# Patient Record
Sex: FEMALE | Race: WHITE | NOT HISPANIC OR LATINO | Employment: OTHER | URBAN - METROPOLITAN AREA
[De-identification: names, ages, dates, MRNs, and addresses within clinical notes are randomized per-mention and may not be internally consistent; named-entity substitution may affect disease eponyms.]

---

## 2017-10-02 PROBLEM — N85.8 UTERINE MASS: Status: ACTIVE | Noted: 2017-10-02

## 2018-12-11 PROBLEM — G40.909 SEIZURE DISORDER: Status: ACTIVE | Noted: 2018-12-11

## 2018-12-11 PROBLEM — I10 HTN (HYPERTENSION): Status: ACTIVE | Noted: 2018-12-11

## 2018-12-11 PROBLEM — S72.145A CLOSED NONDISPLACED INTERTROCHANTERIC FRACTURE OF LEFT FEMUR: Status: ACTIVE | Noted: 2018-12-11

## 2018-12-11 PROBLEM — S91.302A OPEN WOUND OF LEFT HEEL: Status: ACTIVE | Noted: 2018-12-11

## 2018-12-11 PROBLEM — G62.9 PERIPHERAL NEUROPATHY: Status: ACTIVE | Noted: 2018-12-11

## 2018-12-20 ENCOUNTER — TELEPHONE (OUTPATIENT)
Dept: ORTHOPEDICS | Facility: CLINIC | Age: 73
End: 2018-12-20

## 2018-12-20 NOTE — TELEPHONE ENCOUNTER
----- Message from Andria Todd sent at 12/20/2018 12:39 PM CST -----  Type:  Sooner Apoointment Request    Caller is requesting a sooner appointment.  Caller declined first available appointment listed below.  Caller will not accept being placed on the waitlist and is requesting a message be sent to doctor.    Name of Caller:  Patient  When is the first available appointment?  12/31/18  Symptoms:  2 week post op  Best Call Back Number:  [phone] (home)     Additional Information:  Advised to schedule an appt for 12/28/18, advised two weeks from previous surgery on 12/14/18

## 2018-12-20 NOTE — TELEPHONE ENCOUNTER
Called patient three times and left voicemail for patient to call back to come in and see Dr. De La Cruz. Will get patient in as soon as possible when they call back.

## 2018-12-27 ENCOUNTER — TELEPHONE (OUTPATIENT)
Dept: ORTHOPEDICS | Facility: CLINIC | Age: 73
End: 2018-12-27

## 2018-12-27 NOTE — TELEPHONE ENCOUNTER
Informed Roger with [facility] patient is partial weightbearing. Understanding verbalized      ----- Message from Jorgito Johnson sent at 12/27/2018 10:12 AM CST -----  Contact: Roger  Type: Needs Medical Advice    Who Called:  Roger with [facility]  Symptoms (please be specific):    How long has patient had these symptoms:    Pharmacy name and phone #:    Best Call Back Number: [phone]  Additional Information: requesting weight bearing status,requesting a call back

## 2018-12-28 ENCOUNTER — OFFICE VISIT (OUTPATIENT)
Dept: ORTHOPEDICS | Facility: CLINIC | Age: 73
End: 2018-12-28
Payer: MEDICARE

## 2018-12-28 VITALS — WEIGHT: 148 LBS | HEIGHT: 65 IN | BODY MASS INDEX: 24.66 KG/M2

## 2018-12-28 DIAGNOSIS — Z87.81 S/P ORIF (OPEN REDUCTION INTERNAL FIXATION) FRACTURE: Primary | ICD-10-CM

## 2018-12-28 DIAGNOSIS — Z98.890 S/P ORIF (OPEN REDUCTION INTERNAL FIXATION) FRACTURE: Primary | ICD-10-CM

## 2018-12-28 DIAGNOSIS — G89.18 POST-OP PAIN: ICD-10-CM

## 2018-12-28 PROCEDURE — 99999 PR PBB SHADOW E&M-EST. PATIENT-LVL III: CPT | Mod: PBBFAC,,, | Performed by: ORTHOPAEDIC SURGERY

## 2018-12-28 PROCEDURE — 99213 OFFICE O/P EST LOW 20 MIN: CPT | Mod: PBBFAC,PN | Performed by: ORTHOPAEDIC SURGERY

## 2018-12-28 PROCEDURE — 99024 POSTOP FOLLOW-UP VISIT: CPT | Mod: POP,,, | Performed by: ORTHOPAEDIC SURGERY

## 2018-12-28 RX ORDER — OXYCODONE AND ACETAMINOPHEN 10; 325 MG/1; MG/1
1 TABLET ORAL EVERY 6 HOURS PRN
Qty: 27 TABLET | Refills: 0 | Status: CANCELLED | OUTPATIENT
Start: 2018-12-28

## 2018-12-28 NOTE — PROGRESS NOTES
HISTORY OF PRESENT ILLNESS:  Two weeks out from TFN for a left intertrochanteric   femur fracture, doing well.  Wounds look good.  At this point, we will continue   with weightbearing to tolerance, although she cannot tolerate much   weightbearing as it is very painful for her.  Again, she takes opioid pain   medication on a chronic basis.  She is requesting a Percocet prescription.  We   will give this to her on one-time basis.  We can check her back here in a   month's time as a postoperative visit with x-rays of her left hip.      MARCIA/DAGMAR  dd: 12/28/2018 12:13:04 (CST)  td: 12/29/2018 00:45:27 (CST)  Doc ID   #[id number]  Job ID #[id number]    CC:

## 2018-12-31 ENCOUNTER — TELEPHONE (OUTPATIENT)
Dept: ORTHOPEDICS | Facility: CLINIC | Age: 73
End: 2018-12-31

## 2018-12-31 NOTE — TELEPHONE ENCOUNTER
----- Message from Fanny Franklin sent at 12/31/2018  3:17 PM CST -----  Type: Needs Medical Advice    Who Called:  Sabrina with [facility]  Best Call Back Number: [phone]  Additional Information: Stated patient fell on Saturday/scraped right arm/needs to know patient's weight bearing status/please call back to advise.

## 2018-12-31 NOTE — TELEPHONE ENCOUNTER
Spoke to Sabrina with [facility] home health. Advised patient is weight bearing as tolerated per Dr. De La Cruz's note from 12/28/18. Sabrina reports patient slid out of bed to floor but only scraped her arm.

## 2019-01-29 DIAGNOSIS — Z98.890 S/P ORIF (OPEN REDUCTION INTERNAL FIXATION) FRACTURE: Primary | ICD-10-CM

## 2019-01-29 DIAGNOSIS — Z87.81 S/P ORIF (OPEN REDUCTION INTERNAL FIXATION) FRACTURE: Primary | ICD-10-CM

## 2019-01-31 ENCOUNTER — HOSPITAL ENCOUNTER (OUTPATIENT)
Dept: RADIOLOGY | Facility: HOSPITAL | Age: 74
Discharge: HOME | End: 2019-01-31
Attending: ORTHOPAEDIC SURGERY
Payer: MEDICARE

## 2019-01-31 ENCOUNTER — OFFICE VISIT (OUTPATIENT)
Dept: ORTHOPEDICS | Facility: CLINIC | Age: 74
End: 2019-01-31
Payer: MEDICARE

## 2019-01-31 VITALS — BODY MASS INDEX: 24.66 KG/M2 | HEIGHT: 65 IN | WEIGHT: 148 LBS

## 2019-01-31 DIAGNOSIS — G89.18 POST-OP PAIN: ICD-10-CM

## 2019-01-31 DIAGNOSIS — Z87.81 S/P ORIF (OPEN REDUCTION INTERNAL FIXATION) FRACTURE: ICD-10-CM

## 2019-01-31 DIAGNOSIS — Z98.890 S/P ORIF (OPEN REDUCTION INTERNAL FIXATION) FRACTURE: Primary | ICD-10-CM

## 2019-01-31 DIAGNOSIS — Z87.81 S/P ORIF (OPEN REDUCTION INTERNAL FIXATION) FRACTURE: Primary | ICD-10-CM

## 2019-01-31 DIAGNOSIS — Z98.890 S/P ORIF (OPEN REDUCTION INTERNAL FIXATION) FRACTURE: ICD-10-CM

## 2019-01-31 PROCEDURE — 99999 PR PBB SHADOW E&M-EST. PATIENT-LVL III: CPT | Mod: PBBFAC,,, | Performed by: ORTHOPAEDIC SURGERY

## 2019-01-31 PROCEDURE — 99024 POSTOP FOLLOW-UP VISIT: CPT | Mod: POP,,, | Performed by: ORTHOPAEDIC SURGERY

## 2019-01-31 PROCEDURE — 99024 PR POST-OP FOLLOW-UP VISIT: ICD-10-PCS | Mod: POP,,, | Performed by: ORTHOPAEDIC SURGERY

## 2019-01-31 PROCEDURE — 73502 XR ORTHO PELVIS_HIP POST OP LEFT: ICD-10-PCS | Mod: 26,LT,, | Performed by: RADIOLOGY

## 2019-01-31 PROCEDURE — 99999 PR PBB SHADOW E&M-EST. PATIENT-LVL III: ICD-10-PCS | Mod: PBBFAC,,, | Performed by: ORTHOPAEDIC SURGERY

## 2019-01-31 PROCEDURE — 73502 X-RAY EXAM HIP UNI 2-3 VIEWS: CPT | Mod: 26,LT,, | Performed by: RADIOLOGY

## 2019-01-31 PROCEDURE — 99213 OFFICE O/P EST LOW 20 MIN: CPT | Mod: PBBFAC,25,PN | Performed by: ORTHOPAEDIC SURGERY

## 2019-01-31 PROCEDURE — 73502 X-RAY EXAM HIP UNI 2-3 VIEWS: CPT | Mod: TC,PO,LT

## 2019-01-31 NOTE — PROGRESS NOTES
Six weeks out from a trochanteric fixation nail, doing well.  Wounds look good.    No signs of infection.    X-rays look good.    PLAN:  Continue with activities to tolerance.  Follow up in six weeks' time as a   final postoperative visit.      MARCIA/DAGMAR  dd: 01/31/2019 16:12:49 (CST)  td: 02/01/2019 07:25:01 (CST)  Doc ID   #[id number]  Job ID #[id number]    CC:

## 2019-09-12 ENCOUNTER — HOSPITAL ENCOUNTER (EMERGENCY)
Facility: HOSPITAL | Age: 74
Discharge: HOME | End: 2019-09-12
Attending: EMERGENCY MEDICINE
Payer: MEDICARE

## 2019-09-12 VITALS
RESPIRATION RATE: 15 BRPM | TEMPERATURE: 98 F | OXYGEN SATURATION: 97 % | HEART RATE: 99 BPM | HEIGHT: 66 IN | DIASTOLIC BLOOD PRESSURE: 66 MMHG | SYSTOLIC BLOOD PRESSURE: 153 MMHG | BODY MASS INDEX: 28.28 KG/M2 | WEIGHT: 176 LBS

## 2019-09-12 DIAGNOSIS — J40 BRONCHITIS: ICD-10-CM

## 2019-09-12 DIAGNOSIS — R11.2 NON-INTRACTABLE VOMITING WITH NAUSEA, UNSPECIFIED VOMITING TYPE: Primary | ICD-10-CM

## 2019-09-12 DIAGNOSIS — R10.9 ABDOMINAL DISCOMFORT: ICD-10-CM

## 2019-09-12 LAB
ALBUMIN SERPL BCP-MCNC: 4 G/DL (ref 3.5–5.2)
ALP SERPL-CCNC: 84 U/L (ref 55–135)
ALT SERPL W/O P-5'-P-CCNC: 14 U/L (ref 10–44)
ANION GAP SERPL CALC-SCNC: 10 MMOL/L (ref 8–16)
AST SERPL-CCNC: 19 U/L (ref 10–40)
BASOPHILS # BLD AUTO: 0.03 K/UL (ref 0–0.2)
BASOPHILS NFR BLD: 0.6 % (ref 0–1.9)
BILIRUB SERPL-MCNC: 1.1 MG/DL (ref 0.1–1)
BILIRUB UR QL STRIP: NEGATIVE
BUN SERPL-MCNC: 23 MG/DL (ref 8–23)
CALCIUM SERPL-MCNC: 9.5 MG/DL (ref 8.7–10.5)
CHLORIDE SERPL-SCNC: 103 MMOL/L (ref 95–110)
CLARITY UR: CLEAR
CO2 SERPL-SCNC: 26 MMOL/L (ref 23–29)
COLOR UR: YELLOW
CREAT SERPL-MCNC: 0.7 MG/DL (ref 0.5–1.4)
DIFFERENTIAL METHOD: ABNORMAL
EOSINOPHIL # BLD AUTO: 0.1 K/UL (ref 0–0.5)
EOSINOPHIL NFR BLD: 1.2 % (ref 0–8)
ERYTHROCYTE [DISTWIDTH] IN BLOOD BY AUTOMATED COUNT: 12.8 % (ref 11.5–14.5)
EST. GFR  (AFRICAN AMERICAN): >60 ML/MIN/1.73 M^2
EST. GFR  (NON AFRICAN AMERICAN): >60 ML/MIN/1.73 M^2
GLUCOSE SERPL-MCNC: 103 MG/DL (ref 70–110)
GLUCOSE UR QL STRIP: NEGATIVE
HCT VFR BLD AUTO: 37.7 % (ref 37–48.5)
HGB BLD-MCNC: 12.2 G/DL (ref 12–16)
HGB UR QL STRIP: ABNORMAL
IMM GRANULOCYTES # BLD AUTO: 0.02 K/UL (ref 0–0.04)
IMM GRANULOCYTES NFR BLD AUTO: 0.4 % (ref 0–0.5)
KETONES UR QL STRIP: ABNORMAL
LEUKOCYTE ESTERASE UR QL STRIP: NEGATIVE
LIPASE SERPL-CCNC: 20 U/L (ref 4–60)
LYMPHOCYTES # BLD AUTO: 0.9 K/UL (ref 1–4.8)
LYMPHOCYTES NFR BLD: 18.8 % (ref 18–48)
MCH RBC QN AUTO: 29.8 PG (ref 27–31)
MCHC RBC AUTO-ENTMCNC: 32.4 G/DL (ref 32–36)
MCV RBC AUTO: 92 FL (ref 82–98)
MONOCYTES # BLD AUTO: 0.3 K/UL (ref 0.3–1)
MONOCYTES NFR BLD: 5.7 % (ref 4–15)
NEUTROPHILS # BLD AUTO: 3.6 K/UL (ref 1.8–7.7)
NEUTROPHILS NFR BLD: 73.3 % (ref 38–73)
NITRITE UR QL STRIP: NEGATIVE
NRBC BLD-RTO: 0 /100 WBC
PH UR STRIP: 6 [PH] (ref 5–8)
PLATELET # BLD AUTO: 215 K/UL (ref 150–350)
PMV BLD AUTO: 9.4 FL (ref 9.2–12.9)
POTASSIUM SERPL-SCNC: 4.3 MMOL/L (ref 3.5–5.1)
PROT SERPL-MCNC: 7.6 G/DL (ref 6–8.4)
PROT UR QL STRIP: ABNORMAL
RBC # BLD AUTO: 4.09 M/UL (ref 4–5.4)
SODIUM SERPL-SCNC: 139 MMOL/L (ref 136–145)
SP GR UR STRIP: 1.01 (ref 1–1.03)
TROPONIN I SERPL DL<=0.01 NG/ML-MCNC: <0.03 NG/ML (ref 0.02–0.04)
URN SPEC COLLECT METH UR: ABNORMAL
UROBILINOGEN UR STRIP-ACNC: NEGATIVE EU/DL
WBC # BLD AUTO: 4.9 K/UL (ref 3.9–12.7)

## 2019-09-12 PROCEDURE — 81003 URINALYSIS AUTO W/O SCOPE: CPT

## 2019-09-12 PROCEDURE — 63600175 PHARM REV CODE 636 W HCPCS: Performed by: EMERGENCY MEDICINE

## 2019-09-12 PROCEDURE — 99285 EMERGENCY DEPT VISIT HI MDM: CPT | Mod: 25

## 2019-09-12 PROCEDURE — 83690 ASSAY OF LIPASE: CPT

## 2019-09-12 PROCEDURE — 80053 COMPREHEN METABOLIC PANEL: CPT

## 2019-09-12 PROCEDURE — 51701 INSERT BLADDER CATHETER: CPT

## 2019-09-12 PROCEDURE — 25500020 PHARM REV CODE 255: Performed by: EMERGENCY MEDICINE

## 2019-09-12 PROCEDURE — 93005 ELECTROCARDIOGRAM TRACING: CPT

## 2019-09-12 PROCEDURE — 96374 THER/PROPH/DIAG INJ IV PUSH: CPT | Mod: XE

## 2019-09-12 PROCEDURE — 85025 COMPLETE CBC W/AUTO DIFF WBC: CPT

## 2019-09-12 PROCEDURE — 84484 ASSAY OF TROPONIN QUANT: CPT

## 2019-09-12 RX ORDER — ONDANSETRON 2 MG/ML
4 INJECTION INTRAMUSCULAR; INTRAVENOUS
Status: COMPLETED | OUTPATIENT
Start: 2019-09-12 | End: 2019-09-12

## 2019-09-12 RX ORDER — AZITHROMYCIN 250 MG/1
250 TABLET, FILM COATED ORAL DAILY
Qty: 6 TABLET | Refills: 0 | Status: SHIPPED | OUTPATIENT
Start: 2019-09-12 | End: 2020-01-07 | Stop reason: ALTCHOICE

## 2019-09-12 RX ORDER — FUROSEMIDE 40 MG/1
40 TABLET ORAL DAILY
Status: ON HOLD | COMMUNITY
End: 2020-01-11 | Stop reason: HOSPADM

## 2019-09-12 RX ORDER — OXYCODONE AND ACETAMINOPHEN 10; 325 MG/1; MG/1
1 TABLET ORAL 4 TIMES DAILY
COMMUNITY
End: 2020-08-22 | Stop reason: CLARIF

## 2019-09-12 RX ORDER — ONDANSETRON 4 MG/1
4 TABLET, ORALLY DISINTEGRATING ORAL ONCE
Qty: 10 TABLET | Refills: 0 | Status: SHIPPED | OUTPATIENT
Start: 2019-09-12 | End: 2019-09-12

## 2019-09-12 RX ORDER — CYCLOBENZAPRINE HCL 10 MG
10 TABLET ORAL 3 TIMES DAILY
COMMUNITY
End: 2022-05-18 | Stop reason: SDUPTHER

## 2019-09-12 RX ADMIN — ONDANSETRON 4 MG: 2 INJECTION INTRAMUSCULAR; INTRAVENOUS at 12:09

## 2019-09-12 RX ADMIN — IOHEXOL 100 ML: 350 INJECTION, SOLUTION INTRAVENOUS at 11:09

## 2019-09-12 NOTE — ED PROVIDER NOTES
Encounter Date: 9/12/2019       History     Chief Complaint   Patient presents with    Abdominal Pain     X 1 DAY    Nausea    Vomiting     Patient here with reported nausea vomiting cough productive of a yellow sputum upper abdominal pain she denies any fever chills  states patient has been sick for approximately 1 week the symptoms significantly worse this morning and she was pale he is concerned because she has a history of bleeding ulcer in the past does been no hematemesis cough is productive of yellow sputum and no hemoptysis there has. been no melena patient states that she has actually been constipated no diarrhea patient reports shortness of breath and chronic back pain        Review of patient's allergies indicates:  No Known Allergies  Past Medical History:   Diagnosis Date    Arthritis     Back pain     Bleeding ulcer 07/2016    Chronic pain     DDD (degenerative disc disease), cervical     DDD (degenerative disc disease), lumbar     Diverticulitis     Encounter for blood transfusion     Hypertension     Neuropathy of both feet     Seizures      Past Surgical History:   Procedure Laterality Date    BACK SURGERY      BREAST SURGERY Right     lumpectomy    CYSTOSCOPY N/A 10/2/2017    Performed by [facility]ales [facility] at [facility] OR    INSERTION, INTRAMEDULLARY SANTOS, FEMUR, TROCHANTER Left 12/11/2018    Performed by Eulalio De La Cruz MD at [facility] OR    INSERTION, SPINAL CORD STIMULATOR, MINIMALLY INVASIVE N/A 9/18/2013    Per[facility]el [facility] at [facility] OR    ROBOT ASSISTED LAPAROSCOPIC SALPINGO-OOPHERECTOMY Bilateral 10/2/2017    Performed by [facility] at [facility] OR    ROBOTIC ASSISTED LAPAROSCOPIC HYSTERECTOMY N/A 10/2/2017    Performed by Mely Morales MD at [facility] OR    SPINAL CORD STIMULATOR IMPLANT  09/18/2013    and removal    SPINE SURGERY  2006    lumbar L2-S1 decompression.    SPINE SURGERY      cervical decompression    TONSILLECTOMY       Family  History   Problem Relation Age of Onset    Diabetes Mother     Hypertension Mother     Diabetes Father         insulin dependent    Hypertension Father     Heart disease Father     Coronary artery disease Father     Diabetes Sister     Diabetes Brother     COPD Brother      Social History     Tobacco Use    Smoking status: Never Smoker    Smokeless tobacco: Never Used   Substance Use Topics    Alcohol use: No    Drug use: No     Review of Systems   Constitutional: Positive for appetite change and fatigue. Negative for chills and fever.   HENT: Negative.  Negative for congestion, ear pain and sinus pressure.    Eyes: Negative.    Respiratory: Positive for cough and shortness of breath. Negative for chest tightness.    Cardiovascular: Negative for chest pain, palpitations and leg swelling.   Gastrointestinal: Positive for abdominal pain, constipation, nausea and vomiting. Negative for blood in stool.   Endocrine: Negative.    Genitourinary: Negative for difficulty urinating, dysuria and hematuria.   Musculoskeletal: Negative.    Skin: Positive for pallor. Negative for rash.   Allergic/Immunologic: Negative for immunocompromised state.   Neurological: Negative for headaches.   Hematological: Negative for adenopathy.   Psychiatric/Behavioral: Negative.        Physical Exam     Initial Vitals [09/12/19 0956]   BP Pulse Resp Temp SpO2   (!) 157/80 99 18 97.7 °F (36.5 °C) 97 %      MAP       --         Physical Exam    Constitutional: She appears well-developed and well-nourished.   HENT:   Head: Normocephalic and atraumatic.   Right Ear: External ear normal.   Left Ear: External ear normal.   Mouth/Throat: Oropharynx is clear and moist.   Eyes: Conjunctivae and EOM are normal. Pupils are equal, round, and reactive to light. Right eye exhibits no discharge. Left eye exhibits no discharge.   Neck: Normal range of motion. Neck supple. No JVD present.   Cardiovascular: Normal rate, regular rhythm, normal heart  sounds and intact distal pulses.   Pulmonary/Chest: No respiratory distress. She has rhonchi.   Abdominal: Soft. Bowel sounds are normal.   Musculoskeletal: Normal range of motion. She exhibits no edema.   Neurological: She is alert and oriented to person, place, and time. She has normal strength. GCS score is 15. GCS eye subscore is 4. GCS verbal subscore is 5. GCS motor subscore is 6.   Skin: Skin is warm and dry. Capillary refill takes less than 2 seconds. There is pallor.   Psychiatric: She has a normal mood and affect. Her behavior is normal.         ED Course   Procedures  Labs Reviewed   CBC W/ AUTO DIFFERENTIAL - Abnormal; Notable for the following components:       Result Value    Lymph # 0.9 (*)     Gran% 73.3 (*)     All other components within normal limits   COMPREHENSIVE METABOLIC PANEL   LIPASE   URINALYSIS, REFLEX TO URINE CULTURE   TROPONIN I        ECG Results          EKG 12-lead (In process)  Result time 09/12/19 10:36:23    In process by Interface, Lab In [facility] (09/12/19 10:36:23)                 Narrative:    Test Reason : R10.9,    Vent. Rate : 098 BPM     Atrial Rate : 098 BPM     P-R Int : 142 ms          QRS Dur : 084 ms      QT Int : 354 ms       P-R-T Axes : 060 031 056 degrees     QTc Int : 451 ms    Normal sinus rhythm  Normal ECG  No previous ECGs available    Referred By: [email or web address]   SELF           Confirmed By:                             Imaging Results          X-Ray Chest AP Portable (In process)                                    ED Course as of Sep 12 1625   Thu Sep 12, 2019   1143 Lipase: 20 [AT]   1144 WBC: 4.90 [AT]   1144 Troponin I: <0.030 [AT]   1144 Hemoglobin: 12.2 [AT]   1144 Hematocrit: 37.7 [AT]   1144 Platelets: 215 [AT]   1144 Sodium: 139 [AT]   1144 Potassium: 4.3 [AT]   1144 Chloride: 103 [AT]   1144 CO2: 26 [AT]   1144 Glucose: 103 [AT]   1144 BUN, Bld: 23 [AT]   1144 Creatinine: 0.7 [AT]   1144 BILIRUBIN TOTAL(!): 1.1 [AT]   1144 Anion Gap: 10 [AT]   1144 ALT:  14 [AT]   1144 AST: 19 [AT]   1144 Alkaline Phosphatase: 84 [AT]      ED Course User Index  [AT] Bimal Fitch MD     Clinical Impression:       ICD-10-CM ICD-9-CM   1. Non-intractable vomiting with nausea, unspecified vomiting type R11.2 787.01   2. Abdominal discomfort R10.9 789.00   3. Bronchitis J40 490                                Bimal Fitch MD  09/12/19 3830

## 2019-10-04 ENCOUNTER — HOSPITAL ENCOUNTER (EMERGENCY)
Facility: HOSPITAL | Age: 74
Discharge: HOME | End: 2019-10-04
Attending: EMERGENCY MEDICINE
Payer: MEDICARE

## 2019-10-04 VITALS
DIASTOLIC BLOOD PRESSURE: 85 MMHG | HEART RATE: 81 BPM | SYSTOLIC BLOOD PRESSURE: 138 MMHG | HEIGHT: 66 IN | TEMPERATURE: 98 F | BODY MASS INDEX: 27.32 KG/M2 | RESPIRATION RATE: 16 BRPM | WEIGHT: 170 LBS | OXYGEN SATURATION: 99 %

## 2019-10-04 DIAGNOSIS — S13.9XXA NECK SPRAIN, INITIAL ENCOUNTER: Primary | ICD-10-CM

## 2019-10-04 DIAGNOSIS — S09.90XA INJURY OF HEAD, INITIAL ENCOUNTER: ICD-10-CM

## 2019-10-04 PROCEDURE — 25000003 PHARM REV CODE 250: Performed by: EMERGENCY MEDICINE

## 2019-10-04 PROCEDURE — 99284 EMERGENCY DEPT VISIT MOD MDM: CPT | Mod: 25

## 2019-10-04 RX ORDER — OXYCODONE AND ACETAMINOPHEN 5; 325 MG/1; MG/1
2 TABLET ORAL
Status: COMPLETED | OUTPATIENT
Start: 2019-10-04 | End: 2019-10-04

## 2019-10-04 RX ORDER — METHOCARBAMOL 500 MG/1
500 TABLET, FILM COATED ORAL
Status: COMPLETED | OUTPATIENT
Start: 2019-10-04 | End: 2019-10-04

## 2019-10-04 RX ORDER — METHOCARBAMOL 500 MG/1
1000 TABLET, FILM COATED ORAL 3 TIMES DAILY
Qty: 30 TABLET | Refills: 0 | Status: SHIPPED | OUTPATIENT
Start: 2019-10-04 | End: 2019-10-09

## 2019-10-04 RX ADMIN — METHOCARBAMOL 500 MG: 500 TABLET, FILM COATED ORAL at 01:10

## 2019-10-04 RX ADMIN — OXYCODONE AND ACETAMINOPHEN 2 TABLET: 5; 325 TABLET ORAL at 01:10
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
## 2020-01-07 ENCOUNTER — HOSPITAL ENCOUNTER (EMERGENCY)
Facility: HOSPITAL | Age: 75
Discharge: HOME | End: 2020-01-07
Attending: EMERGENCY MEDICINE
Payer: MEDICARE

## 2020-01-07 VITALS
SYSTOLIC BLOOD PRESSURE: 161 MMHG | OXYGEN SATURATION: 100 % | WEIGHT: 178 LBS | DIASTOLIC BLOOD PRESSURE: 73 MMHG | RESPIRATION RATE: 18 BRPM | BODY MASS INDEX: 28.61 KG/M2 | TEMPERATURE: 98 F | HEART RATE: 102 BPM | HEIGHT: 66 IN

## 2020-01-07 DIAGNOSIS — J40 BRONCHITIS: Primary | ICD-10-CM

## 2020-01-07 DIAGNOSIS — R05.9 COUGH: ICD-10-CM

## 2020-01-07 DIAGNOSIS — D64.9 ANEMIA, UNSPECIFIED TYPE: ICD-10-CM

## 2020-01-07 LAB
ALBUMIN SERPL BCP-MCNC: 3.8 G/DL (ref 3.5–5.2)
ALP SERPL-CCNC: 76 U/L (ref 55–135)
ALT SERPL W/O P-5'-P-CCNC: 15 U/L (ref 10–44)
ANION GAP SERPL CALC-SCNC: 15 MMOL/L (ref 8–16)
AST SERPL-CCNC: 18 U/L (ref 10–40)
BASOPHILS # BLD AUTO: 0.03 K/UL (ref 0–0.2)
BASOPHILS NFR BLD: 0.5 % (ref 0–1.9)
BILIRUB SERPL-MCNC: 1.1 MG/DL (ref 0.1–1)
BILIRUB UR QL STRIP: NEGATIVE
BUN SERPL-MCNC: 17 MG/DL (ref 8–23)
CALCIUM SERPL-MCNC: 9.5 MG/DL (ref 8.7–10.5)
CHLORIDE SERPL-SCNC: 103 MMOL/L (ref 95–110)
CLARITY UR: CLEAR
CO2 SERPL-SCNC: 20 MMOL/L (ref 23–29)
COLOR UR: YELLOW
CREAT SERPL-MCNC: 0.5 MG/DL (ref 0.5–1.4)
DEPRECATED S PYO AG THROAT QL EIA: NEGATIVE
DIFFERENTIAL METHOD: ABNORMAL
EOSINOPHIL # BLD AUTO: 0 K/UL (ref 0–0.5)
EOSINOPHIL NFR BLD: 0.3 % (ref 0–8)
ERYTHROCYTE [DISTWIDTH] IN BLOOD BY AUTOMATED COUNT: 12.8 % (ref 11.5–14.5)
EST. GFR  (AFRICAN AMERICAN): >60 ML/MIN/1.73 M^2
EST. GFR  (NON AFRICAN AMERICAN): >60 ML/MIN/1.73 M^2
GLUCOSE SERPL-MCNC: 105 MG/DL (ref 70–110)
GLUCOSE UR QL STRIP: NEGATIVE
HCT VFR BLD AUTO: 33.3 % (ref 37–48.5)
HGB BLD-MCNC: 10.7 G/DL (ref 12–16)
HGB UR QL STRIP: ABNORMAL
IMM GRANULOCYTES # BLD AUTO: 0.03 K/UL (ref 0–0.04)
IMM GRANULOCYTES NFR BLD AUTO: 0.5 % (ref 0–0.5)
INFLUENZA A, MOLECULAR: NEGATIVE
INFLUENZA B, MOLECULAR: NEGATIVE
KETONES UR QL STRIP: ABNORMAL
LEUKOCYTE ESTERASE UR QL STRIP: NEGATIVE
LYMPHOCYTES # BLD AUTO: 0.8 K/UL (ref 1–4.8)
LYMPHOCYTES NFR BLD: 12.5 % (ref 18–48)
MCH RBC QN AUTO: 28.9 PG (ref 27–31)
MCHC RBC AUTO-ENTMCNC: 32.1 G/DL (ref 32–36)
MCV RBC AUTO: 90 FL (ref 82–98)
MONOCYTES # BLD AUTO: 0.2 K/UL (ref 0.3–1)
MONOCYTES NFR BLD: 3.1 % (ref 4–15)
NEUTROPHILS # BLD AUTO: 5.1 K/UL (ref 1.8–7.7)
NEUTROPHILS NFR BLD: 83.1 % (ref 38–73)
NITRITE UR QL STRIP: NEGATIVE
NRBC BLD-RTO: 0 /100 WBC
PH UR STRIP: 6 [PH] (ref 5–8)
PLATELET # BLD AUTO: 274 K/UL (ref 150–350)
PMV BLD AUTO: 9.4 FL (ref 9.2–12.9)
POTASSIUM SERPL-SCNC: 3.8 MMOL/L (ref 3.5–5.1)
PROT SERPL-MCNC: 6.8 G/DL (ref 6–8.4)
PROT UR QL STRIP: ABNORMAL
RBC # BLD AUTO: 3.7 M/UL (ref 4–5.4)
SODIUM SERPL-SCNC: 138 MMOL/L (ref 136–145)
SP GR UR STRIP: 1.02 (ref 1–1.03)
SPECIMEN SOURCE: NORMAL
URN SPEC COLLECT METH UR: ABNORMAL
UROBILINOGEN UR STRIP-ACNC: NEGATIVE EU/DL
WBC # BLD AUTO: 6.17 K/UL (ref 3.9–12.7)

## 2020-01-07 PROCEDURE — 87880 STREP A ASSAY W/OPTIC: CPT

## 2020-01-07 PROCEDURE — 87502 INFLUENZA DNA AMP PROBE: CPT

## 2020-01-07 PROCEDURE — 85025 COMPLETE CBC W/AUTO DIFF WBC: CPT

## 2020-01-07 PROCEDURE — 81003 URINALYSIS AUTO W/O SCOPE: CPT

## 2020-01-07 PROCEDURE — 80053 COMPREHEN METABOLIC PANEL: CPT

## 2020-01-07 PROCEDURE — 87081 CULTURE SCREEN ONLY: CPT

## 2020-01-07 PROCEDURE — 99284 EMERGENCY DEPT VISIT MOD MDM: CPT | Mod: 25

## 2020-01-07 RX ORDER — LEVOFLOXACIN 500 MG/1
500 TABLET, FILM COATED ORAL DAILY
Qty: 7 TABLET | Refills: 0 | Status: SHIPPED | OUTPATIENT
Start: 2020-01-07 | End: 2020-01-08 | Stop reason: CLARIF

## 2020-01-07 NOTE — DISCHARGE INSTRUCTIONS
Follow-up with your primary care doctor for review of your laboratory tests to include year complete blood count.  You have a mild anemia and recommend repeat lab testing to check for stability

## 2020-01-07 NOTE — ED PROVIDER NOTES
Encounter Date: 1/7/2020       History     Chief Complaint   Patient presents with    Cough    Sore Throat     X 5 DAYS    Chills     74-year-old female who presents to the emergency department for evaluations of complaints of cough.  Patient and her family member present are both terrible historians.  To the best of my ability, I can some eyes that the patient has been coughing for a few days, productive of green sputum.  No reported or documented fevers however the patient does endorse chills as described as being cold by the patient and her family.    She denies any significant past medical history.  She denies hypertension, cardiovascular disease, diabetes.    Her primary care provider is Dr. Rangel and she is not current on her pneumococcal, meningeal or influenza vaccinations.        Review of patient's allergies indicates:  No Known Allergies  Past Medical History:   Diagnosis Date    Arthritis     Back pain     Bleeding ulcer 07/2016    Chronic pain     DDD (degenerative disc disease), cervical     DDD (degenerative disc disease), lumbar     Diverticulitis     Encounter for blood transfusion     Hypertension     Neuropathy of both feet     Seizures      Past Surgical History:   Procedure Laterality Date    BACK SURGERY      BREAST SURGERY Right     lumpectomy    INTRAMEDULLARY RODDING OF TROCHANTER OF FEMUR Left 12/11/2018    Procedure: INSERTION, INTRAMEDULLARY SANTOS, FEMUR, TROCHANTER;  Surgeon: Eulalio De La Cruz MD;  Location: [facility];  Service: Orthopedics;  Laterality: Left;    SPINAL CORD STIMULATOR IMPLANT  09/18/2013    and removal    SPINE SURGERY  2006    lumbar L2-S1 decompression.    SPINE SURGERY      cervical decompression    TONSILLECTOMY       Family History   Problem Relation Age of Onset    Diabetes Mother     Hypertension Mother     Diabetes Father         insulin dependent    Hypertension Father     Heart disease Father     Coronary artery disease Father      Diabetes Sister     Diabetes Brother     COPD Brother      Social History     Tobacco Use    Smoking status: Never Smoker    Smokeless tobacco: Never Used   Substance Use Topics    Alcohol use: No    Drug use: No     Review of Systems   Constitutional: Positive for chills. Negative for appetite change and fever.   HENT: Positive for sore throat. Negative for congestion and rhinorrhea.    Eyes: Negative for discharge and redness.   Respiratory: Positive for cough. Negative for shortness of breath.    Cardiovascular: Negative for chest pain.   Gastrointestinal: Negative for abdominal pain.   Genitourinary: Negative for difficulty urinating and dysuria.   Musculoskeletal: Negative for arthralgias, back pain and joint swelling.   Skin: Negative for rash and wound.   Neurological: Negative for weakness.   Psychiatric/Behavioral: The patient is not nervous/anxious.    All other systems reviewed and are negative.      Physical Exam     Initial Vitals [01/07/20 1008]   BP Pulse Resp Temp SpO2   (!) 188/88 94 18 98.1 °F (36.7 °C) 97 %      MAP       --         Physical Exam    Nursing note and vitals reviewed.  Constitutional: She appears well-developed and well-nourished.   HENT:   Head: Normocephalic and atraumatic.   Right Ear: Tympanic membrane is not erythematous. A middle ear effusion is present.   Left Ear: Tympanic membrane normal.   Nose: Nose normal.   Mouth/Throat: Oropharynx is clear and moist. No oropharyngeal exudate.   Eyes: EOM are normal. Pupils are equal, round, and reactive to light.   Neck: Normal range of motion.   Pulmonary/Chest: No respiratory distress. She exhibits no deformity.   Musculoskeletal: Normal range of motion.   Neurological: She is alert and oriented to person, place, and time.   Skin: Skin is warm and dry.   Psychiatric: She has a normal mood and affect. Her behavior is normal.         ED Course   Procedures  Labs Reviewed   CBC W/ AUTO DIFFERENTIAL - Abnormal; Notable for the  following components:       Result Value    RBC 3.70 (*)     Hemoglobin 10.7 (*)     Hematocrit 33.3 (*)     Lymph # 0.8 (*)     Mono # 0.2 (*)     Gran% 83.1 (*)     Lymph% 12.5 (*)     Mono% 3.1 (*)     All other components within normal limits   COMPREHENSIVE METABOLIC PANEL - Abnormal; Notable for the following components:    CO2 20 (*)     All other components within normal limits   URINALYSIS, REFLEX TO URINE CULTURE - Abnormal; Notable for the following components:    Protein, UA Trace (*)     Ketones, UA 3+ (*)     Occult Blood UA Trace (*)     All other components within normal limits    Narrative:     Specimen Source->Urine   THROAT SCREEN, RAPID   CULTURE, STREP A,  THROAT   INFLUENZA A AND B ANTIGEN    Narrative:     Specimen Source->Nasopharyngeal Swab          Imaging Results          X-Ray Chest PA And Lateral (Final result)  Result time 01/07/20 10:48:43    Final result by Ronan Neely MD (01/07/20 10:48:43)                 Impression:      No acute cardiopulmonary process.      Electronically signed by: Ronan Neely MD  Date:    01/07/2020  Time:    10:48             Narrative:    EXAMINATION:  XR CHEST PA AND LATERAL    CLINICAL HISTORY:  Cough    FINDINGS:  PA and lateral chest with comparison dated 09/12/2019.  Cardiomediastinal silhouette is within normal limits. A calcified granuloma is noted at the left lung base.  The lungs are otherwise clear.  Pulmonary vasculature is normal. No acute osseous abnormality.                                 Medical Decision Making:   History:   Old Medical Records: I decided to obtain old medical records.  Old Records Summarized: records from clinic visits.  Initial Assessment:   NAD  Differential Diagnosis:   The patient's differential diagnoses includes but is not limited to upper respiratory infection, viral illness, influenza, pneumonia  Clinical Tests:   Lab Tests: Ordered  Radiological Study: Ordered and Reviewed  ED Management:  74-year-old female  who presents to the emergency department for evaluation of complaints of cough, sore throat chills. Has a unremarkable workup.  Chest x-ray did not show any evidence of a infiltrate or consolidative process to suggest underlying pneumonia.  No leukocytosis with a normal WBC.  Of note, the patient did have a mild anemia with a H&H of 10 and 33.  A review of the patient's medical record, she had anemia last year after a hip fracture however this was felt to be secondary to surgical blood loss.  The patient does not appear to be symptomatic from her anemia today.  Will recommend she follow up with her primary care provider for repeat testing.  Rapid testing for influenza and strep were negative, urinalysis is also negative for infection.                       ED Course as of Jan 07 1157   Tue Jan 07, 2020   1051 Influenza A, Molecular: Negative [BF]   1051 Influenza B, Molecular: Negative [BF]   1051 No acute cardiopulmonary process.     X-Ray Chest PA And Lateral [BF]   1151 WBC: 6.17 [BF]   1152 Hemoglobin(!): 10.7 [BF]   1152 Hematocrit(!): 33.3 [BF]   1152 Platelets: 274 [BF]   1152 NITRITE UA: Negative [BF]   1152 Leukocytes, UA: Negative [BF]   1152 RAPID STREP A SCREEN: Negative [BF]   1156 Sodium: 138 [BF]   1156 Potassium: 3.8 [BF]   1156 Chloride: 103 [BF]   1156 CO2(!): 20 [BF]   1156 Glucose: 105 [BF]      ED Course User Index  [BF] ADIS Rai                Clinical Impression:       ICD-10-CM ICD-9-CM   1. Bronchitis J40 490   2. Cough R05 786.2   3. Anemia, unspecified type D64.9 285.9                             ADIS Rai  01/07/20 1205

## 2020-01-07 NOTE — ED NOTES
C/O cough and cold with chills.  Calm and cooperative.  Alert and ambualtory.  Family with pt. No RD. Airway patent.

## 2020-01-08 PROBLEM — Z71.89 ACP (ADVANCE CARE PLANNING): Status: ACTIVE | Noted: 2020-01-08

## 2020-01-08 PROBLEM — K52.9 COLITIS: Status: ACTIVE | Noted: 2020-01-08

## 2020-01-09 LAB — BACTERIA THROAT CULT: NORMAL

## 2020-01-10 PROBLEM — K59.03 DRUG-INDUCED CONSTIPATION: Status: ACTIVE | Noted: 2020-01-10

## 2020-03-02 ENCOUNTER — HOSPITAL ENCOUNTER (EMERGENCY)
Facility: HOSPITAL | Age: 75
Discharge: LEFT BEFORE DISCHARGE | End: 2020-03-02
Payer: MEDICARE

## 2020-03-02 VITALS
WEIGHT: 170 LBS | RESPIRATION RATE: 21 BRPM | DIASTOLIC BLOOD PRESSURE: 96 MMHG | OXYGEN SATURATION: 99 % | BODY MASS INDEX: 27.32 KG/M2 | HEART RATE: 107 BPM | HEIGHT: 66 IN | TEMPERATURE: 99 F | SYSTOLIC BLOOD PRESSURE: 170 MMHG

## 2020-03-02 DIAGNOSIS — R53.1 WEAKNESS: ICD-10-CM

## 2020-03-02 PROCEDURE — 99283 EMERGENCY DEPT VISIT LOW MDM: CPT

## 2020-07-17 ENCOUNTER — HOSPITAL ENCOUNTER (OUTPATIENT)
Dept: PREADMISSION TESTING | Facility: HOSPITAL | Age: 75
Discharge: HOME | End: 2020-07-17
Attending: INTERNAL MEDICINE
Payer: MEDICARE

## 2020-07-17 VITALS
BODY MASS INDEX: 28.93 KG/M2 | WEIGHT: 180 LBS | SYSTOLIC BLOOD PRESSURE: 97 MMHG | HEIGHT: 66 IN | HEART RATE: 74 BPM | RESPIRATION RATE: 18 BRPM | TEMPERATURE: 98 F | DIASTOLIC BLOOD PRESSURE: 66 MMHG | OXYGEN SATURATION: 97 %

## 2020-07-17 DIAGNOSIS — Z01.818 PRE-OP TESTING: ICD-10-CM

## 2020-07-17 LAB
ANION GAP SERPL CALC-SCNC: 8 MMOL/L (ref 8–16)
BASOPHILS # BLD AUTO: 0.02 K/UL (ref 0–0.2)
BASOPHILS NFR BLD: 0.5 % (ref 0–1.9)
BUN SERPL-MCNC: 20 MG/DL (ref 8–23)
CALCIUM SERPL-MCNC: 9.3 MG/DL (ref 8.7–10.5)
CHLORIDE SERPL-SCNC: 106 MMOL/L (ref 95–110)
CO2 SERPL-SCNC: 27 MMOL/L (ref 23–29)
CREAT SERPL-MCNC: 0.9 MG/DL (ref 0.5–1.4)
DIFFERENTIAL METHOD: ABNORMAL
EOSINOPHIL # BLD AUTO: 0.1 K/UL (ref 0–0.5)
EOSINOPHIL NFR BLD: 2.9 % (ref 0–8)
ERYTHROCYTE [DISTWIDTH] IN BLOOD BY AUTOMATED COUNT: 14.2 % (ref 11.5–14.5)
EST. GFR  (AFRICAN AMERICAN): >60 ML/MIN/1.73 M^2
EST. GFR  (NON AFRICAN AMERICAN): >60 ML/MIN/1.73 M^2
GLUCOSE SERPL-MCNC: 100 MG/DL (ref 70–110)
HCT VFR BLD AUTO: 32.4 % (ref 37–48.5)
HGB BLD-MCNC: 9.6 G/DL (ref 12–16)
IMM GRANULOCYTES # BLD AUTO: 0.01 K/UL (ref 0–0.04)
IMM GRANULOCYTES NFR BLD AUTO: 0.3 % (ref 0–0.5)
LYMPHOCYTES # BLD AUTO: 1.3 K/UL (ref 1–4.8)
LYMPHOCYTES NFR BLD: 32.5 % (ref 18–48)
MCH RBC QN AUTO: 26.7 PG (ref 27–31)
MCHC RBC AUTO-ENTMCNC: 29.6 G/DL (ref 32–36)
MCV RBC AUTO: 90 FL (ref 82–98)
MONOCYTES # BLD AUTO: 0.3 K/UL (ref 0.3–1)
MONOCYTES NFR BLD: 8.8 % (ref 4–15)
NEUTROPHILS # BLD AUTO: 2.1 K/UL (ref 1.8–7.7)
NEUTROPHILS NFR BLD: 55 % (ref 38–73)
NRBC BLD-RTO: 0 /100 WBC
PLATELET # BLD AUTO: 325 K/UL (ref 150–350)
PMV BLD AUTO: 9.7 FL (ref 9.2–12.9)
POTASSIUM SERPL-SCNC: 4.6 MMOL/L (ref 3.5–5.1)
RBC # BLD AUTO: 3.59 M/UL (ref 4–5.4)
SODIUM SERPL-SCNC: 141 MMOL/L (ref 136–145)
WBC # BLD AUTO: 3.85 K/UL (ref 3.9–12.7)

## 2020-07-17 PROCEDURE — 85025 COMPLETE CBC W/AUTO DIFF WBC: CPT

## 2020-07-17 PROCEDURE — 36415 COLL VENOUS BLD VENIPUNCTURE: CPT

## 2020-07-17 PROCEDURE — 80048 BASIC METABOLIC PNL TOTAL CA: CPT

## 2020-07-17 RX ORDER — ESCITALOPRAM OXALATE 20 MG/1
20 TABLET ORAL DAILY
COMMUNITY
End: 2021-03-26 | Stop reason: SDUPTHER

## 2020-07-17 RX ORDER — FUROSEMIDE 40 MG/1
40 TABLET ORAL DAILY PRN
COMMUNITY
End: 2022-06-30 | Stop reason: SDUPTHER

## 2020-07-17 NOTE — PLAN OF CARE
Review of Systems   HENT: Positive for hearing loss.    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   Endo/Heme/Allergies: Bruises/bleeds easily.

## 2020-07-17 NOTE — DISCHARGE INSTRUCTIONS
To confirm, Your doctor has instructed you that procedure is scheduled for: July 21  COVID test at [address] [facility] @ 2:40pm   Endoscopy nurse will call the afternoon prior to surgery between 4:00 and 6:00 PM with the final arrival time.      Enter at [address] and come through front entrance.  You will be screened/ have temperature checked.    You may have 1 visitor who will also be screened.     Check in at registration.   After registration, proceed past gift shop and through glass door ( [facility] [place]) Check in at the nurses station to the left.   Do not eat or drink anything after midnight the night before your surgery - THIS INCLUDES  WATER, GUM, MINTS AND CANDY.  YOU MAY BRUSH YOUR TEETH BUT DO NOT SWALLOW     TAKE ONLY THESE MEDICATIONS WITH A SMALL SIP OF WATER THE MORNING OF YOUR PROCEDURE:  NONE      ONLY if you are diabetic, check your sugar in the morning before your procedure.       Do not take any diabetic medicines or insulin the morning of surgery .     PLEASE NOTE:  The surgery schedule has many variables which may affect the time of your surgery case.  Family members should be available if your surgery time changes.  Plan to be here the day of your procedure between 4-6 hours.      DO NOT TAKE THESE MEDICATIONS 5-7 DAYS PRIOR to your procedure or per your surgeon's request: ASPIRIN, ALEVE, ADVIL, IBUPROFEN,  MIRNA SELTZER, BC , FISH OIL , VITAMIN E, HERBALS  (May take Tylenol)      ONLY if you are prescribed any types of blood thinners such as:  Aspirin, Coumadin, Plavix, Pradaxa, Xarelto, Aggrenox, Effient, Eliquis, Savasya, Brilinta, or any other, ask your surgeon whether you should stop taking them and how long before surgery you should stop.  You may also need to verify with the prescribing physician if it is ok to stop your medication.                                                        IMPORTANT INSTRUCTIONS      · Do not smoke, vape or drink alcoholic beverages 24 hours  prior to your procedure.  · Shower the night before  and sleep in a bed with clean sheets.  Do not sleep with a pet in the bed.     · If you wear contact lenses, dentures, hearing aids or glasses, bring a container to put them in during surgery and give to a family member for safe keeping.    · Please leave all jewelry, piercing's and valuables at home.   · Wear rubber soled shoes (no flip flops).  · ONLY for patients requiring bowel prep, written instructions will be given by your doctor's office.  · If your doctor has scheduled you for an overnight stay, bring a small overnight bag with any personal items you need.    · Make arrangements in advance for transportation home by a responsible adult.      · You must make arrangements for transportation, TAXI'S, UBER'S OR LYFTS ARE NOT ALLOWED.        If you have any questions about these instructions, call (Monday - Friday)  Endoscopy [phone]

## 2020-07-18 ENCOUNTER — LAB VISIT (OUTPATIENT)
Dept: PRIMARY CARE CLINIC | Facility: CLINIC | Age: 75
End: 2020-07-18
Payer: MEDICARE

## 2020-07-18 DIAGNOSIS — Z01.818 PREOP TESTING: ICD-10-CM

## 2020-07-18 PROCEDURE — U0003 INFECTIOUS AGENT DETECTION BY NUCLEIC ACID (DNA OR RNA); SEVERE ACUTE RESPIRATORY SYNDROME CORONAVIRUS 2 (SARS-COV-2) (CORONAVIRUS DISEASE [COVID-19]), AMPLIFIED PROBE TECHNIQUE, MAKING USE OF HIGH THROUGHPUT TECHNOLOGIES AS DESCRIBED BY CMS-2020-01-R: HCPCS

## 2020-07-19 LAB — SARS-COV-2 RNA RESP QL NAA+PROBE: NOT DETECTED

## 2020-07-20 ENCOUNTER — ANESTHESIA EVENT (OUTPATIENT)
Dept: SURGERY | Facility: HOSPITAL | Age: 75
End: 2020-07-20
Payer: MEDICARE

## 2020-07-20 RX ORDER — HYDROCODONE BITARTRATE AND ACETAMINOPHEN 10; 325 MG/1; MG/1
1 TABLET ORAL 4 TIMES DAILY
COMMUNITY
End: 2021-10-20

## 2020-07-21 ENCOUNTER — ANESTHESIA (OUTPATIENT)
Dept: SURGERY | Facility: HOSPITAL | Age: 75
End: 2020-07-21
Payer: MEDICARE

## 2020-07-21 ENCOUNTER — HOSPITAL ENCOUNTER (OUTPATIENT)
Facility: HOSPITAL | Age: 75
Discharge: HOME | End: 2020-07-21
Attending: INTERNAL MEDICINE | Admitting: INTERNAL MEDICINE
Payer: MEDICARE

## 2020-07-21 VITALS
TEMPERATURE: 98 F | RESPIRATION RATE: 18 BRPM | OXYGEN SATURATION: 99 % | SYSTOLIC BLOOD PRESSURE: 146 MMHG | HEART RATE: 69 BPM | DIASTOLIC BLOOD PRESSURE: 75 MMHG

## 2020-07-21 DIAGNOSIS — K83.9 DISEASE OF BILIARY TRACT: ICD-10-CM

## 2020-07-21 PROCEDURE — 00731 ANES UPR GI NDSC PX NOS: CPT | Performed by: INTERNAL MEDICINE

## 2020-07-21 PROCEDURE — 37000009 HC ANESTHESIA EA ADD 15 MINS: Performed by: INTERNAL MEDICINE

## 2020-07-21 PROCEDURE — 25000003 PHARM REV CODE 250: Performed by: NURSE ANESTHETIST, CERTIFIED REGISTERED

## 2020-07-21 PROCEDURE — 43237 ENDOSCOPIC US EXAM ESOPH: CPT | Performed by: INTERNAL MEDICINE

## 2020-07-21 PROCEDURE — 37000008 HC ANESTHESIA 1ST 15 MINUTES: Performed by: INTERNAL MEDICINE

## 2020-07-21 PROCEDURE — 63600175 PHARM REV CODE 636 W HCPCS: Performed by: NURSE ANESTHETIST, CERTIFIED REGISTERED

## 2020-07-21 RX ORDER — PROPOFOL 10 MG/ML
VIAL (ML) INTRAVENOUS
Status: DISCONTINUED | OUTPATIENT
Start: 2020-07-21 | End: 2020-07-21

## 2020-07-21 RX ORDER — SODIUM CHLORIDE 9 MG/ML
INJECTION, SOLUTION INTRAVENOUS CONTINUOUS PRN
Status: DISCONTINUED | OUTPATIENT
Start: 2020-07-21 | End: 2020-07-21

## 2020-07-21 RX ADMIN — PROPOFOL 25 MG: 10 INJECTION, EMULSION INTRAVENOUS at 10:07

## 2020-07-21 RX ADMIN — PROPOFOL 20 MG: 10 INJECTION, EMULSION INTRAVENOUS at 10:07

## 2020-07-21 RX ADMIN — PROPOFOL 10 MG: 10 INJECTION, EMULSION INTRAVENOUS at 10:07

## 2020-07-21 RX ADMIN — SODIUM CHLORIDE: 9 INJECTION, SOLUTION INTRAVENOUS at 10:07

## 2020-07-21 NOTE — ANESTHESIA POSTPROCEDURE EVALUATION
Anesthesia Post Evaluation    Patient: Froilan Ray    Procedure(s) Performed: Procedure(s) (LRB):  ULTRASOUND, UPPER GI TRACT, ENDOSCOPIC (N/A)    Final Anesthesia Type: MAC    Patient location: endoscopy.  Patient participation: Yes- Able to Participate  Level of consciousness: awake and alert  Post-procedure vital signs: reviewed and stable  Pain management: adequate  Airway patency: patent    PONV status at discharge: No PONV  Anesthetic complications: no      Cardiovascular status: blood pressure returned to baseline and stable  Respiratory status: unassisted and room air  Hydration status: euvolemic  Follow-up not needed.          Vitals Value Taken Time   /75 07/21/20 1120   Temp 36.7 °C (98 °F) 07/21/20 1120   Pulse 69 07/21/20 1120   Resp 18 07/21/20 1120   SpO2 99 % 07/21/20 1120         No case tracking events are documented in the log.      Pain/Duarte Score: Duarte Score: 10 (7/21/2020 11:20 AM)

## 2020-07-21 NOTE — H&P
GASTROENTEROLOGY PRE-PROCEDURE H&P NOTE  Patient Name: Froilan Ray  Patient MRN: [id number]  Patient : 1945    Service date: 2020    PCP: Quinn Rangel MD    No chief complaint on file.      HPI: Patient is a 74 y.o. female with PMHx as below here for evaluation of biliary dilation on CT    Past Medical History:  Past Medical History:   Diagnosis Date    Arthritis     Back pain     Bleeding ulcer 2016    Chronic pain     DDD (degenerative disc disease), cervical     DDD (degenerative disc disease), lumbar     Diverticulitis     Encounter for blood transfusion     Hypertension     IBS (irritable bowel syndrome)     Neuropathy of both feet     Seizures     none  since     Umbilical hernia         Past Surgical History:  Past Surgical History:   Procedure Laterality Date    BACK SURGERY      BREAST SURGERY Right     lumpectomy    EYE SURGERY      cataract    HYSTERECTOMY      INTRAMEDULLARY RODDING OF TROCHANTER OF FEMUR Left 2018    Procedure: INSERTION, INTRAMEDULLARY SANTOS, FEMUR, TROCHANTER;  Surgeon: Eulalio De La Cruz MD;  Location: [facility] OR;  Service: Orthopedics;  Laterality: Left;    SPINAL CORD STIMULATOR IMPLANT  2013    and removal    SPINE SURGERY      lumbar L2-S1 decompression.    SPINE SURGERY      cervical decompression    TONSILLECTOMY          Home Medications:  Medications Prior to Admission   Medication Sig Dispense Refill Last Dose    amlodipine-benazepril (LOTREL) 5-20 mg Cap Take 1 capsule by mouth once daily.    2020 at Unknown time    cyclobenzaprine (FLEXERIL) 10 MG tablet Take 10 mg by mouth 3 (three) times daily.   2020 at Unknown time    escitalopram oxalate (LEXAPRO) 20 MG tablet Take 20 mg by mouth once daily.   2020 at Unknown time    furosemide (LASIX) 20 MG tablet Take 20 mg by mouth once daily.   2020 at Unknown time    HYDROcodone-acetaminophen (NORCO)  mg per tablet Take 1 tablet by  mouth every 4 (four) hours as needed for Pain.   7/21/2020 at 0400    pregabalin (LYRICA) 150 MG capsule Take 150 mg by mouth 3 (three) times daily.   7/20/2020 at Unknown time    oxyCODONE-acetaminophen (PERCOCET)  mg per tablet Take 1 tablet by mouth 4 (four) times daily.          Inpatient Medications:        Review of patient's allergies indicates:  No Known Allergies    Social History:   Social History     Occupational History    Not on file   Tobacco Use    Smoking status: Never Smoker    Smokeless tobacco: Never Used   Substance and Sexual Activity    Alcohol use: No    Drug use: No    Sexual activity: Not on file       Family History:   Family History   Problem Relation Age of Onset    Diabetes Mother     Hypertension Mother     Diabetes Father         insulin dependent    Hypertension Father     Heart disease Father     Coronary artery disease Father     Diabetes Sister     Diabetes Brother     COPD Brother        Review of Systems:  A 10 point review of systems was performed and was normal, except as mentioned in the HPI, including constitutional, HEENT, heme, lymph, cardiovascular, respiratory, gastrointestinal, genitourinary, neurologic, endocrine, psychiatric and musculoskeletal.      OBJECTIVE:    Physical Exam:  24 Hour Vital Sign Ranges: Temp:  [98.5 °F (36.9 °C)] 98.5 °F (36.9 °C)  Pulse:  [78] 78  Resp:  [17] 17  SpO2:  [95 %] 95 %  BP: (133)/(63) 133/63  Most recent vitals: /63 (BP Location: Left arm, Patient Position: Lying)   Pulse 78   Temp 98.5 °F (36.9 °C) (Oral)   Resp 17   SpO2 95%   Breastfeeding No    GEN: well-developed, well-nourished, awake and alert, non-toxic appearing adult  HEENT: PERRL, sclera anicteric, oral mucosa pink and moist without lesion  NECK: trachea midline; Good ROM  CV: regular rate and rhythm, no murmurs or gallops  RESP: clear to auscultation bilaterally, no wheezes, rhonci or rales  ABD: soft, non-tender, non-distended, normal  bowel sounds  EXT: no swelling or edema, 2+ pulses distally  SKIN: no rashes or jaundice  PSYCH: normal affect    Labs:   No results for input(s): WBC, MCV, PLT in the last 72 hours.    Invalid input(s): HGBAU  No results for input(s): NA, K, CL, CO2, BUN, GLU in the last 72 hours.    Invalid input(s): CREA  No results for input(s): ALB in the last 72 hours.    Invalid input(s): ALKP, SGOT, SGPT, TBIL, DBIL, TPRO  No results for input(s): PT, INR, PTT in the last 72 hours.      IMPRESSION / RECOMMENDATIONS:  EUS. Risks, benefits, alternative discussed in detail with patient / family regarding anticipated procedure and possible complications.     Marcio Nguyễn III  7/21/2020  10:14 AM

## 2020-07-21 NOTE — [employer] PATIENT INSTRUCTIONS
Discharge Summary/Instructions after an Endoscopic Procedure  Patient Name: Froilan Ray  Patient MRN: [id number]  Patient YOB: 1945  Tuesday, July 21, 2020  Marcio Nguyễn III, MD  RESTRICTIONS:  During your procedure today, you received medications for sedation.  These   medications may affect your judgment, balance and coordination.  Therefore,   for 24 hours, you have the following restrictions:   - DO NOT drive a car, operate machinery, make legal/financial decisions,   sign important papers or drink alcohol.    ACTIVITY:  Today: no heavy lifting, straining or running due to procedural   sedation/anesthesia.  The following day: return to full activity including work.  DIET:  Eat and drink normally unless instructed otherwise.     TREATMENT FOR COMMON SIDE EFFECTS:  - Mild abdominal pain, nausea, belching, bloating or excessive gas:  rest,   eat lightly and use a heating pad.  - Sore Throat: treat with throat lozenges and/or gargle with warm salt   water.  - Because air was used during the procedure, expelling large amounts of air   from your rectum or belching is normal.  - If a bowel prep was taken, you may not have a bowel movement for 1-3 days.    This is normal.  SYMPTOMS TO WATCH FOR AND REPORT TO YOUR PHYSICIAN:  1. Abdominal pain or bloating, other than gas cramps.  2. Chest pain.  3. Back pain.  4. Signs of infection such as: chills or fever occurring within 24 hours   after the procedure.  5. Rectal bleeding, which would show as bright red, maroon, or black stools.   (A tablespoon of blood from the rectum is not serious, especially if   hemorrhoids are present.)  6. Vomiting.  7. Weakness or dizziness.  GO DIRECTLY TO THE NEAREST EMERGENCY ROOM IF YOU HAVE ANY OF THE FOLLOWING:      Difficulty breathing              Chills and/or fever over 101 F   Persistent vomiting and/or vomiting blood   Severe abdominal pain   Severe chest pain   Black, tarry stools   Bleeding- more than one  tablespoon   Any other symptom or condition that you feel may need urgent attention  Your doctor recommends these additional instructions:  If any biopsies were taken, your doctors clinic will contact you in 1 to 2   weeks with any results.  - Discharge patient to home.   - Patient has a contact number available for emergencies.  The signs and   symptoms of potential delayed complications were discussed with the   patient.  Return to normal activities tomorrow.  Written discharge   instructions were provided to the patient.   - Resume regular diet.   - Continue present medications.  For questions, problems or results please call your physician - Marcio Nguyễn III, MD at Work:  [phone].  [facility], EMERGENCY ROOM PHONE NUMBER: [phone]  IF A COMPLICATION OR EMERGENCY SITUATION ARISES AND YOU ARE UNABLE TO REACH   YOUR PHYSICIAN - GO DIRECTLY TO THE EMERGENCY ROOM.  Marcio Nguyễn III, MD  7/21/2020 10:57:50 AM  This report has been verified and signed electronically.  [employer]

## 2020-07-21 NOTE — ANESTHESIA PREPROCEDURE EVALUATION
07/21/2020  Froilan Ray is a 74 y.o., female.    Patient Active Problem List   Diagnosis    Chronic pain    Encounter for postoperative wound check    Uterine mass    Closed nondisplaced intertrochanteric fracture of left femur    HTN (hypertension)    Peripheral neuropathy    Seizure disorder    Open wound of left heel    Colitis    ACP (advance care planning)    Drug-induced constipation    Disease of biliary tract       Past Surgical History:   Procedure Laterality Date    BACK SURGERY      BREAST SURGERY Right     lumpectomy    EYE SURGERY      cataract    HYSTERECTOMY      INTRAMEDULLARY RODDING OF TROCHANTER OF FEMUR Left 12/11/2018    Procedure: INSERTION, INTRAMEDULLARY SANTOS, FEMUR, TROCHANTER;  Surgeon: Eulalio De La Cruz MD;  Location: [facility];  Service: Orthopedics;  Laterality: Left;    SPINAL CORD STIMULATOR IMPLANT  09/18/2013    and removal    SPINE SURGERY  2006    lumbar L2-S1 decompression.    SPINE SURGERY      cervical decompression    TONSILLECTOMY          Tobacco Use:  The patient  reports that she has never smoked. She has never used smokeless tobacco.     Results for orders placed or performed during the hospital encounter of 03/02/20   EKG 12-lead    Collection Time: 03/02/20  9:03 AM    Narrative    Test Reason : R53.1,    Vent. Rate : 092 BPM     Atrial Rate : 092 BPM     P-R Int : 152 ms          QRS Dur : 084 ms      QT Int : 360 ms       P-R-T Axes : 068 016 068 degrees     QTc Int : 445 ms    Normal sinus rhythm  Normal ECG  When compared with ECG of 12-SEP-2019 10:05,  No significant change was found  Confirmed by Rivera REID, Levi ([id number]) on 3/3/2020 10:36:16 AM    Referred By: [email or web address]   SELF           Confirmed By:Levi Stafford MD             Lab Results   Component Value Date    WBC 3.85 (L) 07/17/2020    HGB 9.6 (L) 07/17/2020    HCT 32.4 (L)  07/17/2020    MCV 90 07/17/2020     07/17/2020     BMP  Lab Results   Component Value Date     07/17/2020    K 4.6 07/17/2020     07/17/2020    CO2 27 07/17/2020    BUN 20 07/17/2020    CREATININE 0.9 07/17/2020    CALCIUM 9.3 07/17/2020    ANIONGAP 8 07/17/2020     07/17/2020     (H) 03/03/2020     (H) 03/02/2020       No results found for this or any previous visit.        Anesthesia Evaluation          Review of Systems  EENT/Dental:   Eyes: (right eye blindness) Ears General/Symptom(s) (decrease hearing )    Cardiovascular:   Hypertension    Hepatic/GI:   PUD, (in 2016)  Hepatic/GI Symptoms: (history of colitis in 2017)    Musculoskeletal:   Arthritis (cervical and lumbar DDD)   Musculoskeletal General/Symptoms: (reports limited neck movement, she is s/p neck fusion)    Neurological:   Neuromuscular Disease, (bilateral feet neuropathy) Seizures (last seizure was 3 years ago, and she is no longer on depakote.), well controlled    Easy bruising, currently some visible in her arms.      Physical Exam  General:  Well nourished    Airway/Jaw/Neck:  Airway Findings: Mouth Opening: Normal Tongue: Normal  General Airway Assessment: Adult  Mallampati: II  TM Distance: Normal, at least 6 cm  Jaw/Neck Findings:  Neck ROM: Normal ROM     Eyes/Ears/Nose:  Eyes/Ears/Nose Findings:    Dental:  Dental Findings: (reports chipped tooth) In tact   Chest/Lungs:  Chest/Lungs Findings: Clear to auscultation, Normal Respiratory Rate     Heart/Vascular:  Heart Findings: Rate: Normal  Rhythm: Regular Rhythm  Sounds: Normal     Abdomen:  Abdomen Findings:     Musculoskeletal:  Musculoskeletal Findings:    Skin:  Skin Findings:     Mental Status:  Mental Status Findings:  Cooperative, Alert and Oriented         Anesthesia Plan  Type of Anesthesia, risks & benefits discussed:  Anesthesia Type:  MAC  Patient's Preference: MAC  Intra-op Monitoring Plan: standard ASA monitors  Intra-op Monitoring Plan  Comments:   Post Op Pain Control Plan: IV/PO Opioids PRN  Post Op Pain Control Plan Comments:   Induction:   IV  Beta Blocker:         Informed Consent: Patient understands risks and agrees with Anesthesia plan.  Questions answered. Anesthesia consent signed with patient.  ASA Score: 3     Day of Surgery Review of History & Physical:    H&P update referred to the provider.     Anesthesia Plan Notes: MAC with propofol.        Ready For Surgery From Anesthesia Perspective.

## 2020-07-21 NOTE — TRANSFER OF CARE
Anesthesia Transfer of Care Note    Patient: Froilan Ray    Procedure(s) Performed: Procedure(s) (LRB):  ULTRASOUND, UPPER GI TRACT, ENDOSCOPIC (N/A)    Patient location: GI    Anesthesia Type: MAC    Post pain: adequate analgesia    Post assessment: no apparent anesthetic complications    Post vital signs: stable    Level of consciousness: awake and alert    Nausea/Vomiting: no nausea/vomiting    Complications: none    Transfer of care protocol was followed      Last vitals:   Visit Vitals  /63 (BP Location: Left arm, Patient Position: Lying)   Pulse 78   Temp 36.9 °C (98.5 °F) (Oral)   Resp 17   SpO2 95%   Breastfeeding No

## 2020-08-11 DIAGNOSIS — M79.605 LEFT LEG PAIN: ICD-10-CM

## 2020-08-11 DIAGNOSIS — M79.89 LEFT LEG SWELLING: Primary | ICD-10-CM

## 2020-08-12 ENCOUNTER — HOSPITAL ENCOUNTER (OUTPATIENT)
Dept: RADIOLOGY | Facility: HOSPITAL | Age: 75
Discharge: HOME | End: 2020-08-12
Attending: SURGERY
Payer: MEDICARE

## 2020-08-12 DIAGNOSIS — M79.89 LEFT LEG SWELLING: ICD-10-CM

## 2020-08-12 DIAGNOSIS — M79.605 LEFT LEG PAIN: ICD-10-CM

## 2020-08-12 PROCEDURE — 93971 EXTREMITY STUDY: CPT | Mod: TC,LT

## 2020-11-15 ENCOUNTER — HOSPITAL ENCOUNTER (EMERGENCY)
Facility: HOSPITAL | Age: 75
Discharge: HOME | End: 2020-11-15
Attending: EMERGENCY MEDICINE
Payer: MEDICARE

## 2020-11-15 VITALS
RESPIRATION RATE: 20 BRPM | BODY MASS INDEX: 30.67 KG/M2 | SYSTOLIC BLOOD PRESSURE: 160 MMHG | TEMPERATURE: 97 F | HEART RATE: 90 BPM | DIASTOLIC BLOOD PRESSURE: 77 MMHG | WEIGHT: 190 LBS | OXYGEN SATURATION: 100 %

## 2020-11-15 DIAGNOSIS — R10.32 LEFT LOWER QUADRANT ABDOMINAL PAIN: Primary | ICD-10-CM

## 2020-11-15 LAB
ALBUMIN SERPL BCP-MCNC: 4 G/DL (ref 3.5–5.2)
ALP SERPL-CCNC: 70 U/L (ref 55–135)
ALT SERPL W/O P-5'-P-CCNC: 14 U/L (ref 10–44)
ANION GAP SERPL CALC-SCNC: 8 MMOL/L (ref 8–16)
AST SERPL-CCNC: 16 U/L (ref 10–40)
BASOPHILS # BLD AUTO: 0.04 K/UL (ref 0–0.2)
BASOPHILS NFR BLD: 0.6 % (ref 0–1.9)
BILIRUB SERPL-MCNC: 0.9 MG/DL (ref 0.1–1)
BILIRUB UR QL STRIP: NEGATIVE
BUN SERPL-MCNC: 15 MG/DL (ref 8–23)
CALCIUM SERPL-MCNC: 9.2 MG/DL (ref 8.7–10.5)
CHLORIDE SERPL-SCNC: 107 MMOL/L (ref 95–110)
CLARITY UR: CLEAR
CO2 SERPL-SCNC: 21 MMOL/L (ref 23–29)
COLOR UR: YELLOW
CREAT SERPL-MCNC: 0.7 MG/DL (ref 0.5–1.4)
DIFFERENTIAL METHOD: ABNORMAL
EOSINOPHIL # BLD AUTO: 0 K/UL (ref 0–0.5)
EOSINOPHIL NFR BLD: 0.1 % (ref 0–8)
ERYTHROCYTE [DISTWIDTH] IN BLOOD BY AUTOMATED COUNT: 14.1 % (ref 11.5–14.5)
EST. GFR  (AFRICAN AMERICAN): >60 ML/MIN/1.73 M^2
EST. GFR  (NON AFRICAN AMERICAN): >60 ML/MIN/1.73 M^2
GLUCOSE SERPL-MCNC: 98 MG/DL (ref 70–110)
GLUCOSE UR QL STRIP: NEGATIVE
HCT VFR BLD AUTO: 33.4 % (ref 37–48.5)
HGB BLD-MCNC: 10.4 G/DL (ref 12–16)
HGB UR QL STRIP: NEGATIVE
IMM GRANULOCYTES # BLD AUTO: 0.01 K/UL (ref 0–0.04)
IMM GRANULOCYTES NFR BLD AUTO: 0.1 % (ref 0–0.5)
KETONES UR QL STRIP: ABNORMAL
LEUKOCYTE ESTERASE UR QL STRIP: NEGATIVE
LIPASE SERPL-CCNC: 24 U/L (ref 4–60)
LYMPHOCYTES # BLD AUTO: 1.8 K/UL (ref 1–4.8)
LYMPHOCYTES NFR BLD: 24.6 % (ref 18–48)
MCH RBC QN AUTO: 26 PG (ref 27–31)
MCHC RBC AUTO-ENTMCNC: 31.1 G/DL (ref 32–36)
MCV RBC AUTO: 84 FL (ref 82–98)
MONOCYTES # BLD AUTO: 0.6 K/UL (ref 0.3–1)
MONOCYTES NFR BLD: 7.9 % (ref 4–15)
NEUTROPHILS # BLD AUTO: 4.7 K/UL (ref 1.8–7.7)
NEUTROPHILS NFR BLD: 66.7 % (ref 38–73)
NITRITE UR QL STRIP: NEGATIVE
NRBC BLD-RTO: 0 /100 WBC
PH UR STRIP: 7 [PH] (ref 5–8)
PLATELET # BLD AUTO: 443 K/UL (ref 150–350)
PMV BLD AUTO: 9.3 FL (ref 9.2–12.9)
POTASSIUM SERPL-SCNC: 3.7 MMOL/L (ref 3.5–5.1)
PROT SERPL-MCNC: 6.8 G/DL (ref 6–8.4)
PROT UR QL STRIP: ABNORMAL
RBC # BLD AUTO: 4 M/UL (ref 4–5.4)
SARS-COV-2 RDRP RESP QL NAA+PROBE: NEGATIVE
SODIUM SERPL-SCNC: 136 MMOL/L (ref 136–145)
SP GR UR STRIP: >1.03 (ref 1–1.03)
URN SPEC COLLECT METH UR: ABNORMAL
UROBILINOGEN UR STRIP-ACNC: NEGATIVE EU/DL
WBC # BLD AUTO: 7.11 K/UL (ref 3.9–12.7)

## 2020-11-15 PROCEDURE — 25500020 PHARM REV CODE 255: Performed by: NURSE PRACTITIONER

## 2020-11-15 PROCEDURE — 99284 EMERGENCY DEPT VISIT MOD MDM: CPT | Mod: 25

## 2020-11-15 PROCEDURE — 85025 COMPLETE CBC W/AUTO DIFF WBC: CPT

## 2020-11-15 PROCEDURE — 96374 THER/PROPH/DIAG INJ IV PUSH: CPT

## 2020-11-15 PROCEDURE — 96361 HYDRATE IV INFUSION ADD-ON: CPT

## 2020-11-15 PROCEDURE — 25000003 PHARM REV CODE 250: Performed by: NURSE PRACTITIONER

## 2020-11-15 PROCEDURE — 96375 TX/PRO/DX INJ NEW DRUG ADDON: CPT | Mod: 59

## 2020-11-15 PROCEDURE — 80053 COMPREHEN METABOLIC PANEL: CPT

## 2020-11-15 PROCEDURE — 81003 URINALYSIS AUTO W/O SCOPE: CPT

## 2020-11-15 PROCEDURE — 63600175 PHARM REV CODE 636 W HCPCS: Performed by: NURSE PRACTITIONER

## 2020-11-15 PROCEDURE — U0002 COVID-19 LAB TEST NON-CDC: HCPCS

## 2020-11-15 PROCEDURE — 83690 ASSAY OF LIPASE: CPT

## 2020-11-15 RX ORDER — DICYCLOMINE HYDROCHLORIDE 20 MG/1
20 TABLET ORAL 2 TIMES DAILY
Qty: 10 TABLET | Refills: 0 | Status: SHIPPED | OUTPATIENT
Start: 2020-11-15 | End: 2020-11-20

## 2020-11-15 RX ORDER — FAMOTIDINE 20 MG/1
20 TABLET, FILM COATED ORAL DAILY PRN
Status: ON HOLD | COMMUNITY
End: 2022-06-10 | Stop reason: HOSPADM

## 2020-11-15 RX ORDER — ONDANSETRON 2 MG/ML
4 INJECTION INTRAMUSCULAR; INTRAVENOUS
Status: COMPLETED | OUTPATIENT
Start: 2020-11-15 | End: 2020-11-15

## 2020-11-15 RX ORDER — ACETAMINOPHEN 325 MG/1
650 TABLET ORAL EVERY 8 HOURS PRN
Status: ON HOLD | COMMUNITY
End: 2024-02-18

## 2020-11-15 RX ORDER — OXYCODONE AND ACETAMINOPHEN 10; 325 MG/1; MG/1
1 TABLET ORAL 4 TIMES DAILY PRN
COMMUNITY
End: 2021-10-20 | Stop reason: SDUPTHER

## 2020-11-15 RX ORDER — MORPHINE SULFATE 4 MG/ML
4 INJECTION, SOLUTION INTRAMUSCULAR; INTRAVENOUS
Status: COMPLETED | OUTPATIENT
Start: 2020-11-15 | End: 2020-11-15

## 2020-11-15 RX ADMIN — ONDANSETRON 4 MG: 2 INJECTION INTRAMUSCULAR; INTRAVENOUS at 03:11

## 2020-11-15 RX ADMIN — MORPHINE SULFATE 4 MG: 4 INJECTION INTRAVENOUS at 04:11

## 2020-11-15 RX ADMIN — SODIUM CHLORIDE 1000 ML: 0.9 INJECTION, SOLUTION INTRAVENOUS at 03:11

## 2020-11-15 RX ADMIN — IOHEXOL 100 ML: 350 INJECTION, SOLUTION INTRAVENOUS at 03:11

## 2020-11-15 NOTE — ED PROVIDER NOTES
Encounter Date: 11/15/2020       History     Chief Complaint   Patient presents with    Abdominal Pain     Patient is a 75-year-old female with medical history of IBS, arthritis, hypertension presenting to the ED abdominal pain, nausea and vomiting since Thursday.  Patient states she has had colitis in the past and pain is similar.  Patient states today pain increased.  Patient states she has not been able to eat or drink since Thursday due to pain, nausea and vomiting.  Patient denies any fever, chills, chest pain or shortness of breath.    The history is provided by the patient.     Review of patient's allergies indicates:  No Known Allergies  Past Medical History:   Diagnosis Date    Arthritis     Back pain     Bleeding ulcer 07/2016    Chronic pain     DDD (degenerative disc disease), cervical     DDD (degenerative disc disease), lumbar     Diverticulitis     Encounter for blood transfusion     Hypertension     IBS (irritable bowel syndrome)     Neuropathy of both feet     Seizures     none  since 2017    Umbilical hernia 2020     Past Surgical History:   Procedure Laterality Date    BACK SURGERY      BREAST SURGERY Right     lumpectomy    ENDOSCOPIC ULTRASOUND OF UPPER GASTROINTESTINAL TRACT N/A 7/21/2020    Procedure: ULTRASOUND, UPPER GI TRACT, ENDOSCOPIC;  Surgeon: Marcio Nguyễn III, MD;  Location: [facility];  Service: Endoscopy;  Laterality: N/A;    EYE SURGERY      cataract    HYSTERECTOMY      INTRAMEDULLARY RODDING OF TROCHANTER OF FEMUR Left 12/11/2018    Procedure: INSERTION, INTRAMEDULLARY SANTOS, FEMUR, TROCHANTER;  Surgeon: Eulalio De La Cruz MD;  Location: [facility];  Service: Orthopedics;  Laterality: Left;    SPINAL CORD STIMULATOR IMPLANT  09/18/2013    and removal    SPINE SURGERY  2006    lumbar L2-S1 decompression.    SPINE SURGERY      cervical decompression    TONSILLECTOMY       Family History   Problem Relation Age of Onset    Diabetes Mother     Hypertension  Mother     Diabetes Father         insulin dependent    Hypertension Father     Heart disease Father     Coronary artery disease Father     Diabetes Sister     Diabetes Brother     COPD Brother      Social History     Tobacco Use    Smoking status: Never Smoker    Smokeless tobacco: Never Used   Substance Use Topics    Alcohol use: No    Drug use: No     Review of Systems   Constitutional: Positive for activity change and appetite change. Negative for chills and fever.   Respiratory: Negative for cough, chest tightness and shortness of breath.    Gastrointestinal: Positive for abdominal pain, nausea and vomiting. Negative for constipation and diarrhea.   Genitourinary: Negative for decreased urine volume, difficulty urinating and urgency.   Musculoskeletal: Negative for arthralgias and myalgias.   Skin: Negative for color change.   Allergic/Immunologic: Negative for immunocompromised state.   Neurological: Negative for dizziness, syncope, weakness, numbness and headaches.   All other systems reviewed and are negative.      Physical Exam     Initial Vitals   BP Pulse Resp Temp SpO2   11/15/20 1343 11/15/20 1343 11/15/20 1343 11/15/20 1844 11/15/20 1343   (!) 143/85 87 20 97.3 °F (36.3 °C) 99 %      MAP       --                Physical Exam    Nursing note and vitals reviewed.  Constitutional: Vital signs are normal. She appears well-developed and well-nourished. She is cooperative. No distress.   HENT:   Head: Normocephalic and atraumatic.   Mouth/Throat: Uvula is midline, oropharynx is clear and moist and mucous membranes are normal.   Eyes: Conjunctivae, EOM and lids are normal. Pupils are equal, round, and reactive to light.   Neck: Trachea normal, normal range of motion and phonation normal. Neck supple.   Cardiovascular: Normal rate, regular rhythm and intact distal pulses.   Pulses:       Radial pulses are 2+ on the right side and 2+ on the left side.   Pulmonary/Chest: Effort normal and breath  sounds normal.   Abdominal: Soft. Normal appearance and bowel sounds are normal. There is abdominal tenderness in the left lower quadrant. There is no rebound, no guarding and no CVA tenderness.   Neurological: She is alert and oriented to person, place, and time. She has normal strength. No sensory deficit. GCS eye subscore is 4. GCS verbal subscore is 5. GCS motor subscore is 6.   Skin: Skin is warm, dry and intact. Capillary refill takes 2 to 3 seconds. No pallor.         ED Course   Procedures  Labs Reviewed   CBC W/ AUTO DIFFERENTIAL - Abnormal; Notable for the following components:       Result Value    Hemoglobin 10.4 (*)     Hematocrit 33.4 (*)     MCH 26.0 (*)     MCHC 31.1 (*)     Platelets 443 (*)     All other components within normal limits   COMPREHENSIVE METABOLIC PANEL - Abnormal; Notable for the following components:    CO2 21 (*)     All other components within normal limits   URINALYSIS, REFLEX TO URINE CULTURE - Abnormal; Notable for the following components:    Specific Gravity, UA >1.030 (*)     Protein, UA Trace (*)     Ketones, UA 1+ (*)     All other components within normal limits    Narrative:     Specimen Source->Urine   LIPASE   SARS-COV-2 RNA AMPLIFICATION, QUAL          Imaging Results          CT Abdomen Pelvis With Contrast (Final result)  Result time 11/15/20 15:58:39    Final result by Bryan Stevens MD (11/15/20 15:58:39)                 Impression:      1. No acute findings throughout the abdomen and pelvis.  2. Diverticulosis.  3. Unchanged left renal calculus.      Electronically signed by: Bryan Stevens MD  Date:    11/15/2020  Time:    15:58             Narrative:      CMS MANDATED QUALITY DATA - CT RADIATION - 436    All CT scans at this facility utilize dose modulation, iterative reconstruction, and/or weight based dosing when appropriate to reduce radiation dose to as low as reasonably achievable.    EXAMINATION:  CT ABDOMEN PELVIS WITH CONTRAST    CLINICAL  HISTORY:  Abdominal pain, acute, nonlocalized;    TECHNIQUE:  CT abdomen and pelvis with 100 mL Omnipaque 350.    COMPARISON:  03/03/2020    FINDINGS:  CT ABDOMEN:    Calcified granuloma lies in lower lobe, and visualized lung bases are otherwise clear.    Tiny left hepatic lobe hypodensity is unchanged and likely to represent a cyst.  Gallbladder, pancreas, spleen, and adrenals are normal.  Non-obstructing calculus lies in the inferior left renal collecting system.    Aorta is of normal caliber.    No intestinal abnormality identified.  A normal appendix is present.  No free intraperitoneal gas.  Tiny fat containing periumbilical hernia is present.    Moderate convex left lumbar spine curvature and advanced degenerative changes are present.  Postoperative changes of lower lumbar spine are noted.    CT PELVIS:    Left femoral intramedullary nail and proximal interlocking screw are present and show no complicating feature.  Chronic osseous remodeling of right pubic body suggest old healed fracture.    Uterus has been removed.  No free pelvic fluid or adnexal mass.  Few colonic diverticula are present.  Bladder is normal.  Tiny fat containing left inguinal hernia unchanged.                                 Medical Decision Making:   Initial Assessment:   Emergent evaluation of a 75-year-old female presenting to the ED for left lower quadrant pain with associated nausea vomiting.  Patient is concerned for incarcerated hernia.  Patient states she has had similar pain in the past due to colitis.  Patient does also have a history of IBS.  Patient denies taking anything for her pain.  On exam patient is A&O x3.  Vital signs stable.  Not febrile and nontoxic appearing.  Breath sounds clear bilaterally.  Abdomen soft with left lower quadrant tenderness to palpation.  Cap refill less than 3 seconds.  Strength 5/5 in all extremities.  No change in pain with change in position.  Differential Diagnosis:   Differential diagnoses  include but are not limited to diverticulitis, gastroenteritis, colitis, irritable bowel syndrome, urinary tract infection, hernia, intestinal obstruction, others.  Clinical Tests:   Lab Tests: Ordered and Reviewed  The following lab test(s) were unremarkable: CMP, CBC, Lipase and Urinalysis       <> Summary of Lab: Patient's CBC unremarkable.  No leukocytosis noted.  H&H stable at 10.4 and 33.4.  CMP unremarkable.  Lipase negative.  UA negative for any acute infectious process.  Radiological Study: Ordered and Reviewed  ED Management:  I will get labs, imaging, medicate, hydrate and reassess.  I discussed this case with my supervising physician.    Patient's labs unremarkable.  CT negative for any acute abnormalities.  Patient reassessed.  Patient states feeling better after pain medication.  Patient updated on lab and imaging results.  Advised we will prescribe Bentyl for pain and Zofran for nausea.  [place] diet.  Increase fluids and rest.  Follow-up with PCP if symptoms do not resolve.  Patient verbalized understanding of this plan of care.  All questions and concerns addressed.    Patient is hemodynamically stable, vital signs are normal. Discharge instructions given. Return to ED precautions discussed. Follow up as directed. Pt verbalized understanding of this plan.  Pt is stable for discharge.               Attending Attestation:   Physician Attestation Statement for Resident:  As the supervising MD . -: I have personally seen and examined the patient.  I have reviewed and agree with the provider's findings, diagnostic interpretations and treatment plans as documented.  The patient presented with abdominal discomfort.  Abdomen was soft on exam.  Imaging was reassuring.  Lab evaluation stable.  She was discharged home follow-up the patient    Clayton Cantu MD MPH  11/16/2020 11:31 AM                                 Clinical Impression:     ICD-10-CM ICD-9-CM   1. Left lower quadrant abdominal pain  R10.32 789.04                       Disposition:   Disposition: Discharged  Condition: Stable     ED Disposition Condition    Discharge Stable        ED Prescriptions     Medication Sig Dispense Start Date End Date Auth. Provider    dicyclomine (BENTYL) 20 mg tablet Take 1 tablet (20 mg total) by mouth 2 (two) times daily. for 5 days 10 tablet 11/15/2020 11/20/2020 Sabrina Pedersen NP        Follow-up Information     Follow up With Specialties Details Why Contact Info    Quinn Rangel MD Internal Medicine Schedule an appointment as soon as possible for a visit  As needed [address] [facility] [address] [place] [id number]  [phone]                                         Sabrina Pedersen NP  11/15/20 1906       Clayton Cantu Jr., MD  11/16/20 1133

## 2020-11-16 NOTE — DISCHARGE INSTRUCTIONS
Your labs and imaging in the ER unremarkable.  We have prescribed you medications for abdominal cramping.  Increase fluids and rest.  [place] diet.  Please follow-up with your PCP if symptoms continue.      Our goal in the emergency department is to always give you outstanding care and exceptional service. You may receive a survey by mail or e-mail in the next week regarding your experience in our ED. We would greatly appreciate your completing and returning the survey. Your feedback provides us with a way to recognize our staff who give very good care and it helps us learn how to improve when your experience was below our aspiration of excellence.

## 2021-01-19 DIAGNOSIS — M43.10 SPONDYLOLISTHESIS, SITE UNSPECIFIED: ICD-10-CM

## 2021-01-19 DIAGNOSIS — M47.897 OTHER SPONDYLOSIS, LUMBOSACRAL REGION: ICD-10-CM

## 2021-01-19 DIAGNOSIS — M96.1 POSTLAMINECTOMY SYNDROME: ICD-10-CM

## 2021-01-19 DIAGNOSIS — M25.552 LEFT HIP PAIN: ICD-10-CM

## 2021-01-19 DIAGNOSIS — M47.27 OTHER SPONDYLOSIS WITH RADICULOPATHY, LUMBOSACRAL REGION: Primary | ICD-10-CM

## 2021-01-27 DIAGNOSIS — M96.1 POSTLAMINECTOMY SYNDROME, NOT ELSEWHERE CLASSIFIED: ICD-10-CM

## 2021-01-27 DIAGNOSIS — M47.897 OTHER SPONDYLOSIS, LUMBOSACRAL REGION: ICD-10-CM

## 2021-01-27 DIAGNOSIS — M25.552 LEFT HIP PAIN: ICD-10-CM

## 2021-01-27 DIAGNOSIS — M47.27 OTHER SPONDYLOSIS WITH RADICULOPATHY, LUMBOSACRAL REGION: Primary | ICD-10-CM

## 2021-01-27 DIAGNOSIS — M47.896 OTHER SPONDYLOSIS, LUMBAR REGION: ICD-10-CM

## 2021-01-28 ENCOUNTER — HOSPITAL ENCOUNTER (OUTPATIENT)
Dept: RADIOLOGY | Facility: HOSPITAL | Age: 76
Discharge: HOME | End: 2021-01-28
Attending: PAIN MEDICINE
Payer: MEDICARE

## 2021-01-28 DIAGNOSIS — M47.896 OTHER SPONDYLOSIS, LUMBAR REGION: ICD-10-CM

## 2021-01-28 DIAGNOSIS — M47.897 OTHER SPONDYLOSIS, LUMBOSACRAL REGION: ICD-10-CM

## 2021-01-28 DIAGNOSIS — M47.27 OTHER SPONDYLOSIS WITH RADICULOPATHY, LUMBOSACRAL REGION: ICD-10-CM

## 2021-01-28 DIAGNOSIS — M43.10 SPONDYLOLISTHESIS, SITE UNSPECIFIED: ICD-10-CM

## 2021-01-28 DIAGNOSIS — M96.1 POSTLAMINECTOMY SYNDROME, NOT ELSEWHERE CLASSIFIED: ICD-10-CM

## 2021-01-28 DIAGNOSIS — M25.552 LEFT HIP PAIN: ICD-10-CM

## 2021-01-28 DIAGNOSIS — M96.1 POSTLAMINECTOMY SYNDROME: ICD-10-CM

## 2021-01-28 PROCEDURE — 72148 MRI LUMBAR SPINE W/O DYE: CPT | Mod: TC,PO

## 2021-01-28 PROCEDURE — 72120 X-RAY BEND ONLY L-S SPINE: CPT | Mod: TC,PO

## 2021-03-28 RX ORDER — ESCITALOPRAM OXALATE 20 MG/1
20 TABLET ORAL DAILY
Qty: 90 TABLET | Refills: 0 | Status: SHIPPED | OUTPATIENT
Start: 2021-03-28 | End: 2021-07-06

## 2021-04-09 RX ORDER — MORPHINE SULFATE 15 MG/1
15 TABLET ORAL 2 TIMES DAILY
COMMUNITY
End: 2021-10-20

## 2021-04-09 RX ORDER — METOLAZONE 2.5 MG/1
2.5 TABLET ORAL
Status: ON HOLD | COMMUNITY
End: 2022-01-13

## 2021-04-09 RX ORDER — DIVALPROEX SODIUM 500 MG/1
500 TABLET, FILM COATED, EXTENDED RELEASE ORAL
Status: ON HOLD | COMMUNITY
End: 2022-01-13

## 2021-04-09 RX ORDER — POTASSIUM CHLORIDE 20 MEQ/1
20 TABLET, EXTENDED RELEASE ORAL 2 TIMES DAILY
Status: ON HOLD | COMMUNITY
End: 2022-01-13

## 2021-04-21 ENCOUNTER — PATIENT OUTREACH (OUTPATIENT)
Dept: ADMINISTRATIVE | Facility: HOSPITAL | Age: 76
End: 2021-04-21

## 2021-09-28 ENCOUNTER — TELEPHONE (OUTPATIENT)
Dept: FAMILY MEDICINE | Facility: CLINIC | Age: 76
End: 2021-09-28

## 2021-10-20 ENCOUNTER — OFFICE VISIT (OUTPATIENT)
Dept: FAMILY MEDICINE | Facility: CLINIC | Age: 76
End: 2021-10-20
Payer: MEDICARE

## 2021-10-20 VITALS
HEART RATE: 98 BPM | HEIGHT: 66 IN | DIASTOLIC BLOOD PRESSURE: 80 MMHG | BODY MASS INDEX: 27.33 KG/M2 | OXYGEN SATURATION: 96 % | WEIGHT: 170.06 LBS | TEMPERATURE: 98 F | SYSTOLIC BLOOD PRESSURE: 150 MMHG

## 2021-10-20 DIAGNOSIS — I10 HYPERTENSION, UNSPECIFIED TYPE: Primary | ICD-10-CM

## 2021-10-20 DIAGNOSIS — G89.4 CHRONIC PAIN SYNDROME: ICD-10-CM

## 2021-10-20 DIAGNOSIS — M47.27 SPONDYLOSIS OF LUMBOSACRAL SPINE WITH RADICULOPATHY: ICD-10-CM

## 2021-10-20 DIAGNOSIS — Z23 NEED FOR PROPHYLACTIC VACCINATION AGAINST STREPTOCOCCUS PNEUMONIAE (PNEUMOCOCCUS): ICD-10-CM

## 2021-10-20 PROCEDURE — 99205 OFFICE O/P NEW HI 60 MIN: CPT | Mod: 25,S$GLB,, | Performed by: FAMILY MEDICINE

## 2021-10-20 PROCEDURE — G0008 FLU VACCINE - QUADRIVALENT - ADJUVANTED: ICD-10-PCS | Mod: S$GLB,,, | Performed by: FAMILY MEDICINE

## 2021-10-20 PROCEDURE — 90670 PNEUMOCOCCAL CONJUGATE VACCINE 13-VALENT LESS THAN 5YO & GREATER THAN: ICD-10-PCS | Mod: S$GLB,,, | Performed by: FAMILY MEDICINE

## 2021-10-20 PROCEDURE — 99999 PR PBB SHADOW E&M-EST. PATIENT-LVL IV: ICD-10-PCS | Mod: PBBFAC,,, | Performed by: FAMILY MEDICINE

## 2021-10-20 PROCEDURE — 90694 FLU VACCINE - QUADRIVALENT - ADJUVANTED: ICD-10-PCS | Mod: S$GLB,,, | Performed by: FAMILY MEDICINE

## 2021-10-20 PROCEDURE — 99205 PR OFFICE/OUTPT VISIT, NEW, LEVL V, 60-74 MIN: ICD-10-PCS | Mod: 25,S$GLB,, | Performed by: FAMILY MEDICINE

## 2021-10-20 PROCEDURE — 99999 PR PBB SHADOW E&M-EST. PATIENT-LVL IV: CPT | Mod: PBBFAC,,, | Performed by: FAMILY MEDICINE

## 2021-10-20 PROCEDURE — 90694 VACC AIIV4 NO PRSRV 0.5ML IM: CPT | Mod: S$GLB,,, | Performed by: FAMILY MEDICINE

## 2021-10-20 PROCEDURE — G0009 ADMIN PNEUMOCOCCAL VACCINE: HCPCS | Mod: S$GLB,,, | Performed by: FAMILY MEDICINE

## 2021-10-20 PROCEDURE — G0009 PNEUMOCOCCAL CONJUGATE VACCINE 13-VALENT LESS THAN 5YO & GREATER THAN: ICD-10-PCS | Mod: S$GLB,,, | Performed by: FAMILY MEDICINE

## 2021-10-20 PROCEDURE — G0008 ADMIN INFLUENZA VIRUS VAC: HCPCS | Mod: S$GLB,,, | Performed by: FAMILY MEDICINE

## 2021-10-20 PROCEDURE — 90670 PCV13 VACCINE IM: CPT | Mod: S$GLB,,, | Performed by: FAMILY MEDICINE

## 2021-10-20 RX ORDER — OXYCODONE AND ACETAMINOPHEN 10; 325 MG/1; MG/1
1 TABLET ORAL 4 TIMES DAILY PRN
Qty: 120 TABLET | Refills: 0 | Status: SHIPPED | OUTPATIENT
Start: 2021-10-20 | End: 2021-11-26

## 2021-10-20 RX ORDER — AMLODIPINE AND BENAZEPRIL HYDROCHLORIDE 5; 20 MG/1; MG/1
1 CAPSULE ORAL DAILY
Qty: 90 CAPSULE | Refills: 3 | Status: SHIPPED | OUTPATIENT
Start: 2021-10-20 | End: 2022-10-11

## 2021-10-22 ENCOUNTER — OFFICE VISIT (OUTPATIENT)
Dept: PAIN MEDICINE | Facility: CLINIC | Age: 76
End: 2021-10-22
Payer: MEDICARE

## 2021-10-22 VITALS
SYSTOLIC BLOOD PRESSURE: 94 MMHG | HEART RATE: 88 BPM | WEIGHT: 170 LBS | BODY MASS INDEX: 27.32 KG/M2 | DIASTOLIC BLOOD PRESSURE: 50 MMHG | HEIGHT: 66 IN

## 2021-10-22 DIAGNOSIS — M96.1 FAILED BACK SYNDROME OF LUMBAR SPINE: Primary | ICD-10-CM

## 2021-10-22 DIAGNOSIS — M54.16 LUMBAR RADICULOPATHY: ICD-10-CM

## 2021-10-22 DIAGNOSIS — M47.896 OTHER SPONDYLOSIS, LUMBAR REGION: ICD-10-CM

## 2021-10-22 DIAGNOSIS — M51.36 DDD (DEGENERATIVE DISC DISEASE), LUMBAR: ICD-10-CM

## 2021-10-22 PROCEDURE — 99999 PR PBB SHADOW E&M-EST. PATIENT-LVL III: CPT | Mod: PBBFAC,,, | Performed by: ANESTHESIOLOGY

## 2021-10-22 PROCEDURE — 99204 OFFICE O/P NEW MOD 45 MIN: CPT | Mod: S$GLB,,, | Performed by: ANESTHESIOLOGY

## 2021-10-22 PROCEDURE — 99999 PR PBB SHADOW E&M-EST. PATIENT-LVL III: ICD-10-PCS | Mod: PBBFAC,,, | Performed by: ANESTHESIOLOGY

## 2021-10-22 PROCEDURE — 80307 DRUG TEST PRSMV CHEM ANLYZR: CPT | Performed by: ANESTHESIOLOGY

## 2021-10-22 PROCEDURE — 99204 PR OFFICE/OUTPT VISIT, NEW, LEVL IV, 45-59 MIN: ICD-10-PCS | Mod: S$GLB,,, | Performed by: ANESTHESIOLOGY

## 2021-10-22 RX ORDER — OXYCODONE AND ACETAMINOPHEN 7.5; 325 MG/1; MG/1
1 TABLET ORAL EVERY 6 HOURS PRN
Qty: 120 TABLET | Refills: 0 | Status: SHIPPED | OUTPATIENT
Start: 2021-10-22 | End: 2021-11-26

## 2021-10-27 LAB
6MAM UR QL: NOT DETECTED
7AMINOCLONAZEPAM UR QL: NOT DETECTED
A-OH ALPRAZ UR QL: NOT DETECTED
ALPHA-OH-MIDAZOLAM: NOT DETECTED
ALPRAZ UR QL: NOT DETECTED
AMPHET UR QL SCN: NOT DETECTED
ANNOTATION COMMENT IMP: NORMAL
ANNOTATION COMMENT IMP: NORMAL
BARBITURATES UR QL: NOT DETECTED
BUPRENORPHINE UR QL: NOT DETECTED
BZE UR QL: NOT DETECTED
CARBOXYTHC UR QL: NOT DETECTED
CARISOPRODOL UR QL: NOT DETECTED
CLONAZEPAM UR QL: NOT DETECTED
CODEINE UR QL: NOT DETECTED
CREAT UR-MCNC: 90.8 MG/DL (ref 20–400)
DIAZEPAM UR QL: NOT DETECTED
ETHYL GLUCURONIDE UR QL: NOT DETECTED
FENTANYL UR QL: NOT DETECTED
GABAPENTIN: NOT DETECTED
HYDROCODONE UR QL: PRESENT
HYDROMORPHONE UR QL: PRESENT
LORAZEPAM UR QL: NOT DETECTED
MDA UR QL: NOT DETECTED
MDEA UR QL: NOT DETECTED
MDMA UR QL: NOT DETECTED
ME-PHENIDATE UR QL: NOT DETECTED
METHADONE UR QL: NOT DETECTED
METHAMPHET UR QL: NOT DETECTED
MIDAZOLAM UR QL SCN: NOT DETECTED
MORPHINE UR QL: NOT DETECTED
NALOXONE: NOT DETECTED
NORBUPRENORPHINE UR QL CFM: NOT DETECTED
NORDIAZEPAM UR QL: NOT DETECTED
NORFENTANYL UR QL: NOT DETECTED
NORHYDROCODONE UR QL CFM: PRESENT
NORMEPERIDINE UR QL CFM: NOT DETECTED
NOROXYCODONE UR QL CFM: PRESENT
NOROXYMORPHONE UR QL SCN: PRESENT
OXAZEPAM UR QL: NOT DETECTED
OXYCODONE UR QL: PRESENT
OXYMORPHONE UR QL: PRESENT
PATHOLOGY STUDY: NORMAL
PCP UR QL: NOT DETECTED
PHENTERMINE UR QL: NOT DETECTED
PREGABALIN: PRESENT
SERVICE CMNT-IMP: NORMAL
TAPENTADOL UR QL SCN: NOT DETECTED
TAPENTADOL UR QL SCN: NOT DETECTED
TEMAZEPAM UR QL: NOT DETECTED
TRAMADOL UR QL: NOT DETECTED
ZOLPIDEM METABOLITE: NOT DETECTED
ZOLPIDEM UR QL: NOT DETECTED

## 2021-11-15 ENCOUNTER — TELEPHONE (OUTPATIENT)
Dept: PAIN MEDICINE | Facility: CLINIC | Age: 76
End: 2021-11-15
Payer: MEDICARE

## 2021-11-15 RX ORDER — PREGABALIN 150 MG/1
150 CAPSULE ORAL 3 TIMES DAILY
Qty: 90 CAPSULE | Refills: 0 | OUTPATIENT
Start: 2021-11-15

## 2021-11-19 ENCOUNTER — LAB VISIT (OUTPATIENT)
Dept: LAB | Facility: HOSPITAL | Age: 76
End: 2021-11-19
Attending: ANESTHESIOLOGY
Payer: MEDICARE

## 2021-11-19 ENCOUNTER — OFFICE VISIT (OUTPATIENT)
Dept: PAIN MEDICINE | Facility: CLINIC | Age: 76
End: 2021-11-19
Payer: MEDICARE

## 2021-11-19 VITALS
HEART RATE: 87 BPM | SYSTOLIC BLOOD PRESSURE: 118 MMHG | WEIGHT: 170 LBS | HEIGHT: 66 IN | BODY MASS INDEX: 27.32 KG/M2 | DIASTOLIC BLOOD PRESSURE: 53 MMHG

## 2021-11-19 DIAGNOSIS — M51.36 DDD (DEGENERATIVE DISC DISEASE), LUMBAR: ICD-10-CM

## 2021-11-19 DIAGNOSIS — M96.1 FAILED BACK SYNDROME OF LUMBAR SPINE: Primary | ICD-10-CM

## 2021-11-19 DIAGNOSIS — F11.90 CHRONIC, CONTINUOUS USE OF OPIOIDS: ICD-10-CM

## 2021-11-19 DIAGNOSIS — M47.896 OTHER SPONDYLOSIS, LUMBAR REGION: ICD-10-CM

## 2021-11-19 DIAGNOSIS — G89.29 BILATERAL CHRONIC KNEE PAIN: ICD-10-CM

## 2021-11-19 DIAGNOSIS — M25.562 BILATERAL CHRONIC KNEE PAIN: ICD-10-CM

## 2021-11-19 DIAGNOSIS — M25.561 BILATERAL CHRONIC KNEE PAIN: ICD-10-CM

## 2021-11-19 DIAGNOSIS — M54.16 LUMBAR RADICULOPATHY: ICD-10-CM

## 2021-11-19 PROCEDURE — 99999 PR PBB SHADOW E&M-EST. PATIENT-LVL III: ICD-10-PCS | Mod: PBBFAC,,, | Performed by: ANESTHESIOLOGY

## 2021-11-19 PROCEDURE — 99999 PR PBB SHADOW E&M-EST. PATIENT-LVL III: CPT | Mod: PBBFAC,,, | Performed by: ANESTHESIOLOGY

## 2021-11-19 PROCEDURE — 80307 DRUG TEST PRSMV CHEM ANLYZR: CPT | Mod: 59,GZ | Performed by: ANESTHESIOLOGY

## 2021-11-19 PROCEDURE — 80307 DRUG TEST PRSMV CHEM ANLYZR: CPT | Mod: 59 | Performed by: ANESTHESIOLOGY

## 2021-11-19 PROCEDURE — 20610 LARGE JOINT ASPIRATION/INJECTION: BILATERAL KNEE: ICD-10-PCS | Mod: 50,S$GLB,, | Performed by: ANESTHESIOLOGY

## 2021-11-19 PROCEDURE — 20610 DRAIN/INJ JOINT/BURSA W/O US: CPT | Mod: 50,S$GLB,, | Performed by: ANESTHESIOLOGY

## 2021-11-19 PROCEDURE — 99214 PR OFFICE/OUTPT VISIT, EST, LEVL IV, 30-39 MIN: ICD-10-PCS | Mod: 25,S$GLB,, | Performed by: ANESTHESIOLOGY

## 2021-11-19 PROCEDURE — 99214 OFFICE O/P EST MOD 30 MIN: CPT | Mod: 25,S$GLB,, | Performed by: ANESTHESIOLOGY

## 2021-11-19 RX ORDER — METHYLPREDNISOLONE ACETATE 40 MG/ML
40 INJECTION, SUSPENSION INTRA-ARTICULAR; INTRALESIONAL; INTRAMUSCULAR; SOFT TISSUE
Status: DISCONTINUED | OUTPATIENT
Start: 2021-11-19 | End: 2021-11-19 | Stop reason: HOSPADM

## 2021-11-19 RX ORDER — OXYCODONE AND ACETAMINOPHEN 10; 325 MG/1; MG/1
1 TABLET ORAL EVERY 6 HOURS PRN
Qty: 30 TABLET | Refills: 0 | Status: SHIPPED | OUTPATIENT
Start: 2021-11-19 | End: 2021-11-26

## 2021-11-19 RX ADMIN — METHYLPREDNISOLONE ACETATE 40 MG: 40 INJECTION, SUSPENSION INTRA-ARTICULAR; INTRALESIONAL; INTRAMUSCULAR; SOFT TISSUE at 12:11

## 2021-11-24 LAB
AMPHETAMINES SERPL QL: NEGATIVE
BARBITURATES SERPL QL SCN: NEGATIVE
BENZODIAZ SERPL QL SCN: NEGATIVE
BZE SERPL QL: NEGATIVE
CARBOXYTHC SERPL QL SCN: NEGATIVE
DRUG SCREEN, OTC, SERUM - CHAIN OF CUSTODY: NORMAL
DRUGS SERPL SCN: NORMAL
ETHANOL SERPL QL SCN: NEGATIVE
METHADONE SERPL QL SCN: NEGATIVE
OPIATES SERPL QL SCN: NEGATIVE
PCP SERPL QL SCN: NEGATIVE
PROPOXYPH SERPL QL: NEGATIVE

## 2021-11-26 ENCOUNTER — OFFICE VISIT (OUTPATIENT)
Dept: PAIN MEDICINE | Facility: CLINIC | Age: 76
End: 2021-11-26
Payer: MEDICARE

## 2021-11-26 VITALS
HEIGHT: 66 IN | HEART RATE: 92 BPM | DIASTOLIC BLOOD PRESSURE: 72 MMHG | SYSTOLIC BLOOD PRESSURE: 125 MMHG | BODY MASS INDEX: 27.32 KG/M2 | WEIGHT: 170 LBS

## 2021-11-26 DIAGNOSIS — F11.90 CHRONIC, CONTINUOUS USE OF OPIOIDS: ICD-10-CM

## 2021-11-26 DIAGNOSIS — M47.896 OTHER SPONDYLOSIS, LUMBAR REGION: ICD-10-CM

## 2021-11-26 DIAGNOSIS — M96.1 FAILED BACK SYNDROME OF LUMBAR SPINE: Primary | ICD-10-CM

## 2021-11-26 DIAGNOSIS — M51.36 DDD (DEGENERATIVE DISC DISEASE), LUMBAR: ICD-10-CM

## 2021-11-26 DIAGNOSIS — M54.16 LUMBAR RADICULOPATHY: ICD-10-CM

## 2021-11-26 PROCEDURE — 99213 OFFICE O/P EST LOW 20 MIN: CPT | Mod: S$GLB,,, | Performed by: ANESTHESIOLOGY

## 2021-11-26 PROCEDURE — 99213 PR OFFICE/OUTPT VISIT, EST, LEVL III, 20-29 MIN: ICD-10-PCS | Mod: S$GLB,,, | Performed by: ANESTHESIOLOGY

## 2021-11-26 PROCEDURE — 99999 PR PBB SHADOW E&M-EST. PATIENT-LVL III: ICD-10-PCS | Mod: PBBFAC,,, | Performed by: ANESTHESIOLOGY

## 2021-11-26 PROCEDURE — 99999 PR PBB SHADOW E&M-EST. PATIENT-LVL III: CPT | Mod: PBBFAC,,, | Performed by: ANESTHESIOLOGY

## 2021-11-26 PROCEDURE — 80307 DRUG TEST PRSMV CHEM ANLYZR: CPT | Performed by: ANESTHESIOLOGY

## 2021-11-26 RX ORDER — PREGABALIN 200 MG/1
200 CAPSULE ORAL 3 TIMES DAILY
Qty: 90 CAPSULE | Refills: 2 | Status: SHIPPED | OUTPATIENT
Start: 2021-11-26 | End: 2022-02-17 | Stop reason: SDUPTHER

## 2021-11-26 RX ORDER — OXYCODONE AND ACETAMINOPHEN 10; 325 MG/1; MG/1
1 TABLET ORAL EVERY 6 HOURS PRN
Qty: 120 TABLET | Refills: 0 | Status: SHIPPED | OUTPATIENT
Start: 2021-12-17 | End: 2022-01-07 | Stop reason: SDUPTHER

## 2021-11-26 RX ORDER — OXYCODONE AND ACETAMINOPHEN 10; 325 MG/1; MG/1
1 TABLET ORAL EVERY 6 HOURS PRN
Qty: 120 TABLET | Refills: 0 | Status: SHIPPED | OUTPATIENT
Start: 2021-12-17 | End: 2021-11-26

## 2021-12-02 LAB
6MAM UR QL: NOT DETECTED
7AMINOCLONAZEPAM UR QL: NOT DETECTED
A-OH ALPRAZ UR QL: NOT DETECTED
ALPHA-OH-MIDAZOLAM: NOT DETECTED
ALPRAZ UR QL: NOT DETECTED
AMPHET UR QL SCN: NOT DETECTED
ANNOTATION COMMENT IMP: NORMAL
ANNOTATION COMMENT IMP: NORMAL
BARBITURATES UR QL: NOT DETECTED
BUPRENORPHINE UR QL: NOT DETECTED
BZE UR QL: NOT DETECTED
CARBOXYTHC UR QL: NOT DETECTED
CARISOPRODOL UR QL: NOT DETECTED
CLONAZEPAM UR QL: NOT DETECTED
CODEINE UR QL: NOT DETECTED
CREAT UR-MCNC: 96.1 MG/DL (ref 20–400)
DIAZEPAM UR QL: NOT DETECTED
ETHYL GLUCURONIDE UR QL: NOT DETECTED
FENTANYL UR QL: NOT DETECTED
GABAPENTIN: NOT DETECTED
HYDROCODONE UR QL: NOT DETECTED
HYDROMORPHONE UR QL: NOT DETECTED
LORAZEPAM UR QL: NOT DETECTED
MDA UR QL: NOT DETECTED
MDEA UR QL: NOT DETECTED
MDMA UR QL: NOT DETECTED
ME-PHENIDATE UR QL: NOT DETECTED
METHADONE UR QL: NOT DETECTED
METHAMPHET UR QL: NOT DETECTED
MIDAZOLAM UR QL SCN: NOT DETECTED
MORPHINE UR QL: NOT DETECTED
NALOXONE: NOT DETECTED
NORBUPRENORPHINE UR QL CFM: NOT DETECTED
NORDIAZEPAM UR QL: NOT DETECTED
NORFENTANYL UR QL: NOT DETECTED
NORHYDROCODONE UR QL CFM: NOT DETECTED
NORMEPERIDINE UR QL CFM: NOT DETECTED
NOROXYCODONE UR QL CFM: NOT DETECTED
NOROXYMORPHONE UR QL SCN: NOT DETECTED
OXAZEPAM UR QL: NOT DETECTED
OXYCODONE UR QL: NOT DETECTED
OXYMORPHONE UR QL: NOT DETECTED
PATHOLOGY STUDY: NORMAL
PCP UR QL: NOT DETECTED
PHENTERMINE UR QL: NOT DETECTED
PREGABALIN: NOT DETECTED
SERVICE CMNT-IMP: NORMAL
TAPENTADOL UR QL SCN: NOT DETECTED
TAPENTADOL UR QL SCN: NOT DETECTED
TEMAZEPAM UR QL: NOT DETECTED
TRAMADOL UR QL: NOT DETECTED
ZOLPIDEM METABOLITE: NOT DETECTED
ZOLPIDEM UR QL: NOT DETECTED

## 2021-12-17 ENCOUNTER — TELEPHONE (OUTPATIENT)
Dept: PAIN MEDICINE | Facility: CLINIC | Age: 76
End: 2021-12-17
Payer: MEDICARE

## 2022-01-07 ENCOUNTER — OFFICE VISIT (OUTPATIENT)
Dept: PAIN MEDICINE | Facility: CLINIC | Age: 77
End: 2022-01-07
Payer: MEDICARE

## 2022-01-07 VITALS
DIASTOLIC BLOOD PRESSURE: 55 MMHG | HEART RATE: 92 BPM | SYSTOLIC BLOOD PRESSURE: 103 MMHG | HEIGHT: 66 IN | WEIGHT: 170 LBS | BODY MASS INDEX: 27.32 KG/M2

## 2022-01-07 DIAGNOSIS — M51.36 DDD (DEGENERATIVE DISC DISEASE), LUMBAR: ICD-10-CM

## 2022-01-07 DIAGNOSIS — M54.16 LUMBAR RADICULOPATHY: Primary | ICD-10-CM

## 2022-01-07 DIAGNOSIS — M96.1 FAILED BACK SYNDROME OF LUMBAR SPINE: ICD-10-CM

## 2022-01-07 DIAGNOSIS — Z01.818 PRE-OP TESTING: ICD-10-CM

## 2022-01-07 DIAGNOSIS — M47.896 OTHER SPONDYLOSIS, LUMBAR REGION: ICD-10-CM

## 2022-01-07 DIAGNOSIS — F11.90 CHRONIC, CONTINUOUS USE OF OPIOIDS: ICD-10-CM

## 2022-01-07 PROCEDURE — 99999 PR PBB SHADOW E&M-EST. PATIENT-LVL III: CPT | Mod: PBBFAC,,, | Performed by: ANESTHESIOLOGY

## 2022-01-07 PROCEDURE — 99214 OFFICE O/P EST MOD 30 MIN: CPT | Mod: S$GLB,,, | Performed by: ANESTHESIOLOGY

## 2022-01-07 PROCEDURE — 99214 PR OFFICE/OUTPT VISIT, EST, LEVL IV, 30-39 MIN: ICD-10-PCS | Mod: S$GLB,,, | Performed by: ANESTHESIOLOGY

## 2022-01-07 PROCEDURE — 80307 DRUG TEST PRSMV CHEM ANLYZR: CPT | Performed by: ANESTHESIOLOGY

## 2022-01-07 PROCEDURE — 99999 PR PBB SHADOW E&M-EST. PATIENT-LVL III: ICD-10-PCS | Mod: PBBFAC,,, | Performed by: ANESTHESIOLOGY

## 2022-01-07 RX ORDER — OXYCODONE AND ACETAMINOPHEN 10; 325 MG/1; MG/1
1 TABLET ORAL EVERY 6 HOURS PRN
Qty: 120 TABLET | Refills: 0 | Status: SHIPPED | OUTPATIENT
Start: 2022-01-14 | End: 2022-06-21

## 2022-01-07 NOTE — H&P (VIEW-ONLY)
"FOLLOW UP NOTE:     CHIEF COMPLAINT: back and leg pain    INITIAL HISTORY OF PRESENT ILLNESS: Froilan Ray is a 75 y.o. female with PMH significant for hx of SCS implantation (Dr. Noel; 2013) with subsequent explantation, hx of ORIF of left intertrochanteric fracture (Dr. De La Cruz; 2018), hx of seizure disorder (well controlled), and HTN presents for the evaluation of low back pain. 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 The patient reports that she saw Dr. Parr in the past. The patient reports that she has been treated by Dr. Bhatti for the past 5 years. The patient presents to re-establish care with a new pain management provider today. In regards to her pain, the patient localizes her pain to the area across her lower back. The patient reports of radiation down her BLE's to her feet. The patient describes her pain as a burning type of pain. The patient reports of numbness in her legs. The patient reports that her pain is a 10/10 at its worst. Patient denies of any urinary/fecal incontinence, saddle anesthesia. The patient reports of subjective weakness in her legs.      Aggravating factors: bending, lifting items     Mitigating factors: lyrica (benefit in regards to burning leg pain), pain medication     Relevant Surgeries: yes; cervical spine X 1 and lumbar spine surgery X 1     Interventional Therapies: yes; prior SCS implantation and removal. Last saw Dr. Bhatti over the last 5 years. Patient reports that she was "dismissed" from Dr. Bhatti's practice. She reports that she was dismissed secondary to being unable to present for a scheduled UDS. One injection in her back and knee injections - reports of some benefit in regards to her knee pain. Denies of prior RFA.    INTERVAL HISTORY OF PRESENT ILLNESS: Froilan Rya is a 76 y.o. female with PMH significant for hx of SCS " implantation (Dr. Noel; 2013) with subsequent explantation, hx of ORIF of left intertrochanteric fracture (Dr. De La Cruz; 2018), hx of seizure disorder (well controlled), and HTN presents as an established patient for the continued management of back pain. In the interim, the patient reports of benefit with her previous knee injections. Today, the patient localizes her worst pain to the area across her lower back. The patient reports of radiation down her LLE to her foot. She describes this as a burning pain in her foot. She reports of worsening pain with physical activity in general. She reports of benefit on her current pain regimen as outlined below. The patient denies of any significant changes in her health since her last appointment. The patient also denies of any changes in the character of her pain since her last appointment. The patient reports that her current pain is a 8/10. Patient denies of any urinary/fecal incontinence, saddle anesthesia, or new weakness.     INTERVENTIONAL PAIN HISTORY:  11/19/2021: Bilateral knee injections      CURRENT PAIN MEDICATIONS:   Lyrica 200 mg PO TID  OTC aleve  flexeril 10 mg PO TID  Percocet  mg PO QID      ROS:  Review of Systems   Constitutional: Negative for chills and fever.   HENT: Negative for sore throat.    Eyes: Negative for visual disturbance.   Respiratory: Negative for shortness of breath.    Cardiovascular: Negative for chest pain.   Gastrointestinal: Negative for nausea and vomiting.   Genitourinary: Negative for difficulty urinating.   Musculoskeletal: Positive for arthralgias, back pain, gait problem and neck pain.   Skin: Negative for rash.   Allergic/Immunologic: Negative for immunocompromised state.   Neurological: Positive for weakness and numbness. Negative for syncope.   Hematological: Does not bruise/bleed easily.   Psychiatric/Behavioral: Negative for suicidal ideas.        MEDICAL, SURGICAL, FAMILY, SOCIAL HX:  "reviewed    MEDICATIONS/ALLERGIES: reviewed    PHYSICAL EXAM:    VITALS: Vitals reviewed.   Vitals:    01/07/22 1150   BP: (!) 103/55   Pulse: 92   Weight: 77.1 kg (170 lb)   Height: 5' 6" (1.676 m)   PainSc:   8   PainLoc: Back       Physical Exam  Vitals and nursing note reviewed.   Constitutional:       Appearance: She is not diaphoretic.   HENT:      Head: Normocephalic and atraumatic.   Eyes:      General:         Right eye: No discharge.         Left eye: No discharge.      Extraocular Movements: EOM normal.      Conjunctiva/sclera: Conjunctivae normal.   Cardiovascular:      Rate and Rhythm: Normal rate.   Pulmonary:      Effort: Pulmonary effort is normal. No respiratory distress.      Breath sounds: Normal breath sounds.   Abdominal:      Palpations: Abdomen is soft.   Skin:     General: Skin is warm and dry.      Findings: No rash.   Neurological:      Mental Status: She is alert and oriented to person, place, and time.   Psychiatric:         Mood and Affect: Mood and affect normal.         Cognition and Memory: Memory normal.         Judgment: Judgment normal.        UPPER EXTREMITIES: Normal alignment, normal range of motion, no atrophy, no skin changes,  hair growth and nail growth normal and equal bilaterally. No swelling, no tenderness.    LOWER EXTREMITIES:  Normal alignment, normal range of motion, no atrophy, no skin changes,  hair growth and nail growth normal and equal bilaterally. No swelling, no tenderness.     LUMBAR SPINE; patient with severe kyphosis of the lumbar spine  Lumbar spine: ROM is limited with flexion extension and oblique extension with increased pain with passive ROM.    ((+)) Facet loading   Myofascial exam: Tenderness to palpation across lumbar paraspinous muscles. Prior midline scar is well-healed.      MOTOR: Tone and bulk: normal bilateral upper and lower Strength: normal "   Delt      Bi         Tri        WE      WF                        R          5          5          5          5          5          5            L          5          5          5          5          5          5               IP         ADD     ABD     Quad   TA        Gas      HAM  R          5          5          5          5          5          5          5  L          5          5          5          5          5          5          5     SENSATION: Decreased sensation in the lower extremities to light touch  REFLEXES: normal, symmetric, nonbrisk.  Toes down, no clonus. Negative dooley's sign bilaterally.  GAIT: antalgic gait; uses a single point cane for assistance with ambulation    IMAGING: no new imaging to review    ASSESSMENT: Froilan Ray is a 76 y.o. female with PMH significant for hx of SCS implantation (Dr. Noel; 2013) with subsequent explantation, hx of ORIF of left intertrochanteric fracture (Dr. De La Cruz; 2018), hx of seizure disorder (well controlled), and HTN presents as an established patient for the continued management of back pain. Today, the patient localizes her worst pain to the area across her lower back with radiation down her LLE to her foot. She reports of benefit on her current pain regimen. Treatment plan outlined below.     PLAN:  1. Schedule for caudal epidural steroid injection  2. Refilled Percocet  mg PO q 6 hr PRN for breakthrough pain; # 120 tablets; 0 refills.  reviewed without discrepancies.    3. Continue lyrica 200 mg PO TID for neuropathic pain  4. UDS to be collected today. The patient reports that she last had pain medications this AM.   5. I have stressed the importance of physical activity and a home exercise plan to help with chronic pain and improve health.  6. RTC for the procedure as outlined above     Luzmaria Marcelino MD  Pain Management

## 2022-01-07 NOTE — PROGRESS NOTES
"FOLLOW UP NOTE:     CHIEF COMPLAINT: back and leg pain    INITIAL HISTORY OF PRESENT ILLNESS: Froilan Ray is a 75 y.o. female with PMH significant for hx of SCS implantation (Dr. Noel; 2013) with subsequent explantation, hx of ORIF of left intertrochanteric fracture (Dr. De La Cruz; 2018), hx of seizure disorder (well controlled), and HTN presents for the evaluation of low back pain. 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 The patient reports that she saw Dr. Parr in the past. The patient reports that she has been treated by Dr. Bhatti for the past 5 years. The patient presents to re-establish care with a new pain management provider today. In regards to her pain, the patient localizes her pain to the area across her lower back. The patient reports of radiation down her BLE's to her feet. The patient describes her pain as a burning type of pain. The patient reports of numbness in her legs. The patient reports that her pain is a 10/10 at its worst. Patient denies of any urinary/fecal incontinence, saddle anesthesia. The patient reports of subjective weakness in her legs.      Aggravating factors: bending, lifting items     Mitigating factors: lyrica (benefit in regards to burning leg pain), pain medication     Relevant Surgeries: yes; cervical spine X 1 and lumbar spine surgery X 1     Interventional Therapies: yes; prior SCS implantation and removal. Last saw Dr. Bhatti over the last 5 years. Patient reports that she was "dismissed" from Dr. Bhatti's practice. She reports that she was dismissed secondary to being unable to present for a scheduled UDS. One injection in her back and knee injections - reports of some benefit in regards to her knee pain. Denies of prior RFA.    INTERVAL HISTORY OF PRESENT ILLNESS: Friolan Ray is a 76 y.o. female with PMH significant for hx of SCS " implantation (Dr. Noel; 2013) with subsequent explantation, hx of ORIF of left intertrochanteric fracture (Dr. De La Cruz; 2018), hx of seizure disorder (well controlled), and HTN presents as an established patient for the continued management of back pain. In the interim, the patient reports of benefit with her previous knee injections. Today, the patient localizes her worst pain to the area across her lower back. The patient reports of radiation down her LLE to her foot. She describes this as a burning pain in her foot. She reports of worsening pain with physical activity in general. She reports of benefit on her current pain regimen as outlined below. The patient denies of any significant changes in her health since her last appointment. The patient also denies of any changes in the character of her pain since her last appointment. The patient reports that her current pain is a 8/10. Patient denies of any urinary/fecal incontinence, saddle anesthesia, or new weakness.     INTERVENTIONAL PAIN HISTORY:  11/19/2021: Bilateral knee injections      CURRENT PAIN MEDICATIONS:   Lyrica 200 mg PO TID  OTC aleve  flexeril 10 mg PO TID  Percocet  mg PO QID      ROS:  Review of Systems   Constitutional: Negative for chills and fever.   HENT: Negative for sore throat.    Eyes: Negative for visual disturbance.   Respiratory: Negative for shortness of breath.    Cardiovascular: Negative for chest pain.   Gastrointestinal: Negative for nausea and vomiting.   Genitourinary: Negative for difficulty urinating.   Musculoskeletal: Positive for arthralgias, back pain, gait problem and neck pain.   Skin: Negative for rash.   Allergic/Immunologic: Negative for immunocompromised state.   Neurological: Positive for weakness and numbness. Negative for syncope.   Hematological: Does not bruise/bleed easily.   Psychiatric/Behavioral: Negative for suicidal ideas.        MEDICAL, SURGICAL, FAMILY, SOCIAL HX:  "reviewed    MEDICATIONS/ALLERGIES: reviewed    PHYSICAL EXAM:    VITALS: Vitals reviewed.   Vitals:    01/07/22 1150   BP: (!) 103/55   Pulse: 92   Weight: 77.1 kg (170 lb)   Height: 5' 6" (1.676 m)   PainSc:   8   PainLoc: Back       Physical Exam  Vitals and nursing note reviewed.   Constitutional:       Appearance: She is not diaphoretic.   HENT:      Head: Normocephalic and atraumatic.   Eyes:      General:         Right eye: No discharge.         Left eye: No discharge.      Extraocular Movements: EOM normal.      Conjunctiva/sclera: Conjunctivae normal.   Cardiovascular:      Rate and Rhythm: Normal rate.   Pulmonary:      Effort: Pulmonary effort is normal. No respiratory distress.      Breath sounds: Normal breath sounds.   Abdominal:      Palpations: Abdomen is soft.   Skin:     General: Skin is warm and dry.      Findings: No rash.   Neurological:      Mental Status: She is alert and oriented to person, place, and time.   Psychiatric:         Mood and Affect: Mood and affect normal.         Cognition and Memory: Memory normal.         Judgment: Judgment normal.        UPPER EXTREMITIES: Normal alignment, normal range of motion, no atrophy, no skin changes,  hair growth and nail growth normal and equal bilaterally. No swelling, no tenderness.    LOWER EXTREMITIES:  Normal alignment, normal range of motion, no atrophy, no skin changes,  hair growth and nail growth normal and equal bilaterally. No swelling, no tenderness.     LUMBAR SPINE; patient with severe kyphosis of the lumbar spine  Lumbar spine: ROM is limited with flexion extension and oblique extension with increased pain with passive ROM.    ((+)) Facet loading   Myofascial exam: Tenderness to palpation across lumbar paraspinous muscles. Prior midline scar is well-healed.      MOTOR: Tone and bulk: normal bilateral upper and lower Strength: normal "   Delt      Bi         Tri        WE      WF                        R          5          5          5          5          5          5            L          5          5          5          5          5          5               IP         ADD     ABD     Quad   TA        Gas      HAM  R          5          5          5          5          5          5          5  L          5          5          5          5          5          5          5     SENSATION: Decreased sensation in the lower extremities to light touch  REFLEXES: normal, symmetric, nonbrisk.  Toes down, no clonus. Negative dooley's sign bilaterally.  GAIT: antalgic gait; uses a single point cane for assistance with ambulation    IMAGING: no new imaging to review    ASSESSMENT: Froilan Ray is a 76 y.o. female with PMH significant for hx of SCS implantation (Dr. Noel; 2013) with subsequent explantation, hx of ORIF of left intertrochanteric fracture (Dr. De La Cruz; 2018), hx of seizure disorder (well controlled), and HTN presents as an established patient for the continued management of back pain. Today, the patient localizes her worst pain to the area across her lower back with radiation down her LLE to her foot. She reports of benefit on her current pain regimen. Treatment plan outlined below.     PLAN:  1. Schedule for caudal epidural steroid injection  2. Refilled Percocet  mg PO q 6 hr PRN for breakthrough pain; # 120 tablets; 0 refills.  reviewed without discrepancies.    3. Continue lyrica 200 mg PO TID for neuropathic pain  4. UDS to be collected today. The patient reports that she last had pain medications this AM.   5. I have stressed the importance of physical activity and a home exercise plan to help with chronic pain and improve health.  6. RTC for the procedure as outlined above     Luzmaria Marcelino MD  Pain Management

## 2022-01-13 ENCOUNTER — TELEPHONE (OUTPATIENT)
Dept: FAMILY MEDICINE | Facility: CLINIC | Age: 77
End: 2022-01-13
Payer: MEDICARE

## 2022-01-13 ENCOUNTER — HOSPITAL ENCOUNTER (INPATIENT)
Facility: HOSPITAL | Age: 77
LOS: 1 days | Discharge: HOME | DRG: 379 | End: 2022-01-15
Attending: EMERGENCY MEDICINE | Admitting: INTERNAL MEDICINE
Payer: MEDICARE

## 2022-01-13 DIAGNOSIS — K27.4: ICD-10-CM

## 2022-01-13 DIAGNOSIS — D64.9 SEVERE ANEMIA: ICD-10-CM

## 2022-01-13 LAB
6MAM UR QL: NOT DETECTED
7AMINOCLONAZEPAM UR QL: NOT DETECTED
A-OH ALPRAZ UR QL: NOT DETECTED
ABO + RH BLD: NORMAL
ALBUMIN SERPL BCP-MCNC: 3.4 G/DL (ref 3.5–5.2)
ALP SERPL-CCNC: 60 U/L (ref 55–135)
ALPHA-OH-MIDAZOLAM: NOT DETECTED
ALPRAZ UR QL: NOT DETECTED
ALT SERPL W/O P-5'-P-CCNC: 9 U/L (ref 10–44)
AMPHET UR QL SCN: NOT DETECTED
ANION GAP SERPL CALC-SCNC: 10 MMOL/L (ref 8–16)
ANNOTATION COMMENT IMP: NORMAL
ANNOTATION COMMENT IMP: NORMAL
AST SERPL-CCNC: 12 U/L (ref 10–40)
BARBITURATES UR QL: NOT DETECTED
BASOPHILS # BLD AUTO: 0.04 K/UL (ref 0–0.2)
BASOPHILS NFR BLD: 0.8 % (ref 0–1.9)
BILIRUB SERPL-MCNC: 0.3 MG/DL (ref 0.1–1)
BLD GP AB SCN CELLS X3 SERPL QL: NORMAL
BLD PROD TYP BPU: NORMAL
BLD PROD TYP BPU: NORMAL
BLOOD UNIT EXPIRATION DATE: NORMAL
BLOOD UNIT EXPIRATION DATE: NORMAL
BLOOD UNIT TYPE CODE: 7300
BLOOD UNIT TYPE CODE: 7300
BLOOD UNIT TYPE: NORMAL
BLOOD UNIT TYPE: NORMAL
BUN SERPL-MCNC: 19 MG/DL (ref 8–23)
BUPRENORPHINE UR QL: NOT DETECTED
BZE UR QL: NOT DETECTED
CALCIUM SERPL-MCNC: 8.7 MG/DL (ref 8.7–10.5)
CARBOXYTHC UR QL: NOT DETECTED
CARISOPRODOL UR QL: NOT DETECTED
CHLORIDE SERPL-SCNC: 109 MMOL/L (ref 95–110)
CLONAZEPAM UR QL: NOT DETECTED
CO2 SERPL-SCNC: 22 MMOL/L (ref 23–29)
CODEINE UR QL: NOT DETECTED
CODING SYSTEM: NORMAL
CODING SYSTEM: NORMAL
CREAT SERPL-MCNC: 0.8 MG/DL (ref 0.5–1.4)
CREAT UR-MCNC: 43.9 MG/DL (ref 20–400)
DIAZEPAM UR QL: NOT DETECTED
DIFFERENTIAL METHOD: ABNORMAL
DISPENSE STATUS: NORMAL
DISPENSE STATUS: NORMAL
EOSINOPHIL # BLD AUTO: 0.2 K/UL (ref 0–0.5)
EOSINOPHIL NFR BLD: 4.2 % (ref 0–8)
ERYTHROCYTE [DISTWIDTH] IN BLOOD BY AUTOMATED COUNT: 15.3 % (ref 11.5–14.5)
EST. GFR  (AFRICAN AMERICAN): >60 ML/MIN/1.73 M^2
EST. GFR  (NON AFRICAN AMERICAN): >60 ML/MIN/1.73 M^2
ETHYL GLUCURONIDE UR QL: NOT DETECTED
FENTANYL UR QL: NOT DETECTED
FOLATE SERPL-MCNC: 10.7 NG/ML (ref 4–24)
GABAPENTIN: NOT DETECTED
GLUCOSE SERPL-MCNC: 104 MG/DL (ref 70–110)
HCT VFR BLD AUTO: 18.4 % (ref 37–48.5)
HGB BLD-MCNC: 4.8 G/DL (ref 12–16)
HYDROCODONE UR QL: NOT DETECTED
HYDROMORPHONE UR QL: NOT DETECTED
IMM GRANULOCYTES # BLD AUTO: 0.01 K/UL (ref 0–0.04)
IMM GRANULOCYTES NFR BLD AUTO: 0.2 % (ref 0–0.5)
LORAZEPAM UR QL: NOT DETECTED
LYMPHOCYTES # BLD AUTO: 1.3 K/UL (ref 1–4.8)
LYMPHOCYTES NFR BLD: 26.6 % (ref 18–48)
MCH RBC QN AUTO: 18.8 PG (ref 27–31)
MCHC RBC AUTO-ENTMCNC: 26.1 G/DL (ref 32–36)
MCV RBC AUTO: 72 FL (ref 82–98)
MDA UR QL: NOT DETECTED
MDEA UR QL: NOT DETECTED
MDMA UR QL: NOT DETECTED
ME-PHENIDATE UR QL: NOT DETECTED
METHADONE UR QL: NOT DETECTED
METHAMPHET UR QL: NOT DETECTED
MIDAZOLAM UR QL SCN: NOT DETECTED
MONOCYTES # BLD AUTO: 0.3 K/UL (ref 0.3–1)
MONOCYTES NFR BLD: 6.8 % (ref 4–15)
MORPHINE UR QL: NOT DETECTED
NALOXONE: NOT DETECTED
NEUTROPHILS # BLD AUTO: 3.1 K/UL (ref 1.8–7.7)
NEUTROPHILS NFR BLD: 61.4 % (ref 38–73)
NORBUPRENORPHINE UR QL CFM: NOT DETECTED
NORDIAZEPAM UR QL: NOT DETECTED
NORFENTANYL UR QL: NOT DETECTED
NORHYDROCODONE UR QL CFM: NOT DETECTED
NORMEPERIDINE UR QL CFM: NOT DETECTED
NOROXYCODONE UR QL CFM: PRESENT
NOROXYMORPHONE UR QL SCN: PRESENT
NRBC BLD-RTO: 0 /100 WBC
NUM UNITS TRANS PACKED RBC: NORMAL
NUM UNITS TRANS PACKED RBC: NORMAL
OXAZEPAM UR QL: NOT DETECTED
OXYCODONE UR QL: PRESENT
OXYMORPHONE UR QL: PRESENT
PATHOLOGY STUDY: NORMAL
PCP UR QL: NOT DETECTED
PHENTERMINE UR QL: NOT DETECTED
PLATELET # BLD AUTO: 249 K/UL (ref 150–450)
PMV BLD AUTO: 9.8 FL (ref 9.2–12.9)
POTASSIUM SERPL-SCNC: 3.8 MMOL/L (ref 3.5–5.1)
PREGABALIN: PRESENT
PROT SERPL-MCNC: 6 G/DL (ref 6–8.4)
RBC # BLD AUTO: 2.56 M/UL (ref 4–5.4)
SARS-COV-2 RDRP RESP QL NAA+PROBE: NEGATIVE
SERVICE CMNT-IMP: NORMAL
SODIUM SERPL-SCNC: 141 MMOL/L (ref 136–145)
TAPENTADOL UR QL SCN: NOT DETECTED
TAPENTADOL UR QL SCN: NOT DETECTED
TEMAZEPAM UR QL: NOT DETECTED
TRAMADOL UR QL: NOT DETECTED
VIT B12 SERPL-MCNC: 182 PG/ML (ref 210–950)
WBC # BLD AUTO: 4.97 K/UL (ref 3.9–12.7)
ZOLPIDEM METABOLITE: NOT DETECTED
ZOLPIDEM UR QL: NOT DETECTED

## 2022-01-13 PROCEDURE — 84466 ASSAY OF TRANSFERRIN: CPT | Performed by: NURSE PRACTITIONER

## 2022-01-13 PROCEDURE — U0002 COVID-19 LAB TEST NON-CDC: HCPCS | Performed by: EMERGENCY MEDICINE

## 2022-01-13 PROCEDURE — 80053 COMPREHEN METABOLIC PANEL: CPT | Performed by: EMERGENCY MEDICINE

## 2022-01-13 PROCEDURE — P9016 RBC LEUKOCYTES REDUCED: HCPCS | Performed by: EMERGENCY MEDICINE

## 2022-01-13 PROCEDURE — 85025 COMPLETE CBC W/AUTO DIFF WBC: CPT | Performed by: EMERGENCY MEDICINE

## 2022-01-13 PROCEDURE — 63600175 PHARM REV CODE 636 W HCPCS: Performed by: NURSE PRACTITIONER

## 2022-01-13 PROCEDURE — 86901 BLOOD TYPING SEROLOGIC RH(D): CPT | Performed by: EMERGENCY MEDICINE

## 2022-01-13 PROCEDURE — 82746 ASSAY OF FOLIC ACID SERUM: CPT | Performed by: NURSE PRACTITIONER

## 2022-01-13 PROCEDURE — 86920 COMPATIBILITY TEST SPIN: CPT | Performed by: EMERGENCY MEDICINE

## 2022-01-13 PROCEDURE — 25000003 PHARM REV CODE 250: Performed by: NURSE PRACTITIONER

## 2022-01-13 PROCEDURE — 36430 TRANSFUSION BLD/BLD COMPNT: CPT

## 2022-01-13 PROCEDURE — 99285 EMERGENCY DEPT VISIT HI MDM: CPT | Mod: 25

## 2022-01-13 PROCEDURE — 99900035 HC TECH TIME PER 15 MIN (STAT)

## 2022-01-13 PROCEDURE — 96374 THER/PROPH/DIAG INJ IV PUSH: CPT | Performed by: EMERGENCY MEDICINE

## 2022-01-13 PROCEDURE — G0378 HOSPITAL OBSERVATION PER HR: HCPCS

## 2022-01-13 PROCEDURE — 82607 VITAMIN B-12: CPT | Performed by: NURSE PRACTITIONER

## 2022-01-13 PROCEDURE — 36415 COLL VENOUS BLD VENIPUNCTURE: CPT | Performed by: NURSE PRACTITIONER

## 2022-01-13 RX ORDER — LANOLIN ALCOHOL/MO/W.PET/CERES
800 CREAM (GRAM) TOPICAL
Status: DISCONTINUED | OUTPATIENT
Start: 2022-01-13 | End: 2022-01-15 | Stop reason: HOSPADM

## 2022-01-13 RX ORDER — SODIUM CHLORIDE 0.9 % (FLUSH) 0.9 %
10 SYRINGE (ML) INJECTION EVERY 8 HOURS
Status: DISCONTINUED | OUTPATIENT
Start: 2022-01-13 | End: 2022-01-14

## 2022-01-13 RX ORDER — NALOXONE HCL 0.4 MG/ML
0.02 VIAL (ML) INJECTION
Status: DISCONTINUED | OUTPATIENT
Start: 2022-01-13 | End: 2022-01-15 | Stop reason: HOSPADM

## 2022-01-13 RX ORDER — IBUPROFEN 200 MG
16 TABLET ORAL
Status: DISCONTINUED | OUTPATIENT
Start: 2022-01-13 | End: 2022-01-15 | Stop reason: HOSPADM

## 2022-01-13 RX ORDER — ESCITALOPRAM OXALATE 10 MG/1
20 TABLET ORAL DAILY
Status: DISCONTINUED | OUTPATIENT
Start: 2022-01-14 | End: 2022-01-15 | Stop reason: HOSPADM

## 2022-01-13 RX ORDER — GLUCAGON 1 MG
1 KIT INJECTION
Status: DISCONTINUED | OUTPATIENT
Start: 2022-01-13 | End: 2022-01-15 | Stop reason: HOSPADM

## 2022-01-13 RX ORDER — DIVALPROEX SODIUM 250 MG/1
1000 TABLET, FILM COATED, EXTENDED RELEASE ORAL DAILY
Status: DISCONTINUED | OUTPATIENT
Start: 2022-01-14 | End: 2022-01-14

## 2022-01-13 RX ORDER — CYCLOBENZAPRINE HCL 5 MG
10 TABLET ORAL 3 TIMES DAILY PRN
Status: DISCONTINUED | OUTPATIENT
Start: 2022-01-13 | End: 2022-01-15 | Stop reason: HOSPADM

## 2022-01-13 RX ORDER — IBUPROFEN 200 MG
24 TABLET ORAL
Status: DISCONTINUED | OUTPATIENT
Start: 2022-01-13 | End: 2022-01-15 | Stop reason: HOSPADM

## 2022-01-13 RX ORDER — ACETAMINOPHEN 500 MG
1000 TABLET ORAL EVERY 6 HOURS PRN
Status: DISCONTINUED | OUTPATIENT
Start: 2022-01-13 | End: 2022-01-15 | Stop reason: HOSPADM

## 2022-01-13 RX ORDER — ONDANSETRON 2 MG/ML
4 INJECTION INTRAMUSCULAR; INTRAVENOUS EVERY 6 HOURS PRN
Status: DISCONTINUED | OUTPATIENT
Start: 2022-01-13 | End: 2022-01-15 | Stop reason: HOSPADM

## 2022-01-13 RX ORDER — DIVALPROEX SODIUM 250 MG/1
500 TABLET, FILM COATED, EXTENDED RELEASE ORAL NIGHTLY
Status: DISCONTINUED | OUTPATIENT
Start: 2022-01-13 | End: 2022-01-14

## 2022-01-13 RX ORDER — POLYETHYLENE GLYCOL 3350, SODIUM CHLORIDE, SODIUM BICARBONATE, POTASSIUM CHLORIDE 420; 11.2; 5.72; 1.48 G/4L; G/4L; G/4L; G/4L
4000 POWDER, FOR SOLUTION ORAL ONCE
Status: COMPLETED | OUTPATIENT
Start: 2022-01-13 | End: 2022-01-13

## 2022-01-13 RX ORDER — OXYCODONE AND ACETAMINOPHEN 10; 325 MG/1; MG/1
1 TABLET ORAL EVERY 6 HOURS PRN
Status: DISCONTINUED | OUTPATIENT
Start: 2022-01-13 | End: 2022-01-15 | Stop reason: HOSPADM

## 2022-01-13 RX ORDER — FUROSEMIDE 10 MG/ML
20 INJECTION INTRAMUSCULAR; INTRAVENOUS EVERY 4 HOURS PRN
Status: COMPLETED | OUTPATIENT
Start: 2022-01-13 | End: 2022-01-14

## 2022-01-13 RX ORDER — TALC
9 POWDER (GRAM) TOPICAL NIGHTLY PRN
Status: DISCONTINUED | OUTPATIENT
Start: 2022-01-13 | End: 2022-01-15 | Stop reason: HOSPADM

## 2022-01-13 RX ORDER — MAG HYDROX/ALUMINUM HYD/SIMETH 200-200-20
30 SUSPENSION, ORAL (FINAL DOSE FORM) ORAL 4 TIMES DAILY PRN
Status: DISCONTINUED | OUTPATIENT
Start: 2022-01-13 | End: 2022-01-15 | Stop reason: HOSPADM

## 2022-01-13 RX ORDER — IPRATROPIUM BROMIDE AND ALBUTEROL SULFATE 2.5; .5 MG/3ML; MG/3ML
3 SOLUTION RESPIRATORY (INHALATION) EVERY 4 HOURS PRN
Status: DISCONTINUED | OUTPATIENT
Start: 2022-01-13 | End: 2022-01-15 | Stop reason: HOSPADM

## 2022-01-13 RX ADMIN — FUROSEMIDE 20 MG: 10 INJECTION, SOLUTION INTRAMUSCULAR; INTRAVENOUS at 08:01

## 2022-01-13 RX ADMIN — POLYETHYLENE GLYCOL 3350, SODIUM CHLORIDE, SODIUM BICARBONATE AND POTASSIUM CHLORIDE 4000 ML: 420; 5.72; 11.2; 1.48 POWDER, FOR SOLUTION ORAL at 08:01

## 2022-01-13 RX ADMIN — PREGABALIN 200 MG: 75 CAPSULE ORAL at 08:01

## 2022-01-13 RX ADMIN — OXYCODONE AND ACETAMINOPHEN 1 TABLET: 10; 325 TABLET ORAL at 08:01

## 2022-01-13 NOTE — TELEPHONE ENCOUNTER
Received: Today  MD Caitlyn Sales, LPN  Caller: Unspecified (Today,  1:41 PM)  Got it' working on it w Dr Alejo to see about an outpatient transfusion             Previous Messages

## 2022-01-13 NOTE — H&P
[facility] Medicine  History & Physical    Patient Name: Froilan Ray  MRN: [id number]  Patient Class: OP- Observation  Admission Date: 1/13/2022  Attending Physician: Isaac Valenzuela MD  Primary Care Provider: Quinn Rangel MD         Patient information was obtained from patient, past medical records and ER records.     Subjective:     Principal Problem:Severe anemia    Chief Complaint:   Chief Complaint   Patient presents with    Anemia        HPI: Froilan Ray is a 76-year-old female with PMHx significant for HTN, anemia, chronic pain maintained on opiate therapy, and seizure disorder.  She presented the ED at the recommendation of her for sedation for abnormal labs.  She was found to have profound anemia on her routine labs.  She endorses months fatigue and weakness.  She denies any chest pain, SOB, nausea, vomiting, abdominal pain, black/tarry stool, hematemesis, or juan bloody stool.  She reports Hx of anemia and reports at 1 time she was told she had bleeding ulcers.  She is unable to quantify the time between her last endoscopic evaluation.  She is unsure that she has ever had a colonoscopy due to poor prep in the past when attempted.  She is not actively taking any iron therapy.  She does endorse daily use of Aleve -2 tabs daily.  She denies any further NSAID use.  Her workup in the ED was significant for profound anemia.  She was ordered for blood transfusion.  Case was discussed with GI given significant microcytic anemia concerning for iron deficiency anemia with possible bleeding etiology.  She will prep tonight for endoscopic evaluation tomorrow.  Pt was placed in observation for further evaluation management.  Other pertinent medical Hx as below:      Past Medical History:   Diagnosis Date    Anemia     Arthritis     Bleeding ulcer 07/2016    Chronic pain     DDD (degenerative disc disease), cervical     DDD (degenerative disc disease), lumbar     Depression      Disc degeneration, lumbosacral     Diverticulitis     Encounter for blood transfusion     Hypertension     IBS (irritable bowel syndrome)     Neuropathy of both feet     Seizures     none  since 2017    Umbilical hernia 2020       Past Surgical History:   Procedure Laterality Date    BACK SURGERY      BREAST SURGERY Right     lumpectomy    ENDOSCOPIC ULTRASOUND OF UPPER GASTROINTESTINAL TRACT N/A 7/21/2020    Procedure: ULTRASOUND, UPPER GI TRACT, ENDOSCOPIC;  Surgeon: Marcio Nguyễn III, MD;  Location: [facility];  Service: Endoscopy;  Laterality: N/A;    EYE SURGERY      cataract    HYSTERECTOMY      INTRAMEDULLARY RODDING OF TROCHANTER OF FEMUR Left 12/11/2018    Procedure: INSERTION, INTRAMEDULLARY SANTOS, FEMUR, TROCHANTER;  Surgeon: Eulalio De La Cruz MD;  Location[facility];  Service: Orthopedics;  Laterality: Left;    SPINAL CORD STIMULATOR IMPLANT  09/18/2013    and removal    SPINE SURGERY  2006    lumbar L2-S1 decompression.    SPINE SURGERY      cervical decompression    TONSILLECTOMY         Review of patient's allergies indicates:  No Known Allergies    No current facility-administered medications on file prior to encounter.     Current Outpatient Medications on File Prior to Encounter   Medication Sig    acetaminophen (TYLENOL) 325 MG tablet Take 650 mg by mouth once.    amlodipine-benazepril 5-20 mg (LOTREL) 5-20 mg per capsule Take 1 capsule by mouth once daily.    cyclobenzaprine (FLEXERIL) 10 MG tablet Take 10 mg by mouth 3 (three) times daily.    divalproex ER (DEPAKOTE) 500 MG Tb24 Take 500 mg by mouth. Take 2 tablets every morning and 1 tablet every evening    EScitalopram oxalate (LEXAPRO) 20 MG tablet TAKE 1 TABLET(20 MG) BY MOUTH EVERY DAY    famotidine (PEPCID) 20 MG tablet Take 20 mg by mouth daily as needed for Heartburn.    furosemide (LASIX) 40 MG tablet Take 40 mg by mouth once daily.     metOLazone (ZAROXOLYN) 2.5 MG tablet Take 2.5 mg by mouth. Take 1  tablet twice a week    [START ON 1/14/2022] oxyCODONE-acetaminophen (PERCOCET)  mg per tablet Take 1 tablet by mouth every 6 (six) hours as needed for Pain.    potassium chloride SA (K-DUR,KLOR-CON) 20 MEQ tablet Take 20 mEq by mouth 2 (two) times daily.    pregabalin (LYRICA) 150 MG capsule Take 150 mg by mouth 3 (three) times daily.    pregabalin (LYRICA) 200 MG Cap Take 1 capsule (200 mg total) by mouth 3 (three) times daily.     Family History     Problem Relation (Age of Onset)    COPD Brother    Coronary artery disease Father    Depression Father    Diabetes Mother, Father, Sister, Brother    Heart disease Father    Hypertension Mother, Father    Irritable bowel syndrome Mother        Tobacco Use    Smoking status: Never Smoker    Smokeless tobacco: Never Used   Substance and Sexual Activity    Alcohol use: No    Drug use: No    Sexual activity: Not Currently     Review of Systems   Constitutional: Positive for fatigue. Negative for chills and fever.   HENT: Negative for congestion, postnasal drip and trouble swallowing.    Eyes: Negative for photophobia and visual disturbance.   Respiratory: Negative for cough, shortness of breath and wheezing.    Cardiovascular: Positive for leg swelling. Negative for chest pain and palpitations.   Gastrointestinal: Positive for constipation (chronic). Negative for abdominal distention, abdominal pain, nausea and vomiting.   Genitourinary: Negative for difficulty urinating and dysuria.   Musculoskeletal: Positive for back pain (chronic) and neck pain (chronic).   Skin: Negative for color change.   Neurological: Positive for weakness. Negative for dizziness, light-headedness and headaches.   Psychiatric/Behavioral: Negative for confusion. The patient is not nervous/anxious.      Objective:     Vital Signs (Most Recent):  Temp: 98.1 °F (36.7 °C) (01/13/22 1506)  Pulse: 82 (01/13/22 1506)  Resp: 20 (01/13/22 1506)  BP: (!) 119/58 (01/13/22 1506)  SpO2: 96 %  (01/13/22 1506) Vital Signs (24h Range):  Temp:  [98.1 °F (36.7 °C)] 98.1 °F (36.7 °C)  Pulse:  [82] 82  Resp:  [20] 20  SpO2:  [96 %] 96 %  BP: (119)/(58) 119/58     Weight: 77.1 kg (170 lb)  Body mass index is 27.44 kg/m².    Physical Exam  Vitals and nursing note reviewed.   Constitutional:       Appearance: Normal appearance.   HENT:      Head: Normocephalic and atraumatic.      Mouth/Throat:      Mouth: Mucous membranes are dry.      Pharynx: Oropharynx is clear.   Eyes:      Extraocular Movements: Extraocular movements intact.      Conjunctiva/sclera: Conjunctivae normal.      Pupils: Pupils are equal, round, and reactive to light.   Cardiovascular:      Rate and Rhythm: Normal rate and regular rhythm.   Abdominal:      General: Abdomen is flat. Bowel sounds are normal. There is no distension.      Palpations: Abdomen is soft.      Tenderness: There is no abdominal tenderness.      Comments: Reducible, soft umbilical hernia noted   Musculoskeletal:         General: Normal range of motion.      Cervical back: Normal range of motion and neck supple.   Skin:     General: Skin is warm and dry.      Capillary Refill: Capillary refill takes less than 2 seconds.      Coloration: Skin is pale.   Neurological:      General: No focal deficit present.      Mental Status: She is alert and oriented to person, place, and time. Mental status is at baseline.   Psychiatric:         Mood and Affect: Mood normal.         Behavior: Behavior normal.         Thought Content: Thought content normal.         Judgment: Judgment normal.           CRANIAL NERVES     CN III, IV, VI   Pupils are equal, round, and reactive to light.       Significant Labs:   All pertinent labs within the past 24 hours have been reviewed.  CBC:   Recent Labs   Lab 01/13/22  1254 01/13/22  1547   WBC 5.69 4.97   HGB 5.3* 4.8*   HCT 20.4* 18.4*    249     CMP:   Recent Labs   Lab 01/13/22  1254 01/13/22  1547    141   K 3.8 3.8    109   CO2  23 22*    104   BUN 16 19   CREATININE 1.0 0.8   CALCIUM 9.0 8.7   PROT  --  6.0   ALBUMIN  --  3.4*   BILITOT  --  0.3   ALKPHOS  --  60   AST  --  12   ALT  --  9*   ANIONGAP 11 10   EGFRNONAA 56* >60       Significant Imaging: I have reviewed all pertinent imaging results/findings within the past 24 hours.    Assessment/Plan:     * Severe anemia  Patient's anemia is currently uncontrolled. Has not received any PRBCs to date.. Etiology likely d/t FAISAL  Current CBC reviewed-   Lab Results   Component Value Date    HGB 4.8 (LL) 01/13/2022    HCT 18.4 (LL) 01/13/2022     -transfuse 2 units packed red blood cells with Lasix after each unit  -follow CBC closely  -monitor on telemetry  -discussed with GI who we have consulted for severe FAISAL.  Plan for EGD and colonoscopy tomorrow.  Prepping tonight.      Drug-induced constipation  Chronic issue, prepping for colonoscopy tonight  -would benefit from routine bowel regimen the future      Seizure disorder  Chronic issue, no recent seizure activity reported  -continue outpatient Depakote  -seizure precautions      HTN (hypertension)  Chronic, controlled.  Latest blood pressure and vitals reviewed-   Temp:  [98.1 °F (36.7 °C)]   Pulse:  [82]   Resp:  [20]   BP: (119)/(58)   SpO2:  [96 %] .   Home meds for hypertension were reviewed and noted below.   Hypertension Medications             amlodipine-benazepril 5-20 mg (LOTREL) 5-20 mg per capsule Take 1 capsule by mouth once daily.    furosemide (LASIX) 40 MG tablet Take 40 mg by mouth once daily.     metOLazone (ZAROXOLYN) 2.5 MG tablet Take 2.5 mg by mouth. Take 1 tablet twice a week          While in the hospital, will manage blood pressure as follows; Continue home antihypertensive regimen    Will utilize p.r.n. blood pressure medication only if patient's blood pressure greater than  180/110 and she develops symptoms such as worsening chest pain or shortness of breath.        Chronic pain  Maintained on chronic  opiate therapy  -check   -continue outpatient opiate therapy and Lyrica        VTE Risk Mitigation (From admission, onward)         Ordered     IP VTE HIGH RISK PATIENT  Once         01/13/22 1701     Place sequential compression device  Until discontinued         01/13/22 1701     Place CAIR hose  Until discontinued         01/13/22 1701                   Sammie Tamez NP  Department of Hospital Medicine   [facility] - Emergency Dept

## 2022-01-13 NOTE — ASSESSMENT & PLAN NOTE
Chronic issue, no recent seizure activity reported  -continue outpatient Depakote  -seizure precautions

## 2022-01-13 NOTE — ASSESSMENT & PLAN NOTE
Chronic issue, prepping for colonoscopy tonight  -would benefit from routine bowel regimen the future

## 2022-01-13 NOTE — TELEPHONE ENCOUNTER
Sade Menard from [facility] called from [phone] - [facility] 5.3

## 2022-01-13 NOTE — HPI
Froilan Ray is a 76-year-old female with PMHx significant for HTN, anemia, chronic pain maintained on opiate therapy, and seizure disorder.  She presented the ED at the recommendation of her for sedation for abnormal labs.  She was found to have profound anemia on her routine labs.  She endorses months fatigue and weakness.  She denies any chest pain, SOB, nausea, vomiting, abdominal pain, black/tarry stool, hematemesis, or juan bloody stool.  She reports Hx of anemia and reports at 1 time she was told she had bleeding ulcers.  She is unable to quantify the time between her last endoscopic evaluation.  She is unsure that she has ever had a colonoscopy due to poor prep in the past when attempted.  She is not actively taking any iron therapy.  She does endorse daily use of Aleve -2 tabs daily.  She denies any further NSAID use.  Her workup in the ED was significant for profound anemia.  She was ordered for blood transfusion.  Case was discussed with GI given significant microcytic anemia concerning for iron deficiency anemia with possible bleeding etiology.  She will prep tonight for endoscopic evaluation tomorrow.  Pt was placed in observation for further evaluation management.  Other pertinent medical Hx as below:

## 2022-01-13 NOTE — SUBJECTIVE & OBJECTIVE
Past Medical History:   Diagnosis Date    Anemia     Arthritis     Bleeding ulcer 07/2016    Chronic pain     DDD (degenerative disc disease), cervical     DDD (degenerative disc disease), lumbar     Depression     Disc degeneration, lumbosacral     Diverticulitis     Encounter for blood transfusion     Hypertension     IBS (irritable bowel syndrome)     Neuropathy of both feet     Seizures     none  since 2017    Umbilical hernia 2020       Past Surgical History:   Procedure Laterality Date    BACK SURGERY      BREAST SURGERY Right     lumpectomy    ENDOSCOPIC ULTRASOUND OF UPPER GASTROINTESTINAL TRACT N/A 7/21/2020    Procedure: ULTRASOUND, UPPER GI TRACT, ENDOSCOPIC;  Surgeon: Marcio Nguyễn III, MD;  Location: [facility];  Service: Endoscopy;  Laterality: N/A;    EYE SURGERY      cataract    HYSTERECTOMY      INTRAMEDULLARY RODDING OF TROCHANTER OF FEMUR Left 12/11/2018    Procedure: INSERTION, INTRAMEDULLARY SANTOS, FEMUR, TROCHANTER;  Surgeon: Eulalio De La Cruz MD;  Location: [facility];  Service: Orthopedics;  Laterality: Left;    SPINAL CORD STIMULATOR IMPLANT  09/18/2013    and removal    SPINE SURGERY  2006    lumbar L2-S1 decompression.    SPINE SURGERY      cervical decompression    TONSILLECTOMY         Review of patient's allergies indicates:  No Known Allergies    No current facility-administered medications on file prior to encounter.     Current Outpatient Medications on File Prior to Encounter   Medication Sig    acetaminophen (TYLENOL) 325 MG tablet Take 650 mg by mouth once.    amlodipine-benazepril 5-20 mg (LOTREL) 5-20 mg per capsule Take 1 capsule by mouth once daily.    cyclobenzaprine (FLEXERIL) 10 MG tablet Take 10 mg by mouth 3 (three) times daily.    divalproex ER (DEPAKOTE) 500 MG Tb24 Take 500 mg by mouth. Take 2 tablets every morning and 1 tablet every evening    EScitalopram oxalate (LEXAPRO) 20 MG tablet TAKE 1 TABLET(20 MG) BY MOUTH EVERY DAY     famotidine (PEPCID) 20 MG tablet Take 20 mg by mouth daily as needed for Heartburn.    furosemide (LASIX) 40 MG tablet Take 40 mg by mouth once daily.     metOLazone (ZAROXOLYN) 2.5 MG tablet Take 2.5 mg by mouth. Take 1 tablet twice a week    [START ON 1/14/2022] oxyCODONE-acetaminophen (PERCOCET)  mg per tablet Take 1 tablet by mouth every 6 (six) hours as needed for Pain.    potassium chloride SA (K-DUR,KLOR-CON) 20 MEQ tablet Take 20 mEq by mouth 2 (two) times daily.    pregabalin (LYRICA) 150 MG capsule Take 150 mg by mouth 3 (three) times daily.    pregabalin (LYRICA) 200 MG Cap Take 1 capsule (200 mg total) by mouth 3 (three) times daily.     Family History     Problem Relation (Age of Onset)    COPD Brother    Coronary artery disease Father    Depression Father    Diabetes Mother, Father, Sister, Brother    Heart disease Father    Hypertension Mother, Father    Irritable bowel syndrome Mother        Tobacco Use    Smoking status: Never Smoker    Smokeless tobacco: Never Used   Substance and Sexual Activity    Alcohol use: No    Drug use: No    Sexual activity: Not Currently     Review of Systems   Constitutional: Positive for fatigue. Negative for chills and fever.   HENT: Negative for congestion, postnasal drip and trouble swallowing.    Eyes: Negative for photophobia and visual disturbance.   Respiratory: Negative for cough, shortness of breath and wheezing.    Cardiovascular: Positive for leg swelling. Negative for chest pain and palpitations.   Gastrointestinal: Positive for constipation (chronic). Negative for abdominal distention, abdominal pain, nausea and vomiting.   Genitourinary: Negative for difficulty urinating and dysuria.   Musculoskeletal: Positive for back pain (chronic) and neck pain (chronic).   Skin: Negative for color change.   Neurological: Positive for weakness. Negative for dizziness, light-headedness and headaches.   Psychiatric/Behavioral: Negative for confusion. The  patient is not nervous/anxious.      Objective:     Vital Signs (Most Recent):  Temp: 98.1 °F (36.7 °C) (01/13/22 1506)  Pulse: 82 (01/13/22 1506)  Resp: 20 (01/13/22 1506)  BP: (!) 119/58 (01/13/22 1506)  SpO2: 96 % (01/13/22 1506) Vital Signs (24h Range):  Temp:  [98.1 °F (36.7 °C)] 98.1 °F (36.7 °C)  Pulse:  [82] 82  Resp:  [20] 20  SpO2:  [96 %] 96 %  BP: (119)/(58) 119/58     Weight: 77.1 kg (170 lb)  Body mass index is 27.44 kg/m².    Physical Exam  Vitals and nursing note reviewed.   Constitutional:       Appearance: Normal appearance.   HENT:      Head: Normocephalic and atraumatic.      Mouth/Throat:      Mouth: Mucous membranes are dry.      Pharynx: Oropharynx is clear.   Eyes:      Extraocular Movements: Extraocular movements intact.      Conjunctiva/sclera: Conjunctivae normal.      Pupils: Pupils are equal, round, and reactive to light.   Cardiovascular:      Rate and Rhythm: Normal rate and regular rhythm.   Abdominal:      General: Abdomen is flat. Bowel sounds are normal. There is no distension.      Palpations: Abdomen is soft.      Tenderness: There is no abdominal tenderness.      Comments: Reducible, soft umbilical hernia noted   Musculoskeletal:         General: Normal range of motion.      Cervical back: Normal range of motion and neck supple.   Skin:     General: Skin is warm and dry.      Capillary Refill: Capillary refill takes less than 2 seconds.      Coloration: Skin is pale.   Neurological:      General: No focal deficit present.      Mental Status: She is alert and oriented to person, place, and time. Mental status is at baseline.   Psychiatric:         Mood and Affect: Mood normal.         Behavior: Behavior normal.         Thought Content: Thought content normal.         Judgment: Judgment normal.           CRANIAL NERVES     CN III, IV, VI   Pupils are equal, round, and reactive to light.       Significant Labs:   All pertinent labs within the past 24 hours have been reviewed.  CBC:    Recent Labs   Lab 01/13/22  1254 01/13/22  1547   WBC 5.69 4.97   HGB 5.3* 4.8*   HCT 20.4* 18.4*    249     CMP:   Recent Labs   Lab 01/13/22  1254 01/13/22  1547    141   K 3.8 3.8    109   CO2 23 22*    104   BUN 16 19   CREATININE 1.0 0.8   CALCIUM 9.0 8.7   PROT  --  6.0   ALBUMIN  --  3.4*   BILITOT  --  0.3   ALKPHOS  --  60   AST  --  12   ALT  --  9*   ANIONGAP 11 10   EGFRNONAA 56* >60       Significant Imaging: I have reviewed all pertinent imaging results/findings within the past 24 hours.

## 2022-01-13 NOTE — ASSESSMENT & PLAN NOTE
Patient's anemia is currently uncontrolled. Has not received any PRBCs to date.. Etiology likely d/t FAISAL  Current CBC reviewed-   Lab Results   Component Value Date    HGB 4.8 (LL) 01/13/2022    HCT 18.4 (LL) 01/13/2022     -transfuse 2 units packed red blood cells with Lasix after each unit  -follow CBC closely  -monitor on telemetry  -discussed with GI who we have consulted for severe FAISAL.  Plan for EGD and colonoscopy tomorrow.  Prepping tonight.

## 2022-01-13 NOTE — ED NOTES
First unit PRBCs infusing without difficulty via pump with blood tubing & NS backup. Tolerated well. Will continue to monitor

## 2022-01-13 NOTE — ASSESSMENT & PLAN NOTE
Chronic, controlled.  Latest blood pressure and vitals reviewed-   Temp:  [98.1 °F (36.7 °C)]   Pulse:  [82]   Resp:  [20]   BP: (119)/(58)   SpO2:  [96 %] .   Home meds for hypertension were reviewed and noted below.   Hypertension Medications             amlodipine-benazepril 5-20 mg (LOTREL) 5-20 mg per capsule Take 1 capsule by mouth once daily.    furosemide (LASIX) 40 MG tablet Take 40 mg by mouth once daily.     metOLazone (ZAROXOLYN) 2.5 MG tablet Take 2.5 mg by mouth. Take 1 tablet twice a week          While in the hospital, will manage blood pressure as follows; Continue home antihypertensive regimen    Will utilize p.r.n. blood pressure medication only if patient's blood pressure greater than  180/110 and she develops symptoms such as worsening chest pain or shortness of breath.

## 2022-01-14 ENCOUNTER — ANESTHESIA EVENT (OUTPATIENT)
Dept: ENDOSCOPY | Facility: HOSPITAL | Age: 77
DRG: 379 | End: 2022-01-14
Payer: MEDICARE

## 2022-01-14 ENCOUNTER — ANESTHESIA (OUTPATIENT)
Dept: ENDOSCOPY | Facility: HOSPITAL | Age: 77
DRG: 379 | End: 2022-01-14
Payer: MEDICARE

## 2022-01-14 PROBLEM — K25.4 CHRONIC GASTRIC ULCER WITH BLEEDING: Status: ACTIVE | Noted: 2022-01-14

## 2022-01-14 LAB
ALBUMIN SERPL BCP-MCNC: 3.5 G/DL (ref 3.5–5.2)
ALP SERPL-CCNC: 64 U/L (ref 55–135)
ALT SERPL W/O P-5'-P-CCNC: 14 U/L (ref 10–44)
ANION GAP SERPL CALC-SCNC: 11 MMOL/L (ref 8–16)
AST SERPL-CCNC: 23 U/L (ref 10–40)
BASOPHILS # BLD AUTO: 0.07 K/UL (ref 0–0.2)
BASOPHILS NFR BLD: 1.5 % (ref 0–1.9)
BILIRUB SERPL-MCNC: 1 MG/DL (ref 0.1–1)
BUN SERPL-MCNC: 15 MG/DL (ref 8–23)
CALCIUM SERPL-MCNC: 8.9 MG/DL (ref 8.7–10.5)
CHLORIDE SERPL-SCNC: 108 MMOL/L (ref 95–110)
CO2 SERPL-SCNC: 22 MMOL/L (ref 23–29)
CREAT SERPL-MCNC: 0.8 MG/DL (ref 0.5–1.4)
DIFFERENTIAL METHOD: ABNORMAL
EOSINOPHIL # BLD AUTO: 0.4 K/UL (ref 0–0.5)
EOSINOPHIL NFR BLD: 7.6 % (ref 0–8)
ERYTHROCYTE [DISTWIDTH] IN BLOOD BY AUTOMATED COUNT: 17.5 % (ref 11.5–14.5)
EST. GFR  (AFRICAN AMERICAN): >60 ML/MIN/1.73 M^2
EST. GFR  (NON AFRICAN AMERICAN): >60 ML/MIN/1.73 M^2
GLUCOSE SERPL-MCNC: 95 MG/DL (ref 70–110)
HCT VFR BLD AUTO: 26.8 % (ref 37–48.5)
HGB BLD-MCNC: 7.8 G/DL (ref 12–16)
IMM GRANULOCYTES # BLD AUTO: 0.01 K/UL (ref 0–0.04)
IMM GRANULOCYTES NFR BLD AUTO: 0.2 % (ref 0–0.5)
IRON SERPL-MCNC: 14 UG/DL (ref 30–160)
LYMPHOCYTES # BLD AUTO: 1.7 K/UL (ref 1–4.8)
LYMPHOCYTES NFR BLD: 36 % (ref 18–48)
MCH RBC QN AUTO: 21.5 PG (ref 27–31)
MCHC RBC AUTO-ENTMCNC: 29.1 G/DL (ref 32–36)
MCV RBC AUTO: 74 FL (ref 82–98)
MONOCYTES # BLD AUTO: 0.5 K/UL (ref 0.3–1)
MONOCYTES NFR BLD: 10.6 % (ref 4–15)
NEUTROPHILS # BLD AUTO: 2.1 K/UL (ref 1.8–7.7)
NEUTROPHILS NFR BLD: 44.1 % (ref 38–73)
NRBC BLD-RTO: 0 /100 WBC
PLATELET # BLD AUTO: 248 K/UL (ref 150–450)
PMV BLD AUTO: 10.8 FL (ref 9.2–12.9)
POTASSIUM SERPL-SCNC: 3.7 MMOL/L (ref 3.5–5.1)
PROT SERPL-MCNC: 6.2 G/DL (ref 6–8.4)
RBC # BLD AUTO: 3.62 M/UL (ref 4–5.4)
SATURATED IRON: 3 % (ref 20–50)
SODIUM SERPL-SCNC: 141 MMOL/L (ref 136–145)
TOTAL IRON BINDING CAPACITY: 469 UG/DL (ref 250–450)
TRANSFERRIN SERPL-MCNC: 317 MG/DL (ref 200–375)
WBC # BLD AUTO: 4.72 K/UL (ref 3.9–12.7)

## 2022-01-14 PROCEDURE — 45380 COLONOSCOPY AND BIOPSY: CPT | Performed by: INTERNAL MEDICINE

## 2022-01-14 PROCEDURE — 43239 PR EGD, FLEX, W/BIOPSY, SGL/MULTI: ICD-10-PCS | Mod: ,,, | Performed by: INTERNAL MEDICINE

## 2022-01-14 PROCEDURE — 85025 COMPLETE CBC W/AUTO DIFF WBC: CPT | Performed by: NURSE PRACTITIONER

## 2022-01-14 PROCEDURE — 45380 PR COLONOSCOPY,BIOPSY: ICD-10-PCS | Mod: ,,, | Performed by: INTERNAL MEDICINE

## 2022-01-14 PROCEDURE — 96376 TX/PRO/DX INJ SAME DRUG ADON: CPT | Performed by: EMERGENCY MEDICINE

## 2022-01-14 PROCEDURE — 94761 N-INVAS EAR/PLS OXIMETRY MLT: CPT

## 2022-01-14 PROCEDURE — 88305 TISSUE EXAM BY PATHOLOGIST: CPT | Mod: 26,,, | Performed by: STUDENT IN AN ORGANIZED HEALTH CARE EDUCATION/TRAINING PROGRAM

## 2022-01-14 PROCEDURE — 88305 TISSUE EXAM BY PATHOLOGIST: CPT | Mod: 59 | Performed by: STUDENT IN AN ORGANIZED HEALTH CARE EDUCATION/TRAINING PROGRAM

## 2022-01-14 PROCEDURE — D9220A PRA ANESTHESIA: Mod: CRNA,,, | Performed by: NURSE ANESTHETIST, CERTIFIED REGISTERED

## 2022-01-14 PROCEDURE — 99223 1ST HOSP IP/OBS HIGH 75: CPT | Mod: ,,, | Performed by: INTERNAL MEDICINE

## 2022-01-14 PROCEDURE — 12000002 HC ACUTE/MED SURGE SEMI-PRIVATE ROOM

## 2022-01-14 PROCEDURE — D9220A PRA ANESTHESIA: ICD-10-PCS | Mod: ANES,,, | Performed by: ANESTHESIOLOGY

## 2022-01-14 PROCEDURE — 43255 PR EGD, FLEX, W/CTRL BLEED, ANY METHOD: ICD-10-PCS | Mod: 59,,, | Performed by: INTERNAL MEDICINE

## 2022-01-14 PROCEDURE — 80053 COMPREHEN METABOLIC PANEL: CPT | Performed by: NURSE PRACTITIONER

## 2022-01-14 PROCEDURE — 36415 COLL VENOUS BLD VENIPUNCTURE: CPT | Performed by: NURSE PRACTITIONER

## 2022-01-14 PROCEDURE — D9220A PRA ANESTHESIA: Mod: ANES,,, | Performed by: ANESTHESIOLOGY

## 2022-01-14 PROCEDURE — 63600175 PHARM REV CODE 636 W HCPCS: Performed by: INTERNAL MEDICINE

## 2022-01-14 PROCEDURE — 45380 COLONOSCOPY AND BIOPSY: CPT | Mod: ,,, | Performed by: INTERNAL MEDICINE

## 2022-01-14 PROCEDURE — 27201012 HC FORCEPS, HOT/COLD, DISP: Performed by: INTERNAL MEDICINE

## 2022-01-14 PROCEDURE — 27201038 HC PROBE, BI-POLAR: Performed by: INTERNAL MEDICINE

## 2022-01-14 PROCEDURE — 43239 EGD BIOPSY SINGLE/MULTIPLE: CPT | Mod: ,,, | Performed by: INTERNAL MEDICINE

## 2022-01-14 PROCEDURE — 88342 IMHCHEM/IMCYTCHM 1ST ANTB: CPT | Mod: 26,,, | Performed by: STUDENT IN AN ORGANIZED HEALTH CARE EDUCATION/TRAINING PROGRAM

## 2022-01-14 PROCEDURE — C9113 INJ PANTOPRAZOLE SODIUM, VIA: HCPCS | Performed by: INTERNAL MEDICINE

## 2022-01-14 PROCEDURE — 43255 EGD CONTROL BLEEDING ANY: CPT | Mod: 59,,, | Performed by: INTERNAL MEDICINE

## 2022-01-14 PROCEDURE — 43239 EGD BIOPSY SINGLE/MULTIPLE: CPT | Performed by: INTERNAL MEDICINE

## 2022-01-14 PROCEDURE — 88305 TISSUE EXAM BY PATHOLOGIST: ICD-10-PCS | Mod: 26,,, | Performed by: STUDENT IN AN ORGANIZED HEALTH CARE EDUCATION/TRAINING PROGRAM

## 2022-01-14 PROCEDURE — 25000003 PHARM REV CODE 250: Performed by: NURSE ANESTHETIST, CERTIFIED REGISTERED

## 2022-01-14 PROCEDURE — 88342 IMHCHEM/IMCYTCHM 1ST ANTB: CPT | Performed by: STUDENT IN AN ORGANIZED HEALTH CARE EDUCATION/TRAINING PROGRAM

## 2022-01-14 PROCEDURE — 25000003 PHARM REV CODE 250: Performed by: NURSE PRACTITIONER

## 2022-01-14 PROCEDURE — 37000009 HC ANESTHESIA EA ADD 15 MINS: Performed by: INTERNAL MEDICINE

## 2022-01-14 PROCEDURE — D9220A PRA ANESTHESIA: ICD-10-PCS | Mod: CRNA,,, | Performed by: NURSE ANESTHETIST, CERTIFIED REGISTERED

## 2022-01-14 PROCEDURE — 43255 EGD CONTROL BLEEDING ANY: CPT | Performed by: INTERNAL MEDICINE

## 2022-01-14 PROCEDURE — 63600175 PHARM REV CODE 636 W HCPCS: Performed by: NURSE PRACTITIONER

## 2022-01-14 PROCEDURE — 99223 PR INITIAL HOSPITAL CARE,LEVL III: ICD-10-PCS | Mod: ,,, | Performed by: INTERNAL MEDICINE

## 2022-01-14 PROCEDURE — 99900035 HC TECH TIME PER 15 MIN (STAT)

## 2022-01-14 PROCEDURE — 37000008 HC ANESTHESIA 1ST 15 MINUTES: Performed by: INTERNAL MEDICINE

## 2022-01-14 PROCEDURE — 63600175 PHARM REV CODE 636 W HCPCS: Performed by: NURSE ANESTHETIST, CERTIFIED REGISTERED

## 2022-01-14 PROCEDURE — 36430 TRANSFUSION BLD/BLD COMPNT: CPT

## 2022-01-14 PROCEDURE — 88342 CHG IMMUNOCYTOCHEMISTRY: ICD-10-PCS | Mod: 26,,, | Performed by: STUDENT IN AN ORGANIZED HEALTH CARE EDUCATION/TRAINING PROGRAM

## 2022-01-14 RX ORDER — PHENYLEPHRINE HYDROCHLORIDE 10 MG/ML
INJECTION INTRAVENOUS
Status: DISCONTINUED | OUTPATIENT
Start: 2022-01-14 | End: 2022-01-14

## 2022-01-14 RX ORDER — SODIUM CHLORIDE 9 MG/ML
INJECTION, SOLUTION INTRAVENOUS CONTINUOUS
Status: DISCONTINUED | OUTPATIENT
Start: 2022-01-14 | End: 2022-01-14

## 2022-01-14 RX ORDER — PROPOFOL 10 MG/ML
VIAL (ML) INTRAVENOUS
Status: DISCONTINUED | OUTPATIENT
Start: 2022-01-14 | End: 2022-01-14

## 2022-01-14 RX ORDER — CYANOCOBALAMIN 1000 UG/ML
1000 INJECTION, SOLUTION INTRAMUSCULAR; SUBCUTANEOUS ONCE
Status: COMPLETED | OUTPATIENT
Start: 2022-01-14 | End: 2022-01-14

## 2022-01-14 RX ORDER — LIDOCAINE HYDROCHLORIDE 20 MG/ML
INJECTION INTRAVENOUS
Status: DISCONTINUED | OUTPATIENT
Start: 2022-01-14 | End: 2022-01-14

## 2022-01-14 RX ORDER — PANTOPRAZOLE SODIUM 40 MG/10ML
40 INJECTION, POWDER, LYOPHILIZED, FOR SOLUTION INTRAVENOUS 2 TIMES DAILY
Status: DISCONTINUED | OUTPATIENT
Start: 2022-01-14 | End: 2022-01-15 | Stop reason: HOSPADM

## 2022-01-14 RX ADMIN — PREGABALIN 200 MG: 75 CAPSULE ORAL at 05:01

## 2022-01-14 RX ADMIN — PANTOPRAZOLE SODIUM 40 MG: 40 INJECTION, POWDER, LYOPHILIZED, FOR SOLUTION INTRAVENOUS at 12:01

## 2022-01-14 RX ADMIN — PROPOFOL 50 MG: 10 INJECTION, EMULSION INTRAVENOUS at 10:01

## 2022-01-14 RX ADMIN — FUROSEMIDE 20 MG: 10 INJECTION, SOLUTION INTRAMUSCULAR; INTRAVENOUS at 03:01

## 2022-01-14 RX ADMIN — OXYCODONE AND ACETAMINOPHEN 1 TABLET: 10; 325 TABLET ORAL at 12:01

## 2022-01-14 RX ADMIN — CYANOCOBALAMIN 1000 MCG: 1000 INJECTION, SOLUTION INTRAMUSCULAR; SUBCUTANEOUS at 02:01

## 2022-01-14 RX ADMIN — CYCLOBENZAPRINE HYDROCHLORIDE 10 MG: 5 TABLET, FILM COATED ORAL at 02:01

## 2022-01-14 RX ADMIN — OXYCODONE AND ACETAMINOPHEN 1 TABLET: 10; 325 TABLET ORAL at 06:01

## 2022-01-14 RX ADMIN — PREGABALIN 200 MG: 75 CAPSULE ORAL at 12:01

## 2022-01-14 RX ADMIN — PANTOPRAZOLE SODIUM 40 MG: 40 INJECTION, POWDER, LYOPHILIZED, FOR SOLUTION INTRAVENOUS at 09:01

## 2022-01-14 RX ADMIN — OXYCODONE AND ACETAMINOPHEN 1 TABLET: 10; 325 TABLET ORAL at 03:01

## 2022-01-14 RX ADMIN — SODIUM CHLORIDE: 0.9 INJECTION, SOLUTION INTRAVENOUS at 02:01

## 2022-01-14 RX ADMIN — ESCITALOPRAM OXALATE 20 MG: 10 TABLET, FILM COATED ORAL at 12:01

## 2022-01-14 RX ADMIN — IRON SUCROSE 200 MG: 20 INJECTION, SOLUTION INTRAVENOUS at 02:01

## 2022-01-14 RX ADMIN — PHENYLEPHRINE HYDROCHLORIDE 100 MCG: 10 INJECTION INTRAVENOUS at 10:01

## 2022-01-14 RX ADMIN — LIDOCAINE HYDROCHLORIDE 75 MG: 20 INJECTION, SOLUTION INTRAVENOUS at 10:01

## 2022-01-14 NOTE — PROGRESS NOTES
[facility] Medicine  Progress Note    Patient Name: Froilan Ray  MRN: [id number]  Patient Class: IP- Inpatient   Admission Date: 1/13/2022  Length of Stay: 0 days  Attending Physician: Isaac Valenzuela MD  Primary Care Provider: Quinn Rangel MD        Subjective:     Principal Problem:Severe anemia        HPI:  Froilan Ray is a 76-year-old female with PMHx significant for HTN, anemia, chronic pain maintained on opiate therapy, and seizure disorder.  She presented the ED at the recommendation of her for sedation for abnormal labs.  She was found to have profound anemia on her routine labs.  She endorses months fatigue and weakness.  She denies any chest pain, SOB, nausea, vomiting, abdominal pain, black/tarry stool, hematemesis, or juan bloody stool.  She reports Hx of anemia and reports at 1 time she was told she had bleeding ulcers.  She is unable to quantify the time between her last endoscopic evaluation.  She is unsure that she has ever had a colonoscopy due to poor prep in the past when attempted.  She is not actively taking any iron therapy.  She does endorse daily use of Aleve -2 tabs daily.  She denies any further NSAID use.  Her workup in the ED was significant for profound anemia.  She was ordered for blood transfusion.  Case was discussed with GI given significant microcytic anemia concerning for iron deficiency anemia with possible bleeding etiology.  She will prep tonight for endoscopic evaluation tomorrow.  Pt was placed in observation for further evaluation management.  Other pertinent medical Hx as below:      Overview/Hospital Course:  No notes on file    Interval History:  Pt seen and examined.  Was found to have oozing gastric ulcer on EGD treated with bipolar cautery. GI recommends continued admission with IV PPI twice daily, as well as iron and vitamin B12 replacement.  Pt reports fatigue has improved.  She denies any chest pain, SOB, any evidence of blood  in stool overnight with colon prep.  Discussed plan for overnight surveillance and continued treatment of her blood abnormalities.  She understands    Review of Systems   Constitutional: Negative for chills, fatigue and fever.   HENT: Negative for trouble swallowing.    Respiratory: Negative for cough, shortness of breath and wheezing.    Gastrointestinal: Negative for abdominal pain, blood in stool, nausea and vomiting.   Genitourinary: Negative for dysuria.   Musculoskeletal: Negative for arthralgias.     Objective:     Vital Signs (Most Recent):  Temp: 97.7 °F (36.5 °C) (01/14/22 1239)  Pulse: 86 (01/14/22 1239)  Resp: 18 (01/14/22 1239)  BP: (!) 150/71 (01/14/22 1239)  SpO2: 99 % (01/14/22 1239) Vital Signs (24h Range):  Temp:  [97.3 °F (36.3 °C)-99.2 °F (37.3 °C)] 97.7 °F (36.5 °C)  Pulse:  [60-86] 86  Resp:  [14-20] 18  SpO2:  [94 %-100 %] 99 %  BP: ()/(53-92) 150/71     Weight: 77.1 kg (170 lb)  Body mass index is 27.44 kg/m².    Intake/Output Summary (Last 24 hours) at 1/14/2022 1314  Last data filed at 1/14/2022 1050  Gross per 24 hour   Intake 5497 ml   Output --   Net 5497 ml      Physical Exam  Vitals and nursing note reviewed.   Constitutional:       Appearance: Normal appearance.   HENT:      Head: Normocephalic and atraumatic.   Eyes:      Extraocular Movements: Extraocular movements intact.      Pupils: Pupils are equal, round, and reactive to light.   Cardiovascular:      Rate and Rhythm: Normal rate and regular rhythm.   Pulmonary:      Effort: Pulmonary effort is normal.      Breath sounds: Normal breath sounds.   Abdominal:      General: Abdomen is flat. Bowel sounds are normal. There is no distension.      Palpations: Abdomen is soft.      Tenderness: There is no abdominal tenderness.   Musculoskeletal:         General: Normal range of motion.      Cervical back: Normal range of motion and neck supple.   Skin:     General: Skin is warm and dry.      Capillary Refill: Capillary refill takes  less than 2 seconds.   Neurological:      General: No focal deficit present.      Mental Status: She is alert and oriented to person, place, and time. Mental status is at baseline.   Psychiatric:         Mood and Affect: Mood normal.         Significant Labs:   All pertinent labs within the past 24 hours have been reviewed.  CBC:   Recent Labs   Lab 01/13/22  1254 01/13/22  1547 01/14/22  0408   WBC 5.69 4.97 4.72   HGB 5.3* 4.8* 7.8*   HCT 20.4* 18.4* 26.8*    249 248     CMP:   Recent Labs   Lab 01/13/22  1254 01/13/22  1547 01/14/22  0408    141 141   K 3.8 3.8 3.7    109 108   CO2 23 22* 22*    104 95   BUN 16 19 15   CREATININE 1.0 0.8 0.8   CALCIUM 9.0 8.7 8.9   PROT  --  6.0 6.2   ALBUMIN  --  3.4* 3.5   BILITOT  --  0.3 1.0   ALKPHOS  --  60 64   AST  --  12 23   ALT  --  9* 14   ANIONGAP 11 10 11   EGFRNONAA 56* >60 >60       Significant Imaging: I have reviewed all pertinent imaging results/findings within the past 24 hours.      Assessment/Plan:      * Severe anemia  Patient's anemia is currently uncontrolled. Has recieved 2 units of PRBCs on 1/13/22. Etiology likely d/t GI bleed, FAISAL, B12 deficiency.  Current CBC reviewed-   Lab Results   Component Value Date    HGB 7.8 (L) 01/14/2022    HCT 26.8 (L) 01/14/2022     -transfused 2 units packed red blood cells with Lasix after each unit  -CBC improved  -follow CBC closely  -monitor on telemetry  -discussed with GI who we have consulted for severe FAISAL.    -EGD + active bleeding ulcer: treated.      Chronic gastric ulcer with bleeding  Patient with historical gastric ulcer bleeding: Acute on chronic  -Treated with cautery  -Received 2 units RBCs  -Monitor CBC  -IV PPI BID  -Plan for d/c tomorrow if remains stable on continued PPI treatment.      Drug-induced constipation  Chronic issue, prepping for colonoscopy tonight  -would benefit from routine bowel regimen the future      Seizure disorder  No longer medicated (no seizure in 5  yrs)  -seizure precautions      HTN (hypertension)  Chronic, controlled.  Latest blood pressure and vitals reviewed-   Temp:  [98.1 °F (36.7 °C)]   Pulse:  [82]   Resp:  [20]   BP: (119)/(58)   SpO2:  [96 %] .   Home meds for hypertension were reviewed and noted below.   Hypertension Medications             amlodipine-benazepril 5-20 mg (LOTREL) 5-20 mg per capsule Take 1 capsule by mouth once daily.    furosemide (LASIX) 40 MG tablet Take 40 mg by mouth once daily.     metOLazone (ZAROXOLYN) 2.5 MG tablet Take 2.5 mg by mouth. Take 1 tablet twice a week          While in the hospital, will manage blood pressure as follows; Continue home antihypertensive regimen    Will utilize p.r.n. blood pressure medication only if patient's blood pressure greater than  180/110 and she develops symptoms such as worsening chest pain or shortness of breath.        Chronic pain  Maintained on chronic opiate therapy  -check   -continue outpatient opiate therapy and Lyrica        VTE Risk Mitigation (From admission, onward)         Ordered     IP VTE HIGH RISK PATIENT  Once         01/13/22 1701     Place sequential compression device  Until discontinued         01/13/22 1701     Place CARI hose  Until discontinued         01/13/22 1701                Discharge Planning   BAILEY:  1/15/22    Code Status: Full Code   Is the patient medically ready for discharge?:     Reason for patient still in hospital (select all that apply): Patient trending condition, Laboratory test, Treatment and Consult recommendations  Discharge Plan A: Home                  Sammie Tamez NP  Department of [facility] Medicine   [facility]

## 2022-01-14 NOTE — PLAN OF CARE
EGD;  -Multiple gastric ulcers, one with small amount of oozing treated with cautery  -Gastritis, biopsied    Colonoscopy:  -Diverticulosis  -Decreased vascular markings sigmoid colon, biopsied  -Hemorrhoids    Recommendation:  -PPI IV BID while inpatient  -Ok for regular diet  -Avoid NSAIDs  -Would monitor in hospital overnight- if hemoglobin stable ok to discharge in AM on BID PPI x 8 weeks, oral iron supplementation and Vitamin B12 supplementation  -Would give IV iron as inpatient  -Please call with questions    Abby Landers MD

## 2022-01-14 NOTE — PLAN OF CARE
[facility]  Initial Discharge Assessment       Primary Care Provider: Quinn Rangel MD    Admission Diagnosis: Severe anemia [D64.9]    Admission Date: 1/13/2022  Expected Discharge Date: to be determined    SW met with pt at bedside to complete discharge assessment, verified PCP, pharmacy and information on facesheet.  Pt has straight cane, BSC, rollator, RW and WC at home.  Pt uses cane and RW to ambulate and independent with ADLs and spouse provide transportation to MD appts.  Pt lives with son and spouse.  Spouse will provide transportation home.  No needs identified at this time.    Discharge Barriers Identified: None    Payor: [employer] MANAGED MEDICARE / Plan: [employer] MEDICARE PLAN PPO / Product Type: Medicare Advantage /     Extended Emergency Contact Information  Primary Emergency Contact: Otoniel Ray  Address: [id number] [place] [address]  Home Phone: [phone]  Mobile Phone: [phone]  Relation: Spouse  Secondary Emergency Contact: SAHRA MCGUIRE  Mobile Phone: [phone]  Relation: Son    Discharge Plan A: Home  Discharge Plan B: Home Health      [id number] - [place], [place] - [address] [facility]  [address]  [place] [address]  Phone: [phone] Fax: [phone]      Initial Assessment (most recent)     Adult Discharge Assessment - 01/14/22 1201        Discharge Assessment    Assessment Type Discharge Planning Assessment     Confirmed/corrected address, phone number and insurance Yes     Confirmed Demographics Correct on Facesheet     Source of Information patient     Does patient/caregiver understand observation status Yes     Communicated BAILEY with patient/caregiver No     Lives With child(shavon), adult;spouse     Do you expect to return to your current living situation? Yes     Prior to hospitilization cognitive status: Alert/Oriented     Current cognitive status: Alert/Oriented      Walking or Climbing Stairs Difficulty ambulation difficulty, requires equipment     Mobility Management cane and rolling walker     Dressing/Bathing Difficulty none     Home Accessibility not wheelchair accessible;stairs to enter home     Number of Stairs, Main Entrance three     Home Layout Able to live on 1st floor     Equipment Currently Used at Home cane, straight;bedside commode;rollator;walker, rolling;wheelchair     Readmission within 30 days? No     Patient currently being followed by outpatient case management? No     Do you currently have service(s) that help you manage your care at home? No     Do you take prescription medications? Yes     Do you have prescription coverage? Yes     Do you have any problems affording any of your prescribed medications? No     Is the patient taking medications as prescribed? yes     How do you get to doctors appointments? family or friend will provide     Are you on dialysis? No     Do you take coumadin? No     Discharge Plan A Home     Discharge Plan B Home Health     DME Needed Upon Discharge  none     Discharge Plan discussed with: Patient     Discharge Barriers Identified None

## 2022-01-14 NOTE — CARE UPDATE
01/14/22 0825   PRE-TX-O2   O2 Device (Oxygen Therapy) room air   SpO2 97 %   Pulse Oximetry Type Intermittent   $ Pulse Oximetry - Multiple Charge Pulse Oximetry - Multiple   Aerosol Therapy   $ Aerosol Therapy Charges PRN treatment not required      102

## 2022-01-14 NOTE — TRANSFER OF CARE
"Anesthesia Transfer of Care Note    Patient: Froilan Ray    Procedure(s) Performed: Procedure(s) (LRB):  EGD (ESOPHAGOGASTRODUODENOSCOPY) (N/A)  COLONOSCOPY (N/A)    Patient location: GI    Anesthesia Type: general    Transport from OR: Transported from OR on room air with adequate spontaneous ventilation    Post pain: adequate analgesia    Post assessment: no apparent anesthetic complications    Post vital signs: stable    Level of consciousness: sedated    Nausea/Vomiting: no nausea/vomiting    Complications: none    Transfer of care protocol was followed      Last vitals:   Visit Vitals  BP (!) 90/53   Pulse 66   Temp 36.9 °C (98.4 °F) (Skin)   Resp 17   Ht 5' 6" (1.676 m)   Wt 77.1 kg (170 lb)   SpO2 100%   Breastfeeding No   BMI 27.44 kg/m²     "

## 2022-01-14 NOTE — SUBJECTIVE & OBJECTIVE
Interval History:  Pt seen and examined.  Was found to have oozing gastric ulcer on EGD treated with bipolar cautery. GI recommends continued admission with IV PPI twice daily, as well as iron and vitamin B12 replacement.  Pt reports fatigue has improved.  She denies any chest pain, SOB, any evidence of blood in stool overnight with colon prep.  Discussed plan for overnight surveillance and continued treatment of her blood abnormalities.  She understands    Review of Systems   Constitutional: Negative for chills, fatigue and fever.   HENT: Negative for trouble swallowing.    Respiratory: Negative for cough, shortness of breath and wheezing.    Gastrointestinal: Negative for abdominal pain, blood in stool, nausea and vomiting.   Genitourinary: Negative for dysuria.   Musculoskeletal: Negative for arthralgias.     Objective:     Vital Signs (Most Recent):  Temp: 97.7 °F (36.5 °C) (01/14/22 1239)  Pulse: 86 (01/14/22 1239)  Resp: 18 (01/14/22 1239)  BP: (!) 150/71 (01/14/22 1239)  SpO2: 99 % (01/14/22 1239) Vital Signs (24h Range):  Temp:  [97.3 °F (36.3 °C)-99.2 °F (37.3 °C)] 97.7 °F (36.5 °C)  Pulse:  [60-86] 86  Resp:  [14-20] 18  SpO2:  [94 %-100 %] 99 %  BP: ()/(53-92) 150/71     Weight: 77.1 kg (170 lb)  Body mass index is 27.44 kg/m².    Intake/Output Summary (Last 24 hours) at 1/14/2022 1314  Last data filed at 1/14/2022 1050  Gross per 24 hour   Intake 5497 ml   Output --   Net 5497 ml      Physical Exam  Vitals and nursing note reviewed.   Constitutional:       Appearance: Normal appearance.   HENT:      Head: Normocephalic and atraumatic.   Eyes:      Extraocular Movements: Extraocular movements intact.      Pupils: Pupils are equal, round, and reactive to light.   Cardiovascular:      Rate and Rhythm: Normal rate and regular rhythm.   Pulmonary:      Effort: Pulmonary effort is normal.      Breath sounds: Normal breath sounds.   Abdominal:      General: Abdomen is flat. Bowel sounds are normal. There  is no distension.      Palpations: Abdomen is soft.      Tenderness: There is no abdominal tenderness.   Musculoskeletal:         General: Normal range of motion.      Cervical back: Normal range of motion and neck supple.   Skin:     General: Skin is warm and dry.      Capillary Refill: Capillary refill takes less than 2 seconds.   Neurological:      General: No focal deficit present.      Mental Status: She is alert and oriented to person, place, and time. Mental status is at baseline.   Psychiatric:         Mood and Affect: Mood normal.         Significant Labs:   All pertinent labs within the past 24 hours have been reviewed.  CBC:   Recent Labs   Lab 01/13/22  1254 01/13/22  1547 01/14/22  0408   WBC 5.69 4.97 4.72   HGB 5.3* 4.8* 7.8*   HCT 20.4* 18.4* 26.8*    249 248     CMP:   Recent Labs   Lab 01/13/22  1254 01/13/22  1547 01/14/22  0408    141 141   K 3.8 3.8 3.7    109 108   CO2 23 22* 22*    104 95   BUN 16 19 15   CREATININE 1.0 0.8 0.8   CALCIUM 9.0 8.7 8.9   PROT  --  6.0 6.2   ALBUMIN  --  3.4* 3.5   BILITOT  --  0.3 1.0   ALKPHOS  --  60 64   AST  --  12 23   ALT  --  9* 14   ANIONGAP 11 10 11   EGFRNONAA 56* >60 >60       Significant Imaging: I have reviewed all pertinent imaging results/findings within the past 24 hours.

## 2022-01-14 NOTE — PROVATION PATIENT INSTRUCTIONS
Discharge Summary/Instructions after an Endoscopic Procedure  Patient Name: Froilan Ray  Patient MRN: [id number]  Patient YOB: 1945  Friday, January 14, 2022  Abby Landers MD  Dear patient,  As a result of recent federal legislation (The Federal Cures Act), you may   receive lab or pathology results from your procedure in your [facility]   account before your physician is able to contact you. Your physician or   their representative will relay the results to you with their   recommendations at their soonest availability.  Thank you,  RESTRICTIONS:  During your procedure today, you received medications for sedation.  These   medications may affect your judgment, balance and coordination.  Therefore,   for 24 hours, you have the following restrictions:   - DO NOT drive a car, operate machinery, make legal/financial decisions,   sign important papers or drink alcohol.    ACTIVITY:  Today: no heavy lifting, straining or running due to procedural   sedation/anesthesia.  The following day: return to full activity including work.  DIET:  Eat and drink normally unless instructed otherwise.     TREATMENT FOR COMMON SIDE EFFECTS:  - Mild abdominal pain, nausea, belching, bloating or excessive gas:  rest,   eat lightly and use a heating pad.  - Sore Throat: treat with throat lozenges and/or gargle with warm salt   water.  - Because air was used during the procedure, expelling large amounts of air   from your rectum or belching is normal.  - If a bowel prep was taken, you may not have a bowel movement for 1-3 days.    This is normal.  SYMPTOMS TO WATCH FOR AND REPORT TO YOUR PHYSICIAN:  1. Abdominal pain or bloating, other than gas cramps.  2. Chest pain.  3. Back pain.  4. Signs of infection such as: chills or fever occurring within 24 hours   after the procedure.  5. Rectal bleeding, which would show as bright red, maroon, or black stools.   (A tablespoon of blood from the rectum is not  serious, especially if   hemorrhoids are present.)  6. Vomiting.  7. Weakness or dizziness.  GO DIRECTLY TO THE NEAREST EMERGENCY ROOM IF YOU HAVE ANY OF THE FOLLOWING:      Difficulty breathing              Chills and/or fever over 101 F   Persistent vomiting and/or vomiting blood   Severe abdominal pain   Severe chest pain   Black, tarry stools   Bleeding- more than one tablespoon   Any other symptom or condition that you feel may need urgent attention  Your doctor recommends these additional instructions:  If any biopsies were taken, your doctors clinic will contact you in 1 to 2   weeks with any results.  - Await pathology results.   - Return patient to hospital ma[facility] for ongoing care.   - Advance diet as tolerated.   - PPI IV BID today  -IV iron and Vitamin B12 replacement  -Would monitor overnight, if hemoglobin stable ok to discharge in AM on BID   PPI x 8 weeks  -Repeat EGD in 8 weeks to ensure ulcer healing  -Would discharge on iron and Vitamin B12 supplementation   -Avoid NSAIDs  For questions, problems or results please call your physician - Abby Landers MD at Work:  [phone].  [facility], EMERGENCY ROOM PHONE NUMBER: [phone]  IF A COMPLICATION OR EMERGENCY SITUATION ARISES AND YOU ARE UNABLE TO REACH   YOUR PHYSICIAN - GO DIRECTLY TO THE EMERGENCY ROOM.  Abby Landers MD  1/14/2022 10:57:09 AM  This report has been verified and signed electronically.  Dear patient,  As a result of recent federal legislation (The Federal Cures Act), you may   receive lab or pathology results from your procedure in your [facility]   account before your physician is able to contact you. Your physician or   their representative will relay the results to you with their   recommendations at their soonest availability.  Thank you,  PROVATION

## 2022-01-14 NOTE — ANESTHESIA PREPROCEDURE EVALUATION
01/14/2022  Froilan Ray is a 76 y.o., female.    Patient Active Problem List   Diagnosis    Chronic pain    Encounter for postoperative wound check    Uterine mass    Closed nondisplaced intertrochanteric fracture of left femur    HTN (hypertension)    Peripheral neuropathy    Seizure disorder    Open wound of left heel    Colitis    ACP (advance care planning)    Drug-induced constipation    Disease of biliary tract    Severe anemia       Past Surgical History:   Procedure Laterality Date    BACK SURGERY      BREAST SURGERY Right     lumpectomy    ENDOSCOPIC ULTRASOUND OF UPPER GASTROINTESTINAL TRACT N/A 7/21/2020    Procedure: ULTRASOUND, UPPER GI TRACT, ENDOSCOPIC;  Surgeon: Marcio Nguyễn III, MD;  Location: [facility];  Service: Endoscopy;  Laterality: N/A;    EYE SURGERY      cataract    HYSTERECTOMY      INTRAMEDULLARY RODDING OF TROCHANTER OF FEMUR Left 12/11/2018    Procedure: INSERTION, INTRAMEDULLARY SANTOS, FEMUR, TROCHANTER;  Surgeon: Eulalio De La Cruz MD;  [facility];  Service: Orthopedics;  Laterality: Left;    SPINAL CORD STIMULATOR IMPLANT  09/18/2013    and removal    SPINE SURGERY  2006    lumbar L2-S1 decompression.    SPINE SURGERY      cervical decompression    TONSILLECTOMY          Tobacco Use:  The patient  reports that she has never smoked. She has never used smokeless tobacco.     Results for orders placed or performed during the hospital encounter of 08/22/20   EKG 12-lead    Collection Time: 08/22/20 11:54 AM    Narrative    Test Reason : R53.1,    Vent. Rate : 088 BPM     Atrial Rate : 088 BPM     P-R Int : 114 ms          QRS Dur : 078 ms      QT Int : 348 ms       P-R-T Axes : 033 011 053 degrees     QTc Int : 421 ms     Suspect unspecified pacemaker failure  Normal sinus rhythm  Normal ECG  When compared with ECG of 02-MAR-2020  09:03,  Nonspecific T wave abnormality now evident in Anterior leads  Confirmed by Rivera REID, Levi ([id number]) on 8/24/2020 9:50:25 AM    Referred By: [email or web address]   SELF           Confirmed By:Levi Stafford MD             Lab Results   Component Value Date    WBC 4.72 01/14/2022    HGB 7.8 (L) 01/14/2022    HCT 26.8 (L) 01/14/2022    MCV 74 (L) 01/14/2022     01/14/2022     BMP  Lab Results   Component Value Date     01/14/2022    K 3.7 01/14/2022     01/14/2022    CO2 22 (L) 01/14/2022    BUN 15 01/14/2022    CREATININE 0.8 01/14/2022    CALCIUM 8.9 01/14/2022    ANIONGAP 11 01/14/2022    GLU 95 01/14/2022     01/13/2022     01/13/2022       No results found for this or any previous visit.        Pre-op Assessment    I have reviewed the Patient Summary Reports.     I have reviewed the Nursing Notes. I have reviewed the NPO Status.   I have reviewed the Medications.     Review of Systems  Hematology/Oncology:         -- Anemia: Hematology Comments: Hgb 7.8 - up from 4.0, transfused 2 PRBC's.    EENT/Dental:   Eyes: (right eye blindness)  Ears General/Symptom(s) (decrease hearing )    Cardiovascular:   Hypertension    Hepatic/GI:   PUD, (in 2016)  Hepatic/GI Symptoms: (history of colitis in 2017)    Musculoskeletal:   Arthritis (cervical and lumbar DDD)   Musculoskeletal General/Symptoms: (reports limited neck movement, she is s/p neck fusion)    Neurological:   Neuromuscular Disease, (bilateral feet neuropathy) Seizures (last seizure was 3 years ago, and she is no longer on depakote.), well controlled    Psych:   Psychiatric History anxiety      Easy bruising, currently some visible in her arms.      Physical Exam  General:  Well nourished    Airway/Jaw/Neck:  Airway Findings: Mouth Opening: Normal Tongue: Normal  General Airway Assessment: Adult  Mallampati: II  TM Distance: Normal, at least 6 cm  Jaw/Neck Findings:  Neck ROM: Normal ROM     Eyes/Ears/Nose:  Eyes/Ears/Nose Findings:     Dental:  Dental Findings: (reports chipped tooth) In tact   Chest/Lungs:  Chest/Lungs Findings: Clear to auscultation, Normal Respiratory Rate     Heart/Vascular:  Heart Findings: Rate: Normal  Rhythm: Regular Rhythm  Sounds: Normal     Abdomen:  Abdomen Findings:     Musculoskeletal:  Musculoskeletal Findings:    Skin:  Skin Findings:     Mental Status:  Mental Status Findings:  Cooperative, Alert and Oriented         Anesthesia Plan  Type of Anesthesia, risks & benefits discussed:  Anesthesia Type:  general    Patient's Preference:   Plan Factors:          Intra-op Monitoring Plan: standard ASA monitors  Intra-op Monitoring Plan Comments:   Post Op Pain Control Plan:   Post Op Pain Control Plan Comments:     Induction:   IV  Beta Blocker:  Patient is on a Beta-Blocker and has received one dose within the past 24 hours (No further documentation required).       Informed Consent: Patient understands risks and agrees with Anesthesia plan.  Questions answered. Anesthesia consent signed with patient.  ASA Score: 3     Day of Surgery Review of History & Physical: I have interviewed and examined the patient. I have reviewed the patient's H&P dated:    H&P update referred to the provider.         Ready For Surgery From Anesthesia Perspective.

## 2022-01-14 NOTE — PLAN OF CARE
Plan of care reviewed with patient. Patient verbalized complete understanding. Pt awake, alert, and oriented. Pt arrived on floor around 2100. Pt complained of pain but controlled with PRN pain medication. Pt second blood transfusion done, tolerated well. Pt up to bedside commode multiple times during the night. Pt drinking bowel prep and BM are getting clearer. Pt on tele [id number] NSR.  All fall precautions maintained, bed in lowest position, locked, call light within reach. Side rails up times 2. Slip resistant socks maintained.

## 2022-01-14 NOTE — ANESTHESIA POSTPROCEDURE EVALUATION
Anesthesia Post Evaluation    Patient: Froilan Ray    Procedure(s) Performed: Procedure(s) (LRB):  EGD (ESOPHAGOGASTRODUODENOSCOPY) (N/A)  COLONOSCOPY (N/A)    Final Anesthesia Type: general      Patient location during evaluation: PACU  Patient participation: Yes- Able to Participate  Level of consciousness: sedated and awake  Post-procedure vital signs: reviewed and stable  Pain management: adequate  Airway patency: patent    PONV status at discharge: No PONV  Anesthetic complications: no      Cardiovascular status: hypertensive and blood pressure returned to baseline  Respiratory status: spontaneous ventilation  Hydration status: euvolemic  Follow-up not needed.          Vitals Value Taken Time   /71 01/14/22 1239   Temp 36.5 °C (97.7 °F) 01/14/22 1239   Pulse 86 01/14/22 1239   Resp 18 01/14/22 1239   SpO2 99 % 01/14/22 1239         Event Time   Out of Recovery 01/14/2022 11:46:00         Pain/Duarte Score: Pain Rating Prior to Med Admin: 7 (1/14/2022 12:21 PM)  Pain Rating Post Med Admin: 4 (1/14/2022  4:35 AM)  Duarte Score: 10 (1/14/2022 11:32 AM)

## 2022-01-14 NOTE — CONSULTS
[facility]     CC: Anemia    HPI 76 y.o. female with history of epilepsy, chronic back pain and PUD, presents for acute on chronic, severe, microcytic anemia not associated with overt GI bleeding, abdominal pain, dysphagia, or change in bowel habits. She takes at least 4 Aleve a day for chronic back pain. She has been told she is anemic and required a blood transfusion ~ 5 years ago but does not take iron.  She believes she had an EGD and colonoscopy > 10 years ago, unsure if she had polyps. She has an incomplete colonoscopy performed by Dr. Silver in 2020, due to poor prep. She denies family history of GI malignancy.     She received 2 units of blood overnight and feels improved.     Past Medical History:   Diagnosis Date    Anemia     Arthritis     Bleeding ulcer 07/2016    Chronic pain     DDD (degenerative disc disease), cervical     DDD (degenerative disc disease), lumbar     Depression     Disc degeneration, lumbosacral     Diverticulitis     Encounter for blood transfusion     Hypertension     IBS (irritable bowel syndrome)     Neuropathy of both feet     Seizures     none  since 2017    Umbilical hernia 2020       Past Surgical History:   Procedure Laterality Date    BACK SURGERY      BREAST SURGERY Right     lumpectomy    ENDOSCOPIC ULTRASOUND OF UPPER GASTROINTESTINAL TRACT N/A 7/21/2020    Procedure: ULTRASOUND, UPPER GI TRACT, ENDOSCOPIC;  Surgeon: Marcio Nguyễn III, MD;  Location: [facility];  Service: Endoscopy;  Laterality: N/A;    EYE SURGERY      cataract    HYSTERECTOMY      INTRAMEDULLARY RODDING OF TROCHANTER OF FEMUR Left 12/11/2018    Procedure: INSERTION, INTRAMEDULLARY SANTOS, FEMUR, TROCHANTER;  Surgeon: Eulalio De La Cruz MD;  Location: [facility];  Service: Orthopedics;  Laterality: Left;    SPINAL CORD STIMULATOR IMPLANT  09/18/2013    and removal    SPINE SURGERY  2006    lumbar L2-S1 decompression.    SPINE SURGERY      cervical decompression     "TONSILLECTOMY         Social History     Tobacco Use    Smoking status: Never Smoker    Smokeless tobacco: Never Used   Substance Use Topics    Alcohol use: No    Drug use: No       Family History   Problem Relation Age of Onset    Diabetes Mother     Hypertension Mother     Irritable bowel syndrome Mother     Diabetes Father         insulin dependent    Hypertension Father     Heart disease Father     Coronary artery disease Father     Depression Father     Diabetes Sister     Diabetes Brother     COPD Brother        Allergies and Medications reviewed     Review of Systems  General ROS: negative for - chills, fever; + intentional weight loss  Psychological ROS: negative for - hallucination, depression or suicidal ideation  Ophthalmic ROS: negative for - blurry vision, photophobia or eye pain  ENT ROS: negative for - epistaxis, sore throat or rhinorrhea  Respiratory ROS: no cough, shortness of breath, or wheezing  Cardiovascular ROS: no chest pain or dyspnea on exertion  Gastrointestinal ROS: no blood in stool, no vomiting, no dysphagia  Genito-Urinary ROS: no dysuria, trouble voiding, or hematuria  Musculoskeletal ROS: negative for - arthralgia, myalgia ; +  Weakness + fatigue  Neurological ROS: no syncope or seizures; no ataxia  Dermatological ROS: negative for pruritis, rash and jaundice    Physical Examination  BP (!) 140/66   Pulse 77   Temp 98.4 °F (36.9 °C)   Resp 18   Ht 5' 6" (1.676 m)   Wt 77.1 kg (170 lb)   SpO2 97%   BMI 27.44 kg/m²   General appearance: alert, cooperative, no distress, pale  HENT: Normocephalic, atraumatic, neck symmetrical, no nasal discharge   Eyes: conjunctivae/corneas clear, PERRL, EOM's intact, sclera anicteric  Lungs: clear to auscultation bilaterally, no dullness to percussion bilaterally, symmetric expansion, breathing unlabored  Heart: regular rate and rhythm without rub; no displacement of the PMI   Abdomen: soft, nontender, nondistended, BS active,no " masses appreciated   Extremities: extremities symmetric; no clubbing, cyanosis, or edema  Integument: Skin color, texture, turgor normal; no rashes; hair distrubution normal, no jaundice  Neurologic: Alert and oriented X 3, no tremor  Psychiatric: no pressured speech;flat affect; no evidence of impaired cognition, no anxiety/depression     Labs:  Lab Results   Component Value Date    WBC 4.72 01/14/2022    HGB 7.8 (L) 01/14/2022    HCT 26.8 (L) 01/14/2022    MCV 74 (L) 01/14/2022     01/14/2022       CMP  Sodium   Date Value Ref Range Status   01/14/2022 141 136 - 145 mmol/L Final     Potassium   Date Value Ref Range Status   01/14/2022 3.7 3.5 - 5.1 mmol/L Final     Chloride   Date Value Ref Range Status   01/14/2022 108 95 - 110 mmol/L Final     CO2   Date Value Ref Range Status   01/14/2022 22 (L) 23 - 29 mmol/L Final     Glucose   Date Value Ref Range Status   01/14/2022 95 70 - 110 mg/dL Final     BUN   Date Value Ref Range Status   01/14/2022 15 8 - 23 mg/dL Final     Creatinine   Date Value Ref Range Status   01/14/2022 0.8 0.5 - 1.4 mg/dL Final     Calcium   Date Value Ref Range Status   01/14/2022 8.9 8.7 - 10.5 mg/dL Final     Total Protein   Date Value Ref Range Status   01/14/2022 6.2 6.0 - 8.4 g/dL Final     Albumin   Date Value Ref Range Status   01/14/2022 3.5 3.5 - 5.2 g/dL Final     Total Bilirubin   Date Value Ref Range Status   01/14/2022 1.0 0.1 - 1.0 mg/dL Final     Comment:     For infants and newborns, interpretation of results should be based  on gestational age, weight and in agreement with clinical  observations.    Premature Infant recommended reference ranges:  Up to 24 hours.............<8.0 mg/dL  Up to 48 hours............<12.0 mg/dL  3-5 days..................<15.0 mg/dL  6-29 days.................<15.0 mg/dL       Alkaline Phosphatase   Date Value Ref Range Status   01/14/2022 64 55 - 135 U/L Final     AST   Date Value Ref Range Status   01/14/2022 23 10 - 40 U/L Final     ALT    Date Value Ref Range Status   01/14/2022 14 10 - 44 U/L Final     Anion Gap   Date Value Ref Range Status   01/14/2022 11 8 - 16 mmol/L Final     eGFR if    Date Value Ref Range Status   01/14/2022 >60 >60 mL/min/1.73 m^2 Final     eGFR if non    Date Value Ref Range Status   01/14/2022 >60 >60 mL/min/1.73 m^2 Final     Comment:     Calculation used to obtain the estimated glomerular filtration  rate (eGFR) is the CKD-EPI equation.        -Vitamin B12- 182  -Iron- 14, TIBC- 469    November 2020- Hgb- 10.4, MCV 84    Imaging:  CT abdomen/pelvis November 2020 was independently visualized and reviewed by me and showed no acute findings, colonic diverticulosis, renal stone    Assessment:   76 y.o. female with history of PUD and chronic NSAID use presents with severe anemia due to FAISAL and Vitamin B12 deficiency. She does not have overt bleeding and responded appropriately to pRBC transfusion.     Plan:  -EGD and colonoscopy today  -Replace Vitamin B12 and Iron    Abby Landers MD  [facility]  [address], [address]  [place] [address]  Office: [phone]  Fax: [phone]

## 2022-01-14 NOTE — ASSESSMENT & PLAN NOTE
Patient with historical gastric ulcer bleeding: Acute on chronic  -Treated with cautery  -Received 2 units RBCs  -Monitor CBC  -IV PPI BID  -Plan for d/c tomorrow if remains stable on continued PPI treatment.

## 2022-01-14 NOTE — CARE UPDATE
01/13/22 2011   Patient Assessment/Suction   Level of Consciousness (AVPU) alert   Respiratory Effort Normal;Unlabored   Expansion/Accessory Muscles/Retractions expansion symmetric;no retractions;no use of accessory muscles   PRE-TX-O2   O2 Device (Oxygen Therapy) room air   SpO2 98 %   Pulse Oximetry Type Intermittent   Aerosol Therapy   $ Aerosol Therapy Charges PRN treatment not required   Respiratory Treatment Status (SVN) PRN treatment not required

## 2022-01-14 NOTE — PLAN OF CARE
Patient brought to room 401 via wheelchair by nurse. She reports being hungry and wants pain medicine for her back. Family in room. Tavo ROMERO notified patient is in room.

## 2022-01-14 NOTE — PLAN OF CARE
01/14/22 0839   MARC Message   Medicare Outpatient and Observation Notification regarding financial responsibility Explained to patient/caregiver;Signed/date by patient/caregiver   Date MARC was signed 01/14/22   Time MARC was signed 0839

## 2022-01-14 NOTE — ASSESSMENT & PLAN NOTE
Patient's anemia is currently uncontrolled. Has recieved 2 units of PRBCs on 1/13/22. Etiology likely d/t GI bleed, FAISAL, B12 deficiency.  Current CBC reviewed-   Lab Results   Component Value Date    HGB 7.8 (L) 01/14/2022    HCT 26.8 (L) 01/14/2022     -transfused 2 units packed red blood cells with Lasix after each unit  -CBC improved  -follow CBC closely  -monitor on telemetry  -discussed with GI who we have consulted for severe FAISAL.    -EGD + active bleeding ulcer: treated.

## 2022-01-15 VITALS
RESPIRATION RATE: 16 BRPM | BODY MASS INDEX: 27.32 KG/M2 | WEIGHT: 170 LBS | TEMPERATURE: 98 F | SYSTOLIC BLOOD PRESSURE: 164 MMHG | HEART RATE: 87 BPM | DIASTOLIC BLOOD PRESSURE: 77 MMHG | OXYGEN SATURATION: 96 % | HEIGHT: 66 IN

## 2022-01-15 LAB
ALBUMIN SERPL BCP-MCNC: 2.8 G/DL (ref 3.5–5.2)
ALP SERPL-CCNC: 58 U/L (ref 55–135)
ALT SERPL W/O P-5'-P-CCNC: 12 U/L (ref 10–44)
ANION GAP SERPL CALC-SCNC: 7 MMOL/L (ref 8–16)
AST SERPL-CCNC: 11 U/L (ref 10–40)
BASOPHILS # BLD AUTO: 0.05 K/UL (ref 0–0.2)
BASOPHILS NFR BLD: 1.2 % (ref 0–1.9)
BILIRUB SERPL-MCNC: 0.4 MG/DL (ref 0.1–1)
BUN SERPL-MCNC: 9 MG/DL (ref 8–23)
CALCIUM SERPL-MCNC: 8.5 MG/DL (ref 8.7–10.5)
CHLORIDE SERPL-SCNC: 112 MMOL/L (ref 95–110)
CO2 SERPL-SCNC: 24 MMOL/L (ref 23–29)
CREAT SERPL-MCNC: 0.7 MG/DL (ref 0.5–1.4)
DIFFERENTIAL METHOD: ABNORMAL
EOSINOPHIL # BLD AUTO: 0.2 K/UL (ref 0–0.5)
EOSINOPHIL NFR BLD: 5 % (ref 0–8)
ERYTHROCYTE [DISTWIDTH] IN BLOOD BY AUTOMATED COUNT: 18.3 % (ref 11.5–14.5)
EST. GFR  (AFRICAN AMERICAN): >60 ML/MIN/1.73 M^2
EST. GFR  (NON AFRICAN AMERICAN): >60 ML/MIN/1.73 M^2
GLUCOSE SERPL-MCNC: 92 MG/DL (ref 70–110)
HCT VFR BLD AUTO: 25.5 % (ref 37–48.5)
HGB BLD-MCNC: 7.4 G/DL (ref 12–16)
IMM GRANULOCYTES # BLD AUTO: 0.01 K/UL (ref 0–0.04)
IMM GRANULOCYTES NFR BLD AUTO: 0.2 % (ref 0–0.5)
LYMPHOCYTES # BLD AUTO: 1.6 K/UL (ref 1–4.8)
LYMPHOCYTES NFR BLD: 37.9 % (ref 18–48)
MCH RBC QN AUTO: 22 PG (ref 27–31)
MCHC RBC AUTO-ENTMCNC: 29 G/DL (ref 32–36)
MCV RBC AUTO: 76 FL (ref 82–98)
MONOCYTES # BLD AUTO: 0.5 K/UL (ref 0.3–1)
MONOCYTES NFR BLD: 12.6 % (ref 4–15)
NEUTROPHILS # BLD AUTO: 1.8 K/UL (ref 1.8–7.7)
NEUTROPHILS NFR BLD: 43.1 % (ref 38–73)
NRBC BLD-RTO: 0 /100 WBC
PLATELET # BLD AUTO: 208 K/UL (ref 150–450)
PMV BLD AUTO: 10.8 FL (ref 9.2–12.9)
POTASSIUM SERPL-SCNC: 3.9 MMOL/L (ref 3.5–5.1)
PROT SERPL-MCNC: 5 G/DL (ref 6–8.4)
RBC # BLD AUTO: 3.36 M/UL (ref 4–5.4)
SODIUM SERPL-SCNC: 143 MMOL/L (ref 136–145)
WBC # BLD AUTO: 4.22 K/UL (ref 3.9–12.7)

## 2022-01-15 PROCEDURE — 80053 COMPREHEN METABOLIC PANEL: CPT | Performed by: NURSE PRACTITIONER

## 2022-01-15 PROCEDURE — 85025 COMPLETE CBC W/AUTO DIFF WBC: CPT | Performed by: NURSE PRACTITIONER

## 2022-01-15 PROCEDURE — 94761 N-INVAS EAR/PLS OXIMETRY MLT: CPT

## 2022-01-15 PROCEDURE — 63600175 PHARM REV CODE 636 W HCPCS: Performed by: NURSE PRACTITIONER

## 2022-01-15 PROCEDURE — 63600175 PHARM REV CODE 636 W HCPCS: Performed by: INTERNAL MEDICINE

## 2022-01-15 PROCEDURE — 99900035 HC TECH TIME PER 15 MIN (STAT)

## 2022-01-15 PROCEDURE — 36415 COLL VENOUS BLD VENIPUNCTURE: CPT | Performed by: NURSE PRACTITIONER

## 2022-01-15 PROCEDURE — 25000003 PHARM REV CODE 250: Performed by: NURSE PRACTITIONER

## 2022-01-15 PROCEDURE — C9113 INJ PANTOPRAZOLE SODIUM, VIA: HCPCS | Performed by: INTERNAL MEDICINE

## 2022-01-15 RX ORDER — ASCORBIC ACID 250 MG
250 TABLET ORAL DAILY
Qty: 30 TABLET | Refills: 0 | Status: SHIPPED | OUTPATIENT
Start: 2022-01-15 | End: 2023-12-06

## 2022-01-15 RX ORDER — LANOLIN ALCOHOL/MO/W.PET/CERES
1 CREAM (GRAM) TOPICAL
Qty: 30 TABLET | Refills: 0 | Status: SHIPPED | OUTPATIENT
Start: 2022-01-15 | End: 2022-06-30 | Stop reason: SDUPTHER

## 2022-01-15 RX ORDER — PNV NO.95/FERROUS FUM/FOLIC AC 28MG-0.8MG
100 TABLET ORAL DAILY
Qty: 30 TABLET | Refills: 0 | Status: SHIPPED | OUTPATIENT
Start: 2022-01-15 | End: 2023-12-06

## 2022-01-15 RX ORDER — PANTOPRAZOLE SODIUM 40 MG/1
40 TABLET, DELAYED RELEASE ORAL 2 TIMES DAILY
Qty: 60 TABLET | Refills: 4 | Status: ON HOLD | OUTPATIENT
Start: 2022-01-15 | End: 2022-06-10 | Stop reason: HOSPADM

## 2022-01-15 RX ADMIN — IRON SUCROSE 200 MG: 20 INJECTION, SOLUTION INTRAVENOUS at 09:01

## 2022-01-15 RX ADMIN — PANTOPRAZOLE SODIUM 40 MG: 40 INJECTION, POWDER, LYOPHILIZED, FOR SOLUTION INTRAVENOUS at 09:01

## 2022-01-15 RX ADMIN — OXYCODONE AND ACETAMINOPHEN 1 TABLET: 10; 325 TABLET ORAL at 09:01

## 2022-01-15 RX ADMIN — OXYCODONE AND ACETAMINOPHEN 1 TABLET: 10; 325 TABLET ORAL at 12:01

## 2022-01-15 RX ADMIN — CYCLOBENZAPRINE HYDROCHLORIDE 10 MG: 5 TABLET, FILM COATED ORAL at 09:01

## 2022-01-15 RX ADMIN — PREGABALIN 200 MG: 75 CAPSULE ORAL at 09:01

## 2022-01-15 RX ADMIN — CYCLOBENZAPRINE HYDROCHLORIDE 10 MG: 5 TABLET, FILM COATED ORAL at 01:01

## 2022-01-15 RX ADMIN — ESCITALOPRAM OXALATE 20 MG: 10 TABLET, FILM COATED ORAL at 09:01

## 2022-01-15 NOTE — DISCHARGE INSTRUCTIONS
You should take protonix twice daily for 8 weeks to help your stomach  Heal.  You will need daily iron, b12, and vitamin c supplementation for your anemia.  Please seek emergent medical attention for any signs of blood loss or worsening anemia.    Thank you for choosing [facility] for your medical care. The primary doctor who is taking care of you at the time of your discharge is Isaac Valenzuela MD.     You were admitted to the hospital with Severe anemia.     Please note your discharge instructions, including diet/activity restrictions, follow-up appointments, and medication changes.  If you have any questions about your medical issues, prescriptions, or any other questions, please feel free to contact the [facility] Medicine Dept at [phone] and we will help.    If you are previously with Home health, outpatient PT/OT or under a therapy program, you are cleared to return to those programs.    Please direct all long term medication refills and follow up to your primary care provider, Quinn Rangel MD. Thank you again for letting us take care of your health care needs.    Please note the following discharge instructions per your discharging physician-  Sammie Tamez NP

## 2022-01-15 NOTE — DISCHARGE SUMMARY
[facility] Medicine  Discharge Summary      Patient Name: Froilan Ray  MRN: [id number]  Patient Class: IP- Inpatient  Admission Date: 1/13/2022  Hospital Length of Stay: 1 days  Discharge Date and Time:  01/15/2022 11:17 AM  Attending Physician: Tia Mccartney MD   Discharging Provider: Sammie Tamez NP  Primary Care Provider: Quinn Rangel MD      HPI:   Froilan Ray is a 76-year-old female with PMHx significant for HTN, anemia, chronic pain maintained on opiate therapy, and seizure disorder.  She presented the ED at the recommendation of her for sedation for abnormal labs.  She was found to have profound anemia on her routine labs.  She endorses months fatigue and weakness.  She denies any chest pain, SOB, nausea, vomiting, abdominal pain, black/tarry stool, hematemesis, or juan bloody stool.  She reports Hx of anemia and reports at 1 time she was told she had bleeding ulcers.  She is unable to quantify the time between her last endoscopic evaluation.  She is unsure that she has ever had a colonoscopy due to poor prep in the past when attempted.  She is not actively taking any iron therapy.  She does endorse daily use of Aleve -2 tabs daily.  She denies any further NSAID use.  Her workup in the ED was significant for profound anemia.  She was ordered for blood transfusion.  Case was discussed with GI given significant microcytic anemia concerning for iron deficiency anemia with possible bleeding etiology.  She will prep tonight for endoscopic evaluation tomorrow.  Pt was placed in observation for further evaluation management.  Other pertinent medical Hx as below:      Procedure(s) (LRB):  EGD (ESOPHAGOGASTRODUODENOSCOPY) (N/A)  COLONOSCOPY (N/A)      Hospital Course:   Pt was monitored closely during her stay.  She was transfused 2 units of packed red blood cells with good response.  She was prepped for colonoscopy and underwent both colonoscopy and EGD on 01/14  with Dr. Rojo.  She was found that have multiple gastric ulcers, one with small amount of oozing treated with cautery as well as colonoscopy findings of diverticulosis and hemorrhoids.  She was given iron infusion and B12 supplementation.  Her H&H remained stable.  She was continued on PPI twice daily postprocedure.  She will discharge home and continue b.i.d. Protonix for 8 weeks, as well as daily iron and vitamin B12 supplementation.  She will follow-up with her PCP next week and can have repeat labs at that time.  She will follow-up with Dr. rojo and 8 weeks.  Return precautions were discussed.  Pt and  verbalized understanding and agreement with discharge plan.    Physical Exam:  HRRR, lungs clear to auscultation  Abd soft, non-tender         Goals of Care Treatment Preferences:  Code Status: Full Code      Consults:   Consults (From admission, onward)        Status Ordering Provider     Inpatient consult to Gastroenterology  Once        Provider:  MD Azeem Bertrnad JESSICA M.          No new Assessment & Plan notes have been filed under this hospital service since the last note was generated.  Service: Hospital Medicine    Final Active Diagnoses:    Diagnosis Date Noted POA    PRINCIPAL PROBLEM:  Severe anemia [D64.9] 01/13/2022 Yes    Chronic gastric ulcer with bleeding [K25.4] 01/14/2022 Yes    Drug-induced constipation [K59.03] 01/10/2020 Yes    HTN (hypertension) [I10] 12/11/2018 Yes    Seizure disorder [G40.909] 12/11/2018 Yes    Chronic pain [G89.29] 08/27/2013 Yes      Problems Resolved During this Admission:       Discharged Condition: good    Disposition: Home or Self Care    Follow Up:   Follow-up Information     Quinn Rangel MD In 1 week.    Specialty: Internal Medicine  Contact information:  [address]  [place] [address]  [phone]             Abby Landers MD In 8 weeks.    Specialties: Gastroenterology, Internal Medicine  Contact  information:  [address] [facility]  [address]  [place] [address]  [phone]                       Patient Instructions:      Diet Cardiac     Notify your health care provider if you experience any of the following:  persistent nausea and vomiting or diarrhea     Notify your health care provider if you experience any of the following:  severe uncontrolled pain     Notify your health care provider if you experience any of the following:  redness, tenderness, or signs of infection (pain, swelling, redness, odor or green/yellow discharge around incision site)     Notify your health care provider if you experience any of the following:  difficulty breathing or increased cough     Notify your health care provider if you experience any of the following:  increased confusion or weakness     Notify your health care provider if you experience any of the following:  persistent dizziness, light-headedness, or visual disturbances     Activity as tolerated       Significant Diagnostic Studies: Labs:   CMP   Recent Labs   Lab 01/13/22  1547 01/14/22  0408 01/15/22  0341    141 143   K 3.8 3.7 3.9    108 112*   CO2 22* 22* 24    95 92   BUN 19 15 9   CREATININE 0.8 0.8 0.7   CALCIUM 8.7 8.9 8.5*   PROT 6.0 6.2 5.0*   ALBUMIN 3.4* 3.5 2.8*   BILITOT 0.3 1.0 0.4   ALKPHOS 60 64 58   AST 12 23 11   ALT 9* 14 12   ANIONGAP 10 11 7*   ESTGFRAFRICA >60 >60 >60   EGFRNONAA >60 >60 >60    and CBC   Recent Labs   Lab 01/13/22  1547 01/13/22  1547 01/14/22  0408 01/14/22  0408 01/15/22  0341   WBC 4.97  --  4.72  --  4.22   HGB 4.8*  --  7.8*  --  7.4*   HCT 18.4*   < > 26.8*   < > 25.5*     --  248  --  208    < > = values in this interval not displayed.       Pending Diagnostic Studies:     Procedure Component Value Units Date/Time    Specimen to Pathology, Surgery Gastrointestinal tract [[id number]] Collected: 01/14/22 1052    Order Status: Sent Lab Status: In process Updated: 01/14/22 6097          Medications:  Reconciled Home Medications:      Medication List      START taking these medications    ascorbic acid (vitamin C) 250 MG tablet  Commonly known as: VITAMIN C  Take 1 tablet (250 mg total) by mouth once daily.     cyanocobalamin 100 MCG tablet  Commonly known as: VITAMIN B-12  Take 1 tablet (100 mcg total) by mouth once daily.     ferrous sulfate Tab tablet  Commonly known as: FEOSOL  Take 1 tablet (1 each total) by mouth daily with breakfast.     pantoprazole 40 MG tablet  Commonly known as: PROTONIX  Take 1 tablet (40 mg total) by mouth 2 (two) times daily.        CONTINUE taking these medications    acetaminophen 325 MG tablet  Commonly known as: TYLENOL  Take 650 mg by mouth once.     amlodipine-benazepril 5-20 mg 5-20 mg per capsule  Commonly known as: LOTREL  Take 1 capsule by mouth once daily.     cyclobenzaprine 10 MG tablet  Commonly known as: FLEXERIL  Take 10 mg by mouth 3 (three) times daily.     EScitalopram oxalate 20 MG tablet  Commonly known as: LEXAPRO  TAKE 1 TABLET(20 MG) BY MOUTH EVERY DAY     famotidine 20 MG tablet  Commonly known as: PEPCID  Take 20 mg by mouth daily as needed for Heartburn.     furosemide 40 MG tablet  Commonly known as: LASIX  Take 40 mg by mouth once daily.     oxyCODONE-acetaminophen  mg per tablet  Commonly known as: PERCOCET  Take 1 tablet by mouth every 6 (six) hours as needed for Pain.     pregabalin 200 MG Cap  Commonly known as: LYRICA  Take 1 capsule (200 mg total) by mouth 3 (three) times daily.            Indwelling Lines/Drains at time of discharge:   Lines/Drains/Airways     None                 Time spent on the discharge of patient: 36 minutes         Sammie Tamez NP  Department of Hospital Medicine  [facility]

## 2022-01-15 NOTE — CARE UPDATE
01/14/22 1957   Patient Assessment/Suction   Level of Consciousness (AVPU) alert   Respiratory Effort Normal;Unlabored   Expansion/Accessory Muscles/Retractions expansion symmetric;no retractions;no use of accessory muscles   PRE-TX-O2   O2 Device (Oxygen Therapy) room air   SpO2 98 %   Pulse Oximetry Type Intermittent   Pulse 83   Resp 18   Aerosol Therapy   $ Aerosol Therapy Charges PRN treatment not required   Respiratory Treatment Status (SVN) PRN treatment not required

## 2022-01-15 NOTE — NURSING
piv removed. Tele removed. Dc instructions explained, pt v/u. rx's at pharmacy. Aware of f/u's. All questions answered.

## 2022-01-15 NOTE — PLAN OF CARE
Pt cleared for discharge home from Case Management     01/15/22 1030   Final Note   Assessment Type Final Discharge Note   Anticipated Discharge Disposition Home

## 2022-01-15 NOTE — PLAN OF CARE
POC reviewed Patient AAOX4 VSS afebrile pain managed with PRN medications cardiac monitoring continued up with assistance to BSC no acute distress noted at this time.

## 2022-01-15 NOTE — HOSPITAL COURSE
Pt was monitored closely during her stay.  She was transfused 2 units of packed red blood cells with good response.  She was prepped for colonoscopy and underwent both colonoscopy and EGD on 01/14 with Dr. Corey.  She was found that have multiple gastric ulcers, one with small amount of oozing treated with cautery as well as colonoscopy findings of diverticulosis and hemorrhoids.  She was given iron infusion and B12 supplementation.  Her H&H remained stable.  She was continued on PPI twice daily postprocedure.  She will discharge home and continue b.i.d. Protonix for 8 weeks, as well as daily iron and vitamin B12 supplementation.  She will follow-up with her PCP next week and can have repeat labs at that time.  She will follow-up with Dr. corey and 8 weeks.  Return precautions were discussed.  Pt and  verbalized understanding and agreement with discharge plan.    Physical Exam:  HRRR, lungs clear to auscultation  Abd soft, non-tender

## 2022-01-15 NOTE — PLAN OF CARE
Plan of care reviewed with patient. Medicated for back pain twice this shift, moderate relief obtained. SR on telemetry. Remains free from falls/injury. Instructed to call for assistance as needed during night, verbalized understanding. Call light in reach, bed alarm on .

## 2022-01-18 ENCOUNTER — PATIENT OUTREACH (OUTPATIENT)
Dept: ADMINISTRATIVE | Facility: CLINIC | Age: 77
End: 2022-01-18
Payer: MEDICARE

## 2022-01-18 ENCOUNTER — TELEPHONE (OUTPATIENT)
Dept: MEDSURG UNIT | Facility: HOSPITAL | Age: 77
End: 2022-01-18
Payer: MEDICARE

## 2022-01-18 NOTE — PATIENT INSTRUCTIONS
Patient Education       Anemia Caused by Low Iron Discharge Instructions, Adult   About this topic   Anemia means you have too few red blood cells. Your red blood cells are the cells that carry oxygen to all parts of your body.  The most common reason for anemia is a low level of iron in your body. Your body uses iron to make red blood cells. You may have low iron because you have lost blood over time or your body may have trouble taking in the iron from the food you eat. The doctors will work to find out what is causing your anemia and help you get more iron if needed.  What care is needed at home?   · Ask your doctor what you need to do when you go home. Make sure you ask questions if you do not understand what the doctor says.  · If the doctor suggested you take iron pills, be sure you know the strength and how often to take them. The best way to take iron is once every other day or Monday, Wednesday, and Friday of each week. Be sure you can keep track of when you are to take your iron. You may have to take the pills for a few months.  · Store your iron pills safely if you have them. Take extra care that children cannot get them. A child can get seriously poisoned if they take iron pills on accident. If a child takes your iron pills, call poison control right away.  · Feeling tired or short of breath is a sign of anemia. You may need to rest more often until your red blood cell counts increase. You may also have other symptoms like having very little energy, restless legs, and craving ice. It may take a few weeks until the anemia starts to get better.  What follow-up care is needed?   · Your doctor may ask you to make visits to the office to check on your progress. Be sure to keep these visits.  · You may need to have some blood tests.  · If your red blood cell counts are too low, you may need to get a blood transfusion.  · You may need to have tests to see if your body is absorbing all the iron you are taking  in.  · You may need other tests to see if you are bleeding slowly from inside your body.  What drugs may be needed?   The doctor may order drugs to:  · Replace the iron in your body. These are iron supplements.  · Help your body absorb iron. This is vitamin C.  · Stool softeners to help control possible constipation.  Take your drug as ordered by your doctor.  What changes to diet are needed?   Eat food rich in iron, such as:  · Meats and proteins like: Eggs (especially egg yolks), liver, lean red meat (especially beef), oysters, clams, poultry, salmon, tuna, shrimp, tofu  · Iron-fortified breads and grains like: Breads, pastas, cornmeal, white rice, cereals, and whole grains  · Fruits like: Dried fruits such as prunes, raisins, and apricots; prune juice  · Vegetables like: Spinach and other dark green leafy vegetables; dried beans; white, red, and baked beans; soybeans; peas; lentils; chickpeas  Also eat foods rich in vitamin C such as:  · Fruits like: Oranges, tangerines, kiwi, strawberries, cantaloupe  · Vegetables like: Broccoli, cauliflower, bell peppers, tomatoes, cabbage, sweet potatoes  You may not absorb as much iron from your food if you drink black or pekoe tea or excessive amounts of milk. Limit drinking these with meals. Drugs for heartburn may also limit how much iron your body takes in. Talk to your doctor if you take these kind of drugs.     What can be done to prevent this health problem?   · Make sure you eat foods rich in iron each day. Also, make sure you are getting vitamin C to help the body take up iron.  · Ask if you should take an iron supplement.  When do I need to call the doctor?   · You develop chest pain or severe trouble breathing.  · You throw up blood or something that looks like coffee grounds.  · Your stools are black or tar-colored.  · You feel weak or you pass out.  Helpful tips   Do not take more iron pills than ordered. Large amounts of iron can be harmful. Take extra iron only  as ordered by your doctor.  Teach Back: Helping You Understand   The Teach Back Method helps you understand the information we are giving you. After you talk with the staff, tell them in your own words what you learned. This helps to make sure the staff has described each thing clearly. It also helps to explain things that may have been confusing. Before going home, make sure you can do these:  · I can tell you about my condition.  · I can tell you what changes I need to make with my diet or drugs.  · I can tell you what I will do if I have blood in my stool or if my stool is black or tarry looking.  Where can I learn more?   American Society of Hematology  https://www.hematology.org/education/patients/anemia/iron-deficiency   NHS Choices  https://www.nhs.uk/conditions/iron-deficiency-anaemia/   Last Reviewed Date   2021-06-08  Consumer Information Use and Disclaimer   This information is not specific medical advice and does not replace information you receive from your health care provider. This is only a brief summary of general information. It does NOT include all information about conditions, illnesses, injuries, tests, procedures, treatments, therapies, discharge instructions or life-style choices that may apply to you. You must talk with your health care provider for complete information about your health and treatment options. This information should not be used to decide whether or not to accept your health care providers advice, instructions or recommendations. Only your health care provider has the knowledge and training to provide advice that is right for you.  Copyright   Copyright © 2021 UpToDate, Inc. and its affiliates and/or licensors. All rights reserved.  Alexia teaching reviewed with Froilan Ray . She verbalized understanding.    Education was provided based on the patient's discharge diagnosis using the attached Alexia patient education as a reference.

## 2022-01-18 NOTE — PROGRESS NOTES
C3 nurse spoke with Froilan Ray for a TCC post hospital discharge follow up call. The patient has a scheduled HOSFU appointment with Dr. Claire MD on 1/20/2022 @ 4:00 pm.

## 2022-01-21 LAB
FINAL PATHOLOGIC DIAGNOSIS: NORMAL
GROSS: NORMAL
Lab: NORMAL
MICROSCOPIC EXAM: NORMAL

## 2022-01-25 ENCOUNTER — LAB VISIT (OUTPATIENT)
Dept: PRIMARY CARE CLINIC | Facility: CLINIC | Age: 77
End: 2022-01-25
Payer: MEDICARE

## 2022-01-25 DIAGNOSIS — Z01.818 PRE-OP TESTING: ICD-10-CM

## 2022-01-25 PROCEDURE — U0003 INFECTIOUS AGENT DETECTION BY NUCLEIC ACID (DNA OR RNA); SEVERE ACUTE RESPIRATORY SYNDROME CORONAVIRUS 2 (SARS-COV-2) (CORONAVIRUS DISEASE [COVID-19]), AMPLIFIED PROBE TECHNIQUE, MAKING USE OF HIGH THROUGHPUT TECHNOLOGIES AS DESCRIBED BY CMS-2020-01-R: HCPCS | Performed by: ANESTHESIOLOGY

## 2022-01-25 PROCEDURE — U0005 INFEC AGEN DETEC AMPLI PROBE: HCPCS | Performed by: ANESTHESIOLOGY

## 2022-01-25 NOTE — DISCHARGE INSTRUCTIONS
PLEASE MAKE SURE YOU HAVE ALL OF YOUR BELONGINGS BEFORE LEAVING    Pain injection instructions:     This procedure may take a couple weeks to relieve pain  You may get some pain relief from the local anesthetic initally.   Steroids can have side effects of flushed face or nervous feeling.    No driving for 24 hrs.   Activity as tolerated- gradually increase activities.  Dont lift over 10 lbs for 24 hrs   No heat at injection sites for 2 full days. No heating pads, hot tubs, saunas, or swimming in any body of water or pool for 2 full days.  Use ice pack for mild swelling and for comfort , apply for 20 minutes, remove for 20 minute intervals. No direct contact of ice itself  to skin.  May shower today.  Do not allow shower water to beat on injections site(s) for 2 full days. No tub baths for two full days.      Resume Aspirin, Plavix, or Coumadin the day after the procedure unless otherwise instructed.   If diabetic,monitor your glucose carefully as steroids can increase your glucose level    Seek immediate medical help for:   Severe increase in your usual pain or appearance of new pain.  Prolonged (more than 8 hours) or increasing weakness or numbness in the legs or arms.   .    Fever above 100.4 degrees F ,Drainage,redness,active bleeding, or increased swelling at the injection site.  Headache, shortness of breath, chest pain, or breathing problems.    After Surgery:  Always be aware that any surgery can cause these symptoms:    Pain- Medication can be prescribed for pain to decrease your pain but may not completely take your pain away. Over the Counter pain medicine my be enough and you can always use Ice and rest to help ease pain.    Bleeding- a little bleeding after a surgery is usually within normal.  If there is a lot of blood you need to notify your MD.  Emergency treatments of bleeding are cold application, elevation of the bleeding site and compression.    Infection- Infection after surgery is NOT a normal  occurrence.  Signs of infection are fever, swelling, hot to touch the incision.  If this occurs notify your MD immediately.    Nausea- this can be common after a surgery especially if you have had anesthesia medicine or are taking pain medicine.  Steroids have a side effect of nausea sometimes. Staying on clear liquids, bland foods, gingerale, or over the counter anti nausea medicines can help.  If you vomit more than once, notify your MD.  Anti Nausea medicines can be prescribed.

## 2022-01-26 LAB
SARS-COV-2 RNA RESP QL NAA+PROBE: NOT DETECTED
SARS-COV-2- CYCLE NUMBER: NORMAL

## 2022-01-28 ENCOUNTER — HOSPITAL ENCOUNTER (OUTPATIENT)
Facility: AMBULARY SURGERY CENTER | Age: 77
Discharge: HOME | End: 2022-01-28
Attending: ANESTHESIOLOGY | Admitting: ANESTHESIOLOGY
Payer: MEDICARE

## 2022-01-28 DIAGNOSIS — M51.36 DEGENERATIVE DISC DISEASE, LUMBAR: Primary | ICD-10-CM

## 2022-01-28 PROCEDURE — 62323 NJX INTERLAMINAR LMBR/SAC: CPT | Mod: ,,, | Performed by: ANESTHESIOLOGY

## 2022-01-28 PROCEDURE — 62323 PR INJ LUMBAR/SACRAL, W/IMAGING GUIDANCE: ICD-10-PCS | Mod: ,,, | Performed by: ANESTHESIOLOGY

## 2022-01-28 PROCEDURE — 62323 NJX INTERLAMINAR LMBR/SAC: CPT | Performed by: ANESTHESIOLOGY

## 2022-01-28 RX ORDER — MIDAZOLAM HYDROCHLORIDE 2 MG/2ML
INJECTION, SOLUTION INTRAMUSCULAR; INTRAVENOUS
Status: DISCONTINUED | OUTPATIENT
Start: 2022-01-28 | End: 2022-01-28 | Stop reason: HOSPADM

## 2022-01-28 RX ORDER — SODIUM CHLORIDE, SODIUM LACTATE, POTASSIUM CHLORIDE, CALCIUM CHLORIDE 600; 310; 30; 20 MG/100ML; MG/100ML; MG/100ML; MG/100ML
INJECTION, SOLUTION INTRAVENOUS ONCE AS NEEDED
Status: COMPLETED | OUTPATIENT
Start: 2022-01-28 | End: 2022-01-28

## 2022-01-28 RX ORDER — LIDOCAINE HYDROCHLORIDE 10 MG/ML
INJECTION, SOLUTION EPIDURAL; INFILTRATION; INTRACAUDAL; PERINEURAL
Status: DISCONTINUED | OUTPATIENT
Start: 2022-01-28 | End: 2022-01-28 | Stop reason: HOSPADM

## 2022-01-28 RX ORDER — DEXAMETHASONE SODIUM PHOSPHATE 10 MG/ML
INJECTION INTRAMUSCULAR; INTRAVENOUS
Status: DISCONTINUED | OUTPATIENT
Start: 2022-01-28 | End: 2022-01-28 | Stop reason: HOSPADM

## 2022-01-28 RX ORDER — FENTANYL CITRATE 50 UG/ML
INJECTION, SOLUTION INTRAMUSCULAR; INTRAVENOUS
Status: DISCONTINUED | OUTPATIENT
Start: 2022-01-28 | End: 2022-01-28 | Stop reason: HOSPADM

## 2022-01-28 RX ORDER — SODIUM CHLORIDE 9 MG/ML
INJECTION, SOLUTION INTRAMUSCULAR; INTRAVENOUS; SUBCUTANEOUS
Status: DISCONTINUED | OUTPATIENT
Start: 2022-01-28 | End: 2022-01-28 | Stop reason: HOSPADM

## 2022-01-28 RX ADMIN — SODIUM CHLORIDE, SODIUM LACTATE, POTASSIUM CHLORIDE, CALCIUM CHLORIDE: 600; 310; 30; 20 INJECTION, SOLUTION INTRAVENOUS at 09:01

## 2022-01-28 NOTE — OP NOTE
PROCEDURE DATE: 1/28/2022    PROCEDURE:  Caudal epidural steroid injection under fluoroscopy.    Diagnosis: M51.16 Intervertebral disc disorders with radiculopathy, lumbar region  Post Op diagnosis: Same    PHYSICIAN: Luzmaria Marcelino M.D.    MEDICATIONS INJECTED:  10 mg of dexamethasone, 3 mL of 1% lidocaine, and 4 ml of sterile, preservative-free NaCl.    LOCAL ANESTHETIC GIVEN:  Lidocaine 1%, 3 ml total    SEDATION MEDICATIONS: RN IV sedation    ESTIMATED BLOOD LOSS:  none    COMPLICATIONS:  none    TECHNIQUE:   After the patient was placed in prone position, the patient was prepped and draped in the usual sterile fashion using ChloraPrep and sterile towels.  Appropriate anatomic landmarks were determined by identifying the sacral hiatus in the lateral fluoroscopic view.  Local anesthetic was given via a 25g 1.5 inch needle by raising a wheal and infiltrating down to the periosteum.  A 3.5 inch 20 gauge touhy needle was introduced thru the sacral hiatus.  2 ml of contrast was injected to confirm placement in the appropriate area and that there was no vascular uptake.  The medication was then injected slowly.  The patient tolerated the procedure well.    The patient was monitored after the procedure.  Patient was given post procedure and discharge instructions to follow at home.  The patient was discharged in a stable condition

## 2022-01-28 NOTE — DISCHARGE SUMMARY
[facility]  Discharge Note  Short Stay    Procedure(s) (LRB):  Injection-steroid-epidural-caudal (N/A)    OUTCOME: Patient tolerated treatment/procedure well without complication and is now ready for discharge.    DISPOSITION: Home or Self Care    FINAL DIAGNOSIS:  Degenerative disc disease, lumbar    FOLLOWUP: In clinic    DISCHARGE INSTRUCTIONS:    Discharge Procedure Orders   Diet general     Call MD for:  temperature >100.4     Call MD for:  persistent nausea and vomiting     Call MD for:  severe uncontrolled pain     Call MD for:  difficulty breathing, headache or visual disturbances     Call MD for:  redness, tenderness, or signs of infection (pain, swelling, redness, odor or green/yellow discharge around incision site)     Call MD for:  hives     Call MD for:  persistent dizziness or light-headedness     Call MD for:  extreme fatigue        TIME SPENT ON DISCHARGE: 15 minutes

## 2022-01-28 NOTE — PLAN OF CARE
Discharge instructions given to pt/, verbalized understanding.  IV removed.  Able to tolerate PO fluids.  No c/o pain.  Ambulating with cane/nurse to   in no distress.

## 2022-01-28 NOTE — INTERVAL H&P NOTE
The patient has been examined and the H&P has been reviewed:    I concur with the findings and no changes have occurred since H&P was written.    This patient has been cleared for surgery in an ambulatory surgical facility    ASA 3,  Mallampatti Score 3  No history of anesthetic complications  Plan for RN IV sedation    Anesthesia/Surgery risks, benefits and alternative options discussed and understood by patient/family.          There are no hospital problems to display for this patient.

## 2022-01-31 VITALS
OXYGEN SATURATION: 92 % | WEIGHT: 170 LBS | SYSTOLIC BLOOD PRESSURE: 155 MMHG | DIASTOLIC BLOOD PRESSURE: 67 MMHG | RESPIRATION RATE: 18 BRPM | HEART RATE: 72 BPM | HEIGHT: 66 IN | TEMPERATURE: 98 F | BODY MASS INDEX: 27.32 KG/M2

## 2022-02-17 ENCOUNTER — TELEPHONE (OUTPATIENT)
Dept: PAIN MEDICINE | Facility: CLINIC | Age: 77
End: 2022-02-17
Payer: MEDICARE

## 2022-02-17 DIAGNOSIS — M47.896 OTHER SPONDYLOSIS, LUMBAR REGION: ICD-10-CM

## 2022-02-17 DIAGNOSIS — M54.16 LUMBAR RADICULOPATHY: ICD-10-CM

## 2022-02-17 DIAGNOSIS — M96.1 FAILED BACK SYNDROME OF LUMBAR SPINE: Primary | ICD-10-CM

## 2022-02-17 DIAGNOSIS — M51.36 DDD (DEGENERATIVE DISC DISEASE), LUMBAR: ICD-10-CM

## 2022-02-17 RX ORDER — PREGABALIN 200 MG/1
200 CAPSULE ORAL 3 TIMES DAILY
Qty: 90 CAPSULE | Refills: 2 | Status: SHIPPED | OUTPATIENT
Start: 2022-02-17 | End: 2022-05-09

## 2022-02-17 RX ORDER — CYCLOBENZAPRINE HCL 10 MG
10 TABLET ORAL 3 TIMES DAILY PRN
Qty: 90 TABLET | Refills: 2 | Status: SHIPPED | OUTPATIENT
Start: 2022-02-17 | End: 2022-05-09

## 2022-02-17 NOTE — TELEPHONE ENCOUNTER
----- Message from Tracie Richards sent at 2/17/2022 10:07 AM CST -----  .Type:  RX Refill Request    Who Called: PT     Refill or New Rx: REFILLS    RX Name and Strength:     cyclobenzaprine (FLEXERIL) 10 MG tablet    pregabalin (LYRICA) 200 MG Cap        Preferred Pharmacy with phone number: [facility] [phone]     Pt Call Back Number: [phone]    Additional Information: Thank You

## 2022-02-25 ENCOUNTER — OFFICE VISIT (OUTPATIENT)
Dept: PAIN MEDICINE | Facility: CLINIC | Age: 77
End: 2022-02-25
Payer: MEDICARE

## 2022-02-25 VITALS
HEART RATE: 96 BPM | SYSTOLIC BLOOD PRESSURE: 134 MMHG | HEIGHT: 66 IN | WEIGHT: 170 LBS | BODY MASS INDEX: 27.32 KG/M2 | DIASTOLIC BLOOD PRESSURE: 76 MMHG

## 2022-02-25 DIAGNOSIS — M51.36 DDD (DEGENERATIVE DISC DISEASE), LUMBAR: ICD-10-CM

## 2022-02-25 DIAGNOSIS — M47.896 OTHER SPONDYLOSIS, LUMBAR REGION: Primary | ICD-10-CM

## 2022-02-25 DIAGNOSIS — M54.16 LUMBAR RADICULOPATHY: ICD-10-CM

## 2022-02-25 DIAGNOSIS — M96.1 FAILED BACK SYNDROME OF LUMBAR SPINE: ICD-10-CM

## 2022-02-25 PROCEDURE — 1159F MED LIST DOCD IN RCRD: CPT | Mod: CPTII,S$GLB,, | Performed by: ANESTHESIOLOGY

## 2022-02-25 PROCEDURE — 3008F PR BODY MASS INDEX (BMI) DOCUMENTED: ICD-10-PCS | Mod: CPTII,S$GLB,, | Performed by: ANESTHESIOLOGY

## 2022-02-25 PROCEDURE — 3008F BODY MASS INDEX DOCD: CPT | Mod: CPTII,S$GLB,, | Performed by: ANESTHESIOLOGY

## 2022-02-25 PROCEDURE — 1100F PTFALLS ASSESS-DOCD GE2>/YR: CPT | Mod: CPTII,S$GLB,, | Performed by: ANESTHESIOLOGY

## 2022-02-25 PROCEDURE — 4010F ACE/ARB THERAPY RXD/TAKEN: CPT | Mod: CPTII,S$GLB,, | Performed by: ANESTHESIOLOGY

## 2022-02-25 PROCEDURE — 99214 PR OFFICE/OUTPT VISIT, EST, LEVL IV, 30-39 MIN: ICD-10-PCS | Mod: S$GLB,,, | Performed by: ANESTHESIOLOGY

## 2022-02-25 PROCEDURE — 99214 OFFICE O/P EST MOD 30 MIN: CPT | Mod: S$GLB,,, | Performed by: ANESTHESIOLOGY

## 2022-02-25 PROCEDURE — 3075F SYST BP GE 130 - 139MM HG: CPT | Mod: CPTII,S$GLB,, | Performed by: ANESTHESIOLOGY

## 2022-02-25 PROCEDURE — 3078F PR MOST RECENT DIASTOLIC BLOOD PRESSURE < 80 MM HG: ICD-10-PCS | Mod: CPTII,S$GLB,, | Performed by: ANESTHESIOLOGY

## 2022-02-25 PROCEDURE — 1100F PR PT FALLS ASSESS DOC 2+ FALLS/FALL W/INJURY/YR: ICD-10-PCS | Mod: CPTII,S$GLB,, | Performed by: ANESTHESIOLOGY

## 2022-02-25 PROCEDURE — 99999 PR PBB SHADOW E&M-EST. PATIENT-LVL III: ICD-10-PCS | Mod: PBBFAC,,, | Performed by: ANESTHESIOLOGY

## 2022-02-25 PROCEDURE — 3078F DIAST BP <80 MM HG: CPT | Mod: CPTII,S$GLB,, | Performed by: ANESTHESIOLOGY

## 2022-02-25 PROCEDURE — 3288F PR FALLS RISK ASSESSMENT DOCUMENTED: ICD-10-PCS | Mod: CPTII,S$GLB,, | Performed by: ANESTHESIOLOGY

## 2022-02-25 PROCEDURE — 99999 PR PBB SHADOW E&M-EST. PATIENT-LVL III: CPT | Mod: PBBFAC,,, | Performed by: ANESTHESIOLOGY

## 2022-02-25 PROCEDURE — 3288F FALL RISK ASSESSMENT DOCD: CPT | Mod: CPTII,S$GLB,, | Performed by: ANESTHESIOLOGY

## 2022-02-25 PROCEDURE — 1159F PR MEDICATION LIST DOCUMENTED IN MEDICAL RECORD: ICD-10-PCS | Mod: CPTII,S$GLB,, | Performed by: ANESTHESIOLOGY

## 2022-02-25 PROCEDURE — 3075F PR MOST RECENT SYSTOLIC BLOOD PRESS GE 130-139MM HG: ICD-10-PCS | Mod: CPTII,S$GLB,, | Performed by: ANESTHESIOLOGY

## 2022-02-25 PROCEDURE — 4010F PR ACE/ARB THEARPY RXD/TAKEN: ICD-10-PCS | Mod: CPTII,S$GLB,, | Performed by: ANESTHESIOLOGY

## 2022-02-25 RX ORDER — OXYCODONE AND ACETAMINOPHEN 10; 325 MG/1; MG/1
TABLET ORAL
Qty: 140 TABLET | Refills: 0 | Status: SHIPPED | OUTPATIENT
Start: 2022-03-14 | End: 2022-03-25 | Stop reason: SDUPTHER

## 2022-02-25 NOTE — PROGRESS NOTES
"FOLLOW UP NOTE:     CHIEF COMPLAINT: back pain    INITIAL HISTORY OF PRESENT ILLNESS: Froilan Ray is a 75 y.o. female with PMH significant for hx of SCS implantation (Dr. Noel; 2013) with subsequent explantation, hx of ORIF of left intertrochanteric fracture (Dr. De La Cruz; 2018), hx of seizure disorder (well controlled), and HTN presents for the evaluation of low back pain. 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 The patient reports that she saw Dr. Parr in the past. The patient reports that she has been treated by Dr. Bhatti for the past 5 years. The patient presents to re-establish care with a new pain management provider today. In regards to her pain, the patient localizes her pain to the area across her lower back. The patient reports of radiation down her BLE's to her feet. The patient describes her pain as a burning type of pain. The patient reports of numbness in her legs. The patient reports that her pain is a 10/10 at its worst. Patient denies of any urinary/fecal incontinence, saddle anesthesia. The patient reports of subjective weakness in her legs.      Aggravating factors: bending, lifting items     Mitigating factors: lyrica (benefit in regards to burning leg pain), pain medication     Relevant Surgeries: yes; cervical spine X 1 and lumbar spine surgery X 1     Interventional Therapies: yes; prior SCS implantation and removal. Last saw Dr. Bhatti over the last 5 years. Patient reports that she was "dismissed" from Dr. Bhatti's practice. She reports that she was dismissed secondary to being unable to present for a scheduled UDS. One injection in her back and knee injections - reports of some benefit in regards to her knee pain. Denies of prior RFA.    INTERVAL HISTORY OF PRESENT ILLNESS: Froilan Ray is a 76 y.o. female with PMH significant for hx of SCS implantation (Dr." Bert; 2013) with subsequent explantation, hx of ORIF of left intertrochanteric fracture (Dr. De La Cruz; 2018), hx of seizure disorder (well controlled), and HTN presents as an established patient for the continued management of back pain. The patient is s/p caudal epidural steroid injection on 1/28/2022, and she reports of no relief. Today, the patient continues to localize her pain to the area across her lower back. The patient reports of radiation down her BLE's to her feet. She reports that her pain is worsened with any physical activity in general. She reports that her pain is improved with her pain regimen as outlined below. The patient denies of any significant changes in her health since her last appointment. The patient also denies of any changes in the character of her pain since her last appointment. The patient reports that her current pain is a 8/10. Patient denies of any urinary/fecal incontinence, saddle anesthesia, or new weakness.     INTERVENTIONAL PAIN HISTORY:  1/28/2022: Caudal epidural steroid injection - no relief  11/19/2021: Bilateral knee injections      CURRENT PAIN MEDICATIONS:   Lyrica 200 mg PO TID  OTC aleve  flexeril 10 mg PO TID  Percocet  mg PO QID        ROS:  Review of Systems   Constitutional: Negative for chills and fever.   HENT: Negative for sore throat.    Eyes: Negative for visual disturbance.   Respiratory: Negative for shortness of breath.    Cardiovascular: Negative for chest pain.   Gastrointestinal: Negative for nausea and vomiting.   Genitourinary: Negative for difficulty urinating.   Musculoskeletal: Positive for arthralgias, back pain and gait problem.   Skin: Negative for rash.   Allergic/Immunologic: Negative for immunocompromised state.   Neurological: Positive for weakness and numbness. Negative for syncope.   Hematological: Does not bruise/bleed easily.   Psychiatric/Behavioral: Negative for suicidal ideas.        MEDICAL, SURGICAL, FAMILY, SOCIAL HX:  "reviewed    MEDICATIONS/ALLERGIES: reviewed    PHYSICAL EXAM:    VITALS: Vitals reviewed.   Vitals:    02/25/22 1209   BP: 134/76   Pulse: 96   Weight: 77.1 kg (170 lb)   Height: 5' 6" (1.676 m)       Physical Exam  Vitals and nursing note reviewed.   Constitutional:       Appearance: She is not diaphoretic.   HENT:      Head: Normocephalic and atraumatic.   Eyes:      General:         Right eye: No discharge.         Left eye: No discharge.      Conjunctiva/sclera: Conjunctivae normal.   Cardiovascular:      Rate and Rhythm: Normal rate.   Pulmonary:      Effort: Pulmonary effort is normal. No respiratory distress.      Breath sounds: Normal breath sounds.   Abdominal:      Palpations: Abdomen is soft.   Skin:     General: Skin is warm and dry.      Findings: No rash.   Neurological:      Mental Status: She is alert and oriented to person, place, and time.   Psychiatric:         Mood and Affect: Mood and affect normal.         Cognition and Memory: Memory normal.         Judgment: Judgment normal.          UPPER EXTREMITIES: Normal alignment, normal range of motion, no atrophy, no skin changes,  hair growth and nail growth normal and equal bilaterally. No swelling, no tenderness.    LOWER EXTREMITIES:  Normal alignment, normal range of motion, no atrophy, no skin changes,  hair growth and nail growth normal and equal bilaterally. No swelling, no tenderness.     LUMBAR SPINE; patient with severe kyphosis of the lumbar spine  Lumbar spine: ROM is limited with flexion extension and oblique extension with increased pain with passive ROM.    ((+)) Facet loading   Myofascial exam: Tenderness to palpation across lumbar paraspinous muscles. Prior midline scar is well-healed.      MOTOR: Tone and bulk: normal bilateral upper and lower Strength: normal "   Delt      Bi         Tri        WE      WF                        R          5          5          5          5          5          5            L          5          5          5          5          5          5               IP         ADD     ABD     Quad   TA        Gas      HAM  R          5          5          5          5          5          5          5  L          5          5          5          5          5          5          5     SENSATION: Decreased sensation in the lower extremities to light touch  REFLEXES: normal, symmetric, nonbrisk.  Toes down, no clonus. Negative dooley's sign bilaterally.  GAIT: antalgic gait; uses a single point cane for assistance with ambulation    IMAGING: no new imaging to review     ASSESSMENT: Froilan Ray is a 76 y.o. female with PMH significant for hx of SCS implantation (Dr. Noel; 2013) with subsequent explantation, hx of ORIF of left intertrochanteric fracture (Dr. De La Cruz; 2018), hx of seizure disorder (well controlled), and HTN presents as an established patient for the continued management of back pain. Today, the patient localizes her worst pain to the area across her lower back with radiation down her LLE to her foot. She reports of benefit on her current pain regimen. Treatment plan outlined below.      PLAN:  1. Refilled Percocet  mg PO q 6 hr PRN for breakthrough pain; # 140 tablets; 0 refills.  reviewed without discrepancies. Extra 20 tablets provided for to take PRN as she reports that she requires an additional tablet of Percocet as her pain is rather severe on certain days  2. Continue lyrica 200 mg PO TID for neuropathic pain  3. UDS to be collected today. The patient reports that she last had pain medications this AM.   4. I have stressed the importance of physical activity and a home exercise plan to help with chronic pain and improve health.  5. Discussed MBBs as a potential treatment modality for her chronic back pain;  pamphlet provided; patient to consider  6. RTC in 4 weeks for follow-up    Luzmaria Marcelino MD  Pain Management

## 2022-03-25 ENCOUNTER — OFFICE VISIT (OUTPATIENT)
Dept: PAIN MEDICINE | Facility: CLINIC | Age: 77
End: 2022-03-25
Payer: MEDICARE

## 2022-03-25 VITALS
HEIGHT: 66 IN | HEART RATE: 96 BPM | WEIGHT: 170 LBS | DIASTOLIC BLOOD PRESSURE: 89 MMHG | BODY MASS INDEX: 27.32 KG/M2 | SYSTOLIC BLOOD PRESSURE: 159 MMHG

## 2022-03-25 DIAGNOSIS — M54.16 LUMBAR RADICULOPATHY: ICD-10-CM

## 2022-03-25 DIAGNOSIS — M51.36 DDD (DEGENERATIVE DISC DISEASE), LUMBAR: ICD-10-CM

## 2022-03-25 DIAGNOSIS — M47.896 OTHER SPONDYLOSIS, LUMBAR REGION: ICD-10-CM

## 2022-03-25 DIAGNOSIS — M96.1 FAILED BACK SYNDROME OF LUMBAR SPINE: Primary | ICD-10-CM

## 2022-03-25 DIAGNOSIS — F11.90 CHRONIC, CONTINUOUS USE OF OPIOIDS: ICD-10-CM

## 2022-03-25 PROCEDURE — 3077F PR MOST RECENT SYSTOLIC BLOOD PRESSURE >= 140 MM HG: ICD-10-PCS | Mod: CPTII,S$GLB,, | Performed by: ANESTHESIOLOGY

## 2022-03-25 PROCEDURE — 4010F ACE/ARB THERAPY RXD/TAKEN: CPT | Mod: CPTII,S$GLB,, | Performed by: ANESTHESIOLOGY

## 2022-03-25 PROCEDURE — 4010F PR ACE/ARB THEARPY RXD/TAKEN: ICD-10-PCS | Mod: CPTII,S$GLB,, | Performed by: ANESTHESIOLOGY

## 2022-03-25 PROCEDURE — 3008F PR BODY MASS INDEX (BMI) DOCUMENTED: ICD-10-PCS | Mod: CPTII,S$GLB,, | Performed by: ANESTHESIOLOGY

## 2022-03-25 PROCEDURE — 99999 PR PBB SHADOW E&M-EST. PATIENT-LVL III: CPT | Mod: PBBFAC,,, | Performed by: ANESTHESIOLOGY

## 2022-03-25 PROCEDURE — 1125F PR PAIN SEVERITY QUANTIFIED, PAIN PRESENT: ICD-10-PCS | Mod: CPTII,S$GLB,, | Performed by: ANESTHESIOLOGY

## 2022-03-25 PROCEDURE — 1125F AMNT PAIN NOTED PAIN PRSNT: CPT | Mod: CPTII,S$GLB,, | Performed by: ANESTHESIOLOGY

## 2022-03-25 PROCEDURE — 3077F SYST BP >= 140 MM HG: CPT | Mod: CPTII,S$GLB,, | Performed by: ANESTHESIOLOGY

## 2022-03-25 PROCEDURE — 1100F PTFALLS ASSESS-DOCD GE2>/YR: CPT | Mod: CPTII,S$GLB,, | Performed by: ANESTHESIOLOGY

## 2022-03-25 PROCEDURE — 3288F PR FALLS RISK ASSESSMENT DOCUMENTED: ICD-10-PCS | Mod: CPTII,S$GLB,, | Performed by: ANESTHESIOLOGY

## 2022-03-25 PROCEDURE — 99213 OFFICE O/P EST LOW 20 MIN: CPT | Mod: S$GLB,,, | Performed by: ANESTHESIOLOGY

## 2022-03-25 PROCEDURE — 3079F DIAST BP 80-89 MM HG: CPT | Mod: CPTII,S$GLB,, | Performed by: ANESTHESIOLOGY

## 2022-03-25 PROCEDURE — 80307 DRUG TEST PRSMV CHEM ANLYZR: CPT | Performed by: ANESTHESIOLOGY

## 2022-03-25 PROCEDURE — 3008F BODY MASS INDEX DOCD: CPT | Mod: CPTII,S$GLB,, | Performed by: ANESTHESIOLOGY

## 2022-03-25 PROCEDURE — 1100F PR PT FALLS ASSESS DOC 2+ FALLS/FALL W/INJURY/YR: ICD-10-PCS | Mod: CPTII,S$GLB,, | Performed by: ANESTHESIOLOGY

## 2022-03-25 PROCEDURE — 3079F PR MOST RECENT DIASTOLIC BLOOD PRESSURE 80-89 MM HG: ICD-10-PCS | Mod: CPTII,S$GLB,, | Performed by: ANESTHESIOLOGY

## 2022-03-25 PROCEDURE — 3288F FALL RISK ASSESSMENT DOCD: CPT | Mod: CPTII,S$GLB,, | Performed by: ANESTHESIOLOGY

## 2022-03-25 PROCEDURE — 1159F MED LIST DOCD IN RCRD: CPT | Mod: CPTII,S$GLB,, | Performed by: ANESTHESIOLOGY

## 2022-03-25 PROCEDURE — 99999 PR PBB SHADOW E&M-EST. PATIENT-LVL III: ICD-10-PCS | Mod: PBBFAC,,, | Performed by: ANESTHESIOLOGY

## 2022-03-25 PROCEDURE — 1159F PR MEDICATION LIST DOCUMENTED IN MEDICAL RECORD: ICD-10-PCS | Mod: CPTII,S$GLB,, | Performed by: ANESTHESIOLOGY

## 2022-03-25 PROCEDURE — 99213 PR OFFICE/OUTPT VISIT, EST, LEVL III, 20-29 MIN: ICD-10-PCS | Mod: S$GLB,,, | Performed by: ANESTHESIOLOGY

## 2022-03-25 RX ORDER — OXYCODONE AND ACETAMINOPHEN 10; 325 MG/1; MG/1
TABLET ORAL
Qty: 140 TABLET | Refills: 0 | Status: SHIPPED | OUTPATIENT
Start: 2022-04-11 | End: 2022-06-21

## 2022-03-25 RX ORDER — OXYCODONE AND ACETAMINOPHEN 10; 325 MG/1; MG/1
TABLET ORAL
Qty: 140 TABLET | Refills: 0 | Status: SHIPPED | OUTPATIENT
Start: 2022-06-06 | End: 2022-06-21

## 2022-03-25 RX ORDER — OXYCODONE AND ACETAMINOPHEN 10; 325 MG/1; MG/1
TABLET ORAL
Qty: 140 TABLET | Refills: 0 | Status: SHIPPED | OUTPATIENT
Start: 2022-05-09 | End: 2022-06-21

## 2022-03-25 NOTE — PROGRESS NOTES
"FOLLOW UP NOTE:     CHIEF COMPLAINT: back pain    INITIAL HISTORY OF PRESENT ILLNESS:  Froilan Ray is a 75 y.o. female with PMH significant for hx of SCS implantation (Dr. Noel; 2013) with subsequent explantation, hx of ORIF of left intertrochanteric fracture (Dr. De La Cruz; 2018), hx of seizure disorder (well controlled), and HTN presents for the evaluation of low back pain. 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 The patient reports that she saw Dr. Parr in the past. The patient reports that she has been treated by Dr. Bhatti for the past 5 years. The patient presents to re-establish care with a new pain management provider today. In regards to her pain, the patient localizes her pain to the area across her lower back. The patient reports of radiation down her BLE's to her feet. The patient describes her pain as a burning type of pain. The patient reports of numbness in her legs. The patient reports that her pain is a 10/10 at its worst. Patient denies of any urinary/fecal incontinence, saddle anesthesia. The patient reports of subjective weakness in her legs.      Aggravating factors: bending, lifting items     Mitigating factors: lyrica (benefit in regards to burning leg pain), pain medication     Relevant Surgeries: yes; cervical spine X 1 and lumbar spine surgery X 1     Interventional Therapies: yes; prior SCS implantation and removal. Last saw Dr. Bhatti over the last 5 years. Patient reports that she was "dismissed" from Dr. Bhatti's practice. She reports that she was dismissed secondary to being unable to present for a scheduled UDS. One injection in her back and knee injections - reports of some benefit in regards to her knee pain. Denies of prior RFA.    INTERVAL HISTORY OF PRESENT ILLNESS: Froilan Ray is a 76 y.o. female  with PMH significant for hx of SCS implantation (Dr." Bert; 2013) with subsequent explantation, hx of ORIF of left intertrochanteric fracture (Dr. De La Cruz; 2018), hx of seizure disorder (well controlled), and HTN presents as an established patient for the continued management of back pain. Today, the patient continues to localize her pain to the area across her lower back with radiation down her BLE's to her feet. She reports that her pain is worsened with any physical activity in general. She reports that her pain is improved with her pain regimen as outlined below. The patient denies of any significant changes in her health since her last appointment. The patient also denies of any changes in the character of her pain since her last appointment. The patient reports that her current pain is a 8/10. Patient denies of any urinary/fecal incontinence, saddle anesthesia, or new weakness.     The patient presents today for a medication refill.     INTERVENTIONAL PAIN HISTORY:  1/28/2022: Caudal epidural steroid injection - no relief  11/19/2021: Bilateral knee injections      CURRENT PAIN MEDICATIONS:   Lyrica 200 mg PO TID  OTC aleve  flexeril 10 mg PO TID  Percocet  mg PO QID     ROS:  Review of Systems   Constitutional: Negative for chills and fever.   HENT: Negative for sore throat.    Eyes: Negative for visual disturbance.   Respiratory: Negative for shortness of breath.    Cardiovascular: Negative for chest pain.   Gastrointestinal: Negative for nausea and vomiting.   Genitourinary: Negative for difficulty urinating.   Musculoskeletal: Positive for arthralgias, back pain and gait problem.   Skin: Negative for rash.   Allergic/Immunologic: Negative for immunocompromised state.   Neurological: Positive for weakness and numbness. Negative for syncope.   Hematological: Does not bruise/bleed easily.   Psychiatric/Behavioral: Negative for suicidal ideas.        MEDICAL, SURGICAL, FAMILY, SOCIAL HX: reviewed    MEDICATIONS/ALLERGIES: reviewed    PHYSICAL  "EXAM:    VITALS: Vitals reviewed.   Vitals:    03/25/22 1217   BP: (!) 159/89   Pulse: 96   Weight: 77.1 kg (170 lb)   Height: 5' 6" (1.676 m)   PainSc:   8       Physical Exam  Vitals and nursing note reviewed.   Constitutional:       Appearance: She is not diaphoretic.   HENT:      Head: Normocephalic and atraumatic.   Eyes:      General:         Right eye: No discharge.         Left eye: No discharge.      Conjunctiva/sclera: Conjunctivae normal.   Cardiovascular:      Rate and Rhythm: Normal rate.   Pulmonary:      Effort: Pulmonary effort is normal. No respiratory distress.      Breath sounds: Normal breath sounds.   Abdominal:      Palpations: Abdomen is soft.   Skin:     General: Skin is warm and dry.      Findings: No rash.   Neurological:      Mental Status: She is alert and oriented to person, place, and time.   Psychiatric:         Mood and Affect: Mood and affect normal.         Cognition and Memory: Memory normal.         Judgment: Judgment normal.        UPPER EXTREMITIES: Normal alignment, normal range of motion, no atrophy, no skin changes,  hair growth and nail growth normal and equal bilaterally. No swelling, no tenderness.    LOWER EXTREMITIES:  Normal alignment, normal range of motion, no atrophy, no skin changes,  hair growth and nail growth normal and equal bilaterally. No swelling, no tenderness.     LUMBAR SPINE; patient with severe kyphosis of the lumbar spine  Lumbar spine: ROM is limited with flexion extension and oblique extension with increased pain with passive ROM.    ((+)) Facet loading   Myofascial exam: Tenderness to palpation across lumbar paraspinous muscles. Prior midline scar is well-healed.      MOTOR: Tone and bulk: normal bilateral upper and lower Strength: normal "   Delt      Bi         Tri        WE      WF                        R          5          5          5          5          5          5            L          5          5          5          5          5          5               IP         ADD     ABD     Quad   TA        Gas      HAM  R          5          5          5          5          5          5          5  L          5          5          5          5          5          5          5     SENSATION: Decreased sensation in the lower extremities to light touch  REFLEXES: normal, symmetric, nonbrisk.  Toes down, no clonus. Negative dooley's sign bilaterally.  GAIT: antalgic gait; uses a single point cane for assistance with ambulation     IMAGING: no new imaging to review    Froilan Ray is a 76 y.o. female with PMH significant for hx of SCS implantation (Dr. Noel; 2013) with subsequent explantation, hx of ORIF of left intertrochanteric fracture (Dr. De La Cruz; 2018), hx of seizure disorder (well controlled), and HTN presents as an established patient for the continued management of back pain. Today, the patient localizes her worst pain to the area across her lower back with radiation down her LLE to her foot. She reports of benefit on her current pain regimen. Treatment plan outlined below.      PLAN:  1. Refilled Percocet  mg PO q 6 hr PRN for breakthrough pain; # 140 tablets; 2 refills.  reviewed without discrepancies. Extra 20 tablets provided for to take PRN as she reports that she requires an additional tablet of Percocet as her pain is rather severe on certain days  2. Continue lyrica 200 mg PO TID for neuropathic pain  3. UDS to be collected today. The patient reports that she last had pain medications this AM.   4. I have stressed the importance of physical activity and a home exercise plan to help with chronic pain and improve health.  5. Would offer lumbar MBBs in the future if her pain were to worsen  6. RTC in 3 months for  follow-up    Luzmaria Marcelino MD  Pain Management

## 2022-04-02 LAB
6MAM UR QL: NOT DETECTED
7AMINOCLONAZEPAM UR QL: NOT DETECTED
A-OH ALPRAZ UR QL: NOT DETECTED
ALPHA-OH-MIDAZOLAM: NOT DETECTED
ALPRAZ UR QL: NOT DETECTED
AMPHET UR QL SCN: NOT DETECTED
ANNOTATION COMMENT IMP: ABNORMAL
ANNOTATION COMMENT IMP: ABNORMAL
BARBITURATES UR QL: NOT DETECTED
BUPRENORPHINE UR QL: NOT DETECTED
BZE UR QL: NOT DETECTED
CARBOXYTHC UR QL: NOT DETECTED
CARISOPRODOL UR QL: NOT DETECTED
CLONAZEPAM UR QL: NOT DETECTED
CODEINE UR QL: NOT DETECTED
CREAT UR-MCNC: <20 MG/DL (ref 20–400)
DIAZEPAM UR QL: NOT DETECTED
ETHYL GLUCURONIDE UR QL: NOT DETECTED
FENTANYL UR QL: NOT DETECTED
GABAPENTIN: NOT DETECTED
HYDROCODONE UR QL: NOT DETECTED
HYDROMORPHONE UR QL: NOT DETECTED
LORAZEPAM UR QL: NOT DETECTED
MDA UR QL: NOT DETECTED
MDEA UR QL: NOT DETECTED
MDMA UR QL: NOT DETECTED
ME-PHENIDATE UR QL: NOT DETECTED
METHADONE UR QL: NOT DETECTED
METHAMPHET UR QL: NOT DETECTED
MIDAZOLAM UR QL SCN: NOT DETECTED
MORPHINE UR QL: NOT DETECTED
NALOXONE: NOT DETECTED
NORBUPRENORPHINE UR QL CFM: NOT DETECTED
NORDIAZEPAM UR QL: NOT DETECTED
NORFENTANYL UR QL: NOT DETECTED
NORHYDROCODONE UR QL CFM: NOT DETECTED
NORMEPERIDINE UR QL CFM: NOT DETECTED
NOROXYCODONE UR QL CFM: PRESENT
NOROXYMORPHONE UR QL SCN: PRESENT
OXAZEPAM UR QL: NOT DETECTED
OXYCODONE UR QL: PRESENT
OXYMORPHONE UR QL: PRESENT
PATHOLOGY STUDY: ABNORMAL
PCP UR QL: NOT DETECTED
PHENTERMINE UR QL: NOT DETECTED
PREGABALIN: PRESENT
SERVICE CMNT-IMP: ABNORMAL
TAPENTADOL UR QL SCN: NOT DETECTED
TAPENTADOL UR QL SCN: NOT DETECTED
TEMAZEPAM UR QL: NOT DETECTED
TRAMADOL UR QL: NOT DETECTED
ZOLPIDEM METABOLITE: NOT DETECTED
ZOLPIDEM UR QL: NOT DETECTED

## 2022-05-11 ENCOUNTER — OFFICE VISIT (OUTPATIENT)
Dept: GASTROENTEROLOGY | Facility: CLINIC | Age: 77
End: 2022-05-11
Payer: MEDICARE

## 2022-05-11 ENCOUNTER — LAB VISIT (OUTPATIENT)
Dept: LAB | Facility: HOSPITAL | Age: 77
End: 2022-05-11
Attending: INTERNAL MEDICINE
Payer: MEDICARE

## 2022-05-11 VITALS
BODY MASS INDEX: 24.73 KG/M2 | HEIGHT: 66 IN | SYSTOLIC BLOOD PRESSURE: 128 MMHG | WEIGHT: 153.88 LBS | DIASTOLIC BLOOD PRESSURE: 79 MMHG | HEART RATE: 77 BPM

## 2022-05-11 DIAGNOSIS — Z87.11 HISTORY OF STOMACH ULCERS: ICD-10-CM

## 2022-05-11 DIAGNOSIS — D50.0 IRON DEFICIENCY ANEMIA DUE TO CHRONIC BLOOD LOSS: Primary | ICD-10-CM

## 2022-05-11 DIAGNOSIS — D50.0 IRON DEFICIENCY ANEMIA DUE TO CHRONIC BLOOD LOSS: ICD-10-CM

## 2022-05-11 DIAGNOSIS — D64.9 ANEMIA, UNSPECIFIED: ICD-10-CM

## 2022-05-11 DIAGNOSIS — E53.8 VITAMIN B12 DEFICIENCY: ICD-10-CM

## 2022-05-11 LAB
ANION GAP SERPL CALC-SCNC: 9 MMOL/L (ref 8–16)
BASOPHILS # BLD AUTO: 0.07 K/UL (ref 0–0.2)
BASOPHILS NFR BLD: 1.3 % (ref 0–1.9)
BUN SERPL-MCNC: 16 MG/DL (ref 8–23)
CALCIUM SERPL-MCNC: 9.2 MG/DL (ref 8.7–10.5)
CHLORIDE SERPL-SCNC: 105 MMOL/L (ref 95–110)
CO2 SERPL-SCNC: 25 MMOL/L (ref 23–29)
CREAT SERPL-MCNC: 0.8 MG/DL (ref 0.5–1.4)
DIFFERENTIAL METHOD: ABNORMAL
EOSINOPHIL # BLD AUTO: 0.2 K/UL (ref 0–0.5)
EOSINOPHIL NFR BLD: 3.2 % (ref 0–8)
ERYTHROCYTE [DISTWIDTH] IN BLOOD BY AUTOMATED COUNT: 13.7 % (ref 11.5–14.5)
EST. GFR  (AFRICAN AMERICAN): >60 ML/MIN/1.73 M^2
EST. GFR  (NON AFRICAN AMERICAN): >60 ML/MIN/1.73 M^2
FERRITIN SERPL-MCNC: 9 NG/ML (ref 20–300)
FOLATE SERPL-MCNC: 8.3 NG/ML (ref 4–24)
GLUCOSE SERPL-MCNC: 89 MG/DL (ref 70–110)
HCT VFR BLD AUTO: 35.1 % (ref 37–48.5)
HGB BLD-MCNC: 11.3 G/DL (ref 12–16)
IMM GRANULOCYTES # BLD AUTO: 0.01 K/UL (ref 0–0.04)
IMM GRANULOCYTES NFR BLD AUTO: 0.2 % (ref 0–0.5)
LYMPHOCYTES # BLD AUTO: 2.1 K/UL (ref 1–4.8)
LYMPHOCYTES NFR BLD: 39 % (ref 18–48)
MCH RBC QN AUTO: 29.3 PG (ref 27–31)
MCHC RBC AUTO-ENTMCNC: 32.2 G/DL (ref 32–36)
MCV RBC AUTO: 91 FL (ref 82–98)
MONOCYTES # BLD AUTO: 0.4 K/UL (ref 0.3–1)
MONOCYTES NFR BLD: 7.6 % (ref 4–15)
NEUTROPHILS # BLD AUTO: 2.6 K/UL (ref 1.8–7.7)
NEUTROPHILS NFR BLD: 48.7 % (ref 38–73)
NRBC BLD-RTO: 0 /100 WBC
PLATELET # BLD AUTO: 355 K/UL (ref 150–450)
PMV BLD AUTO: 9.1 FL (ref 9.2–12.9)
POTASSIUM SERPL-SCNC: 4.3 MMOL/L (ref 3.5–5.1)
RBC # BLD AUTO: 3.86 M/UL (ref 4–5.4)
SODIUM SERPL-SCNC: 139 MMOL/L (ref 136–145)
TSH SERPL DL<=0.005 MIU/L-ACNC: 0.69 UIU/ML (ref 0.4–4)
VIT B12 SERPL-MCNC: 198 PG/ML (ref 210–950)
WBC # BLD AUTO: 5.38 K/UL (ref 3.9–12.7)

## 2022-05-11 PROCEDURE — 1126F AMNT PAIN NOTED NONE PRSNT: CPT | Mod: CPTII,S$GLB,, | Performed by: INTERNAL MEDICINE

## 2022-05-11 PROCEDURE — 1101F PR PT FALLS ASSESS DOC 0-1 FALLS W/OUT INJ PAST YR: ICD-10-PCS | Mod: CPTII,S$GLB,, | Performed by: INTERNAL MEDICINE

## 2022-05-11 PROCEDURE — 82746 ASSAY OF FOLIC ACID SERUM: CPT | Performed by: INTERNAL MEDICINE

## 2022-05-11 PROCEDURE — 86256 FLUORESCENT ANTIBODY TITER: CPT | Performed by: INTERNAL MEDICINE

## 2022-05-11 PROCEDURE — 3008F PR BODY MASS INDEX (BMI) DOCUMENTED: ICD-10-PCS | Mod: CPTII,S$GLB,, | Performed by: INTERNAL MEDICINE

## 2022-05-11 PROCEDURE — 1101F PT FALLS ASSESS-DOCD LE1/YR: CPT | Mod: CPTII,S$GLB,, | Performed by: INTERNAL MEDICINE

## 2022-05-11 PROCEDURE — 3074F PR MOST RECENT SYSTOLIC BLOOD PRESSURE < 130 MM HG: ICD-10-PCS | Mod: CPTII,S$GLB,, | Performed by: INTERNAL MEDICINE

## 2022-05-11 PROCEDURE — 85025 COMPLETE CBC W/AUTO DIFF WBC: CPT | Performed by: INTERNAL MEDICINE

## 2022-05-11 PROCEDURE — 82728 ASSAY OF FERRITIN: CPT | Performed by: INTERNAL MEDICINE

## 2022-05-11 PROCEDURE — 82607 VITAMIN B-12: CPT | Performed by: INTERNAL MEDICINE

## 2022-05-11 PROCEDURE — 3008F BODY MASS INDEX DOCD: CPT | Mod: CPTII,S$GLB,, | Performed by: INTERNAL MEDICINE

## 2022-05-11 PROCEDURE — 86340 INTRINSIC FACTOR ANTIBODY: CPT | Performed by: INTERNAL MEDICINE

## 2022-05-11 PROCEDURE — 99999 PR PBB SHADOW E&M-EST. PATIENT-LVL II: ICD-10-PCS | Mod: PBBFAC,,, | Performed by: INTERNAL MEDICINE

## 2022-05-11 PROCEDURE — 4010F PR ACE/ARB THEARPY RXD/TAKEN: ICD-10-PCS | Mod: CPTII,S$GLB,, | Performed by: INTERNAL MEDICINE

## 2022-05-11 PROCEDURE — 99214 OFFICE O/P EST MOD 30 MIN: CPT | Mod: S$GLB,,, | Performed by: INTERNAL MEDICINE

## 2022-05-11 PROCEDURE — 3074F SYST BP LT 130 MM HG: CPT | Mod: CPTII,S$GLB,, | Performed by: INTERNAL MEDICINE

## 2022-05-11 PROCEDURE — 84443 ASSAY THYROID STIM HORMONE: CPT | Performed by: INTERNAL MEDICINE

## 2022-05-11 PROCEDURE — 3078F PR MOST RECENT DIASTOLIC BLOOD PRESSURE < 80 MM HG: ICD-10-PCS | Mod: CPTII,S$GLB,, | Performed by: INTERNAL MEDICINE

## 2022-05-11 PROCEDURE — 80048 BASIC METABOLIC PNL TOTAL CA: CPT | Performed by: INTERNAL MEDICINE

## 2022-05-11 PROCEDURE — 1126F PR PAIN SEVERITY QUANTIFIED, NO PAIN PRESENT: ICD-10-PCS | Mod: CPTII,S$GLB,, | Performed by: INTERNAL MEDICINE

## 2022-05-11 PROCEDURE — 1160F RVW MEDS BY RX/DR IN RCRD: CPT | Mod: CPTII,S$GLB,, | Performed by: INTERNAL MEDICINE

## 2022-05-11 PROCEDURE — 3078F DIAST BP <80 MM HG: CPT | Mod: CPTII,S$GLB,, | Performed by: INTERNAL MEDICINE

## 2022-05-11 PROCEDURE — 99214 PR OFFICE/OUTPT VISIT, EST, LEVL IV, 30-39 MIN: ICD-10-PCS | Mod: S$GLB,,, | Performed by: INTERNAL MEDICINE

## 2022-05-11 PROCEDURE — 1159F PR MEDICATION LIST DOCUMENTED IN MEDICAL RECORD: ICD-10-PCS | Mod: CPTII,S$GLB,, | Performed by: INTERNAL MEDICINE

## 2022-05-11 PROCEDURE — 4010F ACE/ARB THERAPY RXD/TAKEN: CPT | Mod: CPTII,S$GLB,, | Performed by: INTERNAL MEDICINE

## 2022-05-11 PROCEDURE — 3288F PR FALLS RISK ASSESSMENT DOCUMENTED: ICD-10-PCS | Mod: CPTII,S$GLB,, | Performed by: INTERNAL MEDICINE

## 2022-05-11 PROCEDURE — 1159F MED LIST DOCD IN RCRD: CPT | Mod: CPTII,S$GLB,, | Performed by: INTERNAL MEDICINE

## 2022-05-11 PROCEDURE — 1160F PR REVIEW ALL MEDS BY PRESCRIBER/CLIN PHARMACIST DOCUMENTED: ICD-10-PCS | Mod: CPTII,S$GLB,, | Performed by: INTERNAL MEDICINE

## 2022-05-11 PROCEDURE — 3288F FALL RISK ASSESSMENT DOCD: CPT | Mod: CPTII,S$GLB,, | Performed by: INTERNAL MEDICINE

## 2022-05-11 PROCEDURE — 84466 ASSAY OF TRANSFERRIN: CPT | Performed by: INTERNAL MEDICINE

## 2022-05-11 PROCEDURE — 99999 PR PBB SHADOW E&M-EST. PATIENT-LVL II: CPT | Mod: PBBFAC,,, | Performed by: INTERNAL MEDICINE

## 2022-05-11 NOTE — H&P (VIEW-ONLY)
"[facility]    CC: Anemia    HPI 76 y.o. female with history of epilepsy, chronic back pain and PUD, presents for follow up of chronic anemia due to both iron and Vitamin B12 deficiencies. She stopped taking both iron and Vitamin B12 1-2 weeks ago as they were making her nauseous and now complains of significant fatigue. She denies change in bowel habits, melena, hematochezia or hematemesis. She stopped daily Protonix and now takes Pepcid AC most days. Her last EGD and colonoscopy were performed in January 2022, colonoscopy was unremarkable and EGD Was notable for oozing gastric ulcer treated with cautery (biopsies negative for H.pylori). She has not had follow up EGD as recommended.       Past Medical History:   Diagnosis Date    Anemia     Arthritis     Bleeding ulcer 07/2016    Chronic pain     DDD (degenerative disc disease), cervical     DDD (degenerative disc disease), lumbar     Depression     Disc degeneration, lumbosacral     Diverticulitis     Encounter for blood transfusion     Hypertension     IBS (irritable bowel syndrome)     Neuropathy of both feet     Seizures     none  since 2017    Umbilical hernia 2020       Allergies and Medications reviewed     Review of Systems  General ROS: negative for - chills, fever or weight loss; + fatigue  Cardiovascular ROS: no chest pain or dyspnea on exertion  Gastrointestinal ROS: no abdominal pain, no melena, no hematochezia    Physical Examination  /79   Pulse 77   Ht 5' 6" (1.676 m)   Wt 69.8 kg (153 lb 14.1 oz)   BMI 24.84 kg/m²   General appearance: alert, cooperative, no distress; pale, fatigued appaering  HENT: Normocephalic, atraumatic, neck symmetrical, no nasal discharge, sclera anicteric   Lungs: clear to auscultation bilaterally, symmetric chest wall expansion bilaterally  Heart: regular rate and rhythm without rub; no displacement of the PMI   Abdomen: soft, nontender,nondistended, BS active, no masses " appreciated   Extremities: extremities symmetric; no clubbing, cyanosis, or edema        Labs:  January 2022- Hgb- 7.4, Vitamin B12- 182, Iron- 14    Imaging:  CT abdomen/pelvis 2020 was independently visualized and reviewed by me and showed 1. No acute findings throughout the abdomen and pelvis.  2. Diverticulosis.  3. Unchanged left renal calculus.    Assessment:   76 y.o. female with history of gastric ulcer presents for follow up of significant iron and Vitamin B12 deficiency anemia. She is not compliant with medications and is fatigued.   Plan:  -Check labs  -Schedule EGD    Abby Landers MD  [facility]  [address], [address]  [place] [address]  Office: [phone]  Fax: [phone]

## 2022-05-11 NOTE — PROGRESS NOTES
"[facility]    CC: Anemia    HPI 76 y.o. female with history of epilepsy, chronic back pain and PUD, presents for follow up of chronic anemia due to both iron and Vitamin B12 deficiencies. She stopped taking both iron and Vitamin B12 1-2 weeks ago as they were making her nauseous and now complains of significant fatigue. She denies change in bowel habits, melena, hematochezia or hematemesis. She stopped daily Protonix and now takes Pepcid AC most days. Her last EGD and colonoscopy were performed in January 2022, colonoscopy was unremarkable and EGD Was notable for oozing gastric ulcer treated with cautery (biopsies negative for H.pylori). She has not had follow up EGD as recommended.       Past Medical History:   Diagnosis Date    Anemia     Arthritis     Bleeding ulcer 07/2016    Chronic pain     DDD (degenerative disc disease), cervical     DDD (degenerative disc disease), lumbar     Depression     Disc degeneration, lumbosacral     Diverticulitis     Encounter for blood transfusion     Hypertension     IBS (irritable bowel syndrome)     Neuropathy of both feet     Seizures     none  since 2017    Umbilical hernia 2020       Allergies and Medications reviewed     Review of Systems  General ROS: negative for - chills, fever or weight loss; + fatigue  Cardiovascular ROS: no chest pain or dyspnea on exertion  Gastrointestinal ROS: no abdominal pain, no melena, no hematochezia    Physical Examination  /79   Pulse 77   Ht 5' 6" (1.676 m)   Wt 69.8 kg (153 lb 14.1 oz)   BMI 24.84 kg/m²   General appearance: alert, cooperative, no distress; pale, fatigued appaering  HENT: Normocephalic, atraumatic, neck symmetrical, no nasal discharge, sclera anicteric   Lungs: clear to auscultation bilaterally, symmetric chest wall expansion bilaterally  Heart: regular rate and rhythm without rub; no displacement of the PMI   Abdomen: soft, nontender,nondistended, BS active, no masses " appreciated   Extremities: extremities symmetric; no clubbing, cyanosis, or edema        Labs:  January 2022- Hgb- 7.4, Vitamin B12- 182, Iron- 14    Imaging:  CT abdomen/pelvis 2020 was independently visualized and reviewed by me and showed 1. No acute findings throughout the abdomen and pelvis.  2. Diverticulosis.  3. Unchanged left renal calculus.    Assessment:   76 y.o. female with history of gastric ulcer presents for follow up of significant iron and Vitamin B12 deficiency anemia. She is not compliant with medications and is fatigued.   Plan:  -Check labs  -Schedule EGD    Abby Landers MD  [facility]  [address], [address]  [place] [address]  Office: [phone]  Fax: [phone]

## 2022-05-12 DIAGNOSIS — D50.0 IRON DEFICIENCY ANEMIA DUE TO CHRONIC BLOOD LOSS: Primary | ICD-10-CM

## 2022-05-12 LAB
IRON SERPL-MCNC: 42 UG/DL (ref 30–160)
SATURATED IRON: 9 % (ref 20–50)
TOTAL IRON BINDING CAPACITY: 448 UG/DL (ref 250–450)
TRANSFERRIN SERPL-MCNC: 303 MG/DL (ref 200–375)

## 2022-05-13 ENCOUNTER — TELEPHONE (OUTPATIENT)
Dept: HEMATOLOGY/ONCOLOGY | Facility: CLINIC | Age: 77
End: 2022-05-13
Payer: MEDICARE

## 2022-05-13 LAB
ANNOTATION COMMENT IMP: NORMAL
IF BLOCK AB SER QL: NEGATIVE
PCA AB TITR SER IF: NORMAL {TITER}

## 2022-05-17 ENCOUNTER — TELEPHONE (OUTPATIENT)
Dept: HEMATOLOGY/ONCOLOGY | Facility: CLINIC | Age: 77
End: 2022-05-17
Payer: MEDICARE

## 2022-05-17 NOTE — NURSING
Spoke to Mrs Ray regarding referral to Hematology appt scheduled time and location confirmed she verbalized understanding   Oncology Navigation   Intake  Cancer Type: Benign hem (FAISAL)  Internal / External Referral: Internal (Workqueue)  Referral Source: Dr.Adrienne Landers  Date of Referral: 5/12/2022  Initial Nurse Navigator Contact: 5/17/2022  Referral to Initial Contact Timeline (days): 5  Date Worked: 5/17/2022  First Appointment Available: 5/18/2022  Appointment Date: 5/18/2022 (Dr. Corrales)  First Available Date vs. Scheduled Date (days): 0     Treatment                              Acuity      Follow Up  No follow-ups on file.

## 2022-05-18 ENCOUNTER — TELEPHONE (OUTPATIENT)
Dept: HEMATOLOGY/ONCOLOGY | Facility: CLINIC | Age: 77
End: 2022-05-18
Payer: MEDICARE

## 2022-05-18 ENCOUNTER — OFFICE VISIT (OUTPATIENT)
Dept: HEMATOLOGY/ONCOLOGY | Facility: CLINIC | Age: 77
End: 2022-05-18
Payer: MEDICARE

## 2022-05-18 VITALS
TEMPERATURE: 97 F | DIASTOLIC BLOOD PRESSURE: 73 MMHG | HEART RATE: 89 BPM | SYSTOLIC BLOOD PRESSURE: 138 MMHG | WEIGHT: 166 LBS | RESPIRATION RATE: 16 BRPM | OXYGEN SATURATION: 95 % | BODY MASS INDEX: 26.79 KG/M2

## 2022-05-18 DIAGNOSIS — I10 PRIMARY HYPERTENSION: Primary | ICD-10-CM

## 2022-05-18 DIAGNOSIS — K59.03 DRUG-INDUCED CONSTIPATION: ICD-10-CM

## 2022-05-18 DIAGNOSIS — E53.8 B12 DEFICIENCY: ICD-10-CM

## 2022-05-18 DIAGNOSIS — D50.0 IRON DEFICIENCY ANEMIA DUE TO CHRONIC BLOOD LOSS: ICD-10-CM

## 2022-05-18 PROCEDURE — 1100F PTFALLS ASSESS-DOCD GE2>/YR: CPT | Mod: CPTII,S$GLB,, | Performed by: INTERNAL MEDICINE

## 2022-05-18 PROCEDURE — 4010F ACE/ARB THERAPY RXD/TAKEN: CPT | Mod: CPTII,S$GLB,, | Performed by: INTERNAL MEDICINE

## 2022-05-18 PROCEDURE — 3288F PR FALLS RISK ASSESSMENT DOCUMENTED: ICD-10-PCS | Mod: CPTII,S$GLB,, | Performed by: INTERNAL MEDICINE

## 2022-05-18 PROCEDURE — 3078F DIAST BP <80 MM HG: CPT | Mod: CPTII,S$GLB,, | Performed by: INTERNAL MEDICINE

## 2022-05-18 PROCEDURE — 3008F PR BODY MASS INDEX (BMI) DOCUMENTED: ICD-10-PCS | Mod: CPTII,S$GLB,, | Performed by: INTERNAL MEDICINE

## 2022-05-18 PROCEDURE — 1126F AMNT PAIN NOTED NONE PRSNT: CPT | Mod: CPTII,S$GLB,, | Performed by: INTERNAL MEDICINE

## 2022-05-18 PROCEDURE — 1100F PR PT FALLS ASSESS DOC 2+ FALLS/FALL W/INJURY/YR: ICD-10-PCS | Mod: CPTII,S$GLB,, | Performed by: INTERNAL MEDICINE

## 2022-05-18 PROCEDURE — 99999 PR PBB SHADOW E&M-EST. PATIENT-LVL IV: CPT | Mod: PBBFAC,,, | Performed by: INTERNAL MEDICINE

## 2022-05-18 PROCEDURE — 3075F SYST BP GE 130 - 139MM HG: CPT | Mod: CPTII,S$GLB,, | Performed by: INTERNAL MEDICINE

## 2022-05-18 PROCEDURE — 3008F BODY MASS INDEX DOCD: CPT | Mod: CPTII,S$GLB,, | Performed by: INTERNAL MEDICINE

## 2022-05-18 PROCEDURE — 3288F FALL RISK ASSESSMENT DOCD: CPT | Mod: CPTII,S$GLB,, | Performed by: INTERNAL MEDICINE

## 2022-05-18 PROCEDURE — 1159F MED LIST DOCD IN RCRD: CPT | Mod: CPTII,S$GLB,, | Performed by: INTERNAL MEDICINE

## 2022-05-18 PROCEDURE — 1126F PR PAIN SEVERITY QUANTIFIED, NO PAIN PRESENT: ICD-10-PCS | Mod: CPTII,S$GLB,, | Performed by: INTERNAL MEDICINE

## 2022-05-18 PROCEDURE — 4010F PR ACE/ARB THEARPY RXD/TAKEN: ICD-10-PCS | Mod: CPTII,S$GLB,, | Performed by: INTERNAL MEDICINE

## 2022-05-18 PROCEDURE — 99204 OFFICE O/P NEW MOD 45 MIN: CPT | Mod: S$GLB,,, | Performed by: INTERNAL MEDICINE

## 2022-05-18 PROCEDURE — 3078F PR MOST RECENT DIASTOLIC BLOOD PRESSURE < 80 MM HG: ICD-10-PCS | Mod: CPTII,S$GLB,, | Performed by: INTERNAL MEDICINE

## 2022-05-18 PROCEDURE — 3075F PR MOST RECENT SYSTOLIC BLOOD PRESS GE 130-139MM HG: ICD-10-PCS | Mod: CPTII,S$GLB,, | Performed by: INTERNAL MEDICINE

## 2022-05-18 PROCEDURE — 99204 PR OFFICE/OUTPT VISIT, NEW, LEVL IV, 45-59 MIN: ICD-10-PCS | Mod: S$GLB,,, | Performed by: INTERNAL MEDICINE

## 2022-05-18 PROCEDURE — 99999 PR PBB SHADOW E&M-EST. PATIENT-LVL IV: ICD-10-PCS | Mod: PBBFAC,,, | Performed by: INTERNAL MEDICINE

## 2022-05-18 PROCEDURE — 1159F PR MEDICATION LIST DOCUMENTED IN MEDICAL RECORD: ICD-10-PCS | Mod: CPTII,S$GLB,, | Performed by: INTERNAL MEDICINE

## 2022-05-18 RX ORDER — ACETAMINOPHEN 325 MG/1
650 TABLET ORAL
Status: CANCELLED
Start: 2022-05-18

## 2022-05-18 RX ORDER — SODIUM CHLORIDE 0.9 % (FLUSH) 0.9 %
10 SYRINGE (ML) INJECTION
Status: CANCELLED | OUTPATIENT
Start: 2022-05-18

## 2022-05-18 RX ORDER — HEPARIN 100 UNIT/ML
500 SYRINGE INTRAVENOUS
Status: CANCELLED | OUTPATIENT
Start: 2022-05-18

## 2022-05-18 RX ORDER — DIPHENHYDRAMINE HCL 25 MG
25 CAPSULE ORAL
Status: CANCELLED
Start: 2022-05-18

## 2022-05-18 NOTE — PROGRESS NOTES
HPI      76 years old female referred to [facility]ology Clinic for evaluation of iron deficiency anemia.  Patient has irritable bowel syndrome, bleeding ulcer, hypertension, and peripheral neuropathy of both feet.    Patient has hypertension is on water pill.  She is complaining of fatigue.    Past Medical History:   Diagnosis Date    Anemia     Arthritis     Bleeding ulcer 07/2016    Chronic pain     DDD (degenerative disc disease), cervical     DDD (degenerative disc disease), lumbar     Depression     Disc degeneration, lumbosacral     Diverticulitis     Encounter for blood transfusion     Hypertension     IBS (irritable bowel syndrome)     Neuropathy of both feet     Seizures     none  since 2017    Umbilical hernia 2020     Social History     Socioeconomic History    Marital status:    Tobacco Use    Smoking status: Never Smoker    Smokeless tobacco: Never Used   Substance and Sexual Activity    Alcohol use: No    Drug use: No    Sexual activity: Not Currently         Subjective      Review of Systems   Constitutional:  Fatigue   HENT: Negative for mouth sores.   Eyes: Negative for visual disturbance.   Respiratory: Negative for cough and shortness of breath.   Cardiovascular: Negative for chest pain.   Gastrointestinal: Negative for diarrhea.   Genitourinary: Negative for frequency.   Musculoskeletal: Negative for back pain.   Skin: Negative for rash.   Neurological: Negative for headaches.   Hematological: Negative for adenopathy.   Psychiatric/Behavioral: The patient is not nervous/anxious.   All other systems reviewed and are negative.     Objective    Physical Exam   Vitals:    05/18/22 1310   BP: 138/73   Pulse: 89   Resp: 16   Temp: 97.4 °F (36.3 °C)       Awake alert no acute distress  Normocephalic atraumatic  Pupils equal round reactive  Soft nontender nondistended  Extraocular muscle intact  No rash no lesions  Nonfocal      CMP  Sodium   Date Value Ref Range Status    05/11/2022 139 136 - 145 mmol/L Final     Potassium   Date Value Ref Range Status   05/11/2022 4.3 3.5 - 5.1 mmol/L Final     Chloride   Date Value Ref Range Status   05/11/2022 105 95 - 110 mmol/L Final     CO2   Date Value Ref Range Status   05/11/2022 25 23 - 29 mmol/L Final     Glucose   Date Value Ref Range Status   05/11/2022 89 70 - 110 mg/dL Final     BUN   Date Value Ref Range Status   05/11/2022 16 8 - 23 mg/dL Final     Creatinine   Date Value Ref Range Status   05/11/2022 0.8 0.5 - 1.4 mg/dL Final     Calcium   Date Value Ref Range Status   05/11/2022 9.2 8.7 - 10.5 mg/dL Final     Total Protein   Date Value Ref Range Status   01/15/2022 5.0 (L) 6.0 - 8.4 g/dL Final     Albumin   Date Value Ref Range Status   01/15/2022 2.8 (L) 3.5 - 5.2 g/dL Final     Total Bilirubin   Date Value Ref Range Status   01/15/2022 0.4 0.1 - 1.0 mg/dL Final     Comment:     For infants and newborns, interpretation of results should be based  on gestational age, weight and in agreement with clinical  observations.    Premature Infant recommended reference ranges:  Up to 24 hours.............<8.0 mg/dL  Up to 48 hours............<12.0 mg/dL  3-5 days..................<15.0 mg/dL  6-29 days.................<15.0 mg/dL       Alkaline Phosphatase   Date Value Ref Range Status   01/15/2022 58 55 - 135 U/L Final     AST   Date Value Ref Range Status   01/15/2022 11 10 - 40 U/L Final     ALT   Date Value Ref Range Status   01/15/2022 12 10 - 44 U/L Final     Anion Gap   Date Value Ref Range Status   05/11/2022 9 8 - 16 mmol/L Final     eGFR if    Date Value Ref Range Status   05/11/2022 >60 >60 mL/min/1.73 m^2 Final     eGFR if non    Date Value Ref Range Status   05/11/2022 >60 >60 mL/min/1.73 m^2 Final     Comment:     Calculation used to obtain the estimated glomerular filtration  rate (eGFR) is the CKD-EPI equation.        Lab Results   Component Value Date    WBC 5.38 05/11/2022    HGB 11.3 (L)  05/11/2022    HCT 35.1 (L) 05/11/2022    MCV 91 05/11/2022     05/11/2022       Intrinsic factor blocking antibodies negative    Anti parietal antibodies negative    Folate normal    Vitamin B12 198     Ferritin 9    Iron saturation 9%    Assessment    Anemia    Vitamin B12 deficiency    Patient previously have tried oral iron supplement as well as vitamin B12 which cause significant nausea which lead to noncompliance.  Her hemoglobin a anemia is mildly low.  Her iron storage is supple abdomen however serum iron concentration is okay.    Fatigue    Plan    Given the circumstance we can try IV iron 200 Venofer with premedication Tylenol and Benadryl x3.  Afterwards I will see patient in 5 weeks for evaluation.  At the meantime patient is instructed to take Slow iron Monday through Friday.    I am also encouraged patient to continue taking oral vitamin B        Iron deficiency anemia due to chronic blood loss  -     Ambulatory referral/consult to Hematology / Oncology

## 2022-05-19 ENCOUNTER — TELEPHONE (OUTPATIENT)
Dept: HEMATOLOGY/ONCOLOGY | Facility: CLINIC | Age: 77
End: 2022-05-19
Payer: MEDICARE

## 2022-05-19 NOTE — TELEPHONE ENCOUNTER
Outgoing staff message to Sammi Encarnacion RN regarding auth for this patient. See message below.

## 2022-05-19 NOTE — TELEPHONE ENCOUNTER
Outgoing staff message to [facility] scheduling regarding a schedule for this patient's IV iron. See message below.

## 2022-05-30 ENCOUNTER — INFUSION (OUTPATIENT)
Dept: INFUSION THERAPY | Facility: HOSPITAL | Age: 77
End: 2022-05-30
Attending: INTERNAL MEDICINE
Payer: MEDICARE

## 2022-05-30 VITALS
RESPIRATION RATE: 18 BRPM | DIASTOLIC BLOOD PRESSURE: 74 MMHG | TEMPERATURE: 99 F | OXYGEN SATURATION: 98 % | HEART RATE: 89 BPM | HEIGHT: 66 IN | SYSTOLIC BLOOD PRESSURE: 126 MMHG | WEIGHT: 163.38 LBS | BODY MASS INDEX: 26.26 KG/M2

## 2022-05-30 DIAGNOSIS — D50.0 IRON DEFICIENCY ANEMIA DUE TO CHRONIC BLOOD LOSS: Primary | ICD-10-CM

## 2022-05-30 DIAGNOSIS — K25.4 CHRONIC GASTRIC ULCER WITH BLEEDING: ICD-10-CM

## 2022-05-30 PROCEDURE — 96365 THER/PROPH/DIAG IV INF INIT: CPT

## 2022-05-30 PROCEDURE — 63600175 PHARM REV CODE 636 W HCPCS: Performed by: INTERNAL MEDICINE

## 2022-05-30 PROCEDURE — 25000003 PHARM REV CODE 250: Performed by: INTERNAL MEDICINE

## 2022-05-30 RX ORDER — HEPARIN 100 UNIT/ML
500 SYRINGE INTRAVENOUS
Status: CANCELLED | OUTPATIENT
Start: 2022-06-06

## 2022-05-30 RX ORDER — SODIUM CHLORIDE 0.9 % (FLUSH) 0.9 %
10 SYRINGE (ML) INJECTION
Status: DISCONTINUED | OUTPATIENT
Start: 2022-05-30 | End: 2022-05-30 | Stop reason: HOSPADM

## 2022-05-30 RX ORDER — ACETAMINOPHEN 325 MG/1
650 TABLET ORAL
Status: CANCELLED
Start: 2022-06-06

## 2022-05-30 RX ORDER — SODIUM CHLORIDE 0.9 % (FLUSH) 0.9 %
10 SYRINGE (ML) INJECTION
Status: CANCELLED | OUTPATIENT
Start: 2022-06-06

## 2022-05-30 RX ORDER — DIPHENHYDRAMINE HCL 25 MG
25 CAPSULE ORAL
Status: CANCELLED
Start: 2022-06-06

## 2022-05-30 RX ORDER — DIPHENHYDRAMINE HCL 25 MG
25 CAPSULE ORAL
Status: COMPLETED | OUTPATIENT
Start: 2022-05-30 | End: 2022-05-30

## 2022-05-30 RX ORDER — ACETAMINOPHEN 325 MG/1
650 TABLET ORAL
Status: COMPLETED | OUTPATIENT
Start: 2022-05-30 | End: 2022-05-30

## 2022-05-30 RX ADMIN — IRON SUCROSE 200 MG: 20 INJECTION, SOLUTION INTRAVENOUS at 02:05

## 2022-05-30 RX ADMIN — DIPHENHYDRAMINE HYDROCHLORIDE 25 MG: 25 CAPSULE ORAL at 02:05

## 2022-05-30 RX ADMIN — SODIUM CHLORIDE: 9 INJECTION, SOLUTION INTRAVENOUS at 02:05

## 2022-05-30 RX ADMIN — ACETAMINOPHEN 650 MG: 325 TABLET ORAL at 02:05

## 2022-05-30 NOTE — PLAN OF CARE
Problem: Fall Injury Risk  Goal: Absence of Fall and Fall-Related Injury  Outcome: Ongoing, Progressing  Intervention: Identify and Manage Contributors  Flowsheets (Taken 5/30/2022 1355)  Self-Care Promotion: independence encouraged  Medication Review/Management: medications reviewed  Intervention: Promote Injury-Free Environment  Flowsheets (Taken 5/30/2022 1355)  Safety Promotion/Fall Prevention: medications reviewed

## 2022-06-02 ENCOUNTER — TELEPHONE (OUTPATIENT)
Dept: PAIN MEDICINE | Facility: CLINIC | Age: 77
End: 2022-06-02
Payer: MEDICARE

## 2022-06-02 NOTE — TELEPHONE ENCOUNTER
----- Message from Ilya Rachel sent at 6/2/2022 12:37 PM CDT -----  Contact: pt at [phone]  Type: Needs Medical Advice  Who Called:  pt  Pharmacy name and phone #:    [id number] - [place], [place] - [address]  [address]  [place] [address]  Phone: [phone] Fax: [phone]  Best Call Back Number: [phone]  Additional Information: pt needs the Dr to call the pharmacy and okay her getting her Rx for oxyCODONE-acetaminophen (PERCOCET)  mg per tablet early. Please call back and advise.

## 2022-06-02 NOTE — TELEPHONE ENCOUNTER
Informed pt that she cannot get her medication filled early and she needs to wait until her refill date. Informed pt that she needs to take her medication as directed so she does not run out in the future. Verbalizes understanding.

## 2022-06-06 ENCOUNTER — INFUSION (OUTPATIENT)
Dept: INFUSION THERAPY | Facility: HOSPITAL | Age: 77
End: 2022-06-06
Attending: INTERNAL MEDICINE
Payer: MEDICARE

## 2022-06-06 VITALS
DIASTOLIC BLOOD PRESSURE: 84 MMHG | WEIGHT: 162.13 LBS | RESPIRATION RATE: 18 BRPM | SYSTOLIC BLOOD PRESSURE: 133 MMHG | HEART RATE: 83 BPM | TEMPERATURE: 98 F | BODY MASS INDEX: 26.06 KG/M2 | HEIGHT: 66 IN | OXYGEN SATURATION: 99 %

## 2022-06-06 DIAGNOSIS — D50.0 IRON DEFICIENCY ANEMIA DUE TO CHRONIC BLOOD LOSS: Primary | ICD-10-CM

## 2022-06-06 DIAGNOSIS — K25.4 CHRONIC GASTRIC ULCER WITH BLEEDING: ICD-10-CM

## 2022-06-06 PROCEDURE — 25000003 PHARM REV CODE 250: Performed by: INTERNAL MEDICINE

## 2022-06-06 PROCEDURE — 63600175 PHARM REV CODE 636 W HCPCS: Performed by: INTERNAL MEDICINE

## 2022-06-06 PROCEDURE — 96365 THER/PROPH/DIAG IV INF INIT: CPT

## 2022-06-06 RX ORDER — DIPHENHYDRAMINE HCL 25 MG
25 CAPSULE ORAL
Status: COMPLETED | OUTPATIENT
Start: 2022-06-06 | End: 2022-06-06

## 2022-06-06 RX ORDER — DIPHENHYDRAMINE HCL 25 MG
25 CAPSULE ORAL
Status: CANCELLED
Start: 2022-06-13

## 2022-06-06 RX ORDER — SODIUM CHLORIDE 0.9 % (FLUSH) 0.9 %
10 SYRINGE (ML) INJECTION
Status: CANCELLED | OUTPATIENT
Start: 2022-06-13

## 2022-06-06 RX ORDER — ACETAMINOPHEN 325 MG/1
650 TABLET ORAL
Status: CANCELLED
Start: 2022-06-13

## 2022-06-06 RX ORDER — HEPARIN 100 UNIT/ML
500 SYRINGE INTRAVENOUS
Status: CANCELLED | OUTPATIENT
Start: 2022-06-13

## 2022-06-06 RX ORDER — SODIUM CHLORIDE 0.9 % (FLUSH) 0.9 %
10 SYRINGE (ML) INJECTION
Status: DISCONTINUED | OUTPATIENT
Start: 2022-06-06 | End: 2022-06-06 | Stop reason: HOSPADM

## 2022-06-06 RX ORDER — HEPARIN 100 UNIT/ML
500 SYRINGE INTRAVENOUS
Status: DISCONTINUED | OUTPATIENT
Start: 2022-06-06 | End: 2022-06-06 | Stop reason: HOSPADM

## 2022-06-06 RX ORDER — ACETAMINOPHEN 325 MG/1
650 TABLET ORAL
Status: COMPLETED | OUTPATIENT
Start: 2022-06-06 | End: 2022-06-06

## 2022-06-06 RX ADMIN — IRON SUCROSE 200 MG: 20 INJECTION, SOLUTION INTRAVENOUS at 11:06

## 2022-06-06 RX ADMIN — DIPHENHYDRAMINE HYDROCHLORIDE 25 MG: 25 CAPSULE ORAL at 11:06

## 2022-06-06 RX ADMIN — ACETAMINOPHEN 650 MG: 325 TABLET ORAL at 11:06

## 2022-06-06 NOTE — PLAN OF CARE
Problem: Fatigue  Goal: Improved Activity Tolerance  Intervention: Promote Improved Energy  Flowsheets (Taken 6/6/2022 1113)  Fatigue Management:   fatigue-related activity identified   frequent rest breaks encouraged  Activity Management: Ambulated -L4

## 2022-06-09 ENCOUNTER — DOCUMENTATION ONLY (OUTPATIENT)
Dept: PAIN MEDICINE | Facility: CLINIC | Age: 77
End: 2022-06-09
Payer: MEDICARE

## 2022-06-10 ENCOUNTER — HOSPITAL ENCOUNTER (OUTPATIENT)
Facility: HOSPITAL | Age: 77
Discharge: HOME | End: 2022-06-10
Attending: INTERNAL MEDICINE | Admitting: INTERNAL MEDICINE
Payer: MEDICARE

## 2022-06-10 ENCOUNTER — ANESTHESIA EVENT (OUTPATIENT)
Dept: ENDOSCOPY | Facility: HOSPITAL | Age: 77
End: 2022-06-10
Payer: MEDICARE

## 2022-06-10 ENCOUNTER — ANESTHESIA (OUTPATIENT)
Dept: ENDOSCOPY | Facility: HOSPITAL | Age: 77
End: 2022-06-10
Payer: MEDICARE

## 2022-06-10 VITALS
HEART RATE: 76 BPM | DIASTOLIC BLOOD PRESSURE: 63 MMHG | TEMPERATURE: 98 F | OXYGEN SATURATION: 96 % | SYSTOLIC BLOOD PRESSURE: 104 MMHG | HEIGHT: 66 IN | WEIGHT: 162 LBS | RESPIRATION RATE: 16 BRPM | BODY MASS INDEX: 26.03 KG/M2

## 2022-06-10 DIAGNOSIS — K25.7 CHRONIC GASTRIC ULCER WITHOUT HEMORRHAGE AND WITHOUT PERFORATION: Primary | ICD-10-CM

## 2022-06-10 DIAGNOSIS — D50.9 IRON DEFICIENCY ANEMIA: ICD-10-CM

## 2022-06-10 PROCEDURE — 25000003 PHARM REV CODE 250: Performed by: NURSE ANESTHETIST, CERTIFIED REGISTERED

## 2022-06-10 PROCEDURE — D9220A PRA ANESTHESIA: Mod: CRNA,,, | Performed by: NURSE ANESTHETIST, CERTIFIED REGISTERED

## 2022-06-10 PROCEDURE — 63600175 PHARM REV CODE 636 W HCPCS: Performed by: NURSE ANESTHETIST, CERTIFIED REGISTERED

## 2022-06-10 PROCEDURE — D9220A PRA ANESTHESIA: Mod: ANES,,, | Performed by: ANESTHESIOLOGY

## 2022-06-10 PROCEDURE — 43239 EGD BIOPSY SINGLE/MULTIPLE: CPT | Performed by: INTERNAL MEDICINE

## 2022-06-10 PROCEDURE — 88342 IMHCHEM/IMCYTCHM 1ST ANTB: CPT | Mod: 26,,, | Performed by: PATHOLOGY

## 2022-06-10 PROCEDURE — 27201012 HC FORCEPS, HOT/COLD, DISP: Performed by: INTERNAL MEDICINE

## 2022-06-10 PROCEDURE — 88342 CHG IMMUNOCYTOCHEMISTRY: ICD-10-PCS | Mod: 26,,, | Performed by: PATHOLOGY

## 2022-06-10 PROCEDURE — 43239 PR EGD, FLEX, W/BIOPSY, SGL/MULTI: ICD-10-PCS | Mod: ,,, | Performed by: INTERNAL MEDICINE

## 2022-06-10 PROCEDURE — D9220A PRA ANESTHESIA: ICD-10-PCS | Mod: CRNA,,, | Performed by: NURSE ANESTHETIST, CERTIFIED REGISTERED

## 2022-06-10 PROCEDURE — 88305 TISSUE EXAM BY PATHOLOGIST: ICD-10-PCS | Mod: 26,,, | Performed by: PATHOLOGY

## 2022-06-10 PROCEDURE — D9220A PRA ANESTHESIA: ICD-10-PCS | Mod: ANES,,, | Performed by: ANESTHESIOLOGY

## 2022-06-10 PROCEDURE — 43239 EGD BIOPSY SINGLE/MULTIPLE: CPT | Mod: ,,, | Performed by: INTERNAL MEDICINE

## 2022-06-10 PROCEDURE — 88305 TISSUE EXAM BY PATHOLOGIST: CPT | Performed by: PATHOLOGY

## 2022-06-10 PROCEDURE — 88342 IMHCHEM/IMCYTCHM 1ST ANTB: CPT | Performed by: PATHOLOGY

## 2022-06-10 PROCEDURE — 25000003 PHARM REV CODE 250: Performed by: INTERNAL MEDICINE

## 2022-06-10 PROCEDURE — 88305 TISSUE EXAM BY PATHOLOGIST: CPT | Mod: 26,,, | Performed by: PATHOLOGY

## 2022-06-10 PROCEDURE — 37000008 HC ANESTHESIA 1ST 15 MINUTES: Performed by: INTERNAL MEDICINE

## 2022-06-10 RX ORDER — LIDOCAINE HCL/PF 100 MG/5ML
SYRINGE (ML) INTRAVENOUS
Status: DISCONTINUED | OUTPATIENT
Start: 2022-06-10 | End: 2022-06-10

## 2022-06-10 RX ORDER — OMEPRAZOLE 40 MG/1
40 CAPSULE, DELAYED RELEASE ORAL
Qty: 60 CAPSULE | Refills: 3 | Status: SHIPPED | OUTPATIENT
Start: 2022-06-10 | End: 2022-09-06

## 2022-06-10 RX ORDER — PROPOFOL 10 MG/ML
VIAL (ML) INTRAVENOUS
Status: DISCONTINUED | OUTPATIENT
Start: 2022-06-10 | End: 2022-06-10

## 2022-06-10 RX ORDER — SODIUM CHLORIDE 9 MG/ML
INJECTION, SOLUTION INTRAVENOUS CONTINUOUS
Status: DISCONTINUED | OUTPATIENT
Start: 2022-06-10 | End: 2022-06-10 | Stop reason: HOSPADM

## 2022-06-10 RX ADMIN — PROPOFOL 40 MG: 10 INJECTION, EMULSION INTRAVENOUS at 02:06

## 2022-06-10 RX ADMIN — LIDOCAINE HYDROCHLORIDE 50 MG: 20 INJECTION INTRAVENOUS at 02:06

## 2022-06-10 RX ADMIN — SODIUM CHLORIDE: 0.9 INJECTION, SOLUTION INTRAVENOUS at 01:06

## 2022-06-10 RX ADMIN — PROPOFOL 80 MG: 10 INJECTION, EMULSION INTRAVENOUS at 02:06

## 2022-06-10 NOTE — TRANSFER OF CARE
"Anesthesia Transfer of Care Note    Patient: Froilan Ray    Procedure(s) Performed: Procedure(s) (LRB):  EGD (ESOPHAGOGASTRODUODENOSCOPY) (N/A)    Patient location: PACU    Anesthesia Type: general    Transport from OR: Transported from OR on 2-3 L/min O2 by NC with adequate spontaneous ventilation    Post pain: adequate analgesia    Post assessment: no apparent anesthetic complications and tolerated procedure well    Post vital signs: stable    Level of consciousness: awake, alert and oriented    Nausea/Vomiting: no nausea/vomiting    Complications: none    Transfer of care protocol was followed      Last vitals:   Visit Vitals  BP (!) 94/53   Pulse 80   Temp 36.7 °C (98.1 °F) (Skin)   Resp 12   Ht 5' 6" (1.676 m)   Wt 73.5 kg (162 lb)   SpO2 100%   Breastfeeding No   BMI 26.15 kg/m²     "

## 2022-06-10 NOTE — ANESTHESIA PREPROCEDURE EVALUATION
06/10/2022  Froilan Ray is a 76 y.o., female.    Patient Active Problem List   Diagnosis    Chronic pain    Encounter for postoperative wound check    Uterine mass    Closed nondisplaced intertrochanteric fracture of left femur    HTN (hypertension)    Peripheral neuropathy    Seizure disorder    Open wound of left heel    Colitis    ACP (advance care planning)    Drug-induced constipation    Disease of biliary tract    Severe anemia    Chronic gastric ulcer with bleeding    Iron deficiency anemia due to chronic blood loss       Past Surgical History:   Procedure Laterality Date    BACK SURGERY      BREAST SURGERY Right     lumpectomy    CAUDAL EPIDURAL STEROID INJECTION N/A 1/28/2022    Procedure: Injection-steroid-epidural-caudal;  Surgeon: Luzmaria Marcelino MD;  Location: [facility] OR;  Service: Pain Management;  Laterality: N/A;    COLONOSCOPY N/A 1/14/2022    Procedure: COLONOSCOPY;  Surgeon: Abby Landers MD;  Location: [facility];  Service: Endoscopy;  Laterality: N/A;    ENDOSCOPIC ULTRASOUND OF UPPER GASTROINTESTINAL TRACT N/A 7/21/2020    Procedure: ULTRASOUND, UPPER GI TRACT, ENDOSCOPIC;  Surgeon: Marcio Nguyễn III, MD;  Location: [facility];  Service: Endoscopy;  Laterality: N/A;    ESOPHAGOGASTRODUODENOSCOPY N/A 1/14/2022    Procedure: EGD (ESOPHAGOGASTRODUODENOSCOPY);  Surgeon: Abby Landers MD;  Location: [facility];  Service: Endoscopy;  Laterality: N/A;    EYE SURGERY      cataract    HYSTERECTOMY      INTRAMEDULLARY RODDING OF TROCHANTER OF FEMUR Left 12/11/2018    Procedure: INSERTION, INTRAMEDULLARY SANTOS, FEMUR, TROCHANTER;  Surgeon: Eulalio De La Cruz MD;  Location: [facility] OR;  Service: Orthopedics;  Laterality: Left;    SPINAL CORD STIMULATOR IMPLANT  09/18/2013    and removal    SPINE SURGERY  2006    lumbar L2-S1 decompression.    SPINE SURGERY       cervical decompression    TONSILLECTOMY          Tobacco Use:  The patient  reports that she has never smoked. She has never used smokeless tobacco.     Results for orders placed or performed during the hospital encounter of 08/22/20   EKG 12-lead    Collection Time: 08/22/20 11:54 AM    Narrative    Test Reason : R53.1,    Vent. Rate : 088 BPM     Atrial Rate : 088 BPM     P-R Int : 114 ms          QRS Dur : 078 ms      QT Int : 348 ms       P-R-T Axes : 033 011 053 degrees     QTc Int : 421 ms     Suspect unspecified pacemaker failure  Normal sinus rhythm  Normal ECG  When compared with ECG of 02-MAR-2020 09:03,  Nonspecific T wave abnormality now evident in Anterior leads  Confirmed by Levi Stafford MD ([id number]) on 8/24/2020 9:50:25 AM    Referred By: [email or web address]   SELF           Confirmed By:Levi Stafford MD             Lab Results   Component Value Date    WBC 5.38 05/11/2022    HGB 11.3 (L) 05/11/2022    HCT 35.1 (L) 05/11/2022    MCV 91 05/11/2022     05/11/2022     BMP  Lab Results   Component Value Date     05/11/2022    K 4.3 05/11/2022     05/11/2022    CO2 25 05/11/2022    BUN 16 05/11/2022    CREATININE 0.8 05/11/2022    CALCIUM 9.2 05/11/2022    ANIONGAP 9 05/11/2022    GLU 89 05/11/2022    GLU 92 01/15/2022    GLU 95 01/14/2022       No results found for this or any previous visit.        Pre-op Assessment    I have reviewed the Patient Summary Reports.    I have reviewed the Nursing Notes. I have reviewed the NPO Status.   I have reviewed the Medications.     Review of Systems  Hematology/Oncology:         -- Anemia:   EENT/Dental:   Eyes: (right eye blindness)  Ears General/Symptom(s) (decrease hearing )    Cardiovascular:   Hypertension    Hepatic/GI:   PUD, (in 2016)  Hepatic/GI Symptoms: (history of colitis in 2017)    Musculoskeletal:   Arthritis (cervical and lumbar DDD)   Musculoskeletal General/Symptoms: (reports limited neck movement, she is s/p neck fusion)     Neurological:   Neuromuscular Disease, (bilateral feet neuropathy) Seizures (last seizure was 3 years ago, and she is no longer on depakote.), well controlled    Psych:   Psychiatric History anxiety      Easy bruising, currently some visible in her arms.      Physical Exam  General:  Well nourished      Airway/Jaw/Neck:  Airway Findings: Mouth Opening: Normal   Tongue: Normal   General Airway Assessment: Adult Mallampati: II  TM Distance: Normal, at least 6 cm   Jaw/Neck Findings:  Neck ROM: Normal ROM      Eyes/Ears/Nose:  Eyes/Ears/Nose Findings:    Dental:  Dental Findings: (reports chipped tooth) In tact     Chest/Lungs:  Chest/Lungs Findings: Clear to auscultation, Normal Respiratory Rate      Heart/Vascular:  Heart Findings: Rate: Normal  Rhythm: Regular Rhythm  Sounds: Normal     Abdomen:  Abdomen Findings:     Musculoskeletal:  Musculoskeletal Findings:    Skin:  Skin Findings:     Mental Status:  Mental Status Findings:  Cooperative, Alert and Oriented         Anesthesia Plan  Type of Anesthesia, risks & benefits discussed:  Anesthesia Type:  Gen Natural Airway    Patient's Preference:   Plan Factors:          Intra-op Monitoring Plan: standard ASA monitors  Intra-op Monitoring Plan Comments:   Post Op Pain Control Plan:   Post Op Pain Control Plan Comments:     Induction:   IV  Beta Blocker:  Patient is on a Beta-Blocker and has received one dose within the past 24 hours (No further documentation required).       Informed Consent: Informed consent signed with the Patient and all parties understand the risks and agree with anesthesia plan.  All questions answered.  Anesthesia consent signed with patient.  ASA Score: 3     Day of Surgery Review of History & Physical: I have interviewed and examined the patient. I have reviewed the patient's H&P dated:              Ready For Surgery From Anesthesia Perspective.           Physical Exam  General: Well nourished    Airway:  Mallampati: II   Mouth Opening:  Normal  TM Distance: Normal, at least 6 cm  Tongue: Normal  Neck ROM: Normal ROM    Dental:  In tact    Chest/Lungs:  Clear to auscultation, Normal Respiratory Rate    Heart:  Rate: Normal  Rhythm: Regular Rhythm  Sounds: Normal          Anesthesia Plan  Type of Anesthesia, risks & benefits discussed:    Anesthesia Type: Gen Natural Airway  Intra-op Monitoring Plan: standard ASA monitors  Induction:  IV  Informed Consent: Informed consent signed with the Patient and all parties understand the risks and agree with anesthesia plan.  All questions answered.   ASA Score: 3  Day of Surgery Review of History & Physical: I have interviewed and examined the patient. I have reviewed the patient's H&P dated:     Ready For Surgery From Anesthesia Perspective.       .

## 2022-06-10 NOTE — PLAN OF CARE
Vss, bhumika po fluids, denies pain, ambulates easily. IV removed, catheter intact. Discharge instructions provided and states understanding. States ready to go home.  Discharged from facility with family per wheelchair.

## 2022-06-10 NOTE — PROVATION PATIENT INSTRUCTIONS
Discharge Summary/Instructions after an Endoscopic Procedure  Patient Name: Froilan Ray  Patient MRN: [id number]  Patient YOB: 1945  Friday, Yanira 10, 2022  Abby Landers MD  Dear patient,  As a result of recent federal legislation (The Federal Cures Act), you may   receive lab or pathology results from your procedure in your [facility]   account before your physician is able to contact you. Your physician or   their representative will relay the results to you with their   recommendations at their soonest availability.  Thank you,  RESTRICTIONS:  During your procedure today, you received medications for sedation.  These   medications may affect your judgment, balance and coordination.  Therefore,   for 24 hours, you have the following restrictions:   - DO NOT drive a car, operate machinery, make legal/financial decisions,   sign important papers or drink alcohol.    ACTIVITY:  Today: no heavy lifting, straining or running due to procedural   sedation/anesthesia.  The following day: return to full activity including work.  DIET:  Eat and drink normally unless instructed otherwise.     TREATMENT FOR COMMON SIDE EFFECTS:  - Mild abdominal pain, nausea, belching, bloating or excessive gas:  rest,   eat lightly and use a heating pad.  - Sore Throat: treat with throat lozenges and/or gargle with warm salt   water.  - Because air was used during the procedure, expelling large amounts of air   from your rectum or belching is normal.  - If a bowel prep was taken, you may not have a bowel movement for 1-3 days.    This is normal.  SYMPTOMS TO WATCH FOR AND REPORT TO YOUR PHYSICIAN:  1. Abdominal pain or bloating, other than gas cramps.  2. Chest pain.  3. Back pain.  4. Signs of infection such as: chills or fever occurring within 24 hours   after the procedure.  5. Rectal bleeding, which would show as bright red, maroon, or black stools.   (A tablespoon of blood from the rectum is not serious,  especially if   hemorrhoids are present.)  6. Vomiting.  7. Weakness or dizziness.  GO DIRECTLY TO THE NEAREST EMERGENCY ROOM IF YOU HAVE ANY OF THE FOLLOWING:      Difficulty breathing              Chills and/or fever over 101 F   Persistent vomiting and/or vomiting blood   Severe abdominal pain   Severe chest pain   Black, tarry stools   Bleeding- more than one tablespoon   Any other symptom or condition that you feel may need urgent attention  Your doctor recommends these additional instructions:  If any biopsies were taken, your doctors clinic will contact you in 1 to 2   weeks with any results.  - Await pathology results.   - Discharge patient to home (with escort).   - Patient has a contact number available for emergencies.  The signs and   symptoms of potential delayed complications were discussed with the   patient.  Return to normal activities tomorrow.  Written discharge   instructions were provided to the patient.   - Resume previous diet.   - Resume BID PPI  -Repeat EGD in 8 weeks to ensure ulcer has healed  -Avoid NSAIDs  For questions, problems or results please call your physician - Abby Landers MD at Work:  [phone].  [facility], EMERGENCY ROOM PHONE NUMBER: [phone]  IF A COMPLICATION OR EMERGENCY SITUATION ARISES AND YOU ARE UNABLE TO REACH   YOUR PHYSICIAN - GO DIRECTLY TO THE EMERGENCY ROOM.  Abby Landers MD  6/10/2022 2:15:50 PM  This report has been verified and signed electronically.  Dear patient,  As a result of recent federal legislation (The Federal Cures Act), you may   receive lab or pathology results from your procedure in your [facility]   account before your physician is able to contact you. Your physician or   their representative will relay the results to you with their   recommendations at their soonest availability.  Thank you,  PROVATION

## 2022-06-13 ENCOUNTER — INFUSION (OUTPATIENT)
Dept: INFUSION THERAPY | Facility: HOSPITAL | Age: 77
End: 2022-06-13
Attending: INTERNAL MEDICINE
Payer: MEDICARE

## 2022-06-13 VITALS
BODY MASS INDEX: 26.76 KG/M2 | OXYGEN SATURATION: 100 % | HEART RATE: 80 BPM | TEMPERATURE: 98 F | SYSTOLIC BLOOD PRESSURE: 125 MMHG | RESPIRATION RATE: 16 BRPM | DIASTOLIC BLOOD PRESSURE: 72 MMHG | HEIGHT: 66 IN | WEIGHT: 166.5 LBS

## 2022-06-13 DIAGNOSIS — D50.0 IRON DEFICIENCY ANEMIA DUE TO CHRONIC BLOOD LOSS: Primary | ICD-10-CM

## 2022-06-13 DIAGNOSIS — K25.4 CHRONIC GASTRIC ULCER WITH BLEEDING: ICD-10-CM

## 2022-06-13 PROCEDURE — A4216 STERILE WATER/SALINE, 10 ML: HCPCS | Performed by: INTERNAL MEDICINE

## 2022-06-13 PROCEDURE — 96365 THER/PROPH/DIAG IV INF INIT: CPT

## 2022-06-13 PROCEDURE — 25000003 PHARM REV CODE 250: Performed by: INTERNAL MEDICINE

## 2022-06-13 PROCEDURE — 63600175 PHARM REV CODE 636 W HCPCS: Performed by: INTERNAL MEDICINE

## 2022-06-13 RX ORDER — HEPARIN 100 UNIT/ML
500 SYRINGE INTRAVENOUS
OUTPATIENT
Start: 2022-06-20

## 2022-06-13 RX ORDER — SODIUM CHLORIDE 0.9 % (FLUSH) 0.9 %
10 SYRINGE (ML) INJECTION
Status: DISCONTINUED | OUTPATIENT
Start: 2022-06-13 | End: 2022-06-13 | Stop reason: HOSPADM

## 2022-06-13 RX ORDER — DIPHENHYDRAMINE HCL 25 MG
25 CAPSULE ORAL
Start: 2022-06-20

## 2022-06-13 RX ORDER — ACETAMINOPHEN 325 MG/1
650 TABLET ORAL
Status: COMPLETED | OUTPATIENT
Start: 2022-06-13 | End: 2022-06-13

## 2022-06-13 RX ORDER — DIPHENHYDRAMINE HCL 25 MG
25 CAPSULE ORAL
Status: COMPLETED | OUTPATIENT
Start: 2022-06-13 | End: 2022-06-13

## 2022-06-13 RX ORDER — ACETAMINOPHEN 325 MG/1
650 TABLET ORAL
Start: 2022-06-20

## 2022-06-13 RX ORDER — SODIUM CHLORIDE 0.9 % (FLUSH) 0.9 %
10 SYRINGE (ML) INJECTION
OUTPATIENT
Start: 2022-06-20

## 2022-06-13 RX ADMIN — ACETAMINOPHEN 650 MG: 325 TABLET ORAL at 02:06

## 2022-06-13 RX ADMIN — DIPHENHYDRAMINE HYDROCHLORIDE 25 MG: 25 CAPSULE ORAL at 02:06

## 2022-06-13 RX ADMIN — IRON SUCROSE 200 MG: 20 INJECTION, SOLUTION INTRAVENOUS at 02:06

## 2022-06-13 RX ADMIN — SODIUM CHLORIDE, PRESERVATIVE FREE 10 ML: 5 INJECTION INTRAVENOUS at 03:06

## 2022-06-13 RX ADMIN — SODIUM CHLORIDE: 9 INJECTION, SOLUTION INTRAVENOUS at 02:06

## 2022-06-13 NOTE — PLAN OF CARE
Problem: Fall Injury Risk  Goal: Absence of Fall and Fall-Related Injury  Outcome: Ongoing, Progressing  Intervention: Identify and Manage Contributors  Flowsheets (Taken 6/13/2022 1350)  Self-Care Promotion: independence encouraged  Medication Review/Management: medications reviewed  Intervention: Promote Injury-Free Environment  Flowsheets (Taken 6/13/2022 1350)  Safety Promotion/Fall Prevention: medications reviewed

## 2022-06-14 ENCOUNTER — HOSPITAL ENCOUNTER (INPATIENT)
Facility: HOSPITAL | Age: 77
LOS: 5 days | Discharge: HOME | DRG: 391 | End: 2022-06-20
Attending: EMERGENCY MEDICINE | Admitting: HOSPITALIST
Payer: MEDICARE

## 2022-06-14 DIAGNOSIS — N17.9 AKI (ACUTE KIDNEY INJURY): ICD-10-CM

## 2022-06-14 DIAGNOSIS — K56.609 COLONIC OBSTRUCTION: ICD-10-CM

## 2022-06-14 DIAGNOSIS — G93.41 ENCEPHALOPATHY, METABOLIC: ICD-10-CM

## 2022-06-14 DIAGNOSIS — K59.00 CONSTIPATION, UNSPECIFIED CONSTIPATION TYPE: Primary | ICD-10-CM

## 2022-06-14 DIAGNOSIS — I48.91 A-FIB: ICD-10-CM

## 2022-06-14 DIAGNOSIS — I48.91 ATRIAL FIBRILLATION: ICD-10-CM

## 2022-06-14 DIAGNOSIS — K92.2 GI BLEED: ICD-10-CM

## 2022-06-14 DIAGNOSIS — G93.40 ENCEPHALOPATHY: ICD-10-CM

## 2022-06-14 LAB
FINAL PATHOLOGIC DIAGNOSIS: NORMAL
GROSS: NORMAL
Lab: NORMAL

## 2022-06-14 PROCEDURE — 96367 TX/PROPH/DG ADDL SEQ IV INF: CPT

## 2022-06-14 PROCEDURE — 96368 THER/DIAG CONCURRENT INF: CPT

## 2022-06-14 PROCEDURE — 86901 BLOOD TYPING SEROLOGIC RH(D): CPT | Performed by: EMERGENCY MEDICINE

## 2022-06-14 PROCEDURE — 99285 EMERGENCY DEPT VISIT HI MDM: CPT | Mod: 25

## 2022-06-14 PROCEDURE — 96365 THER/PROPH/DIAG IV INF INIT: CPT

## 2022-06-14 PROCEDURE — 80053 COMPREHEN METABOLIC PANEL: CPT | Performed by: EMERGENCY MEDICINE

## 2022-06-14 PROCEDURE — 83605 ASSAY OF LACTIC ACID: CPT | Performed by: EMERGENCY MEDICINE

## 2022-06-14 PROCEDURE — 63600175 PHARM REV CODE 636 W HCPCS: Performed by: EMERGENCY MEDICINE

## 2022-06-14 PROCEDURE — 87040 BLOOD CULTURE FOR BACTERIA: CPT | Performed by: EMERGENCY MEDICINE

## 2022-06-14 PROCEDURE — 85025 COMPLETE CBC W/AUTO DIFF WBC: CPT | Performed by: EMERGENCY MEDICINE

## 2022-06-14 PROCEDURE — 82962 GLUCOSE BLOOD TEST: CPT

## 2022-06-14 PROCEDURE — 36415 COLL VENOUS BLD VENIPUNCTURE: CPT | Performed by: EMERGENCY MEDICINE

## 2022-06-14 RX ADMIN — SODIUM CHLORIDE, SODIUM LACTATE, POTASSIUM CHLORIDE, AND CALCIUM CHLORIDE 2259 ML: .6; .31; .03; .02 INJECTION, SOLUTION INTRAVENOUS at 11:06

## 2022-06-14 NOTE — Clinical Note
Is this patient a high probability for COVID-19?: No   Diagnosis: PDERO (acute kidney injury) [975120]   Admitting Provider:: JENNIFER STEWART [[id number]]   Future Attending Provider: JENNIFER STEWART [[id number]]   Reason for IP Medical Treatment  (Clinical interventions that can only be accomplished in the IP setting? ) :: AMS/PEDRO   Estimated Length of Stay:: 2 midnights   I certify that Inpatient services for greater than or equal to 2 midnights are medically necessary:: Yes   Plans for Post-Acute care--if anticipated (pick the single best option):: A. No post acute care anticipated at this time   Special Needs:: No Special Needs [1]

## 2022-06-15 ENCOUNTER — OUTSIDE PLACE OF SERVICE (OUTPATIENT)
Dept: INFECTIOUS DISEASES | Facility: CLINIC | Age: 77
End: 2022-06-15
Payer: MEDICARE

## 2022-06-15 DIAGNOSIS — A41.9 SEPSIS: ICD-10-CM

## 2022-06-15 PROBLEM — D50.9 IRON DEFICIENCY ANEMIA: Status: ACTIVE | Noted: 2022-05-18

## 2022-06-15 PROBLEM — G93.41 ENCEPHALOPATHY, METABOLIC: Status: ACTIVE | Noted: 2022-06-15

## 2022-06-15 PROBLEM — N17.9 AKI (ACUTE KIDNEY INJURY): Status: ACTIVE | Noted: 2022-06-15

## 2022-06-15 LAB
ABO + RH BLD: NORMAL
ALBUMIN SERPL BCP-MCNC: 2.9 G/DL (ref 3.5–5.2)
ALBUMIN SERPL BCP-MCNC: 3.4 G/DL (ref 3.5–5.2)
ALLENS TEST: ABNORMAL
ALP SERPL-CCNC: 100 U/L (ref 55–135)
ALP SERPL-CCNC: 87 U/L (ref 55–135)
ALT SERPL W/O P-5'-P-CCNC: 18 U/L (ref 10–44)
ALT SERPL W/O P-5'-P-CCNC: 19 U/L (ref 10–44)
AMMONIA PLAS-SCNC: 36 UMOL/L (ref 10–50)
AMORPH CRY URNS QL MICRO: NORMAL
AMPHET+METHAMPHET UR QL: NEGATIVE
ANION GAP SERPL CALC-SCNC: 13 MMOL/L (ref 8–16)
ANION GAP SERPL CALC-SCNC: 13 MMOL/L (ref 8–16)
ANISOCYTOSIS BLD QL SMEAR: SLIGHT
AST SERPL-CCNC: 35 U/L (ref 10–40)
AST SERPL-CCNC: 37 U/L (ref 10–40)
BACTERIA #/AREA URNS HPF: NORMAL /HPF
BARBITURATES UR QL SCN>200 NG/ML: NEGATIVE
BASOPHILS # BLD AUTO: 0.02 K/UL (ref 0–0.2)
BASOPHILS # BLD AUTO: ABNORMAL K/UL (ref 0–0.2)
BASOPHILS NFR BLD: 0 % (ref 0–1.9)
BASOPHILS NFR BLD: 0.2 % (ref 0–1.9)
BENZODIAZ UR QL SCN>200 NG/ML: NEGATIVE
BILIRUB SERPL-MCNC: 0.4 MG/DL (ref 0.1–1)
BILIRUB SERPL-MCNC: 0.5 MG/DL (ref 0.1–1)
BILIRUB UR QL STRIP: NEGATIVE
BLD GP AB SCN CELLS X3 SERPL QL: NORMAL
BUN SERPL-MCNC: 28 MG/DL (ref 8–23)
BUN SERPL-MCNC: 31 MG/DL (ref 8–23)
BZE UR QL SCN: NEGATIVE
CALCIUM SERPL-MCNC: 8.6 MG/DL (ref 8.7–10.5)
CALCIUM SERPL-MCNC: 9.2 MG/DL (ref 8.7–10.5)
CANNABINOIDS UR QL SCN: NEGATIVE
CHLORIDE SERPL-SCNC: 110 MMOL/L (ref 95–110)
CHLORIDE SERPL-SCNC: 111 MMOL/L (ref 95–110)
CLARITY UR: CLEAR
CO2 SERPL-SCNC: 16 MMOL/L (ref 23–29)
CO2 SERPL-SCNC: 18 MMOL/L (ref 23–29)
COLOR UR: YELLOW
CREAT SERPL-MCNC: 1.3 MG/DL (ref 0.5–1.4)
CREAT SERPL-MCNC: 1.9 MG/DL (ref 0.5–1.4)
CREAT UR-MCNC: 19.4 MG/DL (ref 15–325)
DELSYS: ABNORMAL
DIFFERENTIAL METHOD: ABNORMAL
DIFFERENTIAL METHOD: ABNORMAL
EOSINOPHIL # BLD AUTO: 0 K/UL (ref 0–0.5)
EOSINOPHIL # BLD AUTO: ABNORMAL K/UL (ref 0–0.5)
EOSINOPHIL NFR BLD: 0 % (ref 0–8)
EOSINOPHIL NFR BLD: 0.3 % (ref 0–8)
ERYTHROCYTE [DISTWIDTH] IN BLOOD BY AUTOMATED COUNT: 15 % (ref 11.5–14.5)
ERYTHROCYTE [DISTWIDTH] IN BLOOD BY AUTOMATED COUNT: 15.2 % (ref 11.5–14.5)
EST. GFR  (AFRICAN AMERICAN): 30 ML/MIN/1.73 M^2
EST. GFR  (AFRICAN AMERICAN): 47 ML/MIN/1.73 M^2
EST. GFR  (NON AFRICAN AMERICAN): 26 ML/MIN/1.73 M^2
EST. GFR  (NON AFRICAN AMERICAN): 41 ML/MIN/1.73 M^2
GLUCOSE SERPL-MCNC: 107 MG/DL (ref 70–110)
GLUCOSE SERPL-MCNC: 126 MG/DL (ref 70–110)
GLUCOSE UR QL STRIP: NEGATIVE
HCO3 UR-SCNC: 19.9 MMOL/L (ref 24–28)
HCT VFR BLD AUTO: 35.7 % (ref 37–48.5)
HCT VFR BLD AUTO: 35.9 % (ref 37–48.5)
HCV AB SERPL QL IA: NEGATIVE
HGB BLD-MCNC: 11.2 G/DL (ref 12–16)
HGB BLD-MCNC: 11.3 G/DL (ref 12–16)
HGB UR QL STRIP: ABNORMAL
IMM GRANULOCYTES # BLD AUTO: 0.05 K/UL (ref 0–0.04)
IMM GRANULOCYTES # BLD AUTO: ABNORMAL K/UL (ref 0–0.04)
IMM GRANULOCYTES NFR BLD AUTO: 0.5 % (ref 0–0.5)
IMM GRANULOCYTES NFR BLD AUTO: ABNORMAL % (ref 0–0.5)
KETONES UR QL STRIP: NEGATIVE
LACTATE SERPL-SCNC: 1.1 MMOL/L (ref 0.5–2.2)
LACTATE SERPL-SCNC: 1.4 MMOL/L (ref 0.5–2.2)
LACTATE SERPL-SCNC: 2.9 MMOL/L (ref 0.5–2.2)
LACTATE SERPL-SCNC: 3.6 MMOL/L (ref 0.5–2.2)
LACTATE SERPL-SCNC: 4 MMOL/L (ref 0.5–2.2)
LEUKOCYTE ESTERASE UR QL STRIP: NEGATIVE
LYMPHOCYTES # BLD AUTO: 0.5 K/UL (ref 1–4.8)
LYMPHOCYTES # BLD AUTO: ABNORMAL K/UL (ref 1–4.8)
LYMPHOCYTES NFR BLD: 4.4 % (ref 18–48)
LYMPHOCYTES NFR BLD: 8 % (ref 18–48)
MAGNESIUM SERPL-MCNC: 2.1 MG/DL (ref 1.6–2.6)
MCH RBC QN AUTO: 29.6 PG (ref 27–31)
MCH RBC QN AUTO: 29.7 PG (ref 27–31)
MCHC RBC AUTO-ENTMCNC: 31.2 G/DL (ref 32–36)
MCHC RBC AUTO-ENTMCNC: 31.7 G/DL (ref 32–36)
MCV RBC AUTO: 94 FL (ref 82–98)
MCV RBC AUTO: 95 FL (ref 82–98)
METHADONE UR QL SCN>300 NG/ML: NEGATIVE
MICROSCOPIC COMMENT: NORMAL
MONOCYTES # BLD AUTO: 0.7 K/UL (ref 0.3–1)
MONOCYTES # BLD AUTO: ABNORMAL K/UL (ref 0.3–1)
MONOCYTES NFR BLD: 1 % (ref 4–15)
MONOCYTES NFR BLD: 7 % (ref 4–15)
NEUTROPHILS # BLD AUTO: 9.2 K/UL (ref 1.8–7.7)
NEUTROPHILS NFR BLD: 84 % (ref 38–73)
NEUTROPHILS NFR BLD: 87.6 % (ref 38–73)
NEUTS BAND NFR BLD MANUAL: 7 %
NITRITE UR QL STRIP: NEGATIVE
NRBC BLD-RTO: 0 /100 WBC
NRBC BLD-RTO: 0 /100 WBC
OPIATES UR QL SCN: ABNORMAL
OVALOCYTES BLD QL SMEAR: ABNORMAL
PCO2 BLDA: 38.8 MMHG (ref 35–45)
PCP UR QL SCN>25 NG/ML: NEGATIVE
PH SMN: 7.32 [PH] (ref 7.35–7.45)
PH UR STRIP: 6 [PH] (ref 5–8)
PHOSPHATE SERPL-MCNC: 4.4 MG/DL (ref 2.7–4.5)
PLATELET # BLD AUTO: 197 K/UL (ref 150–450)
PLATELET # BLD AUTO: 238 K/UL (ref 150–450)
PLATELET BLD QL SMEAR: ABNORMAL
PMV BLD AUTO: 10 FL (ref 9.2–12.9)
PMV BLD AUTO: 10.1 FL (ref 9.2–12.9)
PO2 BLDA: 65 MMHG (ref 80–100)
POC BE: -6 MMOL/L
POC SATURATED O2: 91 % (ref 95–100)
POC TCO2: 21 MMOL/L (ref 23–27)
POCT GLUCOSE: 103 MG/DL (ref 70–110)
POCT GLUCOSE: 81 MG/DL (ref 70–110)
POIKILOCYTOSIS BLD QL SMEAR: SLIGHT
POTASSIUM SERPL-SCNC: 4.7 MMOL/L (ref 3.5–5.1)
POTASSIUM SERPL-SCNC: 4.9 MMOL/L (ref 3.5–5.1)
PROCALCITONIN SERPL IA-MCNC: 8.98 NG/ML
PROT SERPL-MCNC: 5.5 G/DL (ref 6–8.4)
PROT SERPL-MCNC: 6.1 G/DL (ref 6–8.4)
PROT UR QL STRIP: NEGATIVE
RBC # BLD AUTO: 3.78 M/UL (ref 4–5.4)
RBC # BLD AUTO: 3.8 M/UL (ref 4–5.4)
RBC #/AREA URNS HPF: 3 /HPF (ref 0–4)
SAMPLE: ABNORMAL
SARS-COV-2 RDRP RESP QL NAA+PROBE: NEGATIVE
SITE: ABNORMAL
SODIUM SERPL-SCNC: 139 MMOL/L (ref 136–145)
SODIUM SERPL-SCNC: 142 MMOL/L (ref 136–145)
SP GR UR STRIP: 1.02 (ref 1–1.03)
SQUAMOUS #/AREA URNS HPF: 1 /HPF
TOXICOLOGY INFORMATION: ABNORMAL
URATE CRY URNS QL MICRO: NORMAL
URN SPEC COLLECT METH UR: ABNORMAL
UROBILINOGEN UR STRIP-ACNC: NEGATIVE EU/DL
VANCOMYCIN SERPL-MCNC: 11.5 UG/ML
WBC # BLD AUTO: 10.5 K/UL (ref 3.9–12.7)
WBC # BLD AUTO: 5.98 K/UL (ref 3.9–12.7)
WBC #/AREA URNS HPF: 1 /HPF (ref 0–5)

## 2022-06-15 PROCEDURE — 99900035 HC TECH TIME PER 15 MIN (STAT)

## 2022-06-15 PROCEDURE — 83605 ASSAY OF LACTIC ACID: CPT | Mod: 91 | Performed by: NURSE PRACTITIONER

## 2022-06-15 PROCEDURE — 63600175 PHARM REV CODE 636 W HCPCS: Performed by: EMERGENCY MEDICINE

## 2022-06-15 PROCEDURE — 99233 PR SUBSEQUENT HOSPITAL CARE,LEVL III: ICD-10-PCS | Mod: S$GLB,,, | Performed by: STUDENT IN AN ORGANIZED HEALTH CARE EDUCATION/TRAINING PROGRAM

## 2022-06-15 PROCEDURE — 83605 ASSAY OF LACTIC ACID: CPT | Mod: 91 | Performed by: EMERGENCY MEDICINE

## 2022-06-15 PROCEDURE — 63600175 PHARM REV CODE 636 W HCPCS: Performed by: NURSE PRACTITIONER

## 2022-06-15 PROCEDURE — U0002 COVID-19 LAB TEST NON-CDC: HCPCS | Performed by: EMERGENCY MEDICINE

## 2022-06-15 PROCEDURE — 99222 PR INITIAL HOSPITAL CARE,LEVL II: ICD-10-PCS | Mod: ,,, | Performed by: INTERNAL MEDICINE

## 2022-06-15 PROCEDURE — 84100 ASSAY OF PHOSPHORUS: CPT | Performed by: NURSE PRACTITIONER

## 2022-06-15 PROCEDURE — 36415 COLL VENOUS BLD VENIPUNCTURE: CPT | Performed by: EMERGENCY MEDICINE

## 2022-06-15 PROCEDURE — 25000003 PHARM REV CODE 250: Performed by: HOSPITALIST

## 2022-06-15 PROCEDURE — 81000 URINALYSIS NONAUTO W/SCOPE: CPT | Performed by: EMERGENCY MEDICINE

## 2022-06-15 PROCEDURE — 93005 ELECTROCARDIOGRAM TRACING: CPT

## 2022-06-15 PROCEDURE — 82140 ASSAY OF AMMONIA: CPT | Performed by: NURSE PRACTITIONER

## 2022-06-15 PROCEDURE — 80307 DRUG TEST PRSMV CHEM ANLYZR: CPT | Performed by: NURSE PRACTITIONER

## 2022-06-15 PROCEDURE — 99223 PR INITIAL HOSPITAL CARE,LEVL III: ICD-10-PCS | Mod: ,,, | Performed by: STUDENT IN AN ORGANIZED HEALTH CARE EDUCATION/TRAINING PROGRAM

## 2022-06-15 PROCEDURE — 27000221 HC OXYGEN, UP TO 24 HOURS

## 2022-06-15 PROCEDURE — 94761 N-INVAS EAR/PLS OXIMETRY MLT: CPT

## 2022-06-15 PROCEDURE — 99233 SBSQ HOSP IP/OBS HIGH 50: CPT | Mod: S$GLB,,, | Performed by: STUDENT IN AN ORGANIZED HEALTH CARE EDUCATION/TRAINING PROGRAM

## 2022-06-15 PROCEDURE — 85007 BL SMEAR W/DIFF WBC COUNT: CPT | Performed by: NURSE PRACTITIONER

## 2022-06-15 PROCEDURE — 83735 ASSAY OF MAGNESIUM: CPT | Performed by: NURSE PRACTITIONER

## 2022-06-15 PROCEDURE — 25000003 PHARM REV CODE 250: Performed by: NURSE PRACTITIONER

## 2022-06-15 PROCEDURE — 99222 1ST HOSP IP/OBS MODERATE 55: CPT | Mod: ,,, | Performed by: INTERNAL MEDICINE

## 2022-06-15 PROCEDURE — 36415 COLL VENOUS BLD VENIPUNCTURE: CPT | Performed by: NURSE PRACTITIONER

## 2022-06-15 PROCEDURE — 80202 ASSAY OF VANCOMYCIN: CPT | Performed by: EMERGENCY MEDICINE

## 2022-06-15 PROCEDURE — 86803 HEPATITIS C AB TEST: CPT | Performed by: EMERGENCY MEDICINE

## 2022-06-15 PROCEDURE — 25500020 PHARM REV CODE 255: Performed by: HOSPITALIST

## 2022-06-15 PROCEDURE — 93010 ELECTROCARDIOGRAM REPORT: CPT | Mod: ,,, | Performed by: SPECIALIST

## 2022-06-15 PROCEDURE — 80053 COMPREHEN METABOLIC PANEL: CPT | Performed by: NURSE PRACTITIONER

## 2022-06-15 PROCEDURE — 63600175 PHARM REV CODE 636 W HCPCS: Performed by: STUDENT IN AN ORGANIZED HEALTH CARE EDUCATION/TRAINING PROGRAM

## 2022-06-15 PROCEDURE — 83605 ASSAY OF LACTIC ACID: CPT | Performed by: EMERGENCY MEDICINE

## 2022-06-15 PROCEDURE — 25000003 PHARM REV CODE 250: Performed by: EMERGENCY MEDICINE

## 2022-06-15 PROCEDURE — 51702 INSERT TEMP BLADDER CATH: CPT

## 2022-06-15 PROCEDURE — 82803 BLOOD GASES ANY COMBINATION: CPT

## 2022-06-15 PROCEDURE — 36600 WITHDRAWAL OF ARTERIAL BLOOD: CPT

## 2022-06-15 PROCEDURE — 99223 1ST HOSP IP/OBS HIGH 75: CPT | Mod: ,,, | Performed by: STUDENT IN AN ORGANIZED HEALTH CARE EDUCATION/TRAINING PROGRAM

## 2022-06-15 PROCEDURE — 85027 COMPLETE CBC AUTOMATED: CPT | Performed by: NURSE PRACTITIONER

## 2022-06-15 PROCEDURE — 84145 PROCALCITONIN (PCT): CPT | Performed by: NURSE PRACTITIONER

## 2022-06-15 PROCEDURE — 20000000 HC ICU ROOM

## 2022-06-15 PROCEDURE — 63600175 PHARM REV CODE 636 W HCPCS

## 2022-06-15 PROCEDURE — 93010 EKG 12-LEAD: ICD-10-PCS | Mod: ,,, | Performed by: SPECIALIST

## 2022-06-15 RX ORDER — NALOXONE HCL 0.4 MG/ML
VIAL (ML) INJECTION
Status: COMPLETED
Start: 2022-06-15 | End: 2022-06-15

## 2022-06-15 RX ORDER — SODIUM CHLORIDE 0.9 % (FLUSH) 0.9 %
10 SYRINGE (ML) INJECTION EVERY 8 HOURS PRN
Status: DISCONTINUED | OUTPATIENT
Start: 2022-06-15 | End: 2022-06-20 | Stop reason: HOSPADM

## 2022-06-15 RX ORDER — LANOLIN ALCOHOL/MO/W.PET/CERES
800 CREAM (GRAM) TOPICAL
Status: DISCONTINUED | OUTPATIENT
Start: 2022-06-15 | End: 2022-06-20 | Stop reason: HOSPADM

## 2022-06-15 RX ORDER — FLUCONAZOLE 2 MG/ML
400 INJECTION, SOLUTION INTRAVENOUS ONCE
Status: COMPLETED | OUTPATIENT
Start: 2022-06-15 | End: 2022-06-15

## 2022-06-15 RX ORDER — SODIUM,POTASSIUM PHOSPHATES 280-250MG
2 POWDER IN PACKET (EA) ORAL
Status: DISCONTINUED | OUTPATIENT
Start: 2022-06-15 | End: 2022-06-17

## 2022-06-15 RX ORDER — DEXTROMETHORPHAN POLISTIREX 30 MG/5 ML
1 SUSPENSION, EXTENDED RELEASE 12 HR ORAL ONCE
Status: COMPLETED | OUTPATIENT
Start: 2022-06-15 | End: 2022-06-15

## 2022-06-15 RX ORDER — SODIUM CHLORIDE 9 MG/ML
INJECTION, SOLUTION INTRAVENOUS ONCE
Status: COMPLETED | OUTPATIENT
Start: 2022-06-15 | End: 2022-06-15

## 2022-06-15 RX ORDER — POLYETHYLENE GLYCOL 3350 17 G/17G
17 POWDER, FOR SOLUTION ORAL 2 TIMES DAILY
Status: DISCONTINUED | OUTPATIENT
Start: 2022-06-15 | End: 2022-06-20 | Stop reason: HOSPADM

## 2022-06-15 RX ORDER — NALOXONE HCL 0.4 MG/ML
1 VIAL (ML) INJECTION
Status: DISCONTINUED | OUTPATIENT
Start: 2022-06-15 | End: 2022-06-20 | Stop reason: HOSPADM

## 2022-06-15 RX ORDER — ONDANSETRON 2 MG/ML
4 INJECTION INTRAMUSCULAR; INTRAVENOUS EVERY 8 HOURS PRN
Status: DISCONTINUED | OUTPATIENT
Start: 2022-06-15 | End: 2022-06-20 | Stop reason: HOSPADM

## 2022-06-15 RX ORDER — IPRATROPIUM BROMIDE AND ALBUTEROL SULFATE 2.5; .5 MG/3ML; MG/3ML
3 SOLUTION RESPIRATORY (INHALATION) EVERY 4 HOURS PRN
Status: DISCONTINUED | OUTPATIENT
Start: 2022-06-15 | End: 2022-06-20 | Stop reason: HOSPADM

## 2022-06-15 RX ORDER — SODIUM,POTASSIUM PHOSPHATES 280-250MG
2 POWDER IN PACKET (EA) ORAL
Status: DISCONTINUED | OUTPATIENT
Start: 2022-06-15 | End: 2022-06-20 | Stop reason: HOSPADM

## 2022-06-15 RX ORDER — FLUCONAZOLE 2 MG/ML
200 INJECTION, SOLUTION INTRAVENOUS
Status: DISCONTINUED | OUTPATIENT
Start: 2022-06-16 | End: 2022-06-17

## 2022-06-15 RX ORDER — IBUPROFEN 200 MG
24 TABLET ORAL
Status: DISCONTINUED | OUTPATIENT
Start: 2022-06-15 | End: 2022-06-20 | Stop reason: HOSPADM

## 2022-06-15 RX ORDER — SIMETHICONE 80 MG
1 TABLET,CHEWABLE ORAL 4 TIMES DAILY PRN
Status: DISCONTINUED | OUTPATIENT
Start: 2022-06-15 | End: 2022-06-20 | Stop reason: HOSPADM

## 2022-06-15 RX ORDER — GLUCAGON 1 MG
1 KIT INJECTION
Status: DISCONTINUED | OUTPATIENT
Start: 2022-06-15 | End: 2022-06-20 | Stop reason: HOSPADM

## 2022-06-15 RX ORDER — ACETAMINOPHEN 325 MG/1
650 TABLET ORAL EVERY 6 HOURS PRN
Status: DISCONTINUED | OUTPATIENT
Start: 2022-06-15 | End: 2022-06-20 | Stop reason: HOSPADM

## 2022-06-15 RX ORDER — MAG HYDROX/ALUMINUM HYD/SIMETH 200-200-20
30 SUSPENSION, ORAL (FINAL DOSE FORM) ORAL 4 TIMES DAILY PRN
Status: DISCONTINUED | OUTPATIENT
Start: 2022-06-15 | End: 2022-06-20 | Stop reason: HOSPADM

## 2022-06-15 RX ORDER — OXYCODONE AND ACETAMINOPHEN 10; 325 MG/1; MG/1
1 TABLET ORAL EVERY 6 HOURS PRN
Status: DISCONTINUED | OUTPATIENT
Start: 2022-06-15 | End: 2022-06-20 | Stop reason: HOSPADM

## 2022-06-15 RX ORDER — IBUPROFEN 200 MG
16 TABLET ORAL
Status: DISCONTINUED | OUTPATIENT
Start: 2022-06-15 | End: 2022-06-20 | Stop reason: HOSPADM

## 2022-06-15 RX ORDER — NALOXONE HCL 0.4 MG/ML
0.02 VIAL (ML) INJECTION
Status: DISCONTINUED | OUTPATIENT
Start: 2022-06-15 | End: 2022-06-15

## 2022-06-15 RX ORDER — ACETAMINOPHEN 325 MG/1
650 TABLET ORAL EVERY 4 HOURS PRN
Status: DISCONTINUED | OUTPATIENT
Start: 2022-06-15 | End: 2022-06-20 | Stop reason: HOSPADM

## 2022-06-15 RX ADMIN — SODIUM CHLORIDE: 0.9 INJECTION, SOLUTION INTRAVENOUS at 05:06

## 2022-06-15 RX ADMIN — PIPERACILLIN SODIUM AND TAZOBACTAM SODIUM 4.5 G: 4; .5 INJECTION, POWDER, LYOPHILIZED, FOR SOLUTION INTRAVENOUS at 05:06

## 2022-06-15 RX ADMIN — NALOXONE HYDROCHLORIDE 0.4 MG: 0.4 INJECTION, SOLUTION INTRAMUSCULAR; INTRAVENOUS; SUBCUTANEOUS at 03:06

## 2022-06-15 RX ADMIN — OXYCODONE AND ACETAMINOPHEN 1 TABLET: 10; 325 TABLET ORAL at 05:06

## 2022-06-15 RX ADMIN — VANCOMYCIN HYDROCHLORIDE 2000 MG: 1 INJECTION, POWDER, LYOPHILIZED, FOR SOLUTION INTRAVENOUS at 12:06

## 2022-06-15 RX ADMIN — POLYETHYLENE GLYCOL 3350 17 G: 17 POWDER, FOR SOLUTION ORAL at 10:06

## 2022-06-15 RX ADMIN — MINERAL OIL 1 ENEMA: 100 ENEMA RECTAL at 10:06

## 2022-06-15 RX ADMIN — PIPERACILLIN SODIUM AND TAZOBACTAM SODIUM 4.5 G: 4; .5 INJECTION, POWDER, LYOPHILIZED, FOR SOLUTION INTRAVENOUS at 01:06

## 2022-06-15 RX ADMIN — IOHEXOL 75 ML: 350 INJECTION, SOLUTION INTRAVENOUS at 09:06

## 2022-06-15 RX ADMIN — PIPERACILLIN SODIUM AND TAZOBACTAM SODIUM 4.5 G: 4; .5 INJECTION, POWDER, LYOPHILIZED, FOR SOLUTION INTRAVENOUS at 08:06

## 2022-06-15 RX ADMIN — SODIUM CHLORIDE, SODIUM LACTATE, POTASSIUM CHLORIDE, AND CALCIUM CHLORIDE 1000 ML: .6; .31; .03; .02 INJECTION, SOLUTION INTRAVENOUS at 09:06

## 2022-06-15 RX ADMIN — VANCOMYCIN HYDROCHLORIDE 1500 MG: 1.5 INJECTION, POWDER, LYOPHILIZED, FOR SOLUTION INTRAVENOUS at 08:06

## 2022-06-15 RX ADMIN — FLUCONAZOLE 400 MG: 2 INJECTION, SOLUTION INTRAVENOUS at 08:06

## 2022-06-15 RX ADMIN — POLYETHYLENE GLYCOL 3350 17 G: 17 POWDER, FOR SOLUTION ORAL at 08:06

## 2022-06-15 NOTE — ASSESSMENT & PLAN NOTE
Chronic  Chronic, controlled.  Latest blood pressure and vitals reviewed-   Temp:  [97.5 °F (36.4 °C)]   Pulse:  [83-91]   Resp:  [14]   BP: ()/(50-74)   SpO2:  [90 %-100 %] .   Home meds for hypertension were reviewed and noted below.   Hypertension Medications             amlodipine-benazepril 5-20 mg (LOTREL) 5-20 mg per capsule Take 1 capsule by mouth once daily.    furosemide (LASIX) 40 MG tablet Take 40 mg by mouth daily as needed.          While in the hospital, will manage blood pressure as follows; Continue home antihypertensive regimen    Will utilize p.r.n. blood pressure medication only if patient's blood pressure greater than  180/110 and she develops symptoms such as worsening chest pain or shortness of breath.

## 2022-06-15 NOTE — CONSULTS
Consult Note  Infectious Disease    Reason for Consult:  Sepsis, rule out UTI    HPI: Froilan Ray is a 76 y.o. female with past medical history of HTN, diverticulosis, irritable bowel syndrome, chronic anemia due to iron and B12 deficiency, epilepsy, chronic back pain and PUD who recently had an endoscopy outpatient 6/10 for her PUD, biopsy showed gastric antrum positive for H pylori.  She comes to the ER for severe abdominal pain, nausea, vomit, and altered mental status.  As per records, she denied fever, chills, cough, shortness of breath, dysuria or increased urinary frequency.    Patient seen and examined in the ER, acutely ill-appearing, pale, somnolent, in able to answer questions although seems confused  Hypotensive, tachycardic, afebrile, acute abdomen  Labs with white count of 10.5, PMN 87.6%, bands of 7%, H&H 11.3/35.7, platelet count 238  PEDRO, creatinine 1.9, normal LFTs  Lactic acid 4  UA negative  Chest x-ray negative  CT head with no acute abnormalities  CT abdomen pelvis without contrast with dilated bowel, no evidence of free air.  Constipation    Discussed with ER attending, plan to get surgery stat, repeat CT abdomen and pelvis with IV contrast.    Patient admitted for sepsis likely secondary to acute abdomen/SBO, rule out diverticulitis in the setting of constipation.    ID consult for sepsis, rule out UTI    Review of patient's allergies indicates:  No Known Allergies  Past Medical History:   Diagnosis Date    Anemia     Arthritis     Bleeding ulcer 07/2016    Chronic pain     DDD (degenerative disc disease), cervical     DDD (degenerative disc disease), lumbar     Depression     Disc degeneration, lumbosacral     Diverticulitis     Encounter for blood transfusion     Hypertension     IBS (irritable bowel syndrome)     Neuropathy of both feet     Seizures     none  since 2017    Umbilical hernia 2020     Past Surgical History:   Procedure Laterality Date    BACK SURGERY       BREAST SURGERY Right     lumpectomy    CAUDAL EPIDURAL STEROID INJECTION N/A 1/28/2022    Procedure: Injection-steroid-epidural-caudal;  Surgeon: Luzmaria Marcelino MD;  [facility] OR;  Service: Pain Management;  Laterality: N/A;    COLONOSCOPY N/A 1/14/2022    Procedure: COLONOSCOPY;  Surgeon: Abby Landers MD;  Location: [facility] ENDO;  Service: Endoscopy;  Laterality: N/A;    ENDOSCOPIC ULTRASOUND OF UPPER GASTROINTESTINAL TRACT N/A 7/21/2020    Procedure: ULTRASOUND, UPPER GI TRACT, ENDOSCOPIC;  Surgeon: Marcio Nguyễn III, MD;  Location: [facility] ENDO;  Service: Endoscopy;  Laterality: N/A;    ESOPHAGOGASTRODUODENOSCOPY N/A 1/14/2022    Procedure: EGD (ESOPHAGOGASTRODUODENOSCOPY);  Surgeon: Abby Landers MD;  Location: [facility] ENDO;  Service: Endoscopy;  Laterality: N/A;    ESOPHAGOGASTRODUODENOSCOPY N/A 6/10/2022    Procedure: EGD (ESOPHAGOGASTRODUODENOSCOPY);  Surgeon: Abby Landers MD;  Location: [facility];  Service: Endoscopy;  Laterality: N/A;    EYE SURGERY      cataract    HYSTERECTOMY      INTRAMEDULLARY RODDING OF TROCHANTER OF FEMUR Left 12/11/2018    Procedure: INSERTION, INTRAMEDULLARY SANTOS, FEMUR, TROCHANTER;  Surgeon: Eulalio De La Cruz MD;  Location: [facility];  Service: Orthopedics;  Laterality: Left;    SPINAL CORD STIMULATOR IMPLANT  09/18/2013    and removal    SPINE SURGERY  2006    lumbar L2-S1 decompression.    SPINE SURGERY      cervical decompression    TONSILLECTOMY       Social History     Tobacco Use    Smoking status: Never Smoker    Smokeless tobacco: Never Used   Substance Use Topics    Alcohol use: No        Family History   Problem Relation Age of Onset    Diabetes Mother     Hypertension Mother     Irritable bowel syndrome Mother     Diabetes Father         insulin dependent    Hypertension Father     Heart disease Father     Coronary artery disease Father     Depression Father     Diabetes Sister     Diabetes Brother     COPD Brother         Pertinent medications noted:     Review of Systems:   Patient is somnolent, minimally responsive, complaining of severe acute abdominal pain. 10 point ROS reviewed negative except for what mentioned in HPI    EXAM & DIAGNOSTICS REVIEWED:   Vitals:     Temp:  [97.5 °F (36.4 °C)-98.6 °F (37 °C)]   Temp: 98.6 °F (37 °C) (06/15/22 0615)  Pulse: 100 (06/15/22 0630)  Resp: 15 (06/15/22 0600)  BP: (!) 109/53 (06/15/22 0630)  SpO2: 98 % (06/15/22 0630)    Intake/Output Summary (Last 24 hours) at 6/15/2022 0827  Last data filed at 6/15/2022 0600  Gross per 24 hour   Intake 2903.77 ml   Output 1350 ml   Net 1553.77 ml       General:  Acute ill-appearing, lethargic, in severe pain  Eyes:  Anicteric, PERRL  ENT:  Very dry oral mucosa, no thrush  Neck:  Supple, no adenopathy appreciated  Lungs: Clear to auscultation b/l  Heart:  Tachycardic S1/S2+, regular rhythm, no murmurs  Abd:  Distended, hypoactive bowel sounds, reducible umbilical hernia, tenderness to diffuse palpation,+ rebound   :  Boyd, yellow urine  Musc:  Joints without effusion, swelling,  erythema, synovitis  Skin:  Pale, warm, no rash  Wound:   Neuro: Lethargic, minimally responsive  Psych:  Lethargic   Lymphatic:     No cervical, supraclavicular nodes  Extrem: No LE edema b/l  VAD:  R IJ     Peripheral IV       Isolation: None      General Labs reviewed:  Recent Labs   Lab 06/14/22  2344 06/15/22  0319   WBC 10.50 5.98   HGB 11.3* 11.2*   HCT 35.7* 35.9*    197       Recent Labs   Lab 06/14/22 2345 06/15/22  0319    139   K 4.9 4.7   * 110   CO2 18* 16*   BUN 31* 28*   CREATININE 1.9* 1.3   CALCIUM 9.2 8.6*   PROT 6.1 5.5*   BILITOT 0.4 0.5   ALKPHOS 100 87   ALT 18 19   AST 35 37     No results for input(s): CRP in the last 168 hours.  No results for input(s): SEDRATE in the last 168 hours.    Estimated Creatinine Clearance: 39.4 mL/min (based on SCr of 1.3 mg/dL).     Micro:  Microbiology Results (last 7 days)     Procedure  Component Value Units Date/Time    Blood culture x two cultures. Draw prior to antibiotics. [[id number]] Collected: 06/14/22 2357    Order Status: Sent Specimen: Blood Updated: 06/14/22 2358    Blood culture x two cultures. Draw prior to antibiotics. [[id number]] Collected: 06/14/22 2358    Order Status: Sent Specimen: Blood from Peripheral, Right Updated: 06/14/22 2358        Pathology:   Diagnosis 1. Gastric ulcer, biopsy:       -  Ulcerated gastric antral mucosa in a background of marked reactive   gastropathy       -  Immunohistochemical stain with appropriate control is negative for   Helicobacter pylori   Comment:  The specimen shows foveolar hyperplasia with associated lamina   propria edema and vascular ectasia, consistent with reactive gastropathy.   This pattern of injury is often seen in the setting of bile reflux, alcohol   use, stress ulceration and NSAID/aspirin use. There is no evidence of atrophy   or intestinal metaplasia.  The specimen is negative for dysplasia or   malignancy.   2. Gastric antrum and body, biopsy:       -  Gastric antral and oxyntic mucosa with findings of reactive   gastropathy       -  Immunohistochemical stain with appropriate control is  POSITIVE  for   Helicobacter pylori   Comment:  The specimen shows foveolar hyperplasia with associated lamina   propria edema and vascular ectasia, consistent with reactive gastropathy.   This pattern of injury is often seen in the setting of bile reflux, alcohol   use, stress ulceration and NSAID/aspirin use. There is no evidence of atrophy   or intestinal metaplasia.  The specimen is negative for dysplasia or   malignancy.      Imaging Reviewed:  CXR  CT abdomen/pelvis w/o contrast: The colon is full of stool and dilated down to the descending colon.  There is also stool noted in the rectum.  This may represent constipation.  Free fluid or free air is not seen.  Constipation.  Prior hysterectomy.  3 mm left intrarenal stone.  Prior  granulomatous disease.  Moderate size hiatal hernia. Lumbar scoliosis.  Degenerative disc disease at every level of the lumbar spine.  Prior ORIF left hip.      Cardiology:       IMPRESSION & PLAN     1. Hypovolemic and septic shock in the setting of acute abdomen secondary to bowel obstruction/diverticulitis/constipation   Lactic acid 4   Procalcitonin 8.98   Blood culturese x 2 in process   UA negative    2. Deion 1.9->1.3, resolved  3. PMHx:  HTN, diverticulosis, irritable bowel syndrome, chronic anemia due to iron and B12 deficiency, epilepsy, chronic back pain and PUD - last gastric biopsy +H pylor      Recommendations:  Follow up repeat CT scan w/ IV contrast  Surgery STAT for emergent ex-lap   Please send peritoneal fluid for cell count, Gram stain and cultures  Continue vancomycin IV, keep level 15-20  Zosyn 4.5 g IV q.8  Start fluconazole 400 mg IV loading dose, followed by 200 mg IV daily  Follow cultures   Aspiration precautions     Will follow     D/w Dr. Hay, Dr Vargas     Medical Decision Making during this encounter was  [_] Low Complexity  [_] Moderate Complexity  [xx] High Complexity

## 2022-06-15 NOTE — PLAN OF CARE
[facility] Emergency Dept  Initial Discharge Assessment       Primary Care Provider: Primary Doctor No    Admission Diagnosis: PEDRO (acute kidney injury) [N17.9]    Admission Date: 6/14/2022  Expected Discharge Date:     Discharge Barriers Identified: None    Payor: [employer]ET[employer] MANAGED MEDICARE / Plan: [employer] MEDICARE PLAN PPO / Product Type: Medicare Advantage /     Extended Emergency Contact Information  Primary Emergency Contact: Otoniel Ray  Address: [address] [place] [address] [id number] [place] [address]  Home Phone: [phone]  Mobile Phone: [phone]  Relation: Spouse  Secondary Emergency Contact: SHAYLASAHRA  Mobile Phone: [phone]  Relation: Son    Discharge Plan A: Home with family  Discharge Plan B: Home      [id number] - [place], [place] - [address] [address]  [address]  [place] [address]  Phone: [phone] Fax: [phone]    CM completed DC assessment with patient at bedside.   CM verified all information on facesheet as correct.   Pt lives at listed address With her spouse.    CM verified emergency contacts listed on facesheet.    Verified pt does not have a PCP and Current pharmacy as [facility].    Denies HH or HD. DME in the home - walker and cane.   Reports living in a large Mobile home, being independent with all ADL's and drives  Reports that her husabnd will provide transportation home upon DC.   Verified insurance on facesheet as correct.  DC plan is to return home, no needs voiced at this time.   CM will follow and update DC needs as needed.   All appts will be scheduled prior to DC.      Initial Assessment (most recent)     Adult Discharge Assessment - 06/15/22 1116        Discharge Assessment    Assessment Type Discharge Planning Assessment     Confirmed/corrected address, phone number and insurance Yes     Confirmed Demographics Correct on Facesheet     Source of Information patient     Communicated BAILEY with  patient/caregiver Date not available/Unable to determine     Lives With spouse;child(shavon), adult     Do you expect to return to your current living situation? Yes     Do you have help at home or someone to help you manage your care at home? Yes     Who are your caregiver(s) and their phone number(s)?      Prior to hospitilization cognitive status: Alert/Oriented;No Deficits     Current cognitive status: Alert/Oriented   takes pt a long time to respond and formulate correct answer    Walking or Climbing Stairs Difficulty ambulation difficulty, requires equipment     Mobility Management uses walker and cane for stability     Dressing/Bathing Difficulty none     Equipment Currently Used at Home cane, straight;walker, rolling     Readmission within 30 days? No     Patient currently being followed by outpatient case management? No     Do you currently have service(s) that help you manage your care at home? No     Do you take prescription medications? Yes     Do you have prescription coverage? Yes     Do you have any problems affording any of your prescribed medications? TBD     Is the patient taking medications as prescribed? yes     Who is going to help you get home at discharge?      How do you get to doctors appointments? car, drives self;family or friend will provide     Are you on dialysis? No     Do you take coumadin? No     Discharge Plan A Home with family     Discharge Plan B Home     DME Needed Upon Discharge  none     Discharge Plan discussed with: Patient     Discharge Barriers Identified None        Relationship/Environment    Name(s) of Who Lives With Patient - Otoniel and son- Aristeo

## 2022-06-15 NOTE — SUBJECTIVE & OBJECTIVE
Past Medical History:   Diagnosis Date    Anemia     Arthritis     Bleeding ulcer 07/2016    Chronic pain     DDD (degenerative disc disease), cervical     DDD (degenerative disc disease), lumbar     Depression     Disc degeneration, lumbosacral     Diverticulitis     Encounter for blood transfusion     Hypertension     IBS (irritable bowel syndrome)     Neuropathy of both feet     Seizures     none  since 2017    Umbilical hernia 2020       Past Surgical History:   Procedure Laterality Date    BACK SURGERY      BREAST SURGERY Right     lumpectomy    CAUDAL EPIDURAL STEROID INJECTION N/A 1/28/2022    Procedure: Injection-steroid-epidural-caudal;  Surgeon: Luzmaria Marcelino MD;  Location: [facility] OR;  Service: Pain Management;  Laterality: N/A;    COLONOSCOPY N/A 1/14/2022    Procedure: COLONOSCOPY;  Surgeon: Abby Landers MD;  Location: [facility] ENDO;  Service: Endoscopy;  Laterality: N/A;    ENDOSCOPIC ULTRASOUND OF UPPER GASTROINTESTINAL TRACT N/A 7/21/2020    Procedure: ULTRASOUND, UPPER GI TRACT, ENDOSCOPIC;  Surgeon: Marcio Nguyễn III, MD;  Location: [facility];  Service: Endoscopy;  Laterality: N/A;    ESOPHAGOGASTRODUODENOSCOPY N/A 1/14/2022    Procedure: EGD (ESOPHAGOGASTRODUODENOSCOPY);  Surgeon: Abby Landers MD;  Location: [facility];  Service: Endoscopy;  Laterality: N/A;    ESOPHAGOGASTRODUODENOSCOPY N/A 6/10/2022    Procedure: EGD (ESOPHAGOGASTRODUODENOSCOPY);  Surgeon: Abby Landers MD;  Location: [facility];  Service: Endoscopy;  Laterality: N/A;    EYE SURGERY      cataract    HYSTERECTOMY      INTRAMEDULLARY RODDING OF TROCHANTER OF FEMUR Left 12/11/2018    Procedure: INSERTION, INTRAMEDULLARY SANTOS, FEMUR, TROCHANTER;  Surgeon: Eulalio De La Cruz MD;  Location: [facility];  Service: Orthopedics;  Laterality: Left;    SPINAL CORD STIMULATOR IMPLANT  09/18/2013    and removal    SPINE SURGERY  2006    lumbar L2-S1 decompression.    SPINE SURGERY      cervical decompression    TONSILLECTOMY          Review of patient's allergies indicates:  No Known Allergies    No current facility-administered medications on file prior to encounter.     Current Outpatient Medications on File Prior to Encounter   Medication Sig    acetaminophen (TYLENOL) 325 MG tablet Take 650 mg by mouth once.    amlodipine-benazepril 5-20 mg (LOTREL) 5-20 mg per capsule Take 1 capsule by mouth once daily.    ascorbic acid, vitamin C, (VITAMIN C) 250 MG tablet Take 1 tablet (250 mg total) by mouth once daily.    cyanocobalamin (VITAMIN B-12) 100 MCG tablet Take 1 tablet (100 mcg total) by mouth once daily.    cyclobenzaprine (FLEXERIL) 10 MG tablet TAKE 1 TABLET(10 MG) BY MOUTH THREE TIMES DAILY AS NEEDED FOR MUSCLE SPASMS    EScitalopram oxalate (LEXAPRO) 20 MG tablet TAKE 1 TABLET(20 MG) BY MOUTH EVERY DAY    ferrous sulfate (FEOSOL) Tab tablet Take 1 tablet (1 each total) by mouth daily with breakfast. (Patient not taking: Reported on 6/10/2022)    furosemide (LASIX) 40 MG tablet Take 40 mg by mouth daily as needed.    omeprazole (PRILOSEC) 40 MG capsule Take 1 capsule (40 mg total) by mouth 2 (two) times daily before meals.    oxyCODONE-acetaminophen (PERCOCET)  mg per tablet Take 1 tablet by mouth every 6 (six) hours as needed for Pain.    oxyCODONE-acetaminophen (PERCOCET)  mg per tablet Take one tablet by mouth every 4-6 hours as needed for pain    oxyCODONE-acetaminophen (PERCOCET)  mg per tablet Take one tablet by mouth every 4 to 6 hours as needed for breakthrough pain    oxyCODONE-acetaminophen (PERCOCET)  mg per tablet Take one tablet by mouth every 4 to 6 hours as needed for breakthrough pain    pregabalin (LYRICA) 200 MG Cap TAKE 1 CAPSULE(200 MG) BY MOUTH THREE TIMES DAILY    [DISCONTINUED] pantoprazole (PROTONIX) 40 MG tablet Take 1 tablet (40 mg total) by mouth 2 (two) times daily.     Family History       Problem Relation (Age of Onset)    COPD Brother    Coronary artery disease Father     Depression Father    Diabetes Mother, Father, Sister, Brother    Heart disease Father    Hypertension Mother, Father    Irritable bowel syndrome Mother          Tobacco Use    Smoking status: Never Smoker    Smokeless tobacco: Never Used   Substance and Sexual Activity    Alcohol use: No    Drug use: No    Sexual activity: Not Currently     Review of Systems   Unable to perform ROS: Mental status change   Cardiovascular:  Chest pain: Head:.   Objective:     Vital Signs (Most Recent):  Temp: 97.5 °F (36.4 °C) (06/15/22 0245)  Pulse: 91 (06/15/22 0245)  Resp: 14 (06/15/22 0245)  BP: (!) 94/50 (06/15/22 0245)  SpO2: 100 % (06/15/22 0245)   Vital Signs (24h Range):  Temp:  [97.5 °F (36.4 °C)] 97.5 °F (36.4 °C)  Pulse:  [83-91] 91  Resp:  [14] 14  SpO2:  [90 %-100 %] 100 %  BP: ()/(50-74) 94/50     Weight: 75.3 kg (166 lb)  Body mass index is 26.79 kg/m².    Physical Exam  Vitals and nursing note reviewed.   Constitutional:       General: She is not in acute distress.     Appearance: She is well-developed. She is ill-appearing. She is not diaphoretic.   HENT:      Head: Normocephalic.      Mouth/Throat:      Mouth: Mucous membranes are dry.   Eyes:      General: No scleral icterus.     Conjunctiva/sclera: Conjunctivae normal.      Pupils: Pupils are equal, round, and reactive to light.   Neck:      Vascular: No JVD.   Cardiovascular:      Rate and Rhythm: Regular rhythm. Tachycardia present.      Heart sounds: Normal heart sounds. No murmur heard.    No friction rub. No gallop.   Pulmonary:      Effort: Pulmonary effort is normal. No respiratory distress.      Breath sounds: Normal breath sounds. No wheezing or rales.   Abdominal:      General: Bowel sounds are normal. There is no distension.      Palpations: Abdomen is soft.      Tenderness: There is no abdominal tenderness. There is no guarding or rebound.   Musculoskeletal:         General: No tenderness. Normal range of motion.      Cervical back: Normal range  of motion and neck supple.   Lymphadenopathy:      Cervical: No cervical adenopathy.   Skin:     General: Skin is warm and dry.      Capillary Refill: Capillary refill takes less than 2 seconds.      Coloration: Skin is pale.      Findings: No erythema or rash.   Neurological:      Mental Status: She is disoriented.      Comments: Confused to verbal questioning         CRANIAL NERVES     CN III, IV, VI   Pupils are equal, round, and reactive to light.     Significant Labs: All pertinent labs within the past 24 hours have been reviewed.  Blood Culture: No results for input(s): LABBLOO in the last 48 hours.  CBC:   Recent Labs   Lab 06/14/22  2344   WBC 10.50   HGB 11.3*   HCT 35.7*        CMP:   Recent Labs   Lab 06/14/22  2345      K 4.9   *   CO2 18*      BUN 31*   CREATININE 1.9*   CALCIUM 9.2   PROT 6.1   ALBUMIN 3.4*   BILITOT 0.4   ALKPHOS 100   AST 35   ALT 18   ANIONGAP 13   EGFRNONAA 26*     Cardiac Markers: No results for input(s): CKMB, MYOGLOBIN, BNP, TROPISTAT in the last 48 hours.  Lactic Acid:   Recent Labs   Lab 06/14/22  2357 06/15/22  0319   LACTATE 1.4 3.6*     Urine Culture: No results for input(s): LABURIN in the last 48 hours.  Urine Studies:   Recent Labs   Lab 06/15/22  0215   COLORU Yellow   APPEARANCEUA Clear   PHUR 6.0   SPECGRAV 1.020   PROTEINUA Negative   GLUCUA Negative   KETONESU Negative   BILIRUBINUA Negative   OCCULTUA 1+*   NITRITE Negative   UROBILINOGEN Negative   LEUKOCYTESUR Negative   RBCUA 3   WBCUA 1   BACTERIA None   SQUAMEPITHEL 1       Significant Imaging: I have reviewed all pertinent imaging results/findings within the past 24 hours.    CT abdomen pelvis:    IMPRESSION:  1. Colonic obstruction near the rectosigmoid junction secondary to constipation.  2. Gastric hiatal hernia.  3. Nonobstructive left-sided nephrolithiasis. Negative for ureteral calculus/obstruction.  4. Anemia, as described above.  5. Additional nonacute findings as described  above    CT head:    IMPRESSION:  1. Nonspecific white matter changes likely related to nonacute microvascular etiology. No acute  intracranial findings.  2. Age related brain atrophy and vascular calcifications.  3. Additional nonacute findings as described above.

## 2022-06-15 NOTE — ASSESSMENT & PLAN NOTE
"This patient does have evidence of infective focus  My overall impression is sepsis. Vital signs were reviewed and noted in progress note.  Antibiotics given-   Antibiotics (From admission, onward)            Start     Stop Route Frequency Ordered    06/15/22 0930  piperacillin-tazobactam 4.5 g in dextrose 5 % 100 mL IVPB (ready to mix system)         -- IV Every 8 hours (non-standard times) 06/15/22 0402    06/15/22 0502  vancomycin - pharmacy to dose  (vancomycin IVPB)        "And" Linked Group Details    -- IV pharmacy to manage frequency 06/15/22 0402        Cultures were taken-   Microbiology Results (last 7 days)     Procedure Component Value Units Date/Time    Blood culture x two cultures. Draw prior to antibiotics. [[id number]] Collected: 06/14/22 2357    Order Status: Sent Specimen: Blood Updated: 06/14/22 2358    Blood culture x two cultures. Draw prior to antibiotics. [[id number]] Collected: 06/14/22 2358    Order Status: Sent Specimen: Blood from Peripheral, Right Updated: 06/14/22 2358        Latest lactate reviewed, they are-  Recent Labs   Lab 06/14/22  2357 06/15/22  0319   LACTATE 1.4 3.6*       Organ dysfunction indicated by Acute kidney injury  Source- unknown    Source control Achieved by- vancomycin Zosyn    "

## 2022-06-15 NOTE — PLAN OF CARE
POC reviewed with the patient and her spouse at bedside; they verbalized understanding. All comments and concerns addressed.     Patient more alert since arrival to unit. Now AxOx4. Drowsiness noted. 2L NC. NSR on monitor, normotensive. Boyd in place--peace urine. BM x2 since arrival to ICU. Receiving q4h soap suds enemas--last given at 1545. Sam REID stated to continue enemas unless output becomes clear of stool. PRN percocet given x1.     Bed locked in lowest position with bed alarm set, call light within reach. Safety precautions maintained.

## 2022-06-15 NOTE — NURSING
Patient arrived from ED with RN x2 via bed. Patient drowsy/arouses to voice. Oriented to self, time. Staff oriented patient to ICU setting. Skin assessment completed with no issues to report--foam sacral dressing in place for prevention. Boyd in place on arrival--peace urine noted. Abdominal tenderness noted and umbilical hernia present. PIV x2. Patient placed on ICU monitor. Sam REID notified of patient arrival. Patient ordered to receive q4hr soap suds enemas for severe constipation.  at bedside--updated on plan of care.    Bed alarm set and call light within reach. Safety precautions maintained.

## 2022-06-15 NOTE — ASSESSMENT & PLAN NOTE
Acute problem  Patient has acute metabolic encephalopathy that is secondary to Sepsis/Infective. Patient's current mental status is Confused. Patient's baseline mental status is. awake and alert; oriented to person, place, and time Evaluation and for underlying cause(s) is underway and inclusive of Blood Chemistries, Consult to Neurology, Neuro-Imaging (MRI/CT) and Toxic metabolite eval . Will monitor neuro checks carefully, avoid narcotics and benzos that will exacerbate agitation, and use PRN medications for controls of behavior for self harm.

## 2022-06-15 NOTE — PT/OT/SLP PROGRESS
Physical Therapy      Patient Name:  Froilan Ray   MRN:  [id number]    Patient not seen today secondary to Patient ill (Comment), Other (Comment), Pain (Pt requested PT to return on tomorrow. She is in pain all over). Will follow-up 6/16/22.

## 2022-06-15 NOTE — ED PROVIDER NOTES
Encounter Date: 6/14/2022    SCRIBE #1 NOTE: IShonna, am scribing for, and in the presence of, Augie Singer MD.       History     Chief Complaint   Patient presents with    Fall    Vomiting     Time seen by provider: 11:29 PM on 06/14/2022    Froilan Ray is a 76 y.o. female who presents to the ED via EMS with N/V, lower abdominal pain, and lower back pain that began today. Pt has a Hx of stomach ulcers and she received an iron transfusion yesterday. Pt's blood pressure is 74 manual per EMS.  Four days ago, patient had an esophagogastroduodenoscopy. EMS reports pt's heart rate is 64-66 and has a CBG of 93. Pt denies diarrhea, blood in vomit, and blood in stool. Pt has an AMS change within the last 15 minutes. The patient denies any other symptoms at this time. PMHx of anemia, umbilical hernia, IBS, HTN, and encounter for blood transfusion. PSHx of colonoscopy, endoscopic ultrasound of upper gastrointestinal tract, and hysterectomy.     The history is provided by the patient, the EMS personnel and a relative.     Review of patient's allergies indicates:  No Known Allergies  Past Medical History:   Diagnosis Date    Anemia     Arthritis     Bleeding ulcer 07/2016    Chronic pain     DDD (degenerative disc disease), cervical     DDD (degenerative disc disease), lumbar     Depression     Disc degeneration, lumbosacral     Diverticulitis     Encounter for blood transfusion     Hypertension     IBS (irritable bowel syndrome)     Neuropathy of both feet     Seizures     none  since 2017    Umbilical hernia 2020     Past Surgical History:   Procedure Laterality Date    BACK SURGERY      BREAST SURGERY Right     lumpectomy    CAUDAL EPIDURAL STEROID INJECTION N/A 1/28/2022    Procedure: Injection-steroid-epidural-caudal;  Surgeon: Luzmaria Marcelino MD;  Location: [facility] OR;  Service: Pain Management;  Laterality: N/A;    COLONOSCOPY N/A 1/14/2022    Procedure: COLONOSCOPY;  Surgeon:  Abby Landers MD;  Location: [facility];  Service: Endoscopy;  Laterality: N/A;    ENDOSCOPIC ULTRASOUND OF UPPER GASTROINTESTINAL TRACT N/A 7/21/2020    Procedure: ULTRASOUND, UPPER GI TRACT, ENDOSCOPIC;  Surgeon: Marcio Nguyễn III, MD;  Location: [facility] ENDO;  Service: Endoscopy;  Laterality: N/A;    ESOPHAGOGASTRODUODENOSCOPY N/A 1/14/2022    Procedure: EGD (ESOPHAGOGASTRODUODENOSCOPY);  Surgeon: Abby Landers MD;  Location: [facility] ENDO;  Service: Endoscopy;  Laterality: N/A;    ESOPHAGOGASTRODUODENOSCOPY N/A 6/10/2022    Procedure: EGD (ESOPHAGOGASTRODUODENOSCOPY);  Surgeon: Abby Landers MD;  Location: [facility] ENDO;  Service: Endoscopy;  Laterality: N/A;    EYE SURGERY      cataract    HYSTERECTOMY      INTRAMEDULLARY RODDING OF TROCHANTER OF FEMUR Left 12/11/2018    Procedure: INSERTION, INTRAMEDULLARY SANTOS, FEMUR, TROCHANTER;  Surgeon: Eulalio De La Cruz MD;  Location: [facility];  Service: Orthopedics;  Laterality: Left;    SPINAL CORD STIMULATOR IMPLANT  09/18/2013    and removal    SPINE SURGERY  2006    lumbar L2-S1 decompression.    SPINE SURGERY      cervical decompression    TONSILLECTOMY       Family History   Problem Relation Age of Onset    Diabetes Mother     Hypertension Mother     Irritable bowel syndrome Mother     Diabetes Father         insulin dependent    Hypertension Father     Heart disease Father     Coronary artery disease Father     Depression Father     Diabetes Sister     Diabetes Brother     COPD Brother      Social History     Tobacco Use    Smoking status: Never Smoker    Smokeless tobacco: Never Used   Substance Use Topics    Alcohol use: No    Drug use: No     Review of Systems   Constitutional: Negative for activity change, diaphoresis and fever.   HENT: Negative for ear pain, rhinorrhea, sore throat and trouble swallowing.    Eyes: Negative for pain and visual disturbance.   Respiratory: Negative for cough, shortness of breath and stridor.     Cardiovascular: Negative for chest pain.   Gastrointestinal: Positive for abdominal pain, nausea and vomiting. Negative for blood in stool and diarrhea.        - blood in vomit     Genitourinary: Negative for dysuria, hematuria, vaginal bleeding and vaginal discharge.   Musculoskeletal: Positive for back pain. Negative for gait problem.   Skin: Negative for rash and wound.   Neurological: Negative for seizures and headaches.   Psychiatric/Behavioral: Negative for hallucinations and suicidal ideas.       Physical Exam     Initial Vitals   BP Pulse Resp Temp SpO2   06/14/22 2344 06/14/22 2344 06/14/22 2350 06/15/22 0245 06/14/22 2344   (!) 95/55 85 14 97.5 °F (36.4 °C) (!) 90 %      MAP       --                Physical Exam    Nursing note and vitals reviewed.  Constitutional: She is not diaphoretic. She appears distressed.   Sleepy but arousable.  Pale and ill-appearing.   HENT:   Head: Normocephalic and atraumatic.   Nose: Nose normal.   Eyes: EOM are normal. No scleral icterus.   Neck: Neck supple.   Normal range of motion.  Cardiovascular: Normal rate, regular rhythm, normal heart sounds and intact distal pulses. Exam reveals no gallop and no friction rub.    No murmur heard.  Pulmonary/Chest: Breath sounds normal. No stridor. No respiratory distress. She has no wheezes. She has no rhonchi. She has no rales.   Abdominal: Abdomen is soft. Bowel sounds are normal. She exhibits no distension. There is no abdominal tenderness. There is no rebound and no guarding.   Musculoskeletal:         General: Normal range of motion.      Cervical back: Normal range of motion and neck supple.     Neurological: She is alert. She has normal strength. No cranial nerve deficit or sensory deficit.   Sleepy but arousable to painful stimuli.  Oriented to person but otherwise confused.   Skin: Skin is warm and dry. Capillary refill takes less than 2 seconds. No rash noted.   Psychiatric: She has a normal mood and affect.         ED  Course   Critical Care    Date/Time: 6/15/2022 6:30 AM  Performed by: Augie Singer MD  Authorized by: Augie Singer MD   Total critical care time (exclusive of procedural time) : 0 minutes  Critical care was necessary to treat or prevent imminent or life-threatening deterioration of the following conditions: CNS failure or compromise and shock.  Critical care was time spent personally by me on the following activities: obtaining history from patient or surrogate, pulse oximetry, review of old charts, ordering and review of laboratory studies, re-evaluation of patient's condition, ordering and review of radiographic studies, ordering and performing treatments and interventions, examination of patient and evaluation of patient's response to treatment.        Labs Reviewed   CBC W/ AUTO DIFFERENTIAL - Abnormal; Notable for the following components:       Result Value    RBC 3.80 (*)     Hemoglobin 11.3 (*)     Hematocrit 35.7 (*)     MCHC 31.7 (*)     RDW 15.2 (*)     Gran # (ANC) 9.2 (*)     Immature Grans (Abs) 0.05 (*)     Lymph # 0.5 (*)     Gran % 87.6 (*)     Lymph % 4.4 (*)     All other components within normal limits   COMPREHENSIVE METABOLIC PANEL - Abnormal; Notable for the following components:    Chloride 111 (*)     CO2 18 (*)     BUN 31 (*)     Creatinine 1.9 (*)     Albumin 3.4 (*)     eGFR if  30 (*)     eGFR if non  26 (*)     All other components within normal limits   URINALYSIS, REFLEX TO URINE CULTURE - Abnormal; Notable for the following components:    Occult Blood UA 1+ (*)     All other components within normal limits    Narrative:     Specimen Source->Urine   LACTIC ACID, PLASMA - Abnormal; Notable for the following components:    Lactate (Lactic Acid) 3.6 (*)     All other components within normal limits    Narrative:     Lactic Acid    critical result(s) called and verbal readback obtained   from Pa Mac RN.  by TH1 06/15/2022 03:46    COMPREHENSIVE METABOLIC PANEL - Abnormal; Notable for the following components:    CO2 16 (*)     Glucose 126 (*)     BUN 28 (*)     Calcium 8.6 (*)     Total Protein 5.5 (*)     Albumin 2.9 (*)     eGFR if  47 (*)     eGFR if non  41 (*)     All other components within normal limits   PROCALCITONIN - Abnormal; Notable for the following components:    Procalcitonin 8.98 (*)     All other components within normal limits    Narrative:     Collection has been rescheduled by [facility] at 06/15/2022 05:28 Reason:   Unable to collect   CBC W/ AUTO DIFFERENTIAL - Abnormal; Notable for the following components:    RBC 3.78 (*)     Hemoglobin 11.2 (*)     Hematocrit 35.9 (*)     MCHC 31.2 (*)     RDW 15.0 (*)     Gran % 84.0 (*)     Lymph % 8.0 (*)     Mono % 1.0 (*)     All other components within normal limits   LACTIC ACID, PLASMA - Abnormal; Notable for the following components:    Lactate (Lactic Acid) 4.0 (*)     All other components within normal limits    Narrative:     Lab repeat please.   lactic acid  critical result(s) called and verbal readback obtained   from Yomaira Denton by [facility] 06/15/2022 07:34   LACTIC ACID, PLASMA - Abnormal; Notable for the following components:    Lactate (Lactic Acid) 2.9 (*)     All other components within normal limits   ISTAT PROCEDURE - Abnormal; Notable for the following components:    POC PH 7.318 (*)     POC PO2 65 (*)     POC HCO3 19.9 (*)     POC SATURATED O2 91 (*)     POC TCO2 21 (*)     All other components within normal limits   LACTIC ACID, PLASMA   URINALYSIS MICROSCOPIC    Narrative:     Specimen Source->Urine   SARS-COV-2 RNA AMPLIFICATION, QUAL   AMMONIA    Narrative:     Collection has been rescheduled by [facility] at 06/15/2022 05:28 Reason:   Unable to collect   MAGNESIUM   PHOSPHORUS   LACTIC ACID, PLASMA   POCT GLUCOSE, HAND-HELD DEVICE   POCT GLUCOSE, HAND-HELD DEVICE   TYPE & SCREEN   POCT GLUCOSE     EKG Readings: (Independently  Interpreted)   Rhythm: Normal Sinus Rhythm. Heart Rate: 85.   Minimal voltage criteria for LVH, may be normal variant. Cannot rule out Anterior infarct, age undetermined.     ECG Results          EKG 12-lead (In process)  Result time 06/15/22 08:46:04    In process by Interface, Lab In [facility] (06/15/22 08:46:04)                 Narrative:    Test Reason : K92.2,    Vent. Rate : 085 BPM     Atrial Rate : 085 BPM     P-R Int : 156 ms          QRS Dur : 080 ms      QT Int : 384 ms       P-R-T Axes : 065 -08 009 degrees     QTc Int : 456 ms    Normal sinus rhythm  Minimal voltage criteria for LVH, may be normal variant  Cannot rule out Anterior infarct ,age undetermined  Abnormal ECG  When compared with ECG of 22-AUG-2020 11:54,  Nonspecific T wave abnormality no longer evident in Anterior leads    Referred By: [email or web address]   SELF           Confirmed By:                             Imaging Results          CT Abdomen Pelvis With Contrast (Final result)  Result time 06/15/22 09:39:01   Procedure changed from CT Abdomen Pelvis  Without Contrast     Final result by Rick Berrios Jr., MD (06/15/22 09:39:01)                 Impression:      Fluid-filled distension of most of the colon with stool noted in the distal sigmoid colon and rectum may represent a fecal impaction/severe constipation.  Small-bowel obstruction is not seen.  Moderate size hiatal hernia.  Mild atelectasis at the left lung base.  Prior hysterectomy.  Prior ORIF of the left hip.  Lumbar scoliosis and degenerative disc disease at every level.  Gallbladder distention is now seen although a stone or gallbladder wall thickening is not seen.  Small umbilical hernia containing fluid and fat.      Electronically signed by: Rick Berrios MD  Date:    06/15/2022  Time:    09:39             Narrative:    EXAMINATION:  CT ABDOMEN PELVIS WITH CONTRAST    CLINICAL HISTORY:  Abdominal pain, acute, nonlocalized;    TECHNIQUE:  Low dose axial images, sagittal and  coronal reformations were obtained from the lung bases to the pubic symphysis following the IV administration of 75 mL of Omnipaque 350    COMPARISON:  CT abdomen of Yanira 15, 2020 at 00:19    FINDINGS:  There is mild atelectasis at the left lung base.  The liver is of normal size and CT density without focal defect.  The gallbladder is distended without CT evidence of stone.  The pancreas is severely fatty atrophy without edema or mass.  The spleen is of normal size and CT density.    The adrenal glands are not enlarged.  The kidneys are of normal size and contrast enhancement.  A 3 mm left intrarenal stone is seen without hydronephrosis.  No masses are noted.  The abdominal aorta and inferior vena cava are of normal caliber.    There is a moderate size hiatal hernia.  Small bowel dilatation or air-fluid levels are not seen.  There is an umbilical hernia which contains some fluid and fat.  There is noted distention of the colon filled with fluid down to the sigmoid colon and rectum was is filled with stool.  This could represent a fecal impaction are constipation.  Free air in the abdomen is not seen.    The bladder is no empty with a Boyd catheter in place.  A uterus is not seen.  The patient has had prior ORIF of the left hip.                               X-Ray Chest AP Portable (Final result)  Result time 06/15/22 07:47:36    Final result by Rick Berrios Jr., MD (06/15/22 07:47:36)                 Impression:      No acute abnormality.      Electronically signed by: Rick Berrios MD  Date:    06/15/2022  Time:    07:47             Narrative:    EXAMINATION:  XR CHEST AP PORTABLE    CLINICAL HISTORY:  Sepsis;    TECHNIQUE:  Single frontal view of the chest was performed.    COMPARISON:  Chest of August 22, 2020    FINDINGS:  The lungs are clear, with normal appearance of pulmonary vasculature and no pleural effusion or pneumothorax.    The cardiac silhouette is normal in size. The hilar and mediastinal  contours are unremarkable.    Bones are intact.                               CT Abdomen Pelvis  Without Contrast (Final result)  Result time 06/15/22 08:00:52   Procedure changed from CT Abdomen Pelvis With Contrast     Final result by Rick Berrios Jr., MD (06/15/22 08:00:52)                 Impression:      Constipation.  Prior hysterectomy.  3 mm left intrarenal stone.  Prior granulomatous disease.  Moderate size hiatal hernia.    Lumbar scoliosis.  Degenerative disc disease at every level of the lumbar spine.  Prior ORIF left hip.      Electronically signed by: Rick Berrios MD  Date:    06/15/2022  Time:    08:00             Narrative:    EXAMINATION:  CT ABDOMEN PELVIS WITHOUT CONTRAST    CLINICAL HISTORY:  GI bleed;    TECHNIQUE:  Low dose axial images, sagittal and coronal reformations were obtained from the lung bases to the pubic symphysis.    COMPARISON:  CT abdomen and pelvis of November 15, 2020 at    FINDINGS:  The liver is of normal size contour and CT density without focal mass.  A single calcified granuloma is seen near the falciform ligament.  The gallbladder is of normal size without CT evidence of stone.  The pancreas is fatty atrophied without a focal mass or edema is seen.  The spleen is small and of normal CT density.  Calcified granuloma is noted in the left lower lobe.    The adrenal glands are not enlarged.  The kidneys are of normal size contour and CT density.  There is a 3 mm calcification in the lower pole calyx of the left kidney without hydronephrosis or mass.  The abdominal aorta and inferior vena cava are of normal caliber.    There is a moderate size hiatal hernia.  There is an umbilical hernia containing fat and no bowel loop.  Small bowel distention or air-fluid levels are not seen.  The colon is full of stool and dilated down to the descending colon.  There is also stool noted in the rectum.  This may represent constipation.  Free fluid or free air is not seen.    The  bladder is of normal contour without mass or asymmetry.  A uterus is not seen.  The patient has had left hip ORIF without metal artifact noted.  Deformity of the right side of the pubic symphysis may be due to old fracture.    The patient is seen to have lumbar scoliosis convex to the left and degenerative disc disease at every level of the lumbar spine.                               CT Head Without Contrast (Final result)  Result time 06/15/22 06:29:46    Final result by Mitul Cuellar MD (06/15/22 06:29:46)                 Impression:      1. There is no acute abnormality.  There is volume loss and nonspecific white matter change.  There is no acute hemorrhage, mass effect or obvious acute infarction.  It should be noted that MRI is more sensitive in the detection of subtle or acute nonhemorrhagic ischemic disease.  Note: Preliminary results were provided by Dr. Otoniel Silva ([facility]).  There is no significant discrepancy.      Electronically signed by: Mitul Cuellar MD  Date:    06/15/2022  Time:    06:29             Narrative:    EXAMINATION:  CT HEAD WITHOUT CONTRAST    CLINICAL HISTORY:  Mental status change, unknown cause;    TECHNIQUE:  Routine unenhanced axial images were obtained through the head.  Sagittal and coronal reformatted images were created.  The study is reviewed in bone and soft tissue windows.    COMPARISON:  Head CT dated 10/04/2019    FINDINGS:  Intracranial contents: There is no acute abnormality or detectable change in the appearance of the brain compared to the prior study.  There is generalized volume loss and nonspecific white matter change.  There is no intracranial hemorrhage, mass or obvious acute infarction.  The gray-white interface is preserved.  There is no midline shift.  There is no abnormal extra-axial fluid collection.  The basilar cisterns are open.  The cerebellar tonsils are normal position.  The patient has had prior left lens replacement surgery.  The orbits are  otherwise grossly normal.    Extracranial contents, calvarium, soft tissues: The calvarium is normal.  There are changes of hyperostosis frontalis interna.  The nasal septum is deviated to the left.  The paranasal sinuses and mastoid air cells are grossly clear.  There is degeneration at the TMJ bilaterally.                                 Medications   naloxone injection 1 mg (has no administration in time range)   sodium chloride 0.9% flush 10 mL (has no administration in time range)   albuterol-ipratropium 2.5 mg-0.5 mg/3 mL nebulizer solution 3 mL (has no administration in time range)   ondansetron injection 4 mg (has no administration in time range)   simethicone chewable tablet 80 mg (has no administration in time range)   aluminum-magnesium hydroxide-simethicone 200-200-20 mg/5 mL suspension 30 mL (has no administration in time range)   acetaminophen tablet 650 mg (has no administration in time range)   potassium bicarbonate disintegrating tablet 50 mEq (has no administration in time range)   potassium bicarbonate disintegrating tablet 35 mEq (has no administration in time range)   potassium bicarbonate disintegrating tablet 60 mEq (has no administration in time range)   magnesium oxide tablet 800 mg (has no administration in time range)   magnesium oxide tablet 800 mg (has no administration in time range)   potassium, sodium phosphates 280-160-250 mg packet 2 packet (has no administration in time range)   potassium, sodium phosphates 280-160-250 mg packet 2 packet (has no administration in time range)   potassium, sodium phosphates 280-160-250 mg packet 2 packet (has no administration in time range)   glucose chewable tablet 16 g (has no administration in time range)   glucose chewable tablet 24 g (has no administration in time range)   glucagon (human recombinant) injection 1 mg (has no administration in time range)   dextrose 10% bolus 125 mL (has no administration in time range)   dextrose 10% bolus 250  mL (has no administration in time range)   acetaminophen tablet 650 mg (has no administration in time range)   vancomycin - pharmacy to dose (has no administration in time range)   piperacillin-tazobactam 4.5 g in dextrose 5 % 100 mL IVPB (ready to mix system) (4.5 g Intravenous New Bag 6/17/22 0114)   sodium phosphates 9.5-3.5 gram/59 mL enema 1 enema (has no administration in time range)   fluconazole (DIFLUCAN) IVPB 200 mg (0 mg Intravenous Stopped 6/16/22 0945)   polyethylene glycol packet 17 g (17 g Oral Given 6/16/22 2005)   oxyCODONE-acetaminophen  mg per tablet 1 tablet (1 tablet Oral Given 6/16/22 2111)   0.9%  NaCl infusion ( Intravenous Stopped 6/16/22 1722)   0.9%  NaCl infusion (0 mL/hr Intravenous Stopped 6/16/22 1047)   mupirocin 2 % ointment ( Nasal Given 6/16/22 2005)   diltiaZEM (CARDIZEM) 5 mg/mL injection (  Canceled Entry 6/16/22 0945)   0.9%  NaCl infusion ( Intravenous Verify Only 6/16/22 1859)   amiodarone 360 mg/200 mL (1.8 mg/mL) infusion (0 mg/min Intravenous Stopped 6/16/22 1754)   amiodarone 360 mg/200 mL (1.8 mg/mL) infusion (0.5 mg/min Intravenous Verify Only 6/16/22 1859)   docusate sodium capsule 100 mg (100 mg Oral Given 6/16/22 2005)   vancomycin 1.5 g in dextrose 5 % 250 mL IVPB (ready to mix) (1,500 mg Intravenous Trough Due As Scheduled Before Dose 6/18/22 1900)   metoprolol tartrate (LOPRESSOR) tablet 25 mg (25 mg Oral Given 6/16/22 2006)   heparin 25,000 units in dextrose 5% 250 mL (100 units/mL) infusion LOW INTENSITY nomogram - [facility] (12 Units/kg/hr × 67.7 kg (Adjusted) Intravenous Verify Only 6/16/22 1859)   heparin 25,000 units in dextrose 5% (100 units/ml) IV bolus from bag - ADDITIONAL PRN BOLUS - 60 units/kg (has no administration in time range)   heparin 25,000 units in dextrose 5% (100 units/ml) IV bolus from bag - ADDITIONAL PRN BOLUS - 30 units/kg (has no administration in time range)   lactated ringers bolus 2,259 mL (0 mLs Intravenous Stopped 6/15/22 1635)    vancomycin (VANCOCIN) 2,000 mg in dextrose 5 % 500 mL IVPB (0 mg Intravenous Stopped 6/15/22 0215)   naloxone (NARCAN) 0.4 mg/mL injection (0.4 mg  Given 6/15/22 0302)   0.9%  NaCl infusion ( Intravenous New Bag 6/15/22 0518)   fluconazole (DIFLUCAN) IVPB 400 mg (0 mg Intravenous Stopped 6/15/22 1038)   iohexoL (OMNIPAQUE 350) injection 75 mL (75 mLs Intravenous Given 6/15/22 0904)   lactated ringers bolus 1,000 mL (0 mLs Intravenous Stopped 6/15/22 1046)   mineral oil enema 1 enema (1 enema Rectal Given 6/15/22 1059)   vancomycin 1.5 g in dextrose 5 % 250 mL IVPB (ready to mix) (0 mg Intravenous Stopped 6/15/22 2135)   diltiaZEM injection 10 mg ( Intravenous Canceled Entry 6/16/22 1045)   amiodarone in dextrose 150 mg/100 mL (1.5 mg/mL) loading dose 150 mg (0 mg Intravenous Stopped 6/16/22 1135)   digoxin injection 375 mcg ( Intravenous Canceled Entry 6/16/22 1230)   metoprolol injection 5 mg (5 mg Intravenous Given 6/16/22 1321)   heparin 25,000 units in dextrose 5% (100 units/ml) IV bolus from bag INITIAL BOLUS (4,060 Units Intravenous Bolus from Bag 6/16/22 1447)   iohexoL (OMNIPAQUE 350) injection 75 mL (75 mLs Intravenous Given 6/16/22 1537)   digoxin injection 250 mcg (250 mcg Intravenous Given 6/17/22 0032)     Medical Decision Making:   History:   Old Medical Records: I decided to obtain old medical records.  Independently Interpreted Test(s):   I have ordered and independently interpreted EKG Reading(s) - see prior notes  Clinical Tests:   Lab Tests: Ordered and Reviewed  Radiological Study: Ordered and Reviewed  Medical Tests: Reviewed and Ordered  ED Management:  Patient's blood pressure improved after IV fluids.  Patient covered with broad-spectrum antibiotics.  Unclear etiology for encephalopathy at this time will admit to Hospital Medicine for further management.          Scribe Attestation:   Scribe #1: I performed the above scribed service and the documentation accurately describes the services I  performed. I attest to the accuracy of the note.        ED Course as of 06/17/22 0116   Tue Jun 14, 2022   0226 76-year-old female with PMHx significant for HTN, anemia, chronic pain maintained on opiate therapy, and seizure disorder pw nausea, vomiting, hypotension and AMS. AF. Hypotensive SBP 70s on arrival.   [BD]   2359 EGD 6/10 by Dr. Landers  Impression:            - Normal esophagus.                          - A medium amount of food (residue) in the stomach.                          - Non-bleeding gastric ulcer with no stigmata of                          bleeding. Biopsied.                          - Erythematous mucosa in the antrum. Biopsied.                          - Normal examined duodenum.  [BD]   Wed Tyler 15, 2022   0037 Creatinine(!): 1.9 [BD]   0114 IMPRESSION:  1. Colonic obstruction near the rectosigmoid junction secondary to constipation.  2. Gastric hiatal hernia.  3. Nonobstructive left-sided nephrolithiasis. Negative for ureteral calculus/obstruction.  4. Anemia, as described above.  5. Additional nonacute findings as described above.  Thank you for allowing us to participate in the care of your patient.  Dictated and Authenticated by: Otoniel Silva MD  06/15/2022 12:49 AM Central Time ([place] & [place]) [BD]   0241 IMPRESSION:  1. Nonspecific white matter changes likely related to nonacute microvascular etiology. No acute  intracranial findings.  2. Age related brain atrophy and vascular calcifications.  3. Additional nonacute findings as described above.  Thank you for allowing us to participate in the care of your patient.  Dictated and Authenticated by: Otoniel Silva MD  06/15/2022 1:29 AM Central Time ([place] & [place]) [BD]   0709 Patient signed out to me by Dr. Singer for pending admission.  Patient presented with initial hypotension and confusion associated with emesis.  Workup showed resolution of hypotension with volume challenge and initially normal lactic acid.  Mental status waxed and  linda.  Vomiting is now controlled.  Patient does remain confused at this point.  Workup notably includes CT read as colonic obstruction at the rectosigmoid junction secondary to constipation.  On my reassessment of the patient she remains confused with GCS of 14. Abdomen is notably distended with marked, diffuse voluntary guarding.  No palpable hernia.  At this point I am repeating lactic acid since 2nd value had trended upwards to see if this pattern continues.  I have also spoken with Dr. Romero from general surgery regarding concerns for colonic obstruction in the setting of elevated lactic acid and present abdominal exam.  He will see her as well.   [MR]   0809 D/w Dr. Romero again who is in OR.  He did review CT imaging although has not seen patient.  He notes patient not surgical candidate given appearance of CT favoring constipation w/o surgical lesion. Advised patient should be transferred if needed for ICU care. [MR]   0825 D/w Dr. Romero again who will see patient in approx 30 minutes when he is available.  I discussed repeat exam with concern for marked tenderness and he advises repeat CT to ensure no evolving issue such as bowel perforation. [MR]   0829 D/w txf center to hold on ICU txf pending repeat imaging and surgical evaluation.  Will call back once this has been completed for update. [MR]   0910 Dr. Romero has seen patient.  Patient off for repeat CT-AP. [MR]   1033 D/w Dr. Romero his exam and repeat CT.  No acute surgical issues.  Rec serial enemas for likely constipation/impaction.   [MR]      ED Course User Index  [BD] Augie Singer MD  [MR] Ger Hay MD            Attending Attestation:     Physician Attestation for Scribe:    I, Dr. Augie Singer, personally performed the services described in this documentation.   All medical record entries made by the scribe were at my direction and in my presence.   I have reviewed the chart and agree that the record is accurate and  complete.   Augie Singer MD  6:28 AM 06/17/2022     DISCLAIMER: This note was prepared with [employer] Naturally Speaking voice recognition transcription software. Garbled syntax, mangled pronouns, and other bizarre constructions may be attributed to that software system.      Clinical Impression:   Final diagnoses:  [K92.2] GI bleed  [N17.9] PEDRO (acute kidney injury)  [K56.609] Colonic obstruction (Primary)  [K59.00] Constipation, unspecified constipation type  [G93.40] Encephalopathy          ED Disposition Condition    Admit               Augie Singer MD  06/15/22 0629       Augie Singer MD  06/15/22 0632       Augie Singer MD  06/17/22 0117

## 2022-06-15 NOTE — CONSULTS
[facility]     CC: Abdominal pain    HPI 76 y.o. female who presents emergency room for evaluation of nausea, vomiting, abdominal pain, and altered mental status.  EMS reported to ER staff that upon their arrival patient's blood pressure was 74 systolic.  At the time of my exam patient was altered.  No family available at bedside.  All information was obtained from [facility] medical records as well as ER physician.  Previous medical history includes left hip fracture, drug induced constipation, hypertension, iron deficiency anemia, and seizure disorder.  ER workup:  CBC with mild anemia.  CMP with BUN of 31 creatinine 1.9 with bicarb of 18. Initial lactic acid was 1.4.  Second lactic acid was 3.6.  ABG with pH of 7.3, PO2 of 65, bicarb 19, pCO2 of 38. CT of the head was negative for any acute findings.  CT abdomen and pelvis demonstrates a colonic obstruction secondary to constipation of the rectosigmoid junction.  Patient was started on septic protocol.  Patient was given IV fluids.  Patient started on  vancomycin and Zosyn.  Patient admitted to Hospital Medicine for observation management.  Patient be placed in ICU.  Will consult Neurology as well as GI.    FURTHER HISTORY:  Above obtained from independent review of records from admitting provider as well as from direct discussion with Bob Vargas and Nick who state patient has evidence of rectosigmoid obstipation on imaging.  In addition, on my interview, I note the following:  Patient complains of abdominal pain, lower abdomen, moderate to severe, with no alleviating/exacerbating factors.  She states onset is recent.  Denies bleeding.  Imaging reviewed and shows stool impaction.  Recent colonoscopy unremarkable.  Recent EGD showed small gastric ulcer.  Patient admits to long term narcotic medication use.        Past Medical History:   Diagnosis Date    Anemia     Arthritis     Bleeding ulcer 07/2016    Chronic pain     DDD (degenerative disc  disease), cervical     DDD (degenerative disc disease), lumbar     Depression     Disc degeneration, lumbosacral     Diverticulitis     Encounter for blood transfusion     Hypertension     IBS (irritable bowel syndrome)     Neuropathy of both feet     Seizures     none  since 2017    Umbilical hernia 2020       Past Surgical History:   Procedure Laterality Date    BACK SURGERY      BREAST SURGERY Right     lumpectomy    CAUDAL EPIDURAL STEROID INJECTION N/A 1/28/2022    Procedure: Injection-steroid-epidural-caudal;  Surgeon: Luzmaria Marcelino MD;  Location: [facility] OR;  Service: Pain Management;  Laterality: N/A;    COLONOSCOPY N/A 1/14/2022    Procedure: COLONOSCOPY;  Surgeon: Abby Landers MD;  Location: [facility];  Service: Endoscopy;  Laterality: N/A;    ENDOSCOPIC ULTRASOUND OF UPPER GASTROINTESTINAL TRACT N/A 7/21/2020    Procedure: ULTRASOUND, UPPER GI TRACT, ENDOSCOPIC;  Surgeon: Marcio Nguyễn III, MD;  Location: [facility] ENDO;  Service: Endoscopy;  Laterality: N/A;    ESOPHAGOGASTRODUODENOSCOPY N/A 1/14/2022    Procedure: EGD (ESOPHAGOGASTRODUODENOSCOPY);  Surgeon: Abby Landers MD;  Location: [facility];  Service: Endoscopy;  Laterality: N/A;    ESOPHAGOGASTRODUODENOSCOPY N/A 6/10/2022    Procedure: EGD (ESOPHAGOGASTRODUODENOSCOPY);  Surgeon: Abby Landers MD;  Location: [facility];  Service: Endoscopy;  Laterality: N/A;    EYE SURGERY      cataract    HYSTERECTOMY      INTRAMEDULLARY RODDING OF TROCHANTER OF FEMUR Left 12/11/2018    Procedure: INSERTION, INTRAMEDULLARY SANTOS, FEMUR, TROCHANTER;  Surgeon: Eulalio De La Cruz MD;  Location: [facility];  Service: Orthopedics;  Laterality: Left;    SPINAL CORD STIMULATOR IMPLANT  09/18/2013    and removal    SPINE SURGERY  2006    lumbar L2-S1 decompression.    SPINE SURGERY      cervical decompression    TONSILLECTOMY         Social History     Tobacco Use    Smoking status: Never Smoker    Smokeless tobacco: Never Used  "  Substance Use Topics    Alcohol use: No    Drug use: No       Family History   Problem Relation Age of Onset    Diabetes Mother     Hypertension Mother     Irritable bowel syndrome Mother     Diabetes Father         insulin dependent    Hypertension Father     Heart disease Father     Coronary artery disease Father     Depression Father     Diabetes Sister     Diabetes Brother     COPD Brother        Review of Systems  General ROS: negative for - chills, fever or weight loss  Psychological ROS: negative for - hallucination, depression or suicidal ideation  Ophthalmic ROS: negative for - blurry vision, photophobia or eye pain  ENT ROS: negative for - epistaxis, sore throat or rhinorrhea  Respiratory ROS: no cough, shortness of breath, or wheezing  Cardiovascular ROS: no chest pain or dyspnea on exertion  Gastrointestinal ROS: + abdominal pain and constipation.  Genito-Urinary ROS: no dysuria, trouble voiding, or hematuria  Musculoskeletal ROS: negative for - arthralgia, myalgia, weakness  Neurological ROS: no syncope or seizures; no ataxia  Dermatological ROS: negative for pruritis, rash and jaundice    Physical Examination  /61 (BP Location: Right arm, Patient Position: Lying)   Pulse 109   Temp 98.4 °F (36.9 °C) (Oral)   Resp (!) 28   Ht 5' 6" (1.676 m)   Wt 79.8 kg (175 lb 14.8 oz)   SpO2 96%   Breastfeeding No   BMI 28.40 kg/m²   General appearance: alert, cooperative, no distress, frail/elderly/ill-appearing  HENT: Normocephalic, atraumatic, neck symmetrical, no nasal discharge   Eyes: conjunctivae/corneas clear, PERRL, EOM's intact, sclera anicteric  Lungs: clear to auscultation bilaterally, no dullness to percussion bilaterally, symmetric expansion, breathing unlabored  Heart: regular rate and rhythm without rub; no displacement of the PMI   Abdomen: soft, with TTP in lower quadrants.  No peritoneal signs.  Hypoactive bowel sounds.  Extremities: extremities symmetric; no clubbing, " cyanosis, or edema  Integument: Skin pale, normal texture, turgor normal; no rashes; hair distrubution normal, no jaundice  Neurologic: Alert and oriented X 3, no focal sensory or motor neurologic deficits  Psychiatric: no pressured speech; normal affect; no evidence of impaired cognition, no anxiety/depression     Labs:  Lab Results   Component Value Date    WBC 5.98 06/15/2022    HGB 11.2 (L) 06/15/2022    HCT 35.9 (L) 06/15/2022    MCV 95 06/15/2022     06/15/2022         CMP  Sodium   Date Value Ref Range Status   06/15/2022 139 136 - 145 mmol/L Final     Potassium   Date Value Ref Range Status   06/15/2022 4.7 3.5 - 5.1 mmol/L Final     Chloride   Date Value Ref Range Status   06/15/2022 110 95 - 110 mmol/L Final     CO2   Date Value Ref Range Status   06/15/2022 16 (L) 23 - 29 mmol/L Final     Glucose   Date Value Ref Range Status   06/15/2022 126 (H) 70 - 110 mg/dL Final     BUN   Date Value Ref Range Status   06/15/2022 28 (H) 8 - 23 mg/dL Final     Creatinine   Date Value Ref Range Status   06/15/2022 1.3 0.5 - 1.4 mg/dL Final     Calcium   Date Value Ref Range Status   06/15/2022 8.6 (L) 8.7 - 10.5 mg/dL Final     Total Protein   Date Value Ref Range Status   06/15/2022 5.5 (L) 6.0 - 8.4 g/dL Final     Albumin   Date Value Ref Range Status   06/15/2022 2.9 (L) 3.5 - 5.2 g/dL Final     Total Bilirubin   Date Value Ref Range Status   06/15/2022 0.5 0.1 - 1.0 mg/dL Final     Comment:     For infants and newborns, interpretation of results should be based  on gestational age, weight and in agreement with clinical  observations.    Premature Infant recommended reference ranges:  Up to 24 hours.............<8.0 mg/dL  Up to 48 hours............<12.0 mg/dL  3-5 days..................<15.0 mg/dL  6-29 days.................<15.0 mg/dL       Alkaline Phosphatase   Date Value Ref Range Status   06/15/2022 87 55 - 135 U/L Final     AST   Date Value Ref Range Status   06/15/2022 37 10 - 40 U/L Final     ALT    Date Value Ref Range Status   06/15/2022 19 10 - 44 U/L Final     Anion Gap   Date Value Ref Range Status   06/15/2022 13 8 - 16 mmol/L Final     eGFR if    Date Value Ref Range Status   06/15/2022 47 (A) >60 mL/min/1.73 m^2 Final     eGFR if non    Date Value Ref Range Status   06/15/2022 41 (A) >60 mL/min/1.73 m^2 Final     Comment:     Calculation used to obtain the estimated glomerular filtration  rate (eGFR) is the CKD-EPI equation.            Imaging:  CT scan was independently visualized and reviewed by me and showed findings as above.    I have personally reviewed these images    Case discussed with multiple providers as above.    Assessment:   1.  Obstipation  2.  Stool impaction  3.  Abdominal pain  4.  Multiple medical problems    Plan:  1.  Lactate improved.  Continue resuscitation and replete electrolytes per primary team  2.  Minimize/avoid narcotics  3.  Soap suds enemas to attempt disimpaction  4.  Miralax PO BID  5.  Mineral oil PO/NJ for above  6.  If no relief with above, recommend gastrograffin enema  7.  Can consider relistor at some point.  8.  No indication for GI endoscopy at this time.  Will follow  9.  Communication will be sent to the referring provider regarding my assessment and plan on this patient via EPIC.      Jose Howe MD  [facility]  [address], [address]  [place] [address]  Office: [phone]  Fax: [phone]

## 2022-06-15 NOTE — SIGNIFICANT EVENT
Latest Reference Range & Units 06/15/22 03:48   POC PH 7.35 - 7.45  7.318 (L)   POC PCO2 35 - 45 mmHg 38.8   POC PO2 80 - 100 mmHg 65 (L)   POC BE -2 to 2 mmol/L -6   POC HCO3 24 - 28 mmol/L 19.9 (L)   POC SATURATED O2 95 - 100 % 91 (L)   POC TCO2 23 - 27 mmol/L 21 (L)   (L): Data is abnormally low

## 2022-06-15 NOTE — CONSULTS
Pharmacokinetic Initial Assessment: IV Vancomycin    Assessment/Plan:    Initiate intravenous vancomycin with loading dose of 2000 mg once with subsequent doses when random concentrations are less than 20 mcg/mL  Desired empiric serum trough concentration is 15 to 20 mcg/mL  Draw vancomycin random level on 06/15/22 at 1800.  Pharmacy will continue to follow and monitor vancomycin.      Please contact pharmacy at extension [phone] with any questions regarding this assessment.     Thank you for the consult,   Yoel Martinen       Patient brief summary:  Froilan Ray is a 76 y.o. female initiated on antimicrobial therapy with IV Vancomycin for treatment of suspected bacteremia    Drug Allergies:   Review of patient's allergies indicates:  No Known Allergies    Actual Body Weight:   75.3kg    Renal Function:   Estimated Creatinine Clearance: 26.9 mL/min (A) (based on SCr of 1.9 mg/dL (H)).,     CBC (last 72 hours):  Recent Labs   Lab Result Units 06/14/22  2344   WBC K/uL 10.50   Hemoglobin g/dL 11.3*   Hematocrit % 35.7*   Platelets K/uL 238   Gran % % 87.6*   Lymph % % 4.4*   Mono % % 7.0   Eosinophil % % 0.3   Basophil % % 0.2   Differential Method  Automated       Metabolic Panel (last 72 hours):  Recent Labs   Lab Result Units 06/14/22  2345 06/15/22  0215   Sodium mmol/L 142  --    Potassium mmol/L 4.9  --    Chloride mmol/L 111*  --    CO2 mmol/L 18*  --    Glucose mg/dL 107  --    Glucose, UA   --  Negative   BUN mg/dL 31*  --    Creatinine mg/dL 1.9*  --    Albumin g/dL 3.4*  --    Total Bilirubin mg/dL 0.4  --    Alkaline Phosphatase U/L 100  --    AST U/L 35  --    ALT U/L 18  --        Drug levels (last 3 results):  No results for input(s): VANCOMYCINRA, VANCORANDOM, VANCOMYCINPE, VANCOPEAK, VANCOMYCINTR, VANCOTROUGH in the last 72 hours.    Microbiologic Results:  Microbiology Results (last 7 days)       Procedure Component Value Units Date/Time    Blood culture x two cultures. Draw prior to antibiotics.  [[id number]] Collected: 06/14/22 2357    Order Status: Sent Specimen: Blood Updated: 06/14/22 2358    Blood culture x two cultures. Draw prior to antibiotics. [[id number]] Collected: 06/14/22 2358    Order Status: Sent Specimen: Blood from Peripheral, Right Updated: 06/14/22 2358

## 2022-06-15 NOTE — H&P
[facility]  [facility]  History & Physical    Patient Name: Froilan Ray  MRN: [id number]  Patient Class: IP- Inpatient  Admission Date: 6/14/2022  Attending Physician: Belkis Mcgee MD   Primary Care Provider: Primary Doctor No         Patient information was obtained from past medical records and ER records.     Subjective:     Principal Problem:Encephalopathy, metabolic    Chief Complaint:   Chief Complaint   Patient presents with    Fall    Vomiting        HPI: Froilan Ray is a 76-year-old female who presents emergency room for evaluation of nausea, vomiting, abdominal pain, and altered mental status.  EMS reported to ER staff that upon their arrival patient's blood pressure was 74 systolic.  At the time of my exam patient was altered.  No family available at bedside.  All information was obtained from epic medical records as well as ER physician.  Previous medical history includes left hip fracture, drug induced constipation, hypertension, iron deficiency anemia, and seizure disorder.  ER workup:  CBC with mild anemia.  CMP with BUN of 31 creatinine 1.9 with bicarb of 18. Initial lactic acid was 1.4.  Second lactic acid was 3.6.  ABG with pH of 7.3, PO2 of 65, bicarb 19, pCO2 of 38. CT of the head was negative for any acute findings.  CT abdomen and pelvis demonstrates a colonic obstruction secondary to constipation of the rectosigmoid junction.  Patient was started on septic protocol.  Patient was given IV fluids.  Patient started on  vancomycin and Zosyn.  Patient admitted to Hospital Medicine for observation management.  Patient be placed in ICU.  Will consult Neurology as well as GI.      Past Medical History:   Diagnosis Date    Anemia     Arthritis     Bleeding ulcer 07/2016    Chronic pain     DDD (degenerative disc disease), cervical     DDD (degenerative disc disease), lumbar     Depression     Disc degeneration, lumbosacral     Diverticulitis     Encounter  for blood transfusion     Hypertension     IBS (irritable bowel syndrome)     Neuropathy of both feet     Seizures     none  since 2017    Umbilical hernia 2020       Past Surgical History:   Procedure Laterality Date    BACK SURGERY      BREAST SURGERY Right     lumpectomy    CAUDAL EPIDURAL STEROID INJECTION N/A 1/28/2022    Procedure: Injection-steroid-epidural-caudal;  Surgeon: Luzmaria Marcelino MD;  Location: [facility] OR;  Service: Pain Management;  Laterality: N/A;    COLONOSCOPY N/A 1/14/2022    Procedure: COLONOSCOPY;  Surgeon: Abby Landers MD;  Location: [facility] ENDO;  Service: Endoscopy;  Laterality: N/A;    ENDOSCOPIC ULTRASOUND OF UPPER GASTROINTESTINAL TRACT N/A 7/21/2020    Procedure: ULTRASOUND, UPPER GI TRACT, ENDOSCOPIC;  Surgeon: Marcio Nguyễn III, MD;  Location: [facility] ENDO;  Service: Endoscopy;  Laterality: N/A;    ESOPHAGOGASTRODUODENOSCOPY N/A 1/14/2022    Procedure: EGD (ESOPHAGOGASTRODUODENOSCOPY);  Surgeon: Abby Landers MD;  Location: [facility] ENDO;  Service: Endoscopy;  Laterality: N/A;    ESOPHAGOGASTRODUODENOSCOPY N/A 6/10/2022    Procedure: EGD (ESOPHAGOGASTRODUODENOSCOPY);  Surgeon: Abby Landers MD;  Location: [facility] ENDO;  Service: Endoscopy;  Laterality: N/A;    EYE SURGERY      cataract    HYSTERECTOMY      INTRAMEDULLARY RODDING OF TROCHANTER OF FEMUR Left 12/11/2018    Procedure: INSERTION, INTRAMEDULLARY SANTOS, FEMUR, TROCHANTER;  Surgeon: Eulalio De La Cruz MD;  Location: [facility] OR;  Service: Orthopedics;  Laterality: Left;    SPINAL CORD STIMULATOR IMPLANT  09/18/2013    and removal    SPINE SURGERY  2006    lumbar L2-S1 decompression.    SPINE SURGERY      cervical decompression    TONSILLECTOMY         Review of patient's allergies indicates:  No Known Allergies    No current facility-administered medications on file prior to encounter.     Current Outpatient Medications on File Prior to Encounter   Medication Sig    acetaminophen (TYLENOL) 325 MG tablet  Take 650 mg by mouth once.    amlodipine-benazepril 5-20 mg (LOTREL) 5-20 mg per capsule Take 1 capsule by mouth once daily.    ascorbic acid, vitamin C, (VITAMIN C) 250 MG tablet Take 1 tablet (250 mg total) by mouth once daily.    cyanocobalamin (VITAMIN B-12) 100 MCG tablet Take 1 tablet (100 mcg total) by mouth once daily.    cyclobenzaprine (FLEXERIL) 10 MG tablet TAKE 1 TABLET(10 MG) BY MOUTH THREE TIMES DAILY AS NEEDED FOR MUSCLE SPASMS    EScitalopram oxalate (LEXAPRO) 20 MG tablet TAKE 1 TABLET(20 MG) BY MOUTH EVERY DAY    ferrous sulfate (FEOSOL) Tab tablet Take 1 tablet (1 each total) by mouth daily with breakfast. (Patient not taking: Reported on 6/10/2022)    furosemide (LASIX) 40 MG tablet Take 40 mg by mouth daily as needed.    omeprazole (PRILOSEC) 40 MG capsule Take 1 capsule (40 mg total) by mouth 2 (two) times daily before meals.    oxyCODONE-acetaminophen (PERCOCET)  mg per tablet Take 1 tablet by mouth every 6 (six) hours as needed for Pain.    oxyCODONE-acetaminophen (PERCOCET)  mg per tablet Take one tablet by mouth every 4-6 hours as needed for pain    oxyCODONE-acetaminophen (PERCOCET)  mg per tablet Take one tablet by mouth every 4 to 6 hours as needed for breakthrough pain    oxyCODONE-acetaminophen (PERCOCET)  mg per tablet Take one tablet by mouth every 4 to 6 hours as needed for breakthrough pain    pregabalin (LYRICA) 200 MG Cap TAKE 1 CAPSULE(200 MG) BY MOUTH THREE TIMES DAILY    [DISCONTINUED] pantoprazole (PROTONIX) 40 MG tablet Take 1 tablet (40 mg total) by mouth 2 (two) times daily.     Family History       Problem Relation (Age of Onset)    COPD Brother    Coronary artery disease Father    Depression Father    Diabetes Mother, Father, Sister, Brother    Heart disease Father    Hypertension Mother, Father    Irritable bowel syndrome Mother          Tobacco Use    Smoking status: Never Smoker    Smokeless tobacco: Never Used   Substance and  Sexual Activity    Alcohol use: No    Drug use: No    Sexual activity: Not Currently     Review of Systems   Unable to perform ROS: Mental status change   Cardiovascular:  Chest pain: Head:.   Objective:     Vital Signs (Most Recent):  Temp: 97.5 °F (36.4 °C) (06/15/22 0245)  Pulse: 91 (06/15/22 0245)  Resp: 14 (06/15/22 0245)  BP: (!) 94/50 (06/15/22 0245)  SpO2: 100 % (06/15/22 0245)   Vital Signs (24h Range):  Temp:  [97.5 °F (36.4 °C)] 97.5 °F (36.4 °C)  Pulse:  [83-91] 91  Resp:  [14] 14  SpO2:  [90 %-100 %] 100 %  BP: ()/(50-74) 94/50     Weight: 75.3 kg (166 lb)  Body mass index is 26.79 kg/m².    Physical Exam  Vitals and nursing note reviewed.   Constitutional:       General: She is not in acute distress.     Appearance: She is well-developed. She is ill-appearing. She is not diaphoretic.   HENT:      Head: Normocephalic.      Mouth/Throat:      Mouth: Mucous membranes are dry.   Eyes:      General: No scleral icterus.     Conjunctiva/sclera: Conjunctivae normal.      Pupils: Pupils are equal, round, and reactive to light.   Neck:      Vascular: No JVD.   Cardiovascular:      Rate and Rhythm: Regular rhythm. Tachycardia present.      Heart sounds: Normal heart sounds. No murmur heard.    No friction rub. No gallop.   Pulmonary:      Effort: Pulmonary effort is normal. No respiratory distress.      Breath sounds: Normal breath sounds. No wheezing or rales.   Abdominal:      General: Bowel sounds are normal. There is no distension.      Palpations: Abdomen is soft.      Tenderness: There is no abdominal tenderness. There is no guarding or rebound.   Musculoskeletal:         General: No tenderness. Normal range of motion.      Cervical back: Normal range of motion and neck supple.   Lymphadenopathy:      Cervical: No cervical adenopathy.   Skin:     General: Skin is warm and dry.      Capillary Refill: Capillary refill takes less than 2 seconds.      Coloration: Skin is pale.      Findings: No  erythema or rash.   Neurological:      Mental Status: She is disoriented.      Comments: Confused to verbal questioning         CRANIAL NERVES     CN III, IV, VI   Pupils are equal, round, and reactive to light.     Significant Labs: All pertinent labs within the past 24 hours have been reviewed.  Blood Culture: No results for input(s): LABBLOO in the last 48 hours.  CBC:   Recent Labs   Lab 06/14/22  2344   WBC 10.50   HGB 11.3*   HCT 35.7*        CMP:   Recent Labs   Lab 06/14/22  2345      K 4.9   *   CO2 18*      BUN 31*   CREATININE 1.9*   CALCIUM 9.2   PROT 6.1   ALBUMIN 3.4*   BILITOT 0.4   ALKPHOS 100   AST 35   ALT 18   ANIONGAP 13   EGFRNONAA 26*     Cardiac Markers: No results for input(s): CKMB, MYOGLOBIN, BNP, TROPISTAT in the last 48 hours.  Lactic Acid:   Recent Labs   Lab 06/14/22  2357 06/15/22  0319   LACTATE 1.4 3.6*     Urine Culture: No results for input(s): LABURIN in the last 48 hours.  Urine Studies:   Recent Labs   Lab 06/15/22  0215   COLORU Yellow   APPEARANCEUA Clear   PHUR 6.0   SPECGRAV 1.020   PROTEINUA Negative   GLUCUA Negative   KETONESU Negative   BILIRUBINUA Negative   OCCULTUA 1+*   NITRITE Negative   UROBILINOGEN Negative   LEUKOCYTESUR Negative   RBCUA 3   WBCUA 1   BACTERIA None   SQUAMEPITHEL 1       Significant Imaging: I have reviewed all pertinent imaging results/findings within the past 24 hours.    CT abdomen pelvis:    IMPRESSION:  1. Colonic obstruction near the rectosigmoid junction secondary to constipation.  2. Gastric hiatal hernia.  3. Nonobstructive left-sided nephrolithiasis. Negative for ureteral calculus/obstruction.  4. Anemia, as described above.  5. Additional nonacute findings as described above    CT head:    IMPRESSION:  1. Nonspecific white matter changes likely related to nonacute microvascular etiology. No acute  intracranial findings.  2. Age related brain atrophy and vascular calcifications.  3. Additional nonacute findings  "as described above.        Assessment/Plan:     * Encephalopathy, metabolic  Acute problem  Patient has acute metabolic encephalopathy that is secondary to Sepsis/Infective. Patient's current mental status is Confused. Patient's baseline mental status is. awake and alert; oriented to person, place, and time Evaluation and for underlying cause(s) is underway and inclusive of Blood Chemistries, Consult to Neurology, Neuro-Imaging (MRI/CT) and Toxic metabolite eval . Will monitor neuro checks carefully, avoid narcotics and benzos that will exacerbate agitation, and use PRN medications for controls of behavior for self harm.        PEDRO (acute kidney injury)  Acute problem  Patient with acute kidney injury likely d/t IVVD/Dehydration  caused by dehydration. PEDRO is currently worsening. Labs reviewed- Renal function/electrolytes with Estimated Creatinine Clearance: 26.9 mL/min (A) (based on SCr of 1.9 mg/dL (H)). according to latest data. Monitor urine output and serial BMP and adjust therapy as needed. Avoid nephrotoxins and renally dose meds for GFR listed above.       Sepsis  This patient does have evidence of infective focus  My overall impression is sepsis. Vital signs were reviewed and noted in progress note.  Antibiotics given-   Antibiotics (From admission, onward)            Start     Stop Route Frequency Ordered    06/15/22 0930  piperacillin-tazobactam 4.5 g in dextrose 5 % 100 mL IVPB (ready to mix system)         -- IV Every 8 hours (non-standard times) 06/15/22 0402    06/15/22 0502  vancomycin - pharmacy to dose  (vancomycin IVPB)        "And" Linked Group Details    -- IV pharmacy to manage frequency 06/15/22 0402        Cultures were taken-   Microbiology Results (last 7 days)     Procedure Component Value Units Date/Time    Blood culture x two cultures. Draw prior to antibiotics. [[id number]] Collected: 06/14/22 2357    Order Status: Sent Specimen: Blood Updated: 06/14/22 2358    Blood culture x two " cultures. Draw prior to antibiotics. [[id number]] Collected: 06/14/22 2358    Order Status: Sent Specimen: Blood from Peripheral, Right Updated: 06/14/22 2358        Latest lactate reviewed, they are-  Recent Labs   Lab 06/14/22  2357 06/15/22  0319   LACTATE 1.4 3.6*       Organ dysfunction indicated by Acute kidney injury  Source- unknown    Source control Achieved by- vancomycin Zosyn      Iron deficiency anemia  Chronic problem  Received iron infusion outpatient yesterday  Monitor CBC closely      HTN (hypertension)  Chronic  Chronic, controlled.  Latest blood pressure and vitals reviewed-   Temp:  [97.5 °F (36.4 °C)]   Pulse:  [83-91]   Resp:  [14]   BP: ()/(50-74)   SpO2:  [90 %-100 %] .   Home meds for hypertension were reviewed and noted below.   Hypertension Medications             amlodipine-benazepril 5-20 mg (LOTREL) 5-20 mg per capsule Take 1 capsule by mouth once daily.    furosemide (LASIX) 40 MG tablet Take 40 mg by mouth daily as needed.          While in the hospital, will manage blood pressure as follows; Continue home antihypertensive regimen    Will utilize p.r.n. blood pressure medication only if patient's blood pressure greater than  180/110 and she develops symptoms such as worsening chest pain or shortness of breath.        VTE Risk Mitigation (From admission, onward)         Ordered     IP VTE HIGH RISK PATIENT  Once         06/15/22 0401     Place sequential compression device  Until discontinued         06/15/22 0401     Place CARI hose  Until discontinued         06/15/22 0401               Critical care time spent on the evaluation and treatment of severe organ dysfunction, review of pertinent labs and imaging studies, discussions with consulting providers and discussions with patient/family 45 minutes      Chad Koch NP  Department of Hospital Medicine   [facility] - Emergency Dept

## 2022-06-15 NOTE — CONSULTS
[facility]  Neurology  Consult Note        PATIENT NAME: Froilan Ray  MRN: [id number]  CSN: [id number]      TODAY'S DATE: 06/15/2022  ADMIT DATE: 6/14/2022                            CONSULTING PROVIDER: Moe Schuster MD  CONSULT REQUESTED BY: Tamara Vargas MD      Reason for consult: Encephalopathy       History obtained from chart review and the patient.    HPI per EMR: Froilan Ray is a 76 y.o. female with a history of presents emergency room for evaluation of nausea, vomiting, abdominal pain, and altered mental status.  EMS reported to ER staff that upon their arrival patient's blood pressure was 74 systolic.  At the time of my exam patient was altered.  No family available at bedside.  All information was obtained from [facility] medical records as well as ER physician.  Previous medical history includes left hip fracture, drug induced constipation, hypertension, iron deficiency anemia, and seizure disorder.  ER workup:  CBC with mild anemia.  CMP with BUN of 31 creatinine 1.9 with bicarb of 18. Initial lactic acid was 1.4.  Second lactic acid was 3.6.  ABG with pH of 7.3, PO2 of 65, bicarb 19, pCO2 of 38. CT of the head was negative for any acute findings.  CT abdomen and pelvis demonstrates a colonic obstruction secondary to constipation of the rectosigmoid junction.  Patient was started on septic protocol.  Patient was given IV fluids.  Patient started on  vancomycin and Zosyn.  Patient admitted to Hospital Medicine for observation management.  Patient be placed in ICU.       Neurology consult: Patient was seen and examined by me in the ICU. She is drowsy however wakes up to stimulus and able to answer to questions however she is slow to respond. She states that she had abdominal pain for last couple days for which she presented to the hospital.     She denies any headache, vision loss, speech deficits, unilateral weakness or sensory changes.     In the ER, she was  Hypotensive, tachycardic, afebrile, with acute abdomen- pain. Labs with white count of 10.5, PMN 87.6%, bands of 7%, H&H 11.3/35.7, platelet count 238. PEDRO, creatinine 1.9, normal LFTs  Lactic acid 4. She was admitted to ICU for sepsis and encephalopathy    PREVIOUS MEDICAL HISTORY:  Past Medical History:   Diagnosis Date    Anemia     Arthritis     Bleeding ulcer 07/2016    Chronic pain     DDD (degenerative disc disease), cervical     DDD (degenerative disc disease), lumbar     Depression     Disc degeneration, lumbosacral     Diverticulitis     Encounter for blood transfusion     Hypertension     IBS (irritable bowel syndrome)     Neuropathy of both feet     Seizures     none  since 2017    Umbilical hernia 2020     PREVIOUS SURGICAL HISTORY:  Past Surgical History:   Procedure Laterality Date    BACK SURGERY      BREAST SURGERY Right     lumpectomy    CAUDAL EPIDURAL STEROID INJECTION N/A 1/28/2022    Procedure: Injection-steroid-epidural-caudal;  Surgeon: Luzmaria Marcelino MD;  Location: [facility];  Service: Pain Management;  Laterality: N/A;    COLONOSCOPY N/A 1/14/2022    Procedure: COLONOSCOPY;  Surgeon: Abby Landers MD;  Location: [facility];  Service: Endoscopy;  Laterality: N/A;    ENDOSCOPIC ULTRASOUND OF UPPER GASTROINTESTINAL TRACT N/A 7/21/2020    Procedure: ULTRASOUND, UPPER GI TRACT, ENDOSCOPIC;  Surgeon: Marcio Nguyễn III, MD;  Location: [facility] [place] [place];  Service: Endoscopy;  Laterality: N/A;    ESOPHAGOGASTRODUODENOSCOPY N/A 1/14/2022    Procedure: EGD (ESOPHAGOGASTRODUODENOSCOPY);  Surgeon: Abby Landers MD;  Location: [facility];  Service: Endoscopy;  Laterality: N/A;    ESOPHAGOGASTRODUODENOSCOPY N/A 6/10/2022    Procedure: EGD (ESOPHAGOGASTRODUODENOSCOPY);  Surgeon: Abby Landers MD;  Location: [facility];  Service: Endoscopy;  Laterality: N/A;    EYE SURGERY      cataract    HYSTERECTOMY      INTRAMEDULLARY RODDING OF TROCHANTER OF FEMUR Left 12/11/2018     Procedure: INSERTION, INTRAMEDULLARY SANTOS, FEMUR, TROCHANTER;  Surgeon: Eulalio De La Cruz MD;  Location: [facility];  Service: Orthopedics;  Laterality: Left;    SPINAL CORD STIMULATOR IMPLANT  09/18/2013    and removal    SPINE SURGERY  2006    lumbar L2-S1 decompression.    SPINE SURGERY      cervical decompression    TONSILLECTOMY       FAMILY MEDICAL HISTORY:  Family History   Problem Relation Age of Onset    Diabetes Mother     Hypertension Mother     Irritable bowel syndrome Mother     Diabetes Father         insulin dependent    Hypertension Father     Heart disease Father     Coronary artery disease Father     Depression Father     Diabetes Sister     Diabetes Brother     COPD Brother      SOCIAL HISTORY:  Social History     Tobacco Use    Smoking status: Never Smoker    Smokeless tobacco: Never Used   Substance Use Topics    Alcohol use: No    Drug use: No     ALLERGIES:  Review of patient's allergies indicates:  No Known Allergies  HOME MEDICATIONS:  Prior to Admission medications    Medication Sig Start Date End Date Taking? Authorizing Provider   acetaminophen (TYLENOL) 325 MG tablet Take 650 mg by mouth once.    Historical Provider   amlodipine-benazepril 5-20 mg (LOTREL) 5-20 mg per capsule Take 1 capsule by mouth once daily. 10/20/21   Otoniel Duggan MD   ascorbic acid, vitamin C, (VITAMIN C) 250 MG tablet Take 1 tablet (250 mg total) by mouth once daily. 1/15/22   Sammie Tamez NP   cyanocobalamin (VITAMIN B-12) 100 MCG tablet Take 1 tablet (100 mcg total) by mouth once daily. 1/15/22   Sammie Tamez NP   cyclobenzaprine (FLEXERIL) 10 MG tablet TAKE 1 TABLET(10 MG) BY MOUTH THREE TIMES DAILY AS NEEDED FOR MUSCLE SPASMS 5/9/22   Luzmaria Marcelino MD   EScitalopram oxalate (LEXAPRO) 20 MG tablet TAKE 1 TABLET(20 MG) BY MOUTH EVERY DAY 7/6/21   Otoniel Duggan MD   ferrous sulfate (FEOSOL) Tab tablet Take 1 tablet (1 each total) by mouth daily with breakfast.  Patient not  taking: Reported on 6/10/2022 1/15/22   Sammie Tamez NP   furosemide (LASIX) 40 MG tablet Take 40 mg by mouth daily as needed.    Historical Provider   omeprazole (PRILOSEC) 40 MG capsule Take 1 capsule (40 mg total) by mouth 2 (two) times daily before meals. 6/10/22 6/10/23  Abby Landers MD   oxyCODONE-acetaminophen (PERCOCET)  mg per tablet Take 1 tablet by mouth every 6 (six) hours as needed for Pain. 1/14/22   Luzmaria Marcelino MD   oxyCODONE-acetaminophen (PERCOCET)  mg per tablet Take one tablet by mouth every 4-6 hours as needed for pain 4/11/22   Luzmaria Marcelino MD   oxyCODONE-acetaminophen (PERCOCET)  mg per tablet Take one tablet by mouth every 4 to 6 hours as needed for breakthrough pain 5/9/22   Luzmaria Marcelino MD   oxyCODONE-acetaminophen (PERCOCET)  mg per tablet Take one tablet by mouth every 4 to 6 hours as needed for breakthrough pain 6/6/22   Luzmaria Marcelino MD   pregabalin (LYRICA) 200 MG Cap TAKE 1 CAPSULE(200 MG) BY MOUTH THREE TIMES DAILY 5/9/22   Luzmaria Marcelino MD   pantoprazole (PROTONIX) 40 MG tablet Take 1 tablet (40 mg total) by mouth 2 (two) times daily. 1/15/22 6/10/22  Sammie Tamez NP     CURRENT SCHEDULED MEDICATIONS:   [START ON 6/16/2022] fluconazole (DIFLUCAN) IV (PEDS and ADULTS)  200 mg Intravenous Q24H    fluconazole (DIFLUCAN) IV (PEDS and ADULTS)  400 mg Intravenous Once    lactated ringers  1,000 mL Intravenous ED 1 Time    mineral oil  1 enema Rectal Once    piperacillin-tazobactam (ZOSYN) IVPB  4.5 g Intravenous Q8H    polyethylene glycol  17 g Oral BID    sodium phosphates  1 enema Rectal Once     CURRENT INFUSIONS:    CURRENT PRN MEDICATIONS:  acetaminophen, acetaminophen, albuterol-ipratropium, aluminum-magnesium hydroxide-simethicone, dextrose 10%, dextrose 10%, glucagon (human recombinant), glucose, glucose, magnesium oxide, magnesium oxide, naloxone, ondansetron, potassium bicarbonate, potassium bicarbonate, potassium  bicarbonate, potassium, sodium phosphates, potassium, sodium phosphates, potassium, sodium phosphates, simethicone, sodium chloride 0.9%, Pharmacy to dose Vancomycin consult **AND** vancomycin - pharmacy to dose    REVIEW OF SYSTEMS:  Please refer to the HPI for all pertinent positive and negative findings. A comprehensive review of all other systems was negative.       PHYSICAL EXAM:  Patient Vitals for the past 24 hrs:   BP Temp Temp src Pulse Resp SpO2 Weight   06/15/22 0845 (!) 125/57 -- -- 102 14 100 % --   06/15/22 0630 (!) 109/53 -- -- 100 -- 98 % --   06/15/22 0615 (!) 114/57 98.6 °F (37 °C) Axillary 98 -- 100 % --   06/15/22 0600 (!) 114/57 -- -- 98 15 100 % --   06/15/22 0545 (!) 114/56 -- -- 97 -- 100 % --   06/15/22 0530 (!) 123/56 -- -- 97 -- 100 % --   06/15/22 0515 121/60 -- -- 97 -- 100 % --   06/15/22 0445 (!) 121/58 -- -- 97 -- 100 % --   06/15/22 0430 111/61 -- -- 97 -- 100 % --   06/15/22 0415 (!) 129/57 -- -- 97 -- 100 % --   06/15/22 0400 (!) 146/66 -- -- 101 -- 100 % --   06/15/22 0348 (!) 105/54 -- -- 95 -- 96 % --   06/15/22 0345 -- -- -- 94 -- 99 % --   06/15/22 0330 (!) 108/55 -- -- 92 -- 100 % --   06/15/22 0315 (!) 125/91 -- -- 94 -- 100 % --   06/15/22 0300 (!) 100/55 -- -- 99 -- 100 % --   06/15/22 0245 (!) 94/50 97.5 °F (36.4 °C) Rectal 91 14 100 % --   06/15/22 0230 (!) 96/52 -- -- 90 -- 100 % --   06/15/22 0215 99/74 -- -- 88 -- 97 % --   06/15/22 0200 (!) 117/57 -- -- 88 14 100 % --   06/15/22 0145 119/65 -- -- 87 -- 100 % --   06/15/22 0130 (!) 120/55 -- -- 87 -- 99 % --   06/15/22 0115 (!) 122/58 -- -- 86 -- 98 % --   06/15/22 0100 (!) 107/56 -- -- 83 -- 98 % --   06/15/22 0045 (!) 106/56 -- -- 85 -- 97 % --   06/15/22 0000 (!) 104/58 -- -- 83 -- 97 % --   06/14/22 2350 (!) 99/52 -- -- 85 14 98 % 75.3 kg (166 lb)   06/14/22 2344 (!) 95/55 -- -- 85 -- (!) 90 % --       GENERAL APPEARANCE: Alert, well-developed, well-nourished female in no acute distress.  HEENT: Normocephalic and  atraumatic. PERRL. Oropharynx unremarkable.  PULM: Normal respiratory effort. No accessory muscle use.  CV: RRR.  ABDOMEN: Soft, nontender.  EXTREMITIES: No obvious signs of vascular compromise. Pulses present. No cyanosis, clubbing or edema.  SKIN: Clear; no rashes, lesions or skin breaks in exposed areas.    NEURO:  MENTAL STATUS: Patient awake and oriented to time, place, and person, recent/remote memory normal and speech fluent without paraphasic errors. She is slow to respond  Affect euthymic.    CRANIAL NERVES:  CN I: Not tested.  CN II: Fundoscopic exam deferred.  CN III, IV, VI: Pupils equal, round and reactive to light.  Extraocular movements full and intact.  CN V: Facial sensation normal.  CN VII: Facial asymmetry absent.  CN VIII: Hearing grossly normal and equal bilaterally.  No skew deviation or pathologic nystagmus.  CN IX, X: Palate elevates symmetrically. Speech/articulation is clear without dysarthria.  CN XI: Shoulder shrug and chin rotation equal with good strength.  CN XII: Tongue protrusion midline.    MOTOR:  Bulk normal. Tone normal and symmetric throughout.  Abnormal movements noted: Negative myoclonus in bilateral UE.  Tremor: none present.  Strength 5/5 throughout.    REFLEXES:  DTRs 2+ throughout.  Plantar response downgoing bilaterally.  SENSATION:grossly intact throughout.  COORDINATION: normal finger-to-nose.  STATION: not tested.  GAIT: not tested.      [facility]  Labs:  Recent Labs   Lab 06/14/22  2345 06/15/22  0319    139   K 4.9 4.7   * 110   CO2 18* 16*   BUN 31* 28*   CREATININE 1.9* 1.3    126*   CALCIUM 9.2 8.6*   PHOS  --  4.4   MG  --  2.1     Recent Labs   Lab 06/14/22  2344 06/15/22  0319   WBC 10.50 5.98   HGB 11.3* 11.2*   HCT 35.7* 35.9*    197     Recent Labs   Lab 06/14/22  2345 06/15/22  0319   ALBUMIN 3.4* 2.9*   PROT 6.1 5.5*   BILITOT 0.4 0.5   ALKPHOS 100 87   ALT 18 19   AST 35 37     Lab Results   Component Value Date    INR 1.0 09/27/2017      No results found for: CHOLTOT, TRIG, HDL, CHOLHDL, LDL  No results found for: HGBA1C  No results found for: PROTEINCSF, GLUCCSF, WBCCSF    Imaging:  I have reviewed and interpreted the pertinent imaging and lab results.      CT Abdomen Pelvis With Contrast  Narrative: EXAMINATION:  CT ABDOMEN PELVIS WITH CONTRAST    CLINICAL HISTORY:  Abdominal pain, acute, nonlocalized;    TECHNIQUE:  Low dose axial images, sagittal and coronal reformations were obtained from the lung bases to the pubic symphysis following the IV administration of 75 mL of Omnipaque 350    COMPARISON:  CT abdomen of Yanira 15, 2020 at 00:19    FINDINGS:  There is mild atelectasis at the left lung base.  The liver is of normal size and CT density without focal defect.  The gallbladder is distended without CT evidence of stone.  The pancreas is severely fatty atrophy without edema or mass.  The spleen is of normal size and CT density.    The adrenal glands are not enlarged.  The kidneys are of normal size and contrast enhancement.  A 3 mm left intrarenal stone is seen without hydronephrosis.  No masses are noted.  The abdominal aorta and inferior vena cava are of normal caliber.    There is a moderate size hiatal hernia.  Small bowel dilatation or air-fluid levels are not seen.  There is an umbilical hernia which contains some fluid and fat.  There is noted distention of the colon filled with fluid down to the sigmoid colon and rectum was is filled with stool.  This could represent a fecal impaction are constipation.  Free air in the abdomen is not seen.    The bladder is no empty with a Boyd catheter in place.  A uterus is not seen.  The patient has had prior ORIF of the left hip.  Impression: Fluid-filled distension of most of the colon with stool noted in the distal sigmoid colon and rectum may represent a fecal impaction/severe constipation.  Small-bowel obstruction is not seen.  Moderate size hiatal hernia.  Mild atelectasis at the left lung  base.  Prior hysterectomy.  Prior ORIF of the left hip.  Lumbar scoliosis and degenerative disc disease at every level.  Gallbladder distention is now seen although a stone or gallbladder wall thickening is not seen.  Small umbilical hernia containing fluid and fat.    Electronically signed by: Rick Berrios MD  Date:    06/15/2022  Time:    09:39  CT Abdomen Pelvis  Without Contrast  Narrative: EXAMINATION:  CT ABDOMEN PELVIS WITHOUT CONTRAST    CLINICAL HISTORY:  GI bleed;    TECHNIQUE:  Low dose axial images, sagittal and coronal reformations were obtained from the lung bases to the pubic symphysis.    COMPARISON:  CT abdomen and pelvis of November 15, 2020 at    FINDINGS:  The liver is of normal size contour and CT density without focal mass.  A single calcified granuloma is seen near the falciform ligament.  The gallbladder is of normal size without CT evidence of stone.  The pancreas is fatty atrophied without a focal mass or edema is seen.  The spleen is small and of normal CT density.  Calcified granuloma is noted in the left lower lobe.    The adrenal glands are not enlarged.  The kidneys are of normal size contour and CT density.  There is a 3 mm calcification in the lower pole calyx of the left kidney without hydronephrosis or mass.  The abdominal aorta and inferior vena cava are of normal caliber.    There is a moderate size hiatal hernia.  There is an umbilical hernia containing fat and no bowel loop.  Small bowel distention or air-fluid levels are not seen.  The colon is full of stool and dilated down to the descending colon.  There is also stool noted in the rectum.  This may represent constipation.  Free fluid or free air is not seen.    The bladder is of normal contour without mass or asymmetry.  A uterus is not seen.  The patient has had left hip ORIF without metal artifact noted.  Deformity of the right side of the pubic symphysis may be due to old fracture.    The patient is seen to have  lumbar scoliosis convex to the left and degenerative disc disease at every level of the lumbar spine.  Impression: Constipation.  Prior hysterectomy.  3 mm left intrarenal stone.  Prior granulomatous disease.  Moderate size hiatal hernia.    Lumbar scoliosis.  Degenerative disc disease at every level of the lumbar spine.  Prior ORIF left hip.    Electronically signed by: Rick Berrios MD  Date:    06/15/2022  Time:    08:00  X-Ray Chest AP Portable  Narrative: EXAMINATION:  XR CHEST AP PORTABLE    CLINICAL HISTORY:  Sepsis;    TECHNIQUE:  Single frontal view of the chest was performed.    COMPARISON:  Chest of August 22, 2020    FINDINGS:  The lungs are clear, with normal appearance of pulmonary vasculature and no pleural effusion or pneumothorax.    The cardiac silhouette is normal in size. The hilar and mediastinal contours are unremarkable.    Bones are intact.  Impression: No acute abnormality.    Electronically signed by: Rick Berrios MD  Date:    06/15/2022  Time:    07:47  CT Head Without Contrast  Narrative: EXAMINATION:  CT HEAD WITHOUT CONTRAST    CLINICAL HISTORY:  Mental status change, unknown cause;    TECHNIQUE:  Routine unenhanced axial images were obtained through the head.  Sagittal and coronal reformatted images were created.  The study is reviewed in bone and soft tissue windows.    COMPARISON:  Head CT dated 10/04/2019    FINDINGS:  Intracranial contents: There is no acute abnormality or detectable change in the appearance of the brain compared to the prior study.  There is generalized volume loss and nonspecific white matter change.  There is no intracranial hemorrhage, mass or obvious acute infarction.  The gray-white interface is preserved.  There is no midline shift.  There is no abnormal extra-axial fluid collection.  The basilar cisterns are open.  The cerebellar tonsils are normal position.  The patient has had prior left lens replacement surgery.  The orbits are otherwise grossly  normal.    Extracranial contents, calvarium, soft tissues: The calvarium is normal.  There are changes of hyperostosis frontalis interna.  The nasal septum is deviated to the left.  The paranasal sinuses and mastoid air cells are grossly clear.  There is degeneration at the TMJ bilaterally.  Impression: 1. There is no acute abnormality.  There is volume loss and nonspecific white matter change.  There is no acute hemorrhage, mass effect or obvious acute infarction.  It should be noted that MRI is more sensitive in the detection of subtle or acute nonhemorrhagic ischemic disease.  Note: Preliminary results were provided by Dr. Otoneil Silva ([facility]).  There is no significant discrepancy.    Electronically signed by: Mitul Cuellar MD  Date:    06/15/2022  Time:    06:29         ASSESSMENT & PLAN:    Encephalopathy    Plan:  · Etiology of encephalopathy is likely secondary to underlying systemic infection.   · CTH was done showed no acute abnormalities.   · Gen Surgery consulted and recommended no surgical intervention at this time. She is getting scheduled enemas.  · Mental status improving. If any worsening in mental status, will consider MRI brain and EEG.   · Neuro checks per unit protocol  · Delirium precautions.   · ID on board for infectious  work up and management.   · Will follow        Thank you kindly for including us in the care of this patient. Please do not hesitate to contact us with any questions.      Critical Care:  45 minutes of critical care time has been spent evaluating with the patient. Time includes chart review not limited to diagnostic imaging, labs, and vitals, patient assessment, discussion with family and nursing, current order evaluations, and new order entries.       Moe Schuster MD  Neurology/vascular Neurology  Date of Service: 06/15/2022  9:57 AM        Please note: This note was transcribed using voice recognition software. Because of this technology there are often uinintended  grammatical, spelling, and other transcription errors. Please disregard these errors.

## 2022-06-15 NOTE — ASSESSMENT & PLAN NOTE
Acute problem  Patient with acute kidney injury likely d/t IVVD/Dehydration  caused by dehydration. PEDRO is currently worsening. Labs reviewed- Renal function/electrolytes with Estimated Creatinine Clearance: 26.9 mL/min (A) (based on SCr of 1.9 mg/dL (H)). according to latest data. Monitor urine output and serial BMP and adjust therapy as needed. Avoid nephrotoxins and renally dose meds for GFR listed above.

## 2022-06-15 NOTE — ED NOTES
LOC: Patient name and date of birth verified. The patient is DROWSY, ORIENTED TO SELF.  APPEARANCE: Patient resting comfortably, patient is clean and well groomed, patient's clothing is properly fastened.  SKIN: The skin is warm and dry, PALE, patient has normal skin turgor and moist mucus membranes, skin intact, no breakdown or bruising noted.  MUSCULOSKELETAL: Patient moving all extremities well, no obvious swelling or deformities noted.   RESPIRATORY: Respirations are spontaneous, patient has a normal effort and rate, no accessory muscle use noted.  CARDIAC: Patient has a normal rate and rhythm, no periphreal edema noted, capillary refill < 3 seconds.  ABDOMEN: FIRM and non tender to palpation, no distention noted.   NEUROLOGIC: Eyes open spontaneously, behavior appropriate to situation, follows commands, facial expression symmetrical, bilateral hand grasp equal and even, purposeful motor response noted, normal sensation in all extremities when touched with a finger.

## 2022-06-15 NOTE — CONSULTS
[facility] Emergency Dept  General Surgery  Consult Note    Consults  Subjective:     Chief Complaint/Reason for Admission:  Obstipation, abdominal pain    History of Present Illness:  This is a 76-year-old female who presented with abdominal distension, abdominal pain, nausea.  She was reportedly confused in the ED.  She had a CT scan which was concerning for rectosigmoid obstruction.  I was consulted after she had increasing abdominal pain and concern of acute abdomen.  Currently still mildly confused.  Complaining of abdominal pain    No current facility-administered medications on file prior to encounter.     Current Outpatient Medications on File Prior to Encounter   Medication Sig    acetaminophen (TYLENOL) 325 MG tablet Take 650 mg by mouth once.    amlodipine-benazepril 5-20 mg (LOTREL) 5-20 mg per capsule Take 1 capsule by mouth once daily.    ascorbic acid, vitamin C, (VITAMIN C) 250 MG tablet Take 1 tablet (250 mg total) by mouth once daily.    cyanocobalamin (VITAMIN B-12) 100 MCG tablet Take 1 tablet (100 mcg total) by mouth once daily.    cyclobenzaprine (FLEXERIL) 10 MG tablet TAKE 1 TABLET(10 MG) BY MOUTH THREE TIMES DAILY AS NEEDED FOR MUSCLE SPASMS    EScitalopram oxalate (LEXAPRO) 20 MG tablet TAKE 1 TABLET(20 MG) BY MOUTH EVERY DAY    ferrous sulfate (FEOSOL) Tab tablet Take 1 tablet (1 each total) by mouth daily with breakfast. (Patient not taking: Reported on 6/10/2022)    furosemide (LASIX) 40 MG tablet Take 40 mg by mouth daily as needed.    omeprazole (PRILOSEC) 40 MG capsule Take 1 capsule (40 mg total) by mouth 2 (two) times daily before meals.    oxyCODONE-acetaminophen (PERCOCET)  mg per tablet Take 1 tablet by mouth every 6 (six) hours as needed for Pain.    oxyCODONE-acetaminophen (PERCOCET)  mg per tablet Take one tablet by mouth every 4-6 hours as needed for pain    oxyCODONE-acetaminophen (PERCOCET)  mg per tablet Take one tablet by mouth every 4 to 6  hours as needed for breakthrough pain    oxyCODONE-acetaminophen (PERCOCET)  mg per tablet Take one tablet by mouth every 4 to 6 hours as needed for breakthrough pain    pregabalin (LYRICA) 200 MG Cap TAKE 1 CAPSULE(200 MG) BY MOUTH THREE TIMES DAILY    [DISCONTINUED] pantoprazole (PROTONIX) 40 MG tablet Take 1 tablet (40 mg total) by mouth 2 (two) times daily.       Review of patient's allergies indicates:  No Known Allergies    Past Medical History:   Diagnosis Date    Anemia     Arthritis     Bleeding ulcer 07/2016    Chronic pain     DDD (degenerative disc disease), cervical     DDD (degenerative disc disease), lumbar     Depression     Disc degeneration, lumbosacral     Diverticulitis     Encounter for blood transfusion     Hypertension     IBS (irritable bowel syndrome)     Neuropathy of both feet     Seizures     none  since 2017    Umbilical hernia 2020     Past Surgical History:   Procedure Laterality Date    BACK SURGERY      BREAST SURGERY Right     lumpectomy    CAUDAL EPIDURAL STEROID INJECTION N/A 1/28/2022    Procedure: Injection-steroid-epidural-caudal;  Surgeon: Luzmaria Marcelino MD;  Location: [facility] OR;  Service: Pain Management;  Laterality: N/A;    COLONOSCOPY N/A 1/14/2022    Procedure: COLONOSCOPY;  Surgeon: Abby Landers MD;  Location: [facility];  Service: Endoscopy;  Laterality: N/A;    ENDOSCOPIC ULTRASOUND OF UPPER GASTROINTESTINAL TRACT N/A 7/21/2020    Procedure: ULTRASOUND, UPPER GI TRACT, ENDOSCOPIC;  Surgeon: Marcio Nguyễn III, MD;  Location: [facility];  Service: Endoscopy;  Laterality: N/A;    ESOPHAGOGASTRODUODENOSCOPY N/A 1/14/2022    Procedure: EGD (ESOPHAGOGASTRODUODENOSCOPY);  Surgeon: Abby Landers MD;  Location: [facility];  Service: Endoscopy;  Laterality: N/A;    ESOPHAGOGASTRODUODENOSCOPY N/A 6/10/2022    Procedure: EGD (ESOPHAGOGASTRODUODENOSCOPY);  Surgeon: Abby Landers MD;  Location: [facility];  Service: Endoscopy;   Laterality: N/A;    EYE SURGERY      cataract    HYSTERECTOMY      INTRAMEDULLARY RODDING OF TROCHANTER OF FEMUR Left 12/11/2018    Procedure: INSERTION, INTRAMEDULLARY SANTOS, FEMUR, TROCHANTER;  Surgeon: Eulalio De La Cruz MD;  Location: [facility] OR;  Service: Orthopedics;  Laterality: Left;    SPINAL CORD STIMULATOR IMPLANT  09/18/2013    and removal    SPINE SURGERY  2006    lumbar L2-S1 decompression.    SPINE SURGERY      cervical decompression    TONSILLECTOMY       Family History     Problem Relation (Age of Onset)    COPD Brother    Coronary artery disease Father    Depression Father    Diabetes Mother, Father, Sister, Brother    Heart disease Father    Hypertension Mother, Father    Irritable bowel syndrome Mother        Tobacco Use    Smoking status: Never Smoker    Smokeless tobacco: Never Used   Substance and Sexual Activity    Alcohol use: No    Drug use: No    Sexual activity: Not Currently     Review of Systems   Constitutional: Negative for fever.   HENT: Negative.    Eyes: Negative.    Respiratory: Negative.    Cardiovascular: Negative.    Gastrointestinal: Positive for abdominal distention and abdominal pain.   Endocrine: Negative.    Genitourinary: Negative.    Musculoskeletal: Negative.    Skin: Negative.    Allergic/Immunologic: Negative.    Neurological: Negative.    Hematological: Negative.    Psychiatric/Behavioral: Negative.      Objective:     Vital Signs (Most Recent):  Temp: 98.6 °F (37 °C) (06/15/22 0615)  Pulse: 102 (06/15/22 0845)  Resp: 14 (06/15/22 0845)  BP: (!) 125/57 (06/15/22 0845)  SpO2: 100 % (06/15/22 0845) Vital Signs (24h Range):  Temp:  [97.5 °F (36.4 °C)-98.6 °F (37 °C)] 98.6 °F (37 °C)  Pulse:  [] 102  Resp:  [14-15] 14  SpO2:  [90 %-100 %] 100 %  BP: ()/(50-91) 125/57     Weight: 75.3 kg (166 lb)  Body mass index is 26.79 kg/m².      Intake/Output Summary (Last 24 hours) at 6/15/2022 0936  Last data filed at 6/15/2022 0600  Gross per 24 hour   Intake  2903.77 ml   Output 1350 ml   Net 1553.77 ml       Physical Exam  Constitutional:       General: She is not in acute distress.     Appearance: Normal appearance. She is not ill-appearing, toxic-appearing or diaphoretic.   HENT:      Head: Normocephalic.      Nose: Nose normal.   Eyes:      Conjunctiva/sclera: Conjunctivae normal.   Cardiovascular:      Rate and Rhythm: Normal rate and regular rhythm.   Pulmonary:      Effort: Pulmonary effort is normal.   Abdominal:      General: There is distension.      Palpations: Abdomen is soft.      Tenderness: There is abdominal tenderness. Rebound:      Musculoskeletal:         General: Normal range of motion.      Cervical back: Normal range of motion.   Skin:     General: Skin is warm.   Neurological:      General: No focal deficit present.      Mental Status: She is alert.   Psychiatric:         Mood and Affect: Mood normal.         Significant Labs:  CBC:   Recent Labs   Lab 06/15/22  0319   WBC 5.98   RBC 3.78*   HGB 11.2*   HCT 35.9*      MCV 95   MCH 29.6   MCHC 31.2*     CMP:   Recent Labs   Lab 06/15/22  0319   *   CALCIUM 8.6*   ALBUMIN 2.9*   PROT 5.5*      K 4.7   CO2 16*      BUN 28*   CREATININE 1.3   ALKPHOS 87   ALT 19   AST 37   BILITOT 0.5     Coagulation: No results for input(s): PT, INR, APTT in the last 48 hours.  Lactic Acid:   Recent Labs   Lab 06/15/22  0705   LACTATE 4.0*       Significant Diagnostics:  Multiple CT scans have been reviewed.  Significant dilation of the entirety of the colon.  Decompression of the rectum.  There appears to be impacted stool within the rectosigmoid junction.  No free air.  Repeat CT scans show similar findings pending final radiology read.    Colonoscopy from January was reviewed.  No concerning masses at the rectosigmoid junction.    Assessment/Plan:     Active Diagnoses:    Diagnosis Date Noted POA    PRINCIPAL PROBLEM:  Encephalopathy, metabolic [G93.41] 06/15/2022 Yes    Sepsis [A41.9]  06/15/2022 Yes    PEDRO (acute kidney injury) [N17.9] 06/15/2022 Yes    Iron deficiency anemia [D50.9] 05/18/2022 Yes    HTN (hypertension) [I10] 12/11/2018 Yes      Problems Resolved During this Admission:     76-year-old female with abdominal distension, abdominal pain, lactic acidosis and severe constipation.  She likely has impacted stool within the rectosigmoid junction.  Given normal recent colonoscopy, neoplastic process is felt to be very unlikely.    Recommend aggressive bowel regimen with enemas from below.  Can consider MiraLax p.o. as well.  No plans for surgical intervention unless free perforation.    Thank you for your consult.    Matthew Romero MD  General Surgery  [facility] - Emergency Dept

## 2022-06-15 NOTE — ED NOTES
Patient is drowsy and awakes to stimulation, oriented to name only, tachycardic with irregular heart rate, on 2 L BNC, received x2 L LR, vancomycin, Zosyn & Narcan 1 mg given, awaiting blood work, will continue to monitor.

## 2022-06-15 NOTE — NURSING
Patient continues to have an Altered mental status, drowsy and awakes with stimulation, vital signs stable at this time except on 2 L BNC, needs assessed, safety sweep done, will continue to monitor.

## 2022-06-15 NOTE — HPI
Froilan Ray is a 76-year-old female who presents emergency room for evaluation of nausea, vomiting, abdominal pain, and altered mental status.  EMS reported to ER staff that upon their arrival patient's blood pressure was 74 systolic.  At the time of my exam patient was altered.  No family available at bedside.  All information was obtained from [facility] medical records as well as ER physician.  Previous medical history includes left hip fracture, drug induced constipation, hypertension, iron deficiency anemia, and seizure disorder.  ER workup:  CBC with mild anemia.  CMP with BUN of 31 creatinine 1.9 with bicarb of 18. Initial lactic acid was 1.4.  Second lactic acid was 3.6.  ABG with pH of 7.3, PO2 of 65, bicarb 19, pCO2 of 38. CT of the head was negative for any acute findings.  CT abdomen and pelvis demonstrates a colonic obstruction secondary to constipation of the rectosigmoid junction.  Patient was started on septic protocol.  Patient was given IV fluids.  Patient started on  vancomycin and Zosyn.  Patient admitted to Hospital Medicine for observation management.  Patient be placed in ICU.  Will consult Neurology as well as GI.

## 2022-06-16 PROBLEM — I48.91 ATRIAL FIBRILLATION WITH RVR: Status: ACTIVE | Noted: 2022-06-16

## 2022-06-16 LAB
ALBUMIN SERPL BCP-MCNC: 2.1 G/DL (ref 3.5–5.2)
ALP SERPL-CCNC: 66 U/L (ref 55–135)
ALT SERPL W/O P-5'-P-CCNC: 15 U/L (ref 10–44)
ANION GAP SERPL CALC-SCNC: 9 MMOL/L (ref 8–16)
ANISOCYTOSIS BLD QL SMEAR: SLIGHT
APTT BLDCRRT: 39.4 SEC (ref 21–32)
APTT BLDCRRT: 51.5 SEC (ref 21–32)
AST SERPL-CCNC: 23 U/L (ref 10–40)
BASOPHILS # BLD AUTO: ABNORMAL K/UL (ref 0–0.2)
BASOPHILS NFR BLD: 0 % (ref 0–1.9)
BILIRUB SERPL-MCNC: 0.6 MG/DL (ref 0.1–1)
BUN SERPL-MCNC: 30 MG/DL (ref 8–23)
CALCIUM SERPL-MCNC: 8 MG/DL (ref 8.7–10.5)
CHLORIDE SERPL-SCNC: 109 MMOL/L (ref 95–110)
CO2 SERPL-SCNC: 21 MMOL/L (ref 23–29)
CREAT SERPL-MCNC: 0.8 MG/DL (ref 0.5–1.4)
D DIMER PPP IA.FEU-MCNC: 12.62 MG/L FEU
DIFFERENTIAL METHOD: ABNORMAL
EOSINOPHIL # BLD AUTO: ABNORMAL K/UL (ref 0–0.5)
EOSINOPHIL NFR BLD: 0 % (ref 0–8)
ERYTHROCYTE [DISTWIDTH] IN BLOOD BY AUTOMATED COUNT: 15.5 % (ref 11.5–14.5)
EST. GFR  (AFRICAN AMERICAN): >60 ML/MIN/1.73 M^2
EST. GFR  (NON AFRICAN AMERICAN): >60 ML/MIN/1.73 M^2
GLUCOSE SERPL-MCNC: 118 MG/DL (ref 70–110)
HCT VFR BLD AUTO: 27 % (ref 37–48.5)
HCT VFR BLD AUTO: 29.7 % (ref 37–48.5)
HGB BLD-MCNC: 8.8 G/DL (ref 12–16)
IMM GRANULOCYTES # BLD AUTO: ABNORMAL K/UL (ref 0–0.04)
IMM GRANULOCYTES NFR BLD AUTO: ABNORMAL % (ref 0–0.5)
INR PPP: 1.2 (ref 0.8–1.2)
IRON SERPL-MCNC: 14 UG/DL (ref 30–160)
LYMPHOCYTES # BLD AUTO: ABNORMAL K/UL (ref 1–4.8)
LYMPHOCYTES NFR BLD: 15 % (ref 18–48)
MAGNESIUM SERPL-MCNC: 2.1 MG/DL (ref 1.6–2.6)
MCH RBC QN AUTO: 29.4 PG (ref 27–31)
MCHC RBC AUTO-ENTMCNC: 32.6 G/DL (ref 32–36)
MCV RBC AUTO: 90 FL (ref 82–98)
MONOCYTES # BLD AUTO: ABNORMAL K/UL (ref 0.3–1)
MONOCYTES NFR BLD: 5 % (ref 4–15)
NEUTROPHILS NFR BLD: 62 % (ref 38–73)
NEUTS BAND NFR BLD MANUAL: 18 %
NRBC BLD-RTO: 0 /100 WBC
OB PNL STL: POSITIVE
OVALOCYTES BLD QL SMEAR: ABNORMAL
PHOSPHATE SERPL-MCNC: 3.3 MG/DL (ref 2.7–4.5)
PLATELET # BLD AUTO: 171 K/UL (ref 150–450)
PLATELET BLD QL SMEAR: ABNORMAL
PMV BLD AUTO: 10.1 FL (ref 9.2–12.9)
POCT GLUCOSE: 112 MG/DL (ref 70–110)
POCT GLUCOSE: 150 MG/DL (ref 70–110)
POCT GLUCOSE: 70 MG/DL (ref 70–110)
POCT GLUCOSE: 86 MG/DL (ref 70–110)
POIKILOCYTOSIS BLD QL SMEAR: SLIGHT
POTASSIUM SERPL-SCNC: 3.6 MMOL/L (ref 3.5–5.1)
PROCALCITONIN SERPL IA-MCNC: 10.18 NG/ML
PROT SERPL-MCNC: 4.5 G/DL (ref 6–8.4)
PROTHROMBIN TIME: 12.2 SEC (ref 9–12.5)
RBC # BLD AUTO: 2.99 M/UL (ref 4–5.4)
SATURATED IRON: 7 % (ref 20–50)
SODIUM SERPL-SCNC: 139 MMOL/L (ref 136–145)
TOTAL IRON BINDING CAPACITY: 209 UG/DL (ref 250–450)
TRANSFERRIN SERPL-MCNC: 141 MG/DL (ref 200–375)
VANCOMYCIN SERPL-MCNC: 13.6 UG/ML
WBC # BLD AUTO: 8.91 K/UL (ref 3.9–12.7)

## 2022-06-16 PROCEDURE — 99223 PR INITIAL HOSPITAL CARE,LEVL III: ICD-10-PCS | Mod: ,,, | Performed by: SPECIALIST

## 2022-06-16 PROCEDURE — 94761 N-INVAS EAR/PLS OXIMETRY MLT: CPT

## 2022-06-16 PROCEDURE — 25000003 PHARM REV CODE 250: Performed by: NURSE PRACTITIONER

## 2022-06-16 PROCEDURE — 99232 PR SUBSEQUENT HOSPITAL CARE,LEVL II: ICD-10-PCS | Mod: ,,, | Performed by: INTERNAL MEDICINE

## 2022-06-16 PROCEDURE — 85027 COMPLETE CBC AUTOMATED: CPT | Performed by: NURSE PRACTITIONER

## 2022-06-16 PROCEDURE — 63600175 PHARM REV CODE 636 W HCPCS: Performed by: NURSE PRACTITIONER

## 2022-06-16 PROCEDURE — 85730 THROMBOPLASTIN TIME PARTIAL: CPT | Performed by: SPECIALIST

## 2022-06-16 PROCEDURE — 36415 COLL VENOUS BLD VENIPUNCTURE: CPT | Performed by: HOSPITALIST

## 2022-06-16 PROCEDURE — 25500020 PHARM REV CODE 255: Performed by: HOSPITALIST

## 2022-06-16 PROCEDURE — 93005 ELECTROCARDIOGRAM TRACING: CPT

## 2022-06-16 PROCEDURE — 99900035 HC TECH TIME PER 15 MIN (STAT)

## 2022-06-16 PROCEDURE — 80202 ASSAY OF VANCOMYCIN: CPT | Performed by: HOSPITALIST

## 2022-06-16 PROCEDURE — 27000221 HC OXYGEN, UP TO 24 HOURS

## 2022-06-16 PROCEDURE — 51701 INSERT BLADDER CATHETER: CPT

## 2022-06-16 PROCEDURE — 99232 SBSQ HOSP IP/OBS MODERATE 35: CPT | Mod: ,,, | Performed by: INTERNAL MEDICINE

## 2022-06-16 PROCEDURE — 82272 OCCULT BLD FECES 1-3 TESTS: CPT | Performed by: SPECIALIST

## 2022-06-16 PROCEDURE — 80053 COMPREHEN METABOLIC PANEL: CPT | Performed by: NURSE PRACTITIONER

## 2022-06-16 PROCEDURE — 99232 SBSQ HOSP IP/OBS MODERATE 35: CPT | Mod: S$GLB,,, | Performed by: STUDENT IN AN ORGANIZED HEALTH CARE EDUCATION/TRAINING PROGRAM

## 2022-06-16 PROCEDURE — 99232 SBSQ HOSP IP/OBS MODERATE 35: CPT | Mod: ,,, | Performed by: STUDENT IN AN ORGANIZED HEALTH CARE EDUCATION/TRAINING PROGRAM

## 2022-06-16 PROCEDURE — 85007 BL SMEAR W/DIFF WBC COUNT: CPT | Performed by: NURSE PRACTITIONER

## 2022-06-16 PROCEDURE — 99223 1ST HOSP IP/OBS HIGH 75: CPT | Mod: ,,, | Performed by: SPECIALIST

## 2022-06-16 PROCEDURE — 63600175 PHARM REV CODE 636 W HCPCS: Performed by: SPECIALIST

## 2022-06-16 PROCEDURE — 25000003 PHARM REV CODE 250: Performed by: SPECIALIST

## 2022-06-16 PROCEDURE — 85379 FIBRIN DEGRADATION QUANT: CPT | Performed by: SPECIALIST

## 2022-06-16 PROCEDURE — 63600175 PHARM REV CODE 636 W HCPCS: Performed by: HOSPITALIST

## 2022-06-16 PROCEDURE — 99232 PR SUBSEQUENT HOSPITAL CARE,LEVL II: ICD-10-PCS | Mod: S$GLB,,, | Performed by: STUDENT IN AN ORGANIZED HEALTH CARE EDUCATION/TRAINING PROGRAM

## 2022-06-16 PROCEDURE — 93010 ELECTROCARDIOGRAM REPORT: CPT | Mod: ,,, | Performed by: SPECIALIST

## 2022-06-16 PROCEDURE — 51798 US URINE CAPACITY MEASURE: CPT

## 2022-06-16 PROCEDURE — 25000003 PHARM REV CODE 250: Performed by: HOSPITALIST

## 2022-06-16 PROCEDURE — 63600175 PHARM REV CODE 636 W HCPCS: Performed by: STUDENT IN AN ORGANIZED HEALTH CARE EDUCATION/TRAINING PROGRAM

## 2022-06-16 PROCEDURE — 99232 PR SUBSEQUENT HOSPITAL CARE,LEVL II: ICD-10-PCS | Mod: ,,, | Performed by: STUDENT IN AN ORGANIZED HEALTH CARE EDUCATION/TRAINING PROGRAM

## 2022-06-16 PROCEDURE — 36415 COLL VENOUS BLD VENIPUNCTURE: CPT | Performed by: SPECIALIST

## 2022-06-16 PROCEDURE — 85610 PROTHROMBIN TIME: CPT | Performed by: SPECIALIST

## 2022-06-16 PROCEDURE — 93010 EKG 12-LEAD: ICD-10-PCS | Mod: ,,, | Performed by: SPECIALIST

## 2022-06-16 PROCEDURE — 20000000 HC ICU ROOM

## 2022-06-16 PROCEDURE — 84145 PROCALCITONIN (PCT): CPT | Performed by: HOSPITALIST

## 2022-06-16 PROCEDURE — 85730 THROMBOPLASTIN TIME PARTIAL: CPT | Mod: 91 | Performed by: HOSPITALIST

## 2022-06-16 PROCEDURE — 83735 ASSAY OF MAGNESIUM: CPT | Performed by: NURSE PRACTITIONER

## 2022-06-16 PROCEDURE — 85014 HEMATOCRIT: CPT | Performed by: SPECIALIST

## 2022-06-16 PROCEDURE — 84100 ASSAY OF PHOSPHORUS: CPT | Performed by: NURSE PRACTITIONER

## 2022-06-16 PROCEDURE — 84466 ASSAY OF TRANSFERRIN: CPT | Performed by: SPECIALIST

## 2022-06-16 RX ORDER — ESCITALOPRAM OXALATE 10 MG/1
20 TABLET ORAL NIGHTLY
Status: DISCONTINUED | OUTPATIENT
Start: 2022-06-16 | End: 2022-06-16

## 2022-06-16 RX ORDER — DIGOXIN 0.25 MG/ML
250 INJECTION INTRAMUSCULAR; INTRAVENOUS ONCE
Status: COMPLETED | OUTPATIENT
Start: 2022-06-16 | End: 2022-06-17

## 2022-06-16 RX ORDER — DOCUSATE SODIUM 100 MG/1
100 CAPSULE, LIQUID FILLED ORAL 2 TIMES DAILY
Status: DISCONTINUED | OUTPATIENT
Start: 2022-06-16 | End: 2022-06-20 | Stop reason: HOSPADM

## 2022-06-16 RX ORDER — DILTIAZEM HYDROCHLORIDE 5 MG/ML
10 INJECTION INTRAVENOUS ONCE
Status: DISCONTINUED | OUTPATIENT
Start: 2022-06-16 | End: 2022-06-16

## 2022-06-16 RX ORDER — SODIUM CHLORIDE 9 MG/ML
INJECTION, SOLUTION INTRAVENOUS CONTINUOUS
Status: DISCONTINUED | OUTPATIENT
Start: 2022-06-16 | End: 2022-06-20 | Stop reason: HOSPADM

## 2022-06-16 RX ORDER — DILTIAZEM HYDROCHLORIDE 5 MG/ML
10 INJECTION INTRAVENOUS ONCE
Status: COMPLETED | OUTPATIENT
Start: 2022-06-16 | End: 2022-06-16

## 2022-06-16 RX ORDER — DILTIAZEM HCL 1 MG/ML
0-15 INJECTION, SOLUTION INTRAVENOUS CONTINUOUS
Status: DISCONTINUED | OUTPATIENT
Start: 2022-06-16 | End: 2022-06-16

## 2022-06-16 RX ORDER — DILTIAZEM HYDROCHLORIDE 5 MG/ML
INJECTION INTRAVENOUS
Status: DISPENSED
Start: 2022-06-16 | End: 2022-06-16

## 2022-06-16 RX ORDER — HEPARIN SODIUM,PORCINE/D5W 25000/250
0-40 INTRAVENOUS SOLUTION INTRAVENOUS CONTINUOUS
Status: DISCONTINUED | OUTPATIENT
Start: 2022-06-16 | End: 2022-06-17

## 2022-06-16 RX ORDER — MUPIROCIN 20 MG/G
OINTMENT TOPICAL 2 TIMES DAILY
Status: DISCONTINUED | OUTPATIENT
Start: 2022-06-16 | End: 2022-06-20 | Stop reason: HOSPADM

## 2022-06-16 RX ORDER — METOPROLOL TARTRATE 25 MG/1
25 TABLET, FILM COATED ORAL 3 TIMES DAILY
Status: DISCONTINUED | OUTPATIENT
Start: 2022-06-16 | End: 2022-06-19

## 2022-06-16 RX ORDER — DIGOXIN 0.25 MG/ML
375 INJECTION INTRAMUSCULAR; INTRAVENOUS ONCE
Status: COMPLETED | OUTPATIENT
Start: 2022-06-16 | End: 2022-06-16

## 2022-06-16 RX ORDER — METOPROLOL TARTRATE 1 MG/ML
5 INJECTION, SOLUTION INTRAVENOUS ONCE
Status: COMPLETED | OUTPATIENT
Start: 2022-06-16 | End: 2022-06-16

## 2022-06-16 RX ADMIN — METOPROLOL TARTRATE 25 MG: 25 TABLET, FILM COATED ORAL at 02:06

## 2022-06-16 RX ADMIN — OXYCODONE AND ACETAMINOPHEN 1 TABLET: 10; 325 TABLET ORAL at 08:06

## 2022-06-16 RX ADMIN — FLUCONAZOLE 200 MG: 2 INJECTION, SOLUTION INTRAVENOUS at 08:06

## 2022-06-16 RX ADMIN — SODIUM CHLORIDE: 0.9 INJECTION, SOLUTION INTRAVENOUS at 10:06

## 2022-06-16 RX ADMIN — PIPERACILLIN SODIUM AND TAZOBACTAM SODIUM 4.5 G: 4; .5 INJECTION, POWDER, LYOPHILIZED, FOR SOLUTION INTRAVENOUS at 09:06

## 2022-06-16 RX ADMIN — DOCUSATE SODIUM 100 MG: 100 CAPSULE, LIQUID FILLED ORAL at 01:06

## 2022-06-16 RX ADMIN — VANCOMYCIN HYDROCHLORIDE 1500 MG: 1.5 INJECTION, POWDER, LYOPHILIZED, FOR SOLUTION INTRAVENOUS at 08:06

## 2022-06-16 RX ADMIN — POLYETHYLENE GLYCOL 3350 17 G: 17 POWDER, FOR SOLUTION ORAL at 08:06

## 2022-06-16 RX ADMIN — SODIUM CHLORIDE: 0.9 INJECTION, SOLUTION INTRAVENOUS at 08:06

## 2022-06-16 RX ADMIN — MUPIROCIN: 20 OINTMENT TOPICAL at 03:06

## 2022-06-16 RX ADMIN — SODIUM CHLORIDE: 0.9 INJECTION, SOLUTION INTRAVENOUS at 03:06

## 2022-06-16 RX ADMIN — METOPROLOL TARTRATE 25 MG: 25 TABLET, FILM COATED ORAL at 08:06

## 2022-06-16 RX ADMIN — OXYCODONE AND ACETAMINOPHEN 1 TABLET: 10; 325 TABLET ORAL at 02:06

## 2022-06-16 RX ADMIN — AMIODARONE HYDROCHLORIDE 0.5 MG/MIN: 1.8 INJECTION, SOLUTION INTRAVENOUS at 05:06

## 2022-06-16 RX ADMIN — IOHEXOL 75 ML: 350 INJECTION, SOLUTION INTRAVENOUS at 03:06

## 2022-06-16 RX ADMIN — OXYCODONE AND ACETAMINOPHEN 1 TABLET: 10; 325 TABLET ORAL at 09:06

## 2022-06-16 RX ADMIN — MUPIROCIN: 20 OINTMENT TOPICAL at 08:06

## 2022-06-16 RX ADMIN — DOCUSATE SODIUM 100 MG: 100 CAPSULE, LIQUID FILLED ORAL at 08:06

## 2022-06-16 RX ADMIN — DIGOXIN 375 MCG: 0.25 INJECTION INTRAMUSCULAR; INTRAVENOUS at 11:06

## 2022-06-16 RX ADMIN — PIPERACILLIN SODIUM AND TAZOBACTAM SODIUM 4.5 G: 4; .5 INJECTION, POWDER, LYOPHILIZED, FOR SOLUTION INTRAVENOUS at 01:06

## 2022-06-16 RX ADMIN — METOROPROLOL TARTRATE 5 MG: 5 INJECTION, SOLUTION INTRAVENOUS at 01:06

## 2022-06-16 RX ADMIN — SODIUM CHLORIDE: 0.9 INJECTION, SOLUTION INTRAVENOUS at 07:06

## 2022-06-16 RX ADMIN — PIPERACILLIN SODIUM AND TAZOBACTAM SODIUM 4.5 G: 4; .5 INJECTION, POWDER, LYOPHILIZED, FOR SOLUTION INTRAVENOUS at 05:06

## 2022-06-16 RX ADMIN — HEPARIN SODIUM AND DEXTROSE 12 UNITS/KG/HR: 10000; 5 INJECTION INTRAVENOUS at 02:06

## 2022-06-16 RX ADMIN — DILTIAZEM HYDROCHLORIDE 10 MG: 5 INJECTION, SOLUTION INTRAVENOUS at 09:06

## 2022-06-16 RX ADMIN — AMIODARONE HYDROCHLORIDE 150 MG: 1.5 INJECTION, SOLUTION INTRAVENOUS at 11:06

## 2022-06-16 RX ADMIN — AMIODARONE HYDROCHLORIDE 1 MG/MIN: 1.8 INJECTION, SOLUTION INTRAVENOUS at 11:06

## 2022-06-16 NOTE — ASSESSMENT & PLAN NOTE
Chronic  Chronic, controlled.  Latest blood pressure and vitals reviewed-   Temp:  [97.6 °F (36.4 °C)-98.8 °F (37.1 °C)]   Pulse:  [102-115]   Resp:  [18-28]   BP: ()/(50-67)   SpO2:  [93 %-99 %] .   Home meds for hypertension were reviewed and noted below.   Hypertension Medications             amlodipine-benazepril 5-20 mg (LOTREL) 5-20 mg per capsule Take 1 capsule by mouth once daily.    furosemide (LASIX) 40 MG tablet Take 40 mg by mouth daily as needed.          While in the hospital, will manage blood pressure as follows; Continue home antihypertensive regimen    Will utilize p.r.n. blood pressure medication only if patient's blood pressure greater than  180/110 and she develops symptoms such as worsening chest pain or shortness of breath.

## 2022-06-16 NOTE — CARE UPDATE
06/15/22 1928   Patient Assessment/Suction   Level of Consciousness (AVPU) responds to voice   Respiratory Effort Normal;Unlabored   PRE-TX-O2   O2 Device (Oxygen Therapy) nasal cannula   $ Is the patient on Low Flow Oxygen? Yes   Oxygen Concentration (%) 2   SpO2 98 %   Pulse Oximetry Type Continuous   $ Pulse Oximetry - Multiple Charge Pulse Oximetry - Multiple   Pulse 102   Resp 19   Aerosol Therapy   $ Aerosol Therapy Charges PRN treatment not required   Respiratory Treatment Status (SVN) PRN treatment not required   Ready to Wean/Extubation Screen   FIO2<=50 (chart decimal) 0.02

## 2022-06-16 NOTE — SUBJECTIVE & OBJECTIVE
Interval History: Patient seen and examined. Looks much better today. Many BMs overnight, KUB improved, change to oral bowel regimen. Afib RVR this AM, Cardiology consulted and started amiodarone. Did not recommend anticoagulation given anemia history    Review of Systems   Constitutional:  Negative for chills and fever.   HENT:  Negative for congestion and rhinorrhea.    Respiratory:  Negative for cough, shortness of breath and wheezing.    Cardiovascular:  Negative for chest pain and leg swelling.   Gastrointestinal:  Positive for abdominal pain (mild). Negative for abdominal distention, nausea and vomiting.   Endocrine: Negative for cold intolerance and heat intolerance.   Genitourinary:  Negative for dysuria and urgency.   Musculoskeletal:  Negative for arthralgias and neck stiffness.   Skin:  Negative for rash and wound.   Neurological:  Negative for dizziness and weakness.   Objective:     Vital Signs (Most Recent):  Temp: 98.8 °F (37.1 °C) (06/16/22 0745)  Pulse: 109 (06/16/22 0504)  Resp: (!) 21 (06/16/22 0834)  BP: (!) 93/55 (06/16/22 0504)  SpO2: 96 % (06/16/22 0504)   Vital Signs (24h Range):  Temp:  [97.6 °F (36.4 °C)-98.8 °F (37.1 °C)] 98.8 °F (37.1 °C)  Pulse:  [102-115] 109  Resp:  [18-28] 21  SpO2:  [93 %-99 %] 96 %  BP: ()/(50-67) 93/55     Weight: 80.2 kg (176 lb 12.9 oz)  Body mass index is 28.54 kg/m².    Intake/Output Summary (Last 24 hours) at 6/16/2022 1201  Last data filed at 6/16/2022 0758  Gross per 24 hour   Intake 569.82 ml   Output 1400 ml   Net -830.18 ml      Physical Exam  Vitals and nursing note reviewed.   Constitutional:       General: She is not in acute distress.     Appearance: She is well-developed. She is not diaphoretic.   HENT:      Head: Normocephalic.      Mouth/Throat:      Mouth: Mucous membranes are dry.   Eyes:      General: No scleral icterus.     Conjunctiva/sclera: Conjunctivae normal.      Pupils: Pupils are equal, round, and reactive to light.   Neck:       Vascular: No JVD.   Cardiovascular:      Rate and Rhythm: Tachycardia present. Rhythm irregular.      Heart sounds: Normal heart sounds. No murmur heard.    No friction rub. No gallop.   Pulmonary:      Effort: Pulmonary effort is normal. No respiratory distress.      Breath sounds: Normal breath sounds. No wheezing or rales.   Abdominal:      General: Bowel sounds are normal. There is no distension.      Palpations: Abdomen is soft.      Tenderness: There is abdominal tenderness (mild). There is no guarding or rebound.   Musculoskeletal:         General: No tenderness. Normal range of motion.      Cervical back: Normal range of motion and neck supple.   Lymphadenopathy:      Cervical: No cervical adenopathy.   Skin:     General: Skin is warm and dry.      Capillary Refill: Capillary refill takes less than 2 seconds.      Coloration: Skin is pale.      Findings: No erythema or rash.   Neurological:      Mental Status: She is oriented to person, place, and time.       Significant Labs: All pertinent labs within the past 24 hours have been reviewed.    Significant Imaging: I have reviewed all pertinent imaging results/findings within the past 24 hours.

## 2022-06-16 NOTE — ASSESSMENT & PLAN NOTE
"This patient does have evidence of infective focus  My overall impression is sepsis. Vital signs were reviewed and noted in progress note.  Antibiotics given-   Antibiotics (From admission, onward)            Start     Stop Route Frequency Ordered    06/16/22 0945  mupirocin 2 % ointment         06/21 0859 Nasl 2 times daily 06/16/22 0835    06/15/22 0930  piperacillin-tazobactam 4.5 g in dextrose 5 % 100 mL IVPB (ready to mix system)         -- IV Every 8 hours (non-standard times) 06/15/22 0402    06/15/22 0502  vancomycin - pharmacy to dose  (vancomycin IVPB)        "And" Linked Group Details    -- IV pharmacy to manage frequency 06/15/22 0402        Cultures were taken-   Microbiology Results (last 7 days)     Procedure Component Value Units Date/Time    Blood culture x two cultures. Draw prior to antibiotics. [[id number]] Collected: 06/14/22 2358    Order Status: Completed Specimen: Blood from Peripheral, Right Updated: 06/15/22 1715     Blood Culture, Routine No Growth to date    Narrative:      Aerobic and anaerobic    Blood culture x two cultures. Draw prior to antibiotics. [[id number]] Collected: 06/14/22 2357    Order Status: Completed Specimen: Blood Updated: 06/15/22 1715     Blood Culture, Routine No Growth to date    Narrative:      Aerobic and anaerobic        Latest lactate reviewed, they are-  Recent Labs   Lab 06/15/22  0705 06/15/22  0959 06/15/22  1200   LACTATE 4.0* 2.9* 1.1       Organ dysfunction indicated by Acute kidney injury  Source- unknown    Source control Achieved by- Abx per ID    "

## 2022-06-16 NOTE — ASSESSMENT & PLAN NOTE
Monitor   Amiodarone per Cardiology  No anticoagulation despite CHADsVasc 2 per cardiology 2/2 anemia history

## 2022-06-16 NOTE — ASSESSMENT & PLAN NOTE
Acute problem  Patient with acute kidney injury likely d/t IVVD/Dehydration  caused by dehydration. PEDRO is currently improving. Labs reviewed- Renal function/electrolytes with Estimated Creatinine Clearance: 65.9 mL/min (based on SCr of 0.8 mg/dL). according to latest data. Monitor urine output and serial BMP and adjust therapy as needed. Avoid nephrotoxins and renally dose meds for GFR listed above.

## 2022-06-16 NOTE — PROGRESS NOTES
[facility] Note    CC: Fecal impaction    HPI 76 y.o. female who presents emergency room for evaluation of nausea, vomiting, abdominal pain, and altered mental status.  EMS reported to ER staff that upon their arrival patient's blood pressure was 74 systolic.  At the time of my exam patient was altered.  No family available at bedside.  All information was obtained from [facility] medical records as well as ER physician.  Previous medical history includes left hip fracture, drug induced constipation, hypertension, iron deficiency anemia, and seizure disorder.  ER workup:  CBC with mild anemia.  CMP with BUN of 31 creatinine 1.9 with bicarb of 18. Initial lactic acid was 1.4.  Second lactic acid was 3.6.  ABG with pH of 7.3, PO2 of 65, bicarb 19, pCO2 of 38. CT of the head was negative for any acute findings.  CT abdomen and pelvis demonstrates a colonic obstruction secondary to constipation of the rectosigmoid junction.  Patient was started on septic protocol.  Patient was given IV fluids.  Patient started on  vancomycin and Zosyn.  Patient admitted to Hospital Medicine for observation management.  Patient be placed in ICU.  Will consult Neurology as well as GI.     FURTHER HISTORY:  Above obtained from independent review of records from admitting provider as well as from direct discussion with Bob Vargas and Nick who state patient has evidence of rectosigmoid obstipation on imaging.  In addition, on my interview, I note the following:  Patient complains of abdominal pain, lower abdomen, moderate to severe, with no alleviating/exacerbating factors.  She states onset is recent.  Denies bleeding.  Imaging reviewed and shows stool impaction.  Recent colonoscopy unremarkable.  Recent EGD showed small gastric ulcer.  Patient admits to long term narcotic medication use.    INTERVAL HISTORY:  Patient s/p multiple BMs per nursing.  Cardiology considering anticoagulation. No bleeding.  No other acute  "issues.    Past Medical History:   Diagnosis Date    Anemia     Arthritis     Bleeding ulcer 07/2016    Chronic pain     DDD (degenerative disc disease), cervical     DDD (degenerative disc disease), lumbar     Depression     Disc degeneration, lumbosacral     Diverticulitis     Encounter for blood transfusion     Hypertension     IBS (irritable bowel syndrome)     Neuropathy of both feet     Seizures     none  since 2017    Umbilical hernia 2020         Review of Systems  General ROS: negative for - chills, fever or weight loss  Cardiovascular ROS: no chest pain or dyspnea on exertion  Gastrointestinal ROS: no bleeding    Physical Examination  BP (!) 93/55   Pulse 109   Temp 98.8 °F (37.1 °C) (Oral)   Resp 20   Ht 5' 6" (1.676 m)   Wt 80.2 kg (176 lb 12.9 oz)   SpO2 96%   Breastfeeding No   BMI 28.54 kg/m²   General appearance: alert, cooperative, no distress, frail/elderly/ill-appearing  HENT: Normocephalic, atraumatic, neck symmetrical, no nasal discharge   Eyes: conjunctivae/corneas clear, PERRL, EOM's intact, sclera anicteric  Lungs: clear to auscultation bilaterally, no dullness to percussion bilaterally, symmetric expansion, breathing unlabored  Heart: regular rate and rhythm without rub; no displacement of the PMI   Abdomen: soft, with TTP in lower quadrants.  No peritoneal signs.  Hypoactive bowel sounds.  Extremities: extremities symmetric; no clubbing, cyanosis, or edema  Integument: Skin pale, normal texture, turgor normal; no rashes; hair distrubution normal, no jaundice  Neurologic: Alert and oriented X 3, no focal sensory or motor neurologic deficits  Psychiatric: no pressured speech; normal affect; no evidence of impaired cognition, no anxiety/depression       Labs:  Lab Results   Component Value Date    WBC 8.91 06/16/2022    HGB 8.8 (L) 06/16/2022    HCT 27.0 (L) 06/16/2022    MCV 90 06/16/2022     06/16/2022       CMP  Sodium   Date Value Ref Range Status   06/16/2022 " 139 136 - 145 mmol/L Final     Potassium   Date Value Ref Range Status   06/16/2022 3.6 3.5 - 5.1 mmol/L Final     Chloride   Date Value Ref Range Status   06/16/2022 109 95 - 110 mmol/L Final     CO2   Date Value Ref Range Status   06/16/2022 21 (L) 23 - 29 mmol/L Final     Glucose   Date Value Ref Range Status   06/16/2022 118 (H) 70 - 110 mg/dL Final     BUN   Date Value Ref Range Status   06/16/2022 30 (H) 8 - 23 mg/dL Final     Creatinine   Date Value Ref Range Status   06/16/2022 0.8 0.5 - 1.4 mg/dL Final     Calcium   Date Value Ref Range Status   06/16/2022 8.0 (L) 8.7 - 10.5 mg/dL Final     Total Protein   Date Value Ref Range Status   06/16/2022 4.5 (L) 6.0 - 8.4 g/dL Final     Albumin   Date Value Ref Range Status   06/16/2022 2.1 (L) 3.5 - 5.2 g/dL Final     Total Bilirubin   Date Value Ref Range Status   06/16/2022 0.6 0.1 - 1.0 mg/dL Final     Comment:     For infants and newborns, interpretation of results should be based  on gestational age, weight and in agreement with clinical  observations.    Premature Infant recommended reference ranges:  Up to 24 hours.............<8.0 mg/dL  Up to 48 hours............<12.0 mg/dL  3-5 days..................<15.0 mg/dL  6-29 days.................<15.0 mg/dL       Alkaline Phosphatase   Date Value Ref Range Status   06/16/2022 66 55 - 135 U/L Final     AST   Date Value Ref Range Status   06/16/2022 23 10 - 40 U/L Final     ALT   Date Value Ref Range Status   06/16/2022 15 10 - 44 U/L Final     Anion Gap   Date Value Ref Range Status   06/16/2022 9 8 - 16 mmol/L Final     eGFR if    Date Value Ref Range Status   06/16/2022 >60 >60 mL/min/1.73 m^2 Final     eGFR if non    Date Value Ref Range Status   06/16/2022 >60 >60 mL/min/1.73 m^2 Final     Comment:     Calculation used to obtain the estimated glomerular filtration  rate (eGFR) is the CKD-EPI equation.        Lab Results   Component Value Date    IRON 42 05/11/2022    [facility] 448  05/11/2022    FERRITIN 9 (L) 05/11/2022         Patient has had recent EGD/colonoscopy.  No GI contraindication to anticoagulation.  Needs to be on lifelong PPI.    Assessment:   1.  Obstipation - resolved  2.  Stool impaction - resolved  3.  Abdominal pain - resolved  4.  Multiple medical problems     Plan:  1.  Minimize/avoid narcotics  2.  Soap suds enemas to attempt disimpaction as needed.  3.  Miralax PO BID  4.  Daily PPI  5.  Mineral oil PO/ND for above PRN  6.  No indication for scope at this time as she has had recent EGD and colonoscopy. No GI contraindication to anticoagulation per cardiology recommendations.  Will sign off.  Call for questions.    Jose Howe MD  [facility]  [address], [address]  [place] [address]  Office: [phone]  Fax: [phone]

## 2022-06-16 NOTE — CARE UPDATE
06/16/22 0818   Patient Assessment/Suction   Level of Consciousness (AVPU) alert   Respiratory Effort Normal;Unlabored   All Lung Fields Breath Sounds diminished;clear   PRE-TX-O2   O2 Device (Oxygen Therapy) room air   Pulse Oximetry Type Continuous   $ Pulse Oximetry - Multiple Charge Pulse Oximetry - Multiple   Aerosol Therapy   $ Aerosol Therapy Charges PRN treatment not required   Respiratory Treatment Status (SVN) PRN treatment not required

## 2022-06-16 NOTE — PT/OT/SLP PROGRESS
Physical Therapy      Patient Name:  Froilan Ray   MRN:  [id number]    Patient not seen today secondary to Pain, Other (Comment) (RN hold Per OT: d/t increased pain and HR, went into Afib.). Will follow-up 6/17/22.

## 2022-06-16 NOTE — PLAN OF CARE
Intervention: collaboration with care providers     Recommendation:  1) Advance PO diet to goal of regular, textur per SLP   2) weigh weekly   3) Add Boost plus BID-vanilla if PO intakes are note 75% of meals or more at f/u    Goals: 1) PO dieta advanced in < 4 days  Nutrition Goal Status: new  Communication of RD Recs:  (POC, sticky note)

## 2022-06-16 NOTE — PROGRESS NOTES
Patient seen and examined.  She feels much better.  More lucid.  Significantly less abdominal pain and distension.  Patient had multiple bowel movements overnight.    Vital stable  Abdomen soft    Lactic cleared    I will sign off.  Please call with questions.

## 2022-06-16 NOTE — CONSULTS
[facility]  Cardiology  Consult Note    Patient Name: Froilan Ray  MRN: [id number]  Admission Date: 6/14/2022  Hospital Length of Stay: 1 days  Code Status: Full Code   Attending Provider: Tamara Vargas MD   Consulting Provider: Primo Gamino MD  Primary Care Physician: Primary Doctor No  Principal Problem:Encephalopathy, metabolic    Patient information was obtained from patient, spouse/SO, past medical records and ER records.     Consults  Subjective:     Chief Complaint:  Atrial fibrillation     HPI:  Problem 1 patient is in atrial fib with RVR and is asymptomatic  She has had paroxysmal atrial fibrillation for a long time but has not been treated  She is not on blood thinners  She denies any cardiovascular symptoms  She was in sinus rhythm on admission  2. She has a history of chronic anemia-in January she had a hemoglobin of 4.8  She has had colonoscopy  Reason EGD showed gastric ulcer which apparently was not bleeding  She denies melanotic stool  She has recently received IV iron infusion  It is not entirely clear where her blood loss is coming from  She is not on aspirin  3. Hypertension and she is on amlodipine benazepril as well as Lasix         Past Medical History:   Diagnosis Date    Anemia     Arthritis     Bleeding ulcer 07/2016    Chronic pain     DDD (degenerative disc disease), cervical     DDD (degenerative disc disease), lumbar     Depression     Disc degeneration, lumbosacral     Diverticulitis     Encounter for blood transfusion     Hypertension     IBS (irritable bowel syndrome)     Neuropathy of both feet     Seizures     none  since 2017    Umbilical hernia 2020       Past Surgical History:   Procedure Laterality Date    BACK SURGERY      BREAST SURGERY Right     lumpectomy    CAUDAL EPIDURAL STEROID INJECTION N/A 1/28/2022    Procedure: Injection-steroid-epidural-caudal;  Surgeon: Luzmaria Marcelino MD;  Location: [facility];  Service: Pain  Management;  Laterality: N/A;    COLONOSCOPY N/A 1/14/2022    Procedure: COLONOSCOPY;  Surgeon: Abby Landers MD;  Location: [facility];  Service: Endoscopy;  Laterality: N/A;    ENDOSCOPIC ULTRASOUND OF UPPER GASTROINTESTINAL TRACT N/A 7/21/2020    Procedure: ULTRASOUND, UPPER GI TRACT, ENDOSCOPIC;  Surgeon: Marcio Nguyễn III, MD;  Location: [facility] END[facility];  Service: Endoscopy;  Laterality: N/A;    ESOPHAGOGASTRODUODENOSCOPY N/A 1/14/2022    Procedure: EGD (ESOPHAGOGASTRODUODENOSCOPY);  Surgeon: Abby Landers MD;  Location: [facility];  Service: Endoscopy;  Laterality: N/A;    ESOPHAGOGASTRODUODENOSCOPY N/A 6/10/2022    Procedure: EGD (ESOPHAGOGASTRODUODENOSCOPY);  Surgeon: Abby Landers MD;  Location: [facility] END[facility];  Service: Endoscopy;  Laterality: N/A;    EYE SURGERY      cataract    HYSTERECTOMY      INTRAMEDULLARY RODDING OF TROCHANTER OF FEMUR Left 12/11/2018    Procedure: INSERTION, INTRAMEDULLARY SANTOS, FEMUR, TROCHANTER;  Surgeon: Eulalio De La Cruz MD;  Location: [facility];  Service: Orthopedics;  Laterality: Left;    SPINAL CORD STIMULATOR IMPLANT  09/18/2013    and removal    SPINE SURGERY  2006    lumbar L2-S1 decompression.    SPINE SURGERY      cervical decompression    TONSILLECTOMY         Review of patient's allergies indicates:  No Known Allergies    No current facility-administered medications on file prior to encounter.     Current Outpatient Medications on File Prior to Encounter   Medication Sig    acetaminophen (TYLENOL) 325 MG tablet Take 650 mg by mouth once.    amlodipine-benazepril 5-20 mg (LOTREL) 5-20 mg per capsule Take 1 capsule by mouth once daily.    ascorbic acid, vitamin C, (VITAMIN C) 250 MG tablet Take 1 tablet (250 mg total) by mouth once daily.    cyanocobalamin (VITAMIN B-12) 100 MCG tablet Take 1 tablet (100 mcg total) by mouth once daily.    cyclobenzaprine (FLEXERIL) 10 MG tablet TAKE 1 TABLET(10 MG) BY MOUTH THREE TIMES DAILY AS NEEDED FOR MUSCLE  SPASMS    EScitalopram oxalate (LEXAPRO) 20 MG tablet TAKE 1 TABLET(20 MG) BY MOUTH EVERY DAY    ferrous sulfate (FEOSOL) Tab tablet Take 1 tablet (1 each total) by mouth daily with breakfast. (Patient not taking: Reported on 6/10/2022)    furosemide (LASIX) 40 MG tablet Take 40 mg by mouth daily as needed.    omeprazole (PRILOSEC) 40 MG capsule Take 1 capsule (40 mg total) by mouth 2 (two) times daily before meals.    oxyCODONE-acetaminophen (PERCOCET)  mg per tablet Take 1 tablet by mouth every 6 (six) hours as needed for Pain.    oxyCODONE-acetaminophen (PERCOCET)  mg per tablet Take one tablet by mouth every 4-6 hours as needed for pain    oxyCODONE-acetaminophen (PERCOCET)  mg per tablet Take one tablet by mouth every 4 to 6 hours as needed for breakthrough pain    oxyCODONE-acetaminophen (PERCOCET)  mg per tablet Take one tablet by mouth every 4 to 6 hours as needed for breakthrough pain    pregabalin (LYRICA) 200 MG Cap TAKE 1 CAPSULE(200 MG) BY MOUTH THREE TIMES DAILY    [DISCONTINUED] pantoprazole (PROTONIX) 40 MG tablet Take 1 tablet (40 mg total) by mouth 2 (two) times daily.     Family History     Problem Relation (Age of Onset)    COPD Brother    Coronary artery disease Father    Depression Father    Diabetes Mother, Father, Sister, Brother    Heart disease Father    Hypertension Mother, Father    Irritable bowel syndrome Mother        Tobacco Use    Smoking status: Never Smoker    Smokeless tobacco: Never Used   Substance and Sexual Activity    Alcohol use: No    Drug use: No    Sexual activity: Not Currently     ROS  Objective:     Vital Signs (Most Recent):  Temp: 98.8 °F (37.1 °C) (06/16/22 0745)  Pulse: 109 (06/16/22 0504)  Resp: (!) 21 (06/16/22 0834)  BP: (!) 93/55 (06/16/22 0504)  SpO2: 96 % (06/16/22 0504) Vital Signs (24h Range):  Temp:  [97.6 °F (36.4 °C)-98.8 °F (37.1 °C)] 98.8 °F (37.1 °C)  Pulse:  [102-115] 109  Resp:  [18-28] 21  SpO2:  [93 %-99 %]  96 %  BP: ()/(50-67) 93/55     Weight: 80.2 kg (176 lb 12.9 oz)  Body mass index is 28.54 kg/m².    SpO2: 96 %  O2 Device (Oxygen Therapy): room air      Intake/Output Summary (Last 24 hours) at 6/16/2022 1105  Last data filed at 6/16/2022 0758  Gross per 24 hour   Intake 569.82 ml   Output 1400 ml   Net -830.18 ml       Lines/Drains/Airways     Peripheral Intravenous Line  Duration                Peripheral IV - Single Lumen 06/14/22 20 G Left Antecubital 2 days         Peripheral IV - Single Lumen 06/15/22 0120 18 G Anterior;Right 1 day                Physical Exam blood pressure is 100/80 pulse rate is 110  Patient is pale   No bruit  Lungs are clear  Cardiac irregular  Abdomen mild tenderness  Mild edema    Significant Labs:   CMP   Recent Labs   Lab 06/14/22  2345 06/15/22  0319 06/16/22  0444    139 139   K 4.9 4.7 3.6   * 110 109   CO2 18* 16* 21*    126* 118*   BUN 31* 28* 30*   CREATININE 1.9* 1.3 0.8   CALCIUM 9.2 8.6* 8.0*   PROT 6.1 5.5* 4.5*   ALBUMIN 3.4* 2.9* 2.1*   BILITOT 0.4 0.5 0.6   ALKPHOS 100 87 66   AST 35 37 23   ALT 18 19 15   ANIONGAP 13 13 9   ESTGFRAFRICA 30* 47* >60   EGFRNONAA 26* 41* >60   , CBC   Recent Labs   Lab 06/14/22  2344 06/15/22  0319 06/16/22  0445   WBC 10.50 5.98 8.91   HGB 11.3* 11.2* 8.8*   HCT 35.7* 35.9* 27.0*    197 171    and Troponin No results for input(s): TROPONINI in the last 48 hours.    Significant Imaging:   Assessment and Plan:   1. History of paroxysmal atrial fib with current AFib RVR-will give amiodarone and medication try to get her in regular rhythm  She is chads Vasc 2  Hold off on blood thinners in light of blood loss anemia exact etiology not clear  She may need further GI evaluation for occult blood loss and or Hematology evaluation   Active Diagnoses:    Diagnosis Date Noted POA    PRINCIPAL PROBLEM:  Encephalopathy, metabolic [G93.41] 06/15/2022 Yes    Sepsis [A41.9] 06/15/2022 Yes    PEDRO (acute kidney injury)  [N17.9] 06/15/2022 Yes    Iron deficiency anemia [D50.9] 05/18/2022 Yes    HTN (hypertension) [I10] 12/11/2018 Yes      Problems Resolved During this Admission:       VTE Risk Mitigation (From admission, onward)         Ordered     IP VTE HIGH RISK PATIENT  Once         06/15/22 0401     Place sequential compression device  Until discontinued         06/15/22 0401     Place CARI hose  Until discontinued         06/15/22 0401              I personally reviewed old and new ecg's, lab reports,, xray reports  and  other cardiovascular studies including  echo's, stress tests, angiogram reports, holters,and vascular studies .  In addition I evaluated original cardiac cath  ___echo  ____cxr ______ct ____scan on [employer] or [employer] or other viewing platforms .  I reviewed  office and hospital notes Yes _x___ of  referring providers.    Thank you for your consult.     Primo Gamino MD  Cardiology   [facility]

## 2022-06-16 NOTE — PLAN OF CARE
Problem: Adult Inpatient Plan of Care  Goal: Plan of Care Review  Outcome: Ongoing, Progressing     Problem: Infection  Goal: Absence of Infection Signs and Symptoms  Outcome: Ongoing, Progressing     Problem: Skin Injury Risk Increased  Goal: Skin Health and Integrity  Outcome: Ongoing, Progressing     Problem: Adjustment to Illness (Sepsis/Septic Shock)  Goal: Optimal Coping  Outcome: Ongoing, Progressing     Problem: Glycemic Control Impaired (Sepsis/Septic Shock)  Goal: Blood Glucose Level Within Desired Range  Outcome: Ongoing, Progressing     Problem: Fluid and Electrolyte Imbalance (Acute Kidney Injury/Impairment)  Goal: Fluid and Electrolyte Balance  Outcome: Ongoing, Progressing     Problem: Renal Function Impairment (Acute Kidney Injury/Impairment)  Goal: Effective Renal Function  Outcome: Ongoing, Progressing

## 2022-06-16 NOTE — PROGRESS NOTES
Consult Note  Infectious Disease    Reason for Consult:  Sepsis, rule out UTI    HPI: Froilan Ray is a 76 y.o. female with past medical history of HTN, diverticulosis, irritable bowel syndrome, chronic anemia due to iron and B12 deficiency, epilepsy, chronic back pain and PUD who recently had an endoscopy outpatient 6/10 for her PUD, biopsy showed gastric antrum positive for H pylori.  She comes to the ER for severe abdominal pain, nausea, vomit, and altered mental status.  As per records, she denied fever, chills, cough, shortness of breath, dysuria or increased urinary frequency.    Patient seen and examined in the ER, acutely ill-appearing, pale, somnolent, in able to answer questions although seems confused  Hypotensive, tachycardic, afebrile, acute abdomen  Labs with white count of 10.5, PMN 87.6%, bands of 7%, H&H 11.3/35.7, platelet count 238  PEDRO, creatinine 1.9, normal LFTs  Lactic acid 4  UA negative  Chest x-ray negative  CT head with no acute abnormalities  CT abdomen pelvis without contrast with dilated bowel, no evidence of free air.  Constipation    Discussed with ER attending, plan to get surgery stat, repeat CT abdomen and pelvis with IV contrast.    Patient admitted for sepsis likely secondary to acute abdomen/SBO, rule out diverticulitis in the setting of constipation.    ID consult for sepsis, rule out UTI.    INTERVAL HISTORY:  6/16: Interim reviewed, discussed with Sumi, no indication for intervention given significant amount of stool noted on imaging. Patient started on bowel regimen yesterday, 9x BMs recorded. Seen and examined at bedside, labile BP, tachycardic, looks significantly better, admits to taking more pain meds at home likely cause of her constipation, denied urinary symptoms prior to admission. Labs reviewed, WBC8.9, bands 18%. H/h 8.8/27, plt: 171. Normal kidney function, procal 10.1. Micro reviewed, blood cultures no growth to date, pending final.     Review of  patient's allergies indicates:  No Known Allergies  Past Medical History:   Diagnosis Date    Anemia     Arthritis     Bleeding ulcer 07/2016    Chronic pain     DDD (degenerative disc disease), cervical     DDD (degenerative disc disease), lumbar     Depression     Disc degeneration, lumbosacral     Diverticulitis     Encounter for blood transfusion     Hypertension     IBS (irritable bowel syndrome)     Neuropathy of both feet     Seizures     none  since 2017    Umbilical hernia 2020     Past Surgical History:   Procedure Laterality Date    BACK SURGERY      BREAST SURGERY Right     lumpectomy    CAUDAL EPIDURAL STEROID INJECTION N/A 1/28/2022    Procedure: Injection-steroid-epidural-caudal;  Surgeon: Luzmaria Marcelino MD;  [facility];  Service: Pain Management;  Laterality: N/A;    COLONOSCOPY N/A 1/14/2022    Procedure: COLONOSCOPY;  Surgeon: Abby Landers MD;  Location: [facility];  Service: Endoscopy;  Laterality: N/A;    ENDOSCOPIC ULTRASOUND OF UPPER GASTROINTESTINAL TRACT N/A 7/21/2020    Procedure: ULTRASOUND, UPPER GI TRACT, ENDOSCOPIC;  Surgeon: Marcio Nguyễn III, MD;  Location: [facility];  Service: Endoscopy;  Laterality: N/A;    ESOPHAGOGASTRODUODENOSCOPY N/A 1/14/2022    Procedure: EGD (ESOPHAGOGASTRODUODENOSCOPY);  Surgeon: Abby Landers MD;  Location: [facility] ENDO;  Service: Endoscopy;  Laterality: N/A;    ESOPHAGOGASTRODUODENOSCOPY N/A 6/10/2022    Procedure: EGD (ESOPHAGOGASTRODUODENOSCOPY);  Surgeon: Abby Landers MD;  Location: [facility] END[facility];  Service: Endoscopy;  Laterality: N/A;    EYE SURGERY      cataract    HYSTERECTOMY      INTRAMEDULLARY RODDING OF TROCHANTER OF FEMUR Left 12/11/2018    Procedure: INSERTION, INTRAMEDULLARY SANTOS, FEMUR, TROCHANTER;  Surgeon: Eulalio De La Cruz MD;  Location: [facility];  Service: Orthopedics;  Laterality: Left;    SPINAL CORD STIMULATOR IMPLANT  09/18/2013    and removal    SPINE SURGERY  2006    lumbar L2-S1  decompression.    SPINE SURGERY      cervical decompression    TONSILLECTOMY       Social History     Tobacco Use    Smoking status: Never Smoker    Smokeless tobacco: Never Used   Substance Use Topics    Alcohol use: No        Family History   Problem Relation Age of Onset    Diabetes Mother     Hypertension Mother     Irritable bowel syndrome Mother     Diabetes Father         insulin dependent    Hypertension Father     Heart disease Father     Coronary artery disease Father     Depression Father     Diabetes Sister     Diabetes Brother     COPD Brother        Pertinent medications noted:     Review of Systems:   Alert and awake, feeling better. No acute complaints.    EXAM & DIAGNOSTICS REVIEWED:   Vitals:     Temp:  [97.6 °F (36.4 °C)-98.8 °F (37.1 °C)]   Temp: 98.8 °F (37.1 °C) (06/16/22 0745)  Pulse: 109 (06/16/22 0504)  Resp: (!) 21 (06/16/22 0834)  BP: (!) 93/55 (06/16/22 0504)  SpO2: 96 % (06/16/22 0504)    Intake/Output Summary (Last 24 hours) at 6/16/2022 0838  Last data filed at 6/16/2022 0758  Gross per 24 hour   Intake 1806.32 ml   Output 1400 ml   Net 406.32 ml       General:  Frail, pale lady, in NAD, alert, awake, comfortable on room air   Eyes:  Anicteric, PERRL  ENT:  Moist oral mucosa, no thrush  Neck:  Supple  Lungs: Clear to auscultation b/l  Heart:  Tachycardic S1/S2+, regular rhythm, no murmurs  Abd:  Reducible umbilical hernia, soft, non tender, +BS  :  Boyd, yellow urine  Musc:  Joints without effusion, swelling,  erythema, synovitis  Skin:  Pale, warm, no rash  Wound:   Neuro: Awake, alert, following commands, moving all extremities, no acute focal deficit    Psych:  Calm   Lymphatic:       Extrem: No LE edema b/l  VAD:  R IJ     Peripheral IV       Isolation: None      General Labs reviewed:  Recent Labs   Lab 06/14/22  2344 06/15/22  0319 06/16/22  0445   WBC 10.50 5.98 8.91   HGB 11.3* 11.2* 8.8*   HCT 35.7* 35.9* 27.0*    197 171       Recent Labs   Lab  06/14/22  2345 06/15/22  0319 06/16/22  0444    139 139   K 4.9 4.7 3.6   * 110 109   CO2 18* 16* 21*   BUN 31* 28* 30*   CREATININE 1.9* 1.3 0.8   CALCIUM 9.2 8.6* 8.0*   PROT 6.1 5.5* 4.5*   BILITOT 0.4 0.5 0.6   ALKPHOS 100 87 66   ALT 18 19 15   AST 35 37 23     No results for input(s): CRP in the last 168 hours.  No results for input(s): SEDRATE in the last 168 hours.    Estimated Creatinine Clearance: 65.9 mL/min (based on SCr of 0.8 mg/dL).     Micro:  Microbiology Results (last 7 days)     Procedure Component Value Units Date/Time    Blood culture x two cultures. Draw prior to antibiotics. [[id number]] Collected: 06/14/22 2358    Order Status: Completed Specimen: Blood from Peripheral, Right Updated: 06/15/22 1715     Blood Culture, Routine No Growth to date    Narrative:      Aerobic and anaerobic    Blood culture x two cultures. Draw prior to antibiotics. [[id number]] Collected: 06/14/22 2357    Order Status: Completed Specimen: Blood Updated: 06/15/22 1715     Blood Culture, Routine No Growth to date    Narrative:      Aerobic and anaerobic        Pathology:   Diagnosis 1. Gastric ulcer, biopsy:       -  Ulcerated gastric antral mucosa in a background of marked reactive   gastropathy       -  Immunohistochemical stain with appropriate control is negative for   Helicobacter pylori   Comment:  The specimen shows foveolar hyperplasia with associated lamina   propria edema and vascular ectasia, consistent with reactive gastropathy.   This pattern of injury is often seen in the setting of bile reflux, alcohol   use, stress ulceration and NSAID/aspirin use. There is no evidence of atrophy   or intestinal metaplasia.  The specimen is negative for dysplasia or   malignancy.   2. Gastric antrum and body, biopsy:       -  Gastric antral and oxyntic mucosa with findings of reactive   gastropathy       -  Immunohistochemical stain with appropriate control is  POSITIVE  for   Helicobacter pylori   Comment:   The specimen shows foveolar hyperplasia with associated lamina   propria edema and vascular ectasia, consistent with reactive gastropathy.   This pattern of injury is often seen in the setting of bile reflux, alcohol   use, stress ulceration and NSAID/aspirin use. There is no evidence of atrophy   or intestinal metaplasia.  The specimen is negative for dysplasia or   malignancy.      Imaging Reviewed:  CXR  Ct abdomen/pelvis w/ contrast:   Fluid-filled distension of most of the colon with stool noted in the distal sigmoid colon and rectum may represent a fecal impaction/severe constipation.  Small-bowel obstruction is not seen.  Moderate size hiatal hernia.  Mild atelectasis at the left lung base.  Prior hysterectomy.  Prior ORIF of the left hip.  Lumbar scoliosis and degenerative disc disease at every level.  Gallbladder distention is now seen although a stone or gallbladder wall thickening is not seen.  Small umbilical hernia containing fluid and fat.   CT abdomen/pelvis w/o contrast: The colon is full of stool and dilated down to the descending colon.  There is also stool noted in the rectum.  This may represent constipation.  Free fluid or free air is not seen.  Constipation.  Prior hysterectomy.  3 mm left intrarenal stone.  Prior granulomatous disease.  Moderate size hiatal hernia. Lumbar scoliosis.  Degenerative disc disease at every level of the lumbar spine.  Prior ORIF left hip.      Cardiology:       IMPRESSION & PLAN     1. Hypovolemic shock resolved, likely secondary to significant constipation in the setting of chronic opiates    Lactic acid 4-->1.1   Procalcitonin 8.98-->10.1   Blood culturese x 2 no growth to date, pending final    UA negative    2. Deion 1.9->1.3-->0.8, resolved  3. PMHx:  HTN, diverticulosis, irritable bowel syndrome, chronic anemia due to iron and B12 deficiency, epilepsy, chronic back pain and PUD - last gastric biopsy +H pylori      Recommendations:  Dc Vancomycin   Continue  Zosyn 4.5 g IV q.8  Fluconazole 200 mg IV can switch to PO  Follow cultures   Aspiration precautions  Incentive spirometry    PT/OT     Will follow     D/w Dr Vargas     Medical Decision Making during this encounter was  [_] Low Complexity  [_] Moderate Complexity  [xx] High Complexity

## 2022-06-16 NOTE — PROGRESS NOTES
[facility]  Progress Note  Date: 2022 1:19 PM            Patient Name: Froilan Ray   MRN: [id number]   : 1945    AGE: 76 y.o.    LOS: 1 days Hospital Day: 3  Admit date: 2022 11:28 PM         HPI per EMR: Froilan Ray is a 76 y.o. female with a history of presents emergency room for evaluation of nausea, vomiting, abdominal pain, and altered mental status.  EMS reported to ER staff that upon their arrival patient's blood pressure was 74 systolic.  At the time of my exam patient was altered.  No family available at bedside.  All information was obtained from [facility] medical records as well as ER physician.  Previous medical history includes left hip fracture, drug induced constipation, hypertension, iron deficiency anemia, and seizure disorder.  ER workup:  CBC with mild anemia.  CMP with BUN of 31 creatinine 1.9 with bicarb of 18. Initial lactic acid was 1.4.  Second lactic acid was 3.6.  ABG with pH of 7.3, PO2 of 65, bicarb 19, pCO2 of 38. CT of the head was negative for any acute findings.  CT abdomen and pelvis demonstrates a colonic obstruction secondary to constipation of the rectosigmoid junction.  Patient was started on septic protocol.  Patient was given IV fluids.  Patient started on  vancomycin and Zosyn.  Patient admitted to Hospital Medicine for observation management.  Patient be placed in ICU.        Neurology consult: Patient was seen and examined by me in the ICU. She is drowsy however wakes up to stimulus and able to answer to questions however she is slow to respond. She states that she had abdominal pain for last couple days for which she presented to the hospital.      She denies any headache, vision loss, speech deficits, unilateral weakness or sensory changes.      In the ER, she was Hypotensive, tachycardic, afebrile, with acute abdomen- pain. Labs with white count of 10.5, PMN 87.6%, bands of 7%, H&H 11.3/35.7, platelet count 238. PEDRO,  creatinine 1.9, normal LFTs  Lactic acid 4. She was admitted to ICU for sepsis and encephalopathy       06/16/2022: No acute events overnight. Patient was seen and examined by me this morning. Neuro exam is normal this morning.  Mental status is normal and she is awake and answering to questions briskly.  States that her abdominal pain is better.       Vitals:  Patient Vitals for the past 24 hrs:   BP Temp Temp src Pulse Resp SpO2 Weight   06/16/22 0834 -- -- -- -- (!) 21 -- --   06/16/22 0745 -- 98.8 °F (37.1 °C) Oral -- -- -- --   06/16/22 0600 -- -- -- -- -- -- 80.2 kg (176 lb 12.9 oz)   06/16/22 0504 (!) 93/55 -- -- 109 19 96 % --   06/16/22 0337 -- -- -- -- -- 98 % --   06/16/22 0301 (!) 104/59 98 °F (36.7 °C) Oral (!) 115 (!) 27 99 % --   06/16/22 0259 -- -- -- -- (!) 22 -- --   06/16/22 0109 -- -- -- (!) 114 (!) 24 98 % --   06/15/22 2301 (!) 121/56 97.6 °F (36.4 °C) Axillary (!) 111 (!) 22 98 % --   06/15/22 2133 -- -- -- 104 (!) 21 99 % --   06/15/22 1928 -- -- -- 102 19 98 % --   06/15/22 1901 (!) 102/55 98.1 °F (36.7 °C) Oral 103 18 97 % --   06/15/22 1800 (!) 104/50 -- -- 108 (!) 21 98 % --   06/15/22 1702 -- -- -- -- (!) 26 -- --   06/15/22 1700 122/60 -- -- 109 (!) 25 97 % --   06/15/22 1655 -- -- -- 110 (!) 26 98 % --   06/15/22 1600 127/61 98.4 °F (36.9 °C) Oral 109 (!) 28 96 % --   06/15/22 1500 137/67 -- -- (!) 115 (!) 27 97 % --   06/15/22 1400 139/67 -- -- 110 (!) 26 96 % --     PHYSICAL EXAM:     GENERAL APPEARANCE: Well-developed, well-nourished female in no acute distress.  HEENT: Normocephalic and atraumatic. PERRL. Oropharynx unremarkable.  PULM: Comfortable on room air.  CV: RRR.  ABDOMEN: Soft, nontender.  EXTREMITIES: No signs of vascular compromise. Pulses present. No cyanosis, clubbing or edema.  SKIN: Clear; no rashes, lesions or skin breaks in exposed areas.      NEURO:   MENTAL STATUS: Patient awake and oriented to time, place, and person. Affect normal.  CRANIAL NERVES II-XII:  Pupils equal, round and reactive to light. Extraocular movements full and intact. No facial asymmetry.  MOTOR: Normal bulk. Tone normal and symmetrical throughout.  No abnormal movements. No tremor.   Strength 5/5 throughout unless specified below.  REFLEXES: DTRs 2+; normal and symmetric throughout.   SENSATION: Sensation grossly intact to fine touch.  COORDINATION: Finger-to-nose normal for age and symmetric.  STATION: Romberg deferred.  GAIT: Deferred.    CURRENT SCHEDULED MEDICATIONS:   diltiaZEM        docusate sodium  100 mg Oral BID    fluconazole (DIFLUCAN) IV (PEDS and ADULTS)  200 mg Intravenous Q24H    metoprolol  5 mg Intravenous Once    metoprolol tartrate  25 mg Oral TID    mupirocin   Nasal BID    piperacillin-tazobactam (ZOSYN) IVPB  4.5 g Intravenous Q8H    polyethylene glycol  17 g Oral BID    sodium phosphates  1 enema Rectal Once    vancomycin (VANCOCIN) IVPB  1,500 mg Intravenous Q24H     CURRENT INFUSIONS:   sodium chloride 0.9% 10 mL/hr at 06/16/22 0700    sodium chloride 0.9% Stopped (06/16/22 1047)    sodium chloride 0.9% 100 mL/hr at 06/16/22 1046    amiodarone in dextrose 5% 1 mg/min (06/16/22 1139)    amiodarone in dextrose 5%       DATA:  Recent Labs   Lab 06/14/22  2345 06/15/22  0319 06/15/22  0705 06/16/22  0444    139  --  139   K 4.9 4.7  --  3.6   * 110  --  109   CO2 18* 16*  --  21*   BUN 31* 28*  --  30*   CREATININE 1.9* 1.3  --  0.8    126*  --  118*   CALCIUM 9.2 8.6*  --  8.0*   PHOS  --  4.4  --  3.3   MG  --  2.1  --  2.1   AST 35 37  --  23   ALT 18 19  --  15   AMMONIA  --   --  36  --      Recent Labs   Lab 06/14/22  2344 06/15/22  0319 06/16/22  0445   WBC 10.50 5.98 8.91   HGB 11.3* 11.2* 8.8*   HCT 35.7* 35.9* 27.0*    197 171     No results found for: PROTEINCSF, GLUCCSF, WBCCSF, RBCCSF  No results found for: HGBA1C         I have personally reviewed and interpreted the pertinent imaging and lab results.  Imaging Results           CT Abdomen Pelvis With Contrast (Final result)  Result time 06/15/22 09:39:01   Procedure changed from CT Abdomen Pelvis  Without Contrast     Final result by Rick Berrios Jr., MD (06/15/22 09:39:01)                 Impression:      Fluid-filled distension of most of the colon with stool noted in the distal sigmoid colon and rectum may represent a fecal impaction/severe constipation.  Small-bowel obstruction is not seen.  Moderate size hiatal hernia.  Mild atelectasis at the left lung base.  Prior hysterectomy.  Prior ORIF of the left hip.  Lumbar scoliosis and degenerative disc disease at every level.  Gallbladder distention is now seen although a stone or gallbladder wall thickening is not seen.  Small umbilical hernia containing fluid and fat.      Electronically signed by: Rick Berrios MD  Date:    06/15/2022  Time:    09:39             Narrative:    EXAMINATION:  CT ABDOMEN PELVIS WITH CONTRAST    CLINICAL HISTORY:  Abdominal pain, acute, nonlocalized;    TECHNIQUE:  Low dose axial images, sagittal and coronal reformations were obtained from the lung bases to the pubic symphysis following the IV administration of 75 mL of Omnipaque 350    COMPARISON:  CT abdomen of Yanira 15, 2020 at 00:19    FINDINGS:  There is mild atelectasis at the left lung base.  The liver is of normal size and CT density without focal defect.  The gallbladder is distended without CT evidence of stone.  The pancreas is severely fatty atrophy without edema or mass.  The spleen is of normal size and CT density.    The adrenal glands are not enlarged.  The kidneys are of normal size and contrast enhancement.  A 3 mm left intrarenal stone is seen without hydronephrosis.  No masses are noted.  The abdominal aorta and inferior vena cava are of normal caliber.    There is a moderate size hiatal hernia.  Small bowel dilatation or air-fluid levels are not seen.  There is an umbilical hernia which contains some fluid and fat.  There is  noted distention of the colon filled with fluid down to the sigmoid colon and rectum was is filled with stool.  This could represent a fecal impaction are constipation.  Free air in the abdomen is not seen.    The bladder is no empty with a Boyd catheter in place.  A uterus is not seen.  The patient has had prior ORIF of the left hip.                               X-Ray Chest AP Portable (Final result)  Result time 06/15/22 07:47:36    Final result by Rick Berrios Jr., MD (06/15/22 07:47:36)                 Impression:      No acute abnormality.      Electronically signed by: Rick Berrios MD  Date:    06/15/2022  Time:    07:47             Narrative:    EXAMINATION:  XR CHEST AP PORTABLE    CLINICAL HISTORY:  Sepsis;    TECHNIQUE:  Single frontal view of the chest was performed.    COMPARISON:  Chest of August 22, 2020    FINDINGS:  The lungs are clear, with normal appearance of pulmonary vasculature and no pleural effusion or pneumothorax.    The cardiac silhouette is normal in size. The hilar and mediastinal contours are unremarkable.    Bones are intact.                               CT Abdomen Pelvis  Without Contrast (Final result)  Result time 06/15/22 08:00:52   Procedure changed from CT Abdomen Pelvis With Contrast     Final result by Rick Berrios Jr., MD (06/15/22 08:00:52)                 Impression:      Constipation.  Prior hysterectomy.  3 mm left intrarenal stone.  Prior granulomatous disease.  Moderate size hiatal hernia.    Lumbar scoliosis.  Degenerative disc disease at every level of the lumbar spine.  Prior ORIF left hip.      Electronically signed by: Rick Berrios MD  Date:    06/15/2022  Time:    08:00             Narrative:    EXAMINATION:  CT ABDOMEN PELVIS WITHOUT CONTRAST    CLINICAL HISTORY:  GI bleed;    TECHNIQUE:  Low dose axial images, sagittal and coronal reformations were obtained from the lung bases to the pubic symphysis.    COMPARISON:  CT abdomen and pelvis of  November 15, 2020 at    FINDINGS:  The liver is of normal size contour and CT density without focal mass.  A single calcified granuloma is seen near the falciform ligament.  The gallbladder is of normal size without CT evidence of stone.  The pancreas is fatty atrophied without a focal mass or edema is seen.  The spleen is small and of normal CT density.  Calcified granuloma is noted in the left lower lobe.    The adrenal glands are not enlarged.  The kidneys are of normal size contour and CT density.  There is a 3 mm calcification in the lower pole calyx of the left kidney without hydronephrosis or mass.  The abdominal aorta and inferior vena cava are of normal caliber.    There is a moderate size hiatal hernia.  There is an umbilical hernia containing fat and no bowel loop.  Small bowel distention or air-fluid levels are not seen.  The colon is full of stool and dilated down to the descending colon.  There is also stool noted in the rectum.  This may represent constipation.  Free fluid or free air is not seen.    The bladder is of normal contour without mass or asymmetry.  A uterus is not seen.  The patient has had left hip ORIF without metal artifact noted.  Deformity of the right side of the pubic symphysis may be due to old fracture.    The patient is seen to have lumbar scoliosis convex to the left and degenerative disc disease at every level of the lumbar spine.                               CT Head Without Contrast (Final result)  Result time 06/15/22 06:29:46    Final result by Mitul Cuellar MD (06/15/22 06:29:46)                 Impression:      1. There is no acute abnormality.  There is volume loss and nonspecific white matter change.  There is no acute hemorrhage, mass effect or obvious acute infarction.  It should be noted that MRI is more sensitive in the detection of subtle or acute nonhemorrhagic ischemic disease.  Note: Preliminary results were provided by Dr. Otoniel Silva ([facility]).  There is  no significant discrepancy.      Electronically signed by: Mitul Cuellar MD  Date:    06/15/2022  Time:    06:29             Narrative:    EXAMINATION:  CT HEAD WITHOUT CONTRAST    CLINICAL HISTORY:  Mental status change, unknown cause;    TECHNIQUE:  Routine unenhanced axial images were obtained through the head.  Sagittal and coronal reformatted images were created.  The study is reviewed in bone and soft tissue windows.    COMPARISON:  Head CT dated 10/04/2019    FINDINGS:  Intracranial contents: There is no acute abnormality or detectable change in the appearance of the brain compared to the prior study.  There is generalized volume loss and nonspecific white matter change.  There is no intracranial hemorrhage, mass or obvious acute infarction.  The gray-white interface is preserved.  There is no midline shift.  There is no abnormal extra-axial fluid collection.  The basilar cisterns are open.  The cerebellar tonsils are normal position.  The patient has had prior left lens replacement surgery.  The orbits are otherwise grossly normal.    Extracranial contents, calvarium, soft tissues: The calvarium is normal.  There are changes of hyperostosis frontalis interna.  The nasal septum is deviated to the left.  The paranasal sinuses and mastoid air cells are grossly clear.  There is degeneration at the TMJ bilaterally.                                        ASSESSMENT AND PLAN:    Encephalopathy     Plan:  · Etiology of encephalopathy is likely secondary to underlying systemic infection/metabolic derangements.  Her mental status is improved and she is currently alert and oriented x3 and responds briskly.  · CTH was done showed no acute abnormalities.   · Gen Surgery consulted and recommended no surgical intervention at this time. She is getting scheduled enemas.  · Mental status improved and she is currently back to her normal mental status. If any worsening in mental status, will consider MRI brain and EEG.    · Neuro checks per unit protocol  · Delirium precautions.   · ID on board for infectious  work up and management.   · No further neurological workup needed at this time.  Will follow patient as needed.  Please call with any questions            37 minutes of care time has been spent evaluating with the patient. Time includes chart review not limited to diagnostic imaging, labs, and vitals, patient assessment, discussion with family and nursing, current order evaluations, and new order entries.      Moe Schuster MD  Neurology/vascular Neurology  Date of Service: 06/16/2022  1:19 PM    Please note: This note was transcribed using voice recognition software. Because of this technology there are often uinintended grammatical, spelling, and other transcription errors. Please disregard these errors.

## 2022-06-16 NOTE — PT/OT/SLP PROGRESS
Occupational Therapy      Patient Name:  Froilan Ray   MRN:  [id number]    Patient not seen today secondary to Other (Comment) (OT evaluation attempted at 12:00; RNEliseo in isolation room, so OTR communicating with charge nurse, Jerri; per Ean, patient hold due to A-fib and V-tach - HR ranging between 175 and 192 at time of attempt. Will re-attempt 6/17/2022.)    6/16/2022

## 2022-06-16 NOTE — PROGRESS NOTES
Pharmacokinetic Assessment Follow Up: IV Vancomycin    Vancomycin serum concentration assessment(s):    The random level was drawn correctly and can be used to guide therapy at this time. The measurement is below the desired definitive target range of 15 to 20 mcg/mL.    Vancomycin Regimen Plan:    Change regimen to Vancomycin 1500 mg IV every 24 hours with next serum trough concentration measured at 1900 prior to 3rd dose on 6-18    Drug levels (last 3 results):  Recent Labs   Lab Result Units 06/15/22  1758 06/16/22  1153   Vancomycin, Random ug/mL 11.5 13.6       Pharmacy will continue to follow and monitor vancomycin.    Please contact pharmacy at extension [phone] for questions regarding this assessment.    Thank you for the consult,   Brett Marcelino       Patient brief summary:  Froilan Ray is a 76 y.o. female initiated on antimicrobial therapy with IV Vancomycin for treatment of bacteremia    Drug Allergies:   Review of patient's allergies indicates:  No Known Allergies    Actual Body Weight:   80.2 kg    Renal Function:   Estimated Creatinine Clearance: 65.9 mL/min (based on SCr of 0.8 mg/dL).,     Dialysis Method (if applicable):  N/A    CBC (last 72 hours):  Recent Labs   Lab Result Units 06/14/22  2344 06/15/22  0319 06/16/22  0445   WBC K/uL 10.50 5.98 8.91   Hemoglobin g/dL 11.3* 11.2* 8.8*   Hematocrit % 35.7* 35.9* 27.0*   Platelets K/uL 238 197 171   Gran % % 87.6* 84.0* 62.0   Lymph % % 4.4* 8.0* 15.0*   Mono % % 7.0 1.0* 5.0   Eosinophil % % 0.3 0.0 0.0   Basophil % % 0.2 0.0 0.0   Differential Method  Automated Manual Manual       Metabolic Panel (last 72 hours):  Recent Labs   Lab Result Units 06/14/22  2345 06/15/22  0215 06/15/22  0319 06/15/22  0450 06/16/22  0444   Sodium mmol/L 142  --  139  --  139   Potassium mmol/L 4.9  --  4.7  --  3.6   Chloride mmol/L 111*  --  110  --  109   CO2 mmol/L 18*  --  16*  --  21*   Glucose mg/dL 107  --  126*  --  118*   Glucose, UA   --  Negative  --   --    --    BUN mg/dL 31*  --  28*  --  30*   Creatinine mg/dL 1.9*  --  1.3  --  0.8   Creatinine, Urine mg/dL  --   --   --  19.4  --    Albumin g/dL 3.4*  --  2.9*  --  2.1*   Total Bilirubin mg/dL 0.4  --  0.5  --  0.6   Alkaline Phosphatase U/L 100  --  87  --  66   AST U/L 35  --  37  --  23   ALT U/L 18  --  19  --  15   Magnesium mg/dL  --   --  2.1  --  2.1   Phosphorus mg/dL  --   --  4.4  --  3.3       Vancomycin Administrations:  vancomycin given in the last 96 hours                     vancomycin 1.5 g in dextrose 5 % 250 mL IVPB (ready to mix) (mg) 1,500 mg New Bag 06/15/22 2005    vancomycin (VANCOCIN) 2,000 mg in dextrose 5 % 500 mL IVPB (mg) 2,000 mg New Bag 06/15/22 0015                    Microbiologic Results:  Microbiology Results (last 7 days)       Procedure Component Value Units Date/Time    Blood culture x two cultures. Draw prior to antibiotics. [[id number]] Collected: 06/14/22 2357    Order Status: Completed Specimen: Blood Updated: 06/16/22 1212     Blood Culture, Routine No Growth to date      No Growth to date    Narrative:      Aerobic and anaerobic    Blood culture x two cultures. Draw prior to antibiotics. [[id number]] Collected: 06/14/22 0947    Order Status: Completed Specimen: Blood from Peripheral, Right Updated: 06/16/22 1212     Blood Culture, Routine No Growth to date      No Growth to date    Narrative:      Aerobic and anaerobic

## 2022-06-16 NOTE — NURSING
The pt was AAO X 4 for the entire shift. She c/o a stomach ache, and asked for pain medication one time throughout the shift. She has had 7 liquid BM's, and slept most of the night. Her V/S are WNL, And 0 signs of distress or discomfort is noted..

## 2022-06-16 NOTE — PROGRESS NOTES
Pharmacokinetic Assessment Follow Up: IV Vancomycin    Vancomycin serum concentration assessment(s):    The random level was drawn correctly and can be used to guide therapy at this time. The measurement is below the desired definitive target range of 15 to 20 mcg/mL.    Vancomycin Regimen Plan:    Change regimen to Vancomycin 1500 mg IV pulse dose with next serum trough concentration measured at 1230 prior to next dose on 6/16    Drug levels (last 3 results):  Recent Labs   Lab Result Units 06/15/22  1758   Vancomycin, Random ug/mL 11.5       Pharmacy will continue to follow and monitor vancomycin.    Please contact pharmacy at extension [phone] for questions regarding this assessment.    Thank you for the consult,   Brett Marcelino       Patient brief summary:  Froilan Ray is a 76 y.o. female initiated on antimicrobial therapy with IV Vancomycin for treatment of bacteremia        Drug Allergies:   Review of patient's allergies indicates:  No Known Allergies    Actual Body Weight:   79.8 kg    Renal Function:   Estimated Creatinine Clearance: 40.5 mL/min (based on SCr of 1.3 mg/dL).,     Dialysis Method (if applicable):  N/A    CBC (last 72 hours):  Recent Labs   Lab Result Units 06/14/22  2344 06/15/22  0319   WBC K/uL 10.50 5.98   Hemoglobin g/dL 11.3* 11.2*   Hematocrit % 35.7* 35.9*   Platelets K/uL 238 197   Gran % % 87.6* 84.0*   Lymph % % 4.4* 8.0*   Mono % % 7.0 1.0*   Eosinophil % % 0.3 0.0   Basophil % % 0.2 0.0   Differential Method  Automated Manual       Metabolic Panel (last 72 hours):  Recent Labs   Lab Result Units 06/14/22  2345 06/15/22  0215 06/15/22  0319 06/15/22  0450   Sodium mmol/L 142  --  139  --    Potassium mmol/L 4.9  --  4.7  --    Chloride mmol/L 111*  --  110  --    CO2 mmol/L 18*  --  16*  --    Glucose mg/dL 107  --  126*  --    Glucose, UA   --  Negative  --   --    BUN mg/dL 31*  --  28*  --    Creatinine mg/dL 1.9*  --  1.3  --    Creatinine, Urine mg/dL  --   --   --  19.4    Albumin g/dL 3.4*  --  2.9*  --    Total Bilirubin mg/dL 0.4  --  0.5  --    Alkaline Phosphatase U/L 100  --  87  --    AST U/L 35  --  37  --    ALT U/L 18  --  19  --    Magnesium mg/dL  --   --  2.1  --    Phosphorus mg/dL  --   --  4.4  --        Vancomycin Administrations:  vancomycin given in the last 96 hours                     vancomycin (VANCOCIN) 2,000 mg in dextrose 5 % 500 mL IVPB (mg) 2,000 mg New Bag 06/15/22 0015                    Microbiologic Results:  Microbiology Results (last 7 days)       Procedure Component Value Units Date/Time    Blood culture x two cultures. Draw prior to antibiotics. [[id number]] Collected: 06/14/22 2358    Order Status: Completed Specimen: Blood from Peripheral, Right Updated: 06/15/22 1715     Blood Culture, Routine No Growth to date    Narrative:      Aerobic and anaerobic    Blood culture x two cultures. Draw prior to antibiotics. [[id number]] Collected: 06/14/22 2357    Order Status: Completed Specimen: Blood Updated: 06/15/22 1715     Blood Culture, Routine No Growth to date    Narrative:      Aerobic and anaerobic

## 2022-06-16 NOTE — PROGRESS NOTES
[facility] Hospital Medicine  Progress Note    Patient Name: Froilan Ray  MRN: [id number]  Patient Class: IP- Inpatient   Admission Date: 6/14/2022  Length of Stay: 1 days  Attending Physician: Tamara Vargas MD  Primary Care Provider: Primary Doctor No        Subjective:     Principal Problem:Encephalopathy, metabolic        HPI:  Froilan Ray is a 76-year-old female who presents emergency room for evaluation of nausea, vomiting, abdominal pain, and altered mental status.  EMS reported to ER staff that upon their arrival patient's blood pressure was 74 systolic.  At the time of my exam patient was altered.  No family available at bedside.  All information was obtained from [facility] medical records as well as ER physician.  Previous medical history includes left hip fracture, drug induced constipation, hypertension, iron deficiency anemia, and seizure disorder.  ER workup:  CBC with mild anemia.  CMP with BUN of 31 creatinine 1.9 with bicarb of 18. Initial lactic acid was 1.4.  Second lactic acid was 3.6.  ABG with pH of 7.3, PO2 of 65, bicarb 19, pCO2 of 38. CT of the head was negative for any acute findings.  CT abdomen and pelvis demonstrates a colonic obstruction secondary to constipation of the rectosigmoid junction.  Patient was started on septic protocol.  Patient was given IV fluids.  Patient started on  vancomycin and Zosyn.  Patient admitted to Hospital Medicine for observation management.  Patient be placed in ICU.  Will consult Neurology as well as GI.      Overview/Hospital Course:  No notes on file    Interval History: Patient seen and examined. Looks much better today. Many BMs overnight, KUB improved, change to oral bowel regimen. Afib RVR this AM, Cardiology consulted and started amiodarone. Did not recommend anticoagulation given anemia history    Review of Systems   Constitutional:  Negative for chills and fever.   HENT:  Negative for congestion and rhinorrhea.     Respiratory:  Negative for cough, shortness of breath and wheezing.    Cardiovascular:  Negative for chest pain and leg swelling.   Gastrointestinal:  Positive for abdominal pain (mild). Negative for abdominal distention, nausea and vomiting.   Endocrine: Negative for cold intolerance and heat intolerance.   Genitourinary:  Negative for dysuria and urgency.   Musculoskeletal:  Negative for arthralgias and neck stiffness.   Skin:  Negative for rash and wound.   Neurological:  Negative for dizziness and weakness.   Objective:     Vital Signs (Most Recent):  Temp: 98.8 °F (37.1 °C) (06/16/22 0745)  Pulse: 109 (06/16/22 0504)  Resp: (!) 21 (06/16/22 0834)  BP: (!) 93/55 (06/16/22 0504)  SpO2: 96 % (06/16/22 0504)   Vital Signs (24h Range):  Temp:  [97.6 °F (36.4 °C)-98.8 °F (37.1 °C)] 98.8 °F (37.1 °C)  Pulse:  [102-115] 109  Resp:  [18-28] 21  SpO2:  [93 %-99 %] 96 %  BP: ()/(50-67) 93/55     Weight: 80.2 kg (176 lb 12.9 oz)  Body mass index is 28.54 kg/m².    Intake/Output Summary (Last 24 hours) at 6/16/2022 1201  Last data filed at 6/16/2022 0758  Gross per 24 hour   Intake 569.82 ml   Output 1400 ml   Net -830.18 ml      Physical Exam  Vitals and nursing note reviewed.   Constitutional:       General: She is not in acute distress.     Appearance: She is well-developed. She is not diaphoretic.   HENT:      Head: Normocephalic.      Mouth/Throat:      Mouth: Mucous membranes are dry.   Eyes:      General: No scleral icterus.     Conjunctiva/sclera: Conjunctivae normal.      Pupils: Pupils are equal, round, and reactive to light.   Neck:      Vascular: No JVD.   Cardiovascular:      Rate and Rhythm: Tachycardia present. Rhythm irregular.      Heart sounds: Normal heart sounds. No murmur heard.    No friction rub. No gallop.   Pulmonary:      Effort: Pulmonary effort is normal. No respiratory distress.      Breath sounds: Normal breath sounds. No wheezing or rales.   Abdominal:      General: Bowel sounds are  normal. There is no distension.      Palpations: Abdomen is soft.      Tenderness: There is abdominal tenderness (mild). There is no guarding or rebound.   Musculoskeletal:         General: No tenderness. Normal range of motion.      Cervical back: Normal range of motion and neck supple.   Lymphadenopathy:      Cervical: No cervical adenopathy.   Skin:     General: Skin is warm and dry.      Capillary Refill: Capillary refill takes less than 2 seconds.      Coloration: Skin is pale.      Findings: No erythema or rash.   Neurological:      Mental Status: She is oriented to person, place, and time.       Significant Labs: All pertinent labs within the past 24 hours have been reviewed.    Significant Imaging: I have reviewed all pertinent imaging results/findings within the past 24 hours.        Assessment/Plan:      * Encephalopathy, metabolic  Acute problem  Improving  Neurology following        Atrial fibrillation with RVR  Monitor   Amiodarone per Cardiology  No anticoagulation despite CHADsVasc 2 per cardiology 2/2 anemia history       PEDRO (acute kidney injury)  Acute problem  Patient with acute kidney injury likely d/t IVVD/Dehydration  caused by dehydration. PEDRO is currently improving. Labs reviewed- Renal function/electrolytes with Estimated Creatinine Clearance: 65.9 mL/min (based on SCr of 0.8 mg/dL). according to latest data. Monitor urine output and serial BMP and adjust therapy as needed. Avoid nephrotoxins and renally dose meds for GFR listed above.       Sepsis  This patient does have evidence of infective focus  My overall impression is sepsis. Vital signs were reviewed and noted in progress note.  Antibiotics given-   Antibiotics (From admission, onward)            Start     Stop Route Frequency Ordered    06/16/22 0945  mupirocin 2 % ointment         06/21 0859 Nasl 2 times daily 06/16/22 0835    06/15/22 0930  piperacillin-tazobactam 4.5 g in dextrose 5 % 100 mL IVPB (ready to mix system)         --  "IV Every 8 hours (non-standard times) 06/15/22 0402    06/15/22 0502  vancomycin - pharmacy to dose  (vancomycin IVPB)        "And" Linked Group Details    -- IV pharmacy to manage frequency 06/15/22 0402        Cultures were taken-   Microbiology Results (last 7 days)     Procedure Component Value Units Date/Time    Blood culture x two cultures. Draw prior to antibiotics. [[id number]] Collected: 06/14/22 2358    Order Status: Completed Specimen: Blood from Peripheral, Right Updated: 06/15/22 1715     Blood Culture, Routine No Growth to date    Narrative:      Aerobic and anaerobic    Blood culture x two cultures. Draw prior to antibiotics. [[id number]] Collected: 06/14/22 2357    Order Status: Completed Specimen: Blood Updated: 06/15/22 1715     Blood Culture, Routine No Growth to date    Narrative:      Aerobic and anaerobic        Latest lactate reviewed, they are-  Recent Labs   Lab 06/15/22  0705 06/15/22  0959 06/15/22  1200   LACTATE 4.0* 2.9* 1.1       Organ dysfunction indicated by Acute kidney injury  Source- unknown    Source control Achieved by- Abx per ID      Iron deficiency anemia  Chronic problem  Received iron infusion outpatient day PTA  Monitor CBC closely      HTN (hypertension)  Chronic  Chronic, controlled.  Latest blood pressure and vitals reviewed-   Temp:  [97.6 °F (36.4 °C)-98.8 °F (37.1 °C)]   Pulse:  [102-115]   Resp:  [18-28]   BP: ()/(50-67)   SpO2:  [93 %-99 %] .   Home meds for hypertension were reviewed and noted below.   Hypertension Medications             amlodipine-benazepril 5-20 mg (LOTREL) 5-20 mg per capsule Take 1 capsule by mouth once daily.    furosemide (LASIX) 40 MG tablet Take 40 mg by mouth daily as needed.          While in the hospital, will manage blood pressure as follows; Continue home antihypertensive regimen    Will utilize p.r.n. blood pressure medication only if patient's blood pressure greater than  180/110 and she develops symptoms such as worsening " chest pain or shortness of breath.        VTE Risk Mitigation (From admission, onward)         Ordered     IP VTE HIGH RISK PATIENT  Once         06/15/22 0401     Place sequential compression device  Until discontinued         06/15/22 0401     Place CARI hose  Until discontinued         06/15/22 0401                Discharge Planning   BAILEY: 6/17/2022     Code Status: Full Code   Is the patient medically ready for discharge?:     Reason for patient still in hospital (select all that apply): Patient trending condition and Treatment  Discharge Plan A: Home with family            Critical care time spent on the evaluation and treatment of severe organ dysfunction, review of pertinent labs and imaging studies, discussions with consulting providers and discussions with patient/family: 45 minutes.    Discussed with Dr. Jason Vargas MD  Department of Hospital Medicine   [facility]

## 2022-06-16 NOTE — CONSULTS
[facility]  Adult Nutrition  Consult Note    SUMMARY    Intervention: collaboration with care providers     Recommendation:  1) Advance PO diet to goal of regular, textur per SLP   2) weigh weekly   3) Add Boost plus BID-vanilla if PO intakes are note 75% of meals or more at f/u    Goals: 1) PO dieta advanced in < 4 days  Nutrition Goal Status: new  Communication of RD Recs:  (POC, sticky note)    Assessment and Plan    Inadequate energy intake  R/t NPO, N/V/D  As evidenced by PO intakes < 50% of needs x 3 days   Intervention: above  new    Malnutrition Assessment     Skin (Micronutrient):  (Mitul = 13)   Micronutrient Evaluation: suspected deficiency  Micronutrient Evaluation Comments: check iron               Edema (Fluid Accumulation): 0-->no edema present   Subcutaneous Fat Loss (Final Summary): well nourished  Muscle Loss Evaluation (Final Summary): well nourished         Reason for Assessment    Reason For Assessment: consult  Diagnosis:  (metabolic encephalopathy)  Relevant Medical History: HTN, IBS, diverticulitis, constipation, iron deficiency anemia, seizures, DDD  Interdisciplinary Rounds: did not attend    General Information Comments: 77 y/o female admitted with AMS, N/V, diarrhea, and a stomach ache s/p a fall at home. s/p 7 liquid BMs last night, has not eaten in 2-3 days. NPO at this time. At visit pt could answer my questions appropriately, she is hungry. PTA typically eats well. NFPE done 6/16/22 no wasting seen. Wt gai per chart review.    Nutrition Discharge Planning: To be determined- Low sodium diet    Nutrition Risk Screen    Nutrition Risk Screen: no indicators present    Nutrition/Diet History    Patient Reported Diet/Restrictions/Preferences: general  Spiritual, Cultural Beliefs, Restorationist Practices, Values that Affect Care: no  Food Allergies: NKFA  Factors Affecting Nutritional Intake: impaired cognitive status/motor control, diarrhea, nausea/vomiting,  "NPO    Anthropometrics    Temp: 98.8 °F (37.1 °C)  Height Method: Measured (office 22)  Height: 5' 6"  Height (inches): 66 in  Weight Method: Bed Scale  Weight: 80.2 kg (176 lb 12.9 oz)  Weight (lb): 176.81 lb  Ideal Body Weight (IBW), Female: 130 lb  % Ideal Body Weight, Female (lb): 136.01 %  BMI (Calculated): 28.6  BMI Grade: 25 - 29.9 - overweight  Usual Body Weight (UBW), k.2 kg (10/20/21)  % Usual Body Weight: 104.1  % Weight Change From Usual Weight: 3.89 %       Lab/Procedures/Meds    Pertinent Labs Reviewed: reviewed  BMP  Lab Results   Component Value Date     2022    K 3.6 2022     2022    CO2 21 (L) 2022    BUN 30 (H) 2022    CREATININE 0.8 2022    CALCIUM 8.0 (L) 2022    ANIONGAP 9 2022    ESTGFRAFRICA >60 2022    EGFRNONAA >60 2022     Recent Labs   Lab 22  0848   POCTGLUCOSE 70     Lab Results   Component Value Date    ALBUMIN 2.1 (L) 2022       Pertinent Medications Reviewed: reviewed  Pertinent Medications Comments: polyethylene glycol, KNaPhos, zofran, Kbicarb    Estimated/Assessed Needs    Weight Used For Calorie Calculations: 80.2 kg (176 lb 12.9 oz)  Energy Calorie Requirements (kcal): MSJ ( x 1.2) = 1580 kcal  Energy Need Method: [place]  Protein Requirements: 1.2 g protein/kg ( 96 g)  Weight Used For Protein Calculations: 80.2 kg (176 lb 12.9 oz)  Fluid Requirements (mL): 1580 ml or per MD  Estimated Fluid Requirement Method: RDA Method  CHO Requirement: N/A      Nutrition Prescription Ordered    Current Diet Order: NPO x 1 day    Evaluation of Received Nutrient/Fluid Intake    Tolerance: other (see comments) (NPO)  % Intake of Estimated Energy Needs: 0%  % Meal Intake: NPO    Nutrition Risk    Level of Risk/Frequency of Follow-up: moderate 2 x weekly      Monitor and Evaluation    Food and Nutrient Intake: energy intake, food and beverage intake  Food and Nutrient Adminstration: diet " order  Anthropometric Measurements: weight  Biochemical Data, Medical Tests and Procedures: electrolyte and renal panel, gastrointestinal profile  Nutrition-Focused Physical Findings: overall appearance       Nutrition Follow-Up    RD Follow-up?: Yes

## 2022-06-17 LAB
ALBUMIN SERPL BCP-MCNC: 1.9 G/DL (ref 3.5–5.2)
ALP SERPL-CCNC: 81 U/L (ref 55–135)
ALT SERPL W/O P-5'-P-CCNC: 15 U/L (ref 10–44)
ANION GAP SERPL CALC-SCNC: 9 MMOL/L (ref 8–16)
AORTIC ROOT ANNULUS: 3.23 CM
AORTIC VALVE CUSP SEPERATION: 2.16 CM
APTT BLDCRRT: 50.2 SEC (ref 21–32)
ASCENDING AORTA: 3.51 CM
AST SERPL-CCNC: 21 U/L (ref 10–40)
AV INDEX (PROSTH): 0.93
AV MEAN GRADIENT: 4 MMHG
AV PEAK GRADIENT: 7 MMHG
AV VALVE AREA: 3.17 CM2
AV VELOCITY RATIO: 0.94
BASOPHILS # BLD AUTO: ABNORMAL K/UL (ref 0–0.2)
BASOPHILS NFR BLD: 0 % (ref 0–1.9)
BILIRUB SERPL-MCNC: 0.4 MG/DL (ref 0.1–1)
BSA FOR ECHO PROCEDURE: 1.93 M2
BUN SERPL-MCNC: 19 MG/DL (ref 8–23)
CALCIUM SERPL-MCNC: 8.1 MG/DL (ref 8.7–10.5)
CHLORIDE SERPL-SCNC: 109 MMOL/L (ref 95–110)
CO2 SERPL-SCNC: 20 MMOL/L (ref 23–29)
CREAT SERPL-MCNC: 0.6 MG/DL (ref 0.5–1.4)
CV ECHO LV RWT: 0.42 CM
DIFFERENTIAL METHOD: ABNORMAL
DOP CALC AO PEAK VEL: 1.36 M/S
DOP CALC AO VTI: 26.9 CM
DOP CALC LVOT AREA: 3.4 CM2
DOP CALC LVOT DIAMETER: 2.08 CM
DOP CALC LVOT PEAK VEL: 1.28 M/S
DOP CALC LVOT STROKE VOLUME: 85.25 CM3
DOP CALC MV VTI: 36.44 CM
DOP CALCLVOT PEAK VEL VTI: 25.1 CM
E WAVE DECELERATION TIME: 149.32 MSEC
E/A RATIO: 1.11
E/E' RATIO: 14.12 M/S
ECHO LV POSTERIOR WALL: 1.16 CM (ref 0.6–1.1)
EJECTION FRACTION: 50 %
EOSINOPHIL # BLD AUTO: ABNORMAL K/UL (ref 0–0.5)
EOSINOPHIL NFR BLD: 2 % (ref 0–8)
ERYTHROCYTE [DISTWIDTH] IN BLOOD BY AUTOMATED COUNT: 15.4 % (ref 11.5–14.5)
EST. GFR  (AFRICAN AMERICAN): >60 ML/MIN/1.73 M^2
EST. GFR  (NON AFRICAN AMERICAN): >60 ML/MIN/1.73 M^2
FRACTIONAL SHORTENING: 26 % (ref 28–44)
GLUCOSE SERPL-MCNC: 113 MG/DL (ref 70–110)
HCT VFR BLD AUTO: 27.4 % (ref 37–48.5)
HGB BLD-MCNC: 8.6 G/DL (ref 12–16)
IMM GRANULOCYTES # BLD AUTO: ABNORMAL K/UL (ref 0–0.04)
IMM GRANULOCYTES NFR BLD AUTO: ABNORMAL % (ref 0–0.5)
INTERVENTRICULAR SEPTUM: 1.19 CM (ref 0.6–1.1)
IVRT: 125.59 MSEC
LA MAJOR: 6.03 CM
LA MINOR: 5.99 CM
LA WIDTH: 4.7 CM
LEFT ATRIUM SIZE: 4.04 CM
LEFT ATRIUM VOLUME INDEX MOD: 51.4 ML/M2
LEFT ATRIUM VOLUME INDEX: 51.3 ML/M2
LEFT ATRIUM VOLUME MOD: 97.17 CM3
LEFT ATRIUM VOLUME: 97 CM3
LEFT INTERNAL DIMENSION IN SYSTOLE: 4.06 CM (ref 2.1–4)
LEFT VENTRICLE DIASTOLIC VOLUME INDEX: 76.99 ML/M2
LEFT VENTRICLE DIASTOLIC VOLUME: 145.51 ML
LEFT VENTRICLE MASS INDEX: 139 G/M2
LEFT VENTRICLE SYSTOLIC VOLUME INDEX: 38.4 ML/M2
LEFT VENTRICLE SYSTOLIC VOLUME: 72.51 ML
LEFT VENTRICULAR INTERNAL DIMENSION IN DIASTOLE: 5.47 CM (ref 3.5–6)
LEFT VENTRICULAR MASS: 262.3 G
LV LATERAL E/E' RATIO: 12 M/S
LV SEPTAL E/E' RATIO: 17.14 M/S
LYMPHOCYTES # BLD AUTO: ABNORMAL K/UL (ref 1–4.8)
LYMPHOCYTES NFR BLD: 4 % (ref 18–48)
MAGNESIUM SERPL-MCNC: 2.1 MG/DL (ref 1.6–2.6)
MCH RBC QN AUTO: 28.9 PG (ref 27–31)
MCHC RBC AUTO-ENTMCNC: 31.4 G/DL (ref 32–36)
MCV RBC AUTO: 92 FL (ref 82–98)
MONOCYTES # BLD AUTO: ABNORMAL K/UL (ref 0.3–1)
MONOCYTES NFR BLD: 6 % (ref 4–15)
MV A" WAVE DURATION": 8.37 MSEC
MV MEAN GRADIENT: 1 MMHG
MV PEAK A VEL: 1.08 M/S
MV PEAK E VEL: 1.2 M/S
MV PEAK GRADIENT: 6 MMHG
MV STENOSIS PRESSURE HALF TIME: 43.3 MS
MV VALVE AREA BY CONTINUITY EQUATION: 2.34 CM2
MV VALVE AREA P 1/2 METHOD: 5.08 CM2
NEUTROPHILS NFR BLD: 79 % (ref 38–73)
NEUTS BAND NFR BLD MANUAL: 9 %
NRBC BLD-RTO: 0 /100 WBC
OVALOCYTES BLD QL SMEAR: ABNORMAL
PHOSPHATE SERPL-MCNC: 1.9 MG/DL (ref 2.7–4.5)
PISA MRMAX VEL: 0.05 M/S
PISA TR MAX VEL: 2.47 M/S
PLATELET # BLD AUTO: 163 K/UL (ref 150–450)
PLATELET BLD QL SMEAR: ABNORMAL
PMV BLD AUTO: 10.3 FL (ref 9.2–12.9)
POCT GLUCOSE: 133 MG/DL (ref 70–110)
POCT GLUCOSE: 210 MG/DL (ref 70–110)
POCT GLUCOSE: 80 MG/DL (ref 70–110)
POIKILOCYTOSIS BLD QL SMEAR: SLIGHT
POLYCHROMASIA BLD QL SMEAR: ABNORMAL
POTASSIUM SERPL-SCNC: 3.3 MMOL/L (ref 3.5–5.1)
PROT SERPL-MCNC: 4.7 G/DL (ref 6–8.4)
PULM VEIN S/D RATIO: 1.78
PV PEAK D VEL: 0.4 M/S
PV PEAK S VEL: 0.71 M/S
PV PEAK VELOCITY: 0.97 CM/S
RA MAJOR: 4.62 CM
RA PRESSURE: 3 MMHG
RA WIDTH: 2.84 CM
RBC # BLD AUTO: 2.98 M/UL (ref 4–5.4)
RIGHT VENTRICULAR END-DIASTOLIC DIMENSION: 3.13 CM
RV TISSUE DOPPLER FREE WALL SYSTOLIC VELOCITY 1 (APICAL 4 CHAMBER VIEW): 19.83 CM/S
SINUS: 2.79 CM
SODIUM SERPL-SCNC: 138 MMOL/L (ref 136–145)
STJ: 2.78 CM
TDI LATERAL: 0.1 M/S
TDI SEPTAL: 0.07 M/S
TDI: 0.09 M/S
TR MAX PG: 24 MMHG
TRICUSPID ANNULAR PLANE SYSTOLIC EXCURSION: 2.5 CM
TV REST PULMONARY ARTERY PRESSURE: 27 MMHG
WBC # BLD AUTO: 8.8 K/UL (ref 3.9–12.7)

## 2022-06-17 PROCEDURE — 80053 COMPREHEN METABOLIC PANEL: CPT | Performed by: NURSE PRACTITIONER

## 2022-06-17 PROCEDURE — 99233 SBSQ HOSP IP/OBS HIGH 50: CPT | Mod: 25,,, | Performed by: SPECIALIST

## 2022-06-17 PROCEDURE — 97530 THERAPEUTIC ACTIVITIES: CPT

## 2022-06-17 PROCEDURE — 63600175 PHARM REV CODE 636 W HCPCS: Performed by: SPECIALIST

## 2022-06-17 PROCEDURE — 99233 SBSQ HOSP IP/OBS HIGH 50: CPT | Mod: S$GLB,,, | Performed by: STUDENT IN AN ORGANIZED HEALTH CARE EDUCATION/TRAINING PROGRAM

## 2022-06-17 PROCEDURE — 99900035 HC TECH TIME PER 15 MIN (STAT)

## 2022-06-17 PROCEDURE — 83735 ASSAY OF MAGNESIUM: CPT | Performed by: NURSE PRACTITIONER

## 2022-06-17 PROCEDURE — 12000002 HC ACUTE/MED SURGE SEMI-PRIVATE ROOM

## 2022-06-17 PROCEDURE — 85007 BL SMEAR W/DIFF WBC COUNT: CPT | Performed by: HOSPITALIST

## 2022-06-17 PROCEDURE — 25000003 PHARM REV CODE 250: Performed by: HOSPITALIST

## 2022-06-17 PROCEDURE — 25000003 PHARM REV CODE 250: Performed by: NURSE PRACTITIONER

## 2022-06-17 PROCEDURE — 85730 THROMBOPLASTIN TIME PARTIAL: CPT | Performed by: HOSPITALIST

## 2022-06-17 PROCEDURE — 94761 N-INVAS EAR/PLS OXIMETRY MLT: CPT

## 2022-06-17 PROCEDURE — 85027 COMPLETE CBC AUTOMATED: CPT | Performed by: HOSPITALIST

## 2022-06-17 PROCEDURE — 27000221 HC OXYGEN, UP TO 24 HOURS

## 2022-06-17 PROCEDURE — 84100 ASSAY OF PHOSPHORUS: CPT | Performed by: NURSE PRACTITIONER

## 2022-06-17 PROCEDURE — 99233 PR SUBSEQUENT HOSPITAL CARE,LEVL III: ICD-10-PCS | Mod: 25,,, | Performed by: SPECIALIST

## 2022-06-17 PROCEDURE — 97161 PT EVAL LOW COMPLEX 20 MIN: CPT

## 2022-06-17 PROCEDURE — 63600175 PHARM REV CODE 636 W HCPCS: Performed by: NURSE PRACTITIONER

## 2022-06-17 PROCEDURE — 25000003 PHARM REV CODE 250: Performed by: SPECIALIST

## 2022-06-17 PROCEDURE — 99233 PR SUBSEQUENT HOSPITAL CARE,LEVL III: ICD-10-PCS | Mod: S$GLB,,, | Performed by: STUDENT IN AN ORGANIZED HEALTH CARE EDUCATION/TRAINING PROGRAM

## 2022-06-17 RX ORDER — TALC
9 POWDER (GRAM) TOPICAL NIGHTLY PRN
Status: DISCONTINUED | OUTPATIENT
Start: 2022-06-18 | End: 2022-06-20 | Stop reason: HOSPADM

## 2022-06-17 RX ORDER — SODIUM,POTASSIUM PHOSPHATES 280-250MG
1 POWDER IN PACKET (EA) ORAL 2 TIMES DAILY
Status: DISPENSED | OUTPATIENT
Start: 2022-06-17 | End: 2022-06-19

## 2022-06-17 RX ORDER — AMIODARONE HYDROCHLORIDE 100 MG/1
200 TABLET ORAL DAILY
Status: DISCONTINUED | OUTPATIENT
Start: 2022-06-17 | End: 2022-06-20 | Stop reason: HOSPADM

## 2022-06-17 RX ADMIN — POTASSIUM & SODIUM PHOSPHATES POWDER PACK 280-160-250 MG 2 PACKET: 280-160-250 PACK at 09:06

## 2022-06-17 RX ADMIN — PIPERACILLIN SODIUM AND TAZOBACTAM SODIUM 4.5 G: 4; .5 INJECTION, POWDER, LYOPHILIZED, FOR SOLUTION INTRAVENOUS at 01:06

## 2022-06-17 RX ADMIN — OXYCODONE AND ACETAMINOPHEN 1 TABLET: 10; 325 TABLET ORAL at 08:06

## 2022-06-17 RX ADMIN — APIXABAN 2.5 MG: 2.5 TABLET, FILM COATED ORAL at 11:06

## 2022-06-17 RX ADMIN — MUPIROCIN: 20 OINTMENT TOPICAL at 09:06

## 2022-06-17 RX ADMIN — POTASSIUM & SODIUM PHOSPHATES POWDER PACK 280-160-250 MG 2 PACKET: 280-160-250 PACK at 01:06

## 2022-06-17 RX ADMIN — PIPERACILLIN SODIUM AND TAZOBACTAM SODIUM 4.5 G: 4; .5 INJECTION, POWDER, LYOPHILIZED, FOR SOLUTION INTRAVENOUS at 09:06

## 2022-06-17 RX ADMIN — POTASSIUM BICARBONATE 35 MEQ: 391 TABLET, EFFERVESCENT ORAL at 11:06

## 2022-06-17 RX ADMIN — METOPROLOL TARTRATE 25 MG: 25 TABLET, FILM COATED ORAL at 09:06

## 2022-06-17 RX ADMIN — APIXABAN 2.5 MG: 2.5 TABLET, FILM COATED ORAL at 08:06

## 2022-06-17 RX ADMIN — METOPROLOL TARTRATE 25 MG: 25 TABLET, FILM COATED ORAL at 02:06

## 2022-06-17 RX ADMIN — DIGOXIN 250 MCG: 250 INJECTION, SOLUTION INTRAMUSCULAR; INTRAVENOUS at 12:06

## 2022-06-17 RX ADMIN — POTASSIUM & SODIUM PHOSPHATES POWDER PACK 280-160-250 MG 1 PACKET: 280-160-250 PACK at 08:06

## 2022-06-17 RX ADMIN — DOCUSATE SODIUM 100 MG: 100 CAPSULE, LIQUID FILLED ORAL at 09:06

## 2022-06-17 RX ADMIN — OXYCODONE AND ACETAMINOPHEN 1 TABLET: 10; 325 TABLET ORAL at 03:06

## 2022-06-17 RX ADMIN — AMIODARONE HYDROCHLORIDE 200 MG: 200 TABLET ORAL at 11:06

## 2022-06-17 RX ADMIN — OXYCODONE AND ACETAMINOPHEN 1 TABLET: 10; 325 TABLET ORAL at 02:06

## 2022-06-17 RX ADMIN — SODIUM CHLORIDE: 0.9 INJECTION, SOLUTION INTRAVENOUS at 02:06

## 2022-06-17 RX ADMIN — POTASSIUM BICARBONATE 35 MEQ: 391 TABLET, EFFERVESCENT ORAL at 09:06

## 2022-06-17 RX ADMIN — METOPROLOL TARTRATE 25 MG: 25 TABLET, FILM COATED ORAL at 08:06

## 2022-06-17 RX ADMIN — AMIODARONE HYDROCHLORIDE 0.5 MG/MIN: 1.8 INJECTION, SOLUTION INTRAVENOUS at 05:06

## 2022-06-17 RX ADMIN — MELATONIN TAB 3 MG 9 MG: 3 TAB at 11:06

## 2022-06-17 RX ADMIN — OXYCODONE AND ACETAMINOPHEN 1 TABLET: 10; 325 TABLET ORAL at 09:06

## 2022-06-17 NOTE — NURSING
Pt with transfer orders to PCU,. Assigned to bed 224. Report received from Johanna ROMERO, agree with previous nurses assessment. Pt to be transferred via wheelchair. Will cont to monitor.

## 2022-06-17 NOTE — PLAN OF CARE
Problem: Physical Therapy  Goal: Physical Therapy Goal  Description: Goals to be met by: 22    Patient will increase functional independence with mobility by performin. Supine to sit with [place]  2. Sit to supine with [place]  3. Sit to stand transfer with Supervision  4. Bed to chair transfer with Supervision using Rolling Walker  5. Gait  x 150 feet with Supervision using Rolling Walker.     Outcome: Ongoing, Progressing

## 2022-06-17 NOTE — CONSULTS
[facility]  Cardiology  Progress Note    Patient Name: Froilan Ray  MRN: [id number]  Admission Date: 6/14/2022  Hospital Length of Stay: 2 days  Code Status: Full Code   Attending Physician: Tamara Vargas MD   Primary Care Physician: Primary Doctor No  Expected Discharge Date: 6/18/2022  Principal Problem:Encephalopathy, metabolic    Subjective:     Hospital Course:  Patient is in sinus rhythm  Interval History:  She is feeling better  Problem 1 atrial fibrillation-back in sinus rhythm after IV amiodarone Lanoxin  Rx p.o. amiodarone and continue metoprolol  2. Albumin iron ferritin potassium and phosphorus are  low  Replace potassium  Recommend several more doses of IV iron   ROS  Objective:     Vital Signs (Most Recent):  Temp: 98.6 °F (37 °C) (06/17/22 0400)  Pulse: 79 (06/17/22 0752)  Resp: 18 (06/17/22 0910)  BP: 128/60 (06/17/22 0910)  SpO2: 95 % (06/17/22 0752) Vital Signs (24h Range):  Temp:  [98 °F (36.7 °C)-98.7 °F (37.1 °C)] 98.6 °F (37 °C)  Pulse:  [] 79  Resp:  [17-48] 18  SpO2:  [81 %-100 %] 95 %  BP: ()/(54-78) 128/60     Weight: 79.8 kg (176 lb)  Body mass index is 28.41 kg/m².    SpO2: 95 %  O2 Device (Oxygen Therapy): nasal cannula      Intake/Output Summary (Last 24 hours) at 6/17/2022 1122  Last data filed at 6/17/2022 0700  Gross per 24 hour   Intake 3927.74 ml   Output 450 ml   Net 3477.74 ml       Lines/Drains/Airways     Peripheral Intravenous Line  Duration                Peripheral IV - Single Lumen 06/14/22 20 G Left Antecubital 3 days         Peripheral IV - Single Lumen 06/15/22 0120 18 G Anterior;Right 2 days         Peripheral IV - Single Lumen 06/16/22 1752 20 G Distal;Left;Posterior Forearm <1 day                Physical Exam   Lungs are clear  Cardiac regular  Abdomen bowel sounds present  Significant Labs:   CMP   Recent Labs   Lab 06/16/22  0444 06/17/22  0533    138   K 3.6 3.3*    109   CO2 21* 20*   * 113*   BUN 30* 19    CREATININE 0.8 0.6   CALCIUM 8.0* 8.1*   PROT 4.5* 4.7*   ALBUMIN 2.1* 1.9*   BILITOT 0.6 0.4   ALKPHOS 66 81   AST 23 21   ALT 15 15   ANIONGAP 9 9   ESTGFRAFRICA >60 >60   EGFRNONAA >60 >60   , CBC   Recent Labs   Lab 06/16/22  0445 06/16/22  1821 06/17/22  0533   WBC 8.91  --  8.80   HGB 8.8*  --  8.6*   HCT 27.0*   < > 27.4*     --  163    < > = values in this interval not displayed.    and Troponin No results for input(s): TROPONINI in the last 48 hours.    Significant Imaging:   Assessment and Plan:   His CV problems as enumerated above  Switch to p.o. medicines  Brief HPI:    Active Diagnoses:    Diagnosis Date Noted POA    PRINCIPAL PROBLEM:  Encephalopathy, metabolic [G93.41] 06/15/2022 Yes    Atrial fibrillation with RVR [I48.91] 06/16/2022 Yes    Sepsis [A41.9] 06/15/2022 Yes    PEDRO (acute kidney injury) [N17.9] 06/15/2022 Yes    Iron deficiency anemia [D50.9] 05/18/2022 Yes    HTN (hypertension) [I10] 12/11/2018 Yes      Problems Resolved During this Admission:       VTE Risk Mitigation (From admission, onward)         Ordered     apixaban tablet 2.5 mg  2 times daily         06/17/22 1035     IP VTE HIGH RISK PATIENT  Once         06/15/22 0401     Place sequential compression device  Until discontinued         06/15/22 0401     Place CARI hose  Until discontinued         06/15/22 0401                Primo Gamino MD  Cardiology  [facility]

## 2022-06-17 NOTE — ASSESSMENT & PLAN NOTE
Monitor   Amiodarone per Cardiology, transitioned to PO  Eliquis 2.5 mg BID per Caridology, ok per GI

## 2022-06-17 NOTE — PROGRESS NOTES
Consult Note  Infectious Disease    Reason for Consult:  Sepsis, rule out UTI    HPI: Froilan Rya is a 76 y.o. female with past medical history of HTN, diverticulosis, irritable bowel syndrome, chronic anemia due to iron and B12 deficiency, epilepsy, chronic back pain and PUD who recently had an endoscopy outpatient 6/10 for her PUD, biopsy showed gastric antrum positive for H pylori.  She comes to the ER for severe abdominal pain, nausea, vomit, and altered mental status.  As per records, she denied fever, chills, cough, shortness of breath, dysuria or increased urinary frequency.    Patient seen and examined in the ER, acutely ill-appearing, pale, somnolent, in able to answer questions although seems confused  Hypotensive, tachycardic, afebrile, acute abdomen  Labs with white count of 10.5, PMN 87.6%, bands of 7%, H&H 11.3/35.7, platelet count 238  PEDRO, creatinine 1.9, normal LFTs  Lactic acid 4  UA negative  Chest x-ray negative  CT head with no acute abnormalities  CT abdomen pelvis without contrast with dilated bowel, no evidence of free air.  Constipation    Discussed with ER attending, plan to get surgery stat, repeat CT abdomen and pelvis with IV contrast.    Patient admitted for sepsis likely secondary to acute abdomen/SBO, rule out diverticulitis in the setting of constipation.    ID consult for sepsis, rule out UTI.    INTERVAL HISTORY:  6/16: Interim reviewed, discussed with Surgeryery, no indication for intervention given significant amount of stool noted on imaging. Patient started on bowel regimen yesterday, 9x BMs recorded. Seen and examined at bedside, labile BP, tachycardic, looks significantly better, admits to taking more pain meds at home likely cause of her constipation, denied urinary symptoms prior to admission. Labs reviewed, WBC8.9, bands 18%. H/h 8.8/27, plt: 171. Normal kidney function, procal 10.1. Micro reviewed, blood cultures no growth to date, pending final.     6/17:  Interim  reviewed, patient seen and examined at bedside.  Feeling significantly better, still having frequent bowel movements.  Hemodynamically stable, afebrile.  Labs reviewed, stable WBC and kidney function.  Micro reviewed, no growth to date.    Review of patient's allergies indicates:  No Known Allergies  Past Medical History:   Diagnosis Date    Anemia     Arthritis     Bleeding ulcer 07/2016    Chronic pain     DDD (degenerative disc disease), cervical     DDD (degenerative disc disease), lumbar     Depression     Disc degeneration, lumbosacral     Diverticulitis     Encounter for blood transfusion     Hypertension     IBS (irritable bowel syndrome)     Neuropathy of both feet     Seizures     none  since 2017    Umbilical hernia 2020     Past Surgical History:   Procedure Laterality Date    BACK SURGERY      BREAST SURGERY Right     lumpectomy    CAUDAL EPIDURAL STEROID INJECTION N/A 1/28/2022    Procedure: Injection-steroid-epidural-caudal;  Surgeon: Luzmaria Marcelino MD;  Location: [facility] OR;  Service: Pain Management;  Laterality: N/A;    COLONOSCOPY N/A 1/14/2022    Procedure: COLONOSCOPY;  Surgeon: Abby Landers MD;  Location: [facility];  Service: Endoscopy;  Laterality: N/A;    ENDOSCOPIC ULTRASOUND OF UPPER GASTROINTESTINAL TRACT N/A 7/21/2020    Procedure: ULTRASOUND, UPPER GI TRACT, ENDOSCOPIC;  Surgeon: Marcio Nguyễn III, MD;  Location: [facility];  Service: Endoscopy;  Laterality: N/A;    ESOPHAGOGASTRODUODENOSCOPY N/A 1/14/2022    Procedure: EGD (ESOPHAGOGASTRODUODENOSCOPY);  Surgeon: Abby Landers MD;  Location: [facility];  Service: Endoscopy;  Laterality: N/A;    ESOPHAGOGASTRODUODENOSCOPY N/A 6/10/2022    Procedure: EGD (ESOPHAGOGASTRODUODENOSCOPY);  Surgeon: Abby Landers MD;  Location: [facility];  Service: Endoscopy;  Laterality: N/A;    EYE SURGERY      cataract    HYSTERECTOMY      INTRAMEDULLARY RODDING OF TROCHANTER OF FEMUR Left 12/11/2018    Procedure:  INSERTION, INTRAMEDULLARY SANTOS, FEMUR, TROCHANTER;  Surgeon: Eulalio De La Cruz MD;  Location: [facility] OR;  Service: Orthopedics;  Laterality: Left;    SPINAL CORD STIMULATOR IMPLANT  09/18/2013    and removal    SPINE SURGERY  2006    lumbar L2-S1 decompression.    SPINE SURGERY      cervical decompression    TONSILLECTOMY       Social History     Tobacco Use    Smoking status: Never Smoker    Smokeless tobacco: Never Used   Substance Use Topics    Alcohol use: No        Family History   Problem Relation Age of Onset    Diabetes Mother     Hypertension Mother     Irritable bowel syndrome Mother     Diabetes Father         insulin dependent    Hypertension Father     Heart disease Father     Coronary artery disease Father     Depression Father     Diabetes Sister     Diabetes Brother     COPD Brother        Pertinent medications noted:     Review of Systems:   Alert and awake, feeling better. No acute complaints.    EXAM & DIAGNOSTICS REVIEWED:   Vitals:     Temp:  [98 °F (36.7 °C)-98.7 °F (37.1 °C)]   Temp: 98.6 °F (37 °C) (06/17/22 0400)  Pulse: 79 (06/17/22 0752)  Resp: (!) 24 (06/17/22 0752)  BP: 128/60 (06/17/22 0645)  SpO2: 95 % (06/17/22 0752)    Intake/Output Summary (Last 24 hours) at 6/17/2022 0837  Last data filed at 6/17/2022 0700  Gross per 24 hour   Intake 3927.74 ml   Output 450 ml   Net 3477.74 ml       General:  Frail, pale lady, in NAD, alert, awake, comfortable on room air   Eyes:  Anicteric, PERRL  ENT:  Moist oral mucosa, no thrush  Neck:  Supple  Lungs: Clear to auscultation b/l  Heart:  S1/S2+, regular rhythm, no murmurs  Abd:  Reducible umbilical hernia, soft, non tender, +BS  :  Voids  Musc:  Joints without effusion, swelling,  erythema, synovitis  Skin:  Pale, warm, no rash  Wound:   Neuro: Awake, alert, following commands, moving all extremities, no acute focal deficit    Psych:  Calm   Lymphatic:       Extrem: No LE edema b/l  VAD:  R IJ     Peripheral IV       Isolation:  None      General Labs reviewed:  Recent Labs   Lab 06/15/22  0319 06/16/22  0445 06/16/22  1821 06/17/22  0533   WBC 5.98 8.91  --  8.80   HGB 11.2* 8.8*  --  8.6*   HCT 35.9* 27.0* 29.7* 27.4*    171  --  163       Recent Labs   Lab 06/15/22  0319 06/16/22  0444 06/17/22  0533    139 138   K 4.7 3.6 3.3*    109 109   CO2 16* 21* 20*   BUN 28* 30* 19   CREATININE 1.3 0.8 0.6   CALCIUM 8.6* 8.0* 8.1*   PROT 5.5* 4.5* 4.7*   BILITOT 0.5 0.6 0.4   ALKPHOS 87 66 81   ALT 19 15 15   AST 37 23 21     No results for input(s): CRP in the last 168 hours.  No results for input(s): SEDRATE in the last 168 hours.    Estimated Creatinine Clearance: 87.9 mL/min (based on SCr of 0.6 mg/dL).     Micro:  Microbiology Results (last 7 days)     Procedure Component Value Units Date/Time    Blood culture x two cultures. Draw prior to antibiotics. [[id number]] Collected: 06/14/22 2357    Order Status: Completed Specimen: Blood Updated: 06/16/22 1212     Blood Culture, Routine No Growth to date      No Growth to date    Narrative:      Aerobic and anaerobic    Blood culture x two cultures. Draw prior to antibiotics. [[id number]] Collected: 06/14/22 2358    Order Status: Completed Specimen: Blood from Peripheral, Right Updated: 06/16/22 1212     Blood Culture, Routine No Growth to date      No Growth to date    Narrative:      Aerobic and anaerobic        Pathology:   Diagnosis 1. Gastric ulcer, biopsy:       -  Ulcerated gastric antral mucosa in a background of marked reactive   gastropathy       -  Immunohistochemical stain with appropriate control is negative for   Helicobacter pylori   Comment:  The specimen shows foveolar hyperplasia with associated lamina   propria edema and vascular ectasia, consistent with reactive gastropathy.   This pattern of injury is often seen in the setting of bile reflux, alcohol   use, stress ulceration and NSAID/aspirin use. There is no evidence of atrophy   or intestinal metaplasia.   The specimen is negative for dysplasia or   malignancy.   2. Gastric antrum and body, biopsy:       -  Gastric antral and oxyntic mucosa with findings of reactive   gastropathy       -  Immunohistochemical stain with appropriate control is  POSITIVE  for   Helicobacter pylori   Comment:  The specimen shows foveolar hyperplasia with associated lamina   propria edema and vascular ectasia, consistent with reactive gastropathy.   This pattern of injury is often seen in the setting of bile reflux, alcohol   use, stress ulceration and NSAID/aspirin use. There is no evidence of atrophy   or intestinal metaplasia.  The specimen is negative for dysplasia or   malignancy.      Imaging Reviewed:  CXR  Ct abdomen/pelvis w/ contrast:   Fluid-filled distension of most of the colon with stool noted in the distal sigmoid colon and rectum may represent a fecal impaction/severe constipation.  Small-bowel obstruction is not seen.  Moderate size hiatal hernia.  Mild atelectasis at the left lung base.  Prior hysterectomy.  Prior ORIF of the left hip.  Lumbar scoliosis and degenerative disc disease at every level.  Gallbladder distention is now seen although a stone or gallbladder wall thickening is not seen.  Small umbilical hernia containing fluid and fat.   CT abdomen/pelvis w/o contrast: The colon is full of stool and dilated down to the descending colon.  There is also stool noted in the rectum.  This may represent constipation.  Free fluid or free air is not seen.  Constipation.  Prior hysterectomy.  3 mm left intrarenal stone.  Prior granulomatous disease.  Moderate size hiatal hernia. Lumbar scoliosis.  Degenerative disc disease at every level of the lumbar spine.  Prior ORIF left hip.      Cardiology:       IMPRESSION & PLAN     1. Hypovolemic shock resolved, likely secondary to significant constipation/fecal impaction in the setting of chronic narcotics    Lactic acid 4-->1.1   Procalcitonin 8.98-->10.1   Blood culturese x 2  no growth to date, pending final    UA negative    2. Deion 1.9->1.3-->0.8, resolved  3. PMHx:  HTN, diverticulosis, irritable bowel syndrome, chronic anemia due to iron and B12 deficiency, epilepsy, chronic back pain and PUD - last gastric biopsy +H pylori      Recommendations:  Will stop and monitor OFF antibiotics   Patient will need follow-up with GI or PCP outpatient to start treatment for H pylori found on EGD.  Aspiration precautions  Incentive spirometry    PT/OT as tolerated  OK to transfer to medical floor    Will sign-off, call us back with any questions    D/w Dr Vargas     Medical Decision Making during this encounter was  [_] Low Complexity  [_] Moderate Complexity  [xx] High Complexity

## 2022-06-17 NOTE — PROGRESS NOTES
[facility] Hospital Medicine  Progress Note    Patient Name: Froilan Ray  MRN: [id number]  Patient Class: IP- Inpatient   Admission Date: 6/14/2022  Length of Stay: 2 days  Attending Physician: Tamara Vargas MD  Primary Care Provider: Primary Doctor No        Subjective:     Principal Problem:Encephalopathy, metabolic        HPI:  Froilan Ray is a 76-year-old female who presents emergency room for evaluation of nausea, vomiting, abdominal pain, and altered mental status.  EMS reported to ER staff that upon their arrival patient's blood pressure was 74 systolic.  At the time of my exam patient was altered.  No family available at bedside.  All information was obtained from [facility] medical records as well as ER physician.  Previous medical history includes left hip fracture, drug induced constipation, hypertension, iron deficiency anemia, and seizure disorder.  ER workup:  CBC with mild anemia.  CMP with BUN of 31 creatinine 1.9 with bicarb of 18. Initial lactic acid was 1.4.  Second lactic acid was 3.6.  ABG with pH of 7.3, PO2 of 65, bicarb 19, pCO2 of 38. CT of the head was negative for any acute findings.  CT abdomen and pelvis demonstrates a colonic obstruction secondary to constipation of the rectosigmoid junction.  Patient was started on septic protocol.  Patient was given IV fluids.  Patient started on  vancomycin and Zosyn.  Patient admitted to Hospital Medicine for observation management.  Patient be placed in ICU.  Will consult Neurology as well as GI.      Overview/Hospital Course:  No notes on file    Interval History: Patient seen and examined. Doing well. Transitioning to PO medications today. Discussed plan for anticoagulation with Dr. Gamino, started on Eliquis 2.5 mg BID per his recommendation. Monitor off antibiotics per ID.    Review of Systems   Constitutional:  Negative for chills and fever.   HENT:  Negative for congestion and rhinorrhea.    Respiratory:  Negative for  cough, shortness of breath and wheezing.    Cardiovascular:  Negative for chest pain and leg swelling.   Gastrointestinal:  Negative for abdominal distention, abdominal pain, nausea and vomiting.   Endocrine: Negative for cold intolerance and heat intolerance.   Genitourinary:  Negative for dysuria and urgency.   Musculoskeletal:  Negative for arthralgias and neck stiffness.   Skin:  Negative for rash and wound.   Neurological:  Negative for dizziness and weakness.   Objective:     Vital Signs (Most Recent):  Temp: 97.2 °F (36.2 °C) (06/17/22 1200)  Pulse: 76 (06/17/22 1200)  Resp: (!) 25 (06/17/22 1200)  BP: (!) 180/77 (06/17/22 1200)  SpO2: 99 % (06/17/22 1200)   Vital Signs (24h Range):  Temp:  [97.2 °F (36.2 °C)-98.6 °F (37 °C)] 97.2 °F (36.2 °C)  Pulse:  [] 76  Resp:  [17-48] 25  SpO2:  [80 %-100 %] 99 %  BP: ()/(54-84) 180/77     Weight: 79.8 kg (176 lb)  Body mass index is 28.41 kg/m².    Intake/Output Summary (Last 24 hours) at 6/17/2022 1254  Last data filed at 6/17/2022 1200  Gross per 24 hour   Intake 4633.73 ml   Output 450 ml   Net 4183.73 ml        Physical Exam  Vitals and nursing note reviewed.   Constitutional:       General: She is not in acute distress.     Appearance: She is well-developed. She is not diaphoretic.   HENT:      Head: Normocephalic.      Mouth/Throat:      Mouth: Mucous membranes are moist.   Eyes:      General: No scleral icterus.     Conjunctiva/sclera: Conjunctivae normal.      Pupils: Pupils are equal, round, and reactive to light.   Neck:      Vascular: No JVD.   Cardiovascular:      Rate and Rhythm: Normal rate and regular rhythm.      Heart sounds: Normal heart sounds. No murmur heard.    No friction rub. No gallop.   Pulmonary:      Effort: Pulmonary effort is normal. No respiratory distress.      Breath sounds: Normal breath sounds. No wheezing or rales.   Abdominal:      General: Bowel sounds are normal. There is no distension.      Palpations: Abdomen is  soft.      Tenderness: There is no abdominal tenderness. There is no guarding or rebound.   Musculoskeletal:         General: No tenderness. Normal range of motion.      Cervical back: Normal range of motion and neck supple.   Lymphadenopathy:      Cervical: No cervical adenopathy.   Skin:     General: Skin is warm and dry.      Capillary Refill: Capillary refill takes less than 2 seconds.      Coloration: Skin is pale.      Findings: No erythema or rash.   Neurological:      Mental Status: She is oriented to person, place, and time.       Significant Labs: All pertinent labs within the past 24 hours have been reviewed.    Significant Imaging: I have reviewed all pertinent imaging results/findings within the past 24 hours.        Assessment/Plan:      * Encephalopathy, metabolic  Acute problem  Improving  Neurology following        Atrial fibrillation with RVR  Monitor   Amiodarone per Cardiology, transitioned to PO  Eliquis 2.5 mg BID per Caridology, ok per GI    PEDRO (acute kidney injury)  Acute problem  Patient with acute kidney injury likely d/t IVVD/Dehydration  caused by dehydration. PEDRO is currently improving. Labs reviewed- Renal function/electrolytes with Estimated Creatinine Clearance: 65.9 mL/min (based on SCr of 0.8 mg/dL). according to latest data. Monitor urine output and serial BMP and adjust therapy as needed. Avoid nephrotoxins and renally dose meds for GFR listed above.       Sepsis  Monitor off antibiotics per ID      Iron deficiency anemia  Chronic problem  Received iron infusion outpatient day PTA  Monitor CBC closely      HTN (hypertension)  Chronic  Chronic, controlled.  Latest blood pressure and vitals reviewed-   Temp:  [97.2 °F (36.2 °C)-98.6 °F (37 °C)]   Pulse:  []   Resp:  [17-48]   BP: ()/(54-84)   SpO2:  [80 %-100 %] .   Home meds for hypertension were reviewed and noted below.   Hypertension Medications             amlodipine-benazepril 5-20 mg (LOTREL) 5-20 mg per capsule  Take 1 capsule by mouth once daily.    furosemide (LASIX) 40 MG tablet Take 40 mg by mouth daily as needed.          While in the hospital, will manage blood pressure as follows; Continue home antihypertensive regimen    Will utilize p.r.n. blood pressure medication only if patient's blood pressure greater than  180/110 and she develops symptoms such as worsening chest pain or shortness of breath.        VTE Risk Mitigation (From admission, onward)         Ordered     apixaban tablet 2.5 mg  2 times daily         06/17/22 1035     IP VTE HIGH RISK PATIENT  Once         06/15/22 0401     Place sequential compression device  Until discontinued         06/15/22 0401     Place CARI hose  Until discontinued         06/15/22 0401                Discharge Planning   BAILEY: 6/18/2022     Code Status: Full Code   Is the patient medically ready for discharge?:     Reason for patient still in hospital (select all that apply): Patient trending condition and Treatment  Discharge Plan A: Home with family            Critical care time spent on the evaluation and treatment of severe organ dysfunction, review of pertinent labs and imaging studies, discussions with consulting providers and discussions with patient/family: 45 minutes.      Tamara Vargas MD  Department of Hospital Medicine   [facility]

## 2022-06-17 NOTE — PT/OT/SLP PROGRESS
Occupational Therapy      Patient Name:  Froilan Ray   MRN:  [id number]    Patient not seen today secondary to Patient unwilling to participate, Other (Comment) (OT evaluation attempted at 13:47 after clearance from nurse, Johanna; however, patient unwilling. Patient reports extreme fatigue from lack of sleep at night and just returning to bed from sitting up in chair. Pt reports worried about spouse as he is now under cardiac care). OTR ensuring patient's needs met prior to leaving. Patient requesting room curtain and door be fully closed so she can rest. Will follow-up 06/18/2022.    6/17/2022

## 2022-06-17 NOTE — PLAN OF CARE
Patient is alert & oriented x3, vital signs stable except on Amiodarone drip for heart rate and 2 L oxygen binasal canula, denies any distress, received Percocet  mg P.O x2 for chronic back pain, NPO since midnight for cardioversion but heart rate is down from 130's - 140's to 70's, NP notified about heart rate changes and strip in patients chart, x2 BM overnight, continuous to be on Heparin drip and waiting on APTT this morning, on P.O Digoxin & metoprolol, needs assessed, safety sweep done, will continue to monitor.

## 2022-06-17 NOTE — SUBJECTIVE & OBJECTIVE
Interval History: Patient seen and examined. Doing well. Transitioning to PO medications today. Discussed plan for anticoagulation with Dr. Gamino, started on Eliquis 2.5 mg BID per his recommendation. Monitor off antibiotics per ID.    Review of Systems   Constitutional:  Negative for chills and fever.   HENT:  Negative for congestion and rhinorrhea.    Respiratory:  Negative for cough, shortness of breath and wheezing.    Cardiovascular:  Negative for chest pain and leg swelling.   Gastrointestinal:  Negative for abdominal distention, abdominal pain, nausea and vomiting.   Endocrine: Negative for cold intolerance and heat intolerance.   Genitourinary:  Negative for dysuria and urgency.   Musculoskeletal:  Negative for arthralgias and neck stiffness.   Skin:  Negative for rash and wound.   Neurological:  Negative for dizziness and weakness.   Objective:     Vital Signs (Most Recent):  Temp: 97.2 °F (36.2 °C) (06/17/22 1200)  Pulse: 76 (06/17/22 1200)  Resp: (!) 25 (06/17/22 1200)  BP: (!) 180/77 (06/17/22 1200)  SpO2: 99 % (06/17/22 1200)   Vital Signs (24h Range):  Temp:  [97.2 °F (36.2 °C)-98.6 °F (37 °C)] 97.2 °F (36.2 °C)  Pulse:  [] 76  Resp:  [17-48] 25  SpO2:  [80 %-100 %] 99 %  BP: ()/(54-84) 180/77     Weight: 79.8 kg (176 lb)  Body mass index is 28.41 kg/m².    Intake/Output Summary (Last 24 hours) at 6/17/2022 1254  Last data filed at 6/17/2022 1200  Gross per 24 hour   Intake 4633.73 ml   Output 450 ml   Net 4183.73 ml        Physical Exam  Vitals and nursing note reviewed.   Constitutional:       General: She is not in acute distress.     Appearance: She is well-developed. She is not diaphoretic.   HENT:      Head: Normocephalic.      Mouth/Throat:      Mouth: Mucous membranes are moist.   Eyes:      General: No scleral icterus.     Conjunctiva/sclera: Conjunctivae normal.      Pupils: Pupils are equal, round, and reactive to light.   Neck:      Vascular: No JVD.   Cardiovascular:      Rate  and Rhythm: Normal rate and regular rhythm.      Heart sounds: Normal heart sounds. No murmur heard.    No friction rub. No gallop.   Pulmonary:      Effort: Pulmonary effort is normal. No respiratory distress.      Breath sounds: Normal breath sounds. No wheezing or rales.   Abdominal:      General: Bowel sounds are normal. There is no distension.      Palpations: Abdomen is soft.      Tenderness: There is no abdominal tenderness. There is no guarding or rebound.   Musculoskeletal:         General: No tenderness. Normal range of motion.      Cervical back: Normal range of motion and neck supple.   Lymphadenopathy:      Cervical: No cervical adenopathy.   Skin:     General: Skin is warm and dry.      Capillary Refill: Capillary refill takes less than 2 seconds.      Coloration: Skin is pale.      Findings: No erythema or rash.   Neurological:      Mental Status: She is oriented to person, place, and time.       Significant Labs: All pertinent labs within the past 24 hours have been reviewed.    Significant Imaging: I have reviewed all pertinent imaging results/findings within the past 24 hours.

## 2022-06-17 NOTE — NURSING
Bedside report given to HEATHER Barber. Pt to room [address] via wheelchair. Transferred with pt belongings and chart. Oriented to room. Will cont to monitor.

## 2022-06-17 NOTE — ASSESSMENT & PLAN NOTE
Chronic  Chronic, controlled.  Latest blood pressure and vitals reviewed-   Temp:  [97.2 °F (36.2 °C)-98.6 °F (37 °C)]   Pulse:  []   Resp:  [17-48]   BP: ()/(54-84)   SpO2:  [80 %-100 %] .   Home meds for hypertension were reviewed and noted below.   Hypertension Medications             amlodipine-benazepril 5-20 mg (LOTREL) 5-20 mg per capsule Take 1 capsule by mouth once daily.    furosemide (LASIX) 40 MG tablet Take 40 mg by mouth daily as needed.          While in the hospital, will manage blood pressure as follows; Continue home antihypertensive regimen    Will utilize p.r.n. blood pressure medication only if patient's blood pressure greater than  180/110 and she develops symptoms such as worsening chest pain or shortness of breath.

## 2022-06-17 NOTE — PT/OT/SLP EVAL
Physical Therapy Evaluation    Patient Name:  Froilan Ray   MRN:  [id number]    Recommendations:     Discharge Recommendations:  home   Discharge Equipment Recommendations: none   Barriers to discharge: None    Assessment:     Froilan Ray is a 76 y.o. female admitted with a medical diagnosis of Encephalopathy, metabolic.  She presents with the following impairments/functional limitations:  weakness, impaired endurance, impaired functional mobilty, gait instability, impaired balance, decreased lower extremity function, decreased ROM . Patient agreeable to PT evaluation but reported could not ambulate due to having no control over loose stools.   Patient presented supine to sit and was able to transfer to sitting with SBA and the to [facility] with CGA and no AD.      Rehab Prognosis: Good; patient would benefit from acute skilled PT services to address these deficits and reach maximum level of function.    Recent Surgery: * No surgery found *      Plan:     During this hospitalization, patient to be seen 6 x/week to address the identified rehab impairments via gait training, therapeutic activities, therapeutic exercises and progress toward the following goals:    · Plan of Care Expires:  07/01/22    Subjective     Chief Complaint: having loose BM  Patient/Family Comments/goals: stop going to the bathroom  Pain/Comfort:  · Pain Rating 1: 0/10    Patients cultural, spiritual, Mosque conflicts given the current situation:      Living Environment:  Currently lives with spouse in 1 story trailer with 2 steps to enter with a hand rail.  Prior to admission, patients level of function was modified independent.  Equipment used at home: walker, rolling, cane, straight.  DME owned (not currently used): none.  Upon discharge, patient will have assistance from family.    Objective:     Communicated with nurse prior to session.  Patient found supine with bed alarm, blood pressure cuff, peripheral IV, pulse ox  (continuous), telemetry  upon PT entry to room.    General Precautions: Standard, fall   Orthopedic Precautions:N/A   Braces:    Respiratory Status: Room air    Exams:  · RLE ROM: WFL  · RLE Strength: Deficits: 4+/5 overall  · LLE ROM: WFL  · LLE Strength: Deficits: 4+/5 overall    Functional Mobility:  · Bed Mobility:     · Supine to Sit: stand by assistance  · Sit to Supine: stand by assistance  · Transfers:     · Sit to Stand:  contact guard assistance with no AD  · Bed to Chair: contact guard assistance with  no AD  using  Step Transfer    Therapeutic Activities and Exercises:   transfer training supine to/from sit SBA, sit to stand CGA, EOB to/from BSC no aD CGA  Standing tolerance to get cleaned following BM    AM-PAC 6 CLICK MOBILITY  Total Score:18     Patient left supine with call button in reach, bed alarm on and nurse notified.    GOALS:   Multidisciplinary Problems     Physical Therapy Goals        Problem: Physical Therapy    Goal Priority Disciplines Outcome Goal Variances Interventions   Physical Therapy Goal     PT, PT/OT Ongoing, Progressing     Description: Goals to be met by: 22    Patient will increase functional independence with mobility by performin. Supine to sit with Alger  2. Sit to supine with Alger  3. Sit to stand transfer with Supervision  4. Bed to chair transfer with Supervision using Rolling Walker  5. Gait  x 150 feet with Supervision using Rolling Walker.                      History:     Past Medical History:   Diagnosis Date    Anemia     Arthritis     Bleeding ulcer 2016    Chronic pain     DDD (degenerative disc disease), cervical     DDD (degenerative disc disease), lumbar     Depression     Disc degeneration, lumbosacral     Diverticulitis     Encounter for blood transfusion     Hypertension     IBS (irritable bowel syndrome)     Neuropathy of both feet     Seizures     none  since     Umbilical hernia        Past Surgical  History:   Procedure Laterality Date    BACK SURGERY      BREAST SURGERY Right     lumpectomy    CAUDAL EPIDURAL STEROID INJECTION N/A 1/28/2022    Procedure: Injection-steroid-epidural-caudal;  Surgeon: Luzmaria Marcelino MD;  Location: [facility] OR;  Service: Pain Management;  Laterality: N/A;    COLONOSCOPY N/A 1/14/2022    Procedure: COLONOSCOPY;  Surgeon: Abby Landers MD;  Location: [facility] ENDO;  Service: Endoscopy;  Laterality: N/A;    ENDOSCOPIC ULTRASOUND OF UPPER GASTROINTESTINAL TRACT N/A 7/21/2020    Procedure: ULTRASOUND, UPPER GI TRACT, ENDOSCOPIC;  Surgeon: Marcio Nguyễn III, MD;  Location: [facility] ENDO;  Service: Endoscopy;  Laterality: N/A;    ESOPHAGOGASTRODUODENOSCOPY N/A 1/14/2022    Procedure: EGD (ESOPHAGOGASTRODUODENOSCOPY);  Surgeon: Abby Landers MD;  Location: [facility];  Service: Endoscopy;  Laterality: N/A;    ESOPHAGOGASTRODUODENOSCOPY N/A 6/10/2022    Procedure: EGD (ESOPHAGOGASTRODUODENOSCOPY);  Surgeon: Abby Landers MD;  Location: [facility];  Service: Endoscopy;  Laterality: N/A;    EYE SURGERY      cataract    HYSTERECTOMY      INTRAMEDULLARY RODDING OF TROCHANTER OF FEMUR Left 12/11/2018    Procedure: INSERTION, INTRAMEDULLARY SANTOS, FEMUR, TROCHANTER;  Surgeon: Eulalio De La Cruz MD;  Location: [facility];  Service: Orthopedics;  Laterality: Left;    SPINAL CORD STIMULATOR IMPLANT  09/18/2013    and removal    SPINE SURGERY  2006    lumbar L2-S1 decompression.    SPINE SURGERY      cervical decompression    TONSILLECTOMY         Time Tracking:     PT Received On: 06/17/22  PT Start Time: 1053     PT Stop Time: 1124  PT Total Time (min): 31 min     Billable Minutes: Evaluation 20 and Therapeutic Activity 11      06/17/2022

## 2022-06-17 NOTE — PLAN OF CARE
The patient was doing well this morning. She was a bit drowsy to wake up but was orientated x4. I removed the barrera per standing protocol. Prior to this, it was observed the urine was concentrated peace. The patient was still NPO except meds and asking to drink water and eat, so in the meantime started the NS at 100ml/hr. I got her up to the commode without much difficulty. She did have some fatigue and generalized weakness. I had not given the 0800 enema yet when I was advised to discontinue / no more enemas necessary. At this time, the abdomen was soft, with active bowel sounds, patient not complaining of pain. On the commode she had x1 large liquid BM with some formed pieces present. Altogether she had x3 Bms my shift (second two per bedpan, moderate-large liquid/loose brown).    Then, patient converted into afib rvr rate up to 180s. She was asymptomatic. The blood pressure was lower with this rate. So, notified md, dr bonnie paez. I gave her cardizem with no effect, then the digoxin and amiodarone also with little effect, so lopressor was given. She remained tachycardic through the end of my shift and notified md, so we will plan for possibly cardioverting her in the morning.    The stool was sent for occult blood d/t recent anemia /blood loss (in lieu of not immediately starting anticoagulation). D-dimer was sent which was elevated, so went down for CTA which was negative. Additionally I called the ultrasound tech to make sure they were aware of order for leg ultrasound. After this ddimer result, heparin drip was started (initial bolus + 12 units/kg/hr infusing). The next PTT is due tonight at around 2030. I started an additional IV, 20 gauge to left wrist as she had been bending and occluding the left AC IV.    The patient is tachypneic. When resting she saturates well on room air but for the moment does seem to require some supplemental oxygen 2lpm nasal cannula when moving about in the bed, sleeping, eating,  or doing other activities.    The  is at bedside and was updated on plan of care.    She complains of neuropathic pain to feet and back pain. I gave the oxycodone/percocet to good effect but other meds are on hold d/t qt interval and also relative hypotension.    At around 1700, patient had not voided s/p barrera removal and so I asked her to try to urinate after I appreciated a bladder scan of 340mL at 1630 but she could not and so I straight cathed x1 without difficulty, per protocol and returned 450mL peace urine.    The bed is low and alarm on. Clinical alarms reviewed at start of shift and armed. Monitor strip printed and placed in chart.\    Echo had to be postponed due to elevated heart rate.    She has some edema 1-2 bilat lower legs.

## 2022-06-17 NOTE — PROGRESS NOTES
Froilan Ray [id number] is a 76 y.o. female who has been consulted for vancomycin dosing.    Pharmacy consult for vancomycin dosing in no longer required.  Vancomycin was discontinued.    Thank you for allowing us to participate in this patient's care.     Wally Hernandez, PharmD  [phone]

## 2022-06-17 NOTE — CARE UPDATE
06/17/22 0752   Patient Assessment/Suction   Level of Consciousness (AVPU) alert   Respiratory Effort Unlabored   Expansion/Accessory Muscles/Retractions no use of accessory muscles   All Lung Fields Breath Sounds Anterior:;Lateral:;clear   Rhythm/Pattern, Respiratory unlabored   PRE-TX-O2   O2 Device (Oxygen Therapy) nasal cannula   $ Is the patient on Low Flow Oxygen? Yes   Flow (L/min) 2   Oxygen Concentration (%) 28   SpO2 95 %   Pulse Oximetry Type Continuous   $ Pulse Oximetry - Multiple Charge Pulse Oximetry - Multiple   Oximetry Probe Site Intact   Pulse 79   Resp (!) 24   Aerosol Therapy   $ Aerosol Therapy Charges PRN treatment not required   Respiratory Treatment Status (SVN) PRN treatment not required

## 2022-06-18 LAB
ALBUMIN SERPL BCP-MCNC: 2.3 G/DL (ref 3.5–5.2)
ALP SERPL-CCNC: 96 U/L (ref 55–135)
ALT SERPL W/O P-5'-P-CCNC: 16 U/L (ref 10–44)
ANION GAP SERPL CALC-SCNC: 11 MMOL/L (ref 8–16)
ANISOCYTOSIS BLD QL SMEAR: SLIGHT
AST SERPL-CCNC: 20 U/L (ref 10–40)
BASOPHILS # BLD AUTO: ABNORMAL K/UL (ref 0–0.2)
BASOPHILS NFR BLD: 0 % (ref 0–1.9)
BILIRUB SERPL-MCNC: 0.7 MG/DL (ref 0.1–1)
BUN SERPL-MCNC: 9 MG/DL (ref 8–23)
CALCIUM SERPL-MCNC: 8.5 MG/DL (ref 8.7–10.5)
CHLORIDE SERPL-SCNC: 110 MMOL/L (ref 95–110)
CO2 SERPL-SCNC: 17 MMOL/L (ref 23–29)
CREAT SERPL-MCNC: 0.6 MG/DL (ref 0.5–1.4)
DIFFERENTIAL METHOD: ABNORMAL
EOSINOPHIL # BLD AUTO: ABNORMAL K/UL (ref 0–0.5)
EOSINOPHIL NFR BLD: 2 % (ref 0–8)
ERYTHROCYTE [DISTWIDTH] IN BLOOD BY AUTOMATED COUNT: 15.2 % (ref 11.5–14.5)
EST. GFR  (AFRICAN AMERICAN): >60 ML/MIN/1.73 M^2
EST. GFR  (NON AFRICAN AMERICAN): >60 ML/MIN/1.73 M^2
GLUCOSE SERPL-MCNC: 99 MG/DL (ref 70–110)
HCT VFR BLD AUTO: 32.6 % (ref 37–48.5)
HGB BLD-MCNC: 10.3 G/DL (ref 12–16)
HYPOCHROMIA BLD QL SMEAR: ABNORMAL
IMM GRANULOCYTES # BLD AUTO: ABNORMAL K/UL (ref 0–0.04)
IMM GRANULOCYTES NFR BLD AUTO: ABNORMAL % (ref 0–0.5)
LYMPHOCYTES # BLD AUTO: ABNORMAL K/UL (ref 1–4.8)
LYMPHOCYTES NFR BLD: 8 % (ref 18–48)
MCH RBC QN AUTO: 29 PG (ref 27–31)
MCHC RBC AUTO-ENTMCNC: 31.6 G/DL (ref 32–36)
MCV RBC AUTO: 92 FL (ref 82–98)
MONOCYTES # BLD AUTO: ABNORMAL K/UL (ref 0.3–1)
MONOCYTES NFR BLD: 4 % (ref 4–15)
NEUTROPHILS NFR BLD: 83 % (ref 38–73)
NEUTS BAND NFR BLD MANUAL: 3 %
NRBC BLD-RTO: 0 /100 WBC
OVALOCYTES BLD QL SMEAR: ABNORMAL
PLATELET # BLD AUTO: 163 K/UL (ref 150–450)
PLATELET BLD QL SMEAR: ABNORMAL
PMV BLD AUTO: 10.5 FL (ref 9.2–12.9)
POCT GLUCOSE: 104 MG/DL (ref 70–110)
POCT GLUCOSE: 107 MG/DL (ref 70–110)
POCT GLUCOSE: 92 MG/DL (ref 70–110)
POIKILOCYTOSIS BLD QL SMEAR: SLIGHT
POTASSIUM SERPL-SCNC: 3.7 MMOL/L (ref 3.5–5.1)
PROT SERPL-MCNC: 5.6 G/DL (ref 6–8.4)
RBC # BLD AUTO: 3.55 M/UL (ref 4–5.4)
SODIUM SERPL-SCNC: 138 MMOL/L (ref 136–145)
WBC # BLD AUTO: 7.34 K/UL (ref 3.9–12.7)

## 2022-06-18 PROCEDURE — 85027 COMPLETE CBC AUTOMATED: CPT | Performed by: HOSPITALIST

## 2022-06-18 PROCEDURE — 99233 SBSQ HOSP IP/OBS HIGH 50: CPT | Mod: ,,, | Performed by: INTERNAL MEDICINE

## 2022-06-18 PROCEDURE — 99233 PR SUBSEQUENT HOSPITAL CARE,LEVL III: ICD-10-PCS | Mod: ,,, | Performed by: INTERNAL MEDICINE

## 2022-06-18 PROCEDURE — 63600175 PHARM REV CODE 636 W HCPCS: Performed by: NURSE PRACTITIONER

## 2022-06-18 PROCEDURE — 80053 COMPREHEN METABOLIC PANEL: CPT | Performed by: HOSPITALIST

## 2022-06-18 PROCEDURE — 87077 CULTURE AEROBIC IDENTIFY: CPT | Performed by: HOSPITALIST

## 2022-06-18 PROCEDURE — 36415 COLL VENOUS BLD VENIPUNCTURE: CPT | Performed by: HOSPITALIST

## 2022-06-18 PROCEDURE — 25000003 PHARM REV CODE 250: Performed by: HOSPITALIST

## 2022-06-18 PROCEDURE — 87186 SC STD MICRODIL/AGAR DIL: CPT | Performed by: HOSPITALIST

## 2022-06-18 PROCEDURE — 63600175 PHARM REV CODE 636 W HCPCS: Performed by: HOSPITALIST

## 2022-06-18 PROCEDURE — 99900035 HC TECH TIME PER 15 MIN (STAT)

## 2022-06-18 PROCEDURE — 25000003 PHARM REV CODE 250

## 2022-06-18 PROCEDURE — 94761 N-INVAS EAR/PLS OXIMETRY MLT: CPT

## 2022-06-18 PROCEDURE — 85007 BL SMEAR W/DIFF WBC COUNT: CPT | Performed by: HOSPITALIST

## 2022-06-18 PROCEDURE — 97116 GAIT TRAINING THERAPY: CPT | Mod: CQ

## 2022-06-18 PROCEDURE — 12000002 HC ACUTE/MED SURGE SEMI-PRIVATE ROOM

## 2022-06-18 PROCEDURE — 87040 BLOOD CULTURE FOR BACTERIA: CPT | Performed by: HOSPITALIST

## 2022-06-18 RX ORDER — MORPHINE SULFATE 4 MG/ML
4 INJECTION, SOLUTION INTRAMUSCULAR; INTRAVENOUS ONCE
Status: COMPLETED | OUTPATIENT
Start: 2022-06-18 | End: 2022-06-18

## 2022-06-18 RX ADMIN — POTASSIUM & SODIUM PHOSPHATES POWDER PACK 280-160-250 MG 1 PACKET: 280-160-250 PACK at 08:06

## 2022-06-18 RX ADMIN — OXYCODONE AND ACETAMINOPHEN 1 TABLET: 10; 325 TABLET ORAL at 09:06

## 2022-06-18 RX ADMIN — AMIODARONE HYDROCHLORIDE 200 MG: 200 TABLET ORAL at 08:06

## 2022-06-18 RX ADMIN — METOPROLOL TARTRATE 25 MG: 25 TABLET, FILM COATED ORAL at 03:06

## 2022-06-18 RX ADMIN — OXYCODONE AND ACETAMINOPHEN 1 TABLET: 10; 325 TABLET ORAL at 03:06

## 2022-06-18 RX ADMIN — MELATONIN TAB 3 MG 9 MG: 3 TAB at 08:06

## 2022-06-18 RX ADMIN — METOPROLOL TARTRATE 25 MG: 25 TABLET, FILM COATED ORAL at 08:06

## 2022-06-18 RX ADMIN — VANCOMYCIN HYDROCHLORIDE 1500 MG: 1.5 INJECTION, POWDER, LYOPHILIZED, FOR SOLUTION INTRAVENOUS at 09:06

## 2022-06-18 RX ADMIN — SODIUM CHLORIDE: 0.9 INJECTION, SOLUTION INTRAVENOUS at 03:06

## 2022-06-18 RX ADMIN — MUPIROCIN: 20 OINTMENT TOPICAL at 08:06

## 2022-06-18 RX ADMIN — APIXABAN 2.5 MG: 2.5 TABLET, FILM COATED ORAL at 08:06

## 2022-06-18 RX ADMIN — ONDANSETRON 4 MG: 2 INJECTION INTRAMUSCULAR; INTRAVENOUS at 12:06

## 2022-06-18 RX ADMIN — POLYETHYLENE GLYCOL 3350 17 G: 17 POWDER, FOR SOLUTION ORAL at 08:06

## 2022-06-18 RX ADMIN — MORPHINE SULFATE 4 MG: 4 INJECTION INTRAVENOUS at 02:06

## 2022-06-18 NOTE — SUBJECTIVE & OBJECTIVE
Objective:     Vital Signs (Most Recent):  Temp: 96.9 °F (36.1 °C) (06/18/22 1522)  Pulse: 84 (06/18/22 1522)  Resp: 18 (06/18/22 1528)  BP: (!) 168/79 (06/18/22 1522)  SpO2: 96 % (06/18/22 1522)   Vital Signs (24h Range):  Temp:  [96.1 °F (35.6 °C)-97.4 °F (36.3 °C)] 96.9 °F (36.1 °C)  Pulse:  [] 84  Resp:  [16-18] 18  SpO2:  [94 %-98 %] 96 %  BP: (158-177)/(70-84) 168/79     Weight: 79.8 kg (176 lb)  Body mass index is 28.41 kg/m².     SpO2: 96 %  O2 Device (Oxygen Therapy): room air      Intake/Output Summary (Last 24 hours) at 6/18/2022 1728  Last data filed at 6/18/2022 0400  Gross per 24 hour   Intake 1000 ml   Output --   Net 1000 ml       Lines/Drains/Airways       Peripheral Intravenous Line  Duration                  Peripheral IV - Single Lumen 06/15/22 0120 18 G Anterior;Right 3 days         Peripheral IV - Single Lumen 06/16/22 1752 20 G Distal;Left;Posterior Forearm 1 day                    Physical Exam  Vitals and nursing note reviewed.   Constitutional:       Appearance: She is obese.   HENT:      Head: Normocephalic and atraumatic.      Nose: Nose normal.   Eyes:      Extraocular Movements: Extraocular movements intact.      Pupils: Pupils are equal, round, and reactive to light.   Neck:      Vascular: No carotid bruit.   Cardiovascular:      Rate and Rhythm: Normal rate and regular rhythm.   Pulmonary:      Effort: Pulmonary effort is normal.      Breath sounds: No wheezing or rales.   Abdominal:      Palpations: Abdomen is soft.   Musculoskeletal:      Cervical back: Neck supple.      Right lower leg: No edema.      Left lower leg: No edema.   Skin:     General: Skin is warm and dry.   Neurological:      General: No focal deficit present.      Mental Status: She is alert and oriented to person, place, and time.   Psychiatric:         Mood and Affect: Mood normal.         Behavior: Behavior normal.       Significant Labs: BMP:   Recent Labs   Lab 06/17/22  0533 06/18/22  0516   * 99  "   138   K 3.3* 3.7    110   CO2 20* 17*   BUN 19 9   CREATININE 0.6 0.6   CALCIUM 8.1* 8.5*   MG 2.1  --    , CMP   Recent Labs   Lab 06/17/22  0533 06/18/22  0516    138   K 3.3* 3.7    110   CO2 20* 17*   * 99   BUN 19 9   CREATININE 0.6 0.6   CALCIUM 8.1* 8.5*   PROT 4.7* 5.6*   ALBUMIN 1.9* 2.3*   BILITOT 0.4 0.7   ALKPHOS 81 96   AST 21 20   ALT 15 16   ANIONGAP 9 11   ESTGFRAFRICA >60 >60   EGFRNONAA >60 >60   , CBC   Recent Labs   Lab 06/17/22  0533 06/18/22  0516   WBC 8.80 7.34   HGB 8.6* 10.3*   HCT 27.4* 32.6*    163   , INR No results for input(s): INR, PROTIME in the last 48 hours., Troponin No results for input(s): TROPONINI in the last 48 hours., and All pertinent lab results from the last 24 hours have been reviewed.    Significant Imaging: Echocardiogram: Transthoracic echo (TTE) complete (Cupid Only):   Results for orders placed or performed during the hospital encounter of 06/14/22   Echo   Result Value Ref Range    BSA 1.93 m2    TDI SEPTAL 0.07 m/s    LV LATERAL E/E' RATIO 12.00 m/s    LV SEPTAL E/E' RATIO 17.14 m/s    LA WIDTH 4.70 cm    AORTIC VALVE CUSP SEPERATION 2.16 cm    TDI LATERAL 0.10 m/s    PV PEAK VELOCITY 0.97 cm/s    LVIDd 5.47 3.5 - 6.0 cm    IVS 1.19 (A) 0.6 - 1.1 cm    Posterior Wall 1.16 (A) 0.6 - 1.1 cm    Ao root annulus 3.23 cm    LVIDs 4.06 (A) 2.1 - 4.0 cm    FS 26 28 - 44 %    LA volume 97.00 cm3    Sinus 2.79 cm    STJ 2.78 cm    Ascending aorta 3.51 cm    LV mass 262.30 g    LA size 4.04 cm    RVDD 3.13 cm    TAPSE 2.50 cm    RV S' 19.83 cm/s    Left Ventricle Relative Wall Thickness 0.42 cm    AV mean gradient 4 mmHg    AV valve area 3.17 cm2    AV Velocity Ratio 0.94     AV index (prosthetic) 0.93     MV mean gradient 1 mmHg    MV valve area p 1/2 method 5.08 cm2    MV valve area by continuity eq 2.34 cm2    E/A ratio 1.11     Mean e' 0.09 m/s    E wave deceleration time 149.32 msec    IVRT 125.59 msec    MV "A" wave duration " 8.37 msec    Pulm vein S/D ratio 1.78     LVOT diameter 2.08 cm    LVOT area 3.4 cm2    LVOT peak arnaldo 1.28 m/s    LVOT peak VTI 25.10 cm    Ao peak arnaldo 1.36 m/s    Ao VTI 26.90 cm    Mr max arnaldo 0.05 m/s    LVOT stroke volume 85.25 cm3    AV peak gradient 7 mmHg    MV peak gradient 6 mmHg    E/E' ratio 14.12 m/s    MV Peak E Arnaldo 1.20 m/s    TR Max Arnaldo 2.47 m/s    MV VTI 36.44 cm    MV stenosis pressure 1/2 time 43.30 ms    MV Peak A Arnaldo 1.08 m/s    PV Peak S Arnaldo 0.71 m/s    PV Peak D Arnaldo 0.40 m/s    LV Systolic Volume 72.51 mL    LV Systolic Volume Index 38.4 mL/m2    LV Diastolic Volume 145.51 mL    LV Diastolic Volume Index 76.99 mL/m2    LA Volume Index 51.3 mL/m2    LV Mass Index 139 g/m2    RA Major Axis 4.62 cm    Left Atrium Minor Axis 5.99 cm    Left Atrium Major Axis 6.03 cm    Triscuspid Valve Regurgitation Peak Gradient 24 mmHg    LA Volume Index (Mod) 51.4 mL/m2    LA volume (mod) 97.17 cm3    RA Width 2.84 cm    Right Atrial Pressure (from IVC) 3 mmHg    EF 50 %    TV rest pulmonary artery pressure 27 mmHg    Narrative    · The left ventricle is normal in size with concentric remodeling and   normal systolic function.  · Moderate left atrial enlargement.  · The estimated ejection fraction is 50%.  · Indeterminate left ventricular diastolic function with normal left   atrial pressure.  · Normal right ventricular size with normal right ventricular systolic   function.  · Mild mitral regurgitation.  · Normal central venous pressure (3 mmHg).  · The estimated PA systolic pressure is 27 mmHg.

## 2022-06-18 NOTE — PLAN OF CARE
Problem: Adult Inpatient Plan of Care  Goal: Plan of Care Review  Outcome: Ongoing, Progressing  Goal: Patient-Specific Goal (Individualized)  Outcome: Ongoing, Progressing  Goal: Absence of Hospital-Acquired Illness or Injury  Outcome: Ongoing, Progressing  Goal: Optimal Comfort and Wellbeing  Outcome: Ongoing, Progressing  Goal: Readiness for Transition of Care  Outcome: Ongoing, Progressing     Problem: Infection  Goal: Absence of Infection Signs and Symptoms  Outcome: Ongoing, Progressing     Problem: Skin Injury Risk Increased  Goal: Skin Health and Integrity  Outcome: Ongoing, Progressing     Problem: Adjustment to Illness (Sepsis/Septic Shock)  Goal: Optimal Coping  Outcome: Ongoing, Progressing     Problem: Bleeding (Sepsis/Septic Shock)  Goal: Absence of Bleeding  Outcome: Ongoing, Progressing     Problem: Glycemic Control Impaired (Sepsis/Septic Shock)  Goal: Blood Glucose Level Within Desired Range  Outcome: Ongoing, Progressing     Problem: Infection Progression (Sepsis/Septic Shock)  Goal: Absence of Infection Signs and Symptoms  Outcome: Ongoing, Progressing     Problem: Nutrition Impaired (Sepsis/Septic Shock)  Goal: Optimal Nutrition Intake  Outcome: Ongoing, Progressing     Problem: Fluid and Electrolyte Imbalance (Acute Kidney Injury/Impairment)  Goal: Fluid and Electrolyte Balance  Outcome: Ongoing, Progressing     Problem: Oral Intake Inadequate (Acute Kidney Injury/Impairment)  Goal: Optimal Nutrition Intake  Outcome: Ongoing, Progressing     Problem: Renal Function Impairment (Acute Kidney Injury/Impairment)  Goal: Effective Renal Function  Outcome: Ongoing, Progressing     Problem: Oral Intake Inadequate  Goal: Improved Oral Intake  Outcome: Ongoing, Progressing     Problem: Fall Injury Risk  Goal: Absence of Fall and Fall-Related Injury  Outcome: Ongoing, Progressing

## 2022-06-18 NOTE — SUBJECTIVE & OBJECTIVE
Interval History: Patient seen and examined. Doing well. Blood culture with possible staph, placed back on Vanc until we see if it is a contaminant    Review of Systems   Constitutional:  Negative for chills and fever.   HENT:  Negative for congestion and rhinorrhea.    Respiratory:  Negative for cough, shortness of breath and wheezing.    Cardiovascular:  Negative for chest pain and leg swelling.   Gastrointestinal:  Negative for abdominal distention, abdominal pain, nausea and vomiting.   Endocrine: Negative for cold intolerance and heat intolerance.   Genitourinary:  Negative for dysuria and urgency.   Musculoskeletal:  Negative for arthralgias and neck stiffness.   Skin:  Negative for rash and wound.   Neurological:  Negative for dizziness and weakness.   Objective:     Vital Signs (Most Recent):  Temp: 97 °F (36.1 °C) (06/18/22 0726)  Pulse: 102 (06/18/22 0744)  Resp: 16 (06/18/22 0951)  BP: (!) 166/78 (06/18/22 0726)  SpO2: 98 % (06/18/22 0937)   Vital Signs (24h Range):  Temp:  [96.1 °F (35.6 °C)-97.4 °F (36.3 °C)] 97 °F (36.1 °C)  Pulse:  [] 102  Resp:  [16-30] 16  SpO2:  [90 %-100 %] 98 %  BP: (137-189)/(65-84) 166/78     Weight: 79.8 kg (176 lb)  Body mass index is 28.41 kg/m².    Intake/Output Summary (Last 24 hours) at 6/18/2022 1011  Last data filed at 6/18/2022 0400  Gross per 24 hour   Intake 2023.98 ml   Output --   Net 2023.98 ml        Physical Exam  Vitals and nursing note reviewed.   Constitutional:       General: She is not in acute distress.     Appearance: She is well-developed. She is not diaphoretic.   HENT:      Head: Normocephalic.      Mouth/Throat:      Mouth: Mucous membranes are moist.   Eyes:      General: No scleral icterus.     Conjunctiva/sclera: Conjunctivae normal.      Pupils: Pupils are equal, round, and reactive to light.   Neck:      Vascular: No JVD.   Cardiovascular:      Rate and Rhythm: Normal rate and regular rhythm.      Heart sounds: Normal heart sounds. No  murmur heard.    No friction rub. No gallop.   Pulmonary:      Effort: Pulmonary effort is normal. No respiratory distress.      Breath sounds: Normal breath sounds. No wheezing or rales.   Abdominal:      General: Bowel sounds are normal. There is no distension.      Palpations: Abdomen is soft.      Tenderness: There is no abdominal tenderness. There is no guarding or rebound.   Musculoskeletal:         General: No tenderness. Normal range of motion.      Cervical back: Normal range of motion and neck supple.   Lymphadenopathy:      Cervical: No cervical adenopathy.   Skin:     General: Skin is warm and dry.      Capillary Refill: Capillary refill takes less than 2 seconds.      Coloration: Skin is pale.      Findings: No erythema or rash.   Neurological:      Mental Status: She is oriented to person, place, and time.       Significant Labs: All pertinent labs within the past 24 hours have been reviewed.    Significant Imaging: I have reviewed all pertinent imaging results/findings within the past 24 hours.

## 2022-06-18 NOTE — PROGRESS NOTES
[facility]  Cardiology  Progress Note    Patient Name: Froilan Ray  MRN: [id number]  Admission Date: 6/14/2022  Hospital Length of Stay: 3 days  Code Status: Full Code   Attending Physician: Tamara Vargas MD   Primary Care Physician: Primary Doctor No  Expected Discharge Date: 6/20/2022  Principal Problem:Encephalopathy, metabolic    Subjective:     Hospital Course:   6/18/2022:  No acute events overnight  Pt reports no chest pain  Telemetry shows patient has remained in sinus rhythm with short run of VT at 5:00 a.m.      Objective:     Vital Signs (Most Recent):  Temp: 96.9 °F (36.1 °C) (06/18/22 1522)  Pulse: 84 (06/18/22 1522)  Resp: 18 (06/18/22 1528)  BP: (!) 168/79 (06/18/22 1522)  SpO2: 96 % (06/18/22 1522)   Vital Signs (24h Range):  Temp:  [96.1 °F (35.6 °C)-97.4 °F (36.3 °C)] 96.9 °F (36.1 °C)  Pulse:  [] 84  Resp:  [16-18] 18  SpO2:  [94 %-98 %] 96 %  BP: (158-177)/(70-84) 168/79     Weight: 79.8 kg (176 lb)  Body mass index is 28.41 kg/m².     SpO2: 96 %  O2 Device (Oxygen Therapy): room air      Intake/Output Summary (Last 24 hours) at 6/18/2022 1728  Last data filed at 6/18/2022 0400  Gross per 24 hour   Intake 1000 ml   Output --   Net 1000 ml       Lines/Drains/Airways       Peripheral Intravenous Line  Duration                  Peripheral IV - Single Lumen 06/15/22 0120 18 G Anterior;Right 3 days         Peripheral IV - Single Lumen 06/16/22 1752 20 G Distal;Left;Posterior Forearm 1 day                    Physical Exam  Vitals and nursing note reviewed.   Constitutional:       Appearance: She is obese.   HENT:      Head: Normocephalic and atraumatic.      Nose: Nose normal.   Eyes:      Extraocular Movements: Extraocular movements intact.      Pupils: Pupils are equal, round, and reactive to light.   Neck:      Vascular: No carotid bruit.   Cardiovascular:      Rate and Rhythm: Normal rate and regular rhythm.   Pulmonary:      Effort: Pulmonary effort is normal.       Breath sounds: No wheezing or rales.   Abdominal:      Palpations: Abdomen is soft.   Musculoskeletal:      Cervical back: Neck supple.      Right lower leg: No edema.      Left lower leg: No edema.   Skin:     General: Skin is warm and dry.   Neurological:      General: No focal deficit present.      Mental Status: She is alert and oriented to person, place, and time.   Psychiatric:         Mood and Affect: Mood normal.         Behavior: Behavior normal.       Significant Labs: BMP:   Recent Labs   Lab 06/17/22  0533 06/18/22  0516   * 99    138   K 3.3* 3.7    110   CO2 20* 17*   BUN 19 9   CREATININE 0.6 0.6   CALCIUM 8.1* 8.5*   MG 2.1  --    , CMP   Recent Labs   Lab 06/17/22  0533 06/18/22  0516    138   K 3.3* 3.7    110   CO2 20* 17*   * 99   BUN 19 9   CREATININE 0.6 0.6   CALCIUM 8.1* 8.5*   PROT 4.7* 5.6*   ALBUMIN 1.9* 2.3*   BILITOT 0.4 0.7   ALKPHOS 81 96   AST 21 20   ALT 15 16   ANIONGAP 9 11   ESTGFRAFRICA >60 >60   EGFRNONAA >60 >60   , CBC   Recent Labs   Lab 06/17/22  0533 06/18/22  0516   WBC 8.80 7.34   HGB 8.6* 10.3*   HCT 27.4* 32.6*    163   , INR No results for input(s): INR, PROTIME in the last 48 hours., Troponin No results for input(s): TROPONINI in the last 48 hours., and All pertinent lab results from the last 24 hours have been reviewed.    Significant Imaging: Echocardiogram: Transthoracic echo (TTE) complete (Cupid Only):   Results for orders placed or performed during the hospital encounter of 06/14/22   Echo   Result Value Ref Range    BSA 1.93 m2    TDI SEPTAL 0.07 m/s    LV LATERAL E/E' RATIO 12.00 m/s    LV SEPTAL E/E' RATIO 17.14 m/s    LA WIDTH 4.70 cm    AORTIC VALVE CUSP SEPERATION 2.16 cm    TDI LATERAL 0.10 m/s    PV PEAK VELOCITY 0.97 cm/s    LVIDd 5.47 3.5 - 6.0 cm    IVS 1.19 (A) 0.6 - 1.1 cm    Posterior Wall 1.16 (A) 0.6 - 1.1 cm    Ao root annulus 3.23 cm    LVIDs 4.06 (A) 2.1 - 4.0 cm    FS 26 28 - 44 %    LA volume  "97.00 cm3    Sinus 2.79 cm    STJ 2.78 cm    Ascending aorta 3.51 cm    LV mass 262.30 g    LA size 4.04 cm    RVDD 3.13 cm    TAPSE 2.50 cm    RV S' 19.83 cm/s    Left Ventricle Relative Wall Thickness 0.42 cm    AV mean gradient 4 mmHg    AV valve area 3.17 cm2    AV Velocity Ratio 0.94     AV index (prosthetic) 0.93     MV mean gradient 1 mmHg    MV valve area p 1/2 method 5.08 cm2    MV valve area by continuity eq 2.34 cm2    E/A ratio 1.11     Mean e' 0.09 m/s    E wave deceleration time 149.32 msec    IVRT 125.59 msec    MV "A" wave duration 8.37 msec    Pulm vein S/D ratio 1.78     LVOT diameter 2.08 cm    LVOT area 3.4 cm2    LVOT peak arnaldo 1.28 m/s    LVOT peak VTI 25.10 cm    Ao peak arnaldo 1.36 m/s    Ao VTI 26.90 cm    Mr max arnaldo 0.05 m/s    LVOT stroke volume 85.25 cm3    AV peak gradient 7 mmHg    MV peak gradient 6 mmHg    E/E' ratio 14.12 m/s    MV Peak E Arnaldo 1.20 m/s    TR Max Arnaldo 2.47 m/s    MV VTI 36.44 cm    MV stenosis pressure 1/2 time 43.30 ms    MV Peak A Arnaldo 1.08 m/s    PV Peak S Arnaldo 0.71 m/s    PV Peak D Arnaldo 0.40 m/s    LV Systolic Volume 72.51 mL    LV Systolic Volume Index 38.4 mL/m2    LV Diastolic Volume 145.51 mL    LV Diastolic Volume Index 76.99 mL/m2    LA Volume Index 51.3 mL/m2    LV Mass Index 139 g/m2    RA Major Axis 4.62 cm    Left Atrium Minor Axis 5.99 cm    Left Atrium Major Axis 6.03 cm    Triscuspid Valve Regurgitation Peak Gradient 24 mmHg    LA Volume Index (Mod) 51.4 mL/m2    LA volume (mod) 97.17 cm3    RA Width 2.84 cm    Right Atrial Pressure (from IVC) 3 mmHg    EF 50 %    TV rest pulmonary artery pressure 27 mmHg    Narrative    · The left ventricle is normal in size with concentric remodeling and   normal systolic function.  · Moderate left atrial enlargement.  · The estimated ejection fraction is 50%.  · Indeterminate left ventricular diastolic function with normal left   atrial pressure.  · Normal right ventricular size with normal right ventricular systolic "   function.  · Mild mitral regurgitation.  · Normal central venous pressure (3 mmHg).  · The estimated PA systolic pressure is 27 mmHg.        Assessment and Plan:     Active Hospital Problems    Diagnosis  POA    *Encephalopathy, metabolic [G93.41]  Yes    Atrial fibrillation with RVR [I48.91]  Yes    Sepsis [A41.9]  Yes    PEDRO (acute kidney injury) [N17.9]  Yes    Iron deficiency anemia [D50.9]  Yes    HTN (hypertension) [I10]  Yes     Afib with RVR  Iron deficiency Anemia  Sepsis  PEDRO  HTN    Recommendations:  Patient remains in sinus rhythm.  Increase metoprolol to 50 mg p.o. t.i.d. for improved blood pressure control and rate control.  Continue amiodarone and Eliquis at current dosing regimen  Continue to monitor on telemetry.      Kurtis Kent MD PhD  Cardiology  [facility]

## 2022-06-18 NOTE — PT/OT/SLP PROGRESS
Physical Therapy Treatment    Patient Name:  Froilan Ray   MRN:  [id number]    Recommendations:     Discharge Recommendations:  home   Discharge Equipment Recommendations: none   Barriers to discharge: None    Assessment:     Froilan Ray is a 76 y.o. female admitted with a medical diagnosis of Encephalopathy, metabolic.  She presents with the following impairments/functional limitations:  weakness, impaired endurance, impaired self care skills, impaired functional mobilty, gait instability, impaired balance, decreased lower extremity function, pain, impaired cardiopulmonary response to activity . Reported back discomfort somewhat due to being in bed. Requested to use restroom. Ambulated to / from restroom with rw and Mod A with forward flexed posture and occasional assistance to maneuver walker.  Perform,edhygiene post void in standing with Min A. Ambulated to chair at bedside.     Rehab Prognosis: Good; patient would benefit from acute skilled PT services to address these deficits and reach maximum level of function.    Recent Surgery: * No surgery found *      Plan:     During this hospitalization, patient to be seen 6 x/week to address the identified rehab impairments via gait training, therapeutic activities, therapeutic exercises and progress toward the following goals:    · Plan of Care Expires:  07/01/22    Subjective     Chief Complaint: back pain  Patient/Family Comments/goals: none stated  Pain/Comfort:  · Pain Rating 1: 10/10  · Location - Orientation 1: generalized  · Location 1: back  · Pain Addressed 1: Pre-medicate for activity, Cessation of Activity (nurse arrived to medicate , seatted EOB.)      Objective:     Communicated with nurse Freitas prior to session.  Patient found supine with bed alarm, peripheral IV, telemetry upon PT entry to room.     General Precautions: Standard, fall   Orthopedic Precautions:N/A   Braces:    Respiratory Status: Room air     Functional Mobility:  · Bed  Mobility:     · Rolling Right: minimum assistance  · Supine to Sit: minimum assistance  · Transfers:     · Sit to Stand:  moderate assistance with rolling walker  · Gait: 15' x 2 with rw and Mod A with IV in tow.       AM-PAC 6 CLICK MOBILITY          Therapeutic Activities and Exercises:   Ambulated with rw and Mod A with forward flexed posture.    Patient left up in chair with all lines intact, call button in reach, chair alarm on, nurse Zena notified and spouse present..    GOALS:   Multidisciplinary Problems     Physical Therapy Goals        Problem: Physical Therapy    Goal Priority Disciplines Outcome Goal Variances Interventions   Physical Therapy Goal     PT, PT/OT Ongoing, Progressing     Description: Goals to be met by: 22    Patient will increase functional independence with mobility by performin. Supine to sit with [place]  2. Sit to supine with [place]  3. Sit to stand transfer with Supervision  4. Bed to chair transfer with Supervision using Rolling Walker  5. Gait  x 150 feet with Supervision using Rolling Walker.                      Time Tracking:     PT Received On: 22  PT Start Time: 0948     PT Stop Time: 1004  PT Total Time (min): 16 min     Billable Minutes: Gait Training 16min    Treatment Type: Treatment  PT/PTA: PTA     PTA Visit Number: 1     2022

## 2022-06-18 NOTE — PROGRESS NOTES
Re-Consult    Pharmacokinetic Initial Assessment: IV Vancomycin    Assessment/Plan:    Initiate intravenous vancomycin with a dose of 1500 mg every 24 hours  Desired empiric serum trough concentration is 15 to 20 mcg/mL  Draw vancomycin trough level 60 min prior to third dose on 6/20 at approximately 0830  Pharmacy will continue to follow and monitor vancomycin.      Please contact pharmacy at extension [phone] with any questions regarding this assessment.     Thank you for the consult,   Margareth Garcianina       Patient brief summary:  Froilan Ray is a 76 y.o. female initiated on antimicrobial therapy with IV Vancomycin for treatment of suspected bacteremia    Drug Allergies:   Review of patient's allergies indicates:  No Known Allergies    Actual Body Weight:   79.8    Renal Function:   Estimated Creatinine Clearance: 87.7 mL/min (based on SCr of 0.6 mg/dL).,     Dialysis Method (if applicable):  N/A    CBC (last 72 hours):  Recent Labs   Lab Result Units 06/16/22  0445 06/16/22  1821 06/17/22  0533 06/18/22  0516   WBC K/uL 8.91  --  8.80 7.34   Hemoglobin g/dL 8.8*  --  8.6* 10.3*   Hematocrit % 27.0* 29.7* 27.4* 32.6*   Platelets K/uL 171  --  163 163   Gran % % 62.0  --  79.0* 83.0*   Lymph % % 15.0*  --  4.0* 8.0*   Mono % % 5.0  --  6.0 4.0   Eosinophil % % 0.0  --  2.0 2.0   Basophil % % 0.0  --  0.0 0.0   Differential Method  Manual  --  Manual Manual       Metabolic Panel (last 72 hours):  Recent Labs   Lab Result Units 06/16/22  0444 06/17/22  0533 06/18/22  0516   Sodium mmol/L 139 138 138   Potassium mmol/L 3.6 3.3* 3.7   Chloride mmol/L 109 109 110   CO2 mmol/L 21* 20* 17*   Glucose mg/dL 118* 113* 99   BUN mg/dL 30* 19 9   Creatinine mg/dL 0.8 0.6 0.6   Albumin g/dL 2.1* 1.9* 2.3*   Total Bilirubin mg/dL 0.6 0.4 0.7   Alkaline Phosphatase U/L 66 81 96   AST U/L 23 21 20   ALT U/L 15 15 16   Magnesium mg/dL 2.1 2.1  --    Phosphorus mg/dL 3.3 1.9*  --        Drug levels (last 3 results):  Recent Labs    Lab Result Units 06/15/22  1758 06/16/22  1153   Vancomycin, Random ug/mL 11.5 13.6       Microbiologic Results:  Microbiology Results (last 7 days)       Procedure Component Value Units Date/Time    Blood culture [[id number]]     Order Status: Sent Specimen: Blood     Blood culture x two cultures. Draw prior to antibiotics. [[id number]] Collected: 06/14/22 2358    Order Status: Completed Specimen: Blood from Peripheral, Right Updated: 06/18/22 0624     Blood Culture, Routine Gram stain aer bottle: Gram positive cocci in clusters resembling Staph       Results called to and read back by: Yanelis Edwards 06/18/2022  06:24    Narrative:      Aerobic and anaerobic    Blood culture x two cultures. Draw prior to antibiotics. [[id number]] Collected: 06/14/22 2357    Order Status: Completed Specimen: Blood Updated: 06/17/22 1212     Blood Culture, Routine No Growth to date      No Growth to date      No Growth to date    Narrative:      Aerobic and anaerobic

## 2022-06-18 NOTE — PLAN OF CARE
Problem: Physical Therapy  Goal: Physical Therapy Goal  Description: Goals to be met by: 22    Patient will increase functional independence with mobility by performin. Supine to sit with [place]  2. Sit to supine with [place]  3. Sit to stand transfer with Supervision  4. Bed to chair transfer with Supervision using Rolling Walker  5. Gait  x 150 feet with Supervision using Rolling Walker.     Outcome: Ongoing, Progressing   Ambulate with rw and assistance for safety.

## 2022-06-18 NOTE — PROGRESS NOTES
[facility] Hospital Medicine  Progress Note    Patient Name: Froilan Ray  MRN: [id number]  Patient Class: IP- Inpatient   Admission Date: 6/14/2022  Length of Stay: 3 days  Attending Physician: Tamara Vargas MD  Primary Care Provider: Primary Doctor No        Subjective:     Principal Problem:Encephalopathy, metabolic        HPI:  Froilan Ray is a 76-year-old female who presents emergency room for evaluation of nausea, vomiting, abdominal pain, and altered mental status.  EMS reported to ER staff that upon their arrival patient's blood pressure was 74 systolic.  At the time of my exam patient was altered.  No family available at bedside.  All information was obtained from [facility] medical records as well as ER physician.  Previous medical history includes left hip fracture, drug induced constipation, hypertension, iron deficiency anemia, and seizure disorder.  ER workup:  CBC with mild anemia.  CMP with BUN of 31 creatinine 1.9 with bicarb of 18. Initial lactic acid was 1.4.  Second lactic acid was 3.6.  ABG with pH of 7.3, PO2 of 65, bicarb 19, pCO2 of 38. CT of the head was negative for any acute findings.  CT abdomen and pelvis demonstrates a colonic obstruction secondary to constipation of the rectosigmoid junction.  Patient was started on septic protocol.  Patient was given IV fluids.  Patient started on  vancomycin and Zosyn.  Patient admitted to Hospital Medicine for observation management.  Patient be placed in ICU.  Will consult Neurology as well as GI.      Overview/Hospital Course:  No notes on file    Interval History: Patient seen and examined. Doing well. Blood culture with possible staph, placed back on Vanc until we see if it is a contaminant    Review of Systems   Constitutional:  Negative for chills and fever.   HENT:  Negative for congestion and rhinorrhea.    Respiratory:  Negative for cough, shortness of breath and wheezing.    Cardiovascular:  Negative for chest  pain and leg swelling.   Gastrointestinal:  Negative for abdominal distention, abdominal pain, nausea and vomiting.   Endocrine: Negative for cold intolerance and heat intolerance.   Genitourinary:  Negative for dysuria and urgency.   Musculoskeletal:  Negative for arthralgias and neck stiffness.   Skin:  Negative for rash and wound.   Neurological:  Negative for dizziness and weakness.   Objective:     Vital Signs (Most Recent):  Temp: 97 °F (36.1 °C) (06/18/22 0726)  Pulse: 102 (06/18/22 0744)  Resp: 16 (06/18/22 0951)  BP: (!) 166/78 (06/18/22 0726)  SpO2: 98 % (06/18/22 0937)   Vital Signs (24h Range):  Temp:  [96.1 °F (35.6 °C)-97.4 °F (36.3 °C)] 97 °F (36.1 °C)  Pulse:  [] 102  Resp:  [16-30] 16  SpO2:  [90 %-100 %] 98 %  BP: (137-189)/(65-84) 166/78     Weight: 79.8 kg (176 lb)  Body mass index is 28.41 kg/m².    Intake/Output Summary (Last 24 hours) at 6/18/2022 1011  Last data filed at 6/18/2022 0400  Gross per 24 hour   Intake 2023.98 ml   Output --   Net 2023.98 ml        Physical Exam  Vitals and nursing note reviewed.   Constitutional:       General: She is not in acute distress.     Appearance: She is well-developed. She is not diaphoretic.   HENT:      Head: Normocephalic.      Mouth/Throat:      Mouth: Mucous membranes are moist.   Eyes:      General: No scleral icterus.     Conjunctiva/sclera: Conjunctivae normal.      Pupils: Pupils are equal, round, and reactive to light.   Neck:      Vascular: No JVD.   Cardiovascular:      Rate and Rhythm: Normal rate and regular rhythm.      Heart sounds: Normal heart sounds. No murmur heard.    No friction rub. No gallop.   Pulmonary:      Effort: Pulmonary effort is normal. No respiratory distress.      Breath sounds: Normal breath sounds. No wheezing or rales.   Abdominal:      General: Bowel sounds are normal. There is no distension.      Palpations: Abdomen is soft.      Tenderness: There is no abdominal tenderness. There is no guarding or rebound.    Musculoskeletal:         General: No tenderness. Normal range of motion.      Cervical back: Normal range of motion and neck supple.   Lymphadenopathy:      Cervical: No cervical adenopathy.   Skin:     General: Skin is warm and dry.      Capillary Refill: Capillary refill takes less than 2 seconds.      Coloration: Skin is pale.      Findings: No erythema or rash.   Neurological:      Mental Status: She is oriented to person, place, and time.       Significant Labs: All pertinent labs within the past 24 hours have been reviewed.    Significant Imaging: I have reviewed all pertinent imaging results/findings within the past 24 hours.        Assessment/Plan:      * Encephalopathy, metabolic  Acute problem  Improving  Neurology following        Atrial fibrillation with RVR  Monitor   Amiodarone per Cardiology, transitioned to PO  Eliquis 2.5 mg BID per Caridology, ok per GI    PEDRO (acute kidney injury)  Acute problem  Patient with acute kidney injury likely d/t IVVD/Dehydration  caused by dehydration. PEDRO is currently improving. Labs reviewed- Renal function/electrolytes with Estimated Creatinine Clearance: 65.9 mL/min (based on SCr of 0.8 mg/dL). according to latest data. Monitor urine output and serial BMP and adjust therapy as needed. Avoid nephrotoxins and renally dose meds for GFR listed above.       Sepsis  + blood culture, restarted Vanc until we see if it is a contaminant      Iron deficiency anemia  Chronic problem  Received iron infusion outpatient day PTA  Monitor CBC closely      HTN (hypertension)  Chronic  Chronic, controlled.  Latest blood pressure and vitals reviewed-   Temp:  [97.2 °F (36.2 °C)-98.6 °F (37 °C)]   Pulse:  []   Resp:  [17-48]   BP: ()/(54-84)   SpO2:  [80 %-100 %] .   Home meds for hypertension were reviewed and noted below.   Hypertension Medications             amlodipine-benazepril 5-20 mg (LOTREL) 5-20 mg per capsule Take 1 capsule by mouth once daily.    furosemide  (LASIX) 40 MG tablet Take 40 mg by mouth daily as needed.          While in the hospital, will manage blood pressure as follows; Continue home antihypertensive regimen    Will utilize p.r.n. blood pressure medication only if patient's blood pressure greater than  180/110 and she develops symptoms such as worsening chest pain or shortness of breath.        VTE Risk Mitigation (From admission, onward)         Ordered     apixaban tablet 2.5 mg  2 times daily         06/17/22 1035     IP VTE HIGH RISK PATIENT  Once         06/15/22 0401     Place sequential compression device  Until discontinued         06/15/22 0401     Place CARI hose  Until discontinued         06/15/22 0401                Discharge Planning   BAILEY: 6/20/2022     Code Status: Full Code   Is the patient medically ready for discharge?:     Reason for patient still in hospital (select all that apply): Patient trending condition and Treatment  Discharge Plan A: Home with family                  Tamara Vargas MD  Department of Hospital Medicine   [facility]

## 2022-06-18 NOTE — HOSPITAL COURSE
6/19/2022:  Mrs. Ray reports no chest pain.  Main complaint is chronic back pain  Pt's blood pressure remains elevated, which is likely due to uncontrolled back pain  Telemetry shows patient has remained in sinus rhythm with no acute events      6/18/2022:  No acute events overnight  Pt reports no chest pain  Telemetry shows patient has remained in sinus rhythm with short run of VT at 5:00 a.m.

## 2022-06-18 NOTE — PT/OT/SLP PROGRESS
Occupational Therapy      Patient Name:  Froilan Ray   MRN:  [id number]    Patient not seen today secondary to patient was unwilling to participate. Will follow-up 6/19/2022.    6/18/2022

## 2022-06-18 NOTE — PLAN OF CARE
Plan of care reviewed. IV sites intact and patent. Pain managed with PRN medication. Tele in place and being monitored. Patient up to BSC with assistance. Alert and oriented. VSS. Safety maintained. Call light in reach and patient instructed to call for assistance. Will continue to monitor.

## 2022-06-19 PROBLEM — E87.6 HYPOKALEMIA: Status: ACTIVE | Noted: 2022-06-19

## 2022-06-19 LAB
ALBUMIN SERPL BCP-MCNC: 1.6 G/DL (ref 3.5–5.2)
ALP SERPL-CCNC: 61 U/L (ref 55–135)
ALT SERPL W/O P-5'-P-CCNC: 9 U/L (ref 10–44)
ANION GAP SERPL CALC-SCNC: 6 MMOL/L (ref 8–16)
ANION GAP SERPL CALC-SCNC: 9 MMOL/L (ref 8–16)
AST SERPL-CCNC: 11 U/L (ref 10–40)
BASOPHILS # BLD AUTO: ABNORMAL K/UL (ref 0–0.2)
BASOPHILS NFR BLD: 0 % (ref 0–1.9)
BILIRUB SERPL-MCNC: 0.3 MG/DL (ref 0.1–1)
BUN SERPL-MCNC: 3 MG/DL (ref 8–23)
BUN SERPL-MCNC: 4 MG/DL (ref 8–23)
CA-I BLDV-SCNC: 1.13 MMOL/L (ref 1.06–1.42)
CALCIUM SERPL-MCNC: 5.7 MG/DL (ref 8.7–10.5)
CALCIUM SERPL-MCNC: 7.6 MG/DL (ref 8.7–10.5)
CHLORIDE SERPL-SCNC: 107 MMOL/L (ref 95–110)
CHLORIDE SERPL-SCNC: 120 MMOL/L (ref 95–110)
CO2 SERPL-SCNC: 17 MMOL/L (ref 23–29)
CO2 SERPL-SCNC: 21 MMOL/L (ref 23–29)
CREAT SERPL-MCNC: 0.4 MG/DL (ref 0.5–1.4)
CREAT SERPL-MCNC: 0.5 MG/DL (ref 0.5–1.4)
DIFFERENTIAL METHOD: ABNORMAL
EOSINOPHIL # BLD AUTO: ABNORMAL K/UL (ref 0–0.5)
EOSINOPHIL NFR BLD: 0 % (ref 0–8)
ERYTHROCYTE [DISTWIDTH] IN BLOOD BY AUTOMATED COUNT: 15.1 % (ref 11.5–14.5)
EST. GFR  (AFRICAN AMERICAN): >60 ML/MIN/1.73 M^2
EST. GFR  (AFRICAN AMERICAN): >60 ML/MIN/1.73 M^2
EST. GFR  (NON AFRICAN AMERICAN): >60 ML/MIN/1.73 M^2
EST. GFR  (NON AFRICAN AMERICAN): >60 ML/MIN/1.73 M^2
GLUCOSE SERPL-MCNC: 70 MG/DL (ref 70–110)
GLUCOSE SERPL-MCNC: 86 MG/DL (ref 70–110)
HCT VFR BLD AUTO: 25.8 % (ref 37–48.5)
HGB BLD-MCNC: 8.5 G/DL (ref 12–16)
IMM GRANULOCYTES # BLD AUTO: ABNORMAL K/UL (ref 0–0.04)
IMM GRANULOCYTES NFR BLD AUTO: ABNORMAL % (ref 0–0.5)
LYMPHOCYTES # BLD AUTO: ABNORMAL K/UL (ref 1–4.8)
LYMPHOCYTES NFR BLD: 19 % (ref 18–48)
MAGNESIUM SERPL-MCNC: 1.6 MG/DL (ref 1.6–2.6)
MCH RBC QN AUTO: 29.6 PG (ref 27–31)
MCHC RBC AUTO-ENTMCNC: 32.9 G/DL (ref 32–36)
MCV RBC AUTO: 90 FL (ref 82–98)
MONOCYTES # BLD AUTO: ABNORMAL K/UL (ref 0.3–1)
MONOCYTES NFR BLD: 7 % (ref 4–15)
NEUTROPHILS NFR BLD: 74 % (ref 38–73)
NRBC BLD-RTO: 0 /100 WBC
PLATELET # BLD AUTO: 165 K/UL (ref 150–450)
PLATELET BLD QL SMEAR: ABNORMAL
PMV BLD AUTO: 10.8 FL (ref 9.2–12.9)
POLYCHROMASIA BLD QL SMEAR: ABNORMAL
POTASSIUM SERPL-SCNC: 2.3 MMOL/L (ref 3.5–5.1)
POTASSIUM SERPL-SCNC: 3.6 MMOL/L (ref 3.5–5.1)
PROT SERPL-MCNC: 3.5 G/DL (ref 6–8.4)
RBC # BLD AUTO: 2.87 M/UL (ref 4–5.4)
SODIUM SERPL-SCNC: 137 MMOL/L (ref 136–145)
SODIUM SERPL-SCNC: 143 MMOL/L (ref 136–145)
VANCOMYCIN TROUGH SERPL-MCNC: 7.4 UG/ML (ref 10–22)
WBC # BLD AUTO: 4.45 K/UL (ref 3.9–12.7)

## 2022-06-19 PROCEDURE — 99900035 HC TECH TIME PER 15 MIN (STAT)

## 2022-06-19 PROCEDURE — 63600175 PHARM REV CODE 636 W HCPCS: Performed by: HOSPITALIST

## 2022-06-19 PROCEDURE — 25000003 PHARM REV CODE 250: Performed by: HOSPITALIST

## 2022-06-19 PROCEDURE — 25000003 PHARM REV CODE 250: Performed by: NURSE PRACTITIONER

## 2022-06-19 PROCEDURE — 63600175 PHARM REV CODE 636 W HCPCS: Performed by: NURSE PRACTITIONER

## 2022-06-19 PROCEDURE — 85007 BL SMEAR W/DIFF WBC COUNT: CPT | Performed by: HOSPITALIST

## 2022-06-19 PROCEDURE — 83735 ASSAY OF MAGNESIUM: CPT

## 2022-06-19 PROCEDURE — 36415 COLL VENOUS BLD VENIPUNCTURE: CPT

## 2022-06-19 PROCEDURE — 99233 SBSQ HOSP IP/OBS HIGH 50: CPT | Mod: ,,, | Performed by: INTERNAL MEDICINE

## 2022-06-19 PROCEDURE — 80048 BASIC METABOLIC PNL TOTAL CA: CPT | Mod: XB | Performed by: HOSPITALIST

## 2022-06-19 PROCEDURE — 82330 ASSAY OF CALCIUM: CPT

## 2022-06-19 PROCEDURE — 80053 COMPREHEN METABOLIC PANEL: CPT | Performed by: HOSPITALIST

## 2022-06-19 PROCEDURE — 80202 ASSAY OF VANCOMYCIN: CPT | Performed by: HOSPITALIST

## 2022-06-19 PROCEDURE — 36415 COLL VENOUS BLD VENIPUNCTURE: CPT | Performed by: HOSPITALIST

## 2022-06-19 PROCEDURE — 12000002 HC ACUTE/MED SURGE SEMI-PRIVATE ROOM

## 2022-06-19 PROCEDURE — 94761 N-INVAS EAR/PLS OXIMETRY MLT: CPT

## 2022-06-19 PROCEDURE — 99233 PR SUBSEQUENT HOSPITAL CARE,LEVL III: ICD-10-PCS | Mod: ,,, | Performed by: INTERNAL MEDICINE

## 2022-06-19 PROCEDURE — 85027 COMPLETE CBC AUTOMATED: CPT | Performed by: HOSPITALIST

## 2022-06-19 PROCEDURE — 25000003 PHARM REV CODE 250

## 2022-06-19 RX ORDER — CALCIUM GLUCONATE 20 MG/ML
1 INJECTION, SOLUTION INTRAVENOUS ONCE
Status: COMPLETED | OUTPATIENT
Start: 2022-06-19 | End: 2022-06-19

## 2022-06-19 RX ORDER — METOPROLOL TARTRATE 50 MG/1
50 TABLET ORAL 3 TIMES DAILY
Status: DISCONTINUED | OUTPATIENT
Start: 2022-06-19 | End: 2022-06-20 | Stop reason: HOSPADM

## 2022-06-19 RX ORDER — HYDRALAZINE HYDROCHLORIDE 20 MG/ML
10 INJECTION INTRAMUSCULAR; INTRAVENOUS EVERY 6 HOURS PRN
Status: DISCONTINUED | OUTPATIENT
Start: 2022-06-19 | End: 2022-06-20 | Stop reason: HOSPADM

## 2022-06-19 RX ORDER — LIDOCAINE 50 MG/G
1 PATCH TOPICAL
Status: DISCONTINUED | OUTPATIENT
Start: 2022-06-19 | End: 2022-06-20 | Stop reason: HOSPADM

## 2022-06-19 RX ADMIN — SODIUM CHLORIDE: 0.9 INJECTION, SOLUTION INTRAVENOUS at 01:06

## 2022-06-19 RX ADMIN — MELATONIN TAB 3 MG 9 MG: 3 TAB at 08:06

## 2022-06-19 RX ADMIN — OXYCODONE AND ACETAMINOPHEN 1 TABLET: 10; 325 TABLET ORAL at 04:06

## 2022-06-19 RX ADMIN — CALCIUM GLUCONATE 1 G: 20 INJECTION, SOLUTION INTRAVENOUS at 09:06

## 2022-06-19 RX ADMIN — ONDANSETRON 4 MG: 2 INJECTION INTRAMUSCULAR; INTRAVENOUS at 08:06

## 2022-06-19 RX ADMIN — APIXABAN 2.5 MG: 2.5 TABLET, FILM COATED ORAL at 08:06

## 2022-06-19 RX ADMIN — MUPIROCIN: 20 OINTMENT TOPICAL at 09:06

## 2022-06-19 RX ADMIN — HYDRALAZINE HYDROCHLORIDE 10 MG: 20 INJECTION INTRAMUSCULAR; INTRAVENOUS at 05:06

## 2022-06-19 RX ADMIN — LIDOCAINE 1 PATCH: 50 PATCH CUTANEOUS at 11:06

## 2022-06-19 RX ADMIN — MUPIROCIN: 20 OINTMENT TOPICAL at 08:06

## 2022-06-19 RX ADMIN — METOPROLOL TARTRATE 50 MG: 50 TABLET, FILM COATED ORAL at 08:06

## 2022-06-19 RX ADMIN — POLYETHYLENE GLYCOL 3350 17 G: 17 POWDER, FOR SOLUTION ORAL at 08:06

## 2022-06-19 RX ADMIN — SODIUM CHLORIDE: 0.9 INJECTION, SOLUTION INTRAVENOUS at 11:06

## 2022-06-19 RX ADMIN — AMIODARONE HYDROCHLORIDE 200 MG: 200 TABLET ORAL at 09:06

## 2022-06-19 RX ADMIN — POTASSIUM BICARBONATE 50 MEQ: 977.5 TABLET, EFFERVESCENT ORAL at 04:06

## 2022-06-19 RX ADMIN — METOPROLOL TARTRATE 50 MG: 50 TABLET, FILM COATED ORAL at 09:06

## 2022-06-19 RX ADMIN — APIXABAN 2.5 MG: 2.5 TABLET, FILM COATED ORAL at 09:06

## 2022-06-19 RX ADMIN — METOPROLOL TARTRATE 50 MG: 50 TABLET, FILM COATED ORAL at 03:06

## 2022-06-19 RX ADMIN — DOCUSATE SODIUM 100 MG: 100 CAPSULE, LIQUID FILLED ORAL at 08:06

## 2022-06-19 RX ADMIN — POTASSIUM BICARBONATE 60 MEQ: 391 TABLET, EFFERVESCENT ORAL at 06:06

## 2022-06-19 RX ADMIN — OXYCODONE AND ACETAMINOPHEN 1 TABLET: 10; 325 TABLET ORAL at 11:06

## 2022-06-19 RX ADMIN — POTASSIUM BICARBONATE 60 MEQ: 391 TABLET, EFFERVESCENT ORAL at 09:06

## 2022-06-19 RX ADMIN — ACETAMINOPHEN 650 MG: 325 TABLET ORAL at 07:06

## 2022-06-19 NOTE — PROGRESS NOTES
[facility] Hospital Medicine  Progress Note    Patient Name: Froilan Ray  MRN: [id number]  Patient Class: IP- Inpatient   Admission Date: 6/14/2022  Length of Stay: 4 days  Attending Physician: Tamara Vargas MD  Primary Care Provider: Primary Doctor No        Subjective:     Principal Problem:Encephalopathy, metabolic        HPI:  Froilan Ray is a 76-year-old female who presents emergency room for evaluation of nausea, vomiting, abdominal pain, and altered mental status.  EMS reported to ER staff that upon their arrival patient's blood pressure was 74 systolic.  At the time of my exam patient was altered.  No family available at bedside.  All information was obtained from [facility] medical records as well as ER physician.  Previous medical history includes left hip fracture, drug induced constipation, hypertension, iron deficiency anemia, and seizure disorder.  ER workup:  CBC with mild anemia.  CMP with BUN of 31 creatinine 1.9 with bicarb of 18. Initial lactic acid was 1.4.  Second lactic acid was 3.6.  ABG with pH of 7.3, PO2 of 65, bicarb 19, pCO2 of 38. CT of the head was negative for any acute findings.  CT abdomen and pelvis demonstrates a colonic obstruction secondary to constipation of the rectosigmoid junction.  Patient was started on septic protocol.  Patient was given IV fluids.  Patient started on  vancomycin and Zosyn.  Patient admitted to Hospital Medicine for observation management.  Patient be placed in ICU.  Will consult Neurology as well as GI.      Overview/Hospital Course:  No notes on file    Interval History: Patient seen and examined. Emesis this AM. K low, being replaced. Blood culture with contaminant, Abx stopped.    Review of Systems   Constitutional:  Negative for chills and fever.   HENT:  Negative for congestion and rhinorrhea.    Respiratory:  Negative for cough, shortness of breath and wheezing.    Cardiovascular:  Negative for chest pain and leg swelling.    Gastrointestinal:  Positive for nausea and vomiting. Negative for abdominal distention and abdominal pain.   Endocrine: Negative for cold intolerance and heat intolerance.   Genitourinary:  Negative for dysuria and urgency.   Musculoskeletal:  Negative for arthralgias and neck stiffness.   Skin:  Negative for rash and wound.   Neurological:  Negative for dizziness and weakness.   Objective:     Vital Signs (Most Recent):  Temp: 97.4 °F (36.3 °C) (06/19/22 0752)  Pulse: 87 (06/19/22 0752)  Resp: 17 (06/19/22 0752)  BP: (!) 178/86 (06/19/22 0752)  SpO2: 97 % (06/19/22 0903)   Vital Signs (24h Range):  Temp:  [96.5 °F (35.8 °C)-98 °F (36.7 °C)] 97.4 °F (36.3 °C)  Pulse:  [82-96] 87  Resp:  [16-18] 17  SpO2:  [93 %-98 %] 97 %  BP: (161-181)/(75-86) 178/86     Weight: 79.8 kg (176 lb)  Body mass index is 28.41 kg/m².    Intake/Output Summary (Last 24 hours) at 6/19/2022 0936  Last data filed at 6/19/2022 0709  Gross per 24 hour   Intake 2175.19 ml   Output --   Net 2175.19 ml        Physical Exam  Vitals and nursing note reviewed.   Constitutional:       General: She is not in acute distress.     Appearance: She is well-developed. She is not diaphoretic.   HENT:      Head: Normocephalic.      Mouth/Throat:      Mouth: Mucous membranes are moist.   Eyes:      General: No scleral icterus.     Conjunctiva/sclera: Conjunctivae normal.      Pupils: Pupils are equal, round, and reactive to light.   Neck:      Vascular: No JVD.   Cardiovascular:      Rate and Rhythm: Normal rate and regular rhythm.      Heart sounds: Normal heart sounds. No murmur heard.    No friction rub. No gallop.   Pulmonary:      Effort: Pulmonary effort is normal. No respiratory distress.      Breath sounds: Normal breath sounds. No wheezing or rales.   Abdominal:      General: Bowel sounds are normal. There is no distension.      Palpations: Abdomen is soft.      Tenderness: There is no abdominal tenderness. There is no guarding or rebound.    Musculoskeletal:         General: No tenderness. Normal range of motion.      Cervical back: Normal range of motion and neck supple.   Lymphadenopathy:      Cervical: No cervical adenopathy.   Skin:     General: Skin is warm and dry.      Capillary Refill: Capillary refill takes less than 2 seconds.      Coloration: Skin is pale.      Findings: No erythema or rash.   Neurological:      Mental Status: She is oriented to person, place, and time.       Significant Labs: All pertinent labs within the past 24 hours have been reviewed.    Significant Imaging: I have reviewed all pertinent imaging results/findings within the past 24 hours.        Assessment/Plan:      * Encephalopathy, metabolic  Acute problem  Improving  Neurology following        Hypokalemia  Patient has hypokalemia which is currently controlled. Last electrolytes reviewed- Recent Labs   Lab 06/18/22  0516 06/19/22  0356   K 3.7 2.3*   . Will replace potassium and monitor electrolytes closely.         Atrial fibrillation with RVR  Monitor   Amiodarone per Cardiology, transitioned to PO  Eliquis 2.5 mg BID per Caridology, ok per GI    PEDRO (acute kidney injury)  Acute problem  Patient with acute kidney injury likely d/t IVVD/Dehydration  caused by dehydration. PEDRO is currently improving. Labs reviewed- Renal function/electrolytes with Estimated Creatinine Clearance: 65.9 mL/min (based on SCr of 0.8 mg/dL). according to latest data. Monitor urine output and serial BMP and adjust therapy as needed. Avoid nephrotoxins and renally dose meds for GFR listed above.       Sepsis  Monitor off antibiotics      Iron deficiency anemia  Chronic problem  Received iron infusion outpatient day PTA  Monitor CBC closely      HTN (hypertension)  Chronic  Chronic, controlled.  Latest blood pressure and vitals reviewed-   Temp:  [97.2 °F (36.2 °C)-98.6 °F (37 °C)]   Pulse:  []   Resp:  [17-48]   BP: ()/(54-84)   SpO2:  [80 %-100 %] .   Home meds for hypertension  were reviewed and noted below.   Hypertension Medications             amlodipine-benazepril 5-20 mg (LOTREL) 5-20 mg per capsule Take 1 capsule by mouth once daily.    furosemide (LASIX) 40 MG tablet Take 40 mg by mouth daily as needed.          While in the hospital, will manage blood pressure as follows; Continue home antihypertensive regimen    Will utilize p.r.n. blood pressure medication only if patient's blood pressure greater than  180/110 and she develops symptoms such as worsening chest pain or shortness of breath.        VTE Risk Mitigation (From admission, onward)         Ordered     apixaban tablet 2.5 mg  2 times daily         06/17/22 1035     IP VTE HIGH RISK PATIENT  Once         06/15/22 0401     Place sequential compression device  Until discontinued         06/15/22 0401     Place CARI hose  Until discontinued         06/15/22 0401                Discharge Planning   BAILEY: 6/20/2022     Code Status: Full Code   Is the patient medically ready for discharge?:     Reason for patient still in hospital (select all that apply): Patient trending condition and Treatment  Discharge Plan A: Home with family                  Tamara Vargas MD  Department of Hospital Medicine   [facility]

## 2022-06-19 NOTE — SUBJECTIVE & OBJECTIVE
Interval History: Patient seen and examined. Emesis this AM. K low, being replaced. Blood culture with contaminant, Abx stopped.    Review of Systems   Constitutional:  Negative for chills and fever.   HENT:  Negative for congestion and rhinorrhea.    Respiratory:  Negative for cough, shortness of breath and wheezing.    Cardiovascular:  Negative for chest pain and leg swelling.   Gastrointestinal:  Positive for nausea and vomiting. Negative for abdominal distention and abdominal pain.   Endocrine: Negative for cold intolerance and heat intolerance.   Genitourinary:  Negative for dysuria and urgency.   Musculoskeletal:  Negative for arthralgias and neck stiffness.   Skin:  Negative for rash and wound.   Neurological:  Negative for dizziness and weakness.   Objective:     Vital Signs (Most Recent):  Temp: 97.4 °F (36.3 °C) (06/19/22 0752)  Pulse: 87 (06/19/22 0752)  Resp: 17 (06/19/22 0752)  BP: (!) 178/86 (06/19/22 0752)  SpO2: 97 % (06/19/22 0903)   Vital Signs (24h Range):  Temp:  [96.5 °F (35.8 °C)-98 °F (36.7 °C)] 97.4 °F (36.3 °C)  Pulse:  [82-96] 87  Resp:  [16-18] 17  SpO2:  [93 %-98 %] 97 %  BP: (161-181)/(75-86) 178/86     Weight: 79.8 kg (176 lb)  Body mass index is 28.41 kg/m².    Intake/Output Summary (Last 24 hours) at 6/19/2022 0936  Last data filed at 6/19/2022 0709  Gross per 24 hour   Intake 2175.19 ml   Output --   Net 2175.19 ml        Physical Exam  Vitals and nursing note reviewed.   Constitutional:       General: She is not in acute distress.     Appearance: She is well-developed. She is not diaphoretic.   HENT:      Head: Normocephalic.      Mouth/Throat:      Mouth: Mucous membranes are moist.   Eyes:      General: No scleral icterus.     Conjunctiva/sclera: Conjunctivae normal.      Pupils: Pupils are equal, round, and reactive to light.   Neck:      Vascular: No JVD.   Cardiovascular:      Rate and Rhythm: Normal rate and regular rhythm.      Heart sounds: Normal heart sounds. No murmur  heard.    No friction rub. No gallop.   Pulmonary:      Effort: Pulmonary effort is normal. No respiratory distress.      Breath sounds: Normal breath sounds. No wheezing or rales.   Abdominal:      General: Bowel sounds are normal. There is no distension.      Palpations: Abdomen is soft.      Tenderness: There is no abdominal tenderness. There is no guarding or rebound.   Musculoskeletal:         General: No tenderness. Normal range of motion.      Cervical back: Normal range of motion and neck supple.   Lymphadenopathy:      Cervical: No cervical adenopathy.   Skin:     General: Skin is warm and dry.      Capillary Refill: Capillary refill takes less than 2 seconds.      Coloration: Skin is pale.      Findings: No erythema or rash.   Neurological:      Mental Status: She is oriented to person, place, and time.       Significant Labs: All pertinent labs within the past 24 hours have been reviewed.    Significant Imaging: I have reviewed all pertinent imaging results/findings within the past 24 hours.

## 2022-06-19 NOTE — NURSING
Pt is A&O (x4). IV and tele intact. Pt reports no pain at this moment. Report given to HEATHER Ponce. Discussed the following: Pt has Acu scheduled however there are no sliding scale. Per the pt, she's not a diabetic. BGs have been good no coverage required. Pt uses bedside commode. Complains of chronic back pain intermittently.  All questions answered. Nurse verbalized understanding.

## 2022-06-19 NOTE — PLAN OF CARE
"The patient refused her blood glucose level this HS. She states, "I'm not getting my finger picked again because they are getting sore. I am not Diabetic and never was". Explained the risks and benefits to her. Spoke with the NP and she discontinues the blood glucose levels. She is up with one assist to the bathroom this shift. Able to make needs known.  "

## 2022-06-19 NOTE — NURSING
Critical Ca 5.7, K 2.3. ADIS Tam notified. PRN orders to replace K. Ordered labs for ionized Ca and mag.

## 2022-06-19 NOTE — PROGRESS NOTES
Froilan Ray [id number] is a 76 y.o. female who had been consulted for vancomycin dosing.    Vancomycin has been discontinued.  Pharmacy consult for vancomycin dosing in no longer required.      Thank you for allowing us to participate in this patient's care.     Margareth Hernandez

## 2022-06-19 NOTE — ASSESSMENT & PLAN NOTE
Patient has hypokalemia which is currently controlled. Last electrolytes reviewed- Recent Labs   Lab 06/18/22  0516 06/19/22  0356   K 3.7 2.3*   . Will replace potassium and monitor electrolytes closely.

## 2022-06-20 ENCOUNTER — TELEPHONE (OUTPATIENT)
Dept: CARDIOLOGY | Facility: CLINIC | Age: 77
End: 2022-06-20
Payer: MEDICARE

## 2022-06-20 VITALS
SYSTOLIC BLOOD PRESSURE: 146 MMHG | HEART RATE: 73 BPM | DIASTOLIC BLOOD PRESSURE: 72 MMHG | HEIGHT: 66 IN | WEIGHT: 167.31 LBS | OXYGEN SATURATION: 98 % | BODY MASS INDEX: 26.89 KG/M2 | TEMPERATURE: 97 F | RESPIRATION RATE: 17 BRPM

## 2022-06-20 LAB
ALBUMIN SERPL BCP-MCNC: 2.3 G/DL (ref 3.5–5.2)
ALP SERPL-CCNC: 82 U/L (ref 55–135)
ALT SERPL W/O P-5'-P-CCNC: 12 U/L (ref 10–44)
ANION GAP SERPL CALC-SCNC: 12 MMOL/L (ref 8–16)
AST SERPL-CCNC: 16 U/L (ref 10–40)
BACTERIA BLD CULT: ABNORMAL
BACTERIA BLD CULT: NORMAL
BASOPHILS NFR BLD: 0 % (ref 0–1.9)
BILIRUB SERPL-MCNC: 0.4 MG/DL (ref 0.1–1)
BUN SERPL-MCNC: 4 MG/DL (ref 8–23)
CALCIUM SERPL-MCNC: 7.7 MG/DL (ref 8.7–10.5)
CHLORIDE SERPL-SCNC: 107 MMOL/L (ref 95–110)
CO2 SERPL-SCNC: 20 MMOL/L (ref 23–29)
CREAT SERPL-MCNC: 0.6 MG/DL (ref 0.5–1.4)
DIFFERENTIAL METHOD: ABNORMAL
EOSINOPHIL NFR BLD: 1 % (ref 0–8)
ERYTHROCYTE [DISTWIDTH] IN BLOOD BY AUTOMATED COUNT: 15.3 % (ref 11.5–14.5)
EST. GFR  (AFRICAN AMERICAN): >60 ML/MIN/1.73 M^2
EST. GFR  (NON AFRICAN AMERICAN): >60 ML/MIN/1.73 M^2
GLUCOSE SERPL-MCNC: 70 MG/DL (ref 70–110)
HCT VFR BLD AUTO: 33.1 % (ref 37–48.5)
HGB BLD-MCNC: 10.9 G/DL (ref 12–16)
IMM GRANULOCYTES # BLD AUTO: ABNORMAL K/UL
IMM GRANULOCYTES NFR BLD AUTO: ABNORMAL %
LYMPHOCYTES NFR BLD: 27 % (ref 18–48)
MCH RBC QN AUTO: 29.5 PG (ref 27–31)
MCHC RBC AUTO-ENTMCNC: 32.9 G/DL (ref 32–36)
MCV RBC AUTO: 90 FL (ref 82–98)
MONOCYTES NFR BLD: 6 % (ref 4–15)
NEUTROPHILS NFR BLD: 65 % (ref 38–73)
NEUTS BAND NFR BLD MANUAL: 1 %
NRBC BLD-RTO: 0 /100 WBC
PLATELET # BLD AUTO: 257 K/UL (ref 150–450)
PLATELET BLD QL SMEAR: ABNORMAL
PMV BLD AUTO: 10.9 FL (ref 9.2–12.9)
POTASSIUM SERPL-SCNC: 3.6 MMOL/L (ref 3.5–5.1)
PROT SERPL-MCNC: 5.1 G/DL (ref 6–8.4)
RBC # BLD AUTO: 3.69 M/UL (ref 4–5.4)
SODIUM SERPL-SCNC: 139 MMOL/L (ref 136–145)
WBC # BLD AUTO: 5.02 K/UL (ref 3.9–12.7)

## 2022-06-20 PROCEDURE — 25000003 PHARM REV CODE 250: Performed by: HOSPITALIST

## 2022-06-20 PROCEDURE — 97535 SELF CARE MNGMENT TRAINING: CPT

## 2022-06-20 PROCEDURE — 63600175 PHARM REV CODE 636 W HCPCS: Performed by: HOSPITALIST

## 2022-06-20 PROCEDURE — 97116 GAIT TRAINING THERAPY: CPT | Mod: CQ

## 2022-06-20 PROCEDURE — 99900035 HC TECH TIME PER 15 MIN (STAT)

## 2022-06-20 PROCEDURE — 94761 N-INVAS EAR/PLS OXIMETRY MLT: CPT

## 2022-06-20 PROCEDURE — 85025 COMPLETE CBC W/AUTO DIFF WBC: CPT | Performed by: HOSPITALIST

## 2022-06-20 PROCEDURE — 36415 COLL VENOUS BLD VENIPUNCTURE: CPT | Performed by: HOSPITALIST

## 2022-06-20 PROCEDURE — 97165 OT EVAL LOW COMPLEX 30 MIN: CPT

## 2022-06-20 PROCEDURE — 80053 COMPREHEN METABOLIC PANEL: CPT | Performed by: HOSPITALIST

## 2022-06-20 RX ORDER — DOCUSATE SODIUM 100 MG/1
100 CAPSULE, LIQUID FILLED ORAL 2 TIMES DAILY
Qty: 60 CAPSULE | Refills: 1 | Status: SHIPPED | OUTPATIENT
Start: 2022-06-20

## 2022-06-20 RX ORDER — AMIODARONE HYDROCHLORIDE 200 MG/1
200 TABLET ORAL DAILY
Qty: 30 TABLET | Refills: 1 | Status: ON HOLD | OUTPATIENT
Start: 2022-06-21 | End: 2024-01-27 | Stop reason: HOSPADM

## 2022-06-20 RX ORDER — POLYETHYLENE GLYCOL 3350 17 G/17G
17 POWDER, FOR SOLUTION ORAL 2 TIMES DAILY
Qty: 60 PACKET | Refills: 1 | Status: SHIPPED | OUTPATIENT
Start: 2022-06-20 | End: 2023-12-06

## 2022-06-20 RX ORDER — METOPROLOL TARTRATE 50 MG/1
50 TABLET ORAL 3 TIMES DAILY
Qty: 90 TABLET | Refills: 1 | Status: ON HOLD | OUTPATIENT
Start: 2022-06-20 | End: 2024-01-27

## 2022-06-20 RX ADMIN — OXYCODONE AND ACETAMINOPHEN 1 TABLET: 10; 325 TABLET ORAL at 05:06

## 2022-06-20 RX ADMIN — HYDRALAZINE HYDROCHLORIDE 10 MG: 20 INJECTION INTRAMUSCULAR; INTRAVENOUS at 08:06

## 2022-06-20 RX ADMIN — AMIODARONE HYDROCHLORIDE 200 MG: 200 TABLET ORAL at 08:06

## 2022-06-20 RX ADMIN — SODIUM CHLORIDE: 0.9 INJECTION, SOLUTION INTRAVENOUS at 08:06

## 2022-06-20 RX ADMIN — APIXABAN 2.5 MG: 2.5 TABLET, FILM COATED ORAL at 08:06

## 2022-06-20 RX ADMIN — METOPROLOL TARTRATE 50 MG: 50 TABLET, FILM COATED ORAL at 08:06

## 2022-06-20 RX ADMIN — MUPIROCIN: 20 OINTMENT TOPICAL at 08:06

## 2022-06-20 NOTE — PROGRESS NOTES
[facility]  Cardiology  Progress Note    Patient Name: Froilan Ray  MRN: [id number]  Admission Date: 6/14/2022  Hospital Length of Stay: 4 days  Code Status: Full Code   Attending Physician: Tamara Vargas MD   Primary Care Physician: Primary Doctor No  Expected Discharge Date: 6/20/2022  Principal Problem:Encephalopathy, metabolic    Subjective:     Hospital Course:   6/19/2022:  Mrs. Ray reports no chest pain.  Main complaint is chronic back pain  Pt's blood pressure remains elevated, which is likely due to uncontrolled back pain  Telemetry shows patient has remained in sinus rhythm with no acute events      6/18/2022:  No acute events overnight  Pt reports no chest pain  Telemetry shows patient has remained in sinus rhythm with short run of VT at 5:00 a.m.      Objective:     Vital Signs (Most Recent):  Temp: 98.4 °F (36.9 °C) (06/19/22 2006)  Pulse: 77 (06/19/22 2006)  Resp: 18 (06/19/22 2006)  BP: (!) 184/84 (06/19/22 2006)  SpO2: 99 % (06/19/22 2007)   Vital Signs (24h Range):  Temp:  [96.5 °F (35.8 °C)-98.4 °F (36.9 °C)] 98.4 °F (36.9 °C)  Pulse:  [67-88] 77  Resp:  [16-18] 18  SpO2:  [93 %-99 %] 99 %  BP: (175-190)/(79-95) 184/84     Weight: 79.8 kg (176 lb)  Body mass index is 28.41 kg/m².     SpO2: 99 %  O2 Device (Oxygen Therapy): room air      Intake/Output Summary (Last 24 hours) at 6/19/2022 2116  Last data filed at 6/19/2022 1802  Gross per 24 hour   Intake 2621.93 ml   Output --   Net 2621.93 ml       Lines/Drains/Airways       Peripheral Intravenous Line  Duration                  Peripheral IV - Single Lumen 06/15/22 0120 18 G Anterior;Right 4 days         Peripheral IV - Single Lumen 06/16/22 1752 20 G Distal;Left;Posterior Forearm 3 days                    Physical Exam  Vitals and nursing note reviewed.   Constitutional:       Appearance: She is obese.   HENT:      Head: Normocephalic and atraumatic.      Nose: Nose normal.   Eyes:      Extraocular Movements:  Extraocular movements intact.      Pupils: Pupils are equal, round, and reactive to light.   Neck:      Vascular: No carotid bruit.   Cardiovascular:      Rate and Rhythm: Normal rate and regular rhythm.   Pulmonary:      Effort: Pulmonary effort is normal.      Breath sounds: No wheezing or rales.   Abdominal:      Palpations: Abdomen is soft.   Musculoskeletal:      Cervical back: Neck supple.      Right lower leg: No edema.      Left lower leg: No edema.   Skin:     General: Skin is warm and dry.   Neurological:      General: No focal deficit present.      Mental Status: She is alert and oriented to person, place, and time.   Psychiatric:         Mood and Affect: Mood normal.         Behavior: Behavior normal.       Significant Labs: Blood Culture:   Recent Labs   Lab 06/18/22  1228   LABBLOO No Growth to date   , BMP:   Recent Labs   Lab 06/18/22  0516 06/19/22  0356 06/19/22  0616 06/19/22  1446   GLU 99 70  --  86    143  --  137   K 3.7 2.3*  --  3.6    120*  --  107   CO2 17* 17*  --  21*   BUN 9 3*  --  4*   CREATININE 0.6 0.4*  --  0.5   CALCIUM 8.5* 5.7*  --  7.6*   MG  --   --  1.6  --    , CMP   Recent Labs   Lab 06/18/22  0516 06/19/22  0356 06/19/22  1446    143 137   K 3.7 2.3* 3.6    120* 107   CO2 17* 17* 21*   GLU 99 70 86   BUN 9 3* 4*   CREATININE 0.6 0.4* 0.5   CALCIUM 8.5* 5.7* 7.6*   PROT 5.6* 3.5*  --    ALBUMIN 2.3* 1.6*  --    BILITOT 0.7 0.3  --    ALKPHOS 96 61  --    AST 20 11  --    ALT 16 9*  --    ANIONGAP 11 6* 9   ESTGFRAFRICA >60 >60 >60   EGFRNONAA >60 >60 >60   , CBC   Recent Labs   Lab 06/18/22  0516 06/19/22  0356   WBC 7.34 4.45   HGB 10.3* 8.5*   HCT 32.6* 25.8*    165   , INR No results for input(s): INR, PROTIME in the last 48 hours., Lipid Panel No results for input(s): CHOL, HDL, LDLCALC, TRIG, CHOLHDL in the last 48 hours., Troponin No results for input(s): TROPONINI in the last 48 hours., and All pertinent lab results from the last 24  "hours have been reviewed.    Significant Imaging: Echocardiogram: Transthoracic echo (TTE) complete (Cupid Only):   Results for orders placed or performed during the hospital encounter of 06/14/22   Echo   Result Value Ref Range    BSA 1.93 m2    TDI SEPTAL 0.07 m/s    LV LATERAL E/E' RATIO 12.00 m/s    LV SEPTAL E/E' RATIO 17.14 m/s    LA WIDTH 4.70 cm    AORTIC VALVE CUSP SEPERATION 2.16 cm    TDI LATERAL 0.10 m/s    PV PEAK VELOCITY 0.97 cm/s    LVIDd 5.47 3.5 - 6.0 cm    IVS 1.19 (A) 0.6 - 1.1 cm    Posterior Wall 1.16 (A) 0.6 - 1.1 cm    Ao root annulus 3.23 cm    LVIDs 4.06 (A) 2.1 - 4.0 cm    FS 26 28 - 44 %    LA volume 97.00 cm3    Sinus 2.79 cm    STJ 2.78 cm    Ascending aorta 3.51 cm    LV mass 262.30 g    LA size 4.04 cm    RVDD 3.13 cm    TAPSE 2.50 cm    RV S' 19.83 cm/s    Left Ventricle Relative Wall Thickness 0.42 cm    AV mean gradient 4 mmHg    AV valve area 3.17 cm2    AV Velocity Ratio 0.94     AV index (prosthetic) 0.93     MV mean gradient 1 mmHg    MV valve area p 1/2 method 5.08 cm2    MV valve area by continuity eq 2.34 cm2    E/A ratio 1.11     Mean e' 0.09 m/s    E wave deceleration time 149.32 msec    IVRT 125.59 msec    MV "A" wave duration 8.37 msec    Pulm vein S/D ratio 1.78     LVOT diameter 2.08 cm    LVOT area 3.4 cm2    LVOT peak arnaldo 1.28 m/s    LVOT peak VTI 25.10 cm    Ao peak arnaldo 1.36 m/s    Ao VTI 26.90 cm    Mr max arnaldo 0.05 m/s    LVOT stroke volume 85.25 cm3    AV peak gradient 7 mmHg    MV peak gradient 6 mmHg    E/E' ratio 14.12 m/s    MV Peak E Arnaldo 1.20 m/s    TR Max Arnaldo 2.47 m/s    MV VTI 36.44 cm    MV stenosis pressure 1/2 time 43.30 ms    MV Peak A Arnaldo 1.08 m/s    PV Peak S Arnaldo 0.71 m/s    PV Peak D Arnaldo 0.40 m/s    LV Systolic Volume 72.51 mL    LV Systolic Volume Index 38.4 mL/m2    LV Diastolic Volume 145.51 mL    LV Diastolic Volume Index 76.99 mL/m2    LA Volume Index 51.3 mL/m2    LV Mass Index 139 g/m2    RA Major Axis 4.62 cm    Left Atrium Minor Axis 5.99 cm "    Left Atrium Major Axis 6.03 cm    Triscuspid Valve Regurgitation Peak Gradient 24 mmHg    LA Volume Index (Mod) 51.4 mL/m2    LA volume (mod) 97.17 cm3    RA Width 2.84 cm    Right Atrial Pressure (from IVC) 3 mmHg    EF 50 %    TV rest pulmonary artery pressure 27 mmHg    Narrative    · The left ventricle is normal in size with concentric remodeling and   normal systolic function.  · Moderate left atrial enlargement.  · The estimated ejection fraction is 50%.  · Indeterminate left ventricular diastolic function with normal left   atrial pressure.  · Normal right ventricular size with normal right ventricular systolic   function.  · Mild mitral regurgitation.  · Normal central venous pressure (3 mmHg).  · The estimated PA systolic pressure is 27 mmHg.        Assessment and Plan:     Active Hospital Problems    Diagnosis  POA    *Encephalopathy, metabolic [G93.41]  Yes    Atrial fibrillation with RVR [I48.91]  Yes    Sepsis [A41.9]  Yes    PEDRO (acute kidney injury) [N17.9]  Yes    Iron deficiency anemia [D50.9]  Yes    HTN (hypertension) [I10]  Yes     Afib with RVR  Iron deficiency Anemia  Sepsis  PEDRO  HTN    Recommendations:  Patient remains in sinus rhythm.  I recommend improving pain control, which will improve pt's elevated blood pressures.  Continue metoprolol 50 mg p.o. t.i.d., amiodarone 200 mg daily for rate/rhythm control  Continue Eliquis for anticoagulation.  Continue to monitor on telemetry.      Kurtis Kent MD PhD  Cardiology  [facility]

## 2022-06-20 NOTE — PLAN OF CARE
Problem: Physical Therapy  Goal: Physical Therapy Goal  Description: Goals to be met by: 22    Patient will increase functional independence with mobility by performin. Supine to sit with [place]  2. Sit to supine with [place]  3. Sit to stand transfer with Supervision  4. Bed to chair transfer with Supervision using Rolling Walker  5. Gait  x 150 feet with Supervision using Rolling Walker.     Outcome: Ongoing, Progressing   Ambulate with rw and assistance for safety.

## 2022-06-20 NOTE — PLAN OF CARE
"The patient's EJ dressing to the right was changed via sterile technique. The patient tolerated it well. Her blood pressure was elevated at 20:06 pm  to 184/84. She took her scheduled blood pressure medications and on the re-check the blood pressure was 177/82 @ 22:02 pm. The patient complains of pain of a "10" to her low back. She has had multiple back surgeries. I spoke with the NP and she ordered a Lidocaine Patch for her back. The patch was applied. She still taking her Percocet Q6 hours.   "

## 2022-06-20 NOTE — PT/OT/SLP EVAL
Occupational Therapy   Evaluation and Discharge Note    Name: Froilan Ray  MRN: [id number]  Admitting Diagnosis:  Encephalopathy, metabolic   Recent Surgery: * No surgery found *      Recommendations:     Discharge Recommendations: home, home health OT  Discharge Equipment Recommendations:  none  Barriers to discharge:  None    Assessment:     Froilan Ray is a 76 y.o. female with a medical diagnosis of Encephalopathy, metabolic. At this time, patient is SBA level with grooming, dressing, and toileting tasks. Patient performs transfers requiring SBA using RW. Patient has already discharged from hospital.     Plan:     During this hospitalization, patient does not require further acute OT services.  Please re-consult if situation changes.    · Plan of Care Reviewed with: patient, spouse    Subjective     Chief Complaint: none  Patient/Family Comments/goals: none    Occupational Profile:  Living Environment: Patient lives with spouse in a trailer with 2 ALBERT.   Previous level of function: Patient was Mod I with ADLs and ambulatory using AD.   Equipment Used at home:  wheelchair, walker, rolling, cane, straight  Assistance upon Discharge: Patient will receive assistance from spouse.     Pain/Comfort:  · Pain Rating 1: 0/10  · Pain Rating Post-Intervention 1: 0/10    Patients cultural, spiritual, Yarsani conflicts given the current situation:      Objective:     Communicated with: nurse Freitas prior to session.  Patient found up in chair with telemetry, peripheral IV upon OT entry to room.    General Precautions: Standard, fall   Orthopedic Precautions:    Braces: N/A  Respiratory Status: Room air     Occupational Performance:    Bed Mobility:    · Not assessed; patient seated in chair upon arrival. See PT notes.      Functional Mobility/Transfers:  · Patient completed Sit <> Stand Transfer with contact guard assistance  with  rolling walker   · Patient completed Toilet Transfer Stand Pivot technique with  contact guard assistance with  rolling walker    Activities of Daily Living:  · Grooming: stand by assistance with hand hygiene while standing at sink  · Upper Body Dressing: stand by assistance   · Lower Body Dressing: supervision with don/doffing socks while seated in chair  · Toileting: supervision with es-hygiene after voiding on toilet    Cognitive/Visual Perceptual:  Cognitive/Psychosocial Skills:     -       Oriented to: x4   -       Follows Commands/attention:Follows multistep  commands  -       Communication: clear/fluent  -       Safety awareness/insight to disability: intact   -       Mood/Affect/Coping skills/emotional control: Appropriate to situation and Cooperative  Visual/Perceptual:      -Intact     Physical Exam:  Postural examination/scapula alignment:    -       Rounded shoulders  -       Forward head  Upper Extremity Range of Motion:     -       Right Upper Extremity: WFL  -       Left Upper Extremity: WFL  Upper Extremity Strength:    -       Right Upper Extremity: WFL  -       Left Upper Extremity: WFL   Strength:    -       Right Upper Extremity: WFL  -       Left Upper Extremity: WFL  Fine Motor Coordination:    -       Intact  Gross motor coordination:   WFL in BUE    AMPAC 6 Click ADL:  AMPAC Total Score: 20    Treatment & Education:  OT ed pt on OT role & POC as well as discharge recommendations.  OT ed patient on safety with walker use for functional mobility with cues for hand placement & sequencing.     Education:    Patient left up in chair with all lines intact, call button in reach, chair alarm on and spouse present    GOALS:   Multidisciplinary Problems     Occupational Therapy Goals     Not on file                History:     Past Medical History:   Diagnosis Date    Anemia     Arthritis     Bleeding ulcer 07/2016    Chronic pain     DDD (degenerative disc disease), cervical     DDD (degenerative disc disease), lumbar     Depression     Disc degeneration, lumbosacral      Diverticulitis     Encounter for blood transfusion     Hypertension     IBS (irritable bowel syndrome)     Neuropathy of both feet     Seizures     none  since 2017    Umbilical hernia 2020       Past Surgical History:   Procedure Laterality Date    BACK SURGERY      BREAST SURGERY Right     lumpectomy    CAUDAL EPIDURAL STEROID INJECTION N/A 1/28/2022    Procedure: Injection-steroid-epidural-caudal;  Surgeon: Luzmaria Marcelino MD;  [facility] OR;  Service: Pain Management;  Laterality: N/A;    COLONOSCOPY N/A 1/14/2022    Procedure: COLONOSCOPY;  Surgeon: Abby Landers MD;  Location: [facility] ENDO;  Service: Endoscopy;  Laterality: N/A;    ENDOSCOPIC ULTRASOUND OF UPPER GASTROINTESTINAL TRACT N/A 7/21/2020    Procedure: ULTRASOUND, UPPER GI TRACT, ENDOSCOPIC;  Surgeon: Marcio Nguyễn III, MD;  Location: [facility] ENDO;  Service: Endoscopy;  Laterality: N/A;    ESOPHAGOGASTRODUODENOSCOPY N/A 1/14/2022    Procedure: EGD (ESOPHAGOGASTRODUODENOSCOPY);  Surgeon: Abby Landers MD;  Location: [facility];  Service: Endoscopy;  Laterality: N/A;    ESOPHAGOGASTRODUODENOSCOPY N/A 6/10/2022    Procedure: EGD (ESOPHAGOGASTRODUODENOSCOPY);  Surgeon: Abby Landers MD;  Location: [facility] ENDO;  Service: Endoscopy;  Laterality: N/A;    EYE SURGERY      cataract    HYSTERECTOMY      INTRAMEDULLARY RODDING OF TROCHANTER OF FEMUR Left 12/11/2018    Procedure: INSERTION, INTRAMEDULLARY SANTOS, FEMUR, TROCHANTER;  Surgeon: Eulalio De La Cruz MD;  Location: [facility];  Service: Orthopedics;  Laterality: Left;    SPINAL CORD STIMULATOR IMPLANT  09/18/2013    and removal    SPINE SURGERY  2006    lumbar L2-S1 decompression.    SPINE SURGERY      cervical decompression    TONSILLECTOMY         Time Tracking:     OT Date of Treatment: 06/20/22  OT Start Time: 1023  OT Stop Time: 1041  OT Total Time (min): 18 min    Billable Minutes:Evaluation 8  Self Care/Home Management 10    6/20/2022

## 2022-06-20 NOTE — PLAN OF CARE
Pt cleared for Discharge by Case Management.    PCP Appt scheduled and added to AVS.    Cardiology and GI offices called and will call pt with Appt.    CM called [facility] to price check Eliquis... Cost will be $4.00.    No other CM needs noted.       06/20/22 1231   Final Note   Assessment Type Final Discharge Note   Hospital Resources/Appts/Education Provided Appointments scheduled and added to AVS;Appointments scheduled by Navigator/Coordinator;Post-Acute resouces added to AVS   Post-Acute Status   Discharge Delays None known at this time

## 2022-06-20 NOTE — PT/OT/SLP PROGRESS
Physical Therapy Treatment    Patient Name:  Froilan Ray   MRN:  [id number]    Recommendations:     Discharge Recommendations:  home   Discharge Equipment Recommendations: none   Barriers to discharge: None    Assessment:     Froilan Ray is a 76 y.o. female admitted with a medical diagnosis of Encephalopathy, metabolic.  She presents with the following impairments/functional limitations:  weakness, impaired endurance, impaired self care skills, impaired functional mobilty, gait instability, impaired balance, decreased lower extremity function . Eager to progress mobility and discharge home with spouse. Reports no pain at this time . Requested mariya use commode prior top ambulating 60' with rw and CGA with forward posture.     Rehab Prognosis: Good; patient would benefit from acute skilled PT services to address these deficits and reach maximum level of function.    Recent Surgery: * No surgery found *      Plan:     During this hospitalization, patient to be seen 6 x/week to address the identified rehab impairments via gait training, therapeutic activities, therapeutic exercises and progress toward the following goals:    · Plan of Care Expires:  07/01/22    Subjective     Chief Complaint: none stated  Patient/Family Comments/goals: to return home soon  Pain/Comfort:  · Pain Rating 1: 0/10      Objective:     Communicated with nurse Freitas prior to session.  Patient found supine with bed alarm, telemetry upon PT entry to room.     General Precautions: Standard, fall   Orthopedic Precautions:N/A   Braces: N/A  Respiratory Status: Room air     Functional Mobility:  · Bed Mobility:     · Rolling Left:  contact guard assistance  · Supine to Sit: contact guard assistance  · Transfers:     · Sit to Stand:  contact guard assistance with rolling walker  · Gait: 60' with rw and CGA with IV in tow.       AM-PAC 6 CLICK MOBILITY          Therapeutic Activities and Exercises:   Transferred EOB> BSC with CGA. Performed  hygiene post void with S.   Ambulated 60' with rw and CGA with forward flexed posture.   Returned to room to sit in chair at bedside.       Patient left up in chair with all lines intact, call button in reach, chair alarm on, nurse Zena notified and spouse present..    GOALS:   Multidisciplinary Problems     Physical Therapy Goals        Problem: Physical Therapy    Goal Priority Disciplines Outcome Goal Variances Interventions   Physical Therapy Goal     PT, PT/OT Ongoing, Progressing     Description: Goals to be met by: 22    Patient will increase functional independence with mobility by performin. Supine to sit with [place]  2. Sit to supine with [place]  3. Sit to stand transfer with Supervision  4. Bed to chair transfer with Supervision using Rolling Walker  5. Gait  x 150 feet with Supervision using Rolling Walker.                      Time Tracking:     PT Received On: 22  PT Start Time: 1000     PT Stop Time: 1010  PT Total Time (min): 10 min     Billable Minutes: Gait Training 10min    Treatment Type: Treatment  PT/PTA: PTA     PTA Visit Number: 2     2022

## 2022-06-20 NOTE — TELEPHONE ENCOUNTER
----- Message from Rick Ag sent at 6/20/2022 12:20 PM CDT -----  Type:  Sooner Appointment Request    Caller is requesting a sooner appointment.  Caller declined first available appointment listed below.  Caller will not accept being placed on the waitlist and is requesting a message be sent to doctor.    Name of Caller:  LeAnn w/ [facility] Case Management  When is the first available appointment?     Symptoms:  Discharge 6/20 2 week Hosp F/u  Best Call Back Number:  [phone]    Additional Information:  Please advise -- Thank you

## 2022-06-20 NOTE — NURSING
Pt is A&O (x4). IV and tele intact. Pt reports no pain at this moment. Report given to HEATHER Ponce. Discussed the following: total of 120 MEq of potassium tablet given before 1400. K levels checked around 1533 K increased to 3.6. An additional 50 Meq tablet given. Hydralazine ordered PRN for elevated BP.   All questions answered. Nurse verbalized understanding.

## 2022-06-20 NOTE — SUBJECTIVE & OBJECTIVE
Objective:     Vital Signs (Most Recent):  Temp: 98.4 °F (36.9 °C) (06/19/22 2006)  Pulse: 77 (06/19/22 2006)  Resp: 18 (06/19/22 2006)  BP: (!) 184/84 (06/19/22 2006)  SpO2: 99 % (06/19/22 2007)   Vital Signs (24h Range):  Temp:  [96.5 °F (35.8 °C)-98.4 °F (36.9 °C)] 98.4 °F (36.9 °C)  Pulse:  [67-88] 77  Resp:  [16-18] 18  SpO2:  [93 %-99 %] 99 %  BP: (175-190)/(79-95) 184/84     Weight: 79.8 kg (176 lb)  Body mass index is 28.41 kg/m².     SpO2: 99 %  O2 Device (Oxygen Therapy): room air      Intake/Output Summary (Last 24 hours) at 6/19/2022 2116  Last data filed at 6/19/2022 1802  Gross per 24 hour   Intake 2621.93 ml   Output --   Net 2621.93 ml       Lines/Drains/Airways       Peripheral Intravenous Line  Duration                  Peripheral IV - Single Lumen 06/15/22 0120 18 G Anterior;Right 4 days         Peripheral IV - Single Lumen 06/16/22 1752 20 G Distal;Left;Posterior Forearm 3 days                    Physical Exam  Vitals and nursing note reviewed.   Constitutional:       Appearance: She is obese.   HENT:      Head: Normocephalic and atraumatic.      Nose: Nose normal.   Eyes:      Extraocular Movements: Extraocular movements intact.      Pupils: Pupils are equal, round, and reactive to light.   Neck:      Vascular: No carotid bruit.   Cardiovascular:      Rate and Rhythm: Normal rate and regular rhythm.   Pulmonary:      Effort: Pulmonary effort is normal.      Breath sounds: No wheezing or rales.   Abdominal:      Palpations: Abdomen is soft.   Musculoskeletal:      Cervical back: Neck supple.      Right lower leg: No edema.      Left lower leg: No edema.   Skin:     General: Skin is warm and dry.   Neurological:      General: No focal deficit present.      Mental Status: She is alert and oriented to person, place, and time.   Psychiatric:         Mood and Affect: Mood normal.         Behavior: Behavior normal.       Significant Labs: Blood Culture:   Recent Labs   Lab 06/18/22  1228   LABBLOO No  Growth to date   , BMP:   Recent Labs   Lab 06/18/22  0516 06/19/22  0356 06/19/22  0616 06/19/22  1446   GLU 99 70  --  86    143  --  137   K 3.7 2.3*  --  3.6    120*  --  107   CO2 17* 17*  --  21*   BUN 9 3*  --  4*   CREATININE 0.6 0.4*  --  0.5   CALCIUM 8.5* 5.7*  --  7.6*   MG  --   --  1.6  --    , CMP   Recent Labs   Lab 06/18/22  0516 06/19/22  0356 06/19/22  1446    143 137   K 3.7 2.3* 3.6    120* 107   CO2 17* 17* 21*   GLU 99 70 86   BUN 9 3* 4*   CREATININE 0.6 0.4* 0.5   CALCIUM 8.5* 5.7* 7.6*   PROT 5.6* 3.5*  --    ALBUMIN 2.3* 1.6*  --    BILITOT 0.7 0.3  --    ALKPHOS 96 61  --    AST 20 11  --    ALT 16 9*  --    ANIONGAP 11 6* 9   ESTGFRAFRICA >60 >60 >60   EGFRNONAA >60 >60 >60   , CBC   Recent Labs   Lab 06/18/22  0516 06/19/22  0356   WBC 7.34 4.45   HGB 10.3* 8.5*   HCT 32.6* 25.8*    165   , INR No results for input(s): INR, PROTIME in the last 48 hours., Lipid Panel No results for input(s): CHOL, HDL, LDLCALC, TRIG, CHOLHDL in the last 48 hours., Troponin No results for input(s): TROPONINI in the last 48 hours., and All pertinent lab results from the last 24 hours have been reviewed.    Significant Imaging: Echocardiogram: Transthoracic echo (TTE) complete (Cupid Only):   Results for orders placed or performed during the hospital encounter of 06/14/22   Echo   Result Value Ref Range    BSA 1.93 m2    TDI SEPTAL 0.07 m/s    LV LATERAL E/E' RATIO 12.00 m/s    LV SEPTAL E/E' RATIO 17.14 m/s    LA WIDTH 4.70 cm    AORTIC VALVE CUSP SEPERATION 2.16 cm    TDI LATERAL 0.10 m/s    PV PEAK VELOCITY 0.97 cm/s    LVIDd 5.47 3.5 - 6.0 cm    IVS 1.19 (A) 0.6 - 1.1 cm    Posterior Wall 1.16 (A) 0.6 - 1.1 cm    Ao root annulus 3.23 cm    LVIDs 4.06 (A) 2.1 - 4.0 cm    FS 26 28 - 44 %    LA volume 97.00 cm3    Sinus 2.79 cm    STJ 2.78 cm    Ascending aorta 3.51 cm    LV mass 262.30 g    LA size 4.04 cm    RVDD 3.13 cm    TAPSE 2.50 cm    RV S' 19.83 cm/s    Left  "Ventricle Relative Wall Thickness 0.42 cm    AV mean gradient 4 mmHg    AV valve area 3.17 cm2    AV Velocity Ratio 0.94     AV index (prosthetic) 0.93     MV mean gradient 1 mmHg    MV valve area p 1/2 method 5.08 cm2    MV valve area by continuity eq 2.34 cm2    E/A ratio 1.11     Mean e' 0.09 m/s    E wave deceleration time 149.32 msec    IVRT 125.59 msec    MV "A" wave duration 8.37 msec    Pulm vein S/D ratio 1.78     LVOT diameter 2.08 cm    LVOT area 3.4 cm2    LVOT peak arnaldo 1.28 m/s    LVOT peak VTI 25.10 cm    Ao peak arnaldo 1.36 m/s    Ao VTI 26.90 cm    Mr max arnaldo 0.05 m/s    LVOT stroke volume 85.25 cm3    AV peak gradient 7 mmHg    MV peak gradient 6 mmHg    E/E' ratio 14.12 m/s    MV Peak E Arnaldo 1.20 m/s    TR Max Arnaldo 2.47 m/s    MV VTI 36.44 cm    MV stenosis pressure 1/2 time 43.30 ms    MV Peak A Arnaldo 1.08 m/s    PV Peak S Arnaldo 0.71 m/s    PV Peak D Arnaldo 0.40 m/s    LV Systolic Volume 72.51 mL    LV Systolic Volume Index 38.4 mL/m2    LV Diastolic Volume 145.51 mL    LV Diastolic Volume Index 76.99 mL/m2    LA Volume Index 51.3 mL/m2    LV Mass Index 139 g/m2    RA Major Axis 4.62 cm    Left Atrium Minor Axis 5.99 cm    Left Atrium Major Axis 6.03 cm    Triscuspid Valve Regurgitation Peak Gradient 24 mmHg    LA Volume Index (Mod) 51.4 mL/m2    LA volume (mod) 97.17 cm3    RA Width 2.84 cm    Right Atrial Pressure (from IVC) 3 mmHg    EF 50 %    TV rest pulmonary artery pressure 27 mmHg    Narrative    · The left ventricle is normal in size with concentric remodeling and   normal systolic function.  · Moderate left atrial enlargement.  · The estimated ejection fraction is 50%.  · Indeterminate left ventricular diastolic function with normal left   atrial pressure.  · Normal right ventricular size with normal right ventricular systolic   function.  · Mild mitral regurgitation.  · Normal central venous pressure (3 mmHg).  · The estimated PA systolic pressure is 27 mmHg.        "

## 2022-06-20 NOTE — DISCHARGE INSTRUCTIONS
Discharge Instructions, [facility] Medicine    Thank you for choosing [facility] for your medical care. The primary doctor who is taking care of you at the time of your discharge is Tamara Vargas MD.     You were admitted to the hospital with Encephalopathy, metabolic.     Please note your discharge instructions, including diet/activity restrictions, follow-up appointments, and medication changes.  If you have any questions about your medical issues, prescriptions, or any other questions, please feel free to contact the [facility] Medicine Dept at [phone] and we will help.    If you are previously with Home health, outpatient PT/OT or under a therapy program, you are cleared to return to those programs.    Please direct all long term medication refills and follow up to your primary care provider, Primary Doctor No. Thank you again for letting us take care of your health care needs.    Please note the following discharge instructions per your discharging physician-  Take all medications as prescribed  Be careful not to fall when on blood thinner  Follow up with PCP, GI, Cardiology

## 2022-06-20 NOTE — ANESTHESIA POSTPROCEDURE EVALUATION
Anesthesia Post Evaluation    Patient: Froilan Ray    Procedure(s) Performed: Procedure(s) (LRB):  EGD (ESOPHAGOGASTRODUODENOSCOPY) (N/A)    Final Anesthesia Type: general      Patient location during evaluation: PACU  Patient participation: Yes- Able to Participate  Level of consciousness: awake and alert and oriented  Post-procedure vital signs: reviewed and stable  Pain management: adequate  Airway patency: patent    PONV status at discharge: No PONV  Anesthetic complications: no      Cardiovascular status: blood pressure returned to baseline  Respiratory status: unassisted, spontaneous ventilation and room air  Hydration status: euvolemic  Follow-up not needed.          Vitals Value Taken Time   /72 06/20/22 1135   Temp 36.1 °C (97 °F) 06/20/22 1135   Pulse 73 06/20/22 1135   Resp 17 06/20/22 1135   SpO2 98 % 06/20/22 1254         Event Time   Out of Recovery 14:46:42         Pain/Duarte Score: Pain Rating Prior to Med Admin: 8 (6/20/2022  5:54 AM)  Pain Rating Post Med Admin: 6 (6/20/2022  6:29 AM)

## 2022-06-20 NOTE — NURSING
Pt has D/C orders. Telemetry and IV(s) removed. D/C instructions provided. Answered all questions, pt verbalized understanding. Pt left the floor with all of her belongings and family member.

## 2022-06-21 ENCOUNTER — OFFICE VISIT (OUTPATIENT)
Dept: PAIN MEDICINE | Facility: CLINIC | Age: 77
End: 2022-06-21
Payer: MEDICARE

## 2022-06-21 VITALS
HEART RATE: 113 BPM | BODY MASS INDEX: 26.84 KG/M2 | HEIGHT: 66 IN | WEIGHT: 167 LBS | DIASTOLIC BLOOD PRESSURE: 70 MMHG | SYSTOLIC BLOOD PRESSURE: 115 MMHG

## 2022-06-21 DIAGNOSIS — M96.1 FAILED BACK SYNDROME OF LUMBAR SPINE: Primary | ICD-10-CM

## 2022-06-21 DIAGNOSIS — M51.36 DDD (DEGENERATIVE DISC DISEASE), LUMBAR: ICD-10-CM

## 2022-06-21 DIAGNOSIS — F11.90 CHRONIC, CONTINUOUS USE OF OPIOIDS: ICD-10-CM

## 2022-06-21 DIAGNOSIS — M54.16 LUMBAR RADICULOPATHY: ICD-10-CM

## 2022-06-21 DIAGNOSIS — M47.896 OTHER SPONDYLOSIS, LUMBAR REGION: ICD-10-CM

## 2022-06-21 PROCEDURE — 1100F PTFALLS ASSESS-DOCD GE2>/YR: CPT | Mod: CPTII,S$GLB,, | Performed by: ANESTHESIOLOGY

## 2022-06-21 PROCEDURE — 99999 PR PBB SHADOW E&M-EST. PATIENT-LVL III: ICD-10-PCS | Mod: PBBFAC,,, | Performed by: ANESTHESIOLOGY

## 2022-06-21 PROCEDURE — 1159F MED LIST DOCD IN RCRD: CPT | Mod: CPTII,S$GLB,, | Performed by: ANESTHESIOLOGY

## 2022-06-21 PROCEDURE — 3288F FALL RISK ASSESSMENT DOCD: CPT | Mod: CPTII,S$GLB,, | Performed by: ANESTHESIOLOGY

## 2022-06-21 PROCEDURE — 3008F BODY MASS INDEX DOCD: CPT | Mod: CPTII,S$GLB,, | Performed by: ANESTHESIOLOGY

## 2022-06-21 PROCEDURE — 3074F SYST BP LT 130 MM HG: CPT | Mod: CPTII,S$GLB,, | Performed by: ANESTHESIOLOGY

## 2022-06-21 PROCEDURE — 1159F PR MEDICATION LIST DOCUMENTED IN MEDICAL RECORD: ICD-10-PCS | Mod: CPTII,S$GLB,, | Performed by: ANESTHESIOLOGY

## 2022-06-21 PROCEDURE — 1111F PR DISCHARGE MEDS RECONCILED W/ CURRENT OUTPATIENT MED LIST: ICD-10-PCS | Mod: CPTII,S$GLB,, | Performed by: ANESTHESIOLOGY

## 2022-06-21 PROCEDURE — 3078F DIAST BP <80 MM HG: CPT | Mod: CPTII,S$GLB,, | Performed by: ANESTHESIOLOGY

## 2022-06-21 PROCEDURE — 1125F PR PAIN SEVERITY QUANTIFIED, PAIN PRESENT: ICD-10-PCS | Mod: CPTII,S$GLB,, | Performed by: ANESTHESIOLOGY

## 2022-06-21 PROCEDURE — 3288F PR FALLS RISK ASSESSMENT DOCUMENTED: ICD-10-PCS | Mod: CPTII,S$GLB,, | Performed by: ANESTHESIOLOGY

## 2022-06-21 PROCEDURE — 3008F PR BODY MASS INDEX (BMI) DOCUMENTED: ICD-10-PCS | Mod: CPTII,S$GLB,, | Performed by: ANESTHESIOLOGY

## 2022-06-21 PROCEDURE — 3078F PR MOST RECENT DIASTOLIC BLOOD PRESSURE < 80 MM HG: ICD-10-PCS | Mod: CPTII,S$GLB,, | Performed by: ANESTHESIOLOGY

## 2022-06-21 PROCEDURE — 80326 AMPHETAMINES 5 OR MORE: CPT | Performed by: ANESTHESIOLOGY

## 2022-06-21 PROCEDURE — 99213 PR OFFICE/OUTPT VISIT, EST, LEVL III, 20-29 MIN: ICD-10-PCS | Mod: S$GLB,,, | Performed by: ANESTHESIOLOGY

## 2022-06-21 PROCEDURE — 4010F ACE/ARB THERAPY RXD/TAKEN: CPT | Mod: CPTII,S$GLB,, | Performed by: ANESTHESIOLOGY

## 2022-06-21 PROCEDURE — 1125F AMNT PAIN NOTED PAIN PRSNT: CPT | Mod: CPTII,S$GLB,, | Performed by: ANESTHESIOLOGY

## 2022-06-21 PROCEDURE — 4010F PR ACE/ARB THEARPY RXD/TAKEN: ICD-10-PCS | Mod: CPTII,S$GLB,, | Performed by: ANESTHESIOLOGY

## 2022-06-21 PROCEDURE — 3074F PR MOST RECENT SYSTOLIC BLOOD PRESSURE < 130 MM HG: ICD-10-PCS | Mod: CPTII,S$GLB,, | Performed by: ANESTHESIOLOGY

## 2022-06-21 PROCEDURE — 1111F DSCHRG MED/CURRENT MED MERGE: CPT | Mod: CPTII,S$GLB,, | Performed by: ANESTHESIOLOGY

## 2022-06-21 PROCEDURE — 99213 OFFICE O/P EST LOW 20 MIN: CPT | Mod: S$GLB,,, | Performed by: ANESTHESIOLOGY

## 2022-06-21 PROCEDURE — 1100F PR PT FALLS ASSESS DOC 2+ FALLS/FALL W/INJURY/YR: ICD-10-PCS | Mod: CPTII,S$GLB,, | Performed by: ANESTHESIOLOGY

## 2022-06-21 PROCEDURE — 99999 PR PBB SHADOW E&M-EST. PATIENT-LVL III: CPT | Mod: PBBFAC,,, | Performed by: ANESTHESIOLOGY

## 2022-06-21 RX ORDER — OXYCODONE AND ACETAMINOPHEN 10; 325 MG/1; MG/1
TABLET ORAL
Qty: 140 TABLET | Refills: 0 | Status: SHIPPED | OUTPATIENT
Start: 2022-08-02 | End: 2022-09-28

## 2022-06-21 RX ORDER — OXYCODONE AND ACETAMINOPHEN 10; 325 MG/1; MG/1
TABLET ORAL
Qty: 140 TABLET | Refills: 0 | Status: SHIPPED | OUTPATIENT
Start: 2022-08-30 | End: 2022-09-28 | Stop reason: SDUPTHER

## 2022-06-21 RX ORDER — OXYCODONE AND ACETAMINOPHEN 10; 325 MG/1; MG/1
TABLET ORAL
Qty: 140 TABLET | Refills: 0 | Status: SHIPPED | OUTPATIENT
Start: 2022-07-05 | End: 2022-09-28

## 2022-06-21 NOTE — PROGRESS NOTES
"FOLLOW UP NOTE:     CHIEF COMPLAINT: back pain    INITIAL HISTORY OF PRESENT ILLNESS: Froilan Ray is a 75 y.o. female with PMH significant for hx of SCS implantation (Dr. Noel; 2013) with subsequent explantation, hx of ORIF of left intertrochanteric fracture (Dr. De La Cruz; 2018), hx of seizure disorder (well controlled), and HTN presents for the evaluation of low back pain. 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 The patient reports that she saw Dr. Parr in the past. The patient reports that she has been treated by Dr. Bhatti for the past 5 years. The patient presents to re-establish care with a new pain management provider today. In regards to her pain, the patient localizes her pain to the area across her lower back. The patient reports of radiation down her BLE's to her feet. The patient describes her pain as a burning type of pain. The patient reports of numbness in her legs. The patient reports that her pain is a 10/10 at its worst. Patient denies of any urinary/fecal incontinence, saddle anesthesia. The patient reports of subjective weakness in her legs.      Aggravating factors: bending, lifting items     Mitigating factors: lyrica (benefit in regards to burning leg pain), pain medication     Relevant Surgeries: yes; cervical spine X 1 and lumbar spine surgery X 1     Interventional Therapies: yes; prior SCS implantation and removal. Last saw Dr. Bhatti over the last 5 years. Patient reports that she was "dismissed" from Dr. Bhatti's practice. She reports that she was dismissed secondary to being unable to present for a scheduled UDS. One injection in her back and knee injections - reports of some benefit in regards to her knee pain. Denies of prior RFA.    INTERVAL HISTORY OF PRESENT ILLNESS: Froilan Ray is a 76 y.o. female with PMH significant for hx of SCS implantation (Dr." Bert; 2013) with subsequent explantation, hx of ORIF of left intertrochanteric fracture (Dr. De La Cruz; 2018), hx of seizure disorder (well controlled), and HTN presents as an established patient for the continued management of back pain. In the interim, the patient reports that she required ICU admission for a fib and infection. The patient reports that she is now on eliquis. In regards to her chronic pain, the patient continues to localize her pain to the area across her lower back with radiation down her BLE's to her feet. She reports that her pain is worsened with any physical activity in general. She reports that her pain is improved with her pain regimen as outlined below. The patient denies of any significant changes in her health since her last appointment. The patient also denies of any changes in the character of her pain since her last appointment. The patient reports that her current pain is a 8/10. Patient denies of any urinary/fecal incontinence, saddle anesthesia, or new weakness.      The patient presents today for a medication refill.     INTERVENTIONAL PAIN HISTORY:  1/28/2022: Caudal epidural steroid injection - no relief  11/19/2021: Bilateral knee injections      CURRENT PAIN MEDICATIONS:   Lyrica 200 mg PO TID  OTC aleve  flexeril 10 mg PO TID  Percocet  mg PO QID        ROS:  Review of Systems   Constitutional: Negative for chills and fever.   HENT: Negative for sore throat.    Eyes: Negative for visual disturbance.   Respiratory: Negative for shortness of breath.    Cardiovascular: Negative for chest pain.   Gastrointestinal: Negative for nausea and vomiting.   Genitourinary: Negative for difficulty urinating.   Musculoskeletal: Positive for back pain.   Skin: Negative for rash.   Allergic/Immunologic: Negative for immunocompromised state.   Neurological: Positive for numbness. Negative for syncope.   Hematological: Does not bruise/bleed easily.   Psychiatric/Behavioral: Negative for  "suicidal ideas.        MEDICAL, SURGICAL, FAMILY, SOCIAL HX: reviewed    MEDICATIONS/ALLERGIES: reviewed    PHYSICAL EXAM:    VITALS: Vitals reviewed.   Vitals:    06/21/22 1057   BP: 115/70   Pulse: (!) 113   Weight: 75.8 kg (167 lb)   Height: 5' 6" (1.676 m)   PainSc:   8   PainLoc: Back       Physical Exam  Vitals and nursing note reviewed.   Constitutional:       Appearance: She is not diaphoretic.   HENT:      Head: Normocephalic and atraumatic.   Eyes:      General:         Right eye: No discharge.         Left eye: No discharge.      Conjunctiva/sclera: Conjunctivae normal.   Cardiovascular:      Rate and Rhythm: Normal rate.   Pulmonary:      Effort: Pulmonary effort is normal. No respiratory distress.      Breath sounds: Normal breath sounds.   Abdominal:      Palpations: Abdomen is soft.   Skin:     General: Skin is warm and dry.      Findings: No rash.   Neurological:      Mental Status: She is alert and oriented to person, place, and time.   Psychiatric:         Mood and Affect: Mood and affect normal.         Cognition and Memory: Memory normal.         Judgment: Judgment normal.        UPPER EXTREMITIES: Normal alignment, normal range of motion, no atrophy, no skin changes,  hair growth and nail growth normal and equal bilaterally. No swelling, no tenderness.    LOWER EXTREMITIES:  Normal alignment, normal range of motion, no atrophy, no skin changes,  hair growth and nail growth normal and equal bilaterally. No swelling, no tenderness.     LUMBAR SPINE; patient with severe kyphosis of the lumbar spine  Lumbar spine: ROM is limited with flexion extension and oblique extension with increased pain with passive ROM.    ((+)) Facet loading   Myofascial exam: Tenderness to palpation across lumbar paraspinous muscles. Prior midline scar is well-healed.      MOTOR: Tone and bulk: normal bilateral upper and lower Strength: normal "   Delt      Bi         Tri        WE      WF                        R          5          5          5          5          5          5            L          5          5          5          5          5          5               IP         ADD     ABD     Quad   TA        Gas      HAM  R          5          5          5          5          5          5          5  L          5          5          5          5          5          5          5     SENSATION: Decreased sensation in the lower extremities to light touch  REFLEXES: normal, symmetric, nonbrisk.  Toes down, no clonus. Negative dooley's sign bilaterally.  GAIT: antalgic gait; uses a single point cane for assistance with ambulation    IMAGING: no new spine imaging to review    ASSESSMENT:  Froilan Ray is a 76 y.o. female with PMH significant for hx of SCS implantation (Dr. Noel; 2013) with subsequent explantation, hx of ORIF of left intertrochanteric fracture (Dr. De La Cruz; 2018), hx of seizure disorder (well controlled), and HTN presents as an established patient for the continued management of back pain. The patient continues to localize her pain to the area across her lower back with radiation down her BLE's to her feet. The patient reports that her pain is improved with her current pain regimen. The patient presents today for a medication refill. Treatment plan outlined below.     PLAN:  1. Refilled Percocet  mg PO q 6 hr PRN for breakthrough pain; # 140 tablets; 2 refills.  reviewed without discrepancies. Extra 20 tablets provided for to take PRN as she reports that she requires an additional tablet of Percocet as her pain is rather severe on certain days  2. Continue lyrica 200 mg PO TID for neuropathic pain  3. UDS to be collected today. The patient reports that she last had pain medications this AM.   4. I have stressed the importance of physical activity and a home exercise plan to help with chronic pain and improve  health.  5. Would offer lumbar MBBs in the future if her pain were to worsen  6. RTC in 3 months for follow-up    Luzmaria Marcelino MD  Pain Management

## 2022-06-21 NOTE — HOSPITAL COURSE
Patient was admitted to the hospital medicine service for abdominal pain and altered mental status.  Concern for sepsis.  She was started on broad-spectrum antibiotics and ID was consulted.  There was also initial concern for acute abdomen.  Surgery and GI were consulted and repeat CT scan was obtained.  It was determined the patient did not have a surgical abdomen but instead was profoundly constipated.  She was started on regular enemas on a bowel regimen.  Abdominal x-ray was monitored and stool burden decreased.  Mental status improved.  She was monitored off antibiotics per ID.  She went into AFib with RVR so went into AFib with RVR so Cardiology was consulted and she was placed on medications per Cardiology.  These medications were transitioned to p.o. when able.  Patient was started on diet and advanced as tolerated.  She was monitored when admission blood culture grew Gram-positive cocci in clusters resembling Staph and placed back on antibiotics for [place]..  Blood culture grew micrococcus deemed contaminant so antibiotics were discontinued.  She was discharged home with home health and GI, PCP, and Cardiology follow-up.

## 2022-06-21 NOTE — DISCHARGE SUMMARY
[facility] Medicine  Discharge Summary      Patient Name: Froilan Ray  MRN: [id number]  Patient Class: IP- Inpatient  Admission Date: 6/14/2022  Hospital Length of Stay: 5 days  Discharge Date and Time: 6/20/2022  1:29 PM  Attending Physician: No att. providers found   Discharging Provider: Tamara Vargas MD  Primary Care Provider: Alphonse Boothe III, MD      HPI:   Froilan Ray is a 76-year-old female who presents emergency room for evaluation of nausea, vomiting, abdominal pain, and altered mental status.  EMS reported to ER staff that upon their arrival patient's blood pressure was 74 systolic.  At the time of my exam patient was altered.  No family available at bedside.  All information was obtained from [facility] medical records as well as ER physician.  Previous medical history includes left hip fracture, drug induced constipation, hypertension, iron deficiency anemia, and seizure disorder.  ER workup:  CBC with mild anemia.  CMP with BUN of 31 creatinine 1.9 with bicarb of 18. Initial lactic acid was 1.4.  Second lactic acid was 3.6.  ABG with pH of 7.3, PO2 of 65, bicarb 19, pCO2 of 38. CT of the head was negative for any acute findings.  CT abdomen and pelvis demonstrates a colonic obstruction secondary to constipation of the rectosigmoid junction.  Patient was started on septic protocol.  Patient was given IV fluids.  Patient started on  vancomycin and Zosyn.  Patient admitted to Hospital Medicine for observation management.  Patient be placed in ICU.  Will consult Neurology as well as GI.      * No surgery found *      Hospital Course:   Patient was admitted to the hospital medicine service for abdominal pain and altered mental status.  Concern for sepsis.  She was started on broad-spectrum antibiotics and ID was consulted.  There was also initial concern for acute abdomen.  Surgery and GI were consulted and repeat CT scan was obtained.  It was determined the patient did  not have a surgical abdomen but instead was profoundly constipated.  She was started on regular enemas on a bowel regimen.  Abdominal x-ray was monitored and stool burden decreased.  Mental status improved.  She was monitored off antibiotics per ID.  She went into AFib with RVR so went into AFib with RVR so Cardiology was consulted and she was placed on medications per Cardiology.  These medications were transitioned to p.o. when able.  Patient was started on diet and advanced as tolerated.  She was monitored when admission blood culture grew Gram-positive cocci in clusters resembling Staph and placed back on antibiotics for Shore..  Blood culture grew micrococcus deemed contaminant so antibiotics were discontinued.  She was discharged home with home health and GI, PCP, and Cardiology follow-up.       Goals of Care Treatment Preferences:  Code Status: Full Code      Consults:   Consults (From admission, onward)        Status Ordering Provider     Inpatient consult to General Surgery  Once        Provider:  Matthew Romero MD    Completed ERIK HUFF     Inpatient consult to Infectious Diseases  Once        Provider:  Elayne Hernandez MD    Completed ERIK HUFF     Inpatient consult to Gastroenterology  Once        Provider:  Jose Boo MD    Completed AARON HERNANDEZ     Inpatient consult to Neurology  Once        Provider:  Moe Schuster MD    Completed AARON HERNANDEZ.     IP consult to dietary  Once        Provider:  (Not yet assigned)    Completed AARON HERNANDEZ          No new Assessment & Plan notes have been filed under this hospital service since the last note was generated.  Service: Hospital Medicine    Final Active Diagnoses:    Diagnosis Date Noted POA    PRINCIPAL PROBLEM:  Encephalopathy, metabolic [G93.41] 06/15/2022 Yes    Hypokalemia [E87.6] 06/19/2022 Yes    Atrial fibrillation with RVR [I48.91] 06/16/2022 Yes    Sepsis [A41.9] 06/15/2022 Yes    PEDRO  (acute kidney injury) [N17.9] 06/15/2022 Yes    Iron deficiency anemia [D50.9] 05/18/2022 Yes    HTN (hypertension) [I10] 12/11/2018 Yes      Problems Resolved During this Admission:       Discharged Condition: good    Disposition: Home or Self Care    Follow Up:   Follow-up Information     Primo Gamino MD Follow up in 2 week(s).    Specialties: Cardiology, Cardiovascular Disease  Why: CARDIOLOGY Hospital Follow Up- OFFICE WILL CALL YOU WITH YOUR APPT.  Contact information:  [address]  [address]  [facility] [address]  [phone]             Abby Landers MD Follow up in 2 week(s).    Specialties: Gastroenterology, Internal Medicine  Why: GASTROENTEROLOGY Hospital Follow Up- OFFICE WILL CALL YO WITH YOUR APPT.  Contact information:  [address]  [address]  [place] [address]  [phone]             Alphonse Boothe III, MD. Go on 6/30/2022.    Specialty: Family Medicine  Why: New PCP AND Hospital Follow Up- 6/30/22 at 3:00p  Contact information:  [address]  [address]  [facility] [address]  [phone]                       Patient Instructions:      Notify your health care provider if you experience any of the following:  temperature >100.4     Notify your health care provider if you experience any of the following:  persistent nausea and vomiting or diarrhea     Notify your health care provider if you experience any of the following:  severe uncontrolled pain     Notify your health care provider if you experience any of the following:  difficulty breathing or increased cough     Notify your health care provider if you experience any of the following:  severe persistent headache     Notify your health care provider if you experience any of the following:  persistent dizziness, light-headedness, or visual disturbances     Notify your health care provider if you experience any of the following:  increased confusion or weakness     Activity as tolerated       Significant Diagnostic Studies: Labs:    BMP:   Recent Labs   Lab 06/20/22  0430   GLU 70      K 3.6      CO2 20*   BUN 4*   CREATININE 0.6   CALCIUM 7.7*    and CBC   Recent Labs   Lab 06/20/22 0430   WBC 5.02   HGB 10.9*   HCT 33.1*      Radiology Results (last 7 days)    Procedure Component Value Units Date/Time   X-Ray Abdomen AP 1 View [[id number]] Resulted: 06/18/22 0622   Order Status: Completed Updated: 06/18/22 0625   Narrative:     EXAMINATION:   XR ABDOMEN AP 1 VIEW     CLINICAL HISTORY:   eval stool burden;     TECHNIQUE:   AP View(s) of the abdomen was performed.     COMPARISON:   06/17/2022     FINDINGS:   There is left convex scoliosis of the lumbar spine.  Air seen throughout the colon.  No dilated loops of bowel are identified.  There is osteopenia.    Impression:       No significant stool burden is seen.  A minimal amount of stool and air seen throughout the colon.       Electronically signed by: Jackie Velez MD   Date: 06/18/2022   Time: 06:22   X-Ray Abdomen AP 1 View [[id number]] Resulted: 06/17/22 0749   Order Status: Completed Updated: 06/17/22 0751   Narrative:     EXAMINATION:   XR ABDOMEN AP 1 VIEW     CLINICAL HISTORY:   eval stool burden;     TECHNIQUE:   AP View(s) of the abdomen was performed.     COMPARISON:   None     FINDINGS:   The bowel gas pattern is within normal limits.  No free air is seen.  No masses or calcifications are identified.  There is lumbar scoliosis with degenerative disc disease at multiple levels.  Contrast is seen in the bladder from prior CTA chest.    Impression:       Lumbar scoliosis with degenerative disc disease.  Otherwise negative abdomen x-ray.       Electronically signed by: Rick Berrios MD   Date: 06/17/2022   Time: 07:49   US Lower Extremity Veins Bilateral [[id number]] Resulted: 06/16/22 2125   Order Status: Completed Updated: 06/16/22 2128   Narrative:     EXAMINATION:   US LOWER EXTREMITY VEINS BILATERAL     CLINICAL HISTORY:   r/o DVT;     TECHNIQUE:   Duplex  and color flow Doppler and dynamic compression was performed of the bilateral lower extremity veins was performed.     COMPARISON:   08/12/2020.     FINDINGS:   Right thigh veins: The common femoral, femoral, popliteal, upper greater saphenous, and deep femoral veins are patent and free of thrombus. The veins are normally compressible and have normal phasic flow and augmentation response.     Right calf veins: The visualized calf veins are patent.     Left thigh veins: The common femoral, femoral, popliteal, upper greater saphenous, and deep femoral veins are patent and free of thrombus. The veins are normally compressible and have normal phasic flow and augmentation response.     Left calf veins: The visualized calf veins are patent.     Miscellaneous: None.    Impression:       No evidence of deep venous thrombosis in either lower extremity.       Electronically signed by: Mohsen Prieto   Date: 06/16/2022   Time: 21:25   CTA Chest Non Coronary [[id number]] Resulted: 06/16/22 1602   Order Status: Completed Updated: 06/16/22 1605   Narrative:     EXAMINATION:   CTA CHEST NON CORONARY     CLINICAL HISTORY:   Pulmonary embolism (PE) suspected, positive D-dimer;     TECHNIQUE:   Low dose axial images, sagittal and coronal reformations were obtained from the thoracic inlet to the lung bases following the IV administration of 75 mL of Omnipaque 350.  Contrast timing was optimized to evaluate the pulmonary arteries.  MIP images were performed.     COMPARISON:   Chest x-ray of Yanira 15, 2022     FINDINGS:   There is no CTA evidence of pulmonary embolus.  Opacification of the pulmonary arteries is excellent.  The heart and great vessels are of normal size and contour.  Enlargement or aneurysm is not seen.  Adenopathy or soft tissue masses are not identified other than a moderate size hiatal hernia.     Within the lung fields there is scarring along the posterior pleural surface of the right upper lobe a focal mass or nodule  however is not seen.  No pneumothorax or pleural effusion are noted.    Impression:       No CTA evidence of pulmonary embolus.  Pleural scarring of the posterior surface of the right upper lobe.  Other intrapulmonary masses or infiltrates are not seen.       Electronically signed by: Rick Berrios MD   Date: 06/16/2022   Time: 16:02   X-Ray Abdomen AP 1 View [[id number]] Resulted: 06/16/22 0741   Order Status: Completed Updated: 06/16/22 0744   Narrative:     EXAMINATION:   XR ABDOMEN AP 1 VIEW     CLINICAL HISTORY:   eval stool burden;     TECHNIQUE:   AP View(s) of the abdomen was performed.     COMPARISON:   02/12/2020     FINDINGS:   There is a left convex scoliosis of the lumbar spine.  There is prior repair of a left hip.  The adjacent soft tissues are unremarkable.  There is a Boyd catheter in place.    Impression:       No large stool burden is visualized.       Electronically signed by: Jackie Velez MD   Date: 06/16/2022   Time: 07:41   CT Abdomen Pelvis With Contrast [[id number]] Resulted: 06/15/22 0939   Order Status: Completed Updated: 06/15/22 0941   Narrative:     EXAMINATION:   CT ABDOMEN PELVIS WITH CONTRAST     CLINICAL HISTORY:   Abdominal pain, acute, nonlocalized;     TECHNIQUE:   Low dose axial images, sagittal and coronal reformations were obtained from the lung bases to the pubic symphysis following the IV administration of 75 mL of Omnipaque 350     COMPARISON:   CT abdomen of Yanira 15, 2020 at 00:19     FINDINGS:   There is mild atelectasis at the left lung base.  The liver is of normal size and CT density without focal defect.  The gallbladder is distended without CT evidence of stone.  The pancreas is severely fatty atrophy without edema or mass.  The spleen is of normal size and CT density.     The adrenal glands are not enlarged.  The kidneys are of normal size and contrast enhancement.  A 3 mm left intrarenal stone is seen without hydronephrosis.  No masses are noted.  The  abdominal aorta and inferior vena cava are of normal caliber.     There is a moderate size hiatal hernia.  Small bowel dilatation or air-fluid levels are not seen.  There is an umbilical hernia which contains some fluid and fat.  There is noted distention of the colon filled with fluid down to the sigmoid colon and rectum was is filled with stool.  This could represent a fecal impaction are constipation.  Free air in the abdomen is not seen.     The bladder is no empty with a Boyd catheter in place.  A uterus is not seen.  The patient has had prior ORIF of the left hip.    Impression:       Fluid-filled distension of most of the colon with stool noted in the distal sigmoid colon and rectum may represent a fecal impaction/severe constipation.  Small-bowel obstruction is not seen.  Moderate size hiatal hernia.  Mild atelectasis at the left lung base.  Prior hysterectomy.  Prior ORIF of the left hip.  Lumbar scoliosis and degenerative disc disease at every level.  Gallbladder distention is now seen although a stone or gallbladder wall thickening is not seen.  Small umbilical hernia containing fluid and fat.       Electronically signed by: Rick Berrios MD   Date: 06/15/2022   Time: 09:39   CT Abdomen Pelvis Without Contrast [[id number]] Resulted: 06/15/22 0800   Order Status: Completed Updated: 06/15/22 0803   Narrative:     EXAMINATION:   CT ABDOMEN PELVIS WITHOUT CONTRAST     CLINICAL HISTORY:   GI bleed;     TECHNIQUE:   Low dose axial images, sagittal and coronal reformations were obtained from the lung bases to the pubic symphysis.     COMPARISON:   CT abdomen and pelvis of November 15, 2020 at     FINDINGS:   The liver is of normal size contour and CT density without focal mass.  A single calcified granuloma is seen near the falciform ligament.  The gallbladder is of normal size without CT evidence of stone.  The pancreas is fatty atrophied without a focal mass or edema is seen.  The spleen is small and of  normal CT density.  Calcified granuloma is noted in the left lower lobe.     The adrenal glands are not enlarged.  The kidneys are of normal size contour and CT density.  There is a 3 mm calcification in the lower pole calyx of the left kidney without hydronephrosis or mass.  The abdominal aorta and inferior vena cava are of normal caliber.     There is a moderate size hiatal hernia.  There is an umbilical hernia containing fat and no bowel loop.  Small bowel distention or air-fluid levels are not seen.  The colon is full of stool and dilated down to the descending colon.  There is also stool noted in the rectum.  This may represent constipation.  Free fluid or free air is not seen.     The bladder is of normal contour without mass or asymmetry.  A uterus is not seen.  The patient has had left hip ORIF without metal artifact noted.  Deformity of the right side of the pubic symphysis may be due to old fracture.     The patient is seen to have lumbar scoliosis convex to the left and degenerative disc disease at every level of the lumbar spine.    Impression:       Constipation.  Prior hysterectomy.  3 mm left intrarenal stone.  Prior granulomatous disease.  Moderate size hiatal hernia.     Lumbar scoliosis.  Degenerative disc disease at every level of the lumbar spine.  Prior ORIF left hip.       Electronically signed by: Rick Berrios MD   Date: 06/15/2022   Time: 08:00   X-Ray Chest AP Portable [[id number]] Resulted: 06/15/22 0747   Order Status: Completed Updated: 06/15/22 0750   Narrative:     EXAMINATION:   XR CHEST AP PORTABLE     CLINICAL HISTORY:   Sepsis;     TECHNIQUE:   Single frontal view of the chest was performed.     COMPARISON:   Chest of August 22, 2020     FINDINGS:   The lungs are clear, with normal appearance of pulmonary vasculature and no pleural effusion or pneumothorax.     The cardiac silhouette is normal in size. The hilar and mediastinal contours are unremarkable.     Bones are intact.     Impression:       No acute abnormality.       Electronically signed by: Rick Berrios MD   Date: 06/15/2022   Time: 07:47   CT Head Without Contrast [[id number]] Resulted: 06/15/22 0629   Order Status: Completed Updated: 06/15/22 0632   Narrative:     EXAMINATION:   CT HEAD WITHOUT CONTRAST     CLINICAL HISTORY:   Mental status change, unknown cause;     TECHNIQUE:   Routine unenhanced axial images were obtained through the head.  Sagittal and coronal reformatted images were created.  The study is reviewed in bone and soft tissue windows.     COMPARISON:   Head CT dated 10/04/2019     FINDINGS:   Intracranial contents: There is no acute abnormality or detectable change in the appearance of the brain compared to the prior study.  There is generalized volume loss and nonspecific white matter change.  There is no intracranial hemorrhage, mass or obvious acute infarction.  The gray-white interface is preserved.  There is no midline shift.  There is no abnormal extra-axial fluid collection.  The basilar cisterns are open.  The cerebellar tonsils are normal position.  The patient has had prior left lens replacement surgery.  The orbits are otherwise grossly normal.     Extracranial contents, calvarium, soft tissues: The calvarium is normal.  There are changes of hyperostosis frontalis interna.  The nasal septum is deviated to the left.  The paranasal sinuses and mastoid air cells are grossly clear.  There is degeneration at the TMJ bilaterally.    Impression:       1. There is no acute abnormality.  There is volume loss and nonspecific white matter change.  There is no acute hemorrhage, mass effect or obvious acute infarction.  It should be noted that MRI is more sensitive in the detection of subtle or acute nonhemorrhagic ischemic disease.   Note: Preliminary results were provided by Dr. Otoniel Silva ([facility]).  There is no significant discrepancy.       Electronically signed by: Mitul Cuellar MD   Date: 06/15/2022    Time: 06:29     Microbiology Results (Last 90 Days)    Procedure Component Value Units Date/Time   Blood culture [[id number]] Collected: 06/18/22 1228   Order Status: Completed Specimen: Blood from Antecubital, Right Updated: 06/21/22 0522    Blood Culture, Routine Gram stain peds bottle: Gram positive cocci      Results called to and read back by: Brianna Coyne RN  06/21/2022  05:21   Blood culture x two cultures. Draw prior to antibiotics. [[id number]] (Abnormal) Collected: 06/14/22 2358   Order Status: Completed Specimen: Blood from Peripheral, Right Updated: 06/20/22 0725    Blood Culture, Routine Gram stain aer bottle: Gram positive cocci in clusters resembling Staph      Results called to and read back by: Yanelis Edwards 06/18/2022  06:24     MICROCOCCUS SPECIES   Organism is a probable contaminant    Abnormal    Narrative:     Aerobic and anaerobic   Blood culture x two cultures. Draw prior to antibiotics. [[id number]] Collected: 06/14/22 2357   Order Status: Completed Specimen: Blood Updated: 06/20/22 1212    Blood Culture, Routine No growth after 5 days.   Narrative:     Aerobic and anaerobic         Pending Diagnostic Studies:     None         Medications:  Reconciled Home Medications:      Medication List      START taking these medications    amiodarone 200 MG Tab  Commonly known as: PACERONE  Take 1 tablet (200 mg total) by mouth once daily.     apixaban 2.5 mg Tab  Commonly known as: ELIQUIS  Take 1 tablet (2.5 mg total) by mouth 2 (two) times daily.     docusate sodium 100 MG capsule  Commonly known as: COLACE  Take 1 capsule (100 mg total) by mouth 2 (two) times daily.     metoprolol tartrate 50 MG tablet  Commonly known as: LOPRESSOR  Take 1 tablet (50 mg total) by mouth 3 (three) times daily.     polyethylene glycol 17 gram Pwpk  Commonly known as: GLYCOLAX  Take 17 g by mouth 2 (two) times daily.        CONTINUE taking these medications    acetaminophen 325 MG tablet  Commonly known as:  TYLENOL  Take 650 mg by mouth once.     amlodipine-benazepril 5-20 mg 5-20 mg per capsule  Commonly known as: LOTREL  Take 1 capsule by mouth once daily.     ascorbic acid (vitamin C) 250 MG tablet  Commonly known as: VITAMIN C  Take 1 tablet (250 mg total) by mouth once daily.     cyanocobalamin 100 MCG tablet  Commonly known as: VITAMIN B-12  Take 1 tablet (100 mcg total) by mouth once daily.     cyclobenzaprine 10 MG tablet  Commonly known as: FLEXERIL  TAKE 1 TABLET(10 MG) BY MOUTH THREE TIMES DAILY AS NEEDED FOR MUSCLE SPASMS     EScitalopram oxalate 20 MG tablet  Commonly known as: LEXAPRO  TAKE 1 TABLET(20 MG) BY MOUTH EVERY DAY     ferrous sulfate Tab tablet  Commonly known as: FEOSOL  Take 1 tablet (1 each total) by mouth daily with breakfast.     furosemide 40 MG tablet  Commonly known as: LASIX  Take 40 mg by mouth daily as needed.     omeprazole 40 MG capsule  Commonly known as: PRILOSEC  Take 1 capsule (40 mg total) by mouth 2 (two) times daily before meals.     pregabalin 200 MG Cap  Commonly known as: LYRICA  TAKE 1 CAPSULE(200 MG) BY MOUTH THREE TIMES DAILY        STOP taking these medications    oxyCODONE-acetaminophen  mg per tablet  Commonly known as: PERCOCET     pantoprazole 40 MG tablet  Commonly known as: PROTONIX            Indwelling Lines/Drains at time of discharge:   Lines/Drains/Airways     None                 Time spent on the discharge of patient: 35 minutes         Tamara Vargas MD  Department of Hospital Medicine  [facility]

## 2022-06-22 ENCOUNTER — TELEPHONE (OUTPATIENT)
Dept: INFECTIOUS DISEASES | Facility: CLINIC | Age: 77
End: 2022-06-22

## 2022-06-22 ENCOUNTER — TELEPHONE (OUTPATIENT)
Dept: MEDSURG UNIT | Facility: HOSPITAL | Age: 77
End: 2022-06-22
Payer: MEDICARE

## 2022-06-22 DIAGNOSIS — R78.81 BLOOD BACTERIAL CULTURE POSITIVE: Primary | ICD-10-CM

## 2022-06-22 NOTE — PHYSICIAN QUERY
PT Name: Froilan Ray  MR #: [id number]     DOCUMENTATION CLARIFICATION      CDS/: Omar Corrigan RN, BSN      Contact information: [email or web address]    This form is a permanent document in the medical record.     Query Date: June 22, 2022    Dear Provider,  By submitting this query, we are merely seeking further clarification of documentation.  Please utilize your independent clinical judgment when addressing the question(s) below.     The Medical Record contains the following:    Supporting Clinical Findings Location in Medical Record   Assessment/Plan:  Encephalopathy, metabolic  Acute problem  Patient has acute metabolic encephalopathy that is secondary to Sepsis/Infective.   PEDRO (acute kidney injury)  Sepsis  This patient does have evidence of infective focus  My overall impression is sepsis. Vital signs were reviewed and noted in progress note.  Antibiotics given-    H&P 6/15/22   Patient was admitted to the hospital medicine service for abdominal pain and altered mental status.  Concern for sepsis. She was started on broad-spectrum antibiotics and ID was consulted.    ER workup:  CBC with mild anemia.  CMP with BUN of 31 creatinine 1.9 with bicarb of 18. Initial lactic acid was 1.4.  Second lactic acid was 3.6.  ABG with pH of 7.3, PO2 of 65, bicarb 19, pCO2 of 38. CT of the head was negative for any acute findings.  CT abdomen and pelvis demonstrates a colonic obstruction secondary to constipation of the rectosigmoid junction.  Patient was started on septic protocol.     She was monitored when admission blood culture grew Gram-positive cocci in clusters resembling Staph and placed back on antibiotics for [place]..  Blood culture grew micrococcus deemed contaminant so antibiotics were discontinued.   DS 6/20/22 (filed 6/21/22)      DS 6/20/22 (filed 6/21/22)          DS 6/20/22 (filed 6/21/22)   Hypovolemic shock resolved, likely secondary to significant constipation in the setting of chronic  opiates               Lactic acid 4-->1.1 Infectious Disease PN 6/16/22   There was also initial concern for acute abdomen.  Surgery and GI were consulted and repeat CT scan was obtained.  It was determined the patient did not have a surgical abdomen but instead was profoundly constipated.  She was started on regular enemas on a bowel regimen.  Abdominal x-ray was monitored and stool burden decreased.  DS 6/20/22 (filed 6/21/22)     Please clarify if the ________Sepsis__________ diagnosis has been:    [  ] Ruled In     [  ] Ruled In, Now Resolved     [ x ] Ruled Out     [  ] Still to be ruled out at the time of discharge     [  ] Other/Clarification of findings (please specify): _______________      [   ] Clinically undetermined     Please document in your progress notes daily for the duration of treatment, until resolved, and include in your discharge summary.    Form No. [id number]

## 2022-06-23 ENCOUNTER — TELEPHONE (OUTPATIENT)
Dept: MEDSURG UNIT | Facility: HOSPITAL | Age: 77
End: 2022-06-23
Payer: MEDICARE

## 2022-06-23 ENCOUNTER — LAB VISIT (OUTPATIENT)
Dept: LAB | Facility: HOSPITAL | Age: 77
End: 2022-06-23
Attending: STUDENT IN AN ORGANIZED HEALTH CARE EDUCATION/TRAINING PROGRAM
Payer: MEDICARE

## 2022-06-23 DIAGNOSIS — R78.81 BLOOD BACTERIAL CULTURE POSITIVE: ICD-10-CM

## 2022-06-23 PROCEDURE — 87040 BLOOD CULTURE FOR BACTERIA: CPT | Performed by: STUDENT IN AN ORGANIZED HEALTH CARE EDUCATION/TRAINING PROGRAM

## 2022-06-23 PROCEDURE — 36415 COLL VENOUS BLD VENIPUNCTURE: CPT | Performed by: STUDENT IN AN ORGANIZED HEALTH CARE EDUCATION/TRAINING PROGRAM

## 2022-06-23 RX ORDER — CLARITHROMYCIN 500 MG/1
500 TABLET, FILM COATED ORAL 2 TIMES DAILY
Qty: 28 TABLET | Refills: 0 | Status: SHIPPED | OUTPATIENT
Start: 2022-06-23 | End: 2022-07-07

## 2022-06-23 RX ORDER — AMOXICILLIN 500 MG/1
1000 CAPSULE ORAL 2 TIMES DAILY
Qty: 56 CAPSULE | Refills: 0 | Status: SHIPPED | OUTPATIENT
Start: 2022-06-23 | End: 2022-07-07

## 2022-06-23 NOTE — TELEPHONE ENCOUNTER
Repeat blood cultures before discharge grew Staph pettenkoferi, 1/2 bottles, likely a contamiant - will repeat 2 sets.

## 2022-06-24 LAB
BACTERIA BLD CULT: ABNORMAL

## 2022-06-25 LAB
6MAM UR QL: NOT DETECTED
7AMINOCLONAZEPAM UR QL: NOT DETECTED
A-OH ALPRAZ UR QL: NOT DETECTED
ALPHA-OH-MIDAZOLAM: NOT DETECTED
ALPRAZ UR QL: NOT DETECTED
AMPHET UR QL SCN: NOT DETECTED
ANNOTATION COMMENT IMP: NORMAL
ANNOTATION COMMENT IMP: NORMAL
BARBITURATES UR QL: NOT DETECTED
BUPRENORPHINE UR QL: NOT DETECTED
BZE UR QL: NOT DETECTED
CARBOXYTHC UR QL: NOT DETECTED
CARISOPRODOL UR QL: NOT DETECTED
CLONAZEPAM UR QL: NOT DETECTED
CODEINE UR QL: NOT DETECTED
CREAT UR-MCNC: 65.2 MG/DL (ref 20–400)
DIAZEPAM UR QL: NOT DETECTED
ETHYL GLUCURONIDE UR QL: NOT DETECTED
FENTANYL UR QL: NOT DETECTED
GABAPENTIN: NOT DETECTED
HYDROCODONE UR QL: NOT DETECTED
HYDROMORPHONE UR QL: NOT DETECTED
LORAZEPAM UR QL: NOT DETECTED
MDA UR QL: NOT DETECTED
MDEA UR QL: NOT DETECTED
MDMA UR QL: NOT DETECTED
ME-PHENIDATE UR QL: NOT DETECTED
METHADONE UR QL: NOT DETECTED
METHAMPHET UR QL: NOT DETECTED
MIDAZOLAM UR QL SCN: NOT DETECTED
MORPHINE UR QL: PRESENT
NALOXONE: NOT DETECTED
NORBUPRENORPHINE UR QL CFM: NOT DETECTED
NORDIAZEPAM UR QL: NOT DETECTED
NORFENTANYL UR QL: NOT DETECTED
NORHYDROCODONE UR QL CFM: NOT DETECTED
NORMEPERIDINE UR QL CFM: NOT DETECTED
NOROXYCODONE UR QL CFM: PRESENT
NOROXYMORPHONE UR QL SCN: PRESENT
OXAZEPAM UR QL: NOT DETECTED
OXYCODONE UR QL: PRESENT
OXYMORPHONE UR QL: PRESENT
PATHOLOGY STUDY: NORMAL
PCP UR QL: NOT DETECTED
PHENTERMINE UR QL: NOT DETECTED
PREGABALIN: PRESENT
SERVICE CMNT-IMP: NORMAL
TAPENTADOL UR QL SCN: NOT DETECTED
TAPENTADOL UR QL SCN: NOT DETECTED
TEMAZEPAM UR QL: NOT DETECTED
TRAMADOL UR QL: NOT DETECTED
ZOLPIDEM METABOLITE: NOT DETECTED
ZOLPIDEM UR QL: NOT DETECTED

## 2022-06-27 ENCOUNTER — LAB VISIT (OUTPATIENT)
Dept: LAB | Facility: HOSPITAL | Age: 77
End: 2022-06-27
Attending: INTERNAL MEDICINE
Payer: MEDICARE

## 2022-06-27 DIAGNOSIS — K59.03 DRUG-INDUCED CONSTIPATION: ICD-10-CM

## 2022-06-27 DIAGNOSIS — D50.0 IRON DEFICIENCY ANEMIA DUE TO CHRONIC BLOOD LOSS: ICD-10-CM

## 2022-06-27 DIAGNOSIS — I10 PRIMARY HYPERTENSION: ICD-10-CM

## 2022-06-27 DIAGNOSIS — E53.8 B12 DEFICIENCY: ICD-10-CM

## 2022-06-27 LAB
BASOPHILS # BLD AUTO: 0.06 K/UL (ref 0–0.2)
BASOPHILS NFR BLD: 0.6 % (ref 0–1.9)
DIFFERENTIAL METHOD: ABNORMAL
EOSINOPHIL # BLD AUTO: 0.2 K/UL (ref 0–0.5)
EOSINOPHIL NFR BLD: 1.7 % (ref 0–8)
ERYTHROCYTE [DISTWIDTH] IN BLOOD BY AUTOMATED COUNT: 15.9 % (ref 11.5–14.5)
FERRITIN SERPL-MCNC: 184 NG/ML (ref 20–300)
HCT VFR BLD AUTO: 29.6 % (ref 37–48.5)
HGB BLD-MCNC: 9 G/DL (ref 12–16)
IMM GRANULOCYTES # BLD AUTO: 0.04 K/UL (ref 0–0.04)
IMM GRANULOCYTES NFR BLD AUTO: 0.4 % (ref 0–0.5)
IRON SERPL-MCNC: 19 UG/DL (ref 30–160)
LYMPHOCYTES # BLD AUTO: 1.2 K/UL (ref 1–4.8)
LYMPHOCYTES NFR BLD: 11.1 % (ref 18–48)
MCH RBC QN AUTO: 28.9 PG (ref 27–31)
MCHC RBC AUTO-ENTMCNC: 30.4 G/DL (ref 32–36)
MCV RBC AUTO: 95 FL (ref 82–98)
MONOCYTES # BLD AUTO: 0.9 K/UL (ref 0.3–1)
MONOCYTES NFR BLD: 8.4 % (ref 4–15)
NEUTROPHILS # BLD AUTO: 8 K/UL (ref 1.8–7.7)
NEUTROPHILS NFR BLD: 77.8 % (ref 38–73)
NRBC BLD-RTO: 0 /100 WBC
PLATELET # BLD AUTO: 658 K/UL (ref 150–450)
PMV BLD AUTO: 9.3 FL (ref 9.2–12.9)
RBC # BLD AUTO: 3.11 M/UL (ref 4–5.4)
SATURATED IRON: 9 % (ref 20–50)
TOTAL IRON BINDING CAPACITY: 204 UG/DL (ref 250–450)
TRANSFERRIN SERPL-MCNC: 138 MG/DL (ref 200–375)
WBC # BLD AUTO: 10.34 K/UL (ref 3.9–12.7)

## 2022-06-27 PROCEDURE — 85025 COMPLETE CBC W/AUTO DIFF WBC: CPT | Performed by: INTERNAL MEDICINE

## 2022-06-27 PROCEDURE — 36415 COLL VENOUS BLD VENIPUNCTURE: CPT | Performed by: INTERNAL MEDICINE

## 2022-06-27 PROCEDURE — 82728 ASSAY OF FERRITIN: CPT | Performed by: INTERNAL MEDICINE

## 2022-06-27 PROCEDURE — 84466 ASSAY OF TRANSFERRIN: CPT | Performed by: INTERNAL MEDICINE

## 2022-06-28 LAB — BACTERIA BLD CULT: NORMAL

## 2022-06-29 ENCOUNTER — TELEPHONE (OUTPATIENT)
Dept: HEMATOLOGY/ONCOLOGY | Facility: CLINIC | Age: 77
End: 2022-06-29

## 2022-06-29 ENCOUNTER — OFFICE VISIT (OUTPATIENT)
Dept: HEMATOLOGY/ONCOLOGY | Facility: CLINIC | Age: 77
End: 2022-06-29
Payer: MEDICARE

## 2022-06-29 VITALS
BODY MASS INDEX: 26.61 KG/M2 | RESPIRATION RATE: 16 BRPM | WEIGHT: 165.56 LBS | HEART RATE: 65 BPM | SYSTOLIC BLOOD PRESSURE: 153 MMHG | DIASTOLIC BLOOD PRESSURE: 72 MMHG | TEMPERATURE: 98 F | OXYGEN SATURATION: 96 % | HEIGHT: 66 IN

## 2022-06-29 DIAGNOSIS — K59.03 DRUG-INDUCED CONSTIPATION: ICD-10-CM

## 2022-06-29 DIAGNOSIS — E53.8 B12 DEFICIENCY: Primary | ICD-10-CM

## 2022-06-29 DIAGNOSIS — D50.0 IRON DEFICIENCY ANEMIA DUE TO CHRONIC BLOOD LOSS: ICD-10-CM

## 2022-06-29 DIAGNOSIS — I10 PRIMARY HYPERTENSION: ICD-10-CM

## 2022-06-29 PROCEDURE — 3077F SYST BP >= 140 MM HG: CPT | Mod: CPTII,S$GLB,, | Performed by: INTERNAL MEDICINE

## 2022-06-29 PROCEDURE — 1101F PR PT FALLS ASSESS DOC 0-1 FALLS W/OUT INJ PAST YR: ICD-10-PCS | Mod: CPTII,S$GLB,, | Performed by: INTERNAL MEDICINE

## 2022-06-29 PROCEDURE — 99999 PR PBB SHADOW E&M-EST. PATIENT-LVL IV: CPT | Mod: PBBFAC,,, | Performed by: INTERNAL MEDICINE

## 2022-06-29 PROCEDURE — 3008F BODY MASS INDEX DOCD: CPT | Mod: CPTII,S$GLB,, | Performed by: INTERNAL MEDICINE

## 2022-06-29 PROCEDURE — 3078F PR MOST RECENT DIASTOLIC BLOOD PRESSURE < 80 MM HG: ICD-10-PCS | Mod: CPTII,S$GLB,, | Performed by: INTERNAL MEDICINE

## 2022-06-29 PROCEDURE — 4010F ACE/ARB THERAPY RXD/TAKEN: CPT | Mod: CPTII,S$GLB,, | Performed by: INTERNAL MEDICINE

## 2022-06-29 PROCEDURE — 99214 PR OFFICE/OUTPT VISIT, EST, LEVL IV, 30-39 MIN: ICD-10-PCS | Mod: S$GLB,,, | Performed by: INTERNAL MEDICINE

## 2022-06-29 PROCEDURE — 1159F PR MEDICATION LIST DOCUMENTED IN MEDICAL RECORD: ICD-10-PCS | Mod: CPTII,S$GLB,, | Performed by: INTERNAL MEDICINE

## 2022-06-29 PROCEDURE — 1101F PT FALLS ASSESS-DOCD LE1/YR: CPT | Mod: CPTII,S$GLB,, | Performed by: INTERNAL MEDICINE

## 2022-06-29 PROCEDURE — 3288F FALL RISK ASSESSMENT DOCD: CPT | Mod: CPTII,S$GLB,, | Performed by: INTERNAL MEDICINE

## 2022-06-29 PROCEDURE — 1125F PR PAIN SEVERITY QUANTIFIED, PAIN PRESENT: ICD-10-PCS | Mod: CPTII,S$GLB,, | Performed by: INTERNAL MEDICINE

## 2022-06-29 PROCEDURE — 99999 PR PBB SHADOW E&M-EST. PATIENT-LVL IV: ICD-10-PCS | Mod: PBBFAC,,, | Performed by: INTERNAL MEDICINE

## 2022-06-29 PROCEDURE — 3288F PR FALLS RISK ASSESSMENT DOCUMENTED: ICD-10-PCS | Mod: CPTII,S$GLB,, | Performed by: INTERNAL MEDICINE

## 2022-06-29 PROCEDURE — 1111F DSCHRG MED/CURRENT MED MERGE: CPT | Mod: CPTII,S$GLB,, | Performed by: INTERNAL MEDICINE

## 2022-06-29 PROCEDURE — 3077F PR MOST RECENT SYSTOLIC BLOOD PRESSURE >= 140 MM HG: ICD-10-PCS | Mod: CPTII,S$GLB,, | Performed by: INTERNAL MEDICINE

## 2022-06-29 PROCEDURE — 99214 OFFICE O/P EST MOD 30 MIN: CPT | Mod: S$GLB,,, | Performed by: INTERNAL MEDICINE

## 2022-06-29 PROCEDURE — 3078F DIAST BP <80 MM HG: CPT | Mod: CPTII,S$GLB,, | Performed by: INTERNAL MEDICINE

## 2022-06-29 PROCEDURE — 1125F AMNT PAIN NOTED PAIN PRSNT: CPT | Mod: CPTII,S$GLB,, | Performed by: INTERNAL MEDICINE

## 2022-06-29 PROCEDURE — 1111F PR DISCHARGE MEDS RECONCILED W/ CURRENT OUTPATIENT MED LIST: ICD-10-PCS | Mod: CPTII,S$GLB,, | Performed by: INTERNAL MEDICINE

## 2022-06-29 PROCEDURE — 3008F PR BODY MASS INDEX (BMI) DOCUMENTED: ICD-10-PCS | Mod: CPTII,S$GLB,, | Performed by: INTERNAL MEDICINE

## 2022-06-29 PROCEDURE — 1159F MED LIST DOCD IN RCRD: CPT | Mod: CPTII,S$GLB,, | Performed by: INTERNAL MEDICINE

## 2022-06-29 PROCEDURE — 4010F PR ACE/ARB THEARPY RXD/TAKEN: ICD-10-PCS | Mod: CPTII,S$GLB,, | Performed by: INTERNAL MEDICINE

## 2022-06-29 NOTE — TELEPHONE ENCOUNTER
Patient opted to not continue her IV iron but to start taking PO iron. Dr. Corrales is aware of this decision      ----- Message from Shemar Corrales MD sent at 6/29/2022 11:07 AM CDT -----  Continue 3 more IV iron therapy weekly and return to clinic 5 weeks

## 2022-06-29 NOTE — PROGRESS NOTES
HPI      76 years old female referred to [facility] for evaluation of iron deficiency anemia.  Patient has irritable bowel syndrome, bleeding ulcer, hypertension, and peripheral neuropathy of both feet.    Patient has hypertension is on water pill.  She is complaining of fatigue.    Past Medical History:   Diagnosis Date    Anemia     Arthritis     Bleeding ulcer 07/2016    Chronic pain     DDD (degenerative disc disease), cervical     DDD (degenerative disc disease), lumbar     Depression     Disc degeneration, lumbosacral     Diverticulitis     Encounter for blood transfusion     Hypertension     IBS (irritable bowel syndrome)     Neuropathy of both feet     Seizures     none  since 2017    Umbilical hernia 2020     Social History     Socioeconomic History    Marital status:    Tobacco Use    Smoking status: Never Smoker    Smokeless tobacco: Never Used   Substance and Sexual Activity    Alcohol use: No    Drug use: No    Sexual activity: Not Currently         Subjective      Review of Systems   Constitutional:  Fatigue   HENT: Negative for mouth sores.   Eyes: Negative for visual disturbance.   Respiratory: Negative for cough and shortness of breath.   Cardiovascular: Negative for chest pain.   Gastrointestinal: Negative for diarrhea.   Genitourinary: Negative for frequency.   Musculoskeletal: Negative for back pain.   Skin: Negative for rash.   Neurological: Negative for headaches.   Hematological: Negative for adenopathy.   Psychiatric/Behavioral: The patient is not nervous/anxious.   All other systems reviewed and are negative.     Objective    Physical Exam     Awake alert no acute distress  Normocephalic atraumatic  Pupils equal round reactive  Soft nontender nondistended  Extraocular muscle intact  No rash no lesions  Nonfocal      CMP  Sodium   Date Value Ref Range Status   06/20/2022 139 136 - 145 mmol/L Final     Potassium   Date Value Ref Range Status   06/20/2022  3.6 3.5 - 5.1 mmol/L Final     Chloride   Date Value Ref Range Status   06/20/2022 107 95 - 110 mmol/L Final     CO2   Date Value Ref Range Status   06/20/2022 20 (L) 23 - 29 mmol/L Final     Glucose   Date Value Ref Range Status   06/20/2022 70 70 - 110 mg/dL Final     BUN   Date Value Ref Range Status   06/20/2022 4 (L) 8 - 23 mg/dL Final     Creatinine   Date Value Ref Range Status   06/20/2022 0.6 0.5 - 1.4 mg/dL Final     Calcium   Date Value Ref Range Status   06/20/2022 7.7 (L) 8.7 - 10.5 mg/dL Final     Total Protein   Date Value Ref Range Status   06/20/2022 5.1 (L) 6.0 - 8.4 g/dL Final     Albumin   Date Value Ref Range Status   06/20/2022 2.3 (L) 3.5 - 5.2 g/dL Final     Total Bilirubin   Date Value Ref Range Status   06/20/2022 0.4 0.1 - 1.0 mg/dL Final     Comment:     For infants and newborns, interpretation of results should be based  on gestational age, weight and in agreement with clinical  observations.    Premature Infant recommended reference ranges:  Up to 24 hours.............<8.0 mg/dL  Up to 48 hours............<12.0 mg/dL  3-5 days..................<15.0 mg/dL  6-29 days.................<15.0 mg/dL       Alkaline Phosphatase   Date Value Ref Range Status   06/20/2022 82 55 - 135 U/L Final     AST   Date Value Ref Range Status   06/20/2022 16 10 - 40 U/L Final     ALT   Date Value Ref Range Status   06/20/2022 12 10 - 44 U/L Final     Anion Gap   Date Value Ref Range Status   06/20/2022 12 8 - 16 mmol/L Final     eGFR if    Date Value Ref Range Status   06/20/2022 >60 >60 mL/min/1.73 m^2 Final     eGFR if non    Date Value Ref Range Status   06/20/2022 >60 >60 mL/min/1.73 m^2 Final     Comment:     Calculation used to obtain the estimated glomerular filtration  rate (eGFR) is the CKD-EPI equation.        Lab Results   Component Value Date    WBC 10.34 06/27/2022    HGB 9.0 (L) 06/27/2022    HCT 29.6 (L) 06/27/2022    MCV 95 06/27/2022     (H) 06/27/2022        Intrinsic factor blocking antibodies negative    Anti parietal antibodies negative    Folate normal    Vitamin B12 198     Ferritin 9    Iron saturation 9%    Assessment    Anemia    Upper EGD 06/10/2022 nonbleeding gastric ulcer pathology shows reactive gastropathy.  Negative for H pylori.    Colonoscopy 01/14/2022 diverticulosis in the sigmoid colon.  Internal hemorrhoids.    Vitamin B12 deficiency    Patient previously have tried oral iron supplement as well as vitamin B12 which cause significant nausea which lead to noncompliance.  Her hemoglobin a anemia is mildly low.  Her iron storage is little low  however serum iron concentration is okay.    Fatigue    Recent hospitalization for colonic obstruction and sepsis    Plan    Completed IV iron 200 Venofer with premedication Tylenol and Benadryl x3/6.  Complete 3 more IV iron therapy treatment is recommended.  However patient declined any further IV iron therapy.  She states that IV iron therapy make her feel bad.  She will rather do oral iron supplement.  She understands that if oral iron supplement is insufficient and her hemoglobin drops she may need a transfusion instead.  I will see patient is a 10 weeks from now with blood work.    I am also encouraged patient to continue taking oral vitamin B        There are no diagnoses linked to this encounter.

## 2022-06-30 ENCOUNTER — OFFICE VISIT (OUTPATIENT)
Dept: FAMILY MEDICINE | Facility: CLINIC | Age: 77
End: 2022-06-30
Payer: MEDICARE

## 2022-06-30 VITALS
TEMPERATURE: 98 F | WEIGHT: 164 LBS | HEART RATE: 80 BPM | DIASTOLIC BLOOD PRESSURE: 78 MMHG | BODY MASS INDEX: 26.36 KG/M2 | HEIGHT: 66 IN | SYSTOLIC BLOOD PRESSURE: 136 MMHG

## 2022-06-30 DIAGNOSIS — Z99.89 USES WALKER: ICD-10-CM

## 2022-06-30 DIAGNOSIS — I50.20 HFREF (HEART FAILURE WITH REDUCED EJECTION FRACTION): ICD-10-CM

## 2022-06-30 DIAGNOSIS — D50.9 IRON DEFICIENCY ANEMIA, UNSPECIFIED IRON DEFICIENCY ANEMIA TYPE: ICD-10-CM

## 2022-06-30 DIAGNOSIS — Z87.81 S/P ORIF (OPEN REDUCTION INTERNAL FIXATION) FRACTURE: ICD-10-CM

## 2022-06-30 DIAGNOSIS — M48.00 SPINAL STENOSIS, UNSPECIFIED SPINAL REGION: Primary | ICD-10-CM

## 2022-06-30 DIAGNOSIS — M81.0 OSTEOPOROSIS, UNSPECIFIED OSTEOPOROSIS TYPE, UNSPECIFIED PATHOLOGICAL FRACTURE PRESENCE: ICD-10-CM

## 2022-06-30 DIAGNOSIS — Z12.31 BREAST CANCER SCREENING BY MAMMOGRAM: ICD-10-CM

## 2022-06-30 DIAGNOSIS — Z13.820 SPECIAL SCREENING FOR OSTEOPOROSIS: ICD-10-CM

## 2022-06-30 DIAGNOSIS — Z87.898 HISTORY OF SEIZURES: ICD-10-CM

## 2022-06-30 DIAGNOSIS — Z79.01 ANTICOAGULATED: ICD-10-CM

## 2022-06-30 DIAGNOSIS — F32.A DEPRESSION, UNSPECIFIED DEPRESSION TYPE: ICD-10-CM

## 2022-06-30 DIAGNOSIS — Z78.0 MENOPAUSE: ICD-10-CM

## 2022-06-30 DIAGNOSIS — I10 ESSENTIAL HYPERTENSION: ICD-10-CM

## 2022-06-30 DIAGNOSIS — I48.91 ATRIAL FIBRILLATION, UNSPECIFIED TYPE: ICD-10-CM

## 2022-06-30 DIAGNOSIS — G89.29 OTHER CHRONIC PAIN: ICD-10-CM

## 2022-06-30 DIAGNOSIS — Z98.890 S/P ORIF (OPEN REDUCTION INTERNAL FIXATION) FRACTURE: ICD-10-CM

## 2022-06-30 PROCEDURE — 1160F PR REVIEW ALL MEDS BY PRESCRIBER/CLIN PHARMACIST DOCUMENTED: ICD-10-PCS | Mod: CPTII,S$GLB,, | Performed by: FAMILY MEDICINE

## 2022-06-30 PROCEDURE — 3075F PR MOST RECENT SYSTOLIC BLOOD PRESS GE 130-139MM HG: ICD-10-PCS | Mod: CPTII,S$GLB,, | Performed by: FAMILY MEDICINE

## 2022-06-30 PROCEDURE — 3078F PR MOST RECENT DIASTOLIC BLOOD PRESSURE < 80 MM HG: ICD-10-PCS | Mod: CPTII,S$GLB,, | Performed by: FAMILY MEDICINE

## 2022-06-30 PROCEDURE — 1111F PR DISCHARGE MEDS RECONCILED W/ CURRENT OUTPATIENT MED LIST: ICD-10-PCS | Mod: CPTII,S$GLB,, | Performed by: FAMILY MEDICINE

## 2022-06-30 PROCEDURE — 4010F PR ACE/ARB THEARPY RXD/TAKEN: ICD-10-PCS | Mod: CPTII,S$GLB,, | Performed by: FAMILY MEDICINE

## 2022-06-30 PROCEDURE — 1159F MED LIST DOCD IN RCRD: CPT | Mod: CPTII,S$GLB,, | Performed by: FAMILY MEDICINE

## 2022-06-30 PROCEDURE — 3288F PR FALLS RISK ASSESSMENT DOCUMENTED: ICD-10-PCS | Mod: CPTII,S$GLB,, | Performed by: FAMILY MEDICINE

## 2022-06-30 PROCEDURE — 1111F DSCHRG MED/CURRENT MED MERGE: CPT | Mod: CPTII,S$GLB,, | Performed by: FAMILY MEDICINE

## 2022-06-30 PROCEDURE — 3008F PR BODY MASS INDEX (BMI) DOCUMENTED: ICD-10-PCS | Mod: CPTII,S$GLB,, | Performed by: FAMILY MEDICINE

## 2022-06-30 PROCEDURE — 3075F SYST BP GE 130 - 139MM HG: CPT | Mod: CPTII,S$GLB,, | Performed by: FAMILY MEDICINE

## 2022-06-30 PROCEDURE — 3078F DIAST BP <80 MM HG: CPT | Mod: CPTII,S$GLB,, | Performed by: FAMILY MEDICINE

## 2022-06-30 PROCEDURE — 3288F FALL RISK ASSESSMENT DOCD: CPT | Mod: CPTII,S$GLB,, | Performed by: FAMILY MEDICINE

## 2022-06-30 PROCEDURE — 1160F RVW MEDS BY RX/DR IN RCRD: CPT | Mod: CPTII,S$GLB,, | Performed by: FAMILY MEDICINE

## 2022-06-30 PROCEDURE — 99214 PR OFFICE/OUTPT VISIT, EST, LEVL IV, 30-39 MIN: ICD-10-PCS | Mod: S$GLB,,, | Performed by: FAMILY MEDICINE

## 2022-06-30 PROCEDURE — 3008F BODY MASS INDEX DOCD: CPT | Mod: CPTII,S$GLB,, | Performed by: FAMILY MEDICINE

## 2022-06-30 PROCEDURE — 1159F PR MEDICATION LIST DOCUMENTED IN MEDICAL RECORD: ICD-10-PCS | Mod: CPTII,S$GLB,, | Performed by: FAMILY MEDICINE

## 2022-06-30 PROCEDURE — 1101F PR PT FALLS ASSESS DOC 0-1 FALLS W/OUT INJ PAST YR: ICD-10-PCS | Mod: CPTII,S$GLB,, | Performed by: FAMILY MEDICINE

## 2022-06-30 PROCEDURE — 99214 OFFICE O/P EST MOD 30 MIN: CPT | Mod: S$GLB,,, | Performed by: FAMILY MEDICINE

## 2022-06-30 PROCEDURE — 4010F ACE/ARB THERAPY RXD/TAKEN: CPT | Mod: CPTII,S$GLB,, | Performed by: FAMILY MEDICINE

## 2022-06-30 PROCEDURE — 1101F PT FALLS ASSESS-DOCD LE1/YR: CPT | Mod: CPTII,S$GLB,, | Performed by: FAMILY MEDICINE

## 2022-06-30 RX ORDER — FUROSEMIDE 40 MG/1
40 TABLET ORAL DAILY PRN
Qty: 90 TABLET | Refills: 1 | Status: SHIPPED | OUTPATIENT
Start: 2022-06-30 | End: 2022-12-25

## 2022-06-30 RX ORDER — POTASSIUM CHLORIDE 750 MG/1
10 TABLET, EXTENDED RELEASE ORAL DAILY
Qty: 90 TABLET | Refills: 1 | Status: SHIPPED | OUTPATIENT
Start: 2022-06-30 | End: 2022-12-25

## 2022-06-30 RX ORDER — ESCITALOPRAM OXALATE 20 MG/1
20 TABLET ORAL DAILY
Qty: 90 TABLET | Refills: 1 | Status: SHIPPED | OUTPATIENT
Start: 2022-06-30 | End: 2022-10-04 | Stop reason: SDUPTHER

## 2022-06-30 RX ORDER — FERROUS SULFATE 325(65) MG
325 TABLET, DELAYED RELEASE (ENTERIC COATED) ORAL DAILY
Qty: 90 TABLET | Refills: 1 | Status: SHIPPED | OUTPATIENT
Start: 2022-06-30 | End: 2023-12-06

## 2022-07-01 ENCOUNTER — TELEPHONE (OUTPATIENT)
Dept: HEMATOLOGY/ONCOLOGY | Facility: CLINIC | Age: 77
End: 2022-07-01
Payer: MEDICARE

## 2022-07-01 NOTE — TELEPHONE ENCOUNTER
See outgoing staff message to [facility] Sched[facility] regarding this patient's Venofer orders below.

## 2022-07-03 NOTE — PROGRESS NOTES
Subjective:       Patient ID: Froilan Ray is a 76 y.o. female.    Chief Complaint: Establish Care ([facility])    Was seeing Dr. Justin richard before.    Social history no alcohol no smoking.  Retired nurse.  Family history of hypertension in diabetes mellitus type 2.    Past medical history.  Chronic back pain.  Sees Pain Management.  Dr. Marcelino.  Three back surgeries.  Does last surgeries made her worse.  Injections have not helped.  Uses cane or walker.  History of seizures off medication for years.  Hypertension which is controlled.  Atrial fibrillation.  Gone amiodarone.  Anticoagulated.  Hospitalized for bowel obstruction due to impaction.  Status post ORIF of the left hip.  Also has osteoporosis.  Has iron deficiency anemia with the hemoglobin of 9 MCV currently 95.  Status post complete hysterectomy.  Has heart failure reduced ejection fraction most recent EF 50%.  Depression also.    Review of Systems   Constitutional: Negative.    HENT: Negative.    Eyes: Negative.    Respiratory: Negative.    Cardiovascular: Negative.    Gastrointestinal: Negative.    Endocrine: Negative.    Genitourinary: Negative.    Musculoskeletal: Positive for back pain.   Skin: Negative.    Allergic/Immunologic: Negative.    Neurological: Negative.    Hematological: Negative.    Psychiatric/Behavioral: Negative.    All other systems reviewed and are negative.      Objective:      Physical Exam  Vitals and nursing note reviewed.   Constitutional:       Appearance: She is well-developed.   HENT:      Head: Normocephalic and atraumatic.      Nose: Nose normal.   Eyes:      Conjunctiva/sclera: Conjunctivae normal.      Pupils: Pupils are equal, round, and reactive to light.   Neck:      Vascular: No carotid bruit.   Cardiovascular:      Rate and Rhythm: Normal rate and regular rhythm.      Heart sounds: Normal heart sounds.   Pulmonary:      Effort: Pulmonary effort is normal.      Breath sounds: Normal breath sounds.   Abdominal:       General: Bowel sounds are normal.      Palpations: Abdomen is soft.   Musculoskeletal:         General: Normal range of motion.      Cervical back: Normal range of motion.      Right lower leg: Edema present.      Left lower leg: Edema present.   Skin:     General: Skin is warm and dry.   Neurological:      Mental Status: She is alert and oriented to person, place, and time.   Psychiatric:         Behavior: Behavior normal.         Thought Content: Thought content normal.         Judgment: Judgment normal.         Assessment:       1. Spinal stenosis, unspecified spinal region    2. Uses walker    3. Other chronic pain    4. History of seizures    5. Essential hypertension    6. Atrial fibrillation, unspecified type    7. Anticoagulated    8. S/P ORIF (open reduction internal fixation) fracture    9. Osteoporosis, unspecified osteoporosis type, unspecified pathological fracture presence    10. Iron deficiency anemia, unspecified iron deficiency anemia type    11. HFrEF (heart failure with reduced ejection fraction)    12. Depression, unspecified depression type    13. Menopause    14. Special screening for osteoporosis    15. Breast cancer screening by mammogram        Plan:       Spinal stenosis, unspecified spinal region    Uses walker    Other chronic pain    History of seizures    Essential hypertension  -     BNP; Future; Expected date: 06/30/2022    Atrial fibrillation, unspecified type    Anticoagulated    S/P ORIF (open reduction internal fixation) fracture    Osteoporosis, unspecified osteoporosis type, unspecified pathological fracture presence    Iron deficiency anemia, unspecified iron deficiency anemia type    HFrEF (heart failure with reduced ejection fraction)  -     BNP; Future; Expected date: 06/30/2022  -     TSH; Future; Expected date: 07/14/2022    Depression, unspecified depression type  -     TSH; Future; Expected date: 07/14/2022  -     TSH; Future; Expected date: 07/14/2022    Menopause  -      DXA Bone Density Spine And Hip; Future; Expected date: 06/30/2022    Special screening for osteoporosis  -     DXA Bone Density Spine And Hip; Future; Expected date: 06/30/2022    Breast cancer screening by mammogram  -     Mammo Digital Screening Bilat; Future; Expected date: 06/30/2022    Other orders  -     furosemide (LASIX) 40 MG tablet; Take 1 tablet (40 mg total) by mouth daily as needed (swelling).  Dispense: 90 tablet; Refill: 1  -     EScitalopram oxalate (LEXAPRO) 20 MG tablet; Take 1 tablet (20 mg total) by mouth once daily.  Dispense: 90 tablet; Refill: 1  -     ferrous sulfate 325 (65 FE) MG EC tablet; Take 1 tablet (325 mg total) by mouth once daily.  Dispense: 90 tablet; Refill: 1  -     potassium chloride (KLOR-CON) 10 MEQ TbSR; Take 1 tablet (10 mEq total) by mouth once daily.  Dispense: 90 tablet; Refill: 1    DEXA scan ordered.  Mammogram ordered.  Labs as ordered get a BNP and a TSH.  Lexapro 20 mg daily.  Lasix 40 daily.  Potassium 20 daily.  Follow-up here in 2 months.

## 2022-07-14 ENCOUNTER — HOSPITAL ENCOUNTER (OUTPATIENT)
Dept: RADIOLOGY | Facility: HOSPITAL | Age: 77
Discharge: HOME | End: 2022-07-14
Attending: FAMILY MEDICINE
Payer: MEDICARE

## 2022-07-14 DIAGNOSIS — Z12.31 BREAST CANCER SCREENING BY MAMMOGRAM: ICD-10-CM

## 2022-07-14 DIAGNOSIS — Z78.0 MENOPAUSE: ICD-10-CM

## 2022-07-14 DIAGNOSIS — Z13.820 SPECIAL SCREENING FOR OSTEOPOROSIS: ICD-10-CM

## 2022-07-14 PROCEDURE — 77080 DXA BONE DENSITY AXIAL: CPT | Mod: TC,PO

## 2022-07-14 PROCEDURE — 77063 BREAST TOMOSYNTHESIS BI: CPT | Mod: TC,PO

## 2022-07-14 PROCEDURE — 77067 SCR MAMMO BI INCL CAD: CPT | Mod: TC,PO

## 2022-07-18 RX ORDER — ALENDRONATE SODIUM 70 MG/1
70 TABLET ORAL
Qty: 13 TABLET | Refills: 3 | Status: SHIPPED | OUTPATIENT
Start: 2022-07-18 | End: 2023-12-06

## 2022-07-18 RX ORDER — ALENDRONATE SODIUM 70 MG/1
70 TABLET ORAL
COMMUNITY
End: 2022-07-18 | Stop reason: SDUPTHER

## 2022-08-08 DIAGNOSIS — M54.16 LUMBAR RADICULOPATHY: ICD-10-CM

## 2022-08-08 DIAGNOSIS — M51.36 DDD (DEGENERATIVE DISC DISEASE), LUMBAR: ICD-10-CM

## 2022-08-08 DIAGNOSIS — M96.1 FAILED BACK SYNDROME OF LUMBAR SPINE: ICD-10-CM

## 2022-08-08 DIAGNOSIS — M47.896 OTHER SPONDYLOSIS, LUMBAR REGION: ICD-10-CM

## 2022-08-08 RX ORDER — PREGABALIN 200 MG/1
CAPSULE ORAL
Qty: 90 CAPSULE | Refills: 2 | Status: SHIPPED | OUTPATIENT
Start: 2022-08-08 | End: 2022-10-14 | Stop reason: SDUPTHER

## 2022-08-08 NOTE — TELEPHONE ENCOUNTER
----- Message from Jerome Schroeder sent at 8/8/2022  9:41 AM CDT -----  Type:  RX Refill Request    Who Called: Patient  Refill or New Rx:  Refill  RX Name and Strength:  pregabalin (LYRICA) 200 MG Cap  How is the patient currently taking it? (ex. 1XDay):  1 capsule 3XDay  Is this a 30 day or 90 day RX: 90 capsules    Preferred Pharmacy with phone number:   [id number] - [place] - [address] AT [address]  [address]  [place] [address]  Phone: [phone] Fax: [phone]      Local or Mail Order:  Local  Ordering Provider: Chari Espinoza Call Back Number:  [phone]  Additional Information: Pt is OUT

## 2022-09-14 ENCOUNTER — OFFICE VISIT (OUTPATIENT)
Dept: FAMILY MEDICINE | Facility: CLINIC | Age: 77
End: 2022-09-14
Payer: MEDICARE

## 2022-09-14 VITALS
SYSTOLIC BLOOD PRESSURE: 130 MMHG | BODY MASS INDEX: 24.57 KG/M2 | RESPIRATION RATE: 18 BRPM | DIASTOLIC BLOOD PRESSURE: 70 MMHG | HEART RATE: 60 BPM | OXYGEN SATURATION: 97 % | HEIGHT: 66 IN | WEIGHT: 152.88 LBS | TEMPERATURE: 97 F

## 2022-09-14 DIAGNOSIS — L03.119 CELLULITIS OF LOWER EXTREMITY, UNSPECIFIED LATERALITY: Primary | ICD-10-CM

## 2022-09-14 DIAGNOSIS — I50.20 HFREF (HEART FAILURE WITH REDUCED EJECTION FRACTION): ICD-10-CM

## 2022-09-14 DIAGNOSIS — I48.91 ATRIAL FIBRILLATION, UNSPECIFIED TYPE: ICD-10-CM

## 2022-09-14 DIAGNOSIS — I10 ESSENTIAL HYPERTENSION: ICD-10-CM

## 2022-09-14 DIAGNOSIS — Z87.898 HISTORY OF SEIZURES: ICD-10-CM

## 2022-09-14 DIAGNOSIS — G89.29 OTHER CHRONIC PAIN: ICD-10-CM

## 2022-09-14 DIAGNOSIS — L30.9 DERMATITIS: ICD-10-CM

## 2022-09-14 DIAGNOSIS — Z79.01 ANTICOAGULATED: ICD-10-CM

## 2022-09-14 DIAGNOSIS — F32.A DEPRESSION, UNSPECIFIED DEPRESSION TYPE: ICD-10-CM

## 2022-09-14 PROCEDURE — 99214 OFFICE O/P EST MOD 30 MIN: CPT | Mod: S$GLB,,,

## 2022-09-14 PROCEDURE — 3008F BODY MASS INDEX DOCD: CPT | Mod: CPTII,S$GLB,,

## 2022-09-14 PROCEDURE — 4010F PR ACE/ARB THEARPY RXD/TAKEN: ICD-10-PCS | Mod: CPTII,S$GLB,,

## 2022-09-14 PROCEDURE — 1125F PR PAIN SEVERITY QUANTIFIED, PAIN PRESENT: ICD-10-PCS | Mod: CPTII,S$GLB,,

## 2022-09-14 PROCEDURE — 1101F PR PT FALLS ASSESS DOC 0-1 FALLS W/OUT INJ PAST YR: ICD-10-PCS | Mod: CPTII,S$GLB,,

## 2022-09-14 PROCEDURE — 3078F PR MOST RECENT DIASTOLIC BLOOD PRESSURE < 80 MM HG: ICD-10-PCS | Mod: CPTII,S$GLB,,

## 2022-09-14 PROCEDURE — 3078F DIAST BP <80 MM HG: CPT | Mod: CPTII,S$GLB,,

## 2022-09-14 PROCEDURE — 3075F PR MOST RECENT SYSTOLIC BLOOD PRESS GE 130-139MM HG: ICD-10-PCS | Mod: CPTII,S$GLB,,

## 2022-09-14 PROCEDURE — 1101F PT FALLS ASSESS-DOCD LE1/YR: CPT | Mod: CPTII,S$GLB,,

## 2022-09-14 PROCEDURE — 1159F MED LIST DOCD IN RCRD: CPT | Mod: CPTII,S$GLB,,

## 2022-09-14 PROCEDURE — 3008F PR BODY MASS INDEX (BMI) DOCUMENTED: ICD-10-PCS | Mod: CPTII,S$GLB,,

## 2022-09-14 PROCEDURE — 3075F SYST BP GE 130 - 139MM HG: CPT | Mod: CPTII,S$GLB,,

## 2022-09-14 PROCEDURE — 4010F ACE/ARB THERAPY RXD/TAKEN: CPT | Mod: CPTII,S$GLB,,

## 2022-09-14 PROCEDURE — 1159F PR MEDICATION LIST DOCUMENTED IN MEDICAL RECORD: ICD-10-PCS | Mod: CPTII,S$GLB,,

## 2022-09-14 PROCEDURE — 99214 PR OFFICE/OUTPT VISIT, EST, LEVL IV, 30-39 MIN: ICD-10-PCS | Mod: S$GLB,,,

## 2022-09-14 PROCEDURE — 3288F FALL RISK ASSESSMENT DOCD: CPT | Mod: CPTII,S$GLB,,

## 2022-09-14 PROCEDURE — 1125F AMNT PAIN NOTED PAIN PRSNT: CPT | Mod: CPTII,S$GLB,,

## 2022-09-14 PROCEDURE — 3288F PR FALLS RISK ASSESSMENT DOCUMENTED: ICD-10-PCS | Mod: CPTII,S$GLB,,

## 2022-09-14 RX ORDER — SULFAMETHOXAZOLE AND TRIMETHOPRIM 800; 160 MG/1; MG/1
1 TABLET ORAL 2 TIMES DAILY
Qty: 14 TABLET | Refills: 0 | Status: SHIPPED | OUTPATIENT
Start: 2022-09-14 | End: 2022-09-21

## 2022-09-14 RX ORDER — TRIAMCINOLONE ACETONIDE 1 MG/G
OINTMENT TOPICAL 2 TIMES DAILY
Qty: 30 G | Refills: 0 | Status: SHIPPED | OUTPATIENT
Start: 2022-09-14 | End: 2023-05-16

## 2022-09-15 ENCOUNTER — LAB VISIT (OUTPATIENT)
Dept: LAB | Facility: HOSPITAL | Age: 77
End: 2022-09-15
Attending: FAMILY MEDICINE
Payer: MEDICARE

## 2022-09-15 DIAGNOSIS — F32.A DEPRESSION, UNSPECIFIED DEPRESSION TYPE: ICD-10-CM

## 2022-09-15 DIAGNOSIS — I50.20 HFREF (HEART FAILURE WITH REDUCED EJECTION FRACTION): ICD-10-CM

## 2022-09-15 LAB
BNP SERPL-MCNC: 281 PG/ML (ref 0–99)
TSH SERPL DL<=0.005 MIU/L-ACNC: 1.06 UIU/ML (ref 0.34–5.6)
TSH SERPL DL<=0.005 MIU/L-ACNC: 1.06 UIU/ML (ref 0.34–5.6)

## 2022-09-15 PROCEDURE — 83880 ASSAY OF NATRIURETIC PEPTIDE: CPT | Performed by: FAMILY MEDICINE

## 2022-09-15 PROCEDURE — 84443 ASSAY THYROID STIM HORMONE: CPT | Performed by: FAMILY MEDICINE

## 2022-09-15 NOTE — PROGRESS NOTES
Subjective:       Patient ID: Froilan Ray is a 76 y.o. female.    Chief Complaint: Follow-up (2 month)    Patient presents to clinic for follow up and complaints of bilateral leg swelling.  She states her legs began to swell recently.  She is taking lasix which is helping. She also states her legs are red.  She states that her legs feel hot and swollen.  She denies pain.  She denies chest pain or shortness of breath.  She denies any soap or skin product changes.      Past medical history.  Chronic back pain.  Sees Pain Management.  Dr. Marcelino.  Three back surgeries. States last surgeries made her worse.  Injections have not helped.  Uses cane or walker.  History of seizures off medication for years.  Hypertension which is controlled.  Atrial fibrillation.  Takes amiodarone.  Anticoagulated with apixaban.  Hospitalized for bowel obstruction due to impaction.  Status post ORIF of the left hip.  Also has osteoporosis, takes Fosamax.  Has iron deficiency anemia with the hemoglobin of 9 MCV currently 95.  Status post complete hysterectomy.  Has heart failure reduced ejection fraction most recent EF 50%.  Depression, controlled, denies thoughts of suicide or homicide.    Review of patient's allergies indicates:  No Known Allergies  Social Determinants of Health     Tobacco Use: Low Risk     Smoking Tobacco Use: Never    Smokeless Tobacco Use: Never   Alcohol Use: Not on file   Financial Resource Strain: Not on file   Food Insecurity: Not on file   Transportation Needs: Not on file   Physical Activity: Not on file   Stress: Not on file   Social Connections: Not on file   Housing Stability: Not on file   Depression PHQ-4: Medium Risk    Last PHQ Score: 5      Past Medical History:   Diagnosis Date    Anemia     Arthritis     Bleeding ulcer 07/2016    Chronic pain     DDD (degenerative disc disease), cervical     DDD (degenerative disc disease), lumbar     Depression     Disc degeneration, lumbosacral     Diverticulitis      Encounter for blood transfusion     Hypertension     IBS (irritable bowel syndrome)     Neuropathy of both feet     Seizures     none  since 2017    Umbilical hernia 2020      Past Surgical History:   Procedure Laterality Date    BACK SURGERY      BREAST BIOPSY      BREAST SURGERY Right     lumpectomy    CAUDAL EPIDURAL STEROID INJECTION N/A 01/28/2022    Procedure: Injection-steroid-epidural-caudal;  Surgeon: Luzmaria Marcelino MD;  [facility];  Service: Pain Management;  Laterality: N/A;    COLONOSCOPY N/A 01/14/2022    Procedure: COLONOSCOPY;  Surgeon: Abby Landers MD;  Location: [facility] ENDO;  Service: Endoscopy;  Laterality: N/A;    ENDOSCOPIC ULTRASOUND OF UPPER GASTROINTESTINAL TRACT N/A 07/21/2020    Procedure: ULTRASOUND, UPPER GI TRACT, ENDOSCOPIC;  Surgeon: Marcio Nguyễn III, MD;  Location: [facility] ENDO;  Service: Endoscopy;  Laterality: N/A;    ESOPHAGOGASTRODUODENOSCOPY N/A 01/14/2022    Procedure: EGD (ESOPHAGOGASTRODUODENOSCOPY);  Surgeon: Abby Landers MD;  Location: [facility];  Service: Endoscopy;  Laterality: N/A;    ESOPHAGOGASTRODUODENOSCOPY N/A 06/10/2022    Procedure: EGD (ESOPHAGOGASTRODUODENOSCOPY);  Surgeon: Abby Landers MD;  Location: [facility] ENDO;  Service: Endoscopy;  Laterality: N/A;    EYE SURGERY      cataract    HYSTERECTOMY      INTRAMEDULLARY RODDING OF TROCHANTER OF FEMUR Left 12/11/2018    Procedure: INSERTION, INTRAMEDULLARY SANTOS, FEMUR, TROCHANTER;  Surgeon: Eulalio De La Cruz MD;  Location: [facility];  Service: Orthopedics;  Laterality: Left;    SPINAL CORD STIMULATOR IMPLANT  09/18/2013    and removal    SPINE SURGERY  2006    lumbar L2-S1 decompression.    SPINE SURGERY      cervical decompression    TONSILLECTOMY        Social History     Socioeconomic History    Marital status:          Current Outpatient Medications:     acetaminophen (TYLENOL) 325 MG tablet, Take 650 mg by mouth once., Disp: , Rfl:     alendronate (FOSAMAX) 70 MG tablet, Take 1  tablet (70 mg total) by mouth every 7 days., Disp: 13 tablet, Rfl: 3    amiodarone (PACERONE) 200 MG Tab, Take 1 tablet (200 mg total) by mouth once daily., Disp: 30 tablet, Rfl: 1    amlodipine-benazepril 5-20 mg (LOTREL) 5-20 mg per capsule, Take 1 capsule by mouth once daily., Disp: 90 capsule, Rfl: 3    apixaban (ELIQUIS) 2.5 mg Tab, Take 1 tablet (2.5 mg total) by mouth 2 (two) times daily., Disp: 60 tablet, Rfl: 1    ascorbic acid, vitamin C, (VITAMIN C) 250 MG tablet, Take 1 tablet (250 mg total) by mouth once daily., Disp: 30 tablet, Rfl: 0    cyanocobalamin (VITAMIN B-12) 100 MCG tablet, Take 1 tablet (100 mcg total) by mouth once daily., Disp: 30 tablet, Rfl: 0    cyclobenzaprine (FLEXERIL) 10 MG tablet, TAKE 1 TABLET(10 MG) BY MOUTH THREE TIMES DAILY AS NEEDED FOR MUSCLE SPASMS, Disp: 90 tablet, Rfl: 2    docusate sodium (COLACE) 100 MG capsule, Take 1 capsule (100 mg total) by mouth 2 (two) times daily., Disp: 60 capsule, Rfl: 1    EScitalopram oxalate (LEXAPRO) 20 MG tablet, Take 1 tablet (20 mg total) by mouth once daily., Disp: 90 tablet, Rfl: 1    ferrous sulfate 325 (65 FE) MG EC tablet, Take 1 tablet (325 mg total) by mouth once daily., Disp: 90 tablet, Rfl: 1    furosemide (LASIX) 40 MG tablet, Take 1 tablet (40 mg total) by mouth daily as needed (swelling)., Disp: 90 tablet, Rfl: 1    metoprolol tartrate (LOPRESSOR) 50 MG tablet, Take 1 tablet (50 mg total) by mouth 3 (three) times daily., Disp: 90 tablet, Rfl: 1    omeprazole (PRILOSEC) 40 MG capsule, Take 1 capsule (40 mg total) by mouth every morning., Disp: 90 capsule, Rfl: 2    oxyCODONE-acetaminophen (PERCOCET)  mg per tablet, Take one tablet by mouth every 4-6 hours as needed for pain, Disp: 140 tablet, Rfl: 0    oxyCODONE-acetaminophen (PERCOCET)  mg per tablet, Take one tablet by mouth every 4 to 6 hours as needed for pain, Disp: 140 tablet, Rfl: 0    oxyCODONE-acetaminophen (PERCOCET)  mg per tablet, Take one tablet by  mouth every 4 to 6 hours as needed for pain, Disp: 140 tablet, Rfl: 0    polyethylene glycol (GLYCOLAX) 17 gram PwPk, Take 17 g by mouth 2 (two) times daily., Disp: 60 packet, Rfl: 1    potassium chloride (KLOR-CON) 10 MEQ TbSR, Take 1 tablet (10 mEq total) by mouth once daily., Disp: 90 tablet, Rfl: 1    pregabalin (LYRICA) 200 MG Cap, TAKE 1 CAPSULE(200 MG) BY MOUTH THREE TIMES DAILY, Disp: 90 capsule, Rfl: 2    sulfamethoxazole-trimethoprim 800-160mg (BACTRIM DS) 800-160 mg Tab, Take 1 tablet by mouth 2 (two) times daily. for 7 days, Disp: 14 tablet, Rfl: 0    triamcinolone acetonide 0.1% (KENALOG) 0.1 % ointment, Apply topically 2 (two) times daily., Disp: 30 g, Rfl: 0    Lab Results   Component Value Date    WBC 10.34 06/27/2022    HGB 9.0 (L) 06/27/2022    HCT 29.6 (L) 06/27/2022     (H) 06/27/2022    ALT 12 06/20/2022    AST 16 06/20/2022     06/20/2022    K 3.6 06/20/2022     06/20/2022    CREATININE 0.6 06/20/2022    BUN 4 (L) 06/20/2022    CO2 20 (L) 06/20/2022    TSH 0.691 05/11/2022    INR 1.2 06/16/2022       Review of Systems   Constitutional:  Negative for chills and fever.   Respiratory:  Negative for cough, chest tightness and shortness of breath.    Cardiovascular:  Positive for leg swelling. Negative for chest pain and palpitations.   Integumentary:  Positive for rash.     Objective:      Physical Exam  Vitals reviewed.   Constitutional:       Appearance: Normal appearance.   Cardiovascular:      Rate and Rhythm: Normal rate and regular rhythm.      Pulses: Normal pulses.           Radial pulses are 2+ on the right side and 2+ on the left side.        Posterior tibial pulses are 2+ on the right side and 2+ on the left side.      Heart sounds: Normal heart sounds, S1 normal and S2 normal.   Pulmonary:      Effort: Pulmonary effort is normal.      Breath sounds: Normal breath sounds.   Musculoskeletal:      Right lower leg: Swelling present. 1+ Edema present.      Left lower leg:  Swelling present. 1+ Edema present.   Skin:     General: Skin is warm.      Capillary Refill: Capillary refill takes less than 2 seconds.      Findings: Erythema and rash present. Rash is macular.      Comments: Macular rash to bilateral lower extremities.  Both lower extremities are hot to touch, redness and swelling from ankles to mid calf.    Neurological:      Mental Status: She is alert.       Assessment:       1. Cellulitis of lower extremity, unspecified laterality    2. Dermatitis    3. Essential hypertension    4. Other chronic pain    5. History of seizures    6. Depression, unspecified depression type    7. Atrial fibrillation, unspecified type    8. HFrEF (heart failure with reduced ejection fraction)    9. Anticoagulated          Plan:       Froilan was seen today for follow-up.    Diagnoses and all orders for this visit:    Cellulitis of lower extremity, unspecified laterality  -     sulfamethoxazole-trimethoprim 800-160mg (BACTRIM DS) 800-160 mg Tab; Take 1 tablet by mouth 2 (two) times daily. for 7 days  -     CBC Auto Differential; Future    Dermatitis  -     triamcinolone acetonide 0.1% (KENALOG) 0.1 % ointment; Apply topically 2 (two) times daily.    Essential hypertension  -     CBC Auto Differential; Future  -     Comprehensive Metabolic Panel; Future    Other chronic pain    History of seizures    Depression, unspecified depression type    Atrial fibrillation, unspecified type    HFrEF (heart failure with reduced ejection fraction)    Anticoagulated  -     CBC Auto Differential; Future       Patient to have labs done.  TSH and BNP ordered at last visit.  Hypertension controlled, continue current medications  Cellulitis: Bactrim DS as prescribed  Dermatitis: Kenalog cream as prescribed.  She does not need medication refills at this time.  Return to clinic in 2 weeks

## 2022-09-28 ENCOUNTER — TELEPHONE (OUTPATIENT)
Dept: PAIN MEDICINE | Facility: CLINIC | Age: 77
End: 2022-09-28
Payer: MEDICARE

## 2022-09-28 DIAGNOSIS — M54.16 LUMBAR RADICULOPATHY: ICD-10-CM

## 2022-09-28 DIAGNOSIS — M47.896 OTHER SPONDYLOSIS, LUMBAR REGION: ICD-10-CM

## 2022-09-28 DIAGNOSIS — M51.36 DDD (DEGENERATIVE DISC DISEASE), LUMBAR: ICD-10-CM

## 2022-09-28 DIAGNOSIS — M96.1 FAILED BACK SYNDROME OF LUMBAR SPINE: Primary | ICD-10-CM

## 2022-09-28 RX ORDER — OXYCODONE AND ACETAMINOPHEN 10; 325 MG/1; MG/1
TABLET ORAL
Qty: 140 TABLET | Refills: 0 | Status: SHIPPED | OUTPATIENT
Start: 2022-09-28 | End: 2022-10-14 | Stop reason: SDUPTHER

## 2022-09-28 NOTE — TELEPHONE ENCOUNTER
----- Message from Jane Bernard sent at 9/28/2022  9:10 AM CDT -----  Contact: Patient  Type:  RX Refill Request    Who Called: Patient     Refill or New Rx: refill     RX Name and Strength: oxyCODONE-acetaminophen (PERCOCET)  mg per tablet    How is the patient currently taking it? (ex. 1XDay): once every 4 to 6 hours     Is this a 30 day or 90 day RX: 30    Preferred Pharmacy with phone number:        [id number] - [place], [place] - [address] AT [address] [facility] [address]  [address]  [place] [address]  Phone: [phone] Fax: [phone]       Local or Mail Order: local     Ordering Provider: Luzmaria Marcelino     Would the patient rather a call back or a response via [facility]? Call     Best Call Back Number:[phone] (home)      Additional Information:     Patient stated that she is completely out and in pain.

## 2022-09-28 NOTE — TELEPHONE ENCOUNTER
Spoke with pharmacy and she states they ran it once and it needed PA she ran it again and medication went through and it is 1.08 cents I will inform patient.

## 2022-09-28 NOTE — TELEPHONE ENCOUNTER
----- Message from Malcom Coyne sent at 9/28/2022 10:47 AM CDT -----  Contact: pt @ [phone]  Type: Needs Medical Advice  Who Called: PT   Best Call Back Number: [phone]   Additional Information: Pt can not afford new price of pain meds, pharmacy requested an auth but price still hasn't changed. Please call pt to advise.

## 2022-10-04 ENCOUNTER — OFFICE VISIT (OUTPATIENT)
Dept: FAMILY MEDICINE | Facility: CLINIC | Age: 77
End: 2022-10-04
Payer: MEDICARE

## 2022-10-04 VITALS
WEIGHT: 143.88 LBS | HEART RATE: 71 BPM | DIASTOLIC BLOOD PRESSURE: 68 MMHG | BODY MASS INDEX: 23.12 KG/M2 | OXYGEN SATURATION: 96 % | HEIGHT: 66 IN | TEMPERATURE: 97 F | SYSTOLIC BLOOD PRESSURE: 126 MMHG

## 2022-10-04 DIAGNOSIS — F32.A DEPRESSION, UNSPECIFIED DEPRESSION TYPE: ICD-10-CM

## 2022-10-04 DIAGNOSIS — L03.115 CELLULITIS OF BOTH LOWER EXTREMITIES: ICD-10-CM

## 2022-10-04 DIAGNOSIS — I10 ESSENTIAL HYPERTENSION: ICD-10-CM

## 2022-10-04 DIAGNOSIS — L03.116 CELLULITIS OF BOTH LOWER EXTREMITIES: ICD-10-CM

## 2022-10-04 DIAGNOSIS — R19.7 DIARRHEA, UNSPECIFIED TYPE: Primary | ICD-10-CM

## 2022-10-04 DIAGNOSIS — I48.91 ATRIAL FIBRILLATION, UNSPECIFIED TYPE: ICD-10-CM

## 2022-10-04 DIAGNOSIS — I50.20 HFREF (HEART FAILURE WITH REDUCED EJECTION FRACTION): ICD-10-CM

## 2022-10-04 DIAGNOSIS — Z79.01 ANTICOAGULATED: ICD-10-CM

## 2022-10-04 PROCEDURE — 1159F PR MEDICATION LIST DOCUMENTED IN MEDICAL RECORD: ICD-10-PCS | Mod: CPTII,S$GLB,, | Performed by: FAMILY MEDICINE

## 2022-10-04 PROCEDURE — 99214 PR OFFICE/OUTPT VISIT, EST, LEVL IV, 30-39 MIN: ICD-10-PCS | Mod: S$GLB,,, | Performed by: FAMILY MEDICINE

## 2022-10-04 PROCEDURE — 1160F RVW MEDS BY RX/DR IN RCRD: CPT | Mod: CPTII,S$GLB,, | Performed by: FAMILY MEDICINE

## 2022-10-04 PROCEDURE — 3288F PR FALLS RISK ASSESSMENT DOCUMENTED: ICD-10-PCS | Mod: CPTII,S$GLB,, | Performed by: FAMILY MEDICINE

## 2022-10-04 PROCEDURE — 3074F SYST BP LT 130 MM HG: CPT | Mod: CPTII,S$GLB,, | Performed by: FAMILY MEDICINE

## 2022-10-04 PROCEDURE — 3288F FALL RISK ASSESSMENT DOCD: CPT | Mod: CPTII,S$GLB,, | Performed by: FAMILY MEDICINE

## 2022-10-04 PROCEDURE — 1160F PR REVIEW ALL MEDS BY PRESCRIBER/CLIN PHARMACIST DOCUMENTED: ICD-10-PCS | Mod: CPTII,S$GLB,, | Performed by: FAMILY MEDICINE

## 2022-10-04 PROCEDURE — 1125F AMNT PAIN NOTED PAIN PRSNT: CPT | Mod: CPTII,S$GLB,, | Performed by: FAMILY MEDICINE

## 2022-10-04 PROCEDURE — 1101F PT FALLS ASSESS-DOCD LE1/YR: CPT | Mod: CPTII,S$GLB,, | Performed by: FAMILY MEDICINE

## 2022-10-04 PROCEDURE — 1125F PR PAIN SEVERITY QUANTIFIED, PAIN PRESENT: ICD-10-PCS | Mod: CPTII,S$GLB,, | Performed by: FAMILY MEDICINE

## 2022-10-04 PROCEDURE — 3078F PR MOST RECENT DIASTOLIC BLOOD PRESSURE < 80 MM HG: ICD-10-PCS | Mod: CPTII,S$GLB,, | Performed by: FAMILY MEDICINE

## 2022-10-04 PROCEDURE — 3078F DIAST BP <80 MM HG: CPT | Mod: CPTII,S$GLB,, | Performed by: FAMILY MEDICINE

## 2022-10-04 PROCEDURE — 3008F BODY MASS INDEX DOCD: CPT | Mod: CPTII,S$GLB,, | Performed by: FAMILY MEDICINE

## 2022-10-04 PROCEDURE — 4010F ACE/ARB THERAPY RXD/TAKEN: CPT | Mod: CPTII,S$GLB,, | Performed by: FAMILY MEDICINE

## 2022-10-04 PROCEDURE — 3074F PR MOST RECENT SYSTOLIC BLOOD PRESSURE < 130 MM HG: ICD-10-PCS | Mod: CPTII,S$GLB,, | Performed by: FAMILY MEDICINE

## 2022-10-04 PROCEDURE — 1101F PR PT FALLS ASSESS DOC 0-1 FALLS W/OUT INJ PAST YR: ICD-10-PCS | Mod: CPTII,S$GLB,, | Performed by: FAMILY MEDICINE

## 2022-10-04 PROCEDURE — 3008F PR BODY MASS INDEX (BMI) DOCUMENTED: ICD-10-PCS | Mod: CPTII,S$GLB,, | Performed by: FAMILY MEDICINE

## 2022-10-04 PROCEDURE — 1159F MED LIST DOCD IN RCRD: CPT | Mod: CPTII,S$GLB,, | Performed by: FAMILY MEDICINE

## 2022-10-04 PROCEDURE — 4010F PR ACE/ARB THEARPY RXD/TAKEN: ICD-10-PCS | Mod: CPTII,S$GLB,, | Performed by: FAMILY MEDICINE

## 2022-10-04 PROCEDURE — 99214 OFFICE O/P EST MOD 30 MIN: CPT | Mod: S$GLB,,, | Performed by: FAMILY MEDICINE

## 2022-10-04 RX ORDER — ESCITALOPRAM OXALATE 20 MG/1
20 TABLET ORAL DAILY
Qty: 90 TABLET | Refills: 1 | Status: SHIPPED | OUTPATIENT
Start: 2022-10-04 | End: 2023-11-20

## 2022-10-04 RX ORDER — ESCITALOPRAM OXALATE 20 MG/1
20 TABLET ORAL DAILY
Qty: 90 TABLET | Refills: 1 | Status: SHIPPED | OUTPATIENT
Start: 2022-10-04 | End: 2022-11-30

## 2022-10-05 NOTE — PROGRESS NOTES
Subjective:       Patient ID: Froilan Ray is a 76 y.o. female.    Chief Complaint: Follow-up    Follow-up of cellulitis of both lower legs.  Doing better.  Completed antibiotics.  Does have diarrhea few times per day.  Hypertension is controlled.  Heart failure reduced ejection fraction about 50%.  Depression out of her Lexapro.  We gave her 90 days with a refill but her pharmacy says she has none.  Also has atrial fibrillation and is anticoagulated.  Her ferritin was 56 iron 46 TIBC 274.  BNP elevated at 281.  CBC hemoglobin 11.5 slightly go MCV 95.  Glucose 117 GFR 59 TSH 1.060.      Physical examination.  Vital signs noted.  No acute distress.  Chest clear.  Heart regular rate rhythm.  Abdomen bowel sounds are positive ventral hernia nontender easily reducible.  Extremities there is trace pedal edema.  There is no erythema now no increased warmth.        Objective:        Assessment:       1. Diarrhea, unspecified type    2. Cellulitis of both lower extremities    3. Essential hypertension    4. HFrEF (heart failure with reduced ejection fraction)    5. Depression, unspecified depression type    6. Atrial fibrillation, unspecified type    7. Anticoagulated          Plan:       Diarrhea, unspecified type  -     CLOSTRIDIUM DIFFICILE; Future; Expected date: 10/04/2022    Cellulitis of both lower extremities    Essential hypertension    HFrEF (heart failure with reduced ejection fraction)    Depression, unspecified depression type    Atrial fibrillation, unspecified type    Anticoagulated    Other orders  -     EScitalopram oxalate (LEXAPRO) 20 MG tablet; Take 1 tablet (20 mg total) by mouth once daily.  Dispense: 90 tablet; Refill: 1  -     EScitalopram oxalate (LEXAPRO) 20 MG tablet; Take 1 tablet (20 mg total) by mouth once daily.  Dispense: 90 tablet; Refill: 1      Cellulitis is much improved.  Refill her Lexapro for her depression.  Ninety day supply with a refill again.  Continue her Lasix 40 daily and  potassium chloride 10 daily.  Follow-up in 3 months.  Check a stool for Clostridium difficile due to the diarrhea.

## 2022-10-14 ENCOUNTER — OFFICE VISIT (OUTPATIENT)
Dept: PAIN MEDICINE | Facility: CLINIC | Age: 77
End: 2022-10-14
Payer: MEDICARE

## 2022-10-14 VITALS
WEIGHT: 143 LBS | HEIGHT: 66 IN | SYSTOLIC BLOOD PRESSURE: 153 MMHG | DIASTOLIC BLOOD PRESSURE: 79 MMHG | HEART RATE: 60 BPM | BODY MASS INDEX: 22.98 KG/M2

## 2022-10-14 DIAGNOSIS — M47.896 OTHER SPONDYLOSIS, LUMBAR REGION: ICD-10-CM

## 2022-10-14 DIAGNOSIS — M51.36 DDD (DEGENERATIVE DISC DISEASE), LUMBAR: ICD-10-CM

## 2022-10-14 DIAGNOSIS — M54.16 LUMBAR RADICULOPATHY: ICD-10-CM

## 2022-10-14 DIAGNOSIS — F11.90 CHRONIC, CONTINUOUS USE OF OPIOIDS: Primary | ICD-10-CM

## 2022-10-14 DIAGNOSIS — M96.1 FAILED BACK SYNDROME OF LUMBAR SPINE: Primary | ICD-10-CM

## 2022-10-14 DIAGNOSIS — M96.1 FAILED BACK SYNDROME OF LUMBAR SPINE: ICD-10-CM

## 2022-10-14 PROCEDURE — 1159F PR MEDICATION LIST DOCUMENTED IN MEDICAL RECORD: ICD-10-PCS | Mod: CPTII,S$GLB,, | Performed by: PHYSICIAN ASSISTANT

## 2022-10-14 PROCEDURE — 3077F PR MOST RECENT SYSTOLIC BLOOD PRESSURE >= 140 MM HG: ICD-10-PCS | Mod: CPTII,S$GLB,, | Performed by: PHYSICIAN ASSISTANT

## 2022-10-14 PROCEDURE — 1159F MED LIST DOCD IN RCRD: CPT | Mod: CPTII,S$GLB,, | Performed by: PHYSICIAN ASSISTANT

## 2022-10-14 PROCEDURE — 99214 OFFICE O/P EST MOD 30 MIN: CPT | Mod: S$GLB,,, | Performed by: PHYSICIAN ASSISTANT

## 2022-10-14 PROCEDURE — 1125F PR PAIN SEVERITY QUANTIFIED, PAIN PRESENT: ICD-10-PCS | Mod: CPTII,S$GLB,, | Performed by: PHYSICIAN ASSISTANT

## 2022-10-14 PROCEDURE — 3077F SYST BP >= 140 MM HG: CPT | Mod: CPTII,S$GLB,, | Performed by: PHYSICIAN ASSISTANT

## 2022-10-14 PROCEDURE — 1101F PR PT FALLS ASSESS DOC 0-1 FALLS W/OUT INJ PAST YR: ICD-10-PCS | Mod: CPTII,S$GLB,, | Performed by: PHYSICIAN ASSISTANT

## 2022-10-14 PROCEDURE — 99999 PR PBB SHADOW E&M-EST. PATIENT-LVL IV: ICD-10-PCS | Mod: PBBFAC,,, | Performed by: PHYSICIAN ASSISTANT

## 2022-10-14 PROCEDURE — 80326 AMPHETAMINES 5 OR MORE: CPT | Performed by: PHYSICIAN ASSISTANT

## 2022-10-14 PROCEDURE — 99214 PR OFFICE/OUTPT VISIT, EST, LEVL IV, 30-39 MIN: ICD-10-PCS | Mod: S$GLB,,, | Performed by: PHYSICIAN ASSISTANT

## 2022-10-14 PROCEDURE — 3288F PR FALLS RISK ASSESSMENT DOCUMENTED: ICD-10-PCS | Mod: CPTII,S$GLB,, | Performed by: PHYSICIAN ASSISTANT

## 2022-10-14 PROCEDURE — 3078F PR MOST RECENT DIASTOLIC BLOOD PRESSURE < 80 MM HG: ICD-10-PCS | Mod: CPTII,S$GLB,, | Performed by: PHYSICIAN ASSISTANT

## 2022-10-14 PROCEDURE — 3288F FALL RISK ASSESSMENT DOCD: CPT | Mod: CPTII,S$GLB,, | Performed by: PHYSICIAN ASSISTANT

## 2022-10-14 PROCEDURE — 4010F PR ACE/ARB THEARPY RXD/TAKEN: ICD-10-PCS | Mod: CPTII,S$GLB,, | Performed by: PHYSICIAN ASSISTANT

## 2022-10-14 PROCEDURE — 4010F ACE/ARB THERAPY RXD/TAKEN: CPT | Mod: CPTII,S$GLB,, | Performed by: PHYSICIAN ASSISTANT

## 2022-10-14 PROCEDURE — 1125F AMNT PAIN NOTED PAIN PRSNT: CPT | Mod: CPTII,S$GLB,, | Performed by: PHYSICIAN ASSISTANT

## 2022-10-14 PROCEDURE — 1101F PT FALLS ASSESS-DOCD LE1/YR: CPT | Mod: CPTII,S$GLB,, | Performed by: PHYSICIAN ASSISTANT

## 2022-10-14 PROCEDURE — 3078F DIAST BP <80 MM HG: CPT | Mod: CPTII,S$GLB,, | Performed by: PHYSICIAN ASSISTANT

## 2022-10-14 PROCEDURE — 99999 PR PBB SHADOW E&M-EST. PATIENT-LVL IV: CPT | Mod: PBBFAC,,, | Performed by: PHYSICIAN ASSISTANT

## 2022-10-14 RX ORDER — OXYCODONE AND ACETAMINOPHEN 10; 325 MG/1; MG/1
TABLET ORAL
Qty: 140 TABLET | Refills: 0 | Status: SHIPPED | OUTPATIENT
Start: 2022-12-24 | End: 2022-10-26

## 2022-10-14 RX ORDER — OXYCODONE AND ACETAMINOPHEN 10; 325 MG/1; MG/1
TABLET ORAL
Qty: 140 TABLET | Refills: 0 | Status: SHIPPED | OUTPATIENT
Start: 2022-11-25 | End: 2022-11-30

## 2022-10-14 RX ORDER — OXYCODONE AND ACETAMINOPHEN 10; 325 MG/1; MG/1
TABLET ORAL
Qty: 140 TABLET | Refills: 0 | Status: ON HOLD | OUTPATIENT
Start: 2022-10-27 | End: 2023-05-17 | Stop reason: SDUPTHER

## 2022-10-14 RX ORDER — PREGABALIN 200 MG/1
CAPSULE ORAL
Qty: 90 CAPSULE | Refills: 2 | Status: ON HOLD | OUTPATIENT
Start: 2022-10-14 | End: 2024-01-27

## 2022-10-14 NOTE — PROGRESS NOTES
"FOLLOW UP NOTE:     CHIEF COMPLAINT: back pain    INITIAL HISTORY OF PRESENT ILLNESS: Froilan Ray is a 75 y.o. female with PMH significant for hx of SCS implantation (Dr. Noel; 2013) with subsequent explantation, hx of ORIF of left intertrochanteric fracture (Dr. De La Cruz; 2018), hx of seizure disorder (well controlled), and HTN presents for the evaluation of low back pain. 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 The patient reports that she saw Dr. Parr in the past. The patient reports that she has been treated by Dr. Bhatti for the past 5 years. The patient presents to re-establish care with a new pain management provider today. In regards to her pain, the patient localizes her pain to the area across her lower back. The patient reports of radiation down her BLE's to her feet. The patient describes her pain as a burning type of pain. The patient reports of numbness in her legs. The patient reports that her pain is a 10/10 at its worst. Patient denies of any urinary/fecal incontinence, saddle anesthesia. The patient reports of subjective weakness in her legs.      Aggravating factors: bending, lifting items     Mitigating factors: lyrica (benefit in regards to burning leg pain), pain medication     Relevant Surgeries: yes; cervical spine X 1 and lumbar spine surgery X 1     Interventional Therapies: yes; prior SCS implantation and removal. Last saw Dr. Bhatti over the last 5 years. Patient reports that she was "dismissed" from Dr. Bhatti's practice. She reports that she was dismissed secondary to being unable to present for a scheduled UDS. One injection in her back and knee injections - reports of some benefit in regards to her knee pain. Denies of prior RFA.  History above per Dr. Marcelino. Patient new to me  INTERVAL HISTORY OF PRESENT ILLNESS: Froilan Ray is a 76 y.o. female with PMH " significant for hx of SCS implantation (Dr. Noel; 2013) with subsequent explantation, hx of ORIF of left intertrochanteric fracture (Dr. De La Cruz; 2018), hx of seizure disorder (well controlled), and HTN presents as an established patient for the continued management of back pain. The patient she is  on eliquis. In regards to her chronic pain, the patient continues to localize her pain to the area across her lower back with radiation down her BLE's to her feet. She reports that her pain is worsened with any physical activity in general. She reports that her pain is improved with her pain regimen as outlined below. The patient denies of any significant changes in her health since her last appointment. The patient also denies of any changes in the character of her pain since her last appointment. The patient reports that her current pain is a 4/10. Patient denies of any urinary/fecal incontinence, saddle anesthesia, or new weakness.      The patient presents today for a medication refill.     INTERVENTIONAL PAIN HISTORY:  1/28/2022: Caudal epidural steroid injection - no relief  11/19/2021: Bilateral knee injections      CURRENT PAIN MEDICATIONS:   Lyrica 200 mg PO TID  OTC aleve  flexeril 10 mg PO TID  Percocet  mg PO QID        ROS:  Review of Systems   Constitutional:  Negative for chills and fever.   HENT:  Negative for sore throat.    Eyes:  Negative for visual disturbance.   Respiratory:  Negative for shortness of breath.    Cardiovascular:  Negative for chest pain.   Gastrointestinal:  Negative for nausea and vomiting.   Genitourinary:  Negative for difficulty urinating.   Musculoskeletal:  Positive for back pain.   Skin:  Negative for rash.   Allergic/Immunologic: Negative for immunocompromised state.   Neurological:  Positive for numbness. Negative for syncope.   Hematological:  Does not bruise/bleed easily.   Psychiatric/Behavioral:  Negative for suicidal ideas.       MEDICAL, SURGICAL, FAMILY,  "SOCIAL HX: reviewed    MEDICATIONS/ALLERGIES: reviewed    PHYSICAL EXAM:    VITALS: Vitals reviewed.   Vitals:    10/14/22 1236   BP: (!) 153/79   Pulse: 60   Weight: 64.9 kg (143 lb)   Height: 5' 6" (1.676 m)   PainSc:   4   PainLoc: Back       Physical Exam  Vitals and nursing note reviewed.   Constitutional:       Appearance: She is not diaphoretic.   HENT:      Head: Normocephalic and atraumatic.   Eyes:      General:         Right eye: No discharge.         Left eye: No discharge.      Conjunctiva/sclera: Conjunctivae normal.   Cardiovascular:      Rate and Rhythm: Normal rate.   Pulmonary:      Effort: Pulmonary effort is normal. No respiratory distress.      Breath sounds: Normal breath sounds.   Abdominal:      Palpations: Abdomen is soft.   Skin:     General: Skin is warm and dry.      Findings: No rash.   Neurological:      Mental Status: She is alert and oriented to person, place, and time.   Psychiatric:         Mood and Affect: Mood and affect normal.         Cognition and Memory: Memory normal.         Judgment: Judgment normal.      UPPER EXTREMITIES: Normal alignment, normal range of motion, no atrophy, no skin changes,  hair growth and nail growth normal and equal bilaterally. No swelling, no tenderness.    LOWER EXTREMITIES:  Normal alignment, normal range of motion, no atrophy, no skin changes,  hair growth and nail growth normal and equal bilaterally. No swelling, no tenderness.     LUMBAR SPINE; patient with severe kyphosis of the lumbar spine  Lumbar spine: ROM is limited with flexion extension and oblique extension with increased pain with passive ROM.    ((+)) Facet loading   Myofascial exam: Tenderness to palpation across lumbar paraspinous muscles. Prior midline scar is well-healed.      MOTOR: Tone and bulk: normal bilateral upper and lower Strength: normal   Delt      Bi         Tri        WE      WF                        R          5          5          5          5          5       "    5            L          5          5          5          5          5          5               IP         ADD     ABD     Quad   TA        Gas      HAM  R          5          5          5          5          5          5          5  L          5          5          5          5          5          5          5     SENSATION: Decreased sensation in the lower extremities to light touch  REFLEXES: normal, symmetric, nonbrisk.  Toes down, no clonus. Negative dooley's sign bilaterally.  GAIT: antalgic gait; uses a single point cane for assistance with ambulation    IMAGING: no new spine imaging to review    ASSESSMENT:  Froilan Ray is a 76 y.o. female with PMH significant for hx of SCS implantation (Dr. Noel; 2013) with subsequent explantation, hx of ORIF of left intertrochanteric fracture (Dr. De La Cruz; 2018), hx of seizure disorder (well controlled), and HTN presents as an established patient for the continued management of back pain. The patient continues to localize her pain to the area across her lower back with radiation down her BLE's to her feet. The patient reports that her pain is improved with her current pain regimen. The patient presents today for a medication refill. Treatment plan outlined below.     PLAN:  1. Refilled Percocet  mg PO q 6 hr PRN for breakthrough pain; # 140 tablets; 2 refills.  reviewed without discrepancies. Extra 20 tablets provided for to take PRN as she reports that she requires an additional tablet of Percocet as her pain is rather severe on certain days  2. Continue lyrica 200 mg PO TID for neuropathic pain  3. UDS to be collected today.   4. I have stressed the importance of physical activity and a home exercise plan to help with chronic pain and improve health.  5. Would offer lumbar MBBs in the future if her pain were to worsen  6. RTC in 3 months for follow-up    Medication management provided by Luzmaria Marcelino MD  Pain Management

## 2022-10-22 LAB
6MAM UR QL: NOT DETECTED
7AMINOCLONAZEPAM UR QL: NOT DETECTED
A-OH ALPRAZ UR QL: NOT DETECTED
ALPHA-OH-MIDAZOLAM: NOT DETECTED
ALPRAZ UR QL: NOT DETECTED
AMPHET UR QL SCN: NOT DETECTED
ANNOTATION COMMENT IMP: NORMAL
ANNOTATION COMMENT IMP: NORMAL
BARBITURATES UR QL: NOT DETECTED
BUPRENORPHINE UR QL: NOT DETECTED
BZE UR QL: NOT DETECTED
CARBOXYTHC UR QL: NOT DETECTED
CARISOPRODOL UR QL: NOT DETECTED
CLONAZEPAM UR QL: NOT DETECTED
CODEINE UR QL: NOT DETECTED
CREAT UR-MCNC: 32.9 MG/DL (ref 20–400)
DIAZEPAM UR QL: NOT DETECTED
ETHYL GLUCURONIDE UR QL: NOT DETECTED
FENTANYL UR QL: NOT DETECTED
GABAPENTIN: NOT DETECTED
HYDROCODONE UR QL: PRESENT
HYDROMORPHONE UR QL: PRESENT
LORAZEPAM UR QL: NOT DETECTED
MDA UR QL: NOT DETECTED
MDEA UR QL: NOT DETECTED
MDMA UR QL: NOT DETECTED
ME-PHENIDATE UR QL: NOT DETECTED
METHADONE UR QL: NOT DETECTED
METHAMPHET UR QL: NOT DETECTED
MIDAZOLAM UR QL SCN: NOT DETECTED
MORPHINE UR QL: NOT DETECTED
NALOXONE: NOT DETECTED
NORBUPRENORPHINE UR QL CFM: NOT DETECTED
NORDIAZEPAM UR QL: NOT DETECTED
NORFENTANYL UR QL: NOT DETECTED
NORHYDROCODONE UR QL CFM: PRESENT
NORMEPERIDINE UR QL CFM: NOT DETECTED
NOROXYCODONE UR QL CFM: PRESENT
NOROXYMORPHONE UR QL SCN: PRESENT
OXAZEPAM UR QL: NOT DETECTED
OXYCODONE UR QL: PRESENT
OXYMORPHONE UR QL: PRESENT
PATHOLOGY STUDY: NORMAL
PCP UR QL: NOT DETECTED
PHENTERMINE UR QL: NOT DETECTED
PREGABALIN: PRESENT
SERVICE CMNT-IMP: NORMAL
TAPENTADOL UR QL SCN: NOT DETECTED
TAPENTADOL UR QL SCN: NOT DETECTED
TEMAZEPAM UR QL: NOT DETECTED
TRAMADOL UR QL: NOT DETECTED
ZOLPIDEM METABOLITE: NOT DETECTED
ZOLPIDEM UR QL: NOT DETECTED

## 2022-10-26 RX ORDER — OXYCODONE AND ACETAMINOPHEN 10; 325 MG/1; MG/1
TABLET ORAL
Qty: 140 TABLET | Refills: 0 | Status: SHIPPED | OUTPATIENT
Start: 2022-12-24 | End: 2022-11-30

## 2022-11-07 ENCOUNTER — TELEPHONE (OUTPATIENT)
Dept: PAIN MEDICINE | Facility: CLINIC | Age: 77
End: 2022-11-07
Payer: MEDICARE

## 2022-11-07 NOTE — TELEPHONE ENCOUNTER
----- Message from Pau Woody sent at 11/7/2022 10:44 AM CST -----  Contact: [phone]  Type:  RX Refill Request    Who Called:  Pt   Refill or New Rx:  refill  RX Name and Strength:  pregabalin (LYRICA) 200 MG Cap  How is the patient currently taking it? (ex. 1XDay):  3xday   Is this a 30 day or 90 day RX:  30  Preferred Pharmacy with phone number:    [id number] - [place] - [address] AT [facility]  [address]  [place] [address]  Phone: [phone] Fax: [phone]      Local or Mail Order:  Local   Ordering Provider:  Glorisosco   Best Call Back Number:  [phone]

## 2022-11-30 ENCOUNTER — HOSPITAL ENCOUNTER (OUTPATIENT)
Facility: HOSPITAL | Age: 77
Discharge: HOME | End: 2022-12-02
Attending: EMERGENCY MEDICINE | Admitting: FAMILY MEDICINE
Payer: MEDICARE

## 2022-11-30 DIAGNOSIS — G93.41 ENCEPHALOPATHY, METABOLIC: ICD-10-CM

## 2022-11-30 DIAGNOSIS — R07.9 CHEST PAIN: ICD-10-CM

## 2022-11-30 DIAGNOSIS — R50.9 FEVER: ICD-10-CM

## 2022-11-30 LAB
ADENOVIRUS: NOT DETECTED
ALBUMIN SERPL BCP-MCNC: 3.7 G/DL (ref 3.5–5.2)
ALP SERPL-CCNC: 78 U/L (ref 55–135)
ALT SERPL W/O P-5'-P-CCNC: 10 U/L (ref 10–44)
ANION GAP SERPL CALC-SCNC: 10 MMOL/L (ref 8–16)
APTT PPP: 30.3 SEC (ref 23.3–35.1)
AST SERPL-CCNC: 18 U/L (ref 10–40)
BACTERIA #/AREA URNS HPF: NEGATIVE /HPF
BASOPHILS # BLD AUTO: 0.04 K/UL (ref 0–0.2)
BASOPHILS NFR BLD: 0.8 % (ref 0–1.9)
BILIRUB SERPL-MCNC: 0.8 MG/DL (ref 0.1–1)
BILIRUB UR QL STRIP: ABNORMAL
BNP SERPL-MCNC: 124 PG/ML (ref 0–99)
BORDETELLA PARAPERTUSSIS (IS1001): NOT DETECTED
BORDETELLA PERTUSSIS (PTXP): NOT DETECTED
BUN SERPL-MCNC: 9 MG/DL (ref 8–23)
CALCIUM SERPL-MCNC: 8.8 MG/DL (ref 8.7–10.5)
CHLAMYDIA PNEUMONIAE: NOT DETECTED
CHLORIDE SERPL-SCNC: 96 MMOL/L (ref 95–110)
CLARITY UR: CLEAR
CO2 SERPL-SCNC: 25 MMOL/L (ref 23–29)
COLOR UR: YELLOW
CORONAVIRUS 229E, COMMON COLD VIRUS: NOT DETECTED
CORONAVIRUS HKU1, COMMON COLD VIRUS: NOT DETECTED
CORONAVIRUS NL63, COMMON COLD VIRUS: NOT DETECTED
CORONAVIRUS OC43, COMMON COLD VIRUS: NOT DETECTED
CREAT SERPL-MCNC: 0.5 MG/DL (ref 0.5–1.4)
DIFFERENTIAL METHOD: ABNORMAL
EOSINOPHIL # BLD AUTO: 0 K/UL (ref 0–0.5)
EOSINOPHIL NFR BLD: 0.2 % (ref 0–8)
ERYTHROCYTE [DISTWIDTH] IN BLOOD BY AUTOMATED COUNT: 12.9 % (ref 11.5–14.5)
EST. GFR  (NO RACE VARIABLE): >60 ML/MIN/1.73 M^2
FLUBV RNA NPH QL NAA+NON-PROBE: NOT DETECTED
GLUCOSE SERPL-MCNC: 105 MG/DL (ref 70–110)
GLUCOSE UR QL STRIP: NEGATIVE
HCT VFR BLD AUTO: 35.5 % (ref 37–48.5)
HGB BLD-MCNC: 11.5 G/DL (ref 12–16)
HGB UR QL STRIP: NEGATIVE
HPIV1 RNA NPH QL NAA+NON-PROBE: NOT DETECTED
HPIV2 RNA NPH QL NAA+NON-PROBE: NOT DETECTED
HPIV3 RNA NPH QL NAA+NON-PROBE: NOT DETECTED
HPIV4 RNA NPH QL NAA+NON-PROBE: NOT DETECTED
HUMAN METAPNEUMOVIRUS: NOT DETECTED
HYALINE CASTS #/AREA URNS LPF: 2 /LPF
IMM GRANULOCYTES # BLD AUTO: 0.01 K/UL (ref 0–0.04)
IMM GRANULOCYTES NFR BLD AUTO: 0.2 % (ref 0–0.5)
INFLUENZA A (SUBTYPES H1,H1-2009,H3): NOT DETECTED
INFLUENZA A, MOLECULAR: NEGATIVE
INFLUENZA B, MOLECULAR: NEGATIVE
INR PPP: 1.1
KETONES UR QL STRIP: ABNORMAL
LACTATE SERPL-SCNC: 1 MMOL/L (ref 0.5–1.9)
LEUKOCYTE ESTERASE UR QL STRIP: NEGATIVE
LIPASE SERPL-CCNC: 20 U/L (ref 4–60)
LYMPHOCYTES # BLD AUTO: 1.2 K/UL (ref 1–4.8)
LYMPHOCYTES NFR BLD: 25.4 % (ref 18–48)
MAGNESIUM SERPL-MCNC: 1.7 MG/DL (ref 1.6–2.6)
MCH RBC QN AUTO: 30.2 PG (ref 27–31)
MCHC RBC AUTO-ENTMCNC: 32.4 G/DL (ref 32–36)
MCV RBC AUTO: 93 FL (ref 82–98)
MICROSCOPIC COMMENT: ABNORMAL
MONOCYTES # BLD AUTO: 0.4 K/UL (ref 0.3–1)
MONOCYTES NFR BLD: 7.7 % (ref 4–15)
MYCOPLASMA PNEUMONIAE: NOT DETECTED
NEUTROPHILS # BLD AUTO: 3.2 K/UL (ref 1.8–7.7)
NEUTROPHILS NFR BLD: 65.7 % (ref 38–73)
NITRITE UR QL STRIP: NEGATIVE
NRBC BLD-RTO: 0 /100 WBC
PH UR STRIP: 6 [PH] (ref 5–8)
PLATELET # BLD AUTO: 492 K/UL (ref 150–450)
PMV BLD AUTO: 9.4 FL (ref 9.2–12.9)
POTASSIUM SERPL-SCNC: 3.5 MMOL/L (ref 3.5–5.1)
PROT SERPL-MCNC: 7.1 G/DL (ref 6–8.4)
PROT UR QL STRIP: ABNORMAL
PROTHROMBIN TIME: 13.3 SEC (ref 11.4–13.7)
RBC # BLD AUTO: 3.81 M/UL (ref 4–5.4)
RBC #/AREA URNS HPF: 2 /HPF (ref 0–4)
RESPIRATORY INFECTION PANEL SOURCE: NORMAL
RSV RNA NPH QL NAA+NON-PROBE: NOT DETECTED
RV+EV RNA NPH QL NAA+NON-PROBE: NOT DETECTED
SARS-COV-2 RDRP RESP QL NAA+PROBE: NEGATIVE
SARS-COV-2 RNA RESP QL NAA+PROBE: NOT DETECTED
SODIUM SERPL-SCNC: 131 MMOL/L (ref 136–145)
SP GR UR STRIP: 1.02 (ref 1–1.03)
SPECIMEN SOURCE: NORMAL
SQUAMOUS #/AREA URNS HPF: 1 /HPF
TROPONIN I SERPL HS-MCNC: 8.1 PG/ML (ref 0–14.9)
URN SPEC COLLECT METH UR: ABNORMAL
UROBILINOGEN UR STRIP-ACNC: NEGATIVE EU/DL
WBC # BLD AUTO: 4.8 K/UL (ref 3.9–12.7)
WBC #/AREA URNS HPF: 0 /HPF (ref 0–5)

## 2022-11-30 PROCEDURE — U0002 COVID-19 LAB TEST NON-CDC: HCPCS | Performed by: EMERGENCY MEDICINE

## 2022-11-30 PROCEDURE — 83690 ASSAY OF LIPASE: CPT | Performed by: EMERGENCY MEDICINE

## 2022-11-30 PROCEDURE — 99285 EMERGENCY DEPT VISIT HI MDM: CPT | Mod: 25

## 2022-11-30 PROCEDURE — 81001 URINALYSIS AUTO W/SCOPE: CPT | Performed by: EMERGENCY MEDICINE

## 2022-11-30 PROCEDURE — 84145 PROCALCITONIN (PCT): CPT | Performed by: EMERGENCY MEDICINE

## 2022-11-30 PROCEDURE — 83735 ASSAY OF MAGNESIUM: CPT | Performed by: EMERGENCY MEDICINE

## 2022-11-30 PROCEDURE — 87502 INFLUENZA DNA AMP PROBE: CPT | Performed by: EMERGENCY MEDICINE

## 2022-11-30 PROCEDURE — 83880 ASSAY OF NATRIURETIC PEPTIDE: CPT | Performed by: EMERGENCY MEDICINE

## 2022-11-30 PROCEDURE — 87154 CUL TYP ID BLD PTHGN 6+ TRGT: CPT | Mod: 59 | Performed by: EMERGENCY MEDICINE

## 2022-11-30 PROCEDURE — 93005 ELECTROCARDIOGRAM TRACING: CPT | Performed by: INTERNAL MEDICINE

## 2022-11-30 PROCEDURE — 25500020 PHARM REV CODE 255: Performed by: EMERGENCY MEDICINE

## 2022-11-30 PROCEDURE — 96365 THER/PROPH/DIAG IV INF INIT: CPT

## 2022-11-30 PROCEDURE — 87147 CULTURE TYPE IMMUNOLOGIC: CPT | Performed by: EMERGENCY MEDICINE

## 2022-11-30 PROCEDURE — 85025 COMPLETE CBC W/AUTO DIFF WBC: CPT | Performed by: EMERGENCY MEDICINE

## 2022-11-30 PROCEDURE — G0378 HOSPITAL OBSERVATION PER HR: HCPCS

## 2022-11-30 PROCEDURE — 63600175 PHARM REV CODE 636 W HCPCS: Performed by: FAMILY MEDICINE

## 2022-11-30 PROCEDURE — 36415 COLL VENOUS BLD VENIPUNCTURE: CPT | Performed by: EMERGENCY MEDICINE

## 2022-11-30 PROCEDURE — 83605 ASSAY OF LACTIC ACID: CPT | Performed by: EMERGENCY MEDICINE

## 2022-11-30 PROCEDURE — 93010 ELECTROCARDIOGRAM REPORT: CPT | Mod: ,,, | Performed by: INTERNAL MEDICINE

## 2022-11-30 PROCEDURE — 63600175 PHARM REV CODE 636 W HCPCS: Performed by: EMERGENCY MEDICINE

## 2022-11-30 PROCEDURE — 25000003 PHARM REV CODE 250: Performed by: FAMILY MEDICINE

## 2022-11-30 PROCEDURE — 96361 HYDRATE IV INFUSION ADD-ON: CPT

## 2022-11-30 PROCEDURE — 85730 THROMBOPLASTIN TIME PARTIAL: CPT | Performed by: EMERGENCY MEDICINE

## 2022-11-30 PROCEDURE — 87040 BLOOD CULTURE FOR BACTERIA: CPT | Performed by: EMERGENCY MEDICINE

## 2022-11-30 PROCEDURE — 87798 DETECT AGENT NOS DNA AMP: CPT | Performed by: EMERGENCY MEDICINE

## 2022-11-30 PROCEDURE — 85610 PROTHROMBIN TIME: CPT | Performed by: EMERGENCY MEDICINE

## 2022-11-30 PROCEDURE — 96367 TX/PROPH/DG ADDL SEQ IV INF: CPT

## 2022-11-30 PROCEDURE — 25000003 PHARM REV CODE 250: Performed by: EMERGENCY MEDICINE

## 2022-11-30 PROCEDURE — 93010 EKG 12-LEAD: ICD-10-PCS | Mod: ,,, | Performed by: INTERNAL MEDICINE

## 2022-11-30 PROCEDURE — 84484 ASSAY OF TROPONIN QUANT: CPT | Performed by: EMERGENCY MEDICINE

## 2022-11-30 PROCEDURE — 80053 COMPREHEN METABOLIC PANEL: CPT | Performed by: EMERGENCY MEDICINE

## 2022-11-30 PROCEDURE — 96368 THER/DIAG CONCURRENT INF: CPT

## 2022-11-30 RX ORDER — ACETAMINOPHEN 500 MG
1000 TABLET ORAL EVERY 8 HOURS PRN
Status: DISCONTINUED | OUTPATIENT
Start: 2022-11-30 | End: 2022-12-02 | Stop reason: HOSPADM

## 2022-11-30 RX ORDER — LACTULOSE 10 G/15ML
30 SOLUTION ORAL ONCE
Status: DISCONTINUED | OUTPATIENT
Start: 2022-11-30 | End: 2022-11-30

## 2022-11-30 RX ORDER — SODIUM CHLORIDE 9 MG/ML
INJECTION, SOLUTION INTRAVENOUS CONTINUOUS
Status: DISCONTINUED | OUTPATIENT
Start: 2022-11-30 | End: 2022-12-01

## 2022-11-30 RX ORDER — LACTULOSE 10 G/15ML
30 SOLUTION ORAL 3 TIMES DAILY
Status: DISPENSED | OUTPATIENT
Start: 2022-12-01 | End: 2022-12-02

## 2022-11-30 RX ORDER — POTASSIUM CHLORIDE 7.45 MG/ML
10 INJECTION INTRAVENOUS ONCE
Status: COMPLETED | OUTPATIENT
Start: 2022-11-30 | End: 2022-12-01

## 2022-11-30 RX ORDER — MAGNESIUM SULFATE HEPTAHYDRATE 40 MG/ML
2 INJECTION, SOLUTION INTRAVENOUS ONCE
Status: COMPLETED | OUTPATIENT
Start: 2022-11-30 | End: 2022-12-01

## 2022-11-30 RX ORDER — ACETAMINOPHEN 500 MG
1000 TABLET ORAL
Status: COMPLETED | OUTPATIENT
Start: 2022-11-30 | End: 2022-11-30

## 2022-11-30 RX ADMIN — CEFTRIAXONE 2 G: 2 INJECTION, SOLUTION INTRAVENOUS at 08:11

## 2022-11-30 RX ADMIN — SODIUM CHLORIDE, SODIUM LACTATE, POTASSIUM CHLORIDE, AND CALCIUM CHLORIDE 1905 ML: .6; .31; .03; .02 INJECTION, SOLUTION INTRAVENOUS at 07:11

## 2022-11-30 RX ADMIN — ACETAMINOPHEN 1000 MG: 500 TABLET ORAL at 07:11

## 2022-11-30 RX ADMIN — IOHEXOL 100 ML: 350 INJECTION, SOLUTION INTRAVENOUS at 08:11

## 2022-11-30 RX ADMIN — SODIUM CHLORIDE 500 ML: 0.9 INJECTION, SOLUTION INTRAVENOUS at 11:11

## 2022-11-30 RX ADMIN — PIPERACILLIN SODIUM AND TAZOBACTAM SODIUM 3.38 G: 3; .375 INJECTION, POWDER, LYOPHILIZED, FOR SOLUTION INTRAVENOUS at 11:11

## 2022-11-30 RX ADMIN — POTASSIUM CHLORIDE 10 MEQ: 7.46 INJECTION, SOLUTION INTRAVENOUS at 11:11

## 2022-12-01 LAB
ACINETOBACTER CALCOACETICUS/BAUMANNII COMPLEX: NOT DETECTED
ALBUMIN SERPL BCP-MCNC: 3.3 G/DL (ref 3.5–5.2)
ALP SERPL-CCNC: 72 U/L (ref 55–135)
ALT SERPL W/O P-5'-P-CCNC: 10 U/L (ref 10–44)
ANION GAP SERPL CALC-SCNC: 11 MMOL/L (ref 8–16)
AST SERPL-CCNC: 13 U/L (ref 10–40)
BACTEROIDES FRAGILIS: NOT DETECTED
BASOPHILS # BLD AUTO: 0.04 K/UL (ref 0–0.2)
BASOPHILS NFR BLD: 1 % (ref 0–1.9)
BILIRUB SERPL-MCNC: 0.8 MG/DL (ref 0.1–1)
BUN SERPL-MCNC: 7 MG/DL (ref 8–23)
CALCIUM SERPL-MCNC: 8.6 MG/DL (ref 8.7–10.5)
CANDIDA ALBICANS: NOT DETECTED
CANDIDA AURIS: NOT DETECTED
CANDIDA GLABRATA: NOT DETECTED
CANDIDA KRUSEI: NOT DETECTED
CANDIDA PARAPSILOSIS: NOT DETECTED
CANDIDA TROPICALIS: NOT DETECTED
CHLORIDE SERPL-SCNC: 100 MMOL/L (ref 95–110)
CO2 SERPL-SCNC: 22 MMOL/L (ref 23–29)
CREAT SERPL-MCNC: 0.4 MG/DL (ref 0.5–1.4)
CRYPTOCOCCUS NEOFORMANS/GATTII: NOT DETECTED
CTX-M GENE: ABNORMAL
DIFFERENTIAL METHOD: ABNORMAL
ENTEROBACTER CLOACAE COMPLEX: NOT DETECTED
ENTEROBACTERALES: NOT DETECTED
ENTEROCOCCUS FAECALIS: NOT DETECTED
ENTEROCOCCUS FAECIUM: NOT DETECTED
EOSINOPHIL # BLD AUTO: 0 K/UL (ref 0–0.5)
EOSINOPHIL NFR BLD: 0.5 % (ref 0–8)
ERYTHROCYTE [DISTWIDTH] IN BLOOD BY AUTOMATED COUNT: 13 % (ref 11.5–14.5)
ESCHERICHIA COLI: NOT DETECTED
EST. GFR  (NO RACE VARIABLE): >60 ML/MIN/1.73 M^2
ESTIMATED AVG GLUCOSE: 103 MG/DL (ref 68–131)
GLUCOSE SERPL-MCNC: 101 MG/DL (ref 70–110)
HAEMOPHILUS INFLUENZAE: NOT DETECTED
HBA1C MFR BLD: 5.2 % (ref 4.5–6.2)
HCT VFR BLD AUTO: 34.2 % (ref 37–48.5)
HGB BLD-MCNC: 11 G/DL (ref 12–16)
IMM GRANULOCYTES # BLD AUTO: 0.01 K/UL (ref 0–0.04)
IMM GRANULOCYTES NFR BLD AUTO: 0.2 % (ref 0–0.5)
IMP GENE: ABNORMAL
KLEBSIELLA AEROGENES: NOT DETECTED
KLEBSIELLA OXYTOCA: NOT DETECTED
KLEBSIELLA PNEUMONIAE GROUP: NOT DETECTED
KPC: ABNORMAL
LISTERIA MONOCYTOGENES: NOT DETECTED
LYMPHOCYTES # BLD AUTO: 1.1 K/UL (ref 1–4.8)
LYMPHOCYTES NFR BLD: 27.1 % (ref 18–48)
MAGNESIUM SERPL-MCNC: 2.5 MG/DL (ref 1.6–2.6)
MCH RBC QN AUTO: 30 PG (ref 27–31)
MCHC RBC AUTO-ENTMCNC: 32.2 G/DL (ref 32–36)
MCR-1: ABNORMAL
MCV RBC AUTO: 93 FL (ref 82–98)
MEC A/C AND MREJ (MRSA): ABNORMAL
MEC A/C: DETECTED
MONOCYTES # BLD AUTO: 0.3 K/UL (ref 0.3–1)
MONOCYTES NFR BLD: 7.9 % (ref 4–15)
MRSA SCREEN BY PCR: POSITIVE
NDM: ABNORMAL
NEISSERIA MENINGITIDIS: NOT DETECTED
NEUTROPHILS # BLD AUTO: 2.6 K/UL (ref 1.8–7.7)
NEUTROPHILS NFR BLD: 63.3 % (ref 38–73)
NRBC BLD-RTO: 0 /100 WBC
OXA-48-LIKE: ABNORMAL
PLATELET # BLD AUTO: 468 K/UL (ref 150–450)
PMV BLD AUTO: 9.4 FL (ref 9.2–12.9)
POTASSIUM SERPL-SCNC: 3.2 MMOL/L (ref 3.5–5.1)
PROCALCITONIN SERPL IA-MCNC: <0.05 NG/ML (ref 0–0.5)
PROT SERPL-MCNC: 6.5 G/DL (ref 6–8.4)
PROTEUS SPECIES: NOT DETECTED
PSEUDOMONAS AERUGINOSA: NOT DETECTED
RBC # BLD AUTO: 3.67 M/UL (ref 4–5.4)
SALMONELLA SP: NOT DETECTED
SERRATIA MARCESCENS: NOT DETECTED
SODIUM SERPL-SCNC: 133 MMOL/L (ref 136–145)
STAPHYLOCOCCUS AUREUS: NOT DETECTED
STAPHYLOCOCCUS EPIDERMIDIS: DETECTED
STAPHYLOCOCCUS LUGDUNESIS: NOT DETECTED
STAPHYLOCOCCUS SPECIES: ABNORMAL
STENOTROPHOMONAS MALTOPHILIA: NOT DETECTED
STREPTOCOCCUS AGALACTIAE: NOT DETECTED
STREPTOCOCCUS PNEUMONIAE: NOT DETECTED
STREPTOCOCCUS PYOGENES: NOT DETECTED
STREPTOCOCCUS SPECIES: NOT DETECTED
TROPONIN I SERPL HS-MCNC: 8.6 PG/ML (ref 0–14.9)
VAN A/B: ABNORMAL
VIM: ABNORMAL
WBC # BLD AUTO: 4.17 K/UL (ref 3.9–12.7)

## 2022-12-01 PROCEDURE — 96376 TX/PRO/DX INJ SAME DRUG ADON: CPT

## 2022-12-01 PROCEDURE — 87641 MR-STAPH DNA AMP PROBE: CPT | Performed by: FAMILY MEDICINE

## 2022-12-01 PROCEDURE — 96372 THER/PROPH/DIAG INJ SC/IM: CPT | Performed by: FAMILY MEDICINE

## 2022-12-01 PROCEDURE — 25000003 PHARM REV CODE 250: Performed by: FAMILY MEDICINE

## 2022-12-01 PROCEDURE — 83036 HEMOGLOBIN GLYCOSYLATED A1C: CPT | Performed by: FAMILY MEDICINE

## 2022-12-01 PROCEDURE — 84484 ASSAY OF TROPONIN QUANT: CPT | Performed by: FAMILY MEDICINE

## 2022-12-01 PROCEDURE — 80053 COMPREHEN METABOLIC PANEL: CPT | Performed by: FAMILY MEDICINE

## 2022-12-01 PROCEDURE — C9113 INJ PANTOPRAZOLE SODIUM, VIA: HCPCS | Performed by: FAMILY MEDICINE

## 2022-12-01 PROCEDURE — 25000003 PHARM REV CODE 250: Performed by: INTERNAL MEDICINE

## 2022-12-01 PROCEDURE — 96366 THER/PROPH/DIAG IV INF ADDON: CPT

## 2022-12-01 PROCEDURE — 85025 COMPLETE CBC W/AUTO DIFF WBC: CPT | Performed by: FAMILY MEDICINE

## 2022-12-01 PROCEDURE — G0378 HOSPITAL OBSERVATION PER HR: HCPCS

## 2022-12-01 PROCEDURE — 96367 TX/PROPH/DG ADDL SEQ IV INF: CPT

## 2022-12-01 PROCEDURE — 87040 BLOOD CULTURE FOR BACTERIA: CPT | Performed by: INTERNAL MEDICINE

## 2022-12-01 PROCEDURE — 36415 COLL VENOUS BLD VENIPUNCTURE: CPT | Performed by: INTERNAL MEDICINE

## 2022-12-01 PROCEDURE — 63600175 PHARM REV CODE 636 W HCPCS: Performed by: FAMILY MEDICINE

## 2022-12-01 PROCEDURE — 96361 HYDRATE IV INFUSION ADD-ON: CPT

## 2022-12-01 PROCEDURE — 83735 ASSAY OF MAGNESIUM: CPT | Performed by: FAMILY MEDICINE

## 2022-12-01 PROCEDURE — 96375 TX/PRO/DX INJ NEW DRUG ADDON: CPT

## 2022-12-01 PROCEDURE — 36415 COLL VENOUS BLD VENIPUNCTURE: CPT | Performed by: FAMILY MEDICINE

## 2022-12-01 RX ORDER — DOCUSATE SODIUM 100 MG/1
100 CAPSULE, LIQUID FILLED ORAL 2 TIMES DAILY
Status: DISCONTINUED | OUTPATIENT
Start: 2022-12-01 | End: 2022-12-02 | Stop reason: HOSPADM

## 2022-12-01 RX ORDER — SODIUM,POTASSIUM PHOSPHATES 280-250MG
2 POWDER IN PACKET (EA) ORAL
Status: DISCONTINUED | OUTPATIENT
Start: 2022-12-01 | End: 2022-12-02 | Stop reason: HOSPADM

## 2022-12-01 RX ORDER — HYDROCODONE BITARTRATE AND ACETAMINOPHEN 7.5; 325 MG/1; MG/1
1 TABLET ORAL EVERY 6 HOURS PRN
Status: DISCONTINUED | OUTPATIENT
Start: 2022-12-01 | End: 2022-12-02 | Stop reason: HOSPADM

## 2022-12-01 RX ORDER — LISINOPRIL 20 MG/1
20 TABLET ORAL DAILY
Status: DISCONTINUED | OUTPATIENT
Start: 2022-12-01 | End: 2022-12-02

## 2022-12-01 RX ORDER — NALOXONE HCL 0.4 MG/ML
0.02 VIAL (ML) INJECTION
Status: DISCONTINUED | OUTPATIENT
Start: 2022-12-01 | End: 2022-12-02 | Stop reason: HOSPADM

## 2022-12-01 RX ORDER — MORPHINE SULFATE 4 MG/ML
4 INJECTION, SOLUTION INTRAMUSCULAR; INTRAVENOUS EVERY 4 HOURS PRN
Status: DISCONTINUED | OUTPATIENT
Start: 2022-12-01 | End: 2022-12-01

## 2022-12-01 RX ORDER — LANOLIN ALCOHOL/MO/W.PET/CERES
800 CREAM (GRAM) TOPICAL
Status: DISCONTINUED | OUTPATIENT
Start: 2022-12-01 | End: 2022-12-02 | Stop reason: HOSPADM

## 2022-12-01 RX ORDER — METOPROLOL TARTRATE 1 MG/ML
2.5 INJECTION, SOLUTION INTRAVENOUS EVERY 6 HOURS PRN
Status: DISCONTINUED | OUTPATIENT
Start: 2022-12-01 | End: 2022-12-01

## 2022-12-01 RX ORDER — SODIUM CHLORIDE 0.9 % (FLUSH) 0.9 %
10 SYRINGE (ML) INJECTION
Status: DISCONTINUED | OUTPATIENT
Start: 2022-12-01 | End: 2022-12-02 | Stop reason: HOSPADM

## 2022-12-01 RX ORDER — TALC
6 POWDER (GRAM) TOPICAL NIGHTLY PRN
Status: DISCONTINUED | OUTPATIENT
Start: 2022-12-01 | End: 2022-12-02 | Stop reason: HOSPADM

## 2022-12-01 RX ORDER — SIMETHICONE 80 MG
1 TABLET,CHEWABLE ORAL 4 TIMES DAILY PRN
Status: DISCONTINUED | OUTPATIENT
Start: 2022-12-01 | End: 2022-12-02 | Stop reason: HOSPADM

## 2022-12-01 RX ORDER — ONDANSETRON 2 MG/ML
4 INJECTION INTRAMUSCULAR; INTRAVENOUS EVERY 6 HOURS PRN
Status: DISCONTINUED | OUTPATIENT
Start: 2022-12-01 | End: 2022-12-02 | Stop reason: HOSPADM

## 2022-12-01 RX ORDER — METOPROLOL TARTRATE 50 MG/1
50 TABLET ORAL 3 TIMES DAILY
Status: DISCONTINUED | OUTPATIENT
Start: 2022-12-01 | End: 2022-12-02 | Stop reason: HOSPADM

## 2022-12-01 RX ORDER — PANTOPRAZOLE SODIUM 40 MG/1
40 TABLET, DELAYED RELEASE ORAL DAILY
Status: DISCONTINUED | OUTPATIENT
Start: 2022-12-02 | End: 2022-12-02 | Stop reason: HOSPADM

## 2022-12-01 RX ORDER — AMIODARONE HYDROCHLORIDE 200 MG/1
200 TABLET ORAL DAILY
Status: DISCONTINUED | OUTPATIENT
Start: 2022-12-01 | End: 2022-12-02 | Stop reason: HOSPADM

## 2022-12-01 RX ORDER — AMOXICILLIN 250 MG
1 CAPSULE ORAL 2 TIMES DAILY PRN
Status: DISCONTINUED | OUTPATIENT
Start: 2022-12-01 | End: 2022-12-02 | Stop reason: HOSPADM

## 2022-12-01 RX ORDER — AMLODIPINE BESYLATE 5 MG/1
10 TABLET ORAL DAILY
Status: DISCONTINUED | OUTPATIENT
Start: 2022-12-01 | End: 2022-12-02 | Stop reason: HOSPADM

## 2022-12-01 RX ORDER — ENOXAPARIN SODIUM 100 MG/ML
1 INJECTION SUBCUTANEOUS 2 TIMES DAILY
Status: DISCONTINUED | OUTPATIENT
Start: 2022-12-01 | End: 2022-12-02 | Stop reason: HOSPADM

## 2022-12-01 RX ORDER — POLYETHYLENE GLYCOL 3350 17 G/17G
17 POWDER, FOR SOLUTION ORAL 2 TIMES DAILY PRN
Status: DISCONTINUED | OUTPATIENT
Start: 2022-12-01 | End: 2022-12-02 | Stop reason: HOSPADM

## 2022-12-01 RX ORDER — IBUPROFEN 200 MG
24 TABLET ORAL
Status: DISCONTINUED | OUTPATIENT
Start: 2022-12-01 | End: 2022-12-02 | Stop reason: HOSPADM

## 2022-12-01 RX ORDER — IBUPROFEN 200 MG
16 TABLET ORAL
Status: DISCONTINUED | OUTPATIENT
Start: 2022-12-01 | End: 2022-12-02 | Stop reason: HOSPADM

## 2022-12-01 RX ORDER — IPRATROPIUM BROMIDE AND ALBUTEROL SULFATE 2.5; .5 MG/3ML; MG/3ML
3 SOLUTION RESPIRATORY (INHALATION) EVERY 4 HOURS PRN
Status: DISCONTINUED | OUTPATIENT
Start: 2022-12-01 | End: 2022-12-02 | Stop reason: HOSPADM

## 2022-12-01 RX ORDER — PANTOPRAZOLE SODIUM 40 MG/10ML
40 INJECTION, POWDER, LYOPHILIZED, FOR SOLUTION INTRAVENOUS DAILY
Status: DISCONTINUED | OUTPATIENT
Start: 2022-12-01 | End: 2022-12-01

## 2022-12-01 RX ORDER — HYDROCODONE BITARTRATE AND ACETAMINOPHEN 5; 325 MG/1; MG/1
1 TABLET ORAL EVERY 4 HOURS PRN
Status: DISCONTINUED | OUTPATIENT
Start: 2022-12-01 | End: 2022-12-01

## 2022-12-01 RX ORDER — ESCITALOPRAM OXALATE 10 MG/1
20 TABLET ORAL DAILY
Status: DISCONTINUED | OUTPATIENT
Start: 2022-12-01 | End: 2022-12-02 | Stop reason: HOSPADM

## 2022-12-01 RX ORDER — GLUCAGON 1 MG
1 KIT INJECTION
Status: DISCONTINUED | OUTPATIENT
Start: 2022-12-01 | End: 2022-12-02 | Stop reason: HOSPADM

## 2022-12-01 RX ADMIN — ENOXAPARIN SODIUM 60 MG: 60 INJECTION SUBCUTANEOUS at 01:12

## 2022-12-01 RX ADMIN — DOCUSATE SODIUM 100 MG: 100 CAPSULE, LIQUID FILLED ORAL at 09:12

## 2022-12-01 RX ADMIN — METOPROLOL TARTRATE 50 MG: 50 TABLET, FILM COATED ORAL at 09:12

## 2022-12-01 RX ADMIN — ESCITALOPRAM OXALATE 20 MG: 10 TABLET ORAL at 09:12

## 2022-12-01 RX ADMIN — PIPERACILLIN SODIUM AND TAZOBACTAM SODIUM 3.38 G: 3; .375 INJECTION, POWDER, LYOPHILIZED, FOR SOLUTION INTRAVENOUS at 03:12

## 2022-12-01 RX ADMIN — POTASSIUM BICARBONATE 35 MEQ: 391 TABLET, EFFERVESCENT ORAL at 05:12

## 2022-12-01 RX ADMIN — HYDROCODONE BITARTRATE AND ACETAMINOPHEN 1 TABLET: 7.5; 325 TABLET ORAL at 01:12

## 2022-12-01 RX ADMIN — ENOXAPARIN SODIUM 60 MG: 60 INJECTION SUBCUTANEOUS at 09:12

## 2022-12-01 RX ADMIN — ONDANSETRON 4 MG: 2 INJECTION INTRAMUSCULAR; INTRAVENOUS at 08:12

## 2022-12-01 RX ADMIN — PANTOPRAZOLE SODIUM 40 MG: 40 INJECTION, POWDER, LYOPHILIZED, FOR SOLUTION INTRAVENOUS at 01:12

## 2022-12-01 RX ADMIN — HYDROCODONE BITARTRATE AND ACETAMINOPHEN 1 TABLET: 7.5; 325 TABLET ORAL at 07:12

## 2022-12-01 RX ADMIN — DOCUSATE SODIUM 100 MG: 100 CAPSULE, LIQUID FILLED ORAL at 08:12

## 2022-12-01 RX ADMIN — METOPROLOL TARTRATE 2.5 MG: 1 INJECTION, SOLUTION INTRAVENOUS at 08:12

## 2022-12-01 RX ADMIN — AMIODARONE HYDROCHLORIDE 200 MG: 200 TABLET ORAL at 08:12

## 2022-12-01 RX ADMIN — HYDROCODONE BITARTRATE AND ACETAMINOPHEN 1 TABLET: 5; 325 TABLET ORAL at 08:12

## 2022-12-01 RX ADMIN — AMLODIPINE BESYLATE 10 MG: 5 TABLET ORAL at 01:12

## 2022-12-01 RX ADMIN — SODIUM CHLORIDE: 0.9 INJECTION, SOLUTION INTRAVENOUS at 01:12

## 2022-12-01 RX ADMIN — LISINOPRIL 20 MG: 20 TABLET ORAL at 01:12

## 2022-12-01 RX ADMIN — PANTOPRAZOLE SODIUM 40 MG: 40 INJECTION, POWDER, LYOPHILIZED, FOR SOLUTION INTRAVENOUS at 08:12

## 2022-12-01 RX ADMIN — PIPERACILLIN SODIUM AND TAZOBACTAM SODIUM 3.38 G: 3; .375 INJECTION, POWDER, LYOPHILIZED, FOR SOLUTION INTRAVENOUS at 05:12

## 2022-12-01 RX ADMIN — MELATONIN 6 MG: at 03:12

## 2022-12-01 RX ADMIN — MAGNESIUM SULFATE HEPTAHYDRATE 2 G: 40 INJECTION, SOLUTION INTRAVENOUS at 01:12

## 2022-12-01 RX ADMIN — METOPROLOL TARTRATE 50 MG: 50 TABLET, FILM COATED ORAL at 04:12

## 2022-12-01 RX ADMIN — PIPERACILLIN SODIUM AND TAZOBACTAM SODIUM 3.38 G: 3; .375 INJECTION, POWDER, LYOPHILIZED, FOR SOLUTION INTRAVENOUS at 11:12

## 2022-12-01 RX ADMIN — ENOXAPARIN SODIUM 60 MG: 60 INJECTION SUBCUTANEOUS at 08:12

## 2022-12-01 RX ADMIN — LACTULOSE 30 G: 20 SOLUTION ORAL at 09:12

## 2022-12-01 NOTE — PLAN OF CARE
Problem: Infection  Goal: Absence of Infection Signs and Symptoms  Outcome: Ongoing, Progressing     Problem: Adult Inpatient Plan of Care  Goal: Plan of Care Review  Outcome: Ongoing, Progressing  Goal: Patient-Specific Goal (Individualized)  Outcome: Ongoing, Progressing  Goal: Absence of Hospital-Acquired Illness or Injury  Outcome: Ongoing, Progressing  Goal: Optimal Comfort and Wellbeing  Outcome: Ongoing, Progressing  Goal: Readiness for Transition of Care  Outcome: Ongoing, Progressing     Problem: Adjustment to Illness (Sepsis/Septic Shock)  Goal: Optimal Coping  Outcome: Ongoing, Progressing     Problem: Bleeding (Sepsis/Septic Shock)  Goal: Absence of Bleeding  Outcome: Ongoing, Progressing     Problem: Glycemic Control Impaired (Sepsis/Septic Shock)  Goal: Blood Glucose Level Within Desired Range  Outcome: Ongoing, Progressing     Problem: Infection Progression (Sepsis/Septic Shock)  Goal: Absence of Infection Signs and Symptoms  Outcome: Ongoing, Progressing     Problem: Nutrition Impaired (Sepsis/Septic Shock)  Goal: Optimal Nutrition Intake  Outcome: Ongoing, Progressing     Problem: Fluid and Electrolyte Imbalance (Acute Kidney Injury/Impairment)  Goal: Fluid and Electrolyte Balance  Outcome: Ongoing, Progressing     Problem: Oral Intake Inadequate (Acute Kidney Injury/Impairment)  Goal: Optimal Nutrition Intake  Outcome: Ongoing, Progressing     Problem: Renal Function Impairment (Acute Kidney Injury/Impairment)  Goal: Effective Renal Function  Outcome: Ongoing, Progressing     Problem: Skin Injury Risk Increased  Goal: Skin Health and Integrity  Outcome: Ongoing, Progressing

## 2022-12-01 NOTE — ED PROVIDER NOTES
Encounter Date: 11/30/2022       History     Chief Complaint   Patient presents with    Altered Mental Status     X 2 Days     This is a 77-year-old female with a history of hypertension, atrial fibrillation, diverticulitis, chronic lower back pain who presents secondary to an altered mental status.  The patient has had body aches chills and over the past 2 days.  She has had occasional diarrhea.  Her son reports she is had symptoms of this nature in the past with diverticulitis.  She has been confused over the past 2 days as well.  Her son reports this has happened as well when she is had episodes of colitis.  Her appetite has been decreased.  Her son denies any known vomiting although she has reported nausea.  She is had some rhinorrhea but denies coughing, chest pain or shortness of breath.  Upon initial presentation patient was confused and not able to provide good history.  Her son also reports that he is concerned that she may have accidentally taken some of her 's medications.  The patient pointed to her suprapubic area when asked if she had any pain.      Review of patient's allergies indicates:  No Known Allergies  Past Medical History:   Diagnosis Date    Anemia     Arthritis     Bleeding ulcer 07/2016    Chronic pain     DDD (degenerative disc disease), cervical     DDD (degenerative disc disease), lumbar     Depression     Disc degeneration, lumbosacral     Diverticulitis     Encounter for blood transfusion     Hypertension     IBS (irritable bowel syndrome)     Neuropathy of both feet     Seizures     none  since 2017    Umbilical hernia 2020     Past Surgical History:   Procedure Laterality Date    BACK SURGERY      BREAST BIOPSY      BREAST SURGERY Right     lumpectomy    CAUDAL EPIDURAL STEROID INJECTION N/A 01/28/2022    Procedure: Injection-steroid-epidural-caudal;  Surgeon: Luzmaria Marcelino MD;  Location: [facility];  Service: Pain Management;  Laterality: N/A;    COLONOSCOPY N/A 01/14/2022     Procedure: COLONOSCOPY;  Surgeon: Abby Landers MD;  Location: [facility] ENDO;  Service: Endoscopy;  Laterality: N/A;    ENDOSCOPIC ULTRASOUND OF UPPER GASTROINTESTINAL TRACT N/A 07/21/2020    Procedure: ULTRASOUND, UPPER GI TRACT, ENDOSCOPIC;  Surgeon: Marcio Nguyễn III, MD;  Location: [facility] ENDO;  Service: Endoscopy;  Laterality: N/A;    ESOPHAGOGASTRODUODENOSCOPY N/A 01/14/2022    Procedure: EGD (ESOPHAGOGASTRODUODENOSCOPY);  Surgeon: Abby Landers MD;  Location: [facility] ENDO;  Service: Endoscopy;  Laterality: N/A;    ESOPHAGOGASTRODUODENOSCOPY N/A 06/10/2022    Procedure: EGD (ESOPHAGOGASTRODUODENOSCOPY);  Surgeon: Abby Landers MD;  Location: [facility];  Service: Endoscopy;  Laterality: N/A;    EYE SURGERY      cataract    HYSTERECTOMY      INTRAMEDULLARY RODDING OF TROCHANTER OF FEMUR Left 12/11/2018    Procedure: INSERTION, INTRAMEDULLARY SANTOS, FEMUR, TROCHANTER;  Surgeon: Eulalio De La Cruz MD;  Location: [facility];  Service: Orthopedics;  Laterality: Left;    SPINAL CORD STIMULATOR IMPLANT  09/18/2013    and removal    SPINE SURGERY  2006    lumbar L2-S1 decompression.    SPINE SURGERY      cervical decompression    TONSILLECTOMY       Family History   Problem Relation Age of Onset    Diabetes Mother     Hypertension Mother     Irritable bowel syndrome Mother     Diabetes Father         insulin dependent    Hypertension Father     Heart disease Father     Coronary artery disease Father     Depression Father     Diabetes Sister     Diabetes Brother     COPD Brother      Social History     Tobacco Use    Smoking status: Never    Smokeless tobacco: Never   Substance Use Topics    Alcohol use: No    Drug use: No     Review of Systems   Unable to perform ROS: Mental status change     Physical Exam     Initial Vitals [11/30/22 1753]   BP Pulse Resp Temp SpO2   (!) 139/90 98 20 98.4 °F (36.9 °C) 98 %      MAP       --         Physical Exam    Nursing note and vitals reviewed.  Constitutional: She is not  diaphoretic. She does not appear ill. No distress.   Alert but confused, non ill-appearing.   HENT:   Head: Normocephalic and atraumatic.   Right Ear: Tympanic membrane normal.   Left Ear: Tympanic membrane normal.   Nose: Nose normal.   Mouth/Throat: Uvula is midline, oropharynx is clear and moist and mucous membranes are normal. No oral lesions. No uvula swelling. No oropharyngeal exudate, posterior oropharyngeal edema or posterior oropharyngeal erythema.   Eyes: Conjunctivae, EOM and lids are normal. Pupils are equal, round, and reactive to light. No scleral icterus.   Neck: Trachea normal and phonation normal. Neck supple. No thyroid mass and no thyromegaly present. No stridor present. No JVD present.   Normal range of motion.   Full passive range of motion without pain.     Cardiovascular:  Normal rate, normal heart sounds, intact distal pulses and normal pulses. An irregularly irregular rhythm present.     Exam reveals no gallop, no distant heart sounds, no friction rub and no decreased pulses.       No murmur heard.  Pulmonary/Chest: Effort normal and breath sounds normal. No accessory muscle usage or stridor. No tachypnea. No respiratory distress. She has no wheezes. She has no rhonchi. She has no rales.   Abdominal: Abdomen is soft. Bowel sounds are normal. She exhibits no distension, no pulsatile midline mass and no mass. There is no abdominal tenderness. There is no rigidity and no guarding.   Genitourinary:    Rectum normal.   Rectum:      No rectal mass, external hemorrhoid or abnormal anal tone.      Genitourinary Comments: Normal perianal sensation, normal rectal tone     Musculoskeletal:         General: No tenderness or edema. Normal range of motion.      Right hand: Normal. Normal range of motion. Normal strength. Normal sensation. Normal capillary refill. Normal pulse.      Left hand: Normal. Normal range of motion. Normal strength. Normal sensation. Normal capillary refill. Normal pulse.       Cervical back: Normal, full passive range of motion without pain, normal range of motion and neck supple. No edema, erythema, rigidity or bony tenderness. No spinous process tenderness or muscular tenderness. Normal range of motion.      Thoracic back: Normal. No bony tenderness. Normal range of motion.      Lumbar back: Normal. No bony tenderness. Normal range of motion.      Right foot: Normal. Normal range of motion and normal capillary refill. No tenderness or bony tenderness. Normal pulse.      Left foot: Normal. Normal range of motion and normal capillary refill. No tenderness or bony tenderness. Normal pulse.      Comments: Pulses are 2+ throughout, cap refill is less than 2 sec throughout, extremities are nontender throughout with full range of motion. There is no spinal tenderness to palpation.     Neurological: She is alert and oriented to person, place, and time. She has normal strength. She displays normal reflexes. No cranial nerve deficit or sensory deficit. Gait normal.   No focal deficits.   Skin: Skin is warm, dry and intact. Capillary refill takes less than 2 seconds. No ecchymosis, no petechiae and no rash noted. No erythema. No pallor.   Psychiatric: She has a normal mood and affect. Her speech is normal and behavior is normal. Judgment and thought content normal. Cognition and memory are normal.       ED Course   Procedures  Labs Reviewed   CBC W/ AUTO DIFFERENTIAL - Abnormal; Notable for the following components:       Result Value    RBC 3.81 (*)     Hemoglobin 11.5 (*)     Hematocrit 35.5 (*)     Platelets 492 (*)     All other components within normal limits   COMPREHENSIVE METABOLIC PANEL - Abnormal; Notable for the following components:    Sodium 131 (*)     All other components within normal limits   URINALYSIS, REFLEX TO URINE CULTURE - Abnormal; Notable for the following components:    Protein, UA 2+ (*)     Ketones, UA 3+ (*)     Bilirubin (UA) 2+ (*)     All other components within  normal limits    Narrative:     Specimen Source->Urine   B-TYPE NATRIURETIC PEPTIDE - Abnormal; Notable for the following components:     (*)     All other components within normal limits   URINALYSIS MICROSCOPIC - Abnormal; Notable for the following components:    Hyaline Casts, UA 2 (*)     All other components within normal limits    Narrative:     Specimen Source->Urine   CULTURE, BLOOD    Narrative:     Aerobic and anaerobic   RESPIRATORY INFECTION PANEL (PCR), NASOPHARYNGEAL    Narrative:     Respiratory Infection Panel source->NP Swab   CULTURE, STOOL   CULTURE, RESPIRATORY   LACTIC ACID, PLASMA   INFLUENZA A AND B ANTIGEN    Narrative:     Specimen Source->Nasopharyngeal Swab   MAGNESIUM   APTT   PROTIME-INR   LIPASE   TROPONIN I HIGH SENSITIVITY   SARS-COV-2 RNA AMPLIFICATION, QUAL   PROCALCITONIN   PROCALCITONIN   WBC, STOOL        ECG Results              EKG 12-lead (In process)  Result time 12/01/22 08:35:54      In process by Interface, Lab In [facility] (12/01/22 08:35:54)                   Narrative:    Test Reason : R50.9,    Vent. Rate : 095 BPM     Atrial Rate : 095 BPM     P-R Int : 172 ms          QRS Dur : 082 ms      QT Int : 382 ms       P-R-T Axes : 053 000 042 degrees     QTc Int : 480 ms    Normal sinus rhythm  Normal ECG  When compared with ECG of 16-JUN-2022 09:26,  Sinus rhythm has replaced Atrial fibrillation  Vent. rate has decreased BY  73 BPM  T wave inversion no longer evident in Lateral leads    Referred By: [email or web address]   SELF           Confirmed By:                                   Imaging Results              CT Abdomen Pelvis With Contrast (Final result)  Result time 11/30/22 20:45:57      Final result by Vic Quiroz MD (11/30/22 20:45:57)                   Narrative:    CT ABDOMEN PELVIS WITH IV CONTRAST    COMPARISONS:  CT abdomen and pelvis dated November 15, 2020.    ADDITIONAL PERTINENT HISTORY:   Left lower quadrant abdominal pain    TECHNIQUE:   Multiple axial  images were obtained from the lung bases through the lesser trochanters after the adminstration of intravenous contrast.  One of the following dose optimization techniques was utilized in the performance of this exam: Automated exposure control; adjustment of the mA and/or kV according to the patient's size; or use of an iterative  reconstruction technique.  Specific details can be referenced in the facility's radiology CT exam operational policy.    CONTRAST:   100 mL of IV Omnipaque 350.    FINDINGS:  Lung bases: negative    Free air/free fluid: none  Liver:  Mild intrahepatic and extrahepatic ductal dilatation to the level of the duodenum without underlying mass noted within the head of the pancreas. This is similar in appearance to previous exam.  Spleen: negative  Adrenal glands: negative  Kidneys /ureters/urinary bladder: Continued findings of a nonobstructing renal calculus involving the midportion of the left kidney. Otherwise negative.  Pancreas: Mild pancreatic atrophy. This is also similar to previous exam.  Gall Bladder:   Cholelithiasis without evidence of acute cholecystitis.    Bowel / mesentery: There is a solitary loop of small bowel extending into a ventral hernia along the midline of the abdomen without evidence of obstruction. No other acute process noted. Small hiatal hernia noted.    Lymph node assessment: negative    Pelvic contents: Patient status post hysterectomy.  Abdominal vasculature: negative    Surrounding soft tissues: Small ventral hernia along the midline of the abdomen. Otherwise negative  Osseous structures: Patient status post previous ORIF of the proximal left femur. Moderate scoliotic curvature convex to the left centered along the mid lumbar spine alignment. Spondylitic change involving the lower lumbar spine. No acute appearing bony abnormalities.      IMPRESSION:    1. Small ventral hernia with a solitary loop of small bowel extending into this hernia defect without evidence  of bowel obstruction.  2. Continued intra and extrahepatic ductal dilatation without other abnormality noted.  3. No acute intra-abdominal or intrapelvic process.    Electronically signed by:  Vic Quiroz MD  11/30/2022 8:45 PM CST Workstation: [id number]                                     X-Ray Chest AP Portable (Final result)  Result time 11/30/22 19:07:14      Final result by Ronan Neely MD (11/30/22 19:07:14)                   Narrative:    Reason: Sepsis    FINDINGS:    Portable chest with comparison chest x-ray Yanira 15, 2022 show normal cardiomediastinal silhouette.  Lungs are clear. Pulmonary vasculature is normal. No acute osseous abnormality.    IMPRESSION:    No acute cardiopulmonary abnormality.    Electronically signed by:  Ronan Neely DO  11/30/2022 7:07 PM CST Workstation: [id number]                                     CT Head Without Contrast (Final result)  Result time 11/30/22 19:06:39      Final result by Ronan Neely MD (11/30/22 19:06:39)                   Narrative:    CMS MANDATED QUALITY DATA - CT RADIATION - 436    All CT scans at this facility utilize dose modulation, iterative reconstruction, and/or weight based dosing when appropriate to reduce radiation dose to as low as reasonably achievable.        Reason: Mental status change, unknown cause    TECHNIQUE: Head CT without IV contrast.    COMPARISON: None    FINDINGS:    Gray-white differentiation is maintained without hemorrhage, midline shift, or mass effect.    The ventricles and cisterns are maintained.    Calvarium is intact. Visualized sinuses are clear.    IMPRESSION:    No acute intracranial abnormality.        Electronically signed by:  Ronan Neely DO  11/30/2022 7:06 PM CST Workstation: [id number]                                     Medications   piperacillin-tazobactam 3.375 g in dextrose 5 % 50 mL IVPB (ready to mix system) (0 g Intravenous Stopped 12/1/22 5055)   lactulose 20 gram/30 mL solution Soln 30 g (30  g Oral Not Given 12/1/22 1500)   acetaminophen tablet 1,000 mg (1,000 mg Oral Not Given 12/1/22 0721)   metoprolol tartrate (LOPRESSOR) tablet 50 mg (50 mg Oral Not Given 12/1/22 0900)   amiodarone tablet 200 mg (200 mg Oral Given 12/1/22 0856)   docusate sodium capsule 100 mg (100 mg Oral Given 12/1/22 0857)   EScitalopram oxalate tablet 20 mg (20 mg Oral Given 12/1/22 0900)   enoxaparin injection 60 mg (60 mg Subcutaneous Given 12/1/22 0816)   sodium chloride 0.9% flush 10 mL (has no administration in time range)   albuterol-ipratropium 2.5 mg-0.5 mg/3 mL nebulizer solution 3 mL (has no administration in time range)   melatonin tablet 6 mg (6 mg Oral Given 12/1/22 0304)   ondansetron injection 4 mg (4 mg Intravenous Given 12/1/22 0815)   polyethylene glycol packet 17 g (has no administration in time range)   senna-docusate 8.6-50 mg per tablet 1 tablet (has no administration in time range)   simethicone chewable tablet 80 mg (has no administration in time range)   naloxone 0.4 mg/mL injection 0.02 mg (has no administration in time range)   glucose chewable tablet 16 g (has no administration in time range)   glucose chewable tablet 24 g (has no administration in time range)   glucagon (human recombinant) injection 1 mg (has no administration in time range)   dextrose 10% bolus 125 mL (has no administration in time range)   dextrose 10% bolus 250 mL (has no administration in time range)   amLODIPine tablet 10 mg (10 mg Oral Given 12/1/22 1301)   lisinopriL tablet 20 mg (20 mg Oral Given 12/1/22 1301)   HYDROcodone-acetaminophen 7.5-325 mg per tablet 1 tablet (1 tablet Oral Given 12/1/22 1301)   potassium bicarbonate disintegrating tablet 50 mEq (has no administration in time range)   potassium bicarbonate disintegrating tablet 35 mEq (has no administration in time range)   potassium bicarbonate disintegrating tablet 60 mEq (has no administration in time range)   magnesium oxide tablet 800 mg (has no administration  in time range)   magnesium oxide tablet 800 mg (has no administration in time range)   potassium, sodium phosphates 280-160-250 mg packet 2 packet (has no administration in time range)   potassium, sodium phosphates 280-160-250 mg packet 2 packet (has no administration in time range)   potassium, sodium phosphates 280-160-250 mg packet 2 packet (has no administration in time range)   pantoprazole EC tablet 40 mg (has no administration in time range)   acetaminophen tablet 1,000 mg (1,000 mg Oral Given 11/30/22 1925)   cefTRIAXone (ROCEPHIN) 2 g/50 mL D5W IVPB (0 g Intravenous Stopped 11/30/22 2113)   iohexoL (OMNIPAQUE 350) injection 100 mL (100 mLs Intravenous Given 11/30/22 2022)   sodium chloride 0.9% bolus 500 mL (0 mLs Intravenous Stopped 12/1/22 0039)   magnesium sulfate 2g in water 50mL IVPB (premix) (0 g Intravenous Stopped 12/1/22 0356)   potassium chloride 10 mEq in 100 mL IVPB (0 mEq Intravenous Stopped 12/1/22 0039)     Medical Decision Making:   Clinical Tests:   Lab Tests: Reviewed  Radiological Study: Reviewed  Medical Tests: Reviewed  ED Management:  Patient given Tylenol upon ED presentation, rectal temperature was assessed--patient had been given Tylenol an hour prior to this, rectal temperature was not elevated at that time however patient's mental status had markedly improved.  She was awake and conversant but still somewhat confused to the year.  She denies any pain or discomfort.  She denies headache, chest pain or shortness of breath.  She denies any current back pain.  She denies any recent cough or cold symptoms.  CT scan of the head negative for acute intracranial abnormality, CT scan of the abdomen negative for colitis/acute intra-abdominal abnormality.  Patient has become somewhat confused again.  Neuro exam is nonfocal.  There is no complaints of headache or meningismus.  Patient is on Eliquis which would prevent lumbar puncture although suspicion for CNS infection is very low based on  "the fact the patient has not had headaches, photophobia and no meningismus on exam.  I have discussed the case with the hospitalist provider who has assumed care and will admit.           Sepsis Perfusion Assessment: "I attest a sepsis perfusion exam was performed within 6 hours of sepsis, severe sepsis, or septic shock presentation, following fluid resuscitation."            Clinical Impression:   Final diagnoses:  [R50.9] Fever  [G93.41] Encephalopathy, metabolic        ED Disposition Condition    Observation                 Nohemy Tolentino MD  12/01/22 4465    "

## 2022-12-01 NOTE — PROGRESS NOTES
[facility] Medicine  Progress Note    Patient name: Froilan Ray  MRN: [id number]  Admit Date: 11/30/2022   LOS: 0 days     SUBJECTIVE:     Principal problem: Encephalopathy, metabolic    Interval History:  77 year old female with PMHx parox afib on Eliquis, Chronic HF, HTN brought in to the ED for not being her self for the past few days.  There were reports of subjective f/c, myalgias, URI Sx, Intermittent loose stool. She was afebrile in the ED.  There was also concern she may have taken her 's medication though this was not specifically witnessed. She was admitted for an encephalopathy workup.  Given the report of subjective F/C, she was empirically started on IV abx.  UA, CXR unrevealing of an infectious source. CT abdomen with small ventral hernia but no obstruction and no infectious focus. She does have intra and extra hepatic ductal dilatation but this appears unchanged when compared to 11/2020 study.  Viral panel did not yield a viral etiology.  Stool studies ordered but have not yet produced a sample.  Abdominal exam is benign.  One of four bottles of blood is growing Gram Positive cocci.  Repeat cultures ordered.  Her  is at bedside and finds she is still not back to herself. Initial head CT was negative for acute process.  I ordered an MRI of the brain to evaluate CVA and this was negative for an acute process.  Sedating home medication held including Flexeril and Lyrica.     Hospital Course:    Scheduled Meds:   amiodarone  200 mg Oral Daily    amLODIPine  10 mg Oral Daily    docusate sodium  100 mg Oral BID    enoxparin  1 mg/kg Subcutaneous BID    EScitalopram oxalate  20 mg Oral Daily    lactulose  30 g Oral TID    lisinopriL  20 mg Oral Daily    metoprolol tartrate  50 mg Oral TID    pantoprazole  40 mg Intravenous Daily    piperacillin-tazobactam (ZOSYN) IVPB  3.375 g Intravenous Q8H     Continuous Infusions:  PRN Meds:acetaminophen, albuterol-ipratropium,  dextrose 10%, dextrose 10%, glucagon (human recombinant), glucose, glucose, HYDROcodone-acetaminophen, magnesium oxide, magnesium oxide, melatonin, naloxone, ondansetron, polyethylene glycol, potassium bicarbonate, potassium bicarbonate, potassium bicarbonate, potassium, sodium phosphates, potassium, sodium phosphates, potassium, sodium phosphates, senna-docusate 8.6-50 mg, simethicone, sodium chloride 0.9%    Review of patient's allergies indicates:  No Known Allergies    Review of Systems: As per interval history    OBJECTIVE:     Vital Signs (Most Recent)  Temp: 98.1 °F (36.7 °C) (12/01/22 1642)  Pulse: 64 (12/01/22 1642)  Resp: 18 (12/01/22 1642)  BP: (!) 162/77 (12/01/22 1642)  SpO2: 96 % (12/01/22 1642)    Vital Signs Range (Last 24H):  Temp:  [97.7 °F (36.5 °C)-98.8 °F (37.1 °C)]   Pulse:  []   Resp:  [13-21]   BP: (139-184)/(76-90)   SpO2:  [94 %-99 %]     I & O (Last 24H):  Intake/Output Summary (Last 24 hours) at 12/1/2022 1647  Last data filed at 12/1/2022 1610  Gross per 24 hour   Intake 2020 ml   Output 2100 ml   Net -80 ml       Physical Exam:    Vitals and nursing note reviewed.     Constitutional:       General: Not in acute distress.     Appearance: Well-developed.   HENT:      Head: Normocephalic and atraumatic.   Eyes:      Pupils: Pupils are equal, round, and reactive to light.   Cardiovascular:      Rate and Rhythm: Regular rhythm.   Pulmonary:      Effort: Pulmonary effort is normal.      Breath sounds: Normal breath sounds. No wheezing.   Abdominal:      General: There is no distension.  Small ventral hernia     Palpations: Abdomen is soft.      Tenderness: There is no abdominal tenderness. There is no guarding or rebound.   Musculoskeletal:         General: Normal range of motion.      Cervical back: Normal range of motion.   Skin:     Findings: No rash.   Neurological:      Mental Status: Alert and oriented to person, hospital, date and year.  She does have some tangential thought  processes and wasn't following my questions at times and continued to go back to topics we had discussed earlier in the visit without context.      Cranial Nerves: No cranial nerve deficit.      Sensory: No sensory deficit.     Laboratory:  I have reviewed all pertinent lab results within the past 24 hours.  CBC:   Recent Labs   Lab 12/01/22  0447   WBC 4.17   RBC 3.67*   HGB 11.0*   HCT 34.2*   *   MCV 93   MCH 30.0   MCHC 32.2     CMP:   Recent Labs   Lab 12/01/22 0447      CALCIUM 8.6*   ALBUMIN 3.3*   PROT 6.5   *   K 3.2*   CO2 22*      BUN 7*   CREATININE 0.4*   ALKPHOS 72   ALT 10   AST 13   BILITOT 0.8     Microbiology Results (last 7 days)       Procedure Component Value Units Date/Time    Blood culture [[id number]]     Order Status: Sent Specimen: Blood     Rapid Organism ID by PCR (from Blood culture) [[id number]]  (Abnormal) Collected: 11/30/22 1500    Order Status: Completed Updated: 12/01/22 1354     Enterococcus faecials Not Detected     Enterococcus faecium Not Detected     Listeria Monocytogenes Not Detected     Staphylococcus spp. See species for ID     Staphylococcus aureus Not Detected     Staphylococcus epidermidis Detected     Staphylococcus lugdunensis Not Detected     Streptococcus species Not Detected     Streptococcus agalactiae Not Detected     Streptococcus pneumoniae Not Detected     Streptococcus pyogenes Not Detected     Acinetobacter calcoaceticus/baumannii complex Not Detected     Bacteroides fragilis Not Detected     Enterobacerales Not Detected     Enterobacter cloacae complex Not Detected     Escherichia Not Detected     Klebsiella aerogenes Not Detected     Klebsiella oxytoca Not Detected     Klebsiella pneumoniae group Not Detected     Proteus Not Detected     Salmonella sp Not Detected     Serratia marcescens Not Detected     Haemophilus influenzae Not Detected     Neisseria meningtidis Not Detected     Pseudomonas aeruginosa Not Detected      Stenotrophomonas maltophilia Not Detected     Candida albicans Not Detected     Candida auris Not Detected     Candida glabrata Not Detected     Candida krusei Not Detected     Candida Parapsilosis Not Detected     Candida Tropicalis Not Detected     Cryptococcus neoformans/gattii Not Detected     CTX-M Gene Test not applicable     IMP Gene Test not applicable     KPC  Test not applicable     mcr-1  Test not applicable     mec A/C Detected     Comment: Result(s) called and verbal readback obtained from Marietta Phillip RN   on 1E, re: mecA/C resistance gene detected 12/1/22 at 1353 vcb by VCB   12/01/2022 13:53          mec A/C and MREJ (MRSA) Test not applicable     NDM Test not applicable     OXA-48-like Test not applicable     van A/B Test not applicable     VIM Test not applicable    Narrative:      Aerobic and anaerobic  Result(s) called and verbal readback obtained from Marietta Phillip RN   on 1E, re: mecA/C resistance gene detected 12/1/22 at 1353 vcb by VCB   12/01/2022 13:53    Blood culture x two cultures. Draw prior to antibiotics. [[id number]] Collected: 11/30/22 1823    Order Status: Completed Specimen: Blood from Peripheral, Antecubital, Left Updated: 12/01/22 1328     Blood Culture, Routine Gram stain aer bottle: Gram positive cocci      Results called to and read back by:JAQUELIN Marie[address] [place] [address];      12/01/2022  13:26 CJD    Narrative:      Aerobic and anaerobic    MRSA Screen by PCR [[id number]]  (Abnormal) Collected: 12/01/22 0550    Order Status: Completed Specimen: Nasopharyngeal Swab from Nasal Updated: 12/01/22 0744     MRSA SCREEN BY PCR Positive     Comment: POS MRSA of Nares critical result(s) called and verbal readback   obtained from TRAY Wood by CD3 12/01/2022 07:44         Narrative:        POS MRSA of Nares critical result(s) called and verbal readback   obtained from TRAY Wood by CD3 12/01/2022 07:44    Blood culture x two cultures. Draw prior to  antibiotics. [[id number]] Collected: 11/30/22 1824    Order Status: Completed Specimen: Blood from Peripheral, Forearm, Right Updated: 12/01/22 0117     Blood Culture, Routine No Growth to date    Narrative:      Aerobic and anaerobic    Respiratory Infection Panel (PCR), Nasopharyngeal [[id number]] Collected: 11/30/22 1827    Order Status: Completed Specimen: Nasopharyngeal Swab Updated: 11/30/22 2329     Respiratory Infection Panel Source NP swab     Adenovirus Not Detected     Coronavirus 229E, Common Cold Virus Not Detected     Coronavirus HKU1, Common Cold Virus Not Detected     Coronavirus NL63, Common Cold Virus Not Detected     Coronavirus OC43, Common Cold Virus Not Detected     Comment: The Coronavirus strains detected in this test cause the common cold.  These strains are not the COVID-19 (novel Coronavirus)strain   associated with the respiratory disease outbreak.          SARS-CoV2 (COVID-19) Qualitative PCR Not Detected     Human Metapneumovirus Not Detected     Human Rhinovirus/Enterovirus Not Detected     Influenza A (subtypes H1, H1-2009,H3) Not Detected     Influenza B Not Detected     Parainfluenza Virus 1 Not Detected     Parainfluenza Virus 2 Not Detected     Parainfluenza Virus 3 Not Detected     Parainfluenza Virus 4 Not Detected     Respiratory Syncytial Virus Not Detected     Bordetella Parapertussis (MX4485) Not Detected     Bordetella pertussis (ptxP) Not Detected     Chlamydia pneumoniae Not Detected     Mycoplasma pneumoniae Not Detected     Comment: Respiratory Infection Panel testing performed by Multiplex PCR.       Narrative:      Respiratory Infection Panel source->NP Swab    Stool culture [[id number]]     Order Status: No result Specimen: Stool     Culture, Respiratory with Gram Stain [[id number]]     Order Status: No result Specimen: Respiratory             Diagnostic Results:  MRI: Reviewed  MRI brain with no acute process    ASSESSMENT/PLAN:         Active Hospital Problems     Diagnosis  POA    *Encephalopathy, metabolic [G93.41]  Yes    HFrEF (heart failure with reduced ejection fraction) [I50.20]  Yes    Anticoagulated [Z79.01]  Not Applicable    Atrial fibrillation [I48.91]  Yes    Drug-induced constipation [K59.03]  Yes    Essential hypertension [I10]  Yes      Resolved Hospital Problems   No resolved problems to display.         Plan:     -Infectious workup underway given reports of fevers and chills at home.  Focus of infection has not been found yet. BCx is growing gram positive but this may be a contaminant.  Repeating BCx today and will follow up speciation. On empiric Zosyn. Afebrile.  Procal normal.  -Head CT in the ED was normal.  Given she still is not back to her baseline per her , I ordered a MRI of the brain to evaluate for CVA.  This was negative for acute ischemia.   -Medication induced?  Hard to say as history is very tangential. Her pain medication has been continued due to chronic back pain but other sedating medication held and will see if this makes a difference.   -I'm checking a TSH and Ammonia level.  Checking B12 level.   -Replacing electrolytes but abnormalities are not so significant that I think they would explain her symptoms.   -Blood pressure is up.  I restarted some home mediation that was initially held.   -DVT Px: full dose lovenox substituted for Eliquis at this time    VTE Risk Mitigation (From admission, onward)           Ordered     enoxaparin injection 60 mg  2 times daily         12/01/22 0029     IP VTE HIGH RISK PATIENT  Once         12/01/22 0029     Place sequential compression device  Until discontinued         12/01/22 0029                      Department Hospital Medicine  [facility]  Armando Anderson MD  Date of service: 12/01/2022

## 2022-12-01 NOTE — H&P
[facility]   History & Physical     Patient Name: Froilan Ray  MRN: [id number]  Admission Date: 11/30/2022  5:45 PM  Attending Physician: Chetna Vasquez MD  Face-to-Face encounter date: 11/30/2022 11:03 PM    Patient information was obtained from patient, past medical records, ER physician, and ER records.     HISTORY OF PRESENT ILLNESS:     Froilan Ray is a 77 y.o. White female   With PMH of pAF on eliquis, CHF, HTN,  who presents with AMS.    Pt unable to contribute much to history  Pt's  is a poor historian.  Most of information below is from pt's son and ER physician:    Onset of confusion 2 days ago  Waxing and waning  Progressively worsening  Temporarily improved with tylenol and IVFs in ER  +subjective fever  +chills  +myalgias  +malaise  +fatigue  No SOB  No cough  No CP  +rhinorrhea  +intermittent diarrhea  Possible suprapubic pain per ER physician     Pt's son found his father's medication on pt's side of the bed  He reports this is very unusual  Pt possibly took her 's medication  Pt also is on sedating medication of her own    REVIEW OF SYSTEMS:     All systems reviewed and are negative except as noted per above:  limited by AMS    PAST MEDICAL HISTORY:     Past Medical History:   Diagnosis Date    Anemia     Arthritis     Bleeding ulcer 07/2016    Chronic pain     DDD (degenerative disc disease), cervical     DDD (degenerative disc disease), lumbar     Depression     Disc degeneration, lumbosacral     Diverticulitis     Encounter for blood transfusion     Hypertension     IBS (irritable bowel syndrome)     Neuropathy of both feet     Seizures     none  since 2017    Umbilical hernia 2020       PAST SURGICAL HISTORY:     Past Surgical History:   Procedure Laterality Date    BACK SURGERY      BREAST BIOPSY      BREAST SURGERY Right     lumpectomy    CAUDAL EPIDURAL STEROID INJECTION N/A 01/28/2022    Procedure: Injection-steroid-epidural-caudal;   Surgeon: Luzmaria Marcelino MD;  Location: [facility] OR;  Service: Pain Management;  Laterality: N/A;    COLONOSCOPY N/A 01/14/2022    Procedure: COLONOSCOPY;  Surgeon: Abby Landers MD;  Location: [facility] ENDO;  Service: Endoscopy;  Laterality: N/A;    ENDOSCOPIC ULTRASOUND OF UPPER GASTROINTESTINAL TRACT N/A 07/21/2020    Procedure: ULTRASOUND, UPPER GI TRACT, ENDOSCOPIC;  Surgeon: Marcio Nguyễn III, MD;  Location: [facility] ENDO;  Service: Endoscopy;  Laterality: N/A;    ESOPHAGOGASTRODUODENOSCOPY N/A 01/14/2022    Procedure: EGD (ESOPHAGOGASTRODUODENOSCOPY);  Surgeon: Abby Landers MD;  Location: [facility] ENDO;  Service: Endoscopy;  Laterality: N/A;    ESOPHAGOGASTRODUODENOSCOPY N/A 06/10/2022    Procedure: EGD (ESOPHAGOGASTRODUODENOSCOPY);  Surgeon: Abby Landers MD;  Location: [facility] ENDO;  Service: Endoscopy;  Laterality: N/A;    EYE SURGERY      cataract    HYSTERECTOMY      INTRAMEDULLARY RODDING OF TROCHANTER OF FEMUR Left 12/11/2018    Procedure: INSERTION, INTRAMEDULLARY SANTOS, FEMUR, TROCHANTER;  Surgeon: Eulalio De La Cruz MD;  Location: [facility] OR;  Service: Orthopedics;  Laterality: Left;    SPINAL CORD STIMULATOR IMPLANT  09/18/2013    and removal    SPINE SURGERY  2006    lumbar L2-S1 decompression.    SPINE SURGERY      cervical decompression    TONSILLECTOMY         ALLERGIES:   Patient has no known allergies.    FAMILY HISTORY:     Family History   Problem Relation Age of Onset    Diabetes Mother     Hypertension Mother     Irritable bowel syndrome Mother     Diabetes Father         insulin dependent    Hypertension Father     Heart disease Father     Coronary artery disease Father     Depression Father     Diabetes Sister     Diabetes Brother     COPD Brother        SOCIAL HISTORY:     Social History     Tobacco Use    Smoking status: Never    Smokeless tobacco: Never   Substance Use Topics    Alcohol use: No        Social History     Substance and Sexual Activity   Sexual Activity Not Currently         HOME MEDICATIONS:     Prior to Admission medications    Medication Sig Start Date End Date Taking? Authorizing Provider   acetaminophen (TYLENOL) 325 MG tablet Take 650 mg by mouth once.    Historical Provider   alendronate (FOSAMAX) 70 MG tablet Take 1 tablet (70 mg total) by mouth every 7 days. 7/18/22   Alphonse Boothe III, MD   amiodarone (PACERONE) 200 MG Tab Take 1 tablet (200 mg total) by mouth once daily. 6/21/22 6/21/23  Tamara Vargas MD   amlodipine-benazepril 5-20 mg (LOTREL) 5-20 mg per capsule TAKE 1 CAPSULE BY MOUTH EVERY DAY 10/11/22   Alphonse Boothe III, MD   apixaban (ELIQUIS) 2.5 mg Tab Take 1 tablet (2.5 mg total) by mouth 2 (two) times daily. 6/20/22   Tamara Vargas MD   ascorbic acid, vitamin C, (VITAMIN C) 250 MG tablet Take 1 tablet (250 mg total) by mouth once daily. 1/15/22   Sammie Tamez NP   cyanocobalamin (VITAMIN B-12) 100 MCG tablet Take 1 tablet (100 mcg total) by mouth once daily. 1/15/22   Sammie Tamez NP   cyclobenzaprine (FLEXERIL) 10 MG tablet TAKE 1 TABLET(10 MG) BY MOUTH THREE TIMES DAILY AS NEEDED FOR MUSCLE SPASMS 11/14/22   Luzmaria Marcelino MD   docusate sodium (COLACE) 100 MG capsule Take 1 capsule (100 mg total) by mouth 2 (two) times daily. 6/20/22   Tamara Vargas MD   EScitalopram oxalate (LEXAPRO) 20 MG tablet Take 1 tablet (20 mg total) by mouth once daily. 10/4/22   Alphonse Boothe III, MD   ferrous sulfate 325 (65 FE) MG EC tablet Take 1 tablet (325 mg total) by mouth once daily. 6/30/22   Alphonse Boothe III, MD   furosemide (LASIX) 40 MG tablet Take 1 tablet (40 mg total) by mouth daily as needed (swelling). 6/30/22   Alphonse Boothe III, MD   metoprolol tartrate (LOPRESSOR) 50 MG tablet Take 1 tablet (50 mg total) by mouth 3 (three) times daily. 6/20/22 6/20/23  Tamara Vargas MD   omeprazole (PRILOSEC) 40 MG capsule Take 1 capsule (40 mg total) by mouth every morning. 9/6/22   Abby Landers MD   oxyCODONE-acetaminophen (PERCOCET)  " mg per tablet Take one tablet by mouth every 4 to 6 hours as needed for pain 10/27/22   Luzmaria Marcelino MD   polyethylene glycol (GLYCOLAX) 17 gram PwPk Take 17 g by mouth 2 (two) times daily. 6/20/22   Tamara Vargas MD   potassium chloride (KLOR-CON) 10 MEQ TbSR Take 1 tablet (10 mEq total) by mouth once daily. 6/30/22   Alphonse Boothe III, MD   pregabalin (LYRICA) 200 MG Cap TAKE 1 CAPSULE(200 MG) BY MOUTH THREE TIMES DAILY 10/14/22   Luzmaria Marcelino MD   triamcinolone acetonide 0.1% (KENALOG) 0.1 % ointment Apply topically 2 (two) times daily. 9/14/22   Karina Birmingham NP   EScitalopram oxalate (LEXAPRO) 20 MG tablet Take 1 tablet (20 mg total) by mouth once daily. 10/4/22 11/30/22  Alphonse Boothe III, MD   oxyCODONE-acetaminophen (PERCOCET)  mg per tablet Take one tablet by mouth every 4 to 6 hours as needed for pain 11/25/22 11/30/22  Luzmaria Marcelino MD   oxyCODONE-acetaminophen (PERCOCET)  mg per tablet Take one tablet by mouth every 4 to 6 hours as needed for pain 12/24/22 11/30/22  Luzmaria Marcelino MD   pantoprazole (PROTONIX) 40 MG tablet Take 1 tablet (40 mg total) by mouth 2 (two) times daily. 1/15/22 6/10/22  Sammie Tamez NP       PHYSICAL EXAM:     BP (!) 157/80   Pulse 93   Temp 98.8 °F (37.1 °C) (Rectal)   Resp 18   Ht 5' 6" (1.676 m)   Wt 63.5 kg (140 lb)   SpO2 95%   BMI 22.60 kg/m²     Gen: alert, responsive  HEENT:  Eyes - no pallor  External ears with no lesions  Nares patent  Mouth - lips chapped  CV: iRRR  Lungs: CTA B/L  Abd: +BS, soft, +generalized TTP without rebound or guarding, ND  Ext: no atrophy or edema  Skin: warm, dry  Neuro: alert, confused  Psych: pleasant     LABS AND IMAGING:     Labs Reviewed   CBC W/ AUTO DIFFERENTIAL - Abnormal; Notable for the following components:       Result Value    RBC 3.81 (*)     Hemoglobin 11.5 (*)     Hematocrit 35.5 (*)     Platelets 492 (*)     All other components within normal limits   COMPREHENSIVE METABOLIC " PANEL - Abnormal; Notable for the following components:    Sodium 131 (*)     All other components within normal limits   URINALYSIS, REFLEX TO URINE CULTURE - Abnormal; Notable for the following components:    Protein, UA 2+ (*)     Ketones, UA 3+ (*)     Bilirubin (UA) 2+ (*)     All other components within normal limits    Narrative:     Specimen Source->Urine   B-TYPE NATRIURETIC PEPTIDE - Abnormal; Notable for the following components:     (*)     All other components within normal limits   URINALYSIS MICROSCOPIC - Abnormal; Notable for the following components:    Hyaline Casts, UA 2 (*)     All other components within normal limits    Narrative:     Specimen Source->Urine   RESPIRATORY INFECTION PANEL (PCR), NASOPHARYNGEAL    Narrative:     Respiratory Infection Panel source->NP Swab   CULTURE, BLOOD   CULTURE, BLOOD   CULTURE, STOOL   CULTURE, RESPIRATORY   MRSA SCREEN BY PCR   LACTIC ACID, PLASMA   INFLUENZA A AND B ANTIGEN    Narrative:     Specimen Source->Nasopharyngeal Swab   MAGNESIUM   APTT   PROTIME-INR   LIPASE   TROPONIN I HIGH SENSITIVITY   SARS-COV-2 RNA AMPLIFICATION, QUAL   PROCALCITONIN   INFLUENZA A AND B ANTIGEN   WBC, STOOL   TROPONIN I HIGH SENSITIVITY       Imaging Results              CT Abdomen Pelvis With Contrast (Final result)  Result time 11/30/22 20:45:57      Final result by Vic Quiroz MD (11/30/22 20:45:57)                   Narrative:    CT ABDOMEN PELVIS WITH IV CONTRAST    COMPARISONS:  CT abdomen and pelvis dated November 15, 2020.    ADDITIONAL PERTINENT HISTORY:   Left lower quadrant abdominal pain    TECHNIQUE:   Multiple axial images were obtained from the lung bases through the lesser trochanters after the adminstration of intravenous contrast.  One of the following dose optimization techniques was utilized in the performance of this exam: Automated exposure control; adjustment of the mA and/or kV according to the patient's size; or use of an iterative   reconstruction technique.  Specific details can be referenced in the facility's radiology CT exam operational policy.    CONTRAST:   100 mL of IV Omnipaque 350.    FINDINGS:  Lung bases: negative    Free air/free fluid: none  Liver:  Mild intrahepatic and extrahepatic ductal dilatation to the level of the duodenum without underlying mass noted within the head of the pancreas. This is similar in appearance to previous exam.  Spleen: negative  Adrenal glands: negative  Kidneys /ureters/urinary bladder: Continued findings of a nonobstructing renal calculus involving the midportion of the left kidney. Otherwise negative.  Pancreas: Mild pancreatic atrophy. This is also similar to previous exam.  Gall Bladder:   Cholelithiasis without evidence of acute cholecystitis.    Bowel / mesentery: There is a solitary loop of small bowel extending into a ventral hernia along the midline of the abdomen without evidence of obstruction. No other acute process noted. Small hiatal hernia noted.    Lymph node assessment: negative    Pelvic contents: Patient status post hysterectomy.  Abdominal vasculature: negative    Surrounding soft tissues: Small ventral hernia along the midline of the abdomen. Otherwise negative  Osseous structures: Patient status post previous ORIF of the proximal left femur. Moderate scoliotic curvature convex to the left centered along the mid lumbar spine alignment. Spondylitic change involving the lower lumbar spine. No acute appearing bony abnormalities.      IMPRESSION:    1. Small ventral hernia with a solitary loop of small bowel extending into this hernia defect without evidence of bowel obstruction.  2. Continued intra and extrahepatic ductal dilatation without other abnormality noted.  3. No acute intra-abdominal or intrapelvic process.    Electronically signed by:  Vic Quiroz MD  11/30/2022 8:45 PM CST Workstation: [id number]                                     X-Ray Chest AP Portable (Final result)   Result time 11/30/22 19:07:14      Final result by Ronan Neely MD (11/30/22 19:07:14)                   Narrative:    Reason: Sepsis    FINDINGS:    Portable chest with comparison chest x-ray Yanira 15, 2022 show normal cardiomediastinal silhouette.  Lungs are clear. Pulmonary vasculature is normal. No acute osseous abnormality.    IMPRESSION:    No acute cardiopulmonary abnormality.    Electronically signed by:  Ronan Neely DO  11/30/2022 7:07 PM CST Workstation: [id number]                                     CT Head Without Contrast (Final result)  Result time 11/30/22 19:06:39      Final result by Ronan Neely MD (11/30/22 19:06:39)                   Narrative:    CMS MANDATED QUALITY DATA - CT RADIATION - 436    All CT scans at this facility utilize dose modulation, iterative reconstruction, and/or weight based dosing when appropriate to reduce radiation dose to as low as reasonably achievable.        Reason: Mental status change, unknown cause    TECHNIQUE: Head CT without IV contrast.    COMPARISON: None    FINDINGS:    Gray-white differentiation is maintained without hemorrhage, midline shift, or mass effect.    The ventricles and cisterns are maintained.    Calvarium is intact. Visualized sinuses are clear.    IMPRESSION:    No acute intracranial abnormality.        Electronically signed by:  Ronan Neely DO  11/30/2022 7:06 PM CST Workstation: [id number]                                    ASSESSMENT & PLAN:   Froilan Ray is a 77 y.o. female admitted for    Active Hospital Problems    Diagnosis  POA    HFrEF (heart failure with reduced ejection fraction) [I50.20]  Yes    Anticoagulated [Z79.01]  Not Applicable    Atrial fibrillation [I48.91]  Yes    Encephalopathy, metabolic [G93.41]  Yes    Drug-induced constipation [K59.03]  Yes    Essential hypertension [I10]  Yes      Resolved Hospital Problems   No resolved problems to display.        Plan    Metabolic Encephalopathy  Concern for  sepsis with fever and AMS  - continue IVFs  - infection workup in progress, panculture  - empiric zosyn  - constipation noted on CT:  start lactulose  - hold all sedating medications    Chronic conditions as noted above/below; home medications reviewed personally by me and restarted as appropriate  Electrolyte derangement:  Trending BMP; Mg; replacement prn  DVT ppx: lovenox    Chetna Vasquez MD  [facility] Hospitalist  11/30/2022

## 2022-12-02 VITALS
TEMPERATURE: 97 F | HEIGHT: 66 IN | RESPIRATION RATE: 18 BRPM | OXYGEN SATURATION: 93 % | DIASTOLIC BLOOD PRESSURE: 74 MMHG | SYSTOLIC BLOOD PRESSURE: 121 MMHG | HEART RATE: 105 BPM | BODY MASS INDEX: 22.5 KG/M2 | WEIGHT: 140 LBS

## 2022-12-02 PROBLEM — G93.41 ENCEPHALOPATHY, METABOLIC: Status: RESOLVED | Noted: 2022-06-15 | Resolved: 2022-12-02

## 2022-12-02 LAB
ALBUMIN SERPL BCP-MCNC: 3.7 G/DL (ref 3.5–5.2)
ALP SERPL-CCNC: 78 U/L (ref 55–135)
ALT SERPL W/O P-5'-P-CCNC: 14 U/L (ref 10–44)
AMMONIA PLAS-SCNC: 25 UMOL/L (ref 10–50)
ANION GAP SERPL CALC-SCNC: 11 MMOL/L (ref 8–16)
AST SERPL-CCNC: 22 U/L (ref 10–40)
BACTERIA BLD CULT: ABNORMAL
BASOPHILS # BLD AUTO: 0.06 K/UL (ref 0–0.2)
BASOPHILS NFR BLD: 1.7 % (ref 0–1.9)
BILIRUB SERPL-MCNC: 0.5 MG/DL (ref 0.1–1)
BUN SERPL-MCNC: 8 MG/DL (ref 8–23)
CALCIUM SERPL-MCNC: 9.1 MG/DL (ref 8.7–10.5)
CHLORIDE SERPL-SCNC: 103 MMOL/L (ref 95–110)
CO2 SERPL-SCNC: 20 MMOL/L (ref 23–29)
CREAT SERPL-MCNC: 0.5 MG/DL (ref 0.5–1.4)
DIFFERENTIAL METHOD: ABNORMAL
EOSINOPHIL # BLD AUTO: 0 K/UL (ref 0–0.5)
EOSINOPHIL NFR BLD: 1.1 % (ref 0–8)
ERYTHROCYTE [DISTWIDTH] IN BLOOD BY AUTOMATED COUNT: 13.1 % (ref 11.5–14.5)
EST. GFR  (NO RACE VARIABLE): >60 ML/MIN/1.73 M^2
GLUCOSE SERPL-MCNC: 125 MG/DL (ref 70–110)
HCT VFR BLD AUTO: 37.9 % (ref 37–48.5)
HGB BLD-MCNC: 11.9 G/DL (ref 12–16)
IMM GRANULOCYTES # BLD AUTO: 0.01 K/UL (ref 0–0.04)
IMM GRANULOCYTES NFR BLD AUTO: 0.3 % (ref 0–0.5)
LYMPHOCYTES # BLD AUTO: 1.2 K/UL (ref 1–4.8)
LYMPHOCYTES NFR BLD: 35 % (ref 18–48)
MAGNESIUM SERPL-MCNC: 1.9 MG/DL (ref 1.6–2.6)
MCH RBC QN AUTO: 30 PG (ref 27–31)
MCHC RBC AUTO-ENTMCNC: 31.4 G/DL (ref 32–36)
MCV RBC AUTO: 96 FL (ref 82–98)
MONOCYTES # BLD AUTO: 0.3 K/UL (ref 0.3–1)
MONOCYTES NFR BLD: 9.3 % (ref 4–15)
NEUTROPHILS # BLD AUTO: 1.9 K/UL (ref 1.8–7.7)
NEUTROPHILS NFR BLD: 52.6 % (ref 38–73)
NRBC BLD-RTO: 0 /100 WBC
PLATELET # BLD AUTO: 447 K/UL (ref 150–450)
PMV BLD AUTO: 9.1 FL (ref 9.2–12.9)
POTASSIUM SERPL-SCNC: 3.2 MMOL/L (ref 3.5–5.1)
PROT SERPL-MCNC: 7.4 G/DL (ref 6–8.4)
RBC # BLD AUTO: 3.97 M/UL (ref 4–5.4)
SODIUM SERPL-SCNC: 134 MMOL/L (ref 136–145)
T4 FREE SERPL-MCNC: 0.85 NG/DL (ref 0.71–1.51)
TSH SERPL DL<=0.005 MIU/L-ACNC: 0.24 UIU/ML (ref 0.34–5.6)
VIT B12 SERPL-MCNC: 594 PG/ML (ref 210–950)
WBC # BLD AUTO: 3.54 K/UL (ref 3.9–12.7)

## 2022-12-02 PROCEDURE — 83735 ASSAY OF MAGNESIUM: CPT | Performed by: FAMILY MEDICINE

## 2022-12-02 PROCEDURE — 84443 ASSAY THYROID STIM HORMONE: CPT | Performed by: INTERNAL MEDICINE

## 2022-12-02 PROCEDURE — 63600175 PHARM REV CODE 636 W HCPCS: Performed by: FAMILY MEDICINE

## 2022-12-02 PROCEDURE — 82140 ASSAY OF AMMONIA: CPT | Performed by: INTERNAL MEDICINE

## 2022-12-02 PROCEDURE — 25000003 PHARM REV CODE 250: Performed by: FAMILY MEDICINE

## 2022-12-02 PROCEDURE — 96372 THER/PROPH/DIAG INJ SC/IM: CPT | Performed by: FAMILY MEDICINE

## 2022-12-02 PROCEDURE — 96366 THER/PROPH/DIAG IV INF ADDON: CPT

## 2022-12-02 PROCEDURE — 85025 COMPLETE CBC W/AUTO DIFF WBC: CPT | Performed by: FAMILY MEDICINE

## 2022-12-02 PROCEDURE — 96376 TX/PRO/DX INJ SAME DRUG ADON: CPT

## 2022-12-02 PROCEDURE — 25000003 PHARM REV CODE 250: Performed by: INTERNAL MEDICINE

## 2022-12-02 PROCEDURE — G0378 HOSPITAL OBSERVATION PER HR: HCPCS

## 2022-12-02 PROCEDURE — 84439 ASSAY OF FREE THYROXINE: CPT | Performed by: INTERNAL MEDICINE

## 2022-12-02 PROCEDURE — 82607 VITAMIN B-12: CPT | Performed by: INTERNAL MEDICINE

## 2022-12-02 PROCEDURE — 36415 COLL VENOUS BLD VENIPUNCTURE: CPT | Performed by: FAMILY MEDICINE

## 2022-12-02 PROCEDURE — 80053 COMPREHEN METABOLIC PANEL: CPT | Performed by: FAMILY MEDICINE

## 2022-12-02 RX ORDER — AMOXICILLIN AND CLAVULANATE POTASSIUM 875; 125 MG/1; MG/1
1 TABLET, FILM COATED ORAL 2 TIMES DAILY
Qty: 8 TABLET | Refills: 0 | Status: SHIPPED | OUTPATIENT
Start: 2022-12-02 | End: 2023-05-16

## 2022-12-02 RX ORDER — LISINOPRIL 20 MG/1
40 TABLET ORAL DAILY
Status: DISCONTINUED | OUTPATIENT
Start: 2022-12-02 | End: 2022-12-02 | Stop reason: HOSPADM

## 2022-12-02 RX ADMIN — AMLODIPINE BESYLATE 10 MG: 5 TABLET ORAL at 08:12

## 2022-12-02 RX ADMIN — ENOXAPARIN SODIUM 60 MG: 60 INJECTION SUBCUTANEOUS at 08:12

## 2022-12-02 RX ADMIN — METOPROLOL TARTRATE 50 MG: 50 TABLET, FILM COATED ORAL at 08:12

## 2022-12-02 RX ADMIN — LISINOPRIL 40 MG: 20 TABLET ORAL at 08:12

## 2022-12-02 RX ADMIN — ONDANSETRON 4 MG: 2 INJECTION INTRAMUSCULAR; INTRAVENOUS at 08:12

## 2022-12-02 RX ADMIN — ESCITALOPRAM OXALATE 20 MG: 10 TABLET ORAL at 08:12

## 2022-12-02 RX ADMIN — PANTOPRAZOLE SODIUM 40 MG: 40 TABLET, DELAYED RELEASE ORAL at 06:12

## 2022-12-02 RX ADMIN — AMIODARONE HYDROCHLORIDE 200 MG: 200 TABLET ORAL at 08:12

## 2022-12-02 RX ADMIN — PIPERACILLIN SODIUM AND TAZOBACTAM SODIUM 3.38 G: 3; .375 INJECTION, POWDER, LYOPHILIZED, FOR SOLUTION INTRAVENOUS at 06:12

## 2022-12-02 RX ADMIN — HYDROCODONE BITARTRATE AND ACETAMINOPHEN 1 TABLET: 7.5; 325 TABLET ORAL at 12:12

## 2022-12-02 RX ADMIN — HYDROCODONE BITARTRATE AND ACETAMINOPHEN 1 TABLET: 7.5; 325 TABLET ORAL at 04:12

## 2022-12-02 NOTE — PLAN OF CARE
12/02/22 1502   Final Note   Assessment Type Final Discharge Note   Anticipated Discharge Disposition Home   What phone number can be called within the next 1-3 days to see how you are doing after discharge? [phone]   Hospital Resources/Appts/Education Provided Provided patient/caregiver with written discharge plan information;Appointments scheduled and added to AVS     Discharge orders and chart reviewed. No other discharge needs noted at this time. Pt is clear for discharge from case management. Pt is discharging to home.     PCP appointment scheduled and added to patient's AVS.

## 2022-12-02 NOTE — PLAN OF CARE
met with Pt at bedside to complete MOON. Pt was explained MARC. Pt verbalized understanding of MARC and signed. MARC scanned to .       12/02/22 1155   MARC Message   Medicare Outpatient and Observation Notification regarding financial responsibility Given to patient/caregiver;Explained to patient/caregiver;Signed/date by patient/caregiver   Date MARC was signed 12/02/22   Time MARC was signed [id number]

## 2022-12-02 NOTE — PLAN OF CARE
[facility]  Initial Discharge Assessment       Primary Care Provider: Alphonse Boothe III, MD    Admission Diagnosis: Encephalopathy, metabolic [G93.41]    Admission Date: 11/30/2022  Expected Discharge Date: 12/2/2022     met with Pt at bedside to complete discharge assessment. Pt's spouse (Otoniel Ray (Spouse) [phone] (Mobile)) also present at time and volunteered to participate. Pt AAOx4s. Demographics, PCP, and insurance verified. No home health. No dialysis. Pt reports ability to complete ADLs with the assistance of: bedside commode, a rolling walker, a shower chair, and a raised toilet. Pt verbalized plan to discharge home via family transport. Pt has no other needs to be addressed at this time.    Discharge Barriers Identified: None    Payor: AETNA MANAGED MEDICARE / Plan: AETNA MEDICARE PLAN PPO / Product Type: Medicare Advantage /     Extended Emergency Contact Information  Primary Emergency Contact: Otoniel Ray  Address: [address]  Home Phone: [phone]  Mobile Phone: [phone]  Relation: Spouse  Secondary Emergency Contact: SAHRA MCGUIRE  Mobile Phone: [phone]  Relation: Son    Discharge Plan A: Home with family  Discharge Plan B: Home with family      [id number] [place] - [address]  [address]  [place] [address]  Phone: [phone] Fax: [phone]      Initial Assessment (most recent)       Adult Discharge Assessment - 12/02/22 1216          Discharge Assessment    Assessment Type Discharge Planning Assessment     Confirmed/corrected address, phone number and insurance Yes     Confirmed Demographics Correct on Facesheet     Source of Information patient;family     Does patient/caregiver understand observation status Yes     Communicated BAILEY with patient/caregiver Yes     Reason For Admission Encephalopathy, metabolic      Lives With spouse;child(shavon), adult     Facility Arrived From: Home     Do you expect to return to your current living situation? Yes     Do you have help at home or someone to help you manage your care at home? Yes     Who are your caregiver(s) and their phone number(s)? Otoniel Ray (Spouse)   [phone] (Mobile)     Prior to hospitilization cognitive status: Alert/Oriented     Current cognitive status: Alert/Oriented     Walking or Climbing Stairs Difficulty ambulation difficulty, requires equipment     Mobility Management Rolling walker     Dressing/Bathing Difficulty bathing difficulty, requires equipment     Dressing/Bathing Management Bedside commode, shower chair     Home Accessibility stairs to enter home     Number of Stairs, Main Entrance three     Surface of Stairs, Main Entrance hardwood     Stair Railings, Main Entrance railings safe and in good condition;railings on both sides of stairs     Home Layout Able to live on 1st floor     Equipment Currently Used at Home shower chair;walker, rolling;bedside commode;raised toilet     Readmission within 30 days? No     Patient currently being followed by outpatient case management? No     Do you currently have service(s) that help you manage your care at home? No     Do you take prescription medications? Yes     Do you have prescription coverage? Yes     Coverage Payor:  AETNA MANAGED MEDICARE - AETNA MEDICARE PLAN PPO     Do you have any problems affording any of your prescribed medications? No     Is the patient taking medications as prescribed? yes     Who is going to help you get home at discharge? Otoniel Ray (Spouse)   [phone] (Mobile)     How do you get to doctors appointments? family or friend will provide     Are you on dialysis? No     Do you take coumadin? No     Discharge Plan A Home with family     Discharge Plan B Home with family     DME Needed Upon Discharge  none     Discharge Plan discussed with: Patient;Spouse/sig other      Name(s) and Number(s) Otoniel Ray (Spouse)   [phone] (Mobile)     Discharge Barriers Identified None        Physical Activity    On average, how many days per week do you engage in moderate to strenuous exercise (like a brisk walk)? 3 days     On average, how many minutes do you engage in exercise at this level? 30 min        Financial Resource Strain    How hard is it for you to pay for the very basics like food, housing, medical care, and heating? Not hard at all        Housing Stability    In the last 12 months, was there a time when you were not able to pay the mortgage or rent on time? No     In the last 12 months, how many places have you lived? 1     In the last 12 months, was there a time when you did not have a steady place to sleep or slept in a shelter (including now)? No        Transportation Needs    In the past 12 months, has lack of transportation kept you from medical appointments or from getting medications? No     In the past 12 months, has lack of transportation kept you from meetings, work, or from getting things needed for daily living? No        Food Insecurity    Within the past 12 months, you worried that your food would run out before you got the money to buy more. Never true     Within the past 12 months, the food you bought just didn't last and you didn't have money to get more. Never true        Stress    Do you feel stress - tense, restless, nervous, or anxious, or unable to sleep at night because your mind is troubled all the time - these days? Not at all        Social Connections    In a typical week, how many times do you talk on the phone with family, friends, or neighbors? More than three times a week     How often do you get together with friends or relatives? Never     How often do you attend Bahai or Orthodoxy services? Never     Do you belong to any clubs or organizations such as Bahai groups, unions, fraternal or athletic groups, or school groups? No     How  often do you attend meetings of the clubs or organizations you belong to? Never     Are you , , , , never , or living with a partner?         Alcohol Use    Q1: How often do you have a drink containing alcohol? Never     Q2: How many drinks containing alcohol do you have on a typical day when you are drinking? Patient does not drink     Q3: How often do you have six or more drinks on one occasion? Never        Relationship/Environment    Name(s) of Who Lives With Patient Otoniel Ray (Spouse)   [phone] (Mobile); Aristeo Walls (Son) [phone]

## 2022-12-03 NOTE — DISCHARGE SUMMARY
[facility] Medicine  Discharge Summary      Patient Name: Froilan Ray  MRN: [id number]  IVAN: [id number]  Patient Class: OP- Observation  Admission Date: 11/30/2022  Hospital Length of Stay: 0 days  Discharge Date and Time: 12/2/2022  4:59 PM  Attending Physician: No att. providers found   Discharging Provider: Armando Anderson MD  Primary Care Provider: Alphonse Boothe III, MD    Primary Care Team: Networked reference to record PCT     HPI:   No notes on file    * No surgery found *      Hospital Course:   77 year old female with PMHx parox afib on Eliquis, Chronic HF, HTN brought in to the ED for not being her self for the past few days.  There were reports of subjective f/c, myalgias, URI Sx, Intermittent loose stool. She was afebrile in the ED.  There was also concern she may have taken her 's medication though this was not specifically witnessed. She was admitted for an encephalopathy workup.  Given the report of subjective F/C, she was empirically started on IV abx.  UA, CXR unrevealing of an infectious source. CT abdomen with small ventral hernia but no obstruction and no infectious focus. She does have intra and extra hepatic ductal dilatation but this appears unchanged when compared to 11/2020 study.  Viral panel did not yield a viral etiology.  Stool studies ordered but have not yet produced a sample.  Abdominal exam is benign.  One of four bottles of blood is growing Gram Positive cocci.  Repeat cultures ordered.  Her  is at bedside and finds she is still not back to herself. Initial head CT was negative for acute process.  I ordered an MRI of the brain to evaluate CVA and this was negative for an acute process.  Sedating home medication held including Flexeril and Lyrica. 12/2 she seems more alert, coherent and was oriented to all of my questions.  RN supplements she tried to get out of bed overnight and still was intermittently confused.  Her  felt she  was better today.  We discussed that present workup has been unrevealing of etiology but I can at least rule out life threatening etiologies.  I am discharging on course of Augmentin empirically as she did get better on an abx though this may be coincidental.  I discussed with her and her  to please follow up with her PCP to continue this workup.  They voiced agreement.  They wanted to go home and felt she would do well there.  I have no further inpatient testing.  Examined on day of discharge and alert, NAD, oriented to herself, [facility], year,  president, situation. No focal neuro deficits. She is blind in one eye.  Return precautions given.        Goals of Care Treatment Preferences:  Code Status: Full Code      Consults:     No new Assessment & Plan notes have been filed under this hospital service since the last note was generated.  Service: Hospital Medicine    Final Active Diagnoses:    Diagnosis Date Noted POA    HFrEF (heart failure with reduced ejection fraction) [I50.20] 06/30/2022 Yes    Anticoagulated [Z79.01] 06/30/2022 Not Applicable    Atrial fibrillation [I48.91] 06/16/2022 Yes    Drug-induced constipation [K59.03] 01/10/2020 Yes    Essential hypertension [I10] 12/11/2018 Yes      Problems Resolved During this Admission:    Diagnosis Date Noted Date Resolved POA    PRINCIPAL PROBLEM:  Encephalopathy, metabolic [G93.41] 06/15/2022 12/02/2022 Yes       Discharged Condition: good    Disposition: Home or Self Care    Follow Up:   Follow-up Information     Alphonse Boothe III, MD. Go on 12/13/2022.    Specialty: Family Medicine  Why: hospital follow up appointment scheduled for Dec. 13th at 12:40 PM. You will see the NP for this visit.  Contact information:  [facility]  [address] [place] [id number]  [phone]                       Patient Instructions:      Diet Cardiac     Notify your health care provider if you experience any of the following:  persistent nausea and vomiting or diarrhea      Notify your health care provider if you experience any of the following:  temperature >100.4     Notify your health care provider if you experience any of the following:  increased confusion or weakness     Activity as tolerated       Significant Diagnostic Studies: Labs:   CMP   Recent Labs   Lab 12/01/22  0447 12/02/22  0904   * 134*   K 3.2* 3.2*    103   CO2 22* 20*    125*   BUN 7* 8   CREATININE 0.4* 0.5   CALCIUM 8.6* 9.1   PROT 6.5 7.4   ALBUMIN 3.3* 3.7   BILITOT 0.8 0.5   ALKPHOS 72 78   AST 13 22   ALT 10 14   ANIONGAP 11 11    and CBC   Recent Labs   Lab 12/01/22  0447 12/02/22  0547   WBC 4.17 3.54*   HGB 11.0* 11.9*   HCT 34.2* 37.9   * 447       Pending Diagnostic Studies:     None         Medications:  Reconciled Home Medications:      Medication List      START taking these medications    amoxicillin-clavulanate 875-125mg 875-125 mg per tablet  Commonly known as: AUGMENTIN  Take 1 tablet by mouth 2 (two) times daily.        CONTINUE taking these medications    acetaminophen 325 MG tablet  Commonly known as: TYLENOL  Take 650 mg by mouth once.     alendronate 70 MG tablet  Commonly known as: FOSAMAX  Take 1 tablet (70 mg total) by mouth every 7 days.     amiodarone 200 MG Tab  Commonly known as: PACERONE  Take 1 tablet (200 mg total) by mouth once daily.     amlodipine-benazepril 5-20 mg 5-20 mg per capsule  Commonly known as: LOTREL  TAKE 1 CAPSULE BY MOUTH EVERY DAY     apixaban 2.5 mg Tab  Commonly known as: ELIQUIS  Take 1 tablet (2.5 mg total) by mouth 2 (two) times daily.     ascorbic acid (vitamin C) 250 MG tablet  Commonly known as: VITAMIN C  Take 1 tablet (250 mg total) by mouth once daily.     cyanocobalamin 100 MCG tablet  Commonly known as: VITAMIN B-12  Take 1 tablet (100 mcg total) by mouth once daily.     cyclobenzaprine 10 MG tablet  Commonly known as: FLEXERIL  TAKE 1 TABLET(10 MG) BY MOUTH THREE TIMES DAILY AS NEEDED FOR MUSCLE SPASMS     docusate  sodium 100 MG capsule  Commonly known as: COLACE  Take 1 capsule (100 mg total) by mouth 2 (two) times daily.     EScitalopram oxalate 20 MG tablet  Commonly known as: LEXAPRO  Take 1 tablet (20 mg total) by mouth once daily.     ferrous sulfate 325 (65 FE) MG EC tablet  Take 1 tablet (325 mg total) by mouth once daily.     furosemide 40 MG tablet  Commonly known as: LASIX  Take 1 tablet (40 mg total) by mouth daily as needed (swelling).     metoprolol tartrate 50 MG tablet  Commonly known as: LOPRESSOR  Take 1 tablet (50 mg total) by mouth 3 (three) times daily.     omeprazole 40 MG capsule  Commonly known as: PRILOSEC  Take 1 capsule (40 mg total) by mouth every morning.     oxyCODONE-acetaminophen  mg per tablet  Commonly known as: PERCOCET  Take one tablet by mouth every 4 to 6 hours as needed for pain     polyethylene glycol 17 gram Pwpk  Commonly known as: GLYCOLAX  Take 17 g by mouth 2 (two) times daily.     potassium chloride 10 MEQ Tbsr  Commonly known as: KLOR-CON  Take 1 tablet (10 mEq total) by mouth once daily.     pregabalin 200 MG Cap  Commonly known as: LYRICA  TAKE 1 CAPSULE(200 MG) BY MOUTH THREE TIMES DAILY     triamcinolone acetonide 0.1% 0.1 % ointment  Commonly known as: KENALOG  Apply topically 2 (two) times daily.            Indwelling Lines/Drains at time of discharge:   Lines/Drains/Airways     None                 Time spent on the discharge of patient: 33 minutes         Armando Anderson MD  Department of Hospital Medicine  [facility]

## 2022-12-03 NOTE — HOSPITAL COURSE
77 year old female with PMHx parox afib on Eliquis, Chronic HF, HTN brought in to the ED for not being her self for the past few days.  There were reports of subjective f/c, myalgias, URI Sx, Intermittent loose stool. She was afebrile in the ED.  There was also concern she may have taken her 's medication though this was not specifically witnessed. She was admitted for an encephalopathy workup.  Given the report of subjective F/C, she was empirically started on IV abx.  UA, CXR unrevealing of an infectious source. CT abdomen with small ventral hernia but no obstruction and no infectious focus. She does have intra and extra hepatic ductal dilatation but this appears unchanged when compared to 11/2020 study.  Viral panel did not yield a viral etiology.  Stool studies ordered but have not yet produced a sample.  Abdominal exam is benign.  One of four bottles of blood is growing Gram Positive cocci.  Repeat cultures ordered.  Her  is at bedside and finds she is still not back to herself. Initial head CT was negative for acute process.  I ordered an MRI of the brain to evaluate CVA and this was negative for an acute process.  Sedating home medication held including Flexeril and Lyrica. 12/2 she seems more alert, coherent and was oriented to all of my questions.  RN supplements she tried to get out of bed overnight and still was intermittently confused.  Her  felt she was better today.  We discussed that present workup has been unrevealing of etiology but I can at least rule out life threatening etiologies.  I am discharging on course of Augmentin empirically as she did get better on an abx though this may be coincidental.  I discussed with her and her  to please follow up with her PCP to continue this workup.  They voiced agreement.  They wanted to go home and felt she would do well there.  I have no further inpatient testing.  Examined on day of discharge and alert, NAD, oriented to herself,  [facility], year,  president, situation. No focal neuro deficits. She is blind in one eye.  Return precautions given.

## 2022-12-05 LAB — BACTERIA BLD CULT: NORMAL

## 2022-12-06 LAB — BACTERIA BLD CULT: NORMAL

## 2023-01-26 ENCOUNTER — HOSPITAL ENCOUNTER (EMERGENCY)
Facility: HOSPITAL | Age: 78
Discharge: LEFT BEFORE DISCHARGE | End: 2023-01-26
Attending: EMERGENCY MEDICINE
Payer: MEDICARE

## 2023-01-26 VITALS
BODY MASS INDEX: 22.34 KG/M2 | WEIGHT: 139 LBS | TEMPERATURE: 98 F | RESPIRATION RATE: 20 BRPM | DIASTOLIC BLOOD PRESSURE: 87 MMHG | HEIGHT: 66 IN | OXYGEN SATURATION: 96 % | SYSTOLIC BLOOD PRESSURE: 148 MMHG | HEART RATE: 91 BPM

## 2023-01-26 DIAGNOSIS — R50.9 FEVER AND CHILLS: ICD-10-CM

## 2023-01-26 DIAGNOSIS — R51.9 HEADACHE: ICD-10-CM

## 2023-01-26 DIAGNOSIS — I10 ELEVATED BLOOD PRESSURE READING WITH DIAGNOSIS OF HYPERTENSION: ICD-10-CM

## 2023-01-26 DIAGNOSIS — R51.9 ACUTE NONINTRACTABLE HEADACHE, UNSPECIFIED HEADACHE TYPE: Primary | ICD-10-CM

## 2023-01-26 LAB
ALBUMIN SERPL BCP-MCNC: 3.8 G/DL (ref 3.5–5.2)
ALP SERPL-CCNC: 77 U/L (ref 55–135)
ALT SERPL W/O P-5'-P-CCNC: 12 U/L (ref 10–44)
ANION GAP SERPL CALC-SCNC: 13 MMOL/L (ref 8–16)
AST SERPL-CCNC: 17 U/L (ref 10–40)
BACTERIA #/AREA URNS HPF: NEGATIVE /HPF
BASOPHILS # BLD AUTO: 0.03 K/UL (ref 0–0.2)
BASOPHILS NFR BLD: 0.5 % (ref 0–1.9)
BILIRUB SERPL-MCNC: 0.9 MG/DL (ref 0.1–1)
BILIRUB UR QL STRIP: NEGATIVE
BUN SERPL-MCNC: 12 MG/DL (ref 8–23)
CALCIUM SERPL-MCNC: 9.2 MG/DL (ref 8.7–10.5)
CHLORIDE SERPL-SCNC: 103 MMOL/L (ref 95–110)
CLARITY UR: CLEAR
CO2 SERPL-SCNC: 23 MMOL/L (ref 23–29)
COLOR UR: YELLOW
CREAT SERPL-MCNC: 0.5 MG/DL (ref 0.5–1.4)
DIFFERENTIAL METHOD: ABNORMAL
EOSINOPHIL # BLD AUTO: 0 K/UL (ref 0–0.5)
EOSINOPHIL NFR BLD: 0.2 % (ref 0–8)
ERYTHROCYTE [DISTWIDTH] IN BLOOD BY AUTOMATED COUNT: 11.9 % (ref 11.5–14.5)
EST. GFR  (NO RACE VARIABLE): >60 ML/MIN/1.73 M^2
GLUCOSE SERPL-MCNC: 111 MG/DL (ref 70–110)
GLUCOSE UR QL STRIP: NEGATIVE
HCT VFR BLD AUTO: 35 % (ref 37–48.5)
HGB BLD-MCNC: 11.1 G/DL (ref 12–16)
HGB UR QL STRIP: NEGATIVE
HYALINE CASTS #/AREA URNS LPF: 1 /LPF
IMM GRANULOCYTES # BLD AUTO: 0.02 K/UL (ref 0–0.04)
IMM GRANULOCYTES NFR BLD AUTO: 0.3 % (ref 0–0.5)
INFLUENZA A, MOLECULAR: NEGATIVE
INFLUENZA B, MOLECULAR: NEGATIVE
KETONES UR QL STRIP: ABNORMAL
LEUKOCYTE ESTERASE UR QL STRIP: NEGATIVE
LIPASE SERPL-CCNC: 21 U/L (ref 4–60)
LYMPHOCYTES # BLD AUTO: 0.7 K/UL (ref 1–4.8)
LYMPHOCYTES NFR BLD: 11.3 % (ref 18–48)
MCH RBC QN AUTO: 28.2 PG (ref 27–31)
MCHC RBC AUTO-ENTMCNC: 31.7 G/DL (ref 32–36)
MCV RBC AUTO: 89 FL (ref 82–98)
MICROSCOPIC COMMENT: NORMAL
MONOCYTES # BLD AUTO: 0.2 K/UL (ref 0.3–1)
MONOCYTES NFR BLD: 2.6 % (ref 4–15)
NEUTROPHILS # BLD AUTO: 5.2 K/UL (ref 1.8–7.7)
NEUTROPHILS NFR BLD: 85.1 % (ref 38–73)
NITRITE UR QL STRIP: NEGATIVE
NRBC BLD-RTO: 0 /100 WBC
PH UR STRIP: 6 [PH] (ref 5–8)
PLATELET # BLD AUTO: 435 K/UL (ref 150–450)
PMV BLD AUTO: 9.6 FL (ref 9.2–12.9)
POTASSIUM SERPL-SCNC: 3.9 MMOL/L (ref 3.5–5.1)
PROT SERPL-MCNC: 6.7 G/DL (ref 6–8.4)
PROT UR QL STRIP: ABNORMAL
RBC # BLD AUTO: 3.93 M/UL (ref 4–5.4)
RBC #/AREA URNS HPF: 1 /HPF (ref 0–4)
SARS-COV-2 RDRP RESP QL NAA+PROBE: NEGATIVE
SODIUM SERPL-SCNC: 139 MMOL/L (ref 136–145)
SP GR UR STRIP: 1.02 (ref 1–1.03)
SPECIMEN SOURCE: NORMAL
SQUAMOUS #/AREA URNS HPF: 0 /HPF
URN SPEC COLLECT METH UR: ABNORMAL
UROBILINOGEN UR STRIP-ACNC: NEGATIVE EU/DL
WBC # BLD AUTO: 6.1 K/UL (ref 3.9–12.7)
WBC #/AREA URNS HPF: 0 /HPF (ref 0–5)

## 2023-01-26 PROCEDURE — 87502 INFLUENZA DNA AMP PROBE: CPT | Performed by: STUDENT IN AN ORGANIZED HEALTH CARE EDUCATION/TRAINING PROGRAM

## 2023-01-26 PROCEDURE — 99284 EMERGENCY DEPT VISIT MOD MDM: CPT | Mod: 25

## 2023-01-26 PROCEDURE — 83690 ASSAY OF LIPASE: CPT | Performed by: STUDENT IN AN ORGANIZED HEALTH CARE EDUCATION/TRAINING PROGRAM

## 2023-01-26 PROCEDURE — 93010 ELECTROCARDIOGRAM REPORT: CPT | Mod: ,,, | Performed by: INTERNAL MEDICINE

## 2023-01-26 PROCEDURE — U0002 COVID-19 LAB TEST NON-CDC: HCPCS | Performed by: STUDENT IN AN ORGANIZED HEALTH CARE EDUCATION/TRAINING PROGRAM

## 2023-01-26 PROCEDURE — 93005 ELECTROCARDIOGRAM TRACING: CPT | Performed by: INTERNAL MEDICINE

## 2023-01-26 PROCEDURE — 81001 URINALYSIS AUTO W/SCOPE: CPT | Performed by: STUDENT IN AN ORGANIZED HEALTH CARE EDUCATION/TRAINING PROGRAM

## 2023-01-26 PROCEDURE — 85025 COMPLETE CBC W/AUTO DIFF WBC: CPT | Performed by: STUDENT IN AN ORGANIZED HEALTH CARE EDUCATION/TRAINING PROGRAM

## 2023-01-26 PROCEDURE — 93010 EKG 12-LEAD: ICD-10-PCS | Mod: ,,, | Performed by: INTERNAL MEDICINE

## 2023-01-26 PROCEDURE — 25000003 PHARM REV CODE 250: Performed by: STUDENT IN AN ORGANIZED HEALTH CARE EDUCATION/TRAINING PROGRAM

## 2023-01-26 PROCEDURE — 80053 COMPREHEN METABOLIC PANEL: CPT | Performed by: STUDENT IN AN ORGANIZED HEALTH CARE EDUCATION/TRAINING PROGRAM

## 2023-01-26 RX ORDER — METOPROLOL TARTRATE 50 MG/1
50 TABLET ORAL
Status: COMPLETED | OUTPATIENT
Start: 2023-01-26 | End: 2023-01-26

## 2023-01-26 RX ORDER — ACETAMINOPHEN 500 MG
1000 TABLET ORAL
Status: COMPLETED | OUTPATIENT
Start: 2023-01-26 | End: 2023-01-26

## 2023-01-26 RX ORDER — AMLODIPINE BESYLATE 5 MG/1
5 TABLET ORAL
Status: COMPLETED | OUTPATIENT
Start: 2023-01-26 | End: 2023-01-26

## 2023-01-26 RX ADMIN — AMLODIPINE BESYLATE 5 MG: 5 TABLET ORAL at 10:01

## 2023-01-26 RX ADMIN — ACETAMINOPHEN 1000 MG: 500 TABLET, FILM COATED ORAL at 10:01

## 2023-01-26 RX ADMIN — METOPROLOL TARTRATE 50 MG: 50 TABLET, FILM COATED ORAL at 10:01

## 2023-01-26 NOTE — DISCHARGE INSTRUCTIONS
Please follow-up with your primary care provider as soon as possible.  You may continue to treat your symptoms as needed with over-the-counter medications.  Please return to the emergency department if you have worsening of your current symptoms or new concerns.

## 2023-01-26 NOTE — ED PROVIDER NOTES
Encounter Date: 1/26/2023       History     Chief Complaint   Patient presents with    Headache     Vomited     HPI  77 female with hx HTN, a-fib, IBS, chronic pain presenting for 12 hours chills, subjective fever, sweating, headache, 1 episode N/V. Denies chest pain, SOB, urinary changes (increased/decreased, color change, dysuria), extremity swelling, neck stiffness, motor or sensory changes. Has not taken anything for these symptoms.  Review of patient's allergies indicates:  No Known Allergies  Past Medical History:   Diagnosis Date    Anemia     Arthritis     Bleeding ulcer 07/2016    Chronic pain     DDD (degenerative disc disease), cervical     DDD (degenerative disc disease), lumbar     Depression     Disc degeneration, lumbosacral     Diverticulitis     Encounter for blood transfusion     Hypertension     IBS (irritable bowel syndrome)     Neuropathy of both feet     Seizures     none  since 2017    Umbilical hernia 2020     Past Surgical History:   Procedure Laterality Date    BACK SURGERY      BREAST BIOPSY      BREAST SURGERY Right     lumpectomy    CAUDAL EPIDURAL STEROID INJECTION N/A 01/28/2022    Procedure: Injection-steroid-epidural-caudal;  Surgeon: Luzmaria Marcelino MD;  Location: [facility] OR;  Service: Pain Management;  Laterality: N/A;    COLONOSCOPY N/A 01/14/2022    Procedure: COLONOSCOPY;  Surgeon: Abby Landers MD;  Location: [facility];  Service: Endoscopy;  Laterality: N/A;    ENDOSCOPIC ULTRASOUND OF UPPER GASTROINTESTINAL TRACT N/A 07/21/2020    Procedure: ULTRASOUND, UPPER GI TRACT, ENDOSCOPIC;  Surgeon: Marcio Nguyễn III, MD;  Location: [facility];  Service: Endoscopy;  Laterality: N/A;    ESOPHAGOGASTRODUODENOSCOPY N/A 01/14/2022    Procedure: EGD (ESOPHAGOGASTRODUODENOSCOPY);  Surgeon: Abby Landers MD;  Location: [facility];  Service: Endoscopy;  Laterality: N/A;    ESOPHAGOGASTRODUODENOSCOPY N/A 06/10/2022    Procedure: EGD (ESOPHAGOGASTRODUODENOSCOPY);  Surgeon: Abby MAGALLON  MD [place];  Location: [facility] ENDO;  Service: Endoscopy;  Laterality: N/A;    EYE SURGERY      cataract    HYSTERECTOMY      INTRAMEDULLARY RODDING OF TROCHANTER OF FEMUR Left 12/11/2018    Procedure: INSERTION, INTRAMEDULLARY SANTOS, FEMUR, TROCHANTER;  Surgeon: Eulalio De La Cruz MD;  Location: [facility] OR;  Service: Orthopedics;  Laterality: Left;    SPINAL CORD STIMULATOR IMPLANT  09/18/2013    and removal    SPINE SURGERY  2006    lumbar L2-S1 decompression.    SPINE SURGERY      cervical decompression    TONSILLECTOMY       Family History   Problem Relation Age of Onset    Diabetes Mother     Hypertension Mother     Irritable bowel syndrome Mother     Diabetes Father         insulin dependent    Hypertension Father     Heart disease Father     Coronary artery disease Father     Depression Father     Diabetes Sister     Diabetes Brother     COPD Brother      Social History     Tobacco Use    Smoking status: Never    Smokeless tobacco: Never   Substance Use Topics    Alcohol use: No    Drug use: No     Review of Systems    Physical Exam     Initial Vitals [01/26/23 0955]   BP Pulse Resp Temp SpO2   (!) 148/87 91 20 97.7 °F (36.5 °C) 96 %      MAP       --         Physical Exam    Nursing note and vitals reviewed.  Constitutional: She appears well-developed and well-nourished. She is not diaphoretic. She is cooperative.  Non-toxic appearance. She does not have a sickly appearance. She appears ill. No distress. She is not intubated.   HENT:   Head: Normocephalic and atraumatic.   Nose: Nose normal.   Eyes: Conjunctivae and EOM are normal.   Neck:   Normal range of motion.  Cardiovascular:  Normal rate and regular rhythm.           Pulmonary/Chest: Effort normal and breath sounds normal. No accessory muscle usage. No tachypnea. She is not intubated. No respiratory distress. She has no wheezes. She has no rhonchi.   Abdominal: Abdomen is soft and flat. She exhibits no distension. There is abdominal tenderness in the  periumbilical area and left lower quadrant. There is no rebound and no guarding.   Musculoskeletal:         General: No tenderness or edema. Normal range of motion.      Cervical back: Normal range of motion.     Neurological: She is alert and oriented to person, place, and time. No cranial nerve deficit.   Skin: Skin is warm and dry.   Psychiatric: She has a normal mood and affect. Her behavior is normal.     PGY-3 MDM:  Froilan Ray is a 77 y.o. year old female with hx HTN, a-fib, IBS, chronic pain presenting for 12 hours chills, subjective fever, sweating, headache, 1 episode N/V.    On arrival patient vitals were   Vitals:    01/26/23 0955   BP: (!) 148/87   Pulse: 91   Resp: 20   Temp: 97.7 °F (36.5 °C)        On examination, patient Overall well-appearing,, Normal volume heart sounds, normal lung sounds, Extremities without swelling or calf tenderness Abdomen soft with moderate TTP in periumbilical and LLQ regions.    Previous notes were reviewed from discharge summery 12/2/2022 with the patient presented for encephalopathy.  At that time she was found to have a periumbilical hernia but no evidence of intra-abdominal infection.  At that time however she had 4/4 culture bottles positive.  Concerned that patient may again be bacteremic would like to evaluate for possible source with CT abdomen and pelvis given her new abdominal pain in associated nausea and vomiting.    History also obtained from the patient's  who is at bedside.    My independent analysis of EKG is Normal rate and rhythm, No ST elevations/depressions, no T-wave inversions, Normal ND interval, normal QRS duration, normal QTC, Unchanged from previous EKG from 11/30/22    My independent analysis of the patient's chest xray is Adequate exposure. , Trachea, duncan, and mainstem bronchi visible without significant deviation, No apparent bony abnormality, Cardiac silhouette within normal limits, No evidence of pneumothorax, No evidence of  pleural effusion, costophrenic angles sharp, No evidence of focal pneumonia    Labs ordered include CBC, CMP, lipase, Covid/flu swab, UA.    Patient was given the following in the emergency department and reported improvement in  symptoms.   Medications   amLODIPine tablet 5 mg (5 mg Oral Given 1/26/23 1031)   metoprolol tartrate (LOPRESSOR) tablet 50 mg (50 mg Oral Given 1/26/23 1031)   acetaminophen tablet 1,000 mg (1,000 mg Oral Given 1/26/23 1031)      Patient felt better after medications listed above, requested to leave prior to all labs returning and before CT abdomen pelvis performed. Dr. Diaz discussed the risks include worsening of condition, death, and permanent disability and patient still wished to leave (see full AMA documentation below).    Discussed available results with patient and health plan including return precautions for worsening clinical condition. Advised to follow up with primary care provider or return to the emergency department if needed, especially given that patient opted to leave AMA.     AMA DOCUMENTATION  It was discussed with the patient what changes in care would have to occur for my patient to agree with the current, recommended treatment plan.  The reason the patient offers for leaving/choosing against medical advice is: feeling better, ready to go.    My patient displays normal decision making capacity; alert and oriented to person place and situation, not altered or under the influence. MD Rousseau explained to the patient the nature of their  illness, injury, or disease and the need and advisability of continued in-hospital evaluation and treatment together with the known risks of discontinuing medical care at this time. MD Rousseau explained the risks of worsening condition, permanent disability, death, worsening of condition in layman's terms to the patient. The patient is aware of the risks and benefits of the current treatment plan and the alternative therapies offered.       Furthermore, MD Rousseau offered the patient alternatives to the preferred evaluation and treatment plan including staying in hospital returning to the ER, and watchful waiting. The risks and benefits of these alternatives were discussed. Those alternative therapies that my patient agrees to are: following up with primary care as soon as possible or returning to emergency department.    The patient is encouraged to return to the Emergency Department at any time to receive care and resume treatment. Present for this conversation were: MD Julio Cesar Rousseau.    I will discharge the patient against medical advice.    Warren Kim MD  [facility] Emergency Medicine, PGY-2  12:20 PM  1/26/2023    ED Course   Procedures  Labs Reviewed   CBC W/ AUTO DIFFERENTIAL - Abnormal; Notable for the following components:       Result Value    RBC 3.93 (*)     Hemoglobin 11.1 (*)     Hematocrit 35.0 (*)     MCHC 31.7 (*)     Lymph # 0.7 (*)     Mono # 0.2 (*)     Gran % 85.1 (*)     Lymph % 11.3 (*)     Mono % 2.6 (*)     All other components within normal limits   COMPREHENSIVE METABOLIC PANEL - Abnormal; Notable for the following components:    Glucose 111 (*)     All other components within normal limits   URINALYSIS, REFLEX TO URINE CULTURE - Abnormal; Notable for the following components:    Protein, UA 1+ (*)     Ketones, UA 3+ (*)     All other components within normal limits    Narrative:     Specimen Source->Urine   LIPASE   INFLUENZA A AND B ANTIGEN    Narrative:     Specimen Source->Nasopharyngeal Swab   SARS-COV-2 RNA AMPLIFICATION, QUAL   URINALYSIS MICROSCOPIC    Narrative:     Specimen Source->Urine        ECG Results              EKG 12-lead (In process)  Result time 01/26/23 13:34:46      In process by Interface, Lab In [facility] (01/26/23 13:34:46)                   Narrative:    Test Reason : R51.9,    Vent. Rate : 090 BPM     Atrial Rate : 091 BPM     P-R Int : 158 ms          QRS Dur : 082 ms      QT Int : 404 ms       P-R-T  Axes : 066 004 059 degrees     QTc Int : 494 ms    Normal sinus rhythm  Possible Left atrial enlargement  Septal infarct ,age undetermined  Abnormal ECG  When compared with ECG of 30-NOV-2022 19:15,  No significant change was found    Referred By: [email or web address]   SELF           Confirmed By:                       In process by Interface, Lab In [facility] (01/26/23 10:39:07)                   Narrative:    Test Reason : R51.9,    Vent. Rate : 090 BPM     Atrial Rate : 091 BPM     P-R Int : 158 ms          QRS Dur : 082 ms      QT Int : 404 ms       P-R-T Axes : 066 004 059 degrees     QTc Int : 494 ms    Normal sinus rhythm  Possible Left atrial enlargement  Septal infarct ,age undetermined  Abnormal ECG  When compared with ECG of 30-NOV-2022 19:15,  No significant change was found    Referred By: [email or web address]   SELF           Confirmed By:                                   Imaging Results              X-Ray Chest AP Portable (Final result)  Result time 01/26/23 10:56:35      Final result by Bryan Stevens MD (01/26/23 10:56:35)                   Narrative:    Reason: headache    FINDINGS:  Portable chest at 1038 compared with 11/30/2022 shows normal cardiomediastinal silhouette.    Lungs are clear aside from calcified granuloma in left lung base. Pulmonary vasculature is normal. No acute osseous abnormality.    IMPRESSION:  No acute cardiopulmonary abnormality.    Electronically signed by:  Bryan Stevens MD  1/26/2023 10:56 AM [facility] Workstation: [id number]                                     Medications   amLODIPine tablet 5 mg (5 mg Oral Given 1/26/23 1031)   metoprolol tartrate (LOPRESSOR) tablet 50 mg (50 mg Oral Given 1/26/23 1031)   acetaminophen tablet 1,000 mg (1,000 mg Oral Given 1/26/23 1031)                Attending Attestation:             Attending ED Notes:   Patient reports that she is feeling better and would like to leave.  Patient understands that labs have not fully returned and that imaging has not been  completed patient reports that she does not wish these tests and that she is feeling better and wants to go home.  Patient is alert and oriented x4 and appears to have capacity to make her own medical decisions patient understands that by leaving there is a risk of death, disability, deterioration, patient understands that she can change her mind at any time and return and that she is encouraged to do so.    MDM    Patient presents for emergent evaluation of acute complaint that poses a possible threat to life and/or bodily function.    I may have ordered labs and personally reviewed them. If applicable, Labs significant for abnormalities noted above.    I may have ordered X-rays and personally reviewed them and reviewed the radiologist interpretation.  If applicable, Xray significant for findings noted above.    I may have ordered EKG and personally reviewed it.  If applicable, EKG significant for findings noted above.    I may have ordered CT scan and personally reviewed it and reviewed the radiologist interpretation.  If applicable, CT significant for findings noted above.      I had a detailed discussion with the patient and/or guardian regarding: The historical points, exam findings, and diagnostic results supporting the discharge diagnosis, lab results, pertinent radiology results, and the need for outpatient follow-up, for definitive care with a family practitioner and to return to the emergency department if symptoms worsen or persist or if there are any questions or concerns that arise at home. All questions have been answered in detail. Strict return to Emergency Department precautions have been provided.     A dictation software program was used for this note.  Please expect some simple typographical  errors in this note.                        Clinical Impression:   Final diagnoses:  [R51.9] Headache  [R51.9] Acute nonintractable headache, unspecified headache type (Primary)  [R50.9] Fever and  chills  [I10] Elevated blood pressure reading with diagnosis of hypertension        ED Disposition Condition    AMA Stable                Warren Kim MD  Resident  01/26/23 1732       Julio Cesar Rousseau MD  01/26/23 5747

## 2023-04-04 NOTE — DISCHARGE INSTRUCTIONS
Recovery After Procedural Sedation (Adult)   You have been given medicine by vein to make you sleep during your surgery. This may have included both a pain medicine and sleeping medicine. Most of the effects have worn off. But you may still have some drowsiness for the next 6 to 8 hours.  Home care  Follow these guidelines when you get home:  · For the next 8 hours, you should be watched by a responsible adult. This person should make sure your condition is not getting worse.  · Don't drink any alcohol for the next 24 hours.  · Don't drive, operate dangerous machinery, or make important business or personal decisions during the next 24 hours.  · To prevent injury or falls, use caution when standing and walking for at least 24 hours after your procedure.  Note: Your healthcare provider may tell you not to take any medicine by mouth for pain or sleep in the next 4 hours. These medicines may react with the medicines you were given in the hospital. This could cause a much stronger response than usual.  Follow-up care  Follow up with your healthcare provider if you are not alert and back to your usual level of activity within 12 hours.  When to seek medical advice  Call your healthcare provider right away if any of these occur:  · Drowsiness gets worse  · Weakness or dizziness gets worse  · Repeated vomiting  · You can't be awakened  · Fever  · New rash  [employer] last reviewed this educational content on 9/1/2019  © 1791-9285 [employer] [facility]. [address] [place] [address], [place] [address]. All rights reserved. This information is not intended as a substitute for professional medical care. Always follow your healthcare professional's instructions.         done done

## 2023-05-16 ENCOUNTER — HOSPITAL ENCOUNTER (OUTPATIENT)
Facility: HOSPITAL | Age: 78
Discharge: HOME HEALTH | End: 2023-05-17
Attending: EMERGENCY MEDICINE | Admitting: STUDENT IN AN ORGANIZED HEALTH CARE EDUCATION/TRAINING PROGRAM
Payer: MEDICARE

## 2023-05-16 DIAGNOSIS — M54.16 LUMBAR RADICULOPATHY: ICD-10-CM

## 2023-05-16 DIAGNOSIS — M96.1 FAILED BACK SYNDROME OF LUMBAR SPINE: ICD-10-CM

## 2023-05-16 DIAGNOSIS — M25.552 LEFT HIP PAIN: Primary | ICD-10-CM

## 2023-05-16 DIAGNOSIS — S32.415A CLOSED NONDISPLACED FRACTURE OF ANTERIOR WALL OF LEFT ACETABULUM, INITIAL ENCOUNTER: ICD-10-CM

## 2023-05-16 DIAGNOSIS — M47.896 OTHER SPONDYLOSIS, LUMBAR REGION: ICD-10-CM

## 2023-05-16 DIAGNOSIS — M51.36 DDD (DEGENERATIVE DISC DISEASE), LUMBAR: ICD-10-CM

## 2023-05-16 PROBLEM — S32.409A CLOSED FRACTURE OF ACETABULUM: Status: ACTIVE | Noted: 2023-05-16

## 2023-05-16 LAB
ALBUMIN SERPL BCP-MCNC: 3.6 G/DL (ref 3.5–5.2)
ALP SERPL-CCNC: 73 U/L (ref 55–135)
ALT SERPL W/O P-5'-P-CCNC: 14 U/L (ref 10–44)
ANION GAP SERPL CALC-SCNC: 6 MMOL/L (ref 8–16)
AST SERPL-CCNC: 18 U/L (ref 10–40)
BASOPHILS # BLD AUTO: 0.04 K/UL (ref 0–0.2)
BASOPHILS NFR BLD: 0.6 % (ref 0–1.9)
BILIRUB SERPL-MCNC: 0.5 MG/DL (ref 0.1–1)
BILIRUB UR QL STRIP: NEGATIVE
BUN SERPL-MCNC: 14 MG/DL (ref 8–23)
CALCIUM SERPL-MCNC: 8.9 MG/DL (ref 8.7–10.5)
CHLORIDE SERPL-SCNC: 101 MMOL/L (ref 95–110)
CLARITY UR: CLEAR
CO2 SERPL-SCNC: 26 MMOL/L (ref 23–29)
COLOR UR: YELLOW
CREAT SERPL-MCNC: 0.8 MG/DL (ref 0.5–1.4)
DIFFERENTIAL METHOD: ABNORMAL
EOSINOPHIL # BLD AUTO: 0.1 K/UL (ref 0–0.5)
EOSINOPHIL NFR BLD: 1.3 % (ref 0–8)
ERYTHROCYTE [DISTWIDTH] IN BLOOD BY AUTOMATED COUNT: 14.5 % (ref 11.5–14.5)
EST. GFR  (NO RACE VARIABLE): >60 ML/MIN/1.73 M^2
GLUCOSE SERPL-MCNC: 114 MG/DL (ref 70–110)
GLUCOSE UR QL STRIP: NEGATIVE
HCT VFR BLD AUTO: 28.5 % (ref 37–48.5)
HGB BLD-MCNC: 8.5 G/DL (ref 12–16)
HGB UR QL STRIP: NEGATIVE
IMM GRANULOCYTES # BLD AUTO: 0.02 K/UL (ref 0–0.04)
IMM GRANULOCYTES NFR BLD AUTO: 0.3 % (ref 0–0.5)
KETONES UR QL STRIP: NEGATIVE
LEUKOCYTE ESTERASE UR QL STRIP: NEGATIVE
LYMPHOCYTES # BLD AUTO: 0.8 K/UL (ref 1–4.8)
LYMPHOCYTES NFR BLD: 12 % (ref 18–48)
MCH RBC QN AUTO: 24.6 PG (ref 27–31)
MCHC RBC AUTO-ENTMCNC: 29.8 G/DL (ref 32–36)
MCV RBC AUTO: 83 FL (ref 82–98)
MONOCYTES # BLD AUTO: 0.5 K/UL (ref 0.3–1)
MONOCYTES NFR BLD: 7.1 % (ref 4–15)
NEUTROPHILS # BLD AUTO: 5.3 K/UL (ref 1.8–7.7)
NEUTROPHILS NFR BLD: 78.7 % (ref 38–73)
NITRITE UR QL STRIP: NEGATIVE
NRBC BLD-RTO: 0 /100 WBC
PH UR STRIP: 6 [PH] (ref 5–8)
PLATELET # BLD AUTO: 345 K/UL (ref 150–450)
PMV BLD AUTO: 9 FL (ref 9.2–12.9)
POTASSIUM SERPL-SCNC: 4 MMOL/L (ref 3.5–5.1)
PROT SERPL-MCNC: 6.6 G/DL (ref 6–8.4)
PROT UR QL STRIP: NEGATIVE
RBC # BLD AUTO: 3.45 M/UL (ref 4–5.4)
SODIUM SERPL-SCNC: 133 MMOL/L (ref 136–145)
SP GR UR STRIP: 1.01 (ref 1–1.03)
URN SPEC COLLECT METH UR: NORMAL
UROBILINOGEN UR STRIP-ACNC: NEGATIVE EU/DL
WBC # BLD AUTO: 6.75 K/UL (ref 3.9–12.7)

## 2023-05-16 PROCEDURE — G0378 HOSPITAL OBSERVATION PER HR: HCPCS

## 2023-05-16 PROCEDURE — 25000003 PHARM REV CODE 250: Performed by: STUDENT IN AN ORGANIZED HEALTH CARE EDUCATION/TRAINING PROGRAM

## 2023-05-16 PROCEDURE — 96376 TX/PRO/DX INJ SAME DRUG ADON: CPT

## 2023-05-16 PROCEDURE — 99285 EMERGENCY DEPT VISIT HI MDM: CPT | Mod: 25

## 2023-05-16 PROCEDURE — 96372 THER/PROPH/DIAG INJ SC/IM: CPT | Mod: 59 | Performed by: EMERGENCY MEDICINE

## 2023-05-16 PROCEDURE — 80053 COMPREHEN METABOLIC PANEL: CPT | Performed by: EMERGENCY MEDICINE

## 2023-05-16 PROCEDURE — 63600175 PHARM REV CODE 636 W HCPCS: Performed by: EMERGENCY MEDICINE

## 2023-05-16 PROCEDURE — 96374 THER/PROPH/DIAG INJ IV PUSH: CPT

## 2023-05-16 PROCEDURE — 85025 COMPLETE CBC W/AUTO DIFF WBC: CPT | Performed by: EMERGENCY MEDICINE

## 2023-05-16 PROCEDURE — 81003 URINALYSIS AUTO W/O SCOPE: CPT | Performed by: STUDENT IN AN ORGANIZED HEALTH CARE EDUCATION/TRAINING PROGRAM

## 2023-05-16 RX ORDER — SODIUM CHLORIDE 0.9 % (FLUSH) 0.9 %
10 SYRINGE (ML) INJECTION
Status: DISCONTINUED | OUTPATIENT
Start: 2023-05-16 | End: 2023-05-17 | Stop reason: HOSPADM

## 2023-05-16 RX ORDER — ACETAMINOPHEN 325 MG/1
650 TABLET ORAL EVERY 8 HOURS PRN
Status: DISCONTINUED | OUTPATIENT
Start: 2023-05-16 | End: 2023-05-17 | Stop reason: HOSPADM

## 2023-05-16 RX ORDER — POLYETHYLENE GLYCOL 3350 17 G/17G
17 POWDER, FOR SOLUTION ORAL DAILY
Status: DISCONTINUED | OUTPATIENT
Start: 2023-05-17 | End: 2023-05-17 | Stop reason: HOSPADM

## 2023-05-16 RX ORDER — AMLODIPINE AND BENAZEPRIL HYDROCHLORIDE 5; 20 MG/1; MG/1
1 CAPSULE ORAL DAILY
Status: DISCONTINUED | OUTPATIENT
Start: 2023-05-17 | End: 2023-05-16

## 2023-05-16 RX ORDER — MORPHINE SULFATE 2 MG/ML
6 INJECTION, SOLUTION INTRAMUSCULAR; INTRAVENOUS
Status: COMPLETED | OUTPATIENT
Start: 2023-05-16 | End: 2023-05-16

## 2023-05-16 RX ORDER — MORPHINE SULFATE 4 MG/ML
4 INJECTION, SOLUTION INTRAMUSCULAR; INTRAVENOUS
Status: COMPLETED | OUTPATIENT
Start: 2023-05-16 | End: 2023-05-16

## 2023-05-16 RX ORDER — MORPHINE SULFATE 2 MG/ML
2 INJECTION, SOLUTION INTRAMUSCULAR; INTRAVENOUS EVERY 4 HOURS PRN
Status: DISCONTINUED | OUTPATIENT
Start: 2023-05-16 | End: 2023-05-17 | Stop reason: HOSPADM

## 2023-05-16 RX ORDER — AMLODIPINE BESYLATE 5 MG/1
5 TABLET ORAL DAILY
Status: DISCONTINUED | OUTPATIENT
Start: 2023-05-17 | End: 2023-05-17 | Stop reason: HOSPADM

## 2023-05-16 RX ORDER — ESCITALOPRAM OXALATE 10 MG/1
20 TABLET ORAL DAILY
Status: DISCONTINUED | OUTPATIENT
Start: 2023-05-17 | End: 2023-05-17 | Stop reason: HOSPADM

## 2023-05-16 RX ORDER — DOCUSATE SODIUM 100 MG/1
100 CAPSULE, LIQUID FILLED ORAL 2 TIMES DAILY
Status: DISCONTINUED | OUTPATIENT
Start: 2023-05-16 | End: 2023-05-17 | Stop reason: HOSPADM

## 2023-05-16 RX ORDER — TALC
6 POWDER (GRAM) TOPICAL NIGHTLY PRN
Status: DISCONTINUED | OUTPATIENT
Start: 2023-05-16 | End: 2023-05-17 | Stop reason: HOSPADM

## 2023-05-16 RX ORDER — BENAZEPRIL HYDROCHLORIDE 20 MG/1
20 TABLET ORAL DAILY
Status: DISCONTINUED | OUTPATIENT
Start: 2023-05-17 | End: 2023-05-17 | Stop reason: HOSPADM

## 2023-05-16 RX ORDER — ONDANSETRON 2 MG/ML
4 INJECTION INTRAMUSCULAR; INTRAVENOUS EVERY 8 HOURS PRN
Status: DISCONTINUED | OUTPATIENT
Start: 2023-05-16 | End: 2023-05-17 | Stop reason: HOSPADM

## 2023-05-16 RX ADMIN — MORPHINE SULFATE 6 MG: 2 INJECTION, SOLUTION INTRAMUSCULAR; INTRAVENOUS at 03:05

## 2023-05-16 RX ADMIN — PREGABALIN 200 MG: 75 CAPSULE ORAL at 11:05

## 2023-05-16 RX ADMIN — DOCUSATE SODIUM 100 MG: 100 CAPSULE, LIQUID FILLED ORAL at 11:05

## 2023-05-16 RX ADMIN — MORPHINE SULFATE 4 MG: 4 INJECTION, SOLUTION INTRAMUSCULAR; INTRAVENOUS at 08:05

## 2023-05-16 NOTE — ED NOTES
Bed: 17  Expected date:   Expected time:   Means of arrival:   Comments:  EMS-Fall last night - no blood thinners

## 2023-05-16 NOTE — ED PROVIDER NOTES
Encounter Date: 5/16/2023       History     Chief Complaint   Patient presents with    Hip Pain     Left hip. Ash placed 2 years ago. Fell getting off toilet last night onto left side. NO LOC.     Fall     Loss of balance     77-year-old female was sitting on the toilet and was trying to get up and slipped and fell yesterday.  Patient complains of pain in the left hip area.  Patient also hit her head and hit the back of her head.  Did not lose consciousness.  Denies any chest pain or shortness of breath or any focal weakness or numbness.  Patient states it is painful when she walks.  Patient had previous hip surgery and concern.  Patient called EMS and he had to get checked as having pain in the left hip area patient had slight headache which now almost resolved.    Review of patient's allergies indicates:  No Known Allergies  Past Medical History:   Diagnosis Date    Anemia     Arthritis     Bleeding ulcer 07/2016    Chronic pain     DDD (degenerative disc disease), cervical     DDD (degenerative disc disease), lumbar     Depression     Disc degeneration, lumbosacral     Diverticulitis     Encounter for blood transfusion     Hypertension     IBS (irritable bowel syndrome)     Neuropathy of both feet     Seizures     none  since 2017    Umbilical hernia 2020     Past Surgical History:   Procedure Laterality Date    BACK SURGERY      BREAST BIOPSY      BREAST SURGERY Right     lumpectomy    CAUDAL EPIDURAL STEROID INJECTION N/A 01/28/2022    Procedure: Injection-steroid-epidural-caudal;  Surgeon: Luzmaria Marcelino MD;  Location: [facility] OR;  Service: Pain Management;  Laterality: N/A;    COLONOSCOPY N/A 01/14/2022    Procedure: COLONOSCOPY;  Surgeon: Abby Landers MD;  Location: [facility];  Service: Endoscopy;  Laterality: N/A;    ENDOSCOPIC ULTRASOUND OF UPPER GASTROINTESTINAL TRACT N/A 07/21/2020    Procedure: ULTRASOUND, UPPER GI TRACT, ENDOSCOPIC;  Surgeon: Marcio Nguyễn III, MD;  Location: [facility];   Service: Endoscopy;  Laterality: N/A;    ESOPHAGOGASTRODUODENOSCOPY N/A 01/14/2022    Procedure: EGD (ESOPHAGOGASTRODUODENOSCOPY);  Surgeon: Abby Landers MD;  Location: [facility] ENDO;  Service: Endoscopy;  Laterality: N/A;    ESOPHAGOGASTRODUODENOSCOPY N/A 06/10/2022    Procedure: EGD (ESOPHAGOGASTRODUODENOSCOPY);  Surgeon: Abby Landers MD;  Location: [facility] ENDO;  Service: Endoscopy;  Laterality: N/A;    EYE SURGERY      cataract    HYSTERECTOMY      INTRAMEDULLARY RODDING OF TROCHANTER OF FEMUR Left 12/11/2018    Procedure: INSERTION, INTRAMEDULLARY SANTOS, FEMUR, TROCHANTER;  Surgeon: Eulalio De La Cruz MD;  Location: [facility];  Service: Orthopedics;  Laterality: Left;    SPINAL CORD STIMULATOR IMPLANT  09/18/2013    and removal    SPINE SURGERY  2006    lumbar L2-S1 decompression.    SPINE SURGERY      cervical decompression    TONSILLECTOMY       Family History   Problem Relation Age of Onset    Diabetes Mother     Hypertension Mother     Irritable bowel syndrome Mother     Diabetes Father         insulin dependent    Hypertension Father     Heart disease Father     Coronary artery disease Father     Depression Father     Diabetes Sister     Diabetes Brother     COPD Brother      Social History     Tobacco Use    Smoking status: Never    Smokeless tobacco: Never   Substance Use Topics    Alcohol use: No    Drug use: No     Review of Systems   Constitutional: Negative.    HENT: Negative.     Eyes: Negative.    Respiratory: Negative.     Cardiovascular: Negative.  Negative for chest pain.   Gastrointestinal: Negative.    Endocrine: Negative.    Genitourinary: Negative.    Musculoskeletal: Negative.         Left hip pain   Skin: Negative.    Allergic/Immunologic: Negative.    Neurological: Negative.    Hematological: Negative.    Psychiatric/Behavioral: Negative.     All other systems reviewed and are negative.    Physical Exam     Initial Vitals   BP Pulse Resp Temp SpO2   05/16/23 1259 05/16/23 1259 05/16/23  1259 05/16/23 1303 05/16/23 1259   110/65 83 18 98.5 °F (36.9 °C) 98 %      MAP       --                Physical Exam    Nursing note and vitals reviewed.  Constitutional: She appears well-developed and well-nourished.   HENT:   Head: Normocephalic.   Nose: Nose normal.   Mouth/Throat: Oropharynx is clear and moist.   Very small occipital hematoma on the left   Eyes: Conjunctivae and EOM are normal. Pupils are equal, round, and reactive to light.   Neck: Neck supple. No thyromegaly present. No tracheal deviation present. No JVD present.   Normal range of motion.  Cardiovascular:  Normal rate, regular rhythm, normal heart sounds and intact distal pulses.           No murmur heard.  Pulmonary/Chest: Breath sounds normal. No stridor. No respiratory distress. She has no wheezes. She has no rales.   Abdominal: Abdomen is soft. Bowel sounds are normal. She exhibits no distension. There is no abdominal tenderness.   Musculoskeletal:         General: Tenderness present. No edema. Normal range of motion.      Cervical back: Normal range of motion and neck supple.      Comments: Tenderness of the left hip region.  Left lower extremity is neurovascularly intact with intact distal pulses with intact dorsalis pedis pulse.  Patient has tenderness mostly in the left hip region.     Neurological: She is alert and oriented to person, place, and time. She has normal strength. No cranial nerve deficit or sensory deficit. GCS score is 15. GCS eye subscore is 4. GCS verbal subscore is 5. GCS motor subscore is 6.   Skin: Skin is warm. Capillary refill takes less than 2 seconds.   Psychiatric: She has a normal mood and affect. Thought content normal.       ED Course   Procedures  Labs Reviewed   CBC W/ AUTO DIFFERENTIAL   COMPREHENSIVE METABOLIC PANEL   URINALYSIS, REFLEX TO URINE CULTURE          Imaging Results              CT Pelvis Without Contrast (Final result)  Result time 05/16/23 16:47:58      Final result by Mekhi Kwon MD  (05/16/23 16:47:58)                   Narrative:    CMS MANDATED QUALITY DATA-CT RADIATION DOSE-436  All CT scans at this facility use dose modulation, iterative reconstruction, and or weight-based dosing when appropriate to reduce radiation dose to as low as reasonably achievable. Unless otherwise stated, incidental findings do not require dedicated follow-up imaging.    HISTORY: Pelvic trauma    FINDINGS: Axial noncontrast imaging was performed, with sagittal and coronal reformatted images reviewed. There is an acute oblique nondisplaced fracture through the anterior column of the left acetabulum, which involves the acetabular articular surface. There is also an acute comminuted nondisplaced fracture of the left inferior pubic ramus. There are no other acute fractures.    There is a remote healed intertrochanteric left femoral fracture, post ORIF with antegrade intramedullary nail and bladed femoral neck screw, with no findings of hardware fracture or loosening. There is a remote healed fracture of the left and right pubis, with degenerative narrowing of the hip joint spaces and sacroiliac joints. The bones are diffusely osteopenic.    The unenhanced musculature is unremarkable. There is no evidence of acute intrapelvic or pelvic retroperitoneal hemorrhage, with no soft tissue hematoma. The uterus is surgically absent. There are a few calcified pelvic phleboliths, with the unenhanced rectum and urinary bladder are unremarkable. Stranding reflecting edema within the visualized lower abdominal mesentery is nonspecific.    IMPRESSION:  1. Acute nondisplaced fracture of the anterior column left acetabulum, with acute comminuted left inferior pubic ramus fracture.  2. Additional observations as described.    Electronically signed by:  Mekhi Kwon MD  5/16/2023 4:47 PM CDT Workstation: [id number]                                     CT Head Without Contrast (Final result)  Result time 05/16/23 16:36:34      Final result  by Sabas Anand MD (05/16/23 16:36:34)                   Narrative:    EXAM DESCRIPTION: CT HEAD WITHOUT CONTRAST    CLINICAL HISTORY: 77 years Female, Head trauma, minor (Age >= 65y)    COMPARISON: November 30, 2022.    TECHNIQUE: Images of the brain were obtained without the administration of intravenous contrast. All CT scans at this facility utilized dose modulation, iterative reconstruction, and/or weightbase dosing when appropriate to reduce the radiation dose to his low as reasonably achievable. 57.50 mGy.    FINDINGS: Some age related volume loss is again noted. No evidence of a mass lesion or hemorrhage can be seen. Decreased attenuation is seen in the white matter of the brain. The calvarium appears to be intact. The visualized portions of the sinuses and mastoids are unremarkable.    IMPRESSION:  No evidence of any intracranial hemorrhage can be seen.    Electronically signed by:  Sabas Anand MD  5/16/2023 4:36 PM CDT Workstation: [id number]                                     X-Ray Hip 2 or 3 views Left (with Pelvis when performed) (Final result)  Result time 05/16/23 13:47:44      Final result by Bryan Stevens MD (05/16/23 13:47:44)                   Narrative:    Reason: pain Fall this morning, left hip pain    FINDINGS:  AP pelvis and 2 views of left hip are limited by patient habitus and the lateral view, resulting in limited visualization of the left hemipelvis and proximal femur. No acute fracture, dislocation or destructive osseous lesion identified. Short stem intramedullary nail transfixed with proximal helical blade and distal interlocking screw shows no loosening or other complication. Bilateral hip joint spaces are maintained. Pubic symphysis and sacroiliac joints are maintained. Chronic osseous remodeling of right pubic body unchanged from 11/30/2022 CT, suggesting old healed fracture. Convex left lumbar spine curvature partially visualized with multilevel disc degeneration noted. Soft  tissues are unremarkable.    IMPRESSION:  No acute left hip abnormality identified, with evaluation limited.    Electronically signed by:  Bryan Stevens MD  5/16/2023 1:47 PM CDT Workstation: [id number]                                     Medications   morphine injection 4 mg (has no administration in time range)   morphine injection 6 mg (6 mg Intramuscular Given 5/16/23 4743)     Medical Decision Making:   Differential Diagnosis:   77-year-old female with left hip pain with tenderness in that area and has persistent pain.  X-ray did not show obvious fracture however CT scan does show evidence of acetabular fracture and pubic ramus fracture.  Case discussed with orthopedic surgeon who agrees with admission and pain control and will follow patient.  Hospital medicine consult for evaluation for admission and further management and pain control.  Orthopedics to managed orthopedic injury.  Independently Interpreted Test(s):   I have ordered and independently interpreted X-rays - see prior notes.  Clinical Tests:   Lab Tests: Reviewed  Radiological Study: Reviewed  Other:   I have discussed this case with another health care provider.       <> Summary of the Discussion: Case discussed with orthopedic surgeon and hospital medicine consult.                        Clinical Impression:   Final diagnoses:  [M25.552] Left hip pain (Primary)  [S32.415A] Closed nondisplaced fracture of anterior wall of left acetabulum, initial encounter        ED Disposition Condition    Admit Stable                Christiana Matos MD  05/16/23 2001

## 2023-05-17 ENCOUNTER — TELEPHONE (OUTPATIENT)
Dept: FAMILY MEDICINE | Facility: CLINIC | Age: 78
End: 2023-05-17
Payer: MEDICARE

## 2023-05-17 VITALS
TEMPERATURE: 98 F | RESPIRATION RATE: 16 BRPM | DIASTOLIC BLOOD PRESSURE: 83 MMHG | HEIGHT: 66 IN | BODY MASS INDEX: 24.8 KG/M2 | OXYGEN SATURATION: 95 % | HEART RATE: 90 BPM | SYSTOLIC BLOOD PRESSURE: 140 MMHG | WEIGHT: 154.31 LBS

## 2023-05-17 LAB
ANION GAP SERPL CALC-SCNC: 6 MMOL/L (ref 8–16)
BUN SERPL-MCNC: 13 MG/DL (ref 8–23)
CALCIUM SERPL-MCNC: 8.7 MG/DL (ref 8.7–10.5)
CHLORIDE SERPL-SCNC: 107 MMOL/L (ref 95–110)
CO2 SERPL-SCNC: 23 MMOL/L (ref 23–29)
CREAT SERPL-MCNC: 0.6 MG/DL (ref 0.5–1.4)
ERYTHROCYTE [DISTWIDTH] IN BLOOD BY AUTOMATED COUNT: 14.6 % (ref 11.5–14.5)
EST. GFR  (NO RACE VARIABLE): >60 ML/MIN/1.73 M^2
GLUCOSE SERPL-MCNC: 107 MG/DL (ref 70–110)
HCT VFR BLD AUTO: 26.2 % (ref 37–48.5)
HGB BLD-MCNC: 8 G/DL (ref 12–16)
MAGNESIUM SERPL-MCNC: 1.9 MG/DL (ref 1.6–2.6)
MCH RBC QN AUTO: 24.8 PG (ref 27–31)
MCHC RBC AUTO-ENTMCNC: 30.5 G/DL (ref 32–36)
MCV RBC AUTO: 81 FL (ref 82–98)
PLATELET # BLD AUTO: 321 K/UL (ref 150–450)
PMV BLD AUTO: 9.4 FL (ref 9.2–12.9)
POTASSIUM SERPL-SCNC: 3.9 MMOL/L (ref 3.5–5.1)
RBC # BLD AUTO: 3.23 M/UL (ref 4–5.4)
SODIUM SERPL-SCNC: 136 MMOL/L (ref 136–145)
WBC # BLD AUTO: 5.91 K/UL (ref 3.9–12.7)

## 2023-05-17 PROCEDURE — G0378 HOSPITAL OBSERVATION PER HR: HCPCS

## 2023-05-17 PROCEDURE — 97535 SELF CARE MNGMENT TRAINING: CPT

## 2023-05-17 PROCEDURE — 80048 BASIC METABOLIC PNL TOTAL CA: CPT | Performed by: STUDENT IN AN ORGANIZED HEALTH CARE EDUCATION/TRAINING PROGRAM

## 2023-05-17 PROCEDURE — 63600175 PHARM REV CODE 636 W HCPCS: Performed by: STUDENT IN AN ORGANIZED HEALTH CARE EDUCATION/TRAINING PROGRAM

## 2023-05-17 PROCEDURE — 83735 ASSAY OF MAGNESIUM: CPT | Performed by: STUDENT IN AN ORGANIZED HEALTH CARE EDUCATION/TRAINING PROGRAM

## 2023-05-17 PROCEDURE — 25000003 PHARM REV CODE 250: Performed by: INTERNAL MEDICINE

## 2023-05-17 PROCEDURE — 97165 OT EVAL LOW COMPLEX 30 MIN: CPT

## 2023-05-17 PROCEDURE — 97116 GAIT TRAINING THERAPY: CPT

## 2023-05-17 PROCEDURE — 25000003 PHARM REV CODE 250: Performed by: STUDENT IN AN ORGANIZED HEALTH CARE EDUCATION/TRAINING PROGRAM

## 2023-05-17 PROCEDURE — 85027 COMPLETE CBC AUTOMATED: CPT | Performed by: STUDENT IN AN ORGANIZED HEALTH CARE EDUCATION/TRAINING PROGRAM

## 2023-05-17 PROCEDURE — 96376 TX/PRO/DX INJ SAME DRUG ADON: CPT

## 2023-05-17 PROCEDURE — 36415 COLL VENOUS BLD VENIPUNCTURE: CPT | Performed by: STUDENT IN AN ORGANIZED HEALTH CARE EDUCATION/TRAINING PROGRAM

## 2023-05-17 PROCEDURE — 97162 PT EVAL MOD COMPLEX 30 MIN: CPT

## 2023-05-17 RX ORDER — OXYCODONE AND ACETAMINOPHEN 10; 325 MG/1; MG/1
1 TABLET ORAL EVERY 6 HOURS PRN
Qty: 28 TABLET | Refills: 0 | Status: SHIPPED | OUTPATIENT
Start: 2023-05-17 | End: 2023-05-24

## 2023-05-17 RX ORDER — OXYCODONE AND ACETAMINOPHEN 7.5; 325 MG/1; MG/1
1 TABLET ORAL EVERY 4 HOURS PRN
Status: DISCONTINUED | OUTPATIENT
Start: 2023-05-17 | End: 2023-05-17 | Stop reason: HOSPADM

## 2023-05-17 RX ADMIN — MORPHINE SULFATE 2 MG: 2 INJECTION, SOLUTION INTRAMUSCULAR; INTRAVENOUS at 08:05

## 2023-05-17 RX ADMIN — POLYETHYLENE GLYCOL 3350 17 G: 17 POWDER, FOR SOLUTION ORAL at 08:05

## 2023-05-17 RX ADMIN — ESCITALOPRAM OXALATE 20 MG: 10 TABLET ORAL at 08:05

## 2023-05-17 RX ADMIN — OXYCODONE HYDROCHLORIDE AND ACETAMINOPHEN 1 TABLET: 7.5; 325 TABLET ORAL at 09:05

## 2023-05-17 RX ADMIN — BENAZEPRIL HYDROCHLORIDE 20 MG: 20 TABLET ORAL at 08:05

## 2023-05-17 RX ADMIN — PREGABALIN 200 MG: 75 CAPSULE ORAL at 08:05

## 2023-05-17 RX ADMIN — AMLODIPINE BESYLATE 5 MG: 5 TABLET ORAL at 08:05

## 2023-05-17 RX ADMIN — DOCUSATE SODIUM 100 MG: 100 CAPSULE, LIQUID FILLED ORAL at 08:05

## 2023-05-17 NOTE — PROGRESS NOTES
Full consult to follow.  X-ray and CT imaging was reviewed.  Fracture is likely non operative as it is totally nondisplaced.  recommend physical therapy with partial weight-bearing left lower extremity with a rolling walker if possible.  If the patient can not comply with partial weight-bearing, weight-bearing as tolerated can be initiated.  Patient might benefit from skilled nursing or inpatient rehab until independent self care is re-established.  No surgery needed at this time.  we will check another image in about a week to make sure the fracture is stable and no displacement.

## 2023-05-17 NOTE — PT/OT/SLP EVAL
Physical Therapy Evaluation and Discharge Note    Patient Name:  Froilan Ray   MRN:  [id number]    Recommendations:     Discharge Recommendations: home health OT, home, home health PT  Discharge Equipment Recommendations:     Barriers to discharge: None    Assessment:     Froilan Ray is a 77 y.o. female admitted with a medical diagnosis of Closed fracture of acetabulum. .  At this time, patient is  requiring Min A for  bed mobility and t/f's  and CGA with gait with RW. Pt will have the assistance of her  and son and HH services upon D/C home.  Pt lives in mobile home with 4 step entry, but pt's  stated he will use W/C for house entry.    Recent Surgery: * No surgery found *      Plan:     During this hospitalization, patient does not require further acute PT services.  Please re-consult if situation changes.      Subjective     Chief Complaint:  none   Patient/Family Comments/goals: D/C home   Pain/Comfort:       Patients cultural, spiritual, Christian conflicts given the current situation:      Living Environment:  Pt lives with her  in a mobile home   Prior to admission, patients level of function was Mod I with RW.  Equipment used at home:  .  DME owned (not currently used): none.  Upon discharge, patient will have assistance from her .    Objective:     Communicated with nurse prior to session.  Patient found supine with   upon PT entry to room.    General Precautions: Standard,      Orthopedic Precautions:    Braces:    Respiratory Status: Room air    Exams:  Cognitive Exam:  Patient is oriented to Person, Place, Time, and Situation  RLE ROM: WFL  RLE Strength: WFL  LLE ROM: WFL  LLE Strength: WFL    Functional Mobility:  Bed Mobility:     Supine to Sit: stand by assistance  Sit to Supine: minimum assistance  Transfers:     Sit to Stand:  contact guard assistance with rolling walker  Gait: 80 ft RW CGA PWB on L LE    AM-PAC 6 CLICK MOBILITY  Total Score:        Treatment  and Education:   PT eval and D/C .  Gait training on level surface. Attempted negotiation of 2 steps  using 1 hand  rail for lateral step up.  This was before pt explained the use of W/C for  negotiating steps.Pt  had difficulty with Mod A , so pt returned to W/C for return to room.      AM-PAC 6 CLICK MOBILITY  Total Score:      Patient left supine with all lines intact, call button in reach, and bed alarm on.    GOALS:   Multidisciplinary Problems       Physical Therapy Goals       Not on file                    History:     Past Medical History:   Diagnosis Date    Anemia     Arthritis     Bleeding ulcer 07/2016    Chronic pain     DDD (degenerative disc disease), cervical     DDD (degenerative disc disease), lumbar     Depression     Disc degeneration, lumbosacral     Diverticulitis     Encounter for blood transfusion     Hypertension     IBS (irritable bowel syndrome)     Neuropathy of both feet     Seizures     none  since 2017    Umbilical hernia 2020       Past Surgical History:   Procedure Laterality Date    BACK SURGERY      BREAST BIOPSY      BREAST SURGERY Right     lumpectomy    CAUDAL EPIDURAL STEROID INJECTION N/A 01/28/2022    Procedure: Injection-steroid-epidural-caudal;  Surgeon: Luzmaria Marcelino MD;  Location: [facility] OR;  Service: Pain Management;  Laterality: N/A;    COLONOSCOPY N/A 01/14/2022    Procedure: COLONOSCOPY;  Surgeon: Abby Landers MD;  Location: [facility];  Service: Endoscopy;  Laterality: N/A;    ENDOSCOPIC ULTRASOUND OF UPPER GASTROINTESTINAL TRACT N/A 07/21/2020    Procedure: ULTRASOUND, UPPER GI TRACT, ENDOSCOPIC;  Surgeon: Marcio Nguyễn III, MD;  Location: [facility];  Service: Endoscopy;  Laterality: N/A;    ESOPHAGOGASTRODUODENOSCOPY N/A 01/14/2022    Procedure: EGD (ESOPHAGOGASTRODUODENOSCOPY);  Surgeon: Abby Landers MD;  Location: [facility];  Service: Endoscopy;  Laterality: N/A;    ESOPHAGOGASTRODUODENOSCOPY N/A 06/10/2022    Procedure: EGD  (ESOPHAGOGASTRODUODENOSCOPY);  Surgeon: Abby Landers MD;  [facility];  Service: Endoscopy;  Laterality: N/A;    EYE SURGERY      cataract    HYSTERECTOMY      INTRAMEDULLARY RODDING OF TROCHANTER OF FEMUR Left 12/11/2018    Procedure: INSERTION, INTRAMEDULLARY SANTOS, FEMUR, TROCHANTER;  Surgeon: Eulalio De La Cruz MD;  Location: [facility];  Service: Orthopedics;  Laterality: Left;    SPINAL CORD STIMULATOR IMPLANT  09/18/2013    and removal    SPINE SURGERY  2006    lumbar L2-S1 decompression.    SPINE SURGERY      cervical decompression    TONSILLECTOMY         Time Tracking:     PT Received On: 05/17/23  PT Start Time: 0910     PT Stop Time: 0940  PT Total Time (min): 30 min     Billable Minutes: Evaluation 10 minutes  and Gait Training 10 minutes      05/17/2023

## 2023-05-17 NOTE — DISCHARGE SUMMARY
[facility]  Discharge Summary  Patient Name: Froilan Ray MRN: [id number]   Patient Class: OP- Observation  Length of Stay: 0   Admission Date: 5/16/2023 12:44 PM Attending Physician: Symone Tavares MD   Primary Care Provider: Alphonse Boothe III, MD Face-to-Face encounter date: 05/17/2023   Chief Complaint: Hip Pain (Left hip. Ash placed 2 years ago. Fell getting off toilet last night onto left side. NO LOC. ) and Fall (Loss of balance)    Date of Discharge: 5/17/2023  [place] [facility]: Stable       Reason for Hospitalization     Active Hospital Problems    Diagnosis    *Closed fracture of acetabulum    ACP (advance care planning)    Essential hypertension         Brief History of Present Illness    Froilan Ray is a 77 y.o.  female who  has a past medical history of Anemia, Arthritis, Bleeding ulcer (07/2016), Chronic pain, DDD (degenerative disc disease), cervical, DDD (degenerative disc disease), lumbar, Depression, Disc degeneration, lumbosacral, Diverticulitis, Encounter for blood transfusion, Hypertension, IBS (irritable bowel syndrome), Neuropathy of both feet, Seizures, and Umbilical hernia (2020).. The patient presented to [facility] on 5/16/2023 with a primary complaint of Hip Pain (Left hip. Ash placed 2 years ago. Fell getting off toilet last night onto left side. NO LOC. ) and Fall (Loss of balance)  .     For the full HPI please refer to the History & Physical from this admission.    Hospital Course By Problem with Pertinent Findings     Patient admitted for acetabulum fracture. As per ortho fracture is likely non operative as it is totally nondisplaced. Conservative therapy with follow up in one week recommended. PT and OT saw the patient and recommended home health PT and OT which has been arranged and patient will be discharged home.       Patient was seen and examined on the date of discharge and determined to be  "suitable for discharge.    Physical Exam  BP (!) 140/83   Pulse 90   Temp 98.3 °F (36.8 °C) (Oral)   Resp 16   Ht 5' 5.98" (1.676 m)   Wt 70 kg (154 lb 5.2 oz)   SpO2 95%   Breastfeeding No   BMI 24.92 kg/m²   Vitals reviewed.    Constitutional: No distress.   HENT: Atraumatic.   Cardiovascular: Normal rate, regular rhythm and normal heart sounds.   Pulmonary/Chest: Effort normal. Clear to auscultation bilaterally. No wheezes.   Abdominal: Soft. Bowel sounds are normal. Exhibits no distension and no mass. No tenderness  Neurological: Alert.   Skin: Skin is warm and dry.     Following labs were Reviewed   Recent Labs   Lab 05/16/23 2010 05/17/23  0505   WBC 6.75 5.91   HGB 8.5* 8.0*   HCT 28.5* 26.2*    321   CALCIUM 8.9 8.7   ALBUMIN 3.6  --    PROT 6.6  --    * 136   K 4.0 3.9   CO2 26 23    107   BUN 14 13   CREATININE 0.8 0.6   ALKPHOS 73  --    ALT 14  --    AST 18  --    BILITOT 0.5  --      No results found for: POCTGLUCOSE     All labs within the past 24 hours have been reviewed    Microbiology Results (last 7 days)       ** No results found for the last 168 hours. **          CT Pelvis Without Contrast   Final Result      CT Head Without Contrast   Final Result      X-Ray Hip 2 or 3 views Left (with Pelvis when performed)   Final Result          No results found for this or any previous visit.      Consultants and Procedures   Consultants:  Consults (From admission, onward)          Status Ordering Provider     Inpatient consult to Hospitalist  Once        Provider:  Carrol Almendarez MD    Acknowledged CARROL ALMENDAREZ     Inpatient consult to Orthopedic Surgery  Once        Provider:  Ronan Ramos MD    Acknowledged CARROL ALMENDAREZ            Procedures:   * No surgery found *     Discharge Information:   Diet:  Resume regular diet    Physical Activity:  Activity as tolerated    Instructions:  1. Take all medications as prescribed  2. Keep all follow-up " appointments  3. Return to the hospital or call your primary care physicians if any worsening symptoms such as fall, dizziness, weakness occur.    Follow-Up Appointments:  Please call your primary care physician to schedule an appointment in 1 week time.     Follow-up Information       Alphonse Boothe III, MD Follow up in 1 week(s).    Specialty: Family Medicine  Contact information:  [address]  [address]  [place] [address]  [phone]               Ronan Ramos MD Follow up in 1 week(s).    Specialties: Sports Medicine, Orthopedic Surgery  Contact information:  [address] [place] [address] [address]  [place] [address]  [phone]                               Pending laboratory work/Tests to be performed/followed by the Primary care Physician: None    The patient was discharged in the care of her parents//wife/family/caregiver, with discharge instructions were reviewed in written and verbal form. All pertinent questions were discussed and prescriptions were provided. The importance of making follow up appointments and compliance of medications has been stressed repeatedly. The patient will follow up in 1 week or sooner as needed with the PCP, and the patient is on board with the plan. Upon discharge, patient needs to be on following medications.    Discharge Medications:     Medication List        CHANGE how you take these medications      amlodipine-benazepril 5-20 mg 5-20 mg per capsule  Commonly known as: LOTREL  TAKE 1 CAPSULE BY MOUTH EVERY DAY  What changed: when to take this     cyclobenzaprine 10 MG tablet  Commonly known as: FLEXERIL  TAKE 1 TABLET(10 MG) BY MOUTH THREE TIMES DAILY AS NEEDED FOR MUSCLE SPASMS  What changed:   when to take this  reasons to take this     furosemide 40 MG tablet  Commonly known as: LASIX  TAKE 1 TABLET(40 MG) BY MOUTH DAILY AS NEEDED FOR SWELLING  What changed: See the new instructions.     oxyCODONE-acetaminophen  mg per tablet  Commonly known as:  PERCOCET  Take 1 tablet by mouth every 6 (six) hours as needed for Pain. Take one tablet by mouth every 4 to 6 hours as needed for pain  What changed:   how much to take  how to take this  when to take this  reasons to take this     potassium chloride 10 MEQ Tbsr  Commonly known as: KLOR-CON  TAKE 1 TABLET(10 MEQ) BY MOUTH EVERY DAY  What changed: when to take this     pregabalin 200 MG Cap  Commonly known as: LYRICA  TAKE 1 CAPSULE(200 MG) BY MOUTH THREE TIMES DAILY  What changed:   how much to take  how to take this  when to take this            CONTINUE taking these medications      acetaminophen 325 MG tablet  Commonly known as: TYLENOL     alendronate 70 MG tablet  Commonly known as: FOSAMAX  Take 1 tablet (70 mg total) by mouth every 7 days.     ascorbic acid (vitamin C) 250 MG tablet  Commonly known as: VITAMIN C  Take 1 tablet (250 mg total) by mouth once daily.     cyanocobalamin 100 MCG tablet  Commonly known as: VITAMIN B-12  Take 1 tablet (100 mcg total) by mouth once daily.     docusate sodium 100 MG capsule  Commonly known as: COLACE  Take 1 capsule (100 mg total) by mouth 2 (two) times daily.     EScitalopram oxalate 20 MG tablet  Commonly known as: LEXAPRO  Take 1 tablet (20 mg total) by mouth once daily.     ferrous sulfate 325 (65 FE) MG EC tablet  Take 1 tablet (325 mg total) by mouth once daily.     omeprazole 40 MG capsule  Commonly known as: PRILOSEC  Take 1 capsule (40 mg total) by mouth every morning.     polyethylene glycol 17 gram Pwpk  Commonly known as: GLYCOLAX  Take 17 g by mouth 2 (two) times daily.            ASK your doctor about these medications      amiodarone 200 MG Tab  Commonly known as: PACERONE  Take 1 tablet (200 mg total) by mouth once daily.     apixaban 2.5 mg Tab  Commonly known as: ELIQUIS  Take 1 tablet (2.5 mg total) by mouth 2 (two) times daily.     metoprolol tartrate 50 MG tablet  Commonly known as: LOPRESSOR  Take 1 tablet (50 mg total) by mouth 3 (three) times  daily.               Where to Get Your Medications        These medications were sent to [id number] - [place], [place] - [address] [facility]  [address]  [place] [address] [address]      Phone: [phone]   oxyCODONE-acetaminophen  mg per tablet           I spent 30 minutes preparing the discharge including reviewing records from previous encounters, preparation of discharge summary, assessing and final examination of the patient, discharge medicine reconciliation, discussing plan of care, follow up and education and prescriptions.       Symone Tavares  [facility] Hospitalist  05/17/2023

## 2023-05-17 NOTE — ASSESSMENT & PLAN NOTE
Patient with MICHAEL stating full code , confirmed with patient  that she wishes to be full code

## 2023-05-17 NOTE — HPI
77-year-old  female with history of hypertension and degenerative disease presents to the ED on account of pelvic fracture secondary to fall.  Patient states that she was trying to get up from her bedside commode when she slipped and fell yesterday.  Patient complains of pain in the left hip area.  Patient also hit the back of her head however did not lose consciousness.  She denies being on any blood thinners.  She denies any seizures, unilateral weakness of extremities, chest pain, shortness a breath or any bleeding from any orifices.  Patient states that it is associated with pain during ambulation.  She has had previous hip surgery and was concerned hence presentation to the ED.  She does not smoke cigarettes, drink alcohol or use any illicit medications.  On presentation to the ED, CT pelvis showed Acute nondisplaced fracture of the anterior column left acetabulum, with acute comminuted left inferior pubic ramus fracture.  Orthopedic surgeon consulted.

## 2023-05-17 NOTE — H&P
[facility] - Emergency Dept  Hospital Medicine  History & Physical    Patient Name: Froilan Ray  MRN: [id number]  Patient Class: OP- Observation  Admission Date: 5/16/2023  Attending Physician: Carrol Almendarez MD  Primary Care Provider: Alphonse Boothe III, MD         Patient information was obtained from patient and ER records.     Subjective:     Principal Problem:Closed fracture of acetabulum    Chief Complaint:   Chief Complaint   Patient presents with    Hip Pain     Left hip. Ash placed 2 years ago. Fell getting off toilet last night onto left side. NO LOC.     Fall     Loss of balance        HPI: 77-year-old  female with history of hypertension and degenerative disease presents to the ED on account of pelvic fracture secondary to fall.  Patient states that she was trying to get up from her bedside commode when she slipped and fell yesterday.  Patient complains of pain in the left hip area.  Patient also hit the back of her head however did not lose consciousness.  She denies being on any blood thinners.  She denies any seizures, unilateral weakness of extremities, chest pain, shortness a breath or any bleeding from any orifices.  Patient states that it is associated with pain during ambulation.  She has had previous hip surgery and was concerned hence presentation to the ED.  She does not smoke cigarettes, drink alcohol or use any illicit medications.  On presentation to the ED, CT pelvis showed Acute nondisplaced fracture of the anterior column left acetabulum, with acute comminuted left inferior pubic ramus fracture.  Orthopedic surgeon consulted.      Past Medical History:   Diagnosis Date    Anemia     Arthritis     Bleeding ulcer 07/2016    Chronic pain     DDD (degenerative disc disease), cervical     DDD (degenerative disc disease), lumbar     Depression     Disc degeneration, lumbosacral     Diverticulitis     Encounter for blood transfusion     Hypertension      IBS (irritable bowel syndrome)     Neuropathy of both feet     Seizures     none  since 2017    Umbilical hernia 2020       Past Surgical History:   Procedure Laterality Date    BACK SURGERY      BREAST BIOPSY      BREAST SURGERY Right     lumpectomy    CAUDAL EPIDURAL STEROID INJECTION N/A 01/28/2022    Procedure: Injection-steroid-epidural-caudal;  Surgeon: Luzmaria Marcelino MD;  Location: [facility] OR;  Service: Pain Management;  Laterality: N/A;    COLONOSCOPY N/A 01/14/2022    Procedure: COLONOSCOPY;  Surgeon: Abby Landers MD;  Location: [facility] ENDO;  Service: Endoscopy;  Laterality: N/A;    ENDOSCOPIC ULTRASOUND OF UPPER GASTROINTESTINAL TRACT N/A 07/21/2020    Procedure: ULTRASOUND, UPPER GI TRACT, ENDOSCOPIC;  Surgeon: Marcio Nguyễn III, MD;  Location: [facility] ENDO;  Service: Endoscopy;  Laterality: N/A;    ESOPHAGOGASTRODUODENOSCOPY N/A 01/14/2022    Procedure: EGD (ESOPHAGOGASTRODUODENOSCOPY);  Surgeon: Abby Landers MD;  Location: [facility] ENDO;  Service: Endoscopy;  Laterality: N/A;    ESOPHAGOGASTRODUODENOSCOPY N/A 06/10/2022    Procedure: EGD (ESOPHAGOGASTRODUODENOSCOPY);  Surgeon: Abby Landers MD;  Location: [facility] ENDO;  Service: Endoscopy;  Laterality: N/A;    EYE SURGERY      cataract    HYSTERECTOMY      INTRAMEDULLARY RODDING OF TROCHANTER OF FEMUR Left 12/11/2018    Procedure: INSERTION, INTRAMEDULLARY SANTOS, FEMUR, TROCHANTER;  Surgeon: Eulalio De La Cruz MD;  Location: [facility] OR;  Service: Orthopedics;  Laterality: Left;    SPINAL CORD STIMULATOR IMPLANT  09/18/2013    and removal    SPINE SURGERY  2006    lumbar L2-S1 decompression.    SPINE SURGERY      cervical decompression    TONSILLECTOMY         Review of patient's allergies indicates:  No Known Allergies    No current facility-administered medications on file prior to encounter.     Current Outpatient Medications on File Prior to Encounter   Medication Sig    amlodipine-benazepril 5-20 mg (LOTREL) 5-20 mg per  capsule TAKE 1 CAPSULE BY MOUTH EVERY DAY (Patient taking differently: Take 1 capsule by mouth once daily.)    ascorbic acid, vitamin C, (VITAMIN C) 250 MG tablet Take 1 tablet (250 mg total) by mouth once daily.    EScitalopram oxalate (LEXAPRO) 20 MG tablet Take 1 tablet (20 mg total) by mouth once daily.    oxyCODONE-acetaminophen (PERCOCET)  mg per tablet Take one tablet by mouth every 4 to 6 hours as needed for pain (Patient taking differently: Take 1 tablet by mouth every 4 to 6 hours as needed. Take one tablet by mouth every 4 to 6 hours as needed for pain)    potassium chloride (KLOR-CON) 10 MEQ TbSR TAKE 1 TABLET(10 MEQ) BY MOUTH EVERY DAY (Patient taking differently: Take 10 mEq by mouth once daily.)    pregabalin (LYRICA) 200 MG Cap TAKE 1 CAPSULE(200 MG) BY MOUTH THREE TIMES DAILY (Patient taking differently: Take 200 mg by mouth 3 (three) times daily. TAKE 1 CAPSULE(200 MG) BY MOUTH THREE TIMES DAILY)    acetaminophen (TYLENOL) 325 MG tablet Take 650 mg by mouth every 8 (eight) hours as needed.    alendronate (FOSAMAX) 70 MG tablet Take 1 tablet (70 mg total) by mouth every 7 days.    amiodarone (PACERONE) 200 MG Tab Take 1 tablet (200 mg total) by mouth once daily. (Patient not taking: Reported on 5/16/2023)    apixaban (ELIQUIS) 2.5 mg Tab Take 1 tablet (2.5 mg total) by mouth 2 (two) times daily. (Patient not taking: Reported on 5/16/2023)    cyanocobalamin (VITAMIN B-12) 100 MCG tablet Take 1 tablet (100 mcg total) by mouth once daily.    cyclobenzaprine (FLEXERIL) 10 MG tablet TAKE 1 TABLET(10 MG) BY MOUTH THREE TIMES DAILY AS NEEDED FOR MUSCLE SPASMS (Patient taking differently: Take 10 mg by mouth 3 (three) times daily as needed.)    docusate sodium (COLACE) 100 MG capsule Take 1 capsule (100 mg total) by mouth 2 (two) times daily.    ferrous sulfate 325 (65 FE) MG EC tablet Take 1 tablet (325 mg total) by mouth once daily.    furosemide (LASIX) 40 MG tablet TAKE 1 TABLET(40  MG) BY MOUTH DAILY AS NEEDED FOR SWELLING (Patient taking differently: Take 40 mg by mouth daily as needed.)    metoprolol tartrate (LOPRESSOR) 50 MG tablet Take 1 tablet (50 mg total) by mouth 3 (three) times daily. (Patient not taking: Reported on 5/16/2023)    omeprazole (PRILOSEC) 40 MG capsule Take 1 capsule (40 mg total) by mouth every morning.    polyethylene glycol (GLYCOLAX) 17 gram PwPk Take 17 g by mouth 2 (two) times daily.    [DISCONTINUED] amoxicillin-clavulanate 875-125mg (AUGMENTIN) 875-125 mg per tablet Take 1 tablet by mouth 2 (two) times daily.    [DISCONTINUED] pantoprazole (PROTONIX) 40 MG tablet Take 1 tablet (40 mg total) by mouth 2 (two) times daily.    [DISCONTINUED] triamcinolone acetonide 0.1% (KENALOG) 0.1 % ointment Apply topically 2 (two) times daily.     Family History       Problem Relation (Age of Onset)    COPD Brother    Coronary artery disease Father    Depression Father    Diabetes Mother, Father, Sister, Brother    Heart disease Father    Hypertension Mother, Father    Irritable bowel syndrome Mother          Tobacco Use    Smoking status: Never    Smokeless tobacco: Never   Substance and Sexual Activity    Alcohol use: No    Drug use: No    Sexual activity: Not Currently     Review of Systems   Constitutional:  Negative for fatigue and fever.   Respiratory:  Negative for shortness of breath.    Cardiovascular:  Negative for palpitations.   Gastrointestinal:  Negative for abdominal pain, nausea and vomiting.   Musculoskeletal:  Positive for arthralgias and gait problem.   Neurological:  Positive for weakness.   All other systems reviewed and are negative.  Objective:     Vital Signs (Most Recent):  Temp: 98.5 °F (36.9 °C) (05/16/23 1303)  Pulse: 92 (05/16/23 2037)  Resp: 20 (05/16/23 2037)  BP: 139/68 (05/16/23 1800)  SpO2: 95 % (05/16/23 2037) Vital Signs (24h Range):  Temp:  [98.5 °F (36.9 °C)] 98.5 °F (36.9 °C)  Pulse:  [79-92] 92  Resp:  [18-22] 20  SpO2:  [93  %-99 %] 95 %  BP: (110-156)/(62-73) 139/68     Weight: 68 kg (150 lb)  Body mass index is 24.21 kg/m².     Physical Exam  Vitals and nursing note reviewed.   HENT:      Head: Normocephalic.      Mouth/Throat:      Mouth: Mucous membranes are moist.   Eyes:      Pupils: Pupils are equal, round, and reactive to light.   Cardiovascular:      Rate and Rhythm: Normal rate.   Pulmonary:      Effort: Pulmonary effort is normal.   Abdominal:      Palpations: Abdomen is soft.   Musculoskeletal:         General: Tenderness present.      Cervical back: Normal range of motion.   Neurological:      Mental Status: She is alert. Mental status is at baseline.   Psychiatric:         Mood and Affect: Mood normal.            CRANIAL NERVES     CN III, IV, VI   Pupils are equal, round, and reactive to light.     Significant Labs: All pertinent labs within the past 24 hours have been reviewed.  CBC:   Recent Labs   Lab 05/16/23 2010   WBC 6.75   HGB 8.5*   HCT 28.5*        CMP:   Recent Labs   Lab 05/16/23 2010   *   K 4.0      CO2 26   *   BUN 14   CREATININE 0.8   CALCIUM 8.9   PROT 6.6   ALBUMIN 3.6   BILITOT 0.5   ALKPHOS 73   AST 18   ALT 14   ANIONGAP 6*     Troponin: No results for input(s): TROPONINI, TROPONINIHS in the last 48 hours.  Urine Studies: No results for input(s): COLORU, APPEARANCEUA, PHUR, SPECGRAV, PROTEINUA, GLUCUA, KETONESU, BILIRUBINUA, OCCULTUA, NITRITE, UROBILINOGEN, LEUKOCYTESUR, RBCUA, WBCUA, BACTERIA, SQUAMEPITHEL, HYALINECASTS in the last 48 hours.    Invalid input(s): WRIGHTSUR    Significant Imaging: I have reviewed all pertinent imaging results/findings within the past 24 hours.  Imaging Results              CT Pelvis Without Contrast (Final result)  Result time 05/16/23 16:47:58      Final result by Mekhi Kwon MD (05/16/23 16:47:58)                   Narrative:    CMS MANDATED QUALITY DATA-CT RADIATION DOSE-436  All CT scans at this facility use dose modulation,  iterative reconstruction, and or weight-based dosing when appropriate to reduce radiation dose to as low as reasonably achievable. Unless otherwise stated, incidental findings do not require dedicated follow-up imaging.    HISTORY: Pelvic trauma    FINDINGS: Axial noncontrast imaging was performed, with sagittal and coronal reformatted images reviewed. There is an acute oblique nondisplaced fracture through the anterior column of the left acetabulum, which involves the acetabular articular surface. There is also an acute comminuted nondisplaced fracture of the left inferior pubic ramus. There are no other acute fractures.    There is a remote healed intertrochanteric left femoral fracture, post ORIF with antegrade intramedullary nail and bladed femoral neck screw, with no findings of hardware fracture or loosening. There is a remote healed fracture of the left and right pubis, with degenerative narrowing of the hip joint spaces and sacroiliac joints. The bones are diffusely osteopenic.    The unenhanced musculature is unremarkable. There is no evidence of acute intrapelvic or pelvic retroperitoneal hemorrhage, with no soft tissue hematoma. The uterus is surgically absent. There are a few calcified pelvic phleboliths, with the unenhanced rectum and urinary bladder are unremarkable. Stranding reflecting edema within the visualized lower abdominal mesentery is nonspecific.    IMPRESSION:  1. Acute nondisplaced fracture of the anterior column left acetabulum, with acute comminuted left inferior pubic ramus fracture.  2. Additional observations as described.    Electronically signed by:  Mekhi Kwon MD  5/16/2023 4:47 PM CDT Workstation: [id number]                                     CT Head Without Contrast (Final result)  Result time 05/16/23 16:36:34      Final result by Sabas Anand MD (05/16/23 16:36:34)                   Narrative:    EXAM DESCRIPTION: CT HEAD WITHOUT CONTRAST    CLINICAL HISTORY: 77 years  Female, Head trauma, minor (Age >= 65y)    COMPARISON: November 30, 2022.    TECHNIQUE: Images of the brain were obtained without the administration of intravenous contrast. All CT scans at this facility utilized dose modulation, iterative reconstruction, and/or weightbase dosing when appropriate to reduce the radiation dose to his low as reasonably achievable. 57.50 mGy.    FINDINGS: Some age related volume loss is again noted. No evidence of a mass lesion or hemorrhage can be seen. Decreased attenuation is seen in the white matter of the brain. The calvarium appears to be intact. The visualized portions of the sinuses and mastoids are unremarkable.    IMPRESSION:  No evidence of any intracranial hemorrhage can be seen.    Electronically signed by:  Sabas Anand MD  5/16/2023 4:36 PM CDT Workstation: [id number]                                     X-Ray Hip 2 or 3 views Left (with Pelvis when performed) (Final result)  Result time 05/16/23 13:47:44      Final result by Bryan Stevens MD (05/16/23 13:47:44)                   Narrative:    Reason: pain Fall this morning, left hip pain    FINDINGS:  AP pelvis and 2 views of left hip are limited by patient habitus and the lateral view, resulting in limited visualization of the left hemipelvis and proximal femur. No acute fracture, dislocation or destructive osseous lesion identified. Short stem intramedullary nail transfixed with proximal helical blade and distal interlocking screw shows no loosening or other complication. Bilateral hip joint spaces are maintained. Pubic symphysis and sacroiliac joints are maintained. Chronic osseous remodeling of right pubic body unchanged from 11/30/2022 CT, suggesting old healed fracture. Convex left lumbar spine curvature partially visualized with multilevel disc degeneration noted. Soft tissues are unremarkable.    IMPRESSION:  No acute left hip abnormality identified, with evaluation limited.    Electronically signed by:  Bryan Stevens  MD  5/16/2023 1:47 PM CDT Workstation: [id number]                                      Assessment/Plan:     * Closed fracture of acetabulum  Orthopedic surgeon consulted  Pain control  PT/OT  Nonweightbearing pending ortho evaluation  Monitor hemoglobin        Essential hypertension  Continue home antihypertensive medications      ACP (advance care planning)  Patient with LAPOST stating full code , confirmed with patient  that she wishes to be full code        VTE Risk Mitigation (From admission, onward)         Ordered     Place CARI hose  Until discontinued         05/16/23 2104                   On 05/16/2023, patient should be placed in hospital observation services under my care.        Carrol Almendarez MD  Department of Hospital Medicine  [facility] Emergency Dept

## 2023-05-17 NOTE — PHARMACY MED REC
"    Admission Medication History     The home medication history was taken by Linda Lewis.    You may go to "Admission" then "Reconcile Home Medications" tabs to review and/or act upon these items.     The home medication list has been updated by the Pharmacy department.   Please read ALL comments highlighted in yellow.   Please address this information as you see fit.    Feel free to contact us if you have any questions or require assistance.      The medications listed below were removed from the home medication list. Please reorder if appropriate:  Patient reports no longer taking the following medication(s):  Protonix  Augmentin  kenalog        Medications listed below were obtained from: Patient/family  No current facility-administered medications on file prior to encounter.     Current Outpatient Medications on File Prior to Encounter   Medication Sig Dispense Refill    amlodipine-benazepril 5-20 mg (LOTREL) 5-20 mg per capsule TAKE 1 CAPSULE BY MOUTH EVERY DAY (Patient taking differently: Take 1 capsule by mouth once daily.) 90 capsule 3    ascorbic acid, vitamin C, (VITAMIN C) 250 MG tablet Take 1 tablet (250 mg total) by mouth once daily. 30 tablet 0    EScitalopram oxalate (LEXAPRO) 20 MG tablet Take 1 tablet (20 mg total) by mouth once daily. 90 tablet 1    oxyCODONE-acetaminophen (PERCOCET)  mg per tablet Take one tablet by mouth every 4 to 6 hours as needed for pain (Patient taking differently: Take 1 tablet by mouth every 4 to 6 hours as needed. Take one tablet by mouth every 4 to 6 hours as needed for pain) 140 tablet 0    potassium chloride (KLOR-CON) 10 MEQ TbSR TAKE 1 TABLET(10 MEQ) BY MOUTH EVERY DAY (Patient taking differently: Take 10 mEq by mouth once daily.) 90 tablet 1    pregabalin (LYRICA) 200 MG Cap TAKE 1 CAPSULE(200 MG) BY MOUTH THREE TIMES DAILY (Patient taking differently: Take 200 mg by mouth 3 (three) times daily. TAKE 1 CAPSULE(200 MG) BY MOUTH THREE TIMES DAILY) 90 capsule " 2    acetaminophen (TYLENOL) 325 MG tablet Take 650 mg by mouth every 8 (eight) hours as needed.      alendronate (FOSAMAX) 70 MG tablet Take 1 tablet (70 mg total) by mouth every 7 days. 13 tablet 3    amiodarone (PACERONE) 200 MG Tab Take 1 tablet (200 mg total) by mouth once daily. (Patient not taking: Reported on 5/16/2023) 30 tablet 1    apixaban (ELIQUIS) 2.5 mg Tab Take 1 tablet (2.5 mg total) by mouth 2 (two) times daily. (Patient not taking: Reported on 5/16/2023) 60 tablet 1    cyanocobalamin (VITAMIN B-12) 100 MCG tablet Take 1 tablet (100 mcg total) by mouth once daily. 30 tablet 0    cyclobenzaprine (FLEXERIL) 10 MG tablet TAKE 1 TABLET(10 MG) BY MOUTH THREE TIMES DAILY AS NEEDED FOR MUSCLE SPASMS (Patient taking differently: Take 10 mg by mouth 3 (three) times daily as needed.) 90 tablet 2    docusate sodium (COLACE) 100 MG capsule Take 1 capsule (100 mg total) by mouth 2 (two) times daily. 60 capsule 1    ferrous sulfate 325 (65 FE) MG EC tablet Take 1 tablet (325 mg total) by mouth once daily. 90 tablet 1    furosemide (LASIX) 40 MG tablet TAKE 1 TABLET(40 MG) BY MOUTH DAILY AS NEEDED FOR SWELLING (Patient taking differently: Take 40 mg by mouth daily as needed.) 90 tablet 1    metoprolol tartrate (LOPRESSOR) 50 MG tablet Take 1 tablet (50 mg total) by mouth 3 (three) times daily. (Patient not taking: Reported on 5/16/2023) 90 tablet 1    omeprazole (PRILOSEC) 40 MG capsule Take 1 capsule (40 mg total) by mouth every morning. 90 capsule 2    polyethylene glycol (GLYCOLAX) 17 gram PwPk Take 17 g by mouth 2 (two) times daily. 60 packet 1    [DISCONTINUED] amoxicillin-clavulanate 875-125mg (AUGMENTIN) 875-125 mg per tablet Take 1 tablet by mouth 2 (two) times daily. 8 tablet 0    [DISCONTINUED] pantoprazole (PROTONIX) 40 MG tablet Take 1 tablet (40 mg total) by mouth 2 (two) times daily. 60 tablet 4    [DISCONTINUED] triamcinolone acetonide 0.1% (KENALOG) 0.1 % ointment Apply topically 2 (two) times  daily. 30 g 0       Potential issues to be addressed PRIOR TO DISCHARGE  Patient reported not taking the following medications: (amiodarone,eliquis,[id number]). These medications remain on the home medication list. Please address accordingly.   Please discuss with the patient barriers to adherence with medication treatment plans    Linda Lewis  [id number]             .

## 2023-05-17 NOTE — PLAN OF CARE
Problem: Occupational Therapy  Goal: Occupational Therapy Goal  Description: Goals to be met by: 6/14/2023     Patient will increase functional independence with ADLs by performing:    LE Dressing with Supervision and Assistive Devices as needed.  Grooming while standing at sink with Supervision.  Toileting from toilet with Supervision for hygiene and clothing management.   Supine to sit with Supervision.  Toilet transfer to toilet with Supervision.    Outcome: Ongoing, Progressing

## 2023-05-17 NOTE — DISCHARGE INSTRUCTIONS
Diet:  Resume regular diet    Physical Activity:  Activity as tolerated    Instructions:  1. Take all medications as prescribed  2. Keep all follow-up appointments  3. Return to the hospital or call your primary care physicians if any worsening symptoms such as fall, dizziness, weakness occur.    Follow-Up Appointments:  Please call your primary care physician to schedule an appointment in 1 week time.

## 2023-05-17 NOTE — SUBJECTIVE & OBJECTIVE
Past Medical History:   Diagnosis Date    Anemia     Arthritis     Bleeding ulcer 07/2016    Chronic pain     DDD (degenerative disc disease), cervical     DDD (degenerative disc disease), lumbar     Depression     Disc degeneration, lumbosacral     Diverticulitis     Encounter for blood transfusion     Hypertension     IBS (irritable bowel syndrome)     Neuropathy of both feet     Seizures     none  since 2017    Umbilical hernia 2020       Past Surgical History:   Procedure Laterality Date    BACK SURGERY      BREAST BIOPSY      BREAST SURGERY Right     lumpectomy    CAUDAL EPIDURAL STEROID INJECTION N/A 01/28/2022    Procedure: Injection-steroid-epidural-caudal;  Surgeon: Luzmaria Marcelino MD;  Location: [facility];  Service: Pain Management;  Laterality: N/A;    COLONOSCOPY N/A 01/14/2022    Procedure: COLONOSCOPY;  Surgeon: Abby Landers MD;  Location: [facility];  Service: Endoscopy;  Laterality: N/A;    ENDOSCOPIC ULTRASOUND OF UPPER GASTROINTESTINAL TRACT N/A 07/21/2020    Procedure: ULTRASOUND, UPPER GI TRACT, ENDOSCOPIC;  Surgeon: Marcio Nguyễn III, MD;  Location: [facility];  Service: Endoscopy;  Laterality: N/A;    ESOPHAGOGASTRODUODENOSCOPY N/A 01/14/2022    Procedure: EGD (ESOPHAGOGASTRODUODENOSCOPY);  Surgeon: Abby Landers MD;  Location: [facility];  Service: Endoscopy;  Laterality: N/A;    ESOPHAGOGASTRODUODENOSCOPY N/A 06/10/2022    Procedure: EGD (ESOPHAGOGASTRODUODENOSCOPY);  Surgeon: Abby Landers MD;  Location: [facility];  Service: Endoscopy;  Laterality: N/A;    EYE SURGERY      cataract    HYSTERECTOMY      INTRAMEDULLARY RODDING OF TROCHANTER OF FEMUR Left 12/11/2018    Procedure: INSERTION, INTRAMEDULLARY SANTOS, FEMUR, TROCHANTER;  Surgeon: Eulalio De La Cruz MD;  Location: [facility];  Service: Orthopedics;  Laterality: Left;    SPINAL CORD STIMULATOR IMPLANT  09/18/2013    and removal    SPINE SURGERY  2006    lumbar L2-S1 decompression.    SPINE SURGERY      cervical  decompression    TONSILLECTOMY         Review of patient's allergies indicates:  No Known Allergies    No current facility-administered medications on file prior to encounter.     Current Outpatient Medications on File Prior to Encounter   Medication Sig    amlodipine-benazepril 5-20 mg (LOTREL) 5-20 mg per capsule TAKE 1 CAPSULE BY MOUTH EVERY DAY (Patient taking differently: Take 1 capsule by mouth once daily.)    ascorbic acid, vitamin C, (VITAMIN C) 250 MG tablet Take 1 tablet (250 mg total) by mouth once daily.    EScitalopram oxalate (LEXAPRO) 20 MG tablet Take 1 tablet (20 mg total) by mouth once daily.    oxyCODONE-acetaminophen (PERCOCET)  mg per tablet Take one tablet by mouth every 4 to 6 hours as needed for pain (Patient taking differently: Take 1 tablet by mouth every 4 to 6 hours as needed. Take one tablet by mouth every 4 to 6 hours as needed for pain)    potassium chloride (KLOR-CON) 10 MEQ TbSR TAKE 1 TABLET(10 MEQ) BY MOUTH EVERY DAY (Patient taking differently: Take 10 mEq by mouth once daily.)    pregabalin (LYRICA) 200 MG Cap TAKE 1 CAPSULE(200 MG) BY MOUTH THREE TIMES DAILY (Patient taking differently: Take 200 mg by mouth 3 (three) times daily. TAKE 1 CAPSULE(200 MG) BY MOUTH THREE TIMES DAILY)    acetaminophen (TYLENOL) 325 MG tablet Take 650 mg by mouth every 8 (eight) hours as needed.    alendronate (FOSAMAX) 70 MG tablet Take 1 tablet (70 mg total) by mouth every 7 days.    amiodarone (PACERONE) 200 MG Tab Take 1 tablet (200 mg total) by mouth once daily. (Patient not taking: Reported on 5/16/2023)    apixaban (ELIQUIS) 2.5 mg Tab Take 1 tablet (2.5 mg total) by mouth 2 (two) times daily. (Patient not taking: Reported on 5/16/2023)    cyanocobalamin (VITAMIN B-12) 100 MCG tablet Take 1 tablet (100 mcg total) by mouth once daily.    cyclobenzaprine (FLEXERIL) 10 MG tablet TAKE 1 TABLET(10 MG) BY MOUTH THREE TIMES DAILY AS NEEDED FOR MUSCLE SPASMS (Patient taking differently: Take 10  mg by mouth 3 (three) times daily as needed.)    docusate sodium (COLACE) 100 MG capsule Take 1 capsule (100 mg total) by mouth 2 (two) times daily.    ferrous sulfate 325 (65 FE) MG EC tablet Take 1 tablet (325 mg total) by mouth once daily.    furosemide (LASIX) 40 MG tablet TAKE 1 TABLET(40 MG) BY MOUTH DAILY AS NEEDED FOR SWELLING (Patient taking differently: Take 40 mg by mouth daily as needed.)    metoprolol tartrate (LOPRESSOR) 50 MG tablet Take 1 tablet (50 mg total) by mouth 3 (three) times daily. (Patient not taking: Reported on 5/16/2023)    omeprazole (PRILOSEC) 40 MG capsule Take 1 capsule (40 mg total) by mouth every morning.    polyethylene glycol (GLYCOLAX) 17 gram PwPk Take 17 g by mouth 2 (two) times daily.    [DISCONTINUED] amoxicillin-clavulanate 875-125mg (AUGMENTIN) 875-125 mg per tablet Take 1 tablet by mouth 2 (two) times daily.    [DISCONTINUED] pantoprazole (PROTONIX) 40 MG tablet Take 1 tablet (40 mg total) by mouth 2 (two) times daily.    [DISCONTINUED] triamcinolone acetonide 0.1% (KENALOG) 0.1 % ointment Apply topically 2 (two) times daily.     Family History       Problem Relation (Age of Onset)    COPD Brother    Coronary artery disease Father    Depression Father    Diabetes Mother, Father, Sister, Brother    Heart disease Father    Hypertension Mother, Father    Irritable bowel syndrome Mother          Tobacco Use    Smoking status: Never    Smokeless tobacco: Never   Substance and Sexual Activity    Alcohol use: No    Drug use: No    Sexual activity: Not Currently     Review of Systems   Constitutional:  Negative for fatigue and fever.   Respiratory:  Negative for shortness of breath.    Cardiovascular:  Negative for palpitations.   Gastrointestinal:  Negative for abdominal pain, nausea and vomiting.   Musculoskeletal:  Positive for arthralgias and gait problem.   Neurological:  Positive for weakness.   All other systems reviewed and are negative.  Objective:     Vital Signs (Most  Recent):  Temp: 98.5 °F (36.9 °C) (05/16/23 1303)  Pulse: 92 (05/16/23 2037)  Resp: 20 (05/16/23 2037)  BP: 139/68 (05/16/23 1800)  SpO2: 95 % (05/16/23 2037) Vital Signs (24h Range):  Temp:  [98.5 °F (36.9 °C)] 98.5 °F (36.9 °C)  Pulse:  [79-92] 92  Resp:  [18-22] 20  SpO2:  [93 %-99 %] 95 %  BP: (110-156)/(62-73) 139/68     Weight: 68 kg (150 lb)  Body mass index is 24.21 kg/m².     Physical Exam  Vitals and nursing note reviewed.   HENT:      Head: Normocephalic.      Mouth/Throat:      Mouth: Mucous membranes are moist.   Eyes:      Pupils: Pupils are equal, round, and reactive to light.   Cardiovascular:      Rate and Rhythm: Normal rate.   Pulmonary:      Effort: Pulmonary effort is normal.   Abdominal:      Palpations: Abdomen is soft.   Musculoskeletal:         General: Tenderness present.      Cervical back: Normal range of motion.   Neurological:      Mental Status: She is alert. Mental status is at baseline.   Psychiatric:         Mood and Affect: Mood normal.            CRANIAL NERVES     CN III, IV, VI   Pupils are equal, round, and reactive to light.     Significant Labs: All pertinent labs within the past 24 hours have been reviewed.  CBC:   Recent Labs   Lab 05/16/23 2010   WBC 6.75   HGB 8.5*   HCT 28.5*        CMP:   Recent Labs   Lab 05/16/23 2010   *   K 4.0      CO2 26   *   BUN 14   CREATININE 0.8   CALCIUM 8.9   PROT 6.6   ALBUMIN 3.6   BILITOT 0.5   ALKPHOS 73   AST 18   ALT 14   ANIONGAP 6*     Troponin: No results for input(s): TROPONINI, TROPONINIHS in the last 48 hours.  Urine Studies: No results for input(s): COLORU, APPEARANCEUA, PHUR, SPECGRAV, PROTEINUA, GLUCUA, KETONESU, BILIRUBINUA, OCCULTUA, NITRITE, UROBILINOGEN, LEUKOCYTESUR, RBCUA, WBCUA, BACTERIA, SQUAMEPITHEL, HYALINECASTS in the last 48 hours.    Invalid input(s): WRIGHTSUR    Significant Imaging: I have reviewed all pertinent imaging results/findings within the past 24 hours.  Imaging Results               CT Pelvis Without Contrast (Final result)  Result time 05/16/23 16:47:58      Final result by Mekhi Kwon MD (05/16/23 16:47:58)                   Narrative:    CMS MANDATED QUALITY DATA-CT RADIATION DOSE-436  All CT scans at this facility use dose modulation, iterative reconstruction, and or weight-based dosing when appropriate to reduce radiation dose to as low as reasonably achievable. Unless otherwise stated, incidental findings do not require dedicated follow-up imaging.    HISTORY: Pelvic trauma    FINDINGS: Axial noncontrast imaging was performed, with sagittal and coronal reformatted images reviewed. There is an acute oblique nondisplaced fracture through the anterior column of the left acetabulum, which involves the acetabular articular surface. There is also an acute comminuted nondisplaced fracture of the left inferior pubic ramus. There are no other acute fractures.    There is a remote healed intertrochanteric left femoral fracture, post ORIF with antegrade intramedullary nail and bladed femoral neck screw, with no findings of hardware fracture or loosening. There is a remote healed fracture of the left and right pubis, with degenerative narrowing of the hip joint spaces and sacroiliac joints. The bones are diffusely osteopenic.    The unenhanced musculature is unremarkable. There is no evidence of acute intrapelvic or pelvic retroperitoneal hemorrhage, with no soft tissue hematoma. The uterus is surgically absent. There are a few calcified pelvic phleboliths, with the unenhanced rectum and urinary bladder are unremarkable. Stranding reflecting edema within the visualized lower abdominal mesentery is nonspecific.    IMPRESSION:  1. Acute nondisplaced fracture of the anterior column left acetabulum, with acute comminuted left inferior pubic ramus fracture.  2. Additional observations as described.    Electronically signed by:  Mekhi Kwon MD  5/16/2023 4:47 PM CDT Workstation: [id number]                                      CT Head Without Contrast (Final result)  Result time 05/16/23 16:36:34      Final result by Sabas Anand MD (05/16/23 16:36:34)                   Narrative:    EXAM DESCRIPTION: CT HEAD WITHOUT CONTRAST    CLINICAL HISTORY: 77 years Female, Head trauma, minor (Age >= 65y)    COMPARISON: November 30, 2022.    TECHNIQUE: Images of the brain were obtained without the administration of intravenous contrast. All CT scans at this facility utilized dose modulation, iterative reconstruction, and/or weightbase dosing when appropriate to reduce the radiation dose to his low as reasonably achievable. 57.50 mGy.    FINDINGS: Some age related volume loss is again noted. No evidence of a mass lesion or hemorrhage can be seen. Decreased attenuation is seen in the white matter of the brain. The calvarium appears to be intact. The visualized portions of the sinuses and mastoids are unremarkable.    IMPRESSION:  No evidence of any intracranial hemorrhage can be seen.    Electronically signed by:  Sabas Anand MD  5/16/2023 4:36 PM CDT Workstation: [id number]                                     X-Ray Hip 2 or 3 views Left (with Pelvis when performed) (Final result)  Result time 05/16/23 13:47:44      Final result by Bryan Stevens MD (05/16/23 13:47:44)                   Narrative:    Reason: pain Fall this morning, left hip pain    FINDINGS:  AP pelvis and 2 views of left hip are limited by patient habitus and the lateral view, resulting in limited visualization of the left hemipelvis and proximal femur. No acute fracture, dislocation or destructive osseous lesion identified. Short stem intramedullary nail transfixed with proximal helical blade and distal interlocking screw shows no loosening or other complication. Bilateral hip joint spaces are maintained. Pubic symphysis and sacroiliac joints are maintained. Chronic osseous remodeling of right pubic body unchanged from 11/30/2022 CT, suggesting  old healed fracture. Convex left lumbar spine curvature partially visualized with multilevel disc degeneration noted. Soft tissues are unremarkable.    IMPRESSION:  No acute left hip abnormality identified, with evaluation limited.    Electronically signed by:  Bryan Stevens MD  5/16/2023 1:47 PM CDT Workstation: [id number]

## 2023-05-17 NOTE — NURSING
Nurses Note -- 4 Eyes      5/17/2023   12:31 AM      Skin assessed during: Admit      [] No Altered Skin Integrity Present    []Prevention Measures Documented      [x] Yes- Altered Skin Integrity Present or Discovered   [] LDA Added if Not in Epic (Describe Wound)   [x] New Altered Skin Integrity was Present on Admit and Documented in LDA   [] Wound Image Taken    Wound Care Consulted? No    Attending Nurse:  Casandra Rodriguez RN     Second RN/Staff Member:  oskar

## 2023-05-17 NOTE — PLAN OF CARE
DC orders and chart reviewed. No discharge needs noted.  Patient cleared for discharge from .  Patient is discharging home with [facility] per patient's choice.  Start of care date is tomorrow, 5/18.  Follow up appointment information added to AVS.        05/17/23 1227   Final Note   Assessment Type Final Discharge Note   Anticipated Discharge Disposition Home-Health   What phone number can be called within the next 1-3 days to see how you are doing after discharge? [phone]   Hospital Resources/Appts/Education Provided Appointments scheduled and added to AVS;Provided patient/caregiver with written discharge plan information   Post-Acute Status   Post-Acute Authorization Home Health   Home Health Status Set-up Complete/Auth obtained   Patient choice form signed by patient/caregiver List with quality metrics by geographic area provided   Discharge Delays None known at this time

## 2023-05-17 NOTE — PT/OT/SLP EVAL
Occupational Therapy   Evaluation    Name: Froilan Ray  MRN: [id number]  Admitting Diagnosis: Closed fracture of acetabulum  Recent Surgery: * No surgery found *      Recommendations:     Discharge Recommendations: home health OT, home  Discharge Equipment Recommendations:  none  Barriers to discharge:  None    Assessment:     Froilan Ray is a 77 y.o. female with a medical diagnosis of Closed fracture of acetabulum.  She presents with c/o L hip with mobility, but motivated to participate. Noted patient is slightly [place]. Patient noted to have flexed trunk at baseline with standing, but able to extend trunk at request. Patient is unable to maintain extension in trunk with prolonged ambulation. Patient able to sit EOB with SBA. Noted minimal unsteadiness when performing toileting on BSC. Patient would benefit from HHOT and assistance at home to return to OF. Performance deficits affecting function: weakness, impaired endurance, impaired self care skills, impaired functional mobility, gait instability, impaired balance, decreased lower extremity function, pain, decreased ROM, orthopedic precautions.      Rehab Prognosis: Good; patient would benefit from acute skilled OT services to address these deficits and reach maximum level of function.       Plan:     Patient to be seen 5 x/week to address the above listed problems via self-care/home management, therapeutic activities, therapeutic exercises  Plan of Care Expires: 06/14/23  Plan of Care Reviewed with: patient    Subjective     Chief Complaint: L hip pain  Patient/Family Comments/goals: none    Occupational Profile:  Living Environment: Patient lives with spouse and son in a trailer with 4 ALBERT and bilateral handrails.   Previous level of function: Patient was independent with ADLs and ambulatory using RW.   Equipment Used at Home: walker, rolling, cane, straight, bedside commode  Assistance upon Discharge: Patient will receive assistance from family.      Pain/Comfort:  Pain Rating 1: 5/10  Location - Side 1: Left  Location - Orientation 1: generalized  Location 1: hip  Pain Addressed 1: Reposition, Distraction  Pain Rating Post-Intervention 1: 5/10    Patients cultural, spiritual, [employer]ec[employer]alism conflicts given the current situation:      Objective:     Communicated with: nurse Hunter prior to session.  Patient found HOB elevated with PureWick, telemetry upon OT entry to room.    General Precautions: Standard, fall  Orthopedic Precautions: LLE partial weight bearing  Braces: N/A  Respiratory Status: Room air    Occupational Performance:    Bed Mobility:    Patient completed Scooting/Bridging with stand by assistance  Patient completed Supine to Sit with stand by assistance    Functional Mobility/Transfers:  Patient completed Sit <> Stand Transfer with minimum assistance  with  rolling walker   Patient completed Bed <> Chair Transfer using Stand Pivot technique with minimum assistance with rolling walker  Functional Mobility: Patient ambulated in room from bed<>sink requiring Min A for balance. No significant LOB with mobility.     Activities of Daily Living:  Grooming: contact guard assistance with hand hygiene while standing at sink  Lower Body Dressing: moderate assistance to don/doff socks while seated EOB  Toileting: minimum assistance with standing balance when performing es-hygiene after voiding on [facility]    Cognitive/Visual Perceptual:  Cognitive/Psychosocial Skills:     -       Oriented to: x4   -       Follows Commands/attention:Follows multistep  commands  -       Communication: clear/fluent; [place]  -       Safety awareness/insight to disability: intact   -       Mood/Affect/Coping skills/emotional control: Appropriate to situation, Cooperative, and Pleasant  Visual/Perceptual:      -Intact     Physical Exam:  Postural examination/scapula alignment:    -       Rounded shoulders  -       Forward head  Upper Extremity Range of Motion:     -       Right Upper  Extremity: WFL  -       Left Upper Extremity: WFL  Upper Extremity Strength:    -       Right Upper Extremity: WFL  -       Left Upper Extremity: WFL   Strength:    -       Right Upper Extremity: WFL  -       Left Upper Extremity: WFL  Fine Motor Coordination:    -       Intact  Gross motor coordination:   WFL in BUE    AMPAC 6 Click ADL:  AMPA Total Score: 18    Treatment & Education:  OT ed pt on OT role & POC as well as discharge recommendations.  OT ed patient on safety with walker use for functional mobility with cues for hand placement & sequencing.   Patient educated importance of maintaining LLE PWB with mobility.       Patient left sitting edge of bed with all lines intact, call button in reach, and PT present    GOALS:   Multidisciplinary Problems       Occupational Therapy Goals          Problem: Occupational Therapy    Goal Priority Disciplines Outcome Interventions   Occupational Therapy Goal     OT, PT/OT Ongoing, Progressing    Description: Goals to be met by: 6/14/2023     Patient will increase functional independence with ADLs by performing:    LE Dressing with Supervision and Assistive Devices as needed.  Grooming while standing at sink with Supervision.  Toileting from toilet with Supervision for hygiene and clothing management.   Supine to sit with Supervision.  Toilet transfer to toilet with Supervision.                         History:     Past Medical History:   Diagnosis Date    Anemia     Arthritis     Bleeding ulcer 07/2016    Chronic pain     DDD (degenerative disc disease), cervical     DDD (degenerative disc disease), lumbar     Depression     Disc degeneration, lumbosacral     Diverticulitis     Encounter for blood transfusion     Hypertension     IBS (irritable bowel syndrome)     Neuropathy of both feet     Seizures     none  since 2017    Umbilical hernia 2020         Past Surgical History:   Procedure Laterality Date    BACK SURGERY      BREAST BIOPSY      BREAST SURGERY Right      lumpectomy    CAUDAL EPIDURAL STEROID INJECTION N/A 01/28/2022    Procedure: Injection-steroid-epidural-caudal;  Surgeon: Luzmaria Marcelino MD;  Location: [facility] OR;  Service: Pain Management;  Laterality: N/A;    COLONOSCOPY N/A 01/14/2022    Procedure: COLONOSCOPY;  Surgeon: Abby Landers MD;  Location: [facility] ENDO;  Service: Endoscopy;  Laterality: N/A;    ENDOSCOPIC ULTRASOUND OF UPPER GASTROINTESTINAL TRACT N/A 07/21/2020    Procedure: ULTRASOUND, UPPER GI TRACT, ENDOSCOPIC;  Surgeon: Marcio Nguyễn III, MD;  Location: [facility] ENDO;  Service: Endoscopy;  Laterality: N/A;    ESOPHAGOGASTRODUODENOSCOPY N/A 01/14/2022    Procedure: EGD (ESOPHAGOGASTRODUODENOSCOPY);  Surgeon: Abby Landers MD;  Location: [facility];  Service: Endoscopy;  Laterality: N/A;    ESOPHAGOGASTRODUODENOSCOPY N/A 06/10/2022    Procedure: EGD (ESOPHAGOGASTRODUODENOSCOPY);  Surgeon: Abby Landers MD;  Location: [facility];  Service: Endoscopy;  Laterality: N/A;    EYE SURGERY      cataract    HYSTERECTOMY      INTRAMEDULLARY RODDING OF TROCHANTER OF FEMUR Left 12/11/2018    Procedure: INSERTION, INTRAMEDULLARY SANTOS, FEMUR, TROCHANTER;  Surgeon: Eulalio De La Cruz MD;  Location: [facility];  Service: Orthopedics;  Laterality: Left;    SPINAL CORD STIMULATOR IMPLANT  09/18/2013    and removal    SPINE SURGERY  2006    lumbar L2-S1 decompression.    SPINE SURGERY      cervical decompression    TONSILLECTOMY         Time Tracking:     OT Date of Treatment: 05/17/23  OT Start Time: 0910  OT Stop Time: 0931  OT Total Time (min): 21 min    Billable Minutes:Evaluation 10  Self Care/Home Management 11    5/17/2023

## 2023-05-17 NOTE — ASSESSMENT & PLAN NOTE
Orthopedic surgeon consulted  Pain control  PT/OT  Nonweightbearing pending ortho evaluation  Monitor hemoglobin

## 2023-05-17 NOTE — PLAN OF CARE
Problem: Adult Inpatient Plan of Care  Goal: Plan of Care Review  Outcome: Met  Goal: Patient-Specific Goal (Individualized)  Outcome: Met  Goal: Absence of Hospital-Acquired Illness or Injury  Outcome: Met  Goal: Optimal Comfort and Wellbeing  Outcome: Met  Goal: Readiness for Transition of Care  Outcome: Met     Problem: Adjustment to Illness (Sepsis/Septic Shock)  Goal: Optimal Coping  Outcome: Met     Problem: Bleeding (Sepsis/Septic Shock)  Goal: Absence of Bleeding  Outcome: Met     Problem: Glycemic Control Impaired (Sepsis/Septic Shock)  Goal: Blood Glucose Level Within Desired Range  Outcome: Met     Problem: Infection Progression (Sepsis/Septic Shock)  Goal: Absence of Infection Signs and Symptoms  Outcome: Met     Problem: Nutrition Impaired (Sepsis/Septic Shock)  Goal: Optimal Nutrition Intake  Outcome: Met     Problem: Fluid and Electrolyte Imbalance (Acute Kidney Injury/Impairment)  Goal: Fluid and Electrolyte Balance  Outcome: Met     Problem: Oral Intake Inadequate (Acute Kidney Injury/Impairment)  Goal: Optimal Nutrition Intake  Outcome: Met     Problem: Renal Function Impairment (Acute Kidney Injury/Impairment)  Goal: Effective Renal Function  Outcome: Met     Problem: Skin Injury Risk Increased  Goal: Skin Health and Integrity  Outcome: Met     Problem: Impaired Wound Healing  Goal: Optimal Wound Healing  Outcome: Met

## 2023-05-18 NOTE — TELEPHONE ENCOUNTER
----- Message from Shayna Boss RN sent at 5/17/2023 12:07 PM CDT -----  Patient is being discharged from [facility] today and will need a follow up appointment in about a week.  Please contact patient with appointment information.     Thank you  Shayna Boss

## 2023-05-19 ENCOUNTER — TELEPHONE (OUTPATIENT)
Dept: FAMILY MEDICINE | Facility: CLINIC | Age: 78
End: 2023-05-19
Payer: MEDICARE

## 2023-05-19 NOTE — TELEPHONE ENCOUNTER
Called pt alonzo gutierrez on vm we were trying to reach her about sched a hosp fu to please call us back.

## 2023-05-25 DIAGNOSIS — S72.009A CLOSED FRACTURE OF HIP, UNSPECIFIED LATERALITY, INITIAL ENCOUNTER: Primary | ICD-10-CM

## 2023-06-04 ENCOUNTER — EXTERNAL HOME HEALTH (OUTPATIENT)
Dept: HOME HEALTH SERVICES | Facility: HOSPITAL | Age: 78
End: 2023-06-04
Payer: MEDICARE

## 2023-06-13 RX ORDER — OMEPRAZOLE 40 MG/1
CAPSULE, DELAYED RELEASE ORAL
Qty: 90 CAPSULE | Refills: 2 | Status: ON HOLD | OUTPATIENT
Start: 2023-06-13 | End: 2024-01-27

## 2023-06-21 ENCOUNTER — DOCUMENT SCAN (OUTPATIENT)
Dept: HOME HEALTH SERVICES | Facility: HOSPITAL | Age: 78
End: 2023-06-21
Payer: MEDICARE

## 2023-07-17 PROCEDURE — G0179 PR HOME HEALTH MD RECERTIFICATION: ICD-10-PCS | Mod: ,,, | Performed by: ORTHOPAEDIC SURGERY

## 2023-07-17 PROCEDURE — G0179 MD RECERTIFICATION HHA PT: HCPCS | Mod: ,,, | Performed by: ORTHOPAEDIC SURGERY

## 2023-08-01 ENCOUNTER — EXTERNAL HOME HEALTH (OUTPATIENT)
Dept: HOME HEALTH SERVICES | Facility: HOSPITAL | Age: 78
End: 2023-08-01
Payer: MEDICARE

## 2023-09-18 ENCOUNTER — HOSPITAL ENCOUNTER (EMERGENCY)
Facility: HOSPITAL | Age: 78
Discharge: HOME | End: 2023-09-18
Attending: EMERGENCY MEDICINE
Payer: MEDICARE

## 2023-09-18 VITALS
RESPIRATION RATE: 20 BRPM | OXYGEN SATURATION: 98 % | SYSTOLIC BLOOD PRESSURE: 180 MMHG | BODY MASS INDEX: 24.11 KG/M2 | HEART RATE: 86 BPM | TEMPERATURE: 98 F | WEIGHT: 150 LBS | DIASTOLIC BLOOD PRESSURE: 87 MMHG | HEIGHT: 66 IN

## 2023-09-18 DIAGNOSIS — R53.1 WEAKNESS: ICD-10-CM

## 2023-09-18 DIAGNOSIS — R11.0 NAUSEA: Primary | ICD-10-CM

## 2023-09-18 LAB
ALBUMIN SERPL BCP-MCNC: 3.7 G/DL (ref 3.5–5.2)
ALP SERPL-CCNC: 74 U/L (ref 55–135)
ALT SERPL W/O P-5'-P-CCNC: 11 U/L (ref 10–44)
ANION GAP SERPL CALC-SCNC: 7 MMOL/L (ref 8–16)
AST SERPL-CCNC: 15 U/L (ref 10–40)
BASOPHILS # BLD AUTO: 0.03 K/UL (ref 0–0.2)
BASOPHILS NFR BLD: 0.6 % (ref 0–1.9)
BILIRUB SERPL-MCNC: 0.6 MG/DL (ref 0.1–1)
BILIRUB UR QL STRIP: NEGATIVE
BNP SERPL-MCNC: 149 PG/ML (ref 0–99)
BUN SERPL-MCNC: 12 MG/DL (ref 8–23)
CALCIUM SERPL-MCNC: 9 MG/DL (ref 8.7–10.5)
CHLORIDE SERPL-SCNC: 106 MMOL/L (ref 95–110)
CLARITY UR: CLEAR
CO2 SERPL-SCNC: 24 MMOL/L (ref 23–29)
COLOR UR: YELLOW
CREAT SERPL-MCNC: 0.5 MG/DL (ref 0.5–1.4)
DIFFERENTIAL METHOD: ABNORMAL
EOSINOPHIL # BLD AUTO: 0 K/UL (ref 0–0.5)
EOSINOPHIL NFR BLD: 0.2 % (ref 0–8)
ERYTHROCYTE [DISTWIDTH] IN BLOOD BY AUTOMATED COUNT: 15.4 % (ref 11.5–14.5)
EST. GFR  (NO RACE VARIABLE): >60 ML/MIN/1.73 M^2
GLUCOSE SERPL-MCNC: 125 MG/DL (ref 70–110)
GLUCOSE UR QL STRIP: NEGATIVE
HCT VFR BLD AUTO: 30.4 % (ref 37–48.5)
HGB BLD-MCNC: 9.2 G/DL (ref 12–16)
HGB UR QL STRIP: NEGATIVE
IMM GRANULOCYTES # BLD AUTO: 0.02 K/UL (ref 0–0.04)
IMM GRANULOCYTES NFR BLD AUTO: 0.4 % (ref 0–0.5)
INFLUENZA A, MOLECULAR: NEGATIVE
INFLUENZA B, MOLECULAR: NEGATIVE
KETONES UR QL STRIP: ABNORMAL
LACTATE SERPL-SCNC: 0.9 MMOL/L (ref 0.5–1.9)
LEUKOCYTE ESTERASE UR QL STRIP: NEGATIVE
LYMPHOCYTES # BLD AUTO: 0.5 K/UL (ref 1–4.8)
LYMPHOCYTES NFR BLD: 11 % (ref 18–48)
MAGNESIUM SERPL-MCNC: 2.2 MG/DL (ref 1.6–2.6)
MCH RBC QN AUTO: 24.2 PG (ref 27–31)
MCHC RBC AUTO-ENTMCNC: 30.3 G/DL (ref 32–36)
MCV RBC AUTO: 80 FL (ref 82–98)
MONOCYTES # BLD AUTO: 0.2 K/UL (ref 0.3–1)
MONOCYTES NFR BLD: 3.6 % (ref 4–15)
NEUTROPHILS # BLD AUTO: 4 K/UL (ref 1.8–7.7)
NEUTROPHILS NFR BLD: 84.2 % (ref 38–73)
NITRITE UR QL STRIP: NEGATIVE
NRBC BLD-RTO: 0 /100 WBC
PH UR STRIP: >8 [PH] (ref 5–8)
PLATELET # BLD AUTO: 431 K/UL (ref 150–450)
PMV BLD AUTO: 9.2 FL (ref 9.2–12.9)
POTASSIUM SERPL-SCNC: 3.6 MMOL/L (ref 3.5–5.1)
PROT SERPL-MCNC: 6.9 G/DL (ref 6–8.4)
PROT UR QL STRIP: ABNORMAL
RBC # BLD AUTO: 3.8 M/UL (ref 4–5.4)
SARS-COV-2 RDRP RESP QL NAA+PROBE: NEGATIVE
SODIUM SERPL-SCNC: 137 MMOL/L (ref 136–145)
SP GR UR STRIP: 1.01 (ref 1–1.03)
SPECIMEN SOURCE: NORMAL
TROPONIN I SERPL HS-MCNC: 6.1 PG/ML (ref 0–14.9)
URN SPEC COLLECT METH UR: ABNORMAL
UROBILINOGEN UR STRIP-ACNC: NEGATIVE EU/DL
WBC # BLD AUTO: 4.72 K/UL (ref 3.9–12.7)

## 2023-09-18 PROCEDURE — 93010 ELECTROCARDIOGRAM REPORT: CPT | Mod: ,,, | Performed by: SPECIALIST

## 2023-09-18 PROCEDURE — 93005 ELECTROCARDIOGRAM TRACING: CPT | Performed by: SPECIALIST

## 2023-09-18 PROCEDURE — U0002 COVID-19 LAB TEST NON-CDC: HCPCS | Performed by: EMERGENCY MEDICINE

## 2023-09-18 PROCEDURE — 84484 ASSAY OF TROPONIN QUANT: CPT | Performed by: EMERGENCY MEDICINE

## 2023-09-18 PROCEDURE — 85025 COMPLETE CBC W/AUTO DIFF WBC: CPT | Performed by: EMERGENCY MEDICINE

## 2023-09-18 PROCEDURE — 87502 INFLUENZA DNA AMP PROBE: CPT | Performed by: EMERGENCY MEDICINE

## 2023-09-18 PROCEDURE — 83880 ASSAY OF NATRIURETIC PEPTIDE: CPT | Performed by: EMERGENCY MEDICINE

## 2023-09-18 PROCEDURE — 83735 ASSAY OF MAGNESIUM: CPT | Performed by: EMERGENCY MEDICINE

## 2023-09-18 PROCEDURE — 83605 ASSAY OF LACTIC ACID: CPT | Performed by: EMERGENCY MEDICINE

## 2023-09-18 PROCEDURE — 81003 URINALYSIS AUTO W/O SCOPE: CPT | Performed by: EMERGENCY MEDICINE

## 2023-09-18 PROCEDURE — 80053 COMPREHEN METABOLIC PANEL: CPT | Performed by: EMERGENCY MEDICINE

## 2023-09-18 PROCEDURE — 93010 EKG 12-LEAD: ICD-10-PCS | Mod: ,,, | Performed by: SPECIALIST

## 2023-09-18 PROCEDURE — 99284 EMERGENCY DEPT VISIT MOD MDM: CPT | Mod: 25

## 2023-09-18 NOTE — DISCHARGE INSTRUCTIONS
Continue taking Colace as package directs between 1-3 times a day to encourage daily bowel movement

## 2023-09-18 NOTE — ED PROVIDER NOTES
Encounter Date: 9/18/2023       History     Chief Complaint   Patient presents with    Fatigue     Present via EMS, EMS reported pt c/o general illness, chills, sweat since yesterday, dry heaving today, EMS reported blood sugar of 125, bp 188/90, pt has not taken her daily HTN meds yet, NS on monitor, hr in the 80's,      Emergent evaluation of a 77-year-old female with history of hypertension, IBS, GI bleed in past, constipation, diverticulitis, and umbilical hernia, anemia, degenerative disc disease of the cervical and lumbar spine, arthritis, chronic pain presents to the ER by EMS due to feeling weak and fatigued last night overnight had chills and sweats this morning woke up feeling nauseous and came to the ER by EMS glucose was 125 blood pressure 188/90 but patient reports she did not take her blood pressure medication yesterday or today.  She reports no vomiting.  She reports that she would not had a bowel movement in several days so took a Colace yesterday but had not yet had a bowel movement.  She reports she is passing gas and feels gassy.  No chest pain shortness of breath.  No URI symptoms or cough.  No fevers        Review of patient's allergies indicates:  No Known Allergies  Past Medical History:   Diagnosis Date    Anemia     Arthritis     Bleeding ulcer 07/2016    Chronic pain     DDD (degenerative disc disease), cervical     DDD (degenerative disc disease), lumbar     Depression     Disc degeneration, lumbosacral     Diverticulitis     Encounter for blood transfusion     Hypertension     IBS (irritable bowel syndrome)     Neuropathy of both feet     Seizures     none  since 2017    Umbilical hernia 2020     Past Surgical History:   Procedure Laterality Date    BACK SURGERY      BREAST BIOPSY      BREAST SURGERY Right     lumpectomy    CAUDAL EPIDURAL STEROID INJECTION N/A 01/28/2022    Procedure: Injection-steroid-epidural-caudal;  Surgeon: Luzmaria Marcelino MD;  Location: [facility] OR;  Service: Pain  Management;  Laterality: N/A;    COLONOSCOPY N/A 01/14/2022    Procedure: COLONOSCOPY;  Surgeon: Abby Landers MD;  Location: [facility] ENDO;  Service: Endoscopy;  Laterality: N/A;    ENDOSCOPIC ULTRASOUND OF UPPER GASTROINTESTINAL TRACT N/A 07/21/2020    Procedure: ULTRASOUND, UPPER GI TRACT, ENDOSCOPIC;  Surgeon: Marcio Nguyễn III, MD;  Location: [facility] ENDO;  Service: Endoscopy;  Laterality: N/A;    ESOPHAGOGASTRODUODENOSCOPY N/A 01/14/2022    Procedure: EGD (ESOPHAGOGASTRODUODENOSCOPY);  Surgeon: Abby Landers MD;  Location: [facility] ENDO;  Service: Endoscopy;  Laterality: N/A;    ESOPHAGOGASTRODUODENOSCOPY N/A 06/10/2022    Procedure: EGD (ESOPHAGOGASTRODUODENOSCOPY);  Surgeon: Abby Landers MD;  Location: [facility] ENDO;  Service: Endoscopy;  Laterality: N/A;    EYE SURGERY      cataract    HYSTERECTOMY      INTRAMEDULLARY RODDING OF TROCHANTER OF FEMUR Left 12/11/2018    Procedure: INSERTION, INTRAMEDULLARY SANTOS, FEMUR, TROCHANTER;  Surgeon: Eulalio De La Cruz MD;  Location: [facility];  Service: Orthopedics;  Laterality: Left;    SPINAL CORD STIMULATOR IMPLANT  09/18/2013    and removal    SPINE SURGERY  2006    lumbar L2-S1 decompression.    SPINE SURGERY      cervical decompression    TONSILLECTOMY       Family History   Problem Relation Age of Onset    Diabetes Mother     Hypertension Mother     Irritable bowel syndrome Mother     Diabetes Father         insulin dependent    Hypertension Father     Heart disease Father     Coronary artery disease Father     Depression Father     Diabetes Sister     Diabetes Brother     COPD Brother      Social History     Tobacco Use    Smoking status: Never    Smokeless tobacco: Never   Substance Use Topics    Alcohol use: No    Drug use: No     Review of Systems   Constitutional:  Positive for activity change, chills, diaphoresis and fatigue. Negative for appetite change and fever.   HENT:  Negative for congestion, postnasal drip, rhinorrhea and sore throat.     Respiratory:  Negative for cough, chest tightness, shortness of breath, wheezing and stridor.    Cardiovascular:  Negative for chest pain and palpitations.   Gastrointestinal:  Positive for nausea and vomiting. Negative for abdominal distention, abdominal pain, blood in stool, constipation and diarrhea.   Genitourinary:  Positive for frequency. Negative for dysuria, flank pain, hematuria and urgency.   Musculoskeletal:  Positive for myalgias. Negative for arthralgias, back pain, neck pain and neck stiffness.   Skin:  Negative for rash.   Neurological:  Positive for weakness and headaches. Negative for dizziness, syncope and light-headedness.   Hematological:  Does not bruise/bleed easily.   Psychiatric/Behavioral:  Negative for confusion. The patient is not nervous/anxious.    All other systems reviewed and are negative.      Physical Exam     Initial Vitals [09/18/23 0859]   BP Pulse Resp Temp SpO2   (!) 193/90 87 20 97.9 °F (36.6 °C) 100 %      MAP       --         Physical Exam    Nursing note and vitals reviewed.  Constitutional: She appears well-developed and well-nourished. She is not diaphoretic. No distress.   HENT:   Head: Normocephalic and atraumatic.   Right Ear: External ear normal.   Left Ear: External ear normal.   Nose: Nose normal.   Mouth/Throat: Oropharynx is clear and moist.   Moist mucous membranes   Eyes: Conjunctivae and EOM are normal. Pupils are equal, round, and reactive to light.   Neck: Neck supple. No tracheal deviation present.   Normal range of motion.  Cardiovascular:  Normal rate, regular rhythm, normal heart sounds and intact distal pulses.     Exam reveals no gallop and no friction rub.       No murmur heard.  Pressure 193/90 pulse 87   Pulmonary/Chest: Breath sounds normal. No stridor. No respiratory distress. She has no wheezes. She has no rhonchi. She has no rales. She exhibits no tenderness.   Abdominal: Abdomen is soft. Bowel sounds are normal. She exhibits no distension and  no mass. There is no abdominal tenderness.   Umbilical hernia proximally 5 cm in diameter soft and nontender able to be easily reduced no tenderness throughout the rest of the abdomen no rebound or guarding There is no rebound and no guarding.   Musculoskeletal:         General: No edema. Normal range of motion.      Cervical back: Normal range of motion and neck supple.     Neurological: She is alert and oriented to person, place, and time. She has normal strength. No cranial nerve deficit or sensory deficit.   Skin: Skin is warm and dry. No rash noted. No erythema. No pallor.   Psychiatric: She has a normal mood and affect. Her behavior is normal. Judgment and thought content normal.         ED Course   Procedures  Labs Reviewed   CBC W/ AUTO DIFFERENTIAL - Abnormal; Notable for the following components:       Result Value    RBC 3.80 (*)     Hemoglobin 9.2 (*)     Hematocrit 30.4 (*)     MCV 80 (*)     MCH 24.2 (*)     MCHC 30.3 (*)     RDW 15.4 (*)     Lymph # 0.5 (*)     Mono # 0.2 (*)     Gran % 84.2 (*)     Lymph % 11.0 (*)     Mono % 3.6 (*)     All other components within normal limits   COMPREHENSIVE METABOLIC PANEL - Abnormal; Notable for the following components:    Glucose 125 (*)     Anion Gap 7 (*)     All other components within normal limits   URINALYSIS, REFLEX TO URINE CULTURE - Abnormal; Notable for the following components:    pH, UA >8.0 (*)     Protein, UA Trace (*)     Ketones, UA 1+ (*)     All other components within normal limits    Narrative:     Specimen Source->Urine   B-TYPE NATRIURETIC PEPTIDE - Abnormal; Notable for the following components:     (*)     All other components within normal limits   MAGNESIUM   INFLUENZA A AND B ANTIGEN    Narrative:     Specimen Source->Nasopharyngeal Swab   TROPONIN I HIGH SENSITIVITY   SARS-COV-2 RNA AMPLIFICATION, QUAL   LACTIC ACID, PLASMA   LACTIC ACID, PLASMA   LACTIC ACID, PLASMA     EKG Readings: (Independently Interpreted)   Initial  Reading: No STEMI. Rhythm: Normal Sinus Rhythm. Heart Rate: 79. Ectopy: No Ectopy. Conduction: Normal.     ECG Results              EKG 12-lead (In process)  Result time 09/18/23 10:32:44      In process by Interface, Lab In [facility] (09/18/23 10:32:44)                   Narrative:    Test Reason : R53.1,    Vent. Rate : 079 BPM     Atrial Rate : 079 BPM     P-R Int : 160 ms          QRS Dur : 088 ms      QT Int : 398 ms       P-R-T Axes : 065 -06 061 degrees     QTc Int : 456 ms    Normal sinus rhythm  Possible Left atrial enlargement  Borderline Abnormal ECG  When compared with ECG of 26-JAN-2023 10:37,  No significant change was found    Referred By: [email or web address]   SELF           Confirmed By:                                   Imaging Results              X-Ray Chest AP Portable (Final result)  Result time 09/18/23 09:45:40      Final result by Mekhi Kwon MD (09/18/23 09:45:40)                   Narrative:    HISTORY: Fatigue,  weakness, nausea, chills, seizures.    FINDINGS: Portable chest radiograph at 0937 hours compared to prior exams shows the cardiomediastinal silhouette and pulmonary vasculature are within normal limits.    There is unchanged asymmetric elevation of the right hemidiaphragm, with no consolidation, large pleural effusion, or evidence of pulmonary edema. No pneumothorax or acute fractures. There is a remote healed right humeral neck fracture with glenohumeral arthropathic changes. The bones are diffusely osteopenic.    IMPRESSION: No evidence of acute cardiopulmonary disease.    Electronically signed by:  Mekhi Kwon MD  9/18/2023 9:45 AM CDT Workstation: [id number]                                     Medications - No data to display  Medical Decision Making  Emergent evaluation of a 77-year-old female with history of hypertension, IBS, GI bleed in past, constipation, diverticulitis, and umbilical hernia, anemia, degenerative disc disease of the cervical and lumbar spine, arthritis,  chronic pain presents to the ER by EMS due to feeling weak and fatigued last night overnight had chills and sweats this morning woke up feeling nauseous and came to the ER by EMS glucose was 125 blood pressure 188/90 but patient reports she did not take her blood pressure medication yesterday or today.  She reports no vomiting.  She reports that she would not had a bowel movement in several days so took a Colace yesterday but had not yet had a bowel movement.  She reports she is passing gas and feels gassy.  No chest pain shortness of breath.  No URI symptoms or cough.  No fevers  On physical exam patient was mildly pale abdomen is soft and nontender with no normal bowel sounds she has a umbilical hernia that is soft and nontender easily reducible.  Normal cardiac and lung exam no conjunctival pallor.  No signs of dehydration normal strength throughout  MDM    Patient presents for emergent evaluation of acute weakness fatigue chills sweats nausea that poses a threat to life and/or bodily function.   Differential diagnosis includes but was not limited to gi bleed, constipation impaction gastroenteritis enteritis sepsis volvulus ACS COVID flu   In the ED patient found to have acute generalized weakness with nausea vomiting fatigue    I ordered labs and personally reviewed them.  Labs significant for see below  I ordered X-rays and personally reviewed them and reviewed the radiologist interpretation.  Xray significant for see below.    I ordered EKG and personally reviewed it.  EKG significant for see below    Discharge MDM     Patient was managed in the ED with no medications here.  She will take her blood pressure medication when she goes home    The response to treatment was good.  Patient digitally disimpacted herself here and had a bowel movement on the toilet as well as urinated.  She reports she is feeling improved.  Patient's  and son are at bedside they report that she gets constipated and feels this  way when she needs to have a bowel movement.  Now that she was at a bowel movement she feels improved they will bring her home.  Patient was discharged in stable condition.  Detailed return precautions discussed.  Patient was told to follow up with primary care physician or specialist based on their diagnosis  Cherry Card MD      Amount and/or Complexity of Data Reviewed  Labs: ordered.  Radiology: ordered.                               Clinical Impression:   Final diagnoses:  [R53.1] Weakness  [R11.0] Nausea (Primary)        ED Disposition Condition    Discharge Stable          ED Prescriptions    None       Follow-up Information       Follow up With Specialties Details Why Contact Info Additional Information    Alphonse Boothe III, MD Family Medicine Schedule an appointment as soon as possible for a visit  If your symptoms do not improve [address]  [address]  [facility] [address]  [phone]       [facility] - Emergency Dept Emergency Medicine Go to  If symptoms worsen [address] [address]  [phone] 1st floor             Cherry Card MD  09/18/23 0295

## 2023-09-19 ENCOUNTER — TELEPHONE (OUTPATIENT)
Dept: FAMILY MEDICINE | Facility: CLINIC | Age: 78
End: 2023-09-19
Payer: MEDICARE

## 2023-09-19 NOTE — TELEPHONE ENCOUNTER
----- Message from Marco Antonio Cope sent at 9/19/2023  3:50 PM CDT -----  Regarding:  Nurse info  Type: Needs Medical Advice    Who Called:  Froilan with [facility]    Best Call Back Number: [phone] (office)    Additional Information: PT was in ED yesterday. PT is asking for  Services again. Please send referral to [facility] to restart services.

## 2023-09-21 ENCOUNTER — TELEPHONE (OUTPATIENT)
Dept: FAMILY MEDICINE | Facility: CLINIC | Age: 78
End: 2023-09-21
Payer: MEDICARE

## 2023-09-29 ENCOUNTER — OFFICE VISIT (OUTPATIENT)
Dept: FAMILY MEDICINE | Facility: CLINIC | Age: 78
End: 2023-09-29
Payer: MEDICARE

## 2023-09-29 VITALS
OXYGEN SATURATION: 97 % | SYSTOLIC BLOOD PRESSURE: 98 MMHG | DIASTOLIC BLOOD PRESSURE: 60 MMHG | HEIGHT: 66 IN | HEART RATE: 105 BPM | WEIGHT: 156.88 LBS | BODY MASS INDEX: 25.21 KG/M2 | RESPIRATION RATE: 16 BRPM

## 2023-09-29 DIAGNOSIS — K56.41 FECAL IMPACTION: ICD-10-CM

## 2023-09-29 DIAGNOSIS — K52.9 GASTROENTERITIS: Primary | ICD-10-CM

## 2023-09-29 DIAGNOSIS — K42.9 UMBILICAL HERNIA WITHOUT OBSTRUCTION AND WITHOUT GANGRENE: ICD-10-CM

## 2023-09-29 DIAGNOSIS — D64.9 ANEMIA, UNSPECIFIED TYPE: ICD-10-CM

## 2023-09-29 DIAGNOSIS — M41.9 KYPHOSCOLIOSIS AND SCOLIOSIS: ICD-10-CM

## 2023-09-29 DIAGNOSIS — R11.0 NAUSEA: ICD-10-CM

## 2023-09-29 PROCEDURE — 1160F PR REVIEW ALL MEDS BY PRESCRIBER/CLIN PHARMACIST DOCUMENTED: ICD-10-PCS | Mod: CPTII,S$GLB,, | Performed by: FAMILY MEDICINE

## 2023-09-29 PROCEDURE — G0008 FLU VACCINE - QUADRIVALENT - ADJUVANTED: ICD-10-PCS | Mod: S$GLB,,, | Performed by: FAMILY MEDICINE

## 2023-09-29 PROCEDURE — 1100F PR PT FALLS ASSESS DOC 2+ FALLS/FALL W/INJURY/YR: ICD-10-PCS | Mod: CPTII,S$GLB,, | Performed by: FAMILY MEDICINE

## 2023-09-29 PROCEDURE — 3078F PR MOST RECENT DIASTOLIC BLOOD PRESSURE < 80 MM HG: ICD-10-PCS | Mod: CPTII,S$GLB,, | Performed by: FAMILY MEDICINE

## 2023-09-29 PROCEDURE — 90677 PNEUMOCOCCAL CONJUGATE VACCINE 20-VALENT: ICD-10-PCS | Mod: S$GLB,,, | Performed by: FAMILY MEDICINE

## 2023-09-29 PROCEDURE — 1159F MED LIST DOCD IN RCRD: CPT | Mod: CPTII,S$GLB,, | Performed by: FAMILY MEDICINE

## 2023-09-29 PROCEDURE — 90677 PCV20 VACCINE IM: CPT | Mod: S$GLB,,, | Performed by: FAMILY MEDICINE

## 2023-09-29 PROCEDURE — G0008 ADMIN INFLUENZA VIRUS VAC: HCPCS | Mod: S$GLB,,, | Performed by: FAMILY MEDICINE

## 2023-09-29 PROCEDURE — 3078F DIAST BP <80 MM HG: CPT | Mod: CPTII,S$GLB,, | Performed by: FAMILY MEDICINE

## 2023-09-29 PROCEDURE — 3288F PR FALLS RISK ASSESSMENT DOCUMENTED: ICD-10-PCS | Mod: CPTII,S$GLB,, | Performed by: FAMILY MEDICINE

## 2023-09-29 PROCEDURE — G0009 PNEUMOCOCCAL CONJUGATE VACCINE 20-VALENT: ICD-10-PCS | Mod: S$GLB,,, | Performed by: FAMILY MEDICINE

## 2023-09-29 PROCEDURE — 90694 VACC AIIV4 NO PRSRV 0.5ML IM: CPT | Mod: S$GLB,,, | Performed by: FAMILY MEDICINE

## 2023-09-29 PROCEDURE — 1100F PTFALLS ASSESS-DOCD GE2>/YR: CPT | Mod: CPTII,S$GLB,, | Performed by: FAMILY MEDICINE

## 2023-09-29 PROCEDURE — 1160F RVW MEDS BY RX/DR IN RCRD: CPT | Mod: CPTII,S$GLB,, | Performed by: FAMILY MEDICINE

## 2023-09-29 PROCEDURE — 3074F SYST BP LT 130 MM HG: CPT | Mod: CPTII,S$GLB,, | Performed by: FAMILY MEDICINE

## 2023-09-29 PROCEDURE — 1159F PR MEDICATION LIST DOCUMENTED IN MEDICAL RECORD: ICD-10-PCS | Mod: CPTII,S$GLB,, | Performed by: FAMILY MEDICINE

## 2023-09-29 PROCEDURE — 90694 FLU VACCINE - QUADRIVALENT - ADJUVANTED: ICD-10-PCS | Mod: S$GLB,,, | Performed by: FAMILY MEDICINE

## 2023-09-29 PROCEDURE — 3074F PR MOST RECENT SYSTOLIC BLOOD PRESSURE < 130 MM HG: ICD-10-PCS | Mod: CPTII,S$GLB,, | Performed by: FAMILY MEDICINE

## 2023-09-29 PROCEDURE — 99214 PR OFFICE/OUTPT VISIT, EST, LEVL IV, 30-39 MIN: ICD-10-PCS | Mod: 25,S$GLB,, | Performed by: FAMILY MEDICINE

## 2023-09-29 PROCEDURE — 3288F FALL RISK ASSESSMENT DOCD: CPT | Mod: CPTII,S$GLB,, | Performed by: FAMILY MEDICINE

## 2023-09-29 PROCEDURE — 99214 OFFICE O/P EST MOD 30 MIN: CPT | Mod: 25,S$GLB,, | Performed by: FAMILY MEDICINE

## 2023-09-29 PROCEDURE — G0009 ADMIN PNEUMOCOCCAL VACCINE: HCPCS | Mod: S$GLB,,, | Performed by: FAMILY MEDICINE

## 2023-10-01 PROBLEM — M41.9 KYPHOSCOLIOSIS AND SCOLIOSIS: Status: ACTIVE | Noted: 2023-10-01

## 2023-10-02 NOTE — PROGRESS NOTES
Subjective:       Patient ID: Froilan Ray is a 77 y.o. female.    Chief Complaint: Follow-up    Kyphosis scoliosis.  Nausea umbilical hernia.  Was seen in the emergency room had impaction.  Also some gastroenteritis.  Anemia hemoglobin 9.2 iron deficiency.  She stops iron infusion.  Not tolerate it.  MCV is only 80.  Transfused previously.  She does not tolerate oral iron all either.  Would prefer transfusion to either alternative.  GI did upper and lower endoscopies.    Physical examination.  Vital signs noted.  No acute distress.  Neck without bruit.  Chest clear.  Heart regular rate and rhythm.  Severe kyphosis and scoliosis.  Extremities without edema positive pedal pulses.  Umbilical hernia fairly large and easily reducible.          Objective:        Assessment:       1. Gastroenteritis    2. Nausea    3. Umbilical hernia without obstruction and without gangrene    4. Anemia, unspecified type    5. Fecal impaction    6. Kyphoscoliosis and scoliosis        Plan:       Gastroenteritis    Nausea    Umbilical hernia without obstruction and without gangrene    Anemia, unspecified type  -     POCT Hemocult Stool X3  -     CBC Auto Differential; Future; Expected date: 10/13/2023    Fecal impaction    Kyphoscoliosis and scoliosis    Other orders  -     Influenza - Quadrivalent (Adjuvanted)  -     Pneumococcal Conjugate Vaccine (20 Valent) (IM)    Stool for occult blood x3.  Flu shot high-dose.  CBC in 2 weeks.  Transfusion if dropping further.  Prevnar 20 today.  RSV vaccine discussed.

## 2023-10-03 ENCOUNTER — TELEPHONE (OUTPATIENT)
Dept: FAMILY MEDICINE | Facility: CLINIC | Age: 78
End: 2023-10-03
Payer: MEDICARE

## 2023-11-19 ENCOUNTER — HOSPITAL ENCOUNTER (EMERGENCY)
Facility: HOSPITAL | Age: 78
Discharge: HOME | End: 2023-11-19
Attending: STUDENT IN AN ORGANIZED HEALTH CARE EDUCATION/TRAINING PROGRAM
Payer: MEDICARE

## 2023-11-19 VITALS
RESPIRATION RATE: 20 BRPM | TEMPERATURE: 98 F | OXYGEN SATURATION: 100 % | DIASTOLIC BLOOD PRESSURE: 80 MMHG | HEIGHT: 66 IN | HEART RATE: 85 BPM | SYSTOLIC BLOOD PRESSURE: 172 MMHG | BODY MASS INDEX: 25.07 KG/M2 | WEIGHT: 156 LBS

## 2023-11-19 DIAGNOSIS — M54.32 LEFT SIDED SCIATICA: Primary | ICD-10-CM

## 2023-11-19 DIAGNOSIS — M25.552 LEFT HIP PAIN: ICD-10-CM

## 2023-11-19 PROCEDURE — 96372 THER/PROPH/DIAG INJ SC/IM: CPT | Performed by: STUDENT IN AN ORGANIZED HEALTH CARE EDUCATION/TRAINING PROGRAM

## 2023-11-19 PROCEDURE — 63600175 PHARM REV CODE 636 W HCPCS: Performed by: STUDENT IN AN ORGANIZED HEALTH CARE EDUCATION/TRAINING PROGRAM

## 2023-11-19 PROCEDURE — 99285 EMERGENCY DEPT VISIT HI MDM: CPT | Mod: 25

## 2023-11-19 RX ORDER — FENTANYL CITRATE 50 UG/ML
25 INJECTION, SOLUTION INTRAMUSCULAR; INTRAVENOUS
Status: COMPLETED | OUTPATIENT
Start: 2023-11-19 | End: 2023-11-19

## 2023-11-19 RX ADMIN — FENTANYL CITRATE 25 MCG: 50 INJECTION INTRAMUSCULAR; INTRAVENOUS at 04:11

## 2023-11-19 NOTE — ED NOTES
Assumed care:  Froilan Ray is awake, alert and oriented x 3, skin warm and dry, in NAD.  Patient with history of sciatica CO left leg pain radiating down to toes.    Patient identifiers for Froilan Ray checked and correct.  LOC:  Froilan Ray is awake, alert, and aware of environment with an appropriate affect. She is oriented x 3 and speaking appropriately.  APPEARANCE:  She is resting comfortably and in no acute distress. She is clean and well groomed, patient's clothing is properly fastened.  SKIN:  The skin is warm and dry. She has normal skin turgor and moist mucus membranes. Skin is intact; no bruising or breakdown noted.  MUSCULOSKELETAL:  She is moving all extremities well, no obvious deformities noted. Pulses intact.  Left leg pain  RESPIRATORY:  Airway is open and patent. Respirations are spontaneous and non-labored with normal effort and rate.  CARDIAC:  She has a normal rate and rhythm. No peripheral edema noted. Capillary refill < 3 seconds.  ABDOMEN:  No distention noted.  Soft and non-tender upon palpation.  hernia  NEUROLOGICAL:  PERRL. Facial expression is symmetrical. Hand grasps are equal bilaterally. Normal sensation in all extremities when touched with finger.  Allergies reported:  Review of patient's allergies indicates:  No Known Allergies

## 2023-11-19 NOTE — ED PROVIDER NOTES
Encounter Date: 11/19/2023       History     Chief Complaint   Patient presents with    Sciatica     Left side hx. Of same      78-year-old female presents with left hip pain.  Worsened over the last 24-48 hours no known trauma, fever or chills.  Patient currently on Percocets at home however not controlling pain.  Last dose was at noon today, patient brought in by EMS for further management evaluation.  No medications given in route per EMS    The history is provided by the patient.     Review of patient's allergies indicates:  No Known Allergies  Past Medical History:   Diagnosis Date    Anemia     Arthritis     Bleeding ulcer 07/2016    Chronic pain     DDD (degenerative disc disease), cervical     DDD (degenerative disc disease), lumbar     Depression     Disc degeneration, lumbosacral     Diverticulitis     Encounter for blood transfusion     Hypertension     IBS (irritable bowel syndrome)     Neuropathy of both feet     Seizures     none  since 2017    Umbilical hernia 2020     Past Surgical History:   Procedure Laterality Date    BACK SURGERY      BREAST BIOPSY      BREAST SURGERY Right     lumpectomy    CAUDAL EPIDURAL STEROID INJECTION N/A 01/28/2022    Procedure: Injection-steroid-epidural-caudal;  Surgeon: Luzmaria Marcelino MD;  [facility];  Service: Pain Management;  Laterality: N/A;    COLONOSCOPY N/A 01/14/2022    Procedure: COLONOSCOPY;  Surgeon: Abby Landers MD;  Location: [facility];  Service: Endoscopy;  Laterality: N/A;    ENDOSCOPIC ULTRASOUND OF UPPER GASTROINTESTINAL TRACT N/A 07/21/2020    Procedure: ULTRASOUND, UPPER GI TRACT, ENDOSCOPIC;  Surgeon: Marcio Nguyễn III, MD;  Location: [facility];  Service: Endoscopy;  Laterality: N/A;    ESOPHAGOGASTRODUODENOSCOPY N/A 01/14/2022    Procedure: EGD (ESOPHAGOGASTRODUODENOSCOPY);  Surgeon: Abby Landers MD;  Location: [facility];  Service: Endoscopy;  Laterality: N/A;    ESOPHAGOGASTRODUODENOSCOPY N/A 06/10/2022    Procedure: EGD  (ESOPHAGOGASTRODUODENOSCOPY);  Surgeon: Abby Landers MD;  Location: [facility] ENDO;  Service: Endoscopy;  Laterality: N/A;    EYE SURGERY      cataract    HYSTERECTOMY      INTRAMEDULLARY RODDING OF TROCHANTER OF FEMUR Left 12/11/2018    Procedure: INSERTION, INTRAMEDULLARY SANTOS, FEMUR, TROCHANTER;  Surgeon: Eulalio De La Cruz MD;  Location: [facility] OR;  Service: Orthopedics;  Laterality: Left;    SPINAL CORD STIMULATOR IMPLANT  09/18/2013    and removal    SPINE SURGERY  2006    lumbar L2-S1 decompression.    SPINE SURGERY      cervical decompression    TONSILLECTOMY       Family History   Problem Relation Age of Onset    Diabetes Mother     Hypertension Mother     Irritable bowel syndrome Mother     Diabetes Father         insulin dependent    Hypertension Father     Heart disease Father     Coronary artery disease Father     Depression Father     Diabetes Sister     Diabetes Brother     COPD Brother      Social History     Tobacco Use    Smoking status: Never    Smokeless tobacco: Never   Substance Use Topics    Alcohol use: No    Drug use: No     Review of Systems   All other systems reviewed and are negative.      Physical Exam     Initial Vitals [11/19/23 1628]   BP Pulse Resp Temp SpO2   (!) 169/88 89 20 98 °F (36.7 °C) 98 %      MAP       --         Physical Exam    Nursing note and vitals reviewed.  Constitutional: She appears well-developed and well-nourished.   Patient appears uncomfortable, in pain   HENT:   Head: Normocephalic and atraumatic.   Eyes: Right eye exhibits no discharge. Left eye exhibits no discharge.   Cardiovascular:  Normal rate and regular rhythm.           Pulmonary/Chest: Effort normal. No stridor. No respiratory distress.   Abdominal: Abdomen is soft. There is no abdominal tenderness.   Reducible umbilical hernia, no surrounding erythema necrosis   Musculoskeletal:         General: Tenderness present.      Comments: Left hip tenderness to palpation and pain with internal external  rotation no distal femur, knee tib-fib or other lower leg tenderness palpation.  No obvious deformity     Neurological: She is alert.   Skin: Skin is warm. No rash noted. No erythema.   No laceration or bruising or swelling         ED Course   Procedures  Labs Reviewed - No data to display       Imaging Results              CT Pelvis Without Contrast (In process)                      CT Lumbar Spine Without Contrast (In process)                      X-Ray Hip 2 or 3 views Left (with Pelvis when performed) (In process)                      Medications   fentaNYL 50 mcg/mL injection 25 mcg (25 mcg Intramuscular Given 11/19/23 0236)     Medical Decision Making  78-year-old female presents with back pain radiating to left lower extremity.  History of severe spinal disease as well as pelvic fractures and left hip fracture.  Radiographs obtained and then advanced to CT imaging of L-spine and left hip and pelvis with no acute traumatic injury.  No suspicion for septic arthritis at this time.  Pain control with IM fentanyl and  at bedside able to take patient home.  No suspicion for AAA    Amount and/or Complexity of Data Reviewed  Radiology: ordered.    Risk  Prescription drug management.               ED Course as of 11/19/23 2002   Sun Nov 19, 2023 1912 Pending CT imaging results from [facility] [KB]   1957 PATIENT REASSESSED PRIOR TO DISCHARGE FEELING MUCH BETTER,  AT BEDSIDE WILL DRIVE PATIENT HOME. [KB]   1957 CT imaging per [facility] no acute traumatic injury no fracture or dislocation [KB]      ED Course User Index  [KB] Manish Jesus Jr., DO                          Clinical Impression:  Final diagnoses:  [M25.552] Left hip pain  [M54.32] Left sided sciatica (Primary)          ED Disposition Condition    Discharge Stable          ED Prescriptions    None       Follow-up Information       Follow up With Specialties Details Why Contact Info Additional Information    [facility] Emergency  Medicine In 1 day As needed, If symptoms worsen [address] [place] [address] Dr Greg [place] [phone] 1st floor             Manish Jesus Jr., DO  11/19/23 2002

## 2023-11-20 RX ORDER — ESCITALOPRAM OXALATE 20 MG/1
20 TABLET ORAL
Qty: 90 TABLET | Refills: 1 | Status: ON HOLD | OUTPATIENT
Start: 2023-11-20 | End: 2024-01-27

## 2023-11-20 NOTE — TELEPHONE ENCOUNTER
Care Due:                  Date            Visit Type   Department     Provider  --------------------------------------------------------------------------------                                EP [facility]  Last Visit: 09-      [facility]   [facility]  Next Visit: None Scheduled  None         None Found                                                            Last  Test          Frequency    Reason                     Performed    Due Date  --------------------------------------------------------------------------------    Phosphate...  12 months..  alendronate..............  06- 06-    Vitamin D...  12 months..  alendronate..............  Not Found    Overdue    [employer] Embedded Care Due Messages. Reference number: [id number].   11/20/2023 3:56:10 AM CST

## 2023-11-20 NOTE — DISCHARGE INSTRUCTIONS
Follow up primary care physician within 1 week for repeat evaluation.  Return to ED for any worsening symptoms

## 2023-11-22 RX ORDER — AMLODIPINE AND BENAZEPRIL HYDROCHLORIDE 5; 20 MG/1; MG/1
1 CAPSULE ORAL DAILY
Qty: 90 CAPSULE | Refills: 3 | Status: SHIPPED | OUTPATIENT
Start: 2023-11-22

## 2023-11-22 NOTE — TELEPHONE ENCOUNTER
----- Message from Merline Calloway sent at 11/22/2023 10:33 AM CST -----  Type: Needs Medical Advice  Who Called:  pt  Symptoms (please be specific):  pt said her pharmacy been trying to get in touch w/ the office to fill her amlodipine-benazepril 5-20 mg (LOTREL) 5-20 mg per capsule and they can't get anyone to respond--said she need to speak to the nurse about getting her amlodipine-benazepril 5-20 mg (LOTREL) 5-20 mg per capsule refilled--please call and advise   Pharmacy name and phone #:    [id number] - [place] - [address] AT [facility] & [facility]  [address]  [place] [address]  Phone: [phone] Fax: [phone]      Best Call Back Number: [phone]    Additional Information: thank you

## 2023-11-22 NOTE — TELEPHONE ENCOUNTER
No care due was identified.  [facility] Embedded Care Due Messages. Reference number: [id number].   11/22/2023 10:41:58 AM CST

## 2023-12-06 ENCOUNTER — HOSPITAL ENCOUNTER (INPATIENT)
Facility: HOSPITAL | Age: 78
LOS: 1 days | Discharge: HOME HEALTH | DRG: 355 | End: 2023-12-08
Attending: EMERGENCY MEDICINE | Admitting: INTERNAL MEDICINE
Payer: MEDICARE

## 2023-12-06 DIAGNOSIS — K43.6 STRANGULATED HERNIA OF ABDOMINAL WALL: ICD-10-CM

## 2023-12-06 DIAGNOSIS — R07.9 CHEST PAIN: ICD-10-CM

## 2023-12-06 DIAGNOSIS — R10.9 ABDOMINAL PAIN, UNSPECIFIED ABDOMINAL LOCATION: Primary | ICD-10-CM

## 2023-12-06 LAB
ABO + RH BLD: NORMAL
ALBUMIN SERPL BCP-MCNC: 3.6 G/DL (ref 3.5–5.2)
ALP SERPL-CCNC: 110 U/L (ref 55–135)
ALT SERPL W/O P-5'-P-CCNC: 8 U/L (ref 10–44)
ANION GAP SERPL CALC-SCNC: 7 MMOL/L (ref 8–16)
AST SERPL-CCNC: 14 U/L (ref 10–40)
BASOPHILS # BLD AUTO: 0.03 K/UL (ref 0–0.2)
BASOPHILS NFR BLD: 0.4 % (ref 0–1.9)
BILIRUB SERPL-MCNC: 0.2 MG/DL (ref 0.1–1)
BILIRUB UR QL STRIP: NEGATIVE
BLD GP AB SCN CELLS X3 SERPL QL: NORMAL
BNP SERPL-MCNC: 72 PG/ML (ref 0–99)
BUN SERPL-MCNC: 13 MG/DL (ref 8–23)
CALCIUM SERPL-MCNC: 9.1 MG/DL (ref 8.7–10.5)
CHLORIDE SERPL-SCNC: 105 MMOL/L (ref 95–110)
CLARITY UR: CLEAR
CO2 SERPL-SCNC: 26 MMOL/L (ref 23–29)
COLOR UR: YELLOW
CREAT SERPL-MCNC: 0.5 MG/DL (ref 0.5–1.4)
CREAT SERPL-MCNC: 0.6 MG/DL (ref 0.5–1.4)
DIFFERENTIAL METHOD: ABNORMAL
EOSINOPHIL # BLD AUTO: 0 K/UL (ref 0–0.5)
EOSINOPHIL NFR BLD: 0.1 % (ref 0–8)
ERYTHROCYTE [DISTWIDTH] IN BLOOD BY AUTOMATED COUNT: 15.6 % (ref 11.5–14.5)
ERYTHROCYTE [DISTWIDTH] IN BLOOD BY AUTOMATED COUNT: 15.7 % (ref 11.5–14.5)
EST. GFR  (NO RACE VARIABLE): >60 ML/MIN/1.73 M^2
FERRITIN SERPL-MCNC: 13.7 NG/ML (ref 20–300)
GLUCOSE SERPL-MCNC: 113 MG/DL (ref 70–110)
GLUCOSE UR QL STRIP: NEGATIVE
HCT VFR BLD AUTO: 28.4 % (ref 37–48.5)
HCT VFR BLD AUTO: 30.9 % (ref 37–48.5)
HGB BLD-MCNC: 8.5 G/DL (ref 12–16)
HGB BLD-MCNC: 8.8 G/DL (ref 12–16)
HGB UR QL STRIP: NEGATIVE
IMM GRANULOCYTES # BLD AUTO: 0.06 K/UL (ref 0–0.04)
IMM GRANULOCYTES NFR BLD AUTO: 0.9 % (ref 0–0.5)
IRON SERPL-MCNC: 14 UG/DL (ref 30–160)
KETONES UR QL STRIP: NEGATIVE
LACTATE SERPL-SCNC: 0.7 MMOL/L (ref 0.5–1.9)
LEUKOCYTE ESTERASE UR QL STRIP: NEGATIVE
LIPASE SERPL-CCNC: 9 U/L (ref 4–60)
LYMPHOCYTES # BLD AUTO: 1.2 K/UL (ref 1–4.8)
LYMPHOCYTES NFR BLD: 17.4 % (ref 18–48)
MAGNESIUM SERPL-MCNC: 1.8 MG/DL (ref 1.6–2.6)
MCH RBC QN AUTO: 23.3 PG (ref 27–31)
MCH RBC QN AUTO: 23.4 PG (ref 27–31)
MCHC RBC AUTO-ENTMCNC: 28.5 G/DL (ref 32–36)
MCHC RBC AUTO-ENTMCNC: 29.9 G/DL (ref 32–36)
MCV RBC AUTO: 78 FL (ref 82–98)
MCV RBC AUTO: 82 FL (ref 82–98)
MONOCYTES # BLD AUTO: 0.3 K/UL (ref 0.3–1)
MONOCYTES NFR BLD: 4.5 % (ref 4–15)
NEUTROPHILS # BLD AUTO: 5.2 K/UL (ref 1.8–7.7)
NEUTROPHILS NFR BLD: 76.7 % (ref 38–73)
NITRITE UR QL STRIP: NEGATIVE
NRBC BLD-RTO: 0 /100 WBC
PH UR STRIP: 8 [PH] (ref 5–8)
PLATELET # BLD AUTO: 452 K/UL (ref 150–450)
PLATELET # BLD AUTO: 471 K/UL (ref 150–450)
PMV BLD AUTO: 9.4 FL (ref 9.2–12.9)
PMV BLD AUTO: 9.5 FL (ref 9.2–12.9)
POTASSIUM SERPL-SCNC: 4.2 MMOL/L (ref 3.5–5.1)
PROT SERPL-MCNC: 6.7 G/DL (ref 6–8.4)
PROT UR QL STRIP: NEGATIVE
RBC # BLD AUTO: 3.63 M/UL (ref 4–5.4)
RBC # BLD AUTO: 3.77 M/UL (ref 4–5.4)
SAMPLE: NORMAL
SATURATED IRON: 4 % (ref 20–50)
SODIUM SERPL-SCNC: 138 MMOL/L (ref 136–145)
SP GR UR STRIP: 1.02 (ref 1–1.03)
SPECIMEN OUTDATE: NORMAL
TOTAL IRON BINDING CAPACITY: 347 UG/DL (ref 250–450)
TRANSFERRIN SERPL-MCNC: 248 MG/DL (ref 200–375)
TROPONIN I SERPL HS-MCNC: 4.7 PG/ML (ref 0–14.9)
TROPONIN I SERPL HS-MCNC: 5.8 PG/ML (ref 0–14.9)
URN SPEC COLLECT METH UR: NORMAL
UROBILINOGEN UR STRIP-ACNC: NEGATIVE EU/DL
WBC # BLD AUTO: 6.13 K/UL (ref 3.9–12.7)
WBC # BLD AUTO: 6.83 K/UL (ref 3.9–12.7)

## 2023-12-06 PROCEDURE — 63600175 PHARM REV CODE 636 W HCPCS: Performed by: EMERGENCY MEDICINE

## 2023-12-06 PROCEDURE — 84466 ASSAY OF TRANSFERRIN: CPT | Performed by: EMERGENCY MEDICINE

## 2023-12-06 PROCEDURE — 82565 ASSAY OF CREATININE: CPT

## 2023-12-06 PROCEDURE — 83735 ASSAY OF MAGNESIUM: CPT | Performed by: EMERGENCY MEDICINE

## 2023-12-06 PROCEDURE — 85027 COMPLETE CBC AUTOMATED: CPT | Performed by: INTERNAL MEDICINE

## 2023-12-06 PROCEDURE — 82728 ASSAY OF FERRITIN: CPT | Performed by: EMERGENCY MEDICINE

## 2023-12-06 PROCEDURE — 93010 ELECTROCARDIOGRAM REPORT: CPT | Mod: ,,, | Performed by: GENERAL PRACTICE

## 2023-12-06 PROCEDURE — 99285 EMERGENCY DEPT VISIT HI MDM: CPT | Mod: 25

## 2023-12-06 PROCEDURE — 25000003 PHARM REV CODE 250: Performed by: INTERNAL MEDICINE

## 2023-12-06 PROCEDURE — 84484 ASSAY OF TROPONIN QUANT: CPT | Mod: 91 | Performed by: EMERGENCY MEDICINE

## 2023-12-06 PROCEDURE — G0378 HOSPITAL OBSERVATION PER HR: HCPCS

## 2023-12-06 PROCEDURE — 83690 ASSAY OF LIPASE: CPT | Performed by: EMERGENCY MEDICINE

## 2023-12-06 PROCEDURE — 93005 ELECTROCARDIOGRAM TRACING: CPT | Performed by: GENERAL PRACTICE

## 2023-12-06 PROCEDURE — 80053 COMPREHEN METABOLIC PANEL: CPT | Performed by: EMERGENCY MEDICINE

## 2023-12-06 PROCEDURE — 83605 ASSAY OF LACTIC ACID: CPT | Performed by: EMERGENCY MEDICINE

## 2023-12-06 PROCEDURE — 81003 URINALYSIS AUTO W/O SCOPE: CPT | Performed by: EMERGENCY MEDICINE

## 2023-12-06 PROCEDURE — 25500020 PHARM REV CODE 255: Performed by: INTERNAL MEDICINE

## 2023-12-06 PROCEDURE — 86900 BLOOD TYPING SEROLOGIC ABO: CPT | Performed by: EMERGENCY MEDICINE

## 2023-12-06 PROCEDURE — 85025 COMPLETE CBC W/AUTO DIFF WBC: CPT | Performed by: EMERGENCY MEDICINE

## 2023-12-06 PROCEDURE — 83880 ASSAY OF NATRIURETIC PEPTIDE: CPT | Performed by: EMERGENCY MEDICINE

## 2023-12-06 PROCEDURE — 93010 EKG 12-LEAD: ICD-10-PCS | Mod: ,,, | Performed by: GENERAL PRACTICE

## 2023-12-06 RX ORDER — IBUPROFEN 200 MG
16 TABLET ORAL
Status: DISCONTINUED | OUTPATIENT
Start: 2023-12-06 | End: 2023-12-08 | Stop reason: HOSPADM

## 2023-12-06 RX ORDER — TRAMADOL HYDROCHLORIDE 50 MG/1
2 TABLET ORAL EVERY 12 HOURS PRN
Status: ON HOLD | COMMUNITY
Start: 2023-11-28 | End: 2024-02-18 | Stop reason: HOSPADM

## 2023-12-06 RX ORDER — IBUPROFEN 200 MG
24 TABLET ORAL
Status: DISCONTINUED | OUTPATIENT
Start: 2023-12-06 | End: 2023-12-08 | Stop reason: HOSPADM

## 2023-12-06 RX ORDER — ESCITALOPRAM OXALATE 10 MG/1
10 TABLET ORAL DAILY
Status: DISCONTINUED | OUTPATIENT
Start: 2023-12-07 | End: 2023-12-08 | Stop reason: HOSPADM

## 2023-12-06 RX ORDER — OXYCODONE AND ACETAMINOPHEN 10; 325 MG/1; MG/1
1 TABLET ORAL EVERY 6 HOURS PRN
Status: DISCONTINUED | OUTPATIENT
Start: 2023-12-06 | End: 2023-12-08 | Stop reason: HOSPADM

## 2023-12-06 RX ORDER — OXYCODONE AND ACETAMINOPHEN 10; 325 MG/1; MG/1
1 TABLET ORAL
Status: ON HOLD | COMMUNITY
Start: 2023-11-22 | End: 2024-02-18 | Stop reason: HOSPADM

## 2023-12-06 RX ORDER — ENOXAPARIN SODIUM 100 MG/ML
1 INJECTION SUBCUTANEOUS EVERY 12 HOURS
Status: DISCONTINUED | OUTPATIENT
Start: 2023-12-06 | End: 2023-12-06

## 2023-12-06 RX ORDER — NALOXONE HCL 0.4 MG/ML
0.02 VIAL (ML) INJECTION
Status: DISCONTINUED | OUTPATIENT
Start: 2023-12-06 | End: 2023-12-08 | Stop reason: HOSPADM

## 2023-12-06 RX ORDER — AMIODARONE HYDROCHLORIDE 200 MG/1
200 TABLET ORAL DAILY
Status: DISCONTINUED | OUTPATIENT
Start: 2023-12-07 | End: 2023-12-08 | Stop reason: HOSPADM

## 2023-12-06 RX ORDER — GLUCAGON 1 MG
1 KIT INJECTION
Status: DISCONTINUED | OUTPATIENT
Start: 2023-12-06 | End: 2023-12-08 | Stop reason: HOSPADM

## 2023-12-06 RX ORDER — METOPROLOL TARTRATE 50 MG/1
50 TABLET ORAL 3 TIMES DAILY
Status: DISCONTINUED | OUTPATIENT
Start: 2023-12-06 | End: 2023-12-08 | Stop reason: HOSPADM

## 2023-12-06 RX ORDER — SODIUM CHLORIDE 0.9 % (FLUSH) 0.9 %
10 SYRINGE (ML) INJECTION EVERY 12 HOURS PRN
Status: DISCONTINUED | OUTPATIENT
Start: 2023-12-06 | End: 2023-12-08 | Stop reason: HOSPADM

## 2023-12-06 RX ORDER — SODIUM CHLORIDE, SODIUM LACTATE, POTASSIUM CHLORIDE, CALCIUM CHLORIDE 600; 310; 30; 20 MG/100ML; MG/100ML; MG/100ML; MG/100ML
1000 INJECTION, SOLUTION INTRAVENOUS
Status: COMPLETED | OUTPATIENT
Start: 2023-12-06 | End: 2023-12-06

## 2023-12-06 RX ADMIN — OXYCODONE HYDROCHLORIDE AND ACETAMINOPHEN 1 TABLET: 10; 325 TABLET ORAL at 07:12

## 2023-12-06 RX ADMIN — SODIUM CHLORIDE, SODIUM LACTATE, POTASSIUM CHLORIDE, AND CALCIUM CHLORIDE 1000 ML: .6; .31; .03; .02 INJECTION, SOLUTION INTRAVENOUS at 03:12

## 2023-12-06 RX ADMIN — METOPROLOL TARTRATE 50 MG: 50 TABLET, FILM COATED ORAL at 09:12

## 2023-12-06 RX ADMIN — IOHEXOL 100 ML: 350 INJECTION, SOLUTION INTRAVENOUS at 04:12

## 2023-12-06 NOTE — ED PROVIDER NOTES
Encounter Date: 12/6/2023       History     Chief Complaint   Patient presents with    Hernia     Pt states hernia for past year, past three days pt states it has grew in size and increased in pain. Lbm yesterday. Denies trauma, n/v     78-year-old female with history of hypertension, iron deficiency anemia, atrial fibrillation, on Eliquis, spinal stenosis, peripheral neuropathy, seizure disorder, umbilical hernia.  Patient presents emergency department via EMS with complaint of increasing 3 day history of abdominal pain which was located around the hernia site.  Patient states since last night however has had increased intractable abdominal pain which much more pronounced size 2 umbilical hernia.  She states she could not get it to be reduced.  Upon EMS arrival patient found with 10/10 abdominal pain.  Hernia did appear discolored in nature.  Patient was given 100 mcg of fentanyl prior to arrival.  Patient states pain is improved however definitely reproducible to direct palpation.      Review of patient's allergies indicates:  No Known Allergies  Past Medical History:   Diagnosis Date    Anemia     Arthritis     Bleeding ulcer 07/2016    Chronic pain     DDD (degenerative disc disease), cervical     DDD (degenerative disc disease), lumbar     Depression     Disc degeneration, lumbosacral     Diverticulitis     Encounter for blood transfusion     Hypertension     IBS (irritable bowel syndrome)     Neuropathy of both feet     Seizures     none  since 2017    Umbilical hernia 2020     Past Surgical History:   Procedure Laterality Date    BACK SURGERY      BREAST BIOPSY      BREAST SURGERY Right     lumpectomy    CAUDAL EPIDURAL STEROID INJECTION N/A 01/28/2022    Procedure: Injection-steroid-epidural-caudal;  Surgeon: Luzmaria Marcelino MD;  Location: [facility] OR;  Service: Pain Management;  Laterality: N/A;    COLONOSCOPY N/A 01/14/2022    Procedure: COLONOSCOPY;  Surgeon: Abby Landers MD;  Location: [facility];   Service: Endoscopy;  Laterality: N/A;    ENDOSCOPIC ULTRASOUND OF UPPER GASTROINTESTINAL TRACT N/A 07/21/2020    Procedure: ULTRASOUND, UPPER GI TRACT, ENDOSCOPIC;  Surgeon: Marcio Nguyễn III, MD;  Location: [facility] ENDO;  Service: Endoscopy;  Laterality: N/A;    ESOPHAGOGASTRODUODENOSCOPY N/A 01/14/2022    Procedure: EGD (ESOPHAGOGASTRODUODENOSCOPY);  Surgeon: Abby Landers MD;  Location: [facility] ENDO;  Service: Endoscopy;  Laterality: N/A;    ESOPHAGOGASTRODUODENOSCOPY N/A 06/10/2022    Procedure: EGD (ESOPHAGOGASTRODUODENOSCOPY);  Surgeon: Abby Landers MD;  Location: [facility] ENDO;  Service: Endoscopy;  Laterality: N/A;    EYE SURGERY      cataract    HYSTERECTOMY      INTRAMEDULLARY RODDING OF TROCHANTER OF FEMUR Left 12/11/2018    Procedure: INSERTION, INTRAMEDULLARY SANTOS, FEMUR, TROCHANTER;  Surgeon: Eulalio De La Cruz MD;  [facility];  Service: Orthopedics;  Laterality: Left;    SPINAL CORD STIMULATOR IMPLANT  09/18/2013    and removal    SPINE SURGERY  2006    lumbar L2-S1 decompression.    SPINE SURGERY      cervical decompression    TONSILLECTOMY       Family History   Problem Relation Age of Onset    Diabetes Mother     Hypertension Mother     Irritable bowel syndrome Mother     Diabetes Father         insulin dependent    Hypertension Father     Heart disease Father     Coronary artery disease Father     Depression Father     Diabetes Sister     Diabetes Brother     COPD Brother      Social History     Tobacco Use    Smoking status: Never    Smokeless tobacco: Never   Substance Use Topics    Alcohol use: No    Drug use: No     Review of Systems   Constitutional:  Negative for fever.   HENT:  Negative for sore throat.    Respiratory:  Negative for shortness of breath.    Cardiovascular:  Negative for chest pain.   Gastrointestinal:  Negative for nausea.   Genitourinary:  Negative for dysuria.   Musculoskeletal:  Negative for back pain.   Skin:  Negative for rash.   Neurological:  Negative for  weakness.   Hematological:  Does not bruise/bleed easily.       Physical Exam     Initial Vitals   BP Pulse Resp Temp SpO2   12/06/23 1430 12/06/23 1430 12/06/23 1430 12/06/23 1500 12/06/23 1430   (!) 151/90 90 18 97.1 °F (36.2 °C) 100 %      MAP       --                Physical Exam    Nursing note and vitals reviewed.  Constitutional: She appears well-developed and well-nourished.   HENT:   Head: Normocephalic and atraumatic.   Nose: Nose normal.   Mouth/Throat: Oropharynx is clear and moist.   Eyes: Conjunctivae and EOM are normal. Pupils are equal, round, and reactive to light.   Neck: Neck supple. No thyromegaly present. No tracheal deviation present.   Normal range of motion.  Cardiovascular:  Normal rate, regular rhythm, normal heart sounds and intact distal pulses.     Exam reveals no gallop and no friction rub.       No murmur heard.  Pulmonary/Chest: No stridor. No respiratory distress.   Course bilateral breath sounds no adventitious sounds   Abdominal: Abdomen is soft. Bowel sounds are normal. She exhibits distension and mass. There is abdominal tenderness.   Patient with large palpable umbilical hernia with faint bluish discoloration.  On direct palpation patient with increased pain.  Bowel sounds present. There is no rebound and no guarding.   Musculoskeletal:         General: No edema. Normal range of motion.      Cervical back: Normal range of motion and neck supple.     Lymphadenopathy:     She has no cervical adenopathy.   Neurological: She is alert and oriented to person, place, and time. She has normal strength and normal reflexes. GCS score is 15. GCS eye subscore is 4. GCS verbal subscore is 5. GCS motor subscore is 6.   Skin: Skin is warm and dry. Capillary refill takes less than 2 seconds.   Psychiatric: She has a normal mood and affect.               ED Course   Procedures  Labs Reviewed   CBC W/ AUTO DIFFERENTIAL - Abnormal; Notable for the following components:       Result Value    RBC  3.77 (*)     Hemoglobin 8.8 (*)     Hematocrit 30.9 (*)     MCH 23.3 (*)     MCHC 28.5 (*)     RDW 15.7 (*)     Platelets 452 (*)     Immature Granulocytes 0.9 (*)     Immature Grans (Abs) 0.06 (*)     Gran % 76.7 (*)     Lymph % 17.4 (*)     All other components within normal limits   COMPREHENSIVE METABOLIC PANEL - Abnormal; Notable for the following components:    Glucose 113 (*)     ALT 8 (*)     Anion Gap 7 (*)     All other components within normal limits   IRON AND TIBC - Abnormal; Notable for the following components:    Iron 14 (*)     Saturated Iron 4 (*)     All other components within normal limits   FERRITIN - Abnormal; Notable for the following components:    Ferritin 13.7 (*)     All other components within normal limits   CBC WITHOUT DIFFERENTIAL - Abnormal; Notable for the following components:    RBC 3.63 (*)     Hemoglobin 8.5 (*)     Hematocrit 28.4 (*)     MCV 78 (*)     MCH 23.4 (*)     MCHC 29.9 (*)     RDW 15.6 (*)     Platelets 471 (*)     All other components within normal limits   MAGNESIUM   TROPONIN I HIGH SENSITIVITY   B-TYPE NATRIURETIC PEPTIDE   LIPASE   URINALYSIS, REFLEX TO URINE CULTURE    Narrative:     Specimen Source->Urine   LACTIC ACID, PLASMA   TROPONIN I HIGH SENSITIVITY   TYPE & SCREEN   ISTAT CREATININE   POCT CREATININE        ECG Results              EKG 12-lead (In process)  Result time 12/06/23 14:53:46      In process by Interface, Lab In [facility] (12/06/23 14:53:46)                   Narrative:    Test Reason : R07.9,    Vent. Rate : 093 BPM     Atrial Rate : 093 BPM     P-R Int : 156 ms          QRS Dur : 078 ms      QT Int : 350 ms       P-R-T Axes : 055 017 076 degrees     QTc Int : 435 ms    Normal sinus rhythm  Normal ECG  When compared with ECG of 18-SEP-2023 09:40,  No significant change was found    Referred By: [email or web address]   SELF           Confirmed By:                                   Imaging Results              CT Abdomen Pelvis With IV Contrast NO Oral  Contrast (Final result)  Result time 12/06/23 16:51:07      Final result by Richard Bernard MD (12/06/23 16:51:07)                   Narrative:    CMS MANDATED QUALITY DATA - CT RADIATION - 436    One or more of the following dose-optimizing techniques was utilized for this exam: automated exposure control, adjustment of the mA and/or kV according to patient size, and/or use of iterative reconstruction technique.      HISTORY:Suspected incarcerated umbilical hernia is the history provided.    TECHNIQUE: Serial axial images were obtained from the domes of the diaphragm to the pubic symphysis with 100ccs Omnipaque 350 intravenous contrast. Oral contrast was not administered for this exam. Coronal and sagittal reconstructions were performed. Patient received 478 mGy-cm of radiation exposure from this exam.    COMPARISON: Comparison to previous study dated November 30, 2022.    FINDINGS:  Lung bases: Lung bases are free of infiltrate or pleural effusion.    Liver: Liver appears stable in size and contour. Gallbladder appears nondistended. Several scattered small subcentimeter hepatic hypodensities are present most consistent with small hepatic cysts.    Pancreas: Pancreas is atrophic.    Spleen: Normal.    Adrenal glands: Mild adrenal gland thickening is present bilaterally with stable interval appearance.    Kidneys and ureters: Bilateral functioning kidneys are noted. No hydronephrosis is seen. Nonobstructing 4 mm left lower pole intrarenal calculi is again noted. Ureters appear nondilated.    Bladder: Bladder is unremarkable.    Bowel: Small hiatal hernia is present. Stomach appears nondistended. Fluid density is visualized in nondistended small bowel loops. Moderate volume of colonic fecal material is present in the ace ascending and transverse colon. No evidence of bowel obstruction is seen.    Peritoneum: Edema of the mesentery is visualized in the anterior peritoneal cavity beneath umbilicus. This edema may be  secondary to hernia which has been recently reduced. Is there a history of reduction of the umbilical hernia.    Retroperitoneum: Unremarkable.    Lymph nodes: No evidence of abdominal or pelvic adenopathy is seen.    Abdominal wall: Small umbilical hernia is present with thickening of the skin in the region the umbilicus which may be secondary to recent reduction of umbilical hernia.    Bones: Postop changes of the left femur present. Severe degenerative changes of the lumbar spine are noted with levoscoliosis.    IMPRESSION:  1. Small fat-containing umbilical hernia with periumbilical skin thickening and mesenteric edema in the central abdomen beneath the site of the umbilical hernia. These imaging findings may be secondary to recent reduction of bowel containing umbilical hernia. No evidence of bowel obstruction or perforation is seen.        Electronically signed by:  Richard Bernard MD  12/06/2023 04:51 PM CST Workstation: [id number]                                     X-Ray Chest AP Portable (Final result)  Result time 12/06/23 15:00:21      Final result by Ronan Neely MD (12/06/23 15:00:21)                   Narrative:    Reason: Chest Pain    FINDINGS:    Portable chest with comparison chest x-ray September 18, 2023 show normal cardiomediastinal silhouette.  Lungs are clear. Pulmonary vasculature is normal. No acute osseous abnormality.    IMPRESSION:    No acute cardiopulmonary abnormality.    Electronically signed by:  Ronan Neely DO  12/06/2023 03:00 PM CST Workstation: [id number]                                     Medications   sodium chloride 0.9% flush 10 mL (has no administration in time range)   naloxone 0.4 mg/mL injection 0.02 mg (has no administration in time range)   glucose chewable tablet 16 g (has no administration in time range)   glucose chewable tablet 24 g (has no administration in time range)   dextrose 50% injection 12.5 g (has no administration in time range)   dextrose 50%  injection 25 g (has no administration in time range)   glucagon (human recombinant) injection 1 mg (has no administration in time range)   amiodarone tablet 200 mg (has no administration in time range)   EScitalopram oxalate tablet 10 mg (has no administration in time range)   metoprolol tartrate (LOPRESSOR) tablet 50 mg (50 mg Oral Given 12/6/23 2132)   oxyCODONE-acetaminophen  mg per tablet 1 tablet (1 tablet Oral Given 12/6/23 1932)   lactated ringers infusion (1,000 mLs Intravenous New Bag 12/6/23 1502)   iohexoL (OMNIPAQUE 350) injection 100 mL (100 mLs Intravenous Given 12/6/23 1616)     Medical Decision Making  Amount and/or Complexity of Data Reviewed  Labs: ordered.  Radiology: ordered.    Risk  Prescription drug management.               ED Course as of 12/06/23 2236   Wed Dec 06, 2023   1933 Patient seen evaluated emergency department.  Patient here with complaint of ventral hernia/umbilical hernia which had increasing size over last 72 hours.  Patient was seen emergency department for evaluation for suspected incarcerated umbilical hernia.  CT abdomen pelvis showed Small fat-containing umbilical hernia with periumbilical skin thickening and mesenteric edema in the central abdomen beneath the site of the umbilical hernia. These imaging findings may be secondary to recent reduction of bowel containing umbilical hernia. No evidence of bowel obstruction or perforation is seen.  Case discussed with Dr. Escobar.  Patient will be admitted for suspected surgical correction in a.m..  This was due to persistent abdominal pain.      [RM]      ED Course User Index  [RM] Otoniel Soto MD               Medical Decision Making:   Initial Assessment:   78-year-old female with history of hypertension, iron deficiency anemia, atrial fibrillation, on Eliquis, spinal stenosis, peripheral neuropathy, seizure disorder, umbilical hernia.  Patient presents emergency department via EMS with complaint of increasing 3  day history of abdominal pain which was located around the hernia site.  Patient states since last night however has had increased intractable abdominal pain which much more pronounced size 2 umbilical hernia.  She states she could not get it to be reduced.  Upon EMS arrival patient found with 10/10 abdominal pain.  Hernia did appear discolored in nature.  Patient was given 100 mcg of fentanyl prior to arrival.  Patient states pain is improved however definitely reproducible to direct palpation.    Differential Diagnosis:   Incarcerated ventral hernia, abdominal hernia, bowel obstruction,  Clinical Tests:   Lab Tests: Ordered and Reviewed  Radiological Study: Ordered and Reviewed  Medical Tests: Ordered and Reviewed             Clinical Impression:  Final diagnoses:  [R10.9] Abdominal pain, unspecified abdominal location (Primary)          ED Disposition Condition    Observation                 Otoniel Soto MD  12/06/23 2308       Otoniel Soto MD  12/06/23 [id number]

## 2023-12-06 NOTE — HPI
78-year-old female with history of hypertension, iron deficiency anemia, atrial fibrillation, on Eliquis( was not taking ), spinal stenosis, peripheral neuropathy, seizure disorder, umbilical hernia.came with abdominal pain at the site of previous hernia .  She states she could not get it to be reduced and came with EMS . But on exam , easily reducible and minimal pain at exam . Patient also prefer to do elective surgery but family wants to do it now if possible .  CT abdomen ordered and will need to decide upon the result. Surgeon was called  in ER.   Patient was given 100 mcg of fentanyl prior to arrival and may be causing less pain at this time .

## 2023-12-06 NOTE — H&P
Former[facility] - Emergency Dept  Hospital Medicine  History & Physical    Patient Name: Froilan Ray  MRN: [id number]  Patient Class: OP- Observation  Admission Date: 12/6/2023  Attending Physician: Pierre Floyd MD   Primary Care Provider: Alphonse Boothe III, MD         Patient information was obtained from patient and ER records.     Subjective:     Principal Problem:<principal problem not specified>    Chief Complaint:   Chief Complaint   Patient presents with    Hernia     Pt states hernia for past year, past three days pt states it has grew in size and increased in pain. Lbm yesterday. Denies trauma, n/v        HPI: 78-year-old female with history of hypertension, iron deficiency anemia, atrial fibrillation, on Eliquis( was not taking ), spinal stenosis, peripheral neuropathy, seizure disorder, umbilical hernia.came with abdominal pain at the site of previous hernia .  She states she could not get it to be reduced and came with EMS . But on exam , easily reducible and minimal pain at exam . Patient also prefer to do elective surgery but family wants to do it now if possible .  CT abdomen ordered and will need to decide upon the result. Surgeon was called  in ER.   Patient was given 100 mcg of fentanyl prior to arrival and may be causing less pain at this time .      Past Medical History:   Diagnosis Date    Anemia     Arthritis     Bleeding ulcer 07/2016    Chronic pain     DDD (degenerative disc disease), cervical     DDD (degenerative disc disease), lumbar     Depression     Disc degeneration, lumbosacral     Diverticulitis     Encounter for blood transfusion     Hypertension     IBS (irritable bowel syndrome)     Neuropathy of both feet     Seizures     none  since 2017    Umbilical hernia 2020       Past Surgical History:   Procedure Laterality Date    BACK SURGERY      BREAST BIOPSY      BREAST SURGERY Right     lumpectomy    CAUDAL EPIDURAL STEROID INJECTION N/A 01/28/2022     Procedure: Injection-steroid-epidural-caudal;  Surgeon: Luzmaria Marcelino MD;  Location: [facility] OR;  Service: Pain Management;  Laterality: N/A;    COLONOSCOPY N/A 01/14/2022    Procedure: COLONOSCOPY;  Surgeon: Abby Landers MD;  Location: [facility] ENDO;  Service: Endoscopy;  Laterality: N/A;    ENDOSCOPIC ULTRASOUND OF UPPER GASTROINTESTINAL TRACT N/A 07/21/2020    Procedure: ULTRASOUND, UPPER GI TRACT, ENDOSCOPIC;  Surgeon: Marcio Nguyễn III, MD;  Location: [facility] ENDO;  Service: Endoscopy;  Laterality: N/A;    ESOPHAGOGASTRODUODENOSCOPY N/A 01/14/2022    Procedure: EGD (ESOPHAGOGASTRODUODENOSCOPY);  Surgeon: Abby Landers MD;  Location: [facility] ENDO;  Service: Endoscopy;  Laterality: N/A;    ESOPHAGOGASTRODUODENOSCOPY N/A 06/10/2022    Procedure: EGD (ESOPHAGOGASTRODUODENOSCOPY);  Surgeon: Abby Landers MD;  Location: [facility] ENDO;  Service: Endoscopy;  Laterality: N/A;    EYE SURGERY      cataract    HYSTERECTOMY      INTRAMEDULLARY RODDING OF TROCHANTER OF FEMUR Left 12/11/2018    Procedure: INSERTION, INTRAMEDULLARY SANTOS, FEMUR, TROCHANTER;  Surgeon: Eulalio De La Cruz MD;  Location: [facility] OR;  Service: Orthopedics;  Laterality: Left;    SPINAL CORD STIMULATOR IMPLANT  09/18/2013    and removal    SPINE SURGERY  2006    lumbar L2-S1 decompression.    SPINE SURGERY      cervical decompression    TONSILLECTOMY         Review of patient's allergies indicates:  No Known Allergies    No current facility-administered medications on file prior to encounter.     Current Outpatient Medications on File Prior to Encounter   Medication Sig    acetaminophen (TYLENOL) 325 MG tablet Take 650 mg by mouth every 8 (eight) hours as needed.    alendronate (FOSAMAX) 70 MG tablet Take 1 tablet (70 mg total) by mouth every 7 days.    amiodarone (PACERONE) 200 MG Tab Take 1 tablet (200 mg total) by mouth once daily. (Patient not taking: Reported on 5/16/2023)    amlodipine-benazepril 5-20 mg (LOTREL) 5-20 mg per capsule Take 1  capsule by mouth once daily.    apixaban (ELIQUIS) 2.5 mg Tab Take 1 tablet (2.5 mg total) by mouth 2 (two) times daily. (Patient not taking: Reported on 5/16/2023)    ascorbic acid, vitamin C, (VITAMIN C) 250 MG tablet Take 1 tablet (250 mg total) by mouth once daily. (Patient not taking: Reported on 9/29/2023)    cyanocobalamin (VITAMIN B-12) 100 MCG tablet Take 1 tablet (100 mcg total) by mouth once daily. (Patient not taking: Reported on 9/29/2023)    cyclobenzaprine (FLEXERIL) 10 MG tablet TAKE 1 TABLET(10 MG) BY MOUTH THREE TIMES DAILY AS NEEDED FOR MUSCLE SPASMS (Patient taking differently: Take 10 mg by mouth 3 (three) times daily as needed.)    docusate sodium (COLACE) 100 MG capsule Take 1 capsule (100 mg total) by mouth 2 (two) times daily. (Patient not taking: Reported on 9/29/2023)    EScitalopram oxalate (LEXAPRO) 20 MG tablet TAKE 1 TABLET(20 MG) BY MOUTH EVERY DAY    ferrous sulfate 325 (65 FE) MG EC tablet Take 1 tablet (325 mg total) by mouth once daily. (Patient not taking: Reported on 9/29/2023)    furosemide (LASIX) 40 MG tablet TAKE 1 TABLET(40 MG) BY MOUTH DAILY AS NEEDED FOR SWELLING (Patient not taking: Reported on 9/29/2023)    metoprolol tartrate (LOPRESSOR) 50 MG tablet Take 1 tablet (50 mg total) by mouth 3 (three) times daily. (Patient not taking: Reported on 5/16/2023)    omeprazole (PRILOSEC) 40 MG capsule TAKE 1 CAPSULE(40 MG) BY MOUTH EVERY MORNING    polyethylene glycol (GLYCOLAX) 17 gram PwPk Take 17 g by mouth 2 (two) times daily. (Patient not taking: Reported on 9/29/2023)    potassium chloride (KLOR-CON) 10 MEQ TbSR TAKE 1 TABLET(10 MEQ) BY MOUTH EVERY DAY (Patient not taking: Reported on 9/29/2023)    pregabalin (LYRICA) 200 MG Cap TAKE 1 CAPSULE(200 MG) BY MOUTH THREE TIMES DAILY (Patient taking differently: Take 200 mg by mouth 3 (three) times daily. TAKE 1 CAPSULE(200 MG) BY MOUTH THREE TIMES DAILY)    [DISCONTINUED] pantoprazole (PROTONIX) 40 MG tablet Take 1 tablet (40 mg  total) by mouth 2 (two) times daily.     Family History       Problem Relation (Age of Onset)    COPD Brother    Coronary artery disease Father    Depression Father    Diabetes Mother, Father, Sister, Brother    Heart disease Father    Hypertension Mother, Father    Irritable bowel syndrome Mother          Tobacco Use    Smoking status: Never    Smokeless tobacco: Never   Substance and Sexual Activity    Alcohol use: No    Drug use: No    Sexual activity: Not Currently     Review of Systems   Constitutional:  Negative for activity change and fever.   Respiratory:  Negative for shortness of breath and wheezing.    Cardiovascular:  Negative for chest pain and leg swelling.   Gastrointestinal:  Positive for abdominal pain. Negative for abdominal distention.   Genitourinary:  Negative for difficulty urinating.   Skin:  Negative for color change.   Neurological:  Negative for dizziness and facial asymmetry.   Psychiatric/Behavioral:  Negative for agitation and confusion.      Objective:     Vital Signs (Most Recent):  Temp: 97.1 °F (36.2 °C) (12/06/23 1500)  Pulse: 95 (12/06/23 1500)  Resp: (!) 35 (12/06/23 1500)  BP: (!) 147/71 (12/06/23 1500)  SpO2: 96 % (12/06/23 1500) Vital Signs (24h Range):  Temp:  [97.1 °F (36.2 °C)] 97.1 °F (36.2 °C)  Pulse:  [90-95] 95  Resp:  [18-35] 35  SpO2:  [96 %-100 %] 96 %  BP: (147-151)/(71-90) 147/71     Weight: 68 kg (150 lb)  Body mass index is 24.21 kg/m².     Physical Exam  Vitals and nursing note reviewed.   HENT:      Head: Normocephalic and atraumatic.   Eyes:      Conjunctiva/sclera: Conjunctivae normal.   Neck:      Vascular: No JVD.   Cardiovascular:      Rate and Rhythm: Normal rate.      Heart sounds: Normal heart sounds.   Pulmonary:      Effort: Pulmonary effort is normal.      Breath sounds: Normal breath sounds.   Abdominal:      General: Bowel sounds are normal.      Palpations: Abdomen is soft.      Tenderness: There is abdominal tenderness.   Skin:     General: Skin  is warm.   Neurological:      Mental Status: She is alert and oriented to person, place, and time.                Significant Labs: All pertinent labs within the past 24 hours have been reviewed.  CBC:   Recent Labs   Lab 12/06/23  1500   WBC 6.83   HGB 8.8*   HCT 30.9*   *     CMP:   Recent Labs   Lab 12/06/23  1500      K 4.2      CO2 26   *   BUN 13   CREATININE 0.6   CALCIUM 9.1   PROT 6.7   ALBUMIN 3.6   BILITOT 0.2   ALKPHOS 110   AST 14   ALT 8*   ANIONGAP 7*     Cardiac Markers:   Recent Labs   Lab 12/06/23  1500   BNP 72       Significant Imaging: I have reviewed all pertinent imaging results/findings within the past 24 hours.  Assessment/Plan:     Active Hospital Problems    Diagnosis    Strangulated hernia of abdominal wall    Spinal stenosis    Anticoagulated    S/P ORIF (open reduction internal fixation) fracture    Atrial fibrillation    Iron deficiency anemia    Severe anemia    Essential hypertension    Peripheral neuropathy    Seizure disorder        PLAN     NPO   IVF   Pain killer   Appear hernia is reducible at this time and minimal pain . But patient got fentanyl   Do CT abd pel  Follow surgeon .  Need medical reconciliation when ready     VTE Risk Mitigation (From admission, onward)           Ordered     IP VTE HIGH RISK PATIENT  Once         12/06/23 1535     Place sequential compression device  Until discontinued         12/06/23 1535                       On 12/06/2023, patient should be placed in hospital observation services under my care.             Pierre Floyd MD  Department of Hospital Medicine  [facility] - Emergency Dept

## 2023-12-06 NOTE — PHARMACY MED REC
"Admission Medication History     The home medication history was taken by Talib Fernández.    You may go to "Admission" then "Reconcile Home Medications" tabs to review and/or act upon these items.     The home medication list has been updated by the Pharmacy department.   Please read ALL comments highlighted in yellow.   Please address this information as you see fit.    Feel free to contact us if you have any questions or require assistance.      The medications listed below were removed from the home medication list. Please reorder if appropriate:  Patient reports no longer taking the following medication(s):  Vitamin C  Vitamin B 12  Ferrous Sulfate 325 mg  Potassium Chloride    Medications listed below were obtained from: Patient/family and Analytic software- [employer]  No current facility-administered medications on file prior to encounter.     Current Outpatient Medications on File Prior to Encounter   Medication Sig Dispense Refill    acetaminophen (TYLENOL) 325 MG tablet Take 650 mg by mouth every 8 (eight) hours as needed for Pain.      amlodipine-benazepril 5-20 mg (LOTREL) 5-20 mg per capsule Take 1 capsule by mouth once daily. 90 capsule 3    cyclobenzaprine (FLEXERIL) 10 MG tablet TAKE 1 TABLET(10 MG) BY MOUTH THREE TIMES DAILY AS NEEDED FOR MUSCLE SPASMS (Patient taking differently: Take 10 mg by mouth 3 (three) times daily as needed for Muscle spasms.) 90 tablet 2    docusate sodium (COLACE) 100 MG capsule Take 1 capsule (100 mg total) by mouth 2 (two) times daily. 60 capsule 1    EScitalopram oxalate (LEXAPRO) 20 MG tablet TAKE 1 TABLET(20 MG) BY MOUTH EVERY DAY (Patient taking differently: Take 20 mg by mouth every evening.) 90 tablet 1    omeprazole (PRILOSEC) 40 MG capsule TAKE 1 CAPSULE(40 MG) BY MOUTH EVERY MORNING (Patient taking differently: Take 40 mg by mouth every morning.) 90 capsule 2    oxyCODONE-acetaminophen (PERCOCET)  mg per tablet Take 1 tablet by mouth every 4 to 6 hours as " needed for Pain.      pregabalin (LYRICA) 200 MG Cap TAKE 1 CAPSULE(200 MG) BY MOUTH THREE TIMES DAILY (Patient taking differently: Take 200 mg by mouth 3 (three) times daily. TAKE 1 CAPSULE(200 MG) BY MOUTH THREE TIMES DAILY) 90 capsule 2    traMADoL (ULTRAM) 50 mg tablet Take 2 tablets by mouth every 12 (twelve) hours as needed for Pain.      amiodarone (PACERONE) 200 MG Tab Take 1 tablet (200 mg total) by mouth once daily. (Patient not taking: Reported on 12/6/2023) 30 tablet 1    apixaban (ELIQUIS) 2.5 mg Tab Take 1 tablet (2.5 mg total) by mouth 2 (two) times daily. (Patient not taking: Reported on 12/6/2023) 60 tablet 1    furosemide (LASIX) 40 MG tablet TAKE 1 TABLET(40 MG) BY MOUTH DAILY AS NEEDED FOR SWELLING (Patient taking differently: Take 40 mg by mouth daily as needed (leg swelling).) 90 tablet 1    metoprolol tartrate (LOPRESSOR) 50 MG tablet Take 1 tablet (50 mg total) by mouth 3 (three) times daily. (Patient not taking: Reported on 12/6/2023) 90 tablet 1    [DISCONTINUED] alendronate (FOSAMAX) 70 MG tablet Take 1 tablet (70 mg total) by mouth every 7 days. 13 tablet 3    [DISCONTINUED] ascorbic acid, vitamin C, (VITAMIN C) 250 MG tablet Take 1 tablet (250 mg total) by mouth once daily. (Patient not taking: Reported on 9/29/2023) 30 tablet 0    [DISCONTINUED] cyanocobalamin (VITAMIN B-12) 100 MCG tablet Take 1 tablet (100 mcg total) by mouth once daily. (Patient not taking: Reported on 9/29/2023) 30 tablet 0    [DISCONTINUED] ferrous sulfate 325 (65 FE) MG EC tablet Take 1 tablet (325 mg total) by mouth once daily. (Patient not taking: Reported on 9/29/2023) 90 tablet 1    [DISCONTINUED] pantoprazole (PROTONIX) 40 MG tablet Take 1 tablet (40 mg total) by mouth 2 (two) times daily. 60 tablet 4    [DISCONTINUED] polyethylene glycol (GLYCOLAX) 17 gram PwPk Take 17 g by mouth 2 (two) times daily. (Patient not taking: Reported on 9/29/2023) 60 packet 1    [DISCONTINUED] potassium chloride (KLOR-CON) 10  MEQ TbSR TAKE 1 TABLET(10 MEQ) BY MOUTH EVERY DAY (Patient not taking: Reported on 9/29/2023) 90 tablet 1         Talib Fernández  EXT 1922                .

## 2023-12-06 NOTE — SUBJECTIVE & OBJECTIVE
Past Medical History:   Diagnosis Date    Anemia     Arthritis     Bleeding ulcer 07/2016    Chronic pain     DDD (degenerative disc disease), cervical     DDD (degenerative disc disease), lumbar     Depression     Disc degeneration, lumbosacral     Diverticulitis     Encounter for blood transfusion     Hypertension     IBS (irritable bowel syndrome)     Neuropathy of both feet     Seizures     none  since 2017    Umbilical hernia 2020       Past Surgical History:   Procedure Laterality Date    BACK SURGERY      BREAST BIOPSY      BREAST SURGERY Right     lumpectomy    CAUDAL EPIDURAL STEROID INJECTION N/A 01/28/2022    Procedure: Injection-steroid-epidural-caudal;  Surgeon: Luzmaria Marcelino MD;  Location: [facility] OR;  Service: Pain Management;  Laterality: N/A;    COLONOSCOPY N/A 01/14/2022    Procedure: COLONOSCOPY;  Surgeon: Abby Landers MD;  Location: [facility];  Service: Endoscopy;  Laterality: N/A;    ENDOSCOPIC ULTRASOUND OF UPPER GASTROINTESTINAL TRACT N/A 07/21/2020    Procedure: ULTRASOUND, UPPER GI TRACT, ENDOSCOPIC;  Surgeon: Marcio Ngyuễn III, MD;  Location: [facility];  Service: Endoscopy;  Laterality: N/A;    ESOPHAGOGASTRODUODENOSCOPY N/A 01/14/2022    Procedure: EGD (ESOPHAGOGASTRODUODENOSCOPY);  Surgeon: Abby Landers MD;  Location: [facility];  Service: Endoscopy;  Laterality: N/A;    ESOPHAGOGASTRODUODENOSCOPY N/A 06/10/2022    Procedure: EGD (ESOPHAGOGASTRODUODENOSCOPY);  Surgeon: Abby Landers MD;  Location: [facility];  Service: Endoscopy;  Laterality: N/A;    EYE SURGERY      cataract    HYSTERECTOMY      INTRAMEDULLARY RODDING OF TROCHANTER OF FEMUR Left 12/11/2018    Procedure: INSERTION, INTRAMEDULLARY SANTOS, FEMUR, TROCHANTER;  Surgeon: Eulalio De La Cruz MD;  [facility];  Service: Orthopedics;  Laterality: Left;    SPINAL CORD STIMULATOR IMPLANT  09/18/2013    and removal    SPINE SURGERY  2006    lumbar L2-S1 decompression.    SPINE SURGERY      cervical  decompression    TONSILLECTOMY         Review of patient's allergies indicates:  No Known Allergies    No current facility-administered medications on file prior to encounter.     Current Outpatient Medications on File Prior to Encounter   Medication Sig    acetaminophen (TYLENOL) 325 MG tablet Take 650 mg by mouth every 8 (eight) hours as needed.    alendronate (FOSAMAX) 70 MG tablet Take 1 tablet (70 mg total) by mouth every 7 days.    amiodarone (PACERONE) 200 MG Tab Take 1 tablet (200 mg total) by mouth once daily. (Patient not taking: Reported on 5/16/2023)    amlodipine-benazepril 5-20 mg (LOTREL) 5-20 mg per capsule Take 1 capsule by mouth once daily.    apixaban (ELIQUIS) 2.5 mg Tab Take 1 tablet (2.5 mg total) by mouth 2 (two) times daily. (Patient not taking: Reported on 5/16/2023)    ascorbic acid, vitamin C, (VITAMIN C) 250 MG tablet Take 1 tablet (250 mg total) by mouth once daily. (Patient not taking: Reported on 9/29/2023)    cyanocobalamin (VITAMIN B-12) 100 MCG tablet Take 1 tablet (100 mcg total) by mouth once daily. (Patient not taking: Reported on 9/29/2023)    cyclobenzaprine (FLEXERIL) 10 MG tablet TAKE 1 TABLET(10 MG) BY MOUTH THREE TIMES DAILY AS NEEDED FOR MUSCLE SPASMS (Patient taking differently: Take 10 mg by mouth 3 (three) times daily as needed.)    docusate sodium (COLACE) 100 MG capsule Take 1 capsule (100 mg total) by mouth 2 (two) times daily. (Patient not taking: Reported on 9/29/2023)    EScitalopram oxalate (LEXAPRO) 20 MG tablet TAKE 1 TABLET(20 MG) BY MOUTH EVERY DAY    ferrous sulfate 325 (65 FE) MG EC tablet Take 1 tablet (325 mg total) by mouth once daily. (Patient not taking: Reported on 9/29/2023)    furosemide (LASIX) 40 MG tablet TAKE 1 TABLET(40 MG) BY MOUTH DAILY AS NEEDED FOR SWELLING (Patient not taking: Reported on 9/29/2023)    metoprolol tartrate (LOPRESSOR) 50 MG tablet Take 1 tablet (50 mg total) by mouth 3 (three) times daily. (Patient not taking: Reported on  5/16/2023)    omeprazole (PRILOSEC) 40 MG capsule TAKE 1 CAPSULE(40 MG) BY MOUTH EVERY MORNING    polyethylene glycol (GLYCOLAX) 17 gram PwPk Take 17 g by mouth 2 (two) times daily. (Patient not taking: Reported on 9/29/2023)    potassium chloride (KLOR-CON) 10 MEQ TbSR TAKE 1 TABLET(10 MEQ) BY MOUTH EVERY DAY (Patient not taking: Reported on 9/29/2023)    pregabalin (LYRICA) 200 MG Cap TAKE 1 CAPSULE(200 MG) BY MOUTH THREE TIMES DAILY (Patient taking differently: Take 200 mg by mouth 3 (three) times daily. TAKE 1 CAPSULE(200 MG) BY MOUTH THREE TIMES DAILY)    [DISCONTINUED] pantoprazole (PROTONIX) 40 MG tablet Take 1 tablet (40 mg total) by mouth 2 (two) times daily.     Family History       Problem Relation (Age of Onset)    COPD Brother    Coronary artery disease Father    Depression Father    Diabetes Mother, Father, Sister, Brother    Heart disease Father    Hypertension Mother, Father    Irritable bowel syndrome Mother          Tobacco Use    Smoking status: Never    Smokeless tobacco: Never   Substance and Sexual Activity    Alcohol use: No    Drug use: No    Sexual activity: Not Currently     Review of Systems   Constitutional:  Negative for activity change and fever.   Respiratory:  Negative for shortness of breath and wheezing.    Cardiovascular:  Negative for chest pain and leg swelling.   Gastrointestinal:  Positive for abdominal pain. Negative for abdominal distention.   Genitourinary:  Negative for difficulty urinating.   Skin:  Negative for color change.   Neurological:  Negative for dizziness and facial asymmetry.   Psychiatric/Behavioral:  Negative for agitation and confusion.      Objective:     Vital Signs (Most Recent):  Temp: 97.1 °F (36.2 °C) (12/06/23 1500)  Pulse: 95 (12/06/23 1500)  Resp: (!) 35 (12/06/23 1500)  BP: (!) 147/71 (12/06/23 1500)  SpO2: 96 % (12/06/23 1500) Vital Signs (24h Range):  Temp:  [97.1 °F (36.2 °C)] 97.1 °F (36.2 °C)  Pulse:  [90-95] 95  Resp:  [18-35] 35  SpO2:  [96  %-100 %] 96 %  BP: (147-151)/(71-90) 147/71     Weight: 68 kg (150 lb)  Body mass index is 24.21 kg/m².     Physical Exam  Vitals and nursing note reviewed.   HENT:      Head: Normocephalic and atraumatic.   Eyes:      Conjunctiva/sclera: Conjunctivae normal.   Neck:      Vascular: No JVD.   Cardiovascular:      Rate and Rhythm: Normal rate.      Heart sounds: Normal heart sounds.   Pulmonary:      Effort: Pulmonary effort is normal.      Breath sounds: Normal breath sounds.   Abdominal:      General: Bowel sounds are normal.      Palpations: Abdomen is soft.      Tenderness: There is abdominal tenderness.   Skin:     General: Skin is warm.   Neurological:      Mental Status: She is alert and oriented to person, place, and time.                Significant Labs: All pertinent labs within the past 24 hours have been reviewed.  CBC:   Recent Labs   Lab 12/06/23  1500   WBC 6.83   HGB 8.8*   HCT 30.9*   *     CMP:   Recent Labs   Lab 12/06/23  1500      K 4.2      CO2 26   *   BUN 13   CREATININE 0.6   CALCIUM 9.1   PROT 6.7   ALBUMIN 3.6   BILITOT 0.2   ALKPHOS 110   AST 14   ALT 8*   ANIONGAP 7*     Cardiac Markers:   Recent Labs   Lab 12/06/23  1500   BNP 72       Significant Imaging: I have reviewed all pertinent imaging results/findings within the past 24 hours.

## 2023-12-07 ENCOUNTER — ANESTHESIA (OUTPATIENT)
Dept: SURGERY | Facility: HOSPITAL | Age: 78
DRG: 355 | End: 2023-12-07
Payer: MEDICARE

## 2023-12-07 ENCOUNTER — ANESTHESIA EVENT (OUTPATIENT)
Dept: SURGERY | Facility: HOSPITAL | Age: 78
DRG: 355 | End: 2023-12-07
Payer: MEDICARE

## 2023-12-07 LAB
ANION GAP SERPL CALC-SCNC: 10 MMOL/L (ref 8–16)
BUN SERPL-MCNC: 10 MG/DL (ref 8–23)
CALCIUM SERPL-MCNC: 9.4 MG/DL (ref 8.7–10.5)
CHLORIDE SERPL-SCNC: 105 MMOL/L (ref 95–110)
CO2 SERPL-SCNC: 22 MMOL/L (ref 23–29)
CREAT SERPL-MCNC: 0.5 MG/DL (ref 0.5–1.4)
EST. GFR  (NO RACE VARIABLE): >60 ML/MIN/1.73 M^2
GLUCOSE SERPL-MCNC: 107 MG/DL (ref 70–110)
POTASSIUM SERPL-SCNC: 3.8 MMOL/L (ref 3.5–5.1)
SODIUM SERPL-SCNC: 137 MMOL/L (ref 136–145)

## 2023-12-07 PROCEDURE — 99900031 HC PATIENT EDUCATION (STAT)

## 2023-12-07 PROCEDURE — 36415 COLL VENOUS BLD VENIPUNCTURE: CPT | Performed by: INTERNAL MEDICINE

## 2023-12-07 PROCEDURE — 80048 BASIC METABOLIC PNL TOTAL CA: CPT | Performed by: INTERNAL MEDICINE

## 2023-12-07 PROCEDURE — 25000003 PHARM REV CODE 250: Performed by: STUDENT IN AN ORGANIZED HEALTH CARE EDUCATION/TRAINING PROGRAM

## 2023-12-07 PROCEDURE — 21400001 HC TELEMETRY ROOM

## 2023-12-07 PROCEDURE — 49592 RPR AA HRN 1ST < 3 NCR/STRN: CPT | Mod: ,,, | Performed by: SURGERY

## 2023-12-07 PROCEDURE — 36000706: Performed by: SURGERY

## 2023-12-07 PROCEDURE — D9220A PRA ANESTHESIA: Mod: ANES,,, | Performed by: ANESTHESIOLOGY

## 2023-12-07 PROCEDURE — 27000221 HC OXYGEN, UP TO 24 HOURS

## 2023-12-07 PROCEDURE — 63600175 PHARM REV CODE 636 W HCPCS: Performed by: STUDENT IN AN ORGANIZED HEALTH CARE EDUCATION/TRAINING PROGRAM

## 2023-12-07 PROCEDURE — 25000003 PHARM REV CODE 250: Performed by: INTERNAL MEDICINE

## 2023-12-07 PROCEDURE — D9220A PRA ANESTHESIA: Mod: CRNA,,, | Performed by: STUDENT IN AN ORGANIZED HEALTH CARE EDUCATION/TRAINING PROGRAM

## 2023-12-07 PROCEDURE — 63600175 PHARM REV CODE 636 W HCPCS: Performed by: ANESTHESIOLOGY

## 2023-12-07 PROCEDURE — 37000008 HC ANESTHESIA 1ST 15 MINUTES: Performed by: SURGERY

## 2023-12-07 PROCEDURE — D9220A PRA ANESTHESIA: ICD-10-PCS | Mod: ANES,,, | Performed by: ANESTHESIOLOGY

## 2023-12-07 PROCEDURE — 25000003 PHARM REV CODE 250: Performed by: ANESTHESIOLOGY

## 2023-12-07 PROCEDURE — 37000009 HC ANESTHESIA EA ADD 15 MINS: Performed by: SURGERY

## 2023-12-07 PROCEDURE — 88302 TISSUE EXAM BY PATHOLOGIST: CPT | Mod: TC | Performed by: PATHOLOGY

## 2023-12-07 PROCEDURE — C9290 INJ, BUPIVACAINE LIPOSOME: HCPCS | Performed by: SURGERY

## 2023-12-07 PROCEDURE — 71000039 HC RECOVERY, EACH ADD'L HOUR: Performed by: SURGERY

## 2023-12-07 PROCEDURE — D9220A PRA ANESTHESIA: ICD-10-PCS | Mod: CRNA,,, | Performed by: STUDENT IN AN ORGANIZED HEALTH CARE EDUCATION/TRAINING PROGRAM

## 2023-12-07 PROCEDURE — 71000033 HC RECOVERY, INTIAL HOUR: Performed by: SURGERY

## 2023-12-07 PROCEDURE — 25000003 PHARM REV CODE 250: Performed by: SURGERY

## 2023-12-07 PROCEDURE — 49592 PR REPAIR ANT ABD HERNIA INITIAL < 3 CM INCARC/STRANG: ICD-10-PCS | Mod: ,,, | Performed by: SURGERY

## 2023-12-07 PROCEDURE — 63600175 PHARM REV CODE 636 W HCPCS: Performed by: SURGERY

## 2023-12-07 PROCEDURE — 36000707: Performed by: SURGERY

## 2023-12-07 RX ORDER — PREGABALIN 75 MG/1
150 CAPSULE ORAL 3 TIMES DAILY
Status: DISCONTINUED | OUTPATIENT
Start: 2023-12-07 | End: 2023-12-07

## 2023-12-07 RX ORDER — EPHEDRINE SULFATE 50 MG/ML
INJECTION, SOLUTION INTRAVENOUS
Status: DISCONTINUED | OUTPATIENT
Start: 2023-12-07 | End: 2023-12-07

## 2023-12-07 RX ORDER — CEFAZOLIN SODIUM 1 G/3ML
INJECTION, POWDER, FOR SOLUTION INTRAMUSCULAR; INTRAVENOUS
Status: DISCONTINUED | OUTPATIENT
Start: 2023-12-07 | End: 2023-12-07

## 2023-12-07 RX ORDER — PROPOFOL 10 MG/ML
VIAL (ML) INTRAVENOUS
Status: DISCONTINUED | OUTPATIENT
Start: 2023-12-07 | End: 2023-12-07

## 2023-12-07 RX ORDER — SODIUM CHLORIDE, SODIUM LACTATE, POTASSIUM CHLORIDE, CALCIUM CHLORIDE 600; 310; 30; 20 MG/100ML; MG/100ML; MG/100ML; MG/100ML
INJECTION, SOLUTION INTRAVENOUS CONTINUOUS PRN
Status: DISCONTINUED | OUTPATIENT
Start: 2023-12-07 | End: 2023-12-07

## 2023-12-07 RX ORDER — HYDROMORPHONE HYDROCHLORIDE 1 MG/ML
0.2 INJECTION, SOLUTION INTRAMUSCULAR; INTRAVENOUS; SUBCUTANEOUS EVERY 5 MIN PRN
Status: DISCONTINUED | OUTPATIENT
Start: 2023-12-07 | End: 2023-12-07 | Stop reason: HOSPADM

## 2023-12-07 RX ORDER — DEXAMETHASONE SODIUM PHOSPHATE 4 MG/ML
INJECTION, SOLUTION INTRA-ARTICULAR; INTRALESIONAL; INTRAMUSCULAR; INTRAVENOUS; SOFT TISSUE
Status: DISCONTINUED | OUTPATIENT
Start: 2023-12-07 | End: 2023-12-07

## 2023-12-07 RX ORDER — MIDAZOLAM HYDROCHLORIDE 1 MG/ML
INJECTION INTRAMUSCULAR; INTRAVENOUS
Status: DISCONTINUED | OUTPATIENT
Start: 2023-12-07 | End: 2023-12-07

## 2023-12-07 RX ORDER — ROCURONIUM BROMIDE 10 MG/ML
INJECTION, SOLUTION INTRAVENOUS
Status: DISCONTINUED | OUTPATIENT
Start: 2023-12-07 | End: 2023-12-07

## 2023-12-07 RX ORDER — FENTANYL CITRATE 50 UG/ML
INJECTION, SOLUTION INTRAMUSCULAR; INTRAVENOUS
Status: DISCONTINUED | OUTPATIENT
Start: 2023-12-07 | End: 2023-12-07

## 2023-12-07 RX ORDER — SUCCINYLCHOLINE CHLORIDE 20 MG/ML
INJECTION INTRAMUSCULAR; INTRAVENOUS
Status: DISCONTINUED | OUTPATIENT
Start: 2023-12-07 | End: 2023-12-07

## 2023-12-07 RX ORDER — DEXMEDETOMIDINE HYDROCHLORIDE 100 UG/ML
INJECTION, SOLUTION INTRAVENOUS
Status: DISCONTINUED | OUTPATIENT
Start: 2023-12-07 | End: 2023-12-07

## 2023-12-07 RX ORDER — ONDANSETRON 2 MG/ML
4 INJECTION INTRAMUSCULAR; INTRAVENOUS DAILY PRN
Status: DISCONTINUED | OUTPATIENT
Start: 2023-12-07 | End: 2023-12-07 | Stop reason: HOSPADM

## 2023-12-07 RX ORDER — LIDOCAINE HYDROCHLORIDE 20 MG/ML
JELLY TOPICAL
Status: DISCONTINUED | OUTPATIENT
Start: 2023-12-07 | End: 2023-12-07

## 2023-12-07 RX ORDER — PREGABALIN 75 MG/1
150 CAPSULE ORAL 3 TIMES DAILY
Status: DISCONTINUED | OUTPATIENT
Start: 2023-12-07 | End: 2023-12-08 | Stop reason: HOSPADM

## 2023-12-07 RX ORDER — LIDOCAINE HYDROCHLORIDE 20 MG/ML
INJECTION, SOLUTION EPIDURAL; INFILTRATION; INTRACAUDAL; PERINEURAL
Status: DISCONTINUED | OUTPATIENT
Start: 2023-12-07 | End: 2023-12-07

## 2023-12-07 RX ORDER — CYCLOBENZAPRINE HCL 10 MG
10 TABLET ORAL 3 TIMES DAILY PRN
Status: DISCONTINUED | OUTPATIENT
Start: 2023-12-07 | End: 2023-12-08 | Stop reason: HOSPADM

## 2023-12-07 RX ORDER — OXYCODONE HYDROCHLORIDE 5 MG/1
5 TABLET ORAL
Status: COMPLETED | OUTPATIENT
Start: 2023-12-07 | End: 2023-12-07

## 2023-12-07 RX ORDER — ACETAMINOPHEN 10 MG/ML
INJECTION, SOLUTION INTRAVENOUS
Status: DISCONTINUED | OUTPATIENT
Start: 2023-12-07 | End: 2023-12-07

## 2023-12-07 RX ORDER — ONDANSETRON 2 MG/ML
INJECTION INTRAMUSCULAR; INTRAVENOUS
Status: DISCONTINUED | OUTPATIENT
Start: 2023-12-07 | End: 2023-12-07

## 2023-12-07 RX ORDER — BUPIVACAINE HYDROCHLORIDE AND EPINEPHRINE 5; 5 MG/ML; UG/ML
INJECTION, SOLUTION EPIDURAL; INTRACAUDAL; PERINEURAL
Status: DISCONTINUED | OUTPATIENT
Start: 2023-12-07 | End: 2023-12-07 | Stop reason: HOSPADM

## 2023-12-07 RX ORDER — FAMOTIDINE 10 MG/ML
INJECTION INTRAVENOUS
Status: DISCONTINUED | OUTPATIENT
Start: 2023-12-07 | End: 2023-12-07

## 2023-12-07 RX ADMIN — CYCLOBENZAPRINE 10 MG: 10 TABLET, FILM COATED ORAL at 02:12

## 2023-12-07 RX ADMIN — PREGABALIN 150 MG: 75 CAPSULE ORAL at 01:12

## 2023-12-07 RX ADMIN — HYDROMORPHONE HYDROCHLORIDE 0.2 MG: 1 INJECTION, SOLUTION INTRAMUSCULAR; INTRAVENOUS; SUBCUTANEOUS at 11:12

## 2023-12-07 RX ADMIN — PROPOFOL 110 MG: 10 INJECTION, EMULSION INTRAVENOUS at 10:12

## 2023-12-07 RX ADMIN — DEXMEDETOMIDINE HYDROCHLORIDE 6 MCG: 100 INJECTION, SOLUTION INTRAVENOUS at 10:12

## 2023-12-07 RX ADMIN — CYCLOBENZAPRINE 10 MG: 10 TABLET, FILM COATED ORAL at 01:12

## 2023-12-07 RX ADMIN — LIDOCAINE HYDROCHLORIDE 4 ML: 20 JELLY TOPICAL at 10:12

## 2023-12-07 RX ADMIN — HYDROMORPHONE HYDROCHLORIDE 0.2 MG: 1 INJECTION, SOLUTION INTRAMUSCULAR; INTRAVENOUS; SUBCUTANEOUS at 12:12

## 2023-12-07 RX ADMIN — ROCURONIUM BROMIDE 5 MG: 10 INJECTION, SOLUTION INTRAVENOUS at 10:12

## 2023-12-07 RX ADMIN — PREGABALIN 150 MG: 75 CAPSULE ORAL at 08:12

## 2023-12-07 RX ADMIN — MIDAZOLAM HYDROCHLORIDE 2 MG: 1 INJECTION, SOLUTION INTRAMUSCULAR; INTRAVENOUS at 10:12

## 2023-12-07 RX ADMIN — ACETAMINOPHEN 1000 MG: 10 INJECTION, SOLUTION INTRAVENOUS at 10:12

## 2023-12-07 RX ADMIN — OXYCODONE HYDROCHLORIDE AND ACETAMINOPHEN 1 TABLET: 10; 325 TABLET ORAL at 08:12

## 2023-12-07 RX ADMIN — LIDOCAINE HYDROCHLORIDE 100 MG: 20 INJECTION, SOLUTION INTRAVENOUS at 10:12

## 2023-12-07 RX ADMIN — FAMOTIDINE 20 MG: 10 INJECTION, SOLUTION INTRAVENOUS at 10:12

## 2023-12-07 RX ADMIN — EPHEDRINE SULFATE 15 MG: 50 INJECTION INTRAVENOUS at 11:12

## 2023-12-07 RX ADMIN — FENTANYL CITRATE 50 MCG: 50 INJECTION, SOLUTION INTRAMUSCULAR; INTRAVENOUS at 11:12

## 2023-12-07 RX ADMIN — EPHEDRINE SULFATE 10 MG: 50 INJECTION INTRAVENOUS at 10:12

## 2023-12-07 RX ADMIN — CEFAZOLIN 2 G: 330 INJECTION, POWDER, FOR SOLUTION INTRAMUSCULAR; INTRAVENOUS at 10:12

## 2023-12-07 RX ADMIN — METOPROLOL TARTRATE 50 MG: 50 TABLET, FILM COATED ORAL at 02:12

## 2023-12-07 RX ADMIN — Medication 200 MG: at 10:12

## 2023-12-07 RX ADMIN — FENTANYL CITRATE 50 MCG: 50 INJECTION, SOLUTION INTRAMUSCULAR; INTRAVENOUS at 10:12

## 2023-12-07 RX ADMIN — ONDANSETRON 4 MG: 2 INJECTION INTRAMUSCULAR; INTRAVENOUS at 10:12

## 2023-12-07 RX ADMIN — ESCITALOPRAM OXALATE 10 MG: 10 TABLET ORAL at 08:12

## 2023-12-07 RX ADMIN — AMIODARONE HYDROCHLORIDE 200 MG: 200 TABLET ORAL at 09:12

## 2023-12-07 RX ADMIN — PREGABALIN 150 MG: 75 CAPSULE ORAL at 02:12

## 2023-12-07 RX ADMIN — OXYCODONE HYDROCHLORIDE AND ACETAMINOPHEN 1 TABLET: 10; 325 TABLET ORAL at 02:12

## 2023-12-07 RX ADMIN — METOPROLOL TARTRATE 50 MG: 50 TABLET, FILM COATED ORAL at 08:12

## 2023-12-07 RX ADMIN — EPHEDRINE SULFATE 25 MG: 50 INJECTION INTRAVENOUS at 10:12

## 2023-12-07 RX ADMIN — DEXMEDETOMIDINE HYDROCHLORIDE 6 MCG: 100 INJECTION, SOLUTION INTRAVENOUS at 11:12

## 2023-12-07 RX ADMIN — SODIUM CHLORIDE, SODIUM LACTATE, POTASSIUM CHLORIDE, AND CALCIUM CHLORIDE: .6; .31; .03; .02 INJECTION, SOLUTION INTRAVENOUS at 10:12

## 2023-12-07 RX ADMIN — DEXAMETHASONE SODIUM PHOSPHATE 4 MG: 4 INJECTION, SOLUTION INTRAMUSCULAR; INTRAVENOUS at 10:12

## 2023-12-07 RX ADMIN — OXYCODONE HYDROCHLORIDE AND ACETAMINOPHEN 1 TABLET: 10; 325 TABLET ORAL at 01:12

## 2023-12-07 RX ADMIN — OXYCODONE HYDROCHLORIDE 5 MG: 5 TABLET ORAL at 11:12

## 2023-12-07 NOTE — PLAN OF CARE
Patient taken to room via bed at this time. Awake and alert not distressful. HEATHER Chapa at bedside to assume care of patient.

## 2023-12-07 NOTE — OP NOTE
Umbilical hernia repair- primary  Procedure Note    Date of procedure:   12/07/2023    Indications:  78-year-old female presents with strangulated umbilical hernia.  This reduced in the ER and she is observed overnight.  She is here for definitive repair.    Pre-operative Diagnosis:  Strangulated umbilical hernia    Post-operative Diagnosis: Same    Surgeon: Vipul Escobar MD    Assistants: none    Anesthesia: General endotracheal anesthesia    ASA Class: 3    Procedure Details   The patient was seen in the Holding Room. The risks, benefits, complications, treatment options, and expected outcomes were discussed with the patient. The possibilities of reaction to medication, pulmonary aspiration, perforation of viscus, bleeding, recurrent infection, the need for additional procedures, failure to diagnose a condition, and creating a complication requiring transfusion or operation were discussed with the patient. The patient concurred with the proposed plan, giving informed consent. The site of surgery properly noted/marked. The patient was taken to Operating Room 7 identified as Froilan Ray and the procedure verified as umbilical hernia repair. A Time Out was held and the above information confirmed.    Full general anesthesia was induced with orotracheal intubation. The patient was prepped and draped in a supine position. Appropriate antibiotics were given intravenously. Arms were out.    Scalpel was used to make an incision over the hernia itself.  The skin was very redundant in this area where the hernia had popped through the hernia defect.  The hernia sac was easily divided to allow access to the hernia.  The hernia appeared to be 2 cm in diameter.  There was no contents within the hernia as they had previously been reduced.  The hernia sac was then completely dissected free from the fascial edges.  These were debrided to allow healthy fascial edges to approximate.  0 PDS sutures were used to approximate  the fascial edges in a running fashion.  Once this was done the hernia sac was removed from the subcutaneous tissues.  The hernia sac was very large and involved a portion of the umbilicus.  A portion of skin was removed with the hernia sac as it was unable to be .  This include the umbilicus and umbilical stalk.  Once this was completed, the skin was tethered to the fascia to recreate the umbilicus and the rest of the skin was closed with Monocryl sutures.  Dressings were placed.  Patient tolerated procedure well    Instrument, sponge, and needle counts were correct prior to wound closure and at the conclusion of the case.     Findings:  umbilical hernia 2 cm    Estimated Blood Loss: 20.0 cc    Drains: none    Total IV Fluids: see anesthesia    Specimens: hernia sack with skin    Implants: none    Complications:  None; patient tolerated the procedure well.    Disposition: PACU - hemodynamically stable.    Condition: stable    Attending Attestation: I was present and scrubbed for the entire procedure.

## 2023-12-07 NOTE — NURSING
Nurses Note -- 4 Eyes      12/7/2023   6:50 AM      Skin assessed during: Admit      [x] No Altered Skin Integrity Present    []Prevention Measures Documented      [] Yes- Altered Skin Integrity Present or Discovered   [] LDA Added if Not in Epic (Describe Wound)   [] New Altered Skin Integrity was Present on Admit and Documented in LDA   [] Wound Image Taken    Wound Care Consulted? No    Attending Nurse:  Rocio Castillo RN/Staff Member:   TRESA Marinelli

## 2023-12-07 NOTE — ASSESSMENT & PLAN NOTE
Patient's anemia is currently controlled. Has not received any PRBCs to date. Etiology likely d/t Iron deficiency  Current CBC reviewed-   Lab Results   Component Value Date    HGB 8.5 (L) 12/06/2023    HCT 28.4 (L) 12/06/2023     Monitor serial CBC and transfuse if patient becomes hemodynamically unstable, symptomatic or H/H drops below 7/21.

## 2023-12-07 NOTE — ANESTHESIA PROCEDURE NOTES
Intubation    Date/Time: 12/7/2023 10:20 AM    Performed by: Alexus Tomas CRNA  Authorized by: Emery Schaeffer Jr., MD    Intubation:     Induction:  Intravenous    Intubated:  Postinduction    Mask Ventilation:  Easy mask    Attempts:  1    Attempted By:  CRNA    Method of Intubation:  Video laryngoscopy    Blade:  Goff 3    Laryngeal View Grade: Grade I - full view of cords      Difficult Airway Encountered?: No      Complications:  None    Airway Device:  Oral endotracheal tube    Airway Device Size:  7.5    Style/Cuff Inflation:  Cuffed    Secured at:  The lips    Placement Verified By:  Capnometry    Complicating Factors:  None    Findings Post-Intubation:  BS equal bilateral and atraumatic/condition of teeth unchanged

## 2023-12-07 NOTE — PLAN OF CARE
Problem: Adult Inpatient Plan of Care  Goal: Plan of Care Review  Outcome: Ongoing, Progressing  Goal: Patient-Specific Goal (Individualized)  Outcome: Ongoing, Progressing  Goal: Absence of Hospital-Acquired Illness or Injury  Outcome: Ongoing, Progressing  Goal: Optimal Comfort and Wellbeing  Outcome: Ongoing, Progressing  Goal: Readiness for Transition of Care  Outcome: Ongoing, Progressing     Problem: Adjustment to Illness (Sepsis/Septic Shock)  Goal: Optimal Coping  Outcome: Ongoing, Progressing     Problem: Bleeding (Sepsis/Septic Shock)  Goal: Absence of Bleeding  Outcome: Ongoing, Progressing     Problem: Glycemic Control Impaired (Sepsis/Septic Shock)  Goal: Blood Glucose Level Within Desired Range  Outcome: Ongoing, Progressing     Problem: Infection Progression (Sepsis/Septic Shock)  Goal: Absence of Infection Signs and Symptoms  Outcome: Ongoing, Progressing     Problem: Nutrition Impaired (Sepsis/Septic Shock)  Goal: Optimal Nutrition Intake  Outcome: Ongoing, Progressing     Problem: Fluid and Electrolyte Imbalance (Acute Kidney Injury/Impairment)  Goal: Fluid and Electrolyte Balance  Outcome: Ongoing, Progressing     Problem: Oral Intake Inadequate (Acute Kidney Injury/Impairment)  Goal: Optimal Nutrition Intake  Outcome: Ongoing, Progressing     Problem: Renal Function Impairment (Acute Kidney Injury/Impairment)  Goal: Effective Renal Function  Outcome: Ongoing, Progressing     Problem: Skin Injury Risk Increased  Goal: Skin Health and Integrity  Outcome: Ongoing, Progressing     Problem: Renal Function Impairment (Acute Kidney Injury/Impairment)  Goal: Effective Renal Function  Outcome: Ongoing, Progressing     Problem: Skin Injury Risk Increased  Goal: Skin Health and Integrity  Outcome: Ongoing, Progressing

## 2023-12-07 NOTE — PROGRESS NOTES
[facility] Medicine  Progress Note    Patient Name: Froilan Ray  MRN: [id number]  Patient Class: IP- Inpatient   Admission Date: 12/6/2023  Length of Stay: 0 days  Attending Physician: Marie De La Garza MD  Primary Care Provider: Alphonse Boothe III, MD        Subjective:     Principal Problem:Strangulated hernia of abdominal wall        HPI:  78-year-old female with history of hypertension, iron deficiency anemia, atrial fibrillation, on Eliquis( was not taking ), spinal stenosis, peripheral neuropathy, seizure disorder, umbilical hernia.came with abdominal pain at the site of previous hernia .  She states she could not get it to be reduced and came with EMS . But on exam , easily reducible and minimal pain at exam . Patient also prefer to do elective surgery but family wants to do it now if possible .  CT abdomen ordered and will need to decide upon the result. Surgeon was called  in ER.   Patient was given 100 mcg of fentanyl prior to arrival and may be causing less pain at this time .      Overview/Hospital Course:  No notes on file    Interval History:  Patient seen examined.  No acute events since admission.  Reports her abdominal pain is currently resolved after her hernia was reduced in the ED. she is currently awaiting surgical intervention.    Review of Systems   Gastrointestinal:  Positive for abdominal pain.   All other systems reviewed and are negative.    Objective:     Vital Signs (Most Recent):  Temp: 97.8 °F (36.6 °C) (12/07/23 1200)  Pulse: 67 (12/07/23 1245)  Resp: 17 (12/07/23 1245)  BP: 128/67 (12/07/23 1245)  SpO2: 98 % (12/07/23 1245) Vital Signs (24h Range):  Temp:  [97.1 °F (36.2 °C)-98.7 °F (37.1 °C)] 97.8 °F (36.6 °C)  Pulse:  [63-99] 67  Resp:  [17-35] 17  SpO2:  [95 %-100 %] 98 %  BP: (128-200)/(66-91) 128/67     Weight: 68.7 kg (151 lb 6.4 oz)  Body mass index is 24.44 kg/m².    Intake/Output Summary (Last 24 hours) at 12/7/2023 1348  Last data filed at  12/7/2023 1247  Gross per 24 hour   Intake 400 ml   Output --   Net 400 ml         Physical Exam  Constitutional:       General: She is not in acute distress.     Appearance: She is not ill-appearing, toxic-appearing or diaphoretic.   Cardiovascular:      Rate and Rhythm: Normal rate and regular rhythm.      Pulses: Normal pulses.      Heart sounds: Normal heart sounds.   Pulmonary:      Effort: Pulmonary effort is normal.      Breath sounds: Normal breath sounds.   Abdominal:      General: Bowel sounds are normal. There is no distension.      Palpations: Abdomen is soft.      Tenderness: There is no abdominal tenderness. There is no guarding.   Skin:     General: Skin is warm and dry.      Coloration: Skin is not pale.   Neurological:      Mental Status: She is alert and oriented to person, place, and time. Mental status is at baseline.             Significant Labs: All pertinent labs within the past 24 hours have been reviewed.  CBC:   Recent Labs   Lab 12/06/23  1500 12/06/23  1655   WBC 6.83 6.13   HGB 8.8* 8.5*   HCT 30.9* 28.4*   * 471*     CMP:   Recent Labs   Lab 12/06/23  1500 12/07/23  0351    137   K 4.2 3.8    105   CO2 26 22*   * 107   BUN 13 10   CREATININE 0.6 0.5   CALCIUM 9.1 9.4   PROT 6.7  --    ALBUMIN 3.6  --    BILITOT 0.2  --    ALKPHOS 110  --    AST 14  --    ALT 8*  --    ANIONGAP 7* 10       Significant Imaging: I have reviewed all pertinent imaging results/findings within the past 24 hours.    Assessment/Plan:      * Strangulated hernia of abdominal wall  Consult   Keep npo  Pain control      Atrial fibrillation  Patient with Paroxysmal (<7 days) atrial fibrillation which is controlled currently with Amiodarone. Patient is currently in sinus rhythm.[id number] Score: 3. Anticoagulation indicated. Anticoagulation done with not currently taking Apixiban .    Iron deficiency anemia  Patient's anemia is currently controlled. Has not received any PRBCs to date.  Etiology likely d/t Iron deficiency  Current CBC reviewed-   Lab Results   Component Value Date    HGB 8.5 (L) 12/06/2023    HCT 28.4 (L) 12/06/2023     Monitor serial CBC and transfuse if patient becomes hemodynamically unstable, symptomatic or H/H drops below 7/21.    Essential hypertension  Chronic, controlled. Latest blood pressure and vitals reviewed-     Temp:  [97.1 °F (36.2 °C)-98.7 °F (37.1 °C)]   Pulse:  [63-99]   Resp:  [17-35]   BP: (128-200)/(66-91)   SpO2:  [95 %-100 %] .   Home meds for hypertension were reviewed and noted below.   Hypertension Medications               amlodipine-benazepril 5-20 mg (LOTREL) 5-20 mg per capsule Take 1 capsule by mouth once daily.    furosemide (LASIX) 40 MG tablet TAKE 1 TABLET(40 MG) BY MOUTH DAILY AS NEEDED FOR SWELLING    metoprolol tartrate (LOPRESSOR) 50 MG tablet Take 1 tablet (50 mg total) by mouth 3 (three) times daily.            While in the hospital, will manage blood pressure as follows; Continue home antihypertensive regimen    Will utilize p.r.n. blood pressure medication only if patient's blood pressure greater than 180/110 and she develops symptoms such as worsening chest pain or shortness of breath.      VTE Risk Mitigation (From admission, onward)           Ordered     IP VTE HIGH RISK PATIENT  Once         12/06/23 1535     Place sequential compression device  Until discontinued         12/06/23 1535                    Discharge Planning   BAILEY: 12/8/2023     Code Status: Full Code   Is the patient medically ready for discharge?:     Reason for patient still in hospital (select all that apply): Patient trending condition, Treatment, and Consult recommendations                     Renuka Castillo NP  Department of Hospital Medicine   [facility]

## 2023-12-07 NOTE — PLAN OF CARE
[facility]  Initial Discharge Assessment       Primary Care Provider: Alphonse Boothe III, MD    Admission Diagnosis: Abdominal pain, unspecified abdominal location [R10.9]    Admission Date: 12/6/2023  Expected Discharge Date: 12/8/2023    Transition of Care Barriers: None    Payor: [employer] MANAGED MEDICARE / Plan: [employer] MEDICARE PLAN PPO / Product Type: Medicare Advantage /     Extended Emergency Contact Information  Primary Emergency Contact: Otoniel Ray  Address: [address]            [place], [place] [address] [place] [address]  Home Phone: [phone]  Mobile Phone: [phone]  Relation: Spouse  Secondary Emergency Contact: SAHRA MCGUIRE  Mobile Phone: [phone]  Relation: Son    Discharge Plan A: Home Health  Discharge Plan B: Home with family      [id number] - [place], [place] - [address] [facility] [address]  [address]  [facility] [address]  Phone: [phone] Fax: [phone]      DC assessment completed with patient and son at bedside. Verified information on facesheet as correct. Denies POA. Lives at listed address with spouse and son. Verified PCP as Dr. Boothe; reports last apt was about a month ago. Pharmacy for DC is [facility] [address]. Denies hh/hd/blood thinners/outpt services. DME- rollator, rw, cane, sc, bsc, bp monitor, wc. Modified independent- uses cane or rw all the time. Does not drive. Spouse or son provide transportation to apts and one of them will provide transportation home upon DC. Denies recent inpt stay in last 30days. Reports taking home medications as prescribed and can currently afford them. Requesting [facility] upon DC.     Initial Assessment (most recent)       Adult Discharge Assessment - 12/07/23 1942          Discharge Assessment    Assessment Type Discharge Planning Assessment     Confirmed/corrected address, phone number and insurance Yes     Confirmed Demographics Correct on Facesheet     Source of  Information patient;family     Communicated BAILEY with patient/caregiver Yes     Reason For Admission strangulated hernia     People in Home spouse;child(shavon), adult     Facility Arrived From: ed     Do you expect to return to your current living situation? Yes     Do you have help at home or someone to help you manage your care at home? Yes     Prior to hospitilization cognitive status: Alert/Oriented     Current cognitive status: Alert/Oriented     Equipment Currently Used at Home walker, rolling;rollator;cane, straight;bedside commode;shower chair;blood pressure machine     Readmission within 30 days? No     Patient currently being followed by outpatient case management? No     Do you currently have service(s) that help you manage your care at home? No     Do you take prescription medications? Yes     Do you have prescription coverage? Yes     Do you have any problems affording any of your prescribed medications? No     Is the patient taking medications as prescribed? yes     Who is going to help you get home at discharge? son or spouse     How do you get to doctors appointments? family or friend will provide     Are you on dialysis? No     Do you take coumadin? No     Discharge Plan A Home Health     Discharge Plan B Home with family     DME Needed Upon Discharge  none     Discharge Plan discussed with: Patient;Adult children     Transition of Care Barriers None

## 2023-12-07 NOTE — SUBJECTIVE & OBJECTIVE
Interval History:  Patient seen examined.  No acute events since admission.  Reports her abdominal pain is currently resolved after her hernia was reduced in the ED. she is currently awaiting surgical intervention.    Review of Systems   Gastrointestinal:  Positive for abdominal pain.   All other systems reviewed and are negative.    Objective:     Vital Signs (Most Recent):  Temp: 97.8 °F (36.6 °C) (12/07/23 1200)  Pulse: 67 (12/07/23 1245)  Resp: 17 (12/07/23 1245)  BP: 128/67 (12/07/23 1245)  SpO2: 98 % (12/07/23 1245) Vital Signs (24h Range):  Temp:  [97.1 °F (36.2 °C)-98.7 °F (37.1 °C)] 97.8 °F (36.6 °C)  Pulse:  [63-99] 67  Resp:  [17-35] 17  SpO2:  [95 %-100 %] 98 %  BP: (128-200)/(66-91) 128/67     Weight: 68.7 kg (151 lb 6.4 oz)  Body mass index is 24.44 kg/m².    Intake/Output Summary (Last 24 hours) at 12/7/2023 1348  Last data filed at 12/7/2023 1247  Gross per 24 hour   Intake 400 ml   Output --   Net 400 ml         Physical Exam  Constitutional:       General: She is not in acute distress.     Appearance: She is not ill-appearing, toxic-appearing or diaphoretic.   Cardiovascular:      Rate and Rhythm: Normal rate and regular rhythm.      Pulses: Normal pulses.      Heart sounds: Normal heart sounds.   Pulmonary:      Effort: Pulmonary effort is normal.      Breath sounds: Normal breath sounds.   Abdominal:      General: Bowel sounds are normal. There is no distension.      Palpations: Abdomen is soft.      Tenderness: There is no abdominal tenderness. There is no guarding.   Skin:     General: Skin is warm and dry.      Coloration: Skin is not pale.   Neurological:      Mental Status: She is alert and oriented to person, place, and time. Mental status is at baseline.             Significant Labs: All pertinent labs within the past 24 hours have been reviewed.  CBC:   Recent Labs   Lab 12/06/23  1500 12/06/23  1655   WBC 6.83 6.13   HGB 8.8* 8.5*   HCT 30.9* 28.4*   * 471*     CMP:   Recent Labs    Lab 12/06/23  1500 12/07/23  0351    137   K 4.2 3.8    105   CO2 26 22*   * 107   BUN 13 10   CREATININE 0.6 0.5   CALCIUM 9.1 9.4   PROT 6.7  --    ALBUMIN 3.6  --    BILITOT 0.2  --    ALKPHOS 110  --    AST 14  --    ALT 8*  --    ANIONGAP 7* 10       Significant Imaging: I have reviewed all pertinent imaging results/findings within the past 24 hours.

## 2023-12-07 NOTE — ASSESSMENT & PLAN NOTE
Patient with Paroxysmal (<7 days) atrial fibrillation which is controlled currently with Amiodarone. Patient is currently in sinus rhythm.[id number] Score: 3. Anticoagulation indicated. Anticoagulation done with not currently taking Apixiban .

## 2023-12-07 NOTE — ANESTHESIA PREPROCEDURE EVALUATION
12/07/2023  Froilan Ray is a 78 y.o., female.      Pre-op Assessment    I have reviewed the Patient Summary Reports.     I have reviewed the Nursing Notes. I have reviewed the NPO Status.   I have reviewed the Medications.     Review of Systems  Anesthesia Hx:  No problems with previous Anesthesia   Neg history of prior surgery.          Denies Family Hx of Anesthesia complications.    Denies Personal Hx of Anesthesia complications.                    Hematology/Oncology:    Oncology Normal    -- Anemia:               Hematology Comments: Hx of transfusion                    EENT/Dental:  EENT/Dental Normal           Cardiovascular:     Hypertension                                        Pulmonary:  Pulmonary Normal                       Renal/:  Chronic Renal Disease, ARF                Hepatic/GI:   PUD,               Musculoskeletal:  Arthritis          Spine Disorders: lumbar Degenerative disease, Disc disease and Chronic Pain           Neurological:       Seizures          Peripheral Neuropathy (Feet)                          Endocrine:  Endocrine Normal            Dermatological:  Skin Normal    Psych:    depression              Patient Active Problem List   Diagnosis    Chronic pain    Encounter for postoperative wound check    Uterine mass    Closed nondisplaced intertrochanteric fracture of left femur    Essential hypertension    Peripheral neuropathy    Seizure disorder    Open wound of left heel    Colitis    ACP (advance care planning)    Drug-induced constipation    Disease of biliary tract    Severe anemia    Chronic gastric ulcer with bleeding    Iron deficiency anemia    Sepsis    PEDRO (acute kidney injury)    Atrial fibrillation    Hypokalemia    Spinal stenosis    Uses walker    History of seizures    Anticoagulated    S/P ORIF (open reduction internal fixation) fracture     Osteoporosis    HFrEF (heart failure with reduced ejection fraction)    Depression    Closed fracture of acetabulum    Kyphoscoliosis and scoliosis    Strangulated hernia of abdominal wall       Past Surgical History:   Procedure Laterality Date    BACK SURGERY      BREAST BIOPSY      BREAST SURGERY Right     lumpectomy    CAUDAL EPIDURAL STEROID INJECTION N/A 01/28/2022    Procedure: Injection-steroid-epidural-caudal;  Surgeon: Luzmaria Marcelino MD;  Location: [facility] OR;  Service: Pain Management;  Laterality: N/A;    COLONOSCOPY N/A 01/14/2022    Procedure: COLONOSCOPY;  Surgeon: Abby Landers MD;  Location: [facility];  Service: Endoscopy;  Laterality: N/A;    ENDOSCOPIC ULTRASOUND OF UPPER GASTROINTESTINAL TRACT N/A 07/21/2020    Procedure: ULTRASOUND, UPPER GI TRACT, ENDOSCOPIC;  Surgeon: Marcio Nguyễn III, MD;  Location: [facility] ENDO;  Service: Endoscopy;  Laterality: N/A;    ESOPHAGOGASTRODUODENOSCOPY N/A 01/14/2022    Procedure: EGD (ESOPHAGOGASTRODUODENOSCOPY);  Surgeon: Abby Landers MD;  Location: [facility];  Service: Endoscopy;  Laterality: N/A;    ESOPHAGOGASTRODUODENOSCOPY N/A 06/10/2022    Procedure: EGD (ESOPHAGOGASTRODUODENOSCOPY);  Surgeon: Abby Landers MD;  Location: [facility] ENDO;  Service: Endoscopy;  Laterality: N/A;    EYE SURGERY      cataract    HYSTERECTOMY      INTRAMEDULLARY RODDING OF TROCHANTER OF FEMUR Left 12/11/2018    Procedure: INSERTION, INTRAMEDULLARY SANTOS, FEMUR, TROCHANTER;  Surgeon: Eulalio De La Cruz MD;  Location: [facility];  Service: Orthopedics;  Laterality: Left;    SPINAL CORD STIMULATOR IMPLANT  09/18/2013    and removal    SPINE SURGERY  2006    lumbar L2-S1 decompression.    SPINE SURGERY      cervical decompression    TONSILLECTOMY          Tobacco Use:  The patient  reports that she has never smoked. She has never used smokeless tobacco.     Results for orders placed or performed during the hospital encounter of 12/06/23   EKG 12-lead    Collection Time:  12/06/23  2:49 PM    Narrative    Test Reason : R07.9,    Vent. Rate : 093 BPM     Atrial Rate : 093 BPM     P-R Int : 156 ms          QRS Dur : 078 ms      QT Int : 350 ms       P-R-T Axes : 055 017 076 degrees     QTc Int : 435 ms    Normal sinus rhythm  Normal ECG  When compared with ECG of 18-SEP-2023 09:40,  No significant change was found    Referred By: [email or web address]   SELF           Confirmed By:         Imaging Results              CT Abdomen Pelvis With IV Contrast NO Oral Contrast (Final result)  Result time 12/06/23 16:51:07      Final result by Richard Bernard MD (12/06/23 16:51:07)                   Narrative:    CMS MANDATED QUALITY DATA - CT RADIATION - 436    One or more of the following dose-optimizing techniques was utilized for this exam: automated exposure control, adjustment of the mA and/or kV according to patient size, and/or use of iterative reconstruction technique.      HISTORY:Suspected incarcerated umbilical hernia is the history provided.    TECHNIQUE: Serial axial images were obtained from the domes of the diaphragm to the pubic symphysis with 100ccs Omnipaque 350 intravenous contrast. Oral contrast was not administered for this exam. Coronal and sagittal reconstructions were performed. Patient received 478 mGy-cm of radiation exposure from this exam.    COMPARISON: Comparison to previous study dated November 30, 2022.    FINDINGS:  Lung bases: Lung bases are free of infiltrate or pleural effusion.    Liver: Liver appears stable in size and contour. Gallbladder appears nondistended. Several scattered small subcentimeter hepatic hypodensities are present most consistent with small hepatic cysts.    Pancreas: Pancreas is atrophic.    Spleen: Normal.    Adrenal glands: Mild adrenal gland thickening is present bilaterally with stable interval appearance.    Kidneys and ureters: Bilateral functioning kidneys are noted. No hydronephrosis is seen. Nonobstructing 4 mm left lower pole intrarenal  calculi is again noted. Ureters appear nondilated.    Bladder: Bladder is unremarkable.    Bowel: Small hiatal hernia is present. Stomach appears nondistended. Fluid density is visualized in nondistended small bowel loops. Moderate volume of colonic fecal material is present in the ace ascending and transverse colon. No evidence of bowel obstruction is seen.    Peritoneum: Edema of the mesentery is visualized in the anterior peritoneal cavity beneath umbilicus. This edema may be secondary to hernia which has been recently reduced. Is there a history of reduction of the umbilical hernia.    Retroperitoneum: Unremarkable.    Lymph nodes: No evidence of abdominal or pelvic adenopathy is seen.    Abdominal wall: Small umbilical hernia is present with thickening of the skin in the region the umbilicus which may be secondary to recent reduction of umbilical hernia.    Bones: Postop changes of the left femur present. Severe degenerative changes of the lumbar spine are noted with levoscoliosis.    IMPRESSION:  1. Small fat-containing umbilical hernia with periumbilical skin thickening and mesenteric edema in the central abdomen beneath the site of the umbilical hernia. These imaging findings may be secondary to recent reduction of bowel containing umbilical hernia. No evidence of bowel obstruction or perforation is seen.        Electronically signed by:  Richard Bernard MD  12/06/2023 04:51 PM CST Workstation: [id number]                                     X-Ray Chest AP Portable (Final result)  Result time 12/06/23 15:00:21      Final result by Ronan Neely MD (12/06/23 15:00:21)                   Narrative:    Reason: Chest Pain    FINDINGS:    Portable chest with comparison chest x-ray September 18, 2023 show normal cardiomediastinal silhouette.  Lungs are clear. Pulmonary vasculature is normal. No acute osseous abnormality.    IMPRESSION:    No acute cardiopulmonary abnormality.    Electronically signed by:  Ronan  Florinda DO  12/06/2023 03:00 PM CST Workstation: [id number]                                     Lab Results   Component Value Date    WBC 6.13 12/06/2023    HGB 8.5 (L) 12/06/2023    HCT 28.4 (L) 12/06/2023    MCV 78 (L) 12/06/2023     (H) 12/06/2023     BMP  Lab Results   Component Value Date     12/07/2023    K 3.8 12/07/2023     12/07/2023    CO2 22 (L) 12/07/2023    BUN 10 12/07/2023    CREATININE 0.5 12/07/2023    CALCIUM 9.4 12/07/2023    ANIONGAP 10 12/07/2023     12/07/2023     (H) 12/06/2023     (H) 09/18/2023       Results for orders placed during the hospital encounter of 06/14/22    Echo    Interpretation Summary  · The left ventricle is normal in size with concentric remodeling and normal systolic function.  · Moderate left atrial enlargement.  · The estimated ejection fraction is 50%.  · Indeterminate left ventricular diastolic function with normal left atrial pressure.  · Normal right ventricular size with normal right ventricular systolic function.  · Mild mitral regurgitation.  · Normal central venous pressure (3 mmHg).  · The estimated PA systolic pressure is 27 mmHg.           Physical Exam  General: Well nourished and Alert    Airway:  Mallampati: II   Mouth Opening: Normal  TM Distance: Normal  Tongue: Normal  Neck ROM: Normal ROM    Dental:  Intact    Chest/Lungs:  Clear to auscultation, Normal Respiratory Rate    Heart:  Rate: Normal  Rhythm: Regular Rhythm  Sounds: Normal        Anesthesia Plan  Type of Anesthesia, risks & benefits discussed:    Anesthesia Type: Gen ETT  Intra-op Monitoring Plan: Standard ASA Monitors  Post Op Pain Control Plan: multimodal analgesia  Induction:  IV  Airway Plan: Video, Post-Induction  Informed Consent: Informed consent signed with the Patient and all parties understand the risks and agree with anesthesia plan.  All questions answered. Patient consented to blood products? Yes  ASA Score: 3  Anesthesia Plan Notes: Tylenol  1 gn, Precedex  Zofran 4 mg, Benadryl 6.25 mg, Pepcid 20 mg    Ready For Surgery From Anesthesia Perspective.     .

## 2023-12-07 NOTE — CONSULTS
[facility]  General Surgery  Consult Note    Inpatient consult to General surgery  Consult performed by: Ainsley Cloud MD  Consult ordered by: Pierre Floyd MD  Reason for consult: Umbilical hernia  Assessment/Recommendations: 77yo F w/PMH HTN, iron deficiency anemia, afib (not on eliquis), spinal stenosis, peripheral neuropathy, seizure disorder who is admitted to hospital medicine for umbilical hernia    - hernia easily reducible at bedside, patient with no obstructive symptoms; this is her first hospitalization for the hernia  - to OR today for umbilical hernia repair, consent will be obtained  - please keep patient NPO  - please call general surgery with changes in abdominal exam        Subjective:     Chief Complaint/Reason for Admission: Umbilical hernia    History of Present Illness: Ms. Ray is a 77yo F w/PMH HTN, iron deficiency anemia, afib (not on eliquis), spinal stenosis, peripheral neuropathy, seizure disorder who is admitted to hospital medicine for umbilical hernia. Patient states that yesterday her hernia became hard and painful. She has had intermittent episodes like this in the past but not as painful. When the hernia did not go back in, she came to the ED for evaluation however, as she was coming to the ED her pain resolved and her hernia reduced. She denies fevers, chills, nausea, vomiting, abdominal pain, changes to bowel or bladder habits. Last BM was on Monday which is normal for her, she is passing gas. She has never been hospitalized for this hernia in the past.    No history of MI or stroke, patient walks with walker due to L sciatica making walking painful. She is supposed to be on eliquis for afib but has not been taking it.    PSH: transvaginal hysterectomy, multiple back surgeries including cervical spinal fusion via anterior approach  Social: never smoker, denies EtOH and drug use    No current facility-administered medications on file prior to encounter.      Current Outpatient Medications on File Prior to Encounter   Medication Sig    acetaminophen (TYLENOL) 325 MG tablet Take 650 mg by mouth every 8 (eight) hours as needed for Pain.    amlodipine-benazepril 5-20 mg (LOTREL) 5-20 mg per capsule Take 1 capsule by mouth once daily.    cyclobenzaprine (FLEXERIL) 10 MG tablet TAKE 1 TABLET(10 MG) BY MOUTH THREE TIMES DAILY AS NEEDED FOR MUSCLE SPASMS (Patient taking differently: Take 10 mg by mouth 3 (three) times daily as needed for Muscle spasms.)    docusate sodium (COLACE) 100 MG capsule Take 1 capsule (100 mg total) by mouth 2 (two) times daily.    EScitalopram oxalate (LEXAPRO) 20 MG tablet TAKE 1 TABLET(20 MG) BY MOUTH EVERY DAY (Patient taking differently: Take 20 mg by mouth every evening.)    omeprazole (PRILOSEC) 40 MG capsule TAKE 1 CAPSULE(40 MG) BY MOUTH EVERY MORNING (Patient taking differently: Take 40 mg by mouth every morning.)    oxyCODONE-acetaminophen (PERCOCET)  mg per tablet Take 1 tablet by mouth every 4 to 6 hours as needed for Pain.    pregabalin (LYRICA) 200 MG Cap TAKE 1 CAPSULE(200 MG) BY MOUTH THREE TIMES DAILY (Patient taking differently: Take 200 mg by mouth 3 (three) times daily. TAKE 1 CAPSULE(200 MG) BY MOUTH THREE TIMES DAILY)    traMADoL (ULTRAM) 50 mg tablet Take 2 tablets by mouth every 12 (twelve) hours as needed for Pain.    amiodarone (PACERONE) 200 MG Tab Take 1 tablet (200 mg total) by mouth once daily. (Patient not taking: Reported on 12/6/2023)    apixaban (ELIQUIS) 2.5 mg Tab Take 1 tablet (2.5 mg total) by mouth 2 (two) times daily. (Patient not taking: Reported on 12/6/2023)    furosemide (LASIX) 40 MG tablet TAKE 1 TABLET(40 MG) BY MOUTH DAILY AS NEEDED FOR SWELLING (Patient taking differently: Take 40 mg by mouth daily as needed (leg swelling).)    metoprolol tartrate (LOPRESSOR) 50 MG tablet Take 1 tablet (50 mg total) by mouth 3 (three) times daily. (Patient not taking: Reported on 12/6/2023)    [DISCONTINUED]  pantoprazole (PROTONIX) 40 MG tablet Take 1 tablet (40 mg total) by mouth 2 (two) times daily.       Review of patient's allergies indicates:  No Known Allergies    Past Medical History:   Diagnosis Date    Anemia     Arthritis     Bleeding ulcer 07/2016    Chronic pain     DDD (degenerative disc disease), cervical     DDD (degenerative disc disease), lumbar     Depression     Disc degeneration, lumbosacral     Diverticulitis     Encounter for blood transfusion     Hypertension     IBS (irritable bowel syndrome)     Neuropathy of both feet     Seizures     none  since 2017    Umbilical hernia 2020     Past Surgical History:   Procedure Laterality Date    BACK SURGERY      BREAST BIOPSY      BREAST SURGERY Right     lumpectomy    CAUDAL EPIDURAL STEROID INJECTION N/A 01/28/2022    Procedure: Injection-steroid-epidural-caudal;  Surgeon: Luzmaria Marcelino MD;  Location: [facility] OR;  Service: Pain Management;  Laterality: N/A;    COLONOSCOPY N/A 01/14/2022    Procedure: COLONOSCOPY;  Surgeon: Abby Landers MD;  Location: [facility];  Service: Endoscopy;  Laterality: N/A;    ENDOSCOPIC ULTRASOUND OF UPPER GASTROINTESTINAL TRACT N/A 07/21/2020    Procedure: ULTRASOUND, UPPER GI TRACT, ENDOSCOPIC;  Surgeon: Marcio Nguyễn III, MD;  Location: [facility];  Service: Endoscopy;  Laterality: N/A;    ESOPHAGOGASTRODUODENOSCOPY N/A 01/14/2022    Procedure: EGD (ESOPHAGOGASTRODUODENOSCOPY);  Surgeon: Abby Landers MD;  Location: [facility];  Service: Endoscopy;  Laterality: N/A;    ESOPHAGOGASTRODUODENOSCOPY N/A 06/10/2022    Procedure: EGD (ESOPHAGOGASTRODUODENOSCOPY);  Surgeon: Abby Landers MD;  Location: [facility];  Service: Endoscopy;  Laterality: N/A;    EYE SURGERY      cataract    HYSTERECTOMY      INTRAMEDULLARY RODDING OF TROCHANTER OF FEMUR Left 12/11/2018    Procedure: INSERTION, INTRAMEDULLARY SANTOS, FEMUR, TROCHANTER;  Surgeon: Eulalio De La Cruz MD;  Location: [facility];  Service: Orthopedics;  Laterality:  Left;    SPINAL CORD STIMULATOR IMPLANT  09/18/2013    and removal    SPINE SURGERY  2006    lumbar L2-S1 decompression.    SPINE SURGERY      cervical decompression    TONSILLECTOMY       Family History       Problem Relation (Age of Onset)    COPD Brother    Coronary artery disease Father    Depression Father    Diabetes Mother, Father, Sister, Brother    Heart disease Father    Hypertension Mother, Father    Irritable bowel syndrome Mother          Tobacco Use    Smoking status: Never    Smokeless tobacco: Never   Substance and Sexual Activity    Alcohol use: No    Drug use: No    Sexual activity: Not Currently     Review of Systems   Constitutional:  Negative for activity change, appetite change, chills and fever.   Respiratory:  Negative for chest tightness and shortness of breath.    Cardiovascular:  Negative for chest pain.   Gastrointestinal:  Negative for abdominal distention, abdominal pain, constipation, diarrhea, nausea and vomiting.   Genitourinary:  Negative for dysuria and hematuria.   Musculoskeletal:  Positive for arthralgias and back pain.   Neurological:  Negative for dizziness and headaches.     Objective:     Vital Signs (Most Recent):  Temp: 98.7 °F (37.1 °C) (12/07/23 0734)  Pulse: 72 (12/07/23 0734)  Resp: 19 (12/07/23 0821)  BP: (!) 176/82 (12/07/23 0734)  SpO2: 96 % (12/07/23 0734) Vital Signs (24h Range):  Temp:  [97.1 °F (36.2 °C)-98.7 °F (37.1 °C)] 98.7 °F (37.1 °C)  Pulse:  [68-99] 72  Resp:  [18-35] 19  SpO2:  [95 %-100 %] 96 %  BP: (140-200)/(69-91) 176/82     Weight: 68.7 kg (151 lb 6.4 oz)  Body mass index is 24.44 kg/m².      Intake/Output Summary (Last 24 hours) at 12/7/2023 0811  Last data filed at 12/7/2023 0733  Gross per 24 hour   Intake 0 ml   Output --   Net 0 ml       Physical Exam  Constitutional:       Appearance: Normal appearance.   HENT:      Head: Normocephalic and atraumatic.   Pulmonary:      Effort: Pulmonary effort is normal.   Abdominal:      General: There is no  distension.      Palpations: Abdomen is soft.      Comments: Very mild tenderness to palpation in the RLQ. Umbilical hernia soft, non-tender with no overlying skin changes, easily reducible; ~2cm defect palpable.   Skin:     General: Skin is warm and dry.   Neurological:      General: No focal deficit present.      Mental Status: She is alert.         Significant Labs:  BMP:   Recent Labs   Lab 12/06/23  1500 12/07/23  0351   * 107    137   K 4.2 3.8    105   CO2 26 22*   BUN 13 10   CREATININE 0.6 0.5   CALCIUM 9.1 9.4   MG 1.8  --      CBC:   Recent Labs   Lab 12/06/23  1655   WBC 6.13   RBC 3.63*   HGB 8.5*   HCT 28.4*   *   MCV 78*   MCH 23.4*   MCHC 29.9*       Significant Diagnostics:  CT: I have reviewed all pertinent results/findings within the past 24 hours. Fat containing umbilical hernia without obstruction or strangulation    Assessment/Plan:   77yo F w/PMH HTN, iron deficiency anemia, afib (not on eliquis), spinal stenosis, peripheral neuropathy, seizure disorder who is admitted to hospital medicine for umbilical hernia    - hernia easily reducible at bedside, patient with no obstructive symptoms; this is her first hospitalization for the hernia  - to OR today for umbilical hernia repair, consent will be obtained  - please keep patient NPO  - please call general surgery with changes in abdominal exam      Thank you for your consult. I will follow-up with patient. Please contact us if you have any additional questions.    Ainsley Cloud MD  General Surgery  [facility]

## 2023-12-07 NOTE — PLAN OF CARE
CM attempted to complete discharge planning assessment however the pt is currently out of the room. CM will return to follow up .    12/07/23 1022   Discharge Assessment   Assessment Type Discharge Planning Assessment

## 2023-12-07 NOTE — ANESTHESIA POSTPROCEDURE EVALUATION
Anesthesia Post Evaluation    Patient: Froilan Ray    Procedure(s) Performed: Procedure(s) (LRB):  REPAIR, HERNIA, EPIGASTRIC (N/A)    Final Anesthesia Type: general      Patient location during evaluation: PACU  Patient participation: Yes- Able to Participate  Level of consciousness: awake and alert  Post-procedure vital signs: reviewed and stable  Pain management: adequate  Airway patency: patent    PONV status at discharge: No PONV  Anesthetic complications: no      Cardiovascular status: stable  Respiratory status: unassisted and spontaneous ventilation  Hydration status: euvolemic  Follow-up not needed.              Vitals Value Taken Time   /87 12/07/23 1230   Temp 36.6 °C (97.8 °F) 12/07/23 1200   Pulse 69 12/07/23 1241   Resp 21 12/07/23 1241   SpO2 98 % 12/07/23 1241   Vitals shown include unvalidated device data.      No case tracking events are documented in the log.      Pain/Duarte Score: Pain Rating Prior to Med Admin: 6 (12/7/2023 12:30 PM)  Pain Rating Post Med Admin: 4 (12/7/2023  9:21 AM)  Duarte Score: 9 (12/7/2023 12:30 PM)

## 2023-12-07 NOTE — ASSESSMENT & PLAN NOTE
Chronic, controlled. Latest blood pressure and vitals reviewed-     Temp:  [97.1 °F (36.2 °C)-98.7 °F (37.1 °C)]   Pulse:  [63-99]   Resp:  [17-35]   BP: (128-200)/(66-91)   SpO2:  [95 %-100 %] .   Home meds for hypertension were reviewed and noted below.   Hypertension Medications               amlodipine-benazepril 5-20 mg (LOTREL) 5-20 mg per capsule Take 1 capsule by mouth once daily.    furosemide (LASIX) 40 MG tablet TAKE 1 TABLET(40 MG) BY MOUTH DAILY AS NEEDED FOR SWELLING    metoprolol tartrate (LOPRESSOR) 50 MG tablet Take 1 tablet (50 mg total) by mouth 3 (three) times daily.            While in the hospital, will manage blood pressure as follows; Continue home antihypertensive regimen    Will utilize p.r.n. blood pressure medication only if patient's blood pressure greater than 180/110 and she develops symptoms such as worsening chest pain or shortness of breath.

## 2023-12-08 VITALS
HEIGHT: 66 IN | TEMPERATURE: 98 F | BODY MASS INDEX: 24.33 KG/M2 | RESPIRATION RATE: 18 BRPM | HEART RATE: 61 BPM | OXYGEN SATURATION: 95 % | SYSTOLIC BLOOD PRESSURE: 121 MMHG | DIASTOLIC BLOOD PRESSURE: 61 MMHG | WEIGHT: 151.38 LBS

## 2023-12-08 LAB
ANION GAP SERPL CALC-SCNC: 5 MMOL/L (ref 8–16)
BUN SERPL-MCNC: 10 MG/DL (ref 8–23)
CALCIUM SERPL-MCNC: 7.5 MG/DL (ref 8.7–10.5)
CHLORIDE SERPL-SCNC: 111 MMOL/L (ref 95–110)
CO2 SERPL-SCNC: 22 MMOL/L (ref 23–29)
CREAT SERPL-MCNC: 0.6 MG/DL (ref 0.5–1.4)
EST. GFR  (NO RACE VARIABLE): >60 ML/MIN/1.73 M^2
GLUCOSE SERPL-MCNC: 95 MG/DL (ref 70–110)
POTASSIUM SERPL-SCNC: 3.5 MMOL/L (ref 3.5–5.1)
SODIUM SERPL-SCNC: 138 MMOL/L (ref 136–145)

## 2023-12-08 PROCEDURE — 25000003 PHARM REV CODE 250: Performed by: SURGERY

## 2023-12-08 PROCEDURE — 25000003 PHARM REV CODE 250: Performed by: STUDENT IN AN ORGANIZED HEALTH CARE EDUCATION/TRAINING PROGRAM

## 2023-12-08 PROCEDURE — 25000003 PHARM REV CODE 250: Performed by: INTERNAL MEDICINE

## 2023-12-08 PROCEDURE — 80048 BASIC METABOLIC PNL TOTAL CA: CPT | Performed by: INTERNAL MEDICINE

## 2023-12-08 RX ORDER — CIPROFLOXACIN 500 MG/1
500 TABLET ORAL EVERY 12 HOURS
Qty: 20 TABLET | Refills: 0 | Status: SHIPPED | OUTPATIENT
Start: 2023-12-08 | End: 2023-12-18

## 2023-12-08 RX ORDER — MUPIROCIN 20 MG/G
OINTMENT TOPICAL 2 TIMES DAILY
Status: DISCONTINUED | OUTPATIENT
Start: 2023-12-08 | End: 2023-12-08 | Stop reason: HOSPADM

## 2023-12-08 RX ORDER — CIPROFLOXACIN 500 MG/1
500 TABLET ORAL EVERY 12 HOURS
Status: DISCONTINUED | OUTPATIENT
Start: 2023-12-08 | End: 2023-12-08 | Stop reason: HOSPADM

## 2023-12-08 RX ADMIN — METOPROLOL TARTRATE 50 MG: 50 TABLET, FILM COATED ORAL at 08:12

## 2023-12-08 RX ADMIN — PREGABALIN 150 MG: 75 CAPSULE ORAL at 08:12

## 2023-12-08 RX ADMIN — CIPROFLOXACIN 500 MG: 500 TABLET, FILM COATED ORAL at 08:12

## 2023-12-08 RX ADMIN — OXYCODONE HYDROCHLORIDE AND ACETAMINOPHEN 1 TABLET: 10; 325 TABLET ORAL at 04:12

## 2023-12-08 RX ADMIN — OXYCODONE HYDROCHLORIDE AND ACETAMINOPHEN 1 TABLET: 10; 325 TABLET ORAL at 10:12

## 2023-12-08 RX ADMIN — CYCLOBENZAPRINE 10 MG: 10 TABLET, FILM COATED ORAL at 08:12

## 2023-12-08 RX ADMIN — MUPIROCIN 1 G: 20 OINTMENT TOPICAL at 08:12

## 2023-12-08 RX ADMIN — ESCITALOPRAM OXALATE 10 MG: 10 TABLET ORAL at 08:12

## 2023-12-08 RX ADMIN — AMIODARONE HYDROCHLORIDE 200 MG: 200 TABLET ORAL at 08:12

## 2023-12-08 NOTE — NURSING
Patient discharged per orders, all instructions reviewed with patient and patient verbalized understanding.  IV discontinued x 1, Tele box discontinued x 1.  Patient's dressing changed, new dressing in place, C/D/I.  Patient transferred to wheelchair x 1, all belongings by patients side.  Patient's spouse by her side and to transport her home.

## 2023-12-08 NOTE — SUBJECTIVE & OBJECTIVE
Interval History: doing well  Some pain     Medications:  Continuous Infusions:  Scheduled Meds:   amiodarone  200 mg Oral Daily    ciprofloxacin HCl  500 mg Oral Q12H    EScitalopram oxalate  10 mg Oral Daily    metoprolol tartrate  50 mg Oral TID    mupirocin   Nasal BID    pregabalin  150 mg Oral TID     PRN Meds:cyclobenzaprine, dextrose 50%, dextrose 50%, glucagon (human recombinant), glucose, glucose, naloxone, oxyCODONE-acetaminophen, sodium chloride 0.9%     Review of patient's allergies indicates:  No Known Allergies  Objective:     Vital Signs (Most Recent):  Temp: 98 °F (36.7 °C) (12/08/23 0741)  Pulse: 69 (12/08/23 0830)  Resp: 18 (12/08/23 0741)  BP: (!) 145/57 (12/08/23 0830)  SpO2: 99 % (12/08/23 0741) Vital Signs (24h Range):  Temp:  [97.1 °F (36.2 °C)-98.7 °F (37.1 °C)] 98 °F (36.7 °C)  Pulse:  [63-80] 69  Resp:  [15-24] 18  SpO2:  [96 %-100 %] 99 %  BP: (113-171)/(57-87) 145/57     Weight: 68.7 kg (151 lb 6.4 oz)  Body mass index is 24.44 kg/m².    Intake/Output - Last 3 Shifts         12/06 0700 12/07 0659 12/07 0700 12/08 0659 12/08 0700 12/09 0659    P.O.  220     I.V. (mL/kg)  200 (2.9)     IV Piggyback  100     Total Intake(mL/kg)  520 (7.6)     Net  +520            Urine Occurrence 2 x 3 x              Physical Exam  Abdominal:      General: Bowel sounds are normal. There is no distension.      Palpations: Abdomen is soft.      Tenderness: There is no abdominal tenderness.      Comments: Ss drainage on bandage          Significant Labs:  I have reviewed all pertinent lab results within the past 24 hours.    CBC:   Recent Labs   Lab 12/06/23  1655   WBC 6.13   RBC 3.63*   HGB 8.5*   HCT 28.4*   *   MCV 78*   MCH 23.4*   MCHC 29.9*     BMP:   Recent Labs   Lab 12/06/23  1500 12/07/23  0351 12/08/23  0401   *   < > 95      < > 138   K 4.2   < > 3.5      < > 111*   CO2 26   < > 22*   BUN 13   < > 10   CREATININE 0.6   < > 0.6   CALCIUM 9.1   < > 7.5*   MG 1.8  --   --      < > = values in this interval not displayed.       Significant Diagnostics:  I have reviewed all pertinent imaging results/findings within the past 24 hours.

## 2023-12-08 NOTE — HOSPITAL COURSE
Patient was admitted with strangulated hernia.  General surgery was consulted and patient was taken to the OR.  She was monitored closely during her stay.  Her pain was well controlled.  She tolerated her diet.  She was cleared from general surgery perspective for discharge.  Patient was seen and evaluated on day of discharge and deemed appropriate.  Discharge instructions as well as return precautions were discussed with patient with good understanding.  Home health was secured prior to discharge.

## 2023-12-08 NOTE — PROGRESS NOTES
[facility]  General Surgery  Progress Note    Subjective:     History of Present Illness:  No notes on file    Post-Op Info:  Procedure(s) (LRB):  REPAIR, HERNIA, EPIGASTRIC (N/A)   1 Day Post-Op     Interval History: doing well  Some pain     Medications:  Continuous Infusions:  Scheduled Meds:   amiodarone  200 mg Oral Daily    ciprofloxacin HCl  500 mg Oral Q12H    EScitalopram oxalate  10 mg Oral Daily    metoprolol tartrate  50 mg Oral TID    mupirocin   Nasal BID    pregabalin  150 mg Oral TID     PRN Meds:cyclobenzaprine, dextrose 50%, dextrose 50%, glucagon (human recombinant), glucose, glucose, naloxone, oxyCODONE-acetaminophen, sodium chloride 0.9%     Review of patient's allergies indicates:  No Known Allergies  Objective:     Vital Signs (Most Recent):  Temp: 98 °F (36.7 °C) (12/08/23 0741)  Pulse: 69 (12/08/23 0830)  Resp: 18 (12/08/23 0741)  BP: (!) 145/57 (12/08/23 0830)  SpO2: 99 % (12/08/23 0741) Vital Signs (24h Range):  Temp:  [97.1 °F (36.2 °C)-98.7 °F (37.1 °C)] 98 °F (36.7 °C)  Pulse:  [63-80] 69  Resp:  [15-24] 18  SpO2:  [96 %-100 %] 99 %  BP: (113-171)/(57-87) 145/57     Weight: 68.7 kg (151 lb 6.4 oz)  Body mass index is 24.44 kg/m².    Intake/Output - Last 3 Shifts         12/06 0700 12/07 0659 12/07 0700 12/08 0659 12/08 0700 12/09 0659    P.O.  220     I.V. (mL/kg)  200 (2.9)     IV Piggyback  100     Total Intake(mL/kg)  520 (7.6)     Net  +520            Urine Occurrence 2 x 3 x              Physical Exam  Abdominal:      General: Bowel sounds are normal. There is no distension.      Palpations: Abdomen is soft.      Tenderness: There is no abdominal tenderness.      Comments: Ss drainage on bandage          Significant Labs:  I have reviewed all pertinent lab results within the past 24 hours.    CBC:   Recent Labs   Lab 12/06/23  1655   WBC 6.13   RBC 3.63*   HGB 8.5*   HCT 28.4*   *   MCV 78*   MCH 23.4*   MCHC 29.9*     BMP:   Recent Labs   Lab  12/06/23  1500 12/07/23  0351 12/08/23  0401   *   < > 95      < > 138   K 4.2   < > 3.5      < > 111*   CO2 26   < > 22*   BUN 13   < > 10   CREATININE 0.6   < > 0.6   CALCIUM 9.1   < > 7.5*   MG 1.8  --   --     < > = values in this interval not displayed.       Significant Diagnostics:  I have reviewed all pertinent imaging results/findings within the past 24 hours.  Assessment/Plan:     * Strangulated hernia of abdominal wall  Ok to dc with antibiotics   Follow up with me in 1-2 weeks  No lifting over 30 pounds for 6 weeks        Vipul Escobar MD  General Surgery  [facility]

## 2023-12-08 NOTE — DISCHARGE SUMMARY
[facility] Medicine  Discharge Summary      Patient Name: Froilan Ray  MRN: [id number]  IVAN: [id number]  Patient Class: IP- Inpatient  Admission Date: 12/6/2023  Hospital Length of Stay: 1 days  Discharge Date and Time: 12/8/2023  2:37 PM  Attending Physician: Maksim Blum MD   Discharging Provider: Renuka Castillo NP  Primary Care Provider: Alphonse Boothe III, MD    Primary Care Team: Networked reference to record PCT     HPI:   78-year-old female with history of hypertension, iron deficiency anemia, atrial fibrillation, on Eliquis( was not taking ), spinal stenosis, peripheral neuropathy, seizure disorder, umbilical hernia.came with abdominal pain at the site of previous hernia .  She states she could not get it to be reduced and came with EMS . But on exam , easily reducible and minimal pain at exam . Patient also prefer to do elective surgery but family wants to do it now if possible .  CT abdomen ordered and will need to decide upon the result. Surgeon was called  in ER.   Patient was given 100 mcg of fentanyl prior to arrival and may be causing less pain at this time .      Procedure(s) (LRB):  REPAIR, HERNIA, EPIGASTRIC (N/A)      Hospital Course:   Patient was admitted with strangulated hernia.  General surgery was consulted and patient was taken to the OR.  She was monitored closely during her stay.  Her pain was well controlled.  She tolerated her diet.  She was cleared from general surgery perspective for discharge.  Patient was seen and evaluated on day of discharge and deemed appropriate.  Discharge instructions as well as return precautions were discussed with patient with good understanding.  Home health was secured prior to discharge.     Goals of Care Treatment Preferences:  Code Status: Full Code      Consults:   Consults (From admission, onward)          Status Ordering Provider     Inpatient consult to General surgery  Once        Provider:  Vipul Escobar  MD LITO    Completed ALICIA ROBERTO            No new Assessment & Plan notes have been filed under this hospital service since the last note was generated.  Service: Hospital Medicine    Final Active Diagnoses:    Diagnosis Date Noted POA    PRINCIPAL PROBLEM:  Strangulated hernia of abdominal wall [K43.6] 12/06/2023 Yes    Atrial fibrillation [I48.91] 06/16/2022 Yes    Iron deficiency anemia [D50.9] 05/18/2022 Yes    Essential hypertension [I10] 12/11/2018 Yes    Peripheral neuropathy [G62.9] 12/11/2018 Yes    Seizure disorder [G40.909] 12/11/2018 Yes      Problems Resolved During this Admission:       Discharged Condition: good    Disposition: Home or Self Care    Follow Up:   Follow-up Information       Alphonse Boothe III, MD. Go in 6 day(s).    Specialty: Family Medicine  Why: Hospital follow up schedled 12/14 at 3:20 p.m.  Contact information:  [address]  [address]  [place] [address]  [phone]               Vipul Escobar MD Follow up in 2 week(s).    Specialties: General Surgery, Bariatrics, Surgery  Contact information:  [address]  [address]  [place] [address]  [phone]               [facility]    Specialty: Home Health Services  Why: Home Health  Contact information:  [address]  [address]  [place] [address]  [phone]                           Patient Instructions:      Ambulatory referral/consult to Home Health   Standing Status: Future   Referral Priority: Routine Referral Type: Home Health   Referral Reason: Specialty Services Required   Requested Specialty: Home Health Services   Number of Visits Requested: 1     Diet Cardiac     Lifting restrictions     No driving until:     Notify your health care provider if you experience any of the following:  temperature >100.4     Notify your health care provider if you experience any of the following:  persistent nausea and vomiting or diarrhea     Notify your health care provider if you  experience any of the following:  severe uncontrolled pain     Notify your health care provider if you experience any of the following:  redness, tenderness, or signs of infection (pain, swelling, redness, odor or green/yellow discharge around incision site)     Activity as tolerated     Shower on day dressing removed (No bath)       Significant Diagnostic Studies: Labs: CMP   Recent Labs   Lab 12/06/23  1500 12/07/23  0351 12/08/23  0401    137 138   K 4.2 3.8 3.5    105 111*   CO2 26 22* 22*   * 107 95   BUN 13 10 10   CREATININE 0.6 0.5 0.6   CALCIUM 9.1 9.4 7.5*   PROT 6.7  --   --    ALBUMIN 3.6  --   --    BILITOT 0.2  --   --    ALKPHOS 110  --   --    AST 14  --   --    ALT 8*  --   --    ANIONGAP 7* 10 5*    and CBC   Recent Labs   Lab 12/06/23  1500 12/06/23  1655   WBC 6.83 6.13   HGB 8.8* 8.5*   HCT 30.9* 28.4*   * 471*     Radiology: CT scan: CT ABDOMEN PELVIS WITH CONTRAST:   Results for orders placed or performed during the hospital encounter of 12/06/23   CT Abdomen Pelvis With IV Contrast NO Oral Contrast    Narrative    CMS MANDATED QUALITY DATA - CT RADIATION - 436    One or more of the following dose-optimizing techniques was utilized for this exam: automated exposure control, adjustment of the mA and/or kV according to patient size, and/or use of iterative reconstruction technique.      HISTORY:Suspected incarcerated umbilical hernia is the history provided.    TECHNIQUE: Serial axial images were obtained from the domes of the diaphragm to the pubic symphysis with 100ccs Omnipaque 350 intravenous contrast. Oral contrast was not administered for this exam. Coronal and sagittal reconstructions were performed. Patient received 478 mGy-cm of radiation exposure from this exam.    COMPARISON: Comparison to previous study dated November 30, 2022.    FINDINGS:  Lung bases: Lung bases are free of infiltrate or pleural effusion.    Liver: Liver appears stable in size and contour.  Gallbladder appears nondistended. Several scattered small subcentimeter hepatic hypodensities are present most consistent with small hepatic cysts.    Pancreas: Pancreas is atrophic.    Spleen: Normal.    Adrenal glands: Mild adrenal gland thickening is present bilaterally with stable interval appearance.    Kidneys and ureters: Bilateral functioning kidneys are noted. No hydronephrosis is seen. Nonobstructing 4 mm left lower pole intrarenal calculi is again noted. Ureters appear nondilated.    Bladder: Bladder is unremarkable.    Bowel: Small hiatal hernia is present. Stomach appears nondistended. Fluid density is visualized in nondistended small bowel loops. Moderate volume of colonic fecal material is present in the ace ascending and transverse colon. No evidence of bowel obstruction is seen.    Peritoneum: Edema of the mesentery is visualized in the anterior peritoneal cavity beneath umbilicus. This edema may be secondary to hernia which has been recently reduced. Is there a history of reduction of the umbilical hernia.    Retroperitoneum: Unremarkable.    Lymph nodes: No evidence of abdominal or pelvic adenopathy is seen.    Abdominal wall: Small umbilical hernia is present with thickening of the skin in the region the umbilicus which may be secondary to recent reduction of umbilical hernia.    Bones: Postop changes of the left femur present. Severe degenerative changes of the lumbar spine are noted with levoscoliosis.    IMPRESSION:  1. Small fat-containing umbilical hernia with periumbilical skin thickening and mesenteric edema in the central abdomen beneath the site of the umbilical hernia. These imaging findings may be secondary to recent reduction of bowel containing umbilical hernia. No evidence of bowel obstruction or perforation is seen.        Electronically signed by:  Richard Bernard MD  12/06/2023 04:51 PM CST Workstation: [id number]       Pending Diagnostic Studies:       Procedure Component Value  Units Date/Time    Specimen to Pathology - Surgery [[id number]] Collected: 12/07/23 1100    Order Status: Sent Lab Status: No result     Specimen: Tissue            Medications:  Reconciled Home Medications:      Medication List        START taking these medications      ciprofloxacin HCl 500 MG tablet  Commonly known as: CIPRO  Take 1 tablet (500 mg total) by mouth every 12 (twelve) hours. for 10 days            CHANGE how you take these medications      cyclobenzaprine 10 MG tablet  Commonly known as: FLEXERIL  TAKE 1 TABLET(10 MG) BY MOUTH THREE TIMES DAILY AS NEEDED FOR MUSCLE SPASMS  What changed: when to take this     furosemide 40 MG tablet  Commonly known as: LASIX  TAKE 1 TABLET(40 MG) BY MOUTH DAILY AS NEEDED FOR SWELLING  What changed: See the new instructions.     pregabalin 200 MG Cap  Commonly known as: LYRICA  TAKE 1 CAPSULE(200 MG) BY MOUTH THREE TIMES DAILY  What changed:   how much to take  how to take this  when to take this            CONTINUE taking these medications      acetaminophen 325 MG tablet  Commonly known as: TYLENOL  Take 650 mg by mouth every 8 (eight) hours as needed for Pain.     amiodarone 200 MG Tab  Commonly known as: PACERONE  Take 1 tablet (200 mg total) by mouth once daily.     amlodipine-benazepril 5-20 mg 5-20 mg per capsule  Commonly known as: LOTREL  Take 1 capsule by mouth once daily.     docusate sodium 100 MG capsule  Commonly known as: COLACE  Take 1 capsule (100 mg total) by mouth 2 (two) times daily.     EScitalopram oxalate 20 MG tablet  Commonly known as: LEXAPRO  TAKE 1 TABLET(20 MG) BY MOUTH EVERY DAY     metoprolol tartrate 50 MG tablet  Commonly known as: LOPRESSOR  Take 1 tablet (50 mg total) by mouth 3 (three) times daily.     omeprazole 40 MG capsule  Commonly known as: PRILOSEC  TAKE 1 CAPSULE(40 MG) BY MOUTH EVERY MORNING     oxyCODONE-acetaminophen  mg per tablet  Commonly known as: PERCOCET  Take 1 tablet by mouth every 4 to 6 hours as needed for  Pain.     traMADoL 50 mg tablet  Commonly known as: ULTRAM  Take 2 tablets by mouth every 12 (twelve) hours as needed for Pain.            ASK your doctor about these medications      apixaban 2.5 mg Tab  Commonly known as: ELIQUIS  Take 1 tablet (2.5 mg total) by mouth 2 (two) times daily.              Indwelling Lines/Drains at time of discharge:   Lines/Drains/Airways       None                   Time spent on the discharge of patient: 25 minutes         Renuka Castillo NP  Department of Hospital Medicine  [facility]

## 2023-12-08 NOTE — PLAN OF CARE
HH packet sent to [facility] for review. CM requested HH orders. AVS updated. CM following for acceptance.    12/08/23 0830   Post-Acute Status   Post-Acute Authorization Home Health   Home Health Status Referrals Sent   Patient choice form signed by patient/caregiver List with quality metrics by geographic area provided

## 2023-12-08 NOTE — PLAN OF CARE
The pt is cleared for discharge home from case management with [facility] and has follow up appts added to her AVS.    12/08/23 1006   Final Note   Assessment Type Final Discharge Note   Anticipated Discharge Disposition Home-Health   What phone number can be called within the next 1-3 days to see how you are doing after discharge? [phone]   Hospital Resources/Appts/Education Provided Appointments scheduled and added to AVS   Post-Acute Status   Post-Acute Authorization Home Health   Home Health Status Set-up Complete/Auth obtained   Patient choice form signed by patient/caregiver List with quality metrics by geographic area provided   Discharge Delays None known at this time

## 2023-12-08 NOTE — PLAN OF CARE
12/07/23 1937   Patient Assessment/Suction   Level of Consciousness (AVPU) alert   Respiratory Effort Unlabored   Expansion/Accessory Muscles/Retractions expansion symmetric   All Lung Fields Breath Sounds clear   Rhythm/Pattern, Respiratory depth regular;pattern regular   Cough Frequency infrequent   Cough Type good;nonproductive   PRE-TX-O2   Device (Oxygen Therapy) nasal cannula   $ Is the patient on Low Flow Oxygen? Yes   Flow (L/min) 1   SpO2 96 %   Education   $ Education DME Oxygen;15 min

## 2023-12-08 NOTE — PLAN OF CARE
Problem: Adult Inpatient Plan of Care  Goal: Plan of Care Review  Outcome: Met  Goal: Patient-Specific Goal (Individualized)  Outcome: Met  Goal: Absence of Hospital-Acquired Illness or Injury  Outcome: Met  Goal: Optimal Comfort and Wellbeing  Outcome: Met  Goal: Readiness for Transition of Care  Outcome: Met     Problem: Adjustment to Illness (Sepsis/Septic Shock)  Goal: Optimal Coping  Outcome: Met     Problem: Bleeding (Sepsis/Septic Shock)  Goal: Absence of Bleeding  Outcome: Met     Problem: Glycemic Control Impaired (Sepsis/Septic Shock)  Goal: Blood Glucose Level Within Desired Range  Outcome: Met     Problem: Infection Progression (Sepsis/Septic Shock)  Goal: Absence of Infection Signs and Symptoms  Outcome: Met     Problem: Nutrition Impaired (Sepsis/Septic Shock)  Goal: Optimal Nutrition Intake  Outcome: Met     Problem: Fluid and Electrolyte Imbalance (Acute Kidney Injury/Impairment)  Goal: Fluid and Electrolyte Balance  Outcome: Met     Problem: Oral Intake Inadequate (Acute Kidney Injury/Impairment)  Goal: Optimal Nutrition Intake  Outcome: Met     Problem: Renal Function Impairment (Acute Kidney Injury/Impairment)  Goal: Effective Renal Function  Outcome: Met     Problem: Skin Injury Risk Increased  Goal: Skin Health and Integrity  Outcome: Met

## 2023-12-08 NOTE — PLAN OF CARE
The pt is accepted with [facility] for admit tomorrow. AVS updated. Discharge plan closed in [facility].    12/08/23 1005   Post-Acute Status   Post-Acute Authorization Home Health   Home Health Status Set-up Complete/Auth obtained

## 2023-12-08 NOTE — PLAN OF CARE
HH orders sent to [facility]    12/08/23 0839   Post-Acute Status   Post-Acute Authorization Home Health   Home Health Status Referrals Sent

## 2023-12-09 PROCEDURE — G0180 MD CERTIFICATION HHA PATIENT: HCPCS | Mod: ,,, | Performed by: FAMILY MEDICINE

## 2023-12-09 PROCEDURE — G0180 PR HOME HEALTH MD CERTIFICATION: ICD-10-PCS | Mod: ,,, | Performed by: FAMILY MEDICINE

## 2023-12-11 ENCOUNTER — TELEPHONE (OUTPATIENT)
Dept: FAMILY MEDICINE | Facility: CLINIC | Age: 78
End: 2023-12-11
Payer: MEDICARE

## 2023-12-19 ENCOUNTER — TELEPHONE (OUTPATIENT)
Dept: BARIATRICS | Facility: CLINIC | Age: 78
End: 2023-12-19
Payer: MEDICARE

## 2023-12-19 NOTE — TELEPHONE ENCOUNTER
----- Message from Amara Bolton sent at 12/19/2023 11:59 AM CST -----  Regarding: appt  Type:  Sooner Apoointment Request      Name of Caller:pt    When is the first available appointment?dept booked     Symptoms:stiches removed       Best Call Back Number:[phone]      Additional Information: pt wants an appt for next Thursday or Friday.  please call to discuss.

## 2023-12-19 NOTE — TELEPHONE ENCOUNTER
----- Message from Amara Bolton sent at 12/19/2023 11:59 AM CST -----  Regarding: appt  Type:  Sooner Apoointment Request      Name of Caller:pt    When is the first available appointment?dept booked     Symptoms:stiches removed       Best Call Back Number:[phone]      Additional Information: pt wants an appt for next Thursday or Friday.  please call to discuss.

## 2023-12-20 ENCOUNTER — OFFICE VISIT (OUTPATIENT)
Dept: SURGERY | Facility: CLINIC | Age: 78
End: 2023-12-20
Payer: MEDICARE

## 2023-12-20 ENCOUNTER — TELEPHONE (OUTPATIENT)
Dept: BARIATRICS | Facility: CLINIC | Age: 78
End: 2023-12-20
Payer: MEDICARE

## 2023-12-20 VITALS
HEART RATE: 73 BPM | DIASTOLIC BLOOD PRESSURE: 85 MMHG | TEMPERATURE: 98 F | WEIGHT: 151 LBS | BODY MASS INDEX: 24.27 KG/M2 | SYSTOLIC BLOOD PRESSURE: 170 MMHG | HEIGHT: 66 IN

## 2023-12-20 DIAGNOSIS — Z87.19 S/P HERNIA REPAIR: Primary | ICD-10-CM

## 2023-12-20 DIAGNOSIS — Z98.890 S/P HERNIA REPAIR: Primary | ICD-10-CM

## 2023-12-20 DIAGNOSIS — M25.552 LEFT HIP PAIN: ICD-10-CM

## 2023-12-20 PROCEDURE — 1101F PT FALLS ASSESS-DOCD LE1/YR: CPT | Mod: CPTII,S$GLB,, | Performed by: SURGERY

## 2023-12-20 PROCEDURE — 3077F SYST BP >= 140 MM HG: CPT | Mod: CPTII,S$GLB,, | Performed by: SURGERY

## 2023-12-20 PROCEDURE — 1101F PR PT FALLS ASSESS DOC 0-1 FALLS W/OUT INJ PAST YR: ICD-10-PCS | Mod: CPTII,S$GLB,, | Performed by: SURGERY

## 2023-12-20 PROCEDURE — 1125F PR PAIN SEVERITY QUANTIFIED, PAIN PRESENT: ICD-10-PCS | Mod: CPTII,S$GLB,, | Performed by: SURGERY

## 2023-12-20 PROCEDURE — 1160F PR REVIEW ALL MEDS BY PRESCRIBER/CLIN PHARMACIST DOCUMENTED: ICD-10-PCS | Mod: CPTII,S$GLB,, | Performed by: SURGERY

## 2023-12-20 PROCEDURE — 1111F PR DISCHARGE MEDS RECONCILED W/ CURRENT OUTPATIENT MED LIST: ICD-10-PCS | Mod: CPTII,S$GLB,, | Performed by: SURGERY

## 2023-12-20 PROCEDURE — 99999 PR PBB SHADOW E&M-EST. PATIENT-LVL III: CPT | Mod: PBBFAC,,,

## 2023-12-20 PROCEDURE — 99212 PR OFFICE/OUTPT VISIT, EST, LEVL II, 10-19 MIN: ICD-10-PCS | Mod: S$GLB,,, | Performed by: SURGERY

## 2023-12-20 PROCEDURE — 99999 PR PBB SHADOW E&M-EST. PATIENT-LVL III: ICD-10-PCS | Mod: PBBFAC,,,

## 2023-12-20 PROCEDURE — 1125F AMNT PAIN NOTED PAIN PRSNT: CPT | Mod: CPTII,S$GLB,, | Performed by: SURGERY

## 2023-12-20 PROCEDURE — 3077F PR MOST RECENT SYSTOLIC BLOOD PRESSURE >= 140 MM HG: ICD-10-PCS | Mod: CPTII,S$GLB,, | Performed by: SURGERY

## 2023-12-20 PROCEDURE — 99212 OFFICE O/P EST SF 10 MIN: CPT | Mod: S$GLB,,, | Performed by: SURGERY

## 2023-12-20 PROCEDURE — 3079F PR MOST RECENT DIASTOLIC BLOOD PRESSURE 80-89 MM HG: ICD-10-PCS | Mod: CPTII,S$GLB,, | Performed by: SURGERY

## 2023-12-20 PROCEDURE — 1159F PR MEDICATION LIST DOCUMENTED IN MEDICAL RECORD: ICD-10-PCS | Mod: CPTII,S$GLB,, | Performed by: SURGERY

## 2023-12-20 PROCEDURE — 3288F PR FALLS RISK ASSESSMENT DOCUMENTED: ICD-10-PCS | Mod: CPTII,S$GLB,, | Performed by: SURGERY

## 2023-12-20 PROCEDURE — 3288F FALL RISK ASSESSMENT DOCD: CPT | Mod: CPTII,S$GLB,, | Performed by: SURGERY

## 2023-12-20 PROCEDURE — 3079F DIAST BP 80-89 MM HG: CPT | Mod: CPTII,S$GLB,, | Performed by: SURGERY

## 2023-12-20 PROCEDURE — 1160F RVW MEDS BY RX/DR IN RCRD: CPT | Mod: CPTII,S$GLB,, | Performed by: SURGERY

## 2023-12-20 PROCEDURE — 1159F MED LIST DOCD IN RCRD: CPT | Mod: CPTII,S$GLB,, | Performed by: SURGERY

## 2023-12-20 PROCEDURE — 1111F DSCHRG MED/CURRENT MED MERGE: CPT | Mod: CPTII,S$GLB,, | Performed by: SURGERY

## 2023-12-20 NOTE — TELEPHONE ENCOUNTER
Returned call to pt. She reports that she was on her way to her appointment, but had to turn around and go home to use the restroom. Rescheduled patient for later this morning. Informed patient that it is important for her to try to come in this morning because we have a provider here until noon today, after that there will not be a provider until after the new year. Pt verbalized understanding.     ----- Message from Julianne Salmeron, Patient Care Assistant sent at 12/20/2023  8:40 AM CST -----  Regarding: appointment  Contact: pt  Type: Needs Medical Advice    Who Called:  pt     Best Call Back Number: [phone] (home)     Additional Information: pt states she would like a callback regarding rescheduling her post op appointment on 12/20/23. Please call pt to advise. Thanks!

## 2023-12-21 NOTE — PROGRESS NOTES
" Clinic Follow-up  General Surgery    Date: 12/21/2023  Interval: Froilan Ray is a 78 y.o. female seen today in follow up sp epigastric hernia repair on 12/7/23 by Dr. Escobar        SUBJECTIVE:     Patient states pain is 10/10 to left hip, progressing over several months. Received steroid injections by ortho, Dr German, this past week (unable to provide documentation). Pt attempted to contact Ortho for additional assistance with pain on 12/19/23 but no return call. Has pain meds at pharmacy to     OBJECTIVE:     Vital Signs (Most Recent)  Vitals:    12/20/23 1028   BP: (!) 170/85   BP Location: Right arm   Patient Position: Sitting   BP Method: Medium (Automatic)   Pulse: 73   Temp: 97.6 °F (36.4 °C)   Weight: 68.5 kg (151 lb)   Height: 5' 6" (1.676 m)       Review of Systems - History obtained from chart review and the patient  General ROS: positive for  - fatigue  Gastrointestinal ROS: negative for - abdominal pain, appetite loss, change in bowel habits, or change in stools  Musculoskeletal ROS: positive for - gait disturbance, joint pain, joint swelling, muscle pain, and pain in hip - left, generalized    Physical Exam:  General: well developed, well nourished, appears older than stated age, no distress  Lungs:  normal respiratory effort  Heart: normal apical impulse  Abdomen: soft, non-tender non-distented; bowel sounds normal; no masses,  no organomegaly  Musculoskeletal: left hip pain with palpation, unable to bear weight. No shortening or malrotation noted. CMS present      Wound/Incision:  clean, dry, intact, no drainage, healed        Laboratory:  Labs within the past 24 hours have been reviewed.    Pathology: YES  Negative    ASSESSMENT/PLAN:     Assessment: uncomplicated post-operative course for hernia repair      Plan: continue current treatment, follow up with Dr. German with orth .  Gave patient several options in regards to left hip  Outpatient XR and if abnormal then would send to " ED  Calling Dr German for directions- pt preferred  ED for hip work up      Debora Mcdonough, NP

## 2023-12-22 ENCOUNTER — EXTERNAL HOME HEALTH (OUTPATIENT)
Dept: HOME HEALTH SERVICES | Facility: HOSPITAL | Age: 78
End: 2023-12-22
Payer: MEDICARE

## 2024-01-02 ENCOUNTER — TELEPHONE (OUTPATIENT)
Dept: BARIATRICS | Facility: CLINIC | Age: 79
End: 2024-01-02
Payer: MEDICARE

## 2024-01-02 NOTE — TELEPHONE ENCOUNTER
----- Message from Amira Nolan sent at 1/2/2024 11:55 AM CST -----  Type: Needs Medical Advice  Who Called: Brianna with [facility] Call Back Number: [phone]  Additional Information: Brianna is calling in regards to pt's incision from a previous surgery. Pt was seen in the office last week and the incision was healing pretty well but now the incision is draining with burning and itching. Pt told the Home Health nurse the draining, itching and burining started aprox 12/30 Please call back and advise. Thanks!

## 2024-01-03 ENCOUNTER — OFFICE VISIT (OUTPATIENT)
Dept: BARIATRICS | Facility: CLINIC | Age: 79
End: 2024-01-03
Payer: MEDICARE

## 2024-01-03 VITALS
SYSTOLIC BLOOD PRESSURE: 156 MMHG | HEART RATE: 90 BPM | RESPIRATION RATE: 16 BRPM | TEMPERATURE: 98 F | WEIGHT: 151 LBS | HEIGHT: 66 IN | DIASTOLIC BLOOD PRESSURE: 79 MMHG | BODY MASS INDEX: 24.27 KG/M2

## 2024-01-03 DIAGNOSIS — K43.6 STRANGULATED HERNIA OF ABDOMINAL WALL: Primary | ICD-10-CM

## 2024-01-03 PROCEDURE — 1125F AMNT PAIN NOTED PAIN PRSNT: CPT | Mod: CPTII,S$GLB,, | Performed by: SURGERY

## 2024-01-03 PROCEDURE — 3078F DIAST BP <80 MM HG: CPT | Mod: CPTII,S$GLB,, | Performed by: SURGERY

## 2024-01-03 PROCEDURE — 1101F PT FALLS ASSESS-DOCD LE1/YR: CPT | Mod: CPTII,S$GLB,, | Performed by: SURGERY

## 2024-01-03 PROCEDURE — 3077F SYST BP >= 140 MM HG: CPT | Mod: CPTII,S$GLB,, | Performed by: SURGERY

## 2024-01-03 PROCEDURE — 99999 PR PBB SHADOW E&M-EST. PATIENT-LVL III: CPT | Mod: PBBFAC,,, | Performed by: SURGERY

## 2024-01-03 PROCEDURE — 99213 OFFICE O/P EST LOW 20 MIN: CPT | Mod: S$GLB,,, | Performed by: SURGERY

## 2024-01-03 PROCEDURE — 1111F DSCHRG MED/CURRENT MED MERGE: CPT | Mod: CPTII,S$GLB,, | Performed by: SURGERY

## 2024-01-03 PROCEDURE — 1159F MED LIST DOCD IN RCRD: CPT | Mod: CPTII,S$GLB,, | Performed by: SURGERY

## 2024-01-03 PROCEDURE — 3288F FALL RISK ASSESSMENT DOCD: CPT | Mod: CPTII,S$GLB,, | Performed by: SURGERY

## 2024-01-03 RX ORDER — SULFAMETHOXAZOLE AND TRIMETHOPRIM 800; 160 MG/1; MG/1
1 TABLET ORAL 2 TIMES DAILY
Qty: 28 TABLET | Refills: 0 | Status: ON HOLD | OUTPATIENT
Start: 2024-01-03 | End: 2024-01-27 | Stop reason: HOSPADM

## 2024-01-03 NOTE — PROGRESS NOTES
Doing well generally  Planning to have a hip surgery  Had some drainage at umbilicus    Vitals:    01/03/24 1033   BP: (!) 156/79   Pulse: 90   Resp: 16   Temp: 98.4 °F (36.9 °C)     Abdomen benign  Mild yellow drainage from skin tracking deep to suture    A/P    Suture abscess-  Bandage wound  Antibiotics  Daily wound care

## 2024-01-09 ENCOUNTER — TELEPHONE (OUTPATIENT)
Dept: FAMILY MEDICINE | Facility: CLINIC | Age: 79
End: 2024-01-09
Payer: MEDICARE

## 2024-01-09 NOTE — TELEPHONE ENCOUNTER
----- Message from Michael Hinds sent at 1/9/2024  3:24 PM CST -----  Regarding: return call  Contact: Jose Sacha  Type:  Patient Returning Call    Who Called:Jose Walls  Who Left Message for Patient:office nurse  Does the patient know what this is regarding?:question about patient's condition  Would the patient rather a call back or a response via [facility]? Please call  Best Call Back Number:[phone]  Additional Information:

## 2024-01-10 ENCOUNTER — TELEPHONE (OUTPATIENT)
Dept: BARIATRICS | Facility: CLINIC | Age: 79
End: 2024-01-10
Payer: MEDICARE

## 2024-01-10 DIAGNOSIS — B37.9 CANDIDA INFECTION: Primary | ICD-10-CM

## 2024-01-10 RX ORDER — FLUCONAZOLE 100 MG/1
100 TABLET ORAL DAILY
Qty: 10 TABLET | Refills: 0 | Status: ON HOLD | OUTPATIENT
Start: 2024-01-10 | End: 2024-01-27 | Stop reason: HOSPADM

## 2024-01-10 NOTE — TELEPHONE ENCOUNTER
----- Message from Cherry Medina sent at 1/10/2024 11:29 AM CST -----  Contact: Sacha (son)  Type:  Needs Medical Advice    Who Called: Sacha    Symptoms (please be specific): thrush     How long has patient had these symptoms:  3 days    Pharmacy name and phone #:    [id number] - [place], [place] - [address] & [facility]  [address]  [facility] [address]  Phone: [phone] Fax: [phone]    Would the patient rather a call back or a response via [facility]? Call back    Best Call Back Number: [phone] (son)    Additional Information: pt was given antibiotics and now has thrush.     Can something be called in for that?    Please call to advise  Thanks\

## 2024-01-10 NOTE — TELEPHONE ENCOUNTER
called pt's son back (Sacha) and let him know we would be calling a prescription in for Duke[facility] to [facility] on . Son understood

## 2024-01-16 ENCOUNTER — HOSPITAL ENCOUNTER (EMERGENCY)
Facility: HOSPITAL | Age: 79
Discharge: ANOTHER HOSPITAL | End: 2024-01-17
Attending: EMERGENCY MEDICINE
Payer: MEDICAID

## 2024-01-16 ENCOUNTER — OFFICE VISIT (OUTPATIENT)
Dept: FAMILY MEDICINE | Facility: CLINIC | Age: 79
End: 2024-01-16
Payer: MEDICARE

## 2024-01-16 VITALS
DIASTOLIC BLOOD PRESSURE: 84 MMHG | BODY MASS INDEX: 24.37 KG/M2 | TEMPERATURE: 97 F | SYSTOLIC BLOOD PRESSURE: 136 MMHG | HEIGHT: 66 IN | HEART RATE: 89 BPM | OXYGEN SATURATION: 95 %

## 2024-01-16 DIAGNOSIS — M00.9 PYOGENIC ARTHRITIS OF LEFT HIP, DUE TO UNSPECIFIED ORGANISM: Primary | ICD-10-CM

## 2024-01-16 DIAGNOSIS — Z87.81 S/P ORIF (OPEN REDUCTION INTERNAL FIXATION) FRACTURE: ICD-10-CM

## 2024-01-16 DIAGNOSIS — Z79.01 ANTICOAGULATED: ICD-10-CM

## 2024-01-16 DIAGNOSIS — D50.9 IRON DEFICIENCY ANEMIA, UNSPECIFIED IRON DEFICIENCY ANEMIA TYPE: ICD-10-CM

## 2024-01-16 DIAGNOSIS — K25.4 CHRONIC GASTRIC ULCER WITH BLEEDING: ICD-10-CM

## 2024-01-16 DIAGNOSIS — I50.20 HFREF (HEART FAILURE WITH REDUCED EJECTION FRACTION): Primary | ICD-10-CM

## 2024-01-16 DIAGNOSIS — I48.91 ATRIAL FIBRILLATION, UNSPECIFIED TYPE: ICD-10-CM

## 2024-01-16 DIAGNOSIS — I10 ESSENTIAL HYPERTENSION: ICD-10-CM

## 2024-01-16 DIAGNOSIS — I48.91 A-FIB: ICD-10-CM

## 2024-01-16 DIAGNOSIS — I48.91 ATRIAL FIBRILLATION WITH RVR: ICD-10-CM

## 2024-01-16 DIAGNOSIS — I48.91 ATRIAL FIBRILLATION WITH RAPID VENTRICULAR RESPONSE: ICD-10-CM

## 2024-01-16 DIAGNOSIS — Z98.890 S/P ORIF (OPEN REDUCTION INTERNAL FIXATION) FRACTURE: ICD-10-CM

## 2024-01-16 DIAGNOSIS — R00.0 TACHYCARDIA: ICD-10-CM

## 2024-01-16 DIAGNOSIS — M25.552 LEFT HIP PAIN: ICD-10-CM

## 2024-01-16 DIAGNOSIS — B37.0 ORAL THRUSH: ICD-10-CM

## 2024-01-16 DIAGNOSIS — Z01.818 PREOPERATIVE CLEARANCE: ICD-10-CM

## 2024-01-16 PROBLEM — I50.30 (HFPEF) HEART FAILURE WITH PRESERVED EJECTION FRACTION: Status: ACTIVE | Noted: 2024-01-16

## 2024-01-16 PROBLEM — R65.20 SEVERE SEPSIS: Status: ACTIVE | Noted: 2022-06-15

## 2024-01-16 LAB
ALBUMIN SERPL BCP-MCNC: 3 G/DL (ref 3.5–5.2)
ALP SERPL-CCNC: 243 U/L (ref 55–135)
ALT SERPL W/O P-5'-P-CCNC: 158 U/L (ref 10–44)
AMORPH CRY URNS QL MICRO: ABNORMAL
ANION GAP SERPL CALC-SCNC: 13 MMOL/L (ref 8–16)
AST SERPL-CCNC: 260 U/L (ref 10–40)
BACTERIA #/AREA URNS HPF: ABNORMAL /HPF
BASOPHILS # BLD AUTO: 0.08 K/UL (ref 0–0.2)
BASOPHILS NFR BLD: 0.3 % (ref 0–1.9)
BILIRUB SERPL-MCNC: 1.2 MG/DL (ref 0.1–1)
BILIRUB UR QL STRIP: NEGATIVE
BNP SERPL-MCNC: 290 PG/ML (ref 0–99)
BUN SERPL-MCNC: 15 MG/DL (ref 8–23)
CALCIUM SERPL-MCNC: 9 MG/DL (ref 8.7–10.5)
CHLORIDE SERPL-SCNC: 92 MMOL/L (ref 95–110)
CLARITY UR: ABNORMAL
CO2 SERPL-SCNC: 23 MMOL/L (ref 23–29)
COLOR UR: YELLOW
CREAT SERPL-MCNC: 0.6 MG/DL (ref 0.5–1.4)
CREAT SERPL-MCNC: 0.7 MG/DL (ref 0.5–1.4)
DIFFERENTIAL METHOD BLD: ABNORMAL
EOSINOPHIL # BLD AUTO: 0 K/UL (ref 0–0.5)
EOSINOPHIL NFR BLD: 0 % (ref 0–8)
ERYTHROCYTE [DISTWIDTH] IN BLOOD BY AUTOMATED COUNT: 16 % (ref 11.5–14.5)
ERYTHROCYTE [SEDIMENTATION RATE] IN BLOOD BY WESTERGREN METHOD: >90 MM/HR (ref 0–20)
EST. GFR  (NO RACE VARIABLE): >60 ML/MIN/1.73 M^2
GLUCOSE SERPL-MCNC: 104 MG/DL (ref 70–110)
GLUCOSE UR QL STRIP: NEGATIVE
HCT VFR BLD AUTO: 29.8 % (ref 37–48.5)
HGB BLD-MCNC: 9.4 G/DL (ref 12–16)
HGB UR QL STRIP: ABNORMAL
HYALINE CASTS #/AREA URNS LPF: 2 /LPF
IMM GRANULOCYTES # BLD AUTO: 0.96 K/UL (ref 0–0.04)
IMM GRANULOCYTES NFR BLD AUTO: 3.4 % (ref 0–0.5)
INFLUENZA A, MOLECULAR: NEGATIVE
INFLUENZA B, MOLECULAR: NEGATIVE
KETONES UR QL STRIP: NEGATIVE
LACTATE SERPL-SCNC: 1.3 MMOL/L (ref 0.5–1.9)
LACTATE SERPL-SCNC: 2.2 MMOL/L (ref 0.5–1.9)
LEUKOCYTE ESTERASE UR QL STRIP: ABNORMAL
LYMPHOCYTES # BLD AUTO: 0.8 K/UL (ref 1–4.8)
LYMPHOCYTES NFR BLD: 2.8 % (ref 18–48)
MCH RBC QN AUTO: 23.7 PG (ref 27–31)
MCHC RBC AUTO-ENTMCNC: 31.5 G/DL (ref 32–36)
MCV RBC AUTO: 75 FL (ref 82–98)
MICROSCOPIC COMMENT: ABNORMAL
MONOCYTES # BLD AUTO: 1.3 K/UL (ref 0.3–1)
MONOCYTES NFR BLD: 4.7 % (ref 4–15)
NEUTROPHILS # BLD AUTO: 25.4 K/UL (ref 1.8–7.7)
NEUTROPHILS NFR BLD: 88.8 % (ref 38–73)
NITRITE UR QL STRIP: NEGATIVE
NRBC BLD-RTO: 0 /100 WBC
PH UR STRIP: 7 [PH] (ref 5–8)
PLATELET # BLD AUTO: 698 K/UL (ref 150–450)
PLATELET BLD QL SMEAR: ABNORMAL
PMV BLD AUTO: 9.6 FL (ref 9.2–12.9)
POTASSIUM SERPL-SCNC: 3.7 MMOL/L (ref 3.5–5.1)
PROCALCITONIN SERPL IA-MCNC: 5.3 NG/ML (ref 0–0.5)
PROT SERPL-MCNC: 7.5 G/DL (ref 6–8.4)
PROT UR QL STRIP: ABNORMAL
RBC # BLD AUTO: 3.97 M/UL (ref 4–5.4)
RBC #/AREA URNS HPF: 10 /HPF (ref 0–4)
SAMPLE: NORMAL
SARS-COV-2 RDRP RESP QL NAA+PROBE: NEGATIVE
SODIUM SERPL-SCNC: 128 MMOL/L (ref 136–145)
SP GR UR STRIP: 1.02 (ref 1–1.03)
SPECIMEN SOURCE: NORMAL
SQUAMOUS #/AREA URNS HPF: 0 /HPF
TOXIC GRANULES BLD QL SMEAR: PRESENT
TROPONIN I SERPL HS-MCNC: 21.1 PG/ML (ref 0–14.9)
URN SPEC COLLECT METH UR: ABNORMAL
UROBILINOGEN UR STRIP-ACNC: ABNORMAL EU/DL
WBC # BLD AUTO: 28.57 K/UL (ref 3.9–12.7)
WBC #/AREA URNS HPF: >100 /HPF (ref 0–5)

## 2024-01-16 PROCEDURE — U0002 COVID-19 LAB TEST NON-CDC: HCPCS | Performed by: EMERGENCY MEDICINE

## 2024-01-16 PROCEDURE — 87186 SC STD MICRODIL/AGAR DIL: CPT | Mod: 59 | Performed by: EMERGENCY MEDICINE

## 2024-01-16 PROCEDURE — 87077 CULTURE AEROBIC IDENTIFY: CPT | Mod: 59 | Performed by: EMERGENCY MEDICINE

## 2024-01-16 PROCEDURE — 87147 CULTURE TYPE IMMUNOLOGIC: CPT | Performed by: EMERGENCY MEDICINE

## 2024-01-16 PROCEDURE — 99214 OFFICE O/P EST MOD 30 MIN: CPT | Mod: S$GLB,,,

## 2024-01-16 PROCEDURE — 93005 ELECTROCARDIOGRAM TRACING: CPT | Performed by: GENERAL PRACTICE

## 2024-01-16 PROCEDURE — 87086 URINE CULTURE/COLONY COUNT: CPT | Performed by: EMERGENCY MEDICINE

## 2024-01-16 PROCEDURE — 84484 ASSAY OF TROPONIN QUANT: CPT | Performed by: EMERGENCY MEDICINE

## 2024-01-16 PROCEDURE — 83605 ASSAY OF LACTIC ACID: CPT | Performed by: EMERGENCY MEDICINE

## 2024-01-16 PROCEDURE — 87154 CUL TYP ID BLD PTHGN 6+ TRGT: CPT | Performed by: EMERGENCY MEDICINE

## 2024-01-16 PROCEDURE — 96376 TX/PRO/DX INJ SAME DRUG ADON: CPT

## 2024-01-16 PROCEDURE — 63600175 PHARM REV CODE 636 W HCPCS: Performed by: EMERGENCY MEDICINE

## 2024-01-16 PROCEDURE — 81001 URINALYSIS AUTO W/SCOPE: CPT | Performed by: EMERGENCY MEDICINE

## 2024-01-16 PROCEDURE — 25000003 PHARM REV CODE 250: Performed by: EMERGENCY MEDICINE

## 2024-01-16 PROCEDURE — 85651 RBC SED RATE NONAUTOMATED: CPT | Performed by: EMERGENCY MEDICINE

## 2024-01-16 PROCEDURE — 96366 THER/PROPH/DIAG IV INF ADDON: CPT

## 2024-01-16 PROCEDURE — 25500020 PHARM REV CODE 255: Performed by: EMERGENCY MEDICINE

## 2024-01-16 PROCEDURE — 84145 PROCALCITONIN (PCT): CPT | Performed by: EMERGENCY MEDICINE

## 2024-01-16 PROCEDURE — 87040 BLOOD CULTURE FOR BACTERIA: CPT | Mod: 59 | Performed by: EMERGENCY MEDICINE

## 2024-01-16 PROCEDURE — 87502 INFLUENZA DNA AMP PROBE: CPT | Performed by: EMERGENCY MEDICINE

## 2024-01-16 PROCEDURE — 85025 COMPLETE CBC W/AUTO DIFF WBC: CPT | Performed by: EMERGENCY MEDICINE

## 2024-01-16 PROCEDURE — 96375 TX/PRO/DX INJ NEW DRUG ADDON: CPT

## 2024-01-16 PROCEDURE — 99292 CRITICAL CARE ADDL 30 MIN: CPT

## 2024-01-16 PROCEDURE — 99999 PR PBB SHADOW E&M-EST. PATIENT-LVL III: CPT | Mod: PBBFAC,,,

## 2024-01-16 PROCEDURE — 3079F DIAST BP 80-89 MM HG: CPT | Mod: CPTII,S$GLB,,

## 2024-01-16 PROCEDURE — 82565 ASSAY OF CREATININE: CPT

## 2024-01-16 PROCEDURE — 83880 ASSAY OF NATRIURETIC PEPTIDE: CPT | Performed by: EMERGENCY MEDICINE

## 2024-01-16 PROCEDURE — 93010 ELECTROCARDIOGRAM REPORT: CPT | Mod: 76,,, | Performed by: GENERAL PRACTICE

## 2024-01-16 PROCEDURE — 99291 CRITICAL CARE FIRST HOUR: CPT

## 2024-01-16 PROCEDURE — 86140 C-REACTIVE PROTEIN: CPT | Performed by: EMERGENCY MEDICINE

## 2024-01-16 PROCEDURE — 3075F SYST BP GE 130 - 139MM HG: CPT | Mod: CPTII,S$GLB,,

## 2024-01-16 PROCEDURE — 96365 THER/PROPH/DIAG IV INF INIT: CPT

## 2024-01-16 PROCEDURE — 80053 COMPREHEN METABOLIC PANEL: CPT | Performed by: EMERGENCY MEDICINE

## 2024-01-16 PROCEDURE — 96361 HYDRATE IV INFUSION ADD-ON: CPT

## 2024-01-16 RX ORDER — METOPROLOL TARTRATE 1 MG/ML
5 INJECTION, SOLUTION INTRAVENOUS
Status: COMPLETED | OUTPATIENT
Start: 2024-01-16 | End: 2024-01-16

## 2024-01-16 RX ORDER — NYSTATIN 100000 [USP'U]/ML
4 SUSPENSION ORAL 4 TIMES DAILY
Qty: 160 ML | Refills: 0 | Status: CANCELLED | OUTPATIENT
Start: 2024-01-16 | End: 2024-01-26

## 2024-01-16 RX ORDER — MORPHINE SULFATE 4 MG/ML
4 INJECTION, SOLUTION INTRAMUSCULAR; INTRAVENOUS
Status: COMPLETED | OUTPATIENT
Start: 2024-01-16 | End: 2024-01-16

## 2024-01-16 RX ORDER — ONDANSETRON HYDROCHLORIDE 2 MG/ML
4 INJECTION, SOLUTION INTRAVENOUS
Status: COMPLETED | OUTPATIENT
Start: 2024-01-16 | End: 2024-01-16

## 2024-01-16 RX ORDER — DILTIAZEM HYDROCHLORIDE 5 MG/ML
10 INJECTION INTRAVENOUS
Status: COMPLETED | OUTPATIENT
Start: 2024-01-16 | End: 2024-01-16

## 2024-01-16 RX ORDER — DIGOXIN 0.25 MG/ML
500 INJECTION INTRAMUSCULAR; INTRAVENOUS
Status: COMPLETED | OUTPATIENT
Start: 2024-01-16 | End: 2024-01-16

## 2024-01-16 RX ORDER — ACETAMINOPHEN 325 MG/1
650 TABLET ORAL EVERY 4 HOURS PRN
Status: CANCELLED | OUTPATIENT
Start: 2024-01-16

## 2024-01-16 RX ORDER — ACETAMINOPHEN 500 MG
1000 TABLET ORAL ONCE
Status: COMPLETED | OUTPATIENT
Start: 2024-01-16 | End: 2024-01-16

## 2024-01-16 RX ORDER — SODIUM CHLORIDE 0.9 % (FLUSH) 0.9 %
10 SYRINGE (ML) INJECTION
Status: CANCELLED | OUTPATIENT
Start: 2024-01-16

## 2024-01-16 RX ORDER — ONDANSETRON HYDROCHLORIDE 2 MG/ML
4 INJECTION, SOLUTION INTRAVENOUS EVERY 8 HOURS PRN
Status: CANCELLED | OUTPATIENT
Start: 2024-01-16

## 2024-01-16 RX ADMIN — MORPHINE SULFATE 4 MG: 4 INJECTION, SOLUTION INTRAMUSCULAR; INTRAVENOUS at 03:01

## 2024-01-16 RX ADMIN — SODIUM CHLORIDE, POTASSIUM CHLORIDE, SODIUM LACTATE AND CALCIUM CHLORIDE 1000 ML: 600; 310; 30; 20 INJECTION, SOLUTION INTRAVENOUS at 02:01

## 2024-01-16 RX ADMIN — DEXTROSE MONOHYDRATE 5 MG/HR: 50 INJECTION, SOLUTION INTRAVENOUS at 04:01

## 2024-01-16 RX ADMIN — ONDANSETRON 4 MG: 2 INJECTION INTRAMUSCULAR; INTRAVENOUS at 03:01

## 2024-01-16 RX ADMIN — IOHEXOL 100 ML: 350 INJECTION, SOLUTION INTRAVENOUS at 04:01

## 2024-01-16 RX ADMIN — VANCOMYCIN HYDROCHLORIDE 1750 MG: 500 INJECTION, POWDER, LYOPHILIZED, FOR SOLUTION INTRAVENOUS at 08:01

## 2024-01-16 RX ADMIN — AMIODARONE HYDROCHLORIDE 1 MG/MIN: 1.8 INJECTION, SOLUTION INTRAVENOUS at 06:01

## 2024-01-16 RX ADMIN — DIGOXIN 500 MCG: 0.25 INJECTION INTRAMUSCULAR; INTRAVENOUS at 05:01

## 2024-01-16 RX ADMIN — DILTIAZEM HYDROCHLORIDE 10 MG: 5 INJECTION INTRAVENOUS at 04:01

## 2024-01-16 RX ADMIN — AMIODARONE HYDROCHLORIDE 150 MG: 1.5 INJECTION, SOLUTION INTRAVENOUS at 06:01

## 2024-01-16 RX ADMIN — PIPERACILLIN SODIUM AND TAZOBACTAM SODIUM 3.38 G: 3; .375 INJECTION, POWDER, LYOPHILIZED, FOR SOLUTION INTRAVENOUS at 04:01

## 2024-01-16 RX ADMIN — ACETAMINOPHEN 1000 MG: 500 TABLET ORAL at 04:01

## 2024-01-16 RX ADMIN — METOPROLOL TARTRATE 5 MG: 1 INJECTION, SOLUTION INTRAVENOUS at 05:01

## 2024-01-16 RX ADMIN — MORPHINE SULFATE 4 MG: 4 INJECTION, SOLUTION INTRAMUSCULAR; INTRAVENOUS at 08:01

## 2024-01-16 NOTE — PROGRESS NOTES
Patient ID: Froilan Ray is a 78 y.o. female.    Chief Complaint: Procedure (Follow up clearance dr german at [facility])      Froilan Ray is in the office left hip surgery with Dr. German.    Froilan Ray is a 78-year-old female patient who presents to clinic for preoperative clearance for left hip surgery.  She has a medical history of hypertension, seizures, iron-deficiency anemia, atrial fibrillation, s/p ORIF left hip, heart failure with reduced ejection fraction (50% on echocardiogram 12/22), osteoporosis, and recent repair of strangulated abdominal wall hernia.  She has been on amiodarone and Eliquis for AFib in the past but family states she has not taken this in quite some time. They are not sure how long.   She has not had any chest pain, palpitations or shortness a breath.  Hypertension with elevated blood pressure today of 145/94.  She does not have a cardiologist.  Labs reviewed from 12/08 shows an H&H of 8.5/28.4.  MCV 78.  She has had a previous fracture of her left hip with surgery for repair.  She has had a couple of falls since that time and is now in severe pain.  She is seeing Dr. German who told patient and family that there was a screw that has displaced from previous surgery that needs to be fixed.  She has been primarily bed ridden due to severe pain in her left hip.  Prior to increase in pain she was able to ambulate with a walker with help.  Surgery is not scheduled at this time due to requiring preoperative clearance.  She is followed by Dr. Marcelino with pain management.        Past Medical History:   Diagnosis Date    Anemia     Arthritis     Bleeding ulcer 07/2016    Chronic pain     DDD (degenerative disc disease), cervical     DDD (degenerative disc disease), lumbar     Depression     Disc degeneration, lumbosacral     Diverticulitis     Encounter for blood transfusion     Hypertension     IBS (irritable bowel syndrome)     Neuropathy of both feet     Seizures     none   since 2017    Umbilical hernia 2020              No current facility-administered medications for this visit.    Current Outpatient Medications:     amlodipine-benazepril 5-20 mg (LOTREL) 5-20 mg per capsule, Take 1 capsule by mouth once daily., Disp: 90 capsule, Rfl: 3    apixaban (ELIQUIS) 2.5 mg Tab, Take 1 tablet (2.5 mg total) by mouth 2 (two) times daily., Disp: 60 tablet, Rfl: 1    cyclobenzaprine (FLEXERIL) 10 MG tablet, TAKE 1 TABLET(10 MG) BY MOUTH THREE TIMES DAILY AS NEEDED FOR MUSCLE SPASMS (Patient taking differently: Take 10 mg by mouth 3 (three) times daily as needed for Muscle spasms.), Disp: 90 tablet, Rfl: 2    docusate sodium (COLACE) 100 MG capsule, Take 1 capsule (100 mg total) by mouth 2 (two) times daily., Disp: 60 capsule, Rfl: 1    EScitalopram oxalate (LEXAPRO) 20 MG tablet, TAKE 1 TABLET(20 MG) BY MOUTH EVERY DAY (Patient taking differently: Take 20 mg by mouth every evening.), Disp: 90 tablet, Rfl: 1    fluconazole (DIFLUCAN) 100 MG tablet, Take 1 tablet (100 mg total) by mouth once daily. for 10 days, Disp: 10 tablet, Rfl: 0    omeprazole (PRILOSEC) 40 MG capsule, TAKE 1 CAPSULE(40 MG) BY MOUTH EVERY MORNING (Patient taking differently: Take 40 mg by mouth every morning.), Disp: 90 capsule, Rfl: 2    oxyCODONE-acetaminophen (PERCOCET)  mg per tablet, Take 1 tablet by mouth every 4 to 6 hours as needed for Pain., Disp: , Rfl:     pregabalin (LYRICA) 200 MG Cap, TAKE 1 CAPSULE(200 MG) BY MOUTH THREE TIMES DAILY (Patient taking differently: Take 200 mg by mouth 3 (three) times daily. TAKE 1 CAPSULE(200 MG) BY MOUTH THREE TIMES DAILY), Disp: 90 capsule, Rfl: 2    acetaminophen (TYLENOL) 325 MG tablet, Take 650 mg by mouth every 8 (eight) hours as needed for Pain., Disp: , Rfl:     amiodarone (PACERONE) 200 MG Tab, Take 1 tablet (200 mg total) by mouth once daily. (Patient not taking: Reported on 12/6/2023), Disp: 30 tablet, Rfl: 1    metoprolol tartrate (LOPRESSOR) 50 MG tablet, Take  1 tablet (50 mg total) by mouth 3 (three) times daily. (Patient not taking: Reported on 12/6/2023), Disp: 90 tablet, Rfl: 1    sulfamethoxazole-trimethoprim 800-160mg (BACTRIM DS) 800-160 mg Tab, Take 1 tablet by mouth 2 (two) times daily. for 14 days (Patient not taking: Reported on 1/16/2024), Disp: 28 tablet, Rfl: 0    traMADoL (ULTRAM) 50 mg tablet, Take 2 tablets by mouth every 12 (twelve) hours as needed for Pain., Disp: , Rfl:     Facility-Administered Medications Ordered in Other Visits:     (Magic mouthwash) 1:1:1 diphenhydrAMINE(Benadryl) 12.5mg/5ml liq, aluminum & magnesium hydroxide-simethicone (Maalox), LIDOcaine viscous 2%, 5 mL, Swish & Spit, Q4H PRN, Bimal Fitch MD, 5 mL at 01/17/24 1058    amiodarone 360 mg/200 mL (1.8 mg/mL) infusion, 0.5 mg/min, Intravenous, Continuous, Ethan Izquierdo MD    diltiaZEM 100 mg in dextrose 5% 100 mL IVPB (ready to mix) (titrating), 0-15 mg/hr, Intravenous, Continuous, Ethan Izquierdo MD, Stopped at 01/17/24 0500    melatonin tablet 6 mg, 6 mg, Oral, Nightly PRN, Bimal Fitch MD, 6 mg at 01/17/24 0335    piperacillin-tazobactam 4.5 g in dextrose 5 % 100 mL IVPB (ready to mix), 4.5 g, Intravenous, Q8H, Traci Baker MD, Stopped at 01/17/24 1259    Pharmacy to dose Vancomycin consult, , , Once **AND** vancomycin - pharmacy to dose, , Intravenous, pharmacy to manage frequency, Traci Baker MD    vancomycin 750 mg in dextrose 5 % 250 mL IVPB (ready to mix), 750 mg, Intravenous, Q12H, Ethan Izquierdo MD, Last Rate: 250 mL/hr at 01/17/24 1050, 750 mg at 01/17/24 1050    The ASCVD Risk score (Renate DK, et al., 2019) failed to calculate for the following reasons:    Cannot find a previous HDL lab    Cannot find a previous total cholesterol lab     Wt Readings from Last 3 Encounters:   01/16/24 68 kg (150 lb)   01/03/24 68.5 kg (151 lb)   12/20/23 68.5 kg (151 lb)     Temp Readings from Last 3 Encounters:   01/17/24 98.2 °F (36.8 °C) (Oral)   01/16/24 97.3  °F (36.3 °C)   01/03/24 98.4 °F (36.9 °C)     BP Readings from Last 3 Encounters:   01/17/24 (!) 158/74   01/16/24 136/84   01/03/24 (!) 156/79     Pulse Readings from Last 3 Encounters:   01/17/24 89   01/16/24 89   01/03/24 90     Resp Readings from Last 3 Encounters:   01/17/24 19   01/03/24 16   12/08/23 18     PF Readings from Last 3 Encounters:   No data found for PF     SpO2 Readings from Last 3 Encounters:   01/17/24 98%   01/16/24 95%   12/08/23 95%        Lab Results   Component Value Date    HGBA1C 5.2 12/01/2022     Lab Results   Component Value Date    CREATININE 0.6 01/16/2024       Review of Systems   Constitutional:  Positive for fatigue. Negative for chills and fever.   Respiratory:  Negative for chest tightness and shortness of breath.    Cardiovascular:  Negative for chest pain, palpitations and leg swelling.   Musculoskeletal:  Positive for arthralgias and myalgias.           Objective:      Physical Exam  Vitals reviewed.   Constitutional:       General: She is in acute distress.   Cardiovascular:      Rate and Rhythm: Normal rate and regular rhythm.      Pulses: Normal pulses.      Heart sounds: Normal heart sounds.   Pulmonary:      Effort: Pulmonary effort is normal.      Breath sounds: Normal breath sounds.   Musculoskeletal:      Left hip: Tenderness present. Decreased range of motion. Decreased strength.      Comments: Screams in pain with palpation or movement of her left lower hip or leg.    Neurological:      Mental Status: She is alert.             Screening recommendations appropriate to age and health status were reviewed.    HFrEF (heart failure with reduced ejection fraction)    Essential hypertension    Atrial fibrillation, unspecified type    Iron deficiency anemia, unspecified iron deficiency anemia type    S/P ORIF (open reduction internal fixation) fracture    Chronic gastric ulcer with bleeding    Anticoagulated    Oral thrush    Preoperative clearance      Due to significant  anemia and cardiac history I recommend patient have clearance from Cardiology and Gastroenterology.  Patient is in severe pain and her son does not want to delay surgery for clearance and would like patient to go to ER.  I think this would be wise due to her increasing pain. Also she has not been taking anticoagulant and there is concern for possible infection or blood clot.  Patient was escorted by Soha Garber MA, her , and son to ER for evaluation.

## 2024-01-16 NOTE — ED PROVIDER NOTES
Encounter Date: 1/16/2024       History     Chief Complaint   Patient presents with    Weakness     Generalized X 2weeks    Hip Pain     Left      This is a 78-year-old female presenting for evaluation.  She was accompanied by her son and .  Their main concern is that the patient has uncontrolled left hip pain as well as decreased appetite and oral intake.  They state that she has had chronic left hip pain ever since a left hip fracture and surgical repair a few years ago.  They said she fell a month or so ago in his had increased pain since.  The pain has been worsening despite treatment with tramadol and Percocet.  She has been unable to ambulate for the last week.  She has also had decreased appetite and oral intake the last 2 weeks.  Chart review shows that she was admitted and had surgery to repair an incarcerated inguinal hernia last month.  On review of systems she admits to recent cough and congestion.    The history is provided by the patient, a relative and the spouse.     Review of patient's allergies indicates:  No Known Allergies  Past Medical History:   Diagnosis Date    Anemia     Arthritis     Bleeding ulcer 07/2016    Chronic pain     DDD (degenerative disc disease), cervical     DDD (degenerative disc disease), lumbar     Depression     Disc degeneration, lumbosacral     Diverticulitis     Encounter for blood transfusion     Hypertension     IBS (irritable bowel syndrome)     Neuropathy of both feet     Seizures     none  since 2017    Umbilical hernia 2020     Past Surgical History:   Procedure Laterality Date    BACK SURGERY      BREAST BIOPSY      BREAST SURGERY Right     lumpectomy    CAUDAL EPIDURAL STEROID INJECTION N/A 01/28/2022    Procedure: Injection-steroid-epidural-caudal;  Surgeon: Luzmaria Marcelino MD;  Location: [facility] OR;  Service: Pain Management;  Laterality: N/A;    COLONOSCOPY N/A 01/14/2022    Procedure: COLONOSCOPY;  Surgeon: Abby Landers MD;  Location: [facility];   Service: Endoscopy;  Laterality: N/A;    ENDOSCOPIC ULTRASOUND OF UPPER GASTROINTESTINAL TRACT N/A 07/21/2020    Procedure: ULTRASOUND, UPPER GI TRACT, ENDOSCOPIC;  Surgeon: Marcio gNuyễn III, MD;  Location: [facility] ENDO;  Service: Endoscopy;  Laterality: N/A;    ESOPHAGOGASTRODUODENOSCOPY N/A 01/14/2022    Procedure: EGD (ESOPHAGOGASTRODUODENOSCOPY);  Surgeon: Abby Landers MD;  Location: [facility] ENDO;  Service: Endoscopy;  Laterality: N/A;    ESOPHAGOGASTRODUODENOSCOPY N/A 06/10/2022    Procedure: EGD (ESOPHAGOGASTRODUODENOSCOPY);  Surgeon: Abby Landers MD;  Location: [facility] ENDO;  Service: Endoscopy;  Laterality: N/A;    EYE SURGERY      cataract    HYSTERECTOMY      INTRAMEDULLARY RODDING OF TROCHANTER OF FEMUR Left 12/11/2018    Procedure: INSERTION, INTRAMEDULLARY SANTOS, FEMUR, TROCHANTER;  Surgeon: Eulalio De La Cruz MD;  Location[facility];  Service: Orthopedics;  Laterality: Left;    REPAIR OF EPIGASTRIC HERNIA N/A 12/7/2023    Procedure: REPAIR, HERNIA, EPIGASTRIC;  Surgeon: Vipul Escobar MD;  Location: [facility] OR;  Service: General;  Laterality: N/A;    SPINAL CORD STIMULATOR IMPLANT  09/18/2013    and removal    SPINE SURGERY  2006    lumbar L2-S1 decompression.    SPINE SURGERY      cervical decompression    TONSILLECTOMY       Family History   Problem Relation Age of Onset    Diabetes Mother     Hypertension Mother     Irritable bowel syndrome Mother     Diabetes Father         insulin dependent    Hypertension Father     Heart disease Father     Coronary artery disease Father     Depression Father     Diabetes Sister     Diabetes Brother     COPD Brother      Social History     Tobacco Use    Smoking status: Never    Smokeless tobacco: Never   Substance Use Topics    Alcohol use: No    Drug use: No     Review of Systems   Constitutional:  Positive for appetite change.   HENT:  Positive for congestion.    Respiratory:  Positive for cough.    Musculoskeletal:  Positive for arthralgias and  myalgias.   Neurological:  Positive for weakness.   All other systems reviewed and are negative.      Physical Exam     Initial Vitals [01/16/24 1325]   BP Pulse Resp Temp SpO2   (!) 145/94 (!) 132 (!) 25 99.3 °F (37.4 °C) 100 %      MAP       --         Physical Exam    Nursing note and vitals reviewed.  Constitutional: She appears well-developed. She is not diaphoretic.   Elderly, weak, frail   HENT:   Head: Normocephalic.   Eyes: Conjunctivae are normal.   Neck: Neck supple.   Normal range of motion.  Cardiovascular:            Tachycardic   Pulmonary/Chest: No respiratory distress.   Abdominal: She exhibits no distension. There is abdominal tenderness.   Abdomen is soft with generalized abdominal tenderness to palpation.  Small surgical incision appears well healed. There is no rebound and no guarding.   Musculoskeletal:         General: Tenderness present. No edema.      Cervical back: Normal range of motion and neck supple.      Comments: Diffuse left hip and low back tenderness to palpation.  No erythema.     Neurological: She is alert. She has normal strength.   Skin: Skin is warm and dry.   Psychiatric: She has a normal mood and affect.         ED Course   Critical Care    Date/Time: 1/16/2024 7:33 PM    Performed by: Ethan Izquierdo MD  Authorized by: Ethan Izquierdo MD  Direct patient critical care time: 80 minutes  Ordering / reviewing critical care time: 10 minutes  Documentation critical care time: 8 minutes  Consulting other physicians critical care time: 10 minutes  Consult with family critical care time: 5 minutes  Total critical care time (exclusive of procedural time) : 113 minutes  Critical care was necessary to treat or prevent imminent or life-threatening deterioration of the following conditions: sepsis and circulatory failure.  Critical care was time spent personally by me on the following activities: development of treatment plan with patient or surrogate, discussions with consultants,  evaluation of patient's response to treatment, examination of patient, obtaining history from patient or surrogate, ordering and performing treatments and interventions, ordering and review of laboratory studies, ordering and review of radiographic studies, pulse oximetry, re-evaluation of patient's condition and review of old charts.        Labs Reviewed   CBC W/ AUTO DIFFERENTIAL - Abnormal; Notable for the following components:       Result Value    WBC 28.57 (*)     RBC 3.97 (*)     Hemoglobin 9.4 (*)     Hematocrit 29.8 (*)     MCV 75 (*)     MCH 23.7 (*)     MCHC 31.5 (*)     RDW 16.0 (*)     Platelets 698 (*)     Immature Granulocytes 3.4 (*)     Gran # (ANC) 25.4 (*)     Immature Grans (Abs) 0.96 (*)     Lymph # 0.8 (*)     Mono # 1.3 (*)     Gran % 88.8 (*)     Lymph % 2.8 (*)     Platelet Estimate Increased (*)     All other components within normal limits   COMPREHENSIVE METABOLIC PANEL - Abnormal; Notable for the following components:    Sodium 128 (*)     Chloride 92 (*)     Albumin 3.0 (*)     Total Bilirubin 1.2 (*)     Alkaline Phosphatase 243 (*)      (*)      (*)     All other components within normal limits   LACTIC ACID, PLASMA - Abnormal; Notable for the following components:    Lactate (Lactic Acid) 2.2 (*)     All other components within normal limits   URINALYSIS, REFLEX TO URINE CULTURE - Abnormal; Notable for the following components:    Appearance, UA Hazy (*)     Protein, UA 1+ (*)     Occult Blood UA 1+ (*)     Urobilinogen, UA 2.0-3.0 (*)     Leukocytes, UA 3+ (*)     All other components within normal limits    Narrative:     Specimen Source->Urine   B-TYPE NATRIURETIC PEPTIDE - Abnormal; Notable for the following components:     (*)     All other components within normal limits   TROPONIN I HIGH SENSITIVITY - Abnormal; Notable for the following components:    Troponin I High Sensitivity 21.1 (*)     All other components within normal limits   PROCALCITONIN -  Abnormal; Notable for the following components:    Procalcitonin 5.304 (*)     All other components within normal limits   SEDIMENTATION RATE - Abnormal; Notable for the following components:    Sed Rate >90 (*)     All other components within normal limits   URINALYSIS MICROSCOPIC - Abnormal; Notable for the following components:    RBC, UA 10 (*)     WBC, UA >100 (*)     Bacteria Few (*)     Hyaline Casts, UA 2 (*)     All other components within normal limits    Narrative:     Specimen Source->Urine   CULTURE, BLOOD   CULTURE, BLOOD   CULTURE, URINE   INFLUENZA A AND B ANTIGEN    Narrative:     Specimen Source->Nasopharyngeal Swab   SARS-COV-2 RNA AMPLIFICATION, QUAL   C-REACTIVE PROTEIN   LACTIC ACID, PLASMA   ISTAT CREATININE   POCT CREATININE     EKG Readings: (Independently Interpreted)   Sinus tachycardic.  One hundred thirty-two beats/minute.  Normal axis.  No ST elevation.     ECG Results              EKG 12-lead (In process)  Result time 01/16/24 16:30:02      In process by Interface, Lab In [facility] (01/16/24 16:30:02)                   Narrative:    Test Reason : I48.91,    Vent. Rate : 158 BPM     Atrial Rate : 159 BPM     P-R Int : 000 ms          QRS Dur : 080 ms      QT Int : 282 ms       P-R-T Axes : 000 028 201 degrees     QTc Int : 457 ms    Atrial fibrillation with rapid ventricular response  Nonspecific ST and T wave abnormality  Abnormal ECG  When compared with ECG of 16-JAN-2024 13:34,  Atrial fibrillation has replaced Sinus rhythm  ST now depressed in Inferior leads  Nonspecific T wave abnormality now evident in Inferior leads  Inverted T waves have replaced nonspecific T wave abnormality in Lateral  leads    Referred By: [email or web address]   SELF           Confirmed By:                                      EKG 12-lead (In process)  Result time 01/16/24 14:28:14      In process by Interface, Lab In [facility] (01/16/24 14:28:14)                   Narrative:    Test Reason : R00.0,    Vent. Rate : 132 BPM      Atrial Rate : 132 BPM     P-R Int : 134 ms          QRS Dur : 078 ms      QT Int : 298 ms       P-R-T Axes : 070 037 085 degrees     QTc Int : 441 ms    Sinus tachycardia with Premature atrial complexes  Possible Left atrial enlargement  Borderline Abnormal ECG  When compared with ECG of 06-DEC-2023 14:49,  Premature atrial complexes are now Present    Referred By: [email or web address]   SELF           Confirmed By:                                   Imaging Results              CT Abdomen Pelvis With IV Contrast NO Oral Contrast (Final result)  Result time 01/16/24 16:43:58      Final result by Richard Bernard MD (01/16/24 16:43:58)                   Narrative:    CMS MANDATED QUALITY DATA - CT RADIATION - 436    One or more of the following dose-optimizing techniques was utilized for this exam: automated exposure control, adjustment of the mA and/or kV according to patient size, and/or use of iterative reconstruction technique.      HISTORY:Abdominal pain, acute, nonlocalized.    TECHNIQUE: Serial axial images were obtained from the domes of the diaphragm to the pubic symphysis with 100ccs Omnipaque 350 intravenous contrast. Oral contrast was not administered for this exam. Coronal and sagittal reconstructions were performed. Patient received 528 mGy-cm of radiation exposure from this exam.    COMPARISON: Comparison to previous study dated December 6, 2023.    FINDINGS:  Lung bases: Stable appearing 3 mm noncalcified pulmonary nodule is noted in the peripheral aspect of the right lung base. Calcified granuloma is noted in the left lung base. No basilar infiltrate or pleural effusion is seen.    Liver: Liver appears normal in size. Scattered subcentimeter hepatic low densities are present most consistent with tiny cysts without significant interval change. Gallbladder appears nondistended.    Pancreas: Pancreas is unremarkable.    Spleen: Normal.    Adrenal glands: Mild thickening of the adrenal glands is noted without  significant interval change.    Kidneys and ureters: Bilateral functioning kidneys are visualized. No hydronephrosis is seen. Nonobstructing 5 mm left lower pole intrarenal calculi is noted. Ureters appear nondilated.    Bladder: Bladder is unremarkable. Small calcified phleboliths are noted in the pelvis.    Bowel: Small hiatal hernia is present. Stomach and small bowel appear nondistended. Moderate volume of colonic fecal material is present. No imaging evidence of appendiceal inflammation is seen. Mild bowel wall thickening with minimal pericolonic inflammatory changes of the ascending colon is present suspicious for mild colitis. Consider follow-up colonoscopy. No evidence of bowel obstruction is seen.    Peritoneum: No free air or free fluid is seen.    Retroperitoneum: Unremarkable.    Lymph nodes: No evidence of abdominal or pelvic adenopathy is seen.    Abdominal wall: Peripherally calcified 1.6 cm mass of the right breast is again noted unchanged. Mild skin thickening is again visualized in the periumbilical region similar to previous exam.    Bones: Postop changes of the left hip is visualized with severe degenerative changes of the left hip joint with marked joint space narrowing. The left hip nail traverses the femoral head and extends into the acetabular roof. Interval development of multiple fluid collections are noted superior to the left hip joint with hyperdense layering fluid within these fluid collection. Fluid collections collectively measure at least 9 x 4 cm in size. Severe degenerative changes of the lumbar spine are again visualized with levoscoliosis noted. Old bilateral fracture deformity of the pubis is noted.    IMPRESSION:  1. Mild bowel wall thickening and pericolonic inflammatory changes of the ascending colon suspicious for mild colitis. Consider follow-up colonoscopy.  2. Small hiatal hernia.  3. Postoperative changes of the left hip joint with severe degenerative changes noted. The  left hip nail traverses the femoral head and extends into the left acetabular roof. Interval development of multiple fluid collections surrounding the left hip joint is noted measuring up to 9 cm in diameter with layering hyperdense fluid. These may represent multiple synovial cysts, hemorrhage, or even abscess. Minimal air is noted in the left hip joint. Recommend correlation with patient's clinical and treatment history regarding the left hip.  4. Severe levoscoliosis and degenerative changes of the lumbar spine.  5. Nonobstructing left lower pole intrarenal calculi.        Electronically signed by:  Richard Bernard MD  01/16/2024 04:43 PM CST Workstation: [id number]                                     X-Ray Chest AP Portable (Final result)  Result time 01/16/24 15:12:10      Final result by Bryan Stevens MD (01/16/24 15:12:10)                   Narrative:    Reason: Sepsis Weakness (Generalized X 2weeks); Hip Pain (Left )    FINDINGS:  Portable chest at 1451 compared to 12/6/2023 shows normal cardiomediastinal silhouette.    Lungs are clear. Pulmonary vasculature is normal. Chronic right rotator cuff deficiency evident degenerative changes of the right glenohumeral joint. No acute osseous abnormality.    IMPRESSION:  No acute cardiopulmonary abnormality.    Electronically signed by:  Bryan Stevens MD  01/16/2024 03:12 PM CST Workstation: [id number]                                     X-Ray Hip 2 or 3 views Left (with Pelvis when performed) (Final result)  Result time 01/16/24 15:14:50      Final result by Shahid Wellington IV, MD (01/16/24 15:14:50)                   Narrative:    Pelvis and left hip, 3 views    HISTORY: Hip pain.    Comparison is made to previous examinations including the study of 11/19/2023.    There has been previous placement of intramedullary monika within the proximal left femur for fixation of left femoral fracture. The component extending through the femoral head appears to extend beyond the  superior cortex. There is deformity of the left femoral head which appears greater than on the previous examination. This is associated with narrowing of the joint space.    The right hip joint appears appropriately maintained.    There are multilevel degenerative disc and facet changes within the lumbar spine associated with leftward curvature.    There is deformity of the left superior and inferior pubic rami and right pubic bone compatible with remote healed fractures.    IMPRESSION:    Apparent progression of deformity of the left femoral head associated with superior joint space narrowing. There appears to be extension of the fixating hardware beyond the expected cortex of the left femoral head. Consider further investigation with CT.    Healed pelvic fractures.    Multilevel degenerative changes within the spine associated with leftward curvature.    Electronically signed by:  Shahid Wellington MD  01/16/2024 03:14 PM CST Workstation: [id number]                                     Medications   piperacillin-tazobactam 3.375 g in dextrose 5 % 100 mL IVPB (ready to mix) (0 g Intravenous Paused 1/16/24 1808)   vancomycin - pharmacy to dose (has no administration in time range)   vancomycin (VANCOCIN) 1,750 mg in dextrose 5 % (D5W) 500 mL IVPB (has no administration in time range)   diltiaZEM 100 mg in dextrose 5% 100 mL IVPB (ready to mix) (titrating) (10 mg/hr Intravenous Rate/Dose Change 1/16/24 1730)   amiodarone 360 mg/200 mL (1.8 mg/mL) infusion (1 mg/min Intravenous New Bag 1/16/24 1828)   amiodarone 360 mg/200 mL (1.8 mg/mL) infusion (has no administration in time range)   lactated ringers bolus 1,779 mL (0 mLs Intravenous Stopped 1/16/24 1555)   morphine injection 4 mg (4 mg Intravenous Given 1/16/24 1504)   ondansetron injection 4 mg (4 mg Intravenous Given 1/16/24 1505)   iohexoL (OMNIPAQUE 350) injection 100 mL (100 mLs Intravenous Given 1/16/24 1618)   morphine injection 4 mg (4 mg Intravenous Given  1/16/24 1532)   acetaminophen tablet 1,000 mg (1,000 mg Oral Given 1/16/24 1636)   diltiaZEM injection 10 mg (10 mg Intravenous Given 1/16/24 1626)   diltiaZEM injection 10 mg (10 mg Intravenous Given 1/16/24 1632)   metoprolol injection 5 mg (5 mg Intravenous Given 1/16/24 1705)   metoprolol injection 5 mg (5 mg Intravenous Given 1/16/24 1755)   digoxin injection 500 mcg (500 mcg Intravenous Given 1/16/24 1759)   amiodarone in dextrose 150 mg/100 mL (1.5 mg/mL) loading dose 150 mg (0 mg Intravenous Stopped 1/16/24 1817)     Medical Decision Making  78-year-old male with 2 weeks of worsening left hip pain.  Left hip is very tender to palpation, tachycardic in the 130s.  Normotensive.  EKG shows sinus rhythm.  Patient appears septic.  She has a marked leukocytosis in the 20s.  Procalcitonin is elevated.  Lactic acid mildly elevated.  Large volume IV fluid bolus given based on her ideal body weight.  IV vancomycin and Zosyn ordered.  LFTs mildly elevated.  CT abdomen obtained, shows left hip fluid collection and intra-articular air.  Will require orthopedic intervention.    Amount and/or Complexity of Data Reviewed  Labs: ordered.  Radiology: ordered.    Risk  OTC drugs.  Prescription drug management.               ED Course as of 01/16/24 1936 Tue Jan 16, 2024 1831 Patient developed AFib with RVR, heart rate up into the 200s.  She remained stable, alert, asymptomatic, mildly hypotensive.  She was treated with 2 doses IV Cardizem bolus and Cardizem drip, mild improvement with rates into the 160s-180s.  She then received 2 doses of IV metoprolol.  Heart rate still in the 160s.  Clinically she remains stable.  I then discussed with Cardiology, Dr. Correa, who recommended a dose IV digoxin as well as loading amiodarone bolus and starting an amiodarone drip.  Says he will likely to cardiovert her tomorrow if her rate remains uncontrolled, and to call him back if she becomes unstable. [BA]   1833 I initially discussed  with ortho, Dr. German, about the patient's septic left hip.  He says that since not operated on her she should go to on referred Orthopedics.  I then discussed with and referred ortho, Dr. Vera, who advised her case is beyond the scope of this facility and she would need transfer to [facility]. [BA]   1910 I discussed with Dr. Hernandez via the transfer center who accepts the patient to [facility]. [BA]      ED Course User Index  [BA] Ethan Izquierdo MD                           Clinical Impression:  Final diagnoses:  [R00.0] Tachycardia  [M25.552] Left hip pain  [I48.91] Atrial fibrillation with rapid ventricular response  [M00.9] Pyogenic arthritis of left hip, due to unspecified organism (Primary)          ED Disposition Condition    Transfer to Another Facility Stable                Ethan Izquierdo MD  01/16/24 1936

## 2024-01-17 ENCOUNTER — HOSPITAL ENCOUNTER (INPATIENT)
Facility: HOSPITAL | Age: 79
LOS: 12 days | Discharge: HOME HEALTH | DRG: 480 | End: 2024-01-29
Attending: INTERNAL MEDICINE | Admitting: STUDENT IN AN ORGANIZED HEALTH CARE EDUCATION/TRAINING PROGRAM
Payer: MEDICARE

## 2024-01-17 VITALS
WEIGHT: 150 LBS | SYSTOLIC BLOOD PRESSURE: 141 MMHG | TEMPERATURE: 98 F | DIASTOLIC BLOOD PRESSURE: 75 MMHG | HEIGHT: 66 IN | BODY MASS INDEX: 24.11 KG/M2 | HEART RATE: 97 BPM | RESPIRATION RATE: 17 BRPM | OXYGEN SATURATION: 98 %

## 2024-01-17 DIAGNOSIS — B95.62 MRSA BACTEREMIA: ICD-10-CM

## 2024-01-17 DIAGNOSIS — R78.81 MRSA BACTEREMIA: ICD-10-CM

## 2024-01-17 DIAGNOSIS — M51.36 DDD (DEGENERATIVE DISC DISEASE), LUMBAR: ICD-10-CM

## 2024-01-17 DIAGNOSIS — R07.9 CHEST PAIN: ICD-10-CM

## 2024-01-17 DIAGNOSIS — I48.91 ATRIAL FIBRILLATION: ICD-10-CM

## 2024-01-17 DIAGNOSIS — R33.9 URINARY RETENTION: ICD-10-CM

## 2024-01-17 DIAGNOSIS — M47.896 OTHER SPONDYLOSIS, LUMBAR REGION: ICD-10-CM

## 2024-01-17 DIAGNOSIS — R78.81 BACTEREMIA: ICD-10-CM

## 2024-01-17 DIAGNOSIS — M54.16 LUMBAR RADICULOPATHY: ICD-10-CM

## 2024-01-17 DIAGNOSIS — T84.84XA PAINFUL ORTHOPAEDIC HARDWARE: ICD-10-CM

## 2024-01-17 DIAGNOSIS — M00.052 STAPHYLOCOCCAL ARTHRITIS OF LEFT HIP: Primary | ICD-10-CM

## 2024-01-17 DIAGNOSIS — M00.9 SEPTIC ARTHRITIS: ICD-10-CM

## 2024-01-17 DIAGNOSIS — M96.1 FAILED BACK SYNDROME OF LUMBAR SPINE: ICD-10-CM

## 2024-01-17 PROBLEM — I10 ESSENTIAL HYPERTENSION: Chronic | Status: ACTIVE | Noted: 2018-12-11

## 2024-01-17 PROBLEM — G62.9 PERIPHERAL NEUROPATHY: Chronic | Status: ACTIVE | Noted: 2018-12-11

## 2024-01-17 PROBLEM — F32.A DEPRESSION: Chronic | Status: ACTIVE | Noted: 2022-06-30

## 2024-01-17 PROBLEM — I50.30 (HFPEF) HEART FAILURE WITH PRESERVED EJECTION FRACTION: Chronic | Status: ACTIVE | Noted: 2024-01-16

## 2024-01-17 PROBLEM — D50.9 IRON DEFICIENCY ANEMIA: Chronic | Status: ACTIVE | Noted: 2022-05-18

## 2024-01-17 LAB
ACINETOBACTER CALCOACETICUS/BAUMANNII COMPLEX: NOT DETECTED
ALBUMIN SERPL BCP-MCNC: 2 G/DL (ref 3.5–5.2)
ALP SERPL-CCNC: 287 U/L (ref 55–135)
ALT SERPL W/O P-5'-P-CCNC: 105 U/L (ref 10–44)
ANION GAP SERPL CALC-SCNC: 13 MMOL/L (ref 8–16)
ANISOCYTOSIS BLD QL SMEAR: SLIGHT
AST SERPL-CCNC: 90 U/L (ref 10–40)
BACTEROIDES FRAGILIS: NOT DETECTED
BASOPHILS # BLD AUTO: 0.05 K/UL (ref 0–0.2)
BASOPHILS # BLD AUTO: 0.05 K/UL (ref 0–0.2)
BASOPHILS NFR BLD: 0.2 % (ref 0–1.9)
BASOPHILS NFR BLD: 0.2 % (ref 0–1.9)
BILIRUB SERPL-MCNC: 1.2 MG/DL (ref 0.1–1)
BUN SERPL-MCNC: 14 MG/DL (ref 8–23)
CALCIUM SERPL-MCNC: 9.1 MG/DL (ref 8.7–10.5)
CANDIDA ALBICANS: NOT DETECTED
CANDIDA AURIS: NOT DETECTED
CANDIDA GLABRATA: NOT DETECTED
CANDIDA KRUSEI: NOT DETECTED
CANDIDA PARAPSILOSIS: NOT DETECTED
CANDIDA TROPICALIS: NOT DETECTED
CHLORIDE SERPL-SCNC: 97 MMOL/L (ref 95–110)
CO2 SERPL-SCNC: 23 MMOL/L (ref 23–29)
CREAT SERPL-MCNC: 1 MG/DL (ref 0.5–1.4)
CRP SERPL-MCNC: 266.7 MG/L (ref 0–8.2)
CRYPTOCOCCUS NEOFORMANS/GATTII: NOT DETECTED
CTX-M GENE (ESBL PRODUCER): ABNORMAL
DACRYOCYTES BLD QL SMEAR: ABNORMAL
DIFFERENTIAL METHOD BLD: ABNORMAL
DIFFERENTIAL METHOD BLD: ABNORMAL
ENTEROBACTER CLOACAE COMPLEX: NOT DETECTED
ENTEROBACTERALES: NOT DETECTED
ENTEROCOCCUS FAECALIS: NOT DETECTED
ENTEROCOCCUS FAECIUM: NOT DETECTED
EOSINOPHIL # BLD AUTO: 0 K/UL (ref 0–0.5)
EOSINOPHIL # BLD AUTO: 0 K/UL (ref 0–0.5)
EOSINOPHIL NFR BLD: 0 % (ref 0–8)
EOSINOPHIL NFR BLD: 0 % (ref 0–8)
ERYTHROCYTE [DISTWIDTH] IN BLOOD BY AUTOMATED COUNT: 16.1 % (ref 11.5–14.5)
ERYTHROCYTE [DISTWIDTH] IN BLOOD BY AUTOMATED COUNT: 16.4 % (ref 11.5–14.5)
ESCHERICHIA COLI: NOT DETECTED
EST. GFR  (NO RACE VARIABLE): 58.4 ML/MIN/1.73 M^2
GIANT PLATELETS BLD QL SMEAR: PRESENT
GLUCOSE SERPL-MCNC: 102 MG/DL (ref 70–110)
HAEMOPHILUS INFLUENZAE: NOT DETECTED
HCT VFR BLD AUTO: 23.9 % (ref 37–48.5)
HCT VFR BLD AUTO: 26.9 % (ref 37–48.5)
HGB BLD-MCNC: 7.6 G/DL (ref 12–16)
HGB BLD-MCNC: 8.4 G/DL (ref 12–16)
HYPOCHROMIA BLD QL SMEAR: ABNORMAL
IMM GRANULOCYTES # BLD AUTO: 0.43 K/UL (ref 0–0.04)
IMM GRANULOCYTES # BLD AUTO: 0.49 K/UL (ref 0–0.04)
IMM GRANULOCYTES NFR BLD AUTO: 1.7 % (ref 0–0.5)
IMM GRANULOCYTES NFR BLD AUTO: 1.8 % (ref 0–0.5)
IMP GENE (CARBAPENEM RESISTANT): ABNORMAL
KLEBSIELLA AEROGENES: NOT DETECTED
KLEBSIELLA OXYTOCA: NOT DETECTED
KLEBSIELLA PNEUMONIAE GROUP: NOT DETECTED
KPC RESISTANCE GENE (CARBAPENEM): ABNORMAL
LISTERIA MONOCYTOGENES: NOT DETECTED
LYMPHOCYTES # BLD AUTO: 0.7 K/UL (ref 1–4.8)
LYMPHOCYTES # BLD AUTO: 1 K/UL (ref 1–4.8)
LYMPHOCYTES NFR BLD: 2.7 % (ref 18–48)
LYMPHOCYTES NFR BLD: 3.9 % (ref 18–48)
MCH RBC QN AUTO: 23.5 PG (ref 27–31)
MCH RBC QN AUTO: 23.8 PG (ref 27–31)
MCHC RBC AUTO-ENTMCNC: 31.2 G/DL (ref 32–36)
MCHC RBC AUTO-ENTMCNC: 31.8 G/DL (ref 32–36)
MCR-1: ABNORMAL
MCV RBC AUTO: 75 FL (ref 82–98)
MCV RBC AUTO: 75 FL (ref 82–98)
MEC A/C AND MREJ (MRSA): DETECTED
MEC A/C: ABNORMAL
MONOCYTES # BLD AUTO: 1.1 K/UL (ref 0.3–1)
MONOCYTES # BLD AUTO: 1.3 K/UL (ref 0.3–1)
MONOCYTES NFR BLD: 4.2 % (ref 4–15)
MONOCYTES NFR BLD: 5.2 % (ref 4–15)
NDM GENE (CARBAPENEM RESISTANT): ABNORMAL
NEISSERIA MENINGITIDIS: NOT DETECTED
NEUTROPHILS # BLD AUTO: 22.6 K/UL (ref 1.8–7.7)
NEUTROPHILS # BLD AUTO: 24.5 K/UL (ref 1.8–7.7)
NEUTROPHILS NFR BLD: 89 % (ref 38–73)
NEUTROPHILS NFR BLD: 91.1 % (ref 38–73)
NRBC BLD-RTO: 0 /100 WBC
NRBC BLD-RTO: 0 /100 WBC
OVALOCYTES BLD QL SMEAR: ABNORMAL
OXA-48-LIKE (CARBAPENEM RESISTANT): ABNORMAL
PLATELET # BLD AUTO: 635 K/UL (ref 150–450)
PLATELET # BLD AUTO: 756 K/UL (ref 150–450)
PLATELET BLD QL SMEAR: ABNORMAL
PMV BLD AUTO: 9.3 FL (ref 9.2–12.9)
PMV BLD AUTO: 9.8 FL (ref 9.2–12.9)
POIKILOCYTOSIS BLD QL SMEAR: SLIGHT
POLYCHROMASIA BLD QL SMEAR: ABNORMAL
POTASSIUM SERPL-SCNC: 3.6 MMOL/L (ref 3.5–5.1)
PROT SERPL-MCNC: 7.1 G/DL (ref 6–8.4)
PROTEUS SPECIES: NOT DETECTED
PSEUDOMONAS AERUGINOSA: NOT DETECTED
RBC # BLD AUTO: 3.2 M/UL (ref 4–5.4)
RBC # BLD AUTO: 3.57 M/UL (ref 4–5.4)
SALMONELLA SP: NOT DETECTED
SERRATIA MARCESCENS: NOT DETECTED
SODIUM SERPL-SCNC: 133 MMOL/L (ref 136–145)
STAPHYLOCOCCUS AUREUS: DETECTED
STAPHYLOCOCCUS EPIDERMIDIS: NOT DETECTED
STAPHYLOCOCCUS LUGDUNESIS: NOT DETECTED
STAPHYLOCOCCUS SPECIES: ABNORMAL
STENOTROPHOMONAS MALTOPHILIA: NOT DETECTED
STREPTOCOCCUS AGALACTIAE: NOT DETECTED
STREPTOCOCCUS PNEUMONIAE: NOT DETECTED
STREPTOCOCCUS PYOGENES: NOT DETECTED
STREPTOCOCCUS SPECIES: NOT DETECTED
TARGETS BLD QL SMEAR: ABNORMAL
VAN A/B (VRE GENE): ABNORMAL
VIM GENE (CARBAPENEM RESISTANT): ABNORMAL
WBC # BLD AUTO: 25.34 K/UL (ref 3.9–12.7)
WBC # BLD AUTO: 26.97 K/UL (ref 3.9–12.7)
WBC TOXIC VACUOLES BLD QL SMEAR: PRESENT

## 2024-01-17 PROCEDURE — 63600175 PHARM REV CODE 636 W HCPCS: Performed by: HOSPITALIST

## 2024-01-17 PROCEDURE — 96365 THER/PROPH/DIAG IV INF INIT: CPT | Mod: 59

## 2024-01-17 PROCEDURE — 63600175 PHARM REV CODE 636 W HCPCS: Performed by: EMERGENCY MEDICINE

## 2024-01-17 PROCEDURE — 93005 ELECTROCARDIOGRAM TRACING: CPT | Performed by: INTERNAL MEDICINE

## 2024-01-17 PROCEDURE — 96376 TX/PRO/DX INJ SAME DRUG ADON: CPT

## 2024-01-17 PROCEDURE — 99284 EMERGENCY DEPT VISIT MOD MDM: CPT | Mod: ,,, | Performed by: INTERNAL MEDICINE

## 2024-01-17 PROCEDURE — 96366 THER/PROPH/DIAG IV INF ADDON: CPT

## 2024-01-17 PROCEDURE — 96375 TX/PRO/DX INJ NEW DRUG ADDON: CPT

## 2024-01-17 PROCEDURE — 36415 COLL VENOUS BLD VENIPUNCTURE: CPT | Performed by: STUDENT IN AN ORGANIZED HEALTH CARE EDUCATION/TRAINING PROGRAM

## 2024-01-17 PROCEDURE — 20600001 HC STEP DOWN PRIVATE ROOM

## 2024-01-17 PROCEDURE — 93010 ELECTROCARDIOGRAM REPORT: CPT | Mod: ,,, | Performed by: INTERNAL MEDICINE

## 2024-01-17 PROCEDURE — 25000003 PHARM REV CODE 250: Performed by: HOSPITALIST

## 2024-01-17 PROCEDURE — 80053 COMPREHEN METABOLIC PANEL: CPT | Performed by: STUDENT IN AN ORGANIZED HEALTH CARE EDUCATION/TRAINING PROGRAM

## 2024-01-17 PROCEDURE — 25000003 PHARM REV CODE 250: Performed by: EMERGENCY MEDICINE

## 2024-01-17 PROCEDURE — 85025 COMPLETE CBC W/AUTO DIFF WBC: CPT | Mod: 91

## 2024-01-17 PROCEDURE — 63600175 PHARM REV CODE 636 W HCPCS

## 2024-01-17 PROCEDURE — 85025 COMPLETE CBC W/AUTO DIFF WBC: CPT | Performed by: STUDENT IN AN ORGANIZED HEALTH CARE EDUCATION/TRAINING PROGRAM

## 2024-01-17 PROCEDURE — 25000003 PHARM REV CODE 250

## 2024-01-17 PROCEDURE — 96368 THER/DIAG CONCURRENT INF: CPT

## 2024-01-17 PROCEDURE — 25000003 PHARM REV CODE 250: Performed by: STUDENT IN AN ORGANIZED HEALTH CARE EDUCATION/TRAINING PROGRAM

## 2024-01-17 RX ORDER — POLYETHYLENE GLYCOL 3350 17 G/17G
17 POWDER, FOR SOLUTION ORAL DAILY PRN
Status: DISCONTINUED | OUTPATIENT
Start: 2024-01-17 | End: 2024-01-29 | Stop reason: HOSPADM

## 2024-01-17 RX ORDER — SODIUM CHLORIDE 0.9 % (FLUSH) 0.9 %
10 SYRINGE (ML) INJECTION
Status: DISCONTINUED | OUTPATIENT
Start: 2024-01-17 | End: 2024-01-17 | Stop reason: HOSPADM

## 2024-01-17 RX ORDER — TALC
6 POWDER (GRAM) TOPICAL NIGHTLY PRN
Status: DISCONTINUED | OUTPATIENT
Start: 2024-01-17 | End: 2024-01-17 | Stop reason: HOSPADM

## 2024-01-17 RX ORDER — HYDROMORPHONE HYDROCHLORIDE 1 MG/ML
0.5 INJECTION, SOLUTION INTRAMUSCULAR; INTRAVENOUS; SUBCUTANEOUS
Status: COMPLETED | OUTPATIENT
Start: 2024-01-17 | End: 2024-01-17

## 2024-01-17 RX ORDER — ACETAMINOPHEN 325 MG/1
650 TABLET ORAL EVERY 4 HOURS PRN
Status: DISCONTINUED | OUTPATIENT
Start: 2024-01-17 | End: 2024-01-17 | Stop reason: HOSPADM

## 2024-01-17 RX ORDER — AMIODARONE HYDROCHLORIDE 200 MG/1
200 TABLET ORAL DAILY
Status: DISCONTINUED | OUTPATIENT
Start: 2024-01-18 | End: 2024-01-29 | Stop reason: HOSPADM

## 2024-01-17 RX ORDER — ACETAMINOPHEN 325 MG/1
650 TABLET ORAL EVERY 8 HOURS PRN
Status: DISCONTINUED | OUTPATIENT
Start: 2024-01-17 | End: 2024-01-18

## 2024-01-17 RX ORDER — NYSTATIN 100000 [USP'U]/ML
500000 SUSPENSION ORAL 4 TIMES DAILY
Status: DISCONTINUED | OUTPATIENT
Start: 2024-01-17 | End: 2024-01-17 | Stop reason: HOSPADM

## 2024-01-17 RX ORDER — METOPROLOL TARTRATE 50 MG/1
50 TABLET ORAL 2 TIMES DAILY
Status: DISCONTINUED | OUTPATIENT
Start: 2024-01-17 | End: 2024-01-29 | Stop reason: HOSPADM

## 2024-01-17 RX ORDER — ESCITALOPRAM OXALATE 5 MG/1
20 TABLET ORAL DAILY
Status: DISCONTINUED | OUTPATIENT
Start: 2024-01-18 | End: 2024-01-29 | Stop reason: HOSPADM

## 2024-01-17 RX ORDER — OXYCODONE AND ACETAMINOPHEN 10; 325 MG/1; MG/1
1 TABLET ORAL EVERY 4 HOURS PRN
Status: DISCONTINUED | OUTPATIENT
Start: 2024-01-17 | End: 2024-01-18

## 2024-01-17 RX ORDER — SODIUM CHLORIDE 0.9 % (FLUSH) 0.9 %
1-10 SYRINGE (ML) INJECTION EVERY 12 HOURS PRN
Status: DISCONTINUED | OUTPATIENT
Start: 2024-01-17 | End: 2024-01-29 | Stop reason: HOSPADM

## 2024-01-17 RX ORDER — OXYCODONE AND ACETAMINOPHEN 10; 325 MG/1; MG/1
1 TABLET ORAL EVERY 6 HOURS PRN
Status: DISCONTINUED | OUTPATIENT
Start: 2024-01-17 | End: 2024-01-17

## 2024-01-17 RX ORDER — SIMETHICONE 80 MG
1 TABLET,CHEWABLE ORAL 4 TIMES DAILY PRN
Status: DISCONTINUED | OUTPATIENT
Start: 2024-01-17 | End: 2024-01-29 | Stop reason: HOSPADM

## 2024-01-17 RX ORDER — TALC
6 POWDER (GRAM) TOPICAL NIGHTLY PRN
Status: DISCONTINUED | OUTPATIENT
Start: 2024-01-17 | End: 2024-01-29 | Stop reason: HOSPADM

## 2024-01-17 RX ORDER — NALOXONE HCL 0.4 MG/ML
0.02 VIAL (ML) INJECTION
Status: DISCONTINUED | OUTPATIENT
Start: 2024-01-17 | End: 2024-01-29 | Stop reason: HOSPADM

## 2024-01-17 RX ORDER — AMLODIPINE BESYLATE 5 MG/1
5 TABLET ORAL DAILY
Status: DISCONTINUED | OUTPATIENT
Start: 2024-01-18 | End: 2024-01-29 | Stop reason: HOSPADM

## 2024-01-17 RX ORDER — ONDANSETRON 8 MG/1
8 TABLET, ORALLY DISINTEGRATING ORAL EVERY 8 HOURS PRN
Status: DISCONTINUED | OUTPATIENT
Start: 2024-01-17 | End: 2024-01-29 | Stop reason: HOSPADM

## 2024-01-17 RX ORDER — NYSTATIN 100000 [USP'U]/ML
500000 SUSPENSION ORAL 4 TIMES DAILY
Status: DISCONTINUED | OUTPATIENT
Start: 2024-01-17 | End: 2024-01-29 | Stop reason: HOSPADM

## 2024-01-17 RX ORDER — MUPIROCIN 20 MG/G
OINTMENT TOPICAL 2 TIMES DAILY
Status: DISPENSED | OUTPATIENT
Start: 2024-01-17 | End: 2024-01-22

## 2024-01-17 RX ORDER — ONDANSETRON HYDROCHLORIDE 2 MG/ML
4 INJECTION, SOLUTION INTRAVENOUS
Status: COMPLETED | OUTPATIENT
Start: 2024-01-17 | End: 2024-01-17

## 2024-01-17 RX ORDER — MORPHINE SULFATE 4 MG/ML
4 INJECTION, SOLUTION INTRAMUSCULAR; INTRAVENOUS
Status: COMPLETED | OUTPATIENT
Start: 2024-01-17 | End: 2024-01-17

## 2024-01-17 RX ORDER — ACETAMINOPHEN 325 MG/1
650 TABLET ORAL EVERY 4 HOURS PRN
Status: DISCONTINUED | OUTPATIENT
Start: 2024-01-17 | End: 2024-01-18

## 2024-01-17 RX ORDER — NYSTATIN 100000 [USP'U]/ML
500000 SUSPENSION ORAL ONCE
Status: DISCONTINUED | OUTPATIENT
Start: 2024-01-17 | End: 2024-01-17

## 2024-01-17 RX ORDER — ONDANSETRON HYDROCHLORIDE 2 MG/ML
4 INJECTION, SOLUTION INTRAVENOUS EVERY 8 HOURS PRN
Status: DISCONTINUED | OUTPATIENT
Start: 2024-01-17 | End: 2024-01-17 | Stop reason: HOSPADM

## 2024-01-17 RX ORDER — DOCUSATE SODIUM 100 MG/1
100 CAPSULE, LIQUID FILLED ORAL 2 TIMES DAILY
Status: DISCONTINUED | OUTPATIENT
Start: 2024-01-17 | End: 2024-01-29 | Stop reason: HOSPADM

## 2024-01-17 RX ORDER — ALUMINUM HYDROXIDE, MAGNESIUM HYDROXIDE, AND SIMETHICONE 1200; 120; 1200 MG/30ML; MG/30ML; MG/30ML
30 SUSPENSION ORAL 4 TIMES DAILY PRN
Status: DISCONTINUED | OUTPATIENT
Start: 2024-01-17 | End: 2024-01-29 | Stop reason: HOSPADM

## 2024-01-17 RX ORDER — PREGABALIN 100 MG/1
200 CAPSULE ORAL 3 TIMES DAILY
Status: DISCONTINUED | OUTPATIENT
Start: 2024-01-17 | End: 2024-01-29 | Stop reason: HOSPADM

## 2024-01-17 RX ORDER — HYDROMORPHONE HYDROCHLORIDE 1 MG/ML
0.5 INJECTION, SOLUTION INTRAMUSCULAR; INTRAVENOUS; SUBCUTANEOUS EVERY 6 HOURS PRN
Status: DISCONTINUED | OUTPATIENT
Start: 2024-01-17 | End: 2024-01-17 | Stop reason: HOSPADM

## 2024-01-17 RX ADMIN — DIPHENHYDRAMINE HYDROCHLORIDE 5 ML: 12.5 SOLUTION ORAL at 10:01

## 2024-01-17 RX ADMIN — PIPERACILLIN SODIUM AND TAZOBACTAM SODIUM 4.5 G: 4; .5 INJECTION, POWDER, LYOPHILIZED, FOR SOLUTION INTRAVENOUS at 08:01

## 2024-01-17 RX ADMIN — AMIODARONE HYDROCHLORIDE 1 MG/MIN: 1.8 INJECTION, SOLUTION INTRAVENOUS at 12:01

## 2024-01-17 RX ADMIN — METOPROLOL TARTRATE 50 MG: 50 TABLET, FILM COATED ORAL at 09:01

## 2024-01-17 RX ADMIN — HYDROMORPHONE HYDROCHLORIDE 0.5 MG: 0.5 INJECTION, SOLUTION INTRAMUSCULAR; INTRAVENOUS; SUBCUTANEOUS at 10:01

## 2024-01-17 RX ADMIN — VANCOMYCIN HYDROCHLORIDE 1000 MG: 1 INJECTION, POWDER, LYOPHILIZED, FOR SOLUTION INTRAVENOUS at 09:01

## 2024-01-17 RX ADMIN — Medication 6 MG: at 03:01

## 2024-01-17 RX ADMIN — APIXABAN 2.5 MG: 2.5 TABLET, FILM COATED ORAL at 09:01

## 2024-01-17 RX ADMIN — NYSTATIN 500000 UNITS: 100000 SUSPENSION ORAL at 04:01

## 2024-01-17 RX ADMIN — PREGABALIN 200 MG: 100 CAPSULE ORAL at 09:01

## 2024-01-17 RX ADMIN — PIPERACILLIN SODIUM AND TAZOBACTAM SODIUM 4.5 G: 4; .5 INJECTION, POWDER, LYOPHILIZED, FOR SOLUTION INTRAVENOUS at 04:01

## 2024-01-17 RX ADMIN — ONDANSETRON 4 MG: 2 INJECTION INTRAMUSCULAR; INTRAVENOUS at 08:01

## 2024-01-17 RX ADMIN — NYSTATIN 500000 UNITS: 100000 SUSPENSION ORAL at 09:01

## 2024-01-17 RX ADMIN — DOCUSATE SODIUM 100 MG: 100 CAPSULE, LIQUID FILLED ORAL at 09:01

## 2024-01-17 RX ADMIN — DIPHENHYDRAMINE HYDROCHLORIDE 5 ML: 12.5 SOLUTION ORAL at 12:01

## 2024-01-17 RX ADMIN — HYDROMORPHONE HYDROCHLORIDE 0.5 MG: 0.5 INJECTION, SOLUTION INTRAMUSCULAR; INTRAVENOUS; SUBCUTANEOUS at 04:01

## 2024-01-17 RX ADMIN — VANCOMYCIN HYDROCHLORIDE 750 MG: 750 INJECTION, POWDER, LYOPHILIZED, FOR SOLUTION INTRAVENOUS at 10:01

## 2024-01-17 RX ADMIN — MORPHINE SULFATE 4 MG: 4 INJECTION, SOLUTION INTRAMUSCULAR; INTRAVENOUS at 08:01

## 2024-01-17 RX ADMIN — MUPIROCIN: 20 OINTMENT TOPICAL at 09:01

## 2024-01-17 NOTE — ASSESSMENT & PLAN NOTE
Pt p/w worsening left hip pain over the past week. Pt has history of chronic left hip pain ever since a left hip fracture and surgical repair a few years ago.  They said she fell a month or so ago and has increased pain since.  The pain has been worsening despite treatment with tramadol and Percocet. She has been unable to ambulate for the last week. On presentation, pt febrile 100.6, WBC 28, tachycardic, procal 5. CT pelvis showed multiple fluid collections surrounding the left hip joint, measuring up to 9 cm in diameter with layering hyperdense fluid. Per interpretation, these collections may represent multiple synovial cysts, hemorrhage, or even abscess. Additionally noted that the left hip nail traverses the femoral head and extends into the left acetabular roof.     Plan:  - Continue broad spectrum antibiotics, vanc and zosyn  - Follow blood cultures, tailor antibiotic therapy accordingly  - Consult Orthopedics for further recommendations

## 2024-01-17 NOTE — ED NOTES
[facility] Awaiting Dr. Garima fraser of pt for potential transfer. Kavon West, Director [facility]ace.

## 2024-01-17 NOTE — ED NOTES
Report rec. Pt awake, alert. Answers questions appropriately. Informed of plan of care. On cm, pulse ox, nibp. Pump off at this time. NSR noted 82. Repeat EKG done. Shown to Dr. Garcia.

## 2024-01-17 NOTE — ASSESSMENT & PLAN NOTE
78F w/hx of HTN, AF, ORIF of left hip, and HFpEF, who p/w severe left hip pain and worsening PO intake. Patient is in atrial fib with RVR. Per chart review, it is noted that the patient has had paroxysmal atrial fibrillation for a long time but was not treated for it until June 2022 after developing AF w/RVR during a hospitalization. Since then, her medications noted to be metoprolol 50 mg p.o. t.i.d., amiodarone 200 (both of these noted to not being taken on 12/6/23) and Eliquis 2.5 BID for AC. At [facility], pt noted to be in AF with RVR with rate up to the 200s. She received 2 doses of IV cardizem and IV metoprolol with slight improvement in rates, but still in 160-180s. Was started on cardizem gtt. [facility] recommended dose of IV digoxin and starting amiodarone gtt. Patient transferred to [facility] for higher level of care for management of AF with RVR requiring infusions.    Plan:  - Continue Cardizem gtt and amiodarone gtt.  - Continue Eliquis 2.5 BID  - Consult Cardiology for further recommendations and evaluation for possible cardioversion if patient still in RVR

## 2024-01-17 NOTE — PLAN OF CARE
Formerly [facility] - Emergency Dept  Initial Discharge Assessment       Primary Care Provider: Alphonse Boothe III, MD    Admission Diagnosis: No admission diagnoses are documented for this encounter.    Admission Date: 1/16/2024  Expected Discharge Date:     Pt is a 78-year-old female/male who arrived from home with No active principal problem. Information verified as correct on facesheet. The assessment was completed at the patient's bedside.  Pt lives with spouse, Otoniel Ray [phone]. Pt does not have a Living Will or Advance Directive. The Pt is was unable to be assessed, she was babbling. PCP last seen.  Pt denies dialysis, HH, and has not been readmitted into the hospital in the last thirty days.  Pt is capable of performing ADLs with assistance. Pt's spouse or son drivers her to and from medical appointments. Pt's spouse, reports she takes Eliquis/medication as prescribed; pharmacy used [facility].   sent YEHUDA referral to [facility]. Pt accept by [facility] referral number [id number]; start of care date 1/ pending discharge date. Pt verbalized plan to discharge home via family transport. Pt has no other needs to be addressed at this time. CM reviewed the chart and will continue to monitor.       Transition of Care Barriers: None    Payor: [employer]ET[employer] MANAGED MEDICARE / Plan: AETNA MEDICARE PLAN PPO / Product Type: Medicare Advantage /     Extended Emergency Contact Information  Primary Emergency Contact: Otoniel Ray  Address: [address] [place] [facility]  Home Phone: [phone]  Mobile Phone: [phone]  Relation: Spouse  Secondary Emergency Contact: SAHRA MCGUIRE  Mobile Phone: [phone]  Relation: Son    Discharge Plan A: Home with family  Discharge Plan B: Home Health      [id number] - [place] - [address] [facility]  [address]  [place] [address]  Phone: [phone] Fax:  [phone]      Initial Assessment (most recent)       Adult Discharge Assessment - 01/17/24 0916          Discharge Assessment    Assessment Type Discharge Planning Assessment     Confirmed/corrected address, phone number and insurance Yes     Confirmed Demographics Correct on Facesheet     Source of Information family     If unable to respond/provide information was family/caregiver contacted? Yes     Contact Name/Number Otoniel Ray/spouse [phone]     When was your last doctors appointment? --   Pt's  could not recall.    People in Home spouse     Facility Arrived From: home     Do you expect to return to your current living situation? Yes     Do you have help at home or someone to help you manage your care at home? Yes     Who are your caregiver(s) and their phone number(s)? Otoniel Ray/spouse [phone]     Prior to hospitilization cognitive status: Alert/Oriented     Current cognitive status: Unable to Assess     Walking or Climbing Stairs Difficulty yes     Walking or Climbing Stairs ambulation difficulty, requires equipment     Mobility Management walker     Dressing/Bathing Difficulty yes     Dressing/Bathing bathing difficulty, assistance 1 person     Home Accessibility wheelchair accessible     Equipment Currently Used at Home walker, neelam     Readmission within 30 days? No     Patient currently being followed by outpatient case management? No     Do you currently have service(s) that help you manage your care at home? Yes     Name and Contact number of agency [facility]     Is the pt/caregiver preference to resume services with current agency Yes     Do you take prescription medications? Yes     Do you have prescription coverage? Yes     Coverage Payor: AETNA MANAGED MEDICARE - AETNA MEDICARE PLAN PPO -     Do you have any problems affording any of your prescribed medications? No     Is the patient taking medications as prescribed? yes     Who is going to help you get home at  discharge? Otoniel Ray/spouse [phone]     How do you get to doctors appointments? family or friend will provide     Are you on dialysis? No     Do you take coumadin? Yes   Eliquis    Discharge Plan A Home with family     Discharge Plan B Home Health     Discharge Plan discussed with: Spouse/sig other     Name(s) and Number(s) Otoniel Ray/spouse [phone]     Transition of Care Barriers None

## 2024-01-17 NOTE — ED NOTES
Pt resting. Med given for c/o L hip pain. Speech clear. Awake, alert, answers questions appropriately. Explained wait for transfer. Continue monitoring. On cm, pulse ox, nibp.

## 2024-01-17 NOTE — PROVIDER TRANSFER
Outside Transfer Acceptance Note / Regional Referral Center    Referring facility: [facility]   Referring provider: PALMER DUNLAP BEAU D.  Accepting facility: OUTSIDE FACILITY  [facility]  Accepting provider: MARINO WINSLOW  Admitting provider: Dr. Pepe  Reason for transfer: Higher Level of Care  Transfer diagnosis: septic arthiritis   Transfer specialty requested: Critical Care Medicine  Orthopedic Surgery  Transfer specialty notified: yes  Transfer level: NUMBER 1-5: 2  Bed type requested: ICU  Isolation status: No active isolations   Admission class or status: IP- Inpatient  Emergency      Narrative     Pt is a 78-year-old female with Pmhx of hypertension, iron deficiency anemia, atrial fibrillation, on Eliquis( was not taking ), spinal stenosis, peripheral neuropathy, seizure disorder, umbilical hernia who presnted to the ED with complaints of left hip pain over the last 2 weeks. She has been bed bound due to the uncontrolled pain. She has had chronic left hip pain ever since a left hip fracture and surgical repair a few years ago. She fell a month or so ago in his had increased pain since.  The pain has been worsening despite treatment with tramadol and Percocet. She has also had decreased appetite and oral intake the last 2 weeks.  Chart review shows that she was admitted and had surgery to repair an incarcerated inguinal hernia last month.  On arrival pt also found to be in sinus tachycardia with HR in 130s. Rate then increased to 200s in A fib. BP stayed stable. Cardiology was consulted and pt is now on cardizem drip maxed, metoprolol pushed. Digoxin 500, amiodarone bolus and drip. Labs in the ED showed WBC 28, H/H 9.4/29, plt 698. ESR > 90, Na 128, AST//158.  T bili 1.2.  .  Troponin high sensitivity 21, lactate 1.3, procalcitonin 5.3.  Flu, COVID negative.CXR CXR nonacute.  Left hip x-ray shows Apparent progression  of deformity of the left femoral head associated with superior joint space narrowing. There appears to be extension of the fixating hardware beyond the expected cortex of the left femoral head.  CT abdomen/pelvis with IV contrast was done which shows postoperative changes of the left hip joint with severe degenerative changes noted. The left hip nail traverses the femoral head and extends into the left acetabular roof. Interval development of multiple fluid collections surrounding the left hip joint is noted measuring up to 9 cm in diameter with layering hyperdense fluid. These may represent multiple synovial cysts, hemorrhage, or even abscess. Minimal air is noted in the left hip joint. Patient to be transferred to [facility] for orthopedic consultation and Cardiology consultation.    Objective     Vitals: Temp: 98.8 °F (37.1 °C) (01/16/24 1710)  Pulse: (!) 161 (01/16/24 1759)  Resp: 20 (01/16/24 1735)  BP: 124/68 (01/16/24 1755)  SpO2: 96 % (01/16/24 1758)    Recent Labs: All pertinent labs within the past 24 hours have been reviewed.  CBC:   Recent Labs   Lab 01/16/24  1437   WBC 28.57*   HGB 9.4*   HCT 29.8*   *     CMP:   Recent Labs   Lab 01/16/24  1437   *   K 3.7   CL 92*   CO2 23      BUN 15   CREATININE 0.6   CALCIUM 9.0   PROT 7.5   ALBUMIN 3.0*   BILITOT 1.2*   ALKPHOS 243*   *   *   ANIONGAP 13       Recent imaging:   Imaging Results              CT Abdomen Pelvis With IV Contrast NO Oral Contrast (Final result)  Result time 01/16/24 16:43:58      Final result by Richard Bernard MD (01/16/24 16:43:58)                   Narrative:    CMS MANDATED QUALITY DATA - CT RADIATION - 436    One or more of the following dose-optimizing techniques was utilized for this exam: automated exposure control, adjustment of the mA and/or kV according to patient size, and/or use of iterative reconstruction technique.      HISTORY:Abdominal pain, acute, nonlocalized.    TECHNIQUE: Serial  axial images were obtained from the domes of the diaphragm to the pubic symphysis with 100ccs Omnipaque 350 intravenous contrast. Oral contrast was not administered for this exam. Coronal and sagittal reconstructions were performed. Patient received 528 mGy-cm of radiation exposure from this exam.    COMPARISON: Comparison to previous study dated December 6, 2023.    FINDINGS:  Lung bases: Stable appearing 3 mm noncalcified pulmonary nodule is noted in the peripheral aspect of the right lung base. Calcified granuloma is noted in the left lung base. No basilar infiltrate or pleural effusion is seen.    Liver: Liver appears normal in size. Scattered subcentimeter hepatic low densities are present most consistent with tiny cysts without significant interval change. Gallbladder appears nondistended.    Pancreas: Pancreas is unremarkable.    Spleen: Normal.    Adrenal glands: Mild thickening of the adrenal glands is noted without significant interval change.    Kidneys and ureters: Bilateral functioning kidneys are visualized. No hydronephrosis is seen. Nonobstructing 5 mm left lower pole intrarenal calculi is noted. Ureters appear nondilated.    Bladder: Bladder is unremarkable. Small calcified phleboliths are noted in the pelvis.    Bowel: Small hiatal hernia is present. Stomach and small bowel appear nondistended. Moderate volume of colonic fecal material is present. No imaging evidence of appendiceal inflammation is seen. Mild bowel wall thickening with minimal pericolonic inflammatory changes of the ascending colon is present suspicious for mild colitis. Consider follow-up colonoscopy. No evidence of bowel obstruction is seen.    Peritoneum: No free air or free fluid is seen.    Retroperitoneum: Unremarkable.    Lymph nodes: No evidence of abdominal or pelvic adenopathy is seen.    Abdominal wall: Peripherally calcified 1.6 cm mass of the right breast is again noted unchanged. Mild skin thickening is again  visualized in the periumbilical region similar to previous exam.    Bones: Postop changes of the left hip is visualized with severe degenerative changes of the left hip joint with marked joint space narrowing. The left hip nail traverses the femoral head and extends into the acetabular roof. Interval development of multiple fluid collections are noted superior to the left hip joint with hyperdense layering fluid within these fluid collection. Fluid collections collectively measure at least 9 x 4 cm in size. Severe degenerative changes of the lumbar spine are again visualized with levoscoliosis noted. Old bilateral fracture deformity of the pubis is noted.    IMPRESSION:  1. Mild bowel wall thickening and pericolonic inflammatory changes of the ascending colon suspicious for mild colitis. Consider follow-up colonoscopy.  2. Small hiatal hernia.  3. Postoperative changes of the left hip joint with severe degenerative changes noted. The left hip nail traverses the femoral head and extends into the left acetabular roof. Interval development of multiple fluid collections surrounding the left hip joint is noted measuring up to 9 cm in diameter with layering hyperdense fluid. These may represent multiple synovial cysts, hemorrhage, or even abscess. Minimal air is noted in the left hip joint. Recommend correlation with patient's clinical and treatment history regarding the left hip.  4. Severe levoscoliosis and degenerative changes of the lumbar spine.  5. Nonobstructing left lower pole intrarenal calculi.        Electronically signed by:  Richard Bernard MD  01/16/2024 04:43 PM [facility] Workstation: [id number]                                     X-Ray Chest AP Portable (Final result)  Result time 01/16/24 15:12:10      Final result by Bryan Stevens MD (01/16/24 15:12:10)                   Narrative:    Reason: Sepsis Weakness (Generalized X 2weeks); Hip Pain (Left )    FINDINGS:  Portable chest at 1451 compared to 12/6/2023  shows normal cardiomediastinal silhouette.    Lungs are clear. Pulmonary vasculature is normal. Chronic right rotator cuff deficiency evident degenerative changes of the right glenohumeral joint. No acute osseous abnormality.    IMPRESSION:  No acute cardiopulmonary abnormality.    Electronically signed by:  Bryan Stevens MD  01/16/2024 03:12 PM [facility] Workstation: [id number]                                     X-Ray Hip 2 or 3 views Left (with Pelvis when performed) (Final result)  Result time 01/16/24 15:14:50      Final result by Shahid Wellington IV, MD (01/16/24 15:14:50)                   Narrative:    Pelvis and left hip, 3 views    HISTORY: Hip pain.    Comparison is made to previous examinations including the study of 11/19/2023.    There has been previous placement of intramedullary monika within the proximal left femur for fixation of left femoral fracture. The component extending through the femoral head appears to extend beyond the superior cortex. There is deformity of the left femoral head which appears greater than on the previous examination. This is associated with narrowing of the joint space.    The right hip joint appears appropriately maintained.    There are multilevel degenerative disc and facet changes within the lumbar spine associated with leftward curvature.    There is deformity of the left superior and inferior pubic rami and right pubic bone compatible with remote healed fractures.    IMPRESSION:    Apparent progression of deformity of the left femoral head associated with superior joint space narrowing. There appears to be extension of the fixating hardware beyond the expected cortex of the left femoral head. Consider further investigation with CT.    Healed pelvic fractures.    Multilevel degenerative changes within the spine associated with leftward curvature.    Electronically signed by:  Shahid Wellington MD  01/16/2024 03:14 PM [facility] Workstation: [id number]                                    IV  access:        Peripheral IV - Single Lumen 01/16/24 1430 20 G Anterior;Left;Proximal Forearm (Active)            Peripheral IV - Single Lumen 01/16/24 1653 20 G Left Upper Arm (Active)   Site Assessment Clean 01/16/24 1653   Line Status Blood return noted;Flushed;Saline locked 01/16/24 1653       Allergies: Review of patient's allergies indicates:  No Known Allergies     NPO: No    Anticoagulation:   Anticoagulants       None             Instructions      Upon patient arrival to the ICU, please contact Critical Care Medicine on call.

## 2024-01-17 NOTE — ASSESSMENT & PLAN NOTE
This patient does have evidence of infective focus  My overall impression is sepsis.  Source:  Left hip  Antibiotics given-   Antibiotics (72h ago, onward)      None          Latest lactate reviewed-  Recent Labs   Lab 01/16/24 2011   LACTATE 1.3     Organ dysfunction indicated by Acute heart failure    Fluid challenge Not needed - patient is not hypotensive      Post- resuscitation assessment No - Post resuscitation assessment not needed       Will Not start Pressors- Levophed for MAP of 65  Source control achieved by: Antibiotics

## 2024-01-17 NOTE — ED PROVIDER NOTES
Encounter Date: 1/16/2024       History     Chief Complaint   Patient presents with    Weakness     Generalized X 2weeks    Hip Pain     Left      HPI  Review of patient's allergies indicates:  No Known Allergies  Past Medical History:   Diagnosis Date    Anemia     Arthritis     Bleeding ulcer 07/2016    Chronic pain     DDD (degenerative disc disease), cervical     DDD (degenerative disc disease), lumbar     Depression     Disc degeneration, lumbosacral     Diverticulitis     Encounter for blood transfusion     Hypertension     IBS (irritable bowel syndrome)     Neuropathy of both feet     Seizures     none  since 2017    Umbilical hernia 2020     Past Surgical History:   Procedure Laterality Date    BACK SURGERY      BREAST BIOPSY      BREAST SURGERY Right     lumpectomy    CAUDAL EPIDURAL STEROID INJECTION N/A 01/28/2022    Procedure: Injection-steroid-epidural-caudal;  Surgeon: Luzmaria Marcelino MD;  [facility]  Service: Pain Management;  Laterality: N/A;    COLONOSCOPY N/A 01/14/2022    Procedure: COLONOSCOPY;  Surgeon: Abby Landers MD;  Location: [facility];  Service: Endoscopy;  Laterality: N/A;    ENDOSCOPIC ULTRASOUND OF UPPER GASTROINTESTINAL TRACT N/A 07/21/2020    Procedure: ULTRASOUND, UPPER GI TRACT, ENDOSCOPIC;  Surgeon: Marcio Nguyễn III, MD;  Location: [facility];  Service: Endoscopy;  Laterality: N/A;    ESOPHAGOGASTRODUODENOSCOPY N/A 01/14/2022    Procedure: EGD (ESOPHAGOGASTRODUODENOSCOPY);  Surgeon: Abby Landers MD;  Location: [facility];  Service: Endoscopy;  Laterality: N/A;    ESOPHAGOGASTRODUODENOSCOPY N/A 06/10/2022    Procedure: EGD (ESOPHAGOGASTRODUODENOSCOPY);  Surgeon: Abby Landers MD;  Location: [facility];  Service: Endoscopy;  Laterality: N/A;    EYE SURGERY      cataract    HYSTERECTOMY      INTRAMEDULLARY RODDING OF TROCHANTER OF FEMUR Left 12/11/2018    Procedure: INSERTION, INTRAMEDULLARY SANTOS, FEMUR, TROCHANTER;  Surgeon: Eulalio De La Cruz MD;   Location: [facility] OR;  Service: Orthopedics;  Laterality: Left;    REPAIR OF EPIGASTRIC HERNIA N/A 12/7/2023    Procedure: REPAIR, HERNIA, EPIGASTRIC;  Surgeon: Vipul Escobar MD;  Location: [facility] OR;  Service: General;  Laterality: N/A;    SPINAL CORD STIMULATOR IMPLANT  09/18/2013    and removal    SPINE SURGERY  2006    lumbar L2-S1 decompression.    SPINE SURGERY      cervical decompression    TONSILLECTOMY       Family History   Problem Relation Age of Onset    Diabetes Mother     Hypertension Mother     Irritable bowel syndrome Mother     Diabetes Father         insulin dependent    Hypertension Father     Heart disease Father     Coronary artery disease Father     Depression Father     Diabetes Sister     Diabetes Brother     COPD Brother      Social History     Tobacco Use    Smoking status: Never    Smokeless tobacco: Never   Substance Use Topics    Alcohol use: No    Drug use: No     Review of Systems   Genitourinary:         Diaper dependent       Physical Exam     Initial Vitals [01/16/24 1325]   BP Pulse Resp Temp SpO2   (!) 145/94 (!) 132 (!) 25 99.3 °F (37.4 °C) 100 %      MAP       --         Physical Exam      Neurological: GCS eye subscore is 4. GCS verbal subscore is 5. GCS motor subscore is 6.         ED Course   Procedures  Labs Reviewed   CULTURE, URINE - Abnormal; Notable for the following components:       Result Value    Urine Culture, Routine   (*)     Value: STAPHYLOCOCCUS AUREUS  >100,000cfu/ml  Susceptibility pending      All other components within normal limits    Narrative:     Specimen Source->Urine   CBC W/ AUTO DIFFERENTIAL - Abnormal; Notable for the following components:    WBC 28.57 (*)     RBC 3.97 (*)     Hemoglobin 9.4 (*)     Hematocrit 29.8 (*)     MCV 75 (*)     MCH 23.7 (*)     MCHC 31.5 (*)     RDW 16.0 (*)     Platelets 698 (*)     Immature Granulocytes 3.4 (*)     Gran # (ANC) 25.4 (*)     Immature Grans (Abs) 0.96 (*)     Lymph # 0.8 (*)     Mono # 1.3 (*)     Gran %  88.8 (*)     Lymph % 2.8 (*)     Platelet Estimate Increased (*)     All other components within normal limits   COMPREHENSIVE METABOLIC PANEL - Abnormal; Notable for the following components:    Sodium 128 (*)     Chloride 92 (*)     Albumin 3.0 (*)     Total Bilirubin 1.2 (*)     Alkaline Phosphatase 243 (*)      (*)      (*)     All other components within normal limits   LACTIC ACID, PLASMA - Abnormal; Notable for the following components:    Lactate (Lactic Acid) 2.2 (*)     All other components within normal limits   URINALYSIS, REFLEX TO URINE CULTURE - Abnormal; Notable for the following components:    Appearance, UA Hazy (*)     Protein, UA 1+ (*)     Occult Blood UA 1+ (*)     Urobilinogen, UA 2.0-3.0 (*)     Leukocytes, UA 3+ (*)     All other components within normal limits    Narrative:     Specimen Source->Urine   B-TYPE NATRIURETIC PEPTIDE - Abnormal; Notable for the following components:     (*)     All other components within normal limits   TROPONIN I HIGH SENSITIVITY - Abnormal; Notable for the following components:    Troponin I High Sensitivity 21.1 (*)     All other components within normal limits   PROCALCITONIN - Abnormal; Notable for the following components:    Procalcitonin 5.304 (*)     All other components within normal limits   SEDIMENTATION RATE - Abnormal; Notable for the following components:    Sed Rate >90 (*)     All other components within normal limits   C-REACTIVE PROTEIN - Abnormal; Notable for the following components:    .7 (*)     All other components within normal limits   URINALYSIS MICROSCOPIC - Abnormal; Notable for the following components:    RBC, UA 10 (*)     WBC, UA >100 (*)     Bacteria Few (*)     Hyaline Casts, UA 2 (*)     All other components within normal limits    Narrative:     Specimen Source->Urine   CULTURE, BLOOD    Narrative:     Aerobic and anaerobic   CULTURE, BLOOD    Narrative:     Aerobic and anaerobic   INFLUENZA A AND  B ANTIGEN    Narrative:     Specimen Source->Nasopharyngeal Swab   SARS-COV-2 RNA AMPLIFICATION, QUAL   LACTIC ACID, PLASMA   ISTAT CREATININE   POCT CREATININE        ECG Results              EKG 12-lead (In process)  Result time 01/17/24 06:54:57      In process by Interface, Lab In [facility] (01/17/24 06:54:57)                   Narrative:    Test Reason : I48.91,    Vent. Rate : 083 BPM     Atrial Rate : 083 BPM     P-R Int : 158 ms          QRS Dur : 088 ms      QT Int : 300 ms       P-R-T Axes : 027 004 020 degrees     QTc Int : 352 ms    Normal sinus rhythm  Nonspecific T wave abnormality  Abnormal ECG  When compared with ECG of 16-JAN-2024 16:27,  Sinus rhythm has replaced Atrial fibrillation  Vent. rate has decreased BY  75 BPM  ST no longer depressed in Anterior leads  Nonspecific T wave abnormality now evident in Anterior leads    Referred By: [email or web address]   SELF           Confirmed By:                                      EKG 12-lead (In process)  Result time 01/16/24 16:30:02      In process by Interface, Lab In [facility] (01/16/24 16:30:02)                   Narrative:    Test Reason : I48.91,    Vent. Rate : 158 BPM     Atrial Rate : 159 BPM     P-R Int : 000 ms          QRS Dur : 080 ms      QT Int : 282 ms       P-R-T Axes : 000 028 201 degrees     QTc Int : 457 ms    Atrial fibrillation with rapid ventricular response  Nonspecific ST and T wave abnormality  Abnormal ECG  When compared with ECG of 16-JAN-2024 13:34,  Atrial fibrillation has replaced Sinus rhythm  ST now depressed in Inferior leads  Nonspecific T wave abnormality now evident in Inferior leads  Inverted T waves have replaced nonspecific T wave abnormality in Lateral  leads    Referred By: [email or web address]   SELF           Confirmed By:                                      EKG 12-lead (In process)  Result time 01/16/24 14:28:14      In process by Interface, Lab In [facility] (01/16/24 14:28:14)                   Narrative:    Test Reason :  R00.0,    Vent. Rate : 132 BPM     Atrial Rate : 132 BPM     P-R Int : 134 ms          QRS Dur : 078 ms      QT Int : 298 ms       P-R-T Axes : 070 037 085 degrees     QTc Int : 441 ms    Sinus tachycardia with Premature atrial complexes  Possible Left atrial enlargement  Borderline Abnormal ECG  When compared with ECG of 06-DEC-2023 14:49,  Premature atrial complexes are now Present    Referred By: [email or web address]   SELF           Confirmed By:                                   Imaging Results              CT Abdomen Pelvis With IV Contrast NO Oral Contrast (Final result)  Result time 01/16/24 16:43:58      Final result by Richard Bernard MD (01/16/24 16:43:58)                   Narrative:    CMS MANDATED QUALITY DATA - CT RADIATION - 436    One or more of the following dose-optimizing techniques was utilized for this exam: automated exposure control, adjustment of the mA and/or kV according to patient size, and/or use of iterative reconstruction technique.      HISTORY:Abdominal pain, acute, nonlocalized.    TECHNIQUE: Serial axial images were obtained from the domes of the diaphragm to the pubic symphysis with 100ccs Omnipaque 350 intravenous contrast. Oral contrast was not administered for this exam. Coronal and sagittal reconstructions were performed. Patient received 528 mGy-cm of radiation exposure from this exam.    COMPARISON: Comparison to previous study dated December 6, 2023.    FINDINGS:  Lung bases: Stable appearing 3 mm noncalcified pulmonary nodule is noted in the peripheral aspect of the right lung base. Calcified granuloma is noted in the left lung base. No basilar infiltrate or pleural effusion is seen.    Liver: Liver appears normal in size. Scattered subcentimeter hepatic low densities are present most consistent with tiny cysts without significant interval change. Gallbladder appears nondistended.    Pancreas: Pancreas is unremarkable.    Spleen: Normal.    Adrenal glands: Mild thickening of the  adrenal glands is noted without significant interval change.    Kidneys and ureters: Bilateral functioning kidneys are visualized. No hydronephrosis is seen. Nonobstructing 5 mm left lower pole intrarenal calculi is noted. Ureters appear nondilated.    Bladder: Bladder is unremarkable. Small calcified phleboliths are noted in the pelvis.    Bowel: Small hiatal hernia is present. Stomach and small bowel appear nondistended. Moderate volume of colonic fecal material is present. No imaging evidence of appendiceal inflammation is seen. Mild bowel wall thickening with minimal pericolonic inflammatory changes of the ascending colon is present suspicious for mild colitis. Consider follow-up colonoscopy. No evidence of bowel obstruction is seen.    Peritoneum: No free air or free fluid is seen.    Retroperitoneum: Unremarkable.    Lymph nodes: No evidence of abdominal or pelvic adenopathy is seen.    Abdominal wall: Peripherally calcified 1.6 cm mass of the right breast is again noted unchanged. Mild skin thickening is again visualized in the periumbilical region similar to previous exam.    Bones: Postop changes of the left hip is visualized with severe degenerative changes of the left hip joint with marked joint space narrowing. The left hip nail traverses the femoral head and extends into the acetabular roof. Interval development of multiple fluid collections are noted superior to the left hip joint with hyperdense layering fluid within these fluid collection. Fluid collections collectively measure at least 9 x 4 cm in size. Severe degenerative changes of the lumbar spine are again visualized with levoscoliosis noted. Old bilateral fracture deformity of the pubis is noted.    IMPRESSION:  1. Mild bowel wall thickening and pericolonic inflammatory changes of the ascending colon suspicious for mild colitis. Consider follow-up colonoscopy.  2. Small hiatal hernia.  3. Postoperative changes of the left hip joint with severe  degenerative changes noted. The left hip nail traverses the femoral head and extends into the left acetabular roof. Interval development of multiple fluid collections surrounding the left hip joint is noted measuring up to 9 cm in diameter with layering hyperdense fluid. These may represent multiple synovial cysts, hemorrhage, or even abscess. Minimal air is noted in the left hip joint. Recommend correlation with patient's clinical and treatment history regarding the left hip.  4. Severe levoscoliosis and degenerative changes of the lumbar spine.  5. Nonobstructing left lower pole intrarenal calculi.        Electronically signed by:  Richard Bernard MD  01/16/2024 04:43 PM CST Workstation: [id number]                                     X-Ray Chest AP Portable (Final result)  Result time 01/16/24 15:12:10      Final result by Bryan Stevens MD (01/16/24 15:12:10)                   Narrative:    Reason: Sepsis Weakness (Generalized X 2weeks); Hip Pain (Left )    FINDINGS:  Portable chest at 1451 compared to 12/6/2023 shows normal cardiomediastinal silhouette.    Lungs are clear. Pulmonary vasculature is normal. Chronic right rotator cuff deficiency evident degenerative changes of the right glenohumeral joint. No acute osseous abnormality.    IMPRESSION:  No acute cardiopulmonary abnormality.    Electronically signed by:  Bryan Stevens MD  01/16/2024 03:12 PM CST Workstation: [id number]                                     X-Ray Hip 2 or 3 views Left (with Pelvis when performed) (Final result)  Result time 01/16/24 15:14:50      Final result by Shahid Wellington IV, MD (01/16/24 15:14:50)                   Narrative:    Pelvis and left hip, 3 views    HISTORY: Hip pain.    Comparison is made to previous examinations including the study of 11/19/2023.    There has been previous placement of intramedullary monika within the proximal left femur for fixation of left femoral fracture. The component extending through the femoral  head appears to extend beyond the superior cortex. There is deformity of the left femoral head which appears greater than on the previous examination. This is associated with narrowing of the joint space.    The right hip joint appears appropriately maintained.    There are multilevel degenerative disc and facet changes within the lumbar spine associated with leftward curvature.    There is deformity of the left superior and inferior pubic rami and right pubic bone compatible with remote healed fractures.    IMPRESSION:    Apparent progression of deformity of the left femoral head associated with superior joint space narrowing. There appears to be extension of the fixating hardware beyond the expected cortex of the left femoral head. Consider further investigation with CT.    Healed pelvic fractures.    Multilevel degenerative changes within the spine associated with leftward curvature.    Electronically signed by:  Shahid Wellington MD  01/16/2024 03:14 PM [facility] Workstation: [id number]                                     Medications   diltiaZEM 100 mg in dextrose 5% 100 mL IVPB (ready to mix) (titrating) (0 mg/hr Intravenous Stopped 1/17/24 0500)   amiodarone 360 mg/200 mL (1.8 mg/mL) infusion (1 mg/min Intravenous New Bag 1/17/24 0010)   amiodarone 360 mg/200 mL (1.8 mg/mL) infusion (0.5 mg/min Intravenous Not Given 1/17/24 0006)   (Magic mouthwash) 1:1:1 diphenhydrAMINE(Benadryl) 12.5mg/5ml liq, aluminum & magnesium hydroxide-simethicone (Maalox), LIDOcaine viscous 2% (5 mLs Swish & Spit Given 1/17/24 0010)   melatonin tablet 6 mg (6 mg Oral Given 1/17/24 0335)   vancomycin - pharmacy to dose (has no administration in time range)   piperacillin-tazobactam 4.5 g in dextrose 5 % 100 mL IVPB (ready to mix) (4.5 g Intravenous New Bag 1/17/24 4155)   vancomycin 750 mg in dextrose 5 % 250 mL IVPB (ready to mix) (750 mg Intravenous Trough Due As Scheduled Before Dose 1/18/24 0930)   HYDROmorphone injection 0.5 mg (has no  administration in time range)   lactated ringers bolus 1,779 mL (0 mLs Intravenous Stopped 1/16/24 1555)   morphine injection 4 mg (4 mg Intravenous Given 1/16/24 1504)   ondansetron injection 4 mg (4 mg Intravenous Given 1/16/24 1505)   iohexoL (OMNIPAQUE 350) injection 100 mL (100 mLs Intravenous Given 1/16/24 1618)   piperacillin-tazobactam 3.375 g in dextrose 5 % 100 mL IVPB (ready to mix) (0 g Intravenous Stopped 1/16/24 2018)   vancomycin (VANCOCIN) 1,750 mg in dextrose 5 % (D5W) 500 mL IVPB (0 mg Intravenous Stopped 1/16/24 2217)   morphine injection 4 mg (4 mg Intravenous Given 1/16/24 1532)   acetaminophen tablet 1,000 mg (1,000 mg Oral Given 1/16/24 1636)   diltiaZEM injection 10 mg (10 mg Intravenous Given 1/16/24 1626)   diltiaZEM injection 10 mg (10 mg Intravenous Given 1/16/24 1632)   metoprolol injection 5 mg (5 mg Intravenous Given 1/16/24 1705)   metoprolol injection 5 mg (5 mg Intravenous Given 1/16/24 1755)   digoxin injection 500 mcg (500 mcg Intravenous Given 1/16/24 1759)   amiodarone in dextrose 150 mg/100 mL (1.5 mg/mL) loading dose 150 mg (0 mg Intravenous Stopped 1/16/24 1817)   morphine injection 4 mg (4 mg Intravenous Given 1/16/24 2011)   morphine injection 4 mg (4 mg Intravenous Given 1/17/24 0858)   ondansetron injection 4 mg (4 mg Intravenous Given 1/17/24 0857)     Medical Decision Making  Amount and/or Complexity of Data Reviewed  Labs: ordered.  Radiology: ordered.    Risk  OTC drugs.  Prescription drug management.               ED Course as of 01/17/24 1053   Tue Jan 16, 2024   1831 Patient developed AFib with RVR, heart rate up into the 200s.  She remained stable, alert, asymptomatic, mildly hypotensive.  She was treated with 2 doses IV Cardizem bolus and Cardizem drip, mild improvement with rates into the 160s-180s.  She then received 2 doses of IV metoprolol.  Heart rate still in the 160s.  Clinically she remains stable.  I then discussed with Cardiology, Dr. Correa, who  recommended a dose IV digoxin as well as loading amiodarone bolus and starting an amiodarone drip.  Says he will likely to cardiovert her tomorrow if her rate remains uncontrolled, and to call him back if she becomes unstable. [BA]   1833 I initially discussed with ortho, Dr. German, about the patient's septic left hip.  He says that since not operated on her she should go to on referred Orthopedics.  I then discussed with and referred ortho, Dr. Vera, who advised her case is beyond the scope of this facility and she would need transfer to [facility]. [BA]   1910 I discussed with Dr. Hernandez via the transfer center who accepts the patient to [facility]. [BA]      ED Course User Index  [BA] Ethan Izquierdo MD                           Clinical Impression:  Final diagnoses:  [R00.0] Tachycardia  [M25.552] Left hip pain  [I48.91] Atrial fibrillation with rapid ventricular response  [M00.9] Pyogenic arthritis of left hip, due to unspecified organism (Primary)  [I48.91] A-fib          ED Disposition Condition    Transfer to Another Facility Stable

## 2024-01-17 NOTE — CONSULTS
"[facility]  Department of Cardiology  Consult Note      PATIENT NAME: Froilan Ray  MRN: [id number]  TODAY'S DATE: 01/17/2024  ADMIT DATE: 1/16/2024                          CONSULT REQUESTED BY: No att. providers found    SUBJECTIVE     PRINCIPAL PROBLEM: <principal problem not specified>      REASON FOR CONSULT:  Afib/RVR      HPI:  Pt is 79 yo female who was transferred from [facility] to [facility] ER due to septic arthritis and Afib RVR. See notes below. Pt has converted to normal sinus rhythm at time of cardiology consultation. She denies chest pain or dyspnea or palpitations.    "Pmhx of hypertension, iron deficiency anemia, atrial fibrillation, on Eliquis( was not taking ), spinal stenosis, peripheral neuropathy, seizure disorder, umbilical hernia who presnted to the ED with complaints of left hip pain over the last 2 weeks. She has been bed bound due to the uncontrolled pain. She has had chronic left hip pain ever since a left hip fracture and surgical repair a few years ago. She fell a month or so ago in his had increased pain since.  The pain has been worsening despite treatment with tramadol and Percocet. She has also had decreased appetite and oral intake the last 2 weeks.  Chart review shows that she was admitted and had surgery to repair an incarcerated inguinal hernia last month.  On arrival pt also found to be in sinus tachycardia with HR in 130s. Rate then increased to 200s in A fib. BP stayed stable. Cardiology was consulted and pt is now on cardizem drip maxed, metoprolol pushed. Digoxin 500, amiodarone bolus and drip. Labs in the ED showed WBC 28, H/H 9.4/29, plt 698. ESR > 90, Na 128, AST//158.  T bili 1.2.  .  Troponin high sensitivity 21, lactate 1.3, procalcitonin 5.3.  Flu, COVID negative.CXR CXR nonacute.  Left hip x-ray shows Apparent progression of deformity of the left femoral head associated with superior joint space narrowing. There appears to be extension of " "the fixating hardware beyond the expected cortex of the left femoral head.  CT abdomen/pelvis with IV contrast was done which shows postoperative changes of the left hip joint with severe degenerative changes noted. The left hip nail traverses the femoral head and extends into the left acetabular roof. Interval development of multiple fluid collections surrounding the left hip joint is noted measuring up to 9 cm in diameter with layering hyperdense fluid. These may represent multiple synovial cysts, hemorrhage, or even abscess. Minimal air is noted in the left hip joint. Patient to [place] [facility] [place]opedic consultation and Cardiology consultation."       Review of patient's allergies indicates:  No Known Allergies    Past Medical History:   Diagnosis Date    Anemia     Arthritis     Bleeding ulcer 07/2016    Chronic pain     DDD (degenerative disc disease), cervical     DDD (degenerative disc disease), lumbar     Depression     Disc degeneration, lumbosacral     Diverticulitis     Encounter for blood transfusion     Hypertension     IBS (irritable bowel syndrome)     Neuropathy of both feet     Seizures     none  since 2017    Umbilical hernia 2020     Past Surgical History:   Procedure Laterality Date    BACK SURGERY      BREAST BIOPSY      BREAST SURGERY Right     lumpectomy    CAUDAL EPIDURAL STEROID INJECTION N/A 01/28/2022    Procedure: Injection-steroid-epidural-caudal;  Surgeon: Luzmaria Marcelino MD;  Location: [facility] OR;  Service: Pain Management;  Laterality: N/A;    COLONOSCOPY N/A 01/14/2022    Procedure: COLONOSCOPY;  Surgeon: Abby Landers MD;  Location: [facility];  Service: Endoscopy;  Laterality: N/A;    ENDOSCOPIC ULTRASOUND OF UPPER GASTROINTESTINAL TRACT N/A 07/21/2020    Procedure: ULTRASOUND, UPPER GI TRACT, ENDOSCOPIC;  Surgeon: Marcio Nguyễn III, MD;  Location: [facility] ENDO;  Service: Endoscopy;  Laterality: N/A;    ESOPHAGOGASTRODUODENOSCOPY N/A 01/14/2022    Procedure: " EGD (ESOPHAGOGASTRODUODENOSCOPY);  Surgeon: Abby Landers MD;  Location: [facility] ENDO;  Service: Endoscopy;  Laterality: N/A;    ESOPHAGOGASTRODUODENOSCOPY N/A 06/10/2022    Procedure: EGD (ESOPHAGOGASTRODUODENOSCOPY);  Surgeon: Abby Landers MD;  Location: [facility] ENDO;  Service: Endoscopy;  Laterality: N/A;    EYE SURGERY      cataract    HYSTERECTOMY      INTRAMEDULLARY RODDING OF TROCHANTER OF FEMUR Left 12/11/2018    Procedure: INSERTION, INTRAMEDULLARY SANTOS, FEMUR, TROCHANTER;  Surgeon: Eulalio De La Cruz MD;  Location: [facility] OR;  Service: Orthopedics;  Laterality: Left;    REPAIR OF EPIGASTRIC HERNIA N/A 12/7/2023    Procedure: REPAIR, HERNIA, EPIGASTRIC;  Surgeon: Vipul Escobar MD;  Location: [facility] OR;  Service: General;  Laterality: N/A;    SPINAL CORD STIMULATOR IMPLANT  09/18/2013    and removal    SPINE SURGERY  2006    lumbar L2-S1 decompression.    SPINE SURGERY      cervical decompression    TONSILLECTOMY       Social History     Tobacco Use    Smoking status: Never    Smokeless tobacco: Never   Substance Use Topics    Alcohol use: No    Drug use: No        REVIEW OF SYSTEMS  Negative except as mentioned in HPI    OBJECTIVE     VITAL SIGNS (Most Recent)  Temp: 98.2 °F (36.8 °C) (01/17/24 1122)  Pulse: 98 (01/17/24 1545)  Resp: (!) 22 (01/17/24 1614)  BP: (!) 157/72 (01/17/24 1545)  SpO2: 96 % (01/17/24 1545)    VENTILATION STATUS  Resp: (!) 22 (01/17/24 1614)  SpO2: 96 % (01/17/24 1545)           I & O (Last 24H):  Intake/Output Summary (Last 24 hours) at 1/17/2024 1646  Last data filed at 1/17/2024 1259  Gross per 24 hour   Intake 1288.43 ml   Output --   Net 1288.43 ml       WEIGHTS  Wt Readings from Last 3 Encounters:   01/16/24 1325 68 kg (150 lb)   01/03/24 1033 68.5 kg (151 lb)   12/20/23 1028 68.5 kg (151 lb)       PHYSICAL EXAM    GENERAL: elderly frail female breathing comfortably in no apparent distress.  HEENT: Normocephalic.    NECK: No JVD.   CARDIAC: Regular rate and rhythm. S1 is  normal.S2 is normal.No gallops, clicks or murmurs noted at this time.  CHEST ANATOMY: normal.   LUNGS: Clear to auscultation. No wheezing or rhonchi..   ABDOMEN: Soft, non distended Normal bowel sounds.    EXTREMITIES: No edema  SKIN: No rash     HOME MEDICATIONS:  No current facility-administered medications on file prior to encounter.     Current Outpatient Medications on File Prior to Encounter   Medication Sig Dispense Refill    acetaminophen (TYLENOL) 325 MG tablet Take 650 mg by mouth every 8 (eight) hours as needed for Pain.      amlodipine-benazepril 5-20 mg (LOTREL) 5-20 mg per capsule Take 1 capsule by mouth once daily. 90 capsule 3    cyclobenzaprine (FLEXERIL) 10 MG tablet TAKE 1 TABLET(10 MG) BY MOUTH THREE TIMES DAILY AS NEEDED FOR MUSCLE SPASMS (Patient taking differently: Take 10 mg by mouth 3 (three) times daily as needed for Muscle spasms.) 90 tablet 2    docusate sodium (COLACE) 100 MG capsule Take 1 capsule (100 mg total) by mouth 2 (two) times daily. 60 capsule 1    EScitalopram oxalate (LEXAPRO) 20 MG tablet TAKE 1 TABLET(20 MG) BY MOUTH EVERY DAY (Patient taking differently: Take 20 mg by mouth every evening.) 90 tablet 1    fluconazole (DIFLUCAN) 100 MG tablet Take 1 tablet (100 mg total) by mouth once daily. for 10 days 10 tablet 0    omeprazole (PRILOSEC) 40 MG capsule TAKE 1 CAPSULE(40 MG) BY MOUTH EVERY MORNING (Patient taking differently: Take 40 mg by mouth every morning.) 90 capsule 2    oxyCODONE-acetaminophen (PERCOCET)  mg per tablet Take 1 tablet by mouth every 4 to 6 hours as needed for Pain.      pregabalin (LYRICA) 200 MG Cap TAKE 1 CAPSULE(200 MG) BY MOUTH THREE TIMES DAILY (Patient taking differently: Take 200 mg by mouth 3 (three) times daily. TAKE 1 CAPSULE(200 MG) BY MOUTH THREE TIMES DAILY) 90 capsule 2    traMADoL (ULTRAM) 50 mg tablet Take 2 tablets by mouth every 12 (twelve) hours as needed for Pain.      amiodarone (PACERONE) 200 MG Tab Take 1 tablet (200 mg  "total) by mouth once daily. (Patient not taking: Reported on 12/6/2023) 30 tablet 1    apixaban (ELIQUIS) 2.5 mg Tab Take 1 tablet (2.5 mg total) by mouth 2 (two) times daily. 60 tablet 1    metoprolol tartrate (LOPRESSOR) 50 MG tablet Take 1 tablet (50 mg total) by mouth 3 (three) times daily. (Patient not taking: Reported on 12/6/2023) 90 tablet 1    sulfamethoxazole-trimethoprim 800-160mg (BACTRIM DS) 800-160 mg Tab Take 1 tablet by mouth 2 (two) times daily. for 14 days (Patient not taking: Reported on 1/16/2024) 28 tablet 0    [DISCONTINUED] pantoprazole (PROTONIX) 40 MG tablet Take 1 tablet (40 mg total) by mouth 2 (two) times daily. 60 tablet 4       SCHEDULED MEDS:   nystatin  500,000 Units Oral QID    piperacillin-tazobactam (Zosyn) IV (PEDS and ADULTS) (extended infusion is not appropriate)  4.5 g Intravenous Q8H    vancomycin (VANCOCIN) IV (PEDS and ADULTS)  750 mg Intravenous Q12H       CONTINUOUS INFUSIONS:   amiodarone in dextrose 5%      dilTIAZem Stopped (01/17/24 0500)       PRN MEDS:(Magic mouthwash) 1:1:1 diphenhydrAMINE(Benadryl) 12.5mg/5ml liq, aluminum & magnesium hydroxide-simethicone (Maalox), LIDOcaine viscous 2%, acetaminophen, HYDROmorphone, melatonin, ondansetron, sodium chloride 0.9%, Pharmacy to dose Vancomycin consult **AND** vancomycin - pharmacy to dose    LABS AND DIAGNOSTICS     CBC LAST 3 DAYS  Recent Labs   Lab 01/16/24  1437   WBC 28.57*   RBC 3.97*   HGB 9.4*   HCT 29.8*   MCV 75*   MCH 23.7*   MCHC 31.5*   RDW 16.0*   *   MPV 9.6   GRAN 88.8*  25.4*   LYMPH 2.8*  0.8*   MONO 4.7  1.3*   BASO 0.08   NRBC 0       COAGULATION LAST 3 DAYS  No results for input(s): "LABPT", "INR", "APTT" in the last 168 hours.    CHEMISTRY LAST 3 DAYS  Recent Labs   Lab 01/16/24  1437   *   K 3.7   CL 92*   CO2 23   ANIONGAP 13   BUN 15   CREATININE 0.6      CALCIUM 9.0   ALBUMIN 3.0*   PROT 7.5   ALKPHOS 243*   *   *   BILITOT 1.2*       CARDIAC PROFILE LAST 3 " "DAYS  Recent Labs   Lab 01/16/24  1437   *       ENDOCRINE LAST 3 DAYS  Recent Labs   Lab 01/16/24  1437   PROCAL 5.304*       LAST ARTERIAL BLOOD GAS  ABG  No results for input(s): "PH", "PO2", "PCO2", "HCO3", "BE" in the last 168 hours.    LAST 7 DAYS MICROBIOLOGY   Microbiology Results (last 7 days)       Procedure Component Value Units Date/Time    Blood culture x two cultures. Draw prior to antibiotics. [[id number]] Collected: 01/16/24 1413    Order Status: Completed Specimen: Blood Updated: 01/17/24 1632     Blood Culture, Routine No Growth to date      No Growth to date    Narrative:      Aerobic and anaerobic    Blood culture x two cultures. Draw prior to antibiotics. [[id number]] Collected: 01/16/24 1413    Order Status: Completed Specimen: Blood Updated: 01/17/24 1632     Blood Culture, Routine No Growth to date      No Growth to date    Narrative:      Aerobic and anaerobic    Urine culture [[id number]]  (Abnormal) Collected: 01/16/24 1519    Order Status: Completed Specimen: Urine Updated: 01/17/24 0827     Urine Culture, Routine STAPHYLOCOCCUS AUREUS  >100,000cfu/ml  Susceptibility pending      Narrative:      Specimen Source->Urine            MOST RECENT IMAGING  CT Abdomen Pelvis With IV Contrast NO Oral Contrast  CMS MANDATED QUALITY DATA - CT RADIATION - 436    One or more of the following dose-optimizing techniques was utilized for this exam: automated exposure control, adjustment of the mA and/or kV according to patient size, and/or use of iterative reconstruction technique.    HISTORY:Abdominal pain, acute, nonlocalized.    TECHNIQUE: Serial axial images were obtained from the domes of the diaphragm to the pubic symphysis with 100ccs Omnipaque 350 intravenous contrast. Oral contrast was not administered for this exam. Coronal and sagittal reconstructions were performed. Patient received 528 mGy-cm of radiation exposure from this exam.    COMPARISON: Comparison to previous study dated " December 6, 2023.    FINDINGS:  Lung bases: Stable appearing 3 mm noncalcified pulmonary nodule is noted in the peripheral aspect of the right lung base. Calcified granuloma is noted in the left lung base. No basilar infiltrate or pleural effusion is seen.    Liver: Liver appears normal in size. Scattered subcentimeter hepatic low densities are present most consistent with tiny cysts without significant interval change. Gallbladder appears nondistended.    Pancreas: Pancreas is unremarkable.    Spleen: Normal.    Adrenal glands: Mild thickening of the adrenal glands is noted without significant interval change.    Kidneys and ureters: Bilateral functioning kidneys are visualized. No hydronephrosis is seen. Nonobstructing 5 mm left lower pole intrarenal calculi is noted. Ureters appear nondilated.    Bladder: Bladder is unremarkable. Small calcified phleboliths are noted in the pelvis.    Bowel: Small hiatal hernia is present. Stomach and small bowel appear nondistended. Moderate volume of colonic fecal material is present. No imaging evidence of appendiceal inflammation is seen. Mild bowel wall thickening with minimal pericolonic inflammatory changes of the ascending colon is present suspicious for mild colitis. Consider follow-up colonoscopy. No evidence of bowel obstruction is seen.    Peritoneum: No free air or free fluid is seen.    Retroperitoneum: Unremarkable.    Lymph nodes: No evidence of abdominal or pelvic adenopathy is seen.    Abdominal wall: Peripherally calcified 1.6 cm mass of the right breast is again noted unchanged. Mild skin thickening is again visualized in the periumbilical region similar to previous exam.    Bones: Postop changes of the left hip is visualized with severe degenerative changes of the left hip joint with marked joint space narrowing. The left hip nail traverses the femoral head and extends into the acetabular roof. Interval development of multiple fluid collections are noted  superior to the left hip joint with hyperdense layering fluid within these fluid collection. Fluid collections collectively measure at least 9 x 4 cm in size. Severe degenerative changes of the lumbar spine are again visualized with levoscoliosis noted. Old bilateral fracture deformity of the pubis is noted.    IMPRESSION:  1. Mild bowel wall thickening and pericolonic inflammatory changes of the ascending colon suspicious for mild colitis. Consider follow-up colonoscopy.  2. Small hiatal hernia.  3. Postoperative changes of the left hip joint with severe degenerative changes noted. The left hip nail traverses the femoral head and extends into the left acetabular roof. Interval development of multiple fluid collections surrounding the left hip joint is noted measuring up to 9 cm in diameter with layering hyperdense fluid. These may represent multiple synovial cysts, hemorrhage, or even abscess. Minimal air is noted in the left hip joint. Recommend correlation with patient's clinical and treatment history regarding the left hip.  4. Severe levoscoliosis and degenerative changes of the lumbar spine.  5. Nonobstructing left lower pole intrarenal calculi.    Electronically signed by:  Richard Bernard MD  01/16/2024 04:43 PM [facility] Workstation: [id number]  X-Ray Hip 2 or 3 views Left (with Pelvis when performed)  Pelvis and left hip, 3 views    HISTORY: Hip pain.    Comparison is made to previous examinations including the study of 11/19/2023.    There has been previous placement of intramedullary monika within the proximal left femur for fixation of left femoral fracture. The component extending through the femoral head appears to extend beyond the superior cortex. There is deformity of the left femoral head which appears greater than on the previous examination. This is associated with narrowing of the joint space.    The right hip joint appears appropriately maintained.    There are multilevel degenerative disc and facet  changes within the lumbar spine associated with leftward curvature.    There is deformity of the left superior and inferior pubic rami and right pubic bone compatible with remote healed fractures.    IMPRESSION:    Apparent progression of deformity of the left femoral head associated with superior joint space narrowing. There appears to be extension of the fixating hardware beyond the expected cortex of the left femoral head. Consider further investigation with CT.    Healed pelvic fractures.    Multilevel degenerative changes within the spine associated with leftward curvature.    Electronically signed by:  Shahid Wellington MD  01/16/2024 03:14 PM CST Workstation: [id number]  X-Ray Chest AP Portable  Reason: Sepsis Weakness (Generalized X 2weeks); Hip Pain (Left )    FINDINGS:  Portable chest at 1451 compared to 12/6/2023 shows normal cardiomediastinal silhouette.    Lungs are clear. Pulmonary vasculature is normal. Chronic right rotator cuff deficiency evident degenerative changes of the right glenohumeral joint. No acute osseous abnormality.    IMPRESSION:  No acute cardiopulmonary abnormality.    Electronically signed by:  Bryan Stevens MD  01/16/2024 03:12 PM CST Workstation: [id number]      ECHOCARDIOGRAM RESULTS (last 5)  Results for orders placed during the hospital encounter of 06/14/22    Echo    Interpretation Summary  · The left ventricle is normal in size with concentric remodeling and normal systolic function.  · Moderate left atrial enlargement.  · The estimated ejection fraction is 50%.  · Indeterminate left ventricular diastolic function with normal left atrial pressure.  · Normal right ventricular size with normal right ventricular systolic function.  · Mild mitral regurgitation.  · Normal central venous pressure (3 mmHg).  · The estimated PA systolic pressure is 27 mmHg.      CURRENT/PREVIOUS VISIT EKG  Results for orders placed or performed during the hospital encounter of 01/16/24   EKG 12-lead     Collection Time: 01/17/24  7:07 AM    Narrative    Test Reason : I48.91,    Vent. Rate : 083 BPM     Atrial Rate : 083 BPM     P-R Int : 158 ms          QRS Dur : 088 ms      QT Int : 300 ms       P-R-T Axes : 027 004 020 degrees     QTc Int : 352 ms    Normal sinus rhythm  Nonspecific T wave abnormality  Abnormal ECG  When compared with ECG of 16-JAN-2024 16:27,  Sinus rhythm has replaced Atrial fibrillation  Vent. rate has decreased BY  75 BPM  ST no longer depressed in Anterior leads  Nonspecific T wave abnormality now evident in Anterior leads    Referred By: LEEANNE   SELF           Confirmed By:            ASSESSMENT/PLAN:     There are no active hospital problems to display for this patient.      ASSESSMENT & PLAN:   Atrial fibrillation, RVR  Paroxysmal atrial fibrillation  Septic arthritis Left hip  Hyponatremia  Transaminitis  Anemia    RECOMMENDATIONS:  Pt admitted with septic joint, left hip  EKG showed atrial fibrillation, RVR pt has converted to NSR  Resume home medications Eliquis, amiodarone, and metoprolol  4.   No recent echo on file. Obtain echocardiogram to evaluate LV and valvular function  5.   , Chest xray is clear/no pulmonary edema  6.   No recent TSH on file. Check thyroid function in AM  7.   Antibiotic therapy per hospital medicine  8.   Check and replace potassium and magnesium. Goal for potassium is 4.0, and goal for magnesium is 2.0.       Mayte Xie NP  [facility]  Department of Cardiology  Date of Service: 01/17/2024        I have personally interviewed and examined the patient, I have reviewed the Nurse Practitioner's history and physical, assessment, and plan. I agree with the findings and plan.  1. Paroxysmal atrial fibrillation   Patient converted to sinus rhythm.  Is stable at the present time.    2. Patient has significant septic arthritis and needs further evaluation by the Orthopedics.  And question of being transferred.  3. Resume her home  medications.  And patient is on Eliquis at home would switch her over to Lovenox in anticipation of hip surgery.      Dr. Rehan Davis M.D.  [facility]  Department of Cardiology  Date of Service: 01/17/2024  4:46 PM

## 2024-01-17 NOTE — HPI
Froilan Ray is a 78 year old female  with a PMHx of hypertension, seizures, iron-deficiency anemia, atrial fibrillation (on eliquis and amiodarone), s/p ORIF left hip, heart failure with preserved ejection fraction (50% on echocardiogram 12/22), and osteoporosis who presented to the ED at an OSH with uncontrolled left hip pain, decreased appetite and oral intake.  Per family at bedside, she has had chronic left hip pain ever since a left hip fracture and surgical repair a few years ago.  They said she fell a month or so ago and has increased pain since.  The pain has been worsening despite treatment with tramadol and Percocet.  She has been unable to ambulate for the last week.  She has decreased appetite and oral intake the last 2 weeks.  Of note, she is s/p surgery for a strangulated umbilical hernia 12/07.     In the ED, labs revealed WBC 28.57, H/H 9.4/29.8, , Procalcitonin 5.304, High sensitivity troponin 21.1, , lactic acid 2.2>>1.3, Na 128, T.bili 1.2, Alk phos 243, AST//158.  UA +3 leukocytes, >100 WBC, Few bacteria.   CT abdomen/pelvis: Mild bowel wall thickening and pericolonic inflammatory changes of the ascending colon suspicious for mild colitis. Small hiatal hernia. Postoperative changes of the left hip joint with severe degenerative changes noted. The left hip nail traverses the femoral head and extends into the left acetabular roof. Interval development of multiple fluid collections surrounding the left hip joint is noted measuring up to 9 cm in diameter with layering hyperdense fluid. These may represent multiple synovial cysts, hemorrhage, or even abscess. Minimal air is noted in the left hip joint. Recommend correlation with patient's clinical and treatment history regarding the left hip. Severe levoscoliosis and degenerative changes of the lumbar spine. Nonobstructing left lower pole intrarenal calculi. CXR no acute cardiopulmonary abnormality.     Patient developed AFib  with RVR, heart rate up into the 200s.  She remained stable, alert, asymptomatic, mildly hypotensive.  She was treated with 2 doses IV Cardizem,Cardizem drip, mild improvement with rates into the 160s-180s.  She then received 2 doses of IV metoprolol.  Heart rate still in the 160s. Cardiology, Dr. Correa, at OSH recommended a dose IV digoxin as well as loading amiodarone bolus and starting an amiodarone drip.

## 2024-01-17 NOTE — PROVIDER PROGRESS NOTES - EMERGENCY DEPT.
This 78-year-old who had presented with complaints of hip pain who also had AFib with RVR, was T'd been transferred to [facility] however at the time of my intervention at 6:27 a.m. this morning the patient has been off of Cardizem as well as amiodarone with a controlled ventricular rate but still in AFib.  The patient had an elevated white blood cell count 29255 and a CT scan was suggestive of a septic joint.  At this point [facility] suggested that the patient not be sent to them that our local orthopedist intervene.  During the ED course the patient was given repeated Zosyn and vancomycin.  She also received analgesics for pain control.  Dr. Vera orthopedist who was contacted yesterday about this patient was again spoken to and he emphatically declined intervening on this patient stated that was now with in his scope of practice.  At this point the decision is still he had unclear as to whether or not [facility] will accept the patient in transfer.  Of note she had had that hip surgery done 5 years ago by Dr. Barajas at [facility].  She had had no history any recent injury or trauma.  Currently we are awaiting a decision as to whether not the patient will be transferred to [facility] versus intervention here in the [place] area.  Encounter Date: 1/16/2024    ED Physician Progress Notes

## 2024-01-18 PROBLEM — M00.052 STAPHYLOCOCCAL ARTHRITIS OF LEFT HIP: Status: ACTIVE | Noted: 2024-01-18

## 2024-01-18 PROBLEM — B95.62 MRSA BACTEREMIA: Status: ACTIVE | Noted: 2024-01-18

## 2024-01-18 PROBLEM — T84.84XA PAINFUL ORTHOPAEDIC HARDWARE: Status: ACTIVE | Noted: 2024-01-18

## 2024-01-18 PROBLEM — A41.02 SEPSIS DUE TO METHICILLIN RESISTANT STAPHYLOCOCCUS AUREUS (MRSA): Status: ACTIVE | Noted: 2022-06-15

## 2024-01-18 PROBLEM — K13.70 ORAL LESION: Status: ACTIVE | Noted: 2024-01-18

## 2024-01-18 PROBLEM — R78.81 MRSA BACTEREMIA: Status: ACTIVE | Noted: 2024-01-18

## 2024-01-18 LAB
ABO + RH BLD: NORMAL
ALBUMIN SERPL BCP-MCNC: 1.6 G/DL (ref 3.5–5.2)
ALP SERPL-CCNC: 243 U/L (ref 55–135)
ALT SERPL W/O P-5'-P-CCNC: 75 U/L (ref 10–44)
ANION GAP SERPL CALC-SCNC: 11 MMOL/L (ref 8–16)
APPEARANCE FLD: NORMAL
ASCENDING AORTA: 3.51 CM
AST SERPL-CCNC: 63 U/L (ref 10–40)
AV INDEX (PROSTH): 1.23
AV MEAN GRADIENT: 3 MMHG
AV PEAK GRADIENT: 5 MMHG
AV VALVE AREA BY VELOCITY RATIO: 3.72 CM²
AV VALVE AREA: 3.82 CM²
AV VELOCITY RATIO: 1.2
BACTERIA UR CULT: ABNORMAL
BASOPHILS # BLD AUTO: 0.08 K/UL (ref 0–0.2)
BASOPHILS NFR BLD: 0.4 % (ref 0–1.9)
BILIRUB SERPL-MCNC: 1 MG/DL (ref 0.1–1)
BLD GP AB SCN CELLS X3 SERPL QL: NORMAL
BODY FLD TYPE: NORMAL
BODY FLD TYPE: NORMAL
BSA FOR ECHO PROCEDURE: 1.77 M2
BUN SERPL-MCNC: 13 MG/DL (ref 8–23)
CALCIUM SERPL-MCNC: 8.7 MG/DL (ref 8.7–10.5)
CHLORIDE SERPL-SCNC: 100 MMOL/L (ref 95–110)
CO2 SERPL-SCNC: 20 MMOL/L (ref 23–29)
COLOR FLD: NORMAL
CREAT SERPL-MCNC: 0.8 MG/DL (ref 0.5–1.4)
CRP SERPL-MCNC: 239.9 MG/L (ref 0–8.2)
CRYSTALS FLD MICRO: NEGATIVE
CV ECHO LV RWT: 0.44 CM
DIFFERENTIAL METHOD BLD: ABNORMAL
DOP CALC AO PEAK VEL: 1.07 M/S
DOP CALC AO VTI: 17.78 CM
DOP CALC LVOT AREA: 3.1 CM2
DOP CALC LVOT DIAMETER: 1.99 CM
DOP CALC LVOT PEAK VEL: 1.28 M/S
DOP CALC LVOT STROKE VOLUME: 67.89 CM3
DOP CALCLVOT PEAK VEL VTI: 21.84 CM
E WAVE DECELERATION TIME: 237.39 MSEC
E/A RATIO: 0.67
E/E' RATIO: 11.54 M/S
ECHO LV POSTERIOR WALL: 0.9 CM (ref 0.6–1.1)
EOSINOPHIL # BLD AUTO: 0 K/UL (ref 0–0.5)
EOSINOPHIL NFR BLD: 0 % (ref 0–8)
ERYTHROCYTE [DISTWIDTH] IN BLOOD BY AUTOMATED COUNT: 16.8 % (ref 11.5–14.5)
ERYTHROCYTE [SEDIMENTATION RATE] IN BLOOD BY PHOTOMETRIC METHOD: >120 MM/HR (ref 0–36)
EST. GFR  (NO RACE VARIABLE): >60 ML/MIN/1.73 M^2
FRACTIONAL SHORTENING: 32 % (ref 28–44)
GLUCOSE SERPL-MCNC: 92 MG/DL (ref 70–110)
GRAM STN SPEC: NORMAL
HCT VFR BLD AUTO: 26.2 % (ref 37–48.5)
HGB BLD-MCNC: 7.7 G/DL (ref 12–16)
IMM GRANULOCYTES # BLD AUTO: 0.44 K/UL (ref 0–0.04)
IMM GRANULOCYTES NFR BLD AUTO: 2.1 % (ref 0–0.5)
INTERVENTRICULAR SEPTUM: 1.04 CM (ref 0.6–1.1)
LA MAJOR: 5.02 CM
LA MINOR: 4.98 CM
LA WIDTH: 3.03 CM
LEFT ATRIUM SIZE: 3.32 CM
LEFT ATRIUM VOLUME INDEX MOD: 22 ML/M2
LEFT ATRIUM VOLUME INDEX: 24.3 ML/M2
LEFT ATRIUM VOLUME MOD: 38.7 CM3
LEFT ATRIUM VOLUME: 42.75 CM3
LEFT INTERNAL DIMENSION IN SYSTOLE: 2.79 CM (ref 2.1–4)
LEFT VENTRICLE DIASTOLIC VOLUME INDEX: 41.9 ML/M2
LEFT VENTRICLE DIASTOLIC VOLUME: 73.75 ML
LEFT VENTRICLE MASS INDEX: 72 G/M2
LEFT VENTRICLE SYSTOLIC VOLUME INDEX: 16.6 ML/M2
LEFT VENTRICLE SYSTOLIC VOLUME: 29.22 ML
LEFT VENTRICULAR INTERNAL DIMENSION IN DIASTOLE: 4.09 CM (ref 3.5–6)
LEFT VENTRICULAR MASS: 126.1 G
LV LATERAL E/E' RATIO: 10.71 M/S
LV SEPTAL E/E' RATIO: 12.5 M/S
LYMPHOCYTES # BLD AUTO: 1.4 K/UL (ref 1–4.8)
LYMPHOCYTES NFR BLD: 6.6 % (ref 18–48)
MCH RBC QN AUTO: 23.5 PG (ref 27–31)
MCHC RBC AUTO-ENTMCNC: 29.4 G/DL (ref 32–36)
MCV RBC AUTO: 80 FL (ref 82–98)
MONOCYTES # BLD AUTO: 1.4 K/UL (ref 0.3–1)
MONOCYTES NFR BLD: 6.5 % (ref 4–15)
MV PEAK A VEL: 1.12 M/S
MV PEAK E VEL: 0.75 M/S
MV STENOSIS PRESSURE HALF TIME: 68.84 MS
MV VALVE AREA P 1/2 METHOD: 3.2 CM2
NEUTROPHILS # BLD AUTO: 17.9 K/UL (ref 1.8–7.7)
NEUTROPHILS NFR BLD: 84.4 % (ref 38–73)
NEUTROPHILS NFR FLD MANUAL: 100 %
NRBC BLD-RTO: 0 /100 WBC
PATH INTERP FLD-IMP: NORMAL
PISA TR MAX VEL: 2.34 M/S
PLATELET # BLD AUTO: 578 K/UL (ref 150–450)
PMV BLD AUTO: 9.9 FL (ref 9.2–12.9)
POTASSIUM SERPL-SCNC: 3.7 MMOL/L (ref 3.5–5.1)
PROT SERPL-MCNC: 6 G/DL (ref 6–8.4)
RA MAJOR: 4.3 CM
RA PRESSURE ESTIMATED: 3 MMHG
RA WIDTH: 3.71 CM
RBC # BLD AUTO: 3.28 M/UL (ref 4–5.4)
RIGHT VENTRICULAR END-DIASTOLIC DIMENSION: 4.05 CM
RV TB RVSP: 5 MMHG
RV TISSUE DOPPLER FREE WALL SYSTOLIC VELOCITY 1 (APICAL 4 CHAMBER VIEW): 10.1 CM/S
SINUS: 3.37 CM
SODIUM SERPL-SCNC: 131 MMOL/L (ref 136–145)
SPECIMEN OUTDATE: NORMAL
STJ: 3.15 CM
TDI LATERAL: 0.07 M/S
TDI SEPTAL: 0.06 M/S
TDI: 0.07 M/S
TR MAX PG: 22 MMHG
TRICUSPID ANNULAR PLANE SYSTOLIC EXCURSION: 1.72 CM
TV REST PULMONARY ARTERY PRESSURE: 25 MMHG
VANCOMYCIN TROUGH SERPL-MCNC: 29.6 UG/ML (ref 10–22)
WBC # BLD AUTO: 21.27 K/UL (ref 3.9–12.7)
WBC # FLD: NORMAL /CU MM
Z-SCORE OF LEFT VENTRICULAR DIMENSION IN END DIASTOLE: -1.74
Z-SCORE OF LEFT VENTRICULAR DIMENSION IN END SYSTOLE: -0.6

## 2024-01-18 PROCEDURE — 85652 RBC SED RATE AUTOMATED: CPT

## 2024-01-18 PROCEDURE — 87116 MYCOBACTERIA CULTURE: CPT

## 2024-01-18 PROCEDURE — 86140 C-REACTIVE PROTEIN: CPT

## 2024-01-18 PROCEDURE — 20600001 HC STEP DOWN PRIVATE ROOM

## 2024-01-18 PROCEDURE — 86920 COMPATIBILITY TEST SPIN: CPT | Performed by: STUDENT IN AN ORGANIZED HEALTH CARE EDUCATION/TRAINING PROGRAM

## 2024-01-18 PROCEDURE — 63600175 PHARM REV CODE 636 W HCPCS: Performed by: STUDENT IN AN ORGANIZED HEALTH CARE EDUCATION/TRAINING PROGRAM

## 2024-01-18 PROCEDURE — 25000003 PHARM REV CODE 250: Performed by: STUDENT IN AN ORGANIZED HEALTH CARE EDUCATION/TRAINING PROGRAM

## 2024-01-18 PROCEDURE — 25500020 PHARM REV CODE 255: Performed by: STUDENT IN AN ORGANIZED HEALTH CARE EDUCATION/TRAINING PROGRAM

## 2024-01-18 PROCEDURE — 87205 SMEAR GRAM STAIN: CPT | Mod: 59

## 2024-01-18 PROCEDURE — 89060 EXAM SYNOVIAL FLUID CRYSTALS: CPT

## 2024-01-18 PROCEDURE — 0S9B3ZX DRAINAGE OF LEFT HIP JOINT, PERCUTANEOUS APPROACH, DIAGNOSTIC: ICD-10-PCS | Performed by: STUDENT IN AN ORGANIZED HEALTH CARE EDUCATION/TRAINING PROGRAM

## 2024-01-18 PROCEDURE — 36415 COLL VENOUS BLD VENIPUNCTURE: CPT | Performed by: STUDENT IN AN ORGANIZED HEALTH CARE EDUCATION/TRAINING PROGRAM

## 2024-01-18 PROCEDURE — 63600175 PHARM REV CODE 636 W HCPCS: Performed by: HOSPITALIST

## 2024-01-18 PROCEDURE — P9016 RBC LEUKOCYTES REDUCED: HCPCS | Performed by: STUDENT IN AN ORGANIZED HEALTH CARE EDUCATION/TRAINING PROGRAM

## 2024-01-18 PROCEDURE — 87186 SC STD MICRODIL/AGAR DIL: CPT

## 2024-01-18 PROCEDURE — 87075 CULTR BACTERIA EXCEPT BLOOD: CPT | Mod: 59

## 2024-01-18 PROCEDURE — 89051 BODY FLUID CELL COUNT: CPT

## 2024-01-18 PROCEDURE — 87102 FUNGUS ISOLATION CULTURE: CPT | Mod: 59 | Performed by: STUDENT IN AN ORGANIZED HEALTH CARE EDUCATION/TRAINING PROGRAM

## 2024-01-18 PROCEDURE — 87529 HSV DNA AMP PROBE: CPT | Mod: 59 | Performed by: STUDENT IN AN ORGANIZED HEALTH CARE EDUCATION/TRAINING PROGRAM

## 2024-01-18 PROCEDURE — 80053 COMPREHEN METABOLIC PANEL: CPT | Performed by: STUDENT IN AN ORGANIZED HEALTH CARE EDUCATION/TRAINING PROGRAM

## 2024-01-18 PROCEDURE — 87077 CULTURE AEROBIC IDENTIFY: CPT | Mod: 59 | Performed by: STUDENT IN AN ORGANIZED HEALTH CARE EDUCATION/TRAINING PROGRAM

## 2024-01-18 PROCEDURE — 85025 COMPLETE CBC W/AUTO DIFF WBC: CPT | Performed by: STUDENT IN AN ORGANIZED HEALTH CARE EDUCATION/TRAINING PROGRAM

## 2024-01-18 PROCEDURE — 87206 SMEAR FLUORESCENT/ACID STAI: CPT | Mod: 91

## 2024-01-18 PROCEDURE — 36415 COLL VENOUS BLD VENIPUNCTURE: CPT | Mod: XB | Performed by: HOSPITALIST

## 2024-01-18 PROCEDURE — 87070 CULTURE OTHR SPECIMN AEROBIC: CPT

## 2024-01-18 PROCEDURE — 87070 CULTURE OTHR SPECIMN AEROBIC: CPT | Mod: 59 | Performed by: STUDENT IN AN ORGANIZED HEALTH CARE EDUCATION/TRAINING PROGRAM

## 2024-01-18 PROCEDURE — 99223 1ST HOSP IP/OBS HIGH 75: CPT | Mod: 25,,,

## 2024-01-18 PROCEDURE — 86850 RBC ANTIBODY SCREEN: CPT | Performed by: STUDENT IN AN ORGANIZED HEALTH CARE EDUCATION/TRAINING PROGRAM

## 2024-01-18 PROCEDURE — 87015 SPECIMEN INFECT AGNT CONCNTJ: CPT | Performed by: STUDENT IN AN ORGANIZED HEALTH CARE EDUCATION/TRAINING PROGRAM

## 2024-01-18 PROCEDURE — 87075 CULTR BACTERIA EXCEPT BLOOD: CPT | Performed by: STUDENT IN AN ORGANIZED HEALTH CARE EDUCATION/TRAINING PROGRAM

## 2024-01-18 PROCEDURE — 87102 FUNGUS ISOLATION CULTURE: CPT

## 2024-01-18 PROCEDURE — 87186 SC STD MICRODIL/AGAR DIL: CPT | Mod: 59 | Performed by: STUDENT IN AN ORGANIZED HEALTH CARE EDUCATION/TRAINING PROGRAM

## 2024-01-18 PROCEDURE — 25000003 PHARM REV CODE 250: Performed by: HOSPITALIST

## 2024-01-18 PROCEDURE — 36415 COLL VENOUS BLD VENIPUNCTURE: CPT | Mod: XB | Performed by: STUDENT IN AN ORGANIZED HEALTH CARE EDUCATION/TRAINING PROGRAM

## 2024-01-18 PROCEDURE — 87040 BLOOD CULTURE FOR BACTERIA: CPT | Mod: 59 | Performed by: STUDENT IN AN ORGANIZED HEALTH CARE EDUCATION/TRAINING PROGRAM

## 2024-01-18 PROCEDURE — 87015 SPECIMEN INFECT AGNT CONCNTJ: CPT | Mod: 59

## 2024-01-18 PROCEDURE — 87205 SMEAR GRAM STAIN: CPT | Performed by: STUDENT IN AN ORGANIZED HEALTH CARE EDUCATION/TRAINING PROGRAM

## 2024-01-18 PROCEDURE — 87206 SMEAR FLUORESCENT/ACID STAI: CPT | Performed by: STUDENT IN AN ORGANIZED HEALTH CARE EDUCATION/TRAINING PROGRAM

## 2024-01-18 PROCEDURE — 80202 ASSAY OF VANCOMYCIN: CPT | Performed by: HOSPITALIST

## 2024-01-18 PROCEDURE — 87077 CULTURE AEROBIC IDENTIFY: CPT

## 2024-01-18 PROCEDURE — 87116 MYCOBACTERIA CULTURE: CPT | Mod: 59 | Performed by: STUDENT IN AN ORGANIZED HEALTH CARE EDUCATION/TRAINING PROGRAM

## 2024-01-18 RX ORDER — LIDOCAINE HYDROCHLORIDE 20 MG/ML
5 SOLUTION OROPHARYNGEAL EVERY 4 HOURS
Status: DISCONTINUED | OUTPATIENT
Start: 2024-01-18 | End: 2024-01-29 | Stop reason: HOSPADM

## 2024-01-18 RX ORDER — GUAIFENESIN 100 MG/5ML
200 SOLUTION ORAL EVERY 4 HOURS PRN
Status: DISCONTINUED | OUTPATIENT
Start: 2024-01-18 | End: 2024-01-29 | Stop reason: HOSPADM

## 2024-01-18 RX ORDER — FENTANYL CITRATE 50 UG/ML
INJECTION, SOLUTION INTRAMUSCULAR; INTRAVENOUS
Status: COMPLETED | OUTPATIENT
Start: 2024-01-18 | End: 2024-01-18

## 2024-01-18 RX ORDER — HEPARIN SODIUM 5000 [USP'U]/ML
5000 INJECTION, SOLUTION INTRAVENOUS; SUBCUTANEOUS EVERY 8 HOURS
Status: DISCONTINUED | OUTPATIENT
Start: 2024-01-19 | End: 2024-01-18

## 2024-01-18 RX ORDER — DIPHENHYDRAMINE HYDROCHLORIDE 50 MG/ML
INJECTION INTRAMUSCULAR; INTRAVENOUS
Status: COMPLETED | OUTPATIENT
Start: 2024-01-18 | End: 2024-01-18

## 2024-01-18 RX ORDER — MIDAZOLAM HYDROCHLORIDE 1 MG/ML
INJECTION INTRAMUSCULAR; INTRAVENOUS
Status: COMPLETED | OUTPATIENT
Start: 2024-01-18 | End: 2024-01-18

## 2024-01-18 RX ORDER — OXYCODONE HYDROCHLORIDE 5 MG/1
5 TABLET ORAL
Status: DISCONTINUED | OUTPATIENT
Start: 2024-01-18 | End: 2024-01-24

## 2024-01-18 RX ORDER — OXYCODONE HYDROCHLORIDE 10 MG/1
10 TABLET ORAL
Status: DISCONTINUED | OUTPATIENT
Start: 2024-01-18 | End: 2024-01-24

## 2024-01-18 RX ORDER — LIDOCAINE HYDROCHLORIDE 10 MG/ML
INJECTION INFILTRATION; PERINEURAL
Status: COMPLETED | OUTPATIENT
Start: 2024-01-18 | End: 2024-01-18

## 2024-01-18 RX ORDER — METHOCARBAMOL 500 MG/1
500 TABLET, FILM COATED ORAL 3 TIMES DAILY
Status: DISCONTINUED | OUTPATIENT
Start: 2024-01-18 | End: 2024-01-29 | Stop reason: HOSPADM

## 2024-01-18 RX ORDER — DIPHENHYDRAMINE HYDROCHLORIDE 50 MG/ML
INJECTION INTRAMUSCULAR; INTRAVENOUS
Status: DISPENSED
Start: 2024-01-18 | End: 2024-01-19

## 2024-01-18 RX ORDER — ACETAMINOPHEN 500 MG
1000 TABLET ORAL EVERY 6 HOURS
Status: DISPENSED | OUTPATIENT
Start: 2024-01-19 | End: 2024-01-20

## 2024-01-18 RX ORDER — HYDROMORPHONE HYDROCHLORIDE 1 MG/ML
0.5 INJECTION, SOLUTION INTRAMUSCULAR; INTRAVENOUS; SUBCUTANEOUS
Status: DISCONTINUED | OUTPATIENT
Start: 2024-01-18 | End: 2024-01-24

## 2024-01-18 RX ADMIN — NYSTATIN 500000 UNITS: 100000 SUSPENSION ORAL at 09:01

## 2024-01-18 RX ADMIN — FENTANYL CITRATE 100 MCG: 50 INJECTION, SOLUTION INTRAMUSCULAR; INTRAVENOUS at 09:01

## 2024-01-18 RX ADMIN — PIPERACILLIN SODIUM AND TAZOBACTAM SODIUM 4.5 G: 4; .5 INJECTION, POWDER, FOR SOLUTION INTRAVENOUS at 08:01

## 2024-01-18 RX ADMIN — MUPIROCIN: 20 OINTMENT TOPICAL at 09:01

## 2024-01-18 RX ADMIN — ESCITALOPRAM OXALATE 20 MG: 5 TABLET, FILM COATED ORAL at 12:01

## 2024-01-18 RX ADMIN — PREGABALIN 200 MG: 100 CAPSULE ORAL at 09:01

## 2024-01-18 RX ADMIN — METHOCARBAMOL 500 MG: 500 TABLET ORAL at 09:01

## 2024-01-18 RX ADMIN — OXYCODONE AND ACETAMINOPHEN 1 TABLET: 10; 325 TABLET ORAL at 06:01

## 2024-01-18 RX ADMIN — DIPHENHYDRAMINE HYDROCHLORIDE 25 MG: 50 INJECTION, SOLUTION INTRAMUSCULAR; INTRAVENOUS at 10:01

## 2024-01-18 RX ADMIN — MIDAZOLAM HYDROCHLORIDE 1 MG: 2 INJECTION, SOLUTION INTRAMUSCULAR; INTRAVENOUS at 09:01

## 2024-01-18 RX ADMIN — PREGABALIN 200 MG: 100 CAPSULE ORAL at 08:01

## 2024-01-18 RX ADMIN — NYSTATIN 500000 UNITS: 100000 SUSPENSION ORAL at 12:01

## 2024-01-18 RX ADMIN — METOPROLOL TARTRATE 50 MG: 50 TABLET, FILM COATED ORAL at 09:01

## 2024-01-18 RX ADMIN — PREGABALIN 200 MG: 100 CAPSULE ORAL at 03:01

## 2024-01-18 RX ADMIN — VANCOMYCIN HYDROCHLORIDE 1000 MG: 1 INJECTION, POWDER, LYOPHILIZED, FOR SOLUTION INTRAVENOUS at 01:01

## 2024-01-18 RX ADMIN — PIPERACILLIN SODIUM AND TAZOBACTAM SODIUM 4.5 G: 4; .5 INJECTION, POWDER, FOR SOLUTION INTRAVENOUS at 04:01

## 2024-01-18 RX ADMIN — FENTANYL CITRATE 50 MCG: 50 INJECTION, SOLUTION INTRAMUSCULAR; INTRAVENOUS at 10:01

## 2024-01-18 RX ADMIN — LIDOCAINE HYDROCHLORIDE 5 ML: 20 SOLUTION ORAL; TOPICAL at 10:01

## 2024-01-18 RX ADMIN — APIXABAN 2.5 MG: 2.5 TABLET, FILM COATED ORAL at 12:01

## 2024-01-18 RX ADMIN — MIDAZOLAM HYDROCHLORIDE 1 MG: 2 INJECTION, SOLUTION INTRAMUSCULAR; INTRAVENOUS at 10:01

## 2024-01-18 RX ADMIN — AMLODIPINE BESYLATE 5 MG: 5 TABLET ORAL at 12:01

## 2024-01-18 RX ADMIN — METOPROLOL TARTRATE 50 MG: 50 TABLET, FILM COATED ORAL at 12:01

## 2024-01-18 RX ADMIN — PIPERACILLIN SODIUM AND TAZOBACTAM SODIUM 4.5 G: 4; .5 INJECTION, POWDER, FOR SOLUTION INTRAVENOUS at 12:01

## 2024-01-18 RX ADMIN — DOCUSATE SODIUM 100 MG: 100 CAPSULE, LIQUID FILLED ORAL at 12:01

## 2024-01-18 RX ADMIN — HUMAN ALBUMIN MICROSPHERES AND PERFLUTREN 0.11 MG: 10; .22 INJECTION, SOLUTION INTRAVENOUS at 05:01

## 2024-01-18 RX ADMIN — DOCUSATE SODIUM 100 MG: 100 CAPSULE, LIQUID FILLED ORAL at 09:01

## 2024-01-18 RX ADMIN — AMIODARONE HYDROCHLORIDE 200 MG: 200 TABLET ORAL at 08:01

## 2024-01-18 RX ADMIN — LIDOCAINE HYDROCHLORIDE 10 ML: 10 INJECTION, SOLUTION INFILTRATION; PERINEURAL at 10:01

## 2024-01-18 NOTE — NURSING
Pt arrived to 173 for left hip joint aspiration. Pt oriented to unit and staff. Plan of care reviewed with patient, patient verbalizes understanding. Comfort measures utilized. Pt safely transferred from stretcher to procedural table. Fall risk reviewed with patient, fall risk interventions maintained. Safety strap applied, positioner pillows utilized to minimize pressure points. Blankets applied. Pt prepped and draped utilizing standard sterile technique. Patient placed on continuous monitoring, as required by sedation policy. Timeouts completed utilizing standard universal time-out, per department and facility policy. RN to remain at bedside, continuous monitoring maintained. Pt resting comfortably. Denies pain/discomfort. Will continue to monitor. See flow sheets for monitoring, medication administration, and updates.

## 2024-01-18 NOTE — PROCEDURES
IR Post-Procedure Note      Pre Op Diagnosis:  septic arthritis    Post Op Diagnosis:  same    Procedure:  Image guided aspiration of the L hip joint and soft collection     Procedure performed by:  Raysa Hadley MD  /  Chad Villarreal MD    Written Informed Consent Obtained: Yes    Specimen Removed:  Yes; aspirate from fluid collection    Estimated Blood Loss: minimal    Findings:    CT was used for localization of abnormal fluid collection.     Successful aspiration of the L hip joint and L hip soft tissue collection. Specimens were sent to the lab for further analysis and culture.    10 cc of purulent fluid was aspirated from the L hip joint.    65 cc of purulent fluid was aspirated from the L hip soft tissue.    The patient tolerated procedure well and there were no complications. Please see procedure report under Imaging for further details.    Plan:    Sent specimen to lab for analysis.      Raysa Hadley MD MSCR  PGY-5 Radiology Resident

## 2024-01-18 NOTE — CONSULTS
Percutaneous Joint Aspiration Consult Note  Interventional Radiology    Date: 1/18/2024   Primary team: [facility] MED Julito CASTILLO Janet R., MD   Room/bed: 8075/8075 A    Inpatient consult to Interventional Radiology  Consult performed by: Shiloh Parson PA-C  Consult ordered by: OTF Bustos MD           SUBJECTIVE:     Chief Complaint:  MRSA bacteremia, c/f septic arthritis of the L hip    History of Present Illness:  Froilan Ray is a 78 y.o. female with a past medical history of CHF, chronic pain on opioids, peripheral neuropathy, L-intertroch fx s/p TFNA (12/11/2018, Dr. Eulalio De La Cruz) who was admitted on 1/17/24 for MRSA bacteremia. Pt has had recent imaging including a CT AP on 1/16/24 which revealed multiple fluid collections surrounding the left hip joint. The pt's WBC on admission was 27 and she was febrile to 100.6F in the ED. Blood cultures growing MRSA, urine culture with MRSA as well.  Pt evaluated with ortho with recs for IR guided aspiration prior to any surgical intervention.     .Interventional Radiology consulted for image guided percutaneous aspiration of the L hip.    Review of Systems   Constitutional:  Positive for fever.   HENT: Negative.     Respiratory: Negative.     Cardiovascular: Negative.    Gastrointestinal: Negative.    Musculoskeletal:  Positive for joint pain (L hip pain).   Skin: Negative.    Neurological: Negative.    Psychiatric/Behavioral: Negative.          Scheduled Meds:   amiodarone  200 mg Oral Daily    amLODIPine  5 mg Oral Daily    apixaban  2.5 mg Oral BID    docusate sodium  100 mg Oral BID    EScitalopram oxalate  20 mg Oral Daily    metoprolol tartrate  50 mg Oral BID    mupirocin   Nasal BID    nystatin  500,000 Units Oral QID    piperacillin-tazobactam (Zosyn) IV (PEDS and ADULTS) (extended infusion is not appropriate)  4.5 g Intravenous Q8H    pregabalin  200 mg Oral TID    vancomycin (VANCOCIN) IV (PEDS and ADULTS)  15 mg/kg Intravenous Q12H     Continuous  Infusions:  PRN Meds:(Magic mouthwash) 1:1:1 diphenhydrAMINE(Benadryl) 12.5mg/5ml liq, aluminum & magnesium hydroxide-simethicone (Maalox), LIDOcaine viscous 2%, acetaminophen, acetaminophen, aluminum-magnesium hydroxide-simethicone, melatonin, naloxone, ondansetron, oxyCODONE-acetaminophen, polyethylene glycol, simethicone, sodium chloride 0.9%, Pharmacy to dose Vancomycin consult **AND** vancomycin - pharmacy to dose    Review of patient's allergies indicates:  No Known Allergies    Past Medical History:   Diagnosis Date    Anemia     Arthritis     Bleeding ulcer 07/2016    Chronic pain     DDD (degenerative disc disease), cervical     DDD (degenerative disc disease), lumbar     Depression     Disc degeneration, lumbosacral     Diverticulitis     Encounter for blood transfusion     Hypertension     IBS (irritable bowel syndrome)     Neuropathy of both feet     Seizures     none  since 2017    Umbilical hernia 2020     Past Surgical History:   Procedure Laterality Date    BACK SURGERY      BREAST BIOPSY      BREAST SURGERY Right     lumpectomy    CAUDAL EPIDURAL STEROID INJECTION N/A 01/28/2022    Procedure: Injection-steroid-epidural-caudal;  Surgeon: Luzmaria Marcelino MD;  Location: [facility];  Service: Pain Management;  Laterality: N/A;    COLONOSCOPY N/A 01/14/2022    Procedure: COLONOSCOPY;  Surgeon: Abby Landers MD;  Location: [facility];  Service: Endoscopy;  Laterality: N/A;    ENDOSCOPIC ULTRASOUND OF UPPER GASTROINTESTINAL TRACT N/A 07/21/2020    Procedure: ULTRASOUND, UPPER GI TRACT, ENDOSCOPIC;  Surgeon: Marcio Nguyễn III, MD;  Location: [facility];  Service: Endoscopy;  Laterality: N/A;    ESOPHAGOGASTRODUODENOSCOPY N/A 01/14/2022    Procedure: EGD (ESOPHAGOGASTRODUODENOSCOPY);  Surgeon: Abby Landers MD;  Location: [facility];  Service: Endoscopy;  Laterality: N/A;    ESOPHAGOGASTRODUODENOSCOPY N/A 06/10/2022    Procedure: EGD (ESOPHAGOGASTRODUODENOSCOPY);  Surgeon: Abby Landers MD;   Location: [facility] ENDO;  Service: Endoscopy;  Laterality: N/A;    EYE SURGERY      cataract    HYSTERECTOMY      INTRAMEDULLARY RODDING OF TROCHANTER OF FEMUR Left 12/11/2018    Procedure: INSERTION, INTRAMEDULLARY SANTOS, FEMUR, TROCHANTER;  Surgeon: Eulalio De La Cruz MD;  Location: [facility] OR;  Service: Orthopedics;  Laterality: Left;    REPAIR OF EPIGASTRIC HERNIA N/A 12/7/2023    Procedure: REPAIR, HERNIA, EPIGASTRIC;  Surgeon: Vipul Escobar MD;  Location: [facility] OR;  Service: General;  Laterality: N/A;    SPINAL CORD STIMULATOR IMPLANT  09/18/2013    and removal    SPINE SURGERY  2006    lumbar L2-S1 decompression.    SPINE SURGERY      cervical decompression    TONSILLECTOMY       Family History   Problem Relation Age of Onset    Diabetes Mother     Hypertension Mother     Irritable bowel syndrome Mother     Diabetes Father         insulin dependent    Hypertension Father     Heart disease Father     Coronary artery disease Father     Depression Father     Diabetes Sister     Diabetes Brother     COPD Brother      Social History     Tobacco Use    Smoking status: Never    Smokeless tobacco: Never   Substance Use Topics    Alcohol use: No    Drug use: No       OBJECTIVE:     Vital Signs (Most Recent)  Temp: 98.3 °F (36.8 °C) (01/18/24 0724)  Pulse: 84 (01/18/24 0724)  Resp: 19 (01/18/24 0724)  BP: (!) 143/59 (01/18/24 0724)  SpO2: 95 % (01/18/24 0724)    Physical Exam:   Physical Exam  Constitutional:       General: She is not in acute distress.     Comments: Elderly appearing female   HENT:      Head: Normocephalic.   Cardiovascular:      Rate and Rhythm: Normal rate.   Pulmonary:      Effort: Pulmonary effort is normal.   Abdominal:      General: Abdomen is flat.   Neurological:      Mental Status: She is alert and oriented to person, place, and time. Mental status is at baseline.   Psychiatric:         Mood and Affect: Mood normal.         Behavior: Behavior normal.         Laboratory  Lab Results   Component  Value Date    INR 1.1 11/30/2022       Lab Results   Component Value Date    WBC 21.27 (H) 01/18/2024    HGB 7.7 (L) 01/18/2024    HCT 26.2 (L) 01/18/2024    MCV 80 (L) 01/18/2024     (H) 01/18/2024      Lab Results   Component Value Date    GLU 92 01/18/2024     (L) 01/18/2024    K 3.7 01/18/2024     01/18/2024    CO2 20 (L) 01/18/2024    BUN 13 01/18/2024    CREATININE 0.8 01/18/2024    CALCIUM 8.7 01/18/2024    MG 1.8 12/06/2023    ALT 75 (H) 01/18/2024    AST 63 (H) 01/18/2024    ALBUMIN 1.6 (L) 01/18/2024    BILITOT 1.0 01/18/2024    BILIDIR <0.1 11/17/2004       ASA/Mallampati  ASA: 3  Mallampati: 2    Imaging:  Recent imaging studies reviewed by Chad Villarreal MD.     ASSESSMENT/PLAN:     Assessment:  78 y.o. female with a past medical history of L-intertroch fx s/p TFNA (12/11/2018, Dr. Eulalio De La Cruz) who has been referred to IR for image guided percutaneous L hip aspiration.     Plan:  Will proceed with CT guided L hip aspiration on 1/18/2024.   Sedation plan: local  Ordering team to place orders for labs/micro send off.   Anticoagulation history reviewed.   Coagulation labs reviewed.  Thank you for the consult. Please contact with questions via [employer] secure chat.    Shiloh Parson PA-C  Interventional Radiology  Spectra [phone]  1/18/2024

## 2024-01-18 NOTE — ASSESSMENT & PLAN NOTE
Froilan Ray is a 78 y.o. female w/PMH of HTN, HLD, CHF, chronic pain on opioids, peripheral neuropathy, L-intertroch fx s/p TFNA (12/11/2018, Dr. Eulalio De La Cruz), who presents as transfer from OSH with left-sided hip pain.  Orthopedics was consulted to evaluate for septic arthritis of the left hip.  On arrival to C, patient was afebrile with stable vitals and labs showing WBC 26.97.  Imaging significant for left femoral head collapse with cut-through of the TFNA helical blade, which is now protruding into the acetabulum.  Due to concern for infectious etiology in the setting of leukocytosis and blood culture PCR positive for Staph aureus, we will plan for aspiration of the left hip prior to any surgical intervention.      - Admitted to  for IV abx and medical optimization   - NWB LLE   - Multimodal pain regimen limiting narcotics  - IR consulted for aspiration of L-hip; appreciate assistance    » Further recommendations will be made pending L-hip aspiration results

## 2024-01-18 NOTE — NURSING
Left hip/joint aspiration of 72 cc purulent drainage and specimens to lab complete. Pt tolerated well. VSS. No signs or symptoms of distress noted. Pt will be transferred to MPU bed escorted by RN and report to RN on arrival.

## 2024-01-18 NOTE — PLAN OF CARE
[facility] - Telemetry Stepdown  Initial Discharge Assessment       Primary Care Provider: Alphonse Boothe III, MD    Admission Diagnosis: Septic arthritis [M00.9]    Admission Date: 1/17/2024  Expected Discharge Date:     Transition of Care Barriers: None    Payor: AET[employer] MANAGED MEDICARE / Plan: AETNA MEDICARE PLAN PPO / Product Type: Medicare Advantage /     Extended Emergency Contact Information  Primary Emergency Contact: CoryOtoniel  Address: [address] [place] [address] [id number] [place] [address]  Home Phone: [phone]  Mobile Phone: [phone]  Relation: Spouse  Secondary Emergency Contact: SAHRA MCGUIRE  Mobile Phone: [phone]  Relation: Son    Discharge Plan A: Home with family, Home Health  Discharge Plan B: Home with family      [id number] - [place] - [address] [facility]  [address]  [facility] [address]  Phone: [phone] Fax: [phone]      Initial Assessment (most recent)       Adult Discharge Assessment - 01/18/24 1629          Discharge Assessment    Assessment Type Discharge Planning Assessment     Confirmed/corrected address, phone number and insurance Yes     Confirmed Demographics Correct on Facesheet     Source of Information patient;family     Communicated BAILEY with patient/caregiver Date not available/Unable to determine     Reason For Admission Sepsis     Facility Arrived From: [facility]     Do you expect to return to your current living situation? Yes     Do you have help at home or someone to help you manage your care at home? Yes     Current cognitive status: Alert/Oriented     Walking or Climbing Stairs Difficulty yes     Walking or Climbing Stairs ambulation difficulty, requires equipment     Mobility Management Patient ambulates household distances with a RW and uses a W/C for long distances outside the home     Dressing/Bathing Difficulty yes     Dressing/Bathing bathing difficulty,  requires equipment     Dressing/Bathing Management Shower chair and BSC     Home Accessibility wheelchair accessible     Equipment Currently Used at Home walker, rolling;rollator;wheelchair;bedside commode;cane, straight;other (see comments)   BP machine    Readmission within 30 days? No     Patient currently being followed by outpatient case management? No     Do you currently have service(s) that help you manage your care at home? Yes     Name and Contact number of agency [facility]     Is the pt/caregiver preference to resume services with current agency Yes     Do you take prescription medications? Yes     Do you have prescription coverage? Yes     Do you have any problems affording any of your prescribed medications? No     Is the patient taking medications as prescribed? yes     How do you get to doctors appointments? family or friend will provide     Are you on dialysis? No     Discharge Plan A Home with family;Home Health     Discharge Plan B Home with family     DME Needed Upon Discharge  none     Discharge Plan discussed with: Spouse/sig other;Patient;Adult children     Transition of Care Barriers None        Financial Resource Strain    How hard is it for you to pay for the very basics like food, housing, medical care, and heating? Not hard at all        Housing Stability    In the last 12 months, was there a time when you were not able to pay the mortgage or rent on time? No     In the last 12 months, was there a time when you did not have a steady place to sleep or slept in a shelter (including now)? No        Transportation Needs    In the past 12 months, has lack of transportation kept you from medical appointments or from getting medications? No     In the past 12 months, has lack of transportation kept you from meetings, work, or from getting things needed for daily living? No        Food Insecurity    Within the past 12 months, you worried that your food would run out before you got the  money to buy more. Never true     Within the past 12 months, the food you bought just didn't last and you didn't have money to get more. Never true        Stress    Do you feel stress - tense, restless, nervous, or anxious, or unable to sleep at night because your mind is troubled all the time - these days? Not at all        Social Connections    In a typical week, how many times do you talk on the phone with family, friends, or neighbors? More than three times a week     How often do you get together with friends or relatives? --   Family/friends live out of state    How often do you attend Moravian or Orthodox services? Never     Do you belong to any clubs or organizations such as Moravian groups, unions, fraternal or athletic groups, or school groups? No     How often do you attend meetings of the clubs or organizations you belong to? Never     Are you , , , , never , or living with a partner?         Alcohol Use    Q1: How often do you have a drink containing alcohol? Never     Q2: How many drinks containing alcohol do you have on a typical day when you are drinking? Patient does not drink     Q3: How often do you have six or more drinks on one occasion? Never                 Discharge Plan A and Plan B have been determined by review of patient's clinical status, future medical and therapeutic needs, and coverage/benefits for post-acute care in coordination with multidisciplinary team members.    Indira Mckinley, LCSW [facility]  Ext. [phone]

## 2024-01-18 NOTE — ASSESSMENT & PLAN NOTE
Initially presented to outside hospital with complaints of left hip pain and inability to ambulate, and met sepsis criteria with tachycardia (AFib with RVR) and leukocytosis.  Source is likely left hip septic arthritis.  Blood cultures obtained there, and lactate only mildly elevated which resolved with fluids.  We will continue vancomycin and Zosyn for presumed septic arthritis and consult Orthopedics for further evaluation.

## 2024-01-18 NOTE — NURSING
Patient received s/p left hip joint/abscess aspiration in MPU bay 4. Procedure site dressing to L anterior hip is clean and dry. No evidence of bleeding or hematoma formation. Patient to recover for 1 hour and return to inpatient room. Fall precautions reviewed. Bed in lowest, locked position. Call light within reach.

## 2024-01-18 NOTE — PLAN OF CARE
Patient AAOx3, 10/10 left hip pain noted (MD Aware), respirations even and unlabored, will continue to monitor. VSS. Acceptance of education, consents signed, H/P done. Labs reviewed. Patient in IR Room 173 for left hip aspiration procedure. Warm blankets applied to patient. Patient prepped and draped in sterile fashion.

## 2024-01-18 NOTE — SUBJECTIVE & OBJECTIVE
Past Medical History:   Diagnosis Date    Anemia     Arthritis     Bleeding ulcer 07/2016    Chronic pain     DDD (degenerative disc disease), cervical     DDD (degenerative disc disease), lumbar     Depression     Disc degeneration, lumbosacral     Diverticulitis     Encounter for blood transfusion     Hypertension     IBS (irritable bowel syndrome)     Neuropathy of both feet     Seizures     none  since 2017    Umbilical hernia 2020       Past Surgical History:   Procedure Laterality Date    BACK SURGERY      BREAST BIOPSY      BREAST SURGERY Right     lumpectomy    CAUDAL EPIDURAL STEROID INJECTION N/A 01/28/2022    Procedure: Injection-steroid-epidural-caudal;  Surgeon: Luzmaria Marcelino MD;  Location: [facility];  Service: Pain Management;  Laterality: N/A;    COLONOSCOPY N/A 01/14/2022    Procedure: COLONOSCOPY;  Surgeon: Abby Landers MD;  Location: [facility] ENDO;  Service: Endoscopy;  Laterality: N/A;    ENDOSCOPIC ULTRASOUND OF UPPER GASTROINTESTINAL TRACT N/A 07/21/2020    Procedure: ULTRASOUND, UPPER GI TRACT, ENDOSCOPIC;  Surgeon: Marcio Nguyễn III, MD;  Location: [facility];  Service: Endoscopy;  Laterality: N/A;    ESOPHAGOGASTRODUODENOSCOPY N/A 01/14/2022    Procedure: EGD (ESOPHAGOGASTRODUODENOSCOPY);  Surgeon: Abby Landers MD;  Location: [facility] ENDO;  Service: Endoscopy;  Laterality: N/A;    ESOPHAGOGASTRODUODENOSCOPY N/A 06/10/2022    Procedure: EGD (ESOPHAGOGASTRODUODENOSCOPY);  Surgeon: Abby Landers MD;  Location: [facility] ENDO;  Service: Endoscopy;  Laterality: N/A;    EYE SURGERY      cataract    HYSTERECTOMY      INTRAMEDULLARY RODDING OF TROCHANTER OF FEMUR Left 12/11/2018    Procedure: INSERTION, INTRAMEDULLARY SANTOS, FEMUR, TROCHANTER;  Surgeon: Eulalio De La Cruz MD;  Location: [facility];  Service: Orthopedics;  Laterality: Left;    REPAIR OF EPIGASTRIC HERNIA N/A 12/7/2023    Procedure: REPAIR, HERNIA, EPIGASTRIC;  Surgeon: Vipul Escobar MD;  Location: [facility];  Service:  General;  Laterality: N/A;    SPINAL CORD STIMULATOR IMPLANT  09/18/2013    and removal    SPINE SURGERY  2006    lumbar L2-S1 decompression.    SPINE SURGERY      cervical decompression    TONSILLECTOMY         Review of patient's allergies indicates:  No Known Allergies    Current Facility-Administered Medications   Medication    (Magic mouthwash) 1:1:1 diphenhydrAMINE(Benadryl) 12.5mg/5ml liq, aluminum & magnesium hydroxide-simethicone (Maalox), LIDOcaine viscous 2%    acetaminophen tablet 650 mg    acetaminophen tablet 650 mg    aluminum-magnesium hydroxide-simethicone 200-200-20 mg/5 mL suspension 30 mL    amiodarone tablet 200 mg    amLODIPine tablet 5 mg    apixaban tablet 2.5 mg    docusate sodium capsule 100 mg    EScitalopram oxalate tablet 20 mg    melatonin tablet 6 mg    metoprolol tartrate (LOPRESSOR) tablet 50 mg    mupirocin 2 % ointment    naloxone 0.4 mg/mL injection 0.02 mg    nystatin 100,000 unit/mL suspension 500,000 Units    ondansetron disintegrating tablet 8 mg    oxyCODONE-acetaminophen  mg per tablet 1 tablet    piperacillin-tazobactam (ZOSYN) 4.5 g in dextrose 5 % in water (D5W) 100 mL IVPB (MB+)    polyethylene glycol packet 17 g    pregabalin capsule 200 mg    simethicone chewable tablet 80 mg    sodium chloride 0.9% flush 1-10 mL    vancomycin (VANCOCIN) 1,000 mg in dextrose 5 % (D5W) 250 mL IVPB (Vial-Mate)    vancomycin - pharmacy to dose     Family History       Problem Relation (Age of Onset)    COPD Brother    Coronary artery disease Father    Depression Father    Diabetes Mother, Father, Sister, Brother    Heart disease Father    Hypertension Mother, Father    Irritable bowel syndrome Mother          Tobacco Use    Smoking status: Never    Smokeless tobacco: Never   Substance and Sexual Activity    Alcohol use: No    Drug use: No    Sexual activity: Not Currently     ROS  Unable to obtain due to minimal patient responsiveness    Objective:     Vital Signs (Most  Recent):  Temp: 99.7 °F (37.6 °C) (01/18/24 0045)  Pulse: 76 (01/18/24 0300)  Resp: 18 (01/17/24 2031)  BP: 117/74 (01/18/24 0045)  SpO2: (!) 92 % (01/18/24 0045) Vital Signs (24h Range):  Temp:  [98.1 °F (36.7 °C)-99.7 °F (37.6 °C)] 99.7 °F (37.6 °C)  Pulse:  [] 76  Resp:  [17-27] 18  SpO2:  [92 %-100 %] 92 %  BP: (117-166)/(66-84) 117/74     Weight: 68 kg (149 lb 14.6 oz)     Body mass index is 24.2 kg/m².    No intake or output data in the 24 hours ending 01/18/24 0406     Ortho/SPM Exam  LLE  Skin intact with mild swelling over lateral hip  Pain with Log roll of leg  No bony TTP throughout  Compartments soft  Painless ROM ankle   SILT Sa/Vargas/DP/SP/T  Motor intact TA/SP/DP  2+ DP and PT     RLE:  Skin intact, no deformity  No TTP  Compartments soft  Full painless ROM throughout lower extremity  SILT Sa/Vargas/DP/SP/T  Motor intact TA/SP/DP  2+ DP/PT     BUE:  Skin intact, no deformity noted  No open wounds/abrasions/crepitus  No bony TTP  FROM shoulder, elbow and wrist  SILT M/U/R  Motor intact AIN/PIN/M/U/R   Cap refill < 2s  2+ RP         Significant Labs: All pertinent labs within the past 24 hours have been reviewed.    Significant Imaging: I have reviewed and interpreted all pertinent imaging results/findings.  CT of the pelvis showing cortical collapse of the left femoral head and associated TFNA helical blade cut-through

## 2024-01-18 NOTE — CONSULTS
"[facility] - Telemetry Stepdown  Orthopedics  Consult Note    Patient Name: Froilan Ray  MRN: [id number]  Admission Date: 1/17/2024  Hospital Length of Stay: 1 days  Attending Provider: Jerry Jensen MD  Primary Care Provider: Alphonse Boothe III, MD    Patient information was obtained from ER records.     Inpatient consult to Orthopedics  Consult performed by: OTF Bustos MD  Consult ordered by: Santosh Hoffman MD        Subjective:     Principal Problem:Sepsis    Chief Complaint: No chief complaint on file.       HPI: Froilan Ray is a 78 y.o. female w/PMH of HTN, HLD, CHF, chronic pain on opioids, peripheral neuropathy, L-intertroch fx s/p TFNA (12/11/2018, Dr. Eulalio De La Cruz), who presents as transfer from OSH with left-sided hip pain.  Patient is an unreliable historian,  but she endorses having worsening pain at her left hip for "a few weeks" that hasn't been alleviated by percocets.  Denies recents falls or trauma to the left hip.  Denies any fevers/chills or any other musculoskeletal pains.    Past Medical History:   Diagnosis Date    Anemia     Arthritis     Bleeding ulcer 07/2016    Chronic pain     DDD (degenerative disc disease), cervical     DDD (degenerative disc disease), lumbar     Depression     Disc degeneration, lumbosacral     Diverticulitis     Encounter for blood transfusion     Hypertension     IBS (irritable bowel syndrome)     Neuropathy of both feet     Seizures     none  since 2017    Umbilical hernia 2020       Past Surgical History:   Procedure Laterality Date    BACK SURGERY      BREAST BIOPSY      BREAST SURGERY Right     lumpectomy    CAUDAL EPIDURAL STEROID INJECTION N/A 01/28/2022    Procedure: Injection-steroid-epidural-caudal;  Surgeon: Luzmaria Marcelino MD;  Location: [facility] OR;  Service: Pain Management;  Laterality: N/A;    COLONOSCOPY N/A 01/14/2022    Procedure: COLONOSCOPY;  Surgeon: Abby Landers MD;  Location: [facility];  Service: Endoscopy;  " Laterality: N/A;    ENDOSCOPIC ULTRASOUND OF UPPER GASTROINTESTINAL TRACT N/A 07/21/2020    Procedure: ULTRASOUND, UPPER GI TRACT, ENDOSCOPIC;  Surgeon: Marcio Nguyễn III, MD;  Location: [facility] ENDO;  Service: Endoscopy;  Laterality: N/A;    ESOPHAGOGASTRODUODENOSCOPY N/A 01/14/2022    Procedure: EGD (ESOPHAGOGASTRODUODENOSCOPY);  Surgeon: Abby Landers MD;  Location: [facility] ENDO;  Service: Endoscopy;  Laterality: N/A;    ESOPHAGOGASTRODUODENOSCOPY N/A 06/10/2022    Procedure: EGD (ESOPHAGOGASTRODUODENOSCOPY);  Surgeon: Abby Landers MD;  Location: [facility] ENDO;  Service: Endoscopy;  Laterality: N/A;    EYE SURGERY      cataract    HYSTERECTOMY      INTRAMEDULLARY RODDING OF TROCHANTER OF FEMUR Left 12/11/2018    Procedure: INSERTION, INTRAMEDULLARY SANTOS, FEMUR, TROCHANTER;  Surgeon: Eulalio De La Cruz MD;  Location: [facility] OR;  Service: Orthopedics;  Laterality: Left;    REPAIR OF EPIGASTRIC HERNIA N/A 12/7/2023    Procedure: REPAIR, HERNIA, EPIGASTRIC;  Surgeon: Vipul Escobar MD;  Location: [facility] OR;  Service: General;  Laterality: N/A;    SPINAL CORD STIMULATOR IMPLANT  09/18/2013    and removal    SPINE SURGERY  2006    lumbar L2-S1 decompression.    SPINE SURGERY      cervical decompression    TONSILLECTOMY         Review of patient's allergies indicates:  No Known Allergies    Current Facility-Administered Medications   Medication    (Magic mouthwash) 1:1:1 diphenhydrAMINE(Benadryl) 12.5mg/5ml liq, aluminum & magnesium hydroxide-simethicone (Maalox), LIDOcaine viscous 2%    acetaminophen tablet 650 mg    acetaminophen tablet 650 mg    aluminum-magnesium hydroxide-simethicone 200-200-20 mg/5 mL suspension 30 mL    amiodarone tablet 200 mg    amLODIPine tablet 5 mg    apixaban tablet 2.5 mg    docusate sodium capsule 100 mg    EScitalopram oxalate tablet 20 mg    melatonin tablet 6 mg    metoprolol tartrate (LOPRESSOR) tablet 50 mg    mupirocin 2 % ointment    naloxone 0.4 mg/mL injection 0.02 mg     nystatin 100,000 unit/mL suspension 500,000 Units    ondansetron disintegrating tablet 8 mg    oxyCODONE-acetaminophen  mg per tablet 1 tablet    piperacillin-tazobactam (ZOSYN) 4.5 g in dextrose 5 % in water (D5W) 100 mL IVPB (MB+)    polyethylene glycol packet 17 g    pregabalin capsule 200 mg    simethicone chewable tablet 80 mg    sodium chloride 0.9% flush 1-10 mL    vancomycin (VANCOCIN) 1,000 mg in dextrose 5 % (D5W) 250 mL IVPB (Vial-Mate)    vancomycin - pharmacy to dose     Family History       Problem Relation (Age of Onset)    COPD Brother    Coronary artery disease Father    Depression Father    Diabetes Mother, Father, Sister, Brother    Heart disease Father    Hypertension Mother, Father    Irritable bowel syndrome Mother          Tobacco Use    Smoking status: Never    Smokeless tobacco: Never   Substance and Sexual Activity    Alcohol use: No    Drug use: No    Sexual activity: Not Currently     ROS  Unable to obtain due to minimal patient responsiveness    Objective:     Vital Signs (Most Recent):  Temp: 99.7 °F (37.6 °C) (01/18/24 0045)  Pulse: 76 (01/18/24 0300)  Resp: 18 (01/17/24 2031)  BP: 117/74 (01/18/24 0045)  SpO2: (!) 92 % (01/18/24 0045) Vital Signs (24h Range):  Temp:  [98.1 °F (36.7 °C)-99.7 °F (37.6 °C)] 99.7 °F (37.6 °C)  Pulse:  [] 76  Resp:  [17-27] 18  SpO2:  [92 %-100 %] 92 %  BP: (117-166)/(66-84) 117/74     Weight: 68 kg (149 lb 14.6 oz)     Body mass index is 24.2 kg/m².    No intake or output data in the 24 hours ending 01/18/24 0406     Ortho/SPM Exam  LLE  Skin intact with mild swelling over lateral hip  Pain with Log roll of leg  No bony TTP throughout  Compartments soft  Painless ROM ankle   SILT Sa/Vargas/DP/SP/T  Motor intact TA/SP/DP  2+ DP and PT     RLE:  Skin intact, no deformity  No TTP  Compartments soft  Full painless ROM throughout lower extremity  SILT Sa/Vargas/DP/SP/T  Motor intact TA/SP/DP  2+ DP/PT     BUE:  Skin intact, no deformity noted  No open  wounds/abrasions/crepitus  No bony TTP  FROM shoulder, elbow and wrist  SILT M/U/R  Motor intact AIN/PIN/M/U/R   Cap refill < 2s  2+ RP         Significant Labs: All pertinent labs within the past 24 hours have been reviewed.    Significant Imaging: I have reviewed and interpreted all pertinent imaging results/findings.  CT of the pelvis showing cortical collapse of the left femoral head and associated TFNA helical blade cut-through  Assessment/Plan:     Painful orthopaedic hardware  Froilan Ray is a 78 y.o. female w/PMH of HTN, HLD, CHF, chronic pain on opioids, peripheral neuropathy, L-intertroch fx s/p TFNA (12/11/2018, Dr. Eulalio De La Cruz), who presents as transfer from OSH with left-sided hip pain.  Orthopedics was consulted to evaluate for septic arthritis of the left hip.  On arrival to [facility], patient was afebrile with stable vitals and labs showing WBC 26.97.  Imaging significant for left femoral head collapse with cut-through of the TFNA helical blade, which is now protruding into the acetabulum.  Due to concern for infectious etiology in the setting of leukocytosis and blood culture PCR positive for Staph aureus, we will plan for aspiration of the left hip prior to any surgical intervention.      - Admitted to  for IV abx and medical optimization   - NWB LLE   - Multimodal pain regimen limiting narcotics  - Abx: per primary; currently receiving vanc/zosyn with Blood Cx rapid PCR positive for Staph A.   - IR consulted for aspiration of L-hip; appreciate assistance    » Further recommendations will be made pending L-hip aspiration results          OTF Bustos MD  Orthopedics  [facility] - Telemetry Stepdown

## 2024-01-18 NOTE — ASSESSMENT & PLAN NOTE
Patient has a history of atrial fibrillation and initially went into RVR at outside hospital.  Converted back to sinus rhythm with CCB and amiodarone infusions.  Cardiology was consulted and recommended continuing home amiodarone, metoprolol, and resuming home Eliquis (as patient was noncompliant).  We will monitor on telemetry and check repeat TTE.

## 2024-01-18 NOTE — PROGRESS NOTES
"Pharmacokinetic Initial Assessment: IV Vancomycin    Assessment/Plan:    Initiate intravenous vancomycin with loading dose of 1750 mg once on 1/16 followed by a maintenance dose of vancomycin 1000 mg IV every 12 hours  Desired empiric serum trough concentration is 15 to 20 mcg/mL  Draw vancomycin trough level 60 min prior to fourth dose on 1/18 at approximately 2000  Pharmacy will continue to follow and monitor vancomycin.      Please contact pharmacy at extension [phone] with any questions regarding this assessment.     Thank you for the consult,   Carmelina Pride, PharmD       Patient brief summary:  Froilan Ray is a 78 y.o. female initiated on antimicrobial therapy with IV Vancomycin for treatment of suspected bone/joint infection    Drug Allergies:   Review of patient's allergies indicates:  No Known Allergies    Actual Body Weight:   68 kg    Renal Function:   Estimated Creatinine Clearance: 74.7 mL/min (based on SCr of 0.6 mg/dL).,     Dialysis Method (if applicable):  N/A    CBC (last 72 hours):  Recent Labs   Lab Result Units 01/16/24  1437 01/17/24  1754   WBC K/uL 28.57* 25.34*   Hemoglobin g/dL 9.4* 7.6*   Hematocrit % 29.8* 23.9*   Platelets K/uL 698* 635*   Gran % % 88.8* 89.0*   Lymph % % 2.8* 3.9*   Mono % % 4.7 5.2   Eosinophil % % 0.0 0.0   Basophil % % 0.3 0.2   Differential Method  Automated Automated       Metabolic Panel (last 72 hours):  Recent Labs   Lab Result Units 01/16/24  1437 01/16/24  1519   Sodium mmol/L 128*  --    Potassium mmol/L 3.7  --    Chloride mmol/L 92*  --    CO2 mmol/L 23  --    Glucose mg/dL 104  --    Glucose, UA   --  Negative   BUN mg/dL 15  --    Creatinine mg/dL 0.6  --    Albumin g/dL 3.0*  --    Total Bilirubin mg/dL 1.2*  --    Alkaline Phosphatase U/L 243*  --    AST U/L 260*  --    ALT U/L 158*  --        Drug levels (last 3 results):  No results for input(s): "VANCOMYCINRA", "VANCORANDOM", "VANCOMYCINPE", "VANCOPEAK", "VANCOMYCINTR", "VANCOTROUGH" in the " last 72 hours.    Microbiologic Results:  Microbiology Results (last 7 days)       ** No results found for the last 168 hours. **

## 2024-01-18 NOTE — H&P
Please see IR consult note dated 1/18/2024    Shiloh Parson PA-C  Interventional Radiology  Spectra [phone]  1/18/2024

## 2024-01-18 NOTE — NURSING
Procedure recovery complete. VSS. Procedure site dressing to left hip is clean, dry, and intact. Report called to Karly ROMERO.

## 2024-01-18 NOTE — HPI
"Froilan Ray is a 78 y.o. female w/PMH of HTN, HLD, CHF, chronic pain on opioids, peripheral neuropathy, L-intertroch fx s/p TFNA (12/11/2018, Dr. Eulalio De La Cruz), who presents as transfer from OSH with left-sided hip pain.  Patient is an unreliable historian,  but she endorses having worsening pain at her left hip for "a few weeks" that hasn't been alleviated by percocets.  Denies recents falls or trauma to the left hip.  Denies any fevers/chills or any other musculoskeletal pains.  "

## 2024-01-18 NOTE — SUBJECTIVE & OBJECTIVE
Past Medical History:   Diagnosis Date    Anemia     Arthritis     Bleeding ulcer 07/2016    Chronic pain     DDD (degenerative disc disease), cervical     DDD (degenerative disc disease), lumbar     Depression     Disc degeneration, lumbosacral     Diverticulitis     Encounter for blood transfusion     Hypertension     IBS (irritable bowel syndrome)     Neuropathy of both feet     Seizures     none  since 2017    Umbilical hernia 2020       Past Surgical History:   Procedure Laterality Date    BACK SURGERY      BREAST BIOPSY      BREAST SURGERY Right     lumpectomy    CAUDAL EPIDURAL STEROID INJECTION N/A 01/28/2022    Procedure: Injection-steroid-epidural-caudal;  Surgeon: Luzmaria Marcelino MD;  Location: [facility];  Service: Pain Management;  Laterality: N/A;    COLONOSCOPY N/A 01/14/2022    Procedure: COLONOSCOPY;  Surgeon: Abby Landers MD;  Location: [facility] ENDO;  Service: Endoscopy;  Laterality: N/A;    ENDOSCOPIC ULTRASOUND OF UPPER GASTROINTESTINAL TRACT N/A 07/21/2020    Procedure: ULTRASOUND, UPPER GI TRACT, ENDOSCOPIC;  Surgeon: Marcio Nguyễn III, MD;  Location: [facility];  Service: Endoscopy;  Laterality: N/A;    ESOPHAGOGASTRODUODENOSCOPY N/A 01/14/2022    Procedure: EGD (ESOPHAGOGASTRODUODENOSCOPY);  Surgeon: Abby Landers MD;  Location: [facility] ENDO;  Service: Endoscopy;  Laterality: N/A;    ESOPHAGOGASTRODUODENOSCOPY N/A 06/10/2022    Procedure: EGD (ESOPHAGOGASTRODUODENOSCOPY);  Surgeon: Abby Landers MD;  Location: [facility] ENDO;  Service: Endoscopy;  Laterality: N/A;    EYE SURGERY      cataract    HYSTERECTOMY      INTRAMEDULLARY RODDING OF TROCHANTER OF FEMUR Left 12/11/2018    Procedure: INSERTION, INTRAMEDULLARY SANTOS, FEMUR, TROCHANTER;  Surgeon: Eulalio De La Cruz MD;  Location: [facility];  Service: Orthopedics;  Laterality: Left;    REPAIR OF EPIGASTRIC HERNIA N/A 12/7/2023    Procedure: REPAIR, HERNIA, EPIGASTRIC;  Surgeon: Vipul Escobar MD;  Location: [facility];  Service:  General;  Laterality: N/A;    SPINAL CORD STIMULATOR IMPLANT  2013    and removal    SPINE SURGERY  2006    lumbar L2-S1 decompression.    SPINE SURGERY      cervical decompression    TONSILLECTOMY         Review of patient's allergies indicates:  No Known Allergies    Current Facility-Administered Medications on File Prior to Encounter   Medication    [] amiodarone 360 mg/200 mL (1.8 mg/mL) infusion    [COMPLETED] HYDROmorphone injection 0.5 mg    [COMPLETED] morphine injection 4 mg    [COMPLETED] ondansetron injection 4 mg    [COMPLETED] vancomycin (VANCOCIN) 1,750 mg in dextrose 5 % (D5W) 500 mL IVPB    [DISCONTINUED] (Magic mouthwash) 1:1:1 diphenhydrAMINE(Benadryl) 12.5mg/5ml liq, aluminum & magnesium hydroxide-simethicone (Maalox), LIDOcaine viscous 2%    [DISCONTINUED] acetaminophen tablet 650 mg    [DISCONTINUED] amiodarone 360 mg/200 mL (1.8 mg/mL) infusion    [DISCONTINUED] diltiaZEM 100 mg in dextrose 5% 100 mL IVPB (ready to mix) (titrating)    [DISCONTINUED] HYDROmorphone injection 0.5 mg    [DISCONTINUED] melatonin tablet 6 mg    [DISCONTINUED] nystatin 100,000 unit/mL suspension 500,000 Units    [DISCONTINUED] nystatin 100,000 unit/mL suspension 500,000 Units    [DISCONTINUED] ondansetron injection 4 mg    [DISCONTINUED] piperacillin-tazobactam 4.5 g in dextrose 5 % 100 mL IVPB (ready to mix)    [DISCONTINUED] sodium chloride 0.9% flush 10 mL    [DISCONTINUED] vancomycin - pharmacy to dose    [DISCONTINUED] vancomycin - pharmacy to dose    [DISCONTINUED] vancomycin 750 mg in dextrose 5 % 250 mL IVPB (ready to mix)    [DISCONTINUED] vancomycin 750 mg in dextrose 5 % 250 mL IVPB (ready to mix)     Current Outpatient Medications on File Prior to Encounter   Medication Sig    acetaminophen (TYLENOL) 325 MG tablet Take 650 mg by mouth every 8 (eight) hours as needed for Pain.    amiodarone (PACERONE) 200 MG Tab Take 1 tablet (200 mg total) by mouth once daily. (Patient not taking: Reported on  12/6/2023)    amlodipine-benazepril 5-20 mg (LOTREL) 5-20 mg per capsule Take 1 capsule by mouth once daily.    apixaban (ELIQUIS) 2.5 mg Tab Take 1 tablet (2.5 mg total) by mouth 2 (two) times daily.    cyclobenzaprine (FLEXERIL) 10 MG tablet TAKE 1 TABLET(10 MG) BY MOUTH THREE TIMES DAILY AS NEEDED FOR MUSCLE SPASMS (Patient taking differently: Take 10 mg by mouth 3 (three) times daily as needed for Muscle spasms.)    docusate sodium (COLACE) 100 MG capsule Take 1 capsule (100 mg total) by mouth 2 (two) times daily.    EScitalopram oxalate (LEXAPRO) 20 MG tablet TAKE 1 TABLET(20 MG) BY MOUTH EVERY DAY (Patient taking differently: Take 20 mg by mouth every evening.)    fluconazole (DIFLUCAN) 100 MG tablet Take 1 tablet (100 mg total) by mouth once daily. for 10 days    metoprolol tartrate (LOPRESSOR) 50 MG tablet Take 1 tablet (50 mg total) by mouth 3 (three) times daily. (Patient not taking: Reported on 12/6/2023)    omeprazole (PRILOSEC) 40 MG capsule TAKE 1 CAPSULE(40 MG) BY MOUTH EVERY MORNING (Patient taking differently: Take 40 mg by mouth every morning.)    oxyCODONE-acetaminophen (PERCOCET)  mg per tablet Take 1 tablet by mouth every 4 to 6 hours as needed for Pain.    pregabalin (LYRICA) 200 MG Cap TAKE 1 CAPSULE(200 MG) BY MOUTH THREE TIMES DAILY (Patient taking differently: Take 200 mg by mouth 3 (three) times daily. TAKE 1 CAPSULE(200 MG) BY MOUTH THREE TIMES DAILY)    sulfamethoxazole-trimethoprim 800-160mg (BACTRIM DS) 800-160 mg Tab Take 1 tablet by mouth 2 (two) times daily. for 14 days (Patient not taking: Reported on 1/16/2024)    traMADoL (ULTRAM) 50 mg tablet Take 2 tablets by mouth every 12 (twelve) hours as needed for Pain.    [DISCONTINUED] pantoprazole (PROTONIX) 40 MG tablet Take 1 tablet (40 mg total) by mouth 2 (two) times daily.     Family History       Problem Relation (Age of Onset)    COPD Brother    Coronary artery disease Father    Depression Father    Diabetes Mother, Father,  Sister, Brother    Heart disease Father    Hypertension Mother, Father    Irritable bowel syndrome Mother          Tobacco Use    Smoking status: Never    Smokeless tobacco: Never   Substance and Sexual Activity    Alcohol use: No    Drug use: No    Sexual activity: Not Currently     Review of Systems   All other systems reviewed and are negative.    Objective:     Vital Signs (Most Recent):  Temp: 98.1 °F (36.7 °C) (01/17/24 2031)  Pulse: 104 (01/17/24 2031)  Resp: 18 (01/17/24 2031)  BP: (!) 146/67 (01/17/24 2031)  SpO2: 98 % (01/17/24 2031) Vital Signs (24h Range):  Temp:  [98.1 °F (36.7 °C)-98.3 °F (36.8 °C)] 98.1 °F (36.7 °C)  Pulse:  [] 104  Resp:  [] 18  SpO2:  [95 %-100 %] 98 %  BP: (101-166)/(66-84) 146/67     Weight: 68 kg (149 lb 14.6 oz)  Body mass index is 24.2 kg/m².     Physical Exam  Vitals and nursing note reviewed.   Constitutional:       General: She is not in acute distress.     Appearance: She is well-developed. She is not diaphoretic.      Comments: Appears uncomfortable   HENT:      Head: Normocephalic and atraumatic.      Mouth/Throat:      Mouth: Mucous membranes are dry.   Eyes:      General: No scleral icterus.     Conjunctiva/sclera: Conjunctivae normal.   Neck:      Vascular: No JVD.   Cardiovascular:      Rate and Rhythm: Normal rate and regular rhythm.   Pulmonary:      Effort: Pulmonary effort is normal. No respiratory distress.   Abdominal:      General: There is no distension.      Tenderness: There is no abdominal tenderness.   Musculoskeletal:      Right lower leg: No edema.      Left lower leg: No edema.      Comments: ROM exam deferred due to reported pain   Skin:     Coloration: Skin is not jaundiced or pale.   Neurological:      Mental Status: She is alert and oriented to person, place, and time.      Motor: No abnormal muscle tone.   Psychiatric:         Mood and Affect: Mood normal.         Behavior: Behavior normal.                Significant Labs: All pertinent  labs within the past 24 hours have been reviewed.  CBC:   Recent Labs   Lab 01/16/24  1437 01/17/24  1754 01/17/24  2119   WBC 28.57* 25.34* 26.97*   HGB 9.4* 7.6* 8.4*   HCT 29.8* 23.9* 26.9*   * 635* 756*     CMP:   Recent Labs   Lab 01/16/24  1437   *   K 3.7   CL 92*   CO2 23      BUN 15   CREATININE 0.6   CALCIUM 9.0   PROT 7.5   ALBUMIN 3.0*   BILITOT 1.2*   ALKPHOS 243*   *   *   ANIONGAP 13       Significant Imaging: I have reviewed all pertinent imaging results/findings within the past 24 hours.

## 2024-01-18 NOTE — PLAN OF CARE
Problem: Adult Inpatient Plan of Care  Goal: Plan of Care Review  Outcome: Ongoing, Progressing  Goal: Patient-Specific Goal (Individualized)  Outcome: Ongoing, Progressing  Goal: Absence of Hospital-Acquired Illness or Injury  Outcome: Ongoing, Progressing  Goal: Optimal Comfort and Wellbeing  Outcome: Ongoing, Progressing     Problem: Adjustment to Illness (Sepsis/Septic Shock)  Goal: Optimal Coping  Outcome: Ongoing, Progressing     Problem: Bleeding (Sepsis/Septic Shock)  Goal: Absence of Bleeding  Outcome: Ongoing, Progressing     Pt lying in bed resting. Resp even and unlabored. VSS. No acute distress noted. Bed in lowest position and call light in reach.

## 2024-01-18 NOTE — NURSING
Nurses Note -- 4 Eyes      1/18/2024   0710 AM      Skin assessed during: Q Shift Change      [] No Altered Skin Integrity Present    []Prevention Measures Documented      [x] Yes- Altered Skin Integrity Present or Discovered   [] LDA Added if Not in Epic (Describe Wound)   [] New Altered Skin Integrity was Present on Admit and Documented in LDA   [] Wound Image Taken    Wound Care Consulted? No    Attending Nurse:  Karly Castillo RN/Staff Member:   Veronica ROMERO

## 2024-01-18 NOTE — H&P
"[facility] - Telemetry [facility] Medicine  History & Physical    Patient Name: Froilan Ray  MRN: [id number]  Patient Class: IP- Inpatient  Admission Date: 1/17/2024  Attending Physician: Jerry Jensen MD   Primary Care Provider: Alphonse Boothe III, MD         Patient information was obtained from patient, past medical records, and ER records.     Subjective:     Principal Problem:Sepsis    Chief Complaint: No chief complaint on file.       HPI: Froilan Ray is a 78 y.o. female with history of L hip surgery, HTN, HLD, CHF, chronic pain on opioids, peripheral neuropathy who was transferred from [facility] for concerns for septic joint.     Patient is a poor historian regarding timing and characterization of her symptoms. She reports that for "a long time" she has been "unable to do anything," basically lying in bed most of the day as she is unable to bear weight on her L leg due to hip pain. She reports chronic hip pain for which she takes "2 Percocets every 6 hours," however this has not relieved her pain for at least the past 2-3 weeks. She denies any fever, n/v, diarrhea, or other systemic symptoms except dry mouth.     At the OSH, she met sepsis criteria with leukocytosis and tachycardia. CT showed fluid around her L hip previous surgical site, concerning for septic arthritis. Was in AFib with RVR, and converted to sinus rhythm with cardizem and amiodarone infusions. She was given antibiotics and transferred here for Ortho evaluation.     Past Medical History:   Diagnosis Date    Anemia     Arthritis     Bleeding ulcer 07/2016    Chronic pain     DDD (degenerative disc disease), cervical     DDD (degenerative disc disease), lumbar     Depression     Disc degeneration, lumbosacral     Diverticulitis     Encounter for blood transfusion     Hypertension     IBS (irritable bowel syndrome)     Neuropathy of both feet     Seizures     none  since 2017    Umbilical hernia 2020       Past Surgical " History:   Procedure Laterality Date    BACK SURGERY      BREAST BIOPSY      BREAST SURGERY Right     lumpectomy    CAUDAL EPIDURAL STEROID INJECTION N/A 2022    Procedure: Injection-steroid-epidural-caudal;  Surgeon: Luzmaria Marcelino MD;  Location: [facility] OR;  Service: Pain Management;  Laterality: N/A;    COLONOSCOPY N/A 2022    Procedure: COLONOSCOPY;  Surgeon: Abby Landers MD;  Location: [facility] ENDO;  Service: Endoscopy;  Laterality: N/A;    ENDOSCOPIC ULTRASOUND OF UPPER GASTROINTESTINAL TRACT N/A 2020    Procedure: ULTRASOUND, UPPER GI TRACT, ENDOSCOPIC;  Surgeon: Marcio Nguyễn III, MD;  Location: [facility];  Service: Endoscopy;  Laterality: N/A;    ESOPHAGOGASTRODUODENOSCOPY N/A 2022    Procedure: EGD (ESOPHAGOGASTRODUODENOSCOPY);  Surgeon: Abby Landers MD;  Location: [facility] ENDO;  Service: Endoscopy;  Laterality: N/A;    ESOPHAGOGASTRODUODENOSCOPY N/A 06/10/2022    Procedure: EGD (ESOPHAGOGASTRODUODENOSCOPY);  Surgeon: Abby Landers MD;  Location: [facility] ENDO;  Service: Endoscopy;  Laterality: N/A;    EYE SURGERY      cataract    HYSTERECTOMY      INTRAMEDULLARY RODDING OF TROCHANTER OF FEMUR Left 2018    Procedure: INSERTION, INTRAMEDULLARY SANTOS, FEMUR, TROCHANTER;  Surgeon: Eulalio De La Cruz MD;  Location: [facility] OR;  Service: Orthopedics;  Laterality: Left;    REPAIR OF EPIGASTRIC HERNIA N/A 2023    Procedure: REPAIR, HERNIA, EPIGASTRIC;  Surgeon: Vipul Escobar MD;  Location: [facility] OR;  Service: General;  Laterality: N/A;    SPINAL CORD STIMULATOR IMPLANT  2013    and removal    SPINE SURGERY  2006    lumbar L2-S1 decompression.    SPINE SURGERY      cervical decompression    TONSILLECTOMY         Review of patient's allergies indicates:  No Known Allergies    Current Facility-Administered Medications on File Prior to Encounter   Medication    [] amiodarone 360 mg/200 mL (1.8 mg/mL) infusion    [COMPLETED] HYDROmorphone injection 0.5 mg     [COMPLETED] morphine injection 4 mg    [COMPLETED] ondansetron injection 4 mg    [COMPLETED] vancomycin (VANCOCIN) 1,750 mg in dextrose 5 % (D5W) 500 mL IVPB    [DISCONTINUED] (Magic mouthwash) 1:1:1 diphenhydrAMINE(Benadryl) 12.5mg/5ml liq, aluminum & magnesium hydroxide-simethicone (Maalox), LIDOcaine viscous 2%    [DISCONTINUED] acetaminophen tablet 650 mg    [DISCONTINUED] amiodarone 360 mg/200 mL (1.8 mg/mL) infusion    [DISCONTINUED] diltiaZEM 100 mg in dextrose 5% 100 mL IVPB (ready to mix) (titrating)    [DISCONTINUED] HYDROmorphone injection 0.5 mg    [DISCONTINUED] melatonin tablet 6 mg    [DISCONTINUED] nystatin 100,000 unit/mL suspension 500,000 Units    [DISCONTINUED] nystatin 100,000 unit/mL suspension 500,000 Units    [DISCONTINUED] ondansetron injection 4 mg    [DISCONTINUED] piperacillin-tazobactam 4.5 g in dextrose 5 % 100 mL IVPB (ready to mix)    [DISCONTINUED] sodium chloride 0.9% flush 10 mL    [DISCONTINUED] vancomycin - pharmacy to dose    [DISCONTINUED] vancomycin - pharmacy to dose    [DISCONTINUED] vancomycin 750 mg in dextrose 5 % 250 mL IVPB (ready to mix)    [DISCONTINUED] vancomycin 750 mg in dextrose 5 % 250 mL IVPB (ready to mix)     Current Outpatient Medications on File Prior to Encounter   Medication Sig    acetaminophen (TYLENOL) 325 MG tablet Take 650 mg by mouth every 8 (eight) hours as needed for Pain.    amiodarone (PACERONE) 200 MG Tab Take 1 tablet (200 mg total) by mouth once daily. (Patient not taking: Reported on 12/6/2023)    amlodipine-benazepril 5-20 mg (LOTREL) 5-20 mg per capsule Take 1 capsule by mouth once daily.    apixaban (ELIQUIS) 2.5 mg Tab Take 1 tablet (2.5 mg total) by mouth 2 (two) times daily.    cyclobenzaprine (FLEXERIL) 10 MG tablet TAKE 1 TABLET(10 MG) BY MOUTH THREE TIMES DAILY AS NEEDED FOR MUSCLE SPASMS (Patient taking differently: Take 10 mg by mouth 3 (three) times daily as needed for Muscle spasms.)    docusate sodium (COLACE) 100 MG capsule  Take 1 capsule (100 mg total) by mouth 2 (two) times daily.    EScitalopram oxalate (LEXAPRO) 20 MG tablet TAKE 1 TABLET(20 MG) BY MOUTH EVERY DAY (Patient taking differently: Take 20 mg by mouth every evening.)    fluconazole (DIFLUCAN) 100 MG tablet Take 1 tablet (100 mg total) by mouth once daily. for 10 days    metoprolol tartrate (LOPRESSOR) 50 MG tablet Take 1 tablet (50 mg total) by mouth 3 (three) times daily. (Patient not taking: Reported on 12/6/2023)    omeprazole (PRILOSEC) 40 MG capsule TAKE 1 CAPSULE(40 MG) BY MOUTH EVERY MORNING (Patient taking differently: Take 40 mg by mouth every morning.)    oxyCODONE-acetaminophen (PERCOCET)  mg per tablet Take 1 tablet by mouth every 4 to 6 hours as needed for Pain.    pregabalin (LYRICA) 200 MG Cap TAKE 1 CAPSULE(200 MG) BY MOUTH THREE TIMES DAILY (Patient taking differently: Take 200 mg by mouth 3 (three) times daily. TAKE 1 CAPSULE(200 MG) BY MOUTH THREE TIMES DAILY)    sulfamethoxazole-trimethoprim 800-160mg (BACTRIM DS) 800-160 mg Tab Take 1 tablet by mouth 2 (two) times daily. for 14 days (Patient not taking: Reported on 1/16/2024)    traMADoL (ULTRAM) 50 mg tablet Take 2 tablets by mouth every 12 (twelve) hours as needed for Pain.    [DISCONTINUED] pantoprazole (PROTONIX) 40 MG tablet Take 1 tablet (40 mg total) by mouth 2 (two) times daily.     Family History       Problem Relation (Age of Onset)    COPD Brother    Coronary artery disease Father    Depression Father    Diabetes Mother, Father, Sister, Brother    Heart disease Father    Hypertension Mother, Father    Irritable bowel syndrome Mother          Tobacco Use    Smoking status: Never    Smokeless tobacco: Never   Substance and Sexual Activity    Alcohol use: No    Drug use: No    Sexual activity: Not Currently     Review of Systems   All other systems reviewed and are negative.    Objective:     Vital Signs (Most Recent):  Temp: 98.1 °F (36.7 °C) (01/17/24 2031)  Pulse: 104 (01/17/24  2031)  Resp: 18 (01/17/24 2031)  BP: (!) 146/67 (01/17/24 2031)  SpO2: 98 % (01/17/24 2031) Vital Signs (24h Range):  Temp:  [98.1 °F (36.7 °C)-98.3 °F (36.8 °C)] 98.1 °F (36.7 °C)  Pulse:  [] 104  Resp:  [] 18  SpO2:  [95 %-100 %] 98 %  BP: (101-166)/(66-84) 146/67     Weight: 68 kg (149 lb 14.6 oz)  Body mass index is 24.2 kg/m².     Physical Exam  Vitals and nursing note reviewed.   Constitutional:       General: She is not in acute distress.     Appearance: She is well-developed. She is not diaphoretic.      Comments: Appears uncomfortable   HENT:      Head: Normocephalic and atraumatic.      Mouth/Throat:      Mouth: Mucous membranes are dry.   Eyes:      General: No scleral icterus.     Conjunctiva/sclera: Conjunctivae normal.   Neck:      Vascular: No JVD.   Cardiovascular:      Rate and Rhythm: Normal rate and regular rhythm.   Pulmonary:      Effort: Pulmonary effort is normal. No respiratory distress.   Abdominal:      General: There is no distension.      Tenderness: There is no abdominal tenderness.   Musculoskeletal:      Right lower leg: No edema.      Left lower leg: No edema.      Comments: ROM exam deferred due to reported pain   Skin:     Coloration: Skin is not jaundiced or pale.   Neurological:      Mental Status: She is alert and oriented to person, place, and time.      Motor: No abnormal muscle tone.   Psychiatric:         Mood and Affect: Mood normal.         Behavior: Behavior normal.                Significant Labs: All pertinent labs within the past 24 hours have been reviewed.  CBC:   Recent Labs   Lab 01/16/24  1437 01/17/24  1754 01/17/24  2119   WBC 28.57* 25.34* 26.97*   HGB 9.4* 7.6* 8.4*   HCT 29.8* 23.9* 26.9*   * 635* 756*     CMP:   Recent Labs   Lab 01/16/24  1437   *   K 3.7   CL 92*   CO2 23      BUN 15   CREATININE 0.6   CALCIUM 9.0   PROT 7.5   ALBUMIN 3.0*   BILITOT 1.2*   ALKPHOS 243*   *   *   ANIONGAP 13       Significant  Imaging: I have reviewed all pertinent imaging results/findings within the past 24 hours.  Assessment/Plan:     * Sepsis  Initially presented to outside hospital with complaints of left hip pain and inability to ambulate, and met sepsis criteria with tachycardia (AFib with RVR) and leukocytosis.  Source is likely left hip septic arthritis.  Blood cultures obtained there, and lactate only mildly elevated which resolved with fluids.  We will continue vancomycin and Zosyn for presumed septic arthritis and consult Orthopedics for further evaluation.    Septic arthritis of hip  Continue vancomycin and Zosyn for now.  Appreciate Orthopedics recommendations.      (HFpEF) heart failure with preserved ejection fraction  Monitor volume status. Control HTN. Check TTE.       Depression  Continue home Lexapro.        Atrial fibrillation with RVR  Patient has a history of atrial fibrillation and initially went into RVR at outside hospital.  Converted back to sinus rhythm with CCB and amiodarone infusions.  Cardiology was consulted and recommended continuing home amiodarone, metoprolol, and resuming home Eliquis (as patient was noncompliant).  We will monitor on telemetry and check repeat TTE.    Iron deficiency anemia  Chronic, and stable around baseline. Monitor.     Peripheral neuropathy  Continue home Lyrica.    Essential hypertension  Continue hospital formulary of home antihypertensives.     Chronic pain with uncomplicated opioid dependence  PDMP reviewed. Will continue pain control for septic arthritis.         VTE Risk Mitigation (From admission, onward)           Ordered     apixaban tablet 2.5 mg  2 times daily         01/17/24 2037     Reason for No Pharmacological VTE Prophylaxis  Once        Question:  Reasons:  Answer:  Already adequately anticoagulated on oral Anticoagulants    01/17/24 2037     IP VTE HIGH RISK PATIENT  Once         01/17/24 2037     Place sequential compression device  Until discontinued          01/17/24 2037                   Advance Care Planning   Patient is Full Code on discussion with her.  Patient's surrogate decision maker is her  and son.             Pharmacokinetic Initial Assessment: IV Vancomycin    Assessment/Plan:    Initiate intravenous vancomycin with loading dose of 1750 mg once on 1/16 followed by a maintenance dose of vancomycin 1000 mg IV every 12 hours  Desired empiric serum trough concentration is 15 to 20 mcg/mL  Draw vancomycin trough level 60 min prior to fourth dose on 1/18 at approximately 2000  Pharmacy will continue to follow and monitor vancomycin.      Please contact pharmacy at extension [phone] with any questions regarding this assessment.     Thank you for the consult,   Carmelina Pride, PharmD       Patient brief summary:  Froilan Ray is a 78 y.o. female initiated on antimicrobial therapy with IV Vancomycin for treatment of suspected bone/joint infection    Drug Allergies:   Review of patient's allergies indicates:  No Known Allergies    Actual Body Weight:   68 kg    Renal Function:   Estimated Creatinine Clearance: 74.7 mL/min (based on SCr of 0.6 mg/dL).,     Dialysis Method (if applicable):  N/A    CBC (last 72 hours):  Recent Labs   Lab Result Units 01/16/24  1437 01/17/24  1754   WBC K/uL 28.57* 25.34*   Hemoglobin g/dL 9.4* 7.6*   Hematocrit % 29.8* 23.9*   Platelets K/uL 698* 635*   Gran % % 88.8* 89.0*   Lymph % % 2.8* 3.9*   Mono % % 4.7 5.2   Eosinophil % % 0.0 0.0   Basophil % % 0.3 0.2   Differential Method  Automated Automated       Metabolic Panel (last 72 hours):  Recent Labs   Lab Result Units 01/16/24  1437 01/16/24  1519   Sodium mmol/L 128*  --    Potassium mmol/L 3.7  --    Chloride mmol/L 92*  --    CO2 mmol/L 23  --    Glucose mg/dL 104  --    Glucose, UA   --  Negative   BUN mg/dL 15  --    Creatinine mg/dL 0.6  --    Albumin g/dL 3.0*  --    Total Bilirubin mg/dL 1.2*  --    Alkaline Phosphatase U/L 243*  --    AST U/L 260*  --    ALT U/L  "158*  --        Drug levels (last 3 results):  No results for input(s): "VANCOMYCINRA", "VANCORANDOM", "VANCOMYCINPE", "VANCOPEAK", "VANCOMYCINTR", "VANCOTROUGH" in the last 72 hours.    Microbiologic Results:  Microbiology Results (last 7 days)       ** No results found for the last 168 hours. **              Santosh Hoffman MD  Department of Hospital Medicine  [facility] - Telemetry Stepdown          "

## 2024-01-18 NOTE — HPI
"Froilan Ray is a 78 year old white woman with hypertension, chronic systolic congestive heart failure, atrial fibrillation (anticoagulated on apixaban), irritable bowel syndrome, diverticulosis, seizure disorder, iron deficiency anemia, cervical and lumbar generative disc disease, history of L2-S1 decompression ion 2/15/2006, history of cervical decompression, history of spinal cord stimulator implant on 9/18/2013 with subsequent removal, arthritis, peripheral neuropathy, history of epigastric hernia repair on 12/7/2023, history of hysterectomy on 10/2/2017, history of left intertrochanteric femur fracture status post open reduction internal fixation with intramedullary device on 12/11/2018, chronic pain on opioids. She lives in [place], [place]. She is . Her primary care physician is Dr. Alphonse Boothe, BRIJESH.               She presented to [facility] on 1/16/2024 for symptoms of septic joint that had been present for "a long time". She had been "unable to do anything", basically lying in bed most of the day due to inability to bear weight on her left leg due to pain. She has chronic pain, for which she takes "2 Percocets every 6 hours", which was not relieving her pain for the past 2 to 3 weeks. She also had dry mouth. She was tachycardic and labs showed leukocytosis. CT showed fluid around her left hip previous surgical cite. She was in atrial fibrillation with rapid ventricular response and was converted to sinus rhythm with diltiazem and amiodarone transfusions. She was given antibiotics. She was transferred to [facility] on the night of 1/17/2024 for Orthopedic Surgery evaluation and admitted to Hospital Medicine Team S.   "

## 2024-01-19 ENCOUNTER — ANESTHESIA (OUTPATIENT)
Dept: SURGERY | Facility: HOSPITAL | Age: 79
DRG: 480 | End: 2024-01-19
Payer: MEDICARE

## 2024-01-19 ENCOUNTER — ANESTHESIA EVENT (OUTPATIENT)
Dept: SURGERY | Facility: HOSPITAL | Age: 79
DRG: 480 | End: 2024-01-19
Payer: MEDICARE

## 2024-01-19 LAB
BASOPHILS # BLD AUTO: 0.05 K/UL (ref 0–0.2)
BASOPHILS NFR BLD: 0.3 % (ref 0–1.9)
BLD PROD TYP BPU: NORMAL
BLD PROD TYP BPU: NORMAL
BLOOD UNIT EXPIRATION DATE: NORMAL
BLOOD UNIT EXPIRATION DATE: NORMAL
BLOOD UNIT TYPE CODE: 7300
BLOOD UNIT TYPE CODE: 7300
BLOOD UNIT TYPE: NORMAL
BLOOD UNIT TYPE: NORMAL
CODING SYSTEM: NORMAL
CODING SYSTEM: NORMAL
CROSSMATCH INTERPRETATION: NORMAL
CROSSMATCH INTERPRETATION: NORMAL
DIFFERENTIAL METHOD BLD: ABNORMAL
DISPENSE STATUS: NORMAL
DISPENSE STATUS: NORMAL
EOSINOPHIL # BLD AUTO: 0.1 K/UL (ref 0–0.5)
EOSINOPHIL NFR BLD: 0.5 % (ref 0–8)
ERYTHROCYTE [DISTWIDTH] IN BLOOD BY AUTOMATED COUNT: 16.4 % (ref 11.5–14.5)
HCT VFR BLD AUTO: 26.7 % (ref 37–48.5)
HGB BLD-MCNC: 8.1 G/DL (ref 12–16)
IMM GRANULOCYTES # BLD AUTO: 0.31 K/UL (ref 0–0.04)
IMM GRANULOCYTES NFR BLD AUTO: 1.7 % (ref 0–0.5)
LYMPHOCYTES # BLD AUTO: 1 K/UL (ref 1–4.8)
LYMPHOCYTES NFR BLD: 5.3 % (ref 18–48)
MCH RBC QN AUTO: 24 PG (ref 27–31)
MCHC RBC AUTO-ENTMCNC: 30.3 G/DL (ref 32–36)
MCV RBC AUTO: 79 FL (ref 82–98)
MONOCYTES # BLD AUTO: 1.2 K/UL (ref 0.3–1)
MONOCYTES NFR BLD: 6.4 % (ref 4–15)
NEUTROPHILS # BLD AUTO: 16 K/UL (ref 1.8–7.7)
NEUTROPHILS NFR BLD: 85.8 % (ref 38–73)
NRBC BLD-RTO: 0 /100 WBC
NUM UNITS TRANS PACKED RBC: NORMAL
NUM UNITS TRANS PACKED RBC: NORMAL
PLATELET # BLD AUTO: 696 K/UL (ref 150–450)
PMV BLD AUTO: 9.2 FL (ref 9.2–12.9)
RBC # BLD AUTO: 3.37 M/UL (ref 4–5.4)
VANCOMYCIN SERPL-MCNC: 22.5 UG/ML
WBC # BLD AUTO: 18.65 K/UL (ref 3.9–12.7)

## 2024-01-19 PROCEDURE — 80202 ASSAY OF VANCOMYCIN: CPT | Performed by: STUDENT IN AN ORGANIZED HEALTH CARE EDUCATION/TRAINING PROGRAM

## 2024-01-19 PROCEDURE — 25000003 PHARM REV CODE 250: Performed by: NURSE ANESTHETIST, CERTIFIED REGISTERED

## 2024-01-19 PROCEDURE — 88304 TISSUE EXAM BY PATHOLOGIST: CPT | Mod: 26,,, | Performed by: PATHOLOGY

## 2024-01-19 PROCEDURE — 63600175 PHARM REV CODE 636 W HCPCS: Performed by: NURSE ANESTHETIST, CERTIFIED REGISTERED

## 2024-01-19 PROCEDURE — P9016 RBC LEUKOCYTES REDUCED: HCPCS | Performed by: STUDENT IN AN ORGANIZED HEALTH CARE EDUCATION/TRAINING PROGRAM

## 2024-01-19 PROCEDURE — 0SRB0EZ REPLACEMENT OF LEFT HIP JOINT WITH ARTICULATING SPACER, OPEN APPROACH: ICD-10-PCS | Performed by: ORTHOPAEDIC SURGERY

## 2024-01-19 PROCEDURE — 88311 DECALCIFY TISSUE: CPT | Performed by: PATHOLOGY

## 2024-01-19 PROCEDURE — 36000710: Performed by: ORTHOPAEDIC SURGERY

## 2024-01-19 PROCEDURE — 25000003 PHARM REV CODE 250: Performed by: STUDENT IN AN ORGANIZED HEALTH CARE EDUCATION/TRAINING PROGRAM

## 2024-01-19 PROCEDURE — 94761 N-INVAS EAR/PLS OXIMETRY MLT: CPT

## 2024-01-19 PROCEDURE — 0S9B00Z DRAINAGE OF LEFT HIP JOINT WITH DRAINAGE DEVICE, OPEN APPROACH: ICD-10-PCS | Performed by: ORTHOPAEDIC SURGERY

## 2024-01-19 PROCEDURE — 0SBB0ZZ EXCISION OF LEFT HIP JOINT, OPEN APPROACH: ICD-10-PCS | Performed by: ORTHOPAEDIC SURGERY

## 2024-01-19 PROCEDURE — D9220A PRA ANESTHESIA: Mod: ANES,,, | Performed by: ANESTHESIOLOGY

## 2024-01-19 PROCEDURE — 63600175 PHARM REV CODE 636 W HCPCS: Performed by: STUDENT IN AN ORGANIZED HEALTH CARE EDUCATION/TRAINING PROGRAM

## 2024-01-19 PROCEDURE — 85025 COMPLETE CBC W/AUTO DIFF WBC: CPT | Performed by: STUDENT IN AN ORGANIZED HEALTH CARE EDUCATION/TRAINING PROGRAM

## 2024-01-19 PROCEDURE — 99223 1ST HOSP IP/OBS HIGH 75: CPT | Mod: 57,,, | Performed by: ORTHOPAEDIC SURGERY

## 2024-01-19 PROCEDURE — 63600175 PHARM REV CODE 636 W HCPCS: Performed by: ANESTHESIOLOGY

## 2024-01-19 PROCEDURE — 88304 TISSUE EXAM BY PATHOLOGIST: CPT | Performed by: PATHOLOGY

## 2024-01-19 PROCEDURE — 37000008 HC ANESTHESIA 1ST 15 MINUTES: Performed by: ORTHOPAEDIC SURGERY

## 2024-01-19 PROCEDURE — 88311 DECALCIFY TISSUE: CPT | Mod: 26,,, | Performed by: PATHOLOGY

## 2024-01-19 PROCEDURE — 71000039 HC RECOVERY, EACH ADD'L HOUR: Performed by: ORTHOPAEDIC SURGERY

## 2024-01-19 PROCEDURE — 36000711: Performed by: ORTHOPAEDIC SURGERY

## 2024-01-19 PROCEDURE — 71000033 HC RECOVERY, INTIAL HOUR: Performed by: ORTHOPAEDIC SURGERY

## 2024-01-19 PROCEDURE — 20600001 HC STEP DOWN PRIVATE ROOM

## 2024-01-19 PROCEDURE — 71000016 HC POSTOP RECOV ADDL HR: Performed by: ORTHOPAEDIC SURGERY

## 2024-01-19 PROCEDURE — 0QP704Z REMOVAL OF INTERNAL FIXATION DEVICE FROM LEFT UPPER FEMUR, OPEN APPROACH: ICD-10-PCS | Performed by: ORTHOPAEDIC SURGERY

## 2024-01-19 PROCEDURE — 27201423 OPTIME MED/SURG SUP & DEVICES STERILE SUPPLY: Performed by: ORTHOPAEDIC SURGERY

## 2024-01-19 PROCEDURE — C1776 JOINT DEVICE (IMPLANTABLE): HCPCS | Performed by: ORTHOPAEDIC SURGERY

## 2024-01-19 PROCEDURE — 71000015 HC POSTOP RECOV 1ST HR: Performed by: ORTHOPAEDIC SURGERY

## 2024-01-19 PROCEDURE — 30233N1 TRANSFUSION OF NONAUTOLOGOUS RED BLOOD CELLS INTO PERIPHERAL VEIN, PERCUTANEOUS APPROACH: ICD-10-PCS | Performed by: ORTHOPAEDIC SURGERY

## 2024-01-19 PROCEDURE — 99222 1ST HOSP IP/OBS MODERATE 55: CPT | Mod: ,,, | Performed by: INTERNAL MEDICINE

## 2024-01-19 PROCEDURE — D9220A PRA ANESTHESIA: Mod: CRNA,,, | Performed by: NURSE ANESTHETIST, CERTIFIED REGISTERED

## 2024-01-19 PROCEDURE — 27122 RECONSTRUCTION OF HIP SOCKET: CPT | Mod: LT,,, | Performed by: ORTHOPAEDIC SURGERY

## 2024-01-19 PROCEDURE — 36415 COLL VENOUS BLD VENIPUNCTURE: CPT | Performed by: STUDENT IN AN ORGANIZED HEALTH CARE EDUCATION/TRAINING PROGRAM

## 2024-01-19 PROCEDURE — 37000009 HC ANESTHESIA EA ADD 15 MINS: Performed by: ORTHOPAEDIC SURGERY

## 2024-01-19 PROCEDURE — 20700 MNL PREP&INSJ DP RX DLVR DEV: CPT | Mod: ,,, | Performed by: ORTHOPAEDIC SURGERY

## 2024-01-19 PROCEDURE — C1713 ANCHOR/SCREW BN/BN,TIS/BN: HCPCS | Performed by: ORTHOPAEDIC SURGERY

## 2024-01-19 PROCEDURE — 63600175 PHARM REV CODE 636 W HCPCS: Performed by: ORTHOPAEDIC SURGERY

## 2024-01-19 PROCEDURE — 86920 COMPATIBILITY TEST SPIN: CPT | Performed by: STUDENT IN AN ORGANIZED HEALTH CARE EDUCATION/TRAINING PROGRAM

## 2024-01-19 PROCEDURE — 27301 DRAIN THIGH/KNEE LESION: CPT | Mod: 51,LT,, | Performed by: ORTHOPAEDIC SURGERY

## 2024-01-19 DEVICE — CEMENT BONE WHOLE BATCH: Type: IMPLANTABLE DEVICE | Site: HIP | Status: FUNCTIONAL

## 2024-01-19 DEVICE — KIT GRAFT BONE/SUB STIM 20ML: Type: IMPLANTABLE DEVICE | Site: HIP | Status: FUNCTIONAL

## 2024-01-19 DEVICE — IMPLANTABLE DEVICE: Type: IMPLANTABLE DEVICE | Site: HIP | Status: FUNCTIONAL

## 2024-01-19 DEVICE — CEMENT BONE LV G PALACOS 1X40: Type: IMPLANTABLE DEVICE | Site: HIP | Status: FUNCTIONAL

## 2024-01-19 DEVICE — CEMENT BONE SURG SMPLX P RADPQ: Type: IMPLANTABLE DEVICE | Site: HIP | Status: FUNCTIONAL

## 2024-01-19 RX ORDER — VANCOMYCIN HYDROCHLORIDE 1 G/20ML
INJECTION, POWDER, LYOPHILIZED, FOR SOLUTION INTRAVENOUS
Status: DISCONTINUED | OUTPATIENT
Start: 2024-01-19 | End: 2024-01-19 | Stop reason: HOSPADM

## 2024-01-19 RX ORDER — DEXAMETHASONE SODIUM PHOSPHATE 4 MG/ML
INJECTION, SOLUTION INTRA-ARTICULAR; INTRALESIONAL; INTRAMUSCULAR; INTRAVENOUS; SOFT TISSUE
Status: DISCONTINUED | OUTPATIENT
Start: 2024-01-19 | End: 2024-01-19

## 2024-01-19 RX ORDER — ROCURONIUM BROMIDE 10 MG/ML
INJECTION, SOLUTION INTRAVENOUS
Status: DISCONTINUED | OUTPATIENT
Start: 2024-01-19 | End: 2024-01-19

## 2024-01-19 RX ORDER — PROPOFOL 10 MG/ML
VIAL (ML) INTRAVENOUS
Status: DISCONTINUED | OUTPATIENT
Start: 2024-01-19 | End: 2024-01-19

## 2024-01-19 RX ORDER — HYDROMORPHONE HYDROCHLORIDE 1 MG/ML
0.2 INJECTION, SOLUTION INTRAMUSCULAR; INTRAVENOUS; SUBCUTANEOUS EVERY 5 MIN PRN
Status: DISCONTINUED | OUTPATIENT
Start: 2024-01-19 | End: 2024-01-19 | Stop reason: HOSPADM

## 2024-01-19 RX ORDER — FENTANYL CITRATE 50 UG/ML
INJECTION, SOLUTION INTRAMUSCULAR; INTRAVENOUS
Status: DISCONTINUED | OUTPATIENT
Start: 2024-01-19 | End: 2024-01-19

## 2024-01-19 RX ORDER — TOBRAMYCIN 40 MG/ML
INJECTION INTRAMUSCULAR; INTRAVENOUS
Status: DISCONTINUED | OUTPATIENT
Start: 2024-01-19 | End: 2024-01-19 | Stop reason: HOSPADM

## 2024-01-19 RX ORDER — HYDROMORPHONE HYDROCHLORIDE 2 MG/ML
INJECTION, SOLUTION INTRAMUSCULAR; INTRAVENOUS; SUBCUTANEOUS
Status: DISCONTINUED | OUTPATIENT
Start: 2024-01-19 | End: 2024-01-19

## 2024-01-19 RX ORDER — CEFAZOLIN SODIUM 1 G/3ML
INJECTION, POWDER, FOR SOLUTION INTRAMUSCULAR; INTRAVENOUS
Status: DISCONTINUED | OUTPATIENT
Start: 2024-01-19 | End: 2024-01-19

## 2024-01-19 RX ORDER — HALOPERIDOL 5 MG/ML
0.5 INJECTION INTRAMUSCULAR EVERY 10 MIN PRN
Status: DISCONTINUED | OUTPATIENT
Start: 2024-01-19 | End: 2024-01-19 | Stop reason: HOSPADM

## 2024-01-19 RX ORDER — TRANEXAMIC ACID 100 MG/ML
INJECTION, SOLUTION INTRAVENOUS
Status: DISCONTINUED | OUTPATIENT
Start: 2024-01-19 | End: 2024-01-19

## 2024-01-19 RX ORDER — DEXMEDETOMIDINE HYDROCHLORIDE 100 UG/ML
INJECTION, SOLUTION INTRAVENOUS
Status: DISCONTINUED | OUTPATIENT
Start: 2024-01-19 | End: 2024-01-19

## 2024-01-19 RX ORDER — LIDOCAINE HYDROCHLORIDE 20 MG/ML
INJECTION INTRAVENOUS
Status: DISCONTINUED | OUTPATIENT
Start: 2024-01-19 | End: 2024-01-19

## 2024-01-19 RX ORDER — ONDANSETRON HYDROCHLORIDE 2 MG/ML
INJECTION, SOLUTION INTRAVENOUS
Status: DISCONTINUED | OUTPATIENT
Start: 2024-01-19 | End: 2024-01-19

## 2024-01-19 RX ORDER — PHENYLEPHRINE HYDROCHLORIDE 10 MG/ML
INJECTION INTRAVENOUS
Status: DISCONTINUED | OUTPATIENT
Start: 2024-01-19 | End: 2024-01-19

## 2024-01-19 RX ADMIN — SODIUM CHLORIDE: 0.9 INJECTION, SOLUTION INTRAVENOUS at 09:01

## 2024-01-19 RX ADMIN — TRANEXAMIC ACID 1000 MG: 100 INJECTION, SOLUTION INTRAVENOUS at 11:01

## 2024-01-19 RX ADMIN — HYDROMORPHONE HYDROCHLORIDE 0.2 MG: 1 INJECTION, SOLUTION INTRAMUSCULAR; INTRAVENOUS; SUBCUTANEOUS at 03:01

## 2024-01-19 RX ADMIN — ONDANSETRON 4 MG: 2 INJECTION INTRAMUSCULAR; INTRAVENOUS at 01:01

## 2024-01-19 RX ADMIN — CEFAZOLIN 2 G: 330 INJECTION, POWDER, FOR SOLUTION INTRAMUSCULAR; INTRAVENOUS at 10:01

## 2024-01-19 RX ADMIN — PROPOFOL 100 MG: 10 INJECTION, EMULSION INTRAVENOUS at 10:01

## 2024-01-19 RX ADMIN — PHENYLEPHRINE HYDROCHLORIDE 50 MCG: 10 INJECTION INTRAVENOUS at 12:01

## 2024-01-19 RX ADMIN — DEXMEDETOMIDINE 8 MCG: 200 INJECTION, SOLUTION INTRAVENOUS at 01:01

## 2024-01-19 RX ADMIN — HYDROMORPHONE HYDROCHLORIDE 0.2 MG: 2 INJECTION INTRAMUSCULAR; INTRAVENOUS; SUBCUTANEOUS at 01:01

## 2024-01-19 RX ADMIN — ACETAMINOPHEN 1000 MG: 500 TABLET ORAL at 06:01

## 2024-01-19 RX ADMIN — DEXMEDETOMIDINE 4 MCG: 200 INJECTION, SOLUTION INTRAVENOUS at 12:01

## 2024-01-19 RX ADMIN — MUPIROCIN: 20 OINTMENT TOPICAL at 09:01

## 2024-01-19 RX ADMIN — VANCOMYCIN HYDROCHLORIDE 750 MG: 750 INJECTION, POWDER, LYOPHILIZED, FOR SOLUTION INTRAVENOUS at 06:01

## 2024-01-19 RX ADMIN — NYSTATIN 500000 UNITS: 100000 SUSPENSION ORAL at 09:01

## 2024-01-19 RX ADMIN — HYDROMORPHONE HYDROCHLORIDE 0.4 MG: 2 INJECTION INTRAMUSCULAR; INTRAVENOUS; SUBCUTANEOUS at 01:01

## 2024-01-19 RX ADMIN — METHOCARBAMOL 500 MG: 500 TABLET ORAL at 09:01

## 2024-01-19 RX ADMIN — ROCURONIUM BROMIDE 10 MG: 10 INJECTION, SOLUTION INTRAVENOUS at 11:01

## 2024-01-19 RX ADMIN — METHOCARBAMOL 500 MG: 500 TABLET ORAL at 03:01

## 2024-01-19 RX ADMIN — FENTANYL CITRATE 25 MCG: 50 INJECTION, SOLUTION INTRAMUSCULAR; INTRAVENOUS at 11:01

## 2024-01-19 RX ADMIN — DEXMEDETOMIDINE 8 MCG: 200 INJECTION, SOLUTION INTRAVENOUS at 11:01

## 2024-01-19 RX ADMIN — DEXAMETHASONE SODIUM PHOSPHATE 4 MG: 4 INJECTION, SOLUTION INTRAMUSCULAR; INTRAVENOUS at 10:01

## 2024-01-19 RX ADMIN — PREGABALIN 200 MG: 100 CAPSULE ORAL at 03:01

## 2024-01-19 RX ADMIN — PIPERACILLIN SODIUM AND TAZOBACTAM SODIUM 4.5 G: 4; .5 INJECTION, POWDER, FOR SOLUTION INTRAVENOUS at 04:01

## 2024-01-19 RX ADMIN — SUGAMMADEX 200 MG: 100 INJECTION, SOLUTION INTRAVENOUS at 01:01

## 2024-01-19 RX ADMIN — SODIUM CHLORIDE, SODIUM ACETATE ANHYDROUS, SODIUM GLUCONATE, POTASSIUM CHLORIDE, AND MAGNESIUM CHLORIDE: 526; 222; 502; 37; 30 INJECTION, SOLUTION INTRAVENOUS at 12:01

## 2024-01-19 RX ADMIN — OXYCODONE HYDROCHLORIDE 5 MG: 5 TABLET ORAL at 03:01

## 2024-01-19 RX ADMIN — TRANEXAMIC ACID 1000 MG: 100 INJECTION, SOLUTION INTRAVENOUS at 12:01

## 2024-01-19 RX ADMIN — DOCUSATE SODIUM 100 MG: 100 CAPSULE, LIQUID FILLED ORAL at 09:01

## 2024-01-19 RX ADMIN — ACETAMINOPHEN 1000 MG: 500 TABLET ORAL at 12:01

## 2024-01-19 RX ADMIN — ROCURONIUM BROMIDE 10 MG: 10 INJECTION, SOLUTION INTRAVENOUS at 12:01

## 2024-01-19 RX ADMIN — ROCURONIUM BROMIDE 50 MG: 10 INJECTION, SOLUTION INTRAVENOUS at 10:01

## 2024-01-19 RX ADMIN — DIPHENHYDRAMINE HYDROCHLORIDE 5 ML: 25 SOLUTION ORAL at 09:01

## 2024-01-19 RX ADMIN — METOPROLOL TARTRATE 50 MG: 50 TABLET, FILM COATED ORAL at 09:01

## 2024-01-19 RX ADMIN — PREGABALIN 200 MG: 100 CAPSULE ORAL at 09:01

## 2024-01-19 RX ADMIN — LIDOCAINE HYDROCHLORIDE 5 ML: 20 SOLUTION ORAL; TOPICAL at 06:01

## 2024-01-19 RX ADMIN — ACETAMINOPHEN 1000 MG: 500 TABLET ORAL at 11:01

## 2024-01-19 RX ADMIN — HYDROMORPHONE HYDROCHLORIDE 0.5 MG: 0.5 INJECTION, SOLUTION INTRAMUSCULAR; INTRAVENOUS; SUBCUTANEOUS at 09:01

## 2024-01-19 RX ADMIN — LIDOCAINE HYDROCHLORIDE 80 MG: 20 INJECTION INTRAVENOUS at 10:01

## 2024-01-19 RX ADMIN — PROPOFOL 20 MG: 10 INJECTION, EMULSION INTRAVENOUS at 11:01

## 2024-01-19 RX ADMIN — FENTANYL CITRATE 50 MCG: 50 INJECTION, SOLUTION INTRAMUSCULAR; INTRAVENOUS at 10:01

## 2024-01-19 RX ADMIN — LIDOCAINE HYDROCHLORIDE 5 ML: 20 SOLUTION ORAL; TOPICAL at 09:01

## 2024-01-19 NOTE — PLAN OF CARE
Patient arrived to [facility], AAOx3, c/o mouth pain. Awaiting magic mouthwash from pharmacy.  20g x2 LUE flushed saline locked, c/d/I. Boyd to gravity noted. LLE +2 pedal pulse, cap refill <3 sec.  Safety checklist completed. Monitoring continued.

## 2024-01-19 NOTE — ASSESSMENT & PLAN NOTE
Froilan Ray is a 78 y.o. female w/PMH of HTN, HLD, CHF, chronic pain on opioids, peripheral neuropathy, L-intertroch fx s/p TFNA (12/11/2018, Dr. Eulalio De La Cruz), who presents as transfer from OSH with large left hip fluid collection and XR showing femoral head collapse with cut-through of the TFNA helical blade. Blood cx + for MRSA, on vanc/ceftaroline, ID consulted. Pt underwent IR aspiration of L hip on 1/18 with 72cc of juan pus aspirated, gram stain + for gram positive cocci in clusters.    Pt will require I&D, removal of CMN and prostalac hip spacer placement vs girdlestone in the operating room. Hgb 8.1, 1 unit given overnight. OR today.         Pain control: MM  PT/OT: NWB LLE  DVT PPx: eliquis 5 bid for a fib, SCDs at all times when not ambulating  Abx: ceftaroline/ vanc  Blood cx: MRSA

## 2024-01-19 NOTE — SUBJECTIVE & OBJECTIVE
Principal Problem:Sepsis due to methicillin resistant Staphylococcus aureus (MRSA)    Principal Orthopedic Problem: Left hip septic arthritis and infected CMN    Interval History: Pt underwent IR aspiration of L hip abscess with 72cc of juan pus aspirated. VSS. Hgb 7.7. Transfusing 1 unit overnight. Diet NPO at mn in case surgery possible for tomorrow.    Review of patient's allergies indicates:  No Known Allergies    Current Facility-Administered Medications   Medication    (Magic mouthwash) 1:1:1 diphenhydrAMINE(Benadryl) 12.5mg/5ml liq, aluminum & magnesium hydroxide-simethicone (Maalox), LIDOcaine viscous 2%    [START ON 1/19/2024] acetaminophen tablet 1,000 mg    aluminum-magnesium hydroxide-simethicone 200-200-20 mg/5 mL suspension 30 mL    amiodarone tablet 200 mg    amLODIPine tablet 5 mg    apixaban tablet 5 mg    diphenhydrAMINE (BENADRYL) 50 mg/mL injection    docusate sodium capsule 100 mg    EScitalopram oxalate tablet 20 mg    guaiFENesin 100 mg/5 ml syrup 200 mg    oxyCODONE immediate release tablet 5 mg    And    oxyCODONE immediate release tablet Tab 10 mg    And    HYDROmorphone injection 0.5 mg    LIDOcaine viscous HCl 2% oral solution 5 mL    melatonin tablet 6 mg    methocarbamoL tablet 500 mg    metoprolol tartrate (LOPRESSOR) tablet 50 mg    mupirocin 2 % ointment    naloxone 0.4 mg/mL injection 0.02 mg    nystatin 100,000 unit/mL suspension 500,000 Units    ondansetron disintegrating tablet 8 mg    piperacillin-tazobactam (ZOSYN) 4.5 g in dextrose 5 % in water (D5W) 100 mL IVPB (MB+)    polyethylene glycol packet 17 g    pregabalin capsule 200 mg    simethicone chewable tablet 80 mg    sodium chloride 0.9% flush 1-10 mL    vancomycin (VANCOCIN) 1,000 mg in dextrose 5 % (D5W) 250 mL IVPB (Vial-Mate)    vancomycin - pharmacy to dose     Objective:     Vital Signs (Most Recent):  Temp: 98.1 °F (36.7 °C) (01/18/24 1911)  Pulse: 82 (01/18/24 1911)  Resp: 18 (01/18/24 1911)  BP: (!) 115/59  "(01/18/24 1911)  SpO2: 98 % (01/18/24 1911) Vital Signs (24h Range):  Temp:  [97.2 °F (36.2 °C)-100.6 °F (38.1 °C)] 98.1 °F (36.7 °C)  Pulse:  [69-92] 82  Resp:  [13-23] 18  SpO2:  [91 %-100 %] 98 %  BP: (106-152)/(56-74) 115/59     Weight: 67.6 kg (149 lb)  Height: 5' 6" (167.6 cm)  Body mass index is 24.05 kg/m².      Intake/Output Summary (Last 24 hours) at 1/18/2024 2102  Last data filed at 1/18/2024 1822  Gross per 24 hour   Intake 1022 ml   Output 450 ml   Net 572 ml        Ortho/SPM Exam   Ortho/SPM Exam  LLE  Skin intact with mild swelling over lateral hip  Pain with Log roll of leg  No bony TTP throughout  Compartments soft  Painless ROM ankle   SILT Sa/Vargas/DP/SP/T  Motor intact TA/SP/DP  2+ DP and PT     RLE:  Skin intact, no deformity  No TTP  Compartments soft  Full painless ROM throughout lower extremity  SILT Sa/Vargas/DP/SP/T  Motor intact TA/SP/DP  2+ DP/PT     BUE:  Skin intact, no deformity noted  No open wounds/abrasions/crepitus  No bony TTP  FROM shoulder, elbow and wrist  SILT M/U/R  Motor intact AIN/PIN/M/U/R   Cap refill < 2s  2+ RP  Significant Labs: All pertinent labs within the past 24 hours have been reviewed.    Significant Imaging: I have reviewed all pertinent imaging results/findings.  "

## 2024-01-19 NOTE — ASSESSMENT & PLAN NOTE
Patient has a history of atrial fibrillation and initially went into RVR at outside hospital.  Converted back to sinus rhythm with CCB and amiodarone infusions.  Cardiology was consulted and recommended continuing home amiodarone, metoprolol, and resuming home Eliquis (as patient was noncompliant).  We will monitor on telemetry

## 2024-01-19 NOTE — ASSESSMENT & PLAN NOTE
Froilan Ray is a 78 y.o. female w/PMH of HTN, HLD, CHF, chronic pain on opioids, peripheral neuropathy, L-intertroch fx s/p TFNA (12/11/2018, Dr. Eulalio De La Cruz), who presents as transfer from OSH with large left hip fluid collection and XR showing femoral head collapse with cut-through of the TFNA helical blade. Blood cx + for MRSA, on vanc/ceftaroline, ID consulted. Pt underwent IR aspiration of L hip on 1/18 with 72cc of juan pus aspirated, gram stain + for gram positive cocci in clusters.    Pt will require I&D, removal of CMN and prostalac hip spacer placement vs girdlestone in the operating room. Hgb 7.7. Will transfuse 1 unit tonight and keep NPO at MN if it is possible to get to her surgery tomorrow.         Pain control: MM  PT/OT: NWB LLE  DVT PPx: eliquis 5 bid for a fib, SCDs at all times when not ambulating  Abx: ceftaroline/ vanc  Blood cx: MRSA

## 2024-01-19 NOTE — HOSPITAL COURSE
She underwent joint aspiration finding purulent fluid with culture growing Staphylococcus. Blood culture grew methicillin-resistant Staphylococcus aureus. She was put on piperacillin-tazobactam and vancomycin initially. Piperacillin-tazobactam was stopped. Orthopedic Surgery performed hip washout and debridement with proximal femoral resection with placement of antibiotic cement spacer, removal of cephalomedullary nail, incision and drainage with irrigation of deep abscess, excisional debridement of fascia and muscle. Drain and wound vac were placed. She developed painful vesicular lesions on her tongue after starting trimethoprim-sulfamethoxazole. She was given viscous lidocaine and empiric valganciclovir. HSV PCR was indeterminate. HSV DNA was detected but could not differentiate between 1 and 2. Infectious Disease was consulted and recommended switching to daptomycin at discharge and giving 8 mg/kg daily until 2/29/2024. Repeat blood culture had no growth. PICC was placed. Lumbar spine noted no infection. Physical and Occupational Therapy were consulted. She declined skilled nursing facility placement. Orthopedic Surgery performed another incision and debridement on 1/25/2024 due to worsening signs of infection, but no purulence was found and Orthopedic Surgery suspected another source of infection. She had no new symptoms and felt well enough to go home. Chest X-ray showed no pneumonia. Urinalysis showed no urine infection. Leukocytosis resolved on 1/27/2024. She had urinary retention with greater than 600 mL of urine retained on bladder scan each day and required in-and-out catheterization every day. However, she was not getting up to use the commode and trying to urinate while lying in bed. Boyd catheter was placed and she was referred to Urology for outpatient voiding trial when she becomes more mobile. Orthopedic Surgery removed her drain on 1/29/2024 and said she was okay to discharge from their  perspective. She was discharged home with home health.

## 2024-01-19 NOTE — HPI
78F with h/o HTN, CHF admitted 1/17 as transfer from [facility] for concern for septic joint. Per h&p, pt had reported unable to bear weight on L leg due to hip pain and +fevers. CT showed fluid around her L hip previous surgical site, concerning for septic arthritis. Was in AFib with RVR, and converted to sinus rhythm with cardizem and amiodarone infusions. She was given antibiotics and transferred here for Ortho evaluation.     Taken to OR today 1/19 for: Left hip proximal femoral resection with placement of antibiotic cement spacer                          Removal of cephalomedullary nail left femur  Incision and drainage with irrigation deep abscess left buttock and thigh  Excisional debridement of fascia and muscle left hip, 100 sq cm - 15599, 01/01/2004 6 times 4      Pt just out of surgery and is sedated. Unable to answer any questions    Bcx 1/16 with MRSA; repeat drawn 1/18 NGTD pending    Methicillin resistant Staphylococcus aureus       CULTURE, BLOOD     Ceftriaxone >32 mcg/mL Resistant     Clindamycin <=0.5 mcg/mL Sensitive     Erythromycin >4 mcg/mL Resistant     Oxacillin >2 mcg/mL Resistant     Penicillin >8 mcg/mL Resistant     Tetracycline <=4 mcg/mL Sensitive     Trimeth/Sulfa >2/38 mcg/mL Resistant     Vancomycin 1 mcg/mL Sensitive        Hip aspirated 1/18  Aerobic Bacterial Culture      Abnormal   STAPHYLOCOCCUS AUREUS  Many  For susceptibility see order #[id number]  P      Resulting Agency [facility]              Narrative  Performed by: [facility]  Left hip joint aspiration             OR cultures sent today, pending        2d echo    Left Ventricle: The left ventricle is normal in size. Normal wall thickness. There is concentric remodeling. Normal wall motion. There is low normal systolic function with a visually estimated ejection fraction of 50 - 55%. There is normal diastolic function.    Right Ventricle: Normal right ventricular cavity size. Wall thickness is normal. Right ventricle wall motion  is normal.  "Systolic function is normal.    Aortic Valve: There is moderate aortic valve sclerosis. There is moderate annular calcification present.    Mitral Valve: There is severe mitral annular calcification present.    Aorta: Aortic root is normal in size measuring 3.37 cm. Ascending aorta is normal measuring 3.51 cm.    Pulmonary Artery: The estimated pulmonary artery systolic pressure is 25 mmHg.    IVC/SVC: Normal venous pressure at 3 mmHg.         Currently on vanc/zosyn    HSV pcr sent of lip blister- pending    ID consulted for "painful tongue lesions, HSV?, painful oral blisters after starting bactrim, SJS?, antibiotic recs for septic joint currently on Vanc   "

## 2024-01-19 NOTE — PROGRESS NOTES
[facility] - Telemetry Stepdown  Orthopedics  Progress Note    Patient Name: Froilan Ray  MRN: [id number]  Admission Date: 1/17/2024  Hospital Length of Stay: 2 days  Attending Provider: Dianne Vila MD  Primary Care Provider: Alphonse Boothe III, MD  Follow-up For: Procedure(s) (LRB):  Removal of short CMN (TFNA-synthes) and placement of antibiotic hip spacer (Depuy), left, lateral, peg board, betadine, peroxide, dakins, synthes nail removal set (Left)    Post-Operative Day: Day of Surgery  Subjective:     Principal Problem:Staphylococcal arthritis of left hip    Principal Orthopedic Problem: Left hip septic arthritis and infected CMN    Interval History: VSS. Hgb 8.1, 1 unit given overnight so likely true hgb is now higher. Plan for OR today.     Review of patient's allergies indicates:  No Known Allergies    Current Facility-Administered Medications   Medication    (Magic mouthwash) 1:1:1 diphenhydrAMINE(Benadryl) 12.5mg/5ml liq, aluminum & magnesium hydroxide-simethicone (Maalox), LIDOcaine viscous 2%    acetaminophen tablet 1,000 mg    aluminum-magnesium hydroxide-simethicone 200-200-20 mg/5 mL suspension 30 mL    amiodarone tablet 200 mg    amLODIPine tablet 5 mg    docusate sodium capsule 100 mg    EScitalopram oxalate tablet 20 mg    guaiFENesin 100 mg/5 ml syrup 200 mg    oxyCODONE immediate release tablet 5 mg    And    oxyCODONE immediate release tablet Tab 10 mg    And    HYDROmorphone injection 0.5 mg    LIDOcaine viscous HCl 2% oral solution 5 mL    melatonin tablet 6 mg    methocarbamoL tablet 500 mg    metoprolol tartrate (LOPRESSOR) tablet 50 mg    mupirocin 2 % ointment    naloxone 0.4 mg/mL injection 0.02 mg    nystatin 100,000 unit/mL suspension 500,000 Units    ondansetron disintegrating tablet 8 mg    piperacillin-tazobactam (ZOSYN) 4.5 g in dextrose 5 % in water (D5W) 100 mL IVPB (MB+)    polyethylene glycol packet 17 g    pregabalin capsule 200 mg    simethicone chewable tablet 80 mg  "   sodium chloride 0.9% flush 1-10 mL    vancomycin - pharmacy to dose    vancomycin 750 mg in dextrose 5 % (D5W) 250 mL IVPB (Vial-Mate)     Objective:     Vital Signs (Most Recent):  Temp: 98.6 °F (37 °C) (01/19/24 0452)  Pulse: 78 (01/19/24 0452)  Resp: 18 (01/19/24 0409)  BP: 125/72 (01/19/24 0452)  SpO2: 96 % (01/19/24 0452) Vital Signs (24h Range):  Temp:  [97.2 °F (36.2 °C)-100.6 °F (38.1 °C)] 98.6 °F (37 °C)  Pulse:  [66-92] 78  Resp:  [13-23] 18  SpO2:  [92 %-100 %] 96 %  BP: (114-152)/(56-72) 125/72     Weight: 67.6 kg (149 lb)  Height: 5' 6" (167.6 cm)  Body mass index is 24.05 kg/m².      Intake/Output Summary (Last 24 hours) at 1/19/2024 0731  Last data filed at 1/19/2024 0414  Gross per 24 hour   Intake 1022 ml   Output 400 ml   Net 622 ml         Ortho/SPM Exam   Ortho/SPM Exam  LLE  Skin intact with mild swelling over lateral hip  Pain with Log roll of leg  No bony TTP throughout  Compartments soft  Painless ROM ankle   SILT Sa/Vargas/DP/SP/T  Motor intact TA/SP/DP  2+ DP and PT     RLE:  Skin intact, no deformity  No TTP  Compartments soft  Full painless ROM throughout lower extremity  SILT Sa/Vargas/DP/SP/T  Motor intact TA/SP/DP  2+ DP/PT     BUE:  Skin intact, no deformity noted  No open wounds/abrasions/crepitus  No bony TTP  FROM shoulder, elbow and wrist  SILT M/U/R  Motor intact AIN/PIN/M/U/R   Cap refill < 2s  2+ RP  Significant Labs: All pertinent labs within the past 24 hours have been reviewed.    Significant Imaging: I have reviewed all pertinent imaging results/findings.  Assessment/Plan:     * Staphylococcal arthritis of left hip  Froilan Ray is a 78 y.o. female w/PMH of HTN, HLD, CHF, chronic pain on opioids, peripheral neuropathy, L-intertroch fx s/p TFNA (12/11/2018, Dr. Eulalio De La Cruz), who presents as transfer from OSH with large left hip fluid collection and XR showing femoral head collapse with cut-through of the TFNA helical blade. Blood cx + for MRSA, on vanc/ceftaroline, ID " consulted. Pt underwent IR aspiration of L hip on 1/18 with 72cc of juan pus aspirated, gram stain + for gram positive cocci in clusters.    Pt will require I&D, removal of CMN and prostalac hip spacer placement vs girdlestone in the operating room. Hgb 8.1, 1 unit given overnight. OR today.         Pain control: MM  PT/OT: [facility] LLE  DVT PPx: eliquis 5 bid for a fib, SCDs at all times when not ambulating  Abx: ceftaroline/ vanc  Blood cx: MRSA        Painful orthopaedic hardware  .          Paul Jimenez MD  Orthopedics  [facility] - Telemetry Stepdown

## 2024-01-19 NOTE — ASSESSMENT & PLAN NOTE
- Bcx+ MRSA, source likely left hip  - repeat Bcx ordered  - no vegetation noted on ECHO  - cont vanc  - ID consulted for abx recs

## 2024-01-19 NOTE — ASSESSMENT & PLAN NOTE
"78F with h/o HTN, CHF admitted 1/17 as transfer from [facility] for concern for septic L hip joint. Taken to OR today 1/19 for: Left hip proximal femoral resection with placement of antibiotic cement spacer, Removal of cephalomedullary nail left femur, Incision and drainage with irrigation deep abscess left buttock and thigh, Excisional debridement of fascia and muscle left hip. Bcx 1/16 with MRSA; repeat drawn 1/18 NGTD. 2d echo- adequate study, no veg. Currently on vanc/zosy. HSV pcr sent of lip blister- pending. ID consulted for "painful tongue lesions, HSV?, painful oral blisters after starting bactrim, SJS?, antibiotic recs for septic joint currently on Vanc     Unclear source of bacteremia- history currently limited by post-op sedation. Will obtain further history tomorrow. Appreciate ortho help with source control of L hip joint infection/abscess.     Recommendations:   - continue vancomycin, pharmacy dosing   - consider stopping zosyn   - f/u repeat bcx to document clearance; repeat if positive  - f/u HSV pcr  - will need prolonged course of iv antibiotics (likely vancomycin), but hold off on picc placement until repeat bcx clear x 48-72h  "

## 2024-01-19 NOTE — OP NOTE
OP NOTE    DOS:  01/19/2024    Preop Dx: Left hip septic arthritis    History of cephalomedullary nail fixation left intertrochanteric femur fracture    Massive abscess left buttock and thigh    Postop Dx: Left hip septic arthritis    History of cephalomedullary nail fixation left intertrochanteric femur fracture    Massive abscess left buttock and thigh    Procedure: Left hip proximal femoral resection/girdlestone for infection - 48469    Placement of antibiotic spacer left hip - 64314    Removal of cephalomedullary nail left femur - 13449  Incision and drainage with irrigation deep abscess left buttock and thigh  Excisional debridement of fascia and muscle left hip, 100 sq cm - 89640, 01/01/2004 6 times 4    Surgeon: Bulmaro Tellez M.D.    Asst:  Paul Jimenez M.D    Anesthesia: GETA    EBL:  250 cc    IVF:  50 cc crystalloid, 1 unit PRBC (chronic anemia)    Implants: [employer] modular femoral head 46, modular femoral stem small    Stimulan Calcium sulfate beads with vanc/tobra    2g Vanc, 4g Cefepime powder      Specimens: Femoral head    Findings: Good fit of Prostalac.  Hip stable through range of motion.  Massive debridement and washout.    Dispo:  To PACU extubated/stable       Indications for Procedure:      78-year-old female presented with a large gluteal and lateral thigh abscess overlying a cephalomedullary nail placed 6 years ago.  Patient also with septic arthritis of the left hip confirmed by IR aspiration with copious amounts of purulence.  I am taking her to the operating room for incision and drainage of proven large abscesses, removal of the cephalomedullary nail and proximal femoral resection with likely antibiotic hip spacer.  The risks, benefits and alternatives to surgery discussed at length with the patient prior to going to the operating room.  Informed consent was obtained.    Procedure in Detail:    Patient was identified in preoperative holding area and the site was marked.  Patient  was wheeled to the operating room and general endotracheal anesthesia induced on the patient's hospital bed.  Patient was moved to the operating room table and turned into a lateral decubitus position with the left hip up and all bony prominences well padded.  Left hip and lower extremity were prepped and draped in sterile fashion.  A time-out was undertaken to confirm patient, side, site, surgery, surgeon and administration of preoperative antibiotics.  All agreed we proceeded.      I marked out the previous incisions for cephalomedullary nail insertion.  I drew out a standard posterolateral approach to the hip going through the proximal incision and showing the areas for distal interlocking screw insertion.  This was an intermediate nail with a short nail screw position.  I incised the skin subcutaneous tissues.  I then incised the iliotibial band and gluteal fascia in line with the skin incision.  I placed a Charnley retractor.  I placed a retractor underneath the gluteus medius and immediately liberated a large amount of purulence from the posterior aspect of the buttock.  This was suctioned and a cursory irrigation undertaken.    I made incision more distally over the screw insertion site for the hip screw and incised also through iliotibial band in this area.  I liberated another large area of purulence.  I did a cursory irrigation with normal saline solution and suctioned out all the fluid in both places.  I digitally palpated into the greater notch and around the entire posterior hip and lateral thigh until I felt that all pockets of purulence were opened up.  I did another irrigation with normal saline solution and suctioned all the fluid out of the area.    At this point there was significant amount of pedunculated fascia and muscle which appeared unhealthy.  I used a combination of electrocautery and a 10 blade to excise about 100 sq cm of unhealthy tissue.    At this point the piriformis and short  external rotators were incised and tagged with Ethibond suture.  A trapezoidal capsulotomy was performed and the corners tagged with 1. Vicryl suture.  The hip was dislocated.  There was significant collapse of the femoral head.  Identified the proximal portion of the nail and used a curette to gain access to this.  I threaded the removal tool into the hip screw.  I loosened the cephalic screw proximally and then removed the hip screw in its entirety.  I placed the extraction tool in the proximal portion of the nail and then removed the distal interlocking screw.  I then used a mallet to remove the nail.    At this point I used the saw to remove the proximal femur in line with the cut for an arthroplasty.  I sent the femoral head for pathology.  I used pulse lavage to thoroughly irrigate the entire area with normal saline solution.  At this point I then used a rongeur to remove any bone inside the calcar.  I then used sequential reaming up to size cement 6 to clear out any infected bone within the proximal aspect of the femur.  I then broached to a cement 6 size with the broaches for the temo prosthesis.  I then curetted out any remaining bone from inside the area of the calcar and the proximal shaft of the femur as well as around the hole from the removed hip screw.    After got to the point were all the surrounding tissue looked healthy, I irrigated copiously with normal saline solution to include the canal of the femur.  I then irrigated with 1 L of Bactisure throughout all the wounds and let this sit for a period of time.  I then irrigated again with an equal amount of normal saline solution via pulse lavage.  I then sequentially irrigated with Dakin solution and dilute peroxide as well as dilute Betadine, leading edge of the sit for several minutes and then irrigated with normal saline solution in between.    After thoroughly irrigating and at the conclusion of the debridement I sized potential antibiotic coated  hip spacers from [employer].  I tried to size the long stem this was much too tight fit within the canal without significant increased removal of bone.  I settled on the small stem with the 46 mm head which I trialed and fit appropriately with good hip stability.  I then placed the final antibiotic coated implant after setting the height of the femoral head for appropriate leg length.  I put [employer] antibiotic impregnated cement around the calcar to hold the position of the stem and the appropriate anteversion.  This was allowed to dry.  The hip was then re-reduced and taken through range of motion and noted to be fairly stable with the antibiotic implant.    At this point copious irrigation was again undertaken with normal saline solution.  Dilute Betadine was then allowed to sit for several minutes followed again by copious irrigation with normal saline solution.  The short external rotators were then repaired to the greater trochanter.  A 10 round channel drain was placed deep and run out the skin distally.    Additional antibiotic calcium sulfate Stimulan beads were placed into the wound followed by 2 g of vancomycin powder and 4 g of cefepime powder split throughout the wounds.  The iliotibial band and gluteal fascia were closed with 1. Vicryl interrupted antimicrobial sutures, the next layer fascia with antimicrobial 0 Vicryl suture, the subcutaneous tissue with 2-0 Vicryl suture and the skin with skin staples.  At this point a Prevena incisional wound VAC was placed getting good suction seal.  A sterile dressing was also placed over the deep drain.    All instrument and sponge counts were reported correct in the case.  There were no complications.  The patient was returned to supine position on hospital bed, extubated, awakened and taken to recovery room in stable condition.    Plan the patient:    We will follow the cultures and treat accordingly with IV antibiotics.  My hope is that at some point we can  feel fairly certain that we have cleared the infection, likely with a repeat Bactisure after her inflammatory markers have normalized, and returned to the operating room for conversion to a total hip arthroplasty.  There will be a high risk for continued/recurrent infection at that point time I would hopefully like to give her a useful hip.  Multimodal pain management limiting narcotics.  Toe-touch weight-bearing left lower extremity.    Bulmaro Tellez MD

## 2024-01-19 NOTE — SUBJECTIVE & OBJECTIVE
Interval History: Patient reporting hip and mouth pain. Painful lesions on tongue noted. Family reports was being treated for thrush but lesions do not appear to be thrush, they are vesicular appearing. Family also report lesions started after taking bactrim.     S/p hip aspiration with purulent drainage, gram stain GPC. Cont vanc/zosyn. Bcx+ MRSA.    Review of Systems   Constitutional:  Positive for fever.   HENT:  Positive for mouth sores and trouble swallowing.    Respiratory:  Negative for shortness of breath.    Cardiovascular:  Negative for chest pain.   Gastrointestinal:  Negative for abdominal pain.   Genitourinary:  Negative for dysuria.   Musculoskeletal:  Positive for gait problem.        Left hip pain   Psychiatric/Behavioral:  Negative for confusion.      Objective:     Vital Signs (Most Recent):  Temp: 98.1 °F (36.7 °C) (01/18/24 1911)  Pulse: 82 (01/18/24 1911)  Resp: 18 (01/18/24 1911)  BP: (!) 115/59 (01/18/24 1911)  SpO2: 98 % (01/18/24 1911) Vital Signs (24h Range):  Temp:  [97.2 °F (36.2 °C)-100.6 °F (38.1 °C)] 98.1 °F (36.7 °C)  Pulse:  [] 82  Resp:  [13-23] 18  SpO2:  [91 %-100 %] 98 %  BP: (106-152)/(56-74) 115/59     Weight: 67.6 kg (149 lb)  Body mass index is 24.05 kg/m².    Intake/Output Summary (Last 24 hours) at 1/18/2024 1936  Last data filed at 1/18/2024 1822  Gross per 24 hour   Intake 1022 ml   Output 450 ml   Net 572 ml         Physical Exam  Vitals and nursing note reviewed.   Constitutional:       General: She is not in acute distress.     Appearance: She is ill-appearing.   HENT:      Head: Normocephalic and atraumatic.      Nose: Nose normal.      Mouth/Throat:      Mouth: Mucous membranes are moist.      Comments: Gray vesicular lesions on tongue, and angular cheilitis noted. no white plaques inside mouth  Eyes:      Extraocular Movements: Extraocular movements intact.      Conjunctiva/sclera: Conjunctivae normal.      Pupils: Pupils are equal, round, and reactive to  light.   Cardiovascular:      Rate and Rhythm: Normal rate and regular rhythm.      Pulses: Normal pulses.      Heart sounds: Normal heart sounds.   Pulmonary:      Effort: Pulmonary effort is normal.      Breath sounds: Normal breath sounds.   Abdominal:      General: Abdomen is flat. Bowel sounds are normal. There is no distension.      Palpations: Abdomen is soft.      Tenderness: There is no abdominal tenderness.   Musculoskeletal:      Cervical back: Normal range of motion and neck supple.      Left lower leg: Edema present.      Comments: Unable to move left hip 2/2 pain, dressing in place and tender to palpation    Skin:     General: Skin is warm and dry.   Neurological:      General: No focal deficit present.      Mental Status: She is alert and oriented to person, place, and time.   Psychiatric:         Mood and Affect: Mood normal.         Behavior: Behavior normal.             Significant Labs: All pertinent labs within the past 24 hours have been reviewed.    Significant Imaging: I have reviewed all pertinent imaging results/findings within the past 24 hours.

## 2024-01-19 NOTE — SUBJECTIVE & OBJECTIVE
Principal Problem:Staphylococcal arthritis of left hip    Principal Orthopedic Problem: Left hip septic arthritis and infected CMN    Interval History: VSS. Hgb 8.1, 1 unit given overnight so likely true hgb is now higher. Plan for OR today.     Review of patient's allergies indicates:  No Known Allergies    Current Facility-Administered Medications   Medication    (Magic mouthwash) 1:1:1 diphenhydrAMINE(Benadryl) 12.5mg/5ml liq, aluminum & magnesium hydroxide-simethicone (Maalox), LIDOcaine viscous 2%    acetaminophen tablet 1,000 mg    aluminum-magnesium hydroxide-simethicone 200-200-20 mg/5 mL suspension 30 mL    amiodarone tablet 200 mg    amLODIPine tablet 5 mg    docusate sodium capsule 100 mg    EScitalopram oxalate tablet 20 mg    guaiFENesin 100 mg/5 ml syrup 200 mg    oxyCODONE immediate release tablet 5 mg    And    oxyCODONE immediate release tablet Tab 10 mg    And    HYDROmorphone injection 0.5 mg    LIDOcaine viscous HCl 2% oral solution 5 mL    melatonin tablet 6 mg    methocarbamoL tablet 500 mg    metoprolol tartrate (LOPRESSOR) tablet 50 mg    mupirocin 2 % ointment    naloxone 0.4 mg/mL injection 0.02 mg    nystatin 100,000 unit/mL suspension 500,000 Units    ondansetron disintegrating tablet 8 mg    piperacillin-tazobactam (ZOSYN) 4.5 g in dextrose 5 % in water (D5W) 100 mL IVPB (MB+)    polyethylene glycol packet 17 g    pregabalin capsule 200 mg    simethicone chewable tablet 80 mg    sodium chloride 0.9% flush 1-10 mL    vancomycin - pharmacy to dose    vancomycin 750 mg in dextrose 5 % (D5W) 250 mL IVPB (Vial-Mate)     Objective:     Vital Signs (Most Recent):  Temp: 98.6 °F (37 °C) (01/19/24 0452)  Pulse: 78 (01/19/24 0452)  Resp: 18 (01/19/24 0409)  BP: 125/72 (01/19/24 0452)  SpO2: 96 % (01/19/24 0452) Vital Signs (24h Range):  Temp:  [97.2 °F (36.2 °C)-100.6 °F (38.1 °C)] 98.6 °F (37 °C)  Pulse:  [66-92] 78  Resp:  [13-23] 18  SpO2:  [92 %-100 %] 96 %  BP: (114-152)/(56-72) 125/72  "    Weight: 67.6 kg (149 lb)  Height: 5' 6" (167.6 cm)  Body mass index is 24.05 kg/m².      Intake/Output Summary (Last 24 hours) at 1/19/2024 0731  Last data filed at 1/19/2024 0414  Gross per 24 hour   Intake 1022 ml   Output 400 ml   Net 622 ml          Ortho/SPM Exam   Ortho/SPM Exam  LLE  Skin intact with mild swelling over lateral hip  Pain with Log roll of leg  No bony TTP throughout  Compartments soft  Painless ROM ankle   SILT Sa/Vargas/DP/SP/T  Motor intact TA/SP/DP  2+ DP and PT     RLE:  Skin intact, no deformity  No TTP  Compartments soft  Full painless ROM throughout lower extremity  SILT Sa/Vargas/DP/SP/T  Motor intact TA/SP/DP  2+ DP/PT     BUE:  Skin intact, no deformity noted  No open wounds/abrasions/crepitus  No bony TTP  FROM shoulder, elbow and wrist  SILT M/U/R  Motor intact AIN/PIN/M/U/R   Cap refill < 2s  2+ RP  Significant Labs: All pertinent labs within the past 24 hours have been reviewed.    Significant Imaging: I have reviewed all pertinent imaging results/findings.  "

## 2024-01-19 NOTE — CONSULTS
"[facility] ([facility])  Infectious Disease  Consult Note    Patient Name: Forilan Ray  MRN: [id number]  Admission Date: 1/17/2024  Hospital Length of Stay: 2 days  Attending Physician: Dianne Vila MD  Primary Care Provider: Alphonse Boothe III, MD     Isolation Status: No active isolations    Patient information was obtained from patient and ER records.      Inpatient consult to Infectious Diseases  Consult performed by: Kika Armstrong MD  Consult ordered by: Dianne Vila MD        Assessment/Plan:     ID  MRSA bacteremia  78F with h/o HTN, CHF admitted 1/17 as transfer from [facility] for concern for septic L hip joint. Taken to OR today 1/19 for: Left hip proximal femoral resection with placement of antibiotic cement spacer, Removal of cephalomedullary nail left femur, Incision and drainage with irrigation deep abscess left buttock and thigh, Excisional debridement of fascia and muscle left hip. Bcx 1/16 with MRSA; repeat drawn 1/18 NGTD. 2d echo- adequate study, no veg. Currently on vanc/zosy. HSV pcr sent of lip blister- pending. ID consulted for "painful tongue lesions, HSV?, painful oral blisters after starting bactrim, SJS?, antibiotic recs for septic joint currently on Vanc     Unclear source of bacteremia- history currently limited by post-op sedation. Will obtain further history tomorrow. Appreciate ortho help with source control of L hip joint infection/abscess.     Recommendations:   - continue vancomycin, pharmacy dosing   - consider stopping zosyn   - f/u repeat bcx to document clearance; repeat if positive  - f/u HSV pcr  - will need prolonged course of iv antibiotics (likely vancomycin), but hold off on picc placement until repeat bcx clear x 48-72h        Thank you for your consult. I will follow-up with patient. Please contact us if you have any additional questions.    Kika Armstrong MD  Infectious Disease  [facility] (2nd Fl)    Subjective:     Principal Problem: " Staphylococcal arthritis of left hip    HPI: 78F with h/o HTN, CHF admitted 1/17 as transfer from [facility] for concern for septic joint. Per h&p, pt had reported unable to bear weight on L leg due to hip pain and +fevers. CT showed fluid around her L hip previous surgical site, concerning for septic arthritis. Was in AFib with RVR, and converted to sinus rhythm with cardizem and amiodarone infusions. She was given antibiotics and transferred here for Ortho evaluation.     Taken to OR today 1/19 for: Left hip proximal femoral resection with placement of antibiotic cement spacer                          Removal of cephalomedullary nail left femur  Incision and drainage with irrigation deep abscess left buttock and thigh  Excisional debridement of fascia and muscle left hip, 100 sq cm - 33091, 01/01/2004 6 times 4      Pt just out of surgery and is sedated. Unable to answer any questions    Bcx 1/16 with MRSA; repeat drawn 1/18 NGTD pending    Methicillin resistant Staphylococcus aureus       CULTURE, BLOOD     Ceftriaxone >32 mcg/mL Resistant     Clindamycin <=0.5 mcg/mL Sensitive     Erythromycin >4 mcg/mL Resistant     Oxacillin >2 mcg/mL Resistant     Penicillin >8 mcg/mL Resistant     Tetracycline <=4 mcg/mL Sensitive     Trimeth/Sulfa >2/38 mcg/mL Resistant     Vancomycin 1 mcg/mL Sensitive        Hip aspirated 1/18  Aerobic Bacterial Culture      Abnormal   STAPHYLOCOCCUS AUREUS  Many  For susceptibility see order #[id number]  P      Resulting Agency [facility]              Narrative  Performed by: [facility]  Left hip joint aspiration             OR cultures sent today, pending        2d echo    Left Ventricle: The left ventricle is normal in size. Normal wall thickness. There is concentric remodeling. Normal wall motion. There is low normal systolic function with a visually estimated ejection fraction of 50 - 55%. There is normal diastolic function.    Right Ventricle: Normal right ventricular cavity size. Wall thickness is  "normal. Right ventricle wall motion  is normal. Systolic function is normal.    Aortic Valve: There is moderate aortic valve sclerosis. There is moderate annular calcification present.    Mitral Valve: There is severe mitral annular calcification present.    Aorta: Aortic root is normal in size measuring 3.37 cm. Ascending aorta is normal measuring 3.51 cm.    Pulmonary Artery: The estimated pulmonary artery systolic pressure is 25 mmHg.    IVC/SVC: Normal venous pressure at 3 mmHg.         Currently on vanc/zosyn    HSV pcr sent of lip blister- pending    ID consulted for "painful tongue lesions, HSV?, painful oral blisters after starting bactrim, SJS?, antibiotic recs for septic joint currently on Vanc     Past Medical History:   Diagnosis Date    Anemia     Arthritis     Bleeding ulcer 07/2016    Chronic pain     DDD (degenerative disc disease), cervical     DDD (degenerative disc disease), lumbar     Depression     Disc degeneration, lumbosacral     Diverticulitis     Encounter for blood transfusion     Hypertension     IBS (irritable bowel syndrome)     Neuropathy of both feet     Seizures     none  since 2017    Umbilical hernia 2020       Past Surgical History:   Procedure Laterality Date    BACK SURGERY      BREAST BIOPSY      BREAST SURGERY Right     lumpectomy    CAUDAL EPIDURAL STEROID INJECTION N/A 01/28/2022    Procedure: Injection-steroid-epidural-caudal;  Surgeon: Luzmaria Marcelino MD;  Location: [facility] OR;  Service: Pain Management;  Laterality: N/A;    COLONOSCOPY N/A 01/14/2022    Procedure: COLONOSCOPY;  Surgeon: Abby Landers MD;  Location: [facility];  Service: Endoscopy;  Laterality: N/A;    ENDOSCOPIC ULTRASOUND OF UPPER GASTROINTESTINAL TRACT N/A 07/21/2020    Procedure: ULTRASOUND, UPPER GI TRACT, ENDOSCOPIC;  Surgeon: Marcio Nguyễn III, MD;  Location: [facility] ENDO;  Service: Endoscopy;  Laterality: N/A;    ESOPHAGOGASTRODUODENOSCOPY N/A 01/14/2022    Procedure: EGD " (ESOPHAGOGASTRODUODENOSCOPY);  Surgeon: Abby Landers MD;  Location: [facility] ENDO;  Service: Endoscopy;  Laterality: N/A;    ESOPHAGOGASTRODUODENOSCOPY N/A 06/10/2022    Procedure: EGD (ESOPHAGOGASTRODUODENOSCOPY);  Surgeon: Abby Landers MD;  Location: [facility] ENDO;  Service: Endoscopy;  Laterality: N/A;    EYE SURGERY      cataract    HYSTERECTOMY      INTRAMEDULLARY RODDING OF TROCHANTER OF FEMUR Left 12/11/2018    Procedure: INSERTION, INTRAMEDULLARY SANTOS, FEMUR, TROCHANTER;  Surgeon: Eulalio De La Cruz MD;  Location: [facility] OR;  Service: Orthopedics;  Laterality: Left;    REPAIR OF EPIGASTRIC HERNIA N/A 12/7/2023    Procedure: REPAIR, HERNIA, EPIGASTRIC;  Surgeon: Vipul Escobar MD;  Location: [facility] OR;  Service: General;  Laterality: N/A;    SPINAL CORD STIMULATOR IMPLANT  09/18/2013    and removal    SPINE SURGERY  2006    lumbar L2-S1 decompression.    SPINE SURGERY      cervical decompression    TONSILLECTOMY         Review of patient's allergies indicates:  No Known Allergies    No current facility-administered medications on file prior to encounter.     Current Outpatient Medications on File Prior to Encounter   Medication Sig    acetaminophen (TYLENOL) 325 MG tablet Take 650 mg by mouth every 8 (eight) hours as needed for Pain.    amiodarone (PACERONE) 200 MG Tab Take 1 tablet (200 mg total) by mouth once daily. (Patient not taking: Reported on 12/6/2023)    amlodipine-benazepril 5-20 mg (LOTREL) 5-20 mg per capsule Take 1 capsule by mouth once daily.    apixaban (ELIQUIS) 2.5 mg Tab Take 1 tablet (2.5 mg total) by mouth 2 (two) times daily.    cyclobenzaprine (FLEXERIL) 10 MG tablet TAKE 1 TABLET(10 MG) BY MOUTH THREE TIMES DAILY AS NEEDED FOR MUSCLE SPASMS (Patient taking differently: Take 10 mg by mouth 3 (three) times daily as needed for Muscle spasms.)    docusate sodium (COLACE) 100 MG capsule Take 1 capsule (100 mg total) by mouth 2 (two) times daily.    EScitalopram oxalate (LEXAPRO) 20 MG  tablet TAKE 1 TABLET(20 MG) BY MOUTH EVERY DAY (Patient taking differently: Take 20 mg by mouth every evening.)    fluconazole (DIFLUCAN) 100 MG tablet Take 1 tablet (100 mg total) by mouth once daily. for 10 days    metoprolol tartrate (LOPRESSOR) 50 MG tablet Take 1 tablet (50 mg total) by mouth 3 (three) times daily. (Patient not taking: Reported on 12/6/2023)    omeprazole (PRILOSEC) 40 MG capsule TAKE 1 CAPSULE(40 MG) BY MOUTH EVERY MORNING (Patient taking differently: Take 40 mg by mouth every morning.)    oxyCODONE-acetaminophen (PERCOCET)  mg per tablet Take 1 tablet by mouth every 4 to 6 hours as needed for Pain.    pregabalin (LYRICA) 200 MG Cap TAKE 1 CAPSULE(200 MG) BY MOUTH THREE TIMES DAILY (Patient taking differently: Take 200 mg by mouth 3 (three) times daily. TAKE 1 CAPSULE(200 MG) BY MOUTH THREE TIMES DAILY)    traMADoL (ULTRAM) 50 mg tablet Take 2 tablets by mouth every 12 (twelve) hours as needed for Pain.    [DISCONTINUED] pantoprazole (PROTONIX) 40 MG tablet Take 1 tablet (40 mg total) by mouth 2 (two) times daily.     Family History       Problem Relation (Age of Onset)    COPD Brother    Coronary artery disease Father    Depression Father    Diabetes Mother, Father, Sister, Brother    Heart disease Father    Hypertension Mother, Father    Irritable bowel syndrome Mother          Tobacco Use    Smoking status: Never    Smokeless tobacco: Never   Substance and Sexual Activity    Alcohol use: No    Drug use: No    Sexual activity: Not Currently     Review of Systems   Unable to perform ROS: Patient unresponsive   All other systems reviewed and are negative.    Objective:     Vital Signs (Most Recent):  Temp: 97 °F (36.1 °C) (01/19/24 1400)  Pulse: 86 (01/19/24 1447)  Resp: 19 (01/19/24 1447)  BP: 113/70 (01/19/24 1447)  SpO2: 100 % (01/19/24 1447) Vital Signs (24h Range):  Temp:  [97 °F (36.1 °C)-100.6 °F (38.1 °C)] 97 °F (36.1 °C)  Pulse:  [66-88] 86  Resp:  [10-34] 19  SpO2:  [92 %-100  %] 100 %  BP: (102-149)/(55-95) 113/70     Weight: 67.6 kg (149 lb)  Body mass index is 24.05 kg/m².     Physical Exam  Vitals and nursing note reviewed.   Constitutional:       General: She is not in acute distress.     Appearance: Normal appearance. She is well-developed. She is not diaphoretic.      Comments: Pale and sedated   HENT:      Head: Normocephalic and atraumatic.      Mouth/Throat:      Mouth: Mucous membranes are dry.   Eyes:      General: No scleral icterus.     Conjunctiva/sclera: Conjunctivae normal.   Neck:      Vascular: No JVD.   Cardiovascular:      Rate and Rhythm: Normal rate and regular rhythm.   Pulmonary:      Effort: Pulmonary effort is normal. No respiratory distress.   Abdominal:      General: There is no distension.      Tenderness: There is no abdominal tenderness.   Musculoskeletal:      Right lower leg: No edema.      Left lower leg: No edema.      Comments: ROM exam deferred due to reported pain   Skin:     General: Skin is warm and dry.      Coloration: Skin is not jaundiced or pale.      Comments: Surgical dressings on L hip c/d/I; drain- bloody   Neurological:      Mental Status: She is alert.      Motor: No abnormal muscle tone.                Significant Labs: All pertinent labs within the past 24 hours have been reviewed.  CBC:   Recent Labs   Lab 01/17/24 2119 01/18/24 0454 01/19/24  0443   WBC 26.97* 21.27* 18.65*   HGB 8.4* 7.7* 8.1*   HCT 26.9* 26.2* 26.7*   * 578* 696*       CMP:   Recent Labs   Lab 01/17/24 2119 01/18/24  0454   * 131*   K 3.6 3.7   CL 97 100   CO2 23 20*    92   BUN 14 13   CREATININE 1.0 0.8   CALCIUM 9.1 8.7   PROT 7.1 6.0   ALBUMIN 2.0* 1.6*   BILITOT 1.2* 1.0   ALKPHOS 287* 243*   AST 90* 63*   * 75*   ANIONGAP 13 11         Significant Imaging: I have reviewed all pertinent imaging results/findings within the past 24 hours.

## 2024-01-19 NOTE — PROGRESS NOTES
"[facility] - Telemetry [facility] Medicine  Progress Note    Patient Name: Froilan Ray  MRN: [id number]  Patient Class: IP- Inpatient   Admission Date: 1/17/2024  Length of Stay: 1 days  Attending Physician: Dianne Vila MD  Primary Care Provider: Alphonse Boothe III, MD        Subjective:     Principal Problem:Sepsis due to methicillin resistant Staphylococcus aureus (MRSA)        HPI:  Froilan Ray is a 78 y.o. female with history of L hip surgery, HTN, HLD, CHF, chronic pain on opioids, peripheral neuropathy who was transferred from [facility] for concerns for septic joint.     Patient is a poor historian regarding timing and characterization of her symptoms. She reports that for "a long time" she has been "unable to do anything," basically lying in bed most of the day as she is unable to bear weight on her L leg due to hip pain. She reports chronic hip pain for which she takes "2 Percocets every 6 hours," however this has not relieved her pain for at least the past 2-3 weeks. She denies any fever, n/v, diarrhea, or other systemic symptoms except dry mouth.     At the OSH, she met sepsis criteria with leukocytosis and tachycardia. CT showed fluid around her L hip previous surgical site, concerning for septic arthritis. Was in AFib with RVR, and converted to sinus rhythm with cardizem and amiodarone infusions. She was given antibiotics and transferred here for Ortho evaluation.     Overview/Hospital Course:  Patient admitted for septic hip. S/p aspiration with purulent fluid removal, gram stain GPC. Bcx+ MRSA. Started on vanc/zosyn. Ortho consulted and following.   Noted painful lesions on tongue after starting bactrim considering SJS vs HSV as lesions appear vesicular. ID consulted for abx recs.     Interval History: Patient reporting hip and mouth pain. Painful lesions on tongue noted. Family reports was being treated for thrush but lesions do not appear to be thrush, they are vesicular appearing. " Family also report lesions started after taking bactrim.     S/p hip aspiration with purulent drainage, gram stain GPC. Cont vanc/zosyn. Bcx+ MRSA.    Review of Systems   Constitutional:  Positive for fever.   HENT:  Positive for mouth sores and trouble swallowing.    Respiratory:  Negative for shortness of breath.    Cardiovascular:  Negative for chest pain.   Gastrointestinal:  Negative for abdominal pain.   Genitourinary:  Negative for dysuria.   Musculoskeletal:  Positive for gait problem.        Left hip pain   Psychiatric/Behavioral:  Negative for confusion.      Objective:     Vital Signs (Most Recent):  Temp: 98.1 °F (36.7 °C) (01/18/24 1911)  Pulse: 82 (01/18/24 1911)  Resp: 18 (01/18/24 1911)  BP: (!) 115/59 (01/18/24 1911)  SpO2: 98 % (01/18/24 1911) Vital Signs (24h Range):  Temp:  [97.2 °F (36.2 °C)-100.6 °F (38.1 °C)] 98.1 °F (36.7 °C)  Pulse:  [] 82  Resp:  [13-23] 18  SpO2:  [91 %-100 %] 98 %  BP: (106-152)/(56-74) 115/59     Weight: 67.6 kg (149 lb)  Body mass index is 24.05 kg/m².    Intake/Output Summary (Last 24 hours) at 1/18/2024 1936  Last data filed at 1/18/2024 1822  Gross per 24 hour   Intake 1022 ml   Output 450 ml   Net 572 ml         Physical Exam  Vitals and nursing note reviewed.   Constitutional:       General: She is not in acute distress.     Appearance: She is ill-appearing.   HENT:      Head: Normocephalic and atraumatic.      Nose: Nose normal.      Mouth/Throat:      Mouth: Mucous membranes are moist.      Comments: Gray vesicular lesions on tongue, and angular cheilitis noted. no white plaques inside mouth  Eyes:      Extraocular Movements: Extraocular movements intact.      Conjunctiva/sclera: Conjunctivae normal.      Pupils: Pupils are equal, round, and reactive to light.   Cardiovascular:      Rate and Rhythm: Normal rate and regular rhythm.      Pulses: Normal pulses.      Heart sounds: Normal heart sounds.   Pulmonary:      Effort: Pulmonary effort is normal.       Breath sounds: Normal breath sounds.   Abdominal:      General: Abdomen is flat. Bowel sounds are normal. There is no distension.      Palpations: Abdomen is soft.      Tenderness: There is no abdominal tenderness.   Musculoskeletal:      Cervical back: Normal range of motion and neck supple.      Left lower leg: Edema present.      Comments: Unable to move left hip 2/2 pain, dressing in place and tender to palpation    Skin:     General: Skin is warm and dry.   Neurological:      General: No focal deficit present.      Mental Status: She is alert and oriented to person, place, and time.   Psychiatric:         Mood and Affect: Mood normal.         Behavior: Behavior normal.             Significant Labs: All pertinent labs within the past 24 hours have been reviewed.    Significant Imaging: I have reviewed all pertinent imaging results/findings within the past 24 hours.    Assessment/Plan:      * Sepsis due to methicillin resistant Staphylococcus aureus (MRSA)  Initially presented to outside hospital with complaints of left hip pain and inability to ambulate, and met sepsis criteria with tachycardia (AFib with RVR) and leukocytosis.  Source is likely left hip septic arthritis.    Blood cultures obtained there, and lactate only mildly elevated which resolved with fluids.    We will continue vancomycin and Zosyn for presumed septic arthritis and consult Orthopedics for further evaluation.  - S/P left hip aspiration with purulent drainage- GS+ GPC, culture in process  - Bcx+ MRSA, repeat ordered.  - ECHO without vegetation  - ID consulted for antibiotic recs  - ortho following for likely need for hardware removal/hip washout     Oral lesion  - painful gray vesicular lesions noted on tongue  - previously thought to be thrush and patient started on nystatin  - does not appear to be thrush on my exam given appearance  - started few days after starting bactrim  - concern for SJS given proximity to bactrim use versus HSV  lesions, PCR lesion ordered  - viscous lidocaine ordered       MRSA bacteremia  - Bcx+ MRSA, source likely left hip  - repeat Bcx ordered  - no vegetation noted on ECHO  - cont vanc  - ID consulted for abx recs      Staphylococcal arthritis of left hip  - see sepsis due to MRSA      Painful orthopaedic hardware  - septic hip joint with hardware in place  - ortho following       (HFpEF) heart failure with preserved ejection fraction  Monitor volume status. Control HTN.   - TTE: Left Ventricle: The left ventricle is normal in size. Normal wall thickness. There is concentric remodeling. Normal wall motion. There is low normal systolic function with a visually estimated ejection fraction of 50 - 55%. There is normal diastolic function.    Right Ventricle: Normal right ventricular cavity size. Wall thickness is normal. Right ventricle wall motion  is normal. Systolic function is normal.    Aortic Valve: There is moderate aortic valve sclerosis. There is moderate annular calcification present.    Mitral Valve: There is severe mitral annular calcification present.    Aorta: Aortic root is normal in size measuring 3.37 cm. Ascending aorta is normal measuring 3.51 cm.    Pulmonary Artery: The estimated pulmonary artery systolic pressure is 25 mmHg.    IVC/SVC: Normal venous pressure at 3 mmHg.    Depression  Continue home Lexapro.        Atrial fibrillation with RVR  Patient has a history of atrial fibrillation and initially went into RVR at outside hospital.  Converted back to sinus rhythm with CCB and amiodarone infusions.  Cardiology was consulted and recommended continuing home amiodarone, metoprolol, and resuming home Eliquis (as patient was noncompliant).  We will monitor on telemetry       Iron deficiency anemia  Chronic, and stable around baseline. Monitor.     Peripheral neuropathy  Continue home Lyrica.    Essential hypertension  Continue hospital formulary of home antihypertensives.     Chronic pain with  uncomplicated opioid dependence  PDMP reviewed. Will continue pain control for septic arthritis.         VTE Risk Mitigation (From admission, onward)           Ordered     apixaban tablet 5 mg  2 times daily         01/18/24 2940     Reason for No Pharmacological VTE Prophylaxis  Once        Question:  Reasons:  Answer:  Already adequately anticoagulated on oral Anticoagulants    01/17/24 2037     IP VTE HIGH RISK PATIENT  Once         01/17/24 2037     Place sequential compression device  Until discontinued         01/17/24 2037                    Discharge Planning   BAILEY: 1/23/2024     Code Status: Full Code   Is the patient medically ready for discharge?:     Reason for patient still in hospital (select all that apply): Patient trending condition and Consult recommendations  Discharge Plan A: Home with family, Home Health                  Dianne Vila MD  Department of Hospital Medicine   [facility] - Telemetry Stepdown

## 2024-01-19 NOTE — NURSING TRANSFER
Nursing Transfer Note      1/19/2024   5:24 PM    Nurse giving handoff:Mickie ROMERO  Nurse receiving handoff:Pina ROMERO    Reason patient is being transferred: MD order    Transfer To: [id number]    Transfer via bed    Transfer with cardiac monitoring    Transported by PCT      Telemetry: Box Number [id number], Rate 94, and Rhythm nsr  Order for Tele Monitor? Yes    Additional Lines: Boyd Catheter      Medicines sent: magic mouthwash    Any special needs or follow-up needed: wound vac     Patient belongings transferred with patient: No    Chart send with patient: Yes    Notified: spouse    Patient reassessed at: 1700 (date, time)  1  Upon arrival to floor: call bell in reach and bed in lowest position

## 2024-01-19 NOTE — PROGRESS NOTES
Pharmacokinetic Assessment Follow Up: IV Vancomycin    Vancomycin serum concentration assessment(s):    The trough level was drawn correctly and can be used to guide therapy at this time. The measurement is above the desired definitive target range of 15 to 20 mcg/mL.    Vancomycin Regimen Plan:    Change regimen to Vancomycin 750 mg IV every 12 hours with next serum trough concentration measured at 1/20 prior to 4th dose on 1700    Drug levels (last 3 results):  Recent Labs   Lab Result Units 01/18/24 2022 01/19/24  0129   Vancomycin, Random ug/mL  --  22.5   Vancomycin-Trough ug/mL 29.6*  --        Pharmacy will continue to follow and monitor vancomycin.    Please contact pharmacy at extension [phone] for questions regarding this assessment.    Thank you for the consult,   Mirna Cope       Patient brief summary:  Froilan Ray is a 78 y.o. female initiated on antimicrobial therapy with IV Vancomycin for treatment of bone/joint infection        Drug Allergies:   Review of patient's allergies indicates:  No Known Allergies    Actual Body Weight:   67.6 kg    Renal Function:   Estimated Creatinine Clearance: 56 mL/min (based on SCr of 0.8 mg/dL).    Dialysis Method (if applicable):  N/A    CBC (last 72 hours):  Recent Labs   Lab Result Units 01/16/24  1437 01/17/24  1754 01/17/24 2119 01/18/24  0454   WBC K/uL 28.57* 25.34* 26.97* 21.27*   Hemoglobin g/dL 9.4* 7.6* 8.4* 7.7*   Hematocrit % 29.8* 23.9* 26.9* 26.2*   Platelets K/uL 698* 635* 756* 578*   Gran % % 88.8* 89.0* 91.1* 84.4*   Lymph % % 2.8* 3.9* 2.7* 6.6*   Mono % % 4.7 5.2 4.2 6.5   Eosinophil % % 0.0 0.0 0.0 0.0   Basophil % % 0.3 0.2 0.2 0.4   Differential Method  Automated Automated Automated Automated       Metabolic Panel (last 72 hours):  Recent Labs   Lab Result Units 01/16/24  1437 01/16/24  1519 01/17/24 2119 01/18/24  0454   Sodium mmol/L 128*  --  133* 131*   Potassium mmol/L 3.7  --  3.6 3.7   Chloride mmol/L 92*  --  97 100   CO2  mmol/L 23  --  23 20*   Glucose mg/dL 104  --  102 92   Glucose, UA   --  Negative  --   --    BUN mg/dL 15  --  14 13   Creatinine mg/dL 0.6  --  1.0 0.8   Albumin g/dL 3.0*  --  2.0* 1.6*   Total Bilirubin mg/dL 1.2*  --  1.2* 1.0   Alkaline Phosphatase U/L 243*  --  287* 243*   AST U/L 260*  --  90* 63*   ALT U/L 158*  --  105* 75*       Vancomycin Administrations:  vancomycin given in the last 96 hours                     vancomycin (VANCOCIN) 1,000 mg in dextrose 5 % (D5W) 250 mL IVPB (Vial-Mate) (mg) 1,000 mg New Bag 01/18/24 1320     1,000 mg New Bag 01/17/24 2157    vancomycin 750 mg in dextrose 5 % 250 mL IVPB (ready to mix) (mg) 750 mg New Bag 01/17/24 1050    vancomycin (VANCOCIN) 1,750 mg in dextrose 5 % (D5W) 500 mL IVPB (mg) 1,750 mg New Bag 01/16/24 2017                    Microbiologic Results:  Microbiology Results (last 7 days)       Procedure Component Value Units Date/Time    Culture, Body Fluid - Bactec [[id number]] Collected: 01/18/24 1013    Order Status: Completed Specimen: Joint Fluid from Hip, Left Updated: 01/19/24 0210     Body Fluid Culture, Sterile Gram stain: Gram positive cocci in clusters resembling Staph      01/19/2024  02:09    Narrative:      Left hip joint aspiration    Blood culture [[id number]] Collected: 01/18/24 1655    Order Status: Completed Specimen: Blood Updated: 01/19/24 0145     Blood Culture, Routine No Growth to date    Blood culture [[id number]] Collected: 01/18/24 1704    Order Status: Completed Specimen: Blood Updated: 01/19/24 0145     Blood Culture, Routine No Growth to date    Blood culture [[id number]] Collected: 01/18/24 1656    Order Status: Canceled Specimen: Blood from Peripheral, Left Arm     Blood culture [[id number]] Collected: 01/18/24 1655    Order Status: Canceled Specimen: Blood     Gram stain [[id number]] Collected: 01/18/24 1013    Order Status: Completed Specimen: Hip, Left Updated: 01/18/24 1413     Gram Stain Result Many WBC's      Many Gram  positive cocci in clusters    Narrative:      Left hip aspiration    Gram stain [[id number]] Collected: 01/18/24 1006    Order Status: Completed Specimen: Joint Fluid from Hip, Left Updated: 01/18/24 1413     Gram Stain Result Many WBC's      Many Gram positive cocci in clusters    Narrative:      LEFT Hip JOINT FLUID    Fungus culture [[id number]] Collected: 01/18/24 1013    Order Status: Sent Specimen: Hip, Left Updated: 01/18/24 1226    Aerobic culture [[id number]] Collected: 01/18/24 1013    Order Status: Sent Specimen: Hip, Left Updated: 01/18/24 1226    AFB Culture & Smear [[id number]] Collected: 01/18/24 1013    Order Status: Sent Specimen: Hip, Left Updated: 01/18/24 1225    Culture, Anaerobe [[id number]] Collected: 01/18/24 1013    Order Status: Sent Specimen: Hip, Left Updated: 01/18/24 1225    AFB Culture & Smear [[id number]] Collected: 01/18/24 1006    Order Status: Sent Specimen: Joint Fluid from Hip, Left Updated: 01/18/24 1218    Fungus culture [[id number]] Collected: 01/18/24 1006    Order Status: Sent Specimen: Joint Fluid from Hip, Left Updated: 01/18/24 1218    Culture, Anaerobic [[id number]] Collected: 01/18/24 1006    Order Status: Sent Specimen: Joint Fluid from Hip, Left Updated: 01/18/24 1217    Aerobic culture [[id number]] Collected: 01/18/24 1006    Order Status: Sent Specimen: Hip, Left Updated: 01/18/24 1217

## 2024-01-19 NOTE — ASSESSMENT & PLAN NOTE
- painful gray vesicular lesions noted on tongue  - previously thought to be thrush and patient started on nystatin  - does not appear to be thrush on my exam given appearance  - started few days after starting bactrim  - concern for SJS given proximity to bactrim use versus HSV lesions, PCR lesion ordered  - viscous lidocaine ordered

## 2024-01-19 NOTE — ASSESSMENT & PLAN NOTE
Initially presented to outside hospital with complaints of left hip pain and inability to ambulate, and met sepsis criteria with tachycardia (AFib with RVR) and leukocytosis.  Source is likely left hip septic arthritis.    Blood cultures obtained there, and lactate only mildly elevated which resolved with fluids.    We will continue vancomycin and Zosyn for presumed septic arthritis and consult Orthopedics for further evaluation.  - S/P left hip aspiration with purulent drainage- GS+ GPC, culture in process  - Bcx+ MRSA, repeat ordered.  - ECHO without vegetation  - ID consulted for antibiotic recs  - ortho following for likely need for hardware removal/hip washout

## 2024-01-19 NOTE — ASSESSMENT & PLAN NOTE
Monitor volume status. Control HTN.   - TTE: Left Ventricle: The left ventricle is normal in size. Normal wall thickness. There is concentric remodeling. Normal wall motion. There is low normal systolic function with a visually estimated ejection fraction of 50 - 55%. There is normal diastolic function.    Right Ventricle: Normal right ventricular cavity size. Wall thickness is normal. Right ventricle wall motion  is normal. Systolic function is normal.    Aortic Valve: There is moderate aortic valve sclerosis. There is moderate annular calcification present.    Mitral Valve: There is severe mitral annular calcification present.    Aorta: Aortic root is normal in size measuring 3.37 cm. Ascending aorta is normal measuring 3.51 cm.    Pulmonary Artery: The estimated pulmonary artery systolic pressure is 25 mmHg.    IVC/SVC: Normal venous pressure at 3 mmHg.

## 2024-01-19 NOTE — SUBJECTIVE & OBJECTIVE
Past Medical History:   Diagnosis Date    Anemia     Arthritis     Bleeding ulcer 07/2016    Chronic pain     DDD (degenerative disc disease), cervical     DDD (degenerative disc disease), lumbar     Depression     Disc degeneration, lumbosacral     Diverticulitis     Encounter for blood transfusion     Hypertension     IBS (irritable bowel syndrome)     Neuropathy of both feet     Seizures     none  since 2017    Umbilical hernia 2020       Past Surgical History:   Procedure Laterality Date    BACK SURGERY      BREAST BIOPSY      BREAST SURGERY Right     lumpectomy    CAUDAL EPIDURAL STEROID INJECTION N/A 01/28/2022    Procedure: Injection-steroid-epidural-caudal;  Surgeon: Luzmaria Marcelino MD;  Location: [facility];  Service: Pain Management;  Laterality: N/A;    COLONOSCOPY N/A 01/14/2022    Procedure: COLONOSCOPY;  Surgeon: Abby Landers MD;  Location: [facility] ENDO;  Service: Endoscopy;  Laterality: N/A;    ENDOSCOPIC ULTRASOUND OF UPPER GASTROINTESTINAL TRACT N/A 07/21/2020    Procedure: ULTRASOUND, UPPER GI TRACT, ENDOSCOPIC;  Surgeon: Marcio Nguyễn III, MD;  Location: [facility];  Service: Endoscopy;  Laterality: N/A;    ESOPHAGOGASTRODUODENOSCOPY N/A 01/14/2022    Procedure: EGD (ESOPHAGOGASTRODUODENOSCOPY);  Surgeon: Abby Landers MD;  Location: [facility] ENDO;  Service: Endoscopy;  Laterality: N/A;    ESOPHAGOGASTRODUODENOSCOPY N/A 06/10/2022    Procedure: EGD (ESOPHAGOGASTRODUODENOSCOPY);  Surgeon: Abby Landers MD;  Location: [facility] ENDO;  Service: Endoscopy;  Laterality: N/A;    EYE SURGERY      cataract    HYSTERECTOMY      INTRAMEDULLARY RODDING OF TROCHANTER OF FEMUR Left 12/11/2018    Procedure: INSERTION, INTRAMEDULLARY SANTOS, FEMUR, TROCHANTER;  Surgeon: Eulalio De La Cruz MD;  Location: [facility];  Service: Orthopedics;  Laterality: Left;    REPAIR OF EPIGASTRIC HERNIA N/A 12/7/2023    Procedure: REPAIR, HERNIA, EPIGASTRIC;  Surgeon: Vipul Escobar MD;  Location: [facility];  Service:  General;  Laterality: N/A;    SPINAL CORD STIMULATOR IMPLANT  09/18/2013    and removal    SPINE SURGERY  2006    lumbar L2-S1 decompression.    SPINE SURGERY      cervical decompression    TONSILLECTOMY         Review of patient's allergies indicates:  No Known Allergies    No current facility-administered medications on file prior to encounter.     Current Outpatient Medications on File Prior to Encounter   Medication Sig    acetaminophen (TYLENOL) 325 MG tablet Take 650 mg by mouth every 8 (eight) hours as needed for Pain.    amiodarone (PACERONE) 200 MG Tab Take 1 tablet (200 mg total) by mouth once daily. (Patient not taking: Reported on 12/6/2023)    amlodipine-benazepril 5-20 mg (LOTREL) 5-20 mg per capsule Take 1 capsule by mouth once daily.    apixaban (ELIQUIS) 2.5 mg Tab Take 1 tablet (2.5 mg total) by mouth 2 (two) times daily.    cyclobenzaprine (FLEXERIL) 10 MG tablet TAKE 1 TABLET(10 MG) BY MOUTH THREE TIMES DAILY AS NEEDED FOR MUSCLE SPASMS (Patient taking differently: Take 10 mg by mouth 3 (three) times daily as needed for Muscle spasms.)    docusate sodium (COLACE) 100 MG capsule Take 1 capsule (100 mg total) by mouth 2 (two) times daily.    EScitalopram oxalate (LEXAPRO) 20 MG tablet TAKE 1 TABLET(20 MG) BY MOUTH EVERY DAY (Patient taking differently: Take 20 mg by mouth every evening.)    fluconazole (DIFLUCAN) 100 MG tablet Take 1 tablet (100 mg total) by mouth once daily. for 10 days    metoprolol tartrate (LOPRESSOR) 50 MG tablet Take 1 tablet (50 mg total) by mouth 3 (three) times daily. (Patient not taking: Reported on 12/6/2023)    omeprazole (PRILOSEC) 40 MG capsule TAKE 1 CAPSULE(40 MG) BY MOUTH EVERY MORNING (Patient taking differently: Take 40 mg by mouth every morning.)    oxyCODONE-acetaminophen (PERCOCET)  mg per tablet Take 1 tablet by mouth every 4 to 6 hours as needed for Pain.    pregabalin (LYRICA) 200 MG Cap TAKE 1 CAPSULE(200 MG) BY MOUTH THREE TIMES DAILY (Patient  taking differently: Take 200 mg by mouth 3 (three) times daily. TAKE 1 CAPSULE(200 MG) BY MOUTH THREE TIMES DAILY)    traMADoL (ULTRAM) 50 mg tablet Take 2 tablets by mouth every 12 (twelve) hours as needed for Pain.    [DISCONTINUED] pantoprazole (PROTONIX) 40 MG tablet Take 1 tablet (40 mg total) by mouth 2 (two) times daily.     Family History       Problem Relation (Age of Onset)    COPD Brother    Coronary artery disease Father    Depression Father    Diabetes Mother, Father, Sister, Brother    Heart disease Father    Hypertension Mother, Father    Irritable bowel syndrome Mother          Tobacco Use    Smoking status: Never    Smokeless tobacco: Never   Substance and Sexual Activity    Alcohol use: No    Drug use: No    Sexual activity: Not Currently     Review of Systems   Unable to perform ROS: Patient unresponsive   All other systems reviewed and are negative.    Objective:     Vital Signs (Most Recent):  Temp: 97 °F (36.1 °C) (01/19/24 1400)  Pulse: 86 (01/19/24 1447)  Resp: 19 (01/19/24 1447)  BP: 113/70 (01/19/24 1447)  SpO2: 100 % (01/19/24 1447) Vital Signs (24h Range):  Temp:  [97 °F (36.1 °C)-100.6 °F (38.1 °C)] 97 °F (36.1 °C)  Pulse:  [66-88] 86  Resp:  [10-34] 19  SpO2:  [92 %-100 %] 100 %  BP: (102-149)/(55-95) 113/70     Weight: 67.6 kg (149 lb)  Body mass index is 24.05 kg/m².     Physical Exam  Vitals and nursing note reviewed.   Constitutional:       General: She is not in acute distress.     Appearance: Normal appearance. She is well-developed. She is not diaphoretic.      Comments: Pale and sedated   HENT:      Head: Normocephalic and atraumatic.      Mouth/Throat:      Mouth: Mucous membranes are dry.   Eyes:      General: No scleral icterus.     Conjunctiva/sclera: Conjunctivae normal.   Neck:      Vascular: No JVD.   Cardiovascular:      Rate and Rhythm: Normal rate and regular rhythm.   Pulmonary:      Effort: Pulmonary effort is normal. No respiratory distress.   Abdominal:       General: There is no distension.      Tenderness: There is no abdominal tenderness.   Musculoskeletal:      Right lower leg: No edema.      Left lower leg: No edema.      Comments: ROM exam deferred due to reported pain   Skin:     General: Skin is warm and dry.      Coloration: Skin is not jaundiced or pale.      Comments: Surgical dressings on L hip c/d/I; drain- bloody   Neurological:      Mental Status: She is alert.      Motor: No abnormal muscle tone.                Significant Labs: All pertinent labs within the past 24 hours have been reviewed.  CBC:   Recent Labs   Lab 01/17/24 2119 01/18/24 0454 01/19/24  0443   WBC 26.97* 21.27* 18.65*   HGB 8.4* 7.7* 8.1*   HCT 26.9* 26.2* 26.7*   * 578* 696*       CMP:   Recent Labs   Lab 01/17/24 2119 01/18/24  0454   * 131*   K 3.6 3.7   CL 97 100   CO2 23 20*    92   BUN 14 13   CREATININE 1.0 0.8   CALCIUM 9.1 8.7   PROT 7.1 6.0   ALBUMIN 2.0* 1.6*   BILITOT 1.2* 1.0   ALKPHOS 287* 243*   AST 90* 63*   * 75*   ANIONGAP 13 11         Significant Imaging: I have reviewed all pertinent imaging results/findings within the past 24 hours.

## 2024-01-19 NOTE — PROGRESS NOTES
[facility] - Telemetry Stepdown  Orthopedics  Progress Note    Patient Name: Froilan Ray  MRN: [id number]  Admission Date: 1/17/2024  Hospital Length of Stay: 1 days  Attending Provider: Dianne Vila MD  Primary Care Provider: Alphonse Boothe III, MD    Subjective:     Principal Problem:Sepsis due to methicillin resistant Staphylococcus aureus (MRSA)    Principal Orthopedic Problem: Left hip septic arthritis and infected CMN    Interval History: Pt underwent IR aspiration of L hip abscess with 72cc of juan pus aspirated. VSS. Hgb 7.7. Transfusing 1 unit overnight. Diet NPO at mn in case surgery possible for tomorrow.    Review of patient's allergies indicates:  No Known Allergies    Current Facility-Administered Medications   Medication    (Magic mouthwash) 1:1:1 diphenhydrAMINE(Benadryl) 12.5mg/5ml liq, aluminum & magnesium hydroxide-simethicone (Maalox), LIDOcaine viscous 2%    [START ON 1/19/2024] acetaminophen tablet 1,000 mg    aluminum-magnesium hydroxide-simethicone 200-200-20 mg/5 mL suspension 30 mL    amiodarone tablet 200 mg    amLODIPine tablet 5 mg    apixaban tablet 5 mg    diphenhydrAMINE (BENADRYL) 50 mg/mL injection    docusate sodium capsule 100 mg    EScitalopram oxalate tablet 20 mg    guaiFENesin 100 mg/5 ml syrup 200 mg    oxyCODONE immediate release tablet 5 mg    And    oxyCODONE immediate release tablet Tab 10 mg    And    HYDROmorphone injection 0.5 mg    LIDOcaine viscous HCl 2% oral solution 5 mL    melatonin tablet 6 mg    methocarbamoL tablet 500 mg    metoprolol tartrate (LOPRESSOR) tablet 50 mg    mupirocin 2 % ointment    naloxone 0.4 mg/mL injection 0.02 mg    nystatin 100,000 unit/mL suspension 500,000 Units    ondansetron disintegrating tablet 8 mg    piperacillin-tazobactam (ZOSYN) 4.5 g in dextrose 5 % in water (D5W) 100 mL IVPB (MB+)    polyethylene glycol packet 17 g    pregabalin capsule 200 mg    simethicone chewable tablet 80 mg    sodium chloride 0.9% flush 1-10  "mL    vancomycin (VANCOCIN) 1,000 mg in dextrose 5 % (D5W) 250 mL IVPB (Vial-Mate)    vancomycin - pharmacy to dose     Objective:     Vital Signs (Most Recent):  Temp: 98.1 °F (36.7 °C) (01/18/24 1911)  Pulse: 82 (01/18/24 1911)  Resp: 18 (01/18/24 1911)  BP: (!) 115/59 (01/18/24 1911)  SpO2: 98 % (01/18/24 1911) Vital Signs (24h Range):  Temp:  [97.2 °F (36.2 °C)-100.6 °F (38.1 °C)] 98.1 °F (36.7 °C)  Pulse:  [69-92] 82  Resp:  [13-23] 18  SpO2:  [91 %-100 %] 98 %  BP: (106-152)/(56-74) 115/59     Weight: 67.6 kg (149 lb)  Height: 5' 6" (167.6 cm)  Body mass index is 24.05 kg/m².      Intake/Output Summary (Last 24 hours) at 1/18/2024 2102  Last data filed at 1/18/2024 1822  Gross per 24 hour   Intake 1022 ml   Output 450 ml   Net 572 ml        Ortho/SPM Exam   Ortho/SPM Exam  LLE  Skin intact with mild swelling over lateral hip  Pain with Log roll of leg  No bony TTP throughout  Compartments soft  Painless ROM ankle   SILT Sa/Vargas/DP/SP/T  Motor intact TA/SP/DP  2+ DP and PT     RLE:  Skin intact, no deformity  No TTP  Compartments soft  Full painless ROM throughout lower extremity  SILT Sa/Vargas/DP/SP/T  Motor intact TA/SP/DP  2+ DP/PT     BUE:  Skin intact, no deformity noted  No open wounds/abrasions/crepitus  No bony TTP  FROM shoulder, elbow and wrist  SILT M/U/R  Motor intact AIN/PIN/M/U/R   Cap refill < 2s  2+ RP  Significant Labs: All pertinent labs within the past 24 hours have been reviewed.    Significant Imaging: I have reviewed all pertinent imaging results/findings.  Assessment/Plan:     * Staphylococcal arthritis of left hip  Froilan Ray is a 78 y.o. female w/PMH of HTN, HLD, CHF, chronic pain on opioids, peripheral neuropathy, L-intertroch fx s/p TFNA (12/11/2018, Dr. Eulalio De La Cruz), who presents as transfer from OSH with large left hip fluid collection and XR showing femoral head collapse with cut-through of the TFNA helical blade. Blood cx + for MRSA, on vanc/ceftaroline, ID consulted. Pt " underwent IR aspiration of L hip on 1/18 with 72cc of juan pus aspirated, gram stain + for gram positive cocci in clusters.    Pt will require I&D, removal of CMN and prostalac hip spacer placement vs girdlestone in the operating room. Hgb 7.7. Will transfuse 1 unit tonight and keep NPO at MN if it is possible to get to her surgery tomorrow.         Pain control: MM  PT/OT: NWB LLE  DVT PPx: recommend switching from eliquis to heparin pending surgery given shorter half life, SCDs at all times when not ambulating  Abx: ceftaroline/ vanc  Blood cx: MRSA                 Paul Jimenez MD  Orthopedics  [facility] - Telemetry Stepdown

## 2024-01-19 NOTE — TRANSFER OF CARE
"Anesthesia Transfer of Care Note    Patient: Froilan Ray    Procedure(s) Performed: Procedure(s) (LRB):  ARTHROPLASTY, HIP, GIRDLESTONE, POSTERIOR APPROACH (Left)  IRRIGATION AND DEBRIDEMENT, LOWER EXTREMITY (Left)    Patient location: PACU    Anesthesia Type: general    Transport from OR: Transported from OR on 6-10 L/min O2 by face mask with adequate spontaneous ventilation    Post pain: adequate analgesia    Post assessment: no apparent anesthetic complications    Post vital signs: stable    Level of consciousness: awake and alert    Nausea/Vomiting: no nausea/vomiting    Complications: none    Transfer of care protocol was followed      Last vitals: Visit Vitals  BP 90/51 (BP Location: Right arm, Patient Position: Lying)   Pulse 82   Temp 37.1 °C (98.7 °F) (Axillary)   Resp 20   Ht 5' 6" (1.676 m)   Wt 67.6 kg (149 lb)   SpO2 95%   BMI 24.05 kg/m²     "

## 2024-01-19 NOTE — PLAN OF CARE
Problem: Adult Inpatient Plan of Care  Goal: Plan of Care Review  Outcome: Ongoing, Progressing  Goal: Patient-Specific Goal (Individualized)  Outcome: Ongoing, Progressing  Goal: Absence of Hospital-Acquired Illness or Injury  Outcome: Ongoing, Progressing  Goal: Optimal Comfort and Wellbeing  Outcome: Ongoing, Progressing  Goal: Readiness for Transition of Care  Outcome: Ongoing, Progressing     Problem: Adjustment to Illness (Sepsis/Septic Shock)  Goal: Optimal Coping  Outcome: Ongoing, Progressing     Problem: Bleeding (Sepsis/Septic Shock)  Goal: Absence of Bleeding  Outcome: Ongoing, Progressing     Problem: Glycemic Control Impaired (Sepsis/Septic Shock)  Goal: Blood Glucose Level Within Desired Range  Outcome: Ongoing, Progressing     Problem: Infection Progression (Sepsis/Septic Shock)  Goal: Absence of Infection Signs and Symptoms  Outcome: Ongoing, Progressing     Problem: Nutrition Impaired (Sepsis/Septic Shock)  Goal: Optimal Nutrition Intake  Outcome: Ongoing, Progressing     Problem: Fluid and Electrolyte Imbalance (Acute Kidney Injury/Impairment)  Goal: Fluid and Electrolyte Balance  Outcome: Ongoing, Progressing     Problem: Oral Intake Inadequate (Acute Kidney Injury/Impairment)  Goal: Optimal Nutrition Intake  Outcome: Ongoing, Progressing     Problem: Renal Function Impairment (Acute Kidney Injury/Impairment)  Goal: Effective Renal Function  Outcome: Ongoing, Progressing     Problem: Skin Injury Risk Increased  Goal: Skin Health and Integrity  Outcome: Ongoing, Progressing   Patient underwent abscess aspiration with contents sent to lab for various testings. Echo and blood cultures completed with continuation of IV Antibiotics Vancomycin and Zosyn. Pain management with Lyrica and Percocet.

## 2024-01-19 NOTE — ANESTHESIA PREPROCEDURE EVALUATION
[facility]  Anesthesia Pre-Operative Evaluation         Patient Name/: Froilan Ray, 1945  MRN: [id number]    SUBJECTIVE:     Pre-operative evaluation for Procedure(s) (LRB):  Removal of short CMN (TFNA-synthes) and placement of antibiotic hip spacer (Depuy), left, lateral, peg board, betadine, peroxide, dakins, synthes nail removal set (Left)     2024    Froilan Ray is a 78 y.o. female     Patient now presents for the above procedure(s).    ________________________________________  Results for orders placed during the hospital encounter of 24    Echo    Interpretation Summary    Left Ventricle: The left ventricle is normal in size. Normal wall thickness. There is concentric remodeling. Normal wall motion. There is low normal systolic function with a visually estimated ejection fraction of 50 - 55%. There is normal diastolic function.    Right Ventricle: Normal right ventricular cavity size. Wall thickness is normal. Right ventricle wall motion  is normal. Systolic function is normal.    Aortic Valve: There is moderate aortic valve sclerosis. There is moderate annular calcification present.    Mitral Valve: There is severe mitral annular calcification present.    Aorta: Aortic root is normal in size measuring 3.37 cm. Ascending aorta is normal measuring 3.51 cm.    Pulmonary Artery: The estimated pulmonary artery systolic pressure is 25 mmHg.    IVC/SVC: Normal venous pressure at 3 mmHg.    ________________________________________    LDA:        Peripheral IV - Single Lumen Anterior;Distal;Left Upper Arm (Active)   Site Assessment Clean;Dry;Intact 24 0327   Extremity Assessment Distal to IV No abnormal discoloration 24 0800   Line Status Saline locked;Flushed 24 1600   Dressing Status Clean;Dry;Intact 24 1600   Dressing Intervention Integrity maintained 24 1600   Number of days:             Peripheral IV - Single Lumen 24 1000 20  G Anterior;Left Forearm (Active)   Site Assessment Clean;Dry;Intact 01/19/24 0326   Line Status Saline locked;Flushed 01/18/24 1600   Dressing Status Clean;Dry;Intact 01/18/24 1600   Dressing Intervention Integrity maintained 01/18/24 1600   Number of days: 1       Drips:       Patient Active Problem List   Diagnosis    Chronic pain with uncomplicated opioid dependence    Encounter for postoperative wound check    Uterine mass    Closed nondisplaced intertrochanteric fracture of left femur    Essential hypertension    Peripheral neuropathy    Seizure disorder    Open wound of left heel    Colitis    ACP (advance care planning)    Drug-induced constipation    Disease of biliary tract    Severe anemia    Chronic gastric ulcer with bleeding    Iron deficiency anemia    Sepsis due to methicillin resistant Staphylococcus aureus (MRSA)    PEDRO (acute kidney injury)    Atrial fibrillation with RVR    Hypokalemia    Spinal stenosis    Uses walker    History of seizures    Anticoagulated    S/P ORIF (open reduction internal fixation) fracture    Osteoporosis    Depression    Closed fracture of acetabulum    Kyphoscoliosis and scoliosis    Strangulated hernia of abdominal wall    (HFpEF) heart failure with preserved ejection fraction    Painful orthopaedic hardware    Staphylococcal arthritis of left hip    MRSA bacteremia    Oral lesion       Review of patient's allergies indicates:  No Known Allergies    Current Inpatient Medications:    acetaminophen  1,000 mg Oral Q6H    amiodarone  200 mg Oral Daily    amLODIPine  5 mg Oral Daily    docusate sodium  100 mg Oral BID    EScitalopram oxalate  20 mg Oral Daily    LIDOcaine viscous HCl 2%  5 mL Mucous Membrane Q4H    methocarbamoL  500 mg Oral TID    metoprolol tartrate  50 mg Oral BID    mupirocin   Nasal BID    nystatin  500,000 Units Oral QID    piperacillin-tazobactam (Zosyn) IV (PEDS and ADULTS) (extended infusion is not appropriate)  4.5 g Intravenous Q8H    pregabalin   200 mg Oral TID    vancomycin (VANCOCIN) IV (PEDS and ADULTS)  750 mg Intravenous Q12H       No current facility-administered medications on file prior to encounter.     Current Outpatient Medications on File Prior to Encounter   Medication Sig Dispense Refill    acetaminophen (TYLENOL) 325 MG tablet Take 650 mg by mouth every 8 (eight) hours as needed for Pain.      amiodarone (PACERONE) 200 MG Tab Take 1 tablet (200 mg total) by mouth once daily. (Patient not taking: Reported on 12/6/2023) 30 tablet 1    amlodipine-benazepril 5-20 mg (LOTREL) 5-20 mg per capsule Take 1 capsule by mouth once daily. 90 capsule 3    apixaban (ELIQUIS) 2.5 mg Tab Take 1 tablet (2.5 mg total) by mouth 2 (two) times daily. 60 tablet 1    cyclobenzaprine (FLEXERIL) 10 MG tablet TAKE 1 TABLET(10 MG) BY MOUTH THREE TIMES DAILY AS NEEDED FOR MUSCLE SPASMS (Patient taking differently: Take 10 mg by mouth 3 (three) times daily as needed for Muscle spasms.) 90 tablet 2    docusate sodium (COLACE) 100 MG capsule Take 1 capsule (100 mg total) by mouth 2 (two) times daily. 60 capsule 1    EScitalopram oxalate (LEXAPRO) 20 MG tablet TAKE 1 TABLET(20 MG) BY MOUTH EVERY DAY (Patient taking differently: Take 20 mg by mouth every evening.) 90 tablet 1    fluconazole (DIFLUCAN) 100 MG tablet Take 1 tablet (100 mg total) by mouth once daily. for 10 days 10 tablet 0    metoprolol tartrate (LOPRESSOR) 50 MG tablet Take 1 tablet (50 mg total) by mouth 3 (three) times daily. (Patient not taking: Reported on 12/6/2023) 90 tablet 1    omeprazole (PRILOSEC) 40 MG capsule TAKE 1 CAPSULE(40 MG) BY MOUTH EVERY MORNING (Patient taking differently: Take 40 mg by mouth every morning.) 90 capsule 2    oxyCODONE-acetaminophen (PERCOCET)  mg per tablet Take 1 tablet by mouth every 4 to 6 hours as needed for Pain.      pregabalin (LYRICA) 200 MG Cap TAKE 1 CAPSULE(200 MG) BY MOUTH THREE TIMES DAILY (Patient taking differently: Take 200 mg by mouth 3 (three) times  daily. TAKE 1 CAPSULE(200 MG) BY MOUTH THREE TIMES DAILY) 90 capsule 2    traMADoL (ULTRAM) 50 mg tablet Take 2 tablets by mouth every 12 (twelve) hours as needed for Pain.      [DISCONTINUED] pantoprazole (PROTONIX) 40 MG tablet Take 1 tablet (40 mg total) by mouth 2 (two) times daily. 60 tablet 4       Past Surgical History:   Procedure Laterality Date    BACK SURGERY      BREAST BIOPSY      BREAST SURGERY Right     lumpectomy    CAUDAL EPIDURAL STEROID INJECTION N/A 01/28/2022    Procedure: Injection-steroid-epidural-caudal;  Surgeon: Luzmaria Marcelino MD;  Location: [facility] OR;  Service: Pain Management;  Laterality: N/A;    COLONOSCOPY N/A 01/14/2022    Procedure: COLONOSCOPY;  Surgeon: Abby Landers MD;  Location: [facility];  Service: Endoscopy;  Laterality: N/A;    ENDOSCOPIC ULTRASOUND OF UPPER GASTROINTESTINAL TRACT N/A 07/21/2020    Procedure: ULTRASOUND, UPPER GI TRACT, ENDOSCOPIC;  Surgeon: Marcio Nguyễn III, MD;  Location: [facility];  Service: Endoscopy;  Laterality: N/A;    ESOPHAGOGASTRODUODENOSCOPY N/A 01/14/2022    Procedure: EGD (ESOPHAGOGASTRODUODENOSCOPY);  Surgeon: Abby Landers MD;  Location: [facility];  Service: Endoscopy;  Laterality: N/A;    ESOPHAGOGASTRODUODENOSCOPY N/A 06/10/2022    Procedure: EGD (ESOPHAGOGASTRODUODENOSCOPY);  Surgeon: Abby Landers MD;  Location: [facility] ENDO;  Service: Endoscopy;  Laterality: N/A;    EYE SURGERY      cataract    HYSTERECTOMY      INTRAMEDULLARY RODDING OF TROCHANTER OF FEMUR Left 12/11/2018    Procedure: INSERTION, INTRAMEDULLARY SANTOS, FEMUR, TROCHANTER;  Surgeon: Eulalio De La Cruz MD;  Location: [facility] OR;  Service: Orthopedics;  Laterality: Left;    REPAIR OF EPIGASTRIC HERNIA N/A 12/7/2023    Procedure: REPAIR, HERNIA, EPIGASTRIC;  Surgeon: Vipul Escobar MD;  Location: [facility] OR;  Service: General;  Laterality: N/A;    SPINAL CORD STIMULATOR IMPLANT  09/18/2013    and removal    SPINE SURGERY  2006    lumbar L2-S1 decompression.     SPINE SURGERY      cervical decompression    TONSILLECTOMY         Social History:  Tobacco Use: Low Risk  (1/16/2024)    Patient History     Smoking Tobacco Use: Never     Smokeless Tobacco Use: Never     Passive Exposure: Not on file       Alcohol Use: Not At Risk (1/18/2024)    AUDIT-C     Frequency of Alcohol Consumption: Never     Average Number of Drinks: Patient does not drink     Frequency of Binge Drinking: Never       OBJECTIVE:     Vital Signs Range:  BMI Readings from Last 1 Encounters:   01/18/24 24.05 kg/m²       Temp:  [36.4 °C (97.6 °F)-37.1 °C (98.7 °F)]   Pulse:  [66-86]   Resp:  [17-18]   BP: (114-149)/(58-95)   SpO2:  [94 %-97 %]        Significant Labs:        Component Value Date/Time    WBC 18.65 (H) 01/19/2024 0443    HGB 8.1 (L) 01/19/2024 0443    HCT 26.7 (L) 01/19/2024 0443     (H) 01/19/2024 0443     (L) 01/18/2024 0454    K 3.7 01/18/2024 0454     01/18/2024 0454    CO2 20 (L) 01/18/2024 0454    GLU 92 01/18/2024 0454    BUN 13 01/18/2024 0454    CREATININE 0.8 01/18/2024 0454    MG 1.8 12/06/2023 1500    PHOS 1.9 (L) 06/17/2022 0533    CALCIUM 8.7 01/18/2024 0454    ALBUMIN 1.6 (L) 01/18/2024 0454    PROT 6.0 01/18/2024 0454    ALKPHOS 243 (H) 01/18/2024 0454    BILITOT 1.0 01/18/2024 0454    AST 63 (H) 01/18/2024 0454    ALT 75 (H) 01/18/2024 0454    INR 1.1 11/30/2022 1804    INR 1.2 06/16/2022 1153    HGBA1C 5.2 12/01/2022 0447        Please see Results Review for additional labs.     Diagnostic Studies: No relevant studies.    EKG:   Results for orders placed or performed during the hospital encounter of 01/16/24   EKG 12-lead    Collection Time: 01/17/24  7:07 AM    Narrative    Test Reason : I48.91,    Vent. Rate : 083 BPM     Atrial Rate : 083 BPM     P-R Int : 158 ms          QRS Dur : 088 ms      QT Int : 300 ms       P-R-T Axes : 027 004 020 degrees     QTc Int : 352 ms    Normal sinus rhythm  Nonspecific T wave abnormality  Abnormal ECG  When compared with  ECG of 16-JAN-2024 16:27,  Sinus rhythm has replaced Atrial fibrillation  Vent. rate has decreased BY  75 BPM  ST no longer depressed in Anterior leads  Nonspecific T wave abnormality now evident in Anterior leads    Referred By: LEEANNE   SELF           Confirmed By:        ECHO:  See subjective, if available.      ASSESSMENT/PLAN:                                                                                                                  01/19/2024  Froilan Ray is a 78 y.o., female.      Pre-op Assessment          Review of Systems  Cardiovascular:     Hypertension                                        Renal/:  Chronic Renal Disease                Hepatic/GI:   PUD,               Musculoskeletal:  Arthritis          Spine Disorders:  Disc disease and Degenerative disease           Neurological:    Neuromuscular Disease,   Seizures                                Psych:    depression                Physical Exam  General: Well nourished and Alert    Airway:  Mallampati: II   Mouth Opening: Normal  TM Distance: Normal  Tongue: Normal  Neck ROM: Normal ROM    Dental:  Intact        Anesthesia Plan  Type of Anesthesia, risks & benefits discussed:    Anesthesia Type: Gen ETT  Intra-op Monitoring Plan: Standard ASA Monitors  Induction:  IV  Informed Consent: Informed consent signed with the Patient and all parties understand the risks and agree with anesthesia plan.  All questions answered.   ASA Score: 3  Day of Surgery Review of History & Physical: H&P Update referred to the surgeon/provider.    Ready For Surgery From Anesthesia Perspective.     .

## 2024-01-19 NOTE — PLAN OF CARE
Problem: Adult Inpatient Plan of Care  Goal: Plan of Care Review  Outcome: Ongoing, Progressing  Goal: Patient-Specific Goal (Individualized)  Outcome: Ongoing, Progressing  Goal: Absence of Hospital-Acquired Illness or Injury  Outcome: Ongoing, Progressing  Goal: Optimal Comfort and Wellbeing  Outcome: Ongoing, Progressing  Goal: Readiness for Transition of Care  Outcome: Ongoing, Progressing     Problem: Adjustment to Illness (Sepsis/Septic Shock)  Goal: Optimal Coping  Outcome: Ongoing, Progressing     Problem: Bleeding (Sepsis/Septic Shock)  Goal: Absence of Bleeding  Outcome: Ongoing, Progressing     Problem: Glycemic Control Impaired (Sepsis/Septic Shock)  Goal: Blood Glucose Level Within Desired Range  Outcome: Ongoing, Progressing     Pt lying in bed resting. Resp even and unlabored. VSS. No c/o pain at this time. Pt received 1 unit of blood, tolerated well. Bed in lowest position and call light in reach care is ongoing.

## 2024-01-20 LAB
ALBUMIN SERPL BCP-MCNC: 1.7 G/DL (ref 3.5–5.2)
ALP SERPL-CCNC: 176 U/L (ref 55–135)
ALT SERPL W/O P-5'-P-CCNC: 34 U/L (ref 10–44)
ANION GAP SERPL CALC-SCNC: 8 MMOL/L (ref 8–16)
AST SERPL-CCNC: 27 U/L (ref 10–40)
BACTERIA SPEC AEROBE CULT: ABNORMAL
BACTERIA SPEC AEROBE CULT: ABNORMAL
BASOPHILS # BLD AUTO: 0.04 K/UL (ref 0–0.2)
BASOPHILS NFR BLD: 0.2 % (ref 0–1.9)
BILIRUB SERPL-MCNC: 0.7 MG/DL (ref 0.1–1)
BUN SERPL-MCNC: 15 MG/DL (ref 8–23)
CALCIUM SERPL-MCNC: 9 MG/DL (ref 8.7–10.5)
CHLORIDE SERPL-SCNC: 102 MMOL/L (ref 95–110)
CO2 SERPL-SCNC: 24 MMOL/L (ref 23–29)
CREAT SERPL-MCNC: 0.8 MG/DL (ref 0.5–1.4)
DIFFERENTIAL METHOD BLD: ABNORMAL
EOSINOPHIL # BLD AUTO: 0 K/UL (ref 0–0.5)
EOSINOPHIL NFR BLD: 0 % (ref 0–8)
ERYTHROCYTE [DISTWIDTH] IN BLOOD BY AUTOMATED COUNT: 17 % (ref 11.5–14.5)
EST. GFR  (NO RACE VARIABLE): >60 ML/MIN/1.73 M^2
GLUCOSE SERPL-MCNC: 109 MG/DL (ref 70–110)
HCT VFR BLD AUTO: 27.6 % (ref 37–48.5)
HGB BLD-MCNC: 9.3 G/DL (ref 12–16)
IMM GRANULOCYTES # BLD AUTO: 0.23 K/UL (ref 0–0.04)
IMM GRANULOCYTES NFR BLD AUTO: 1.1 % (ref 0–0.5)
LYMPHOCYTES # BLD AUTO: 1 K/UL (ref 1–4.8)
LYMPHOCYTES NFR BLD: 4.9 % (ref 18–48)
MCH RBC QN AUTO: 26.2 PG (ref 27–31)
MCHC RBC AUTO-ENTMCNC: 33.7 G/DL (ref 32–36)
MCV RBC AUTO: 78 FL (ref 82–98)
MONOCYTES # BLD AUTO: 1.2 K/UL (ref 0.3–1)
MONOCYTES NFR BLD: 5.5 % (ref 4–15)
NEUTROPHILS # BLD AUTO: 18.5 K/UL (ref 1.8–7.7)
NEUTROPHILS NFR BLD: 88.3 % (ref 38–73)
NRBC BLD-RTO: 0 /100 WBC
PLATELET # BLD AUTO: 434 K/UL (ref 150–450)
PMV BLD AUTO: 10.2 FL (ref 9.2–12.9)
POTASSIUM SERPL-SCNC: 3.1 MMOL/L (ref 3.5–5.1)
PROT SERPL-MCNC: 6.2 G/DL (ref 6–8.4)
RBC # BLD AUTO: 3.55 M/UL (ref 4–5.4)
SODIUM SERPL-SCNC: 134 MMOL/L (ref 136–145)
VANCOMYCIN TROUGH SERPL-MCNC: 25.9 UG/ML (ref 10–22)
WBC # BLD AUTO: 20.94 K/UL (ref 3.9–12.7)

## 2024-01-20 PROCEDURE — 80202 ASSAY OF VANCOMYCIN: CPT | Performed by: STUDENT IN AN ORGANIZED HEALTH CARE EDUCATION/TRAINING PROGRAM

## 2024-01-20 PROCEDURE — 25000003 PHARM REV CODE 250: Performed by: STUDENT IN AN ORGANIZED HEALTH CARE EDUCATION/TRAINING PROGRAM

## 2024-01-20 PROCEDURE — 97165 OT EVAL LOW COMPLEX 30 MIN: CPT

## 2024-01-20 PROCEDURE — 36415 COLL VENOUS BLD VENIPUNCTURE: CPT | Performed by: STUDENT IN AN ORGANIZED HEALTH CARE EDUCATION/TRAINING PROGRAM

## 2024-01-20 PROCEDURE — 63600175 PHARM REV CODE 636 W HCPCS: Performed by: STUDENT IN AN ORGANIZED HEALTH CARE EDUCATION/TRAINING PROGRAM

## 2024-01-20 PROCEDURE — 20600001 HC STEP DOWN PRIVATE ROOM

## 2024-01-20 PROCEDURE — 97530 THERAPEUTIC ACTIVITIES: CPT

## 2024-01-20 PROCEDURE — 80053 COMPREHEN METABOLIC PANEL: CPT | Performed by: STUDENT IN AN ORGANIZED HEALTH CARE EDUCATION/TRAINING PROGRAM

## 2024-01-20 PROCEDURE — 99233 SBSQ HOSP IP/OBS HIGH 50: CPT | Mod: ,,, | Performed by: INTERNAL MEDICINE

## 2024-01-20 PROCEDURE — 85025 COMPLETE CBC W/AUTO DIFF WBC: CPT | Performed by: STUDENT IN AN ORGANIZED HEALTH CARE EDUCATION/TRAINING PROGRAM

## 2024-01-20 PROCEDURE — 94761 N-INVAS EAR/PLS OXIMETRY MLT: CPT

## 2024-01-20 RX ORDER — POTASSIUM CHLORIDE 7.45 MG/ML
10 INJECTION INTRAVENOUS
Status: COMPLETED | OUTPATIENT
Start: 2024-01-20 | End: 2024-01-20

## 2024-01-20 RX ORDER — VALACYCLOVIR HYDROCHLORIDE 500 MG/1
1000 TABLET, FILM COATED ORAL 2 TIMES DAILY
Status: DISCONTINUED | OUTPATIENT
Start: 2024-01-20 | End: 2024-01-29 | Stop reason: HOSPADM

## 2024-01-20 RX ORDER — ACETAMINOPHEN 325 MG/1
650 TABLET ORAL EVERY 6 HOURS PRN
Status: DISCONTINUED | OUTPATIENT
Start: 2024-01-21 | End: 2024-01-24

## 2024-01-20 RX ADMIN — ACETAMINOPHEN 1000 MG: 500 TABLET ORAL at 11:01

## 2024-01-20 RX ADMIN — NYSTATIN 500000 UNITS: 100000 SUSPENSION ORAL at 01:01

## 2024-01-20 RX ADMIN — ACETAMINOPHEN 650 MG: 325 TABLET ORAL at 11:01

## 2024-01-20 RX ADMIN — POTASSIUM CHLORIDE 10 MEQ: 7.46 INJECTION, SOLUTION INTRAVENOUS at 12:01

## 2024-01-20 RX ADMIN — METOPROLOL TARTRATE 50 MG: 50 TABLET, FILM COATED ORAL at 09:01

## 2024-01-20 RX ADMIN — NYSTATIN 500000 UNITS: 100000 SUSPENSION ORAL at 08:01

## 2024-01-20 RX ADMIN — PREGABALIN 200 MG: 100 CAPSULE ORAL at 08:01

## 2024-01-20 RX ADMIN — HYDROMORPHONE HYDROCHLORIDE 0.5 MG: 0.5 INJECTION, SOLUTION INTRAMUSCULAR; INTRAVENOUS; SUBCUTANEOUS at 06:01

## 2024-01-20 RX ADMIN — PREGABALIN 200 MG: 100 CAPSULE ORAL at 04:01

## 2024-01-20 RX ADMIN — NYSTATIN 500000 UNITS: 100000 SUSPENSION ORAL at 09:01

## 2024-01-20 RX ADMIN — POTASSIUM CHLORIDE 10 MEQ: 7.46 INJECTION, SOLUTION INTRAVENOUS at 02:01

## 2024-01-20 RX ADMIN — METHOCARBAMOL 500 MG: 500 TABLET ORAL at 04:01

## 2024-01-20 RX ADMIN — METHOCARBAMOL 500 MG: 500 TABLET ORAL at 08:01

## 2024-01-20 RX ADMIN — VALACYCLOVIR HYDROCHLORIDE 1000 MG: 500 TABLET, FILM COATED ORAL at 08:01

## 2024-01-20 RX ADMIN — VANCOMYCIN HYDROCHLORIDE 750 MG: 750 INJECTION, POWDER, LYOPHILIZED, FOR SOLUTION INTRAVENOUS at 09:01

## 2024-01-20 RX ADMIN — MUPIROCIN: 20 OINTMENT TOPICAL at 08:01

## 2024-01-20 RX ADMIN — VALACYCLOVIR HYDROCHLORIDE 1000 MG: 500 TABLET, FILM COATED ORAL at 09:01

## 2024-01-20 RX ADMIN — ACETAMINOPHEN 1000 MG: 500 TABLET ORAL at 06:01

## 2024-01-20 RX ADMIN — LIDOCAINE HYDROCHLORIDE 5 ML: 20 SOLUTION ORAL; TOPICAL at 11:01

## 2024-01-20 RX ADMIN — METHOCARBAMOL 500 MG: 500 TABLET ORAL at 09:01

## 2024-01-20 RX ADMIN — NYSTATIN 500000 UNITS: 100000 SUSPENSION ORAL at 04:01

## 2024-01-20 RX ADMIN — ESCITALOPRAM OXALATE 20 MG: 5 TABLET, FILM COATED ORAL at 09:01

## 2024-01-20 RX ADMIN — DOCUSATE SODIUM 100 MG: 100 CAPSULE, LIQUID FILLED ORAL at 09:01

## 2024-01-20 RX ADMIN — OXYCODONE HYDROCHLORIDE 5 MG: 5 TABLET ORAL at 03:01

## 2024-01-20 RX ADMIN — OXYCODONE HYDROCHLORIDE 10 MG: 10 TABLET ORAL at 11:01

## 2024-01-20 RX ADMIN — LIDOCAINE HYDROCHLORIDE 5 ML: 20 SOLUTION ORAL; TOPICAL at 06:01

## 2024-01-20 RX ADMIN — LIDOCAINE HYDROCHLORIDE 5 ML: 20 SOLUTION ORAL; TOPICAL at 03:01

## 2024-01-20 RX ADMIN — METOPROLOL TARTRATE 50 MG: 50 TABLET, FILM COATED ORAL at 08:01

## 2024-01-20 RX ADMIN — AMLODIPINE BESYLATE 5 MG: 5 TABLET ORAL at 09:01

## 2024-01-20 RX ADMIN — DIPHENHYDRAMINE HYDROCHLORIDE 5 ML: 25 SOLUTION ORAL at 09:01

## 2024-01-20 RX ADMIN — APIXABAN 5 MG: 5 TABLET, FILM COATED ORAL at 08:01

## 2024-01-20 RX ADMIN — MUPIROCIN: 20 OINTMENT TOPICAL at 09:01

## 2024-01-20 RX ADMIN — PREGABALIN 200 MG: 100 CAPSULE ORAL at 09:01

## 2024-01-20 RX ADMIN — APIXABAN 5 MG: 5 TABLET, FILM COATED ORAL at 09:01

## 2024-01-20 RX ADMIN — LIDOCAINE HYDROCHLORIDE 5 ML: 20 SOLUTION ORAL; TOPICAL at 04:01

## 2024-01-20 RX ADMIN — AMIODARONE HYDROCHLORIDE 200 MG: 200 TABLET ORAL at 09:01

## 2024-01-20 NOTE — PLAN OF CARE
Problem: Adult Inpatient Plan of Care  Goal: Patient-Specific Goal (Individualized)  Outcome: Ongoing, Progressing  Goal: Optimal Comfort and Wellbeing  Outcome: Ongoing, Progressing     Problem: Infection Progression (Sepsis/Septic Shock)  Goal: Absence of Infection Signs and Symptoms  Outcome: Ongoing, Progressing     Problem: Nutrition Impaired (Sepsis/Septic Shock)  Goal: Optimal Nutrition Intake  Outcome: Ongoing, Progressing     Problem: Fluid and Electrolyte Imbalance (Acute Kidney Injury/Impairment)  Goal: Fluid and Electrolyte Balance  Outcome: Ongoing, Progressing              Patient AOX4, VSS. Went for washout and debridement with ortho for L hip. Has wound vac and BEULAH bulb post procedure. Bed low to the floor, call light within reach. Family at the bedside.

## 2024-01-20 NOTE — ASSESSMENT & PLAN NOTE
- painful gray vesicular lesions noted on tongue  - previously thought to be thrush and patient started on nystatin  - does not appear to be thrush on my exam given appearance  - started few days after starting bactrim  - concern for SJS given proximity to bactrim use versus HSV lesions, PCR lesion pending  - viscous lidocaine ordered   - started empiric valtrex

## 2024-01-20 NOTE — ASSESSMENT & PLAN NOTE
Initially presented to outside hospital with complaints of left hip pain and inability to ambulate, and met sepsis criteria with tachycardia (AFib with RVR) and leukocytosis.  Source is likely left hip septic arthritis.    Blood cultures obtained there, and lactate only mildly elevated which resolved with fluids.    We will continue vancomycin and Zosyn for presumed septic arthritis and consult Orthopedics for further evaluation.  - S/P left hip aspiration with purulent drainage- GS+ GPC, culture + Staph   - Bcx+ MRSA, repeat NGTD  - ECHO without vegetation  - ID consulted for antibiotic recs- recommend cont vanc  - ortho following. S/p hip washout and debridement. Left hip proximal femoral resection with placement of antibiotic cement spacer, Removal of cephalomedullary nail left femur, Incision and drainage with irrigation deep abscess left buttock and thigh, Excisional debridement of fascia and muscle left hip. Drain placed.

## 2024-01-20 NOTE — PROGRESS NOTES
"[facility] - Telemetry Stepdown  Infectious Disease  Progress Note    Patient Name: Froilan Ray  MRN: [id number]  Admission Date: 1/17/2024  Length of Stay: 3 days  Attending Physician: Dianne Vila MD  Primary Care Provider: Alphonse Boothe III, MD    Isolation Status: No active isolations    Assessment/Plan:        MRSA bacteremia    78F with h/o HTN, CHF admitted 1/17 as transfer from [facility] for concern for septic L hip joint. Taken to OR today 1/19 for: Left hip proximal femoral resection with placement of antibiotic cement spacer, Removal of cephalomedullary nail left femur, Incision and drainage with irrigation deep abscess left buttock and thigh, Excisional debridement of fascia and muscle left hip. Bcx 1/16 with MRSA; repeat drawn 1/18 NGTD. 2d echo- adequate study, no veg. Currently on vanc/zosy. HSV pcr sent of lip blister- pending. ID consulted for "painful tongue lesions, HSV?, painful oral blisters after starting bactrim, SJS?, antibiotic recs for septic joint currently on Vanc     Likely buttock/gluteal abscess, necessitating to femoral head.    Recommendations:   - continue vancomycin, pharmacy dosing   - f/u repeat bcx to document clearance; repeat if positive  - f/u HSV pcr  - anticipating 6 weeks IV abx with possible po abx suppression afterwards  - check susceptibility of daptomycin    Juanito Rodriguez MD  Infectious Disease  [facility] - Telemetry Stepdown    Subjective:     Principal Problem:Staphylococcal arthritis of left hip    HPI: 78F with h/o HTN, CHF admitted 1/17 as transfer from [facility] for concern for septic joint. Per h&p, pt had reported unable to bear weight on L leg due to hip pain and +fevers. CT showed fluid around her L hip previous surgical site, concerning for septic arthritis. Was in AFib with RVR, and converted to sinus rhythm with cardizem and amiodarone infusions. She was given antibiotics and transferred here for Ortho evaluation.     Taken to OR today 1/19 for: Left " hip proximal femoral resection with placement of antibiotic cement spacer                          Removal of cephalomedullary nail left femur  Incision and drainage with irrigation deep abscess left buttock and thigh  Excisional debridement of fascia and muscle left hip, 100 sq cm - 37737, 01/01/2004 6 times 4      Pt just out of surgery and is sedated. Unable to answer any questions    Bcx 1/16 with MRSA; repeat drawn 1/18 NGTD pending    Methicillin resistant Staphylococcus aureus       CULTURE, BLOOD     Ceftriaxone >32 mcg/mL Resistant     Clindamycin <=0.5 mcg/mL Sensitive     Erythromycin >4 mcg/mL Resistant     Oxacillin >2 mcg/mL Resistant     Penicillin >8 mcg/mL Resistant     Tetracycline <=4 mcg/mL Sensitive     Trimeth/Sulfa >2/38 mcg/mL Resistant     Vancomycin 1 mcg/mL Sensitive        Hip aspirated 1/18  Aerobic Bacterial Culture      Abnormal   STAPHYLOCOCCUS AUREUS  Many  For susceptibility see order #[id number]  P      Resulting Agency [facility]              Narrative  Performed by: [facility]  Left hip joint aspiration             OR cultures sent today, pending        2d echo    Left Ventricle: The left ventricle is normal in size. Normal wall thickness. There is concentric remodeling. Normal wall motion. There is low normal systolic function with a visually estimated ejection fraction of 50 - 55%. There is normal diastolic function.    Right Ventricle: Normal right ventricular cavity size. Wall thickness is normal. Right ventricle wall motion  is normal. Systolic function is normal.    Aortic Valve: There is moderate aortic valve sclerosis. There is moderate annular calcification present.    Mitral Valve: There is severe mitral annular calcification present.    Aorta: Aortic root is normal in size measuring 3.37 cm. Ascending aorta is normal measuring 3.51 cm.    Pulmonary Artery: The estimated pulmonary artery systolic pressure is 25 mmHg.    IVC/SVC: Normal venous pressure at 3 mmHg.         Currently  "on vanc/zosyn    HSV pcr sent of lip blister- pending    ID consulted for "painful tongue lesions, HSV?, painful oral blisters after starting bactrim, SJS?, antibiotic recs for septic joint currently on Vanc   Interval History: Notes reviewed. 79 yo woman with MRSA abscess and h/o nail fixer, femur, s/p debridement with implantation of cef/vanc/tobra, now on vancomycin. Feeling okay. Lives in [place].    Review of Systems   Constitutional:  Negative for chills and fever.   Skin:  Positive for wound.   All other systems reviewed and are negative.    Objective:     Vital Signs (Most Recent):  Temp: 98.6 °F (37 °C) (01/20/24 1132)  Pulse: 77 (01/20/24 1132)  Resp: 18 (01/20/24 1154)  BP: 122/64 (01/20/24 1132)  SpO2: 99 % (01/20/24 1132) Vital Signs (24h Range):  Temp:  [97 °F (36.1 °C)-98.6 °F (37 °C)] 98.6 °F (37 °C)  Pulse:  [] 77  Resp:  [10-34] 18  SpO2:  [86 %-100 %] 99 %  BP: (102-148)/(55-81) 122/64     Weight: 67.6 kg (149 lb)  Body mass index is 24.05 kg/m².    Estimated Creatinine Clearance: 56 mL/min (based on SCr of 0.8 mg/dL).     Physical Exam  Vitals and nursing note reviewed.   HENT:      Head: Normocephalic and atraumatic.   Eyes:      Pupils: Pupils are equal, round, and reactive to light.   Musculoskeletal:      Comments: Left thigh bandaged, BEULAH in place   Neurological:      General: No focal deficit present.      Mental Status: She is alert and oriented to person, place, and time.   Psychiatric:         Mood and Affect: Mood normal.         Behavior: Behavior normal.         Thought Content: Thought content normal.         Judgment: Judgment normal.          Significant Labs: Blood Culture:   Recent Labs   Lab 01/16/24  1413 01/18/24  1655 01/18/24  1704   LABBLOO No Growth to date  No Growth to date  No Growth to date  No Growth to date  Gram stain aer bottle:  Gram positive cocci  Results called to and read back by:  Romaine Doan RN ER 1/17/24 @ 1948 JLR  METHICILLIN RESISTANT " STAPHYLOCOCCUS AUREUS  Known MRSA patient  * No Growth to date  No Growth to date No Growth to date  No Growth to date     BMP:   Recent Labs   Lab 01/20/24  0821      *   K 3.1*      CO2 24   BUN 15   CREATININE 0.8   CALCIUM 9.0     CBC:   Recent Labs   Lab 01/19/24  0443 01/20/24  0821   WBC 18.65* 20.94*   HGB 8.1* 9.3*   HCT 26.7* 27.6*   * 434     Wound Culture:   Recent Labs   Lab 01/18/24  1006 01/18/24  1013   LABAERO METHICILLIN RESISTANT STAPHYLOCOCCUS AUREUS  Many  * METHICILLIN RESISTANT STAPHYLOCOCCUS AUREUS  Many  For susceptibility see order #[id number]  *       Significant Imaging: I have reviewed all pertinent imaging results/findings within the past 24 hours.

## 2024-01-20 NOTE — ASSESSMENT & PLAN NOTE
- painful gray vesicular lesions noted on tongue  - previously thought to be thrush and patient started on nystatin  - does not appear to be thrush on my exam given appearance  - started few days after starting bactrim  - concern for SJS given proximity to bactrim use versus HSV lesions, PCR lesion pending  - viscous lidocaine ordered

## 2024-01-20 NOTE — SUBJECTIVE & OBJECTIVE
Principal Problem:Staphylococcal arthritis of left hip    Principal Orthopedic Problem: s/p nail removal and abx spacer 1/19    Interval History: POD1.  Afebrile, VSS, NAEON.  Pain has been reportedly well controlled.  Drain with 130 cc's postoperatively.  Intraoperative cultures positive for staph aureus; continuing vancomycin per ID.  Blood cultures no growth to date.         Review of patient's allergies indicates:  No Known Allergies    Current Facility-Administered Medications   Medication    (Magic mouthwash) 1:1:1 diphenhydrAMINE(Benadryl) 12.5mg/5ml liq, aluminum & magnesium hydroxide-simethicone (Maalox), LIDOcaine viscous 2%    acetaminophen tablet 1,000 mg    aluminum-magnesium hydroxide-simethicone 200-200-20 mg/5 mL suspension 30 mL    amiodarone tablet 200 mg    amLODIPine tablet 5 mg    apixaban tablet 5 mg    docusate sodium capsule 100 mg    EScitalopram oxalate tablet 20 mg    guaiFENesin 100 mg/5 ml syrup 200 mg    oxyCODONE immediate release tablet 5 mg    And    oxyCODONE immediate release tablet Tab 10 mg    And    HYDROmorphone injection 0.5 mg    LIDOcaine viscous HCl 2% oral solution 5 mL    melatonin tablet 6 mg    methocarbamoL tablet 500 mg    metoprolol tartrate (LOPRESSOR) tablet 50 mg    mupirocin 2 % ointment    naloxone 0.4 mg/mL injection 0.02 mg    nystatin 100,000 unit/mL suspension 500,000 Units    ondansetron disintegrating tablet 8 mg    polyethylene glycol packet 17 g    pregabalin capsule 200 mg    simethicone chewable tablet 80 mg    sodium chloride 0.9% flush 1-10 mL    vancomycin - pharmacy to dose    vancomycin 750 mg in dextrose 5 % (D5W) 250 mL IVPB (Vial-Mate)     Objective:     Vital Signs (Most Recent):  Temp: 97.7 °F (36.5 °C) (01/20/24 0456)  Pulse: 83 (01/20/24 0456)  Resp: 18 (01/20/24 0330)  BP: (!) 146/72 (01/20/24 0456)  SpO2: 98 % (01/20/24 0456) Vital Signs (24h Range):  Temp:  [97 °F (36.1 °C)-98.7 °F (37.1 °C)] 97.7 °F (36.5 °C)  Pulse:  [] 83  Resp:  " [10-34] 18  SpO2:  [86 %-100 %] 98 %  BP: (102-149)/(55-95) 146/72     Weight: 67.6 kg (149 lb)  Height: 5' 6" (167.6 cm)  Body mass index is 24.05 kg/m².      Intake/Output Summary (Last 24 hours) at 1/20/2024 0533  Last data filed at 1/19/2024 1906  Gross per 24 hour   Intake 1850 ml   Output 780 ml   Net 1070 ml        Ortho/SPM Exam  AAOx4  NAD  Reg rate  No increased WOB    LLE:  Dressing c/d/i  SILT T/SP/DP/Vargas/Sa  Motor intact T/SP/DP  WWP extremities  FCDs in place and functioning.      Significant Labs: All pertinent labs within the past 24 hours have been reviewed.    Significant Imaging: I have reviewed all pertinent imaging results/findings.  "

## 2024-01-20 NOTE — PROGRESS NOTES
"[facility]  Progress Note    Patient Name: Froilan Ray  MRN: [id number]  Patient Class: IP- Inpatient   Admission Date: 1/17/2024  Length of Stay: 2 days  Attending Physician: Dianne Vila MD  Primary Care Provider: Alphonse Boothe III, MD        Subjective:     Principal Problem:Staphylococcal arthritis of left hip        HPI:  Froilan Ray is a 78 y.o. female with history of L hip surgery, HTN, HLD, CHF, chronic pain on opioids, peripheral neuropathy who was transferred from [facility] for concerns for septic joint.     Patient is a poor historian regarding timing and characterization of her symptoms. She reports that for "a long time" she has been "unable to do anything," basically lying in bed most of the day as she is unable to bear weight on her L leg due to hip pain. She reports chronic hip pain for which she takes "2 Percocets every 6 hours," however this has not relieved her pain for at least the past 2-3 weeks. She denies any fever, n/v, diarrhea, or other systemic symptoms except dry mouth.     At the OSH, she met sepsis criteria with leukocytosis and tachycardia. CT showed fluid around her L hip previous surgical site, concerning for septic arthritis. Was in AFib with RVR, and converted to sinus rhythm with cardizem and amiodarone infusions. She was given antibiotics and transferred here for Ortho evaluation.     Overview/Hospital Course:  Patient admitted for septic hip. S/p aspiration with purulent fluid removal, culture +Staph. Bcx+ MRSA. Started on vanc/zosyn- deescalated to vanc. Ortho consulted and following. S/p hip washout and debridement with Left hip proximal femoral resection with placement of antibiotic cement spacer, Removal of cephalomedullary nail left femur, Incision and drainage with irrigation deep abscess left buttock and thigh, Excisional debridement of fascia and muscle left hip. Drain placed.   Noted painful lesions on tongue after " starting bactrim considering SJS vs HSV as lesions appear vesicular. Viscous lidocaine for mouth pain. ID consulted for abx recs, cont vanc. Repeat Bcx NGTD.    PT/OT consulted.     Interval History: s/p left hip washout and debridement with ortho, drain and antibiotic spacer placed. Culture+ Staph, Bcx + MRSA, repeat Bcx NGTD. On Vanc.   Patient sleeping post procedure and family updated at bedside.     PT/OT post op ordered.     Review of Systems   Constitutional:  Negative for fever.   HENT:  Positive for mouth sores and trouble swallowing.    Respiratory:  Negative for shortness of breath.    Cardiovascular:  Negative for chest pain.   Gastrointestinal:  Negative for abdominal pain.   Genitourinary:  Negative for dysuria.   Musculoskeletal:  Positive for gait problem.        Left hip pain   Psychiatric/Behavioral:  Negative for confusion.      Objective:     Vital Signs (Most Recent):  Temp: 98.5 °F (36.9 °C) (01/19/24 1748)  Pulse: 97 (01/19/24 1748)  Resp: 18 (01/19/24 1715)  BP: 120/69 (01/19/24 1748)  SpO2: 96 % (01/19/24 1748) Vital Signs (24h Range):  Temp:  [97 °F (36.1 °C)-98.7 °F (37.1 °C)] 98.5 °F (36.9 °C)  Pulse:  [66-97] 97  Resp:  [10-34] 18  SpO2:  [86 %-100 %] 96 %  BP: (102-149)/(55-95) 120/69     Weight: 67.6 kg (149 lb)  Body mass index is 24.05 kg/m².    Intake/Output Summary (Last 24 hours) at 1/19/2024 1901  Last data filed at 1/19/2024 1858  Gross per 24 hour   Intake 1850 ml   Output 930 ml   Net 920 ml           Physical Exam  Vitals and nursing note reviewed.   Constitutional:       General: She is not in acute distress.     Appearance: She is ill-appearing.      Comments: sleeping   HENT:      Head: Normocephalic and atraumatic.      Nose: Nose normal.      Mouth/Throat:      Mouth: Mucous membranes are moist.      Comments: Gray vesicular lesions on tongue, and angular cheilitis noted. no white plaques inside mouth  Eyes:      Extraocular Movements: Extraocular movements intact.       Conjunctiva/sclera: Conjunctivae normal.      Pupils: Pupils are equal, round, and reactive to light.   Cardiovascular:      Rate and Rhythm: Normal rate and regular rhythm.      Pulses: Normal pulses.      Heart sounds: Normal heart sounds.   Pulmonary:      Effort: Pulmonary effort is normal.      Breath sounds: Normal breath sounds.   Abdominal:      General: Abdomen is flat. Bowel sounds are normal. There is no distension.      Palpations: Abdomen is soft.      Tenderness: There is no abdominal tenderness.   Musculoskeletal:      Cervical back: Normal range of motion and neck supple.      Left lower leg: Edema present.      Comments: S/p hip washout with dressing and drain in place, sanguinous output   Skin:     General: Skin is warm and dry.   Neurological:      General: No focal deficit present.      Mental Status: She is alert.   Psychiatric:         Mood and Affect: Mood normal.         Behavior: Behavior normal.             Significant Labs: All pertinent labs within the past 24 hours have been reviewed.    Significant Imaging: I have reviewed all pertinent imaging results/findings within the past 24 hours.    Assessment/Plan:      * Staphylococcal arthritis of left hip  - see sepsis due to MRSA      Oral lesion  - painful gray vesicular lesions noted on tongue  - previously thought to be thrush and patient started on nystatin  - does not appear to be thrush on my exam given appearance  - started few days after starting bactrim  - concern for SJS given proximity to bactrim use versus HSV lesions, PCR lesion pending  - viscous lidocaine ordered       MRSA bacteremia  - Bcx+ MRSA, source likely left hip  - repeat Bcx NGTD  - no vegetation noted on ECHO  - cont vanc  - ID consulted for abx recs- cont vanc      Painful orthopaedic hardware  - septic hip joint with hardware in place  - ortho following   - see sepsis 2/2 MRSA      (HFpEF) heart failure with preserved ejection fraction  Monitor volume status.  Control HTN.   - TTE: Left Ventricle: The left ventricle is normal in size. Normal wall thickness. There is concentric remodeling. Normal wall motion. There is low normal systolic function with a visually estimated ejection fraction of 50 - 55%. There is normal diastolic function.    Right Ventricle: Normal right ventricular cavity size. Wall thickness is normal. Right ventricle wall motion  is normal. Systolic function is normal.    Aortic Valve: There is moderate aortic valve sclerosis. There is moderate annular calcification present.    Mitral Valve: There is severe mitral annular calcification present.    Aorta: Aortic root is normal in size measuring 3.37 cm. Ascending aorta is normal measuring 3.51 cm.    Pulmonary Artery: The estimated pulmonary artery systolic pressure is 25 mmHg.    IVC/SVC: Normal venous pressure at 3 mmHg.    Depression  Continue home Lexapro.        Atrial fibrillation with RVR  Patient has a history of atrial fibrillation and initially went into RVR at outside hospital.  Converted back to sinus rhythm with CCB and amiodarone infusions.  Cardiology was consulted and recommended continuing home amiodarone, metoprolol, and resuming home Eliquis (as patient was noncompliant).  We will monitor on telemetry       Sepsis due to methicillin resistant Staphylococcus aureus (MRSA)  Initially presented to outside hospital with complaints of left hip pain and inability to ambulate, and met sepsis criteria with tachycardia (AFib with RVR) and leukocytosis.  Source is likely left hip septic arthritis.    Blood cultures obtained there, and lactate only mildly elevated which resolved with fluids.    We will continue vancomycin and Zosyn for presumed septic arthritis and consult Orthopedics for further evaluation.  - S/P left hip aspiration with purulent drainage- GS+ GPC, culture + Staph   - Bcx+ MRSA, repeat NGTD  - ECHO without vegetation  - ID consulted for antibiotic recs- recommend cont vanc  -  ortho following. S/p hip washout and debridement. Left hip proximal femoral resection with placement of antibiotic cement spacer, Removal of cephalomedullary nail left femur, Incision and drainage with irrigation deep abscess left buttock and thigh, Excisional debridement of fascia and muscle left hip. Drain placed.     Iron deficiency anemia  Chronic, and stable around baseline. Monitor.     Peripheral neuropathy  Continue home Lyrica.    Essential hypertension  Continue hospital formulary of home antihypertensives.     Chronic pain with uncomplicated opioid dependence  PDMP reviewed. Will continue pain control for septic arthritis.         VTE Risk Mitigation (From admission, onward)           Ordered     apixaban tablet 5 mg  2 times daily         01/19/24 1446     Reason for No Pharmacological VTE Prophylaxis  Once        Question:  Reasons:  Answer:  Already adequately anticoagulated on oral Anticoagulants    01/17/24 2037     IP VTE HIGH RISK PATIENT  Once         01/17/24 2037     Place sequential compression device  Until discontinued         01/17/24 2037                    Discharge Planning   BAILEY: 1/23/2024     Code Status: Full Code   Is the patient medically ready for discharge?:     Reason for patient still in hospital (select all that apply): Patient trending condition, Consult recommendations, and PT / OT recommendations  Discharge Plan A: Home with family, Home Health                  Dianne Vila MD  Department of Hospital Medicine   [facility] - Tele[facility]

## 2024-01-20 NOTE — SUBJECTIVE & OBJECTIVE
Interval History: Notes reviewed. 79 yo woman with MRSA abscess and h/o nail fixer, femur, s/p debridement with implantation of cef/vanc/tobra, now on vancomycin. Feeling okay. Lives in [place].    Review of Systems   Constitutional:  Negative for chills and fever.   Skin:  Positive for wound.   All other systems reviewed and are negative.    Objective:     Vital Signs (Most Recent):  Temp: 98.6 °F (37 °C) (01/20/24 1132)  Pulse: 77 (01/20/24 1132)  Resp: 18 (01/20/24 1154)  BP: 122/64 (01/20/24 1132)  SpO2: 99 % (01/20/24 1132) Vital Signs (24h Range):  Temp:  [97 °F (36.1 °C)-98.6 °F (37 °C)] 98.6 °F (37 °C)  Pulse:  [] 77  Resp:  [10-34] 18  SpO2:  [86 %-100 %] 99 %  BP: (102-148)/(55-81) 122/64     Weight: 67.6 kg (149 lb)  Body mass index is 24.05 kg/m².    Estimated Creatinine Clearance: 56 mL/min (based on SCr of 0.8 mg/dL).     Physical Exam  Vitals and nursing note reviewed.   HENT:      Head: Normocephalic and atraumatic.   Eyes:      Pupils: Pupils are equal, round, and reactive to light.   Musculoskeletal:      Comments: Left thigh bandaged, BEULAH in place   Neurological:      General: No focal deficit present.      Mental Status: She is alert and oriented to person, place, and time.   Psychiatric:         Mood and Affect: Mood normal.         Behavior: Behavior normal.         Thought Content: Thought content normal.         Judgment: Judgment normal.          Significant Labs: Blood Culture:   Recent Labs   Lab 01/16/24  1413 01/18/24  1655 01/18/24  1704   LABBLOO No Growth to date  No Growth to date  No Growth to date  No Growth to date  Gram stain aer bottle:  Gram positive cocci  Results called to and read back by:  Romaine Doan RN ER 1/17/24 @ 1314 JLR  METHICILLIN RESISTANT STAPHYLOCOCCUS AUREUS  Known MRSA patient  * No Growth to date  No Growth to date No Growth to date  No Growth to date     BMP:   Recent Labs   Lab 01/20/24  0821      *   K 3.1*      CO2 24    BUN 15   CREATININE 0.8   CALCIUM 9.0     CBC:   Recent Labs   Lab 01/19/24  0443 01/20/24  0821   WBC 18.65* 20.94*   HGB 8.1* 9.3*   HCT 26.7* 27.6*   * 434     Wound Culture:   Recent Labs   Lab 01/18/24  1006 01/18/24  1013   LABAERO METHICILLIN RESISTANT STAPHYLOCOCCUS AUREUS  Many  * METHICILLIN RESISTANT STAPHYLOCOCCUS AUREUS  Many  For susceptibility see order #[id number]  *       Significant Imaging: I have reviewed all pertinent imaging results/findings within the past 24 hours.

## 2024-01-20 NOTE — SUBJECTIVE & OBJECTIVE
Interval History: s/p left hip washout and debridement with ortho, drain and antibiotic spacer placed. Culture+ Staph, Bcx + MRSA, repeat Bcx NGTD. On Vanc.   Patient sleeping post procedure and family updated at bedside.     PT/OT post op ordered.     Review of Systems   Constitutional:  Negative for fever.   HENT:  Positive for mouth sores and trouble swallowing.    Respiratory:  Negative for shortness of breath.    Cardiovascular:  Negative for chest pain.   Gastrointestinal:  Negative for abdominal pain.   Genitourinary:  Negative for dysuria.   Musculoskeletal:  Positive for gait problem.        Left hip pain   Psychiatric/Behavioral:  Negative for confusion.      Objective:     Vital Signs (Most Recent):  Temp: 98.5 °F (36.9 °C) (01/19/24 1748)  Pulse: 97 (01/19/24 1748)  Resp: 18 (01/19/24 1715)  BP: 120/69 (01/19/24 1748)  SpO2: 96 % (01/19/24 1748) Vital Signs (24h Range):  Temp:  [97 °F (36.1 °C)-98.7 °F (37.1 °C)] 98.5 °F (36.9 °C)  Pulse:  [66-97] 97  Resp:  [10-34] 18  SpO2:  [86 %-100 %] 96 %  BP: (102-149)/(55-95) 120/69     Weight: 67.6 kg (149 lb)  Body mass index is 24.05 kg/m².    Intake/Output Summary (Last 24 hours) at 1/19/2024 1901  Last data filed at 1/19/2024 1858  Gross per 24 hour   Intake 1850 ml   Output 930 ml   Net 920 ml           Physical Exam  Vitals and nursing note reviewed.   Constitutional:       General: She is not in acute distress.     Appearance: She is ill-appearing.      Comments: sleeping   HENT:      Head: Normocephalic and atraumatic.      Nose: Nose normal.      Mouth/Throat:      Mouth: Mucous membranes are moist.      Comments: Gray vesicular lesions on tongue, and angular cheilitis noted. no white plaques inside mouth  Eyes:      Extraocular Movements: Extraocular movements intact.      Conjunctiva/sclera: Conjunctivae normal.      Pupils: Pupils are equal, round, and reactive to light.   Cardiovascular:      Rate and Rhythm: Normal rate and regular rhythm.       Pulses: Normal pulses.      Heart sounds: Normal heart sounds.   Pulmonary:      Effort: Pulmonary effort is normal.      Breath sounds: Normal breath sounds.   Abdominal:      General: Abdomen is flat. Bowel sounds are normal. There is no distension.      Palpations: Abdomen is soft.      Tenderness: There is no abdominal tenderness.   Musculoskeletal:      Cervical back: Normal range of motion and neck supple.      Left lower leg: Edema present.      Comments: S/p hip washout with dressing and drain in place, sanguinous output   Skin:     General: Skin is warm and dry.   Neurological:      General: No focal deficit present.      Mental Status: She is alert.   Psychiatric:         Mood and Affect: Mood normal.         Behavior: Behavior normal.             Significant Labs: All pertinent labs within the past 24 hours have been reviewed.    Significant Imaging: I have reviewed all pertinent imaging results/findings within the past 24 hours.

## 2024-01-20 NOTE — ASSESSMENT & PLAN NOTE
"78F with h/o HTN, CHF admitted 1/17 as transfer from [facility] for concern for septic L hip joint. Taken to OR today 1/19 for: Left hip proximal femoral resection with placement of antibiotic cement spacer, Removal of cephalomedullary nail left femur, Incision and drainage with irrigation deep abscess left buttock and thigh, Excisional debridement of fascia and muscle left hip. Bcx 1/16 with MRSA; repeat drawn 1/18 NGTD. 2d echo- adequate study, no veg. Currently on vanc/zosy. HSV pcr sent of lip blister- pending. ID consulted for "painful tongue lesions, HSV?, painful oral blisters after starting bactrim, SJS?, antibiotic recs for septic joint currently on Vanc     Likely buttock/gluteal abscess, necessitating to femoral head    Recommendations:   - continue vancomycin, pharmacy dosing   - f/u repeat bcx to document clearance; repeat if positive  - f/u HSV pcr  - anticipating 6 weeks IV abx with possible po abx suppression afterwards  - check susceptibility of daptomycin  "

## 2024-01-20 NOTE — PROGRESS NOTES
[facility] Tele[facility]  Orthopedics  Progress Note    Patient Name: Froilan Ray  MRN: [id number]  Admission Date: 1/17/2024  Hospital Length of Stay: 3 days  Attending Provider: Dianne Vila MD  Primary Care Provider: Alphonse Boothe III, MD  Follow-up For: Procedure(s) (LRB):  ARTHROPLASTY, HIP, GIRDLESTONE, POSTERIOR APPROACH (Left)  IRRIGATION AND DEBRIDEMENT, LOWER EXTREMITY (Left)    Post-Operative Day: 1 Day Post-Op  Subjective:     Principal Problem:Staphylococcal arthritis of left hip    Principal Orthopedic Problem: s/p nail removal and abx spacer 1/19    Interval History: POD1.  Afebrile, VSS, NAEON.  Pain has been reportedly well controlled.  Drain with 130 cc's postoperatively.  Intraoperative cultures positive for staph aureus; continuing vancomycin per ID.  Blood cultures no growth to date.         Review of patient's allergies indicates:  No Known Allergies    Current Facility-Administered Medications   Medication    (Magic mouthwash) 1:1:1 diphenhydrAMINE(Benadryl) 12.5mg/5ml liq, aluminum & magnesium hydroxide-simethicone (Maalox), LIDOcaine viscous 2%    acetaminophen tablet 1,000 mg    aluminum-magnesium hydroxide-simethicone 200-200-20 mg/5 mL suspension 30 mL    amiodarone tablet 200 mg    amLODIPine tablet 5 mg    apixaban tablet 5 mg    docusate sodium capsule 100 mg    EScitalopram oxalate tablet 20 mg    guaiFENesin 100 mg/5 ml syrup 200 mg    oxyCODONE immediate release tablet 5 mg    And    oxyCODONE immediate release tablet Tab 10 mg    And    HYDROmorphone injection 0.5 mg    LIDOcaine viscous HCl 2% oral solution 5 mL    melatonin tablet 6 mg    methocarbamoL tablet 500 mg    metoprolol tartrate (LOPRESSOR) tablet 50 mg    mupirocin 2 % ointment    naloxone 0.4 mg/mL injection 0.02 mg    nystatin 100,000 unit/mL suspension 500,000 Units    ondansetron disintegrating tablet 8 mg    polyethylene glycol packet 17 g    pregabalin capsule 200 mg    simethicone chewable  "tablet 80 mg    sodium chloride 0.9% flush 1-10 mL    vancomycin - pharmacy to dose    vancomycin 750 mg in dextrose 5 % (D5W) 250 mL IVPB (Vial-Mate)     Objective:     Vital Signs (Most Recent):  Temp: 97.7 °F (36.5 °C) (01/20/24 0456)  Pulse: 83 (01/20/24 0456)  Resp: 18 (01/20/24 0330)  BP: (!) 146/72 (01/20/24 0456)  SpO2: 98 % (01/20/24 0456) Vital Signs (24h Range):  Temp:  [97 °F (36.1 °C)-98.7 °F (37.1 °C)] 97.7 °F (36.5 °C)  Pulse:  [] 83  Resp:  [10-34] 18  SpO2:  [86 %-100 %] 98 %  BP: (102-149)/(55-95) 146/72     Weight: 67.6 kg (149 lb)  Height: 5' 6" (167.6 cm)  Body mass index is 24.05 kg/m².      Intake/Output Summary (Last 24 hours) at 1/20/2024 0533  Last data filed at 1/19/2024 1906  Gross per 24 hour   Intake 1850 ml   Output 780 ml   Net 1070 ml        Ortho/SPM Exam  AAOx4  NAD  Reg rate  No increased WOB    LLE:  Dressing c/d/i  SILT T/SP/DP/Vargas/Sa  Motor intact T/SP/DP  WWP extremities  FCDs in place and functioning.      Significant Labs: All pertinent labs within the past 24 hours have been reviewed.    Significant Imaging: I have reviewed all pertinent imaging results/findings.  Assessment/Plan:     * Staphylococcal arthritis of left hip  Froilan Ray is a 78 y.o. female w/PMH of HTN, HLD, CHF, chronic pain on opioids, peripheral neuropathy, L-intertroch fx s/p TFNA (12/11/2018, Dr. Eulalio De La Cruz), who presents as transfer from OSH with large left hip fluid collection and XR showing femoral head collapse with cut-through of the TFNA helical blade. Blood cx + for MRSA, on vanc/ceftaroline, ID consulted. Pt underwent IR aspiration of L hip on 1/18 with 72cc of juan pus aspirated, gram stain + for gram positive cocci in clusters.    » s/p nail removal and abx spacer 1/19    Pain control: MM  PT/OT:  TTWB LLE  DVT PPx: eliquis 5 bid for a fib, SCDs at all times when not ambulating  Abx:  Per ID; currently receiving vancomycin  Repeat blood cultures NGTD    » Dispo: f/u final ID " recs; PICC line placement Sunday if blood cultures negative  .    OTF Bustos MD  Orthopedics  [facility] - Tele[facility]

## 2024-01-20 NOTE — ASSESSMENT & PLAN NOTE
- Bcx+ MRSA, source likely left hip  - repeat Bcx NGTD  - no vegetation noted on ECHO  - cont vanc  - ID consulted for abx recs- cont vanc

## 2024-01-20 NOTE — ASSESSMENT & PLAN NOTE
Monitor volume status. Control HTN.   - TTE: Left Ventricle: The left ventricle is normal in size. Normal wall thickness. There is concentric remodeling. Normal wall motion. There is low normal systolic function with a visually estimated ejection fraction of 50 - 55%. There is normal diastolic function.    Right Ventricle: Normal right ventricular cavity size. Wall thickness is normal. Right ventricle wall motion  is normal. Systolic function is normal.    Aortic Valve: There is moderate aortic valve sclerosis. There is moderate annular calcification present.    Mitral Valve: There is severe mitral annular calcification present.    Aorta: Aortic root is normal in size measuring 3.37 cm. Ascending aorta is normal measuring 3.51 cm.    Pulmonary Artery: The estimated pulmonary artery systolic pressure is 25 mmHg.    IVC/SVC: Normal venous pressure at 3 mmHg.

## 2024-01-20 NOTE — ASSESSMENT & PLAN NOTE
Initially presented to outside hospital with complaints of left hip pain and inability to ambulate, and met sepsis criteria with tachycardia (AFib with RVR) and leukocytosis.  Source is likely left hip septic arthritis.    Blood cultures obtained there, and lactate only mildly elevated which resolved with fluids.    We will continue vancomycin and Zosyn for presumed septic arthritis and consult Orthopedics for further evaluation.  - S/P left hip aspiration with purulent drainage- GS+ GPC, culture + MRSA  - Bcx+ MRSA, repeat NGTD  - ECHO without vegetation  - ID consulted for antibiotic recs- recommend cont vanc, check dapto susceptibility   - ortho following. S/p hip washout and debridement. Left hip proximal femoral resection with placement of antibiotic cement spacer, Removal of cephalomedullary nail left femur, Incision and drainage with irrigation deep abscess left buttock and thigh, Excisional debridement of fascia and muscle left hip. Drain placed.

## 2024-01-20 NOTE — ASSESSMENT & PLAN NOTE
Froilan Ray is a 78 y.o. female w/PMH of HTN, HLD, CHF, chronic pain on opioids, peripheral neuropathy, L-intertroch fx s/p TFNA (12/11/2018, Dr. Eulalio De La Cruz), who presents as transfer from OSH with large left hip fluid collection and XR showing femoral head collapse with cut-through of the TFNA helical blade. Blood cx + for MRSA, on vanc/ceftaroline, ID consulted. Pt underwent IR aspiration of L hip on 1/18 with 72cc of juan pus aspirated, gram stain + for gram positive cocci in clusters.    » s/p nail removal and abx spacer 1/19    Pain control: MM  PT/OT:  TTWB LLE  DVT PPx: eliquis 5 bid for a fib, SCDs at all times when not ambulating  Abx:  Per ID; currently receiving vancomycin  Repeat blood cultures NGTD    » Dispo: f/u final ID recs; PICC line placement Sunday if blood cultures negative

## 2024-01-20 NOTE — SUBJECTIVE & OBJECTIVE
Interval History: Patient reporting mild improvement in mouth pain. No other concerns.    Repeat Bcx NGTD  On vanc for MRSA       Review of Systems   Constitutional:  Negative for fever.   HENT:  Positive for mouth sores and trouble swallowing.    Respiratory:  Negative for shortness of breath.    Cardiovascular:  Negative for chest pain.   Gastrointestinal:  Negative for abdominal pain.   Genitourinary:  Negative for dysuria.   Musculoskeletal:  Positive for gait problem.        Left hip pain   Psychiatric/Behavioral:  Negative for confusion.      Objective:     Vital Signs (Most Recent):  Temp: 98.3 °F (36.8 °C) (01/20/24 1549)  Pulse: 76 (01/20/24 1549)  Resp: 20 (01/20/24 1549)  BP: 115/81 (01/20/24 1549)  SpO2: 99 % (01/20/24 1549) Vital Signs (24h Range):  Temp:  [97.3 °F (36.3 °C)-98.6 °F (37 °C)] 98.3 °F (36.8 °C)  Pulse:  [] 76  Resp:  [16-20] 20  SpO2:  [95 %-100 %] 99 %  BP: (115-146)/(64-81) 115/81     Weight: 67.6 kg (149 lb)  Body mass index is 24.05 kg/m².    Intake/Output Summary (Last 24 hours) at 1/20/2024 1628  Last data filed at 1/20/2024 1627  Gross per 24 hour   Intake 1130 ml   Output 505 ml   Net 625 ml           Physical Exam  Vitals and nursing note reviewed.   Constitutional:       General: She is not in acute distress.     Appearance: She is ill-appearing.   HENT:      Head: Normocephalic and atraumatic.      Nose: Nose normal.      Mouth/Throat:      Mouth: Mucous membranes are moist.      Comments: Gray vesicular lesions on tongue, and angular cheilitis noted. no white plaques inside mouth  Eyes:      Extraocular Movements: Extraocular movements intact.      Conjunctiva/sclera: Conjunctivae normal.      Pupils: Pupils are equal, round, and reactive to light.   Cardiovascular:      Rate and Rhythm: Normal rate and regular rhythm.      Pulses: Normal pulses.      Heart sounds: Normal heart sounds.   Pulmonary:      Effort: Pulmonary effort is normal.      Breath sounds: Normal  breath sounds.   Abdominal:      General: Abdomen is flat. Bowel sounds are normal. There is no distension.      Palpations: Abdomen is soft.      Tenderness: There is no abdominal tenderness.   Musculoskeletal:      Cervical back: Normal range of motion and neck supple.      Left lower leg: Edema present.      Comments: S/p hip washout with dressing and drain in place, sanguinous output   Skin:     General: Skin is warm and dry.   Neurological:      General: No focal deficit present.      Mental Status: She is alert.   Psychiatric:         Mood and Affect: Mood normal.         Behavior: Behavior normal.             Significant Labs: All pertinent labs within the past 24 hours have been reviewed.    Significant Imaging: I have reviewed all pertinent imaging results/findings within the past 24 hours.

## 2024-01-20 NOTE — PLAN OF CARE
Problem: Occupational Therapy  Goal: Occupational Therapy Goal  Description: Goals to be met by:  2/20/2024    Patient will increase functional independence with ADLs by performing:    UE Dressing with Set-up Assistance.  LE Dressing with Minimal Assistance using AE as needed.  Grooming while seated with Modified [place].  Toileting from bedside commode with Maximum Assistance for hygiene and clothing management.   Supine to sit with Minimal Assistance.  Stand pivot transfers with Minimal Assistance.  Toilet transfer to bedside commode with Minimal Assistance.  Upper extremity exercise program 2x10 reps, with supervision.    Outcome: Ongoing, Progressing   Patient's goals are set.

## 2024-01-20 NOTE — PT/OT/SLP EVAL
Occupational Therapy   Evaluation/Treatment    Name: Froilan Ray  MRN: [id number]  Admitting Diagnosis: Staphylococcal arthritis of left hip  Recent Surgery: Procedure(s) (LRB):  ARTHROPLASTY, HIP, GIRDLESTONE, POSTERIOR APPROACH (Left)  IRRIGATION AND DEBRIDEMENT, LOWER EXTREMITY (Left) 1 Day Post-Op    Recommendations:     Discharge Recommendations: Moderate Intensity Therapy  Discharge Equipment Recommendations:  none  Barriers to discharge:   (increased level of assistance needed at this time)    Assessment:     Froilan Ray is a 78 y.o. female with a medical diagnosis of Staphylococcal arthritis of left hip. Performance deficits affecting function: weakness, impaired endurance, impaired self care skills, impaired functional mobility, gait instability, impaired balance. Patient very limited by pain throughout session. Nursing student present during session throughout with instructor. Patient would benefit from continued skilled acute OT 4x/wk to improve functional mobility, increase independence with ADLs, and address established goals. Recommending moderate intensity therapy once medically appropriate for discharge to increase maximal independence, reduce burden of care, and ensure safety.     Rehab Prognosis: Good; patient would benefit from acute skilled OT services to address these deficits and reach maximum level of function.       Plan:     Patient to be seen 4 x/week to address the above listed problems via self-care/home management, therapeutic activities, therapeutic exercises  Plan of Care Expires: 02/20/24  Plan of Care Reviewed with: patient    Subjective     Chief Complaint: pain  Patient/Family Comments/goals: Patient agreed to therapy    Occupational Profile:  Living Environment: Patient lives with  and son in a [facility] with ramp access. Patient has a walk in shower with shower chair and grab bar. Patient has a regular toilet with no grab bar. Patient needed assistance with ADLs.  Patient used a RW for ambulation. Patient used a w/c in the community.   Equipment Used at Home: walker, rolling, rollator, wheelchair, bedside commode, cane, straight, shower chair, grab bar    Pain/Comfort:  Pain Rating 1: 8/10  Location - Side 1: Left  Location - Orientation 1: generalized  Location 1: hip  Pain Addressed 1: Reposition, Distraction, Cessation of Activity, Pre-medicate for activity  Pain Rating Post-Intervention 1: 9/10    Patients cultural, spiritual, [employer] conflicts given the current situation: no    Objective:     Communicated with: TIM prior to session.  Patient found HOB elevated with BEULAH drain, wound vac, peripheral IV, barrera catheter, telemetry upon OT entry to room.    General Precautions: Standard, fall  Orthopedic Precautions: LLE toe touch weight bearing  Braces: N/A  Respiratory Status: Room air    Occupational Performance:    Bed Mobility:    Patient completed Scooting/Bridging with maximal assistance anteriorly to EOB sitting EOB; total assistance to HOB lying supine with 2 persons via drawsheet  Patient completed Supine to Sit with maximal assistance with HOB elevated  Patient completed Sit to Supine with maximal assistance with HOB flat    Functional Mobility/Transfers:  Did not perform    Activities of Daily Living:  Grooming: attempted to have patient perform oral care sitting EOB, but patient adamantly refused to participate when this therapist brought items to her     Lower Body Dressing: total assistance Donning socks    Cognitive/Visual Perceptual:  Cognitive/Psychosocial Skills:     -       Oriented to: Person, Place, Time, and Situation   -       Follows Commands/attention:Follows multistep  commands  -       Communication: clear/fluent  -       Memory: No Deficits noted  -       Safety awareness/insight to disability: intact   -       Mood/Affect/Coping skills/emotional control: Appropriate to situation  Visual/Perceptual:      -Intact      Physical Exam:  Upper  Extremity Range of Motion:     -       Right Upper Extremity: limited shoulder flexion  -       Left Upper Extremity: limited shoulder flexion   Strength:    -       Right Upper Extremity: WFL  -       Left Upper Extremity:  WFL  Fine Motor Coordination:    -       Intact      AMPAC 6 Click ADL:  AMPAC Total Score: 11    Treatment & Education:  Role of OT and POC  ADL retraining  Functional mobility training  Safety  Discharge planning  Importance EOB/OOB activityearache    Patient left HOB elevated with all lines intact, call button in reach, nursing student and nursing instructor present, and all needs met.     SCDs or FCDs are not present in room, but mentioned them to nursing during session and they reported they would look into it.     GOALS:   Multidisciplinary Problems       Occupational Therapy Goals          Problem: Occupational Therapy    Goal Priority Disciplines Outcome Interventions   Occupational Therapy Goal     OT, PT/OT Ongoing, Progressing    Description: Goals to be met by:  2/20/2024    Patient will increase functional independence with ADLs by performing:    UE Dressing with Set-up Assistance.  LE Dressing with Minimal Assistance using AE as needed.  Grooming while seated with Modified [place].  Toileting from bedside commode with Maximum Assistance for hygiene and clothing management.   Supine to sit with Minimal Assistance.  Stand pivot transfers with Minimal Assistance.  Toilet transfer to bedside commode with Minimal Assistance.  Upper extremity exercise program 2x10 reps, with supervision.                         History:     Past Medical History:   Diagnosis Date    Anemia     Arthritis     Bleeding ulcer 07/2016    Chronic pain     DDD (degenerative disc disease), cervical     DDD (degenerative disc disease), lumbar     Depression     Disc degeneration, lumbosacral     Diverticulitis     Encounter for blood transfusion     Hypertension     IBS (irritable bowel syndrome)      Neuropathy of both feet     Seizures     none  since 2017    Umbilical hernia 2020         Past Surgical History:   Procedure Laterality Date    BACK SURGERY      BREAST BIOPSY      BREAST SURGERY Right     lumpectomy    CAUDAL EPIDURAL STEROID INJECTION N/A 01/28/2022    Procedure: Injection-steroid-epidural-caudal;  Surgeon: Luzmaria Marcelino MD;  Location: [facility] OR;  Service: Pain Management;  Laterality: N/A;    COLONOSCOPY N/A 01/14/2022    Procedure: COLONOSCOPY;  Surgeon: Abby Landers MD;  Location: [facility] ENDO;  Service: Endoscopy;  Laterality: N/A;    ENDOSCOPIC ULTRASOUND OF UPPER GASTROINTESTINAL TRACT N/A 07/21/2020    Procedure: ULTRASOUND, UPPER GI TRACT, ENDOSCOPIC;  Surgeon: Marcio Nguyễn III, MD;  Location: [facility] ENDO;  Service: Endoscopy;  Laterality: N/A;    ESOPHAGOGASTRODUODENOSCOPY N/A 01/14/2022    Procedure: EGD (ESOPHAGOGASTRODUODENOSCOPY);  Surgeon: Abby Landers MD;  Location: [facility] ENDO;  Service: Endoscopy;  Laterality: N/A;    ESOPHAGOGASTRODUODENOSCOPY N/A 06/10/2022    Procedure: EGD (ESOPHAGOGASTRODUODENOSCOPY);  Surgeon: Abby Landers MD;  Location: [facility] ENDO;  Service: Endoscopy;  Laterality: N/A;    EYE SURGERY      cataract    HYSTERECTOMY      INTRAMEDULLARY RODDING OF TROCHANTER OF FEMUR Left 12/11/2018    Procedure: INSERTION, INTRAMEDULLARY SANTOS, FEMUR, TROCHANTER;  Surgeon: Eulalio De La Cruz MD;  Location: [facility] OR;  Service: Orthopedics;  Laterality: Left;    REPAIR OF EPIGASTRIC HERNIA N/A 12/7/2023    Procedure: REPAIR, HERNIA, EPIGASTRIC;  Surgeon: Vipul Escobar MD;  Location: [facility] OR;  Service: General;  Laterality: N/A;    SPINAL CORD STIMULATOR IMPLANT  09/18/2013    and removal    SPINE SURGERY  2006    lumbar L2-S1 decompression.    SPINE SURGERY      cervical decompression    TONSILLECTOMY         Time Tracking:     OT Date of Treatment: 01/20/24  OT Start Time: 1141  OT Stop Time: 1212  OT Total Time (min): 31 min    Billable Minutes:Evaluation  8  Therapeutic Activity 23 1/20/2024

## 2024-01-20 NOTE — PROGRESS NOTES
"[facility] - Telemetry [facility] Medicine  Progress Note    Patient Name: Froialn Ray  MRN: [id number]  Patient Class: IP- Inpatient   Admission Date: 1/17/2024  Length of Stay: 3 days  Attending Physician: Dianne Vila MD  Primary Care Provider: Alphonse Boothe III, MD        Subjective:     Principal Problem:Staphylococcal arthritis of left hip        HPI:  Froilan Ray is a 78 y.o. female with history of L hip surgery, HTN, HLD, CHF, chronic pain on opioids, peripheral neuropathy who was transferred from [facility] for concerns for septic joint.     Patient is a poor historian regarding timing and characterization of her symptoms. She reports that for "a long time" she has been "unable to do anything," basically lying in bed most of the day as she is unable to bear weight on her L leg due to hip pain. She reports chronic hip pain for which she takes "2 Percocets every 6 hours," however this has not relieved her pain for at least the past 2-3 weeks. She denies any fever, n/v, diarrhea, or other systemic symptoms except dry mouth.     At the OSH, she met sepsis criteria with leukocytosis and tachycardia. CT showed fluid around her L hip previous surgical site, concerning for septic arthritis. Was in AFib with RVR, and converted to sinus rhythm with cardizem and amiodarone infusions. She was given antibiotics and transferred here for Ortho evaluation.     Overview/Hospital Course:  Patient admitted for septic hip. S/p aspiration with purulent fluid removal, culture +Staph. Bcx+ MRSA. Started on vanc/zosyn- deescalated to vanc. Ortho consulted and following. S/p hip washout and debridement with Left hip proximal femoral resection with placement of antibiotic cement spacer, Removal of cephalomedullary nail left femur, Incision and drainage with irrigation deep abscess left buttock and thigh, Excisional debridement of fascia and muscle left hip. Drain placed.   Noted painful lesions on tongue after " starting bactrim considering SJS vs HSV as lesions appear vesicular. Viscous lidocaine for mouth pain. Started empiric valtrex. HSV PCR pending  ID consulted for abx recs, cont vanc. Repeat Bcx NGTD.    PT/OT consulted.     Interval History: Patient reporting mild improvement in mouth pain. No other concerns.    Repeat Bcx NGTD  On vanc for MRSA       Review of Systems   Constitutional:  Negative for fever.   HENT:  Positive for mouth sores and trouble swallowing.    Respiratory:  Negative for shortness of breath.    Cardiovascular:  Negative for chest pain.   Gastrointestinal:  Negative for abdominal pain.   Genitourinary:  Negative for dysuria.   Musculoskeletal:  Positive for gait problem.        Left hip pain   Psychiatric/Behavioral:  Negative for confusion.      Objective:     Vital Signs (Most Recent):  Temp: 98.3 °F (36.8 °C) (01/20/24 1549)  Pulse: 76 (01/20/24 1549)  Resp: 20 (01/20/24 1549)  BP: 115/81 (01/20/24 1549)  SpO2: 99 % (01/20/24 1549) Vital Signs (24h Range):  Temp:  [97.3 °F (36.3 °C)-98.6 °F (37 °C)] 98.3 °F (36.8 °C)  Pulse:  [] 76  Resp:  [16-20] 20  SpO2:  [95 %-100 %] 99 %  BP: (115-146)/(64-81) 115/81     Weight: 67.6 kg (149 lb)  Body mass index is 24.05 kg/m².    Intake/Output Summary (Last 24 hours) at 1/20/2024 1628  Last data filed at 1/20/2024 1627  Gross per 24 hour   Intake 1130 ml   Output 505 ml   Net 625 ml           Physical Exam  Vitals and nursing note reviewed.   Constitutional:       General: She is not in acute distress.     Appearance: She is ill-appearing.   HENT:      Head: Normocephalic and atraumatic.      Nose: Nose normal.      Mouth/Throat:      Mouth: Mucous membranes are moist.      Comments: Gray vesicular lesions on tongue, and angular cheilitis noted. no white plaques inside mouth  Eyes:      Extraocular Movements: Extraocular movements intact.      Conjunctiva/sclera: Conjunctivae normal.      Pupils: Pupils are equal, round, and reactive to light.    Cardiovascular:      Rate and Rhythm: Normal rate and regular rhythm.      Pulses: Normal pulses.      Heart sounds: Normal heart sounds.   Pulmonary:      Effort: Pulmonary effort is normal.      Breath sounds: Normal breath sounds.   Abdominal:      General: Abdomen is flat. Bowel sounds are normal. There is no distension.      Palpations: Abdomen is soft.      Tenderness: There is no abdominal tenderness.   Musculoskeletal:      Cervical back: Normal range of motion and neck supple.      Left lower leg: Edema present.      Comments: S/p hip washout with dressing and drain in place, sanguinous output   Skin:     General: Skin is warm and dry.   Neurological:      General: No focal deficit present.      Mental Status: She is alert.   Psychiatric:         Mood and Affect: Mood normal.         Behavior: Behavior normal.             Significant Labs: All pertinent labs within the past 24 hours have been reviewed.    Significant Imaging: I have reviewed all pertinent imaging results/findings within the past 24 hours.    Assessment/Plan:      * Staphylococcal arthritis of left hip  - see sepsis due to MRSA      Oral lesion  - painful gray vesicular lesions noted on tongue  - previously thought to be thrush and patient started on nystatin  - does not appear to be thrush on my exam given appearance  - started few days after starting bactrim  - concern for SJS given proximity to bactrim use versus HSV lesions, PCR lesion pending  - viscous lidocaine ordered   - started empiric valtrex    MRSA bacteremia  - Bcx+ MRSA, source likely left hip  - repeat Bcx NGTD  - no vegetation noted on ECHO  - cont vanc  - ID consulted for abx recs- cont vanc      Painful orthopaedic hardware  - septic hip joint with hardware in place  - ortho following   - see sepsis 2/2 MRSA      (HFpEF) heart failure with preserved ejection fraction  Monitor volume status. Control HTN.   - TTE: Left Ventricle: The left ventricle is normal in size.  Normal wall thickness. There is concentric remodeling. Normal wall motion. There is low normal systolic function with a visually estimated ejection fraction of 50 - 55%. There is normal diastolic function.    Right Ventricle: Normal right ventricular cavity size. Wall thickness is normal. Right ventricle wall motion  is normal. Systolic function is normal.    Aortic Valve: There is moderate aortic valve sclerosis. There is moderate annular calcification present.    Mitral Valve: There is severe mitral annular calcification present.    Aorta: Aortic root is normal in size measuring 3.37 cm. Ascending aorta is normal measuring 3.51 cm.    Pulmonary Artery: The estimated pulmonary artery systolic pressure is 25 mmHg.    IVC/SVC: Normal venous pressure at 3 mmHg.    Depression  Continue home Lexapro.        Atrial fibrillation with RVR  Patient has a history of atrial fibrillation and initially went into RVR at outside hospital.  Converted back to sinus rhythm with CCB and amiodarone infusions.  Cardiology was consulted and recommended continuing home amiodarone, metoprolol, and resuming home Eliquis (as patient was noncompliant).  We will monitor on telemetry       Sepsis due to methicillin resistant Staphylococcus aureus (MRSA)  Initially presented to outside hospital with complaints of left hip pain and inability to ambulate, and met sepsis criteria with tachycardia (AFib with RVR) and leukocytosis.  Source is likely left hip septic arthritis.    Blood cultures obtained there, and lactate only mildly elevated which resolved with fluids.    We will continue vancomycin and Zosyn for presumed septic arthritis and consult Orthopedics for further evaluation.  - S/P left hip aspiration with purulent drainage- GS+ GPC, culture + MRSA  - Bcx+ MRSA, repeat NGTD  - ECHO without vegetation  - ID consulted for antibiotic recs- recommend cont vanc, check dapto susceptibility   - ortho following. S/p hip washout and debridement.  Left hip proximal femoral resection with placement of antibiotic cement spacer, Removal of cephalomedullary nail left femur, Incision and drainage with irrigation deep abscess left buttock and thigh, Excisional debridement of fascia and muscle left hip. Drain placed.     Iron deficiency anemia  Chronic, and stable around baseline. Monitor.     Peripheral neuropathy  Continue home Lyrica.    Essential hypertension  Continue hospital formulary of home antihypertensives.     Chronic pain with uncomplicated opioid dependence  PDMP reviewed. Will continue pain control for septic arthritis.         VTE Risk Mitigation (From admission, onward)           Ordered     apixaban tablet 5 mg  2 times daily         01/19/24 1446     Reason for No Pharmacological VTE Prophylaxis  Once        Question:  Reasons:  Answer:  Already adequately anticoagulated on oral Anticoagulants    01/17/24 2037     IP VTE HIGH RISK PATIENT  Once         01/17/24 2037     Place sequential compression device  Until discontinued         01/17/24 2037                    Discharge Planning   BAILEY: 1/23/2024     Code Status: Full Code   Is the patient medically ready for discharge?:     Reason for patient still in hospital (select all that apply): Patient trending condition and Consult recommendations  Discharge Plan A: Home with family, Home Health                  Dianne Vila MD  Department of Hospital Medicine   [facility] - Telemetry Stepdown

## 2024-01-21 LAB
ALBUMIN SERPL BCP-MCNC: 1.6 G/DL (ref 3.5–5.2)
ALP SERPL-CCNC: 156 U/L (ref 55–135)
ALT SERPL W/O P-5'-P-CCNC: 31 U/L (ref 10–44)
ANION GAP SERPL CALC-SCNC: 9 MMOL/L (ref 8–16)
AST SERPL-CCNC: 38 U/L (ref 10–40)
BACTERIA BLD CULT: ABNORMAL
BACTERIA BLD CULT: NORMAL
BACTERIA FLD CULT: ABNORMAL
BASOPHILS # BLD AUTO: 0.04 K/UL (ref 0–0.2)
BASOPHILS NFR BLD: 0.3 % (ref 0–1.9)
BILIRUB SERPL-MCNC: 0.6 MG/DL (ref 0.1–1)
BUN SERPL-MCNC: 16 MG/DL (ref 8–23)
CALCIUM SERPL-MCNC: 8.9 MG/DL (ref 8.7–10.5)
CHLORIDE SERPL-SCNC: 101 MMOL/L (ref 95–110)
CO2 SERPL-SCNC: 22 MMOL/L (ref 23–29)
CREAT SERPL-MCNC: 0.8 MG/DL (ref 0.5–1.4)
CRP SERPL-MCNC: 255.3 MG/L (ref 0–8.2)
DIFFERENTIAL METHOD BLD: ABNORMAL
EOSINOPHIL # BLD AUTO: 0 K/UL (ref 0–0.5)
EOSINOPHIL NFR BLD: 0 % (ref 0–8)
ERYTHROCYTE [DISTWIDTH] IN BLOOD BY AUTOMATED COUNT: 18 % (ref 11.5–14.5)
ERYTHROCYTE [SEDIMENTATION RATE] IN BLOOD BY PHOTOMETRIC METHOD: >120 MM/HR (ref 0–36)
EST. GFR  (NO RACE VARIABLE): >60 ML/MIN/1.73 M^2
GLUCOSE SERPL-MCNC: 97 MG/DL (ref 70–110)
HCT VFR BLD AUTO: 23.4 % (ref 37–48.5)
HGB BLD-MCNC: 7.5 G/DL (ref 12–16)
HSV1 DNA SPEC QL NAA+PROBE: ABNORMAL
HSV2 DNA SPEC QL NAA+PROBE: ABNORMAL
IMM GRANULOCYTES # BLD AUTO: 0.21 K/UL (ref 0–0.04)
IMM GRANULOCYTES NFR BLD AUTO: 1.4 % (ref 0–0.5)
LACTATE SERPL-SCNC: 0.9 MMOL/L (ref 0.5–2.2)
LYMPHOCYTES # BLD AUTO: 1.1 K/UL (ref 1–4.8)
LYMPHOCYTES NFR BLD: 7.4 % (ref 18–48)
MCH RBC QN AUTO: 25.8 PG (ref 27–31)
MCHC RBC AUTO-ENTMCNC: 32.1 G/DL (ref 32–36)
MCV RBC AUTO: 80 FL (ref 82–98)
MONOCYTES # BLD AUTO: 1.1 K/UL (ref 0.3–1)
MONOCYTES NFR BLD: 7.2 % (ref 4–15)
NEUTROPHILS # BLD AUTO: 12.5 K/UL (ref 1.8–7.7)
NEUTROPHILS NFR BLD: 83.7 % (ref 38–73)
NRBC BLD-RTO: 0 /100 WBC
PLATELET # BLD AUTO: 769 K/UL (ref 150–450)
PMV BLD AUTO: 9.8 FL (ref 9.2–12.9)
POTASSIUM SERPL-SCNC: 3.2 MMOL/L (ref 3.5–5.1)
PROT SERPL-MCNC: 6 G/DL (ref 6–8.4)
RBC # BLD AUTO: 2.91 M/UL (ref 4–5.4)
SODIUM SERPL-SCNC: 132 MMOL/L (ref 136–145)
SPECIMEN SOURCE: ABNORMAL
VANCOMYCIN SERPL-MCNC: 18.3 UG/ML
VANCOMYCIN SERPL-MCNC: 23 UG/ML
WBC # BLD AUTO: 14.92 K/UL (ref 3.9–12.7)

## 2024-01-21 PROCEDURE — 97530 THERAPEUTIC ACTIVITIES: CPT

## 2024-01-21 PROCEDURE — 83605 ASSAY OF LACTIC ACID: CPT | Performed by: NURSE PRACTITIONER

## 2024-01-21 PROCEDURE — 20600001 HC STEP DOWN PRIVATE ROOM

## 2024-01-21 PROCEDURE — 99233 SBSQ HOSP IP/OBS HIGH 50: CPT | Mod: ,,, | Performed by: INTERNAL MEDICINE

## 2024-01-21 PROCEDURE — 25000003 PHARM REV CODE 250: Performed by: STUDENT IN AN ORGANIZED HEALTH CARE EDUCATION/TRAINING PROGRAM

## 2024-01-21 PROCEDURE — 63600175 PHARM REV CODE 636 W HCPCS: Performed by: STUDENT IN AN ORGANIZED HEALTH CARE EDUCATION/TRAINING PROGRAM

## 2024-01-21 PROCEDURE — 85652 RBC SED RATE AUTOMATED: CPT | Performed by: NURSE PRACTITIONER

## 2024-01-21 PROCEDURE — 97112 NEUROMUSCULAR REEDUCATION: CPT

## 2024-01-21 PROCEDURE — 97161 PT EVAL LOW COMPLEX 20 MIN: CPT

## 2024-01-21 PROCEDURE — 25000003 PHARM REV CODE 250: Performed by: HOSPITALIST

## 2024-01-21 PROCEDURE — 80053 COMPREHEN METABOLIC PANEL: CPT | Performed by: STUDENT IN AN ORGANIZED HEALTH CARE EDUCATION/TRAINING PROGRAM

## 2024-01-21 PROCEDURE — 86140 C-REACTIVE PROTEIN: CPT | Performed by: NURSE PRACTITIONER

## 2024-01-21 PROCEDURE — 25000003 PHARM REV CODE 250: Performed by: NURSE PRACTITIONER

## 2024-01-21 PROCEDURE — 36415 COLL VENOUS BLD VENIPUNCTURE: CPT | Performed by: STUDENT IN AN ORGANIZED HEALTH CARE EDUCATION/TRAINING PROGRAM

## 2024-01-21 PROCEDURE — 85025 COMPLETE CBC W/AUTO DIFF WBC: CPT | Performed by: STUDENT IN AN ORGANIZED HEALTH CARE EDUCATION/TRAINING PROGRAM

## 2024-01-21 PROCEDURE — 80202 ASSAY OF VANCOMYCIN: CPT | Mod: 91 | Performed by: STUDENT IN AN ORGANIZED HEALTH CARE EDUCATION/TRAINING PROGRAM

## 2024-01-21 RX ORDER — POTASSIUM CHLORIDE 7.45 MG/ML
10 INJECTION INTRAVENOUS
Status: DISPENSED | OUTPATIENT
Start: 2024-01-21 | End: 2024-01-21

## 2024-01-21 RX ADMIN — METHOCARBAMOL 500 MG: 500 TABLET ORAL at 01:01

## 2024-01-21 RX ADMIN — APIXABAN 5 MG: 5 TABLET, FILM COATED ORAL at 09:01

## 2024-01-21 RX ADMIN — DOCUSATE SODIUM 100 MG: 100 CAPSULE, LIQUID FILLED ORAL at 09:01

## 2024-01-21 RX ADMIN — LIDOCAINE HYDROCHLORIDE 5 ML: 20 SOLUTION ORAL; TOPICAL at 06:01

## 2024-01-21 RX ADMIN — OXYCODONE HYDROCHLORIDE 10 MG: 10 TABLET ORAL at 01:01

## 2024-01-21 RX ADMIN — OXYCODONE HYDROCHLORIDE 10 MG: 10 TABLET ORAL at 09:01

## 2024-01-21 RX ADMIN — ACETAMINOPHEN 650 MG: 325 TABLET ORAL at 01:01

## 2024-01-21 RX ADMIN — NYSTATIN 500000 UNITS: 100000 SUSPENSION ORAL at 09:01

## 2024-01-21 RX ADMIN — OXYCODONE HYDROCHLORIDE 10 MG: 10 TABLET ORAL at 06:01

## 2024-01-21 RX ADMIN — PREGABALIN 200 MG: 100 CAPSULE ORAL at 09:01

## 2024-01-21 RX ADMIN — NYSTATIN 500000 UNITS: 100000 SUSPENSION ORAL at 01:01

## 2024-01-21 RX ADMIN — LIDOCAINE HYDROCHLORIDE 5 ML: 20 SOLUTION ORAL; TOPICAL at 10:01

## 2024-01-21 RX ADMIN — MUPIROCIN: 20 OINTMENT TOPICAL at 09:01

## 2024-01-21 RX ADMIN — VANCOMYCIN HYDROCHLORIDE 500 MG: 500 INJECTION, POWDER, LYOPHILIZED, FOR SOLUTION INTRAVENOUS at 06:01

## 2024-01-21 RX ADMIN — METOPROLOL TARTRATE 50 MG: 50 TABLET, FILM COATED ORAL at 09:01

## 2024-01-21 RX ADMIN — VALACYCLOVIR HYDROCHLORIDE 1000 MG: 500 TABLET, FILM COATED ORAL at 09:01

## 2024-01-21 RX ADMIN — METHOCARBAMOL 500 MG: 500 TABLET ORAL at 09:01

## 2024-01-21 RX ADMIN — PREGABALIN 200 MG: 100 CAPSULE ORAL at 06:01

## 2024-01-21 RX ADMIN — OXYCODONE HYDROCHLORIDE 10 MG: 10 TABLET ORAL at 02:01

## 2024-01-21 RX ADMIN — NYSTATIN 500000 UNITS: 100000 SUSPENSION ORAL at 06:01

## 2024-01-21 RX ADMIN — AMLODIPINE BESYLATE 5 MG: 5 TABLET ORAL at 09:01

## 2024-01-21 RX ADMIN — LIDOCAINE HYDROCHLORIDE 5 ML: 20 SOLUTION ORAL; TOPICAL at 02:01

## 2024-01-21 RX ADMIN — POTASSIUM CHLORIDE 10 MEQ: 7.46 INJECTION, SOLUTION INTRAVENOUS at 01:01

## 2024-01-21 RX ADMIN — AMIODARONE HYDROCHLORIDE 200 MG: 200 TABLET ORAL at 09:01

## 2024-01-21 RX ADMIN — ESCITALOPRAM OXALATE 20 MG: 5 TABLET, FILM COATED ORAL at 09:01

## 2024-01-21 NOTE — PLAN OF CARE
PT evaluation complete - see note for details. POC and goals established.    Problem: Physical Therapy  Goal: Physical Therapy Goal  Description: Goals to be met by: 24     Patient will increase functional independence with mobility by performin. Supine to sit with Contact Guard Assistance  2. Sit to supine with Contact Guard Assistance  3. Sit to stand transfer with Minimal Assistance  4. Bed to chair transfer with Minimal Assistance using Rolling Walker  5. Gait  x 100 feet with Minimal Assistance using Rolling Walker.     Outcome: Ongoing, Progressing   2024

## 2024-01-21 NOTE — ASSESSMENT & PLAN NOTE
- painful gray vesicular lesions noted on tongue  - previously thought to be thrush and patient started on nystatin  - does not appear to be thrush on my exam given appearance  - started few days after starting bactrim  - concern for SJS given proximity to bactrim use versus HSV lesions, PCR indeterminate, HSV DNA detected but unable to distinguish 1 or 2  - viscous lidocaine ordered   - cont empiric valtrex

## 2024-01-21 NOTE — ASSESSMENT & PLAN NOTE
Initially presented to outside hospital with complaints of left hip pain and inability to ambulate, and met sepsis criteria with tachycardia (AFib with RVR) and leukocytosis.  Source is likely left hip septic arthritis.    Blood cultures obtained there, and lactate only mildly elevated which resolved with fluids.    We will continue vancomycin and Zosyn for presumed septic arthritis and consult Orthopedics for further evaluation.  - S/P left hip aspiration with purulent drainage- culture + MRSA  - Bcx+ MRSA, repeat NGTD  - ECHO without vegetation  - ID consulted for antibiotic recs- recommend cont vanc, check dapto susceptibility - susceptible  - ortho following. S/p hip washout and debridement. Left hip proximal femoral resection with placement of antibiotic cement spacer, Removal of cephalomedullary nail left femur, Incision and drainage with irrigation deep abscess left buttock and thigh, Excisional debridement of fascia and muscle left hip. Drain and wound vac placed.   - place PICC line

## 2024-01-21 NOTE — SUBJECTIVE & OBJECTIVE
Principal Problem:Staphylococcal arthritis of left hip    Principal Orthopedic Problem: s/p nail removal and abx spacer 1/19    Interval History: POD2.  Afebrile, VSS, NAEON.  Pain has been reportedly well controlled.  Drain with 110 cc's overnight.  Blood cultures no growth to date; PICC team consulted for line placement.         Review of patient's allergies indicates:  No Known Allergies    Current Facility-Administered Medications   Medication    (Magic mouthwash) 1:1:1 diphenhydrAMINE(Benadryl) 12.5mg/5ml liq, aluminum & magnesium hydroxide-simethicone (Maalox), LIDOcaine viscous 2%    acetaminophen tablet 650 mg    aluminum-magnesium hydroxide-simethicone 200-200-20 mg/5 mL suspension 30 mL    amiodarone tablet 200 mg    amLODIPine tablet 5 mg    apixaban tablet 5 mg    docusate sodium capsule 100 mg    EScitalopram oxalate tablet 20 mg    guaiFENesin 100 mg/5 ml syrup 200 mg    oxyCODONE immediate release tablet 5 mg    And    oxyCODONE immediate release tablet Tab 10 mg    And    HYDROmorphone injection 0.5 mg    LIDOcaine viscous HCl 2% oral solution 5 mL    melatonin tablet 6 mg    methocarbamoL tablet 500 mg    metoprolol tartrate (LOPRESSOR) tablet 50 mg    mupirocin 2 % ointment    naloxone 0.4 mg/mL injection 0.02 mg    nystatin 100,000 unit/mL suspension 500,000 Units    ondansetron disintegrating tablet 8 mg    polyethylene glycol packet 17 g    potassium chloride 10 mEq in 100 mL IVPB    pregabalin capsule 200 mg    simethicone chewable tablet 80 mg    sodium chloride 0.9% flush 1-10 mL    valACYclovir tablet 1,000 mg    vancomycin - pharmacy to dose     Objective:     Vital Signs (Most Recent):  Temp: 99.3 °F (37.4 °C) (01/21/24 1138)  Pulse: 73 (01/21/24 1138)  Resp: 18 (01/21/24 1138)  BP: 131/60 (01/21/24 1138)  SpO2: 96 % (01/21/24 1138) Vital Signs (24h Range):  Temp:  [98.3 °F (36.8 °C)-101.3 °F (38.5 °C)] 99.3 °F (37.4 °C)  Pulse:  [73-90] 73  Resp:  [16-20] 18  SpO2:  [96 %-99 %] 96 %  BP:  "(115159)/(60-81) 131/60     Weight: 67.6 kg (149 lb)  Height: 5' 6" (167.6 cm)  Body mass index is 24.05 kg/m².      Intake/Output Summary (Last 24 hours) at 1/21/2024 1221  Last data filed at 1/21/2024 0651  Gross per 24 hour   Intake 750 ml   Output 860 ml   Net -110 ml          Ortho/SPM Exam  AAOx4  NAD  Reg rate  No increased WOB    LLE:  Wound vac in place with seal intact; canister dry   SILT T/SP/DP/Vargas/Sa  Motor intact T/SP/DP  WWP extremities  FCDs in place and functioning.      Significant Labs: All pertinent labs within the past 24 hours have been reviewed.    Significant Imaging: I have reviewed all pertinent imaging results/findings.  "

## 2024-01-21 NOTE — SUBJECTIVE & OBJECTIVE
Interval History: Mouth pain improving. Cont vatrex.     Repeat Bcx NGTD. PICC line placement ordered  On vanc for MRSA- also susceptible to dapto       Review of Systems   Constitutional:  Negative for fever.   HENT:  Positive for mouth sores and trouble swallowing.    Respiratory:  Negative for shortness of breath.    Cardiovascular:  Negative for chest pain.   Gastrointestinal:  Negative for abdominal pain.   Genitourinary:  Negative for dysuria.   Musculoskeletal:  Positive for gait problem.        Left hip pain   Psychiatric/Behavioral:  Negative for confusion.      Objective:     Vital Signs (Most Recent):  Temp: 99.3 °F (37.4 °C) (01/21/24 1530)  Pulse: 79 (01/21/24 1530)  Resp: 16 (01/21/24 1530)  BP: 133/61 (01/21/24 1530)  SpO2: 95 % (01/21/24 1530) Vital Signs (24h Range):  Temp:  [98.8 °F (37.1 °C)-101.3 °F (38.5 °C)] 99.3 °F (37.4 °C)  Pulse:  [73-90] 79  Resp:  [16-18] 16  SpO2:  [95 %-97 %] 95 %  BP: (127-159)/(60-68) 133/61     Weight: 67.6 kg (149 lb)  Body mass index is 24.05 kg/m².    Intake/Output Summary (Last 24 hours) at 1/21/2024 1628  Last data filed at 1/21/2024 0651  Gross per 24 hour   Intake 300 ml   Output 860 ml   Net -560 ml           Physical Exam  Vitals and nursing note reviewed.   Constitutional:       General: She is not in acute distress.     Appearance: She is ill-appearing.   HENT:      Head: Normocephalic and atraumatic.      Nose: Nose normal.      Mouth/Throat:      Mouth: Mucous membranes are moist.      Comments: Gray vesicular lesions on tongue, and angular cheilitis noted. no white plaques inside mouth  Eyes:      Extraocular Movements: Extraocular movements intact.      Conjunctiva/sclera: Conjunctivae normal.      Pupils: Pupils are equal, round, and reactive to light.   Cardiovascular:      Rate and Rhythm: Normal rate and regular rhythm.      Pulses: Normal pulses.      Heart sounds: Normal heart sounds.   Pulmonary:      Effort: Pulmonary effort is normal.       Breath sounds: Normal breath sounds.   Abdominal:      General: Abdomen is flat. Bowel sounds are normal. There is no distension.      Palpations: Abdomen is soft.      Tenderness: There is no abdominal tenderness.   Musculoskeletal:      Cervical back: Normal range of motion and neck supple.      Left lower leg: Edema present.      Comments: S/p hip washout with dressing and drain in place, sanguinous output, wound vac in place   Skin:     General: Skin is warm and dry.   Neurological:      General: No focal deficit present.      Mental Status: She is alert.   Psychiatric:         Mood and Affect: Mood normal.         Behavior: Behavior normal.             Significant Labs: All pertinent labs within the past 24 hours have been reviewed.    Significant Imaging: I have reviewed all pertinent imaging results/findings within the past 24 hours.

## 2024-01-21 NOTE — SUBJECTIVE & OBJECTIVE
Interval History: Doing okay. Tolerating abx. Some incisional pain.    Review of Systems   Constitutional:  Negative for chills and fever.   All other systems reviewed and are negative.    Objective:     Vital Signs (Most Recent):  Temp: 99.3 °F (37.4 °C) (01/21/24 1138)  Pulse: 73 (01/21/24 1138)  Resp: 18 (01/21/24 1334)  BP: 131/60 (01/21/24 1138)  SpO2: 96 % (01/21/24 1138) Vital Signs (24h Range):  Temp:  [98.3 °F (36.8 °C)-101.3 °F (38.5 °C)] 99.3 °F (37.4 °C)  Pulse:  [73-90] 73  Resp:  [16-20] 18  SpO2:  [96 %-99 %] 96 %  BP: (115-159)/(60-81) 131/60     Weight: 67.6 kg (149 lb)  Body mass index is 24.05 kg/m².    Estimated Creatinine Clearance: 56 mL/min (based on SCr of 0.8 mg/dL).     Physical Exam  Vitals and nursing note reviewed.   Constitutional:       General: She is not in acute distress.     Appearance: She is not ill-appearing, toxic-appearing or diaphoretic.   Eyes:      Pupils: Pupils are equal, round, and reactive to light.   Skin:     Findings: No erythema.   Neurological:      General: No focal deficit present.      Mental Status: She is alert.   Psychiatric:         Mood and Affect: Mood normal.         Behavior: Behavior normal.         Thought Content: Thought content normal.         Judgment: Judgment normal.          Significant Labs: Blood Culture:   Recent Labs   Lab 01/16/24  1413 01/18/24  1655 01/18/24  1704   LABBLOO No Growth to date  No Growth to date  No Growth to date  No Growth to date  No Growth to date  Gram stain aer bottle:  Gram positive cocci  Results called to and read back by:  Romaine Doan RN ER 1/17/24 @ 8167 JLR  METHICILLIN RESISTANT STAPHYLOCOCCUS AUREUS  Known MRSA patient  * No Growth to date  No Growth to date  No Growth to date No Growth to date  No Growth to date  No Growth to date     BMP:   Recent Labs   Lab 01/21/24  0532   GLU 97   *   K 3.2*      CO2 22*   BUN 16   CREATININE 0.8   CALCIUM 8.9     CBC:   Recent Labs   Lab  01/20/24  0821 01/21/24  0532   WBC 20.94* 14.92*   HGB 9.3* 7.5*   HCT 27.6* 23.4*    769*     Wound Culture:   Recent Labs   Lab 01/18/24  1006 01/18/24  1013   LABAERO METHICILLIN RESISTANT STAPHYLOCOCCUS AUREUS  Many  * METHICILLIN RESISTANT STAPHYLOCOCCUS AUREUS  Many  For susceptibility see order #[id number]  *       Significant Imaging: I have reviewed all pertinent imaging results/findings within the past 24 hours.

## 2024-01-21 NOTE — PROGRESS NOTES
Pharmacokinetic Assessment Follow Up: IV Vancomycin    Vancomycin serum concentration assessment(s):    The trough level was drawn correctly and can be used to guide therapy at this time. The measurement is above the desired definitive target range of 15 to 20 mcg/mL.  - The trough level was drawn ~12 hours after previous dose and resulted at 25.9.    Vancomycin Regimen Plan:    Discontinue the scheduled vancomycin regimen and re-dose when the random level is less than 20 mcg/mL, next level to be drawn at 0430 with AM labs on 1/21.  - Ms. Ray's renal function appears stable and at baseline, but her level is still high despite decreasing dose.   - I think she's accumulating because of the Q12H regimen, but stopped regimen to assess next level before considering rescheduling a regimen. She will likely need a Q24H regimen.  - Will pulse dose at least once to determine her clearance better.     Drug levels (last 3 results):  Recent Labs   Lab Result Units 01/18/24 2022 01/19/24  0129 01/20/24  2138   Vancomycin, Random ug/mL  --  22.5  --    Vancomycin-Trough ug/mL 29.6*  --  25.9*       Pharmacy will continue to follow and monitor vancomycin.    Please contact pharmacy at extension [phone] for questions regarding this assessment.    Thank you for the consult,   Jayde Ramos       Patient brief summary:  Froilan Ray is a 78 y.o. female initiated on antimicrobial therapy with IV Vancomycin for treatment of bone/joint infection    The patient's current regimen is pulse dosing in the setting of supratherapeutic levels    Actual Body Weight:   67.6 kg    Renal Function:   Estimated Creatinine Clearance: 56 mL/min (based on SCr of 0.8 mg/dL).     Dialysis Method (if applicable):  N/A

## 2024-01-21 NOTE — PLAN OF CARE
Pt AAOx4, c/o of incisional pain on her left hip, PRN oXycodone given per pt request. Dressing CDI with wound vac @ 125, BEULAH drain CDI, out put was 50ml on PM shift, serosanguinous. Boyd CDI, care done. VSS, no acute distress noted. POC on going.    Problem: Adult Inpatient Plan of Care  Goal: Plan of Care Review  Outcome: Ongoing, Progressing  Goal: Absence of Hospital-Acquired Illness or Injury  Outcome: Ongoing, Progressing  Goal: Optimal Comfort and Wellbeing  Outcome: Ongoing, Progressing  Goal: Readiness for Transition of Care  Outcome: Ongoing, Progressing     Problem: Skin Injury Risk Increased  Goal: Skin Health and Integrity  Outcome: Ongoing, Progressing     Problem: Fall Injury Risk  Goal: Absence of Fall and Fall-Related Injury  Outcome: Ongoing, Progressing

## 2024-01-21 NOTE — PROGRESS NOTES
[facility] - Telemetry Stepdown  Orthopedics  Progress Note    Patient Name: Froilan Ray  MRN: [id number]  Admission Date: 1/17/2024  Hospital Length of Stay: 4 days  Attending Provider: Dianne Vila MD  Primary Care Provider: Alphonse Boothe III, MD  Follow-up For: Procedure(s) (LRB):  ARTHROPLASTY, HIP, GIRDLESTONE, POSTERIOR APPROACH (Left)  IRRIGATION AND DEBRIDEMENT, LOWER EXTREMITY (Left)    Post-Operative Day: 2 Days Post-Op  Subjective:     Principal Problem:Staphylococcal arthritis of left hip    Principal Orthopedic Problem: s/p nail removal and abx spacer 1/19    Interval History: POD2.  Afebrile, VSS, NAEON.  Pain has been reportedly well controlled.  Drain with 110 cc's overnight.  Blood cultures no growth to date; PICC team consulted for line placement.         Review of patient's allergies indicates:  No Known Allergies    Current Facility-Administered Medications   Medication    (Magic mouthwash) 1:1:1 diphenhydrAMINE(Benadryl) 12.5mg/5ml liq, aluminum & magnesium hydroxide-simethicone (Maalox), LIDOcaine viscous 2%    acetaminophen tablet 650 mg    aluminum-magnesium hydroxide-simethicone 200-200-20 mg/5 mL suspension 30 mL    amiodarone tablet 200 mg    amLODIPine tablet 5 mg    apixaban tablet 5 mg    docusate sodium capsule 100 mg    EScitalopram oxalate tablet 20 mg    guaiFENesin 100 mg/5 ml syrup 200 mg    oxyCODONE immediate release tablet 5 mg    And    oxyCODONE immediate release tablet Tab 10 mg    And    HYDROmorphone injection 0.5 mg    LIDOcaine viscous HCl 2% oral solution 5 mL    melatonin tablet 6 mg    methocarbamoL tablet 500 mg    metoprolol tartrate (LOPRESSOR) tablet 50 mg    mupirocin 2 % ointment    naloxone 0.4 mg/mL injection 0.02 mg    nystatin 100,000 unit/mL suspension 500,000 Units    ondansetron disintegrating tablet 8 mg    polyethylene glycol packet 17 g    potassium chloride 10 mEq in 100 mL IVPB    pregabalin capsule 200 mg    simethicone chewable tablet 80  "mg    sodium chloride 0.9% flush 1-10 mL    valACYclovir tablet 1,000 mg    vancomycin - pharmacy to dose     Objective:     Vital Signs (Most Recent):  Temp: 99.3 °F (37.4 °C) (01/21/24 1138)  Pulse: 73 (01/21/24 1138)  Resp: 18 (01/21/24 1138)  BP: 131/60 (01/21/24 1138)  SpO2: 96 % (01/21/24 1138) Vital Signs (24h Range):  Temp:  [98.3 °F (36.8 °C)-101.3 °F (38.5 °C)] 99.3 °F (37.4 °C)  Pulse:  [73-90] 73  Resp:  [16-20] 18  SpO2:  [96 %-99 %] 96 %  BP: (115-159)/(60-81) 131/60     Weight: 67.6 kg (149 lb)  Height: 5' 6" (167.6 cm)  Body mass index is 24.05 kg/m².      Intake/Output Summary (Last 24 hours) at 1/21/2024 1221  Last data filed at 1/21/2024 0651  Gross per 24 hour   Intake 750 ml   Output 860 ml   Net -110 ml         Ortho/SPM Exam  AAOx4  NAD  Reg rate  No increased WOB    LLE:  Wound vac in place with seal intact; canister dry   SILT T/SP/DP/Vargas/Sa  Motor intact T/SP/DP  WWP extremities  FCDs in place and functioning.      Significant Labs: All pertinent labs within the past 24 hours have been reviewed.    Significant Imaging: I have reviewed all pertinent imaging results/findings.  Assessment/Plan:     * Staphylococcal arthritis of left hip  Froilan Ray is a 78 y.o. female w/PMH of HTN, HLD, CHF, chronic pain on opioids, peripheral neuropathy, L-intertroch fx s/p TFNA (12/11/2018, Dr. Eulalio De La Cruz), who presents as transfer from OSH with large left hip fluid collection and XR showing femoral head collapse with cut-through of the TFNA helical blade. Blood cx + for MRSA, on vanc/ceftaroline, ID consulted. Pt underwent IR aspiration of L hip on 1/18 with 72cc of juan pus aspirated, gram stain + for gram positive cocci in clusters.    » s/p nail removal and abx spacer 1/19    Pain control: MM  PT/OT:  TTWB LLE  DVT PPx: eliquis 5 bid for a fib, SCDs at all times when not ambulating  Abx:  Per ID; currently receiving vancomycin  Repeat blood cultures NGTD  PICC team consulted     » Dispo: f/u " final ID recs         Painful orthopaedic hardware  .          OTF Bustos MD  Orthopedics  [facility] - Telemetry Stepdown

## 2024-01-21 NOTE — PROGRESS NOTES
"[facility] - Telemetry Stepdown  Infectious Disease  Progress Note    Patient Name: Froilan Ray  MRN: [id number]  Admission Date: 1/17/2024  Length of Stay: 4 days  Attending Physician: Dianne Vila MD  Primary Care Provider: Alphonse Boothe III, MD    Isolation Status: No active isolations    Assessment/Plan:        MRSA bacteremia    78F with h/o HTN, CHF admitted 1/17 as transfer from [facility] for concern for septic L hip joint. Taken to OR today 1/19 for: Left hip proximal femoral resection with placement of antibiotic cement spacer, Removal of cephalomedullary nail left femur, Incision and drainage with irrigation deep abscess left buttock and thigh, Excisional debridement of fascia and muscle left hip. Bcx 1/16 with MRSA; repeat drawn 1/18 NGTD. 2d echo- adequate study, no veg. Currently on vanc/zosy. HSV pcr sent of lip blister- pending. ID consulted for "painful tongue lesions, HSV?, painful oral blisters after starting bactrim, SJS?, antibiotic recs for septic joint currently on Vanc     Likely buttock/gluteal abscess, necessitating to femoral head.    Recommendations:   - continue vancomycin, pharmacy dosing   - f/u repeat bcx to document clearance  - f/u HSV pcr  - anticipating 6 weeks IV abx with possible po abx suppression afterwards  - checked susceptibility of daptomycin: susceptible      Juanito Rodriguez MD  Infectious Disease  [facility] - Telemetry Stepdown    Subjective:     Principal Problem:Staphylococcal arthritis of left hip    HPI: 78F with h/o HTN, CHF admitted 1/17 as transfer from [facility] for concern for septic joint. Per h&p, pt had reported unable to bear weight on L leg due to hip pain and +fevers. CT showed fluid around her L hip previous surgical site, concerning for septic arthritis. Was in AFib with RVR, and converted to sinus rhythm with cardizem and amiodarone infusions. She was given antibiotics and transferred here for Ortho evaluation.     Taken to OR today 1/19 for: Left hip " proximal femoral resection with placement of antibiotic cement spacer                          Removal of cephalomedullary nail left femur  Incision and drainage with irrigation deep abscess left buttock and thigh  Excisional debridement of fascia and muscle left hip, 100 sq cm - 69270, 01/01/2004 6 times 4      Pt just out of surgery and is sedated. Unable to answer any questions    Bcx 1/16 with MRSA; repeat drawn 1/18 NGTD pending    Methicillin resistant Staphylococcus aureus       CULTURE, BLOOD     Ceftriaxone >32 mcg/mL Resistant     Clindamycin <=0.5 mcg/mL Sensitive     Erythromycin >4 mcg/mL Resistant     Oxacillin >2 mcg/mL Resistant     Penicillin >8 mcg/mL Resistant     Tetracycline <=4 mcg/mL Sensitive     Trimeth/Sulfa >2/38 mcg/mL Resistant     Vancomycin 1 mcg/mL Sensitive        Hip aspirated 1/18  Aerobic Bacterial Culture      Abnormal   STAPHYLOCOCCUS AUREUS  Many  For susceptibility see order #[id number]  P      Resulting Agency [facility]              Narrative  Performed by: [facility]  Left hip joint aspiration             OR cultures sent today, pending        2d echo    Left Ventricle: The left ventricle is normal in size. Normal wall thickness. There is concentric remodeling. Normal wall motion. There is low normal systolic function with a visually estimated ejection fraction of 50 - 55%. There is normal diastolic function.    Right Ventricle: Normal right ventricular cavity size. Wall thickness is normal. Right ventricle wall motion  is normal. Systolic function is normal.    Aortic Valve: There is moderate aortic valve sclerosis. There is moderate annular calcification present.    Mitral Valve: There is severe mitral annular calcification present.    Aorta: Aortic root is normal in size measuring 3.37 cm. Ascending aorta is normal measuring 3.51 cm.    Pulmonary Artery: The estimated pulmonary artery systolic pressure is 25 mmHg.    IVC/SVC: Normal venous pressure at 3 mmHg.         Currently on  "vanc/zosyn    HSV pcr sent of lip blister- pending    ID consulted for "painful tongue lesions, HSV?, painful oral blisters after starting bactrim, SJS?, antibiotic recs for septic joint currently on Vanc   Interval History: Doing okay. Tolerating abx. Some incisional pain.    Review of Systems   Constitutional:  Negative for chills and fever.   All other systems reviewed and are negative.    Objective:     Vital Signs (Most Recent):  Temp: 99.3 °F (37.4 °C) (01/21/24 1138)  Pulse: 73 (01/21/24 1138)  Resp: 18 (01/21/24 1334)  BP: 131/60 (01/21/24 1138)  SpO2: 96 % (01/21/24 1138) Vital Signs (24h Range):  Temp:  [98.3 °F (36.8 °C)-101.3 °F (38.5 °C)] 99.3 °F (37.4 °C)  Pulse:  [73-90] 73  Resp:  [16-20] 18  SpO2:  [96 %-99 %] 96 %  BP: (115-159)/(60-81) 131/60     Weight: 67.6 kg (149 lb)  Body mass index is 24.05 kg/m².    Estimated Creatinine Clearance: 56 mL/min (based on SCr of 0.8 mg/dL).     Physical Exam  Vitals and nursing note reviewed.   Constitutional:       General: She is not in acute distress.     Appearance: She is not ill-appearing, toxic-appearing or diaphoretic.   Eyes:      Pupils: Pupils are equal, round, and reactive to light.   Skin:     Findings: No erythema.   Neurological:      General: No focal deficit present.      Mental Status: She is alert.   Psychiatric:         Mood and Affect: Mood normal.         Behavior: Behavior normal.         Thought Content: Thought content normal.         Judgment: Judgment normal.          Significant Labs: Blood Culture:   Recent Labs   Lab 01/16/24  1413 01/18/24  1655 01/18/24  1704   LABBLOO No Growth to date  No Growth to date  No Growth to date  No Growth to date  No Growth to date  Gram stain aer bottle:  Gram positive cocci  Results called to and read back by:  Romaine Doan RN ER 1/17/24 @ 8216 JLR  METHICILLIN RESISTANT STAPHYLOCOCCUS AUREUS  Known MRSA patient  * No Growth to date  No Growth to date  No Growth to date No Growth to date "  No Growth to date  No Growth to date     BMP:   Recent Labs   Lab 01/21/24  0532   GLU 97   *   K 3.2*      CO2 22*   BUN 16   CREATININE 0.8   CALCIUM 8.9     CBC:   Recent Labs   Lab 01/20/24  0821 01/21/24  0532   WBC 20.94* 14.92*   HGB 9.3* 7.5*   HCT 27.6* 23.4*    769*     Wound Culture:   Recent Labs   Lab 01/18/24  1006 01/18/24  1013   LABAERO METHICILLIN RESISTANT STAPHYLOCOCCUS AUREUS  Many  * METHICILLIN RESISTANT STAPHYLOCOCCUS AUREUS  Many  For susceptibility see order #[id number]  *       Significant Imaging: I have reviewed all pertinent imaging results/findings within the past 24 hours.

## 2024-01-21 NOTE — PROGRESS NOTES
"[facility] - Telemetry [facility] Medicine  Progress Note    Patient Name: Froilan Ray  MRN: [id number]  Patient Class: IP- Inpatient   Admission Date: 1/17/2024  Length of Stay: 4 days  Attending Physician: Dianne Vila MD  Primary Care Provider: Alphonse Boothe III, MD        Subjective:     Principal Problem:Staphylococcal arthritis of left hip        HPI:  Froilan Ray is a 78 y.o. female with history of L hip surgery, HTN, HLD, CHF, chronic pain on opioids, peripheral neuropathy who was transferred from [facility] for concerns for septic joint.     Patient is a poor historian regarding timing and characterization of her symptoms. She reports that for "a long time" she has been "unable to do anything," basically lying in bed most of the day as she is unable to bear weight on her L leg due to hip pain. She reports chronic hip pain for which she takes "2 Percocets every 6 hours," however this has not relieved her pain for at least the past 2-3 weeks. She denies any fever, n/v, diarrhea, or other systemic symptoms except dry mouth.     At the OSH, she met sepsis criteria with leukocytosis and tachycardia. CT showed fluid around her L hip previous surgical site, concerning for septic arthritis. Was in AFib with RVR, and converted to sinus rhythm with cardizem and amiodarone infusions. She was given antibiotics and transferred here for Ortho evaluation.     Overview/Hospital Course:  Patient admitted for septic hip. S/p aspiration with purulent fluid removal, culture +Staph. Bcx+ MRSA. Started on vanc/zosyn- deescalated to vanc. Ortho consulted and following. S/p hip washout and debridement with Left hip proximal femoral resection with placement of antibiotic cement spacer, Removal of cephalomedullary nail left femur, Incision and drainage with irrigation deep abscess left buttock and thigh, Excisional debridement of fascia and muscle left hip. Drain and wound vac placed.   Noted painful lesions on " tongue after starting bactrim considering SJS vs HSV as lesions appear vesicular. Viscous lidocaine for mouth pain. Started empiric valtrex. HSV PCR indeterminate, HSV DNA detected but couldn't differentiate between 1 and 2. Cont valtrex.  ID consulted for abx recs, cont vanc. Repeat Bcx NGTD.    PT/OT consulted.     Interval History: Mouth pain improving. Cont vatrex.     Repeat Bcx NGTD. PICC line placement ordered  On vanc for MRSA- also susceptible to dapto       Review of Systems   Constitutional:  Negative for fever.   HENT:  Positive for mouth sores and trouble swallowing.    Respiratory:  Negative for shortness of breath.    Cardiovascular:  Negative for chest pain.   Gastrointestinal:  Negative for abdominal pain.   Genitourinary:  Negative for dysuria.   Musculoskeletal:  Positive for gait problem.        Left hip pain   Psychiatric/Behavioral:  Negative for confusion.      Objective:     Vital Signs (Most Recent):  Temp: 99.3 °F (37.4 °C) (01/21/24 1530)  Pulse: 79 (01/21/24 1530)  Resp: 16 (01/21/24 1530)  BP: 133/61 (01/21/24 1530)  SpO2: 95 % (01/21/24 1530) Vital Signs (24h Range):  Temp:  [98.8 °F (37.1 °C)-101.3 °F (38.5 °C)] 99.3 °F (37.4 °C)  Pulse:  [73-90] 79  Resp:  [16-18] 16  SpO2:  [95 %-97 %] 95 %  BP: (127-159)/(60-68) 133/61     Weight: 67.6 kg (149 lb)  Body mass index is 24.05 kg/m².    Intake/Output Summary (Last 24 hours) at 1/21/2024 1628  Last data filed at 1/21/2024 0651  Gross per 24 hour   Intake 300 ml   Output 860 ml   Net -560 ml           Physical Exam  Vitals and nursing note reviewed.   Constitutional:       General: She is not in acute distress.     Appearance: She is ill-appearing.   HENT:      Head: Normocephalic and atraumatic.      Nose: Nose normal.      Mouth/Throat:      Mouth: Mucous membranes are moist.      Comments: Gray vesicular lesions on tongue, and angular cheilitis noted. no white plaques inside mouth  Eyes:      Extraocular Movements: Extraocular  movements intact.      Conjunctiva/sclera: Conjunctivae normal.      Pupils: Pupils are equal, round, and reactive to light.   Cardiovascular:      Rate and Rhythm: Normal rate and regular rhythm.      Pulses: Normal pulses.      Heart sounds: Normal heart sounds.   Pulmonary:      Effort: Pulmonary effort is normal.      Breath sounds: Normal breath sounds.   Abdominal:      General: Abdomen is flat. Bowel sounds are normal. There is no distension.      Palpations: Abdomen is soft.      Tenderness: There is no abdominal tenderness.   Musculoskeletal:      Cervical back: Normal range of motion and neck supple.      Left lower leg: Edema present.      Comments: S/p hip washout with dressing and drain in place, sanguinous output, wound vac in place   Skin:     General: Skin is warm and dry.   Neurological:      General: No focal deficit present.      Mental Status: She is alert.   Psychiatric:         Mood and Affect: Mood normal.         Behavior: Behavior normal.             Significant Labs: All pertinent labs within the past 24 hours have been reviewed.    Significant Imaging: I have reviewed all pertinent imaging results/findings within the past 24 hours.    Assessment/Plan:      * Staphylococcal arthritis of left hip  - see sepsis due to MRSA      Oral lesion  - painful gray vesicular lesions noted on tongue  - previously thought to be thrush and patient started on nystatin  - does not appear to be thrush on my exam given appearance  - started few days after starting bactrim  - concern for SJS given proximity to bactrim use versus HSV lesions, PCR indeterminate, HSV DNA detected but unable to distinguish 1 or 2  - viscous lidocaine ordered   - cont empiric valtrex    MRSA bacteremia  - Bcx+ MRSA, source likely left hip  - repeat Bcx NGTD  - no vegetation noted on ECHO  - cont vanc  - ID consulted for abx recs- cont vanc      Painful orthopaedic hardware  - septic hip joint with hardware in place  - ortho  following   - see sepsis 2/2 MRSA      (HFpEF) heart failure with preserved ejection fraction  Monitor volume status. Control HTN.   - TTE: Left Ventricle: The left ventricle is normal in size. Normal wall thickness. There is concentric remodeling. Normal wall motion. There is low normal systolic function with a visually estimated ejection fraction of 50 - 55%. There is normal diastolic function.    Right Ventricle: Normal right ventricular cavity size. Wall thickness is normal. Right ventricle wall motion  is normal. Systolic function is normal.    Aortic Valve: There is moderate aortic valve sclerosis. There is moderate annular calcification present.    Mitral Valve: There is severe mitral annular calcification present.    Aorta: Aortic root is normal in size measuring 3.37 cm. Ascending aorta is normal measuring 3.51 cm.    Pulmonary Artery: The estimated pulmonary artery systolic pressure is 25 mmHg.    IVC/SVC: Normal venous pressure at 3 mmHg.    Depression  Continue home Lexapro.        Atrial fibrillation with RVR  Patient has a history of atrial fibrillation and initially went into RVR at outside hospital.  Converted back to sinus rhythm with CCB and amiodarone infusions.  Cardiology was consulted and recommended continuing home amiodarone, metoprolol, and resuming home Eliquis (as patient was noncompliant).  We will monitor on telemetry       Sepsis due to methicillin resistant Staphylococcus aureus (MRSA)  Initially presented to outside hospital with complaints of left hip pain and inability to ambulate, and met sepsis criteria with tachycardia (AFib with RVR) and leukocytosis.  Source is likely left hip septic arthritis.    Blood cultures obtained there, and lactate only mildly elevated which resolved with fluids.    We will continue vancomycin and Zosyn for presumed septic arthritis and consult Orthopedics for further evaluation.  - S/P left hip aspiration with purulent drainage- culture + MRSA  - Bcx+  MRSA, repeat NGTD  - ECHO without vegetation  - ID consulted for antibiotic recs- recommend cont vanc, check dapto susceptibility - susceptible  - ortho following. S/p hip washout and debridement. Left hip proximal femoral resection with placement of antibiotic cement spacer, Removal of cephalomedullary nail left femur, Incision and drainage with irrigation deep abscess left buttock and thigh, Excisional debridement of fascia and muscle left hip. Drain and wound vac placed.   - place PICC line     Iron deficiency anemia  Chronic, and stable around baseline. Monitor.     Peripheral neuropathy  Continue home Lyrica.    Essential hypertension  Continue hospital formulary of home antihypertensives.     Chronic pain with uncomplicated opioid dependence  PDMP reviewed. Will continue pain control for septic arthritis.         VTE Risk Mitigation (From admission, onward)           Ordered     apixaban tablet 5 mg  2 times daily         01/19/24 1446     Reason for No Pharmacological VTE Prophylaxis  Once        Question:  Reasons:  Answer:  Already adequately anticoagulated on oral Anticoagulants    01/17/24 2037     IP VTE HIGH RISK PATIENT  Once         01/17/24 2037     Place sequential compression device  Until discontinued         01/17/24 2037                    Discharge Planning   BAILEY: 1/23/2024     Code Status: Full Code   Is the patient medically ready for discharge?:     Reason for patient still in hospital (select all that apply): Patient trending condition, Consult recommendations, and PT / OT recommendations  Discharge Plan A: Home with family, Home Health                  Dianne Vila MD  Department of Hospital Medicine   [facility] - Telemetry Stepdown

## 2024-01-21 NOTE — PROGRESS NOTES
Pharmacokinetic Assessment Follow Up: IV Vancomycin    Vancomycin serum concentration assessment(s):  - The vancomycin random level, drawn at ~ 19.75 hours after the last dose, was 23 mcg/mL which is above the goal level of 15-20 mcg/mL.   - Using 2-pt kinetics, she is clearing vancomycin at ~ 0.36 mcg/mL/hr.   - Her level is expected to be within the therapeutic range in ~ 12 hours and just below the therapeutic range in ~ 24 hours.  - Renal function appears to be stable at this time.    Vancomycin Regimen Plan:  - Will not re-dose at this time.   - Obtain another random vancomycin level on 1/21 @ 1730 to assess for re-dosing.   - Monitor for signs and symptoms of nephrotoxicity and infusion reactions.       Drug levels (last 3 results):  Recent Labs   Lab Result Units 01/18/24 2022 01/19/24  0129 01/20/24  2138 01/21/24  0532   Vancomycin, Random ug/mL  --  22.5  --  23.0   Vancomycin-Trough ug/mL 29.6*  --  25.9*  --        Pharmacy will continue to follow and monitor vancomycin.    Please contact pharmacy at extension [phone] for questions regarding this assessment.    Thank you for the consult,   Deepa Agosto       Patient brief summary:  Froilan Ray is a 78 y.o. female initiated on antimicrobial therapy with IV Vancomycin for treatment of bone/joint infection    The patient's current regimen is dose by levels.     Drug Allergies:   Review of patient's allergies indicates:  No Known Allergies    Actual Body Weight:   67 kg    Renal Function:   Estimated Creatinine Clearance: 56 mL/min (based on SCr of 0.8 mg/dL).,     CBC (last 72 hours):  Recent Labs   Lab Result Units 01/19/24  0443 01/20/24  0821 01/21/24  0532   WBC K/uL 18.65* 20.94* 14.92*   Hemoglobin g/dL 8.1* 9.3* 7.5*   Hematocrit % 26.7* 27.6* 23.4*   Platelets K/uL 696* 434 769*   Gran % % 85.8* 88.3* 83.7*   Lymph % % 5.3* 4.9* 7.4*   Mono % % 6.4 5.5 7.2   Eosinophil % % 0.5 0.0 0.0   Basophil % % 0.3 0.2 0.3   Differential Method   Automated Automated Automated       Metabolic Panel (last 72 hours):  Recent Labs   Lab Result Units 01/20/24  0821 01/21/24  0532   Sodium mmol/L 134* 132*   Potassium mmol/L 3.1* 3.2*   Chloride mmol/L 102 101   CO2 mmol/L 24 22*   Glucose mg/dL 109 97   BUN mg/dL 15 16   Creatinine mg/dL 0.8 0.8   Albumin g/dL 1.7* 1.6*   Total Bilirubin mg/dL 0.7 0.6   Alkaline Phosphatase U/L 176* 156*   AST U/L 27 38   ALT U/L 34 31       Vancomycin Administrations:  vancomycin given in the last 96 hours                     vancomycin 750 mg in dextrose 5 % (D5W) 250 mL IVPB (Vial-Mate) ()  Restarted 01/20/24 1114      Restarted  1039      Restarted  0952     750 mg New Bag  0946     750 mg New Bag 01/19/24 1852     750 mg New Bag  0651    vancomycin injection (g) 2 g Given 01/19/24 1240    vancomycin injection (g) 2 g Given 01/19/24 1235    vancomycin (VANCOCIN) 1,000 mg in dextrose 5 % (D5W) 250 mL IVPB (Vial-Mate) (mg) 1,000 mg New Bag 01/18/24 1320     1,000 mg New Bag 01/17/24 2157    vancomycin 750 mg in dextrose 5 % 250 mL IVPB (ready to mix) (mg) 750 mg New Bag 01/17/24 1050                    Microbiologic Results:  Microbiology Results (last 7 days)       Procedure Component Value Units Date/Time    Blood culture [[id number]] Collected: 01/18/24 1655    Order Status: Completed Specimen: Blood Updated: 01/20/24 2012     Blood Culture, Routine No Growth to date      No Growth to date      No Growth to date    Blood culture [[id number]] Collected: 01/18/24 1704    Order Status: Completed Specimen: Blood Updated: 01/20/24 2012     Blood Culture, Routine No Growth to date      No Growth to date      No Growth to date    Aerobic culture [[id number]]  (Abnormal) Collected: 01/18/24 1013    Order Status: Completed Specimen: Hip, Left Updated: 01/20/24 1215     Aerobic Bacterial Culture METHICILLIN RESISTANT STAPHYLOCOCCUS AUREUS  Many  For susceptibility see order #[id number]      Narrative:      Left hip joint aspiration     Aerobic culture [[id number]]  (Abnormal)  (Susceptibility) Collected: 01/18/24 1006    Order Status: Completed Specimen: Hip, Left Updated: 01/20/24 1212     Aerobic Bacterial Culture METHICILLIN RESISTANT STAPHYLOCOCCUS AUREUS  Many      Narrative:      LEFT Hip JOINT FLUID    Culture, Body Fluid - Bactec [[id number]]  (Abnormal) Collected: 01/18/24 1013    Order Status: Completed Specimen: Joint Fluid from Hip, Left Updated: 01/20/24 0759     Body Fluid Culture, Sterile Gram stain: Gram positive cocci in clusters resembling Staph      01/19/2024  02:09      STAPHYLOCOCCUS AUREUS  Susceptibility pending      Narrative:      Left hip joint aspiration    AFB Culture & Smear [[id number]] Collected: 01/18/24 1013    Order Status: Completed Specimen: Hip, Left Updated: 01/19/24 2128     AFB Culture & Smear Culture in progress     AFB CULTURE STAIN No acid fast bacilli seen.    Narrative:      Left hip joint aspiration    AFB Culture & Smear [[id number]] Collected: 01/18/24 1006    Order Status: Completed Specimen: Joint Fluid from Hip, Left Updated: 01/19/24 2128     AFB Culture & Smear Culture in progress     AFB CULTURE STAIN No acid fast bacilli seen.    Narrative:      LEFT Hip JOINT FLUID    Culture, Anaerobic [[id number]] Collected: 01/18/24 1006    Order Status: Completed Specimen: Joint Fluid from Hip, Left Updated: 01/19/24 0624     Anaerobic Culture Culture in progress    Narrative:      LEFT Hip JOINT FLUID    Culture, Anaerobe [[id number]] Collected: 01/18/24 1013    Order Status: Completed Specimen: Hip, Left Updated: 01/19/24 0624     Anaerobic Culture Culture in progress    Narrative:      Left hip joint aspiration    Blood culture [[id number]] Collected: 01/18/24 1656    Order Status: Canceled Specimen: Blood from Peripheral, Left Arm     Blood culture [[id number]] Collected: 01/18/24 1655    Order Status: Canceled Specimen: Blood     Gram stain [[id number]] Collected: 01/18/24 1013    Order Status:  Completed Specimen: Hip, Left Updated: 01/18/24 1413     Gram Stain Result Many WBC's      Many Gram positive cocci in clusters    Narrative:      Left hip aspiration    Gram stain [[id number]] Collected: 01/18/24 1006    Order Status: Completed Specimen: Joint Fluid from Hip, Left Updated: 01/18/24 1413     Gram Stain Result Many WBC's      Many Gram positive cocci in clusters    Narrative:      LEFT Hip JOINT FLUID    Fungus culture [[id number]] Collected: 01/18/24 1013    Order Status: Sent Specimen: Hip, Left Updated: 01/18/24 1226    Fungus culture [[id number]] Collected: 01/18/24 1006    Order Status: Sent Specimen: Joint Fluid from Hip, Left Updated: 01/18/24 1218

## 2024-01-21 NOTE — PT/OT/SLP EVAL
Physical Therapy Evaluation and Treatment     Patient Name:  Froilan Ray  MRN: [id number]    Admit Date: 1/17/2024  Admitting Diagnosis:  Staphylococcal arthritis of left hip  Length of Stay: 4 days  Recent Surgery: Procedure(s) (LRB):  ARTHROPLASTY, HIP, GIRDLESTONE, POSTERIOR APPROACH (Left)  IRRIGATION AND DEBRIDEMENT, LOWER EXTREMITY (Left) 2 Days Post-Op    Recommendations:     Discharge Recommendations: moderate therapy intensity  Equipment recommendations: none  Barriers to discharge: Increased level of skilled assistance required and Fall risk     Assessment:     Froilan Ray is a 78 y.o. female admitted to [facility] on 1/17/2024 with medical diagnosis of Staphylococcal arthritis of left hip. Pt presents with weakness, impaired endurance, impaired self care skills, impaired functional mobility, impaired balance, decreased coordination, decreased lower extremity function, pain, impaired skin, orthopedic precautions. These deficits effect their roles and responsibilities in which they were able to complete prior to admit.     Pt is agreeable to therapy evaluation and session. Pt on bed pan when therapist entered room; nsg at bedside to assist with pericare. PTA, pt was Mod(I) with RW at home; using SPC in community; electric scooter at grocery store. Pt has good support system with son and  at home. Pt requires significant assist during mobility. Once sitting eob, pt declined trialing stand or lateral scooting. Provided education that OOB mobility would help with healing process and discharge. Will continue to progress pt as tolerated.    Froilan Ray would benefit from acute PT intervention to improve quality of life, focus on recovery of impairments, provide patient/caregiver education, reduce fall risk, and maximize (I) and safety with functional mobility. Once medically stable, recommending pt discharge to moderate therapy intensity. Patient currently demonstrates a need for moderate  "intensity therapy on a daily basis post acute secondary to a decline in functional status due to surgical procedure  .      Rehab Prognosis: Fair    Plan:     During this hospitalization, patient to be seen 4 x/week to address the identified rehab impairments via gait training, therapeutic activities, therapeutic exercises, neuromuscular re-education and progress towards stated goals.     Plan of Care Expires:  02/20/24  Plan of Care reviewed with: patient    This plan of care has been discussed with the patient/caregiver, who was included in its development and is in agreement with the identified goals and treatment plan.     Subjective     Communicated with RN prior to session.  Patient found supine upon PT entry to room, agreeable to evaluation. Pt's  and son present during session.    Chief Complaint: L hip pain    Patient/Family Comments/goals: "Im not standing"    Pain/Comfort:  Pain Rating 1: 0/10  Location - Side 1: Left  Location - Orientation 1: generalized  Location 1: hip  Pain Addressed 1: Reposition, Distraction, Cessation of Activity  Pain Rating Post-Intervention 1: 10/10    Patients cultural, spiritual, [employer]ian conflicts given the current situation: None identified     Patient History: information obtained from pt     Living Environment: Pt lives with  and son in single level home  with ramp access. Bathroom set-up: walk-in shower  Prior Level of Function: modified (I) for mobility and ADLs using RW as AD at home; SPC in community  DME owned: rolling walker, single point cane, bedside commode, shower chair, wheelchair, and rollator  Support Available/Caregiver Assistance:   and son    Objective:     Patient found with: BEULAH drain, wound vac, PureWick, peripheral IV    Recent Surgery: Procedure(s) (LRB):  ARTHROPLASTY, HIP, GIRDLESTONE, POSTERIOR APPROACH (Left)  IRRIGATION AND DEBRIDEMENT, LOWER EXTREMITY (Left) 2 Days Post-Op  General Precautions: Standard, fall   Orthopedic " Precautions:LLE toe touch weight bearing   Braces:     Oxygen Device: room air      Exams:    Cognition:  Alert  Command following: Follows multistep verbal commands  Communication: clear/fluent    Sensation:   Light touch sensation: Pt reported generalized numbness/tingling of jimmy feet (chronic neuropathy)    Gross Motor Coordination: BRYN    Edema/Skin Integrity: None noted; Visible skin intact    Postural examination/scapula alignment: Rounded shoulder, Head forward, and Increased kyphosis    Lower Extremity Range of Motion:  Right Lower Extremity: WFL  Left Lower Extremity: Deficits: knee ext; knee flex; hip flex *d/t pain*    Lower Extremity Strength:  Right Lower Extremity:  grossly 3+/5  Left Lower Extremity: Deficits: knee ext: 2-/5, knee flex: 2-/5, hip flex: 1/5 *d/t pain*    Functional Mobility:    Bed Mobility:  Rolling to right with maximal assistance  Rolling to left with maximal assistance  Supine > Sit with moderate assistance  Sit > Supine with max assistance    Transfers:   Pt deferred OOB mobility    Balance:  Dynamic Sitting: FAIR+: Maintains balance through MINIMAL excursions of active trunk motion - 5 min    Outcome Measure: AM-PAC 6 CLICK MOBILITY  Total Score:8     Patient/Caregiver Education:     Therapist educated pt/caregiver regarding:   PT POC and goals for therapy   Safety with mobility and fall risk   Instruction on use of call button and importance of calling nursing staff for assistance with mobility   Time provided for therapeutic counseling and discussion of current health disposition. All questions answered to satisfaction, within scope of PT practice     Patient/caregiver able to verbalize understanding and expressed no further questions this visit; will follow-up with pt/caregiver during current admit for additional questions/concerns within scope of practice.     White board updated.     Patient left HOB elevated with all lines intact, call button in reach, and family  present.    GOALS:   Multidisciplinary Problems       Physical Therapy Goals          Problem: Physical Therapy    Goal Priority Disciplines Outcome Goal Variances Interventions   Physical Therapy Goal     PT, PT/OT Ongoing, Progressing     Description: Goals to be met by: 24     Patient will increase functional independence with mobility by performin. Supine to sit with Contact Guard Assistance  2. Sit to supine with Contact Guard Assistance  3. Sit to stand transfer with Minimal Assistance  4. Bed to chair transfer with Minimal Assistance using Rolling Walker  5. Gait  x 100 feet with Minimal Assistance using Rolling Walker.                            History:     Past Medical History:   Diagnosis Date    Anemia     Arthritis     Bleeding ulcer 2016    Chronic pain     DDD (degenerative disc disease), cervical     DDD (degenerative disc disease), lumbar     Depression     Disc degeneration, lumbosacral     Diverticulitis     Encounter for blood transfusion     Hypertension     IBS (irritable bowel syndrome)     Neuropathy of both feet     Seizures     none  since     Umbilical hernia        Past Surgical History:   Procedure Laterality Date    ARTHROPLASTY, HIP, GIRDLESTONE, POSTERIOR APPROACH Left 2024    Procedure: ARTHROPLASTY, HIP, GIRDLESTONE, POSTERIOR APPROACH;  Surgeon: Bulmaro Tellez MD;  Location: [address];  Service: Orthopedics;  Laterality: Left;    BACK SURGERY      BREAST BIOPSY      BREAST SURGERY Right     lumpectomy    CAUDAL EPIDURAL STEROID INJECTION N/A 2022    Procedure: Injection-steroid-epidural-caudal;  Surgeon: Luzmaria Marcelino MD;  Location: [facility] OR;  Service: Pain Management;  Laterality: N/A;    COLONOSCOPY N/A 2022    Procedure: COLONOSCOPY;  Surgeon: Abby Landers MD;  Location: [facility];  Service: Endoscopy;  Laterality: N/A;    ENDOSCOPIC ULTRASOUND OF UPPER GASTROINTESTINAL TRACT N/A 2020    Procedure: ULTRASOUND, UPPER  GI TRACT, ENDOSCOPIC;  Surgeon: Marcio Nguyễn III, MD;  Location: [facility] ENDO;  Service: Endoscopy;  Laterality: N/A;    ESOPHAGOGASTRODUODENOSCOPY N/A 01/14/2022    Procedure: EGD (ESOPHAGOGASTRODUODENOSCOPY);  Surgeon: Abby Landers MD;  Location: [facility] ENDO;  Service: Endoscopy;  Laterality: N/A;    ESOPHAGOGASTRODUODENOSCOPY N/A 06/10/2022    Procedure: EGD (ESOPHAGOGASTRODUODENOSCOPY);  Surgeon: Abby Landers MD;  Location: [facility] ENDO;  Service: Endoscopy;  Laterality: N/A;    EYE SURGERY      cataract    HYSTERECTOMY      INTRAMEDULLARY RODDING OF TROCHANTER OF FEMUR Left 12/11/2018    Procedure: INSERTION, INTRAMEDULLARY SANTOS, FEMUR, TROCHANTER;  Surgeon: Eulalio De La Cruz MD;  Location: [facility];  Service: Orthopedics;  Laterality: Left;    IRRIGATION AND DEBRIDEMENT OF LOWER EXTREMITY Left 1/19/2024    Procedure: IRRIGATION AND DEBRIDEMENT, LOWER EXTREMITY;  Surgeon: Bulmaro Tellez MD;  Location: [address];  Service: Orthopedics;  Laterality: Left;    REPAIR OF EPIGASTRIC HERNIA N/A 12/7/2023    Procedure: REPAIR, HERNIA, EPIGASTRIC;  Surgeon: Vipul Escobar MD;  Location: [facility];  Service: General;  Laterality: N/A;    SPINAL CORD STIMULATOR IMPLANT  09/18/2013    and removal    SPINE SURGERY  2006    lumbar L2-S1 decompression.    SPINE SURGERY      cervical decompression    TONSILLECTOMY         Time Tracking:     PT Received On: 01/21/24  PT Start Time: 1357     PT Stop Time: 1427  PT Total Time (min): 30 min     Billable Minutes: Evaluation 7, Therapeutic Activity 15, and Neuromuscular Re-education 8    01/21/2024

## 2024-01-21 NOTE — ASSESSMENT & PLAN NOTE
Froilan Ray is a 78 y.o. female w/PMH of HTN, HLD, CHF, chronic pain on opioids, peripheral neuropathy, L-intertroch fx s/p TFNA (12/11/2018, Dr. Eulalio De La Cruz), who presents as transfer from OSH with large left hip fluid collection and XR showing femoral head collapse with cut-through of the TFNA helical blade. Blood cx + for MRSA, on vanc/ceftaroline, ID consulted. Pt underwent IR aspiration of L hip on 1/18 with 72cc of juan pus aspirated, gram stain + for gram positive cocci in clusters.    » s/p nail removal and abx spacer 1/19    Pain control: MM  PT/OT:  TTWB LLE  DVT PPx: eliquis 5 bid for a fib, SCDs at all times when not ambulating  Abx:  Per ID; currently receiving vancomycin  Repeat blood cultures NGTD  PICC team consulted     » Dispo: f/u final ID recs

## 2024-01-22 LAB
ALBUMIN SERPL BCP-MCNC: 1.6 G/DL (ref 3.5–5.2)
ALP SERPL-CCNC: 132 U/L (ref 55–135)
ALT SERPL W/O P-5'-P-CCNC: 28 U/L (ref 10–44)
ANION GAP SERPL CALC-SCNC: 10 MMOL/L (ref 8–16)
AST SERPL-CCNC: 35 U/L (ref 10–40)
BACTERIA SPEC ANAEROBE CULT: NORMAL
BACTERIA SPEC ANAEROBE CULT: NORMAL
BASOPHILS # BLD AUTO: 0.04 K/UL (ref 0–0.2)
BASOPHILS NFR BLD: 0.3 % (ref 0–1.9)
BILIRUB SERPL-MCNC: 0.5 MG/DL (ref 0.1–1)
BLD PROD TYP BPU: NORMAL
BLD PROD TYP BPU: NORMAL
BLOOD UNIT EXPIRATION DATE: NORMAL
BLOOD UNIT EXPIRATION DATE: NORMAL
BLOOD UNIT TYPE CODE: 7300
BLOOD UNIT TYPE CODE: 7300
BLOOD UNIT TYPE: NORMAL
BLOOD UNIT TYPE: NORMAL
BUN SERPL-MCNC: 14 MG/DL (ref 8–23)
CALCIUM SERPL-MCNC: 9.2 MG/DL (ref 8.7–10.5)
CHLORIDE SERPL-SCNC: 104 MMOL/L (ref 95–110)
CO2 SERPL-SCNC: 20 MMOL/L (ref 23–29)
CODING SYSTEM: NORMAL
CODING SYSTEM: NORMAL
CREAT SERPL-MCNC: 0.7 MG/DL (ref 0.5–1.4)
CROSSMATCH INTERPRETATION: NORMAL
CROSSMATCH INTERPRETATION: NORMAL
DIFFERENTIAL METHOD BLD: ABNORMAL
DISPENSE STATUS: NORMAL
DISPENSE STATUS: NORMAL
EOSINOPHIL # BLD AUTO: 0 K/UL (ref 0–0.5)
EOSINOPHIL NFR BLD: 0 % (ref 0–8)
ERYTHROCYTE [DISTWIDTH] IN BLOOD BY AUTOMATED COUNT: 18.5 % (ref 11.5–14.5)
EST. GFR  (NO RACE VARIABLE): >60 ML/MIN/1.73 M^2
GLUCOSE SERPL-MCNC: 139 MG/DL (ref 70–110)
HCT VFR BLD AUTO: 23.5 % (ref 37–48.5)
HGB BLD-MCNC: 7.5 G/DL (ref 12–16)
IMM GRANULOCYTES # BLD AUTO: 0.16 K/UL (ref 0–0.04)
IMM GRANULOCYTES NFR BLD AUTO: 1.2 % (ref 0–0.5)
LYMPHOCYTES # BLD AUTO: 1.2 K/UL (ref 1–4.8)
LYMPHOCYTES NFR BLD: 9.1 % (ref 18–48)
MCH RBC QN AUTO: 26 PG (ref 27–31)
MCHC RBC AUTO-ENTMCNC: 31.9 G/DL (ref 32–36)
MCV RBC AUTO: 82 FL (ref 82–98)
MONOCYTES # BLD AUTO: 1 K/UL (ref 0.3–1)
MONOCYTES NFR BLD: 7.4 % (ref 4–15)
NEUTROPHILS # BLD AUTO: 11 K/UL (ref 1.8–7.7)
NEUTROPHILS NFR BLD: 82 % (ref 38–73)
NRBC BLD-RTO: 0 /100 WBC
NUM UNITS TRANS PACKED RBC: NORMAL
NUM UNITS TRANS PACKED RBC: NORMAL
PLATELET # BLD AUTO: 783 K/UL (ref 150–450)
PMV BLD AUTO: 9.5 FL (ref 9.2–12.9)
POTASSIUM SERPL-SCNC: 3.1 MMOL/L (ref 3.5–5.1)
PROT SERPL-MCNC: 5.7 G/DL (ref 6–8.4)
RBC # BLD AUTO: 2.88 M/UL (ref 4–5.4)
SODIUM SERPL-SCNC: 134 MMOL/L (ref 136–145)
VANCOMYCIN SERPL-MCNC: 10.2 UG/ML
WBC # BLD AUTO: 13.45 K/UL (ref 3.9–12.7)

## 2024-01-22 PROCEDURE — 25000003 PHARM REV CODE 250: Performed by: STUDENT IN AN ORGANIZED HEALTH CARE EDUCATION/TRAINING PROGRAM

## 2024-01-22 PROCEDURE — 05H333Z INSERTION OF INFUSION DEVICE INTO RIGHT INNOMINATE VEIN, PERCUTANEOUS APPROACH: ICD-10-PCS | Performed by: ORTHOPAEDIC SURGERY

## 2024-01-22 PROCEDURE — 36415 COLL VENOUS BLD VENIPUNCTURE: CPT | Performed by: STUDENT IN AN ORGANIZED HEALTH CARE EDUCATION/TRAINING PROGRAM

## 2024-01-22 PROCEDURE — A4216 STERILE WATER/SALINE, 10 ML: HCPCS | Performed by: STUDENT IN AN ORGANIZED HEALTH CARE EDUCATION/TRAINING PROGRAM

## 2024-01-22 PROCEDURE — C1751 CATH, INF, PER/CENT/MIDLINE: HCPCS

## 2024-01-22 PROCEDURE — 99233 SBSQ HOSP IP/OBS HIGH 50: CPT | Mod: ,,, | Performed by: INTERNAL MEDICINE

## 2024-01-22 PROCEDURE — 97530 THERAPEUTIC ACTIVITIES: CPT

## 2024-01-22 PROCEDURE — 27000207 HC ISOLATION

## 2024-01-22 PROCEDURE — 20600001 HC STEP DOWN PRIVATE ROOM

## 2024-01-22 PROCEDURE — 76937 US GUIDE VASCULAR ACCESS: CPT

## 2024-01-22 PROCEDURE — 97535 SELF CARE MNGMENT TRAINING: CPT

## 2024-01-22 PROCEDURE — 80053 COMPREHEN METABOLIC PANEL: CPT | Performed by: STUDENT IN AN ORGANIZED HEALTH CARE EDUCATION/TRAINING PROGRAM

## 2024-01-22 PROCEDURE — 80202 ASSAY OF VANCOMYCIN: CPT | Performed by: STUDENT IN AN ORGANIZED HEALTH CARE EDUCATION/TRAINING PROGRAM

## 2024-01-22 PROCEDURE — 36573 INSJ PICC RS&I 5 YR+: CPT

## 2024-01-22 PROCEDURE — 63600175 PHARM REV CODE 636 W HCPCS: Performed by: STUDENT IN AN ORGANIZED HEALTH CARE EDUCATION/TRAINING PROGRAM

## 2024-01-22 PROCEDURE — 85025 COMPLETE CBC W/AUTO DIFF WBC: CPT | Performed by: STUDENT IN AN ORGANIZED HEALTH CARE EDUCATION/TRAINING PROGRAM

## 2024-01-22 RX ORDER — POTASSIUM CHLORIDE 20 MEQ/1
40 TABLET, EXTENDED RELEASE ORAL 2 TIMES DAILY
Status: COMPLETED | OUTPATIENT
Start: 2024-01-22 | End: 2024-01-22

## 2024-01-22 RX ORDER — SODIUM CHLORIDE 0.9 % (FLUSH) 0.9 %
10 SYRINGE (ML) INJECTION
Status: DISCONTINUED | OUTPATIENT
Start: 2024-01-22 | End: 2024-01-29 | Stop reason: HOSPADM

## 2024-01-22 RX ORDER — SODIUM CHLORIDE 0.9 % (FLUSH) 0.9 %
10 SYRINGE (ML) INJECTION EVERY 6 HOURS
Status: DISCONTINUED | OUTPATIENT
Start: 2024-01-22 | End: 2024-01-29 | Stop reason: HOSPADM

## 2024-01-22 RX ADMIN — LIDOCAINE HYDROCHLORIDE 5 ML: 20 SOLUTION ORAL; TOPICAL at 10:01

## 2024-01-22 RX ADMIN — PREGABALIN 200 MG: 100 CAPSULE ORAL at 02:01

## 2024-01-22 RX ADMIN — LIDOCAINE HYDROCHLORIDE 5 ML: 20 SOLUTION ORAL; TOPICAL at 05:01

## 2024-01-22 RX ADMIN — OXYCODONE HYDROCHLORIDE 10 MG: 10 TABLET ORAL at 01:01

## 2024-01-22 RX ADMIN — Medication 10 ML: at 11:01

## 2024-01-22 RX ADMIN — OXYCODONE HYDROCHLORIDE 10 MG: 10 TABLET ORAL at 05:01

## 2024-01-22 RX ADMIN — APIXABAN 5 MG: 5 TABLET, FILM COATED ORAL at 09:01

## 2024-01-22 RX ADMIN — NYSTATIN 500000 UNITS: 100000 SUSPENSION ORAL at 08:01

## 2024-01-22 RX ADMIN — NYSTATIN 500000 UNITS: 100000 SUSPENSION ORAL at 05:01

## 2024-01-22 RX ADMIN — VANCOMYCIN HYDROCHLORIDE 1000 MG: 1 INJECTION, POWDER, LYOPHILIZED, FOR SOLUTION INTRAVENOUS at 08:01

## 2024-01-22 RX ADMIN — AMLODIPINE BESYLATE 5 MG: 5 TABLET ORAL at 09:01

## 2024-01-22 RX ADMIN — ESCITALOPRAM OXALATE 20 MG: 5 TABLET, FILM COATED ORAL at 09:01

## 2024-01-22 RX ADMIN — POTASSIUM CHLORIDE 40 MEQ: 1500 TABLET, EXTENDED RELEASE ORAL at 08:01

## 2024-01-22 RX ADMIN — LIDOCAINE HYDROCHLORIDE 5 ML: 20 SOLUTION ORAL; TOPICAL at 01:01

## 2024-01-22 RX ADMIN — PREGABALIN 200 MG: 100 CAPSULE ORAL at 08:01

## 2024-01-22 RX ADMIN — AMIODARONE HYDROCHLORIDE 200 MG: 200 TABLET ORAL at 09:01

## 2024-01-22 RX ADMIN — APIXABAN 5 MG: 5 TABLET, FILM COATED ORAL at 08:01

## 2024-01-22 RX ADMIN — PREGABALIN 200 MG: 100 CAPSULE ORAL at 09:01

## 2024-01-22 RX ADMIN — METOPROLOL TARTRATE 50 MG: 50 TABLET, FILM COATED ORAL at 08:01

## 2024-01-22 RX ADMIN — VALACYCLOVIR HYDROCHLORIDE 1000 MG: 500 TABLET, FILM COATED ORAL at 09:01

## 2024-01-22 RX ADMIN — POTASSIUM CHLORIDE 40 MEQ: 1500 TABLET, EXTENDED RELEASE ORAL at 09:01

## 2024-01-22 RX ADMIN — METHOCARBAMOL 500 MG: 500 TABLET ORAL at 09:01

## 2024-01-22 RX ADMIN — METHOCARBAMOL 500 MG: 500 TABLET ORAL at 02:01

## 2024-01-22 RX ADMIN — OXYCODONE HYDROCHLORIDE 10 MG: 10 TABLET ORAL at 09:01

## 2024-01-22 RX ADMIN — METHOCARBAMOL 500 MG: 500 TABLET ORAL at 08:01

## 2024-01-22 RX ADMIN — Medication 10 ML: at 05:01

## 2024-01-22 RX ADMIN — DOCUSATE SODIUM 100 MG: 100 CAPSULE, LIQUID FILLED ORAL at 09:01

## 2024-01-22 RX ADMIN — METOPROLOL TARTRATE 50 MG: 50 TABLET, FILM COATED ORAL at 09:01

## 2024-01-22 RX ADMIN — OXYCODONE HYDROCHLORIDE 10 MG: 10 TABLET ORAL at 07:01

## 2024-01-22 RX ADMIN — MUPIROCIN: 20 OINTMENT TOPICAL at 09:01

## 2024-01-22 RX ADMIN — NYSTATIN 500000 UNITS: 100000 SUSPENSION ORAL at 09:01

## 2024-01-22 RX ADMIN — VALACYCLOVIR HYDROCHLORIDE 1000 MG: 500 TABLET, FILM COATED ORAL at 08:01

## 2024-01-22 RX ADMIN — NYSTATIN 500000 UNITS: 100000 SUSPENSION ORAL at 12:01

## 2024-01-22 NOTE — SUBJECTIVE & OBJECTIVE
Interval History: Mouth pain worsening today.  Cont vatrex.     Repeat Bcx NGTD. PICC line placed  On vanc for MRSA- also susceptible to dapto   MRI lumbar ordered per ID recs.   Refusing SNF      Review of Systems   Constitutional:  Negative for fever.   HENT:  Positive for mouth sores and trouble swallowing.    Respiratory:  Negative for shortness of breath.    Cardiovascular:  Negative for chest pain.   Gastrointestinal:  Negative for abdominal pain.   Genitourinary:  Negative for dysuria.   Musculoskeletal:  Positive for gait problem.        Left hip pain   Psychiatric/Behavioral:  Negative for confusion.      Objective:     Vital Signs (Most Recent):  Temp: 99.7 °F (37.6 °C) (01/22/24 1516)  Pulse: 79 (01/22/24 1516)  Resp: 18 (01/22/24 1516)  BP: (!) 122/57 (01/22/24 1516)  SpO2: 97 % (01/22/24 1516) Vital Signs (24h Range):  Temp:  [98.1 °F (36.7 °C)-99.7 °F (37.6 °C)] 99.7 °F (37.6 °C)  Pulse:  [] 79  Resp:  [17-18] 18  SpO2:  [92 %-97 %] 97 %  BP: (118-146)/(56-75) 122/57     Weight: 63.9 kg (140 lb 14 oz)  Body mass index is 22.74 kg/m².    Intake/Output Summary (Last 24 hours) at 1/22/2024 1706  Last data filed at 1/22/2024 1200  Gross per 24 hour   Intake 890 ml   Output 90 ml   Net 800 ml           Physical Exam  Vitals and nursing note reviewed.   Constitutional:       General: She is not in acute distress.     Appearance: She is ill-appearing.   HENT:      Head: Normocephalic and atraumatic.      Nose: Nose normal.      Mouth/Throat:      Mouth: Mucous membranes are moist.      Comments: Gray vesicular lesions on tongue, and angular cheilitis noted. no white plaques inside mouth  Eyes:      Extraocular Movements: Extraocular movements intact.      Conjunctiva/sclera: Conjunctivae normal.      Pupils: Pupils are equal, round, and reactive to light.   Cardiovascular:      Rate and Rhythm: Normal rate and regular rhythm.      Pulses: Normal pulses.      Heart sounds: Normal heart sounds.    Pulmonary:      Effort: Pulmonary effort is normal.      Breath sounds: Normal breath sounds.   Abdominal:      General: Abdomen is flat. Bowel sounds are normal. There is no distension.      Palpations: Abdomen is soft.      Tenderness: There is no abdominal tenderness.   Musculoskeletal:      Cervical back: Normal range of motion and neck supple.      Left lower leg: Edema present.      Comments: S/p hip washout with dressing and drain in place, sanguinous output, wound vac in place   Skin:     General: Skin is warm and dry.   Neurological:      General: No focal deficit present.      Mental Status: She is alert.   Psychiatric:         Mood and Affect: Mood normal.         Behavior: Behavior normal.             Significant Labs: All pertinent labs within the past 24 hours have been reviewed.    Significant Imaging: I have reviewed all pertinent imaging results/findings within the past 24 hours.

## 2024-01-22 NOTE — SUBJECTIVE & OBJECTIVE
"Interval History: NAEO. Family member at bedside. Reports some R hip soreness, but denies new or worsening complaints. Thinks mouth lesion is getting better    Reports fall a few months ago when in the bathroom and increase in pain at L hip over past 3 weeks. Denies other hardware other than L hip. Reports sh is "always" in back pain.     Review of Systems   Constitutional:  Negative for chills and fever.   Gastrointestinal:  Negative for abdominal pain, constipation and diarrhea.   Genitourinary:  Negative for flank pain.   Musculoskeletal:  Positive for back pain.   Psychiatric/Behavioral:  Negative for confusion.    All other systems reviewed and are negative.    Objective:     Vital Signs (Most Recent):  Temp: 98.7 °F (37.1 °C) (01/22/24 1123)  Pulse: 80 (01/22/24 1123)  Resp: 18 (01/22/24 1123)  BP: (!) 118/56 (01/22/24 1123)  SpO2: 97 % (01/22/24 1123) Vital Signs (24h Range):  Temp:  [98.1 °F (36.7 °C)-99.3 °F (37.4 °C)] 98.7 °F (37.1 °C)  Pulse:  [] 80  Resp:  [16-18] 18  SpO2:  [92 %-97 %] 97 %  BP: (118-146)/(56-75) 118/56     Weight: 63.9 kg (140 lb 14 oz)  Body mass index is 22.74 kg/m².    Estimated Creatinine Clearance: 64 mL/min (based on SCr of 0.7 mg/dL).     Physical Exam  Vitals and nursing note reviewed.   Constitutional:       General: She is not in acute distress.     Appearance: She is not ill-appearing, toxic-appearing or diaphoretic.   Eyes:      Pupils: Pupils are equal, round, and reactive to light.   Cardiovascular:      Rate and Rhythm: Normal rate and regular rhythm.      Heart sounds: No murmur heard.  Pulmonary:      Effort: Pulmonary effort is normal.      Breath sounds: Normal breath sounds.   Musculoskeletal:      Comments: L hip dressings c/d/I  L>>R leg swelling   Skin:     Findings: No erythema.   Neurological:      General: No focal deficit present.      Mental Status: She is alert and oriented to person, place, and time.   Psychiatric:         Mood and Affect: Mood " normal.         Behavior: Behavior normal.         Thought Content: Thought content normal.         Judgment: Judgment normal.          Significant Labs: Blood Culture:   Recent Labs   Lab 01/16/24  1413 01/18/24  1655 01/18/24  1704   LABBLOO No growth after 5 days.  Gram stain aer bottle:  Gram positive cocci  Results called to and read back by:  Romaine Doan RN ER 1/17/24 @ 2733 JLR  METHICILLIN RESISTANT STAPHYLOCOCCUS AUREUS  Known MRSA patient  * No Growth to date  No Growth to date  No Growth to date  No Growth to date No Growth to date  No Growth to date  No Growth to date  No Growth to date       BMP:   Recent Labs   Lab 01/22/24  0449   *   *   K 3.1*      CO2 20*   BUN 14   CREATININE 0.7   CALCIUM 9.2       CBC:   Recent Labs   Lab 01/21/24  0532 01/22/24  0449   WBC 14.92* 13.45*   HGB 7.5* 7.5*   HCT 23.4* 23.5*   * 783*       Wound Culture:   Recent Labs   Lab 01/18/24  1006 01/18/24  1013   LABAERO METHICILLIN RESISTANT STAPHYLOCOCCUS AUREUS  Many  * METHICILLIN RESISTANT STAPHYLOCOCCUS AUREUS  Many  For susceptibility see order #[id number]  *         Significant Imaging: I have reviewed all pertinent imaging results/findings within the past 24 hours.

## 2024-01-22 NOTE — PROGRESS NOTES
"[facility] - Telemetry [facility] Medicine  Progress Note    Patient Name: Froilan Ray  MRN: [id number]  Patient Class: IP- Inpatient   Admission Date: 1/17/2024  Length of Stay: 5 days  Attending Physician: Dianne Vila MD  Primary Care Provider: Alphonse Boothe III, MD        Subjective:     Principal Problem:Staphylococcal arthritis of left hip        HPI:  Froilan Ray is a 78 y.o. female with history of L hip surgery, HTN, HLD, CHF, chronic pain on opioids, peripheral neuropathy who was transferred from [facility] for concerns for septic joint.     Patient is a poor historian regarding timing and characterization of her symptoms. She reports that for "a long time" she has been "unable to do anything," basically lying in bed most of the day as she is unable to bear weight on her L leg due to hip pain. She reports chronic hip pain for which she takes "2 Percocets every 6 hours," however this has not relieved her pain for at least the past 2-3 weeks. She denies any fever, n/v, diarrhea, or other systemic symptoms except dry mouth.     At the OSH, she met sepsis criteria with leukocytosis and tachycardia. CT showed fluid around her L hip previous surgical site, concerning for septic arthritis. Was in AFib with RVR, and converted to sinus rhythm with cardizem and amiodarone infusions. She was given antibiotics and transferred here for Ortho evaluation.     Overview/Hospital Course:  Patient admitted for septic hip. S/p aspiration with purulent fluid removal, culture +Staph. Bcx+ MRSA. Started on vanc/zosyn- deescalated to vanc. Ortho consulted and following. S/p hip washout and debridement with Left hip proximal femoral resection with placement of antibiotic cement spacer, Removal of cephalomedullary nail left femur, Incision and drainage with irrigation deep abscess left buttock and thigh, Excisional debridement of fascia and muscle left hip. Drain and wound vac placed.   Noted painful lesions on " tongue after starting bactrim considering SJS vs HSV as lesions appear vesicular. Viscous lidocaine for mouth pain. Started empiric valtrex. HSV PCR indeterminate, HSV DNA detected but couldn't differentiate between 1 and 2. Cont valtrex.  ID consulted for abx recs, cont vanc. Repeat Bcx NGTD.  PICC placed. Recommending MRI lumbar, ordered  PT/OT consulted. Patient refusing SNF.     Interval History: Mouth pain worsening today.  Cont vatrex.     Repeat Bcx NGTD. PICC line placed  On vanc for MRSA- also susceptible to dapto   MRI lumbar ordered per ID recs.   Refusing SNF      Review of Systems   Constitutional:  Negative for fever.   HENT:  Positive for mouth sores and trouble swallowing.    Respiratory:  Negative for shortness of breath.    Cardiovascular:  Negative for chest pain.   Gastrointestinal:  Negative for abdominal pain.   Genitourinary:  Negative for dysuria.   Musculoskeletal:  Positive for gait problem.        Left hip pain   Psychiatric/Behavioral:  Negative for confusion.      Objective:     Vital Signs (Most Recent):  Temp: 99.7 °F (37.6 °C) (01/22/24 1516)  Pulse: 79 (01/22/24 1516)  Resp: 18 (01/22/24 1516)  BP: (!) 122/57 (01/22/24 1516)  SpO2: 97 % (01/22/24 1516) Vital Signs (24h Range):  Temp:  [98.1 °F (36.7 °C)-99.7 °F (37.6 °C)] 99.7 °F (37.6 °C)  Pulse:  [] 79  Resp:  [17-18] 18  SpO2:  [92 %-97 %] 97 %  BP: (118-146)/(56-75) 122/57     Weight: 63.9 kg (140 lb 14 oz)  Body mass index is 22.74 kg/m².    Intake/Output Summary (Last 24 hours) at 1/22/2024 1706  Last data filed at 1/22/2024 1200  Gross per 24 hour   Intake 890 ml   Output 90 ml   Net 800 ml           Physical Exam  Vitals and nursing note reviewed.   Constitutional:       General: She is not in acute distress.     Appearance: She is ill-appearing.   HENT:      Head: Normocephalic and atraumatic.      Nose: Nose normal.      Mouth/Throat:      Mouth: Mucous membranes are moist.      Comments: Gray vesicular lesions on  tongue, and angular cheilitis noted. no white plaques inside mouth  Eyes:      Extraocular Movements: Extraocular movements intact.      Conjunctiva/sclera: Conjunctivae normal.      Pupils: Pupils are equal, round, and reactive to light.   Cardiovascular:      Rate and Rhythm: Normal rate and regular rhythm.      Pulses: Normal pulses.      Heart sounds: Normal heart sounds.   Pulmonary:      Effort: Pulmonary effort is normal.      Breath sounds: Normal breath sounds.   Abdominal:      General: Abdomen is flat. Bowel sounds are normal. There is no distension.      Palpations: Abdomen is soft.      Tenderness: There is no abdominal tenderness.   Musculoskeletal:      Cervical back: Normal range of motion and neck supple.      Left lower leg: Edema present.      Comments: S/p hip washout with dressing and drain in place, sanguinous output, wound vac in place   Skin:     General: Skin is warm and dry.   Neurological:      General: No focal deficit present.      Mental Status: She is alert.   Psychiatric:         Mood and Affect: Mood normal.         Behavior: Behavior normal.             Significant Labs: All pertinent labs within the past 24 hours have been reviewed.    Significant Imaging: I have reviewed all pertinent imaging results/findings within the past 24 hours.    Assessment/Plan:      * Staphylococcal arthritis of left hip  - see sepsis due to MRSA      Oral lesion  - painful gray vesicular lesions noted on tongue  - previously thought to be thrush and patient started on nystatin  - does not appear to be thrush on my exam given appearance  - started few days after starting bactrim  - concern for SJS given proximity to bactrim use versus HSV lesions, PCR indeterminate, HSV DNA detected but unable to distinguish 1 or 2  - viscous lidocaine ordered   - cont empiric valtrex    MRSA bacteremia  - Bcx+ MRSA, source likely left hip  - repeat Bcx NGTD  - no vegetation noted on ECHO  - cont vanc  - ID consulted for  abx recs- cont vanc  - MRI lumbar ordered per ID recs      Painful orthopaedic hardware  - septic hip joint with hardware in place  - ortho following   - see sepsis 2/2 MRSA      (HFpEF) heart failure with preserved ejection fraction  Monitor volume status. Control HTN.   - TTE: Left Ventricle: The left ventricle is normal in size. Normal wall thickness. There is concentric remodeling. Normal wall motion. There is low normal systolic function with a visually estimated ejection fraction of 50 - 55%. There is normal diastolic function.    Right Ventricle: Normal right ventricular cavity size. Wall thickness is normal. Right ventricle wall motion  is normal. Systolic function is normal.    Aortic Valve: There is moderate aortic valve sclerosis. There is moderate annular calcification present.    Mitral Valve: There is severe mitral annular calcification present.    Aorta: Aortic root is normal in size measuring 3.37 cm. Ascending aorta is normal measuring 3.51 cm.    Pulmonary Artery: The estimated pulmonary artery systolic pressure is 25 mmHg.    IVC/SVC: Normal venous pressure at 3 mmHg.    Depression  Continue home Lexapro.        Atrial fibrillation with RVR  Patient has a history of atrial fibrillation and initially went into RVR at outside hospital.  Converted back to sinus rhythm with CCB and amiodarone infusions.  Cardiology was consulted and recommended continuing home amiodarone, metoprolol, and resuming home Eliquis (as patient was noncompliant).  We will monitor on telemetry       Sepsis due to methicillin resistant Staphylococcus aureus (MRSA)  Initially presented to outside hospital with complaints of left hip pain and inability to ambulate, and met sepsis criteria with tachycardia (AFib with RVR) and leukocytosis.  Source is likely left hip septic arthritis.    Blood cultures obtained there, and lactate only mildly elevated which resolved with fluids.    We will continue vancomycin and Zosyn for presumed  septic arthritis and consult Orthopedics for further evaluation.  - S/P left hip aspiration with purulent drainage- culture + MRSA  - Bcx+ MRSA, repeat NGTD  - ECHO without vegetation  - ID consulted for antibiotic recs- recommend cont vanc, check dapto susceptibility - susceptible  - ortho following. S/p hip washout and debridement. Left hip proximal femoral resection with placement of antibiotic cement spacer, Removal of cephalomedullary nail left femur, Incision and drainage with irrigation deep abscess left buttock and thigh, Excisional debridement of fascia and muscle left hip. Drain and wound vac placed.   - s/p PICC line placement  - MRI lumbar ordered per ID recs    Iron deficiency anemia  Chronic, and stable around baseline. Monitor.     Peripheral neuropathy  Continue home Lyrica.    Essential hypertension  Continue hospital formulary of home antihypertensives.     Chronic pain with uncomplicated opioid dependence  PDMP reviewed. Will continue pain control for septic arthritis.         VTE Risk Mitigation (From admission, onward)           Ordered     apixaban tablet 5 mg  2 times daily         01/19/24 1446     Reason for No Pharmacological VTE Prophylaxis  Once        Question:  Reasons:  Answer:  Already adequately anticoagulated on oral Anticoagulants    01/17/24 2037     IP VTE HIGH RISK PATIENT  Once         01/17/24 2037     Place sequential compression device  Until discontinued         01/17/24 2037                    Discharge Planning   BAILEY: 1/24/2024     Code Status: Full Code   Is the patient medically ready for discharge?:     Reason for patient still in hospital (select all that apply): Patient trending condition and Consult recommendations  Discharge Plan A: Home with family, Home Health                  Dianne Vila MD  Department of Hospital Medicine   [facility] - Telemetry Stepdown

## 2024-01-22 NOTE — CONSULTS
"[facility] - Telemetry Stepdown  Infectious Disease  Consult Note    Patient Name: Froilan Ray  MRN: [id number]  Admission Date: 1/17/2024  Hospital Length of Stay: 5 days  Attending Physician: Dianne Vila MD  Primary Care Provider: Alphonse Boothe III, MD     Isolation Status: Contact    Patient information was obtained from patient and ER records.      Consults  Assessment/Plan:     ID  MRSA bacteremia  78F with h/o HTN, CHF admitted 1/17 as transfer from [facility] for concern for septic L hip joint. Taken to OR 1/19 for: Left hip proximal femoral resection with placement of antibiotic cement spacer, Removal of cephalomedullary nail left femur, Incision and drainage with irrigation deep abscess left buttock and thigh, Excisional debridement of fascia and muscle left hip. Bcx 1/16 with MRSA; repeat drawn 1/18 NGTD. 2d echo- adequate study, no veg. Currently on vanc. HSV pcr sent of lip blister- positive. ID consulted for "painful tongue lesions, HSV?, painful oral blisters after starting bactrim, SJS?, antibiotic recs for septic joint currently on Vanc     Likely buttock/gluteal abscess, necessitating to femoral head. Appreciate ortho assistance with source control of infection. Case c/b indwelling hardware    Recommendations:   - continue vancomycin, pharmacy dosing   -  needs lumbar spine imaging to r/o metastatic infection, ideally MRI   - anticipating 6 weeks IV abx with possible po abx suppression afterwards  - continue valtrex          Thank you for your consult. I will follow-up with patient. Please contact us if you have any additional questions.    Kika Armstrong MD  Infectious Disease  [facility] - Telemetry Stepdown    Subjective:     Principal Problem: Staphylococcal arthritis of left hip    HPI: 78F with h/o HTN, CHF admitted 1/17 as transfer from [facility] for concern for septic joint. Per h&p, pt had reported unable to bear weight on L leg due to hip pain and +fevers. CT showed fluid around her L hip " previous surgical site, concerning for septic arthritis. Was in AFib with RVR, and converted to sinus rhythm with cardizem and amiodarone infusions. She was given antibiotics and transferred here for Ortho evaluation.     Taken to OR today 1/19 for: Left hip proximal femoral resection with placement of antibiotic cement spacer                          Removal of cephalomedullary nail left femur  Incision and drainage with irrigation deep abscess left buttock and thigh  Excisional debridement of fascia and muscle left hip, 100 sq cm - 84671, 01/01/2004 6 times 4      Pt just out of surgery and is sedated. Unable to answer any questions    Bcx 1/16 with MRSA; repeat drawn 1/18 NGTD pending    Methicillin resistant Staphylococcus aureus       CULTURE, BLOOD     Ceftriaxone >32 mcg/mL Resistant     Clindamycin <=0.5 mcg/mL Sensitive     Erythromycin >4 mcg/mL Resistant     Oxacillin >2 mcg/mL Resistant     Penicillin >8 mcg/mL Resistant     Tetracycline <=4 mcg/mL Sensitive     Trimeth/Sulfa >2/38 mcg/mL Resistant     Vancomycin 1 mcg/mL Sensitive        Hip aspirated 1/18  Aerobic Bacterial Culture      Abnormal   STAPHYLOCOCCUS AUREUS  Many  For susceptibility see order #[id number]  P      Resulting Agency [facility]              Narrative  Performed by: [facility]  Left hip joint aspiration             OR cultures sent today, pending        2d echo    Left Ventricle: The left ventricle is normal in size. Normal wall thickness. There is concentric remodeling. Normal wall motion. There is low normal systolic function with a visually estimated ejection fraction of 50 - 55%. There is normal diastolic function.    Right Ventricle: Normal right ventricular cavity size. Wall thickness is normal. Right ventricle wall motion  is normal. Systolic function is normal.    Aortic Valve: There is moderate aortic valve sclerosis. There is moderate annular calcification present.    Mitral Valve: There is severe mitral annular calcification  "present.    Aorta: Aortic root is normal in size measuring 3.37 cm. Ascending aorta is normal measuring 3.51 cm.    Pulmonary Artery: The estimated pulmonary artery systolic pressure is 25 mmHg.    IVC/SVC: Normal venous pressure at 3 mmHg.         Currently on vanc/zosyn    HSV pcr sent of lip blister- pending    ID consulted for "painful tongue lesions, HSV?, painful oral blisters after starting bactrim, SJS?, antibiotic recs for septic joint currently on Vanc     Interval History: NAEO. Family member at bedside. Reports some R hip soreness, but denies new or worsening complaints. Thinks mouth lesion is getting better    Reports fall a few months ago when in the bathroom and increase in pain at L hip over past 3 weeks. Denies other hardware other than L hip. Reports sh is "always" in back pain.     Review of Systems   Constitutional:  Negative for chills and fever.   Gastrointestinal:  Negative for abdominal pain, constipation and diarrhea.   Genitourinary:  Negative for flank pain.   Musculoskeletal:  Positive for back pain.   Psychiatric/Behavioral:  Negative for confusion.    All other systems reviewed and are negative.    Objective:     Vital Signs (Most Recent):  Temp: 98.7 °F (37.1 °C) (01/22/24 1123)  Pulse: 80 (01/22/24 1123)  Resp: 18 (01/22/24 1123)  BP: (!) 118/56 (01/22/24 1123)  SpO2: 97 % (01/22/24 1123) Vital Signs (24h Range):  Temp:  [98.1 °F (36.7 °C)-99.3 °F (37.4 °C)] 98.7 °F (37.1 °C)  Pulse:  [] 80  Resp:  [16-18] 18  SpO2:  [92 %-97 %] 97 %  BP: (118-146)/(56-75) 118/56     Weight: 63.9 kg (140 lb 14 oz)  Body mass index is 22.74 kg/m².    Estimated Creatinine Clearance: 64 mL/min (based on SCr of 0.7 mg/dL).     Physical Exam  Vitals and nursing note reviewed.   Constitutional:       General: She is not in acute distress.     Appearance: She is not ill-appearing, toxic-appearing or diaphoretic.   Eyes:      Pupils: Pupils are equal, round, and reactive to light.   Cardiovascular:      " Rate and Rhythm: Normal rate and regular rhythm.      Heart sounds: No murmur heard.  Pulmonary:      Effort: Pulmonary effort is normal.      Breath sounds: Normal breath sounds.   Musculoskeletal:      Comments: L hip dressings c/d/I  L>>R leg swelling   Skin:     Findings: No erythema.   Neurological:      General: No focal deficit present.      Mental Status: She is alert and oriented to person, place, and time.   Psychiatric:         Mood and Affect: Mood normal.         Behavior: Behavior normal.         Thought Content: Thought content normal.         Judgment: Judgment normal.          Significant Labs: Blood Culture:   Recent Labs   Lab 01/16/24  1413 01/18/24  1655 01/18/24  1704   LABBLOO No growth after 5 days.  Gram stain aer bottle:  Gram positive cocci  Results called to and read back by:  Romaine Doan RN ER 1/17/24 @ 8546 JLR  METHICILLIN RESISTANT STAPHYLOCOCCUS AUREUS  Known MRSA patient  * No Growth to date  No Growth to date  No Growth to date  No Growth to date No Growth to date  No Growth to date  No Growth to date  No Growth to date       BMP:   Recent Labs   Lab 01/22/24  0449   *   *   K 3.1*      CO2 20*   BUN 14   CREATININE 0.7   CALCIUM 9.2       CBC:   Recent Labs   Lab 01/21/24  0532 01/22/24  0449   WBC 14.92* 13.45*   HGB 7.5* 7.5*   HCT 23.4* 23.5*   * 783*       Wound Culture:   Recent Labs   Lab 01/18/24  1006 01/18/24  1013   LABAERO METHICILLIN RESISTANT STAPHYLOCOCCUS AUREUS  Many  * METHICILLIN RESISTANT STAPHYLOCOCCUS AUREUS  Many  For susceptibility see order #[id number]  *         Significant Imaging: I have reviewed all pertinent imaging results/findings within the past 24 hours.

## 2024-01-22 NOTE — ASSESSMENT & PLAN NOTE
"78F with h/o HTN, CHF admitted 1/17 as transfer from [facility] for concern for septic L hip joint. Taken to OR 1/19 for: Left hip proximal femoral resection with placement of antibiotic cement spacer, Removal of cephalomedullary nail left femur, Incision and drainage with irrigation deep abscess left buttock and thigh, Excisional debridement of fascia and muscle left hip. Bcx 1/16 with MRSA; repeat drawn 1/18 NGTD. 2d echo- adequate study, no veg. Currently on vanc. HSV pcr sent of lip blister- positive. ID consulted for "painful tongue lesions, HSV?, painful oral blisters after starting bactrim, SJS?, antibiotic recs for septic joint currently on Vanc     Likely buttock/gluteal abscess, necessitating to femoral head. Appreciate ortho assistance with source control of infection. Case c/b indwelling hardware    Recommendations:   - continue vancomycin, pharmacy dosing   -  needs lumbar spine imaging to r/o metastatic infection, ideally MRI   - anticipating 6 weeks IV abx with possible po abx suppression afterwards  - continue valtrex    "

## 2024-01-22 NOTE — ASSESSMENT & PLAN NOTE
Froilan Ray is a 78 y.o. female w/PMH of HTN, HLD, CHF, chronic pain on opioids, peripheral neuropathy, L-intertroch fx s/p TFNA (12/11/2018, Dr. Eulalio De La Cruz), who presents as transfer from OSH with large left hip fluid collection and XR showing femoral head collapse with cut-through of the TFNA helical blade. Blood cx + for MRSA, on vanc/ceftaroline, ID consulted. Pt underwent IR aspiration of L hip on 1/18 with 72cc of juan pus aspirated, gram stain + for gram positive cocci in clusters.    » s/p nail removal and abx spacer 1/19    Pain control: MM  PT/OT:  TTWB LLE  DVT PPx: eliquis 5 bid for a fib, SCDs at all times when not ambulating  Abx:  Per ID; currently receiving vancomycin  Cx: hip aspiration cx growing MRSA   Repeat blood cultures NGTD  PICC team consulted     » Dispo: pending final ID recs, drain removal

## 2024-01-22 NOTE — ANESTHESIA POSTPROCEDURE EVALUATION
Anesthesia Post Evaluation    Patient: Froilan Ray    Procedure(s) Performed: Procedure(s) (LRB):  ARTHROPLASTY, HIP, GIRDLESTONE, POSTERIOR APPROACH (Left)  IRRIGATION AND DEBRIDEMENT, LOWER EXTREMITY (Left)    Final Anesthesia Type: general      Patient location during evaluation: PACU  Patient participation: Yes- Able to Participate  Level of consciousness: awake and alert  Post-procedure vital signs: reviewed and stable  Pain management: adequate  Airway patency: patent    PONV status at discharge: No PONV  Anesthetic complications: no      Cardiovascular status: blood pressure returned to baseline  Respiratory status: unassisted  Hydration status: euvolemic  Follow-up not needed.              Vitals Value Taken Time   /75 01/22/24 0753   Temp 36.7 °C (98.1 °F) 01/22/24 0753   Pulse 94 01/22/24 0753   Resp 18 01/22/24 0753   SpO2 93 % 01/22/24 0753         Event Time   Out of Recovery 01/19/2024 15:15:00         Pain/Duarte Score: Pain Rating Prior to Med Admin: 9 (1/22/2024  5:23 AM)  Pain Rating Post Med Admin: 3 (1/22/2024  1:46 AM)

## 2024-01-22 NOTE — PLAN OF CARE
Problem: Adult Inpatient Plan of Care  Goal: Plan of Care Review  Outcome: Ongoing, Progressing  Goal: Patient-Specific Goal (Individualized)  Outcome: Ongoing, Progressing  Goal: Absence of Hospital-Acquired Illness or Injury  Outcome: Ongoing, Progressing  Goal: Optimal Comfort and Wellbeing  Outcome: Ongoing, Progressing  Goal: Readiness for Transition of Care  Outcome: Ongoing, Progressing     Problem: Adjustment to Illness (Sepsis/Septic Shock)  Goal: Optimal Coping  Outcome: Ongoing, Progressing     Problem: Bleeding (Sepsis/Septic Shock)  Goal: Absence of Bleeding  Outcome: Ongoing, Progressing     Problem: Glycemic Control Impaired (Sepsis/Septic Shock)  Goal: Blood Glucose Level Within Desired Range  Outcome: Ongoing, Progressing     Problem: Infection Progression (Sepsis/Septic Shock)  Goal: Absence of Infection Signs and Symptoms  Outcome: Ongoing, Progressing     Problem: Nutrition Impaired (Sepsis/Septic Shock)  Goal: Optimal Nutrition Intake  Outcome: Ongoing, Progressing     Problem: Fluid and Electrolyte Imbalance (Acute Kidney Injury/Impairment)  Goal: Fluid and Electrolyte Balance  Outcome: Ongoing, Progressing     Problem: Oral Intake Inadequate (Acute Kidney Injury/Impairment)  Goal: Optimal Nutrition Intake  Outcome: Ongoing, Progressing     Problem: Renal Function Impairment (Acute Kidney Injury/Impairment)  Goal: Effective Renal Function  Outcome: Ongoing, Progressing     Problem: Skin Injury Risk Increased  Goal: Skin Health and Integrity  Outcome: Ongoing, Progressing     Problem: Fall Injury Risk  Goal: Absence of Fall and Fall-Related Injury  Outcome: Ongoing, Progressing     Problem: Infection  Goal: Absence of Infection Signs and Symptoms  Outcome: Ongoing, Progressing

## 2024-01-22 NOTE — PT/OT/SLP PROGRESS
Physical Therapy Co-Treatment  Co-treatment with OT for maximal pt participation, safety, and activity tolerance    Patient Name:  Froilan Ray   MRN:  [id number]  Admitting Diagnosis:  Staphylococcal arthritis of left hip   Recent Surgery: Procedure(s) (LRB):  ARTHROPLASTY, HIP, GIRDLESTONE, POSTERIOR APPROACH (Left)  IRRIGATION AND DEBRIDEMENT, LOWER EXTREMITY (Left) 3 Days Post-Op  Admit Date: 1/17/2024  Length of Stay: 5 days    Recommendations:     Discharge Recommendations:  Moderate Intensity Therapy  Discharge Equipment Recommendations: to be determined by next level of care   Justification for Equipment: N/A  Barriers to discharge: Evolving Clinical Presentation    Assessment:     Froilan Ray is a 78 y.o. female admitted with a medical diagnosis of Staphylococcal arthritis of left hip.  She presents with the following impairments/functional limitations:  weakness, impaired endurance, impaired functional mobility, impaired self care skills, impaired balance, gait instability, pain, decreased safety awareness, decreased lower extremity function, orthopedic precautions.     Pt agreeable to therapy, highly reactive to pain, screams out with any movement even when not of the affected leg, suspect high anxiety. Pt with improved bed mobility and able to transfer to chair today with maximal assistance, continue to stress need for OOB mobility    Rehab Prognosis: Fair; patient would benefit from acute skilled PT services to address these deficits and reach maximum level of function.    Recent Surgery: Procedure(s) (LRB):  ARTHROPLASTY, HIP, GIRDLESTONE, POSTERIOR APPROACH (Left)  IRRIGATION AND DEBRIDEMENT, LOWER EXTREMITY (Left) 3 Days Post-Op    Treatment Tolerated: Fair    Highest level of mobility achieved this visit: squat pivot to bedside chair with max A    Activity with RN/PCT: transfer with 2 person assist    Plan:     During this hospitalization, patient to be seen 4 x/week to address the  "identified rehab impairments via gait training, therapeutic activities, therapeutic exercises, neuromuscular re-education and progress toward the following goals:    Plan of Care Expires:  02/20/24    Subjective     RN Shelly notified prior to session. No family present upon PT entrance into room.    Chief Complaint: pain  Patient/Family Comments/goals: "I have to pee now"  Pain/Comfort:  Pain Rating 1: 10/10  Location - Side 1: Left  Location - Orientation 1: generalized  Location 1: foot  Pain Addressed 1: Pre-medicate for activity, Reposition, Distraction      Objective:     Additional staff present: OT Julia    Patient found HOB elevated with: BEULAH drain, PureWick, wound vac   Cognition:   Resistant to movement due to pain, anxious  Command following: Follows one-step verbal commands  Fluency: clear/fluent  General Precautions: Standard, fall   Orthopedic Precautions:LLE toe touch weight bearing   Braces: N/A   Body mass index is 22.74 kg/m².  Oxygen Device: Room Air    Vitals: BP (!) 146/75 (BP Location: Right arm, Patient Position: Lying)   Pulse 94   Temp 98.1 °F (36.7 °C) (Oral)   Resp 18   Ht 5' 6" (1.676 m)   Wt 63.9 kg (140 lb 14 oz)   SpO2 (!) 93%   BMI 22.74 kg/m²     Outcome Measures:  AM-PAC 6 CLICK MOBILITY  Turning over in bed (including adjusting bedclothes, sheets and blankets)?: 2  Sitting down on and standing up from a chair with arms (e.g., wheelchair, bedside commode, etc.): 1  Moving from lying on back to sitting on the side of the bed?: 2  Moving to and from a bed to a chair (including a wheelchair)?: 2  Need to walk in hospital room?: 1  Climbing 3-5 steps with a railing?: 1  Basic Mobility Total Score: 9     Functional Mobility:    Bed Mobility:   Supine to Sit: moderate assistance; from L side of bed  Scooting anteriorly to EOB to have both feet planted on floor: minimum assistance    Sitting Balance at Edge of Bed:  Assistance Level Required: Stand-by Assistance  Time: 5 " min  Postural deviations noted: no deviations noted    Transfers:   Sit <> Stand Transfer: deferred as patient unable to maintain TTWB in standing   Bed <> Chair Transfer: Squat Pivot technique with maximal assistance with no assistive device  Chair on patient's R    Education:  Time provided for education, counseling and discussion of health disposition in regards to patient's current status  All questions answered within PT scope of practice and to patient's satisfaction  PT role in POC to address current functional deficits  Pt educated on proper body mechanics, safety techniques, and energy conservation with PT facilitation and cueing throughout session    Patient left up in chair with all lines intact, call button in reach, and RN Shelly notified.    GOALS:   Multidisciplinary Problems       Physical Therapy Goals          Problem: Physical Therapy    Goal Priority Disciplines Outcome Goal Variances Interventions   Physical Therapy Goal     PT, PT/OT Ongoing, Progressing     Description: Goals to be met by: 24     Patient will increase functional independence with mobility by performin. Supine to sit with Contact Guard Assistance  2. Sit to supine with Contact Guard Assistance  3. Sit to stand transfer with Minimal Assistance  4. Bed to chair transfer with Minimal Assistance using Rolling Walker  5. Gait  x 100 feet with Minimal Assistance using Rolling Walker.                        Time Tracking:     PT Received On: 24  PT Start Time: 908     PT Stop Time: 926  PT Total Time (min): 18 min     Billable Minutes:   Therapeutic Activity 10 min    Treatment Type: Treatment  PT/PTA: PT       Vikash Xie, PT, DPT  2024

## 2024-01-22 NOTE — PLAN OF CARE
Problem: Adult Inpatient Plan of Care  Goal: Plan of Care Review  Outcome: Ongoing, Progressing  Goal: Patient-Specific Goal (Individualized)  Outcome: Ongoing, Progressing  Goal: Absence of Hospital-Acquired Illness or Injury  Outcome: Ongoing, Progressing  Goal: Optimal Comfort and Wellbeing  Outcome: Ongoing, Progressing  Goal: Readiness for Transition of Care  Outcome: Ongoing, Progressing     Problem: Adjustment to Illness (Sepsis/Septic Shock)  Goal: Optimal Coping  Outcome: Ongoing, Progressing     Problem: Fluid and Electrolyte Imbalance (Acute Kidney Injury/Impairment)  Goal: Fluid and Electrolyte Balance  Outcome: Ongoing, Progressing     Problem: Skin Injury Risk Increased  Goal: Skin Health and Integrity  Outcome: Ongoing, Progressing     Problem: Fall Injury Risk  Goal: Absence of Fall and Fall-Related Injury  Outcome: Ongoing, Progressing     Problem: Infection  Goal: Absence of Infection Signs and Symptoms  Outcome: Ongoing, Progressing

## 2024-01-22 NOTE — PLAN OF CARE
Pt in bed, no s/s of acute distress, no c/o voiced, safety measures in place, call light in reach  Problem: Adult Inpatient Plan of Care  Goal: Plan of Care Review  Outcome: Ongoing, Progressing  Goal: Absence of Hospital-Acquired Illness or Injury  Outcome: Ongoing, Progressing  Goal: Optimal Comfort and Wellbeing  Outcome: Ongoing, Progressing  Goal: Readiness for Transition of Care  Outcome: Ongoing, Progressing     Problem: Adjustment to Illness (Sepsis/Septic Shock)  Goal: Optimal Coping  Outcome: Ongoing, Progressing     Problem: Bleeding (Sepsis/Septic Shock)  Goal: Absence of Bleeding  Outcome: Ongoing, Progressing     Problem: Infection Progression (Sepsis/Septic Shock)  Goal: Absence of Infection Signs and Symptoms  Outcome: Ongoing, Progressing     Problem: Fluid and Electrolyte Imbalance (Acute Kidney Injury/Impairment)  Goal: Fluid and Electrolyte Balance  Outcome: Ongoing, Progressing     Problem: Renal Function Impairment (Acute Kidney Injury/Impairment)  Goal: Effective Renal Function  Outcome: Ongoing, Progressing     Problem: Skin Injury Risk Increased  Goal: Skin Health and Integrity  Outcome: Ongoing, Progressing     Problem: Fall Injury Risk  Goal: Absence of Fall and Fall-Related Injury  Outcome: Ongoing, Progressing     Problem: Infection  Goal: Absence of Infection Signs and Symptoms  Outcome: Ongoing, Progressing

## 2024-01-22 NOTE — SUBJECTIVE & OBJECTIVE
Principal Problem:Staphylococcal arthritis of left hip    Principal Orthopedic Problem: s/p nail removal and abx spacer 1/19    Interval History: POD1.  Afebrile, VSS, NAEON.  Pain has been reportedly well controlled.  Drain with 60 cc/24 hr.  postoperatively.  Intraoperative cultures positive for staph aureus; continuing vancomycin per ID. PICC line placed today. Pending final ID recs for SNF placement.         Review of patient's allergies indicates:  No Known Allergies    Current Facility-Administered Medications   Medication    (Magic mouthwash) 1:1:1 diphenhydrAMINE(Benadryl) 12.5mg/5ml liq, aluminum & magnesium hydroxide-simethicone (Maalox), LIDOcaine viscous 2%    acetaminophen tablet 650 mg    aluminum-magnesium hydroxide-simethicone 200-200-20 mg/5 mL suspension 30 mL    amiodarone tablet 200 mg    amLODIPine tablet 5 mg    apixaban tablet 5 mg    docusate sodium capsule 100 mg    EScitalopram oxalate tablet 20 mg    guaiFENesin 100 mg/5 ml syrup 200 mg    oxyCODONE immediate release tablet 5 mg    And    oxyCODONE immediate release tablet Tab 10 mg    And    HYDROmorphone injection 0.5 mg    LIDOcaine viscous HCl 2% oral solution 5 mL    melatonin tablet 6 mg    methocarbamoL tablet 500 mg    metoprolol tartrate (LOPRESSOR) tablet 50 mg    mupirocin 2 % ointment    naloxone 0.4 mg/mL injection 0.02 mg    nystatin 100,000 unit/mL suspension 500,000 Units    ondansetron disintegrating tablet 8 mg    polyethylene glycol packet 17 g    potassium chloride SA CR tablet 40 mEq    pregabalin capsule 200 mg    simethicone chewable tablet 80 mg    sodium chloride 0.9% flush 1-10 mL    sodium chloride 0.9% flush 10 mL    And    sodium chloride 0.9% flush 10 mL    valACYclovir tablet 1,000 mg    vancomycin - pharmacy to dose     Objective:     Vital Signs (Most Recent):  Temp: 98.7 °F (37.1 °C) (01/22/24 1123)  Pulse: 80 (01/22/24 1123)  Resp: 18 (01/22/24 1123)  BP: (!) 118/56 (01/22/24 1123)  SpO2: 97 % (01/22/24  "1123) Vital Signs (24h Range):  Temp:  [98.1 °F (36.7 °C)-99.3 °F (37.4 °C)] 98.7 °F (37.1 °C)  Pulse:  [] 80  Resp:  [16-18] 18  SpO2:  [92 %-97 %] 97 %  BP: (118-146)/(56-75) 118/56     Weight: 63.9 kg (140 lb 14 oz)  Height: 5' 6" (167.6 cm)  Body mass index is 22.74 kg/m².      Intake/Output Summary (Last 24 hours) at 1/22/2024 1255  Last data filed at 1/22/2024 0745  Gross per 24 hour   Intake 650 ml   Output 60 ml   Net 590 ml          Ortho/SPM Exam  AAOx4  NAD  Reg rate  No increased WOB    LLE:  Dressing c/d/i  SILT T/SP/DP/Vargas/Sa  Motor intact T/SP/DP  WWP extremities  FCDs in place and functioning.      Significant Labs: All pertinent labs within the past 24 hours have been reviewed.    Significant Imaging: I have reviewed all pertinent imaging results/findings.  "

## 2024-01-22 NOTE — PROGRESS NOTES
[facility] - Telemetry Stepdown  Orthopedics  Progress Note    Patient Name: Froilan Ray  MRN: [id number]  Admission Date: 1/17/2024  Hospital Length of Stay: 5 days  Attending Provider: Dianne Vila MD  Primary Care Provider: Alphonse Boothe III, MD  Follow-up For: Procedure(s) (LRB):  ARTHROPLASTY, HIP, GIRDLESTONE, POSTERIOR APPROACH (Left)  IRRIGATION AND DEBRIDEMENT, LOWER EXTREMITY (Left)    Post-Operative Day: 3 Days Post-Op  Subjective:     Principal Problem:Staphylococcal arthritis of left hip    Principal Orthopedic Problem: s/p nail removal and abx spacer 1/19    Interval History: POD1.  Afebrile, VSS, NAEON.  Pain has been reportedly well controlled.  Drain with 60 cc/24 hr.  postoperatively.  Intraoperative cultures positive for staph aureus; continuing vancomycin per ID. PICC line placed today. Pending final ID recs for SNF placement.         Review of patient's allergies indicates:  No Known Allergies    Current Facility-Administered Medications   Medication    (Magic mouthwash) 1:1:1 diphenhydrAMINE(Benadryl) 12.5mg/5ml liq, aluminum & magnesium hydroxide-simethicone (Maalox), LIDOcaine viscous 2%    acetaminophen tablet 650 mg    aluminum-magnesium hydroxide-simethicone 200-200-20 mg/5 mL suspension 30 mL    amiodarone tablet 200 mg    amLODIPine tablet 5 mg    apixaban tablet 5 mg    docusate sodium capsule 100 mg    EScitalopram oxalate tablet 20 mg    guaiFENesin 100 mg/5 ml syrup 200 mg    oxyCODONE immediate release tablet 5 mg    And    oxyCODONE immediate release tablet Tab 10 mg    And    HYDROmorphone injection 0.5 mg    LIDOcaine viscous HCl 2% oral solution 5 mL    melatonin tablet 6 mg    methocarbamoL tablet 500 mg    metoprolol tartrate (LOPRESSOR) tablet 50 mg    mupirocin 2 % ointment    naloxone 0.4 mg/mL injection 0.02 mg    nystatin 100,000 unit/mL suspension 500,000 Units    ondansetron disintegrating tablet 8 mg    polyethylene glycol packet 17 g    potassium chloride SA  "CR tablet 40 mEq    pregabalin capsule 200 mg    simethicone chewable tablet 80 mg    sodium chloride 0.9% flush 1-10 mL    sodium chloride 0.9% flush 10 mL    And    sodium chloride 0.9% flush 10 mL    valACYclovir tablet 1,000 mg    vancomycin - pharmacy to dose     Objective:     Vital Signs (Most Recent):  Temp: 98.7 °F (37.1 °C) (01/22/24 1123)  Pulse: 80 (01/22/24 1123)  Resp: 18 (01/22/24 1123)  BP: (!) 118/56 (01/22/24 1123)  SpO2: 97 % (01/22/24 1123) Vital Signs (24h Range):  Temp:  [98.1 °F (36.7 °C)-99.3 °F (37.4 °C)] 98.7 °F (37.1 °C)  Pulse:  [] 80  Resp:  [16-18] 18  SpO2:  [92 %-97 %] 97 %  BP: (118-146)/(56-75) 118/56     Weight: 63.9 kg (140 lb 14 oz)  Height: 5' 6" (167.6 cm)  Body mass index is 22.74 kg/m².      Intake/Output Summary (Last 24 hours) at 1/22/2024 1255  Last data filed at 1/22/2024 0745  Gross per 24 hour   Intake 650 ml   Output 60 ml   Net 590 ml         Ortho/SPM Exam  AAOx4  NAD  Reg rate  No increased WOB    LLE:  Dressing c/d/i  SILT T/SP/DP/Vargas/Sa  Motor intact T/SP/DP  WWP extremities  FCDs in place and functioning.      Significant Labs: All pertinent labs within the past 24 hours have been reviewed.    Significant Imaging: I have reviewed all pertinent imaging results/findings.  Assessment/Plan:     * Staphylococcal arthritis of left hip  Froilan Ray is a 78 y.o. female w/PMH of HTN, HLD, CHF, chronic pain on opioids, peripheral neuropathy, L-intertroch fx s/p TFNA (12/11/2018, Dr. Eulalio De La Cruz), who presents as transfer from OSH with large left hip fluid collection and XR showing femoral head collapse with cut-through of the TFNA helical blade. Blood cx + for MRSA, on vanc/ceftaroline, ID consulted. Pt underwent IR aspiration of L hip on 1/18 with 72cc of juan pus aspirated, gram stain + for gram positive cocci in clusters.    » s/p nail removal and abx spacer 1/19    Pain control: MM  PT/OT:  TTWB LLE  DVT PPx: eliquis 5 bid for a fib, SCDs at all times " when not ambulating  Abx:  Per ID; currently receiving vancomycin  Cx: hip aspiration cx growing MRSA   Repeat blood cultures NGTD  PICC team consulted     » Dispo: pending final ID recs, drain removal         Painful orthopaedic hardware  .          Paul Jimenez MD  Orthopedics  [facility] - Telemetry Stepdown

## 2024-01-22 NOTE — ASSESSMENT & PLAN NOTE
Initially presented to outside hospital with complaints of left hip pain and inability to ambulate, and met sepsis criteria with tachycardia (AFib with RVR) and leukocytosis.  Source is likely left hip septic arthritis.    Blood cultures obtained there, and lactate only mildly elevated which resolved with fluids.    We will continue vancomycin and Zosyn for presumed septic arthritis and consult Orthopedics for further evaluation.  - S/P left hip aspiration with purulent drainage- culture + MRSA  - Bcx+ MRSA, repeat NGTD  - ECHO without vegetation  - ID consulted for antibiotic recs- recommend cont vanc, check dapto susceptibility - susceptible  - ortho following. S/p hip washout and debridement. Left hip proximal femoral resection with placement of antibiotic cement spacer, Removal of cephalomedullary nail left femur, Incision and drainage with irrigation deep abscess left buttock and thigh, Excisional debridement of fascia and muscle left hip. Drain and wound vac placed.   - s/p PICC line placement  - MRI lumbar ordered per ID recs

## 2024-01-22 NOTE — ASSESSMENT & PLAN NOTE
- Bcx+ MRSA, source likely left hip  - repeat Bcx NGTD  - no vegetation noted on ECHO  - cont vanc  - ID consulted for abx recs- cont vanc  - MRI lumbar ordered per ID recs

## 2024-01-22 NOTE — PROGRESS NOTES
Pharmacokinetic Assessment Follow Up: IV Vancomycin    Vancomycin serum concentration assessment(s):    The random level was drawn correctly and can be used to guide therapy at this time. The measurement is within the desired definitive target range of 15 to 20 mcg/mL.  - The random level was drawn ~31 hours after previous dose and ~12 hours after previous level. It resulted at 18.3.  - Two-point kinetics indicate a vanc clearance half-life of ~36 hours.    Vancomycin Regimen Plan:    Will re-dose once now with vancomycin 500 mg.  - Re-dose when the random level is less than 20 mcg/mL, next level to be drawn at 1800 on 1/22.  - Ms. Ray's Scr appears stable, but slightly elevated from her previous baseline. Will continue to watch closely.  - Will continue to pulse dose in the setting of delayed vanc clearance.    Drug levels (last 3 results):  Recent Labs   Lab Result Units 01/19/24  0129 01/20/24  2138 01/21/24  0532 01/21/24  1659   Vancomycin, Random ug/mL 22.5  --  23.0 18.3   Vancomycin-Trough ug/mL  --  25.9*  --   --        Pharmacy will continue to follow and monitor vancomycin.    Please contact pharmacy at extension [phone] for questions regarding this assessment.    Thank you for the consult,   Jayde Ramos       Patient brief summary:  Froilan Ray is a 78 y.o. female initiated on antimicrobial therapy with IV Vancomycin for treatment of bone/joint infection    The patient's current regimen is pulse dosing in the setting of delayed vanc clearance    Actual Body Weight:   67.6 kg    Renal Function:   Estimated Creatinine Clearance: 56 mL/min (based on SCr of 0.8 mg/dL).     Dialysis Method (if applicable):  N/A

## 2024-01-22 NOTE — CONSULTS
Double lumen PICC to right basilic vein.  32 cm in length, 29 cm arm circumference and 0 cm exposed.   Lot # [id number].

## 2024-01-22 NOTE — PROCEDURES
"Froilan Ray is a 78 y.o. female patient.    Temp: 98.1 °F (36.7 °C) (01/22/24 0753)  Pulse: 94 (01/22/24 0753)  Resp: 18 (01/22/24 0911)  BP: (!) 146/75 (01/22/24 0753)  SpO2: (!) 93 % (01/22/24 0753)  Weight: 63.9 kg (140 lb 14 oz) (01/22/24 0106)  Height: 5' 6" (167.6 cm) (01/22/24 0106)    PICC  Date/Time: 1/22/2024 11:16 AM  Performed by: Reyna Kidd RN  Assisting provider: Chris Brambila RN  Consent Done: Yes  Time out: Immediately prior to procedure a time out was called to verify the correct patient, procedure, equipment, support staff and site/side marked as required  Indications: med administration and vascular access  Anesthesia: local infiltration  Local anesthetic: lidocaine 1% without epinephrine  Anesthetic Total (mL): 3  Description of findings: PICC  Preparation: skin prepped with ChloraPrep  Skin prep agent dried: skin prep agent completely dried prior to procedure  Sterile barriers: all five maximum sterile barriers used - cap, mask, sterile gown, sterile gloves, and large sterile sheet  Hand hygiene: hand hygiene performed prior to central venous catheter insertion  Location details: right basilic  Catheter type: double lumen  Catheter size: 5 Fr  Catheter Length: 32cm    Ultrasound guidance: yes  Vessel Caliber: medium and patent, compressibility normal  Vascular Doppler: not done  Needle advanced into vessel with real time Ultrasound guidance.  Guidewire confirmed in vessel.  Image recorded and saved.  Sterile sheath used.  Number of attempts: 1  Post-procedure: blood return through all ports, chlorhexidine patch and sterile dressing applied  Technical procedures used: 3CG  Specimens: No  Implants: No  Assessment: placement verified by x-ray  Complications: none          Name Reyna Kidd RN  1/22/2024    "

## 2024-01-22 NOTE — PT/OT/SLP PROGRESS
Occupational Therapy   Treatment    Name: Froilan Ray  MRN: [id number]  Admitting Diagnosis:  Staphylococcal arthritis of left hip  3 Days Post-Op    Recommendations:     Discharge Recommendations: Moderate Intensity Therapy  Discharge Equipment Recommendations:  to be determined by next level of care  Barriers to discharge:   (increased level of assistance needed at this time.)    Assessment:     Froilan Ray is a 78 y.o. female with a medical diagnosis of Staphylococcal arthritis of left hip. Performance deficits affecting function are weakness, impaired endurance, impaired functional mobility, gait instability, impaired balance, pain, decreased safety awareness, decreased lower extremity function, orthopedic precautions. Patient very limited due to pain and needs a significant amount of assistance for transfers with cues for adhering to weight bearing precautions. Patient would benefit from continued skilled acute OT 4x/wk to improve functional mobility, increase independence with ADLs, and address established goals. Recommending moderate intensity therapy once medically appropriate for discharge to increase maximal independence, reduce burden of care, and ensure safety.     Rehab Prognosis:  Fair; patient would benefit from acute skilled OT services to address these deficits and reach maximum level of function.       Plan:     Patient to be seen 4 x/week to address the above listed problems via self-care/home management, therapeutic activities, therapeutic exercises  Plan of Care Expires: 02/20/24  Plan of Care Reviewed with: patient    Subjective     Chief Complaint: pain  Patient/Family Comments/goals: patient agreed to therapy  Pain/Comfort:  Pain Rating 1: 10/10  Location - Side 1: Left  Location - Orientation 1: generalized  Location 1: foot  Pain Addressed 1: Reposition, Distraction  Pain Rating Post-Intervention 1: 10/10    Objective:     Communicated with: TIM prior to session.  Patient found  HOB elevated with BEULAH drain, PureWick, wound vac upon OT entry to room.    General Precautions: Standard, fall    Orthopedic Precautions:LLE toe touch weight bearing  Braces: N/A  Respiratory Status: Room air     Occupational Performance:     Bed Mobility:    Patient completed Scooting/Bridging with minimum assistance anteriorly to EOB sitting EOB  Patient completed Supine to Sit with moderate assistance with HOB elevated    Functional Mobility/Transfers:  Patient completed Bed > Chair Transfer using Squat Pivot technique with maximal assistance with hand-held assist (bedside chair on patient R side).     Activities of Daily Living:  Feeding:  setup with tray in front of patient while up in bedside chair    Grooming: setup for combing hair only sitting in bedside chair    Upper Body Dressing: moderate assistance Donning back gown sitting EOB  Lower Body Dressing: total assistance Donning socks  Toileting: total assistance PureWik in place at end of session while up in chair       [facility] 6 Click ADL: 11    Treatment & Education:  Role of OT and POC  ADL retraining  Functional mobility training  Safety  Discharge planning  Importance EOB/OOB activity    Co-treatment performed due to patient's multiple deficits requiring two skilled therapists to appropriately and safely assess patient's strength and endurance while facilitating functional tasks in addition to accommodating for patient's activity tolerance.     Patient left up in chair with all lines intact, call button in reach, and all needs met.     GOALS:   Multidisciplinary Problems       Occupational Therapy Goals          Problem: Occupational Therapy    Goal Priority Disciplines Outcome Interventions   Occupational Therapy Goal     OT, PT/OT Ongoing, Progressing    Description: Goals to be met by:  2/20/2024    Patient will increase functional independence with ADLs by performing:    UE Dressing with Set-up Assistance.  LE Dressing with Minimal Assistance using  AE as needed.  Grooming while seated with Modified [place].  Toileting from bedside commode with Maximum Assistance for hygiene and clothing management.   Supine to sit with Minimal Assistance.  Stand pivot transfers with Minimal Assistance.  Toilet transfer to bedside commode with Minimal Assistance.  Upper extremity exercise program 2x10 reps, with supervision.                         Time Tracking:     OT Date of Treatment: 01/22/24  OT Start Time: 0909  OT Stop Time: 0929  OT Total Time (min): 20 min    Billable Minutes:Self Care/Home Management 20 1/22/2024

## 2024-01-23 LAB
ABO + RH BLD: NORMAL
ALBUMIN SERPL BCP-MCNC: 1.4 G/DL (ref 3.5–5.2)
ALP SERPL-CCNC: 131 U/L (ref 55–135)
ALT SERPL W/O P-5'-P-CCNC: 34 U/L (ref 10–44)
ANION GAP SERPL CALC-SCNC: 6 MMOL/L (ref 8–16)
AST SERPL-CCNC: 59 U/L (ref 10–40)
BACTERIA BLD CULT: NORMAL
BACTERIA BLD CULT: NORMAL
BASOPHILS # BLD AUTO: 0.03 K/UL (ref 0–0.2)
BASOPHILS NFR BLD: 0.3 % (ref 0–1.9)
BILIRUB SERPL-MCNC: 0.4 MG/DL (ref 0.1–1)
BLD GP AB SCN CELLS X3 SERPL QL: NORMAL
BUN SERPL-MCNC: 14 MG/DL (ref 8–23)
CALCIUM SERPL-MCNC: 9.2 MG/DL (ref 8.7–10.5)
CHLORIDE SERPL-SCNC: 104 MMOL/L (ref 95–110)
CO2 SERPL-SCNC: 21 MMOL/L (ref 23–29)
CREAT SERPL-MCNC: 0.7 MG/DL (ref 0.5–1.4)
CRP SERPL-MCNC: 221.9 MG/L (ref 0–8.2)
DIFFERENTIAL METHOD BLD: ABNORMAL
EOSINOPHIL # BLD AUTO: 0 K/UL (ref 0–0.5)
EOSINOPHIL NFR BLD: 0.2 % (ref 0–8)
ERYTHROCYTE [DISTWIDTH] IN BLOOD BY AUTOMATED COUNT: 19.6 % (ref 11.5–14.5)
EST. GFR  (NO RACE VARIABLE): >60 ML/MIN/1.73 M^2
GLUCOSE SERPL-MCNC: 95 MG/DL (ref 70–110)
HCT VFR BLD AUTO: 21.2 % (ref 37–48.5)
HGB BLD-MCNC: 6.6 G/DL (ref 12–16)
IMM GRANULOCYTES # BLD AUTO: 0.21 K/UL (ref 0–0.04)
IMM GRANULOCYTES NFR BLD AUTO: 1.8 % (ref 0–0.5)
LYMPHOCYTES # BLD AUTO: 1.4 K/UL (ref 1–4.8)
LYMPHOCYTES NFR BLD: 12.2 % (ref 18–48)
MCH RBC QN AUTO: 25.7 PG (ref 27–31)
MCHC RBC AUTO-ENTMCNC: 31.1 G/DL (ref 32–36)
MCV RBC AUTO: 83 FL (ref 82–98)
MONOCYTES # BLD AUTO: 1.1 K/UL (ref 0.3–1)
MONOCYTES NFR BLD: 9.2 % (ref 4–15)
NEUTROPHILS # BLD AUTO: 8.9 K/UL (ref 1.8–7.7)
NEUTROPHILS NFR BLD: 76.3 % (ref 38–73)
NRBC BLD-RTO: 0 /100 WBC
PLATELET # BLD AUTO: 567 K/UL (ref 150–450)
PMV BLD AUTO: 9.8 FL (ref 9.2–12.9)
POTASSIUM SERPL-SCNC: 4.1 MMOL/L (ref 3.5–5.1)
PROT SERPL-MCNC: 5.5 G/DL (ref 6–8.4)
RBC # BLD AUTO: 2.57 M/UL (ref 4–5.4)
SODIUM SERPL-SCNC: 131 MMOL/L (ref 136–145)
SPECIMEN OUTDATE: NORMAL
VANCOMYCIN SERPL-MCNC: 11.7 UG/ML
WBC # BLD AUTO: 11.6 K/UL (ref 3.9–12.7)

## 2024-01-23 PROCEDURE — 80202 ASSAY OF VANCOMYCIN: CPT | Performed by: STUDENT IN AN ORGANIZED HEALTH CARE EDUCATION/TRAINING PROGRAM

## 2024-01-23 PROCEDURE — 63600175 PHARM REV CODE 636 W HCPCS: Performed by: STUDENT IN AN ORGANIZED HEALTH CARE EDUCATION/TRAINING PROGRAM

## 2024-01-23 PROCEDURE — 20600001 HC STEP DOWN PRIVATE ROOM

## 2024-01-23 PROCEDURE — A9585 GADOBUTROL INJECTION: HCPCS | Performed by: STUDENT IN AN ORGANIZED HEALTH CARE EDUCATION/TRAINING PROGRAM

## 2024-01-23 PROCEDURE — 86140 C-REACTIVE PROTEIN: CPT | Performed by: STUDENT IN AN ORGANIZED HEALTH CARE EDUCATION/TRAINING PROGRAM

## 2024-01-23 PROCEDURE — 25000003 PHARM REV CODE 250: Performed by: STUDENT IN AN ORGANIZED HEALTH CARE EDUCATION/TRAINING PROGRAM

## 2024-01-23 PROCEDURE — 94761 N-INVAS EAR/PLS OXIMETRY MLT: CPT

## 2024-01-23 PROCEDURE — 86850 RBC ANTIBODY SCREEN: CPT | Performed by: STUDENT IN AN ORGANIZED HEALTH CARE EDUCATION/TRAINING PROGRAM

## 2024-01-23 PROCEDURE — 25500020 PHARM REV CODE 255: Performed by: STUDENT IN AN ORGANIZED HEALTH CARE EDUCATION/TRAINING PROGRAM

## 2024-01-23 PROCEDURE — P9016 RBC LEUKOCYTES REDUCED: HCPCS | Performed by: STUDENT IN AN ORGANIZED HEALTH CARE EDUCATION/TRAINING PROGRAM

## 2024-01-23 PROCEDURE — 86920 COMPATIBILITY TEST SPIN: CPT | Performed by: STUDENT IN AN ORGANIZED HEALTH CARE EDUCATION/TRAINING PROGRAM

## 2024-01-23 PROCEDURE — 27000207 HC ISOLATION

## 2024-01-23 PROCEDURE — 80053 COMPREHEN METABOLIC PANEL: CPT | Performed by: STUDENT IN AN ORGANIZED HEALTH CARE EDUCATION/TRAINING PROGRAM

## 2024-01-23 PROCEDURE — 36415 COLL VENOUS BLD VENIPUNCTURE: CPT | Mod: XB | Performed by: STUDENT IN AN ORGANIZED HEALTH CARE EDUCATION/TRAINING PROGRAM

## 2024-01-23 PROCEDURE — 36415 COLL VENOUS BLD VENIPUNCTURE: CPT | Performed by: STUDENT IN AN ORGANIZED HEALTH CARE EDUCATION/TRAINING PROGRAM

## 2024-01-23 PROCEDURE — 99232 SBSQ HOSP IP/OBS MODERATE 35: CPT | Mod: ,,, | Performed by: INTERNAL MEDICINE

## 2024-01-23 PROCEDURE — 85025 COMPLETE CBC W/AUTO DIFF WBC: CPT | Performed by: STUDENT IN AN ORGANIZED HEALTH CARE EDUCATION/TRAINING PROGRAM

## 2024-01-23 PROCEDURE — A4216 STERILE WATER/SALINE, 10 ML: HCPCS | Performed by: STUDENT IN AN ORGANIZED HEALTH CARE EDUCATION/TRAINING PROGRAM

## 2024-01-23 RX ORDER — GADOBUTROL 604.72 MG/ML
7 INJECTION INTRAVENOUS
Status: COMPLETED | OUTPATIENT
Start: 2024-01-23 | End: 2024-01-23

## 2024-01-23 RX ORDER — HYDROCODONE BITARTRATE AND ACETAMINOPHEN 500; 5 MG/1; MG/1
TABLET ORAL
Status: DISCONTINUED | OUTPATIENT
Start: 2024-01-23 | End: 2024-01-28

## 2024-01-23 RX ADMIN — LIDOCAINE HYDROCHLORIDE 5 ML: 20 SOLUTION ORAL; TOPICAL at 01:01

## 2024-01-23 RX ADMIN — APIXABAN 5 MG: 5 TABLET, FILM COATED ORAL at 08:01

## 2024-01-23 RX ADMIN — OXYCODONE HYDROCHLORIDE 10 MG: 10 TABLET ORAL at 05:01

## 2024-01-23 RX ADMIN — OXYCODONE HYDROCHLORIDE 10 MG: 10 TABLET ORAL at 10:01

## 2024-01-23 RX ADMIN — VALACYCLOVIR HYDROCHLORIDE 1000 MG: 500 TABLET, FILM COATED ORAL at 08:01

## 2024-01-23 RX ADMIN — METOPROLOL TARTRATE 50 MG: 50 TABLET, FILM COATED ORAL at 08:01

## 2024-01-23 RX ADMIN — ESCITALOPRAM OXALATE 20 MG: 5 TABLET, FILM COATED ORAL at 08:01

## 2024-01-23 RX ADMIN — NYSTATIN 500000 UNITS: 100000 SUSPENSION ORAL at 08:01

## 2024-01-23 RX ADMIN — PREGABALIN 200 MG: 100 CAPSULE ORAL at 02:01

## 2024-01-23 RX ADMIN — Medication 10 ML: at 05:01

## 2024-01-23 RX ADMIN — Medication 10 ML: at 01:01

## 2024-01-23 RX ADMIN — LIDOCAINE HYDROCHLORIDE 5 ML: 20 SOLUTION ORAL; TOPICAL at 09:01

## 2024-01-23 RX ADMIN — METHOCARBAMOL 500 MG: 500 TABLET ORAL at 02:01

## 2024-01-23 RX ADMIN — OXYCODONE HYDROCHLORIDE 10 MG: 10 TABLET ORAL at 02:01

## 2024-01-23 RX ADMIN — AMLODIPINE BESYLATE 5 MG: 5 TABLET ORAL at 08:01

## 2024-01-23 RX ADMIN — DOCUSATE SODIUM 100 MG: 100 CAPSULE, LIQUID FILLED ORAL at 08:01

## 2024-01-23 RX ADMIN — NYSTATIN 500000 UNITS: 100000 SUSPENSION ORAL at 01:01

## 2024-01-23 RX ADMIN — LIDOCAINE HYDROCHLORIDE 5 ML: 20 SOLUTION ORAL; TOPICAL at 08:01

## 2024-01-23 RX ADMIN — PREGABALIN 200 MG: 100 CAPSULE ORAL at 08:01

## 2024-01-23 RX ADMIN — GADOBUTROL 7 ML: 604.72 INJECTION INTRAVENOUS at 11:01

## 2024-01-23 RX ADMIN — LIDOCAINE HYDROCHLORIDE 5 ML: 20 SOLUTION ORAL; TOPICAL at 05:01

## 2024-01-23 RX ADMIN — METHOCARBAMOL 500 MG: 500 TABLET ORAL at 08:01

## 2024-01-23 RX ADMIN — NYSTATIN 500000 UNITS: 100000 SUSPENSION ORAL at 05:01

## 2024-01-23 RX ADMIN — OXYCODONE HYDROCHLORIDE 10 MG: 10 TABLET ORAL at 01:01

## 2024-01-23 RX ADMIN — HYDROMORPHONE HYDROCHLORIDE 0.5 MG: 0.5 INJECTION, SOLUTION INTRAMUSCULAR; INTRAVENOUS; SUBCUTANEOUS at 08:01

## 2024-01-23 RX ADMIN — AMIODARONE HYDROCHLORIDE 200 MG: 200 TABLET ORAL at 08:01

## 2024-01-23 NOTE — PT/OT/SLP PROGRESS
Physical Therapy      Patient Name:  Froilan Ray   MRN:  [id number]    Patient not seen today secondary to Blood transfusion. Pt with H&H out of therapeutic range requiring blood transfusion. Will follow-up per POC.

## 2024-01-23 NOTE — ASSESSMENT & PLAN NOTE
Froilan Ray is a 78 y.o. female w/PMH of HTN, HLD, CHF, chronic pain on opioids, peripheral neuropathy, L-intertroch fx s/p TFNA (12/11/2018, Dr. Eulalio De La Cruz), who presents as transfer from OSH with large left hip fluid collection and XR showing femoral head collapse with cut-through of the TFNA helical blade. Blood cx + for MRSA, on vanc/ceftaroline, ID consulted. Pt underwent IR aspiration of L hip on 1/18 with 72cc of juan pus aspirated, gram stain + for gram positive cocci in clusters.    » s/p nail removal and abx spacer 1/19    Pain control: MM  PT/OT:  TTWB LLE  DVT PPx: eliquis 5 bid for a fib, SCDs at all times when not ambulating  Abx:  vanc, likely 6 weeks vanc with chronic suppression thereafter.   Cx: hip aspiration cx growing MRSA   Repeat blood cultures NGTD  PICC team consulted     » Dispo: pending final ID recs, drain removal

## 2024-01-23 NOTE — CONSULTS
"[facility] - Telemetry Stepdown  Infectious Disease  Consult Note    Patient Name: Froilan Ray  MRN: [id number]  Admission Date: 1/17/2024  Hospital Length of Stay: 6 days  Attending Physician: Dianne Vila MD  Primary Care Provider: Alphonse Boothe III, MD     Isolation Status: Contact    Patient information was obtained from patient and ER records.      Consults  Assessment/Plan:     ID  MRSA bacteremia  78F with h/o HTN, CHF admitted 1/17 as transfer from [facility] for concern for septic L hip joint. Taken to OR 1/19 for: Left hip proximal femoral resection with placement of antibiotic cement spacer, Removal of cephalomedullary nail left femur, Incision and drainage with irrigation deep abscess left buttock and thigh, Excisional debridement of fascia and muscle left hip. Bcx 1/16 with MRSA; repeat drawn 1/18 NGTD. 2d echo- adequate study, no veg. Currently on vanc. HSV pcr sent of lip blister- positive. ID consulted for "painful tongue lesions, HSV?, painful oral blisters after starting bactrim, SJS?, antibiotic recs for septic joint currently on Vanc     Likely buttock/gluteal abscess, necessitating to femoral head. Appreciate ortho assistance with source control of infection. Case c/b indwelling hardware. Pt with chronic lower back pain with spinal hardware, but mri neg for L spine infection    Recommendations:   - change to daptomycin for ease of once daily dosing oupatient  - anticipating 6 weeks IV abx with possible po abx suppression afterwards    Outpatient Antibiotic Therapy Plan:    Please send referral to [facility].    1) Infection: L hip septic arthitis with hardware, MRSA bacteremia    2) Discharge Antibiotics:    Intravenous antibiotics:  Daptomycin 8mg/kg iv daily    3) Therapy Duration:  6 weeks    Estimated end date of IV antibiotics: 2/29/24 (followed by oral abx)    4) Outpatient Weekly Labs:    Order the following labs to be drawn on Mondays:   CBC  CMP "   CRP  CPK (when on Daptomycin)        5) Fax Lab Results to Infectious Diseases Provider: Dr armstrong    [facility] Fax Number: [phone]    6) Outpatient Infectious Diseases Follow-up    Follow-up appointment will be arranged by the ID clinic and will be found in the patient's appointments tab.    Prior to discharge, please ensure the patient's follow-up has been scheduled.    If there is still no follow-up scheduled prior to discharge, please send an EPIC message to Danyell Quinones in Infectious Diseases.                  Thank you for your consult. I will sign off. Please contact us if you have any additional questions.    Kika Armstrong MD  Infectious Disease  [facility] - Telemetry Stepdown    Subjective:     Principal Problem: Staphylococcal arthritis of left hip    HPI: 78F with h/o HTN, CHF admitted 1/17 as transfer from [facility] for concern for septic joint. Per h&p, pt had reported unable to bear weight on L leg due to hip pain and +fevers. CT showed fluid around her L hip previous surgical site, concerning for septic arthritis. Was in AFib with RVR, and converted to sinus rhythm with cardizem and amiodarone infusions. She was given antibiotics and transferred here for Ortho evaluation.     Taken to OR today 1/19 for: Left hip proximal femoral resection with placement of antibiotic cement spacer                          Removal of cephalomedullary nail left femur  Incision and drainage with irrigation deep abscess left buttock and thigh  Excisional debridement of fascia and muscle left hip, 100 sq cm - 11181, 01/01/2004 6 times 4      Pt just out of surgery and is sedated. Unable to answer any questions    Bcx 1/16 with MRSA; repeat drawn 1/18 NGTD pending    Methicillin resistant Staphylococcus aureus       CULTURE, BLOOD     Ceftriaxone >32 mcg/mL Resistant     Clindamycin <=0.5 mcg/mL Sensitive     Erythromycin >4 mcg/mL Resistant     Oxacillin >2 mcg/mL Resistant     Penicillin >8 mcg/mL Resistant   "   Tetracycline <=4 mcg/mL Sensitive     Trimeth/Sulfa >2/38 mcg/mL Resistant     Vancomycin 1 mcg/mL Sensitive        Hip aspirated 1/18  Aerobic Bacterial Culture      Abnormal   STAPHYLOCOCCUS AUREUS  Many  For susceptibility see order #[id number]  P      Resulting Agency [facility]              Narrative  Performed by: [facility]  Left hip joint aspiration             OR cultures sent today, pending        2d echo    Left Ventricle: The left ventricle is normal in size. Normal wall thickness. There is concentric remodeling. Normal wall motion. There is low normal systolic function with a visually estimated ejection fraction of 50 - 55%. There is normal diastolic function.    Right Ventricle: Normal right ventricular cavity size. Wall thickness is normal. Right ventricle wall motion  is normal. Systolic function is normal.    Aortic Valve: There is moderate aortic valve sclerosis. There is moderate annular calcification present.    Mitral Valve: There is severe mitral annular calcification present.    Aorta: Aortic root is normal in size measuring 3.37 cm. Ascending aorta is normal measuring 3.51 cm.    Pulmonary Artery: The estimated pulmonary artery systolic pressure is 25 mmHg.    IVC/SVC: Normal venous pressure at 3 mmHg.         Currently on vanc/zosyn    HSV pcr sent of lip blister- pending    ID consulted for "painful tongue lesions, HSV?, painful oral blisters after starting bactrim, SJS?, antibiotic recs for septic joint currently on Vanc     Interval History: NAEO. Asking to go home. 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    Review of Systems   Constitutional:  Negative for chills and fever.   Gastrointestinal:  Negative for abdominal pain, constipation and diarrhea.   Genitourinary:  Negative for flank pain.   Musculoskeletal:  Positive for back pain.   Psychiatric/Behavioral:  Negative for confusion.    All other systems reviewed and are negative.    Objective:     Vital Signs (Most " Recent):  Temp: 98.7 °F (37.1 °C) (01/23/24 1112)  Pulse: 70 (01/23/24 1112)  Resp: 20 (01/23/24 1443)  BP: 126/62 (01/23/24 1112)  SpO2: (!) 91 % (01/23/24 1112) Vital Signs (24h Range):  Temp:  [97.7 °F (36.5 °C)-99.7 °F (37.6 °C)] 98.7 °F (37.1 °C)  Pulse:  [66-93] 70  Resp:  [16-20] 20  SpO2:  [91 %-97 %] 91 %  BP: (116-134)/(57-72) 126/62     Weight: 63.9 kg (140 lb 14 oz)  Body mass index is 22.74 kg/m².    Estimated Creatinine Clearance: 64 mL/min (based on SCr of 0.7 mg/dL).     Physical Exam  Vitals and nursing note reviewed.   Constitutional:       General: She is not in acute distress.     Appearance: She is not ill-appearing, toxic-appearing or diaphoretic.   Eyes:      Pupils: Pupils are equal, round, and reactive to light.   Cardiovascular:      Rate and Rhythm: Normal rate and regular rhythm.      Heart sounds: No murmur heard.  Pulmonary:      Effort: Pulmonary effort is normal.      Breath sounds: Normal breath sounds.   Musculoskeletal:      Comments: L hip dressings c/d/I  L>>R leg swelling   Skin:     Findings: No erythema.   Neurological:      General: No focal deficit present.      Mental Status: She is alert and oriented to person, place, and time.   Psychiatric:         Mood and Affect: Mood normal.         Behavior: Behavior normal.         Thought Content: Thought content normal.         Judgment: Judgment normal.          Significant Labs: Blood Culture:   Recent Labs   Lab 01/16/24  1413 01/18/24  1655 01/18/24  1704   LABBLOO No growth after 5 days.  Gram stain aer bottle:  Gram positive cocci  Results called to and read back by:  Romaine Doan RN ER 1/17/24 @ 6827 JLR  METHICILLIN RESISTANT STAPHYLOCOCCUS AUREUS  Known MRSA patient  * No Growth to date  No Growth to date  No Growth to date  No Growth to date  No Growth to date No Growth to date  No Growth to date  No Growth to date  No Growth to date  No Growth to date       BMP:   Recent Labs   Lab 01/23/24  0448   GLU 95    *   K 4.1      CO2 21*   BUN 14   CREATININE 0.7   CALCIUM 9.2       CBC:   Recent Labs   Lab 01/22/24  0449 01/23/24  0448   WBC 13.45* 11.60   HGB 7.5* 6.6*   HCT 23.5* 21.2*   * 567*       Wound Culture:   Recent Labs   Lab 01/18/24  1006 01/18/24  1013   LABAERO METHICILLIN RESISTANT STAPHYLOCOCCUS AUREUS  Many  * METHICILLIN RESISTANT STAPHYLOCOCCUS AUREUS  Many  For susceptibility see order #[id number]  *         Significant Imaging: I have reviewed all pertinent imaging results/findings within the past 24 hours.

## 2024-01-23 NOTE — ASSESSMENT & PLAN NOTE
- Bcx+ MRSA, source likely left hip  - repeat Bcx NGTD  - no vegetation noted on ECHO  - cont vanc  - ID consulted for abx recs- cont vanc, switch to dapto at discharge - outpt infusions to be set up   - MRI lumbar without signs of infection

## 2024-01-23 NOTE — ASSESSMENT & PLAN NOTE
Patient has a history of atrial fibrillation and initially went into RVR at outside hospital.  Converted back to sinus rhythm with CCB and amiodarone infusions.  Cardiology was consulted and recommended continuing home amiodarone, metoprolol, and resuming home Eliquis (as patient was noncompliant).  We will monitor on telemetry   - monitor H/H given recent surgery and Hgb drop, minimal bleeding noted in drain and wound vac- cont eliquis for now given high CHADVASC and minimal bleeding noted in drain and wound vac

## 2024-01-23 NOTE — PROGRESS NOTES
[facility] - Telemetry Stepdown  Orthopedics  Progress Note    Patient Name: Froilan Ray  MRN: [id number]  Admission Date: 1/17/2024  Hospital Length of Stay: 6 days  Attending Provider: Dianne Vila MD  Primary Care Provider: Alphonse Boothe III, MD  Follow-up For: Procedure(s) (LRB):  ARTHROPLASTY, HIP, GIRDLESTONE, POSTERIOR APPROACH (Left)  IRRIGATION AND DEBRIDEMENT, LOWER EXTREMITY (Left)    Post-Operative Day: 4 Days Post-Op  Subjective:     Principal Problem:Staphylococcal arthritis of left hip    Principal Orthopedic Problem: s/p nail removal and abx spacer 1/19    Interval History: Afebrile, VSS, NAEON.  Pain has been reportedly well controlled.  Drain with 90 cc/24 hr.  postoperatively.  Intraoperative cultures positive for MRSA; continuing vancomycin per ID. PICC line in place. Hgb 6.6, transfusing 1 unit.         Review of patient's allergies indicates:  No Known Allergies    Current Facility-Administered Medications   Medication    (Magic mouthwash) 1:1:1 diphenhydrAMINE(Benadryl) 12.5mg/5ml liq, aluminum & magnesium hydroxide-simethicone (Maalox), LIDOcaine viscous 2%    acetaminophen tablet 650 mg    aluminum-magnesium hydroxide-simethicone 200-200-20 mg/5 mL suspension 30 mL    amiodarone tablet 200 mg    amLODIPine tablet 5 mg    apixaban tablet 5 mg    docusate sodium capsule 100 mg    EScitalopram oxalate tablet 20 mg    guaiFENesin 100 mg/5 ml syrup 200 mg    oxyCODONE immediate release tablet 5 mg    And    oxyCODONE immediate release tablet Tab 10 mg    And    HYDROmorphone injection 0.5 mg    LIDOcaine viscous HCl 2% oral solution 5 mL    melatonin tablet 6 mg    methocarbamoL tablet 500 mg    metoprolol tartrate (LOPRESSOR) tablet 50 mg    naloxone 0.4 mg/mL injection 0.02 mg    nystatin 100,000 unit/mL suspension 500,000 Units    ondansetron disintegrating tablet 8 mg    polyethylene glycol packet 17 g    pregabalin capsule 200 mg    simethicone chewable tablet 80 mg    sodium  "chloride 0.9% flush 1-10 mL    sodium chloride 0.9% flush 10 mL    And    sodium chloride 0.9% flush 10 mL    valACYclovir tablet 1,000 mg    vancomycin - pharmacy to dose     Objective:     Vital Signs (Most Recent):  Temp: 97.7 °F (36.5 °C) (01/23/24 0732)  Pulse: 85 (01/23/24 0732)  Resp: 18 (01/23/24 0732)  BP: 134/72 (01/23/24 0732)  SpO2: (!) 94 % (01/23/24 0732) Vital Signs (24h Range):  Temp:  [97.7 °F (36.5 °C)-99.7 °F (37.6 °C)] 97.7 °F (36.5 °C)  Pulse:  [66-93] 85  Resp:  [16-19] 18  SpO2:  [93 %-97 %] 94 %  BP: (116-134)/(56-72) 134/72     Weight: 63.9 kg (140 lb 14 oz)  Height: 5' 6" (167.6 cm)  Body mass index is 22.74 kg/m².      Intake/Output Summary (Last 24 hours) at 1/23/2024 0941  Last data filed at 1/23/2024 0530  Gross per 24 hour   Intake 390 ml   Output 840 ml   Net -450 ml         Ortho/SPM Exam  AAOx4  NAD  Reg rate  No increased WOB    LLE:  Dressing c/d/i  SILT T/SP/DP/Vargas/Sa  Motor intact T/SP/DP  WWP extremities  FCDs in place and functioning.      Significant Labs: All pertinent labs within the past 24 hours have been reviewed.    Significant Imaging: I have reviewed all pertinent imaging results/findings.  Assessment/Plan:     * Staphylococcal arthritis of left hip  Froilan Ray is a 78 y.o. female w/PMH of HTN, HLD, CHF, chronic pain on opioids, peripheral neuropathy, L-intertroch fx s/p TFNA (12/11/2018, Dr. Eulalio De La Cruz), who presents as transfer from OSH with large left hip fluid collection and XR showing femoral head collapse with cut-through of the TFNA helical blade. Blood cx + for MRSA, on vanc/ceftaroline, ID consulted. Pt underwent IR aspiration of L hip on 1/18 with 72cc of juan pus aspirated, gram stain + for gram positive cocci in clusters.    » s/p nail removal and abx spacer 1/19    Pain control: MM  PT/OT:  TTWB LLE  DVT PPx: eliquis 5 bid for a fib, SCDs at all times when not ambulating  Abx:  vanc, likely 6 weeks vanc with chronic suppression thereafter. "   Cx: hip aspiration cx growing MRSA   Repeat blood cultures NGTD  PICC team consulted     » Dispo: pending final ID recs, drain removal         Painful orthopaedic hardware  .          Paul Jimenez MD  Orthopedics  [facility] - Telemetry Stepdown

## 2024-01-23 NOTE — SUBJECTIVE & OBJECTIVE
Interval History: Reports mouth pain improving. Reports back and hip pain.     H/H drop transfuse 1 unit. Minimal blood output in wound vac and drain  Repeat Bcx NGTD. PICC line placed  switch to dapto at discharge per ID recs  MRI lumbar without signs of infection   Refusing SNF, will set up HH and infusions      Review of Systems   Constitutional:  Negative for fever.   HENT:  Positive for mouth sores and trouble swallowing.    Respiratory:  Negative for shortness of breath.    Cardiovascular:  Negative for chest pain.   Gastrointestinal:  Negative for abdominal pain.   Genitourinary:  Negative for dysuria.   Musculoskeletal:  Positive for back pain and gait problem.        Left hip pain   Psychiatric/Behavioral:  Negative for confusion.      Objective:     Vital Signs (Most Recent):  Temp: 98.7 °F (37.1 °C) (01/23/24 1112)  Pulse: 82 (01/23/24 1505)  Resp: 20 (01/23/24 1443)  BP: 126/62 (01/23/24 1112)  SpO2: (!) 91 % (01/23/24 1112) Vital Signs (24h Range):  Temp:  [97.7 °F (36.5 °C)-99.7 °F (37.6 °C)] 98.7 °F (37.1 °C)  Pulse:  [66-93] 82  Resp:  [16-20] 20  SpO2:  [91 %-97 %] 91 %  BP: (116-134)/(57-72) 126/62     Weight: 63.9 kg (140 lb 14 oz)  Body mass index is 22.74 kg/m².    Intake/Output Summary (Last 24 hours) at 1/23/2024 1507  Last data filed at 1/23/2024 1200  Gross per 24 hour   Intake 270 ml   Output 850 ml   Net -580 ml           Physical Exam  Vitals and nursing note reviewed.   Constitutional:       General: She is not in acute distress.     Appearance: She is ill-appearing.   HENT:      Head: Normocephalic and atraumatic.      Nose: Nose normal.      Mouth/Throat:      Mouth: Mucous membranes are moist.      Comments: Gray vesicular lesions on tongue, and angular cheilitis noted. no white plaques inside mouth  Eyes:      Extraocular Movements: Extraocular movements intact.      Conjunctiva/sclera: Conjunctivae normal.      Pupils: Pupils are equal, round, and reactive to light.    Cardiovascular:      Rate and Rhythm: Normal rate and regular rhythm.      Pulses: Normal pulses.      Heart sounds: Normal heart sounds.   Pulmonary:      Effort: Pulmonary effort is normal.      Breath sounds: Normal breath sounds.   Abdominal:      General: Abdomen is flat. Bowel sounds are normal. There is no distension.      Palpations: Abdomen is soft.      Tenderness: There is no abdominal tenderness.   Musculoskeletal:      Cervical back: Normal range of motion and neck supple.      Left lower leg: Edema present.      Comments: S/p hip washout with dressing and drain in place, minimal sanguinous output, wound vac in place   Skin:     General: Skin is warm and dry.   Neurological:      General: No focal deficit present.      Mental Status: She is alert.   Psychiatric:         Mood and Affect: Mood normal.         Behavior: Behavior normal.             Significant Labs: All pertinent labs within the past 24 hours have been reviewed.    Significant Imaging: I have reviewed all pertinent imaging results/findings within the past 24 hours.

## 2024-01-23 NOTE — PT/OT/SLP PROGRESS
Occupational Therapy      Patient Name:  Froilan Ray   MRN:  [id number]    Patient not seen today secondary to pt with H&H out of therapeutic range. Pt reporting increased fatigue, awaiting transfusion. Will follow-up as appropriate.    1/23/2024

## 2024-01-23 NOTE — ASSESSMENT & PLAN NOTE
"78F with h/o HTN, CHF admitted 1/17 as transfer from [facility] for concern for septic L hip joint. Taken to OR 1/19 for: Left hip proximal femoral resection with placement of antibiotic cement spacer, Removal of cephalomedullary nail left femur, Incision and drainage with irrigation deep abscess left buttock and thigh, Excisional debridement of fascia and muscle left hip. Bcx 1/16 with MRSA; repeat drawn 1/18 NGTD. 2d echo- adequate study, no veg. Currently on vanc. HSV pcr sent of lip blister- positive. ID consulted for "painful tongue lesions, HSV?, painful oral blisters after starting bactrim, SJS?, antibiotic recs for septic joint currently on Vanc     Likely buttock/gluteal abscess, necessitating to femoral head. Appreciate ortho assistance with source control of infection. Case c/b indwelling hardware. Pt with chronic lower back pain with spinal hardware, but mri neg for L spine infection    Recommendations:   - change to daptomycin for ease of once daily dosing oupatient  - anticipating 6 weeks IV abx with possible po abx suppression afterwards    Outpatient Antibiotic Therapy Plan:    Please send referral to [facility].    1) Infection: L hip septic arthitis with hardware, MRSA bacteremia    2) Discharge Antibiotics:    Intravenous antibiotics:  Daptomycin 8mg/kg iv daily    3) Therapy Duration:  6 weeks    Estimated end date of IV antibiotics: 2/29/24 (followed by oral abx)    4) Outpatient Weekly Labs:    Order the following labs to be drawn on Mondays:   CBC  CMP   CRP  CPK (when on Daptomycin)        5) Fax Lab Results to Infectious Diseases Provider: Dr amaral    [facility] Fax Number: [phone]    6) Outpatient Infectious Diseases Follow-up    Follow-up appointment will be arranged by the ID clinic and will be found in the patient's appointments tab.    Prior to discharge, please ensure the patient's follow-up has been scheduled.    If there is still no follow-up " scheduled prior to discharge, please send an EPIC message to Danyell Quinones in Infectious Diseases.

## 2024-01-23 NOTE — PROGRESS NOTES
Pharmacokinetic Assessment Follow Up: IV Vancomycin    Vancomycin serum concentration assessment(s):    The random level was drawn correctly and can be used to guide therapy at this time. The measurement is below the desired definitive target range of 15 to 20 mcg/mL.  - The random level was drawn ~23 hours after previous dose and resulted at 10.2.    Vancomycin Regimen Plan:    Will re-dose once now with vancomycin 1000 mg.  - Re-dose when the random level is less than 20 mcg/mL, next level to be drawn at 2000 on 1/23.  - Ms. Ray's Scr appears stable and back at her baseline. Her levels show that she's clearing well. May be able to schedule a regimen for her soon.  - Will continue pulse dosing for now to further assess her improving clearance and determine an appropriate dose to schedule for her.    Drug levels (last 3 results):  Recent Labs   Lab Result Units 01/20/24  2138 01/21/24  0532 01/21/24  1659 01/22/24  1713   Vancomycin, Random ug/mL  --  23.0 18.3 10.2   Vancomycin-Trough ug/mL 25.9*  --   --   --        Pharmacy will continue to follow and monitor vancomycin.    Please contact pharmacy at extension [phone] for questions regarding this assessment.    Thank you for the consult,   Jayde Ramos       Patient brief summary:  Froilan Ray is a 78 y.o. female initiated on antimicrobial therapy with IV Vancomycin for treatment of bone/joint infection    The patient's current regimen is pulse dosing in the setting of improving vanc clearance to determine appropriate regimen    Actual Body Weight:   63.9 kg    Renal Function:   Estimated Creatinine Clearance: 64 mL/min (based on SCr of 0.7 mg/dL).     Dialysis Method (if applicable):  N/A

## 2024-01-23 NOTE — ASSESSMENT & PLAN NOTE
Initially presented to outside hospital with complaints of left hip pain and inability to ambulate, and met sepsis criteria with tachycardia (AFib with RVR) and leukocytosis.  Source is likely left hip septic arthritis.    Blood cultures obtained there, and lactate only mildly elevated which resolved with fluids.    We will continue vancomycin and Zosyn for presumed septic arthritis and consult Orthopedics for further evaluation.  - S/P left hip aspiration with purulent drainage- culture + MRSA  - Bcx+ MRSA, repeat NGTD  - ECHO without vegetation  - ID consulted for antibiotic recs- recommend cont vanc, check dapto susceptibility - susceptible  - ortho following. S/p hip washout and debridement. Left hip proximal femoral resection with placement of antibiotic cement spacer, Removal of cephalomedullary nail left femur, Incision and drainage with irrigation deep abscess left buttock and thigh, Excisional debridement of fascia and muscle left hip. Drain and wound vac placed.   - s/p PICC line placement  - MRI lumbar without signs of infection   - switch to dapto at discharge per ID recs, outpt infusions to be set up

## 2024-01-23 NOTE — ASSESSMENT & PLAN NOTE
Chronic, gets iron infusions  - hold iron infusions in active infection  - also blood loss anemia associated with recent hip surgery  - Monitor and transfuse for Hgb <7  - Transfuse 1 unit 1/23

## 2024-01-23 NOTE — PLAN OF CARE
Pt AAOx3, c/o of incisional pain on her left hip. Left hip wound vac CDI at 125mmhg, BEULAH drain with serosanguinous drainage noted. Pain controlled with PRN oxycodone. VSS, no acute distress noted. POC on going.    Problem: Adult Inpatient Plan of Care  Goal: Plan of Care Review  Outcome: Ongoing, Progressing  Goal: Absence of Hospital-Acquired Illness or Injury  Outcome: Ongoing, Progressing  Goal: Optimal Comfort and Wellbeing  Outcome: Ongoing, Progressing  Goal: Readiness for Transition of Care  Outcome: Ongoing, Progressing     Problem: Skin Injury Risk Increased  Goal: Skin Health and Integrity  Outcome: Ongoing, Progressing     Problem: Fall Injury Risk  Goal: Absence of Fall and Fall-Related Injury  Outcome: Ongoing, Progressing     Problem: Infection  Goal: Absence of Infection Signs and Symptoms  Outcome: Ongoing, Progressing

## 2024-01-23 NOTE — ASSESSMENT & PLAN NOTE
- see sepsis due to MRSA     Monitor: The problem is stable.  Evaluation: No labs/tests required today.  Assessment/Treatment:  Managed by specialist.   Intravesical chemotx with BCG

## 2024-01-23 NOTE — SUBJECTIVE & OBJECTIVE
Principal Problem:Staphylococcal arthritis of left hip    Principal Orthopedic Problem: s/p nail removal and abx spacer 1/19    Interval History: Afebrile, VSS, NAEON.  Pain has been reportedly well controlled.  Drain with 90 cc/24 hr.  postoperatively.  Intraoperative cultures positive for MRSA; continuing vancomycin per ID. PICC line in place. Hgb 6.6, transfusing 1 unit.         Review of patient's allergies indicates:  No Known Allergies    Current Facility-Administered Medications   Medication    (Magic mouthwash) 1:1:1 diphenhydrAMINE(Benadryl) 12.5mg/5ml liq, aluminum & magnesium hydroxide-simethicone (Maalox), LIDOcaine viscous 2%    acetaminophen tablet 650 mg    aluminum-magnesium hydroxide-simethicone 200-200-20 mg/5 mL suspension 30 mL    amiodarone tablet 200 mg    amLODIPine tablet 5 mg    apixaban tablet 5 mg    docusate sodium capsule 100 mg    EScitalopram oxalate tablet 20 mg    guaiFENesin 100 mg/5 ml syrup 200 mg    oxyCODONE immediate release tablet 5 mg    And    oxyCODONE immediate release tablet Tab 10 mg    And    HYDROmorphone injection 0.5 mg    LIDOcaine viscous HCl 2% oral solution 5 mL    melatonin tablet 6 mg    methocarbamoL tablet 500 mg    metoprolol tartrate (LOPRESSOR) tablet 50 mg    naloxone 0.4 mg/mL injection 0.02 mg    nystatin 100,000 unit/mL suspension 500,000 Units    ondansetron disintegrating tablet 8 mg    polyethylene glycol packet 17 g    pregabalin capsule 200 mg    simethicone chewable tablet 80 mg    sodium chloride 0.9% flush 1-10 mL    sodium chloride 0.9% flush 10 mL    And    sodium chloride 0.9% flush 10 mL    valACYclovir tablet 1,000 mg    vancomycin - pharmacy to dose     Objective:     Vital Signs (Most Recent):  Temp: 97.7 °F (36.5 °C) (01/23/24 0732)  Pulse: 85 (01/23/24 0732)  Resp: 18 (01/23/24 0732)  BP: 134/72 (01/23/24 0732)  SpO2: (!) 94 % (01/23/24 0732) Vital Signs (24h Range):  Temp:  [97.7 °F (36.5 °C)-99.7 °F (37.6 °C)] 97.7 °F (36.5  "°C)  Pulse:  [66-93] 85  Resp:  [16-19] 18  SpO2:  [93 %-97 %] 94 %  BP: (116-134)/(56-72) 134/72     Weight: 63.9 kg (140 lb 14 oz)  Height: 5' 6" (167.6 cm)  Body mass index is 22.74 kg/m².      Intake/Output Summary (Last 24 hours) at 1/23/2024 0941  Last data filed at 1/23/2024 0530  Gross per 24 hour   Intake 390 ml   Output 840 ml   Net -450 ml          Ortho/SPM Exam  AAOx4  NAD  Reg rate  No increased WOB    LLE:  Dressing c/d/i  SILT T/SP/DP/Vargas/Sa  Motor intact T/SP/DP  WWP extremities  FCDs in place and functioning.      Significant Labs: All pertinent labs within the past 24 hours have been reviewed.    Significant Imaging: I have reviewed all pertinent imaging results/findings.  "

## 2024-01-23 NOTE — PLAN OF CARE
[facility] - Telemetry Stepdown  Discharge Reassessment    Primary Care Provider: Alphonse Boothe III, MD    Expected Discharge Date: 1/24/2024    Reassessment (most recent)       Discharge Reassessment - 01/22/24 1500          Discharge Reassessment    Assessment Type Discharge Planning Reassessment     Did the patient's condition or plan change since previous assessment? Yes     Discharge Plan discussed with: Spouse/sig other;Patient     Communicated BAILEY with patient/caregiver Yes     Discharge Plan A Skilled Nursing Facility     Discharge Plan B Home with family;Home Health     DME Needed Upon Discharge  --   TBD    Transition of Care Barriers None     Why the patient remains in the hospital Requires continued medical care        Post-Acute Status    Post-Acute Authorization Placement;Home Health;IV Infusion     Post-Acute Placement Status Patient declined/refused     Home Health Status Referrals Sent     IV Infusion Status Referral(s) sent     Discharge Delays None known at this time                 Discharge Plan A and Plan B have been determined by review of patient's clinical status, future medical and therapeutic needs, and coverage/benefits for post-acute care in coordination with multidisciplinary team members.    Met with patient and spouse to review discharge recommendation of SNF and is NOT agreeable to plan. Patient and spouse prefer to DC home with HH and home infusion. Spouse reports he feels confident he can continue to care for all of patients needs at home and has family support. Spouse does report patient may need stretcher transport at DC. Per PT note review, patient was only able to sit on the edge of bed for 5 minutes.    Patient/family provided list of facilities in-network with patient's payor plan. Providers that are owned, operated, or affiliated with [facility] are included on the list.     Notified that referral sent to below listed facilities from in-network list based on proximity  to home/family support:   [facility] and [facility]    Preferred Facility:  [facility] and [facility]    If an additional preferred facility not listed above is identified, additional referral to be sent. If above facilities unable to accept, will send additional referrals to in-network providers.     Indira Mckinley LCSW  [facility]  Ext. [phone]

## 2024-01-23 NOTE — PROGRESS NOTES
"[facility] - Telemetry [facility] Medicine  Progress Note    Patient Name: Froilan Ray  MRN: [id number]  Patient Class: IP- Inpatient   Admission Date: 1/17/2024  Length of Stay: 6 days  Attending Physician: Dianne Vila MD  Primary Care Provider: Alphonse Boothe III, MD        Subjective:     Principal Problem:Staphylococcal arthritis of left hip        HPI:  Froilan Ray is a 78 y.o. female with history of L hip surgery, HTN, HLD, CHF, chronic pain on opioids, peripheral neuropathy who was transferred from [facility] for concerns for septic joint.     Patient is a poor historian regarding timing and characterization of her symptoms. She reports that for "a long time" she has been "unable to do anything," basically lying in bed most of the day as she is unable to bear weight on her L leg due to hip pain. She reports chronic hip pain for which she takes "2 Percocets every 6 hours," however this has not relieved her pain for at least the past 2-3 weeks. She denies any fever, n/v, diarrhea, or other systemic symptoms except dry mouth.     At the OSH, she met sepsis criteria with leukocytosis and tachycardia. CT showed fluid around her L hip previous surgical site, concerning for septic arthritis. Was in AFib with RVR, and converted to sinus rhythm with cardizem and amiodarone infusions. She was given antibiotics and transferred here for Ortho evaluation.     Overview/Hospital Course:  Patient admitted for septic hip. S/p aspiration with purulent fluid removal, culture +Staph. Bcx+ MRSA. Started on vanc/zosyn- deescalated to vanc. Ortho consulted and following. S/p hip washout and debridement with Left hip proximal femoral resection with placement of antibiotic cement spacer, Removal of cephalomedullary nail left femur, Incision and drainage with irrigation deep abscess left buttock and thigh, Excisional debridement of fascia and muscle left hip. Drain and wound vac placed.   Noted painful lesions on " tongue after starting bactrim considering SJS vs HSV as lesions appear vesicular. Viscous lidocaine for mouth pain. Started empiric valtrex. HSV PCR indeterminate, HSV DNA detected but couldn't differentiate between 1 and 2. Cont valtrex.  ID consulted for abx recs, switch to dapto. Repeat Bcx NGTD.  PICC placed. Recommending MRI lumbar, no infection noted in lumbar spine.   PT/OT consulted. Patient refusing SNF.     Interval History: Reports mouth pain improving. Reports back and hip pain.     H/H drop transfuse 1 unit. Minimal blood output in wound vac and drain  Repeat Bcx NGTD. PICC line placed  switch to dapto at discharge per ID recs  MRI lumbar without signs of infection   Refusing SNF, will set up HH and infusions      Review of Systems   Constitutional:  Negative for fever.   HENT:  Positive for mouth sores and trouble swallowing.    Respiratory:  Negative for shortness of breath.    Cardiovascular:  Negative for chest pain.   Gastrointestinal:  Negative for abdominal pain.   Genitourinary:  Negative for dysuria.   Musculoskeletal:  Positive for back pain and gait problem.        Left hip pain   Psychiatric/Behavioral:  Negative for confusion.      Objective:     Vital Signs (Most Recent):  Temp: 98.7 °F (37.1 °C) (01/23/24 1112)  Pulse: 82 (01/23/24 1505)  Resp: 20 (01/23/24 1443)  BP: 126/62 (01/23/24 1112)  SpO2: (!) 91 % (01/23/24 1112) Vital Signs (24h Range):  Temp:  [97.7 °F (36.5 °C)-99.7 °F (37.6 °C)] 98.7 °F (37.1 °C)  Pulse:  [66-93] 82  Resp:  [16-20] 20  SpO2:  [91 %-97 %] 91 %  BP: (116-134)/(57-72) 126/62     Weight: 63.9 kg (140 lb 14 oz)  Body mass index is 22.74 kg/m².    Intake/Output Summary (Last 24 hours) at 1/23/2024 1507  Last data filed at 1/23/2024 1200  Gross per 24 hour   Intake 270 ml   Output 850 ml   Net -580 ml           Physical Exam  Vitals and nursing note reviewed.   Constitutional:       General: She is not in acute distress.     Appearance: She is ill-appearing.   HENT:       Head: Normocephalic and atraumatic.      Nose: Nose normal.      Mouth/Throat:      Mouth: Mucous membranes are moist.      Comments: Gray vesicular lesions on tongue, and angular cheilitis noted. no white plaques inside mouth  Eyes:      Extraocular Movements: Extraocular movements intact.      Conjunctiva/sclera: Conjunctivae normal.      Pupils: Pupils are equal, round, and reactive to light.   Cardiovascular:      Rate and Rhythm: Normal rate and regular rhythm.      Pulses: Normal pulses.      Heart sounds: Normal heart sounds.   Pulmonary:      Effort: Pulmonary effort is normal.      Breath sounds: Normal breath sounds.   Abdominal:      General: Abdomen is flat. Bowel sounds are normal. There is no distension.      Palpations: Abdomen is soft.      Tenderness: There is no abdominal tenderness.   Musculoskeletal:      Cervical back: Normal range of motion and neck supple.      Left lower leg: Edema present.      Comments: S/p hip washout with dressing and drain in place, minimal sanguinous output, wound vac in place   Skin:     General: Skin is warm and dry.   Neurological:      General: No focal deficit present.      Mental Status: She is alert.   Psychiatric:         Mood and Affect: Mood normal.         Behavior: Behavior normal.             Significant Labs: All pertinent labs within the past 24 hours have been reviewed.    Significant Imaging: I have reviewed all pertinent imaging results/findings within the past 24 hours.    Assessment/Plan:      * Staphylococcal arthritis of left hip  - see sepsis due to MRSA      Oral lesion  - painful gray vesicular lesions noted on tongue  - previously thought to be thrush and patient started on nystatin  - does not appear to be thrush on my exam given appearance  - started few days after starting bactrim  - concern for SJS given proximity to bactrim use versus HSV lesions, PCR indeterminate, HSV DNA detected but unable to distinguish 1 or 2  - viscous  lidocaine ordered   - cont empiric valtrex    MRSA bacteremia  - Bcx+ MRSA, source likely left hip  - repeat Bcx NGTD  - no vegetation noted on ECHO  - cont vanc  - ID consulted for abx recs- cont vanc, switch to dapto at discharge - outpt infusions to be set up   - MRI lumbar without signs of infection       Painful orthopaedic hardware  - septic hip joint with hardware in place  - ortho following   - see sepsis 2/2 MRSA      (HFpEF) heart failure with preserved ejection fraction  Monitor volume status. Control HTN.   - TTE: Left Ventricle: The left ventricle is normal in size. Normal wall thickness. There is concentric remodeling. Normal wall motion. There is low normal systolic function with a visually estimated ejection fraction of 50 - 55%. There is normal diastolic function.    Right Ventricle: Normal right ventricular cavity size. Wall thickness is normal. Right ventricle wall motion  is normal. Systolic function is normal.    Aortic Valve: There is moderate aortic valve sclerosis. There is moderate annular calcification present.    Mitral Valve: There is severe mitral annular calcification present.    Aorta: Aortic root is normal in size measuring 3.37 cm. Ascending aorta is normal measuring 3.51 cm.    Pulmonary Artery: The estimated pulmonary artery systolic pressure is 25 mmHg.    IVC/SVC: Normal venous pressure at 3 mmHg.    Depression  Continue home Lexapro.        Atrial fibrillation with RVR  Patient has a history of atrial fibrillation and initially went into RVR at outside hospital.  Converted back to sinus rhythm with CCB and amiodarone infusions.  Cardiology was consulted and recommended continuing home amiodarone, metoprolol, and resuming home Eliquis (as patient was noncompliant).  We will monitor on telemetry   - monitor H/H given recent surgery and Hgb drop, minimal bleeding noted in drain and wound vac- cont eliquis for now given high CHADVASC and minimal bleeding noted in drain and wound  vac      Sepsis due to methicillin resistant Staphylococcus aureus (MRSA)  Initially presented to outside hospital with complaints of left hip pain and inability to ambulate, and met sepsis criteria with tachycardia (AFib with RVR) and leukocytosis.  Source is likely left hip septic arthritis.    Blood cultures obtained there, and lactate only mildly elevated which resolved with fluids.    We will continue vancomycin and Zosyn for presumed septic arthritis and consult Orthopedics for further evaluation.  - S/P left hip aspiration with purulent drainage- culture + MRSA  - Bcx+ MRSA, repeat NGTD  - ECHO without vegetation  - ID consulted for antibiotic recs- recommend cont vanc, check dapto susceptibility - susceptible  - ortho following. S/p hip washout and debridement. Left hip proximal femoral resection with placement of antibiotic cement spacer, Removal of cephalomedullary nail left femur, Incision and drainage with irrigation deep abscess left buttock and thigh, Excisional debridement of fascia and muscle left hip. Drain and wound vac placed.   - s/p PICC line placement  - MRI lumbar without signs of infection   - switch to dapto at discharge per ID recs, outpt infusions to be set up     Iron deficiency anemia  Chronic, gets iron infusions  - hold iron infusions in active infection  - also blood loss anemia associated with recent hip surgery  - Monitor and transfuse for Hgb <7  - Transfuse 1 unit 1/23    Peripheral neuropathy  Continue home Lyrica.    Essential hypertension  Continue hospital formulary of home antihypertensives.     Chronic pain with uncomplicated opioid dependence  PDMP reviewed. Will continue pain control for septic arthritis.         VTE Risk Mitigation (From admission, onward)           Ordered     apixaban tablet 5 mg  2 times daily         01/19/24 8583     Reason for No Pharmacological VTE Prophylaxis  Once        Question:  Reasons:  Answer:  Already adequately anticoagulated on oral  Anticoagulants    01/17/24 2037     IP VTE HIGH RISK PATIENT  Once         01/17/24 2037     Place sequential compression device  Until discontinued         01/17/24 2037                    Discharge Planning   BAILEY: 1/24/2024     Code Status: Full Code   Is the patient medically ready for discharge?:     Reason for patient still in hospital (select all that apply): Patient trending condition and Consult recommendations  Discharge Plan A: Skilled Nursing Facility   Discharge Delays: None known at this time              Dianne Vila MD  Department of Hospital Medicine   [facility] - Telemetry Stepdown

## 2024-01-23 NOTE — SUBJECTIVE & OBJECTIVE
Interval History: NAEO. Asking to go home. 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    Review of Systems   Constitutional:  Negative for chills and fever.   Gastrointestinal:  Negative for abdominal pain, constipation and diarrhea.   Genitourinary:  Negative for flank pain.   Musculoskeletal:  Positive for back pain.   Psychiatric/Behavioral:  Negative for confusion.    All other systems reviewed and are negative.    Objective:     Vital Signs (Most Recent):  Temp: 98.7 °F (37.1 °C) (01/23/24 1112)  Pulse: 70 (01/23/24 1112)  Resp: 20 (01/23/24 1443)  BP: 126/62 (01/23/24 1112)  SpO2: (!) 91 % (01/23/24 1112) Vital Signs (24h Range):  Temp:  [97.7 °F (36.5 °C)-99.7 °F (37.6 °C)] 98.7 °F (37.1 °C)  Pulse:  [66-93] 70  Resp:  [16-20] 20  SpO2:  [91 %-97 %] 91 %  BP: (116-134)/(57-72) 126/62     Weight: 63.9 kg (140 lb 14 oz)  Body mass index is 22.74 kg/m².    Estimated Creatinine Clearance: 64 mL/min (based on SCr of 0.7 mg/dL).     Physical Exam  Vitals and nursing note reviewed.   Constitutional:       General: She is not in acute distress.     Appearance: She is not ill-appearing, toxic-appearing or diaphoretic.   Eyes:      Pupils: Pupils are equal, round, and reactive to light.   Cardiovascular:      Rate and Rhythm: Normal rate and regular rhythm.      Heart sounds: No murmur heard.  Pulmonary:      Effort: Pulmonary effort is normal.      Breath sounds: Normal breath sounds.   Musculoskeletal:      Comments: L hip dressings c/d/I  L>>R leg swelling   Skin:     Findings: No erythema.   Neurological:      General: No focal deficit present.      Mental Status: She is alert and oriented to person, place, and time.   Psychiatric:         Mood and Affect: Mood normal.         Behavior: Behavior normal.         Thought Content: Thought content normal.         Judgment: Judgment normal.          Significant Labs: Blood Culture:   Recent Labs   Lab 01/16/24  1413 01/18/24  0118  01/18/24  1704   LABBLOO No growth after 5 days.  Gram stain aer bottle:  Gram positive cocci  Results called to and read back by:  Romaine Doan RN ER 1/17/24 @ 1948 JLR  METHICILLIN RESISTANT STAPHYLOCOCCUS AUREUS  Known MRSA patient  * No Growth to date  No Growth to date  No Growth to date  No Growth to date  No Growth to date No Growth to date  No Growth to date  No Growth to date  No Growth to date  No Growth to date       BMP:   Recent Labs   Lab 01/23/24  0448   GLU 95   *   K 4.1      CO2 21*   BUN 14   CREATININE 0.7   CALCIUM 9.2       CBC:   Recent Labs   Lab 01/22/24  0449 01/23/24  0448   WBC 13.45* 11.60   HGB 7.5* 6.6*   HCT 23.5* 21.2*   * 567*       Wound Culture:   Recent Labs   Lab 01/18/24  1006 01/18/24  1013   LABAERO METHICILLIN RESISTANT STAPHYLOCOCCUS AUREUS  Many  * METHICILLIN RESISTANT STAPHYLOCOCCUS AUREUS  Many  For susceptibility see order #[id number]  *         Significant Imaging: I have reviewed all pertinent imaging results/findings within the past 24 hours.

## 2024-01-24 PROBLEM — K52.9 COLITIS: Status: RESOLVED | Noted: 2020-01-08 | Resolved: 2024-01-24

## 2024-01-24 PROBLEM — Z79.01 ANTICOAGULATED: Chronic | Status: ACTIVE | Noted: 2022-06-30

## 2024-01-24 PROBLEM — Z71.89 ACP (ADVANCE CARE PLANNING): Status: RESOLVED | Noted: 2020-01-08 | Resolved: 2024-01-24

## 2024-01-24 PROBLEM — N17.9 AKI (ACUTE KIDNEY INJURY): Status: RESOLVED | Noted: 2022-06-15 | Resolved: 2024-01-24

## 2024-01-24 PROBLEM — I48.91 ATRIAL FIBRILLATION WITH RVR: Chronic | Status: ACTIVE | Noted: 2022-06-16

## 2024-01-24 LAB
ALBUMIN SERPL BCP-MCNC: 1.4 G/DL (ref 3.5–5.2)
ALP SERPL-CCNC: 123 U/L (ref 55–135)
ALT SERPL W/O P-5'-P-CCNC: 39 U/L (ref 10–44)
ANION GAP SERPL CALC-SCNC: 6 MMOL/L (ref 8–16)
AST SERPL-CCNC: 57 U/L (ref 10–40)
BASOPHILS # BLD AUTO: 0.04 K/UL (ref 0–0.2)
BASOPHILS NFR BLD: 0.4 % (ref 0–1.9)
BILIRUB SERPL-MCNC: 0.4 MG/DL (ref 0.1–1)
BILIRUB UR QL STRIP: NEGATIVE
BLD PROD TYP BPU: NORMAL
BLD PROD TYP BPU: NORMAL
BLOOD UNIT EXPIRATION DATE: NORMAL
BLOOD UNIT EXPIRATION DATE: NORMAL
BLOOD UNIT TYPE CODE: 7300
BLOOD UNIT TYPE CODE: 7300
BLOOD UNIT TYPE: NORMAL
BLOOD UNIT TYPE: NORMAL
BUN SERPL-MCNC: 15 MG/DL (ref 8–23)
CALCIUM SERPL-MCNC: 9.2 MG/DL (ref 8.7–10.5)
CHLORIDE SERPL-SCNC: 103 MMOL/L (ref 95–110)
CLARITY UR REFRACT.AUTO: ABNORMAL
CO2 SERPL-SCNC: 23 MMOL/L (ref 23–29)
CODING SYSTEM: NORMAL
CODING SYSTEM: NORMAL
COLOR UR AUTO: YELLOW
CREAT SERPL-MCNC: 0.8 MG/DL (ref 0.5–1.4)
CROSSMATCH INTERPRETATION: NORMAL
CROSSMATCH INTERPRETATION: NORMAL
DIFFERENTIAL METHOD BLD: ABNORMAL
DISPENSE STATUS: NORMAL
DISPENSE STATUS: NORMAL
EOSINOPHIL # BLD AUTO: 0 K/UL (ref 0–0.5)
EOSINOPHIL NFR BLD: 0.1 % (ref 0–8)
ERYTHROCYTE [DISTWIDTH] IN BLOOD BY AUTOMATED COUNT: 18.7 % (ref 11.5–14.5)
EST. GFR  (NO RACE VARIABLE): >60 ML/MIN/1.73 M^2
GLUCOSE SERPL-MCNC: 113 MG/DL (ref 70–110)
GLUCOSE UR QL STRIP: NEGATIVE
HCT VFR BLD AUTO: 23.5 % (ref 37–48.5)
HGB BLD-MCNC: 7.3 G/DL (ref 12–16)
HGB UR QL STRIP: NEGATIVE
IMM GRANULOCYTES # BLD AUTO: 0.23 K/UL (ref 0–0.04)
IMM GRANULOCYTES NFR BLD AUTO: 2.1 % (ref 0–0.5)
KETONES UR QL STRIP: NEGATIVE
LEUKOCYTE ESTERASE UR QL STRIP: NEGATIVE
LYMPHOCYTES # BLD AUTO: 1.5 K/UL (ref 1–4.8)
LYMPHOCYTES NFR BLD: 13.5 % (ref 18–48)
MCH RBC QN AUTO: 26 PG (ref 27–31)
MCHC RBC AUTO-ENTMCNC: 31.1 G/DL (ref 32–36)
MCV RBC AUTO: 84 FL (ref 82–98)
MONOCYTES # BLD AUTO: 0.9 K/UL (ref 0.3–1)
MONOCYTES NFR BLD: 8.6 % (ref 4–15)
NEUTROPHILS # BLD AUTO: 8.3 K/UL (ref 1.8–7.7)
NEUTROPHILS NFR BLD: 75.3 % (ref 38–73)
NITRITE UR QL STRIP: NEGATIVE
NRBC BLD-RTO: 0 /100 WBC
NUM UNITS TRANS PACKED RBC: NORMAL
NUM UNITS TRANS PACKED RBC: NORMAL
PH UR STRIP: 5 [PH] (ref 5–8)
PLATELET # BLD AUTO: 471 K/UL (ref 150–450)
PMV BLD AUTO: 9.1 FL (ref 9.2–12.9)
POTASSIUM SERPL-SCNC: 4.1 MMOL/L (ref 3.5–5.1)
PROT SERPL-MCNC: 5.5 G/DL (ref 6–8.4)
PROT UR QL STRIP: ABNORMAL
RBC # BLD AUTO: 2.81 M/UL (ref 4–5.4)
SODIUM SERPL-SCNC: 132 MMOL/L (ref 136–145)
SP GR UR STRIP: 1.02 (ref 1–1.03)
URN SPEC COLLECT METH UR: ABNORMAL
WBC # BLD AUTO: 10.99 K/UL (ref 3.9–12.7)

## 2024-01-24 PROCEDURE — 80053 COMPREHEN METABOLIC PANEL: CPT | Performed by: STUDENT IN AN ORGANIZED HEALTH CARE EDUCATION/TRAINING PROGRAM

## 2024-01-24 PROCEDURE — 97535 SELF CARE MNGMENT TRAINING: CPT

## 2024-01-24 PROCEDURE — 86920 COMPATIBILITY TEST SPIN: CPT | Performed by: STUDENT IN AN ORGANIZED HEALTH CARE EDUCATION/TRAINING PROGRAM

## 2024-01-24 PROCEDURE — 81003 URINALYSIS AUTO W/O SCOPE: CPT | Performed by: STUDENT IN AN ORGANIZED HEALTH CARE EDUCATION/TRAINING PROGRAM

## 2024-01-24 PROCEDURE — P9016 RBC LEUKOCYTES REDUCED: HCPCS | Performed by: STUDENT IN AN ORGANIZED HEALTH CARE EDUCATION/TRAINING PROGRAM

## 2024-01-24 PROCEDURE — 25000003 PHARM REV CODE 250: Performed by: NURSE PRACTITIONER

## 2024-01-24 PROCEDURE — 25000003 PHARM REV CODE 250: Performed by: STUDENT IN AN ORGANIZED HEALTH CARE EDUCATION/TRAINING PROGRAM

## 2024-01-24 PROCEDURE — 97530 THERAPEUTIC ACTIVITIES: CPT

## 2024-01-24 PROCEDURE — A4216 STERILE WATER/SALINE, 10 ML: HCPCS | Performed by: STUDENT IN AN ORGANIZED HEALTH CARE EDUCATION/TRAINING PROGRAM

## 2024-01-24 PROCEDURE — 27000207 HC ISOLATION

## 2024-01-24 PROCEDURE — 97530 THERAPEUTIC ACTIVITIES: CPT | Mod: CQ

## 2024-01-24 PROCEDURE — 20600001 HC STEP DOWN PRIVATE ROOM

## 2024-01-24 PROCEDURE — 63600175 PHARM REV CODE 636 W HCPCS: Performed by: STUDENT IN AN ORGANIZED HEALTH CARE EDUCATION/TRAINING PROGRAM

## 2024-01-24 PROCEDURE — 25000003 PHARM REV CODE 250: Performed by: ORTHOPAEDIC SURGERY

## 2024-01-24 PROCEDURE — 85025 COMPLETE CBC W/AUTO DIFF WBC: CPT | Performed by: STUDENT IN AN ORGANIZED HEALTH CARE EDUCATION/TRAINING PROGRAM

## 2024-01-24 PROCEDURE — 94761 N-INVAS EAR/PLS OXIMETRY MLT: CPT

## 2024-01-24 RX ORDER — OXYCODONE HYDROCHLORIDE 5 MG/1
5 TABLET ORAL
Status: DISCONTINUED | OUTPATIENT
Start: 2024-01-24 | End: 2024-01-29 | Stop reason: HOSPADM

## 2024-01-24 RX ORDER — HYDROMORPHONE HYDROCHLORIDE 1 MG/ML
1 INJECTION, SOLUTION INTRAMUSCULAR; INTRAVENOUS; SUBCUTANEOUS
Status: DISCONTINUED | OUTPATIENT
Start: 2024-01-24 | End: 2024-01-29 | Stop reason: HOSPADM

## 2024-01-24 RX ORDER — ACETAMINOPHEN 500 MG
1000 TABLET ORAL 3 TIMES DAILY
Status: DISCONTINUED | OUTPATIENT
Start: 2024-01-24 | End: 2024-01-29 | Stop reason: HOSPADM

## 2024-01-24 RX ORDER — HYDROCODONE BITARTRATE AND ACETAMINOPHEN 500; 5 MG/1; MG/1
TABLET ORAL
Status: DISCONTINUED | OUTPATIENT
Start: 2024-01-24 | End: 2024-01-28

## 2024-01-24 RX ORDER — OXYCODONE HYDROCHLORIDE 10 MG/1
10 TABLET ORAL
Status: DISCONTINUED | OUTPATIENT
Start: 2024-01-24 | End: 2024-01-29 | Stop reason: HOSPADM

## 2024-01-24 RX ADMIN — NYSTATIN 500000 UNITS: 100000 SUSPENSION ORAL at 08:01

## 2024-01-24 RX ADMIN — ESCITALOPRAM OXALATE 20 MG: 5 TABLET, FILM COATED ORAL at 08:01

## 2024-01-24 RX ADMIN — NYSTATIN 500000 UNITS: 100000 SUSPENSION ORAL at 05:01

## 2024-01-24 RX ADMIN — NYSTATIN 500000 UNITS: 100000 SUSPENSION ORAL at 09:01

## 2024-01-24 RX ADMIN — AMLODIPINE BESYLATE 5 MG: 5 TABLET ORAL at 08:01

## 2024-01-24 RX ADMIN — HYDROMORPHONE HYDROCHLORIDE 0.5 MG: 0.5 INJECTION, SOLUTION INTRAMUSCULAR; INTRAVENOUS; SUBCUTANEOUS at 08:01

## 2024-01-24 RX ADMIN — LIDOCAINE HYDROCHLORIDE 5 ML: 20 SOLUTION ORAL; TOPICAL at 09:01

## 2024-01-24 RX ADMIN — VANCOMYCIN HYDROCHLORIDE 1250 MG: 1.25 INJECTION, POWDER, LYOPHILIZED, FOR SOLUTION INTRAVENOUS at 02:01

## 2024-01-24 RX ADMIN — HYDROMORPHONE HYDROCHLORIDE 1 MG: 1 INJECTION, SOLUTION INTRAMUSCULAR; INTRAVENOUS; SUBCUTANEOUS at 01:01

## 2024-01-24 RX ADMIN — ACETAMINOPHEN 1000 MG: 500 TABLET ORAL at 02:01

## 2024-01-24 RX ADMIN — DOCUSATE SODIUM 100 MG: 100 CAPSULE, LIQUID FILLED ORAL at 08:01

## 2024-01-24 RX ADMIN — METOPROLOL TARTRATE 50 MG: 50 TABLET, FILM COATED ORAL at 08:01

## 2024-01-24 RX ADMIN — PREGABALIN 200 MG: 100 CAPSULE ORAL at 09:01

## 2024-01-24 RX ADMIN — APIXABAN 5 MG: 5 TABLET, FILM COATED ORAL at 08:01

## 2024-01-24 RX ADMIN — NYSTATIN 500000 UNITS: 100000 SUSPENSION ORAL at 12:01

## 2024-01-24 RX ADMIN — LIDOCAINE HYDROCHLORIDE 5 ML: 20 SOLUTION ORAL; TOPICAL at 02:01

## 2024-01-24 RX ADMIN — OXYCODONE HYDROCHLORIDE 10 MG: 10 TABLET ORAL at 12:01

## 2024-01-24 RX ADMIN — OXYCODONE HYDROCHLORIDE 10 MG: 10 TABLET ORAL at 08:01

## 2024-01-24 RX ADMIN — AMIODARONE HYDROCHLORIDE 200 MG: 200 TABLET ORAL at 08:01

## 2024-01-24 RX ADMIN — PREGABALIN 200 MG: 100 CAPSULE ORAL at 02:01

## 2024-01-24 RX ADMIN — Medication 10 ML: at 12:01

## 2024-01-24 RX ADMIN — Medication 10 ML: at 05:01

## 2024-01-24 RX ADMIN — VALACYCLOVIR HYDROCHLORIDE 1000 MG: 500 TABLET, FILM COATED ORAL at 09:01

## 2024-01-24 RX ADMIN — METHOCARBAMOL 500 MG: 500 TABLET ORAL at 09:01

## 2024-01-24 RX ADMIN — METOPROLOL TARTRATE 50 MG: 50 TABLET, FILM COATED ORAL at 09:01

## 2024-01-24 RX ADMIN — LIDOCAINE HYDROCHLORIDE 5 ML: 20 SOLUTION ORAL; TOPICAL at 10:01

## 2024-01-24 RX ADMIN — METHOCARBAMOL 500 MG: 500 TABLET ORAL at 08:01

## 2024-01-24 RX ADMIN — DOCUSATE SODIUM 100 MG: 100 CAPSULE, LIQUID FILLED ORAL at 09:01

## 2024-01-24 RX ADMIN — LIDOCAINE HYDROCHLORIDE 5 ML: 20 SOLUTION ORAL; TOPICAL at 05:01

## 2024-01-24 RX ADMIN — ACETAMINOPHEN 650 MG: 325 TABLET ORAL at 12:01

## 2024-01-24 RX ADMIN — ACETAMINOPHEN 1000 MG: 500 TABLET ORAL at 09:01

## 2024-01-24 RX ADMIN — VALACYCLOVIR HYDROCHLORIDE 1000 MG: 500 TABLET, FILM COATED ORAL at 08:01

## 2024-01-24 RX ADMIN — PREGABALIN 200 MG: 100 CAPSULE ORAL at 08:01

## 2024-01-24 RX ADMIN — APIXABAN 5 MG: 5 TABLET, FILM COATED ORAL at 09:01

## 2024-01-24 NOTE — PT/OT/SLP PROGRESS
"Occupational Therapy  Treatment    Name: Froilan Ray  MRN: [id number]  Admitting Diagnosis:  Sepsis due to methicillin resistant Staphylococcus aureus (MRSA)  5 Days Post-Op    Recommendations:     Discharge Recommendations: Moderate Intensity Therapy  Discharge Equipment Recommendations:  to be determined by next level of care  Barriers to discharge:  Other (Comment) (increased skilled A required)    Assessment:     Froilan Ray is a 78 y.o. female with a medical diagnosis of Sepsis due to methicillin resistant Staphylococcus aureus (MRSA).  She presents with performance deficits affecting function are weakness, impaired endurance, impaired self care skills, impaired functional mobility, gait instability, impaired balance, decreased coordination, decreased upper extremity function, decreased lower extremity function, decreased safety awareness, pain, decreased ROM, orthopedic precautions. Pt had just finished working with PT, but was agreeable to OT session. Pt performed bed mobility and EOB grooming and UBD. Pt spent increased time during grooming task per pt's preference, and requiring intermittent cues and assistance for sitting posture 2/2 posterior lean. After ADL tasks, pt c/o increased pain and declined transfer. Pt participates well overall and is motivated to regain functional independence in mobility and self care.       Rehab Prognosis:  Good; patient would benefit from acute skilled OT services to address these deficits and reach maximum level of function.       Plan:     Patient to be seen 4 x/week to address the above listed problems via self-care/home management, therapeutic activities, therapeutic exercises, neuromuscular re-education  Plan of Care Expires: 02/20/24  Plan of Care Reviewed with: patient    Subjective     Chief Complaint: "I was just up with PT."  Patient/Family Comments/goals: Get well  Pain/Comfort:  Pain Rating 1:  (did not rate)  Location - Side 1: Left  Location - " Orientation 1: generalized  Location 1: hip  Pain Addressed 1: Pre-medicate for activity, Reposition, Distraction, Cessation of Activity  Pain Rating Post-Intervention 1:  (did not rate)    Objective:     Communicated with: RN prior to session.  Patient found HOB elevated with bed alarm, wound vac, peripheral IV, telemetry, pulse ox (continuous), blood pressure cuff, BEULAH drain, PureWick upon OT entry to room.    General Precautions: Standard, fall    Orthopedic Precautions:LLE toe touch weight bearing  Braces: N/A  Respiratory Status: Nasal cannula, flow 3 L/min     Occupational Performance:     Bed Mobility:    Patient completed Scooting/Bridging with moderate assistance and use of trendelenburg to HOB  Patient completed Supine to Sit with maximal assistance  Patient completed Sit to Supine with maximal assistance     Functional Mobility/Transfers:  Declined     Activities of Daily Living:  Grooming: oral care EOB; pt required a range of SBA to Mod A for sitting balance, due to intermittent (and slow progressing) posterior lean; as pain increased, pt required more assistance  Upper Body Dressing: maximal assistance gown as robe; pt gave good effort, but ultimately required assistance for most of task.      [facility] 6 Click ADL: 12    Treatment & Education:  Pt edu re OT role, POC and safety.  Pt edu re TTWB, even during bed mobility.    Patient left  HOB elevated and LEs elevated  with all lines intact, call button in reach, and bed alarm on    GOALS:   Multidisciplinary Problems       Occupational Therapy Goals          Problem: Occupational Therapy    Goal Priority Disciplines Outcome Interventions   Occupational Therapy Goal     OT, PT/OT Ongoing, Progressing    Description: Goals to be met by:  2/20/2024    Patient will increase functional independence with ADLs by performing:    UE Dressing with Set-up Assistance.  LE Dressing with Minimal Assistance using AE as needed.  Grooming while seated with Modified  [place].  Toileting from bedside commode with Maximum Assistance for hygiene and clothing management.   Supine to sit with Minimal Assistance.  Stand pivot transfers with Minimal Assistance.  Toilet transfer to bedside commode with Minimal Assistance.  Upper extremity exercise program 2x10 reps, with supervision.                         Time Tracking:     OT Date of Treatment: 01/24/24  OT Start Time: 1533  OT Stop Time: 1557  OT Total Time (min): 24 min    Billable Minutes:Self Care/Home Management 13 minutes  Therapeutic Activity 11 minutes    OT/PREETI: OT       DELVIN Lopez  1/24/2024

## 2024-01-24 NOTE — ASSESSMENT & PLAN NOTE
PDMP reviewed. Will continue pain control for septic arthritis.   - cont MM pain regimen as well

## 2024-01-24 NOTE — PROGRESS NOTES
"[facility] - Telemetry [facility] Medicine  Progress Note    Patient Name: Froilan Ray  MRN: [id number]  Patient Class: IP- Inpatient   Admission Date: 1/17/2024  Length of Stay: 7 days  Attending Physician: Dianne Vila MD  Primary Care Provider: Alphonse Boothe III, MD        Subjective:     Principal Problem:Staphylococcal arthritis of left hip        HPI:  Froilan Ray is a 78 y.o. female with history of L hip surgery, HTN, HLD, CHF, chronic pain on opioids, peripheral neuropathy who was transferred from [facility] for concerns for septic joint.     Patient is a poor historian regarding timing and characterization of her symptoms. She reports that for "a long time" she has been "unable to do anything," basically lying in bed most of the day as she is unable to bear weight on her L leg due to hip pain. She reports chronic hip pain for which she takes "2 Percocets every 6 hours," however this has not relieved her pain for at least the past 2-3 weeks. She denies any fever, n/v, diarrhea, or other systemic symptoms except dry mouth.     At the OSH, she met sepsis criteria with leukocytosis and tachycardia. CT showed fluid around her L hip previous surgical site, concerning for septic arthritis. Was in AFib with RVR, and converted to sinus rhythm with cardizem and amiodarone infusions. She was given antibiotics and transferred here for Ortho evaluation.     Overview/Hospital Course:  Patient admitted for septic hip. S/p aspiration with purulent fluid removal, culture +Staph. Bcx+ MRSA. Started on vanc/zosyn- deescalated to vanc. Ortho consulted and following. S/p hip washout and debridement with Left hip proximal femoral resection with placement of antibiotic cement spacer, Removal of cephalomedullary nail left femur, Incision and drainage with irrigation deep abscess left buttock and thigh, Excisional debridement of fascia and muscle left hip. Drain and wound vac placed.   Noted painful lesions on " tongue after starting bactrim considering SJS vs HSV as lesions appear vesicular. Viscous lidocaine for mouth pain. Started empiric valtrex. HSV PCR indeterminate, HSV DNA detected but couldn't differentiate between 1 and 2. Cont valtrex.  ID consulted for abx recs, switch to dapto at discharge. Repeat Bcx NGTD.  PICC placed. Recommending MRI lumbar, no infection noted in lumbar spine.   PT/OT consulted. Patient refusing SNF.     Interval History: Mouth pain improving and PO intake improving. Reporting worsening hip pain.   Spiked temp to 100.7, on vanc for MRSA. Ortho aware. Cont drain and wound vac.   H/H still low transfuse another unit today.    Does not wasn't SNF, working on outpt abx infusions.       Review of Systems   Constitutional:  Negative for fever.   HENT:  Positive for mouth sores and trouble swallowing.    Respiratory:  Negative for shortness of breath.    Cardiovascular:  Negative for chest pain.   Gastrointestinal:  Negative for abdominal pain.   Genitourinary:  Negative for dysuria.   Musculoskeletal:  Positive for back pain and gait problem.        Left hip pain   Psychiatric/Behavioral:  Negative for confusion.      Objective:     Vital Signs (Most Recent):  Temp: 99.1 °F (37.3 °C) (01/24/24 1202)  Pulse: 85 (01/24/24 1245)  Resp: 19 (01/24/24 1256)  BP: 111/61 (01/24/24 1245)  SpO2: (!) 94 % (01/24/24 1245) Vital Signs (24h Range):  Temp:  [97.3 °F (36.3 °C)-100.7 °F (38.2 °C)] 99.1 °F (37.3 °C)  Pulse:  [27-92] 85  Resp:  [18-20] 19  SpO2:  [92 %-100 %] 94 %  BP: ()/(56-73) 111/61     Weight: 63.9 kg (140 lb 14 oz)  Body mass index is 22.74 kg/m².    Intake/Output Summary (Last 24 hours) at 1/24/2024 1302  Last data filed at 1/24/2024 0800  Gross per 24 hour   Intake 695 ml   Output 821 ml   Net -126 ml           Physical Exam  Vitals and nursing note reviewed.   Constitutional:       General: She is not in acute distress.     Appearance: She is ill-appearing.   HENT:      Head:  Normocephalic and atraumatic.      Nose: Nose normal.      Mouth/Throat:      Mouth: Mucous membranes are moist.      Comments: Gray vesicular lesions on tongue, and angular cheilitis noted. no white plaques inside mouth  Eyes:      Extraocular Movements: Extraocular movements intact.      Conjunctiva/sclera: Conjunctivae normal.      Pupils: Pupils are equal, round, and reactive to light.   Cardiovascular:      Rate and Rhythm: Normal rate and regular rhythm.      Pulses: Normal pulses.      Heart sounds: Normal heart sounds.   Pulmonary:      Effort: Pulmonary effort is normal.      Breath sounds: Normal breath sounds.   Abdominal:      General: Abdomen is flat. Bowel sounds are normal. There is no distension.      Palpations: Abdomen is soft.      Tenderness: There is no abdominal tenderness.   Musculoskeletal:      Cervical back: Normal range of motion and neck supple.      Left lower leg: Edema present.      Comments: S/p hip washout with dressing and drain in place, minimal sanguinous output, wound vac in place   Skin:     General: Skin is warm and dry.   Neurological:      General: No focal deficit present.      Mental Status: She is alert.   Psychiatric:         Mood and Affect: Mood normal.         Behavior: Behavior normal.             Significant Labs: All pertinent labs within the past 24 hours have been reviewed.    Significant Imaging: I have reviewed all pertinent imaging results/findings within the past 24 hours.    Assessment/Plan:      * Sepsis due to methicillin resistant Staphylococcus aureus (MRSA)  Initially presented to outside hospital with complaints of left hip pain and inability to ambulate, and met sepsis criteria with tachycardia (AFib with RVR) and leukocytosis.  Source is likely left hip septic arthritis.    Blood cultures obtained there, and lactate only mildly elevated which resolved with fluids.    We will continue vancomycin and Zosyn for presumed septic arthritis and consult  Orthopedics for further evaluation.  - S/P left hip aspiration with purulent drainage- culture + MRSA  - Bcx+ MRSA, repeat NGTD  - ECHO without vegetation  - ID consulted for antibiotic recs- recommend cont vanc, check dapto susceptibility - susceptible  - ortho following. S/p hip washout and debridement. Left hip proximal femoral resection with placement of antibiotic cement spacer, Removal of cephalomedullary nail left femur, Incision and drainage with irrigation deep abscess left buttock and thigh, Excisional debridement of fascia and muscle left hip. Drain and wound vac placed.   - s/p PICC line placement  - MRI lumbar without signs of infection   - switch to dapto at discharge per ID recs, outpt infusions to be set up   - patient spiking temp, ortho made aware cont drain and wound vac. CPR mild decreased and leukocytosis resolved     Oral lesion  - painful gray vesicular lesions noted on tongue  - previously thought to be thrush and patient started on nystatin  - does not appear to be thrush on my exam given appearance  - started few days after starting bactrim  - concern for SJS given proximity to bactrim use versus HSV lesions, PCR indeterminate, HSV DNA detected but unable to distinguish 1 or 2  - viscous lidocaine ordered   - cont empiric valtrex  - mouth pain/sores improving and tolerating PO    MRSA bacteremia  - Bcx+ MRSA, source likely left hip  - repeat Bcx NGTD  - no vegetation noted on ECHO  - cont vanc  - ID consulted for abx recs- cont vanc, switch to dapto at discharge - outpt infusions to be set up SW aware  - MRI lumbar without signs of infection       Staphylococcal arthritis of left hip  - see sepsis due to MRSA      Painful orthopaedic hardware  - septic hip joint with hardware in place  - ortho following   - see sepsis 2/2 MRSA      (HFpEF) heart failure with preserved ejection fraction  Monitor volume status. Control HTN.   - TTE: Left Ventricle: The left ventricle is normal in size. Normal  wall thickness. There is concentric remodeling. Normal wall motion. There is low normal systolic function with a visually estimated ejection fraction of 50 - 55%. There is normal diastolic function.    Right Ventricle: Normal right ventricular cavity size. Wall thickness is normal. Right ventricle wall motion  is normal. Systolic function is normal.    Aortic Valve: There is moderate aortic valve sclerosis. There is moderate annular calcification present.    Mitral Valve: There is severe mitral annular calcification present.    Aorta: Aortic root is normal in size measuring 3.37 cm. Ascending aorta is normal measuring 3.51 cm.    Pulmonary Artery: The estimated pulmonary artery systolic pressure is 25 mmHg.    IVC/SVC: Normal venous pressure at 3 mmHg.    Depression  Continue home Lexapro.        Atrial fibrillation with RVR  Patient has a history of atrial fibrillation and initially went into RVR at outside hospital.  Converted back to sinus rhythm with CCB and amiodarone infusions.  Cardiology was consulted and recommended continuing home amiodarone, metoprolol, and resuming home Eliquis (as patient was noncompliant).  We will monitor on telemetry   - monitor H/H given recent surgery and Hgb drop, minimal bleeding noted in drain and wound vac- cont eliquis for now given high CHADVASC and minimal bleeding noted in drain and wound vac      Iron deficiency anemia  Chronic, gets iron infusions  - hold iron infusions in active infection  - also blood loss anemia associated with recent hip surgery  - Monitor and transfuse for Hgb <7 or <8 with active bleeding  - Transfuse 1 unit 1/23, 1/24    Peripheral neuropathy  Continue home Lyrica.    Essential hypertension  Continue hospital formulary of home antihypertensives.     Chronic pain with uncomplicated opioid dependence  PDMP reviewed. Will continue pain control for septic arthritis.   - cont MM pain regimen as well        VTE Risk Mitigation (From admission, onward)            Ordered     apixaban tablet 5 mg  2 times daily         01/19/24 1446     Reason for No Pharmacological VTE Prophylaxis  Once        Question:  Reasons:  Answer:  Already adequately anticoagulated on oral Anticoagulants    01/17/24 2037     IP VTE HIGH RISK PATIENT  Once         01/17/24 2037     Place sequential compression device  Until discontinued         01/17/24 2037                    Discharge Planning   BAILEY: 1/26/2024     Code Status: Full Code   Is the patient medically ready for discharge?: No    Reason for patient still in hospital (select all that apply): Patient trending condition and Consult recommendations  Discharge Plan A: Skilled Nursing Facility   Discharge Delays: None known at this time              Dianne Vila MD  Department of Hospital Medicine   [facility] - Telemetry Stepdown

## 2024-01-24 NOTE — PLAN OF CARE
Problem: Adult Inpatient Plan of Care  Goal: Plan of Care Review  Outcome: Ongoing, Progressing  Goal: Absence of Hospital-Acquired Illness or Injury  Outcome: Ongoing, Progressing  Goal: Optimal Comfort and Wellbeing  Outcome: Ongoing, Progressing  Goal: Readiness for Transition of Care  Outcome: Ongoing, Progressing     Pt AAOx4. NSR on tele. VSS. Placed on 1L NC to keep O2 sats > 95%. Prn pain meds given. When pt is awake she continuously moans. Incision CDI with wound vac in place. Wound vac with 0 output. BEULAH drain with 5cc of output. Purewick in use. Safety maintained. Call bell within reach. Bed alarm in use.

## 2024-01-24 NOTE — ASSESSMENT & PLAN NOTE
Initially presented to outside hospital with complaints of left hip pain and inability to ambulate, and met sepsis criteria with tachycardia (AFib with RVR) and leukocytosis.  Source is likely left hip septic arthritis.    Blood cultures obtained there, and lactate only mildly elevated which resolved with fluids.    We will continue vancomycin and Zosyn for presumed septic arthritis and consult Orthopedics for further evaluation.  - S/P left hip aspiration with purulent drainage- culture + MRSA  - Bcx+ MRSA, repeat NGTD  - ECHO without vegetation  - ID consulted for antibiotic recs- recommend cont vanc, check dapto susceptibility - susceptible  - ortho following. S/p hip washout and debridement. Left hip proximal femoral resection with placement of antibiotic cement spacer, Removal of cephalomedullary nail left femur, Incision and drainage with irrigation deep abscess left buttock and thigh, Excisional debridement of fascia and muscle left hip. Drain and wound vac placed.   - s/p PICC line placement  - MRI lumbar without signs of infection   - switch to dapto at discharge per ID recs, outpt infusions to be set up   - patient spiking temp, ortho made aware cont drain and wound vac

## 2024-01-24 NOTE — ASSESSMENT & PLAN NOTE
- painful gray vesicular lesions noted on tongue  - previously thought to be thrush and patient started on nystatin  - does not appear to be thrush on my exam given appearance  - started few days after starting bactrim  - concern for SJS given proximity to bactrim use versus HSV lesions, PCR indeterminate, HSV DNA detected but unable to distinguish 1 or 2  - viscous lidocaine ordered   - cont empiric valtrex  - mouth pain/sores improving and tolerating PO

## 2024-01-24 NOTE — PROGRESS NOTES
[facility] - Telemetry Stepdown  Orthopedics  Progress Note    Patient Name: Froilan Ray  MRN: [id number]  Admission Date: 1/17/2024  Hospital Length of Stay: 7 days  Attending Provider: Dianne Vila MD  Primary Care Provider: Alphonse Boothe III, MD  Follow-up For: Procedure(s) (LRB):  ARTHROPLASTY, HIP, GIRDLESTONE, POSTERIOR APPROACH (Left)  IRRIGATION AND DEBRIDEMENT, LOWER EXTREMITY (Left)    Post-Operative Day: 5 Days Post-Op  Subjective:     Principal Problem:Staphylococcal arthritis of left hip    Principal Orthopedic Problem:  Left hip septic arthritis and gluteal/thigh abscess with remote history of cephalomedullary nail fixation left proximal femur.    Interval History:  Postoperative day 5. Status post removal of nail with washout of abscesses and proximal femoral resection with Prostalac antibiotic spacer placement.  Patient still with some pain.  Minimally ambulatory.  No acute adverse events.  Transfused 1 unit packed red blood cells yesterday.    Review of patient's allergies indicates:  No Known Allergies    Current Facility-Administered Medications   Medication    (Magic mouthwash) 1:1:1 diphenhydrAMINE(Benadryl) 12.5mg/5ml liq, aluminum & magnesium hydroxide-simethicone (Maalox), LIDOcaine viscous 2%    0.9%  NaCl infusion (for blood administration)    0.9%  NaCl infusion (for blood administration)    0.9%  NaCl infusion (for blood administration)    acetaminophen tablet 1,000 mg    aluminum-magnesium hydroxide-simethicone 200-200-20 mg/5 mL suspension 30 mL    amiodarone tablet 200 mg    amLODIPine tablet 5 mg    apixaban tablet 5 mg    docusate sodium capsule 100 mg    EScitalopram oxalate tablet 20 mg    guaiFENesin 100 mg/5 ml syrup 200 mg    oxyCODONE immediate release tablet 5 mg    And    oxyCODONE immediate release tablet Tab 10 mg    And    HYDROmorphone injection 1 mg    LIDOcaine viscous HCl 2% oral solution 5 mL    melatonin tablet 6 mg    methocarbamoL tablet 500 mg     "metoprolol tartrate (LOPRESSOR) tablet 50 mg    naloxone 0.4 mg/mL injection 0.02 mg    nystatin 100,000 unit/mL suspension 500,000 Units    ondansetron disintegrating tablet 8 mg    polyethylene glycol packet 17 g    pregabalin capsule 200 mg    simethicone chewable tablet 80 mg    sodium chloride 0.9% flush 1-10 mL    sodium chloride 0.9% flush 10 mL    And    sodium chloride 0.9% flush 10 mL    valACYclovir tablet 1,000 mg    vancomycin - pharmacy to dose    vancomycin 1,250 mg in dextrose 5 % (D5W) 250 mL IVPB (Vial-Mate)     Objective:     Vital Signs (Most Recent):  Temp: 99.1 °F (37.3 °C) (01/24/24 1202)  Pulse: 85 (01/24/24 1245)  Resp: 19 (01/24/24 1245)  BP: 111/61 (01/24/24 1245)  SpO2: (!) 94 % (01/24/24 1245) Vital Signs (24h Range):  Temp:  [97.3 °F (36.3 °C)-100.7 °F (38.2 °C)] 99.1 °F (37.3 °C)  Pulse:  [27-92] 85  Resp:  [18-20] 19  SpO2:  [92 %-100 %] 94 %  BP: ()/(56-73) 111/61     Weight: 63.9 kg (140 lb 14 oz)  Height: 5' 6" (167.6 cm)  Body mass index is 22.74 kg/m².      Intake/Output Summary (Last 24 hours) at 1/24/2024 1249  Last data filed at 1/24/2024 0800  Gross per 24 hour   Intake 695 ml   Output 821 ml   Net -126 ml        Ortho/SPM Exam    Dressing is clean dry and intact.  Drain is intact.  Drain output 60 cc in last 24 hours.  Serosanguineous without juan purulence.     Significant Labs: CBC:   Recent Labs   Lab 01/23/24  0448 01/24/24 0432   WBC 11.60 10.99   HGB 6.6* 7.3*   HCT 21.2* 23.5*   * 471*     CMP:   Recent Labs   Lab 01/23/24 0448 01/24/24  0432   * 132*   K 4.1 4.1    103   CO2 21* 23   GLU 95 113*   BUN 14 15   CREATININE 0.7 0.8   CALCIUM 9.2 9.2   PROT 5.5* 5.5*   ALBUMIN 1.4* 1.4*   BILITOT 0.4 0.4   ALKPHOS 131 123   AST 59* 57*   ALT 34 39   ANIONGAP 6* 6*     CRP:   Recent Labs   Lab 01/23/24  1053   .9*       Assessment/Plan:     * Staphylococcal arthritis of left hip  Hannahmanuelito RIP Ray is a 78 y.o. female w/PMH of HTN, HLD, " CHF, chronic pain on opioids, peripheral neuropathy, L-intertroch fx s/p TFNA (12/11/2018, Dr. Eulalio De La Cruz), who presents as transfer from OSH with large left hip fluid collection and XR showing femoral head collapse with cut-through of the TFNA helical blade. Blood cx + for MRSA, on vanc/ceftaroline, ID consulted. Pt underwent IR aspiration of L hip on 1/18 with 72cc of juan pus aspirated, gram stain + for gram positive cocci in clusters.    » s/p nail removal and abx spacer 1/19    Patient has down trending serum white blood cell count and CRP although slowly.  Given operative drain, does not have significant purulence remaining at present although she had a very large abscess to begin with.  Continue IV antibiotics.  Will leave drain in it present until output minimal given her significant initial infection burden.    Pain control: MM - I changed her Tylenol from p.r.n. to 1000 mg t.i.d. to add to her multimodal pain regimen.  Patient has a history of chronic opioid use.  PT/OT:  TTWB LLE  I changed her Tylenol from p.r.n. to 1000 mg t.i.d. to add to her multimodal pain regimen.  Patient has a history of chronic opioid use.  DVT PPx: eliquis 5 bid for a fib, SCDs at all times when not ambulating  Abx:  vanc, likely 6 weeks vanc with chronic suppression thereafter.   Cx: hip aspiration cx growing MRSA   Repeat blood cultures NGTD  PICC team consulted     » Dispo: pending final ID recs, drain removal    Bulmaro Tellez MD      Painful orthopaedic hardware  .          Bulmaro Tellez MD  Orthopedics  [facility] - Telemetry Stepdown

## 2024-01-24 NOTE — PROGRESS NOTES
Pharmacokinetic Assessment Follow Up: IV Vancomycin    Vancomycin serum concentration assessment(s):    The random level was drawn correctly and can be used to guide therapy at this time. The measurement is below the desired definitive target range of 15 to 20 mcg/mL.  - The random level was drawn ~23 hours after previous dose and resulted at 11.7.     Vancomycin Regimen Plan:    Change regimen to Vancomycin 1250 mg IV every 24 hours with next serum trough concentration measured at 2130 prior to 3rd dose on 1/25.  - Ms. Ray's renal function appears stable and back at her baseline. Her levels show that she's clearing vanc better than previously this admission.  - Will schedule a regimen, but will continue to watch her renal function closely to determine if pulse dosing is needed again.  - Please draw random level sooner than scheduled trough if renal function changes significantly.    Drug levels (last 3 results):  Recent Labs   Lab Result Units 01/21/24  1659 01/22/24  1713 01/23/24  1947   Vancomycin, Random ug/mL 18.3 10.2 11.7       Pharmacy will continue to follow and monitor vancomycin.    Please contact pharmacy at extension [phone] for questions regarding this assessment.    Thank you for the consult,   Jayde Ramos       Patient brief summary:  Froilan Ray is a 78 y.o. female initiated on antimicrobial therapy with IV Vancomycin for treatment of bone/joint infection    Actual Body Weight:   63.9 kg    Renal Function:   Estimated Creatinine Clearance: 64 mL/min (based on SCr of 0.7 mg/dL).     Dialysis Method (if applicable):  N/A

## 2024-01-24 NOTE — SUBJECTIVE & OBJECTIVE
Principal Problem:Staphylococcal arthritis of left hip    Principal Orthopedic Problem:  Left hip septic arthritis and gluteal/thigh abscess with remote history of cephalomedullary nail fixation left proximal femur.    Interval History:  Postoperative day 5. Status post removal of nail with washout of abscesses and proximal femoral resection with Prostalac antibiotic spacer placement.  Patient still with some pain.  Minimally ambulatory.  No acute adverse events.  Transfused 1 unit packed red blood cells yesterday.    Review of patient's allergies indicates:  No Known Allergies    Current Facility-Administered Medications   Medication    (Magic mouthwash) 1:1:1 diphenhydrAMINE(Benadryl) 12.5mg/5ml liq, aluminum & magnesium hydroxide-simethicone (Maalox), LIDOcaine viscous 2%    0.9%  NaCl infusion (for blood administration)    0.9%  NaCl infusion (for blood administration)    0.9%  NaCl infusion (for blood administration)    acetaminophen tablet 1,000 mg    aluminum-magnesium hydroxide-simethicone 200-200-20 mg/5 mL suspension 30 mL    amiodarone tablet 200 mg    amLODIPine tablet 5 mg    apixaban tablet 5 mg    docusate sodium capsule 100 mg    EScitalopram oxalate tablet 20 mg    guaiFENesin 100 mg/5 ml syrup 200 mg    oxyCODONE immediate release tablet 5 mg    And    oxyCODONE immediate release tablet Tab 10 mg    And    HYDROmorphone injection 1 mg    LIDOcaine viscous HCl 2% oral solution 5 mL    melatonin tablet 6 mg    methocarbamoL tablet 500 mg    metoprolol tartrate (LOPRESSOR) tablet 50 mg    naloxone 0.4 mg/mL injection 0.02 mg    nystatin 100,000 unit/mL suspension 500,000 Units    ondansetron disintegrating tablet 8 mg    polyethylene glycol packet 17 g    pregabalin capsule 200 mg    simethicone chewable tablet 80 mg    sodium chloride 0.9% flush 1-10 mL    sodium chloride 0.9% flush 10 mL    And    sodium chloride 0.9% flush 10 mL    valACYclovir tablet 1,000 mg    vancomycin - pharmacy to dose     "vancomycin 1,250 mg in dextrose 5 % (D5W) 250 mL IVPB (Vial-Mate)     Objective:     Vital Signs (Most Recent):  Temp: 99.1 °F (37.3 °C) (01/24/24 1202)  Pulse: 85 (01/24/24 1245)  Resp: 19 (01/24/24 1245)  BP: 111/61 (01/24/24 1245)  SpO2: (!) 94 % (01/24/24 1245) Vital Signs (24h Range):  Temp:  [97.3 °F (36.3 °C)-100.7 °F (38.2 °C)] 99.1 °F (37.3 °C)  Pulse:  [27-92] 85  Resp:  [18-20] 19  SpO2:  [92 %-100 %] 94 %  BP: ()/(56-73) 111/61     Weight: 63.9 kg (140 lb 14 oz)  Height: 5' 6" (167.6 cm)  Body mass index is 22.74 kg/m².      Intake/Output Summary (Last 24 hours) at 1/24/2024 1249  Last data filed at 1/24/2024 0800  Gross per 24 hour   Intake 695 ml   Output 821 ml   Net -126 ml        Ortho/SPM Exam    Dressing is clean dry and intact.  Drain is intact.  Drain output 60 cc in last 24 hours.  Serosanguineous without juan purulence.     Significant Labs: CBC:   Recent Labs   Lab 01/23/24  0448 01/24/24  0432   WBC 11.60 10.99   HGB 6.6* 7.3*   HCT 21.2* 23.5*   * 471*     CMP:   Recent Labs   Lab 01/23/24  0448 01/24/24  0432   * 132*   K 4.1 4.1    103   CO2 21* 23   GLU 95 113*   BUN 14 15   CREATININE 0.7 0.8   CALCIUM 9.2 9.2   PROT 5.5* 5.5*   ALBUMIN 1.4* 1.4*   BILITOT 0.4 0.4   ALKPHOS 131 123   AST 59* 57*   ALT 34 39   ANIONGAP 6* 6*     CRP:   Recent Labs   Lab 01/23/24  1053   .9*       "

## 2024-01-24 NOTE — ASSESSMENT & PLAN NOTE
Orthopedic Surgery following. S/p surgeries. Getting vancomycin. Infectious Disease recommended daptomycin at discharge. PICC placed.

## 2024-01-24 NOTE — ASSESSMENT & PLAN NOTE
Chronic, gets iron infusions  - hold iron infusions in active infection  - also blood loss anemia associated with recent hip surgery  - Monitor and transfuse for Hgb <7 or <8 with active bleeding  - Transfuse 1 unit 1/23, 1/24

## 2024-01-24 NOTE — ASSESSMENT & PLAN NOTE
Froilan Ray is a 78 y.o. female w/PMH of HTN, HLD, CHF, chronic pain on opioids, peripheral neuropathy, L-intertroch fx s/p TFNA (12/11/2018, Dr. Eulalio De La Cruz), who presents as transfer from OSH with large left hip fluid collection and XR showing femoral head collapse with cut-through of the TFNA helical blade. Blood cx + for MRSA, on vanc/ceftaroline, ID consulted. Pt underwent IR aspiration of L hip on 1/18 with 72cc of juan pus aspirated, gram stain + for gram positive cocci in clusters.    » s/p nail removal and abx spacer 1/19    Patient has down trending serum white blood cell count and CRP although slowly.  Given operative drain, does not have significant purulence remaining at present although she had a very large abscess to begin with.  Continue IV antibiotics.  Will leave drain in it present until operative minimal given her significant infection burden.    Pain control: MM  PT/OT:  TTWB LLE  I changed her Tylenol from p.r.n. to 1000 mg t.i.d. to add to her multimodal pain regimen.  Patient has a history of chronic opioid use.  DVT PPx: eliquis 5 bid for a fib, SCDs at all times when not ambulating  Abx:  vanc, likely 6 weeks vanc with chronic suppression thereafter.   Cx: hip aspiration cx growing MRSA   Repeat blood cultures NGTD  PICC team consulted     » Dispo: pending final ID recs, drain removal

## 2024-01-24 NOTE — PLAN OF CARE
[facility] - Telemetry Stepdown  Discharge Reassessment    Primary Care Provider: Alphonse Boothe III, MD    Expected Discharge Date: 1/26/2024    Reassessment (most recent)       Discharge Reassessment - 01/24/24 1159          Discharge Reassessment    Assessment Type Discharge Planning Reassessment     Did the patient's condition or plan change since previous assessment? No     Discharge Plan A Skilled Nursing Facility     Discharge Plan B Home with family;Home Health     DME Needed Upon Discharge  none     Transition of Care Barriers None     Why the patient remains in the hospital Requires continued medical care        Post-Acute Status    Post-Acute Authorization Placement;Home Health;IV Infusion     Post-Acute Placement Status Patient declined/refused     Home Health Status Pending medical clearance/testing   [facility]    IV Infusion Status Referral(s) sent   [email or web address]    Discharge Delays None known at this time                 Discharge Plan A and Plan B have been determined by review of patient's clinical status, future medical and therapeutic needs, and coverage/benefits for post-acute care in coordination with multidisciplinary team members.    [facility] reports they are not in network with insurance. Referral sent to [email or web address] and spoke with Ewa.    Indira Mckinley, LCSW [facility]  Ext. [phone]

## 2024-01-24 NOTE — PT/OT/SLP PROGRESS
Physical Therapy Treatment    Patient Name:  Froilan Ray   MRN:  [id number]    Recommendations:     Discharge Recommendations: Moderate Intensity Therapy  Discharge Equipment Recommendations: to be determined by next level of care  Barriers to discharge: Pt requiring increased skilled assistance at current time.  Evolving clinical presentation    Assessment:     Froilan Ray is a 78 y.o. female admitted with a medical diagnosis of Sepsis due to methicillin resistant Staphylococcus aureus (MRSA).  She presents with the following impairments/functional limitations: weakness, impaired endurance, impaired self care skills, impaired functional mobility, gait instability, impaired balance, decreased upper extremity function, decreased lower extremity function, decreased safety awareness, pain, impaired skin, edema, impaired cardiopulmonary response to activity requiring max assistance and verbal cues for bed mob, scooting to/along the EOB due to weakness, pain, fatigue.   In light of pt's current functional level and deficits, it is anticipated that pt will need to participate in a moderate intensity rehab program consisting of PT and OT in order to achieve full rehab potential to return to previous level of function and roles.  Pt remains motivated to participate in PT session and will cont to benefit from skilled PT intervention..    Rehab Prognosis: Fair; patient would benefit from acute skilled PT services to address these deficits and reach maximum level of function.    Recent Surgery: Procedure(s) (LRB):  ARTHROPLASTY, HIP, GIRDLESTONE, POSTERIOR APPROACH (Left)  IRRIGATION AND DEBRIDEMENT, LOWER EXTREMITY (Left) 5 Days Post-Op    Plan:     During this hospitalization, patient to be seen 4 x/week to address the identified rehab impairments via gait training, therapeutic activities, therapeutic exercises, neuromuscular re-education and progress toward the following goals:    Plan of Care Expires:   02/20/24    Subjective     Chief Complaint: pain  Pain/Comfort:  Pain Rating 1:  (no rating provided)  Location - Side 1: Left  Location - Orientation 1: generalized  Location 1: leg  Pain Addressed 1: Pre-medicate for activity, Reposition, Distraction, Cessation of Activity  Pain Rating Post-Intervention 1: 9/10      Objective:     Communicated with nurse (Peyton) prior to session.  Patient found HOB elevated at 30* angle with bed alarm, BEULAH drain, oxygen, PureWick, peripheral IV, pulse ox (continuous), telemetry, wound vac (no family present) upon PT entry to room.     General Precautions: Standard, fall  Orthopedic Precautions: LLE toe touch weight bearing  Braces: N/A  Respiratory Status: Nasal cannula, flow 3 L/min     Functional Mobility:  Bed Mobility:     Rolling Left:  max A of 1 helper, HOB flat, with use of rail  Rolling Right: max A of 1 helper, HOB flat, with use of rail  Scooting: anteriorly to th EOB with max A of 1 helper with use of pad; alongside the EOB to the L with mod A x3 scoots  Supine to Sit: max A of 1 helper for trunk elevation and LE's, exiting on the L side, HOB flat  Sit to Supine: max A of 1 helper for trunk and LE's, HOB flat  Transfers:     Sit to Stand:   from EOB NA due to pt with c/o pain    Balance: static sitting at the EOB initially with mod/min A then with SBA 14 min      AM-PAC 6 CLICK MOBILITY  Turning over in bed (including adjusting bedclothes, sheets and blankets)?: 2  Sitting down on and standing up from a chair with arms (e.g., wheelchair, bedside commode, etc.): 1  Moving from lying on back to sitting on the side of the bed?: 2  Moving to and from a bed to a chair (including a wheelchair)?: 1  Need to walk in hospital room?: 1  Climbing 3-5 steps with a railing?: 1  Basic Mobility Total Score: 8       Treatment & Education:  Patient provided with daily orientation and goals of this PT session. They were educated to call for assistance and to transfer with hospital  staff only.  Also, pt was educated on the effects of prolonged immobility and the importance of performing OOB activity and exercises to promote healing and reduce recovery time    Patient left HOB elevated with all lines intact, call button in reach, bed alarm on, and nurse present..    GOALS:   Multidisciplinary Problems       Physical Therapy Goals          Problem: Physical Therapy    Goal Priority Disciplines Outcome Goal Variances Interventions   Physical Therapy Goal     PT, PT/OT Ongoing, Progressing     Description: Goals to be met by: 24     Patient will increase functional independence with mobility by performin. Supine to sit with Contact Guard Assistance  2. Sit to supine with Contact Guard Assistance  3. Sit to stand transfer with Minimal Assistance  4. Bed to chair transfer with Minimal Assistance using Rolling Walker  5. Gait  x 100 feet with Minimal Assistance using Rolling Walker.                          Time Tracking:     PT Received On: 24  PT Start Time: 1455     PT Stop Time: 1525  PT Total Time (min): 30 min     Billable Minutes: Therapeutic Activity 30    Treatment Type: Treatment  PT/PTA: PTA     Number of PTA visits since last PT visit: 2024

## 2024-01-24 NOTE — PLAN OF CARE
Problem: Adult Inpatient Plan of Care  Goal: Plan of Care Review  Outcome: Ongoing, Progressing  Goal: Patient-Specific Goal (Individualized)  Outcome: Ongoing, Progressing  Goal: Absence of Hospital-Acquired Illness or Injury  Outcome: Ongoing, Progressing  Goal: Optimal Comfort and Wellbeing  Outcome: Ongoing, Progressing  Goal: Readiness for Transition of Care  Outcome: Ongoing, Progressing     Problem: Adjustment to Illness (Sepsis/Septic Shock)  Goal: Optimal Coping  Outcome: Ongoing, Progressing     Problem: Bleeding (Sepsis/Septic Shock)  Goal: Absence of Bleeding  Outcome: Ongoing, Progressing     Problem: Glycemic Control Impaired (Sepsis/Septic Shock)  Goal: Blood Glucose Level Within Desired Range  Outcome: Ongoing, Progressing     Problem: Infection Progression (Sepsis/Septic Shock)  Goal: Absence of Infection Signs and Symptoms  Outcome: Ongoing, Progressing     Problem: Nutrition Impaired (Sepsis/Septic Shock)  Goal: Optimal Nutrition Intake  Outcome: Ongoing, Progressing     Problem: Fluid and Electrolyte Imbalance (Acute Kidney Injury/Impairment)  Goal: Fluid and Electrolyte Balance  Outcome: Ongoing, Progressing     Problem: Oral Intake Inadequate (Acute Kidney Injury/Impairment)  Goal: Optimal Nutrition Intake  Outcome: Ongoing, Progressing     Problem: Renal Function Impairment (Acute Kidney Injury/Impairment)  Goal: Effective Renal Function  Outcome: Ongoing, Progressing     Problem: Skin Injury Risk Increased  Goal: Skin Health and Integrity  Outcome: Ongoing, Progressing     Problem: Fall Injury Risk  Goal: Absence of Fall and Fall-Related Injury  Outcome: Ongoing, Progressing     Problem: Infection  Goal: Absence of Infection Signs and Symptoms  Outcome: Ongoing, Progressing   Pt. Laying in bed resp even and unlabored no distress noted at this time she received 1 unit of PRBCs hemoglobin 7.3 tolerated well she continues on contact precautions for MRSA in his wounds she continues to get her  prn pain meds Q 3hrs and prn pain safety precautions maintained.

## 2024-01-24 NOTE — ASSESSMENT & PLAN NOTE
- Bcx+ MRSA, source likely left hip  - repeat Bcx NGTD  - no vegetation noted on ECHO  - cont vanc  - ID consulted for abx recs- cont vanc, switch to dapto at discharge - outpt infusions to be set up SW aware  - MRI lumbar without signs of infection

## 2024-01-25 ENCOUNTER — ANESTHESIA EVENT (OUTPATIENT)
Dept: SURGERY | Facility: HOSPITAL | Age: 79
DRG: 480 | End: 2024-01-25
Payer: MEDICARE

## 2024-01-25 ENCOUNTER — ANESTHESIA (OUTPATIENT)
Dept: SURGERY | Facility: HOSPITAL | Age: 79
DRG: 480 | End: 2024-01-25
Payer: MEDICARE

## 2024-01-25 PROBLEM — I48.20 CHRONIC ATRIAL FIBRILLATION WITH RVR: Chronic | Status: ACTIVE | Noted: 2022-06-16

## 2024-01-25 LAB
ALBUMIN SERPL BCP-MCNC: 1.3 G/DL (ref 3.5–5.2)
ALP SERPL-CCNC: 145 U/L (ref 55–135)
ALT SERPL W/O P-5'-P-CCNC: 34 U/L (ref 10–44)
ANION GAP SERPL CALC-SCNC: 7 MMOL/L (ref 8–16)
AST SERPL-CCNC: 46 U/L (ref 10–40)
BASOPHILS # BLD AUTO: 0.04 K/UL (ref 0–0.2)
BASOPHILS NFR BLD: 0.3 % (ref 0–1.9)
BILIRUB SERPL-MCNC: 0.4 MG/DL (ref 0.1–1)
BLD PROD TYP BPU: NORMAL
BLOOD UNIT EXPIRATION DATE: NORMAL
BLOOD UNIT TYPE CODE: 7300
BLOOD UNIT TYPE: NORMAL
BUN SERPL-MCNC: 24 MG/DL (ref 8–23)
CALCIUM SERPL-MCNC: 7.7 MG/DL (ref 8.7–10.5)
CHLORIDE SERPL-SCNC: 101 MMOL/L (ref 95–110)
CO2 SERPL-SCNC: 23 MMOL/L (ref 23–29)
CODING SYSTEM: NORMAL
CREAT SERPL-MCNC: 1.2 MG/DL (ref 0.5–1.4)
CROSSMATCH INTERPRETATION: NORMAL
CRP SERPL-MCNC: 284.6 MG/L (ref 0–8.2)
DIFFERENTIAL METHOD BLD: ABNORMAL
DISPENSE STATUS: NORMAL
EOSINOPHIL # BLD AUTO: 0 K/UL (ref 0–0.5)
EOSINOPHIL NFR BLD: 0 % (ref 0–8)
ERYTHROCYTE [DISTWIDTH] IN BLOOD BY AUTOMATED COUNT: 18.3 % (ref 11.5–14.5)
EST. GFR  (NO RACE VARIABLE): 46.9 ML/MIN/1.73 M^2
FINAL PATHOLOGIC DIAGNOSIS: NORMAL
GLUCOSE SERPL-MCNC: 130 MG/DL (ref 70–110)
GROSS: NORMAL
HCT VFR BLD AUTO: 22.3 % (ref 37–48.5)
HGB BLD-MCNC: 7.1 G/DL (ref 12–16)
IMM GRANULOCYTES # BLD AUTO: 0.22 K/UL (ref 0–0.04)
IMM GRANULOCYTES NFR BLD AUTO: 1.5 % (ref 0–0.5)
LYMPHOCYTES # BLD AUTO: 1.8 K/UL (ref 1–4.8)
LYMPHOCYTES NFR BLD: 12 % (ref 18–48)
Lab: NORMAL
MCH RBC QN AUTO: 26.8 PG (ref 27–31)
MCHC RBC AUTO-ENTMCNC: 31.8 G/DL (ref 32–36)
MCV RBC AUTO: 84 FL (ref 82–98)
MONOCYTES # BLD AUTO: 0.9 K/UL (ref 0.3–1)
MONOCYTES NFR BLD: 6.4 % (ref 4–15)
NEUTROPHILS # BLD AUTO: 11.7 K/UL (ref 1.8–7.7)
NEUTROPHILS NFR BLD: 79.8 % (ref 38–73)
NRBC BLD-RTO: 0 /100 WBC
NUM UNITS TRANS PACKED RBC: NORMAL
OSMOLALITY SERPL: 285 MOSM/KG (ref 275–295)
PLATELET # BLD AUTO: 475 K/UL (ref 150–450)
PMV BLD AUTO: 9.9 FL (ref 9.2–12.9)
POTASSIUM SERPL-SCNC: 4.8 MMOL/L (ref 3.5–5.1)
PROT SERPL-MCNC: 5.5 G/DL (ref 6–8.4)
RBC # BLD AUTO: 2.65 M/UL (ref 4–5.4)
SODIUM SERPL-SCNC: 131 MMOL/L (ref 136–145)
WBC # BLD AUTO: 14.6 K/UL (ref 3.9–12.7)

## 2024-01-25 PROCEDURE — 0S9B0ZZ DRAINAGE OF LEFT HIP JOINT, OPEN APPROACH: ICD-10-PCS | Performed by: ORTHOPAEDIC SURGERY

## 2024-01-25 PROCEDURE — 37000008 HC ANESTHESIA 1ST 15 MINUTES: Performed by: ORTHOPAEDIC SURGERY

## 2024-01-25 PROCEDURE — 80202 ASSAY OF VANCOMYCIN: CPT | Performed by: STUDENT IN AN ORGANIZED HEALTH CARE EDUCATION/TRAINING PROGRAM

## 2024-01-25 PROCEDURE — 63600175 PHARM REV CODE 636 W HCPCS: Performed by: ANESTHESIOLOGY

## 2024-01-25 PROCEDURE — 86140 C-REACTIVE PROTEIN: CPT | Performed by: STUDENT IN AN ORGANIZED HEALTH CARE EDUCATION/TRAINING PROGRAM

## 2024-01-25 PROCEDURE — 25000003 PHARM REV CODE 250: Performed by: STUDENT IN AN ORGANIZED HEALTH CARE EDUCATION/TRAINING PROGRAM

## 2024-01-25 PROCEDURE — 63600175 PHARM REV CODE 636 W HCPCS: Performed by: STUDENT IN AN ORGANIZED HEALTH CARE EDUCATION/TRAINING PROGRAM

## 2024-01-25 PROCEDURE — 36415 COLL VENOUS BLD VENIPUNCTURE: CPT | Mod: XB

## 2024-01-25 PROCEDURE — 25000003 PHARM REV CODE 250: Performed by: ORTHOPAEDIC SURGERY

## 2024-01-25 PROCEDURE — 63600175 PHARM REV CODE 636 W HCPCS: Performed by: ORTHOPAEDIC SURGERY

## 2024-01-25 PROCEDURE — C1713 ANCHOR/SCREW BN/BN,TIS/BN: HCPCS | Performed by: ORTHOPAEDIC SURGERY

## 2024-01-25 PROCEDURE — 27000207 HC ISOLATION

## 2024-01-25 PROCEDURE — 36415 COLL VENOUS BLD VENIPUNCTURE: CPT | Performed by: STUDENT IN AN ORGANIZED HEALTH CARE EDUCATION/TRAINING PROGRAM

## 2024-01-25 PROCEDURE — C1729 CATH, DRAINAGE: HCPCS | Performed by: ORTHOPAEDIC SURGERY

## 2024-01-25 PROCEDURE — 85025 COMPLETE CBC W/AUTO DIFF WBC: CPT | Performed by: STUDENT IN AN ORGANIZED HEALTH CARE EDUCATION/TRAINING PROGRAM

## 2024-01-25 PROCEDURE — 27033 ARTHRT HIP EXPL/RMV LOOSE/FB: CPT | Mod: 58,LT,GC, | Performed by: ORTHOPAEDIC SURGERY

## 2024-01-25 PROCEDURE — 86920 COMPATIBILITY TEST SPIN: CPT | Performed by: STUDENT IN AN ORGANIZED HEALTH CARE EDUCATION/TRAINING PROGRAM

## 2024-01-25 PROCEDURE — 83930 ASSAY OF BLOOD OSMOLALITY: CPT

## 2024-01-25 PROCEDURE — 26990 DRAINAGE OF PELVIS LESION: CPT | Mod: 58,51,LT,GC | Performed by: ORTHOPAEDIC SURGERY

## 2024-01-25 PROCEDURE — P9016 RBC LEUKOCYTES REDUCED: HCPCS | Performed by: STUDENT IN AN ORGANIZED HEALTH CARE EDUCATION/TRAINING PROGRAM

## 2024-01-25 PROCEDURE — D9220A PRA ANESTHESIA: Mod: CRNA,,, | Performed by: STUDENT IN AN ORGANIZED HEALTH CARE EDUCATION/TRAINING PROGRAM

## 2024-01-25 PROCEDURE — A4216 STERILE WATER/SALINE, 10 ML: HCPCS | Performed by: STUDENT IN AN ORGANIZED HEALTH CARE EDUCATION/TRAINING PROGRAM

## 2024-01-25 PROCEDURE — 87075 CULTR BACTERIA EXCEPT BLOOD: CPT | Mod: 59 | Performed by: HOSPITALIST

## 2024-01-25 PROCEDURE — 36000710: Performed by: ORTHOPAEDIC SURGERY

## 2024-01-25 PROCEDURE — 3E0U029 INTRODUCTION OF OTHER ANTI-INFECTIVE INTO JOINTS, OPEN APPROACH: ICD-10-PCS | Performed by: ORTHOPAEDIC SURGERY

## 2024-01-25 PROCEDURE — 87070 CULTURE OTHR SPECIMN AEROBIC: CPT | Mod: 59 | Performed by: HOSPITALIST

## 2024-01-25 PROCEDURE — 36000711: Performed by: ORTHOPAEDIC SURGERY

## 2024-01-25 PROCEDURE — 71000015 HC POSTOP RECOV 1ST HR: Performed by: ORTHOPAEDIC SURGERY

## 2024-01-25 PROCEDURE — 37000009 HC ANESTHESIA EA ADD 15 MINS: Performed by: ORTHOPAEDIC SURGERY

## 2024-01-25 PROCEDURE — D9220A PRA ANESTHESIA: Mod: ANES,,, | Performed by: ANESTHESIOLOGY

## 2024-01-25 PROCEDURE — 71000033 HC RECOVERY, INTIAL HOUR: Performed by: ORTHOPAEDIC SURGERY

## 2024-01-25 PROCEDURE — 27201423 OPTIME MED/SURG SUP & DEVICES STERILE SUPPLY: Performed by: ORTHOPAEDIC SURGERY

## 2024-01-25 PROCEDURE — 80053 COMPREHEN METABOLIC PANEL: CPT | Performed by: STUDENT IN AN ORGANIZED HEALTH CARE EDUCATION/TRAINING PROGRAM

## 2024-01-25 PROCEDURE — 87040 BLOOD CULTURE FOR BACTERIA: CPT | Mod: 59

## 2024-01-25 PROCEDURE — 36415 COLL VENOUS BLD VENIPUNCTURE: CPT | Mod: XB | Performed by: STUDENT IN AN ORGANIZED HEALTH CARE EDUCATION/TRAINING PROGRAM

## 2024-01-25 PROCEDURE — 20600001 HC STEP DOWN PRIVATE ROOM

## 2024-01-25 DEVICE — RESORBABLE BEAD KIT - FAST CURE
Type: IMPLANTABLE DEVICE | Site: HIP | Status: FUNCTIONAL
Brand: OSTEOSET

## 2024-01-25 RX ORDER — VASOPRESSIN 20 [USP'U]/ML
INJECTION, SOLUTION INTRAMUSCULAR; SUBCUTANEOUS
Status: DISCONTINUED | OUTPATIENT
Start: 2024-01-25 | End: 2024-01-25

## 2024-01-25 RX ORDER — DEXAMETHASONE SODIUM PHOSPHATE 4 MG/ML
INJECTION, SOLUTION INTRA-ARTICULAR; INTRALESIONAL; INTRAMUSCULAR; INTRAVENOUS; SOFT TISSUE
Status: DISCONTINUED | OUTPATIENT
Start: 2024-01-25 | End: 2024-01-25

## 2024-01-25 RX ORDER — HYDROMORPHONE HYDROCHLORIDE 1 MG/ML
0.2 INJECTION, SOLUTION INTRAMUSCULAR; INTRAVENOUS; SUBCUTANEOUS EVERY 5 MIN PRN
Status: DISCONTINUED | OUTPATIENT
Start: 2024-01-25 | End: 2024-01-25 | Stop reason: HOSPADM

## 2024-01-25 RX ORDER — LIDOCAINE HYDROCHLORIDE 20 MG/ML
INJECTION INTRAVENOUS
Status: DISCONTINUED | OUTPATIENT
Start: 2024-01-25 | End: 2024-01-25

## 2024-01-25 RX ORDER — DILTIAZEM HCL/D5W 125 MG/125
10 PLASTIC BAG, INJECTION (ML) INTRAVENOUS CONTINUOUS
Status: DISCONTINUED | OUTPATIENT
Start: 2024-01-25 | End: 2024-01-25

## 2024-01-25 RX ORDER — PROPOFOL 10 MG/ML
VIAL (ML) INTRAVENOUS
Status: DISCONTINUED | OUTPATIENT
Start: 2024-01-25 | End: 2024-01-25

## 2024-01-25 RX ORDER — MORPHINE SULFATE 2 MG/ML
2 INJECTION, SOLUTION INTRAMUSCULAR; INTRAVENOUS EVERY 4 HOURS PRN
Status: DISCONTINUED | OUTPATIENT
Start: 2024-01-25 | End: 2024-01-25

## 2024-01-25 RX ORDER — HALOPERIDOL 5 MG/ML
0.5 INJECTION INTRAMUSCULAR EVERY 10 MIN PRN
Status: DISCONTINUED | OUTPATIENT
Start: 2024-01-25 | End: 2024-01-25 | Stop reason: HOSPADM

## 2024-01-25 RX ORDER — FENTANYL CITRATE 50 UG/ML
INJECTION, SOLUTION INTRAMUSCULAR; INTRAVENOUS
Status: DISCONTINUED | OUTPATIENT
Start: 2024-01-25 | End: 2024-01-25

## 2024-01-25 RX ORDER — MUPIROCIN 20 MG/G
OINTMENT TOPICAL
Status: DISCONTINUED | OUTPATIENT
Start: 2024-01-25 | End: 2024-01-25 | Stop reason: HOSPADM

## 2024-01-25 RX ORDER — OXYCODONE HYDROCHLORIDE 5 MG/1
5 TABLET ORAL
Status: DISCONTINUED | OUTPATIENT
Start: 2024-01-25 | End: 2024-01-25 | Stop reason: HOSPADM

## 2024-01-25 RX ORDER — PHENYLEPHRINE HYDROCHLORIDE 10 MG/ML
INJECTION INTRAVENOUS
Status: DISCONTINUED | OUTPATIENT
Start: 2024-01-25 | End: 2024-01-25

## 2024-01-25 RX ORDER — CEFAZOLIN SODIUM 1 G/3ML
INJECTION, POWDER, FOR SOLUTION INTRAMUSCULAR; INTRAVENOUS
Status: DISCONTINUED | OUTPATIENT
Start: 2024-01-25 | End: 2024-01-25

## 2024-01-25 RX ORDER — VANCOMYCIN HYDROCHLORIDE 1 G/20ML
INJECTION, POWDER, LYOPHILIZED, FOR SOLUTION INTRAVENOUS
Status: DISCONTINUED | OUTPATIENT
Start: 2024-01-25 | End: 2024-01-25 | Stop reason: HOSPADM

## 2024-01-25 RX ORDER — SODIUM CHLORIDE 0.9 % (FLUSH) 0.9 %
10 SYRINGE (ML) INJECTION
Status: DISCONTINUED | OUTPATIENT
Start: 2024-01-25 | End: 2024-01-29 | Stop reason: HOSPADM

## 2024-01-25 RX ORDER — ROCURONIUM BROMIDE 10 MG/ML
INJECTION, SOLUTION INTRAVENOUS
Status: DISCONTINUED | OUTPATIENT
Start: 2024-01-25 | End: 2024-01-25

## 2024-01-25 RX ORDER — TOBRAMYCIN 40 MG/ML
INJECTION INTRAMUSCULAR; INTRAVENOUS
Status: DISCONTINUED | OUTPATIENT
Start: 2024-01-25 | End: 2024-01-25 | Stop reason: HOSPADM

## 2024-01-25 RX ORDER — TOBRAMYCIN 1.2 G/30ML
INJECTION, POWDER, LYOPHILIZED, FOR SOLUTION INTRAVENOUS
Status: DISCONTINUED | OUTPATIENT
Start: 2024-01-25 | End: 2024-01-25 | Stop reason: HOSPADM

## 2024-01-25 RX ORDER — ONDANSETRON HYDROCHLORIDE 2 MG/ML
INJECTION, SOLUTION INTRAVENOUS
Status: DISCONTINUED | OUTPATIENT
Start: 2024-01-25 | End: 2024-01-25

## 2024-01-25 RX ADMIN — ONDANSETRON 4 MG: 2 INJECTION INTRAMUSCULAR; INTRAVENOUS at 11:01

## 2024-01-25 RX ADMIN — AMLODIPINE BESYLATE 5 MG: 5 TABLET ORAL at 09:01

## 2024-01-25 RX ADMIN — LIDOCAINE HYDROCHLORIDE 5 ML: 20 SOLUTION ORAL; TOPICAL at 04:01

## 2024-01-25 RX ADMIN — VASOPRESSIN 1 UNITS: 20 INJECTION INTRAVENOUS at 11:01

## 2024-01-25 RX ADMIN — LIDOCAINE HYDROCHLORIDE 5 ML: 20 SOLUTION ORAL; TOPICAL at 03:01

## 2024-01-25 RX ADMIN — Medication 6 MG: at 08:01

## 2024-01-25 RX ADMIN — APIXABAN 5 MG: 5 TABLET, FILM COATED ORAL at 08:01

## 2024-01-25 RX ADMIN — HYDROMORPHONE HYDROCHLORIDE 0.2 MG: 1 INJECTION, SOLUTION INTRAMUSCULAR; INTRAVENOUS; SUBCUTANEOUS at 01:01

## 2024-01-25 RX ADMIN — FENTANYL CITRATE 25 MCG: 50 INJECTION, SOLUTION INTRAMUSCULAR; INTRAVENOUS at 10:01

## 2024-01-25 RX ADMIN — DEXAMETHASONE SODIUM PHOSPHATE 4 MG: 4 INJECTION, SOLUTION INTRAMUSCULAR; INTRAVENOUS at 10:01

## 2024-01-25 RX ADMIN — OXYCODONE HYDROCHLORIDE 10 MG: 10 TABLET ORAL at 08:01

## 2024-01-25 RX ADMIN — NYSTATIN 500000 UNITS: 100000 SUSPENSION ORAL at 02:01

## 2024-01-25 RX ADMIN — Medication 10 ML: at 05:01

## 2024-01-25 RX ADMIN — VALACYCLOVIR HYDROCHLORIDE 1000 MG: 500 TABLET, FILM COATED ORAL at 09:01

## 2024-01-25 RX ADMIN — ACETAMINOPHEN 1000 MG: 500 TABLET ORAL at 03:01

## 2024-01-25 RX ADMIN — VALACYCLOVIR HYDROCHLORIDE 1000 MG: 500 TABLET, FILM COATED ORAL at 08:01

## 2024-01-25 RX ADMIN — LIDOCAINE HYDROCHLORIDE 80 MG: 20 INJECTION INTRAVENOUS at 10:01

## 2024-01-25 RX ADMIN — PHENYLEPHRINE HYDROCHLORIDE 50 MCG: 10 INJECTION INTRAVENOUS at 10:01

## 2024-01-25 RX ADMIN — NYSTATIN 500000 UNITS: 100000 SUSPENSION ORAL at 04:01

## 2024-01-25 RX ADMIN — DOCUSATE SODIUM 100 MG: 100 CAPSULE, LIQUID FILLED ORAL at 09:01

## 2024-01-25 RX ADMIN — METHOCARBAMOL 500 MG: 500 TABLET ORAL at 02:01

## 2024-01-25 RX ADMIN — DOCUSATE SODIUM 100 MG: 100 CAPSULE, LIQUID FILLED ORAL at 08:01

## 2024-01-25 RX ADMIN — METHOCARBAMOL 500 MG: 500 TABLET ORAL at 08:01

## 2024-01-25 RX ADMIN — SUGAMMADEX 200 MG: 100 INJECTION, SOLUTION INTRAVENOUS at 12:01

## 2024-01-25 RX ADMIN — PROPOFOL 100 MG: 10 INJECTION, EMULSION INTRAVENOUS at 10:01

## 2024-01-25 RX ADMIN — ACETAMINOPHEN 1000 MG: 500 TABLET ORAL at 09:01

## 2024-01-25 RX ADMIN — PREGABALIN 200 MG: 100 CAPSULE ORAL at 09:01

## 2024-01-25 RX ADMIN — HYDROMORPHONE HYDROCHLORIDE 1 MG: 1 INJECTION, SOLUTION INTRAMUSCULAR; INTRAVENOUS; SUBCUTANEOUS at 09:01

## 2024-01-25 RX ADMIN — VANCOMYCIN HYDROCHLORIDE 1250 MG: 1.25 INJECTION, POWDER, LYOPHILIZED, FOR SOLUTION INTRAVENOUS at 04:01

## 2024-01-25 RX ADMIN — AMIODARONE HYDROCHLORIDE 200 MG: 200 TABLET ORAL at 09:01

## 2024-01-25 RX ADMIN — HYDROMORPHONE HYDROCHLORIDE 1 MG: 1 INJECTION, SOLUTION INTRAMUSCULAR; INTRAVENOUS; SUBCUTANEOUS at 06:01

## 2024-01-25 RX ADMIN — Medication 10 ML: at 12:01

## 2024-01-25 RX ADMIN — SODIUM CHLORIDE: 0.9 INJECTION, SOLUTION INTRAVENOUS at 10:01

## 2024-01-25 RX ADMIN — ROCURONIUM BROMIDE 40 MG: 10 INJECTION, SOLUTION INTRAVENOUS at 10:01

## 2024-01-25 RX ADMIN — PREGABALIN 200 MG: 100 CAPSULE ORAL at 02:01

## 2024-01-25 RX ADMIN — VASOPRESSIN 1 UNITS: 20 INJECTION INTRAVENOUS at 10:01

## 2024-01-25 RX ADMIN — HYDROMORPHONE HYDROCHLORIDE 0.2 MG: 1 INJECTION, SOLUTION INTRAMUSCULAR; INTRAVENOUS; SUBCUTANEOUS at 12:01

## 2024-01-25 RX ADMIN — Medication 10 ML: at 04:01

## 2024-01-25 RX ADMIN — CEFAZOLIN 2 G: 330 INJECTION, POWDER, FOR SOLUTION INTRAMUSCULAR; INTRAVENOUS at 10:01

## 2024-01-25 RX ADMIN — PREGABALIN 200 MG: 100 CAPSULE ORAL at 08:01

## 2024-01-25 RX ADMIN — NYSTATIN 500000 UNITS: 100000 SUSPENSION ORAL at 08:01

## 2024-01-25 RX ADMIN — ESCITALOPRAM OXALATE 20 MG: 5 TABLET, FILM COATED ORAL at 09:01

## 2024-01-25 RX ADMIN — METOPROLOL TARTRATE 50 MG: 50 TABLET, FILM COATED ORAL at 09:01

## 2024-01-25 RX ADMIN — METOPROLOL TARTRATE 50 MG: 50 TABLET, FILM COATED ORAL at 08:01

## 2024-01-25 RX ADMIN — APIXABAN 5 MG: 5 TABLET, FILM COATED ORAL at 09:01

## 2024-01-25 NOTE — PROGRESS NOTES
"[facility] - [facility] ([address] [place] [address] Medicine  Progress Note    Patient Name: Froilan Ray  MRN: [id number]  Patient Class: IP- Inpatient   Admission Date: 1/17/2024  Length of Stay: 8 days  Attending Physician: Rick Price MD  Primary Care Provider: Alphonse Boothe III, MD        Subjective:     Principal Problem:Sepsis due to methicillin resistant Staphylococcus aureus (MRSA)        HPI:  Froilan Ray is a 78 year old white woman with hypertension, chronic systolic congestive heart failure, atrial fibrillation (anticoagulated on apixaban), irritable bowel syndrome, diverticulosis, seizure disorder, iron deficiency anemia, cervical and lumbar generative disc disease, history of L2-S1 decompression ion 2/15/2006, history of cervical decompression, history of spinal cord stimulator implant on 9/18/2013 with subsequent removal, arthritis, peripheral neuropathy, history of epigastric hernia repair on 12/7/2023, history of hysterectomy on 10/2/2017, history of left intertrochanteric femur fracture status post open reduction internal fixation with intramedullary device on 12/11/2018, chronic pain on opioids. She lives in [place], [place]. She is . Her primary care physician is Dr. Alphonse Boothe III.               She presented to [facility] on 1/16/2024 for symptoms of septic joint that had been present for "a long time". She had been "unable to do anything", basically lying in bed most of the day due to inability to bear weight on her left leg due to pain. She has chronic pain, for which she takes "2 Percocets every 6 hours", which was not relieving her pain for the past 2 to 3 weeks. She also had dry mouth. She was tachycardic and labs showed leukocytosis. CT showed fluid around her left hip previous surgical cite. She was in atrial fibrillation with rapid ventricular response and was converted to sinus rhythm with diltiazem and amiodarone transfusions. She was given " antibiotics. She was transferred to [facility] on the night of 1/17/2024 for Orthopedic Surgery evaluation and admitted to Hospital Medicine Team S.     Overview/Hospital Course:  She underwent joint aspiration finding purulent fluid with culture growing Staphylococcus. Blood culture grew methicillin-resistant Staphylococcus aureus. She was put on piperacillin-tazobactam and vancomycin initially. Piperacillin-tazobactam was stopped. Orthopedic Surgery performed hip washout and debridement with proximal femoral resection with placement of antibiotic cement spacer, removal of cephalomedullary nail, incision and drainage with irrigation of deep abscess, excisional debridement of fascia and muscle. Drain and wound vac were placed. She developed painful vesicular lesions on her tongue after starting trimethoprim-sulfamethoxazole. She was given viscous lidocaine and empiric valganciclovir. HSV PCR was indeterminate. HSV DNA was detected but could not differentiate between 1 and 2. Infectious Disease was consulted and recommended switching to daptomycin at discharge and giving 8 mg/kg daily until 2/29/2024. Repeat blood culture had no growth. PICC was placed. Lumbar spine noted no infection. Physical and Occupational Therapy were consulted. She declined skilled nursing facility placement.     Interval History: Repeat I&D today.    Review of Systems   Constitutional:  Negative for chills and fever.   Musculoskeletal:  Positive for arthralgias and gait problem.     Objective:     Vital Signs (Most Recent):  Temp: 98.9 °F (37.2 °C) (01/25/24 0842)  Pulse: 81 (01/25/24 0945)  Resp: 20 (01/25/24 0945)  BP: 135/68 (01/25/24 0945)  SpO2: 98 % (01/25/24 0945) Vital Signs (24h Range):  Temp:  [97.7 °F (36.5 °C)-100.7 °F (38.2 °C)] 98.9 °F (37.2 °C)  Pulse:  [27-95] 81  Resp:  [17-20] 20  SpO2:  [92 %-100 %] 98 %  BP: ()/(56-72) 135/68     Weight: 63.9 kg (140 lb 14 oz)  Body mass index is 22.74  kg/m².    Intake/Output Summary (Last 24 hours) at 1/25/2024 1049  Last data filed at 1/25/2024 1023  Gross per 24 hour   Intake 912.92 ml   Output 1330 ml   Net -417.08 ml         Physical Exam  Vitals and nursing note reviewed.   Constitutional:       General: She is not in acute distress.     Appearance: She is well-developed and normal weight. She is not diaphoretic.   Musculoskeletal:      Comments: Left hip dressed with drain   Neurological:      Mental Status: She is alert and oriented to person, place, and time. Mental status is at baseline.      Motor: No seizure activity.   Psychiatric:         Attention and Perception: Attention normal.         Mood and Affect: Affect normal.         Behavior: Behavior is not aggressive. Behavior is cooperative.             Significant Labs: All pertinent labs within the past 24 hours have been reviewed.    Significant Imaging: I have reviewed all pertinent imaging results/findings within the past 24 hours.  IR Aspiration Injection Large Joint W FL 1/18/24: Impression:  Percutaneous aspiration of left hip joint, yielding 10 mL of purulent fluid. No drainage catheter was left in place.   Percutaneous aspiration of left hip soft tissue collection, yielding 65 mL of purulent fluid. No drainage catheter was left in place.   Plan:   Send specimens to lab for analysis and culture   X-Ray Hip 2 or 3 views Left (with Pelvis when performed) 1/19/24: FINDINGS:   Three views left hip.   Since the previous exam, there has been placement of left femoral arthroplasty and antibiotic bead placement.  Left femoroacetabular joint is intact.  There is osteopenia.  There are degenerative changes of the spine.  Please see op note.   X-Ray Chest 1 View S/P PICC Line by Nursing 1/22/24: FINDINGS:   Right PICC is now present with tip near the expected junction of the brachiocephalic veins.  The heart size is normal.  Lungs are expanded and clear without acute consolidation.  Skeletal structures  show DJD at right shoulder.   X-Ray Abdomen AP 1 View 1/23/24: FINDINGS:   Single abdominal radiograph is submitted, the entirety of the upper abdomen to include the hemidiaphragms, as well as the peripheral aspect of the right abdomen not included on the field of view.  Postoperative left hip replacement is noted, with associated hardware, multiple small rounded densities are so shaded with the right hemipelvis and hip that may relate to postoperative change for which clinical correlation is needed.  There are overlying skin staples noted.  Monitoring lead overlies the right upper abdomen.  There is no additional radiographically detectable radiopaque metallic appearing foreign body.   Small focal calcifications are noted.  The osseous structures demonstrate chronic change.  There is mild to moderate air distention of the right colon and visualized transverse colon.   X-Ray Skull Ltd Less Than 4 Views 1/23/24: FINDINGS:   Positioning is less than optimal.  On the frontal view there is a small density projected over the mandible, this is likely external to the patient.  There is no additional radiographic evidence for radiographically detectable metallic radiopaque foreign body.  The visualized osseous structures appear intact, chronic changes are noted.  There is no evidence for acute fracture deformity.   MRI Lumbar Spine W WO Cont 1/23/24: FINDINGS:   Additional comment: For the purposes of this report, the L5-S1 level is considered axial image 34.  Confirmation of level numbering prior to any spine intervention would be recommended.   Postoperative/posttreatment findings: Status post laminectomy spanning L2-L5.  Susceptibility-related signal loss in the posterior paravertebral soft tissues at the operative levels may reflect micrometallic debris.  No large postoperative drainable fluid collection or abscess is appreciated.   Alignment: Scoliotic curvature of the spine, grossly similar to 01/16/2024 abdomen and  pelvis CT and 11/19/2023 lumbar spine CT.  As before, there is apex convex levocurvature centered at approximately L2.  Left lateral listhesis of L3 on L4 is again appreciated.   Additionally, there is similar focal kyphosis centered at the L3 level related to chronic compression deformity of L3.  Persistent retropulsion of the L3 vertebral body posterior endplate by approximately 3-4 mm into the spinal canal, similar to 11/19/2023.   Developmental spinal canal narrowing: None.   Discs: Degenerative disc disease, discussed further below.   Vertebrae: Chronic deformity of the L3 vertebra and scalloping of the L4 vertebra superior endplate, similar compared to most recent CT of 11/19/2023 and 01/16/2024.  Areas of ill-defined edema-like signal within the endplates spanning the L3-4 disc favored to represent active endplate degeneration and/or sequela of altered biomechanics.   Spinal cord: The distal cord has normal signal.The conus terminates at approximately L1.   Level-wise findings are as follows:   T12-L1: Shallow diffuse disc bulge and rightward eccentric facet arthropathy with ligamentum flavum thickening.  Mild central spinal canal stenosis.No significant foraminal narrowing.   L1-L2: Shallow diffuse disc bulge and mild facet arthropathy with ligamentum flavum thickening.  No significant central spinal canal stenosis.  Mild left and moderate right foraminal narrowing.   L2-L3: Minor posterior osteophyte formation shallow disc bulging.  No significant central spinal canal stenosis.  Minimal bilateral foraminal narrowing.   L3-L4: Mild posterior osteophyte formation, shallow disc bulge, and facet arthropathy with ligamentum flavum thickening.  Mild central spinal canal stenosis and moderate bilateral foraminal narrowing.   L4-L5: Minimal posterior osteophyte formation.  Degenerative facet arthropathy with ligamentum flavum thickening.  No significant central spinal canal stenosis.  No significant right foraminal  narrowing.  Mild-to-moderate left foraminal narrowing.   L5-S1: Shallow diffuse disc bulge, eccentric towards the left and facet arthropathy with ligamentum flavum thickening.  No significant central spinal canal stenosis.  Moderate left foraminal narrowing.  No significant right foraminal narrowing.   Imaged sacroiliac joints: Degenerative changes.   Imaged abdomen, pelvis, and retroperitoneum: No abdominal, pelvic, or retroperitoneal abnormalities are detected on limited imaging.   Impression:  1. Postoperative and degenerative findings in the lumbar spine, as discussed.   2. Chronic height loss of the L3 and L4 vertebra, similar configuration to prior CT imaging of 01/16/2024 and 11/19/2023.   3. No definite MR findings to suggest acute infectious or inflammatory process.   4. Additional details, as provided in the body of report.      Assessment/Plan:      * Sepsis due to methicillin resistant Staphylococcus aureus (MRSA)  Orthopedic Surgery following. S/p surgeries. Getting vancomycin. Infectious Disease recommended daptomycin at discharge. PICC placed.    Oral lesion  Giving empiric valacyclovir and viscous lidocaine. -    MRSA bacteremia  See primary problem.    Staphylococcal arthritis of left hip  See primary problem.    Painful orthopaedic hardware  See primary problem    (HFpEF) heart failure with preserved ejection fraction  Control hypertension. Monitor intake/output.    Depression  Continue home escitalopram.        Chronic atrial fibrillation with RVR  Continue home amiodarone, metoprolol, apixaban.      Iron deficiency anemia  Chronic, gets iron infusions.    Peripheral neuropathy  Continue home pregabalin.    Essential hypertension  She takes amlodipine-benazepril, metoprolol tartrate. Giving hospital formulary alternative of home antihypertensives. Monitor blood pressures.    Chronic pain with uncomplicated opioid dependence  She takes oxycodone-acetaminophen prn. Giving oxycodone prn with  hydromorphone for breakthrough pain in the setting of acute pain.        VTE Risk Mitigation (From admission, onward)           Ordered     apixaban tablet 5 mg  2 times daily         01/19/24 1446     Reason for No Pharmacological VTE Prophylaxis  Once        Question:  Reasons:  Answer:  Already adequately anticoagulated on oral Anticoagulants    01/17/24 2037     IP VTE HIGH RISK PATIENT  Once         01/17/24 2037     Place sequential compression device  Until discontinued         01/17/24 2037                    Discharge Planning   BAILEY: 1/26/2024     Code Status: Full Code   Is the patient medically ready for discharge?: No    Reason for patient still in hospital (select all that apply): Treatment and Consult recommendations  Discharge Plan A: Skilled Nursing Facility   Discharge Delays: None known at this time              Jerry Jensen MD  Department of Hospital Medicine   [facility] - Surgery ([facility])

## 2024-01-25 NOTE — CARE UPDATE
Patient in Pre-op 17 prior to scheduled surgery w/ Dr. Painting. Safety checklist is complete-vitals stable, consents on chart, right arm PICC flushed and patent. Telemetry box removed from patient and sent to PACU.     Pt's  contacted by phone and updated on plan of care. Was able to speak w/ pt as well.

## 2024-01-25 NOTE — ANESTHESIA PREPROCEDURE EVALUATION
01/25/2024  Froilan Ray is a 78 y.o., female.      Pre-op Assessment    I have reviewed the Patient Summary Reports.          Review of Systems  Anesthesia Hx:  No problems with previous Anesthesia                Social:  Non-Smoker       Hematology/Oncology:    Oncology Normal    -- Anemia:                                  EENT/Dental:  EENT/Dental Normal           Cardiovascular:     Hypertension    Dysrhythmias atrial fibrillation                                   Pulmonary:  Pulmonary Normal                       Renal/:  Renal/ Normal                 Hepatic/GI:   PUD,               Musculoskeletal:  Musculoskeletal Normal                Neurological:       Seizures                                Endocrine:  Endocrine Normal            Dermatological:  Skin Normal    Psych:    depression                Physical Exam  General: Alert and Oriented    Airway:  Mallampati: II / II  Mouth Opening: Normal  TM Distance: Normal  Tongue: Normal  Neck ROM: Normal ROM    Dental:  Intact    Chest/Lungs:  Clear to auscultation, Normal Respiratory Rate    Heart:  Rate: Normal  Rhythm: Regular Rhythm  Sounds: Normal        Anesthesia Plan  Type of Anesthesia, risks & benefits discussed:    Anesthesia Type: Gen ETT  Intra-op Monitoring Plan: Standard ASA Monitors  Post Op Pain Control Plan: multimodal analgesia  Airway Plan: Direct  Informed Consent: Informed consent signed with the Patient and all parties understand the risks and agree with anesthesia plan.  All questions answered.   ASA Score: 3    Ready For Surgery From Anesthesia Perspective.     .

## 2024-01-25 NOTE — OP NOTE
OPERATIVE NOTE    DATE OF PROCEDURE:  01/25/2024    PREOPERATIVE DIAGNOSIS:   Left hip deep abscess  Left hip septic arthritis    POSTOPERATIVE DIAGNOSIS:   Left hip deep abscess  Left hip septic arthritis    PROCEDURE:   Arthrotomy left hip for septic arthritis   Incision and drainage left hip deep abscess    SURGEON:   Juanito Painting MD    ASSISTANT:    Paul Jimenez MD    ANESTHESIA:   General    EBL:    100 mL    COMPLICATIONS:  None    IMPLANTS:   [place]  Osteoset, Gloria antibiotic beads + vancomycin + tobramycin  Drain x 1    SPECIMENS:   Culture x 3    INDICATIONS FOR PROCEDURE:  78-year-old female with prior left hip IT fracture status post short intramedullary nail 2018 at outside hospital subsequently developed AVN, collapse, left septic hip arthritis and large deep abscess left hip region.  Initially seen outside hospital and then transferred to our facility for further care 01/17/2024.  Underwent I&D left hip and thigh, removal of hardware, antibiotic hip spacer placement 01/19/2024    Was on antibiotics per Infectious Disease postoperatively, however develop fever, elevated white count.  Concern for ongoing abscess, but infection in the left hip given the large amount of purulence located here.    Discussed with the patient condition, and non operative versus operative management options.  Non operative management consist of continued antibiotics, observation, further medical workup.  Given the large amount of purulence initially observed at the 1st surgery, felt that there is a high likelihood of recurrent abscess/fluid collection leading to persistent infection.  Discussed operative intervention the form of repeat I and D of the left hip to decrease infectious burden and allow cure.  Discussed risks of operative intervention including ongoing persistent infection, draining sinus, dislocation of hip, periprosthetic fracture.    The risks, benefits, and alternatives to surgery were discussed with  the patient and/or family.    Specific risks discussed included, but were not limited to:  damage to nearby structures, including neurovascular structures leading to loss of function or loss of limb, bleeding, need for blood transfusion, pain, stiffness, scarring, numbness, tingling, weakness, compartment syndrome, malunion/nonunion, hardware failure, hardware prominence, infection, need for multiple staged procedures, prolonged antibiotics, iatrogenic fracture, heterotopic ossification, arthritis, a variety of medical complications including but not limited to heart attack, stroke, deep venous thrombosis, pulmonary embolism, prolonged hospitalization, prolonged intubation, and death.   Patient and/or family expressed an understanding and desires to proceed with surgery.   All questions were answered.  No guarantees were implied or stated.  Informed consent was obtained.      OPERATIVE PROCEDURE:  Patient met in the preoperative hold area and the correct site and side of surgery being the left lower extremity were marked and verified.  Patient brought back to the operative suite.  General anesthesia smoothly induced.  Patient transferred over to operative table.  Placed in lateral position. All bony prominences were appropriately padded.  Patient received 2 g Ancef for preoperative antibiotics.  The left lower extremity was then prepped and draped in normal sterile fashion.    Time-out was performed verifying the correct patient, site/side of surgery, surgical consent, radiographs as applicable, preop antibiotics, necessary equipment, anticipated blood loss, length of procedure, postoperative disposition.      We 1st turned our attention to incision and drainage of the hip abscess.  We reopened the prior surgical incision left hip.  Removed the prior sutures.  In the subcutaneous tissue there was some serosanguineous fluid, no significant purulence.  We opened up the fascial incision.  There was similar fluid here,  no gross purulence.  We divided the IT band and glute.  Broke up any loculations within the glute max muscle.  Some cultures were taken from here.      We then turned our attention to the left hip arthrotomy.  We worked posterior and superior to the short external rotators.  This repair was still intact.  We were able to visualize the hip joint.  Short external rotators were left intact.  The hip was not dislocated.  Once again serosanguineous fluid and no significant purulence.  We removed the prior calcium sulfate beads that were located deep.     Irrigated pulse lavage saline  Dilute Betadine soak x3 minutes  Irrigated saline  Irrigated peroxide  Irrigated saline.      We then manufactured custom calcium sulfate antibiotic beads with vancomycin and tobramycin.  They were allowed to harden inserted deep into the wound.  We placed a deep drain.    Hemostasis achieved as needed with electrocautery.  Fascia closed with 0 Vicryl.  Deep tissue closed with 2-0 Vicryl.  Subcutaneous tissue closed with 3-0 Vicryl.  Skin closed with staples.  Xeroform, gauze, Tegaderm dressing applied.    At the conclusion of procedure the patient had soft and compressible compartments, brisk cap refill, palpable DP/PT pulse in the operative extremity.    Prior to final closure all counts were confirmed to be correct.  Patient tolerated the procedure well without any complications, was awoken from anesthesia, transferred PACU for further recovery.    POSTOPERATIVE PLAN:  78F, prior L hip IMN 2018 w/ subsequent L hip AVN, L hip septic arthritis, L hip abscess  1.19.24 - I&D L hip, AMARA, L hip abx spacer  1.25.24 - I&D L hip    Abx per ID  Medical management per primary team  Foot flat TTWB LLE x 6 wks postop  Permanent posterior hip precautions LLE  Eliquis x 4 wks for DVT prophylaxis    X-rays at subsequent followups:  L hip    Follow-up postop 2 weeks, 6 weeks, 3 months, 6 months, 1 year    =====================  Juanito Painting  MD  Orthopaedic Surgery

## 2024-01-25 NOTE — ANESTHESIA PROCEDURE NOTES
Intubation    Date/Time: 1/25/2024 10:08 AM    Performed by: Tara Quesada CRNA  Authorized by: Warren Benedict MD    Intubation:     Induction:  Intravenous    Intubated:  Postinduction    Mask Ventilation:  Easy mask    Attempts:  1    Attempted By:  Student (VLADIMIR Inman)    Method of Intubation:  Video laryngoscopy    Blade:  Goff 3    Laryngeal View Grade: Grade I - full view of cords      Difficult Airway Encountered?: No      Complications:  None    Airway Device:  Oral endotracheal tube    Airway Device Size:  7.0    Style/Cuff Inflation:  Cuffed (inflated to minimal occlusive pressure)    Tube secured:  22    Secured at:  The lips    Placement Verified By:  Capnometry    Complicating Factors:  None    Findings Post-Intubation:  BS equal bilateral and atraumatic/condition of teeth unchanged

## 2024-01-25 NOTE — ASSESSMENT & PLAN NOTE
She takes amlodipine-benazepril, metoprolol tartrate. Giving hospital formulary alternative of home antihypertensives. Monitor blood pressures.

## 2024-01-25 NOTE — NURSING TRANSFER
Nursing Transfer Note      1/25/2024   1:22 PM    Nurse giving handoff:Kevin Crocker RN   Nurse receiving handoff:MTSU RN    Reason patient is being transferred: s/p anesthesia    [facility]    Transfer via bed    Transfer with cardiac monitoring, cont pulse ox, wound vac    Transported by transport staff    Telemetry: Box Number [id number], Rate 69, and Rhythm NSR  Order for Tele Monitor? Yes    Additional Lines: wound vac    4eyes on Skin: yes    Medicines sent: none    Any special needs or follow-up needed: none    Patient belongings transferred with patient: No    Chart send with patient: Yes    Notified: spouse, son    Patient reassessed at: 1/25/24 at 1315 (date, time)

## 2024-01-25 NOTE — SUBJECTIVE & OBJECTIVE
Interval History: Repeat I&D today.    Review of Systems   Constitutional:  Negative for chills and fever.   Musculoskeletal:  Positive for arthralgias and gait problem.     Objective:     Vital Signs (Most Recent):  Temp: 98.9 °F (37.2 °C) (01/25/24 0842)  Pulse: 81 (01/25/24 0945)  Resp: 20 (01/25/24 0945)  BP: 135/68 (01/25/24 0945)  SpO2: 98 % (01/25/24 0945) Vital Signs (24h Range):  Temp:  [97.7 °F (36.5 °C)-100.7 °F (38.2 °C)] 98.9 °F (37.2 °C)  Pulse:  [27-95] 81  Resp:  [17-20] 20  SpO2:  [92 %-100 %] 98 %  BP: ()/(56-72) 135/68     Weight: 63.9 kg (140 lb 14 oz)  Body mass index is 22.74 kg/m².    Intake/Output Summary (Last 24 hours) at 1/25/2024 1049  Last data filed at 1/25/2024 1023  Gross per 24 hour   Intake 912.92 ml   Output 1330 ml   Net -417.08 ml         Physical Exam  Vitals and nursing note reviewed.   Constitutional:       General: She is not in acute distress.     Appearance: She is well-developed and normal weight. She is not diaphoretic.   Musculoskeletal:      Comments: Left hip dressed with drain   Neurological:      Mental Status: She is alert and oriented to person, place, and time. Mental status is at baseline.      Motor: No seizure activity.   Psychiatric:         Attention and Perception: Attention normal.         Mood and Affect: Affect normal.         Behavior: Behavior is not aggressive. Behavior is cooperative.             Significant Labs: All pertinent labs within the past 24 hours have been reviewed.    Significant Imaging: I have reviewed all pertinent imaging results/findings within the past 24 hours.  IR Aspiration Injection Large Joint W FL 1/18/24: Impression:  Percutaneous aspiration of left hip joint, yielding 10 mL of purulent fluid. No drainage catheter was left in place.   Percutaneous aspiration of left hip soft tissue collection, yielding 65 mL of purulent fluid. No drainage catheter was left in place.   Plan:   Send specimens to lab for analysis and  culture   X-Ray Hip 2 or 3 views Left (with Pelvis when performed) 1/19/24: FINDINGS:   Three views left hip.   Since the previous exam, there has been placement of left femoral arthroplasty and antibiotic bead placement.  Left femoroacetabular joint is intact.  There is osteopenia.  There are degenerative changes of the spine.  Please see op note.   X-Ray Chest 1 View S/P PICC Line by Nursing 1/22/24: FINDINGS:   Right PICC is now present with tip near the expected junction of the brachiocephalic veins.  The heart size is normal.  Lungs are expanded and clear without acute consolidation.  Skeletal structures show DJD at right shoulder.   X-Ray Abdomen AP 1 View 1/23/24: FINDINGS:   Single abdominal radiograph is submitted, the entirety of the upper abdomen to include the hemidiaphragms, as well as the peripheral aspect of the right abdomen not included on the field of view.  Postoperative left hip replacement is noted, with associated hardware, multiple small rounded densities are so shaded with the right hemipelvis and hip that may relate to postoperative change for which clinical correlation is needed.  There are overlying skin staples noted.  Monitoring lead overlies the right upper abdomen.  There is no additional radiographically detectable radiopaque metallic appearing foreign body.   Small focal calcifications are noted.  The osseous structures demonstrate chronic change.  There is mild to moderate air distention of the right colon and visualized transverse colon.   X-Ray Skull Ltd Less Than 4 Views 1/23/24: FINDINGS:   Positioning is less than optimal.  On the frontal view there is a small density projected over the mandible, this is likely external to the patient.  There is no additional radiographic evidence for radiographically detectable metallic radiopaque foreign body.  The visualized osseous structures appear intact, chronic changes are noted.  There is no evidence for acute fracture deformity.   MRI  Lumbar Spine W WO Cont 1/23/24: FINDINGS:   Additional comment: For the purposes of this report, the L5-S1 level is considered axial image 34.  Confirmation of level numbering prior to any spine intervention would be recommended.   Postoperative/posttreatment findings: Status post laminectomy spanning L2-L5.  Susceptibility-related signal loss in the posterior paravertebral soft tissues at the operative levels may reflect micrometallic debris.  No large postoperative drainable fluid collection or abscess is appreciated.   Alignment: Scoliotic curvature of the spine, grossly similar to 01/16/2024 abdomen and pelvis CT and 11/19/2023 lumbar spine CT.  As before, there is apex convex levocurvature centered at approximately L2.  Left lateral listhesis of L3 on L4 is again appreciated.   Additionally, there is similar focal kyphosis centered at the L3 level related to chronic compression deformity of L3.  Persistent retropulsion of the L3 vertebral body posterior endplate by approximately 3-4 mm into the spinal canal, similar to 11/19/2023.   Developmental spinal canal narrowing: None.   Discs: Degenerative disc disease, discussed further below.   Vertebrae: Chronic deformity of the L3 vertebra and scalloping of the L4 vertebra superior endplate, similar compared to most recent CT of 11/19/2023 and 01/16/2024.  Areas of ill-defined edema-like signal within the endplates spanning the L3-4 disc favored to represent active endplate degeneration and/or sequela of altered biomechanics.   Spinal cord: The distal cord has normal signal.The conus terminates at approximately L1.   Level-wise findings are as follows:   T12-L1: Shallow diffuse disc bulge and rightward eccentric facet arthropathy with ligamentum flavum thickening.  Mild central spinal canal stenosis.No significant foraminal narrowing.   L1-L2: Shallow diffuse disc bulge and mild facet arthropathy with ligamentum flavum thickening.  No significant central spinal  canal stenosis.  Mild left and moderate right foraminal narrowing.   L2-L3: Minor posterior osteophyte formation shallow disc bulging.  No significant central spinal canal stenosis.  Minimal bilateral foraminal narrowing.   L3-L4: Mild posterior osteophyte formation, shallow disc bulge, and facet arthropathy with ligamentum flavum thickening.  Mild central spinal canal stenosis and moderate bilateral foraminal narrowing.   L4-L5: Minimal posterior osteophyte formation.  Degenerative facet arthropathy with ligamentum flavum thickening.  No significant central spinal canal stenosis.  No significant right foraminal narrowing.  Mild-to-moderate left foraminal narrowing.   L5-S1: Shallow diffuse disc bulge, eccentric towards the left and facet arthropathy with ligamentum flavum thickening.  No significant central spinal canal stenosis.  Moderate left foraminal narrowing.  No significant right foraminal narrowing.   Imaged sacroiliac joints: Degenerative changes.   Imaged abdomen, pelvis, and retroperitoneum: No abdominal, pelvic, or retroperitoneal abnormalities are detected on limited imaging.   Impression:  1. Postoperative and degenerative findings in the lumbar spine, as discussed.   2. Chronic height loss of the L3 and L4 vertebra, similar configuration to prior CT imaging of 01/16/2024 and 11/19/2023.   3. No definite MR findings to suggest acute infectious or inflammatory process.   4. Additional details, as provided in the body of report.

## 2024-01-25 NOTE — NURSING
Pt had a bladder scan of 379 notified md he requesting that nurse monitor pt and wait to see if she voids on her own than recheck if she does not and would prolly need a in and out catheter.

## 2024-01-25 NOTE — ASSESSMENT & PLAN NOTE
She takes oxycodone-acetaminophen prn. Giving oxycodone prn with hydromorphone for breakthrough pain in the setting of acute pain.

## 2024-01-25 NOTE — ASSESSMENT & PLAN NOTE
Froilan Ray is a 78 y.o. female w/PMH of HTN, HLD, CHF, chronic pain on opioids, peripheral neuropathy, L-intertroch fx s/p TFNA (12/11/2018, Dr. Eulalio De La Cruz), who presents as transfer from OSH with large left hip fluid collection and XR showing femoral head collapse with cut-through of the TFNA helical blade. Blood cx + for MRSA, on vanc/ceftaroline, ID consulted. Pt underwent IR aspiration of L hip on 1/18 with 72cc of juan pus aspirated, gram stain + for gram positive cocci in clusters.    » s/p nail removal and abx spacer 1/19    Patient with low grade fevers yesterday and WBC elevated to 14. Increasing left hip pain. Plan for repeat I&D of L hip today. Pt NPO since MN.     Pain control: MM  PT/OT:  TTWB LLE  I changed her Tylenol from p.r.n. to 1000 mg t.i.d. to add to her multimodal pain regimen.  Patient has a history of chronic opioid use.  DVT PPx: eliquis 5 bid for a fib, SCDs at all times when not ambulating  Abx:  vanc, likely 6 weeks vanc with chronic suppression thereafter.   Cx: hip aspiration cx growing MRSA   Repeat blood cultures NGTD  PICC team consulted     » Dispo: pending final ID recs, drain removal

## 2024-01-25 NOTE — TRANSFER OF CARE
"Anesthesia Transfer of Care Note    Patient: Froilan Ray    Procedure(s) Performed: Procedure(s) (LRB):  Irrigation and debridement left hip, (Left)    Patient location: PACU    Anesthesia Type: general    Transport from OR: Transported from OR on 6-10 L/min O2 by face mask with adequate spontaneous ventilation    Post pain: adequate analgesia    Post assessment: no apparent anesthetic complications and tolerated procedure well    Post vital signs: stable    Level of consciousness: responds to stimulation    Nausea/Vomiting: no nausea/vomiting    Complications: none    Transfer of care protocol was followed      Last vitals: Visit Vitals  /68 (BP Location: Left arm, Patient Position: Lying)   Pulse 81   Temp 37.2 °C (98.9 °F) (Oral)   Resp 20   Ht 5' 6" (1.676 m)   Wt 63.9 kg (140 lb 14 oz)   SpO2 98%   Breastfeeding No   BMI 22.74 kg/m²     "

## 2024-01-25 NOTE — PLAN OF CARE
Problem: Adult Inpatient Plan of Care  Goal: Plan of Care Review  Outcome: Ongoing, Progressing  Goal: Patient-Specific Goal (Individualized)  Outcome: Ongoing, Progressing  Goal: Absence of Hospital-Acquired Illness or Injury  Outcome: Ongoing, Progressing  Goal: Optimal Comfort and Wellbeing  Outcome: Ongoing, Progressing  Goal: Readiness for Transition of Care  Outcome: Ongoing, Progressing     Problem: Adjustment to Illness (Sepsis/Septic Shock)  Goal: Optimal Coping  Outcome: Ongoing, Progressing     Problem: Bleeding (Sepsis/Septic Shock)  Goal: Absence of Bleeding  Outcome: Ongoing, Progressing     Problem: Glycemic Control Impaired (Sepsis/Septic Shock)  Goal: Blood Glucose Level Within Desired Range  Outcome: Ongoing, Progressing     Problem: Infection Progression (Sepsis/Septic Shock)  Goal: Absence of Infection Signs and Symptoms  Outcome: Ongoing, Progressing     Problem: Nutrition Impaired (Sepsis/Septic Shock)  Goal: Optimal Nutrition Intake  Outcome: Ongoing, Progressing     Problem: Fluid and Electrolyte Imbalance (Acute Kidney Injury/Impairment)  Goal: Fluid and Electrolyte Balance  Outcome: Ongoing, Progressing     Problem: Oral Intake Inadequate (Acute Kidney Injury/Impairment)  Goal: Optimal Nutrition Intake  Outcome: Ongoing, Progressing     Problem: Renal Function Impairment (Acute Kidney Injury/Impairment)  Goal: Effective Renal Function  Outcome: Ongoing, Progressing     Problem: Skin Injury Risk Increased  Goal: Skin Health and Integrity  Outcome: Ongoing, Progressing     Problem: Fall Injury Risk  Goal: Absence of Fall and Fall-Related Injury  Outcome: Ongoing, Progressing     Problem: Infection  Goal: Absence of Infection Signs and Symptoms  Outcome: Ongoing, Progressing  Pt is currently laying in the bed resting quietly resp even unlabored no distress noted at this time pt also had a procedure done to wash out the infection in the left hip she has a wound vac in place 125 mmgh  tolerating well she continues to have pain pills prn for pain control.

## 2024-01-25 NOTE — PT/OT/SLP PROGRESS
Physical Therapy      Patient Name:  Froilan Ray   MRN:  [id number]    Patient not seen today secondary to Off the floor for procedure/surgery (Pt TARA away for I&D of L hip). PT Will follow-up on next scheduled visit.

## 2024-01-25 NOTE — SUBJECTIVE & OBJECTIVE
Principal Problem:Sepsis due to methicillin resistant Staphylococcus aureus (MRSA)    Principal Orthopedic Problem:  Left hip septic arthritis and gluteal/thigh abscess with remote history of cephalomedullary nail fixation left proximal femur.    Interval History:  Postoperative day 6. Pt with increasing pain today. Low grade fevers yesterday and now with WBC elevated to 14. Plan for repeat I&D of L hip today. Hgb 7.1. Will give 1 unit in OR.     Review of patient's allergies indicates:  No Known Allergies    Current Facility-Administered Medications   Medication    (Magic mouthwash) 1:1:1 diphenhydrAMINE(Benadryl) 12.5mg/5ml liq, aluminum & magnesium hydroxide-simethicone (Maalox), LIDOcaine viscous 2%    0.9%  NaCl infusion (for blood administration)    0.9%  NaCl infusion (for blood administration)    0.9%  NaCl infusion (for blood administration)    acetaminophen tablet 1,000 mg    aluminum-magnesium hydroxide-simethicone 200-200-20 mg/5 mL suspension 30 mL    amiodarone tablet 200 mg    amLODIPine tablet 5 mg    apixaban tablet 5 mg    docusate sodium capsule 100 mg    EScitalopram oxalate tablet 20 mg    guaiFENesin 100 mg/5 ml syrup 200 mg    oxyCODONE immediate release tablet 5 mg    And    oxyCODONE immediate release tablet Tab 10 mg    And    HYDROmorphone injection 1 mg    LIDOcaine viscous HCl 2% oral solution 5 mL    melatonin tablet 6 mg    methocarbamoL tablet 500 mg    metoprolol tartrate (LOPRESSOR) tablet 50 mg    naloxone 0.4 mg/mL injection 0.02 mg    nystatin 100,000 unit/mL suspension 500,000 Units    ondansetron disintegrating tablet 8 mg    polyethylene glycol packet 17 g    pregabalin capsule 200 mg    simethicone chewable tablet 80 mg    sodium chloride 0.9% flush 1-10 mL    sodium chloride 0.9% flush 10 mL    And    sodium chloride 0.9% flush 10 mL    sodium chloride 0.9% flush 10 mL    valACYclovir tablet 1,000 mg    vancomycin - pharmacy to dose    vancomycin 1,250 mg in dextrose 5 %  "(D5W) 250 mL IVPB (Vial-Mate)     Objective:     Vital Signs (Most Recent):  Temp: 98.9 °F (37.2 °C) (01/25/24 0842)  Pulse: 76 (01/25/24 0842)  Resp: 17 (01/24/24 1953)  BP: 117/72 (01/25/24 0842)  SpO2: 97 % (01/25/24 0842) Vital Signs (24h Range):  Temp:  [97.7 °F (36.5 °C)-100.7 °F (38.2 °C)] 98.9 °F (37.2 °C)  Pulse:  [27-95] 76  Resp:  [17-20] 17  SpO2:  [92 %-100 %] 97 %  BP: ()/(56-72) 117/72     Weight: 63.9 kg (140 lb 14 oz)  Height: 5' 6" (167.6 cm)  Body mass index is 22.74 kg/m².      Intake/Output Summary (Last 24 hours) at 1/25/2024 0908  Last data filed at 1/24/2024 1836  Gross per 24 hour   Intake 562.92 ml   Output 1330 ml   Net -767.08 ml          Ortho/SPM Exam    Dressing is clean dry and intact.  Drain is intact.  Drain output ss in last 24 hours.  Serosanguineous without juan purulence.     Significant Labs: CBC:   Recent Labs   Lab 01/24/24  0432 01/25/24  0601   WBC 10.99 14.60*   HGB 7.3* 7.1*   HCT 23.5* 22.3*   * 475*       CMP:   Recent Labs   Lab 01/24/24  0432 01/25/24  0601   * 131*   K 4.1 4.8    101   CO2 23 23   * 130*   BUN 15 24*   CREATININE 0.8 1.2   CALCIUM 9.2 7.7*   PROT 5.5* 5.5*   ALBUMIN 1.4* 1.3*   BILITOT 0.4 0.4   ALKPHOS 123 145*   AST 57* 46*   ALT 39 34   ANIONGAP 6* 7*       CRP:   Recent Labs   Lab 01/23/24  1053   .9*         "

## 2024-01-25 NOTE — PROGRESS NOTES
[facility] - Telemetry Stepdown  Orthopedics  Progress Note    Patient Name: Froilan Ray  MRN: [id number]  Admission Date: 1/17/2024  Hospital Length of Stay: 8 days  Attending Provider: Jerry Jensen MD  Primary Care Provider: Alphonse Boothe III, MD  Follow-up For: Procedure(s) (LRB):  ARTHROPLASTY, HIP, GIRDLESTONE, POSTERIOR APPROACH (Left)  IRRIGATION AND DEBRIDEMENT, LOWER EXTREMITY (Left)    Post-Operative Day: 6 Days Post-Op  Subjective:     Principal Problem:Sepsis due to methicillin resistant Staphylococcus aureus (MRSA)    Principal Orthopedic Problem:  Left hip septic arthritis and gluteal/thigh abscess with remote history of cephalomedullary nail fixation left proximal femur.    Interval History:  Postoperative day 6. Pt with increasing pain today. Low grade fevers yesterday and now with WBC elevated to 14. Plan for repeat I&D of L hip today. Hgb 7.1. Will give 1 unit in OR.     Review of patient's allergies indicates:  No Known Allergies    Current Facility-Administered Medications   Medication    (Magic mouthwash) 1:1:1 diphenhydrAMINE(Benadryl) 12.5mg/5ml liq, aluminum & magnesium hydroxide-simethicone (Maalox), LIDOcaine viscous 2%    0.9%  NaCl infusion (for blood administration)    0.9%  NaCl infusion (for blood administration)    0.9%  NaCl infusion (for blood administration)    acetaminophen tablet 1,000 mg    aluminum-magnesium hydroxide-simethicone 200-200-20 mg/5 mL suspension 30 mL    amiodarone tablet 200 mg    amLODIPine tablet 5 mg    apixaban tablet 5 mg    docusate sodium capsule 100 mg    EScitalopram oxalate tablet 20 mg    guaiFENesin 100 mg/5 ml syrup 200 mg    oxyCODONE immediate release tablet 5 mg    And    oxyCODONE immediate release tablet Tab 10 mg    And    HYDROmorphone injection 1 mg    LIDOcaine viscous HCl 2% oral solution 5 mL    melatonin tablet 6 mg    methocarbamoL tablet 500 mg    metoprolol tartrate (LOPRESSOR) tablet 50 mg    naloxone 0.4 mg/mL injection  "0.02 mg    nystatin 100,000 unit/mL suspension 500,000 Units    ondansetron disintegrating tablet 8 mg    polyethylene glycol packet 17 g    pregabalin capsule 200 mg    simethicone chewable tablet 80 mg    sodium chloride 0.9% flush 1-10 mL    sodium chloride 0.9% flush 10 mL    And    sodium chloride 0.9% flush 10 mL    sodium chloride 0.9% flush 10 mL    valACYclovir tablet 1,000 mg    vancomycin - pharmacy to dose    vancomycin 1,250 mg in dextrose 5 % (D5W) 250 mL IVPB (Vial-Mate)     Objective:     Vital Signs (Most Recent):  Temp: 98.9 °F (37.2 °C) (01/25/24 0842)  Pulse: 76 (01/25/24 0842)  Resp: 17 (01/24/24 1953)  BP: 117/72 (01/25/24 0842)  SpO2: 97 % (01/25/24 0842) Vital Signs (24h Range):  Temp:  [97.7 °F (36.5 °C)-100.7 °F (38.2 °C)] 98.9 °F (37.2 °C)  Pulse:  [27-95] 76  Resp:  [17-20] 17  SpO2:  [92 %-100 %] 97 %  BP: ()/(56-72) 117/72     Weight: 63.9 kg (140 lb 14 oz)  Height: 5' 6" (167.6 cm)  Body mass index is 22.74 kg/m².      Intake/Output Summary (Last 24 hours) at 1/25/2024 0908  Last data filed at 1/24/2024 1836  Gross per 24 hour   Intake 562.92 ml   Output 1330 ml   Net -767.08 ml         Ortho/SPM Exam    Dressing is clean dry and intact.  Drain is intact.  Drain output ss in last 24 hours.  Serosanguineous without juan purulence.     Significant Labs: CBC:   Recent Labs   Lab 01/24/24  0432 01/25/24  0601   WBC 10.99 14.60*   HGB 7.3* 7.1*   HCT 23.5* 22.3*   * 475*       CMP:   Recent Labs   Lab 01/24/24  0432 01/25/24  0601   * 131*   K 4.1 4.8    101   CO2 23 23   * 130*   BUN 15 24*   CREATININE 0.8 1.2   CALCIUM 9.2 7.7*   PROT 5.5* 5.5*   ALBUMIN 1.4* 1.3*   BILITOT 0.4 0.4   ALKPHOS 123 145*   AST 57* 46*   ALT 39 34   ANIONGAP 6* 7*       CRP:   Recent Labs   Lab 01/23/24  1053   .9*         Assessment/Plan:     Staphylococcal arthritis of left hip  Froilan RIP Ray is a 78 y.o. female w/PMH of HTN, HLD, CHF, chronic pain on opioids, " peripheral neuropathy, L-intertroch fx s/p TFNA (12/11/2018, Dr. Eulalio De La Cruz), who presents as transfer from OSH with large left hip fluid collection and XR showing femoral head collapse with cut-through of the TFNA helical blade. Blood cx + for MRSA, on vanc/ceftaroline, ID consulted. Pt underwent IR aspiration of L hip on 1/18 with 72cc of juan pus aspirated, gram stain + for gram positive cocci in clusters.    » s/p nail removal and abx spacer 1/19    Patient with low grade fevers yesterday and WBC elevated to 14. Increasing left hip pain. Plan for repeat I&D of L hip today. Pt NPO since MN.     Pain control: MM  PT/OT:  TTWB LLE  I changed her Tylenol from p.r.n. to 1000 mg t.i.d. to add to her multimodal pain regimen.  Patient has a history of chronic opioid use.  DVT PPx: eliquis 5 bid for a fib, SCDs at all times when not ambulating  Abx:  vanc, likely 6 weeks vanc with chronic suppression thereafter.   Cx: hip aspiration cx growing MRSA   Repeat blood cultures NGTD  PICC team consulted     » Dispo: pending final ID recs, drain removal        Painful orthopaedic hardware  .          Paul Jimenez MD  Orthopedics  [facility] - Telemetry Stepdown

## 2024-01-25 NOTE — NURSING
Pt left the unit today at about 940 to go down to second floor for surgery of the left hip to get a washout the MD placed stat order in for 1 unit of packed red blood cells nurse release it and than was called back once blood arrived to the unit md stated  have pt transported with the blood  since it was not spiked yet and the nurse in surgery will administer the blood md came to pt room within 5 mins  to get pt nurse help transport pt off the unit she left with wound vac still in place at 125 mmgh pt is alert the pt had c/o pain before leaving she was given prn dilaudid per md pt also was only given metoprolol, norvasc, amiodarone and tylenol all other medications were held per surgery MD request pt  called the unit and is aware of what is going on with the pt.

## 2024-01-26 LAB
ALBUMIN SERPL BCP-MCNC: 1.3 G/DL (ref 3.5–5.2)
ALP SERPL-CCNC: 168 U/L (ref 55–135)
ALT SERPL W/O P-5'-P-CCNC: 29 U/L (ref 10–44)
ANION GAP SERPL CALC-SCNC: 10 MMOL/L (ref 8–16)
AST SERPL-CCNC: 50 U/L (ref 10–40)
BACTERIA #/AREA URNS AUTO: ABNORMAL /HPF
BASOPHILS # BLD AUTO: 0.03 K/UL (ref 0–0.2)
BASOPHILS NFR BLD: 0.2 % (ref 0–1.9)
BILIRUB SERPL-MCNC: 0.4 MG/DL (ref 0.1–1)
BILIRUB UR QL STRIP: NEGATIVE
BUN SERPL-MCNC: 30 MG/DL (ref 8–23)
CALCIUM SERPL-MCNC: 8.2 MG/DL (ref 8.7–10.5)
CHLORIDE SERPL-SCNC: 102 MMOL/L (ref 95–110)
CLARITY UR REFRACT.AUTO: ABNORMAL
CO2 SERPL-SCNC: 19 MMOL/L (ref 23–29)
COLOR UR AUTO: YELLOW
CREAT SERPL-MCNC: 1 MG/DL (ref 0.5–1.4)
DIFFERENTIAL METHOD BLD: ABNORMAL
EOSINOPHIL # BLD AUTO: 0 K/UL (ref 0–0.5)
EOSINOPHIL NFR BLD: 0 % (ref 0–8)
ERYTHROCYTE [DISTWIDTH] IN BLOOD BY AUTOMATED COUNT: 18 % (ref 11.5–14.5)
EST. GFR  (NO RACE VARIABLE): 58.4 ML/MIN/1.73 M^2
GLUCOSE SERPL-MCNC: 94 MG/DL (ref 70–110)
GLUCOSE UR QL STRIP: NEGATIVE
HCT VFR BLD AUTO: 23.4 % (ref 37–48.5)
HGB BLD-MCNC: 7.9 G/DL (ref 12–16)
HGB UR QL STRIP: ABNORMAL
IMM GRANULOCYTES # BLD AUTO: 0.3 K/UL (ref 0–0.04)
IMM GRANULOCYTES NFR BLD AUTO: 1.9 % (ref 0–0.5)
KETONES UR QL STRIP: NEGATIVE
LEUKOCYTE ESTERASE UR QL STRIP: ABNORMAL
LYMPHOCYTES # BLD AUTO: 1.5 K/UL (ref 1–4.8)
LYMPHOCYTES NFR BLD: 9.9 % (ref 18–48)
MCH RBC QN AUTO: 27.8 PG (ref 27–31)
MCHC RBC AUTO-ENTMCNC: 33.8 G/DL (ref 32–36)
MCV RBC AUTO: 82 FL (ref 82–98)
MICROSCOPIC COMMENT: ABNORMAL
MONOCYTES # BLD AUTO: 0.9 K/UL (ref 0.3–1)
MONOCYTES NFR BLD: 5.6 % (ref 4–15)
NEUTROPHILS # BLD AUTO: 12.7 K/UL (ref 1.8–7.7)
NEUTROPHILS NFR BLD: 82.4 % (ref 38–73)
NITRITE UR QL STRIP: NEGATIVE
NRBC BLD-RTO: 0 /100 WBC
PH UR STRIP: 5 [PH] (ref 5–8)
PLATELET # BLD AUTO: 432 K/UL (ref 150–450)
PMV BLD AUTO: 10.2 FL (ref 9.2–12.9)
POTASSIUM SERPL-SCNC: 4.6 MMOL/L (ref 3.5–5.1)
PROT SERPL-MCNC: 5.5 G/DL (ref 6–8.4)
PROT UR QL STRIP: ABNORMAL
RBC # BLD AUTO: 2.84 M/UL (ref 4–5.4)
RBC #/AREA URNS AUTO: 1 /HPF (ref 0–4)
SODIUM SERPL-SCNC: 131 MMOL/L (ref 136–145)
SP GR UR STRIP: 1.02 (ref 1–1.03)
SQUAMOUS #/AREA URNS AUTO: 1 /HPF
URN SPEC COLLECT METH UR: ABNORMAL
VANCOMYCIN TROUGH SERPL-MCNC: 26.2 UG/ML (ref 10–22)
WBC # BLD AUTO: 15.46 K/UL (ref 3.9–12.7)
WBC #/AREA URNS AUTO: 5 /HPF (ref 0–5)

## 2024-01-26 PROCEDURE — 51798 US URINE CAPACITY MEASURE: CPT

## 2024-01-26 PROCEDURE — 97535 SELF CARE MNGMENT TRAINING: CPT

## 2024-01-26 PROCEDURE — 80053 COMPREHEN METABOLIC PANEL: CPT | Performed by: STUDENT IN AN ORGANIZED HEALTH CARE EDUCATION/TRAINING PROGRAM

## 2024-01-26 PROCEDURE — 81001 URINALYSIS AUTO W/SCOPE: CPT | Performed by: HOSPITALIST

## 2024-01-26 PROCEDURE — 63600175 PHARM REV CODE 636 W HCPCS: Performed by: STUDENT IN AN ORGANIZED HEALTH CARE EDUCATION/TRAINING PROGRAM

## 2024-01-26 PROCEDURE — 85025 COMPLETE CBC W/AUTO DIFF WBC: CPT | Performed by: STUDENT IN AN ORGANIZED HEALTH CARE EDUCATION/TRAINING PROGRAM

## 2024-01-26 PROCEDURE — 25000003 PHARM REV CODE 250: Performed by: STUDENT IN AN ORGANIZED HEALTH CARE EDUCATION/TRAINING PROGRAM

## 2024-01-26 PROCEDURE — 27000207 HC ISOLATION

## 2024-01-26 PROCEDURE — 36415 COLL VENOUS BLD VENIPUNCTURE: CPT | Performed by: STUDENT IN AN ORGANIZED HEALTH CARE EDUCATION/TRAINING PROGRAM

## 2024-01-26 PROCEDURE — A4216 STERILE WATER/SALINE, 10 ML: HCPCS | Performed by: STUDENT IN AN ORGANIZED HEALTH CARE EDUCATION/TRAINING PROGRAM

## 2024-01-26 PROCEDURE — 97530 THERAPEUTIC ACTIVITIES: CPT

## 2024-01-26 PROCEDURE — 25000003 PHARM REV CODE 250: Performed by: ORTHOPAEDIC SURGERY

## 2024-01-26 PROCEDURE — 20600001 HC STEP DOWN PRIVATE ROOM

## 2024-01-26 RX ADMIN — APIXABAN 5 MG: 5 TABLET, FILM COATED ORAL at 09:01

## 2024-01-26 RX ADMIN — PREGABALIN 200 MG: 100 CAPSULE ORAL at 09:01

## 2024-01-26 RX ADMIN — METOPROLOL TARTRATE 50 MG: 50 TABLET, FILM COATED ORAL at 09:01

## 2024-01-26 RX ADMIN — LIDOCAINE HYDROCHLORIDE 5 ML: 20 SOLUTION ORAL; TOPICAL at 11:01

## 2024-01-26 RX ADMIN — VALACYCLOVIR HYDROCHLORIDE 1000 MG: 500 TABLET, FILM COATED ORAL at 09:01

## 2024-01-26 RX ADMIN — OXYCODONE HYDROCHLORIDE 10 MG: 10 TABLET ORAL at 12:01

## 2024-01-26 RX ADMIN — PREGABALIN 200 MG: 100 CAPSULE ORAL at 04:01

## 2024-01-26 RX ADMIN — METHOCARBAMOL 500 MG: 500 TABLET ORAL at 04:01

## 2024-01-26 RX ADMIN — VANCOMYCIN HYDROCHLORIDE 1250 MG: 1.25 INJECTION, POWDER, LYOPHILIZED, FOR SOLUTION INTRAVENOUS at 06:01

## 2024-01-26 RX ADMIN — LIDOCAINE HYDROCHLORIDE 5 ML: 20 SOLUTION ORAL; TOPICAL at 10:01

## 2024-01-26 RX ADMIN — HYDROMORPHONE HYDROCHLORIDE 1 MG: 1 INJECTION, SOLUTION INTRAMUSCULAR; INTRAVENOUS; SUBCUTANEOUS at 05:01

## 2024-01-26 RX ADMIN — DOCUSATE SODIUM 100 MG: 100 CAPSULE, LIQUID FILLED ORAL at 09:01

## 2024-01-26 RX ADMIN — Medication 6 MG: at 11:01

## 2024-01-26 RX ADMIN — HYDROMORPHONE HYDROCHLORIDE 1 MG: 1 INJECTION, SOLUTION INTRAMUSCULAR; INTRAVENOUS; SUBCUTANEOUS at 08:01

## 2024-01-26 RX ADMIN — METHOCARBAMOL 500 MG: 500 TABLET ORAL at 09:01

## 2024-01-26 RX ADMIN — LIDOCAINE HYDROCHLORIDE 5 ML: 20 SOLUTION ORAL; TOPICAL at 05:01

## 2024-01-26 RX ADMIN — NYSTATIN 500000 UNITS: 100000 SUSPENSION ORAL at 09:01

## 2024-01-26 RX ADMIN — HYDROMORPHONE HYDROCHLORIDE 1 MG: 1 INJECTION, SOLUTION INTRAMUSCULAR; INTRAVENOUS; SUBCUTANEOUS at 11:01

## 2024-01-26 RX ADMIN — AMLODIPINE BESYLATE 5 MG: 5 TABLET ORAL at 09:01

## 2024-01-26 RX ADMIN — NYSTATIN 500000 UNITS: 100000 SUSPENSION ORAL at 04:01

## 2024-01-26 RX ADMIN — Medication 10 ML: at 11:01

## 2024-01-26 RX ADMIN — Medication 10 ML: at 05:01

## 2024-01-26 RX ADMIN — Medication 10 ML: at 12:01

## 2024-01-26 RX ADMIN — NYSTATIN 500000 UNITS: 100000 SUSPENSION ORAL at 12:01

## 2024-01-26 RX ADMIN — ACETAMINOPHEN 1000 MG: 500 TABLET ORAL at 09:01

## 2024-01-26 RX ADMIN — ESCITALOPRAM OXALATE 20 MG: 5 TABLET, FILM COATED ORAL at 09:01

## 2024-01-26 RX ADMIN — AMIODARONE HYDROCHLORIDE 200 MG: 200 TABLET ORAL at 09:01

## 2024-01-26 RX ADMIN — ACETAMINOPHEN 1000 MG: 500 TABLET ORAL at 04:01

## 2024-01-26 RX ADMIN — LIDOCAINE HYDROCHLORIDE 5 ML: 20 SOLUTION ORAL; TOPICAL at 02:01

## 2024-01-26 RX ADMIN — Medication 10 ML: at 06:01

## 2024-01-26 NOTE — PROGRESS NOTES
"[facility] - Telemetry [facility] Medicine  Progress Note    Patient Name: Froilan Ray  MRN: [id number]  Patient Class: IP- Inpatient   Admission Date: 1/17/2024  Length of Stay: 9 days  Attending Physician: Jerry Jensen MD  Primary Care Provider: Alphonse Boothe III, MD        Subjective:     Principal Problem:Sepsis due to methicillin resistant Staphylococcus aureus (MRSA)        HPI:  Froilan Ray is a 78 year old white woman with hypertension, chronic systolic congestive heart failure, atrial fibrillation (anticoagulated on apixaban), irritable bowel syndrome, diverticulosis, seizure disorder, iron deficiency anemia, cervical and lumbar generative disc disease, history of L2-S1 decompression ion 2/15/2006, history of cervical decompression, history of spinal cord stimulator implant on 9/18/2013 with subsequent removal, arthritis, peripheral neuropathy, history of epigastric hernia repair on 12/7/2023, history of hysterectomy on 10/2/2017, history of left intertrochanteric femur fracture status post open reduction internal fixation with intramedullary device on 12/11/2018, chronic pain on opioids. She lives in [place], [place]. She is . Her primary care physician is Dr. Alphonse Boothe III.               She presented to [facility] on 1/16/2024 for symptoms of septic joint that had been present for "a long time". She had been "unable to do anything", basically lying in bed most of the day due to inability to bear weight on her left leg due to pain. She has chronic pain, for which she takes "2 Percocets every 6 hours", which was not relieving her pain for the past 2 to 3 weeks. She also had dry mouth. She was tachycardic and labs showed leukocytosis. CT showed fluid around her left hip previous surgical cite. She was in atrial fibrillation with rapid ventricular response and was converted to sinus rhythm with diltiazem and amiodarone transfusions. She was given " antibiotics. She was transferred to [facility] on the night of 1/17/2024 for Orthopedic Surgery evaluation and admitted to Hospital Medicine Team S.     Overview/Hospital Course:  She underwent joint aspiration finding purulent fluid with culture growing Staphylococcus. Blood culture grew methicillin-resistant Staphylococcus aureus. She was put on piperacillin-tazobactam and vancomycin initially. Piperacillin-tazobactam was stopped. Orthopedic Surgery performed hip washout and debridement with proximal femoral resection with placement of antibiotic cement spacer, removal of cephalomedullary nail, incision and drainage with irrigation of deep abscess, excisional debridement of fascia and muscle. Drain and wound vac were placed. She developed painful vesicular lesions on her tongue after starting trimethoprim-sulfamethoxazole. She was given viscous lidocaine and empiric valganciclovir. HSV PCR was indeterminate. HSV DNA was detected but could not differentiate between 1 and 2. Infectious Disease was consulted and recommended switching to daptomycin at discharge and giving 8 mg/kg daily until 2/29/2024. Repeat blood culture had no growth. PICC was placed. Lumbar spine noted no infection. Physical and Occupational Therapy were consulted. She declined skilled nursing facility placement. Orthopedic Surgery performed another incision and debridement on 1/25/2024 due to worsening signs of infection, but no purulence was found and Orthopedic Surgery suspected another source of infection. She had no new symptoms and felt well enough to go home. Chest X-ray showed no pneumonia. Urinalysis showed no urine infection.     Interval History: No new symptoms. Just wants to know when she can go home.     Review of Systems   Constitutional:  Negative for chills and fever.   Musculoskeletal:  Positive for arthralgias and gait problem.     Objective:     Vital Signs (Most Recent):  Temp: 98.1 °F (36.7 °C)  (01/26/24 1110)  Pulse: (!) 56 (01/26/24 1125)  Resp: 15 (01/26/24 1208)  BP: (!) 106/58 (01/26/24 1110)  SpO2: 97 % (01/26/24 1110) Vital Signs (24h Range):  Temp:  [97.5 °F (36.4 °C)-98.5 °F (36.9 °C)] 98.1 °F (36.7 °C)  Pulse:  [56-86] 56  Resp:  [15-22] 15  SpO2:  [95 %-98 %] 97 %  BP: ()/(54-67) 106/58     Weight: 63.9 kg (140 lb 14 oz)  Body mass index is 22.74 kg/m².    Intake/Output Summary (Last 24 hours) at 1/26/2024 1340  Last data filed at 1/26/2024 1116  Gross per 24 hour   Intake 290 ml   Output 1120 ml   Net -830 ml           Physical Exam  Vitals and nursing note reviewed.   Constitutional:       General: She is not in acute distress.     Appearance: She is well-developed and normal weight. She is not diaphoretic.   Musculoskeletal:      Comments: Left hip dressed with drain   Neurological:      Mental Status: She is alert and oriented to person, place, and time. Mental status is at baseline.      Motor: No seizure activity.   Psychiatric:         Attention and Perception: Attention normal.         Mood and Affect: Affect normal.         Behavior: Behavior is not aggressive. Behavior is cooperative.             Significant Labs: All pertinent labs within the past 24 hours have been reviewed.    Significant Imaging: I have reviewed all pertinent imaging results/findings within the past 24 hours.  X-Ray Chest AP Portable 1/26/24: FINDINGS:   The tip of the right PICC line is retracted in its position compared to earlier exam, now superimposing the right medial clavicle and likely at the right SVC-caval junction.  Clinical correlation.  Reconfirmed normal heart size and within limits of normal pulmonary vasculature.  Stable elevated right hemidiaphragm.  No focal right lung infiltrate, right pleural fluid, or right pneumothorax.  Stable deformity right humeral head felt indicative of remote healed fracture.  Some stable bilateral right greater than left shoulder girdle arthritic changes and mild  thoracic dextrocurvature and some degenerative changes in the spine.  EKG leads superimposed chest and abdomen.     Assessment/Plan:      * Sepsis due to methicillin resistant Staphylococcus aureus (MRSA)  Orthopedic Surgery following. S/p surgeries. Getting vancomycin. Infectious Disease recommended daptomycin at discharge to be given until 2/29/2024. PICC placed. Orthopedic Surgery concerned about worsening leukocytosis. She reported feeling well. Chest X-ray showed no pneumonia and urinalysis showed no UTI. If leukocytosis improves, continue with current treatment plan and discharge home.    Oral lesion  Giving empiric valacyclovir and viscous lidocaine. -    MRSA bacteremia  See primary problem.    Staphylococcal arthritis of left hip  See primary problem.    Painful orthopaedic hardware  See primary problem    (HFpEF) heart failure with preserved ejection fraction  Control hypertension. Monitor intake/output.    Depression  Continue home escitalopram.        Chronic atrial fibrillation with RVR  Continue home amiodarone, metoprolol, apixaban.      Iron deficiency anemia  Chronic, gets iron infusions.    Peripheral neuropathy  Continue home pregabalin.    Essential hypertension  She takes amlodipine-benazepril, metoprolol tartrate. Giving hospital formulary alternative of home antihypertensives. Monitor blood pressures.    Chronic pain with uncomplicated opioid dependence  She takes oxycodone-acetaminophen prn. Giving oxycodone prn with hydromorphone for breakthrough pain in the setting of acute pain.        VTE Risk Mitigation (From admission, onward)           Ordered     apixaban tablet 5 mg  2 times daily         01/19/24 1446     Reason for No Pharmacological VTE Prophylaxis  Once        Question:  Reasons:  Answer:  Already adequately anticoagulated on oral Anticoagulants    01/17/24 2037     IP VTE HIGH RISK PATIENT  Once         01/17/24 2037     Place sequential compression device  Until discontinued          01/17/24 2037                    Discharge Planning   BAILEY: 1/27/2024     Code Status: Full Code   Is the patient medically ready for discharge?: No    Reason for patient still in hospital (select all that apply): Patient trending condition and Laboratory test  Discharge Plan A: Home Health   Discharge Delays: None known at this time              Jerry Jensen MD  Department of Hospital Medicine   [facility] - Telemetry Stepdown

## 2024-01-26 NOTE — PLAN OF CARE
01/26/24 1134   Discharge Reassessment   Assessment Type Discharge Planning Assessment   Discharge Plan A Home Health   Transition of Care Barriers None   Why the patient remains in the hospital Requires continued medical care   Post-Acute Status   Home Health Status Set-up Complete/Auth obtained   IV Infusion Status Set-up Complete/Auth obtained     Per MD, pt not medically ready for d/c, needs further infectious workup.  Ewa with [employer] notified.  Call [phone] for IV infusion if pt d/c during weekend.  Will continue to follow.    Ivon Guerra RN CM  Case Management  [id number]

## 2024-01-26 NOTE — ANESTHESIA POSTPROCEDURE EVALUATION
Anesthesia Post Evaluation    Patient: Froilan Ray    Procedure(s) Performed: Procedure(s) (LRB):  Irrigation and debridement left hip, (Left)    Final Anesthesia Type: general      Patient location during evaluation: PACU  Patient participation: Yes- Able to Participate  Level of consciousness: awake and alert  Post-procedure vital signs: reviewed and stable  Pain management: adequate  Airway patency: patent    PONV status: None or treated.  Anesthetic complications: no      Cardiovascular status: hemodynamically stable  Respiratory status: spontaneous ventilation  Hydration status: euvolemic  Follow-up not needed.          Vitals Value Taken Time   BP 97/54 01/25/24 1347   Temp 36.4 °C (97.5 °F) 01/25/24 1345   Pulse 68 01/25/24 1349   Resp 18 01/25/24 1349   SpO2 95 % 01/25/24 1349   Vitals shown include unvalidated device data.      Event Time   Out of Recovery 01/25/2024 13:00:00         Pain/Duarte Score: Pain Rating Prior to Med Admin: 4 (1/26/2024  9:03 AM)  Pain Rating Post Med Admin: 4 (1/26/2024 10:03 AM)  Duarte Score: 9 (1/25/2024  1:00 PM)

## 2024-01-26 NOTE — PT/OT/SLP PROGRESS
Occupational Therapy   Treatment    Name: Froilan Ray  MRN: [id number]  Admitting Diagnosis:  Sepsis due to methicillin resistant Staphylococcus aureus (MRSA)  1 Day Post-Op    Recommendations:     Discharge Recommendations: Moderate Intensity Therapy  Discharge Equipment Recommendations:  to be determined by next level of care  Barriers to discharge:   (increased level of assistance needed at this time)    Assessment:     Froilan Ray is a 78 y.o. female with a medical diagnosis of Sepsis due to methicillin resistant Staphylococcus aureus (MRSA). Performance deficits affecting function are weakness, impaired endurance, impaired self care skills, impaired functional mobility, gait instability, impaired balance, orthopedic precautions, decreased ROM, pain, decreased safety awareness, decreased lower extremity function, decreased upper extremity function, decreased coordination. Patient very limited by pain on this date. Patient would benefit from continued skilled acute OT 4x/wk to improve functional mobility, increase independence with ADLs, and address established goals. Recommending moderate intensity therapy once medically appropriate for discharge to increase maximal independence, reduce burden of care, and ensure safety.     Rehab Prognosis:  Good; patient would benefit from acute skilled OT services to address these deficits and reach maximum level of function.       Plan:     Patient to be seen 4 x/week to address the above listed problems via self-care/home management, therapeutic exercises, therapeutic activities, neuromuscular re-education  Plan of Care Expires: 02/20/24  Plan of Care Reviewed with: patient    Subjective     Chief Complaint: pain and weakness  Patient/Family Comments/goals: patient agreed to therapy  Pain/Comfort:  Pain Rating 1:  (patient did not rate, but indicates a lot pain with movement)  Location - Side 1: Left  Location - Orientation 1: generalized  Location 1: hip  Pain  Addressed 1: Reposition, Distraction, Pre-medicate for activity, Cessation of Activity    Objective:     Communicated with: NSG prior to session.  Patient found HOB elevated with bed alarm, wound vac, peripheral IV, telemetry, pulse ox (continuous), blood pressure cuff, BEULAH drain, PureWick upon OT entry to room.    General Precautions: Standard, fall, contact    Orthopedic Precautions:LLE weight bearing as tolerated  Braces: N/A  Respiratory Status: Room air     Occupational Performance:     Bed Mobility:    Patient completed Rolling/Turning to Right with total assistance  Patient completed Scooting/Bridging with total assistance and 2 persons via drawsheet to get higher up in bed lying supine; anteriorly to EOB sitting EOB (patient initially needed total assistance, but progressed to CGA as she got closer to the EOB)  Patient completed Supine to Sit with maximal assistance with HOB elevated  Patient completed Sit to Supine with total assistance and 2 persons with HOB flat    Functional Mobility/Transfers:  Patient completed Sit <> Stand Transfer with maximal assistance and of 2 persons  with  rolling walker on first attempt (forward flexed posture noted). Cues needed for hand placement needed for sit<>stand with RW.   Second attempt sit<>stand with no RW HHA max A, but did not clear bed and come to a complete stand.      Activities of Daily Living:  Grooming: stand by assistance combing hair sitting EOB  Upper Body Dressing: minimum assistance [place] and donning front gown sitting EOB  Lower Body Dressing: total assistance [place] and donning socks  Toileting: total assistance due to patient has PureWik in place during session. Unhooked when EOB, but attached it again at end of session and fixed it prior to exiting room in place. Clean gallo pads underneath patient.     [facility] 6 Click ADL: 12    Treatment & Education:  Role of OT and POC  ADL retraining  Functional mobility training  Safety  Importance EOB/OOB  activity    Co-treatment performed due to patient's multiple deficits requiring two skilled therapists to appropriately and safely assess patient's strength and endurance while facilitating functional tasks in addition to accommodating for patient's activity tolerance.     Patient left HOB elevated with all lines intact, call button in reach, bed alarm on, nurse notified, and all needs met.     GOALS:   Multidisciplinary Problems       Occupational Therapy Goals          Problem: Occupational Therapy    Goal Priority Disciplines Outcome Interventions   Occupational Therapy Goal     OT, PT/OT Ongoing, Progressing    Description: Goals to be met by:  2/20/2024    Patient will increase functional independence with ADLs by performing:    UE Dressing with Set-up Assistance.  LE Dressing with Minimal Assistance using AE as needed.  Grooming while seated with Modified [place].  Toileting from bedside commode with Maximum Assistance for hygiene and clothing management.   Supine to sit with Minimal Assistance.  Stand pivot transfers with Minimal Assistance.  Toilet transfer to bedside commode with Minimal Assistance.  Upper extremity exercise program 2x10 reps, with supervision.                         Time Tracking:     OT Date of Treatment: 01/26/24  OT Start Time: 1428  OT Stop Time: 1506  OT Total Time (min): 38 min    Billable Minutes:Self Care/Home Management 38               1/26/2024

## 2024-01-26 NOTE — ASSESSMENT & PLAN NOTE
Froilan Ray is a 78 y.o. female w/PMH of HTN, HLD, CHF, chronic pain on opioids, peripheral neuropathy, L-intertroch fx s/p TFNA (12/11/2018, Dr. Eulalio De La Cruz), who presents as transfer from OSH with large left hip fluid collection and XR showing femoral head collapse with cut-through of the TFNA helical blade. Blood cx + for MRSA, on vanc/ceftaroline, ID consulted. Pt underwent IR aspiration of L hip on 1/18 with 72cc of juan pus aspirated, gram stain + for gram positive cocci in clusters.    » s/p nail removal and abx spacer 1/19  Now s/p repeat I&D of left hip on 1/25      Pt with WBC increased to 15 today, possibly post operative, but if infectious markers continue to increase, recommend searching for other sources of infection. Fu CRP q48h    Pain control: MM  PT/OT:  TTWB LLE  DVT PPx: eliquis 5 bid for a fib, SCDs at all times when not ambulating  Abx:  vanc, likely 6 weeks vanc with chronic suppression thereafter per ID.   Cx: hip aspiration cx growing MRSA       » Dispo: Pending clinical improvement and drain removal; Trend CRP Q48h

## 2024-01-26 NOTE — NURSING
Pt had a bladder scan of  greater than 650 mL and was straight catheterized. 700 ml was removed. Wound vac still at 125mmhg and the isadora drain output 50 ml  . Pt was given her scheduled meds and pain medication.     All comfort and safety measures are being put in place. Pt care and mgt  continues.

## 2024-01-26 NOTE — ASSESSMENT & PLAN NOTE
Orthopedic Surgery following. S/p surgeries. Getting vancomycin. Infectious Disease recommended daptomycin at discharge to be given until 2/29/2024. PICC placed. Orthopedic Surgery concerned about worsening leukocytosis. She reported feeling well. Chest X-ray showed no pneumonia and urinalysis showed no UTI. If leukocytosis improves, continue with current treatment plan and discharge home.

## 2024-01-26 NOTE — PROGRESS NOTES
Pharmacokinetic Assessment Follow Up: IV Vancomycin    Vancomycin serum concentration assessment(s):    Vancomycin concentration = 26.2 mcg/mL. Drawn < 24 hours from prior scheduled dose. Also given 1 g vanc in OR on 1/25. Dose given this AM @ 0600    Vancomycin Regimen Plan:  Hold vancomycin for now  Obtain vancomycin concentration in AM 1/27  Goal trough = 15-20 mcg/mL    Thank you for the consult, will continue to follow  Alvaro GriffithD., BCPS  [id number]    Patient brief summary:  Froilan Ray is a 78 y.o. female initiated on antimicrobial therapy with IV Vancomycin for treatment of MRSA septic arthritis    Drug levels (last 3 results):  Recent Labs   Lab Result Units 01/23/24  1947/24  2359   Vancomycin, Random ug/mL 11.7  --    Vancomycin-Trough ug/mL  --  26.2*     Drug Allergies:   Review of patient's allergies indicates:  No Known Allergies    Actual Body Weight:   63.9 kg    Renal Function:   Estimated Creatinine Clearance: 44.8 mL/min (based on SCr of 1 mg/dL).,     CBC (last 72 hours):  Recent Labs   Lab Result Units 01/24/24  0432 01/25/24  0601 01/26/24  0447   WBC K/uL 10.99 14.60* 15.46*   Hemoglobin g/dL 7.3* 7.1* 7.9*   Hematocrit % 23.5* 22.3* 23.4*   Platelets K/uL 471* 475* 432   Gran % % 75.3* 79.8* 82.4*   Lymph % % 13.5* 12.0* 9.9*   Mono % % 8.6 6.4 5.6   Eosinophil % % 0.1 0.0 0.0   Basophil % % 0.4 0.3 0.2   Differential Method  Automated Automated Automated       Metabolic Panel (last 72 hours):  Recent Labs   Lab Result Units 01/24/24  0432 01/24/24  1828 01/25/24  0601 01/26/24  0447   Sodium mmol/L 132*  --  131* 131*   Potassium mmol/L 4.1  --  4.8 4.6   Chloride mmol/L 103  --  101 102   CO2 mmol/L 23  --  23 19*   Glucose mg/dL 113*  --  130* 94   Glucose, UA   --  Negative  --   --    BUN mg/dL 15  --  24* 30*   Creatinine mg/dL 0.8  --  1.2 1.0   Albumin g/dL 1.4*  --  1.3* 1.3*   Total Bilirubin mg/dL 0.4  --  0.4 0.4   Alkaline Phosphatase U/L 123  --  145* 168*    AST U/L 57*  --  46* 50*   ALT U/L 39  --  34 29       Vancomycin Administrations:  vancomycin given in the last 96 hours                     vancomycin 1,250 mg in dextrose 5 % (D5W) 250 mL IVPB (Vial-Mate) (mg) 1,250 mg New Bag 01/26/24 0616     1,250 mg New Bag 01/25/24 0440     1,250 mg New Bag 01/24/24 0209    vancomycin injection (g) 1 g Given 01/25/24 1106    vancomycin (VANCOCIN) 1,000 mg in dextrose 5 % (D5W) 250 mL IVPB (Vial-Mate) (mg) 1,000 mg New Bag 01/22/24 2021                    Microbiologic Results:  Microbiology Results (last 7 days)       Procedure Component Value Units Date/Time    Blood culture [[id number]] Collected: 01/25/24 1709    Order Status: Completed Specimen: Blood from Peripheral, Left Arm Updated: 01/26/24 0115     Blood Culture, Routine No Growth to date    Blood culture [[id number]] Collected: 01/25/24 1656    Order Status: Completed Specimen: Blood from Peripheral, Left Hand Updated: 01/26/24 0115     Blood Culture, Routine No Growth to date    Culture, Anaerobe [[id number]] Collected: 01/25/24 1113    Order Status: Sent Specimen: Wound from Hip, Left Updated: 01/25/24 1155    Aerobic culture [[id number]] Collected: 01/25/24 1113    Order Status: Sent Specimen: Wound from Hip, Left Updated: 01/25/24 1155    Culture, Anaerobe [[id number]] Collected: 01/25/24 1114    Order Status: Sent Specimen: Wound from Hip, Left Updated: 01/25/24 1154    Aerobic culture [[id number]] Collected: 01/25/24 1114    Order Status: Sent Specimen: Wound from Hip, Left Updated: 01/25/24 1154    Aerobic culture [[id number]] Collected: 01/25/24 1114    Order Status: Sent Specimen: Wound from Hip, Left Updated: 01/25/24 1153    Culture, Anaerobe [[id number]] Collected: 01/25/24 1114    Order Status: Sent Specimen: Wound from Hip, Left Updated: 01/25/24 1153    Blood culture [[id number]] Collected: 01/18/24 1655    Order Status: Completed Specimen: Blood Updated: 01/23/24 2012     Blood Culture, Routine No  growth after 5 days.    Blood culture [[id number]] Collected: 01/18/24 1704    Order Status: Completed Specimen: Blood Updated: 01/23/24 2012     Blood Culture, Routine No growth after 5 days.    Fungus culture [[id number]] Collected: 01/18/24 1006    Order Status: Completed Specimen: Joint Fluid from Hip, Left Updated: 01/23/24 1249     Fungus (Mycology) Culture Culture in progress    Narrative:      LEFT Hip JOINT FLUID    Fungus culture [[id number]] Collected: 01/18/24 1013    Order Status: Completed Specimen: Hip, Left Updated: 01/23/24 1249     Fungus (Mycology) Culture Culture in progress    Narrative:      Left hip joint aspiration    Culture, Anaerobe [[id number]] Collected: 01/18/24 1013    Order Status: Completed Specimen: Hip, Left Updated: 01/22/24 0958     Anaerobic Culture No anaerobes isolated    Narrative:      Left hip joint aspiration    Culture, Anaerobic [[id number]] Collected: 01/18/24 1006    Order Status: Completed Specimen: Joint Fluid from Hip, Left Updated: 01/22/24 0955     Anaerobic Culture No anaerobes isolated    Narrative:      LEFT Hip JOINT FLUID    Culture, Body Fluid - Bactec [[id number]]  (Abnormal)  (Susceptibility) Collected: 01/18/24 1013    Order Status: Completed Specimen: Joint Fluid from Hip, Left Updated: 01/21/24 0832     Body Fluid Culture, Sterile Gram stain: Gram positive cocci in clusters resembling Staph      01/19/2024  02:09      METHICILLIN RESISTANT STAPHYLOCOCCUS AUREUS    Narrative:      Left hip joint aspiration    Aerobic culture [[id number]]  (Abnormal) Collected: 01/18/24 1013    Order Status: Completed Specimen: Hip, Left Updated: 01/20/24 1215     Aerobic Bacterial Culture METHICILLIN RESISTANT STAPHYLOCOCCUS AUREUS  Many  For susceptibility see order #[id number]      Narrative:      Left hip joint aspiration    Aerobic culture [[id number]]  (Abnormal)  (Susceptibility) Collected: 01/18/24 1006    Order Status: Completed Specimen: Hip, Left Updated:  01/20/24 1212     Aerobic Bacterial Culture METHICILLIN RESISTANT STAPHYLOCOCCUS AUREUS  Many      Narrative:      LEFT Hip JOINT FLUID    AFB Culture & Smear [[id number]] Collected: 01/18/24 1013    Order Status: Completed Specimen: Hip, Left Updated: 01/19/24 2128     AFB Culture & Smear Culture in progress     AFB CULTURE STAIN No acid fast bacilli seen.    Narrative:      Left hip joint aspiration    AFB Culture & Smear [[id number]] Collected: 01/18/24 1006    Order Status: Completed Specimen: Joint Fluid from Hip, Left Updated: 01/19/24 2128     AFB Culture & Smear Culture in progress     AFB CULTURE STAIN No acid fast bacilli seen.    Narrative:      LEFT Hip JOINT FLUID

## 2024-01-26 NOTE — SUBJECTIVE & OBJECTIVE
Principal Problem:Sepsis due to methicillin resistant Staphylococcus aureus (MRSA)    Principal Orthopedic Problem:  Left hip septic arthritis and gluteal/thigh abscess with remote history of cephalomedullary nail fixation left proximal femur s/p I&D and antibiotic spacer placement on 1/19/24 and repeat I&D on 1/25    Interval History:  VSS. NAEON. WBC increased to 15 today. Pt reports L hip pain better controlled today.  Drain with 170 cc ss output /24 hr.     Review of patient's allergies indicates:  No Known Allergies    Current Facility-Administered Medications   Medication    (Magic mouthwash) 1:1:1 diphenhydrAMINE(Benadryl) 12.5mg/5ml liq, aluminum & magnesium hydroxide-simethicone (Maalox), LIDOcaine viscous 2%    0.9%  NaCl infusion (for blood administration)    0.9%  NaCl infusion (for blood administration)    0.9%  NaCl infusion (for blood administration)    acetaminophen tablet 1,000 mg    aluminum-magnesium hydroxide-simethicone 200-200-20 mg/5 mL suspension 30 mL    amiodarone tablet 200 mg    amLODIPine tablet 5 mg    apixaban tablet 5 mg    docusate sodium capsule 100 mg    EScitalopram oxalate tablet 20 mg    guaiFENesin 100 mg/5 ml syrup 200 mg    oxyCODONE immediate release tablet 5 mg    And    oxyCODONE immediate release tablet Tab 10 mg    And    HYDROmorphone injection 1 mg    LIDOcaine viscous HCl 2% oral solution 5 mL    melatonin tablet 6 mg    methocarbamoL tablet 500 mg    metoprolol tartrate (LOPRESSOR) tablet 50 mg    naloxone 0.4 mg/mL injection 0.02 mg    nystatin 100,000 unit/mL suspension 500,000 Units    ondansetron disintegrating tablet 8 mg    polyethylene glycol packet 17 g    pregabalin capsule 200 mg    simethicone chewable tablet 80 mg    sodium chloride 0.9% flush 1-10 mL    sodium chloride 0.9% flush 10 mL    And    sodium chloride 0.9% flush 10 mL    sodium chloride 0.9% flush 10 mL    valACYclovir tablet 1,000 mg    vancomycin - pharmacy to dose     Objective:     Vital  "Signs (Most Recent):  Temp: 98.1 °F (36.7 °C) (01/26/24 1110)  Pulse: (!) 56 (01/26/24 1125)  Resp: 15 (01/26/24 1208)  BP: (!) 106/58 (01/26/24 1110)  SpO2: 97 % (01/26/24 1110) Vital Signs (24h Range):  Temp:  [97.5 °F (36.4 °C)-98.5 °F (36.9 °C)] 98.1 °F (36.7 °C)  Pulse:  [56-86] 56  Resp:  [11-22] 15  SpO2:  [95 %-100 %] 97 %  BP: ()/(51-67) 106/58     Weight: 63.9 kg (140 lb 14 oz)  Height: 5' 6" (167.6 cm)  Body mass index is 22.74 kg/m².      Intake/Output Summary (Last 24 hours) at 1/26/2024 1216  Last data filed at 1/26/2024 1116  Gross per 24 hour   Intake 290 ml   Output 1120 ml   Net -830 ml          Ortho/SPM Exam    Dressing is clean dry and intact.  Drain is intact.  Drain output ss in last 24 hours.  Serosanguineous without juan purulence. WV in place     Significant Labs: CBC:   Recent Labs   Lab 01/25/24  0601 01/26/24  0447   WBC 14.60* 15.46*   HGB 7.1* 7.9*   HCT 22.3* 23.4*   * 432       CMP:   Recent Labs   Lab 01/25/24  0601 01/26/24  0447   * 131*   K 4.8 4.6    102   CO2 23 19*   * 94   BUN 24* 30*   CREATININE 1.2 1.0   CALCIUM 7.7* 8.2*   PROT 5.5* 5.5*   ALBUMIN 1.3* 1.3*   BILITOT 0.4 0.4   ALKPHOS 145* 168*   AST 46* 50*   ALT 34 29   ANIONGAP 7* 10       CRP:   Recent Labs   Lab 01/25/24  0919   .6*         "

## 2024-01-26 NOTE — PROGRESS NOTES
[facility] - Telemetry Stepdown  Orthopedics  Progress Note    Patient Name: Froilan Ray  MRN: [id number]  Admission Date: 1/17/2024  Hospital Length of Stay: 9 days  Attending Provider: Jerry Jensen MD  Primary Care Provider: Alphonse Boothe III, MD  Follow-up For: Procedure(s) (LRB):  Irrigation and debridement left hip, (Left)    Post-Operative Day: 1 Day Post-Op  Subjective:     Principal Problem:Sepsis due to methicillin resistant Staphylococcus aureus (MRSA)    Principal Orthopedic Problem:  Left hip septic arthritis and gluteal/thigh abscess with remote history of cephalomedullary nail fixation left proximal femur s/p I&D and antibiotic spacer placement on 1/19/24 and repeat I&D on 1/25    Interval History:  VSS. NAEON. WBC increased to 15 today. Pt reports L hip pain better controlled today.  Drain with 170 cc ss output /24 hr.     Review of patient's allergies indicates:  No Known Allergies    Current Facility-Administered Medications   Medication    (Magic mouthwash) 1:1:1 diphenhydrAMINE(Benadryl) 12.5mg/5ml liq, aluminum & magnesium hydroxide-simethicone (Maalox), LIDOcaine viscous 2%    0.9%  NaCl infusion (for blood administration)    0.9%  NaCl infusion (for blood administration)    0.9%  NaCl infusion (for blood administration)    acetaminophen tablet 1,000 mg    aluminum-magnesium hydroxide-simethicone 200-200-20 mg/5 mL suspension 30 mL    amiodarone tablet 200 mg    amLODIPine tablet 5 mg    apixaban tablet 5 mg    docusate sodium capsule 100 mg    EScitalopram oxalate tablet 20 mg    guaiFENesin 100 mg/5 ml syrup 200 mg    oxyCODONE immediate release tablet 5 mg    And    oxyCODONE immediate release tablet Tab 10 mg    And    HYDROmorphone injection 1 mg    LIDOcaine viscous HCl 2% oral solution 5 mL    melatonin tablet 6 mg    methocarbamoL tablet 500 mg    metoprolol tartrate (LOPRESSOR) tablet 50 mg    naloxone 0.4 mg/mL injection 0.02 mg    nystatin 100,000 unit/mL suspension 500,000  "Units    ondansetron disintegrating tablet 8 mg    polyethylene glycol packet 17 g    pregabalin capsule 200 mg    simethicone chewable tablet 80 mg    sodium chloride 0.9% flush 1-10 mL    sodium chloride 0.9% flush 10 mL    And    sodium chloride 0.9% flush 10 mL    sodium chloride 0.9% flush 10 mL    valACYclovir tablet 1,000 mg    vancomycin - pharmacy to dose     Objective:     Vital Signs (Most Recent):  Temp: 98.1 °F (36.7 °C) (01/26/24 1110)  Pulse: (!) 56 (01/26/24 1125)  Resp: 15 (01/26/24 1208)  BP: (!) 106/58 (01/26/24 1110)  SpO2: 97 % (01/26/24 1110) Vital Signs (24h Range):  Temp:  [97.5 °F (36.4 °C)-98.5 °F (36.9 °C)] 98.1 °F (36.7 °C)  Pulse:  [56-86] 56  Resp:  [11-22] 15  SpO2:  [95 %-100 %] 97 %  BP: ()/(51-67) 106/58     Weight: 63.9 kg (140 lb 14 oz)  Height: 5' 6" (167.6 cm)  Body mass index is 22.74 kg/m².      Intake/Output Summary (Last 24 hours) at 1/26/2024 1216  Last data filed at 1/26/2024 1116  Gross per 24 hour   Intake 290 ml   Output 1120 ml   Net -830 ml         Ortho/SPM Exam    Dressing is clean dry and intact.  Drain is intact.  Drain output ss in last 24 hours.  Serosanguineous without ujan purulence. WV in place     Significant Labs: CBC:   Recent Labs   Lab 01/25/24  0601 01/26/24  0447   WBC 14.60* 15.46*   HGB 7.1* 7.9*   HCT 22.3* 23.4*   * 432       CMP:   Recent Labs   Lab 01/25/24  0601 01/26/24  0447   * 131*   K 4.8 4.6    102   CO2 23 19*   * 94   BUN 24* 30*   CREATININE 1.2 1.0   CALCIUM 7.7* 8.2*   PROT 5.5* 5.5*   ALBUMIN 1.3* 1.3*   BILITOT 0.4 0.4   ALKPHOS 145* 168*   AST 46* 50*   ALT 34 29   ANIONGAP 7* 10       CRP:   Recent Labs   Lab 01/25/24  0919   .6*         Assessment/Plan:     Staphylococcal arthritis of left hip  Froilan Ray is a 78 y.o. female w/PMH of HTN, HLD, CHF, chronic pain on opioids, peripheral neuropathy, L-intertroch fx s/p TFNA (12/11/2018, Dr. Eulalio De La Cruz), who presents as transfer from " OSH with large left hip fluid collection and XR showing femoral head collapse with cut-through of the TFNA helical blade. Blood cx + for MRSA, on vanc/ceftaroline, ID consulted. Pt underwent IR aspiration of L hip on 1/18 with 72cc of juan pus aspirated, gram stain + for gram positive cocci in clusters.    » s/p nail removal and abx spacer 1/19  Now s/p repeat I&D of left hip on 1/25      Pt with WBC increased to 15 today, possibly post operative, but if infectious markers continue to increase, recommend searching for other sources of infection. Fu CRP q48h    Pain control: MM  PT/OT:  TTWB LLE  DVT PPx: eliquis 5 bid for a fib, SCDs at all times when not ambulating  Abx:  vanc, likely 6 weeks vanc with chronic suppression thereafter per ID.   Cx: hip aspiration cx growing MRSA       » Dispo: Pending clinical improvement and drain removal; Trend CRP Q48h        Painful orthopaedic hardware  .          Paul Jimenez MD  Orthopedics  [facility] - Telemetry Stepdown

## 2024-01-26 NOTE — SUBJECTIVE & OBJECTIVE
Interval History: No new symptoms. Just wants to know when she can go home.     Review of Systems   Constitutional:  Negative for chills and fever.   Musculoskeletal:  Positive for arthralgias and gait problem.     Objective:     Vital Signs (Most Recent):  Temp: 98.1 °F (36.7 °C) (01/26/24 1110)  Pulse: (!) 56 (01/26/24 1125)  Resp: 15 (01/26/24 1208)  BP: (!) 106/58 (01/26/24 1110)  SpO2: 97 % (01/26/24 1110) Vital Signs (24h Range):  Temp:  [97.5 °F (36.4 °C)-98.5 °F (36.9 °C)] 98.1 °F (36.7 °C)  Pulse:  [56-86] 56  Resp:  [15-22] 15  SpO2:  [95 %-98 %] 97 %  BP: ()/(54-67) 106/58     Weight: 63.9 kg (140 lb 14 oz)  Body mass index is 22.74 kg/m².    Intake/Output Summary (Last 24 hours) at 1/26/2024 1340  Last data filed at 1/26/2024 1116  Gross per 24 hour   Intake 290 ml   Output 1120 ml   Net -830 ml           Physical Exam  Vitals and nursing note reviewed.   Constitutional:       General: She is not in acute distress.     Appearance: She is well-developed and normal weight. She is not diaphoretic.   Musculoskeletal:      Comments: Left hip dressed with drain   Neurological:      Mental Status: She is alert and oriented to person, place, and time. Mental status is at baseline.      Motor: No seizure activity.   Psychiatric:         Attention and Perception: Attention normal.         Mood and Affect: Affect normal.         Behavior: Behavior is not aggressive. Behavior is cooperative.             Significant Labs: All pertinent labs within the past 24 hours have been reviewed.    Significant Imaging: I have reviewed all pertinent imaging results/findings within the past 24 hours.  X-Ray Chest AP Portable 1/26/24: FINDINGS:   The tip of the right PICC line is retracted in its position compared to earlier exam, now superimposing the right medial clavicle and likely at the right SVC-caval junction.  Clinical correlation.  Reconfirmed normal heart size and within limits of normal pulmonary vasculature.   Stable elevated right hemidiaphragm.  No focal right lung infiltrate, right pleural fluid, or right pneumothorax.  Stable deformity right humeral head felt indicative of remote healed fracture.  Some stable bilateral right greater than left shoulder girdle arthritic changes and mild thoracic dextrocurvature and some degenerative changes in the spine.  EKG leads superimposed chest and abdomen.

## 2024-01-27 LAB
ALBUMIN SERPL BCP-MCNC: 1.5 G/DL (ref 3.5–5.2)
ALP SERPL-CCNC: 136 U/L (ref 55–135)
ALT SERPL W/O P-5'-P-CCNC: 33 U/L (ref 10–44)
ANION GAP SERPL CALC-SCNC: 8 MMOL/L (ref 8–16)
AST SERPL-CCNC: 69 U/L (ref 10–40)
BASOPHILS # BLD AUTO: 0.04 K/UL (ref 0–0.2)
BASOPHILS NFR BLD: 0.4 % (ref 0–1.9)
BILIRUB SERPL-MCNC: 0.3 MG/DL (ref 0.1–1)
BLD PROD TYP BPU: NORMAL
BLD PROD TYP BPU: NORMAL
BLOOD UNIT EXPIRATION DATE: NORMAL
BLOOD UNIT EXPIRATION DATE: NORMAL
BLOOD UNIT TYPE CODE: 7300
BLOOD UNIT TYPE CODE: 7300
BLOOD UNIT TYPE: NORMAL
BLOOD UNIT TYPE: NORMAL
BUN SERPL-MCNC: 26 MG/DL (ref 8–23)
CALCIUM SERPL-MCNC: 8.8 MG/DL (ref 8.7–10.5)
CHLORIDE SERPL-SCNC: 104 MMOL/L (ref 95–110)
CO2 SERPL-SCNC: 19 MMOL/L (ref 23–29)
CODING SYSTEM: NORMAL
CODING SYSTEM: NORMAL
CREAT SERPL-MCNC: 0.8 MG/DL (ref 0.5–1.4)
CROSSMATCH INTERPRETATION: NORMAL
CROSSMATCH INTERPRETATION: NORMAL
CRP SERPL-MCNC: 134.8 MG/L (ref 0–8.2)
DIFFERENTIAL METHOD BLD: ABNORMAL
DISPENSE STATUS: NORMAL
DISPENSE STATUS: NORMAL
EOSINOPHIL # BLD AUTO: 0 K/UL (ref 0–0.5)
EOSINOPHIL NFR BLD: 0.1 % (ref 0–8)
ERYTHROCYTE [DISTWIDTH] IN BLOOD BY AUTOMATED COUNT: 18.4 % (ref 11.5–14.5)
EST. GFR  (NO RACE VARIABLE): >60 ML/MIN/1.73 M^2
GLUCOSE SERPL-MCNC: 70 MG/DL (ref 70–110)
HCT VFR BLD AUTO: 25 % (ref 37–48.5)
HGB BLD-MCNC: 7.8 G/DL (ref 12–16)
IMM GRANULOCYTES # BLD AUTO: 0.24 K/UL (ref 0–0.04)
IMM GRANULOCYTES NFR BLD AUTO: 2.3 % (ref 0–0.5)
LYMPHOCYTES # BLD AUTO: 1.3 K/UL (ref 1–4.8)
LYMPHOCYTES NFR BLD: 12.7 % (ref 18–48)
MCH RBC QN AUTO: 27.6 PG (ref 27–31)
MCHC RBC AUTO-ENTMCNC: 31.2 G/DL (ref 32–36)
MCV RBC AUTO: 88 FL (ref 82–98)
MONOCYTES # BLD AUTO: 0.7 K/UL (ref 0.3–1)
MONOCYTES NFR BLD: 6.8 % (ref 4–15)
NEUTROPHILS # BLD AUTO: 8 K/UL (ref 1.8–7.7)
NEUTROPHILS NFR BLD: 77.7 % (ref 38–73)
NRBC BLD-RTO: 0 /100 WBC
PLATELET # BLD AUTO: 445 K/UL (ref 150–450)
PMV BLD AUTO: 10.2 FL (ref 9.2–12.9)
POTASSIUM SERPL-SCNC: 4.1 MMOL/L (ref 3.5–5.1)
PROT SERPL-MCNC: 5.7 G/DL (ref 6–8.4)
RBC # BLD AUTO: 2.83 M/UL (ref 4–5.4)
SODIUM SERPL-SCNC: 131 MMOL/L (ref 136–145)
TRANS ERYTHROCYTES VOL PATIENT: NORMAL ML
TRANS ERYTHROCYTES VOL PATIENT: NORMAL ML
VANCOMYCIN SERPL-MCNC: 28 UG/ML
WBC # BLD AUTO: 10.24 K/UL (ref 3.9–12.7)

## 2024-01-27 PROCEDURE — 27000207 HC ISOLATION

## 2024-01-27 PROCEDURE — 25000003 PHARM REV CODE 250: Performed by: STUDENT IN AN ORGANIZED HEALTH CARE EDUCATION/TRAINING PROGRAM

## 2024-01-27 PROCEDURE — 80053 COMPREHEN METABOLIC PANEL: CPT | Performed by: STUDENT IN AN ORGANIZED HEALTH CARE EDUCATION/TRAINING PROGRAM

## 2024-01-27 PROCEDURE — 20600001 HC STEP DOWN PRIVATE ROOM

## 2024-01-27 PROCEDURE — 85025 COMPLETE CBC W/AUTO DIFF WBC: CPT | Performed by: STUDENT IN AN ORGANIZED HEALTH CARE EDUCATION/TRAINING PROGRAM

## 2024-01-27 PROCEDURE — 94761 N-INVAS EAR/PLS OXIMETRY MLT: CPT

## 2024-01-27 PROCEDURE — 63600175 PHARM REV CODE 636 W HCPCS: Performed by: STUDENT IN AN ORGANIZED HEALTH CARE EDUCATION/TRAINING PROGRAM

## 2024-01-27 PROCEDURE — 51798 US URINE CAPACITY MEASURE: CPT

## 2024-01-27 PROCEDURE — 36415 COLL VENOUS BLD VENIPUNCTURE: CPT

## 2024-01-27 PROCEDURE — 86140 C-REACTIVE PROTEIN: CPT

## 2024-01-27 PROCEDURE — A4216 STERILE WATER/SALINE, 10 ML: HCPCS | Performed by: STUDENT IN AN ORGANIZED HEALTH CARE EDUCATION/TRAINING PROGRAM

## 2024-01-27 PROCEDURE — 80202 ASSAY OF VANCOMYCIN: CPT | Performed by: HOSPITALIST

## 2024-01-27 PROCEDURE — 25000003 PHARM REV CODE 250: Performed by: ORTHOPAEDIC SURGERY

## 2024-01-27 RX ORDER — METOPROLOL TARTRATE 50 MG/1
50 TABLET ORAL 2 TIMES DAILY
Qty: 90 TABLET | Refills: 1
Start: 2024-01-27 | End: 2025-01-26

## 2024-01-27 RX ORDER — PREGABALIN 200 MG/1
200 CAPSULE ORAL 3 TIMES DAILY
Status: ON HOLD
Start: 2024-01-27 | End: 2024-02-18 | Stop reason: HOSPADM

## 2024-01-27 RX ORDER — OMEPRAZOLE 40 MG/1
40 CAPSULE, DELAYED RELEASE ORAL EVERY MORNING
Start: 2024-01-27 | End: 2024-03-19

## 2024-01-27 RX ORDER — VALACYCLOVIR HYDROCHLORIDE 1 G/1
1000 TABLET, FILM COATED ORAL 2 TIMES DAILY
Qty: 14 TABLET | Refills: 0 | Status: ON HOLD | OUTPATIENT
Start: 2024-01-27 | End: 2024-02-18 | Stop reason: HOSPADM

## 2024-01-27 RX ORDER — CYCLOBENZAPRINE HCL 10 MG
10 TABLET ORAL 3 TIMES DAILY PRN
Start: 2024-01-27

## 2024-01-27 RX ORDER — ESCITALOPRAM OXALATE 20 MG/1
20 TABLET ORAL NIGHTLY
Start: 2024-01-27

## 2024-01-27 RX ADMIN — NYSTATIN 500000 UNITS: 100000 SUSPENSION ORAL at 05:01

## 2024-01-27 RX ADMIN — LIDOCAINE HYDROCHLORIDE 5 ML: 20 SOLUTION ORAL; TOPICAL at 04:01

## 2024-01-27 RX ADMIN — HYDROMORPHONE HYDROCHLORIDE 1 MG: 1 INJECTION, SOLUTION INTRAMUSCULAR; INTRAVENOUS; SUBCUTANEOUS at 05:01

## 2024-01-27 RX ADMIN — LIDOCAINE HYDROCHLORIDE 5 ML: 20 SOLUTION ORAL; TOPICAL at 10:01

## 2024-01-27 RX ADMIN — VALACYCLOVIR HYDROCHLORIDE 1000 MG: 500 TABLET, FILM COATED ORAL at 08:01

## 2024-01-27 RX ADMIN — ACETAMINOPHEN 1000 MG: 500 TABLET ORAL at 02:01

## 2024-01-27 RX ADMIN — Medication 6 MG: at 08:01

## 2024-01-27 RX ADMIN — OXYCODONE HYDROCHLORIDE 10 MG: 10 TABLET ORAL at 10:01

## 2024-01-27 RX ADMIN — NYSTATIN 500000 UNITS: 100000 SUSPENSION ORAL at 01:01

## 2024-01-27 RX ADMIN — Medication 10 ML: at 05:01

## 2024-01-27 RX ADMIN — AMIODARONE HYDROCHLORIDE 200 MG: 200 TABLET ORAL at 08:01

## 2024-01-27 RX ADMIN — METHOCARBAMOL 500 MG: 500 TABLET ORAL at 02:01

## 2024-01-27 RX ADMIN — PREGABALIN 200 MG: 100 CAPSULE ORAL at 02:01

## 2024-01-27 RX ADMIN — OXYCODONE HYDROCHLORIDE 10 MG: 10 TABLET ORAL at 04:01

## 2024-01-27 RX ADMIN — NYSTATIN 500000 UNITS: 100000 SUSPENSION ORAL at 08:01

## 2024-01-27 RX ADMIN — PREGABALIN 200 MG: 100 CAPSULE ORAL at 08:01

## 2024-01-27 RX ADMIN — METOPROLOL TARTRATE 50 MG: 50 TABLET, FILM COATED ORAL at 08:01

## 2024-01-27 RX ADMIN — ESCITALOPRAM OXALATE 20 MG: 5 TABLET, FILM COATED ORAL at 08:01

## 2024-01-27 RX ADMIN — Medication 10 ML: at 11:01

## 2024-01-27 RX ADMIN — METHOCARBAMOL 500 MG: 500 TABLET ORAL at 08:01

## 2024-01-27 RX ADMIN — OXYCODONE HYDROCHLORIDE 10 MG: 10 TABLET ORAL at 08:01

## 2024-01-27 RX ADMIN — HYDROMORPHONE HYDROCHLORIDE 1 MG: 1 INJECTION, SOLUTION INTRAMUSCULAR; INTRAVENOUS; SUBCUTANEOUS at 06:01

## 2024-01-27 RX ADMIN — APIXABAN 5 MG: 5 TABLET, FILM COATED ORAL at 08:01

## 2024-01-27 RX ADMIN — LIDOCAINE HYDROCHLORIDE 5 ML: 20 SOLUTION ORAL; TOPICAL at 01:01

## 2024-01-27 RX ADMIN — LIDOCAINE HYDROCHLORIDE 5 ML: 20 SOLUTION ORAL; TOPICAL at 05:01

## 2024-01-27 RX ADMIN — Medication 10 ML: at 06:01

## 2024-01-27 RX ADMIN — DOCUSATE SODIUM 100 MG: 100 CAPSULE, LIQUID FILLED ORAL at 08:01

## 2024-01-27 RX ADMIN — HYDROMORPHONE HYDROCHLORIDE 1 MG: 1 INJECTION, SOLUTION INTRAMUSCULAR; INTRAVENOUS; SUBCUTANEOUS at 11:01

## 2024-01-27 RX ADMIN — ACETAMINOPHEN 1000 MG: 500 TABLET ORAL at 08:01

## 2024-01-27 RX ADMIN — AMLODIPINE BESYLATE 5 MG: 5 TABLET ORAL at 08:01

## 2024-01-27 RX ADMIN — LIDOCAINE HYDROCHLORIDE 5 ML: 20 SOLUTION ORAL; TOPICAL at 06:01

## 2024-01-27 NOTE — SUBJECTIVE & OBJECTIVE
Interval History: Leukocytosis resolved so no further workup needed. Now just waiting on removal of drain before going home.    Review of Systems   Constitutional:  Negative for chills and fever.   Musculoskeletal:  Positive for arthralgias and gait problem.     Objective:     Vital Signs (Most Recent):  Temp: 98.1 °F (36.7 °C) (01/27/24 0731)  Pulse: 76 (01/27/24 0731)  Resp: 18 (01/27/24 0731)  BP: 129/60 (01/27/24 0731)  SpO2: 97 % (01/27/24 0731) Vital Signs (24h Range):  Temp:  [97.6 °F (36.4 °C)-98.3 °F (36.8 °C)] 98.1 °F (36.7 °C)  Pulse:  [] 76  Resp:  [10-19] 18  SpO2:  [94 %-98 %] 97 %  BP: (106-129)/(56-73) 129/60     Weight: 63.9 kg (140 lb 14 oz)  Body mass index is 22.74 kg/m².    Intake/Output Summary (Last 24 hours) at 1/27/2024 0835  Last data filed at 1/27/2024 0718  Gross per 24 hour   Intake 250 ml   Output 1230 ml   Net -980 ml           Physical Exam  Vitals and nursing note reviewed.   Constitutional:       General: She is not in acute distress.     Appearance: She is well-developed and normal weight. She is not diaphoretic.   Musculoskeletal:      Comments: Left hip dressed with drain   Neurological:      Mental Status: She is alert and oriented to person, place, and time. Mental status is at baseline.      Motor: No seizure activity.   Psychiatric:         Attention and Perception: Attention normal.         Mood and Affect: Affect normal.         Behavior: Behavior is not aggressive. Behavior is cooperative.             Significant Labs: All pertinent labs within the past 24 hours have been reviewed.    Significant Imaging: I have reviewed all pertinent imaging results/findings within the past 24 hours.

## 2024-01-27 NOTE — PROGRESS NOTES
Pharmacokinetic Assessment Follow Up: IV Vancomycin    Vancomycin serum concentration assessment(s):    The random level was drawn correctly and can be used to guide therapy at this time. The measurement is above the desired definitive target range of 15 to 20 mcg/mL.    Last dose of vancomycin 1/24 @ 0616.     Vancomycin Regimen Plan:    Continue to hold vancomycin at this time   Random level with AM labs on 1/28  Goal trough = 15-20 mcg/mL    Drug levels (last 3 results):  Recent Labs   Lab Result Units 01/25/24  2359 01/27/24  0457   Vancomycin, Random ug/mL  --  28.0   Vancomycin-Trough ug/mL 26.2*  --        Pharmacy will continue to follow and monitor vancomycin.    Please contact pharmacy at extension [phone] for questions regarding this assessment.    Thank you for the consult,   Ainsley Sotomayor       Patient brief summary:  Froilan Ray is a 78 y.o. female initiated on antimicrobial therapy with IV Vancomycin for treatment of bone/joint infection - septic arthritis (MRSA)     The patient's current regimen is Pulse dosing based on renal function.     Drug Allergies:   Review of patient's allergies indicates:  No Known Allergies    Actual Body Weight:   63.9 kg    Renal Function:   Estimated Creatinine Clearance: 56 mL/min (based on SCr of 0.8 mg/dL).,     Dialysis Method (if applicable):  N/A    CBC (last 72 hours):  Recent Labs   Lab Result Units 01/25/24  0601 01/26/24  0447 01/27/24  0457   WBC K/uL 14.60* 15.46* 10.24   Hemoglobin g/dL 7.1* 7.9* 7.8*   Hematocrit % 22.3* 23.4* 25.0*   Platelets K/uL 475* 432 445   Gran % % 79.8* 82.4* 77.7*   Lymph % % 12.0* 9.9* 12.7*   Mono % % 6.4 5.6 6.8   Eosinophil % % 0.0 0.0 0.1   Basophil % % 0.3 0.2 0.4   Differential Method  Automated Automated Automated       Metabolic Panel (last 72 hours):  Recent Labs   Lab Result Units 01/24/24  1828 01/25/24  0601 01/26/24  0447 01/26/24  1115 01/27/24  0457   Sodium mmol/L  --  131* 131*  --  131*   Potassium mmol/L   --  4.8 4.6  --  4.1   Chloride mmol/L  --  101 102  --  104   CO2 mmol/L  --  23 19*  --  19*   Glucose mg/dL  --  130* 94  --  70   Glucose, UA  Negative  --   --  Negative  --    BUN mg/dL  --  24* 30*  --  26*   Creatinine mg/dL  --  1.2 1.0  --  0.8   Albumin g/dL  --  1.3* 1.3*  --  1.5*   Total Bilirubin mg/dL  --  0.4 0.4  --  0.3   Alkaline Phosphatase U/L  --  145* 168*  --  136*   AST U/L  --  46* 50*  --  69*   ALT U/L  --  34 29  --  33       Vancomycin Administrations:  vancomycin given in the last 96 hours                     vancomycin 1,250 mg in dextrose 5 % (D5W) 250 mL IVPB (Vial-Mate) (mg) 1,250 mg New Bag 01/26/24 0616     1,250 mg New Bag 01/25/24 0440     1,250 mg New Bag 01/24/24 0209    vancomycin injection (g) 1 g Given 01/25/24 1106                    Microbiologic Results:  Microbiology Results (last 7 days)       Procedure Component Value Units Date/Time    Blood culture [[id number]] Collected: 01/25/24 1656    Order Status: Completed Specimen: Blood from Peripheral, Left Hand Updated: 01/26/24 2012     Blood Culture, Routine No Growth to date      No Growth to date    Blood culture [[id number]] Collected: 01/25/24 1709    Order Status: Completed Specimen: Blood from Peripheral, Left Arm Updated: 01/26/24 2012     Blood Culture, Routine No Growth to date      No Growth to date    Culture, Anaerobe [[id number]] Collected: 01/25/24 1113    Order Status: Completed Specimen: Wound from Hip, Left Updated: 01/26/24 1017     Anaerobic Culture Culture in progress    Narrative:      1) left hip tissue    Culture, Anaerobe [[id number]] Collected: 01/25/24 1114    Order Status: Completed Specimen: Wound from Hip, Left Updated: 01/26/24 1017     Anaerobic Culture Culture in progress    Narrative:      2) left hip tissue    Culture, Anaerobe [[id number]] Collected: 01/25/24 1114    Order Status: Completed Specimen: Wound from Hip, Left Updated: 01/26/24 1017     Anaerobic Culture Culture in progress     Narrative:      3) left hip tissue    Aerobic culture [[id number]] Collected: 01/25/24 1113    Order Status: Sent Specimen: Wound from Hip, Left Updated: 01/25/24 1155    Aerobic culture [[id number]] Collected: 01/25/24 1114    Order Status: Sent Specimen: Wound from Hip, Left Updated: 01/25/24 1154    Aerobic culture [[id number]] Collected: 01/25/24 1114    Order Status: Sent Specimen: Wound from Hip, Left Updated: 01/25/24 1153    Blood culture [[id number]] Collected: 01/18/24 1655    Order Status: Completed Specimen: Blood Updated: 01/23/24 2012     Blood Culture, Routine No growth after 5 days.    Blood culture [[id number]] Collected: 01/18/24 1704    Order Status: Completed Specimen: Blood Updated: 01/23/24 2012     Blood Culture, Routine No growth after 5 days.    Fungus culture [[id number]] Collected: 01/18/24 1006    Order Status: Completed Specimen: Joint Fluid from Hip, Left Updated: 01/23/24 1249     Fungus (Mycology) Culture Culture in progress    Narrative:      LEFT Hip JOINT FLUID    Fungus culture [[id number]] Collected: 01/18/24 1013    Order Status: Completed Specimen: Hip, Left Updated: 01/23/24 1249     Fungus (Mycology) Culture Culture in progress    Narrative:      Left hip joint aspiration    Culture, Anaerobe [[id number]] Collected: 01/18/24 1013    Order Status: Completed Specimen: Hip, Left Updated: 01/22/24 0958     Anaerobic Culture No anaerobes isolated    Narrative:      Left hip joint aspiration    Culture, Anaerobic [[id number]] Collected: 01/18/24 1006    Order Status: Completed Specimen: Joint Fluid from Hip, Left Updated: 01/22/24 0955     Anaerobic Culture No anaerobes isolated    Narrative:      LEFT Hip JOINT FLUID    Culture, Body Fluid - Bactec [[id number]]  (Abnormal)  (Susceptibility) Collected: 01/18/24 1013    Order Status: Completed Specimen: Joint Fluid from Hip, Left Updated: 01/21/24 0832     Body Fluid Culture, Sterile Gram stain: Gram positive cocci in clusters  resembling Staph      01/19/2024  02:09      METHICILLIN RESISTANT STAPHYLOCOCCUS AUREUS    Narrative:      Left hip joint aspiration    Aerobic culture [[id number]]  (Abnormal) Collected: 01/18/24 1013    Order Status: Completed Specimen: Hip, Left Updated: 01/20/24 1215     Aerobic Bacterial Culture METHICILLIN RESISTANT STAPHYLOCOCCUS AUREUS  Many  For susceptibility see order #[id number]      Narrative:      Left hip joint aspiration    Aerobic culture [[id number]]  (Abnormal)  (Susceptibility) Collected: 01/18/24 1006    Order Status: Completed Specimen: Hip, Left Updated: 01/20/24 1212     Aerobic Bacterial Culture METHICILLIN RESISTANT STAPHYLOCOCCUS AUREUS  Many      Narrative:      LEFT Hip JOINT FLUID

## 2024-01-27 NOTE — PT/OT/SLP PROGRESS
Physical Therapy Treatment    Patient Name:  Froilan Ray   MRN:  [id number]    Recommendations:     Discharge Recommendations: Moderate Intensity Therapy  Discharge Equipment Recommendations: to be determined by next level of care  Barriers to discharge:  Increased skilled assistance required, evolving clinical presentation, and fall risk    Assessment:   Co-evaluation/treatment performed due to patient's multiple deficits requiring two skilled therapists to appropriately and safely assess patient's strength, endurance, functional mobility, and ADL performance while facilitating functional tasks in addition to accommodating for patient's activity tolerance and medical acuity.    Froilan Ray is a 78 y.o. female admitted with a medical diagnosis of Sepsis due to methicillin resistant Staphylococcus aureus (MRSA).  She presents with the following impairments/functional limitations: pain, weakness, impaired endurance, impaired functional mobility, impaired balance, decreased lower extremity function, decreased ROM, impaired coordination, orthopedic precautions, impaired joint extensibility, impaired muscle length. Patient agreeable to treatment, with fair tolerance/response observed. Demonstrated functional mobility was significantly limited by patient's pain, which required increased time for recovery. Trialled sit<>stand (x2) with updated WB status this date. At this time, patient will greatly benefit from moderate intensity skilled physical therapy services post-acutely.    Rehab Prognosis: Good; patient would benefit from acute skilled PT services to address these deficits and reach maximum level of function.    Recent Surgery: Procedure(s) (LRB):  Irrigation and debridement left hip, (Left) 1 Day Post-Op    Plan:     During this hospitalization, patient to be seen 4 x/week to address the identified rehab impairments via gait training, therapeutic activities, therapeutic exercises, neuromuscular  "re-education and progress toward the following goals:    Plan of Care Expires:  02/20/24    Subjective     Chief Complaint: "Oh God, My Hip! It hurts!" ; "I'm so sorry. I'm so weak"  Patient/Family Comments/goals: Decreased pain with functional mobility  Pain/Comfort:  Pain Rating 1: Pain behaviors present  Location - Side 1: Left  Location - Orientation 1: generalized  Location 1: hip  Pain Addressed 1: Reposition, Distraction, Cessation of Activity, Nurse notified  Pain Rating Post-Intervention 1:Unchanged      Objective:     Communicated with RN prior to session.  Patient found HOB elevated with bed alarm, telemetry, pulse ox (continuous), BEULAH drain, PureWick, wound vac, central line upon PT entry to room.     General Precautions: Standard, fall, contact, seizure   Orthopedic Precautions:LLE weight bearing as tolerated   Braces: N/A   Body mass index is 22.74 kg/m².  Oxygen Device: Room Air  Vitals: /68   Pulse 68   Temp 98.3 °F (36.8 °C) (Oral)   Resp 19   Ht 5' 6" (1.676 m)   Wt 63.9 kg (140 lb 14 oz)   SpO2 97%   Breastfeeding No   BMI 22.74 kg/m²      Functional Mobility:  Bed Mobility:     Rolling Right: x1, Total Assistance with HOB Flat. LLE support in slightly abducted position  EOB Scooting:   Anterior:  Trial 1:  Total Assistancex2  (90%) and Contact Guard Assistance (last 10%)  Posterior: Total Assistancex2  Boosting: Total Assistancex2 with HOB Flat. PT supporting LLE  Supine>Sit: x1, Maximum Assistance with HOB Elevated.   Sit>Supine: x1, Total Assistancex2 with HOB Flat    Transfers:     Sit<>Stand:   x1, Maximum Assistancex2 from Edge of Bed with Rolling Walker. Pain behaviors present. Segmented sequencing and listing to R noted. Cuing required for upright posturing and safe hand placement. Max facilitation of trunk extension at shoulders (PT/OT) and hip extension at sacrum (PT). OT providing RW stabilization.  x1 (attempt-no hip clearance), Maximum Assistance from Edge of Bed " (elevated) with HHA. Significant pain behaviors present. PT stabilizing BLEs.    Balance:   Static Sitting: Stand-By Assistance  Dynamic Sitting: Stand-By Assistance  Static Standing: Maximum Assistancex2. Pain behaviors present. Segmented sequencing, listing to R, and B knee flexion noted. Cuing required for upright posturing and safe hand placement. Max facilitation of trunk extension at shoulders (PT/OT) and hip extension at sacrum (PT). OT providing RW stabilization.   Total Time Standing: ~10sec    AM-PAC 6 CLICK MOBILITY  Turning over in bed (including adjusting bedclothes, sheets and blankets)?: 2  Sitting down on and standing up from a chair with arms (e.g., wheelchair, bedside commode, etc.): 2  Moving from lying on back to sitting on the side of the bed?: 2  Moving to and from a bed to a chair (including a wheelchair)?: 1  Need to walk in hospital room?: 1  Climbing 3-5 steps with a railing?: 1  Basic Mobility Total Score: 9     Treatment & Education:  Orientation Assessment: Aox4    Patient Education Provided on:  The role of physical therapy and how the patient can benefit from skilled services  Re-educated patient on her posterior MARTHA precautions d/t patient unable to state them.  Patient's new LLE WBAT status  The appropriateness of patient's strength status, given recent hx of multiple procedures to L hip  The importance of contacting RN, via call light, for mobility throughout the day  Pt white board updated with current therapists name and level of mobility assistance needed.     Patient Verbalized and Demonstrated understanding of all topics touched on this date. All patient questions answered within the PT scope of practice    Patient left HOB elevated with all lines intact, call button in reach, and RN notified..    GOALS:   Multidisciplinary Problems       Physical Therapy Goals          Problem: Physical Therapy    Goal Priority Disciplines Outcome Goal Variances Interventions   Physical  Therapy Goal     PT, PT/OT Ongoing, Progressing     Description: Goals to be met by: 24     Patient will increase functional independence with mobility by performin. Supine to sit with Contact Guard Assistance  2. Sit to supine with Contact Guard Assistance  3. Sit to stand transfer with Minimal Assistance  4. Bed to chair transfer with Minimal Assistance using Rolling Walker  5. Gait  x 100 feet with Minimal Assistance using Rolling Walker.                          Time Tracking:     PT Received On: 24  PT Start Time: 1428     PT Stop Time: 1506  PT Total Time (min): 38 min     Billable Minutes: Therapeutic Activity 38    Treatment Type: Treatment  PT/PTA: PT     Number of PTA visits since last PT visit: 0     2024

## 2024-01-27 NOTE — PROGRESS NOTES
"[facility] - Tele[facility]  Progress Note    Patient Name: Froilan Ray  MRN: [id number]  Patient Class: IP- Inpatient   Admission Date: 1/17/2024  Length of Stay: 10 days  Attending Physician: Jerry Jensen MD  Primary Care Provider: Alphonse Boothe III, MD        Subjective:     Principal Problem:Sepsis due to methicillin resistant Staphylococcus aureus (MRSA)        HPI:  Froilan Ray is a 78 year old white woman with hypertension, chronic systolic congestive heart failure, atrial fibrillation (anticoagulated on apixaban), irritable bowel syndrome, diverticulosis, seizure disorder, iron deficiency anemia, cervical and lumbar generative disc disease, history of L2-S1 decompression ion 2/15/2006, history of cervical decompression, history of spinal cord stimulator implant on 9/18/2013 with subsequent removal, arthritis, peripheral neuropathy, history of epigastric hernia repair on 12/7/2023, history of hysterectomy on 10/2/2017, history of left intertrochanteric femur fracture status post open reduction internal fixation with intramedullary device on 12/11/2018, chronic pain on opioids. She lives in [place], [place]. She is . Her primary care physician is Dr. Alphonse Boothe III.               She presented to [facility] on 1/16/2024 for symptoms of septic joint that had been present for "a long time". She had been "unable to do anything", basically lying in bed most of the day due to inability to bear weight on her left leg due to pain. She has chronic pain, for which she takes "2 Percocets every 6 hours", which was not relieving her pain for the past 2 to 3 weeks. She also had dry mouth. She was tachycardic and labs showed leukocytosis. CT showed fluid around her left hip previous surgical cite. She was in atrial fibrillation with rapid ventricular response and was converted to sinus rhythm with diltiazem and amiodarone transfusions. She was given " antibiotics. She was transferred to [facility] on the night of 1/17/2024 for Orthopedic Surgery evaluation and admitted to Hospital Medicine Team S.     Overview/Hospital Course:  She underwent joint aspiration finding purulent fluid with culture growing Staphylococcus. Blood culture grew methicillin-resistant Staphylococcus aureus. She was put on piperacillin-tazobactam and vancomycin initially. Piperacillin-tazobactam was stopped. Orthopedic Surgery performed hip washout and debridement with proximal femoral resection with placement of antibiotic cement spacer, removal of cephalomedullary nail, incision and drainage with irrigation of deep abscess, excisional debridement of fascia and muscle. Drain and wound vac were placed. She developed painful vesicular lesions on her tongue after starting trimethoprim-sulfamethoxazole. She was given viscous lidocaine and empiric valganciclovir. HSV PCR was indeterminate. HSV DNA was detected but could not differentiate between 1 and 2. Infectious Disease was consulted and recommended switching to daptomycin at discharge and giving 8 mg/kg daily until 2/29/2024. Repeat blood culture had no growth. PICC was placed. Lumbar spine noted no infection. Physical and Occupational Therapy were consulted. She declined skilled nursing facility placement. Orthopedic Surgery performed another incision and debridement on 1/25/2024 due to worsening signs of infection, but no purulence was found and Orthopedic Surgery suspected another source of infection. She had no new symptoms and felt well enough to go home. Chest X-ray showed no pneumonia. Urinalysis showed no urine infection. Leukocytosis resolved on 1/27/2024.    Interval History: Leukocytosis resolved so no further workup needed. Now just waiting on removal of drain before going home.    Review of Systems   Constitutional:  Negative for chills and fever.   Musculoskeletal:  Positive for arthralgias and gait  problem.     Objective:     Vital Signs (Most Recent):  Temp: 98.1 °F (36.7 °C) (01/27/24 0731)  Pulse: 76 (01/27/24 0731)  Resp: 18 (01/27/24 0731)  BP: 129/60 (01/27/24 0731)  SpO2: 97 % (01/27/24 0731) Vital Signs (24h Range):  Temp:  [97.6 °F (36.4 °C)-98.3 °F (36.8 °C)] 98.1 °F (36.7 °C)  Pulse:  [] 76  Resp:  [10-19] 18  SpO2:  [94 %-98 %] 97 %  BP: (106-129)/(56-73) 129/60     Weight: 63.9 kg (140 lb 14 oz)  Body mass index is 22.74 kg/m².    Intake/Output Summary (Last 24 hours) at 1/27/2024 0835  Last data filed at 1/27/2024 0718  Gross per 24 hour   Intake 250 ml   Output 1230 ml   Net -980 ml           Physical Exam  Vitals and nursing note reviewed.   Constitutional:       General: She is not in acute distress.     Appearance: She is well-developed and normal weight. She is not diaphoretic.   Musculoskeletal:      Comments: Left hip dressed with drain   Neurological:      Mental Status: She is alert and oriented to person, place, and time. Mental status is at baseline.      Motor: No seizure activity.   Psychiatric:         Attention and Perception: Attention normal.         Mood and Affect: Affect normal.         Behavior: Behavior is not aggressive. Behavior is cooperative.             Significant Labs: All pertinent labs within the past 24 hours have been reviewed.    Significant Imaging: I have reviewed all pertinent imaging results/findings within the past 24 hours.    Assessment/Plan:      * Sepsis due to methicillin resistant Staphylococcus aureus (MRSA)  Orthopedic Surgery following. S/p surgeries. Getting vancomycin. Infectious Disease recommended daptomycin at discharge to be given until 2/29/2024. PICC placed. Can go home when drain removed.    Oral lesion  Giving empiric valacyclovir and viscous lidocaine. -    MRSA bacteremia  See primary problem.    Staphylococcal arthritis of left hip  See primary problem.    Painful orthopaedic hardware  See primary problem    (HFpEF) heart failure  with preserved ejection fraction  Control hypertension. Monitor intake/output.    Depression  Continue home escitalopram.        Chronic atrial fibrillation with RVR  Continue home amiodarone, metoprolol, apixaban.      Iron deficiency anemia  Chronic, gets iron infusions.    Peripheral neuropathy  Continue home pregabalin.    Essential hypertension  She takes amlodipine-benazepril, metoprolol tartrate. Giving hospital formulary alternative of home antihypertensives. Monitor blood pressures.    Chronic pain with uncomplicated opioid dependence  She takes oxycodone-acetaminophen prn. Giving oxycodone prn with hydromorphone for breakthrough pain in the setting of acute pain.        VTE Risk Mitigation (From admission, onward)           Ordered     apixaban tablet 5 mg  2 times daily         01/19/24 1446     Reason for No Pharmacological VTE Prophylaxis  Once        Question:  Reasons:  Answer:  Already adequately anticoagulated on oral Anticoagulants    01/17/24 2037     IP VTE HIGH RISK PATIENT  Once         01/17/24 2037     Place sequential compression device  Until discontinued         01/17/24 2037                    Discharge Planning   BAILEY: 1/28/2024     Code Status: Full Code   Is the patient medically ready for discharge?: No    Reason for patient still in hospital (select all that apply): Other (specify) drain removal  Discharge Plan A: Home Health   Discharge Delays: None known at this time              Jerry Jensen MD  Department of Hospital Medicine   [facility] - Telemetry Stepdown

## 2024-01-27 NOTE — SUBJECTIVE & OBJECTIVE
Principal Problem:Sepsis due to methicillin resistant Staphylococcus aureus (MRSA)    Principal Orthopedic Problem:  Left hip septic arthritis and gluteal/thigh abscess with remote history of cephalomedullary nail fixation left proximal femur s/p I&D and antibiotic spacer placement on 1/19/24 and repeat I&D on 1/25    Interval History:  VSS. NAEON. WBC down to 10 today. Pt reports L hip pain better controlled today.  Drain with 30 cc ss output /24 hr.     Review of patient's allergies indicates:  No Known Allergies    Current Facility-Administered Medications   Medication    (Magic mouthwash) 1:1:1 diphenhydrAMINE(Benadryl) 12.5mg/5ml liq, aluminum & magnesium hydroxide-simethicone (Maalox), LIDOcaine viscous 2%    0.9%  NaCl infusion (for blood administration)    0.9%  NaCl infusion (for blood administration)    0.9%  NaCl infusion (for blood administration)    acetaminophen tablet 1,000 mg    aluminum-magnesium hydroxide-simethicone 200-200-20 mg/5 mL suspension 30 mL    amiodarone tablet 200 mg    amLODIPine tablet 5 mg    apixaban tablet 5 mg    docusate sodium capsule 100 mg    EScitalopram oxalate tablet 20 mg    guaiFENesin 100 mg/5 ml syrup 200 mg    oxyCODONE immediate release tablet 5 mg    And    oxyCODONE immediate release tablet Tab 10 mg    And    HYDROmorphone injection 1 mg    LIDOcaine viscous HCl 2% oral solution 5 mL    melatonin tablet 6 mg    methocarbamoL tablet 500 mg    metoprolol tartrate (LOPRESSOR) tablet 50 mg    naloxone 0.4 mg/mL injection 0.02 mg    nystatin 100,000 unit/mL suspension 500,000 Units    ondansetron disintegrating tablet 8 mg    polyethylene glycol packet 17 g    pregabalin capsule 200 mg    simethicone chewable tablet 80 mg    sodium chloride 0.9% flush 1-10 mL    sodium chloride 0.9% flush 10 mL    And    sodium chloride 0.9% flush 10 mL    sodium chloride 0.9% flush 10 mL    valACYclovir tablet 1,000 mg    vancomycin - pharmacy to dose     Objective:     Vital Signs  "(Most Recent):  Temp: 97.6 °F (36.4 °C) (01/27/24 0343)  Pulse: 66 (01/27/24 0343)  Resp: 16 (01/27/24 0631)  BP: 121/73 (01/27/24 0343)  SpO2: 96 % (01/27/24 0449) Vital Signs (24h Range):  Temp:  [97.6 °F (36.4 °C)-98.3 °F (36.8 °C)] 97.6 °F (36.4 °C)  Pulse:  [] 66  Resp:  [10-19] 16  SpO2:  [94 %-98 %] 96 %  BP: (106-121)/(56-73) 121/73     Weight: 63.9 kg (140 lb 14 oz)  Height: 5' 6" (167.6 cm)  Body mass index is 22.74 kg/m².      Intake/Output Summary (Last 24 hours) at 1/27/2024 0703  Last data filed at 1/26/2024 1826  Gross per 24 hour   Intake 400 ml   Output 630 ml   Net -230 ml          Ortho/SPM Exam    Dressing is clean dry and intact.  Drain is intact.  Drain output ss in last 24 hours.  Serosanguineous without juan purulence. WV in place     Significant Labs: CBC:   Recent Labs   Lab 01/26/24 0447 01/27/24 0457   WBC 15.46* 10.24   HGB 7.9* 7.8*   HCT 23.4* 25.0*    445       CMP:   Recent Labs   Lab 01/26/24 0447 01/27/24 0457   * 131*   K 4.6 4.1    104   CO2 19* 19*   GLU 94 70   BUN 30* 26*   CREATININE 1.0 0.8   CALCIUM 8.2* 8.8   PROT 5.5* 5.7*   ALBUMIN 1.3* 1.5*   BILITOT 0.4 0.3   ALKPHOS 168* 136*   AST 50* 69*   ALT 29 33   ANIONGAP 10 8       CRP:   Recent Labs   Lab 01/25/24  0919   .6*         "

## 2024-01-27 NOTE — PLAN OF CARE
Problem: Adult Inpatient Plan of Care  Goal: Plan of Care Review  Outcome: Ongoing, Progressing  Goal: Patient-Specific Goal (Individualized)  Outcome: Ongoing, Progressing  Goal: Absence of Hospital-Acquired Illness or Injury  Outcome: Ongoing, Progressing  Goal: Optimal Comfort and Wellbeing  Outcome: Ongoing, Progressing  Goal: Readiness for Transition of Care  Outcome: Ongoing, Progressing     Problem: Adjustment to Illness (Sepsis/Septic Shock)  Goal: Optimal Coping  Outcome: Ongoing, Progressing     Problem: Fluid and Electrolyte Imbalance (Acute Kidney Injury/Impairment)  Goal: Fluid and Electrolyte Balance  Outcome: Ongoing, Progressing     Problem: Oral Intake Inadequate (Acute Kidney Injury/Impairment)  Goal: Optimal Nutrition Intake  Outcome: Ongoing, Progressing     Problem: Skin Injury Risk Increased  Goal: Skin Health and Integrity  Outcome: Ongoing, Progressing     Problem: Fall Injury Risk  Goal: Absence of Fall and Fall-Related Injury  Outcome: Ongoing, Progressing     Problem: Infection  Goal: Absence of Infection Signs and Symptoms  Outcome: Ongoing, Progressing

## 2024-01-27 NOTE — PROGRESS NOTES
[facility] - Telemetry Stepdown  Orthopedics  Progress Note    Patient Name: Froilan Ray  MRN: [id number]  Admission Date: 1/17/2024  Hospital Length of Stay: 10 days  Attending Provider: Jerry Jensen MD  Primary Care Provider: Alphonse Boothe III, MD  Follow-up For: Procedure(s) (LRB):  Irrigation and debridement left hip, (Left)    Post-Operative Day: 2 Days Post-Op  Subjective:     Principal Problem:Sepsis due to methicillin resistant Staphylococcus aureus (MRSA)    Principal Orthopedic Problem:  Left hip septic arthritis and gluteal/thigh abscess with remote history of cephalomedullary nail fixation left proximal femur s/p I&D and antibiotic spacer placement on 1/19/24 and repeat I&D on 1/25    Interval History:  VSS. NAEON. WBC down to 10 today. Pt reports L hip pain better controlled today.  Drain with 30 cc ss output /24 hr.     Review of patient's allergies indicates:  No Known Allergies    Current Facility-Administered Medications   Medication    (Magic mouthwash) 1:1:1 diphenhydrAMINE(Benadryl) 12.5mg/5ml liq, aluminum & magnesium hydroxide-simethicone (Maalox), LIDOcaine viscous 2%    0.9%  NaCl infusion (for blood administration)    0.9%  NaCl infusion (for blood administration)    0.9%  NaCl infusion (for blood administration)    acetaminophen tablet 1,000 mg    aluminum-magnesium hydroxide-simethicone 200-200-20 mg/5 mL suspension 30 mL    amiodarone tablet 200 mg    amLODIPine tablet 5 mg    apixaban tablet 5 mg    docusate sodium capsule 100 mg    EScitalopram oxalate tablet 20 mg    guaiFENesin 100 mg/5 ml syrup 200 mg    oxyCODONE immediate release tablet 5 mg    And    oxyCODONE immediate release tablet Tab 10 mg    And    HYDROmorphone injection 1 mg    LIDOcaine viscous HCl 2% oral solution 5 mL    melatonin tablet 6 mg    methocarbamoL tablet 500 mg    metoprolol tartrate (LOPRESSOR) tablet 50 mg    naloxone 0.4 mg/mL injection 0.02 mg    nystatin 100,000 unit/mL suspension 500,000  "Units    ondansetron disintegrating tablet 8 mg    polyethylene glycol packet 17 g    pregabalin capsule 200 mg    simethicone chewable tablet 80 mg    sodium chloride 0.9% flush 1-10 mL    sodium chloride 0.9% flush 10 mL    And    sodium chloride 0.9% flush 10 mL    sodium chloride 0.9% flush 10 mL    valACYclovir tablet 1,000 mg    vancomycin - pharmacy to dose     Objective:     Vital Signs (Most Recent):  Temp: 97.6 °F (36.4 °C) (01/27/24 0343)  Pulse: 66 (01/27/24 0343)  Resp: 16 (01/27/24 0631)  BP: 121/73 (01/27/24 0343)  SpO2: 96 % (01/27/24 0449) Vital Signs (24h Range):  Temp:  [97.6 °F (36.4 °C)-98.3 °F (36.8 °C)] 97.6 °F (36.4 °C)  Pulse:  [] 66  Resp:  [10-19] 16  SpO2:  [94 %-98 %] 96 %  BP: (106-121)/(56-73) 121/73     Weight: 63.9 kg (140 lb 14 oz)  Height: 5' 6" (167.6 cm)  Body mass index is 22.74 kg/m².      Intake/Output Summary (Last 24 hours) at 1/27/2024 0703  Last data filed at 1/26/2024 1826  Gross per 24 hour   Intake 400 ml   Output 630 ml   Net -230 ml         Ortho/SPM Exam    Dressing is clean dry and intact.  Drain is intact.  Drain output ss in last 24 hours.  Serosanguineous without juan purulence. WV in place     Significant Labs: CBC:   Recent Labs   Lab 01/26/24 0447 01/27/24 0457   WBC 15.46* 10.24   HGB 7.9* 7.8*   HCT 23.4* 25.0*    445       CMP:   Recent Labs   Lab 01/26/24 0447 01/27/24 0457   * 131*   K 4.6 4.1    104   CO2 19* 19*   GLU 94 70   BUN 30* 26*   CREATININE 1.0 0.8   CALCIUM 8.2* 8.8   PROT 5.5* 5.7*   ALBUMIN 1.3* 1.5*   BILITOT 0.4 0.3   ALKPHOS 168* 136*   AST 50* 69*   ALT 29 33   ANIONGAP 10 8       CRP:   Recent Labs   Lab 01/25/24  0919   .6*         Assessment/Plan:     Staphylococcal arthritis of left hip  Froilan Ray is a 78 y.o. female w/PMH of HTN, HLD, CHF, chronic pain on opioids, peripheral neuropathy, L-intertroch fx s/p TFNA (12/11/2018, Dr. Eulalio De La Cruz), who presents as transfer from OSH with " large left hip fluid collection and XR showing femoral head collapse with cut-through of the TFNA helical blade. Blood cx + for MRSA, on vanc/ceftaroline, ID consulted. Pt underwent IR aspiration of L hip on 1/18 with 72cc of juan pus aspirated, gram stain + for gram positive cocci in clusters.    » s/p nail removal and abx spacer 1/19  Now s/p repeat I&D of left hip on 1/25      Pt with WBC down to 10 today. Fu CRP q48h    Pain control: MM  PT/OT:  TTWB LLE  DVT PPx: eliquis 5 bid for a fib, SCDs at all times when not ambulating  Abx:  vanc, likely 6 weeks vanc with chronic suppression thereafter per ID.   Cx: hip aspiration cx growing MRSA       » Dispo: Pending clinical improvement and drain removal; Trend CRP Q48h        Painful orthopaedic hardware  .          VICTORIANO Arambula MD  Orthopedics  [facility] - Telemetry Stepdown

## 2024-01-27 NOTE — PLAN OF CARE
Problem: Physical Therapy  Goal: Physical Therapy Goal  Description: Goals to be met by: 24     Patient will increase functional independence with mobility by performin. Supine to sit with Contact Guard Assistance  2. Sit to supine with Contact Guard Assistance  3. Sit to stand transfer with Minimal Assistance  4. Bed to chair transfer with Minimal Assistance using Rolling Walker  5. Gait  x 100 feet with Minimal Assistance using Rolling Walker.     Outcome: Ongoing, Progressing

## 2024-01-27 NOTE — ASSESSMENT & PLAN NOTE
Froilan Ray is a 78 y.o. female w/PMH of HTN, HLD, CHF, chronic pain on opioids, peripheral neuropathy, L-intertroch fx s/p TFNA (12/11/2018, Dr. Eulalio De La Cruz), who presents as transfer from OSH with large left hip fluid collection and XR showing femoral head collapse with cut-through of the TFNA helical blade. Blood cx + for MRSA, on vanc/ceftaroline, ID consulted. Pt underwent IR aspiration of L hip on 1/18 with 72cc of juan pus aspirated, gram stain + for gram positive cocci in clusters.    » s/p nail removal and abx spacer 1/19  Now s/p repeat I&D of left hip on 1/25      Pt with WBC down to 10 today. Fu CRP q48h    Pain control: MM  PT/OT:  TTWB LLE  DVT PPx: eliquis 5 bid for a fib, SCDs at all times when not ambulating  Abx:  vanc, likely 6 weeks vanc with chronic suppression thereafter per ID.   Cx: hip aspiration cx growing MRSA       » Dispo: Pending clinical improvement and drain removal; Trend CRP Q48h

## 2024-01-27 NOTE — PLAN OF CARE
[facility] Tele[facility]      HOME HEALTH ORDERS  FACE TO FACE ENCOUNTER    Patient Name: Froilan Ray  YOB: 1945    PCP: Alphonse Boothe III, MD   PCP Address: [address] [place] [address] [address] / [place] [address]  PCP Phone Number: [phone]  PCP Fax: [phone]    Encounter Date: 1/16/24    Admit to Home Health    Diagnoses:  Active Hospital Problems    Diagnosis  POA    *Sepsis due to methicillin resistant Staphylococcus aureus (MRSA) [A41.02]  Yes    Urinary retention [R33.9]  No    Painful orthopaedic hardware [T84.84XA]  Yes    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]  Yes    MRSA bacteremia [R78.81, B95.62]  Yes    Oral lesion [K13.70]  Yes    (HFpEF) heart failure with preserved ejection fraction [I50.30]  Yes     Chronic    Depression [F32.A]  Yes     Chronic    Chronic atrial fibrillation with RVR [I48.20]  Yes     Chronic    Iron deficiency anemia [D50.9]  Yes     Chronic    Essential hypertension [I10]  Yes     Chronic    Peripheral neuropathy [G62.9]  Yes     Chronic    Chronic pain with uncomplicated opioid dependence [F11.20]  Yes     Chronic      Resolved Hospital Problems   No resolved problems to display.       Follow Up Appointments:  Future Appointments   Date Time Provider Department Center   2/6/2024 11:00 AM [facility]   2/8/2024  9:00 AM Haroldo Morfin MD [facility]   2/8/2024  1:30 PM Sreedhar Carlos, NP [facility]   2/29/2024 10:00 AM Haroldo Morfin MD [facility]   3/7/2024 12:30 PM Sreedhar Carlos, NP [facility] [address]       Allergies:Review of patient's allergies indicates:  No Known Allergies    Medications: Review discharge medications with patient and family and provide education.    Current Facility-Administered Medications   Medication Dose Route Frequency Provider Last Rate Last Admin    (Magic mouthwash) 1:1:1 diphenhydrAMINE(Benadryl) 12.5mg/5ml liq, aluminum &  magnesium hydroxide-simethicone (Maalox), LIDOcaine viscous 2%  5 mL Swish & Spit Q4H PRN Santosh Hoffman MD   5 mL at 01/20/24 0918    acetaminophen tablet 1,000 mg  1,000 mg Oral TID Bulmaro Tellez MD   1,000 mg at 01/29/24 0900    aluminum-magnesium hydroxide-simethicone 200-200-20 mg/5 mL suspension 30 mL  30 mL Oral QID PRN Santosh Hoffman MD        amiodarone tablet 200 mg  200 mg Oral Daily Santosh Hoffman MD   200 mg at 01/29/24 0900    amLODIPine tablet 5 mg  5 mg Oral Daily Santosh Hoffman MD   5 mg at 01/29/24 0900    apixaban tablet 5 mg  5 mg Oral BID Paul Jimenez MD   5 mg at 01/29/24 0900    docusate sodium capsule 100 mg  100 mg Oral BID Santosh Hoffman MD   100 mg at 01/29/24 0900    EScitalopram oxalate tablet 20 mg  20 mg Oral Daily Santosh Hoffman MD   20 mg at 01/29/24 0900    guaiFENesin 100 mg/5 ml syrup 200 mg  200 mg Oral Q4H PRN Dianne Vila MD        oxyCODONE immediate release tablet 5 mg  5 mg Oral Q3H PRN Dianne Vila MD        And    oxyCODONE immediate release tablet Tab 10 mg  10 mg Oral Q3H PRN Dianne Vila MD   10 mg at 01/29/24 1110    And    HYDROmorphone injection 1 mg  1 mg Intravenous Q3H PRN Dianne Vila MD   1 mg at 01/29/24 1401    LIDOcaine viscous HCl 2% oral solution 5 mL  5 mL Mucous Membrane Q4H Dianne Vila MD   5 mL at 01/29/24 1329    melatonin tablet 6 mg  6 mg Oral Nightly PRN Santosh Hoffman MD   6 mg at 01/27/24 2017    methocarbamoL tablet 500 mg  500 mg Oral TID Paul Jimenez MD   500 mg at 01/29/24 0900    metoprolol tartrate (LOPRESSOR) tablet 50 mg  50 mg Oral BID Santosh Hoffman MD   50 mg at 01/29/24 0900    naloxone 0.4 mg/mL injection 0.02 mg  0.02 mg Intravenous PRN Santosh Hoffman MD        nystatin 100,000 unit/mL suspension 500,000 Units  500,000 Units Oral QID Santosh Hoffman MD   500,000 Units at 01/29/24 1329     ondansetron disintegrating tablet 8 mg  8 mg Oral Q8H PRN Santosh Hoffman MD        polyethylene glycol packet 17 g  17 g Oral Daily PRN Santosh Hoffman MD        pregabalin capsule 200 mg  200 mg Oral TID Santosh Hoffman MD   200 mg at 01/29/24 0900    simethicone chewable tablet 80 mg  1 tablet Oral QID PRN Santosh Hoffman MD        sodium chloride 0.9% flush 1-10 mL  1-10 mL Intravenous Q12H PRN Santosh Hoffman MD        sodium chloride 0.9% flush 10 mL  10 mL Intravenous Q6H Dianne Vila MD   10 mL at 01/29/24 1110    And    sodium chloride 0.9% flush 10 mL  10 mL Intravenous PRN Dianne Vila MD        sodium chloride 0.9% flush 10 mL  10 mL Intravenous PRN Paul Jimenez MD        valACYclovir tablet 1,000 mg  1,000 mg Oral BID Dianne Vila MD   1,000 mg at 01/29/24 0900    vancomycin (VANCOCIN) 1,000 mg in dextrose 5 % (D5W) 250 mL IVPB (Vial-Mate)  1,000 mg Intravenous Once Jerry Jensen .7 mL/hr at 01/29/24 1330 1,000 mg at 01/29/24 1330    vancomycin - pharmacy to dose   Intravenous pharmacy to manage frequency Santosh Hoffman MD         Current Discharge Medication List        START taking these medications    Details   sodium chloride 0.9% SolP 50 mL with DAPTOmycin 500 mg SolR 500 mg Inject 500 mg into the vein once daily. End date 2/29/24      valACYclovir (VALTREX) 1000 MG tablet Take 1 tablet (1,000 mg total) by mouth 2 (two) times daily. for 7 days  Qty: 14 tablet, Refills: 0           CONTINUE these medications which have CHANGED    Details   apixaban (ELIQUIS) 5 mg Tab Take 1 tablet (5 mg total) by mouth 2 (two) times daily.  Qty: 60 tablet, Refills: 11      cyclobenzaprine (FLEXERIL) 10 MG tablet Take 1 tablet (10 mg total) by mouth 3 (three) times daily as needed for Muscle spasms.    Associated Diagnoses: Failed back syndrome of lumbar spine; Lumbar radiculopathy; DDD (degenerative disc disease), lumbar; Other  spondylosis, lumbar region      EScitalopram oxalate (LEXAPRO) 20 MG tablet Take 1 tablet (20 mg total) by mouth every evening.      metoprolol tartrate (LOPRESSOR) 50 MG tablet Take 1 tablet (50 mg total) by mouth 2 (two) times daily.  Qty: 90 tablet, Refills: 1    Comments: .      omeprazole (PRILOSEC) 40 MG capsule Take 1 capsule (40 mg total) by mouth every morning.      pregabalin (LYRICA) 200 MG Cap Take 1 capsule (200 mg total) by mouth 3 (three) times daily. TAKE 1 CAPSULE(200 MG) BY MOUTH THREE TIMES DAILY    Associated Diagnoses: Failed back syndrome of lumbar spine; Lumbar radiculopathy; DDD (degenerative disc disease), lumbar; Other spondylosis, lumbar region           CONTINUE these medications which have NOT CHANGED    Details   acetaminophen (TYLENOL) 325 MG tablet Take 650 mg by mouth every 8 (eight) hours as needed for Pain.      amlodipine-benazepril 5-20 mg (LOTREL) 5-20 mg per capsule Take 1 capsule by mouth once daily.  Qty: 90 capsule, Refills: 3    Comments: .      docusate sodium (COLACE) 100 MG capsule Take 1 capsule (100 mg total) by mouth 2 (two) times daily.  Qty: 60 capsule, Refills: 1      oxyCODONE-acetaminophen (PERCOCET)  mg per tablet Take 1 tablet by mouth every 4 to 6 hours as needed for Pain.      traMADoL (ULTRAM) 50 mg tablet Take 2 tablets by mouth every 12 (twelve) hours as needed for Pain.           STOP taking these medications       amiodarone (PACERONE) 200 MG Tab Comments:   Reason for Stopping:         fluconazole (DIFLUCAN) 100 MG tablet Comments:   Reason for Stopping:         sulfamethoxazole-trimethoprim 800-160mg (BACTRIM DS) 800-160 mg Tab Comments:   Reason for Stopping:                 I have seen and examined this patient within the last 30 days. My clinical findings that support the need for the home health skilled services and home bound status are the following:no   Medical restrictions requiring assistance of another human to leave home due to  IV  infusion Needs and Weight bearing restricted.     Diet:   regular diet    Labs:  Every Monday until 2/29/24:   CBC  CMP   CRP  CPK   Fax results to Dr. Kika Armstrong   [facility] Fax Number: [phone]     Referrals/ Consults  Physical Therapy to evaluate and treat. Evaluate for home safety and equipment needs; Establish/upgrade home exercise program. Perform / instruct on therapeutic exercises, gait training, transfer training, and Range of Motion.  Occupational Therapy to evaluate and treat. Evaluate home environment for safety and equipment needs. Perform/Instruct on transfers, ADL training, ROM, and therapeutic exercises.  Aide to provide assistance with personal care, ADLs, and vital signs.    Activities:   Toe touch weight bearing on left leg, full weight bearing on right leg, do not submerge PICC or wound site in water    Nursing:   Agency to admit patient within 24 hours of hospital discharge unless specified on physician order or at patient request    SN to complete comprehensive assessment including routine vital signs. Instruct on disease process and s/s of complications to report to MD. Review/verify medication list sent home with the patient at time of discharge  and instruct patient/caregiver as needed. Frequency may be adjusted depending on start of care date.     Skilled nurse to perform up to 3 visits PRN for symptoms related to diagnosis    Notify MD if SBP > 160 or < 90; DBP > 90 or < 50; HR > 120 or < 50; Temp > 101; O2 < 88%    Ok to schedule additional visits based on staff availability and patient request on consecutive days within the home health episode.    When multiple disciplines ordered:    Start of Care occurs on Sunday - Wednesday schedule remaining discipline evaluations as ordered on separate consecutive days following the start of care.    Thursday SOC -schedule subsequent evaluations Friday and Monday the following week.     Friday - Saturday SOC - schedule subsequent  discipline evaluations on consecutive days starting Monday of the following week.    For all post-discharge communication and subsequent orders please contact patient's primary care physician. If unable to reach primary care physician or do not receive response within 30 minutes, please contact primary care physician for clinical staff order clarification    Miscellaneous   Home Infusion Therapy:   SN to perform Infusion Therapy/Central Line Care.  Review Central Line Care & Central Line Flush with patient.    Administer (drug and dose): daptomycin 500 mg daily until 2/29/24    Last dose given: NA                         Home dose due: 1/30/24    Scrub the Hub: Prior to accessing the line, always perform a 30 second alcohol scrub  Each lumen of the central line is to be flushed at least daily with 10 mL Normal Saline and 3 mL Heparin flush (10 units/mL)  Skilled Nurse (SN) may draw blood from IV access  Blood Draw Procedure:   - Aspirate at least 5 mL of blood   - Discard   - Obtain specimen   - Change injection cap   - Flush with 20 mL Normal Saline followed by a                 3-5 mL Heparin flush (10 units/mL)  Central :   - Sterile dressing changes are done weekly and as needed.   - Use chlor-hexadine scrub to cleanse site, apply Biopatch to insertion site,       apply securement device dressing   - Injection caps are changed weekly and after EVERY lab draw.   - If sterile gauze is under dressing to control oozing,                 dressing change must be performed every 24 hours until gauze is not needed.    Remove PICC after last dose on 2/29/24    Boyd Care: Instruct patient/caregiver to empty Boyd bag.  Change Boyd every month.    Home Health Aide:  Nursing Three times weekly, Physical Therapy Three times weekly, Occupational Therapy Three times weekly, and Home Health Aide Three times weekly    Wound Care Orders  Wound vac 125 mmHg continous  Follow up in Orthopedic Surgery clinic    I  certify that this patient is confined to her home and needs intermittent skilled nursing care, physical therapy, and occupational therapy.        Jerry Jensen MD  [facility]  01/29/2024

## 2024-01-27 NOTE — ASSESSMENT & PLAN NOTE
Orthopedic Surgery following. S/p surgeries. Getting vancomycin. Infectious Disease recommended daptomycin at discharge to be given until 2/29/2024. PICC placed. Can go home when drain removed.

## 2024-01-28 LAB
ALBUMIN SERPL BCP-MCNC: 1.3 G/DL (ref 3.5–5.2)
ALP SERPL-CCNC: 129 U/L (ref 55–135)
ALT SERPL W/O P-5'-P-CCNC: 25 U/L (ref 10–44)
ANION GAP SERPL CALC-SCNC: 6 MMOL/L (ref 8–16)
AST SERPL-CCNC: 31 U/L (ref 10–40)
BASOPHILS # BLD AUTO: 0.05 K/UL (ref 0–0.2)
BASOPHILS NFR BLD: 0.4 % (ref 0–1.9)
BILIRUB SERPL-MCNC: 0.4 MG/DL (ref 0.1–1)
BUN SERPL-MCNC: 18 MG/DL (ref 8–23)
CALCIUM SERPL-MCNC: 8.6 MG/DL (ref 8.7–10.5)
CHLORIDE SERPL-SCNC: 103 MMOL/L (ref 95–110)
CO2 SERPL-SCNC: 21 MMOL/L (ref 23–29)
CREAT SERPL-MCNC: 0.7 MG/DL (ref 0.5–1.4)
DIFFERENTIAL METHOD BLD: ABNORMAL
EOSINOPHIL # BLD AUTO: 0 K/UL (ref 0–0.5)
EOSINOPHIL NFR BLD: 0.1 % (ref 0–8)
ERYTHROCYTE [DISTWIDTH] IN BLOOD BY AUTOMATED COUNT: 19 % (ref 11.5–14.5)
EST. GFR  (NO RACE VARIABLE): >60 ML/MIN/1.73 M^2
GLUCOSE SERPL-MCNC: 83 MG/DL (ref 70–110)
HCT VFR BLD AUTO: 26.4 % (ref 37–48.5)
HGB BLD-MCNC: 8.2 G/DL (ref 12–16)
IMM GRANULOCYTES # BLD AUTO: 0.29 K/UL (ref 0–0.04)
IMM GRANULOCYTES NFR BLD AUTO: 2.5 % (ref 0–0.5)
LYMPHOCYTES # BLD AUTO: 1.1 K/UL (ref 1–4.8)
LYMPHOCYTES NFR BLD: 9.6 % (ref 18–48)
MCH RBC QN AUTO: 27.5 PG (ref 27–31)
MCHC RBC AUTO-ENTMCNC: 31.1 G/DL (ref 32–36)
MCV RBC AUTO: 89 FL (ref 82–98)
MONOCYTES # BLD AUTO: 0.7 K/UL (ref 0.3–1)
MONOCYTES NFR BLD: 6 % (ref 4–15)
NEUTROPHILS # BLD AUTO: 9.5 K/UL (ref 1.8–7.7)
NEUTROPHILS NFR BLD: 81.4 % (ref 38–73)
NRBC BLD-RTO: 0 /100 WBC
PLATELET # BLD AUTO: 458 K/UL (ref 150–450)
PMV BLD AUTO: 10 FL (ref 9.2–12.9)
POTASSIUM SERPL-SCNC: 4.2 MMOL/L (ref 3.5–5.1)
PROT SERPL-MCNC: 5.6 G/DL (ref 6–8.4)
RBC # BLD AUTO: 2.98 M/UL (ref 4–5.4)
SODIUM SERPL-SCNC: 130 MMOL/L (ref 136–145)
VANCOMYCIN SERPL-MCNC: 17.9 UG/ML
WBC # BLD AUTO: 11.71 K/UL (ref 3.9–12.7)

## 2024-01-28 PROCEDURE — 51701 INSERT BLADDER CATHETER: CPT

## 2024-01-28 PROCEDURE — 27000207 HC ISOLATION

## 2024-01-28 PROCEDURE — 20600001 HC STEP DOWN PRIVATE ROOM

## 2024-01-28 PROCEDURE — 80202 ASSAY OF VANCOMYCIN: CPT | Performed by: HOSPITALIST

## 2024-01-28 PROCEDURE — 80053 COMPREHEN METABOLIC PANEL: CPT | Performed by: STUDENT IN AN ORGANIZED HEALTH CARE EDUCATION/TRAINING PROGRAM

## 2024-01-28 PROCEDURE — A4216 STERILE WATER/SALINE, 10 ML: HCPCS | Performed by: STUDENT IN AN ORGANIZED HEALTH CARE EDUCATION/TRAINING PROGRAM

## 2024-01-28 PROCEDURE — 25000003 PHARM REV CODE 250: Performed by: STUDENT IN AN ORGANIZED HEALTH CARE EDUCATION/TRAINING PROGRAM

## 2024-01-28 PROCEDURE — 25000003 PHARM REV CODE 250: Performed by: ORTHOPAEDIC SURGERY

## 2024-01-28 PROCEDURE — 85025 COMPLETE CBC W/AUTO DIFF WBC: CPT | Performed by: STUDENT IN AN ORGANIZED HEALTH CARE EDUCATION/TRAINING PROGRAM

## 2024-01-28 PROCEDURE — 63600175 PHARM REV CODE 636 W HCPCS: Performed by: HOSPITALIST

## 2024-01-28 PROCEDURE — 36415 COLL VENOUS BLD VENIPUNCTURE: CPT | Performed by: HOSPITALIST

## 2024-01-28 PROCEDURE — 63600175 PHARM REV CODE 636 W HCPCS: Performed by: STUDENT IN AN ORGANIZED HEALTH CARE EDUCATION/TRAINING PROGRAM

## 2024-01-28 PROCEDURE — 25000003 PHARM REV CODE 250: Performed by: HOSPITALIST

## 2024-01-28 PROCEDURE — 51798 US URINE CAPACITY MEASURE: CPT

## 2024-01-28 RX ADMIN — LIDOCAINE HYDROCHLORIDE 5 ML: 20 SOLUTION ORAL; TOPICAL at 06:01

## 2024-01-28 RX ADMIN — LIDOCAINE HYDROCHLORIDE 5 ML: 20 SOLUTION ORAL; TOPICAL at 02:01

## 2024-01-28 RX ADMIN — PREGABALIN 200 MG: 100 CAPSULE ORAL at 08:01

## 2024-01-28 RX ADMIN — LIDOCAINE HYDROCHLORIDE 5 ML: 20 SOLUTION ORAL; TOPICAL at 09:01

## 2024-01-28 RX ADMIN — METHOCARBAMOL 500 MG: 500 TABLET ORAL at 08:01

## 2024-01-28 RX ADMIN — Medication 10 ML: at 03:01

## 2024-01-28 RX ADMIN — AMLODIPINE BESYLATE 5 MG: 5 TABLET ORAL at 09:01

## 2024-01-28 RX ADMIN — LIDOCAINE HYDROCHLORIDE 5 ML: 20 SOLUTION ORAL; TOPICAL at 03:01

## 2024-01-28 RX ADMIN — OXYCODONE HYDROCHLORIDE 10 MG: 10 TABLET ORAL at 09:01

## 2024-01-28 RX ADMIN — LIDOCAINE HYDROCHLORIDE 5 ML: 20 SOLUTION ORAL; TOPICAL at 10:01

## 2024-01-28 RX ADMIN — ACETAMINOPHEN 1000 MG: 500 TABLET ORAL at 09:01

## 2024-01-28 RX ADMIN — AMIODARONE HYDROCHLORIDE 200 MG: 200 TABLET ORAL at 09:01

## 2024-01-28 RX ADMIN — METHOCARBAMOL 500 MG: 500 TABLET ORAL at 03:01

## 2024-01-28 RX ADMIN — ESCITALOPRAM OXALATE 20 MG: 5 TABLET, FILM COATED ORAL at 09:01

## 2024-01-28 RX ADMIN — Medication 10 ML: at 06:01

## 2024-01-28 RX ADMIN — METHOCARBAMOL 500 MG: 500 TABLET ORAL at 09:01

## 2024-01-28 RX ADMIN — VALACYCLOVIR HYDROCHLORIDE 1000 MG: 500 TABLET, FILM COATED ORAL at 09:01

## 2024-01-28 RX ADMIN — OXYCODONE HYDROCHLORIDE 10 MG: 10 TABLET ORAL at 06:01

## 2024-01-28 RX ADMIN — DOCUSATE SODIUM 100 MG: 100 CAPSULE, LIQUID FILLED ORAL at 08:01

## 2024-01-28 RX ADMIN — ACETAMINOPHEN 1000 MG: 500 TABLET ORAL at 08:01

## 2024-01-28 RX ADMIN — Medication 10 ML: at 12:01

## 2024-01-28 RX ADMIN — PREGABALIN 200 MG: 100 CAPSULE ORAL at 03:01

## 2024-01-28 RX ADMIN — METOPROLOL TARTRATE 50 MG: 50 TABLET, FILM COATED ORAL at 09:01

## 2024-01-28 RX ADMIN — PREGABALIN 200 MG: 100 CAPSULE ORAL at 09:01

## 2024-01-28 RX ADMIN — ACETAMINOPHEN 1000 MG: 500 TABLET ORAL at 03:01

## 2024-01-28 RX ADMIN — HYDROMORPHONE HYDROCHLORIDE 1 MG: 1 INJECTION, SOLUTION INTRAMUSCULAR; INTRAVENOUS; SUBCUTANEOUS at 02:01

## 2024-01-28 RX ADMIN — NYSTATIN 500000 UNITS: 100000 SUSPENSION ORAL at 12:01

## 2024-01-28 RX ADMIN — NYSTATIN 500000 UNITS: 100000 SUSPENSION ORAL at 09:01

## 2024-01-28 RX ADMIN — OXYCODONE HYDROCHLORIDE 10 MG: 10 TABLET ORAL at 12:01

## 2024-01-28 RX ADMIN — APIXABAN 5 MG: 5 TABLET, FILM COATED ORAL at 09:01

## 2024-01-28 RX ADMIN — OXYCODONE HYDROCHLORIDE 10 MG: 10 TABLET ORAL at 03:01

## 2024-01-28 RX ADMIN — HYDROMORPHONE HYDROCHLORIDE 1 MG: 1 INJECTION, SOLUTION INTRAMUSCULAR; INTRAVENOUS; SUBCUTANEOUS at 07:01

## 2024-01-28 RX ADMIN — DOCUSATE SODIUM 100 MG: 100 CAPSULE, LIQUID FILLED ORAL at 09:01

## 2024-01-28 RX ADMIN — NYSTATIN 500000 UNITS: 100000 SUSPENSION ORAL at 08:01

## 2024-01-28 RX ADMIN — VANCOMYCIN HYDROCHLORIDE 500 MG: 500 INJECTION, POWDER, LYOPHILIZED, FOR SOLUTION INTRAVENOUS at 12:01

## 2024-01-28 RX ADMIN — NYSTATIN 500000 UNITS: 100000 SUSPENSION ORAL at 06:01

## 2024-01-28 RX ADMIN — METOPROLOL TARTRATE 50 MG: 50 TABLET, FILM COATED ORAL at 08:01

## 2024-01-28 RX ADMIN — APIXABAN 5 MG: 5 TABLET, FILM COATED ORAL at 08:01

## 2024-01-28 RX ADMIN — VALACYCLOVIR HYDROCHLORIDE 1000 MG: 500 TABLET, FILM COATED ORAL at 08:01

## 2024-01-28 NOTE — SUBJECTIVE & OBJECTIVE
Principal Problem:Sepsis due to methicillin resistant Staphylococcus aureus (MRSA)    Principal Orthopedic Problem:  Left hip septic arthritis and gluteal/thigh abscess with remote history of cephalomedullary nail fixation left proximal femur s/p I&D and antibiotic spacer placement on 1/19/24 and repeat I&D on 1/25    Interval History:  VSS. NAEON. WBC down to 11 today. Pt reports L hip pain better controlled today. Drain with 50 cc ss output /24 hr.  down from 284    Review of patient's allergies indicates:  No Known Allergies    Current Facility-Administered Medications   Medication    (Magic mouthwash) 1:1:1 diphenhydrAMINE(Benadryl) 12.5mg/5ml liq, aluminum & magnesium hydroxide-simethicone (Maalox), LIDOcaine viscous 2%    acetaminophen tablet 1,000 mg    aluminum-magnesium hydroxide-simethicone 200-200-20 mg/5 mL suspension 30 mL    amiodarone tablet 200 mg    amLODIPine tablet 5 mg    apixaban tablet 5 mg    docusate sodium capsule 100 mg    EScitalopram oxalate tablet 20 mg    guaiFENesin 100 mg/5 ml syrup 200 mg    oxyCODONE immediate release tablet 5 mg    And    oxyCODONE immediate release tablet Tab 10 mg    And    HYDROmorphone injection 1 mg    LIDOcaine viscous HCl 2% oral solution 5 mL    melatonin tablet 6 mg    methocarbamoL tablet 500 mg    metoprolol tartrate (LOPRESSOR) tablet 50 mg    naloxone 0.4 mg/mL injection 0.02 mg    nystatin 100,000 unit/mL suspension 500,000 Units    ondansetron disintegrating tablet 8 mg    polyethylene glycol packet 17 g    pregabalin capsule 200 mg    simethicone chewable tablet 80 mg    sodium chloride 0.9% flush 1-10 mL    sodium chloride 0.9% flush 10 mL    And    sodium chloride 0.9% flush 10 mL    sodium chloride 0.9% flush 10 mL    valACYclovir tablet 1,000 mg    vancomycin (VANCOCIN) 500 mg in dextrose 5 % in water (D5W) 100 mL IVPB (MB+)    vancomycin - pharmacy to dose     Objective:     Vital Signs (Most Recent):  Temp: 97.7 °F (36.5 °C) (01/28/24  "0732)  Pulse: 77 (01/28/24 0851)  Resp: 18 (01/28/24 0916)  BP: 139/75 (01/28/24 0732)  SpO2: 95 % (01/28/24 0414) Vital Signs (24h Range):  Temp:  [97.5 °F (36.4 °C)-98.4 °F (36.9 °C)] 97.7 °F (36.5 °C)  Pulse:  [61-85] 77  Resp:  [18-20] 18  SpO2:  [95 %-97 %] 95 %  BP: (110-146)/(61-75) 139/75     Weight: 63.9 kg (140 lb 14 oz)  Height: 5' 6" (167.6 cm)  Body mass index is 22.74 kg/m².      Intake/Output Summary (Last 24 hours) at 1/28/2024 1028  Last data filed at 1/28/2024 0530  Gross per 24 hour   Intake 0 ml   Output 2075 ml   Net -2075 ml          Ortho/SPM Exam    Dressing is clean dry and intact.  Drain is intact.  Drain output ss in last 24 hours.  Serosanguineous without juan purulence. WV in place     Significant Labs: CBC:   Recent Labs   Lab 01/27/24 0457 01/28/24 0624   WBC 10.24 11.71   HGB 7.8* 8.2*   HCT 25.0* 26.4*    458*       CMP:   Recent Labs   Lab 01/27/24 0457 01/28/24 0624   * 130*   K 4.1 4.2    103   CO2 19* 21*   GLU 70 83   BUN 26* 18   CREATININE 0.8 0.7   CALCIUM 8.8 8.6*   PROT 5.7* 5.6*   ALBUMIN 1.5* 1.3*   BILITOT 0.3 0.4   ALKPHOS 136* 129   AST 69* 31   ALT 33 25   ANIONGAP 8 6*       CRP:   Recent Labs   Lab 01/27/24 0828   .8*         "

## 2024-01-28 NOTE — PROGRESS NOTES
[facility] - Telemetry Stepdown  Orthopedics  Progress Note    Patient Name: Froilan Ray  MRN: [id number]  Admission Date: 1/17/2024  Hospital Length of Stay: 11 days  Attending Provider: Jerry Jensen MD  Primary Care Provider: Alphonse Boothe III, MD  Follow-up For: Procedure(s) (LRB):  Irrigation and debridement left hip, (Left)    Post-Operative Day: 3 Days Post-Op  Subjective:     Principal Problem:Sepsis due to methicillin resistant Staphylococcus aureus (MRSA)    Principal Orthopedic Problem:  Left hip septic arthritis and gluteal/thigh abscess with remote history of cephalomedullary nail fixation left proximal femur s/p I&D and antibiotic spacer placement on 1/19/24 and repeat I&D on 1/25    Interval History:  VSS. NAEON. WBC down to 11 today. Pt reports L hip pain better controlled today. Drain with 50 cc ss output /24 hr.  down from 284    Review of patient's allergies indicates:  No Known Allergies    Current Facility-Administered Medications   Medication    (Magic mouthwash) 1:1:1 diphenhydrAMINE(Benadryl) 12.5mg/5ml liq, aluminum & magnesium hydroxide-simethicone (Maalox), LIDOcaine viscous 2%    acetaminophen tablet 1,000 mg    aluminum-magnesium hydroxide-simethicone 200-200-20 mg/5 mL suspension 30 mL    amiodarone tablet 200 mg    amLODIPine tablet 5 mg    apixaban tablet 5 mg    docusate sodium capsule 100 mg    EScitalopram oxalate tablet 20 mg    guaiFENesin 100 mg/5 ml syrup 200 mg    oxyCODONE immediate release tablet 5 mg    And    oxyCODONE immediate release tablet Tab 10 mg    And    HYDROmorphone injection 1 mg    LIDOcaine viscous HCl 2% oral solution 5 mL    melatonin tablet 6 mg    methocarbamoL tablet 500 mg    metoprolol tartrate (LOPRESSOR) tablet 50 mg    naloxone 0.4 mg/mL injection 0.02 mg    nystatin 100,000 unit/mL suspension 500,000 Units    ondansetron disintegrating tablet 8 mg    polyethylene glycol packet 17 g    pregabalin capsule 200 mg    simethicone  "chewable tablet 80 mg    sodium chloride 0.9% flush 1-10 mL    sodium chloride 0.9% flush 10 mL    And    sodium chloride 0.9% flush 10 mL    sodium chloride 0.9% flush 10 mL    valACYclovir tablet 1,000 mg    vancomycin (VANCOCIN) 500 mg in dextrose 5 % in water (D5W) 100 mL IVPB (MB+)    vancomycin - pharmacy to dose     Objective:     Vital Signs (Most Recent):  Temp: 97.7 °F (36.5 °C) (01/28/24 0732)  Pulse: 77 (01/28/24 0851)  Resp: 18 (01/28/24 0916)  BP: 139/75 (01/28/24 0732)  SpO2: 95 % (01/28/24 0414) Vital Signs (24h Range):  Temp:  [97.5 °F (36.4 °C)-98.4 °F (36.9 °C)] 97.7 °F (36.5 °C)  Pulse:  [61-85] 77  Resp:  [18-20] 18  SpO2:  [95 %-97 %] 95 %  BP: (110-146)/(61-75) 139/75     Weight: 63.9 kg (140 lb 14 oz)  Height: 5' 6" (167.6 cm)  Body mass index is 22.74 kg/m².      Intake/Output Summary (Last 24 hours) at 1/28/2024 1028  Last data filed at 1/28/2024 0530  Gross per 24 hour   Intake 0 ml   Output 2075 ml   Net -2075 ml         Ortho/SPM Exam    Dressing is clean dry and intact.  Drain is intact.  Drain output ss in last 24 hours.  Serosanguineous without juan purulence. WV in place     Significant Labs: CBC:   Recent Labs   Lab 01/27/24 0457 01/28/24 0624   WBC 10.24 11.71   HGB 7.8* 8.2*   HCT 25.0* 26.4*    458*       CMP:   Recent Labs   Lab 01/27/24 0457 01/28/24 0624   * 130*   K 4.1 4.2    103   CO2 19* 21*   GLU 70 83   BUN 26* 18   CREATININE 0.8 0.7   CALCIUM 8.8 8.6*   PROT 5.7* 5.6*   ALBUMIN 1.5* 1.3*   BILITOT 0.3 0.4   ALKPHOS 136* 129   AST 69* 31   ALT 33 25   ANIONGAP 8 6*       CRP:   Recent Labs   Lab 01/27/24  0828   .8*         Assessment/Plan:     Staphylococcal arthritis of left hip  Froilan Ray is a 78 y.o. female w/PMH of HTN, HLD, CHF, chronic pain on opioids, peripheral neuropathy, L-intertroch fx s/p TFNA (12/11/2018, Dr. Eulalio De La Cruz), who presents as transfer from OSH with large left hip fluid collection and XR showing femoral " head collapse with cut-through of the TFNA helical blade. Blood cx + for MRSA, on vanc/ceftaroline, ID consulted. Pt underwent IR aspiration of L hip on 1/18 with 72cc of juan pus aspirated, gram stain + for gram positive cocci in clusters.    » s/p nail removal and abx spacer 1/19  Now s/p repeat I&D of left hip on 1/25      Pt with WBC down to 11 today. , down from 284  Drain output 50cc/24h, may pull tomorrow, will trend output      Pain control: MM  PT/OT:  TTWB LLE  DVT PPx: eliquis 5 bid for a fib, SCDs at all times when not ambulating  Abx:  vanc, likely 6 weeks vanc with chronic suppression thereafter per ID.   Cx: hip aspiration cx growing MRSA       » Dispo: Pending clinical improvement and drain removal; Trend CRP Q48h        Painful orthopaedic hardware  .          VICTORIANO Arambula MD  Orthopedics  [facility] - Telemetry Stepdown

## 2024-01-28 NOTE — NURSING
Pt was bladder scanned and a record of 1000 mL was made. Straight cath was done and pt 1300 mL of urine  was removed. Pt was cleaned up and made comfortable. Her pain meds was given prn. Wound vac is still at 125mmhg and her isadora drain had an output of 20 mL.

## 2024-01-28 NOTE — PROGRESS NOTES
"[facility] - Tele[facility] Medicine  Progress Note    Patient Name: Froilan Ray  MRN: [id number]  Patient Class: IP- Inpatient   Admission Date: 1/17/2024  Length of Stay: 11 days  Attending Physician: Jerry Jensen MD  Primary Care Provider: Alphonse Boothe III, MD        Subjective:     Principal Problem:Sepsis due to methicillin resistant Staphylococcus aureus (MRSA)        HPI:  Froilan Ray is a 78 year old white woman with hypertension, chronic systolic congestive heart failure, atrial fibrillation (anticoagulated on apixaban), irritable bowel syndrome, diverticulosis, seizure disorder, iron deficiency anemia, cervical and lumbar generative disc disease, history of L2-S1 decompression ion 2/15/2006, history of cervical decompression, history of spinal cord stimulator implant on 9/18/2013 with subsequent removal, arthritis, peripheral neuropathy, history of epigastric hernia repair on 12/7/2023, history of hysterectomy on 10/2/2017, history of left intertrochanteric femur fracture status post open reduction internal fixation with intramedullary device on 12/11/2018, chronic pain on opioids. She lives in [place], [place]. She is . Her primary care physician is Dr. Alphonse Boothe III.               She presented to [facility] on 1/16/2024 for symptoms of septic joint that had been present for "a long time". She had been "unable to do anything", basically lying in bed most of the day due to inability to bear weight on her left leg due to pain. She has chronic pain, for which she takes "2 Percocets every 6 hours", which was not relieving her pain for the past 2 to 3 weeks. She also had dry mouth. She was tachycardic and labs showed leukocytosis. CT showed fluid around her left hip previous surgical cite. She was in atrial fibrillation with rapid ventricular response and was converted to sinus rhythm with diltiazem and amiodarone transfusions. She was given " antibiotics. She was transferred to [facility] on the night of 1/17/2024 for Orthopedic Surgery evaluation and admitted to Hospital Medicine Team S.     Overview/Hospital Course:  She underwent joint aspiration finding purulent fluid with culture growing Staphylococcus. Blood culture grew methicillin-resistant Staphylococcus aureus. She was put on piperacillin-tazobactam and vancomycin initially. Piperacillin-tazobactam was stopped. Orthopedic Surgery performed hip washout and debridement with proximal femoral resection with placement of antibiotic cement spacer, removal of cephalomedullary nail, incision and drainage with irrigation of deep abscess, excisional debridement of fascia and muscle. Drain and wound vac were placed. She developed painful vesicular lesions on her tongue after starting trimethoprim-sulfamethoxazole. She was given viscous lidocaine and empiric valganciclovir. HSV PCR was indeterminate. HSV DNA was detected but could not differentiate between 1 and 2. Infectious Disease was consulted and recommended switching to daptomycin at discharge and giving 8 mg/kg daily until 2/29/2024. Repeat blood culture had no growth. PICC was placed. Lumbar spine noted no infection. Physical and Occupational Therapy were consulted. She declined skilled nursing facility placement. Orthopedic Surgery performed another incision and debridement on 1/25/2024 due to worsening signs of infection, but no purulence was found and Orthopedic Surgery suspected another source of infection. She had no new symptoms and felt well enough to go home. Chest X-ray showed no pneumonia. Urinalysis showed no urine infection. Leukocytosis resolved on 1/27/2024.    Interval History: Waiting on removal of drain before going home.    Review of Systems   Constitutional:  Negative for chills and fever.   Musculoskeletal:  Positive for arthralgias and gait problem.     Objective:     Vital Signs (Most Recent):  Temp:  97.7 °F (36.5 °C) (01/28/24 0732)  Pulse: 77 (01/28/24 0851)  Resp: 18 (01/28/24 0732)  BP: 139/75 (01/28/24 0732)  SpO2: 95 % (01/28/24 0414) Vital Signs (24h Range):  Temp:  [97.5 °F (36.4 °C)-98.4 °F (36.9 °C)] 97.7 °F (36.5 °C)  Pulse:  [61-85] 77  Resp:  [18-20] 18  SpO2:  [95 %-97 %] 95 %  BP: (110-146)/(61-75) 139/75     Weight: 63.9 kg (140 lb 14 oz)  Body mass index is 22.74 kg/m².    Intake/Output Summary (Last 24 hours) at 1/28/2024 0855  Last data filed at 1/28/2024 0530  Gross per 24 hour   Intake 240 ml   Output 2900 ml   Net -2660 ml           Physical Exam  Vitals and nursing note reviewed.   Constitutional:       General: She is not in acute distress.     Appearance: She is well-developed and normal weight. She is not diaphoretic.   Musculoskeletal:      Comments: Left hip dressed with drain   Neurological:      Mental Status: She is alert and oriented to person, place, and time. Mental status is at baseline.      Motor: No seizure activity.   Psychiatric:         Attention and Perception: Attention normal.         Mood and Affect: Affect normal.         Behavior: Behavior is not aggressive. Behavior is cooperative.             Significant Labs: All pertinent labs within the past 24 hours have been reviewed.    Significant Imaging: I have reviewed all pertinent imaging results/findings within the past 24 hours.    Assessment/Plan:      * Sepsis due to methicillin resistant Staphylococcus aureus (MRSA)  Orthopedic Surgery following. S/p surgeries. Getting vancomycin. Infectious Disease recommended daptomycin at discharge to be given until 2/29/2024. PICC placed. Can go home when drain removed.    Oral lesion  Giving empiric valacyclovir and viscous lidocaine. -    MRSA bacteremia  See primary problem.    Staphylococcal arthritis of left hip  See primary problem.    Painful orthopaedic hardware  See primary problem    (HFpEF) heart failure with preserved ejection fraction  Control hypertension.  Monitor intake/output.    Depression  Continue home escitalopram.        Chronic atrial fibrillation with RVR  Continue home amiodarone, metoprolol, apixaban.      Iron deficiency anemia  Chronic, gets iron infusions.    Peripheral neuropathy  Continue home pregabalin.    Essential hypertension  She takes amlodipine-benazepril, metoprolol tartrate. Giving hospital formulary alternative of home antihypertensives. Monitor blood pressures.    Chronic pain with uncomplicated opioid dependence  She takes oxycodone-acetaminophen prn. Giving oxycodone prn with hydromorphone for breakthrough pain in the setting of acute pain.        VTE Risk Mitigation (From admission, onward)           Ordered     apixaban tablet 5 mg  2 times daily         01/19/24 1446     Reason for No Pharmacological VTE Prophylaxis  Once        Question:  Reasons:  Answer:  Already adequately anticoagulated on oral Anticoagulants    01/17/24 2037     IP VTE HIGH RISK PATIENT  Once         01/17/24 2037     Place sequential compression device  Until discontinued         01/17/24 2037                    Discharge Planning   BAILEY: 1/28/2024     Code Status: Full Code   Is the patient medically ready for discharge?: No    Reason for patient still in hospital (select all that apply): Other (specify) drain  Discharge Plan A: Home Health   Discharge Delays: None known at this time              Jerry Jensen MD  Department of Hospital Medicine   [facility] - Telemetry Stepdown

## 2024-01-28 NOTE — ASSESSMENT & PLAN NOTE
Froilan Ray is a 78 y.o. female w/PMH of HTN, HLD, CHF, chronic pain on opioids, peripheral neuropathy, L-intertroch fx s/p TFNA (12/11/2018, Dr. Eulalio De La Cruz), who presents as transfer from OSH with large left hip fluid collection and XR showing femoral head collapse with cut-through of the TFNA helical blade. Blood cx + for MRSA, on vanc/ceftaroline, ID consulted. Pt underwent IR aspiration of L hip on 1/18 with 72cc of juan pus aspirated, gram stain + for gram positive cocci in clusters.    » s/p nail removal and abx spacer 1/19  Now s/p repeat I&D of left hip on 1/25      Pt with WBC down to 11 today. , down from 284  Drain output 50cc/24h, may pull tomorrow, will trend output      Pain control: MM  PT/OT:  TTWB LLE  DVT PPx: eliquis 5 bid for a fib, SCDs at all times when not ambulating  Abx:  vanc, likely 6 weeks vanc with chronic suppression thereafter per ID.   Cx: hip aspiration cx growing MRSA       » Dispo: Pending clinical improvement and drain removal; Trend CRP Q48h

## 2024-01-28 NOTE — PROGRESS NOTES
Pharmacokinetic Assessment Follow Up: IV Vancomycin    Vancomycin serum concentration assessment(s):    The random level was drawn correctly and can be used to guide therapy at this time. The measurement is within the desired definitive target range of 15 to 20 mcg/mL.    Vancomycin Regimen Plan:    Re-dose when the random level is less than 20 mcg/mL, next level to be drawn with AM labs  on 1/29    Drug levels (last 3 results):  Recent Labs   Lab Result Units 01/25/24  2359 01/27/24 0457 01/28/24  0624   Vancomycin, Random ug/mL  --  28.0 17.9   Vancomycin-Trough ug/mL 26.2*  --   --        Pharmacy will continue to follow and monitor vancomycin.    Please contact pharmacy at extension [phone] for questions regarding this assessment.    Thank you for the consult,   Ainsley Sotomayor       Patient brief summary:  Froilan Ray is a 78 y.o. female initiated on antimicrobial therapy with IV Vancomycin for treatment of bone/joint infection - septic arthritis (MRSA)     The patient's current regimen is pulse dosing     Drug Allergies:   Review of patient's allergies indicates:  No Known Allergies    Actual Body Weight:   63.9 kg    Renal Function:   Estimated Creatinine Clearance: 64 mL/min (based on SCr of 0.7 mg/dL).,     Dialysis Method (if applicable):  N/A    CBC (last 72 hours):  Recent Labs   Lab Result Units 01/26/24 0447 01/27/24 0457 01/28/24 0624   WBC K/uL 15.46* 10.24 11.71   Hemoglobin g/dL 7.9* 7.8* 8.2*   Hematocrit % 23.4* 25.0* 26.4*   Platelets K/uL 432 445 458*   Gran % % 82.4* 77.7* 81.4*   Lymph % % 9.9* 12.7* 9.6*   Mono % % 5.6 6.8 6.0   Eosinophil % % 0.0 0.1 0.1   Basophil % % 0.2 0.4 0.4   Differential Method  Automated Automated Automated       Metabolic Panel (last 72 hours):  Recent Labs   Lab Result Units 01/26/24  0447 01/26/24  1115 01/27/24 0457 01/28/24  0624   Sodium mmol/L 131*  --  131* 130*   Potassium mmol/L 4.6  --  4.1 4.2   Chloride mmol/L 102  --  104 103   CO2 mmol/L 19*   --  19* 21*   Glucose mg/dL 94  --  70 83   Glucose, UA   --  Negative  --   --    BUN mg/dL 30*  --  26* 18   Creatinine mg/dL 1.0  --  0.8 0.7   Albumin g/dL 1.3*  --  1.5* 1.3*   Total Bilirubin mg/dL 0.4  --  0.3 0.4   Alkaline Phosphatase U/L 168*  --  136* 129   AST U/L 50*  --  69* 31   ALT U/L 29  --  33 25       Vancomycin Administrations:  vancomycin given in the last 96 hours                     vancomycin 1,250 mg in dextrose 5 % (D5W) 250 mL IVPB (Vial-Mate) (mg) 1,250 mg New Bag 01/26/24 0616     1,250 mg New Bag 01/25/24 0440    vancomycin injection (g) 1 g Given 01/25/24 1106                    Microbiologic Results:  Microbiology Results (last 7 days)       Procedure Component Value Units Date/Time    Blood culture [[id number]] Collected: 01/25/24 1656    Order Status: Completed Specimen: Blood from Peripheral, Left Hand Updated: 01/27/24 2012     Blood Culture, Routine No Growth to date      No Growth to date      No Growth to date    Blood culture [[id number]] Collected: 01/25/24 1709    Order Status: Completed Specimen: Blood from Peripheral, Left Arm Updated: 01/27/24 2012     Blood Culture, Routine No Growth to date      No Growth to date      No Growth to date    Aerobic culture [[id number]] Collected: 01/25/24 1113    Order Status: Completed Specimen: Wound from Hip, Left Updated: 01/27/24 1033     Aerobic Bacterial Culture No growth    Narrative:      1) Left hip tissue    Aerobic culture [[id number]] Collected: 01/25/24 1114    Order Status: Completed Specimen: Wound from Hip, Left Updated: 01/27/24 1033     Aerobic Bacterial Culture No growth    Narrative:      2) left hip tissue    Aerobic culture [[id number]] Collected: 01/25/24 1114    Order Status: Completed Specimen: Wound from Hip, Left Updated: 01/27/24 1033     Aerobic Bacterial Culture No growth    Narrative:      3) left hip tissue    Culture, Anaerobe [[id number]] Collected: 01/25/24 1113    Order Status: Completed Specimen:  Wound from Hip, Left Updated: 01/26/24 1017     Anaerobic Culture Culture in progress    Narrative:      1) left hip tissue    Culture, Anaerobe [[id number]] Collected: 01/25/24 1114    Order Status: Completed Specimen: Wound from Hip, Left Updated: 01/26/24 1017     Anaerobic Culture Culture in progress    Narrative:      2) left hip tissue    Culture, Anaerobe [[id number]] Collected: 01/25/24 1114    Order Status: Completed Specimen: Wound from Hip, Left Updated: 01/26/24 1017     Anaerobic Culture Culture in progress    Narrative:      3) left hip tissue    Blood culture [[id number]] Collected: 01/18/24 1655    Order Status: Completed Specimen: Blood Updated: 01/23/24 2012     Blood Culture, Routine No growth after 5 days.    Blood culture [[id number]] Collected: 01/18/24 1704    Order Status: Completed Specimen: Blood Updated: 01/23/24 2012     Blood Culture, Routine No growth after 5 days.    Fungus culture [[id number]] Collected: 01/18/24 1006    Order Status: Completed Specimen: Joint Fluid from Hip, Left Updated: 01/23/24 1249     Fungus (Mycology) Culture Culture in progress    Narrative:      LEFT Hip JOINT FLUID    Fungus culture [[id number]] Collected: 01/18/24 1013    Order Status: Completed Specimen: Hip, Left Updated: 01/23/24 1249     Fungus (Mycology) Culture Culture in progress    Narrative:      Left hip joint aspiration    Culture, Anaerobe [[id number]] Collected: 01/18/24 1013    Order Status: Completed Specimen: Hip, Left Updated: 01/22/24 0958     Anaerobic Culture No anaerobes isolated    Narrative:      Left hip joint aspiration    Culture, Anaerobic [[id number]] Collected: 01/18/24 1006    Order Status: Completed Specimen: Joint Fluid from Hip, Left Updated: 01/22/24 0955     Anaerobic Culture No anaerobes isolated    Narrative:      LEFT Hip JOINT FLUID

## 2024-01-28 NOTE — SUBJECTIVE & OBJECTIVE
Interval History: Waiting on removal of drain before going home.    Review of Systems   Constitutional:  Negative for chills and fever.   Musculoskeletal:  Positive for arthralgias and gait problem.     Objective:     Vital Signs (Most Recent):  Temp: 97.7 °F (36.5 °C) (01/28/24 0732)  Pulse: 77 (01/28/24 0851)  Resp: 18 (01/28/24 0732)  BP: 139/75 (01/28/24 0732)  SpO2: 95 % (01/28/24 0414) Vital Signs (24h Range):  Temp:  [97.5 °F (36.4 °C)-98.4 °F (36.9 °C)] 97.7 °F (36.5 °C)  Pulse:  [61-85] 77  Resp:  [18-20] 18  SpO2:  [95 %-97 %] 95 %  BP: (110-146)/(61-75) 139/75     Weight: 63.9 kg (140 lb 14 oz)  Body mass index is 22.74 kg/m².    Intake/Output Summary (Last 24 hours) at 1/28/2024 0855  Last data filed at 1/28/2024 0530  Gross per 24 hour   Intake 240 ml   Output 2900 ml   Net -2660 ml           Physical Exam  Vitals and nursing note reviewed.   Constitutional:       General: She is not in acute distress.     Appearance: She is well-developed and normal weight. She is not diaphoretic.   Musculoskeletal:      Comments: Left hip dressed with drain   Neurological:      Mental Status: She is alert and oriented to person, place, and time. Mental status is at baseline.      Motor: No seizure activity.   Psychiatric:         Attention and Perception: Attention normal.         Mood and Affect: Affect normal.         Behavior: Behavior is not aggressive. Behavior is cooperative.             Significant Labs: All pertinent labs within the past 24 hours have been reviewed.    Significant Imaging: I have reviewed all pertinent imaging results/findings within the past 24 hours.

## 2024-01-29 VITALS
HEIGHT: 66 IN | TEMPERATURE: 98 F | HEART RATE: 64 BPM | RESPIRATION RATE: 18 BRPM | SYSTOLIC BLOOD PRESSURE: 108 MMHG | OXYGEN SATURATION: 95 % | BODY MASS INDEX: 22.64 KG/M2 | DIASTOLIC BLOOD PRESSURE: 53 MMHG | WEIGHT: 140.88 LBS

## 2024-01-29 PROBLEM — R33.9 URINARY RETENTION: Status: ACTIVE | Noted: 2024-01-29

## 2024-01-29 LAB
BACTERIA SPEC AEROBE CULT: NO GROWTH
BACTERIA SPEC ANAEROBE CULT: NORMAL
CRP SERPL-MCNC: 144.5 MG/L (ref 0–8.2)
VANCOMYCIN SERPL-MCNC: 11.4 UG/ML

## 2024-01-29 PROCEDURE — 25000003 PHARM REV CODE 250: Performed by: HOSPITALIST

## 2024-01-29 PROCEDURE — 25000003 PHARM REV CODE 250: Performed by: STUDENT IN AN ORGANIZED HEALTH CARE EDUCATION/TRAINING PROGRAM

## 2024-01-29 PROCEDURE — 63600175 PHARM REV CODE 636 W HCPCS: Performed by: STUDENT IN AN ORGANIZED HEALTH CARE EDUCATION/TRAINING PROGRAM

## 2024-01-29 PROCEDURE — A4216 STERILE WATER/SALINE, 10 ML: HCPCS | Performed by: STUDENT IN AN ORGANIZED HEALTH CARE EDUCATION/TRAINING PROGRAM

## 2024-01-29 PROCEDURE — 0T9B70Z DRAINAGE OF BLADDER WITH DRAINAGE DEVICE, VIA NATURAL OR ARTIFICIAL OPENING: ICD-10-PCS | Performed by: HOSPITALIST

## 2024-01-29 PROCEDURE — 86140 C-REACTIVE PROTEIN: CPT

## 2024-01-29 PROCEDURE — 63600175 PHARM REV CODE 636 W HCPCS: Performed by: HOSPITALIST

## 2024-01-29 PROCEDURE — 94761 N-INVAS EAR/PLS OXIMETRY MLT: CPT

## 2024-01-29 PROCEDURE — 80202 ASSAY OF VANCOMYCIN: CPT | Performed by: HOSPITALIST

## 2024-01-29 PROCEDURE — 25000003 PHARM REV CODE 250: Performed by: ORTHOPAEDIC SURGERY

## 2024-01-29 RX ADMIN — OXYCODONE HYDROCHLORIDE 10 MG: 10 TABLET ORAL at 05:01

## 2024-01-29 RX ADMIN — HYDROMORPHONE HYDROCHLORIDE 1 MG: 1 INJECTION, SOLUTION INTRAMUSCULAR; INTRAVENOUS; SUBCUTANEOUS at 04:01

## 2024-01-29 RX ADMIN — LIDOCAINE HYDROCHLORIDE 5 ML: 20 SOLUTION ORAL; TOPICAL at 01:01

## 2024-01-29 RX ADMIN — VALACYCLOVIR HYDROCHLORIDE 1000 MG: 500 TABLET, FILM COATED ORAL at 09:01

## 2024-01-29 RX ADMIN — HYDROMORPHONE HYDROCHLORIDE 1 MG: 1 INJECTION, SOLUTION INTRAMUSCULAR; INTRAVENOUS; SUBCUTANEOUS at 05:01

## 2024-01-29 RX ADMIN — VANCOMYCIN HYDROCHLORIDE 1000 MG: 1 INJECTION, POWDER, LYOPHILIZED, FOR SOLUTION INTRAVENOUS at 01:01

## 2024-01-29 RX ADMIN — NYSTATIN 500000 UNITS: 100000 SUSPENSION ORAL at 09:01

## 2024-01-29 RX ADMIN — Medication 10 ML: at 05:01

## 2024-01-29 RX ADMIN — LIDOCAINE HYDROCHLORIDE 5 ML: 20 SOLUTION ORAL; TOPICAL at 05:01

## 2024-01-29 RX ADMIN — AMIODARONE HYDROCHLORIDE 200 MG: 200 TABLET ORAL at 09:01

## 2024-01-29 RX ADMIN — ACETAMINOPHEN 1000 MG: 500 TABLET ORAL at 09:01

## 2024-01-29 RX ADMIN — OXYCODONE HYDROCHLORIDE 10 MG: 10 TABLET ORAL at 11:01

## 2024-01-29 RX ADMIN — METOPROLOL TARTRATE 50 MG: 50 TABLET, FILM COATED ORAL at 09:01

## 2024-01-29 RX ADMIN — METHOCARBAMOL 500 MG: 500 TABLET ORAL at 09:01

## 2024-01-29 RX ADMIN — PREGABALIN 200 MG: 100 CAPSULE ORAL at 09:01

## 2024-01-29 RX ADMIN — DAPTOMYCIN 510 MG: 350 INJECTION, POWDER, LYOPHILIZED, FOR SOLUTION INTRAVENOUS at 05:01

## 2024-01-29 RX ADMIN — HYDROMORPHONE HYDROCHLORIDE 1 MG: 1 INJECTION, SOLUTION INTRAMUSCULAR; INTRAVENOUS; SUBCUTANEOUS at 12:01

## 2024-01-29 RX ADMIN — HYDROMORPHONE HYDROCHLORIDE 1 MG: 1 INJECTION, SOLUTION INTRAMUSCULAR; INTRAVENOUS; SUBCUTANEOUS at 02:01

## 2024-01-29 RX ADMIN — APIXABAN 5 MG: 5 TABLET, FILM COATED ORAL at 09:01

## 2024-01-29 RX ADMIN — Medication 10 ML: at 12:01

## 2024-01-29 RX ADMIN — AMLODIPINE BESYLATE 5 MG: 5 TABLET ORAL at 09:01

## 2024-01-29 RX ADMIN — NYSTATIN 500000 UNITS: 100000 SUSPENSION ORAL at 05:01

## 2024-01-29 RX ADMIN — LIDOCAINE HYDROCHLORIDE 5 ML: 20 SOLUTION ORAL; TOPICAL at 10:01

## 2024-01-29 RX ADMIN — PREGABALIN 200 MG: 100 CAPSULE ORAL at 04:01

## 2024-01-29 RX ADMIN — Medication 10 ML: at 11:01

## 2024-01-29 RX ADMIN — NYSTATIN 500000 UNITS: 100000 SUSPENSION ORAL at 01:01

## 2024-01-29 RX ADMIN — ACETAMINOPHEN 1000 MG: 500 TABLET ORAL at 04:01

## 2024-01-29 RX ADMIN — DOCUSATE SODIUM 100 MG: 100 CAPSULE, LIQUID FILLED ORAL at 09:01

## 2024-01-29 RX ADMIN — METHOCARBAMOL 500 MG: 500 TABLET ORAL at 04:01

## 2024-01-29 RX ADMIN — ESCITALOPRAM OXALATE 20 MG: 5 TABLET, FILM COATED ORAL at 09:01

## 2024-01-29 NOTE — SUBJECTIVE & OBJECTIVE
Principal Problem:Sepsis due to methicillin resistant Staphylococcus aureus (MRSA)    Principal Orthopedic Problem: same    Interval History: NAEON. VSS. Pain controlled. Drain output 10cc/24 hr, dced today. Plan to dc home today with HH IV abx. PT recommending SNF, but pt refused. Pt understands high risk of going home at this point with limited mobility.      Review of patient's allergies indicates:  No Known Allergies    Current Facility-Administered Medications   Medication    (Magic mouthwash) 1:1:1 diphenhydrAMINE(Benadryl) 12.5mg/5ml liq, aluminum & magnesium hydroxide-simethicone (Maalox), LIDOcaine viscous 2%    acetaminophen tablet 1,000 mg    aluminum-magnesium hydroxide-simethicone 200-200-20 mg/5 mL suspension 30 mL    amiodarone tablet 200 mg    amLODIPine tablet 5 mg    apixaban tablet 5 mg    docusate sodium capsule 100 mg    EScitalopram oxalate tablet 20 mg    guaiFENesin 100 mg/5 ml syrup 200 mg    oxyCODONE immediate release tablet 5 mg    And    oxyCODONE immediate release tablet Tab 10 mg    And    HYDROmorphone injection 1 mg    LIDOcaine viscous HCl 2% oral solution 5 mL    melatonin tablet 6 mg    methocarbamoL tablet 500 mg    metoprolol tartrate (LOPRESSOR) tablet 50 mg    naloxone 0.4 mg/mL injection 0.02 mg    nystatin 100,000 unit/mL suspension 500,000 Units    ondansetron disintegrating tablet 8 mg    polyethylene glycol packet 17 g    pregabalin capsule 200 mg    simethicone chewable tablet 80 mg    sodium chloride 0.9% flush 1-10 mL    sodium chloride 0.9% flush 10 mL    And    sodium chloride 0.9% flush 10 mL    sodium chloride 0.9% flush 10 mL    valACYclovir tablet 1,000 mg    vancomycin - pharmacy to dose     Objective:     Vital Signs (Most Recent):  Temp: 98.4 °F (36.9 °C) (01/29/24 1533)  Pulse: 64 (01/29/24 1533)  Resp: 18 (01/29/24 1533)  BP: (!) 108/53 (01/29/24 1533)  SpO2: 95 % (01/29/24 1533) Vital Signs (24h Range):  Temp:  [97.5 °F (36.4 °C)-99.3 °F (37.4 °C)] 98.4 °F  "(36.9 °C)  Pulse:  [58-69] 64  Resp:  [17-20] 18  SpO2:  [95 %-97 %] 95 %  BP: (108-138)/(53-72) 108/53     Weight: 63.9 kg (140 lb 14 oz)  Height: 5' 6" (167.6 cm)  Body mass index is 22.74 kg/m².      Intake/Output Summary (Last 24 hours) at 1/29/2024 1541  Last data filed at 1/29/2024 0708  Gross per 24 hour   Intake 0 ml   Output 1155 ml   Net -1155 ml        Ortho/SPM Exam     Awake/alert/oriented x3, No acute distress, Afebrile, Vital signs stable  Good inspiratory effort with unlaboured breathing  WV with good suction  NVI in operative limb         Significant Labs: All pertinent labs within the past 24 hours have been reviewed.    Significant Imaging: I have reviewed all pertinent imaging results/findings.  "

## 2024-01-29 NOTE — NURSING
Repeat bladder scan done at 3AM 1/29. 794ml residual volume noted. Notified DR. Ortega cath done 650ml urine removed. Patient feel comfortable. Patient attached with external catheter. Encouraged to void spontaneously.

## 2024-01-29 NOTE — PROGRESS NOTES
"[facility] - Telemetry [facility] Medicine  Progress Note    Patient Name: Froilan Ray  MRN: [id number]  Patient Class: IP- Inpatient   Admission Date: 1/17/2024  Length of Stay: 12 days  Attending Physician: Jerry Jensen MD  Primary Care Provider: Alphonse Boothe III, MD        Subjective:     Principal Problem:Sepsis due to methicillin resistant Staphylococcus aureus (MRSA)        HPI:  Froilan Ray is a 78 year old white woman with hypertension, chronic systolic congestive heart failure, atrial fibrillation (anticoagulated on apixaban), irritable bowel syndrome, diverticulosis, seizure disorder, iron deficiency anemia, cervical and lumbar generative disc disease, history of L2-S1 decompression ion 2/15/2006, history of cervical decompression, history of spinal cord stimulator implant on 9/18/2013 with subsequent removal, arthritis, peripheral neuropathy, history of epigastric hernia repair on 12/7/2023, history of hysterectomy on 10/2/2017, history of left intertrochanteric femur fracture status post open reduction internal fixation with intramedullary device on 12/11/2018, chronic pain on opioids. She lives in [place], [place]. She is . Her primary care physician is Dr. Alphonse Boothe III.               She presented to [facility] on 1/16/2024 for symptoms of septic joint that had been present for "a long time". She had been "unable to do anything", basically lying in bed most of the day due to inability to bear weight on her left leg due to pain. She has chronic pain, for which she takes "2 Percocets every 6 hours", which was not relieving her pain for the past 2 to 3 weeks. She also had dry mouth. She was tachycardic and labs showed leukocytosis. CT showed fluid around her left hip previous surgical cite. She was in atrial fibrillation with rapid ventricular response and was converted to sinus rhythm with diltiazem and amiodarone transfusions. She was given " antibiotics. She was transferred to [facility] on the night of 1/17/2024 for Orthopedic Surgery evaluation and admitted to Hospital Medicine Team S.     Overview/Hospital Course:  She underwent joint aspiration finding purulent fluid with culture growing Staphylococcus. Blood culture grew methicillin-resistant Staphylococcus aureus. She was put on piperacillin-tazobactam and vancomycin initially. Piperacillin-tazobactam was stopped. Orthopedic Surgery performed hip washout and debridement with proximal femoral resection with placement of antibiotic cement spacer, removal of cephalomedullary nail, incision and drainage with irrigation of deep abscess, excisional debridement of fascia and muscle. Drain and wound vac were placed. She developed painful vesicular lesions on her tongue after starting trimethoprim-sulfamethoxazole. She was given viscous lidocaine and empiric valganciclovir. HSV PCR was indeterminate. HSV DNA was detected but could not differentiate between 1 and 2. Infectious Disease was consulted and recommended switching to daptomycin at discharge and giving 8 mg/kg daily until 2/29/2024. Repeat blood culture had no growth. PICC was placed. Lumbar spine noted no infection. Physical and Occupational Therapy were consulted. She declined skilled nursing facility placement. Orthopedic Surgery performed another incision and debridement on 1/25/2024 due to worsening signs of infection, but no purulence was found and Orthopedic Surgery suspected another source of infection. She had no new symptoms and felt well enough to go home. Chest X-ray showed no pneumonia. Urinalysis showed no urine infection. Leukocytosis resolved on 1/27/2024. She had urinary retention with greater than 600 mL of urine retained on bladder scan each day and required in-and-out catheterization every day. However, she was not getting up to use the commode and trying to urinate while lying in bed.     Interval  History: Waiting on removal of drain before going home. Requiring straight cath for urinary retention every day. Will continue to have this problem at home so place Boyd catheter.    Review of Systems   Constitutional:  Negative for chills and fever.   Musculoskeletal:  Positive for arthralgias and gait problem.     Objective:     Vital Signs (Most Recent):  Temp: 99.3 °F (37.4 °C) (01/29/24 0738)  Pulse: 67 (01/29/24 0738)  Resp: 20 (01/29/24 0553)  BP: 137/63 (01/29/24 0738)  SpO2: 95 % (01/29/24 0738) Vital Signs (24h Range):  Temp:  [97.5 °F (36.4 °C)-99.3 °F (37.4 °C)] 99.3 °F (37.4 °C)  Pulse:  [58-73] 67  Resp:  [16-20] 20  SpO2:  [95 %-97 %] 95 %  BP: (112-138)/(62-74) 137/63     Weight: 63.9 kg (140 lb 14 oz)  Body mass index is 22.74 kg/m².    Intake/Output Summary (Last 24 hours) at 1/29/2024 0858  Last data filed at 1/29/2024 0708  Gross per 24 hour   Intake 0 ml   Output 1155 ml   Net -1155 ml           Physical Exam  Vitals and nursing note reviewed.   Constitutional:       General: She is not in acute distress.     Appearance: She is well-developed and normal weight. She is not diaphoretic.   Musculoskeletal:      Comments: Left hip dressed with drain   Neurological:      Mental Status: She is alert and oriented to person, place, and time. Mental status is at baseline.      Motor: No seizure activity.   Psychiatric:         Attention and Perception: Attention normal.         Mood and Affect: Affect normal.         Behavior: Behavior is not aggressive. Behavior is cooperative.             Significant Labs: All pertinent labs within the past 24 hours have been reviewed.    Significant Imaging: I have reviewed all pertinent imaging results/findings within the past 24 hours.    Assessment/Plan:      * Sepsis due to methicillin resistant Staphylococcus aureus (MRSA)  Orthopedic Surgery following. S/p surgeries. Getting vancomycin. Infectious Disease recommended daptomycin at discharge to be given until  2/29/2024. PICC placed. Can go home when drain removed.    Urinary retention  Probably due to trying to urinate while lying down, not getting up to use the commode. Place Boyd catheter. Follow up outpatient for voiding trial when more mobile.       Oral lesion  Giving empiric valacyclovir and viscous lidocaine. -    MRSA bacteremia  See primary problem.    Staphylococcal arthritis of left hip  See primary problem.    Painful orthopaedic hardware  See primary problem    (HFpEF) heart failure with preserved ejection fraction  Control hypertension. Monitor intake/output.    Depression  Continue home escitalopram.        Chronic atrial fibrillation with RVR  Continue home amiodarone, metoprolol, apixaban.      Iron deficiency anemia  Chronic, gets iron infusions.    Peripheral neuropathy  Continue home pregabalin.    Essential hypertension  She takes amlodipine-benazepril, metoprolol tartrate. Giving hospital formulary alternative of home antihypertensives. Monitor blood pressures.    Chronic pain with uncomplicated opioid dependence  She takes oxycodone-acetaminophen prn. Giving oxycodone prn with hydromorphone for breakthrough pain in the setting of acute pain.        VTE Risk Mitigation (From admission, onward)           Ordered     apixaban tablet 5 mg  2 times daily         01/19/24 1446     Reason for No Pharmacological VTE Prophylaxis  Once        Question:  Reasons:  Answer:  Already adequately anticoagulated on oral Anticoagulants    01/17/24 2037     IP VTE HIGH RISK PATIENT  Once         01/17/24 2037     Place sequential compression device  Until discontinued         01/17/24 2037                    Discharge Planning   BAILEY: 1/30/2024     Code Status: Full Code   Is the patient medically ready for discharge?: No    Reason for patient still in hospital (select all that apply): Treatment and Consult recommendations  Discharge Plan A: Home Health   Discharge Delays: None known at this time              Jerry  SHERITA Jensen MD  Department of Hospital Medicine   [facility] - Telemetry Stepdown

## 2024-01-29 NOTE — ASSESSMENT & PLAN NOTE
Probably due to trying to urinate while lying down, not getting up to use the commode. Place Boyd catheter. Follow up outpatient for voiding trial when more mobile.

## 2024-01-29 NOTE — PROGRESS NOTES
Pharmacokinetic Assessment Follow Up: IV Vancomycin    Vancomycin serum concentration assessment(s):    The random level was drawn correctly and can be used to guide therapy at this time. The measurement is below the desired definitive target range of 15 to 20 mcg/mL.    Vancomycin Regimen Plan:  Vanc 1000mg x1 today  Obtain conc in the morning tomorrow  Goal: 15-20 mcg/mL    Drug levels (last 3 results):  Recent Labs   Lab Result Units 01/27/24  0457 01/28/24  0624 01/29/24  0707   Vancomycin, Random ug/mL 28.0 17.9 11.4       Pharmacy will continue to follow and monitor vancomycin.    Please contact pharmacy at extension [phone] for questions regarding this assessment.    Thank you for the consult,   Katherine Carrington       Patient brief summary:  Froilan Rya is a 78 y.o. female initiated on antimicrobial therapy with IV Vancomycin for treatment of bacteremia      Drug Allergies:   Review of patient's allergies indicates:  No Known Allergies    Actual Body Weight:   63.9 kg    Renal Function:   Estimated Creatinine Clearance: 64 mL/min (based on SCr of 0.7 mg/dL).,       CBC (last 72 hours):  Recent Labs   Lab Result Units 01/27/24 0457 01/28/24  0624   WBC K/uL 10.24 11.71   Hemoglobin g/dL 7.8* 8.2*   Hematocrit % 25.0* 26.4*   Platelets K/uL 445 458*   Gran % % 77.7* 81.4*   Lymph % % 12.7* 9.6*   Mono % % 6.8 6.0   Eosinophil % % 0.1 0.1   Basophil % % 0.4 0.4   Differential Method  Automated Automated       Metabolic Panel (last 72 hours):  Recent Labs   Lab Result Units 01/27/24 0457 01/28/24  0624   Sodium mmol/L 131* 130*   Potassium mmol/L 4.1 4.2   Chloride mmol/L 104 103   CO2 mmol/L 19* 21*   Glucose mg/dL 70 83   BUN mg/dL 26* 18   Creatinine mg/dL 0.8 0.7   Albumin g/dL 1.5* 1.3*   Total Bilirubin mg/dL 0.3 0.4   Alkaline Phosphatase U/L 136* 129   AST U/L 69* 31   ALT U/L 33 25       Vancomycin Administrations:  vancomycin given in the last 96 hours                     vancomycin (VANCOCIN) 500 mg in  dextrose 5 % in water (D5W) 100 mL IVPB (MB+) (mg) 500 mg New Bag 01/28/24 1235    vancomycin 1,250 mg in dextrose 5 % (D5W) 250 mL IVPB (Vial-Mate) (mg) 1,250 mg New Bag 01/26/24 0616                    Microbiologic Results:  Microbiology Results (last 7 days)       Procedure Component Value Units Date/Time    Aerobic culture [[id number]] Collected: 01/25/24 1113    Order Status: Completed Specimen: Wound from Hip, Left Updated: 01/29/24 1039     Aerobic Bacterial Culture No growth    Narrative:      1) Left hip tissue    Aerobic culture [[id number]] Collected: 01/25/24 1114    Order Status: Completed Specimen: Wound from Hip, Left Updated: 01/29/24 1039     Aerobic Bacterial Culture No growth    Narrative:      3) left hip tissue    Aerobic culture [[id number]] Collected: 01/25/24 1114    Order Status: Completed Specimen: Wound from Hip, Left Updated: 01/29/24 1039     Aerobic Bacterial Culture No growth    Narrative:      2) left hip tissue    Culture, Anaerobe [[id number]] Collected: 01/25/24 1114    Order Status: Completed Specimen: Wound from Hip, Left Updated: 01/29/24 0830     Anaerobic Culture Culture in progress    Narrative:      3) left hip tissue    Culture, Anaerobe [[id number]] Collected: 01/25/24 1114    Order Status: Completed Specimen: Wound from Hip, Left Updated: 01/29/24 0829     Anaerobic Culture Culture in progress    Narrative:      2) left hip tissue    Culture, Anaerobe [[id number]] Collected: 01/25/24 1113    Order Status: Completed Specimen: Wound from Hip, Left Updated: 01/29/24 0828     Anaerobic Culture No anaerobes isolated    Narrative:      1) left hip tissue    Blood culture [[id number]] Collected: 01/25/24 1709    Order Status: Completed Specimen: Blood from Peripheral, Left Arm Updated: 01/28/24 2012     Blood Culture, Routine No Growth to date      No Growth to date      No Growth to date      No Growth to date    Blood culture [[id number]] Collected: 01/25/24 1656    Order  Status: Completed Specimen: Blood from Peripheral, Left Hand Updated: 01/28/24 2012     Blood Culture, Routine No Growth to date      No Growth to date      No Growth to date      No Growth to date    Blood culture [[id number]] Collected: 01/18/24 1655    Order Status: Completed Specimen: Blood Updated: 01/23/24 2012     Blood Culture, Routine No growth after 5 days.    Blood culture [[id number]] Collected: 01/18/24 1704    Order Status: Completed Specimen: Blood Updated: 01/23/24 2012     Blood Culture, Routine No growth after 5 days.    Fungus culture [[id number]] Collected: 01/18/24 1006    Order Status: Completed Specimen: Joint Fluid from Hip, Left Updated: 01/23/24 1249     Fungus (Mycology) Culture Culture in progress    Narrative:      LEFT Hip JOINT FLUID    Fungus culture [[id number]] Collected: 01/18/24 1013    Order Status: Completed Specimen: Hip, Left Updated: 01/23/24 1249     Fungus (Mycology) Culture Culture in progress    Narrative:      Left hip joint aspiration

## 2024-01-29 NOTE — DISCHARGE SUMMARY
"[facility] - Telemetry [facility] Medicine  Discharge Summary      Patient Name: Froilan Ray  MRN: [id number]  IVAN: [id number]  Patient Class: IP- Inpatient  Admission Date: 1/17/2024  Hospital Length of Stay: 12 days  Discharge Date and Time: 1/29/2024  6:32 PM  Attending Physician: Jerry Jensen MD   Discharging Provider: Jerry Jensen MD  Primary Care Provider: Alphonse Boothe III, MD  Hospital Medicine Team: [facility] MED S Jerry Jensen MD  Primary Care Team: [facility] MED S    HPI:   Froilan Ray is a 78 year old white woman with hypertension, chronic systolic congestive heart failure, atrial fibrillation (anticoagulated on apixaban), irritable bowel syndrome, diverticulosis, seizure disorder, iron deficiency anemia, cervical and lumbar generative disc disease, history of L2-S1 decompression ion 2/15/2006, history of cervical decompression, history of spinal cord stimulator implant on 9/18/2013 with subsequent removal, arthritis, peripheral neuropathy, history of epigastric hernia repair on 12/7/2023, history of hysterectomy on 10/2/2017, history of left intertrochanteric femur fracture status post open reduction internal fixation with intramedullary device on 12/11/2018, chronic pain on opioids. She lives in [place], [place]. She is . Her primary care physician is Dr. Alphonse Boothe III.               She presented to [facility] on 1/16/2024 for symptoms of septic joint that had been present for "a long time". She had been "unable to do anything", basically lying in bed most of the day due to inability to bear weight on her left leg due to pain. She has chronic pain, for which she takes "2 Percocets every 6 hours", which was not relieving her pain for the past 2 to 3 weeks. She also had dry mouth. She was tachycardic and labs showed leukocytosis. CT showed fluid around her left hip previous surgical cite. She was in atrial fibrillation with rapid " ventricular response and was converted to sinus rhythm with diltiazem and amiodarone transfusions. She was given antibiotics. She was transferred to [facility] on the night of 1/17/2024 for Orthopedic Surgery evaluation and admitted to Hospital Medicine Team S.     Procedure(s) (LRB):  Irrigation and debridement left hip, (Left)      Hospital Course:   She underwent joint aspiration finding purulent fluid with culture growing Staphylococcus. Blood culture grew methicillin-resistant Staphylococcus aureus. She was put on piperacillin-tazobactam and vancomycin initially. Piperacillin-tazobactam was stopped. Orthopedic Surgery performed hip washout and debridement with proximal femoral resection with placement of antibiotic cement spacer, removal of cephalomedullary nail, incision and drainage with irrigation of deep abscess, excisional debridement of fascia and muscle. Drain and wound vac were placed. She developed painful vesicular lesions on her tongue after starting trimethoprim-sulfamethoxazole. She was given viscous lidocaine and empiric valganciclovir. HSV PCR was indeterminate. HSV DNA was detected but could not differentiate between 1 and 2. Infectious Disease was consulted and recommended switching to daptomycin at discharge and giving 8 mg/kg daily until 2/29/2024. Repeat blood culture had no growth. PICC was placed. Lumbar spine noted no infection. Physical and Occupational Therapy were consulted. She declined skilled nursing facility placement. Orthopedic Surgery performed another incision and debridement on 1/25/2024 due to worsening signs of infection, but no purulence was found and Orthopedic Surgery suspected another source of infection. She had no new symptoms and felt well enough to go home. Chest X-ray showed no pneumonia. Urinalysis showed no urine infection. Leukocytosis resolved on 1/27/2024. She had urinary retention with greater than 600 mL of urine retained on bladder  scan each day and required in-and-out catheterization every day. However, she was not getting up to use the commode and trying to urinate while lying in bed. Boyd catheter was placed and she was referred to Urology for outpatient voiding trial when she becomes more mobile. Orthopedic Surgery removed her drain on 1/29/2024 and said she was okay to discharge from their perspective. She was discharged home with home health.      Goals of Care Treatment Preferences:  Code Status: Full Code      Consults:   Consults (From admission, onward)          Status Ordering Provider     [facility]  Once        Provider:  (Not yet assigned)    Completed OTF BARRON     Inpatient consult to Infectious Diseases  Once        Provider:  (Not yet assigned)    Completed ROSA MCDOWELL     Inpatient consult to Interventional Radiology  Once        Provider:  (Not yet assigned)    Completed OTF BARRON     Pharmacy to dose Vancomycin consult  Once        Provider:  (Not yet assigned)   See [facility] Report.    Acknowledged JHONATAN MODI     Inpatient consult to Orthopedics  Once        Provider:  (Not yet assigned)    Completed JHONATAN MODI          Final Active Diagnoses:    Diagnosis Date Noted POA    PRINCIPAL PROBLEM:  Sepsis due to methicillin resistant Staphylococcus aureus (MRSA) [A41.02] 06/15/2022 Yes    Urinary retention [R33.9] 01/29/2024 No    Painful orthopaedic hardware [T84.84XA] 01/18/2024 Yes    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] 01/18/2024 Yes    MRSA bacteremia [R78.81, B95.62] 01/18/2024 Yes    Oral lesion [K13.70] 01/18/2024 Yes    (HFpEF) heart failure with preserved ejection fraction [I50.30] 01/16/2024 Yes     Chronic    Depression [F32.A] 06/30/2022 Yes     Chronic    Chronic atrial fibrillation with RVR [I48.20] 06/16/2022 Yes     Chronic    Iron deficiency anemia [D50.9] 05/18/2022 Yes     Chronic    Essential  hypertension [I10] 12/11/2018 Yes     Chronic    Peripheral neuropathy [G62.9] 12/11/2018 Yes     Chronic    Chronic pain with uncomplicated opioid dependence [F11.20] 08/27/2013 Yes     Chronic      Problems Resolved During this Admission:       Discharged Condition: good    Disposition: Home-Health Care [facility]    Follow Up:   Follow-up Information       Sredehar Carlos NP Follow up on 2/8/2024.    Specialty: Orthopedic Surgery  Why: 1:30 pm  Contact information:  [address]  [facility] [id number]  [phone]               Haroldo Morfin MD Follow up on 2/8/2024.    Specialty: Infectious Diseases  Why: 9 am  Contact information:  [address]  [facility] [address]  [phone]               [facility] - Urology [facility] 4[address] Fl Follow up.    Specialty: Urology  Why: voiding trial when more mobile  Contact information:  [address]  [facility] [id number] [phone]  Additional information:  Main Building, 4th Floor   Please park in [facility] and take At[facility] elevator                         Patient Instructions:      Ambulatory referral/consult to Urology   Standing Status: Future   Referral Priority: Routine Referral Type: Consultation   Referral Reason: Specialty Services Required   Requested Specialty: Urology   Number of Visits Requested: 1     Diet Adult Regular     Other restrictions (specify):   Order Comments: Do not submerge PICC in water     Notify your health care provider if you experience any of the following:  redness, tenderness, or signs of infection (pain, swelling, redness, odor or green/yellow discharge around incision site)     Notify your health care provider if you experience any of the following:  persistent nausea and vomiting or diarrhea     Notify your health care provider if you experience any of the following:  worsening rash     Notify your health care provider if you experience any of the following:  increased confusion or weakness     Notify your  health care provider if you experience any of the following:  persistent dizziness, light-headedness, or visual disturbances     Leave dressing on - Keep it clean, dry, and intact until clinic visit   Order Comments: Wound vac 125 mmHg continuous     Notify your health care provider if you experience any of the following:  persistent nausea and vomiting or diarrhea     Notify your health care provider if you experience any of the following:  redness, tenderness, or signs of infection (pain, swelling, redness, odor or green/yellow discharge around incision site)     Weight bearing restrictions (specify):   Order Comments: Toe touch weight bearing on left leg, full weight bearing on right leg       Significant Diagnostic Studies:   IR Aspiration Injection Large Joint W FL 1/18/24: Impression:  Percutaneous aspiration of left hip joint, yielding 10 mL of purulent fluid. No drainage catheter was left in place.   Percutaneous aspiration of left hip soft tissue collection, yielding 65 mL of purulent fluid. No drainage catheter was left in place.   Plan:   Send specimens to lab for analysis and culture   X-Ray Hip 2 or 3 views Left (with Pelvis when performed) 1/19/24: FINDINGS:   Three views left hip.   Since the previous exam, there has been placement of left femoral arthroplasty and antibiotic bead placement.  Left femoroacetabular joint is intact.  There is osteopenia.  There are degenerative changes of the spine.  Please see op note.   X-Ray Chest 1 View S/P PICC Line by Nursing 1/22/24: FINDINGS:   Right PICC is now present with tip near the expected junction of the brachiocephalic veins.  The heart size is normal.  Lungs are expanded and clear without acute consolidation.  Skeletal structures show DJD at right shoulder.   X-Ray Abdomen AP 1 View 1/23/24: FINDINGS:   Single abdominal radiograph is submitted, the entirety of the upper abdomen to include the hemidiaphragms, as well as the peripheral aspect of the right  abdomen not included on the field of view.  Postoperative left hip replacement is noted, with associated hardware, multiple small rounded densities are so shaded with the right hemipelvis and hip that may relate to postoperative change for which clinical correlation is needed.  There are overlying skin staples noted.  Monitoring lead overlies the right upper abdomen.  There is no additional radiographically detectable radiopaque metallic appearing foreign body.   Small focal calcifications are noted.  The osseous structures demonstrate chronic change.  There is mild to moderate air distention of the right colon and visualized transverse colon.   X-Ray Skull Ltd Less Than 4 Views 1/23/24: FINDINGS:   Positioning is less than optimal.  On the frontal view there is a small density projected over the mandible, this is likely external to the patient.  There is no additional radiographic evidence for radiographically detectable metallic radiopaque foreign body.  The visualized osseous structures appear intact, chronic changes are noted.  There is no evidence for acute fracture deformity.   MRI Lumbar Spine W WO Cont 1/23/24: FINDINGS:   Additional comment: For the purposes of this report, the L5-S1 level is considered axial image 34.  Confirmation of level numbering prior to any spine intervention would be recommended.   Postoperative/posttreatment findings: Status post laminectomy spanning L2-L5.  Susceptibility-related signal loss in the posterior paravertebral soft tissues at the operative levels may reflect micrometallic debris.  No large postoperative drainable fluid collection or abscess is appreciated.   Alignment: Scoliotic curvature of the spine, grossly similar to 01/16/2024 abdomen and pelvis CT and 11/19/2023 lumbar spine CT.  As before, there is apex convex levocurvature centered at approximately L2.  Left lateral listhesis of L3 on L4 is again appreciated.   Additionally, there is similar focal kyphosis  centered at the L3 level related to chronic compression deformity of L3.  Persistent retropulsion of the L3 vertebral body posterior endplate by approximately 3-4 mm into the spinal canal, similar to 11/19/2023.   Developmental spinal canal narrowing: None.   Discs: Degenerative disc disease, discussed further below.   Vertebrae: Chronic deformity of the L3 vertebra and scalloping of the L4 vertebra superior endplate, similar compared to most recent CT of 11/19/2023 and 01/16/2024.  Areas of ill-defined edema-like signal within the endplates spanning the L3-4 disc favored to represent active endplate degeneration and/or sequela of altered biomechanics.   Spinal cord: The distal cord has normal signal.The conus terminates at approximately L1.   Level-wise findings are as follows:   T12-L1: Shallow diffuse disc bulge and rightward eccentric facet arthropathy with ligamentum flavum thickening.  Mild central spinal canal stenosis.No significant foraminal narrowing.   L1-L2: Shallow diffuse disc bulge and mild facet arthropathy with ligamentum flavum thickening.  No significant central spinal canal stenosis.  Mild left and moderate right foraminal narrowing.   L2-L3: Minor posterior osteophyte formation shallow disc bulging.  No significant central spinal canal stenosis.  Minimal bilateral foraminal narrowing.   L3-L4: Mild posterior osteophyte formation, shallow disc bulge, and facet arthropathy with ligamentum flavum thickening.  Mild central spinal canal stenosis and moderate bilateral foraminal narrowing.   L4-L5: Minimal posterior osteophyte formation.  Degenerative facet arthropathy with ligamentum flavum thickening.  No significant central spinal canal stenosis.  No significant right foraminal narrowing.  Mild-to-moderate left foraminal narrowing.   L5-S1: Shallow diffuse disc bulge, eccentric towards the left and facet arthropathy with ligamentum flavum thickening.  No significant central spinal canal stenosis.   Moderate left foraminal narrowing.  No significant right foraminal narrowing.   Imaged sacroiliac joints: Degenerative changes.   Imaged abdomen, pelvis, and retroperitoneum: No abdominal, pelvic, or retroperitoneal abnormalities are detected on limited imaging.   Impression:  1. Postoperative and degenerative findings in the lumbar spine, as discussed.   2. Chronic height loss of the L3 and L4 vertebra, similar configuration to prior CT imaging of 01/16/2024 and 11/19/2023.   3. No definite MR findings to suggest acute infectious or inflammatory process.   4. Additional details, as provided in the body of report.  X-Ray Chest AP Portable 1/26/24: FINDINGS:   The tip of the right PICC line is retracted in its position compared to earlier exam, now superimposing the right medial clavicle and likely at the right SVC-caval junction.  Clinical correlation.  Reconfirmed normal heart size and within limits of normal pulmonary vasculature.  Stable elevated right hemidiaphragm.  No focal right lung infiltrate, right pleural fluid, or right pneumothorax.  Stable deformity right humeral head felt indicative of remote healed fracture.  Some stable bilateral right greater than left shoulder girdle arthritic changes and mild thoracic dextrocurvature and some degenerative changes in the spine.  EKG leads superimposed chest and abdomen.      Medications:  Reconciled Home Medications:      Medication List        START taking these medications      sodium chloride 0.9% SolP 50 mL with DAPTOmycin 500 mg SolR 500 mg  Inject 500 mg into the vein once daily. End date 2/29/24     valACYclovir 1000 MG tablet  Commonly known as: VALTREX  Take 1 tablet (1,000 mg total) by mouth 2 (two) times daily. for 7 days            CHANGE how you take these medications      apixaban 5 mg Tab  Commonly known as: ELIQUIS  Take 1 tablet (5 mg total) by mouth 2 (two) times daily.  What changed:   medication strength  how much to take     metoprolol  tartrate 50 MG tablet  Commonly known as: LOPRESSOR  Take 1 tablet (50 mg total) by mouth 2 (two) times daily.  What changed: when to take this            CONTINUE taking these medications      acetaminophen 325 MG tablet  Commonly known as: TYLENOL  Take 650 mg by mouth every 8 (eight) hours as needed for Pain.     amlodipine-benazepril 5-20 mg 5-20 mg per capsule  Commonly known as: LOTREL  Take 1 capsule by mouth once daily.     cyclobenzaprine 10 MG tablet  Commonly known as: FLEXERIL  Take 1 tablet (10 mg total) by mouth 3 (three) times daily as needed for Muscle spasms.     docusate sodium 100 MG capsule  Commonly known as: COLACE  Take 1 capsule (100 mg total) by mouth 2 (two) times daily.     EScitalopram oxalate 20 MG tablet  Commonly known as: LEXAPRO  Take 1 tablet (20 mg total) by mouth every evening.     omeprazole 40 MG capsule  Commonly known as: PRILOSEC  Take 1 capsule (40 mg total) by mouth every morning.     oxyCODONE-acetaminophen  mg per tablet  Commonly known as: PERCOCET  Take 1 tablet by mouth every 4 to 6 hours as needed for Pain.     pregabalin 200 MG Cap  Commonly known as: LYRICA  Take 1 capsule (200 mg total) by mouth 3 (three) times daily. TAKE 1 CAPSULE(200 MG) BY MOUTH THREE TIMES DAILY     traMADoL 50 mg tablet  Commonly known as: ULTRAM  Take 2 tablets by mouth every 12 (twelve) hours as needed for Pain.            STOP taking these medications      amiodarone 200 MG Tab  Commonly known as: PACERONE     fluconazole 100 MG tablet  Commonly known as: DIFLUCAN     sulfamethoxazole-trimethoprim 800-160mg 800-160 mg Tab  Commonly known as: BACTRIM DS              Indwelling Lines/Drains at time of discharge:   Lines/Drains/Airways       Peripherally Inserted Central Catheter Line  Duration             PICC Double Lumen 01/22/24 1115 right basilic 7 days              Drain  Duration                  Closed/Suction Drain 01/25/24 0100 Tube - 1 Left;Lateral Thigh 16 Fr. 4 days          Urethral Catheter 01/29/24 1129 <1 day                    Time spent on the discharge of patient: 40 minutes         Jerry Jensen MD  Department of Hospital Medicine  [facility] - Telemetry Stepdown

## 2024-01-29 NOTE — PROGRESS NOTES
[facility] - Telemetry Stepdown  Orthopedics  Progress Note    Attg Note:  I agree with the resident's assessment and plan.    Bulmaro Tellez MD      Patient Name: Froilan Ray  MRN: [id number]  Admission Date: 1/17/2024  Hospital Length of Stay: 12 days  Attending Provider: Jerry Jensen MD  Primary Care Provider: Alphonse Boothe III, MD  Follow-up For: Procedure(s) (LRB):  Irrigation and debridement left hip, (Left)    Post-Operative Day: 4 Days Post-Op  Subjective:     Principal Problem:Sepsis due to methicillin resistant Staphylococcus aureus (MRSA)    Principal Orthopedic Problem: same    Interval History: NAEON. VSS. Pain controlled. Drain output 10cc/24 hr, dced today. Plan to dc home today with  IV abx. PT recommending SNF, but pt refused. Pt understands high risk of going home at this point with limited mobility.      Review of patient's allergies indicates:  No Known Allergies    Current Facility-Administered Medications   Medication    (Magic mouthwash) 1:1:1 diphenhydrAMINE(Benadryl) 12.5mg/5ml liq, aluminum & magnesium hydroxide-simethicone (Maalox), LIDOcaine viscous 2%    acetaminophen tablet 1,000 mg    aluminum-magnesium hydroxide-simethicone 200-200-20 mg/5 mL suspension 30 mL    amiodarone tablet 200 mg    amLODIPine tablet 5 mg    apixaban tablet 5 mg    docusate sodium capsule 100 mg    EScitalopram oxalate tablet 20 mg    guaiFENesin 100 mg/5 ml syrup 200 mg    oxyCODONE immediate release tablet 5 mg    And    oxyCODONE immediate release tablet Tab 10 mg    And    HYDROmorphone injection 1 mg    LIDOcaine viscous HCl 2% oral solution 5 mL    melatonin tablet 6 mg    methocarbamoL tablet 500 mg    metoprolol tartrate (LOPRESSOR) tablet 50 mg    naloxone 0.4 mg/mL injection 0.02 mg    nystatin 100,000 unit/mL suspension 500,000 Units    ondansetron disintegrating tablet 8 mg    polyethylene glycol packet 17 g    pregabalin capsule 200 mg    simethicone chewable tablet 80 mg    sodium  "chloride 0.9% flush 1-10 mL    sodium chloride 0.9% flush 10 mL    And    sodium chloride 0.9% flush 10 mL    sodium chloride 0.9% flush 10 mL    valACYclovir tablet 1,000 mg    vancomycin - pharmacy to dose     Objective:     Vital Signs (Most Recent):  Temp: 98.4 °F (36.9 °C) (01/29/24 1533)  Pulse: 64 (01/29/24 1533)  Resp: 18 (01/29/24 1533)  BP: (!) 108/53 (01/29/24 1533)  SpO2: 95 % (01/29/24 1533) Vital Signs (24h Range):  Temp:  [97.5 °F (36.4 °C)-99.3 °F (37.4 °C)] 98.4 °F (36.9 °C)  Pulse:  [58-69] 64  Resp:  [17-20] 18  SpO2:  [95 %-97 %] 95 %  BP: (108-138)/(53-72) 108/53     Weight: 63.9 kg (140 lb 14 oz)  Height: 5' 6" (167.6 cm)  Body mass index is 22.74 kg/m².      Intake/Output Summary (Last 24 hours) at 1/29/2024 1541  Last data filed at 1/29/2024 0708  Gross per 24 hour   Intake 0 ml   Output 1155 ml   Net -1155 ml        Ortho/SPM Exam     Awake/alert/oriented x3, No acute distress, Afebrile, Vital signs stable  Good inspiratory effort with unlaboured breathing  WV with good suction  NVI in operative limb         Significant Labs: All pertinent labs within the past 24 hours have been reviewed.    Significant Imaging: I have reviewed all pertinent imaging results/findings.  Assessment/Plan:     Staphylococcal arthritis of left hip  Froilan Ray is a 78 y.o. female w/PMH of HTN, HLD, CHF, chronic pain on opioids, peripheral neuropathy, L-intertroch fx s/p TFNA (12/11/2018, Dr. Eulalio De La Cruz), who presents as transfer from OSH with large left hip fluid collection and XR showing femoral head collapse with cut-through of the TFNA helical blade. Blood cx + for MRSA, on vanc/ceftaroline, ID consulted. Pt underwent IR aspiration of L hip on 1/18 with 72cc of juan pus aspirated, gram stain + for gram positive cocci in clusters.    » s/p nail removal and abx spacer 1/19  Now s/p repeat I&D of left hip on 1/25      Pt labs improving. Plan for dc home today with HH IV abx. Pt refused SNF. She " understands risks of going home at this point with limited mobility and would still like to go home.       Pain control: MM  PT/OT:  TTWB LLE  DVT PPx: eliquis 5 bid for a fib, SCDs at all times when not ambulating  Abx: 6 weeks vanc per ID following by chronic suppressions  Cx: hip aspiration cx growing MRSA       » Dispo: Dc home today with HH     Fu in 1 week for WV removal.         Painful orthopaedic hardware  .          Paul Jimenez MD  Orthopedics  [facility] - Telemetry Stepdown

## 2024-01-29 NOTE — PROGRESS NOTES
Therapy with vancomycin complete and/or consult discontinued by provider.  Pharmacy will sign off, please re-consult as needed.    Ainsley Sotomayor, PharmD, BCPS  Clinical Pharmacist - Internal Medicine   [phone]

## 2024-01-29 NOTE — NURSING
Bladder scan done at 8.30PM 01/28 residual volume 123ml. Notified  Patient connect with external catheter. Encouraged to void spontaneously. Monitoring continued.

## 2024-01-29 NOTE — PLAN OF CARE
Notified [facility] and [facility] [place] today. Sent  orders in careport.    4:19 PM  Patient to have first dose of dapto prior to DC today. Spouse reports he will bring patient home today.    Indira Mckinley, MU  [facility] [address]  Ext. [phone]

## 2024-01-29 NOTE — PT/OT/SLP PROGRESS
Physical Therapy      Patient Name:  Froilan Ray   MRN:  [id number]    Patient not seen today secondary to Off the floor for procedure/surgery (Pt TARA away for I&D of L hip). Will follow-up on next scheduled visit.

## 2024-01-29 NOTE — PLAN OF CARE
Problem: Adult Inpatient Plan of Care  Goal: Plan of Care Review  Outcome: Ongoing, Progressing  Goal: Patient-Specific Goal (Individualized)  Outcome: Ongoing, Progressing  Goal: Absence of Hospital-Acquired Illness or Injury  Outcome: Ongoing, Progressing  Goal: Optimal Comfort and Wellbeing  Outcome: Ongoing, Progressing  Goal: Readiness for Transition of Care  Outcome: Ongoing, Progressing     Problem: Adjustment to Illness (Sepsis/Septic Shock)  Goal: Optimal Coping  Outcome: Ongoing, Progressing     Problem: Bleeding (Sepsis/Septic Shock)  Goal: Absence of Bleeding  Outcome: Ongoing, Progressing     Problem: Glycemic Control Impaired (Sepsis/Septic Shock)  Goal: Blood Glucose Level Within Desired Range  Outcome: Ongoing, Progressing     Problem: Infection Progression (Sepsis/Septic Shock)  Goal: Absence of Infection Signs and Symptoms  Outcome: Ongoing, Progressing     Problem: Nutrition Impaired (Sepsis/Septic Shock)  Goal: Optimal Nutrition Intake  Outcome: Ongoing, Progressing     Problem: Fluid and Electrolyte Imbalance (Acute Kidney Injury/Impairment)  Goal: Fluid and Electrolyte Balance  Outcome: Ongoing, Progressing     Problem: Renal Function Impairment (Acute Kidney Injury/Impairment)  Goal: Effective Renal Function  Outcome: Ongoing, Progressing     Problem: Skin Injury Risk Increased  Goal: Skin Health and Integrity  Outcome: Ongoing, Progressing     Problem: Fall Injury Risk  Goal: Absence of Fall and Fall-Related Injury  Outcome: Ongoing, Progressing     Problem: Infection  Goal: Absence of Infection Signs and Symptoms  Outcome: Ongoing, Progressing   Pt was discharged to home with  via personal car. IV access was removed, barrera catheter and PICC remained in place per MD. Upon assessing pt ambulate recommended to  that stretcher via ambulance was best for comfort and due to pts limited mobility,  refused and wanted to take personal car. Assisted allen and pt to car, pt was  not able to stand without max assistance. Charge nurse was informed. Pt and  was informed of the risk of immobility and weakness attempting to get out the car prior to dc.

## 2024-01-29 NOTE — PROGRESS NOTES
[facility] - Telemetry Stepdown  Adult Nutrition  Progress Note    SUMMARY     Recommendation/Intervention:   1) Continue current regular diet as tolerated, encourage PO intake, honor food preferences as able  2) Continue Boost Plus TID with all meals to promote adequate kcal/protein consumption  3) RD will monitor weight, labs, PO intake/tolerance, skin    Goals: Meet % of EEN/EPN by next RD follow-up date  Nutrition Goal Status: new  Communication of RD Recs: other (comment) (POC)    Assessment and Plan    Nutrition Problem  Inadequate energy intake    Related to (etiology):   Sepsis, poor appetite     Signs and Symptoms (as evidenced by):   ~25-50% PO intake at recent meals     Interventions/Recommendations (treatment strategy):  Collaboration with other providers  [place]  General diet    Nutrition Diagnosis Status:   New      Malnutrition Assessment  Unable to complete NFPE at this visit- will re-attempt at f/u    Reason for Assessment    Reason For Assessment: length of stay  Diagnosis: other (see comments) (Sepsis due to MRSA)  Relevant Medical History: HTN, chronic systolic CHF, atrial fibrillation, irritable bowel syndrome, diverticulosis, seizure disorder, iron deficiency anemia, cervical and lumbar generative disc disease,  arthritis, peripheral neuropathy, history of epigastric hernia repair, history of left intertrochanteric femur fracture status post open reduction internal fixation with intramedullary device on 12/11/2018, chronic pain on opioids  Interdisciplinary Rounds: did not attend  General Information Comments: Pt seen by RD for LOS visit. Pt sleeping during visit- spoke to RN. RN states pt with ~25-50% recent PO intake at meals. Pt slowly picks at food, poor appetite. No chewing/swallowing issues per RN. Receiving ONS- drinking some. LBM 1/28  Nutrition Discharge Planning: Adequate PO intake    Nutrition Risk Screen    Nutrition Risk Screen: no indicators  "present    Nutrition/Diet History    Spiritual, Cultural Beliefs, Gnosticist Practices, Values that Affect Care: no  Food Allergies: NKFA    Anthropometrics    Temp: 98.8 °F (37.1 °C)  Height Method: Stated  Height: 5' 6" (167.6 cm)  Height (inches): 66 in  Weight Method: Bed Scale  Weight: 63.9 kg (140 lb 14 oz)  Weight (lb): 140.88 lb  Ideal Body Weight (IBW), Female: 130 lb  % Ideal Body Weight, Female (lb): 108.37 %  BMI (Calculated): 22.7  BMI Grade: 18.5-24.9 - normal       Lab/Procedures/Meds    Pertinent Labs Reviewed: reviewed  Pertinent Labs Comments: Hgb: 8.2, Hct: 26.4, Na: 130, CO2: 21, Calcium: 8.6, Albumin: 1.3  Pertinent Medications Reviewed: reviewed   acetaminophen  1,000 mg Oral TID    amiodarone  200 mg Oral Daily    amLODIPine  5 mg Oral Daily    apixaban  5 mg Oral BID    docusate sodium  100 mg Oral BID    EScitalopram oxalate  20 mg Oral Daily    LIDOcaine viscous HCl 2%  5 mL Mucous Membrane Q4H    methocarbamoL  500 mg Oral TID    metoprolol tartrate  50 mg Oral BID    nystatin  500,000 Units Oral QID    pregabalin  200 mg Oral TID    sodium chloride 0.9%  10 mL Intravenous Q6H    valACYclovir  1,000 mg Oral BID    vancomycin (VANCOCIN) IV (PEDS and ADULTS)  1,000 mg Intravenous Once       Estimated/Assessed Needs    Weight Used For Calorie Calculations: 63.5 kg (140 lb)  Energy Calorie Requirements (kcal): 1370 kcal  Energy Need Method: [place] (x 1.2)  Protein Requirements: 76-89 g (1.2-1.4 g/kg)  Weight Used For Protein Calculations: 63.5 kg (140 lb)  Fluid Requirements (mL): 1 mL/kcal or per MD  Estimated Fluid Requirement Method: RDA Method  RDA Method (mL): 1370    Nutrition Prescription Ordered    Current Diet Order: Regular Diet  Nutrition Order Comments: ~25-50% PO intake  Oral Nutrition Supplement: Both Boost Glucose Control TID and Boost Plus TID ordered    Evaluation of Received Nutrient/Fluid Intake    I/O: - 3.1 L since admit  Energy Calories Required: not meeting " needs  Protein Required: not meeting needs  Fluid Required: other (see comments) (As per MD)  Comments: LBM 1/28  Tolerance: tolerating  % Intake of Estimated Energy Needs: 25 - 50 %  % Meal Intake: 25 - 50 %    Nutrition Risk    Level of Risk/Frequency of Follow-up:  (F/u 1-2x/week)     Monitor and Evaluation    Food and Nutrient Intake: energy intake, food and beverage intake  Food and Nutrient Adminstration: diet order  Knowledge/Beliefs/Attitudes: food and nutrition knowledge/skill  Physical Activity and Function: nutrition-related ADLs and IADLs  Anthropometric Measurements: weight, weight change, body mass index  Biochemical Data, Medical Tests and Procedures: electrolyte and renal panel, gastrointestinal profile, glucose/endocrine profile, inflammatory profile, lipid profile  Nutrition-Focused Physical Findings: overall appearance, skin     Nutrition Follow-Up    RD Follow-up?: Yes

## 2024-01-29 NOTE — NURSING
Bladder scan done at 6AM volume detected 498ml. Straight cath done removed 450ml. Post removal bladder volume 35ml. Notified  Patient did not void anything spontaneously. Encouraged to void patient connect with external catheter. Closed suction drain inplaced removed 10ml drain in morning.

## 2024-01-29 NOTE — SUBJECTIVE & OBJECTIVE
Interval History: Waiting on removal of drain before going home. Requiring straight cath for urinary retention every day. Will continue to have this problem at home so place Boyd catheter.    Review of Systems   Constitutional:  Negative for chills and fever.   Musculoskeletal:  Positive for arthralgias and gait problem.     Objective:     Vital Signs (Most Recent):  Temp: 99.3 °F (37.4 °C) (01/29/24 0738)  Pulse: 67 (01/29/24 0738)  Resp: 20 (01/29/24 0553)  BP: 137/63 (01/29/24 0738)  SpO2: 95 % (01/29/24 0738) Vital Signs (24h Range):  Temp:  [97.5 °F (36.4 °C)-99.3 °F (37.4 °C)] 99.3 °F (37.4 °C)  Pulse:  [58-73] 67  Resp:  [16-20] 20  SpO2:  [95 %-97 %] 95 %  BP: (112-138)/(62-74) 137/63     Weight: 63.9 kg (140 lb 14 oz)  Body mass index is 22.74 kg/m².    Intake/Output Summary (Last 24 hours) at 1/29/2024 0858  Last data filed at 1/29/2024 0708  Gross per 24 hour   Intake 0 ml   Output 1155 ml   Net -1155 ml           Physical Exam  Vitals and nursing note reviewed.   Constitutional:       General: She is not in acute distress.     Appearance: She is well-developed and normal weight. She is not diaphoretic.   Musculoskeletal:      Comments: Left hip dressed with drain   Neurological:      Mental Status: She is alert and oriented to person, place, and time. Mental status is at baseline.      Motor: No seizure activity.   Psychiatric:         Attention and Perception: Attention normal.         Mood and Affect: Affect normal.         Behavior: Behavior is not aggressive. Behavior is cooperative.             Significant Labs: All pertinent labs within the past 24 hours have been reviewed.    Significant Imaging: I have reviewed all pertinent imaging results/findings within the past 24 hours.

## 2024-01-29 NOTE — PLAN OF CARE
Recommendation/Intervention:   1) Continue current regular diet as tolerated, encourage PO intake, honor food preferences as able  2) Continue Boost Plus TID with all meals to promote adequate kcal/protein consumption  3) RD will monitor weight, labs, PO intake/tolerance, skin     Goals: Meet % of EEN/EPN by next RD follow-up date  Nutrition Goal Status: new  Communication of RD Recs: other (comment) (POC)

## 2024-01-29 NOTE — PLAN OF CARE
AOX4. VSS. Wound Vac NPWT 125mmhg in process. Closed suction inplaced. Urinary retention noted. Notified DR. Contact precaution continued. Safety precaution maintained. Call light within reach. Bed in low position.

## 2024-01-29 NOTE — ASSESSMENT & PLAN NOTE
Froilan Ray is a 78 y.o. female w/PMH of HTN, HLD, CHF, chronic pain on opioids, peripheral neuropathy, L-intertroch fx s/p TFNA (12/11/2018, Dr. Eulalio De La Cruz), who presents as transfer from OSH with large left hip fluid collection and XR showing femoral head collapse with cut-through of the TFNA helical blade. Blood cx + for MRSA, on vanc/ceftaroline, ID consulted. Pt underwent IR aspiration of L hip on 1/18 with 72cc of juan pus aspirated, gram stain + for gram positive cocci in clusters.    » s/p nail removal and abx spacer 1/19  Now s/p repeat I&D of left hip on 1/25      Pt labs improving. Plan for dc home today with HH IV abx. Pt refused SNF. She understands risks of going home at this point with limited mobility and would still like to go home.       Pain control: MM  PT/OT:  TTWB LLE  DVT PPx: eliquis 5 bid for a fib, SCDs at all times when not ambulating  Abx: 6 weeks vanc per ID following by chronic suppressions  Cx: hip aspiration cx growing MRSA       » Dispo: Dc home today with HH     Fu in 1 week for [place] removal.

## 2024-01-30 LAB
BACTERIA BLD CULT: NORMAL
BACTERIA BLD CULT: NORMAL

## 2024-01-30 NOTE — PLAN OF CARE
[facility] - Telemetry Stepdown  Discharge Final Note    Primary Care Provider: Alphonse Boothe III, MD    Expected Discharge Date: 1/29/2024    Final Discharge Note (most recent)       Final Note - 01/30/24 0842          Final Note    Assessment Type Final Discharge Note     Anticipated Discharge Disposition [place] [facility]     Hospital Resources/Appts/Education Provided Appointments scheduled and added to AVS        Post-Acute Status    Post-Acute Authorization Placement;Home Health;IV Infusion     Post-Acute Placement Status Patient declined/refused     Home Health Status Set-up Complete/Auth obtained   [facility]    IV Infusion Status Set-up Complete/Auth obtained   [facility]    Discharge Delays None known at this time                     Important Message from Medicare  Important Message from Medicare regarding Discharge Appeal Rights: Given to patient/caregiver, Explained to patient/caregiver, Signed/date by patient/caregiver     Date IMM was signed: 01/29/24  Time IMM was signed: 1509    Contact Info       Sreedhar Carlos NP   Specialty: Orthopedic Surgery    [address] [facility] [address]   Phone: [phone]       Next Steps: Follow up on 2/8/2024    Instructions: 1:30 pm    Haroldo Morfin MD   Specialty: Infectious Diseases    [address] [facility] [address]   Phone: [phone]       Next Steps: Follow up on 2/8/2024    Instructions: 9 am    [facility] - Urology [address] [address]   Specialty: Urology    [address]  [facility] [address]   Phone: [phone]       Next Steps: Follow up    Instructions: voiding trial when more mobile          Future Appointments   Date Time Provider Department Center   2/6/2024 11:00 AM [facility]   2/8/2024  9:00 AM Haroldo Morfin MD [facility] ID [facility]   2/8/2024  1:30 PM Sreedhar Carlos NP [facility] ORTHO [facility]   2/14/2024  9:30 AM Alphonse Boothe III, [place] [facility]  [facility]   2/29/2024 10:00 AM Haroldo Morfin MD [facility] ID [facility]   3/7/2024 12:30 PM Sreedhar Carlos, NP [facility] ORTHO [facility]     Indira Mckinley, LCSW [facility]  Ext. [phone]

## 2024-01-30 NOTE — NURSING
Mary Cedillo and Gilberto Radford attempting to place patient in wheelchair to discharge home. Patient moaning and groaning and stating 9/10 pain to hip. Dilaudid IV push given via picc line with Mary Cedillo present. Patient placed in wheelchair and now awaiting on  to pull up car to discharge patient home.

## 2024-02-01 LAB
BACTERIA SPEC ANAEROBE CULT: NORMAL
BACTERIA SPEC ANAEROBE CULT: NORMAL

## 2024-02-05 ENCOUNTER — LAB VISIT (OUTPATIENT)
Dept: LAB | Facility: HOSPITAL | Age: 79
End: 2024-02-05
Attending: INTERNAL MEDICINE
Payer: MEDICARE

## 2024-02-05 DIAGNOSIS — A41.2: Primary | ICD-10-CM

## 2024-02-05 LAB
ALBUMIN SERPL BCP-MCNC: 1.6 G/DL (ref 3.5–5.2)
ALP SERPL-CCNC: 135 U/L (ref 55–135)
ALT SERPL W/O P-5'-P-CCNC: 14 U/L (ref 10–44)
ANION GAP SERPL CALC-SCNC: 10 MMOL/L (ref 8–16)
AST SERPL-CCNC: 25 U/L (ref 10–40)
BASOPHILS # BLD AUTO: 0.05 K/UL (ref 0–0.2)
BASOPHILS NFR BLD: 0.5 % (ref 0–1.9)
BILIRUB SERPL-MCNC: 0.2 MG/DL (ref 0.1–1)
BUN SERPL-MCNC: 14 MG/DL (ref 8–23)
CALCIUM SERPL-MCNC: 8.6 MG/DL (ref 8.7–10.5)
CHLORIDE SERPL-SCNC: 104 MMOL/L (ref 95–110)
CK SERPL-CCNC: 122 U/L (ref 20–180)
CO2 SERPL-SCNC: 24 MMOL/L (ref 23–29)
CREAT SERPL-MCNC: 1 MG/DL (ref 0.5–1.4)
CRP SERPL-MCNC: 163.8 MG/L (ref 0–8.2)
DIFFERENTIAL METHOD BLD: ABNORMAL
EOSINOPHIL # BLD AUTO: 0.1 K/UL (ref 0–0.5)
EOSINOPHIL NFR BLD: 0.7 % (ref 0–8)
ERYTHROCYTE [DISTWIDTH] IN BLOOD BY AUTOMATED COUNT: 19.3 % (ref 11.5–14.5)
EST. GFR  (NO RACE VARIABLE): 58 ML/MIN/1.73 M^2
GLUCOSE SERPL-MCNC: 89 MG/DL (ref 70–110)
HCT VFR BLD AUTO: 23.7 % (ref 37–48.5)
HGB BLD-MCNC: 7 G/DL (ref 12–16)
IMM GRANULOCYTES # BLD AUTO: 0.13 K/UL (ref 0–0.04)
IMM GRANULOCYTES NFR BLD AUTO: 1.2 % (ref 0–0.5)
LYMPHOCYTES # BLD AUTO: 1.1 K/UL (ref 1–4.8)
LYMPHOCYTES NFR BLD: 9.6 % (ref 18–48)
MCH RBC QN AUTO: 27.7 PG (ref 27–31)
MCHC RBC AUTO-ENTMCNC: 29.5 G/DL (ref 32–36)
MCV RBC AUTO: 94 FL (ref 82–98)
MONOCYTES # BLD AUTO: 0.6 K/UL (ref 0.3–1)
MONOCYTES NFR BLD: 5 % (ref 4–15)
NEUTROPHILS # BLD AUTO: 9.2 K/UL (ref 1.8–7.7)
NEUTROPHILS NFR BLD: 83 % (ref 38–73)
NRBC BLD-RTO: 0 /100 WBC
PLATELET # BLD AUTO: 560 K/UL (ref 150–450)
PMV BLD AUTO: 9.2 FL (ref 9.2–12.9)
POTASSIUM SERPL-SCNC: 4.2 MMOL/L (ref 3.5–5.1)
PROT SERPL-MCNC: 6 G/DL (ref 6–8.4)
RBC # BLD AUTO: 2.53 M/UL (ref 4–5.4)
SODIUM SERPL-SCNC: 138 MMOL/L (ref 136–145)
WBC # BLD AUTO: 11.04 K/UL (ref 3.9–12.7)

## 2024-02-05 PROCEDURE — 82550 ASSAY OF CK (CPK): CPT | Performed by: INTERNAL MEDICINE

## 2024-02-05 PROCEDURE — 86140 C-REACTIVE PROTEIN: CPT | Performed by: INTERNAL MEDICINE

## 2024-02-05 PROCEDURE — 36415 COLL VENOUS BLD VENIPUNCTURE: CPT | Performed by: INTERNAL MEDICINE

## 2024-02-05 PROCEDURE — 80053 COMPREHEN METABOLIC PANEL: CPT | Performed by: INTERNAL MEDICINE

## 2024-02-05 PROCEDURE — 85025 COMPLETE CBC W/AUTO DIFF WBC: CPT | Performed by: INTERNAL MEDICINE

## 2024-02-06 ENCOUNTER — HOSPITAL ENCOUNTER (EMERGENCY)
Facility: HOSPITAL | Age: 79
Discharge: HOME | End: 2024-02-06
Attending: EMERGENCY MEDICINE
Payer: MEDICARE

## 2024-02-06 VITALS
OXYGEN SATURATION: 97 % | BODY MASS INDEX: 24.11 KG/M2 | SYSTOLIC BLOOD PRESSURE: 199 MMHG | DIASTOLIC BLOOD PRESSURE: 89 MMHG | TEMPERATURE: 98 F | RESPIRATION RATE: 20 BRPM | WEIGHT: 150 LBS | HEIGHT: 66 IN | HEART RATE: 91 BPM

## 2024-02-06 DIAGNOSIS — Z78.9 PROBLEM WITH VASCULAR ACCESS: Primary | ICD-10-CM

## 2024-02-06 PROCEDURE — 63600175 PHARM REV CODE 636 W HCPCS: Mod: JZ,JG | Performed by: EMERGENCY MEDICINE

## 2024-02-06 PROCEDURE — 99284 EMERGENCY DEPT VISIT MOD MDM: CPT | Mod: 25

## 2024-02-06 PROCEDURE — 36573 INSJ PICC RS&I 5 YR+: CPT

## 2024-02-06 PROCEDURE — 25000003 PHARM REV CODE 250: Performed by: EMERGENCY MEDICINE

## 2024-02-06 PROCEDURE — 96374 THER/PROPH/DIAG INJ IV PUSH: CPT | Mod: 59

## 2024-02-06 PROCEDURE — C1751 CATH, INF, PER/CENT/MIDLINE: HCPCS

## 2024-02-06 RX ORDER — SODIUM CHLORIDE 0.9 % (FLUSH) 0.9 %
10 SYRINGE (ML) INJECTION EVERY 6 HOURS
Status: DISCONTINUED | OUTPATIENT
Start: 2024-02-07 | End: 2024-02-07 | Stop reason: HOSPADM

## 2024-02-06 RX ORDER — SODIUM CHLORIDE 0.9 % (FLUSH) 0.9 %
10 SYRINGE (ML) INJECTION
Status: DISCONTINUED | OUTPATIENT
Start: 2024-02-06 | End: 2024-02-07 | Stop reason: HOSPADM

## 2024-02-06 RX ADMIN — DAPTOMYCIN 500 MG: 500 INJECTION, POWDER, LYOPHILIZED, FOR SOLUTION INTRAVENOUS at 10:02

## 2024-02-06 NOTE — FIRST PROVIDER EVALUATION
Emergency Department TeleTriage Encounter Note      CHIEF COMPLAINT    Chief Complaint   Patient presents with    Vascular Access Problem     Pt states picc line fell out @ 1 hr ago. She is requesting replacement & barrera replacement       VITAL SIGNS   Initial Vitals [02/06/24 1655]   BP Pulse Resp Temp SpO2   (!) 167/92 98 20 98.3 °F (36.8 °C) 95 %      MAP       --            ALLERGIES    Review of patient's allergies indicates:  No Known Allergies    PROVIDER TRIAGE NOTE  Patient presents with complaint of needing PICC line replaced.      Phy:   Constitutional: well nourished, well developed, appearing stated age, NAD        Initial orders will be placed and care will be transferred to an alternate provider when patient is roomed for a full evaluation. Any additional orders and the final disposition will be determined by that provider.        ORDERS  Labs Reviewed - No data to display    ED Orders (720h ago, onward)      None              Virtual Visit Note: The provider triage portion of this emergency department evaluation and documentation was performed via [employer], a HIPAA-compliant telemedicine application, in concert with a tele-presenter in the room. A face to face patient evaluation with one of my colleagues will occur once the patient is placed in an emergency department room.      DISCLAIMER: This note was prepared with [employer] voice recognition transcription software. Garbled syntax, mangled pronouns, and other bizarre constructions may be attributed to that software system.

## 2024-02-07 PROCEDURE — G0179 MD RECERTIFICATION HHA PT: HCPCS | Mod: ,,, | Performed by: FAMILY MEDICINE

## 2024-02-07 NOTE — PLAN OF CARE
Called by ED charge nurse for CM consult.  PT in ED to received a new PICC line, which has been placed.  She has [facility], and her son tells me she is supposed to be receiving daily antibiotics.  He states that no on e from [employer] came over the weekend, therefore, the pt did not receive her daily antibiotic for Saturday or Sunday.  Asked pt's son and  if HH had trained either of them to properly give the antibiotic.  They stated no.  Son stated patient lives with him.  He gets home from work about 3:30 in the evenings in case they would need to show him how to give the medication if HH cannot come.  Unable to contact HH at this hour to follow up.  Message sent to ED , Delma,  to follow up in 8a.m.

## 2024-02-07 NOTE — PLAN OF CARE
02/07/24 1022   Discharge Assessment   Assessment Type Discharge Planning Reassessment   Confirmed/corrected address, phone number and insurance Yes   Discharge Plan A Home with family   Discharge Plan B Home Health      contacted the  agency and spoke with Kylah about the visit over the weekend. The patient's spouse promised to administer the antibodies to the patient upon discharge, but this did not go through. [facility] saw the patient for four days last week. The son is at home and is set to meet with [facility] today at 5:00 p.m. to learn how to administer the antibodies.

## 2024-02-07 NOTE — ED PROVIDER NOTES
Encounter Date: 2/6/2024       History     Chief Complaint   Patient presents with    Vascular Access Problem     Pt states picc line fell out @ 1 hr ago. She is requesting replacement & barrera replacement     Complaint is PICC line dislodgement.  The patient earlier today noted that PICC line was dislodged and totally out of her body.  She was sent in by home health to get 1 put in.  She denies any complaints other than she needs a sticky pad for her Barrera catheter.  She recently had surgery on her left hip with for infection.  She is supposed to get daily IV antibiotics for 42 days.  However she has been having trouble getting it daily over the last few days for some reason.  According to the son she had been eating well but is weak from the surgery.        Review of patient's allergies indicates:  No Known Allergies  Past Medical History:   Diagnosis Date    Anemia     Arthritis     Bleeding ulcer 07/2016    Chronic pain     DDD (degenerative disc disease), cervical     DDD (degenerative disc disease), lumbar     Depression     Disc degeneration, lumbosacral     Diverticulitis     Encounter for blood transfusion     Hypertension     IBS (irritable bowel syndrome)     Neuropathy of both feet     Seizures     none  since 2017    Umbilical hernia 2020     Past Surgical History:   Procedure Laterality Date    ARTHROPLASTY, HIP, GIRDLESTONE, POSTERIOR APPROACH Left 1/19/2024    Procedure: ARTHROPLASTY, HIP, GIRDLESTONE, POSTERIOR APPROACH;  Surgeon: Bumlaro Tellez MD;  Location: [facility] OR [address] [place] [address];  Service: Orthopedics;  Laterality: Left;    ARTHROPLASTY, HIP, GIRDLESTONE, POSTERIOR APPROACH Left 1/25/2024    Procedure: Irrigation and debridement left hip,;  Surgeon: Juanito Painting MD;  Location: [facility] OR [address] [place] [address];  Service: Orthopedics;  Laterality: Left;    BACK SURGERY      BREAST BIOPSY      BREAST SURGERY Right     lumpectomy    CAUDAL EPIDURAL STEROID INJECTION N/A 01/28/2022    Procedure:  Injection-steroid-epidural-caudal;  Surgeon: Luzmaria Marcelino MD;  [facility];  Service: Pain Management;  Laterality: N/A;    COLONOSCOPY N/A 01/14/2022    Procedure: COLONOSCOPY;  Surgeon: Abby Landers MD;  Location: [facility] ENDO;  Service: Endoscopy;  Laterality: N/A;    ENDOSCOPIC ULTRASOUND OF UPPER GASTROINTESTINAL TRACT N/A 07/21/2020    Procedure: ULTRASOUND, UPPER GI TRACT, ENDOSCOPIC;  Surgeon: Marcio Nguyễn III, MD;  Location: [facility] ENDO;  Service: Endoscopy;  Laterality: N/A;    ESOPHAGOGASTRODUODENOSCOPY N/A 01/14/2022    Procedure: EGD (ESOPHAGOGASTRODUODENOSCOPY);  Surgeon: Abby Landers MD;  Location: [facility] ENDO;  Service: Endoscopy;  Laterality: N/A;    ESOPHAGOGASTRODUODENOSCOPY N/A 06/10/2022    Procedure: EGD (ESOPHAGOGASTRODUODENOSCOPY);  Surgeon: Abby Landers MD;  Location: [facility] ENDO;  Service: Endoscopy;  Laterality: N/A;    EYE SURGERY      cataract    HYSTERECTOMY      INTRAMEDULLARY RODDING OF TROCHANTER OF FEMUR Left 12/11/2018    Procedure: INSERTION, INTRAMEDULLARY SANTOS, FEMUR, TROCHANTER;  Surgeon: Eulalio De La Cruz MD;  Location: [facility] OR;  Service: Orthopedics;  Laterality: Left;    IRRIGATION AND DEBRIDEMENT OF LOWER EXTREMITY Left 1/19/2024    Procedure: IRRIGATION AND DEBRIDEMENT, LOWER EXTREMITY;  Surgeon: Bulmaro Tellez MD;  Location: [address];  Service: Orthopedics;  Laterality: Left;    REPAIR OF EPIGASTRIC HERNIA N/A 12/7/2023    Procedure: REPAIR, HERNIA, EPIGASTRIC;  Surgeon: Vipul Escobar MD;  Location: [facility] OR;  Service: General;  Laterality: N/A;    SPINAL CORD STIMULATOR IMPLANT  09/18/2013    and removal    SPINE SURGERY  2006    lumbar L2-S1 decompression.    SPINE SURGERY      cervical decompression    TONSILLECTOMY       Family History   Problem Relation Age of Onset    Diabetes Mother     Hypertension Mother     Irritable bowel syndrome Mother     Diabetes Father         insulin dependent    Hypertension Father     Heart  disease Father     Coronary artery disease Father     Depression Father     Diabetes Sister     Diabetes Brother     COPD Brother      Social History     Tobacco Use    Smoking status: Never    Smokeless tobacco: Never   Substance Use Topics    Alcohol use: No    Drug use: No     Review of Systems   Constitutional:  Negative for chills and fever.   HENT:  Negative for ear pain, rhinorrhea and sore throat.    Eyes:  Negative for pain and visual disturbance.   Respiratory:  Negative for cough and shortness of breath.    Cardiovascular:  Negative for chest pain and palpitations.   Gastrointestinal:  Negative for abdominal pain, constipation, diarrhea, nausea and vomiting.   Genitourinary:  Negative for dysuria, frequency, hematuria and urgency.   Musculoskeletal:  Negative for back pain, joint swelling and myalgias.   Skin:  Negative for rash.   Neurological:  Negative for dizziness, seizures, weakness and headaches.   Psychiatric/Behavioral:  Negative for dysphoric mood. The patient is not nervous/anxious.        Physical Exam     Initial Vitals [02/06/24 1655]   BP Pulse Resp Temp SpO2   (!) 167/92 98 20 98.3 °F (36.8 °C) 95 %      MAP       --         Physical Exam    Nursing note and vitals reviewed.  Constitutional: She appears well-developed and well-nourished.   HENT:   Head: Normocephalic and atraumatic.   Eyes: Conjunctivae, EOM and lids are normal. Pupils are equal, round, and reactive to light.   Neck: Trachea normal. Neck supple. No thyroid mass and no thyromegaly present.   Normal range of motion.  Cardiovascular:  Normal rate, regular rhythm and normal heart sounds.           Pulmonary/Chest: Effort normal and breath sounds normal.   Abdominal: Abdomen is soft. There is no abdominal tenderness.   Genitourinary:    Genitourinary Comments: Boyd in place draining clear fluid     Musculoskeletal:         General: Normal range of motion.      Cervical back: Normal range of motion and neck supple.      Neurological: She is alert and oriented to person, place, and time. She has normal strength and normal reflexes. No cranial nerve deficit or sensory deficit.   Skin: Skin is warm and dry.   Psychiatric: She has a normal mood and affect. Her speech is normal and behavior is normal. Judgment and thought content normal.         ED Course   Procedures  Labs Reviewed - No data to display       Imaging Results              X-Ray Chest 1 View S/P PICC Line by Nursing (In process)                      Medications   sodium chloride 0.9% flush 10 mL (has no administration in time range)     And   sodium chloride 0.9% flush 10 mL (has no administration in time range)   DAPTOmycin (CUBICIN) 500 mg in sodium chloride 0.9% SolP 50 mL IVPB (500 mg Intravenous New Bag 2/6/24 2215)     Medical Decision Making  Patient here for vascular access.  She accidentally pulled out her PICC line.  It was replaced.  Patient given antibiotics through it and patient will be discharged    Amount and/or Complexity of Data Reviewed  Radiology: ordered.    Risk  Prescription drug management.                                      Clinical Impression:  Final diagnoses:  [Z78.9] Problem with vascular access (Primary)          ED Disposition Condition    Discharge Stable          ED Prescriptions    None       Follow-up Information       Follow up With Specialties Details Why Contact Info    Alphonse Boothe III, MD Family Medicine Schedule an appointment as soon as possible for a visit in 1 week  [address]  [address]  [place] [address]  [phone]      Alphonse Botohe III, MD Family Medicine   [address]  [address]  [place] [address]  [phone]               Emile Kelly MD  02/06/24 6315

## 2024-02-07 NOTE — PROCEDURES
"Froilan Ray is a 78 y.o. female patient.    Temp: 98.3 °F (36.8 °C) (02/06/24 1655)  Pulse: 91 (02/06/24 2030)  Resp: 20 (02/06/24 2030)  BP: (!) 149/68 (02/06/24 2030)  SpO2: 97 % (02/06/24 2030)  Weight: 68 kg (150 lb) (02/06/24 1655)  Height: 5' 6" (167.6 cm) (02/06/24 1655)    PICC  Date/Time: 2/6/2024 9:05 PM  Performed by: Cipriano Turk RN  Consent Done: Yes  Time out: Immediately prior to procedure a time out was called to verify the correct patient, procedure, equipment, support staff and site/side marked as required  Indications: med administration and vascular access  Anesthesia: local infiltration  Local anesthetic: lidocaine 1% without epinephrine  Anesthetic Total (mL): 2  Preparation: skin prepped with ChloraPrep  Skin prep agent dried: skin prep agent completely dried prior to procedure  Sterile barriers: all five maximum sterile barriers used - cap, mask, sterile gown, sterile gloves, and large sterile sheet  Hand hygiene: hand hygiene performed prior to central venous catheter insertion  Location details: right basilic  Catheter type: double lumen  Catheter size: 5 Fr  Catheter Length: 36cm    Ultrasound guidance: yes  , compressibility normal  Vascular Doppler: not done  Needle advanced into vessel with real time Ultrasound guidance.  Guidewire confirmed in vessel.  Sterile sheath used.  Number of attempts: 1  Post-procedure: blood return through all ports, chlorhexidine patch and sterile dressing applied    Assessment: successful placement, tip termination and placement verified by x-ray          Name Saran Turk RN   2/6/2024    "

## 2024-02-08 ENCOUNTER — HOSPITAL ENCOUNTER (OUTPATIENT)
Dept: RADIOLOGY | Facility: HOSPITAL | Age: 79
Discharge: HOME | End: 2024-02-08
Attending: NURSE PRACTITIONER
Payer: MEDICARE

## 2024-02-08 ENCOUNTER — OFFICE VISIT (OUTPATIENT)
Dept: ORTHOPEDICS | Facility: CLINIC | Age: 79
End: 2024-02-08
Payer: MEDICARE

## 2024-02-08 ENCOUNTER — TELEPHONE (OUTPATIENT)
Dept: INFECTIOUS DISEASES | Facility: CLINIC | Age: 79
End: 2024-02-08

## 2024-02-08 ENCOUNTER — OFFICE VISIT (OUTPATIENT)
Dept: INFECTIOUS DISEASES | Facility: CLINIC | Age: 79
End: 2024-02-08
Payer: MEDICARE

## 2024-02-08 VITALS — BODY MASS INDEX: 24.1 KG/M2 | HEIGHT: 66 IN | WEIGHT: 149.94 LBS

## 2024-02-08 VITALS
BODY MASS INDEX: 24.21 KG/M2 | HEIGHT: 66 IN | DIASTOLIC BLOOD PRESSURE: 69 MMHG | TEMPERATURE: 99 F | SYSTOLIC BLOOD PRESSURE: 106 MMHG | HEART RATE: 97 BPM

## 2024-02-08 DIAGNOSIS — B95.62 MRSA BACTEREMIA: ICD-10-CM

## 2024-02-08 DIAGNOSIS — M00.052 STAPHYLOCOCCAL ARTHRITIS OF LEFT HIP: Primary | ICD-10-CM

## 2024-02-08 DIAGNOSIS — R78.81 MRSA BACTEREMIA: Primary | ICD-10-CM

## 2024-02-08 DIAGNOSIS — B95.62 MRSA BACTEREMIA: Primary | ICD-10-CM

## 2024-02-08 DIAGNOSIS — B35.9 TINEA: ICD-10-CM

## 2024-02-08 DIAGNOSIS — M00.052 STAPHYLOCOCCAL ARTHRITIS OF LEFT HIP: ICD-10-CM

## 2024-02-08 DIAGNOSIS — T84.84XA PAINFUL ORTHOPAEDIC HARDWARE: ICD-10-CM

## 2024-02-08 DIAGNOSIS — Z98.890 POST-OPERATIVE STATE: ICD-10-CM

## 2024-02-08 DIAGNOSIS — R78.81 MRSA BACTEREMIA: ICD-10-CM

## 2024-02-08 PROCEDURE — 73502 X-RAY EXAM HIP UNI 2-3 VIEWS: CPT | Mod: 26,LT,, | Performed by: RADIOLOGY

## 2024-02-08 PROCEDURE — 3288F FALL RISK ASSESSMENT DOCD: CPT | Mod: CPTII,S$GLB,, | Performed by: NURSE PRACTITIONER

## 2024-02-08 PROCEDURE — 3078F DIAST BP <80 MM HG: CPT | Mod: CPTII,S$GLB,, | Performed by: STUDENT IN AN ORGANIZED HEALTH CARE EDUCATION/TRAINING PROGRAM

## 2024-02-08 PROCEDURE — 1100F PTFALLS ASSESS-DOCD GE2>/YR: CPT | Mod: CPTII,S$GLB,, | Performed by: STUDENT IN AN ORGANIZED HEALTH CARE EDUCATION/TRAINING PROGRAM

## 2024-02-08 PROCEDURE — 1159F MED LIST DOCD IN RCRD: CPT | Mod: CPTII,S$GLB,, | Performed by: NURSE PRACTITIONER

## 2024-02-08 PROCEDURE — 1101F PT FALLS ASSESS-DOCD LE1/YR: CPT | Mod: CPTII,S$GLB,, | Performed by: NURSE PRACTITIONER

## 2024-02-08 PROCEDURE — 99999 PR PBB SHADOW E&M-EST. PATIENT-LVL III: CPT | Mod: PBBFAC,,, | Performed by: NURSE PRACTITIONER

## 2024-02-08 PROCEDURE — 1111F DSCHRG MED/CURRENT MED MERGE: CPT | Mod: CPTII,S$GLB,, | Performed by: STUDENT IN AN ORGANIZED HEALTH CARE EDUCATION/TRAINING PROGRAM

## 2024-02-08 PROCEDURE — 99999 PR PBB SHADOW E&M-EST. PATIENT-LVL III: CPT | Mod: PBBFAC,,, | Performed by: STUDENT IN AN ORGANIZED HEALTH CARE EDUCATION/TRAINING PROGRAM

## 2024-02-08 PROCEDURE — 1160F RVW MEDS BY RX/DR IN RCRD: CPT | Mod: CPTII,S$GLB,, | Performed by: NURSE PRACTITIONER

## 2024-02-08 PROCEDURE — 73502 X-RAY EXAM HIP UNI 2-3 VIEWS: CPT | Mod: TC,LT

## 2024-02-08 PROCEDURE — 99024 POSTOP FOLLOW-UP VISIT: CPT | Mod: S$GLB,,, | Performed by: NURSE PRACTITIONER

## 2024-02-08 PROCEDURE — 1125F AMNT PAIN NOTED PAIN PRSNT: CPT | Mod: CPTII,S$GLB,, | Performed by: NURSE PRACTITIONER

## 2024-02-08 PROCEDURE — 3074F SYST BP LT 130 MM HG: CPT | Mod: CPTII,S$GLB,, | Performed by: STUDENT IN AN ORGANIZED HEALTH CARE EDUCATION/TRAINING PROGRAM

## 2024-02-08 PROCEDURE — 1125F AMNT PAIN NOTED PAIN PRSNT: CPT | Mod: CPTII,S$GLB,, | Performed by: STUDENT IN AN ORGANIZED HEALTH CARE EDUCATION/TRAINING PROGRAM

## 2024-02-08 PROCEDURE — 3288F FALL RISK ASSESSMENT DOCD: CPT | Mod: CPTII,S$GLB,, | Performed by: STUDENT IN AN ORGANIZED HEALTH CARE EDUCATION/TRAINING PROGRAM

## 2024-02-08 PROCEDURE — 99213 OFFICE O/P EST LOW 20 MIN: CPT | Mod: S$GLB,,, | Performed by: STUDENT IN AN ORGANIZED HEALTH CARE EDUCATION/TRAINING PROGRAM

## 2024-02-08 RX ORDER — NYSTATIN 100000 [USP'U]/G
POWDER TOPICAL
Qty: 30 G | Refills: 0 | Status: SHIPPED | OUTPATIENT
Start: 2024-02-08 | End: 2025-02-07

## 2024-02-08 RX ORDER — DOXYCYCLINE 100 MG/1
100 CAPSULE ORAL EVERY 12 HOURS
Qty: 84 CAPSULE | Refills: 0 | Status: ON HOLD | OUTPATIENT
Start: 2024-02-08 | End: 2024-02-18 | Stop reason: HOSPADM

## 2024-02-08 NOTE — TELEPHONE ENCOUNTER
----- Message from Haroldo Morfin MD sent at 2/8/2024 12:53 PM CST -----  Can we relay message to [employer] to extend her therapy with daptomycin please?    New EOT for Daptomycin 03/20/2024

## 2024-02-08 NOTE — PROGRESS NOTES
INFECTIOUS DISEASE CLINIC  02/08/2024     Subjective:      Chief Complaint: MRSA infection of L hip     History of Present Illness:    78F with h/o HTN, CHF, L2-S1 Decompression (2006),  L intertrochanteric femur fracture s/p ORIF w/ intramedullary nail 12/2018.  And hernia erpair 12/2023.     She was recently admitted to [facility] (transferred form [facility] for sepsis related to her left hip)- there were concerns for for septic L hip joint.     Hospital course also notable for atrial fibrillation, MRSA bacteremia on admission. CT showed development of multiple fluid collections surrounding the left hip joint is noted measuring up to 9 cm in diameter with layering hyperdense fluid.  - s/p IR aspiration (MRSA on cultures), specimen was too dense for WBC count to be measured.     She went to OR 1/19/34: Operative documentation revealed - a large amount of purulence from the posterior aspect of the buttock, I&D of deep abscess left buttock and thigh, also purulence distal to the screw insertion site of hip screw. Screws were removed, antibiotic spacer placed, Excisional debridement of fascia and muscle left hip,     She went back to OR 1/25, this time on purulence noted,  serosanguineous fluid was noted instead, even upon opening fascial incision. Joint was visualized and repair was intact. TTE this hospitalization - adequate study, no veg. She also had painful blisters of her lip, treated empirically.     Of note she also has chronic lower back pain with spinal hardware, however her MRI during this admission (L spine) did not show signs of infection.     MRSA index blood culture positive 1/16  Bcx 1/18/24 NG.     She went to ED 2/06 for PICC line accidentally being removed, PICC replaced, HDS, no fevers, discharged home.     ID plan was to pursue six weeks of IV therapy with Daptomycin with EOT 2/29/24 (followed by oral abx for chronic suppression)    She is here with her partner today in clinic, they seem to endorse that she  may have missed several doses of anitbitoics, patient states she gets it when home health RN comes to her house, but now that her son has been trained by the RN, she will get her antibiotics everyday She continues to have a abrrera catheter in place that she mentions is supposed to be out soon. Continued chronic low back pain with some buttock cores endorsed.  Denies fever, diarrhea, dysuria, flank pain, cough, rigors. Continued pain over incision site since leaving [facility], had not gotten her wound vac dressing changed since leaving the hospital.Labs reviewed, hypoalbuminemia, CRP appears to be trending downwards, LFTs and Cr WNL, CPK WNL. No leukocytosis      Review of Systems   Constitutional: Positive for malaise/fatigue. Negative for chills, decreased appetite and fever.   HENT:  Negative for odynophagia.    Respiratory:  Negative for cough and shortness of breath.    Endocrine: Negative for polyuria.   Skin:  Negative for rash.   Musculoskeletal:  Positive for arthritis, back pain and joint pain. Negative for joint swelling and myalgias.   Gastrointestinal:  Negative for diarrhea, hematochezia and melena.   Genitourinary:  Negative for dysuria and hematuria.   Neurological:  Negative for headaches.   Psychiatric/Behavioral:  Negative for altered mental status.    Allergic/Immunologic: Negative for persistent infections.         Past Medical History:   Diagnosis Date    Anemia     Arthritis     Bleeding ulcer 07/2016    Chronic pain     DDD (degenerative disc disease), cervical     DDD (degenerative disc disease), lumbar     Depression     Disc degeneration, lumbosacral     Diverticulitis     Encounter for blood transfusion     Hypertension     IBS (irritable bowel syndrome)     Neuropathy of both feet     Seizures     none  since 2017    Umbilical hernia 2020     Past Surgical History:   Procedure Laterality Date    ARTHROPLASTY, HIP, GIRDLESTONE, POSTERIOR APPROACH Left 1/19/2024    Procedure: ARTHROPLASTY, HIP,  GIRDLESTONE, POSTERIOR APPROACH;  Surgeon: Bulmaro Tellez MD;  Location: [facility] OR 2ND FLR;  Service: Orthopedics;  Laterality: Left;    ARTHROPLASTY, HIP, GIRDLESTONE, POSTERIOR APPROACH Left 1/25/2024    Procedure: Irrigation and debridement left hip,;  Surgeon: Juanito Painting MD;  Location: [facility] OR 2ND FLR;  Service: Orthopedics;  Laterality: Left;    BACK SURGERY      BREAST BIOPSY      BREAST SURGERY Right     lumpectomy    CAUDAL EPIDURAL STEROID INJECTION N/A 01/28/2022    Procedure: Injection-steroid-epidural-caudal;  Surgeon: Luzmaria Marcelino MD;  Location: [facility] OR;  Service: Pain Management;  Laterality: N/A;    COLONOSCOPY N/A 01/14/2022    Procedure: COLONOSCOPY;  Surgeon: Abby Landers MD;  Location: [facility];  Service: Endoscopy;  Laterality: N/A;    ENDOSCOPIC ULTRASOUND OF UPPER GASTROINTESTINAL TRACT N/A 07/21/2020    Procedure: ULTRASOUND, UPPER GI TRACT, ENDOSCOPIC;  Surgeon: Marcio Nguyễn III, MD;  Location: [facility];  Service: Endoscopy;  Laterality: N/A;    ESOPHAGOGASTRODUODENOSCOPY N/A 01/14/2022    Procedure: EGD (ESOPHAGOGASTRODUODENOSCOPY);  Surgeon: Abby Landers MD;  Location: [facility];  Service: Endoscopy;  Laterality: N/A;    ESOPHAGOGASTRODUODENOSCOPY N/A 06/10/2022    Procedure: EGD (ESOPHAGOGASTRODUODENOSCOPY);  Surgeon: Abby Landers MD;  Location: [facility];  Service: Endoscopy;  Laterality: N/A;    EYE SURGERY      cataract    HYSTERECTOMY      INTRAMEDULLARY RODDING OF TROCHANTER OF FEMUR Left 12/11/2018    Procedure: INSERTION, INTRAMEDULLARY SANTOS, FEMUR, TROCHANTER;  Surgeon: Eulalio De La Cruz MD;  Location: [facility] OR;  Service: Orthopedics;  Laterality: Left;    IRRIGATION AND DEBRIDEMENT OF LOWER EXTREMITY Left 1/19/2024    Procedure: IRRIGATION AND DEBRIDEMENT, LOWER EXTREMITY;  Surgeon: Bulmaro Tellez MD;  Location: [facility] OR 2ND FLR;  Service: Orthopedics;  Laterality: Left;    REPAIR OF EPIGASTRIC HERNIA N/A 12/7/2023    Procedure:  REPAIR, HERNIA, EPIGASTRIC;  Surgeon: Vipul Escobar MD;  Location: [facility];  Service: General;  Laterality: N/A;    SPINAL CORD STIMULATOR IMPLANT  09/18/2013    and removal    SPINE SURGERY  2006    lumbar L2-S1 decompression.    SPINE SURGERY      cervical decompression    TONSILLECTOMY       Family History   Problem Relation Age of Onset    Diabetes Mother     Hypertension Mother     Irritable bowel syndrome Mother     Diabetes Father         insulin dependent    Hypertension Father     Heart disease Father     Coronary artery disease Father     Depression Father     Diabetes Sister     Diabetes Brother     COPD Brother      Social History     Tobacco Use    Smoking status: Never    Smokeless tobacco: Never   Substance Use Topics    Alcohol use: No    Drug use: No       Review of patient's allergies indicates:  No Known Allergies      Objective:   VS (24h): There were no vitals filed for this visit.      Physical Exam  Constitutional:       Appearance: She is not ill-appearing or toxic-appearing.      Comments: Wheel chair bound, limited ROM both lower extremities   HENT:      Head: Normocephalic and atraumatic.      Nose: Nose normal.      Mouth/Throat:      Mouth: Mucous membranes are moist.      Pharynx: Oropharynx is clear.   Eyes:      Conjunctiva/sclera: Conjunctivae normal.   Cardiovascular:      Rate and Rhythm: Normal rate and regular rhythm.      Pulses: Normal pulses.      Heart sounds: Normal heart sounds.   Pulmonary:      Effort: Pulmonary effort is normal.      Breath sounds: Normal breath sounds.   Abdominal:      General: Bowel sounds are normal.      Palpations: Abdomen is soft.      Tenderness: There is no guarding or rebound.   Musculoskeletal:         General: Tenderness and deformity present.      Cervical back: Neck supple.      Right lower leg: Edema present.      Left lower leg: Edema present.      Comments: L hip site: wound vac dressing on but device is turned off, and tubing was  disconnected, dressing appears intact and expected es-incisional tenderness, no leak or purulence.   Skin:     General: Skin is warm and dry.      Comments: RUE indwelling line c/d/i   Neurological:      Mental Status: She is alert and oriented to person, place, and time. Mental status is at baseline.           Labs:    Lab Results   Component Value Date    WBC 11.04 02/05/2024    HGB 7.0 (L) 02/05/2024    HCT 23.7 (L) 02/05/2024     (H) 02/05/2024    ALT 14 02/05/2024    AST 25 02/05/2024     02/05/2024    K 4.2 02/05/2024     02/05/2024    CREATININE 1.0 02/05/2024    BUN 14 02/05/2024    CO2 24 02/05/2024    TSH 0.240 (L) 12/02/2022    INR 1.1 11/30/2022    HGBA1C 5.2 12/01/2022        Micro:     See HPI    Immunization History   Administered Date(s) Administered    COVID-19, MRNA, LN-S, PF (Pfizer) (Purple Cap) 07/27/2021, 08/18/2021    Influenza (FLUAD) - Quadrivalent - Adjuvanted - PF *Preferred* (65+) 10/20/2021, 09/29/2023    Influenza - Quadrivalent - PF *Preferred* (6 months and older) 09/22/2011, 09/06/2018    PPD Test 12/13/2018    Pneumococcal Conjugate - 13 Valent 10/20/2021    Pneumococcal Conjugate - 20 Valent 09/29/2023    Td (ADULT) 12/05/2012         Assessment & Plan:   MRSA bacteremia  Staphylococcal arthritis of left hip  Tinea  -     nystatin (MYCOSTATIN) powder; Apply to affected area 3 times daily  Dispense: 30 g; Refill: 0  -     doxycycline (VIBRAMYCIN) 100 MG Cap; Take 1 capsule (100 mg total) by mouth every 12 (twelve) hours.  Dispense: 84 capsule; Refill: 0      78 year ofd female with hx of cephalomedullary nail fixation left proximal femur who was admitted recently to [facility] for septic joint-  s/p I&D and antibiotic spacer placement on 1/19/24 and repeat I&D on 1/25, aspiration cultures with MRSA, hospital course notable for MRSA bacteremia which she cleared quickly, TTE neg, on IV daptomycin for six weeks with OPAT. Patient refused SNF placement at the time of  "discharge.     She is wheelchair bound, is supposed to be toe-touch weight bearing, wound vac dressing on but device is turned off, and tubing was disconnected, dressing appears intact and expected es-incisional tenderness, no leak or purulence. I am worried about her not having the best chances of healing - recently PICC line "came out accidentally", and furthermore she reports several missed doses (was only administering anitbiotics when the RN came to help with home health). She now states her son will help administer abx daily.       I will re-start timeline for antibiotics and add doxycycline PO additionally in order to improve her changes of healing and to reduce risk of future infections. She acknowledges SNF would be the best option but refuses adamantly (had a bad experience prior).       -New EOT for Daptomycin 03/20/2024    -Start Doxycycline 100 mg PO BID today EOT 3/20/2024    -Orthopedic appt upcoming- will follow recs, will need wound vac changed/removed.     Follow up in 3 weeks    Haroldo Morfin MD   Infectious Disease Fellow      "

## 2024-02-08 NOTE — PROGRESS NOTES
I discussed concerns with home health care today. Very complex social situation, son is supposed to help with infusions everyday, prior to this she was note getting anitbitoics on a daily basis, despite her partner endorsing he sometimes helps. Both me and the home health RN are very doubtful about her remaining adherent moving forward. We will follow up with an update from home health in 1 week from now, if there are continued signs of non adherence, we may discharge her from OPAT altogether and/or ask her again to get admitted so she can go to a nursing home to receive daily anitbitoics. She is aware PO is not standard of care and does not give her the best chance at resolution of this infection, she is also aware that the best chance at healing would be a nursing facility with supervised care. She verbalized understanding to me in clinic earlier regarding this matter.

## 2024-02-08 NOTE — PROGRESS NOTES
Ms. Ray is here today for a post-operative visit after undergoing the following:    Arthrotomy left hip for septic arthritis   Incision and drainage left hip deep abscess     by Dr. Painting on 1/25/2024.    Interval History:  She reports that she has pain to the left lateral hip.  She states she has been following the weight-bearing restriction as given to her at the time of her surgery.  Denies any falls or injuries.  She reports her PICC line was accidentally pulled on Sunday and she missed her antibiotic dosing only on Sunday.  She would contacted the hospital and had the PICC line replaced on Monday February 5th.  She was seen in Infectious Disease earlier today and her antibiotic regimen was extended with a new targeted in date.  She also reports that her wound VAC has been off for the last 24 hours.  Denies any fever chills.  Her other issue is she would like to have her indwelling Boyd removed.  She was scheduled to see urology and [place] recently but missed her appointment.  Her  is going to reschedule this appointment after leaving the clinic visit today.    Physical exam:  The patient's presents to clinic with her spouse in a wheelchair.  Her wound VAC is no longer on and has been turned off.  Her dressing was removed.  She has staples present.  The staples are not yet ready to be removed and therefore left in place.  Site was cleaned with iodine and a Xeroform dressing followed by 4x4s covered with Tegaderm was applied.  No signs of infection noted.  See photos below  She has tactile stimulation to their lower leg, she denies calf pain, there is no leg edema and their pedal pulse is palpable x 2.       Above photo(s) was/were taken with verbal permission of patient and/or their representative.  The purpose of photo(s) is to aid in medical treatment plan.    RADS:  Left hip x-ray was obtained, findings shows the riding the femur appears to have backed out of the femoral head component when  "compared to prior x-rays.  No acute fracture seen.    Assessment:  Post-op visit (2 weeks)    Plan:    ICD-10-CM ICD-9-CM   1. Staphylococcal arthritis of left hip  M00.052 711.05     041.10   2. MRSA bacteremia  R78.81 790.7    B95.62 041.12   3. Painful orthopaedic hardware  T84.84XA 996.78   4. Post-operative state  Z98.890 V45.89     78F, prior L hip IMN 2018 w/ subsequent L hip AVN, L hip septic arthritis, L hip abscess  1.19.24 - I&D L hip, AMARA, L hip abx spacer  1.25.24 - I&D L hip    Per ID Note dated 2/8/24:  She is wheelchair bound, is supposed to be toe-touch weight bearing, wound vac dressing on but device is turned off, and tubing was disconnected, dressing appears intact and expected es-incisional tenderness, no leak or purulence. I am worried about her not having the best chances of healing - recently PICC line "came out accidentally", and furthermore she reports several missed doses (was only administering anitbiotics when the RN came to help with home health). She now states her son will help administer abx daily.         I will re-start timeline for antibiotics and add doxycycline PO additionally in order to improve her changes of healing and to reduce risk of future infections. She acknowledges SNF would be the best option but refuses adamantly (had a bad experience prior).         -New EOT for Daptomycin 03/20/2024     -Start Doxycycline 100 mg PO BID today EOT 3/20/2024    I discussed the x-ray findings with Dr. Tellez.  He reviewed her images and recommends she limit her weight bearing in her left leg and follow up in 1 week.  Additionally, he said if there is any rotational changes in her leg, she is to notify us immediately.      WOUND CARE ORDERS  - Do not remove surgical dressing. This will be done only by MD/RAFI at next visit. If dressing is completely saturated, Call number below.   - Do not get dressings wet.   - Do not shower.   - If dressing continues to be saturated or there are " signs of infection, please call Ortho Clinic [phone] for further instructions and to make appt to be seen.       PHYSICAL THERAPY:   - Physical therapy as ordered.  Patient is currently getting home health.  - Weight bearing foot flat TTWB LLE x 6 weeks.  - Range of motion Left posterior hip precautions for life.     PAIN MEDICATION:   - Pain medication: refill was not needed  - Pain medication refill policy provided to patient for review, yes.    - Patient was informed a multi-modal approach is used to treat their pain.     DVT PROPHYLAXIS:   - Eliquis 2.5 mg bid    FRAGILITY:  - Patient has not been referred to the fracture clinic.    BELLE:  - Patient instructed to follow up with urology for removal of belle.     FOLLOW UP:   - Patient will follow up in the clinic in 1 weeks.  - X-ray of her left hip is needed.  - I will remove her staple at next visit if skin allows.  - Future Appointments:   Future Appointments   Date Time Provider Department Center   2/14/2024  9:30 AM Alphonse Boothe III, MD [facility]   2/15/2024  3:00 PM Sreedhar Carlos, NP [facility] ORTHO [address]   2/29/2024 10:00 AM Haroldo Morfin MD [facility] [place]   3/7/2024 12:30 PM Sreedhar Carlos, NP [facility] ORTHO [address]       If there are any questions prior to scheduled follow up, the patient was instructed to contact the office

## 2024-02-12 ENCOUNTER — LAB VISIT (OUTPATIENT)
Dept: LAB | Facility: HOSPITAL | Age: 79
End: 2024-02-12
Attending: INTERNAL MEDICINE
Payer: MEDICARE

## 2024-02-12 ENCOUNTER — DOCUMENT SCAN (OUTPATIENT)
Dept: HOME HEALTH SERVICES | Facility: HOSPITAL | Age: 79
End: 2024-02-12
Payer: MEDICARE

## 2024-02-12 ENCOUNTER — TELEPHONE (OUTPATIENT)
Dept: INFECTIOUS DISEASES | Facility: CLINIC | Age: 79
End: 2024-02-12
Payer: MEDICARE

## 2024-02-12 DIAGNOSIS — Z48.815 ENCOUNTER FOR SURGICAL AFTERCARE FOLLOWING SURGERY OF DIGESTIVE SYSTEM: ICD-10-CM

## 2024-02-12 DIAGNOSIS — A41.2: Primary | ICD-10-CM

## 2024-02-12 LAB
ALBUMIN SERPL BCP-MCNC: 1.4 G/DL (ref 3.5–5.2)
ALP SERPL-CCNC: 146 U/L (ref 55–135)
ALT SERPL W/O P-5'-P-CCNC: 14 U/L (ref 10–44)
ANION GAP SERPL CALC-SCNC: 8 MMOL/L (ref 8–16)
AST SERPL-CCNC: 30 U/L (ref 10–40)
BASOPHILS # BLD AUTO: 0.07 K/UL (ref 0–0.2)
BASOPHILS NFR BLD: 0.9 % (ref 0–1.9)
BILIRUB SERPL-MCNC: 0.2 MG/DL (ref 0.1–1)
BUN SERPL-MCNC: 14 MG/DL (ref 8–23)
CALCIUM SERPL-MCNC: 7.6 MG/DL (ref 8.7–10.5)
CHLORIDE SERPL-SCNC: 104 MMOL/L (ref 95–110)
CK SERPL-CCNC: 241 U/L (ref 20–180)
CO2 SERPL-SCNC: 27 MMOL/L (ref 23–29)
CREAT SERPL-MCNC: 0.9 MG/DL (ref 0.5–1.4)
CRP SERPL-MCNC: 75.2 MG/L (ref 0–8.2)
DIFFERENTIAL METHOD BLD: ABNORMAL
EOSINOPHIL # BLD AUTO: 0.2 K/UL (ref 0–0.5)
EOSINOPHIL NFR BLD: 2.6 % (ref 0–8)
ERYTHROCYTE [DISTWIDTH] IN BLOOD BY AUTOMATED COUNT: 19.9 % (ref 11.5–14.5)
EST. GFR  (NO RACE VARIABLE): >60 ML/MIN/1.73 M^2
GLUCOSE SERPL-MCNC: 97 MG/DL (ref 70–110)
HCT VFR BLD AUTO: 22.5 % (ref 37–48.5)
HGB BLD-MCNC: 6.7 G/DL (ref 12–16)
IMM GRANULOCYTES # BLD AUTO: 0.04 K/UL (ref 0–0.04)
IMM GRANULOCYTES NFR BLD AUTO: 0.5 % (ref 0–0.5)
LYMPHOCYTES # BLD AUTO: 1.1 K/UL (ref 1–4.8)
LYMPHOCYTES NFR BLD: 13.9 % (ref 18–48)
MCH RBC QN AUTO: 27.9 PG (ref 27–31)
MCHC RBC AUTO-ENTMCNC: 29.8 G/DL (ref 32–36)
MCV RBC AUTO: 94 FL (ref 82–98)
MONOCYTES # BLD AUTO: 0.5 K/UL (ref 0.3–1)
MONOCYTES NFR BLD: 6.3 % (ref 4–15)
NEUTROPHILS # BLD AUTO: 6.1 K/UL (ref 1.8–7.7)
NEUTROPHILS NFR BLD: 75.8 % (ref 38–73)
NRBC BLD-RTO: 0 /100 WBC
PLATELET # BLD AUTO: 477 K/UL (ref 150–450)
PMV BLD AUTO: 9.6 FL (ref 9.2–12.9)
POTASSIUM SERPL-SCNC: 4 MMOL/L (ref 3.5–5.1)
PROT SERPL-MCNC: 5.4 G/DL (ref 6–8.4)
RBC # BLD AUTO: 2.4 M/UL (ref 4–5.4)
SODIUM SERPL-SCNC: 139 MMOL/L (ref 136–145)
WBC # BLD AUTO: 8 K/UL (ref 3.9–12.7)

## 2024-02-12 PROCEDURE — 80053 COMPREHEN METABOLIC PANEL: CPT | Performed by: INTERNAL MEDICINE

## 2024-02-12 PROCEDURE — 82550 ASSAY OF CK (CPK): CPT | Performed by: INTERNAL MEDICINE

## 2024-02-12 PROCEDURE — 86140 C-REACTIVE PROTEIN: CPT | Performed by: INTERNAL MEDICINE

## 2024-02-12 PROCEDURE — 36415 COLL VENOUS BLD VENIPUNCTURE: CPT | Performed by: INTERNAL MEDICINE

## 2024-02-12 PROCEDURE — 85025 COMPLETE CBC W/AUTO DIFF WBC: CPT | Performed by: INTERNAL MEDICINE

## 2024-02-12 NOTE — TELEPHONE ENCOUNTER
Hemoglobin 6.7    Called pt, spoke to her son, Aristeo  Gave Dr. Armstrong's recommendations to go to the ED for a blood transfusion.   He verbalized understanding.

## 2024-02-14 ENCOUNTER — HOSPITAL ENCOUNTER (INPATIENT)
Facility: HOSPITAL | Age: 79
LOS: 6 days | Discharge: HOME HEALTH | DRG: 481 | End: 2024-02-20
Attending: ORTHOPAEDIC SURGERY | Admitting: ORTHOPAEDIC SURGERY
Payer: MEDICARE

## 2024-02-14 ENCOUNTER — HOSPITAL ENCOUNTER (EMERGENCY)
Facility: HOSPITAL | Age: 79
Discharge: ANOTHER HOSPITAL | End: 2024-02-14
Attending: EMERGENCY MEDICINE
Payer: MEDICARE

## 2024-02-14 VITALS
SYSTOLIC BLOOD PRESSURE: 147 MMHG | RESPIRATION RATE: 18 BRPM | DIASTOLIC BLOOD PRESSURE: 65 MMHG | BODY MASS INDEX: 24.05 KG/M2 | TEMPERATURE: 99 F | OXYGEN SATURATION: 96 % | WEIGHT: 149 LBS | HEART RATE: 88 BPM

## 2024-02-14 DIAGNOSIS — S72.002G: ICD-10-CM

## 2024-02-14 DIAGNOSIS — Z98.890 STATUS POST GIRDLESTONE PROCEDURE: Primary | ICD-10-CM

## 2024-02-14 DIAGNOSIS — R52 PAIN: ICD-10-CM

## 2024-02-14 DIAGNOSIS — A41.02 SEPSIS DUE TO METHICILLIN RESISTANT STAPHYLOCOCCUS AUREUS (MRSA) WITHOUT ACUTE ORGAN DYSFUNCTION: ICD-10-CM

## 2024-02-14 DIAGNOSIS — S72.002A CLOSED FRACTURE OF LEFT HIP, INITIAL ENCOUNTER: Primary | ICD-10-CM

## 2024-02-14 DIAGNOSIS — Z01.810 PREOP CARDIOVASCULAR EXAM: ICD-10-CM

## 2024-02-14 PROBLEM — M00.052 STAPHYLOCOCCAL ARTHRITIS OF LEFT HIP: Chronic | Status: ACTIVE | Noted: 2024-01-18

## 2024-02-14 PROBLEM — Z01.818 PREOPERATIVE EXAMINATION: Status: ACTIVE | Noted: 2024-02-14

## 2024-02-14 PROBLEM — R33.9 URINARY RETENTION: Chronic | Status: ACTIVE | Noted: 2024-01-29

## 2024-02-14 PROBLEM — S72.92XA FEMUR FRACTURE, LEFT: Status: ACTIVE | Noted: 2018-12-11

## 2024-02-14 LAB
25(OH)D3+25(OH)D2 SERPL-MCNC: 9 NG/ML (ref 30–96)
ALBUMIN SERPL BCP-MCNC: 2 G/DL (ref 3.5–5.2)
ALP SERPL-CCNC: 137 U/L (ref 55–135)
ALT SERPL W/O P-5'-P-CCNC: 11 U/L (ref 10–44)
ANION GAP SERPL CALC-SCNC: 5 MMOL/L (ref 8–16)
AST SERPL-CCNC: 20 U/L (ref 10–40)
BACTERIA #/AREA URNS AUTO: ABNORMAL /HPF
BASOPHILS # BLD AUTO: 0.06 K/UL (ref 0–0.2)
BASOPHILS NFR BLD: 0.6 % (ref 0–1.9)
BILIRUB SERPL-MCNC: 0.3 MG/DL (ref 0.1–1)
BILIRUB UR QL STRIP: NEGATIVE
BUN SERPL-MCNC: 13 MG/DL (ref 8–23)
CALCIUM SERPL-MCNC: 7.4 MG/DL (ref 8.7–10.5)
CHLORIDE SERPL-SCNC: 105 MMOL/L (ref 95–110)
CLARITY UR REFRACT.AUTO: ABNORMAL
CO2 SERPL-SCNC: 28 MMOL/L (ref 23–29)
COLOR UR AUTO: YELLOW
CREAT SERPL-MCNC: 0.8 MG/DL (ref 0.5–1.4)
CRP SERPL-MCNC: 103.8 MG/L (ref 0–8.2)
DIFFERENTIAL METHOD BLD: ABNORMAL
EOSINOPHIL # BLD AUTO: 0.3 K/UL (ref 0–0.5)
EOSINOPHIL NFR BLD: 3.1 % (ref 0–8)
ERYTHROCYTE [DISTWIDTH] IN BLOOD BY AUTOMATED COUNT: 19.8 % (ref 11.5–14.5)
EST. GFR  (NO RACE VARIABLE): >60 ML/MIN/1.73 M^2
ESTIMATED AVG GLUCOSE: 91 MG/DL (ref 68–131)
GLUCOSE SERPL-MCNC: 91 MG/DL (ref 70–110)
GLUCOSE UR QL STRIP: NEGATIVE
HBA1C MFR BLD: 4.8 % (ref 4–5.6)
HCT VFR BLD AUTO: 23.8 % (ref 37–48.5)
HGB BLD-MCNC: 7.2 G/DL (ref 12–16)
HGB UR QL STRIP: NEGATIVE
IMM GRANULOCYTES # BLD AUTO: 0.06 K/UL (ref 0–0.04)
IMM GRANULOCYTES NFR BLD AUTO: 0.6 % (ref 0–0.5)
INR PPP: 1 (ref 0.8–1.2)
KETONES UR QL STRIP: NEGATIVE
LEUKOCYTE ESTERASE UR QL STRIP: ABNORMAL
LYMPHOCYTES # BLD AUTO: 0.9 K/UL (ref 1–4.8)
LYMPHOCYTES NFR BLD: 8.8 % (ref 18–48)
MAGNESIUM SERPL-MCNC: 1.6 MG/DL (ref 1.6–2.6)
MCH RBC QN AUTO: 28.3 PG (ref 27–31)
MCHC RBC AUTO-ENTMCNC: 30.3 G/DL (ref 32–36)
MCV RBC AUTO: 94 FL (ref 82–98)
MICROSCOPIC COMMENT: ABNORMAL
MONOCYTES # BLD AUTO: 0.6 K/UL (ref 0.3–1)
MONOCYTES NFR BLD: 5.6 % (ref 4–15)
NEUTROPHILS # BLD AUTO: 8.2 K/UL (ref 1.8–7.7)
NEUTROPHILS NFR BLD: 81.3 % (ref 38–73)
NITRITE UR QL STRIP: NEGATIVE
NON-SQ EPI CELLS #/AREA URNS AUTO: 3 /HPF
NRBC BLD-RTO: 0 /100 WBC
PH UR STRIP: 6 [PH] (ref 5–8)
PHOSPHATE SERPL-MCNC: 2.3 MG/DL (ref 2.7–4.5)
PLATELET # BLD AUTO: 518 K/UL (ref 150–450)
PMV BLD AUTO: 9.2 FL (ref 9.2–12.9)
POTASSIUM SERPL-SCNC: 4 MMOL/L (ref 3.5–5.1)
PROT SERPL-MCNC: 5.8 G/DL (ref 6–8.4)
PROT UR QL STRIP: NEGATIVE
PROTHROMBIN TIME: 11.6 SEC (ref 9–12.5)
RBC # BLD AUTO: 2.54 M/UL (ref 4–5.4)
RBC #/AREA URNS AUTO: 1 /HPF (ref 0–4)
SODIUM SERPL-SCNC: 138 MMOL/L (ref 136–145)
SP GR UR STRIP: 1.01 (ref 1–1.03)
TRANSFERRIN SERPL-MCNC: 92 MG/DL (ref 200–375)
URN SPEC COLLECT METH UR: ABNORMAL
WBC # BLD AUTO: 10.03 K/UL (ref 3.9–12.7)
WBC #/AREA URNS AUTO: 53 /HPF (ref 0–5)

## 2024-02-14 PROCEDURE — 84466 ASSAY OF TRANSFERRIN: CPT

## 2024-02-14 PROCEDURE — 87077 CULTURE AEROBIC IDENTIFY: CPT

## 2024-02-14 PROCEDURE — 96376 TX/PRO/DX INJ SAME DRUG ADON: CPT

## 2024-02-14 PROCEDURE — 87088 URINE BACTERIA CULTURE: CPT

## 2024-02-14 PROCEDURE — 84100 ASSAY OF PHOSPHORUS: CPT

## 2024-02-14 PROCEDURE — 63600175 PHARM REV CODE 636 W HCPCS

## 2024-02-14 PROCEDURE — 87040 BLOOD CULTURE FOR BACTERIA: CPT

## 2024-02-14 PROCEDURE — 87186 SC STD MICRODIL/AGAR DIL: CPT

## 2024-02-14 PROCEDURE — 93010 ELECTROCARDIOGRAM REPORT: CPT | Mod: ,,, | Performed by: INTERNAL MEDICINE

## 2024-02-14 PROCEDURE — 93005 ELECTROCARDIOGRAM TRACING: CPT

## 2024-02-14 PROCEDURE — 63600175 PHARM REV CODE 636 W HCPCS: Performed by: EMERGENCY MEDICINE

## 2024-02-14 PROCEDURE — 63600175 PHARM REV CODE 636 W HCPCS: Mod: JZ,JG | Performed by: EMERGENCY MEDICINE

## 2024-02-14 PROCEDURE — 85610 PROTHROMBIN TIME: CPT | Performed by: EMERGENCY MEDICINE

## 2024-02-14 PROCEDURE — 96365 THER/PROPH/DIAG IV INF INIT: CPT

## 2024-02-14 PROCEDURE — 25000003 PHARM REV CODE 250

## 2024-02-14 PROCEDURE — 84134 ASSAY OF PREALBUMIN: CPT

## 2024-02-14 PROCEDURE — 83036 HEMOGLOBIN GLYCOSYLATED A1C: CPT

## 2024-02-14 PROCEDURE — 96375 TX/PRO/DX INJ NEW DRUG ADDON: CPT

## 2024-02-14 PROCEDURE — 83735 ASSAY OF MAGNESIUM: CPT

## 2024-02-14 PROCEDURE — 85025 COMPLETE CBC W/AUTO DIFF WBC: CPT | Performed by: EMERGENCY MEDICINE

## 2024-02-14 PROCEDURE — 82306 VITAMIN D 25 HYDROXY: CPT

## 2024-02-14 PROCEDURE — 11000001 HC ACUTE MED/SURG PRIVATE ROOM

## 2024-02-14 PROCEDURE — 81001 URINALYSIS AUTO W/SCOPE: CPT

## 2024-02-14 PROCEDURE — 51702 INSERT TEMP BLADDER CATH: CPT

## 2024-02-14 PROCEDURE — 87086 URINE CULTURE/COLONY COUNT: CPT

## 2024-02-14 PROCEDURE — 86140 C-REACTIVE PROTEIN: CPT

## 2024-02-14 PROCEDURE — 99285 EMERGENCY DEPT VISIT HI MDM: CPT | Mod: 25

## 2024-02-14 PROCEDURE — 25000003 PHARM REV CODE 250: Performed by: EMERGENCY MEDICINE

## 2024-02-14 PROCEDURE — 86850 RBC ANTIBODY SCREEN: CPT

## 2024-02-14 PROCEDURE — 80053 COMPREHEN METABOLIC PANEL: CPT | Performed by: EMERGENCY MEDICINE

## 2024-02-14 RX ORDER — OXYCODONE AND ACETAMINOPHEN 5; 325 MG/1; MG/1
1 TABLET ORAL
Status: COMPLETED | OUTPATIENT
Start: 2024-02-14 | End: 2024-02-14

## 2024-02-14 RX ORDER — OXYCODONE HYDROCHLORIDE 10 MG/1
10 TABLET ORAL EVERY 6 HOURS PRN
Status: DISCONTINUED | OUTPATIENT
Start: 2024-02-14 | End: 2024-02-15

## 2024-02-14 RX ORDER — ESCITALOPRAM OXALATE 10 MG/1
20 TABLET ORAL NIGHTLY
Status: DISCONTINUED | OUTPATIENT
Start: 2024-02-14 | End: 2024-02-14 | Stop reason: HOSPADM

## 2024-02-14 RX ORDER — OXYCODONE HYDROCHLORIDE 5 MG/1
5 TABLET ORAL EVERY 6 HOURS PRN
Status: DISCONTINUED | OUTPATIENT
Start: 2024-02-14 | End: 2024-02-15

## 2024-02-14 RX ORDER — MUPIROCIN 20 MG/G
1 OINTMENT TOPICAL 2 TIMES DAILY
Status: CANCELLED | OUTPATIENT
Start: 2024-02-15 | End: 2024-02-20

## 2024-02-14 RX ORDER — NYSTATIN 100000 [USP'U]/G
POWDER TOPICAL 3 TIMES DAILY
Status: DISCONTINUED | OUTPATIENT
Start: 2024-02-15 | End: 2024-02-20 | Stop reason: HOSPADM

## 2024-02-14 RX ORDER — SODIUM CHLORIDE 0.9 % (FLUSH) 0.9 %
10 SYRINGE (ML) INJECTION
Status: DISCONTINUED | OUTPATIENT
Start: 2024-02-15 | End: 2024-02-20 | Stop reason: HOSPADM

## 2024-02-14 RX ORDER — CYCLOBENZAPRINE HCL 10 MG
10 TABLET ORAL 3 TIMES DAILY PRN
Status: DISCONTINUED | OUTPATIENT
Start: 2024-02-14 | End: 2024-02-20 | Stop reason: HOSPADM

## 2024-02-14 RX ORDER — ACETAMINOPHEN 500 MG
1000 TABLET ORAL EVERY 6 HOURS
Status: DISCONTINUED | OUTPATIENT
Start: 2024-02-15 | End: 2024-02-15

## 2024-02-14 RX ORDER — DOXYCYCLINE HYCLATE 100 MG
100 TABLET ORAL EVERY 12 HOURS
Status: DISCONTINUED | OUTPATIENT
Start: 2024-02-14 | End: 2024-02-15

## 2024-02-14 RX ORDER — PANTOPRAZOLE SODIUM 40 MG/1
40 TABLET, DELAYED RELEASE ORAL
Status: DISCONTINUED | OUTPATIENT
Start: 2024-02-15 | End: 2024-02-14 | Stop reason: HOSPADM

## 2024-02-14 RX ORDER — HYDROMORPHONE HYDROCHLORIDE 1 MG/ML
1 INJECTION, SOLUTION INTRAMUSCULAR; INTRAVENOUS; SUBCUTANEOUS
Status: COMPLETED | OUTPATIENT
Start: 2024-02-14 | End: 2024-02-14

## 2024-02-14 RX ORDER — BISACODYL 10 MG/1
10 SUPPOSITORY RECTAL DAILY PRN
Status: DISCONTINUED | OUTPATIENT
Start: 2024-02-15 | End: 2024-02-20 | Stop reason: HOSPADM

## 2024-02-14 RX ORDER — AMLODIPINE AND BENAZEPRIL HYDROCHLORIDE 5; 20 MG/1; MG/1
1 CAPSULE ORAL DAILY
Status: DISCONTINUED | OUTPATIENT
Start: 2024-02-15 | End: 2024-02-16

## 2024-02-14 RX ORDER — ONDANSETRON HYDROCHLORIDE 2 MG/ML
4 INJECTION, SOLUTION INTRAVENOUS EVERY 12 HOURS PRN
Status: DISCONTINUED | OUTPATIENT
Start: 2024-02-15 | End: 2024-02-20 | Stop reason: HOSPADM

## 2024-02-14 RX ORDER — ESCITALOPRAM OXALATE 20 MG/1
20 TABLET ORAL NIGHTLY
Status: DISCONTINUED | OUTPATIENT
Start: 2024-02-14 | End: 2024-02-20 | Stop reason: HOSPADM

## 2024-02-14 RX ORDER — SODIUM CHLORIDE 9 MG/ML
INJECTION, SOLUTION INTRAVENOUS CONTINUOUS
Status: ACTIVE | OUTPATIENT
Start: 2024-02-14 | End: 2024-02-15

## 2024-02-14 RX ORDER — CYCLOBENZAPRINE HCL 10 MG
10 TABLET ORAL 3 TIMES DAILY PRN
Status: DISCONTINUED | OUTPATIENT
Start: 2024-02-14 | End: 2024-02-14 | Stop reason: HOSPADM

## 2024-02-14 RX ORDER — PREGABALIN 75 MG/1
75 CAPSULE ORAL NIGHTLY
Status: DISCONTINUED | OUTPATIENT
Start: 2024-02-15 | End: 2024-02-14

## 2024-02-14 RX ORDER — LIDOCAINE HYDROCHLORIDE 10 MG/ML
1 INJECTION, SOLUTION EPIDURAL; INFILTRATION; INTRACAUDAL; PERINEURAL
Status: CANCELLED | OUTPATIENT
Start: 2024-02-14

## 2024-02-14 RX ORDER — METOPROLOL TARTRATE 50 MG/1
50 TABLET ORAL 2 TIMES DAILY
Status: DISCONTINUED | OUTPATIENT
Start: 2024-02-14 | End: 2024-02-16

## 2024-02-14 RX ORDER — TALC
6 POWDER (GRAM) TOPICAL NIGHTLY PRN
Status: DISCONTINUED | OUTPATIENT
Start: 2024-02-15 | End: 2024-02-20 | Stop reason: HOSPADM

## 2024-02-14 RX ORDER — DOCUSATE SODIUM 100 MG/1
100 CAPSULE, LIQUID FILLED ORAL 2 TIMES DAILY
Status: DISCONTINUED | OUTPATIENT
Start: 2024-02-14 | End: 2024-02-20 | Stop reason: HOSPADM

## 2024-02-14 RX ORDER — MUPIROCIN 20 MG/G
1 OINTMENT TOPICAL
Status: CANCELLED | OUTPATIENT
Start: 2024-02-14

## 2024-02-14 RX ORDER — MORPHINE SULFATE 2 MG/ML
2 INJECTION, SOLUTION INTRAMUSCULAR; INTRAVENOUS EVERY 6 HOURS PRN
Status: DISCONTINUED | OUTPATIENT
Start: 2024-02-14 | End: 2024-02-15

## 2024-02-14 RX ADMIN — OXYCODONE HYDROCHLORIDE AND ACETAMINOPHEN 1 TABLET: 5; 325 TABLET ORAL at 05:02

## 2024-02-14 RX ADMIN — CYCLOBENZAPRINE 10 MG: 10 TABLET, FILM COATED ORAL at 11:02

## 2024-02-14 RX ADMIN — HYDROMORPHONE HYDROCHLORIDE 1 MG: 1 INJECTION, SOLUTION INTRAMUSCULAR; INTRAVENOUS; SUBCUTANEOUS at 02:02

## 2024-02-14 RX ADMIN — ESCITALOPRAM OXALATE 20 MG: 20 TABLET ORAL at 10:02

## 2024-02-14 RX ADMIN — DOXYCYCLINE HYCLATE 100 MG: 100 TABLET, COATED ORAL at 10:02

## 2024-02-14 RX ADMIN — HYDROMORPHONE HYDROCHLORIDE 1 MG: 1 INJECTION, SOLUTION INTRAMUSCULAR; INTRAVENOUS; SUBCUTANEOUS at 08:02

## 2024-02-14 RX ADMIN — METOPROLOL TARTRATE 50 MG: 50 TABLET, FILM COATED ORAL at 10:02

## 2024-02-14 RX ADMIN — OXYCODONE HYDROCHLORIDE 10 MG: 10 TABLET ORAL at 10:02

## 2024-02-14 RX ADMIN — CYCLOBENZAPRINE 10 MG: 10 TABLET, FILM COATED ORAL at 05:02

## 2024-02-14 RX ADMIN — DOCUSATE SODIUM 100 MG: 100 CAPSULE, LIQUID FILLED ORAL at 10:02

## 2024-02-14 RX ADMIN — DAPTOMYCIN 675 MG: 500 INJECTION, POWDER, LYOPHILIZED, FOR SOLUTION INTRAVENOUS at 04:02

## 2024-02-14 RX ADMIN — HYDROMORPHONE HYDROCHLORIDE 1 MG: 1 INJECTION, SOLUTION INTRAMUSCULAR; INTRAVENOUS; SUBCUTANEOUS at 10:02

## 2024-02-14 RX ADMIN — HYDROMORPHONE HYDROCHLORIDE 1 MG: 1 INJECTION, SOLUTION INTRAMUSCULAR; INTRAVENOUS; SUBCUTANEOUS at 11:02

## 2024-02-14 RX ADMIN — ACETAMINOPHEN 1000 MG: 500 TABLET ORAL at 11:02

## 2024-02-14 RX ADMIN — MORPHINE SULFATE 2 MG: 2 INJECTION, SOLUTION INTRAMUSCULAR; INTRAVENOUS at 11:02

## 2024-02-14 NOTE — ED NOTES
Pt. And  updated on plan of care. Pt. Resting in bed with  at bedside. Chest rise and fall noted. Bed locked and in lowest position. Side rails raised x 2. Pt. Instructed to call for assistance, call light left in reach.

## 2024-02-14 NOTE — ED NOTES
Froilan Ray presents to the ED for a fall. Pt. Stated the fall happened roughly an hour pta. Pt. Is complaining of 10/10 pain to the left hip. Per EMS Pt. Has had a previous hip surgery that became infectious. Dressing to wound is clean, dry, and intact on the left hip. Pt. Identifiers checked and are accurate with Froilan Ray. Bed is locked and in lowest position. Side rails raised x 2. Call light left in reach and patient instructed to call for assistance.

## 2024-02-14 NOTE — ED PROVIDER NOTES
Encounter Date: 2/14/2024       History     Chief Complaint   Patient presents with    Fall     Pt. Fell this morning stated it happened about an hour pta. C/o left hip pain where previous hip surgery occurred      78-year-old female with previous left hip surgery.  Patient on IV antibiotics via PICC line.  Patient had a washout of the left hip wound as he had infected and now is healing.  This morning as she was morning felt like she shifted to the left and fell on her left side and landed on the surgical site and having pain in that area.  Patient denies any other complaints.  Denies fever chills or nausea vomiting or chest pain or shortness of breath      Review of patient's allergies indicates:  No Known Allergies  Past Medical History:   Diagnosis Date    Anemia     Arthritis     Bleeding ulcer 07/2016    Chronic pain     DDD (degenerative disc disease), cervical     DDD (degenerative disc disease), lumbar     Depression     Disc degeneration, lumbosacral     Diverticulitis     Encounter for blood transfusion     Hypertension     IBS (irritable bowel syndrome)     Neuropathy of both feet     Seizures     none  since 2017    Umbilical hernia 2020     Past Surgical History:   Procedure Laterality Date    ARTHROPLASTY, HIP, GIRDLESTONE, POSTERIOR APPROACH Left 1/19/2024    Procedure: ARTHROPLASTY, HIP, GIRDLESTONE, POSTERIOR APPROACH;  Surgeon: Bulmaro Tellez MD;  Location: [address];  Service: Orthopedics;  Laterality: Left;    ARTHROPLASTY, HIP, GIRDLESTONE, POSTERIOR APPROACH Left 1/25/2024    Procedure: Irrigation and debridement left hip,;  Surgeon: Juanito Painting MD;  Location: [address];  Service: Orthopedics;  Laterality: Left;    BACK SURGERY      BREAST BIOPSY      BREAST SURGERY Right     lumpectomy    CAUDAL EPIDURAL STEROID INJECTION N/A 01/28/2022    Procedure: Injection-steroid-epidural-caudal;  Surgeon: Luzmaria Marcelino MD;  Location: [facility] OR;  Service: Pain Management;   Laterality: N/A;    COLONOSCOPY N/A 01/14/2022    Procedure: COLONOSCOPY;  Surgeon: Abby Landers MD;  Location: [facility] ENDO;  Service: Endoscopy;  Laterality: N/A;    ENDOSCOPIC ULTRASOUND OF UPPER GASTROINTESTINAL TRACT N/A 07/21/2020    Procedure: ULTRASOUND, UPPER GI TRACT, ENDOSCOPIC;  Surgeon: Marcio Nguyễn III, MD;  Location: [facility] ENDO;  Service: Endoscopy;  Laterality: N/A;    ESOPHAGOGASTRODUODENOSCOPY N/A 01/14/2022    Procedure: EGD (ESOPHAGOGASTRODUODENOSCOPY);  Surgeon: Abby Landers MD;  Location: [facility] ENDO;  Service: Endoscopy;  Laterality: N/A;    ESOPHAGOGASTRODUODENOSCOPY N/A 06/10/2022    Procedure: EGD (ESOPHAGOGASTRODUODENOSCOPY);  Surgeon: Abby Landers MD;  Location: [facility] ENDO;  Service: Endoscopy;  Laterality: N/A;    EYE SURGERY      cataract    HYSTERECTOMY      INTRAMEDULLARY RODDING OF TROCHANTER OF FEMUR Left 12/11/2018    Procedure: INSERTION, INTRAMEDULLARY SANTOS, FEMUR, TROCHANTER;  Surgeon: Eulalio De La Cruz MD;  Location: [facility];  Service: Orthopedics;  Laterality: Left;    IRRIGATION AND DEBRIDEMENT OF LOWER EXTREMITY Left 1/19/2024    Procedure: IRRIGATION AND DEBRIDEMENT, LOWER EXTREMITY;  Surgeon: Bulmaro Tellez MD;  Location: [address];  Service: Orthopedics;  Laterality: Left;    REPAIR OF EPIGASTRIC HERNIA N/A 12/7/2023    Procedure: REPAIR, HERNIA, EPIGASTRIC;  Surgeon: Vipul Escobar MD;  Location: [facility] OR;  Service: General;  Laterality: N/A;    SPINAL CORD STIMULATOR IMPLANT  09/18/2013    and removal    SPINE SURGERY  2006    lumbar L2-S1 decompression.    SPINE SURGERY      cervical decompression    TONSILLECTOMY       Family History   Problem Relation Age of Onset    Diabetes Mother     Hypertension Mother     Irritable bowel syndrome Mother     Diabetes Father         insulin dependent    Hypertension Father     Heart disease Father     Coronary artery disease Father     Depression Father     Diabetes Sister     Diabetes Brother      COPD Brother      Social History     Tobacco Use    Smoking status: Never    Smokeless tobacco: Never   Substance Use Topics    Alcohol use: No    Drug use: No     Review of Systems   Constitutional: Negative.    HENT: Negative.     Eyes: Negative.    Respiratory: Negative.     Cardiovascular: Negative.  Negative for chest pain.   Gastrointestinal: Negative.    Endocrine: Negative.    Genitourinary: Negative.    Musculoskeletal:  Positive for arthralgias.        Left hip pain   Skin: Negative.    Allergic/Immunologic: Negative.    Neurological: Negative.    Hematological: Negative.    Psychiatric/Behavioral: Negative.     All other systems reviewed and are negative.      Physical Exam     Initial Vitals [02/14/24 0951]   BP Pulse Resp Temp SpO2   138/63 101 20 98.7 °F (37.1 °C) 98 %      MAP       --         Physical Exam    Nursing note and vitals reviewed.  Constitutional: She appears well-developed and well-nourished.   HENT:   Head: Normocephalic and atraumatic.   Nose: Nose normal.   Mouth/Throat: Oropharynx is clear and moist.   Eyes: Conjunctivae and EOM are normal. Pupils are equal, round, and reactive to light.   Neck: Neck supple. No thyromegaly present. No tracheal deviation present. No JVD present.   Normal range of motion.  Cardiovascular:  Normal rate, regular rhythm, normal heart sounds and intact distal pulses.           No murmur heard.  Pulmonary/Chest: Breath sounds normal. No stridor. No respiratory distress. She has no wheezes. She has no rales.   Abdominal: Abdomen is soft. Bowel sounds are normal. She exhibits no distension. There is no abdominal tenderness.   Musculoskeletal:         General: Tenderness present. No edema. Normal range of motion.      Cervical back: Normal range of motion and neck supple.      Comments: Left hip region tender to palpation.  Surgical site healing appropriately with staples in place and dressing intact.  Infection appears to be improving and wound healing.   Extremities neurovascularly intact.     Neurological: She is alert and oriented to person, place, and time.   Skin: Skin is warm. Capillary refill takes less than 2 seconds.   Psychiatric: She has a normal mood and affect. Thought content normal.         ED Course   Procedures  Labs Reviewed   CBC W/ AUTO DIFFERENTIAL   COMPREHENSIVE METABOLIC PANEL   PROTIME-INR          Imaging Results              X-Ray Hip 2 or 3 views Left (with Pelvis when performed) (In process)                      CT Pelvis Without Contrast (Final result)  Result time 02/14/24 10:51:16      Final result by Bryan Stevens MD (02/14/24 10:51:16)                   Narrative:    CMS MANDATED QUALITY DATA - CT RADIATION - 436    All CT scans at this facility utilize dose modulation, iterative reconstruction, and/or weight based dosing when appropriate to reduce radiation dose to as low as reasonably achievable.  Unless otherwise stated, incidental findings do not require dedicated follow-up imaging.        Reason: Pelvic fracture Fall (Pt. Fell this morning stated it happened about an hour pta. C/o left hip pain where previous hip surgery occurred ), lt hip arthroplasty 1/25/2024    TECHNIQUE: Pelvis CT without IV contrast.    Comparison: Left hip radiographs 1/19/2024    Findings:  Screw involving junction of the left femoral head and neck components of left hip hemiarthroplasty has fractured in dislocated almost 3 cm superiorly, lying along lateral aspect of superolateral left acetabular roof. Left femoral head component remains within the left acetabulum. Remainder of the proximal femoral component is otherwise intact. Tract from previous intramedullary nail and interlocking screws lie in proximal left femur.    Old healed fractures affect the right pubic body, and left superior pubic and inferior pubic rami, unchanged since 1/16/2024.    Convex left lumbar spine curvature partially visualized. Chronic compression fracture of L4 superior  endplate is evident. Severe disc degeneration at L3-L4 occurs, with left lateral translation of L3 on L4 noted. Moderate disc degeneration occurs at L4-L5 with large left paraspinous osteophyte unchanged severe left L4-L5 and L5-S1 facet joint osteoarthrosis and neural foramen narrowing is present. Mild to moderate disc degeneration occurs at L5-S1. Sacroiliac joints are normal.    Stippled foci of mineralization occur about the superior left gluteus medius muscle. Confluent density about the left hip is nonspecific, with some scattered mild demineralization noted. Confluent density is noted in the superolateral left hip subcutaneous fat, with skin staples overlying. Lack of IV contrast limits evaluation of the soft tissues. Severe left and right erector spinae muscle fatty atrophy is evident.    Impression:    1. Acute fracture involving screw at junction of left femoral head and neck components of left hip hemiarthroplasty, with superior displacement of the distal fragment.  2. Old healed fractures of right pubic body and left inferior and superior pubic rami.  3. Nonspecific confluent density about the left hip, suboptimally characterized on CT without IV contrast. Subcutaneous component of this could be related to lymphoma seroma, or in the appropriate clinical setting, abscess.    Electronically signed by:  Bryan Stevens MD  02/14/2024 10:51 AM [facility] Workstation: [id number]                                     Medications   HYDROmorphone injection 1 mg (1 mg Intravenous Given 2/14/24 1008)   HYDROmorphone injection 1 mg (1 mg Intravenous Given 2/14/24 1126)     Medical Decision Making  78-year-old female presented emergency department after a fall.  Patient fell on her left hip and CT scan does show evidence of fracture of the hardware.  Case discussed with our orthopedic surgeon Dr. Guerra who recommended transferring patient to [facility] given the complexity of this surgery.  Patient currently on antibiotics.   Patient was accepted by [facility] by orthopedic surgeon.  Transfer center contacted.  Patient to be transferred for higher level of care and further management and surgery.  I was told that patient can be fed today.  Pain control while in the emergency department.  Transfer to [facility] for higher level of care and also as patient had previous surgery there    Amount and/or Complexity of Data Reviewed  Labs: ordered. Decision-making details documented in ED Course.  Radiology: ordered. Decision-making details documented in ED Course.    Risk  Prescription drug management.                                      Clinical Impression:  Final diagnoses:  [R52] Pain  [S72.002A] Closed fracture of left hip, initial encounter (Primary)          ED Disposition Condition    Transfer to Another Facility Fair                Christiana Matos MD  02/14/24 1141       Christiana Matos MD  02/14/24 1154

## 2024-02-15 ENCOUNTER — ANESTHESIA (OUTPATIENT)
Dept: SURGERY | Facility: HOSPITAL | Age: 79
DRG: 481 | End: 2024-02-15
Payer: MEDICARE

## 2024-02-15 ENCOUNTER — ANESTHESIA EVENT (OUTPATIENT)
Dept: SURGERY | Facility: HOSPITAL | Age: 79
DRG: 481 | End: 2024-02-15
Payer: MEDICARE

## 2024-02-15 PROBLEM — I50.32 CHRONIC HEART FAILURE WITH PRESERVED EJECTION FRACTION: Status: ACTIVE | Noted: 2024-01-16

## 2024-02-15 PROBLEM — I48.0 PAROXYSMAL ATRIAL FIBRILLATION: Status: ACTIVE | Noted: 2022-06-16

## 2024-02-15 PROBLEM — E55.9 VITAMIN D DEFICIENCY: Status: ACTIVE | Noted: 2024-02-15

## 2024-02-15 PROBLEM — E88.09 HYPOALBUMINEMIA: Status: ACTIVE | Noted: 2024-02-15

## 2024-02-15 PROBLEM — E83.51 HYPOCALCEMIA: Status: ACTIVE | Noted: 2024-02-15

## 2024-02-15 LAB
ABO + RH BLD: NORMAL
ALBUMIN SERPL BCP-MCNC: 1.2 G/DL (ref 3.5–5.2)
ALP SERPL-CCNC: 114 U/L (ref 55–135)
ALT SERPL W/O P-5'-P-CCNC: 10 U/L (ref 10–44)
ANION GAP SERPL CALC-SCNC: 5 MMOL/L (ref 8–16)
AST SERPL-CCNC: 19 U/L (ref 10–40)
BASOPHILS # BLD AUTO: 0.05 K/UL (ref 0–0.2)
BASOPHILS NFR BLD: 0.4 % (ref 0–1.9)
BILIRUB SERPL-MCNC: 0.4 MG/DL (ref 0.1–1)
BLD GP AB SCN CELLS X3 SERPL QL: NORMAL
BLD PROD TYP BPU: NORMAL
BLOOD UNIT EXPIRATION DATE: NORMAL
BLOOD UNIT TYPE CODE: 7300
BLOOD UNIT TYPE: NORMAL
BUN SERPL-MCNC: 9 MG/DL (ref 8–23)
CALCIUM SERPL-MCNC: 6.3 MG/DL (ref 8.7–10.5)
CHLORIDE SERPL-SCNC: 111 MMOL/L (ref 95–110)
CO2 SERPL-SCNC: 23 MMOL/L (ref 23–29)
CODING SYSTEM: NORMAL
CREAT SERPL-MCNC: 0.7 MG/DL (ref 0.5–1.4)
CROSSMATCH INTERPRETATION: NORMAL
CRP SERPL-MCNC: 82.2 MG/L (ref 0–8.2)
DIFFERENTIAL METHOD BLD: ABNORMAL
DISPENSE STATUS: NORMAL
EOSINOPHIL # BLD AUTO: 0.5 K/UL (ref 0–0.5)
EOSINOPHIL NFR BLD: 3.7 % (ref 0–8)
ERYTHROCYTE [DISTWIDTH] IN BLOOD BY AUTOMATED COUNT: 18.8 % (ref 11.5–14.5)
EST. GFR  (NO RACE VARIABLE): >60 ML/MIN/1.73 M^2
GLUCOSE SERPL-MCNC: 81 MG/DL (ref 70–110)
GRAM STN SPEC: NORMAL
GRAM STN SPEC: NORMAL
HCT VFR BLD AUTO: 25 % (ref 37–48.5)
HGB BLD-MCNC: 7.8 G/DL (ref 12–16)
IMM GRANULOCYTES # BLD AUTO: 0.09 K/UL (ref 0–0.04)
IMM GRANULOCYTES NFR BLD AUTO: 0.7 % (ref 0–0.5)
LYMPHOCYTES # BLD AUTO: 1.2 K/UL (ref 1–4.8)
LYMPHOCYTES NFR BLD: 9.5 % (ref 18–48)
MCH RBC QN AUTO: 29.1 PG (ref 27–31)
MCHC RBC AUTO-ENTMCNC: 31.2 G/DL (ref 32–36)
MCV RBC AUTO: 93 FL (ref 82–98)
MONOCYTES # BLD AUTO: 0.7 K/UL (ref 0.3–1)
MONOCYTES NFR BLD: 5.9 % (ref 4–15)
NEUTROPHILS # BLD AUTO: 10 K/UL (ref 1.8–7.7)
NEUTROPHILS NFR BLD: 79.8 % (ref 38–73)
NRBC BLD-RTO: 0 /100 WBC
NUM UNITS TRANS PACKED RBC: NORMAL
NUM UNITS TRANS PACKED RBC: NORMAL
OHS QRS DURATION: 88 MS
OHS QTC CALCULATION: 411 MS
PLATELET # BLD AUTO: 481 K/UL (ref 150–450)
PMV BLD AUTO: 9.1 FL (ref 9.2–12.9)
POTASSIUM SERPL-SCNC: 3.2 MMOL/L (ref 3.5–5.1)
PREALB SERPL-MCNC: 9 MG/DL (ref 20–43)
PROT SERPL-MCNC: 4.6 G/DL (ref 6–8.4)
RBC # BLD AUTO: 2.68 M/UL (ref 4–5.4)
SODIUM SERPL-SCNC: 139 MMOL/L (ref 136–145)
SPECIMEN OUTDATE: NORMAL
TRANS ERYTHROCYTES VOL PATIENT: NORMAL ML
TRANS ERYTHROCYTES VOL PATIENT: NORMAL ML
WBC # BLD AUTO: 12.47 K/UL (ref 3.9–12.7)

## 2024-02-15 PROCEDURE — 71000016 HC POSTOP RECOV ADDL HR: Performed by: ORTHOPAEDIC SURGERY

## 2024-02-15 PROCEDURE — 63600175 PHARM REV CODE 636 W HCPCS: Performed by: ANESTHESIOLOGY

## 2024-02-15 PROCEDURE — 99223 1ST HOSP IP/OBS HIGH 75: CPT | Mod: GC,,, | Performed by: STUDENT IN AN ORGANIZED HEALTH CARE EDUCATION/TRAINING PROGRAM

## 2024-02-15 PROCEDURE — 71000015 HC POSTOP RECOV 1ST HR: Performed by: ORTHOPAEDIC SURGERY

## 2024-02-15 PROCEDURE — 80053 COMPREHEN METABOLIC PANEL: CPT

## 2024-02-15 PROCEDURE — C1713 ANCHOR/SCREW BN/BN,TIS/BN: HCPCS | Performed by: ORTHOPAEDIC SURGERY

## 2024-02-15 PROCEDURE — D9220A PRA ANESTHESIA: Mod: ANES,,, | Performed by: ANESTHESIOLOGY

## 2024-02-15 PROCEDURE — 36000710: Performed by: ORTHOPAEDIC SURGERY

## 2024-02-15 PROCEDURE — 63600175 PHARM REV CODE 636 W HCPCS: Performed by: NURSE ANESTHETIST, CERTIFIED REGISTERED

## 2024-02-15 PROCEDURE — 11046 DBRDMT MUSC&/FSCA EA ADDL: CPT | Mod: 58,,, | Performed by: ORTHOPAEDIC SURGERY

## 2024-02-15 PROCEDURE — 25000003 PHARM REV CODE 250: Performed by: HOSPITALIST

## 2024-02-15 PROCEDURE — 36000711: Performed by: ORTHOPAEDIC SURGERY

## 2024-02-15 PROCEDURE — C1729 CATH, DRAINAGE: HCPCS | Performed by: ORTHOPAEDIC SURGERY

## 2024-02-15 PROCEDURE — 0SPB0EZ REMOVAL OF ARTICULATING SPACER FROM LEFT HIP JOINT, OPEN APPROACH: ICD-10-PCS | Performed by: ORTHOPAEDIC SURGERY

## 2024-02-15 PROCEDURE — 71000033 HC RECOVERY, INTIAL HOUR: Performed by: ORTHOPAEDIC SURGERY

## 2024-02-15 PROCEDURE — 63600175 PHARM REV CODE 636 W HCPCS: Performed by: STUDENT IN AN ORGANIZED HEALTH CARE EDUCATION/TRAINING PROGRAM

## 2024-02-15 PROCEDURE — 63600175 PHARM REV CODE 636 W HCPCS

## 2024-02-15 PROCEDURE — 25000003 PHARM REV CODE 250

## 2024-02-15 PROCEDURE — 27201423 OPTIME MED/SURG SUP & DEVICES STERILE SUPPLY: Performed by: ORTHOPAEDIC SURGERY

## 2024-02-15 PROCEDURE — 25000003 PHARM REV CODE 250: Performed by: STUDENT IN AN ORGANIZED HEALTH CARE EDUCATION/TRAINING PROGRAM

## 2024-02-15 PROCEDURE — 99223 1ST HOSP IP/OBS HIGH 75: CPT | Mod: AI,24,25, | Performed by: ORTHOPAEDIC SURGERY

## 2024-02-15 PROCEDURE — D9220A PRA ANESTHESIA: Mod: CRNA,,, | Performed by: STUDENT IN AN ORGANIZED HEALTH CARE EDUCATION/TRAINING PROGRAM

## 2024-02-15 PROCEDURE — P9016 RBC LEUKOCYTES REDUCED: HCPCS | Performed by: STUDENT IN AN ORGANIZED HEALTH CARE EDUCATION/TRAINING PROGRAM

## 2024-02-15 PROCEDURE — 86140 C-REACTIVE PROTEIN: CPT

## 2024-02-15 PROCEDURE — 63600175 PHARM REV CODE 636 W HCPCS: Performed by: ORTHOPAEDIC SURGERY

## 2024-02-15 PROCEDURE — 87205 SMEAR GRAM STAIN: CPT | Performed by: ORTHOPAEDIC SURGERY

## 2024-02-15 PROCEDURE — 36430 TRANSFUSION BLD/BLD COMPNT: CPT

## 2024-02-15 PROCEDURE — 87206 SMEAR FLUORESCENT/ACID STAI: CPT | Performed by: ORTHOPAEDIC SURGERY

## 2024-02-15 PROCEDURE — P9021 RED BLOOD CELLS UNIT: HCPCS

## 2024-02-15 PROCEDURE — 0SRB0EZ REPLACEMENT OF LEFT HIP JOINT WITH ARTICULATING SPACER, OPEN APPROACH: ICD-10-PCS | Performed by: ORTHOPAEDIC SURGERY

## 2024-02-15 PROCEDURE — 30233N1 TRANSFUSION OF NONAUTOLOGOUS RED BLOOD CELLS INTO PERIPHERAL VEIN, PERCUTANEOUS APPROACH: ICD-10-PCS | Performed by: ORTHOPAEDIC SURGERY

## 2024-02-15 PROCEDURE — 11043 DBRDMT MUSC&/FSCA 1ST 20/<: CPT | Mod: 58,,, | Performed by: ORTHOPAEDIC SURGERY

## 2024-02-15 PROCEDURE — 86920 COMPATIBILITY TEST SPIN: CPT

## 2024-02-15 PROCEDURE — 87116 MYCOBACTERIA CULTURE: CPT | Performed by: ORTHOPAEDIC SURGERY

## 2024-02-15 PROCEDURE — 37000009 HC ANESTHESIA EA ADD 15 MINS: Performed by: ORTHOPAEDIC SURGERY

## 2024-02-15 PROCEDURE — 86920 COMPATIBILITY TEST SPIN: CPT | Performed by: STUDENT IN AN ORGANIZED HEALTH CARE EDUCATION/TRAINING PROGRAM

## 2024-02-15 PROCEDURE — 87102 FUNGUS ISOLATION CULTURE: CPT | Performed by: ORTHOPAEDIC SURGERY

## 2024-02-15 PROCEDURE — 0KBP0ZZ EXCISION OF LEFT HIP MUSCLE, OPEN APPROACH: ICD-10-PCS | Performed by: ORTHOPAEDIC SURGERY

## 2024-02-15 PROCEDURE — 11000001 HC ACUTE MED/SURG PRIVATE ROOM

## 2024-02-15 PROCEDURE — 87070 CULTURE OTHR SPECIMN AEROBIC: CPT | Performed by: ORTHOPAEDIC SURGERY

## 2024-02-15 PROCEDURE — 37000008 HC ANESTHESIA 1ST 15 MINUTES: Performed by: ORTHOPAEDIC SURGERY

## 2024-02-15 PROCEDURE — 85025 COMPLETE CBC W/AUTO DIFF WBC: CPT

## 2024-02-15 PROCEDURE — C1776 JOINT DEVICE (IMPLANTABLE): HCPCS | Performed by: ORTHOPAEDIC SURGERY

## 2024-02-15 PROCEDURE — 25000003 PHARM REV CODE 250: Performed by: NURSE ANESTHETIST, CERTIFIED REGISTERED

## 2024-02-15 PROCEDURE — 87075 CULTR BACTERIA EXCEPT BLOOD: CPT | Performed by: ORTHOPAEDIC SURGERY

## 2024-02-15 DEVICE — CEMENT BONE RDPQ 40G PDR 20ML: Type: IMPLANTABLE DEVICE | Site: HIP | Status: FUNCTIONAL

## 2024-02-15 RX ORDER — ACETAMINOPHEN 10 MG/ML
INJECTION, SOLUTION INTRAVENOUS
Status: DISCONTINUED | OUTPATIENT
Start: 2024-02-15 | End: 2024-02-15

## 2024-02-15 RX ORDER — PROCHLORPERAZINE EDISYLATE 5 MG/ML
5 INJECTION INTRAMUSCULAR; INTRAVENOUS EVERY 30 MIN PRN
Status: DISCONTINUED | OUTPATIENT
Start: 2024-02-15 | End: 2024-02-15 | Stop reason: HOSPADM

## 2024-02-15 RX ORDER — CHOLECALCIFEROL (VITAMIN D3) 25 MCG
1000 TABLET ORAL DAILY
Status: DISCONTINUED | OUTPATIENT
Start: 2024-02-16 | End: 2024-02-20 | Stop reason: HOSPADM

## 2024-02-15 RX ORDER — VANCOMYCIN HYDROCHLORIDE 1 G/20ML
INJECTION, POWDER, LYOPHILIZED, FOR SOLUTION INTRAVENOUS
Status: DISCONTINUED | OUTPATIENT
Start: 2024-02-15 | End: 2024-02-15 | Stop reason: HOSPADM

## 2024-02-15 RX ORDER — ERGOCALCIFEROL 1.25 MG/1
50000 CAPSULE ORAL
Status: DISCONTINUED | OUTPATIENT
Start: 2024-02-15 | End: 2024-02-20 | Stop reason: HOSPADM

## 2024-02-15 RX ORDER — ROPIVACAINE HYDROCHLORIDE 2 MG/ML
0.1 INJECTION, SOLUTION EPIDURAL; INFILTRATION; PERINEURAL CONTINUOUS
Status: DISCONTINUED | OUTPATIENT
Start: 2024-02-15 | End: 2024-02-15

## 2024-02-15 RX ORDER — POLYETHYLENE GLYCOL 3350 17 G/17G
17 POWDER, FOR SOLUTION ORAL DAILY
Status: DISCONTINUED | OUTPATIENT
Start: 2024-02-15 | End: 2024-02-20 | Stop reason: HOSPADM

## 2024-02-15 RX ORDER — MORPHINE SULFATE 2 MG/ML
2 INJECTION, SOLUTION INTRAMUSCULAR; INTRAVENOUS EVERY 6 HOURS PRN
Status: DISCONTINUED | OUTPATIENT
Start: 2024-02-15 | End: 2024-02-20 | Stop reason: HOSPADM

## 2024-02-15 RX ORDER — DEXMEDETOMIDINE HYDROCHLORIDE 100 UG/ML
INJECTION, SOLUTION INTRAVENOUS
Status: DISCONTINUED | OUTPATIENT
Start: 2024-02-15 | End: 2024-02-15

## 2024-02-15 RX ORDER — TRANEXAMIC ACID 100 MG/ML
INJECTION, SOLUTION INTRAVENOUS
Status: DISCONTINUED | OUTPATIENT
Start: 2024-02-15 | End: 2024-02-15

## 2024-02-15 RX ORDER — PROPOFOL 10 MG/ML
VIAL (ML) INTRAVENOUS
Status: DISCONTINUED | OUTPATIENT
Start: 2024-02-15 | End: 2024-02-15

## 2024-02-15 RX ORDER — HYDROCODONE BITARTRATE AND ACETAMINOPHEN 500; 5 MG/1; MG/1
TABLET ORAL
Status: CANCELLED | OUTPATIENT
Start: 2024-02-15

## 2024-02-15 RX ORDER — ROCURONIUM BROMIDE 10 MG/ML
INJECTION, SOLUTION INTRAVENOUS
Status: DISCONTINUED | OUTPATIENT
Start: 2024-02-15 | End: 2024-02-15

## 2024-02-15 RX ORDER — PHENYLEPHRINE HYDROCHLORIDE 10 MG/ML
INJECTION INTRAVENOUS
Status: DISCONTINUED | OUTPATIENT
Start: 2024-02-15 | End: 2024-02-15

## 2024-02-15 RX ORDER — OXYCODONE HYDROCHLORIDE 5 MG/1
5 TABLET ORAL EVERY 4 HOURS PRN
Status: DISCONTINUED | OUTPATIENT
Start: 2024-02-15 | End: 2024-02-20 | Stop reason: HOSPADM

## 2024-02-15 RX ORDER — OXYCODONE HYDROCHLORIDE 10 MG/1
10 TABLET ORAL EVERY 4 HOURS PRN
Status: DISCONTINUED | OUTPATIENT
Start: 2024-02-15 | End: 2024-02-20 | Stop reason: HOSPADM

## 2024-02-15 RX ORDER — OXYCODONE HYDROCHLORIDE 5 MG/1
5 TABLET ORAL
Status: DISCONTINUED | OUTPATIENT
Start: 2024-02-15 | End: 2024-02-15 | Stop reason: HOSPADM

## 2024-02-15 RX ORDER — MUPIROCIN 20 MG/G
OINTMENT TOPICAL
Status: DISCONTINUED | OUTPATIENT
Start: 2024-02-15 | End: 2024-02-15 | Stop reason: HOSPADM

## 2024-02-15 RX ORDER — LIDOCAINE HYDROCHLORIDE 20 MG/ML
INJECTION INTRAVENOUS
Status: DISCONTINUED | OUTPATIENT
Start: 2024-02-15 | End: 2024-02-15

## 2024-02-15 RX ORDER — EPHEDRINE SULFATE 50 MG/ML
INJECTION, SOLUTION INTRAVENOUS
Status: DISCONTINUED | OUTPATIENT
Start: 2024-02-15 | End: 2024-02-15

## 2024-02-15 RX ORDER — FENTANYL CITRATE 50 UG/ML
INJECTION, SOLUTION INTRAMUSCULAR; INTRAVENOUS
Status: DISCONTINUED | OUTPATIENT
Start: 2024-02-15 | End: 2024-02-15

## 2024-02-15 RX ORDER — HYDROMORPHONE HYDROCHLORIDE 1 MG/ML
0.2 INJECTION, SOLUTION INTRAMUSCULAR; INTRAVENOUS; SUBCUTANEOUS EVERY 5 MIN PRN
Status: DISCONTINUED | OUTPATIENT
Start: 2024-02-15 | End: 2024-02-15 | Stop reason: HOSPADM

## 2024-02-15 RX ORDER — DEXAMETHASONE SODIUM PHOSPHATE 4 MG/ML
INJECTION, SOLUTION INTRA-ARTICULAR; INTRALESIONAL; INTRAMUSCULAR; INTRAVENOUS; SOFT TISSUE
Status: DISCONTINUED | OUTPATIENT
Start: 2024-02-15 | End: 2024-02-15

## 2024-02-15 RX ORDER — ONDANSETRON 2 MG/ML
INJECTION INTRAMUSCULAR; INTRAVENOUS
Status: DISCONTINUED | OUTPATIENT
Start: 2024-02-15 | End: 2024-02-15

## 2024-02-15 RX ORDER — FENTANYL CITRATE 50 UG/ML
25 INJECTION, SOLUTION INTRAMUSCULAR; INTRAVENOUS EVERY 5 MIN PRN
Status: DISCONTINUED | OUTPATIENT
Start: 2024-02-15 | End: 2024-02-15 | Stop reason: HOSPADM

## 2024-02-15 RX ORDER — SODIUM CHLORIDE 0.9 % (FLUSH) 0.9 %
10 SYRINGE (ML) INJECTION
Status: DISCONTINUED | OUTPATIENT
Start: 2024-02-15 | End: 2024-02-20 | Stop reason: HOSPADM

## 2024-02-15 RX ORDER — HALOPERIDOL 5 MG/ML
0.5 INJECTION INTRAMUSCULAR EVERY 10 MIN PRN
Status: DISCONTINUED | OUTPATIENT
Start: 2024-02-15 | End: 2024-02-15 | Stop reason: HOSPADM

## 2024-02-15 RX ORDER — ONDANSETRON HYDROCHLORIDE 2 MG/ML
INJECTION, SOLUTION INTRAVENOUS
Status: DISCONTINUED | OUTPATIENT
Start: 2024-02-15 | End: 2024-02-15

## 2024-02-15 RX ORDER — ACETAMINOPHEN 500 MG
1000 TABLET ORAL EVERY 6 HOURS
Status: COMPLETED | OUTPATIENT
Start: 2024-02-16 | End: 2024-02-17

## 2024-02-15 RX ORDER — CEFAZOLIN SODIUM 1 G/3ML
INJECTION, POWDER, FOR SOLUTION INTRAMUSCULAR; INTRAVENOUS
Status: DISCONTINUED | OUTPATIENT
Start: 2024-02-15 | End: 2024-02-15

## 2024-02-15 RX ORDER — HYDROMORPHONE HYDROCHLORIDE 1 MG/ML
INJECTION, SOLUTION INTRAMUSCULAR; INTRAVENOUS; SUBCUTANEOUS
Status: DISCONTINUED | OUTPATIENT
Start: 2024-02-15 | End: 2024-02-15

## 2024-02-15 RX ORDER — CALCIUM CARBONATE 200(500)MG
1000 TABLET,CHEWABLE ORAL DAILY
Status: DISCONTINUED | OUTPATIENT
Start: 2024-02-15 | End: 2024-02-20 | Stop reason: HOSPADM

## 2024-02-15 RX ORDER — POTASSIUM CHLORIDE 20 MEQ/1
40 TABLET, EXTENDED RELEASE ORAL ONCE
Status: COMPLETED | OUTPATIENT
Start: 2024-02-15 | End: 2024-02-15

## 2024-02-15 RX ADMIN — HYDROMORPHONE HYDROCHLORIDE 0.2 MG: 1 INJECTION, SOLUTION INTRAMUSCULAR; INTRAVENOUS; SUBCUTANEOUS at 08:02

## 2024-02-15 RX ADMIN — PHENYLEPHRINE HYDROCHLORIDE 100 MCG: 10 INJECTION INTRAVENOUS at 07:02

## 2024-02-15 RX ADMIN — TRANEXAMIC ACID 1000 MG: 100 INJECTION INTRAVENOUS at 06:02

## 2024-02-15 RX ADMIN — ONDANSETRON 4 MG: 2 INJECTION INTRAMUSCULAR; INTRAVENOUS at 07:02

## 2024-02-15 RX ADMIN — MORPHINE SULFATE 2 MG: 2 INJECTION, SOLUTION INTRAMUSCULAR; INTRAVENOUS at 04:02

## 2024-02-15 RX ADMIN — VANCOMYCIN HYDROCHLORIDE 1000 MG: 1 INJECTION, POWDER, LYOPHILIZED, FOR SOLUTION INTRAVENOUS at 03:02

## 2024-02-15 RX ADMIN — OXYCODONE HYDROCHLORIDE 10 MG: 10 TABLET ORAL at 02:02

## 2024-02-15 RX ADMIN — ONDANSETRON 0.5 G: 2 INJECTION INTRAMUSCULAR; INTRAVENOUS at 07:02

## 2024-02-15 RX ADMIN — CEFAZOLIN 2 G: 330 INJECTION, POWDER, FOR SOLUTION INTRAMUSCULAR; INTRAVENOUS at 06:02

## 2024-02-15 RX ADMIN — EPHEDRINE SULFATE 5 MG: 50 INJECTION INTRAVENOUS at 07:02

## 2024-02-15 RX ADMIN — NYSTATIN: 100000 POWDER TOPICAL at 08:02

## 2024-02-15 RX ADMIN — SUGAMMADEX 200 MG: 100 INJECTION, SOLUTION INTRAVENOUS at 08:02

## 2024-02-15 RX ADMIN — DOXYCYCLINE HYCLATE 100 MG: 100 TABLET, COATED ORAL at 08:02

## 2024-02-15 RX ADMIN — DEXMEDETOMIDINE 8 MCG: 100 INJECTION, SOLUTION, CONCENTRATE INTRAVENOUS at 07:02

## 2024-02-15 RX ADMIN — MUPIROCIN: 20 OINTMENT TOPICAL at 03:02

## 2024-02-15 RX ADMIN — POTASSIUM CHLORIDE 40 MEQ: 1500 TABLET, EXTENDED RELEASE ORAL at 08:02

## 2024-02-15 RX ADMIN — DEXMEDETOMIDINE 8 MCG: 100 INJECTION, SOLUTION, CONCENTRATE INTRAVENOUS at 08:02

## 2024-02-15 RX ADMIN — PREGABALIN 200 MG: 150 CAPSULE ORAL at 08:02

## 2024-02-15 RX ADMIN — APIXABAN 2.5 MG: 2.5 TABLET, FILM COATED ORAL at 10:02

## 2024-02-15 RX ADMIN — EPHEDRINE SULFATE 5 MG: 50 INJECTION INTRAVENOUS at 06:02

## 2024-02-15 RX ADMIN — FENTANYL CITRATE 25 MCG: 50 INJECTION INTRAMUSCULAR; INTRAVENOUS at 09:02

## 2024-02-15 RX ADMIN — ACETAMINOPHEN 1000 MG: 500 TABLET ORAL at 06:02

## 2024-02-15 RX ADMIN — METOPROLOL TARTRATE 50 MG: 50 TABLET, FILM COATED ORAL at 08:02

## 2024-02-15 RX ADMIN — PHENYLEPHRINE HYDROCHLORIDE 200 MCG: 10 INJECTION INTRAVENOUS at 06:02

## 2024-02-15 RX ADMIN — ROCURONIUM BROMIDE 50 MG: 10 INJECTION, SOLUTION INTRAVENOUS at 05:02

## 2024-02-15 RX ADMIN — OXYCODONE HYDROCHLORIDE 10 MG: 10 TABLET ORAL at 08:02

## 2024-02-15 RX ADMIN — ESCITALOPRAM OXALATE 20 MG: 20 TABLET ORAL at 08:02

## 2024-02-15 RX ADMIN — DOCUSATE SODIUM 100 MG: 100 CAPSULE, LIQUID FILLED ORAL at 08:02

## 2024-02-15 RX ADMIN — ACETAMINOPHEN 1000 MG: 10 INJECTION, SOLUTION INTRAVENOUS at 07:02

## 2024-02-15 RX ADMIN — FENTANYL CITRATE 50 MCG: 50 INJECTION, SOLUTION INTRAMUSCULAR; INTRAVENOUS at 06:02

## 2024-02-15 RX ADMIN — DEXAMETHASONE SODIUM PHOSPHATE 4 MG: 4 INJECTION, SOLUTION INTRAMUSCULAR; INTRAVENOUS at 06:02

## 2024-02-15 RX ADMIN — Medication 6 MG: at 02:02

## 2024-02-15 RX ADMIN — SODIUM CHLORIDE: 9 INJECTION, SOLUTION INTRAVENOUS at 12:02

## 2024-02-15 RX ADMIN — HYDROMORPHONE HYDROCHLORIDE 0.4 MG: 1 INJECTION, SOLUTION INTRAMUSCULAR; INTRAVENOUS; SUBCUTANEOUS at 08:02

## 2024-02-15 RX ADMIN — OXYCODONE HYDROCHLORIDE 10 MG: 10 TABLET ORAL at 09:02

## 2024-02-15 RX ADMIN — ROCURONIUM BROMIDE 20 MG: 10 INJECTION, SOLUTION INTRAVENOUS at 06:02

## 2024-02-15 RX ADMIN — ERGOCALCIFEROL 50000 UNITS: 1.25 CAPSULE ORAL at 09:02

## 2024-02-15 RX ADMIN — NYSTATIN: 100000 POWDER TOPICAL at 03:02

## 2024-02-15 RX ADMIN — SODIUM CHLORIDE: 9 INJECTION, SOLUTION INTRAVENOUS at 05:02

## 2024-02-15 RX ADMIN — SODIUM CHLORIDE, SODIUM GLUCONATE, SODIUM ACETATE, POTASSIUM CHLORIDE, MAGNESIUM CHLORIDE, SODIUM PHOSPHATE, DIBASIC, AND POTASSIUM PHOSPHATE: .53; .5; .37; .037; .03; .012; .00082 INJECTION, SOLUTION INTRAVENOUS at 05:02

## 2024-02-15 RX ADMIN — LIDOCAINE HYDROCHLORIDE 100 MG: 20 INJECTION INTRAVENOUS at 05:02

## 2024-02-15 RX ADMIN — PROPOFOL 100 MG: 10 INJECTION, EMULSION INTRAVENOUS at 05:02

## 2024-02-15 RX ADMIN — FENTANYL CITRATE 50 MCG: 50 INJECTION, SOLUTION INTRAMUSCULAR; INTRAVENOUS at 05:02

## 2024-02-15 RX ADMIN — AMLODIPINE BESYLATE AND BENAZEPRIL HYDROCHLORIDE 1 CAPSULE: 5; 20 CAPSULE ORAL at 08:02

## 2024-02-15 NOTE — HPI
Froilan Ray is a 78 y.o. 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  She has been receiving daptomycin per her PICC line as well as oral doxycycline. Patient denies any head trauma or LOC. Patient denies new numbness and tingling.  She endorses chronic neuropathy of the feet bilaterally. Denies any other musculoskeletal pain or injuries. No known history of prior *** injury or surgery.  Uses a walker at home.  Takes Eliquis. Last ate yesterday.  They deny IV drug use.   They deny tobacco use.   They deny alcohol use.   They deny immunosuppressant medications.  They deny chemotherapy.  They deny radiation therapy.

## 2024-02-15 NOTE — PLAN OF CARE
Seen this morning on floor. She reports pain to the hip on left and multimodal pain meds in place. Blood transfusing on exam ordered by ortho in preparation of surgery. Discussed with dr jordan who is discussing with dr arnold about surgical planning given failed spacer with fx around prosthetic screw recently placed.  On dapto and doxy, PICC in RUE. Blood infusing in PICC now. cRP 103--82. Vit D 9 and replacement started.   Potassium 3.2, Calcium 8.5 corrected for albumin and replacement started as still low. Hg 7.8 baseline 7-8 since recent hospital admission. CXR showing picc in ploace. NSR on EKG.  UA with many bacteria, WBC. Hx of MRSA seeding into urine from blood previously, will f/u if true acute cystitis with further Cx results.  EF 50-55%, HTN, paroxysmla atrial fibrillation.  Will f/u ortho plans for possible OR today. NPO in interim.  ID consulted given on dapto to continue in hospital.

## 2024-02-15 NOTE — SUBJECTIVE & OBJECTIVE
Past Medical History:   Diagnosis Date    Anemia     Arthritis     Bleeding ulcer 07/2016    Chronic pain     DDD (degenerative disc disease), cervical     DDD (degenerative disc disease), lumbar     Depression     Disc degeneration, lumbosacral     Diverticulitis     Encounter for blood transfusion     Hypertension     IBS (irritable bowel syndrome)     Neuropathy of both feet     Seizures     none  since 2017    Umbilical hernia 2020       Past Surgical History:   Procedure Laterality Date    ARTHROPLASTY, HIP, GIRDLESTONE, POSTERIOR APPROACH Left 1/19/2024    Procedure: ARTHROPLASTY, HIP, GIRDLESTONE, POSTERIOR APPROACH;  Surgeon: Bulmaro Tellez MD;  [address]  Service: Orthopedics;  Laterality: Left;    ARTHROPLASTY, HIP, GIRDLESTONE, POSTERIOR APPROACH Left 1/25/2024    Procedure: Irrigation and debridement left hip,;  Surgeon: Juanito Painting MD;  [facility];  Service: Orthopedics;  Laterality: Left;    BACK SURGERY      BREAST BIOPSY      BREAST SURGERY Right     lumpectomy    CAUDAL EPIDURAL STEROID INJECTION N/A 01/28/2022    Procedure: Injection-steroid-epidural-caudal;  Surgeon: Luzmaria Marcelino MD;  [facility] OR;  Service: Pain Management;  Laterality: N/A;    COLONOSCOPY N/A 01/14/2022    Procedure: COLONOSCOPY;  Surgeon: Abby Landers MD;  Location: [facility];  Service: Endoscopy;  Laterality: N/A;    ENDOSCOPIC ULTRASOUND OF UPPER GASTROINTESTINAL TRACT N/A 07/21/2020    Procedure: ULTRASOUND, UPPER GI TRACT, ENDOSCOPIC;  Surgeon: Marcio Nguyễn III, MD;  Location: [facility] ENDO;  Service: Endoscopy;  Laterality: N/A;    ESOPHAGOGASTRODUODENOSCOPY N/A 01/14/2022    Procedure: EGD (ESOPHAGOGASTRODUODENOSCOPY);  Surgeon: Abby Landers MD;  Location: [facility];  Service: Endoscopy;  Laterality: N/A;    ESOPHAGOGASTRODUODENOSCOPY N/A 06/10/2022    Procedure: EGD (ESOPHAGOGASTRODUODENOSCOPY);  Surgeon: Abby Landers MD;  Location: [facility]  ENDO;  Service: Endoscopy;  Laterality: N/A;    EYE SURGERY      cataract    HYSTERECTOMY      INTRAMEDULLARY RODDING OF TROCHANTER OF FEMUR Left 12/11/2018    Procedure: INSERTION, INTRAMEDULLARY SANTOS, FEMUR, TROCHANTER;  Surgeon: Eulalio De La Cruz MD;  Location: [facility] OR;  Service: Orthopedics;  Laterality: Left;    IRRIGATION AND DEBRIDEMENT OF LOWER EXTREMITY Left 1/19/2024    Procedure: IRRIGATION AND DEBRIDEMENT, LOWER EXTREMITY;  Surgeon: Bulmaro Tellez MD;  [facility];  Service: Orthopedics;  Laterality: Left;    REPAIR OF EPIGASTRIC HERNIA N/A 12/7/2023    Procedure: REPAIR, HERNIA, EPIGASTRIC;  Surgeon: Vipul Escobar MD;  Location: [facility] OR;  Service: General;  Laterality: N/A;    SPINAL CORD STIMULATOR IMPLANT  09/18/2013    and removal    SPINE SURGERY  2006    lumbar L2-S1 decompression.    SPINE SURGERY      cervical decompression    TONSILLECTOMY         Review of patient's allergies indicates:  No Known Allergies    Current Facility-Administered Medications   Medication    0.9%  NaCl infusion    acetaminophen tablet 1,000 mg    amlodipine-benazepril 5-20 mg per capsule 1 capsule    bisacodyL suppository 10 mg    cyclobenzaprine tablet 10 mg    DAPTOmycin (CUBICIN) 675 mg in sodium chloride 0.9% SolP 50 mL IVPB    docusate sodium capsule 100 mg    doxycycline tablet 100 mg    EScitalopram oxalate tablet 20 mg    melatonin tablet 6 mg    metoprolol tartrate (LOPRESSOR) tablet 50 mg    oxyCODONE immediate release tablet 5 mg    And    oxyCODONE immediate release tablet Tab 10 mg    And    morphine injection 2 mg    nystatin powder    ondansetron injection 4 mg    pregabalin capsule 200 mg    sodium chloride 0.9% flush 10 mL     Family History       Problem Relation (Age of Onset)    COPD Brother    Coronary artery disease Father    Depression Father    Diabetes Mother, Father, Sister, Brother    Heart disease Father    Hypertension Mother, Father    Irritable bowel syndrome Mother           Tobacco Use    Smoking status: Never    Smokeless tobacco: Never   Substance and Sexual Activity    Alcohol use: No    Drug use: No    Sexual activity: Not Currently     ROS    Constitutional: negative for fevers  Eyes: negative visual changes  ENT: negative for hearing loss  Respiratory: negative for dyspnea  Cardiovascular: negative for chest pain  Gastrointestinal: negative for abdominal pain  Genitourinary: negative for dysuria  Neurological: negative for headaches  Behavioral/Psych: negative for hallucinations  Endocrine: negative for temperature intolerance      Objective:     Vital Signs (Most Recent):  Temp: 99.4 °F (37.4 °C) (02/15/24 0024)  Pulse: 70 (02/15/24 0024)  Resp: 18 (02/15/24 0024)  BP: (!) 119/59 (02/15/24 0024)  SpO2: 95 % (02/15/24 0024) Vital Signs (24h Range):  Temp:  [98.7 °F (37.1 °C)-99.4 °F (37.4 °C)] 99.4 °F (37.4 °C)  Pulse:  [] 70  Resp:  [12-21] 18  SpO2:  [95 %-99 %] 95 %  BP: (101-167)/(55-71) 119/59           There is no height or weight on file to calculate BMI.    No intake or output data in the 24 hours ending 02/15/24 0035     Ortho/SPM Exam     Gen:  No acute distress, well-developed, well nourished.  CV:  Peripherally well-perfused. 2+ radial pulses, symmetric.  Respiratory:  Normal respiratory effort. No accessory muscle use.   Head/Neck:  Normocephalic.  Atraumatic. Sclera anicteric. TM. Neck supple.  Neuro: CN 2-12 grossly intact. No FND. Awake. Alert. Oriented to person, place, time, and situation.   Abdomen: Soft, NTND.      MSK:  Left Upper Extremity  Inspection  - Skin intact throughout, no open wounds  - No swelling  - No ecchymosis, erythema, or signs of cellulitis  Palpation  - NonTTP throughout, no palpable abnormality   Range of motion  - AROM and PROM of the shoulder, elbow, wrist, and hand intact  Stability  - No evidence of joint dislocation or abnormal laxity   Neurovascular  - AIN/PIN/Radial/Median/Ulnar Nerves assessed in isolation without  "deficit  - Able to give thumbs up, make "OK" sign, cross IF/LF, abduct/adduct fingers, make fist  - SILT throughout  - Compartments soft  - Radial artery palpated  - Capillary Refill <3s  - Muscle tone normal    Right Upper Extremity  Inspection  - Skin intact throughout, no open wounds  - No swelling  - No ecchymosis, erythema, or signs of cellulitis  Palpation  - NonTTP throughout, no palpable abnormality   Range of motion  - AROM and PROM of the shoulder, elbow, wrist, and hand intact  Stability  - No evidence of joint dislocation or abnormal laxity  Neurovascular  - AIN/PIN/Radial/Median/Ulnar Nerves assessed in isolation without deficit  - Able to give thumbs up, make "OK" sign, cross IF/LF, abduct/adduct fingers, make fist  - SILT throughout  - Compartments soft  - Radial artery palpated  - Capillary Refill <3s  - Muscle tone normal      Left Lower Extremity  Inspection  - Skin intact throughout, no scars present  - No swelling  - No ecchymosis, erythema, or signs of cellulitis  - TTP at hip/proximal femur  - No tenderness to palpation of middle or distal femur  - No tenderness to palpation of proximal, middle, or distal aspects of tibia or fibula  - No tenderness to palpation of foot  - Pain with any ROM at hip  - Full painless ROM of knee and ankle  - Compartments soft  - SILT Sa/Vargas/DP/SP/T  - Motor intact EHL/FHL/TA/Gastroc  - 2+ DP  - Brisk capillary refill       Right Lower Extremity  Inspection  - Skin intact throughout, no open wounds  - No swelling  - No ecchymosis, erythema, or signs of cellulitis  Palpation  - NonTTP throughout, no palpable abnormality   Range of motion  - AROM and PROM of the hip, knee, ankle, and foot intact  Stability  - No evidence of joint dislocation or abnormal laxity  Neurovascular  - TA/EHL/Gastroc/FHL assessed in isolation without deficit  - SILT throughout  - Compartments soft  - DP palpated   - Capillary Refill <3s  - Negative Log roll  - Negative Stinchfield  - Muscle " tone normal    Spine/pelvis/axial body:  Patient denies any new tenderness to palpation of her back, however has chronic pain over her thoracic spine where she has noticeable curve that she states has been present since childhood  No pain with compression of pelvis  No chest wall or abdominal tenderness  No decubitus ulcers  Muscle tone normal      Significant Labs: CBC:   Recent Labs   Lab 02/14/24  1137   WBC 10.03   HGB 7.2*   HCT 23.8*   *     CMP:   Recent Labs   Lab 02/14/24  1137      K 4.0      CO2 28   GLU 91   BUN 13   CREATININE 0.8   CALCIUM 7.4*   PROT 5.8*   ALBUMIN 2.0*   BILITOT 0.3   ALKPHOS 137*   AST 20   ALT 11   ANIONGAP 5*     Coagulation:   Recent Labs   Lab 02/14/24  1137   LABPROT 11.6   INR 1.0     CRP:   Recent Labs   Lab 02/14/24  2241   .8*     All pertinent labs within the past 24 hours have been reviewed.    Significant Imaging: CT: I have reviewed all pertinent results/findings and my personal findings are:  CT pelvis shows hardware failure of left-sided antibiotic spacer with significant displacement of the femur.  X-Ray: I have reviewed all pertinent results/findings and my personal findings are:  X-ray pelvis shows left-sided antibiotic spacer hardware failure.  I have reviewed and interpreted all pertinent imaging results/findings.

## 2024-02-15 NOTE — SUBJECTIVE & OBJECTIVE
Past Medical History:   Diagnosis Date    Anemia     Arthritis     Bleeding ulcer 07/2016    Chronic pain     DDD (degenerative disc disease), cervical     DDD (degenerative disc disease), lumbar     Depression     Disc degeneration, lumbosacral     Diverticulitis     Encounter for blood transfusion     Hypertension     IBS (irritable bowel syndrome)     Neuropathy of both feet     Seizures     none  since 2017    Umbilical hernia 2020       Past Surgical History:   Procedure Laterality Date    ARTHROPLASTY, HIP, GIRDLESTONE, POSTERIOR APPROACH Left 1/19/2024    Procedure: ARTHROPLASTY, HIP, GIRDLESTONE, POSTERIOR APPROACH;  Surgeon: Bulmaro Tellez MD;  [address];  Service: Orthopedics;  Laterality: Left;    ARTHROPLASTY, HIP, GIRDLESTONE, POSTERIOR APPROACH Left 1/25/2024    Procedure: Irrigation and debridement left hip,;  Surgeon: Juanito Painting MD;  [facility];  Service: Orthopedics;  Laterality: Left;    BACK SURGERY      BREAST BIOPSY      BREAST SURGERY Right     lumpectomy    CAUDAL EPIDURAL STEROID INJECTION N/A 01/28/2022    Procedure: Injection-steroid-epidural-caudal;  Surgeon: Luzmaria Marcelino MD;  Location: [facility] OR;  Service: Pain Management;  Laterality: N/A;    COLONOSCOPY N/A 01/14/2022    Procedure: COLONOSCOPY;  Surgeon: Abby Landers MD;  Location: [facility];  Service: Endoscopy;  Laterality: N/A;    ENDOSCOPIC ULTRASOUND OF UPPER GASTROINTESTINAL TRACT N/A 07/21/2020    Procedure: ULTRASOUND, UPPER GI TRACT, ENDOSCOPIC;  Surgeon: Marcio Nguyễn III, MD;  Location: [facility] ENDO;  Service: Endoscopy;  Laterality: N/A;    ESOPHAGOGASTRODUODENOSCOPY N/A 01/14/2022    Procedure: EGD (ESOPHAGOGASTRODUODENOSCOPY);  Surgeon: Abby Landers MD;  Location: [facility];  Service: Endoscopy;  Laterality: N/A;    ESOPHAGOGASTRODUODENOSCOPY N/A 06/10/2022    Procedure: EGD (ESOPHAGOGASTRODUODENOSCOPY);  Surgeon: Abby Landers MD;  Location: [facility]  ENDO;  Service: Endoscopy;  Laterality: N/A;    EYE SURGERY      cataract    HYSTERECTOMY      INTRAMEDULLARY RODDING OF TROCHANTER OF FEMUR Left 12/11/2018    Procedure: INSERTION, INTRAMEDULLARY SANTOS, FEMUR, TROCHANTER;  Surgeon: Eulalio De La Cruz MD;  Location: [facility] OR;  Service: Orthopedics;  Laterality: Left;    IRRIGATION AND DEBRIDEMENT OF LOWER EXTREMITY Left 1/19/2024    Procedure: IRRIGATION AND DEBRIDEMENT, LOWER EXTREMITY;  Surgeon: Bulmaro Tellez MD;  [facility];  Service: Orthopedics;  Laterality: Left;    REPAIR OF EPIGASTRIC HERNIA N/A 12/7/2023    Procedure: REPAIR, HERNIA, EPIGASTRIC;  Surgeon: Vipul Escobar MD;  Location: [facility] OR;  Service: General;  Laterality: N/A;    SPINAL CORD STIMULATOR IMPLANT  09/18/2013    and removal    SPINE SURGERY  2006    lumbar L2-S1 decompression.    SPINE SURGERY      cervical decompression    TONSILLECTOMY         Review of patient's allergies indicates:  No Known Allergies    Medications:  Medications Prior to Admission   Medication Sig    acetaminophen (TYLENOL) 325 MG tablet Take 650 mg by mouth every 8 (eight) hours as needed for Pain.    amlodipine-benazepril 5-20 mg (LOTREL) 5-20 mg per capsule Take 1 capsule by mouth once daily.    apixaban (ELIQUIS) 5 mg Tab Take 1 tablet (5 mg total) by mouth 2 (two) times daily.    cyclobenzaprine (FLEXERIL) 10 MG tablet Take 1 tablet (10 mg total) by mouth 3 (three) times daily as needed for Muscle spasms.    docusate sodium (COLACE) 100 MG capsule Take 1 capsule (100 mg total) by mouth 2 (two) times daily.    doxycycline (VIBRAMYCIN) 100 MG Cap Take 1 capsule (100 mg total) by mouth every 12 (twelve) hours.    EScitalopram oxalate (LEXAPRO) 20 MG tablet Take 1 tablet (20 mg total) by mouth every evening.    metoprolol tartrate (LOPRESSOR) 50 MG tablet Take 1 tablet (50 mg total) by mouth 2 (two) times daily.    nystatin (MYCOSTATIN) powder Apply to affected area 3 times daily    omeprazole  (PRILOSEC) 40 MG capsule Take 1 capsule (40 mg total) by mouth every morning.    oxyCODONE-acetaminophen (PERCOCET)  mg per tablet Take 1 tablet by mouth every 4 to 6 hours as needed for Pain.    pregabalin (LYRICA) 200 MG Cap Take 1 capsule (200 mg total) by mouth 3 (three) times daily. TAKE 1 CAPSULE(200 MG) BY MOUTH THREE TIMES DAILY    sodium chloride 0.9% SolP 50 mL with DAPTOmycin 500 mg SolR 500 mg Inject 500 mg into the vein once daily. End date 2/29/24    traMADoL (ULTRAM) 50 mg tablet Take 2 tablets by mouth every 12 (twelve) hours as needed for Pain.    valACYclovir (VALTREX) 1000 MG tablet Take 1 tablet (1,000 mg total) by mouth 2 (two) times daily. for 7 days     Antibiotics (From admission, onward)      Start     Stop Route Frequency Ordered    02/15/24 1600  DAPTOmycin (CUBICIN) 675 mg in sodium chloride 0.9% SolP 50 mL IVPB         -- IV Every 24 hours (non-standard times) 02/14/24 2154 02/14/24 2245  doxycycline tablet 100 mg         -- Oral Every 12 hours 02/14/24 2140          Antifungals (From admission, onward)      Start     Stop Route Frequency Ordered    02/15/24 0900  nystatin powder         -- Top 3 times daily 02/14/24 2140          Antivirals (From admission, onward)      None             Immunization History   Administered Date(s) Administered    COVID-19, MRNA, LN-S, PF (Pfizer) (Purple Cap) 07/27/2021, 08/18/2021    Influenza (FLUAD) - Quadrivalent - Adjuvanted - PF *Preferred* (65+) 10/20/2021, 09/29/2023    Influenza - Quadrivalent - PF *Preferred* (6 months and older) 09/22/2011, 09/06/2018    PPD Test 12/13/2018    Pneumococcal Conjugate - 13 Valent 10/20/2021    Pneumococcal Conjugate - 20 Valent 09/29/2023    Td (ADULT) 12/05/2012       Family History       Problem Relation (Age of Onset)    COPD Brother    Coronary artery disease Father    Depression Father    Diabetes Mother, Father, Sister, Brother    Heart disease Father    Hypertension Mother, Father    Irritable  bowel syndrome Mother          Social History     Socioeconomic History    Marital status:    Tobacco Use    Smoking status: Never    Smokeless tobacco: Never   Substance and Sexual Activity    Alcohol use: No    Drug use: No    Sexual activity: Not Currently     Social Determinants of Health     Financial Resource Strain: Low Risk  (1/20/2024)    Overall Financial Resource Strain (CARDIA)     Difficulty of Paying Living Expenses: Not hard at all   Food Insecurity: No Food Insecurity (1/20/2024)    Hunger Vital Sign     Worried About Running Out of Food in the Last Year: Never true     Ran Out of Food in the Last Year: Never true   Transportation Needs: No Transportation Needs (1/20/2024)    PRAPARE - Transportation     Lack of Transportation (Medical): No     Lack of Transportation (Non-Medical): No   Physical Activity: Insufficiently Active (12/2/2022)    Exercise Vital Sign     Days of Exercise per Week: 3 days     Minutes of Exercise per Session: 30 min   Stress: No Stress Concern Present (1/20/2024)    [employer] [place] [employer] - Occupational Stress Questionnaire     Feeling of Stress : Not at all   Social Connections: Moderately Isolated (1/18/2024)    Social Connection and Isolation Panel [NHANES]     Frequency of Communication with Friends and Family: More than three times a week     Attends [employer] Services: Never     Active Member of Clubs or Organizations: No     Attends Club or Organization Meetings: Never     Marital Status:    Housing Stability: Low Risk  (1/20/2024)    Housing Stability Vital Sign     Unable to Pay for Housing in the Last Year: No     Number of Places Lived in the Last Year: 1     Unstable Housing in the Last Year: No     Review of Systems   Constitutional:  Positive for activity change, appetite change and fatigue. Negative for chills and fever.   HENT:  Negative for trouble swallowing.    Respiratory:  Negative for cough and shortness of breath.     Gastrointestinal:  Negative for abdominal distention, blood in stool, diarrhea and vomiting.   Genitourinary:  Negative for dysuria and hematuria.   Musculoskeletal:  Positive for arthralgias and joint swelling. Negative for back pain.   Skin:  Positive for wound.   Allergic/Immunologic: Negative for immunocompromised state.   Psychiatric/Behavioral:  Negative for confusion.      Objective:     Vital Signs (Most Recent):  Temp: 98.8 °F (37.1 °C) (02/15/24 1141)  Pulse: 64 (02/15/24 1141)  Resp: 17 (02/15/24 1141)  BP: 129/60 (02/15/24 1141)  SpO2: (!) 94 % (02/15/24 1141) Vital Signs (24h Range):  Temp:  [98.4 °F (36.9 °C)-99.4 °F (37.4 °C)] 98.8 °F (37.1 °C)  Pulse:  [64-93] 64  Resp:  [16-21] 17  SpO2:  [94 %-99 %] 94 %  BP: (109-167)/(55-74) 129/60     Weight: 67.6 kg (149 lb)  Body mass index is 24.05 kg/m².    Estimated Creatinine Clearance: 64 mL/min (based on SCr of 0.7 mg/dL).     Physical Exam  Constitutional:       Appearance: She is ill-appearing. She is not toxic-appearing.   HENT:      Head: Normocephalic and atraumatic.      Nose: Nose normal.      Mouth/Throat:      Mouth: Mucous membranes are moist.      Pharynx: Oropharynx is clear.   Eyes:      Conjunctiva/sclera: Conjunctivae normal.   Cardiovascular:      Rate and Rhythm: Normal rate and regular rhythm.      Pulses: Normal pulses.      Heart sounds: Normal heart sounds.   Pulmonary:      Effort: Pulmonary effort is normal.      Breath sounds: Normal breath sounds.   Abdominal:      General: Bowel sounds are normal.      Palpations: Abdomen is soft.      Tenderness: There is no guarding or rebound.   Musculoskeletal:         General: Swelling, tenderness and deformity present.      Cervical back: Neck supple.      Comments: Left hip edema, tenderness on palpation, dry bandage over prior incision site  Severely limited ROM   Skin:     General: Skin is warm and dry.      Comments: UE picc c/d/I non tender   Neurological:      Mental Status: She is  alert and oriented to person, place, and time. Mental status is at baseline.          Significant Labs:   Microbiology Results (last 7 days)       Procedure Component Value Units Date/Time    Blood culture [[id number]] Collected: 02/14/24 2241    Order Status: Completed Specimen: Blood Updated: 02/15/24 0715     Blood Culture, Routine No Growth to date    Blood culture [[id number]] Collected: 02/14/24 2241    Order Status: Completed Specimen: Blood Updated: 02/15/24 0715     Blood Culture, Routine No Growth to date    Urine culture [[id number]] Collected: 02/14/24 2232    Order Status: No result Specimen: Urine Updated: 02/14/24 2312          All pertinent labs within the past 24 hours have been reviewed.    Significant Imaging: I have reviewed all pertinent imaging results/findings within the past 24 hours.

## 2024-02-15 NOTE — CARE UPDATE
Froilan Ray is a 78 y.o. 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     Pt will require operative intervention with likely head exchange. Discussed this in detail with the patient and she would like to proceed. Plan for OR today.     Pt does have Hgb 7.8, transfused 2 units overnight. 2 additional units PRBC held. K+ 3.2, Ca 8.5 corrected.     Paul Jimenez MD  PGY-5 Orthopaedic Surgery

## 2024-02-15 NOTE — ASSESSMENT & PLAN NOTE
Froilan Ray is a 78 y.o. female with PMH of MRSA bacteremia and left-sided hip septic arthritis status post Girdlestone placement with antibiotic spacer presenting with failure of the left-sided antibiotic spacer after a ground level fall yesterday.    - nonweightbearing to left lower extremity  - hold chemical DVT prophylaxis at this time, SCDs at all times when not ambulating  - NPO at midnight, we will discuss operative intervention with staff  - Labs:  Hemoglobin 7.2, hematocrit 23.8, platelet count 518, PT 11.6, transferrin 92, .8, vitamin-D 9, urinalysis positive for many bacteria and 1+ leukocyte esterases, all other labs pending at this time.  Blood cultures ordered.  - hospital medicine consulted, recommendations appreciated.  Plan to transition patient to Hospital Medicine hip fracture service tomorrow.  - continue doxycycline and daptomycin per ID recommendations, ID consulted for inpatient management.  Recommendations appreciated

## 2024-02-15 NOTE — NURSING
Pt arrived to unit via EMS. Vital signs as charted. Safety measures in place. PICC in place to RUE at arrival dated 2/9/2024. Neurovascular checks WNL, no c/o numbness or tingling. Patient c/o 10/10 pain at arrival. Pt states she has been unable to void today, bladder scan showed 455 ml. MD aware of pt arrival.

## 2024-02-15 NOTE — ASSESSMENT & PLAN NOTE
Currently controlled. Continue home Metoprolol. Holding anticoagulation given upcoming orthopedic surgery.

## 2024-02-15 NOTE — CARE UPDATE
I have reviewed the chart of Froilan Ray and collaborated with Mayte Rogers MD in the care of the patient who is hospitalized for the following:    Active Hospital Problems    Diagnosis    *Antibiotic Spacer failure status post Girdlestone procedure    Hypocalcemia    Hypoalbuminemia    Vitamin D deficiency    Preoperative examination    Urinary retention    Staphylococcal arthritis of left hip    Chronic heart failure with preserved ejection fraction    Hypokalemia    Paroxysmal atrial fibrillation    Iron deficiency anemia    Essential hypertension    Peripheral neuropathy    Femur fracture, left    Chronic pain with uncomplicated opioid dependence          I have reviewed Froilan Ray with the multidisciplinary team during discharge huddle.       Julia Morales PA-C  Unit Based [facility]

## 2024-02-15 NOTE — ASSESSMENT & PLAN NOTE
Patient undergoing non-emergent non-cardiac surgery.  Patient does not have active cardiac problems  Patient undergoing intermediate risk surgery.  Functional Status: Patient has functional METs- difficult to determine given immobility   Revised cardiac risk index (RCRI) score is 1.         Other Issues:       Patient on long term anticoagulation: Yes      Recent coronary stenting: No    Patient with acceptable cardiac risk with (RCRI score of 1) going for intermediate risk surgery. No absolute contraindications to the proposed surgery at this time. intermediate risk of post-op pulmonary complications.  intermediate risk of post-op delirium.   - Okay to proceed with planned surgery with no additional cardiac testing needed prior to surgery.  - Resume full anticoagulation Apixiban at home dosing on POD # 1 or when okay with primary surgical team.

## 2024-02-15 NOTE — H&P
"[facility] Medicine  History & Physical    Patient Name: Froilan Ray  MRN: [id number]  Patient Class: IP- Inpatient  Admission Date: 2/14/2024  Attending Physician: Bulmaro Tellez MD   Primary Care Provider: Alphonse Boothe III, MD         Patient information was obtained from patient, past medical records, and ER records.     Subjective:     Principal Problem:Femur fracture, left    Chief Complaint: No chief complaint on file.       HPI:   Froilan Ray is a 78 y.o. female with history of L hip surgery, HTN, HLD, CHF, chronic pain on opioids, peripheral neuropathy, and recent admission for MRSA septic arthritis requiring replacement of L hip hardware who was transferred from [facility] for L prosthetic hip fracture.     Patient reports that she was doing relatively well on the morning of presentation, able to ambulate with her walker without major issues. Unfortunately, while walking, her L leg "slipped," causing her to fall to the ground. She immediately noted L hip pain and inability to ambulate. She denies any pre-syncopal symptoms.She presented to the ED, where L hip fx was noted, therefore transferred here for ortho evaluation.     Past Medical History:   Diagnosis Date    Anemia     Arthritis     Bleeding ulcer 07/2016    Chronic pain     DDD (degenerative disc disease), cervical     DDD (degenerative disc disease), lumbar     Depression     Disc degeneration, lumbosacral     Diverticulitis     Encounter for blood transfusion     Hypertension     IBS (irritable bowel syndrome)     Neuropathy of both feet     Seizures     none  since 2017    Umbilical hernia 2020       Past Surgical History:   Procedure Laterality Date    ARTHROPLASTY, HIP, GIRDLESTONE, POSTERIOR APPROACH Left 1/19/2024    Procedure: ARTHROPLASTY, HIP, GIRDLESTONE, POSTERIOR APPROACH;  Surgeon: Bulmaro Tellez MD;  Location: [facility] OR [address] [place] [address];  Service: Orthopedics;  Laterality: Left;    ARTHROPLASTY, HIP, " GIRDLESTONE, POSTERIOR APPROACH Left 1/25/2024    Procedure: Irrigation and debridement left hip,;  Surgeon: Juanito Painting MD;  Location: [facility] OR 2ND FLR;  Service: Orthopedics;  Laterality: Left;    BACK SURGERY      BREAST BIOPSY      BREAST SURGERY Right     lumpectomy    CAUDAL EPIDURAL STEROID INJECTION N/A 01/28/2022    Procedure: Injection-steroid-epidural-caudal;  Surgeon: Luzmaria Marcelino MD;  Location: [facility] OR;  Service: Pain Management;  Laterality: N/A;    COLONOSCOPY N/A 01/14/2022    Procedure: COLONOSCOPY;  Surgeon: Abby Landers MD;  Location: [facility] ENDO;  Service: Endoscopy;  Laterality: N/A;    ENDOSCOPIC ULTRASOUND OF UPPER GASTROINTESTINAL TRACT N/A 07/21/2020    Procedure: ULTRASOUND, UPPER GI TRACT, ENDOSCOPIC;  Surgeon: Marcio Nguyễn III, MD;  Location: [facility] ENDO;  Service: Endoscopy;  Laterality: N/A;    ESOPHAGOGASTRODUODENOSCOPY N/A 01/14/2022    Procedure: EGD (ESOPHAGOGASTRODUODENOSCOPY);  Surgeon: Abby Landers MD;  Location: [facility] ENDO;  Service: Endoscopy;  Laterality: N/A;    ESOPHAGOGASTRODUODENOSCOPY N/A 06/10/2022    Procedure: EGD (ESOPHAGOGASTRODUODENOSCOPY);  Surgeon: Abby Landers MD;  Location: [facility] ENDO;  Service: Endoscopy;  Laterality: N/A;    EYE SURGERY      cataract    HYSTERECTOMY      INTRAMEDULLARY RODDING OF TROCHANTER OF FEMUR Left 12/11/2018    Procedure: INSERTION, INTRAMEDULLARY SANTOS, FEMUR, TROCHANTER;  Surgeon: Eulalio De La Cruz MD;  Location: [facility] OR;  Service: Orthopedics;  Laterality: Left;    IRRIGATION AND DEBRIDEMENT OF LOWER EXTREMITY Left 1/19/2024    Procedure: IRRIGATION AND DEBRIDEMENT, LOWER EXTREMITY;  Surgeon: Bulmaro Tellez MD;  Location: [facility] OR 2ND FLR;  Service: Orthopedics;  Laterality: Left;    REPAIR OF EPIGASTRIC HERNIA N/A 12/7/2023    Procedure: REPAIR, HERNIA, EPIGASTRIC;  Surgeon: Vipul Escobar MD;  Location: [facility] OR;  Service: General;  Laterality: N/A;    SPINAL CORD STIMULATOR IMPLANT   09/18/2013    and removal    SPINE SURGERY  2006    lumbar L2-S1 decompression.    SPINE SURGERY      cervical decompression    TONSILLECTOMY         Review of patient's allergies indicates:  No Known Allergies    Current Facility-Administered Medications on File Prior to Encounter   Medication    [COMPLETED] HYDROmorphone injection 1 mg    [COMPLETED] HYDROmorphone injection 1 mg    [COMPLETED] HYDROmorphone injection 1 mg    [COMPLETED] HYDROmorphone injection 1 mg    [COMPLETED] oxyCODONE-acetaminophen 5-325 mg per tablet 1 tablet    [COMPLETED] oxyCODONE-acetaminophen 5-325 mg per tablet 1 tablet    [DISCONTINUED] cyclobenzaprine tablet 10 mg    [DISCONTINUED] DAPTOmycin (CUBICIN) 675 mg in sodium chloride 0.9% SolP 50 mL IVPB    [DISCONTINUED] EScitalopram oxalate tablet 20 mg    [DISCONTINUED] pantoprazole EC tablet 40 mg    [DISCONTINUED] pregabalin capsule 200 mg     Current Outpatient Medications on File Prior to Encounter   Medication Sig    acetaminophen (TYLENOL) 325 MG tablet Take 650 mg by mouth every 8 (eight) hours as needed for Pain.    amlodipine-benazepril 5-20 mg (LOTREL) 5-20 mg per capsule Take 1 capsule by mouth once daily.    apixaban (ELIQUIS) 5 mg Tab Take 1 tablet (5 mg total) by mouth 2 (two) times daily.    cyclobenzaprine (FLEXERIL) 10 MG tablet Take 1 tablet (10 mg total) by mouth 3 (three) times daily as needed for Muscle spasms.    docusate sodium (COLACE) 100 MG capsule Take 1 capsule (100 mg total) by mouth 2 (two) times daily.    doxycycline (VIBRAMYCIN) 100 MG Cap Take 1 capsule (100 mg total) by mouth every 12 (twelve) hours.    EScitalopram oxalate (LEXAPRO) 20 MG tablet Take 1 tablet (20 mg total) by mouth every evening.    metoprolol tartrate (LOPRESSOR) 50 MG tablet Take 1 tablet (50 mg total) by mouth 2 (two) times daily.    nystatin (MYCOSTATIN) powder Apply to affected area 3 times daily    omeprazole (PRILOSEC) 40 MG capsule Take 1 capsule (40 mg total) by mouth every  morning.    oxyCODONE-acetaminophen (PERCOCET)  mg per tablet Take 1 tablet by mouth every 4 to 6 hours as needed for Pain.    pregabalin (LYRICA) 200 MG Cap Take 1 capsule (200 mg total) by mouth 3 (three) times daily. TAKE 1 CAPSULE(200 MG) BY MOUTH THREE TIMES DAILY    sodium chloride 0.9% SolP 50 mL with DAPTOmycin 500 mg SolR 500 mg Inject 500 mg into the vein once daily. End date 2/29/24    traMADoL (ULTRAM) 50 mg tablet Take 2 tablets by mouth every 12 (twelve) hours as needed for Pain.    valACYclovir (VALTREX) 1000 MG tablet Take 1 tablet (1,000 mg total) by mouth 2 (two) times daily. for 7 days    [DISCONTINUED] pantoprazole (PROTONIX) 40 MG tablet Take 1 tablet (40 mg total) by mouth 2 (two) times daily.     Family History       Problem Relation (Age of Onset)    COPD Brother    Coronary artery disease Father    Depression Father    Diabetes Mother, Father, Sister, Brother    Heart disease Father    Hypertension Mother, Father    Irritable bowel syndrome Mother          Tobacco Use    Smoking status: Never    Smokeless tobacco: Never   Substance and Sexual Activity    Alcohol use: No    Drug use: No    Sexual activity: Not Currently     Review of Systems   All other systems reviewed and are negative.    Objective:     Vital Signs (Most Recent):  Temp: 98.8 °F (37.1 °C) (02/14/24 2126)  Pulse: 92 (02/14/24 2126)  Resp: 18 (02/14/24 2343)  BP: 126/61 (02/14/24 2126)  SpO2: 98 % (02/14/24 2126) Vital Signs (24h Range):  Temp:  [98.7 °F (37.1 °C)-98.8 °F (37.1 °C)] 98.8 °F (37.1 °C)  Pulse:  [] 92  Resp:  [12-21] 18  SpO2:  [95 %-99 %] 98 %  BP: (101-167)/(55-71) 126/61        There is no height or weight on file to calculate BMI.     Physical Exam  Vitals and nursing note reviewed.   Constitutional:       General: She is not in acute distress.     Appearance: She is well-developed. She is not diaphoretic.   HENT:      Head: Normocephalic and atraumatic.   Eyes:      General: No scleral icterus.      Conjunctiva/sclera: Conjunctivae normal.      Comments: Disconjugate gaze (baseline)   Neck:      Vascular: No JVD.   Cardiovascular:      Rate and Rhythm: Normal rate.   Pulmonary:      Effort: Pulmonary effort is normal. No respiratory distress.      Breath sounds: No wheezing.   Genitourinary:     Comments: Boyd in place    Musculoskeletal:      Right lower leg: Edema present.      Left lower leg: Edema present.      Comments: PICC in RUE    Skin:     Coloration: Skin is not jaundiced or pale.   Neurological:      Mental Status: She is alert and oriented to person, place, and time.      Motor: No abnormal muscle tone.   Psychiatric:         Mood and Affect: Mood normal.         Behavior: Behavior normal.                Significant Labs: All pertinent labs within the past 24 hours have been reviewed.  CBC:   Recent Labs   Lab 02/14/24  1137   WBC 10.03   HGB 7.2*   HCT 23.8*   *     CMP:   Recent Labs   Lab 02/14/24  1137      K 4.0      CO2 28   GLU 91   BUN 13   CREATININE 0.8   CALCIUM 7.4*   PROT 5.8*   ALBUMIN 2.0*   BILITOT 0.3   ALKPHOS 137*   AST 20   ALT 11   ANIONGAP 5*       Significant Imaging: I have reviewed all pertinent imaging results/findings within the past 24 hours.  Assessment/Plan:     * Femur fracture, left  Recent revision of L hip replacement due to septic arthritis, and presents with pain following a fall to the left hip. CT shows acute fracture involving screw at junction of left femoral head and neck components of left hip hemiarthroplasty, with superior displacement of the distal fragment. Ortho consulted; will need operative intervention tomorrow. Will place on Hip fx pathway.     Preoperative examination  Patient undergoing non-emergent non-cardiac surgery.  Patient does not have active cardiac problems  Patient undergoing intermediate risk surgery.  Functional Status: Patient has functional METs- difficult to determine given immobility   Revised cardiac risk  index (RCRI) score is 1.         Other Issues:       Patient on long term anticoagulation: Yes      Recent coronary stenting: No    Patient with acceptable cardiac risk with (RCRI score of 1) going for intermediate risk surgery. No absolute contraindications to the proposed surgery at this time. intermediate risk of post-op pulmonary complications.  intermediate risk of post-op delirium.   - Okay to proceed with planned surgery with no additional cardiac testing needed prior to surgery.  - Resume full anticoagulation Apixiban at home dosing on POD # 1 or when okay with primary surgical team.       Urinary retention  Had urinary retention during recent admission, discharged with Boyd and Urology follow-up. Will monitor output and re-attempt voiding trials after surgery.       Staphylococcal arthritis of left hip  Recent MRSA arthritis during recent admission, planned for Daptomycin daily until 2/29 per ID recs. Will continue this.       (HFpEF) heart failure with preserved ejection fraction  Recovered EF on recent TTE. Will monitor volume status and diurese as needed.       Chronic atrial fibrillation  Currently controlled. Continue home Metoprolol. Holding anticoagulation given upcoming orthopedic surgery.     Iron deficiency anemia  Chronically severe, and stable around baseline. Anticipate will need transfusion given orthopedic surgery. Type and screen, prepare RBC order placed by Orthopedics. Monitor.     Peripheral neuropathy  Continue Lyrica.     Essential hypertension  Currently stable. Continue current regimen.     Chronic pain with uncomplicated opioid dependence  Continue home regimen, verified by PDMP with breakthrough meds available given acute fracture.         VTE Risk Mitigation (From admission, onward)           Ordered     IP VTE HIGH RISK PATIENT  Once         02/14/24 2333     Place sequential compression device  Until discontinued         02/14/24 2333                     Advance Care Planning    Patient is Full Code on discussion with her.  Patient's surrogate decision maker is her son.                  Santosh Hoffman MD  Department of Hospital Medicine  [facility] - Surgery

## 2024-02-15 NOTE — SUBJECTIVE & OBJECTIVE
Past Medical History:   Diagnosis Date    Anemia     Arthritis     Bleeding ulcer 07/2016    Chronic pain     DDD (degenerative disc disease), cervical     DDD (degenerative disc disease), lumbar     Depression     Disc degeneration, lumbosacral     Diverticulitis     Encounter for blood transfusion     Hypertension     IBS (irritable bowel syndrome)     Neuropathy of both feet     Seizures     none  since 2017    Umbilical hernia 2020       Past Surgical History:   Procedure Laterality Date    ARTHROPLASTY, HIP, GIRDLESTONE, POSTERIOR APPROACH Left 1/19/2024    Procedure: ARTHROPLASTY, HIP, GIRDLESTONE, POSTERIOR APPROACH;  Surgeon: Bulmaro Tellez MD;  [address];  Service: Orthopedics;  Laterality: Left;    ARTHROPLASTY, HIP, GIRDLESTONE, POSTERIOR APPROACH Left 1/25/2024    Procedure: Irrigation and debridement left hip,;  Surgeon: Juanito Painting MD;  [facility];  Service: Orthopedics;  Laterality: Left;    BACK SURGERY      BREAST BIOPSY      BREAST SURGERY Right     lumpectomy    CAUDAL EPIDURAL STEROID INJECTION N/A 01/28/2022    Procedure: Injection-steroid-epidural-caudal;  Surgeon: Luzmaria Marcelino MD;  Location: [facility] OR;  Service: Pain Management;  Laterality: N/A;    COLONOSCOPY N/A 01/14/2022    Procedure: COLONOSCOPY;  Surgeon: Abby Landers MD;  Location: [facility];  Service: Endoscopy;  Laterality: N/A;    ENDOSCOPIC ULTRASOUND OF UPPER GASTROINTESTINAL TRACT N/A 07/21/2020    Procedure: ULTRASOUND, UPPER GI TRACT, ENDOSCOPIC;  Surgeon: Marcio Nguyễn III, MD;  Location: [facility] ENDO;  Service: Endoscopy;  Laterality: N/A;    ESOPHAGOGASTRODUODENOSCOPY N/A 01/14/2022    Procedure: EGD (ESOPHAGOGASTRODUODENOSCOPY);  Surgeon: Abby Landers MD;  Location: [facility];  Service: Endoscopy;  Laterality: N/A;    ESOPHAGOGASTRODUODENOSCOPY N/A 06/10/2022    Procedure: EGD (ESOPHAGOGASTRODUODENOSCOPY);  Surgeon: Abby Landers MD;  Location: [facility]  ENDO;  Service: Endoscopy;  Laterality: N/A;    EYE SURGERY      cataract    HYSTERECTOMY      INTRAMEDULLARY RODDING OF TROCHANTER OF FEMUR Left 12/11/2018    Procedure: INSERTION, INTRAMEDULLARY SANTOS, FEMUR, TROCHANTER;  Surgeon: Eulalio De La Cruz MD;  Location: [facility] OR;  Service: Orthopedics;  Laterality: Left;    IRRIGATION AND DEBRIDEMENT OF LOWER EXTREMITY Left 1/19/2024    Procedure: IRRIGATION AND DEBRIDEMENT, LOWER EXTREMITY;  Surgeon: Bulmaro Tellez MD;  [facility];  Service: Orthopedics;  Laterality: Left;    REPAIR OF EPIGASTRIC HERNIA N/A 12/7/2023    Procedure: REPAIR, HERNIA, EPIGASTRIC;  Surgeon: Vipul Escobar MD;  Location: [facility] OR;  Service: General;  Laterality: N/A;    SPINAL CORD STIMULATOR IMPLANT  09/18/2013    and removal    SPINE SURGERY  2006    lumbar L2-S1 decompression.    SPINE SURGERY      cervical decompression    TONSILLECTOMY         Review of patient's allergies indicates:  No Known Allergies    Current Facility-Administered Medications on File Prior to Encounter   Medication    [COMPLETED] HYDROmorphone injection 1 mg    [COMPLETED] HYDROmorphone injection 1 mg    [COMPLETED] HYDROmorphone injection 1 mg    [COMPLETED] HYDROmorphone injection 1 mg    [COMPLETED] oxyCODONE-acetaminophen 5-325 mg per tablet 1 tablet    [COMPLETED] oxyCODONE-acetaminophen 5-325 mg per tablet 1 tablet    [DISCONTINUED] cyclobenzaprine tablet 10 mg    [DISCONTINUED] DAPTOmycin (CUBICIN) 675 mg in sodium chloride 0.9% SolP 50 mL IVPB    [DISCONTINUED] EScitalopram oxalate tablet 20 mg    [DISCONTINUED] pantoprazole EC tablet 40 mg    [DISCONTINUED] pregabalin capsule 200 mg     Current Outpatient Medications on File Prior to Encounter   Medication Sig    acetaminophen (TYLENOL) 325 MG tablet Take 650 mg by mouth every 8 (eight) hours as needed for Pain.    amlodipine-benazepril 5-20 mg (LOTREL) 5-20 mg per capsule Take 1 capsule by mouth once daily.    apixaban (ELIQUIS) 5 mg Tab  Take 1 tablet (5 mg total) by mouth 2 (two) times daily.    cyclobenzaprine (FLEXERIL) 10 MG tablet Take 1 tablet (10 mg total) by mouth 3 (three) times daily as needed for Muscle spasms.    docusate sodium (COLACE) 100 MG capsule Take 1 capsule (100 mg total) by mouth 2 (two) times daily.    doxycycline (VIBRAMYCIN) 100 MG Cap Take 1 capsule (100 mg total) by mouth every 12 (twelve) hours.    EScitalopram oxalate (LEXAPRO) 20 MG tablet Take 1 tablet (20 mg total) by mouth every evening.    metoprolol tartrate (LOPRESSOR) 50 MG tablet Take 1 tablet (50 mg total) by mouth 2 (two) times daily.    nystatin (MYCOSTATIN) powder Apply to affected area 3 times daily    omeprazole (PRILOSEC) 40 MG capsule Take 1 capsule (40 mg total) by mouth every morning.    oxyCODONE-acetaminophen (PERCOCET)  mg per tablet Take 1 tablet by mouth every 4 to 6 hours as needed for Pain.    pregabalin (LYRICA) 200 MG Cap Take 1 capsule (200 mg total) by mouth 3 (three) times daily. TAKE 1 CAPSULE(200 MG) BY MOUTH THREE TIMES DAILY    sodium chloride 0.9% SolP 50 mL with DAPTOmycin 500 mg SolR 500 mg Inject 500 mg into the vein once daily. End date 2/29/24    traMADoL (ULTRAM) 50 mg tablet Take 2 tablets by mouth every 12 (twelve) hours as needed for Pain.    valACYclovir (VALTREX) 1000 MG tablet Take 1 tablet (1,000 mg total) by mouth 2 (two) times daily. for 7 days    [DISCONTINUED] pantoprazole (PROTONIX) 40 MG tablet Take 1 tablet (40 mg total) by mouth 2 (two) times daily.     Family History       Problem Relation (Age of Onset)    COPD Brother    Coronary artery disease Father    Depression Father    Diabetes Mother, Father, Sister, Brother    Heart disease Father    Hypertension Mother, Father    Irritable bowel syndrome Mother          Tobacco Use    Smoking status: Never    Smokeless tobacco: Never   Substance and Sexual Activity    Alcohol use: No    Drug use: No    Sexual activity: Not Currently     Review of Systems   All  other systems reviewed and are negative.    Objective:     Vital Signs (Most Recent):  Temp: 98.8 °F (37.1 °C) (02/14/24 2126)  Pulse: 92 (02/14/24 2126)  Resp: 18 (02/14/24 2343)  BP: 126/61 (02/14/24 2126)  SpO2: 98 % (02/14/24 2126) Vital Signs (24h Range):  Temp:  [98.7 °F (37.1 °C)-98.8 °F (37.1 °C)] 98.8 °F (37.1 °C)  Pulse:  [] 92  Resp:  [12-21] 18  SpO2:  [95 %-99 %] 98 %  BP: (101-167)/(55-71) 126/61        There is no height or weight on file to calculate BMI.     Physical Exam  Vitals and nursing note reviewed.   Constitutional:       General: She is not in acute distress.     Appearance: She is well-developed. She is not diaphoretic.   HENT:      Head: Normocephalic and atraumatic.   Eyes:      General: No scleral icterus.     Conjunctiva/sclera: Conjunctivae normal.      Comments: Disconjugate gaze (baseline)   Neck:      Vascular: No JVD.   Cardiovascular:      Rate and Rhythm: Normal rate.   Pulmonary:      Effort: Pulmonary effort is normal. No respiratory distress.      Breath sounds: No wheezing.   Genitourinary:     Comments: Boyd in place    Musculoskeletal:      Right lower leg: Edema present.      Left lower leg: Edema present.      Comments: PICC in RUE    Skin:     Coloration: Skin is not jaundiced or pale.   Neurological:      Mental Status: She is alert and oriented to person, place, and time.      Motor: No abnormal muscle tone.   Psychiatric:         Mood and Affect: Mood normal.         Behavior: Behavior normal.                Significant Labs: All pertinent labs within the past 24 hours have been reviewed.  CBC:   Recent Labs   Lab 02/14/24  1137   WBC 10.03   HGB 7.2*   HCT 23.8*   *     CMP:   Recent Labs   Lab 02/14/24  1137      K 4.0      CO2 28   GLU 91   BUN 13   CREATININE 0.8   CALCIUM 7.4*   PROT 5.8*   ALBUMIN 2.0*   BILITOT 0.3   ALKPHOS 137*   AST 20   ALT 11   ANIONGAP 5*       Significant Imaging: I have reviewed all pertinent imaging  results/findings within the past 24 hours.

## 2024-02-15 NOTE — ANESTHESIA PREPROCEDURE EVALUATION
02/15/2024  Froilan Ray is a 78 y.o., female.    Pre-operative evaluation for Procedure(s) (LRB):  Revision hip antibiotic spacer head exchange, left, lateral, peg board, depuy osteomed in room, vanc, ancef, txa, (Left)    Froilan Ray is a 78 y.o. female     LDA:     Prev airway:     Drips:     Patient Active Problem List   Diagnosis    Chronic pain with uncomplicated opioid dependence    Encounter for postoperative wound check    Uterine mass    Femur fracture, left    Essential hypertension    Peripheral neuropathy    Seizure disorder    Open wound of left heel    Drug-induced constipation    Disease of biliary tract    Severe anemia    Chronic gastric ulcer with bleeding    Iron deficiency anemia    Sepsis due to methicillin resistant Staphylococcus aureus (MRSA)    Paroxysmal atrial fibrillation    Hypokalemia    Spinal stenosis    Uses walker    History of seizures    Anticoagulated    S/P ORIF (open reduction internal fixation) fracture    Osteoporosis    Depression    Closed fracture of acetabulum    Kyphoscoliosis and scoliosis    Strangulated hernia of abdominal wall    Chronic heart failure with preserved ejection fraction    Painful orthopaedic hardware    Staphylococcal arthritis of left hip    MRSA bacteremia    Oral lesion    Urinary retention    Antibiotic Spacer failure status post Girdlestone procedure    Preoperative examination    Hypocalcemia    Hypoalbuminemia    Vitamin D deficiency       Review of patient's allergies indicates:  No Known Allergies     No current facility-administered medications on file prior to encounter.     Current Outpatient Medications on File Prior to Encounter   Medication Sig Dispense Refill    acetaminophen (TYLENOL) 325 MG tablet Take 650 mg by mouth every 8 (eight) hours as needed for Pain.      amlodipine-benazepril 5-20 mg (LOTREL) 5-20 mg per  capsule Take 1 capsule by mouth once daily. 90 capsule 3    apixaban (ELIQUIS) 5 mg Tab Take 1 tablet (5 mg total) by mouth 2 (two) times daily. 60 tablet 11    cyclobenzaprine (FLEXERIL) 10 MG tablet Take 1 tablet (10 mg total) by mouth 3 (three) times daily as needed for Muscle spasms.      docusate sodium (COLACE) 100 MG capsule Take 1 capsule (100 mg total) by mouth 2 (two) times daily. 60 capsule 1    doxycycline (VIBRAMYCIN) 100 MG Cap Take 1 capsule (100 mg total) by mouth every 12 (twelve) hours. 84 capsule 0    EScitalopram oxalate (LEXAPRO) 20 MG tablet Take 1 tablet (20 mg total) by mouth every evening.      metoprolol tartrate (LOPRESSOR) 50 MG tablet Take 1 tablet (50 mg total) by mouth 2 (two) times daily. 90 tablet 1    nystatin (MYCOSTATIN) powder Apply to affected area 3 times daily 30 g 0    omeprazole (PRILOSEC) 40 MG capsule Take 1 capsule (40 mg total) by mouth every morning.      oxyCODONE-acetaminophen (PERCOCET)  mg per tablet Take 1 tablet by mouth every 4 to 6 hours as needed for Pain.      pregabalin (LYRICA) 200 MG Cap Take 1 capsule (200 mg total) by mouth 3 (three) times daily. TAKE 1 CAPSULE(200 MG) BY MOUTH THREE TIMES DAILY      sodium chloride 0.9% SolP 50 mL with DAPTOmycin 500 mg SolR 500 mg Inject 500 mg into the vein once daily. End date 2/29/24      traMADoL (ULTRAM) 50 mg tablet Take 2 tablets by mouth every 12 (twelve) hours as needed for Pain.      valACYclovir (VALTREX) 1000 MG tablet Take 1 tablet (1,000 mg total) by mouth 2 (two) times daily. for 7 days 14 tablet 0    [DISCONTINUED] pantoprazole (PROTONIX) 40 MG tablet Take 1 tablet (40 mg total) by mouth 2 (two) times daily. 60 tablet 4       Past Surgical History:   Procedure Laterality Date    ARTHROPLASTY, HIP, GIRDLESTONE, POSTERIOR APPROACH Left 1/19/2024    Procedure: ARTHROPLASTY, HIP, GIRDLESTONE, POSTERIOR APPROACH;  Surgeon: Bulmaro Tellez MD;  Location: [facility] OR [address] [place] [address];  Service: Orthopedics;   Laterality: Left;    ARTHROPLASTY, HIP, GIRDLESTONE, POSTERIOR APPROACH Left 1/25/2024    Procedure: Irrigation and debridement left hip,;  Surgeon: Juanito Painting MD;  Location: [facility] OR 2ND FLR;  Service: Orthopedics;  Laterality: Left;    BACK SURGERY      BREAST BIOPSY      BREAST SURGERY Right     lumpectomy    CAUDAL EPIDURAL STEROID INJECTION N/A 01/28/2022    Procedure: Injection-steroid-epidural-caudal;  Surgeon: Luzmaria Marcelino MD;  Location: [facility] OR;  Service: Pain Management;  Laterality: N/A;    COLONOSCOPY N/A 01/14/2022    Procedure: COLONOSCOPY;  Surgeon: Abby Landers MD;  Location: [facility] ENDO;  Service: Endoscopy;  Laterality: N/A;    ENDOSCOPIC ULTRASOUND OF UPPER GASTROINTESTINAL TRACT N/A 07/21/2020    Procedure: ULTRASOUND, UPPER GI TRACT, ENDOSCOPIC;  Surgeon: Marcio Nguyễn III, MD;  Location: [facility];  Service: Endoscopy;  Laterality: N/A;    ESOPHAGOGASTRODUODENOSCOPY N/A 01/14/2022    Procedure: EGD (ESOPHAGOGASTRODUODENOSCOPY);  Surgeon: Abby Landers MD;  Location: [facility] ENDO;  Service: Endoscopy;  Laterality: N/A;    ESOPHAGOGASTRODUODENOSCOPY N/A 06/10/2022    Procedure: EGD (ESOPHAGOGASTRODUODENOSCOPY);  Surgeon: Abby Landers MD;  Location: [facility] ENDO;  Service: Endoscopy;  Laterality: N/A;    EYE SURGERY      cataract    HYSTERECTOMY      INTRAMEDULLARY RODDING OF TROCHANTER OF FEMUR Left 12/11/2018    Procedure: INSERTION, INTRAMEDULLARY SANOTS, FEMUR, TROCHANTER;  Surgeon: Eulalio De La Cruz MD;  Location: [facility] OR;  Service: Orthopedics;  Laterality: Left;    IRRIGATION AND DEBRIDEMENT OF LOWER EXTREMITY Left 1/19/2024    Procedure: IRRIGATION AND DEBRIDEMENT, LOWER EXTREMITY;  Surgeon: Bulmaro Tellez MD;  Location: [facility] OR 2ND FLR;  Service: Orthopedics;  Laterality: Left;    REPAIR OF EPIGASTRIC HERNIA N/A 12/7/2023    Procedure: REPAIR, HERNIA, EPIGASTRIC;  Surgeon: Vipul Escobar MD;  Location: [facility] OR;  Service: General;  Laterality: N/A;     SPINAL CORD STIMULATOR IMPLANT  09/18/2013    and removal    SPINE SURGERY  2006    lumbar L2-S1 decompression.    SPINE SURGERY      cervical decompression    TONSILLECTOMY         Social History     Socioeconomic History    Marital status:    Tobacco Use    Smoking status: Never    Smokeless tobacco: Never   Substance and Sexual Activity    Alcohol use: No    Drug use: No    Sexual activity: Not Currently     Social Determinants of Health     Financial Resource Strain: Low Risk  (2/15/2024)    Overall Financial Resource Strain (CARDIA)     Difficulty of Paying Living Expenses: Not hard at all   Food Insecurity: No Food Insecurity (2/15/2024)    Hunger Vital Sign     Worried About Running Out of Food in the Last Year: Never true     Ran Out of Food in the Last Year: Never true   Transportation Needs: No Transportation Needs (2/15/2024)    PRAPARE - Transportation     Lack of Transportation (Medical): No     Lack of Transportation (Non-Medical): No   Physical Activity: Inactive (2/15/2024)    Exercise Vital Sign     Days of Exercise per Week: 0 days     Minutes of Exercise per Session: 0 min   Stress: No Stress Concern Present (2/15/2024)    [place] [employer] - Occupational Stress Questionnaire     Feeling of Stress : Not at all   Social Connections: Moderately Isolated (2/15/2024)    Social Connection and Isolation Panel [NHANES]     Frequency of Communication with Friends and Family: More than three times a week     Frequency of Social Gatherings with Friends and Family: More than three times a week     Attends Denominational Services: Never     Active Member of Clubs or Organizations: No     Attends Club or Organization Meetings: Never     Marital Status:    Housing Stability: Low Risk  (2/15/2024)    Housing Stability Vital Sign     Unable to Pay for Housing in the Last Year: No     Number of Places Lived in the Last Year: 1     Unstable Housing in the Last Year: No         Vital  Signs Range (Last 24H):  Temp:  [36.6 °C (97.9 °F)-37.4 °C (99.4 °F)]   Pulse:  [64-93]   Resp:  [16-20]   BP: (115-167)/(59-75)   SpO2:  [94 %-99 %]       CBC:   Recent Labs     24  1137 02/15/24  0518   WBC 10.03 12.47   RBC 2.54* 2.68*   HGB 7.2* 7.8*   HCT 23.8* 25.0*   * 481*   MCV 94 93   MCH 28.3 29.1   MCHC 30.3* 31.2*       CMP:   Recent Labs     24  1137 24  2241 02/15/24  0518     --  139   K 4.0  --  3.2*     --  111*   CO2 28  --  23   BUN 13  --  9   CREATININE 0.8  --  0.7   GLU 91  --  81   MG  --  1.6  --    PHOS  --  2.3*  --    CALCIUM 7.4*  --  6.3*   ALBUMIN 2.0*  --  1.2*   PROT 5.8*  --  4.6*   ALKPHOS 137*  --  114   ALT 11  --  10   AST 20  --  19   BILITOT 0.3  --  0.4       INR  Recent Labs     24  1137   INR 1.0           Diagnostic Studies:      EKD Echo:          Pre-op Assessment    I have reviewed the Patient Summary Reports.    I have reviewed the NPO Status.      Review of Systems  Anesthesia Hx:  No problems with previous Anesthesia   History of prior surgery of interest to airway management or planning:  Previous anesthesia: General        Denies Family Hx of Anesthesia complications.    Denies Personal Hx of Anesthesia complications.                    Social:  Non-Smoker, Social Alcohol Use       Hematology/Oncology:    Oncology Normal    -- Anemia:               Hematology Comments: On apixaban for atrial fib, last took a few days ago                    Cardiovascular:     Hypertension, well controlled   Denies MI.        Denies Angina. CHF                                 Pulmonary:  Pulmonary Normal   Denies COPD.  Denies Asthma.   Denies Shortness of breath.  Denies Recent URI.  Denies Sleep Apnea.                Renal/:   Denies Chronic Renal Disease.                Hepatic/GI:   PUD,   Denies Liver Disease.            Musculoskeletal:  Arthritis               Neurological:       Seizures (>20 yrs ago. No recent seizures)                                 Endocrine:  Endocrine Normal Denies Diabetes.           Psych:  Psychiatric History  depression                Physical Exam  General: Cooperative, Alert and Oriented    Airway:  Mallampati: II   Mouth Opening: Normal  TM Distance: Normal  Tongue: Normal  Neck ROM: Normal ROM    Dental:    Chest/Lungs:  Normal Respiratory Rate    Skin:  Pallor       Anesthesia Plan  Type of Anesthesia, risks & benefits discussed:    Anesthesia Type: Gen ETT  Intra-op Monitoring Plan: Standard ASA Monitors  Induction:  IV  Informed Consent: Informed consent signed with the Patient and all parties understand the risks and agree with anesthesia plan.  All questions answered.   ASA Score: 3  Day of Surgery Review of History & Physical: H&P Update referred to the surgeon/provider.    Ready For Surgery From Anesthesia Perspective.     .

## 2024-02-15 NOTE — ASSESSMENT & PLAN NOTE
Recent MRSA arthritis during recent admission, planned for Daptomycin daily until 2/29 per ID recs. Will continue this.

## 2024-02-15 NOTE — ASSESSMENT & PLAN NOTE
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 CT shows acute fracture involving screw at junction of left femoral head and neck components of left hip hemiarthroplasty, with superior displacement of the distal fragment. Ortho consulted; will need operative intervention tomorrow. Will place on Hip fx pathway.

## 2024-02-15 NOTE — HPI
"Froilan Ray is a 78 y.o. female with history of L hip surgery, HTN, HLD, CHF, chronic pain on opioids, peripheral neuropathy, and recent admission for MRSA septic arthritis requiring replacement of L hip hardware who was transferred from [facility] for L prosthetic hip fracture.     Patient reports that she was doing relatively well on the morning of presentation, able to ambulate with her walker without major issues. Unfortunately, while walking, her L leg "slipped," causing her to fall to the ground. She immediately noted L hip pain and inability to ambulate. She denies any pre-syncopal symptoms.She presented to the ED, where L hip fx was noted, therefore transferred here for ortho evaluation.   "

## 2024-02-15 NOTE — NURSING
Nurses Note -- 4 Eyes      2/15/2024   5:24 AM      Skin assessed during: Admit      [] No Altered Skin Integrity Present    []Prevention Measures Documented      [x] Yes- Altered Skin Integrity Present or Discovered   [x] LDA Added if Not in Epic (Describe Wound)   [] New Altered Skin Integrity was Present on Admit and Documented in LDA   [] Wound Image Taken    Wound Care Consulted? Yes    Attending Nurse:  Abigail Lo RN     Second RN/Staff Member:   Evelyn Mccoy LPN

## 2024-02-15 NOTE — H&P
[facility] - Surgery  Orthopedics  H&P    Patient Name: Froilan Ray  MRN: [id number]  Admission Date: 2/14/2024  Primary Care Provider: Alphonse Boothe III, MD    Subjective:     Principal Problem:Status post Girdlestone procedure    Chief Complaint: No chief complaint on file.       HPI: Froilan Ray is a 78 y.o. 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  She has been receiving daptomycin per her PICC line as well as oral doxycycline. Patient denies any head trauma or LOC. Patient denies new numbness and tingling.  She endorses chronic neuropathy of the feet bilaterally. Denies any other musculoskeletal pain or injuries. Uses a walker at home.  Takes Eliquis. Last ate yesterday.  They deny IV drug use.   They deny tobacco use.   They deny alcohol use.   They deny immunosuppressant medications.  They deny chemotherapy.  They deny radiation therapy.     Past Medical History:   Diagnosis Date    Anemia     Arthritis     Bleeding ulcer 07/2016    Chronic pain     DDD (degenerative disc disease), cervical     DDD (degenerative disc disease), lumbar     Depression     Disc degeneration, lumbosacral     Diverticulitis     Encounter for blood transfusion     Hypertension     IBS (irritable bowel syndrome)     Neuropathy of both feet     Seizures     none  since 2017    Umbilical hernia 2020       Past Surgical History:   Procedure Laterality Date    ARTHROPLASTY, HIP, GIRDLESTONE, POSTERIOR APPROACH Left 1/19/2024    Procedure: ARTHROPLASTY, HIP, GIRDLESTONE, POSTERIOR APPROACH;  Surgeon: Bulmaro Tellez MD;  Location: [facility] OR [address] [place] [address];   Service: Orthopedics;  Laterality: Left;    ARTHROPLASTY, HIP, GIRDLESTONE, POSTERIOR APPROACH Left 1/25/2024    Procedure: Irrigation and debridement left hip,;  Surgeon: Juanito Painting MD;  Location: [facility] OR 2ND FLR;  Service: Orthopedics;  Laterality: Left;    BACK SURGERY      BREAST BIOPSY      BREAST SURGERY Right     lumpectomy    CAUDAL EPIDURAL STEROID INJECTION N/A 01/28/2022    Procedure: Injection-steroid-epidural-caudal;  Surgeon: Luzmaria Marcelino MD;  Location: [facility] OR;  Service: Pain Management;  Laterality: N/A;    COLONOSCOPY N/A 01/14/2022    Procedure: COLONOSCOPY;  Surgeon: Abby Landers MD;  Location: [facility] ENDO;  Service: Endoscopy;  Laterality: N/A;    ENDOSCOPIC ULTRASOUND OF UPPER GASTROINTESTINAL TRACT N/A 07/21/2020    Procedure: ULTRASOUND, UPPER GI TRACT, ENDOSCOPIC;  Surgeon: Marcio Nguyễn III, MD;  Location: [facility];  Service: Endoscopy;  Laterality: N/A;    ESOPHAGOGASTRODUODENOSCOPY N/A 01/14/2022    Procedure: EGD (ESOPHAGOGASTRODUODENOSCOPY);  Surgeon: Abby Landers MD;  Location: [facility] ENDO;  Service: Endoscopy;  Laterality: N/A;    ESOPHAGOGASTRODUODENOSCOPY N/A 06/10/2022    Procedure: EGD (ESOPHAGOGASTRODUODENOSCOPY);  Surgeon: Abby Landers MD;  Location: [facility] ENDO;  Service: Endoscopy;  Laterality: N/A;    EYE SURGERY      cataract    HYSTERECTOMY      INTRAMEDULLARY RODDING OF TROCHANTER OF FEMUR Left 12/11/2018    Procedure: INSERTION, INTRAMEDULLARY SANTOS, FEMUR, TROCHANTER;  Surgeon: Eulalio De La Cruz MD;  Location: [facility] OR;  Service: Orthopedics;  Laterality: Left;    IRRIGATION AND DEBRIDEMENT OF LOWER EXTREMITY Left 1/19/2024    Procedure: IRRIGATION AND DEBRIDEMENT, LOWER EXTREMITY;  Surgeon: Bulmaro Tellez MD;  Location: [facility] OR 2ND FLR;  Service: Orthopedics;  Laterality: Left;    REPAIR OF EPIGASTRIC HERNIA N/A 12/7/2023    Procedure: REPAIR, HERNIA, EPIGASTRIC;  Surgeon: Vipul Escobar MD;  Location: [facility] OR;  Service:  General;  Laterality: N/A;    SPINAL CORD STIMULATOR IMPLANT  09/18/2013    and removal    SPINE SURGERY  2006    lumbar L2-S1 decompression.    SPINE SURGERY      cervical decompression    TONSILLECTOMY         Review of patient's allergies indicates:  No Known Allergies    Current Facility-Administered Medications   Medication    0.9%  NaCl infusion    acetaminophen tablet 1,000 mg    amlodipine-benazepril 5-20 mg per capsule 1 capsule    bisacodyL suppository 10 mg    cyclobenzaprine tablet 10 mg    DAPTOmycin (CUBICIN) 675 mg in sodium chloride 0.9% SolP 50 mL IVPB    docusate sodium capsule 100 mg    doxycycline tablet 100 mg    EScitalopram oxalate tablet 20 mg    melatonin tablet 6 mg    metoprolol tartrate (LOPRESSOR) tablet 50 mg    oxyCODONE immediate release tablet 5 mg    And    oxyCODONE immediate release tablet Tab 10 mg    And    morphine injection 2 mg    nystatin powder    ondansetron injection 4 mg    pregabalin capsule 200 mg    sodium chloride 0.9% flush 10 mL     Family History       Problem Relation (Age of Onset)    COPD Brother    Coronary artery disease Father    Depression Father    Diabetes Mother, Father, Sister, Brother    Heart disease Father    Hypertension Mother, Father    Irritable bowel syndrome Mother          Tobacco Use    Smoking status: Never    Smokeless tobacco: Never   Substance and Sexual Activity    Alcohol use: No    Drug use: No    Sexual activity: Not Currently     ROS    Constitutional: negative for fevers  Eyes: negative visual changes  ENT: negative for hearing loss  Respiratory: negative for dyspnea  Cardiovascular: negative for chest pain  Gastrointestinal: negative for abdominal pain  Genitourinary: negative for dysuria  Neurological: negative for headaches  Behavioral/Psych: negative for hallucinations  Endocrine: negative for temperature intolerance      Objective:     Vital Signs (Most Recent):  Temp: 99.4 °F (37.4 °C) (02/15/24 0024)  Pulse: 70 (02/15/24  "0024)  Resp: 18 (02/15/24 0024)  BP: (!) 119/59 (02/15/24 0024)  SpO2: 95 % (02/15/24 0024) Vital Signs (24h Range):  Temp:  [98.7 °F (37.1 °C)-99.4 °F (37.4 °C)] 99.4 °F (37.4 °C)  Pulse:  [] 70  Resp:  [12-21] 18  SpO2:  [95 %-99 %] 95 %  BP: (101-167)/(55-71) 119/59           There is no height or weight on file to calculate BMI.    No intake or output data in the 24 hours ending 02/15/24 0035     Ortho/SPM Exam     Gen:  No acute distress, well-developed, well nourished.  CV:  Peripherally well-perfused. 2+ radial pulses, symmetric.  Respiratory:  Normal respiratory effort. No accessory muscle use.   Head/Neck:  Normocephalic.  Atraumatic. Sclera anicteric. TM. Neck supple.  Neuro: CN 2-12 grossly intact. No FND. Awake. Alert. Oriented to person, place, time, and situation.   Abdomen: Soft, NTND.      MSK:  Left Upper Extremity  Inspection  - Skin intact throughout, no open wounds  - No swelling  - No ecchymosis, erythema, or signs of cellulitis  Palpation  - NonTTP throughout, no palpable abnormality   Range of motion  - AROM and PROM of the shoulder, elbow, wrist, and hand intact  Stability  - No evidence of joint dislocation or abnormal laxity   Neurovascular  - AIN/PIN/Radial/Median/Ulnar Nerves assessed in isolation without deficit  - Able to give thumbs up, make "OK" sign, cross IF/LF, abduct/adduct fingers, make fist  - SILT throughout  - Compartments soft  - Radial artery palpated  - Capillary Refill <3s  - Muscle tone normal    Right Upper Extremity  Inspection  - Skin intact throughout, no open wounds  - No swelling  - No ecchymosis, erythema, or signs of cellulitis  Palpation  - NonTTP throughout, no palpable abnormality   Range of motion  - AROM and PROM of the shoulder, elbow, wrist, and hand intact  Stability  - No evidence of joint dislocation or abnormal laxity  Neurovascular  - AIN/PIN/Radial/Median/Ulnar Nerves assessed in isolation without deficit  - Able to give thumbs up, make "OK" " sign, cross IF/LF, abduct/adduct fingers, make fist  - SILT throughout  - Compartments soft  - Radial artery palpated  - Capillary Refill <3s  - Muscle tone normal      Left Lower Extremity  Inspection  - Skin intact throughout, no scars present  - No swelling  - No ecchymosis, erythema, or signs of cellulitis  - TTP at hip/proximal femur  - No tenderness to palpation of middle or distal femur  - No tenderness to palpation of proximal, middle, or distal aspects of tibia or fibula  - No tenderness to palpation of foot  - Pain with any ROM at hip  - Full painless ROM of knee and ankle  - Compartments soft  - SILT Sa/Vargas/DP/SP/T  - Motor intact EHL/FHL/TA/Gastroc  - 2+ DP  - Brisk capillary refill       Right Lower Extremity  Inspection  - Skin intact throughout, no open wounds  - No swelling  - No ecchymosis, erythema, or signs of cellulitis  Palpation  - NonTTP throughout, no palpable abnormality   Range of motion  - AROM and PROM of the hip, knee, ankle, and foot intact  Stability  - No evidence of joint dislocation or abnormal laxity  Neurovascular  - TA/EHL/Gastroc/FHL assessed in isolation without deficit  - SILT throughout  - Compartments soft  - DP palpated   - Capillary Refill <3s  - Negative Log roll  - Negative Stinchfield  - Muscle tone normal    Spine/pelvis/axial body:  Patient denies any new tenderness to palpation of her back, however has chronic pain over her thoracic spine where she has noticeable curve that she states has been present since childhood  No pain with compression of pelvis  No chest wall or abdominal tenderness  No decubitus ulcers  Muscle tone normal      Significant Labs: CBC:   Recent Labs   Lab 02/14/24  1137   WBC 10.03   HGB 7.2*   HCT 23.8*   *     CMP:   Recent Labs   Lab 02/14/24  1137      K 4.0      CO2 28   GLU 91   BUN 13   CREATININE 0.8   CALCIUM 7.4*   PROT 5.8*   ALBUMIN 2.0*   BILITOT 0.3   ALKPHOS 137*   AST 20   ALT 11   ANIONGAP 5*     Coagulation:    Recent Labs   Lab 02/14/24  1137   LABPROT 11.6   INR 1.0     CRP:   Recent Labs   Lab 02/14/24  2241   .8*     All pertinent labs within the past 24 hours have been reviewed.    Significant Imaging: CT: I have reviewed all pertinent results/findings and my personal findings are:  CT pelvis shows hardware failure of left-sided antibiotic spacer with significant displacement of the femur.  X-Ray: I have reviewed all pertinent results/findings and my personal findings are:  X-ray pelvis shows left-sided antibiotic spacer hardware failure.  I have reviewed and interpreted all pertinent imaging results/findings.  Assessment/Plan:     * Antibiotic Spacer failure status post Girdlestone procedure  Froilan Ray is a 78 y.o. female with PMH of MRSA bacteremia and left-sided hip septic arthritis status post Girdlestone placement with antibiotic spacer presenting with failure of the left-sided antibiotic spacer after a ground level fall yesterday.    - nonweightbearing to left lower extremity  - hold chemical DVT prophylaxis at this time, SCDs at all times when not ambulating  - NPO at midnight, we will discuss operative intervention with staff  - Labs:  Hemoglobin 7.2, hematocrit 23.8, platelet count 518, PT 11.6, transferrin 92, .8, vitamin-D 9, urinalysis positive for many bacteria and 1+ leukocyte esterases, all other labs pending at this time.  Blood cultures ordered.  - hospital medicine consulted, recommendations appreciated.  Plan to transition patient to Hospital Medicine hip fracture service tomorrow.  - continue doxycycline and daptomycin per ID recommendations, ID consulted for inpatient management.  Recommendations appreciated          ROCIO DRUMMOND MD  Orthopedics  [facility] - Surgery

## 2024-02-15 NOTE — ASSESSMENT & PLAN NOTE
Chronically severe, and stable around baseline. Anticipate will need transfusion given orthopedic surgery. Type and screen, prepare RBC order placed by Orthopedics. Monitor.

## 2024-02-15 NOTE — ASSESSMENT & PLAN NOTE
Had urinary retention during recent admission, discharged with [facility] and Urology follow-up. Will monitor output and re-attempt voiding trials after surgery.

## 2024-02-16 PROBLEM — Z01.818 PREOPERATIVE EXAMINATION: Status: RESOLVED | Noted: 2024-02-14 | Resolved: 2024-02-16

## 2024-02-16 PROBLEM — R82.71 BACTERIURIA: Status: ACTIVE | Noted: 2024-02-16

## 2024-02-16 LAB
ALBUMIN SERPL BCP-MCNC: 1.3 G/DL (ref 3.5–5.2)
ALP SERPL-CCNC: 123 U/L (ref 55–135)
ALT SERPL W/O P-5'-P-CCNC: 11 U/L (ref 10–44)
ANION GAP SERPL CALC-SCNC: 6 MMOL/L (ref 8–16)
AST SERPL-CCNC: 18 U/L (ref 10–40)
BASOPHILS # BLD AUTO: 0.02 K/UL (ref 0–0.2)
BASOPHILS NFR BLD: 0.3 % (ref 0–1.9)
BILIRUB SERPL-MCNC: 0.3 MG/DL (ref 0.1–1)
BUN SERPL-MCNC: 12 MG/DL (ref 8–23)
CALCIUM SERPL-MCNC: 7.6 MG/DL (ref 8.7–10.5)
CHLORIDE SERPL-SCNC: 107 MMOL/L (ref 95–110)
CO2 SERPL-SCNC: 23 MMOL/L (ref 23–29)
CREAT SERPL-MCNC: 0.7 MG/DL (ref 0.5–1.4)
CRP SERPL-MCNC: 117.4 MG/L (ref 0–8.2)
DIFFERENTIAL METHOD BLD: ABNORMAL
EOSINOPHIL # BLD AUTO: 0 K/UL (ref 0–0.5)
EOSINOPHIL NFR BLD: 0 % (ref 0–8)
ERYTHROCYTE [DISTWIDTH] IN BLOOD BY AUTOMATED COUNT: 16.5 % (ref 11.5–14.5)
EST. GFR  (NO RACE VARIABLE): >60 ML/MIN/1.73 M^2
GLUCOSE SERPL-MCNC: 120 MG/DL (ref 70–110)
GLUCOSE SERPL-MCNC: 152 MG/DL (ref 70–110)
HCO3 UR-SCNC: 20.7 MMOL/L (ref 24–28)
HCT VFR BLD AUTO: 30.6 % (ref 37–48.5)
HCT VFR BLD CALC: 33 %PCV (ref 36–54)
HGB BLD-MCNC: 10.1 G/DL (ref 12–16)
IMM GRANULOCYTES # BLD AUTO: 0.06 K/UL (ref 0–0.04)
IMM GRANULOCYTES NFR BLD AUTO: 0.8 % (ref 0–0.5)
LYMPHOCYTES # BLD AUTO: 0.6 K/UL (ref 1–4.8)
LYMPHOCYTES NFR BLD: 7.6 % (ref 18–48)
MAGNESIUM SERPL-MCNC: 1.6 MG/DL (ref 1.6–2.6)
MCH RBC QN AUTO: 29.8 PG (ref 27–31)
MCHC RBC AUTO-ENTMCNC: 33 G/DL (ref 32–36)
MCV RBC AUTO: 90 FL (ref 82–98)
MONOCYTES # BLD AUTO: 0.1 K/UL (ref 0.3–1)
MONOCYTES NFR BLD: 1.5 % (ref 4–15)
NEUTROPHILS # BLD AUTO: 7 K/UL (ref 1.8–7.7)
NEUTROPHILS NFR BLD: 89.8 % (ref 38–73)
NRBC BLD-RTO: 0 /100 WBC
PCO2 BLDA: 39.7 MMHG (ref 35–45)
PH SMN: 7.32 [PH] (ref 7.35–7.45)
PHOSPHATE SERPL-MCNC: 3.1 MG/DL (ref 2.7–4.5)
PLATELET # BLD AUTO: 436 K/UL (ref 150–450)
PMV BLD AUTO: 9.3 FL (ref 9.2–12.9)
PO2 BLDA: 48 MMHG (ref 40–60)
POC BE: -5 MMOL/L
POC IONIZED CALCIUM: 1.12 MMOL/L (ref 1.06–1.42)
POC SATURATED O2: 80 % (ref 95–100)
POC TCO2: 22 MMOL/L (ref 24–29)
POTASSIUM BLD-SCNC: 4 MMOL/L (ref 3.5–5.1)
POTASSIUM SERPL-SCNC: 4.4 MMOL/L (ref 3.5–5.1)
PROT SERPL-MCNC: 5 G/DL (ref 6–8.4)
RBC # BLD AUTO: 3.39 M/UL (ref 4–5.4)
SAMPLE: ABNORMAL
SODIUM BLD-SCNC: 138 MMOL/L (ref 136–145)
SODIUM SERPL-SCNC: 136 MMOL/L (ref 136–145)
WBC # BLD AUTO: 7.81 K/UL (ref 3.9–12.7)

## 2024-02-16 PROCEDURE — 25000003 PHARM REV CODE 250

## 2024-02-16 PROCEDURE — 97162 PT EVAL MOD COMPLEX 30 MIN: CPT

## 2024-02-16 PROCEDURE — 97112 NEUROMUSCULAR REEDUCATION: CPT

## 2024-02-16 PROCEDURE — 85025 COMPLETE CBC W/AUTO DIFF WBC: CPT

## 2024-02-16 PROCEDURE — 97165 OT EVAL LOW COMPLEX 30 MIN: CPT

## 2024-02-16 PROCEDURE — 84100 ASSAY OF PHOSPHORUS: CPT

## 2024-02-16 PROCEDURE — 99233 SBSQ HOSP IP/OBS HIGH 50: CPT | Mod: GC,,, | Performed by: STUDENT IN AN ORGANIZED HEALTH CARE EDUCATION/TRAINING PROGRAM

## 2024-02-16 PROCEDURE — 97530 THERAPEUTIC ACTIVITIES: CPT

## 2024-02-16 PROCEDURE — 25000003 PHARM REV CODE 250: Performed by: STUDENT IN AN ORGANIZED HEALTH CARE EDUCATION/TRAINING PROGRAM

## 2024-02-16 PROCEDURE — 63600175 PHARM REV CODE 636 W HCPCS: Performed by: STUDENT IN AN ORGANIZED HEALTH CARE EDUCATION/TRAINING PROGRAM

## 2024-02-16 PROCEDURE — 83735 ASSAY OF MAGNESIUM: CPT

## 2024-02-16 PROCEDURE — 11000001 HC ACUTE MED/SURG PRIVATE ROOM

## 2024-02-16 PROCEDURE — 86140 C-REACTIVE PROTEIN: CPT

## 2024-02-16 PROCEDURE — 25000003 PHARM REV CODE 250: Performed by: HOSPITALIST

## 2024-02-16 PROCEDURE — 63600175 PHARM REV CODE 636 W HCPCS

## 2024-02-16 PROCEDURE — 80053 COMPREHEN METABOLIC PANEL: CPT

## 2024-02-16 RX ADMIN — CEFAZOLIN 2 G: 2 INJECTION, POWDER, FOR SOLUTION INTRAMUSCULAR; INTRAVENOUS at 10:02

## 2024-02-16 RX ADMIN — CEFTRIAXONE SODIUM 2 G: 2 INJECTION, POWDER, FOR SOLUTION INTRAMUSCULAR; INTRAVENOUS at 04:02

## 2024-02-16 RX ADMIN — CEFAZOLIN 2 G: 2 INJECTION, POWDER, FOR SOLUTION INTRAMUSCULAR; INTRAVENOUS at 02:02

## 2024-02-16 RX ADMIN — PREGABALIN 200 MG: 150 CAPSULE ORAL at 09:02

## 2024-02-16 RX ADMIN — ESCITALOPRAM OXALATE 20 MG: 20 TABLET ORAL at 09:02

## 2024-02-16 RX ADMIN — OXYCODONE HYDROCHLORIDE 10 MG: 10 TABLET ORAL at 11:02

## 2024-02-16 RX ADMIN — NYSTATIN: 100000 POWDER TOPICAL at 03:02

## 2024-02-16 RX ADMIN — ACETAMINOPHEN 1000 MG: 500 TABLET ORAL at 06:02

## 2024-02-16 RX ADMIN — NYSTATIN: 100000 POWDER TOPICAL at 09:02

## 2024-02-16 RX ADMIN — ACETAMINOPHEN 1000 MG: 500 TABLET ORAL at 12:02

## 2024-02-16 RX ADMIN — BISACODYL 10 MG: 10 SUPPOSITORY RECTAL at 01:02

## 2024-02-16 RX ADMIN — APIXABAN 2.5 MG: 2.5 TABLET, FILM COATED ORAL at 09:02

## 2024-02-16 RX ADMIN — CHOLECALCIFEROL TAB 25 MCG (1000 UNIT) 1000 UNITS: 25 TAB at 09:02

## 2024-02-16 RX ADMIN — DOCUSATE SODIUM 100 MG: 100 CAPSULE, LIQUID FILLED ORAL at 09:02

## 2024-02-16 RX ADMIN — PREGABALIN 200 MG: 150 CAPSULE ORAL at 04:02

## 2024-02-16 RX ADMIN — CALCIUM CARBONATE (ANTACID) CHEW TAB 500 MG 1000 MG: 500 CHEW TAB at 09:02

## 2024-02-16 RX ADMIN — OXYCODONE HYDROCHLORIDE 10 MG: 10 TABLET ORAL at 04:02

## 2024-02-16 RX ADMIN — POLYETHYLENE GLYCOL 3350 17 G: 17 POWDER, FOR SOLUTION ORAL at 10:02

## 2024-02-16 RX ADMIN — ACETAMINOPHEN 1000 MG: 500 TABLET ORAL at 11:02

## 2024-02-16 RX ADMIN — MORPHINE SULFATE 2 MG: 2 INJECTION, SOLUTION INTRAMUSCULAR; INTRAVENOUS at 07:02

## 2024-02-16 RX ADMIN — DAPTOMYCIN 675 MG: 350 INJECTION, POWDER, LYOPHILIZED, FOR SOLUTION INTRAVENOUS at 12:02

## 2024-02-16 NOTE — SUBJECTIVE & OBJECTIVE
"Principal Problem:Status post Girdlestone procedure    Principal Orthopedic Problem: s/p left hip revision antibiotic spacer placement on 2/15    Interval History: NAEON. VSS. L hip pain controlled. Drain output 160cc ss output since OR. Hgb 10.1.    Review of patient's allergies indicates:  No Known Allergies    Current Facility-Administered Medications   Medication    acetaminophen tablet 1,000 mg    apixaban tablet 2.5 mg    bisacodyL suppository 10 mg    calcium carbonate 200 mg calcium (500 mg) chewable tablet 1,000 mg    ceFAZolin 2 g in dextrose 5 % in water (D5W) 50 mL IVPB (MB+)    cyclobenzaprine tablet 10 mg    DAPTOmycin (CUBICIN) 675 mg in sodium chloride 0.9% SolP 50 mL IVPB    docusate sodium capsule 100 mg    ergocalciferol capsule 50,000 Units    EScitalopram oxalate tablet 20 mg    melatonin tablet 6 mg    oxyCODONE immediate release tablet 5 mg    And    oxyCODONE immediate release tablet Tab 10 mg    And    morphine injection 2 mg    nystatin powder    ondansetron injection 4 mg    polyethylene glycol packet 17 g    pregabalin capsule 200 mg    sodium chloride 0.9% flush 10 mL    sodium chloride 0.9% flush 10 mL    vitamin D 1000 units tablet 1,000 Units     Objective:     Vital Signs (Most Recent):  Temp: 98.3 °F (36.8 °C) (02/16/24 0445)  Pulse: 65 (02/16/24 0445)  Resp: 18 (02/16/24 0445)  BP: (!) 108/58 (02/16/24 0445)  SpO2: 97 % (02/16/24 0445) Vital Signs (24h Range):  Temp:  [97.9 °F (36.6 °C)-98.8 °F (37.1 °C)] 98.3 °F (36.8 °C)  Pulse:  [61-83] 65  Resp:  [11-22] 18  SpO2:  [94 %-100 %] 97 %  BP: ()/(53-82) 108/58     Weight: 67.9 kg (149 lb 11.1 oz)  Height: 5' 6" (167.6 cm)  Body mass index is 24.16 kg/m².      Intake/Output Summary (Last 24 hours) at 2/16/2024 0711  Last data filed at 2/16/2024 0608  Gross per 24 hour   Intake 2769.17 ml   Output 1160 ml   Net 1609.17 ml        Ortho/SPM Exam     Awake/alert/oriented x3, No acute distress, Afebrile, Vital signs stable  Good " inspiratory effort with unlaboured breathing  Dressings c/d/i  NVI in operative limb     Significant Labs: All pertinent labs within the past 24 hours have been reviewed.    Significant Imaging: I have reviewed all pertinent imaging results/findings.

## 2024-02-16 NOTE — ASSESSMENT & PLAN NOTE
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 CT shows acute fracture involving screw at junction of left femoral head and neck components of left hip hemiarthroplasty, with superior displacement of the distal fragment. Ortho consulted and had revision of spacer on 2/15, Revision placement of articulating cement coated antibiotic spacer left hip, Excisional and non excisional debridement and irrigation left hip, skin, subcutaneous tissue fascia and muscle  -TTWB for 6 weeks to LLE with posterior hip precautions per ortho, will need SNF as failed home treatments  -pain control with multimodals  -hg stable at 10 post op now, bowel regimen  -CX pending, likely will need 6 weeks antibiotics restarted per ID, on dapto now  -drain in place post op

## 2024-02-16 NOTE — PT/OT/SLP EVAL
Occupational Therapy   Co-Evaluation    Name: Froilan Ray  MRN: [id number]  Admitting Diagnosis: Status post Girdlestone procedure  Recent Surgery: Procedure(s) (LRB):  Revision hip antibiotic spacer head exchange, left, lateral, peg board, depuy osteomed in room, vanc, ancef, txa, (Left)  IRRIGATION AND DEBRIDEMENT, LOWER EXTREMITY (Left)  FLAP GRAFT, LOCAL (Left) 1 Day Post-Op    Recommendations:     Discharge Recommendations: Moderate Intensity Therapy  Discharge Equipment Recommendations:  none  Barriers to discharge:       Assessment:     Froilan Ray is a 78 y.o. female with a medical diagnosis of Status post Girdlestone procedure.  She presents with poor WB adherence during fxl mob possibly 2/2 pain she tolerate 2 sit to stand at EOB. Performance deficits affecting function: weakness, impaired endurance, impaired self care skills, impaired functional mobility, gait instability, impaired balance, decreased safety awareness, pain, orthopedic precautions.      Rehab Prognosis: Fair; patient would benefit from acute skilled OT services to address these deficits and reach maximum level of function.       Plan:     Patient to be seen  (hip path 2/16-2/18 then 4x/wk) to address the above listed problems via self-care/home management, neuromuscular re-education, therapeutic activities, therapeutic exercises  Plan of Care Expires: 03/17/24  Plan of Care Reviewed with: patient    Subjective     Chief Complaint: Pain at LLE   Patient/Family Comments/goals: Pt. Report she is just ready to get back into bed now    Occupational Profile:  Living Environment: Pt lives with her spouse and son in a H with ramp to enter   Previous level of function: Assistance to propel W/c and light assistance with ADL able to self feed, Ind  Equipment Used at Home: grab bar, shower chair, walker, rolling, wheelchair  Assistance upon Discharge: Son and spouse    Pain/Comfort:  Pain Rating 1:  (did not rate increase yells of pain  with mob)  Location - Side 1: Left  Location - Orientation 1: generalized  Location 1: leg  Pain Addressed 1: Reposition, Cessation of Activity, Distraction    Patients cultural, spiritual, Islam conflicts given the current situation: no    Objective:     Communicated with: Nurse prior to session.  Patient found HOB elevated with SCD, EEG, telemetry, barrera catheter, peripheral IV upon OT entry to room.    General Precautions: Standard, fall  Orthopedic Precautions: LLE posterior precautions, LLE toe touch weight bearing  Braces: N/A  Respiratory Status: Room air    Occupational Performance:    Bed Mobility:    Patient completed Supine to Sit with maximal assistance  Patient completed Sit to Supine with maximal assistance    Functional Mobility/Transfers:  Patient completed Sit <> Stand Transfer with maximal assistance and of 2 persons  with  rolling walker   Patient completed Sit <> Stand Transfer with total assistance  with  hand-held assist      Activities of Daily Living:  Upper Body Dressing: minimum assistance don gown posteriorly   Lower Body Dressing: total assistance at bed level to don sock    Cognitive/Visual Perceptual:  Cognitive/Psychosocial Skills:     -       Oriented to: Person, Place, and Situation   -       Follows Commands/attention:Follows multistep  commands  -       Communication: clear/fluent  -       Memory: No Deficits noted  -       Safety awareness/insight to disability: impaired   -       Mood/Affect/Coping skills/emotional control: Appropriate to situation  Visual/Perceptual:  -  R eye blind    Physical Exam:  Balance:   Upper Extremity Range of Motion:     -       Right Upper Extremity: WFL  -       Left Upper Extremity: WFL  Upper Extremity Strength:    -       Right Upper Extremity: WFL 3+/5  -       Left Upper Extremity: WFL 4/5   Strength:    -       Right Upper Extremity: WFL 4/5  -       Left Upper Extremity: WFL 4/5    AMPAC 6 Click ADL:  AMPAC Total Score:  14    Treatment & Education:  Pt.educated and reviewed TTWB precautions and posterior hip precautions   Pt educated on role of occupational therapy, POC, and safety during ADLs and functional mobility. Pt and OT discussed importance of safe, continued mobility to optimize daily living skills. Pt verbalized understanding. Pt given instruction to call for medical staff/nurse for assistance.   PT present for coeval due to pt's multiple medical comorbidities and functional/cognition deficits requiring two skilled therapists to appropriately progress pt's musculoskeletal strength, neuromuscular control, and endurance while taking into consideration medical acuity and pt safety.       Patient left HOB elevated with all lines intact and call button in reach    GOALS:   Multidisciplinary Problems       Occupational Therapy Goals          Problem: Occupational Therapy    Goal Priority Disciplines Outcome Interventions   Occupational Therapy Goal     OT, PT/OT Ongoing, Progressing    Description: Goals to be met by: 3/17/24     Patient will increase functional independence with ADLs by performing:    LE Dressing with Moderate Assistance.  Grooming while standing at sink with Stand-by Assistance.  Toileting from bedside commode with Moderate Assistance for hygiene and clothing management.   Toilet transfer to bedside commode with Moderate Assistance.                         History:     Past Medical History:   Diagnosis Date    Anemia     Arthritis     Bleeding ulcer 07/2016    Chronic pain     DDD (degenerative disc disease), cervical     DDD (degenerative disc disease), lumbar     Depression     Disc degeneration, lumbosacral     Diverticulitis     Encounter for blood transfusion     Hypertension     IBS (irritable bowel syndrome)     Neuropathy of both feet     Seizures     none  since 2017    Umbilical hernia 2020       Past Surgical History:   Procedure Laterality Date    ARTHROPLASTY, HIP, GIRDLESTONE, POSTERIOR APPROACH  Left 1/19/2024    Procedure: ARTHROPLASTY, HIP, GIRDLESTONE, POSTERIOR APPROACH;  Surgeon: Bulmaro Tellez MD;  Location: [facility] OR 2ND FLR;  Service: Orthopedics;  Laterality: Left;    ARTHROPLASTY, HIP, GIRDLESTONE, POSTERIOR APPROACH Left 1/25/2024    Procedure: Irrigation and debridement left hip,;  Surgeon: Juanito Painting MD;  Location: [facility] OR 2ND FLR;  Service: Orthopedics;  Laterality: Left;    BACK SURGERY      BREAST BIOPSY      BREAST SURGERY Right     lumpectomy    CAUDAL EPIDURAL STEROID INJECTION N/A 01/28/2022    Procedure: Injection-steroid-epidural-caudal;  Surgeon: Luzmaria Marcelino MD;  Location: [facility] OR;  Service: Pain Management;  Laterality: N/A;    COLONOSCOPY N/A 01/14/2022    Procedure: COLONOSCOPY;  Surgeon: Abby Landers MD;  Location: [facility];  Service: Endoscopy;  Laterality: N/A;    ENDOSCOPIC ULTRASOUND OF UPPER GASTROINTESTINAL TRACT N/A 07/21/2020    Procedure: ULTRASOUND, UPPER GI TRACT, ENDOSCOPIC;  Surgeon: Marcio Nguyễn III, MD;  Location: [facility];  Service: Endoscopy;  Laterality: N/A;    ESOPHAGOGASTRODUODENOSCOPY N/A 01/14/2022    Procedure: EGD (ESOPHAGOGASTRODUODENOSCOPY);  Surgeon: Abby Landers MD;  Location: [facility];  Service: Endoscopy;  Laterality: N/A;    ESOPHAGOGASTRODUODENOSCOPY N/A 06/10/2022    Procedure: EGD (ESOPHAGOGASTRODUODENOSCOPY);  Surgeon: Abby Landers MD;  Location: [facility];  Service: Endoscopy;  Laterality: N/A;    EYE SURGERY      cataract    HYSTERECTOMY      INTRAMEDULLARY RODDING OF TROCHANTER OF FEMUR Left 12/11/2018    Procedure: INSERTION, INTRAMEDULLARY SANTOS, FEMUR, TROCHANTER;  Surgeon: Eulalio De La Cruz MD;  Location: [facility] OR;  Service: Orthopedics;  Laterality: Left;    IRRIGATION AND DEBRIDEMENT OF LOWER EXTREMITY Left 1/19/2024    Procedure: IRRIGATION AND DEBRIDEMENT, LOWER EXTREMITY;  Surgeon: Bulmaro Tellez MD;  Location: [facility] OR 2ND FLR;  Service: Orthopedics;  Laterality: Left;    REPAIR OF  EPIGASTRIC HERNIA N/A 12/7/2023    Procedure: REPAIR, HERNIA, EPIGASTRIC;  Surgeon: Vipul Escobar MD;  Location: [facility];  Service: General;  Laterality: N/A;    SPINAL CORD STIMULATOR IMPLANT  09/18/2013    and removal    SPINE SURGERY  2006    lumbar L2-S1 decompression.    SPINE SURGERY      cervical decompression    TONSILLECTOMY         Time Tracking:     OT Date of Treatment: 02/16/24  OT Start Time: 1046  OT Stop Time: 1110  OT Total Time (min): 24 min    Billable Minutes:Evaluation 14  Neuromuscular Re-education 10    2/16/2024

## 2024-02-16 NOTE — PLAN OF CARE
Problem: Physical Therapy  Goal: Physical Therapy Goal  Description: PT goals until 2/30/24    1. Pt supine to sit with minimal assist-not met  2. Pt sit to supine with moderate assist-not met  3. Pt sit to stand with RW with TTWB L LE with moderate assist-not met  4. Pt to perform gait 5ft with RW with TTWB L LE with moderate assist.-not met  5. Pt to transfer bed to/from bedside chair with TTWB L LE with RW with moderate assist.-not met  6. Pt to propel w/c 50ft with B UE on level surface with CGA.-not met  7. Pt to perform B LE exs in sitting or supine x 10 reps to strengthen B LE to improve functional mobility.-not met   Outcome: Ongoing, Progressing   Pt's goals set and pt will benefit from skilled PT services to work towards improved functional mobility including: bed mobility, transfers, w/c mobility, and gait. Sammi Reyes PT  2/16/2024

## 2024-02-16 NOTE — PROGRESS NOTES
[facility] - [facility]  Orthopedics  Progress Note    Patient Name: Froilan Ray  MRN: [id number]  Admission Date: 2/14/2024  Hospital Length of Stay: 2 days  Attending Provider: Mayte Rogers MD  Primary Care Provider: Alphonse Boothe III, MD  Follow-up For: Procedure(s) (LRB):  Revision hip antibiotic spacer head exchange, left, lateral, peg board, depuy osteomed in room, vanc, ancef, txa, (Left)  IRRIGATION AND DEBRIDEMENT, LOWER EXTREMITY (Left)  FLAP GRAFT, LOCAL (Left)    Post-Operative Day: 1 Day Post-Op  Subjective:     Principal Problem:Status post Girdlestone procedure    Principal Orthopedic Problem: s/p left hip revision antibiotic spacer placement on 2/15    Interval History: NAEON. VSS. L hip pain controlled. Drain output 160cc ss output since OR. Hgb 10.1.    Review of patient's allergies indicates:  No Known Allergies    Current Facility-Administered Medications   Medication    acetaminophen tablet 1,000 mg    apixaban tablet 2.5 mg    bisacodyL suppository 10 mg    calcium carbonate 200 mg calcium (500 mg) chewable tablet 1,000 mg    ceFAZolin 2 g in dextrose 5 % in water (D5W) 50 mL IVPB (MB+)    cyclobenzaprine tablet 10 mg    DAPTOmycin (CUBICIN) 675 mg in sodium chloride 0.9% SolP 50 mL IVPB    docusate sodium capsule 100 mg    ergocalciferol capsule 50,000 Units    EScitalopram oxalate tablet 20 mg    melatonin tablet 6 mg    oxyCODONE immediate release tablet 5 mg    And    oxyCODONE immediate release tablet Tab 10 mg    And    morphine injection 2 mg    nystatin powder    ondansetron injection 4 mg    polyethylene glycol packet 17 g    pregabalin capsule 200 mg    sodium chloride 0.9% flush 10 mL    sodium chloride 0.9% flush 10 mL    vitamin D 1000 units tablet 1,000 Units     Objective:     Vital Signs (Most Recent):  Temp: 98.3 °F (36.8 °C) (02/16/24 0445)  Pulse: 65 (02/16/24 0445)  Resp: 18 (02/16/24 0445)  BP: (!) 108/58 (02/16/24 0445)  SpO2: 97 % (02/16/24 0445) Vital Signs  "(24h Range):  Temp:  [97.9 °F (36.6 °C)-98.8 °F (37.1 °C)] 98.3 °F (36.8 °C)  Pulse:  [61-83] 65  Resp:  [11-22] 18  SpO2:  [94 %-100 %] 97 %  BP: ()/(53-82) 108/58     Weight: 67.9 kg (149 lb 11.1 oz)  Height: 5' 6" (167.6 cm)  Body mass index is 24.16 kg/m².      Intake/Output Summary (Last 24 hours) at 2/16/2024 0711  Last data filed at 2/16/2024 0608  Gross per 24 hour   Intake 2769.17 ml   Output 1160 ml   Net 1609.17 ml        Ortho/SPM Exam     Awake/alert/oriented x3, No acute distress, Afebrile, Vital signs stable  Good inspiratory effort with unlaboured breathing  Dressings c/d/i  NVI in operative limb     Significant Labs: All pertinent labs within the past 24 hours have been reviewed.    Significant Imaging: I have reviewed all pertinent imaging results/findings.  Assessment/Plan:     * Antibiotic Spacer failure status post Girdlestone procedure  Froilan Ray is a 78 y.o. female with PMH of MRSA bacteremia and left-sided hip septic arthritis status post Girdlestone placement with antibiotic spacer presenting with failure of the left-sided antibiotic spacer head dissociation after fall    Now s/p left hip revision antibiotic spacer on 2/15      Pain control: MM  PT/OT: TTWB LLE  DVT PPx: eliquis 2.5 bid, SCDs at all times when not ambulating  Abx: postop Ancef and daptomycin  Cx: prior OR cx MRSA, cx 2/15 NGTD  Labs: Hgb 10.1 today   Drain: 160cc since OR  Boyd: dc today    Dispo: f/u PT recs             Paul Jimenez MD  Orthopedics  [facility]    "

## 2024-02-16 NOTE — CONSULTS
[facility] - [facility]  Wound Care    Patient Name:  Froilan Ray   MRN:  [id number]  Date: 2/16/2024  Diagnosis: Status post Girdlestone procedure    History:     Past Medical History:   Diagnosis Date    Anemia     Arthritis     Bleeding ulcer 07/2016    Chronic pain     DDD (degenerative disc disease), cervical     DDD (degenerative disc disease), lumbar     Depression     Disc degeneration, lumbosacral     Diverticulitis     Encounter for blood transfusion     Hypertension     IBS (irritable bowel syndrome)     Neuropathy of both feet     Seizures     none  since 2017    Umbilical hernia 2020       Social History     Socioeconomic History    Marital status:    Tobacco Use    Smoking status: Never    Smokeless tobacco: Never   Substance and Sexual Activity    Alcohol use: No    Drug use: No    Sexual activity: Not Currently     Social Determinants of Health     Financial Resource Strain: Low Risk  (2/15/2024)    Overall Financial Resource Strain (CARDIA)     Difficulty of Paying Living Expenses: Not hard at all   Food Insecurity: No Food Insecurity (2/15/2024)    Hunger Vital Sign     Worried About Running Out of Food in the Last Year: Never true     Ran Out of Food in the Last Year: Never true   Transportation Needs: No Transportation Needs (2/15/2024)    PRAPARE - Transportation     Lack of Transportation (Medical): No     Lack of Transportation (Non-Medical): No   Physical Activity: Inactive (2/15/2024)    Exercise Vital Sign     Days of Exercise per Week: 0 days     Minutes of Exercise per Session: 0 min   Stress: No Stress Concern Present (2/15/2024)    [facility] [facility] [employer] - Occupational Stress Questionnaire     Feeling of Stress : Not at all   Social Connections: Moderately Isolated (2/15/2024)    Social Connection and Isolation Panel [NHANES]     Frequency of Communication with Friends and Family: More than three times a week     Frequency of Social Gatherings with Friends and  Family: More than three times a week     Attends Denominational Services: Never     Active Member of Clubs or Organizations: No     Attends Club or Organization Meetings: Never     Marital Status:    Housing Stability: Low Risk  (2/15/2024)    Housing Stability Vital Sign     Unable to Pay for Housing in the Last Year: No     Number of Places Lived in the Last Year: 1     Unstable Housing in the Last Year: No       Precautions:     Allergies as of 02/14/2024    (No Known Allergies)       WO Assessment Details/Treatment   Patient seen for wound care consultation to sacrum.   Reviewed chart for this encounter.   See Flow Sheet for findings.      Per chart review. Froilan Ray is a 78 y.o. female with history of L hip surgery, HTN, HLD, CHF, chronic pain on opioids, peripheral neuropathy, and recent admission for MRSA septic arthritis requiring replacement of L hip hardware who was transferred from [facility] for L prosthetic hip fracture. Wound care noted intact, red ulceration. Wound care applied a Mepilex for prevention and protection. Pt denied any needs at this time.       RECOMMENDATIONS:  - Waffle mattress  - RAFI Notified.   - Bedside nurse to perform wound care to buttocks:  Cleanse wound with soap and water  Pat dry.  Apply a foam border (Mepilex) to wound bed   Apply this PRN d/t soilage  Recommendations made to primary team for above plan via secured chat. Wound Care to follow up. Orders placed.     Discussed POC with patient and primary RN.   See EMR for orders & patient education.  Discussed POC with primary team.  RAFI Notified.    Nursing to continue care.  Nursing to maintain pressure injury prevention interventions.  Contact wound care for any further questions.         02/16/24 1250   WOCN Assessment   WOCN Total Time (mins) 30   Visit Date 02/16/24   Visit Time 1250   Consult Type New   WOCN Speciality Wound   Intervention assessed;applied;chart review;orders   Teaching on-going        Altered Skin  Integrity 02/16/24 1250 Right Buttocks Ulceration   Date First Assessed/Time First Assessed: 02/16/24 1250   Side: Right  Location: Buttocks  Primary Wound Type: Ulceration   Wound Image    Dressing Appearance Dry;Intact;Clean   Drainage Amount None   Appearance Intact;Red;Black;Dry   Dressing Applied;Foam   Dressing Change Due 02/23/24 02/16/2024

## 2024-02-16 NOTE — ASSESSMENT & PLAN NOTE
Recent MRSA arthritis during recent admission, planned for Daptomycin daily until 2/29 per ID recs. Will continue this now per ID recs, likely will need to restart timeline for 6 weeks per ID notes, will f/u recs further. Picc in place to RUE

## 2024-02-16 NOTE — PLAN OF CARE
Pt arrived to floor from PACU. VSS, no complaints of pain throughout shift. Blood transfusion ongoing at this time, refer to flowsheet for stop time. No signs/symptoms of adverse reaction noted. Lung sounds clear, diminished; NC at 1L. Pt pale in color. Dressing to L hip CDI. BEULAH drain to bulb suction. Neurovasc intact. Scheduled meds given, see MAR. RUE PICC intact and double lumens flush well. SCD in place. Boyd cath draining to gravity. Safety maintained, call light within reach. Report prepared for oncoming nurse, plan of care continues.     Problem: Adult Inpatient Plan of Care  Goal: Plan of Care Review  Outcome: Ongoing, Progressing  Goal: Patient-Specific Goal (Individualized)  Outcome: Ongoing, Progressing  Goal: Absence of Hospital-Acquired Illness or Injury  Outcome: Ongoing, Progressing     Problem: Infection  Goal: Absence of Infection Signs and Symptoms  Outcome: Ongoing, Progressing     Problem: Adjustment to Injury (Hip Fracture Medical Management)  Goal: Optimal Coping with Change in Health Status  Outcome: Ongoing, Progressing     Problem: Bleeding (Hip Fracture Medical Management)  Goal: Absence of Bleeding  Outcome: Ongoing, Progressing

## 2024-02-16 NOTE — PLAN OF CARE
Pt. Evaluated and goals established     Problem: Occupational Therapy  Goal: Occupational Therapy Goal  Description: Goals to be met by: 3/17/24     Patient will increase functional independence with ADLs by performing:    LE Dressing with Moderate Assistance.  Grooming while standing at sink with Stand-by Assistance.  Toileting from bedside commode with Moderate Assistance for hygiene and clothing management.   Toilet transfer to bedside commode with Moderate Assistance.    Outcome: Ongoing, Progressing

## 2024-02-16 NOTE — ANESTHESIA POSTPROCEDURE EVALUATION
Anesthesia Post Evaluation    Patient: Froilan Ray    Procedure(s) Performed: Procedure(s) (LRB):  Revision hip antibiotic spacer head exchange, left, lateral, peg board, depuy osteomed in room, vanc, ancef, txa, (Left)  IRRIGATION AND DEBRIDEMENT, LOWER EXTREMITY (Left)  FLAP GRAFT, LOCAL (Left)    Final Anesthesia Type: general      Patient location during evaluation: PACU  Patient participation: Yes- Able to Participate  Level of consciousness: awake and alert  Post-procedure vital signs: reviewed and stable  Pain management: adequate  Airway patency: patent    PONV status at discharge: No PONV  Anesthetic complications: no      Cardiovascular status: blood pressure returned to baseline  Respiratory status: unassisted, spontaneous ventilation and room air  Hydration status: euvolemic                Vitals Value Taken Time   /57 02/15/24 2118   Temp 36.8 02/15/24 2118   Pulse 79 02/15/24 2118   Resp 20 02/15/24 2118   SpO2 93 % 02/15/24 2118   Vitals shown include unvalidated device data.      No case tracking events are documented in the log.      Pain/Duarte Score: Pain Rating Prior to Med Admin: 7 (2/15/2024  9:00 PM)  Pain Rating Post Med Admin: 4 (2/15/2024  4:46 PM)

## 2024-02-16 NOTE — ASSESSMENT & PLAN NOTE
Patient has hypokalemia which is Acute and currently controlled. Most recent potassium levels reviewed-   Lab Results   Component Value Date    K 4.4 02/16/2024   . Will continue potassium replacement per protocol and recheck repeat levels after replacement completed.

## 2024-02-16 NOTE — PLAN OF CARE
Problem: Adult Inpatient Plan of Care  Goal: Plan of Care Review  Outcome: Ongoing, Progressing  Goal: Patient-Specific Goal (Individualized)  Outcome: Ongoing, Progressing  Goal: Absence of Hospital-Acquired Illness or Injury  Outcome: Ongoing, Progressing  Goal: Optimal Comfort and Wellbeing  Outcome: Ongoing, Progressing  Goal: Readiness for Transition of Care  Outcome: Ongoing, Progressing     Problem: Infection  Goal: Absence of Infection Signs and Symptoms  Outcome: Ongoing, Progressing     Problem: Adjustment to Injury (Hip Fracture Medical Management)  Goal: Optimal Coping with Change in Health Status  Outcome: Ongoing, Progressing     Problem: Bleeding (Hip Fracture Medical Management)  Goal: Absence of Bleeding  Outcome: Ongoing, Progressing     Problem: Bowel Elimination Impaired (Hip Fracture Medical Management)  Goal: Effective Bowel Elimination  Outcome: Ongoing, Progressing     Problem: Cognitive Decline Risk (Hip Fracture Medical Management)  Goal: Baseline Cognitive Function Maintained  Outcome: Ongoing, Progressing     Problem: Embolism (Hip Fracture Medical Management)  Goal: Absence of Embolism  Outcome: Ongoing, Progressing     Problem: Fracture Stabilization and Management (Hip Fracture Medical Management)  Goal: Fracture Stability  Outcome: Ongoing, Progressing     Problem: Functional Ability Impaired (Hip Fracture Medical Management)  Goal: Optimal Functional Performance  Outcome: Ongoing, Progressing     Problem: Pain (Hip Fracture Medical Management)  Goal: Acceptable Pain Level  Outcome: Ongoing, Progressing     Problem: Urinary Elimination Impaired (Hip Fracture Medical Management)  Goal: Effective Urinary Elimination  Outcome: Ongoing, Progressing     Problem: Bleeding (Surgery Nonspecified)  Goal: Absence of Bleeding  Outcome: Ongoing, Progressing     Problem: Bowel Motility Impaired (Surgery Nonspecified)  Goal: Effective Bowel Elimination  Outcome: Ongoing, Progressing     Problem:  Fluid and Electrolyte Imbalance (Surgery Nonspecified)  Goal: Fluid and Electrolyte Balance  Outcome: Ongoing, Progressing     Problem: Glycemic Control Impaired (Surgery Nonspecified)  Goal: Blood Glucose Level Within Targeted Range  Outcome: Ongoing, Progressing     Problem: Infection (Surgery Nonspecified)  Goal: Absence of Infection Signs and Symptoms  Outcome: Ongoing, Progressing     Problem: Ongoing Anesthesia Effects (Surgery Nonspecified)  Goal: Anesthesia/Sedation Recovery  Outcome: Ongoing, Progressing     Problem: Pain (Surgery Nonspecified)  Goal: Acceptable Pain Control  Outcome: Ongoing, Progressing     Problem: Postoperative Nausea and Vomiting (Surgery Nonspecified)  Goal: Nausea and Vomiting Relief  Outcome: Ongoing, Progressing     Problem: Postoperative Urinary Retention (Surgery Nonspecified)  Goal: Effective Urinary Elimination  Outcome: Ongoing, Progressing     Problem: Respiratory Compromise (Surgery Nonspecified)  Goal: Effective Oxygenation and Ventilation  Outcome: Ongoing, Progressing     Problem: Device-Related Complication Risk (Anesthesia/Analgesia, Neuraxial)  Goal: Safe Infusion Delivery Completion  Outcome: Ongoing, Progressing     Problem: Infection (Anesthesia/Analgesia, Neuraxial)  Goal: Absence of Infection Signs and Symptoms  Outcome: Ongoing, Progressing     Problem: Nausea and Vomiting (Anesthesia/Analgesia, Neuraxial)  Goal: Nausea and Vomiting Relief  Outcome: Ongoing, Progressing     Problem: Pain (Anesthesia/Analgesia, Neuraxial)  Goal: Effective Pain Control  Outcome: Ongoing, Progressing     Problem: Respiratory Compromise (Anesthesia/Analgesia, Neuraxial)  Goal: Effective Oxygenation and Ventilation  Outcome: Ongoing, Progressing     Problem: Sensorimotor Impairment (Anesthesia/Analgesia, Neuraxial)  Goal: Baseline Motor Function  Outcome: Ongoing, Progressing     Problem: Urinary Retention (Anesthesia/Analgesia, Neuraxial)  Goal: Effective Urinary  Elimination  Outcome: Ongoing, Progressing     Problem: Fall Injury Risk  Goal: Absence of Fall and Fall-Related Injury  Outcome: Ongoing, Progressing

## 2024-02-16 NOTE — PLAN OF CARE
02/16/24 1400   Post-Acute Status   Post-Acute Authorization Home Health;IV Infusion   Home Health Status Referrals Sent   IV Infusion Status Referral(s) sent   Discharge Plan   Discharge Plan A Home with family;Home Health     Referrals sent to [facility] as they were providing patient's care prior to admission. Family did allow CM to place SNF referrals but Ultimately plan to take patient home once she is medically stable. BAILEY 2/19/24.    Kaye Lan RNCM  Case Management  [facility]  [phone]

## 2024-02-16 NOTE — PT/OT/SLP EVAL
Physical Therapy Evaluation/co eval with OT    Patient Name:  Froilan Ray   MRN:  [id number]    Recommendations:     Discharge Recommendations: Moderate Intensity Therapy   Discharge Equipment Recommendations: none   Barriers to discharge: Decreased caregiver support requires assist for mobility    Assessment:     Froilan Ray is a 78 y.o. female admitted with a medical diagnosis of Status post Girdlestone procedure.  She presents with the following impairments/functional limitations: weakness, gait instability, impaired balance, impaired self care skills, impaired functional mobility, pain, decreased safety awareness, decreased lower extremity function .Pt is unsafe with functional mobility at this time due to pt requires max  assist for bed mobility, total assist for transfers, and minimal to max assist for sitting balance due to posterior lean, weakness, and pain. Pt is motivated to progress with functional mobility.     Rehab Prognosis: Fair; patient would benefit from acute skilled PT services to address these deficits and reach maximum level of function.    Recent Surgery: Procedure(s) (LRB):  Revision hip antibiotic spacer head exchange, left, lateral, peg board, depuy osteomed in room, vanc, ancef, txa, (Left)  IRRIGATION AND DEBRIDEMENT, LOWER EXTREMITY (Left)  FLAP GRAFT, LOCAL (Left) 1 Day Post-Op    Plan:     During this hospitalization, patient to be seen daily to address the identified rehab impairments via gait training, therapeutic activities, therapeutic exercises, neuromuscular re-education and progress toward the following goals:    Plan of Care Expires:  03/17/24    Subjective       Pain/Comfort:  Pain Rating 1:  (c/o pain but pt did not grade)  Location - Side 1: Left  Location - Orientation 1: generalized  Location 1: leg  Pain Addressed 1: Reposition, Cessation of Activity  Pain Rating Post-Intervention 1:  (pt appeared more comfortable upon return to supine-did not  grade)    Patients cultural, spiritual, Judaism conflicts given the current situation: no    Living Environment:  Pt lives with her  and son in a 1 story home with ramp to enter  Prior to admission, patients level of function was required assist for ADLs and transfers to the w/c. Pt states she hadn't walked in the last few weeks due to pain; pt states her family would push her around in the w/c.  Equipment used at home: grab bar, shower chair, walker, rolling, wheelchair. Upon discharge, patient will have assistance from  and son.    Objective:     Communicated with nurse prior to session.  Patient found HOB elevated with SCD, telemetry, barrera catheter, peripheral IV, oxygen  upon PT entry to room.    General Precautions: Standard, fall, blind in R eye  Orthopedic Precautions:LLE posterior precautions, LLE toe touch weight bearing   Braces: N/A  Respiratory Status: Nasal cannula, flow 1 L/min    Exams:  Cognitive Exam:  Patient is oriented to Person and Place  Sensation:    -       Intact  light/touch R LE and above knee L LE; impaired to lt touch below knee L LE  RLE ROM: WFL except knee ext limited in sitting due to tight hamstrings; ankle DF to neutral  RLE Strength: Deficits: hip flex 3-/5; knee flex/ext 4-/5; ankle DF 3-/5  LLE ROM: Deficits: limited hip and knee ROM due to pain; ankle DF to neutral  LLE Strength: Deficits: hip NT due to pain; knee flex 3-/5; knee ext 2+/5; ankle DF 2+/5    Functional Mobility:  Bed Mobility:     Supine to Sit: maximal assistance  Sit to Supine: maximal assistance  Transfers:     Sit to Stand:  maximal assistance and of 2 persons ~ 30 sec with rolling walker -pt required manual and verbal cues throughout standing to maintain TTWB L LE (pt moved her foot off the therapist's foot to weight bear on the L foot so pt returned to sitting) then total assist of 1 with HHA for ~ 20 sec with manual cues to maintain TTWB L LE to allow linens to be repositioned.      AM-PAC 6 CLICK MOBILITY  Total Score:9     Treatment & Education:  Pt sat on the EOB ~ 10 min with max assist initially due to posterior instability then pt assisted with scooting to the EOB with max assist. Pt then able to sit with CGA once her feet were on the floor  Co-treat with OT due to medical complexity of pt and need for skilled hands for safe intervention.   Patient left HOB elevated with all lines intact, call button in reach, and nurse notified.    GOALS:   Multidisciplinary Problems       Physical Therapy Goals          Problem: Physical Therapy    Goal Priority Disciplines Outcome Goal Variances Interventions   Physical Therapy Goal     PT, PT/OT Ongoing, Progressing     Description: PT goals until 2/30/24    1. Pt supine to sit with minimal assist-not met  2. Pt sit to supine with moderate assist-not met  3. Pt sit to stand with RW with TTWB L LE with moderate assist-not met  4. Pt to perform gait 5ft with RW with TTWB L LE with moderate assist.-not met  5. Pt to transfer bed to/from bedside chair with TTWB L LE with RW with moderate assist.-not met  6. Pt to propel w/c 50ft with B UE on level surface with CGA.-not met  7. Pt to perform B LE exs in sitting or supine x 10 reps to strengthen B LE to improve functional mobility.-not met                        History:     Past Medical History:   Diagnosis Date    Anemia     Arthritis     Bleeding ulcer 07/2016    Chronic pain     DDD (degenerative disc disease), cervical     DDD (degenerative disc disease), lumbar     Depression     Disc degeneration, lumbosacral     Diverticulitis     Encounter for blood transfusion     Hypertension     IBS (irritable bowel syndrome)     Neuropathy of both feet     Seizures     none  since 2017    Umbilical hernia 2020       Past Surgical History:   Procedure Laterality Date    ARTHROPLASTY, HIP, GIRDLESTONE, POSTERIOR APPROACH Left 1/19/2024    Procedure: ARTHROPLASTY, HIP, GIRDLESTONE, POSTERIOR APPROACH;   Surgeon: Bulmaro Tellez MD;  Location: [facility] OR 2ND FLR;  Service: Orthopedics;  Laterality: Left;    ARTHROPLASTY, HIP, GIRDLESTONE, POSTERIOR APPROACH Left 1/25/2024    Procedure: Irrigation and debridement left hip,;  Surgeon: Juanito Painting MD;  Location: [facility] OR 2ND FLR;  Service: Orthopedics;  Laterality: Left;    BACK SURGERY      BREAST BIOPSY      BREAST SURGERY Right     lumpectomy    CAUDAL EPIDURAL STEROID INJECTION N/A 01/28/2022    Procedure: Injection-steroid-epidural-caudal;  Surgeon: Luzmaria Marcelino MD;  Location: [facility] OR;  Service: Pain Management;  Laterality: N/A;    COLONOSCOPY N/A 01/14/2022    Procedure: COLONOSCOPY;  Surgeon: Abby Landers MD;  Location: [facility];  Service: Endoscopy;  Laterality: N/A;    ENDOSCOPIC ULTRASOUND OF UPPER GASTROINTESTINAL TRACT N/A 07/21/2020    Procedure: ULTRASOUND, UPPER GI TRACT, ENDOSCOPIC;  Surgeon: Marcio Nguyễn III, MD;  Location: [facility];  Service: Endoscopy;  Laterality: N/A;    ESOPHAGOGASTRODUODENOSCOPY N/A 01/14/2022    Procedure: EGD (ESOPHAGOGASTRODUODENOSCOPY);  Surgeon: Abby Landers MD;  Location: [facility];  Service: Endoscopy;  Laterality: N/A;    ESOPHAGOGASTRODUODENOSCOPY N/A 06/10/2022    Procedure: EGD (ESOPHAGOGASTRODUODENOSCOPY);  Surgeon: Abby Landers MD;  Location: [facility] ENDO;  Service: Endoscopy;  Laterality: N/A;    EYE SURGERY      cataract    HYSTERECTOMY      INTRAMEDULLARY RODDING OF TROCHANTER OF FEMUR Left 12/11/2018    Procedure: INSERTION, INTRAMEDULLARY SANTOS, FEMUR, TROCHANTER;  Surgeon: Eulalio De La Cruz MD;  Location: [facility] OR;  Service: Orthopedics;  Laterality: Left;    IRRIGATION AND DEBRIDEMENT OF LOWER EXTREMITY Left 1/19/2024    Procedure: IRRIGATION AND DEBRIDEMENT, LOWER EXTREMITY;  Surgeon: Bulmaro Tellez MD;  Location: [facility] OR 2ND FLR;  Service: Orthopedics;  Laterality: Left;    REPAIR OF EPIGASTRIC HERNIA N/A 12/7/2023    Procedure: REPAIR, HERNIA, EPIGASTRIC;  Surgeon:  Vipul Escobar MD;  Location: [facility];  Service: General;  Laterality: N/A;    SPINAL CORD STIMULATOR IMPLANT  09/18/2013    and removal    SPINE SURGERY  2006    lumbar L2-S1 decompression.    SPINE SURGERY      cervical decompression    TONSILLECTOMY         Time Tracking:     PT Received On: 02/16/24  PT Start Time: 1046     PT Stop Time: 1110  PT Total Time (min): 24 min     Billable Minutes: Evaluation 14 and Therapeutic Activity 10      02/16/2024

## 2024-02-16 NOTE — NURSING TRANSFER
Nursing Transfer Note      2/15/2024   10:39 PM    Nurse giving handoff:odalys rowley  Nurse receiving handoff:odalys bush    Reason patient is being transferred: post procedure    Transfer To: 506    Transfer via bed    Transfer with 1l of O2    Transported by RN    Transfer Vital Signs: see flowsheets  Order for Tele Monitor? No    Additional Lines: Oxygen and barrera    4eyes on Skin: yes    Medicines sent: rbc    Any special needs or follow-up needed: routine    Patient belongings transferred with patient: No    Chart send with patient: Yes    Notified: spouse    Patient reassessed at: 2/15/24 [id number]

## 2024-02-16 NOTE — ANESTHESIA PROCEDURE NOTES
Intubation    Date/Time: 2/15/2024 5:49 PM    Performed by: Zee Starkey  Authorized by: Kurtis Licona Jr., MD    Intubation:     Induction:  Intravenous    Intubated:  Postinduction    Mask Ventilation:  Easy mask    Attempts:  1    Attempted By:  Student    Method of Intubation:  Video laryngoscopy    Blade:  Goff 3    Laryngeal View Grade: Grade I - full view of cords      Difficult Airway Encountered?: No      Complications:  None    Airway Device:  Oral endotracheal tube    Airway Device Size:  7.0    Style/Cuff Inflation:  Cuffed (inflated to minimal occlusive pressure)    Tube secured:  22    Secured at:  The lips    Placement Verified By:  Capnometry and Revisualization with laryngoscopy    Complicating Factors:  None    Findings Post-Intubation:  BS equal bilateral and atraumatic/condition of teeth unchanged

## 2024-02-16 NOTE — ASSESSMENT & PLAN NOTE
78 year old female with hx of cephalomedullary nail fixation left proximal femur who was [place] [facility] [place]-  s/p I&D and antibiotic spacer placement on 1/19/24 and repeat I&D on 1/25, aspiration cultures with MRSA, hospital course notable for MRSA bacteremia which she cleared quickly, TTE neg, on IV daptomycin for six weeks with OPAT. Patient refused SNF placement at the time of discharge. I saw her in clinic and there was concern regarding OPAT adherence.     She is now [place] [facility] [place] a [place] with direct impact over her left hip resulting in the displacement of her prosthesis, afebrile, hemodynamically stable, blood cultures with no growth, and without leukocytes. S/p operative intervention with ortho- I&D and revision placement of articulating cement coated antibiotic spacer left hip, operative cultures incubating.     Recommendation    - Continue IV daptomycin, no need for doxycycline (dual therapy 2/2 to prior non adherence) while in patient.     - Follow blood cultures and operative cultures. Most likely we will restart her timeline for PJI therapy with IV course of six weeks post operative intervention.     -not an OPAT candidate, discussed plan of care with the patient and primary team

## 2024-02-16 NOTE — ASSESSMENT & PLAN NOTE
Chronically severe, and stable around baseline. With baseline hg 7-9, tranfusions on 2/15 for acute blood loss anemia.

## 2024-02-16 NOTE — OP NOTE
OP NOTE    DOS:  02/15/2024    Preop Dx: Infected left hip status post multiple debridement irrigation procedure is antibiotic spacer placement with dislocated antibiotic spacer    Postop Dx: Infected left hip status post multiple debridement irrigation procedure is antibiotic spacer placement with dislocated antibiotic spacer    Procedure: Revision placement of articulating cement coated antibiotic spacer left hip      Excisional and non excisional debridement and irrigation left hip, skin, subcutaneous tissue fascia and muscle, 200 sq cm - 44248, 11046x9     Surgeon: Bulmaro Tellez M.D.    Asst:  Iggy Arambula M.D    Anesthesia: GETA    EBL:  100 cc    IVF:  1500 cc crystalloid    Implants: Osteo remedies long articulating proximal femoral cemented hip stem  Implant Name Type Inv. Item Serial No.  Lot No. LRB No. Used Action   REMEDY SPECTRUM GV MODULAR FEMORAL HEAD      Left 1 Implanted   MODULAR FEMORAL LONG STEM      Left 1 Implanted   CEMENT BONE RDPQ 40G PDR 20ML - [id number]  CEMENT BONE RDPQ 40G PDR 20ML  [employer]  Left 1 Implanted   CEMENT BONE RDPQ 40G PDR 20ML - [id number]  CEMENT BONE RDPQ 40G PDR 20ML  [employer]  Left 1 Implanted         Specimens: Cultures    Findings: Significant unhealthy tissue debrided sharply and electrocautery x2 100 sq cm    Dispo:  To PACU extubated/stable       Indications for Procedure:      78-year-old female with remote history of cephalomedullary nail fixation of the left hip.  She developed arthritis in that hip and underwent injection.  She presented later to an outside hospital with septic arthritis in that left hip.  She was transferred to our hospital.  I took her to the operating room for hardware removal with washout and proximal femoral resection with placement of an articulating antibiotic spacer on 01/19/2024.  She underwent subsequent irrigation debridement procedure on 01/25/2024.  She presents now with dissociation of the ball from the  articulating spacer and drainage from the incision.  She still has a PICC line placed for her infection.  I am taking her back for another washout procedure and will replaced the articulating antibiotic spacer.  The risks, benefits and alternatives to surgery discussed the patient prior to going the operating room.  Informed consent was obtained.    Procedure in Detail:    Patient was identified in preoperative holding area the site was marked.  Patient was wheeled to the operating room and general endotracheal anesthesia induced the patient's hospital bed.  Preoperative antibiotics were administered.  Patient was moved to the operating room table.  Patient was placed into a lateral decubitus position with the left hip up with all bony prominences well padded.  Left hip and lower extremity were prepped and draped in sterile fashion.  A time-out was undertaken to confirm patient, side, site, surgery, surgeon and administration of preoperative antibiotics.  All agreed we proceeded.    I used a 10 blade and cut around the staple line from prior surgery.  I incised all the skin subcutaneous tissues in a full-thickness paddle.  She had a great deal of unhealthy fat and some fascia underneath which were all removed with a 10 blade until I got back to healthy tissue.  I did a cursory irrigation with normal saline solution in that area and she had murky looking fluid with known MRSA infection.    At this point I incised back through the iliotibial band and gluteal fascia to the deep layer.  I dissected down through the gluteus deanna.  She is significant amount of unhealthy muscle in the area.  I removed this with electrocautery and a 10 blade.  In all from the skin through the muscle I removed about 200 sq cm of tissue.  I placed a Charnley retractor.  I identified the tip of the articulating stem.  I did a cursory irrigation with normal saline solution that area.  I used an osteotome to remove the cement from around the  calcar.  I was then able to remove the antibiotic spacer stem from the femur.  I then used a Kocher clamp to remove the head from the acetabulum.  I removed any remaining antibiotic cement from the area with a rongeur.      At this point I irrigated the canal with pulse lavage.  I then irrigated the entirety of the posterior approach with normal saline solution.  I then irrigated with Bactisure, again followed by normal saline solution.  I repeated that process with dilute Betadine, Dakin's and dilute peroxide alternating with normal saline and between.  At this point the entire area looked very clean with healthy surrounding tissue.    I placed a reaming monika into the femur and used a flexible Reamer to ream up to 15 mm.  I again irrigated with normal saline solution.  I then put the trial long stem for this cemented articulating stem down the femur and was able to fit.  On the back table I then prepared the long-stem placing the bonding fluid within the head and then putting [place]os antibiotic cement around the threads.  I placed the head on past the threshold line and held this for 12 minutes without moving it in order to gain that seal.  This cement should also augment with this.  I placed cement around threads in the base of the around the base of the stem at the calcar and placed in the femur holding significant anteversion until the cement was dry all around the calcar.    I gave another irrigation run with normal saline solution and then reduced the hip into the acetabulum.  I took her through range of motion this was quite stable.  I then again irrigated copiously with normal saline solution via pulse lavage.  I placed vancomycin and cefepime powder into the wound and closed posterior capsule short external rotators to the gluteus medius as far as I could stretch them.  I then closed the iliotibial band and gluteal fascia with antimicrobial 1. Vicryl suture.  Next layer fascia was closed 0 Vicryl suture,  subcutaneous tissue with 2-0 Vicryl suture and the skin with 3-0 strata fix and Dermabond.  I then augmented this with staples and Prevena incisional wound VAC was applied.    All instrument sponge counts were reported correct in the case.  There no complications.  The patient was returned to supine position, extubated, awakened and taken to recovery room stable condition.      Plan the patient:     Toe-touch weight-bearing left lower extremity.  We will continue her IV antibiotics.  It was my hole and it is some pulling she can get the point where tissues were healthy enough for hip arthroplasty.  However, given the look of her incision when she came back this time we need to beef up her nutrition and do everything we can to get her healing well.  For now we will leave the articulated spacer, treat with antibiotics and follow her inflammatory markers.    Bulamro Tellez MD

## 2024-02-16 NOTE — SUBJECTIVE & OBJECTIVE
Interval history- she was sleeping this morning but easily awakened and reports pain controlled this morning. Was in OR for a long time yesterday for revision of antibiotic cement spacer yestserday with dr arnold with revision as well as debridement of nonviable tissue as well with CX taken of this area. ID saw patient yesterday and rec holding doxy and continuing daptomycin for traetment at this time, will likely restart timeline for 6 weeks of antibiotics following this surgery now and will f/u further recs. Hg stbale at 10 post op after transfusions yesterday on lab review today. Bp low todayand holding home BP meds to trend post op. Plan for TTWB for 6 weeks now per ortho recs. She has GNR in urine but unclear if truly symptomatic, on cefazolin for skin ppx coverage now and will plan to start another coverage like rocephin pending CX data further tomorrow once cefazolin completes and with further Cx data from urine.       Review of patient's allergies indicates:  No Known Allergies    No current facility-administered medications on file prior to encounter.     Current Outpatient Medications on File Prior to Encounter   Medication Sig    acetaminophen (TYLENOL) 325 MG tablet Take 650 mg by mouth every 8 (eight) hours as needed for Pain.    amlodipine-benazepril 5-20 mg (LOTREL) 5-20 mg per capsule Take 1 capsule by mouth once daily.    apixaban (ELIQUIS) 5 mg Tab Take 1 tablet (5 mg total) by mouth 2 (two) times daily.    cyclobenzaprine (FLEXERIL) 10 MG tablet Take 1 tablet (10 mg total) by mouth 3 (three) times daily as needed for Muscle spasms.    docusate sodium (COLACE) 100 MG capsule Take 1 capsule (100 mg total) by mouth 2 (two) times daily.    doxycycline (VIBRAMYCIN) 100 MG Cap Take 1 capsule (100 mg total) by mouth every 12 (twelve) hours.    EScitalopram oxalate (LEXAPRO) 20 MG tablet Take 1 tablet (20 mg total) by mouth every evening.    metoprolol tartrate (LOPRESSOR) 50 MG tablet Take 1 tablet (50  mg total) by mouth 2 (two) times daily.    nystatin (MYCOSTATIN) powder Apply to affected area 3 times daily    omeprazole (PRILOSEC) 40 MG capsule Take 1 capsule (40 mg total) by mouth every morning.    oxyCODONE-acetaminophen (PERCOCET)  mg per tablet Take 1 tablet by mouth every 4 to 6 hours as needed for Pain.    pregabalin (LYRICA) 200 MG Cap Take 1 capsule (200 mg total) by mouth 3 (three) times daily. TAKE 1 CAPSULE(200 MG) BY MOUTH THREE TIMES DAILY    sodium chloride 0.9% SolP 50 mL with DAPTOmycin 500 mg SolR 500 mg Inject 500 mg into the vein once daily. End date 2/29/24    traMADoL (ULTRAM) 50 mg tablet Take 2 tablets by mouth every 12 (twelve) hours as needed for Pain.    valACYclovir (VALTREX) 1000 MG tablet Take 1 tablet (1,000 mg total) by mouth 2 (two) times daily. for 7 days    [DISCONTINUED] pantoprazole (PROTONIX) 40 MG tablet Take 1 tablet (40 mg total) by mouth 2 (two) times daily.     Family History       Problem Relation (Age of Onset)    COPD Brother    Coronary artery disease Father    Depression Father    Diabetes Mother, Father, Sister, Brother    Heart disease Father    Hypertension Mother, Father    Irritable bowel syndrome Mother          Tobacco Use    Smoking status: Never    Smokeless tobacco: Never   Substance and Sexual Activity    Alcohol use: No    Drug use: No    Sexual activity: Not Currently     Review of Systems   All other systems reviewed and are negative.    Objective:     Vital Signs (Most Recent):  Temp: 97.4 °F (36.3 °C) (02/16/24 0743)  Pulse: 66 (02/16/24 0743)  Resp: 16 (02/16/24 0743)  BP: (!) 113/59 (02/16/24 0743)  SpO2: 95 % (02/16/24 0743) Vital Signs (24h Range):  Temp:  [97.4 °F (36.3 °C)-98.8 °F (37.1 °C)] 97.4 °F (36.3 °C)  Pulse:  [61-83] 66  Resp:  [11-22] 16  SpO2:  [94 %-100 %] 95 %  BP: ()/(53-82) 113/59     Weight: 67.9 kg (149 lb 11.1 oz)  Body mass index is 24.16 kg/m².     Physical Exam  Vitals and nursing note reviewed.    Constitutional:       General: She is not in acute distress.     Appearance: She is well-developed. She is not diaphoretic.      Comments: Sleepy but wakes up easily. Mildly pale   HENT:      Head: Normocephalic and atraumatic.   Eyes:      General: No scleral icterus.     Conjunctiva/sclera: Conjunctivae normal.      Comments: Disconjugate gaze (baseline)   Neck:      Vascular: No JVD.   Cardiovascular:      Rate and Rhythm: Normal rate.   Pulmonary:      Effort: Pulmonary effort is normal. No respiratory distress.      Breath sounds: No wheezing.   Genitourinary:     Comments: Boyd in place    Musculoskeletal:      Right lower leg: Edema present.      Left lower leg: Edema present.      Comments: Bandages to LLE and drain with serosanginuous exudate. PICC in RUE    Skin:     Coloration: Skin is not jaundiced or pale.   Neurological:      Mental Status: She is alert and oriented to person, place, and time.      Motor: No abnormal muscle tone.   Psychiatric:         Mood and Affect: Mood normal.         Behavior: Behavior normal.                Significant Labs: All pertinent labs within the past 24 hours have been reviewed.  CBC:   Recent Labs   Lab 02/14/24  1137 02/15/24  0518 02/15/24  1947/24  0410   WBC 10.03 12.47  --  7.81   HGB 7.2* 7.8*  --  10.1*   HCT 23.8* 25.0* 33* 30.6*   * 481*  --  436       CMP:   Recent Labs   Lab 02/14/24  1137 02/15/24  0518 02/16/24  0410    139 136   K 4.0 3.2* 4.4    111* 107   CO2 28 23 23   GLU 91 81 120*   BUN 13 9 12   CREATININE 0.8 0.7 0.7   CALCIUM 7.4* 6.3* 7.6*   PROT 5.8* 4.6* 5.0*   ALBUMIN 2.0* 1.2* 1.3*   BILITOT 0.3 0.4 0.3   ALKPHOS 137* 114 123   AST 20 19 18   ALT 11 10 11   ANIONGAP 5* 5* 6*         Significant Imaging: I have reviewed all pertinent imaging results/findings within the past 24 hours.

## 2024-02-16 NOTE — ASSESSMENT & PLAN NOTE
Had urinary retention during recent admission, discharged with [facility] and Urology follow-up. Will monitor output and re-attempt voiding trials after surgery.   Will try voiding trial 2/16

## 2024-02-16 NOTE — CONSULTS
[facility] - Surgery ([facility])  Infectious Disease  Consult Note    Patient Name: Froilan Ray  MRN: [id number]  Admission Date: 2/14/2024  Hospital Length of Stay: 1 days  Attending Physician: Mayte Rogers MD  Primary Care Provider: Alphonse Boothe III, MD     Isolation Status: No active isolations    Patient information was obtained from patient and ER records.      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 Patient refused SNF placement at the time of discharge. I saw her in clinic and there was concern regarding OPAT adherence.     She is now admitted to [facility] after a fall with direct impact over her left hip resulting in the displacement of her prosthesis, afebrile, hemodynamically stable, blood cultures with no growth, and without leukocytes. S/p operative intervention with ortho- I&D and revision placement of articulating cement coated antibiotic spacer left hip, operative cultures incubating.     Recommendation    - Continue IV daptomycin, no need for doxycycline (dual therapy 2/2 to prior non adherence) while in patient.     - Follow blood cultures and operative cultures. Most likely we will restart her timeline for PJI therapy with IV course of six weeks post operative intervention.     -trend CPK, recent elevation 2/2 to trauma, still ok to continue Daptomycin for now.     -not an OPAT candidate, discussed plan of care with the patient and primary team           Thank you for your consult. I will follow-up with patient. Please contact us if you have any additional questions.    Haroldo Morfin MD  Infectious Disease  [facility] - Surgery (2nd Fl)    Subjective:     Principal Problem: Status post Girdlestone procedure    HPI: 78 year ofd female with hx of cephalomedullary nail fixation left proximal femur who was admitted recently to [facility] for septic joint-  s/p I&D and antibiotic spacer placement on 1/19/24 and repeat I&D on 1/25, aspiration cultures with MRSA, hospital course notable for MRSA bacteremia which she cleared quickly, TTE neg, on IV daptomycin for six weeks with OPAT. Patient refused SNF placement at the time of discharge. I saw her in clinic and there was concern regarding OPAT adherence.     The patient states that she was using her walker, and then suddenly felt weak and fell with direct impact over her left hip prosthesis. She denies fevers, rigors, chills, purulence from surgical site incision. Imaging showed a fracture involving prosthesis of her left hip joint, she was seen by orthopedic surgery, and she subsequently was taken to the OR today for revision surgery, which involved some irrigation and debridement, operative cultures are incubating.     She is now admitted to [facility] after a fall with direct impact over her left hip resulting in a fracture of her prosthesis, afebrile, hemodynamically stable, blood cultures with no growth, and without leukocytes. S/p operative intervention with ortho- I&D and revision placement of articulating cement coated antibiotic spacer left hip, operative cultures incubating.         Past Medical History:   Diagnosis Date    Anemia     Arthritis     Bleeding ulcer 07/2016    Chronic pain     DDD (degenerative disc disease), cervical     DDD (degenerative disc disease), lumbar     Depression     Disc degeneration, lumbosacral     Diverticulitis     Encounter for blood transfusion     Hypertension     IBS (irritable  bowel syndrome)     Neuropathy of both feet     Seizures     none  since 2017    Umbilical hernia 2020       Past Surgical History:   Procedure Laterality Date    ARTHROPLASTY, HIP, GIRDLESTONE, POSTERIOR APPROACH Left 1/19/2024    Procedure: ARTHROPLASTY, HIP, GIRDLESTONE, POSTERIOR APPROACH;  Surgeon: Bulmaro Tellez MD;  [facility];  Service: Orthopedics;  Laterality: Left;    ARTHROPLASTY, HIP, GIRDLESTONE, POSTERIOR APPROACH Left 1/25/2024    Procedure: Irrigation and debridement left hip,;  Surgeon: Juanito Painting MD;  [facility];  Service: Orthopedics;  Laterality: Left;    BACK SURGERY      BREAST BIOPSY      BREAST SURGERY Right     lumpectomy    CAUDAL EPIDURAL STEROID INJECTION N/A 01/28/2022    Procedure: Injection-steroid-epidural-caudal;  Surgeon: Luzmaria Marcelino MD;  Location: [facility];  Service: Pain Management;  Laterality: N/A;    COLONOSCOPY N/A 01/14/2022    Procedure: COLONOSCOPY;  Surgeon: Abby Landers MD;  Location: [facility];  Service: Endoscopy;  Laterality: N/A;    ENDOSCOPIC ULTRASOUND OF UPPER GASTROINTESTINAL TRACT N/A 07/21/2020    Procedure: ULTRASOUND, UPPER GI TRACT, ENDOSCOPIC;  Surgeon: Marcio Nguyễn III, MD;  Location: [facility];  Service: Endoscopy;  Laterality: N/A;    ESOPHAGOGASTRODUODENOSCOPY N/A 01/14/2022    Procedure: EGD (ESOPHAGOGASTRODUODENOSCOPY);  Surgeon: Abby Landers MD;  Location: [facility];  Service: Endoscopy;  Laterality: N/A;    ESOPHAGOGASTRODUODENOSCOPY N/A 06/10/2022    Procedure: EGD (ESOPHAGOGASTRODUODENOSCOPY);  Surgeon: Abby Landers MD;  Location: [facility];  Service: Endoscopy;  Laterality: N/A;    EYE SURGERY      cataract    HYSTERECTOMY      INTRAMEDULLARY RODDING OF TROCHANTER OF FEMUR Left 12/11/2018    Procedure: INSERTION, INTRAMEDULLARY SANTOS, FEMUR, TROCHANTER;  Surgeon: Eulalio De La Cruz MD;  Location: [facility];  Service: Orthopedics;  Laterality: Left;    IRRIGATION AND  DEBRIDEMENT OF LOWER EXTREMITY Left 1/19/2024    Procedure: IRRIGATION AND DEBRIDEMENT, LOWER EXTREMITY;  Surgeon: Bulmaro Tellez MD;  [facility];  Service: Orthopedics;  Laterality: Left;    REPAIR OF EPIGASTRIC HERNIA N/A 12/7/2023    Procedure: REPAIR, HERNIA, EPIGASTRIC;  Surgeon: Vipul Escobar MD;  Location: [facility];  Service: General;  Laterality: N/A;    SPINAL CORD STIMULATOR IMPLANT  09/18/2013    and removal    SPINE SURGERY  2006    lumbar L2-S1 decompression.    SPINE SURGERY      cervical decompression    TONSILLECTOMY         Review of patient's allergies indicates:  No Known Allergies    Medications:  Medications Prior to Admission   Medication Sig    acetaminophen (TYLENOL) 325 MG tablet Take 650 mg by mouth every 8 (eight) hours as needed for Pain.    amlodipine-benazepril 5-20 mg (LOTREL) 5-20 mg per capsule Take 1 capsule by mouth once daily.    apixaban (ELIQUIS) 5 mg Tab Take 1 tablet (5 mg total) by mouth 2 (two) times daily.    cyclobenzaprine (FLEXERIL) 10 MG tablet Take 1 tablet (10 mg total) by mouth 3 (three) times daily as needed for Muscle spasms.    docusate sodium (COLACE) 100 MG capsule Take 1 capsule (100 mg total) by mouth 2 (two) times daily.    doxycycline (VIBRAMYCIN) 100 MG Cap Take 1 capsule (100 mg total) by mouth every 12 (twelve) hours.    EScitalopram oxalate (LEXAPRO) 20 MG tablet Take 1 tablet (20 mg total) by mouth every evening.    metoprolol tartrate (LOPRESSOR) 50 MG tablet Take 1 tablet (50 mg total) by mouth 2 (two) times daily.    nystatin (MYCOSTATIN) powder Apply to affected area 3 times daily    omeprazole (PRILOSEC) 40 MG capsule Take 1 capsule (40 mg total) by mouth every morning.    oxyCODONE-acetaminophen (PERCOCET)  mg per tablet Take 1 tablet by mouth every 4 to 6 hours as needed for Pain.    pregabalin (LYRICA) 200 MG Cap Take 1 capsule (200 mg total) by mouth 3 (three) times daily. TAKE 1 CAPSULE(200 MG) BY MOUTH THREE  TIMES DAILY    sodium chloride 0.9% SolP 50 mL with DAPTOmycin 500 mg SolR 500 mg Inject 500 mg into the vein once daily. End date 2/29/24    traMADoL (ULTRAM) 50 mg tablet Take 2 tablets by mouth every 12 (twelve) hours as needed for Pain.    valACYclovir (VALTREX) 1000 MG tablet Take 1 tablet (1,000 mg total) by mouth 2 (two) times daily. for 7 days     Antibiotics (From admission, onward)      Start     Stop Route Frequency Ordered    02/15/24 1600  DAPTOmycin (CUBICIN) 675 mg in sodium chloride 0.9% SolP 50 mL IVPB         -- IV Every 24 hours (non-standard times) 02/14/24 2154 02/14/24 2245  doxycycline tablet 100 mg         -- Oral Every 12 hours 02/14/24 2140          Antifungals (From admission, onward)      Start     Stop Route Frequency Ordered    02/15/24 0900  nystatin powder         -- Top 3 times daily 02/14/24 2140          Antivirals (From admission, onward)      None             Immunization History   Administered Date(s) Administered    COVID-19, MRNA, LN-S, PF (Pfizer) (Purple Cap) 07/27/2021, 08/18/2021    Influenza (FLUAD) - Quadrivalent - Adjuvanted - PF *Preferred* (65+) 10/20/2021, 09/29/2023    Influenza - Quadrivalent - PF *Preferred* (6 months and older) 09/22/2011, 09/06/2018    PPD Test 12/13/2018    Pneumococcal Conjugate - 13 Valent 10/20/2021    Pneumococcal Conjugate - 20 Valent 09/29/2023    Td (ADULT) 12/05/2012       Family History       Problem Relation (Age of Onset)    COPD Brother    Coronary artery disease Father    Depression Father    Diabetes Mother, Father, Sister, Brother    Heart disease Father    Hypertension Mother, Father    Irritable bowel syndrome Mother          Social History     Socioeconomic History    Marital status:    Tobacco Use    Smoking status: Never    Smokeless tobacco: Never   Substance and Sexual Activity    Alcohol use: No    Drug use: No    Sexual activity: Not Currently     Social Determinants of Health     Financial Resource Strain: Low  Risk  (1/20/2024)    Overall Financial Resource Strain (CARDIA)     Difficulty of Paying Living Expenses: Not hard at all   Food Insecurity: No Food Insecurity (1/20/2024)    Hunger Vital Sign     Worried About Running Out of Food in the Last Year: Never true     Ran Out of Food in the Last Year: Never true   Transportation Needs: No Transportation Needs (1/20/2024)    PRAPARE - Transportation     Lack of Transportation (Medical): No     Lack of Transportation (Non-Medical): No   Physical Activity: Insufficiently Active (12/2/2022)    Exercise Vital Sign     Days of Exercise per Week: 3 days     Minutes of Exercise per Session: 30 min   Stress: No Stress Concern Present (1/20/2024)    [employer] [employer] - Occupational Stress Questionnaire     Feeling of Stress : Not at all   Social Connections: Moderately Isolated (1/18/2024)    Social Connection and Isolation Panel [NHANES]     Frequency of Communication with Friends and Family: More than three times a week     Attends Sabianism Services: Never     Active Member of Clubs or Organizations: No     Attends Club or Organization Meetings: Never     Marital Status:    Housing Stability: Low Risk  (1/20/2024)    Housing Stability Vital Sign     Unable to Pay for Housing in the Last Year: No     Number of Places Lived in the Last Year: 1     Unstable Housing in the Last Year: No     Review of Systems   Constitutional:  Positive for activity change, appetite change and fatigue. Negative for chills and fever.   HENT:  Negative for trouble swallowing.    Respiratory:  Negative for cough and shortness of breath.    Gastrointestinal:  Negative for abdominal distention, blood in stool, diarrhea and vomiting.   Genitourinary:  Negative for dysuria and hematuria.   Musculoskeletal:  Positive for arthralgias and joint swelling. Negative for back pain.   Skin:  Positive for wound.   Allergic/Immunologic: Negative for immunocompromised state.    Psychiatric/Behavioral:  Negative for confusion.      Objective:     Vital Signs (Most Recent):  Temp: 98.8 °F (37.1 °C) (02/15/24 1141)  Pulse: 64 (02/15/24 1141)  Resp: 17 (02/15/24 1141)  BP: 129/60 (02/15/24 1141)  SpO2: (!) 94 % (02/15/24 1141) Vital Signs (24h Range):  Temp:  [98.4 °F (36.9 °C)-99.4 °F (37.4 °C)] 98.8 °F (37.1 °C)  Pulse:  [64-93] 64  Resp:  [16-21] 17  SpO2:  [94 %-99 %] 94 %  BP: (109-167)/(55-74) 129/60     Weight: 67.6 kg (149 lb)  Body mass index is 24.05 kg/m².    Estimated Creatinine Clearance: 64 mL/min (based on SCr of 0.7 mg/dL).     Physical Exam  Constitutional:       Appearance: She is ill-appearing. She is not toxic-appearing.   HENT:      Head: Normocephalic and atraumatic.      Nose: Nose normal.      Mouth/Throat:      Mouth: Mucous membranes are moist.      Pharynx: Oropharynx is clear.   Eyes:      Conjunctiva/sclera: Conjunctivae normal.   Cardiovascular:      Rate and Rhythm: Normal rate and regular rhythm.      Pulses: Normal pulses.      Heart sounds: Normal heart sounds.   Pulmonary:      Effort: Pulmonary effort is normal.      Breath sounds: Normal breath sounds.   Abdominal:      General: Bowel sounds are normal.      Palpations: Abdomen is soft.      Tenderness: There is no guarding or rebound.   Musculoskeletal:         General: Swelling, tenderness and deformity present.      Cervical back: Neck supple.      Comments: Left hip edema, tenderness on palpation, dry bandage over prior incision site  Severely limited ROM   Skin:     General: Skin is warm and dry.      Comments: UE picc c/d/I non tender   Neurological:      Mental Status: She is alert and oriented to person, place, and time. Mental status is at baseline.          Significant Labs:   Microbiology Results (last 7 days)       Procedure Component Value Units Date/Time    Blood culture [[id number]] Collected: 02/14/24 2241    Order Status: Completed Specimen: Blood Updated: 02/15/24 0715     Blood Culture,  Routine No Growth to date    Blood culture [[id number]] Collected: 02/14/24 2241    Order Status: Completed Specimen: Blood Updated: 02/15/24 0715     Blood Culture, Routine No Growth to date    Urine culture [[id number]] Collected: 02/14/24 2232    Order Status: No result Specimen: Urine Updated: 02/14/24 2312          All pertinent labs within the past 24 hours have been reviewed.    Significant Imaging: I have reviewed all pertinent imaging results/findings within the past 24 hours.

## 2024-02-16 NOTE — PLAN OF CARE
PT/OT eval completed. Recommending Skilled nursing care at D/C. Met with Patient's Son and spouse  to review discharge recommendation of Skilled and are agreeable to referrals being sent but plan for patient to d/c home with HH and Infusion once medically stable.    Patient/family provided list of facilities in-network with patient's payor plan. Providers that are owned, operated, or affiliated with [facility] are included on the list.     Notified that referral sent to below listed facilities from in-network list based on proximity to home/family support:   1[facility]  2.[facility]  3.[facility]  4[facility]  [facility]  [facility]    Patient/family instructed to identify preference.    Preferred Facility: (if more than 1, listed in order of descending preference)  1.[facility]    If an additional preferred facility not listed above is identified, additional referral to be sent. If above facilities unable to accept, will send additional referrals to in-network providers. BAILEY 2/19/24.      Kaye Lan RNCM  Case Management  [facility]  [phone]

## 2024-02-16 NOTE — TRANSFER OF CARE
"Anesthesia Transfer of Care Note    Patient: Froilan Ray    Procedure(s) Performed: Procedure(s) (LRB):  Revision hip antibiotic spacer head exchange, left, lateral, peg board, [facility], (Left)  IRRIGATION AND DEBRIDEMENT, LOWER EXTREMITY (Left)  FLAP GRAFT, LOCAL (Left)    Patient location: PACU    Anesthesia Type: general    Transport from OR: Transported from OR on 6-10 L/min O2 by face mask with adequate spontaneous ventilation    Post pain: pain needs to be addressed    Post assessment: no apparent anesthetic complications and tolerated procedure well    Post vital signs: stable    Level of consciousness: awake, alert and oriented    Nausea/Vomiting: no nausea/vomiting    Complications: none    Transfer of care protocol was followed      Last vitals: Visit Vitals  BP (!) 109/58   Pulse 80   Temp 36.6 °C (97.9 °F) (Temporal)   Resp 20   Ht 5' 6" (1.676 m)   Wt 67.6 kg (149 lb)   SpO2 99%   Breastfeeding No   BMI 24.05 kg/m²     "

## 2024-02-16 NOTE — ASSESSMENT & PLAN NOTE
"Patient has hypocalcemia due to Critical Illness which is currently uncontrolled, and has been confirmed with ionized and/or corrected calcium. Will replace calcium and monitor electrolytes closely. The latest calcium labs have been reviewed and are listed below.  Recent Labs   Lab 02/16/24  0410   CALCIUM 7.6*       No results for input(s): "CAION" in the last 24 hours.  -low at 8.5 corrected for albuminin the 1/s and replacemet started on 2/15 with improvements      "

## 2024-02-16 NOTE — PROGRESS NOTES
"[facility] Medicine  Progress Note    Patient Name: Froilan Ray  MRN: [id number]  Patient Class: IP- Inpatient   Admission Date: 2/14/2024  Length of Stay: 2 days  Attending Physician: Mayte Rogers MD  Primary Care Provider: Alphonse Boothe III, MD        Subjective:     Principal Problem:Status post Girdlestone procedure        HPI:  Froilan Ray is a 78 y.o. female with history of L hip surgery, HTN, HLD, CHF, chronic pain on opioids, peripheral neuropathy, and recent admission for MRSA septic arthritis requiring replacement of L hip hardware who was transferred from [facility] for L prosthetic hip fracture.     Patient reports that she was doing relatively well on the morning of presentation, able to ambulate with her walker without major issues. Unfortunately, while walking, her L leg "slipped," causing her to fall to the ground. She immediately noted L hip pain and inability to ambulate. She denies any pre-syncopal symptoms.She presented to the ED, where L hip fx was noted, therefore transferred here for ortho evaluation.     Overview/Hospital Course:  No notes on file    Interval history- she was sleeping this morning but easily awakened and reports pain controlled this morning. Was in OR for a long time yesterday for revision of antibiotic cement spacer yestserday with dr arnold with revision as well as debridement of nonviable tissue as well with CX taken of this area. ID saw patient yesterday and rec holding doxy and continuing daptomycin for traetment at this time, will likely restart timeline for 6 weeks of antibiotics following this surgery now and will f/u further recs. Hg stbale at 10 post op after transfusions yesterday on lab review today. Bp low todayand holding home BP meds to trend post op. Plan for TTWB for 6 weeks now per ortho recs. She has GNR in urine but unclear if truly symptomatic, on cefazolin for skin ppx coverage now and will plan to start another " coverage like rocephin pending CX data further tomorrow once cefazolin completes and with further Cx data from urine.       Review of patient's allergies indicates:  No Known Allergies    No current facility-administered medications on file prior to encounter.     Current Outpatient Medications on File Prior to Encounter   Medication Sig    acetaminophen (TYLENOL) 325 MG tablet Take 650 mg by mouth every 8 (eight) hours as needed for Pain.    amlodipine-benazepril 5-20 mg (LOTREL) 5-20 mg per capsule Take 1 capsule by mouth once daily.    apixaban (ELIQUIS) 5 mg Tab Take 1 tablet (5 mg total) by mouth 2 (two) times daily.    cyclobenzaprine (FLEXERIL) 10 MG tablet Take 1 tablet (10 mg total) by mouth 3 (three) times daily as needed for Muscle spasms.    docusate sodium (COLACE) 100 MG capsule Take 1 capsule (100 mg total) by mouth 2 (two) times daily.    doxycycline (VIBRAMYCIN) 100 MG Cap Take 1 capsule (100 mg total) by mouth every 12 (twelve) hours.    EScitalopram oxalate (LEXAPRO) 20 MG tablet Take 1 tablet (20 mg total) by mouth every evening.    metoprolol tartrate (LOPRESSOR) 50 MG tablet Take 1 tablet (50 mg total) by mouth 2 (two) times daily.    nystatin (MYCOSTATIN) powder Apply to affected area 3 times daily    omeprazole (PRILOSEC) 40 MG capsule Take 1 capsule (40 mg total) by mouth every morning.    oxyCODONE-acetaminophen (PERCOCET)  mg per tablet Take 1 tablet by mouth every 4 to 6 hours as needed for Pain.    pregabalin (LYRICA) 200 MG Cap Take 1 capsule (200 mg total) by mouth 3 (three) times daily. TAKE 1 CAPSULE(200 MG) BY MOUTH THREE TIMES DAILY    sodium chloride 0.9% SolP 50 mL with DAPTOmycin 500 mg SolR 500 mg Inject 500 mg into the vein once daily. End date 2/29/24    traMADoL (ULTRAM) 50 mg tablet Take 2 tablets by mouth every 12 (twelve) hours as needed for Pain.    valACYclovir (VALTREX) 1000 MG tablet Take 1 tablet (1,000 mg total) by mouth 2 (two) times daily. for 7 days     [DISCONTINUED] pantoprazole (PROTONIX) 40 MG tablet Take 1 tablet (40 mg total) by mouth 2 (two) times daily.     Family History       Problem Relation (Age of Onset)    COPD Brother    Coronary artery disease Father    Depression Father    Diabetes Mother, Father, Sister, Brother    Heart disease Father    Hypertension Mother, Father    Irritable bowel syndrome Mother          Tobacco Use    Smoking status: Never    Smokeless tobacco: Never   Substance and Sexual Activity    Alcohol use: No    Drug use: No    Sexual activity: Not Currently     Review of Systems   All other systems reviewed and are negative.    Objective:     Vital Signs (Most Recent):  Temp: 97.4 °F (36.3 °C) (02/16/24 0743)  Pulse: 66 (02/16/24 0743)  Resp: 16 (02/16/24 0743)  BP: (!) 113/59 (02/16/24 0743)  SpO2: 95 % (02/16/24 0743) Vital Signs (24h Range):  Temp:  [97.4 °F (36.3 °C)-98.8 °F (37.1 °C)] 97.4 °F (36.3 °C)  Pulse:  [61-83] 66  Resp:  [11-22] 16  SpO2:  [94 %-100 %] 95 %  BP: ()/(53-82) 113/59     Weight: 67.9 kg (149 lb 11.1 oz)  Body mass index is 24.16 kg/m².     Physical Exam  Vitals and nursing note reviewed.   Constitutional:       General: She is not in acute distress.     Appearance: She is well-developed. She is not diaphoretic.      Comments: Sleepy but wakes up easily. Mildly pale   HENT:      Head: Normocephalic and atraumatic.   Eyes:      General: No scleral icterus.     Conjunctiva/sclera: Conjunctivae normal.      Comments: Disconjugate gaze (baseline)   Neck:      Vascular: No JVD.   Cardiovascular:      Rate and Rhythm: Normal rate.   Pulmonary:      Effort: Pulmonary effort is normal. No respiratory distress.      Breath sounds: No wheezing.   Genitourinary:     Comments: Boyd in place    Musculoskeletal:      Right lower leg: Edema present.      Left lower leg: Edema present.      Comments: Bandages to LLE and drain with serosanginuous exudate. PICC in RUE    Skin:     Coloration: Skin is not jaundiced or  "pale.   Neurological:      Mental Status: She is alert and oriented to person, place, and time.      Motor: No abnormal muscle tone.   Psychiatric:         Mood and Affect: Mood normal.         Behavior: Behavior normal.                Significant Labs: All pertinent labs within the past 24 hours have been reviewed.  CBC:   Recent Labs   Lab 02/14/24  1137 02/15/24  0518 02/15/24  1947/24  0410   WBC 10.03 12.47  --  7.81   HGB 7.2* 7.8*  --  10.1*   HCT 23.8* 25.0* 33* 30.6*   * 481*  --  436       CMP:   Recent Labs   Lab 02/14/24  1137 02/15/24  0518 02/16/24  0410    139 136   K 4.0 3.2* 4.4    111* 107   CO2 28 23 23   GLU 91 81 120*   BUN 13 9 12   CREATININE 0.8 0.7 0.7   CALCIUM 7.4* 6.3* 7.6*   PROT 5.8* 4.6* 5.0*   ALBUMIN 2.0* 1.2* 1.3*   BILITOT 0.3 0.4 0.3   ALKPHOS 137* 114 123   AST 20 19 18   ALT 11 10 11   ANIONGAP 5* 5* 6*         Significant Imaging: I have reviewed all pertinent imaging results/findings within the past 24 hours.    Assessment/Plan:      * Antibiotic Spacer failure status post Girdlestone procedure  See fracture/ infection      Bacteriuria  GNR in urine, unclear if symptomatic julius with recent barrera use, on cefazolin now for skin coverage post op on 2/16, will f/u CX to see if need to start further coverage on 2/17 once this ends      Vitamin D deficiency  Low at 9 and ergo started      Hypocalcemia  Patient has hypocalcemia due to Critical Illness which is currently uncontrolled, and has been confirmed with ionized and/or corrected calcium. Will replace calcium and monitor electrolytes closely. The latest calcium labs have been reviewed and are listed below.  Recent Labs   Lab 02/16/24  0410   CALCIUM 7.6*       No results for input(s): "CAION" in the last 24 hours.  -low at 8.5 corrected for albuminin the 1/s and replacemet started on 2/15 with improvements        Urinary retention  Had urinary retention during recent admission, discharged with Barrera and " Urology follow-up. Will monitor output and re-attempt voiding trials after surgery.   Will try voiding trial 2/16      Staphylococcal arthritis of left hip  Recent MRSA arthritis during recent admission, planned for Daptomycin daily until 2/29 per ID recs. Will continue this now per ID recs, likely will need to restart timeline for 6 weeks per ID notes, will f/u recs further. Picc in place to RUE      Chronic heart failure with preserved ejection fraction  Recovered EF on recent TTE. Will monitor volume status and diurese as needed.       Hypokalemia  Patient has hypokalemia which is Acute and currently controlled. Most recent potassium levels reviewed-   Lab Results   Component Value Date    K 4.4 02/16/2024   . Will continue potassium replacement per protocol and recheck repeat levels after replacement completed.     Paroxysmal atrial fibrillation  Currently controlled and NSR on admit Continue home Metoprolol. Eliquis resumed post op    Iron deficiency anemia  Chronically severe, and stable around baseline. With baseline hg 7-9, tranfusions on 2/15 for acute blood loss anemia.    Peripheral neuropathy  Continue Lyrica.     Essential hypertension  Hold bp meds 2/16 for lower bps post op    Femur fracture, left  Recent revision of L hip replacement due to septic arthritis, and presents with pain following a fall to the left hip. CT shows acute fracture involving screw at junction of left femoral head and neck components of left hip hemiarthroplasty, with superior displacement of the distal fragment. Ortho consulted and had revision of spacer on 2/15, Revision placement of articulating cement coated antibiotic spacer left hip, Excisional and non excisional debridement and irrigation left hip, skin, subcutaneous tissue fascia and muscle  -TTWB for 6 weeks to LLE with posterior hip precautions per ortho, will need SNF as failed home treatments  -pain control with multimodals  -hg stable at 10 post op now, bowel  regimen  -CX pending, likely will need 6 weeks antibiotics restarted per ID, on dapto now  -drain in place post op       Chronic pain with uncomplicated opioid dependence  Continue home regimen, verified by PDMP with breakthrough meds available given acute fracture.         VTE Risk Mitigation (From admission, onward)           Ordered     apixaban tablet 2.5 mg  2 times daily         02/15/24 2051     Place sequential compression device  Until discontinued         02/15/24 1017     IP VTE HIGH RISK PATIENT  Once         02/15/24 1017     Place sequential compression device  Until discontinued         02/14/24 2333                    Discharge Planning   BAILEY: 2/19/2024     Code Status: Full Code   Is the patient medically ready for discharge?: No    Reason for patient still in hospital (select all that apply): Patient trending condition  Discharge Plan A: Skilled Nursing Facility                  Mayte Rogers MD  Department of Hospital Medicine   [facility] - Surgery

## 2024-02-16 NOTE — ASSESSMENT & PLAN NOTE
GNR in urine, unclear if symptomatic julius with recent barrera use, on cefazolin now for skin coverage post op on 2/16, will f/u CX to see if need to start further coverage on 2/17 once this ends

## 2024-02-16 NOTE — NURSING
Nurses Note -- 4 Eyes      2/16/2024   1:54 AM      Skin assessed during: Daily Assessment      [x] No Altered Skin Integrity Present    [x]Prevention Measures Documented      [] Yes- Altered Skin Integrity Present or Discovered   [] LDA Added if Not in Epic (Describe Wound)   [] New Altered Skin Integrity was Present on Admit and Documented in LDA   [] Wound Image Taken    Wound Care Consulted? No    Attending Nurse:  CORRIE Mcelroy RN    Second RN/Staff Member:   MELY Waller

## 2024-02-16 NOTE — PLAN OF CARE
Antibiotic Therapy Plan:    Please send referral to facility/ Infusion Pharmacy.    1) Infection: Left hip PJI  Simple cystitis    2) Discharge Antibiotics:    Intravenous antibiotics:  Daptomycin 8 mg/kg IV q 24 hours - for 6 weeks  Ceftriaxone 2 grams IV q 24 hours - for 3 days      3) Therapy Duration:  6 weeks f daptomycin  3 days of ceftriaxone    Estimated end date of IV daptomycin: 03/28/2024    Estimated end date of IV ceftriaxone: 02/18/2024    4) Outpatient Weekly Labs:    Order the following labs to be drawn on Mondays:   CBC  CMP   CRP    CPK (when on Daptomycin)      5) Fax Lab Results to Infectious Diseases Provider: Navjot    [facility] Fax Number: [phone]    6) Outpatient Infectious Diseases Follow-up    Follow-up appointment will be arranged by the ID clinic and will be found in the patient's appointments tab.    Prior to discharge, please ensure the patient's follow-up has been scheduled.    If there is still no follow-up scheduled prior to discharge, please send an EPIC message to Danyell Quinones in Infectious Diseases.

## 2024-02-17 PROBLEM — E83.39 HYPOPHOSPHATEMIA: Status: ACTIVE | Noted: 2024-02-17

## 2024-02-17 LAB
ALBUMIN SERPL BCP-MCNC: 1.3 G/DL (ref 3.5–5.2)
ALP SERPL-CCNC: 108 U/L (ref 55–135)
ALT SERPL W/O P-5'-P-CCNC: 6 U/L (ref 10–44)
ANION GAP SERPL CALC-SCNC: 6 MMOL/L (ref 8–16)
AST SERPL-CCNC: 17 U/L (ref 10–40)
BACTERIA UR CULT: ABNORMAL
BASOPHILS # BLD AUTO: 0.06 K/UL (ref 0–0.2)
BASOPHILS NFR BLD: 0.7 % (ref 0–1.9)
BILIRUB SERPL-MCNC: 0.2 MG/DL (ref 0.1–1)
BUN SERPL-MCNC: 15 MG/DL (ref 8–23)
CALCIUM SERPL-MCNC: 7.6 MG/DL (ref 8.7–10.5)
CHLORIDE SERPL-SCNC: 107 MMOL/L (ref 95–110)
CO2 SERPL-SCNC: 23 MMOL/L (ref 23–29)
CREAT SERPL-MCNC: 0.8 MG/DL (ref 0.5–1.4)
CRP SERPL-MCNC: 69.2 MG/L (ref 0–8.2)
DIFFERENTIAL METHOD BLD: ABNORMAL
EOSINOPHIL # BLD AUTO: 0.3 K/UL (ref 0–0.5)
EOSINOPHIL NFR BLD: 3.4 % (ref 0–8)
ERYTHROCYTE [DISTWIDTH] IN BLOOD BY AUTOMATED COUNT: 17.3 % (ref 11.5–14.5)
EST. GFR  (NO RACE VARIABLE): >60 ML/MIN/1.73 M^2
GLUCOSE SERPL-MCNC: 87 MG/DL (ref 70–110)
HCT VFR BLD AUTO: 27.5 % (ref 37–48.5)
HGB BLD-MCNC: 8.9 G/DL (ref 12–16)
IMM GRANULOCYTES # BLD AUTO: 0.06 K/UL (ref 0–0.04)
IMM GRANULOCYTES NFR BLD AUTO: 0.7 % (ref 0–0.5)
LYMPHOCYTES # BLD AUTO: 1.2 K/UL (ref 1–4.8)
LYMPHOCYTES NFR BLD: 13.3 % (ref 18–48)
MAGNESIUM SERPL-MCNC: 1.6 MG/DL (ref 1.6–2.6)
MCH RBC QN AUTO: 30 PG (ref 27–31)
MCHC RBC AUTO-ENTMCNC: 32.4 G/DL (ref 32–36)
MCV RBC AUTO: 93 FL (ref 82–98)
MONOCYTES # BLD AUTO: 0.7 K/UL (ref 0.3–1)
MONOCYTES NFR BLD: 8.1 % (ref 4–15)
NEUTROPHILS # BLD AUTO: 6.7 K/UL (ref 1.8–7.7)
NEUTROPHILS NFR BLD: 73.8 % (ref 38–73)
NRBC BLD-RTO: 0 /100 WBC
PHOSPHATE SERPL-MCNC: 1.9 MG/DL (ref 2.7–4.5)
PLATELET # BLD AUTO: 396 K/UL (ref 150–450)
PMV BLD AUTO: 9.2 FL (ref 9.2–12.9)
POTASSIUM SERPL-SCNC: 4 MMOL/L (ref 3.5–5.1)
PROT SERPL-MCNC: 4.9 G/DL (ref 6–8.4)
RBC # BLD AUTO: 2.97 M/UL (ref 4–5.4)
SODIUM SERPL-SCNC: 136 MMOL/L (ref 136–145)
WBC # BLD AUTO: 9.02 K/UL (ref 3.9–12.7)

## 2024-02-17 PROCEDURE — 25000003 PHARM REV CODE 250: Performed by: STUDENT IN AN ORGANIZED HEALTH CARE EDUCATION/TRAINING PROGRAM

## 2024-02-17 PROCEDURE — 83735 ASSAY OF MAGNESIUM: CPT | Performed by: HOSPITALIST

## 2024-02-17 PROCEDURE — 84100 ASSAY OF PHOSPHORUS: CPT | Performed by: HOSPITALIST

## 2024-02-17 PROCEDURE — 25000003 PHARM REV CODE 250

## 2024-02-17 PROCEDURE — 85025 COMPLETE CBC W/AUTO DIFF WBC: CPT

## 2024-02-17 PROCEDURE — 63600175 PHARM REV CODE 636 W HCPCS: Performed by: STUDENT IN AN ORGANIZED HEALTH CARE EDUCATION/TRAINING PROGRAM

## 2024-02-17 PROCEDURE — 86140 C-REACTIVE PROTEIN: CPT

## 2024-02-17 PROCEDURE — 11000001 HC ACUTE MED/SURG PRIVATE ROOM

## 2024-02-17 PROCEDURE — 94761 N-INVAS EAR/PLS OXIMETRY MLT: CPT

## 2024-02-17 PROCEDURE — 97110 THERAPEUTIC EXERCISES: CPT | Mod: CQ

## 2024-02-17 PROCEDURE — 80053 COMPREHEN METABOLIC PANEL: CPT

## 2024-02-17 PROCEDURE — 63600175 PHARM REV CODE 636 W HCPCS: Mod: JZ,JG | Performed by: INTERNAL MEDICINE

## 2024-02-17 PROCEDURE — 97530 THERAPEUTIC ACTIVITIES: CPT

## 2024-02-17 PROCEDURE — 25000003 PHARM REV CODE 250: Performed by: HOSPITALIST

## 2024-02-17 PROCEDURE — 63600175 PHARM REV CODE 636 W HCPCS

## 2024-02-17 PROCEDURE — 97112 NEUROMUSCULAR REEDUCATION: CPT | Mod: CQ

## 2024-02-17 RX ORDER — SODIUM,POTASSIUM PHOSPHATES 280-250MG
2 POWDER IN PACKET (EA) ORAL
Status: DISCONTINUED | OUTPATIENT
Start: 2024-02-17 | End: 2024-02-20 | Stop reason: HOSPADM

## 2024-02-17 RX ADMIN — PREGABALIN 200 MG: 150 CAPSULE ORAL at 05:02

## 2024-02-17 RX ADMIN — NYSTATIN: 100000 POWDER TOPICAL at 09:02

## 2024-02-17 RX ADMIN — PREGABALIN 200 MG: 150 CAPSULE ORAL at 09:02

## 2024-02-17 RX ADMIN — ACETAMINOPHEN 1000 MG: 500 TABLET ORAL at 12:02

## 2024-02-17 RX ADMIN — NYSTATIN: 100000 POWDER TOPICAL at 03:02

## 2024-02-17 RX ADMIN — DAPTOMYCIN 675 MG: 350 INJECTION, POWDER, LYOPHILIZED, FOR SOLUTION INTRAVENOUS at 02:02

## 2024-02-17 RX ADMIN — ACETAMINOPHEN 1000 MG: 500 TABLET ORAL at 05:02

## 2024-02-17 RX ADMIN — APIXABAN 2.5 MG: 2.5 TABLET, FILM COATED ORAL at 09:02

## 2024-02-17 RX ADMIN — POTASSIUM & SODIUM PHOSPHATES POWDER PACK 280-160-250 MG 2 PACKET: 280-160-250 PACK at 07:02

## 2024-02-17 RX ADMIN — ALTEPLASE 2 MG: 2.2 INJECTION, POWDER, LYOPHILIZED, FOR SOLUTION INTRAVENOUS at 12:02

## 2024-02-17 RX ADMIN — PREGABALIN 200 MG: 150 CAPSULE ORAL at 08:02

## 2024-02-17 RX ADMIN — APIXABAN 2.5 MG: 2.5 TABLET, FILM COATED ORAL at 08:02

## 2024-02-17 RX ADMIN — ACETAMINOPHEN 1000 MG: 500 TABLET ORAL at 01:02

## 2024-02-17 RX ADMIN — POTASSIUM & SODIUM PHOSPHATES POWDER PACK 280-160-250 MG 2 PACKET: 280-160-250 PACK at 08:02

## 2024-02-17 RX ADMIN — POTASSIUM & SODIUM PHOSPHATES POWDER PACK 280-160-250 MG 2 PACKET: 280-160-250 PACK at 11:02

## 2024-02-17 RX ADMIN — CHOLECALCIFEROL TAB 25 MCG (1000 UNIT) 1000 UNITS: 25 TAB at 09:02

## 2024-02-17 RX ADMIN — ESCITALOPRAM OXALATE 20 MG: 20 TABLET ORAL at 08:02

## 2024-02-17 RX ADMIN — OXYCODONE HYDROCHLORIDE 10 MG: 10 TABLET ORAL at 05:02

## 2024-02-17 RX ADMIN — CEFTRIAXONE SODIUM 2 G: 2 INJECTION, POWDER, FOR SOLUTION INTRAMUSCULAR; INTRAVENOUS at 04:02

## 2024-02-17 RX ADMIN — POTASSIUM & SODIUM PHOSPHATES POWDER PACK 280-160-250 MG 2 PACKET: 280-160-250 PACK at 05:02

## 2024-02-17 RX ADMIN — DOCUSATE SODIUM 100 MG: 100 CAPSULE, LIQUID FILLED ORAL at 08:02

## 2024-02-17 RX ADMIN — OXYCODONE HYDROCHLORIDE 10 MG: 10 TABLET ORAL at 09:02

## 2024-02-17 RX ADMIN — OXYCODONE HYDROCHLORIDE 10 MG: 10 TABLET ORAL at 02:02

## 2024-02-17 RX ADMIN — MORPHINE SULFATE 2 MG: 2 INJECTION, SOLUTION INTRAMUSCULAR; INTRAVENOUS at 03:02

## 2024-02-17 RX ADMIN — POLYETHYLENE GLYCOL 3350 17 G: 17 POWDER, FOR SOLUTION ORAL at 09:02

## 2024-02-17 RX ADMIN — CALCIUM CARBONATE (ANTACID) CHEW TAB 500 MG 1000 MG: 500 CHEW TAB at 09:02

## 2024-02-17 RX ADMIN — DOCUSATE SODIUM 100 MG: 100 CAPSULE, LIQUID FILLED ORAL at 09:02

## 2024-02-17 NOTE — PROGRESS NOTES
"[facility] Hospital Medicine  Progress Note    Patient Name: Froilan Ray  MRN: [id number]  Patient Class: IP- Inpatient   Admission Date: 2/14/2024  Length of Stay: 3 days  Attending Physician: Mayte Rogers MD  Primary Care Provider: Alphonse Boothe III, MD        Subjective:     Principal Problem:Status post Girdlestone procedure        HPI:  Froilan Ray is a 78 y.o. female with history of L hip surgery, HTN, HLD, CHF, chronic pain on opioids, peripheral neuropathy, and recent admission for MRSA septic arthritis requiring replacement of L hip hardware who was transferred from [facility] for L prosthetic hip fracture.     Patient reports that she was doing relatively well on the morning of presentation, able to ambulate with her walker without major issues. Unfortunately, while walking, her L leg "slipped," causing her to fall to the ground. She immediately noted L hip pain and inability to ambulate. She denies any pre-syncopal symptoms.She presented to the ED, where L hip fx was noted, therefore transferred here for ortho evaluation.     Overview/Hospital Course:  No notes on file    Interval history- she worked with PT this morning and reports she didn't feel like she did that well with them and having some pain after session that is revving up now and having nursing bring her pain meds for this now. Phos low on labs and replacement started on lab review. Hg at 8.9 and trending closely with expected losses after surgery and continue to hold bp meds as normotensive now and lower bps yesterday post op. ID reports will resume her daptomycin for 6 weeks after this surgery and end date 3/28. They do not feel is safe to do antibiotics at home given failure now with fall, per notes yazmin prefers  and will have to discuss more given this, I discussed with her the importance of SNF after this hospital stay given fall and fx after last discharge and she did not seem to be adverse to this on discussino " today. K pneumo on urine CX and 3 days of rocephin per ID resc for coverage now. Drain still in place on exam with serosanginous output.      Review of patient's allergies indicates:  No Known Allergies    No current facility-administered medications on file prior to encounter.     Current Outpatient Medications on File Prior to Encounter   Medication Sig    acetaminophen (TYLENOL) 325 MG tablet Take 650 mg by mouth every 8 (eight) hours as needed for Pain.    amlodipine-benazepril 5-20 mg (LOTREL) 5-20 mg per capsule Take 1 capsule by mouth once daily.    apixaban (ELIQUIS) 5 mg Tab Take 1 tablet (5 mg total) by mouth 2 (two) times daily.    cyclobenzaprine (FLEXERIL) 10 MG tablet Take 1 tablet (10 mg total) by mouth 3 (three) times daily as needed for Muscle spasms.    docusate sodium (COLACE) 100 MG capsule Take 1 capsule (100 mg total) by mouth 2 (two) times daily.    doxycycline (VIBRAMYCIN) 100 MG Cap Take 1 capsule (100 mg total) by mouth every 12 (twelve) hours.    EScitalopram oxalate (LEXAPRO) 20 MG tablet Take 1 tablet (20 mg total) by mouth every evening.    metoprolol tartrate (LOPRESSOR) 50 MG tablet Take 1 tablet (50 mg total) by mouth 2 (two) times daily.    nystatin (MYCOSTATIN) powder Apply to affected area 3 times daily    omeprazole (PRILOSEC) 40 MG capsule Take 1 capsule (40 mg total) by mouth every morning.    oxyCODONE-acetaminophen (PERCOCET)  mg per tablet Take 1 tablet by mouth every 4 to 6 hours as needed for Pain.    pregabalin (LYRICA) 200 MG Cap Take 1 capsule (200 mg total) by mouth 3 (three) times daily. TAKE 1 CAPSULE(200 MG) BY MOUTH THREE TIMES DAILY    sodium chloride 0.9% SolP 50 mL with DAPTOmycin 500 mg SolR 500 mg Inject 500 mg into the vein once daily. End date 2/29/24    traMADoL (ULTRAM) 50 mg tablet Take 2 tablets by mouth every 12 (twelve) hours as needed for Pain.    valACYclovir (VALTREX) 1000 MG tablet Take 1 tablet (1,000 mg total) by mouth 2 (two) times daily.  for 7 days    [DISCONTINUED] pantoprazole (PROTONIX) 40 MG tablet Take 1 tablet (40 mg total) by mouth 2 (two) times daily.     Family History       Problem Relation (Age of Onset)    COPD Brother    Coronary artery disease Father    Depression Father    Diabetes Mother, Father, Sister, Brother    Heart disease Father    Hypertension Mother, Father    Irritable bowel syndrome Mother          Tobacco Use    Smoking status: Never    Smokeless tobacco: Never   Substance and Sexual Activity    Alcohol use: No    Drug use: No    Sexual activity: Not Currently     Review of Systems   All other systems reviewed and are negative.    Objective:     Vital Signs (Most Recent):  Temp: 98.4 °F (36.9 °C) (02/17/24 0719)  Pulse: 78 (02/17/24 0719)  Resp: 18 (02/17/24 0918)  BP: (!) 125/58 (02/17/24 0719)  SpO2: (!) 94 % (02/17/24 0719) Vital Signs (24h Range):  Temp:  [97.7 °F (36.5 °C)-98.4 °F (36.9 °C)] 98.4 °F (36.9 °C)  Pulse:  [71-81] 78  Resp:  [17-20] 18  SpO2:  [92 %-97 %] 94 %  BP: (105-125)/(52-59) 125/58     Weight: 67.9 kg (149 lb 11.1 oz)  Body mass index is 24.16 kg/m².     Physical Exam  Vitals and nursing note reviewed.   Constitutional:       General: She is not in acute distress.     Appearance: She is well-developed. She is not diaphoretic.      Comments: Awake on exam. Mildly pale   HENT:      Head: Normocephalic and atraumatic.   Eyes:      General: No scleral icterus.     Conjunctiva/sclera: Conjunctivae normal.      Comments: Disconjugate gaze (baseline)   Neck:      Vascular: No JVD.   Cardiovascular:      Rate and Rhythm: Normal rate.   Pulmonary:      Effort: Pulmonary effort is normal. No respiratory distress.      Breath sounds: No wheezing.   Musculoskeletal:      Right lower leg: Edema present.      Left lower leg: Edema present.      Comments: Bandages to LLE and drain with serosanginuous exudate. PICC in RUE    Skin:     Coloration: Skin is not jaundiced or pale.   Neurological:      Mental Status:  "She is alert and oriented to person, place, and time.      Motor: No abnormal muscle tone.   Psychiatric:         Mood and Affect: Mood normal.         Behavior: Behavior normal.                Significant Labs: All pertinent labs within the past 24 hours have been reviewed.  CBC:   Recent Labs   Lab 02/15/24  1947/24  0410 02/17/24  0550   WBC  --  7.81 9.02   HGB  --  10.1* 8.9*   HCT 33* 30.6* 27.5*   PLT  --  436 396       CMP:   Recent Labs   Lab 02/16/24 0410 02/17/24  0550    136   K 4.4 4.0    107   CO2 23 23   * 87   BUN 12 15   CREATININE 0.7 0.8   CALCIUM 7.6* 7.6*   PROT 5.0* 4.9*   ALBUMIN 1.3* 1.3*   BILITOT 0.3 0.2   ALKPHOS 123 108   AST 18 17   ALT 11 6*   ANIONGAP 6* 6*         Significant Imaging: I have reviewed all pertinent imaging results/findings within the past 24 hours.    Assessment/Plan:      * Antibiotic Spacer failure status post Girdlestone procedure  See fracture/ infection      Hypophosphatemia  Patient has Abnormal Phosphorus: hypophosphatemia. Will continue to monitor electrolytes closely. Will replace the affected electrolytes and repeat labs to be done after interventions completed. The patient's phosphorus results have been reviewed and are listed below.  Recent Labs   Lab 02/17/24  0550   PHOS 1.9*        Bacteriuria  K pneumo in urine cx, 3 days rocephin per ID recs      Vitamin D deficiency  Low at 9 and ergo started      Hypocalcemia  Patient has hypocalcemia due to Critical Illness which is currently uncontrolled, and has been confirmed with ionized and/or corrected calcium. Will replace calcium and monitor electrolytes closely. The latest calcium labs have been reviewed and are listed below.  Recent Labs   Lab 02/16/24  0410   CALCIUM 7.6*       No results for input(s): "CAION" in the last 24 hours.  -low at 8.5 corrected for albuminin the 1/s and replacemet started on 2/15 with improvements        Urinary retention  Had urinary retention during " recent admission, discharged with Boyd and Urology follow-up. Will monitor output and re-attempt voiding trials after surgery.   Will try voiding trial 2/16      Staphylococcal arthritis of left hip  Recent MRSA arthritis during recent admission, planned for Daptomycin daily until 2/29 per ID recs. Will continue this now per ID recs, likely will need to restart timeline for 6 weeks per ID notes and rec end date of 3/28 with weekly labs. They rec going to SNf for antibiotics and will discuss with patient family further as they prefer HH per cm notes Picc in place to RUE      Chronic heart failure with preserved ejection fraction  Recovered EF on recent TTE. Will monitor volume status and diurese as needed.       Hypokalemia  Patient has hypokalemia which is Acute and currently controlled. Most recent potassium levels reviewed-   Lab Results   Component Value Date    K 4.4 02/16/2024   . Will continue potassium replacement per protocol and recheck repeat levels after replacement completed.     Paroxysmal atrial fibrillation  Currently controlled and NSR on admit Continue home Metoprolol. Eliquis resumed post op    Iron deficiency anemia  Chronically severe, and stable around baseline. With baseline hg 7-9, tranfusions on 2/15 for acute blood loss anemia.    Peripheral neuropathy  Continue Lyrica.     Essential hypertension  Hold bp meds 2/16 for lower bps post op    Femur fracture, left  Recent revision of L hip replacement due to septic arthritis, and presents with pain following a fall to the left hip. CT shows acute fracture involving screw at junction of left femoral head and neck components of left hip hemiarthroplasty, with superior displacement of the distal fragment. Ortho consulted and had revision of spacer on 2/15, Revision placement of articulating cement coated antibiotic spacer left hip, Excisional and non excisional debridement and irrigation left hip, skin, subcutaneous tissue fascia and muscle  -TTWB  for 6 weeks to LLE with posterior hip precautions per ortho, will need SNF as failed home treatments but fam hesitant per notes, will discuss further with them. Patient discussed 2/17 and did not seem that averse  -pain control with multimodals  -hg stable at 10--8.9 post op now, bowel regimen  -CX NG so far, will need 6 weeks antibiotics restarted per ID, on dapto now  -drain in place post op       Chronic pain with uncomplicated opioid dependence  Continue home regimen, verified by PDMP with breakthrough meds available given acute fracture.         VTE Risk Mitigation (From admission, onward)           Ordered     apixaban tablet 2.5 mg  2 times daily         02/15/24 2051     Place sequential compression device  Until discontinued         02/15/24 1017     IP VTE HIGH RISK PATIENT  Once         02/15/24 1017     Place sequential compression device  Until discontinued         02/14/24 2333                    Discharge Planning   BAILEY: 2/19/2024     Code Status: Full Code   Is the patient medically ready for discharge?: No    Reason for patient still in hospital (select all that apply): Patient trending condition  Discharge Plan A: Home with family, Home Health                  Mayte Rogers MD  Department of Hospital Medicine   [facility] - Surgery

## 2024-02-17 NOTE — PROGRESS NOTES
[facility] - [facility]  Orthopedics  Progress Note    Patient Name: Froilan Ray  MRN: [id number]  Admission Date: 2/14/2024  Hospital Length of Stay: 3 days  Attending Provider: Mayte Rogers MD  Primary Care Provider: Alphonse Boothe III, MD  Follow-up For: Procedure(s) (LRB):  Revision hip antibiotic spacer head exchange, left, lateral, peg board, depuy osteomed in room, vanc, ancef, txa, (Left)  IRRIGATION AND DEBRIDEMENT, LOWER EXTREMITY (Left)  FLAP GRAFT, LOCAL (Left)    Post-Operative Day: 2 Days Post-Op  Subjective:     Principal Problem:Status post Girdlestone procedure    Principal Orthopedic Problem: s/p left hip revision antibiotic spacer placement on 2/15    Interval History: NAEON. VSS. L hip pain controlled. Drain output 80cc overnight, Hgb 10.1. NGTD from intraoperative cultures. Stood with maximal assistance, PT recommending moderate intensity therapy.    Review of patient's allergies indicates:  No Known Allergies    Current Facility-Administered Medications   Medication    acetaminophen tablet 1,000 mg    apixaban tablet 2.5 mg    bisacodyL suppository 10 mg    calcium carbonate 200 mg calcium (500 mg) chewable tablet 1,000 mg    cefTRIAXone (ROCEPHIN) 2 g in dextrose 5 % in water (D5W) 100 mL IVPB (MB+)    cyclobenzaprine tablet 10 mg    DAPTOmycin (CUBICIN) 675 mg in sodium chloride 0.9% SolP 50 mL IVPB    docusate sodium capsule 100 mg    ergocalciferol capsule 50,000 Units    EScitalopram oxalate tablet 20 mg    melatonin tablet 6 mg    oxyCODONE immediate release tablet 5 mg    And    oxyCODONE immediate release tablet Tab 10 mg    And    morphine injection 2 mg    nystatin powder    ondansetron injection 4 mg    polyethylene glycol packet 17 g    pregabalin capsule 200 mg    sodium chloride 0.9% flush 10 mL    sodium chloride 0.9% flush 10 mL    vitamin D 1000 units tablet 1,000 Units     Objective:     Vital Signs (Most Recent):  Temp: 98.4 °F (36.9 °C) (02/17/24 0451)  Pulse: 80  "(02/17/24 0451)  Resp: 18 (02/17/24 0451)  BP: (!) 111/59 (02/17/24 0451)  SpO2: (!) 92 % (02/17/24 0451) Vital Signs (24h Range):  Temp:  [97.4 °F (36.3 °C)-98.4 °F (36.9 °C)] 98.4 °F (36.9 °C)  Pulse:  [66-81] 80  Resp:  [16-20] 18  SpO2:  [92 %-97 %] 92 %  BP: (105-120)/(52-59) 111/59     Weight: 67.9 kg (149 lb 11.1 oz)  Height: 5' 6" (167.6 cm)  Body mass index is 24.16 kg/m².      Intake/Output Summary (Last 24 hours) at 2/17/2024 0632  Last data filed at 2/17/2024 0622  Gross per 24 hour   Intake --   Output 390 ml   Net -390 ml         Ortho/SPM Exam     Awake/alert/oriented x3, No acute distress, Afebrile, Vital signs stable  Good inspiratory effort with unlaboured breathing  Dressings c/d/i  NVI in operative limb     Significant Labs: All pertinent labs within the past 24 hours have been reviewed.    Significant Imaging: I have reviewed all pertinent imaging results/findings.  Assessment/Plan:     * Antibiotic Spacer failure status post Girdlestone procedure  Froilan Ray is a 78 y.o. female with PMH of MRSA bacteremia and left-sided hip septic arthritis status post Girdlestone placement with antibiotic spacer presenting with failure of the left-sided antibiotic spacer head dissociation after fall    Now s/p left hip revision antibiotic spacer on 2/15  ID final recommendations in place.    Pain control: MM  PT/OT: WBAT LLE  DVT PPx: eliquis 2.5 bid, SCDs at all times when not ambulating  Abx: postop Ancef and daptomycin  Cx: prior OR cx MRSA, cx 2/15 NGTD  Labs: Hgb 10.1 today   Drain: 80cc overnight  Boyd: DC 2/16    Dispo: continue to work with PT, pending placement            ROCIO DRUMMOND MD  Orthopedics  [facility] - Surgery    "

## 2024-02-17 NOTE — SUBJECTIVE & OBJECTIVE
Interval history- she worked with PT this morning and reports she didn't feel like she did that well with them and having some pain after session that is revving up now and having nursing bring her pain meds for this now. Phos low on labs and replacement started on lab review. Hg at 8.9 and trending closely with expected losses after surgery and continue to hold bp meds as normotensive now and lower bps yesterday post op. ID reports will resume her daptomycin for 6 weeks after this surgery and end date 3/28. They do not feel is safe to do antibiotics at home given failure now with fall, per notes fam prefers HH and will have to discuss more given this, I discussed with her the importance of SNF after this hospital stay given fall and fx after last discharge and she did not seem to be adverse to this on discussino today. K pneumo on urine CX and 3 days of rocephin per ID resc for coverage now. Drain still in place on exam with serosanginous output.      Review of patient's allergies indicates:  No Known Allergies    No current facility-administered medications on file prior to encounter.     Current Outpatient Medications on File Prior to Encounter   Medication Sig    acetaminophen (TYLENOL) 325 MG tablet Take 650 mg by mouth every 8 (eight) hours as needed for Pain.    amlodipine-benazepril 5-20 mg (LOTREL) 5-20 mg per capsule Take 1 capsule by mouth once daily.    apixaban (ELIQUIS) 5 mg Tab Take 1 tablet (5 mg total) by mouth 2 (two) times daily.    cyclobenzaprine (FLEXERIL) 10 MG tablet Take 1 tablet (10 mg total) by mouth 3 (three) times daily as needed for Muscle spasms.    docusate sodium (COLACE) 100 MG capsule Take 1 capsule (100 mg total) by mouth 2 (two) times daily.    doxycycline (VIBRAMYCIN) 100 MG Cap Take 1 capsule (100 mg total) by mouth every 12 (twelve) hours.    EScitalopram oxalate (LEXAPRO) 20 MG tablet Take 1 tablet (20 mg total) by mouth every evening.    metoprolol tartrate (LOPRESSOR) 50 MG  tablet Take 1 tablet (50 mg total) by mouth 2 (two) times daily.    nystatin (MYCOSTATIN) powder Apply to affected area 3 times daily    omeprazole (PRILOSEC) 40 MG capsule Take 1 capsule (40 mg total) by mouth every morning.    oxyCODONE-acetaminophen (PERCOCET)  mg per tablet Take 1 tablet by mouth every 4 to 6 hours as needed for Pain.    pregabalin (LYRICA) 200 MG Cap Take 1 capsule (200 mg total) by mouth 3 (three) times daily. TAKE 1 CAPSULE(200 MG) BY MOUTH THREE TIMES DAILY    sodium chloride 0.9% SolP 50 mL with DAPTOmycin 500 mg SolR 500 mg Inject 500 mg into the vein once daily. End date 2/29/24    traMADoL (ULTRAM) 50 mg tablet Take 2 tablets by mouth every 12 (twelve) hours as needed for Pain.    valACYclovir (VALTREX) 1000 MG tablet Take 1 tablet (1,000 mg total) by mouth 2 (two) times daily. for 7 days    [DISCONTINUED] pantoprazole (PROTONIX) 40 MG tablet Take 1 tablet (40 mg total) by mouth 2 (two) times daily.     Family History       Problem Relation (Age of Onset)    COPD Brother    Coronary artery disease Father    Depression Father    Diabetes Mother, Father, Sister, Brother    Heart disease Father    Hypertension Mother, Father    Irritable bowel syndrome Mother          Tobacco Use    Smoking status: Never    Smokeless tobacco: Never   Substance and Sexual Activity    Alcohol use: No    Drug use: No    Sexual activity: Not Currently     Review of Systems   All other systems reviewed and are negative.    Objective:     Vital Signs (Most Recent):  Temp: 98.4 °F (36.9 °C) (02/17/24 0719)  Pulse: 78 (02/17/24 0719)  Resp: 18 (02/17/24 0918)  BP: (!) 125/58 (02/17/24 0719)  SpO2: (!) 94 % (02/17/24 0719) Vital Signs (24h Range):  Temp:  [97.7 °F (36.5 °C)-98.4 °F (36.9 °C)] 98.4 °F (36.9 °C)  Pulse:  [71-81] 78  Resp:  [17-20] 18  SpO2:  [92 %-97 %] 94 %  BP: (105-125)/(52-59) 125/58     Weight: 67.9 kg (149 lb 11.1 oz)  Body mass index is 24.16 kg/m².     Physical Exam  Vitals and nursing  note reviewed.   Constitutional:       General: She is not in acute distress.     Appearance: She is well-developed. She is not diaphoretic.      Comments: Awake on exam. Mildly pale   HENT:      Head: Normocephalic and atraumatic.   Eyes:      General: No scleral icterus.     Conjunctiva/sclera: Conjunctivae normal.      Comments: Disconjugate gaze (baseline)   Neck:      Vascular: No JVD.   Cardiovascular:      Rate and Rhythm: Normal rate.   Pulmonary:      Effort: Pulmonary effort is normal. No respiratory distress.      Breath sounds: No wheezing.   Musculoskeletal:      Right lower leg: Edema present.      Left lower leg: Edema present.      Comments: Bandages to LLE and drain with serosanginuous exudate. PICC in RUE    Skin:     Coloration: Skin is not jaundiced or pale.   Neurological:      Mental Status: She is alert and oriented to person, place, and time.      Motor: No abnormal muscle tone.   Psychiatric:         Mood and Affect: Mood normal.         Behavior: Behavior normal.                Significant Labs: All pertinent labs within the past 24 hours have been reviewed.  CBC:   Recent Labs   Lab 02/15/24  1947/24  0410 02/17/24  0550   WBC  --  7.81 9.02   HGB  --  10.1* 8.9*   HCT 33* 30.6* 27.5*   PLT  --  436 396       CMP:   Recent Labs   Lab 02/16/24  0410 02/17/24  0550    136   K 4.4 4.0    107   CO2 23 23   * 87   BUN 12 15   CREATININE 0.7 0.8   CALCIUM 7.6* 7.6*   PROT 5.0* 4.9*   ALBUMIN 1.3* 1.3*   BILITOT 0.3 0.2   ALKPHOS 123 108   AST 18 17   ALT 11 6*   ANIONGAP 6* 6*         Significant Imaging: I have reviewed all pertinent imaging results/findings within the past 24 hours.

## 2024-02-17 NOTE — ASSESSMENT & PLAN NOTE
Froilan Ray is a 78 y.o. female with PMH of MRSA bacteremia and left-sided hip septic arthritis status post Girdlestone placement with antibiotic spacer presenting with failure of the left-sided antibiotic spacer head dissociation after fall    Now s/p left hip revision antibiotic spacer on 2/15      Pain control: MM  PT/OT: WBAT LLE  DVT PPx: eliquis 2.5 bid, SCDs at all times when not ambulating  Abx: postop Ancef and daptomycin  Cx: prior OR cx MRSA, cx 2/15 NGTD  Labs: Hgb 10.1 today   Drain: 80cc overnight  Boyd: DC 2/16    Dispo: continue to work with PT, pending placement

## 2024-02-17 NOTE — PT/OT/SLP PROGRESS
"Occupational Therapy   Co-Treatment  Co-treatment performed due to patient's multiple deficits requiring two skilled therapists to appropriately and safely assess patient's strength and endurance while facilitating functional tasks in addition to accommodating for patient's activity tolerance.        Name: Froilan Ray  MRN: [id number]  Admitting Diagnosis:  Status post Girdlestone procedure  2 Days Post-Op    Recommendations:     Discharge Recommendations: Moderate Intensity Therapy  Discharge Equipment Recommendations:  none  Barriers to discharge:  Other (Comment) (Increased assistance with ADLs & functional mobility/transfers)    Assessment:     Froilan Ray is a 78 y.o. female with a medical diagnosis of Status post Girdlestone procedure.  She presents with the following performance deficits affecting function are weakness, orthopedic precautions, pain, impaired self care skills, impaired functional mobility, impaired balance, decreased lower extremity function. Pt agreeable to therapy but did not tolerate well. Pt is demonstrating poor activity tolerance secondary to increased pain in the LLE. Pt remains limited in ADLs, functional mobility, and functional transfers and is currently not performing tasks at PLOF. Pt would continue to benefit from skilled OT services to maximize functional independence with ADLs and functional mobility, reduce caregiver burden, and facilitate safe discharge in the least restrictive environment.      Rehab Prognosis:  Good; patient would benefit from acute skilled OT services to address these deficits and reach maximum level of function.       Plan:     Patient to be seen daily (Hip pathway 02/16-02/18) to address the above listed problems via self-care/home management, therapeutic activities, therapeutic exercises  Plan of Care Expires: 03/17/24  Plan of Care Reviewed with: patient    Subjective     Chief Complaint: pain  Patient/Family Comments/goals: "I'm sorry, I " "can't."  Pain/Comfort:  Pain Rating 1: 5/10  Location - Side 1: Left  Location - Orientation 1: generalized  Location 1: leg  Pain Addressed 1: Reposition, Cessation of Activity, Distraction    Objective:     Communicated with: RN prior to session.  Patient found HOB elevated with SCD, PICC line, peripheral IV, BEULAH drain upon OT entry to room.    General Precautions: Standard, fall    Orthopedic Precautions:LLE posterior precautions, LLE toe touch weight bearing  Braces: N/A  Respiratory Status: Room air     Occupational Performance:     Bed Mobility:    Patient completed Scooting to HOB with total assistance and 2 persons  Patient completed Supine to Sit with moderate assistance and 2 persons  Patient completed Sit to Supine with moderate assistance     Functional Mobility/Transfers:  Patient completed Sit <> Stand Transfer with moderate assistance and of 2 persons  with  rolling walker   Pt required verbal/tactile cueing to maintain L TTWB precautions  Functional Mobility: pt unable to ambulate 2/2 pain    Activities of Daily Living:  Lower body dressing: pt required total assistance to don/dof socks  Pt declined ADLs    [facility] 6 Click ADL: 16    Treatment & Education:  -Education on task modification to maximize safety and (I) during ADLs and mobility  -Education on importance of OOB activity to improve overall activity tolerance and promote recovery  -Pt educated to call for assistance and to transfer with hospital staff only  -Provided education regarding role of OT & POC with pt verbalizing understanding. Pt had no further questions & when asked whether there were any concerns pt reported none.     Patient left HOB elevated with all lines intact, call button in reach, and RN notified    GOALS:   Multidisciplinary Problems       Occupational Therapy Goals          Problem: Occupational Therapy    Goal Priority Disciplines Outcome Interventions   Occupational Therapy Goal     OT, PT/OT Ongoing, Progressing  "   Description: Goals to be met by: 3/17/24     Patient will increase functional independence with ADLs by performing:    LE Dressing with Moderate Assistance.  Grooming while standing at sink with Stand-by Assistance.  Toileting from bedside commode with Moderate Assistance for hygiene and clothing management.   Toilet transfer to bedside commode with Moderate Assistance.                         Time Tracking:     OT Date of Treatment: 02/17/24  OT Start Time: 0837  OT Stop Time: 0900  OT Total Time (min): 23 min    Billable Minutes:Therapeutic Activity 23    OT/PREETI: OT          2/17/2024

## 2024-02-17 NOTE — PT/OT/SLP PROGRESS
Physical Therapy Co-Treatment  Co-treatment performed due to patient's multiple deficits requiring two skilled therapists to appropriately and safely assess patient's strength and endurance while facilitating functional tasks in addition to accommodating for patient's activity tolerance.       Patient Name:  Froilan Ray   MRN:  [id number]    Recommendations:     Discharge Recommendations: Moderate Intensity Therapy  Discharge Equipment Recommendations: none  Barriers to discharge: Increased assistance with  functional mobility/transfers)       Assessment:     Froilan Ray is a 78 y.o. female admitted with a medical diagnosis of Status post Girdlestone procedure.  She presents with the following impairments/functional limitations: weakness, gait instability, impaired balance, impaired self care skills, impaired functional mobility, pain, decreased safety awareness, decreased lower extremity function .Pt  tolerated treatment fairly well and is progressing slowly with mobility. pt limited due to fatigue and pain. Pt required manual and vc to maintain TTWB status Patient remains appropriate for continued skilled services within the acute environment and goals remain appropriate.        Rehab Prognosis: Good; patient would benefit from acute skilled PT services to address these deficits and reach maximum level of function.    Recent Surgery: Procedure(s) (LRB):  Revision hip antibiotic spacer head exchange, left, lateral, peg board, depuy osteomed in room, vanc, ancef, txa, (Left)  IRRIGATION AND DEBRIDEMENT, LOWER EXTREMITY (Left)  FLAP GRAFT, LOCAL (Left) 2 Days Post-Op    Plan:     During this hospitalization, patient to be seen daily to address the identified rehab impairments via gait training, therapeutic activities, therapeutic exercises, neuromuscular re-education and progress toward the following goals:    Plan of Care Expires:  03/17/24    Subjective     Chief Complaint: pain  Patient/Family  Comments/goals: I am sorry, I can't do this!  Pain/Comfort:  Pain Rating 1: 5/10  Location - Side 1: Left  Location - Orientation 1: generalized  Location 1: leg  Pain Addressed 1: Reposition, Distraction, Cessation of Activity      Objective:     Communicated with RN prior to session.  Patient found HOB elevated with  (SCD; PICC line; peripheral IV; BEULAH drain) upon PT entry to room.     General Precautions: Standard, fall  Orthopedic Precautions:  (LLE posterior precautions; LLE toe touch weight bearing)  Braces: N/A  Respiratory Status: Room air     Functional Mobility:  Bed Mobility:     Scooting: total assistance and of 2 persons  Supine to Sit: moderate assistance and of 2 persons  Sit to Supine: moderate assistance and of 2 persons  Transfers:     Sit to Stand:  moderate assistance x 2 with rolling walker  Pt required verbal/tactile cueing to maintain L TTWB precautions   Gait:  pt unable to ambulate 2/2 pain       AM-PAC 6 CLICK MOBILITY  Turning over in bed (including adjusting bedclothes, sheets and blankets)?: 2  Sitting down on and standing up from a chair with arms (e.g., wheelchair, bedside commode, etc.): 2  Moving from lying on back to sitting on the side of the bed?: 2  Moving to and from a bed to a chair (including a wheelchair)?: 1  Need to walk in hospital room?: 1  Climbing 3-5 steps with a railing?: 1  Basic Mobility Total Score: 9       Treatment & Education:  Therapist provided instruction and educated of  patient on progress, safety,d/c,PT POC,   proper body mechanics, energy conservation, and fall prevention strategies during tasks listed above, on the effects of prolonged immobility and the importance of performing OOB activity and exercises to promote healing and reduce recovery time   B LE AP,QS,GS and LAQ  Updated white board with appropriate PT mobility information for medical team notification   Donned an extra gown   Questions/concerns addressed within PTA scope of practice; patient   with no further questions. Time was provided for active listening, discussion of health disposition, and discussion of safe discharge. Pt?verbalized?agreement     Patient left HOB elevated with all lines intact, call button in reach, and nsg notified..    GOALS:   Multidisciplinary Problems       Physical Therapy Goals          Problem: Physical Therapy    Goal Priority Disciplines Outcome Goal Variances Interventions   Physical Therapy Goal     PT, PT/OT Ongoing, Progressing     Description: PT goals until 2/30/24    1. Pt supine to sit with minimal assist-not met  2. Pt sit to supine with moderate assist-not met  3. Pt sit to stand with RW with TTWB L LE with moderate assist-not met  4. Pt to perform gait 5ft with RW with TTWB L LE with moderate assist.-not met  5. Pt to transfer bed to/from bedside chair with TTWB L LE with RW with moderate assist.-not met  6. Pt to propel w/c 50ft with B UE on level surface with CGA.-not met  7. Pt to perform B LE exs in sitting or supine x 10 reps to strengthen B LE to improve functional mobility.-not met                        Time Tracking:     PT Received On: 02/17/24  PT Start Time: 0837     PT Stop Time: 0900  PT Total Time (min): 23 min     Billable Minutes: Therapeutic Exercise 8 and Neuromuscular Re-education 15    Treatment Type: Treatment  PT/PTA: PTA     Number of PTA visits since last PT visit: 1 02/17/2024

## 2024-02-17 NOTE — ASSESSMENT & PLAN NOTE
Patient has Abnormal Phosphorus: hypophosphatemia. Will continue to monitor electrolytes closely. Will replace the affected electrolytes and repeat labs to be done after interventions completed. The patient's phosphorus results have been reviewed and are listed below.  Recent Labs   Lab 02/17/24  0550   PHOS 1.9*

## 2024-02-17 NOTE — SUBJECTIVE & OBJECTIVE
"Principal Problem:Status post Girdlestone procedure    Principal Orthopedic Problem: s/p left hip revision antibiotic spacer placement on 2/15    Interval History: NAEON. VSS. L hip pain controlled. Drain output 80cc overnight, Hgb 10.1. NGTD from intraoperative cultures. Stood with maximal assistance, PT recommending moderate intensity therapy.    Review of patient's allergies indicates:  No Known Allergies    Current Facility-Administered Medications   Medication    acetaminophen tablet 1,000 mg    apixaban tablet 2.5 mg    bisacodyL suppository 10 mg    calcium carbonate 200 mg calcium (500 mg) chewable tablet 1,000 mg    cefTRIAXone (ROCEPHIN) 2 g in dextrose 5 % in water (D5W) 100 mL IVPB (MB+)    cyclobenzaprine tablet 10 mg    DAPTOmycin (CUBICIN) 675 mg in sodium chloride 0.9% SolP 50 mL IVPB    docusate sodium capsule 100 mg    ergocalciferol capsule 50,000 Units    EScitalopram oxalate tablet 20 mg    melatonin tablet 6 mg    oxyCODONE immediate release tablet 5 mg    And    oxyCODONE immediate release tablet Tab 10 mg    And    morphine injection 2 mg    nystatin powder    ondansetron injection 4 mg    polyethylene glycol packet 17 g    pregabalin capsule 200 mg    sodium chloride 0.9% flush 10 mL    sodium chloride 0.9% flush 10 mL    vitamin D 1000 units tablet 1,000 Units     Objective:     Vital Signs (Most Recent):  Temp: 98.4 °F (36.9 °C) (02/17/24 0451)  Pulse: 80 (02/17/24 0451)  Resp: 18 (02/17/24 0451)  BP: (!) 111/59 (02/17/24 0451)  SpO2: (!) 92 % (02/17/24 0451) Vital Signs (24h Range):  Temp:  [97.4 °F (36.3 °C)-98.4 °F (36.9 °C)] 98.4 °F (36.9 °C)  Pulse:  [66-81] 80  Resp:  [16-20] 18  SpO2:  [92 %-97 %] 92 %  BP: (105-120)/(52-59) 111/59     Weight: 67.9 kg (149 lb 11.1 oz)  Height: 5' 6" (167.6 cm)  Body mass index is 24.16 kg/m².      Intake/Output Summary (Last 24 hours) at 2/17/2024 0632  Last data filed at 2/17/2024 0622  Gross per 24 hour   Intake --   Output 390 ml   Net -390 ml "          Ortho/SPM Exam     Awake/alert/oriented x3, No acute distress, Afebrile, Vital signs stable  Good inspiratory effort with unlaboured breathing  Dressings c/d/i  NVI in operative limb     Significant Labs: All pertinent labs within the past 24 hours have been reviewed.    Significant Imaging: I have reviewed all pertinent imaging results/findings.

## 2024-02-17 NOTE — PLAN OF CARE
Patient still requires breakthrough pain medication. Wound care to buttocks done. Alteplase to red lumen with effective results. Patient has been refusing SCDs; educated and verbalize understanding but states does not want to wear it because it bothers her.       Problem: Adult Inpatient Plan of Care  Goal: Plan of Care Review  Outcome: Ongoing, Progressing  Goal: Patient-Specific Goal (Individualized)  Outcome: Ongoing, Progressing  Goal: Absence of Hospital-Acquired Illness or Injury  Outcome: Ongoing, Progressing  Goal: Optimal Comfort and Wellbeing  Outcome: Ongoing, Progressing  Goal: Readiness for Transition of Care  Outcome: Ongoing, Progressing     Problem: Infection  Goal: Absence of Infection Signs and Symptoms  Outcome: Ongoing, Progressing     Problem: Adjustment to Injury (Hip Fracture Medical Management)  Goal: Optimal Coping with Change in Health Status  Outcome: Ongoing, Progressing     Problem: Bleeding (Hip Fracture Medical Management)  Goal: Absence of Bleeding  Outcome: Ongoing, Progressing     Problem: Bowel Elimination Impaired (Hip Fracture Medical Management)  Goal: Effective Bowel Elimination  Outcome: Ongoing, Progressing     Problem: Cognitive Decline Risk (Hip Fracture Medical Management)  Goal: Baseline Cognitive Function Maintained  Outcome: Ongoing, Progressing     Problem: Embolism (Hip Fracture Medical Management)  Goal: Absence of Embolism  Outcome: Ongoing, Progressing     Problem: Fracture Stabilization and Management (Hip Fracture Medical Management)  Goal: Fracture Stability  Outcome: Ongoing, Progressing     Problem: Functional Ability Impaired (Hip Fracture Medical Management)  Goal: Optimal Functional Performance  Outcome: Ongoing, Progressing     Problem: Pain (Hip Fracture Medical Management)  Goal: Acceptable Pain Level  Outcome: Ongoing, Progressing     Problem: Urinary Elimination Impaired (Hip Fracture Medical Management)  Goal: Effective Urinary Elimination  Outcome:  Ongoing, Progressing     Problem: Bleeding (Surgery Nonspecified)  Goal: Absence of Bleeding  Outcome: Ongoing, Progressing     Problem: Bowel Motility Impaired (Surgery Nonspecified)  Goal: Effective Bowel Elimination  Outcome: Ongoing, Progressing     Problem: Fluid and Electrolyte Imbalance (Surgery Nonspecified)  Goal: Fluid and Electrolyte Balance  Outcome: Ongoing, Progressing     Problem: Glycemic Control Impaired (Surgery Nonspecified)  Goal: Blood Glucose Level Within Targeted Range  Outcome: Ongoing, Progressing     Problem: Infection (Surgery Nonspecified)  Goal: Absence of Infection Signs and Symptoms  Outcome: Ongoing, Progressing     Problem: Ongoing Anesthesia Effects (Surgery Nonspecified)  Goal: Anesthesia/Sedation Recovery  Outcome: Ongoing, Progressing     Problem: Pain (Surgery Nonspecified)  Goal: Acceptable Pain Control  Outcome: Ongoing, Progressing     Problem: Postoperative Nausea and Vomiting (Surgery Nonspecified)  Goal: Nausea and Vomiting Relief  Outcome: Ongoing, Progressing     Problem: Postoperative Urinary Retention (Surgery Nonspecified)  Goal: Effective Urinary Elimination  Outcome: Ongoing, Progressing     Problem: Respiratory Compromise (Surgery Nonspecified)  Goal: Effective Oxygenation and Ventilation  Outcome: Ongoing, Progressing     Problem: Device-Related Complication Risk (Anesthesia/Analgesia, Neuraxial)  Goal: Safe Infusion Delivery Completion  Outcome: Ongoing, Progressing     Problem: Infection (Anesthesia/Analgesia, Neuraxial)  Goal: Absence of Infection Signs and Symptoms  Outcome: Ongoing, Progressing     Problem: Nausea and Vomiting (Anesthesia/Analgesia, Neuraxial)  Goal: Nausea and Vomiting Relief  Outcome: Ongoing, Progressing     Problem: Pain (Anesthesia/Analgesia, Neuraxial)  Goal: Effective Pain Control  Outcome: Ongoing, Progressing     Problem: Respiratory Compromise (Anesthesia/Analgesia, Neuraxial)  Goal: Effective Oxygenation and Ventilation  Outcome:  Ongoing, Progressing     Problem: Sensorimotor Impairment (Anesthesia/Analgesia, Neuraxial)  Goal: Baseline Motor Function  Outcome: Ongoing, Progressing     Problem: Urinary Retention (Anesthesia/Analgesia, Neuraxial)  Goal: Effective Urinary Elimination  Outcome: Ongoing, Progressing     Problem: Fall Injury Risk  Goal: Absence of Fall and Fall-Related Injury  Outcome: Ongoing, Progressing     Problem: Impaired Wound Healing  Goal: Optimal Wound Healing  Outcome: Ongoing, Progressing

## 2024-02-17 NOTE — ASSESSMENT & PLAN NOTE
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 CT shows acute fracture involving screw at junction of left femoral head and neck components of left hip hemiarthroplasty, with superior displacement of the distal fragment. Ortho consulted and had revision of spacer on 2/15, Revision placement of articulating cement coated antibiotic spacer left hip, Excisional and non excisional debridement and irrigation left hip, skin, subcutaneous tissue fascia and muscle  -TTWB for 6 weeks to LLE with posterior hip precautions per ortho, will need SNF as failed home treatments but fam hesitant per notes, will discuss further with them. Patient discussed 2/17 and did not seem that averse  -pain control with multimodals  -hg stable at 10--8.9 post op now, bowel regimen  -CX NG so far, will need 6 weeks antibiotics restarted per ID, on dapto now  -drain in place post op

## 2024-02-17 NOTE — PROGRESS NOTES
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 Patient refused SNF placement at the time of discharge. I saw her in clinic and there was concern regarding OPAT adherence.     She is now [place] to [facility] [place] a fall with direct impact over her left hip resulting in the displacement of her prosthesis, afebrile, hemodynamically stable, blood cultures with no growth, and without leukocytes. S/p operative intervention with ortho- I&D and revision placement of articulating cement coated antibiotic spacer left hip, operative cultures with no growth from 2/15 PM. Ucx with GNR, had some urinary issues. Remains without fever and no leukocytosis.     Recommendation    Daptomycin 8 mg/kg IV q 24 hours - for 6 weeks  Ceftriaxone 2 grams IV q 24 hours - for 3 days  See OPAT note for full details. Not an appropriate home OPAT candidate, agree with facility for discharge.   Discussed plan of care with the patient and primary team          Anticipated Disposition: TBD    Thank you for your consult. I will sign off. Please contact us if you have any additional questions.    Haroldo Morfin MD  Infectious Disease  [facility]    Subjective:     Principal Problem:Status post Girdlestone procedure    HPI: 78 year ofd female with hx of  cephalomedullary nail fixation left proximal femur who was [place] [facility] [place]-  s/p I&D and antibiotic spacer placement on 1/19/24 and repeat I&D on 1/25, aspiration cultures with MRSA, hospital course notable for MRSA bacteremia which she cleared quickly, TTE neg, on IV daptomycin for six weeks with OPAT. Patient refused SNF placement at the time of discharge. I saw her in clinic and there was concern regarding OPAT adherence.     The patient states that she was using her walker, and then suddenly felt weak and fell with direct impact over her left hip prosthesis. She denies fevers, rigors, chills, purulence from surgical site incision. Imaging showed a fracture involving prosthesis of her left hip joint, she was seen by orthopedic surgery, and she subsequently was taken to the OR today for revision surgery, which involved some irrigation and debridement, operative cultures are incubating.     She is now admitted to [facility] [place] a [place] with direct impact over her left hip resulting in a fracture of her prosthesis, afebrile, hemodynamically stable, blood cultures with no growth, and without leukocytes. S/p operative intervention with ortho- I&D and revision placement of articulating cement coated antibiotic spacer left hip, operative cultures incubating.       Interval History: MARYLOU, passing gas and worried about having a proper BM today, no new fevers, tenderness over L hip, bandage is c/d/i    Review of Systems  Constitutional:  Positive for activity change, appetite change and fatigue. Negative for chills and fever.   HENT:  Negative for trouble swallowing.    Respiratory:  Negative for cough and shortness of breath.    Gastrointestinal:  Negative for abdominal distention, blood in stool, diarrhea and vomiting.   Genitourinary:  Negative for dysuria and hematuria.   Musculoskeletal:  Positive for arthralgias and joint swelling. Negative for back pain.   Skin:  Positive for wound.   Objective:      Vital Signs (Most Recent):  Temp: 97.8 °F (36.6 °C) (02/16/24 1537)  Pulse: 71 (02/16/24 1537)  Resp: 18 (02/16/24 1650)  BP: (!) 105/52 (02/16/24 1537)  SpO2: 97 % (02/16/24 1537) Vital Signs (24h Range):  Temp:  [97.4 °F (36.3 °C)-98.5 °F (36.9 °C)] 97.8 °F (36.6 °C)  Pulse:  [61-83] 71  Resp:  [11-20] 18  SpO2:  [95 %-100 %] 97 %  BP: ()/(52-82) 105/52     Weight: 67.9 kg (149 lb 11.1 oz)  Body mass index is 24.16 kg/m².    Estimated Creatinine Clearance: 64 mL/min (based on SCr of 0.7 mg/dL).     Physical Exam   Constitutional:       Appearance: She is ill-appearing. She is not toxic-appearing.   HENT:      Head: Normocephalic and atraumatic.      Nose: Nose normal.      Mouth/Throat:      Mouth: Mucous membranes are moist.      Pharynx: Oropharynx is clear.   Eyes:      Conjunctiva/sclera: Conjunctivae normal.   Cardiovascular:      Rate and Rhythm: Normal rate and regular rhythm.      Pulses: Normal pulses.      Heart sounds: Normal heart sounds.   Pulmonary:      Effort: Pulmonary effort is normal.      Breath sounds: Normal breath sounds.   Abdominal:      General: Bowel sounds are normal.      Palpations: Abdomen is soft.      Tenderness: There is no guarding or rebound.   Musculoskeletal:         General: Swelling, tenderness and deformity present.      Cervical back: Neck supple.      Comments: Left hip edema, tenderness on palpation, dry bandage over prior incision site  Severely limited ROM   Skin:     General: Skin is warm and dry.      Comments: UE picc c/d/I non tender   Neurological:      Mental Status: She is alert and oriented to person, place, and time. Mental status is at baseline.   Significant Labs:   Microbiology Results (last 7 days)       Procedure Component Value Units Date/Time    AFB Culture & Smear [[id number]] Collected: 02/15/24 5199    Order Status: Completed Specimen: Incision site from Hip, Left Updated: 02/16/24 1332     AFB CULTURE STAIN No acid fast bacilli seen.     Culture, Anaerobe [[id number]] Collected: 02/15/24 1919    Order Status: Completed Specimen: Incision site from Hip, Left Updated: 02/16/24 0954     Anaerobic Culture Culture in progress    Aerobic culture [[id number]] Collected: 02/15/24 1919    Order Status: Completed Specimen: Incision site from Hip, Left Updated: 02/16/24 0726     Aerobic Bacterial Culture No growth    Blood culture [[id number]] Collected: 02/14/24 2241    Order Status: Completed Specimen: Blood Updated: 02/16/24 0612     Blood Culture, Routine No Growth to date      No Growth to date    Blood culture [[id number]] Collected: 02/14/24 2241    Order Status: Completed Specimen: Blood Updated: 02/16/24 0612     Blood Culture, Routine No Growth to date      No Growth to date    Urine culture [[id number]]  (Abnormal) Collected: 02/14/24 2232    Order Status: Completed Specimen: Urine Updated: 02/16/24 0006     Urine Culture, Routine GRAM NEGATIVE SANTOS  >100,000 cfu/ml  Identification and susceptibility pending      Narrative:      Specimen Source->Urine    Gram stain [[id number]] Collected: 02/15/24 1919    Order Status: Completed Specimen: Incision site from Hip, Left Updated: 02/15/24 2115     Gram Stain Result Many WBC's      No organisms seen    Fungus culture [[id number]] Collected: 02/15/24 1919    Order Status: Sent Specimen: Incision site from Hip, Left Updated: 02/15/24 1929          All pertinent labs within the past 24 hours have been reviewed.    Significant Imaging: I have reviewed all pertinent imaging results/findings within the past 24 hours.

## 2024-02-17 NOTE — SUBJECTIVE & OBJECTIVE
Interval History: MARYLOU, passing gas and worried about having a proper BM today, no new fevers, tenderness over L hip, bandage is c/d/i    Review of Systems  Constitutional:  Positive for activity change, appetite change and fatigue. Negative for chills and fever.   HENT:  Negative for trouble swallowing.    Respiratory:  Negative for cough and shortness of breath.    Gastrointestinal:  Negative for abdominal distention, blood in stool, diarrhea and vomiting.   Genitourinary:  Negative for dysuria and hematuria.   Musculoskeletal:  Positive for arthralgias and joint swelling. Negative for back pain.   Skin:  Positive for wound.   Objective:     Vital Signs (Most Recent):  Temp: 97.8 °F (36.6 °C) (02/16/24 1537)  Pulse: 71 (02/16/24 1537)  Resp: 18 (02/16/24 1650)  BP: (!) 105/52 (02/16/24 1537)  SpO2: 97 % (02/16/24 1537) Vital Signs (24h Range):  Temp:  [97.4 °F (36.3 °C)-98.5 °F (36.9 °C)] 97.8 °F (36.6 °C)  Pulse:  [61-83] 71  Resp:  [11-20] 18  SpO2:  [95 %-100 %] 97 %  BP: ()/(52-82) 105/52     Weight: 67.9 kg (149 lb 11.1 oz)  Body mass index is 24.16 kg/m².    Estimated Creatinine Clearance: 64 mL/min (based on SCr of 0.7 mg/dL).     Physical Exam   Constitutional:       Appearance: She is ill-appearing. She is not toxic-appearing.   HENT:      Head: Normocephalic and atraumatic.      Nose: Nose normal.      Mouth/Throat:      Mouth: Mucous membranes are moist.      Pharynx: Oropharynx is clear.   Eyes:      Conjunctiva/sclera: Conjunctivae normal.   Cardiovascular:      Rate and Rhythm: Normal rate and regular rhythm.      Pulses: Normal pulses.      Heart sounds: Normal heart sounds.   Pulmonary:      Effort: Pulmonary effort is normal.      Breath sounds: Normal breath sounds.   Abdominal:      General: Bowel sounds are normal.      Palpations: Abdomen is soft.      Tenderness: There is no guarding or rebound.   Musculoskeletal:         General: Swelling, tenderness and deformity present.      Cervical  back: Neck supple.      Comments: Left hip edema, tenderness on palpation, dry bandage over prior incision site  Severely limited ROM   Skin:     General: Skin is warm and dry.      Comments: UE picc c/d/I non tender   Neurological:      Mental Status: She is alert and oriented to person, place, and time. Mental status is at baseline.   Significant Labs:   Microbiology Results (last 7 days)       Procedure Component Value Units Date/Time    AFB Culture & Smear [[id number]] Collected: 02/15/24 1919    Order Status: Completed Specimen: Incision site from Hip, Left Updated: 02/16/24 1332     AFB CULTURE STAIN No acid fast bacilli seen.    Culture, Anaerobe [[id number]] Collected: 02/15/24 1919    Order Status: Completed Specimen: Incision site from Hip, Left Updated: 02/16/24 0954     Anaerobic Culture Culture in progress    Aerobic culture [[id number]] Collected: 02/15/24 1919    Order Status: Completed Specimen: Incision site from Hip, Left Updated: 02/16/24 0726     Aerobic Bacterial Culture No growth    Blood culture [[id number]] Collected: 02/14/24 2241    Order Status: Completed Specimen: Blood Updated: 02/16/24 0612     Blood Culture, Routine No Growth to date      No Growth to date    Blood culture [[id number]] Collected: 02/14/24 2241    Order Status: Completed Specimen: Blood Updated: 02/16/24 0612     Blood Culture, Routine No Growth to date      No Growth to date    Urine culture [[id number]]  (Abnormal) Collected: 02/14/24 2232    Order Status: Completed Specimen: Urine Updated: 02/16/24 0006     Urine Culture, Routine GRAM NEGATIVE SANTOS  >100,000 cfu/ml  Identification and susceptibility pending      Narrative:      Specimen Source->Urine    Gram stain [[id number]] Collected: 02/15/24 1919    Order Status: Completed Specimen: Incision site from Hip, Left Updated: 02/15/24 2115     Gram Stain Result Many WBC's      No organisms seen    Fungus culture [[id number]] Collected: 02/15/24 1919    Order Status:  Sent Specimen: Incision site from Hip, Left Updated: 02/15/24 1929          All pertinent labs within the past 24 hours have been reviewed.    Significant Imaging: I have reviewed all pertinent imaging results/findings within the past 24 hours.

## 2024-02-17 NOTE — ASSESSMENT & PLAN NOTE
78 year old female with hx of cephalomedullary nail fixation left proximal femur who was admitted recently to [facility] [place] for septic joint-  s/p I&D and antibiotic spacer placement on 1/19/24 and repeat I&D on 1/25, aspiration cultures with MRSA, hospital course notable for MRSA bacteremia which she cleared quickly, TTE neg, on IV daptomycin for six weeks with OPAT. Patient refused SNF placement at the time of discharge. I saw her in clinic and there was concern regarding OPAT adherence.     She is now admitted to [facility] [place] after a fall with direct impact over her left hip resulting in the displacement of her prosthesis, afebrile, hemodynamically stable, blood cultures with no growth, and without leukocytes. S/p operative intervention with ortho- I&D and revision placement of articulating cement coated antibiotic spacer left hip, operative cultures with no growth from 2/15 PM. Ucx with GNR, had some urinary issues. Remains without fever and no leukocytosis.     Recommendation    Daptomycin 8 mg/kg IV q 24 hours - for 6 weeks  Ceftriaxone 2 grams IV q 24 hours - for 3 days  See OPAT note for full details. Not an appropriate home OPAT candidate, agree with facility for discharge.

## 2024-02-17 NOTE — ASSESSMENT & PLAN NOTE
Recent MRSA arthritis during recent admission, planned for Daptomycin daily until 2/29 per ID recs. Will continue this now per ID recs, likely will need to restart timeline for 6 weeks per ID notes and rec end date of 3/28 with weekly labs. They rec going to SNf for antibiotics and will discuss with patient family further as they prefer HH per cm notes Picc in place to RUE

## 2024-02-18 LAB
ALBUMIN SERPL BCP-MCNC: 1.3 G/DL (ref 3.5–5.2)
ALP SERPL-CCNC: 118 U/L (ref 55–135)
ALT SERPL W/O P-5'-P-CCNC: 6 U/L (ref 10–44)
ANION GAP SERPL CALC-SCNC: 5 MMOL/L (ref 8–16)
AST SERPL-CCNC: 18 U/L (ref 10–40)
BASOPHILS # BLD AUTO: 0.06 K/UL (ref 0–0.2)
BASOPHILS NFR BLD: 0.6 % (ref 0–1.9)
BILIRUB SERPL-MCNC: 0.3 MG/DL (ref 0.1–1)
BUN SERPL-MCNC: 11 MG/DL (ref 8–23)
CALCIUM SERPL-MCNC: 7.7 MG/DL (ref 8.7–10.5)
CHLORIDE SERPL-SCNC: 106 MMOL/L (ref 95–110)
CO2 SERPL-SCNC: 25 MMOL/L (ref 23–29)
CREAT SERPL-MCNC: 0.7 MG/DL (ref 0.5–1.4)
CRP SERPL-MCNC: 80.1 MG/L (ref 0–8.2)
DIFFERENTIAL METHOD BLD: ABNORMAL
EOSINOPHIL # BLD AUTO: 0.8 K/UL (ref 0–0.5)
EOSINOPHIL NFR BLD: 7.6 % (ref 0–8)
ERYTHROCYTE [DISTWIDTH] IN BLOOD BY AUTOMATED COUNT: 17.1 % (ref 11.5–14.5)
EST. GFR  (NO RACE VARIABLE): >60 ML/MIN/1.73 M^2
GLUCOSE SERPL-MCNC: 80 MG/DL (ref 70–110)
HCT VFR BLD AUTO: 28.2 % (ref 37–48.5)
HGB BLD-MCNC: 9.1 G/DL (ref 12–16)
IMM GRANULOCYTES # BLD AUTO: 0.06 K/UL (ref 0–0.04)
IMM GRANULOCYTES NFR BLD AUTO: 0.6 % (ref 0–0.5)
LYMPHOCYTES # BLD AUTO: 1.1 K/UL (ref 1–4.8)
LYMPHOCYTES NFR BLD: 10.7 % (ref 18–48)
MAGNESIUM SERPL-MCNC: 1.6 MG/DL (ref 1.6–2.6)
MCH RBC QN AUTO: 29.5 PG (ref 27–31)
MCHC RBC AUTO-ENTMCNC: 32.3 G/DL (ref 32–36)
MCV RBC AUTO: 92 FL (ref 82–98)
MONOCYTES # BLD AUTO: 0.8 K/UL (ref 0.3–1)
MONOCYTES NFR BLD: 7.8 % (ref 4–15)
NEUTROPHILS # BLD AUTO: 7.2 K/UL (ref 1.8–7.7)
NEUTROPHILS NFR BLD: 72.7 % (ref 38–73)
NRBC BLD-RTO: 0 /100 WBC
PHOSPHATE SERPL-MCNC: 2.5 MG/DL (ref 2.7–4.5)
PLATELET # BLD AUTO: 380 K/UL (ref 150–450)
PMV BLD AUTO: 9.1 FL (ref 9.2–12.9)
POTASSIUM SERPL-SCNC: 3.8 MMOL/L (ref 3.5–5.1)
PROT SERPL-MCNC: 5.1 G/DL (ref 6–8.4)
RBC # BLD AUTO: 3.08 M/UL (ref 4–5.4)
SODIUM SERPL-SCNC: 136 MMOL/L (ref 136–145)
WBC # BLD AUTO: 9.89 K/UL (ref 3.9–12.7)

## 2024-02-18 PROCEDURE — 83735 ASSAY OF MAGNESIUM: CPT | Performed by: HOSPITALIST

## 2024-02-18 PROCEDURE — 25000003 PHARM REV CODE 250

## 2024-02-18 PROCEDURE — 85025 COMPLETE CBC W/AUTO DIFF WBC: CPT

## 2024-02-18 PROCEDURE — 84100 ASSAY OF PHOSPHORUS: CPT | Performed by: HOSPITALIST

## 2024-02-18 PROCEDURE — 97116 GAIT TRAINING THERAPY: CPT

## 2024-02-18 PROCEDURE — 25000003 PHARM REV CODE 250: Performed by: HOSPITALIST

## 2024-02-18 PROCEDURE — 94761 N-INVAS EAR/PLS OXIMETRY MLT: CPT

## 2024-02-18 PROCEDURE — 86140 C-REACTIVE PROTEIN: CPT

## 2024-02-18 PROCEDURE — 25000003 PHARM REV CODE 250: Performed by: STUDENT IN AN ORGANIZED HEALTH CARE EDUCATION/TRAINING PROGRAM

## 2024-02-18 PROCEDURE — 80053 COMPREHEN METABOLIC PANEL: CPT

## 2024-02-18 PROCEDURE — 97530 THERAPEUTIC ACTIVITIES: CPT

## 2024-02-18 PROCEDURE — 63600175 PHARM REV CODE 636 W HCPCS

## 2024-02-18 PROCEDURE — 11000001 HC ACUTE MED/SURG PRIVATE ROOM

## 2024-02-18 PROCEDURE — 63600175 PHARM REV CODE 636 W HCPCS: Mod: JZ,JG | Performed by: INTERNAL MEDICINE

## 2024-02-18 PROCEDURE — 63600175 PHARM REV CODE 636 W HCPCS: Performed by: STUDENT IN AN ORGANIZED HEALTH CARE EDUCATION/TRAINING PROGRAM

## 2024-02-18 RX ORDER — POLYETHYLENE GLYCOL 3350 17 G/17G
17 POWDER, FOR SOLUTION ORAL DAILY
Refills: 0
Start: 2024-02-19 | End: 2024-02-19

## 2024-02-18 RX ORDER — TALC
6 POWDER (GRAM) TOPICAL NIGHTLY PRN
Refills: 0
Start: 2024-02-18 | End: 2024-02-19 | Stop reason: HOSPADM

## 2024-02-18 RX ORDER — METOPROLOL TARTRATE 50 MG/1
50 TABLET ORAL 2 TIMES DAILY
Status: DISCONTINUED | OUTPATIENT
Start: 2024-02-18 | End: 2024-02-20 | Stop reason: HOSPADM

## 2024-02-18 RX ORDER — ACETAMINOPHEN 325 MG/1
650 TABLET ORAL EVERY 8 HOURS PRN
Refills: 0
Start: 2024-02-18

## 2024-02-18 RX ORDER — PREGABALIN 200 MG/1
200 CAPSULE ORAL 3 TIMES DAILY
Qty: 30 CAPSULE | Refills: 6 | Status: SHIPPED | OUTPATIENT
Start: 2024-02-18 | End: 2024-02-19

## 2024-02-18 RX ORDER — ERGOCALCIFEROL 1.25 MG/1
50000 CAPSULE ORAL
Start: 2024-02-22 | End: 2024-02-19

## 2024-02-18 RX ORDER — CALCIUM CARBONATE 200(500)MG
1000 TABLET,CHEWABLE ORAL DAILY
Qty: 60 TABLET | Refills: 11
Start: 2024-02-19 | End: 2024-02-19

## 2024-02-18 RX ORDER — OXYCODONE HYDROCHLORIDE 5 MG/1
5 TABLET ORAL EVERY 4 HOURS PRN
Qty: 10 TABLET | Refills: 0 | Status: SHIPPED | OUTPATIENT
Start: 2024-02-18 | End: 2024-02-19 | Stop reason: HOSPADM

## 2024-02-18 RX ORDER — METOPROLOL TARTRATE 25 MG/1
25 TABLET, FILM COATED ORAL 2 TIMES DAILY
Status: DISCONTINUED | OUTPATIENT
Start: 2024-02-18 | End: 2024-02-18

## 2024-02-18 RX ORDER — OXYCODONE HYDROCHLORIDE 10 MG/1
10 TABLET ORAL EVERY 4 HOURS PRN
Qty: 10 TABLET | Refills: 0 | Status: SHIPPED | OUTPATIENT
Start: 2024-02-18 | End: 2024-02-19

## 2024-02-18 RX ORDER — AMLODIPINE BESYLATE 5 MG/1
5 TABLET ORAL DAILY
Status: DISCONTINUED | OUTPATIENT
Start: 2024-02-18 | End: 2024-02-20 | Stop reason: HOSPADM

## 2024-02-18 RX ADMIN — PREGABALIN 200 MG: 150 CAPSULE ORAL at 09:02

## 2024-02-18 RX ADMIN — DAPTOMYCIN 675 MG: 350 INJECTION, POWDER, LYOPHILIZED, FOR SOLUTION INTRAVENOUS at 01:02

## 2024-02-18 RX ADMIN — POTASSIUM & SODIUM PHOSPHATES POWDER PACK 280-160-250 MG 2 PACKET: 280-160-250 PACK at 04:02

## 2024-02-18 RX ADMIN — METOPROLOL TARTRATE 50 MG: 50 TABLET, FILM COATED ORAL at 09:02

## 2024-02-18 RX ADMIN — DOCUSATE SODIUM 100 MG: 100 CAPSULE, LIQUID FILLED ORAL at 08:02

## 2024-02-18 RX ADMIN — APIXABAN 5 MG: 5 TABLET, FILM COATED ORAL at 09:02

## 2024-02-18 RX ADMIN — POTASSIUM & SODIUM PHOSPHATES POWDER PACK 280-160-250 MG 2 PACKET: 280-160-250 PACK at 11:02

## 2024-02-18 RX ADMIN — PREGABALIN 200 MG: 150 CAPSULE ORAL at 08:02

## 2024-02-18 RX ADMIN — NYSTATIN: 100000 POWDER TOPICAL at 01:02

## 2024-02-18 RX ADMIN — MORPHINE SULFATE 2 MG: 2 INJECTION, SOLUTION INTRAMUSCULAR; INTRAVENOUS at 11:02

## 2024-02-18 RX ADMIN — PREGABALIN 200 MG: 150 CAPSULE ORAL at 04:02

## 2024-02-18 RX ADMIN — POTASSIUM & SODIUM PHOSPHATES POWDER PACK 280-160-250 MG 2 PACKET: 280-160-250 PACK at 05:02

## 2024-02-18 RX ADMIN — ALTEPLASE 2 MG: 2.2 INJECTION, POWDER, LYOPHILIZED, FOR SOLUTION INTRAVENOUS at 01:02

## 2024-02-18 RX ADMIN — POTASSIUM & SODIUM PHOSPHATES POWDER PACK 280-160-250 MG 2 PACKET: 280-160-250 PACK at 09:02

## 2024-02-18 RX ADMIN — POLYETHYLENE GLYCOL 3350 17 G: 17 POWDER, FOR SOLUTION ORAL at 08:02

## 2024-02-18 RX ADMIN — CHOLECALCIFEROL TAB 25 MCG (1000 UNIT) 1000 UNITS: 25 TAB at 08:02

## 2024-02-18 RX ADMIN — OXYCODONE HYDROCHLORIDE 10 MG: 10 TABLET ORAL at 08:02

## 2024-02-18 RX ADMIN — DOCUSATE SODIUM 100 MG: 100 CAPSULE, LIQUID FILLED ORAL at 09:02

## 2024-02-18 RX ADMIN — OXYCODONE HYDROCHLORIDE 10 MG: 10 TABLET ORAL at 07:02

## 2024-02-18 RX ADMIN — MORPHINE SULFATE 2 MG: 2 INJECTION, SOLUTION INTRAMUSCULAR; INTRAVENOUS at 08:02

## 2024-02-18 RX ADMIN — CALCIUM CARBONATE (ANTACID) CHEW TAB 500 MG 1000 MG: 500 CHEW TAB at 08:02

## 2024-02-18 RX ADMIN — METOPROLOL TARTRATE 25 MG: 25 TABLET, FILM COATED ORAL at 08:02

## 2024-02-18 RX ADMIN — CEFTRIAXONE SODIUM 2 G: 2 INJECTION, POWDER, FOR SOLUTION INTRAMUSCULAR; INTRAVENOUS at 05:02

## 2024-02-18 RX ADMIN — NYSTATIN: 100000 POWDER TOPICAL at 10:02

## 2024-02-18 RX ADMIN — NYSTATIN: 100000 POWDER TOPICAL at 03:02

## 2024-02-18 RX ADMIN — ESCITALOPRAM OXALATE 20 MG: 20 TABLET ORAL at 09:02

## 2024-02-18 RX ADMIN — APIXABAN 2.5 MG: 2.5 TABLET, FILM COATED ORAL at 08:02

## 2024-02-18 RX ADMIN — AMLODIPINE BESYLATE 5 MG: 5 TABLET ORAL at 08:02

## 2024-02-18 NOTE — SUBJECTIVE & OBJECTIVE
"Principal Problem:Status post Girdlestone procedure    Principal Orthopedic Problem: s/p left hip revision antibiotic spacer placement on 2/15    Interval History: NAEON. VSS. L hip pain controlled. Drain output 30cc from [id number], Hgb 9.1. NGTD from intraoperative cultures. Performed sit to stand x 2 with moderate assistance. Patient unable to ambulate due to pain on two attempts, PT recommending moderate intensity therapy.    Review of patient's allergies indicates:  No Known Allergies    Current Facility-Administered Medications   Medication    amLODIPine tablet 5 mg    apixaban tablet 2.5 mg    bisacodyL suppository 10 mg    calcium carbonate 200 mg calcium (500 mg) chewable tablet 1,000 mg    cefTRIAXone (ROCEPHIN) 2 g in dextrose 5 % in water (D5W) 100 mL IVPB (MB+)    cyclobenzaprine tablet 10 mg    DAPTOmycin (CUBICIN) 675 mg in sodium chloride 0.9% SolP 50 mL IVPB    docusate sodium capsule 100 mg    ergocalciferol capsule 50,000 Units    EScitalopram oxalate tablet 20 mg    melatonin tablet 6 mg    metoprolol tartrate (LOPRESSOR) tablet 25 mg    oxyCODONE immediate release tablet 5 mg    And    oxyCODONE immediate release tablet Tab 10 mg    And    morphine injection 2 mg    nystatin powder    ondansetron injection 4 mg    polyethylene glycol packet 17 g    potassium, sodium phosphates 280-160-250 mg packet 2 packet    pregabalin capsule 200 mg    sodium chloride 0.9% flush 10 mL    sodium chloride 0.9% flush 10 mL    vitamin D 1000 units tablet 1,000 Units     Objective:     Vital Signs (Most Recent):  Temp: 98.7 °F (37.1 °C) (02/18/24 0710)  Pulse: 98 (02/18/24 0710)  Resp: 17 (02/18/24 0850)  BP: (!) 150/76 (02/18/24 0710)  SpO2: 96 % (02/18/24 0832) Vital Signs (24h Range):  Temp:  [97.9 °F (36.6 °C)-98.7 °F (37.1 °C)] 98.7 °F (37.1 °C)  Pulse:  [84-98] 98  Resp:  [16-18] 17  SpO2:  [96 %-97 %] 96 %  BP: (122-185)/(61-80) 150/76     Weight: 67.9 kg (149 lb 11.1 oz)  Height: 5' 6" (167.6 cm)  Body mass " index is 24.16 kg/m².      Intake/Output Summary (Last 24 hours) at 2/18/2024 0911  Last data filed at 2/18/2024 0858  Gross per 24 hour   Intake --   Output 240 ml   Net -240 ml          Ortho/SPM Exam     Awake/alert/oriented x3, No acute distress, Afebrile, Vital signs stable  Good inspiratory effort with unlaboured breathing  Drain in place  Dressings c/d/i  NVI in operative limb     Significant Labs: All pertinent labs within the past 24 hours have been reviewed.    Significant Imaging: I have reviewed all pertinent imaging results/findings.

## 2024-02-18 NOTE — ASSESSMENT & PLAN NOTE
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 CT shows acute fracture involving screw at junction of left femoral head and neck components of left hip hemiarthroplasty, with superior displacement of the distal fragment. Ortho consulted and had revision of spacer on 2/15, Revision placement of articulating cement coated antibiotic spacer left hip, Excisional and non excisional debridement and irrigation left hip, skin, subcutaneous tissue fascia and muscle  -TTWB for 6 weeks to LLE with posterior hip precautions per ortho, will need SNF as failed home treatments but fam hesitant per notes, will discuss further with them.  She reports is amenable but states  wants her home. Discussed more on 2/18 will f/u with CM and family tomorrow more  -pain control with multimodals  -hg stable at 10--8.9 post op now, bowel regimen  -CX NG so far, will need 6 weeks antibiotics restarted per ID, on dapto now  -drain in place post op

## 2024-02-18 NOTE — PLAN OF CARE
Problem: Adult Inpatient Plan of Care  Goal: Plan of Care Review  Outcome: Ongoing, Progressing  Goal: Patient-Specific Goal (Individualized)  Outcome: Ongoing, Progressing  Goal: Absence of Hospital-Acquired Illness or Injury  Outcome: Ongoing, Progressing  Intervention: Identify and Manage Fall Risk  Flowsheets (Taken 2/18/2024 0704)  Safety Promotion/Fall Prevention:   assistive device/personal item within reach   instructed to call staff for mobility   side rails raised x 2   nonskid shoes/socks when out of bed  Intervention: Prevent Skin Injury  Flowsheets (Taken 2/18/2024 0704)  Body Position: weight shifting  Skin Protection: adhesive use limited  Intervention: Prevent and Manage VTE (Venous Thromboembolism) Risk  Flowsheets (Taken 2/18/2024 0704)  Activity Management: Rolling - L1  VTE Prevention/Management:   remove, assess skin, and reapply sequential compression device   ambulation promoted   fluids promoted     Problem: Infection  Goal: Absence of Infection Signs and Symptoms  Outcome: Ongoing, Progressing

## 2024-02-18 NOTE — PT/OT/SLP PROGRESS
Physical Therapy Co-Treatment    OT present for cotreat due to pt's multiple medical comorbidities and functional/cognition deficits requiring two skilled therapists to appropriately progress pt's musculoskeletal strength, neuromuscular control, and endurance while taking into consideration medical acuity and pt safety.    Patient Name:  Froilan Ray   MRN:  [id number]    Recommendations:     Discharge Recommendations: Moderate Intensity Therapy  Discharge Equipment Recommendations: none  Barriers to discharge: None    Assessment:     Froilan Ray is a 78 y.o. female admitted with a medical diagnosis of Status post Girdlestone procedure.  She presents with the following impairments/functional limitations: weakness, pain, impaired functional mobility, impaired self care skills, impaired balance, gait instability, decreased lower extremity function, orthopedic precautions     Pt receptive and tolerated PT co-treatment with OT fairly well. Pt able to take 2 side steps at EOB with RW and max A of 2 people but amb stopped afterward due to decreased adherence to WB precautions due to pain and fatigue. Patient continues to demonstrate the need for moderate intensity therapy on a daily basis post acute exhibited by decreased independence with self-care and functional mobility  .    Rehab Prognosis: Good; patient would benefit from acute skilled PT services to address these deficits and reach maximum level of function.    Recent Surgery: Procedure(s) (LRB):  Revision hip antibiotic spacer head exchange, left, lateral, peg board, depuy osteomed in room, vanc, ancef, txa, (Left)  IRRIGATION AND DEBRIDEMENT, LOWER EXTREMITY (Left)  FLAP GRAFT, LOCAL (Left) 3 Days Post-Op    Plan:     During this hospitalization, patient to be seen 4 x/week to address the identified rehab impairments via gait training, therapeutic activities, therapeutic exercises, neuromuscular re-education and progress toward the following  "goals:    Plan of Care Expires:  03/17/24    Subjective     Chief Complaint: L hip pain with movement  Patient/Family Comments/goals: "I just feels naggy"  Pain/Comfort:  Pain Rating 1:  ("naggy")  Location - Side 1: Left  Location - Orientation 1: generalized  Location 1: hip  Pain Addressed 1: Reposition, Distraction, Cessation of Activity, Nurse notified  Pain Rating Post-Intervention 1: 8/10      Objective:     Communicated with RN prior to session.  Patient found HOB elevated with FCD, BEULAH drain, bed alarm upon PT entry to room.     General Precautions: Standard, fall  Orthopedic Precautions: LLE toe touch weight bearing, LLE posterior precautions  Braces: N/A  Respiratory Status: Room air     Functional Mobility:    Bed Mobility:   Rolling: to R with moderate assistance  Supine > Sit: moderate assistance and of 2 persons  Sit > Supine: moderate assistance and of 2 persons    Transfers:   Sit <> Stand Transfer: moderate assistance and of 2 persons from EOB using rolling walker   Verbal and manual cues for R hand placement on bed  PT assisted with keeping LLE pushed forward to decreased WB when standing    Balance:   Sitting balance: FAIR+: Maintains balance through MINIMAL excursions of active trunk motion  Standing balance:   POOR: Needs MODERATE assist to maintain                 Gait:  Distance: 2 side steps at EOB to the R   Assistive Device: RW  Assistance Level: maximal assistance and of 2 persons  Gait Assessment: Pt took 2 small side steps at EOB needing verbal and manual cues for sequence, RW management, and increased BUE WB on walker  PT assisted with keeping foot under pts L heel to assist with maintenance of WB precautions, pt demonstrated increased WB on 2nd step and gait trial stopped.      AM-PAC 6 CLICK MOBILITY  Turning over in bed (including adjusting bedclothes, sheets and blankets)?: 2  Sitting down on and standing up from a chair with arms (e.g., wheelchair, bedside commode, etc.): 2  Moving " from lying on back to sitting on the side of the bed?: 2  Moving to and from a bed to a chair (including a wheelchair)?: 1  Need to walk in hospital room?: 1  Climbing 3-5 steps with a railing?: 1  Basic Mobility Total Score: 9       Treatment & Education:  Gait training performed with pt needing max A of 2 persons giving verbal and manual cues for sequence, RW management, and maintaining WB precautions.  Pt educated on tip to reduce fall risk and safety with mobility and using call button for assistance from nursing staff with bed mobility.  All questions answered within the scope of PT.  White board updated accordingly.      Patient left HOB elevated with all lines intact, call button in reach, bed alarm on, and RN notified..    GOALS:   Multidisciplinary Problems       Physical Therapy Goals          Problem: Physical Therapy    Goal Priority Disciplines Outcome Goal Variances Interventions   Physical Therapy Goal     PT, PT/OT Ongoing, Progressing     Description: PT goals until 2/30/24    1. Pt supine to sit with minimal assist-not met  2. Pt sit to supine with moderate assist-not met  3. Pt sit to stand with RW with TTWB L LE with moderate assist-not met  4. Pt to perform gait 5ft with RW with TTWB L LE with moderate assist.-not met  5. Pt to transfer bed to/from bedside chair with TTWB L LE with RW with moderate assist.-not met  6. Pt to propel w/c 50ft with B UE on level surface with CGA.-not met  7. Pt to perform B LE exs in sitting or supine x 10 reps to strengthen B LE to improve functional mobility.-not met                        Time Tracking:     PT Received On: 02/18/24  PT Start Time: 0827     PT Stop Time: 0846  PT Total Time (min): 19 min     Billable Minutes: Gait Training 19    Treatment Type: Treatment  PT/PTA: PT     Number of PTA visits since last PT visit: 0     02/18/2024

## 2024-02-18 NOTE — PROGRESS NOTES
"[facility] Medicine  Progress Note    Patient Name: Froilan Ray  MRN: [id number]  Patient Class: IP- Inpatient   Admission Date: 2/14/2024  Length of Stay: 4 days  Attending Physician: Mayte Rogers MD  Primary Care Provider: Alphonse Boothe III, MD        Subjective:     Principal Problem:Status post Girdlestone procedure        HPI:  Froilan Ray is a 78 y.o. female with history of L hip surgery, HTN, HLD, CHF, chronic pain on opioids, peripheral neuropathy, and recent admission for MRSA septic arthritis requiring replacement of L hip hardware who was transferred from [facility] for L prosthetic hip fracture.     Patient reports that she was doing relatively well on the morning of presentation, able to ambulate with her walker without major issues. Unfortunately, while walking, her L leg "slipped," causing her to fall to the ground. She immediately noted L hip pain and inability to ambulate. She denies any pre-syncopal symptoms.She presented to the ED, where L hip fx was noted, therefore transferred here for ortho evaluation.     Overview/Hospital Course:  No notes on file    Interval history- she was in good spirits this morning, BM this morning. She said that pain at times with movement and therapy and has multimodals for pain now including oxy 5/10 and muscle relaxers for pain control. Hg stable on review at 9.1 and crp improved from previous at 80. She reports she knows it will take some time for pain to improve given nature of injury. Discussed snf with her, she said her  takes care of her very closely at home and built multiple ramps at home for her and waits on her hand and foot at home. Rest of her family lives in [place], [place]. We discussed that it would be good to have a nurse/therapist immediately in post op period as well given that she had fall at home and ID reported she wasn't thriving well at home when they saw her for  a follow up and should strongly consider SNF for " at least 1-2 weeks after this stay now and she said that her  really wants her home which is what CM also reported. She is not adverse to SNF herself it seems like discussing this. Will discuss more with CM and family tomorrow as would really benefit from SNF right now in short term at least a week or two when immediately post op for risks of being at home and safety. She said sounds good to sebastian more tomorrow with fam.      Review of patient's allergies indicates:  No Known Allergies    No current facility-administered medications on file prior to encounter.     Current Outpatient Medications on File Prior to Encounter   Medication Sig    acetaminophen (TYLENOL) 325 MG tablet Take 650 mg by mouth every 8 (eight) hours as needed for Pain.    amlodipine-benazepril 5-20 mg (LOTREL) 5-20 mg per capsule Take 1 capsule by mouth once daily.    apixaban (ELIQUIS) 5 mg Tab Take 1 tablet (5 mg total) by mouth 2 (two) times daily.    cyclobenzaprine (FLEXERIL) 10 MG tablet Take 1 tablet (10 mg total) by mouth 3 (three) times daily as needed for Muscle spasms.    docusate sodium (COLACE) 100 MG capsule Take 1 capsule (100 mg total) by mouth 2 (two) times daily.    doxycycline (VIBRAMYCIN) 100 MG Cap Take 1 capsule (100 mg total) by mouth every 12 (twelve) hours.    EScitalopram oxalate (LEXAPRO) 20 MG tablet Take 1 tablet (20 mg total) by mouth every evening.    metoprolol tartrate (LOPRESSOR) 50 MG tablet Take 1 tablet (50 mg total) by mouth 2 (two) times daily.    nystatin (MYCOSTATIN) powder Apply to affected area 3 times daily    omeprazole (PRILOSEC) 40 MG capsule Take 1 capsule (40 mg total) by mouth every morning.    oxyCODONE-acetaminophen (PERCOCET)  mg per tablet Take 1 tablet by mouth every 4 to 6 hours as needed for Pain.    pregabalin (LYRICA) 200 MG Cap Take 1 capsule (200 mg total) by mouth 3 (three) times daily. TAKE 1 CAPSULE(200 MG) BY MOUTH THREE TIMES DAILY    sodium chloride 0.9% SolP 50 mL  with DAPTOmycin 500 mg SolR 500 mg Inject 500 mg into the vein once daily. End date 2/29/24    traMADoL (ULTRAM) 50 mg tablet Take 2 tablets by mouth every 12 (twelve) hours as needed for Pain.    valACYclovir (VALTREX) 1000 MG tablet Take 1 tablet (1,000 mg total) by mouth 2 (two) times daily. for 7 days    [DISCONTINUED] pantoprazole (PROTONIX) 40 MG tablet Take 1 tablet (40 mg total) by mouth 2 (two) times daily.     Family History       Problem Relation (Age of Onset)    COPD Brother    Coronary artery disease Father    Depression Father    Diabetes Mother, Father, Sister, Brother    Heart disease Father    Hypertension Mother, Father    Irritable bowel syndrome Mother          Tobacco Use    Smoking status: Never    Smokeless tobacco: Never   Substance and Sexual Activity    Alcohol use: No    Drug use: No    Sexual activity: Not Currently     Review of Systems   All other systems reviewed and are negative.    Objective:     Vital Signs (Most Recent):  Temp: 98.7 °F (37.1 °C) (02/18/24 0710)  Pulse: 98 (02/18/24 0710)  Resp: 17 (02/18/24 0850)  BP: (!) 150/76 (02/18/24 0710)  SpO2: 96 % (02/18/24 0832) Vital Signs (24h Range):  Temp:  [97.9 °F (36.6 °C)-98.7 °F (37.1 °C)] 98.7 °F (37.1 °C)  Pulse:  [84-98] 98  Resp:  [16-18] 17  SpO2:  [96 %-97 %] 96 %  BP: (122-185)/(61-80) 150/76     Weight: 67.9 kg (149 lb 11.1 oz)  Body mass index is 24.16 kg/m².     Physical Exam  Vitals and nursing note reviewed.   Constitutional:       General: She is not in acute distress.     Appearance: She is well-developed. She is not diaphoretic.      Comments: Awake on exam. Mildly pale   HENT:      Head: Normocephalic and atraumatic.   Eyes:      General: No scleral icterus.     Conjunctiva/sclera: Conjunctivae normal.      Comments: Disconjugate gaze (baseline)   Neck:      Vascular: No JVD.   Cardiovascular:      Rate and Rhythm: Normal rate.   Pulmonary:      Effort: Pulmonary effort is normal. No respiratory distress.       Breath sounds: No wheezing.   Musculoskeletal:      Right lower leg: Edema present.      Left lower leg: Edema present.      Comments: Bandages to LLE and drain with serosanginuous exudate. PICC in RUE    Skin:     Coloration: Skin is not jaundiced or pale.   Neurological:      Mental Status: She is alert and oriented to person, place, and time.      Motor: No abnormal muscle tone.   Psychiatric:         Mood and Affect: Mood normal.         Behavior: Behavior normal.                Significant Labs: All pertinent labs within the past 24 hours have been reviewed.  CBC:   Recent Labs   Lab 02/17/24  0550 02/18/24  0457   WBC 9.02 9.89   HGB 8.9* 9.1*   HCT 27.5* 28.2*    380       CMP:   Recent Labs   Lab 02/17/24  0550 02/18/24  0457    136   K 4.0 3.8    106   CO2 23 25   GLU 87 80   BUN 15 11   CREATININE 0.8 0.7   CALCIUM 7.6* 7.7*   PROT 4.9* 5.1*   ALBUMIN 1.3* 1.3*   BILITOT 0.2 0.3   ALKPHOS 108 118   AST 17 18   ALT 6* 6*   ANIONGAP 6* 5*         Significant Imaging: I have reviewed all pertinent imaging results/findings within the past 24 hours.    Assessment/Plan:      * Antibiotic Spacer failure status post Girdlestone procedure  See fracture/ infection      Hypophosphatemia  Patient has Abnormal Phosphorus: hypophosphatemia. Will continue to monitor electrolytes closely. Will replace the affected electrolytes and repeat labs to be done after interventions completed. The patient's phosphorus results have been reviewed and are listed below.  Recent Labs   Lab 02/17/24  0550   PHOS 1.9*        Bacteriuria  K pneumo in urine cx, 3 days rocephin per ID recs      Vitamin D deficiency  Low at 9 and ergo started      Hypocalcemia  Patient has hypocalcemia due to Critical Illness which is currently uncontrolled, and has been confirmed with ionized and/or corrected calcium. Will replace calcium and monitor electrolytes closely. The latest calcium labs have been reviewed and are listed  "below.  Recent Labs   Lab 02/16/24  0410   CALCIUM 7.6*       No results for input(s): "CAION" in the last 24 hours.  -low at 8.5 corrected for albuminin the 1/s and replacemet started on 2/15 with improvements        Urinary retention  Had urinary retention during recent admission, discharged with [facility] and Urology follow-up. Will monitor output and re-attempt voiding trials after surgery.   Will try voiding trial 2/16      Staphylococcal arthritis of left hip  Recent MRSA arthritis during recent admission, planned for Daptomycin daily until 2/29 per ID recs. Will continue this now per ID recs, likely will need to restart timeline for 6 weeks per ID notes and rec end date of 3/28 with weekly labs. They rec going to SNf for antibiotics and will discuss with patient family further as they prefer HH per cm notes Picc in place to RUE      Chronic heart failure with preserved ejection fraction  Recovered EF on recent TTE. Will monitor volume status and diurese as needed.       Hypokalemia  Patient has hypokalemia which is Acute and currently controlled. Most recent potassium levels reviewed-   Lab Results   Component Value Date    K 4.4 02/16/2024   . Will continue potassium replacement per protocol and recheck repeat levels after replacement completed.     Paroxysmal atrial fibrillation  Currently controlled and NSR on admit Continue home Metoprolol. Eliquis resumed post op    Iron deficiency anemia  Chronically severe, and stable around baseline. With baseline hg 7-9, tranfusions on 2/15 for acute blood loss anemia.    Peripheral neuropathy  Continue Lyrica.     Essential hypertension  Hold bp meds 2/16 for lower bps post op  BP uptick on 2/18 so add back norvasc and lower dose metoprolol    Femur fracture, left  Recent revision of L hip replacement due to septic arthritis, and presents with pain following a fall to the left hip. CT shows acute fracture involving screw at junction of left femoral head and neck " components of left hip hemiarthroplasty, with superior displacement of the distal fragment. Ortho consulted and had revision of spacer on 2/15, Revision placement of articulating cement coated antibiotic spacer left hip, Excisional and non excisional debridement and irrigation left hip, skin, subcutaneous tissue fascia and muscle  -TTWB for 6 weeks to LLE with posterior hip precautions per ortho, will need SNF as failed home treatments but fam hesitant per notes, will discuss further with them.  She reports is amenable but states  wants her home. Discussed more on 2/18 will f/u with CM and family tomorrow more  -pain control with multimodals  -hg stable at 10--8.9 post op now, bowel regimen  -CX NG so far, will need 6 weeks antibiotics restarted per ID, on dapto now  -drain in place post op       Chronic pain with uncomplicated opioid dependence  Continue home regimen, verified by PDMP with breakthrough meds available given acute fracture.         VTE Risk Mitigation (From admission, onward)           Ordered     apixaban tablet 2.5 mg  2 times daily         02/15/24 2051     Place sequential compression device  Until discontinued         02/15/24 1017     IP VTE HIGH RISK PATIENT  Once         02/15/24 1017     Place sequential compression device  Until discontinued         02/14/24 2333                    Discharge Planning   BAILEY: 2/19/2024     Code Status: Full Code   Is the patient medically ready for discharge?: No    Reason for patient still in hospital (select all that apply): Patient trending condition  Discharge Plan A: Home with family, Home Health                  Mayte Rogers MD  Department of Hospital Medicine   [facility] - Surgery

## 2024-02-18 NOTE — PT/OT/SLP PROGRESS
Occupational Therapy   Co-Treatment    Name: Forilan Ray  MRN: [id number]  Admitting Diagnosis:  Status post Girdlestone procedure  3 Days Post-Op    Recommendations:     Discharge Recommendations: Moderate Intensity Therapy  Discharge Equipment Recommendations:  none  Barriers to discharge:   (increased skilled (A) required)    Assessment:     Froilan Ray is a 78 y.o. female with a medical diagnosis of Status post Girdlestone procedure.  She presents with performance deficits affecting function are weakness, impaired endurance, impaired self care skills, impaired functional mobility, gait instability, impaired balance, pain, decreased safety awareness, orthopedic precautions.   Pt agreeable to participate with therapy this date. Pt able to perform hair care sitting EOB with SBA and then STS with Mod A x 2 persons for 2 trials with RW. Pt will continue to benefit from skilled OT services to address impairments listed above to maximize independence with ADLs and functional mobility to ensure safe return to PLOF.     Rehab Prognosis:  Good; patient would benefit from acute skilled OT services to address these deficits and reach maximum level of function.       Plan:     Patient to be seen 4 x/week to address the above listed problems via self-care/home management, therapeutic activities, therapeutic exercises  Plan of Care Expires: 03/17/24  Plan of Care Reviewed with: patient    Subjective     Chief Complaint: pain during mobility   Patient/Family Comments/goals: get better  Pain/Comfort:  Pain Rating 1:  (unrated)  Location - Side 1: Left  Location - Orientation 1: generalized  Location 1: hip  Pain Addressed 1: Reposition, Distraction, Cessation of Activity  Pain Rating Post-Intervention 1: 8/10    Objective:   co-treatment performed this date due to need for 2 skilled therapists in order to ensure pt safety, accomodate for pt activity tolerance, and maximize functional potential    Communicated with: RN  prior to session.  Patient found HOB elevated with FCD, BEULAH drain, bed alarm upon OT entry to room.    General Precautions: Standard, fall    Orthopedic Precautions:LLE toe touch weight bearing, LLE posterior precautions  Braces: N/A  Respiratory Status: Room air     Occupational Performance:     Bed Mobility:    Patient completed Rolling/Turning to Right with moderate assistance  Patient completed Scooting/Bridging with moderate assistance  Patient completed Supine to Sit with moderate assistance and 2 persons  Patient completed Sit to Supine with moderate assistance and 2 persons     Functional Mobility/Transfers:  Patient completed Sit <> Stand Transfer with moderate assistance and of 2 persons  with  rolling walker   Functional Mobility: x2 trials for STS.   Pt able to take two side steps to the R with Max A x 2 persons, PT placed her foot under pt's L heel to assist with maintenance of NWB, pt demo increased WB on 2nd step so deferred further mobility.   Sitting balance- EOB with SBA-CGA    Activities of Daily Living:  Grooming: stand by assistance hair care and facial hygiene sitting EOB  Lower Body Dressing: moderate assistance pt able to don R sock with bed in semi fowlers, she attempted to don L foot but unable to maintain L hip precautions despite cues so therapist donned L sock   UB Dressing: SBA donning back gown      [facility] 6 Click ADL: 16    Treatment & Education:  Pt educated on   Role of OT and OT POC  Safe transfer techniques and proper body mechanics for fall prevention and improved independence with functional transfers  Importance of OOB activities to increase endurance and tolerance for increased participation in daily ADLs    Utilizing the call bell to request for assistance with all functional mobility to ensure safety during hospital stay.    Pt verbalized understanding and all questions were addressed within the scope of OT.     Patient left HOB elevated with all lines intact, call button in  reach, bed alarm on, and RN notified    GOALS:   Multidisciplinary Problems       Occupational Therapy Goals          Problem: Occupational Therapy    Goal Priority Disciplines Outcome Interventions   Occupational Therapy Goal     OT, PT/OT Ongoing, Progressing    Description: Goals to be met by: 3/17/24     Patient will increase functional independence with ADLs by performing:    LE Dressing with Moderate Assistance.  Grooming while standing at sink with Stand-by Assistance.  Toileting from bedside commode with Moderate Assistance for hygiene and clothing management.   Toilet transfer to bedside commode with Moderate Assistance.                         Time Tracking:     OT Date of Treatment: 02/18/24  OT Start Time: 0827  OT Stop Time: 0846  OT Total Time (min): 19 min    Billable Minutes:Therapeutic Activity 19    OT/PREETI: OT          2/18/2024

## 2024-02-18 NOTE — PROGRESS NOTES
[facility] - [facility]  Orthopedics  Progress Note    Patient Name: Froilan Ray  MRN: [id number]  Admission Date: 2/14/2024  Hospital Length of Stay: 4 days  Attending Provider: Mayte Rogers MD  Primary Care Provider: Alphonse Boothe III, MD  Follow-up For: Procedure(s) (LRB):  Revision hip antibiotic spacer head exchange, left, lateral, peg board, depuy osteomed in room, vanc, ancef, txa, (Left)  IRRIGATION AND DEBRIDEMENT, LOWER EXTREMITY (Left)  FLAP GRAFT, LOCAL (Left)    Post-Operative Day: 3 Days Post-Op  Subjective:     Principal Problem:Status post Girdlestone procedure    Principal Orthopedic Problem: s/p left hip revision antibiotic spacer placement on 2/15    Interval History: NAEON. VSS. L hip pain controlled. Drain output 30cc from [id number], Hgb 9.1. NGTD from intraoperative cultures. Performed sit to stand x 2 with moderate assistance. Patient unable to ambulate due to pain on two attempts, PT recommending moderate intensity therapy.    Review of patient's allergies indicates:  No Known Allergies    Current Facility-Administered Medications   Medication    amLODIPine tablet 5 mg    apixaban tablet 2.5 mg    bisacodyL suppository 10 mg    calcium carbonate 200 mg calcium (500 mg) chewable tablet 1,000 mg    cefTRIAXone (ROCEPHIN) 2 g in dextrose 5 % in water (D5W) 100 mL IVPB (MB+)    cyclobenzaprine tablet 10 mg    DAPTOmycin (CUBICIN) 675 mg in sodium chloride 0.9% SolP 50 mL IVPB    docusate sodium capsule 100 mg    ergocalciferol capsule 50,000 Units    EScitalopram oxalate tablet 20 mg    melatonin tablet 6 mg    metoprolol tartrate (LOPRESSOR) tablet 25 mg    oxyCODONE immediate release tablet 5 mg    And    oxyCODONE immediate release tablet Tab 10 mg    And    morphine injection 2 mg    nystatin powder    ondansetron injection 4 mg    polyethylene glycol packet 17 g    potassium, sodium phosphates 280-160-250 mg packet 2 packet    pregabalin capsule 200 mg    sodium chloride 0.9%  "flush 10 mL    sodium chloride 0.9% flush 10 mL    vitamin D 1000 units tablet 1,000 Units     Objective:     Vital Signs (Most Recent):  Temp: 98.7 °F (37.1 °C) (02/18/24 0710)  Pulse: 98 (02/18/24 0710)  Resp: 17 (02/18/24 0850)  BP: (!) 150/76 (02/18/24 0710)  SpO2: 96 % (02/18/24 0832) Vital Signs (24h Range):  Temp:  [97.9 °F (36.6 °C)-98.7 °F (37.1 °C)] 98.7 °F (37.1 °C)  Pulse:  [84-98] 98  Resp:  [16-18] 17  SpO2:  [96 %-97 %] 96 %  BP: (122-185)/(61-80) 150/76     Weight: 67.9 kg (149 lb 11.1 oz)  Height: 5' 6" (167.6 cm)  Body mass index is 24.16 kg/m².      Intake/Output Summary (Last 24 hours) at 2/18/2024 0911  Last data filed at 2/18/2024 0858  Gross per 24 hour   Intake --   Output 240 ml   Net -240 ml         Ortho/SPM Exam     Awake/alert/oriented x3, No acute distress, Afebrile, Vital signs stable  Good inspiratory effort with unlaboured breathing  Drain in place  Dressings c/d/i  NVI in operative limb     Significant Labs: All pertinent labs within the past 24 hours have been reviewed.    Significant Imaging: I have reviewed all pertinent imaging results/findings.  Assessment/Plan:     * Antibiotic Spacer failure status post Girdlestone procedure  Froilan Ray is a 78 y.o. 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

## 2024-02-18 NOTE — PLAN OF CARE
[facility]      HOME HEALTH ORDERS  FACE TO FACE ENCOUNTER    Patient Name: Froilan Ray  YOB: 1945    PCP: Alphonse Boothe III, MD   PCP Address: [address] [place] [address] / [facility] [address]  PCP Phone Number: [phone]  PCP Fax: [phone]    Encounter Date: 2/14/24    Admit to Home Health    Diagnoses:  Active Hospital Problems    Diagnosis  POA    *Antibiotic Spacer failure status post Girdlestone procedure [Z98.890]  Not Applicable    Hypophosphatemia [E83.39]  No    Bacteriuria [R82.71]  Yes     Gram negative rods      Hypocalcemia [E83.51]  Yes    Hypoalbuminemia [E88.09]  Yes    Vitamin D deficiency [E55.9]  Yes    Urinary retention [R33.9]  Yes     Chronic    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]  Yes     Chronic    Chronic heart failure with preserved ejection fraction [I50.32]  Yes    Hypokalemia [E87.6]  Yes    Paroxysmal atrial fibrillation [I48.0]  Yes    Iron deficiency anemia [D50.9]  Yes     Chronic    Essential hypertension [I10]  Yes     Chronic    Peripheral neuropathy [G62.9]  Yes     Chronic    Femur fracture, left [S72.92XA]  Yes    Chronic pain with uncomplicated opioid dependence [F11.20]  Yes     Chronic      Resolved Hospital Problems    Diagnosis Date Resolved POA    Preoperative examination [Z01.818] 02/16/2024 Not Applicable       Follow Up Appointments:  Future Appointments   Date Time Provider Department Center   2/23/2024  2:30 PM Marie Hickman NP [facility] UROLOGY [facility]   2/29/2024 10:00 AM Haroldo Morfin MD [facility]   2/29/2024 10:30 AM Sreedhar Carlos NP [facility] ORTHO [address]   3/28/2024 11:30 AM Sreedhar Carlos NP [facility] ORTHO [address]       Allergies:Review of patient's allergies indicates:  No Known Allergies    Medications: Review discharge medications with patient and family and provide education.    Current Facility-Administered Medications   Medication Dose Route Frequency Provider Last Rate Last  Admin    amLODIPine tablet 5 mg  5 mg Oral Daily Mayte Rogers MD   5 mg at 02/18/24 0851    apixaban tablet 5 mg  5 mg Oral BID Mayte Rogers MD        bisacodyL suppository 10 mg  10 mg Rectal Daily PRN Santosh Hoffman MD   10 mg at 02/16/24 1334    calcium carbonate 200 mg calcium (500 mg) chewable tablet 1,000 mg  1,000 mg Oral Daily Mayte Rogers MD   1,000 mg at 02/18/24 0852    cefTRIAXone (ROCEPHIN) 2 g in dextrose 5 % in water (D5W) 100 mL IVPB (MB+)  2 g Intravenous Q24H Haroldo Morfin MD   Stopped at 02/17/24 1715    cyclobenzaprine tablet 10 mg  10 mg Oral TID PRN Dilshad Pearl MD   10 mg at 02/14/24 2330    DAPTOmycin (CUBICIN) 675 mg in sodium chloride 0.9% SolP 50 mL IVPB  10 mg/kg Intravenous Q24H Dilshad Pearl MD   Stopped at 02/18/24 0158    docusate sodium capsule 100 mg  100 mg Oral BID Dilshad Pearl MD   100 mg at 02/18/24 0852    ergocalciferol capsule 50,000 Units  50,000 Units Oral Q7 Days Mayte Rogers MD   50,000 Units at 02/15/24 0900    EScitalopram oxalate tablet 20 mg  20 mg Oral QHS Dilshad Pearl MD   20 mg at 02/17/24 2047    melatonin tablet 6 mg  6 mg Oral Nightly PRN Santosh Hoffman MD   6 mg at 02/15/24 0215    metoprolol tartrate (LOPRESSOR) tablet 50 mg  50 mg Oral BID Mayte Rogers MD        oxyCODONE immediate release tablet 5 mg  5 mg Oral Q4H PRN Dilshad Pearl MD        And    oxyCODONE immediate release tablet Tab 10 mg  10 mg Oral Q4H PRN Dilshad Pearl MD   10 mg at 02/18/24 0850    And    morphine injection 2 mg  2 mg Intravenous Q6H PRN Dilshad Pearl MD   2 mg at 02/18/24 1133    nystatin powder   Topical (Top) TID Dilshad Pearl MD   Given at 02/18/24 1304    ondansetron injection 4 mg  4 mg Intravenous Q12H PRN Santosh Hoffman MD        polyethylene glycol packet 17 g  17 g Oral Daily Mayte Rogers MD   17 g at 02/18/24 1555    potassium, sodium  phosphates 280-160-250 mg packet 2 packet  2 packet Oral QID (AC & HS) Mayte Rogers MD   2 packet at 02/18/24 1105    pregabalin capsule 200 mg  200 mg Oral TID Dilshad Pearl MD   200 mg at 02/18/24 0852    sodium chloride 0.9% flush 10 mL  10 mL Intravenous PRN Santosh Hoffman MD        sodium chloride 0.9% flush 10 mL  10 mL Intravenous PRN Paul Jimenez MD        vitamin D 1000 units tablet 1,000 Units  1,000 Units Oral Daily VICTORIANO Arambula MD   1,000 Units at 02/18/24 0852     Current Discharge Medication List        START taking these medications    Details   calcium carbonate (TUMS) 200 mg calcium (500 mg) chewable tablet Take 2 tablets (1,000 mg total) by mouth once daily.  Qty: 60 tablet, Refills: 11      ergocalciferol (ERGOCALCIFEROL) 50,000 unit Cap Take 1 capsule (50,000 Units total) by mouth every 7 days.  Qty: 15 capsule, Refills: 2      oxyCODONE (ROXICODONE) 10 mg Tab immediate release tablet Take 1/2 pill for pain scale 4-7 and 1 pill for pain scale 7-10 every 4 hours prn  Qty: 42 tablet, Refills: 0    Comments: Quantity prescribed more than 7 day supply? No      polyethylene glycol (GLYCOLAX) 17 gram PwPk Take 17 g by mouth once daily.  Qty: 30 packet, Refills: 1           CONTINUE these medications which have CHANGED    Details   acetaminophen (TYLENOL) 325 MG tablet Take 2 tablets (650 mg total) by mouth every 8 (eight) hours as needed for Pain (pain scale 1-4).  Refills: 0      pregabalin (LYRICA) 200 MG Cap Take 1 capsule (200 mg total) by mouth 3 (three) times daily.  Qty: 90 capsule, Refills: 0      sodium chloride 0.9% SolP 50 mL with DAPTOmycin 500 mg SolR 676 mg Inject 676 mg into the vein Daily. End date 3/28/24           CONTINUE these medications which have NOT CHANGED    Details   amlodipine-benazepril 5-20 mg (LOTREL) 5-20 mg per capsule Take 1 capsule by mouth once daily.  Qty: 90 capsule, Refills: 3    Comments: .      apixaban (ELIQUIS) 5 mg Tab Take  1 tablet (5 mg total) by mouth 2 (two) times daily.  Qty: 60 tablet, Refills: 11      cyclobenzaprine (FLEXERIL) 10 MG tablet Take 1 tablet (10 mg total) by mouth 3 (three) times daily as needed for Muscle spasms.    Associated Diagnoses: Failed back syndrome of lumbar spine; Lumbar radiculopathy; DDD (degenerative disc disease), lumbar; Other spondylosis, lumbar region      docusate sodium (COLACE) 100 MG capsule Take 1 capsule (100 mg total) by mouth 2 (two) times daily.  Qty: 60 capsule, Refills: 1      EScitalopram oxalate (LEXAPRO) 20 MG tablet Take 1 tablet (20 mg total) by mouth every evening.      metoprolol tartrate (LOPRESSOR) 50 MG tablet Take 1 tablet (50 mg total) by mouth 2 (two) times daily.  Qty: 90 tablet, Refills: 1    Comments: .      nystatin (MYCOSTATIN) powder Apply to affected area 3 times daily  Qty: 30 g, Refills: 0    Associated Diagnoses: MRSA bacteremia; Staphylococcal arthritis of left hip; Tinea      omeprazole (PRILOSEC) 40 MG capsule Take 1 capsule (40 mg total) by mouth every morning.           STOP taking these medications       doxycycline (VIBRAMYCIN) 100 MG Cap Comments:   Reason for Stopping:         oxyCODONE-acetaminophen (PERCOCET)  mg per tablet Comments:   Reason for Stopping:         traMADoL (ULTRAM) 50 mg tablet Comments:   Reason for Stopping:         valACYclovir (VALTREX) 1000 MG tablet Comments:   Reason for Stopping:                 I have seen and examined this patient within the last 30 days. My clinical findings that support the need for the home health skilled services and home bound status are the following:no   Weakness/numbness causing balance and gait disturbance due to Fracture, Infection, and Weakness/Debility making it taxing to leave home.  Requiring assistive device to leave home due to unsteady gait caused by  Fracture, Infection, and Weakness/Debility.     Diet:   regular diet    Labs:  CBC, CMP, CRP, CK drawn every Monday via PICC and results  faxed to [facility] re dr Morfin at [phone]     Referrals/ Consults  Physical Therapy to evaluate and treat. Evaluate for home safety and equipment needs; Establish/upgrade home exercise program. Perform / instruct on therapeutic exercises, gait training, transfer training, and Range of Motion.  Occupational Therapy to evaluate and treat. Evaluate home environment for safety and equipment needs. Perform/Instruct on transfers, ADL training, ROM, and therapeutic exercises.  Aide to provide assistance with personal care, ADLs, and vital signs.    Activities:   TTWB to LLE x 6 weeks with posterior hip precautions    Nursing:   Agency to admit patient within 24 hours of hospital discharge unless specified on physician order or at patient request    SN to complete comprehensive assessment including routine vital signs. Instruct on disease process and s/s of complications to report to MD. Review/verify medication list sent home with the patient at time of discharge  and instruct patient/caregiver as needed. Frequency may be adjusted depending on start of care date.     Skilled nurse to perform up to 3 visits PRN for symptoms related to diagnosis    Notify MD if SBP > 160 or < 90; DBP > 90 or < 50; HR > 120 or < 50; Temp > 101; O2 < 88%; Other:       Ok to schedule additional visits based on staff availability and patient request on consecutive days within the home health episode.    When multiple disciplines ordered:    Start of Care occurs on Sunday - Wednesday schedule remaining discipline evaluations as ordered on separate consecutive days following the start of care.    Thursday SOC -schedule subsequent evaluations Friday and Monday the following week.     Friday - Saturday SOC - schedule subsequent discipline evaluations on consecutive days starting Monday of the following week.    For all post-discharge communication and subsequent orders please contact patient's primary care physician. If unable to reach  primary care physician or do not receive response within 30 minutes, please contact [facility] on call line for clinical staff order clarification    Miscellaneous   Routine Skin for Bedridden Patients: Instruct patient/caregiver to apply moisture barrier cream to all skin folds and wet areas in perineal area daily and after baths and all bowel movements.  Home Infusion Therapy:   SN to perform Infusion Therapy/Central Line Care.  Review Central Line Care & Central Line Flush with patient.    Administer (drug and dose): daptomycin inject 676 mg into vein via PICC daily, end date 3/28/24     Last dose given: 2/19/24                         Home dose due: 2/20/24    Scrub the Hub: Prior to accessing the line, always perform a 30 second alcohol scrub  Each lumen of the central line is to be flushed at least daily with 10 mL Normal Saline and 3 mL Heparin flush (10 units/mL)  Skilled Nurse (SN) may draw blood from IV access  Blood Draw Procedure:   - Aspirate at least 5 mL of blood   - Discard   - Obtain specimen   - Change injection cap   - Flush with 20 mL Normal Saline followed by a                 3-5 mL Heparin flush (10 units/mL)  Central :   - Sterile dressing changes are done weekly and as needed.   - Use chlor-hexadine scrub to cleanse site, apply Biopatch to insertion site,       apply securement device dressing   - Injection caps are changed weekly and after EVERY lab draw.   - If sterile gauze is under dressing to control oozing,                 dressing change must be performed every 24 hours until gauze is not needed.    Home Health Aide:  Nursing Three times weekly, Physical Therapy Three times weekly, and Occupational Therapy Three times weekly    Wound Care Orders  Routine Skin for Bedridden Patients:  Apply moisture barrier cream to all                                                                          Wound care- buttocks- cleanse wound with soap and water, pat dry. Apply a foam  border(mepilex) to wound bed BID. Apply this PRN due to soilage as well.         Keep bandages to LLE in place until orthopedics follow up. Do not get wet or immerse in water. Reinforce PRN if fraying.        I certify that this patient is confined to her home and needs intermittent skilled nursing care, physical therapy, and occupational therapy.

## 2024-02-18 NOTE — ASSESSMENT & PLAN NOTE
Froilan Ray is a 78 y.o. 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

## 2024-02-18 NOTE — PLAN OF CARE
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]  Yes     Chronic    Chronic heart failure with preserved ejection fraction [I50.32]  Yes    Hypokalemia [E87.6]  Yes    Paroxysmal atrial fibrillation [I48.0]  Yes    Iron deficiency anemia [D50.9]  Yes     Chronic    Essential hypertension [I10]  Yes     Chronic    Peripheral neuropathy [G62.9]  Yes     Chronic    Femur fracture, left [S72.92XA]  Yes    Chronic pain with uncomplicated opioid dependence [F11.20]  Yes     Chronic      Resolved Hospital Problems    Diagnosis Date Resolved POA    Preoperative examination [Z01.818] 02/16/2024 Not Applicable       Patient is homebound due to:  Status post Girdlestone procedure    Allergies:Review of patient's allergies indicates:  No Known Allergies    Vitals:  Every shift    Diet: regular diet    Activities:   TTWB to LLE x 6 weeks with posterior hip precautions    Goals of Care Treatment Preferences:  Code Status: Full Code      Labs:  CBC, CMP, CRP, CK drawn every Monday via PICC and results faxed to [facility] re dr Morfin at [phone]    Nursing Precautions:  Fall and Pressure ulcer prevention    Consults:   PT to evaluate and treat- 5 times a week and OT to evaluate and treat- 5 times a week     Miscellaneous Care: Routine Skin for Bedridden Patients:  Apply moisture barrier cream to all        Wound care- buttocks- cleanse  wound with soap and water, pat dry. Apply a foam border(mepilex) to wound bed BID. Apply this PRN due to soilage as well.    Keep bandages to LLE in place until orthopedics follow up. Do not get wet or immerse in water. Reinforce PRN if fraying.               Medications: Discontinue all previous medication orders, if any. See new list below.     Medication List        START taking these medications      calcium carbonate 200 mg calcium (500 mg) chewable tablet  Commonly known as: TUMS  Take 2 tablets (1,000 mg total) by mouth once daily.  Start taking on: February 19, 2024     ergocalciferol 50,000 unit Cap  Commonly known as: ERGOCALCIFEROL  Take 1 capsule (50,000 Units total) by mouth every 7 days.  Start taking on: February 22, 2024     melatonin 3 mg tablet  Commonly known as: MELATIN  Take 2 tablets (6 mg total) by mouth nightly as needed for Insomnia.     * oxyCODONE 10 mg Tab immediate release tablet  Commonly known as: ROXICODONE  Take 1 tablet (10 mg total) by mouth every 4 (four) hours as needed (pain scale 7-10).     * oxyCODONE 5 MG immediate release tablet  Commonly known as: ROXICODONE  Take 1 tablet (5 mg total) by mouth every 4 (four) hours as needed (pain scale 4-7).     polyethylene glycol 17 gram Pwpk  Commonly known as: GLYCOLAX  Take 17 g by mouth once daily.  Start taking on: February 19, 2024     sodium chloride 0.9% SolP 50 mL with DAPTOmycin 500 mg SolR 676 mg  Inject 676 mg into the vein Daily. End date 3/28/24  Replaces: sodium chloride 0.9% SolP 50 mL with DAPTOmycin 500 mg SolR 500 mg           * This list has 2 medication(s) that are the same as other medications prescribed for you. Read the directions carefully, and ask your doctor or other care provider to review them with you.                CHANGE how you take these medications      acetaminophen 325 MG tablet  Commonly known as: TYLENOL  Take 2 tablets (650 mg total) by mouth every 8 (eight) hours as needed for Pain (pain scale  1-4).  What changed: reasons to take this     pregabalin 200 MG Cap  Commonly known as: LYRICA  Take 1 capsule (200 mg total) by mouth 3 (three) times daily.  What changed: additional instructions            CONTINUE taking these medications      amlodipine-benazepril 5-20 mg 5-20 mg per capsule  Commonly known as: LOTREL  Take 1 capsule by mouth once daily.     apixaban 5 mg Tab  Commonly known as: ELIQUIS  Take 1 tablet (5 mg total) by mouth 2 (two) times daily.     cyclobenzaprine 10 MG tablet  Commonly known as: FLEXERIL  Take 1 tablet (10 mg total) by mouth 3 (three) times daily as needed for Muscle spasms.     docusate sodium 100 MG capsule  Commonly known as: COLACE  Take 1 capsule (100 mg total) by mouth 2 (two) times daily.     EScitalopram oxalate 20 MG tablet  Commonly known as: LEXAPRO  Take 1 tablet (20 mg total) by mouth every evening.     metoprolol tartrate 50 MG tablet  Commonly known as: LOPRESSOR  Take 1 tablet (50 mg total) by mouth 2 (two) times daily.     nystatin powder  Commonly known as: MYCOSTATIN  Apply to affected area 3 times daily     omeprazole 40 MG capsule  Commonly known as: PRILOSEC  Take 1 capsule (40 mg total) by mouth every morning.            STOP taking these medications      doxycycline 100 MG Cap  Commonly known as: VIBRAMYCIN     oxyCODONE-acetaminophen  mg per tablet  Commonly known as: PERCOCET     sodium chloride 0.9% SolP 50 mL with DAPTOmycin 500 mg SolR 500 mg  Replaced by: sodium chloride 0.9% SolP 50 mL with DAPTOmycin 500 mg SolR 676 mg     traMADoL 50 mg tablet  Commonly known as: ULTRAM     valACYclovir 1000 MG tablet  Commonly known as: VALTREX                Immunizations Administered as of 2/18/2024       Name Date Dose VIS Date Route Exp Date    COVID-19, MRNA, LN-S, PF (Pfizer) (Purple Cap) 8/18/2021 0.3 mL 5/10/2021 Intramuscular --    Site: Left arm     : [employer]     Lot: [id number]     Comment: Adminis     COVID-19, MRNA, LN-S, PF  (Pfizer) (Purple Cap) 7/27/2021 0.3 mL 5/10/2021 Intramuscular --    Site: Right arm     : Pf[employer]     Lot: [id number]     Comment: Adminis             This patient has had both covid vaccinations    Some patients may experience side effects after vaccination.  These may include fever, headache, muscle or joint aches.  Most symptoms resolve with 24-48 hours and do not require urgent medical evaluation unless they persist for more than 72 hours or symptoms are concerning for an unrelated medical condition.          _________________________________  Mayte Rogers MD  02/19/2024

## 2024-02-18 NOTE — ASSESSMENT & PLAN NOTE
Hold bp meds 2/16 for lower bps post op  BP uptick on 2/18 so add back norvasc and lower dose metoprolol

## 2024-02-18 NOTE — NURSING
Nurses Note -- 4 Eyes      2/18/2024   3:19 PM      Skin assessed during: Q Shift Change      [x] No Altered Skin Integrity Present    []Prevention Measures Documented      [] Yes- Altered Skin Integrity Present or Discovered   [] LDA Added if Not in Epic (Describe Wound)   [] New Altered Skin Integrity was Present on Admit and Documented in LDA   [] Wound Image Taken    Wound Care Consulted? No    Attending Nurse:  Teena Watson lpn    Second RN/Staff Member: Jodi Brown RN

## 2024-02-18 NOTE — SUBJECTIVE & OBJECTIVE
Interval history- she was in good spirits this morning, BM this morning. She said that pain at times with movement and therapy and has multimodals for pain now including oxy 5/10 and muscle relaxers for pain control. Hg stable on review at 9.1 and crp improved from previous at 80. She reports she knows it will take some time for pain to improve given nature of injury. Discussed snf with her, she said her  takes care of her very closely at home and built multiple ramps at home for her and waits on her hand and foot at home. Rest of her family lives in [place], [place]. We discussed that it would be good to have a nurse/therapist immediately in post op period as well given that she had fall at home and ID reported she wasn't thriving well at home when they saw her for  a follow up and should strongly consider SNF for at least 1-2 weeks after this stay now and she said that her  really wants her home which is what CM also reported. She is not adverse to SNF herself it seems like discussing this. Will discuss more with CM and family tomorrow as would really benefit from SNF right now in short term at least a week or two when immediately post op for risks of being at home and safety. She said sounds good to sebastian more tomorrow with [facility].      Review of patient's allergies indicates:  No Known Allergies    No current facility-administered medications on file prior to encounter.     Current Outpatient Medications on File Prior to Encounter   Medication Sig    acetaminophen (TYLENOL) 325 MG tablet Take 650 mg by mouth every 8 (eight) hours as needed for Pain.    amlodipine-benazepril 5-20 mg (LOTREL) 5-20 mg per capsule Take 1 capsule by mouth once daily.    apixaban (ELIQUIS) 5 mg Tab Take 1 tablet (5 mg total) by mouth 2 (two) times daily.    cyclobenzaprine (FLEXERIL) 10 MG tablet Take 1 tablet (10 mg total) by mouth 3 (three) times daily as needed for Muscle spasms.    docusate sodium (COLACE) 100 MG capsule Take 1  capsule (100 mg total) by mouth 2 (two) times daily.    doxycycline (VIBRAMYCIN) 100 MG Cap Take 1 capsule (100 mg total) by mouth every 12 (twelve) hours.    EScitalopram oxalate (LEXAPRO) 20 MG tablet Take 1 tablet (20 mg total) by mouth every evening.    metoprolol tartrate (LOPRESSOR) 50 MG tablet Take 1 tablet (50 mg total) by mouth 2 (two) times daily.    nystatin (MYCOSTATIN) powder Apply to affected area 3 times daily    omeprazole (PRILOSEC) 40 MG capsule Take 1 capsule (40 mg total) by mouth every morning.    oxyCODONE-acetaminophen (PERCOCET)  mg per tablet Take 1 tablet by mouth every 4 to 6 hours as needed for Pain.    pregabalin (LYRICA) 200 MG Cap Take 1 capsule (200 mg total) by mouth 3 (three) times daily. TAKE 1 CAPSULE(200 MG) BY MOUTH THREE TIMES DAILY    sodium chloride 0.9% SolP 50 mL with DAPTOmycin 500 mg SolR 500 mg Inject 500 mg into the vein once daily. End date 2/29/24    traMADoL (ULTRAM) 50 mg tablet Take 2 tablets by mouth every 12 (twelve) hours as needed for Pain.    valACYclovir (VALTREX) 1000 MG tablet Take 1 tablet (1,000 mg total) by mouth 2 (two) times daily. for 7 days    [DISCONTINUED] pantoprazole (PROTONIX) 40 MG tablet Take 1 tablet (40 mg total) by mouth 2 (two) times daily.     Family History       Problem Relation (Age of Onset)    COPD Brother    Coronary artery disease Father    Depression Father    Diabetes Mother, Father, Sister, Brother    Heart disease Father    Hypertension Mother, Father    Irritable bowel syndrome Mother          Tobacco Use    Smoking status: Never    Smokeless tobacco: Never   Substance and Sexual Activity    Alcohol use: No    Drug use: No    Sexual activity: Not Currently     Review of Systems   All other systems reviewed and are negative.    Objective:     Vital Signs (Most Recent):  Temp: 98.7 °F (37.1 °C) (02/18/24 0710)  Pulse: 98 (02/18/24 0710)  Resp: 17 (02/18/24 0850)  BP: (!) 150/76 (02/18/24 0710)  SpO2: 96 % (02/18/24 0832)  Vital Signs (24h Range):  Temp:  [97.9 °F (36.6 °C)-98.7 °F (37.1 °C)] 98.7 °F (37.1 °C)  Pulse:  [84-98] 98  Resp:  [16-18] 17  SpO2:  [96 %-97 %] 96 %  BP: (122-185)/(61-80) 150/76     Weight: 67.9 kg (149 lb 11.1 oz)  Body mass index is 24.16 kg/m².     Physical Exam  Vitals and nursing note reviewed.   Constitutional:       General: She is not in acute distress.     Appearance: She is well-developed. She is not diaphoretic.      Comments: Awake on exam. Mildly pale   HENT:      Head: Normocephalic and atraumatic.   Eyes:      General: No scleral icterus.     Conjunctiva/sclera: Conjunctivae normal.      Comments: Disconjugate gaze (baseline)   Neck:      Vascular: No JVD.   Cardiovascular:      Rate and Rhythm: Normal rate.   Pulmonary:      Effort: Pulmonary effort is normal. No respiratory distress.      Breath sounds: No wheezing.   Musculoskeletal:      Right lower leg: Edema present.      Left lower leg: Edema present.      Comments: Bandages to LLE and drain with serosanginuous exudate. PICC in RUE    Skin:     Coloration: Skin is not jaundiced or pale.   Neurological:      Mental Status: She is alert and oriented to person, place, and time.      Motor: No abnormal muscle tone.   Psychiatric:         Mood and Affect: Mood normal.         Behavior: Behavior normal.                Significant Labs: All pertinent labs within the past 24 hours have been reviewed.  CBC:   Recent Labs   Lab 02/17/24  0550 02/18/24  0457   WBC 9.02 9.89   HGB 8.9* 9.1*   HCT 27.5* 28.2*    380       CMP:   Recent Labs   Lab 02/17/24  0550 02/18/24  0457    136   K 4.0 3.8    106   CO2 23 25   GLU 87 80   BUN 15 11   CREATININE 0.8 0.7   CALCIUM 7.6* 7.7*   PROT 4.9* 5.1*   ALBUMIN 1.3* 1.3*   BILITOT 0.2 0.3   ALKPHOS 108 118   AST 17 18   ALT 6* 6*   ANIONGAP 6* 5*         Significant Imaging: I have reviewed all pertinent imaging results/findings within the past 24 hours.

## 2024-02-19 LAB
ALBUMIN SERPL BCP-MCNC: 1.3 G/DL (ref 3.5–5.2)
ALP SERPL-CCNC: 110 U/L (ref 55–135)
ALT SERPL W/O P-5'-P-CCNC: 7 U/L (ref 10–44)
ANION GAP SERPL CALC-SCNC: 8 MMOL/L (ref 8–16)
AST SERPL-CCNC: 19 U/L (ref 10–40)
BACTERIA SPEC AEROBE CULT: NO GROWTH
BASOPHILS # BLD AUTO: 0.05 K/UL (ref 0–0.2)
BASOPHILS NFR BLD: 0.5 % (ref 0–1.9)
BILIRUB SERPL-MCNC: 0.2 MG/DL (ref 0.1–1)
BUN SERPL-MCNC: 8 MG/DL (ref 8–23)
CALCIUM SERPL-MCNC: 7.2 MG/DL (ref 8.7–10.5)
CHLORIDE SERPL-SCNC: 103 MMOL/L (ref 95–110)
CO2 SERPL-SCNC: 23 MMOL/L (ref 23–29)
CREAT SERPL-MCNC: 0.6 MG/DL (ref 0.5–1.4)
CRP SERPL-MCNC: 81.2 MG/L (ref 0–8.2)
DIFFERENTIAL METHOD BLD: ABNORMAL
EOSINOPHIL # BLD AUTO: 0.6 K/UL (ref 0–0.5)
EOSINOPHIL NFR BLD: 6.9 % (ref 0–8)
ERYTHROCYTE [DISTWIDTH] IN BLOOD BY AUTOMATED COUNT: 17.1 % (ref 11.5–14.5)
EST. GFR  (NO RACE VARIABLE): >60 ML/MIN/1.73 M^2
FUNGUS SPEC CULT: NORMAL
FUNGUS SPEC CULT: NORMAL
GLUCOSE SERPL-MCNC: 77 MG/DL (ref 70–110)
HCT VFR BLD AUTO: 27.2 % (ref 37–48.5)
HGB BLD-MCNC: 8.6 G/DL (ref 12–16)
IMM GRANULOCYTES # BLD AUTO: 0.06 K/UL (ref 0–0.04)
IMM GRANULOCYTES NFR BLD AUTO: 0.7 % (ref 0–0.5)
LYMPHOCYTES # BLD AUTO: 1.3 K/UL (ref 1–4.8)
LYMPHOCYTES NFR BLD: 14.5 % (ref 18–48)
MAGNESIUM SERPL-MCNC: 1.6 MG/DL (ref 1.6–2.6)
MCH RBC QN AUTO: 29.5 PG (ref 27–31)
MCHC RBC AUTO-ENTMCNC: 31.6 G/DL (ref 32–36)
MCV RBC AUTO: 93 FL (ref 82–98)
MONOCYTES # BLD AUTO: 0.7 K/UL (ref 0.3–1)
MONOCYTES NFR BLD: 7.8 % (ref 4–15)
NEUTROPHILS # BLD AUTO: 6.4 K/UL (ref 1.8–7.7)
NEUTROPHILS NFR BLD: 69.6 % (ref 38–73)
NRBC BLD-RTO: 0 /100 WBC
PHOSPHATE SERPL-MCNC: 2.9 MG/DL (ref 2.7–4.5)
PLATELET # BLD AUTO: 396 K/UL (ref 150–450)
PMV BLD AUTO: 9.8 FL (ref 9.2–12.9)
POTASSIUM SERPL-SCNC: 3.7 MMOL/L (ref 3.5–5.1)
PROT SERPL-MCNC: 5 G/DL (ref 6–8.4)
RBC # BLD AUTO: 2.92 M/UL (ref 4–5.4)
SODIUM SERPL-SCNC: 134 MMOL/L (ref 136–145)
WBC # BLD AUTO: 9.21 K/UL (ref 3.9–12.7)

## 2024-02-19 PROCEDURE — 97530 THERAPEUTIC ACTIVITIES: CPT | Mod: CQ

## 2024-02-19 PROCEDURE — 84100 ASSAY OF PHOSPHORUS: CPT | Performed by: HOSPITALIST

## 2024-02-19 PROCEDURE — 25000003 PHARM REV CODE 250: Performed by: HOSPITALIST

## 2024-02-19 PROCEDURE — 25000003 PHARM REV CODE 250

## 2024-02-19 PROCEDURE — 80053 COMPREHEN METABOLIC PANEL: CPT

## 2024-02-19 PROCEDURE — 97535 SELF CARE MNGMENT TRAINING: CPT

## 2024-02-19 PROCEDURE — 85025 COMPLETE CBC W/AUTO DIFF WBC: CPT

## 2024-02-19 PROCEDURE — 83735 ASSAY OF MAGNESIUM: CPT | Performed by: HOSPITALIST

## 2024-02-19 PROCEDURE — 86140 C-REACTIVE PROTEIN: CPT

## 2024-02-19 PROCEDURE — 63600175 PHARM REV CODE 636 W HCPCS

## 2024-02-19 PROCEDURE — 11000001 HC ACUTE MED/SURG PRIVATE ROOM

## 2024-02-19 RX ORDER — ERGOCALCIFEROL 1.25 MG/1
50000 CAPSULE ORAL
Qty: 15 CAPSULE | Refills: 2 | Status: SHIPPED | OUTPATIENT
Start: 2024-02-22

## 2024-02-19 RX ORDER — CALCIUM CARBONATE 200(500)MG
1000 TABLET,CHEWABLE ORAL DAILY
Qty: 60 TABLET | Refills: 11 | Status: SHIPPED | OUTPATIENT
Start: 2024-02-19 | End: 2025-02-18

## 2024-02-19 RX ORDER — PREGABALIN 200 MG/1
200 CAPSULE ORAL 3 TIMES DAILY
Qty: 90 CAPSULE | Refills: 0 | Status: SHIPPED | OUTPATIENT
Start: 2024-02-19 | End: 2024-08-19

## 2024-02-19 RX ORDER — POLYETHYLENE GLYCOL 3350 17 G/17G
17 POWDER, FOR SOLUTION ORAL DAILY
Qty: 510 G | Refills: 1 | Status: SHIPPED | OUTPATIENT
Start: 2024-02-19

## 2024-02-19 RX ORDER — OXYCODONE HYDROCHLORIDE 10 MG/1
TABLET ORAL
Qty: 42 TABLET | Refills: 0 | Status: SHIPPED | OUTPATIENT
Start: 2024-02-19

## 2024-02-19 RX ADMIN — METOPROLOL TARTRATE 50 MG: 50 TABLET, FILM COATED ORAL at 08:02

## 2024-02-19 RX ADMIN — APIXABAN 5 MG: 5 TABLET, FILM COATED ORAL at 08:02

## 2024-02-19 RX ADMIN — POTASSIUM & SODIUM PHOSPHATES POWDER PACK 280-160-250 MG 2 PACKET: 280-160-250 PACK at 08:02

## 2024-02-19 RX ADMIN — NYSTATIN: 100000 POWDER TOPICAL at 08:02

## 2024-02-19 RX ADMIN — OXYCODONE HYDROCHLORIDE 10 MG: 10 TABLET ORAL at 03:02

## 2024-02-19 RX ADMIN — PREGABALIN 200 MG: 150 CAPSULE ORAL at 08:02

## 2024-02-19 RX ADMIN — MORPHINE SULFATE 2 MG: 2 INJECTION, SOLUTION INTRAMUSCULAR; INTRAVENOUS at 05:02

## 2024-02-19 RX ADMIN — CYCLOBENZAPRINE 10 MG: 10 TABLET, FILM COATED ORAL at 03:02

## 2024-02-19 RX ADMIN — PREGABALIN 200 MG: 150 CAPSULE ORAL at 03:02

## 2024-02-19 RX ADMIN — DAPTOMYCIN 675 MG: 350 INJECTION, POWDER, LYOPHILIZED, FOR SOLUTION INTRAVENOUS at 12:02

## 2024-02-19 RX ADMIN — CYCLOBENZAPRINE 10 MG: 10 TABLET, FILM COATED ORAL at 10:02

## 2024-02-19 RX ADMIN — DOCUSATE SODIUM 100 MG: 100 CAPSULE, LIQUID FILLED ORAL at 08:02

## 2024-02-19 RX ADMIN — AMLODIPINE BESYLATE 5 MG: 5 TABLET ORAL at 08:02

## 2024-02-19 RX ADMIN — CHOLECALCIFEROL TAB 25 MCG (1000 UNIT) 1000 UNITS: 25 TAB at 08:02

## 2024-02-19 RX ADMIN — ESCITALOPRAM OXALATE 20 MG: 20 TABLET ORAL at 08:02

## 2024-02-19 RX ADMIN — CALCIUM CARBONATE (ANTACID) CHEW TAB 500 MG 1000 MG: 500 CHEW TAB at 08:02

## 2024-02-19 RX ADMIN — POTASSIUM & SODIUM PHOSPHATES POWDER PACK 280-160-250 MG 2 PACKET: 280-160-250 PACK at 03:02

## 2024-02-19 RX ADMIN — POTASSIUM & SODIUM PHOSPHATES POWDER PACK 280-160-250 MG 2 PACKET: 280-160-250 PACK at 10:02

## 2024-02-19 RX ADMIN — POTASSIUM & SODIUM PHOSPHATES POWDER PACK 280-160-250 MG 2 PACKET: 280-160-250 PACK at 06:02

## 2024-02-19 RX ADMIN — OXYCODONE HYDROCHLORIDE 10 MG: 10 TABLET ORAL at 10:02

## 2024-02-19 RX ADMIN — OXYCODONE HYDROCHLORIDE 10 MG: 10 TABLET ORAL at 06:02

## 2024-02-19 NOTE — PT/OT/SLP PROGRESS
"Physical Therapy Treatment    Patient Name:  Froilan Ray   MRN:  [id number]    Recommendations:     Discharge Recommendations: Moderate Intensity Therapy  Discharge Equipment Recommendations: none  Barriers to discharge: Decreased caregiver support and increased level of assistance    Assessment:     Froilan Ray is a 78 y.o. female admitted with a medical diagnosis of Status post Girdlestone procedure.  She presents with the following impairments/functional limitations: weakness, impaired endurance, impaired self care skills, impaired functional mobility, gait instability, impaired balance, decreased lower extremity function, decreased safety awareness, pain, decreased ROM, impaired skin, edema, orthopedic precautions. Session limited by pain and strength.    Rehab Prognosis: Fair; patient would benefit from acute skilled PT services to address these deficits and reach maximum level of function.    Recent Surgery: Procedure(s) (LRB):  Revision hip antibiotic spacer head exchange, left, lateral, peg board, depuy osteomed in room, vanc, ancef, txa, (Left)  IRRIGATION AND DEBRIDEMENT, LOWER EXTREMITY (Left)  FLAP GRAFT, LOCAL (Left) 4 Days Post-Op    Plan:     During this hospitalization, patient to be seen 4 x/week to address the identified rehab impairments via gait training, therapeutic activities, therapeutic exercises, neuromuscular re-education and progress toward the following goals:    Plan of Care Expires:  03/17/24    Subjective     Chief Complaint: "I just want to be left alone and die" "I am never going to walk again"  Patient/Family Comments/goals: none verbalized  Pain/Comfort:  Pain Rating 1: 6/10  Location - Side 1: Left  Location 1: hip  Pain Addressed 1: Reposition, Distraction, Nurse notified      Objective:     Communicated with RN prior to session.  Patient found HOB elevated with FCD, BEULAH drain upon PT entry to room.     General Precautions: Standard, fall  Orthopedic Precautions: LLE " toe touch weight bearing, LLE posterior precautions  Braces: N/A  Respiratory Status: Room air     Functional Mobility:  Bed Mobility:     Supine to Sit: moderate assistance and of 2 persons  Sit to Supine: moderate assistance and of 2 persons  Transfers:     Sit to Stand:  moderate assistance with rolling walker      AM-PAC 6 CLICK MOBILITY  Turning over in bed (including adjusting bedclothes, sheets and blankets)?: 2  Sitting down on and standing up from a chair with arms (e.g., wheelchair, bedside commode, etc.): 2  Moving from lying on back to sitting on the side of the bed?: 2  Moving to and from a bed to a chair (including a wheelchair)?: 1  Need to walk in hospital room?: 1  Climbing 3-5 steps with a railing?: 1  Basic Mobility Total Score: 9       Treatment & Education:  Pt unable to take steps due to pain and standing tolerance.   Education provided on weight bearing status.  Nursing made aware of pt statements to PT/OT.  Bedside table in front of patient and area set up for function, convenience, and safety. RN aware of patient's mobility needs and status. Questions/concerns addressed within PTA scope of practice; patient  with no further questions. Time was provided for active listening, discussion of health disposition, and discussion of safe discharge.    Patient left HOB elevated with all lines intact, call button in reach, and nursing notified..    GOALS:   Multidisciplinary Problems       Physical Therapy Goals          Problem: Physical Therapy    Goal Priority Disciplines Outcome Goal Variances Interventions   Physical Therapy Goal     PT, PT/OT Ongoing, Progressing     Description: PT goals until 2/30/24    1. Pt supine to sit with minimal assist-not met  2. Pt sit to supine with moderate assist-not met  3. Pt sit to stand with RW with TTWB L LE with moderate assist-not met  4. Pt to perform gait 5ft with RW with TTWB L LE with moderate assist.-not met  5. Pt to transfer bed to/from bedside chair  with TTWB L LE with RW with moderate assist.-not met  6. Pt to propel w/c 50ft with B UE on level surface with CGA.-not met  7. Pt to perform B LE exs in sitting or supine x 10 reps to strengthen B LE to improve functional mobility.-not met                        Time Tracking:     PT Received On: 02/19/24  PT Start Time: 0956     PT Stop Time: 1019  PT Total Time (min): 23 min     Billable Minutes: Therapeutic Activity 23    Treatment Type: Treatment  PT/PTA: PTA     Number of PTA visits since last PT visit: 1 02/19/2024

## 2024-02-19 NOTE — PLAN OF CARE
Spoke with patient, her spouse and son Aristeo regarding d/c plan. Team recommending Skilled nursing care. Patient and family refusing SNF placement. Discussed in detail that patient has only taken a few steps and requires max assist of 2 to do so.  maintains that he has been caring for patient up to this point and despite recommendations of medical team and PT/OT he and son will take her home once she is medically stable. Will continue services with [facility] and [facility] for IVAB.     UPDATE 205PM-[facility] liaison reports agency will not accept patient back for care as they are refusing recommended placement and agency feels it would be a liability to continue to care for her at home. Blast referrals sent at this time. Current BAILEY is 2/20/24      Kaye SHEFFIELD  Case Management  [facility]  [phone]

## 2024-02-19 NOTE — PT/OT/SLP PROGRESS
"Occupational Therapy   Treatment    Name: Froilan Ray  MRN: [id number]  Admitting Diagnosis:  Status post Girdlestone procedure  4 Days Post-Op    Recommendations:     Discharge Recommendations: Moderate Intensity Therapy  Discharge Equipment Recommendations:  hospital bed, lift device  Barriers to discharge:  Other (Comment) (increased skilled (A) required)    Assessment:     Froilan Ray is a 78 y.o. female with a medical diagnosis of Status post Girdlestone procedure.  She presents with the following performance deficits affecting function are weakness, impaired endurance, impaired self care skills, impaired functional mobility, gait instability, impaired balance, decreased lower extremity function, decreased safety awareness, pain, decreased ROM, orthopedic precautions, impaired joint extensibility, edema, impaired skin. Pt tolerated session today's session poorly, which focused on improving functional mobility tolerance. However, session limited by pt's screaming out in pain. RN aware. She was able to sit EOB well and perform grooming  and performed sit to stand transfer with assistance and RW. Pt apprehensive to education provided by therapist(s) on importance of OOB mobility and proper handling/positioning of LLE with bed mobility to avoid pain exacerbation.  Pt  would benefit from continued skilled acute OT services in order to maximize (I) and with ADLs and functional mobility to ensure safe return to PLOF in the least restrictive environment. OT recommending moderate once pt is medically appropriate for d/c.       Rehab Prognosis:  Fair; patient would benefit from acute skilled OT services to address these deficits and reach maximum level of function.       Plan:     Patient to be seen 3 x/week to address the above listed problems via self-care/home management, therapeutic activities, therapeutic exercises  Plan of Care Expires: 03/17/24  Plan of Care Reviewed with: patient    Subjective   "I'm " "never going to walk again" "I'm so down and disgusted"   Chief Complaint: Pain   Patient/Family Comments/goals: pain relief   Pain/Comfort:  Pain Rating 1: 6/10  Location - Side 1: Left  Location - Orientation 1: generalized  Location 1: hip  Pain Addressed 1: Reposition, Distraction, Cessation of Activity, Nurse notified  Pain Rating Post-Intervention 1: 8/10    Objective:     Communicated with: RN prior to session.  Patient found HOB elevated with FCD, BEULAH drain upon OT entry to room.    General Precautions: Standard, fall    Orthopedic Precautions:LLE toe touch weight bearing, LLE posterior precautions  Braces: N/A  Respiratory Status: Room air     Occupational Performance:     Bed Mobility:    Patient completed Supine to Sit with moderate assistance and 2 persons  Patient completed Sit to Supine with moderate assistance and 2 persons     Functional Mobility/Transfers:  Patient completed Sit <> Stand Transfer with moderate assistance  with  rolling walker   Functional Mobility: Further mobility deferred 2/2 poor standing tolerance and inability to sustain TTWB precautions.     Activities of Daily Living:  Grooming: stand by assistance to perform facial hygiene while sitting EOB   Lower Body Dressing: maximal assistance to don socks      [facility] 6 Click ADL: 16    Treatment & Education:   Educated pt on hospital's services such as a , pt not interested.   Pt  educated on:   Role of OT, POC, and d/c planning.   Importance of re-donning FCDs in bed, however refused despite education provided   TTWB and LLE posterior hip precautions   Safe transfer techniques and proper body mechanics for fall prevention and improved independence with functional transfers   Importance of OOB activities to increase endurance and tolerance for increased participation in daily ADLs.   Utilizing the call bell to request for assistance with all functional mobility to ensure safety during hospital stay.      Pt verbalized " understanding and all questions were addressed within the scope of OT.    Patient left HOB elevated with all lines intact, call button in reach, and RN notified    GOALS:   Multidisciplinary Problems       Occupational Therapy Goals          Problem: Occupational Therapy    Goal Priority Disciplines Outcome Interventions   Occupational Therapy Goal     OT, PT/OT Ongoing, Progressing    Description: Goals to be met by: 3/17/24     Patient will increase functional independence with ADLs by performing:    LE Dressing with Moderate Assistance.  Grooming while standing at sink with Stand-by Assistance.  Toileting from bedside commode with Moderate Assistance for hygiene and clothing management.   Toilet transfer to bedside commode with Moderate Assistance.                         Time Tracking:     OT Date of Treatment: 02/19/24  OT Start Time: 0956  OT Stop Time: 1020  OT Total Time (min): 24 min    Billable Minutes:Self Care/Home Management 24    OT/PREETI: OT          2/19/2024

## 2024-02-19 NOTE — SUBJECTIVE & OBJECTIVE
Interval history- she was eating breakfast this morning and reports pain is there as she expects. Labs stable with hg 8.6 and crp 80s. Discussed at length the importance of SNF and multiple teams feel this including ortho, thearpy , ID. She reports that family is not amenable. Talked for a long time about this. cM talked to son and  and her and they further refused SNF care. High risk of worsening issues without going to SNF and patient aware. Reached out to ortho this Am regarding drain plans as drain still with output, this is only thing waiting for to figure out plan for regarding discharge as med stbenedicto otherwise        Review of patient's allergies indicates:  No Known Allergies    No current facility-administered medications on file prior to encounter.     Current Outpatient Medications on File Prior to Encounter   Medication Sig    amlodipine-benazepril 5-20 mg (LOTREL) 5-20 mg per capsule Take 1 capsule by mouth once daily.    apixaban (ELIQUIS) 5 mg Tab Take 1 tablet (5 mg total) by mouth 2 (two) times daily.    cyclobenzaprine (FLEXERIL) 10 MG tablet Take 1 tablet (10 mg total) by mouth 3 (three) times daily as needed for Muscle spasms.    docusate sodium (COLACE) 100 MG capsule Take 1 capsule (100 mg total) by mouth 2 (two) times daily.    EScitalopram oxalate (LEXAPRO) 20 MG tablet Take 1 tablet (20 mg total) by mouth every evening.    metoprolol tartrate (LOPRESSOR) 50 MG tablet Take 1 tablet (50 mg total) by mouth 2 (two) times daily.    nystatin (MYCOSTATIN) powder Apply to affected area 3 times daily    omeprazole (PRILOSEC) 40 MG capsule Take 1 capsule (40 mg total) by mouth every morning.    [DISCONTINUED] pantoprazole (PROTONIX) 40 MG tablet Take 1 tablet (40 mg total) by mouth 2 (two) times daily.     Family History       Problem Relation (Age of Onset)    COPD Brother    Coronary artery disease Father    Depression Father    Diabetes Mother, Father, Sister, Brother    Heart disease Father     Hypertension Mother, Father    Irritable bowel syndrome Mother          Tobacco Use    Smoking status: Never    Smokeless tobacco: Never   Substance and Sexual Activity    Alcohol use: No    Drug use: No    Sexual activity: Not Currently     Review of Systems   All other systems reviewed and are negative.    Objective:     Vital Signs (Most Recent):  Temp: 98 °F (36.7 °C) (02/19/24 1149)  Pulse: 66 (02/19/24 1149)  Resp: 20 (02/19/24 1149)  BP: (!) 145/68 (02/19/24 1149)  SpO2: 95 % (02/19/24 1149) Vital Signs (24h Range):  Temp:  [97.9 °F (36.6 °C)-99.4 °F (37.4 °C)] 98 °F (36.7 °C)  Pulse:  [66-80] 66  Resp:  [16-20] 20  SpO2:  [95 %-98 %] 95 %  BP: (137-163)/(68-79) 145/68     Weight: 67.9 kg (149 lb 11.1 oz)  Body mass index is 24.16 kg/m².     Physical Exam  Vitals and nursing note reviewed.   Constitutional:       General: She is not in acute distress.     Appearance: She is well-developed. She is not diaphoretic.      Comments: Awake on exam. Mildly pale   HENT:      Head: Normocephalic and atraumatic.   Eyes:      General: No scleral icterus.     Conjunctiva/sclera: Conjunctivae normal.      Comments: Disconjugate gaze (baseline)   Neck:      Vascular: No JVD.   Cardiovascular:      Rate and Rhythm: Normal rate.   Pulmonary:      Effort: Pulmonary effort is normal. No respiratory distress.      Breath sounds: No wheezing.   Musculoskeletal:      Right lower leg: Edema present.      Left lower leg: Edema present.      Comments: Bandages to LLE and drain with serosanginuous exudate. PICC in RUE    Skin:     Coloration: Skin is not jaundiced or pale.   Neurological:      Mental Status: She is alert and oriented to person, place, and time.      Motor: No abnormal muscle tone.   Psychiatric:         Mood and Affect: Mood normal.         Behavior: Behavior normal.                Significant Labs: All pertinent labs within the past 24 hours have been reviewed.  CBC:   Recent Labs   Lab 02/18/24  8353  02/19/24  0427   WBC 9.89 9.21   HGB 9.1* 8.6*   HCT 28.2* 27.2*    396       CMP:   Recent Labs   Lab 02/18/24  0457 02/19/24 0427    134*   K 3.8 3.7    103   CO2 25 23   GLU 80 77   BUN 11 8   CREATININE 0.7 0.6   CALCIUM 7.7* 7.2*   PROT 5.1* 5.0*   ALBUMIN 1.3* 1.3*   BILITOT 0.3 0.2   ALKPHOS 118 110   AST 18 19   ALT 6* 7*   ANIONGAP 5* 8         Significant Imaging: I have reviewed all pertinent imaging results/findings within the past 24 hours.

## 2024-02-19 NOTE — PROGRESS NOTES
"[facility]  Progress Note    Patient Name: Froilan Ray  MRN: [id number]  Patient Class: IP- Inpatient   Admission Date: 2/14/2024  Length of Stay: 5 days  Attending Physician: Mayte Rogers MD  Primary Care Provider: Alphonse Boothe III, MD        Subjective:     Principal Problem:Status post Girdlestone procedure        HPI:  Froilan Ray is a 78 y.o. female with history of L hip surgery, HTN, HLD, CHF, chronic pain on opioids, peripheral neuropathy, and recent admission for MRSA septic arthritis requiring replacement of L hip hardware who was transferred from [facility] for L prosthetic hip fracture.     Patient reports that she was doing relatively well on the morning of presentation, able to ambulate with her walker without major issues. Unfortunately, while walking, her L leg "slipped," causing her to fall to the ground. She immediately noted L hip pain and inability to ambulate. She denies any pre-syncopal symptoms.She presented to the ED, where L hip fx was noted, therefore transferred here for ortho evaluation.     Overview/Hospital Course:  No notes on file    Interval history- she was eating breakfast this morning and reports pain is there as she expects. Labs stable with hg 8.6 and crp 80s. Discussed at length the importance of SNF and multiple teams feel this including ortho, thearpy , ID. She reports that family is not amenable. Talked for a long time about this. cM talked to son and  and her and they further refused SNF care. High risk of worsening issues without going to SNF and patient aware. Reached out to ortho this Am regarding drain plans as drain still with output, this is only thing waiting for to figure out plan for regarding discharge as med stbale otherwise        Review of patient's allergies indicates:  No Known Allergies    No current facility-administered medications on file prior to encounter.     Current Outpatient Medications on File Prior " to Encounter   Medication Sig    amlodipine-benazepril 5-20 mg (LOTREL) 5-20 mg per capsule Take 1 capsule by mouth once daily.    apixaban (ELIQUIS) 5 mg Tab Take 1 tablet (5 mg total) by mouth 2 (two) times daily.    cyclobenzaprine (FLEXERIL) 10 MG tablet Take 1 tablet (10 mg total) by mouth 3 (three) times daily as needed for Muscle spasms.    docusate sodium (COLACE) 100 MG capsule Take 1 capsule (100 mg total) by mouth 2 (two) times daily.    EScitalopram oxalate (LEXAPRO) 20 MG tablet Take 1 tablet (20 mg total) by mouth every evening.    metoprolol tartrate (LOPRESSOR) 50 MG tablet Take 1 tablet (50 mg total) by mouth 2 (two) times daily.    nystatin (MYCOSTATIN) powder Apply to affected area 3 times daily    omeprazole (PRILOSEC) 40 MG capsule Take 1 capsule (40 mg total) by mouth every morning.    [DISCONTINUED] pantoprazole (PROTONIX) 40 MG tablet Take 1 tablet (40 mg total) by mouth 2 (two) times daily.     Family History       Problem Relation (Age of Onset)    COPD Brother    Coronary artery disease Father    Depression Father    Diabetes Mother, Father, Sister, Brother    Heart disease Father    Hypertension Mother, Father    Irritable bowel syndrome Mother          Tobacco Use    Smoking status: Never    Smokeless tobacco: Never   Substance and Sexual Activity    Alcohol use: No    Drug use: No    Sexual activity: Not Currently     Review of Systems   All other systems reviewed and are negative.    Objective:     Vital Signs (Most Recent):  Temp: 98 °F (36.7 °C) (02/19/24 1149)  Pulse: 66 (02/19/24 1149)  Resp: 20 (02/19/24 1149)  BP: (!) 145/68 (02/19/24 1149)  SpO2: 95 % (02/19/24 1149) Vital Signs (24h Range):  Temp:  [97.9 °F (36.6 °C)-99.4 °F (37.4 °C)] 98 °F (36.7 °C)  Pulse:  [66-80] 66  Resp:  [16-20] 20  SpO2:  [95 %-98 %] 95 %  BP: (137-163)/(68-79) 145/68     Weight: 67.9 kg (149 lb 11.1 oz)  Body mass index is 24.16 kg/m².     Physical Exam  Vitals and nursing note reviewed.    Constitutional:       General: She is not in acute distress.     Appearance: She is well-developed. She is not diaphoretic.      Comments: Awake on exam. Mildly pale   HENT:      Head: Normocephalic and atraumatic.   Eyes:      General: No scleral icterus.     Conjunctiva/sclera: Conjunctivae normal.      Comments: Disconjugate gaze (baseline)   Neck:      Vascular: No JVD.   Cardiovascular:      Rate and Rhythm: Normal rate.   Pulmonary:      Effort: Pulmonary effort is normal. No respiratory distress.      Breath sounds: No wheezing.   Musculoskeletal:      Right lower leg: Edema present.      Left lower leg: Edema present.      Comments: Bandages to LLE and drain with serosanginuous exudate. PICC in RUE    Skin:     Coloration: Skin is not jaundiced or pale.   Neurological:      Mental Status: She is alert and oriented to person, place, and time.      Motor: No abnormal muscle tone.   Psychiatric:         Mood and Affect: Mood normal.         Behavior: Behavior normal.                Significant Labs: All pertinent labs within the past 24 hours have been reviewed.  CBC:   Recent Labs   Lab 02/18/24 0457 02/19/24 0427   WBC 9.89 9.21   HGB 9.1* 8.6*   HCT 28.2* 27.2*    396       CMP:   Recent Labs   Lab 02/18/24 0457 02/19/24  0427    134*   K 3.8 3.7    103   CO2 25 23   GLU 80 77   BUN 11 8   CREATININE 0.7 0.6   CALCIUM 7.7* 7.2*   PROT 5.1* 5.0*   ALBUMIN 1.3* 1.3*   BILITOT 0.3 0.2   ALKPHOS 118 110   AST 18 19   ALT 6* 7*   ANIONGAP 5* 8         Significant Imaging: I have reviewed all pertinent imaging results/findings within the past 24 hours.    Assessment/Plan:      * Antibiotic Spacer failure status post Girdlestone procedure  See fracture/ infection      Hypophosphatemia  Patient has Abnormal Phosphorus: hypophosphatemia. Will continue to monitor electrolytes closely. Will replace the affected electrolytes and repeat labs to be done after interventions completed. The patient's  "phosphorus results have been reviewed and are listed below.  Recent Labs   Lab 02/17/24  0550   PHOS 1.9*        Bacteriuria  K pneumo in urine cx, 3 days rocephin per ID recs      Vitamin D deficiency  Low at 9 and ergo started      Hypocalcemia  Patient has hypocalcemia due to Critical Illness which is currently uncontrolled, and has been confirmed with ionized and/or corrected calcium. Will replace calcium and monitor electrolytes closely. The latest calcium labs have been reviewed and are listed below.  Recent Labs   Lab 02/16/24  0410   CALCIUM 7.6*       No results for input(s): "CAION" in the last 24 hours.  -low at 8.5 corrected for albuminin the 1/s and replacemet started on 2/15 with improvements        Urinary retention  Had urinary retention during recent admission, discharged with Barrera and Urology follow-up. Will monitor output and re-attempt voiding trials after surgery. No barrera in place post op now         Staphylococcal arthritis of left hip  Recent MRSA arthritis during recent admission, planned for Daptomycin daily until 2/29 per ID recs. Will continue this now per ID recs, likely will need to restart timeline for 6 weeks per ID notes and rec end date of 3/28 with weekly labs. They rec going to SNf for antibiotics and will discuss with patient family further as they prefer HH per cm notes Picc in place to RUE      Chronic heart failure with preserved ejection fraction  Recovered EF on recent TTE. Will monitor volume status and diurese as needed.       Hypokalemia  Patient has hypokalemia which is Acute and currently controlled. Most recent potassium levels reviewed-   Lab Results   Component Value Date    K 4.4 02/16/2024   . Will continue potassium replacement per protocol and recheck repeat levels after replacement completed.     Paroxysmal atrial fibrillation  Currently controlled and NSR on admit Continue home Metoprolol. Eliquis resumed post op    Iron deficiency anemia  Chronically severe, " and stable around baseline. With baseline hg 7-9, tranfusions on 2/15 for acute blood loss anemia.    Peripheral neuropathy  Continue Lyrica.     Essential hypertension  Hold bp meds 2/16 for lower bps post op  BP uptick on 2/18 so add back norvasc and lower dose metoprolol    Femur fracture, left  Recent revision of L hip replacement due to septic arthritis, and presents with pain following a fall to the left hip. CT shows acute fracture involving screw at junction of left femoral head and neck components of left hip hemiarthroplasty, with superior displacement of the distal fragment. Ortho consulted and had revision of spacer on 2/15, Revision placement of articulating cement coated antibiotic spacer left hip, Excisional and non excisional debridement and irrigation left hip, skin, subcutaneous tissue fascia and muscle  -TTWB for 6 weeks to LLE with posterior hip precautions per ortho, will need SNF as failed home treatments but fam hesitant per notes, will discuss further with them.  She reports is amenable but states  wants her home. Discussed more on 2/18 will f/u with CM and family tomorrow more and extensive discussion 2/19 and after she talked with family she is not amenable and CM discussed with family and not amenable either  -pain control with multimodals  -hg stable at 10--8.9 post op now, bowel regimen  -CX NG so far, will need 6 weeks antibiotics restarted per ID, on dapto now  -drain in place post op       Chronic pain with uncomplicated opioid dependence  Continue home regimen, verified by PDMP with breakthrough meds available given acute fracture.         VTE Risk Mitigation (From admission, onward)           Ordered     apixaban tablet 5 mg  2 times daily         02/18/24 1414     Place sequential compression device  Until discontinued         02/15/24 1017     IP VTE HIGH RISK PATIENT  Once         02/15/24 1017     Place sequential compression device  Until discontinued         02/14/24  2333                    Discharge Planning   BAILEY: 2/19/2024     Code Status: Full Code   Is the patient medically ready for discharge?: Yes    Reason for patient still in hospital (select all that apply): Patient trending condition  Discharge Plan A: Home with family, Home Health                  Mayte Rogers MD  Department of Hospital Medicine   [facility] - Surgery

## 2024-02-19 NOTE — ASSESSMENT & PLAN NOTE
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 CT shows acute fracture involving screw at junction of left femoral head and neck components of left hip hemiarthroplasty, with superior displacement of the distal fragment. Ortho consulted and had revision of spacer on 2/15, Revision placement of articulating cement coated antibiotic spacer left hip, Excisional and non excisional debridement and irrigation left hip, skin, subcutaneous tissue fascia and muscle  -TTWB for 6 weeks to LLE with posterior hip precautions per ortho, will need SNF as failed home treatments but fam hesitant per notes, will discuss further with them.  She reports is amenable but states  wants her home. Discussed more on 2/18 will f/u with CM and family tomorrow more and extensive discussion 2/19 and after she talked with family she is not amenable and CM discussed with family and not amenable either  -pain control with multimodals  -hg stable at 10--8.9 post op now, bowel regimen  -CX NG so far, will need 6 weeks antibiotics restarted per ID, on dapto now  -drain in place post op

## 2024-02-19 NOTE — ASSESSMENT & PLAN NOTE
Had urinary retention during recent admission, discharged with Barrera and Urology follow-up. Will monitor output and re-attempt voiding trials after surgery. No barrera in place post op now

## 2024-02-20 VITALS
HEART RATE: 69 BPM | TEMPERATURE: 98 F | RESPIRATION RATE: 18 BRPM | HEIGHT: 66 IN | OXYGEN SATURATION: 97 % | BODY MASS INDEX: 24.06 KG/M2 | WEIGHT: 149.69 LBS | DIASTOLIC BLOOD PRESSURE: 58 MMHG | SYSTOLIC BLOOD PRESSURE: 122 MMHG

## 2024-02-20 LAB
ALBUMIN SERPL BCP-MCNC: 1.4 G/DL (ref 3.5–5.2)
ALP SERPL-CCNC: 99 U/L (ref 55–135)
ALT SERPL W/O P-5'-P-CCNC: 6 U/L (ref 10–44)
ANION GAP SERPL CALC-SCNC: 5 MMOL/L (ref 8–16)
AST SERPL-CCNC: 14 U/L (ref 10–40)
BACTERIA BLD CULT: NORMAL
BACTERIA BLD CULT: NORMAL
BASOPHILS # BLD AUTO: 0.06 K/UL (ref 0–0.2)
BASOPHILS NFR BLD: 0.7 % (ref 0–1.9)
BILIRUB SERPL-MCNC: 0.2 MG/DL (ref 0.1–1)
BUN SERPL-MCNC: 11 MG/DL (ref 8–23)
CALCIUM SERPL-MCNC: 7.5 MG/DL (ref 8.7–10.5)
CHLORIDE SERPL-SCNC: 107 MMOL/L (ref 95–110)
CO2 SERPL-SCNC: 24 MMOL/L (ref 23–29)
CREAT SERPL-MCNC: 0.6 MG/DL (ref 0.5–1.4)
CRP SERPL-MCNC: 59.1 MG/L (ref 0–8.2)
DIFFERENTIAL METHOD BLD: ABNORMAL
EOSINOPHIL # BLD AUTO: 0.6 K/UL (ref 0–0.5)
EOSINOPHIL NFR BLD: 7 % (ref 0–8)
ERYTHROCYTE [DISTWIDTH] IN BLOOD BY AUTOMATED COUNT: 16.9 % (ref 11.5–14.5)
EST. GFR  (NO RACE VARIABLE): >60 ML/MIN/1.73 M^2
GLUCOSE SERPL-MCNC: 94 MG/DL (ref 70–110)
HCT VFR BLD AUTO: 27.9 % (ref 37–48.5)
HGB BLD-MCNC: 8.9 G/DL (ref 12–16)
IMM GRANULOCYTES # BLD AUTO: 0.09 K/UL (ref 0–0.04)
IMM GRANULOCYTES NFR BLD AUTO: 1 % (ref 0–0.5)
LYMPHOCYTES # BLD AUTO: 1.2 K/UL (ref 1–4.8)
LYMPHOCYTES NFR BLD: 13.4 % (ref 18–48)
MAGNESIUM SERPL-MCNC: 1.7 MG/DL (ref 1.6–2.6)
MCH RBC QN AUTO: 29.6 PG (ref 27–31)
MCHC RBC AUTO-ENTMCNC: 31.9 G/DL (ref 32–36)
MCV RBC AUTO: 93 FL (ref 82–98)
MONOCYTES # BLD AUTO: 0.6 K/UL (ref 0.3–1)
MONOCYTES NFR BLD: 6.8 % (ref 4–15)
NEUTROPHILS # BLD AUTO: 6.5 K/UL (ref 1.8–7.7)
NEUTROPHILS NFR BLD: 71.1 % (ref 38–73)
NRBC BLD-RTO: 0 /100 WBC
PHOSPHATE SERPL-MCNC: 3 MG/DL (ref 2.7–4.5)
PLATELET # BLD AUTO: 388 K/UL (ref 150–450)
PMV BLD AUTO: 9 FL (ref 9.2–12.9)
POTASSIUM SERPL-SCNC: 4 MMOL/L (ref 3.5–5.1)
PROT SERPL-MCNC: 5.1 G/DL (ref 6–8.4)
RBC # BLD AUTO: 3.01 M/UL (ref 4–5.4)
SODIUM SERPL-SCNC: 136 MMOL/L (ref 136–145)
WBC # BLD AUTO: 9.09 K/UL (ref 3.9–12.7)

## 2024-02-20 PROCEDURE — 25000003 PHARM REV CODE 250: Performed by: HOSPITALIST

## 2024-02-20 PROCEDURE — 84100 ASSAY OF PHOSPHORUS: CPT | Performed by: HOSPITALIST

## 2024-02-20 PROCEDURE — 25000003 PHARM REV CODE 250

## 2024-02-20 PROCEDURE — 85025 COMPLETE CBC W/AUTO DIFF WBC: CPT

## 2024-02-20 PROCEDURE — 80053 COMPREHEN METABOLIC PANEL: CPT

## 2024-02-20 PROCEDURE — 86140 C-REACTIVE PROTEIN: CPT

## 2024-02-20 PROCEDURE — 83735 ASSAY OF MAGNESIUM: CPT | Performed by: HOSPITALIST

## 2024-02-20 PROCEDURE — 63600175 PHARM REV CODE 636 W HCPCS

## 2024-02-20 RX ADMIN — POTASSIUM & SODIUM PHOSPHATES POWDER PACK 280-160-250 MG 2 PACKET: 280-160-250 PACK at 05:02

## 2024-02-20 RX ADMIN — NYSTATIN: 100000 POWDER TOPICAL at 08:02

## 2024-02-20 RX ADMIN — POTASSIUM & SODIUM PHOSPHATES POWDER PACK 280-160-250 MG 2 PACKET: 280-160-250 PACK at 11:02

## 2024-02-20 RX ADMIN — POLYETHYLENE GLYCOL 3350 17 G: 17 POWDER, FOR SOLUTION ORAL at 08:02

## 2024-02-20 RX ADMIN — CALCIUM CARBONATE (ANTACID) CHEW TAB 500 MG 1000 MG: 500 CHEW TAB at 08:02

## 2024-02-20 RX ADMIN — AMLODIPINE BESYLATE 5 MG: 5 TABLET ORAL at 08:02

## 2024-02-20 RX ADMIN — CHOLECALCIFEROL TAB 25 MCG (1000 UNIT) 1000 UNITS: 25 TAB at 08:02

## 2024-02-20 RX ADMIN — METOPROLOL TARTRATE 50 MG: 50 TABLET, FILM COATED ORAL at 08:02

## 2024-02-20 RX ADMIN — CYCLOBENZAPRINE 10 MG: 10 TABLET, FILM COATED ORAL at 11:02

## 2024-02-20 RX ADMIN — OXYCODONE HYDROCHLORIDE 10 MG: 10 TABLET ORAL at 08:02

## 2024-02-20 RX ADMIN — DOCUSATE SODIUM 100 MG: 100 CAPSULE, LIQUID FILLED ORAL at 08:02

## 2024-02-20 RX ADMIN — DAPTOMYCIN 675 MG: 350 INJECTION, POWDER, LYOPHILIZED, FOR SOLUTION INTRAVENOUS at 02:02

## 2024-02-20 RX ADMIN — PREGABALIN 200 MG: 150 CAPSULE ORAL at 08:02

## 2024-02-20 RX ADMIN — APIXABAN 5 MG: 5 TABLET, FILM COATED ORAL at 08:02

## 2024-02-20 NOTE — PROGRESS NOTES
[facility] - [facility]  Orthopedics  Progress Note    Patient Name: Froilan Ray  MRN: [id number]  Admission Date: 2/14/2024  Hospital Length of Stay: 5 days  Attending Provider: Mayte Rogers MD  Primary Care Provider: Alphonse Boothe III, MD  Follow-up For: Procedure(s) (LRB):  Revision hip antibiotic spacer head exchange, left, lateral, peg board, depuy osteomed in room, vanc, ancef, txa, (Left)  IRRIGATION AND DEBRIDEMENT, LOWER EXTREMITY (Left)  FLAP GRAFT, LOCAL (Left)    Post-Operative Day: 4 Days Post-Op  Subjective:     Principal Problem:Status post Girdlestone procedure    Principal Orthopedic Problem: s/p left hip revision antibiotic spacer placement on 2/15    Interval History: NAEON. VSS. L hip pain controlled. Drain output 110cc/24 hr, ss. CRP up to 81.2 today from 80.1.  Hgb 8.6.     Review of patient's allergies indicates:  No Known Allergies    Current Facility-Administered Medications   Medication    amLODIPine tablet 5 mg    apixaban tablet 5 mg    bisacodyL suppository 10 mg    calcium carbonate 200 mg calcium (500 mg) chewable tablet 1,000 mg    cyclobenzaprine tablet 10 mg    DAPTOmycin (CUBICIN) 675 mg in sodium chloride 0.9% SolP 50 mL IVPB    docusate sodium capsule 100 mg    ergocalciferol capsule 50,000 Units    EScitalopram oxalate tablet 20 mg    melatonin tablet 6 mg    metoprolol tartrate (LOPRESSOR) tablet 50 mg    oxyCODONE immediate release tablet 5 mg    And    oxyCODONE immediate release tablet Tab 10 mg    And    morphine injection 2 mg    nystatin powder    ondansetron injection 4 mg    polyethylene glycol packet 17 g    potassium, sodium phosphates 280-160-250 mg packet 2 packet    pregabalin capsule 200 mg    sodium chloride 0.9% flush 10 mL    sodium chloride 0.9% flush 10 mL    vitamin D 1000 units tablet 1,000 Units     Objective:     Vital Signs (Most Recent):  Temp: 98 °F (36.7 °C) (02/19/24 1941)  Pulse: 69 (02/19/24 1941)  Resp: 18 (02/19/24 1941)  BP: 119/63  "(02/19/24 1941)  SpO2: 97 % (02/19/24 1941) Vital Signs (24h Range):  Temp:  [97.2 °F (36.2 °C)-99 °F (37.2 °C)] 98 °F (36.7 °C)  Pulse:  [66-73] 69  Resp:  [16-20] 18  SpO2:  [95 %-98 %] 97 %  BP: (119-155)/(63-86) 119/63     Weight: 67.9 kg (149 lb 11.1 oz)  Height: 5' 6" (167.6 cm)  Body mass index is 24.16 kg/m².      Intake/Output Summary (Last 24 hours) at 2/19/2024 2300  Last data filed at 2/19/2024 2000  Gross per 24 hour   Intake 440 ml   Output 1280 ml   Net -840 ml         Ortho/SPM Exam     Awake/alert/oriented x3, No acute distress, Afebrile, Vital signs stable  Good inspiratory effort with unlaboured breathing  Dressings c/d/i  NVI in operative limb     Significant Labs: All pertinent labs within the past 24 hours have been reviewed.    Significant Imaging: I have reviewed all pertinent imaging results/findings.  Assessment/Plan:     * Antibiotic Spacer failure status post Girdlestone procedure  Froilan Ray is a 78 y.o. female with PMH of MRSA bacteremia and left-sided hip septic arthritis status post Girdlestone placement with antibiotic spacer presenting with failure of the left-sided antibiotic spacer head dissociation after fall    Now s/p left hip revision antibiotic spacer on 2/15      Pain control: MMP  PT/OT: WBAT LLE  DVT PPx: eliquis 2.5 bid, SCDs at all times when not ambulating  Abx: daptomycin per ID recs  Cx: prior OR cx MRSA, cx 2/15 NGTD  Labs: Hgb 9.1 today   Drain: 110 cc overnight  Boyd: KELLI 2/16    Dispo: continue to work with PT, pending placement            Paul Jimenez MD  Orthopedics  [facility] - Surgery    "

## 2024-02-20 NOTE — SUBJECTIVE & OBJECTIVE
Interval history- discussed with dr jordan in [facility] and he reports drain output has improved that was documented overnight from yesterday. He will check on it this mornign and then recheck on it late morning around lunchtime to see how much it is putting out which will determine if she's ready for dc frank or if will need monitornig another day for output and let me and teams know. I updated CM on this and patient. She reports doing wlel, about to eat breakfast now. Sent pain meds and bowel regimen to pharmacy now as she is adament is not going to SNF and ortho aware as well. They are working on a new home infusion company for IV antibiotics for her. Labs stable on review today and she denies new issues.          Review of patient's allergies indicates:  No Known Allergies    No current facility-administered medications on file prior to encounter.     Current Outpatient Medications on File Prior to Encounter   Medication Sig    amlodipine-benazepril 5-20 mg (LOTREL) 5-20 mg per capsule Take 1 capsule by mouth once daily.    apixaban (ELIQUIS) 5 mg Tab Take 1 tablet (5 mg total) by mouth 2 (two) times daily.    cyclobenzaprine (FLEXERIL) 10 MG tablet Take 1 tablet (10 mg total) by mouth 3 (three) times daily as needed for Muscle spasms.    docusate sodium (COLACE) 100 MG capsule Take 1 capsule (100 mg total) by mouth 2 (two) times daily.    EScitalopram oxalate (LEXAPRO) 20 MG tablet Take 1 tablet (20 mg total) by mouth every evening.    metoprolol tartrate (LOPRESSOR) 50 MG tablet Take 1 tablet (50 mg total) by mouth 2 (two) times daily.    nystatin (MYCOSTATIN) powder Apply to affected area 3 times daily    omeprazole (PRILOSEC) 40 MG capsule Take 1 capsule (40 mg total) by mouth every morning.    [DISCONTINUED] pantoprazole (PROTONIX) 40 MG tablet Take 1 tablet (40 mg total) by mouth 2 (two) times daily.     Family History       Problem Relation (Age of Onset)    COPD Brother    Coronary artery disease Father     Depression Father    Diabetes Mother, Father, Sister, Brother    Heart disease Father    Hypertension Mother, Father    Irritable bowel syndrome Mother          Tobacco Use    Smoking status: Never    Smokeless tobacco: Never   Substance and Sexual Activity    Alcohol use: No    Drug use: No    Sexual activity: Not Currently     Review of Systems   All other systems reviewed and are negative.    Objective:     Vital Signs (Most Recent):  Temp: 98.1 °F (36.7 °C) (02/20/24 0717)  Pulse: 68 (02/20/24 0717)  Resp: 18 (02/20/24 0855)  BP: (!) 178/83 (02/20/24 0717)  SpO2: 95 % (02/20/24 0717) Vital Signs (24h Range):  Temp:  [97.2 °F (36.2 °C)-98.4 °F (36.9 °C)] 98.1 °F (36.7 °C)  Pulse:  [66-73] 68  Resp:  [16-20] 18  SpO2:  [95 %-98 %] 95 %  BP: (119-178)/(63-86) 178/83     Weight: 67.9 kg (149 lb 11.1 oz)  Body mass index is 24.16 kg/m².     Physical Exam  Vitals and nursing note reviewed.   Constitutional:       General: She is not in acute distress.     Appearance: She is well-developed. She is not diaphoretic.      Comments: Awake on exam. Reaching for breakfast   HENT:      Head: Normocephalic and atraumatic.   Eyes:      General: No scleral icterus.     Conjunctiva/sclera: Conjunctivae normal.      Comments: Disconjugate gaze (baseline)   Neck:      Vascular: No JVD.   Cardiovascular:      Rate and Rhythm: Normal rate.   Pulmonary:      Effort: Pulmonary effort is normal. No respiratory distress.      Breath sounds: No wheezing.   Musculoskeletal:      Right lower leg: Edema present.      Left lower leg: Edema present.      Comments: Bandages to LLE and drain with serosanginuous exudate. PICC in RUE    Skin:     Coloration: Skin is not jaundiced or pale.   Neurological:      Mental Status: She is alert and oriented to person, place, and time.      Motor: No abnormal muscle tone.   Psychiatric:         Mood and Affect: Mood normal.         Behavior: Behavior normal.                Significant Labs: All pertinent  labs within the past 24 hours have been reviewed.  CBC:   Recent Labs   Lab 02/19/24 0427 02/20/24 0423   WBC 9.21 9.09   HGB 8.6* 8.9*   HCT 27.2* 27.9*    388       CMP:   Recent Labs   Lab 02/19/24 0427 02/20/24 0423   * 136   K 3.7 4.0    107   CO2 23 24   GLU 77 94   BUN 8 11   CREATININE 0.6 0.6   CALCIUM 7.2* 7.5*   PROT 5.0* 5.1*   ALBUMIN 1.3* 1.4*   BILITOT 0.2 0.2   ALKPHOS 110 99   AST 19 14   ALT 7* 6*   ANIONGAP 8 5*         Significant Imaging: I have reviewed all pertinent imaging results/findings within the past 24 hours.

## 2024-02-20 NOTE — PLAN OF CARE
Problem: Adult Inpatient Plan of Care  Goal: Plan of Care Review  Outcome: Ongoing, Progressing  Goal: Patient-Specific Goal (Individualized)  Outcome: Ongoing, Progressing  Goal: Absence of Hospital-Acquired Illness or Injury  Outcome: Ongoing, Progressing  Goal: Optimal Comfort and Wellbeing  Outcome: Ongoing, Progressing  Goal: Readiness for Transition of Care  Outcome: Ongoing, Progressing     Problem: Infection  Goal: Absence of Infection Signs and Symptoms  Outcome: Ongoing, Progressing     Problem: Adjustment to Injury (Hip Fracture Medical Management)  Goal: Optimal Coping with Change in Health Status  Outcome: Ongoing, Progressing

## 2024-02-20 NOTE — NURSING
Patient discharging with family via car. Helped patient dressed with assist x2 people. Discussed discharge instructions with patient as well as son and spouse at the bedside. Patient transport with wheelchair requested. Son and spouse stated they would assist patient from wheelchair to vehicle. Pharmacy bedside delivery not obtained due to son not available at the time to pay copay- family instructed to go to outpatient pharmacy in person to  prescriptions. Patient and family verbalized understanding of all instructions. Patient leaving with PICC, and home health will be meeting patient tomorrow 2/21/24 for antibiotic infusions at home.

## 2024-02-20 NOTE — ASSESSMENT & PLAN NOTE
Froilan Ray is a 78 y.o. female with PMH of MRSA bacteremia and left-sided hip septic arthritis status post Girdlestone placement with antibiotic spacer presenting with failure of the left-sided antibiotic spacer head dissociation after fall    Now s/p left hip revision antibiotic spacer on 2/15      Pain control: MMP  PT/OT: WBAT LLE  DVT PPx: eliquis 2.5 bid, SCDs at all times when not ambulating  Abx: daptomycin per ID recs  Cx: prior OR cx MRSA, cx 2/15 NGTD  Labs: Hgb 9.1 today   Drain: 110 cc overnight  Boyd: DC 2/16    Dispo: continue to work with PT, pending placement

## 2024-02-20 NOTE — PLAN OF CARE
Patient's d/c order placed. Spoke with patient's son and spouse to discuss d/c plan. Again counseled son and spouse on safety concerns that family is declining skilled nursing care. Discussed patient requires max assist of 2 people for movement and 24 hour care. Both spouse and son verbalized understanding. Encouraged family to agree to ambulance transport due to mobility concerns. Patient, spouse and son adamant they can get patient in and out of their personal vehicle safely. Discussed with son Aristeo all supplies currently at home. He reviewed supplies while on phone with CM and reports nothing is needed. Spoke with Ewa from [facility] who will also follow-up with the family regarding IVAB supplies. Awaiting arrival and spouse and son to bedside.      Kaye SHEFFIELD  Case Management  [facility]  [phone]

## 2024-02-20 NOTE — PLAN OF CARE
CHW met with patient/family at bedside. Patient experience rounding completed and reviewed the following.     Do you know your discharge plan? Yes        If yes, what is the plan? Home    Have you discussed your needs and preferences with your SW/CM? No     If you are discharging home, do you have help at home? Yes     Do you think you will need help additional at home at discharge? No     Do you currently have difficulty keeping up with bills, affording medicine or buying food? No    Assigned SW/CM notified of any patient/family needs or concerns. Appropriate resources provided to address patient's needs.

## 2024-02-20 NOTE — ASSESSMENT & PLAN NOTE
Continue home regimen, verified by PDMP with breakthrough meds available given acute fracture.   Pain meds on dc- oxy 10 - 1/2 pill for mod pain, full pill q4 for severe pain, dispense 42, lyrica, robaxin, tylenol with bowel regimen

## 2024-02-20 NOTE — PLAN OF CARE
[facility]  Discharge Final Note    Primary Care Provider: Alphonse Boothe III, MD    Expected Discharge Date: 2/20/2024    Final Discharge Note (most recent)       Final Note - 02/20/24 1519          Final Note    Assessment Type Final Discharge Note     Anticipated Discharge Disposition Home-Health Care [facility]     What phone number can be called within the next 1-3 days to see how you are doing after discharge? --   [phone]                    Important Message from Medicare  Important Message from Medicare regarding Discharge Appeal Rights: Given to patient/caregiver, Explained to patient/caregiver, Signed/date by patient/caregiver     Date IMM was signed: 02/20/24  Time IMM was signed: 0856    Contact Info       [facility] - Orthopedics 5th Fl   Specialty: Orthopedics    [address]  [facility] [address]   Phone: [phone]       Next Steps: Follow up    Instructions: 1-2 week follow up, they will schedule    [facility] - Infectious Disease 1st Fl   Specialty: Infectious Diseases    [address]  [facility] [address]   Phone: [phone]       Next Steps: Follow up    Instructions: they will schedule close follow up            Future Appointments   Date Time Provider Department Center   2/23/2024  2:30 PM Marie Hickman NP [facility] UROLOGY [facility]   2/29/2024 10:00 AM Haroldo Morfin MD [facility]   2/29/2024 10:30 AM Sreedhar Carlos, NP [facility]   3/28/2024 10:30 AM Haroldo Morfin MD [facility]   3/28/2024 11:30 AM Sreedhar Carlos NP [facility] ORTHO [facility]     Patient discharged home to care of family, [facility] and [facility] on 2/20/24.    Kaye Lan RNCM  Case Management  [facility]  [phone]

## 2024-02-20 NOTE — PROGRESS NOTES
"[facility] Medicine  Progress Note    Patient Name: Froilan Ray  MRN: [id number]  Patient Class: IP- Inpatient   Admission Date: 2/14/2024  Length of Stay: 6 days  Attending Physician: Mayte Rogers MD  Primary Care Provider: Alphonse Boothe III, MD        Subjective:     Principal Problem:Status post Girdlestone procedure        HPI:  Froilan Ray is a 78 y.o. female with history of L hip surgery, HTN, HLD, CHF, chronic pain on opioids, peripheral neuropathy, and recent admission for MRSA septic arthritis requiring replacement of L hip hardware who was transferred from [facility] for L prosthetic hip fracture.     Patient reports that she was doing relatively well on the morning of presentation, able to ambulate with her walker without major issues. Unfortunately, while walking, her L leg "slipped," causing her to fall to the ground. She immediately noted L hip pain and inability to ambulate. She denies any pre-syncopal symptoms.She presented to the ED, where L hip fx was noted, therefore transferred here for ortho evaluation.     Overview/Hospital Course:  No notes on file    Interval history- discussed with dr jordan in [facility] and he reports drain output has improved that was documented overnight from yesterday. He will check on it this mornign and then recheck on it late morning around lunchtime to see how much it is putting out which will determine if she's ready for dc frank or if will need monitornig another day for output and let me and teams know. I updated CM on this and patient. She reports doing wlel, about to eat breakfast now. Sent pain meds and bowel regimen to pharmacy now as she is adament is not going to SNF and ortho aware as well. They are working on a new home infusion company for IV antibiotics for her. Labs stable on review today and she denies new issues.          Review of patient's allergies indicates:  No Known Allergies    No current facility-administered " medications on file prior to encounter.     Current Outpatient Medications on File Prior to Encounter   Medication Sig    amlodipine-benazepril 5-20 mg (LOTREL) 5-20 mg per capsule Take 1 capsule by mouth once daily.    apixaban (ELIQUIS) 5 mg Tab Take 1 tablet (5 mg total) by mouth 2 (two) times daily.    cyclobenzaprine (FLEXERIL) 10 MG tablet Take 1 tablet (10 mg total) by mouth 3 (three) times daily as needed for Muscle spasms.    docusate sodium (COLACE) 100 MG capsule Take 1 capsule (100 mg total) by mouth 2 (two) times daily.    EScitalopram oxalate (LEXAPRO) 20 MG tablet Take 1 tablet (20 mg total) by mouth every evening.    metoprolol tartrate (LOPRESSOR) 50 MG tablet Take 1 tablet (50 mg total) by mouth 2 (two) times daily.    nystatin (MYCOSTATIN) powder Apply to affected area 3 times daily    omeprazole (PRILOSEC) 40 MG capsule Take 1 capsule (40 mg total) by mouth every morning.    [DISCONTINUED] pantoprazole (PROTONIX) 40 MG tablet Take 1 tablet (40 mg total) by mouth 2 (two) times daily.     Family History       Problem Relation (Age of Onset)    COPD Brother    Coronary artery disease Father    Depression Father    Diabetes Mother, Father, Sister, Brother    Heart disease Father    Hypertension Mother, Father    Irritable bowel syndrome Mother          Tobacco Use    Smoking status: Never    Smokeless tobacco: Never   Substance and Sexual Activity    Alcohol use: No    Drug use: No    Sexual activity: Not Currently     Review of Systems   All other systems reviewed and are negative.    Objective:     Vital Signs (Most Recent):  Temp: 98.1 °F (36.7 °C) (02/20/24 0717)  Pulse: 68 (02/20/24 0717)  Resp: 18 (02/20/24 0855)  BP: (!) 178/83 (02/20/24 0717)  SpO2: 95 % (02/20/24 0717) Vital Signs (24h Range):  Temp:  [97.2 °F (36.2 °C)-98.4 °F (36.9 °C)] 98.1 °F (36.7 °C)  Pulse:  [66-73] 68  Resp:  [16-20] 18  SpO2:  [95 %-98 %] 95 %  BP: (119-178)/(63-86) 178/83     Weight: 67.9 kg (149 lb 11.1 oz)  Body  mass index is 24.16 kg/m².     Physical Exam  Vitals and nursing note reviewed.   Constitutional:       General: She is not in acute distress.     Appearance: She is well-developed. She is not diaphoretic.      Comments: Awake on exam. Reaching for breakfast   HENT:      Head: Normocephalic and atraumatic.   Eyes:      General: No scleral icterus.     Conjunctiva/sclera: Conjunctivae normal.      Comments: Disconjugate gaze (baseline)   Neck:      Vascular: No JVD.   Cardiovascular:      Rate and Rhythm: Normal rate.   Pulmonary:      Effort: Pulmonary effort is normal. No respiratory distress.      Breath sounds: No wheezing.   Musculoskeletal:      Right lower leg: Edema present.      Left lower leg: Edema present.      Comments: Bandages to LLE and drain with serosanginuous exudate. PICC in RUE    Skin:     Coloration: Skin is not jaundiced or pale.   Neurological:      Mental Status: She is alert and oriented to person, place, and time.      Motor: No abnormal muscle tone.   Psychiatric:         Mood and Affect: Mood normal.         Behavior: Behavior normal.                Significant Labs: All pertinent labs within the past 24 hours have been reviewed.  CBC:   Recent Labs   Lab 02/19/24 0427 02/20/24  0423   WBC 9.21 9.09   HGB 8.6* 8.9*   HCT 27.2* 27.9*    388       CMP:   Recent Labs   Lab 02/19/24 0427 02/20/24  0423   * 136   K 3.7 4.0    107   CO2 23 24   GLU 77 94   BUN 8 11   CREATININE 0.6 0.6   CALCIUM 7.2* 7.5*   PROT 5.0* 5.1*   ALBUMIN 1.3* 1.4*   BILITOT 0.2 0.2   ALKPHOS 110 99   AST 19 14   ALT 7* 6*   ANIONGAP 8 5*         Significant Imaging: I have reviewed all pertinent imaging results/findings within the past 24 hours.    Assessment/Plan:      * Antibiotic Spacer failure status post Girdlestone procedure  See fracture/ infection      Hypophosphatemia  Patient has Abnormal Phosphorus: hypophosphatemia. Will continue to monitor electrolytes closely. Will replace the  "affected electrolytes and repeat labs to be done after interventions completed. The patient's phosphorus results have been reviewed and are listed below.  Recent Labs   Lab 02/17/24  0550   PHOS 1.9*        Bacteriuria  K pneumo in urine cx, 3 days rocephin per ID recs      Vitamin D deficiency  Low at 9 and ergo started      Hypocalcemia  Patient has hypocalcemia due to Critical Illness which is currently uncontrolled, and has been confirmed with ionized and/or corrected calcium. Will replace calcium and monitor electrolytes closely. The latest calcium labs have been reviewed and are listed below.  Recent Labs   Lab 02/16/24  0410   CALCIUM 7.6*       No results for input(s): "CAION" in the last 24 hours.  -low at 8.5 corrected for albuminin the 1/s and replacemet started on 2/15 with improvements        Urinary retention  Had urinary retention during recent admission, discharged with Barrera and Urology follow-up. Will monitor output and re-attempt voiding trials after surgery. No barrera in place post op now         Staphylococcal arthritis of left hip  Recent MRSA arthritis during recent admission, planned for Daptomycin daily until 2/29 per ID recs. Will continue this now per ID recs, likely will need to restart timeline for 6 weeks per ID notes and rec end date of 3/28 with weekly labs. They rec going to SNf for antibiotics and will discuss with patient family further as they prefer HH per cm notes Picc in place to RUE      Chronic heart failure with preserved ejection fraction  Recovered EF on recent TTE. Will monitor volume status and diurese as needed.       Hypokalemia  Patient has hypokalemia which is Acute and currently controlled. Most recent potassium levels reviewed-   Lab Results   Component Value Date    K 4.4 02/16/2024   . Will continue potassium replacement per protocol and recheck repeat levels after replacement completed.     Paroxysmal atrial fibrillation  Currently controlled and NSR on admit " Continue home Metoprolol. Eliquis resumed post op    Iron deficiency anemia  Chronically severe, and stable around baseline. With baseline hg 7-9, tranfusions on 2/15 for acute blood loss anemia.    Peripheral neuropathy  Continue Lyrica.     Essential hypertension  Hold bp meds 2/16 for lower bps post op  BP uptick on 2/18 so add back norvasc and lower dose metoprolol    Femur fracture, left  Recent revision of L hip replacement due to septic arthritis, and presents with pain following a fall to the left hip. CT shows acute fracture involving screw at junction of left femoral head and neck components of left hip hemiarthroplasty, with superior displacement of the distal fragment. Ortho consulted and had revision of spacer on 2/15, Revision placement of articulating cement coated antibiotic spacer left hip, Excisional and non excisional debridement and irrigation left hip, skin, subcutaneous tissue fascia and muscle  -TTWB for 6 weeks to LLE with posterior hip precautions per ortho, will need SNF as failed home treatments but fam hesitant per notes, will discuss further with them.  She reports is amenable but states  wants her home. Discussed more on 2/18 will f/u with CM and family tomorrow more and extensive discussion 2/19 and after she talked with family she is not amenable and CM discussed with family and not amenable either  -pain control with multimodals  -hg stable at 10--8.9 post op now, bowel regimen  -CX NG so far, will need 6 weeks antibiotics restarted per ID, on dapto now  -drain in place post op       Chronic pain with uncomplicated opioid dependence  Continue home regimen, verified by PDMP with breakthrough meds available given acute fracture.   Pain meds on dc- oxy 10 - 1/2 pill for mod pain, full pill q4 for severe pain, dispense 42, lyrica, robaxin, tylenol with bowel regimen        VTE Risk Mitigation (From admission, onward)           Ordered     apixaban tablet 5 mg  2 times daily          02/18/24 1414     Place sequential compression device  Until discontinued         02/15/24 1017     IP VTE HIGH RISK PATIENT  Once         02/15/24 1017     Place sequential compression device  Until discontinued         02/14/24 2333                    Discharge Planning   BAILEY: 2/20/2024     Code Status: Full Code   Is the patient medically ready for discharge?: No    Reason for patient still in hospital (select all that apply): Patient trending condition  Discharge Plan A: Home with family, Home Health                  Mayte Rogers MD  Department of Hospital Medicine   [facility]

## 2024-02-20 NOTE — SUBJECTIVE & OBJECTIVE
"Principal Problem:Status post Girdlestone procedure    Principal Orthopedic Problem: s/p left hip revision antibiotic spacer placement on 2/15    Interval History: NAEON. VSS. L hip pain controlled. Drain output 110cc/24 hr, ss. CRP up to 81.2 today from 80.1.  Hgb 8.6.     Review of patient's allergies indicates:  No Known Allergies    Current Facility-Administered Medications   Medication    amLODIPine tablet 5 mg    apixaban tablet 5 mg    bisacodyL suppository 10 mg    calcium carbonate 200 mg calcium (500 mg) chewable tablet 1,000 mg    cyclobenzaprine tablet 10 mg    DAPTOmycin (CUBICIN) 675 mg in sodium chloride 0.9% SolP 50 mL IVPB    docusate sodium capsule 100 mg    ergocalciferol capsule 50,000 Units    EScitalopram oxalate tablet 20 mg    melatonin tablet 6 mg    metoprolol tartrate (LOPRESSOR) tablet 50 mg    oxyCODONE immediate release tablet 5 mg    And    oxyCODONE immediate release tablet Tab 10 mg    And    morphine injection 2 mg    nystatin powder    ondansetron injection 4 mg    polyethylene glycol packet 17 g    potassium, sodium phosphates 280-160-250 mg packet 2 packet    pregabalin capsule 200 mg    sodium chloride 0.9% flush 10 mL    sodium chloride 0.9% flush 10 mL    vitamin D 1000 units tablet 1,000 Units     Objective:     Vital Signs (Most Recent):  Temp: 98 °F (36.7 °C) (02/19/24 1941)  Pulse: 69 (02/19/24 1941)  Resp: 18 (02/19/24 1941)  BP: 119/63 (02/19/24 1941)  SpO2: 97 % (02/19/24 1941) Vital Signs (24h Range):  Temp:  [97.2 °F (36.2 °C)-99 °F (37.2 °C)] 98 °F (36.7 °C)  Pulse:  [66-73] 69  Resp:  [16-20] 18  SpO2:  [95 %-98 %] 97 %  BP: (119-155)/(63-86) 119/63     Weight: 67.9 kg (149 lb 11.1 oz)  Height: 5' 6" (167.6 cm)  Body mass index is 24.16 kg/m².      Intake/Output Summary (Last 24 hours) at 2/19/2024 2300  Last data filed at 2/19/2024 2000  Gross per 24 hour   Intake 440 ml   Output 1280 ml   Net -840 ml          Ortho/SPM Exam     Awake/alert/oriented x3, No acute " distress, Afebrile, Vital signs stable  Good inspiratory effort with unlaboured breathing  Dressings c/d/i  NVI in operative limb     Significant Labs: All pertinent labs within the past 24 hours have been reviewed.    Significant Imaging: I have reviewed all pertinent imaging results/findings.

## 2024-02-20 NOTE — DISCHARGE SUMMARY
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 [Z98.890]  Not Applicable    Hypophosphatemia [E83.39]  No    Bacteriuria [R82.71]  Yes     Gram negative rods      Hypocalcemia [E83.51]  Yes    Hypoalbuminemia [E88.09]  Yes    Vitamin D deficiency [E55.9]  Yes    Urinary retention [R33.9]  Yes     Chronic    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]  Yes     Chronic    Chronic heart failure with preserved ejection fraction [I50.32]  Yes    Hypokalemia [E87.6]  Yes    Paroxysmal atrial fibrillation [I48.0]  Yes    Iron deficiency anemia [D50.9]  Yes     Chronic    Essential hypertension [I10]  Yes     Chronic    Peripheral neuropathy [G62.9]  Yes     Chronic    Femur fracture, left [S72.92XA]  Yes    Chronic pain with uncomplicated opioid dependence [F11.20]  Yes     Chronic      Resolved Hospital Problems    Diagnosis Date Resolved POA    Preoperative examination [Z01.818] 02/16/2024 Not Applicable       Discharged Condition:  stable    HOSPITAL COURSE:      Initial Presentation:    Froilan Ray is a 78 y.o. female with history of L hip surgery, HTN, HLD, CHF, chronic pain on opioids, peripheral neuropathy, and recent admission for MRSA septic arthritis requiring replacement of L hip hardware who was transferred from [facility] for L prosthetic hip fracture.      Patient reports that she was doing relatively well on the morning of presentation, able to ambulate with her walker without major issues. Unfortunately, while walking, her L leg "slipped," causing her to fall to the ground. She immediately noted L hip pain and inability to ambulate. She denies any pre-syncopal symptoms.She presented to " "the ED, where L hip fx was noted, therefore transferred here for ortho evaluation.     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 Will continue to monitor electrolytes closely. Will replace the affected electrolytes and repeat labs to be done after interventions completed. The patient's phosphorus results have been reviewed and are listed below.      Recent Labs   Lab 02/17/24  0550   PHOS 1.9*         Bacteriuria  K pneumo in urine cx, 3 days rocephin per ID recs        Vitamin D deficiency  Low at 9 and ergo started        Hypocalcemia  Patient has hypocalcemia due to Critical Illness which is currently uncontrolled, and has been confirmed with ionized and/or corrected calcium. Will replace calcium and monitor electrolytes closely. The latest calcium labs have been reviewed and are listed below.      Recent Labs   Lab 02/16/24  0410   CALCIUM 7.6*       No results for input(s): "CAION" in the last 24 hours.  -low at 8.5 corrected for albuminin the 1/s and replacemet started on 2/15 with improvements           Urinary retention  Had urinary retention during recent admission, discharged with Barrera and Urology follow-up. Will monitor output and re-attempt voiding trials after surgery. No barrera in place post op now            Staphylococcal arthritis of left hip  Recent MRSA arthritis during recent admission, planned for Daptomycin daily until 2/29 per ID recs. Will continue this now per ID recs, likely will need to restart timeline for 6 weeks per ID notes and rec end date of 3/28 with weekly labs. They rec going to SNf for antibiotics and will discuss with patient family further as they prefer HH per cm notes Picc in place to RUE        Chronic heart failure with preserved ejection fraction  Recovered EF on recent TTE.        Hypokalemia  Patient has " hypokalemia which is Acute and currently controlled. Most recent potassium levels reviewed-         Lab Results   Component Value Date     K 4.4 02/16/2024   . Will continue potassium replacement per protocol and recheck repeat levels after replacement completed.      Paroxysmal atrial fibrillation  Currently controlled and NSR on admit Continue home Metoprolol. Eliquis resumed post op     Iron deficiency anemia  Chronically severe, and stable around baseline. With baseline hg 7-9, tranfusions on 2/15 for acute blood loss anemia.     Peripheral neuropathy  Continue Lyrica.      Essential hypertension  Hold bp meds 2/16 for lower bps post op  BP uptick on 2/18 so add back norvasc and home metoprolol with improvements     Femur fracture, left  Recent revision of L hip replacement due to septic arthritis, and presents with pain following a fall to the left hip. CT shows acute fracture involving screw at junction of left femoral head and neck components of left hip hemiarthroplasty, with superior displacement of the distal fragment. Ortho consulted and had revision of spacer on 2/15, Revision placement of articulating cement coated antibiotic spacer left hip, Excisional and non excisional debridement and irrigation left hip, skin, subcutaneous tissue fascia and muscle  -TTWB for 6 weeks to LLE with posterior hip precautions per ortho, will need SNF as failed home treatments but fam hesitant per notes, will discuss further with them.  She reports is amenable but states  wants her home. Discussed more on 2/18 will f/u with CM and family tomorrow more and extensive discussion 2/19 and after she talked with family she is not amenable and CM discussed with family and not amenable either. I discussed again with her 2/20 about this and she again reiterated that she did not want to go to SNF  -pain control with multimodals  -hg stable at 10--8.9 post op now, bowel regimen  -CX NG , will need 6 weeks antibiotics restarted  per ID per previus Cx last admit, on dapto now  -drain in place post op and output improved this afternoon so ortho reported that they removed it and stable to discharge home        Chronic pain with uncomplicated opioid dependence  Continue home regimen, verified by PDMP with breakthrough meds available given acute fracture.   Pain meds on dc- oxy 10 - 1/2 pill for mod pain, full pill q4 for severe pain, dispense 42, lyrica, robaxin, tylenol with bowel regimen       Consults: ortho, ID    Last CBC/BMP:    CBC/Anemia Labs: Coags:    Recent Labs   Lab 02/18/24 0457 02/19/24 0427 02/20/24 0423   WBC 9.89 9.21 9.09   HGB 9.1* 8.6* 8.9*   HCT 28.2* 27.2* 27.9*    396 388   MCV 92 93 93   RDW 17.1* 17.1* 16.9*    Recent Labs   Lab 02/14/24  1137   INR 1.0        Chemistries:   Recent Labs   Lab 02/18/24 0457 02/19/24 0427 02/20/24  0423    134* 136   K 3.8 3.7 4.0    103 107   CO2 25 23 24   BUN 11 8 11   CREATININE 0.7 0.6 0.6   CALCIUM 7.7* 7.2* 7.5*   PROT 5.1* 5.0* 5.1*   BILITOT 0.3 0.2 0.2   ALKPHOS 118 110 99   ALT 6* 7* 6*   AST 18 19 14   MG 1.6 1.6 1.7   PHOS 2.5* 2.9 3.0              Special Treatments/Procedures:   Procedure(s) (LRB):  Revision hip antibiotic spacer head exchange, left, lateral, peg board, depuy osteomed in room, vanc, ancef, txa, (Left)  IRRIGATION AND DEBRIDEMENT, LOWER EXTREMITY (Left)  FLAP GRAFT, LOCAL (Left)     Disposition: Home-Health Care [facility]      Future Scheduled Appointments:  Future Appointments   Date Time Provider Department Center   2/23/2024  2:30 PM Marie Hickman NP [facility] UROLOGY [place]   2/29/2024 10:00 AM Haroldo Morfin MD [facility] ID [facility]   2/29/2024 10:30 AM Sreedhar Carlos NP [facility] [address] [place]   3/28/2024 10:30 AM Harlodo Morfin MD [facility] [place]   3/28/2024 11:30 AM Sreedhar Carlos, NP [facility] [address]           Discharge Medication List:       Medication List        START taking these medications       MAXIMILIANO-GEST ANTACID 200 mg calcium (500 mg) chewable tablet  Generic drug: calcium carbonate  Chew and swallow 2 tablets (1,000 mg total) by mouth once daily.     GAVILAX 17 gram/dose powder  Generic drug: polyethylene glycol  Use to cap to measure 17 grams, mix with liquid, and take by mouth once daily.     oxyCODONE 10 mg Tab immediate release tablet  Commonly known as: ROXICODONE  Take 1/2 tablet by mouth for pain scale 4-7 and 1 tablet for pain scale 7-10 every 4 hours as needed     sodium chloride 0.9% SolP 50 mL with DAPTOmycin 500 mg SolR 676 mg  Inject 676 mg into the vein Daily. End date 3/28/24  Replaces: sodium chloride 0.9% SolP 50 mL with DAPTOmycin 500 mg SolR 500 mg     VITAMIN D2 50,000 unit Cap  Generic drug: ergocalciferol  Take 1 capsule (50,000 Units total) by mouth every 7 days.  Start taking on: February 22, 2024            CHANGE how you take these medications      acetaminophen 325 MG tablet  Commonly known as: TYLENOL  Take 2 tablets (650 mg total) by mouth every 8 (eight) hours as needed for Pain (pain scale 1-4).  What changed: reasons to take this     pregabalin 200 MG Cap  Commonly known as: LYRICA  Take 1 capsule (200 mg total) by mouth 3 (three) times daily.  What changed: additional instructions            CONTINUE taking these medications      amlodipine-benazepril 5-20 mg 5-20 mg per capsule  Commonly known as: LOTREL  Take 1 capsule by mouth once daily.     apixaban 5 mg Tab  Commonly known as: ELIQUIS  Take 1 tablet (5 mg total) by mouth 2 (two) times daily.     cyclobenzaprine 10 MG tablet  Commonly known as: FLEXERIL  Take 1 tablet (10 mg total) by mouth 3 (three) times daily as needed for Muscle spasms.     docusate sodium 100 MG capsule  Commonly known as: COLACE  Take 1 capsule (100 mg total) by mouth 2 (two) times daily.     EScitalopram oxalate 20 MG tablet  Commonly known as: LEXAPRO  Take 1 tablet (20 mg total) by mouth every evening.     metoprolol tartrate 50 MG  tablet  Commonly known as: LOPRESSOR  Take 1 tablet (50 mg total) by mouth 2 (two) times daily.     nystatin powder  Commonly known as: MYCOSTATIN  Apply to affected area 3 times daily     omeprazole 40 MG capsule  Commonly known as: PRILOSEC  Take 1 capsule (40 mg total) by mouth every morning.            STOP taking these medications      doxycycline 100 MG Cap  Commonly known as: VIBRAMYCIN     oxyCODONE-acetaminophen  mg per tablet  Commonly known as: PERCOCET     sodium chloride 0.9% SolP 50 mL with DAPTOmycin 500 mg SolR 500 mg  Replaced by: sodium chloride 0.9% SolP 50 mL with DAPTOmycin 500 mg SolR 676 mg     traMADoL 50 mg tablet  Commonly known as: ULTRAM     valACYclovir 1000 MG tablet  Commonly known as: VALTREX            ASK your doctor about these medications      OPW TEST CLAIM - DO NOT FILL  OPW test claim. Do not fill.               Where to Get Your Medications        These medications were sent to [facility] [address] [address]      Hours: Mon-Fri 7a-7p, Sat-Sun 10a-4p Phone: [phone]   MAXIMILIANO-GEST ANTACID 200 mg calcium (500 mg) chewable tablet  GAVILAX 17 gram/dose powder  OPW TEST CLAIM - DO NOT FILL  oxyCODONE 10 mg Tab immediate release tablet  pregabalin 200 MG Cap  VITAMIN D2 50,000 unit Cap       Information about where to get these medications is not yet available    Ask your nurse or doctor about these medications  acetaminophen 325 MG tablet  sodium chloride 0.9% SolP 50 mL with DAPTOmycin 500 mg SolR 676 mg         Patient Instructions:  Discharge Procedure Orders   Ambulatory referral/consult to [facility]   Standing Status: Future   Referral Priority: Routine Referral Type: Consultation   Referral Reason: Specialty Services Required   Number of Visits Requested: 1     Ambulatory referral/consult to [facility]   Standing Status: Future   Referral Priority: Routine Referral Type: Consultation    Referral Reason: Specialty Services Required   Number of Visits Requested: 1     Diet Adult Regular     Notify your health care provider if you experience any of the following:  temperature >100.4     Notify your health care provider if you experience any of the following:  severe uncontrolled pain     Notify your health care provider if you experience any of the following:  redness, tenderness, or signs of infection (pain, swelling, redness, odor or green/yellow discharge around incision site)     Other restrictions (specify):   Order Comments: TTWB to LLE for 6 weeks.     Ambulatory Request for Ready Responder Services   Standing Status: Future Standing Exp. Date: 02/20/25     Order Specific Question Answer Comments   Program referred to: COVID-19 Vaccine        At the time of discharge patient was told to take all medications as prescribed, to keep all followup appointments, and to call their primary care physician or return to the emergency room if they have any worsening or concerning symptoms.    I spent 35 minutes preparing the discharge      Signing Physician:  Mayte Rogers MD

## 2024-02-21 ENCOUNTER — HOSPITAL ENCOUNTER (EMERGENCY)
Facility: HOSPITAL | Age: 79
Discharge: HOME | End: 2024-02-21
Attending: EMERGENCY MEDICINE
Payer: MEDICARE

## 2024-02-21 ENCOUNTER — DOCUMENT SCAN (OUTPATIENT)
Dept: HOME HEALTH SERVICES | Facility: HOSPITAL | Age: 79
End: 2024-02-21
Payer: MEDICARE

## 2024-02-21 VITALS
SYSTOLIC BLOOD PRESSURE: 109 MMHG | OXYGEN SATURATION: 98 % | HEART RATE: 80 BPM | TEMPERATURE: 99 F | DIASTOLIC BLOOD PRESSURE: 60 MMHG | RESPIRATION RATE: 18 BRPM

## 2024-02-21 DIAGNOSIS — Z95.828 S/P PICC CENTRAL LINE PLACEMENT: ICD-10-CM

## 2024-02-21 DIAGNOSIS — Z78.9 PROBLEM WITH VASCULAR ACCESS: Primary | ICD-10-CM

## 2024-02-21 PROCEDURE — C1751 CATH, INF, PER/CENT/MIDLINE: HCPCS

## 2024-02-21 PROCEDURE — 36569 INSJ PICC 5 YR+ W/O IMAGING: CPT

## 2024-02-21 PROCEDURE — 99283 EMERGENCY DEPT VISIT LOW MDM: CPT | Mod: 25

## 2024-02-21 NOTE — ED PROVIDER NOTES
Encounter Date: 2/21/2024       History     Chief Complaint   Patient presents with    Vascular Access Problem     PICC line out      78-year-old female presents emergency department requesting for a new PICC line to be placed in her arm.  Patient recently had left hip fracture with subsequent MRSA infection hospitalized at [facility] discharged home with a PICC line to the right upper extremity for 6 weeks of IV daptomycin per ID.  Patient reports that her PICC line came out this morning.  She has no current complaints      Review of patient's allergies indicates:  No Known Allergies  Past Medical History:   Diagnosis Date    Anemia     Arthritis     Bleeding ulcer 07/2016    Chronic pain     DDD (degenerative disc disease), cervical     DDD (degenerative disc disease), lumbar     Depression     Disc degeneration, lumbosacral     Diverticulitis     Encounter for blood transfusion     Hypertension     IBS (irritable bowel syndrome)     Neuropathy of both feet     Seizures     none  since 2017    Umbilical hernia 2020     Past Surgical History:   Procedure Laterality Date    ARTHROPLASTY, HIP, GIRDLESTONE, POSTERIOR APPROACH Left 1/19/2024    Procedure: ARTHROPLASTY, HIP, GIRDLESTONE, POSTERIOR APPROACH;  Surgeon: Bulmaro Tellez MD;  Location: [facility] OR [address] [place] [address];  Service: Orthopedics;  Laterality: Left;    ARTHROPLASTY, HIP, GIRDLESTONE, POSTERIOR APPROACH Left 1/25/2024    Procedure: Irrigation and debridement left hip,;  Surgeon: Juanito Painting MD;  Location: [facility] OR [address] [place] [address];  Service: Orthopedics;  Laterality: Left;    ARTHROPLASTY, HIP, GIRDLESTONE, POSTERIOR APPROACH Left 2/15/2024    Procedure: Revision hip antibiotic spacer head exchange, left, lateral, peg board, depuy osteomed in room, vanc, ancef, txa,;  Surgeon: Bulmaro Tellez MD;  Location: [facility] OR [address] [place] [address];  Service: Orthopedics;  Laterality: Left;    BACK SURGERY      BREAST BIOPSY      BREAST SURGERY Right     lumpectomy     CAUDAL EPIDURAL STEROID INJECTION N/A 01/28/2022    Procedure: Injection-steroid-epidural-caudal;  Surgeon: Luzmaria Marcelino MD;  Location: [facility] OR;  Service: Pain Management;  Laterality: N/A;    COLONOSCOPY N/A 01/14/2022    Procedure: COLONOSCOPY;  Surgeon: Abby Landers MD;  Location: [facility] ENDO;  Service: Endoscopy;  Laterality: N/A;    ENDOSCOPIC ULTRASOUND OF UPPER GASTROINTESTINAL TRACT N/A 07/21/2020    Procedure: ULTRASOUND, UPPER GI TRACT, ENDOSCOPIC;  Surgeon: Marcio Nguyễn III, MD;  Location: [facility] ENDO;  Service: Endoscopy;  Laterality: N/A;    ESOPHAGOGASTRODUODENOSCOPY N/A 01/14/2022    Procedure: EGD (ESOPHAGOGASTRODUODENOSCOPY);  Surgeon: Abby Landers MD;  Location: [facility] ENDO;  Service: Endoscopy;  Laterality: N/A;    ESOPHAGOGASTRODUODENOSCOPY N/A 06/10/2022    Procedure: EGD (ESOPHAGOGASTRODUODENOSCOPY);  Surgeon: Abby Landers MD;  Location: [facility] ENDO;  Service: Endoscopy;  Laterality: N/A;    EYE SURGERY      cataract    FLAP GRAFT, LOCAL Left 2/15/2024    Procedure: FLAP GRAFT, LOCAL;  Surgeon: Bulmaro Tellez MD;  Location: [facility] OR 2ND FLR;  Service: Orthopedics;  Laterality: Left;    HYSTERECTOMY      INTRAMEDULLARY RODDING OF TROCHANTER OF FEMUR Left 12/11/2018    Procedure: INSERTION, INTRAMEDULLARY SANTOS, FEMUR, TROCHANTER;  Surgeon: Eulalio De La Cruz MD;  Location: [facility] OR;  Service: Orthopedics;  Laterality: Left;    IRRIGATION AND DEBRIDEMENT OF LOWER EXTREMITY Left 1/19/2024    Procedure: IRRIGATION AND DEBRIDEMENT, LOWER EXTREMITY;  Surgeon: Bulmaro Tellez MD;  Location[facility] OR 2ND FLR;  Service: Orthopedics;  Laterality: Left;    IRRIGATION AND DEBRIDEMENT OF LOWER EXTREMITY Left 2/15/2024    Procedure: IRRIGATION AND DEBRIDEMENT, LOWER EXTREMITY;  Surgeon: Bulmaro Tellez MD;  Location: [facility] OR 2ND FLR;  Service: Orthopedics;  Laterality: Left;    REPAIR OF EPIGASTRIC HERNIA N/A 12/7/2023    Procedure: REPAIR, HERNIA, EPIGASTRIC;  Surgeon: Luz  Vipul MORSE MD;  Location: [facility] OR;  Service: General;  Laterality: N/A;    SPINAL CORD STIMULATOR IMPLANT  09/18/2013    and removal    SPINE SURGERY  2006    lumbar L2-S1 decompression.    SPINE SURGERY      cervical decompression    TONSILLECTOMY       Family History   Problem Relation Age of Onset    Diabetes Mother     Hypertension Mother     Irritable bowel syndrome Mother     Diabetes Father         insulin dependent    Hypertension Father     Heart disease Father     Coronary artery disease Father     Depression Father     Diabetes Sister     Diabetes Brother     COPD Brother      Social History     Tobacco Use    Smoking status: Never    Smokeless tobacco: Never   Substance Use Topics    Alcohol use: No    Drug use: No     Review of Systems   Constitutional: Negative.    HENT: Negative.     Respiratory: Negative.     Cardiovascular: Negative.    Gastrointestinal: Negative.    Genitourinary: Negative.    Neurological: Negative.    Hematological: Negative.    Psychiatric/Behavioral: Negative.     All other systems reviewed and are negative.      Physical Exam     Initial Vitals [02/21/24 1528]   BP Pulse Resp Temp SpO2   109/60 80 18 98.6 °F (37 °C) 98 %      MAP       --         Physical Exam    Nursing note and vitals reviewed.  Constitutional: She appears well-developed and well-nourished.   HENT:   Head: Normocephalic.   Cardiovascular:  Normal rate.           Pulmonary/Chest: Breath sounds normal.   Abdominal: Abdomen is soft. There is no abdominal tenderness.     Neurological: She is alert.   Skin: Skin is warm.         ED Course   Procedures  Labs Reviewed - No data to display       Imaging Results              X-Ray Chest AP Portable (Final result)  Result time 02/21/24 16:27:49      Final result by Sabas Vila MD (02/21/24 16:27:49)                   Narrative:    XR CHEST 1 VIEW    CLINICAL HISTORY:  78 years Female picc line    COMPARISON: February 6, 2024    FINDINGS: Cardiac silhouette size  is within normal limits and stable compared to prior. Previously seen right PICC has been removed. Interval placement of left PICC, with tip projecting over the expected location of the SVC.    No confluent airspace disease. No pneumothorax or pleural effusion. Mild atelectasis left lung base.    IMPRESSION:    Interval removal of right PICC and placement of left PICC in appropriate position.    No acute pulmonary pathology.    Electronically signed by:  Sabas Vila MD  02/21/2024 04:27 PM CST Workstation: [id number]                                  X-Rays:   Independently Interpreted Readings:   Chest X-Ray: Chest x-ray reveals PICC line tip in correct position no pneumothorax     Medications - No data to display  Medical Decision Making  Attending Note: I discussed the patient's care with Advanced Practice Clinician.  I reviewed their note and agree with the history, physical, assessment, diagnosis, treatment, all procedures performed, xray and EKG interpretations and discharge plan provided by the Advanced Practice Clinician. My overall impression is PICC line replacement.  Patient was on daptomycin IV at home PICC line was accidentally removed today it has been replaced here by Evangelista and placement has been confirmed by chest x-ray no complications seen.  She will be discharged to continue IV daptomycin as previously prescribed  The patient has been instructed to follow up with their physician or the one provided as well as specific return precautions.   Cherry Card M.D. 2/21/2024 4:24 PM    78-year-old female presents emergency department requesting for a new PICC line to be placed in her arm.  Patient recently had left hip fracture with subsequent MRSA infection hospitalized at [facility] discharged home with a PICC line to the right upper extremity for 6 weeks of IV daptomycin per ID.  Patient reports that her PICC line came out this morning.  She has no current complaints    Patient presents  emergency department secondary her PICC line accidentally being removed from her right AC.  Patient has no current complaints she is receiving daptomycin daily for the next 6 weeks secondary to a recent MRSA infection to the left hip.  Evangelista the PICC nurse in the hospital did place another PICC line to the left AC x-ray imaging reviewed and shows proper placement with no complications after procedure.  Patient reports that her son administers her IV medication nightly at a p.m. she will be discharged home given return precautions    Amount and/or Complexity of Data Reviewed  External Data Reviewed: labs and notes.  Radiology: ordered and independent interpretation performed. Decision-making details documented in ED Course.                                      Clinical Impression:  Final diagnoses:  [Z78.9] Problem with vascular access (Primary)  [Z95.828] S/P PICC central line placement          ED Disposition Condition    Discharge Stable          ED Prescriptions    None       Follow-up Information       Follow up With Specialties Details Why Contact Info    Alphonse Boothe III, MD Family Medicine Schedule an appointment as soon as possible for a visit in 2 days  [address] [facility]  [address] [place] [id number]  [phone]               Fay Mcarthur FNP  02/21/24 0854       Cherry Card MD  02/21/24 0480

## 2024-02-21 NOTE — ED NOTES
"Froilan Ray presents to the ED for her PICC line being out. Pt. Stated, "The home health nurse came by to give me my antibiotics and it was just gone." Pt. Unaware of how or when the PICC line came out. Pt. Identifers checked and are accurate with Froilan Ray. Pt. Connected to bp cuff and pulse ox. Bed locked and in lowest position. Side rails raised x 2. Call light left in reach and pt. Instructed to call for assistance.   "

## 2024-02-21 NOTE — PROCEDURES
PICC  Date/Time: 2/21/2024 4:18 PM  Location procedure was performed: [facility] EMERGENCY DEPARTMENT  Performed by: Shemar Quan RN  Supervising provider: Michael Trujillo RN  Assisting provider: Fay Mcarthur FNP  Pre-operative Diagnosis: Picc line accidently pulled out  Time out: Immediately prior to procedure a time out was called to verify the correct patient, procedure, equipment, support staff and site/side marked as required  Indications: med administration  Anesthesia: local infiltration  Local anesthetic: lidocaine 1% without epinephrine  Anesthetic Total (mL): 5  Preparation: skin prepped with Betadine and skin prepped with ChloraPrep  Skin prep agent dried: skin prep agent completely dried prior to procedure  Sterile barriers: all five maximum sterile barriers used - cap, mask, sterile gown, sterile gloves, and large sterile sheet  Hand hygiene: hand hygiene performed prior to central venous catheter insertion  Location details: left basilic  Catheter type: double lumen  Catheter size: 5 Fr  Catheter Length: 41cm    Ultrasound guidance: yes  Vessel Caliber: medium and patent, compressibility normal  Vascular Doppler: not done  Needle advanced into vessel with real time Ultrasound guidance.  Guidewire confirmed in vessel.  Sterile sheath used.  no esophageal manometryNumber of attempts: 1  Post-procedure: blood return through all ports, chlorhexidine patch and sterile dressing applied  Technical procedures used: seldinger technique  Estimated blood loss (mL): 0  Specimens: No  Implants: No  Assessment: placement verified by x-ray, no pneumothorax on x-ray and successful placement  Complications: none

## 2024-02-23 ENCOUNTER — PATIENT OUTREACH (OUTPATIENT)
Dept: ADMINISTRATIVE | Facility: CLINIC | Age: 79
End: 2024-02-23
Payer: MEDICARE

## 2024-02-23 ENCOUNTER — TELEPHONE (OUTPATIENT)
Dept: UROLOGY | Facility: CLINIC | Age: 79
End: 2024-02-23
Payer: MEDICARE

## 2024-02-23 LAB — BACTERIA SPEC ANAEROBE CULT: NORMAL

## 2024-02-23 NOTE — TELEPHONE ENCOUNTER
Spoke with patient's  about today's urology appt. Patient's  stated the appt needed to be canceled, Obyd was removed at hospital discharge and she is voiding just fine. Explained to call and rescheduled if any problems occur

## 2024-02-23 NOTE — PROGRESS NOTES
C3 nurse attempted to contact Froilan Ray for a TCC post hospital discharge follow up call. No answer. Left voicemail with callback information. The patient does not have a scheduled HOSFU appointment. Message sent to PCP staff for assistance with scheduling visit with patient.

## 2024-02-26 ENCOUNTER — LAB VISIT (OUTPATIENT)
Dept: LAB | Facility: HOSPITAL | Age: 79
End: 2024-02-26
Attending: HOSPITALIST
Payer: MEDICAID

## 2024-02-26 DIAGNOSIS — Z98.890 PERSONAL HISTORY OF SURGERY TO HEART AND GREAT VESSELS, PRESENTING HAZARDS TO HEALTH: Primary | ICD-10-CM

## 2024-02-26 LAB
ALBUMIN SERPL BCP-MCNC: 1.6 G/DL (ref 3.5–5.2)
ALP SERPL-CCNC: 97 U/L (ref 55–135)
ALT SERPL W/O P-5'-P-CCNC: 6 U/L (ref 10–44)
ANION GAP SERPL CALC-SCNC: 6 MMOL/L (ref 8–16)
AST SERPL-CCNC: 14 U/L (ref 10–40)
BASOPHILS # BLD AUTO: 0.09 K/UL (ref 0–0.2)
BASOPHILS NFR BLD: 0.9 % (ref 0–1.9)
BILIRUB SERPL-MCNC: 0.2 MG/DL (ref 0.1–1)
BUN SERPL-MCNC: 11 MG/DL (ref 8–23)
CALCIUM SERPL-MCNC: 8.3 MG/DL (ref 8.7–10.5)
CHLORIDE SERPL-SCNC: 104 MMOL/L (ref 95–110)
CK SERPL-CCNC: 37 U/L (ref 20–180)
CO2 SERPL-SCNC: 27 MMOL/L (ref 23–29)
CREAT SERPL-MCNC: 0.7 MG/DL (ref 0.5–1.4)
CRP SERPL-MCNC: 74.4 MG/L (ref 0–8.2)
DIFFERENTIAL METHOD BLD: ABNORMAL
EOSINOPHIL # BLD AUTO: 0.5 K/UL (ref 0–0.5)
EOSINOPHIL NFR BLD: 4.6 % (ref 0–8)
ERYTHROCYTE [DISTWIDTH] IN BLOOD BY AUTOMATED COUNT: 16 % (ref 11.5–14.5)
EST. GFR  (NO RACE VARIABLE): >60 ML/MIN/1.73 M^2
GLUCOSE SERPL-MCNC: 76 MG/DL (ref 70–110)
HCT VFR BLD AUTO: 28 % (ref 37–48.5)
HGB BLD-MCNC: 8.5 G/DL (ref 12–16)
IMM GRANULOCYTES # BLD AUTO: 0.06 K/UL (ref 0–0.04)
IMM GRANULOCYTES NFR BLD AUTO: 0.6 % (ref 0–0.5)
LYMPHOCYTES # BLD AUTO: 0.9 K/UL (ref 1–4.8)
LYMPHOCYTES NFR BLD: 9.4 % (ref 18–48)
MCH RBC QN AUTO: 29.7 PG (ref 27–31)
MCHC RBC AUTO-ENTMCNC: 30.4 G/DL (ref 32–36)
MCV RBC AUTO: 98 FL (ref 82–98)
MONOCYTES # BLD AUTO: 0.7 K/UL (ref 0.3–1)
MONOCYTES NFR BLD: 6.9 % (ref 4–15)
NEUTROPHILS # BLD AUTO: 7.5 K/UL (ref 1.8–7.7)
NEUTROPHILS NFR BLD: 77.6 % (ref 38–73)
NRBC BLD-RTO: 0 /100 WBC
PLATELET # BLD AUTO: 403 K/UL (ref 150–450)
PMV BLD AUTO: 9.5 FL (ref 9.2–12.9)
POTASSIUM SERPL-SCNC: 4.1 MMOL/L (ref 3.5–5.1)
PROT SERPL-MCNC: 5.6 G/DL (ref 6–8.4)
RBC # BLD AUTO: 2.86 M/UL (ref 4–5.4)
SODIUM SERPL-SCNC: 137 MMOL/L (ref 136–145)
WBC # BLD AUTO: 9.7 K/UL (ref 3.9–12.7)

## 2024-02-26 PROCEDURE — 86140 C-REACTIVE PROTEIN: CPT | Performed by: HOSPITALIST

## 2024-02-26 PROCEDURE — 82550 ASSAY OF CK (CPK): CPT | Performed by: HOSPITALIST

## 2024-02-26 PROCEDURE — 80053 COMPREHEN METABOLIC PANEL: CPT | Performed by: HOSPITALIST

## 2024-02-26 PROCEDURE — 85025 COMPLETE CBC W/AUTO DIFF WBC: CPT | Performed by: HOSPITALIST

## 2024-02-26 NOTE — PROGRESS NOTES
Called patient's  Otoniel to conduct TCC call but he is unfamiliar with pt's medications. Pt states son handles all of patient's medications but he is asleep (works nights and sleeps days) and is unable to be reached.

## 2024-02-29 ENCOUNTER — OFFICE VISIT (OUTPATIENT)
Dept: INFECTIOUS DISEASES | Facility: CLINIC | Age: 79
End: 2024-02-29
Payer: MEDICARE

## 2024-02-29 ENCOUNTER — EXTERNAL HOME HEALTH (OUTPATIENT)
Dept: HOME HEALTH SERVICES | Facility: HOSPITAL | Age: 79
End: 2024-02-29
Payer: MEDICARE

## 2024-02-29 ENCOUNTER — OFFICE VISIT (OUTPATIENT)
Dept: ORTHOPEDICS | Facility: CLINIC | Age: 79
End: 2024-02-29
Payer: MEDICARE

## 2024-02-29 VITALS
SYSTOLIC BLOOD PRESSURE: 121 MMHG | TEMPERATURE: 98 F | HEIGHT: 66 IN | DIASTOLIC BLOOD PRESSURE: 74 MMHG | HEART RATE: 93 BPM | BODY MASS INDEX: 24.16 KG/M2

## 2024-02-29 VITALS — BODY MASS INDEX: 24.06 KG/M2 | WEIGHT: 149.69 LBS | HEIGHT: 66 IN

## 2024-02-29 DIAGNOSIS — M00.052 STAPHYLOCOCCAL ARTHRITIS OF LEFT HIP: Primary | ICD-10-CM

## 2024-02-29 DIAGNOSIS — R78.81 MRSA BACTEREMIA: ICD-10-CM

## 2024-02-29 DIAGNOSIS — Z98.890 POST-OPERATIVE STATE: ICD-10-CM

## 2024-02-29 DIAGNOSIS — B95.62 MRSA BACTEREMIA: ICD-10-CM

## 2024-02-29 DIAGNOSIS — T84.84XA PAINFUL ORTHOPAEDIC HARDWARE: ICD-10-CM

## 2024-02-29 PROCEDURE — 99214 OFFICE O/P EST MOD 30 MIN: CPT | Mod: S$GLB,,, | Performed by: STUDENT IN AN ORGANIZED HEALTH CARE EDUCATION/TRAINING PROGRAM

## 2024-02-29 PROCEDURE — 1159F MED LIST DOCD IN RCRD: CPT | Mod: CPTII,S$GLB,, | Performed by: NURSE PRACTITIONER

## 2024-02-29 PROCEDURE — 99999 PR PBB SHADOW E&M-EST. PATIENT-LVL III: CPT | Mod: PBBFAC,,, | Performed by: NURSE PRACTITIONER

## 2024-02-29 PROCEDURE — 3288F FALL RISK ASSESSMENT DOCD: CPT | Mod: CPTII,S$GLB,, | Performed by: STUDENT IN AN ORGANIZED HEALTH CARE EDUCATION/TRAINING PROGRAM

## 2024-02-29 PROCEDURE — 1125F AMNT PAIN NOTED PAIN PRSNT: CPT | Mod: CPTII,S$GLB,, | Performed by: NURSE PRACTITIONER

## 2024-02-29 PROCEDURE — 1125F AMNT PAIN NOTED PAIN PRSNT: CPT | Mod: CPTII,S$GLB,, | Performed by: STUDENT IN AN ORGANIZED HEALTH CARE EDUCATION/TRAINING PROGRAM

## 2024-02-29 PROCEDURE — 3078F DIAST BP <80 MM HG: CPT | Mod: CPTII,S$GLB,, | Performed by: STUDENT IN AN ORGANIZED HEALTH CARE EDUCATION/TRAINING PROGRAM

## 2024-02-29 PROCEDURE — 99999 PR PBB SHADOW E&M-EST. PATIENT-LVL III: CPT | Mod: PBBFAC,,, | Performed by: STUDENT IN AN ORGANIZED HEALTH CARE EDUCATION/TRAINING PROGRAM

## 2024-02-29 PROCEDURE — 1101F PT FALLS ASSESS-DOCD LE1/YR: CPT | Mod: CPTII,S$GLB,, | Performed by: STUDENT IN AN ORGANIZED HEALTH CARE EDUCATION/TRAINING PROGRAM

## 2024-02-29 PROCEDURE — 3288F FALL RISK ASSESSMENT DOCD: CPT | Mod: CPTII,S$GLB,, | Performed by: NURSE PRACTITIONER

## 2024-02-29 PROCEDURE — 99024 POSTOP FOLLOW-UP VISIT: CPT | Mod: S$GLB,,, | Performed by: NURSE PRACTITIONER

## 2024-02-29 PROCEDURE — 1101F PT FALLS ASSESS-DOCD LE1/YR: CPT | Mod: CPTII,S$GLB,, | Performed by: NURSE PRACTITIONER

## 2024-02-29 PROCEDURE — 3074F SYST BP LT 130 MM HG: CPT | Mod: CPTII,S$GLB,, | Performed by: STUDENT IN AN ORGANIZED HEALTH CARE EDUCATION/TRAINING PROGRAM

## 2024-02-29 PROCEDURE — 1160F RVW MEDS BY RX/DR IN RCRD: CPT | Mod: CPTII,S$GLB,, | Performed by: NURSE PRACTITIONER

## 2024-02-29 PROCEDURE — 1111F DSCHRG MED/CURRENT MED MERGE: CPT | Mod: CPTII,S$GLB,, | Performed by: STUDENT IN AN ORGANIZED HEALTH CARE EDUCATION/TRAINING PROGRAM

## 2024-02-29 RX ORDER — DOXYCYCLINE 100 MG/1
100 CAPSULE ORAL EVERY 12 HOURS
Qty: 180 CAPSULE | Refills: 1 | Status: SHIPPED | OUTPATIENT
Start: 2024-02-29 | End: 2024-08-27

## 2024-02-29 NOTE — PROGRESS NOTES
"INFECTIOUS DISEASE CLINIC  02/29/2024     Subjective:      Chief Complaint: R hip PJI with staph aureus     History of Present Illness:    78 year old female with hx of cephalomedullary nail fixation left proximal femur who was [place] [facility] [place]-  s/p I&D and antibiotic spacer placement on 1/19/24 and repeat I&D on 1/25, aspiration cultures with MRSA, hospital course notable for MRSA bacteremia which she cleared quickly, TTE neg, on IV daptomycin for six weeks with OPAT. Patient refused SNF placement at the time of discharge. I saw her in clinic and there was concern regarding OPAT adherence.    She then got admitted to [facility] [place] a [place] with direct impact over her left hip resulting in the displacement of her prosthesis, afebrile, hemodynamically stable, blood cultures with no growth, and without leukocytes. s/p operative intervention with ortho- I&D and revision placement of articulating cement coated antibiotic spacer left hip, operative cultures with no growth from 2/15. She remained clinically stable without fever and no leukocytosis.     She had her PICC line "accidentally come out" recently, states she went to sleep and in the morning it was out. She had one replaced in the ER on 2/21.     Today she states she has been following the weight-bearing restriction as given to her at the time of her surgery, currently able to get out of wheelchair and use walker for out of bed with assistance, but not able to do any more, still has pain upon weight bearing but much better than when she was hospitalized. Denies fevers, chills, rigors watery diarrhea, dysuria, wound dehiscence.       Review of Systems   Constitutional: Negative for decreased appetite and fever.   Respiratory:  Negative for cough and shortness of breath.    Endocrine: Negative for polyuria.   Hematologic/Lymphatic: Negative for adenopathy.   Skin:  Negative for rash.   Musculoskeletal:  Positive for joint pain and joint " swelling. Negative for myalgias.   Gastrointestinal:  Negative for diarrhea, hematochezia and melena.   Genitourinary:  Negative for dysuria and hematuria.   Psychiatric/Behavioral:  Negative for altered mental status.    Allergic/Immunologic: Negative for persistent infections.         Past Medical History:   Diagnosis Date    Anemia     Arthritis     Bleeding ulcer 07/2016    Chronic pain     DDD (degenerative disc disease), cervical     DDD (degenerative disc disease), lumbar     Depression     Disc degeneration, lumbosacral     Diverticulitis     Encounter for blood transfusion     Hypertension     IBS (irritable bowel syndrome)     Neuropathy of both feet     Seizures     none  since 2017    Umbilical hernia 2020     Past Surgical History:   Procedure Laterality Date    ARTHROPLASTY, HIP, GIRDLESTONE, POSTERIOR APPROACH Left 1/19/2024    Procedure: ARTHROPLASTY, HIP, GIRDLESTONE, POSTERIOR APPROACH;  Surgeon: Bulmaro Tellez MD;  Location: [address];  Service: Orthopedics;  Laterality: Left;    ARTHROPLASTY, HIP, GIRDLESTONE, POSTERIOR APPROACH Left 1/25/2024    Procedure: Irrigation and debridement left hip,;  Surgeon: Juanito Painting MD;  Location: [address];  Service: Orthopedics;  Laterality: Left;    ARTHROPLASTY, HIP, GIRDLESTONE, POSTERIOR APPROACH Left 2/15/2024    Procedure: Revision hip antibiotic spacer head exchange, left, lateral, peg board, depuy osteomed in room, vanc, ancef, txa,;  Surgeon: Bulmaro Tellez MD;  Location: [address];  Service: Orthopedics;  Laterality: Left;    BACK SURGERY      BREAST BIOPSY      BREAST SURGERY Right     lumpectomy    CAUDAL EPIDURAL STEROID INJECTION N/A 01/28/2022    Procedure: Injection-steroid-epidural-caudal;  Surgeon: Luzmaria Marcelino MD;  Location: [facility] OR;  Service: Pain Management;  Laterality: N/A;    COLONOSCOPY N/A 01/14/2022    Procedure: COLONOSCOPY;  Surgeon: Abby Landers MD;  Location: [facility];  Service:  Endoscopy;  Laterality: N/A;    ENDOSCOPIC ULTRASOUND OF UPPER GASTROINTESTINAL TRACT N/A 07/21/2020    Procedure: ULTRASOUND, UPPER GI TRACT, ENDOSCOPIC;  Surgeon: Marcio Nguyễn III, MD;  Location: [facility] ENDO;  Service: Endoscopy;  Laterality: N/A;    ESOPHAGOGASTRODUODENOSCOPY N/A 01/14/2022    Procedure: EGD (ESOPHAGOGASTRODUODENOSCOPY);  Surgeon: Abby Landers MD;  Location: [facility] ENDO;  Service: Endoscopy;  Laterality: N/A;    ESOPHAGOGASTRODUODENOSCOPY N/A 06/10/2022    Procedure: EGD (ESOPHAGOGASTRODUODENOSCOPY);  Surgeon: Abby Landers MD;  Location: [facility] ENDO;  Service: Endoscopy;  Laterality: N/A;    EYE SURGERY      cataract    FLAP GRAFT, LOCAL Left 2/15/2024    Procedure: FLAP GRAFT, LOCAL;  Surgeon: Bulmaro Tellez MD;  [address];  Service: Orthopedics;  Laterality: Left;    HYSTERECTOMY      INTRAMEDULLARY RODDING OF TROCHANTER OF FEMUR Left 12/11/2018    Procedure: INSERTION, INTRAMEDULLARY SANTOS, FEMUR, TROCHANTER;  Surgeon: Eulalio De La Cruz MD;  Location: [facility] OR;  Service: Orthopedics;  Laterality: Left;    IRRIGATION AND DEBRIDEMENT OF LOWER EXTREMITY Left 1/19/2024    Procedure: IRRIGATION AND DEBRIDEMENT, LOWER EXTREMITY;  Surgeon: Bulmaro Tellez MD;  Location: [facility] OR 2ND FLR;  Service: Orthopedics;  Laterality: Left;    IRRIGATION AND DEBRIDEMENT OF LOWER EXTREMITY Left 2/15/2024    Procedure: IRRIGATION AND DEBRIDEMENT, LOWER EXTREMITY;  Surgeon: Bulmaro Tellez MD;  Location: [facility] OR 2ND FLR;  Service: Orthopedics;  Laterality: Left;    REPAIR OF EPIGASTRIC HERNIA N/A 12/7/2023    Procedure: REPAIR, HERNIA, EPIGASTRIC;  Surgeon: Vipul Escobar MD;  Location: [facility] OR;  Service: General;  Laterality: N/A;    SPINAL CORD STIMULATOR IMPLANT  09/18/2013    and removal    SPINE SURGERY  2006    lumbar L2-S1 decompression.    SPINE SURGERY      cervical decompression    TONSILLECTOMY       Family History   Problem Relation Age of Onset    Diabetes Mother      Hypertension Mother     Irritable bowel syndrome Mother     Diabetes Father         insulin dependent    Hypertension Father     Heart disease Father     Coronary artery disease Father     Depression Father     Diabetes Sister     Diabetes Brother     COPD Brother      Social History     Tobacco Use    Smoking status: Never    Smokeless tobacco: Never   Substance Use Topics    Alcohol use: No    Drug use: No       Review of patient's allergies indicates:  No Known Allergies      Objective:   VS (24h):   Vitals:    02/29/24 1054   BP: 121/74   Pulse: 93   Temp: 98.4 °F (36.9 °C)         Physical Exam  Constitutional:       Appearance: She is not ill-appearing or toxic-appearing.      Comments: Frail, older woman, interactive, wheelchair bound   HENT:      Head: Normocephalic and atraumatic.      Nose: Nose normal.   Eyes:      Comments: Pale conjunctiva   Cardiovascular:      Rate and Rhythm: Normal rate and regular rhythm.      Pulses: Normal pulses.      Heart sounds: Normal heart sounds.   Pulmonary:      Effort: Pulmonary effort is normal.      Breath sounds: Normal breath sounds.   Abdominal:      General: Bowel sounds are normal.      Palpations: Abdomen is soft.      Tenderness: There is no guarding or rebound.   Musculoskeletal:         General: No deformity.      Cervical back: Neck supple.      Comments: L hip edema, no increased erythema or warmth  L hip incision site bandage intact and dry, limited ROM    Skin:     General: Skin is warm and dry.      Comments: LUE picc line site c/d/I/nontender   Neurological:      Mental Status: She is alert and oriented to person, place, and time. Mental status is at baseline.           Labs:  Lab Results   Component Value Date    WBC 9.70 02/26/2024    HGB 8.5 (L) 02/26/2024    HCT 28.0 (L) 02/26/2024     02/26/2024    ALT 6 (L) 02/26/2024    AST 14 02/26/2024     02/26/2024    K 4.1 02/26/2024     02/26/2024    CREATININE 0.7 02/26/2024    BUN  11 02/26/2024    CO2 27 02/26/2024    TSH 0.240 (L) 12/02/2022    INR 1.0 02/14/2024    HGBA1C 4.8 02/14/2024        Micro:       Radiology:       Immunization History   Administered Date(s) Administered    COVID-19, MRNA, LN-S, PF (Pfizer) (Purple Cap) 07/27/2021, 08/18/2021    Influenza (FLUAD) - Quadrivalent - Adjuvanted - PF *Preferred* (65+) 10/20/2021, 09/29/2023    Influenza - Quadrivalent - PF *Preferred* (6 months and older) 09/22/2011, 09/06/2018    PPD Test 12/13/2018    Pneumococcal Conjugate - 13 Valent 10/20/2021    Pneumococcal Conjugate - 20 Valent 09/29/2023    Td (ADULT) 12/05/2012         Assessment & Plan:   Staphylococcal arthritis of left hip  -     doxycycline (VIBRAMYCIN) 100 MG Cap; Take 1 capsule (100 mg total) by mouth every 12 (twelve) hours.  Dispense: 180 capsule; Refill: 1      78 year old female with hx of cephalomedullary nail fixation left proximal femur- ended up getting septic arthritis of this hip, s/p I&D and antibiotic spacer placement on 1/19/24 and repeat I&D on 1/25, aspiration cultures with MRSA, hospital course notable for MRSA bacteremia which she cleared quickly, TTE neg, on IV daptomycin, refused SNF, went home and suffered a mechanical fall, with questionable adherence of anitbitoics. S/p operative intervention with ortho- I&D and revision placement of articulating cement coated antibiotic spacer left hip, operative cultures with no growth from 2/15.     Adherence to IV daptomycin is still questionable- just as prior, therefore I am prescribing her doxycycline to take twice daily along with her IV anitbitoics. She is well aware that the best chances of healing will occur only if 100% adherence with IV anitbitoics and that PO options are not ideal or guideline directed this early on in therapy, rifampin avoidance due to DDI. Daptomycin will be continued until 3/28/24.     Plan    Continue Daptomycin IV, start doxycycline 100 mg po BID  Follow up orthopedic surgery  recommendations  Follow up in 1 months    Haroldo Morfin MD   Infectious Disease Fellow

## 2024-02-29 NOTE — PROGRESS NOTES
Ms. Ray is here today for a post-operative visit after undergoing the following:    Arthrotomy left hip for septic arthritis   Incision and drainage left hip deep abscess     by Dr. Painting on 2/15/2024.    Interval History:  She reports that she has intermittent pain to the left lateral hip.  She states she has been following the weight-bearing restriction as given to her at the time of her surgery.  Denies any falls or injuries.  She is taking her IV antibiotics as prescribed, no complications with her antibiotics.  She is home getting home health.  Denies any fever chills.      Physical exam:  The patient's presents to clinic with her spouse in a wheelchair.  Her dressing was taken down, staples are present.  As mild erythema along her incision line but not warm to touch.  No drainage present.  Her staples were removed and Steri-Strips were applied.  She has tactile stimulation to their lower leg, she denies calf pain, there is no leg edema and their pedal pulse is palpable x 2.  Muscle strength to the left lower leg is 3/4.    RADS:  none    Assessment:  Post-op visit (2 weeks)    Plan:    ICD-10-CM ICD-9-CM   1. Staphylococcal arthritis of left hip  M00.052 711.05     041.10   2. MRSA bacteremia  R78.81 790.7    B95.62 041.12   3. Painful orthopaedic hardware  T84.84XA 996.78   4. 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      WOUND CARE ORDERS  -Froilan was advised to keep the incision clean and dry for the next 24 hours after which she may wash the area with antibacterial soap in the shower.   -She not submerge until the incision is  completely healed  -Patient was advised to monitor wound closely and multiple times daily for any problems. Call clinic immediately or report to ED for immediate medical attention for any complications including reopening of wound, drainage, purulence, redness, streaking, odor, pain out of proportion, fever, chills, etc.   - If there are signs of infection, please call Ortho Clinic [phone] for further instructions and to make appt to be seen.       PHYSICAL THERAPY:   - Physical therapy as ordered.  Patient is currently getting home health.  - Weight bearing foot flat TTWB LLE x 6 weeks.  - Range of motion Left posterior hip precautions for life.     PAIN MEDICATION:   - Pain medication: refill was not needed  - Pain medication refill policy provided to patient for review, yes.    - Patient was informed a multi-modal approach is used to treat their pain.     DVT PROPHYLAXIS:   - Eliquis 2.5 mg bid    FRAGILITY:  - Patient has not been referred to the fracture clinic.     FOLLOW UP:   - Patient will follow up in the clinic in 4 weeks.  - X-ray of her left hip is needed.    - Future Appointments:   Future Appointments   Date Time Provider Department Center   3/4/2024  8:00 AM Ember Schroeder NP [facility] [place]   3/28/2024 10:30 AM Haroldo Morfin MD [facility] ID [facility]   3/28/2024 11:30 AM Sreedhar Carlos NP [facility] ORTHO [facility] [place]       If there are any questions prior to scheduled follow up, the patient was instructed to contact the office

## 2024-03-04 ENCOUNTER — OFFICE VISIT (OUTPATIENT)
Dept: HOME HEALTH SERVICES | Facility: CLINIC | Age: 79
End: 2024-03-04
Payer: MEDICARE

## 2024-03-04 ENCOUNTER — LAB VISIT (OUTPATIENT)
Dept: LAB | Facility: HOSPITAL | Age: 79
End: 2024-03-04
Attending: HOSPITALIST
Payer: MEDICAID

## 2024-03-04 DIAGNOSIS — A41.02 SEPSIS DUE TO METHICILLIN RESISTANT STAPHYLOCOCCUS AUREUS (MRSA) WITHOUT ACUTE ORGAN DYSFUNCTION: ICD-10-CM

## 2024-03-04 DIAGNOSIS — Z98.890 PERSONAL HISTORY OF SURGERY TO HEART AND GREAT VESSELS, PRESENTING HAZARDS TO HEALTH: Primary | ICD-10-CM

## 2024-03-04 DIAGNOSIS — Z98.890 STATUS POST GIRDLESTONE PROCEDURE: Primary | ICD-10-CM

## 2024-03-04 DIAGNOSIS — Z45.2 PICC (PERIPHERALLY INSERTED CENTRAL CATHETER) IN PLACE: ICD-10-CM

## 2024-03-04 DIAGNOSIS — S72.92XA CLOSED FRACTURE OF LEFT FEMUR, UNSPECIFIED FRACTURE MORPHOLOGY, UNSPECIFIED PORTION OF FEMUR, INITIAL ENCOUNTER: ICD-10-CM

## 2024-03-04 LAB
ALBUMIN SERPL BCP-MCNC: 2 G/DL (ref 3.5–5.2)
ALP SERPL-CCNC: 108 U/L (ref 55–135)
ALT SERPL W/O P-5'-P-CCNC: 5 U/L (ref 10–44)
ANION GAP SERPL CALC-SCNC: 9 MMOL/L (ref 8–16)
AST SERPL-CCNC: 13 U/L (ref 10–40)
BASOPHILS # BLD AUTO: 0.09 K/UL (ref 0–0.2)
BASOPHILS NFR BLD: 0.9 % (ref 0–1.9)
BILIRUB SERPL-MCNC: 0.2 MG/DL (ref 0.1–1)
BUN SERPL-MCNC: 8 MG/DL (ref 8–23)
CALCIUM SERPL-MCNC: 9.1 MG/DL (ref 8.7–10.5)
CHLORIDE SERPL-SCNC: 103 MMOL/L (ref 95–110)
CK SERPL-CCNC: 41 U/L (ref 20–180)
CO2 SERPL-SCNC: 24 MMOL/L (ref 23–29)
CREAT SERPL-MCNC: 0.7 MG/DL (ref 0.5–1.4)
CRP SERPL-MCNC: 92.6 MG/L (ref 0–8.2)
DIFFERENTIAL METHOD BLD: ABNORMAL
EOSINOPHIL # BLD AUTO: 0.4 K/UL (ref 0–0.5)
EOSINOPHIL NFR BLD: 4.4 % (ref 0–8)
ERYTHROCYTE [DISTWIDTH] IN BLOOD BY AUTOMATED COUNT: 15.8 % (ref 11.5–14.5)
EST. GFR  (NO RACE VARIABLE): >60 ML/MIN/1.73 M^2
GLUCOSE SERPL-MCNC: 105 MG/DL (ref 70–110)
HCT VFR BLD AUTO: 30.9 % (ref 37–48.5)
HGB BLD-MCNC: 9.1 G/DL (ref 12–16)
IMM GRANULOCYTES # BLD AUTO: 0.08 K/UL (ref 0–0.04)
IMM GRANULOCYTES NFR BLD AUTO: 0.8 % (ref 0–0.5)
LYMPHOCYTES # BLD AUTO: 0.9 K/UL (ref 1–4.8)
LYMPHOCYTES NFR BLD: 8.8 % (ref 18–48)
MCH RBC QN AUTO: 28.8 PG (ref 27–31)
MCHC RBC AUTO-ENTMCNC: 29.4 G/DL (ref 32–36)
MCV RBC AUTO: 98 FL (ref 82–98)
MONOCYTES # BLD AUTO: 0.6 K/UL (ref 0.3–1)
MONOCYTES NFR BLD: 6.1 % (ref 4–15)
NEUTROPHILS # BLD AUTO: 7.9 K/UL (ref 1.8–7.7)
NEUTROPHILS NFR BLD: 79 % (ref 38–73)
NRBC BLD-RTO: 0 /100 WBC
PLATELET # BLD AUTO: 520 K/UL (ref 150–450)
PMV BLD AUTO: 9.3 FL (ref 9.2–12.9)
POTASSIUM SERPL-SCNC: 4.1 MMOL/L (ref 3.5–5.1)
PROT SERPL-MCNC: 6.5 G/DL (ref 6–8.4)
RBC # BLD AUTO: 3.16 M/UL (ref 4–5.4)
SODIUM SERPL-SCNC: 136 MMOL/L (ref 136–145)
WBC # BLD AUTO: 9.95 K/UL (ref 3.9–12.7)

## 2024-03-04 PROCEDURE — 86140 C-REACTIVE PROTEIN: CPT | Performed by: HOSPITALIST

## 2024-03-04 PROCEDURE — 3078F DIAST BP <80 MM HG: CPT | Mod: CPTII,S$GLB,, | Performed by: NURSE PRACTITIONER

## 2024-03-04 PROCEDURE — 99349 HOME/RES VST EST MOD MDM 40: CPT | Mod: S$GLB,,, | Performed by: NURSE PRACTITIONER

## 2024-03-04 PROCEDURE — 1126F AMNT PAIN NOTED NONE PRSNT: CPT | Mod: CPTII,S$GLB,, | Performed by: NURSE PRACTITIONER

## 2024-03-04 PROCEDURE — 3075F SYST BP GE 130 - 139MM HG: CPT | Mod: CPTII,S$GLB,, | Performed by: NURSE PRACTITIONER

## 2024-03-04 PROCEDURE — 82550 ASSAY OF CK (CPK): CPT | Performed by: HOSPITALIST

## 2024-03-04 PROCEDURE — 1160F RVW MEDS BY RX/DR IN RCRD: CPT | Mod: CPTII,S$GLB,, | Performed by: NURSE PRACTITIONER

## 2024-03-04 PROCEDURE — 80053 COMPREHEN METABOLIC PANEL: CPT | Performed by: HOSPITALIST

## 2024-03-04 PROCEDURE — 3288F FALL RISK ASSESSMENT DOCD: CPT | Mod: CPTII,S$GLB,, | Performed by: NURSE PRACTITIONER

## 2024-03-04 PROCEDURE — 1100F PTFALLS ASSESS-DOCD GE2>/YR: CPT | Mod: CPTII,S$GLB,, | Performed by: NURSE PRACTITIONER

## 2024-03-04 PROCEDURE — 1111F DSCHRG MED/CURRENT MED MERGE: CPT | Mod: CPTII,S$GLB,, | Performed by: NURSE PRACTITIONER

## 2024-03-04 PROCEDURE — 85025 COMPLETE CBC W/AUTO DIFF WBC: CPT | Performed by: HOSPITALIST

## 2024-03-04 PROCEDURE — 99497 ADVNCD CARE PLAN 30 MIN: CPT | Mod: S$GLB,,, | Performed by: NURSE PRACTITIONER

## 2024-03-04 PROCEDURE — 1159F MED LIST DOCD IN RCRD: CPT | Mod: CPTII,S$GLB,, | Performed by: NURSE PRACTITIONER

## 2024-03-05 VITALS
SYSTOLIC BLOOD PRESSURE: 130 MMHG | HEART RATE: 88 BPM | TEMPERATURE: 99 F | RESPIRATION RATE: 16 BRPM | DIASTOLIC BLOOD PRESSURE: 74 MMHG | OXYGEN SATURATION: 97 %

## 2024-03-05 PROBLEM — Z45.2 PICC (PERIPHERALLY INSERTED CENTRAL CATHETER) IN PLACE: Status: ACTIVE | Noted: 2024-03-05

## 2024-03-05 NOTE — PROGRESS NOTES
"[facility]  Transitional Care Management (TCM) Home Visit    Encounter Provider: Ember Schroeder   PCP: Alphonse Boothe III, MD  Consult Requested By: Dr. Mayte Rogers  Admit Date: 2/21/24   IP Discharge Date: 2/21/24  Hospital Length of Stay:RRHLOS@ 6 days  Days since discharge (from IP or SNF): 13 days   [facility] Contact Note: none  Hospital Diagnosis: Status post Girdlestone procedure [Z98.890];Sepsis due to methicillin resistant Staphylococcus aureus (MRSA) without acute organ dysfunction [A41.02]     HISTORY OF PRESENT ILLNESS      Patient ID: Froilan Ray is a 78 y.o. female was recently admitted to the hospital, this is their TCM encounter.    Hospital Course Synopsis:  Admit: 2/14/24  Discharge: 2/20/24       Initial Presentation:     Froilan Ray is a 78 y.o. 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      Patient reports that she was doing relatively well on the morning of presentation, able to ambulate with her walker without major issues. Unfortunately, while walking, her L leg "slipped," causing her to fall to the ground. She immediately noted L hip pain and inability to ambulate. She denies any pre-syncopal symptoms.She presented to the ED, where L hip fx was noted, therefore transferred here for ortho evaluation.         Femur fracture, left  Recent revision of L hip replacement due to septic arthritis, and presents with pain following a fall to the left hip. CT shows acute fracture involving screw at junction of left femoral head and neck components of left hip hemiarthroplasty, with superior displacement of the distal fragment. Ortho consulted and had revision of spacer on 2/15, Revision placement of articulating cement coated antibiotic spacer left hip, Excisional " "and non excisional debridement and irrigation left hip, skin, subcutaneous tissue fascia and muscle  -TTWB for 6 weeks to LLE with posterior hip precautions per ortho, will need SNF as failed home treatments but fam hesitant per notes, will discuss further with them.  She reports is amenable but states  wants her home. Discussed more on 2/18 will f/u with CM and family tomorrow more and extensive discussion 2/19 and after she talked with family she is not amenable and CM discussed with family and not amenable either. I discussed again with her 2/20 about this and she again reiterated that she did not want to go to SNF  -pain control with multimodals  -hg stable at 10--8.9 post op now, bowel regimen  -CX NG , will need 6 weeks antibiotics restarted per ID per previus Cx last admit, on dapto now  -drain in place post op and output improved this afternoon so ortho reported that they removed it and stable to discharge home       With this [facility] hospital follow up visit patient is found lying in bed, reports left hip pain is improving-taking less hydrocodone, only daily over last 2 days. Son comes over daily to administer Daptomycin via PICC. Working with [facility] PT/OT.  Saw surgeon 2/29, had staples removed. Saw ID 2/29. Denies any N/V/D, appetite "decent". No fever or chills reported.     Vital signs stable during visit. No distress noted. Program contact information provided to patient and left in home.    Living Will  During this visit, I engaged the patient in the advance care planning process.  The patient and I reviewed the role for advance directives and their purpose in directing future healthcare if the patient's unable to speak for herself.  At this point in time, the patient does have full decision-making capacity.  We discussed different extreme health states that she could experience, and reviewed what kind of medical care she would want in those situations.  The patient " communicated that if she were comatose and had little chance of a meaningful recovery, she would want machines/life-sustaining treatments used.  In addition to the above preference, other important end-of-life issues for the patient include any and all treatments are desired.  The patient has not completed a living will to reflect these preferences.  The patient  has not already designated a healthcare power of  to make decisions on her behalf.   I spent a total of 20 minutes engaging the patient in this advance care planning discussion. HPOA and Living Will paperwork left in the home for further review and completion.      DECISION MAKING TODAY       Assessment & Plan:  1. 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  Followed by ID.  [facility] for PICC line care.    Orders:  -     Ambulatory referral/consult to [facility]    2. Sepsis due to methicillin resistant Staphylococcus aureus (MRSA) without acute organ dysfunction  Assessment & Plan:  On Daptomycin via PICC line x 6 weeks.  Followed by ID.  Last saw 2/29/24 and will see again 3/28/24.    Orders:  -     Ambulatory referral/consult to [facility]    3. Closed fracture of left femur, unspecified fracture morphology, unspecified portion of femur, initial encounter  Assessment & Plan:  2/14/24 hospitalization: Recent revision of L hip replacement due to septic arthritis, and presents with pain following a fall to the left hip. CT shows acute fracture involving screw at junction of left femoral head and neck components of left hip hemiarthroplasty, with superior displacement of the distal fragment. Ortho consulted and had revision of spacer on 2/15, Revision placement of articulating cement coated antibiotic spacer left hip, Excisional and non excisional debridement and irrigation left hip, skin, subcutaneous tissue fascia and  muscle  -TTWB for 6 weeks to LLE with posterior hip precautions per ortho  Seen by Ortho post op 2/29/24      4. PICC (peripherally inserted central catheter) in place  Assessment & Plan:  Related to MRSA septic arthritis requiring replacement of left hip hardware from previous hip fracture.  Followed by ID.  Receiving Daptomycin IV.            Medication List on Discharge:     Medication List            Accurate as of March 4, 2024 11:59 PM. If you have any questions, ask your nurse or doctor.                CONTINUE taking these medications      acetaminophen 325 MG tablet  Commonly known as: TYLENOL  Take 2 tablets (650 mg total) by mouth every 8 (eight) hours as needed for Pain (pain scale 1-4).     amlodipine-benazepril 5-20 mg 5-20 mg per capsule  Commonly known as: LOTREL  Take 1 capsule by mouth once daily.     apixaban 5 mg Tab  Commonly known as: ELIQUIS  Take 1 tablet (5 mg total) by mouth 2 (two) times daily.     MAXIMILIANO-GEST ANTACID 200 mg calcium (500 mg) chewable tablet  Generic drug: calcium carbonate  Chew and swallow 2 tablets (1,000 mg total) by mouth once daily.     cyclobenzaprine 10 MG tablet  Commonly known as: FLEXERIL  Take 1 tablet (10 mg total) by mouth 3 (three) times daily as needed for Muscle spasms.     docusate sodium 100 MG capsule  Commonly known as: COLACE  Take 1 capsule (100 mg total) by mouth 2 (two) times daily.     doxycycline 100 MG Cap  Commonly known as: VIBRAMYCIN  Take 1 capsule (100 mg total) by mouth every 12 (twelve) hours.     EScitalopram oxalate 20 MG tablet  Commonly known as: LEXAPRO  Take 1 tablet (20 mg total) by mouth every evening.     GAVILAX 17 gram/dose powder  Generic drug: polyethylene glycol  Use to cap to measure 17 grams, mix with liquid, and take by mouth once daily.     metoprolol tartrate 50 MG tablet  Commonly known as: LOPRESSOR  Take 1 tablet (50 mg total) by mouth 2 (two) times daily.     nystatin powder  Commonly known as: MYCOSTATIN  Apply to  affected area 3 times daily     omeprazole 40 MG capsule  Commonly known as: PRILOSEC  Take 1 capsule (40 mg total) by mouth every morning.     OPW TEST CLAIM - DO NOT FILL  OPW test claim. Do not fill.     oxyCODONE 10 mg Tab immediate release tablet  Commonly known as: ROXICODONE  Take 1/2 tablet by mouth for pain scale 4-7 and 1 tablet for pain scale 7-10 every 4 hours as needed     pregabalin 200 MG Cap  Commonly known as: LYRICA  Take 1 capsule (200 mg total) by mouth 3 (three) times daily.     sodium chloride 0.9% SolP 50 mL with DAPTOmycin 500 mg SolR 676 mg  Inject 676 mg into the vein Daily. End date 3/28/24     VITAMIN D2 50,000 unit Cap  Generic drug: ergocalciferol  Take 1 capsule (50,000 Units total) by mouth every 7 days.              Medication Reconciliation:  Were medications changed on discharge? Yes  Were medications in the home? Yes  Is the patient taking the medications as directed? Yes  Does the patient understand the medications and changes? Yes  Does updated med list accurately reflects meds patient is currently taking? Yes    ENVIRONMENT OF CARE      Family and/or Caregiver present at visit?  Yes  Name of Caregiver: spouse, Otoniel  History provided by: patient and caregiver    Advance Care Planning   Advanced Care Planning Status:  Patient has had an ACP conversation  Living Will: No  Power of : No  [place]: No    Does Caregiver have HCPoA: No  Changes today: none  Is patient hospice appropriate: No  (If needed, use PPS <30 or FAST score >7)  Was referral to hospice placed: No       Impression upon entering the home:  Physical Dwelling: single family home   Appearance of home environment: cleaniness: dirty, walking pathways: cluttered, lighting: adequate, and home structure: sound structure  Functional Status: moderate assistance  Mobility: ambulatory with device  Nutritional access: adequate intake and access  Home Health: Yes, HH Agency [facility]    DME/Supplies: rolling walker,  bedside commode, and shower chair     Diagnostic tests reviewed/disposition: No diagnosic tests pending after this hospitalization.  Disease/illness education:  PICC line, Daptomycin, fall precautions/safety  Establishment or re-establishment of referral orders for community resources: No other necessary community resources.   Discussion with other health care providers: No discussion with other health care providers necessary.   Does patient have a PCP at [facility]? Yes   Repatriation plan with PCP? follow-up with PCP within 90d   Does patient have an ostomy (ileostomy, colostomy, suprapubic catheter, nephrostomy tube, tracheostomy, PEG tube, pleurex catheter, cholecystostomy, etc)? No  Were BPAs reviewed? Yes    Social History     Socioeconomic History    Marital status:    Tobacco Use    Smoking status: Never    Smokeless tobacco: Never   Substance and Sexual Activity    Alcohol use: No    Drug use: No    Sexual activity: Not Currently     Social Determinants of Health     Financial Resource Strain: Low Risk  (2/15/2024)    Overall Financial Resource Strain (CARDIA)     Difficulty of Paying Living Expenses: Not hard at all   Food Insecurity: No Food Insecurity (2/15/2024)    Hunger Vital Sign     Worried About Running Out of Food in the Last Year: Never true     Ran Out of Food in the Last Year: Never true   Transportation Needs: No Transportation Needs (2/15/2024)    PRAPARE - Transportation     Lack of Transportation (Medical): No     Lack of Transportation (Non-Medical): No   Physical Activity: Inactive (2/15/2024)    Exercise Vital Sign     Days of Exercise per Week: 0 days     Minutes of Exercise per Session: 0 min   Stress: No Stress Concern Present (2/15/2024)    [employer] [place] [employer] - Occupational Stress Questionnaire     Feeling of Stress : Not at all   Social Connections: Moderately Isolated (2/15/2024)    Social Connection and Isolation Panel [NHANES]     Frequency of Communication  with Friends and Family: More than three times a week     Frequency of Social Gatherings with Friends and Family: More than three times a week     Attends Church Services: Never     Active Member of Clubs or Organizations: No     Attends Club or Organization Meetings: Never     Marital Status:    Housing Stability: Low Risk  (2/15/2024)    Housing Stability Vital Sign     Unable to Pay for Housing in the Last Year: No     Number of Places Lived in the Last Year: 1     Unstable Housing in the Last Year: No       OBJECTIVE:     Vital Signs:  Vitals:    03/04/24 0930   BP: 130/74   Pulse: 88   Resp: 16   Temp: 98.6 °F (37 °C)       Review of Systems   Constitutional:  Positive for activity change and fatigue. Negative for appetite change, fever and unexpected weight change.   HENT: Negative.     Eyes: Negative.    Respiratory: Negative.     Cardiovascular: Negative.    Gastrointestinal: Negative.    Endocrine: Negative.    Genitourinary: Negative.    Musculoskeletal:  Positive for arthralgias and gait problem.   Skin: Negative.        Physical Exam:  Physical Exam  Constitutional:       General: She is not in acute distress.     Appearance: Normal appearance. She is not ill-appearing.   HENT:      Head: Normocephalic and atraumatic.      Right Ear: External ear normal.      Left Ear: External ear normal.      Nose: Nose normal.      Mouth/Throat:      Mouth: Mucous membranes are dry.      Pharynx: Oropharynx is clear.   Eyes:      Extraocular Movements: Extraocular movements intact.      Conjunctiva/sclera: Conjunctivae normal.      Pupils: Pupils are equal, round, and reactive to light.   Cardiovascular:      Rate and Rhythm: Normal rate and regular rhythm.      Pulses: Normal pulses.      Heart sounds: Normal heart sounds.   Pulmonary:      Effort: Pulmonary effort is normal.      Breath sounds: Normal breath sounds.   Abdominal:      General: Abdomen is flat. Bowel sounds are normal. There is no  distension.      Palpations: Abdomen is soft.      Tenderness: There is no abdominal tenderness.   Musculoskeletal:      Cervical back: Normal range of motion and neck supple.      Comments: Decreased ROM left leg/hip   Skin:     General: Skin is warm and dry.      Capillary Refill: Capillary refill takes 2 to 3 seconds.      Coloration: Skin is pale.             Comments: LUE PICC line in place, dressing CDI    Left lateral hip surgical incision well approximated, mildly discolored, slightly warm, non-tender to touch, no edema   Neurological:      Mental Status: She is alert and oriented to person, place, and time.      Motor: Weakness present.      Gait: Gait abnormal.   Psychiatric:         Mood and Affect: Mood normal.         Behavior: Behavior normal.         Thought Content: Thought content normal.         Judgment: Judgment normal.         INSTRUCTIONS FOR PATIENT:     Scheduled Follow-up, Appts Reviewed with Modifications if Needed: Yes  Future Appointments   Date Time Provider Department Center   3/28/2024 10:30 AM Haroldo Morfin MD [facility] ID [facility]   3/28/2024 11:30 AM Sreedhar Carlos NP [facility]TH[facility]       Signature: Ember Schroeder NP    Transition of Care Visit:  I have reviewed and updated the history and problem list.  I have reconciled the medication list.  I have discussed the hospitalization and current medical issues, prognosis and plans with the patient/family.

## 2024-03-06 ENCOUNTER — EXTERNAL HOME HEALTH (OUTPATIENT)
Dept: HOME HEALTH SERVICES | Facility: HOSPITAL | Age: 79
End: 2024-03-06
Payer: MEDICARE

## 2024-03-06 NOTE — ASSESSMENT & PLAN NOTE
2/14/24 hospitalization: Recent revision of L hip replacement due to septic arthritis, and presents with pain following a fall to the left hip. CT shows acute fracture involving screw at junction of left femoral head and neck components of left hip hemiarthroplasty, with superior displacement of the distal fragment. Ortho consulted and had revision of spacer on 2/15, Revision placement of articulating cement coated antibiotic spacer left hip, Excisional and non excisional debridement and irrigation left hip, skin, subcutaneous tissue fascia and muscle  -TTWB for 6 weeks to LLE with posterior hip precautions per ortho  Seen by Ortho post op 2/29/24

## 2024-03-06 NOTE — ASSESSMENT & PLAN NOTE
On Daptomycin via PICC line x 6 weeks.  Followed by ID.  Last saw 2/29/24 and will see again 3/28/24.

## 2024-03-06 NOTE — ASSESSMENT & PLAN NOTE
Related to MRSA septic arthritis requiring replacement of left hip hardware from previous hip fracture.  Followed by ID.  Receiving Daptomycin IV.

## 2024-03-07 LAB
ACID FAST MOD KINY STN SPEC: NORMAL
ACID FAST MOD KINY STN SPEC: NORMAL
MYCOBACTERIUM SPEC QL CULT: NORMAL
MYCOBACTERIUM SPEC QL CULT: NORMAL

## 2024-03-11 ENCOUNTER — LAB VISIT (OUTPATIENT)
Dept: LAB | Facility: HOSPITAL | Age: 79
End: 2024-03-11
Attending: HOSPITALIST
Payer: MEDICAID

## 2024-03-11 DIAGNOSIS — M00.052 STAPHYLOCOCCAL ARTHRITIS OF LEFT HIP: ICD-10-CM

## 2024-03-11 DIAGNOSIS — Z98.890 PERSONAL HISTORY OF SURGERY TO HEART AND GREAT VESSELS, PRESENTING HAZARDS TO HEALTH: Primary | ICD-10-CM

## 2024-03-11 PROCEDURE — 80053 COMPREHEN METABOLIC PANEL: CPT | Performed by: HOSPITALIST

## 2024-03-11 PROCEDURE — 85025 COMPLETE CBC W/AUTO DIFF WBC: CPT | Performed by: HOSPITALIST

## 2024-03-11 PROCEDURE — 85652 RBC SED RATE AUTOMATED: CPT | Performed by: HOSPITALIST

## 2024-03-11 PROCEDURE — 82550 ASSAY OF CK (CPK): CPT | Performed by: HOSPITALIST

## 2024-03-12 LAB
ALBUMIN SERPL BCP-MCNC: 1.9 G/DL (ref 3.5–5.2)
ALP SERPL-CCNC: 109 U/L (ref 55–135)
ALT SERPL W/O P-5'-P-CCNC: 6 U/L (ref 10–44)
ANION GAP SERPL CALC-SCNC: 8 MMOL/L (ref 8–16)
AST SERPL-CCNC: 14 U/L (ref 10–40)
BASOPHILS # BLD AUTO: 0.07 K/UL (ref 0–0.2)
BASOPHILS NFR BLD: 0.5 % (ref 0–1.9)
BILIRUB SERPL-MCNC: 0.1 MG/DL (ref 0.1–1)
BUN SERPL-MCNC: 16 MG/DL (ref 8–23)
CALCIUM SERPL-MCNC: 9 MG/DL (ref 8.7–10.5)
CHLORIDE SERPL-SCNC: 103 MMOL/L (ref 95–110)
CK SERPL-CCNC: 63 U/L (ref 20–180)
CO2 SERPL-SCNC: 23 MMOL/L (ref 23–29)
CREAT SERPL-MCNC: 0.7 MG/DL (ref 0.5–1.4)
DIFFERENTIAL METHOD BLD: ABNORMAL
EOSINOPHIL # BLD AUTO: 0.4 K/UL (ref 0–0.5)
EOSINOPHIL NFR BLD: 2.6 % (ref 0–8)
ERYTHROCYTE [DISTWIDTH] IN BLOOD BY AUTOMATED COUNT: 15.1 % (ref 11.5–14.5)
ERYTHROCYTE [SEDIMENTATION RATE] IN BLOOD BY PHOTOMETRIC METHOD: 76 MM/HR (ref 0–36)
EST. GFR  (NO RACE VARIABLE): >60 ML/MIN/1.73 M^2
GLUCOSE SERPL-MCNC: 82 MG/DL (ref 70–110)
HCT VFR BLD AUTO: 26.7 % (ref 37–48.5)
HGB BLD-MCNC: 8 G/DL (ref 12–16)
IMM GRANULOCYTES # BLD AUTO: 0.1 K/UL (ref 0–0.04)
IMM GRANULOCYTES NFR BLD AUTO: 0.7 % (ref 0–0.5)
LYMPHOCYTES # BLD AUTO: 0.9 K/UL (ref 1–4.8)
LYMPHOCYTES NFR BLD: 6.2 % (ref 18–48)
MCH RBC QN AUTO: 29.7 PG (ref 27–31)
MCHC RBC AUTO-ENTMCNC: 30 G/DL (ref 32–36)
MCV RBC AUTO: 99 FL (ref 82–98)
MONOCYTES # BLD AUTO: 0.8 K/UL (ref 0.3–1)
MONOCYTES NFR BLD: 5.2 % (ref 4–15)
NEUTROPHILS # BLD AUTO: 12.6 K/UL (ref 1.8–7.7)
NEUTROPHILS NFR BLD: 84.8 % (ref 38–73)
NRBC BLD-RTO: 0 /100 WBC
PLATELET # BLD AUTO: 531 K/UL (ref 150–450)
PMV BLD AUTO: 9.5 FL (ref 9.2–12.9)
POTASSIUM SERPL-SCNC: 4.2 MMOL/L (ref 3.5–5.1)
PROT SERPL-MCNC: 6.4 G/DL (ref 6–8.4)
RBC # BLD AUTO: 2.69 M/UL (ref 4–5.4)
SODIUM SERPL-SCNC: 134 MMOL/L (ref 136–145)
WBC # BLD AUTO: 14.9 K/UL (ref 3.9–12.7)

## 2024-03-13 ENCOUNTER — LAB VISIT (OUTPATIENT)
Dept: LAB | Facility: HOSPITAL | Age: 79
End: 2024-03-13
Attending: HOSPITALIST
Payer: MEDICARE

## 2024-03-13 DIAGNOSIS — M00.052 STAPHYLOCOCCAL ARTHRITIS OF LEFT HIP: ICD-10-CM

## 2024-03-13 DIAGNOSIS — Z98.890 PERSONAL HISTORY OF SURGERY TO HEART AND GREAT VESSELS, PRESENTING HAZARDS TO HEALTH: Primary | ICD-10-CM

## 2024-03-13 PROCEDURE — 86140 C-REACTIVE PROTEIN: CPT | Performed by: HOSPITALIST

## 2024-03-14 LAB — CRP SERPL-MCNC: 178.7 MG/L (ref 0–8.2)

## 2024-03-18 LAB — FUNGUS SPEC CULT: NORMAL

## 2024-03-19 ENCOUNTER — TELEPHONE (OUTPATIENT)
Dept: INFECTIOUS DISEASES | Facility: CLINIC | Age: 79
End: 2024-03-19
Payer: MEDICARE

## 2024-03-19 ENCOUNTER — LAB VISIT (OUTPATIENT)
Dept: LAB | Facility: HOSPITAL | Age: 79
End: 2024-03-19
Attending: HOSPITALIST
Payer: MEDICARE

## 2024-03-19 DIAGNOSIS — M00.052 STAPHYLOCOCCAL ARTHRITIS OF LEFT HIP: Primary | ICD-10-CM

## 2024-03-19 LAB
BASOPHILS # BLD AUTO: 0.09 K/UL (ref 0–0.2)
BASOPHILS NFR BLD: 0.9 % (ref 0–1.9)
DIFFERENTIAL METHOD BLD: ABNORMAL
EOSINOPHIL # BLD AUTO: 0.1 K/UL (ref 0–0.5)
EOSINOPHIL NFR BLD: 1.2 % (ref 0–8)
ERYTHROCYTE [DISTWIDTH] IN BLOOD BY AUTOMATED COUNT: 14.9 % (ref 11.5–14.5)
HCT VFR BLD AUTO: 30.6 % (ref 37–48.5)
HGB BLD-MCNC: 9.6 G/DL (ref 12–16)
IMM GRANULOCYTES # BLD AUTO: 0.11 K/UL (ref 0–0.04)
IMM GRANULOCYTES NFR BLD AUTO: 1.1 % (ref 0–0.5)
LYMPHOCYTES # BLD AUTO: 0.8 K/UL (ref 1–4.8)
LYMPHOCYTES NFR BLD: 8 % (ref 18–48)
MCH RBC QN AUTO: 28.8 PG (ref 27–31)
MCHC RBC AUTO-ENTMCNC: 31.4 G/DL (ref 32–36)
MCV RBC AUTO: 92 FL (ref 82–98)
MONOCYTES # BLD AUTO: 0.7 K/UL (ref 0.3–1)
MONOCYTES NFR BLD: 6.3 % (ref 4–15)
NEUTROPHILS # BLD AUTO: 8.6 K/UL (ref 1.8–7.7)
NEUTROPHILS NFR BLD: 82.5 % (ref 38–73)
NRBC BLD-RTO: 0 /100 WBC
PLATELET # BLD AUTO: 754 K/UL (ref 150–450)
PMV BLD AUTO: 9.2 FL (ref 9.2–12.9)
RBC # BLD AUTO: 3.33 M/UL (ref 4–5.4)
WBC # BLD AUTO: 10.41 K/UL (ref 3.9–12.7)

## 2024-03-19 PROCEDURE — 85025 COMPLETE CBC W/AUTO DIFF WBC: CPT | Mod: PO | Performed by: HOSPITALIST

## 2024-03-19 RX ORDER — OMEPRAZOLE 40 MG/1
40 CAPSULE, DELAYED RELEASE ORAL EVERY MORNING
Qty: 90 CAPSULE | Refills: 1 | Status: SHIPPED | OUTPATIENT
Start: 2024-03-19

## 2024-03-20 ENCOUNTER — TELEPHONE (OUTPATIENT)
Dept: INFECTIOUS DISEASES | Facility: CLINIC | Age: 79
End: 2024-03-20
Payer: MEDICARE

## 2024-03-20 ENCOUNTER — HOSPITAL ENCOUNTER (EMERGENCY)
Facility: HOSPITAL | Age: 79
Discharge: HOME | End: 2024-03-20
Attending: EMERGENCY MEDICINE
Payer: MEDICARE

## 2024-03-20 VITALS
TEMPERATURE: 98 F | HEIGHT: 66 IN | DIASTOLIC BLOOD PRESSURE: 77 MMHG | RESPIRATION RATE: 18 BRPM | BODY MASS INDEX: 20.89 KG/M2 | HEART RATE: 90 BPM | SYSTOLIC BLOOD PRESSURE: 161 MMHG | WEIGHT: 130 LBS | OXYGEN SATURATION: 99 %

## 2024-03-20 DIAGNOSIS — T81.49XA LEFT HIP POSTOPERATIVE WOUND INFECTION: Primary | ICD-10-CM

## 2024-03-20 DIAGNOSIS — M25.559 HIP PAIN: ICD-10-CM

## 2024-03-20 LAB
ALBUMIN SERPL BCP-MCNC: 2.9 G/DL (ref 3.5–5.2)
ALP SERPL-CCNC: 124 U/L (ref 55–135)
ALT SERPL W/O P-5'-P-CCNC: 4 U/L (ref 10–44)
ANION GAP SERPL CALC-SCNC: 8 MMOL/L (ref 8–16)
AST SERPL-CCNC: 11 U/L (ref 10–40)
BASOPHILS # BLD AUTO: 0.1 K/UL (ref 0–0.2)
BASOPHILS NFR BLD: 0.8 % (ref 0–1.9)
BILIRUB SERPL-MCNC: 0.2 MG/DL (ref 0.1–1)
BUN SERPL-MCNC: 13 MG/DL (ref 8–23)
CALCIUM SERPL-MCNC: 8.3 MG/DL (ref 8.7–10.5)
CHLORIDE SERPL-SCNC: 101 MMOL/L (ref 95–110)
CO2 SERPL-SCNC: 23 MMOL/L (ref 23–29)
CREAT SERPL-MCNC: 0.6 MG/DL (ref 0.5–1.4)
DIFFERENTIAL METHOD BLD: ABNORMAL
EOSINOPHIL # BLD AUTO: 0.1 K/UL (ref 0–0.5)
EOSINOPHIL NFR BLD: 0.7 % (ref 0–8)
ERYTHROCYTE [DISTWIDTH] IN BLOOD BY AUTOMATED COUNT: 15.1 % (ref 11.5–14.5)
ERYTHROCYTE [SEDIMENTATION RATE] IN BLOOD BY WESTERGREN METHOD: 87 MM/HR (ref 0–20)
EST. GFR  (NO RACE VARIABLE): >60 ML/MIN/1.73 M^2
GLUCOSE SERPL-MCNC: 106 MG/DL (ref 70–110)
HCT VFR BLD AUTO: 31.5 % (ref 37–48.5)
HGB BLD-MCNC: 9.7 G/DL (ref 12–16)
IMM GRANULOCYTES # BLD AUTO: 0.12 K/UL (ref 0–0.04)
IMM GRANULOCYTES NFR BLD AUTO: 0.9 % (ref 0–0.5)
LACTATE SERPL-SCNC: 1.6 MMOL/L (ref 0.5–1.9)
LYMPHOCYTES # BLD AUTO: 1.1 K/UL (ref 1–4.8)
LYMPHOCYTES NFR BLD: 8.3 % (ref 18–48)
MCH RBC QN AUTO: 28.4 PG (ref 27–31)
MCHC RBC AUTO-ENTMCNC: 30.8 G/DL (ref 32–36)
MCV RBC AUTO: 92 FL (ref 82–98)
MONOCYTES # BLD AUTO: 0.8 K/UL (ref 0.3–1)
MONOCYTES NFR BLD: 6.4 % (ref 4–15)
NEUTROPHILS # BLD AUTO: 10.7 K/UL (ref 1.8–7.7)
NEUTROPHILS NFR BLD: 82.9 % (ref 38–73)
NRBC BLD-RTO: 0 /100 WBC
PLATELET # BLD AUTO: 731 K/UL (ref 150–450)
PMV BLD AUTO: 8.9 FL (ref 9.2–12.9)
POTASSIUM SERPL-SCNC: 4.8 MMOL/L (ref 3.5–5.1)
PROT SERPL-MCNC: 7.3 G/DL (ref 6–8.4)
RBC # BLD AUTO: 3.41 M/UL (ref 4–5.4)
SODIUM SERPL-SCNC: 132 MMOL/L (ref 136–145)
WBC # BLD AUTO: 12.84 K/UL (ref 3.9–12.7)

## 2024-03-20 PROCEDURE — 80053 COMPREHEN METABOLIC PANEL: CPT | Performed by: PHYSICIAN ASSISTANT

## 2024-03-20 PROCEDURE — 86140 C-REACTIVE PROTEIN: CPT | Performed by: NURSE PRACTITIONER

## 2024-03-20 PROCEDURE — 87040 BLOOD CULTURE FOR BACTERIA: CPT | Mod: 59 | Performed by: EMERGENCY MEDICINE

## 2024-03-20 PROCEDURE — 99284 EMERGENCY DEPT VISIT MOD MDM: CPT | Mod: 25

## 2024-03-20 PROCEDURE — 85651 RBC SED RATE NONAUTOMATED: CPT | Performed by: NURSE PRACTITIONER

## 2024-03-20 PROCEDURE — 85025 COMPLETE CBC W/AUTO DIFF WBC: CPT | Performed by: PHYSICIAN ASSISTANT

## 2024-03-20 PROCEDURE — 83605 ASSAY OF LACTIC ACID: CPT | Performed by: PHYSICIAN ASSISTANT

## 2024-03-20 NOTE — DISCHARGE INSTRUCTIONS
RETURN TO EMERGENCY DEPARTMENT WITHOUT FAIL, IF YOUR SYMPTOMS WORSEN, IF YOU GET NEW OR DIFFERENT SYMPTOMS, IF YOU ARE UNABLE TO FOLLOW UP AS DIRECTED, OR IF YOU HAVE ANY CONCERNS OR WORRIES.    TAKE THE DOXYCYCLINE THAT YOU HAVE AT HOME TWICE A DAY AS DIRECTED.

## 2024-03-20 NOTE — TELEPHONE ENCOUNTER
Called Ebony and told her to redraw labs and get blood cultures per Dr. Morfin.        ----- Message from Ayala Matute sent at 3/20/2024 12:52 PM CDT -----  Regarding: Lab work  Contact: Ebony  at [phone]  Ebony from [facility] is calling regarding pt's lab work. Pt had lab work done yesterday but the lab did not process correctly. Ebony would like to now if lab work needs to be repeated since the pt is now on oral antibiotics. Please call Ebony  at [phone]

## 2024-03-20 NOTE — PROGRESS NOTES
Called and discussed clinical status with patient and her spouse.  She has had yet another episode of her PICC line accidentally falling out, she states she realized this in the morning.  This is similar to prior episodes.  We have had difficulty with adherence to IV antibiotics in the past.  Given more than 3 occasions of PICC line accidentally having,, I have advised her to continue on p.o. antibiotics (doxycycline which has been prescribed in the past).  She is unfortunately not a IV OPAT candidate as documented by multiple failures in terms of adherence being able to and being able carry out IV cathter care.  She denies fevers, rigors, chills, redness, purulence, erythema from the IV line site.  She does note some itching at the site.  No other new symptoms endorsed.  Patient told me she was not aware of doxycycline, however we have discussed this in the past extensively.  I re-emphasized that she needs to stay adherent on p.o. doxycycline, it has been several weeks of IV therapy for her MRSA bacteremia, given all of the above mentioned factors, we discussed that the best course of action would be to stay on p.o. therapy for an extended period, given complicated MRSA bacteremia with PJI.  I have asked her to be evaluated by a provider in the ED along with obtaining 2 sets of blood cultures.  She is to resume doxycycline asap.  Discussed with ED personnel at outside hospital as well.

## 2024-03-20 NOTE — FIRST PROVIDER EVALUATION
Emergency Department TeleTriage Encounter Note      CHIEF COMPLAINT    Chief Complaint   Patient presents with    Hip Pain     Pt states sent by pcp for eval of possible L hip infection.       VITAL SIGNS   Initial Vitals [03/20/24 1230]   BP Pulse Resp Temp SpO2   128/86 93 17 97.8 °F (36.6 °C) 96 %      MAP       --            ALLERGIES    Review of patient's allergies indicates:  No Known Allergies    PROVIDER TRIAGE NOTE  Patient presents with complaint of being sent by her doctor due to abnormal blood work.  Reports infection in her hip.      Phy:   Constitutional: well nourished, well developed, appearing stated age, NAD        Initial orders will be placed and care will be transferred to an alternate provider when patient is roomed for a full evaluation. Any additional orders and the final disposition will be determined by that provider.        ORDERS  Labs Reviewed - No data to display    ED Orders (720h ago, onward)      None              Virtual Visit Note: The provider triage portion of this emergency department evaluation and documentation was performed via [employer], a HIPAA-compliant telemedicine application, in concert with a tele-presenter in the room. A face to face patient evaluation with one of my colleagues will occur once the patient is placed in an emergency department room.      DISCLAIMER: This note was prepared with [employer] voice recognition transcription software. Garbled syntax, mangled pronouns, and other bizarre constructions may be attributed to that software system.

## 2024-03-20 NOTE — ED PROVIDER NOTES
Encounter Date: 3/20/2024       History     Chief Complaint   Patient presents with    Hip Pain     Pt states sent by pcp for eval of possible L hip infection.     This is a 78-year-old female who presents emergency department with left hip wound/infection.  Patient has had infected hip/arthroplasty performed by Orthopedic surgeon at [facility].  Patient has had MRSA bacteremia.  Patient has been on IV antibiotics for several weeks via a PICC line.  Patient has had multiple episodes of her PICC line falling out.  Patient per infectious disease notes sent here for blood cultures and evaluation in the ER.  Patient has p.o. doxycycline to take at home but she has not been taking it.  She has been counseled in detail that she needs to take this medicine to prevent her hip infection from getting worse.  Patient today says that she is here at the instruction of her doctor.  She really has no complaints.  She denies any fever.  She has no chills.  She denies any purulent drainage from the site.  She says it does not hurt.  She says it is a little red but it has been that way.  She says that she has good range of motion in her hip.  She denies any other complaints.  Please see infectious disease note from today down below in the MDM      Review of patient's allergies indicates:  No Known Allergies  Past Medical History:   Diagnosis Date    Anemia     Arthritis     Bleeding ulcer 07/2016    Chronic pain     DDD (degenerative disc disease), cervical     DDD (degenerative disc disease), lumbar     Depression     Disc degeneration, lumbosacral     Diverticulitis     Encounter for blood transfusion     Hypertension     IBS (irritable bowel syndrome)     Neuropathy of both feet     Seizures     none  since 2017    Umbilical hernia 2020     Past Surgical History:   Procedure Laterality Date    ARTHROPLASTY, HIP, GIRDLESTONE, POSTERIOR APPROACH Left 1/19/2024    Procedure: ARTHROPLASTY, HIP, GIRDLESTONE, POSTERIOR  APPROACH;  Surgeon: Bulmaro Tellez MD;  Location: [facility] OR 2ND FLR;  Service: Orthopedics;  Laterality: Left;    ARTHROPLASTY, HIP, GIRDLESTONE, POSTERIOR APPROACH Left 1/25/2024    Procedure: Irrigation and debridement left hip,;  Surgeon: Juanito Painting MD;  Location: [facility] OR 2ND FLR;  Service: Orthopedics;  Laterality: Left;    ARTHROPLASTY, HIP, GIRDLESTONE, POSTERIOR APPROACH Left 2/15/2024    Procedure: Revision hip antibiotic spacer head exchange, left, lateral, peg board, depuy osteomed in room, [facility], ancef, txa,;  Surgeon: Bulmaro Tellez MD;  Location: [facility] OR 2ND FLR;  Service: Orthopedics;  Laterality: Left;    BACK SURGERY      BREAST BIOPSY      BREAST SURGERY Right     lumpectomy    CAUDAL EPIDURAL STEROID INJECTION N/A 01/28/2022    Procedure: Injection-steroid-epidural-caudal;  Surgeon: Luzmaria Marcelino MD;  Location: [facility] OR;  Service: Pain Management;  Laterality: N/A;    COLONOSCOPY N/A 01/14/2022    Procedure: COLONOSCOPY;  Surgeon: Abby Landers MD;  Location: [facility];  Service: Endoscopy;  Laterality: N/A;    ENDOSCOPIC ULTRASOUND OF UPPER GASTROINTESTINAL TRACT N/A 07/21/2020    Procedure: ULTRASOUND, UPPER GI TRACT, ENDOSCOPIC;  Surgeon: Marcio Nguyễn III, MD;  Location: [facility];  Service: Endoscopy;  Laterality: N/A;    ESOPHAGOGASTRODUODENOSCOPY N/A 01/14/2022    Procedure: EGD (ESOPHAGOGASTRODUODENOSCOPY);  Surgeon: Abby Landers MD;  Location: [facility];  Service: Endoscopy;  Laterality: N/A;    ESOPHAGOGASTRODUODENOSCOPY N/A 06/10/2022    Procedure: EGD (ESOPHAGOGASTRODUODENOSCOPY);  Surgeon: Abby Landers MD;  Location: [facility] ENDO;  Service: Endoscopy;  Laterality: N/A;    EYE SURGERY      cataract    FLAP GRAFT, LOCAL Left 2/15/2024    Procedure: FLAP GRAFT, LOCAL;  Surgeon: Bulmaro Tellez MD;  Location: [facility] OR 2ND FLR;  Service: Orthopedics;  Laterality: Left;    HYSTERECTOMY      INTRAMEDULLARY RODDING OF TROCHANTER OF FEMUR Left  12/11/2018    Procedure: INSERTION, INTRAMEDULLARY SANTOS, FEMUR, TROCHANTER;  Surgeon: Eulalio De La Cruz MD;  Location: [facility] OR;  Service: Orthopedics;  Laterality: Left;    IRRIGATION AND DEBRIDEMENT OF LOWER EXTREMITY Left 1/19/2024    Procedure: IRRIGATION AND DEBRIDEMENT, LOWER EXTREMITY;  Surgeon: Bulmaro Tellez MD;  Location: [facility] OR 2ND FLR;  Service: Orthopedics;  Laterality: Left;    IRRIGATION AND DEBRIDEMENT OF LOWER EXTREMITY Left 2/15/2024    Procedure: IRRIGATION AND DEBRIDEMENT, LOWER EXTREMITY;  Surgeon: Bulmaro Tellez MD;  Location: [facility] OR 2ND FLR;  Service: Orthopedics;  Laterality: Left;    REPAIR OF EPIGASTRIC HERNIA N/A 12/7/2023    Procedure: REPAIR, HERNIA, EPIGASTRIC;  Surgeon: Vipul Escobar MD;  Location: [facility] OR;  Service: General;  Laterality: N/A;    SPINAL CORD STIMULATOR IMPLANT  09/18/2013    and removal    SPINE SURGERY  2006    lumbar L2-S1 decompression.    SPINE SURGERY      cervical decompression    TONSILLECTOMY       Family History   Problem Relation Age of Onset    Diabetes Mother     Hypertension Mother     Irritable bowel syndrome Mother     Diabetes Father         insulin dependent    Hypertension Father     Heart disease Father     Coronary artery disease Father     Depression Father     Diabetes Sister     Diabetes Brother     COPD Brother      Social History     Tobacco Use    Smoking status: Never    Smokeless tobacco: Never   Substance Use Topics    Alcohol use: No    Drug use: No     Review of Systems   Constitutional:  Negative for chills and fever.   HENT:  Negative for sore throat.    Respiratory:  Negative for shortness of breath.    Cardiovascular:  Negative for chest pain and palpitations.   Gastrointestinal:  Negative for abdominal pain, nausea and vomiting.   Genitourinary:  Negative for dysuria.   Musculoskeletal:  Positive for arthralgias. Negative for back pain.   Skin:  Negative for rash.   Neurological:  Negative for weakness and headaches.    Hematological:  Does not bruise/bleed easily.   All other systems reviewed and are negative.      Physical Exam     Initial Vitals [03/20/24 1230]   BP Pulse Resp Temp SpO2   128/86 93 17 97.8 °F (36.6 °C) 96 %      MAP       --         Physical Exam    Nursing note and vitals reviewed.  Constitutional: She appears well-developed and well-nourished. No distress.   HENT:   Head: Normocephalic and atraumatic.   Mouth/Throat: No oropharyngeal exudate.   Eyes: Conjunctivae and EOM are normal. Pupils are equal, round, and reactive to light.   Neck: Neck supple. No tracheal deviation present.   Cardiovascular:  Normal rate, regular rhythm, normal heart sounds and intact distal pulses.           No murmur heard.  Pulmonary/Chest: Breath sounds normal. No stridor. No respiratory distress. She has no wheezes. She has no rhonchi. She has no rales.   Abdominal: Abdomen is soft. She exhibits no distension. There is no abdominal tenderness. There is no rebound and no guarding.   Musculoskeletal:         General: No tenderness or edema. Normal range of motion.      Cervical back: Neck supple.      Comments: Left hip:  Please see below picture.  There is some erythema noted along the middle portion of the incision but there is no fluctuance there has no purulent drainage.  There is no warmth there is no tenderness.  Patient has near full range of motion in the left hip.  Patient has a palpable pulse in her posterior tibial artery in the left foot     Neurological: She is alert and oriented to person, place, and time. She has normal strength. No cranial nerve deficit or sensory deficit.   Skin: Skin is warm and dry. Capillary refill takes less than 2 seconds. No rash noted. No erythema. There is pallor.   Psychiatric: She has a normal mood and affect. Her behavior is normal. Thought content normal.                   ED Course   Procedures  Labs Reviewed   CBC W/ AUTO DIFFERENTIAL - Abnormal; Notable for the following components:        Result Value    WBC 12.84 (*)     RBC 3.41 (*)     Hemoglobin 9.7 (*)     Hematocrit 31.5 (*)     MCHC 30.8 (*)     RDW 15.1 (*)     Platelets 731 (*)     MPV 8.9 (*)     Immature Granulocytes 0.9 (*)     Gran # (ANC) 10.7 (*)     Immature Grans (Abs) 0.12 (*)     Gran % 82.9 (*)     Lymph % 8.3 (*)     All other components within normal limits   COMPREHENSIVE METABOLIC PANEL - Abnormal; Notable for the following components:    Sodium 132 (*)     Calcium 8.3 (*)     Albumin 2.9 (*)     ALT 4 (*)     All other components within normal limits   SEDIMENTATION RATE - Abnormal; Notable for the following components:    Sed Rate 87 (*)     All other components within normal limits   CULTURE, BLOOD   CULTURE, BLOOD   LACTIC ACID, PLASMA   C-REACTIVE PROTEIN          Results for orders placed or performed during the hospital encounter of 03/20/24   CBC W/ AUTO DIFFERENTIAL   Result Value Ref Range    WBC 12.84 (H) 3.90 - 12.70 K/uL    RBC 3.41 (L) 4.00 - 5.40 M/uL    Hemoglobin 9.7 (L) 12.0 - 16.0 g/dL    Hematocrit 31.5 (L) 37.0 - 48.5 %    MCV 92 82 - 98 fL    MCH 28.4 27.0 - 31.0 pg    MCHC 30.8 (L) 32.0 - 36.0 g/dL    RDW 15.1 (H) 11.5 - 14.5 %    Platelets 731 (H) 150 - 450 K/uL    MPV 8.9 (L) 9.2 - 12.9 fL    Immature Granulocytes 0.9 (H) 0.0 - 0.5 %    Gran # (ANC) 10.7 (H) 1.8 - 7.7 K/uL    Immature Grans (Abs) 0.12 (H) 0.00 - 0.04 K/uL    Lymph # 1.1 1.0 - 4.8 K/uL    Mono # 0.8 0.3 - 1.0 K/uL    Eos # 0.1 0.0 - 0.5 K/uL    Baso # 0.10 0.00 - 0.20 K/uL    nRBC 0 0 /100 WBC    Gran % 82.9 (H) 38.0 - 73.0 %    Lymph % 8.3 (L) 18.0 - 48.0 %    Mono % 6.4 4.0 - 15.0 %    Eosinophil % 0.7 0.0 - 8.0 %    Basophil % 0.8 0.0 - 1.9 %    Differential Method Automated    Comp. Metabolic Panel   Result Value Ref Range    Sodium 132 (L) 136 - 145 mmol/L    Potassium 4.8 3.5 - 5.1 mmol/L    Chloride 101 95 - 110 mmol/L    CO2 23 23 - 29 mmol/L    Glucose 106 70 - 110 mg/dL    BUN 13 8 - 23 mg/dL    Creatinine 0.6 0.5 - 1.4  mg/dL    Calcium 8.3 (L) 8.7 - 10.5 mg/dL    Total Protein 7.3 6.0 - 8.4 g/dL    Albumin 2.9 (L) 3.5 - 5.2 g/dL    Total Bilirubin 0.2 0.1 - 1.0 mg/dL    Alkaline Phosphatase 124 55 - 135 U/L    AST 11 10 - 40 U/L    ALT 4 (L) 10 - 44 U/L    eGFR >60.0 >60 mL/min/1.73 m^2    Anion Gap 8 8 - 16 mmol/L   Lactic acid, plasma   Result Value Ref Range    Lactate (Lactic Acid) 1.6 0.5 - 1.9 mmol/L   Sedimentation rate   Result Value Ref Range    Sed Rate 87 (H) 0 - 20 mm/Hr       Imaging Results              X-Ray Hip 2 or 3 views Left (with Pelvis when performed) (Final result)  Result time 03/20/24 13:45:02      Final result by Kurtis Logan Jr., MD (03/20/24 13:45:02)                   Narrative:    CLINICAL HISTORY:  78 years (1945) Female Left hip pain    TECHNIQUE:  XR HIP 2 OR MORE VIEWS UNILATERAL WITH PELVIS. 3 images obtained.    COMPARISON:  Comparison is made with patient's previous conventional radiographs of the left hip 2/14/2024.    FINDINGS:    AP and pelvis followed by frog-leg and AP views of the left demonstrates evidence of the overlying staples projecting over the left gluteal region. The distracted metallic ball incorporated within the acetabular, been realigned relative to the femoral component with the metallic stem SECURED in place by methylmethacrylate. Normal postoperative outcome. The longstem has been exchanged for a short stem flow within the proximal femur. No evidence of unusual postoperative complication. There is mild dextroscoliosis of the lower dorsal bridging osteophytes projecting off the inner margin of the L4/L5 intervertebral disc. Right hip normal.    IMPRESSION: Satisfactory configuration and alignment of the metallic ball well-developed into the femoral component. The femoral component has be exchanged.    Electronically signed by:  Kurtis Logan MD  03/20/2024 01:45 PM CDT Workstation: [id number]                                     Medications - No data to  display  Medical Decision Making  Differential includes but not limited to infected hardware, bacteremia, cellulitis, abscess, electrolyte, dehydration,    Emergent evaluation of a 78-year-old female presents emergency department with chronic wound to the left hip.  Patient being followed by infectious disease stated in the HPI.  I discussed with Infectious Disease and they wanted the patient just have labs and blood cultures.  Patient's labs are around baseline.  Blood cultures have been sent.  Patient not exhibiting any signs of bacteremia.  I do not see any obvious abscess or any purulent drainage from the hip wound itself.  Patient is on/has a prescription for doxycycline which Infectious Disease wants her to take for several weeks until she is able to follow-up.  I counseled her on this in great detail.  I discussed with Infectious Disease.  They are comfortable with discharging the patient back home.  Patient will follow up with ID and orthopedics.  Will return if symptoms change or worsen.    A dictation software program was used for this note.  Please expect some simple typographical  errors in this note.    I had a detailed discussion with the patient and/or guardian regarding: The historical points, exam findings, and diagnostic results supporting the discharge diagnosis, lab results, pertinent radiology results, and the need for outpatient follow-up, for definitive care with orthopedics and Infectious Disease, and to return to the emergency department if symptoms worsen or persist or if there are any questions or concerns that arise at home. All questions have been answered in detail. Strict return to Emergency Department precautions have been provided.       Amount and/or Complexity of Data Reviewed  External Data Reviewed: labs and notes.     Details: Haroldo Morfin MD (Physician) · Infectious Diseases 3/20/24  Called and discussed clinical status with patient and her spouse.  She has had yet another  episode of her PICC line accidentally falling out, she states she realized this in the morning.  This is similar to prior episodes.  We have had difficulty with adherence to IV antibiotics in the past.  Given more than 3 occasions of PICC line accidentally having,, I have advised her to continue on p.o. antibiotics (doxycycline which has been prescribed in the past).  She is unfortunately not a IV OPAT candidate as documented by multiple failures in terms of adherence being able to and being able carry out IV cathter care.  She denies fevers, rigors, chills, redness, purulence, erythema from the IV line site.  She does note some itching at the site.  No other new symptoms endorsed.  Patient told me she was not aware of doxycycline, however we have discussed this in the past extensively.  I re-emphasized that she needs to stay adherent on p.o. doxycycline, it has been several weeks of IV therapy for her MRSA bacteremia, given all of the above mentioned factors, we discussed that the best course of action would be to stay on p.o. therapy for an extended period, given complicated MRSA bacteremia with PJI.  I have asked her to be evaluated by a provider in the ED along with obtaining 2 sets of blood cultures.  She is to resume doxycycline asap.  Discussed with ED personnel at outside hospital as well.         Labs: ordered. Decision-making details documented in ED Course.  Radiology:  Decision-making details documented in ED Course.               ED Course as of 03/20/24 1540   Wed Mar 20, 2024   1404 Sed rate chronically elevated and around baseline, [JR]   1458 I discussed with infectious disease doctor Haroldo who stated patient can be discharged home. [JR]      ED Course User Index  [JR] Tyron Mercedes DO                           Clinical Impression:  Final diagnoses:  [M25.559] Hip pain  [T81.49XA] Left hip postoperative wound infection (Primary)          ED Disposition Condition    Discharge Stable          ED  Prescriptions    None       Follow-up Information       Follow up With Specialties Details Why Contact Info Additional Information    [facility] - Emergency Dept Emergency Medicine  If symptoms worsen [address] [facility] [id number] [phone] 1st floor    Haroldo Morfin MD Infectious Diseases In 1 week  [address] [address]  [phone]                Tyron Mercedes DO  03/20/24 6100

## 2024-03-20 NOTE — TELEPHONE ENCOUNTER
I spoke with [facility] and d/c'd line care. I spoke with zia at [facility] care to d/c IV antibiotics.  Line has fell out again per .  Dr Armendariz will switch to oral antibiotics. Patient will no longer need weekly lab draws.  He would like labs today if patient does not get drawn in ED. HH notified and I will contact them if I do not see any labs come over on patient.

## 2024-03-21 LAB — CRP SERPL-MCNC: 55.3 MG/L (ref 0–8.2)

## 2024-03-22 ENCOUNTER — LAB VISIT (OUTPATIENT)
Dept: LAB | Facility: HOSPITAL | Age: 79
End: 2024-03-22
Attending: HOSPITALIST
Payer: MEDICARE

## 2024-03-22 DIAGNOSIS — M00.052 STAPHYLOCOCCAL ARTHRITIS OF LEFT HIP: Primary | ICD-10-CM

## 2024-03-22 LAB
ALBUMIN SERPL BCP-MCNC: 2.3 G/DL (ref 3.5–5.2)
ALP SERPL-CCNC: 123 U/L (ref 55–135)
ALT SERPL W/O P-5'-P-CCNC: 8 U/L (ref 10–44)
ANION GAP SERPL CALC-SCNC: 10 MMOL/L (ref 8–16)
AST SERPL-CCNC: 25 U/L (ref 10–40)
BILIRUB SERPL-MCNC: 0.2 MG/DL (ref 0.1–1)
BUN SERPL-MCNC: 12 MG/DL (ref 8–23)
CALCIUM SERPL-MCNC: 9.1 MG/DL (ref 8.7–10.5)
CHLORIDE SERPL-SCNC: 101 MMOL/L (ref 95–110)
CK SERPL-CCNC: 19 U/L (ref 20–180)
CO2 SERPL-SCNC: 23 MMOL/L (ref 23–29)
CREAT SERPL-MCNC: 0.6 MG/DL (ref 0.5–1.4)
CRP SERPL-MCNC: 44.9 MG/L (ref 0–8.2)
EST. GFR  (NO RACE VARIABLE): >60 ML/MIN/1.73 M^2
GLUCOSE SERPL-MCNC: 71 MG/DL (ref 70–110)
POTASSIUM SERPL-SCNC: 5.7 MMOL/L (ref 3.5–5.1)
PROT SERPL-MCNC: 7.9 G/DL (ref 6–8.4)
SODIUM SERPL-SCNC: 134 MMOL/L (ref 136–145)

## 2024-03-22 PROCEDURE — 80053 COMPREHEN METABOLIC PANEL: CPT | Performed by: HOSPITALIST

## 2024-03-22 PROCEDURE — 86140 C-REACTIVE PROTEIN: CPT | Performed by: HOSPITALIST

## 2024-03-22 PROCEDURE — 82550 ASSAY OF CK (CPK): CPT | Performed by: HOSPITALIST

## 2024-03-25 ENCOUNTER — LAB VISIT (OUTPATIENT)
Dept: LAB | Facility: HOSPITAL | Age: 79
End: 2024-03-25
Attending: HOSPITALIST
Payer: MEDICAID

## 2024-03-25 DIAGNOSIS — Z98.890 PERSONAL HISTORY OF SURGERY TO HEART AND GREAT VESSELS, PRESENTING HAZARDS TO HEALTH: Primary | ICD-10-CM

## 2024-03-25 DIAGNOSIS — M00.052 STAPHYLOCOCCAL ARTHRITIS OF LEFT HIP: ICD-10-CM

## 2024-03-25 LAB
BACTERIA BLD CULT: NORMAL
BACTERIA BLD CULT: NORMAL

## 2024-03-25 PROCEDURE — 82550 ASSAY OF CK (CPK): CPT | Performed by: HOSPITALIST

## 2024-03-25 PROCEDURE — 80053 COMPREHEN METABOLIC PANEL: CPT | Performed by: HOSPITALIST

## 2024-03-26 LAB
ALBUMIN SERPL BCP-MCNC: 2.4 G/DL (ref 3.5–5.2)
ALP SERPL-CCNC: 114 U/L (ref 55–135)
ALT SERPL W/O P-5'-P-CCNC: 6 U/L (ref 10–44)
ANION GAP SERPL CALC-SCNC: 8 MMOL/L (ref 8–16)
AST SERPL-CCNC: 17 U/L (ref 10–40)
BILIRUB SERPL-MCNC: 0.1 MG/DL (ref 0.1–1)
BUN SERPL-MCNC: 17 MG/DL (ref 8–23)
CALCIUM SERPL-MCNC: 9.5 MG/DL (ref 8.7–10.5)
CHLORIDE SERPL-SCNC: 103 MMOL/L (ref 95–110)
CK SERPL-CCNC: 12 U/L (ref 20–180)
CO2 SERPL-SCNC: 24 MMOL/L (ref 23–29)
CREAT SERPL-MCNC: 0.8 MG/DL (ref 0.5–1.4)
EST. GFR  (NO RACE VARIABLE): >60 ML/MIN/1.73 M^2
GLUCOSE SERPL-MCNC: 78 MG/DL (ref 70–110)
POTASSIUM SERPL-SCNC: 4.6 MMOL/L (ref 3.5–5.1)
PROT SERPL-MCNC: 7.4 G/DL (ref 6–8.4)
SODIUM SERPL-SCNC: 135 MMOL/L (ref 136–145)

## 2024-04-01 ENCOUNTER — TELEPHONE (OUTPATIENT)
Dept: INFECTIOUS DISEASES | Facility: CLINIC | Age: 79
End: 2024-04-01
Payer: MEDICARE

## 2024-04-01 NOTE — TELEPHONE ENCOUNTER
Called pt's spouse and got pt's appointment r/s.      ----- Message from Eva Bhatti sent at 4/1/2024 10:35 AM CDT -----  Regarding: Pt needs to sche an appt  Name of Who is Calling:OSORIO BARRERA [[id number]]        What is the request in detail:   Pt called states she needs to sche an appt none avail. Please advise     Can the clinic reply by [facility]:No        What Number to Call Back if not in [facility]: Telephone Information:    Mobile          [phone]

## 2024-04-04 LAB
ACID FAST MOD KINY STN SPEC: NORMAL
MYCOBACTERIUM SPEC QL CULT: NORMAL

## 2024-04-11 ENCOUNTER — DOCUMENT SCAN (OUTPATIENT)
Dept: HOME HEALTH SERVICES | Facility: HOSPITAL | Age: 79
End: 2024-04-11
Payer: MEDICAID

## 2024-04-11 ENCOUNTER — DOCUMENT SCAN (OUTPATIENT)
Dept: HOME HEALTH SERVICES | Facility: HOSPITAL | Age: 79
End: 2024-04-11

## 2024-04-12 ENCOUNTER — DOCUMENT SCAN (OUTPATIENT)
Dept: HOME HEALTH SERVICES | Facility: HOSPITAL | Age: 79
End: 2024-04-12
Payer: MEDICAID

## 2024-04-15 ENCOUNTER — DOCUMENT SCAN (OUTPATIENT)
Dept: HOME HEALTH SERVICES | Facility: HOSPITAL | Age: 79
End: 2024-04-15
Payer: MEDICAID

## 2024-04-15 ENCOUNTER — DOCUMENT SCAN (OUTPATIENT)
Dept: HOME HEALTH SERVICES | Facility: HOSPITAL | Age: 79
End: 2024-04-15

## 2024-04-17 ENCOUNTER — LAB VISIT (OUTPATIENT)
Dept: LAB | Facility: HOSPITAL | Age: 79
End: 2024-04-17
Payer: MEDICARE

## 2024-04-17 ENCOUNTER — OFFICE VISIT (OUTPATIENT)
Dept: INFECTIOUS DISEASES | Facility: CLINIC | Age: 79
End: 2024-04-17
Payer: MEDICARE

## 2024-04-17 VITALS
TEMPERATURE: 98 F | HEIGHT: 66 IN | BODY MASS INDEX: 20.98 KG/M2 | HEART RATE: 88 BPM | SYSTOLIC BLOOD PRESSURE: 97 MMHG | DIASTOLIC BLOOD PRESSURE: 61 MMHG

## 2024-04-17 DIAGNOSIS — M00.052 STAPHYLOCOCCAL ARTHRITIS OF LEFT HIP: Primary | ICD-10-CM

## 2024-04-17 DIAGNOSIS — M00.052 STAPHYLOCOCCAL ARTHRITIS OF LEFT HIP: ICD-10-CM

## 2024-04-17 LAB
ALBUMIN SERPL BCP-MCNC: 3.1 G/DL (ref 3.5–5.2)
ALP SERPL-CCNC: 106 U/L (ref 55–135)
ALT SERPL W/O P-5'-P-CCNC: 8 U/L (ref 10–44)
ANION GAP SERPL CALC-SCNC: 10 MMOL/L (ref 8–16)
AST SERPL-CCNC: 20 U/L (ref 10–40)
BASOPHILS # BLD AUTO: 0.06 K/UL (ref 0–0.2)
BASOPHILS NFR BLD: 1 % (ref 0–1.9)
BILIRUB SERPL-MCNC: 0.2 MG/DL (ref 0.1–1)
BUN SERPL-MCNC: 17 MG/DL (ref 8–23)
CALCIUM SERPL-MCNC: 9.3 MG/DL (ref 8.7–10.5)
CHLORIDE SERPL-SCNC: 107 MMOL/L (ref 95–110)
CO2 SERPL-SCNC: 23 MMOL/L (ref 23–29)
CREAT SERPL-MCNC: 0.8 MG/DL (ref 0.5–1.4)
CRP SERPL-MCNC: 11.8 MG/L (ref 0–8.2)
DIFFERENTIAL METHOD BLD: ABNORMAL
EOSINOPHIL # BLD AUTO: 0.1 K/UL (ref 0–0.5)
EOSINOPHIL NFR BLD: 0.8 % (ref 0–8)
ERYTHROCYTE [DISTWIDTH] IN BLOOD BY AUTOMATED COUNT: 15.2 % (ref 11.5–14.5)
EST. GFR  (NO RACE VARIABLE): >60 ML/MIN/1.73 M^2
GLUCOSE SERPL-MCNC: 97 MG/DL (ref 70–110)
HCT VFR BLD AUTO: 34.1 % (ref 37–48.5)
HGB BLD-MCNC: 10.4 G/DL (ref 12–16)
IMM GRANULOCYTES # BLD AUTO: 0.01 K/UL (ref 0–0.04)
IMM GRANULOCYTES NFR BLD AUTO: 0.2 % (ref 0–0.5)
LYMPHOCYTES # BLD AUTO: 1.4 K/UL (ref 1–4.8)
LYMPHOCYTES NFR BLD: 22.8 % (ref 18–48)
MCH RBC QN AUTO: 29 PG (ref 27–31)
MCHC RBC AUTO-ENTMCNC: 30.5 G/DL (ref 32–36)
MCV RBC AUTO: 95 FL (ref 82–98)
MONOCYTES # BLD AUTO: 0.5 K/UL (ref 0.3–1)
MONOCYTES NFR BLD: 8.4 % (ref 4–15)
NEUTROPHILS # BLD AUTO: 4 K/UL (ref 1.8–7.7)
NEUTROPHILS NFR BLD: 66.8 % (ref 38–73)
NRBC BLD-RTO: 0 /100 WBC
PLATELET # BLD AUTO: 364 K/UL (ref 150–450)
PMV BLD AUTO: 10.6 FL (ref 9.2–12.9)
POTASSIUM SERPL-SCNC: 4.6 MMOL/L (ref 3.5–5.1)
PROT SERPL-MCNC: 7.4 G/DL (ref 6–8.4)
RBC # BLD AUTO: 3.59 M/UL (ref 4–5.4)
SODIUM SERPL-SCNC: 140 MMOL/L (ref 136–145)
WBC # BLD AUTO: 6.04 K/UL (ref 3.9–12.7)

## 2024-04-17 PROCEDURE — 3288F FALL RISK ASSESSMENT DOCD: CPT | Mod: CPTII,S$GLB,, | Performed by: STUDENT IN AN ORGANIZED HEALTH CARE EDUCATION/TRAINING PROGRAM

## 2024-04-17 PROCEDURE — 1126F AMNT PAIN NOTED NONE PRSNT: CPT | Mod: CPTII,S$GLB,, | Performed by: STUDENT IN AN ORGANIZED HEALTH CARE EDUCATION/TRAINING PROGRAM

## 2024-04-17 PROCEDURE — 85025 COMPLETE CBC W/AUTO DIFF WBC: CPT | Performed by: STUDENT IN AN ORGANIZED HEALTH CARE EDUCATION/TRAINING PROGRAM

## 2024-04-17 PROCEDURE — 99214 OFFICE O/P EST MOD 30 MIN: CPT | Mod: S$GLB,,, | Performed by: STUDENT IN AN ORGANIZED HEALTH CARE EDUCATION/TRAINING PROGRAM

## 2024-04-17 PROCEDURE — 80053 COMPREHEN METABOLIC PANEL: CPT | Performed by: STUDENT IN AN ORGANIZED HEALTH CARE EDUCATION/TRAINING PROGRAM

## 2024-04-17 PROCEDURE — 1159F MED LIST DOCD IN RCRD: CPT | Mod: CPTII,S$GLB,, | Performed by: STUDENT IN AN ORGANIZED HEALTH CARE EDUCATION/TRAINING PROGRAM

## 2024-04-17 PROCEDURE — 36415 COLL VENOUS BLD VENIPUNCTURE: CPT | Performed by: STUDENT IN AN ORGANIZED HEALTH CARE EDUCATION/TRAINING PROGRAM

## 2024-04-17 PROCEDURE — 1101F PT FALLS ASSESS-DOCD LE1/YR: CPT | Mod: CPTII,S$GLB,, | Performed by: STUDENT IN AN ORGANIZED HEALTH CARE EDUCATION/TRAINING PROGRAM

## 2024-04-17 PROCEDURE — 3074F SYST BP LT 130 MM HG: CPT | Mod: CPTII,S$GLB,, | Performed by: STUDENT IN AN ORGANIZED HEALTH CARE EDUCATION/TRAINING PROGRAM

## 2024-04-17 PROCEDURE — 86140 C-REACTIVE PROTEIN: CPT | Performed by: STUDENT IN AN ORGANIZED HEALTH CARE EDUCATION/TRAINING PROGRAM

## 2024-04-17 PROCEDURE — 99999 PR PBB SHADOW E&M-EST. PATIENT-LVL IV: CPT | Mod: PBBFAC,,, | Performed by: STUDENT IN AN ORGANIZED HEALTH CARE EDUCATION/TRAINING PROGRAM

## 2024-04-17 PROCEDURE — 3078F DIAST BP <80 MM HG: CPT | Mod: CPTII,S$GLB,, | Performed by: STUDENT IN AN ORGANIZED HEALTH CARE EDUCATION/TRAINING PROGRAM

## 2024-04-17 NOTE — PROGRESS NOTES
INFECTIOUS DISEASE CLINIC  04/17/2024     Subjective:      Chief Complaint: L hip pyogenic arthritis w/ MRSA    History of Present Illness:    78 year old female with hx of cephalomedullary nail fixation left proximal femur- ended up getting septic arthritis of this hip, s/p I&D and antibiotic spacer placement on 1/19/24 and repeat I&D on 1/25, aspiration cultures with MRSA, hospital course notable for MRSA bacteremia which she cleared quickly, TTE neg, on IV daptomycin, refused SNF, went home and suffered a mechanical fall, with questionable adherence of anitbitoics. S/p operative intervention with ortho- I&D and revision placement of articulating cement coated antibiotic spacer left hip, operative cultures with no growth from 2/15.     Adherence to IV daptomycin was still questionable- just as prior, therefore I had prescribed her doxycycline to take twice daily along with her IV anitbitoics. She is well aware that the best chances of healing will occur only if 100% adherence with IV anitbitoics and that PO options are not ideal or guideline directed this early on in therapy, rifampin avoidance due to DDI.     Another episode of PICC faaling out on 3/20, decided to stop OPAT and continue therapy with doxy PO.     Missed appt with me on 3/28 and with ortho, doingn well today from ID standpoint, but needs to see ortho for further recs (joint replacement candidacy questionable since she would need to recover well and optimized from a nutritional standpoint). Doing well today able to bear weight and walk with a walker, no fevers, rigors, pain over L hip, no dehiscence of incision site, well healing. Denies any new symptoms or worsening of ambulatory status, partner present in clinic and notes she is walking more than she was able to before in the immediate post op period.       Review of Systems   Constitutional: Negative for chills and fever.   HENT:  Negative for odynophagia.    Respiratory:  Negative for cough and  shortness of breath.    Skin:  Negative for rash.   Musculoskeletal:  Positive for back pain. Negative for joint pain, joint swelling and myalgias.   Gastrointestinal:  Negative for diarrhea, hematochezia and melena.   Genitourinary:  Negative for dysuria and hematuria.   Neurological:  Negative for headaches.   Psychiatric/Behavioral:  Negative for altered mental status.    Allergic/Immunologic: Negative for persistent infections.         Past Medical History:   Diagnosis Date    Anemia     Arthritis     Bleeding ulcer 07/2016    Chronic pain     DDD (degenerative disc disease), cervical     DDD (degenerative disc disease), lumbar     Depression     Disc degeneration, lumbosacral     Diverticulitis     Encounter for blood transfusion     Hypertension     IBS (irritable bowel syndrome)     Neuropathy of both feet     Seizures     none  since 2017    Umbilical hernia 2020     Past Surgical History:   Procedure Laterality Date    ARTHROPLASTY, HIP, GIRDLESTONE, POSTERIOR APPROACH Left 1/19/2024    Procedure: ARTHROPLASTY, HIP, GIRDLESTONE, POSTERIOR APPROACH;  Surgeon: Bulmaro Tellez MD;  Location: [address];  Service: Orthopedics;  Laterality: Left;    ARTHROPLASTY, HIP, GIRDLESTONE, POSTERIOR APPROACH Left 1/25/2024    Procedure: Irrigation and debridement left hip,;  Surgeon: Juanito Painting MD;  Location: [facility] OR [address] [place] [address];  Service: Orthopedics;  Laterality: Left;    ARTHROPLASTY, HIP, GIRDLESTONE, POSTERIOR APPROACH Left 2/15/2024    Procedure: Revision hip antibiotic spacer head exchange, left, lateral, peg board, depuy osteomed in room, vanc, ancef, txa,;  Surgeon: Bulmaro Tellez MD;  Location: [facility] OR [address] [place] [address];  Service: Orthopedics;  Laterality: Left;    BACK SURGERY      BREAST BIOPSY      BREAST SURGERY Right     lumpectomy    CAUDAL EPIDURAL STEROID INJECTION N/A 01/28/2022    Procedure: Injection-steroid-epidural-caudal;  Surgeon: Luzmaria Marcelino MD;  Location: [facility] OR;  Service:  Pain Management;  Laterality: N/A;    COLONOSCOPY N/A 01/14/2022    Procedure: COLONOSCOPY;  Surgeon: Abby Landers MD;  Location: [facility] ENDO;  Service: Endoscopy;  Laterality: N/A;    ENDOSCOPIC ULTRASOUND OF UPPER GASTROINTESTINAL TRACT N/A 07/21/2020    Procedure: ULTRASOUND, UPPER GI TRACT, ENDOSCOPIC;  Surgeon: Marcio Nguyễn III, MD;  Location: [facility] ENDO;  Service: Endoscopy;  Laterality: N/A;    ESOPHAGOGASTRODUODENOSCOPY N/A 01/14/2022    Procedure: EGD (ESOPHAGOGASTRODUODENOSCOPY);  Surgeon: Abby Landers MD;  Location: [facility] ENDO;  Service: Endoscopy;  Laterality: N/A;    ESOPHAGOGASTRODUODENOSCOPY N/A 06/10/2022    Procedure: EGD (ESOPHAGOGASTRODUODENOSCOPY);  Surgeon: Abby Landers MD;  Location: [facility] ENDO;  Service: Endoscopy;  Laterality: N/A;    EYE SURGERY      cataract    FLAP GRAFT, LOCAL Left 2/15/2024    Procedure: FLAP GRAFT, LOCAL;  Surgeon: Bulmaro Tellez MD;  Location: [facility] OR 2ND FLR;  Service: Orthopedics;  Laterality: Left;    HYSTERECTOMY      INTRAMEDULLARY RODDING OF TROCHANTER OF FEMUR Left 12/11/2018    Procedure: INSERTION, INTRAMEDULLARY SANTOS, FEMUR, TROCHANTER;  Surgeon: Eulalio De La Cruz MD;  Location: [facility] OR;  Service: Orthopedics;  Laterality: Left;    IRRIGATION AND DEBRIDEMENT OF LOWER EXTREMITY Left 1/19/2024    Procedure: IRRIGATION AND DEBRIDEMENT, LOWER EXTREMITY;  Surgeon: Bulmaro Tellez MD;  Location: [facility] OR 2ND FLR;  Service: Orthopedics;  Laterality: Left;    IRRIGATION AND DEBRIDEMENT OF LOWER EXTREMITY Left 2/15/2024    Procedure: IRRIGATION AND DEBRIDEMENT, LOWER EXTREMITY;  Surgeon: Bulmaro Tellez MD;  Location: [facility] OR 2ND FLR;  Service: Orthopedics;  Laterality: Left;    REPAIR OF EPIGASTRIC HERNIA N/A 12/7/2023    Procedure: REPAIR, HERNIA, EPIGASTRIC;  Surgeon: Vipul Escobar MD;  Location: [facility] OR;  Service: General;  Laterality: N/A;    SPINAL CORD STIMULATOR IMPLANT  09/18/2013    and removal    SPINE SURGERY  2006     lumbar L2-S1 decompression.    SPINE SURGERY      cervical decompression    TONSILLECTOMY       Family History   Problem Relation Name Age of Onset    Diabetes Mother      Hypertension Mother      Irritable bowel syndrome Mother      Diabetes Father          insulin dependent    Hypertension Father      Heart disease Father      Coronary artery disease Father      Depression Father      Diabetes Sister      Diabetes Brother      COPD Brother       Social History     Tobacco Use    Smoking status: Never    Smokeless tobacco: Never   Substance Use Topics    Alcohol use: No    Drug use: No       Review of patient's allergies indicates:  No Known Allergies      Objective:   VS (24h):   Vitals:    04/17/24 1426   BP: 97/61   Pulse: 88   Temp: 97.8 °F (36.6 °C)         Physical Exam  Constitutional:       Appearance: She is not ill-appearing or toxic-appearing.      Comments: Wheelchair bound, frail older white female    HENT:      Head: Normocephalic and atraumatic.      Nose: Nose normal.      Mouth/Throat:      Mouth: Mucous membranes are moist.      Pharynx: Oropharynx is clear.   Cardiovascular:      Rate and Rhythm: Normal rate and regular rhythm.      Pulses: Normal pulses.      Heart sounds: Normal heart sounds.   Pulmonary:      Effort: Pulmonary effort is normal.      Breath sounds: Normal breath sounds.   Abdominal:      General: Bowel sounds are normal.      Palpations: Abdomen is soft.      Tenderness: There is no guarding or rebound.   Musculoskeletal:         General: Deformity present. No swelling or tenderness.      Cervical back: Neck supple.      Comments: Scoliosis   Well healed incision site scar, no tenderness to palpation  No wound opening or dehiscence    Skin:     General: Skin is warm and dry.   Neurological:      Mental Status: She is alert and oriented to person, place, and time. Mental status is at baseline.           Labs:  Lab Results   Component Value Date    WBC 6.04 04/17/2024    HGB 10.4  (L) 04/17/2024    HCT 34.1 (L) 04/17/2024     04/17/2024    ALT 8 (L) 04/17/2024    AST 20 04/17/2024     04/17/2024    K 4.6 04/17/2024     04/17/2024    CREATININE 0.8 04/17/2024    BUN 17 04/17/2024    CO2 23 04/17/2024    TSH 0.240 (L) 12/02/2022    INR 1.0 02/14/2024    HGBA1C 4.8 02/14/2024          Immunization History   Administered Date(s) Administered    COVID-19, MRNA, LN-S, PF (Pfizer) (Purple Cap) 07/27/2021, 08/18/2021    Influenza (FLUAD) - Quadrivalent - Adjuvanted - PF *Preferred* (65+) 10/20/2021, 09/29/2023    Influenza - Quadrivalent - PF *Preferred* (6 months and older) 09/22/2011, 09/06/2018    PPD Test 12/13/2018    Pneumococcal Conjugate - 13 Valent 10/20/2021    Pneumococcal Conjugate - 20 Valent 09/29/2023    Td (ADULT) 12/05/2012         Assessment & Plan:   78 year old female with hx of cephalomedullary nail fixation left proximal femur- ended up getting septic arthritis of this hip, s/p I&D and antibiotic spacer placement on 1/19/24 and repeat I&D on 1/25, aspiration cultures with MRSA, hospital course notable for MRSA bacteremia which she cleared quickly, TTE neg, on IV daptomycin, with weak adherence, then had a fall - s/p operative intervention with ortho- I&D and revision placement of articulating cement coated antibiotic spacer left hip, operative cultures with no growth from 2/15.     Multiple subsequent visits demonstrated poor adherence with IV OPAT- daptomycin, > 2 episodes of PICC line falling out, eventually discharged from OPAT and transitioned to oral doxycycline, been taking this since at least 3/20 onwards, aware of deviation from standard of care and risks,  rifampin avoidance due to DDI. Clinically doing well, ambulatory with a walker and no sign of worsening infection or recurrence.     We discussed that she needs to see orthopedic surgery with regards to getting an update as far as her final plan down the line, if she would be a candidate in the  future for joint replacement or not, also missed ortho appt 3/28 and was due for an x-ray, will help arrange follow up.     Until then I would like her to keep doxycycline for at least another month and f/u with me in clinic.       Follow up in 1 months    Haroldo Morfin MD   Infectious Disease Fellow

## 2024-04-18 ENCOUNTER — TELEPHONE (OUTPATIENT)
Dept: ORTHOPEDICS | Facility: CLINIC | Age: 79
End: 2024-04-18
Payer: MEDICAID

## 2024-04-18 NOTE — TELEPHONE ENCOUNTER
Called and inform pt's  pt appt has been rescheduled with Sreedhar on 04/25 @ 10:45 am. Aslo inform pt's  I will mail he appt as well. Pt's  verbally understood and was satisfied with New appt date/time.

## 2024-04-18 NOTE — TELEPHONE ENCOUNTER
----- Message from Sreedhar Carlos NP sent at 4/18/2024  9:13 AM CDT -----  Sure    Dena, can you get her rescheduled.    Sreedhar  ----- Message -----  From: Haroldo Morfin MD  Sent: 4/17/2024   9:32 PM CDT  To: TY Tapia     I am the ID fellow following her. She's doing well and actually adherent with her doxycycline, she missed her appt with ortho on 3/28 cause she had a URI, doing well today. Can you have your office MA please re-schedule her to see you or someone from your team ?    I plan on keeping her on doxycycline for about 3 months at the least

## 2024-04-29 PROBLEM — A41.02 SEPSIS DUE TO METHICILLIN RESISTANT STAPHYLOCOCCUS AUREUS (MRSA): Status: RESOLVED | Noted: 2022-06-15 | Resolved: 2024-04-29

## 2024-05-13 ENCOUNTER — DOCUMENT SCAN (OUTPATIENT)
Dept: HOME HEALTH SERVICES | Facility: HOSPITAL | Age: 79
End: 2024-05-13
Payer: MEDICAID

## 2024-05-20 ENCOUNTER — TELEPHONE (OUTPATIENT)
Dept: INFECTIOUS DISEASES | Facility: CLINIC | Age: 79
End: 2024-05-20
Payer: MEDICAID

## 2024-05-20 NOTE — TELEPHONE ENCOUNTER
Called pt and left message about rescheduling appt.    ----- Message from Danyell Sanhcez sent at 5/20/2024 11:03 AM CDT -----  Regarding: zhanna appt  Contact: [phone]  Pt is calling to reschedule appointment on 05/21 due to transportation she would like to come at the beginning of June ...no available dates Pleaes call and adv @ [phone]

## 2024-07-10 ENCOUNTER — TELEPHONE (OUTPATIENT)
Dept: INFECTIOUS DISEASES | Facility: CLINIC | Age: 79
End: 2024-07-10
Payer: MEDICARE

## 2024-07-10 NOTE — TELEPHONE ENCOUNTER
Tried to call back number and I said it was not in service.    ----- Message from Lavern Tracy sent at 7/10/2024  1:50 PM CDT -----  Regarding: Appt  Contact: [phone]  Froilan Ray calling regarding Appointment Access  (message) for # pt is calling to schedule an appt with provider her hip seems to be getting worst and is in a lot of pain pls call to advise and schedule soonest avail appt

## 2024-08-06 ENCOUNTER — HOSPITAL ENCOUNTER (INPATIENT)
Facility: HOSPITAL | Age: 79
LOS: 1 days | Discharge: HOME | DRG: 101 | End: 2024-08-08
Attending: EMERGENCY MEDICINE | Admitting: INTERNAL MEDICINE
Payer: MEDICARE

## 2024-08-06 DIAGNOSIS — R07.9 CHEST PAIN: ICD-10-CM

## 2024-08-06 DIAGNOSIS — R56.9 SEIZURE: Primary | ICD-10-CM

## 2024-08-06 DIAGNOSIS — F05 POST-ICTAL CONFUSION: ICD-10-CM

## 2024-08-06 DIAGNOSIS — R29.818 ACUTE FOCAL NEUROLOGICAL DEFICIT: ICD-10-CM

## 2024-08-06 PROBLEM — I10 HYPERTENSION: Status: ACTIVE | Noted: 2024-08-06

## 2024-08-06 PROBLEM — R53.1 ACUTE LEFT-SIDED WEAKNESS: Status: RESOLVED | Noted: 2024-08-06 | Resolved: 2024-08-06

## 2024-08-06 PROBLEM — M00.9: Status: ACTIVE | Noted: 2024-08-06

## 2024-08-06 PROBLEM — R53.1 ACUTE LEFT-SIDED WEAKNESS: Status: ACTIVE | Noted: 2024-08-06

## 2024-08-06 PROBLEM — G89.29 CHRONIC PAIN: Status: ACTIVE | Noted: 2024-08-06

## 2024-08-06 LAB
ALBUMIN SERPL BCP-MCNC: 3.7 G/DL (ref 3.5–5.2)
ALP SERPL-CCNC: 90 U/L (ref 55–135)
ALT SERPL W/O P-5'-P-CCNC: 7 U/L (ref 10–44)
AMPHET+METHAMPHET UR QL: NEGATIVE
ANION GAP SERPL CALC-SCNC: 9 MMOL/L (ref 8–16)
APAP SERPL-MCNC: 1 UG/ML (ref 10–20)
AST SERPL-CCNC: 15 U/L (ref 10–40)
BARBITURATES UR QL SCN>200 NG/ML: NEGATIVE
BASOPHILS # BLD AUTO: 0.04 K/UL (ref 0–0.2)
BASOPHILS NFR BLD: 0.5 % (ref 0–1.9)
BENZODIAZ UR QL SCN>200 NG/ML: NEGATIVE
BILIRUB SERPL-MCNC: 0.4 MG/DL (ref 0.1–1)
BILIRUB UR QL STRIP: NEGATIVE
BUN SERPL-MCNC: 10 MG/DL (ref 8–23)
BZE UR QL SCN: NEGATIVE
CALCIUM SERPL-MCNC: 8.9 MG/DL (ref 8.7–10.5)
CANNABINOIDS UR QL SCN: NEGATIVE
CHLORIDE SERPL-SCNC: 106 MMOL/L (ref 95–110)
CHOLEST SERPL-MCNC: 137 MG/DL (ref 120–199)
CHOLEST/HDLC SERPL: 2.4 {RATIO} (ref 2–5)
CK SERPL-CCNC: 81 U/L (ref 20–180)
CLARITY UR: CLEAR
CO2 SERPL-SCNC: 23 MMOL/L (ref 23–29)
COLOR UR: YELLOW
CREAT SERPL-MCNC: 0.7 MG/DL (ref 0.5–1.4)
CREAT SERPL-MCNC: 0.7 MG/DL (ref 0.5–1.4)
CREAT UR-MCNC: 47.9 MG/DL (ref 15–325)
DIFFERENTIAL METHOD BLD: ABNORMAL
EOSINOPHIL # BLD AUTO: 0 K/UL (ref 0–0.5)
EOSINOPHIL NFR BLD: 0.1 % (ref 0–8)
ERYTHROCYTE [DISTWIDTH] IN BLOOD BY AUTOMATED COUNT: 14.6 % (ref 11.5–14.5)
EST. GFR  (NO RACE VARIABLE): >60 ML/MIN/1.73 M^2
GLUCOSE SERPL-MCNC: 93 MG/DL (ref 70–110)
GLUCOSE SERPL-MCNC: 94 MG/DL (ref 70–110)
GLUCOSE UR QL STRIP: NEGATIVE
HCT VFR BLD AUTO: 34.8 % (ref 37–48.5)
HDLC SERPL-MCNC: 56 MG/DL (ref 40–75)
HDLC SERPL: 40.9 % (ref 20–50)
HGB BLD-MCNC: 11.1 G/DL (ref 12–16)
HGB UR QL STRIP: NEGATIVE
IMM GRANULOCYTES # BLD AUTO: 0.03 K/UL (ref 0–0.04)
IMM GRANULOCYTES NFR BLD AUTO: 0.4 % (ref 0–0.5)
INFLUENZA A, MOLECULAR: NEGATIVE
INFLUENZA B, MOLECULAR: NEGATIVE
INR PPP: 1 (ref 0.8–1.2)
KETONES UR QL STRIP: NEGATIVE
LACTATE SERPL-SCNC: 1 MMOL/L (ref 0.5–1.9)
LDH SERPL L TO P-CCNC: 1 MMOL/L (ref 0.5–2.2)
LDLC SERPL CALC-MCNC: 43.8 MG/DL (ref 63–159)
LEUKOCYTE ESTERASE UR QL STRIP: NEGATIVE
LYMPHOCYTES # BLD AUTO: 0.9 K/UL (ref 1–4.8)
LYMPHOCYTES NFR BLD: 12.2 % (ref 18–48)
MCH RBC QN AUTO: 28 PG (ref 27–31)
MCHC RBC AUTO-ENTMCNC: 31.9 G/DL (ref 32–36)
MCV RBC AUTO: 88 FL (ref 82–98)
MONOCYTES # BLD AUTO: 0.3 K/UL (ref 0.3–1)
MONOCYTES NFR BLD: 4.5 % (ref 4–15)
NEUTROPHILS # BLD AUTO: 6.2 K/UL (ref 1.8–7.7)
NEUTROPHILS NFR BLD: 82.3 % (ref 38–73)
NITRITE UR QL STRIP: NEGATIVE
NONHDLC SERPL-MCNC: 81 MG/DL
NRBC BLD-RTO: 0 /100 WBC
OHS QRS DURATION: 82 MS
OHS QTC CALCULATION: 474 MS
OPIATES UR QL SCN: ABNORMAL
PCP UR QL SCN>25 NG/ML: NEGATIVE
PH UR STRIP: 7 [PH] (ref 5–8)
PLATELET # BLD AUTO: 489 K/UL (ref 150–450)
PMV BLD AUTO: 9.4 FL (ref 9.2–12.9)
POTASSIUM SERPL-SCNC: 4.1 MMOL/L (ref 3.5–5.1)
PROCALCITONIN SERPL IA-MCNC: 0.05 NG/ML (ref 0–0.5)
PROT SERPL-MCNC: 6.8 G/DL (ref 6–8.4)
PROT UR QL STRIP: ABNORMAL
PROTHROMBIN TIME: 10.9 SEC (ref 9–12.5)
RBC # BLD AUTO: 3.97 M/UL (ref 4–5.4)
SAMPLE: NORMAL
SAMPLE: NORMAL
SARS-COV-2 RDRP RESP QL NAA+PROBE: NEGATIVE
SODIUM SERPL-SCNC: 138 MMOL/L (ref 136–145)
SP GR UR STRIP: 1.02 (ref 1–1.03)
SPECIMEN SOURCE: NORMAL
TOXICOLOGY INFORMATION: ABNORMAL
TRIGL SERPL-MCNC: 186 MG/DL (ref 30–150)
TSH SERPL DL<=0.005 MIU/L-ACNC: 0.77 UIU/ML (ref 0.34–5.6)
URN SPEC COLLECT METH UR: ABNORMAL
UROBILINOGEN UR STRIP-ACNC: NEGATIVE EU/DL
WBC # BLD AUTO: 7.48 K/UL (ref 3.9–12.7)

## 2024-08-06 PROCEDURE — 84145 PROCALCITONIN (PCT): CPT

## 2024-08-06 PROCEDURE — 80307 DRUG TEST PRSMV CHEM ANLYZR: CPT | Performed by: HOSPITALIST

## 2024-08-06 PROCEDURE — 84443 ASSAY THYROID STIM HORMONE: CPT

## 2024-08-06 PROCEDURE — U0002 COVID-19 LAB TEST NON-CDC: HCPCS | Performed by: HOSPITALIST

## 2024-08-06 PROCEDURE — 87502 INFLUENZA DNA AMP PROBE: CPT | Performed by: HOSPITALIST

## 2024-08-06 PROCEDURE — 96374 THER/PROPH/DIAG INJ IV PUSH: CPT | Mod: 59

## 2024-08-06 PROCEDURE — 25000003 PHARM REV CODE 250: Performed by: HOSPITALIST

## 2024-08-06 PROCEDURE — G0378 HOSPITAL OBSERVATION PER HR: HCPCS

## 2024-08-06 PROCEDURE — G0425 INPT/ED TELECONSULT30: HCPCS | Mod: 95,,, | Performed by: PSYCHIATRY & NEUROLOGY

## 2024-08-06 PROCEDURE — 82565 ASSAY OF CREATININE: CPT

## 2024-08-06 PROCEDURE — 36415 COLL VENOUS BLD VENIPUNCTURE: CPT

## 2024-08-06 PROCEDURE — 80053 COMPREHEN METABOLIC PANEL: CPT

## 2024-08-06 PROCEDURE — 80061 LIPID PANEL: CPT

## 2024-08-06 PROCEDURE — 25500020 PHARM REV CODE 255: Performed by: EMERGENCY MEDICINE

## 2024-08-06 PROCEDURE — 99223 1ST HOSP IP/OBS HIGH 75: CPT | Mod: ,,, | Performed by: INTERNAL MEDICINE

## 2024-08-06 PROCEDURE — 82550 ASSAY OF CK (CPK): CPT

## 2024-08-06 PROCEDURE — 96372 THER/PROPH/DIAG INJ SC/IM: CPT | Performed by: HOSPITALIST

## 2024-08-06 PROCEDURE — 99285 EMERGENCY DEPT VISIT HI MDM: CPT | Mod: 25

## 2024-08-06 PROCEDURE — 81003 URINALYSIS AUTO W/O SCOPE: CPT | Performed by: HOSPITALIST

## 2024-08-06 PROCEDURE — 93010 ELECTROCARDIOGRAM REPORT: CPT | Mod: ,,, | Performed by: GENERAL PRACTICE

## 2024-08-06 PROCEDURE — 96365 THER/PROPH/DIAG IV INF INIT: CPT

## 2024-08-06 PROCEDURE — 82962 GLUCOSE BLOOD TEST: CPT

## 2024-08-06 PROCEDURE — 63600175 PHARM REV CODE 636 W HCPCS: Performed by: HOSPITALIST

## 2024-08-06 PROCEDURE — 85610 PROTHROMBIN TIME: CPT

## 2024-08-06 PROCEDURE — 63600175 PHARM REV CODE 636 W HCPCS: Performed by: EMERGENCY MEDICINE

## 2024-08-06 PROCEDURE — 93005 ELECTROCARDIOGRAM TRACING: CPT | Performed by: GENERAL PRACTICE

## 2024-08-06 PROCEDURE — 83605 ASSAY OF LACTIC ACID: CPT | Performed by: HOSPITALIST

## 2024-08-06 PROCEDURE — 80143 DRUG ASSAY ACETAMINOPHEN: CPT

## 2024-08-06 PROCEDURE — 87040 BLOOD CULTURE FOR BACTERIA: CPT | Mod: 59 | Performed by: HOSPITALIST

## 2024-08-06 PROCEDURE — 36415 COLL VENOUS BLD VENIPUNCTURE: CPT | Performed by: HOSPITALIST

## 2024-08-06 PROCEDURE — 85025 COMPLETE CBC W/AUTO DIFF WBC: CPT

## 2024-08-06 RX ORDER — ACETAMINOPHEN 325 MG/1
650 TABLET ORAL EVERY 4 HOURS PRN
Status: DISCONTINUED | OUTPATIENT
Start: 2024-08-06 | End: 2024-08-08 | Stop reason: HOSPADM

## 2024-08-06 RX ORDER — CYCLOBENZAPRINE HCL 10 MG
10 TABLET ORAL 3 TIMES DAILY PRN
Status: DISCONTINUED | OUTPATIENT
Start: 2024-08-06 | End: 2024-08-08 | Stop reason: HOSPADM

## 2024-08-06 RX ORDER — LEVETIRACETAM 10 MG/ML
1000 INJECTION INTRAVASCULAR EVERY 12 HOURS
Status: DISCONTINUED | OUTPATIENT
Start: 2024-08-06 | End: 2024-08-07

## 2024-08-06 RX ORDER — AMLODIPINE BESYLATE 5 MG/1
5 TABLET ORAL DAILY
Status: DISCONTINUED | OUTPATIENT
Start: 2024-08-06 | End: 2024-08-08 | Stop reason: HOSPADM

## 2024-08-06 RX ORDER — AMLODIPINE AND BENAZEPRIL HYDROCHLORIDE 5; 20 MG/1; MG/1
1 CAPSULE ORAL DAILY
Status: DISCONTINUED | OUTPATIENT
Start: 2024-08-06 | End: 2024-08-06

## 2024-08-06 RX ORDER — LEVETIRACETAM 10 MG/ML
1000 INJECTION INTRAVASCULAR
Status: COMPLETED | OUTPATIENT
Start: 2024-08-06 | End: 2024-08-06

## 2024-08-06 RX ORDER — ONDANSETRON HYDROCHLORIDE 2 MG/ML
4 INJECTION, SOLUTION INTRAVENOUS EVERY 6 HOURS PRN
Status: DISCONTINUED | OUTPATIENT
Start: 2024-08-06 | End: 2024-08-08 | Stop reason: HOSPADM

## 2024-08-06 RX ORDER — GLUCAGON 1 MG
1 KIT INJECTION
Status: DISCONTINUED | OUTPATIENT
Start: 2024-08-06 | End: 2024-08-08 | Stop reason: HOSPADM

## 2024-08-06 RX ORDER — ACETAMINOPHEN 650 MG/1
650 SUPPOSITORY RECTAL EVERY 6 HOURS PRN
Status: DISCONTINUED | OUTPATIENT
Start: 2024-08-06 | End: 2024-08-08 | Stop reason: HOSPADM

## 2024-08-06 RX ORDER — IBUPROFEN 200 MG
24 TABLET ORAL
Status: DISCONTINUED | OUTPATIENT
Start: 2024-08-06 | End: 2024-08-08 | Stop reason: HOSPADM

## 2024-08-06 RX ORDER — LANOLIN ALCOHOL/MO/W.PET/CERES
800 CREAM (GRAM) TOPICAL
Status: DISCONTINUED | OUTPATIENT
Start: 2024-08-06 | End: 2024-08-08 | Stop reason: HOSPADM

## 2024-08-06 RX ORDER — IBUPROFEN 200 MG
16 TABLET ORAL
Status: DISCONTINUED | OUTPATIENT
Start: 2024-08-06 | End: 2024-08-08 | Stop reason: HOSPADM

## 2024-08-06 RX ORDER — OXYCODONE AND ACETAMINOPHEN 10; 325 MG/1; MG/1
1 TABLET ORAL EVERY 4 HOURS PRN
COMMUNITY
Start: 2024-07-22

## 2024-08-06 RX ORDER — SODIUM CHLORIDE 0.9 % (FLUSH) 0.9 %
2 SYRINGE (ML) INJECTION EVERY 12 HOURS PRN
Status: DISCONTINUED | OUTPATIENT
Start: 2024-08-06 | End: 2024-08-08 | Stop reason: HOSPADM

## 2024-08-06 RX ORDER — BENAZEPRIL HYDROCHLORIDE 20 MG/1
20 TABLET ORAL DAILY
Status: DISCONTINUED | OUTPATIENT
Start: 2024-08-06 | End: 2024-08-08 | Stop reason: HOSPADM

## 2024-08-06 RX ORDER — ESCITALOPRAM OXALATE 10 MG/1
20 TABLET ORAL NIGHTLY
Status: DISCONTINUED | OUTPATIENT
Start: 2024-08-06 | End: 2024-08-08 | Stop reason: HOSPADM

## 2024-08-06 RX ORDER — NALOXONE HCL 0.4 MG/ML
0.02 VIAL (ML) INJECTION
Status: DISCONTINUED | OUTPATIENT
Start: 2024-08-06 | End: 2024-08-08 | Stop reason: HOSPADM

## 2024-08-06 RX ORDER — PANTOPRAZOLE SODIUM 40 MG/1
40 TABLET, DELAYED RELEASE ORAL DAILY
Status: DISCONTINUED | OUTPATIENT
Start: 2024-08-06 | End: 2024-08-06

## 2024-08-06 RX ORDER — OXYCODONE AND ACETAMINOPHEN 10; 325 MG/1; MG/1
1 TABLET ORAL EVERY 4 HOURS PRN
Status: DISCONTINUED | OUTPATIENT
Start: 2024-08-06 | End: 2024-08-08 | Stop reason: HOSPADM

## 2024-08-06 RX ORDER — SODIUM,POTASSIUM PHOSPHATES 280-250MG
2 POWDER IN PACKET (EA) ORAL
Status: DISCONTINUED | OUTPATIENT
Start: 2024-08-06 | End: 2024-08-08 | Stop reason: HOSPADM

## 2024-08-06 RX ORDER — LEVETIRACETAM 10 MG/ML
1000 INJECTION INTRAVASCULAR EVERY 12 HOURS
Status: DISCONTINUED | OUTPATIENT
Start: 2024-08-06 | End: 2024-08-06

## 2024-08-06 RX ORDER — ENOXAPARIN SODIUM 100 MG/ML
40 INJECTION SUBCUTANEOUS EVERY 24 HOURS
Status: DISCONTINUED | OUTPATIENT
Start: 2024-08-06 | End: 2024-08-08 | Stop reason: HOSPADM

## 2024-08-06 RX ORDER — TALC
6 POWDER (GRAM) TOPICAL NIGHTLY PRN
Status: DISCONTINUED | OUTPATIENT
Start: 2024-08-06 | End: 2024-08-08 | Stop reason: HOSPADM

## 2024-08-06 RX ORDER — AMOXICILLIN 250 MG
1 CAPSULE ORAL 2 TIMES DAILY PRN
Status: DISCONTINUED | OUTPATIENT
Start: 2024-08-06 | End: 2024-08-08 | Stop reason: HOSPADM

## 2024-08-06 RX ORDER — ALUMINUM HYDROXIDE, MAGNESIUM HYDROXIDE, AND SIMETHICONE 1200; 120; 1200 MG/30ML; MG/30ML; MG/30ML
30 SUSPENSION ORAL 4 TIMES DAILY PRN
Status: DISCONTINUED | OUTPATIENT
Start: 2024-08-06 | End: 2024-08-08 | Stop reason: HOSPADM

## 2024-08-06 RX ORDER — HYDROCODONE BITARTRATE AND ACETAMINOPHEN 5; 325 MG/1; MG/1
1 TABLET ORAL EVERY 6 HOURS PRN
Status: DISCONTINUED | OUTPATIENT
Start: 2024-08-06 | End: 2024-08-08 | Stop reason: HOSPADM

## 2024-08-06 RX ORDER — DOXYCYCLINE 100 MG/1
100 CAPSULE ORAL EVERY 12 HOURS
Status: DISCONTINUED | OUTPATIENT
Start: 2024-08-06 | End: 2024-08-06

## 2024-08-06 RX ORDER — PANTOPRAZOLE SODIUM 40 MG/10ML
40 INJECTION, POWDER, LYOPHILIZED, FOR SOLUTION INTRAVENOUS 2 TIMES DAILY
Status: DISCONTINUED | OUTPATIENT
Start: 2024-08-06 | End: 2024-08-08 | Stop reason: HOSPADM

## 2024-08-06 RX ORDER — ACETAMINOPHEN 325 MG/1
650 TABLET ORAL EVERY 8 HOURS PRN
Status: DISCONTINUED | OUTPATIENT
Start: 2024-08-06 | End: 2024-08-08 | Stop reason: HOSPADM

## 2024-08-06 RX ADMIN — ENOXAPARIN SODIUM 40 MG: 40 INJECTION SUBCUTANEOUS at 05:08

## 2024-08-06 RX ADMIN — IOHEXOL 100 ML: 350 INJECTION, SOLUTION INTRAVENOUS at 09:08

## 2024-08-06 RX ADMIN — LEVETIRACETAM INJECTION 1000 MG: 10 INJECTION INTRAVENOUS at 10:08

## 2024-08-06 RX ADMIN — LEVETIRACETAM INJECTION 1000 MG: 10 INJECTION INTRAVENOUS at 09:08

## 2024-08-06 RX ADMIN — ACETAMINOPHEN 650 MG: 650 SUPPOSITORY RECTAL at 12:08

## 2024-08-06 RX ADMIN — PANTOPRAZOLE SODIUM 40 MG: 40 INJECTION, POWDER, FOR SOLUTION INTRAVENOUS at 03:08

## 2024-08-07 LAB
ALBUMIN SERPL BCP-MCNC: 3.4 G/DL (ref 3.5–5.2)
ALP SERPL-CCNC: 81 U/L (ref 55–135)
ALT SERPL W/O P-5'-P-CCNC: 6 U/L (ref 10–44)
ANION GAP SERPL CALC-SCNC: 10 MMOL/L (ref 8–16)
AST SERPL-CCNC: 23 U/L (ref 10–40)
BASOPHILS # BLD AUTO: 0.02 K/UL (ref 0–0.2)
BASOPHILS NFR BLD: 0.3 % (ref 0–1.9)
BILIRUB SERPL-MCNC: 0.4 MG/DL (ref 0.1–1)
BUN SERPL-MCNC: 13 MG/DL (ref 8–23)
CALCIUM SERPL-MCNC: 8.9 MG/DL (ref 8.7–10.5)
CHLORIDE SERPL-SCNC: 106 MMOL/L (ref 95–110)
CO2 SERPL-SCNC: 22 MMOL/L (ref 23–29)
CREAT SERPL-MCNC: 0.6 MG/DL (ref 0.5–1.4)
DIFFERENTIAL METHOD BLD: ABNORMAL
EOSINOPHIL # BLD AUTO: 0 K/UL (ref 0–0.5)
EOSINOPHIL NFR BLD: 0.1 % (ref 0–8)
ERYTHROCYTE [DISTWIDTH] IN BLOOD BY AUTOMATED COUNT: 14.6 % (ref 11.5–14.5)
EST. GFR  (NO RACE VARIABLE): >60 ML/MIN/1.73 M^2
GLUCOSE SERPL-MCNC: 100 MG/DL (ref 70–110)
HCT VFR BLD AUTO: 34.9 % (ref 37–48.5)
HGB BLD-MCNC: 11 G/DL (ref 12–16)
IMM GRANULOCYTES # BLD AUTO: 0.03 K/UL (ref 0–0.04)
IMM GRANULOCYTES NFR BLD AUTO: 0.4 % (ref 0–0.5)
LYMPHOCYTES # BLD AUTO: 1.5 K/UL (ref 1–4.8)
LYMPHOCYTES NFR BLD: 21.3 % (ref 18–48)
MAGNESIUM SERPL-MCNC: 2 MG/DL (ref 1.6–2.6)
MCH RBC QN AUTO: 27.3 PG (ref 27–31)
MCHC RBC AUTO-ENTMCNC: 31.5 G/DL (ref 32–36)
MCV RBC AUTO: 87 FL (ref 82–98)
MONOCYTES # BLD AUTO: 0.6 K/UL (ref 0.3–1)
MONOCYTES NFR BLD: 8.8 % (ref 4–15)
NEUTROPHILS # BLD AUTO: 4.9 K/UL (ref 1.8–7.7)
NEUTROPHILS NFR BLD: 69.1 % (ref 38–73)
NRBC BLD-RTO: 0 /100 WBC
PLATELET # BLD AUTO: 511 K/UL (ref 150–450)
PMV BLD AUTO: 9.6 FL (ref 9.2–12.9)
POTASSIUM SERPL-SCNC: 3.7 MMOL/L (ref 3.5–5.1)
PROT SERPL-MCNC: 6.5 G/DL (ref 6–8.4)
RBC # BLD AUTO: 4.03 M/UL (ref 4–5.4)
SODIUM SERPL-SCNC: 138 MMOL/L (ref 136–145)
WBC # BLD AUTO: 7.08 K/UL (ref 3.9–12.7)

## 2024-08-07 PROCEDURE — 95819 EEG AWAKE AND ASLEEP: CPT

## 2024-08-07 PROCEDURE — 96376 TX/PRO/DX INJ SAME DRUG ADON: CPT

## 2024-08-07 PROCEDURE — 25500020 PHARM REV CODE 255: Performed by: INTERNAL MEDICINE

## 2024-08-07 PROCEDURE — 25000003 PHARM REV CODE 250: Performed by: HOSPITALIST

## 2024-08-07 PROCEDURE — 63600175 PHARM REV CODE 636 W HCPCS: Performed by: HOSPITALIST

## 2024-08-07 PROCEDURE — 27000207 HC ISOLATION

## 2024-08-07 PROCEDURE — 80053 COMPREHEN METABOLIC PANEL: CPT | Performed by: HOSPITALIST

## 2024-08-07 PROCEDURE — 83735 ASSAY OF MAGNESIUM: CPT | Performed by: HOSPITALIST

## 2024-08-07 PROCEDURE — 96366 THER/PROPH/DIAG IV INF ADDON: CPT

## 2024-08-07 PROCEDURE — 85025 COMPLETE CBC W/AUTO DIFF WBC: CPT | Performed by: HOSPITALIST

## 2024-08-07 PROCEDURE — 21400001 HC TELEMETRY ROOM

## 2024-08-07 PROCEDURE — A9585 GADOBUTROL INJECTION: HCPCS | Performed by: INTERNAL MEDICINE

## 2024-08-07 RX ORDER — GADOBUTROL 604.72 MG/ML
5 INJECTION INTRAVENOUS
Status: COMPLETED | OUTPATIENT
Start: 2024-08-07 | End: 2024-08-07

## 2024-08-07 RX ORDER — LEVETIRACETAM 5 MG/ML
500 INJECTION INTRAVASCULAR EVERY 12 HOURS
Status: DISCONTINUED | OUTPATIENT
Start: 2024-08-07 | End: 2024-08-08 | Stop reason: HOSPADM

## 2024-08-07 RX ADMIN — OXYCODONE HYDROCHLORIDE AND ACETAMINOPHEN 1 TABLET: 10; 325 TABLET ORAL at 10:08

## 2024-08-07 RX ADMIN — LEVETIRACETAM INJECTION 500 MG: 5 INJECTION INTRAVENOUS at 02:08

## 2024-08-07 RX ADMIN — PANTOPRAZOLE SODIUM 40 MG: 40 INJECTION, POWDER, FOR SOLUTION INTRAVENOUS at 09:08

## 2024-08-07 RX ADMIN — GADOBUTROL 5 ML: 604.72 INJECTION INTRAVENOUS at 11:08

## 2024-08-07 RX ADMIN — ENOXAPARIN SODIUM 40 MG: 40 INJECTION SUBCUTANEOUS at 06:08

## 2024-08-07 RX ADMIN — LEVETIRACETAM INJECTION 500 MG: 5 INJECTION INTRAVENOUS at 09:08

## 2024-08-07 RX ADMIN — ESCITALOPRAM OXALATE 20 MG: 10 TABLET ORAL at 09:08

## 2024-08-07 RX ADMIN — LEVETIRACETAM INJECTION 500 MG: 5 INJECTION INTRAVENOUS at 08:08

## 2024-08-07 RX ADMIN — PREGABALIN 200 MG: 75 CAPSULE ORAL at 09:08

## 2024-08-07 RX ADMIN — OXYCODONE HYDROCHLORIDE AND ACETAMINOPHEN 1 TABLET: 10; 325 TABLET ORAL at 02:08

## 2024-08-07 RX ADMIN — PREGABALIN 200 MG: 75 CAPSULE ORAL at 08:08

## 2024-08-07 RX ADMIN — PREGABALIN 200 MG: 75 CAPSULE ORAL at 04:08

## 2024-08-07 RX ADMIN — PANTOPRAZOLE SODIUM 40 MG: 40 INJECTION, POWDER, FOR SOLUTION INTRAVENOUS at 08:08

## 2024-08-07 RX ADMIN — DOXYCYCLINE 100 MG: 100 INJECTION, POWDER, LYOPHILIZED, FOR SOLUTION INTRAVENOUS at 09:08

## 2024-08-08 ENCOUNTER — TELEPHONE (OUTPATIENT)
Dept: FAMILY MEDICINE | Facility: CLINIC | Age: 79
End: 2024-08-08
Payer: MEDICAID

## 2024-08-08 VITALS
TEMPERATURE: 98 F | BODY MASS INDEX: 21.38 KG/M2 | OXYGEN SATURATION: 97 % | HEART RATE: 78 BPM | DIASTOLIC BLOOD PRESSURE: 66 MMHG | RESPIRATION RATE: 18 BRPM | SYSTOLIC BLOOD PRESSURE: 136 MMHG | WEIGHT: 133 LBS | HEIGHT: 66 IN

## 2024-08-08 PROBLEM — F05 POST-ICTAL CONFUSION: Status: ACTIVE | Noted: 2024-08-08

## 2024-08-08 LAB
ALBUMIN SERPL BCP-MCNC: 3.2 G/DL (ref 3.5–5.2)
ALP SERPL-CCNC: 77 U/L (ref 55–135)
ALT SERPL W/O P-5'-P-CCNC: 6 U/L (ref 10–44)
ANION GAP SERPL CALC-SCNC: 7 MMOL/L (ref 8–16)
AST SERPL-CCNC: 20 U/L (ref 10–40)
BASOPHILS # BLD AUTO: 0.05 K/UL (ref 0–0.2)
BASOPHILS NFR BLD: 0.5 % (ref 0–1.9)
BILIRUB SERPL-MCNC: 0.3 MG/DL (ref 0.1–1)
BUN SERPL-MCNC: 24 MG/DL (ref 8–23)
CALCIUM SERPL-MCNC: 8.6 MG/DL (ref 8.7–10.5)
CHLORIDE SERPL-SCNC: 108 MMOL/L (ref 95–110)
CO2 SERPL-SCNC: 23 MMOL/L (ref 23–29)
CREAT SERPL-MCNC: 0.8 MG/DL (ref 0.5–1.4)
DIFFERENTIAL METHOD BLD: ABNORMAL
EOSINOPHIL # BLD AUTO: 0 K/UL (ref 0–0.5)
EOSINOPHIL NFR BLD: 0.3 % (ref 0–8)
ERYTHROCYTE [DISTWIDTH] IN BLOOD BY AUTOMATED COUNT: 14.6 % (ref 11.5–14.5)
EST. GFR  (NO RACE VARIABLE): >60 ML/MIN/1.73 M^2
GLUCOSE SERPL-MCNC: 102 MG/DL (ref 70–110)
HCT VFR BLD AUTO: 34.1 % (ref 37–48.5)
HGB BLD-MCNC: 10.6 G/DL (ref 12–16)
IMM GRANULOCYTES # BLD AUTO: 0.04 K/UL (ref 0–0.04)
IMM GRANULOCYTES NFR BLD AUTO: 0.4 % (ref 0–0.5)
LYMPHOCYTES # BLD AUTO: 2.1 K/UL (ref 1–4.8)
LYMPHOCYTES NFR BLD: 20.4 % (ref 18–48)
MAGNESIUM SERPL-MCNC: 1.9 MG/DL (ref 1.6–2.6)
MCH RBC QN AUTO: 27.2 PG (ref 27–31)
MCHC RBC AUTO-ENTMCNC: 31.1 G/DL (ref 32–36)
MCV RBC AUTO: 87 FL (ref 82–98)
MONOCYTES # BLD AUTO: 0.8 K/UL (ref 0.3–1)
MONOCYTES NFR BLD: 7.4 % (ref 4–15)
NEUTROPHILS # BLD AUTO: 7.3 K/UL (ref 1.8–7.7)
NEUTROPHILS NFR BLD: 71 % (ref 38–73)
NRBC BLD-RTO: 0 /100 WBC
PLATELET # BLD AUTO: 536 K/UL (ref 150–450)
PMV BLD AUTO: 9.9 FL (ref 9.2–12.9)
POTASSIUM SERPL-SCNC: 3.7 MMOL/L (ref 3.5–5.1)
PROT SERPL-MCNC: 5.9 G/DL (ref 6–8.4)
RBC # BLD AUTO: 3.9 M/UL (ref 4–5.4)
SODIUM SERPL-SCNC: 138 MMOL/L (ref 136–145)
WBC # BLD AUTO: 10.26 K/UL (ref 3.9–12.7)

## 2024-08-08 PROCEDURE — 83735 ASSAY OF MAGNESIUM: CPT | Performed by: HOSPITALIST

## 2024-08-08 PROCEDURE — 25000003 PHARM REV CODE 250: Performed by: HOSPITALIST

## 2024-08-08 PROCEDURE — 94761 N-INVAS EAR/PLS OXIMETRY MLT: CPT

## 2024-08-08 PROCEDURE — 36415 COLL VENOUS BLD VENIPUNCTURE: CPT | Performed by: HOSPITALIST

## 2024-08-08 PROCEDURE — 80053 COMPREHEN METABOLIC PANEL: CPT | Performed by: HOSPITALIST

## 2024-08-08 PROCEDURE — 85025 COMPLETE CBC W/AUTO DIFF WBC: CPT | Performed by: HOSPITALIST

## 2024-08-08 PROCEDURE — 97162 PT EVAL MOD COMPLEX 30 MIN: CPT

## 2024-08-08 PROCEDURE — 63600175 PHARM REV CODE 636 W HCPCS: Performed by: HOSPITALIST

## 2024-08-08 PROCEDURE — 97530 THERAPEUTIC ACTIVITIES: CPT

## 2024-08-08 RX ORDER — LEVETIRACETAM 500 MG/1
500 TABLET ORAL 2 TIMES DAILY
Qty: 180 TABLET | Refills: 1 | Status: SHIPPED | OUTPATIENT
Start: 2024-08-08

## 2024-08-08 RX ADMIN — OXYCODONE HYDROCHLORIDE AND ACETAMINOPHEN 1 TABLET: 10; 325 TABLET ORAL at 05:08

## 2024-08-08 RX ADMIN — DOXYCYCLINE 100 MG: 100 INJECTION, POWDER, LYOPHILIZED, FOR SOLUTION INTRAVENOUS at 08:08

## 2024-08-08 RX ADMIN — BENAZEPRIL HYDROCHLORIDE 20 MG: 20 TABLET ORAL at 08:08

## 2024-08-08 RX ADMIN — PREGABALIN 200 MG: 75 CAPSULE ORAL at 08:08

## 2024-08-08 RX ADMIN — POTASSIUM BICARBONATE 50 MEQ: 977.5 TABLET, EFFERVESCENT ORAL at 08:08

## 2024-08-08 RX ADMIN — LEVETIRACETAM INJECTION 500 MG: 5 INJECTION INTRAVENOUS at 08:08

## 2024-08-08 RX ADMIN — AMLODIPINE BESYLATE 5 MG: 5 TABLET ORAL at 08:08

## 2024-08-08 RX ADMIN — PREGABALIN 200 MG: 75 CAPSULE ORAL at 02:08

## 2024-08-08 RX ADMIN — PANTOPRAZOLE SODIUM 40 MG: 40 INJECTION, POWDER, FOR SOLUTION INTRAVENOUS at 12:08

## 2024-08-10 LAB
BACTERIA BLD CULT: NORMAL

## 2024-09-07 ENCOUNTER — HOSPITAL ENCOUNTER (OUTPATIENT)
Facility: HOSPITAL | Age: 79
Discharge: HOME | End: 2024-09-09
Attending: STUDENT IN AN ORGANIZED HEALTH CARE EDUCATION/TRAINING PROGRAM | Admitting: INTERNAL MEDICINE
Payer: MEDICARE

## 2024-09-07 DIAGNOSIS — R10.13 EPIGASTRIC ABDOMINAL PAIN: ICD-10-CM

## 2024-09-07 DIAGNOSIS — R11.2 NAUSEA AND VOMITING, UNSPECIFIED VOMITING TYPE: ICD-10-CM

## 2024-09-07 DIAGNOSIS — K81.9 CHOLECYSTITIS: Primary | ICD-10-CM

## 2024-09-07 DIAGNOSIS — R07.9 CHEST PAIN: ICD-10-CM

## 2024-09-07 PROCEDURE — 82565 ASSAY OF CREATININE: CPT

## 2024-09-07 PROCEDURE — 63600175 PHARM REV CODE 636 W HCPCS: Performed by: STUDENT IN AN ORGANIZED HEALTH CARE EDUCATION/TRAINING PROGRAM

## 2024-09-07 PROCEDURE — 99285 EMERGENCY DEPT VISIT HI MDM: CPT | Mod: 25

## 2024-09-07 PROCEDURE — 96375 TX/PRO/DX INJ NEW DRUG ADDON: CPT

## 2024-09-07 PROCEDURE — 80053 COMPREHEN METABOLIC PANEL: CPT | Performed by: STUDENT IN AN ORGANIZED HEALTH CARE EDUCATION/TRAINING PROGRAM

## 2024-09-07 PROCEDURE — 83690 ASSAY OF LIPASE: CPT | Performed by: STUDENT IN AN ORGANIZED HEALTH CARE EDUCATION/TRAINING PROGRAM

## 2024-09-07 PROCEDURE — 85025 COMPLETE CBC W/AUTO DIFF WBC: CPT | Performed by: STUDENT IN AN ORGANIZED HEALTH CARE EDUCATION/TRAINING PROGRAM

## 2024-09-07 RX ORDER — MORPHINE SULFATE 2 MG/ML
2 INJECTION, SOLUTION INTRAMUSCULAR; INTRAVENOUS
Status: COMPLETED | OUTPATIENT
Start: 2024-09-08 | End: 2024-09-08

## 2024-09-07 RX ORDER — FAMOTIDINE 10 MG/ML
20 INJECTION INTRAVENOUS
Status: COMPLETED | OUTPATIENT
Start: 2024-09-08 | End: 2024-09-08

## 2024-09-07 RX ORDER — PROCHLORPERAZINE EDISYLATE 5 MG/ML
5 INJECTION INTRAMUSCULAR; INTRAVENOUS
Status: COMPLETED | OUTPATIENT
Start: 2024-09-08 | End: 2024-09-07

## 2024-09-07 RX ADMIN — PROCHLORPERAZINE EDISYLATE 5 MG: 5 INJECTION INTRAMUSCULAR; INTRAVENOUS at 11:09

## 2024-09-08 ENCOUNTER — ANESTHESIA EVENT (OUTPATIENT)
Dept: SURGERY | Facility: HOSPITAL | Age: 79
End: 2024-09-08
Payer: MEDICARE

## 2024-09-08 ENCOUNTER — ANESTHESIA (OUTPATIENT)
Dept: SURGERY | Facility: HOSPITAL | Age: 79
End: 2024-09-08
Payer: MEDICARE

## 2024-09-08 LAB
ALBUMIN SERPL BCP-MCNC: 3.6 G/DL (ref 3.5–5.2)
ALP SERPL-CCNC: 85 U/L (ref 55–135)
ALT SERPL W/O P-5'-P-CCNC: 13 U/L (ref 10–44)
ANION GAP SERPL CALC-SCNC: 10 MMOL/L (ref 8–16)
AST SERPL-CCNC: 18 U/L (ref 10–40)
BASOPHILS # BLD AUTO: 0.03 K/UL (ref 0–0.2)
BASOPHILS NFR BLD: 0.6 % (ref 0–1.9)
BILIRUB SERPL-MCNC: 0.6 MG/DL (ref 0.1–1)
BUN SERPL-MCNC: 14 MG/DL (ref 8–23)
CALCIUM SERPL-MCNC: 9 MG/DL (ref 8.7–10.5)
CHLORIDE SERPL-SCNC: 105 MMOL/L (ref 95–110)
CO2 SERPL-SCNC: 22 MMOL/L (ref 23–29)
CREAT SERPL-MCNC: 0.7 MG/DL (ref 0.5–1.4)
CREAT SERPL-MCNC: 0.7 MG/DL (ref 0.5–1.4)
DIFFERENTIAL METHOD BLD: ABNORMAL
EOSINOPHIL # BLD AUTO: 0 K/UL (ref 0–0.5)
EOSINOPHIL NFR BLD: 0.6 % (ref 0–8)
ERYTHROCYTE [DISTWIDTH] IN BLOOD BY AUTOMATED COUNT: 15.1 % (ref 11.5–14.5)
EST. GFR  (NO RACE VARIABLE): >60 ML/MIN/1.73 M^2
GLUCOSE SERPL-MCNC: 124 MG/DL (ref 70–110)
HCT VFR BLD AUTO: 33.2 % (ref 37–48.5)
HGB BLD-MCNC: 10.4 G/DL (ref 12–16)
IMM GRANULOCYTES # BLD AUTO: 0.01 K/UL (ref 0–0.04)
IMM GRANULOCYTES NFR BLD AUTO: 0.2 % (ref 0–0.5)
LDH SERPL L TO P-CCNC: 1.04 MMOL/L (ref 0.5–2.2)
LIPASE SERPL-CCNC: 14 U/L (ref 4–60)
LYMPHOCYTES # BLD AUTO: 1.1 K/UL (ref 1–4.8)
LYMPHOCYTES NFR BLD: 23.2 % (ref 18–48)
MCH RBC QN AUTO: 27.3 PG (ref 27–31)
MCHC RBC AUTO-ENTMCNC: 31.3 G/DL (ref 32–36)
MCV RBC AUTO: 87 FL (ref 82–98)
MONOCYTES # BLD AUTO: 0.3 K/UL (ref 0.3–1)
MONOCYTES NFR BLD: 7.1 % (ref 4–15)
NEUTROPHILS # BLD AUTO: 3.3 K/UL (ref 1.8–7.7)
NEUTROPHILS NFR BLD: 68.3 % (ref 38–73)
NRBC BLD-RTO: 0 /100 WBC
PLATELET # BLD AUTO: 383 K/UL (ref 150–450)
PMV BLD AUTO: 10 FL (ref 9.2–12.9)
POTASSIUM SERPL-SCNC: 3.5 MMOL/L (ref 3.5–5.1)
PROT SERPL-MCNC: 6.3 G/DL (ref 6–8.4)
RBC # BLD AUTO: 3.81 M/UL (ref 4–5.4)
SAMPLE: NORMAL
SAMPLE: NORMAL
SODIUM SERPL-SCNC: 137 MMOL/L (ref 136–145)
WBC # BLD AUTO: 4.78 K/UL (ref 3.9–12.7)

## 2024-09-08 PROCEDURE — 88304 TISSUE EXAM BY PATHOLOGIST: CPT | Mod: TC | Performed by: PATHOLOGY

## 2024-09-08 PROCEDURE — 25000003 PHARM REV CODE 250: Performed by: ANESTHESIOLOGY

## 2024-09-08 PROCEDURE — D9220A PRA ANESTHESIA: Mod: ANES,,, | Performed by: ANESTHESIOLOGY

## 2024-09-08 PROCEDURE — 25000003 PHARM REV CODE 250: Performed by: SURGERY

## 2024-09-08 PROCEDURE — 63600175 PHARM REV CODE 636 W HCPCS: Performed by: ANESTHESIOLOGY

## 2024-09-08 PROCEDURE — 63600175 PHARM REV CODE 636 W HCPCS: Performed by: SURGERY

## 2024-09-08 PROCEDURE — D9220A PRA ANESTHESIA: Mod: CRNA,,, | Performed by: NURSE ANESTHETIST, CERTIFIED REGISTERED

## 2024-09-08 PROCEDURE — 25000003 PHARM REV CODE 250: Performed by: NURSE ANESTHETIST, CERTIFIED REGISTERED

## 2024-09-08 PROCEDURE — G0378 HOSPITAL OBSERVATION PER HR: HCPCS

## 2024-09-08 PROCEDURE — 36000709 HC OR TIME LEV III EA ADD 15 MIN: Performed by: SURGERY

## 2024-09-08 PROCEDURE — 99900031 HC PATIENT EDUCATION (STAT)

## 2024-09-08 PROCEDURE — 96366 THER/PROPH/DIAG IV INF ADDON: CPT | Mod: 59

## 2024-09-08 PROCEDURE — 71000039 HC RECOVERY, EACH ADD'L HOUR: Performed by: SURGERY

## 2024-09-08 PROCEDURE — 63600175 PHARM REV CODE 636 W HCPCS: Performed by: STUDENT IN AN ORGANIZED HEALTH CARE EDUCATION/TRAINING PROGRAM

## 2024-09-08 PROCEDURE — 63600175 PHARM REV CODE 636 W HCPCS: Performed by: INTERNAL MEDICINE

## 2024-09-08 PROCEDURE — 27201423 OPTIME MED/SURG SUP & DEVICES STERILE SUPPLY: Performed by: SURGERY

## 2024-09-08 PROCEDURE — 47562 LAPAROSCOPIC CHOLECYSTECTOMY: CPT | Mod: ,,, | Performed by: SURGERY

## 2024-09-08 PROCEDURE — 94761 N-INVAS EAR/PLS OXIMETRY MLT: CPT

## 2024-09-08 PROCEDURE — 25000003 PHARM REV CODE 250

## 2024-09-08 PROCEDURE — 25500020 PHARM REV CODE 255: Performed by: STUDENT IN AN ORGANIZED HEALTH CARE EDUCATION/TRAINING PROGRAM

## 2024-09-08 PROCEDURE — 96365 THER/PROPH/DIAG IV INF INIT: CPT | Mod: 59

## 2024-09-08 PROCEDURE — 96376 TX/PRO/DX INJ SAME DRUG ADON: CPT | Mod: 59

## 2024-09-08 PROCEDURE — 99204 OFFICE O/P NEW MOD 45 MIN: CPT | Mod: 57,,, | Performed by: SURGERY

## 2024-09-08 PROCEDURE — 96361 HYDRATE IV INFUSION ADD-ON: CPT

## 2024-09-08 PROCEDURE — 25000003 PHARM REV CODE 250: Performed by: STUDENT IN AN ORGANIZED HEALTH CARE EDUCATION/TRAINING PROGRAM

## 2024-09-08 PROCEDURE — 36000708 HC OR TIME LEV III 1ST 15 MIN: Performed by: SURGERY

## 2024-09-08 PROCEDURE — 63600175 PHARM REV CODE 636 W HCPCS

## 2024-09-08 PROCEDURE — 63600175 PHARM REV CODE 636 W HCPCS: Performed by: NURSE ANESTHETIST, CERTIFIED REGISTERED

## 2024-09-08 PROCEDURE — 37000008 HC ANESTHESIA 1ST 15 MINUTES: Performed by: SURGERY

## 2024-09-08 PROCEDURE — 37000009 HC ANESTHESIA EA ADD 15 MINS: Performed by: SURGERY

## 2024-09-08 PROCEDURE — 96375 TX/PRO/DX INJ NEW DRUG ADDON: CPT

## 2024-09-08 PROCEDURE — 25000003 PHARM REV CODE 250: Performed by: INTERNAL MEDICINE

## 2024-09-08 PROCEDURE — 71000033 HC RECOVERY, INTIAL HOUR: Performed by: SURGERY

## 2024-09-08 RX ORDER — FENTANYL CITRATE 50 UG/ML
25 INJECTION, SOLUTION INTRAMUSCULAR; INTRAVENOUS EVERY 5 MIN PRN
Status: COMPLETED | OUTPATIENT
Start: 2024-09-08 | End: 2024-09-08

## 2024-09-08 RX ORDER — LIDOCAINE HYDROCHLORIDE 10 MG/ML
INJECTION, SOLUTION EPIDURAL; INFILTRATION; INTRACAUDAL; PERINEURAL
Status: DISCONTINUED | OUTPATIENT
Start: 2024-09-08 | End: 2024-09-08

## 2024-09-08 RX ORDER — FAMOTIDINE 10 MG/ML
INJECTION INTRAVENOUS
Status: DISCONTINUED | OUTPATIENT
Start: 2024-09-08 | End: 2024-09-08

## 2024-09-08 RX ORDER — PROPOFOL 10 MG/ML
VIAL (ML) INTRAVENOUS
Status: DISCONTINUED | OUTPATIENT
Start: 2024-09-08 | End: 2024-09-08

## 2024-09-08 RX ORDER — HYDROMORPHONE HYDROCHLORIDE 1 MG/ML
0.2 INJECTION, SOLUTION INTRAMUSCULAR; INTRAVENOUS; SUBCUTANEOUS EVERY 5 MIN PRN
Status: COMPLETED | OUTPATIENT
Start: 2024-09-08 | End: 2024-09-08

## 2024-09-08 RX ORDER — AMLODIPINE BESYLATE 5 MG/1
5 TABLET ORAL DAILY
Status: DISCONTINUED | OUTPATIENT
Start: 2024-09-08 | End: 2024-09-09 | Stop reason: HOSPADM

## 2024-09-08 RX ORDER — SODIUM CHLORIDE 0.9 % (FLUSH) 0.9 %
10 SYRINGE (ML) INJECTION
Status: DISCONTINUED | OUTPATIENT
Start: 2024-09-08 | End: 2024-09-08 | Stop reason: HOSPADM

## 2024-09-08 RX ORDER — HYDRALAZINE HYDROCHLORIDE 20 MG/ML
5 INJECTION INTRAMUSCULAR; INTRAVENOUS ONCE
Status: COMPLETED | OUTPATIENT
Start: 2024-09-08 | End: 2024-09-08

## 2024-09-08 RX ORDER — GLUCAGON 1 MG
1 KIT INJECTION
Status: DISCONTINUED | OUTPATIENT
Start: 2024-09-08 | End: 2024-09-08 | Stop reason: HOSPADM

## 2024-09-08 RX ORDER — SODIUM CHLORIDE, SODIUM LACTATE, POTASSIUM CHLORIDE, CALCIUM CHLORIDE 600; 310; 30; 20 MG/100ML; MG/100ML; MG/100ML; MG/100ML
INJECTION, SOLUTION INTRAVENOUS CONTINUOUS PRN
Status: DISCONTINUED | OUTPATIENT
Start: 2024-09-08 | End: 2024-09-08

## 2024-09-08 RX ORDER — AMOXICILLIN 250 MG
1 CAPSULE ORAL DAILY PRN
Status: DISCONTINUED | OUTPATIENT
Start: 2024-09-08 | End: 2024-09-09 | Stop reason: HOSPADM

## 2024-09-08 RX ORDER — IBUPROFEN 200 MG
16 TABLET ORAL
Status: DISCONTINUED | OUTPATIENT
Start: 2024-09-08 | End: 2024-09-09 | Stop reason: HOSPADM

## 2024-09-08 RX ORDER — ALUMINUM HYDROXIDE, MAGNESIUM HYDROXIDE, AND SIMETHICONE 1200; 120; 1200 MG/30ML; MG/30ML; MG/30ML
30 SUSPENSION ORAL 4 TIMES DAILY PRN
Status: DISCONTINUED | OUTPATIENT
Start: 2024-09-08 | End: 2024-09-09 | Stop reason: HOSPADM

## 2024-09-08 RX ORDER — SODIUM CHLORIDE 0.9 % (FLUSH) 0.9 %
2 SYRINGE (ML) INJECTION EVERY 12 HOURS PRN
Status: DISCONTINUED | OUTPATIENT
Start: 2024-09-08 | End: 2024-09-09 | Stop reason: HOSPADM

## 2024-09-08 RX ORDER — SODIUM,POTASSIUM PHOSPHATES 280-250MG
2 POWDER IN PACKET (EA) ORAL
Status: DISCONTINUED | OUTPATIENT
Start: 2024-09-08 | End: 2024-09-09 | Stop reason: HOSPADM

## 2024-09-08 RX ORDER — FENTANYL CITRATE 50 UG/ML
INJECTION, SOLUTION INTRAMUSCULAR; INTRAVENOUS
Status: COMPLETED
Start: 2024-09-08 | End: 2024-09-08

## 2024-09-08 RX ORDER — ONDANSETRON HYDROCHLORIDE 2 MG/ML
INJECTION, SOLUTION INTRAVENOUS
Status: DISCONTINUED | OUTPATIENT
Start: 2024-09-08 | End: 2024-09-08

## 2024-09-08 RX ORDER — POLYETHYLENE GLYCOL 3350 17 G/17G
17 POWDER, FOR SOLUTION ORAL DAILY PRN
Status: DISCONTINUED | OUTPATIENT
Start: 2024-09-08 | End: 2024-09-09 | Stop reason: HOSPADM

## 2024-09-08 RX ORDER — MORPHINE SULFATE 2 MG/ML
2 INJECTION, SOLUTION INTRAMUSCULAR; INTRAVENOUS EVERY 4 HOURS PRN
Status: DISCONTINUED | OUTPATIENT
Start: 2024-09-08 | End: 2024-09-09 | Stop reason: HOSPADM

## 2024-09-08 RX ORDER — OXYCODONE HYDROCHLORIDE 5 MG/1
5 TABLET ORAL EVERY 6 HOURS PRN
Status: DISCONTINUED | OUTPATIENT
Start: 2024-09-08 | End: 2024-09-09 | Stop reason: HOSPADM

## 2024-09-08 RX ORDER — DEXAMETHASONE SODIUM PHOSPHATE 4 MG/ML
INJECTION, SOLUTION INTRA-ARTICULAR; INTRALESIONAL; INTRAMUSCULAR; INTRAVENOUS; SOFT TISSUE
Status: DISCONTINUED | OUTPATIENT
Start: 2024-09-08 | End: 2024-09-08

## 2024-09-08 RX ORDER — LEVETIRACETAM 500 MG/1
500 TABLET ORAL 2 TIMES DAILY
Status: DISCONTINUED | OUTPATIENT
Start: 2024-09-08 | End: 2024-09-09 | Stop reason: HOSPADM

## 2024-09-08 RX ORDER — ONDANSETRON HYDROCHLORIDE 2 MG/ML
4 INJECTION, SOLUTION INTRAVENOUS EVERY 8 HOURS PRN
Status: DISCONTINUED | OUTPATIENT
Start: 2024-09-08 | End: 2024-09-09 | Stop reason: HOSPADM

## 2024-09-08 RX ORDER — OXYCODONE HYDROCHLORIDE 5 MG/1
5 TABLET ORAL
Status: DISCONTINUED | OUTPATIENT
Start: 2024-09-08 | End: 2024-09-08 | Stop reason: HOSPADM

## 2024-09-08 RX ORDER — LANOLIN ALCOHOL/MO/W.PET/CERES
800 CREAM (GRAM) TOPICAL
Status: DISCONTINUED | OUTPATIENT
Start: 2024-09-08 | End: 2024-09-09 | Stop reason: HOSPADM

## 2024-09-08 RX ORDER — GLUCAGON 1 MG
1 KIT INJECTION
Status: DISCONTINUED | OUTPATIENT
Start: 2024-09-08 | End: 2024-09-09 | Stop reason: HOSPADM

## 2024-09-08 RX ORDER — ONDANSETRON 4 MG/1
8 TABLET, ORALLY DISINTEGRATING ORAL EVERY 8 HOURS PRN
Status: DISCONTINUED | OUTPATIENT
Start: 2024-09-08 | End: 2024-09-09 | Stop reason: HOSPADM

## 2024-09-08 RX ORDER — ACETAMINOPHEN 10 MG/ML
INJECTION, SOLUTION INTRAVENOUS
Status: DISCONTINUED | OUTPATIENT
Start: 2024-09-08 | End: 2024-09-08

## 2024-09-08 RX ORDER — PHENYLEPHRINE HYDROCHLORIDE 10 MG/ML
INJECTION INTRAVENOUS
Status: DISCONTINUED | OUTPATIENT
Start: 2024-09-08 | End: 2024-09-08

## 2024-09-08 RX ORDER — ENOXAPARIN SODIUM 100 MG/ML
40 INJECTION SUBCUTANEOUS EVERY 24 HOURS
Status: DISCONTINUED | OUTPATIENT
Start: 2024-09-09 | End: 2024-09-09 | Stop reason: HOSPADM

## 2024-09-08 RX ORDER — ESCITALOPRAM OXALATE 10 MG/1
20 TABLET ORAL NIGHTLY
Status: DISCONTINUED | OUTPATIENT
Start: 2024-09-08 | End: 2024-09-09 | Stop reason: HOSPADM

## 2024-09-08 RX ORDER — PANTOPRAZOLE SODIUM 40 MG/1
40 TABLET, DELAYED RELEASE ORAL DAILY
Status: DISCONTINUED | OUTPATIENT
Start: 2024-09-08 | End: 2024-09-09 | Stop reason: HOSPADM

## 2024-09-08 RX ORDER — MORPHINE SULFATE 2 MG/ML
2 INJECTION, SOLUTION INTRAMUSCULAR; INTRAVENOUS
Status: COMPLETED | OUTPATIENT
Start: 2024-09-08 | End: 2024-09-08

## 2024-09-08 RX ORDER — HYDRALAZINE HYDROCHLORIDE 20 MG/ML
INJECTION INTRAMUSCULAR; INTRAVENOUS
Status: COMPLETED
Start: 2024-09-08 | End: 2024-09-08

## 2024-09-08 RX ORDER — TALC
6 POWDER (GRAM) TOPICAL NIGHTLY PRN
Status: DISCONTINUED | OUTPATIENT
Start: 2024-09-08 | End: 2024-09-09 | Stop reason: HOSPADM

## 2024-09-08 RX ORDER — NALOXONE HCL 0.4 MG/ML
0.02 VIAL (ML) INJECTION
Status: DISCONTINUED | OUTPATIENT
Start: 2024-09-08 | End: 2024-09-09 | Stop reason: HOSPADM

## 2024-09-08 RX ORDER — BUPIVACAINE HYDROCHLORIDE AND EPINEPHRINE 2.5; 5 MG/ML; UG/ML
INJECTION, SOLUTION EPIDURAL; INFILTRATION; INTRACAUDAL; PERINEURAL
Status: DISCONTINUED | OUTPATIENT
Start: 2024-09-08 | End: 2024-09-08 | Stop reason: HOSPADM

## 2024-09-08 RX ORDER — SUCCINYLCHOLINE CHLORIDE 20 MG/ML
INJECTION INTRAMUSCULAR; INTRAVENOUS
Status: DISCONTINUED | OUTPATIENT
Start: 2024-09-08 | End: 2024-09-08

## 2024-09-08 RX ORDER — FENTANYL CITRATE 50 UG/ML
INJECTION, SOLUTION INTRAMUSCULAR; INTRAVENOUS
Status: DISCONTINUED | OUTPATIENT
Start: 2024-09-08 | End: 2024-09-08

## 2024-09-08 RX ORDER — ROCURONIUM BROMIDE 10 MG/ML
INJECTION, SOLUTION INTRAVENOUS
Status: DISCONTINUED | OUTPATIENT
Start: 2024-09-08 | End: 2024-09-08

## 2024-09-08 RX ORDER — ACETAMINOPHEN 325 MG/1
650 TABLET ORAL EVERY 4 HOURS PRN
Status: DISCONTINUED | OUTPATIENT
Start: 2024-09-08 | End: 2024-09-09 | Stop reason: HOSPADM

## 2024-09-08 RX ORDER — IBUPROFEN 200 MG
24 TABLET ORAL
Status: DISCONTINUED | OUTPATIENT
Start: 2024-09-08 | End: 2024-09-09 | Stop reason: HOSPADM

## 2024-09-08 RX ORDER — ACETAMINOPHEN 325 MG/1
650 TABLET ORAL EVERY 8 HOURS PRN
Status: DISCONTINUED | OUTPATIENT
Start: 2024-09-08 | End: 2024-09-09 | Stop reason: HOSPADM

## 2024-09-08 RX ADMIN — SODIUM CHLORIDE, POTASSIUM CHLORIDE, SODIUM LACTATE AND CALCIUM CHLORIDE 500 ML: 600; 310; 30; 20 INJECTION, SOLUTION INTRAVENOUS at 12:09

## 2024-09-08 RX ADMIN — FENTANYL CITRATE 50 MCG: 50 INJECTION, SOLUTION INTRAMUSCULAR; INTRAVENOUS at 10:09

## 2024-09-08 RX ADMIN — MORPHINE SULFATE 2 MG: 2 INJECTION, SOLUTION INTRAMUSCULAR; INTRAVENOUS at 12:09

## 2024-09-08 RX ADMIN — ONDANSETRON 4 MG: 2 INJECTION INTRAMUSCULAR; INTRAVENOUS at 10:09

## 2024-09-08 RX ADMIN — Medication 120 MG: at 10:09

## 2024-09-08 RX ADMIN — HYDROMORPHONE HYDROCHLORIDE 0.2 MG: 1 INJECTION, SOLUTION INTRAMUSCULAR; INTRAVENOUS; SUBCUTANEOUS at 12:09

## 2024-09-08 RX ADMIN — FENTANYL CITRATE 25 MCG: 50 INJECTION, SOLUTION INTRAMUSCULAR; INTRAVENOUS at 11:09

## 2024-09-08 RX ADMIN — IOHEXOL 100 ML: 350 INJECTION, SOLUTION INTRAVENOUS at 01:09

## 2024-09-08 RX ADMIN — PIPERACILLIN SODIUM AND TAZOBACTAM SODIUM 4.5 G: 4; .5 INJECTION, POWDER, LYOPHILIZED, FOR SOLUTION INTRAVENOUS at 09:09

## 2024-09-08 RX ADMIN — SUGAMMADEX 200 MG: 100 INJECTION, SOLUTION INTRAVENOUS at 11:09

## 2024-09-08 RX ADMIN — PREGABALIN 200 MG: 75 CAPSULE ORAL at 09:09

## 2024-09-08 RX ADMIN — ONDANSETRON 4 MG: 2 INJECTION INTRAMUSCULAR; INTRAVENOUS at 11:09

## 2024-09-08 RX ADMIN — ESCITALOPRAM OXALATE 20 MG: 10 TABLET ORAL at 09:09

## 2024-09-08 RX ADMIN — PROPOFOL 80 MG: 10 INJECTION, EMULSION INTRAVENOUS at 10:09

## 2024-09-08 RX ADMIN — OXYCODONE HYDROCHLORIDE 5 MG: 5 TABLET ORAL at 08:09

## 2024-09-08 RX ADMIN — OXYCODONE HYDROCHLORIDE 5 MG: 5 TABLET ORAL at 11:09

## 2024-09-08 RX ADMIN — AMLODIPINE BESYLATE 5 MG: 5 TABLET ORAL at 05:09

## 2024-09-08 RX ADMIN — PIPERACILLIN AND TAZOBACTAM 4.5 G: 4; .5 INJECTION, POWDER, LYOPHILIZED, FOR SOLUTION INTRAVENOUS; PARENTERAL at 04:09

## 2024-09-08 RX ADMIN — LIDOCAINE HYDROCHLORIDE 50 MG: 10 INJECTION, SOLUTION EPIDURAL; INFILTRATION; INTRACAUDAL; PERINEURAL at 10:09

## 2024-09-08 RX ADMIN — PREGABALIN 200 MG: 75 CAPSULE ORAL at 03:09

## 2024-09-08 RX ADMIN — OXYCODONE HYDROCHLORIDE 5 MG: 5 TABLET ORAL at 06:09

## 2024-09-08 RX ADMIN — PHENYLEPHRINE HYDROCHLORIDE 100 MCG: 10 INJECTION INTRAVENOUS at 10:09

## 2024-09-08 RX ADMIN — PIPERACILLIN SODIUM AND TAZOBACTAM SODIUM 4.5 G: 4; .5 INJECTION, POWDER, LYOPHILIZED, FOR SOLUTION INTRAVENOUS at 10:09

## 2024-09-08 RX ADMIN — SODIUM CHLORIDE, SODIUM LACTATE, POTASSIUM CHLORIDE, AND CALCIUM CHLORIDE: .6; .31; .03; .02 INJECTION, SOLUTION INTRAVENOUS at 10:09

## 2024-09-08 RX ADMIN — FAMOTIDINE 20 MG: 10 INJECTION, SOLUTION INTRAVENOUS at 10:09

## 2024-09-08 RX ADMIN — DEXAMETHASONE SODIUM PHOSPHATE 4 MG: 4 INJECTION, SOLUTION INTRAMUSCULAR; INTRAVENOUS at 10:09

## 2024-09-08 RX ADMIN — ROCURONIUM BROMIDE 5 MG: 10 INJECTION, SOLUTION INTRAVENOUS at 10:09

## 2024-09-08 RX ADMIN — LEVETIRACETAM 500 MG: 500 TABLET, FILM COATED ORAL at 09:09

## 2024-09-08 RX ADMIN — MORPHINE SULFATE 2 MG: 2 INJECTION, SOLUTION INTRAMUSCULAR; INTRAVENOUS at 05:09

## 2024-09-08 RX ADMIN — PREGABALIN 200 MG: 75 CAPSULE ORAL at 06:09

## 2024-09-08 RX ADMIN — HYDRALAZINE HYDROCHLORIDE 5 MG: 20 INJECTION INTRAMUSCULAR; INTRAVENOUS at 12:09

## 2024-09-08 RX ADMIN — ACETAMINOPHEN 1000 MG: 10 INJECTION, SOLUTION INTRAVENOUS at 10:09

## 2024-09-08 RX ADMIN — FAMOTIDINE 20 MG: 10 INJECTION, SOLUTION INTRAVENOUS at 12:09

## 2024-09-08 NOTE — PLAN OF CARE
09/08/24 0844   MARC Message   Medicare Outpatient and Observation Notification regarding financial responsibility Given to patient/caregiver;Explained to patient/caregiver;Signed/date by patient/caregiver   Date MARC was signed 09/08/24   Time MARC was signed 0830

## 2024-09-08 NOTE — CONSULTS
[facility]  General Surgery  Consult Note    Patient Name: Froilan Ray  MRN: [id number]  Code Status: Full Code  Admission Date: 9/7/2024  Hospital Length of Stay: 0 days  Attending Physician: Jodi Lara MD  Primary Care Provider: Alphonse Boothe III, MD    Patient information was obtained from patient and ER records.     Inpatient consult to General surgery  Consult performed by: Cipriano Ambrosio MD  Consult ordered by: Niranjan Mac MD        Subjective:     Principal Problem: <principal problem not specified>    History of Present Illness: This is a pleasant 78-year-old female who presented to the ER overnight with the abdominal pain and tenderness in the upper abdomen.  Notes the pain came on rather suddenly.  Reports mild nausea no emesis.  States pain might be a little bit better today.  On presentation to the ER she had a CT scan and ultrasound which did demonstrate findings consistent with cholecystitis.  Also incidental findings of questionable thickening of the sigmoid colon.  Surgery was consulted for evaluation.  Patient with no significant past medical history.  Has had previous umbilical hernia repair    No current facility-administered medications on file prior to encounter.     Current Outpatient Medications on File Prior to Encounter   Medication Sig    acetaminophen (TYLENOL) 325 MG tablet Take 2 tablets (650 mg total) by mouth every 8 (eight) hours as needed for Pain (pain scale 1-4).    amlodipine-benazepril 5-20 mg (LOTREL) 5-20 mg per capsule Take 1 capsule by mouth once daily.    calcium carbonate (TUMS) 200 mg calcium (500 mg) chewable tablet Chew and swallow 2 tablets (1,000 mg total) by mouth once daily.    cyclobenzaprine (FLEXERIL) 10 MG tablet Take 1 tablet (10 mg total) by mouth 3 (three) times daily as needed for Muscle spasms.    docusate sodium (COLACE) 100 MG capsule Take 1 capsule (100 mg total) by mouth 2 (two) times daily.    ergocalciferol  (ERGOCALCIFEROL) 50,000 unit Cap Take 1 capsule (50,000 Units total) by mouth every 7 days.    EScitalopram oxalate (LEXAPRO) 20 MG tablet Take 1 tablet (20 mg total) by mouth every evening.    levETIRAcetam (KEPPRA) 500 MG Tab Take 1 tablet (500 mg total) by mouth 2 (two) times daily.    nystatin (MYCOSTATIN) powder Apply to affected area 3 times daily (Patient taking differently: Apply 1 g topically 3 (three) times daily. Apply to affected area 3 times daily)    omeprazole (PRILOSEC) 40 MG capsule TAKE 1 CAPSULE(40 MG) BY MOUTH EVERY MORNING    OPW TEST CLAIM - DO NOT FILL OPW test claim. Do not fill.    oxyCODONE-acetaminophen (PERCOCET)  mg per tablet Take 1 tablet by mouth every 4 (four) hours as needed for Pain.    polyethylene glycol (GLYCOLAX) 17 gram/dose powder Use to cap to measure 17 grams, mix with liquid, and take by mouth once daily.    pregabalin (LYRICA) 200 MG Cap Take 1 capsule (200 mg total) by mouth 3 (three) times daily.    sodium chloride 0.9% SolP 50 mL with DAPTOmycin 500 mg SolR 676 mg Inject 676 mg into the vein Daily. End date 3/28/24    [DISCONTINUED] pantoprazole (PROTONIX) 40 MG tablet Take 1 tablet (40 mg total) by mouth 2 (two) times daily.       Review of patient's allergies indicates:  No Known Allergies    Past Medical History:   Diagnosis Date    Anemia     Arthritis     Bleeding ulcer 07/2016    Chronic pain     DDD (degenerative disc disease), cervical     DDD (degenerative disc disease), lumbar     Depression     Disc degeneration, lumbosacral     Diverticulitis     Encounter for blood transfusion     Hypertension     IBS (irritable bowel syndrome)     Neuropathy of both feet     Seizures     none  since 2017    Umbilical hernia 2020     Past Surgical History:   Procedure Laterality Date    ARTHROPLASTY, HIP, GIRDLESTONE, POSTERIOR APPROACH Left 1/19/2024    Procedure: ARTHROPLASTY, HIP, GIRDLESTONE, POSTERIOR APPROACH;  Surgeon: Bulmaro Tellez MD;  Location: [facility] OR  2ND FLR;  Service: Orthopedics;  Laterality: Left;    ARTHROPLASTY, HIP, GIRDLESTONE, POSTERIOR APPROACH Left 1/25/2024    Procedure: Irrigation and debridement left hip,;  Surgeon: Juanito Painting MD;  Location: [facility] OR 2ND FLR;  Service: Orthopedics;  Laterality: Left;    ARTHROPLASTY, HIP, GIRDLESTONE, POSTERIOR APPROACH Left 2/15/2024    Procedure: Revision hip antibiotic spacer head exchange, left, lateral, peg board, depuy osteomed in room, vanc, ancef, txa,;  Surgeon: Bulmaro Tellez MD;  Location: [facility] OR 2ND FLR;  Service: Orthopedics;  Laterality: Left;    BACK SURGERY      BREAST BIOPSY      BREAST SURGERY Right     lumpectomy    CAUDAL EPIDURAL STEROID INJECTION N/A 01/28/2022    Procedure: Injection-steroid-epidural-caudal;  Surgeon: Luzmaria Marcelino MD;  Location: [facility] OR;  Service: Pain Management;  Laterality: N/A;    COLONOSCOPY N/A 01/14/2022    Procedure: COLONOSCOPY;  Surgeon: Abby Landers MD;  Location: [facility];  Service: Endoscopy;  Laterality: N/A;    ENDOSCOPIC ULTRASOUND OF UPPER GASTROINTESTINAL TRACT N/A 07/21/2020    Procedure: ULTRASOUND, UPPER GI TRACT, ENDOSCOPIC;  Surgeon: Marcio Nguyễn III, MD;  Location: [facility];  Service: Endoscopy;  Laterality: N/A;    ESOPHAGOGASTRODUODENOSCOPY N/A 01/14/2022    Procedure: EGD (ESOPHAGOGASTRODUODENOSCOPY);  Surgeon: Abby Landers MD;  Location: [facility] ENDO;  Service: Endoscopy;  Laterality: N/A;    ESOPHAGOGASTRODUODENOSCOPY N/A 06/10/2022    Procedure: EGD (ESOPHAGOGASTRODUODENOSCOPY);  Surgeon: Abby Landers MD;  Location: [facility] ENDO;  Service: Endoscopy;  Laterality: N/A;    EYE SURGERY      cataract    FLAP GRAFT, LOCAL Left 2/15/2024    Procedure: FLAP GRAFT, LOCAL;  Surgeon: Bulmaro Tellez MD;  Location: [facility] OR 2ND FLR;  Service: Orthopedics;  Laterality: Left;    HYSTERECTOMY      INTRAMEDULLARY RODDING OF TROCHANTER OF FEMUR Left 12/11/2018    Procedure: INSERTION, INTRAMEDULLARY SANTOS, FEMUR,  TROCHANTER;  Surgeon: Eulalio De La Cruz MD;  Location: [facility] OR;  Service: Orthopedics;  Laterality: Left;    IRRIGATION AND DEBRIDEMENT OF LOWER EXTREMITY Left 1/19/2024    Procedure: IRRIGATION AND DEBRIDEMENT, LOWER EXTREMITY;  Surgeon: Bulmaro Tellez MD;  Location: [facility] OR 2ND FLR;  Service: Orthopedics;  Laterality: Left;    IRRIGATION AND DEBRIDEMENT OF LOWER EXTREMITY Left 2/15/2024    Procedure: IRRIGATION AND DEBRIDEMENT, LOWER EXTREMITY;  Surgeon: Bulmaro Tellez MD;  Location: [facility] OR 2ND FLR;  Service: Orthopedics;  Laterality: Left;    REPAIR OF EPIGASTRIC HERNIA N/A 12/7/2023    Procedure: REPAIR, HERNIA, EPIGASTRIC;  Surgeon: Vipul Escobar MD;  Location: [facility] OR;  Service: General;  Laterality: N/A;    SPINAL CORD STIMULATOR IMPLANT  09/18/2013    and removal    SPINE SURGERY  2006    lumbar L2-S1 decompression.    SPINE SURGERY      cervical decompression    TONSILLECTOMY       Family History       Problem Relation (Age of Onset)    COPD Brother    Coronary artery disease Father    Depression Father    Diabetes Mother, Father, Sister, Brother    Heart disease Father    Hypertension Mother, Father    Irritable bowel syndrome Mother          Tobacco Use    Smoking status: Never    Smokeless tobacco: Never   Substance and Sexual Activity    Alcohol use: No    Drug use: No    Sexual activity: Not Currently     Review of Systems   Constitutional:  Negative for activity change and appetite change.   Respiratory:  Negative for apnea.    Cardiovascular:  Negative for chest pain and leg swelling.   Gastrointestinal:  Positive for abdominal pain and nausea. Negative for abdominal distention and vomiting.   Skin:  Negative for color change.     Objective:     Vital Signs (Most Recent):  Temp: 98.1 °F (36.7 °C) (09/07/24 2348)  Pulse: 66 (09/08/24 0530)  Resp: 20 (09/08/24 0822)  BP: (!) 159/77 (09/08/24 0530)  SpO2: 98 % (09/08/24 0530) Vital Signs (24h Range):  Temp:  [98.1 °F (36.7 °C)] 98.1 °F  (36.7 °C)  Pulse:  [66-84] 66  Resp:  [15-20] 20  SpO2:  [98 %-100 %] 98 %  BP: (156-210)/(74-96) 159/77     Weight: (P) 57.4 kg (126 lb 8.7 oz)  Body mass index is 20.42 kg/m² (pended).     Physical Exam  Vitals reviewed.   Cardiovascular:      Rate and Rhythm: Normal rate.      Pulses: Normal pulses.   Pulmonary:      Effort: Pulmonary effort is normal.   Abdominal:      General: Abdomen is flat. Bowel sounds are normal. There is no distension.      Tenderness: There is no abdominal tenderness.      Hernia: No hernia is present.   Neurological:      Mental Status: She is alert.   Psychiatric:         Mood and Affect: Mood normal.            I have reviewed all pertinent lab results within the past 24 hours.  CBC:   Recent Labs   Lab 09/07/24  2356   WBC 4.78   RBC 3.81*   HGB 10.4*   HCT 33.2*      MCV 87   MCH 27.3   MCHC 31.3*     BMP:   Recent Labs   Lab 09/07/24  2356   *      K 3.5      CO2 22*   BUN 14   CREATININE 0.7   CALCIUM 9.0       Significant Diagnostics:  CT scan reviewed.  Distended gallbladder noted.  Mild dilation of biliary ducts.  Large gallstone noted    Ultrasound reviewed.  No evidence of ductal dilatation.  Large gallstone noted with distended gallbladder and findings consistent with cholecystitis    Assessment/Plan:     Disease of biliary tract  To OR tody for lap ashlie        VTE Risk Mitigation (From admission, onward)           Ordered     enoxaparin injection 40 mg  Daily         09/08/24 0607     IP VTE HIGH RISK PATIENT  Once         09/08/24 0607     Place sequential compression device  Until discontinued         09/08/24 0607                    Thank you for your consult. I will follow-up with patient. Please contact us if you have any additional questions.    Cipriano Ambrosio MD  General Surgery  [facility]

## 2024-09-08 NOTE — PLAN OF CARE
09/08/24 0847   Readmission   Was this a planned readmission? No   Why were you hospitalized in the last 30 days? seizure   Why were you readmitted? New medical problem   When you left the hospital how did you feel? good   When you left the hospital where did you go? Home with Family   Did patient/caregiver refused recommended DC plan? No   Tell me about what happened between when you left the hospital and the day you returned. my stomach started hurting with N/V/D   When did you start not feeling well? a week ago   Did you try to manage your symptoms your self? Yes   What did you do? meds & rest   Did you call anyone? No   Did you try to see or did see a doctor or nurse before you came? No   Did you have  a follow-up appointment on discharge? No

## 2024-09-08 NOTE — PROGRESS NOTES
Pharmacist Adjustment Note    Froilan Ray is a 78 y.o. female being treated with Zosyn.     Patient Data:    Vital Signs (Most Recent):  Temp: 98.1 °F (36.7 °C) (09/07/24 2348)  Pulse: 66 (09/08/24 0530)  Resp: 20 (09/08/24 0530)  BP: (!) 159/77 (09/08/24 0530)  SpO2: 98 % (09/08/24 0530) Vital Signs (72h Range):  Temp:  [98.1 °F (36.7 °C)]   Pulse:  [66-84]   Resp:  [15-20]   BP: (156-210)/(74-96)   SpO2:  [98 %-100 %]      Recent Labs   Lab 09/07/24  2356   CREATININE 0.7     Serum creatinine: 0.7 mg/dL 09/07/24 2356  Estimated creatinine clearance: 63.7 mL/min    Zosyn 4.5 g every 6 hours will be changed to Zosyn 4.5 g every 8 hours per pharmacy protocol.    Pharmacist's Name: Eliana Hernandez  Pharmacist's Extension: [phone]

## 2024-09-08 NOTE — ASSESSMENT & PLAN NOTE
Patient hypertensive, likely aspect of pain  Patient's home antihypertensive amlodipine-benazepril not available on formulary  We will give amlodipine 5 mg q.day, patient may resume her home antihypertensives on discharge

## 2024-09-08 NOTE — SUBJECTIVE & OBJECTIVE
Interval History:  Patient is seen postprocedure this afternoon.  She is resting comfortably enjoying a meal.  She states that she does still have some postoperative abdominal discomfort but overall feels quite well.  She denies any nausea or vomiting.  She denies any subjective fever or chills.  She is in good spirits.    Review of Systems   Constitutional:  Negative for activity change and appetite change.   Respiratory:  Negative for apnea.    Cardiovascular:  Negative for chest pain and leg swelling.   Gastrointestinal:  Positive for abdominal pain. Negative for abdominal distention and vomiting.   Skin:  Negative for color change.     Objective:     Vital Signs (Most Recent):  Temp: 97.7 °F (36.5 °C) (09/08/24 1333)  Pulse: 72 (09/08/24 1533)  Resp: 16 (09/08/24 1533)  BP: (!) 171/82 (09/08/24 1333)  SpO2: 97 % (09/08/24 1533) Vital Signs (24h Range):  Temp:  [97.1 °F (36.2 °C)-98.1 °F (36.7 °C)] 97.7 °F (36.5 °C)  Pulse:  [60-84] 72  Resp:  [12-20] 16  SpO2:  [97 %-100 %] 97 %  BP: (156-220)/(74-96) 171/82     Weight: (P) 57.4 kg (126 lb 8.7 oz)  Body mass index is 20.42 kg/m² (pended).    Intake/Output Summary (Last 24 hours) at 9/8/2024 1644  Last data filed at 9/8/2024 1119  Gross per 24 hour   Intake 1300 ml   Output --   Net 1300 ml         Physical Exam  Vitals reviewed.   HENT:      Head: Normocephalic and atraumatic.      Nose: No rhinorrhea.   Eyes:      Extraocular Movements: Extraocular movements intact.   Cardiovascular:      Rate and Rhythm: Normal rate.      Pulses: Normal pulses.   Pulmonary:      Effort: Pulmonary effort is normal.   Abdominal:      General: Abdomen is flat. Bowel sounds are normal. There is no distension.   Musculoskeletal:      Right lower leg: No edema.      Left lower leg: No edema.   Skin:     General: Skin is warm and dry.   Neurological:      Mental Status: She is alert.   Psychiatric:         Mood and Affect: Mood normal.             Significant Labs: All pertinent labs  within the past 24 hours have been reviewed.  CBC:   Recent Labs   Lab 09/07/24  2356   WBC 4.78   HGB 10.4*   HCT 33.2*        CMP:   Recent Labs   Lab 09/07/24  2356      K 3.5      CO2 22*   *   BUN 14   CREATININE 0.7   CALCIUM 9.0   PROT 6.3   ALBUMIN 3.6   BILITOT 0.6   ALKPHOS 85   AST 18   ALT 13   ANIONGAP 10       Significant Imaging: I have reviewed all pertinent imaging results/findings within the past 24 hours.  I have reviewed and interpreted all pertinent imaging results/findings within the past 24 hours.

## 2024-09-08 NOTE — CARE UPDATE
09/08/24 1533   Patient Assessment/Suction   Level of Consciousness (AVPU) alert   Respiratory Effort Normal;Unlabored   Expansion/Accessory Muscles/Retractions no use of accessory muscles   All Lung Fields Breath Sounds clear   Rhythm/Pattern, Respiratory unlabored   Cough Frequency no cough   PRE-TX-O2   Device (Oxygen Therapy) room air   SpO2 97 %   Pulse Oximetry Type Intermittent   $ Pulse Oximetry - Multiple Charge Pulse Oximetry - Multiple   Pulse 72   Resp 16   Education   $ Education Other (see comment);15 min  (sats)

## 2024-09-08 NOTE — ED PROVIDER NOTES
Encounter Date: 9/7/2024       History     Chief Complaint   Patient presents with    Abdominal Pain    Vomiting    Diarrhea     Pt presents to ED via EMS c/o epigastric pain x 1 week. +diarrhea yesterday and +n/v this evening. Given 8mg Zofran IV pta and 150NS     HPI  78-year-old woman with a history of ulcer, left hip pain, hypertension, seizures, presents for evaluation of abdominal pain described as in her upper abdomen, sharp, intermittently over the last week getting worse tonight, she has diarrhea, she has nonbilious nonbloody nausea and vomiting starting today as well, no blood or black stuff in her stool.  She denies fever chills cough congestion chest pain shortness of breath change in urination.  She reports a history of a hysterectomy.  Review of patient's allergies indicates:  No Known Allergies  Past Medical History:   Diagnosis Date    Anemia     Arthritis     Bleeding ulcer 07/2016    Chronic pain     DDD (degenerative disc disease), cervical     DDD (degenerative disc disease), lumbar     Depression     Disc degeneration, lumbosacral     Diverticulitis     Encounter for blood transfusion     Hypertension     IBS (irritable bowel syndrome)     Neuropathy of both feet     Seizures     none  since 2017    Umbilical hernia 2020     Past Surgical History:   Procedure Laterality Date    ARTHROPLASTY, HIP, GIRDLESTONE, POSTERIOR APPROACH Left 1/19/2024    Procedure: ARTHROPLASTY, HIP, GIRDLESTONE, POSTERIOR APPROACH;  Surgeon: Bulmaro Tellez MD;  Location: [facility] OR [address] [place] [address];  Service: Orthopedics;  Laterality: Left;    ARTHROPLASTY, HIP, GIRDLESTONE, POSTERIOR APPROACH Left 1/25/2024    Procedure: Irrigation and debridement left hip,;  Surgeon: Juanito Painting MD;  Location: [facility] OR [address] [place] [address];  Service: Orthopedics;  Laterality: Left;    ARTHROPLASTY, HIP, GIRDLESTONE, POSTERIOR APPROACH Left 2/15/2024    Procedure: Revision hip antibiotic spacer head exchange, left, lateral, peg board, depuy  osteomed in room, [facility],;  Surgeon: Bulmaro Tellez MD;  Location: [facility] OR 2ND FLR;  Service: Orthopedics;  Laterality: Left;    BACK SURGERY      BREAST BIOPSY      BREAST SURGERY Right     lumpectomy    CAUDAL EPIDURAL STEROID INJECTION N/A 01/28/2022    Procedure: Injection-steroid-epidural-caudal;  Surgeon: Luzmaria Marcelino MD;  Location: [facility] OR;  Service: Pain Management;  Laterality: N/A;    COLONOSCOPY N/A 01/14/2022    Procedure: COLONOSCOPY;  Surgeon: Abby Landers MD;  Location: [facility] ENDO;  Service: Endoscopy;  Laterality: N/A;    ENDOSCOPIC ULTRASOUND OF UPPER GASTROINTESTINAL TRACT N/A 07/21/2020    Procedure: ULTRASOUND, UPPER GI TRACT, ENDOSCOPIC;  Surgeon: Marcio Nguyễn III, MD;  Location: [facility] ENDO;  Service: Endoscopy;  Laterality: N/A;    ESOPHAGOGASTRODUODENOSCOPY N/A 01/14/2022    Procedure: EGD (ESOPHAGOGASTRODUODENOSCOPY);  Surgeon: Abby Landers MD;  Location: [facility];  Service: Endoscopy;  Laterality: N/A;    ESOPHAGOGASTRODUODENOSCOPY N/A 06/10/2022    Procedure: EGD (ESOPHAGOGASTRODUODENOSCOPY);  Surgeon: Abby Landers MD;  Location: [facility] ENDO;  Service: Endoscopy;  Laterality: N/A;    EYE SURGERY      cataract    FLAP GRAFT, LOCAL Left 2/15/2024    Procedure: FLAP GRAFT, LOCAL;  Surgeon: Bulmaro Tellez MD;  Location: [facility] OR 2ND FLR;  Service: Orthopedics;  Laterality: Left;    HYSTERECTOMY      INTRAMEDULLARY RODDING OF TROCHANTER OF FEMUR Left 12/11/2018    Procedure: INSERTION, INTRAMEDULLARY SANTOS, FEMUR, TROCHANTER;  Surgeon: Eulalio De La Cruz MD;  Location: [facility] OR;  Service: Orthopedics;  Laterality: Left;    IRRIGATION AND DEBRIDEMENT OF LOWER EXTREMITY Left 1/19/2024    Procedure: IRRIGATION AND DEBRIDEMENT, LOWER EXTREMITY;  Surgeon: Bulmaro Tellez MD;  Location: [facility] OR 2ND FLR;  Service: Orthopedics;  Laterality: Left;    IRRIGATION AND DEBRIDEMENT OF LOWER EXTREMITY Left 2/15/2024    Procedure: IRRIGATION AND DEBRIDEMENT, LOWER  EXTREMITY;  Surgeon: Bulmaro Tellez MD;  Location: [address];  Service: Orthopedics;  Laterality: Left;    REPAIR OF EPIGASTRIC HERNIA N/A 12/7/2023    Procedure: REPAIR, HERNIA, EPIGASTRIC;  Surgeon: Vipul Escobar MD;  Location: [facility] OR;  Service: General;  Laterality: N/A;    SPINAL CORD STIMULATOR IMPLANT  09/18/2013    and removal    SPINE SURGERY  2006    lumbar L2-S1 decompression.    SPINE SURGERY      cervical decompression    TONSILLECTOMY       Family History   Problem Relation Name Age of Onset    Diabetes Mother      Hypertension Mother      Irritable bowel syndrome Mother      Diabetes Father          insulin dependent    Hypertension Father      Heart disease Father      Coronary artery disease Father      Depression Father      Diabetes Sister      Diabetes Brother      COPD Brother       Social History     Tobacco Use    Smoking status: Never    Smokeless tobacco: Never   Substance Use Topics    Alcohol use: No    Drug use: No     Review of Systems   All other systems reviewed and are negative.      Physical Exam     Initial Vitals [09/07/24 2348]   BP Pulse Resp Temp SpO2   (!) 210/96 75 20 98.1 °F (36.7 °C) 100 %      MAP       --         Physical Exam    Nursing note and vitals reviewed.  Constitutional: She appears well-developed. She is not diaphoretic.   HENT:   Head: Normocephalic and atraumatic.   Eyes: Right eye exhibits no discharge. Left eye exhibits no discharge.   Neck: No tracheal deviation present.   Cardiovascular:  Normal rate, regular rhythm and intact distal pulses.           Pulmonary/Chest: Breath sounds normal. No stridor. No respiratory distress.   Abdominal: Abdomen is soft. She exhibits no mass. There is abdominal tenderness.   Diffuse abdominal tenderness to palpation without rebound or guarding no hernia or mass appreciated abdomen is soft There is no rebound and no guarding.   Musculoskeletal:         General: No tenderness.     Neurological: She is alert  and oriented to person, place, and time.   Skin: Skin is warm and dry.         ED Course   Procedures  Labs Reviewed   CBC W/ AUTO DIFFERENTIAL - Abnormal       Result Value    WBC 4.78      RBC 3.81 (*)     Hemoglobin 10.4 (*)     Hematocrit 33.2 (*)     MCV 87      MCH 27.3      MCHC 31.3 (*)     RDW 15.1 (*)     Platelets 383      MPV 10.0      Immature Granulocytes 0.2      Gran # (ANC) 3.3      Immature Grans (Abs) 0.01      Lymph # 1.1      Mono # 0.3      Eos # 0.0      Baso # 0.03      nRBC 0      Gran % 68.3      Lymph % 23.2      Mono % 7.1      Eosinophil % 0.6      Basophil % 0.6      Differential Method Automated     COMPREHENSIVE METABOLIC PANEL - Abnormal    Sodium 137      Potassium 3.5      Chloride 105      CO2 22 (*)     Glucose 124 (*)     BUN 14      Creatinine 0.7      Calcium 9.0      Total Protein 6.3      Albumin 3.6      Total Bilirubin 0.6      Alkaline Phosphatase 85      AST 18      ALT 13      eGFR >60.0      Anion Gap 10     LIPASE    Lipase 14     URINALYSIS, REFLEX TO URINE CULTURE   ISTAT CREATININE    POC Creatinine 0.7      Sample VENOUS     ISTAT LACTATE    POC Lactate 1.04      Sample VENOUS     POCT LACTATE   POCT CREATININE          Imaging Results               US Abdomen Limited (Final result)  Result time 09/08/24 03:53:49      Final result by Lachelle Modi MD (09/08/24 03:53:49)                   Impression:      1.2 cm echogenic nonmobile focus in the gallbladder lumen suggestive of cholelithiasis or possibly a polyp.  Sonographic Aiken sign is reported to be positive by the ultrasound technologist.  This constellation of findings can be seen with acute cholecystitis in the appropriate clinical setting and clinical correlation advised with further assessment as warranted.      Electronically signed by: Lachelle Modi MD  Date:    09/08/2024  Time:    03:53               Narrative:    EXAMINATION:  US ABDOMEN LIMITED    CLINICAL HISTORY:  ruq;    TECHNIQUE:  Limited  ultrasound of the right upper quadrant of the abdomen (including pancreas, liver, gallbladder, common bile duct, and right kidney) was performed.    COMPARISON:  CT 09/08/2024    FINDINGS:  Please note this is a technically difficult exam due to bowel gas artifact and artifact relating to patient body habitus.    Pancreas: Obscured by bowel gas artifact.    IVC: Unremarkable in its visualized extent, partially obscured by bowel gas artifact.    Liver: Normal in size, measuring 13.5 cm. Homogeneous echotexture. No focal hepatic lesions.    Gallbladder: Non mild bowel echogenic focus measuring up to 1.2 cm adjacent to the gallbladder wall which could reflect large stone or polyp.  The gallbladder wall is at the upper limit of normal thickness measuring 3 mm.  Sonographic Aiken sign is reported to be positive by the ultrasound technologist.    Biliary system: The common duct measures 5 mm.    Right kidney: Measures 8.5 cm without evidence of hydronephrosis..    Miscellaneous: No upper abdominal ascites.    This report was flagged in Epic as abnormal.                                       X-Ray Chest AP Portable (Final result)  Result time 09/08/24 02:33:36      Final result by Lachelle Modi MD (09/08/24 02:33:36)                   Impression:      No acute intrathoracic abnormality identified on this single radiographic view of the chest.      Electronically signed by: Lachelle Modi MD  Date:    09/08/2024  Time:    02:33               Narrative:    EXAMINATION:  XR CHEST AP PORTABLE    CLINICAL HISTORY:  Epigastric pain    TECHNIQUE:  Single frontal view of the chest was performed.    COMPARISON:  08/06/2024    FINDINGS:  Cardiac monitoring leads overlie the chest.  The cardiomediastinal silhouette is unchanged in size and configuration allowing for differences in positioning/technique.  The lungs are symmetrically expanded with diffuse coarse/increased interstitial attenuation.  No evidence of confluent airspace  consolidation, substantial volume of pleural fluid or pneumothorax.  Osseous structures intact with degenerative change.                                       CT Abdomen Pelvis With IV Contrast NO Oral Contrast (Final result)  Result time 09/08/24 01:52:39      Final result by Lachelle Modi MD (09/08/24 01:52:39)                   Impression:      1. Abnormal wall thickening of the gastric antrum and pylorus which can be seen with gastritis.  Clinical correlation advised.  2. Wall thickening and questionable inflammatory change about the sigmoid and descending colon which can be seen with colitis of infectious, inflammatory or ischemic etiologies.  Clinical correlation advised.  3. Possible 7 mm calculus in the gallbladder lumen which is distended.  Continued intra and extrahepatic biliary ductal dilatation, slightly increased in extent from prior exam.  4. Postoperative change of left hip arthroplasty remote pelvic fractures.  5. Multiple additional findings as above.      Electronically signed by: Lachelle Modi MD  Date:    09/08/2024  Time:    01:52               Narrative:    EXAMINATION:  CT ABDOMEN PELVIS WITH IV CONTRAST    CLINICAL HISTORY:  Abdominal abscess/infection suspected;    TECHNIQUE:  Low dose axial images, sagittal and coronal reformations were obtained from the lung bases to the pubic symphysis following the IV administration of 100 mL of Omnipaque 350 .  No oral contrast.    COMPARISON:  CT pelvis 02/14/2024, CT abdomen and pelvis 01/16/2024    FINDINGS:  The visualized lung bases demonstrate dependent bibasilar atelectasis.  No pleural fluid or focal consolidation.  Left lower lobe calcified pulmonary granuloma.  The visualized portions of the heart and pericardium demonstrate calcification of the mitral apparatus.    The liver is normal in size with subcentimeter hepatic hypodensities too small to definitively characterize but similar to prior study.  The portal vein and splenic vein appear  patent.  The gallbladder is distended.  Possible 7 mm calculus in the gallbladder lumen noting the gallbladder is distended.  There is intra and extrahepatic biliary ductal dilatation, slightly increased in extent from prior exam.  The pancreas is atrophic without definite peripancreatic inflammatory change or fluid.  The spleen is unremarkable.  There is nonspecific nodular adrenal gland thickening.    The kidneys enhance symmetrically without evidence of hydronephrosis.  There is subcentimeter renal cortical hypodensities too small to definitively characterize.  There is a punctate nonobstructing calculus in the inferior pole of the right kidney.  The urinary bladder is distended noting definitive assessment of intrapelvic structures is limited due to artifact from left hip arthroplasty.    The abdominal aorta is nonaneurysmal with scattered atherosclerosis.  No retroperitoneal fluid/hemorrhage.    There is a small hiatal hernia present.  There is wall thickening of the gastric antrum/pylorus which can be seen with gastritis.  Clinical correlation is advised.  The visualized loops of small bowel demonstrate no evidence of obstruction.  Moderate volume of fecal material in the colon.  There is wall thickening of the descending and sigmoid colon and questionable inflammatory change which could relate to colitis of infectious, inflammatory or ischemic etiology.  The appendix is within normal limits.  No free intraperitoneal air, portal venous gas or ascites.  There is marked underlying levo scoliosis of the lumbar spine with associated degenerative change.    Postoperative change of left hip arthroplasty.  Remote fractures of the right pubic body and left inferior and superior pubic rami.                                       Medications   piperacillin-tazobactam 4.5 g in dextrose 5 % 100 mL IVPB (ready to mix) (has no administration in time range)   pregabalin capsule 200 mg (has no administration in time range)    morphine injection 2 mg (2 mg Intravenous Given 9/8/24 0007)   prochlorperazine injection Soln 5 mg (5 mg Intravenous Given 9/7/24 4214)   lactated ringers bolus 500 mL (0 mLs Intravenous Stopped 9/8/24 0113)   famotidine (PF) injection 20 mg (20 mg Intravenous Given 9/8/24 0002)   iohexoL (OMNIPAQUE 350) injection 100 mL (100 mLs Intravenous Given 9/8/24 0102)   piperacillin-tazobactam 4.5 g in dextrose 5 % 100 mL IVPB (ready to mix) (0 g Intravenous Stopped 9/8/24 0528)   morphine injection 2 mg (2 mg Intravenous Given 9/8/24 0518)     Medical Decision Making  Upper abdominal pain for a week worsening tonight nausea vomiting diarrhea she is hypertensive she has not been taking her antihypertensives otherwise stable vitals on exam she is uncomfortable but nontoxic appearing her abdomen is soft with diffuse tenderness without signs of juan peritonitis, broad differential including peptic ulcer disease gastritis biliary pathology gastroenteritis small-bowel other intra-abdominal pathologies not excluded, elected to get point of care creatinine to expedite scan, treat her pain, evaluate with labs.    CT with findings concerning for cholecystitis, with her upper abdominal pain I think it is reasonable to get ultrasound, this ultrasound as findings concerning for cholecystitis, gave her a dose of Zosyn, discussed with General surgery, her CBD is within normal limits on the ultrasound, we will admit to hospital medicine.  She has required multiple pain medicines.  She looks more comfortable.  Her blood pressure is better controlled at    Amount and/or Complexity of Data Reviewed  Independent Historian: EMS     Details: Received 8 mg of Zofran with EMS  External Data Reviewed: notes.     Details: Recent admission for seizures  Labs: ordered.  Radiology: ordered and independent interpretation performed. Decision-making details documented in ED Course.  ECG/medicine tests: ordered and independent interpretation  performed. Decision-making details documented in ED Course.    Risk  Prescription drug management.  Drug therapy requiring intensive monitoring for toxicity.  Decision regarding hospitalization.               ED Course as of 09/08/24 0602   Sun Sep 08, 2024   0519 Page general surgery [IC]   0507 Discussed with general surgery admit to hospital medicine [IC]      ED Course User Index  [IC] Niranjan Mac MD                           Clinical Impression:  Final diagnoses:  [R10.13] Epigastric abdominal pain  [K81.9] Cholecystitis (Primary)  [R11.2] Nausea and vomiting, unspecified vomiting type          ED Disposition Condition    Observation                 Niranjan Mac MD  09/08/24 0602

## 2024-09-08 NOTE — ASSESSMENT & PLAN NOTE
No concern for choledocholithiasis or ascending cholangitis on presentation, imaging clinical findings consistent with acute cholecystitis, neurosurgery consulted she is now status post laparoscopic cholecystectomy  defer to Dr. Valenzuela who will be taking over the case tomorrow, 9/9 is to the discontinuation of antibiotics versus a short postoperative course  Follow up with General surgery as needed in the outpatient setting

## 2024-09-08 NOTE — ANESTHESIA PREPROCEDURE EVALUATION
09/08/2024  Froilan Ray is a 78 y.o., female.    Patient Active Problem List   Diagnosis    Chronic pain with uncomplicated opioid dependence    Encounter for postoperative wound check    Uterine mass    Femur fracture, left    Essential hypertension    Peripheral neuropathy    Seizure disorder    Open wound of left heel    Drug-induced constipation    Disease of biliary tract    Severe anemia    Chronic gastric ulcer with bleeding    Iron deficiency anemia    Paroxysmal atrial fibrillation    Hypokalemia    Spinal stenosis    Uses walker    History of seizures    Anticoagulated    S/P ORIF (open reduction internal fixation) fracture    Osteoporosis    Depression    Closed fracture of acetabulum    Kyphoscoliosis and scoliosis    Strangulated hernia of abdominal wall    Chronic heart failure with preserved ejection fraction    Painful orthopaedic hardware    Staphylococcal arthritis of left hip    MRSA bacteremia    Oral lesion    Urinary retention    Antibiotic Spacer failure status post Girdlestone procedure    Hypocalcemia    Hypoalbuminemia    Vitamin D deficiency    Bacteriuria    Hypophosphatemia    PICC (peripherally inserted central catheter) in place    Seizure    Hypertension    Chronic pain    Chronic infection of left hip, currently on antibiotics    Post-ictal confusion       Past Surgical History:   Procedure Laterality Date    ARTHROPLASTY, HIP, GIRDLESTONE, POSTERIOR APPROACH Left 1/19/2024    Procedure: ARTHROPLASTY, HIP, GIRDLESTONE, POSTERIOR APPROACH;  Surgeon: Bulmaro Tellez MD;  Location: [facility] OR [address] [place] [address];  Service: Orthopedics;  Laterality: Left;    ARTHROPLASTY, HIP, GIRDLESTONE, POSTERIOR APPROACH Left 1/25/2024    Procedure: Irrigation and debridement left hip,;  Surgeon: Juanito Painting MD;  Location: [facility] OR [address] [place] [address];  Service: Orthopedics;  Laterality: Left;     ARTHROPLASTY, HIP, GIRDLESTONE, POSTERIOR APPROACH Left 2/15/2024    Procedure: Revision hip antibiotic spacer head exchange, left, lateral, peg board, depuy osteomed in room, vanc, ancef, txa,;  Surgeon: Bulmaro Tellez MD;  Location: [facility] OR 2ND FLR;  Service: Orthopedics;  Laterality: Left;    BACK SURGERY      BREAST BIOPSY      BREAST SURGERY Right     lumpectomy    CAUDAL EPIDURAL STEROID INJECTION N/A 01/28/2022    Procedure: Injection-steroid-epidural-caudal;  Surgeon: Luzmaria Marcelino MD;  Location: [facility] OR;  Service: Pain Management;  Laterality: N/A;    COLONOSCOPY N/A 01/14/2022    Procedure: COLONOSCOPY;  Surgeon: Abby Landers MD;  Location: [facility];  Service: Endoscopy;  Laterality: N/A;    ENDOSCOPIC ULTRASOUND OF UPPER GASTROINTESTINAL TRACT N/A 07/21/2020    Procedure: ULTRASOUND, UPPER GI TRACT, ENDOSCOPIC;  Surgeon: Marcio Nguyễn III, MD;  Location: [facility] ENDO;  Service: Endoscopy;  Laterality: N/A;    ESOPHAGOGASTRODUODENOSCOPY N/A 01/14/2022    Procedure: EGD (ESOPHAGOGASTRODUODENOSCOPY);  Surgeon: Abby Landers MD;  Location: [facility] ENDO;  Service: Endoscopy;  Laterality: N/A;    ESOPHAGOGASTRODUODENOSCOPY N/A 06/10/2022    Procedure: EGD (ESOPHAGOGASTRODUODENOSCOPY);  Surgeon: Abby Landers MD;  Location: [facility] ENDO;  Service: Endoscopy;  Laterality: N/A;    EYE SURGERY      cataract    FLAP GRAFT, LOCAL Left 2/15/2024    Procedure: FLAP GRAFT, LOCAL;  Surgeon: Bulmaro Tellez MD;  Location: [facility] OR 2ND FLR;  Service: Orthopedics;  Laterality: Left;    HYSTERECTOMY      INTRAMEDULLARY RODDING OF TROCHANTER OF FEMUR Left 12/11/2018    Procedure: INSERTION, INTRAMEDULLARY SANTOS, FEMUR, TROCHANTER;  Surgeon: Eulalio De La Cruz MD;  Location: [facility] OR;  Service: Orthopedics;  Laterality: Left;    IRRIGATION AND DEBRIDEMENT OF LOWER EXTREMITY Left 1/19/2024    Procedure: IRRIGATION AND DEBRIDEMENT, LOWER EXTREMITY;  Surgeon: Bulmaro Tellez MD;  Location: [facility] OR 2ND FLR;   Service: Orthopedics;  Laterality: Left;    IRRIGATION AND DEBRIDEMENT OF LOWER EXTREMITY Left 2/15/2024    Procedure: IRRIGATION AND DEBRIDEMENT, LOWER EXTREMITY;  Surgeon: Bulmaro Tellez MD;  Location: [address];  Service: Orthopedics;  Laterality: Left;    REPAIR OF EPIGASTRIC HERNIA N/A 12/7/2023    Procedure: REPAIR, HERNIA, EPIGASTRIC;  Surgeon: Vipul Escobar MD;  Location: [facility];  Service: General;  Laterality: N/A;    SPINAL CORD STIMULATOR IMPLANT  09/18/2013    and removal    SPINE SURGERY  2006    lumbar L2-S1 decompression.    SPINE SURGERY      cervical decompression    TONSILLECTOMY          Tobacco Use:  The patient  reports that she has never smoked. She has never used smokeless tobacco.     Results for orders placed or performed during the hospital encounter of 08/06/24   ECG 12 lead    Collection Time: 08/06/24  9:40 AM   Result Value Ref Range    QRS Duration 82 ms    [facility] QTC Calculation 474 ms    Narrative    Test Reason : R29.818,    Vent. Rate : 108 BPM     Atrial Rate : 108 BPM     P-R Int : 148 ms          QRS Dur : 082 ms      QT Int : 354 ms       P-R-T Axes : 075 052 087 degrees     QTc Int : 474 ms    Sinus tachycardia  Nonspecific ST and T wave abnormality  Abnormal ECG  When compared with ECG of 06-AUG-2024 09:31,  No significant change was found  Confirmed by Saran REID, Pranav VANESSA ([id number]) on 8/6/2024 10:03:21 PM    Referred By: [email or web address]   SELF           Confirmed By:Pranav Noonan MD        Imaging Results               US Abdomen Limited (Final result)  Result time 09/08/24 03:53:49      Final result by Lachelle Modi MD (09/08/24 03:53:49)                   Impression:      1.2 cm echogenic nonmobile focus in the gallbladder lumen suggestive of cholelithiasis or possibly a polyp.  Sonographic Aiken sign is reported to be positive by the ultrasound technologist.  This constellation of findings can be seen with acute cholecystitis in the appropriate clinical  setting and clinical correlation advised with further assessment as warranted.      Electronically signed by: Lachelle Modi MD  Date:    09/08/2024  Time:    03:53               Narrative:    EXAMINATION:  US ABDOMEN LIMITED    CLINICAL HISTORY:  ruq;    TECHNIQUE:  Limited ultrasound of the right upper quadrant of the abdomen (including pancreas, liver, gallbladder, common bile duct, and right kidney) was performed.    COMPARISON:  CT 09/08/2024    FINDINGS:  Please note this is a technically difficult exam due to bowel gas artifact and artifact relating to patient body habitus.    Pancreas: Obscured by bowel gas artifact.    IVC: Unremarkable in its visualized extent, partially obscured by bowel gas artifact.    Liver: Normal in size, measuring 13.5 cm. Homogeneous echotexture. No focal hepatic lesions.    Gallbladder: Non mild bowel echogenic focus measuring up to 1.2 cm adjacent to the gallbladder wall which could reflect large stone or polyp.  The gallbladder wall is at the upper limit of normal thickness measuring 3 mm.  Sonographic Aiken sign is reported to be positive by the ultrasound technologist.    Biliary system: The common duct measures 5 mm.    Right kidney: Measures 8.5 cm without evidence of hydronephrosis..    Miscellaneous: No upper abdominal ascites.    This report was flagged in Epic as abnormal.                                       X-Ray Chest AP Portable (Final result)  Result time 09/08/24 02:33:36      Final result by Lachelle Modi MD (09/08/24 02:33:36)                   Impression:      No acute intrathoracic abnormality identified on this single radiographic view of the chest.      Electronically signed by: Lachelle Modi MD  Date:    09/08/2024  Time:    02:33               Narrative:    EXAMINATION:  XR CHEST AP PORTABLE    CLINICAL HISTORY:  Epigastric pain    TECHNIQUE:  Single frontal view of the chest was performed.    COMPARISON:  08/06/2024    FINDINGS:  Cardiac monitoring leads  overlie the chest.  The cardiomediastinal silhouette is unchanged in size and configuration allowing for differences in positioning/technique.  The lungs are symmetrically expanded with diffuse coarse/increased interstitial attenuation.  No evidence of confluent airspace consolidation, substantial volume of pleural fluid or pneumothorax.  Osseous structures intact with degenerative change.                                       CT Abdomen Pelvis With IV Contrast NO Oral Contrast (Final result)  Result time 09/08/24 01:52:39      Final result by Lachelle Modi MD (09/08/24 01:52:39)                   Impression:      1. Abnormal wall thickening of the gastric antrum and pylorus which can be seen with gastritis.  Clinical correlation advised.  2. Wall thickening and questionable inflammatory change about the sigmoid and descending colon which can be seen with colitis of infectious, inflammatory or ischemic etiologies.  Clinical correlation advised.  3. Possible 7 mm calculus in the gallbladder lumen which is distended.  Continued intra and extrahepatic biliary ductal dilatation, slightly increased in extent from prior exam.  4. Postoperative change of left hip arthroplasty remote pelvic fractures.  5. Multiple additional findings as above.      Electronically signed by: Lachelle Modi MD  Date:    09/08/2024  Time:    01:52               Narrative:    EXAMINATION:  CT ABDOMEN PELVIS WITH IV CONTRAST    CLINICAL HISTORY:  Abdominal abscess/infection suspected;    TECHNIQUE:  Low dose axial images, sagittal and coronal reformations were obtained from the lung bases to the pubic symphysis following the IV administration of 100 mL of Omnipaque 350 .  No oral contrast.    COMPARISON:  CT pelvis 02/14/2024, CT abdomen and pelvis 01/16/2024    FINDINGS:  The visualized lung bases demonstrate dependent bibasilar atelectasis.  No pleural fluid or focal consolidation.  Left lower lobe calcified pulmonary granuloma.  The  visualized portions of the heart and pericardium demonstrate calcification of the mitral apparatus.    The liver is normal in size with subcentimeter hepatic hypodensities too small to definitively characterize but similar to prior study.  The portal vein and splenic vein appear patent.  The gallbladder is distended.  Possible 7 mm calculus in the gallbladder lumen noting the gallbladder is distended.  There is intra and extrahepatic biliary ductal dilatation, slightly increased in extent from prior exam.  The pancreas is atrophic without definite peripancreatic inflammatory change or fluid.  The spleen is unremarkable.  There is nonspecific nodular adrenal gland thickening.    The kidneys enhance symmetrically without evidence of hydronephrosis.  There is subcentimeter renal cortical hypodensities too small to definitively characterize.  There is a punctate nonobstructing calculus in the inferior pole of the right kidney.  The urinary bladder is distended noting definitive assessment of intrapelvic structures is limited due to artifact from left hip arthroplasty.    The abdominal aorta is nonaneurysmal with scattered atherosclerosis.  No retroperitoneal fluid/hemorrhage.    There is a small hiatal hernia present.  There is wall thickening of the gastric antrum/pylorus which can be seen with gastritis.  Clinical correlation is advised.  The visualized loops of small bowel demonstrate no evidence of obstruction.  Moderate volume of fecal material in the colon.  There is wall thickening of the descending and sigmoid colon and questionable inflammatory change which could relate to colitis of infectious, inflammatory or ischemic etiology.  The appendix is within normal limits.  No free intraperitoneal air, portal venous gas or ascites.  There is marked underlying levo scoliosis of the lumbar spine with associated degenerative change.    Postoperative change of left hip arthroplasty.  Remote fractures of the right pubic  body and left inferior and superior pubic rami.                                       Lab Results   Component Value Date    WBC 4.78 09/07/2024    HGB 10.4 (L) 09/07/2024    HCT 33.2 (L) 09/07/2024    MCV 87 09/07/2024     09/07/2024     BMP  Lab Results   Component Value Date     09/07/2024    K 3.5 09/07/2024     09/07/2024    CO2 22 (L) 09/07/2024    BUN 14 09/07/2024    CREATININE 0.7 09/07/2024    CALCIUM 9.0 09/07/2024    ANIONGAP 10 09/07/2024     (H) 09/07/2024     08/08/2024     08/07/2024       Results for orders placed during the hospital encounter of 01/17/24    Echo    Interpretation Summary    Left Ventricle: The left ventricle is normal in size. Normal wall thickness. There is concentric remodeling. Normal wall motion. There is low normal systolic function with a visually estimated ejection fraction of 50 - 55%. There is normal diastolic function.    Right Ventricle: Normal right ventricular cavity size. Wall thickness is normal. Right ventricle wall motion  is normal. Systolic function is normal.    Aortic Valve: There is moderate aortic valve sclerosis. There is moderate annular calcification present.    Mitral Valve: There is severe mitral annular calcification present.    Aorta: Aortic root is normal in size measuring 3.37 cm. Ascending aorta is normal measuring 3.51 cm.    Pulmonary Artery: The estimated pulmonary artery systolic pressure is 25 mmHg.    IVC/SVC: Normal venous pressure at 3 mmHg.           Pre-op Assessment    I have reviewed the Patient Summary Reports.     I have reviewed the Nursing Notes. I have reviewed the NPO Status.   I have reviewed the Medications.     Review of Systems  Anesthesia Hx:  No problems with previous Anesthesia   Neg history of prior surgery.          Denies Family Hx of Anesthesia complications.    Denies Personal Hx of Anesthesia complications.                    Social:  No Alcohol Use, Non-Smoker        Hematology/Oncology:       -- Anemia:               Hematology Comments: Hx of transfusion                    Cardiovascular:     Hypertension                                        Hepatic/GI:   PUD, (Gastric ulcer)               Musculoskeletal:  Arthritis          Spine Disorders: cervical and lumbar Disc disease, Degenerative disease and Chronic Pain           Neurological:       Seizures    Hx of spinal stimulator      Peripheral Neuropathy (feet)                          Psych:    depression                Physical Exam  General: Well nourished and Alert    Airway:  Mallampati: II   Mouth Opening: Normal  TM Distance: Normal  Tongue: Normal  Neck ROM: Normal ROM    Chest/Lungs:  Clear to auscultation, Normal Respiratory Rate    Heart:  Rate: Normal  Rhythm: Regular Rhythm  Sounds: Normal        Anesthesia Plan  Type of Anesthesia, risks & benefits discussed:    Anesthesia Type: Gen ETT  Intra-op Monitoring Plan: Standard ASA Monitors  Post Op Pain Control Plan: multimodal analgesia  Induction:  IV  Airway Plan: Video, Post-Induction  Informed Consent: Informed consent signed with the Patient and all parties understand the risks and agree with anesthesia plan.  All questions answered. Patient consented to blood products? Yes  ASA Score: 3  Anesthesia Plan Notes: Tylenol 1 gm  Zofran, Pepcid     Ready For Surgery From Anesthesia Perspective.     .

## 2024-09-08 NOTE — BRIEF OP NOTE
Formerly [facility], later [facility]  Brief Operative Note    SUMMARY     Surgery Date: 9/8/2024     Surgeons and Role:     * Cipriano Ambrosio MD - Primary    Assisting Surgeon: None    Pre-op Diagnosis:  Cholecystitis [K81.9]    Post-op Diagnosis:  Post-Op Diagnosis Codes:     * Cholecystitis [K81.9]    Procedure(s) (LRB):  CHOLECYSTECTOMY, LAPAROSCOPIC (N/A)    Anesthesia: General    Implants:  * No implants in log *    Operative Findings: Acute cholecystitis    Estimated Blood Loss: 25 cc's    Estimated Blood Loss has been documented.         Specimens:   Specimen (24h ago, onward)       Start     Ordered    09/08/24 1048  Specimen to Pathology - Surgery  Once        Comments: Pre-op Diagnosis: Cholecystitis [K81.9]Procedure(s):CHOLECYSTECTOMY, LAPAROSCOPIC Number of specimens: 1Name of specimens: 1. gallbladder     Question:  Release to patient  Answer:  Immediate    09/08/24 1574                    [id number]

## 2024-09-08 NOTE — HPI
This is a pleasant 78-year-old female who presented to the ER overnight with the abdominal pain and tenderness in the upper abdomen.  Notes the pain came on rather suddenly.  Reports mild nausea no emesis.  States pain might be a little bit better today.  On presentation to the ER she had a CT scan and ultrasound which did demonstrate findings consistent with cholecystitis.  Also incidental findings of questionable thickening of the sigmoid colon.  Surgery was consulted for evaluation.  Patient with no significant past medical history.  Has had previous umbilical hernia repair

## 2024-09-08 NOTE — SUBJECTIVE & OBJECTIVE
No current facility-administered medications on file prior to encounter.     Current Outpatient Medications on File Prior to Encounter   Medication Sig    acetaminophen (TYLENOL) 325 MG tablet Take 2 tablets (650 mg total) by mouth every 8 (eight) hours as needed for Pain (pain scale 1-4).    amlodipine-benazepril 5-20 mg (LOTREL) 5-20 mg per capsule Take 1 capsule by mouth once daily.    calcium carbonate (TUMS) 200 mg calcium (500 mg) chewable tablet Chew and swallow 2 tablets (1,000 mg total) by mouth once daily.    cyclobenzaprine (FLEXERIL) 10 MG tablet Take 1 tablet (10 mg total) by mouth 3 (three) times daily as needed for Muscle spasms.    docusate sodium (COLACE) 100 MG capsule Take 1 capsule (100 mg total) by mouth 2 (two) times daily.    ergocalciferol (ERGOCALCIFEROL) 50,000 unit Cap Take 1 capsule (50,000 Units total) by mouth every 7 days.    EScitalopram oxalate (LEXAPRO) 20 MG tablet Take 1 tablet (20 mg total) by mouth every evening.    levETIRAcetam (KEPPRA) 500 MG Tab Take 1 tablet (500 mg total) by mouth 2 (two) times daily.    nystatin (MYCOSTATIN) powder Apply to affected area 3 times daily (Patient taking differently: Apply 1 g topically 3 (three) times daily. Apply to affected area 3 times daily)    omeprazole (PRILOSEC) 40 MG capsule TAKE 1 CAPSULE(40 MG) BY MOUTH EVERY MORNING    OPW TEST CLAIM - DO NOT FILL OPW test claim. Do not fill.    oxyCODONE-acetaminophen (PERCOCET)  mg per tablet Take 1 tablet by mouth every 4 (four) hours as needed for Pain.    polyethylene glycol (GLYCOLAX) 17 gram/dose powder Use to cap to measure 17 grams, mix with liquid, and take by mouth once daily.    pregabalin (LYRICA) 200 MG Cap Take 1 capsule (200 mg total) by mouth 3 (three) times daily.    sodium chloride 0.9% SolP 50 mL with DAPTOmycin 500 mg SolR 676 mg Inject 676 mg into the vein Daily. End date 3/28/24    [DISCONTINUED] pantoprazole (PROTONIX) 40 MG tablet Take 1 tablet (40 mg total) by  mouth 2 (two) times daily.       Review of patient's allergies indicates:  No Known Allergies    Past Medical History:   Diagnosis Date    Anemia     Arthritis     Bleeding ulcer 07/2016    Chronic pain     DDD (degenerative disc disease), cervical     DDD (degenerative disc disease), lumbar     Depression     Disc degeneration, lumbosacral     Diverticulitis     Encounter for blood transfusion     Hypertension     IBS (irritable bowel syndrome)     Neuropathy of both feet     Seizures     none  since 2017    Umbilical hernia 2020     Past Surgical History:   Procedure Laterality Date    ARTHROPLASTY, HIP, GIRDLESTONE, POSTERIOR APPROACH Left 1/19/2024    Procedure: ARTHROPLASTY, HIP, GIRDLESTONE, POSTERIOR APPROACH;  Surgeon: Bulmaro Tellez MD;  [facility];  Service: Orthopedics;  Laterality: Left;    ARTHROPLASTY, HIP, GIRDLESTONE, POSTERIOR APPROACH Left 1/25/2024    Procedure: Irrigation and debridement left hip,;  Surgeon: Juanito Painting MD;  [facility];  Service: Orthopedics;  Laterality: Left;    ARTHROPLASTY, HIP, GIRDLESTONE, POSTERIOR APPROACH Left 2/15/2024    Procedure: Revision hip antibiotic spacer head exchange, left, lateral, peg board, depuy osteomed in room, vanc, ancef, txa,;  Surgeon: Bulmaro Tellez MD;  [facility];  Service: Orthopedics;  Laterality: Left;    BACK SURGERY      BREAST BIOPSY      BREAST SURGERY Right     lumpectomy    CAUDAL EPIDURAL STEROID INJECTION N/A 01/28/2022    Procedure: Injection-steroid-epidural-caudal;  Surgeon: Luzmaria Marcelino MD;  Location: [facility] OR;  Service: Pain Management;  Laterality: N/A;    COLONOSCOPY N/A 01/14/2022    Procedure: COLONOSCOPY;  Surgeon: Abby Landers MD;  Location: [facility] ENDO;  Service: Endoscopy;  Laterality: N/A;    ENDOSCOPIC ULTRASOUND OF UPPER GASTROINTESTINAL TRACT N/A 07/21/2020    Procedure: ULTRASOUND, UPPER GI TRACT, ENDOSCOPIC;  Surgeon: Marcio Nguyễn III,  MD;  Location: [facility] ENDO;  Service: Endoscopy;  Laterality: N/A;    ESOPHAGOGASTRODUODENOSCOPY N/A 01/14/2022    Procedure: EGD (ESOPHAGOGASTRODUODENOSCOPY);  Surgeon: Abby Landers MD;  Location: [facility] ENDO;  Service: Endoscopy;  Laterality: N/A;    ESOPHAGOGASTRODUODENOSCOPY N/A 06/10/2022    Procedure: EGD (ESOPHAGOGASTRODUODENOSCOPY);  Surgeon: Abby Landers MD;  Location: [facility] ENDO;  Service: Endoscopy;  Laterality: N/A;    EYE SURGERY      cataract    FLAP GRAFT, LOCAL Left 2/15/2024    Procedure: FLAP GRAFT, LOCAL;  Surgeon: Bulmaro Tellez MD;  Location: [facility] OR [facility] FLR;  Service: Orthopedics;  Laterality: Left;    HYSTERECTOMY      INTRAMEDULLARY RODDING OF TROCHANTER OF FEMUR Left 12/11/2018    Procedure: INSERTION, INTRAMEDULLARY SANTOS, FEMUR, TROCHANTER;  Surgeon: Eulalio De La Cruz MD;  Location: [facility];  Service: Orthopedics;  Laterality: Left;    IRRIGATION AND DEBRIDEMENT OF LOWER EXTREMITY Left 1/19/2024    Procedure: IRRIGATION AND DEBRIDEMENT, LOWER EXTREMITY;  Surgeon: Bulmaro Tellez MD;  Location: [facility] OR 2ND FLR;  Service: Orthopedics;  Laterality: Left;    IRRIGATION AND DEBRIDEMENT OF LOWER EXTREMITY Left 2/15/2024    Procedure: IRRIGATION AND DEBRIDEMENT, LOWER EXTREMITY;  Surgeon: Bulmaro Tellez MD;  Location: [facility] OR 2ND FLR;  Service: Orthopedics;  Laterality: Left;    REPAIR OF EPIGASTRIC HERNIA N/A 12/7/2023    Procedure: REPAIR, HERNIA, EPIGASTRIC;  Surgeon: Vipul Escobar MD;  Location: [facility] OR;  Service: General;  Laterality: N/A;    SPINAL CORD STIMULATOR IMPLANT  09/18/2013    and removal    SPINE SURGERY  2006    lumbar L2-S1 decompression.    SPINE SURGERY      cervical decompression    TONSILLECTOMY       Family History       Problem Relation (Age of Onset)    COPD Brother    Coronary artery disease Father    Depression Father    Diabetes Mother, Father, Sister, Brother    Heart disease Father    Hypertension Mother, Father    Irritable bowel syndrome  Mother          Tobacco Use    Smoking status: Never    Smokeless tobacco: Never   Substance and Sexual Activity    Alcohol use: No    Drug use: No    Sexual activity: Not Currently     Review of Systems   Constitutional:  Negative for activity change and appetite change.   Respiratory:  Negative for apnea.    Cardiovascular:  Negative for chest pain and leg swelling.   Gastrointestinal:  Positive for abdominal pain and nausea. Negative for abdominal distention and vomiting.   Skin:  Negative for color change.     Objective:     Vital Signs (Most Recent):  Temp: 98.1 °F (36.7 °C) (09/07/24 2348)  Pulse: 66 (09/08/24 0530)  Resp: 20 (09/08/24 0822)  BP: (!) 159/77 (09/08/24 0530)  SpO2: 98 % (09/08/24 0530) Vital Signs (24h Range):  Temp:  [98.1 °F (36.7 °C)] 98.1 °F (36.7 °C)  Pulse:  [66-84] 66  Resp:  [15-20] 20  SpO2:  [98 %-100 %] 98 %  BP: (156-210)/(74-96) 159/77     Weight: (P) 57.4 kg (126 lb 8.7 oz)  Body mass index is 20.42 kg/m² (pended).     Physical Exam  Vitals reviewed.   Cardiovascular:      Rate and Rhythm: Normal rate.      Pulses: Normal pulses.   Pulmonary:      Effort: Pulmonary effort is normal.   Abdominal:      General: Abdomen is flat. Bowel sounds are normal. There is no distension.      Tenderness: There is no abdominal tenderness.      Hernia: No hernia is present.   Neurological:      Mental Status: She is alert.   Psychiatric:         Mood and Affect: Mood normal.            I have reviewed all pertinent lab results within the past 24 hours.  CBC:   Recent Labs   Lab 09/07/24  2356   WBC 4.78   RBC 3.81*   HGB 10.4*   HCT 33.2*      MCV 87   MCH 27.3   MCHC 31.3*     BMP:   Recent Labs   Lab 09/07/24  2356   *      K 3.5      CO2 22*   BUN 14   CREATININE 0.7   CALCIUM 9.0       Significant Diagnostics:  CT scan reviewed.  Distended gallbladder noted.  Mild dilation of biliary ducts.  Large gallstone noted    Ultrasound reviewed.  No evidence of ductal  dilatation.  Large gallstone noted with distended gallbladder and findings consistent with cholecystitis

## 2024-09-08 NOTE — NURSING
Patient returned back from surgery, no distress noted at this time, lap sites clean, dry and intact with dermabond.

## 2024-09-08 NOTE — OP NOTE
Date of surgery:  9/8/24    Staff surgeon:  Dr. Cipriano Ambrosio    Preoperative diagnosis:  Acute cholecystitis    Postoperative diagnosis:  The same    Procedure:  same    Anesthesia:  General endotracheal anesthesia    Indication for procedure:  Pleasant 77 yo F who presented to the ER with signs/symptoms consistent with acute cholecystitis.       Description of procedure:  Following signing informed consent patient in the operating room placed supine position.  General endotracheal anesthesia was administered without event.  The abdomen is prepped and draped in standard fashion and appropriate time-out procedure was then performed.  Next a  small transverse incision above the umbilicus is made and carried down to  the deep dermal tissue.  The abdominal cavity is entered with a  Veress needle and pneumoperitoneum to 15 mmHg is established.  Next the abdomen is entered with an Optiview trocar under direct visualization.  Patient placed in reverse Trendelenburg and tilted towards the left side.  A 5 mm is placed in the epigastric trocar and 2 in the right subcostal margin.  All trocars were placed under direct visualization and after injection of quarter percent Marcaine with epi.  The fundus was grasped and held over the patient's liver.  This exposed the infundibulum which was grasped and held towards the patient's feet.  Next the triangle of Calot is dissected identifying the cystic duct and cystic artery in their usual anatomic locations. Two clips are placed distally on the cystic duct 1 clip proximally.  Two clips are placed on the cystic artery. The duct and artery are cut between clips.  Next cautery is used to remove the gallbladder from the hepatic fossa.  The gallbladder is then removed from the abdominal cavity with assistance of an Endo-Catch bag.  Having removed the gallbladder the hepatic fossa is evaluated  once again to ensure adequate hemostasis.  Next all trocars are removed under direct  visualization.  I closed the umbilical fascia with 0 Vicryl suture.  Skin incision was closed with 4-0 Monocryl.  Patient is extubated taken recovery room stable condition.  Blood loss was 10 cc's

## 2024-09-08 NOTE — TRANSFER OF CARE
"Anesthesia Transfer of Care Note    Patient: Froilan Ray    Procedure(s) Performed: Procedure(s) (LRB):  CHOLECYSTECTOMY, LAPAROSCOPIC (N/A)    Patient location: PACU    Anesthesia Type: general    Transport from OR: Transported from OR on room air with adequate spontaneous ventilation    Post pain: adequate analgesia    Post assessment: no apparent anesthetic complications and tolerated procedure well    Post vital signs: stable    Level of consciousness: awake    Nausea/Vomiting: no nausea/vomiting    Complications: none    Transfer of care protocol was followed    Last vitals: Visit Vitals  BP (!) 159/77   Pulse 66   Temp 36.7 °C (98.1 °F) (Oral)   Resp 20   Ht 5' 6" (1.676 m)   Wt (P) 57.4 kg (126 lb 8.7 oz)   SpO2 98%   BMI (P) 20.42 kg/m²     "

## 2024-09-08 NOTE — H&P
[facility]         HISTORY & PHYSICAL   HOSPITALIST ADMISSION NOTE    Admitting MD: Yogesh Hoang MD             Patient Name:Froilan Ray  Patient :1945   Patient Age/Gender: 78 y.o. female  MRN:[id number]  CSN:[id number]  Status: OP- Observation  Admission Date:2024  Date of Service:2024               .HISTORY OF PRESENT ILLNESS   Chief Complaint:   Chief Complaint   Patient presents with    Abdominal Pain    Vomiting    Diarrhea     Pt presents to ED via EMS c/o epigastric pain x 1 week. +diarrhea yesterday and +n/v this evening. Given 8mg Zofran IV pta and 150NS        Principal Problem:   <principal problem not specified>    Patient Information Source(s):      patient and ER records.    HPI:  Froilan Ray is a 78 y.o. female who has a history of  seizures, anemia, peptic ulcer disease, IBS, hypertension, diverticulitis,  and presents with abdominal pain    About a week ago, the patient started having epigastric abdominal pain somewhat radiating to the right severe and constant.Patient stated that around 10 p.m. last night she started having nausea and vomiting with yellow emesis.  Denies blood or bile.    The patient states that she has chronic IBS and diarrhea.  Denies any fevers.  Notes that she has left hip and leg pain in relation to a surgery she had almost 2 months ago, and is on antibiotics for that currently still..  Patient denies any history of biliary stones or family history of biliary or gallbladder disease.    No notes on file                 .REVIEW OF SYSTEMS.   Review of Systems    All other ROS other than above negative           . MEDICAL HISTORY.   PCP: Alphonse Boothe III, MD  Specialists:    Past Medical History    Past Medical History:   Diagnosis Date    Anemia     Arthritis     Bleeding ulcer 2016    Chronic pain     DDD (degenerative disc disease), cervical     DDD (degenerative disc disease), lumbar     Depression      Disc degeneration, lumbosacral     Diverticulitis     Encounter for blood transfusion     Hypertension     IBS (irritable bowel syndrome)     Neuropathy of both feet     Seizures     none  since 2017    Umbilical hernia 2020       Past Surgical History     has a past surgical history that includes Spine surgery (2006); Spine surgery; Tonsillectomy; Spinal cord stimulator implant (09/18/2013); Back surgery; Breast surgery (Right); Intramedullary rodding of trochanter of femur (Left, 12/11/2018); Hysterectomy; Eye surgery; Endoscopic ultrasound of upper gastrointestinal tract (N/A, 07/21/2020); Esophagogastroduodenoscopy (N/A, 01/14/2022); Colonoscopy (N/A, 01/14/2022); Caudal epidural steroid injection (N/A, 01/28/2022); Esophagogastroduodenoscopy (N/A, 06/10/2022); Breast biopsy; Repair of epigastric hernia (N/A, 12/7/2023); arthroplasty, hip, girdlestone, posterior approach (Left, 1/19/2024); Irrigation and debridement of lower extremity (Left, 1/19/2024); arthroplasty, hip, girdlestone, posterior approach (Left, 1/25/2024); arthroplasty, hip, girdlestone, posterior approach (Left, 2/15/2024); Irrigation and debridement of lower extremity (Left, 2/15/2024); and flap graft, local (Left, 2/15/2024).  Past Surgical History:   Procedure Laterality Date    ARTHROPLASTY, HIP, GIRDLESTONE, POSTERIOR APPROACH Left 1/19/2024    Procedure: ARTHROPLASTY, HIP, GIRDLESTONE, POSTERIOR APPROACH;  Surgeon: Bulmaro Tellez MD;  Location: [facility] OR [address] [place] [address];  Service: Orthopedics;  Laterality: Left;    ARTHROPLASTY, HIP, GIRDLESTONE, POSTERIOR APPROACH Left 1/25/2024    Procedure: Irrigation and debridement left hip,;  Surgeon: Juanito Painting MD;  Location: [facility] OR [address] [place] [address];  Service: Orthopedics;  Laterality: Left;    ARTHROPLASTY, HIP, GIRDLESTONE, POSTERIOR APPROACH Left 2/15/2024    Procedure: Revision hip antibiotic spacer head exchange, left, lateral, peg board, depuy osteomed in room, vanc, ancef, txa,;  Surgeon:  Bulmaro Tellez MD;  Location: [facility] OR 2ND FLR;  Service: Orthopedics;  Laterality: Left;    BACK SURGERY      BREAST BIOPSY      BREAST SURGERY Right     lumpectomy    CAUDAL EPIDURAL STEROID INJECTION N/A 01/28/2022    Procedure: Injection-steroid-epidural-caudal;  Surgeon: Luzmaria Marcelino MD;  Location: [facility] OR;  Service: Pain Management;  Laterality: N/A;    COLONOSCOPY N/A 01/14/2022    Procedure: COLONOSCOPY;  Surgeon: Abby Landers MD;  Location: [facility] ENDO;  Service: Endoscopy;  Laterality: N/A;    ENDOSCOPIC ULTRASOUND OF UPPER GASTROINTESTINAL TRACT N/A 07/21/2020    Procedure: ULTRASOUND, UPPER GI TRACT, ENDOSCOPIC;  Surgeon: Marcio Nguyễn III, MD;  Location: [facility] ENDO;  Service: Endoscopy;  Laterality: N/A;    ESOPHAGOGASTRODUODENOSCOPY N/A 01/14/2022    Procedure: EGD (ESOPHAGOGASTRODUODENOSCOPY);  Surgeon: Abby Landers MD;  Location: [facility] ENDO;  Service: Endoscopy;  Laterality: N/A;    ESOPHAGOGASTRODUODENOSCOPY N/A 06/10/2022    Procedure: EGD (ESOPHAGOGASTRODUODENOSCOPY);  Surgeon: Abby Landers MD;  Location: [facility] ENDO;  Service: Endoscopy;  Laterality: N/A;    EYE SURGERY      cataract    FLAP GRAFT, LOCAL Left 2/15/2024    Procedure: FLAP GRAFT, LOCAL;  Surgeon: Bulmaro Tellez MD;  [facility];  Service: Orthopedics;  Laterality: Left;    HYSTERECTOMY      INTRAMEDULLARY RODDING OF TROCHANTER OF FEMUR Left 12/11/2018    Procedure: INSERTION, INTRAMEDULLARY SANTOS, FEMUR, TROCHANTER;  Surgeon: Eulalio De La Cruz MD;  Location: [facility] OR;  Service: Orthopedics;  Laterality: Left;    IRRIGATION AND DEBRIDEMENT OF LOWER EXTREMITY Left 1/19/2024    Procedure: IRRIGATION AND DEBRIDEMENT, LOWER EXTREMITY;  Surgeon: Bulmaro Tellez MD;  Location: [facility] OR 2ND FLR;  Service: Orthopedics;  Laterality: Left;    IRRIGATION AND DEBRIDEMENT OF LOWER EXTREMITY Left 2/15/2024    Procedure: IRRIGATION AND DEBRIDEMENT, LOWER EXTREMITY;  Surgeon: Bulmaro Tellez MD;   Location: [address];  Service: Orthopedics;  Laterality: Left;    REPAIR OF EPIGASTRIC HERNIA N/A 12/7/2023    Procedure: REPAIR, HERNIA, EPIGASTRIC;  Surgeon: Vipul Escobar MD;  Location: [facility];  Service: General;  Laterality: N/A;    SPINAL CORD STIMULATOR IMPLANT  09/18/2013    and removal    SPINE SURGERY  2006    lumbar L2-S1 decompression.    SPINE SURGERY      cervical decompression    TONSILLECTOMY           Social  History     reports that she has never smoked. She has never used smokeless tobacco. She reports that she does not drink alcohol and does not use drugs.  Tobacco Use    Smoking status: Never    Smokeless tobacco: Never   Substance and Sexual Activity    Alcohol use: No    Drug use: No    Sexual activity: Not Currently         Family History    family history includes COPD in her brother; Coronary artery disease in her father; Depression in her father; Diabetes in her brother, father, mother, and sister; Heart disease in her father; Hypertension in her father and mother; Irritable bowel syndrome in her mother.     Family History       Problem Relation (Age of Onset)    COPD Brother    Coronary artery disease Father    Depression Father    Diabetes Mother, Father, Sister, Brother    Heart disease Father    Hypertension Mother, Father    Irritable bowel syndrome Mother                       . ALLERGIES.     Review of patient's allergies indicates:  No Known Allergies           . MEDICATIONS.   Home Medications:       No current facility-administered medications on file prior to encounter.     Current Outpatient Medications on File Prior to Encounter   Medication Sig    acetaminophen (TYLENOL) 325 MG tablet Take 2 tablets (650 mg total) by mouth every 8 (eight) hours as needed for Pain (pain scale 1-4).    amlodipine-benazepril 5-20 mg (LOTREL) 5-20 mg per capsule Take 1 capsule by mouth once daily.    calcium carbonate (TUMS) 200 mg calcium (500 mg) chewable tablet Chew and swallow 2  tablets (1,000 mg total) by mouth once daily.    cyclobenzaprine (FLEXERIL) 10 MG tablet Take 1 tablet (10 mg total) by mouth 3 (three) times daily as needed for Muscle spasms.    docusate sodium (COLACE) 100 MG capsule Take 1 capsule (100 mg total) by mouth 2 (two) times daily.    ergocalciferol (ERGOCALCIFEROL) 50,000 unit Cap Take 1 capsule (50,000 Units total) by mouth every 7 days.    EScitalopram oxalate (LEXAPRO) 20 MG tablet Take 1 tablet (20 mg total) by mouth every evening.    levETIRAcetam (KEPPRA) 500 MG Tab Take 1 tablet (500 mg total) by mouth 2 (two) times daily.    nystatin (MYCOSTATIN) powder Apply to affected area 3 times daily (Patient taking differently: Apply 1 g topically 3 (three) times daily. Apply to affected area 3 times daily)    omeprazole (PRILOSEC) 40 MG capsule TAKE 1 CAPSULE(40 MG) BY MOUTH EVERY MORNING    OPW TEST CLAIM - DO NOT FILL OPW test claim. Do not fill.    oxyCODONE-acetaminophen (PERCOCET)  mg per tablet Take 1 tablet by mouth every 4 (four) hours as needed for Pain.    polyethylene glycol (GLYCOLAX) 17 gram/dose powder Use to cap to measure 17 grams, mix with liquid, and take by mouth once daily.    pregabalin (LYRICA) 200 MG Cap Take 1 capsule (200 mg total) by mouth 3 (three) times daily.    sodium chloride 0.9% SolP 50 mL with DAPTOmycin 500 mg SolR 676 mg Inject 676 mg into the vein Daily. End date 3/28/24    [DISCONTINUED] pantoprazole (PROTONIX) 40 MG tablet Take 1 tablet (40 mg total) by mouth 2 (two) times daily.                  . PHYSICAL EXAM.         Vital Signs (24h Range): Vital Signs (Most Recent):   Temp:  [98.1 °F (36.7 °C)] 98.1 °F (36.7 °C)  Pulse:  [66-84] 66  Resp:  [15-20] 20  SpO2:  [98 %-100 %] 98 %  BP: (156-210)/(74-96) 159/77 Temp: 98.1 °F (36.7 °C) (09/07/24 2348)  Pulse: 66 (09/08/24 0530)  Resp: 20 (09/08/24 0530)  BP: (!) 159/77 (09/08/24 0530)  SpO2: 98 % (09/08/24 0530)      Vitals:    09/08/24 0430 09/08/24 0500 09/08/24 0518  09/08/24 0530   BP: (!) 173/78 (!) 175/83  (!) 159/77   Pulse: 69 74  66   Resp: 20 18 16 20   Temp:       TempSrc:       SpO2: 100% 99%  98%   Weight:       Height:               Weight: 59 kg (130 lb)  Body mass index is 20.98 kg/m².  Wt Readings from Last 3 Encounters:   09/07/24 59 kg (130 lb)   08/07/24 60.3 kg (133 lb)   03/20/24 59 kg (130 lb)         --    Physical Exam  Vitals reviewed.   Constitutional:       General: She is not in acute distress.     Appearance: She is not ill-appearing.   HENT:      Head: Normocephalic and atraumatic.   Eyes:      Pupils: Pupils are equal, round, and reactive to light.   Cardiovascular:      Rate and Rhythm: Normal rate.      Pulses: Normal pulses.      Heart sounds: No murmur heard.  Pulmonary:      Effort: Pulmonary effort is normal. No respiratory distress.      Breath sounds: No wheezing, rhonchi or rales.   Abdominal:      General: There is no distension.      Palpations: Abdomen is soft.      Tenderness: There is abdominal tenderness.   Musculoskeletal:         General: Tenderness (Tenderness to palpation with no significant erythema or fluctuance or swelling along the left leg lateral side.) present. No swelling.   Neurological:      General: No focal deficit present.      Mental Status: She is alert and oriented to person, place, and time.      Cranial Nerves: No cranial nerve deficit.      Motor: No weakness.                     DIAGNOSTIC DATA   Summary    Laboratory Studies:    CBC BMP   Recent Labs   Lab 09/07/24  2356   WBC 4.78   HGB 10.4*   HCT 33.2*       Recent Labs   Lab 09/07/24  2356      K 3.5      CO2 22*   BUN 14   CREATININE 0.7   CALCIUM 9.0           All pertinent labs within the past 24 hours have been reviewed.  .    Other Studies:          ------  Diagnostic Data Details          CBC  4.78 10.4 383    33.2     Coag                 BMP  137 105 14 124   3.5 22 0.7     CaMgPhos  9.0              Last labs from current  "encounter as of 09/08/2024-6:07 AM             Laboratory Studies:    Blood Gas:  No results for input(s): "PH", "PCO2", "PO2", "HCO3", "POCSATURATED", "BE" in the last 72 hours.      Recent Labs   Lab 09/07/24  2356   WBC 4.78   HGB 10.4*   HCT 33.2*      MONO 7.1  0.3   EOSINOPHIL 0.6     No results for input(s): "APTT", "INR", "PTT" in the last 168 hours.     Recent Labs   Lab 09/07/24  2356      K 3.5      CO2 22*   BUN 14   CREATININE 0.7   CALCIUM 9.0   PROT 6.3   BILITOT 0.6   ALKPHOS 85   ALT 13   AST 18     No results for input(s): "MG" in the last 168 hours.     No results for input(s): "TROPONINIHS", "CKTOTAL", "CPK", "TROPONINI", "TROPONINT", "TROPTSTAT", "CPKMB", "MB" in the last 168 hours.          Lab Results   Component Value Date    COLORU Yellow 08/06/2024    COLORU Yellow 02/14/2024    COLORU Yellow 01/26/2024    SPECGRAV 1.025 08/06/2024    SPECGRAV 1.015 02/14/2024    SPECGRAV 1.020 01/26/2024    PHUR 7.0 08/06/2024    PHUR 6.0 02/14/2024    PHUR 5.0 01/26/2024    NITRITE Negative 08/06/2024    NITRITE Negative 02/14/2024    NITRITE Negative 01/26/2024    KETONESU Negative 08/06/2024    KETONESU Negative 02/14/2024    KETONESU Negative 01/26/2024    UROBILINOGEN Negative 08/06/2024    UROBILINOGEN 2.0-3.0 (A) 01/16/2024    UROBILINOGEN Negative 12/06/2023      No results for input(s): "TSH", "T3FREE", "FREET4" in the last 168 hours.      Lab Results   Component Value Date    HGBA1C 4.8 02/14/2024    HGBA1C 5.2 12/01/2022     The 10-year ASCVD risk score (Renate MONTERO, et al., 2019) is: 33.1%    Values used to calculate the score:      Age: 78 years      Sex: Female      Is Non- : No      Diabetic: No      Tobacco smoker: No      Systolic Blood Pressure: 159 mmHg      Is BP treated: Yes      HDL Cholesterol: 56 mg/dL      Total Cholesterol: 137 mg/dL           ECG:        RADIOLOGY STUDIES:     US Abdomen Limited  Narrative: EXAMINATION:  US ABDOMEN " LIMITED    CLINICAL HISTORY:  ruq;    TECHNIQUE:  Limited ultrasound of the right upper quadrant of the abdomen (including pancreas, liver, gallbladder, common bile duct, and right kidney) was performed.    COMPARISON:  CT 09/08/2024    FINDINGS:  Please note this is a technically difficult exam due to bowel gas artifact and artifact relating to patient body habitus.    Pancreas: Obscured by bowel gas artifact.    IVC: Unremarkable in its visualized extent, partially obscured by bowel gas artifact.    Liver: Normal in size, measuring 13.5 cm. Homogeneous echotexture. No focal hepatic lesions.    Gallbladder: Non mild bowel echogenic focus measuring up to 1.2 cm adjacent to the gallbladder wall which could reflect large stone or polyp.  The gallbladder wall is at the upper limit of normal thickness measuring 3 mm.  Sonographic Aiken sign is reported to be positive by the ultrasound technologist.    Biliary system: The common duct measures 5 mm.    Right kidney: Measures 8.5 cm without evidence of hydronephrosis..    Miscellaneous: No upper abdominal ascites.    This report was flagged in Epic as abnormal.  Impression: 1.2 cm echogenic nonmobile focus in the gallbladder lumen suggestive of cholelithiasis or possibly a polyp.  Sonographic Aiken sign is reported to be positive by the ultrasound technologist.  This constellation of findings can be seen with acute cholecystitis in the appropriate clinical setting and clinical correlation advised with further assessment as warranted.    Electronically signed by: Lachelle Modi MD  Date:    09/08/2024  Time:    03:53  X-Ray Chest AP Portable  Narrative: EXAMINATION:  XR CHEST AP PORTABLE    CLINICAL HISTORY:  Epigastric pain    TECHNIQUE:  Single frontal view of the chest was performed.    COMPARISON:  08/06/2024    FINDINGS:  Cardiac monitoring leads overlie the chest.  The cardiomediastinal silhouette is unchanged in size and configuration allowing for differences in  positioning/technique.  The lungs are symmetrically expanded with diffuse coarse/increased interstitial attenuation.  No evidence of confluent airspace consolidation, substantial volume of pleural fluid or pneumothorax.  Osseous structures intact with degenerative change.  Impression: No acute intrathoracic abnormality identified on this single radiographic view of the chest.    Electronically signed by: Lachelle Modi MD  Date:    09/08/2024  Time:    02:33  CT Abdomen Pelvis With IV Contrast NO Oral Contrast  Narrative: EXAMINATION:  CT ABDOMEN PELVIS WITH IV CONTRAST    CLINICAL HISTORY:  Abdominal abscess/infection suspected;    TECHNIQUE:  Low dose axial images, sagittal and coronal reformations were obtained from the lung bases to the pubic symphysis following the IV administration of 100 mL of Omnipaque 350 .  No oral contrast.    COMPARISON:  CT pelvis 02/14/2024, CT abdomen and pelvis 01/16/2024    FINDINGS:  The visualized lung bases demonstrate dependent bibasilar atelectasis.  No pleural fluid or focal consolidation.  Left lower lobe calcified pulmonary granuloma.  The visualized portions of the heart and pericardium demonstrate calcification of the mitral apparatus.    The liver is normal in size with subcentimeter hepatic hypodensities too small to definitively characterize but similar to prior study.  The portal vein and splenic vein appear patent.  The gallbladder is distended.  Possible 7 mm calculus in the gallbladder lumen noting the gallbladder is distended.  There is intra and extrahepatic biliary ductal dilatation, slightly increased in extent from prior exam.  The pancreas is atrophic without definite peripancreatic inflammatory change or fluid.  The spleen is unremarkable.  There is nonspecific nodular adrenal gland thickening.    The kidneys enhance symmetrically without evidence of hydronephrosis.  There is subcentimeter renal cortical hypodensities too small to definitively characterize.   There is a punctate nonobstructing calculus in the inferior pole of the right kidney.  The urinary bladder is distended noting definitive assessment of intrapelvic structures is limited due to artifact from left hip arthroplasty.    The abdominal aorta is nonaneurysmal with scattered atherosclerosis.  No retroperitoneal fluid/hemorrhage.    There is a small hiatal hernia present.  There is wall thickening of the gastric antrum/pylorus which can be seen with gastritis.  Clinical correlation is advised.  The visualized loops of small bowel demonstrate no evidence of obstruction.  Moderate volume of fecal material in the colon.  There is wall thickening of the descending and sigmoid colon and questionable inflammatory change which could relate to colitis of infectious, inflammatory or ischemic etiology.  The appendix is within normal limits.  No free intraperitoneal air, portal venous gas or ascites.  There is marked underlying levo scoliosis of the lumbar spine with associated degenerative change.    Postoperative change of left hip arthroplasty.  Remote fractures of the right pubic body and left inferior and superior pubic rami.  Impression: 1. Abnormal wall thickening of the gastric antrum and pylorus which can be seen with gastritis.  Clinical correlation advised.  2. Wall thickening and questionable inflammatory change about the sigmoid and descending colon which can be seen with colitis of infectious, inflammatory or ischemic etiologies.  Clinical correlation advised.  3. Possible 7 mm calculus in the gallbladder lumen which is distended.  Continued intra and extrahepatic biliary ductal dilatation, slightly increased in extent from prior exam.  4. Postoperative change of left hip arthroplasty remote pelvic fractures.  5. Multiple additional findings as above.    Electronically signed by: Lachelle Modi MD  Date:    09/08/2024  Time:    01:52          CARDIAC DIAGNOSTIC INFORMATION REVIEW:  Most Recent  Echocardiogram:  Results for orders placed during the hospital encounter of 01/17/24    Echo    Interpretation Summary    Left Ventricle: The left ventricle is normal in size. Normal wall thickness. There is concentric remodeling. Normal wall motion. There is low normal systolic function with a visually estimated ejection fraction of 50 - 55%. There is normal diastolic function.    Right Ventricle: Normal right ventricular cavity size. Wall thickness is normal. Right ventricle wall motion  is normal. Systolic function is normal.    Aortic Valve: There is moderate aortic valve sclerosis. There is moderate annular calcification present.    Mitral Valve: There is severe mitral annular calcification present.    Aorta: Aortic root is normal in size measuring 3.37 cm. Ascending aorta is normal measuring 3.51 cm.    Pulmonary Artery: The estimated pulmonary artery systolic pressure is 25 mmHg.    IVC/SVC: Normal venous pressure at 3 mmHg.      Last Cardiac Cath  No results found for this or any previous visit.        Last Stress Test   No results found for this or any previous visit.          -------                     . ASSESSMENT & PLAN.   Patient Summary:       Patient Problem List:  There are no hospital problems to display for this patient.        Current Medications:       9/7/2024   Medications   piperacillin-tazobactam 4.5 g in dextrose 5 % 100 mL IVPB (ready to mix) (has no administration in time range)   sodium chloride 0.9% flush 2 mL (has no administration in time range)   melatonin tablet 6 mg (has no administration in time range)   ondansetron disintegrating tablet 8 mg (has no administration in time range)   ondansetron injection 4 mg (has no administration in time range)   polyethylene glycol packet 17 g (has no administration in time range)   senna-docusate 8.6-50 mg per tablet 1 tablet (has no administration in time range)   acetaminophen tablet 650 mg (has no administration in time range)    aluminum-magnesium hydroxide-simethicone 200-200-20 mg/5 mL suspension 30 mL (has no administration in time range)   acetaminophen tablet 650 mg (has no administration in time range)   oxyCODONE immediate release tablet 5 mg (has no administration in time range)   morphine injection 2 mg (has no administration in time range)   naloxone 0.4 mg/mL injection 0.02 mg (has no administration in time range)   potassium bicarbonate disintegrating tablet 50 mEq (has no administration in time range)   potassium bicarbonate disintegrating tablet 35 mEq (has no administration in time range)   potassium bicarbonate disintegrating tablet 60 mEq (has no administration in time range)   magnesium oxide tablet 800 mg (has no administration in time range)   magnesium oxide tablet 800 mg (has no administration in time range)   potassium, sodium phosphates 280-160-250 mg packet 2 packet (has no administration in time range)   potassium, sodium phosphates 280-160-250 mg packet 2 packet (has no administration in time range)   potassium, sodium phosphates 280-160-250 mg packet 2 packet (has no administration in time range)   glucose chewable tablet 16 g (has no administration in time range)   glucose chewable tablet 24 g (has no administration in time range)   dextrose 50% injection 12.5 g (has no administration in time range)   dextrose 50% injection 25 g (has no administration in time range)   glucagon (human recombinant) injection 1 mg (has no administration in time range)   enoxaparin injection 40 mg (has no administration in time range)   morphine injection 2 mg (2 mg Intravenous Given 9/8/24 0007)   prochlorperazine injection Soln 5 mg (5 mg Intravenous Given 9/7/24 9348)   lactated ringers bolus 500 mL (0 mLs Intravenous Stopped 9/8/24 0113)   famotidine (PF) injection 20 mg (20 mg Intravenous Given 9/8/24 0002)   iohexoL (OMNIPAQUE 350) injection 100 mL (100 mLs Intravenous Given 9/8/24 0102)   piperacillin-tazobactam 4.5 g in  "dextrose 5 % 100 mL IVPB (ready to mix) (0 g Intravenous Stopped 9/8/24 0528)   morphine injection 2 mg (2 mg Intravenous Given 9/8/24 0518)   pregabalin capsule 200 mg (200 mg Oral Given 9/8/24 0604)               Assessment & Plan:    No notes have been filed under this hospital service.  Service: Hospital Medicine         Cholecystitis Patient has cholecystitis, we will need to discuss with surgery on removal of the gallbladder.  There is also the unlikely possibility that patient may need stone extraction 1st.  We will continue to monitor her symptoms.  Continue IV antibiotics.    Anemia Patient has mild anemia, normal MCV does have history gastritis    Seizures Continue patient's home medications             EDITABLEPROBLIST     SUBJOBJ    VTE PPX:    VTE Risk Mitigation (From admission, onward)           Ordered     enoxaparin injection 40 mg  Daily         09/08/24 0607     IP VTE HIGH RISK PATIENT  Once         09/08/24 0607     Place sequential compression device  Until discontinued         09/08/24 0607                  Fluids:    Nutrition:   Code Status:   Code Status: Full Code   Admit Status: OP- Observation    Admitting MD:  Yogesh Hoang MD -  Department of Hospital Medicine  - [facility]  ED MD:   Consultants:   Treatment Teams This Admission:  PCP: Alphonse Boothe III, MD    PATHWAYS ORDERED      [] COPD       [] DKA      [] CHF      [] CVA Ischemic      [] GI Bleed      [] NSTEMI      [] NONE                 Electronically Signed By: Yogesh Hoang 9/8/2024 6:07 AM  September 8, 2024     Portions of this chart may have been created with Dragon Voice Recognition Software . Occasional wrong-word or "sound-alike" substitutions may have occurred due to the inherent limitations of voice recognition software. Please read the chart carefully and recognize, using context, where these substitutions have occurred.               "

## 2024-09-08 NOTE — PROGRESS NOTES
[facility]  Progress Note    Patient Name: Froilan Ray  MRN: [id number]  Patient Class: OP- Observation   Admission Date: 9/7/2024  Length of Stay: 0 days  Attending Physician: Jodi Lara MD  Primary Care Provider: Alphonse Boothe III, MD        Subjective:     Principal Problem:Disease of biliary tract        HPI:  No notes on file    Overview/Hospital Course:  Pleasant 78-year-old lady with history of seizure disorder and hypertension presents to the emergency department with complaints of 1 week abdominal pain, nausea and vomiting.  She was hypertensive but otherwise vitals stable.  Additionally, she did not have leukocytosis or elevated total bilirubin, however, patient's imaging was consistent with acute cholecystitis.  As such, General surgery was consulted from the emergency department and they agreed to take her to the OR on 09/08.  Additionally, she was placed on IV Zosyn upon presentation.  Patient doing well after surgery.  Patient may reasonably be discharged on 09/09.    Interval History:  Patient is seen postprocedure this afternoon.  She is resting comfortably enjoying a meal.  She states that she does still have some postoperative abdominal discomfort but overall feels quite well.  She denies any nausea or vomiting.  She denies any subjective fever or chills.  She is in good spirits.    Review of Systems   Constitutional:  Negative for activity change and appetite change.   Respiratory:  Negative for apnea.    Cardiovascular:  Negative for chest pain and leg swelling.   Gastrointestinal:  Positive for abdominal pain. Negative for abdominal distention and vomiting.   Skin:  Negative for color change.     Objective:     Vital Signs (Most Recent):  Temp: 97.7 °F (36.5 °C) (09/08/24 1333)  Pulse: 72 (09/08/24 1533)  Resp: 16 (09/08/24 1533)  BP: (!) 171/82 (09/08/24 1333)  SpO2: 97 % (09/08/24 1533) Vital Signs (24h Range):  Temp:  [97.1 °F (36.2 °C)-98.1  °F (36.7 °C)] 97.7 °F (36.5 °C)  Pulse:  [60-84] 72  Resp:  [12-20] 16  SpO2:  [97 %-100 %] 97 %  BP: (156-220)/(74-96) 171/82     Weight: (P) 57.4 kg (126 lb 8.7 oz)  Body mass index is 20.42 kg/m² (pended).    Intake/Output Summary (Last 24 hours) at 9/8/2024 1644  Last data filed at 9/8/2024 1119  Gross per 24 hour   Intake 1300 ml   Output --   Net 1300 ml         Physical Exam  Vitals reviewed.   HENT:      Head: Normocephalic and atraumatic.      Nose: No rhinorrhea.   Eyes:      Extraocular Movements: Extraocular movements intact.   Cardiovascular:      Rate and Rhythm: Normal rate.      Pulses: Normal pulses.   Pulmonary:      Effort: Pulmonary effort is normal.   Abdominal:      General: Abdomen is flat. Bowel sounds are normal. There is no distension.   Musculoskeletal:      Right lower leg: No edema.      Left lower leg: No edema.   Skin:     General: Skin is warm and dry.   Neurological:      Mental Status: She is alert.   Psychiatric:         Mood and Affect: Mood normal.             Significant Labs: All pertinent labs within the past 24 hours have been reviewed.  CBC:   Recent Labs   Lab 09/07/24  2356   WBC 4.78   HGB 10.4*   HCT 33.2*        CMP:   Recent Labs   Lab 09/07/24  2356      K 3.5      CO2 22*   *   BUN 14   CREATININE 0.7   CALCIUM 9.0   PROT 6.3   ALBUMIN 3.6   BILITOT 0.6   ALKPHOS 85   AST 18   ALT 13   ANIONGAP 10       Significant Imaging: I have reviewed all pertinent imaging results/findings within the past 24 hours.  I have reviewed and interpreted all pertinent imaging results/findings within the past 24 hours.    Assessment/Plan:      * Disease of biliary tract  No concern for choledocholithiasis or ascending cholangitis on presentation, imaging clinical findings consistent with acute cholecystitis, neurosurgery consulted she is now status post laparoscopic cholecystectomy  defer to Dr. Valenzuela who will be taking over the case tomorrow, 9/9 is to the  discontinuation of antibiotics versus a short postoperative course  Follow up with General surgery as needed in the outpatient setting      Seizure disorder  Continue home antiepileptics      Essential hypertension  Patient hypertensive, likely aspect of pain  Patient's home antihypertensive amlodipine-benazepril not available on formulary  We will give amlodipine 5 mg q.day, patient may resume her home antihypertensives on discharge      VTE Risk Mitigation (From admission, onward)           Ordered     enoxaparin injection 40 mg  Daily         09/08/24 0607     IP VTE HIGH RISK PATIENT  Once         09/08/24 0607     Place sequential compression device  Until discontinued         09/08/24 0607                    Discharge Planning   BAILEY:      Code Status: Full Code   Is the patient medically ready for discharge?:     Reason for patient still in hospital (select all that apply): Patient trending condition  Discharge Plan A: Home with family                  Jodi Lara MD  Department of Hospital Medicine   [facility]

## 2024-09-08 NOTE — ANESTHESIA POSTPROCEDURE EVALUATION
Anesthesia Post Evaluation    Patient: Froilan Ray    Procedure(s) Performed: Procedure(s) (LRB):  CHOLECYSTECTOMY, LAPAROSCOPIC (N/A)    Final Anesthesia Type: general      Patient location during evaluation: PACU  Patient participation: Yes- Able to Participate  Level of consciousness: awake and alert and oriented  Post-procedure vital signs: reviewed and stable  Pain management: adequate  Airway patency: patent    PONV status at discharge: No PONV  Anesthetic complications: no      Cardiovascular status: blood pressure returned to baseline, hemodynamically stable and stable  Respiratory status: unassisted, spontaneous ventilation and room air  Hydration status: euvolemic  Follow-up not needed.              Vitals Value Taken Time   /84 09/08/24 1315   Temp 36.7 °C (98 °F) 09/08/24 1245   Pulse 77 09/08/24 1320   Resp 14 09/08/24 1320   SpO2 97 % 09/08/24 1319   Vitals shown include unfiled device data.      Event Time   Out of Recovery 09/08/2024 13:20:13         Pain/Durate Score: Pain Rating Prior to Med Admin: 8 (9/8/2024 12:30 PM)  Duarte Score: 9 (9/8/2024  1:00 PM)

## 2024-09-08 NOTE — HOSPITAL COURSE
Pleasant 78-year-old lady with history of seizure disorder and hypertension presents to the emergency department with complaints of 1 week abdominal pain, nausea and vomiting.  She was hypertensive but otherwise vitals stable.  Additionally, she did not have leukocytosis or elevated total bilirubin, however, patient's imaging was consistent with acute cholecystitis.  As such, General surgery was consulted from the emergency department and they agreed to take her to the OR on 09/08.  Additionally, she was placed on IV Zosyn upon presentation.  Patient doing well after surgery.  Patient may reasonably be discharged on 09/09.

## 2024-09-09 ENCOUNTER — TELEPHONE (OUTPATIENT)
Dept: SURGERY | Facility: CLINIC | Age: 79
End: 2024-09-09
Payer: MEDICAID

## 2024-09-09 VITALS
BODY MASS INDEX: 20.34 KG/M2 | DIASTOLIC BLOOD PRESSURE: 56 MMHG | OXYGEN SATURATION: 99 % | HEIGHT: 66 IN | HEART RATE: 88 BPM | SYSTOLIC BLOOD PRESSURE: 115 MMHG | WEIGHT: 126.56 LBS | TEMPERATURE: 97 F | RESPIRATION RATE: 18 BRPM

## 2024-09-09 LAB
BASOPHILS # BLD AUTO: 0.03 K/UL (ref 0–0.2)
BASOPHILS NFR BLD: 0.3 % (ref 0–1.9)
DIFFERENTIAL METHOD BLD: ABNORMAL
EOSINOPHIL # BLD AUTO: 0 K/UL (ref 0–0.5)
EOSINOPHIL NFR BLD: 0.4 % (ref 0–8)
ERYTHROCYTE [DISTWIDTH] IN BLOOD BY AUTOMATED COUNT: 15.4 % (ref 11.5–14.5)
HCT VFR BLD AUTO: 29.4 % (ref 37–48.5)
HGB BLD-MCNC: 9.2 G/DL (ref 12–16)
IMM GRANULOCYTES # BLD AUTO: 0.03 K/UL (ref 0–0.04)
IMM GRANULOCYTES NFR BLD AUTO: 0.3 % (ref 0–0.5)
LYMPHOCYTES # BLD AUTO: 1.4 K/UL (ref 1–4.8)
LYMPHOCYTES NFR BLD: 15.5 % (ref 18–48)
MCH RBC QN AUTO: 27.1 PG (ref 27–31)
MCHC RBC AUTO-ENTMCNC: 31.3 G/DL (ref 32–36)
MCV RBC AUTO: 87 FL (ref 82–98)
MONOCYTES # BLD AUTO: 0.6 K/UL (ref 0.3–1)
MONOCYTES NFR BLD: 6.3 % (ref 4–15)
NEUTROPHILS # BLD AUTO: 7 K/UL (ref 1.8–7.7)
NEUTROPHILS NFR BLD: 77.2 % (ref 38–73)
NRBC BLD-RTO: 0 /100 WBC
PLATELET # BLD AUTO: 349 K/UL (ref 150–450)
PMV BLD AUTO: 9.8 FL (ref 9.2–12.9)
RBC # BLD AUTO: 3.39 M/UL (ref 4–5.4)
WBC # BLD AUTO: 9.12 K/UL (ref 3.9–12.7)

## 2024-09-09 PROCEDURE — 63600175 PHARM REV CODE 636 W HCPCS: Performed by: SURGERY

## 2024-09-09 PROCEDURE — 85025 COMPLETE CBC W/AUTO DIFF WBC: CPT | Performed by: SURGERY

## 2024-09-09 PROCEDURE — 96366 THER/PROPH/DIAG IV INF ADDON: CPT

## 2024-09-09 PROCEDURE — G0378 HOSPITAL OBSERVATION PER HR: HCPCS

## 2024-09-09 PROCEDURE — 36415 COLL VENOUS BLD VENIPUNCTURE: CPT | Performed by: SURGERY

## 2024-09-09 PROCEDURE — 25000003 PHARM REV CODE 250: Performed by: SURGERY

## 2024-09-09 PROCEDURE — 25000003 PHARM REV CODE 250

## 2024-09-09 RX ORDER — OXYCODONE AND ACETAMINOPHEN 10; 325 MG/1; MG/1
1 TABLET ORAL EVERY 4 HOURS PRN
Qty: 12 TABLET | Refills: 0 | Status: SHIPPED | OUTPATIENT
Start: 2024-09-09

## 2024-09-09 RX ADMIN — PIPERACILLIN SODIUM AND TAZOBACTAM SODIUM 4.5 G: 4; .5 INJECTION, POWDER, LYOPHILIZED, FOR SOLUTION INTRAVENOUS at 04:09

## 2024-09-09 RX ADMIN — PANTOPRAZOLE SODIUM 40 MG: 40 TABLET, DELAYED RELEASE ORAL at 09:09

## 2024-09-09 RX ADMIN — PREGABALIN 200 MG: 75 CAPSULE ORAL at 09:09

## 2024-09-09 RX ADMIN — LEVETIRACETAM 500 MG: 500 TABLET, FILM COATED ORAL at 09:09

## 2024-09-09 RX ADMIN — AMLODIPINE BESYLATE 5 MG: 5 TABLET ORAL at 09:09

## 2024-09-09 NOTE — PLAN OF CARE
Patient cleared for discharge by case management to home, no needs once seen by attending.       09/09/24 0858   Final Note   Assessment Type Final Discharge Note   Anticipated Discharge Disposition Home   Hospital Resources/Appts/Education Provided Appointments scheduled and added to AVS

## 2024-09-09 NOTE — PROGRESS NOTES
POD 1 s/p lap ashlie  Patient doing well.  Pain well controlled.  No nausea or vomiting.  Plans are to DC today.    Wt Readings from Last 3 Encounters:   09/08/24 57.4 kg (126 lb 8.7 oz)   08/07/24 60.3 kg (133 lb)   03/20/24 59 kg (130 lb)     Temp Readings from Last 3 Encounters:   09/09/24 97.4 °F (36.3 °C) (Oral)   08/08/24 98.3 °F (36.8 °C) (Oral)   04/17/24 97.8 °F (36.6 °C) (Oral)     BP Readings from Last 3 Encounters:   09/09/24 (!) 115/56   08/08/24 136/66   04/17/24 97/61     Pulse Readings from Last 3 Encounters:   09/09/24 88   08/08/24 78   04/17/24 88     AAOx3  Soft/nd/nt  No resp distress    Lab Results   Component Value Date    WBC 9.12 09/09/2024    HGB 9.2 (L) 09/09/2024    HCT 29.4 (L) 09/09/2024    MCV 87 09/09/2024     09/09/2024       BMP  Lab Results   Component Value Date     09/07/2024    K 3.5 09/07/2024     09/07/2024    CO2 22 (L) 09/07/2024    BUN 14 09/07/2024    CREATININE 0.7 09/07/2024    CALCIUM 9.0 09/07/2024    ANIONGAP 10 09/07/2024    EGFRNORACEVR >60.0 09/07/2024     A/P: s/p lap ashlie  Ok to dc  RTC in 2 weeks

## 2024-09-09 NOTE — DISCHARGE SUMMARY
[facility] Medicine  Discharge Summary      Patient Name: Froilan Ray  MRN: [id number]  [facility]: [id number]  Patient Class: OP- Observation  Admission Date: 9/7/2024  Hospital Length of Stay: 0 days  Discharge Date and Time:  09/09/2024 8:55 AM  Attending Physician: Isaac Valenzuela MD   Discharging Provider: Isaac Valenzuela MD  Primary Care Provider: Alphonse Boothe III, MD    Primary Care Team: Networked reference to record PCT     HPI:   Froilan Ray is a 78 y.o. female who has a history of  seizures, anemia, peptic ulcer disease, IBS, hypertension, diverticulitis,  and presents with abdominal pain     About a week ago, the patient started having epigastric abdominal pain somewhat radiating to the right severe and constant.Patient stated that around 10 p.m. last night she started having nausea and vomiting with yellow emesis.  Denies blood or bile.    The patient states that she has chronic IBS and diarrhea.  Denies any fevers.  Notes that she has left hip and leg pain in relation to a surgery she had almost 2 months ago, and is on antibiotics for that currently still..  Patient denies any history of biliary stones or family history of biliary or gallbladder disease.    Procedure(s) (LRB):  CHOLECYSTECTOMY, LAPAROSCOPIC (N/A)      Hospital Course:   Pleasant 78-year-old lady with history of seizure disorder and hypertension presents to the emergency department with complaints of 1 week abdominal pain, nausea and vomiting.  She was hypertensive but otherwise vitals stable.  Additionally, she did not have leukocytosis or elevated total bilirubin, however, patient's imaging was consistent with acute cholecystitis.  As such, General surgery was consulted from the emergency department patient successfully underwent laparoscopic cholecystectomy performed by Dr. Ambrosio.  During hospital stay patient received intravenous Zosyn antibiotic therapy.  Postoperatively patient reports adequate pain  control and tolerating diet.  Patient has been cleared for discharge by General surgery and will continue close follow-up with primary care physician and general surgeon upon discharge.  Discharge plan of care reviewed with patient's  at bedside.      Goals of Care Treatment Preferences:  Code Status: Full Code          What is most important right now is to focus on symptom/pain control.  Accordingly, we have decided that the best plan to meet the patient's goals includes continuing with treatment.      SDOH Screening:  The patient was screened for utility difficulties, food insecurity, transport difficulties, housing insecurity, and interpersonal safety and there were no concerns identified this admission.     Consults:   Consults (From admission, onward)          Status Ordering Provider     Inpatient consult to General surgery  Once        Provider:  Cipriano Ambrosio MD    Completed RAÚL CASTREJON          Imaging Results               US Abdomen Limited (Final result)  Result time 09/08/24 03:53:49      Final result by Lachelle Modi MD (09/08/24 03:53:49)                   Impression:      1.2 cm echogenic nonmobile focus in the gallbladder lumen suggestive of cholelithiasis or possibly a polyp.  Sonographic Aiken sign is reported to be positive by the ultrasound technologist.  This constellation of findings can be seen with acute cholecystitis in the appropriate clinical setting and clinical correlation advised with further assessment as warranted.      Electronically signed by: Lachelle Modi MD  Date:    09/08/2024  Time:    03:53               Narrative:    EXAMINATION:  US ABDOMEN LIMITED    CLINICAL HISTORY:  ruq;    TECHNIQUE:  Limited ultrasound of the right upper quadrant of the abdomen (including pancreas, liver, gallbladder, common bile duct, and right kidney) was performed.    COMPARISON:  CT 09/08/2024    FINDINGS:  Please note this is a technically difficult exam due to bowel gas  artifact and artifact relating to patient body habitus.    Pancreas: Obscured by bowel gas artifact.    IVC: Unremarkable in its visualized extent, partially obscured by bowel gas artifact.    Liver: Normal in size, measuring 13.5 cm. Homogeneous echotexture. No focal hepatic lesions.    Gallbladder: Non mild bowel echogenic focus measuring up to 1.2 cm adjacent to the gallbladder wall which could reflect large stone or polyp.  The gallbladder wall is at the upper limit of normal thickness measuring 3 mm.  Sonographic Aiken sign is reported to be positive by the ultrasound technologist.    Biliary system: The common duct measures 5 mm.    Right kidney: Measures 8.5 cm without evidence of hydronephrosis..    Miscellaneous: No upper abdominal ascites.    This report was flagged in Epic as abnormal.                                       X-Ray Chest AP Portable (Final result)  Result time 09/08/24 02:33:36      Final result by Lachelle Modi MD (09/08/24 02:33:36)                   Impression:      No acute intrathoracic abnormality identified on this single radiographic view of the chest.      Electronically signed by: Lachelle Modi MD  Date:    09/08/2024  Time:    02:33               Narrative:    EXAMINATION:  XR CHEST AP PORTABLE    CLINICAL HISTORY:  Epigastric pain    TECHNIQUE:  Single frontal view of the chest was performed.    COMPARISON:  08/06/2024    FINDINGS:  Cardiac monitoring leads overlie the chest.  The cardiomediastinal silhouette is unchanged in size and configuration allowing for differences in positioning/technique.  The lungs are symmetrically expanded with diffuse coarse/increased interstitial attenuation.  No evidence of confluent airspace consolidation, substantial volume of pleural fluid or pneumothorax.  Osseous structures intact with degenerative change.                                       CT Abdomen Pelvis With IV Contrast NO Oral Contrast (Final result)  Result time 09/08/24 01:52:39       Final result by Lachelle Modi MD (09/08/24 01:52:39)                   Impression:      1. Abnormal wall thickening of the gastric antrum and pylorus which can be seen with gastritis.  Clinical correlation advised.  2. Wall thickening and questionable inflammatory change about the sigmoid and descending colon which can be seen with colitis of infectious, inflammatory or ischemic etiologies.  Clinical correlation advised.  3. Possible 7 mm calculus in the gallbladder lumen which is distended.  Continued intra and extrahepatic biliary ductal dilatation, slightly increased in extent from prior exam.  4. Postoperative change of left hip arthroplasty remote pelvic fractures.  5. Multiple additional findings as above.      Electronically signed by: Lachelle Modi MD  Date:    09/08/2024  Time:    01:52               Narrative:    EXAMINATION:  CT ABDOMEN PELVIS WITH IV CONTRAST    CLINICAL HISTORY:  Abdominal abscess/infection suspected;    TECHNIQUE:  Low dose axial images, sagittal and coronal reformations were obtained from the lung bases to the pubic symphysis following the IV administration of 100 mL of Omnipaque 350 .  No oral contrast.    COMPARISON:  CT pelvis 02/14/2024, CT abdomen and pelvis 01/16/2024    FINDINGS:  The visualized lung bases demonstrate dependent bibasilar atelectasis.  No pleural fluid or focal consolidation.  Left lower lobe calcified pulmonary granuloma.  The visualized portions of the heart and pericardium demonstrate calcification of the mitral apparatus.    The liver is normal in size with subcentimeter hepatic hypodensities too small to definitively characterize but similar to prior study.  The portal vein and splenic vein appear patent.  The gallbladder is distended.  Possible 7 mm calculus in the gallbladder lumen noting the gallbladder is distended.  There is intra and extrahepatic biliary ductal dilatation, slightly increased in extent from prior exam.  The pancreas is atrophic  without definite peripancreatic inflammatory change or fluid.  The spleen is unremarkable.  There is nonspecific nodular adrenal gland thickening.    The kidneys enhance symmetrically without evidence of hydronephrosis.  There is subcentimeter renal cortical hypodensities too small to definitively characterize.  There is a punctate nonobstructing calculus in the inferior pole of the right kidney.  The urinary bladder is distended noting definitive assessment of intrapelvic structures is limited due to artifact from left hip arthroplasty.    The abdominal aorta is nonaneurysmal with scattered atherosclerosis.  No retroperitoneal fluid/hemorrhage.    There is a small hiatal hernia present.  There is wall thickening of the gastric antrum/pylorus which can be seen with gastritis.  Clinical correlation is advised.  The visualized loops of small bowel demonstrate no evidence of obstruction.  Moderate volume of fecal material in the colon.  There is wall thickening of the descending and sigmoid colon and questionable inflammatory change which could relate to colitis of infectious, inflammatory or ischemic etiology.  The appendix is within normal limits.  No free intraperitoneal air, portal venous gas or ascites.  There is marked underlying levo scoliosis of the lumbar spine with associated degenerative change.    Postoperative change of left hip arthroplasty.  Remote fractures of the right pubic body and left inferior and superior pubic rami.                                    Final Active Diagnoses:    Diagnosis Date Noted POA    PRINCIPAL PROBLEM:  Disease of biliary tract [K83.9] 07/21/2020 Yes    Essential hypertension [I10] 12/11/2018 Yes     Chronic    Seizure disorder [G40.909] 12/11/2018 Yes      Problems Resolved During this Admission:       Discharged Condition: good    Disposition: Home or Self Care    Follow Up:   Follow-up Information       Alphonse Boothe III, MD Follow up in 1 week(s).    Specialty: Family  Medicine  Why: office will contact patient to schedule.  Contact information:  [address]  [address]  [place] [address]  [phone]               Cipriano Ambrosio MD Follow up in 2 week(s).    Specialties: General Surgery, Colon and Rectal Surgery, Surgery  Contact information:  [address]  [address]  [place] [address]  [phone]                           Patient Instructions:      Diet Cardiac     Notify your health care provider if you experience any of the following:  temperature >100.4     Notify your health care provider if you experience any of the following:  persistent nausea and vomiting or diarrhea     Notify your health care provider if you experience any of the following:  severe uncontrolled pain     Notify your health care provider if you experience any of the following:  redness, tenderness, or signs of infection (pain, swelling, redness, odor or green/yellow discharge around incision site)     Notify your health care provider if you experience any of the following:  difficulty breathing or increased cough     Notify your health care provider if you experience any of the following:  severe persistent headache     Notify your health care provider if you experience any of the following:  worsening rash     Notify your health care provider if you experience any of the following:  persistent dizziness, light-headedness, or visual disturbances     Notify your health care provider if you experience any of the following:  increased confusion or weakness     Activity as tolerated   Order Comments: Fall precautions       Significant Diagnostic Studies: Labs: CMP   Recent Labs   Lab 09/07/24  2356      K 3.5      CO2 22*   *   BUN 14   CREATININE 0.7   CALCIUM 9.0   PROT 6.3   ALBUMIN 3.6   BILITOT 0.6   ALKPHOS 85   AST 18   ALT 13   ANIONGAP 10   , CBC   Recent Labs   Lab 09/07/24  2356 09/09/24  0342   WBC 4.78 9.12   HGB 10.4* 9.2*   HCT 33.2* 29.4*    349   , and INR    Lab Results   Component Value Date    INR 1.0 08/06/2024    INR 1.0 02/14/2024    INR 1.1 11/30/2022       Pending Diagnostic Studies:       Procedure Component Value Units Date/Time    Specimen to Pathology - Surgery [[id number]] Collected: 09/08/24 1048    Order Status: Sent Lab Status: No result     Specimen: Tissue            Medications:  Reconciled Home Medications:      Medication List        CHANGE how you take these medications      nystatin powder  Commonly known as: MYCOSTATIN  Apply to affected area 3 times daily  What changed:   how much to take  how to take this  when to take this            CONTINUE taking these medications      acetaminophen 325 MG tablet  Commonly known as: TYLENOL  Take 2 tablets (650 mg total) by mouth every 8 (eight) hours as needed for Pain (pain scale 1-4).     amlodipine-benazepril 5-20 mg 5-20 mg per capsule  Commonly known as: LOTREL  Take 1 capsule by mouth once daily.     MAXIMILIANO-GEST ANTACID 200 mg calcium (500 mg) chewable tablet  Generic drug: calcium carbonate  Chew and swallow 2 tablets (1,000 mg total) by mouth once daily.     cyclobenzaprine 10 MG tablet  Commonly known as: FLEXERIL  Take 1 tablet (10 mg total) by mouth 3 (three) times daily as needed for Muscle spasms.     docusate sodium 100 MG capsule  Commonly known as: COLACE  Take 1 capsule (100 mg total) by mouth 2 (two) times daily.     EScitalopram oxalate 20 MG tablet  Commonly known as: LEXAPRO  Take 1 tablet (20 mg total) by mouth every evening.     GAVILAX 17 gram/dose powder  Generic drug: polyethylene glycol  Use to cap to measure 17 grams, mix with liquid, and take by mouth once daily.     levETIRAcetam 500 MG Tab  Commonly known as: KEPPRA  Take 1 tablet (500 mg total) by mouth 2 (two) times daily.     omeprazole 40 MG capsule  Commonly known as: PRILOSEC  TAKE 1 CAPSULE(40 MG) BY MOUTH EVERY MORNING     OPW TEST CLAIM - DO NOT FILL  OPW test claim. Do not fill.     oxyCODONE-acetaminophen  mg  per tablet  Commonly known as: PERCOCET  Take 1 tablet by mouth every 4 (four) hours as needed for Pain.     pregabalin 200 MG Cap  Commonly known as: LYRICA  Take 1 capsule (200 mg total) by mouth 3 (three) times daily.     sodium chloride 0.9% SolP 50 mL with DAPTOmycin 500 mg SolR 676 mg  Inject 676 mg into the vein Daily. End date 3/28/24     VITAMIN D2 1,250 mcg (50,000 unit) capsule  Generic drug: ergocalciferol  Take 1 capsule (50,000 Units total) by mouth every 7 days.              Indwelling Lines/Drains at time of discharge:   Lines/Drains/Airways       Drain  Duration                  Closed/Suction Drain 01/25/24 0100 Tube - 1 Left;Lateral Thigh 16 Fr. 228 days         Closed/Suction Drain 02/15/24 1920 Tube - 1 Left;Lateral Thigh Bulb 19 Fr. 206 days                    Time spent on the discharge of patient: 31 minutes         Isaac Valenzuela MD  Department of Hospital Medicine  [facility]

## 2024-09-09 NOTE — PLAN OF CARE
In basket message sent to patient's pcp and general surgery requesting hospital follow up visits.

## 2024-09-09 NOTE — TELEPHONE ENCOUNTER
----- Message from Liliana Hernandez RN sent at 9/9/2024  8:56 AM CDT -----  Regarding: hospital follow up  Hi, patient is discharging from [facility] today and will need hospital follow up within 2 weeks. Please contact patient to schedule.    Thank you,  Liliana Hernandez RN

## 2024-09-09 NOTE — TELEPHONE ENCOUNTER
LMOR @ 10:40am for patient to call back to schedule her post op appointment with Dr. Ambrosio.  Bashir

## 2024-09-10 NOTE — TELEPHONE ENCOUNTER
I called and spoke to patients  to mohit, 10/1/24 @ 2ule her to see Dr. Ambrosio on Tuesday, 10/1/24 @ 2pm in [place].  Bashir

## 2024-09-12 ENCOUNTER — TELEPHONE (OUTPATIENT)
Dept: FAMILY MEDICINE | Facility: CLINIC | Age: 79
End: 2024-09-12
Payer: MEDICAID

## 2024-09-12 ENCOUNTER — PATIENT OUTREACH (OUTPATIENT)
Dept: FAMILY MEDICINE | Facility: CLINIC | Age: 79
End: 2024-09-12
Payer: MEDICAID

## 2024-09-12 NOTE — TELEPHONE ENCOUNTER
Tried calling to do tcc questions and schedule next avail hosp appt.     No option for vm and no answer

## 2024-09-12 NOTE — TELEPHONE ENCOUNTER
Please try one more attempt to do TCC questions and schedule hosp visit        I tried once (just now) no answer. Have to try 2x    ----- Message from Liliana Hernandez RN sent at 9/9/2024  8:52 AM CDT -----  Regarding: hospital follow up  Hi, patient is discharging from [facility] today and will need a hospital follow up within one week.    Please contact patient to schedule.    Thank you,  Liliana hernandez rn

## 2024-09-16 NOTE — TELEPHONE ENCOUNTER
"Discharge Information     Discharge Date:  09/09/2024          Primary Discharge Diagnosis:  cholecystitis          How patient is feeling since discharge from the hospital?  [place] couldn't reach patient          Medication & Order Review     Discharge Medication Review:    Medication reconciliation performed    If no, state reason why not performed: N/A       Did patient have any difficulty/problems filling prescriptions?             If yes, state reason and steps taken to assist in resolving issue: N/A     Does patient have any questions regarding medications?             If yes, state question and steps taken to answer question:  N/A    Was Home Health and/or any equipment ordered for patient upon discharge?            Home Health Not available   If yes, has home health contacted patient and/or initiated services? N/A   Name of Home Health Agency N/A   Durable Medical Equipment (DME)   (Example: walker, wheelchair, nebulizer)   If yes, has the DME provider contacted patient and delivered equipment? N/A    DME Company N/A     Red Flag Review     Was patient educated on "red flags" or things to watch for?         If yes:  "Red flags" patient was told to watch for:                                                  Other:              Is patient experiencing any red flags today (or any of these symptoms) (List examples of red flags specifically)?       Notes:                      If no:    Educated the patient on 3 "red flags" to watch for:                                                  Other:                  Is patient experiencing any red flags today (or any of these symptoms) (List examples of red flags specifically)?        Notes:        Patient Education & Follow Up               Phone number patient will call if having any questions or problems:        Appointment scheduled?        Follow-up/transition of care appointment, including the date/time and location of your appointment:             Notes:        "     Provided patient with date, time and location of follow-up appointment if they do not have it:        Rescheduled transition of care appointment if the patient has a conflict:    Appointment re-scheduled?          Patient informed of this appointment?      Notes:            3 questions patient would like to ask physician during appointment:

## 2024-09-25 ENCOUNTER — HOSPITAL ENCOUNTER (INPATIENT)
Facility: HOSPITAL | Age: 79
LOS: 2 days | Discharge: HOME | DRG: 392 | End: 2024-09-28
Attending: STUDENT IN AN ORGANIZED HEALTH CARE EDUCATION/TRAINING PROGRAM | Admitting: FAMILY MEDICINE
Payer: MEDICARE

## 2024-09-25 DIAGNOSIS — R07.9 CHEST PAIN: ICD-10-CM

## 2024-09-25 DIAGNOSIS — K56.600 PARTIAL INTESTINAL OBSTRUCTION, UNSPECIFIED CAUSE: ICD-10-CM

## 2024-09-25 DIAGNOSIS — R93.3 ABNORMAL MAGNETIC RESONANCE CHOLANGIOPANCREATOGRAPHY (MRCP): ICD-10-CM

## 2024-09-25 DIAGNOSIS — K83.9 DISEASE OF BILIARY TRACT: ICD-10-CM

## 2024-09-25 DIAGNOSIS — R10.13 EPIGASTRIC ABDOMINAL PAIN: ICD-10-CM

## 2024-09-25 DIAGNOSIS — R10.9 ABDOMINAL PAIN: Primary | ICD-10-CM

## 2024-09-25 DIAGNOSIS — R10.9 ABDOMINAL PAIN, UNSPECIFIED ABDOMINAL LOCATION: ICD-10-CM

## 2024-09-25 LAB
ALBUMIN SERPL BCP-MCNC: 3.6 G/DL (ref 3.5–5.2)
ALP SERPL-CCNC: 177 U/L (ref 55–135)
ALT SERPL W/O P-5'-P-CCNC: 15 U/L (ref 10–44)
ANION GAP SERPL CALC-SCNC: 5 MMOL/L (ref 8–16)
AST SERPL-CCNC: 17 U/L (ref 10–40)
BASOPHILS # BLD AUTO: 0.07 K/UL (ref 0–0.2)
BASOPHILS NFR BLD: 0.9 % (ref 0–1.9)
BILIRUB SERPL-MCNC: 0.5 MG/DL (ref 0.1–1)
BILIRUB UR QL STRIP: NEGATIVE
BUN SERPL-MCNC: 22 MG/DL (ref 8–23)
CALCIUM SERPL-MCNC: 9 MG/DL (ref 8.7–10.5)
CHLORIDE SERPL-SCNC: 103 MMOL/L (ref 95–110)
CLARITY UR: CLEAR
CO2 SERPL-SCNC: 28 MMOL/L (ref 23–29)
COLOR UR: YELLOW
CREAT SERPL-MCNC: 0.7 MG/DL (ref 0.5–1.4)
CRP SERPL-MCNC: 10.8 MG/DL
DIFFERENTIAL METHOD BLD: ABNORMAL
EOSINOPHIL # BLD AUTO: 0.8 K/UL (ref 0–0.5)
EOSINOPHIL NFR BLD: 9.5 % (ref 0–8)
ERYTHROCYTE [DISTWIDTH] IN BLOOD BY AUTOMATED COUNT: 14.4 % (ref 11.5–14.5)
EST. GFR  (NO RACE VARIABLE): >60 ML/MIN/1.73 M^2
GLUCOSE SERPL-MCNC: 118 MG/DL (ref 70–110)
GLUCOSE UR QL STRIP: NEGATIVE
HCT VFR BLD AUTO: 28.4 % (ref 37–48.5)
HGB BLD-MCNC: 8.6 G/DL (ref 12–16)
HGB UR QL STRIP: NEGATIVE
IMM GRANULOCYTES # BLD AUTO: 0.02 K/UL (ref 0–0.04)
IMM GRANULOCYTES NFR BLD AUTO: 0.2 % (ref 0–0.5)
KETONES UR QL STRIP: NEGATIVE
LEUKOCYTE ESTERASE UR QL STRIP: NEGATIVE
LIPASE SERPL-CCNC: 5 U/L (ref 4–60)
LYMPHOCYTES # BLD AUTO: 1.2 K/UL (ref 1–4.8)
LYMPHOCYTES NFR BLD: 14 % (ref 18–48)
MCH RBC QN AUTO: 27.4 PG (ref 27–31)
MCHC RBC AUTO-ENTMCNC: 30.3 G/DL (ref 32–36)
MCV RBC AUTO: 90 FL (ref 82–98)
MONOCYTES # BLD AUTO: 0.5 K/UL (ref 0.3–1)
MONOCYTES NFR BLD: 6.3 % (ref 4–15)
NEUTROPHILS # BLD AUTO: 5.7 K/UL (ref 1.8–7.7)
NEUTROPHILS NFR BLD: 69.1 % (ref 38–73)
NITRITE UR QL STRIP: NEGATIVE
NRBC BLD-RTO: 0 /100 WBC
PH UR STRIP: 7 [PH] (ref 5–8)
PLATELET # BLD AUTO: 405 K/UL (ref 150–450)
PMV BLD AUTO: 9.5 FL (ref 9.2–12.9)
POTASSIUM SERPL-SCNC: 4.6 MMOL/L (ref 3.5–5.1)
PROT SERPL-MCNC: 6.1 G/DL (ref 6–8.4)
PROT UR QL STRIP: NEGATIVE
RBC # BLD AUTO: 3.14 M/UL (ref 4–5.4)
SODIUM SERPL-SCNC: 136 MMOL/L (ref 136–145)
SP GR UR STRIP: 1.01 (ref 1–1.03)
TROPONIN I SERPL HS-MCNC: 6.2 PG/ML (ref 0–14.9)
URN SPEC COLLECT METH UR: NORMAL
UROBILINOGEN UR STRIP-ACNC: NEGATIVE EU/DL
WBC # BLD AUTO: 8.21 K/UL (ref 3.9–12.7)

## 2024-09-25 PROCEDURE — 96361 HYDRATE IV INFUSION ADD-ON: CPT

## 2024-09-25 PROCEDURE — 83690 ASSAY OF LIPASE: CPT | Performed by: STUDENT IN AN ORGANIZED HEALTH CARE EDUCATION/TRAINING PROGRAM

## 2024-09-25 PROCEDURE — G0378 HOSPITAL OBSERVATION PER HR: HCPCS

## 2024-09-25 PROCEDURE — 84484 ASSAY OF TROPONIN QUANT: CPT | Performed by: STUDENT IN AN ORGANIZED HEALTH CARE EDUCATION/TRAINING PROGRAM

## 2024-09-25 PROCEDURE — 25000003 PHARM REV CODE 250: Performed by: STUDENT IN AN ORGANIZED HEALTH CARE EDUCATION/TRAINING PROGRAM

## 2024-09-25 PROCEDURE — 25000003 PHARM REV CODE 250: Performed by: HOSPITALIST

## 2024-09-25 PROCEDURE — 96366 THER/PROPH/DIAG IV INF ADDON: CPT

## 2024-09-25 PROCEDURE — 86140 C-REACTIVE PROTEIN: CPT | Performed by: HOSPITALIST

## 2024-09-25 PROCEDURE — 25500020 PHARM REV CODE 255: Performed by: STUDENT IN AN ORGANIZED HEALTH CARE EDUCATION/TRAINING PROGRAM

## 2024-09-25 PROCEDURE — 85025 COMPLETE CBC W/AUTO DIFF WBC: CPT | Performed by: STUDENT IN AN ORGANIZED HEALTH CARE EDUCATION/TRAINING PROGRAM

## 2024-09-25 PROCEDURE — S5010 5% DEXTROSE AND 0.45% SALINE: HCPCS | Performed by: HOSPITALIST

## 2024-09-25 PROCEDURE — 63600175 PHARM REV CODE 636 W HCPCS: Performed by: STUDENT IN AN ORGANIZED HEALTH CARE EDUCATION/TRAINING PROGRAM

## 2024-09-25 PROCEDURE — 99285 EMERGENCY DEPT VISIT HI MDM: CPT | Mod: 25

## 2024-09-25 PROCEDURE — 96365 THER/PROPH/DIAG IV INF INIT: CPT

## 2024-09-25 PROCEDURE — 99223 1ST HOSP IP/OBS HIGH 75: CPT | Mod: 24,,, | Performed by: SURGERY

## 2024-09-25 PROCEDURE — 63600175 PHARM REV CODE 636 W HCPCS: Performed by: HOSPITALIST

## 2024-09-25 PROCEDURE — 81003 URINALYSIS AUTO W/O SCOPE: CPT | Performed by: STUDENT IN AN ORGANIZED HEALTH CARE EDUCATION/TRAINING PROGRAM

## 2024-09-25 PROCEDURE — 36415 COLL VENOUS BLD VENIPUNCTURE: CPT | Performed by: HOSPITALIST

## 2024-09-25 PROCEDURE — 96376 TX/PRO/DX INJ SAME DRUG ADON: CPT

## 2024-09-25 PROCEDURE — 25000003 PHARM REV CODE 250: Performed by: INTERNAL MEDICINE

## 2024-09-25 PROCEDURE — 80053 COMPREHEN METABOLIC PANEL: CPT | Performed by: STUDENT IN AN ORGANIZED HEALTH CARE EDUCATION/TRAINING PROGRAM

## 2024-09-25 PROCEDURE — 96375 TX/PRO/DX INJ NEW DRUG ADDON: CPT

## 2024-09-25 PROCEDURE — 25500020 PHARM REV CODE 255: Performed by: HOSPITALIST

## 2024-09-25 RX ORDER — ALUMINUM HYDROXIDE, MAGNESIUM HYDROXIDE, AND SIMETHICONE 1200; 120; 1200 MG/30ML; MG/30ML; MG/30ML
30 SUSPENSION ORAL 4 TIMES DAILY PRN
Status: DISCONTINUED | OUTPATIENT
Start: 2024-09-25 | End: 2024-09-28 | Stop reason: HOSPADM

## 2024-09-25 RX ORDER — LEVETIRACETAM 500 MG/1
500 TABLET ORAL 2 TIMES DAILY
Status: DISCONTINUED | OUTPATIENT
Start: 2024-09-25 | End: 2024-09-28 | Stop reason: HOSPADM

## 2024-09-25 RX ORDER — NALOXONE HCL 0.4 MG/ML
0.02 VIAL (ML) INJECTION
Status: DISCONTINUED | OUTPATIENT
Start: 2024-09-25 | End: 2024-09-28 | Stop reason: HOSPADM

## 2024-09-25 RX ORDER — LANOLIN ALCOHOL/MO/W.PET/CERES
800 CREAM (GRAM) TOPICAL
Status: DISCONTINUED | OUTPATIENT
Start: 2024-09-25 | End: 2024-09-28 | Stop reason: HOSPADM

## 2024-09-25 RX ORDER — TALC
6 POWDER (GRAM) TOPICAL NIGHTLY PRN
Status: DISCONTINUED | OUTPATIENT
Start: 2024-09-25 | End: 2024-09-28 | Stop reason: HOSPADM

## 2024-09-25 RX ORDER — DEXTROSE MONOHYDRATE AND SODIUM CHLORIDE 5; .45 G/100ML; G/100ML
INJECTION, SOLUTION INTRAVENOUS CONTINUOUS
Status: DISCONTINUED | OUTPATIENT
Start: 2024-09-25 | End: 2024-09-26

## 2024-09-25 RX ORDER — IBUPROFEN 200 MG
16 TABLET ORAL
Status: DISCONTINUED | OUTPATIENT
Start: 2024-09-25 | End: 2024-09-28 | Stop reason: HOSPADM

## 2024-09-25 RX ORDER — SODIUM,POTASSIUM PHOSPHATES 280-250MG
2 POWDER IN PACKET (EA) ORAL
Status: DISCONTINUED | OUTPATIENT
Start: 2024-09-25 | End: 2024-09-28 | Stop reason: HOSPADM

## 2024-09-25 RX ORDER — IBUPROFEN 200 MG
24 TABLET ORAL
Status: DISCONTINUED | OUTPATIENT
Start: 2024-09-25 | End: 2024-09-28 | Stop reason: HOSPADM

## 2024-09-25 RX ORDER — DOCUSATE SODIUM 100 MG/1
100 CAPSULE, LIQUID FILLED ORAL 2 TIMES DAILY
Status: DISCONTINUED | OUTPATIENT
Start: 2024-09-25 | End: 2024-09-28 | Stop reason: HOSPADM

## 2024-09-25 RX ORDER — LEVETIRACETAM 500 MG/5ML
500 INJECTION, SOLUTION, CONCENTRATE INTRAVENOUS
Status: COMPLETED | OUTPATIENT
Start: 2024-09-25 | End: 2024-09-25

## 2024-09-25 RX ORDER — FAMOTIDINE 10 MG/ML
20 INJECTION INTRAVENOUS
Status: COMPLETED | OUTPATIENT
Start: 2024-09-25 | End: 2024-09-25

## 2024-09-25 RX ORDER — ONDANSETRON HYDROCHLORIDE 2 MG/ML
4 INJECTION, SOLUTION INTRAVENOUS
Status: COMPLETED | OUTPATIENT
Start: 2024-09-25 | End: 2024-09-25

## 2024-09-25 RX ORDER — ACETAMINOPHEN 325 MG/1
650 TABLET ORAL EVERY 4 HOURS PRN
Status: DISCONTINUED | OUTPATIENT
Start: 2024-09-25 | End: 2024-09-28 | Stop reason: HOSPADM

## 2024-09-25 RX ORDER — GLUCAGON 1 MG
1 KIT INJECTION
Status: DISCONTINUED | OUTPATIENT
Start: 2024-09-25 | End: 2024-09-28 | Stop reason: HOSPADM

## 2024-09-25 RX ORDER — DICYCLOMINE HYDROCHLORIDE 10 MG/1
10 CAPSULE ORAL 4 TIMES DAILY
Status: DISCONTINUED | OUTPATIENT
Start: 2024-09-25 | End: 2024-09-28 | Stop reason: HOSPADM

## 2024-09-25 RX ORDER — ONDANSETRON HYDROCHLORIDE 2 MG/ML
4 INJECTION, SOLUTION INTRAVENOUS EVERY 6 HOURS PRN
Status: DISCONTINUED | OUTPATIENT
Start: 2024-09-25 | End: 2024-09-28 | Stop reason: HOSPADM

## 2024-09-25 RX ORDER — DOXYCYCLINE 100 MG/1
100 CAPSULE ORAL EVERY 12 HOURS
COMMUNITY

## 2024-09-25 RX ORDER — MORPHINE SULFATE 2 MG/ML
2 INJECTION, SOLUTION INTRAMUSCULAR; INTRAVENOUS EVERY 4 HOURS PRN
Status: DISCONTINUED | OUTPATIENT
Start: 2024-09-25 | End: 2024-09-26

## 2024-09-25 RX ORDER — ESCITALOPRAM OXALATE 10 MG/1
20 TABLET ORAL NIGHTLY
Status: DISCONTINUED | OUTPATIENT
Start: 2024-09-25 | End: 2024-09-28 | Stop reason: HOSPADM

## 2024-09-25 RX ORDER — ACETAMINOPHEN 325 MG/1
650 TABLET ORAL EVERY 8 HOURS PRN
Status: DISCONTINUED | OUTPATIENT
Start: 2024-09-25 | End: 2024-09-25

## 2024-09-25 RX ORDER — MORPHINE SULFATE 2 MG/ML
2 INJECTION, SOLUTION INTRAMUSCULAR; INTRAVENOUS
Status: COMPLETED | OUTPATIENT
Start: 2024-09-25 | End: 2024-09-25

## 2024-09-25 RX ADMIN — IOHEXOL 100 ML: 350 INJECTION, SOLUTION INTRAVENOUS at 04:09

## 2024-09-25 RX ADMIN — ONDANSETRON 4 MG: 2 INJECTION INTRAMUSCULAR; INTRAVENOUS at 03:09

## 2024-09-25 RX ADMIN — DOCUSATE SODIUM 100 MG: 100 CAPSULE, LIQUID FILLED ORAL at 09:09

## 2024-09-25 RX ADMIN — PIPERACILLIN SODIUM AND TAZOBACTAM SODIUM 3.38 G: 3; .375 INJECTION, POWDER, LYOPHILIZED, FOR SOLUTION INTRAVENOUS at 02:09

## 2024-09-25 RX ADMIN — PIPERACILLIN SODIUM AND TAZOBACTAM SODIUM 3.38 G: 3; .375 INJECTION, POWDER, LYOPHILIZED, FOR SOLUTION INTRAVENOUS at 09:09

## 2024-09-25 RX ADMIN — LEVETIRACETAM 500 MG: 100 INJECTION, SOLUTION INTRAVENOUS at 06:09

## 2024-09-25 RX ADMIN — MORPHINE SULFATE 2 MG: 2 INJECTION, SOLUTION INTRAMUSCULAR; INTRAVENOUS at 02:09

## 2024-09-25 RX ADMIN — IOHEXOL 100 ML: 350 INJECTION, SOLUTION INTRAVENOUS at 06:09

## 2024-09-25 RX ADMIN — MORPHINE SULFATE 2 MG: 2 INJECTION, SOLUTION INTRAMUSCULAR; INTRAVENOUS at 07:09

## 2024-09-25 RX ADMIN — MORPHINE SULFATE 2 MG: 2 INJECTION, SOLUTION INTRAMUSCULAR; INTRAVENOUS at 03:09

## 2024-09-25 RX ADMIN — DEXTROSE AND SODIUM CHLORIDE: 5; 450 INJECTION, SOLUTION INTRAVENOUS at 09:09

## 2024-09-25 RX ADMIN — ESCITALOPRAM OXALATE 20 MG: 10 TABLET ORAL at 09:09

## 2024-09-25 RX ADMIN — FAMOTIDINE 20 MG: 10 INJECTION INTRAVENOUS at 03:09

## 2024-09-25 RX ADMIN — PIPERACILLIN SODIUM AND TAZOBACTAM SODIUM 4.5 G: 4; .5 INJECTION, POWDER, LYOPHILIZED, FOR SOLUTION INTRAVENOUS at 05:09

## 2024-09-25 RX ADMIN — DICYCLOMINE HYDROCHLORIDE 10 MG: 10 CAPSULE ORAL at 09:09

## 2024-09-25 RX ADMIN — LEVETIRACETAM 500 MG: 500 TABLET, FILM COATED ORAL at 09:09

## 2024-09-25 NOTE — ASSESSMENT & PLAN NOTE
Chronic, controlled. Latest blood pressure and vitals reviewed-     Temp:  [98.5 °F (36.9 °C)-98.8 °F (37.1 °C)]   Pulse:  [77-92]   Resp:  [10-27]   BP: (104-182)/(55-84)   SpO2:  [93 %-99 %] .   Home meds for hypertension were reviewed and noted below.   Hypertension Medications               amlodipine-benazepril 5-20 mg (LOTREL) 5-20 mg per capsule Take 1 capsule by mouth once daily.            While in the hospital, will manage blood pressure as follows; Continue home antihypertensive regimen    Will utilize p.r.n. blood pressure medication only if patient's blood pressure greater than 160/100 and she develops symptoms such as worsening chest pain or shortness of breath.

## 2024-09-25 NOTE — PLAN OF CARE
Pt was explained MARC. Pt verbalized understanding of MARC and signed. MARC scanned to .    09/25/24 1817   MARC Message   Medicare Outpatient and Observation Notification regarding financial responsibility Given to patient/caregiver;Explained to patient/caregiver;Signed/date by patient/caregiver   Date MARC was signed 09/25/24   Time MARC was signed [id number]

## 2024-09-25 NOTE — ED PROVIDER NOTES
Encounter Date: 9/25/2024       History     Chief Complaint   Patient presents with    Abdominal Pain     Gallbladder surg x3 weeks ago, woke up tonight in pain 10/10. Percocet 10mg at home.     HPI  78-year-old woman with a history of recent cholecystectomy presents for evaluation of right upper and lower quadrant abdominal pain that has been occurring intermittently since her surgery but woke her up tonight and was very severe.  It is described as crampy.  She took Percocet at home with minimal relief.  She denies fever chills cough congestion shortness of breath.  She reports having all over chest pain earlier tonight when she was having the pain.  The chest pain has resolved.  She denies nausea vomiting.  She denies change in bladder habits.  She reports she last pooped yesterday, she reports she has not passed gas today.  Review of patient's allergies indicates:  No Known Allergies  Past Medical History:   Diagnosis Date    Anemia     Arthritis     Bleeding ulcer 07/2016    Chronic pain     DDD (degenerative disc disease), cervical     DDD (degenerative disc disease), lumbar     Depression     Disc degeneration, lumbosacral     Diverticulitis     Encounter for blood transfusion     Hypertension     IBS (irritable bowel syndrome)     Neuropathy of both feet     Seizures     none  since 2017    Umbilical hernia 2020     Past Surgical History:   Procedure Laterality Date    ARTHROPLASTY, HIP, GIRDLESTONE, POSTERIOR APPROACH Left 1/19/2024    Procedure: ARTHROPLASTY, HIP, GIRDLESTONE, POSTERIOR APPROACH;  Surgeon: Bulmaro Tellez MD;  Location: [facility] OR [address] [place] [address];  Service: Orthopedics;  Laterality: Left;    ARTHROPLASTY, HIP, GIRDLESTONE, POSTERIOR APPROACH Left 1/25/2024    Procedure: Irrigation and debridement left hip,;  Surgeon: Juanito Painting MD;  Location: [facility] OR [address] [place] [address];  Service: Orthopedics;  Laterality: Left;    ARTHROPLASTY, HIP, GIRDLESTONE, POSTERIOR APPROACH Left 2/15/2024    Procedure:  Revision hip antibiotic spacer head exchange, left, lateral, peg board, depuy osteomed in room, vanc, ancef, txa,;  Surgeon: Bulmaro Tellez MD;  Location: [facility] OR 2ND FLR;  Service: Orthopedics;  Laterality: Left;    BACK SURGERY      BREAST BIOPSY      BREAST SURGERY Right     lumpectomy    CAUDAL EPIDURAL STEROID INJECTION N/A 01/28/2022    Procedure: Injection-steroid-epidural-caudal;  Surgeon: Luzmaria Marcelino MD;  Location: [facility] OR;  Service: Pain Management;  Laterality: N/A;    COLONOSCOPY N/A 01/14/2022    Procedure: COLONOSCOPY;  Surgeon: Abby Landers MD;  Location: [facility] ENDO;  Service: Endoscopy;  Laterality: N/A;    ENDOSCOPIC ULTRASOUND OF UPPER GASTROINTESTINAL TRACT N/A 07/21/2020    Procedure: ULTRASOUND, UPPER GI TRACT, ENDOSCOPIC;  Surgeon: Marcio Nguyễn III, MD;  Location: [facility] ENDO;  Service: Endoscopy;  Laterality: N/A;    ESOPHAGOGASTRODUODENOSCOPY N/A 01/14/2022    Procedure: EGD (ESOPHAGOGASTRODUODENOSCOPY);  Surgeon: Abby Landres MD;  Location: [facility] ENDO;  Service: Endoscopy;  Laterality: N/A;    ESOPHAGOGASTRODUODENOSCOPY N/A 06/10/2022    Procedure: EGD (ESOPHAGOGASTRODUODENOSCOPY);  Surgeon: Abby Landers MD;  Location: [facility] ENDO;  Service: Endoscopy;  Laterality: N/A;    EYE SURGERY      cataract    FLAP GRAFT, LOCAL Left 2/15/2024    Procedure: FLAP GRAFT, LOCAL;  Surgeon: Bulmaro Tellez MD;  Location: [facility] OR 2ND FLR;  Service: Orthopedics;  Laterality: Left;    HYSTERECTOMY      INTRAMEDULLARY RODDING OF TROCHANTER OF FEMUR Left 12/11/2018    Procedure: INSERTION, INTRAMEDULLARY SANTOS, FEMUR, TROCHANTER;  Surgeon: Eulalio De La Cruz MD;  Location: [facility] OR;  Service: Orthopedics;  Laterality: Left;    IRRIGATION AND DEBRIDEMENT OF LOWER EXTREMITY Left 1/19/2024    Procedure: IRRIGATION AND DEBRIDEMENT, LOWER EXTREMITY;  Surgeon: Bulmaro Tellez MD;  Location: [facility] OR 2ND FLR;  Service: Orthopedics;  Laterality: Left;    IRRIGATION AND DEBRIDEMENT OF LOWER  EXTREMITY Left 2/15/2024    Procedure: IRRIGATION AND DEBRIDEMENT, LOWER EXTREMITY;  Surgeon: Bulmaro Tellez MD;  Location: [facility] OR 2ND FLR;  Service: Orthopedics;  Laterality: Left;    LAPAROSCOPIC CHOLECYSTECTOMY N/A 9/8/2024    Procedure: CHOLECYSTECTOMY, LAPAROSCOPIC;  Surgeon: Cipriano Ambrosio MD;  Location: [facility] OR;  Service: General;  Laterality: N/A;    REPAIR OF EPIGASTRIC HERNIA N/A 12/7/2023    Procedure: REPAIR, HERNIA, EPIGASTRIC;  Surgeon: Vipul Escobar MD;  Location: [facility] OR;  Service: General;  Laterality: N/A;    SPINAL CORD STIMULATOR IMPLANT  09/18/2013    and removal    SPINE SURGERY  2006    lumbar L2-S1 decompression.    SPINE SURGERY      cervical decompression    TONSILLECTOMY       Family History   Problem Relation Name Age of Onset    Diabetes Mother      Hypertension Mother      Irritable bowel syndrome Mother      Diabetes Father          insulin dependent    Hypertension Father      Heart disease Father      Coronary artery disease Father      Depression Father      Diabetes Sister      Diabetes Brother      COPD Brother       Social History     Tobacco Use    Smoking status: Never    Smokeless tobacco: Never   Substance Use Topics    Alcohol use: No    Drug use: No     Review of Systems   All other systems reviewed and are negative.      Physical Exam     Initial Vitals   BP Pulse Resp Temp SpO2   09/25/24 0245 09/25/24 0245 09/25/24 0245 09/25/24 0249 09/25/24 0245   (!) 182/84 89 16 98.8 °F (37.1 °C) 99 %      MAP       --                Physical Exam    Nursing note and vitals reviewed.  Constitutional: She appears well-developed. She is not diaphoretic.   HENT:   Head: Normocephalic and atraumatic.   Eyes: Right eye exhibits no discharge. Left eye exhibits no discharge.   Neck: No tracheal deviation present.   Cardiovascular:  Normal rate, regular rhythm and intact distal pulses.           Pulmonary/Chest: Breath sounds normal. No stridor. No respiratory distress.    Abdominal: Abdomen is soft. There is abdominal tenderness.   Diffuse abdominal tenderness to palpation worse in right upper and right lower quadrant, she has some ecchymosis to her abdomen wall, surgical sites clean dry intact There is no rebound and no guarding.   Musculoskeletal:         General: No tenderness.     Neurological: She is alert and oriented to person, place, and time.   Skin: Skin is warm and dry.         ED Course   Procedures  Labs Reviewed   CBC W/ AUTO DIFFERENTIAL - Abnormal       Result Value    WBC 8.21      RBC 3.14 (*)     Hemoglobin 8.6 (*)     Hematocrit 28.4 (*)     MCV 90      MCH 27.4      MCHC 30.3 (*)     RDW 14.4      Platelets 405      MPV 9.5      Immature Granulocytes 0.2      Gran # (ANC) 5.7      Immature Grans (Abs) 0.02      Lymph # 1.2      Mono # 0.5      Eos # 0.8 (*)     Baso # 0.07      nRBC 0      Gran % 69.1      Lymph % 14.0 (*)     Mono % 6.3      Eosinophil % 9.5 (*)     Basophil % 0.9      Differential Method Automated     COMPREHENSIVE METABOLIC PANEL - Abnormal    Sodium 136      Potassium 4.6      Chloride 103      CO2 28      Glucose 118 (*)     BUN 22      Creatinine 0.7      Calcium 9.0      Total Protein 6.1      Albumin 3.6      Total Bilirubin 0.5      Alkaline Phosphatase 177 (*)     AST 17      ALT 15      eGFR >60.0      Anion Gap 5 (*)    LIPASE    Lipase 5     URINALYSIS, REFLEX TO URINE CULTURE    Specimen UA Urine, Clean Catch      Color, UA Yellow      Appearance, UA Clear      pH, UA 7.0      Specific Gravity, UA 1.015      Protein, UA Negative      Glucose, UA Negative      Ketones, UA Negative      Bilirubin (UA) Negative      Occult Blood UA Negative      Nitrite, UA Negative      Urobilinogen, UA Negative      Leukocytes, UA Negative      Narrative:     Specimen Source->Urine   TROPONIN I HIGH SENSITIVITY    Troponin I High Sensitivity 6.2     POCT LACTATE          Imaging Results               CT Abdomen Pelvis With IV Contrast NO Oral  Contrast (Final result)  Result time 09/25/24 05:14:31      Final result by Bill Valadez MD (09/25/24 05:14:31)                   Impression:      Circumferential wall thickening involving the left colon and rectosigmoid colon as well as a segment of the hepatic flexure of the right colon, correlation for colitis is needed.    There is mild to moderate distention along the transverse colon with moderate to prominent distention of the right colon, with stool, potentially secondary to partial obstructive change associated with the aforementioned findings of suspected colitis.    Multiple dilated small bowel loops in the right lower abdomen, these may potentially relate to the colonic distention with stool however if there is persistent or worsening symptomatology additional follow-up to exclude developing obstruction is recommended.    Mild wall and fold thickening of the stomach, correlation for gastritis is needed.    Interval cholecystectomy, biliary dilatation may relate to passive biliary dilatation of prior cholecystectomy, clinical correlation otherwise needed.    Mild fluid at the gallbladder fossa does not appear to represent a walled off fluid collection and may relate to mild free fluid of the abdomen and pelvis.    Fluid collection associated with the postoperative left hip as discussed above.    Additional findings as above.    This report was flagged in Epic as abnormal.      Electronically signed by: Bill Valadez  Date:    09/25/2024  Time:    05:14               Narrative:    EXAMINATION:  CT ABDOMEN PELVIS WITH IV CONTRAST    CLINICAL HISTORY:  RLQ abdominal pain (Age >= 14y);    TECHNIQUE:  Low dose axial images, sagittal and coronal reformations were obtained from the lung bases to the pubic symphysis following the IV administration of 100 mL of Omnipaque 350 oral contrast was not utilized.  Single phase postcontrast CT examination of the abdomen and pelvis is submitted.    COMPARISON:  CT  examination of the abdomen and pelvis September 8, 2024    FINDINGS:  Artifact is present, this diminishes the sensitivity of the examination.  The visualized lung bases demonstrate mild motion artifact and mild atelectatic change.  There is a small to moderate hiatal hernia noted.  Gastric wall and fold thickening is noted, nonspecific, seen as an interval change, correlation for gastritis is needed.    The gallbladder is surgically absent, appearing to have been resected in the interim since the prior study.  There is mild fluid seen at the gallbladder fossa however without a thick-walled configuration.  This may relate to the appearance of mild free fluid throughout the abdomen and pelvis..  There is biliary dilatation, this may relate to passive biliary dilatation of prior cholecystectomy, clinical and historical correlation is otherwise needed.    There is no evidence for peripancreatic inflammatory change and no evidence for pancreatic ductal dilatation.  There is no additional evidence for acute process of the liver, pancreas, spleen or adrenal glands, adrenal gland thickening is again noted appearing stable.    Nonobstructing nephrolithiasis of the left kidney is noted.  There is no evidence for ureteral calculus or obstructive uropathy or perinephric inflammatory change bilaterally.  The abdominal aorta appears normal in caliber, demonstrates appropriate opacification.  The urinary bladder appears unremarkable for degree of distention.    There is appearance of circumferential wall thickening involving the hepatic flexure and descending colon and rectosigmoid colon, correlation for colitis is needed.  These segments of the colon demonstrate mild air and stool within the lumen however predominantly decompressed.  There is mild-to-moderate distention of the transverse colon with moderate to prominent distention of the right colon with stool.  There is mild circumferential thickening along the hepatic flexure,  correlation for colitis is needed.    There is a structure consistent with the appendix, difficult to delineate in its entirety, primary appendiceal inflammatory process is not thought present.    There are several dilated small bowel loops in the right lower abdomen, it is possible these small bowel loops are dilated due to the distention of the right colon with stool, however if there is persistent or worsening symptomatology additional follow-up to exclude developing obstruction is recommended.  There is no evidence for free intraperitoneal air.  There is no evidence for pneumatosis or portal venous air or mesenteric venous air.    The osseous structures demonstrate chronic change, there is scoliotic curvature of the spine, postoperative change of the spine is noted.  Left hip replacement noted, there is associated artifact.  There is appearance posterior to the left hip of variable attenuation diminished attenuation measuring up to approximately 6.1 x 1.8 cm on axial image 188, this may relate to a fluid collection, this may relate to a seroma or hematoma, however does not appear acute.  Correlation for signs or symptoms of infection is otherwise needed.                                       X-Ray Chest AP Portable (Final result)  Result time 09/25/24 04:24:36      Final result by Bill Valadez MD (09/25/24 04:24:36)                   Impression:      Diminished depth of inspiration and elevation of the right hemidiaphragm, and mild atelectatic change noted, there is no additional radiographic evidence for acute intrathoracic process.      Electronically signed by: Bill Valadez  Date:    09/25/2024  Time:    04:24               Narrative:    EXAMINATION:  XR CHEST AP PORTABLE    CLINICAL HISTORY:  Chest pain, unspecified    TECHNIQUE:  Single frontal view of the chest was performed.    COMPARISON:  Chest radiograph September 8, 2024    FINDINGS:  Single portable chest view is submitted.  There is  diminished depth of inspiration and elevation of the right hemidiaphragm and minimal rotation, when accounting for position and technique and depth of inspiration the appearance of the cardiomediastinal silhouette is stable.    Mild atelectatic change noted.  There is no evidence for superimposed confluent infiltrate or consolidation, significant pleural effusion or pneumothorax.    The osseous structures demonstrate chronic change.                                       Medications   famotidine (PF) injection 20 mg (20 mg Intravenous Given 9/25/24 0322)   ondansetron injection 4 mg (4 mg Intravenous Given 9/25/24 0323)   morphine injection 2 mg (2 mg Intravenous Given 9/25/24 0321)   iohexoL (OMNIPAQUE 350) injection 100 mL (100 mLs Intravenous Given 9/25/24 0401)   piperacillin-tazobactam 4.5 g in dextrose 5 % 100 mL IVPB (ready to mix) (0 g Intravenous Stopped 9/25/24 0608)   levETIRAcetam injection 500 mg (500 mg Intravenous Given 9/25/24 0636)     Medical Decision Making  Right-sided abdominal pain in the setting of cholecystectomy 3 weeks ago, she has been having similar pain intermittently but it was much more severe tonight she also had an episode of chest pain that resolved, hypertensive, otherwise stable vital signs, on exam she has ecchymosis to her abdominal wall, she is diffusely tender to palpation worse in her right lower quadrant, soft abdomen, nontoxic appearing, differential includes postoperative abscess, small bowel obstruction, other intra-abdominal pathology such as appendicitis not excluded, UTI, metabolic or endocrine derangement, ACS, pneumonia.  We will obtain labs, we will obtain a troponin and an EKG with her chest pain, CT scan, treat her symptomatically.    Bryan Mercedes D.O.   Emergency Medicine Attending Physician  7:31 AM  I discussed the case with General surgery Dr. Ambrosio.  Please see ED course, Dr. De La Garza will admit the patient to the hospital.    Amount and/or Complexity of Data  Reviewed  Labs: ordered.  Radiology: ordered and independent interpretation performed. Decision-making details documented in ED Course.  ECG/medicine tests: ordered and independent interpretation performed.     Details: EKG on my independent interpretation normal sinus rhythm proximally 80 beats per minute, left axis, QTC less than 500, no STEMI, when compared to prior from 8 6 24 her axis has shifted, she is no longer tachycardic,    Risk  Prescription drug management.               ED Course as of 09/25/24 0731   Wed Sep 25, 2024   0523 Updated patient on findings and plan to admit to the hospital [IC]   0523 Point of care lactic was negative [IC]   0555 Discussed with hospital medicine for admission, they report they may need to pass it off to the day team, consult general surgery [IC]   0634 Discussed the case with Dr. Ambrosio from general surgery who says that he says it sounds more like colitis and that GI may need to be involved but he will follow along with the patient once she s admitted. [JR]   0729 Dr. De La Garza will admit the patient to the hospital [JR]      ED Course User Index  [IC] Niranjan Mac MD  [JR] Tyron Mercedes DO                           Clinical Impression:  Final diagnoses:  [R07.9] Chest pain  [R10.9] Abdominal pain, unspecified abdominal location  [K56.600] Partial intestinal obstruction, unspecified cause (Primary)  [R10.13] Epigastric abdominal pain          ED Disposition Condition    Observation Stable                Tyron Mercedes DO  09/25/24 0717

## 2024-09-25 NOTE — CONSULTS
GASTROENTEROLOGY INPATIENT CONSULT NOTE  Patient Name: Froilan Ray  Patient MRN: [id number]  Patient : 1945    Admit Date: 2024  Service date: 2024    Reason for Consult: ileus/ colitis    PCP: Alphonse Boothe III, MD    Chief Complaint   Patient presents with    Abdominal Pain     Gallbladder surg x3 weeks ago, woke up tonight in pain 10/10. Percocet 10mg at home.       HPI: Patient is 78-year-old woman with a history of recent cholecystectomy presents for evaluation of right upper and lower quadrant abdominal pain that has been occurring intermittently since her surgery but woke her up tonight and was very severe.  It is described as crampy.  She took Percocet at home with minimal relief.  She denies fever chills cough congestion shortness of breath.  She reports having all over chest pain earlier tonight when she was having the pain.  The chest pain has resolved.  She denies nausea vomiting.  She denies change in bladder habits.  She reports she last pooped yesterday, she reports she has not passed gas today.  Review of patient's allergies indicates:  No Known Allergies     On my evaluation, she is pretty lethargic after receiving IV keppra. She has a chronic hip wound and completed several weeks of IV antibiotics and now on doxycyline daily for indefinite amount of time per . No significant abnormalities on labs except for chronic low H/H. WBC in normal range but she does have a mild eosinophilia count (9.5%). No severe electrolyte abnormalities. She does oxycodone for chronic pain at home and has diagnosis of opioid induced constipation. CT imaging suggest possible early SBO but diffuse colitis findings throughout. General surgery consulted and recommends GI evaluation.   Continued on IV zosyn.      Patient is interviewed with her son.  Her pain is 5/10 today and she is tolerating ice chips and liquids without exacerbation of abdominal pain.  She reports her bowel movements are  difficult because she has a rectocele that she has to manually push back when ever she is having straining    CHART REVIEW:  EGD 6/10/22 dr. Landers- medium amount of food (residue) in the stomach.                          - Non-bleeding gastric ulcer with no stigmata of                          bleeding. Biopsied.                          - Erythematous mucosa in the antrum.     1/14/2022 EGD Benign-appearing esophageal stenosis.                          - Small hiatal hernia.                          - Oozing gastric ulcers with oozing hemorrhage                          ([place] Class Ib). Treated with bipolar cautery.                          - Gastritis. Biopsied.   1/14/2022 colonoscopy Decreased mucosa vascular pattern in the sigmoid                          colon. Biopsied.                          - Diverticulosis in the sigmoid colon.       9/1/2020 colonoscopy dr. Silver- stool through colon interfering with visualization. Severe ischemic colitis on imaging    7/21/2020 EUS no sign of significant pathology in the                         common bile duct. GB stones vs sludge balls in neck                         / body of GB     Past Medical History:  Past Medical History:   Diagnosis Date    Anemia     Arthritis     Bleeding ulcer 07/2016    Chronic pain     DDD (degenerative disc disease), cervical     DDD (degenerative disc disease), lumbar     Depression     Disc degeneration, lumbosacral     Diverticulitis     Encounter for blood transfusion     Hypertension     IBS (irritable bowel syndrome)     Neuropathy of both feet     Seizures     none  since 2017    Umbilical hernia 2020        Past Surgical History:  Past Surgical History:   Procedure Laterality Date    ARTHROPLASTY, HIP, GIRDLESTONE, POSTERIOR APPROACH Left 1/19/2024    Procedure: ARTHROPLASTY, HIP, GIRDLESTONE, POSTERIOR APPROACH;  Surgeon: Bulmaro Tellez MD;  Location: [facility] OR [address] [place] [address];  Service: Orthopedics;  Laterality: Left;     ARTHROPLASTY, HIP, GIRDLESTONE, POSTERIOR APPROACH Left 1/25/2024    Procedure: Irrigation and debridement left hip,;  Surgeon: Juanito Painting MD;  Location: [facility] OR [facility];  Service: Orthopedics;  Laterality: Left;    ARTHROPLASTY, HIP, GIRDLESTONE, POSTERIOR APPROACH Left 2/15/2024    Procedure: Revision hip antibiotic spacer head exchange, left, lateral, peg board, depuy osteomed in room, vanc, ancef, txa,;  Surgeon: Bulmaro Tellez MD;  Location: [facility] OR [facility];  Service: Orthopedics;  Laterality: Left;    BACK SURGERY      BREAST BIOPSY      BREAST SURGERY Right     lumpectomy    CAUDAL EPIDURAL STEROID INJECTION N/A 01/28/2022    Procedure: Injection-steroid-epidural-caudal;  Surgeon: Luzmaria Marcelino MD;  Location: [facility] OR;  Service: Pain Management;  Laterality: N/A;    COLONOSCOPY N/A 01/14/2022    Procedure: COLONOSCOPY;  Surgeon: Abby Landers MD;  Location: [facility];  Service: Endoscopy;  Laterality: N/A;    ENDOSCOPIC ULTRASOUND OF UPPER GASTROINTESTINAL TRACT N/A 07/21/2020    Procedure: ULTRASOUND, UPPER GI TRACT, ENDOSCOPIC;  Surgeon: Marcio Nguyễn III, MD;  Location: [facility];  Service: Endoscopy;  Laterality: N/A;    ESOPHAGOGASTRODUODENOSCOPY N/A 01/14/2022    Procedure: EGD (ESOPHAGOGASTRODUODENOSCOPY);  Surgeon: Abby Landers MD;  Location: [facility] ENDO;  Service: Endoscopy;  Laterality: N/A;    ESOPHAGOGASTRODUODENOSCOPY N/A 06/10/2022    Procedure: EGD (ESOPHAGOGASTRODUODENOSCOPY);  Surgeon: Abby Landers MD;  Location: [facility];  Service: Endoscopy;  Laterality: N/A;    EYE SURGERY      cataract    FLAP GRAFT, LOCAL Left 2/15/2024    Procedure: FLAP GRAFT, LOCAL;  Surgeon: Bulmaro Tellez MD;  [facility];  Service: Orthopedics;  Laterality: Left;    HYSTERECTOMY      INTRAMEDULLARY RODDING OF TROCHANTER OF FEMUR Left 12/11/2018    Procedure: INSERTION, INTRAMEDULLARY SANTOS, FEMUR, TROCHANTER;  Surgeon: Eulalio De La Cruz MD;  Location: [facility]  OR;  Service: Orthopedics;  Laterality: Left;    IRRIGATION AND DEBRIDEMENT OF LOWER EXTREMITY Left 1/19/2024    Procedure: IRRIGATION AND DEBRIDEMENT, LOWER EXTREMITY;  Surgeon: Bulmaro eTllez MD;  Location: [facility] OR 2ND FLR;  Service: Orthopedics;  Laterality: Left;    IRRIGATION AND DEBRIDEMENT OF LOWER EXTREMITY Left 2/15/2024    Procedure: IRRIGATION AND DEBRIDEMENT, LOWER EXTREMITY;  Surgeon: Bulmaro Tellez MD;  Location: [facility] OR 2ND FLR;  Service: Orthopedics;  Laterality: Left;    LAPAROSCOPIC CHOLECYSTECTOMY N/A 9/8/2024    Procedure: CHOLECYSTECTOMY, LAPAROSCOPIC;  Surgeon: Cipriano Ambrosio MD;  Location: [facility] OR;  Service: General;  Laterality: N/A;    REPAIR OF EPIGASTRIC HERNIA N/A 12/7/2023    Procedure: REPAIR, HERNIA, EPIGASTRIC;  Surgeon: Vipul Escobar MD;  Location: [facility] OR;  Service: General;  Laterality: N/A;    SPINAL CORD STIMULATOR IMPLANT  09/18/2013    and removal    SPINE SURGERY  2006    lumbar L2-S1 decompression.    SPINE SURGERY      cervical decompression    TONSILLECTOMY          Home Medications:  (Not in a hospital admission)      Inpatient Medications:  Review of patient's allergies indicates:  No Known Allergies    Social History:   Social History     Occupational History    Not on file   Tobacco Use    Smoking status: Never    Smokeless tobacco: Never   Substance and Sexual Activity    Alcohol use: No    Drug use: No    Sexual activity: Not Currently       Family History:   Family History   Problem Relation Name Age of Onset    Diabetes Mother      Hypertension Mother      Irritable bowel syndrome Mother      Diabetes Father          insulin dependent    Hypertension Father      Heart disease Father      Coronary artery disease Father      Depression Father      Diabetes Sister      Diabetes Brother      COPD Brother         Review of Systems:  GENERAL: No fever, chills, weight loss  SKIN: No rashes, jaundice, pruritus  HEENT: No rhinorrhea, epistaxis, vision  "changes.  No trauma, tinnitus, lymphadenopathy or pharyngitis  CV: No chest pain, palpitations, edima or COOLEY  PULM: No cough or sputum production.  No wheezing.  GI: AS IN HPI  URINARY:  No hematuria, dysuria  MS: No change in muscle or joint pain, weakness, or ROM  Neuro: No focal neurologic changes  PSYCH: No change in mood or personality.  No suicidal ideation.  ENDOCRINE: No fatigue      OBJECTIVE:    Vitals:    09/25/24 0930 09/25/24 1000 09/25/24 1100 09/25/24 1200   BP: (!) 124/56 (!) 110/57 (!) 108/58 124/60   BP Location:       Pulse: 73 78 76 76   Resp: 14 17 13 15   Temp:       TempSrc:       SpO2: 97% 100% 100% 96%   Weight:       Height:         Physical Exam:    GEN: well-developed, well-nourished, awake and alert, non-toxic appearing adult  HEENT: PERRL, sclera anicteric, oral mucosa pink and moist without lesion  NECK: trachea midline; Good ROM  CV: regular rate and rhythm, no murmurs or gallops  RESP: clear to auscultation bilaterally, no wheezes, rhonci or rales  ABD: soft, non-tender, non-distended, normal bowel sounds  EXT: no swelling or edema, 2+ pulses distally  SKIN: no rashes or jaundice  PSYCH: normal affect    Labs:   Recent Labs   Lab 09/25/24  0308   WBC 8.21   HGB 8.6*      MCV 90     No results for input(s): "IRON", "FERRITIN" in the last 168 hours.    Invalid input(s): "IRONSAT"  Recent Labs   Lab 09/25/24  0308      K 4.6   BUN 22   CREATININE 0.7   ALBUMIN 3.6   AST 17   ALT 15   ALKPHOS 177*            Radiology Review:  Imaging Results               CT Abdomen Pelvis With IV Contrast NO Oral Contrast (Final result)  Result time 09/25/24 05:14:31      Final result by Bill Valadez MD (09/25/24 05:14:31)                   Impression:      Circumferential wall thickening involving the left colon and rectosigmoid colon as well as a segment of the hepatic flexure of the right colon, correlation for colitis is needed.    There is mild to moderate distention along the " transverse colon with moderate to prominent distention of the right colon, with stool, potentially secondary to partial obstructive change associated with the aforementioned findings of suspected colitis.    Multiple dilated small bowel loops in the right lower abdomen, these may potentially relate to the colonic distention with stool however if there is persistent or worsening symptomatology additional follow-up to exclude developing obstruction is recommended.    Mild wall and fold thickening of the stomach, correlation for gastritis is needed.    Interval cholecystectomy, biliary dilatation may relate to passive biliary dilatation of prior cholecystectomy, clinical correlation otherwise needed.    Mild fluid at the gallbladder fossa does not appear to represent a walled off fluid collection and may relate to mild free fluid of the abdomen and pelvis.    Fluid collection associated with the postoperative left hip as discussed above.    Additional findings as above.    This report was flagged in Epic as abnormal.      Electronically signed by: Bill Valadez  Date:    09/25/2024  Time:    05:14               Narrative:    EXAMINATION:  CT ABDOMEN PELVIS WITH IV CONTRAST    CLINICAL HISTORY:  RLQ abdominal pain (Age >= 14y);    TECHNIQUE:  Low dose axial images, sagittal and coronal reformations were obtained from the lung bases to the pubic symphysis following the IV administration of 100 mL of Omnipaque 350 oral contrast was not utilized.  Single phase postcontrast CT examination of the abdomen and pelvis is submitted.    COMPARISON:  CT examination of the abdomen and pelvis September 8, 2024    FINDINGS:  Artifact is present, this diminishes the sensitivity of the examination.  The visualized lung bases demonstrate mild motion artifact and mild atelectatic change.  There is a small to moderate hiatal hernia noted.  Gastric wall and fold thickening is noted, nonspecific, seen as an interval change, correlation for  gastritis is needed.    The gallbladder is surgically absent, appearing to have been resected in the interim since the prior study.  There is mild fluid seen at the gallbladder fossa however without a thick-walled configuration.  This may relate to the appearance of mild free fluid throughout the abdomen and pelvis..  There is biliary dilatation, this may relate to passive biliary dilatation of prior cholecystectomy, clinical and historical correlation is otherwise needed.    There is no evidence for peripancreatic inflammatory change and no evidence for pancreatic ductal dilatation.  There is no additional evidence for acute process of the liver, pancreas, spleen or adrenal glands, adrenal gland thickening is again noted appearing stable.    Nonobstructing nephrolithiasis of the left kidney is noted.  There is no evidence for ureteral calculus or obstructive uropathy or perinephric inflammatory change bilaterally.  The abdominal aorta appears normal in caliber, demonstrates appropriate opacification.  The urinary bladder appears unremarkable for degree of distention.    There is appearance of circumferential wall thickening involving the hepatic flexure and descending colon and rectosigmoid colon, correlation for colitis is needed.  These segments of the colon demonstrate mild air and stool within the lumen however predominantly decompressed.  There is mild-to-moderate distention of the transverse colon with moderate to prominent distention of the right colon with stool.  There is mild circumferential thickening along the hepatic flexure, correlation for colitis is needed.    There is a structure consistent with the appendix, difficult to delineate in its entirety, primary appendiceal inflammatory process is not thought present.    There are several dilated small bowel loops in the right lower abdomen, it is possible these small bowel loops are dilated due to the distention of the right colon with stool, however if  there is persistent or worsening symptomatology additional follow-up to exclude developing obstruction is recommended.  There is no evidence for free intraperitoneal air.  There is no evidence for pneumatosis or portal venous air or mesenteric venous air.    The osseous structures demonstrate chronic change, there is scoliotic curvature of the spine, postoperative change of the spine is noted.  Left hip replacement noted, there is associated artifact.  There is appearance posterior to the left hip of variable attenuation diminished attenuation measuring up to approximately 6.1 x 1.8 cm on axial image 188, this may relate to a fluid collection, this may relate to a seroma or hematoma, however does not appear acute.  Correlation for signs or symptoms of infection is otherwise needed.                                       X-Ray Chest AP Portable (Final result)  Result time 09/25/24 04:24:36      Final result by Bill Valadez MD (09/25/24 04:24:36)                   Impression:      Diminished depth of inspiration and elevation of the right hemidiaphragm, and mild atelectatic change noted, there is no additional radiographic evidence for acute intrathoracic process.      Electronically signed by: Bill Valadez  Date:    09/25/2024  Time:    04:24               Narrative:    EXAMINATION:  XR CHEST AP PORTABLE    CLINICAL HISTORY:  Chest pain, unspecified    TECHNIQUE:  Single frontal view of the chest was performed.    COMPARISON:  Chest radiograph September 8, 2024    FINDINGS:  Single portable chest view is submitted.  There is diminished depth of inspiration and elevation of the right hemidiaphragm and minimal rotation, when accounting for position and technique and depth of inspiration the appearance of the cardiomediastinal silhouette is stable.    Mild atelectatic change noted.  There is no evidence for superimposed confluent infiltrate or consolidation, significant pleural effusion or pneumothorax.    The  osseous structures demonstrate chronic change.                                          IMPRESSION / RECOMMENDATIONS:   Active Problem List with Overview Notes    Diagnosis Date Noted    Post-ictal confusion 08/08/2024    Seizure 08/06/2024    Hypertension 08/06/2024    Chronic pain 08/06/2024    Chronic infection of left hip, currently on antibiotics 08/06/2024    PICC (peripherally inserted central catheter) in place 03/05/2024    Hypophosphatemia 02/17/2024    Bacteriuria 02/16/2024     Gram negative rods      Hypocalcemia 02/15/2024    Hypoalbuminemia 02/15/2024    Vitamin D deficiency 02/15/2024    Antibiotic Spacer failure status post Girdlestone procedure 02/14/2024    Urinary retention 01/29/2024    Painful orthopaedic hardware 01/18/2024    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    MRSA bacteremia 01/18/2024    Oral lesion 01/18/2024    Chronic heart failure with preserved ejection fraction 01/16/2024    Strangulated hernia of abdominal wall 12/06/2023    Kyphoscoliosis and scoliosis 10/01/2023    Closed fracture of acetabulum 05/16/2023    Spinal stenosis 06/30/2022    Uses walker 06/30/2022    History of seizures 06/30/2022    Anticoagulated 06/30/2022    S/P ORIF (open reduction internal fixation) fracture 06/30/2022    Osteoporosis 06/30/2022    Depression 06/30/2022    Hypokalemia 06/19/2022    Paroxysmal atrial fibrillation 06/16/2022    Iron deficiency anemia 05/18/2022    Chronic gastric ulcer with bleeding 01/14/2022    Severe anemia 01/13/2022    Disease of biliary tract 07/21/2020    Drug-induced constipation 01/10/2020    Colitis 01/08/2020    Femur fracture, left 12/11/2018    Essential hypertension 12/11/2018    Peripheral neuropathy 12/11/2018    Seizure disorder 12/11/2018    Open wound of left heel 12/11/2018    Uterine mass 10/02/2017    Encounter for postoperative wound check 10/23/2013    Chronic pain with uncomplicated opioid dependence 08/27/2013   79 yo female with acute  colitis and ileus on CT 3 weeks post op cck  Prior h/o ischemic colitis with suspected recurrence postoperatively - rec CTA mesenteric vessels.  Supportive care with IV antibiotics.  Discussed pathophysiology with patient and son and we will pursue outpatient colonoscopy to monitor for healing.  Okay to advance diet.  Dicyclomine as needed for abdominal pain.  Okay to discharge once tolerating diet with pain control  Ileus - serial imaging  Long-term opiate use with history of MRSA of hip requiring long-term doxycycline maintenance  Constipation.  Maintenance MiraLax titration at home  Rectocele clinically per patient.  We will need workup as an outpatient to assess if pelvic floor therapy can assist with minimizing colonic strain       Thank you for this consult.    Saadia Chance  9/25/2024  1:00 PM

## 2024-09-25 NOTE — H&P
[facility] - Emergency Dept  Hospital Medicine  History & Physical    Patient Name: Froilan Ray  MRN: [id number]  Patient Class: OP- Observation  Admission Date: 9/25/2024  Attending Physician: Marie De La Garza MD   Primary Care Provider: Alphonse Boothe III, MD         Patient information was obtained from patient, past medical records, and ER records.     Subjective:     Principal Problem:Colitis    Chief Complaint:   Chief Complaint   Patient presents with    Abdominal Pain     Gallbladder surg x3 weeks ago, woke up tonight in pain 10/10. Percocet 10mg at home.        HPI: ED HPI:  78-year-old woman with a history of recent cholecystectomy presents for evaluation of right upper and lower quadrant abdominal pain that has been occurring intermittently since her surgery but woke her up tonight and was very severe.  It is described as crampy.  She took Percocet at home with minimal relief.  She denies fever chills cough congestion shortness of breath.  She reports having all over chest pain earlier tonight when she was having the pain.  The chest pain has resolved.  She denies nausea vomiting.  She denies change in bladder habits.  She reports she last pooped yesterday, she reports she has not passed gas today.  Review of patient's allergies indicates:  No Known Allergies    On my evaluation, she is pretty lethargic after receiving IV keppra. She has a chronic hip wound and completed several weeks of IV antibiotics and now on doxycyline daily for indefinite amount of time per . No significant abnormalities on labs except for chronic low H/H. WBC in normal range but she does have a mild eosinophilia count (9.5%). No severe electrolyte abnormalities. She does oxycodone for chronic pain at home and has diagnosis of opioid induced constipation. CT imaging suggest possible early SBO but diffuse colitis findings throughout. General surgery consulted and recommends GI evaluation.   Continued  on IV zosyn.     Past Medical History:   Diagnosis Date    Anemia     Arthritis     Bleeding ulcer 07/2016    Chronic pain     DDD (degenerative disc disease), cervical     DDD (degenerative disc disease), lumbar     Depression     Disc degeneration, lumbosacral     Diverticulitis     Encounter for blood transfusion     Hypertension     IBS (irritable bowel syndrome)     Neuropathy of both feet     Seizures     none  since 2017    Umbilical hernia 2020       Past Surgical History:   Procedure Laterality Date    ARTHROPLASTY, HIP, GIRDLESTONE, POSTERIOR APPROACH Left 1/19/2024    Procedure: ARTHROPLASTY, HIP, GIRDLESTONE, POSTERIOR APPROACH;  Surgeon: Bulmaro Tellez MD;  Location: [address];  Service: Orthopedics;  Laterality: Left;    ARTHROPLASTY, HIP, GIRDLESTONE, POSTERIOR APPROACH Left 1/25/2024    Procedure: Irrigation and debridement left hip,;  Surgeon: Juanito Painting MD;  [facility];  Service: Orthopedics;  Laterality: Left;    ARTHROPLASTY, HIP, GIRDLESTONE, POSTERIOR APPROACH Left 2/15/2024    Procedure: Revision hip antibiotic spacer head exchange, left, lateral, peg board, depuy osteomed in room, vanc, ancef, txa,;  Surgeon: Bulmaro Tellez MD;  Location: [facility] OR [address] [place] [address];  Service: Orthopedics;  Laterality: Left;    BACK SURGERY      BREAST BIOPSY      BREAST SURGERY Right     lumpectomy    CAUDAL EPIDURAL STEROID INJECTION N/A 01/28/2022    Procedure: Injection-steroid-epidural-caudal;  Surgeon: Luzmaria Marcelino MD;  Location: [facility] OR;  Service: Pain Management;  Laterality: N/A;    COLONOSCOPY N/A 01/14/2022    Procedure: COLONOSCOPY;  Surgeon: Abby Landers MD;  Location: [facility] ENDO;  Service: Endoscopy;  Laterality: N/A;    ENDOSCOPIC ULTRASOUND OF UPPER GASTROINTESTINAL TRACT N/A 07/21/2020    Procedure: ULTRASOUND, UPPER GI TRACT, ENDOSCOPIC;  Surgeon: Marcio Nguyễn III, MD;  Location: [facility] ENDO;  Service: Endoscopy;  Laterality: N/A;     ESOPHAGOGASTRODUODENOSCOPY N/A 01/14/2022    Procedure: EGD (ESOPHAGOGASTRODUODENOSCOPY);  Surgeon: Abby Landers MD;  Location: [facility] ENDO;  Service: Endoscopy;  Laterality: N/A;    ESOPHAGOGASTRODUODENOSCOPY N/A 06/10/2022    Procedure: EGD (ESOPHAGOGASTRODUODENOSCOPY);  Surgeon: Abby Landers MD;  Location: [facility] ENDO;  Service: Endoscopy;  Laterality: N/A;    EYE SURGERY      cataract    FLAP GRAFT, LOCAL Left 2/15/2024    Procedure: FLAP GRAFT, LOCAL;  Surgeon: Bulmaro Tellez MD;  Location: [facility] OR 2ND FLR;  Service: Orthopedics;  Laterality: Left;    HYSTERECTOMY      INTRAMEDULLARY RODDING OF TROCHANTER OF FEMUR Left 12/11/2018    Procedure: INSERTION, INTRAMEDULLARY SANTOS, FEMUR, TROCHANTER;  Surgeon: Eulalio De La Cruz MD;  Location: [facility] OR;  Service: Orthopedics;  Laterality: Left;    IRRIGATION AND DEBRIDEMENT OF LOWER EXTREMITY Left 1/19/2024    Procedure: IRRIGATION AND DEBRIDEMENT, LOWER EXTREMITY;  Surgeon: Bulmaro Tellez MD;  Location: [facility] OR 2ND FLR;  Service: Orthopedics;  Laterality: Left;    IRRIGATION AND DEBRIDEMENT OF LOWER EXTREMITY Left 2/15/2024    Procedure: IRRIGATION AND DEBRIDEMENT, LOWER EXTREMITY;  Surgeon: Bulmaro Tellez MD;  Location: [facility] OR 2ND FLR;  Service: Orthopedics;  Laterality: Left;    LAPAROSCOPIC CHOLECYSTECTOMY N/A 9/8/2024    Procedure: CHOLECYSTECTOMY, LAPAROSCOPIC;  Surgeon: Cipriano Ambrosio MD;  Location: [facility] OR;  Service: General;  Laterality: N/A;    REPAIR OF EPIGASTRIC HERNIA N/A 12/7/2023    Procedure: REPAIR, HERNIA, EPIGASTRIC;  Surgeon: Vipul Escobar MD;  Location: [facility] OR;  Service: General;  Laterality: N/A;    SPINAL CORD STIMULATOR IMPLANT  09/18/2013    and removal    SPINE SURGERY  2006    lumbar L2-S1 decompression.    SPINE SURGERY      cervical decompression    TONSILLECTOMY         Review of patient's allergies indicates:  No Known Allergies    No current facility-administered medications on file prior to encounter.      Current Outpatient Medications on File Prior to Encounter   Medication Sig    amlodipine-benazepril 5-20 mg (LOTREL) 5-20 mg per capsule Take 1 capsule by mouth once daily.    calcium carbonate (TUMS) 200 mg calcium (500 mg) chewable tablet Chew and swallow 2 tablets (1,000 mg total) by mouth once daily.    cyclobenzaprine (FLEXERIL) 10 MG tablet Take 1 tablet (10 mg total) by mouth 3 (three) times daily as needed for Muscle spasms.    docusate sodium (COLACE) 100 MG capsule Take 1 capsule (100 mg total) by mouth 2 (two) times daily.    doxycycline (VIBRAMYCIN) 100 MG Cap Take 100 mg by mouth every 12 (twelve) hours.    EScitalopram oxalate (LEXAPRO) 20 MG tablet Take 1 tablet (20 mg total) by mouth every evening.    levETIRAcetam (KEPPRA) 500 MG Tab Take 1 tablet (500 mg total) by mouth 2 (two) times daily.    omeprazole (PRILOSEC) 40 MG capsule TAKE 1 CAPSULE(40 MG) BY MOUTH EVERY MORNING    oxyCODONE-acetaminophen (PERCOCET)  mg per tablet Take 1 tablet by mouth every 4 (four) hours as needed for Pain.    pregabalin (LYRICA) 200 MG Cap Take 1 capsule (200 mg total) by mouth 3 (three) times daily.    acetaminophen (TYLENOL) 325 MG tablet Take 2 tablets (650 mg total) by mouth every 8 (eight) hours as needed for Pain (pain scale 1-4).    ergocalciferol (ERGOCALCIFEROL) 50,000 unit Cap Take 1 capsule (50,000 Units total) by mouth every 7 days.    nystatin (MYCOSTATIN) powder Apply to affected area 3 times daily (Patient taking differently: Apply 1 g topically 3 (three) times daily. Apply to affected area 3 times daily)    OPW TEST CLAIM - DO NOT FILL OPW test claim. Do not fill.    polyethylene glycol (GLYCOLAX) 17 gram/dose powder Use to cap to measure 17 grams, mix with liquid, and take by mouth once daily.    sodium chloride 0.9% SolP 50 mL with DAPTOmycin 500 mg SolR 676 mg Inject 676 mg into the vein Daily. End date 3/28/24    [DISCONTINUED] pantoprazole (PROTONIX) 40 MG tablet Take 1 tablet (40 mg  total) by mouth 2 (two) times daily.     Family History       Problem Relation (Age of Onset)    COPD Brother    Coronary artery disease Father    Depression Father    Diabetes Mother, Father, Sister, Brother    Heart disease Father    Hypertension Mother, Father    Irritable bowel syndrome Mother          Tobacco Use    Smoking status: Never    Smokeless tobacco: Never   Substance and Sexual Activity    Alcohol use: No    Drug use: No    Sexual activity: Not Currently     Review of Systems   Constitutional:  Positive for activity change and appetite change.   Respiratory: Negative.     Cardiovascular: Negative.    Gastrointestinal:  Positive for abdominal pain, constipation and nausea. Negative for abdominal distention, blood in stool, diarrhea and vomiting.   Genitourinary: Negative.    Musculoskeletal:  Positive for arthralgias and gait problem.   Skin: Negative.    Neurological:  Positive for weakness.   Hematological:  Bruises/bleeds easily.   Psychiatric/Behavioral:  Positive for confusion.      Objective:     Vital Signs (Most Recent):  Temp: 98.5 °F (36.9 °C) (09/25/24 0745)  Pulse: 77 (09/25/24 0745)  Resp: 12 (09/25/24 0745)  BP: (!) 104/56 (09/25/24 0730)  SpO2: 97 % (09/25/24 0745) Vital Signs (24h Range):  Temp:  [98.5 °F (36.9 °C)-98.8 °F (37.1 °C)] 98.5 °F (36.9 °C)  Pulse:  [77-92] 77  Resp:  [10-27] 12  SpO2:  [93 %-99 %] 97 %  BP: (104-182)/(55-84) 104/56     Weight: 59 kg (130 lb)  Body mass index is 20.98 kg/m².     Physical Exam  Constitutional:       Comments: Lethargic but arousable    HENT:      Head: Normocephalic and atraumatic.      Mouth/Throat:      Mouth: Mucous membranes are dry.      Pharynx: Oropharynx is clear.   Cardiovascular:      Rate and Rhythm: Normal rate and regular rhythm.      Pulses: Normal pulses.   Pulmonary:      Effort: Pulmonary effort is normal. No respiratory distress.      Breath sounds: No wheezing.   Abdominal:      General: Bowel sounds are normal. There is no  distension.      Palpations: Abdomen is soft.      Tenderness: There is abdominal tenderness. There is no guarding.      Comments: Bruising across upper abdominal wall    Musculoskeletal:         General: Signs of injury present. No swelling.      Cervical back: Normal range of motion and neck supple.   Skin:     General: Skin is warm and dry.      Capillary Refill: Capillary refill takes less than 2 seconds.      Coloration: Skin is pale.   Neurological:      General: No focal deficit present.      Cranial Nerves: No cranial nerve deficit.      Motor: Weakness present.                Significant Labs: All pertinent labs within the past 24 hours have been reviewed.  Recent Lab Results         09/25/24  0308        Albumin 3.6              ALT 15       Anion Gap 5       Appearance, UA Clear       AST 17       Baso # 0.07       Basophil % 0.9       Bilirubin (UA) Negative       BILIRUBIN TOTAL 0.5  Comment: For infants and newborns, interpretation of results should be based  on gestational age, weight and in agreement with clinical  observations.    Premature Infant recommended reference ranges:  Up to 24 hours.............<8.0 mg/dL  Up to 48 hours............<12.0 mg/dL  3-5 days..................<15.0 mg/dL  6-29 days.................<15.0 mg/dL         BUN 22       Calcium 9.0       Chloride 103       CO2 28       Color, UA Yellow       Creatinine 0.7       Differential Method Automated       eGFR >60.0       Eos # 0.8       Eos % 9.5       Glucose 118       Glucose, UA Negative       Gran # (ANC) 5.7       Gran % 69.1       Hematocrit 28.4       Hemoglobin 8.6       Immature Grans (Abs) 0.02  Comment: Mild elevation in immature granulocytes is non specific and   can be seen in a variety of conditions including stress response,   acute inflammation, trauma and pregnancy. Correlation with other   laboratory and clinical findings is essential.         Immature Granulocytes 0.2       Ketones, UA Negative        Leukocyte Esterase, UA Negative       Lipase 5       Lymph # 1.2       Lymph % 14.0       MCH 27.4       MCHC 30.3       MCV 90       Mono # 0.5       Mono % 6.3       MPV 9.5       NITRITE UA Negative       nRBC 0       Blood, UA Negative       pH, UA 7.0       Platelet Count 405       Potassium 4.6       PROTEIN TOTAL 6.1       Protein, UA Negative  Comment: Recommend a 24 hour urine protein or a urine   protein/creatinine ratio if globulin induced proteinuria is  clinically suspected.         RBC 3.14       RDW 14.4       Sodium 136       Spec Grav UA 1.015       Specimen UA Urine, Clean Catch       Troponin I High Sensitivity 6.2  Comment: Troponin results differ between methods. Do not use   results between Troponin methods interchangeably.    Alkaline Phospatase levels above 400 U/L may   cause false positive results.    Access hsTnI should not be used for patients taking   Asfotase chelsea (Strensiq).         UROBILINOGEN UA Negative       WBC 8.21               Significant Imaging: I have reviewed all pertinent imaging results/findings within the past 24 hours.    CT abdomen and pelvis as read by radiology  Impression:     Circumferential wall thickening involving the left colon and rectosigmoid colon as well as a segment of the hepatic flexure of the right colon, correlation for colitis is needed.     There is mild to moderate distention along the transverse colon with moderate to prominent distention of the right colon, with stool, potentially secondary to partial obstructive change associated with the aforementioned findings of suspected colitis.     Multiple dilated small bowel loops in the right lower abdomen, these may potentially relate to the colonic distention with stool however if there is persistent or worsening symptomatology additional follow-up to exclude developing obstruction is recommended.     Mild wall and fold thickening of the stomach, correlation for gastritis is needed.     Interval  cholecystectomy, biliary dilatation may relate to passive biliary dilatation of prior cholecystectomy, clinical correlation otherwise needed.     Mild fluid at the gallbladder fossa does not appear to represent a walled off fluid collection and may relate to mild free fluid of the abdomen and pelvis.     Fluid collection associated with the postoperative left hip as discussed above.  Assessment/Plan:     * Colitis  Appears like diffuse colitis throughout - opioid -induced constipation and chronic antibiotics maybe contributing  IVF  Zosyn  No stool to culture thus far  General surgery consulted for suspected SBO - no surgical intervention at this time  Gi consulted - appears colitis is chronic   NPO   CRP and ESr pending   Trying to avoid heavy opioids but unlikely as she is dependent on them at this point        Seizure  Resume home oral keppra  Seizure precautions       Paroxysmal atrial fibrillation  Patient with Paroxysmal (<7 days) atrial fibrillation which is controlled currently with  no home meds . Patient is currently in sinus rhythm.[id number] Score: 3. HASBLED Score: . Anticoagulation not indicated due to sinus rhythm and h/o chronic gastric ulcer with bleeds .    Drug-induced constipation  Chronic opioids         Peripheral neuropathy  Resume home meds      Essential hypertension  Chronic, controlled. Latest blood pressure and vitals reviewed-     Temp:  [98.5 °F (36.9 °C)-98.8 °F (37.1 °C)]   Pulse:  [77-92]   Resp:  [10-27]   BP: (104-182)/(55-84)   SpO2:  [93 %-99 %] .   Home meds for hypertension were reviewed and noted below.   Hypertension Medications               amlodipine-benazepril 5-20 mg (LOTREL) 5-20 mg per capsule Take 1 capsule by mouth once daily.            While in the hospital, will manage blood pressure as follows; Continue home antihypertensive regimen    Will utilize p.r.n. blood pressure medication only if patient's blood pressure greater than 160/100 and she develops symptoms  such as worsening chest pain or shortness of breath.      VTE Risk Mitigation (From admission, onward)           Ordered     Reason for No Pharmacological VTE Prophylaxis  Once        Question:  Reasons:  Answer:  Already adequately anticoagulated on oral Anticoagulants    09/25/24 0823     IP VTE HIGH RISK PATIENT  Once         09/25/24 0823     Place sequential compression device  Until discontinued         09/25/24 0823                       On 09/25/2024, patient should be placed in hospital observation services under my care.             Marie De La Garza MD  Department of Hospital Medicine  [facility] - Emergency Dept

## 2024-09-25 NOTE — SUBJECTIVE & OBJECTIVE
No current facility-administered medications on file prior to encounter.     Current Outpatient Medications on File Prior to Encounter   Medication Sig    amlodipine-benazepril 5-20 mg (LOTREL) 5-20 mg per capsule Take 1 capsule by mouth once daily.    calcium carbonate (TUMS) 200 mg calcium (500 mg) chewable tablet Chew and swallow 2 tablets (1,000 mg total) by mouth once daily.    cyclobenzaprine (FLEXERIL) 10 MG tablet Take 1 tablet (10 mg total) by mouth 3 (three) times daily as needed for Muscle spasms.    docusate sodium (COLACE) 100 MG capsule Take 1 capsule (100 mg total) by mouth 2 (two) times daily.    doxycycline (VIBRAMYCIN) 100 MG Cap Take 100 mg by mouth every 12 (twelve) hours.    EScitalopram oxalate (LEXAPRO) 20 MG tablet Take 1 tablet (20 mg total) by mouth every evening.    levETIRAcetam (KEPPRA) 500 MG Tab Take 1 tablet (500 mg total) by mouth 2 (two) times daily.    omeprazole (PRILOSEC) 40 MG capsule TAKE 1 CAPSULE(40 MG) BY MOUTH EVERY MORNING    oxyCODONE-acetaminophen (PERCOCET)  mg per tablet Take 1 tablet by mouth every 4 (four) hours as needed for Pain.    pregabalin (LYRICA) 200 MG Cap Take 1 capsule (200 mg total) by mouth 3 (three) times daily.    acetaminophen (TYLENOL) 325 MG tablet Take 2 tablets (650 mg total) by mouth every 8 (eight) hours as needed for Pain (pain scale 1-4).    ergocalciferol (ERGOCALCIFEROL) 50,000 unit Cap Take 1 capsule (50,000 Units total) by mouth every 7 days.    nystatin (MYCOSTATIN) powder Apply to affected area 3 times daily (Patient taking differently: Apply 1 g topically 3 (three) times daily. Apply to affected area 3 times daily)    OPW TEST CLAIM - DO NOT FILL OPW test claim. Do not fill.    polyethylene glycol (GLYCOLAX) 17 gram/dose powder Use to cap to measure 17 grams, mix with liquid, and take by mouth once daily.    sodium chloride 0.9% SolP 50 mL with DAPTOmycin 500 mg SolR 676 mg Inject 676 mg into the vein Daily. End date 3/28/24     [DISCONTINUED] pantoprazole (PROTONIX) 40 MG tablet Take 1 tablet (40 mg total) by mouth 2 (two) times daily.       Review of patient's allergies indicates:  No Known Allergies    Past Medical History:   Diagnosis Date    Anemia     Arthritis     Bleeding ulcer 07/2016    Chronic pain     DDD (degenerative disc disease), cervical     DDD (degenerative disc disease), lumbar     Depression     Disc degeneration, lumbosacral     Diverticulitis     Encounter for blood transfusion     Hypertension     IBS (irritable bowel syndrome)     Neuropathy of both feet     Seizures     none  since 2017    Umbilical hernia 2020     Past Surgical History:   Procedure Laterality Date    ARTHROPLASTY, HIP, GIRDLESTONE, POSTERIOR APPROACH Left 1/19/2024    Procedure: ARTHROPLASTY, HIP, GIRDLESTONE, POSTERIOR APPROACH;  Surgeon: Bulmaro Tellez MD;  [facility];  Service: Orthopedics;  Laterality: Left;    ARTHROPLASTY, HIP, GIRDLESTONE, POSTERIOR APPROACH Left 1/25/2024    Procedure: Irrigation and debridement left hip,;  Surgeon: Juanito Painting MD;  Location: [facility] OR [facility];  Service: Orthopedics;  Laterality: Left;    ARTHROPLASTY, HIP, GIRDLESTONE, POSTERIOR APPROACH Left 2/15/2024    Procedure: Revision hip antibiotic spacer head exchange, left, lateral, peg board, depuy osteomed in room, vanc, ancef, txa,;  Surgeon: Bulmaro Tellez MD;  Location: [facility] OR [facility];  Service: Orthopedics;  Laterality: Left;    BACK SURGERY      BREAST BIOPSY      BREAST SURGERY Right     lumpectomy    CAUDAL EPIDURAL STEROID INJECTION N/A 01/28/2022    Procedure: Injection-steroid-epidural-caudal;  Surgeon: Luzmaria Marcelino MD;  Location: [facility] OR;  Service: Pain Management;  Laterality: N/A;    COLONOSCOPY N/A 01/14/2022    Procedure: COLONOSCOPY;  Surgeon: Abby Landers MD;  Location: [facility];  Service: Endoscopy;  Laterality: N/A;    ENDOSCOPIC ULTRASOUND OF UPPER GASTROINTESTINAL TRACT N/A 07/21/2020     Procedure: ULTRASOUND, UPPER GI TRACT, ENDOSCOPIC;  Surgeon: Marcio Nguyễn III, MD;  Location: [facility] ENDO;  Service: Endoscopy;  Laterality: N/A;    ESOPHAGOGASTRODUODENOSCOPY N/A 01/14/2022    Procedure: EGD (ESOPHAGOGASTRODUODENOSCOPY);  Surgeon: Abby Landers MD;  Location: [facility] ENDO;  Service: Endoscopy;  Laterality: N/A;    ESOPHAGOGASTRODUODENOSCOPY N/A 06/10/2022    Procedure: EGD (ESOPHAGOGASTRODUODENOSCOPY);  Surgeon: Abby Landers MD;  Location: [facility] ENDO;  Service: Endoscopy;  Laterality: N/A;    EYE SURGERY      cataract    FLAP GRAFT, LOCAL Left 2/15/2024    Procedure: FLAP GRAFT, LOCAL;  Surgeon: Bulmaro Tellez MD;  Location: [facility] OR 2ND FLR;  Service: Orthopedics;  Laterality: Left;    HYSTERECTOMY      INTRAMEDULLARY RODDING OF TROCHANTER OF FEMUR Left 12/11/2018    Procedure: INSERTION, INTRAMEDULLARY SANTOS, FEMUR, TROCHANTER;  Surgeon: Eulalio De La Cruz MD;  Location: [facility] OR;  Service: Orthopedics;  Laterality: Left;    IRRIGATION AND DEBRIDEMENT OF LOWER EXTREMITY Left 1/19/2024    Procedure: IRRIGATION AND DEBRIDEMENT, LOWER EXTREMITY;  Surgeon: Bulmaro Tellez MD;  Location: [facility] OR 2ND FLR;  Service: Orthopedics;  Laterality: Left;    IRRIGATION AND DEBRIDEMENT OF LOWER EXTREMITY Left 2/15/2024    Procedure: IRRIGATION AND DEBRIDEMENT, LOWER EXTREMITY;  Surgeon: Bulmaro Tellez MD;  Location: [facility] OR 2ND FLR;  Service: Orthopedics;  Laterality: Left;    LAPAROSCOPIC CHOLECYSTECTOMY N/A 9/8/2024    Procedure: CHOLECYSTECTOMY, LAPAROSCOPIC;  Surgeon: Cipriano Ambrosio MD;  Location: [facility] OR;  Service: General;  Laterality: N/A;    REPAIR OF EPIGASTRIC HERNIA N/A 12/7/2023    Procedure: REPAIR, HERNIA, EPIGASTRIC;  Surgeon: Vipul Escobar MD;  Location: [facility] OR;  Service: General;  Laterality: N/A;    SPINAL CORD STIMULATOR IMPLANT  09/18/2013    and removal    SPINE SURGERY  2006    lumbar L2-S1 decompression.    SPINE SURGERY      cervical decompression     TONSILLECTOMY       Family History       Problem Relation (Age of Onset)    COPD Brother    Coronary artery disease Father    Depression Father    Diabetes Mother, Father, Sister, Brother    Heart disease Father    Hypertension Mother, Father    Irritable bowel syndrome Mother          Tobacco Use    Smoking status: Never    Smokeless tobacco: Never   Substance and Sexual Activity    Alcohol use: No    Drug use: No    Sexual activity: Not Currently     Review of Systems   Constitutional:  Negative for activity change and appetite change.   Gastrointestinal:  Positive for abdominal pain. Negative for abdominal distention.   Skin:  Positive for wound. Negative for color change.     Objective:     Vital Signs (Most Recent):  Temp: 98.5 °F (36.9 °C) (09/25/24 0745)  Pulse: 76 (09/25/24 1200)  Resp: 15 (09/25/24 1200)  BP: 124/60 (09/25/24 1200)  SpO2: 96 % (09/25/24 1200) Vital Signs (24h Range):  Temp:  [98.5 °F (36.9 °C)-98.8 °F (37.1 °C)] 98.5 °F (36.9 °C)  Pulse:  [73-92] 76  Resp:  [9-27] 15  SpO2:  [93 %-100 %] 96 %  BP: (104-182)/(55-84) 124/60     Weight: 59 kg (130 lb)  Body mass index is 20.98 kg/m².     Physical Exam  Vitals reviewed.   Cardiovascular:      Rate and Rhythm: Normal rate.      Pulses: Normal pulses.   Pulmonary:      Effort: Pulmonary effort is normal.   Abdominal:      General: Abdomen is flat. There is no distension.      Palpations: There is no mass.      Tenderness: There is abdominal tenderness. There is no guarding.      Hernia: No hernia is present.      Comments: Mild diffuse tenderness.  No guarding.    Ecchymosis noted in central abdomen     Neurological:      Mental Status: She is alert.            I have reviewed all pertinent lab results within the past 24 hours.  CBC:   Recent Labs   Lab 09/25/24  0308   WBC 8.21   RBC 3.14*   HGB 8.6*   HCT 28.4*      MCV 90   MCH 27.4   MCHC 30.3*     BMP:   Recent Labs   Lab 09/25/24  0308   *      K 4.6      CO2 28    BUN 22   CREATININE 0.7   CALCIUM 9.0       Significant Diagnostics:  CT reviewed.  Significant stool burden in transverse and ascending colon. Thickening noted in L colon and rectosismoid colon.  Consistent with colitis.  NO acute surgical findings or complication in the hepatic fossa.

## 2024-09-25 NOTE — ASSESSMENT & PLAN NOTE
Suspect her symptoms possible related to colitis as they are similar in nature to what she was having prior to surgery.  No acute surgical issues.  Recommend Gi evaluation and possible cscope either as inpt or electively

## 2024-09-25 NOTE — ASSESSMENT & PLAN NOTE
Patient with Paroxysmal (<7 days) atrial fibrillation which is controlled currently with  no home meds . Patient is currently in sinus rhythm.[id number] Score: 3. HASBLED Score: . Anticoagulation not indicated due to sinus rhythm and h/o chronic gastric ulcer with bleeds .

## 2024-09-25 NOTE — HPI
ED HPI:  78-year-old woman with a history of recent cholecystectomy presents for evaluation of right upper and lower quadrant abdominal pain that has been occurring intermittently since her surgery but woke her up tonight and was very severe.  It is described as crampy.  She took Percocet at home with minimal relief.  She denies fever chills cough congestion shortness of breath.  She reports having all over chest pain earlier tonight when she was having the pain.  The chest pain has resolved.  She denies nausea vomiting.  She denies change in bladder habits.  She reports she last pooped yesterday, she reports she has not passed gas today.  Review of patient's allergies indicates:  No Known Allergies    On my evaluation, she is pretty lethargic after receiving IV keppra. She has a chronic hip wound and completed several weeks of IV antibiotics and now on doxycyline daily for indefinite amount of time per . No significant abnormalities on labs except for chronic low H/H. WBC in normal range but she does have a mild eosinophilia count (9.5%). No severe electrolyte abnormalities. She does oxycodone for chronic pain at home and has diagnosis of opioid induced constipation. CT imaging suggest possible early SBO but diffuse colitis findings throughout. General surgery consulted and recommends GI evaluation.   Continued on IV zosyn.

## 2024-09-25 NOTE — PLAN OF CARE
[facility] - Emergency Dept  Initial Discharge Assessment       Primary Care Provider: Alphonse Boothe III, MD    Admission Diagnosis: Partial intestinal obstruction, unspecified cause [K56.600]    Admission Date: 9/25/2024  Expected Discharge Date: 9/26/2024    Transition of Care Barriers: None     met with Pt at bedside to complete discharge assessment. Pt AAOx4s. Demographics, PCP, and insurance verified. No home health. No dialysis. Pt uses walker to ambulate and shower chair for showering. Pt reports ability to complete all other ADLs without assistance. Pt verbalized plan to discharge home via family transport. Pt has no other needs to be addressed at this time.     Payor: [employer]ET[employer] MANAGED MEDICARE / Plan: AET[employer] MEDICARE PLAN PPO / Product Type: Medicare Advantage /     Extended Emergency Contact Information  Primary Emergency Contact: Otoniel Ray  Address: [id number] [place] [address]  Home Phone: [phone]  Mobile Phone: [phone]  Relation: Spouse  Secondary Emergency Contact: SAHRA MCGUIRE  Mobile Phone: [phone]  Relation: Son    Discharge Plan A: Home with family  Discharge Plan B: Home      [id number] - [place] - [address]  [address] [address]  Phone: [phone] Fax: [phone]      Initial Assessment (most recent)       Adult Discharge Assessment - 09/25/24 1751          Discharge Assessment    Assessment Type Discharge Planning Assessment     Confirmed/corrected address, phone number and insurance Yes     Confirmed Demographics Correct on Facesheet     Source of Information patient     When was your last doctors appointment? --   A few months ago    Does patient/caregiver understand observation status Yes     Communicated BAILEY with patient/caregiver Date not available/Unable to determine     Reason For Admission Home     Facility Arrived From: Home     Do you expect to return to your  current living situation? Yes     Do you have help at home or someone to help you manage your care at home? Yes     Who are your caregiver(s) and their phone number(s)? Spouse: Otoniel Welch [phone] and Son: Aristeo Walls [phone]     Prior to hospitilization cognitive status: Alert/Oriented     Current cognitive status: Alert/Oriented     Walking or Climbing Stairs Difficulty yes     Walking or Climbing Stairs ambulation difficulty, requires equipment;stair climbing difficulty, requires equipment     Dressing/Bathing Difficulty yes     Dressing/Bathing bathing difficulty, requires equipment     Home Accessibility wheelchair accessible     Home Layout Able to live on 1st floor     Equipment Currently Used at Home none     Readmission within 30 days? Yes     Patient currently being followed by outpatient case management? No     Do you currently have service(s) that help you manage your care at home? No     Do you take prescription medications? Yes     Do you have prescription coverage? Yes     Coverage Payor: AETNA MANAGED MEDICARE - AETNA MEDICARE PLAN PPO -     Do you have any problems affording any of your prescribed medications? No     Is the patient taking medications as prescribed? yes     Who is going to help you get home at discharge? Spouse: Otoniel Welch [phone] and Son: Aristeo Walls [phone]     How do you get to doctors appointments? family or friend will provide     Are you on dialysis? No     Do you take coumadin? No     Discharge Plan A Home with family     Discharge Plan B Home     DME Needed Upon Discharge  none     Discharge Plan discussed with: Patient;Adult children     Transition of Care Barriers None        Physical Activity    On average, how many days per week do you engage in moderate to strenuous exercise (like a brisk walk)? 0 days     On average, how many minutes do you engage in exercise at this level? 0 min        Financial Resource Strain    How hard is it for you  to pay for the very basics like food, housing, medical care, and heating? Not hard at all        Housing Stability    In the last 12 months, was there a time when you were not able to pay the mortgage or rent on time? No     At any time in the past 12 months, were you homeless or living in a shelter (including now)? No        Transportation Needs    Has the lack of transportation kept you from medical appointments, meetings, work or from getting things needed for daily living? No        Food Insecurity    Within the past 12 months, you worried that your food would run out before you got the money to buy more. Never true     Within the past 12 months, the food you bought just didn't last and you didn't have money to get more. Never true        Stress    Do you feel stress - tense, restless, nervous, or anxious, or unable to sleep at night because your mind is troubled all the time - these days? Not at all        Social Isolation    How often do you feel lonely or isolated from those around you?  Never        Alcohol Use    Q1: How often do you have a drink containing alcohol? Never     Q2: How many drinks containing alcohol do you have on a typical day when you are drinking? Patient does not drink     Q3: How often do you have six or more drinks on one occasion? Never        Utilities    In the past 12 months has the electric, gas, oil, or water company threatened to shut off services in your home? No        Health Literacy    How often do you need to have someone help you when you read instructions, pamphlets, or other written material from your doctor or pharmacy? Never

## 2024-09-25 NOTE — HPI
78-year-old woman with a history of recent cholecystectomy (9/8) presents for evaluation of diffuse abdominal pain.  Notes that it has been occurring since surgery and is similar to pain she was having prior to surgery.  More severe and crampy last night prompting her to come to the ER.  She denies nausea vomiting.  She reports she last BM yesterday, she reports she has not passed gas today.  At time of my visit she, she is pretty lethargic after receiving IV keppra. She has a chronic hip wound and completed several weeks of IV antibiotics and now on doxycyline daily for indefinite amount of time per . No significant abnormalities on labs except for chronic low H/H. WBC in normal range but she does have a mild eosinophilia count (9.5%). No severe electrolyte abnormalities. She does oxycodone for chronic pain at home and has diagnosis of opioid induced constipation. CT findings suggestive of colitis and possible ileus vs early PSBO

## 2024-09-25 NOTE — ASSESSMENT & PLAN NOTE
Appears like diffuse colitis throughout - opioid -induced constipation and chronic antibiotics maybe contributing  IVF  Zosyn  No stool to culture thus far  General surgery consulted for suspected SBO - no surgical intervention at this time  Gi consulted - appears colitis is chronic   NPO   CRP and ESr pending   Trying to avoid heavy opioids but unlikely as she is dependent on them at this point

## 2024-09-25 NOTE — SUBJECTIVE & OBJECTIVE
Past Medical History:   Diagnosis Date    Anemia     Arthritis     Bleeding ulcer 07/2016    Chronic pain     DDD (degenerative disc disease), cervical     DDD (degenerative disc disease), lumbar     Depression     Disc degeneration, lumbosacral     Diverticulitis     Encounter for blood transfusion     Hypertension     IBS (irritable bowel syndrome)     Neuropathy of both feet     Seizures     none  since 2017    Umbilical hernia 2020       Past Surgical History:   Procedure Laterality Date    ARTHROPLASTY, HIP, GIRDLESTONE, POSTERIOR APPROACH Left 1/19/2024    Procedure: ARTHROPLASTY, HIP, GIRDLESTONE, POSTERIOR APPROACH;  Surgeon: Bulmaro Tellez MD;  [facility];  Service: Orthopedics;  Laterality: Left;    ARTHROPLASTY, HIP, GIRDLESTONE, POSTERIOR APPROACH Left 1/25/2024    Procedure: Irrigation and debridement left hip,;  Surgeon: Juanito Painting MD;  [facility];  Service: Orthopedics;  Laterality: Left;    ARTHROPLASTY, HIP, GIRDLESTONE, POSTERIOR APPROACH Left 2/15/2024    Procedure: Revision hip antibiotic spacer head exchange, left, lateral, peg board, depuy osteomed in room, vanc, ancef, txa,;  Surgeon: Bulmaro Tellez MD;  [facility];  Service: Orthopedics;  Laterality: Left;    BACK SURGERY      BREAST BIOPSY      BREAST SURGERY Right     lumpectomy    CAUDAL EPIDURAL STEROID INJECTION N/A 01/28/2022    Procedure: Injection-steroid-epidural-caudal;  Surgeon: Luzmaria Marcelino MD;  Location: [facility];  Service: Pain Management;  Laterality: N/A;    COLONOSCOPY N/A 01/14/2022    Procedure: COLONOSCOPY;  Surgeon: Abby Landers MD;  Location: [facility];  Service: Endoscopy;  Laterality: N/A;    ENDOSCOPIC ULTRASOUND OF UPPER GASTROINTESTINAL TRACT N/A 07/21/2020    Procedure: ULTRASOUND, UPPER GI TRACT, ENDOSCOPIC;  Surgeon: Marcio Nguyễn III, MD;  Location: [facility] ENDO;  Service: Endoscopy;  Laterality: N/A;    ESOPHAGOGASTRODUODENOSCOPY  N/A 01/14/2022    Procedure: EGD (ESOPHAGOGASTRODUODENOSCOPY);  Surgeon: Abby Landers MD;  Location: [facility] ENDO;  Service: Endoscopy;  Laterality: N/A;    ESOPHAGOGASTRODUODENOSCOPY N/A 06/10/2022    Procedure: EGD (ESOPHAGOGASTRODUODENOSCOPY);  Surgeon: Abby Landers MD;  Location: [facility] ENDO;  Service: Endoscopy;  Laterality: N/A;    EYE SURGERY      cataract    FLAP GRAFT, LOCAL Left 2/15/2024    Procedure: FLAP GRAFT, LOCAL;  Surgeon: Bulmaro Tellez MD;  Location: [facility] OR 2ND FLR;  Service: Orthopedics;  Laterality: Left;    HYSTERECTOMY      INTRAMEDULLARY RODDING OF TROCHANTER OF FEMUR Left 12/11/2018    Procedure: INSERTION, INTRAMEDULLARY SANTOS, FEMUR, TROCHANTER;  Surgeon: Eulalio De La Cruz MD;  Location: [facility] OR;  Service: Orthopedics;  Laterality: Left;    IRRIGATION AND DEBRIDEMENT OF LOWER EXTREMITY Left 1/19/2024    Procedure: IRRIGATION AND DEBRIDEMENT, LOWER EXTREMITY;  Surgeon: Bulmaro Tellez MD;  Location: [facility] OR 2ND FLR;  Service: Orthopedics;  Laterality: Left;    IRRIGATION AND DEBRIDEMENT OF LOWER EXTREMITY Left 2/15/2024    Procedure: IRRIGATION AND DEBRIDEMENT, LOWER EXTREMITY;  Surgeon: Bulmaro Tellez MD;  Location: [facility] OR 2ND FLR;  Service: Orthopedics;  Laterality: Left;    LAPAROSCOPIC CHOLECYSTECTOMY N/A 9/8/2024    Procedure: CHOLECYSTECTOMY, LAPAROSCOPIC;  Surgeon: Cipriano Ambrosio MD;  Location: [facility] OR;  Service: General;  Laterality: N/A;    REPAIR OF EPIGASTRIC HERNIA N/A 12/7/2023    Procedure: REPAIR, HERNIA, EPIGASTRIC;  Surgeon: Vipul Escobar MD;  Location: [facility] OR;  Service: General;  Laterality: N/A;    SPINAL CORD STIMULATOR IMPLANT  09/18/2013    and removal    SPINE SURGERY  2006    lumbar L2-S1 decompression.    SPINE SURGERY      cervical decompression    TONSILLECTOMY         Review of patient's allergies indicates:  No Known Allergies    No current facility-administered medications on file prior to encounter.     Current Outpatient  Medications on File Prior to Encounter   Medication Sig    amlodipine-benazepril 5-20 mg (LOTREL) 5-20 mg per capsule Take 1 capsule by mouth once daily.    calcium carbonate (TUMS) 200 mg calcium (500 mg) chewable tablet Chew and swallow 2 tablets (1,000 mg total) by mouth once daily.    cyclobenzaprine (FLEXERIL) 10 MG tablet Take 1 tablet (10 mg total) by mouth 3 (three) times daily as needed for Muscle spasms.    docusate sodium (COLACE) 100 MG capsule Take 1 capsule (100 mg total) by mouth 2 (two) times daily.    doxycycline (VIBRAMYCIN) 100 MG Cap Take 100 mg by mouth every 12 (twelve) hours.    EScitalopram oxalate (LEXAPRO) 20 MG tablet Take 1 tablet (20 mg total) by mouth every evening.    levETIRAcetam (KEPPRA) 500 MG Tab Take 1 tablet (500 mg total) by mouth 2 (two) times daily.    omeprazole (PRILOSEC) 40 MG capsule TAKE 1 CAPSULE(40 MG) BY MOUTH EVERY MORNING    oxyCODONE-acetaminophen (PERCOCET)  mg per tablet Take 1 tablet by mouth every 4 (four) hours as needed for Pain.    pregabalin (LYRICA) 200 MG Cap Take 1 capsule (200 mg total) by mouth 3 (three) times daily.    acetaminophen (TYLENOL) 325 MG tablet Take 2 tablets (650 mg total) by mouth every 8 (eight) hours as needed for Pain (pain scale 1-4).    ergocalciferol (ERGOCALCIFEROL) 50,000 unit Cap Take 1 capsule (50,000 Units total) by mouth every 7 days.    nystatin (MYCOSTATIN) powder Apply to affected area 3 times daily (Patient taking differently: Apply 1 g topically 3 (three) times daily. Apply to affected area 3 times daily)    OPW TEST CLAIM - DO NOT FILL OPW test claim. Do not fill.    polyethylene glycol (GLYCOLAX) 17 gram/dose powder Use to cap to measure 17 grams, mix with liquid, and take by mouth once daily.    sodium chloride 0.9% SolP 50 mL with DAPTOmycin 500 mg SolR 676 mg Inject 676 mg into the vein Daily. End date 3/28/24    [DISCONTINUED] pantoprazole (PROTONIX) 40 MG tablet Take 1 tablet (40 mg total) by mouth 2 (two)  times daily.     Family History       Problem Relation (Age of Onset)    COPD Brother    Coronary artery disease Father    Depression Father    Diabetes Mother, Father, Sister, Brother    Heart disease Father    Hypertension Mother, Father    Irritable bowel syndrome Mother          Tobacco Use    Smoking status: Never    Smokeless tobacco: Never   Substance and Sexual Activity    Alcohol use: No    Drug use: No    Sexual activity: Not Currently     Review of Systems   Constitutional:  Positive for activity change and appetite change.   Respiratory: Negative.     Cardiovascular: Negative.    Gastrointestinal:  Positive for abdominal pain, constipation and nausea. Negative for abdominal distention, blood in stool, diarrhea and vomiting.   Genitourinary: Negative.    Musculoskeletal:  Positive for arthralgias and gait problem.   Skin: Negative.    Neurological:  Positive for weakness.   Hematological:  Bruises/bleeds easily.   Psychiatric/Behavioral:  Positive for confusion.      Objective:     Vital Signs (Most Recent):  Temp: 98.5 °F (36.9 °C) (09/25/24 0745)  Pulse: 77 (09/25/24 0745)  Resp: 12 (09/25/24 0745)  BP: (!) 104/56 (09/25/24 0730)  SpO2: 97 % (09/25/24 0745) Vital Signs (24h Range):  Temp:  [98.5 °F (36.9 °C)-98.8 °F (37.1 °C)] 98.5 °F (36.9 °C)  Pulse:  [77-92] 77  Resp:  [10-27] 12  SpO2:  [93 %-99 %] 97 %  BP: (104-182)/(55-84) 104/56     Weight: 59 kg (130 lb)  Body mass index is 20.98 kg/m².     Physical Exam  Constitutional:       Comments: Lethargic but arousable    HENT:      Head: Normocephalic and atraumatic.      Mouth/Throat:      Mouth: Mucous membranes are dry.      Pharynx: Oropharynx is clear.   Cardiovascular:      Rate and Rhythm: Normal rate and regular rhythm.      Pulses: Normal pulses.   Pulmonary:      Effort: Pulmonary effort is normal. No respiratory distress.      Breath sounds: No wheezing.   Abdominal:      General: Bowel sounds are normal. There is no distension.       Palpations: Abdomen is soft.      Tenderness: There is abdominal tenderness. There is no guarding.      Comments: Bruising across upper abdominal wall    Musculoskeletal:         General: Signs of injury present. No swelling.      Cervical back: Normal range of motion and neck supple.   Skin:     General: Skin is warm and dry.      Capillary Refill: Capillary refill takes less than 2 seconds.      Coloration: Skin is pale.   Neurological:      General: No focal deficit present.      Cranial Nerves: No cranial nerve deficit.      Motor: Weakness present.                Significant Labs: All pertinent labs within the past 24 hours have been reviewed.  Recent Lab Results         09/25/24  0308        Albumin 3.6              ALT 15       Anion Gap 5       Appearance, UA Clear       AST 17       Baso # 0.07       Basophil % 0.9       Bilirubin (UA) Negative       BILIRUBIN TOTAL 0.5  Comment: For infants and newborns, interpretation of results should be based  on gestational age, weight and in agreement with clinical  observations.    Premature Infant recommended reference ranges:  Up to 24 hours.............<8.0 mg/dL  Up to 48 hours............<12.0 mg/dL  3-5 days..................<15.0 mg/dL  6-29 days.................<15.0 mg/dL         BUN 22       Calcium 9.0       Chloride 103       CO2 28       Color, UA Yellow       Creatinine 0.7       Differential Method Automated       eGFR >60.0       Eos # 0.8       Eos % 9.5       Glucose 118       Glucose, UA Negative       Gran # (ANC) 5.7       Gran % 69.1       Hematocrit 28.4       Hemoglobin 8.6       Immature Grans (Abs) 0.02  Comment: Mild elevation in immature granulocytes is non specific and   can be seen in a variety of conditions including stress response,   acute inflammation, trauma and pregnancy. Correlation with other   laboratory and clinical findings is essential.         Immature Granulocytes 0.2       Ketones, UA Negative       Leukocyte  Esterase, UA Negative       Lipase 5       Lymph # 1.2       Lymph % 14.0       MCH 27.4       MCHC 30.3       MCV 90       Mono # 0.5       Mono % 6.3       MPV 9.5       NITRITE UA Negative       nRBC 0       Blood, UA Negative       pH, UA 7.0       Platelet Count 405       Potassium 4.6       PROTEIN TOTAL 6.1       Protein, UA Negative  Comment: Recommend a 24 hour urine protein or a urine   protein/creatinine ratio if globulin induced proteinuria is  clinically suspected.         RBC 3.14       RDW 14.4       Sodium 136       Spec Grav UA 1.015       Specimen UA Urine, Clean Catch       Troponin I High Sensitivity 6.2  Comment: Troponin results differ between methods. Do not use   results between Troponin methods interchangeably.    Alkaline Phospatase levels above 400 U/L may   cause false positive results.    Access hsTnI should not be used for patients taking   Asfotase chelsea (Strensiq).         UROBILINOGEN UA Negative       WBC 8.21               Significant Imaging: I have reviewed all pertinent imaging results/findings within the past 24 hours.    CT abdomen and pelvis as read by radiology  Impression:     Circumferential wall thickening involving the left colon and rectosigmoid colon as well as a segment of the hepatic flexure of the right colon, correlation for colitis is needed.     There is mild to moderate distention along the transverse colon with moderate to prominent distention of the right colon, with stool, potentially secondary to partial obstructive change associated with the aforementioned findings of suspected colitis.     Multiple dilated small bowel loops in the right lower abdomen, these may potentially relate to the colonic distention with stool however if there is persistent or worsening symptomatology additional follow-up to exclude developing obstruction is recommended.     Mild wall and fold thickening of the stomach, correlation for gastritis is needed.     Interval  cholecystectomy, biliary dilatation may relate to passive biliary dilatation of prior cholecystectomy, clinical correlation otherwise needed.     Mild fluid at the gallbladder fossa does not appear to represent a walled off fluid collection and may relate to mild free fluid of the abdomen and pelvis.     Fluid collection associated with the postoperative left hip as discussed above.

## 2024-09-25 NOTE — CONSULTS
[facility] - Emergency Dept  General Surgery  Consult Note    Patient Name: Froilan Ray  MRN: [id number]  Code Status: Full Code  Admission Date: 9/25/2024  Hospital Length of Stay: 0 days  Attending Physician: Marie De La Garza MD  Primary Care Provider: Alphonse Boothe III, MD    Patient information was obtained from patient and ER records.     Consults  Subjective:     Principal Problem: Colitis    History of Present Illness:   78-year-old woman with a history of recent cholecystectomy (9/8) presents for evaluation of diffuse abdominal pain.  Notes that it has been occurring since surgery and is similar to pain she was having prior to surgery.  More severe and crampy last night prompting her to come to the ER.  She denies nausea vomiting.  She reports she last BM yesterday, she reports she has not passed gas today.  At time of my visit she, she is pretty lethargic after receiving IV keppra. She has a chronic hip wound and completed several weeks of IV antibiotics and now on doxycyline daily for indefinite amount of time per . No significant abnormalities on labs except for chronic low H/H. WBC in normal range but she does have a mild eosinophilia count (9.5%). No severe electrolyte abnormalities. She does oxycodone for chronic pain at home and has diagnosis of opioid induced constipation. CT findings suggestive of colitis and possible ileus vs early PSBO    No current facility-administered medications on file prior to encounter.     Current Outpatient Medications on File Prior to Encounter   Medication Sig    amlodipine-benazepril 5-20 mg (LOTREL) 5-20 mg per capsule Take 1 capsule by mouth once daily.    calcium carbonate (TUMS) 200 mg calcium (500 mg) chewable tablet Chew and swallow 2 tablets (1,000 mg total) by mouth once daily.    cyclobenzaprine (FLEXERIL) 10 MG tablet Take 1 tablet (10 mg total) by mouth 3 (three) times daily as needed for Muscle spasms.    docusate sodium  (COLACE) 100 MG capsule Take 1 capsule (100 mg total) by mouth 2 (two) times daily.    doxycycline (VIBRAMYCIN) 100 MG Cap Take 100 mg by mouth every 12 (twelve) hours.    EScitalopram oxalate (LEXAPRO) 20 MG tablet Take 1 tablet (20 mg total) by mouth every evening.    levETIRAcetam (KEPPRA) 500 MG Tab Take 1 tablet (500 mg total) by mouth 2 (two) times daily.    omeprazole (PRILOSEC) 40 MG capsule TAKE 1 CAPSULE(40 MG) BY MOUTH EVERY MORNING    oxyCODONE-acetaminophen (PERCOCET)  mg per tablet Take 1 tablet by mouth every 4 (four) hours as needed for Pain.    pregabalin (LYRICA) 200 MG Cap Take 1 capsule (200 mg total) by mouth 3 (three) times daily.    acetaminophen (TYLENOL) 325 MG tablet Take 2 tablets (650 mg total) by mouth every 8 (eight) hours as needed for Pain (pain scale 1-4).    ergocalciferol (ERGOCALCIFEROL) 50,000 unit Cap Take 1 capsule (50,000 Units total) by mouth every 7 days.    nystatin (MYCOSTATIN) powder Apply to affected area 3 times daily (Patient taking differently: Apply 1 g topically 3 (three) times daily. Apply to affected area 3 times daily)    OPW TEST CLAIM - DO NOT FILL OPW test claim. Do not fill.    polyethylene glycol (GLYCOLAX) 17 gram/dose powder Use to cap to measure 17 grams, mix with liquid, and take by mouth once daily.    sodium chloride 0.9% SolP 50 mL with DAPTOmycin 500 mg SolR 676 mg Inject 676 mg into the vein Daily. End date 3/28/24    [DISCONTINUED] pantoprazole (PROTONIX) 40 MG tablet Take 1 tablet (40 mg total) by mouth 2 (two) times daily.       Review of patient's allergies indicates:  No Known Allergies    Past Medical History:   Diagnosis Date    Anemia     Arthritis     Bleeding ulcer 07/2016    Chronic pain     DDD (degenerative disc disease), cervical     DDD (degenerative disc disease), lumbar     Depression     Disc degeneration, lumbosacral     Diverticulitis     Encounter for blood transfusion     Hypertension     IBS (irritable bowel syndrome)      Neuropathy of both feet     Seizures     none  since 2017    Umbilical hernia 2020     Past Surgical History:   Procedure Laterality Date    ARTHROPLASTY, HIP, GIRDLESTONE, POSTERIOR APPROACH Left 1/19/2024    Procedure: ARTHROPLASTY, HIP, GIRDLESTONE, POSTERIOR APPROACH;  Surgeon: Bulmaro Tellez MD;  Location: [facility] FLR;  Service: Orthopedics;  Laterality: Left;    ARTHROPLASTY, HIP, GIRDLESTONE, POSTERIOR APPROACH Left 1/25/2024    Procedure: Irrigation and debridement left hip,;  Surgeon: Juanito Painting MD;  Location: [facility] OR 2ND FLR;  Service: Orthopedics;  Laterality: Left;    ARTHROPLASTY, HIP, GIRDLESTONE, POSTERIOR APPROACH Left 2/15/2024    Procedure: Revision hip antibiotic spacer head exchange, left, lateral, peg board, depuy osteomed in room, [facility], ancef, txa,;  Surgeon: Bulmaro Tellez MD;  Location: [facility] OR [facility] FLR;  Service: Orthopedics;  Laterality: Left;    BACK SURGERY      BREAST BIOPSY      BREAST SURGERY Right     lumpectomy    CAUDAL EPIDURAL STEROID INJECTION N/A 01/28/2022    Procedure: Injection-steroid-epidural-caudal;  Surgeon: Luzmaria Marcelino MD;  Location: [facility] OR;  Service: Pain Management;  Laterality: N/A;    COLONOSCOPY N/A 01/14/2022    Procedure: COLONOSCOPY;  Surgeon: Abby Landers MD;  Location: [facility];  Service: Endoscopy;  Laterality: N/A;    ENDOSCOPIC ULTRASOUND OF UPPER GASTROINTESTINAL TRACT N/A 07/21/2020    Procedure: ULTRASOUND, UPPER GI TRACT, ENDOSCOPIC;  Surgeon: Marcio Nguyễn III, MD;  Location: [facility] ENDO;  Service: Endoscopy;  Laterality: N/A;    ESOPHAGOGASTRODUODENOSCOPY N/A 01/14/2022    Procedure: EGD (ESOPHAGOGASTRODUODENOSCOPY);  Surgeon: Abby Landers MD;  Location: [facility];  Service: Endoscopy;  Laterality: N/A;    ESOPHAGOGASTRODUODENOSCOPY N/A 06/10/2022    Procedure: EGD (ESOPHAGOGASTRODUODENOSCOPY);  Surgeon: Abby Landers MD;  Location: [facility];  Service: Endoscopy;  Laterality: N/A;    EYE SURGERY       cataract    FLAP GRAFT, LOCAL Left 2/15/2024    Procedure: FLAP GRAFT, LOCAL;  Surgeon: Bulmaro Tellez MD;  Location: [facility] OR 2ND FLR;  Service: Orthopedics;  Laterality: Left;    HYSTERECTOMY      INTRAMEDULLARY RODDING OF TROCHANTER OF FEMUR Left 12/11/2018    Procedure: INSERTION, INTRAMEDULLARY SANTOS, FEMUR, TROCHANTER;  Surgeon: Eulalio De La Cruz MD;  Location: [facility] OR;  Service: Orthopedics;  Laterality: Left;    IRRIGATION AND DEBRIDEMENT OF LOWER EXTREMITY Left 1/19/2024    Procedure: IRRIGATION AND DEBRIDEMENT, LOWER EXTREMITY;  Surgeon: Bulmaro Tellez MD;  Location: [facility] OR 2ND FLR;  Service: Orthopedics;  Laterality: Left;    IRRIGATION AND DEBRIDEMENT OF LOWER EXTREMITY Left 2/15/2024    Procedure: IRRIGATION AND DEBRIDEMENT, LOWER EXTREMITY;  Surgeon: Bulmaro Tellez MD;  Location: [facility] OR 2ND FLR;  Service: Orthopedics;  Laterality: Left;    LAPAROSCOPIC CHOLECYSTECTOMY N/A 9/8/2024    Procedure: CHOLECYSTECTOMY, LAPAROSCOPIC;  Surgeon: Cipriano Ambrosio MD;  Location: [facility] OR;  Service: General;  Laterality: N/A;    REPAIR OF EPIGASTRIC HERNIA N/A 12/7/2023    Procedure: REPAIR, HERNIA, EPIGASTRIC;  Surgeon: Vipul Escobar MD;  Location: [facility] OR;  Service: General;  Laterality: N/A;    SPINAL CORD STIMULATOR IMPLANT  09/18/2013    and removal    SPINE SURGERY  2006    lumbar L2-S1 decompression.    SPINE SURGERY      cervical decompression    TONSILLECTOMY       Family History       Problem Relation (Age of Onset)    COPD Brother    Coronary artery disease Father    Depression Father    Diabetes Mother, Father, Sister, Brother    Heart disease Father    Hypertension Mother, Father    Irritable bowel syndrome Mother          Tobacco Use    Smoking status: Never    Smokeless tobacco: Never   Substance and Sexual Activity    Alcohol use: No    Drug use: No    Sexual activity: Not Currently     Review of Systems   Constitutional:  Negative for activity change and appetite change.    Gastrointestinal:  Positive for abdominal pain. Negative for abdominal distention.   Skin:  Positive for wound. Negative for color change.     Objective:     Vital Signs (Most Recent):  Temp: 98.5 °F (36.9 °C) (09/25/24 0745)  Pulse: 76 (09/25/24 1200)  Resp: 15 (09/25/24 1200)  BP: 124/60 (09/25/24 1200)  SpO2: 96 % (09/25/24 1200) Vital Signs (24h Range):  Temp:  [98.5 °F (36.9 °C)-98.8 °F (37.1 °C)] 98.5 °F (36.9 °C)  Pulse:  [73-92] 76  Resp:  [9-27] 15  SpO2:  [93 %-100 %] 96 %  BP: (104-182)/(55-84) 124/60     Weight: 59 kg (130 lb)  Body mass index is 20.98 kg/m².     Physical Exam  Vitals reviewed.   Cardiovascular:      Rate and Rhythm: Normal rate.      Pulses: Normal pulses.   Pulmonary:      Effort: Pulmonary effort is normal.   Abdominal:      General: Abdomen is flat. There is no distension.      Palpations: There is no mass.      Tenderness: There is abdominal tenderness. There is no guarding.      Hernia: No hernia is present.      Comments: Mild diffuse tenderness.  No guarding.    Ecchymosis noted in central abdomen     Neurological:      Mental Status: She is alert.            I have reviewed all pertinent lab results within the past 24 hours.  CBC:   Recent Labs   Lab 09/25/24  0308   WBC 8.21   RBC 3.14*   HGB 8.6*   HCT 28.4*      MCV 90   MCH 27.4   MCHC 30.3*     BMP:   Recent Labs   Lab 09/25/24  0308   *      K 4.6      CO2 28   BUN 22   CREATININE 0.7   CALCIUM 9.0       Significant Diagnostics:  CT reviewed.  Significant stool burden in transverse and ascending colon. Thickening noted in L colon and rectosismoid colon.  Consistent with colitis.  NO acute surgical findings or complication in the hepatic fossa.    Assessment/Plan:     * Colitis  Suspect her symptoms possible related to colitis as they are similar in nature to what she was having prior to surgery.  No acute surgical issues.  Recommend Gi evaluation and possible cscope either as inpt or electively        VTE Risk Mitigation (From admission, onward)           Ordered     Reason for No Pharmacological VTE Prophylaxis  Once        Question:  Reasons:  Answer:  Already adequately anticoagulated on oral Anticoagulants    09/25/24 0823     IP VTE HIGH RISK PATIENT  Once         09/25/24 0823     Place sequential compression device  Until discontinued         09/25/24 0823                    Thank you for your consult. I will follow-up with patient. Please contact us if you have any additional questions.    Cipriano Ambrosio MD  General Surgery  [facility] - Emergency Dept

## 2024-09-25 NOTE — CONSULTS
[facility] - Emergency Dept  General Surgery  Consult Note    Patient Name: Froilan Ray  MRN: [id number]  Code Status: Full Code  Admission Date: 9/25/2024  Hospital Length of Stay: 0 days  Attending Physician: Marie De La Garza MD  Primary Care Provider: Alphonse Boothe III, MD    Patient information was obtained from patient and ER records.     Inpatient consult to General surgery  Consult performed by: Cipriano Ambrosio MD  Consult ordered by: Marie De La Garza MD        Subjective:     Principal Problem: Colitis    History of Present Illness:   78-year-old woman with a history of recent cholecystectomy (9/8) presents for evaluation of diffuse abdominal pain.  Notes that it has been occurring since surgery and is similar to pain she was having prior to surgery.  More severe and crampy last night prompting her to come to the ER.  She denies nausea vomiting.  She reports she last BM yesterday, she reports she has not passed gas today.  At time of my visit she, she is pretty lethargic after receiving IV keppra. She has a chronic hip wound and completed several weeks of IV antibiotics and now on doxycyline daily for indefinite amount of time per . No significant abnormalities on labs except for chronic low H/H. WBC in normal range but she does have a mild eosinophilia count (9.5%). No severe electrolyte abnormalities. She does oxycodone for chronic pain at home and has diagnosis of opioid induced constipation. CT findings suggestive of colitis and possible ileus vs early PSBO    No new subjective & objective note has been filed under this hospital service since the last note was generated.    Assessment/Plan:     * Colitis  Suspect her symptoms possible related to colitis as they are similar in nature to what she was having prior to surgery.  No acute surgical issues.  Recommend Gi evaluation and possible cscope either as inpt or electively       VTE Risk Mitigation (From admission,  onward)           Ordered     Reason for No Pharmacological VTE Prophylaxis  Once        Question:  Reasons:  Answer:  Already adequately anticoagulated on oral Anticoagulants    09/25/24 0823     IP VTE HIGH RISK PATIENT  Once         09/25/24 0823     Place sequential compression device  Until discontinued         09/25/24 0823                    Thank you for your consult. I will follow-up with patient. Please contact us if you have any additional questions.    Cipriano Ambrosio MD  General Surgery  [facility] - Emergency Dept

## 2024-09-26 LAB
ALBUMIN SERPL BCP-MCNC: 3.2 G/DL (ref 3.5–5.2)
ALP SERPL-CCNC: 222 U/L (ref 55–135)
ALT SERPL W/O P-5'-P-CCNC: 22 U/L (ref 10–44)
ANION GAP SERPL CALC-SCNC: 6 MMOL/L (ref 8–16)
AST SERPL-CCNC: 24 U/L (ref 10–40)
BASOPHILS # BLD AUTO: 0.06 K/UL (ref 0–0.2)
BASOPHILS NFR BLD: 0.9 % (ref 0–1.9)
BILIRUB SERPL-MCNC: 0.9 MG/DL (ref 0.1–1)
BUN SERPL-MCNC: 12 MG/DL (ref 8–23)
CALCIUM SERPL-MCNC: 8.8 MG/DL (ref 8.7–10.5)
CHLORIDE SERPL-SCNC: 102 MMOL/L (ref 95–110)
CO2 SERPL-SCNC: 27 MMOL/L (ref 23–29)
CREAT SERPL-MCNC: 0.7 MG/DL (ref 0.5–1.4)
DIFFERENTIAL METHOD BLD: ABNORMAL
EOSINOPHIL # BLD AUTO: 1.3 K/UL (ref 0–0.5)
EOSINOPHIL NFR BLD: 19.4 % (ref 0–8)
ERYTHROCYTE [DISTWIDTH] IN BLOOD BY AUTOMATED COUNT: 14.3 % (ref 11.5–14.5)
EST. GFR  (NO RACE VARIABLE): >60 ML/MIN/1.73 M^2
GLUCOSE SERPL-MCNC: 112 MG/DL (ref 70–110)
HCT VFR BLD AUTO: 28.2 % (ref 37–48.5)
HGB BLD-MCNC: 8.4 G/DL (ref 12–16)
IMM GRANULOCYTES # BLD AUTO: 0.03 K/UL (ref 0–0.04)
IMM GRANULOCYTES NFR BLD AUTO: 0.4 % (ref 0–0.5)
LYMPHOCYTES # BLD AUTO: 0.8 K/UL (ref 1–4.8)
LYMPHOCYTES NFR BLD: 12.1 % (ref 18–48)
MAGNESIUM SERPL-MCNC: 2 MG/DL (ref 1.6–2.6)
MCH RBC QN AUTO: 26.6 PG (ref 27–31)
MCHC RBC AUTO-ENTMCNC: 29.8 G/DL (ref 32–36)
MCV RBC AUTO: 89 FL (ref 82–98)
MONOCYTES # BLD AUTO: 0.4 K/UL (ref 0.3–1)
MONOCYTES NFR BLD: 6.1 % (ref 4–15)
NEUTROPHILS # BLD AUTO: 4.2 K/UL (ref 1.8–7.7)
NEUTROPHILS NFR BLD: 61.1 % (ref 38–73)
NRBC BLD-RTO: 0 /100 WBC
OHS QRS DURATION: 86 MS
OHS QTC CALCULATION: 411 MS
PLATELET # BLD AUTO: 435 K/UL (ref 150–450)
PMV BLD AUTO: 9.5 FL (ref 9.2–12.9)
POTASSIUM SERPL-SCNC: 4.5 MMOL/L (ref 3.5–5.1)
PROT SERPL-MCNC: 5.7 G/DL (ref 6–8.4)
RBC # BLD AUTO: 3.16 M/UL (ref 4–5.4)
SODIUM SERPL-SCNC: 135 MMOL/L (ref 136–145)
WBC # BLD AUTO: 6.84 K/UL (ref 3.9–12.7)

## 2024-09-26 PROCEDURE — 25000003 PHARM REV CODE 250: Performed by: INTERNAL MEDICINE

## 2024-09-26 PROCEDURE — 36415 COLL VENOUS BLD VENIPUNCTURE: CPT | Performed by: HOSPITALIST

## 2024-09-26 PROCEDURE — 96375 TX/PRO/DX INJ NEW DRUG ADDON: CPT

## 2024-09-26 PROCEDURE — 63600175 PHARM REV CODE 636 W HCPCS: Performed by: FAMILY MEDICINE

## 2024-09-26 PROCEDURE — 80053 COMPREHEN METABOLIC PANEL: CPT | Performed by: HOSPITALIST

## 2024-09-26 PROCEDURE — 96366 THER/PROPH/DIAG IV INF ADDON: CPT

## 2024-09-26 PROCEDURE — 63600175 PHARM REV CODE 636 W HCPCS: Performed by: STUDENT IN AN ORGANIZED HEALTH CARE EDUCATION/TRAINING PROGRAM

## 2024-09-26 PROCEDURE — 85025 COMPLETE CBC W/AUTO DIFF WBC: CPT | Performed by: HOSPITALIST

## 2024-09-26 PROCEDURE — 96361 HYDRATE IV INFUSION ADD-ON: CPT

## 2024-09-26 PROCEDURE — 63600175 PHARM REV CODE 636 W HCPCS: Performed by: HOSPITALIST

## 2024-09-26 PROCEDURE — 25000003 PHARM REV CODE 250: Performed by: HOSPITALIST

## 2024-09-26 PROCEDURE — 96376 TX/PRO/DX INJ SAME DRUG ADON: CPT

## 2024-09-26 PROCEDURE — 21400001 HC TELEMETRY ROOM

## 2024-09-26 PROCEDURE — 83735 ASSAY OF MAGNESIUM: CPT | Performed by: HOSPITALIST

## 2024-09-26 RX ORDER — HYDROMORPHONE HYDROCHLORIDE 1 MG/ML
1 INJECTION, SOLUTION INTRAMUSCULAR; INTRAVENOUS; SUBCUTANEOUS ONCE
Status: COMPLETED | OUTPATIENT
Start: 2024-09-26 | End: 2024-09-26

## 2024-09-26 RX ORDER — KETOROLAC TROMETHAMINE 30 MG/ML
15 INJECTION, SOLUTION INTRAMUSCULAR; INTRAVENOUS EVERY 6 HOURS PRN
Status: COMPLETED | OUTPATIENT
Start: 2024-09-26 | End: 2024-09-27

## 2024-09-26 RX ADMIN — LEVETIRACETAM 500 MG: 500 TABLET, FILM COATED ORAL at 09:09

## 2024-09-26 RX ADMIN — DOCUSATE SODIUM 100 MG: 100 CAPSULE, LIQUID FILLED ORAL at 09:09

## 2024-09-26 RX ADMIN — DICYCLOMINE HYDROCHLORIDE 10 MG: 10 CAPSULE ORAL at 09:09

## 2024-09-26 RX ADMIN — Medication 6 MG: at 01:09

## 2024-09-26 RX ADMIN — HYDROMORPHONE HYDROCHLORIDE 1 MG: 1 INJECTION, SOLUTION INTRAMUSCULAR; INTRAVENOUS; SUBCUTANEOUS at 03:09

## 2024-09-26 RX ADMIN — LEVETIRACETAM 500 MG: 500 TABLET, FILM COATED ORAL at 10:09

## 2024-09-26 RX ADMIN — DICYCLOMINE HYDROCHLORIDE 10 MG: 10 CAPSULE ORAL at 03:09

## 2024-09-26 RX ADMIN — MORPHINE SULFATE 2 MG: 2 INJECTION, SOLUTION INTRAMUSCULAR; INTRAVENOUS at 12:09

## 2024-09-26 RX ADMIN — PIPERACILLIN SODIUM AND TAZOBACTAM SODIUM 3.38 G: 3; .375 INJECTION, POWDER, LYOPHILIZED, FOR SOLUTION INTRAVENOUS at 05:09

## 2024-09-26 RX ADMIN — ESCITALOPRAM OXALATE 20 MG: 10 TABLET ORAL at 10:09

## 2024-09-26 RX ADMIN — DICYCLOMINE HYDROCHLORIDE 10 MG: 10 CAPSULE ORAL at 10:09

## 2024-09-26 RX ADMIN — ALUMINUM HYDROXIDE, MAGNESIUM HYDROXIDE, AND SIMETHICONE 30 ML: 200; 200; 20 SUSPENSION ORAL at 01:09

## 2024-09-26 RX ADMIN — KETOROLAC TROMETHAMINE 15 MG: 30 INJECTION, SOLUTION INTRAMUSCULAR; INTRAVENOUS at 11:09

## 2024-09-26 RX ADMIN — DOCUSATE SODIUM 100 MG: 100 CAPSULE, LIQUID FILLED ORAL at 10:09

## 2024-09-26 RX ADMIN — KETOROLAC TROMETHAMINE 15 MG: 30 INJECTION, SOLUTION INTRAMUSCULAR; INTRAVENOUS at 02:09

## 2024-09-26 NOTE — ASSESSMENT & PLAN NOTE
Appears like diffuse colitis throughout - opioid -induced constipation and chronic antibiotics maybe contributing  IVF  Zosyn  No stool to culture thus far  General surgery consulted for suspected SBO - no surgical intervention at this time  Gi consulted - appears colitis is chronic   NPO   CRP and ESr pending   Trying to avoid heavy opioids but unlikely as she is dependent on them at this point  Gastroenterology also consulted and is obtaining MRCP, further recommendations to follow.

## 2024-09-26 NOTE — PROGRESS NOTES
"[facility]  Adult Nutrition   Progress Note (Initial Assessment)     SUMMARY     Recommendations  1. Continue Low fiber diet and encourage po intake.   2. Recommend Ensure HP BID.   3. RD following.  Goals: Improve po intake to meet > 50% of estimated needs.  Nutrition Goal Status: progressing towards goal    Nutrition Diagnosis PES Statement: Inadequate (suboptimal) protein-energy intake related to poor appetite as evidenced by po intake < 50% of estimated needs over the past few days.      Dietitian Rounds Brief  MST: 3.  Pt with decreased appetite and po intake over the past few weeks. She had gallbladder surgey about 3 weeks ago. She has had some weight loss since January 13%- does not meet ASPEN criteria for malnutrition.     Nutrition Related Social Determinants of Health: SDOH: None Identified   Food Insecurity: No Food Insecurity (9/25/2024)    Hunger Vital Sign     Worried About Running Out of Food in the Last Year: Never true     Ran Out of Food in the Last Year: Never true        Malnutrition Assessment   Lacks sufficient data for malnutrition classification per AND/ASPEN malnutrition criteria    Diet order:   Current Diet Order: Low fiber diet        Evaluation of Received Nutrient/Fluid Intake  Energy Calories Required: not meeting needs  Protein Required: not meeting needs  Fluid Required: not meeting needs      % Intake of Estimated Energy Needs: 0 - 25 %  % Meal Intake: 0 - 25 %      Intake/Output Summary (Last 24 hours) at 9/26/2024 1716  Last data filed at 9/26/2024 1535  Gross per 24 hour   Intake 560 ml   Output --   Net 560 ml        Anthropometrics  Temp: 98.3 °F (36.8 °C)  Height Method: Stated  Height: 5' 6" (167.6 cm)  Height (inches): 66 in  Weight Method: Bed Scale  Weight: 59.8 kg (131 lb 13.4 oz)  Weight (lb): 131.84 lb  Ideal Body Weight (IBW), Female: 130 lb  % Ideal Body Weight, Female (lb): 101.42 %  BMI (Calculated): 21.3  BMI Grade: 18.5-24.9 - normal   "     Estimated/Assessed Needs  Weight Used For Calorie Calculations: 59.8 kg (131 lb 13.4 oz)  Energy Calorie Requirements (kcal): 0395-8815 kcal (25-30 kcal/ kg bw)  Energy Need Method: Kcal/kg  Protein Requirements: 59-90 gm (1.0-1.5 gm/ kg bw)  Weight Used For Protein Calculations: 59.8 kg (131 lb 13.4 oz)     Estimated Fluid Requirement Method: RDA Method  RDA Method (mL): 1495  CHO Requirement: n/a    Reason for Assessment  Reason For Assessment: identified at risk by screening criteria  Diagnosis: gastrointestinal disease  Interdisciplinary Rounds: did not attend  Nutrition Discharge Planning: Low fiber diet  Past Medical History:   Diagnosis Date    Anemia     Arthritis     Bleeding ulcer 07/2016    Chronic pain     DDD (degenerative disc disease), cervical     DDD (degenerative disc disease), lumbar     Depression     Disc degeneration, lumbosacral     Diverticulitis     Encounter for blood transfusion     Hypertension     IBS (irritable bowel syndrome)     Neuropathy of both feet     Seizures     none  since 2017    Umbilical hernia 2020      Nutrition/Diet History  Patient Reported Diet/Restrictions/Preferences: general  Spiritual, Cultural Beliefs, Gnosticism Practices, Values that Affect Care: no  Food Allergies: NKFA    Nutrition Risk Screen  Nutrition Risk Screen: no indicators present     MST Score: 3  Have you recently lost weight without trying?: Yes: 14-23 lbs  Weight loss score: 2  Have you been eating poorly because of a decreased appetite?: Yes  Appetite score: 1       Weight History:  Wt Readings from Last 10 Encounters:   09/25/24 59.8 kg (131 lb 13.4 oz)   09/08/24 57.4 kg (126 lb 8.7 oz)   08/07/24 60.3 kg (133 lb)   03/20/24 59 kg (130 lb)   02/29/24 67.9 kg (149 lb 11.1 oz)   02/16/24 67.9 kg (149 lb 11.1 oz)   02/14/24 67.6 kg (149 lb)   02/08/24 68 kg (149 lb 14.6 oz)   02/06/24 68 kg (150 lb)   01/22/24 63.9 kg (140 lb 14 oz)        Lab/Procedures/Meds: Pertinent Labs/Meds  Reviewed    Medications:Pertinent Medications Reviewed  Scheduled Meds:   dicyclomine  10 mg Oral QID    docusate sodium  100 mg Oral BID    EScitalopram oxalate  20 mg Oral QHS    levETIRAcetam  500 mg Oral BID     Labs: Pertinent Labs Reviewed  Clinical Chemistry:  Recent Labs   Lab 09/25/24  0308 09/26/24  0430    135*   K 4.6 4.5    102   CO2 28 27   * 112*   BUN 22 12   CREATININE 0.7 0.7   CALCIUM 9.0 8.8   PROT 6.1 5.7*   ALBUMIN 3.6 3.2*   BILITOT 0.5 0.9   ALKPHOS 177* 222*   AST 17 24   ALT 15 22   ANIONGAP 5* 6*   MG  --  2.0   LIPASE 5  --          Monitor and Evaluation  Food and Nutrient Intake: energy intake, food and beverage intake  Food and Nutrient Adminstration: diet order  Knowledge/Beliefs/Attitudes: food and nutrition knowledge/skill, beliefs and attitudes  Physical Activity and Function: nutrition-related ADLs and IADLs  Anthropometric Measurements: weight, weight change  Biochemical Data, Medical Tests and Procedures: electrolyte and renal panel, gastrointestinal profile, glucose/endocrine profile, inflammatory profile, lipid profile  Nutrition-Focused Physical Findings: overall appearance     Nutrition Risk  Level of Risk/Frequency of Follow-up:  (high 2 x week)     Nutrition Follow-Up  RD Follow-up?: Yes    Treasure Ma RD 09/26/2024 5:16 PM

## 2024-09-26 NOTE — PROGRESS NOTES
Patient seen and examined.  Resting comfortably in bed.  Notes her pain seems to have improved.  Asking when she can go home    CTA reviewed.  Thickening around the colon noted.      Wt Readings from Last 3 Encounters:   09/25/24 59.8 kg (131 lb 13.4 oz)   09/08/24 57.4 kg (126 lb 8.7 oz)   08/07/24 60.3 kg (133 lb)     Temp Readings from Last 3 Encounters:   09/26/24 98.3 °F (36.8 °C) (Oral)   09/09/24 97.4 °F (36.3 °C) (Oral)   08/08/24 98.3 °F (36.8 °C) (Oral)     BP Readings from Last 3 Encounters:   09/26/24 117/65   09/09/24 (!) 115/56   08/08/24 136/66     Pulse Readings from Last 3 Encounters:   09/26/24 83   09/09/24 88   08/08/24 78     AAox3  Soft/nd/nt    Colitis likely ischemic in nature     No acute surgical plans.  Patient will likely need outpatient endoscopy to evaluate for improvement.  Can follow up with me on a p.r.n. basis

## 2024-09-26 NOTE — PROGRESS NOTES
[facility] Medicine  Progress Note    Patient Name: Froilan Ray  MRN: [id number]  Patient Class: OP- Observation   Admission Date: 9/25/2024  Length of Stay: 0 days  Attending Physician: Micah Cortes DO  Primary Care Provider: Alphonse Boothe III, MD        Subjective:     Principal Problem:Colitis        HPI:  ED HPI:  78-year-old woman with a history of recent cholecystectomy presents for evaluation of right upper and lower quadrant abdominal pain that has been occurring intermittently since her surgery but woke her up tonight and was very severe.  It is described as crampy.  She took Percocet at home with minimal relief.  She denies fever chills cough congestion shortness of breath.  She reports having all over chest pain earlier tonight when she was having the pain.  The chest pain has resolved.  She denies nausea vomiting.  She denies change in bladder habits.  She reports she last pooped yesterday, she reports she has not passed gas today.  Review of patient's allergies indicates:  No Known Allergies    On my evaluation, she is pretty lethargic after receiving IV keppra. She has a chronic hip wound and completed several weeks of IV antibiotics and now on doxycyline daily for indefinite amount of time per . No significant abnormalities on labs except for chronic low H/H. WBC in normal range but she does have a mild eosinophilia count (9.5%). No severe electrolyte abnormalities. She does oxycodone for chronic pain at home and has diagnosis of opioid induced constipation. CT imaging suggest possible early SBO but diffuse colitis findings throughout. General surgery consulted and recommends GI evaluation.   Continued on IV zosyn.     Overview/Hospital Course:  78-year-old woman with a history of recent cholecystectomy presents for evaluation of right upper and lower quadrant abdominal pain.  General surgery and they do not believe there is any role for surgical  intervention.  Also evaluated by Gastroenterology consideration of EGD and are sending patient for MRCP today.  Further recommendations to follow up.    Interval History:  Patient seen and examined abdominal pain comes and goes.    Review of Systems   Constitutional:  Positive for activity change and appetite change.   Respiratory: Negative.     Cardiovascular: Negative.    Gastrointestinal:  Positive for abdominal pain, constipation and nausea. Negative for abdominal distention, blood in stool, diarrhea and vomiting.   Genitourinary: Negative.    Musculoskeletal:  Positive for arthralgias and gait problem.   Skin: Negative.    Neurological:  Positive for weakness.   Hematological:  Bruises/bleeds easily.   Psychiatric/Behavioral:  Negative for confusion.      Objective:     Vital Signs (Most Recent):  Temp: 98.3 °F (36.8 °C) (09/26/24 1517)  Pulse: 83 (09/26/24 1517)  Resp: 16 (09/26/24 1517)  BP: 117/65 (09/26/24 1517)  SpO2: (!) 92 % (09/26/24 1517) Vital Signs (24h Range):  Temp:  [97.9 °F (36.6 °C)-98.3 °F (36.8 °C)] 98.3 °F (36.8 °C)  Pulse:  [78-87] 83  Resp:  [16-18] 16  SpO2:  [92 %-96 %] 92 %  BP: (111-159)/(62-77) 117/65     Weight: 59.8 kg (131 lb 13.4 oz)  Body mass index is 21.28 kg/m².    Intake/Output Summary (Last 24 hours) at 9/26/2024 1649  Last data filed at 9/26/2024 1535  Gross per 24 hour   Intake 560 ml   Output --   Net 560 ml         Physical Exam  Constitutional:       Comments: Lethargic but arousable    HENT:      Head: Normocephalic and atraumatic.      Mouth/Throat:      Mouth: Mucous membranes are dry.      Pharynx: Oropharynx is clear.   Cardiovascular:      Rate and Rhythm: Normal rate and regular rhythm.      Pulses: Normal pulses.   Pulmonary:      Effort: Pulmonary effort is normal. No respiratory distress.      Breath sounds: No wheezing.   Abdominal:      General: Bowel sounds are normal. There is no distension.      Palpations: Abdomen is soft.      Tenderness: There is  abdominal tenderness. There is no guarding.      Comments: Bruising across upper abdominal wall    Musculoskeletal:         General: Signs of injury present. No swelling.      Cervical back: Normal range of motion and neck supple.   Skin:     General: Skin is warm and dry.      Capillary Refill: Capillary refill takes less than 2 seconds.      Coloration: Skin is pale.   Neurological:      General: No focal deficit present.      Cranial Nerves: No cranial nerve deficit.      Motor: Weakness present.             Significant Labs: All pertinent labs within the past 24 hours have been reviewed.  Recent Lab Results         09/26/24  0430        Albumin 3.2              ALT 22       Anion Gap 6       AST 24       Baso # 0.06       Basophil % 0.9       BILIRUBIN TOTAL 0.9  Comment: For infants and newborns, interpretation of results should be based  on gestational age, weight and in agreement with clinical  observations.    Premature Infant recommended reference ranges:  Up to 24 hours.............<8.0 mg/dL  Up to 48 hours............<12.0 mg/dL  3-5 days..................<15.0 mg/dL  6-29 days.................<15.0 mg/dL         BUN 12       Calcium 8.8       Chloride 102       CO2 27       Creatinine 0.7       Differential Method Automated       eGFR >60.0       Eos # 1.3       Eos % 19.4       Glucose 112       Gran # (ANC) 4.2       Gran % 61.1       Hematocrit 28.2       Hemoglobin 8.4       Immature Grans (Abs) 0.03  Comment: Mild elevation in immature granulocytes is non specific and   can be seen in a variety of conditions including stress response,   acute inflammation, trauma and pregnancy. Correlation with other   laboratory and clinical findings is essential.         Immature Granulocytes 0.4       Lymph # 0.8       Lymph % 12.1       Magnesium  2.0       MCH 26.6       MCHC 29.8       MCV 89       Mono # 0.4       Mono % 6.1       MPV 9.5       nRBC 0       Platelet Count 435       Potassium 4.5        PROTEIN TOTAL 5.7       RBC 3.16       RDW 14.3       Sodium 135       WBC 6.84               Significant Imaging: I have reviewed all pertinent imaging results/findings within the past 24 hours.  Imaging Results              CTA Abdomen (Final result)  Result time 09/25/24 21:24:28      Final result by Kurtis Segundo IV, MD (09/25/24 21:24:28)                   Impression:      1. Better evaluation of the colon was performed on the prior exam dated the same.  There is however still noted to be wall thickening involving the descending colon and colon at the splenic flexure in particular with significant ascites in the abdomen.  Please correlate for etiologies.  Bowel wall thickening can relate to inflammatory bowel disease, colitis, diverticulitis as well as ischemic change  2. The celiac artery, SMA, and CAITY appear predominantly patent proximal without obvious stenosis, aneurysm, or occlusion identified.  The patient has a less atherosclerotic change than would be expected for a patient of this age.  3. 4 mm pulmonary nodule new since 06/16/2022.  Follow-up in 6 months to a year for size stability would be suggested.  4. Moderate size hiatal hernia  5. Hepatic hypodensities statistically favored relate to cysts without worrisome characteristics some are too small to ideally categorize the but without worrisome characteristics the majority stable since at least 01/16/2024  6. Intrahepatic biliary prominence with evidence of cholecystectomy.  Correlation with biliary enzymes may be of use to exclude a distal obstructive process.      Electronically signed by: Kurtis Segundo MD  Date:    09/25/2024  Time:    21:24               Narrative:    EXAMINATION:  CTA ABDOMEN    CLINICAL HISTORY:  recurrent ischemic colitis;    TECHNIQUE:  CTA imaging was obtained from the level lung bases to the level of the iliac wings with utilization of 100 cc of Omni 350.    COMPARISON:  A CT from earlier in the day was  available    FINDINGS:  A 4 mm pulmonary nodule is noted in the left lower lobe image 12 sequence 4.  This is favored to be new since 06/16/2022.  Continued follow-up in 6 months to 1 year for size stability would be suggested.    A stable 4 mm nodule is noted left lower lobe image 25 sequence 4.    Some band like changes are noted in the lower lobes bilaterally suggesting atelectasis or scarring.    Extensive intra-abdominal ascites is noted similar since the prior exam of today but progressed significantly since 09/08/2024.    A hiatal hernia appears to be present.  The stomach is not well distended or evaluated.  Given the lack of distension excluding wall thickening is not possible.    Spleen demonstrates heterogeneous enhancement on today's exam favored relate to contrast phase    Contrast distension of the bladder is noted.    Colonic wall thickening is still suspected involving the descending colon and transverse colon near the splenic flexure which is better displayed on the prior exam from today.    A nonobstructive calcification is noted at the lower pole the left kidney suggestive a stone of mm.    The celiac artery has its origin at table position the 114 and appears patent proximally.  This appears to feed the splenic artery alone    The hepatic artery appears to arise from the SMA.  SMA has its origin at table position 138 and appears patent proximally.  The SMA proximal branches appear patent without evidence of significant stenosis noted on today's exam.  The inferior mesenteric artery has its origin at 196 and appears patent proximally.  It cannot be followed to its pelvic anastomoses but no worrisome occlusion is identified    A proximal branching or 2 adjacent left renal arteries are noted with origin at table position 141.  The appear patent proximally.  The right renal artery has its origin table position 151 and appears patent proximally.  The aorta does not appear to demonstrate significant  atherosclerotic change.  A    Calcification of the mitral valve appears to be present which could relate to prior inflammation or procedure    Hepatic hypodensities are noted without worrisome characteristics better displayed on the prior of today given the contrast phase too small to categorize but without worrisome characteristics statistically favored relate to cysts and appearing relatively stable since 01/16/2024    Intrahepatic biliary prominence is noted prior exam dated the same that appears new since 01/16/2024.  Interval cholecystectomy has been performed.  The common appears prominent which likely relates to cholecystectomy.  Correlation with biliary enzymes is suggested    Significant levocurvature is noted to the lumbar spine.                                        CT Abdomen Pelvis With IV Contrast NO Oral Contrast (Final result)  Result time 09/25/24 05:14:31      Final result by Bill Valadez MD (09/25/24 05:14:31)                   Impression:      Circumferential wall thickening involving the left colon and rectosigmoid colon as well as a segment of the hepatic flexure of the right colon, correlation for colitis is needed.    There is mild to moderate distention along the transverse colon with moderate to prominent distention of the right colon, with stool, potentially secondary to partial obstructive change associated with the aforementioned findings of suspected colitis.    Multiple dilated small bowel loops in the right lower abdomen, these may potentially relate to the colonic distention with stool however if there is persistent or worsening symptomatology additional follow-up to exclude developing obstruction is recommended.    Mild wall and fold thickening of the stomach, correlation for gastritis is needed.    Interval cholecystectomy, biliary dilatation may relate to passive biliary dilatation of prior cholecystectomy, clinical correlation otherwise needed.    Mild fluid at the  gallbladder fossa does not appear to represent a walled off fluid collection and may relate to mild free fluid of the abdomen and pelvis.    Fluid collection associated with the postoperative left hip as discussed above.    Additional findings as above.    This report was flagged in Epic as abnormal.      Electronically signed by: Bill Valadez  Date:    09/25/2024  Time:    05:14               Narrative:    EXAMINATION:  CT ABDOMEN PELVIS WITH IV CONTRAST    CLINICAL HISTORY:  RLQ abdominal pain (Age >= 14y);    TECHNIQUE:  Low dose axial images, sagittal and coronal reformations were obtained from the lung bases to the pubic symphysis following the IV administration of 100 mL of Omnipaque 350 oral contrast was not utilized.  Single phase postcontrast CT examination of the abdomen and pelvis is submitted.    COMPARISON:  CT examination of the abdomen and pelvis September 8, 2024    FINDINGS:  Artifact is present, this diminishes the sensitivity of the examination.  The visualized lung bases demonstrate mild motion artifact and mild atelectatic change.  There is a small to moderate hiatal hernia noted.  Gastric wall and fold thickening is noted, nonspecific, seen as an interval change, correlation for gastritis is needed.    The gallbladder is surgically absent, appearing to have been resected in the interim since the prior study.  There is mild fluid seen at the gallbladder fossa however without a thick-walled configuration.  This may relate to the appearance of mild free fluid throughout the abdomen and pelvis..  There is biliary dilatation, this may relate to passive biliary dilatation of prior cholecystectomy, clinical and historical correlation is otherwise needed.    There is no evidence for peripancreatic inflammatory change and no evidence for pancreatic ductal dilatation.  There is no additional evidence for acute process of the liver, pancreas, spleen or adrenal glands, adrenal gland thickening is again  noted appearing stable.    Nonobstructing nephrolithiasis of the left kidney is noted.  There is no evidence for ureteral calculus or obstructive uropathy or perinephric inflammatory change bilaterally.  The abdominal aorta appears normal in caliber, demonstrates appropriate opacification.  The urinary bladder appears unremarkable for degree of distention.    There is appearance of circumferential wall thickening involving the hepatic flexure and descending colon and rectosigmoid colon, correlation for colitis is needed.  These segments of the colon demonstrate mild air and stool within the lumen however predominantly decompressed.  There is mild-to-moderate distention of the transverse colon with moderate to prominent distention of the right colon with stool.  There is mild circumferential thickening along the hepatic flexure, correlation for colitis is needed.    There is a structure consistent with the appendix, difficult to delineate in its entirety, primary appendiceal inflammatory process is not thought present.    There are several dilated small bowel loops in the right lower abdomen, it is possible these small bowel loops are dilated due to the distention of the right colon with stool, however if there is persistent or worsening symptomatology additional follow-up to exclude developing obstruction is recommended.  There is no evidence for free intraperitoneal air.  There is no evidence for pneumatosis or portal venous air or mesenteric venous air.    The osseous structures demonstrate chronic change, there is scoliotic curvature of the spine, postoperative change of the spine is noted.  Left hip replacement noted, there is associated artifact.  There is appearance posterior to the left hip of variable attenuation diminished attenuation measuring up to approximately 6.1 x 1.8 cm on axial image 188, this may relate to a fluid collection, this may relate to a seroma or hematoma, however does not appear acute.   Correlation for signs or symptoms of infection is otherwise needed.                                       X-Ray Chest AP Portable (Final result)  Result time 09/25/24 04:24:36      Final result by Bill Valadez MD (09/25/24 04:24:36)                   Impression:      Diminished depth of inspiration and elevation of the right hemidiaphragm, and mild atelectatic change noted, there is no additional radiographic evidence for acute intrathoracic process.      Electronically signed by: Bill Valadez  Date:    09/25/2024  Time:    04:24               Narrative:    EXAMINATION:  XR CHEST AP PORTABLE    CLINICAL HISTORY:  Chest pain, unspecified    TECHNIQUE:  Single frontal view of the chest was performed.    COMPARISON:  Chest radiograph September 8, 2024    FINDINGS:  Single portable chest view is submitted.  There is diminished depth of inspiration and elevation of the right hemidiaphragm and minimal rotation, when accounting for position and technique and depth of inspiration the appearance of the cardiomediastinal silhouette is stable.    Mild atelectatic change noted.  There is no evidence for superimposed confluent infiltrate or consolidation, significant pleural effusion or pneumothorax.    The osseous structures demonstrate chronic change.                                  MRI MRCP Abdomen WO Contrast 3D WO Independent WS XPD  Order: [id number]  Status: Final result       Visible to patient: No (inaccessible in MyChart)       Next appt: 10/01/2024 at 02:00 PM in Surgery (Cipriano Ambrosio MD)    0 Result Notes  Details    Reading Physician Reading Date Result Priority   Mekhi Kwon MD  [phone] 9/26/2024 Routine     Narrative & Impression  EXAMINATION:  MRI MRCP ABDOMEN WO CONTRAST 3D WO INDEPENDENT WS XPD     CLINICAL HISTORY:  Biliary obstruction suspected (Ped 0-18y);     TECHNIQUE:  Multiplanar imaging through the abdomen including heavily T2 weighted slab coronal imaging was performed, with  maximum intensity projections reviewed in multiple planes.     FINDINGS:  Comparison to multiple prior exams.  The gallbladder is surgically absent, with the cystic duct remnant normal in caliber.  The intrahepatic bile ducts are normal in caliber, with no filling defects, stricturing or beading.     The common hepatic duct measures 10 mm maximum diameter, with the common bile duct measuring 9 mm maximum diameter.  There is a truncated appearance of the distal common bile duct at the level of the ampulla, nonspecific.  There are no biliary ductal filling defects to suggest choledocholithiasis.     The main pancreatic duct is normal in caliber with findings of pancreas divisum, the dorsal pancreatic duct draining through accessory papilla proximal to the ampulla.     There multiple T2 hyperintense simple hepatic cysts, with the spleen having normal size and signal intensity.  The pancreas and adrenal glands are unremarkable, with circumscribed T2 hyperintense bilateral simple renal cysts.  There is a moderate sized sliding-type hiatal hernia.  There is moderate volume of T2 hyperintense ascites in the visualized upper abdomen.     Impression:     1. Dilated common hepatic and common bile ducts, with truncated appearance of the distal common bile duct at the level of the ampulla.  When correlated with recent CT, there is question of duodenal mucosal nodularity and a mucosal based duodenal mass is not excluded.  Correlation with endoscopy is recommended.  2. Negative for choledocholithiasis.  3. Pancreas divisum.  4. Additional observations as described.        Electronically signed by:Mekhi Kwon  Date:                                            09/26/2024  Time:                                           16:18         Assessment/Plan:      * Colitis  Appears like diffuse colitis throughout - opioid -induced constipation and chronic antibiotics maybe contributing  IVF  Zosyn  No stool to culture thus far  General  surgery consulted for suspected SBO - no surgical intervention at this time  Gi consulted - appears colitis is chronic   NPO   CRP and ESr pending   Trying to avoid heavy opioids but unlikely as she is dependent on them at this point  Gastroenterology also consulted and is obtaining MRCP, further recommendations to follow.      Seizure  Resume home oral keppra  Seizure precautions       Paroxysmal atrial fibrillation  Patient with Paroxysmal (<7 days) atrial fibrillation which is controlled currently with  no home meds . Patient is currently in sinus rhythm.[id number] Score: 3. HASBLED Score: . Anticoagulation not indicated due to sinus rhythm and h/o chronic gastric ulcer with bleeds .    Drug-induced constipation  Chronic opioids         Peripheral neuropathy  Resume home meds      Essential hypertension  Chronic, controlled. Latest blood pressure and vitals reviewed-     Temp:  [98.5 °F (36.9 °C)-98.8 °F (37.1 °C)]   Pulse:  [77-92]   Resp:  [10-27]   BP: (104-182)/(55-84)   SpO2:  [93 %-99 %] .   Home meds for hypertension were reviewed and noted below.   Hypertension Medications               amlodipine-benazepril 5-20 mg (LOTREL) 5-20 mg per capsule Take 1 capsule by mouth once daily.            While in the hospital, will manage blood pressure as follows; Continue home antihypertensive regimen    Will utilize p.r.n. blood pressure medication only if patient's blood pressure greater than 160/100 and she develops symptoms such as worsening chest pain or shortness of breath.      VTE Risk Mitigation (From admission, onward)           Ordered     Reason for No Pharmacological VTE Prophylaxis  Once        Question:  Reasons:  Answer:  Already adequately anticoagulated on oral Anticoagulants    09/25/24 0823     IP VTE HIGH RISK PATIENT  Once         09/25/24 0823     Place sequential compression device  Until discontinued         09/25/24 0823                    Discharge Planning   BAILEY: 9/27/2024     Code Status:  Full Code   Is the patient medically ready for discharge?:     Reason for patient still in hospital (select all that apply): Treatment, Imaging, Consult recommendations, and Pending disposition  Discharge Plan A: Home with family                  Micah Cortes DO  Department of Hospital Medicine   [facility]

## 2024-09-26 NOTE — PROGRESS NOTES
CTA reviewed and MRCP ordered showing questionable duodenal abnormality.  Elevated alk phos.  D/w nursing, hospitalist and dr. Nguyễn with plans for EUS tomorrow

## 2024-09-26 NOTE — NURSING
Hospital MD informed of unrelieved abdominal pain rated 10/10 after PRN morphine administration. New orders placed.

## 2024-09-26 NOTE — PLAN OF CARE
Pt. A&O x 4 resp. Even and unlabored no S/S of acute distress call light within reach  Problem: Adult Inpatient Plan of Care  Goal: Plan of Care Review  Outcome: Progressing  Goal: Patient-Specific Goal (Individualized)  Outcome: Progressing  Goal: Absence of Hospital-Acquired Illness or Injury  Outcome: Progressing  Goal: Optimal Comfort and Wellbeing  Outcome: Progressing  Goal: Readiness for Transition of Care  Outcome: Progressing

## 2024-09-26 NOTE — HOSPITAL COURSE
78-year-old woman with a history of recent cholecystectomy presents for evaluation of right upper and lower quadrant abdominal pain.  CT abd/pelv and CTA abd as below. Only briefly given abx. General surgery consulted and they do not believe there is any role for surgical intervention.  Also evaluated by Gastroenterology. Underwent MRCP 9/26 which showed dilated common hepatic and common bile ducts, with truncated appearance of the distal common bile duct at the level of the ampulla and a possible duodenal mass.  Patient taken for EGD and EUS 9/27 showed erosive gastritis and multiple shallow ulcerations in duodenal bulb without any mass seen. GI recommended continue same meds, follow up outpatient for colonoscopy, and consider ERCP only if pain w/ LFTs elevated as biliary dilation has been present since EUS on '20. Given bentyl and laxatives. She had bowel movements here. To f/u outpatient PCP and GI. By time of discharge the patient was tolerating a regular diet with resolving admission symptoms. Patient seen and examined on day of discharge.    Physical exam on day of discharge:  General - Patient alert and oriented x3 in NAD  CV - Regular rate and rhythm   Resp -  No increased WOB  GI -  soft, non-tender/non-distended    Skin -  No masses, rashes or lesions noted on cursory skin exam.

## 2024-09-26 NOTE — SUBJECTIVE & OBJECTIVE
Interval History:  Patient seen and examined abdominal pain comes and goes.    Review of Systems   Constitutional:  Positive for activity change and appetite change.   Respiratory: Negative.     Cardiovascular: Negative.    Gastrointestinal:  Positive for abdominal pain, constipation and nausea. Negative for abdominal distention, blood in stool, diarrhea and vomiting.   Genitourinary: Negative.    Musculoskeletal:  Positive for arthralgias and gait problem.   Skin: Negative.    Neurological:  Positive for weakness.   Hematological:  Bruises/bleeds easily.   Psychiatric/Behavioral:  Negative for confusion.      Objective:     Vital Signs (Most Recent):  Temp: 98.3 °F (36.8 °C) (09/26/24 1517)  Pulse: 83 (09/26/24 1517)  Resp: 16 (09/26/24 1517)  BP: 117/65 (09/26/24 1517)  SpO2: (!) 92 % (09/26/24 1517) Vital Signs (24h Range):  Temp:  [97.9 °F (36.6 °C)-98.3 °F (36.8 °C)] 98.3 °F (36.8 °C)  Pulse:  [78-87] 83  Resp:  [16-18] 16  SpO2:  [92 %-96 %] 92 %  BP: (111-159)/(62-77) 117/65     Weight: 59.8 kg (131 lb 13.4 oz)  Body mass index is 21.28 kg/m².    Intake/Output Summary (Last 24 hours) at 9/26/2024 1649  Last data filed at 9/26/2024 1535  Gross per 24 hour   Intake 560 ml   Output --   Net 560 ml         Physical Exam  Constitutional:       Comments: Lethargic but arousable    HENT:      Head: Normocephalic and atraumatic.      Mouth/Throat:      Mouth: Mucous membranes are dry.      Pharynx: Oropharynx is clear.   Cardiovascular:      Rate and Rhythm: Normal rate and regular rhythm.      Pulses: Normal pulses.   Pulmonary:      Effort: Pulmonary effort is normal. No respiratory distress.      Breath sounds: No wheezing.   Abdominal:      General: Bowel sounds are normal. There is no distension.      Palpations: Abdomen is soft.      Tenderness: There is abdominal tenderness. There is no guarding.      Comments: Bruising across upper abdominal wall    Musculoskeletal:         General: Signs of injury present.  No swelling.      Cervical back: Normal range of motion and neck supple.   Skin:     General: Skin is warm and dry.      Capillary Refill: Capillary refill takes less than 2 seconds.      Coloration: Skin is pale.   Neurological:      General: No focal deficit present.      Cranial Nerves: No cranial nerve deficit.      Motor: Weakness present.             Significant Labs: All pertinent labs within the past 24 hours have been reviewed.  Recent Lab Results         09/26/24  0430        Albumin 3.2              ALT 22       Anion Gap 6       AST 24       Baso # 0.06       Basophil % 0.9       BILIRUBIN TOTAL 0.9  Comment: For infants and newborns, interpretation of results should be based  on gestational age, weight and in agreement with clinical  observations.    Premature Infant recommended reference ranges:  Up to 24 hours.............<8.0 mg/dL  Up to 48 hours............<12.0 mg/dL  3-5 days..................<15.0 mg/dL  6-29 days.................<15.0 mg/dL         BUN 12       Calcium 8.8       Chloride 102       CO2 27       Creatinine 0.7       Differential Method Automated       eGFR >60.0       Eos # 1.3       Eos % 19.4       Glucose 112       Gran # (ANC) 4.2       Gran % 61.1       Hematocrit 28.2       Hemoglobin 8.4       Immature Grans (Abs) 0.03  Comment: Mild elevation in immature granulocytes is non specific and   can be seen in a variety of conditions including stress response,   acute inflammation, trauma and pregnancy. Correlation with other   laboratory and clinical findings is essential.         Immature Granulocytes 0.4       Lymph # 0.8       Lymph % 12.1       Magnesium  2.0       MCH 26.6       MCHC 29.8       MCV 89       Mono # 0.4       Mono % 6.1       MPV 9.5       nRBC 0       Platelet Count 435       Potassium 4.5       PROTEIN TOTAL 5.7       RBC 3.16       RDW 14.3       Sodium 135       WBC 6.84               Significant Imaging: I have reviewed all pertinent imaging  results/findings within the past 24 hours.  Imaging Results              CTA Abdomen (Final result)  Result time 09/25/24 21:24:28      Final result by Kurtis Segundo IV, MD (09/25/24 21:24:28)                   Impression:      1. Better evaluation of the colon was performed on the prior exam dated the same.  There is however still noted to be wall thickening involving the descending colon and colon at the splenic flexure in particular with significant ascites in the abdomen.  Please correlate for etiologies.  Bowel wall thickening can relate to inflammatory bowel disease, colitis, diverticulitis as well as ischemic change  2. The celiac artery, SMA, and CAITY appear predominantly patent proximal without obvious stenosis, aneurysm, or occlusion identified.  The patient has a less atherosclerotic change than would be expected for a patient of this age.  3. 4 mm pulmonary nodule new since 06/16/2022.  Follow-up in 6 months to a year for size stability would be suggested.  4. Moderate size hiatal hernia  5. Hepatic hypodensities statistically favored relate to cysts without worrisome characteristics some are too small to ideally categorize the but without worrisome characteristics the majority stable since at least 01/16/2024  6. Intrahepatic biliary prominence with evidence of cholecystectomy.  Correlation with biliary enzymes may be of use to exclude a distal obstructive process.      Electronically signed by: Kurtis Segundo MD  Date:    09/25/2024  Time:    21:24               Narrative:    EXAMINATION:  CTA ABDOMEN    CLINICAL HISTORY:  recurrent ischemic colitis;    TECHNIQUE:  CTA imaging was obtained from the level lung bases to the level of the iliac wings with utilization of 100 cc of Omni 350.    COMPARISON:  A CT from earlier in the day was available    FINDINGS:  A 4 mm pulmonary nodule is noted in the left lower lobe image 12 sequence 4.  This is favored to be new since 06/16/2022.  Continued follow-up in  6 months to 1 year for size stability would be suggested.    A stable 4 mm nodule is noted left lower lobe image 25 sequence 4.    Some band like changes are noted in the lower lobes bilaterally suggesting atelectasis or scarring.    Extensive intra-abdominal ascites is noted similar since the prior exam of today but progressed significantly since 09/08/2024.    A hiatal hernia appears to be present.  The stomach is not well distended or evaluated.  Given the lack of distension excluding wall thickening is not possible.    Spleen demonstrates heterogeneous enhancement on today's exam favored relate to contrast phase    Contrast distension of the bladder is noted.    Colonic wall thickening is still suspected involving the descending colon and transverse colon near the splenic flexure which is better displayed on the prior exam from today.    A nonobstructive calcification is noted at the lower pole the left kidney suggestive a stone of mm.    The celiac artery has its origin at table position the 114 and appears patent proximally.  This appears to feed the splenic artery alone    The hepatic artery appears to arise from the SMA.  SMA has its origin at table position 138 and appears patent proximally.  The SMA proximal branches appear patent without evidence of significant stenosis noted on today's exam.  The inferior mesenteric artery has its origin at 196 and appears patent proximally.  It cannot be followed to its pelvic anastomoses but no worrisome occlusion is identified    A proximal branching or 2 adjacent left renal arteries are noted with origin at table position 141.  The appear patent proximally.  The right renal artery has its origin table position 151 and appears patent proximally.  The aorta does not appear to demonstrate significant atherosclerotic change.  A    Calcification of the mitral valve appears to be present which could relate to prior inflammation or procedure    Hepatic hypodensities are  noted without worrisome characteristics better displayed on the prior of today given the contrast phase too small to categorize but without worrisome characteristics statistically favored relate to cysts and appearing relatively stable since 01/16/2024    Intrahepatic biliary prominence is noted prior exam dated the same that appears new since 01/16/2024.  Interval cholecystectomy has been performed.  The common appears prominent which likely relates to cholecystectomy.  Correlation with biliary enzymes is suggested    Significant levocurvature is noted to the lumbar spine.                                        CT Abdomen Pelvis With IV Contrast NO Oral Contrast (Final result)  Result time 09/25/24 05:14:31      Final result by Bill Valadez MD (09/25/24 05:14:31)                   Impression:      Circumferential wall thickening involving the left colon and rectosigmoid colon as well as a segment of the hepatic flexure of the right colon, correlation for colitis is needed.    There is mild to moderate distention along the transverse colon with moderate to prominent distention of the right colon, with stool, potentially secondary to partial obstructive change associated with the aforementioned findings of suspected colitis.    Multiple dilated small bowel loops in the right lower abdomen, these may potentially relate to the colonic distention with stool however if there is persistent or worsening symptomatology additional follow-up to exclude developing obstruction is recommended.    Mild wall and fold thickening of the stomach, correlation for gastritis is needed.    Interval cholecystectomy, biliary dilatation may relate to passive biliary dilatation of prior cholecystectomy, clinical correlation otherwise needed.    Mild fluid at the gallbladder fossa does not appear to represent a walled off fluid collection and may relate to mild free fluid of the abdomen and pelvis.    Fluid collection associated with  the postoperative left hip as discussed above.    Additional findings as above.    This report was flagged in Epic as abnormal.      Electronically signed by: Bill Valadez  Date:    09/25/2024  Time:    05:14               Narrative:    EXAMINATION:  CT ABDOMEN PELVIS WITH IV CONTRAST    CLINICAL HISTORY:  RLQ abdominal pain (Age >= 14y);    TECHNIQUE:  Low dose axial images, sagittal and coronal reformations were obtained from the lung bases to the pubic symphysis following the IV administration of 100 mL of Omnipaque 350 oral contrast was not utilized.  Single phase postcontrast CT examination of the abdomen and pelvis is submitted.    COMPARISON:  CT examination of the abdomen and pelvis September 8, 2024    FINDINGS:  Artifact is present, this diminishes the sensitivity of the examination.  The visualized lung bases demonstrate mild motion artifact and mild atelectatic change.  There is a small to moderate hiatal hernia noted.  Gastric wall and fold thickening is noted, nonspecific, seen as an interval change, correlation for gastritis is needed.    The gallbladder is surgically absent, appearing to have been resected in the interim since the prior study.  There is mild fluid seen at the gallbladder fossa however without a thick-walled configuration.  This may relate to the appearance of mild free fluid throughout the abdomen and pelvis..  There is biliary dilatation, this may relate to passive biliary dilatation of prior cholecystectomy, clinical and historical correlation is otherwise needed.    There is no evidence for peripancreatic inflammatory change and no evidence for pancreatic ductal dilatation.  There is no additional evidence for acute process of the liver, pancreas, spleen or adrenal glands, adrenal gland thickening is again noted appearing stable.    Nonobstructing nephrolithiasis of the left kidney is noted.  There is no evidence for ureteral calculus or obstructive uropathy or perinephric  inflammatory change bilaterally.  The abdominal aorta appears normal in caliber, demonstrates appropriate opacification.  The urinary bladder appears unremarkable for degree of distention.    There is appearance of circumferential wall thickening involving the hepatic flexure and descending colon and rectosigmoid colon, correlation for colitis is needed.  These segments of the colon demonstrate mild air and stool within the lumen however predominantly decompressed.  There is mild-to-moderate distention of the transverse colon with moderate to prominent distention of the right colon with stool.  There is mild circumferential thickening along the hepatic flexure, correlation for colitis is needed.    There is a structure consistent with the appendix, difficult to delineate in its entirety, primary appendiceal inflammatory process is not thought present.    There are several dilated small bowel loops in the right lower abdomen, it is possible these small bowel loops are dilated due to the distention of the right colon with stool, however if there is persistent or worsening symptomatology additional follow-up to exclude developing obstruction is recommended.  There is no evidence for free intraperitoneal air.  There is no evidence for pneumatosis or portal venous air or mesenteric venous air.    The osseous structures demonstrate chronic change, there is scoliotic curvature of the spine, postoperative change of the spine is noted.  Left hip replacement noted, there is associated artifact.  There is appearance posterior to the left hip of variable attenuation diminished attenuation measuring up to approximately 6.1 x 1.8 cm on axial image 188, this may relate to a fluid collection, this may relate to a seroma or hematoma, however does not appear acute.  Correlation for signs or symptoms of infection is otherwise needed.                                       X-Ray Chest AP Portable (Final result)  Result time 09/25/24  04:24:36      Final result by Bill Valadez MD (09/25/24 04:24:36)                   Impression:      Diminished depth of inspiration and elevation of the right hemidiaphragm, and mild atelectatic change noted, there is no additional radiographic evidence for acute intrathoracic process.      Electronically signed by: Bill Valadez  Date:    09/25/2024  Time:    04:24               Narrative:    EXAMINATION:  XR CHEST AP PORTABLE    CLINICAL HISTORY:  Chest pain, unspecified    TECHNIQUE:  Single frontal view of the chest was performed.    COMPARISON:  Chest radiograph September 8, 2024    FINDINGS:  Single portable chest view is submitted.  There is diminished depth of inspiration and elevation of the right hemidiaphragm and minimal rotation, when accounting for position and technique and depth of inspiration the appearance of the cardiomediastinal silhouette is stable.    Mild atelectatic change noted.  There is no evidence for superimposed confluent infiltrate or consolidation, significant pleural effusion or pneumothorax.    The osseous structures demonstrate chronic change.                                  MRI MRCP Abdomen WO Contrast 3D WO Independent WS XPD  Order: [id number]  Status: Final result       Visible to patient: No (inaccessible in MyChart)       Next appt: 10/01/2024 at 02:00 PM in Surgery (Cipriano Ambrosio MD)    0 Result Notes  Details    Reading Physician Reading Date Result Priority   Mekhi Kwon MD  [phone] 9/26/2024 Routine     Narrative & Impression  EXAMINATION:  MRI MRCP ABDOMEN WO CONTRAST 3D WO INDEPENDENT WS XPD     CLINICAL HISTORY:  Biliary obstruction suspected (Ped 0-18y);     TECHNIQUE:  Multiplanar imaging through the abdomen including heavily T2 weighted slab coronal imaging was performed, with maximum intensity projections reviewed in multiple planes.     FINDINGS:  Comparison to multiple prior exams.  The gallbladder is surgically absent, with the cystic duct  remnant normal in caliber.  The intrahepatic bile ducts are normal in caliber, with no filling defects, stricturing or beading.     The common hepatic duct measures 10 mm maximum diameter, with the common bile duct measuring 9 mm maximum diameter.  There is a truncated appearance of the distal common bile duct at the level of the ampulla, nonspecific.  There are no biliary ductal filling defects to suggest choledocholithiasis.     The main pancreatic duct is normal in caliber with findings of pancreas divisum, the dorsal pancreatic duct draining through accessory papilla proximal to the ampulla.     There multiple T2 hyperintense simple hepatic cysts, with the spleen having normal size and signal intensity.  The pancreas and adrenal glands are unremarkable, with circumscribed T2 hyperintense bilateral simple renal cysts.  There is a moderate sized sliding-type hiatal hernia.  There is moderate volume of T2 hyperintense ascites in the visualized upper abdomen.     Impression:     1. Dilated common hepatic and common bile ducts, with truncated appearance of the distal common bile duct at the level of the ampulla.  When correlated with recent CT, there is question of duodenal mucosal nodularity and a mucosal based duodenal mass is not excluded.  Correlation with endoscopy is recommended.  2. Negative for choledocholithiasis.  3. Pancreas divisum.  4. Additional observations as described.        Electronically signed by:Mekhi Kwon  Date:                                            09/26/2024  Time:                                           16:18

## 2024-09-26 NOTE — PLAN OF CARE
Problem: Oral Intake Inadequate  Goal: Improved Oral Intake  Outcome: Progressing  Intervention: Promote and Optimize Oral Intake  Flowsheets (Taken 9/26/2024 1719)  Nutrition Interventions:   supplemental drinks provided   diet liberalized

## 2024-09-27 ENCOUNTER — ANESTHESIA EVENT (OUTPATIENT)
Dept: SURGERY | Facility: HOSPITAL | Age: 79
End: 2024-09-27
Payer: MEDICARE

## 2024-09-27 ENCOUNTER — ANESTHESIA (OUTPATIENT)
Dept: SURGERY | Facility: HOSPITAL | Age: 79
End: 2024-09-27
Payer: MEDICARE

## 2024-09-27 LAB
ALBUMIN SERPL BCP-MCNC: 3.2 G/DL (ref 3.5–5.2)
ALP SERPL-CCNC: 345 U/L (ref 55–135)
ALT SERPL W/O P-5'-P-CCNC: 17 U/L (ref 10–44)
ANION GAP SERPL CALC-SCNC: 7 MMOL/L (ref 8–16)
AST SERPL-CCNC: 15 U/L (ref 10–40)
BASOPHILS # BLD AUTO: 0.06 K/UL (ref 0–0.2)
BASOPHILS NFR BLD: 1 % (ref 0–1.9)
BILIRUB SERPL-MCNC: 0.8 MG/DL (ref 0.1–1)
BUN SERPL-MCNC: 20 MG/DL (ref 8–23)
CALCIUM SERPL-MCNC: 8.7 MG/DL (ref 8.7–10.5)
CHLORIDE SERPL-SCNC: 104 MMOL/L (ref 95–110)
CO2 SERPL-SCNC: 27 MMOL/L (ref 23–29)
CREAT SERPL-MCNC: 0.7 MG/DL (ref 0.5–1.4)
DIFFERENTIAL METHOD BLD: ABNORMAL
EOSINOPHIL # BLD AUTO: 1.2 K/UL (ref 0–0.5)
EOSINOPHIL NFR BLD: 19.7 % (ref 0–8)
ERYTHROCYTE [DISTWIDTH] IN BLOOD BY AUTOMATED COUNT: 14.1 % (ref 11.5–14.5)
EST. GFR  (NO RACE VARIABLE): >60 ML/MIN/1.73 M^2
GLUCOSE SERPL-MCNC: 101 MG/DL (ref 70–110)
HCT VFR BLD AUTO: 29.3 % (ref 37–48.5)
HGB BLD-MCNC: 9 G/DL (ref 12–16)
IMM GRANULOCYTES # BLD AUTO: 0.02 K/UL (ref 0–0.04)
IMM GRANULOCYTES NFR BLD AUTO: 0.3 % (ref 0–0.5)
INR PPP: 1 (ref 0.8–1.2)
LYMPHOCYTES # BLD AUTO: 1.2 K/UL (ref 1–4.8)
LYMPHOCYTES NFR BLD: 19.9 % (ref 18–48)
MAGNESIUM SERPL-MCNC: 2.1 MG/DL (ref 1.6–2.6)
MCH RBC QN AUTO: 26.5 PG (ref 27–31)
MCHC RBC AUTO-ENTMCNC: 30.7 G/DL (ref 32–36)
MCV RBC AUTO: 86 FL (ref 82–98)
MONOCYTES # BLD AUTO: 0.5 K/UL (ref 0.3–1)
MONOCYTES NFR BLD: 7.7 % (ref 4–15)
NEUTROPHILS # BLD AUTO: 3.1 K/UL (ref 1.8–7.7)
NEUTROPHILS NFR BLD: 51.4 % (ref 38–73)
NRBC BLD-RTO: 0 /100 WBC
PLATELET # BLD AUTO: 451 K/UL (ref 150–450)
PMV BLD AUTO: 9.2 FL (ref 9.2–12.9)
POTASSIUM SERPL-SCNC: 4.6 MMOL/L (ref 3.5–5.1)
PROT SERPL-MCNC: 5.8 G/DL (ref 6–8.4)
PROTHROMBIN TIME: 10.7 SEC (ref 9–12.5)
RBC # BLD AUTO: 3.39 M/UL (ref 4–5.4)
SODIUM SERPL-SCNC: 138 MMOL/L (ref 136–145)
WBC # BLD AUTO: 6.08 K/UL (ref 3.9–12.7)

## 2024-09-27 PROCEDURE — 37000008 HC ANESTHESIA 1ST 15 MINUTES: Performed by: INTERNAL MEDICINE

## 2024-09-27 PROCEDURE — 25000003 PHARM REV CODE 250: Performed by: INTERNAL MEDICINE

## 2024-09-27 PROCEDURE — 85025 COMPLETE CBC W/AUTO DIFF WBC: CPT | Performed by: HOSPITALIST

## 2024-09-27 PROCEDURE — 25000003 PHARM REV CODE 250: Performed by: STUDENT IN AN ORGANIZED HEALTH CARE EDUCATION/TRAINING PROGRAM

## 2024-09-27 PROCEDURE — 88305 TISSUE EXAM BY PATHOLOGIST: CPT | Mod: TC | Performed by: PATHOLOGY

## 2024-09-27 PROCEDURE — 25000003 PHARM REV CODE 250: Performed by: HOSPITALIST

## 2024-09-27 PROCEDURE — 63600175 PHARM REV CODE 636 W HCPCS: Performed by: STUDENT IN AN ORGANIZED HEALTH CARE EDUCATION/TRAINING PROGRAM

## 2024-09-27 PROCEDURE — 80053 COMPREHEN METABOLIC PANEL: CPT | Performed by: HOSPITALIST

## 2024-09-27 PROCEDURE — 37000009 HC ANESTHESIA EA ADD 15 MINS: Performed by: INTERNAL MEDICINE

## 2024-09-27 PROCEDURE — 85610 PROTHROMBIN TIME: CPT | Performed by: HOSPITALIST

## 2024-09-27 PROCEDURE — 83735 ASSAY OF MAGNESIUM: CPT | Performed by: HOSPITALIST

## 2024-09-27 PROCEDURE — 25000003 PHARM REV CODE 250: Performed by: FAMILY MEDICINE

## 2024-09-27 PROCEDURE — 43259 EGD US EXAM DUODENUM/JEJUNUM: CPT | Performed by: INTERNAL MEDICINE

## 2024-09-27 PROCEDURE — 0DB68ZX EXCISION OF STOMACH, VIA NATURAL OR ARTIFICIAL OPENING ENDOSCOPIC, DIAGNOSTIC: ICD-10-PCS | Performed by: INTERNAL MEDICINE

## 2024-09-27 PROCEDURE — 27200043 HC FORCEPS, BIOPSY: Performed by: INTERNAL MEDICINE

## 2024-09-27 PROCEDURE — 43239 EGD BIOPSY SINGLE/MULTIPLE: CPT | Performed by: INTERNAL MEDICINE

## 2024-09-27 PROCEDURE — 36415 COLL VENOUS BLD VENIPUNCTURE: CPT | Performed by: HOSPITALIST

## 2024-09-27 PROCEDURE — BF4CZZZ ULTRASONOGRAPHY OF HEPATOBILIARY SYSTEM, ALL: ICD-10-PCS | Performed by: INTERNAL MEDICINE

## 2024-09-27 PROCEDURE — 21400001 HC TELEMETRY ROOM

## 2024-09-27 PROCEDURE — 25000003 PHARM REV CODE 250: Performed by: NURSE ANESTHETIST, CERTIFIED REGISTERED

## 2024-09-27 RX ORDER — OXYCODONE AND ACETAMINOPHEN 10; 325 MG/1; MG/1
1 TABLET ORAL EVERY 4 HOURS PRN
Status: DISCONTINUED | OUTPATIENT
Start: 2024-09-27 | End: 2024-09-28 | Stop reason: HOSPADM

## 2024-09-27 RX ORDER — BENAZEPRIL HYDROCHLORIDE 20 MG/1
20 TABLET ORAL DAILY
Status: DISCONTINUED | OUTPATIENT
Start: 2024-09-28 | End: 2024-09-27

## 2024-09-27 RX ORDER — MUPIROCIN 20 MG/G
OINTMENT TOPICAL 2 TIMES DAILY
Status: DISCONTINUED | OUTPATIENT
Start: 2024-09-27 | End: 2024-09-28 | Stop reason: HOSPADM

## 2024-09-27 RX ORDER — AMLODIPINE BESYLATE 5 MG/1
5 TABLET ORAL DAILY
Status: DISCONTINUED | OUTPATIENT
Start: 2024-09-27 | End: 2024-09-28 | Stop reason: HOSPADM

## 2024-09-27 RX ORDER — PROPOFOL 10 MG/ML
VIAL (ML) INTRAVENOUS
Status: DISCONTINUED | OUTPATIENT
Start: 2024-09-27 | End: 2024-09-27

## 2024-09-27 RX ORDER — ACETAMINOPHEN 10 MG/ML
1000 INJECTION, SOLUTION INTRAVENOUS ONCE
Status: DISCONTINUED | OUTPATIENT
Start: 2024-09-27 | End: 2024-09-28 | Stop reason: HOSPADM

## 2024-09-27 RX ORDER — MORPHINE SULFATE 2 MG/ML
2 INJECTION, SOLUTION INTRAMUSCULAR; INTRAVENOUS EVERY 6 HOURS PRN
Status: DISCONTINUED | OUTPATIENT
Start: 2024-09-27 | End: 2024-09-28 | Stop reason: HOSPADM

## 2024-09-27 RX ORDER — PANTOPRAZOLE SODIUM 40 MG/1
40 TABLET, DELAYED RELEASE ORAL DAILY
Status: DISCONTINUED | OUTPATIENT
Start: 2024-09-27 | End: 2024-09-28 | Stop reason: HOSPADM

## 2024-09-27 RX ORDER — BENAZEPRIL HYDROCHLORIDE 20 MG/1
20 TABLET ORAL DAILY
Status: DISCONTINUED | OUTPATIENT
Start: 2024-09-27 | End: 2024-09-28 | Stop reason: HOSPADM

## 2024-09-27 RX ORDER — AMLODIPINE AND BENAZEPRIL HYDROCHLORIDE 5; 20 MG/1; MG/1
1 CAPSULE ORAL DAILY
Status: DISCONTINUED | OUTPATIENT
Start: 2024-09-27 | End: 2024-09-27

## 2024-09-27 RX ORDER — AMLODIPINE BESYLATE 5 MG/1
5 TABLET ORAL DAILY
Status: DISCONTINUED | OUTPATIENT
Start: 2024-09-28 | End: 2024-09-27

## 2024-09-27 RX ADMIN — KETOROLAC TROMETHAMINE 15 MG: 30 INJECTION, SOLUTION INTRAMUSCULAR; INTRAVENOUS at 12:09

## 2024-09-27 RX ADMIN — Medication 40 MG: at 02:09

## 2024-09-27 RX ADMIN — OXYCODONE HYDROCHLORIDE AND ACETAMINOPHEN 1 TABLET: 10; 325 TABLET ORAL at 09:09

## 2024-09-27 RX ADMIN — LEVETIRACETAM 500 MG: 500 TABLET, FILM COATED ORAL at 09:09

## 2024-09-27 RX ADMIN — DICYCLOMINE HYDROCHLORIDE 10 MG: 10 CAPSULE ORAL at 04:09

## 2024-09-27 RX ADMIN — Medication 6 MG: at 09:09

## 2024-09-27 RX ADMIN — MUPIROCIN 1 G: 20 OINTMENT TOPICAL at 09:09

## 2024-09-27 RX ADMIN — PANTOPRAZOLE SODIUM 40 MG: 40 TABLET, DELAYED RELEASE ORAL at 04:09

## 2024-09-27 RX ADMIN — AMLODIPINE BESYLATE 5 MG: 5 TABLET ORAL at 09:09

## 2024-09-27 RX ADMIN — Medication 50 MG: at 02:09

## 2024-09-27 RX ADMIN — SODIUM CHLORIDE: 0.9 INJECTION, SOLUTION INTRAVENOUS at 02:09

## 2024-09-27 RX ADMIN — OXYCODONE HYDROCHLORIDE AND ACETAMINOPHEN 1 TABLET: 10; 325 TABLET ORAL at 05:09

## 2024-09-27 RX ADMIN — ESCITALOPRAM OXALATE 20 MG: 10 TABLET ORAL at 09:09

## 2024-09-27 RX ADMIN — BENAZEPRIL HYDROCHLORIDE 20 MG: 20 TABLET ORAL at 09:09

## 2024-09-27 RX ADMIN — DICYCLOMINE HYDROCHLORIDE 10 MG: 10 CAPSULE ORAL at 09:09

## 2024-09-27 RX ADMIN — PREGABALIN 200 MG: 75 CAPSULE ORAL at 11:09

## 2024-09-27 RX ADMIN — Medication 6 MG: at 12:09

## 2024-09-27 NOTE — ASSESSMENT & PLAN NOTE
Appears like diffuse colitis throughout - opioid -induced constipation and chronic antibiotics maybe contributing  IVF  Zosyn  No stool to culture thus far  General surgery consulted for suspected SBO - no surgical intervention at this time  Gi consulted - appears colitis is chronic   NPO   CRP and ESr pending   Trying to avoid heavy opioids but unlikely as she is dependent on them at this point  Gastroenterology also consulted obtain MRCP 9/26 which showed possible duodenal mass, patient going for EGD with EUS 9/27.

## 2024-09-27 NOTE — PLAN OF CARE
Problem: Adult Inpatient Plan of Care  Goal: Plan of Care Review  Outcome: Progressing  Goal: Patient-Specific Goal (Individualized)  Outcome: Progressing  Goal: Absence of Hospital-Acquired Illness or Injury  Outcome: Progressing  Goal: Optimal Comfort and Wellbeing  Outcome: Progressing  Goal: Readiness for Transition of Care  Outcome: Progressing     Problem: Oral Intake Inadequate  Goal: Improved Oral Intake  Outcome: Progressing

## 2024-09-27 NOTE — TRANSFER OF CARE
"Anesthesia Transfer of Care Note    Patient: Froilan Ray    Procedure(s) Performed: Procedure(s) (LRB):  ULTRASOUND, UPPER GI TRACT, ENDOSCOPIC (N/A)    Patient location: GI    Anesthesia Type: general    Transport from OR: Transported from OR on 6-10 L/min O2 by face mask with adequate spontaneous ventilation    Post pain: adequate analgesia    Post assessment: no apparent anesthetic complications    Post vital signs: stable    Level of consciousness: awake    Nausea/Vomiting: no nausea/vomiting    Complications: none    Transfer of care protocol was followed    Last vitals: Visit Vitals  BP (!) 153/74   Pulse 90   Temp 36.8 °C (98.2 °F) (Tympanic)   Resp 19   Ht 5' 6" (1.676 m)   Wt 59.8 kg (131 lb 13.4 oz)   SpO2 100%   Breastfeeding No   BMI 21.28 kg/m²     "

## 2024-09-27 NOTE — ANESTHESIA PREPROCEDURE EVALUATION
09/27/2024  Froilan Ray is a 78 y.o., female.             Lab Results   Component Value Date    WBC 6.08 09/27/2024    HGB 9.0 (L) 09/27/2024    HCT 29.3 (L) 09/27/2024    MCV 86 09/27/2024     (H) 09/27/2024     BMP  Lab Results   Component Value Date     09/27/2024    K 4.6 09/27/2024     09/27/2024    CO2 27 09/27/2024    BUN 20 09/27/2024    CREATININE 0.7 09/27/2024    CALCIUM 8.7 09/27/2024    ANIONGAP 7 (L) 09/27/2024     09/27/2024     (H) 09/26/2024     (H) 09/25/2024       Results for orders placed during the hospital encounter of 01/17/24    Echo    Interpretation Summary    Left Ventricle: The left ventricle is normal in size. Normal wall thickness. There is concentric remodeling. Normal wall motion. There is low normal systolic function with a visually estimated ejection fraction of 50 - 55%. There is normal diastolic function.    Right Ventricle: Normal right ventricular cavity size. Wall thickness is normal. Right ventricle wall motion  is normal. Systolic function is normal.    Aortic Valve: There is moderate aortic valve sclerosis. There is moderate annular calcification present.    Mitral Valve: There is severe mitral annular calcification present.    Aorta: Aortic root is normal in size measuring 3.37 cm. Ascending aorta is normal measuring 3.51 cm.    Pulmonary Artery: The estimated pulmonary artery systolic pressure is 25 mmHg.    IVC/SVC: Normal venous pressure at 3 mmHg.              Pre-op Assessment    I have reviewed the Patient Summary Reports.     I have reviewed the Nursing Notes. I have reviewed the NPO Status.   I have reviewed the Medications.     Review of Systems  Anesthesia Hx:  No problems with previous Anesthesia   History of prior surgery of interest to airway management or planning: cervical fusion.         Denies Family Hx  of Anesthesia complications.    Denies Personal Hx of Anesthesia complications.                    Social:  No Alcohol Use, Non-Smoker Chronic pain with uncomplicated opioid dependence      Hematology/Oncology:    Oncology Normal    -- Anemia:               Hematology Comments: Hx of transfusion                    EENT/Dental:  EENT/Dental Normal           Cardiovascular:     Hypertension, poorly controlled    Dysrhythmias atrial fibrillation         ECG has been reviewed. Chronic heart failure with preserved ejection fraction                         Pulmonary:  Pulmonary Normal                       Renal/:  Renal/ Normal                 Hepatic/GI:   PUD, (Gastric ulcer)     Abdominal pain but No nausea or vomiting.  History of gastric ulcer with bleeding          Musculoskeletal:  Arthritis   Chronic infection of left hip, currently on antibiotics     Bone Disorders:    Osteoporosis   Spine Disorders: cervical and lumbar Disc disease, Degenerative disease and Chronic Pain           Neurological:    Neuromuscular Disease,   Seizures    Hx of spinal stimulator      Chronic Pain Syndrome   Peripheral Neuropathy (feet)                          Endocrine:  Endocrine Normal            Psych:  Psychiatric History  depression                Physical Exam  General: Well nourished, Alert, Cooperative and Oriented    Airway:  Mallampati: II   Mouth Opening: Normal  TM Distance: Normal  Tongue: Normal  Neck ROM: Normal ROM    Dental:  Periodontal disease    Chest/Lungs:  Clear to auscultation, Normal Respiratory Rate    Heart:  Rate: Normal  Rhythm: Regular Rhythm  Sounds: Normal    Musculoskeletal:  Chronic infection of left hip, currently on antibiotics      Anesthesia Plan  Type of Anesthesia, risks & benefits discussed:    Anesthesia Type: Gen Natural Airway  Intra-op Monitoring Plan: Standard ASA Monitors  Induction:  IV  Informed Consent: Informed consent signed with the Patient and all parties understand the risks  and agree with anesthesia plan.  All questions answered. Patient consented to blood products? Yes  ASA Score: 3    Ready For Surgery From Anesthesia Perspective.     .

## 2024-09-27 NOTE — ANESTHESIA POSTPROCEDURE EVALUATION
Anesthesia Post Evaluation    Patient: Froilan Ray    Procedure(s) Performed: Procedure(s) (LRB):  ULTRASOUND, UPPER GI TRACT, ENDOSCOPIC (N/A)    Final Anesthesia Type: general      Patient location during evaluation: GI PACU  Patient participation: Yes- Able to Participate  Level of consciousness: awake and alert  Post-procedure vital signs: reviewed and stable  Pain management: adequate  Airway patency: patent    PONV status at discharge: No PONV  Anesthetic complications: no      Cardiovascular status: stable  Respiratory status: unassisted  Hydration status: euvolemic  Follow-up not needed.              Vitals Value Taken Time   /91 09/27/24 1542   Temp 36.6 °C (97.8 °F) 09/27/24 1542   Pulse 89 09/27/24 1542   Resp 16 09/27/24 1542   SpO2 99 % 09/27/24 1542         Event Time   Out of Recovery 09/27/2024 14:57:28         Pain/Duarte Score: Pain Rating Prior to Med Admin: 7 (9/27/2024 12:50 AM)  Pain Rating Post Med Admin: 3 (9/26/2024  2:55 AM)  Duarte Score: 10 (9/27/2024  2:44 PM)

## 2024-09-27 NOTE — [employer] PATIENT INSTRUCTIONS
Discharge Summary/Instructions after an Endoscopic Procedure  Patient Name: Froilan Ray  Patient MRN: [id number]  Patient YOB: 1945  Friday, September 27, 2024  Marcio Nguyễn III, MD  RESTRICTIONS:  During your procedure today, you received medications for sedation.  These   medications may affect your judgment, balance and coordination.  Therefore,   for 24 hours, you have the following restrictions:   - DO NOT drive a car, operate machinery, make legal/financial decisions,   sign important papers or drink alcohol.    ACTIVITY:  Today: no heavy lifting, straining or running due to procedural   sedation/anesthesia.  The following day: return to full activity including work.  DIET:  Eat and drink normally unless instructed otherwise.     TREATMENT FOR COMMON SIDE EFFECTS:  - Mild abdominal pain, nausea, belching, bloating or excessive gas:  rest,   eat lightly and use a heating pad.  - Sore Throat: treat with throat lozenges and/or gargle with warm salt   water.  - Because air was used during the procedure, expelling large amounts of air   from your rectum or belching is normal.  - If a bowel prep was taken, you may not have a bowel movement for 1-3 days.    This is normal.  SYMPTOMS TO WATCH FOR AND REPORT TO YOUR PHYSICIAN:  1. Abdominal pain or bloating, other than gas cramps.  2. Chest pain.  3. Back pain.  4. Signs of infection such as: chills or fever occurring within 24 hours   after the procedure.  5. Rectal bleeding, which would show as bright red, maroon, or black stools.   (A tablespoon of blood from the rectum is not serious, especially if   hemorrhoids are present.)  6. Vomiting.  7. Weakness or dizziness.  GO DIRECTLY TO THE NEAREST EMERGENCY ROOM IF YOU HAVE ANY OF THE FOLLOWING:      Difficulty breathing              Chills and/or fever over 101 F   Persistent vomiting and/or vomiting blood   Severe abdominal pain   Severe chest pain   Black, tarry stools   Bleeding- more than  "one tablespoon   Any other symptom or condition that you feel may need urgent attention  Your doctor recommends these additional instructions:  If any biopsies were taken, your doctors clinic will contact you in 1 to 2   weeks with any results.  - Discharge patient to home.   - Patient has a contact number available for emergencies.  The signs and   symptoms of potential delayed complications were discussed with the   patient.  Return to normal activities tomorrow.  Written discharge   instructions were provided to the patient.   - Resume regular diet.   - Continue present medications.   - Discussed w/ patient; Abd pain improved. Prefers outpatient colonoscopy   for "colitis" on CT. Consider ERCP only if pain w/ LFTs elevated as biliary   dilation has been present since EUS on '20.  For questions, problems or results please call your physician - Marcio Nguyễn III, MD at Work:  [phone].  [facility], EMERGENCY ROOM PHONE NUMBER: [phone]  IF A COMPLICATION OR EMERGENCY SITUATION ARISES AND YOU ARE UNABLE TO REACH   YOUR PHYSICIAN - GO DIRECTLY TO THE EMERGENCY ROOM.  Marcio Nguyễn III, MD  9/27/2024 2:36:10 PM  This report has been verified and signed electronically.  Dear patient,  As a result of recent federal legislation (The Federal Cures Act), you may   receive lab or pathology results from your procedure in your [facility]   account before your physician is able to contact you. Your physician or   their representative will relay the results to you with their   recommendations at their soonest availability.  Thank you,  [employer]  "

## 2024-09-27 NOTE — PLAN OF CARE
Pt clear for DC from case management standpoint. Discharging to home- patient son can transport.       09/27/24 1443   Final Note   Assessment Type Final Discharge Note   Anticipated Discharge Disposition Home   Hospital Resources/Appts/Education Provided Appointments scheduled and added to AVS

## 2024-09-27 NOTE — SUBJECTIVE & OBJECTIVE
Interval History:  Patient seen and examined abdominal pain comes and goes.    Review of Systems   Constitutional:  Positive for activity change and appetite change.   Respiratory: Negative.     Cardiovascular: Negative.    Gastrointestinal:  Positive for abdominal pain, constipation and nausea. Negative for abdominal distention, blood in stool, diarrhea and vomiting.   Genitourinary: Negative.    Musculoskeletal:  Positive for arthralgias and gait problem.   Skin: Negative.    Neurological:  Positive for weakness.   Hematological:  Bruises/bleeds easily.   Psychiatric/Behavioral:  Negative for confusion.      Objective:     Vital Signs (Most Recent):  Temp: 98.2 °F (36.8 °C) (09/27/24 1434)  Pulse: 85 (09/27/24 1443)  Resp: 12 (09/27/24 1443)  BP: 129/67 (09/27/24 1443)  SpO2: 99 % (RA) (09/27/24 1443) Vital Signs (24h Range):  Temp:  [97.7 °F (36.5 °C)-98.8 °F (37.1 °C)] 98.2 °F (36.8 °C)  Pulse:  [74-98] 85  Resp:  [12-19] 12  SpO2:  [92 %-100 %] 99 %  BP: (106-168)/(56-90) 129/67     Weight: 59.8 kg (131 lb 13.4 oz)  Body mass index is 21.28 kg/m².    Intake/Output Summary (Last 24 hours) at 9/27/2024 1443  Last data filed at 9/27/2024 1326  Gross per 24 hour   Intake 220 ml   Output --   Net 220 ml         Physical Exam  Constitutional:       Comments: Lethargic but arousable    HENT:      Head: Normocephalic and atraumatic.      Mouth/Throat:      Mouth: Mucous membranes are dry.      Pharynx: Oropharynx is clear.   Cardiovascular:      Rate and Rhythm: Normal rate and regular rhythm.      Pulses: Normal pulses.   Pulmonary:      Effort: Pulmonary effort is normal. No respiratory distress.      Breath sounds: No wheezing.   Abdominal:      General: Bowel sounds are normal. There is no distension.      Palpations: Abdomen is soft.      Tenderness: There is abdominal tenderness. There is no guarding.      Comments: Bruising across upper abdominal wall    Musculoskeletal:         General: Signs of injury present.  No swelling.      Cervical back: Normal range of motion and neck supple.   Skin:     General: Skin is warm and dry.      Capillary Refill: Capillary refill takes less than 2 seconds.      Coloration: Skin is pale.   Neurological:      General: No focal deficit present.      Cranial Nerves: No cranial nerve deficit.      Motor: Weakness present.             Significant Labs: All pertinent labs within the past 24 hours have been reviewed.  Recent Lab Results         09/27/24  0250        Albumin 3.2              ALT 17       Anion Gap 7       AST 15       Baso # 0.06       Basophil % 1.0       BILIRUBIN TOTAL 0.8  Comment: For infants and newborns, interpretation of results should be based  on gestational age, weight and in agreement with clinical  observations.    Premature Infant recommended reference ranges:  Up to 24 hours.............<8.0 mg/dL  Up to 48 hours............<12.0 mg/dL  3-5 days..................<15.0 mg/dL  6-29 days.................<15.0 mg/dL         BUN 20       Calcium 8.7       Chloride 104       CO2 27       Creatinine 0.7       Differential Method Automated       eGFR >60.0       Eos # 1.2       Eos % 19.7       Glucose 101       Gran # (ANC) 3.1       Gran % 51.4       Hematocrit 29.3       Hemoglobin 9.0       Immature Grans (Abs) 0.02  Comment: Mild elevation in immature granulocytes is non specific and   can be seen in a variety of conditions including stress response,   acute inflammation, trauma and pregnancy. Correlation with other   laboratory and clinical findings is essential.         Immature Granulocytes 0.3       Lymph # 1.2       Lymph % 19.9       Magnesium  2.1       MCH 26.5       MCHC 30.7       MCV 86       Mono # 0.5       Mono % 7.7       MPV 9.2       nRBC 0       Platelet Count 451       Potassium 4.6       PROTEIN TOTAL 5.8       RBC 3.39       RDW 14.1       Sodium 138       WBC 6.08               Significant Imaging: I have reviewed all pertinent imaging  results/findings within the past 24 hours.  Imaging Results              CTA Abdomen (Final result)  Result time 09/25/24 21:24:28      Final result by Kurtis Segundo IV, MD (09/25/24 21:24:28)                   Impression:      1. Better evaluation of the colon was performed on the prior exam dated the same.  There is however still noted to be wall thickening involving the descending colon and colon at the splenic flexure in particular with significant ascites in the abdomen.  Please correlate for etiologies.  Bowel wall thickening can relate to inflammatory bowel disease, colitis, diverticulitis as well as ischemic change  2. The celiac artery, SMA, and CAITY appear predominantly patent proximal without obvious stenosis, aneurysm, or occlusion identified.  The patient has a less atherosclerotic change than would be expected for a patient of this age.  3. 4 mm pulmonary nodule new since 06/16/2022.  Follow-up in 6 months to a year for size stability would be suggested.  4. Moderate size hiatal hernia  5. Hepatic hypodensities statistically favored relate to cysts without worrisome characteristics some are too small to ideally categorize the but without worrisome characteristics the majority stable since at least 01/16/2024  6. Intrahepatic biliary prominence with evidence of cholecystectomy.  Correlation with biliary enzymes may be of use to exclude a distal obstructive process.      Electronically signed by: Kurtis Segundo MD  Date:    09/25/2024  Time:    21:24               Narrative:    EXAMINATION:  CTA ABDOMEN    CLINICAL HISTORY:  recurrent ischemic colitis;    TECHNIQUE:  CTA imaging was obtained from the level lung bases to the level of the iliac wings with utilization of 100 cc of Omni 350.    COMPARISON:  A CT from earlier in the day was available    FINDINGS:  A 4 mm pulmonary nodule is noted in the left lower lobe image 12 sequence 4.  This is favored to be new since 06/16/2022.  Continued follow-up in  6 months to 1 year for size stability would be suggested.    A stable 4 mm nodule is noted left lower lobe image 25 sequence 4.    Some band like changes are noted in the lower lobes bilaterally suggesting atelectasis or scarring.    Extensive intra-abdominal ascites is noted similar since the prior exam of today but progressed significantly since 09/08/2024.    A hiatal hernia appears to be present.  The stomach is not well distended or evaluated.  Given the lack of distension excluding wall thickening is not possible.    Spleen demonstrates heterogeneous enhancement on today's exam favored relate to contrast phase    Contrast distension of the bladder is noted.    Colonic wall thickening is still suspected involving the descending colon and transverse colon near the splenic flexure which is better displayed on the prior exam from today.    A nonobstructive calcification is noted at the lower pole the left kidney suggestive a stone of mm.    The celiac artery has its origin at table position the 114 and appears patent proximally.  This appears to feed the splenic artery alone    The hepatic artery appears to arise from the SMA.  SMA has its origin at table position 138 and appears patent proximally.  The SMA proximal branches appear patent without evidence of significant stenosis noted on today's exam.  The inferior mesenteric artery has its origin at 196 and appears patent proximally.  It cannot be followed to its pelvic anastomoses but no worrisome occlusion is identified    A proximal branching or 2 adjacent left renal arteries are noted with origin at table position 141.  The appear patent proximally.  The right renal artery has its origin table position 151 and appears patent proximally.  The aorta does not appear to demonstrate significant atherosclerotic change.  A    Calcification of the mitral valve appears to be present which could relate to prior inflammation or procedure    Hepatic hypodensities are  noted without worrisome characteristics better displayed on the prior of today given the contrast phase too small to categorize but without worrisome characteristics statistically favored relate to cysts and appearing relatively stable since 01/16/2024    Intrahepatic biliary prominence is noted prior exam dated the same that appears new since 01/16/2024.  Interval cholecystectomy has been performed.  The common appears prominent which likely relates to cholecystectomy.  Correlation with biliary enzymes is suggested    Significant levocurvature is noted to the lumbar spine.                                        CT Abdomen Pelvis With IV Contrast NO Oral Contrast (Final result)  Result time 09/25/24 05:14:31      Final result by Bill Valadez MD (09/25/24 05:14:31)                   Impression:      Circumferential wall thickening involving the left colon and rectosigmoid colon as well as a segment of the hepatic flexure of the right colon, correlation for colitis is needed.    There is mild to moderate distention along the transverse colon with moderate to prominent distention of the right colon, with stool, potentially secondary to partial obstructive change associated with the aforementioned findings of suspected colitis.    Multiple dilated small bowel loops in the right lower abdomen, these may potentially relate to the colonic distention with stool however if there is persistent or worsening symptomatology additional follow-up to exclude developing obstruction is recommended.    Mild wall and fold thickening of the stomach, correlation for gastritis is needed.    Interval cholecystectomy, biliary dilatation may relate to passive biliary dilatation of prior cholecystectomy, clinical correlation otherwise needed.    Mild fluid at the gallbladder fossa does not appear to represent a walled off fluid collection and may relate to mild free fluid of the abdomen and pelvis.    Fluid collection associated with  the postoperative left hip as discussed above.    Additional findings as above.    This report was flagged in Epic as abnormal.      Electronically signed by: Bill Valadez  Date:    09/25/2024  Time:    05:14               Narrative:    EXAMINATION:  CT ABDOMEN PELVIS WITH IV CONTRAST    CLINICAL HISTORY:  RLQ abdominal pain (Age >= 14y);    TECHNIQUE:  Low dose axial images, sagittal and coronal reformations were obtained from the lung bases to the pubic symphysis following the IV administration of 100 mL of Omnipaque 350 oral contrast was not utilized.  Single phase postcontrast CT examination of the abdomen and pelvis is submitted.    COMPARISON:  CT examination of the abdomen and pelvis September 8, 2024    FINDINGS:  Artifact is present, this diminishes the sensitivity of the examination.  The visualized lung bases demonstrate mild motion artifact and mild atelectatic change.  There is a small to moderate hiatal hernia noted.  Gastric wall and fold thickening is noted, nonspecific, seen as an interval change, correlation for gastritis is needed.    The gallbladder is surgically absent, appearing to have been resected in the interim since the prior study.  There is mild fluid seen at the gallbladder fossa however without a thick-walled configuration.  This may relate to the appearance of mild free fluid throughout the abdomen and pelvis..  There is biliary dilatation, this may relate to passive biliary dilatation of prior cholecystectomy, clinical and historical correlation is otherwise needed.    There is no evidence for peripancreatic inflammatory change and no evidence for pancreatic ductal dilatation.  There is no additional evidence for acute process of the liver, pancreas, spleen or adrenal glands, adrenal gland thickening is again noted appearing stable.    Nonobstructing nephrolithiasis of the left kidney is noted.  There is no evidence for ureteral calculus or obstructive uropathy or perinephric  inflammatory change bilaterally.  The abdominal aorta appears normal in caliber, demonstrates appropriate opacification.  The urinary bladder appears unremarkable for degree of distention.    There is appearance of circumferential wall thickening involving the hepatic flexure and descending colon and rectosigmoid colon, correlation for colitis is needed.  These segments of the colon demonstrate mild air and stool within the lumen however predominantly decompressed.  There is mild-to-moderate distention of the transverse colon with moderate to prominent distention of the right colon with stool.  There is mild circumferential thickening along the hepatic flexure, correlation for colitis is needed.    There is a structure consistent with the appendix, difficult to delineate in its entirety, primary appendiceal inflammatory process is not thought present.    There are several dilated small bowel loops in the right lower abdomen, it is possible these small bowel loops are dilated due to the distention of the right colon with stool, however if there is persistent or worsening symptomatology additional follow-up to exclude developing obstruction is recommended.  There is no evidence for free intraperitoneal air.  There is no evidence for pneumatosis or portal venous air or mesenteric venous air.    The osseous structures demonstrate chronic change, there is scoliotic curvature of the spine, postoperative change of the spine is noted.  Left hip replacement noted, there is associated artifact.  There is appearance posterior to the left hip of variable attenuation diminished attenuation measuring up to approximately 6.1 x 1.8 cm on axial image 188, this may relate to a fluid collection, this may relate to a seroma or hematoma, however does not appear acute.  Correlation for signs or symptoms of infection is otherwise needed.                                       X-Ray Chest AP Portable (Final result)  Result time 09/25/24  04:24:36      Final result by Bill Valadez MD (09/25/24 04:24:36)                   Impression:      Diminished depth of inspiration and elevation of the right hemidiaphragm, and mild atelectatic change noted, there is no additional radiographic evidence for acute intrathoracic process.      Electronically signed by: Bill Valadez  Date:    09/25/2024  Time:    04:24               Narrative:    EXAMINATION:  XR CHEST AP PORTABLE    CLINICAL HISTORY:  Chest pain, unspecified    TECHNIQUE:  Single frontal view of the chest was performed.    COMPARISON:  Chest radiograph September 8, 2024    FINDINGS:  Single portable chest view is submitted.  There is diminished depth of inspiration and elevation of the right hemidiaphragm and minimal rotation, when accounting for position and technique and depth of inspiration the appearance of the cardiomediastinal silhouette is stable.    Mild atelectatic change noted.  There is no evidence for superimposed confluent infiltrate or consolidation, significant pleural effusion or pneumothorax.    The osseous structures demonstrate chronic change.                                  MRI MRCP Abdomen WO Contrast 3D WO Independent WS XPD  Order: [id number]  Status: Final result       Visible to patient: No (inaccessible in MyChart)       Next appt: 10/01/2024 at 02:00 PM in Surgery (Cipriano Ambrosio MD)    0 Result Notes  Details    Reading Physician Reading Date Result Priority   Mekhi Kwon MD  [phone] 9/26/2024 Routine     Narrative & Impression  EXAMINATION:  MRI MRCP ABDOMEN WO CONTRAST 3D WO INDEPENDENT WS XPD     CLINICAL HISTORY:  Biliary obstruction suspected (Ped 0-18y);     TECHNIQUE:  Multiplanar imaging through the abdomen including heavily T2 weighted slab coronal imaging was performed, with maximum intensity projections reviewed in multiple planes.     FINDINGS:  Comparison to multiple prior exams.  The gallbladder is surgically absent, with the cystic duct  remnant normal in caliber.  The intrahepatic bile ducts are normal in caliber, with no filling defects, stricturing or beading.     The common hepatic duct measures 10 mm maximum diameter, with the common bile duct measuring 9 mm maximum diameter.  There is a truncated appearance of the distal common bile duct at the level of the ampulla, nonspecific.  There are no biliary ductal filling defects to suggest choledocholithiasis.     The main pancreatic duct is normal in caliber with findings of pancreas divisum, the dorsal pancreatic duct draining through accessory papilla proximal to the ampulla.     There multiple T2 hyperintense simple hepatic cysts, with the spleen having normal size and signal intensity.  The pancreas and adrenal glands are unremarkable, with circumscribed T2 hyperintense bilateral simple renal cysts.  There is a moderate sized sliding-type hiatal hernia.  There is moderate volume of T2 hyperintense ascites in the visualized upper abdomen.     Impression:     1. Dilated common hepatic and common bile ducts, with truncated appearance of the distal common bile duct at the level of the ampulla.  When correlated with recent CT, there is question of duodenal mucosal nodularity and a mucosal based duodenal mass is not excluded.  Correlation with endoscopy is recommended.  2. Negative for choledocholithiasis.  3. Pancreas divisum.  4. Additional observations as described.        Electronically signed by:Mekhi Kwon  Date:                                            09/26/2024  Time:                                           16:18

## 2024-09-27 NOTE — PROGRESS NOTES
[facility]  Progress Note    Patient Name: Froilan Ray  MRN: [id number]  Patient Class: IP- Inpatient   Admission Date: 9/25/2024  Length of Stay: 1 days  Attending Physician: Micah Cortes DO  Primary Care Provider: Alphonse Boothe III, MD        Subjective:     Principal Problem:Colitis        HPI:  ED HPI:  78-year-old woman with a history of recent cholecystectomy presents for evaluation of right upper and lower quadrant abdominal pain that has been occurring intermittently since her surgery but woke her up tonight and was very severe.  It is described as crampy.  She took Percocet at home with minimal relief.  She denies fever chills cough congestion shortness of breath.  She reports having all over chest pain earlier tonight when she was having the pain.  The chest pain has resolved.  She denies nausea vomiting.  She denies change in bladder habits.  She reports she last pooped yesterday, she reports she has not passed gas today.  Review of patient's allergies indicates:  No Known Allergies    On my evaluation, she is pretty lethargic after receiving IV keppra. She has a chronic hip wound and completed several weeks of IV antibiotics and now on doxycyline daily for indefinite amount of time per . No significant abnormalities on labs except for chronic low H/H. WBC in normal range but she does have a mild eosinophilia count (9.5%). No severe electrolyte abnormalities. She does oxycodone for chronic pain at home and has diagnosis of opioid induced constipation. CT imaging suggest possible early SBO but diffuse colitis findings throughout. General surgery consulted and recommends GI evaluation.   Continued on IV zosyn.     Overview/Hospital Course:  78-year-old woman with a history of recent cholecystectomy presents for evaluation of right upper and lower quadrant abdominal pain.  General surgery and they do not believe there is any role for surgical  intervention.  Also evaluated by Gastroenterology consideration of EGD and are patient underwent MRCP 9/26 which showed possible duodenal mass.  Patient taken for EGD and EUS 9/27 and results pending.    Interval History:  Patient seen and examined abdominal pain comes and goes.    Review of Systems   Constitutional:  Positive for activity change and appetite change.   Respiratory: Negative.     Cardiovascular: Negative.    Gastrointestinal:  Positive for abdominal pain, constipation and nausea. Negative for abdominal distention, blood in stool, diarrhea and vomiting.   Genitourinary: Negative.    Musculoskeletal:  Positive for arthralgias and gait problem.   Skin: Negative.    Neurological:  Positive for weakness.   Hematological:  Bruises/bleeds easily.   Psychiatric/Behavioral:  Negative for confusion.      Objective:     Vital Signs (Most Recent):  Temp: 98.2 °F (36.8 °C) (09/27/24 1434)  Pulse: 85 (09/27/24 1443)  Resp: 12 (09/27/24 1443)  BP: 129/67 (09/27/24 1443)  SpO2: 99 % (RA) (09/27/24 1443) Vital Signs (24h Range):  Temp:  [97.7 °F (36.5 °C)-98.8 °F (37.1 °C)] 98.2 °F (36.8 °C)  Pulse:  [74-98] 85  Resp:  [12-19] 12  SpO2:  [92 %-100 %] 99 %  BP: (106-168)/(56-90) 129/67     Weight: 59.8 kg (131 lb 13.4 oz)  Body mass index is 21.28 kg/m².    Intake/Output Summary (Last 24 hours) at 9/27/2024 1443  Last data filed at 9/27/2024 1326  Gross per 24 hour   Intake 220 ml   Output --   Net 220 ml         Physical Exam  Constitutional:       Comments: Lethargic but arousable    HENT:      Head: Normocephalic and atraumatic.      Mouth/Throat:      Mouth: Mucous membranes are dry.      Pharynx: Oropharynx is clear.   Cardiovascular:      Rate and Rhythm: Normal rate and regular rhythm.      Pulses: Normal pulses.   Pulmonary:      Effort: Pulmonary effort is normal. No respiratory distress.      Breath sounds: No wheezing.   Abdominal:      General: Bowel sounds are normal. There is no distension.       Palpations: Abdomen is soft.      Tenderness: There is abdominal tenderness. There is no guarding.      Comments: Bruising across upper abdominal wall    Musculoskeletal:         General: Signs of injury present. No swelling.      Cervical back: Normal range of motion and neck supple.   Skin:     General: Skin is warm and dry.      Capillary Refill: Capillary refill takes less than 2 seconds.      Coloration: Skin is pale.   Neurological:      General: No focal deficit present.      Cranial Nerves: No cranial nerve deficit.      Motor: Weakness present.             Significant Labs: All pertinent labs within the past 24 hours have been reviewed.  Recent Lab Results         09/27/24  0250        Albumin 3.2              ALT 17       Anion Gap 7       AST 15       Baso # 0.06       Basophil % 1.0       BILIRUBIN TOTAL 0.8  Comment: For infants and newborns, interpretation of results should be based  on gestational age, weight and in agreement with clinical  observations.    Premature Infant recommended reference ranges:  Up to 24 hours.............<8.0 mg/dL  Up to 48 hours............<12.0 mg/dL  3-5 days..................<15.0 mg/dL  6-29 days.................<15.0 mg/dL         BUN 20       Calcium 8.7       Chloride 104       CO2 27       Creatinine 0.7       Differential Method Automated       eGFR >60.0       Eos # 1.2       Eos % 19.7       Glucose 101       Gran # (ANC) 3.1       Gran % 51.4       Hematocrit 29.3       Hemoglobin 9.0       Immature Grans (Abs) 0.02  Comment: Mild elevation in immature granulocytes is non specific and   can be seen in a variety of conditions including stress response,   acute inflammation, trauma and pregnancy. Correlation with other   laboratory and clinical findings is essential.         Immature Granulocytes 0.3       Lymph # 1.2       Lymph % 19.9       Magnesium  2.1       MCH 26.5       MCHC 30.7       MCV 86       Mono # 0.5       Mono % 7.7       MPV 9.2        nRBC 0       Platelet Count 451       Potassium 4.6       PROTEIN TOTAL 5.8       RBC 3.39       RDW 14.1       Sodium 138       WBC 6.08               Significant Imaging: I have reviewed all pertinent imaging results/findings within the past 24 hours.  Imaging Results              CTA Abdomen (Final result)  Result time 09/25/24 21:24:28      Final result by Kurtis Segundo IV, MD (09/25/24 21:24:28)                   Impression:      1. Better evaluation of the colon was performed on the prior exam dated the same.  There is however still noted to be wall thickening involving the descending colon and colon at the splenic flexure in particular with significant ascites in the abdomen.  Please correlate for etiologies.  Bowel wall thickening can relate to inflammatory bowel disease, colitis, diverticulitis as well as ischemic change  2. The celiac artery, SMA, and CAITY appear predominantly patent proximal without obvious stenosis, aneurysm, or occlusion identified.  The patient has a less atherosclerotic change than would be expected for a patient of this age.  3. 4 mm pulmonary nodule new since 06/16/2022.  Follow-up in 6 months to a year for size stability would be suggested.  4. Moderate size hiatal hernia  5. Hepatic hypodensities statistically favored relate to cysts without worrisome characteristics some are too small to ideally categorize the but without worrisome characteristics the majority stable since at least 01/16/2024  6. Intrahepatic biliary prominence with evidence of cholecystectomy.  Correlation with biliary enzymes may be of use to exclude a distal obstructive process.      Electronically signed by: Kurtis Segundo MD  Date:    09/25/2024  Time:    21:24               Narrative:    EXAMINATION:  CTA ABDOMEN    CLINICAL HISTORY:  recurrent ischemic colitis;    TECHNIQUE:  CTA imaging was obtained from the level lung bases to the level of the iliac wings with utilization of 100 cc of Omni  350.    COMPARISON:  A CT from earlier in the day was available    FINDINGS:  A 4 mm pulmonary nodule is noted in the left lower lobe image 12 sequence 4.  This is favored to be new since 06/16/2022.  Continued follow-up in 6 months to 1 year for size stability would be suggested.    A stable 4 mm nodule is noted left lower lobe image 25 sequence 4.    Some band like changes are noted in the lower lobes bilaterally suggesting atelectasis or scarring.    Extensive intra-abdominal ascites is noted similar since the prior exam of today but progressed significantly since 09/08/2024.    A hiatal hernia appears to be present.  The stomach is not well distended or evaluated.  Given the lack of distension excluding wall thickening is not possible.    Spleen demonstrates heterogeneous enhancement on today's exam favored relate to contrast phase    Contrast distension of the bladder is noted.    Colonic wall thickening is still suspected involving the descending colon and transverse colon near the splenic flexure which is better displayed on the prior exam from today.    A nonobstructive calcification is noted at the lower pole the left kidney suggestive a stone of mm.    The celiac artery has its origin at table position the 114 and appears patent proximally.  This appears to feed the splenic artery alone    The hepatic artery appears to arise from the SMA.  SMA has its origin at table position 138 and appears patent proximally.  The SMA proximal branches appear patent without evidence of significant stenosis noted on today's exam.  The inferior mesenteric artery has its origin at 196 and appears patent proximally.  It cannot be followed to its pelvic anastomoses but no worrisome occlusion is identified    A proximal branching or 2 adjacent left renal arteries are noted with origin at table position 141.  The appear patent proximally.  The right renal artery has its origin table position 151 and appears patent proximally.   The aorta does not appear to demonstrate significant atherosclerotic change.  A    Calcification of the mitral valve appears to be present which could relate to prior inflammation or procedure    Hepatic hypodensities are noted without worrisome characteristics better displayed on the prior of today given the contrast phase too small to categorize but without worrisome characteristics statistically favored relate to cysts and appearing relatively stable since 01/16/2024    Intrahepatic biliary prominence is noted prior exam dated the same that appears new since 01/16/2024.  Interval cholecystectomy has been performed.  The common appears prominent which likely relates to cholecystectomy.  Correlation with biliary enzymes is suggested    Significant levocurvature is noted to the lumbar spine.                                        CT Abdomen Pelvis With IV Contrast NO Oral Contrast (Final result)  Result time 09/25/24 05:14:31      Final result by Bill Valadez MD (09/25/24 05:14:31)                   Impression:      Circumferential wall thickening involving the left colon and rectosigmoid colon as well as a segment of the hepatic flexure of the right colon, correlation for colitis is needed.    There is mild to moderate distention along the transverse colon with moderate to prominent distention of the right colon, with stool, potentially secondary to partial obstructive change associated with the aforementioned findings of suspected colitis.    Multiple dilated small bowel loops in the right lower abdomen, these may potentially relate to the colonic distention with stool however if there is persistent or worsening symptomatology additional follow-up to exclude developing obstruction is recommended.    Mild wall and fold thickening of the stomach, correlation for gastritis is needed.    Interval cholecystectomy, biliary dilatation may relate to passive biliary dilatation of prior cholecystectomy, clinical  correlation otherwise needed.    Mild fluid at the gallbladder fossa does not appear to represent a walled off fluid collection and may relate to mild free fluid of the abdomen and pelvis.    Fluid collection associated with the postoperative left hip as discussed above.    Additional findings as above.    This report was flagged in Epic as abnormal.      Electronically signed by: iBll Valadez  Date:    09/25/2024  Time:    05:14               Narrative:    EXAMINATION:  CT ABDOMEN PELVIS WITH IV CONTRAST    CLINICAL HISTORY:  RLQ abdominal pain (Age >= 14y);    TECHNIQUE:  Low dose axial images, sagittal and coronal reformations were obtained from the lung bases to the pubic symphysis following the IV administration of 100 mL of Omnipaque 350 oral contrast was not utilized.  Single phase postcontrast CT examination of the abdomen and pelvis is submitted.    COMPARISON:  CT examination of the abdomen and pelvis September 8, 2024    FINDINGS:  Artifact is present, this diminishes the sensitivity of the examination.  The visualized lung bases demonstrate mild motion artifact and mild atelectatic change.  There is a small to moderate hiatal hernia noted.  Gastric wall and fold thickening is noted, nonspecific, seen as an interval change, correlation for gastritis is needed.    The gallbladder is surgically absent, appearing to have been resected in the interim since the prior study.  There is mild fluid seen at the gallbladder fossa however without a thick-walled configuration.  This may relate to the appearance of mild free fluid throughout the abdomen and pelvis..  There is biliary dilatation, this may relate to passive biliary dilatation of prior cholecystectomy, clinical and historical correlation is otherwise needed.    There is no evidence for peripancreatic inflammatory change and no evidence for pancreatic ductal dilatation.  There is no additional evidence for acute process of the liver, pancreas, spleen or  adrenal glands, adrenal gland thickening is again noted appearing stable.    Nonobstructing nephrolithiasis of the left kidney is noted.  There is no evidence for ureteral calculus or obstructive uropathy or perinephric inflammatory change bilaterally.  The abdominal aorta appears normal in caliber, demonstrates appropriate opacification.  The urinary bladder appears unremarkable for degree of distention.    There is appearance of circumferential wall thickening involving the hepatic flexure and descending colon and rectosigmoid colon, correlation for colitis is needed.  These segments of the colon demonstrate mild air and stool within the lumen however predominantly decompressed.  There is mild-to-moderate distention of the transverse colon with moderate to prominent distention of the right colon with stool.  There is mild circumferential thickening along the hepatic flexure, correlation for colitis is needed.    There is a structure consistent with the appendix, difficult to delineate in its entirety, primary appendiceal inflammatory process is not thought present.    There are several dilated small bowel loops in the right lower abdomen, it is possible these small bowel loops are dilated due to the distention of the right colon with stool, however if there is persistent or worsening symptomatology additional follow-up to exclude developing obstruction is recommended.  There is no evidence for free intraperitoneal air.  There is no evidence for pneumatosis or portal venous air or mesenteric venous air.    The osseous structures demonstrate chronic change, there is scoliotic curvature of the spine, postoperative change of the spine is noted.  Left hip replacement noted, there is associated artifact.  There is appearance posterior to the left hip of variable attenuation diminished attenuation measuring up to approximately 6.1 x 1.8 cm on axial image 188, this may relate to a fluid collection, this may relate to a  seroma or hematoma, however does not appear acute.  Correlation for signs or symptoms of infection is otherwise needed.                                       X-Ray Chest AP Portable (Final result)  Result time 09/25/24 04:24:36      Final result by Bill Valadez MD (09/25/24 04:24:36)                   Impression:      Diminished depth of inspiration and elevation of the right hemidiaphragm, and mild atelectatic change noted, there is no additional radiographic evidence for acute intrathoracic process.      Electronically signed by: Bill Valadez  Date:    09/25/2024  Time:    04:24               Narrative:    EXAMINATION:  XR CHEST AP PORTABLE    CLINICAL HISTORY:  Chest pain, unspecified    TECHNIQUE:  Single frontal view of the chest was performed.    COMPARISON:  Chest radiograph September 8, 2024    FINDINGS:  Single portable chest view is submitted.  There is diminished depth of inspiration and elevation of the right hemidiaphragm and minimal rotation, when accounting for position and technique and depth of inspiration the appearance of the cardiomediastinal silhouette is stable.    Mild atelectatic change noted.  There is no evidence for superimposed confluent infiltrate or consolidation, significant pleural effusion or pneumothorax.    The osseous structures demonstrate chronic change.                                  MRI MRCP Abdomen WO Contrast 3D WO Independent WS XPD  Order: [id number]  Status: Final result       Visible to patient: No (inaccessible in MyChart)       Next appt: 10/01/2024 at 02:00 PM in Surgery (Cipriano Ambrosio MD)    0 Result Notes  Details    Reading Physician Reading Date Result Priority   Mekhi Kwon MD  [phone] 9/26/2024 Routine     Narrative & Impression  EXAMINATION:  MRI MRCP ABDOMEN WO CONTRAST 3D WO INDEPENDENT WS XPD     CLINICAL HISTORY:  Biliary obstruction suspected (Ped 0-18y);     TECHNIQUE:  Multiplanar imaging through the abdomen including heavily T2  weighted slab coronal imaging was performed, with maximum intensity projections reviewed in multiple planes.     FINDINGS:  Comparison to multiple prior exams.  The gallbladder is surgically absent, with the cystic duct remnant normal in caliber.  The intrahepatic bile ducts are normal in caliber, with no filling defects, stricturing or beading.     The common hepatic duct measures 10 mm maximum diameter, with the common bile duct measuring 9 mm maximum diameter.  There is a truncated appearance of the distal common bile duct at the level of the ampulla, nonspecific.  There are no biliary ductal filling defects to suggest choledocholithiasis.     The main pancreatic duct is normal in caliber with findings of pancreas divisum, the dorsal pancreatic duct draining through accessory papilla proximal to the ampulla.     There multiple T2 hyperintense simple hepatic cysts, with the spleen having normal size and signal intensity.  The pancreas and adrenal glands are unremarkable, with circumscribed T2 hyperintense bilateral simple renal cysts.  There is a moderate sized sliding-type hiatal hernia.  There is moderate volume of T2 hyperintense ascites in the visualized upper abdomen.     Impression:     1. Dilated common hepatic and common bile ducts, with truncated appearance of the distal common bile duct at the level of the ampulla.  When correlated with recent CT, there is question of duodenal mucosal nodularity and a mucosal based duodenal mass is not excluded.  Correlation with endoscopy is recommended.  2. Negative for choledocholithiasis.  3. Pancreas divisum.  4. Additional observations as described.        Electronically signed by:Mekhi Kwon  Date:                                            09/26/2024  Time:                                           16:18         Assessment/Plan:      * Colitis  Appears like diffuse colitis throughout - opioid -induced constipation and chronic antibiotics maybe  contributing  IVF  Zosyn  No stool to culture thus far  General surgery consulted for suspected SBO - no surgical intervention at this time  Gi consulted - appears colitis is chronic   NPO   CRP and ESr pending   Trying to avoid heavy opioids but unlikely as she is dependent on them at this point  Gastroenterology also consulted obtain MRCP 9/26 which showed possible duodenal mass, patient going for EGD with EUS 9/27.      Seizure  Resume home oral keppra  Seizure precautions       Paroxysmal atrial fibrillation  Patient with Paroxysmal (<7 days) atrial fibrillation which is controlled currently with  no home meds . Patient is currently in sinus rhythm.[id number] Score: 3. HASBLED Score: . Anticoagulation not indicated due to sinus rhythm and h/o chronic gastric ulcer with bleeds .    Drug-induced constipation  Chronic opioids         Peripheral neuropathy  Resume home meds      Essential hypertension  Chronic, controlled. Latest blood pressure and vitals reviewed-     Temp:  [98.5 °F (36.9 °C)-98.8 °F (37.1 °C)]   Pulse:  [77-92]   Resp:  [10-27]   BP: (104-182)/(55-84)   SpO2:  [93 %-99 %] .   Home meds for hypertension were reviewed and noted below.   Hypertension Medications               amlodipine-benazepril 5-20 mg (LOTREL) 5-20 mg per capsule Take 1 capsule by mouth once daily.            While in the hospital, will manage blood pressure as follows; Continue home antihypertensive regimen    Will utilize p.r.n. blood pressure medication only if patient's blood pressure greater than 160/100 and she develops symptoms such as worsening chest pain or shortness of breath.      VTE Risk Mitigation (From admission, onward)           Ordered     Reason for No Pharmacological VTE Prophylaxis  Once        Question:  Reasons:  Answer:  Already adequately anticoagulated on oral Anticoagulants    09/25/24 0823     IP VTE HIGH RISK PATIENT  Once         09/25/24 0823     Place sequential compression device  Until  discontinued         09/25/24 0823                    Discharge Planning   BAILEY: 9/28/2024     Code Status: Full Code   Is the patient medically ready for discharge?:     Reason for patient still in hospital (select all that apply): Laboratory test, Treatment, Consult recommendations, and Pending disposition  Discharge Plan A: Home with family                  Micah Cortes DO  Department of Hospital Medicine   [facility]

## 2024-09-28 VITALS
BODY MASS INDEX: 21.18 KG/M2 | OXYGEN SATURATION: 96 % | WEIGHT: 131.81 LBS | HEART RATE: 81 BPM | SYSTOLIC BLOOD PRESSURE: 134 MMHG | DIASTOLIC BLOOD PRESSURE: 75 MMHG | HEIGHT: 66 IN | RESPIRATION RATE: 18 BRPM | TEMPERATURE: 98 F

## 2024-09-28 LAB
ALBUMIN SERPL BCP-MCNC: 3 G/DL (ref 3.5–5.2)
ALP SERPL-CCNC: 423 U/L (ref 55–135)
ALT SERPL W/O P-5'-P-CCNC: 12 U/L (ref 10–44)
ANION GAP SERPL CALC-SCNC: 7 MMOL/L (ref 8–16)
AST SERPL-CCNC: 11 U/L (ref 10–40)
BASOPHILS # BLD AUTO: 0.05 K/UL (ref 0–0.2)
BASOPHILS NFR BLD: 0.8 % (ref 0–1.9)
BILIRUB SERPL-MCNC: 0.6 MG/DL (ref 0.1–1)
BUN SERPL-MCNC: 16 MG/DL (ref 8–23)
CALCIUM SERPL-MCNC: 8.6 MG/DL (ref 8.7–10.5)
CHLORIDE SERPL-SCNC: 106 MMOL/L (ref 95–110)
CO2 SERPL-SCNC: 26 MMOL/L (ref 23–29)
CREAT SERPL-MCNC: 0.6 MG/DL (ref 0.5–1.4)
DIFFERENTIAL METHOD BLD: ABNORMAL
EOSINOPHIL # BLD AUTO: 0.8 K/UL (ref 0–0.5)
EOSINOPHIL NFR BLD: 12.7 % (ref 0–8)
ERYTHROCYTE [DISTWIDTH] IN BLOOD BY AUTOMATED COUNT: 14.1 % (ref 11.5–14.5)
EST. GFR  (NO RACE VARIABLE): >60 ML/MIN/1.73 M^2
GLUCOSE SERPL-MCNC: 98 MG/DL (ref 70–110)
HCT VFR BLD AUTO: 26.6 % (ref 37–48.5)
HGB BLD-MCNC: 8.2 G/DL (ref 12–16)
IMM GRANULOCYTES # BLD AUTO: 0.02 K/UL (ref 0–0.04)
IMM GRANULOCYTES NFR BLD AUTO: 0.3 % (ref 0–0.5)
LYMPHOCYTES # BLD AUTO: 1.3 K/UL (ref 1–4.8)
LYMPHOCYTES NFR BLD: 20.1 % (ref 18–48)
MAGNESIUM SERPL-MCNC: 2 MG/DL (ref 1.6–2.6)
MCH RBC QN AUTO: 26.5 PG (ref 27–31)
MCHC RBC AUTO-ENTMCNC: 30.8 G/DL (ref 32–36)
MCV RBC AUTO: 86 FL (ref 82–98)
MONOCYTES # BLD AUTO: 0.5 K/UL (ref 0.3–1)
MONOCYTES NFR BLD: 7.4 % (ref 4–15)
NEUTROPHILS # BLD AUTO: 3.9 K/UL (ref 1.8–7.7)
NEUTROPHILS NFR BLD: 58.7 % (ref 38–73)
NRBC BLD-RTO: 0 /100 WBC
PLATELET # BLD AUTO: 466 K/UL (ref 150–450)
PMV BLD AUTO: 9 FL (ref 9.2–12.9)
POTASSIUM SERPL-SCNC: 3.9 MMOL/L (ref 3.5–5.1)
PROT SERPL-MCNC: 5.3 G/DL (ref 6–8.4)
RBC # BLD AUTO: 3.09 M/UL (ref 4–5.4)
SODIUM SERPL-SCNC: 139 MMOL/L (ref 136–145)
WBC # BLD AUTO: 6.61 K/UL (ref 3.9–12.7)

## 2024-09-28 PROCEDURE — 36415 COLL VENOUS BLD VENIPUNCTURE: CPT | Performed by: HOSPITALIST

## 2024-09-28 PROCEDURE — 80053 COMPREHEN METABOLIC PANEL: CPT | Performed by: HOSPITALIST

## 2024-09-28 PROCEDURE — 83735 ASSAY OF MAGNESIUM: CPT | Performed by: HOSPITALIST

## 2024-09-28 PROCEDURE — 25000003 PHARM REV CODE 250: Performed by: STUDENT IN AN ORGANIZED HEALTH CARE EDUCATION/TRAINING PROGRAM

## 2024-09-28 PROCEDURE — 85025 COMPLETE CBC W/AUTO DIFF WBC: CPT | Performed by: HOSPITALIST

## 2024-09-28 PROCEDURE — 25000003 PHARM REV CODE 250: Performed by: FAMILY MEDICINE

## 2024-09-28 PROCEDURE — 25000003 PHARM REV CODE 250: Performed by: HOSPITALIST

## 2024-09-28 PROCEDURE — 25000003 PHARM REV CODE 250: Performed by: INTERNAL MEDICINE

## 2024-09-28 RX ORDER — AMLODIPINE AND BENAZEPRIL HYDROCHLORIDE 5; 20 MG/1; MG/1
1 CAPSULE ORAL DAILY
Qty: 90 CAPSULE | Refills: 3 | Status: SHIPPED | OUTPATIENT
Start: 2024-09-28

## 2024-09-28 RX ORDER — DICYCLOMINE HYDROCHLORIDE 10 MG/1
10 CAPSULE ORAL 3 TIMES DAILY PRN
Qty: 30 CAPSULE | Refills: 0 | Status: SHIPPED | OUTPATIENT
Start: 2024-09-28 | End: 2024-10-28

## 2024-09-28 RX ADMIN — PREGABALIN 200 MG: 75 CAPSULE ORAL at 08:09

## 2024-09-28 RX ADMIN — BENAZEPRIL HYDROCHLORIDE 20 MG: 20 TABLET ORAL at 08:09

## 2024-09-28 RX ADMIN — AMLODIPINE BESYLATE 5 MG: 5 TABLET ORAL at 08:09

## 2024-09-28 RX ADMIN — DOCUSATE SODIUM 100 MG: 100 CAPSULE, LIQUID FILLED ORAL at 08:09

## 2024-09-28 RX ADMIN — OXYCODONE HYDROCHLORIDE AND ACETAMINOPHEN 1 TABLET: 10; 325 TABLET ORAL at 03:09

## 2024-09-28 RX ADMIN — PANTOPRAZOLE SODIUM 40 MG: 40 TABLET, DELAYED RELEASE ORAL at 05:09

## 2024-09-28 RX ADMIN — DICYCLOMINE HYDROCHLORIDE 10 MG: 10 CAPSULE ORAL at 08:09

## 2024-09-28 RX ADMIN — LEVETIRACETAM 500 MG: 500 TABLET, FILM COATED ORAL at 08:09

## 2024-09-28 RX ADMIN — MUPIROCIN 1 G: 20 OINTMENT TOPICAL at 08:09

## 2024-09-28 NOTE — PLAN OF CARE
Problem: Adult Inpatient Plan of Care  Goal: Plan of Care Review  9/28/2024 0955 by Mike Edmondson LPN  Outcome: Met  9/28/2024 0906 by Mike Edmondson LPN  Outcome: Progressing  Goal: Patient-Specific Goal (Individualized)  9/28/2024 0955 by Mike Edmondson LPN  Outcome: Met  9/28/2024 0906 by Mike Edmondson LPN  Outcome: Progressing  Goal: Absence of Hospital-Acquired Illness or Injury  9/28/2024 0955 by Mike Edmondson LPN  Outcome: Met  9/28/2024 0906 by Mike Edmondson LPN  Outcome: Progressing  Goal: Optimal Comfort and Wellbeing  9/28/2024 0955 by Mike Edmondson LPN  Outcome: Met  9/28/2024 0906 by Mike Edmondson LPN  Outcome: Progressing  Goal: Readiness for Transition of Care  9/28/2024 0955 by Mike Edmondson LPN  Outcome: Met  9/28/2024 0906 by Mike Edmondson LPN  Outcome: Progressing     Problem: Oral Intake Inadequate  Goal: Improved Oral Intake  9/28/2024 0955 by Mike Edmondson LPN  Outcome: Met  9/28/2024 0906 by Mike Edmondson LPN  Outcome: Progressing     Problem: Fall Injury Risk  Goal: Absence of Fall and Fall-Related Injury  9/28/2024 0955 by Mike Edmondson LPN  Outcome: Met  9/28/2024 0906 by Mike Edmondson LPN  Outcome: Progressing

## 2024-09-28 NOTE — PLAN OF CARE
Pt. A&O x 4 resp. Even and unlabored no S/S of acute distress call light within reach  Problem: Adult Inpatient Plan of Care  Goal: Plan of Care Review  Outcome: Progressing  Goal: Patient-Specific Goal (Individualized)  Outcome: Progressing  Goal: Absence of Hospital-Acquired Illness or Injury  Outcome: Progressing  Goal: Optimal Comfort and Wellbeing  Outcome: Progressing  Goal: Readiness for Transition of Care  Outcome: Progressing     Problem: Oral Intake Inadequate  Goal: Improved Oral Intake  Outcome: Progressing     Problem: Fall Injury Risk  Goal: Absence of Fall and Fall-Related Injury  Outcome: Progressing

## 2024-09-28 NOTE — PLAN OF CARE
Discharge orders and chart reviewed with no further post-acute discharge needs identified at this time.     Pt is discharging home with family transport. Follow up instructions added to AVS.    At this time, patient is cleared for discharge from Case Management standpoint.         09/28/24 0977   Final Note   Assessment Type Final Discharge Note   Anticipated Discharge Disposition Home   What phone number can be called within the next 1-3 days to see how you are doing after discharge? [phone]   Post-Acute Status   Coverage [employer] MANAGED MEDICARE - [employer] PPO   Discharge Delays None known at this time

## 2024-09-28 NOTE — DISCHARGE SUMMARY
[facility] Medicine  Discharge Summary      Patient Name: Froilan Ray  MRN: [id number]  IVAN: [id number]  Patient Class: IP- Inpatient  Admission Date: 9/25/2024  Hospital Length of Stay: 2 days  Discharge Date and Time:  09/28/2024 9:11 AM  Attending Physician: Maksim Blum MD   Discharging Provider: Maksim Blum MD  Primary Care Provider: Alphonse Boothe III, MD    Primary Care Team: Networked reference to record PCT     HPI:   ED HPI:  78-year-old woman with a history of recent cholecystectomy presents for evaluation of right upper and lower quadrant abdominal pain that has been occurring intermittently since her surgery but woke her up tonight and was very severe.  It is described as crampy.  She took Percocet at home with minimal relief.  She denies fever chills cough congestion shortness of breath.  She reports having all over chest pain earlier tonight when she was having the pain.  The chest pain has resolved.  She denies nausea vomiting.  She denies change in bladder habits.  She reports she last pooped yesterday, she reports she has not passed gas today.  Review of patient's allergies indicates:  No Known Allergies    On my evaluation, she is pretty lethargic after receiving IV keppra. She has a chronic hip wound and completed several weeks of IV antibiotics and now on doxycyline daily for indefinite amount of time per . No significant abnormalities on labs except for chronic low H/H. WBC in normal range but she does have a mild eosinophilia count (9.5%). No severe electrolyte abnormalities. She does oxycodone for chronic pain at home and has diagnosis of opioid induced constipation. CT imaging suggest possible early SBO but diffuse colitis findings throughout. General surgery consulted and recommends GI evaluation.   Continued on IV zosyn.     Procedure(s) (LRB):  ULTRASOUND, UPPER GI TRACT, ENDOSCOPIC (N/A)      Hospital Course:   78-year-old woman with a  history of recent cholecystectomy presents for evaluation of right upper and lower quadrant abdominal pain.  CT abd/pelv and CTA abd as below. Only briefly given abx. General surgery consulted and they do not believe there is any role for surgical intervention.  Also evaluated by Gastroenterology. Underwent MRCP 9/26 which showed dilated common hepatic and common bile ducts, with truncated appearance of the distal common bile duct at the level of the ampulla and a possible duodenal mass.  Patient taken for EGD and EUS 9/27 showed erosive gastritis and multiple shallow ulcerations in duodenal bulb without any mass seen. GI recommended continue same meds, follow up outpatient for colonoscopy, and consider ERCP only if pain w/ LFTs elevated as biliary dilation has been present since EUS on '20. Given bentyl and laxatives. She had bowel movements here. To f/u outpatient PCP and GI. By time of discharge the patient was tolerating a regular diet with resolving admission symptoms. Patient seen and examined on day of discharge.    Physical exam on day of discharge:  General - Patient alert and oriented x3 in NAD  CV - Regular rate and rhythm   Resp -  No increased WOB  GI -  soft, non-tender/non-distended    Skin -  No masses, rashes or lesions noted on cursory skin exam.       Goals of Care Treatment Preferences:  Code Status: Full Code          What is most important right now is to focus on symptom/pain control.  Accordingly, we have decided that the best plan to meet the patient's goals includes continuing with treatment.      SDOH Screening:  The patient was screened for utility difficulties, food insecurity, transport difficulties, housing insecurity, and interpersonal safety and there were no concerns identified this admission.     Consults:   Consults (From admission, onward)          Status Ordering Provider     Inpatient consult to Gastroenterology  Once        Provider:  Saadia Chance MD    Completed  ISADORA MCKEON     Inpatient consult to General surgery  Once        Provider:  Cipriano Ambrosio MD    Completed ISADORA MCKEON            No new Assessment & Plan notes have been filed under this hospital service since the last note was generated.  Service: Hospital Medicine    Final Active Diagnoses:    Diagnosis Date Noted POA    PRINCIPAL PROBLEM:  Colitis [K52.9] 01/08/2020 Yes    Seizure [R56.9] 08/06/2024 Yes    Paroxysmal atrial fibrillation [I48.0] 06/16/2022 Yes    Drug-induced constipation [K59.03] 01/10/2020 Yes    Peripheral neuropathy [G62.9] 12/11/2018 Yes     Chronic    Essential hypertension [I10] 12/11/2018 Yes     Chronic      Problems Resolved During this Admission:       Discharged Condition: good    Disposition: Home or Self Care    Follow Up:   Follow-up Information       Alphonse Boothe III, MD Follow up on 10/3/2024.    Specialty: Family Medicine  Why: @2pm with Karina Sommer NP  Contact information:  [address]  [address]  [place] [address]  [phone]               Saadia Chance MD. Call in 2 week(s).    Specialty: Gastroenterology  Why: to schedule outpatient colonoscopy  Contact information:  [address]   [place] [address]  [phone]                           Patient Instructions:      Diet Cardiac     Notify your health care provider if you experience any of the following:  temperature >100.4     Notify your health care provider if you experience any of the following:  persistent nausea and vomiting or diarrhea     Notify your health care provider if you experience any of the following:  severe uncontrolled pain     Notify your health care provider if you experience any of the following:  redness, tenderness, or signs of infection (pain, swelling, redness, odor or green/yellow discharge around incision site)     Notify your health care provider if you experience any of the following:  difficulty breathing or increased cough     Notify your health care  provider if you experience any of the following:  severe persistent headache     Notify your health care provider if you experience any of the following:  worsening rash     Notify your health care provider if you experience any of the following:  persistent dizziness, light-headedness, or visual disturbances     Notify your health care provider if you experience any of the following:  increased confusion or weakness     Activity as tolerated       Significant Diagnostic Studies:     Lab Results   Component Value Date    WBC 6.61 09/28/2024    HGB 8.2 (L) 09/28/2024    HCT 26.6 (L) 09/28/2024    MCV 86 09/28/2024     (H) 09/28/2024       BMP  Lab Results   Component Value Date     09/28/2024    K 3.9 09/28/2024     09/28/2024    CO2 26 09/28/2024    BUN 16 09/28/2024    CREATININE 0.6 09/28/2024    CALCIUM 8.6 (L) 09/28/2024    ANIONGAP 7 (L) 09/28/2024    EGFRNORACEVR >60.0 09/28/2024         MRI MRCP Abdomen WO Contrast 3D WO Independent WS XPD    Result Date: 9/26/2024  EXAMINATION: MRI MRCP ABDOMEN WO CONTRAST 3D WO INDEPENDENT WS XPD CLINICAL HISTORY: Biliary obstruction suspected (Ped 0-18y); TECHNIQUE: Multiplanar imaging through the abdomen including heavily T2 weighted slab coronal imaging was performed, with maximum intensity projections reviewed in multiple planes. FINDINGS: Comparison to multiple prior exams.  The gallbladder is surgically absent, with the cystic duct remnant normal in caliber.  The intrahepatic bile ducts are normal in caliber, with no filling defects, stricturing or beading. The common hepatic duct measures 10 mm maximum diameter, with the common bile duct measuring 9 mm maximum diameter.  There is a truncated appearance of the distal common bile duct at the level of the ampulla, nonspecific.  There are no biliary ductal filling defects to suggest choledocholithiasis. The main pancreatic duct is normal in caliber with findings of pancreas divisum, the dorsal  pancreatic duct draining through accessory papilla proximal to the ampulla. There multiple T2 hyperintense simple hepatic cysts, with the spleen having normal size and signal intensity.  The pancreas and adrenal glands are unremarkable, with circumscribed T2 hyperintense bilateral simple renal cysts.  There is a moderate sized sliding-type hiatal hernia.  There is moderate volume of T2 hyperintense ascites in the visualized upper abdomen.     1. Dilated common hepatic and common bile ducts, with truncated appearance of the distal common bile duct at the level of the ampulla.  When correlated with recent CT, there is question of duodenal mucosal nodularity and a mucosal based duodenal mass is not excluded.  Correlation with endoscopy is recommended. 2. Negative for choledocholithiasis. 3. Pancreas divisum. 4. Additional observations as described. Electronically signed by: Mekhi Kwon Date:    09/26/2024 Time:    16:18    X-Ray Abdomen AP 1 View    Result Date: 9/26/2024  EXAMINATION: XR ABDOMEN AP 1 VIEW CLINICAL HISTORY: Ileus; TECHNIQUE: AP View(s) of the abdomen was performed. COMPARISON: 01/22/2024 FINDINGS: There is contrast material within the bladder which is distended from recent CT. There is a moderate amount of fecal material in the right colon.  There are no dilated bowel loops.  No organomegaly or abnormal soft tissue mass.  There are surgical clips in the right upper quadrant. There is leftward curvature of the lumbar spine.  There are degenerative changes.  There is a left hip arthroplasty. The lung bases are clear.     Moderate amount of fecal material in the right colon without obstruction Levoscoliosis with degenerative changes of the lumbar spine Distended bladder Electronically signed by: Kristy Angel Date:    09/26/2024 Time:    08:15    CTA Abdomen    Result Date: 9/25/2024  EXAMINATION: CTA ABDOMEN CLINICAL HISTORY: recurrent ischemic colitis; TECHNIQUE: CTA imaging was obtained from the  level lung bases to the level of the iliac wings with utilization of 100 cc of Omni 350. COMPARISON: A CT from earlier in the day was available FINDINGS: A 4 mm pulmonary nodule is noted in the left lower lobe image 12 sequence 4.  This is favored to be new since 06/16/2022.  Continued follow-up in 6 months to 1 year for size stability would be suggested. A stable 4 mm nodule is noted left lower lobe image 25 sequence 4. Some band like changes are noted in the lower lobes bilaterally suggesting atelectasis or scarring. Extensive intra-abdominal ascites is noted similar since the prior exam of today but progressed significantly since 09/08/2024. A hiatal hernia appears to be present.  The stomach is not well distended or evaluated.  Given the lack of distension excluding wall thickening is not possible. Spleen demonstrates heterogeneous enhancement on today's exam favored relate to contrast phase Contrast distension of the bladder is noted. Colonic wall thickening is still suspected involving the descending colon and transverse colon near the splenic flexure which is better displayed on the prior exam from today. A nonobstructive calcification is noted at the lower pole the left kidney suggestive a stone of mm. The celiac artery has its origin at table position the 114 and appears patent proximally.  This appears to feed the splenic artery alone The hepatic artery appears to arise from the SMA.  SMA has its origin at table position 138 and appears patent proximally.  The SMA proximal branches appear patent without evidence of significant stenosis noted on today's exam.  The inferior mesenteric artery has its origin at 196 and appears patent proximally.  It cannot be followed to its pelvic anastomoses but no worrisome occlusion is identified A proximal branching or 2 adjacent left renal arteries are noted with origin at table position 141.  The appear patent proximally.  The right renal artery has its origin table  position 151 and appears patent proximally.  The aorta does not appear to demonstrate significant atherosclerotic change.  A Calcification of the mitral valve appears to be present which could relate to prior inflammation or procedure Hepatic hypodensities are noted without worrisome characteristics better displayed on the prior of today given the contrast phase too small to categorize but without worrisome characteristics statistically favored relate to cysts and appearing relatively stable since 01/16/2024 Intrahepatic biliary prominence is noted prior exam dated the same that appears new since 01/16/2024.  Interval cholecystectomy has been performed.  The common appears prominent which likely relates to cholecystectomy.  Correlation with biliary enzymes is suggested Significant levocurvature is noted to the lumbar spine.     1. Better evaluation of the colon was performed on the prior exam dated the same.  There is however still noted to be wall thickening involving the descending colon and colon at the splenic flexure in particular with significant ascites in the abdomen.  Please correlate for etiologies.  Bowel wall thickening can relate to inflammatory bowel disease, colitis, diverticulitis as well as ischemic change 2. The celiac artery, SMA, and CAITY appear predominantly patent proximal without obvious stenosis, aneurysm, or occlusion identified.  The patient has a less atherosclerotic change than would be expected for a patient of this age. 3. 4 mm pulmonary nodule new since 06/16/2022.  Follow-up in 6 months to a year for size stability would be suggested. 4. Moderate size hiatal hernia 5. Hepatic hypodensities statistically favored relate to cysts without worrisome characteristics some are too small to ideally categorize the but without worrisome characteristics the majority stable since at least 01/16/2024 6. Intrahepatic biliary prominence with evidence of cholecystectomy.  Correlation with biliary  enzymes may be of use to exclude a distal obstructive process. Electronically signed by: Kurtis Segundo MD Date:    09/25/2024 Time:    21:24    CT Abdomen Pelvis With IV Contrast NO Oral Contrast    Result Date: 9/25/2024  EXAMINATION: CT ABDOMEN PELVIS WITH IV CONTRAST CLINICAL HISTORY: RLQ abdominal pain (Age >= 14y); TECHNIQUE: Low dose axial images, sagittal and coronal reformations were obtained from the lung bases to the pubic symphysis following the IV administration of 100 mL of Omnipaque 350 oral contrast was not utilized.  Single phase postcontrast CT examination of the abdomen and pelvis is submitted. COMPARISON: CT examination of the abdomen and pelvis September 8, 2024 FINDINGS: Artifact is present, this diminishes the sensitivity of the examination.  The visualized lung bases demonstrate mild motion artifact and mild atelectatic change.  There is a small to moderate hiatal hernia noted.  Gastric wall and fold thickening is noted, nonspecific, seen as an interval change, correlation for gastritis is needed. The gallbladder is surgically absent, appearing to have been resected in the interim since the prior study.  There is mild fluid seen at the gallbladder fossa however without a thick-walled configuration.  This may relate to the appearance of mild free fluid throughout the abdomen and pelvis..  There is biliary dilatation, this may relate to passive biliary dilatation of prior cholecystectomy, clinical and historical correlation is otherwise needed. There is no evidence for peripancreatic inflammatory change and no evidence for pancreatic ductal dilatation.  There is no additional evidence for acute process of the liver, pancreas, spleen or adrenal glands, adrenal gland thickening is again noted appearing stable. Nonobstructing nephrolithiasis of the left kidney is noted.  There is no evidence for ureteral calculus or obstructive uropathy or perinephric inflammatory change bilaterally.  The abdominal  aorta appears normal in caliber, demonstrates appropriate opacification.  The urinary bladder appears unremarkable for degree of distention. There is appearance of circumferential wall thickening involving the hepatic flexure and descending colon and rectosigmoid colon, correlation for colitis is needed.  These segments of the colon demonstrate mild air and stool within the lumen however predominantly decompressed.  There is mild-to-moderate distention of the transverse colon with moderate to prominent distention of the right colon with stool.  There is mild circumferential thickening along the hepatic flexure, correlation for colitis is needed. There is a structure consistent with the appendix, difficult to delineate in its entirety, primary appendiceal inflammatory process is not thought present. There are several dilated small bowel loops in the right lower abdomen, it is possible these small bowel loops are dilated due to the distention of the right colon with stool, however if there is persistent or worsening symptomatology additional follow-up to exclude developing obstruction is recommended.  There is no evidence for free intraperitoneal air.  There is no evidence for pneumatosis or portal venous air or mesenteric venous air. The osseous structures demonstrate chronic change, there is scoliotic curvature of the spine, postoperative change of the spine is noted.  Left hip replacement noted, there is associated artifact.  There is appearance posterior to the left hip of variable attenuation diminished attenuation measuring up to approximately 6.1 x 1.8 cm on axial image 188, this may relate to a fluid collection, this may relate to a seroma or hematoma, however does not appear acute.  Correlation for signs or symptoms of infection is otherwise needed.     Circumferential wall thickening involving the left colon and rectosigmoid colon as well as a segment of the hepatic flexure of the right colon, correlation  for colitis is needed. There is mild to moderate distention along the transverse colon with moderate to prominent distention of the right colon, with stool, potentially secondary to partial obstructive change associated with the aforementioned findings of suspected colitis. Multiple dilated small bowel loops in the right lower abdomen, these may potentially relate to the colonic distention with stool however if there is persistent or worsening symptomatology additional follow-up to exclude developing obstruction is recommended. Mild wall and fold thickening of the stomach, correlation for gastritis is needed. Interval cholecystectomy, biliary dilatation may relate to passive biliary dilatation of prior cholecystectomy, clinical correlation otherwise needed. Mild fluid at the gallbladder fossa does not appear to represent a walled off fluid collection and may relate to mild free fluid of the abdomen and pelvis. Fluid collection associated with the postoperative left hip as discussed above. Additional findings as above. This report was flagged in Epic as abnormal. Electronically signed by: Bill Valadez Date:    09/25/2024 Time:    05:14    X-Ray Chest AP Portable    Result Date: 9/25/2024  EXAMINATION: XR CHEST AP PORTABLE CLINICAL HISTORY: Chest pain, unspecified TECHNIQUE: Single frontal view of the chest was performed. COMPARISON: Chest radiograph September 8, 2024 FINDINGS: Single portable chest view is submitted.  There is diminished depth of inspiration and elevation of the right hemidiaphragm and minimal rotation, when accounting for position and technique and depth of inspiration the appearance of the cardiomediastinal silhouette is stable. Mild atelectatic change noted.  There is no evidence for superimposed confluent infiltrate or consolidation, significant pleural effusion or pneumothorax. The osseous structures demonstrate chronic change.     Diminished depth of inspiration and elevation of the right  hemidiaphragm, and mild atelectatic change noted, there is no additional radiographic evidence for acute intrathoracic process. Electronically signed by: Bill Valadez Date:    09/25/2024 Time:    04:24           Pending Diagnostic Studies:       Procedure Component Value Units Date/Time    Specimen to Pathology - Surgery [[id number]] Collected: 09/27/24 1522    Order Status: Sent Lab Status: No result     Specimen: Tissue            Medications:  Reconciled Home Medications:      Medication List        START taking these medications      dicyclomine 10 MG capsule  Commonly known as: BENTYL  Take 1 capsule (10 mg total) by mouth 3 (three) times daily as needed (abdominal spasms).            CHANGE how you take these medications      nystatin powder  Commonly known as: MYCOSTATIN  Apply to affected area 3 times daily  What changed:   how much to take  how to take this  when to take this            CONTINUE taking these medications      acetaminophen 325 MG tablet  Commonly known as: TYLENOL  Take 2 tablets (650 mg total) by mouth every 8 (eight) hours as needed for Pain (pain scale 1-4).     amlodipine-benazepril 5-20 mg 5-20 mg per capsule  Commonly known as: LOTREL  Take 1 capsule by mouth once daily.     MAXIMILIANO-GEST ANTACID 200 mg calcium (500 mg) chewable tablet  Generic drug: calcium carbonate  Chew and swallow 2 tablets (1,000 mg total) by mouth once daily.     cyclobenzaprine 10 MG tablet  Commonly known as: FLEXERIL  Take 1 tablet (10 mg total) by mouth 3 (three) times daily as needed for Muscle spasms.     docusate sodium 100 MG capsule  Commonly known as: COLACE  Take 1 capsule (100 mg total) by mouth 2 (two) times daily.     doxycycline 100 MG Cap  Commonly known as: VIBRAMYCIN  Take 100 mg by mouth every 12 (twelve) hours.     EScitalopram oxalate 20 MG tablet  Commonly known as: LEXAPRO  Take 1 tablet (20 mg total) by mouth every evening.     GAVILAX 17 gram/dose powder  Generic drug: polyethylene  glycol  Use to cap to measure 17 grams, mix with liquid, and take by mouth once daily.     levETIRAcetam 500 MG Tab  Commonly known as: KEPPRA  Take 1 tablet (500 mg total) by mouth 2 (two) times daily.     omeprazole 40 MG capsule  Commonly known as: PRILOSEC  TAKE 1 CAPSULE(40 MG) BY MOUTH EVERY MORNING     OPW TEST CLAIM - DO NOT FILL  OPW test claim. Do not fill.     oxyCODONE-acetaminophen  mg per tablet  Commonly known as: PERCOCET  Take 1 tablet by mouth every 4 (four) hours as needed for Pain.     pregabalin 200 MG Cap  Commonly known as: LYRICA  Take 1 capsule (200 mg total) by mouth 3 (three) times daily.     VITAMIN D2 1,250 mcg (50,000 unit) capsule  Generic drug: ergocalciferol  Take 1 capsule (50,000 Units total) by mouth every 7 days.            STOP taking these medications      sodium chloride 0.9% SolP 50 mL with DAPTOmycin 500 mg SolR 676 mg              Indwelling Lines/Drains at time of discharge:   none           Time spent on the discharge of patient: 37 minutes         Maksim Blum MD  Department of Hospital Medicine  [facility]

## 2024-09-28 NOTE — NURSING
Took out IV, removed telly monitor. Gave pt. Discharge paper work and went over it with pt. Took pt down stairs via wheelchair to family member auto assisted pt into vehicle without incident.

## 2024-10-03 ENCOUNTER — TELEPHONE (OUTPATIENT)
Dept: FAMILY MEDICINE | Facility: CLINIC | Age: 79
End: 2024-10-03

## 2024-10-03 NOTE — PHYSICIAN QUERY
Please document your best medical opinion regarding the etiology of Colitis as well as specificity.   Other etiology (please specify): unspecified colitis

## 2024-10-11 ENCOUNTER — TELEPHONE (OUTPATIENT)
Dept: FAMILY MEDICINE | Facility: CLINIC | Age: 79
End: 2024-10-11
Payer: MEDICARE

## 2024-11-25 ENCOUNTER — HOSPITAL ENCOUNTER (EMERGENCY)
Facility: HOSPITAL | Age: 79
Discharge: HOME | End: 2024-11-25
Attending: STUDENT IN AN ORGANIZED HEALTH CARE EDUCATION/TRAINING PROGRAM
Payer: MEDICARE

## 2024-11-25 VITALS
WEIGHT: 130 LBS | DIASTOLIC BLOOD PRESSURE: 55 MMHG | RESPIRATION RATE: 19 BRPM | OXYGEN SATURATION: 100 % | BODY MASS INDEX: 20.89 KG/M2 | HEIGHT: 66 IN | SYSTOLIC BLOOD PRESSURE: 112 MMHG | HEART RATE: 88 BPM | TEMPERATURE: 99 F

## 2024-11-25 DIAGNOSIS — K52.9 COLITIS: Primary | ICD-10-CM

## 2024-11-25 DIAGNOSIS — R10.9 ABDOMINAL PAIN: ICD-10-CM

## 2024-11-25 DIAGNOSIS — K40.91 UNILATERAL RECURRENT INGUINAL HERNIA WITHOUT OBSTRUCTION OR GANGRENE: ICD-10-CM

## 2024-11-25 LAB
ALBUMIN SERPL BCP-MCNC: 3.8 G/DL (ref 3.5–5.2)
ALP SERPL-CCNC: 620 U/L (ref 55–135)
ALT SERPL W/O P-5'-P-CCNC: 12 U/L (ref 10–44)
ANION GAP SERPL CALC-SCNC: 8 MMOL/L (ref 8–16)
AST SERPL-CCNC: 14 U/L (ref 10–40)
BASOPHILS # BLD AUTO: 0.02 K/UL (ref 0–0.2)
BASOPHILS NFR BLD: 0.2 % (ref 0–1.9)
BILIRUB SERPL-MCNC: 1 MG/DL (ref 0.1–1)
BILIRUB UR QL STRIP: NEGATIVE
BUN SERPL-MCNC: 26 MG/DL (ref 8–23)
CALCIUM SERPL-MCNC: 9.1 MG/DL (ref 8.7–10.5)
CHLORIDE SERPL-SCNC: 101 MMOL/L (ref 95–110)
CLARITY UR: CLEAR
CO2 SERPL-SCNC: 27 MMOL/L (ref 23–29)
COLOR UR: YELLOW
CREAT SERPL-MCNC: 0.9 MG/DL (ref 0.5–1.4)
CREAT SERPL-MCNC: 1 MG/DL (ref 0.5–1.4)
DIFFERENTIAL METHOD BLD: ABNORMAL
EOSINOPHIL # BLD AUTO: 0 K/UL (ref 0–0.5)
EOSINOPHIL NFR BLD: 0.1 % (ref 0–8)
ERYTHROCYTE [DISTWIDTH] IN BLOOD BY AUTOMATED COUNT: 14.5 % (ref 11.5–14.5)
EST. GFR  (NO RACE VARIABLE): >60 ML/MIN/1.73 M^2
GLUCOSE SERPL-MCNC: 112 MG/DL (ref 70–110)
GLUCOSE UR QL STRIP: NEGATIVE
HCT VFR BLD AUTO: 26.5 % (ref 37–48.5)
HGB BLD-MCNC: 7.8 G/DL (ref 12–16)
HGB UR QL STRIP: NEGATIVE
IMM GRANULOCYTES # BLD AUTO: 0.04 K/UL (ref 0–0.04)
IMM GRANULOCYTES NFR BLD AUTO: 0.5 % (ref 0–0.5)
KETONES UR QL STRIP: NEGATIVE
LEUKOCYTE ESTERASE UR QL STRIP: NEGATIVE
LIPASE SERPL-CCNC: 5 U/L (ref 4–60)
LYMPHOCYTES # BLD AUTO: 0.8 K/UL (ref 1–4.8)
LYMPHOCYTES NFR BLD: 8.8 % (ref 18–48)
MCH RBC QN AUTO: 24.1 PG (ref 27–31)
MCHC RBC AUTO-ENTMCNC: 29.4 G/DL (ref 32–36)
MCV RBC AUTO: 82 FL (ref 82–98)
MONOCYTES # BLD AUTO: 0.7 K/UL (ref 0.3–1)
MONOCYTES NFR BLD: 8.4 % (ref 4–15)
NEUTROPHILS # BLD AUTO: 7.1 K/UL (ref 1.8–7.7)
NEUTROPHILS NFR BLD: 82 % (ref 38–73)
NITRITE UR QL STRIP: NEGATIVE
NRBC BLD-RTO: 0 /100 WBC
PH UR STRIP: 8 [PH] (ref 5–8)
PLATELET # BLD AUTO: 496 K/UL (ref 150–450)
PMV BLD AUTO: 9.7 FL (ref 9.2–12.9)
POTASSIUM SERPL-SCNC: 4.4 MMOL/L (ref 3.5–5.1)
PROT SERPL-MCNC: 7 G/DL (ref 6–8.4)
PROT UR QL STRIP: NEGATIVE
RBC # BLD AUTO: 3.23 M/UL (ref 4–5.4)
SAMPLE: NORMAL
SODIUM SERPL-SCNC: 136 MMOL/L (ref 136–145)
SP GR UR STRIP: 1.02 (ref 1–1.03)
URN SPEC COLLECT METH UR: NORMAL
UROBILINOGEN UR STRIP-ACNC: NEGATIVE EU/DL
WBC # BLD AUTO: 8.66 K/UL (ref 3.9–12.7)

## 2024-11-25 PROCEDURE — 25000003 PHARM REV CODE 250: Performed by: STUDENT IN AN ORGANIZED HEALTH CARE EDUCATION/TRAINING PROGRAM

## 2024-11-25 PROCEDURE — 83690 ASSAY OF LIPASE: CPT | Performed by: STUDENT IN AN ORGANIZED HEALTH CARE EDUCATION/TRAINING PROGRAM

## 2024-11-25 PROCEDURE — 86803 HEPATITIS C AB TEST: CPT | Performed by: STUDENT IN AN ORGANIZED HEALTH CARE EDUCATION/TRAINING PROGRAM

## 2024-11-25 PROCEDURE — 93010 ELECTROCARDIOGRAM REPORT: CPT | Mod: ,,, | Performed by: INTERNAL MEDICINE

## 2024-11-25 PROCEDURE — 81003 URINALYSIS AUTO W/O SCOPE: CPT | Performed by: STUDENT IN AN ORGANIZED HEALTH CARE EDUCATION/TRAINING PROGRAM

## 2024-11-25 PROCEDURE — 85025 COMPLETE CBC W/AUTO DIFF WBC: CPT | Performed by: STUDENT IN AN ORGANIZED HEALTH CARE EDUCATION/TRAINING PROGRAM

## 2024-11-25 PROCEDURE — 93005 ELECTROCARDIOGRAM TRACING: CPT | Performed by: INTERNAL MEDICINE

## 2024-11-25 PROCEDURE — 96374 THER/PROPH/DIAG INJ IV PUSH: CPT

## 2024-11-25 PROCEDURE — 82565 ASSAY OF CREATININE: CPT

## 2024-11-25 PROCEDURE — 25500020 PHARM REV CODE 255: Performed by: STUDENT IN AN ORGANIZED HEALTH CARE EDUCATION/TRAINING PROGRAM

## 2024-11-25 PROCEDURE — 80053 COMPREHEN METABOLIC PANEL: CPT | Performed by: STUDENT IN AN ORGANIZED HEALTH CARE EDUCATION/TRAINING PROGRAM

## 2024-11-25 PROCEDURE — 87389 HIV-1 AG W/HIV-1&-2 AB AG IA: CPT | Performed by: STUDENT IN AN ORGANIZED HEALTH CARE EDUCATION/TRAINING PROGRAM

## 2024-11-25 PROCEDURE — 63600175 PHARM REV CODE 636 W HCPCS: Performed by: STUDENT IN AN ORGANIZED HEALTH CARE EDUCATION/TRAINING PROGRAM

## 2024-11-25 PROCEDURE — 99285 EMERGENCY DEPT VISIT HI MDM: CPT | Mod: 25

## 2024-11-25 RX ORDER — MORPHINE SULFATE 4 MG/ML
4 INJECTION, SOLUTION INTRAMUSCULAR; INTRAVENOUS ONCE
Status: COMPLETED | OUTPATIENT
Start: 2024-11-25 | End: 2024-11-25

## 2024-11-25 RX ORDER — AMOXICILLIN AND CLAVULANATE POTASSIUM 875; 125 MG/1; MG/1
1 TABLET, FILM COATED ORAL
Status: COMPLETED | OUTPATIENT
Start: 2024-11-25 | End: 2024-11-25

## 2024-11-25 RX ORDER — AMOXICILLIN AND CLAVULANATE POTASSIUM 875; 125 MG/1; MG/1
1 TABLET, FILM COATED ORAL 2 TIMES DAILY
Qty: 14 TABLET | Refills: 0 | Status: ON HOLD | OUTPATIENT
Start: 2024-11-25 | End: 2024-11-30 | Stop reason: HOSPADM

## 2024-11-25 RX ADMIN — IOHEXOL 100 ML: 350 INJECTION, SOLUTION INTRAVENOUS at 02:11

## 2024-11-25 RX ADMIN — MORPHINE SULFATE 4 MG: 4 INJECTION, SOLUTION INTRAMUSCULAR; INTRAVENOUS at 03:11

## 2024-11-25 RX ADMIN — AMOXICILLIN AND CLAVULANATE POTASSIUM 1 TABLET: 875; 125 TABLET, FILM COATED ORAL at 03:11

## 2024-11-25 NOTE — DISCHARGE INSTRUCTIONS
Please return to emergency department if you have new or worsening symptoms.  Follow up with primary care in 3-5 days.  Begin taking Augmentin twice daily for 7 days.

## 2024-11-25 NOTE — ED PROVIDER NOTES
Encounter Date: 11/25/2024       History     Chief Complaint   Patient presents with    Abdominal Pain     Lower left quad for 3 days. + nuasea, + fever at home, + burning with urination     Dysuria     Patient took percocet-5 before coming to ER (has chronic back pain) and hx of colitis     HPI    Froilan Ray is a 79 y.o. female with a past medical history of hypertension, IBS, depression, and a recent cholecystectomy that presents emergency department for left lower quadrant abdominal pain x3 days associated with nausea.  Does endorse burning with urination in his concern that she has a UTI.  Initially told triage nurse that she had a fever, but denies that with me.  She was also recently admitted to the hospital on 9/25 through 9/28 for colitis and there was concern for possible early bowel obstruction at that time.  She does have a hernia in the left lower quadrant but is continuing to have bowel movements.  Last bowel movement was 2 days ago and she is passing gas.  Not having any episodes of vomiting.  Denies chest pain, shortness of breath, fevers, numbness, weakness, headache, dizziness, and leg swelling.    Review of patient's allergies indicates:  No Known Allergies  Past Medical History:   Diagnosis Date    Anemia     Arthritis     Bleeding ulcer 07/2016    Chronic pain     DDD (degenerative disc disease), cervical     DDD (degenerative disc disease), lumbar     Depression     Disc degeneration, lumbosacral     Diverticulitis     Encounter for blood transfusion     Hypertension     IBS (irritable bowel syndrome)     Neuropathy of both feet     Seizures     none  since 2017    Umbilical hernia 2020     Past Surgical History:   Procedure Laterality Date    ARTHROPLASTY, HIP, GIRDLESTONE, POSTERIOR APPROACH Left 1/19/2024    Procedure: ARTHROPLASTY, HIP, GIRDLESTONE, POSTERIOR APPROACH;  Surgeon: Bulmaro Tellez MD;  Location: [facility] OR [address] [place] [address];  Service: Orthopedics;  Laterality: Left;     ARTHROPLASTY, HIP, GIRDLESTONE, POSTERIOR APPROACH Left 1/25/2024    Procedure: Irrigation and debridement left hip,;  Surgeon: Juanito Painting MD;  Location: [facility] OR 2ND FLR;  Service: Orthopedics;  Laterality: Left;    ARTHROPLASTY, HIP, GIRDLESTONE, POSTERIOR APPROACH Left 2/15/2024    Procedure: Revision hip antibiotic spacer head exchange, left, lateral, peg board, depuy osteomed in room, vanc, ancef, txa,;  Surgeon: Bulmaro Tellez MD;  Location: [facility] OR 2ND FLR;  Service: Orthopedics;  Laterality: Left;    BACK SURGERY      BREAST BIOPSY      BREAST SURGERY Right     lumpectomy    CAUDAL EPIDURAL STEROID INJECTION N/A 01/28/2022    Procedure: Injection-steroid-epidural-caudal;  Surgeon: Luzmaria Marcelino MD;  Location: [facility] OR;  Service: Pain Management;  Laterality: N/A;    COLONOSCOPY N/A 01/14/2022    Procedure: COLONOSCOPY;  Surgeon: Abby Landers MD;  Location: [facility];  Service: Endoscopy;  Laterality: N/A;    ENDOSCOPIC ULTRASOUND OF UPPER GASTROINTESTINAL TRACT N/A 07/21/2020    Procedure: ULTRASOUND, UPPER GI TRACT, ENDOSCOPIC;  Surgeon: Marcio Nguyễn III, MD;  Location: [facility];  Service: Endoscopy;  Laterality: N/A;    ENDOSCOPIC ULTRASOUND OF UPPER GASTROINTESTINAL TRACT N/A 9/27/2024    Procedure: ULTRASOUND, UPPER GI TRACT, ENDOSCOPIC;  Surgeon: Marcio Nguyễn III, MD;  Location: [facility];  Service: Endoscopy;  Laterality: N/A;    ESOPHAGOGASTRODUODENOSCOPY N/A 01/14/2022    Procedure: EGD (ESOPHAGOGASTRODUODENOSCOPY);  Surgeon: Abby Landers MD;  Location: [facility] ENDO;  Service: Endoscopy;  Laterality: N/A;    ESOPHAGOGASTRODUODENOSCOPY N/A 06/10/2022    Procedure: EGD (ESOPHAGOGASTRODUODENOSCOPY);  Surgeon: Abby Landers MD;  Location: [facility] ENDO;  Service: Endoscopy;  Laterality: N/A;    EYE SURGERY      cataract    FLAP GRAFT, LOCAL Left 2/15/2024    Procedure: FLAP GRAFT, LOCAL;  Surgeon: Bulmaro Tellez MD;  Location: [facility] OR 2ND FLR;  Service:  Orthopedics;  Laterality: Left;    HYSTERECTOMY      INTRAMEDULLARY RODDING OF TROCHANTER OF FEMUR Left 12/11/2018    Procedure: INSERTION, INTRAMEDULLARY SANTOS, FEMUR, TROCHANTER;  Surgeon: Eulalio De La Cruz MD;  Location: [facility] OR;  Service: Orthopedics;  Laterality: Left;    IRRIGATION AND DEBRIDEMENT OF LOWER EXTREMITY Left 1/19/2024    Procedure: IRRIGATION AND DEBRIDEMENT, LOWER EXTREMITY;  Surgeon: Bulmaro Tellez MD;  Location: [facility] OR 2ND FLR;  Service: Orthopedics;  Laterality: Left;    IRRIGATION AND DEBRIDEMENT OF LOWER EXTREMITY Left 2/15/2024    Procedure: IRRIGATION AND DEBRIDEMENT, LOWER EXTREMITY;  Surgeon: Bulmaro Tellez MD;  Location: [facility] OR 2ND FLR;  Service: Orthopedics;  Laterality: Left;    LAPAROSCOPIC CHOLECYSTECTOMY N/A 9/8/2024    Procedure: CHOLECYSTECTOMY, LAPAROSCOPIC;  Surgeon: Cipriano Ambrosio MD;  Location: [facility] OR;  Service: General;  Laterality: N/A;    REPAIR OF EPIGASTRIC HERNIA N/A 12/7/2023    Procedure: REPAIR, HERNIA, EPIGASTRIC;  Surgeon: Vipul Escobar MD;  Location: [facility] OR;  Service: General;  Laterality: N/A;    SPINAL CORD STIMULATOR IMPLANT  09/18/2013    and removal    SPINE SURGERY  2006    lumbar L2-S1 decompression.    SPINE SURGERY      cervical decompression    TONSILLECTOMY       Family History   Problem Relation Name Age of Onset    Diabetes Mother      Hypertension Mother      Irritable bowel syndrome Mother      Diabetes Father          insulin dependent    Hypertension Father      Heart disease Father      Coronary artery disease Father      Depression Father      Diabetes Sister      Diabetes Brother      COPD Brother       Social History     Tobacco Use    Smoking status: Never    Smokeless tobacco: Never   Substance Use Topics    Alcohol use: No    Drug use: No     Review of Systems   Constitutional:  Negative for fever.   HENT:  Negative for sore throat.    Respiratory:  Negative for shortness of breath.    Cardiovascular:  Negative for chest  pain.   Gastrointestinal:  Positive for abdominal pain and nausea. Negative for constipation, diarrhea and vomiting.   Genitourinary:  Positive for dysuria. Negative for frequency and hematuria.   Musculoskeletal:  Negative for back pain and neck pain.   Skin:  Negative for rash.   Neurological:  Negative for dizziness, weakness, numbness and headaches.   Hematological:  Does not bruise/bleed easily.       Physical Exam     Initial Vitals [11/25/24 1249]   BP Pulse Resp Temp SpO2   109/63 97 18 99 °F (37.2 °C) 95 %      MAP       --         Physical Exam    Nursing note and vitals reviewed.  Constitutional: She appears well-developed and well-nourished.   HENT:   Head: Normocephalic and atraumatic.   Eyes: EOM are normal. Pupils are equal, round, and reactive to light.   Neck:   Normal range of motion.  Cardiovascular:  Normal rate, regular rhythm and normal heart sounds.           Pulmonary/Chest: Breath sounds normal. No respiratory distress. She has no wheezes. She has no rhonchi. She has no rales.   Abdominal: Abdomen is soft. She exhibits mass (left inguinal hernia that is easily reducible. No overlying skin changes). She exhibits no distension. There is abdominal tenderness (suprapubic). There is no rebound.   Musculoskeletal:         General: Normal range of motion.      Cervical back: Normal range of motion.     Neurological: She is alert and oriented to person, place, and time. She has normal strength. No cranial nerve deficit or sensory deficit. GCS score is 15. GCS eye subscore is 4. GCS verbal subscore is 5. GCS motor subscore is 6.   Skin: Capillary refill takes less than 2 seconds. No rash noted.   Psychiatric: She has a normal mood and affect. Thought content normal.         ED Course   Procedures  Labs Reviewed   CBC W/ AUTO DIFFERENTIAL - Abnormal       Result Value    WBC 8.66      RBC 3.23 (*)     Hemoglobin 7.8 (*)     Hematocrit 26.5 (*)     MCV 82      MCH 24.1 (*)     MCHC 29.4 (*)     RDW  14.5      Platelets 496 (*)     MPV 9.7      Immature Granulocytes 0.5      Gran # (ANC) 7.1      Immature Grans (Abs) 0.04      Lymph # 0.8 (*)     Mono # 0.7      Eos # 0.0      Baso # 0.02      nRBC 0      Gran % 82.0 (*)     Lymph % 8.8 (*)     Mono % 8.4      Eosinophil % 0.1      Basophil % 0.2      Differential Method Automated      Narrative:     Release to patient->Immediate   COMPREHENSIVE METABOLIC PANEL - Abnormal    Sodium 136      Potassium 4.4      Chloride 101      CO2 27      Glucose 112 (*)     BUN 26 (*)     Creatinine 0.9      Calcium 9.1      Total Protein 7.0      Albumin 3.8      Total Bilirubin 1.0      Alkaline Phosphatase 620 (*)     AST 14      ALT 12      eGFR >60.0      Anion Gap 8      Narrative:     Release to patient->Immediate   LIPASE    Lipase 5      Narrative:     Release to patient->Immediate   URINALYSIS, REFLEX TO URINE CULTURE    Specimen UA Urine, Clean Catch      Color, UA Yellow      Appearance, UA Clear      pH, UA 8.0      Specific Gravity, UA 1.020      Protein, UA Negative      Glucose, UA Negative      Ketones, UA Negative      Bilirubin (UA) Negative      Occult Blood UA Negative      Nitrite, UA Negative      Urobilinogen, UA Negative      Leukocytes, UA Negative      Narrative:     Specimen Source->Urine   HEPATITIS C ANTIBODY   HIV 1 / 2 ANTIBODY   ISTAT CREATININE    POC Creatinine 1.0      Sample VENOUS          ECG Results              EKG 12-lead (In process)        Collection Time Result Time QRS Duration OHS QTC Calculation    11/25/24 13:49:09 11/25/24 14:19:49 78 430                     In process by Interface, Lab In [facility] (11/25/24 14:19:57)                   Narrative:    Test Reason : R10.9,    Vent. Rate :  84 BPM     Atrial Rate :  84 BPM     P-R Int : 158 ms          QRS Dur :  78 ms      QT Int : 364 ms       P-R-T Axes :  74  15  76 degrees    QTcB Int : 430 ms    Normal sinus rhythm  Normal ECG  No previous ECGs available    Referred By:  AAAREFERRAL SELF           Confirmed By:                                   Imaging Results              CT Abdomen Pelvis With IV Contrast NO Oral Contrast (Final result)  Result time 11/25/24 14:36:43      Final result by Kristy Angel MD (11/25/24 14:36:43)                   Impression:      Slight improvement of the wall thickening of the left colon and sigmoid colon suggestive of colitis    Moderate amount of fecal material in the right colon    Prior cholecystectomy with stable biliary duct dilatation    Stable mild ascites    Multiple subcentimeter hepatic cysts    Left hip arthroplasty with posterior left hip effusion    Degenerative changes of the spine    Moderate size hiatal hernia      Electronically signed by: Kristy Angel  Date:    11/25/2024  Time:    14:36               Narrative:      CMS MANDATED QUALITY DATA - CT RADIATION - 436    All CT scans at this facility utilize dose modulation, iterative reconstruction, and/or weight based dosing when appropriate to reduce radiation dose to as low as reasonably achievable.    CLINICAL HISTORY:  ([id number])78 y/o  (1945) F    LLQ abdominal pain;    TECHNIQUE:  (A#[id number], exam time 11/25/2024 14:29)    CT ABDOMEN PELVIS WITH IV CONTRAST [id number]    Axial CT images of the abdomen and pelvis were obtained from the dome of the diaphragm to the proximal thighs.    100cc omnipague 350 IV contrast was given    COMPARISON:  09/25/2024    FINDINGS:  Lower Thorax:    The lung bases are clear. The heart is normal in size.  There is calcification of the mitral annulus.    There is a moderate size hiatal hernia.    CT Abdomen:    Liver: The liver is normal in size and attenuation.  There are multiple small hepatic cysts.  There is stable intrahepatic and extrahepatic biliary duct dilatation.    Gallbladder: Gallbladder is absent.    Spleen: Normal.    Pancreas: The pancreas is normal.    Adrenal Glands: Normal    Kidneys: The kidneys are normal in  imaging appearance without hydronephrosis or hydroureter.    Vasculature: The aorta is normal in caliber without evidence of aneurysm.    Lymph nodes: No abdominal lymphadenopathy is seen.    Intraperitoneal structures: There is mild ascites.    Bowel: The stomach and small bowel are normal.    There is slight improvement mild wall thickening of the descending colon and sigmoid colon suggestive of colitis.  There is a moderate amount of fecal material within the cecum and right colon.    There is no pneumatosis    Abdominal wall: The abdominal wall and musculature are normal.    Musculoskeletal: Levoscoliosis with multilevel degenerative disc disease.  There is a left hip arthroplasty with superior migration.  There is stable fluid collection along the posterior aspect of the left hip.    CT Pelvis:    Bladder: Normal.    Reproductive Organs: The uterus and ovaries are unremarkable.    Pelvic Lymph nodes: No pelvic lymphadenopathy or pelvic mass is identified.                                       Medications   morphine injection 4 mg (4 mg Intravenous Given 11/25/24 1556)   iohexoL (OMNIPAQUE 350) injection 100 mL (100 mLs Intravenous Given 11/25/24 1421)   amoxicillin-clavulanate 875-125mg per tablet 1 tablet (1 tablet Oral Given 11/25/24 1556)     Medical Decision Making  Froilan Ray is a 79 y.o. female with a past medical history of hypertension, IBS, depression, and a recent cholecystectomy that presents emergency department for left lower quadrant abdominal pain x3 days associated with nausea.  Vitals are stable.  Nontoxic female.  Left lower quadrant tenderness.  There is an left-sided inguinal hernia which is easily reducible.  No peritoneal signs.  No overlying skin changes.  Differential includes but is not limited to UTI, pyelonephritis, kidney stone, diverticulitis, colitis, and constipation.  UA without evidence of infection.  CBC with hemoglobin of 7.8 which is only slightly less than what she  has previously been at which was 8.2.  No leukocytosis.  CMP unremarkable.  CT of the abdomen shows evidence of colitis although it seems to be improving from previous scan.  There is left hip arthroplasty with posterior left hip effusion.  Also large volume of stool.  Patient was currently being followed by Orthopedic surgery for this in his on doxycycline.  Given morphine and Augmentin here.  Symptomatically improved.  Stable for discharge at this time.  Return precautions given.  Started on Augmentin.  Referred to General surgery for her hernia.  Instructed to take MiraLax for constipation.    Amount and/or Complexity of Data Reviewed  Labs: ordered.  Radiology: ordered.    Risk  Prescription drug management.                                      Clinical Impression:  Final diagnoses:  [R10.9] Abdominal pain  [K52.9] Colitis (Primary)  [K40.91] Unilateral recurrent inguinal hernia without obstruction or gangrene          ED Disposition Condition    Discharge Stable          ED Prescriptions       Medication Sig Dispense Start Date End Date Auth. Provider    amoxicillin-clavulanate 875-125mg (AUGMENTIN) 875-125 mg per tablet Take 1 tablet by mouth 2 (two) times daily. for 7 days 14 tablet 11/25/2024 12/2/2024 Price Blank MD          Follow-up Information       Follow up With Specialties Details Why Contact Info Additional Information    [facility] - Emergency Dept Emergency Medicine  As needed, If symptoms worsen [address] [id number] [phone] 1st floor    Alphonse Boothe III, MD Family Medicine Call in 2 days Follow-up on ED visit [address]  [address]  [facility] [address]  [phone]                Price Blank MD  11/25/24 [id number]

## 2024-11-25 NOTE — PHARMACY MED REC
"Admission Medication History     The home medication history was taken by Talib Fernández.    You may go to "Admission" then "Reconcile Home Medications" tabs to review and/or act upon these items.     The home medication list has been updated by the Pharmacy department.   Please read ALL comments highlighted in yellow.   Please address this information as you see fit.    Feel free to contact us if you have any questions or require assistance.      The medications listed below were removed from the home medication list. Please reorder if appropriate:  Patient reports no longer taking the following medication(s):  Ergocalciferol 50,000 units  Lexapro 20 mg  Nystatin Powder  Omeprazole 40 mg  Glycolax    Medications listed below were obtained from: Patient/family and Analytic software- [employer]  No current facility-administered medications on file prior to encounter.     Current Outpatient Medications on File Prior to Encounter   Medication Sig Dispense Refill    acetaminophen (TYLENOL) 325 MG tablet Take 2 tablets (650 mg total) by mouth every 8 (eight) hours as needed for Pain (pain scale 1-4).  0    amlodipine-benazepril 5-20 mg (LOTREL) 5-20 mg per capsule Take 1 capsule by mouth once daily. 90 capsule 3    calcium carbonate (TUMS) 200 mg calcium (500 mg) chewable tablet Chew and swallow 2 tablets (1,000 mg total) by mouth once daily. (Patient taking differently: Take 1,000 mg by mouth daily as needed for Heartburn.) 60 tablet 11    cyclobenzaprine (FLEXERIL) 10 MG tablet Take 1 tablet (10 mg total) by mouth 3 (three) times daily as needed for Muscle spasms.      docusate sodium (COLACE) 100 MG capsule Take 1 capsule (100 mg total) by mouth 2 (two) times daily. 60 capsule 1    doxycycline (VIBRAMYCIN) 100 MG Cap Take 100 mg by mouth every 12 (twelve) hours.      levETIRAcetam (KEPPRA) 500 MG Tab Take 1 tablet (500 mg total) by mouth 2 (two) times daily. 180 tablet 1    oxyCODONE-acetaminophen (PERCOCET)  mg " per tablet Take 1 tablet by mouth every 4 (four) hours as needed for Pain. 12 tablet 0    pregabalin (LYRICA) 200 MG Cap Take 1 capsule (200 mg total) by mouth 3 (three) times daily. 90 capsule 0    [DISCONTINUED] ergocalciferol (ERGOCALCIFEROL) 50,000 unit Cap Take 1 capsule (50,000 Units total) by mouth every 7 days. 15 capsule 2    [DISCONTINUED] EScitalopram oxalate (LEXAPRO) 20 MG tablet Take 1 tablet (20 mg total) by mouth every evening.      [DISCONTINUED] nystatin (MYCOSTATIN) powder Apply to affected area 3 times daily (Patient taking differently: Apply 1 g topically 3 (three) times daily. Apply to affected area 3 times daily) 30 g 0    [DISCONTINUED] omeprazole (PRILOSEC) 40 MG capsule TAKE 1 CAPSULE(40 MG) BY MOUTH EVERY MORNING 90 capsule 1    [DISCONTINUED] OPW TEST CLAIM - DO NOT FILL OPW test claim. Do not fill. 1 tablet 0    [DISCONTINUED] pantoprazole (PROTONIX) 40 MG tablet Take 1 tablet (40 mg total) by mouth 2 (two) times daily. 60 tablet 4    [DISCONTINUED] polyethylene glycol (GLYCOLAX) 17 gram/dose powder Use to cap to measure 17 grams, mix with liquid, and take by mouth once daily. (Patient not taking: Reported on 11/25/2024) 510 g 1         Talib Fernández  EXT [id number]                .

## 2024-11-26 LAB
OHS QRS DURATION: 78 MS
OHS QTC CALCULATION: 430 MS

## 2024-11-27 ENCOUNTER — HOSPITAL ENCOUNTER (INPATIENT)
Facility: HOSPITAL | Age: 79
LOS: 2 days | Discharge: HOME HEALTH | DRG: 812 | End: 2024-11-30
Attending: EMERGENCY MEDICINE | Admitting: FAMILY MEDICINE
Payer: MEDICARE

## 2024-11-27 DIAGNOSIS — D64.9 ANEMIA, UNSPECIFIED TYPE: ICD-10-CM

## 2024-11-27 DIAGNOSIS — D64.9 SYMPTOMATIC ANEMIA: ICD-10-CM

## 2024-11-27 DIAGNOSIS — R10.32 LEFT LOWER QUADRANT ABDOMINAL PAIN: Primary | ICD-10-CM

## 2024-11-27 DIAGNOSIS — K52.9 COLITIS: ICD-10-CM

## 2024-11-27 DIAGNOSIS — N17.9 AKI (ACUTE KIDNEY INJURY): ICD-10-CM

## 2024-11-27 DIAGNOSIS — I95.9 HYPOTENSION: ICD-10-CM

## 2024-11-27 DIAGNOSIS — I95.9 HYPOTENSION, UNSPECIFIED HYPOTENSION TYPE: ICD-10-CM

## 2024-11-27 DIAGNOSIS — R07.9 CHEST PAIN: ICD-10-CM

## 2024-11-27 PROBLEM — E87.1 HYPONATREMIA: Status: ACTIVE | Noted: 2024-11-27

## 2024-11-27 PROBLEM — Z87.898 HISTORY OF BACTEREMIA: Status: ACTIVE | Noted: 2024-01-18

## 2024-11-27 LAB
ABO + RH BLD: NORMAL
ALBUMIN SERPL BCP-MCNC: 3.5 G/DL (ref 3.5–5.2)
ALP SERPL-CCNC: 694 U/L (ref 55–135)
ALT SERPL W/O P-5'-P-CCNC: 11 U/L (ref 10–44)
ANION GAP SERPL CALC-SCNC: 8 MMOL/L (ref 8–16)
AST SERPL-CCNC: 16 U/L (ref 10–40)
BASOPHILS # BLD AUTO: 0.05 K/UL (ref 0–0.2)
BASOPHILS NFR BLD: 0.4 % (ref 0–1.9)
BILIRUB SERPL-MCNC: 1.1 MG/DL (ref 0.1–1)
BLD GP AB SCN CELLS X3 SERPL QL: NORMAL
BLD PROD TYP BPU: NORMAL
BLOOD UNIT EXPIRATION DATE: NORMAL
BLOOD UNIT TYPE CODE: 7300
BLOOD UNIT TYPE: NORMAL
BUN SERPL-MCNC: 40 MG/DL (ref 8–23)
CALCIUM SERPL-MCNC: 9 MG/DL (ref 8.7–10.5)
CHLORIDE SERPL-SCNC: 96 MMOL/L (ref 95–110)
CO2 SERPL-SCNC: 26 MMOL/L (ref 23–29)
CODING SYSTEM: NORMAL
CREAT SERPL-MCNC: 1.9 MG/DL (ref 0.5–1.4)
CREAT SERPL-MCNC: 2 MG/DL (ref 0.5–1.4)
CROSSMATCH INTERPRETATION: NORMAL
DIFFERENTIAL METHOD BLD: ABNORMAL
DISPENSE STATUS: NORMAL
EOSINOPHIL # BLD AUTO: 0.1 K/UL (ref 0–0.5)
EOSINOPHIL NFR BLD: 1 % (ref 0–8)
ERYTHROCYTE [DISTWIDTH] IN BLOOD BY AUTOMATED COUNT: 14.2 % (ref 11.5–14.5)
EST. GFR  (NO RACE VARIABLE): 26.9 ML/MIN/1.73 M^2
GLUCOSE SERPL-MCNC: 97 MG/DL (ref 70–110)
HCT VFR BLD AUTO: 21.6 % (ref 37–48.5)
HGB BLD-MCNC: 6.4 G/DL (ref 12–16)
IMM GRANULOCYTES # BLD AUTO: 0.09 K/UL (ref 0–0.04)
IMM GRANULOCYTES NFR BLD AUTO: 0.8 % (ref 0–0.5)
LDH SERPL L TO P-CCNC: 1.05 MMOL/L (ref 0.5–2.2)
LYMPHOCYTES # BLD AUTO: 1.2 K/UL (ref 1–4.8)
LYMPHOCYTES NFR BLD: 10.1 % (ref 18–48)
MCH RBC QN AUTO: 24.3 PG (ref 27–31)
MCHC RBC AUTO-ENTMCNC: 29.6 G/DL (ref 32–36)
MCV RBC AUTO: 82 FL (ref 82–98)
MONOCYTES # BLD AUTO: 0.8 K/UL (ref 0.3–1)
MONOCYTES NFR BLD: 7.2 % (ref 4–15)
NEUTROPHILS # BLD AUTO: 9.3 K/UL (ref 1.8–7.7)
NEUTROPHILS NFR BLD: 80.5 % (ref 38–73)
NRBC BLD-RTO: 0 /100 WBC
NUM UNITS TRANS PACKED RBC: NORMAL
PLATELET # BLD AUTO: 473 K/UL (ref 150–450)
PMV BLD AUTO: 9.8 FL (ref 9.2–12.9)
POTASSIUM SERPL-SCNC: 4.5 MMOL/L (ref 3.5–5.1)
PROT SERPL-MCNC: 6.4 G/DL (ref 6–8.4)
RBC # BLD AUTO: 2.63 M/UL (ref 4–5.4)
SAMPLE: ABNORMAL
SAMPLE: NORMAL
SODIUM SERPL-SCNC: 130 MMOL/L (ref 136–145)
SPECIMEN OUTDATE: NORMAL
WBC # BLD AUTO: 11.53 K/UL (ref 3.9–12.7)

## 2024-11-27 PROCEDURE — 36430 TRANSFUSION BLD/BLD COMPNT: CPT

## 2024-11-27 PROCEDURE — G0378 HOSPITAL OBSERVATION PER HR: HCPCS

## 2024-11-27 PROCEDURE — 99285 EMERGENCY DEPT VISIT HI MDM: CPT | Mod: 25

## 2024-11-27 PROCEDURE — 99291 CRITICAL CARE FIRST HOUR: CPT

## 2024-11-27 PROCEDURE — 36415 COLL VENOUS BLD VENIPUNCTURE: CPT | Performed by: EMERGENCY MEDICINE

## 2024-11-27 PROCEDURE — 93010 ELECTROCARDIOGRAM REPORT: CPT | Mod: ,,, | Performed by: GENERAL PRACTICE

## 2024-11-27 PROCEDURE — 93005 ELECTROCARDIOGRAM TRACING: CPT | Performed by: GENERAL PRACTICE

## 2024-11-27 PROCEDURE — 96361 HYDRATE IV INFUSION ADD-ON: CPT

## 2024-11-27 PROCEDURE — 82565 ASSAY OF CREATININE: CPT

## 2024-11-27 PROCEDURE — 85025 COMPLETE CBC W/AUTO DIFF WBC: CPT | Performed by: EMERGENCY MEDICINE

## 2024-11-27 PROCEDURE — 94761 N-INVAS EAR/PLS OXIMETRY MLT: CPT

## 2024-11-27 PROCEDURE — 25000003 PHARM REV CODE 250: Performed by: EMERGENCY MEDICINE

## 2024-11-27 PROCEDURE — 86920 COMPATIBILITY TEST SPIN: CPT | Performed by: EMERGENCY MEDICINE

## 2024-11-27 PROCEDURE — 86901 BLOOD TYPING SEROLOGIC RH(D): CPT | Performed by: EMERGENCY MEDICINE

## 2024-11-27 PROCEDURE — P9016 RBC LEUKOCYTES REDUCED: HCPCS | Performed by: EMERGENCY MEDICINE

## 2024-11-27 PROCEDURE — 80053 COMPREHEN METABOLIC PANEL: CPT | Performed by: EMERGENCY MEDICINE

## 2024-11-27 PROCEDURE — 87040 BLOOD CULTURE FOR BACTERIA: CPT | Performed by: EMERGENCY MEDICINE

## 2024-11-27 RX ORDER — ALUMINUM HYDROXIDE, MAGNESIUM HYDROXIDE, AND SIMETHICONE 1200; 120; 1200 MG/30ML; MG/30ML; MG/30ML
30 SUSPENSION ORAL 4 TIMES DAILY PRN
Status: DISCONTINUED | OUTPATIENT
Start: 2024-11-28 | End: 2024-11-30 | Stop reason: HOSPADM

## 2024-11-27 RX ORDER — ONDANSETRON HYDROCHLORIDE 2 MG/ML
4 INJECTION, SOLUTION INTRAVENOUS EVERY 6 HOURS PRN
Status: DISCONTINUED | OUTPATIENT
Start: 2024-11-28 | End: 2024-11-30 | Stop reason: HOSPADM

## 2024-11-27 RX ORDER — SODIUM CHLORIDE 0.9 % (FLUSH) 0.9 %
10 SYRINGE (ML) INJECTION EVERY 12 HOURS PRN
Status: DISCONTINUED | OUTPATIENT
Start: 2024-11-28 | End: 2024-11-30 | Stop reason: HOSPADM

## 2024-11-27 RX ORDER — SODIUM CHLORIDE 9 MG/ML
INJECTION, SOLUTION INTRAVENOUS CONTINUOUS
Status: ACTIVE | OUTPATIENT
Start: 2024-11-28 | End: 2024-11-28

## 2024-11-27 RX ORDER — PANTOPRAZOLE SODIUM 40 MG/10ML
40 INJECTION, POWDER, LYOPHILIZED, FOR SOLUTION INTRAVENOUS 2 TIMES DAILY
Status: DISCONTINUED | OUTPATIENT
Start: 2024-11-27 | End: 2024-11-30 | Stop reason: HOSPADM

## 2024-11-27 RX ORDER — DOXYCYCLINE 100 MG/1
100 CAPSULE ORAL EVERY 12 HOURS
Status: DISCONTINUED | OUTPATIENT
Start: 2024-11-28 | End: 2024-11-29

## 2024-11-27 RX ORDER — HYDROCODONE BITARTRATE AND ACETAMINOPHEN 500; 5 MG/1; MG/1
TABLET ORAL
Status: DISCONTINUED | OUTPATIENT
Start: 2024-11-27 | End: 2024-11-30 | Stop reason: HOSPADM

## 2024-11-27 RX ORDER — MEROPENEM 500 MG/1
500 INJECTION, POWDER, FOR SOLUTION INTRAVENOUS
Status: DISCONTINUED | OUTPATIENT
Start: 2024-11-28 | End: 2024-11-28

## 2024-11-27 RX ORDER — NALOXONE HCL 0.4 MG/ML
0.02 VIAL (ML) INJECTION
Status: DISCONTINUED | OUTPATIENT
Start: 2024-11-28 | End: 2024-11-30 | Stop reason: HOSPADM

## 2024-11-27 RX ORDER — TALC
6 POWDER (GRAM) TOPICAL NIGHTLY PRN
Status: DISCONTINUED | OUTPATIENT
Start: 2024-11-28 | End: 2024-11-30 | Stop reason: HOSPADM

## 2024-11-27 RX ORDER — ACETAMINOPHEN 325 MG/1
650 TABLET ORAL EVERY 4 HOURS PRN
Status: DISCONTINUED | OUTPATIENT
Start: 2024-11-28 | End: 2024-11-30 | Stop reason: HOSPADM

## 2024-11-27 RX ORDER — OXYCODONE AND ACETAMINOPHEN 10; 325 MG/1; MG/1
1 TABLET ORAL EVERY 6 HOURS PRN
Status: DISCONTINUED | OUTPATIENT
Start: 2024-11-28 | End: 2024-11-30 | Stop reason: HOSPADM

## 2024-11-27 RX ADMIN — SODIUM CHLORIDE 1000 ML: 9 INJECTION, SOLUTION INTRAVENOUS at 08:11

## 2024-11-28 LAB
ALBUMIN SERPL BCP-MCNC: 3.1 G/DL (ref 3.5–5.2)
ALP SERPL-CCNC: 735 U/L (ref 55–135)
ALT SERPL W/O P-5'-P-CCNC: 10 U/L (ref 10–44)
ANION GAP SERPL CALC-SCNC: 6 MMOL/L (ref 8–16)
AST SERPL-CCNC: 15 U/L (ref 10–40)
BACTERIA #/AREA URNS HPF: NORMAL /HPF
BASOPHILS # BLD AUTO: 0.04 K/UL (ref 0–0.2)
BASOPHILS # BLD AUTO: 0.06 K/UL (ref 0–0.2)
BASOPHILS NFR BLD: 0.5 % (ref 0–1.9)
BASOPHILS NFR BLD: 0.8 % (ref 0–1.9)
BILIRUB SERPL-MCNC: 1.5 MG/DL (ref 0.1–1)
BILIRUB UR QL STRIP: NEGATIVE
BLD PROD TYP BPU: NORMAL
BLOOD UNIT EXPIRATION DATE: NORMAL
BLOOD UNIT TYPE CODE: 7300
BLOOD UNIT TYPE: NORMAL
BUN SERPL-MCNC: 34 MG/DL (ref 8–23)
CALCIUM SERPL-MCNC: 8.2 MG/DL (ref 8.7–10.5)
CHLORIDE SERPL-SCNC: 103 MMOL/L (ref 95–110)
CLARITY UR: CLEAR
CO2 SERPL-SCNC: 24 MMOL/L (ref 23–29)
CODING SYSTEM: NORMAL
COLOR UR: YELLOW
CREAT SERPL-MCNC: 1.1 MG/DL (ref 0.5–1.4)
CROSSMATCH INTERPRETATION: NORMAL
DIFFERENTIAL METHOD BLD: ABNORMAL
DIFFERENTIAL METHOD BLD: ABNORMAL
DISPENSE STATUS: NORMAL
EOSINOPHIL # BLD AUTO: 0.2 K/UL (ref 0–0.5)
EOSINOPHIL # BLD AUTO: 0.3 K/UL (ref 0–0.5)
EOSINOPHIL NFR BLD: 2.3 % (ref 0–8)
EOSINOPHIL NFR BLD: 3.3 % (ref 0–8)
ERYTHROCYTE [DISTWIDTH] IN BLOOD BY AUTOMATED COUNT: 16.5 % (ref 11.5–14.5)
ERYTHROCYTE [DISTWIDTH] IN BLOOD BY AUTOMATED COUNT: 16.7 % (ref 11.5–14.5)
EST. GFR  (NO RACE VARIABLE): 51.8 ML/MIN/1.73 M^2
FERRITIN SERPL-MCNC: 37.2 NG/ML (ref 20–300)
GLUCOSE SERPL-MCNC: 85 MG/DL (ref 70–110)
GLUCOSE UR QL STRIP: NEGATIVE
HCT VFR BLD AUTO: 24.6 % (ref 37–48.5)
HCT VFR BLD AUTO: 25.1 % (ref 37–48.5)
HGB BLD-MCNC: 7.2 G/DL (ref 12–16)
HGB BLD-MCNC: 7.4 G/DL (ref 12–16)
HGB UR QL STRIP: NEGATIVE
HYALINE CASTS #/AREA URNS LPF: 0 /LPF
IMM GRANULOCYTES # BLD AUTO: 0.03 K/UL (ref 0–0.04)
IMM GRANULOCYTES # BLD AUTO: 0.04 K/UL (ref 0–0.04)
IMM GRANULOCYTES NFR BLD AUTO: 0.4 % (ref 0–0.5)
IMM GRANULOCYTES NFR BLD AUTO: 0.5 % (ref 0–0.5)
IRON SERPL-MCNC: 50 UG/DL (ref 30–160)
KETONES UR QL STRIP: NEGATIVE
LEUKOCYTE ESTERASE UR QL STRIP: NEGATIVE
LIPASE SERPL-CCNC: 12 U/L (ref 4–60)
LYMPHOCYTES # BLD AUTO: 0.7 K/UL (ref 1–4.8)
LYMPHOCYTES # BLD AUTO: 1.6 K/UL (ref 1–4.8)
LYMPHOCYTES NFR BLD: 20.3 % (ref 18–48)
LYMPHOCYTES NFR BLD: 8.5 % (ref 18–48)
MAGNESIUM SERPL-MCNC: 2.6 MG/DL (ref 1.6–2.6)
MCH RBC QN AUTO: 23.5 PG (ref 27–31)
MCH RBC QN AUTO: 23.6 PG (ref 27–31)
MCHC RBC AUTO-ENTMCNC: 29.3 G/DL (ref 32–36)
MCHC RBC AUTO-ENTMCNC: 29.5 G/DL (ref 32–36)
MCV RBC AUTO: 80 FL (ref 82–98)
MCV RBC AUTO: 80 FL (ref 82–98)
MICROSCOPIC COMMENT: NORMAL
MONOCYTES # BLD AUTO: 0.5 K/UL (ref 0.3–1)
MONOCYTES # BLD AUTO: 0.6 K/UL (ref 0.3–1)
MONOCYTES NFR BLD: 6.3 % (ref 4–15)
MONOCYTES NFR BLD: 8 % (ref 4–15)
NEUTROPHILS # BLD AUTO: 5.2 K/UL (ref 1.8–7.7)
NEUTROPHILS # BLD AUTO: 6.7 K/UL (ref 1.8–7.7)
NEUTROPHILS NFR BLD: 67.2 % (ref 38–73)
NEUTROPHILS NFR BLD: 81.9 % (ref 38–73)
NITRITE UR QL STRIP: NEGATIVE
NRBC BLD-RTO: 0 /100 WBC
NRBC BLD-RTO: 0 /100 WBC
NUM UNITS TRANS PACKED RBC: NORMAL
OB PNL STL: NEGATIVE
OHS QRS DURATION: 88 MS
OHS QTC CALCULATION: 443 MS
OSMOLALITY SERPL: 295 MOSM/KG (ref 275–295)
OSMOLALITY UR: 382 MOSM/KG (ref 50–1200)
PH UR STRIP: 6 [PH] (ref 5–8)
PLATELET # BLD AUTO: 430 K/UL (ref 150–450)
PLATELET # BLD AUTO: 442 K/UL (ref 150–450)
PMV BLD AUTO: 9.5 FL (ref 9.2–12.9)
PMV BLD AUTO: 9.5 FL (ref 9.2–12.9)
POTASSIUM SERPL-SCNC: 4 MMOL/L (ref 3.5–5.1)
PROCALCITONIN SERPL IA-MCNC: 3.69 NG/ML (ref 0–0.5)
PROT SERPL-MCNC: 5.8 G/DL (ref 6–8.4)
PROT UR QL STRIP: ABNORMAL
RBC # BLD AUTO: 3.07 M/UL (ref 4–5.4)
RBC # BLD AUTO: 3.14 M/UL (ref 4–5.4)
RBC #/AREA URNS HPF: 1 /HPF (ref 0–4)
RETICS/RBC NFR AUTO: 2.2 % (ref 0.5–2.5)
SATURATED IRON: 19 % (ref 20–50)
SODIUM SERPL-SCNC: 133 MMOL/L (ref 136–145)
SODIUM UR-SCNC: 26 MMOL/L (ref 20–250)
SP GR UR STRIP: 1.03 (ref 1–1.03)
SQUAMOUS #/AREA URNS HPF: 0 /HPF
T4 FREE SERPL-MCNC: 1 NG/DL (ref 0.71–1.51)
TOTAL IRON BINDING CAPACITY: 263 UG/DL (ref 250–450)
TRANSFERRIN SERPL-MCNC: 188 MG/DL (ref 200–375)
TROPONIN I SERPL HS-MCNC: 11.7 PG/ML (ref 0–14.9)
TROPONIN I SERPL HS-MCNC: 5.1 PG/ML (ref 0–14.9)
TROPONIN I SERPL HS-MCNC: 5.9 PG/ML (ref 0–14.9)
TSH SERPL DL<=0.005 MIU/L-ACNC: 1.59 UIU/ML (ref 0.34–5.6)
URN SPEC COLLECT METH UR: ABNORMAL
UROBILINOGEN UR STRIP-ACNC: NEGATIVE EU/DL
WBC # BLD AUTO: 7.78 K/UL (ref 3.9–12.7)
WBC # BLD AUTO: 8.15 K/UL (ref 3.9–12.7)
WBC #/AREA URNS HPF: 1 /HPF (ref 0–5)

## 2024-11-28 PROCEDURE — 36415 COLL VENOUS BLD VENIPUNCTURE: CPT

## 2024-11-28 PROCEDURE — 84439 ASSAY OF FREE THYROXINE: CPT

## 2024-11-28 PROCEDURE — 85025 COMPLETE CBC W/AUTO DIFF WBC: CPT

## 2024-11-28 PROCEDURE — 85025 COMPLETE CBC W/AUTO DIFF WBC: CPT | Mod: 91

## 2024-11-28 PROCEDURE — 84443 ASSAY THYROID STIM HORMONE: CPT

## 2024-11-28 PROCEDURE — 86920 COMPATIBILITY TEST SPIN: CPT

## 2024-11-28 PROCEDURE — P9016 RBC LEUKOCYTES REDUCED: HCPCS

## 2024-11-28 PROCEDURE — 84300 ASSAY OF URINE SODIUM: CPT

## 2024-11-28 PROCEDURE — 83935 ASSAY OF URINE OSMOLALITY: CPT

## 2024-11-28 PROCEDURE — 97165 OT EVAL LOW COMPLEX 30 MIN: CPT

## 2024-11-28 PROCEDURE — 83930 ASSAY OF BLOOD OSMOLALITY: CPT

## 2024-11-28 PROCEDURE — 94761 N-INVAS EAR/PLS OXIMETRY MLT: CPT

## 2024-11-28 PROCEDURE — 84145 PROCALCITONIN (PCT): CPT

## 2024-11-28 PROCEDURE — 96375 TX/PRO/DX INJ NEW DRUG ADDON: CPT

## 2024-11-28 PROCEDURE — 12000002 HC ACUTE/MED SURGE SEMI-PRIVATE ROOM

## 2024-11-28 PROCEDURE — 80053 COMPREHEN METABOLIC PANEL: CPT

## 2024-11-28 PROCEDURE — 84466 ASSAY OF TRANSFERRIN: CPT

## 2024-11-28 PROCEDURE — 94760 N-INVAS EAR/PLS OXIMETRY 1: CPT

## 2024-11-28 PROCEDURE — 82728 ASSAY OF FERRITIN: CPT

## 2024-11-28 PROCEDURE — 83735 ASSAY OF MAGNESIUM: CPT

## 2024-11-28 PROCEDURE — 84484 ASSAY OF TROPONIN QUANT: CPT

## 2024-11-28 PROCEDURE — 25000003 PHARM REV CODE 250: Performed by: INTERNAL MEDICINE

## 2024-11-28 PROCEDURE — 82272 OCCULT BLD FECES 1-3 TESTS: CPT

## 2024-11-28 PROCEDURE — 63600175 PHARM REV CODE 636 W HCPCS

## 2024-11-28 PROCEDURE — 25000003 PHARM REV CODE 250

## 2024-11-28 PROCEDURE — 83690 ASSAY OF LIPASE: CPT

## 2024-11-28 PROCEDURE — 96374 THER/PROPH/DIAG INJ IV PUSH: CPT

## 2024-11-28 PROCEDURE — 51798 US URINE CAPACITY MEASURE: CPT

## 2024-11-28 PROCEDURE — 85045 AUTOMATED RETICULOCYTE COUNT: CPT

## 2024-11-28 PROCEDURE — 81001 URINALYSIS AUTO W/SCOPE: CPT | Performed by: EMERGENCY MEDICINE

## 2024-11-28 PROCEDURE — 96376 TX/PRO/DX INJ SAME DRUG ADON: CPT

## 2024-11-28 PROCEDURE — 30233N1 TRANSFUSION OF NONAUTOLOGOUS RED BLOOD CELLS INTO PERIPHERAL VEIN, PERCUTANEOUS APPROACH: ICD-10-PCS | Performed by: FAMILY MEDICINE

## 2024-11-28 RX ORDER — MEROPENEM 1 G/1
1 INJECTION, POWDER, FOR SOLUTION INTRAVENOUS
Status: DISCONTINUED | OUTPATIENT
Start: 2024-11-29 | End: 2024-11-30

## 2024-11-28 RX ORDER — POLYETHYLENE GLYCOL 3350 17 G/17G
340 POWDER, FOR SOLUTION ORAL ONCE
Status: COMPLETED | OUTPATIENT
Start: 2024-11-28 | End: 2024-11-28

## 2024-11-28 RX ORDER — HYDROCODONE BITARTRATE AND ACETAMINOPHEN 500; 5 MG/1; MG/1
TABLET ORAL
Status: DISCONTINUED | OUTPATIENT
Start: 2024-11-28 | End: 2024-11-30 | Stop reason: HOSPADM

## 2024-11-28 RX ADMIN — PANTOPRAZOLE SODIUM 40 MG: 40 INJECTION, POWDER, FOR SOLUTION INTRAVENOUS at 12:11

## 2024-11-28 RX ADMIN — PANTOPRAZOLE SODIUM 40 MG: 40 INJECTION, POWDER, FOR SOLUTION INTRAVENOUS at 09:11

## 2024-11-28 RX ADMIN — OXYCODONE HYDROCHLORIDE AND ACETAMINOPHEN 1 TABLET: 10; 325 TABLET ORAL at 03:11

## 2024-11-28 RX ADMIN — MEROPENEM 500 MG: 500 INJECTION INTRAVENOUS at 02:11

## 2024-11-28 RX ADMIN — MEROPENEM 500 MG: 500 INJECTION INTRAVENOUS at 01:11

## 2024-11-28 RX ADMIN — PREGABALIN 100 MG: 75 CAPSULE ORAL at 08:11

## 2024-11-28 RX ADMIN — PREGABALIN 100 MG: 75 CAPSULE ORAL at 11:11

## 2024-11-28 RX ADMIN — PANTOPRAZOLE SODIUM 40 MG: 40 INJECTION, POWDER, FOR SOLUTION INTRAVENOUS at 08:11

## 2024-11-28 RX ADMIN — DOXYCYCLINE HYCLATE 100 MG: 100 CAPSULE ORAL at 08:11

## 2024-11-28 RX ADMIN — OXYCODONE HYDROCHLORIDE AND ACETAMINOPHEN 1 TABLET: 10; 325 TABLET ORAL at 01:11

## 2024-11-28 RX ADMIN — DOXYCYCLINE HYCLATE 100 MG: 100 CAPSULE ORAL at 11:11

## 2024-11-28 RX ADMIN — SODIUM CHLORIDE: 9 INJECTION, SOLUTION INTRAVENOUS at 01:11

## 2024-11-28 RX ADMIN — POLYETHYLENE GLYCOL 3350 340 G: 17 POWDER, FOR SOLUTION ORAL at 05:11

## 2024-11-28 NOTE — H&P
[facility] - Emergency Dept  Hospital Medicine  History & Physical    Patient Name: Froilan Ray  MRN: [id number]  Patient Class: OP- Observation  Admission Date: 11/27/2024  Attending Physician: Traci Huynh MD   Primary Care Provider: Alphonse Boothe III, MD         Patient information was obtained from patient, relative(s), past medical records, and ER records.     Subjective:     Principal Problem:Symptomatic anemia    Chief Complaint:   Chief Complaint   Patient presents with    Abdominal Pain     Pt discharged after being dx with colitis.  Pt compliant with abx, but c/o increased abd pain.  Pt says she hasn't had a BM in three days        HPI: 72-year-old female presented to ED for eval of abdominal pain.  Patient reported she continues to have lower abdominal pain abdominal pain s/p cholecystitis and refractory colitis.  Reported associated constipation and hypotension, last BM 3 days ago, patient is on opiates regularly for chronic pain.  Patient is on antibiotics outpatient, seen in ED 2 days ago for same abdominal pain and dysuria, UA without infection and CT abdomen with persistent colitis, placed on Augmentin and discharged from ED. Patient reports compliance with Augmentin. Patient is also on chronic prophylactic doxycycline for now 2/2 history MRSA bacteremia s/p pain injection and subsequent necrotic hip replacement.  Patient was admitted to this facility at the end of September with similar symptoms, evaluated by GI, MRCP 9/26 with dilated common hepatic and common bile ducts, with truncated appearance of the distal common bile duct at the level of the ampulla and a possible duodenal mass, patient taken for EGD and EUS 9/27 showed erosive gastritis and multiple shallow ulcerations in duodenal bulb without any mass seen, GI recs follow up outpatient for colonoscopy, and consider ERCP only if pain w/ LFTs elevated as biliary dilation has been present since EUS on '20. Since  discharge, patient has not been well enough to follow-up with GI for recommended colonoscopy.  Patient does also have history of bleeding ulcer.    In ED today, patient initially hypotensive, improved with IVF.  Patient noted with symptomatic anemia, hemoglobin/hematocrit 6.4/21.6, baseline appears to be between 8 and 9.  PRBCs ordered in ED, transfusing.  Has PEDRO, creatinine 1.9, baseline appears to be 0.6-0.7.  Also with hyponatremia, Na 130.  CT abdomen pelvis impression with circumferential wall thickening along the colon more prominent involving the right colon may relate to colitis, with progression of wall thickening along the right colon compared to the prior examination; moderate prominence of the right colon with mild-to-moderate prominence of the transverse colon and relative transition of diminished caliber at the junction of the splenic flexure and descending colon, this may be transient however if the patient has not had recent colon screening exam I would recommend follow-up colonoscopy or fluoroscopic barium exam; moderate to large hiatal hernia.  Admit to hospital medicine for further eval.    Past Medical History:   Diagnosis Date    Anemia     Arthritis     Bleeding ulcer 07/2016    Chronic pain     DDD (degenerative disc disease), cervical     DDD (degenerative disc disease), lumbar     Depression     Disc degeneration, lumbosacral     Diverticulitis     Encounter for blood transfusion     Hypertension     IBS (irritable bowel syndrome)     Neuropathy of both feet     Seizures     none  since 2017    Umbilical hernia 2020       Past Surgical History:   Procedure Laterality Date    ARTHROPLASTY, HIP, GIRDLESTONE, POSTERIOR APPROACH Left 1/19/2024    Procedure: ARTHROPLASTY, HIP, GIRDLESTONE, POSTERIOR APPROACH;  Surgeon: Bulmaro Tellez MD;  Location: [facility] OR [address] [place] [address];  Service: Orthopedics;  Laterality: Left;    ARTHROPLASTY, HIP, GIRDLESTONE, POSTERIOR APPROACH Left 1/25/2024    Procedure:  Irrigation and debridement left hip,;  Surgeon: Juanito Painting MD;  Location: [facility] OR 2ND FLR;  Service: Orthopedics;  Laterality: Left;    ARTHROPLASTY, HIP, GIRDLESTONE, POSTERIOR APPROACH Left 2/15/2024    Procedure: Revision hip antibiotic spacer head exchange, left, lateral, peg board, depuy osteomed in room, vanc, ancef, txa,;  Surgeon: Bulmaro Tellez MD;  Location: [facility] OR 2ND FLR;  Service: Orthopedics;  Laterality: Left;    BACK SURGERY      BREAST BIOPSY      BREAST SURGERY Right     lumpectomy    CAUDAL EPIDURAL STEROID INJECTION N/A 01/28/2022    Procedure: Injection-steroid-epidural-caudal;  Surgeon: Luzmaria Marcelino MD;  [facility];  Service: Pain Management;  Laterality: N/A;    COLONOSCOPY N/A 01/14/2022    Procedure: COLONOSCOPY;  Surgeon: Abby Landers MD;  Location: [facility];  Service: Endoscopy;  Laterality: N/A;    ENDOSCOPIC ULTRASOUND OF UPPER GASTROINTESTINAL TRACT N/A 07/21/2020    Procedure: ULTRASOUND, UPPER GI TRACT, ENDOSCOPIC;  Surgeon: Marcio Nguyễn III, MD;  Location: [facility];  Service: Endoscopy;  Laterality: N/A;    ENDOSCOPIC ULTRASOUND OF UPPER GASTROINTESTINAL TRACT N/A 9/27/2024    Procedure: ULTRASOUND, UPPER GI TRACT, ENDOSCOPIC;  Surgeon: Marcio Nguyễn III, MD;  Location: [facility];  Service: Endoscopy;  Laterality: N/A;    ESOPHAGOGASTRODUODENOSCOPY N/A 01/14/2022    Procedure: EGD (ESOPHAGOGASTRODUODENOSCOPY);  Surgeon: Abby Landers MD;  Location: [facility];  Service: Endoscopy;  Laterality: N/A;    ESOPHAGOGASTRODUODENOSCOPY N/A 06/10/2022    Procedure: EGD (ESOPHAGOGASTRODUODENOSCOPY);  Surgeon: Abby Landers MD;  Location: [facility] ENDO;  Service: Endoscopy;  Laterality: N/A;    EYE SURGERY      cataract    FLAP GRAFT, LOCAL Left 2/15/2024    Procedure: FLAP GRAFT, LOCAL;  Surgeon: Bulmaro Tellez MD;  Location: [facility] OR 2ND FLR;  Service: Orthopedics;  Laterality: Left;    HYSTERECTOMY      INTRAMEDULLARY RODDING OF  TROCHANTER OF FEMUR Left 12/11/2018    Procedure: INSERTION, INTRAMEDULLARY SANTOS, FEMUR, TROCHANTER;  Surgeon: Eulalio De La Cruz MD;  Location: [facility] OR;  Service: Orthopedics;  Laterality: Left;    IRRIGATION AND DEBRIDEMENT OF LOWER EXTREMITY Left 1/19/2024    Procedure: IRRIGATION AND DEBRIDEMENT, LOWER EXTREMITY;  Surgeon: Bulmaro Tellez MD;  Location: [facility] OR 2ND FLR;  Service: Orthopedics;  Laterality: Left;    IRRIGATION AND DEBRIDEMENT OF LOWER EXTREMITY Left 2/15/2024    Procedure: IRRIGATION AND DEBRIDEMENT, LOWER EXTREMITY;  Surgeon: Bulmaro Tellez MD;  Location: [facility] OR 2ND FLR;  Service: Orthopedics;  Laterality: Left;    LAPAROSCOPIC CHOLECYSTECTOMY N/A 9/8/2024    Procedure: CHOLECYSTECTOMY, LAPAROSCOPIC;  Surgeon: Cipriano Ambrosio MD;  Location: [facility];  Service: General;  Laterality: N/A;    REPAIR OF EPIGASTRIC HERNIA N/A 12/7/2023    Procedure: REPAIR, HERNIA, EPIGASTRIC;  Surgeon: Vipul Escobar MD;  Location: [facility] OR;  Service: General;  Laterality: N/A;    SPINAL CORD STIMULATOR IMPLANT  09/18/2013    and removal    SPINE SURGERY  2006    lumbar L2-S1 decompression.    SPINE SURGERY      cervical decompression    TONSILLECTOMY         Review of patient's allergies indicates:  No Known Allergies    No current facility-administered medications on file prior to encounter.     Current Outpatient Medications on File Prior to Encounter   Medication Sig    acetaminophen (TYLENOL) 325 MG tablet Take 2 tablets (650 mg total) by mouth every 8 (eight) hours as needed for Pain (pain scale 1-4).    amlodipine-benazepril 5-20 mg (LOTREL) 5-20 mg per capsule Take 1 capsule by mouth once daily.    amoxicillin-clavulanate 875-125mg (AUGMENTIN) 875-125 mg per tablet Take 1 tablet by mouth 2 (two) times daily. for 7 days    calcium carbonate (TUMS) 200 mg calcium (500 mg) chewable tablet Chew and swallow 2 tablets (1,000 mg total) by mouth once daily. (Patient taking differently: Take 1,000 mg by  mouth daily as needed for Heartburn.)    cyclobenzaprine (FLEXERIL) 10 MG tablet Take 1 tablet (10 mg total) by mouth 3 (three) times daily as needed for Muscle spasms.    docusate sodium (COLACE) 100 MG capsule Take 1 capsule (100 mg total) by mouth 2 (two) times daily.    doxycycline (VIBRAMYCIN) 100 MG Cap Take 100 mg by mouth every 12 (twelve) hours.    oxyCODONE-acetaminophen (PERCOCET)  mg per tablet Take 1 tablet by mouth every 4 (four) hours as needed for Pain.    pregabalin (LYRICA) 200 MG Cap Take 1 capsule (200 mg total) by mouth 3 (three) times daily.    levETIRAcetam (KEPPRA) 500 MG Tab Take 1 tablet (500 mg total) by mouth 2 (two) times daily. (Patient not taking: Reported on 11/27/2024)    [DISCONTINUED] pantoprazole (PROTONIX) 40 MG tablet Take 1 tablet (40 mg total) by mouth 2 (two) times daily.     Family History       Problem Relation (Age of Onset)    COPD Brother    Coronary artery disease Father    Depression Father    Diabetes Mother, Father, Sister, Brother    Heart disease Father    Hypertension Mother, Father    Irritable bowel syndrome Mother          Tobacco Use    Smoking status: Never    Smokeless tobacco: Never   Substance and Sexual Activity    Alcohol use: No    Drug use: No    Sexual activity: Not Currently     Review of Systems   Constitutional:  Negative for chills and fever.   Respiratory:  Negative for shortness of breath.    Cardiovascular:  Positive for leg swelling. Negative for chest pain.   Gastrointestinal:  Positive for abdominal pain and constipation. Negative for diarrhea and vomiting.   Genitourinary:  Positive for dysuria.   Musculoskeletal:  Positive for back pain.   Skin:  Positive for pallor. Negative for wound.   Neurological:  Negative for syncope.     Objective:     Vital Signs (Most Recent):  Temp: 98 °F (36.7 °C) (11/27/24 2230)  Pulse: 85 (11/27/24 2230)  Resp: 20 (11/27/24 2230)  BP: (!) 112/53 (11/27/24 2230)  SpO2: (!) 94 % (11/27/24 2230) Vital  "Signs (24h Range):  Temp:  [97.3 °F (36.3 °C)-99 °F (37.2 °C)] 98 °F (36.7 °C)  Pulse:  [81-93] 85  Resp:  [14-20] 20  SpO2:  [94 %-100 %] 94 %  BP: ()/(36-90) 112/53     Weight: 59 kg (130 lb)  Body mass index is 20.98 kg/m².     Physical Exam  Vitals reviewed.   Constitutional:       Appearance: She is ill-appearing.   HENT:      Head: Normocephalic and atraumatic.      Mouth/Throat:      Mouth: Mucous membranes are dry.   Eyes:      Conjunctiva/sclera: Conjunctivae normal.      Comments: R exotropia    Cardiovascular:      Rate and Rhythm: Normal rate and regular rhythm.   Pulmonary:      Effort: Pulmonary effort is normal.      Breath sounds: Decreased breath sounds present.   Abdominal:      Palpations: Abdomen is soft.      Tenderness: There is abdominal tenderness.      Hernia: A hernia is present.      Comments: Hypoactive bowel sounds   Musculoskeletal:         General: Normal range of motion.      Cervical back: Rigidity present.      Right lower leg: No edema.      Left lower leg: Edema (chronic) present.   Skin:     General: Skin is warm and dry.      Coloration: Skin is pale.   Neurological:      Mental Status: She is alert. Mental status is at baseline.      Cranial Nerves: Cranial nerve deficit ([place]) present.   Psychiatric:         Mood and Affect: Mood normal.                Significant Labs: All pertinent labs within the past 24 hours have been reviewed.  Bilirubin:   Recent Labs   Lab 11/25/24  1336 11/27/24 2026   BILITOT 1.0 1.1*     Blood Culture: No results for input(s): "LABBLOO" in the last 48 hours.  CBC:   Recent Labs   Lab 11/27/24 2026   WBC 11.53   HGB 6.4*   HCT 21.6*   *     CMP:   Recent Labs   Lab 11/27/24 2026   *   K 4.5   CL 96   CO2 26   GLU 97   BUN 40*   CREATININE 1.9*   CALCIUM 9.0   PROT 6.4   ALBUMIN 3.5   BILITOT 1.1*   ALKPHOS 694*   AST 16   ALT 11   ANIONGAP 8     Cardiac Markers: No results for input(s): "CKMB", "MYOGLOBIN", "BNP", "TROPISTAT" in " "the last 48 hours.  Lactic Acid: No results for input(s): "LACTATE" in the last 48 hours.  Lipase: No results for input(s): "LIPASE" in the last 48 hours.  Magnesium: No results for input(s): "MG" in the last 48 hours.  Troponin: No results for input(s): "TROPONINI", "TROPONINIHS" in the last 48 hours.  TSH:   Recent Labs   Lab 08/06/24  0920   TSH 0.774     Urine Studies: No results for input(s): "COLORU", "APPEARANCEUA", "PHUR", "SPECGRAV", "PROTEINUA", "GLUCUA", "KETONESU", "BILIRUBINUA", "OCCULTUA", "NITRITE", "UROBILINOGEN", "LEUKOCYTESUR", "RBCUA", "WBCUA", "BACTERIA", "SQUAMEPITHEL", "HYALINECASTS" in the last 48 hours.    Invalid input(s): "WRIGHTSUR"    Significant Imaging: I have reviewed all pertinent imaging results/findings within the past 24 hours.  Assessment/Plan:     * Symptomatic anemia  Anemia is likely due to  may be multifactorial, work-up in progress. Chronic disease, hx bleeding ulcer  . Most recent hemoglobin and hematocrit are listed below.  Recent Labs     11/25/24  1336 11/27/24 2026   HGB 7.8* 6.4*   HCT 26.5* 21.6*     Plan  - Monitor serial CBC: Daily  - Transfuse PRBC if patient becomes hemodynamically unstable, symptomatic or H/H drops below 7/21.  - Patient has received 1 units of PRBCs on 11/27/24  - Patient's anemia is currently  below transfusion threshold, transfusion in progress, repeat h&h s/p transfusion  - Occult stool  -Retic count, iron studies  -Protonix BID, monitor PEDRO  -Gentle IVF overnight x 500ml given patient's initial hypotension, EF 50-55% 1/2024  -Hold home BP med, reduce pain med dosing given hypotension  -Avoid thinners  -Consult GI    Colitis  Refractory, recurrent  Has not been well enough to follow up for recommended outpatient colonoscopy  Blood cx's, procalcitonin, daily CBC  Stool studies  Hold home augmentin  Start merrem  I&O  Consult GI    Disease of biliary tract  Per dc instructions September 2023, per GI recs consider ERCP only if pain w/LFTs " elevated as biliary dilation has been present since EUS on '20   Lipase      PEDRO (acute kidney injury)  PEDRO is likely due to pre-renal azotemia due to intravascular volume depletion. Baseline creatinine is  0.6-0.7 . Most recent creatinine and eGFR are listed below.  Recent Labs     11/25/24 1336 11/27/24 2026   CREATININE 0.9 1.9*   EGFRNORACEVR >60.0 26.9*      Plan  - PEDRO is stable  - Avoid nephrotoxins and renally dose meds for GFR listed above  - Monitor urine output, serial CMP, and adjust therapy as needed  - Gentle IVF overnight x 500ml  -Hold home ACEI  -Infusing 1 unit PRBCs    Hyponatremia  Hyponatremia is likely due to Dehydration/hypovolemia. The patient's most recent sodium results are listed below.  Recent Labs     11/25/24 1336 11/27/24 2026    130*     Plan  - Correct the sodium by 4-6mEq in 24 hours.   - Obtain the following studies: Urine sodium, urine osmolality, serum osmolality or TSH, T4.  - Will treat the hyponatremia with IV fluids as follows: Gentle IVF overnight x 500ml and Fluid restriction of:  1.5 liter per day  - Monitor sodium Daily.   - Patient hyponatremia is stable      History of bacteremia  Hx MRSA bacteremia  Blood cx's, procalcitonin, daily CBC  UA  On long term prophylactic doxycycline, continue        VTE Risk Mitigation (From admission, onward)           Ordered     Reason for No Pharmacological VTE Prophylaxis  Once        Question:  Reasons:  Answer:  Active Bleeding    11/27/24 2334     IP VTE HIGH RISK PATIENT  Once         11/27/24 2334     Place sequential compression device  Until discontinued         11/27/24 2334                         On 11/28/2024, patient should be placed in hospital observation services under my care in collaboration with Dr. Huynh.           Mickie Jarrett NP  Department of Hospital Medicine  [facility] - Emergency Dept

## 2024-11-28 NOTE — HPI
72-year-old female presented to ED for eval of abdominal pain.  Patient reported she continues to have lower abdominal pain abdominal pain s/p cholecystitis and refractory colitis.  Reported associated constipation and hypotension, last BM 3 days ago, patient is on opiates regularly for chronic pain.  Patient is on antibiotics outpatient, seen in ED 2 days ago for same abdominal pain and dysuria, UA without infection and CT abdomen with persistent colitis, placed on Augmentin and discharged from ED. Patient reports compliance with Augmentin. Patient is also on chronic prophylactic doxycycline for now 2/2 history MRSA bacteremia s/p pain injection and subsequent necrotic hip replacement.  Patient was admitted to this facility at the end of September with similar symptoms, evaluated by GI, MRCP 9/26 with dilated common hepatic and common bile ducts, with truncated appearance of the distal common bile duct at the level of the ampulla and a possible duodenal mass, patient taken for EGD and EUS 9/27 showed erosive gastritis and multiple shallow ulcerations in duodenal bulb without any mass seen, GI recs follow up outpatient for colonoscopy, and consider ERCP only if pain w/ LFTs elevated as biliary dilation has been present since EUS on '20. Since discharge, patient has not been well enough to follow-up with GI for recommended colonoscopy.  Patient does also have history of bleeding ulcer.    In ED today, patient initially hypotensive, improved with IVF.  Patient noted with symptomatic anemia, hemoglobin/hematocrit 6.4/21.6, baseline appears to be between 8 and 9.  PRBCs ordered in ED, transfusing.  Has PEDRO, creatinine 1.9, baseline appears to be 0.6-0.7.  Also with hyponatremia, Na 130.  CT abdomen pelvis impression with circumferential wall thickening along the colon more prominent involving the right colon may relate to colitis, with progression of wall thickening along the right colon compared to the prior  examination; moderate prominence of the right colon with mild-to-moderate prominence of the transverse colon and relative transition of diminished caliber at the junction of the splenic flexure and descending colon, this may be transient however if the patient has not had recent colon screening exam I would recommend follow-up colonoscopy or fluoroscopic barium exam; moderate to large hiatal hernia.  Admit to hospital medicine for further eval.

## 2024-11-28 NOTE — ASSESSMENT & PLAN NOTE
PEDRO is likely due to pre-renal azotemia due to intravascular volume depletion. Baseline creatinine is  0.6-0.7 . Most recent creatinine and eGFR are listed below.  Recent Labs     11/25/24  1336 11/27/24 2026   CREATININE 0.9 1.9*   EGFRNORACEVR >60.0 26.9*      Plan  - PEDRO is stable  - Avoid nephrotoxins and renally dose meds for GFR listed above  - Monitor urine output, serial CMP, and adjust therapy as needed  - Gentle IVF overnight x 500ml  -Hold home ACEI  -Infusing 1 unit PRBCs

## 2024-11-28 NOTE — CARE UPDATE
11/28/24 0728   Patient Assessment/Suction   Level of Consciousness (AVPU) alert   Respiratory Effort Normal;Unlabored   Expansion/Accessory Muscles/Retractions no use of accessory muscles;no retractions;expansion symmetric   All Lung Fields Breath Sounds clear   Rhythm/Pattern, Respiratory unlabored   Cough Frequency no cough   PRE-TX-O2   Device (Oxygen Therapy) room air   SpO2 95 %   Pulse Oximetry Type Intermittent   $ Pulse Oximetry - Single Charge Pulse Oximetry - Single   Pulse 79   Resp 17

## 2024-11-28 NOTE — ASSESSMENT & PLAN NOTE
Anemia is likely due to  may be multifactorial, work-up in progress. Chronic disease, hx bleeding ulcer  . Most recent hemoglobin and hematocrit are listed below.  Recent Labs     11/25/24  1336 11/27/24 2026   HGB 7.8* 6.4*   HCT 26.5* 21.6*     Plan  - Monitor serial CBC: Daily  - Transfuse PRBC if patient becomes hemodynamically unstable, symptomatic or H/H drops below 7/21.  - Patient has received 1 units of PRBCs on 11/27/24  - Patient's anemia is currently  below transfusion threshold, transfusion in progress, repeat h&h s/p transfusion  - Occult stool  -Retic count, iron studies  -Protonix BID, monitor PEDRO  -Gentle IVF overnight x 500ml given patient's initial hypotension, EF 50-55% 1/2024  -Hold home BP med, reduce pain med dosing given hypotension  -Avoid thinners  -Consult GI

## 2024-11-28 NOTE — PLAN OF CARE
Pt was explained MARC. Pt verbalized understanding of MARC and signed. MARC scanned to .    11/28/24 0958   MARC Message   Medicare Outpatient and Observation Notification regarding financial responsibility Given to patient/caregiver;Explained to patient/caregiver;Signed/date by patient/caregiver   Date MARC was signed 11/28/24   Time MARC was signed 0935

## 2024-11-28 NOTE — PLAN OF CARE
[facility]  Initial Discharge Assessment       Primary Care Provider: Alphonse Boothe III, MD    Admission Diagnosis: Symptomatic anemia [D64.9]    Admission Date: 11/27/2024  Expected Discharge Date:      met with Pt at bedside to complete discharge assessment. Pt AAOx4s. Demographics, PCP, and insurance verified. No home health. No dialysis. Pt uses walker for ambulating and shower chair for bathing. Pt reports ability to complete all other ADLs without assistance. Pt verbalized plan to discharge home via family transport. Pt has no other needs to be addressed at this time.     Transition of Care Barriers: (P) None    Payor: [employer] MANAGED MEDICARE / Plan: [employer] MEDICARE PLAN PPO / Product Type: Medicare Advantage /     Extended Emergency Contact Information  Primary Emergency Contact: Otoniel Ray  Address:  [address] [address] [facility]  Home Phone: [phone]  Mobile Phone: [phone]  Relation: Spouse  Secondary Emergency Contact: SAHRA MCGUIRE  Mobile Phone: [phone]  Relation: Son    Discharge Plan A: (P) Home with family  Discharge Plan B: (P) Home      [id number] - [place] - [address]  [address]  [place] [address]  Phone: [phone] Fax: [phone]      Initial Assessment (most recent)       Adult Discharge Assessment - 11/28/24 0949          Discharge Assessment    Assessment Type Discharge Planning Assessment (P)      Confirmed/corrected address, phone number and insurance Yes (P)      Confirmed Demographics Correct on Facesheet (P)      Source of Information patient (P)      When was your last doctors appointment? -- (P)    Pt reports last seen a  month ago    Does patient/caregiver understand observation status Yes (P)      Communicated BAILEY with patient/caregiver Date not available/Unable to determine (P)      Reason For Admission Symptomatic anemia (P)      People in Home spouse (P)       Facility Arrived From: Home (P)      Do you expect to return to your current living situation? Yes (P)      Do you have help at home or someone to help you manage your care at home? Yes (P)      Who are your caregiver(s) and their phone number(s)? : Otoniel Ray [phone] (P)      Prior to hospitilization cognitive status: Alert/Oriented (P)      Current cognitive status: Alert/Oriented (P)      Walking or Climbing Stairs Difficulty yes (P)      Walking or Climbing Stairs ambulation difficulty, requires equipment (P)      Dressing/Bathing Difficulty yes (P)      Dressing/Bathing bathing difficulty, requires equipment (P)      Home Accessibility wheelchair accessible (P)      Home Layout Able to live on 1st floor (P)      Equipment Currently Used at Home shower chair;walker, standard;bedside commode (P)      Readmission within 30 days? Yes (P)      Patient currently being followed by outpatient case management? No (P)      Do you currently have service(s) that help you manage your care at home? No (P)      Do you take prescription medications? Yes (P)      Do you have prescription coverage? Yes (P)      Coverage Payor: [employer] MANAGED MEDICARE - [employer] MEDICARE PLAN PPO - (P)      Do you have any problems affording any of your prescribed medications? No (P)      Is the patient taking medications as prescribed? yes (P)      How do you get to doctors appointments? family or friend will provide (P)      Are you on dialysis? No (P)      Do you take coumadin? No (P)      Discharge Plan A Home with family (P)      Discharge Plan B Home (P)      DME Needed Upon Discharge  none (P)      Discharge Plan discussed with: Patient (P)      Transition of Care Barriers None (P)

## 2024-11-28 NOTE — PLAN OF CARE
Problem: Occupational Therapy  Goal: Occupational Therapy Goal  Description: Goals to be met by: 12/12/2024     Patient will increase functional independence with ADLs by performing:    UE Dressing with [place].  LE Dressing with Supervision.  Grooming while seated with Modified [place].  Toileting from toilet with Modified [place] for hygiene and clothing management.   Toilet transfer to toilet with Modified [place].     OT evaluation completed. POC established.

## 2024-11-28 NOTE — ASSESSMENT & PLAN NOTE
Hyponatremia is likely due to Dehydration/hypovolemia. The patient's most recent sodium results are listed below.  Recent Labs     11/25/24  1336 11/27/24 2026    130*     Plan  - Correct the sodium by 4-6mEq in 24 hours.   - Obtain the following studies: Urine sodium, urine osmolality, serum osmolality or TSH, T4.  - Will treat the hyponatremia with IV fluids as follows: Gentle IVF overnight x 500ml and Fluid restriction of:  1.5 liter per day  - Monitor sodium Daily.   - Patient hyponatremia is stable

## 2024-11-28 NOTE — ED PROVIDER NOTES
Chief complaint:  Abdominal Pain (Pt discharged after being dx with colitis.  Pt compliant with abx, but c/o increased abd pain.  Pt says she hasn't had a BM in three days)      HPI:  Froilan Ray is a 79 y.o. female with hx htn, IBS, left inguinal hernia, prior cholex, seen here with LLQ AP dx with colitis, less prominent than previous CT 2 months prior presenting with persistent abdominal pain with decreased p.o. intake per EMS discussion with nurses.  Patient coming from home.  She is a poor historian with poor short-term recall.  She has she denies present abdominal pain on my initial interview with hypotension noted on initial measurement during interview.  She denies emesis.  She claims compliance with antibiotics.  She endorses dark stool two weeks ago without recent blood in the stools or dark stools or diarrhea.  No known fever.    ROS: As per HPI and below:  No chest pain, dyspnea, swelling, fever, hematemesis, syncope, dysuria.    Review of patient's allergies indicates:  No Known Allergies    Patient's Medications   New Prescriptions    No medications on file   Previous Medications    ACETAMINOPHEN (TYLENOL) 325 MG TABLET    Take 2 tablets (650 mg total) by mouth every 8 (eight) hours as needed for Pain (pain scale 1-4).    AMLODIPINE-BENAZEPRIL 5-20 MG (LOTREL) 5-20 MG PER CAPSULE    Take 1 capsule by mouth once daily.    AMOXICILLIN-CLAVULANATE 875-125MG (AUGMENTIN) 875-125 MG PER TABLET    Take 1 tablet by mouth 2 (two) times daily. for 7 days    CALCIUM CARBONATE (TUMS) 200 MG CALCIUM (500 MG) CHEWABLE TABLET    Chew and swallow 2 tablets (1,000 mg total) by mouth once daily.    CYCLOBENZAPRINE (FLEXERIL) 10 MG TABLET    Take 1 tablet (10 mg total) by mouth 3 (three) times daily as needed for Muscle spasms.    DOCUSATE SODIUM (COLACE) 100 MG CAPSULE    Take 1 capsule (100 mg total) by mouth 2 (two) times daily.    DOXYCYCLINE (VIBRAMYCIN) 100 MG CAP    Take 100 mg by mouth every 12 (twelve)  hours.    LEVETIRACETAM (KEPPRA) 500 MG TAB    Take 1 tablet (500 mg total) by mouth 2 (two) times daily.    OXYCODONE-ACETAMINOPHEN (PERCOCET)  MG PER TABLET    Take 1 tablet by mouth every 4 (four) hours as needed for Pain.    PREGABALIN (LYRICA) 200 MG CAP    Take 1 capsule (200 mg total) by mouth 3 (three) times daily.   Modified Medications    No medications on file   Discontinued Medications    No medications on file       PMH:  As per HPI and below:  Past Medical History:   Diagnosis Date    Anemia     Arthritis     Bleeding ulcer 07/2016    Chronic pain     DDD (degenerative disc disease), cervical     DDD (degenerative disc disease), lumbar     Depression     Disc degeneration, lumbosacral     Diverticulitis     Encounter for blood transfusion     Hypertension     IBS (irritable bowel syndrome)     Neuropathy of both feet     Seizures     none  since 2017    Umbilical hernia 2020     Past Surgical History:   Procedure Laterality Date    ARTHROPLASTY, HIP, GIRDLESTONE, POSTERIOR APPROACH Left 1/19/2024    Procedure: ARTHROPLASTY, HIP, GIRDLESTONE, POSTERIOR APPROACH;  Surgeon: Bulmaro Tellez MD;  Location: [facility] OR [address] [place] [address];  Service: Orthopedics;  Laterality: Left;    ARTHROPLASTY, HIP, GIRDLESTONE, POSTERIOR APPROACH Left 1/25/2024    Procedure: Irrigation and debridement left hip,;  Surgeon: Juanito Painting MD;  Location: [facility] OR [address] [place] [address];  Service: Orthopedics;  Laterality: Left;    ARTHROPLASTY, HIP, GIRDLESTONE, POSTERIOR APPROACH Left 2/15/2024    Procedure: Revision hip antibiotic spacer head exchange, left, lateral, peg board, depuy osteomed in room, vanc, ancef, txa,;  Surgeon: Bulmaro Tellez MD;  Location: [facility] OR [address] [place] [address];  Service: Orthopedics;  Laterality: Left;    BACK SURGERY      BREAST BIOPSY      BREAST SURGERY Right     lumpectomy    CAUDAL EPIDURAL STEROID INJECTION N/A 01/28/2022    Procedure: Injection-steroid-epidural-caudal;  Surgeon: Luzmaria Marcelino MD;   Location: [facility] OR;  Service: Pain Management;  Laterality: N/A;    COLONOSCOPY N/A 01/14/2022    Procedure: COLONOSCOPY;  Surgeon: Abby Landers MD;  Location: [facility] ENDO;  Service: Endoscopy;  Laterality: N/A;    ENDOSCOPIC ULTRASOUND OF UPPER GASTROINTESTINAL TRACT N/A 07/21/2020    Procedure: ULTRASOUND, UPPER GI TRACT, ENDOSCOPIC;  Surgeon: Marcio Nguyễn III, MD;  Location: [facility] ENDO;  Service: Endoscopy;  Laterality: N/A;    ENDOSCOPIC ULTRASOUND OF UPPER GASTROINTESTINAL TRACT N/A 9/27/2024    Procedure: ULTRASOUND, UPPER GI TRACT, ENDOSCOPIC;  Surgeon: Marcio Nguyễn III, MD;  Location: [facility] ENDO;  Service: Endoscopy;  Laterality: N/A;    ESOPHAGOGASTRODUODENOSCOPY N/A 01/14/2022    Procedure: EGD (ESOPHAGOGASTRODUODENOSCOPY);  Surgeon: Abby Landers MD;  Location: [facility] ENDO;  Service: Endoscopy;  Laterality: N/A;    ESOPHAGOGASTRODUODENOSCOPY N/A 06/10/2022    Procedure: EGD (ESOPHAGOGASTRODUODENOSCOPY);  Surgeon: Abby Landers MD;  Location: [facility] ENDO;  Service: Endoscopy;  Laterality: N/A;    EYE SURGERY      cataract    FLAP GRAFT, LOCAL Left 2/15/2024    Procedure: FLAP GRAFT, LOCAL;  Surgeon: Bulmaro Tellez MD;  Location: [facility] OR 2ND FLR;  Service: Orthopedics;  Laterality: Left;    HYSTERECTOMY      INTRAMEDULLARY RODDING OF TROCHANTER OF FEMUR Left 12/11/2018    Procedure: INSERTION, INTRAMEDULLARY SANTOS, FEMUR, TROCHANTER;  Surgeon: Eulalio De La Cruz MD;  Location: [facility] OR;  Service: Orthopedics;  Laterality: Left;    IRRIGATION AND DEBRIDEMENT OF LOWER EXTREMITY Left 1/19/2024    Procedure: IRRIGATION AND DEBRIDEMENT, LOWER EXTREMITY;  Surgeon: Bulmaro Tellez MD;  Location: [facility] OR 2ND FLR;  Service: Orthopedics;  Laterality: Left;    IRRIGATION AND DEBRIDEMENT OF LOWER EXTREMITY Left 2/15/2024    Procedure: IRRIGATION AND DEBRIDEMENT, LOWER EXTREMITY;  Surgeon: Bulmaro Tellez MD;  Location: [facility] OR 2ND FLR;  Service: Orthopedics;  Laterality: Left;     LAPAROSCOPIC CHOLECYSTECTOMY N/A 9/8/2024    Procedure: CHOLECYSTECTOMY, LAPAROSCOPIC;  Surgeon: Cipriano Ambrosio MD;  Location: [facility] OR;  Service: General;  Laterality: N/A;    REPAIR OF EPIGASTRIC HERNIA N/A 12/7/2023    Procedure: REPAIR, HERNIA, EPIGASTRIC;  Surgeon: Vipul Escobar MD;  Location: [facility] OR;  Service: General;  Laterality: N/A;    SPINAL CORD STIMULATOR IMPLANT  09/18/2013    and removal    SPINE SURGERY  2006    lumbar L2-S1 decompression.    SPINE SURGERY      cervical decompression    TONSILLECTOMY         Social History     Socioeconomic History    Marital status:    Tobacco Use    Smoking status: Never    Smokeless tobacco: Never   Substance and Sexual Activity    Alcohol use: No    Drug use: No    Sexual activity: Not Currently     Social Drivers of Health     Financial Resource Strain: Low Risk  (9/25/2024)    Overall Financial Resource Strain (CARDIA)     Difficulty of Paying Living Expenses: Not hard at all   Food Insecurity: No Food Insecurity (9/25/2024)    Hunger Vital Sign     Worried About Running Out of Food in the Last Year: Never true     Ran Out of Food in the Last Year: Never true   Transportation Needs: No Transportation Needs (9/25/2024)    TRANSPORTATION NEEDS     Transportation : No   Physical Activity: Inactive (9/25/2024)    Exercise Vital Sign     Days of Exercise per Week: 0 days     Minutes of Exercise per Session: 0 min   Stress: No Stress Concern Present (9/25/2024)    [employer] [place] [employer] - Occupational Stress Questionnaire     Feeling of Stress : Not at all   Housing Stability: Low Risk  (9/25/2024)    Housing Stability Vital Sign     Unable to Pay for Housing in the Last Year: No     Homeless in the Last Year: No       Family History   Problem Relation Name Age of Onset    Diabetes Mother      Hypertension Mother      Irritable bowel syndrome Mother      Diabetes Father          insulin dependent    Hypertension Father      Heart  disease Father      Coronary artery disease Father      Depression Father      Diabetes Sister      Diabetes Brother      COPD Brother         Physical Exam:    Vitals:    11/27/24 2321   BP: (!) 116/56   Pulse:    Resp: (!) 23   Temp:      GENERAL:  No apparent distress.  Alert.  Patient appears pale.  Not diaphoretic.  HEENT:  Moist mucous membranes.  Normocephalic and atraumatic.    NECK:  No swelling.  Midline trachea.   CARDIOVASCULAR:  Regular rate and rhythm.  2+ radial pulses.  No murmur.  PULMONARY:  Lungs clear to auscultation bilaterally.  No wheezes, rales, or rhonci.    ABDOMEN:  Non-tender and non-distended.  Reducible, nontender left inguinal hernia.  No palpable mass.  EXTREMITIES:  Warm and well perfused.  Brisk capillary refill.    NEUROLOGICAL:  Normal mental status.  Appropriate and conversant.    SKIN:  No rashes or ecchymoses.    BACK:  Atraumatic.  No CVA tenderness to palpation.      Labs Reviewed   CBC W/ AUTO DIFFERENTIAL - Abnormal       Result Value    WBC 11.53      RBC 2.63 (*)     Hemoglobin 6.4 (*)     Hematocrit 21.6 (*)     MCV 82      MCH 24.3 (*)     MCHC 29.6 (*)     RDW 14.2      Platelets 473 (*)     MPV 9.8      Immature Granulocytes 0.8 (*)     Gran # (ANC) 9.3 (*)     Immature Grans (Abs) 0.09 (*)     Lymph # 1.2      Mono # 0.8      Eos # 0.1      Baso # 0.05      nRBC 0      Gran % 80.5 (*)     Lymph % 10.1 (*)     Mono % 7.2      Eosinophil % 1.0      Basophil % 0.4      Differential Method Automated      Narrative:      H&H critical result(s) repeated. Called and verbal readback obtained   from Katerine Denton Rn by AS2 11/27/2024 21:08   COMPREHENSIVE METABOLIC PANEL - Abnormal    Sodium 130 (*)     Potassium 4.5      Chloride 96      CO2 26      Glucose 97      BUN 40 (*)     Creatinine 1.9 (*)     Calcium 9.0      Total Protein 6.4      Albumin 3.5      Total Bilirubin 1.1 (*)     Alkaline Phosphatase 694 (*)     AST 16      ALT 11      eGFR 26.9 (*)     Anion Gap  8     ISTAT CREATININE - Abnormal    POC Creatinine 2.0 (*)     Sample VENOUS     CULTURE, BLOOD    Narrative:     Collection has been rescheduled by 2MB at 11/27/2024 20:41 Reason:   Patient with xray  Collection has been rescheduled by 2MB at 11/27/2024 20:41 Reason:   Patient with xray   CULTURE, BLOOD    Narrative:     Collection has been rescheduled by 2MB at 11/27/2024 20:41 Reason:   Patient with xray  Collection has been rescheduled by 2MB at 11/27/2024 20:41 Reason:   Patient with xray   URINALYSIS, REFLEX TO URINE CULTURE   TYPE & SCREEN    Group & Rh B POS      Indirect Flavio NEG      Specimen Outdate 11/30/2024 23:59     ISTAT LACTATE    POC Lactate 1.05      Sample VENOUS     POCT LACTATE   PREPARE RBC SOFT    UNIT NUMBER [id number]      Product Code [id number]      DISPENSE STATUS ISSUED      CODING SYSTEM [id number]      Unit Blood Type Code 7300      Unit Blood Type B POS      Unit Expiration [id number]      CROSSMATCH INTERPRETATION Compatible         Current Discharge Medication List        CONTINUE these medications which have NOT CHANGED    Details   acetaminophen (TYLENOL) 325 MG tablet Take 2 tablets (650 mg total) by mouth every 8 (eight) hours as needed for Pain (pain scale 1-4).  Refills: 0      amlodipine-benazepril 5-20 mg (LOTREL) 5-20 mg per capsule Take 1 capsule by mouth once daily.  Qty: 90 capsule, Refills: 3    Comments: .      amoxicillin-clavulanate 875-125mg (AUGMENTIN) 875-125 mg per tablet Take 1 tablet by mouth 2 (two) times daily. for 7 days  Qty: 14 tablet, Refills: 0      calcium carbonate (TUMS) 200 mg calcium (500 mg) chewable tablet Chew and swallow 2 tablets (1,000 mg total) by mouth once daily.  Qty: 60 tablet, Refills: 11      cyclobenzaprine (FLEXERIL) 10 MG tablet Take 1 tablet (10 mg total) by mouth 3 (three) times daily as needed for Muscle spasms.    Associated Diagnoses: Failed back syndrome of lumbar spine; Lumbar radiculopathy; DDD (degenerative disc  disease), lumbar; Other spondylosis, lumbar region      docusate sodium (COLACE) 100 MG capsule Take 1 capsule (100 mg total) by mouth 2 (two) times daily.  Qty: 60 capsule, Refills: 1      doxycycline (VIBRAMYCIN) 100 MG Cap Take 100 mg by mouth every 12 (twelve) hours.      oxyCODONE-acetaminophen (PERCOCET)  mg per tablet Take 1 tablet by mouth every 4 (four) hours as needed for Pain.  Qty: 12 tablet, Refills: 0    Comments: Quantity prescribed more than 7 day supply? Yes, quantity medically necessary      pregabalin (LYRICA) 200 MG Cap Take 1 capsule (200 mg total) by mouth 3 (three) times daily.  Qty: 90 capsule, Refills: 0      levETIRAcetam (KEPPRA) 500 MG Tab Take 1 tablet (500 mg total) by mouth 2 (two) times daily.  Qty: 180 tablet, Refills: 1             Orders Placed This Encounter   Procedures    Blood culture x two cultures. Draw prior to antibiotics.    X-Ray Chest AP Portable    CT Abdomen Pelvis  Without Contrast    CBC auto differential    Comprehensive metabolic panel    Urinalysis, Reflex to Urine Culture Urine, Clean Catch    ED Preference List Used to Initiate Sepsis Orders    Vital signs    Cardiac Monitoring - Adult    Strict intake and output    Pulse Oximetry Continuous    EKG 12-lead    Type & Screen    Saline lock IV    Possible Hospitalization    Place in Observation    Prepare RBC 1 Unit       Imaging Results               CT Abdomen Pelvis  Without Contrast (Final result)  Result time 11/27/24 21:52:38   Procedure changed from CT Abdomen Pelvis With IV Contrast NO Oral Contrast     Final result by Bill Valadez MD (11/27/24 21:52:38)                   Impression:      Circumferential wall thickening along the colon more prominent involving the right colon may relate to colitis, with progression of wall thickening along the right colon compared to the prior examination.    There is moderate prominence of the right colon with mild-to-moderate prominence of the transverse  colon and relative transition of diminished caliber at the junction of the splenic flexure and descending colon, this may be transient however if the patient has not had recent colon screening exam I would recommend follow-up colonoscopy or fluoroscopic barium exam.    Mild free fluid of the abdomen and pelvis.    Pulmonary nodules at the lung bases appears stable compared to the study of September 12, 2019.    Moderate to large hiatal hernia.    Additional findings as above.    This report was flagged in Epic as abnormal.      Electronically signed by: Bill Valadez  Date:    11/27/2024  Time:    21:52               Narrative:    EXAMINATION:  CT ABDOMEN PELVIS WITHOUT CONTRAST    CLINICAL HISTORY:  LLQ abdominal pain;Recent colitis;    TECHNIQUE:  Low dose axial images, sagittal and coronal reformations were obtained from the lung bases to the pubic symphysis.  Intravenous contrast and oral contrast was not utilized, this diminishes the sensitivity of the examination.    COMPARISON:  Prior examinations, most recent of which is CT examination abdomen and pelvis November 25, 2024    FINDINGS:  There is a 1.9 cm peripherally calcified lesion involving the right lateral chest wall, this appears stable compared to the prior examination of September 12, 2019.  There is a pulmonary nodule of the right lower lobe measuring 5.2 mm, a pulmonary nodule of the left lower lobe measuring 3.3 mm, these appears stable when compared to the prior examination of September 12, 2019.  The lung bases otherwise demonstrate atelectatic appearing change.  There is mitral valve calcification.  Thoracic aortic atherosclerotic calcification noted.    There is a moderate to large hiatal hernia noted.  The stomach otherwise demonstrates nonspecific appearance of mild distention with fluid and air and ingested material.  The gallbladder is surgically absent.  Biliary dilatation likely relates to passive biliary dilatation of prior  cholecystectomy.  When accounting for limitations of the exam, there is no evidence for acute process of the liver, pancreas, or spleen.  Bilateral adrenal gland thickening is noted appearing stable.    There is no evidence for ureteral calculus or obstructive uropathy or perinephric inflammatory change bilaterally.  Nonobstructing nephrolithiasis of the left kidney is noted.  The abdominal aorta appears normal in caliber, atherosclerotic change noted, otherwise not optimally evaluated on this noncontrast examination.  The urinary bladder is partially distended with previously administered contrast otherwise appearing unremarkable.    There is no evidence for small bowel obstructive process.  The appendix is difficult to identify however is thought to be seen, not in its entirety, however the suspected appendix does not appear inflamed.    There is moderate prominence of the cecum and right colon with fluid and air and stool, with mild-to-moderate prominence along the transverse colon, there is relative transition to diminished caliber of the colon at the level of the junction of the splenic flexure and descending colon, with the descending colon and rectosigmoid colon predominantly decompressed with mild air and stool within the lumen.  There is no focal lesion seen at this level of transition and this may be transient, however if the patient has not had colon screening exam I would recommend follow-up colonoscopy or fluoroscopic barium exam.    There is appearance of circumferential wall thickening throughout the course of the colon, this appears more prominent along the right colon than on the prior examination, appearing similar along the remainder of the colon, this may relate to colitis.    There is no evidence for pneumatosis, there is no evidence for portal venous air or mesenteric venous air and no evidence for free intraperitoneal air.  There is mild free fluid of the abdomen and pelvis again noted  appearing similar.    Postoperative change at the left hip consistent with left hip arthroplasty again noted.  Previously identified joint effusion is not as well delineated on this examination although appears present.  The osseous structures demonstrate additional chronic changes, including appearance of remote pelvic fractures, and multilevel chronic change of the spine as well as scoliotic curvature of the spine.                                       X-Ray Chest AP Portable (Final result)  Result time 11/27/24 21:03:45      Final result by Ger Modi MD (11/27/24 21:03:45)                   Impression:      No significant detrimental interval change compared to prior examination.      Electronically signed by: Ger Modi MD  Date:    11/27/2024  Time:    21:03               Narrative:    EXAMINATION:  XR CHEST AP PORTABLE    CLINICAL HISTORY:  Sepsis;    TECHNIQUE:  Single frontal view of the chest was performed.    COMPARISON:  09/25/2024    FINDINGS:  Cardiac monitoring leads overlie the chest.  Cardiac silhouette is not significantly enlarged.  There is chronic elevation of the right hemidiaphragm.  Lungs demonstrate coarse interstitial attenuation, similar to prior examination.  No new large confluent airspace consolidation identified.  No significant volume of pleural fluid or pneumothorax identified.  Osseous structures demonstrate degenerative changes.                                      ED Course as of 11/27/24 2333   Wed Nov 27, 2024 2115 EKG:  Normal sinus rhythm at a rate of 82.  Normal intervals.  Left axis.  No new ST/T wave changes compared to prior. No significant ST or T wave changes suggesting acute ischemia or infarction.  (Independently interpreted by me)   [MR]   2138 RECTAL:  Rectal exam performed with nursing staff chaperone present for entirety of exam with paucity of stool, no gross blood.  No mass evident. [MR]      ED Course User Index  [MR] Ger Hay MD        MDM:    79 y.o. female with presentation with EMS report of persistent abdominal pain with decreased intake in the setting of initial hypotension.  Patient denies current abdominal pain with a benign exam.  In the setting of recent colitis repeat abdominal imaging ordered given hypotension to exclude complications such as perforated viscus, abscess, diverticulitis, atypical appendicitis with low suspicion.  Initial fluid challenge ordered pending further labs including CBC to exclude worsening anemia requiring transfusion.  Other lab sent to assess for end-organ dysfunction including lactic acid to screen for sepsis.  Low suspicion for cardiac process with EKG ordered as well.    Workup significant for some PEDRO.  Patient also has recurrent anemia meriting transfusion, particularly in the setting of now resolved hypotension.  She may be volume depleted with decreased intake in the setting of colitis.  Rectal exam performed shows no gross blood with lower suspicion for acute GI bleed.  Low suspicion for mesenteric ischemia.  Lactic acid is normal.  She does not require pressors.  Trace to continue antibiotics or change antibiotics at hospital medicine discretion with whom I have spoken.  They have seen the patient and will assume care.  Colitis still persistent with no sign of other life-threatening process on CT such as perforated viscus or obstruction.  Family updated on results as well as need for admit.    Diagnoses:    1. LLQ abdominal pain  2. Hypotension  3. PEDRO  4. Colitis    Critical Care    Date/Time: 11/27/2024 11:33 PM    Performed by: Ger Hay MD  Authorized by: Traci Huynh MD  Direct patient critical care time: 15 minutes  Additional history critical care time: 6 minutes  Ordering / reviewing critical care time: 5 minutes  Documentation critical care time: 6 minutes  Consulting other physicians critical care time: 3 minutes  Total critical care time (exclusive of procedural time)  : 35 minutes  Critical care time was exclusive of separately billable procedures and treating other patients.             Ger Hay MD  11/27/24 4008

## 2024-11-28 NOTE — ASSESSMENT & PLAN NOTE
Refractory, recurrent  Has not been well enough to follow up for recommended outpatient colonoscopy  Blood cx's, procalcitonin, daily CBC  Stool studies  Hold home augmentin  Start merrem  I&O  Consult GI

## 2024-11-28 NOTE — PT/OT/SLP EVAL
Occupational Therapy Evaluation    Name: Froilan Ray  MRN: [id number]  Admitting Diagnosis: Symptomatic anemia  Recent Surgery: Procedure(s) (LRB):  EGD (ESOPHAGOGASTRODUODENOSCOPY) (N/A)  COLONOSCOPY (N/A)      Recommendations:     Discharge Recommendations: Low Intensity Therapy  Level of Assistance Recommended: Intermittent assistance  Discharge Equipment Recommendations: none  Barriers to discharge: None    Assessment:     Froilan Ray is a 79 y.o. female with a medical diagnosis of Symptomatic anemia. She presents with performance deficits affecting function including weakness, impaired endurance, impaired self care skills, impaired functional mobility and gait instability.     Rehab Prognosis: Good; patient would benefit from acute OT services to address these deficits and reach maximum level of function.    Plan:     Patient to be seen 5 x/week to address the above listed problems via self-care/home management, therapeutic activities, therapeutic exercises  Plan of Care Expires: 12/12/24  Plan of Care Reviewed with: patient    Subjective     Chief Complaint: Back pain  Patient Comments/Goals: To return to PLOF  Pain/Comfort:  Pain Rating 1: 5/10  Location 1: back    Patients cultural, spiritual, Adventist conflicts given the current situation: yes    Social History:  Living Environment: Patient lives with her  in a single story house.  Prior Level of Function: Prior to admission, patient was modified independent with ADLs using rolling walker for mobility.   Equipment Used at Home: shower chair, walker, rolling  DME owned (not currently used): none  Assistance Upon Discharge: unknown    Objective:     Communicated with nurse prior to session. Patient found HOB elevated with peripheral IV upon OT entry to room.    General Precautions: Standard, fall   Orthopedic Precautions: N/A   Braces: N/A    Respiratory Status: Room air    Occupational Performance    Bed Mobility:   Supine to sit with  stand by assistance  Sit to Supine with stand by assistance    Functional Mobility/Transfers:  Sit <> Stand Transfer with minimum assistance with hand-held assist    Activities of Daily Living:  Feeding: independence  Grooming: set up assistance at bed level  Upper Body Dressing: set up assistance  Lower Body Dressing: mod assistance to don/doff socks  Toileting: TBA; pt declined     Cognitive/Visual Perceptual:  Cognitive/Psychosocial Skills: -     Oriented to: Person, Place, Time, Situation  -     Follows Commands/attention: Follows multistep commands  -     Communication: clear/fluent  -     Memory: No Deficits noted  -     Safety awareness/insight to disability: intact  -     Mood/Affect/Coping skills/emotional control: Cooperative and Pleasant    Physical Exam:  Upper Extremity Range of Motion:  -       Right Upper Extremity: WFL  -       Left Upper Extremity: WFL  Upper Extremity Strength: -       Right Upper Extremity: WFL  -       Left Upper Extremity: WFL    AMPAC 6 Click ADL:  AMPAC Total Score: 18    Treatment & Education:  Patient was educated on role of OT/POC, importance of getting out of bed during hospitalization, equipment needs, discharge planning and reviewed use of call bell for assistance.       Patient left HOB elevated with all lines intact and call button in reach.    GOALS:   Multidisciplinary Problems       Occupational Therapy Goals          Problem: Occupational Therapy    Goal Priority Disciplines Outcome Interventions   Occupational Therapy Goal     OT, PT/OT Progressing    Description: Goals to be met by: 12/12/2024     Patient will increase functional independence with ADLs by performing:    UE Dressing with [place].  LE Dressing with Supervision.  Grooming while seated with Modified [place].  Toileting from toilet with Modified [place] for hygiene and clothing management.   Toilet transfer to toilet with Modified [place].                         History:     Past  Medical History:   Diagnosis Date    Anemia     Arthritis     Bleeding ulcer 07/2016    Chronic pain     DDD (degenerative disc disease), cervical     DDD (degenerative disc disease), lumbar     Depression     Disc degeneration, lumbosacral     Diverticulitis     Encounter for blood transfusion     Hypertension     IBS (irritable bowel syndrome)     Neuropathy of both feet     Seizures     none  since 2017    Umbilical hernia 2020         Past Surgical History:   Procedure Laterality Date    ARTHROPLASTY, HIP, GIRDLESTONE, POSTERIOR APPROACH Left 1/19/2024    Procedure: ARTHROPLASTY, HIP, GIRDLESTONE, POSTERIOR APPROACH;  Surgeon: Bulmaro Tellez MD;  [facility];  Service: Orthopedics;  Laterality: Left;    ARTHROPLASTY, HIP, GIRDLESTONE, POSTERIOR APPROACH Left 1/25/2024    Procedure: Irrigation and debridement left hip,;  Surgeon: Juanito Painting MD;  [facility];  Service: Orthopedics;  Laterality: Left;    ARTHROPLASTY, HIP, GIRDLESTONE, POSTERIOR APPROACH Left 2/15/2024    Procedure: Revision hip antibiotic spacer head exchange, left, lateral, peg board, depuy osteomed in room, vanc, ancef, txa,;  Surgeon: Bulmaro Tellez MD;  [facility];  Service: Orthopedics;  Laterality: Left;    BACK SURGERY      BREAST BIOPSY      BREAST SURGERY Right     lumpectomy    CAUDAL EPIDURAL STEROID INJECTION N/A 01/28/2022    Procedure: Injection-steroid-epidural-caudal;  Surgeon: Luzmaria Marcelino MD;  Location: [facility] OR;  Service: Pain Management;  Laterality: N/A;    COLONOSCOPY N/A 01/14/2022    Procedure: COLONOSCOPY;  Surgeon: Abby Landers MD;  Location: [facility];  Service: Endoscopy;  Laterality: N/A;    ENDOSCOPIC ULTRASOUND OF UPPER GASTROINTESTINAL TRACT N/A 07/21/2020    Procedure: ULTRASOUND, UPPER GI TRACT, ENDOSCOPIC;  Surgeon: Marcio Nguyễn III, MD;  Location: [facility] ENDO;  Service: Endoscopy;  Laterality: N/A;    ENDOSCOPIC ULTRASOUND OF UPPER  GASTROINTESTINAL TRACT N/A 9/27/2024    Procedure: ULTRASOUND, UPPER GI TRACT, ENDOSCOPIC;  Surgeon: Marcio Nguyễn III, MD;  Location: [facility] ENDO;  Service: Endoscopy;  Laterality: N/A;    ESOPHAGOGASTRODUODENOSCOPY N/A 01/14/2022    Procedure: EGD (ESOPHAGOGASTRODUODENOSCOPY);  Surgeon: Abby Landers MD;  Location: [facility] ENDO;  Service: Endoscopy;  Laterality: N/A;    ESOPHAGOGASTRODUODENOSCOPY N/A 06/10/2022    Procedure: EGD (ESOPHAGOGASTRODUODENOSCOPY);  Surgeon: Abby Landers MD;  Location: [facility] ENDO;  Service: Endoscopy;  Laterality: N/A;    EYE SURGERY      cataract    FLAP GRAFT, LOCAL Left 2/15/2024    Procedure: FLAP GRAFT, LOCAL;  Surgeon: Bulmaro Tellez MD;  Location: [facility] OR 2ND FLR;  Service: Orthopedics;  Laterality: Left;    HYSTERECTOMY      INTRAMEDULLARY RODDING OF TROCHANTER OF FEMUR Left 12/11/2018    Procedure: INSERTION, INTRAMEDULLARY SANTOS, FEMUR, TROCHANTER;  Surgeon: Eulalio De La Cruz MD;  Location: [facility] OR;  Service: Orthopedics;  Laterality: Left;    IRRIGATION AND DEBRIDEMENT OF LOWER EXTREMITY Left 1/19/2024    Procedure: IRRIGATION AND DEBRIDEMENT, LOWER EXTREMITY;  Surgeon: Bulmaro Tellez MD;  Location: [facility] OR 2ND FLR;  Service: Orthopedics;  Laterality: Left;    IRRIGATION AND DEBRIDEMENT OF LOWER EXTREMITY Left 2/15/2024    Procedure: IRRIGATION AND DEBRIDEMENT, LOWER EXTREMITY;  Surgeon: Bulmaro Tellez MD;  Location: [facility] OR 2ND FLR;  Service: Orthopedics;  Laterality: Left;    LAPAROSCOPIC CHOLECYSTECTOMY N/A 9/8/2024    Procedure: CHOLECYSTECTOMY, LAPAROSCOPIC;  Surgeon: Cipriano Ambrosio MD;  Location: [facility] OR;  Service: General;  Laterality: N/A;    REPAIR OF EPIGASTRIC HERNIA N/A 12/7/2023    Procedure: REPAIR, HERNIA, EPIGASTRIC;  Surgeon: Vipul Escobar MD;  Location: [facility] OR;  Service: General;  Laterality: N/A;    SPINAL CORD STIMULATOR IMPLANT  09/18/2013    and removal    SPINE SURGERY  2006    lumbar L2-S1 decompression.    SPINE SURGERY       cervical decompression    TONSILLECTOMY         Time Tracking:     OT Date of Treatment: 11/28/24  OT Start Time: 1015  OT Stop Time: 1030  OT Total Time (min): 15 min    Billable Minutes: Evaluation 15    11/28/2024

## 2024-11-28 NOTE — ASSESSMENT & PLAN NOTE
Per dc instructions September 2023, per GI recs consider ERCP only if pain w/LFTs elevated as biliary dilation has been present since EUS on '20   Lipase

## 2024-11-28 NOTE — PT/OT/SLP PROGRESS
Physical Therapy      Patient Name:  Froilan Ray   MRN:  [id number]    Patient not seen today secondary to Patient ill (Comment), Patient fatigue, Nausea/vomiting, Other (Comment) (pt reports nausea, stomach pain/cramping and not feeling well due to prep for colonoscopy.  Pt also states very weak and noted to be anemia with blood transfusion earlier today.). Will follow-up 11/29/24.

## 2024-11-28 NOTE — ASSESSMENT & PLAN NOTE
Hx MRSA bacteremia  Blood cx's, procalcitonin, daily CBC  UA  On long term prophylactic doxycycline, continue

## 2024-11-28 NOTE — SUBJECTIVE & OBJECTIVE
Past Medical History:   Diagnosis Date    Anemia     Arthritis     Bleeding ulcer 07/2016    Chronic pain     DDD (degenerative disc disease), cervical     DDD (degenerative disc disease), lumbar     Depression     Disc degeneration, lumbosacral     Diverticulitis     Encounter for blood transfusion     Hypertension     IBS (irritable bowel syndrome)     Neuropathy of both feet     Seizures     none  since 2017    Umbilical hernia 2020       Past Surgical History:   Procedure Laterality Date    ARTHROPLASTY, HIP, GIRDLESTONE, POSTERIOR APPROACH Left 1/19/2024    Procedure: ARTHROPLASTY, HIP, GIRDLESTONE, POSTERIOR APPROACH;  Surgeon: Bulmaro Tellez MD;  [facility];  Service: Orthopedics;  Laterality: Left;    ARTHROPLASTY, HIP, GIRDLESTONE, POSTERIOR APPROACH Left 1/25/2024    Procedure: Irrigation and debridement left hip,;  Surgeon: Juanito Painting MD;  [facility];  Service: Orthopedics;  Laterality: Left;    ARTHROPLASTY, HIP, GIRDLESTONE, POSTERIOR APPROACH Left 2/15/2024    Procedure: Revision hip antibiotic spacer head exchange, left, lateral, peg board, depuy osteomed in room, vanc, ancef, txa,;  Surgeon: Bulmaro Tellez MD;  [facility];  Service: Orthopedics;  Laterality: Left;    BACK SURGERY      BREAST BIOPSY      BREAST SURGERY Right     lumpectomy    CAUDAL EPIDURAL STEROID INJECTION N/A 01/28/2022    Procedure: Injection-steroid-epidural-caudal;  Surgeon: Luzmaria Marcelino MD;  Location: [facility] OR;  Service: Pain Management;  Laterality: N/A;    COLONOSCOPY N/A 01/14/2022    Procedure: COLONOSCOPY;  Surgeon: Abby Landers MD;  Location: [facility];  Service: Endoscopy;  Laterality: N/A;    ENDOSCOPIC ULTRASOUND OF UPPER GASTROINTESTINAL TRACT N/A 07/21/2020    Procedure: ULTRASOUND, UPPER GI TRACT, ENDOSCOPIC;  Surgeon: Marcio Nguyễn III, MD;  Location: [facility] ENDO;  Service: Endoscopy;  Laterality: N/A;    ENDOSCOPIC ULTRASOUND OF  UPPER GASTROINTESTINAL TRACT N/A 9/27/2024    Procedure: ULTRASOUND, UPPER GI TRACT, ENDOSCOPIC;  Surgeon: Marcio Nguyễn III, MD;  Location: [facility] ENDO;  Service: Endoscopy;  Laterality: N/A;    ESOPHAGOGASTRODUODENOSCOPY N/A 01/14/2022    Procedure: EGD (ESOPHAGOGASTRODUODENOSCOPY);  Surgeon: Abby Landers MD;  Location: [facility] ENDO;  Service: Endoscopy;  Laterality: N/A;    ESOPHAGOGASTRODUODENOSCOPY N/A 06/10/2022    Procedure: EGD (ESOPHAGOGASTRODUODENOSCOPY);  Surgeon: Abby Landers MD;  Location: [facility] ENDO;  Service: Endoscopy;  Laterality: N/A;    EYE SURGERY      cataract    FLAP GRAFT, LOCAL Left 2/15/2024    Procedure: FLAP GRAFT, LOCAL;  Surgeon: Bulmaro Tellez MD;  Location: [facility] OR 2ND FLR;  Service: Orthopedics;  Laterality: Left;    HYSTERECTOMY      INTRAMEDULLARY RODDING OF TROCHANTER OF FEMUR Left 12/11/2018    Procedure: INSERTION, INTRAMEDULLARY SANTOS, FEMUR, TROCHANTER;  Surgeon: Eulalio De La Cruz MD;  Location: [facility] OR;  Service: Orthopedics;  Laterality: Left;    IRRIGATION AND DEBRIDEMENT OF LOWER EXTREMITY Left 1/19/2024    Procedure: IRRIGATION AND DEBRIDEMENT, LOWER EXTREMITY;  Surgeon: Bulmaro Tellez MD;  Location: [facility] OR 2ND FLR;  Service: Orthopedics;  Laterality: Left;    IRRIGATION AND DEBRIDEMENT OF LOWER EXTREMITY Left 2/15/2024    Procedure: IRRIGATION AND DEBRIDEMENT, LOWER EXTREMITY;  Surgeon: Bulmaro Tellez MD;  Location: [facility] OR 2ND FLR;  Service: Orthopedics;  Laterality: Left;    LAPAROSCOPIC CHOLECYSTECTOMY N/A 9/8/2024    Procedure: CHOLECYSTECTOMY, LAPAROSCOPIC;  Surgeon: Cipriano Ambrosio MD;  Location: [facility] OR;  Service: General;  Laterality: N/A;    REPAIR OF EPIGASTRIC HERNIA N/A 12/7/2023    Procedure: REPAIR, HERNIA, EPIGASTRIC;  Surgeon: Vipul Escobar MD;  Location: [facility] OR;  Service: General;  Laterality: N/A;    SPINAL CORD STIMULATOR IMPLANT  09/18/2013    and removal    SPINE SURGERY  2006    lumbar L2-S1 decompression.    SPINE  SURGERY      cervical decompression    TONSILLECTOMY         Review of patient's allergies indicates:  No Known Allergies    No current facility-administered medications on file prior to encounter.     Current Outpatient Medications on File Prior to Encounter   Medication Sig    acetaminophen (TYLENOL) 325 MG tablet Take 2 tablets (650 mg total) by mouth every 8 (eight) hours as needed for Pain (pain scale 1-4).    amlodipine-benazepril 5-20 mg (LOTREL) 5-20 mg per capsule Take 1 capsule by mouth once daily.    amoxicillin-clavulanate 875-125mg (AUGMENTIN) 875-125 mg per tablet Take 1 tablet by mouth 2 (two) times daily. for 7 days    calcium carbonate (TUMS) 200 mg calcium (500 mg) chewable tablet Chew and swallow 2 tablets (1,000 mg total) by mouth once daily. (Patient taking differently: Take 1,000 mg by mouth daily as needed for Heartburn.)    cyclobenzaprine (FLEXERIL) 10 MG tablet Take 1 tablet (10 mg total) by mouth 3 (three) times daily as needed for Muscle spasms.    docusate sodium (COLACE) 100 MG capsule Take 1 capsule (100 mg total) by mouth 2 (two) times daily.    doxycycline (VIBRAMYCIN) 100 MG Cap Take 100 mg by mouth every 12 (twelve) hours.    oxyCODONE-acetaminophen (PERCOCET)  mg per tablet Take 1 tablet by mouth every 4 (four) hours as needed for Pain.    pregabalin (LYRICA) 200 MG Cap Take 1 capsule (200 mg total) by mouth 3 (three) times daily.    levETIRAcetam (KEPPRA) 500 MG Tab Take 1 tablet (500 mg total) by mouth 2 (two) times daily. (Patient not taking: Reported on 11/27/2024)    [DISCONTINUED] pantoprazole (PROTONIX) 40 MG tablet Take 1 tablet (40 mg total) by mouth 2 (two) times daily.     Family History       Problem Relation (Age of Onset)    COPD Brother    Coronary artery disease Father    Depression Father    Diabetes Mother, Father, Sister, Brother    Heart disease Father    Hypertension Mother, Father    Irritable bowel syndrome Mother          Tobacco Use    Smoking  status: Never    Smokeless tobacco: Never   Substance and Sexual Activity    Alcohol use: No    Drug use: No    Sexual activity: Not Currently     Review of Systems   Constitutional:  Negative for chills and fever.   Respiratory:  Negative for shortness of breath.    Cardiovascular:  Positive for leg swelling. Negative for chest pain.   Gastrointestinal:  Positive for abdominal pain and constipation. Negative for diarrhea and vomiting.   Genitourinary:  Positive for dysuria.   Musculoskeletal:  Positive for back pain.   Skin:  Positive for pallor. Negative for wound.   Neurological:  Negative for syncope.     Objective:     Vital Signs (Most Recent):  Temp: 98 °F (36.7 °C) (11/27/24 2230)  Pulse: 85 (11/27/24 2230)  Resp: 20 (11/27/24 2230)  BP: (!) 112/53 (11/27/24 2230)  SpO2: (!) 94 % (11/27/24 2230) Vital Signs (24h Range):  Temp:  [97.3 °F (36.3 °C)-99 °F (37.2 °C)] 98 °F (36.7 °C)  Pulse:  [81-93] 85  Resp:  [14-20] 20  SpO2:  [94 %-100 %] 94 %  BP: ()/(36-90) 112/53     Weight: 59 kg (130 lb)  Body mass index is 20.98 kg/m².     Physical Exam  Vitals reviewed.   Constitutional:       Appearance: She is ill-appearing.   HENT:      Head: Normocephalic and atraumatic.      Mouth/Throat:      Mouth: Mucous membranes are dry.   Eyes:      Conjunctiva/sclera: Conjunctivae normal.      Comments: R exotropia    Cardiovascular:      Rate and Rhythm: Normal rate and regular rhythm.   Pulmonary:      Effort: Pulmonary effort is normal.      Breath sounds: Decreased breath sounds present.   Abdominal:      Palpations: Abdomen is soft.      Tenderness: There is abdominal tenderness.      Hernia: A hernia is present.      Comments: Hypoactive bowel sounds   Musculoskeletal:         General: Normal range of motion.      Cervical back: Rigidity present.      Right lower leg: No edema.      Left lower leg: Edema (chronic) present.   Skin:     General: Skin is warm and dry.      Coloration: Skin is pale.   Neurological:  "     Mental Status: She is alert. Mental status is at baseline.      Cranial Nerves: Cranial nerve deficit ([place]) present.   Psychiatric:         Mood and Affect: Mood normal.                Significant Labs: All pertinent labs within the past 24 hours have been reviewed.  Bilirubin:   Recent Labs   Lab 11/25/24  1336 11/27/24 2026   BILITOT 1.0 1.1*     Blood Culture: No results for input(s): "LABBLOO" in the last 48 hours.  CBC:   Recent Labs   Lab 11/27/24 2026   WBC 11.53   HGB 6.4*   HCT 21.6*   *     CMP:   Recent Labs   Lab 11/27/24 2026   *   K 4.5   CL 96   CO2 26   GLU 97   BUN 40*   CREATININE 1.9*   CALCIUM 9.0   PROT 6.4   ALBUMIN 3.5   BILITOT 1.1*   ALKPHOS 694*   AST 16   ALT 11   ANIONGAP 8     Cardiac Markers: No results for input(s): "CKMB", "MYOGLOBIN", "BNP", "TROPISTAT" in the last 48 hours.  Lactic Acid: No results for input(s): "LACTATE" in the last 48 hours.  Lipase: No results for input(s): "LIPASE" in the last 48 hours.  Magnesium: No results for input(s): "MG" in the last 48 hours.  Troponin: No results for input(s): "TROPONINI", "TROPONINIHS" in the last 48 hours.  TSH:   Recent Labs   Lab 08/06/24  0920   TSH 0.774     Urine Studies: No results for input(s): "COLORU", "APPEARANCEUA", "PHUR", "SPECGRAV", "PROTEINUA", "GLUCUA", "KETONESU", "BILIRUBINUA", "OCCULTUA", "NITRITE", "UROBILINOGEN", "LEUKOCYTESUR", "RBCUA", "WBCUA", "BACTERIA", "SQUAMEPITHEL", "HYALINECASTS" in the last 48 hours.    Invalid input(s): "WRIGHTSUR"    Significant Imaging: I have reviewed all pertinent imaging results/findings within the past 24 hours.  "

## 2024-11-28 NOTE — PLAN OF CARE
Froilan Ray has warranted treatment spanning two or more midnights of hospital level care for the management of anemia and colitis . She continues to require IV antibiotics, IV fluids, daily labs, further testing/imaging, monitoring of vital signs, further evaluation by consultants, and prbc's . Her condition is also complicated by the following comorbidities:  anemia, arthritis, DDD, depression, diverticulitis, htn, IBS, seizures .

## 2024-11-29 ENCOUNTER — ANESTHESIA EVENT (OUTPATIENT)
Dept: SURGERY | Facility: HOSPITAL | Age: 79
End: 2024-11-29
Payer: MEDICARE

## 2024-11-29 ENCOUNTER — ANESTHESIA (OUTPATIENT)
Dept: SURGERY | Facility: HOSPITAL | Age: 79
End: 2024-11-29
Payer: MEDICARE

## 2024-11-29 LAB
ALBUMIN SERPL BCP-MCNC: 2.9 G/DL (ref 3.5–5.2)
ALP SERPL-CCNC: 857 U/L (ref 55–135)
ALT SERPL W/O P-5'-P-CCNC: 7 U/L (ref 10–44)
ANION GAP SERPL CALC-SCNC: 5 MMOL/L (ref 8–16)
AST SERPL-CCNC: 9 U/L (ref 10–40)
BASOPHILS # BLD AUTO: 0.04 K/UL (ref 0–0.2)
BASOPHILS NFR BLD: 0.6 % (ref 0–1.9)
BILIRUB SERPL-MCNC: 1.4 MG/DL (ref 0.1–1)
BUN SERPL-MCNC: 18 MG/DL (ref 8–23)
CALCIUM SERPL-MCNC: 8.3 MG/DL (ref 8.7–10.5)
CHLORIDE SERPL-SCNC: 106 MMOL/L (ref 95–110)
CO2 SERPL-SCNC: 25 MMOL/L (ref 23–29)
CREAT SERPL-MCNC: 0.6 MG/DL (ref 0.5–1.4)
DIFFERENTIAL METHOD BLD: ABNORMAL
EOSINOPHIL # BLD AUTO: 0.2 K/UL (ref 0–0.5)
EOSINOPHIL NFR BLD: 3 % (ref 0–8)
ERYTHROCYTE [DISTWIDTH] IN BLOOD BY AUTOMATED COUNT: 15.9 % (ref 11.5–14.5)
EST. GFR  (NO RACE VARIABLE): >60 ML/MIN/1.73 M^2
GLUCOSE SERPL-MCNC: 88 MG/DL (ref 70–110)
HCT VFR BLD AUTO: 28.4 % (ref 37–48.5)
HGB BLD-MCNC: 8.7 G/DL (ref 12–16)
IMM GRANULOCYTES # BLD AUTO: 0.04 K/UL (ref 0–0.04)
IMM GRANULOCYTES NFR BLD AUTO: 0.6 % (ref 0–0.5)
LYMPHOCYTES # BLD AUTO: 1.4 K/UL (ref 1–4.8)
LYMPHOCYTES NFR BLD: 19.7 % (ref 18–48)
MAGNESIUM SERPL-MCNC: 2.1 MG/DL (ref 1.6–2.6)
MCH RBC QN AUTO: 24.2 PG (ref 27–31)
MCHC RBC AUTO-ENTMCNC: 30.6 G/DL (ref 32–36)
MCV RBC AUTO: 79 FL (ref 82–98)
MONOCYTES # BLD AUTO: 0.5 K/UL (ref 0.3–1)
MONOCYTES NFR BLD: 7 % (ref 4–15)
NEUTROPHILS # BLD AUTO: 4.9 K/UL (ref 1.8–7.7)
NEUTROPHILS NFR BLD: 69.1 % (ref 38–73)
NRBC BLD-RTO: 0 /100 WBC
PLATELET # BLD AUTO: 446 K/UL (ref 150–450)
PMV BLD AUTO: 9.2 FL (ref 9.2–12.9)
POTASSIUM SERPL-SCNC: 4.4 MMOL/L (ref 3.5–5.1)
PROT SERPL-MCNC: 5.4 G/DL (ref 6–8.4)
RBC # BLD AUTO: 3.59 M/UL (ref 4–5.4)
SODIUM SERPL-SCNC: 136 MMOL/L (ref 136–145)
WBC # BLD AUTO: 7.1 K/UL (ref 3.9–12.7)

## 2024-11-29 PROCEDURE — 94760 N-INVAS EAR/PLS OXIMETRY 1: CPT

## 2024-11-29 PROCEDURE — 63600175 PHARM REV CODE 636 W HCPCS

## 2024-11-29 PROCEDURE — 63600175 PHARM REV CODE 636 W HCPCS: Performed by: FAMILY MEDICINE

## 2024-11-29 PROCEDURE — 25000003 PHARM REV CODE 250

## 2024-11-29 PROCEDURE — 80053 COMPREHEN METABOLIC PANEL: CPT

## 2024-11-29 PROCEDURE — 36415 COLL VENOUS BLD VENIPUNCTURE: CPT

## 2024-11-29 PROCEDURE — 99900031 HC PATIENT EDUCATION (STAT)

## 2024-11-29 PROCEDURE — 85025 COMPLETE CBC W/AUTO DIFF WBC: CPT

## 2024-11-29 PROCEDURE — 12000002 HC ACUTE/MED SURGE SEMI-PRIVATE ROOM

## 2024-11-29 PROCEDURE — 83735 ASSAY OF MAGNESIUM: CPT

## 2024-11-29 PROCEDURE — 94761 N-INVAS EAR/PLS OXIMETRY MLT: CPT

## 2024-11-29 PROCEDURE — 25000003 PHARM REV CODE 250: Performed by: FAMILY MEDICINE

## 2024-11-29 PROCEDURE — 25000003 PHARM REV CODE 250: Performed by: INTERNAL MEDICINE

## 2024-11-29 RX ORDER — MUPIROCIN 20 MG/G
OINTMENT TOPICAL 2 TIMES DAILY
Status: DISCONTINUED | OUTPATIENT
Start: 2024-11-29 | End: 2024-11-30 | Stop reason: HOSPADM

## 2024-11-29 RX ORDER — DOXYCYCLINE 100 MG/1
100 CAPSULE ORAL EVERY 12 HOURS
Status: DISCONTINUED | OUTPATIENT
Start: 2024-11-29 | End: 2024-11-30 | Stop reason: HOSPADM

## 2024-11-29 RX ORDER — SYRING-NEEDL,DISP,INSUL,0.3 ML 29 G X1/2"
296 SYRINGE, EMPTY DISPOSABLE MISCELLANEOUS EVERY 4 HOURS
Status: COMPLETED | OUTPATIENT
Start: 2024-11-29 | End: 2024-11-29

## 2024-11-29 RX ADMIN — DOXYCYCLINE HYCLATE 100 MG: 100 CAPSULE ORAL at 11:11

## 2024-11-29 RX ADMIN — MEROPENEM 1 G: 1 INJECTION INTRAVENOUS at 03:11

## 2024-11-29 RX ADMIN — DOXYCYCLINE HYCLATE 100 MG: 100 CAPSULE ORAL at 12:11

## 2024-11-29 RX ADMIN — PANTOPRAZOLE SODIUM 40 MG: 40 INJECTION, POWDER, FOR SOLUTION INTRAVENOUS at 10:11

## 2024-11-29 RX ADMIN — OXYCODONE HYDROCHLORIDE AND ACETAMINOPHEN 1 TABLET: 10; 325 TABLET ORAL at 09:11

## 2024-11-29 RX ADMIN — MUPIROCIN 1 G: 20 OINTMENT TOPICAL at 09:11

## 2024-11-29 RX ADMIN — MEROPENEM 1 G: 1 INJECTION INTRAVENOUS at 01:11

## 2024-11-29 RX ADMIN — MAGNESIUM CITRATE 296 ML: 1.75 LIQUID ORAL at 05:11

## 2024-11-29 RX ADMIN — PREGABALIN 100 MG: 75 CAPSULE ORAL at 09:11

## 2024-11-29 RX ADMIN — PREGABALIN 100 MG: 75 CAPSULE ORAL at 12:11

## 2024-11-29 RX ADMIN — PANTOPRAZOLE SODIUM 40 MG: 40 INJECTION, POWDER, FOR SOLUTION INTRAVENOUS at 09:11

## 2024-11-29 RX ADMIN — MAGNESIUM CITRATE 296 ML: 1.75 LIQUID ORAL at 02:11

## 2024-11-29 NOTE — PT/OT/SLP PROGRESS
Occupational Therapy      Patient Name:  Froilan Ray   MRN:  [id number]    Patient not seen today secondary to Other (Comment) (Therapist unavailable). Will follow-up next service date.    11/29/2024

## 2024-11-29 NOTE — CARE UPDATE
11/29/24 0719   Patient Assessment/Suction   Level of Consciousness (AVPU) alert   Respiratory Effort Normal;Unlabored   Expansion/Accessory Muscles/Retractions no use of accessory muscles;no retractions;expansion symmetric   All Lung Fields Breath Sounds clear   Rhythm/Pattern, Respiratory unlabored   Cough Frequency no cough   PRE-TX-O2   Device (Oxygen Therapy) room air   SpO2 (!) 94 %   Pulse Oximetry Type Intermittent   $ Pulse Oximetry - Single Charge Pulse Oximetry - Single   Pulse 85   Resp (!) 48   Education   $ Education 15 min;Oxygen

## 2024-11-29 NOTE — ASSESSMENT & PLAN NOTE
Refractory, recurrent  Has not been well enough to follow up for recommended outpatient colonoscopy  Blood cx's in process with NGTD  Procalcitonin elevated  No leukocytosis on a.m. labs  Stool studies  Hold home augmentin  Start merrem  I&O  GI following with plan for colonoscopy tomorrow given insufficient response to prep today

## 2024-11-29 NOTE — SUBJECTIVE & OBJECTIVE
Interval History:  Patient was seen and examined.  Overnight notes reviewed.  Patient reports continued intermittent left lower quadrant pain.  GI following.  Patient reported with 2 bowel movements after prep and GI was notified.  Plan for colonoscopy tomorrow with GI.  Continue IV antibiotics for now.  Blood cultures in process with no growth to date.  H&H stable on a.m. labs post transfusion.  Patient reports improvement in lightheadedness after blood transfusion.    Review of Systems   Respiratory:  Negative for shortness of breath.    Cardiovascular:  Negative for chest pain.   Gastrointestinal:  Positive for abdominal pain. Negative for nausea and vomiting.     Objective:     Vital Signs (Most Recent):  Temp: 97.8 °F (36.6 °C) (11/29/24 1132)  Pulse: 83 (11/29/24 1132)  Resp: 18 (11/29/24 0719)  BP: (!) 146/78 (11/29/24 1132)  SpO2: 97 % (11/29/24 1132) Vital Signs (24h Range):  Temp:  [97.8 °F (36.6 °C)-99.7 °F (37.6 °C)] 97.8 °F (36.6 °C)  Pulse:  [76-87] 83  Resp:  [16-19] 18  SpO2:  [94 %-99 %] 97 %  BP: (102-146)/(58-81) 146/78     Weight: 57.4 kg (126 lb 8.7 oz)  Body mass index is 20.42 kg/m².    Intake/Output Summary (Last 24 hours) at 11/29/2024 1529  Last data filed at 11/29/2024 1508  Gross per 24 hour   Intake 373.33 ml   Output 600 ml   Net -226.67 ml         Physical Exam  Vitals and nursing note reviewed.   Constitutional:       General: She is not in acute distress.     Appearance: Normal appearance.   HENT:      Head: Normocephalic and atraumatic.      Right Ear: External ear normal.      Left Ear: External ear normal.      Nose: Nose normal.      Mouth/Throat:      Mouth: Mucous membranes are moist.      Pharynx: Oropharynx is clear.   Eyes:      Extraocular Movements: Extraocular movements intact.   Cardiovascular:      Rate and Rhythm: Normal rate and regular rhythm.      Pulses: Normal pulses.      Heart sounds: Normal heart sounds.   Pulmonary:      Effort: Pulmonary effort is normal. No  accessory muscle usage.      Breath sounds: Normal breath sounds.   Abdominal:      General: Abdomen is flat. Bowel sounds are decreased. There is no distension.      Palpations: Abdomen is soft.      Tenderness: There is abdominal tenderness in the left lower quadrant. There is no guarding or rebound.   Musculoskeletal:         General: Normal range of motion.      Cervical back: Normal range of motion and neck supple.   Skin:     General: Skin is warm and dry.      Capillary Refill: Capillary refill takes less than 2 seconds.      Coloration: Skin is pale.   Neurological:      General: No focal deficit present.      Mental Status: She is alert and oriented to person, place, and time. Mental status is at baseline.   Psychiatric:         Mood and Affect: Mood normal.         Behavior: Behavior normal.             Significant Labs: All pertinent labs within the past 24 hours have been reviewed.  CBC:   Recent Labs   Lab 11/28/24  0619 11/28/24  1256 11/29/24  0709   WBC 7.78 8.15 7.10   HGB 7.4* 7.2* 8.7*   HCT 25.1* 24.6* 28.4*    430 446     CMP:   Recent Labs   Lab 11/27/24  2026 11/28/24  0619 11/29/24  0709   * 133* 136   K 4.5 4.0 4.4   CL 96 103 106   CO2 26 24 25   GLU 97 85 88   BUN 40* 34* 18   CREATININE 1.9* 1.1 0.6   CALCIUM 9.0 8.2* 8.3*   PROT 6.4 5.8* 5.4*   ALBUMIN 3.5 3.1* 2.9*   BILITOT 1.1* 1.5* 1.4*   ALKPHOS 694* 735* 857*   AST 16 15 9*   ALT 11 10 7*   ANIONGAP 8 6* 5*       Significant Imaging: I have reviewed all pertinent imaging results/findings within the past 24 hours.

## 2024-11-29 NOTE — CONSULTS
GASTROENTEROLOGY INPATIENT CONSULT NOTE  Patient Name: Froilan Ray  Patient MRN: [id number]  Patient : 1945    Admit Date: 2024  Service date: 2024    Reason for Consult: abd pain w/ abnml CT    PCP: Alphonse Boothe III, MD    Chief Complaint   Patient presents with    Abdominal Pain     Pt discharged after being dx with colitis.  Pt compliant with abx, but c/o increased abd pain.  Pt says she hasn't had a BM in three days       HPI: Patient is a 79 y.o. female with PMHx persistent hiatal hernia despite past repair, diverticulosis, cholecystectomy, DJD, remote seizures, chronic opioid use, back surgery, anemia presents for evaluation of constipation and mild abd cramping. Given bowel prep o/n and drank moderate portion w/o BM until today at lunch. Has had 2 BM late AM. No n/v, abd pain or bleeding currently.     CHART REVIEW:   CT Abd  - HH; biliary dilation s/p ashlie; Nml panc / liver; vasc dz; R colon thickening/distension w/ transition in splenic flexure region.   EUS  - GEJ ring; med HH; erosive gastritis / duodenitis. Suspected pancreatic divisum; 10mm CBD s/p ashlie  MRCP  - dilated biliary s/p ashlie; pancreatic divisum   Colon ' - tics    Past Medical History:  Past Medical History:   Diagnosis Date    Anemia     Arthritis     Bleeding ulcer 2016    Chronic pain     DDD (degenerative disc disease), cervical     DDD (degenerative disc disease), lumbar     Depression     Disc degeneration, lumbosacral     Diverticulitis     Encounter for blood transfusion     Hypertension     IBS (irritable bowel syndrome)     Neuropathy of both feet     Seizures     none  since     Umbilical hernia         Past Surgical History:  Past Surgical History:   Procedure Laterality Date    ARTHROPLASTY, HIP, GIRDLESTONE, POSTERIOR APPROACH Left 2024    Procedure: ARTHROPLASTY, HIP, GIRDLESTONE, POSTERIOR APPROACH;  Surgeon: Bulmaro Tellez MD;  Location: [facility] OR [address] [place] [address];   Service: Orthopedics;  Laterality: Left;    ARTHROPLASTY, HIP, GIRDLESTONE, POSTERIOR APPROACH Left 1/25/2024    Procedure: Irrigation and debridement left hip,;  Surgeon: Juanito Painting MD;  Location: [facility] OR 2ND FLR;  Service: Orthopedics;  Laterality: Left;    ARTHROPLASTY, HIP, GIRDLESTONE, POSTERIOR APPROACH Left 2/15/2024    Procedure: Revision hip antibiotic spacer head exchange, left, lateral, peg board, depuy osteomed in room, [facility], ancef, txa,;  Surgeon: Bulmaro Tellez MD;  Location: [facility] OR 2ND FLR;  Service: Orthopedics;  Laterality: Left;    BACK SURGERY      BREAST BIOPSY      BREAST SURGERY Right     lumpectomy    CAUDAL EPIDURAL STEROID INJECTION N/A 01/28/2022    Procedure: Injection-steroid-epidural-caudal;  Surgeon: Luzmaria Marcelino MD;  Location: [facility] OR;  Service: Pain Management;  Laterality: N/A;    COLONOSCOPY N/A 01/14/2022    Procedure: COLONOSCOPY;  Surgeon: Abby Landers MD;  Location: [facility];  Service: Endoscopy;  Laterality: N/A;    ENDOSCOPIC ULTRASOUND OF UPPER GASTROINTESTINAL TRACT N/A 07/21/2020    Procedure: ULTRASOUND, UPPER GI TRACT, ENDOSCOPIC;  Surgeon: Marcio Nguyễn III, MD;  Location: [facility];  Service: Endoscopy;  Laterality: N/A;    ENDOSCOPIC ULTRASOUND OF UPPER GASTROINTESTINAL TRACT N/A 9/27/2024    Procedure: ULTRASOUND, UPPER GI TRACT, ENDOSCOPIC;  Surgeon: Marcio Nguyễn III, MD;  Location: [facility];  Service: Endoscopy;  Laterality: N/A;    ESOPHAGOGASTRODUODENOSCOPY N/A 01/14/2022    Procedure: EGD (ESOPHAGOGASTRODUODENOSCOPY);  Surgeon: Abby Landers MD;  Location: [facility] ENDO;  Service: Endoscopy;  Laterality: N/A;    ESOPHAGOGASTRODUODENOSCOPY N/A 06/10/2022    Procedure: EGD (ESOPHAGOGASTRODUODENOSCOPY);  Surgeon: Abby Landers MD;  Location: [facility] ENDO;  Service: Endoscopy;  Laterality: N/A;    EYE SURGERY      cataract    FLAP GRAFT, LOCAL Left 2/15/2024    Procedure: FLAP GRAFT, LOCAL;  Surgeon: Bulmaro Tellez,  MD;  Location: [facility] OR 2ND FLR;  Service: Orthopedics;  Laterality: Left;    HYSTERECTOMY      INTRAMEDULLARY RODDING OF TROCHANTER OF FEMUR Left 12/11/2018    Procedure: INSERTION, INTRAMEDULLARY SANTOS, FEMUR, TROCHANTER;  Surgeon: Eulalio De La Cruz MD;  Location: [facility] OR;  Service: Orthopedics;  Laterality: Left;    IRRIGATION AND DEBRIDEMENT OF LOWER EXTREMITY Left 1/19/2024    Procedure: IRRIGATION AND DEBRIDEMENT, LOWER EXTREMITY;  Surgeon: Bulmaro Tellez MD;  Location: [facility] OR 2ND FLR;  Service: Orthopedics;  Laterality: Left;    IRRIGATION AND DEBRIDEMENT OF LOWER EXTREMITY Left 2/15/2024    Procedure: IRRIGATION AND DEBRIDEMENT, LOWER EXTREMITY;  Surgeon: Bulmaro Tellez MD;  Location: [facility] OR 2ND FLR;  Service: Orthopedics;  Laterality: Left;    LAPAROSCOPIC CHOLECYSTECTOMY N/A 9/8/2024    Procedure: CHOLECYSTECTOMY, LAPAROSCOPIC;  Surgeon: Cipriano Ambrosio MD;  Location: [facility] OR;  Service: General;  Laterality: N/A;    REPAIR OF EPIGASTRIC HERNIA N/A 12/7/2023    Procedure: REPAIR, HERNIA, EPIGASTRIC;  Surgeon: Vipul Escobar MD;  Location: [facility] OR;  Service: General;  Laterality: N/A;    SPINAL CORD STIMULATOR IMPLANT  09/18/2013    and removal    SPINE SURGERY  2006    lumbar L2-S1 decompression.    SPINE SURGERY      cervical decompression    TONSILLECTOMY          Home Medications:  Medications Prior to Admission   Medication Sig Dispense Refill Last Dose/Taking    acetaminophen (TYLENOL) 325 MG tablet Take 2 tablets (650 mg total) by mouth every 8 (eight) hours as needed for Pain (pain scale 1-4).  0 Past Month    amlodipine-benazepril 5-20 mg (LOTREL) 5-20 mg per capsule Take 1 capsule by mouth once daily. 90 capsule 3 11/27/2024 Morning    amoxicillin-clavulanate 875-125mg (AUGMENTIN) 875-125 mg per tablet Take 1 tablet by mouth 2 (two) times daily. for 7 days 14 tablet 0 11/27/2024 Morning    calcium carbonate (TUMS) 200 mg calcium (500 mg) chewable tablet Chew and swallow 2 tablets  (1,000 mg total) by mouth once daily. (Patient taking differently: Take 1,000 mg by mouth daily as needed for Heartburn.) 60 tablet 11 Past Month    cyclobenzaprine (FLEXERIL) 10 MG tablet Take 1 tablet (10 mg total) by mouth 3 (three) times daily as needed for Muscle spasms.   11/27/2024 Noon    docusate sodium (COLACE) 100 MG capsule Take 1 capsule (100 mg total) by mouth 2 (two) times daily. 60 capsule 1 Past Month    doxycycline (VIBRAMYCIN) 100 MG Cap Take 100 mg by mouth every 12 (twelve) hours.   11/27/2024    oxyCODONE-acetaminophen (PERCOCET)  mg per tablet Take 1 tablet by mouth every 4 (four) hours as needed for Pain. 12 tablet 0 11/27/2024 Morning    pregabalin (LYRICA) 200 MG Cap Take 1 capsule (200 mg total) by mouth 3 (three) times daily. 90 capsule 0 11/27/2024 Morning    levETIRAcetam (KEPPRA) 500 MG Tab Take 1 tablet (500 mg total) by mouth 2 (two) times daily. (Patient not taking: Reported on 11/27/2024) 180 tablet 1 Not Taking       Inpatient Medications:   doxycycline  100 mg Oral Q12H    meropenem IV (PEDS and ADULTS)  1 g Intravenous Q12H    mupirocin   Nasal BID    pantoprazole  40 mg Intravenous BID    pregabalin  100 mg Oral TID       Current Facility-Administered Medications:     0.9%  NaCl infusion (for blood administration), , Intravenous, Q24H PRN    0.9%  NaCl infusion (for blood administration), , Intravenous, Q24H PRN    acetaminophen, 650 mg, Oral, Q4H PRN    aluminum-magnesium hydroxide-simethicone, 30 mL, Oral, QID PRN    melatonin, 6 mg, Oral, Nightly PRN    naloxone, 0.02 mg, Intravenous, PRN    ondansetron, 4 mg, Intravenous, Q6H PRN    oxyCODONE-acetaminophen, 1 tablet, Oral, Q6H PRN    sodium chloride 0.9%, 10 mL, Intravenous, Q12H PRN    Review of patient's allergies indicates:  No Known Allergies    Social History:   Social History     Occupational History    Not on file   Tobacco Use    Smoking status: Never    Smokeless tobacco: Never   Substance and Sexual Activity  "   Alcohol use: No    Drug use: No    Sexual activity: Not Currently       Family History:   Family History   Problem Relation Name Age of Onset    Diabetes Mother      Hypertension Mother      Irritable bowel syndrome Mother      Diabetes Father          insulin dependent    Hypertension Father      Heart disease Father      Coronary artery disease Father      Depression Father      Diabetes Sister      Diabetes Brother      COPD Brother         Review of Systems:  A 10 point review of systems was performed and was normal, except as mentioned in the HPI, including constitutional, HEENT, heme, lymph, cardiovascular, respiratory, gastrointestinal, genitourinary, neurologic, endocrine, psychiatric and musculoskeletal.      OBJECTIVE:    Physical Exam:  24 Hour Vital Sign Ranges: Temp:  [97.8 °F (36.6 °C)-99.7 °F (37.6 °C)] 97.8 °F (36.6 °C)  Pulse:  [76-87] 83  Resp:  [16-19] 18  SpO2:  [94 %-99 %] 97 %  BP: (102-146)/(58-81) 146/78  Most recent vitals: BP (!) 146/78   Pulse 83   Temp 97.8 °F (36.6 °C) (Oral)   Resp 18   Ht 5' 6" (1.676 m)   Wt 57.4 kg (126 lb 8.7 oz)   SpO2 97%   BMI 20.42 kg/m²    GEN: well-developed, well-nourished, awake and alert, non-toxic appearing adult  HEENT: PERRL, sclera anicteric, oral mucosa pink and moist without lesion  NECK: trachea midline; Good ROM  CV: regular rate and rhythm, no murmurs or gallops  RESP: clear to auscultation bilaterally, no wheezes, rhonci or rales  ABD: soft, non-tender, non-distended, hypoactive bowel sounds without high pitched  EXT: no swelling or edema, 2+ pulses distally  SKIN: no rashes or jaundice  PSYCH: normal affect    Labs:   Recent Labs     11/28/24  0619 11/28/24  1256 11/29/24  0709   WBC 7.78 8.15 7.10   MCV 80* 80* 79*    430 446     Recent Labs     11/27/24 2026 11/28/24  0619 11/29/24  0709   * 133* 136   K 4.5 4.0 4.4   CL 96 103 106   CO2 26 24 25   BUN 40* 34* 18   GLU 97 85 88     No results for input(s): "ALB" in the " "last 72 hours.    Invalid input(s): "ALKP", "SGOT", "SGPT", "TBIL", "DBIL", "TPRO"  No results for input(s): "PT", "INR", "PTT" in the last 72 hours.      Radiology Review:  CT Abdomen Pelvis  Without Contrast   Final Result   Abnormal      Circumferential wall thickening along the colon more prominent involving the right colon may relate to colitis, with progression of wall thickening along the right colon compared to the prior examination.      There is moderate prominence of the right colon with mild-to-moderate prominence of the transverse colon and relative transition of diminished caliber at the junction of the splenic flexure and descending colon, this may be transient however if the patient has not had recent colon screening exam I would recommend follow-up colonoscopy or fluoroscopic barium exam.      Mild free fluid of the abdomen and pelvis.      Pulmonary nodules at the lung bases appears stable compared to the study of September 12, 2019.      Moderate to large hiatal hernia.      Additional findings as above.      This report was flagged in Epic as abnormal.         Electronically signed by: Bill Valadez   Date:    11/27/2024   Time:    21:52      X-Ray Chest AP Portable   Final Result      No significant detrimental interval change compared to prior examination.         Electronically signed by: Ger Modi MD   Date:    11/27/2024   Time:    21:03            IMPRESSION / RECOMMENDATIONS:  79 y.o. female with PMHx persistent hiatal hernia despite past repair, diverticulosis, cholecystectomy, DJD, remote seizures, chronic opioid use, back surgery, anemia presents for evaluation of constipation and mild abd cramping w/ non-resolving colitis and mild R sided dilation. No signs of obstruction (no n/v, sig distension or high pitched BS) and just had 2 BM. Will attempt more prep today +/- enemas in AM w/ plans for colon tomorrow. RIsks, benefits, alternatives discussed in detail regarding upcoming " procedures and sedation and possible complications. Some of the more common endoscopic complications include but not limited to immediate or delayed perforation, bleeding, infections, pain, inadvertent injury to surrounding tissue / organs and possible need for surgical evaluation. All questions answered and will proceed with procedure as planned.     -Colonoscopy tomorrow.     Thank you for this consult.    Marcio Nguyễn III  11/29/2024  12:10 PM

## 2024-11-29 NOTE — PROGRESS NOTES
[facility] Medicine  Progress Note    Patient Name: Froilan Ray  MRN: [id number]  Patient Class: IP- Inpatient   Admission Date: 11/27/2024  Length of Stay: 1 days  Attending Physician: Laverne Galicia DO  Primary Care Provider: Alphonse Boothe III, MD        Subjective:     Principal Problem:Symptomatic anemia        HPI:  72-year-old female presented to ED for eval of abdominal pain.  Patient reported she continues to have lower abdominal pain abdominal pain s/p cholecystitis and refractory colitis.  Reported associated constipation and hypotension, last BM 3 days ago, patient is on opiates regularly for chronic pain.  Patient is on antibiotics outpatient, seen in ED 2 days ago for same abdominal pain and dysuria, UA without infection and CT abdomen with persistent colitis, placed on Augmentin and discharged from ED. Patient reports compliance with Augmentin. Patient is also on chronic prophylactic doxycycline for now 2/2 history MRSA bacteremia s/p pain injection and subsequent necrotic hip replacement.  Patient was admitted to this facility at the end of September with similar symptoms, evaluated by GI, MRCP 9/26 with dilated common hepatic and common bile ducts, with truncated appearance of the distal common bile duct at the level of the ampulla and a possible duodenal mass, patient taken for EGD and EUS 9/27 showed erosive gastritis and multiple shallow ulcerations in duodenal bulb without any mass seen, GI recs follow up outpatient for colonoscopy, and consider ERCP only if pain w/ LFTs elevated as biliary dilation has been present since EUS on '20. Since discharge, patient has not been well enough to follow-up with GI for recommended colonoscopy.  Patient does also have history of bleeding ulcer.    In ED today, patient initially hypotensive, improved with IVF.  Patient noted with symptomatic anemia, hemoglobin/hematocrit 6.4/21.6, baseline appears to be between 8 and 9.  PRBCs  ordered in ED, transfusing.  Has PEDRO, creatinine 1.9, baseline appears to be 0.6-0.7.  Also with hyponatremia, Na 130.  CT abdomen pelvis impression with circumferential wall thickening along the colon more prominent involving the right colon may relate to colitis, with progression of wall thickening along the right colon compared to the prior examination; moderate prominence of the right colon with mild-to-moderate prominence of the transverse colon and relative transition of diminished caliber at the junction of the splenic flexure and descending colon, this may be transient however if the patient has not had recent colon screening exam I would recommend follow-up colonoscopy or fluoroscopic barium exam; moderate to large hiatal hernia.  Admit to hospital medicine for further eval.    Overview/Hospital Course:  No notes on file    Interval History:  Patient was seen and examined.  Overnight notes reviewed.  Patient reports continued intermittent left lower quadrant pain.  GI following.  Patient reported with 2 bowel movements after prep and GI was notified.  Plan for colonoscopy tomorrow with GI.  Continue IV antibiotics for now.  Blood cultures in process with no growth to date.  H&H stable on a.m. labs post transfusion.  Patient reports improvement in lightheadedness after blood transfusion.    Review of Systems   Respiratory:  Negative for shortness of breath.    Cardiovascular:  Negative for chest pain.   Gastrointestinal:  Positive for abdominal pain. Negative for nausea and vomiting.     Objective:     Vital Signs (Most Recent):  Temp: 97.8 °F (36.6 °C) (11/29/24 1132)  Pulse: 83 (11/29/24 1132)  Resp: 18 (11/29/24 0719)  BP: (!) 146/78 (11/29/24 1132)  SpO2: 97 % (11/29/24 1132) Vital Signs (24h Range):  Temp:  [97.8 °F (36.6 °C)-99.7 °F (37.6 °C)] 97.8 °F (36.6 °C)  Pulse:  [76-87] 83  Resp:  [16-19] 18  SpO2:  [94 %-99 %] 97 %  BP: (102-146)/(58-81) 146/78     Weight: 57.4 kg (126 lb 8.7 oz)  Body mass  index is 20.42 kg/m².    Intake/Output Summary (Last 24 hours) at 11/29/2024 1529  Last data filed at 11/29/2024 1508  Gross per 24 hour   Intake 373.33 ml   Output 600 ml   Net -226.67 ml         Physical Exam  Vitals and nursing note reviewed.   Constitutional:       General: She is not in acute distress.     Appearance: Normal appearance.   HENT:      Head: Normocephalic and atraumatic.      Right Ear: External ear normal.      Left Ear: External ear normal.      Nose: Nose normal.      Mouth/Throat:      Mouth: Mucous membranes are moist.      Pharynx: Oropharynx is clear.   Eyes:      Extraocular Movements: Extraocular movements intact.   Cardiovascular:      Rate and Rhythm: Normal rate and regular rhythm.      Pulses: Normal pulses.      Heart sounds: Normal heart sounds.   Pulmonary:      Effort: Pulmonary effort is normal. No accessory muscle usage.      Breath sounds: Normal breath sounds.   Abdominal:      General: Abdomen is flat. Bowel sounds are decreased. There is no distension.      Palpations: Abdomen is soft.      Tenderness: There is abdominal tenderness in the left lower quadrant. There is no guarding or rebound.   Musculoskeletal:         General: Normal range of motion.      Cervical back: Normal range of motion and neck supple.   Skin:     General: Skin is warm and dry.      Capillary Refill: Capillary refill takes less than 2 seconds.      Coloration: Skin is pale.   Neurological:      General: No focal deficit present.      Mental Status: She is alert and oriented to person, place, and time. Mental status is at baseline.   Psychiatric:         Mood and Affect: Mood normal.         Behavior: Behavior normal.             Significant Labs: All pertinent labs within the past 24 hours have been reviewed.  CBC:   Recent Labs   Lab 11/28/24  0619 11/28/24  1256 11/29/24  0709   WBC 7.78 8.15 7.10   HGB 7.4* 7.2* 8.7*   HCT 25.1* 24.6* 28.4*    430 446     CMP:   Recent Labs   Lab  11/27/24 2026 11/28/24  0619 11/29/24  0709   * 133* 136   K 4.5 4.0 4.4   CL 96 103 106   CO2 26 24 25   GLU 97 85 88   BUN 40* 34* 18   CREATININE 1.9* 1.1 0.6   CALCIUM 9.0 8.2* 8.3*   PROT 6.4 5.8* 5.4*   ALBUMIN 3.5 3.1* 2.9*   BILITOT 1.1* 1.5* 1.4*   ALKPHOS 694* 735* 857*   AST 16 15 9*   ALT 11 10 7*   ANIONGAP 8 6* 5*       Significant Imaging: I have reviewed all pertinent imaging results/findings within the past 24 hours.    Assessment/Plan:      * Symptomatic anemia  Anemia is likely due to  may be multifactorial, work-up in progress. Chronic disease, hx bleeding ulcer  . Most recent hemoglobin and hematocrit are listed below.  Recent Labs     11/28/24 0619 11/28/24  1256 11/29/24  0709   HGB 7.4* 7.2* 8.7*   HCT 25.1* 24.6* 28.4*       Plan  - Monitor serial CBC: Daily  - Transfuse PRBC if patient becomes hemodynamically unstable, symptomatic or H/H drops below 7/21.  - Patient has received 1 units of PRBCs on 11/27/24  and 1 unit PRBC on 11/18  - Patient's anemia is currently stable  - Occult stool  -Retic count, iron studies  -Protonix BID  -Hold home BP med, reduce pain med dosing given hypotension  -Avoid thinners  -Consult GI    Hyponatremia  Hyponatremia is likely due to Dehydration/hypovolemia. The patient's most recent sodium results are listed below.  Recent Labs     11/27/24 2026 11/28/24 0619 11/29/24  0709   * 133* 136       Plan  - Correct the sodium by 4-6mEq in 24 hours.   - Obtain the following studies: Urine sodium, urine osmolality, serum osmolality or TSH, T4.  - Will treat the hyponatremia with IV fluids as follows: Gentle IVF overnight x 500ml and Fluid restriction of:  1.5 liter per day  - Monitor sodium Daily.   - Patient hyponatremia is stable      PEDRO (acute kidney injury)  PEDRO is likely due to pre-renal azotemia due to intravascular volume depletion. Baseline creatinine is  0.6-0.7 . Most recent creatinine and eGFR are listed below.  Recent Labs      11/27/24 2026 11/28/24  0619 11/29/24  0709   CREATININE 1.9* 1.1 0.6   EGFRNORACEVR 26.9* 51.8* >60.0        Plan  - PEDRO is improving  - Avoid nephrotoxins and renally dose meds for GFR listed above  - Monitor urine output, serial CMP, and adjust therapy as needed  - Gentle IVF overnight x 500ml  -Hold home ACEI  -S/p 2 units PRBCs    History of bacteremia  Hx MRSA bacteremia  Blood cx's, procalcitonin, daily CBC  UA  On long term prophylactic doxycycline, continue      Disease of biliary tract  Per dc instructions September 2023, per GI recs consider ERCP only if pain w/LFTs elevated as biliary dilation has been present since EUS on '20   Lipase      Colitis  Refractory, recurrent  Has not been well enough to follow up for recommended outpatient colonoscopy  Blood cx's in process with NGTD  Procalcitonin elevated  No leukocytosis on a.m. labs  Stool studies  Hold home augmentin  Start merrem  I&O  GI following with plan for colonoscopy tomorrow given insufficient response to prep today      VTE Risk Mitigation (From admission, onward)           Ordered     Reason for No Pharmacological VTE Prophylaxis  Once        Question:  Reasons:  Answer:  Active Bleeding    11/27/24 2334     IP VTE HIGH RISK PATIENT  Once         11/27/24 2334     Place sequential compression device  Until discontinued         11/27/24 2334                    Discharge Planning   BAILEY: 11/30/2024     Code Status: Full Code   Is the patient medically ready for discharge?:     Reason for patient still in hospital (select all that apply): Patient trending condition, Laboratory test, Treatment, Imaging, Consult recommendations, and Pending disposition  Discharge Plan A: Home with family                  Chelsea Bose PA-C  Department of Hospital Medicine   [facility]

## 2024-11-29 NOTE — ASSESSMENT & PLAN NOTE
Anemia is likely due to  may be multifactorial, work-up in progress. Chronic disease, hx bleeding ulcer  . Most recent hemoglobin and hematocrit are listed below.  Recent Labs     11/28/24  0619 11/28/24  1256 11/29/24  0709   HGB 7.4* 7.2* 8.7*   HCT 25.1* 24.6* 28.4*       Plan  - Monitor serial CBC: Daily  - Transfuse PRBC if patient becomes hemodynamically unstable, symptomatic or H/H drops below 7/21.  - Patient has received 1 units of PRBCs on 11/27/24  and 1 unit PRBC on 11/18  - Patient's anemia is currently stable  - Occult stool  -Retic count, iron studies  -Protonix BID  -Hold home BP med, reduce pain med dosing given hypotension  -Avoid thinners  -Consult GI

## 2024-11-29 NOTE — PLAN OF CARE
Spoke with Otoniel Ray (Spouse)- [phone], regarding discharge IMM, Understands statement  and IMM will be scanned to chart.

## 2024-11-29 NOTE — PLAN OF CARE
Did not schedule with DC clinic per smart scheduling d/t provider wanting to see his own pts for follow up apt. PCP apt scheduled for next week and added to AVS       11/29/24 6840   Post-Acute Status   Hospital Resources/Appts/Education Provided Appointments scheduled and added to AVS

## 2024-11-29 NOTE — ASSESSMENT & PLAN NOTE
Hyponatremia is likely due to Dehydration/hypovolemia. The patient's most recent sodium results are listed below.  Recent Labs     11/27/24 2026 11/28/24  0619 11/29/24  0709   * 133* 136       Plan  - Correct the sodium by 4-6mEq in 24 hours.   - Obtain the following studies: Urine sodium, urine osmolality, serum osmolality or TSH, T4.  - Will treat the hyponatremia with IV fluids as follows: Gentle IVF overnight x 500ml and Fluid restriction of:  1.5 liter per day  - Monitor sodium Daily.   - Patient hyponatremia is stable

## 2024-11-29 NOTE — PROGRESS NOTES
Pharmacist Renal Dose Adjustment Note    Froilan Ray is a 79 y.o. female being treated with the medication meropenem    Patient Data:    Vital Signs (Most Recent):  Temp: 98.2 °F (36.8 °C) (11/28/24 1951)  Pulse: 82 (11/28/24 1951)  Resp: 18 (11/28/24 1951)  BP: 118/72 (11/28/24 1951)  SpO2: 99 % (11/28/24 1951) Vital Signs (72h Range):  Temp:  [97.3 °F (36.3 °C)-99.7 °F (37.6 °C)]   Pulse:  [79-93]   Resp:  [14-31]   BP: ()/(36-90)   SpO2:  [92 %-100 %]      Recent Labs   Lab 11/25/24  1336 11/27/24 2026 11/28/24 0619   CREATININE 0.9 1.9* 1.1     Serum creatinine: 1.1 mg/dL 11/28/24 0619  Estimated creatinine clearance: 38.8 mL/min    Medication:meropenem dose: 500 mg frequency q12h will be changed to medication:meropenem dose:1000 mg frequency:q12h  Cue to CrCl > 25 mL/min    Pharmacist's Name: Eulalio Hernandez  Pharmacist's Extension: [phone]

## 2024-11-29 NOTE — ASSESSMENT & PLAN NOTE
PEDRO is likely due to pre-renal azotemia due to intravascular volume depletion. Baseline creatinine is  0.6-0.7 . Most recent creatinine and eGFR are listed below.  Recent Labs     11/27/24 2026 11/28/24  0619 11/29/24  0709   CREATININE 1.9* 1.1 0.6   EGFRNORACEVR 26.9* 51.8* >60.0        Plan  - PEDRO is improving  - Avoid nephrotoxins and renally dose meds for GFR listed above  - Monitor urine output, serial CMP, and adjust therapy as needed  - Gentle IVF overnight x 500ml  -Hold home ACEI  -S/p 2 units PRBCs

## 2024-11-29 NOTE — PT/OT/SLP PROGRESS
Physical Therapy      Patient Name:  Froilan Ray   MRN:  [id number]    Patient not seen today secondary to Other (Comment) (1st attempt pt confused and pulling on IV, called for RN to check it & charge came to re-dress it. On 2nd attempt pt sound asleep due to meds for EGD with  present(waiting to go any minute). Will attempt again in am.). Will follow-up 11/30/24.

## 2024-11-30 VITALS
TEMPERATURE: 98 F | BODY MASS INDEX: 19.03 KG/M2 | SYSTOLIC BLOOD PRESSURE: 133 MMHG | HEART RATE: 82 BPM | WEIGHT: 118.38 LBS | OXYGEN SATURATION: 99 % | RESPIRATION RATE: 18 BRPM | HEIGHT: 66 IN | DIASTOLIC BLOOD PRESSURE: 78 MMHG

## 2024-11-30 LAB
ALBUMIN SERPL BCP-MCNC: 3 G/DL (ref 3.5–5.2)
ALP SERPL-CCNC: 1068 U/L (ref 55–135)
ALT SERPL W/O P-5'-P-CCNC: 6 U/L (ref 10–44)
ANION GAP SERPL CALC-SCNC: 7 MMOL/L (ref 8–16)
AST SERPL-CCNC: 10 U/L (ref 10–40)
BASOPHILS # BLD AUTO: 0.04 K/UL (ref 0–0.2)
BASOPHILS NFR BLD: 0.7 % (ref 0–1.9)
BILIRUB SERPL-MCNC: 1.3 MG/DL (ref 0.1–1)
BUN SERPL-MCNC: 14 MG/DL (ref 8–23)
CALCIUM SERPL-MCNC: 8.2 MG/DL (ref 8.7–10.5)
CHLORIDE SERPL-SCNC: 106 MMOL/L (ref 95–110)
CO2 SERPL-SCNC: 24 MMOL/L (ref 23–29)
CREAT SERPL-MCNC: 0.5 MG/DL (ref 0.5–1.4)
DIFFERENTIAL METHOD BLD: ABNORMAL
EOSINOPHIL # BLD AUTO: 0.2 K/UL (ref 0–0.5)
EOSINOPHIL NFR BLD: 3.7 % (ref 0–8)
ERYTHROCYTE [DISTWIDTH] IN BLOOD BY AUTOMATED COUNT: 15.9 % (ref 11.5–14.5)
EST. GFR  (NO RACE VARIABLE): >60 ML/MIN/1.73 M^2
GLUCOSE SERPL-MCNC: 78 MG/DL (ref 70–110)
HCT VFR BLD AUTO: 30.1 % (ref 37–48.5)
HGB BLD-MCNC: 9 G/DL (ref 12–16)
IMM GRANULOCYTES # BLD AUTO: 0.03 K/UL (ref 0–0.04)
IMM GRANULOCYTES NFR BLD AUTO: 0.5 % (ref 0–0.5)
LYMPHOCYTES # BLD AUTO: 1.5 K/UL (ref 1–4.8)
LYMPHOCYTES NFR BLD: 27.3 % (ref 18–48)
MAGNESIUM SERPL-MCNC: 2.4 MG/DL (ref 1.6–2.6)
MCH RBC QN AUTO: 24 PG (ref 27–31)
MCHC RBC AUTO-ENTMCNC: 29.9 G/DL (ref 32–36)
MCV RBC AUTO: 80 FL (ref 82–98)
MONOCYTES # BLD AUTO: 0.5 K/UL (ref 0.3–1)
MONOCYTES NFR BLD: 8.5 % (ref 4–15)
NEUTROPHILS # BLD AUTO: 3.4 K/UL (ref 1.8–7.7)
NEUTROPHILS NFR BLD: 59.3 % (ref 38–73)
NRBC BLD-RTO: 0 /100 WBC
PLATELET # BLD AUTO: 491 K/UL (ref 150–450)
PMV BLD AUTO: 9.5 FL (ref 9.2–12.9)
POTASSIUM SERPL-SCNC: 3.5 MMOL/L (ref 3.5–5.1)
PROT SERPL-MCNC: 5.5 G/DL (ref 6–8.4)
RBC # BLD AUTO: 3.75 M/UL (ref 4–5.4)
SODIUM SERPL-SCNC: 137 MMOL/L (ref 136–145)
WBC # BLD AUTO: 5.65 K/UL (ref 3.9–12.7)

## 2024-11-30 PROCEDURE — 63600175 PHARM REV CODE 636 W HCPCS

## 2024-11-30 PROCEDURE — 99900031 HC PATIENT EDUCATION (STAT)

## 2024-11-30 PROCEDURE — 94761 N-INVAS EAR/PLS OXIMETRY MLT: CPT

## 2024-11-30 PROCEDURE — 36415 COLL VENOUS BLD VENIPUNCTURE: CPT

## 2024-11-30 PROCEDURE — 25000003 PHARM REV CODE 250

## 2024-11-30 PROCEDURE — 97161 PT EVAL LOW COMPLEX 20 MIN: CPT

## 2024-11-30 PROCEDURE — 0DBM8ZX EXCISION OF DESCENDING COLON, VIA NATURAL OR ARTIFICIAL OPENING ENDOSCOPIC, DIAGNOSTIC: ICD-10-PCS | Performed by: INTERNAL MEDICINE

## 2024-11-30 PROCEDURE — 0DBL8ZX EXCISION OF TRANSVERSE COLON, VIA NATURAL OR ARTIFICIAL OPENING ENDOSCOPIC, DIAGNOSTIC: ICD-10-PCS | Performed by: INTERNAL MEDICINE

## 2024-11-30 PROCEDURE — 63600175 PHARM REV CODE 636 W HCPCS: Mod: JZ,JG

## 2024-11-30 PROCEDURE — 83735 ASSAY OF MAGNESIUM: CPT

## 2024-11-30 PROCEDURE — 63600175 PHARM REV CODE 636 W HCPCS: Performed by: FAMILY MEDICINE

## 2024-11-30 PROCEDURE — D9220A PRA ANESTHESIA: Mod: ANES,,, | Performed by: STUDENT IN AN ORGANIZED HEALTH CARE EDUCATION/TRAINING PROGRAM

## 2024-11-30 PROCEDURE — 80053 COMPREHEN METABOLIC PANEL: CPT

## 2024-11-30 PROCEDURE — 99222 1ST HOSP IP/OBS MODERATE 55: CPT | Mod: 24,,, | Performed by: SURGERY

## 2024-11-30 PROCEDURE — D9220A PRA ANESTHESIA: Mod: CRNA,,,

## 2024-11-30 PROCEDURE — 25000003 PHARM REV CODE 250: Performed by: FAMILY MEDICINE

## 2024-11-30 PROCEDURE — 27200043 HC FORCEPS, BIOPSY: Performed by: INTERNAL MEDICINE

## 2024-11-30 PROCEDURE — 88305 TISSUE EXAM BY PATHOLOGIST: CPT | Mod: TC | Performed by: PATHOLOGY

## 2024-11-30 PROCEDURE — 45380 COLONOSCOPY AND BIOPSY: CPT | Performed by: INTERNAL MEDICINE

## 2024-11-30 PROCEDURE — 37000009 HC ANESTHESIA EA ADD 15 MINS: Performed by: INTERNAL MEDICINE

## 2024-11-30 PROCEDURE — 85025 COMPLETE CBC W/AUTO DIFF WBC: CPT

## 2024-11-30 PROCEDURE — 37000008 HC ANESTHESIA 1ST 15 MINUTES: Performed by: INTERNAL MEDICINE

## 2024-11-30 RX ORDER — MEROPENEM 1 G/1
1 INJECTION, POWDER, FOR SOLUTION INTRAVENOUS
Status: DISCONTINUED | OUTPATIENT
Start: 2024-11-30 | End: 2024-11-30

## 2024-11-30 RX ORDER — LIDOCAINE HYDROCHLORIDE 20 MG/ML
INJECTION, SOLUTION EPIDURAL; INFILTRATION; INTRACAUDAL; PERINEURAL
Status: DISCONTINUED | OUTPATIENT
Start: 2024-11-30 | End: 2024-11-30

## 2024-11-30 RX ORDER — LANOLIN ALCOHOL/MO/W.PET/CERES
1 CREAM (GRAM) TOPICAL DAILY
Status: DISCONTINUED | OUTPATIENT
Start: 2024-11-30 | End: 2024-11-30 | Stop reason: HOSPADM

## 2024-11-30 RX ORDER — METRONIDAZOLE 500 MG/1
500 TABLET ORAL EVERY 8 HOURS
Qty: 21 TABLET | Refills: 0 | Status: SHIPPED | OUTPATIENT
Start: 2024-11-30 | End: 2024-12-07

## 2024-11-30 RX ORDER — LANOLIN ALCOHOL/MO/W.PET/CERES
800 CREAM (GRAM) TOPICAL
Status: DISCONTINUED | OUTPATIENT
Start: 2024-11-30 | End: 2024-11-30 | Stop reason: HOSPADM

## 2024-11-30 RX ORDER — PROPOFOL 10 MG/ML
VIAL (ML) INTRAVENOUS
Status: DISCONTINUED | OUTPATIENT
Start: 2024-11-30 | End: 2024-11-30

## 2024-11-30 RX ORDER — CEFUROXIME AXETIL 500 MG/1
500 TABLET ORAL 2 TIMES DAILY
Qty: 14 TABLET | Refills: 0 | Status: SHIPPED | OUTPATIENT
Start: 2024-11-30 | End: 2024-12-07

## 2024-11-30 RX ORDER — METRONIDAZOLE 500 MG/100ML
500 INJECTION, SOLUTION INTRAVENOUS
Status: DISCONTINUED | OUTPATIENT
Start: 2024-11-30 | End: 2024-11-30 | Stop reason: HOSPADM

## 2024-11-30 RX ORDER — CEFTRIAXONE 2 G/1
2 INJECTION, POWDER, FOR SOLUTION INTRAMUSCULAR; INTRAVENOUS
Status: DISCONTINUED | OUTPATIENT
Start: 2024-11-30 | End: 2024-11-30 | Stop reason: HOSPADM

## 2024-11-30 RX ADMIN — OXYCODONE HYDROCHLORIDE AND ACETAMINOPHEN 1 TABLET: 10; 325 TABLET ORAL at 03:11

## 2024-11-30 RX ADMIN — CEFTRIAXONE 2 G: 2 INJECTION, POWDER, FOR SOLUTION INTRAMUSCULAR; INTRAVENOUS at 10:11

## 2024-11-30 RX ADMIN — PROPOFOL 30 MG: 10 INJECTION, EMULSION INTRAVENOUS at 11:11

## 2024-11-30 RX ADMIN — PROPOFOL 100 MG: 10 INJECTION, EMULSION INTRAVENOUS at 11:11

## 2024-11-30 RX ADMIN — DOXYCYCLINE HYCLATE 100 MG: 100 CAPSULE ORAL at 01:11

## 2024-11-30 RX ADMIN — METRONIDAZOLE 500 MG: 500 INJECTION, SOLUTION INTRAVENOUS at 10:11

## 2024-11-30 RX ADMIN — PREGABALIN 100 MG: 75 CAPSULE ORAL at 03:11

## 2024-11-30 RX ADMIN — POTASSIUM BICARBONATE 50 MEQ: 978 TABLET, EFFERVESCENT ORAL at 01:11

## 2024-11-30 RX ADMIN — LIDOCAINE HYDROCHLORIDE 50 MG: 20 INJECTION, SOLUTION INTRAVENOUS at 11:11

## 2024-11-30 RX ADMIN — MUPIROCIN 1 G: 20 OINTMENT TOPICAL at 08:11

## 2024-11-30 RX ADMIN — PANTOPRAZOLE SODIUM 40 MG: 40 INJECTION, POWDER, FOR SOLUTION INTRAVENOUS at 08:11

## 2024-11-30 RX ADMIN — MEROPENEM 1 G: 1 INJECTION INTRAVENOUS at 01:11

## 2024-11-30 RX ADMIN — PROPOFOL 40 MG: 10 INJECTION, EMULSION INTRAVENOUS at 11:11

## 2024-11-30 NOTE — PT/OT/SLP EVAL
Physical Therapy Evaluation    Patient Name:  Froilan Ray   MRN:  [id number]    Recommendations:     Discharge Recommendations: Low Intensity Therapy   Discharge Equipment Recommendations: none   Barriers to discharge: None    Assessment:     Froilan Ray is a 79 y.o. female admitted with a medical diagnosis of Symptomatic anemia.  She presents with the following impairments/functional limitations: weakness, impaired endurance, impaired functional mobility, gait instability, impaired balance, decreased lower extremity function, decreased ROM, impaired cardiopulmonary response to activity .  Patient agreeable to PT evaluation but stated she was just medicated with a sedative to get her ready for her procedure today.  Patient presented supine in bed and was able to transfer to sitting EOB with SBA and stood with cGA and a RW.  It was noted, patient was soiled when standing so returned to be and nurse informed.    Rehab Prognosis: Good; patient would benefit from acute skilled PT services to address these deficits and reach maximum level of function.    Recent Surgery: Procedure(s) (LRB):  EGD (ESOPHAGOGASTRODUODENOSCOPY) (N/A)  COLONOSCOPY (N/A)      Plan:     During this hospitalization, patient to be seen 6 x/week to address the identified rehab impairments via gait training, therapeutic activities, therapeutic exercises and progress toward the following goals:    Plan of Care Expires:  12/30/24    Subjective     Chief Complaint: fatigue  Patient/Family Comments/goals: get stronger  Pain/Comfort:       Patients cultural, spiritual, Baptism conflicts given the current situation:      Living Environment:  Currently lives with spouse in [address] home.  Prior to admission, patients level of function was modified independent. Equipment used at home: walker, rolling.  DME owned (not currently used): none.  Upon discharge, patient will have assistance from spouse.    Objective:     Communicated with nurse  prior to session.  Patient found supine with bed alarm, telemetry  upon PT entry to room.    General Precautions: Standard, fall  Orthopedic Precautions:N/A   Braces:    Respiratory Status: Room air    Exams:  RLE ROM: WFL  RLE Strength: WNL  LLE ROM: WFL  LLE Strength: WNL    Functional Mobility:  Bed Mobility:     Supine to Sit: stand by assistance  Sit to Supine: stand by assistance  Transfers:     Sit to Stand:  contact guard assistance with rolling walker      AM-PAC 6 CLICK MOBILITY  Total Score:18       Treatment & Education:  None given    Patient left supine with call button in reach, bed alarm on, and nurse notified.    GOALS:   Multidisciplinary Problems       Physical Therapy Goals          Problem: Physical Therapy    Goal Priority Disciplines Outcome Interventions   Physical Therapy Goal     PT, PT/OT Progressing    Description: Goals to be met by: 24     Patient will increase functional independence with mobility by performin. Supine to sit with [place]  2. Sit to supine with [place]  3. Sit to stand transfer with Stand-by Assistance  4. Bed to chair transfer with Stand-by Assistance using Rolling Walker  5. Gait  x 150 feet with Stand-by Assistance using Rolling Walker.                          History:     Past Medical History:   Diagnosis Date    Anemia     Arthritis     Bleeding ulcer 2016    Chronic pain     DDD (degenerative disc disease), cervical     DDD (degenerative disc disease), lumbar     Depression     Disc degeneration, lumbosacral     Diverticulitis     Encounter for blood transfusion     Hypertension     IBS (irritable bowel syndrome)     Neuropathy of both feet     Seizures     none  since     Umbilical hernia        Past Surgical History:   Procedure Laterality Date    ARTHROPLASTY, HIP, GIRDLESTONE, POSTERIOR APPROACH Left 2024    Procedure: ARTHROPLASTY, HIP, GIRDLESTONE, POSTERIOR APPROACH;  Surgeon: Bulmaro Tellez MD;  Location: [facility] OR  2ND FLR;  Service: Orthopedics;  Laterality: Left;    ARTHROPLASTY, HIP, GIRDLESTONE, POSTERIOR APPROACH Left 1/25/2024    Procedure: Irrigation and debridement left hip,;  Surgeon: Juanito Painting MD;  Location: [facility] OR 2ND FLR;  Service: Orthopedics;  Laterality: Left;    ARTHROPLASTY, HIP, GIRDLESTONE, POSTERIOR APPROACH Left 2/15/2024    Procedure: Revision hip antibiotic spacer head exchange, left, lateral, peg board, depuy osteomed in room, [facility], ancef, txa,;  Surgeon: Bulmaro Tellez MD;  Location: [facility] OR 2ND FLR;  Service: Orthopedics;  Laterality: Left;    BACK SURGERY      BREAST BIOPSY      BREAST SURGERY Right     lumpectomy    CAUDAL EPIDURAL STEROID INJECTION N/A 01/28/2022    Procedure: Injection-steroid-epidural-caudal;  Surgeon: Luzmaria Marcelino MD;  Location: [facility] OR;  Service: Pain Management;  Laterality: N/A;    COLONOSCOPY N/A 01/14/2022    Procedure: COLONOSCOPY;  Surgeon: Abby Landers MD;  Location: [facility];  Service: Endoscopy;  Laterality: N/A;    ENDOSCOPIC ULTRASOUND OF UPPER GASTROINTESTINAL TRACT N/A 07/21/2020    Procedure: ULTRASOUND, UPPER GI TRACT, ENDOSCOPIC;  Surgeon: Marcio Nguyễn III, MD;  Location: [facility] ENDO;  Service: Endoscopy;  Laterality: N/A;    ENDOSCOPIC ULTRASOUND OF UPPER GASTROINTESTINAL TRACT N/A 9/27/2024    Procedure: ULTRASOUND, UPPER GI TRACT, ENDOSCOPIC;  Surgeon: Marcio Nguyễn III, MD;  Location: [facility];  Service: Endoscopy;  Laterality: N/A;    ESOPHAGOGASTRODUODENOSCOPY N/A 01/14/2022    Procedure: EGD (ESOPHAGOGASTRODUODENOSCOPY);  Surgeon: Abby Landers MD;  Location: [facility] ENDO;  Service: Endoscopy;  Laterality: N/A;    ESOPHAGOGASTRODUODENOSCOPY N/A 06/10/2022    Procedure: EGD (ESOPHAGOGASTRODUODENOSCOPY);  Surgeon: Abby Landers MD;  Location: [facility] ENDO;  Service: Endoscopy;  Laterality: N/A;    EYE SURGERY      cataract    FLAP GRAFT, LOCAL Left 2/15/2024    Procedure: FLAP GRAFT, LOCAL;  Surgeon: Geoff  Bulmaro JERNIGAN MD;  Location: [facility] OR 2ND FLR;  Service: Orthopedics;  Laterality: Left;    HYSTERECTOMY      INTRAMEDULLARY RODDING OF TROCHANTER OF FEMUR Left 12/11/2018    Procedure: INSERTION, INTRAMEDULLARY SANTOS, FEMUR, TROCHANTER;  Surgeon: Eulalio De La Cruz MD;  Location: [facility] OR;  Service: Orthopedics;  Laterality: Left;    IRRIGATION AND DEBRIDEMENT OF LOWER EXTREMITY Left 1/19/2024    Procedure: IRRIGATION AND DEBRIDEMENT, LOWER EXTREMITY;  Surgeon: Bulmaro Tellez MD;  Location: [facility] OR 2ND FLR;  Service: Orthopedics;  Laterality: Left;    IRRIGATION AND DEBRIDEMENT OF LOWER EXTREMITY Left 2/15/2024    Procedure: IRRIGATION AND DEBRIDEMENT, LOWER EXTREMITY;  Surgeon: Bulmaro Tellez MD;  Location: [facility] OR [facility] FLR;  Service: Orthopedics;  Laterality: Left;    LAPAROSCOPIC CHOLECYSTECTOMY N/A 9/8/2024    Procedure: CHOLECYSTECTOMY, LAPAROSCOPIC;  Surgeon: Cipriano Ambrosio MD;  Location: [facility];  Service: General;  Laterality: N/A;    REPAIR OF EPIGASTRIC HERNIA N/A 12/7/2023    Procedure: REPAIR, HERNIA, EPIGASTRIC;  Surgeon: Vipul Escobar MD;  Location: [facility];  Service: General;  Laterality: N/A;    SPINAL CORD STIMULATOR IMPLANT  09/18/2013    and removal    SPINE SURGERY  2006    lumbar L2-S1 decompression.    SPINE SURGERY      cervical decompression    TONSILLECTOMY         Time Tracking:     PT Received On: 11/30/24  PT Start Time: 0840     PT Stop Time: 0848  PT Total Time (min): 8 min     Billable Minutes: Evaluation 8      11/30/2024

## 2024-11-30 NOTE — NURSING
Pt. Is doing bowel prep and has developed a rectal prolapse. Pt states that this has happened to her before and she just pushes it back in. Pt. Attempted to push back in but was unsuccessful. Notified Hospitalist on call. Hospitalist stated that this was a GI issue to notify them. Notified Dr. Nguyễn of situation. M.D. stated that there were no intervention needed tonight, try to keep it moist till am. Pictures were taken for chart.

## 2024-11-30 NOTE — DISCHARGE SUMMARY
[facility] Medicine  Discharge Summary      Patient Name: Froilan Ray  MRN: [id number]  [facility]: [id number]  Patient Class: IP- Inpatient  Admission Date: 11/27/2024  Hospital Length of Stay: 2 days  Discharge Date and Time:  11/30/2024 2:53 PM  Attending Physician: Laverne Galicia DO   Discharging Provider: Chelsea Bose PA-C  Primary Care Provider: Alphonse Boothe III, MD    Primary Care Team: Networked reference to record PCT     HPI:   72-year-old female presented to ED for eval of abdominal pain.  Patient reported she continues to have lower abdominal pain abdominal pain s/p cholecystitis and refractory colitis.  Reported associated constipation and hypotension, last BM 3 days ago, patient is on opiates regularly for chronic pain.  Patient is on antibiotics outpatient, seen in ED 2 days ago for same abdominal pain and dysuria, UA without infection and CT abdomen with persistent colitis, placed on Augmentin and discharged from ED. Patient reports compliance with Augmentin. Patient is also on chronic prophylactic doxycycline for now 2/2 history MRSA bacteremia s/p pain injection and subsequent necrotic hip replacement.  Patient was admitted to this facility at the end of September with similar symptoms, evaluated by GI, MRCP 9/26 with dilated common hepatic and common bile ducts, with truncated appearance of the distal common bile duct at the level of the ampulla and a possible duodenal mass, patient taken for EGD and EUS 9/27 showed erosive gastritis and multiple shallow ulcerations in duodenal bulb without any mass seen, GI recs follow up outpatient for colonoscopy, and consider ERCP only if pain w/ LFTs elevated as biliary dilation has been present since EUS on '20. Since discharge, patient has not been well enough to follow-up with GI for recommended colonoscopy.  Patient does also have history of bleeding ulcer.    In ED today, patient initially hypotensive, improved with IVF.   Patient noted with symptomatic anemia, hemoglobin/hematocrit 6.4/21.6, baseline appears to be between 8 and 9.  PRBCs ordered in ED, transfusing.  Has PEDRO, creatinine 1.9, baseline appears to be 0.6-0.7.  Also with hyponatremia, Na 130.  CT abdomen pelvis impression with circumferential wall thickening along the colon more prominent involving the right colon may relate to colitis, with progression of wall thickening along the right colon compared to the prior examination; moderate prominence of the right colon with mild-to-moderate prominence of the transverse colon and relative transition of diminished caliber at the junction of the splenic flexure and descending colon, this may be transient however if the patient has not had recent colon screening exam I would recommend follow-up colonoscopy or fluoroscopic barium exam; moderate to large hiatal hernia.  Admit to hospital medicine for further eval.    Procedure(s) (LRB):  COLONOSCOPY (N/A)      Hospital Course:   Patient was admitted to the hospital with symptomatic anemia and lower abdominal pain. Patient was monitored closely throughout hospital stay. CT abdomen was obtained on admission was concerning for colitis. Patient was started on IV meropenem.  Blood cultures were obtained on admission and remained in process no growth to date. GI was consulted on admission. Home blood pressure medications were held given labile blood pressure. She was transfused 1 unit PRBC with improvement in H&H. Patient remained symptomatic and additional 1 unit PRBC was transfused. Patient's H&H remained stable post transfusion and she reported resolution of symptoms. Blood cultures remained with no growth to date and patient was without leukocytosis so antibiotics were de-escalated. Patient was developed rectal prolapse with colonoscopy prep. General surgery was consulted and GI was notified. Patient underwent colonoscopy with GI on 11/30 which revealed scattered small-mouthed  diverticula, a patchy area of moderately altered vascular, atrophic, eroded, granular and scattered ulcerated mucosa was found in the sigmoid colon, in the descending colon and in the transverse colon  and biopsies were obtained. Rectal prolapse was reduced during colonoscopy. Patient was seen on day of discharge. Patient was cleared for discharge by General surgery and GI.  Patient was eager for discharge.  Patient was prescribed antibiotics at discharge. Patient was not prescribed pain medications given she was under pain contract and abdominal pain resolved. Patient to follow up with PCP, GI, and General surgery. Patient was instructed to keep a daily blood pressure log and bring to future appointments. Home health was set up at discharge with CBC and CMP to be drawn weekly with results sent to PCP. Patient was instructed to take over-the-counter iron supplementation and stool softener as needed. Patient was instructed to take over-the-counter probiotics while taking antibiotics. Return precautions discussed with the patient, patient's , patient's son who all voiced understanding. All questions answered.     Goals of Care Treatment Preferences:  Code Status: Full Code          What is most important right now is to focus on symptom/pain control.  Accordingly, we have decided that the best plan to meet the patient's goals includes continuing with treatment.      SDOH Screening:  The patient was unable to be screened for utility difficulties, food insecurity, transport difficulties, housing insecurity, and interpersonal safety, so no concerns could be identified this admission.     Consults:   Consults (From admission, onward)          Status Ordering Provider     Inpatient consult to General Surgery  Once        Provider:  Mansoor Mejía Jr., MD    Completed ASIM JOE     Case Management/  Once        Provider:  (Not yet assigned)    Completed RUTHANN GARCIA     Inpatient  consult to Gastroenterology  Once        Provider:  Marcio Nguyễn III, MD    Completed RUTHANN GARCIA            No new Assessment & Plan notes have been filed under this hospital service since the last note was generated.  Service: Hospital Medicine    Final Active Diagnoses:    Diagnosis Date Noted POA    PRINCIPAL PROBLEM:  Symptomatic anemia [D64.9] 11/27/2024 Yes    PEDRO (acute kidney injury) [N17.9] 11/27/2024 Yes    Hyponatremia [E87.1] 11/27/2024 Yes    History of bacteremia [Z87.898] 01/18/2024 Yes    Disease of biliary tract [K83.9] 07/21/2020 Yes    Colitis [K52.9] 01/08/2020 Yes      Problems Resolved During this Admission:       Discharged Condition: good    Disposition: Home or Self Care    Follow Up:   Follow-up Information       Alphonse Boothe III, MD. Go on 12/6/2024.    Specialty: Family Medicine  Why: at 1:10 PM for hospital follow up  Contact information:  [address] [facility]  [address]  [place] [address]  [phone]               Marcio Nguyễn III, MD Follow up.    Specialty: Gastroenterology  Contact information:  [address] [facility]  [place] [address]  [phone]               Cipriano Ambrosio MD Follow up.    Specialties: General Surgery, Colon and Rectal Surgery, Surgery  Contact information:  [address]  [address]  [place] [address]  [phone]                           Patient Instructions:      Ambulatory referral/consult to Home Health   Standing Status: Future   Referral Priority: Routine Referral Type: Home Health   Referral Reason: Specialty Services Required   Requested Specialty: Home Health Services   Number of Visits Requested: 1     Notify your health care provider if you experience any of the following:  temperature >100.4     Notify your health care provider if you experience any of the following:  persistent nausea and vomiting or diarrhea     Notify your health care provider if you experience any of the following:  severe uncontrolled pain      Notify your health care provider if you experience any of the following:  redness, tenderness, or signs of infection (pain, swelling, redness, odor or green/yellow discharge around incision site)     Notify your health care provider if you experience any of the following:  difficulty breathing or increased cough     Notify your health care provider if you experience any of the following:  increased confusion or weakness     Activity as tolerated       Significant Diagnostic Studies: Labs: CMP   Recent Labs   Lab 11/29/24  0709 11/30/24  0445    137   K 4.4 3.5    106   CO2 25 24   GLU 88 78   BUN 18 14   CREATININE 0.6 0.5   CALCIUM 8.3* 8.2*   PROT 5.4* 5.5*   ALBUMIN 2.9* 3.0*   BILITOT 1.4* 1.3*   ALKPHOS 857* 1,068*   AST 9* 10   ALT 7* 6*   ANIONGAP 5* 7*    and CBC   Recent Labs   Lab 11/29/24  0709 11/30/24  0445   WBC 7.10 5.65   HGB 8.7* 9.0*   HCT 28.4* 30.1*    491*       Pending Diagnostic Studies:       Procedure Component Value Units Date/Time    Specimen to Pathology - Surgery [[id number]] Collected: 11/30/24 1213    Order Status: Sent Lab Status: No result     Specimen: Tissue            Medications:  Reconciled Home Medications:      Medication List        START taking these medications      cefUROXime 500 MG tablet  Commonly known as: CEFTIN  Take 1 tablet (500 mg total) by mouth 2 (two) times daily. for 7 days     metroNIDAZOLE 500 MG tablet  Commonly known as: FLAGYL  Take 1 tablet (500 mg total) by mouth every 8 (eight) hours. for 7 days            CONTINUE taking these medications      acetaminophen 325 MG tablet  Commonly known as: TYLENOL  Take 2 tablets (650 mg total) by mouth every 8 (eight) hours as needed for Pain (pain scale 1-4).     amlodipine-benazepril 5-20 mg 5-20 mg per capsule  Commonly known as: LOTREL  Take 1 capsule by mouth once daily.     MAXIMILIANO-GEST ANTACID 200 mg calcium (500 mg) chewable tablet  Generic drug: calcium carbonate  Chew and swallow 2  tablets (1,000 mg total) by mouth once daily.     cyclobenzaprine 10 MG tablet  Commonly known as: FLEXERIL  Take 1 tablet (10 mg total) by mouth 3 (three) times daily as needed for Muscle spasms.     docusate sodium 100 MG capsule  Commonly known as: COLACE  Take 1 capsule (100 mg total) by mouth 2 (two) times daily.     doxycycline 100 MG Cap  Commonly known as: VIBRAMYCIN  Take 100 mg by mouth every 12 (twelve) hours.     oxyCODONE-acetaminophen  mg per tablet  Commonly known as: PERCOCET  Take 1 tablet by mouth every 4 (four) hours as needed for Pain.     pregabalin 200 MG Cap  Commonly known as: LYRICA  Take 1 capsule (200 mg total) by mouth 3 (three) times daily.            STOP taking these medications      amoxicillin-clavulanate 875-125mg 875-125 mg per tablet  Commonly known as: AUGMENTIN            ASK your doctor about these medications      levETIRAcetam 500 MG Tab  Commonly known as: KEPPRA  Take 1 tablet (500 mg total) by mouth 2 (two) times daily.              Indwelling Lines/Drains at time of discharge:   Lines/Drains/Airways       None                   Time spent on the discharge of patient: 35 minutes         Chelsea Bose PA-C  Department of Hospital Medicine  [facility]

## 2024-11-30 NOTE — TRANSFER OF CARE
"Anesthesia Transfer of Care Note    Patient: Froilan Ray    Procedure(s) Performed: Procedure(s) (LRB):  EGD (ESOPHAGOGASTRODUODENOSCOPY) (N/A)  COLONOSCOPY (N/A)    Patient location: GI    Anesthesia Type: general    Transport from OR: Transported from OR on room air with adequate spontaneous ventilation    Post pain: adequate analgesia    Post assessment: no apparent anesthetic complications and tolerated procedure well    Post vital signs: stable    Level of consciousness: sedated    Nausea/Vomiting: no nausea/vomiting    Complications: none    Transfer of care protocol was followed      Last vitals: Visit Vitals  BP (!) 162/74   Pulse 80   Temp 36.6 °C (97.9 °F) (Oral)   Resp 18   Ht 5' 6" (1.676 m)   Wt 53.7 kg (118 lb 6.2 oz)   SpO2 99%   BMI 19.11 kg/m²     "

## 2024-11-30 NOTE — HOSPITAL COURSE
Patient was admitted to the hospital with symptomatic anemia and lower abdominal pain. Patient was monitored closely throughout hospital stay. CT abdomen was obtained on admission was concerning for colitis. Patient was started on IV meropenem.  Blood cultures were obtained on admission and remained in process no growth to date. GI was consulted on admission. Home blood pressure medications were held given labile blood pressure. She was transfused 1 unit PRBC with improvement in H&H. Patient remained symptomatic and additional 1 unit PRBC was transfused. Patient's H&H remained stable post transfusion and she reported resolution symptoms. Blood cultures remained with no growth to date and patient was without leukocytosis so antibiotics were de-escalated. Patient was developed rectal prolapse with prep. General surgery was consulted and GI was notified. Patient underwent colonoscopy with GI on 11/30 which revealed scattered small-mouthed diverticula, a patchy area of moderately altered vascular, atrophic, eroded, granular and scattered ulcerated mucosa was found in the sigmoid colon, in the descending colon and in the transverse colon  and biopsies were obtained. Rectal prolapse was reduced and colonoscopy. Patient was seen on day of discharge. Patient was cleared for discharge by General surgery and GI.  Patient was prescribed antibiotics at discharge. Patient was not prescribed pain medications given she was under pain contract. Patient to follow up with PCP, GI, and General surgery. Patient was instructed to keep a daily blood pressure log and bring to future appointments. Home health was set up at discharge with CBC and CMP to be drawn weekly with results sent to PCP. Patient was instructed to take over-the-counter iron supplementation and stool softener as needed. Patient was instructed to take over-the-counter probiotics while taking antibiotics. Return precautions discussed with the patient, patient's ,  patient's son who all voiced understanding. All questions answered.

## 2024-11-30 NOTE — PLAN OF CARE
Problem: Adult Inpatient Plan of Care  Goal: Plan of Care Review  Outcome: Met  Goal: Patient-Specific Goal (Individualized)  Outcome: Met  Goal: Absence of Hospital-Acquired Illness or Injury  Outcome: Met  Goal: Optimal Comfort and Wellbeing  Outcome: Met  Goal: Readiness for Transition of Care  Outcome: Met     Problem: Acute Kidney Injury/Impairment  Goal: Fluid and Electrolyte Balance  Outcome: Met  Goal: Improved Oral Intake  Outcome: Met  Goal: Effective Renal Function  Outcome: Met     Problem: Fall Injury Risk  Goal: Absence of Fall and Fall-Related Injury  Outcome: Met     Problem: Skin Injury Risk Increased  Goal: Skin Health and Integrity  Outcome: Met

## 2024-11-30 NOTE — PLAN OF CARE
Patient cleared for discharge from case management standpoint.    Follow up appointments scheduled and added to AVS.     sent d/c orders to [facility].     Chart and discharge orders reviewed.  Patient discharged home with no further case management needs.        11/30/24 1522   Final Note   Assessment Type Final Discharge Note   Anticipated Discharge Disposition Home-Health   Post-Acute Status   Post-Acute Authorization Home Health   Home Health Status Set-up Complete/Auth obtained   Discharge Delays None known at this time

## 2024-11-30 NOTE — CONSULTS
GENERAL SURGERY  INPATIENT CONSULT    REASON FOR CONSULT: Rectal prolapse, possible colonic lesion    HPI: Froilan Ray is a 79 y.o. female who presented to the emergency room with lower abdominal pain, ongoing constipation and refractory colitis. Patient had recently been placed on Augmentin for CT scan which showed concerns for colitis 2 days prior to presentation.  Upon presentation she was found to have significant anemia with a hemoglobin of 6.4 and hypotension which improved with IV fluids. Had acute kidney injury and hyponatremia. Underwent repeat CT imaging which showed circumferential wall thickening along the right colon which may represent colitis. Also some concern for possible splenic flexure narrowing.  Admitted to Hospital Medicine. GI was consulted. During prep she had additional straining and developed rectal prolapse which has occurred in the past. Despite manual pressure and application of table sugar prolapsed was unable to be reduced.  General surgery has been consulted for evaluation.  Patient was seen in the holding room planning to undergo EGD and colonoscopy today. Complains of pain at the anus/rectum from prolapse. Patient does have a history of erosive gastritis and duodenitis.    I have reviewed the patient's chart including prior progress notes, procedures and testing. Patient was has a history of recurrent hiatal hernia, diverticulosis, cholecystectomy with postop dilatation of common bile duct, DJD, chronic opiate use for pain, history of back surgery.    ROS:   Review of Systems    PROBLEM LIST:  Patient Active Problem List   Diagnosis    Chronic pain with uncomplicated opioid dependence    Encounter for postoperative wound check    Uterine mass    Femur fracture, left    Essential hypertension    Peripheral neuropathy    Seizure disorder    Open wound of left heel    Colitis    Drug-induced constipation    Disease of biliary tract    Severe anemia    Chronic gastric ulcer with  bleeding    Iron deficiency anemia    Paroxysmal atrial fibrillation    Hypokalemia    Spinal stenosis    Uses walker    History of seizures    Anticoagulated    S/P ORIF (open reduction internal fixation) fracture    Osteoporosis    Depression    Closed fracture of acetabulum    Kyphoscoliosis and scoliosis    Strangulated hernia of abdominal wall    Chronic heart failure with preserved ejection fraction    Painful orthopaedic hardware    Staphylococcal arthritis of left hip    History of bacteremia    Oral lesion    Urinary retention    Antibiotic Spacer failure status post Girdlestone procedure    Hypocalcemia    Hypoalbuminemia    Vitamin D deficiency    Bacteriuria    Hypophosphatemia    PICC (peripherally inserted central catheter) in place    Seizure    Hypertension    Chronic pain    Chronic infection of left hip, currently on antibiotics    Post-ictal confusion    Symptomatic anemia    EPDRO (acute kidney injury)    Hyponatremia         HISTORY  Past Medical History:   Diagnosis Date    Anemia     Arthritis     Bleeding ulcer 07/2016    Chronic pain     DDD (degenerative disc disease), cervical     DDD (degenerative disc disease), lumbar     Depression     Disc degeneration, lumbosacral     Diverticulitis     Encounter for blood transfusion     Hypertension     IBS (irritable bowel syndrome)     Neuropathy of both feet     Seizures     none  since 2017    Umbilical hernia 2020       Past Surgical History:   Procedure Laterality Date    ARTHROPLASTY, HIP, GIRDLESTONE, POSTERIOR APPROACH Left 1/19/2024    Procedure: ARTHROPLASTY, HIP, GIRDLESTONE, POSTERIOR APPROACH;  Surgeon: Bulmaro Tellez MD;  Location: [facility] OR [address] [place] [address];  Service: Orthopedics;  Laterality: Left;    ARTHROPLASTY, HIP, GIRDLESTONE, POSTERIOR APPROACH Left 1/25/2024    Procedure: Irrigation and debridement left hip,;  Surgeon: Juanito Painting MD;  Location: [facility] OR [address] [place] [address];  Service: Orthopedics;  Laterality: Left;    ARTHROPLASTY,  HIP, GIRDLESTONE, POSTERIOR APPROACH Left 2/15/2024    Procedure: Revision hip antibiotic spacer head exchange, left, lateral, peg board, depuy osteomed in room, vanc, ancef, txa,;  Surgeon: Bulmaro Tellez MD;  Location: [facility] OR 2ND FLR;  Service: Orthopedics;  Laterality: Left;    BACK SURGERY      BREAST BIOPSY      BREAST SURGERY Right     lumpectomy    CAUDAL EPIDURAL STEROID INJECTION N/A 01/28/2022    Procedure: Injection-steroid-epidural-caudal;  Surgeon: Luzmaria Marcelino MD;  Location: [facility] OR;  Service: Pain Management;  Laterality: N/A;    COLONOSCOPY N/A 01/14/2022    Procedure: COLONOSCOPY;  Surgeon: Abby Landers MD;  Location: [facility] ENDO;  Service: Endoscopy;  Laterality: N/A;    ENDOSCOPIC ULTRASOUND OF UPPER GASTROINTESTINAL TRACT N/A 07/21/2020    Procedure: ULTRASOUND, UPPER GI TRACT, ENDOSCOPIC;  Surgeon: Marcio Nguyễn III, MD;  Location: [facility] ENDO;  Service: Endoscopy;  Laterality: N/A;    ENDOSCOPIC ULTRASOUND OF UPPER GASTROINTESTINAL TRACT N/A 9/27/2024    Procedure: ULTRASOUND, UPPER GI TRACT, ENDOSCOPIC;  Surgeon: Marcio Nguyễn III, MD;  Location: [facility] ENDO;  Service: Endoscopy;  Laterality: N/A;    ESOPHAGOGASTRODUODENOSCOPY N/A 01/14/2022    Procedure: EGD (ESOPHAGOGASTRODUODENOSCOPY);  Surgeon: Abby Landers MD;  Location: [facility] ENDO;  Service: Endoscopy;  Laterality: N/A;    ESOPHAGOGASTRODUODENOSCOPY N/A 06/10/2022    Procedure: EGD (ESOPHAGOGASTRODUODENOSCOPY);  Surgeon: Abby aLnders MD;  Location: [facility] ENDO;  Service: Endoscopy;  Laterality: N/A;    EYE SURGERY      cataract    FLAP GRAFT, LOCAL Left 2/15/2024    Procedure: FLAP GRAFT, LOCAL;  Surgeon: Bulmaro Tellez MD;  Location: [facility] OR 2ND FLR;  Service: Orthopedics;  Laterality: Left;    HYSTERECTOMY      INTRAMEDULLARY RODDING OF TROCHANTER OF FEMUR Left 12/11/2018    Procedure: INSERTION, INTRAMEDULLARY SANTOS, FEMUR, TROCHANTER;  Surgeon: Eulalio De La Cruz MD;  Location: [facility] OR;  Service:  Orthopedics;  Laterality: Left;    IRRIGATION AND DEBRIDEMENT OF LOWER EXTREMITY Left 1/19/2024    Procedure: IRRIGATION AND DEBRIDEMENT, LOWER EXTREMITY;  Surgeon: Bulmaro Tellez MD;  Location: [facility] OR 2ND FLR;  Service: Orthopedics;  Laterality: Left;    IRRIGATION AND DEBRIDEMENT OF LOWER EXTREMITY Left 2/15/2024    Procedure: IRRIGATION AND DEBRIDEMENT, LOWER EXTREMITY;  Surgeon: Bulmaro Tellez MD;  Location: [facility] OR 2ND FLR;  Service: Orthopedics;  Laterality: Left;    LAPAROSCOPIC CHOLECYSTECTOMY N/A 9/8/2024    Procedure: CHOLECYSTECTOMY, LAPAROSCOPIC;  Surgeon: Cipriano Ambrosio MD;  Location: [facility] OR;  Service: General;  Laterality: N/A;    REPAIR OF EPIGASTRIC HERNIA N/A 12/7/2023    Procedure: REPAIR, HERNIA, EPIGASTRIC;  Surgeon: Vipul Escobar MD;  Location: [facility] OR;  Service: General;  Laterality: N/A;    SPINAL CORD STIMULATOR IMPLANT  09/18/2013    and removal    SPINE SURGERY  2006    lumbar L2-S1 decompression.    SPINE SURGERY      cervical decompression    TONSILLECTOMY         Social History     Tobacco Use    Smoking status: Never    Smokeless tobacco: Never   Substance Use Topics    Alcohol use: No    Drug use: No       Family History   Problem Relation Name Age of Onset    Diabetes Mother      Hypertension Mother      Irritable bowel syndrome Mother      Diabetes Father          insulin dependent    Hypertension Father      Heart disease Father      Coronary artery disease Father      Depression Father      Diabetes Sister      Diabetes Brother      COPD Brother           MEDS:  No current facility-administered medications on file prior to encounter.     Current Outpatient Medications on File Prior to Encounter   Medication Sig Dispense Refill    acetaminophen (TYLENOL) 325 MG tablet Take 2 tablets (650 mg total) by mouth every 8 (eight) hours as needed for Pain (pain scale 1-4).  0    amlodipine-benazepril 5-20 mg (LOTREL) 5-20 mg per capsule Take 1 capsule by mouth once daily.  90 capsule 3    amoxicillin-clavulanate 875-125mg (AUGMENTIN) 875-125 mg per tablet Take 1 tablet by mouth 2 (two) times daily. for 7 days 14 tablet 0    calcium carbonate (TUMS) 200 mg calcium (500 mg) chewable tablet Chew and swallow 2 tablets (1,000 mg total) by mouth once daily. (Patient taking differently: Take 1,000 mg by mouth daily as needed for Heartburn.) 60 tablet 11    cyclobenzaprine (FLEXERIL) 10 MG tablet Take 1 tablet (10 mg total) by mouth 3 (three) times daily as needed for Muscle spasms.      docusate sodium (COLACE) 100 MG capsule Take 1 capsule (100 mg total) by mouth 2 (two) times daily. 60 capsule 1    doxycycline (VIBRAMYCIN) 100 MG Cap Take 100 mg by mouth every 12 (twelve) hours.      oxyCODONE-acetaminophen (PERCOCET)  mg per tablet Take 1 tablet by mouth every 4 (four) hours as needed for Pain. 12 tablet 0    pregabalin (LYRICA) 200 MG Cap Take 1 capsule (200 mg total) by mouth 3 (three) times daily. 90 capsule 0    levETIRAcetam (KEPPRA) 500 MG Tab Take 1 tablet (500 mg total) by mouth 2 (two) times daily. (Patient not taking: Reported on 11/27/2024) 180 tablet 1    [DISCONTINUED] pantoprazole (PROTONIX) 40 MG tablet Take 1 tablet (40 mg total) by mouth 2 (two) times daily. 60 tablet 4       ALLERGIES:  Review of patient's allergies indicates:  No Known Allergies      VITALS:  Temp:  [97.8 °F (36.6 °C)-98.7 °F (37.1 °C)] 97.9 °F (36.6 °C)  Pulse:  [80-98] 80  Resp:  [17-20] 18  SpO2:  [93 %-99 %] 99 %  BP: (131-162)/(70-78) 162/74    I/O last 3 completed shifts:  In: 633.3 [P.O.:260; Blood:373.3]  Out: 600 [Urine:600]      PHYSICAL EXAM:  Physical Exam  Vitals reviewed.   Constitutional:       General: She is not in acute distress.     Appearance: Normal appearance. She is well-developed.   HENT:      Head: Normocephalic and atraumatic.      Nose: Nose normal.   Eyes:      General: No scleral icterus.     Conjunctiva/sclera: Conjunctivae normal.   Neck:      Trachea: No tracheal  tenderness or tracheal deviation.   Cardiovascular:      Rate and Rhythm: Normal rate and regular rhythm.      Pulses: Normal pulses.   Pulmonary:      Effort: Pulmonary effort is normal. No accessory muscle usage or respiratory distress.      Breath sounds: Normal breath sounds.   Abdominal:      General: There is no distension.      Palpations: Abdomen is soft.      Tenderness: There is no abdominal tenderness.   Genitourinary:         Comments: Rectal prolapse of 2-3 cm, mucosa inflamed and edematous but not necrotic  Musculoskeletal:         General: No swelling or tenderness. Normal range of motion.      Cervical back: Normal range of motion and neck supple. No rigidity.   Skin:     General: Skin is warm and dry.      Coloration: Skin is not jaundiced.      Findings: No erythema.   Neurological:      General: No focal deficit present.      Mental Status: She is alert and oriented to person, place, and time.      Motor: No weakness or abnormal muscle tone.   Psychiatric:         Mood and Affect: Mood normal.         Behavior: Behavior normal.         Thought Content: Thought content normal.         Judgment: Judgment normal.           LABS:  Lab Results   Component Value Date    WBC 5.65 11/30/2024    RBC 3.75 (L) 11/30/2024    HGB 9.0 (L) 11/30/2024    HCT 30.1 (L) 11/30/2024    HCT 33 (L) 02/15/2024     (H) 11/30/2024     Lab Results   Component Value Date    GLU 78 11/30/2024     11/30/2024    K 3.5 11/30/2024     11/30/2024    CO2 24 11/30/2024    BUN 14 11/30/2024    CREATININE 0.5 11/30/2024    CALCIUM 8.2 (L) 11/30/2024     Lab Results   Component Value Date    ALT 6 (L) 11/30/2024    AST 10 11/30/2024    ALKPHOS 1,068 (H) 11/30/2024    BILITOT 1.3 (H) 11/30/2024     Lab Results   Component Value Date    MG 2.4 11/30/2024    PHOS 3.0 02/20/2024       STUDIES:  Images and reports were personally reviewed.    CT abdomen pelvis 11/27/2024  Impression:     Circumferential wall thickening  along the colon more prominent involving the right colon may relate to colitis, with progression of wall thickening along the right colon compared to the prior examination.     There is moderate prominence of the right colon with mild-to-moderate prominence of the transverse colon and relative transition of diminished caliber at the junction of the splenic flexure and descending colon, this may be transient however if the patient has not had recent colon screening exam I would recommend follow-up colonoscopy or fluoroscopic barium exam.     Mild free fluid of the abdomen and pelvis.     Pulmonary nodules at the lung bases appears stable compared to the study of September 12, 2019.     Moderate to large hiatal hernia.     Additional findings as above.     This report was flagged in Epic as abnormal.         ASSESSMENT & PLAN:  79 y.o. female with colitis, questionable lesion/narrowing of the proximal descending colon, rectal prolapse  -GI completed colonoscopy today, prolapse able to be reduced, there was moderately altered vascular, atrophic, eroded, granular and scattered ulcerated mucosa in the sigmoid colon, the descending colon and transverse colon but appeared to be healing, biopsies were taken  -no definitive mass or obstructing lesion present, no evidence of severe ulceration or necrosis and the area of ulceration does appear to be healing  -prolapse likely a chronic issue exacerbated by prep  -no immediate surgical intervention recommended  -continue medical management, follow up biopsies  -can follow up as an outpatient with colorectal surgery for rectal prolapse

## 2024-11-30 NOTE — PLAN OF CARE
Problem: Physical Therapy  Goal: Physical Therapy Goal  Description: Goals to be met by: 24     Patient will increase functional independence with mobility by performin. Supine to sit with [place]  2. Sit to supine with [place]  3. Sit to stand transfer with Stand-by Assistance  4. Bed to chair transfer with Stand-by Assistance using Rolling Walker  5. Gait  x 150 feet with Stand-by Assistance using Rolling Walker.     Outcome: Progressing

## 2024-11-30 NOTE — PROGRESS NOTES
Pharmacist Renal Adjustment Note    Froilan Ray is a 79 y.o. female being treated with Meropenem.     Patient Data:    Vital Signs (Most Recent):  Temp: 98.7 °F (37.1 °C) (11/29/24 2315)  Pulse: 96 (11/29/24 2315)  Resp: 20 (11/30/24 0300)  BP: 139/72 (11/29/24 2315)  SpO2: 99 % (11/29/24 2315) Vital Signs (72h Range):  Temp:  [97.3 °F (36.3 °C)-99.7 °F (37.6 °C)]   Pulse:  [76-96]   Resp:  [14-31]   BP: ()/(36-90)   SpO2:  [92 %-100 %]      Recent Labs   Lab 11/27/24 2026 11/28/24  0619 11/29/24  0709   CREATININE 1.9* 1.1 0.6     Serum creatinine: 0.6 mg/dL 11/29/24 0709  Estimated creatinine clearance: 71.2 mL/min    Meropenem 1 g every 12 hours will be changed to Meropenem 1 g every 8 hours per Renal Dose Adjustment protocol.     Pharmacist's Name: Eliana Hernandez  Pharmacist's Extension: [phone]

## 2024-11-30 NOTE — NURSING
Discharge instructions reviewed with and given to patient. Verbalized understanding. IV removed without difficulty, catheter intact.

## 2024-11-30 NOTE — [employer] PATIENT INSTRUCTIONS
Discharge Summary/Instructions after an Endoscopic Procedure  Patient Name: Froilan Ray  Patient MRN: [id number]  Patient YOB: 1945  Saturday, November 30, 2024  Marcio Nguyễn III, MD  RESTRICTIONS:  During your procedure today, you received medications for sedation.  These   medications may affect your judgment, balance and coordination.  Therefore,   for 24 hours, you have the following restrictions:   - DO NOT drive a car, operate machinery, make legal/financial decisions,   sign important papers or drink alcohol.    ACTIVITY:  Today: no heavy lifting, straining or running due to procedural   sedation/anesthesia.  The following day: return to full activity including work.  DIET:  Eat and drink normally unless instructed otherwise.     TREATMENT FOR COMMON SIDE EFFECTS:  - Mild abdominal pain, nausea, belching, bloating or excessive gas:  rest,   eat lightly and use a heating pad.  - Sore Throat: treat with throat lozenges and/or gargle with warm salt   water.  - Because air was used during the procedure, expelling large amounts of air   from your rectum or belching is normal.  - If a bowel prep was taken, you may not have a bowel movement for 1-3 days.    This is normal.  SYMPTOMS TO WATCH FOR AND REPORT TO YOUR PHYSICIAN:  1. Abdominal pain or bloating, other than gas cramps.  2. Chest pain.  3. Back pain.  4. Signs of infection such as: chills or fever occurring within 24 hours   after the procedure.  5. Rectal bleeding, which would show as bright red, maroon, or black stools.   (A tablespoon of blood from the rectum is not serious, especially if   hemorrhoids are present.)  6. Vomiting.  7. Weakness or dizziness.  GO DIRECTLY TO THE NEAREST EMERGENCY ROOM IF YOU HAVE ANY OF THE FOLLOWING:      Difficulty breathing              Chills and/or fever over 101 F   Persistent vomiting and/or vomiting blood   Severe abdominal pain   Severe chest pain   Black, tarry stools   Bleeding- more than  one tablespoon   Any other symptom or condition that you feel may need urgent attention  Your doctor recommends these additional instructions:  If any biopsies were taken, your doctors clinic will contact you in 1 to 2   weeks with any results.  - Discharge patient to home (ambulatory).   - Patient has a contact number available for emergencies.  The signs and   symptoms of potential delayed complications were discussed with the   patient.  Return to normal activities tomorrow.  Written discharge   instructions were provided to the patient.   - Resume previous diet.   - Continue present medications.   - Repeat colonoscopy is not recommended due to current age (66 years or   older) for surveillance.   - Await pathology results.   - Will f/u path but some concerns for resolving ischemic colitis. In regards   to rectal prolapse, this is chronic but worse w/ bowel prep. Will refer to   outpatient colo-rectal for evaluation to determine if conservative vs   surgical evaluation warranted. Recent issues were caused by bowel prep.  For questions, problems or results please call your physician - Marcio Nguyễn III, MD at Work:  [phone].  [facility], EMERGENCY ROOM PHONE NUMBER: [phone]  IF A COMPLICATION OR EMERGENCY SITUATION ARISES AND YOU ARE UNABLE TO REACH   YOUR PHYSICIAN - GO DIRECTLY TO THE EMERGENCY ROOM.  Marcio Nguyễn III, MD  11/30/2024 11:50:48 AM  This report has been verified and signed electronically.  Dear patient,  As a result of recent federal legislation (The Federal Cures Act), you may   receive lab or pathology results from your procedure in your [facility]   account before your physician is able to contact you. Your physician or   their representative will relay the results to you with their   recommendations at their soonest availability.  Thank you,  [employer]

## 2024-11-30 NOTE — CARE UPDATE
11/30/24 0656   Patient Assessment/Suction   Level of Consciousness (AVPU) alert   Respiratory Effort Normal;Unlabored   Rhythm/Pattern, Respiratory pattern regular;unlabored   Cough Frequency no cough   PRE-TX-O2   Device (Oxygen Therapy) room air   SpO2 (!) 93 %   Pulse Oximetry Type Intermittent   $ Pulse Oximetry - Multiple Charge Pulse Oximetry - Multiple   Pulse 85   Resp 18   Education   $ Education 15 min

## 2024-11-30 NOTE — ANESTHESIA POSTPROCEDURE EVALUATION
Anesthesia Post Evaluation    Patient: Froilan Ray    Procedure(s) Performed: Procedure(s) (LRB):  COLONOSCOPY (N/A)    Final Anesthesia Type: general      Patient location during evaluation: GI PACU  Patient participation: Yes- Able to Participate  Level of consciousness: awake and alert  Post-procedure vital signs: reviewed and stable  Pain management: adequate  Airway patency: patent    PONV status at discharge: No PONV  Anesthetic complications: no      Cardiovascular status: hemodynamically stable  Respiratory status: unassisted, spontaneous ventilation and room air  Hydration status: euvolemic  Follow-up not needed.              Vitals Value Taken Time   /76 11/30/24 1300   Temp 36.2 °C (97.2 °F) 11/30/24 1155   Pulse 82 11/30/24 1302   Resp 16 11/30/24 1219   SpO2 100 % 11/30/24 1302   Vitals shown include unfiled device data.      Event Time   Out of Recovery 11/30/2024 12:21:14         Pain/Duarte Score: Pain Rating Prior to Med Admin: 8 (11/30/2024  3:00 AM)  Pain Rating Post Med Admin: 0 (11/30/2024  4:00 AM)

## 2024-11-30 NOTE — DISCHARGE INSTRUCTIONS
Follow up with PCP, GI, and colorectal surgery.    Home health was ordered at discharge with labs to be drawn weekly and sent to PCP.    Prescribed antibiotics at discharge.  Stop taking Augmentin.    Can take over-the-counter probiotic or eat yogurt with natural cultures.  Blood pressure medications have been held this hospitalization due to labile blood pressure.  Take blood pressure daily and keep log to bring to future appointments.      Discharge Instructions, [facility] Medicine    Thank you for choosing [facility] for your medical care. The primary doctor who is taking care of you at the time of your discharge is Laverne Galicia DO.     You were admitted to the hospital with Symptomatic anemia.     Please note your discharge instructions, including diet/activity restrictions, follow-up appointments, and medication changes.  If you have any questions about your medical issues, prescriptions, or any other questions, please feel free to contact the [facility] Medicine Dept at [phone] and we will help.    If you are previously with Home health, outpatient PT/OT or under a therapy program, you are cleared to return to those programs.    Please direct all long term medication refills and follow up to your primary care provider, Alphonse Boothe III, MD. Thank you again for letting us take care of your health care needs.    Please note the following discharge instructions per your discharging physician-  Chelsea Bose PA-C

## 2024-11-30 NOTE — ANESTHESIA PREPROCEDURE EVALUATION
11/30/2024  Froilan Ray is a 79 y.o., female.             Lab Results   Component Value Date    WBC 5.65 11/30/2024    HGB 9.0 (L) 11/30/2024    HCT 30.1 (L) 11/30/2024    MCV 80 (L) 11/30/2024     (H) 11/30/2024     BMP  Lab Results   Component Value Date     11/30/2024    K 3.5 11/30/2024     11/30/2024    CO2 24 11/30/2024    BUN 14 11/30/2024    CREATININE 0.5 11/30/2024    CALCIUM 8.2 (L) 11/30/2024    ANIONGAP 7 (L) 11/30/2024    GLU 78 11/30/2024    GLU 88 11/29/2024    GLU 85 11/28/2024       Results for orders placed during the hospital encounter of 01/17/24    Echo    Interpretation Summary    Left Ventricle: The left ventricle is normal in size. Normal wall thickness. There is concentric remodeling. Normal wall motion. There is low normal systolic function with a visually estimated ejection fraction of 50 - 55%. There is normal diastolic function.    Right Ventricle: Normal right ventricular cavity size. Wall thickness is normal. Right ventricle wall motion  is normal. Systolic function is normal.    Aortic Valve: There is moderate aortic valve sclerosis. There is moderate annular calcification present.    Mitral Valve: There is severe mitral annular calcification present.    Aorta: Aortic root is normal in size measuring 3.37 cm. Ascending aorta is normal measuring 3.51 cm.    Pulmonary Artery: The estimated pulmonary artery systolic pressure is 25 mmHg.    IVC/SVC: Normal venous pressure at 3 mmHg.              Pre-op Assessment    I have reviewed the Patient Summary Reports.     I have reviewed the Nursing Notes. I have reviewed the NPO Status.   I have reviewed the Medications.     Review of Systems  Anesthesia Hx:  No problems with previous Anesthesia   History of prior surgery of interest to airway management or planning: cervical fusion.         Denies Family Hx of  Anesthesia complications.    Denies Personal Hx of Anesthesia complications.                    Social:  No Alcohol Use, Non-Smoker Chronic pain with uncomplicated opioid dependence      Hematology/Oncology:    Oncology Normal    -- Anemia:               Hematology Comments: Hx of transfusion                    EENT/Dental:  EENT/Dental Normal           Cardiovascular:     Hypertension, poorly controlled    Dysrhythmias atrial fibrillation         ECG has been reviewed. Chronic heart failure with preserved ejection fraction                           Pulmonary:  Pulmonary Normal                       Renal/:  Renal/ Normal                 Hepatic/GI:   PUD, (Gastric ulcer)     Abdominal pain but No nausea or vomiting.  History of gastric ulcer with bleeding             Musculoskeletal:  Arthritis   Chronic infection of left hip, currently on antibiotics     Bone Disorders:    Osteoporosis   Spine Disorders: cervical and lumbar Disc disease, Degenerative disease and Chronic Pain           Neurological:    Neuromuscular Disease,   Seizures    Hx of spinal stimulator      Chronic Pain Syndrome   Peripheral Neuropathy (feet)                          Endocrine:  Endocrine Normal            Psych:  Psychiatric History  depression                Physical Exam  General: Well nourished, Alert, Cooperative and Oriented    Airway:  Mallampati: II   Mouth Opening: Normal  TM Distance: Normal  Tongue: Normal  Neck ROM: Normal ROM    Dental:  Periodontal disease    Chest/Lungs:  Clear to auscultation, Normal Respiratory Rate    Heart:  Rate: Normal  Rhythm: Regular Rhythm  Sounds: Normal    Musculoskeletal:  Chronic infection of left hip, currently on antibiotics      Anesthesia Plan  Type of Anesthesia, risks & benefits discussed:    Anesthesia Type: Gen Natural Airway  Intra-op Monitoring Plan: Standard ASA Monitors  Induction:  IV  Informed Consent: Informed consent signed with the Patient and all parties understand the  risks and agree with anesthesia plan.  All questions answered. Patient consented to blood products? Yes  ASA Score: 3 Emergent    Ready For Surgery From Anesthesia Perspective.     .

## 2024-12-02 ENCOUNTER — TELEPHONE (OUTPATIENT)
Dept: SURGERY | Facility: CLINIC | Age: 79
End: 2024-12-02
Payer: MEDICARE

## 2024-12-02 LAB
BACTERIA BLD CULT: NORMAL
BACTERIA BLD CULT: NORMAL

## 2024-12-04 ENCOUNTER — TELEPHONE (OUTPATIENT)
Dept: SURGERY | Facility: CLINIC | Age: 79
End: 2024-12-04
Payer: MEDICARE

## 2024-12-04 NOTE — TELEPHONE ENCOUNTER
Called pt to schedule appointment with Dr. Ambrosio for rectal prolapse. Pt states she has been able to manually reduce the prolapse. Appt scheduled 1/21/25 @ 3:15 PM. Pt agreed to date and time, location verified.

## 2024-12-16 ENCOUNTER — EXTERNAL HOME HEALTH (OUTPATIENT)
Dept: HOME HEALTH SERVICES | Facility: HOSPITAL | Age: 79
End: 2024-12-16
Payer: MEDICARE

## 2024-12-18 NOTE — ED PROVIDER NOTES
"Encounter Date: 1/13/2022    SCRIBE #1 NOTE: ILe, am scribing for, and in the presence of, Lopez Hernandez MD.       History     Chief Complaint   Patient presents with    Anemia     Time seen by provider: 3:20 PM on 01/13/2022    Froilan Ray is a 76 y.o. female who presents to the ED with an onset of generalized fatigue for the past 6 months. Patient was referred to the ED for low blood counts after PCP had conducted "routine" blood work this morning. She reports no other symptoms. Patient has a hx of anemia. She mentions hx of a bleeding ulcer in the abdomen 3 years ago. The patient denies any abdominal pain or any other symptoms at this time. PMHx of HTN, anemia, and chronic back pain. No pertinent PSHx. Patient is not a smoker. She is fully vaccinated for Covid-19.       The history is provided by the patient.     Review of patient's allergies indicates:  No Known Allergies  Past Medical History:   Diagnosis Date    Anemia     Arthritis     Bleeding ulcer 07/2016    Chronic pain     DDD (degenerative disc disease), cervical     DDD (degenerative disc disease), lumbar     Depression     Disc degeneration, lumbosacral     Diverticulitis     Encounter for blood transfusion     Hypertension     IBS (irritable bowel syndrome)     Neuropathy of both feet     Seizures     none  since 2017    Umbilical hernia 2020     Past Surgical History:   Procedure Laterality Date    BACK SURGERY      BREAST SURGERY Right     lumpectomy    ENDOSCOPIC ULTRASOUND OF UPPER GASTROINTESTINAL TRACT N/A 7/21/2020    Procedure: ULTRASOUND, UPPER GI TRACT, ENDOSCOPIC;  Surgeon: Marcio Nguyễn III, MD;  Location: [facility];  Service: Endoscopy;  Laterality: N/A;    EYE SURGERY      cataract    HYSTERECTOMY      INTRAMEDULLARY RODDING OF TROCHANTER OF FEMUR Left 12/11/2018    Procedure: INSERTION, INTRAMEDULLARY SANTOS, FEMUR, TROCHANTER;  Surgeon: Eulalio De La Cruz MD;  Location: [facility];  Service: " RN called pt to follow up on home BP. Pt stated that she is unsure if she did it correctly. RN instructed pt how to take her BP. Pt verbalized understanding. RN received home BP reading sent via e-advice. Pt denied headache, chest pain, SOB, N&V, vision changes. Pt stated that her grandson who is a navy medic will get home this weekend and recheck her BP. Per chart review, Dr. Priest will follow up on pt BP on upcoming appt. RN advised pt to continue taking her home BP and contact office if she has persistent elevated BP or has symptoms of above. Pt verbalized understanding.     Orthopedics;  Laterality: Left;    SPINAL CORD STIMULATOR IMPLANT  09/18/2013    and removal    SPINE SURGERY  2006    lumbar L2-S1 decompression.    SPINE SURGERY      cervical decompression    TONSILLECTOMY       Family History   Problem Relation Age of Onset    Diabetes Mother     Hypertension Mother     Irritable bowel syndrome Mother     Diabetes Father         insulin dependent    Hypertension Father     Heart disease Father     Coronary artery disease Father     Depression Father     Diabetes Sister     Diabetes Brother     COPD Brother      Social History     Tobacco Use    Smoking status: Never Smoker    Smokeless tobacco: Never Used   Substance Use Topics    Alcohol use: No    Drug use: No     Review of Systems   Constitutional: Positive for fatigue. Negative for chills and fever.   HENT: Negative for nosebleeds.    Eyes: Negative for visual disturbance.   Respiratory: Negative for cough and shortness of breath.    Cardiovascular: Negative for chest pain and palpitations.   Gastrointestinal: Negative for abdominal pain, diarrhea, nausea and vomiting.   Genitourinary: Negative for dysuria and hematuria.   Musculoskeletal: Negative for back pain and neck pain.   Skin: Negative for rash.   Neurological: Negative for seizures, syncope and headaches.       Physical Exam     Initial Vitals [01/13/22 1506]   BP Pulse Resp Temp SpO2   (!) 119/58 82 20 98.1 °F (36.7 °C) 96 %      MAP       --         Physical Exam    Nursing note and vitals reviewed.  Constitutional: She appears well-nourished.   Pallor.    HENT:   Head: Normocephalic and atraumatic.   Eyes: Conjunctivae and EOM are normal.   Neck: Neck supple. No thyroid mass present.   Normal range of motion.  Cardiovascular: Normal rate, regular rhythm and normal heart sounds. Exam reveals no gallop and no friction rub.    No murmur heard.  Pulmonary/Chest: Breath sounds normal. She has no wheezes. She has no rhonchi. She has no rales.    Abdominal: Abdomen is soft. Bowel sounds are normal. There is no abdominal tenderness.   Obese abdomen. Easily reproducible umbilical hernia.    Musculoskeletal:      Cervical back: Normal range of motion and neck supple.     Neurological: She is alert and oriented to person, place, and time. She has normal strength. No cranial nerve deficit or sensory deficit.   Skin: Skin is warm and dry. No rash noted. No erythema.   Psychiatric: She has a normal mood and affect. Her speech is normal. Cognition and memory are normal.         ED Course   Procedures  Labs Reviewed   CBC W/ AUTO DIFFERENTIAL - Abnormal; Notable for the following components:       Result Value    RBC 2.56 (*)     Hemoglobin 4.8 (*)     Hematocrit 18.4 (*)     MCV 72 (*)     MCH 18.8 (*)     MCHC 26.1 (*)     RDW 15.3 (*)     All other components within normal limits    Narrative:       Hemaglobin and Hematocrit critical result(s) called and verbal   readback obtained from Saran Ramos by BTI1 01/13/2022 16:03   COMPREHENSIVE METABOLIC PANEL - Abnormal; Notable for the following components:    CO2 22 (*)     Albumin 3.4 (*)     ALT 9 (*)     All other components within normal limits   VITAMIN B12 - Abnormal; Notable for the following components:    Vitamin B-12 182 (*)     All other components within normal limits   SARS-COV-2 RNA AMPLIFICATION, QUAL   FOLATE   TYPE & SCREEN          Imaging Results    None          Medications   cyclobenzaprine tablet 10 mg (has no administration in time range)   divalproex ER 24 hr tablet 1,000 mg (has no administration in time range)   divalproex ER 24 hr tablet 500 mg (500 mg Oral Not Given 1/13/22 2100)   EScitalopram oxalate tablet 20 mg (has no administration in time range)   oxyCODONE-acetaminophen  mg per tablet 1 tablet (1 tablet Oral Given 1/14/22 0335)   pregabalin capsule 200 mg (200 mg Oral Given 1/13/22 2019)   sodium chloride 0.9% flush 10 mL ( Intravenous Canceled Entry 1/14/22 0600)    potassium bicarbonate disintegrating tablet 50 mEq (has no administration in time range)   potassium bicarbonate disintegrating tablet 35 mEq (has no administration in time range)   potassium bicarbonate disintegrating tablet 60 mEq (has no administration in time range)   magnesium oxide tablet 800 mg (has no administration in time range)   magnesium oxide tablet 800 mg (has no administration in time range)   acetaminophen tablet 1,000 mg (has no administration in time range)   ondansetron injection 4 mg (has no administration in time range)   glucose chewable tablet 16 g (has no administration in time range)   glucose chewable tablet 24 g (has no administration in time range)   dextrose 50% injection 12.5 g (has no administration in time range)   dextrose 50% injection 25 g (has no administration in time range)   glucagon (human recombinant) injection 1 mg (has no administration in time range)   albuterol-ipratropium 2.5 mg-0.5 mg/3 mL nebulizer solution 3 mL (has no administration in time range)   melatonin tablet 9 mg (has no administration in time range)   aluminum-magnesium hydroxide-simethicone 200-200-20 mg/5 mL suspension 30 mL (has no administration in time range)   naloxone 0.4 mg/mL injection 0.02 mg (has no administration in time range)   peg-electrolyte soln 420 gram SolR 4,000 mL (4,000 mLs Oral Given 1/13/22 2020)   furosemide injection 20 mg (20 mg Intravenous Given 1/14/22 0328)     Medical Decision Making:   History:   Old Medical Records: I decided to obtain old medical records.  ED Management:  This patient was emergently assessed shortly after arrival.  Initial vital signs are stable.  She is reporting several months of generalized weakness, intermittent lightheadedness positional changes.  She denies gnawing abdominal pain, frequent nausea or vomiting with blood, black or tarry stools or noting bright red blood in her stool.  CBC significant for hemoglobin of 4.8.  Appears hypochromic  microcytic.  Type and screen secured and the patient warrants transfusion with multiple units of blood.  She requires observation for further workup and transfusion.  Case discussed with and accepted by the on-call hospitalist.  The patient and significant other are in agreement with this disposition and she is transported to an observation guarded condition.          Scribe Attestation:   Scribe #1: I performed the above scribed service and the documentation accurately describes the services I performed. I attest to the accuracy of the note.              I, Dr. Lopez Hernandez, personally performed the services described in this documentation. All medical record entries made by the scribe were at my direction and in my presence.  I have reviewed the chart and agree that the record reflects my personal performance and is accurate and complete. Lopez Hernandez MD.  7:30 AM 01/14/2022      Clinical Impression:   Final diagnoses:  [D64.9] Severe anemia          ED Disposition Condition    Observation               Lopez Hernandez MD  01/14/22 0735

## 2025-01-13 ENCOUNTER — TELEPHONE (OUTPATIENT)
Dept: FAMILY MEDICINE | Facility: CLINIC | Age: 80
End: 2025-01-13
Payer: MEDICARE

## 2025-01-13 NOTE — TELEPHONE ENCOUNTER
----- Message from Becca sent at 1/10/2025 10:19 AM CST -----  Contact: Beatrice rebolledo [facility]  Type: Needs Medical Advice  Who Called:  Beatrice with [facility]  Best Call Back Number: [phone]  Additional Information: Beatrice with [facility] is calling in regards to pts Home Health, stated with her Insurance hanging back to Original Medicare they are needing to have new Home Health Orders submitted for the patient. Can we please have new orders sent to them fax # [phone]. Thank You

## 2025-03-11 NOTE — ASSESSMENT & PLAN NOTE
Maintained on chronic opiate therapy  -check   -continue outpatient opiate therapy and Lyrica     Patient provided discharge instructions. Patient verbalized understanding. Patient leaving ER in stable condition.discharged with friend on wheelchair with all belongings.

## 2025-04-11 ENCOUNTER — OFFICE VISIT (OUTPATIENT)
Dept: FAMILY MEDICINE | Facility: CLINIC | Age: 80
End: 2025-04-11
Payer: MEDICARE

## 2025-04-11 VITALS
OXYGEN SATURATION: 99 % | SYSTOLIC BLOOD PRESSURE: 128 MMHG | BODY MASS INDEX: 21.69 KG/M2 | HEIGHT: 66 IN | WEIGHT: 134.94 LBS | HEART RATE: 94 BPM | TEMPERATURE: 98 F | DIASTOLIC BLOOD PRESSURE: 82 MMHG

## 2025-04-11 DIAGNOSIS — F11.20 UNCOMPLICATED OPIOID DEPENDENCE: Primary | Chronic | ICD-10-CM

## 2025-04-11 DIAGNOSIS — I48.0 PAROXYSMAL ATRIAL FIBRILLATION: ICD-10-CM

## 2025-04-11 DIAGNOSIS — K83.9 DISEASE OF BILIARY TRACT: ICD-10-CM

## 2025-04-11 DIAGNOSIS — I10 ESSENTIAL HYPERTENSION: Chronic | ICD-10-CM

## 2025-04-11 DIAGNOSIS — R79.9 ABNORMAL FINDING OF BLOOD CHEMISTRY, UNSPECIFIED: ICD-10-CM

## 2025-04-11 DIAGNOSIS — Z99.89 USES WALKER: ICD-10-CM

## 2025-04-11 DIAGNOSIS — M25.552 LEFT HIP PAIN: ICD-10-CM

## 2025-04-11 DIAGNOSIS — I50.32 CHRONIC HEART FAILURE WITH PRESERVED EJECTION FRACTION: ICD-10-CM

## 2025-04-11 DIAGNOSIS — E53.8 VITAMIN B12 DEFICIENCY: ICD-10-CM

## 2025-04-11 PROCEDURE — 99999 PR PBB SHADOW E&M-EST. PATIENT-LVL V: CPT | Mod: PBBFAC,,, | Performed by: FAMILY MEDICINE

## 2025-04-11 PROCEDURE — 99214 OFFICE O/P EST MOD 30 MIN: CPT | Mod: S$PBB,,, | Performed by: FAMILY MEDICINE

## 2025-04-11 PROCEDURE — 99215 OFFICE O/P EST HI 40 MIN: CPT | Mod: PBBFAC,PN | Performed by: FAMILY MEDICINE

## 2025-04-11 NOTE — PATIENT INSTRUCTIONS
Jerry Snider MD  [address] [facility] [address]  Phone: [phone]  Fax: [phone]

## 2025-04-11 NOTE — PROGRESS NOTES
SCRIBE #1 NOTE: I, Harsh Alberto, am scribing for, and in the presence of, Alphonse Boothe III, MD. I have scribed the entire note.     Subjective:       Patient ID: Froilan Ray is a 79 y.o. female.    Chief Complaint: Annual Exam    Ms. Froilan Ray is here for an annual after her last appointment on 9/29/2023. Use of walker as ambulatory aid. Accompanied by her . Prior hip infection. She saw Dr. Morfin for infectious disease. The patient reports experiencing left hip and lower back pain. She states that the pain is worse when she tries to bear any weight on her left leg. The patient had been using percocet for the pain. She uses 160 percocet per month. The patient is also taking 200 mg Lyrica. BMI of 21.78. Cardiovascular good. No chest pain. No palpitations. Blood pressure is 128/82. Hypertension controlled. On seizure medication. Paroxysmal a-fib. Not taking her blood thinners. CHFpEF ok. Osteoporosis. Seizure disorder for 5 years. Discontinued Keppra. Neuropathy. She states that her feet burn.     Review of Systems   Constitutional:  Negative for chills and fever.   HENT:  Negative for congestion and sore throat.    Eyes:  Negative for visual disturbance.   Respiratory:  Negative for chest tightness and shortness of breath.    Cardiovascular:  Negative for chest pain.   Gastrointestinal:  Negative for nausea.   Endocrine: Negative for polydipsia and polyuria.   Genitourinary:  Negative for dysuria and flank pain.   Musculoskeletal:  Positive for arthralgias (Left hip) and back pain. Negative for neck pain and neck stiffness.   Skin:  Negative for rash.   Neurological:  Negative for weakness.   Hematological:  Does not bruise/bleed easily.   Psychiatric/Behavioral:  Negative for behavioral problems.    All other systems reviewed and are negative.      Objective:      Physical examination: Vital signs noted. No acute distress. No carotid bruit. Regular heart rate and rhythm. Lungs clear to  auscultation bilaterally. Abdomen bowel sounds positive soft and nontender. Extremities without edema. 2+ pedal pulses. The patient jumps when touching her left hip.  Jumps with even light touch on her lower back buttock area or the left hip.      Assessment:       1. Chronic pain with uncomplicated opioid dependence    2. Essential hypertension    3. Paroxysmal atrial fibrillation    4. Chronic heart failure with preserved ejection fraction    5. Uses walker    6. Left hip pain    7. Disease of biliary tract    8. BMI 21.0-21.9, adult    9. Abnormal finding of blood chemistry, unspecified    10. Vitamin B12 deficiency        Plan:       Chronic pain with uncomplicated opioid dependence    Essential hypertension  -     Sedimentation rate; Future; Expected date: 04/11/2025  -     C-Reactive Protein; Future; Expected date: 04/11/2025  -     CBC Auto Differential; Future; Expected date: 04/11/2025  -     Comprehensive Metabolic Panel; Future; Expected date: 04/11/2025  -     Lipid Panel; Future; Expected date: 04/11/2025  -     Hemoglobin A1C; Future; Expected date: 04/11/2025  -     TSH; Future; Expected date: 04/11/2025  -     Vitamin B12; Future; Expected date: 04/11/2025    Paroxysmal atrial fibrillation    Chronic heart failure with preserved ejection fraction    Uses walker    Left hip pain  -     X-Ray Hip 2 or 3 views Left with Pelvis when performed; Future; Expected date: 04/11/2025  -     X-Ray Lumbar Spine 5 View; Future; Expected date: 04/11/2025  -     Ambulatory referral/consult to Pain Clinic; Future; Expected date: 04/18/2025    Disease of biliary tract    BMI 21.0-21.9, adult  -     Sedimentation rate; Future; Expected date: 04/11/2025  -     C-Reactive Protein; Future; Expected date: 04/11/2025  -     CBC Auto Differential; Future; Expected date: 04/11/2025  -     Comprehensive Metabolic Panel; Future; Expected date: 04/11/2025  -     Lipid Panel; Future; Expected date: 04/11/2025  -     Hemoglobin  A1C; Future; Expected date: 04/11/2025  -     TSH; Future; Expected date: 04/11/2025  -     Vitamin B12; Future; Expected date: 04/11/2025    Abnormal finding of blood chemistry, unspecified  -     Hemoglobin A1C; Future; Expected date: 04/11/2025    Vitamin B12 deficiency  -     Vitamin B12; Future; Expected date: 04/11/2025    She has been discharged from her pain clinic.  Gave her the names of a couple of other pain clinics Dr. Bhatti and Dr. Fisher.  Will get x-rays of the left hip in the lumbar spine.  Due to the history of infection.  Will get sed rate CRP CBC.  Also CMP lipids A1c TSH and B12.  Heart failure seems to be okay.  No atrial fibrillation currently in no anticoagulation.  Hypertension is controlled.  No seizures for over 5 years since she has stopped her Keppra.  All care gaps addressed or discussed.     I, Alphonse Boothe III, MD, personally performed the services described in this documentation. All medical record entries made by the scribe were at my direction and in my presence. I have reviewed the chart and agree that the record reflects my personal performance and is accurate and complete.

## 2025-04-12 ENCOUNTER — DOCUMENTATION ONLY (OUTPATIENT)
Dept: FAMILY MEDICINE | Facility: CLINIC | Age: 80
End: 2025-04-12
Payer: MEDICARE

## 2025-04-12 ENCOUNTER — HOSPITAL ENCOUNTER (OUTPATIENT)
Facility: HOSPITAL | Age: 80
Discharge: HOME | End: 2025-04-13
Attending: EMERGENCY MEDICINE | Admitting: INTERNAL MEDICINE
Payer: MEDICARE

## 2025-04-12 ENCOUNTER — HOSPITAL ENCOUNTER (OUTPATIENT)
Dept: RADIOLOGY | Facility: HOSPITAL | Age: 80
Discharge: HOME | End: 2025-04-12
Attending: FAMILY MEDICINE
Payer: MEDICARE

## 2025-04-12 ENCOUNTER — RESULTS FOLLOW-UP (OUTPATIENT)
Dept: FAMILY MEDICINE | Facility: CLINIC | Age: 80
End: 2025-04-12

## 2025-04-12 DIAGNOSIS — D64.9 SYMPTOMATIC ANEMIA: Primary | ICD-10-CM

## 2025-04-12 DIAGNOSIS — M25.552 LEFT HIP PAIN: ICD-10-CM

## 2025-04-12 DIAGNOSIS — F11.20 UNCOMPLICATED OPIOID DEPENDENCE: Chronic | ICD-10-CM

## 2025-04-12 PROBLEM — D51.8 VITAMIN B12 DEFICIENCY (DIETARY) ANEMIA: Status: ACTIVE | Noted: 2025-04-12

## 2025-04-12 LAB
ABO + RH BLD: NORMAL
ABO + RH BLD: NORMAL
ABSOLUTE EOSINOPHIL (SMH): 0.09 K/UL
ABSOLUTE EOSINOPHIL (SMH): 0.12 K/UL
ABSOLUTE MONOCYTE (SMH): 0.32 K/UL (ref 0.3–1)
ABSOLUTE MONOCYTE (SMH): 0.48 K/UL (ref 0.3–1)
ABSOLUTE NEUTROPHIL COUNT (SMH): 5.2 K/UL (ref 1.8–7.7)
ABSOLUTE NEUTROPHIL COUNT (SMH): 5.9 K/UL (ref 1.8–7.7)
ALBUMIN SERPL-MCNC: 3.8 G/DL (ref 3.5–5.2)
ALP SERPL-CCNC: 74 UNIT/L (ref 55–135)
ALT SERPL-CCNC: 11 UNIT/L (ref 10–44)
ANION GAP (SMH): 7 MMOL/L (ref 8–16)
AST SERPL-CCNC: 14 UNIT/L (ref 10–40)
BASOPHILS # BLD AUTO: 0.02 K/UL
BASOPHILS # BLD AUTO: 0.05 K/UL
BASOPHILS NFR BLD AUTO: 0.3 %
BASOPHILS NFR BLD AUTO: 0.7 %
BILIRUB SERPL-MCNC: 0.3 MG/DL (ref 0.1–1)
BLD PROD TYP BPU: NORMAL
BLD PROD TYP BPU: NORMAL
BLOOD UNIT EXPIRATION DATE: NORMAL
BLOOD UNIT EXPIRATION DATE: NORMAL
BLOOD UNIT TYPE CODE: 1700
BLOOD UNIT TYPE CODE: 1700
BUN SERPL-MCNC: 24 MG/DL (ref 8–23)
CALCIUM SERPL-MCNC: 9 MG/DL (ref 8.7–10.5)
CHLORIDE SERPL-SCNC: 109 MMOL/L (ref 95–110)
CO2 SERPL-SCNC: 24 MMOL/L (ref 23–29)
CREAT SERPL-MCNC: 0.7 MG/DL (ref 0.5–1.4)
CROSSMATCH INTERPRETATION: NORMAL
CROSSMATCH INTERPRETATION: NORMAL
DISPENSE STATUS: NORMAL
DISPENSE STATUS: NORMAL
ERYTHROCYTE [DISTWIDTH] IN BLOOD BY AUTOMATED COUNT: 14.9 % (ref 11.5–14.5)
ERYTHROCYTE [DISTWIDTH] IN BLOOD BY AUTOMATED COUNT: 15.5 % (ref 11.5–14.5)
GFR SERPLBLD CREATININE-BSD FMLA CKD-EPI: >60 ML/MIN/1.73/M2
GLUCOSE SERPL-MCNC: 103 MG/DL (ref 70–110)
HCT VFR BLD AUTO: 25 % (ref 37–48.5)
HCT VFR BLD AUTO: 28 % (ref 37–48.5)
HGB BLD-MCNC: 7.2 GM/DL (ref 12–16)
HGB BLD-MCNC: 8.5 GM/DL (ref 12–16)
IMM GRANULOCYTES # BLD AUTO: 0.03 K/UL (ref 0–0.04)
IMM GRANULOCYTES # BLD AUTO: 0.03 K/UL (ref 0–0.04)
IMM GRANULOCYTES NFR BLD AUTO: 0.4 % (ref 0–0.5)
IMM GRANULOCYTES NFR BLD AUTO: 0.4 % (ref 0–0.5)
INDIRECT COOMBS: NORMAL
LYMPHOCYTES # BLD AUTO: 1.06 K/UL (ref 1–4.8)
LYMPHOCYTES # BLD AUTO: 1.36 K/UL (ref 1–4.8)
MCH RBC QN AUTO: 23.2 PG (ref 27–31)
MCH RBC QN AUTO: 25.1 PG (ref 27–31)
MCHC RBC AUTO-ENTMCNC: 28.8 G/DL (ref 32–36)
MCHC RBC AUTO-ENTMCNC: 30.4 G/DL (ref 32–36)
MCV RBC AUTO: 80 FL (ref 82–98)
MCV RBC AUTO: 83 FL (ref 82–98)
NUCLEATED RBC (/100WBC) (SMH): 0 /100 WBC
NUCLEATED RBC (/100WBC) (SMH): 0 /100 WBC
OHS QRS DURATION: 86 MS
OHS QTC CALCULATION: 417 MS
PLATELET # BLD AUTO: 311 K/UL (ref 150–450)
PLATELET # BLD AUTO: 386 K/UL (ref 150–450)
PMV BLD AUTO: 10 FL (ref 9.2–12.9)
PMV BLD AUTO: 9.4 FL (ref 9.2–12.9)
POTASSIUM SERPL-SCNC: 4.5 MMOL/L (ref 3.5–5.1)
PROT SERPL-MCNC: 6.9 GM/DL (ref 6–8.4)
RBC # BLD AUTO: 3.11 M/UL (ref 4–5.4)
RBC # BLD AUTO: 3.39 M/UL (ref 4–5.4)
RELATIVE EOSINOPHIL (SMH): 1.2 % (ref 0–8)
RELATIVE EOSINOPHIL (SMH): 1.7 % (ref 0–8)
RELATIVE LYMPHOCYTE (SMH): 14.2 % (ref 18–48)
RELATIVE LYMPHOCYTE (SMH): 18.8 % (ref 18–48)
RELATIVE MONOCYTE (SMH): 4.3 % (ref 4–15)
RELATIVE MONOCYTE (SMH): 6.6 % (ref 4–15)
RELATIVE NEUTROPHIL (SMH): 71.8 % (ref 38–73)
RELATIVE NEUTROPHIL (SMH): 79.6 % (ref 38–73)
RH BLD: NORMAL
SODIUM SERPL-SCNC: 140 MMOL/L (ref 136–145)
SPECIMEN OUTDATE: NORMAL
UNIT NUMBER: NORMAL
UNIT NUMBER: NORMAL
WBC # BLD AUTO: 7.22 K/UL (ref 3.9–12.7)
WBC # BLD AUTO: 7.45 K/UL (ref 3.9–12.7)

## 2025-04-12 PROCEDURE — 94761 N-INVAS EAR/PLS OXIMETRY MLT: CPT

## 2025-04-12 PROCEDURE — 86920 COMPATIBILITY TEST SPIN: CPT | Performed by: EMERGENCY MEDICINE

## 2025-04-12 PROCEDURE — 80053 COMPREHEN METABOLIC PANEL: CPT | Performed by: EMERGENCY MEDICINE

## 2025-04-12 PROCEDURE — 63600175 PHARM REV CODE 636 W HCPCS: Performed by: NURSE PRACTITIONER

## 2025-04-12 PROCEDURE — 85025 COMPLETE CBC W/AUTO DIFF WBC: CPT | Performed by: EMERGENCY MEDICINE

## 2025-04-12 PROCEDURE — 86900 BLOOD TYPING SEROLOGIC ABO: CPT | Performed by: EMERGENCY MEDICINE

## 2025-04-12 PROCEDURE — G0378 HOSPITAL OBSERVATION PER HR: HCPCS

## 2025-04-12 PROCEDURE — 96374 THER/PROPH/DIAG INJ IV PUSH: CPT

## 2025-04-12 PROCEDURE — 25000003 PHARM REV CODE 250: Performed by: NURSE PRACTITIONER

## 2025-04-12 PROCEDURE — 36430 TRANSFUSION BLD/BLD COMPNT: CPT

## 2025-04-12 PROCEDURE — 93005 ELECTROCARDIOGRAM TRACING: CPT | Performed by: GENERAL PRACTICE

## 2025-04-12 PROCEDURE — 87389 HIV-1 AG W/HIV-1&-2 AB AG IA: CPT | Performed by: EMERGENCY MEDICINE

## 2025-04-12 PROCEDURE — 63600175 PHARM REV CODE 636 W HCPCS: Mod: JZ | Performed by: STUDENT IN AN ORGANIZED HEALTH CARE EDUCATION/TRAINING PROGRAM

## 2025-04-12 PROCEDURE — 93010 ELECTROCARDIOGRAM REPORT: CPT | Mod: ,,, | Performed by: GENERAL PRACTICE

## 2025-04-12 PROCEDURE — 86803 HEPATITIS C AB TEST: CPT | Performed by: EMERGENCY MEDICINE

## 2025-04-12 PROCEDURE — 72110 X-RAY EXAM L-2 SPINE 4/>VWS: CPT | Mod: TC

## 2025-04-12 PROCEDURE — 25000003 PHARM REV CODE 250: Performed by: EMERGENCY MEDICINE

## 2025-04-12 PROCEDURE — P9016 RBC LEUKOCYTES REDUCED: HCPCS | Performed by: EMERGENCY MEDICINE

## 2025-04-12 PROCEDURE — 72110 X-RAY EXAM L-2 SPINE 4/>VWS: CPT | Mod: 26,,, | Performed by: RADIOLOGY

## 2025-04-12 PROCEDURE — 73502 X-RAY EXAM HIP UNI 2-3 VIEWS: CPT | Mod: 26,LT,, | Performed by: RADIOLOGY

## 2025-04-12 PROCEDURE — 36415 COLL VENOUS BLD VENIPUNCTURE: CPT | Performed by: INTERNAL MEDICINE

## 2025-04-12 PROCEDURE — 96375 TX/PRO/DX INJ NEW DRUG ADDON: CPT

## 2025-04-12 PROCEDURE — 73502 X-RAY EXAM HIP UNI 2-3 VIEWS: CPT | Mod: TC,LT

## 2025-04-12 PROCEDURE — 99285 EMERGENCY DEPT VISIT HI MDM: CPT | Mod: 25

## 2025-04-12 PROCEDURE — 85025 COMPLETE CBC W/AUTO DIFF WBC: CPT | Mod: 91 | Performed by: INTERNAL MEDICINE

## 2025-04-12 PROCEDURE — 96372 THER/PROPH/DIAG INJ SC/IM: CPT | Performed by: NURSE PRACTITIONER

## 2025-04-12 RX ORDER — ACETAMINOPHEN 325 MG/1
650 TABLET ORAL EVERY 4 HOURS PRN
Status: DISCONTINUED | OUTPATIENT
Start: 2025-04-12 | End: 2025-04-13 | Stop reason: HOSPADM

## 2025-04-12 RX ORDER — PANTOPRAZOLE SODIUM 40 MG/10ML
40 INJECTION, POWDER, LYOPHILIZED, FOR SOLUTION INTRAVENOUS 2 TIMES DAILY
Status: DISCONTINUED | OUTPATIENT
Start: 2025-04-12 | End: 2025-04-13 | Stop reason: HOSPADM

## 2025-04-12 RX ORDER — TALC
6 POWDER (GRAM) TOPICAL NIGHTLY PRN
Status: DISCONTINUED | OUTPATIENT
Start: 2025-04-12 | End: 2025-04-13 | Stop reason: HOSPADM

## 2025-04-12 RX ORDER — CYANOCOBALAMIN 1000 UG/ML
1000 INJECTION, SOLUTION INTRAMUSCULAR; SUBCUTANEOUS ONCE
Status: COMPLETED | OUTPATIENT
Start: 2025-04-12 | End: 2025-04-12

## 2025-04-12 RX ORDER — SODIUM CHLORIDE 0.9 % (FLUSH) 0.9 %
10 SYRINGE (ML) INJECTION
Status: DISCONTINUED | OUTPATIENT
Start: 2025-04-12 | End: 2025-04-13 | Stop reason: HOSPADM

## 2025-04-12 RX ORDER — HYDROCODONE BITARTRATE AND ACETAMINOPHEN 5; 325 MG/1; MG/1
1 TABLET ORAL EVERY 6 HOURS PRN
Refills: 0 | Status: DISCONTINUED | OUTPATIENT
Start: 2025-04-12 | End: 2025-04-13 | Stop reason: HOSPADM

## 2025-04-12 RX ORDER — LANOLIN ALCOHOL/MO/W.PET/CERES
800 CREAM (GRAM) TOPICAL
Status: DISCONTINUED | OUTPATIENT
Start: 2025-04-12 | End: 2025-04-13 | Stop reason: HOSPADM

## 2025-04-12 RX ORDER — HYDROCODONE BITARTRATE AND ACETAMINOPHEN 500; 5 MG/1; MG/1
TABLET ORAL
Status: DISCONTINUED | OUTPATIENT
Start: 2025-04-12 | End: 2025-04-13 | Stop reason: HOSPADM

## 2025-04-12 RX ORDER — DOCUSATE SODIUM 100 MG/1
100 CAPSULE, LIQUID FILLED ORAL DAILY PRN
Status: DISCONTINUED | OUTPATIENT
Start: 2025-04-12 | End: 2025-04-13 | Stop reason: HOSPADM

## 2025-04-12 RX ORDER — NALOXONE HCL 0.4 MG/ML
0.02 VIAL (ML) INJECTION
Status: DISCONTINUED | OUTPATIENT
Start: 2025-04-12 | End: 2025-04-13 | Stop reason: HOSPADM

## 2025-04-12 RX ORDER — HYDRALAZINE HYDROCHLORIDE 20 MG/ML
10 INJECTION INTRAMUSCULAR; INTRAVENOUS EVERY 4 HOURS PRN
Status: DISCONTINUED | OUTPATIENT
Start: 2025-04-12 | End: 2025-04-13 | Stop reason: HOSPADM

## 2025-04-12 RX ORDER — AMLODIPINE BESYLATE 5 MG/1
5 TABLET ORAL DAILY
Status: DISCONTINUED | OUTPATIENT
Start: 2025-04-12 | End: 2025-04-13 | Stop reason: HOSPADM

## 2025-04-12 RX ORDER — SODIUM,POTASSIUM PHOSPHATES 280-250MG
2 POWDER IN PACKET (EA) ORAL
Status: DISCONTINUED | OUTPATIENT
Start: 2025-04-12 | End: 2025-04-13 | Stop reason: HOSPADM

## 2025-04-12 RX ORDER — LISINOPRIL 10 MG/1
10 TABLET ORAL DAILY
Status: DISCONTINUED | OUTPATIENT
Start: 2025-04-12 | End: 2025-04-13 | Stop reason: HOSPADM

## 2025-04-12 RX ORDER — FAMOTIDINE 20 MG/1
20 TABLET, FILM COATED ORAL DAILY PRN
COMMUNITY

## 2025-04-12 RX ORDER — ONDANSETRON HYDROCHLORIDE 2 MG/ML
4 INJECTION, SOLUTION INTRAVENOUS EVERY 6 HOURS PRN
Status: DISCONTINUED | OUTPATIENT
Start: 2025-04-12 | End: 2025-04-13 | Stop reason: HOSPADM

## 2025-04-12 RX ORDER — CYCLOBENZAPRINE HCL 10 MG
10 TABLET ORAL 3 TIMES DAILY PRN
Status: DISCONTINUED | OUTPATIENT
Start: 2025-04-12 | End: 2025-04-13 | Stop reason: HOSPADM

## 2025-04-12 RX ORDER — ALUMINUM HYDROXIDE, MAGNESIUM HYDROXIDE, AND SIMETHICONE 1200; 120; 1200 MG/30ML; MG/30ML; MG/30ML
15 SUSPENSION ORAL EVERY 6 HOURS PRN
Status: DISCONTINUED | OUTPATIENT
Start: 2025-04-12 | End: 2025-04-13 | Stop reason: HOSPADM

## 2025-04-12 RX ORDER — HYDROMORPHONE HYDROCHLORIDE 1 MG/ML
0.5 INJECTION, SOLUTION INTRAMUSCULAR; INTRAVENOUS; SUBCUTANEOUS EVERY 6 HOURS PRN
Status: DISCONTINUED | OUTPATIENT
Start: 2025-04-12 | End: 2025-04-13 | Stop reason: HOSPADM

## 2025-04-12 RX ADMIN — LISINOPRIL 10 MG: 2.5 TABLET ORAL at 01:04

## 2025-04-12 RX ADMIN — HYDROCODONE BITARTRATE AND ACETAMINOPHEN 1 TABLET: 5; 325 TABLET ORAL at 04:04

## 2025-04-12 RX ADMIN — SODIUM CHLORIDE: 9 INJECTION, SOLUTION INTRAVENOUS at 01:04

## 2025-04-12 RX ADMIN — PREGABALIN 200 MG: 75 CAPSULE ORAL at 08:04

## 2025-04-12 RX ADMIN — CYANOCOBALAMIN 1000 MCG: 1000 INJECTION INTRAMUSCULAR; SUBCUTANEOUS at 03:04

## 2025-04-12 RX ADMIN — CYCLOBENZAPRINE 10 MG: 10 TABLET, FILM COATED ORAL at 08:04

## 2025-04-12 RX ADMIN — PREGABALIN 200 MG: 75 CAPSULE ORAL at 01:04

## 2025-04-12 RX ADMIN — AMLODIPINE BESYLATE 5 MG: 5 TABLET ORAL at 01:04

## 2025-04-12 RX ADMIN — HYDROMORPHONE HYDROCHLORIDE 0.5 MG: 1 INJECTION, SOLUTION INTRAMUSCULAR; INTRAVENOUS; SUBCUTANEOUS at 09:04

## 2025-04-12 RX ADMIN — PANTOPRAZOLE SODIUM 40 MG: 40 INJECTION, POWDER, FOR SOLUTION INTRAVENOUS at 01:04

## 2025-04-12 NOTE — H&P
[facility] - Emergency Dept  Hospital Medicine  History & Physical    Patient Name: Froilan Ray  MRN: [id number]  Patient Class: OP- Observation  Admission Date: 4/12/2025  Attending Physician: Chester Riojas MD   Primary Care Provider: Alphonse Boothe III, MD         Patient information was obtained from patient, past medical records, and ER records.     Subjective:     Principal Problem:Symptomatic anemia    Chief Complaint:   Chief Complaint   Patient presents with    Abnormal Lab     Low H&H , 6.6 & 23 on blood work today. Endorses weakness and shortness of breath on exertion        HPI: 79 year old female with a past medical history of hypertension, IBS, seizures, diverticulitis, bleeding ulcers who presents to the emergency room for abnormal lab. Reports weakness and shortness of breath with exertion. Denies chest pain, focal weakness, N/V/D, abdominal pain, dysuria.     In the ER, H/H 6.6/23 with repeat 7.2/25, CMP with mildly elevated BUN, Vitamin B12 148, TSH 0.127, Free T4 0.85  EKG with no signs of ischemic changes, Left hip xaray with left hip arthroplasty, stable left acetabular protrusio    Of Note:   9/26/24: MRCP: with dilated common hepatic and common bile ducts, with truncated appearance of the distal common bile duct at the level of the ampulla and a possible duodenal mass,   09/2024: EGD and EUS showed erosive gastritis and multiple shallow ulcerations in duodenal bulb without any mass   11/2024: Colonoscopy: Scattered small-mouthed diverticula were found in the entire colon.    A patchy area of moderately altered vascular, atrophic, eroded,granular and scattered ulcerated mucosa was found in the sigmoid colon, in the descending colon and in the transverse colon (Appears to be healing).   Signficant rectal prolapse with diffuse irritation able to be reduced under anesthesia.   Biopsy : ACTIVE COLITIS WITH ULCERATION AND FIBRINOPURULENT DEBRIS.   11/2024: 2 units PRBC  transfused    Admit to ER for symptomatic anemia, transfuse 2 U pRBC    Past Medical History:   Diagnosis Date    Anemia     Arthritis     Bleeding ulcer 07/2016    Chronic pain     DDD (degenerative disc disease), cervical     DDD (degenerative disc disease), lumbar     Depression     Disc degeneration, lumbosacral     Diverticulitis     Encounter for blood transfusion     Hypertension     IBS (irritable bowel syndrome)     Neuropathy of both feet     Seizures     none  since 2017    Umbilical hernia 2020       Past Surgical History:   Procedure Laterality Date    ARTHROPLASTY, HIP, GIRDLESTONE, POSTERIOR APPROACH Left 1/19/2024    Procedure: ARTHROPLASTY, HIP, GIRDLESTONE, POSTERIOR APPROACH;  Surgeon: Bulmaro Tellez MD;  [facility];  Service: Orthopedics;  Laterality: Left;    ARTHROPLASTY, HIP, GIRDLESTONE, POSTERIOR APPROACH Left 1/25/2024    Procedure: Irrigation and debridement left hip,;  Surgeon: Juanito Painting MD;  [facility];  Service: Orthopedics;  Laterality: Left;    ARTHROPLASTY, HIP, GIRDLESTONE, POSTERIOR APPROACH Left 2/15/2024    Procedure: Revision hip antibiotic spacer head exchange, left, lateral, peg board, depuy osteomed in room, vanc, ancef, txa,;  Surgeon: Bulmaro Tellez MD;  [facility];  Service: Orthopedics;  Laterality: Left;    BACK SURGERY      BREAST BIOPSY      BREAST SURGERY Right     lumpectomy    CAUDAL EPIDURAL STEROID INJECTION N/A 01/28/2022    Procedure: Injection-steroid-epidural-caudal;  Surgeon: Luzmaria Marcelino MD;  Location: [facility] OR;  Service: Pain Management;  Laterality: N/A;    COLONOSCOPY N/A 01/14/2022    Procedure: COLONOSCOPY;  Surgeon: Abby Landers MD;  Location: [facility] ENDO;  Service: Endoscopy;  Laterality: N/A;    COLONOSCOPY N/A 11/30/2024    Procedure: COLONOSCOPY;  Surgeon: Marcio Nguyễn III, MD;  Location: [facility] ENDO;  Service: Endoscopy;  Laterality: N/A;    ENDOSCOPIC ULTRASOUND OF  UPPER GASTROINTESTINAL TRACT N/A 07/21/2020    Procedure: ULTRASOUND, UPPER GI TRACT, ENDOSCOPIC;  Surgeon: Marcio Nguyễn III, MD;  Location: [facility] ENDO;  Service: Endoscopy;  Laterality: N/A;    ENDOSCOPIC ULTRASOUND OF UPPER GASTROINTESTINAL TRACT N/A 9/27/2024    Procedure: ULTRASOUND, UPPER GI TRACT, ENDOSCOPIC;  Surgeon: Marcio Nguyễn III, MD;  Location: [facility] ENDO;  Service: Endoscopy;  Laterality: N/A;    ESOPHAGOGASTRODUODENOSCOPY N/A 01/14/2022    Procedure: EGD (ESOPHAGOGASTRODUODENOSCOPY);  Surgeon: Abby Landers MD;  Location: [facility] ENDO;  Service: Endoscopy;  Laterality: N/A;    ESOPHAGOGASTRODUODENOSCOPY N/A 06/10/2022    Procedure: EGD (ESOPHAGOGASTRODUODENOSCOPY);  Surgeon: Abby Landers MD;  Location: [facility] ENDO;  Service: Endoscopy;  Laterality: N/A;    EYE SURGERY      cataract    FLAP GRAFT, LOCAL Left 2/15/2024    Procedure: FLAP GRAFT, LOCAL;  Surgeon: Bulmaro Tellez MD;  Location: [facility] OR 2ND FLR;  Service: Orthopedics;  Laterality: Left;    HYSTERECTOMY      INTRAMEDULLARY RODDING OF TROCHANTER OF FEMUR Left 12/11/2018    Procedure: INSERTION, INTRAMEDULLARY SANTOS, FEMUR, TROCHANTER;  Surgeon: Eulalio De La Cruz MD;  Location: [facility] OR;  Service: Orthopedics;  Laterality: Left;    IRRIGATION AND DEBRIDEMENT OF LOWER EXTREMITY Left 1/19/2024    Procedure: IRRIGATION AND DEBRIDEMENT, LOWER EXTREMITY;  Surgeon: Bulmaro Tellez MD;  Location: [facility] OR 2ND FLR;  Service: Orthopedics;  Laterality: Left;    IRRIGATION AND DEBRIDEMENT OF LOWER EXTREMITY Left 2/15/2024    Procedure: IRRIGATION AND DEBRIDEMENT, LOWER EXTREMITY;  Surgeon: Bulmaro Tellez MD;  Location: [facility] OR 2ND FLR;  Service: Orthopedics;  Laterality: Left;    LAPAROSCOPIC CHOLECYSTECTOMY N/A 9/8/2024    Procedure: CHOLECYSTECTOMY, LAPAROSCOPIC;  Surgeon: Cipriano Ambrosio MD;  Location: [facility] OR;  Service: General;  Laterality: N/A;    REPAIR OF EPIGASTRIC HERNIA N/A 12/7/2023    Procedure: REPAIR, HERNIA,  EPIGASTRIC;  Surgeon: Vipul Escobar MD;  Location: [facility];  Service: General;  Laterality: N/A;    SPINAL CORD STIMULATOR IMPLANT  09/18/2013    and removal    SPINE SURGERY  2006    lumbar L2-S1 decompression.    SPINE SURGERY      cervical decompression    TONSILLECTOMY         Review of patient's allergies indicates:  No Known Allergies    No current facility-administered medications on file prior to encounter.     Current Outpatient Medications on File Prior to Encounter   Medication Sig    acetaminophen (TYLENOL) 325 MG tablet Take 2 tablets (650 mg total) by mouth every 8 (eight) hours as needed for Pain (pain scale 1-4).    amlodipine-benazepril 5-20 mg (LOTREL) 5-20 mg per capsule Take 1 capsule by mouth once daily.    cyclobenzaprine (FLEXERIL) 10 MG tablet Take 1 tablet (10 mg total) by mouth 3 (three) times daily as needed for Muscle spasms.    docusate sodium (COLACE) 100 MG capsule Take 1 capsule (100 mg total) by mouth 2 (two) times daily. (Patient taking differently: Take 100 mg by mouth daily as needed for Constipation.)    famotidine (PEPCID) 20 MG tablet Take 20 mg by mouth daily as needed for Heartburn.    oxyCODONE-acetaminophen (PERCOCET)  mg per tablet Take 1 tablet by mouth every 4 (four) hours as needed for Pain.    pregabalin (LYRICA) 200 MG Cap Take 1 capsule (200 mg total) by mouth 3 (three) times daily.    levETIRAcetam (KEPPRA) 500 MG Tab Take 1 tablet (500 mg total) by mouth 2 (two) times daily. (Patient not taking: Reported on 11/27/2024)    [DISCONTINUED] calcium carbonate (TUMS) 200 mg calcium (500 mg) chewable tablet Chew and swallow 2 tablets (1,000 mg total) by mouth once daily. (Patient not taking: Reported on 4/11/2025)    [DISCONTINUED] doxycycline (VIBRAMYCIN) 100 MG Cap Take 100 mg by mouth every 12 (twelve) hours. (Patient not taking: Reported on 4/11/2025)    [DISCONTINUED] pantoprazole (PROTONIX) 40 MG tablet Take 1 tablet (40 mg total) by mouth 2 (two) times  daily.     Family History       Problem Relation (Age of Onset)    COPD Brother    Coronary artery disease Father    Depression Father    Diabetes Mother, Father, Sister, Brother    Heart disease Father    Hypertension Mother, Father    Irritable bowel syndrome Mother          Tobacco Use    Smoking status: Never    Smokeless tobacco: Never   Substance and Sexual Activity    Alcohol use: No    Drug use: No    Sexual activity: Not Currently     Review of Systems   Constitutional: Negative.    Respiratory:  Positive for shortness of breath. Negative for cough, chest tightness and wheezing.    Cardiovascular: Negative.    Gastrointestinal: Negative.    Genitourinary: Negative.    Musculoskeletal: Negative.    Skin:  Positive for pallor.   Neurological:  Positive for weakness.     Objective:     Vital Signs (Most Recent):  Temp: 97.7 °F (36.5 °C) (04/12/25 1400)  Pulse: 75 (04/12/25 1400)  Resp: (!) 23 (04/12/25 1400)  BP: (!) 148/79 (04/12/25 1400)  SpO2: 98 % (04/12/25 1400) Vital Signs (24h Range):  Temp:  [97.7 °F (36.5 °C)] 97.7 °F (36.5 °C)  Pulse:  [74-78] 75  Resp:  [20-24] 23  SpO2:  [96 %-100 %] 98 %  BP: (143-162)/(73-81) 148/79     Weight: 60.8 kg (134 lb)  Body mass index is 21.63 kg/m².     Physical Exam  Vitals reviewed.   Constitutional:       General: She is not in acute distress.     Appearance: She is not ill-appearing.   HENT:      Head: Normocephalic and atraumatic.      Mouth/Throat:      Mouth: Mucous membranes are dry.   Eyes:      Extraocular Movements: Extraocular movements intact.      Pupils: Pupils are equal, round, and reactive to light.   Cardiovascular:      Rate and Rhythm: Normal rate and regular rhythm.   Pulmonary:      Effort: Pulmonary effort is normal. No respiratory distress.      Breath sounds: Normal breath sounds.   Abdominal:      General: Bowel sounds are normal.      Palpations: Abdomen is soft.      Tenderness: There is no abdominal tenderness.   Musculoskeletal:          "General: Normal range of motion.      Cervical back: Neck supple.   Skin:     General: Skin is warm.      Coloration: Skin is pale.   Neurological:      General: No focal deficit present.      Mental Status: She is alert.   Psychiatric:         Mood and Affect: Mood normal.              CRANIAL NERVES     CN III, IV, VI   Pupils are equal, round, and reactive to light.       Significant Labs: All pertinent labs within the past 24 hours have been reviewed.  CBC:   Recent Labs   Lab 04/12/25  1050 04/12/25  1215   WBC 6.59 7.45   HGB 6.6* 7.2*   HCT 23.3* 25.0*    386     CMP:   Recent Labs   Lab 04/12/25  1050 04/12/25  1215    140   K 4.4 4.5   CO2 23 24   BUN 27* 24*   CREATININE 0.8 0.7   CALCIUM 9.0 9.0   ALBUMIN 3.6 3.8   BILITOT 0.2 0.3   ALKPHOS 70 74   AST 12 14   ALT 10 11     Coagulation: No results for input(s): "PT", "INR", "APTT" in the last 48 hours.  Magnesium: No results for input(s): "MG" in the last 48 hours.  POCT Glucose: No results for input(s): "POCTGLUCOSE" in the last 48 hours.  TSH:   Recent Labs   Lab 04/12/25  1050   TSH 0.127*     Urine Studies: No results for input(s): "COLORU", "APPEARANCEUA", "PHUR", "SPECGRAV", "PROTEINUA", "GLUCUA", "KETONESU", "BILIRUBINUA", "OCCULTUA", "NITRITE", "UROBILINOGEN", "LEUKOCYTESUR", "RBCUA", "WBCUA", "BACTERIA", "SQUAMEPITHEL", "HYALINECASTS" in the last 48 hours.    Invalid input(s): "WRIGHTSUR"    Significant Imaging: I have reviewed all pertinent imaging results/findings within the past 24 hours.  Imaging Results    None         Assessment/Plan:     Assessment & Plan  Symptomatic anemia  Most recent hemoglobin and hematocrit are listed below.  Recent Labs     04/12/25  1050 04/12/25  1215   HGB 6.6* 7.2*   HCT 23.3* 25.0*     Plan  - Monitor serial CBC: Daily  - Transfuse PRBC if patient becomes hemodynamically unstable, symptomatic or H/H drops below 7/21.  - Patient has received 2 units of PRBCs on 11/2024  - Patient's anemia is " currently worsening. Will continue current treatment  -Vitamin B12 1000 mcg subcutaneous x1  Seizure disorder  Chronic  Patient stopped taking Keppra  Seizure precautions    Hypertension  Patient's blood pressure range in the last 24 hours was: BP  Min: 143/73  Max: 162/74.The patient's inpatient anti-hypertensive regimen is listed below:  Current Antihypertensives  amLODIPine tablet 5 mg, Daily, Oral  lisinopriL tablet 10 mg, Daily, Oral    Plan  - BP is controlled, no changes needed to their regimen      VTE Risk Mitigation (From admission, onward)           Ordered     IP VTE HIGH RISK PATIENT  Once         04/12/25 1413     Place sequential compression device  Until discontinued         04/12/25 1413                         On 04/12/2025, patient should be placed in hospital observation services under my care in collaboration with Dr. Riojas.           Selam Sanchez, NP  Department of Hospital Medicine  [facility] - Emergency Dept

## 2025-04-12 NOTE — SUBJECTIVE & OBJECTIVE
Past Medical History:   Diagnosis Date    Anemia     Arthritis     Bleeding ulcer 07/2016    Chronic pain     DDD (degenerative disc disease), cervical     DDD (degenerative disc disease), lumbar     Depression     Disc degeneration, lumbosacral     Diverticulitis     Encounter for blood transfusion     Hypertension     IBS (irritable bowel syndrome)     Neuropathy of both feet     Seizures     none  since 2017    Umbilical hernia 2020       Past Surgical History:   Procedure Laterality Date    ARTHROPLASTY, HIP, GIRDLESTONE, POSTERIOR APPROACH Left 1/19/2024    Procedure: ARTHROPLASTY, HIP, GIRDLESTONE, POSTERIOR APPROACH;  Surgeon: Bulmaro Tellez MD;  [facility];  Service: Orthopedics;  Laterality: Left;    ARTHROPLASTY, HIP, GIRDLESTONE, POSTERIOR APPROACH Left 1/25/2024    Procedure: Irrigation and debridement left hip,;  Surgeon: Juanito Painting MD;  [facility];  Service: Orthopedics;  Laterality: Left;    ARTHROPLASTY, HIP, GIRDLESTONE, POSTERIOR APPROACH Left 2/15/2024    Procedure: Revision hip antibiotic spacer head exchange, left, lateral, peg board, depuy osteomed in room, vanc, ancef, txa,;  Surgeon: Bulmaro Tellez MD;  [facility];  Service: Orthopedics;  Laterality: Left;    BACK SURGERY      BREAST BIOPSY      BREAST SURGERY Right     lumpectomy    CAUDAL EPIDURAL STEROID INJECTION N/A 01/28/2022    Procedure: Injection-steroid-epidural-caudal;  Surgeon: Luzmaria Marcelino MD;  Location: [facility] OR;  Service: Pain Management;  Laterality: N/A;    COLONOSCOPY N/A 01/14/2022    Procedure: COLONOSCOPY;  Surgeon: Abby Landers MD;  Location: [facility];  Service: Endoscopy;  Laterality: N/A;    COLONOSCOPY N/A 11/30/2024    Procedure: COLONOSCOPY;  Surgeon: Marcio Nguyễn III, MD;  Location: [facility] ENDO;  Service: Endoscopy;  Laterality: N/A;    ENDOSCOPIC ULTRASOUND OF UPPER GASTROINTESTINAL TRACT N/A 07/21/2020    Procedure: ULTRASOUND,  UPPER GI TRACT, ENDOSCOPIC;  Surgeon: Marcio Nguyễn III, MD;  Location: [facility] ENDO;  Service: Endoscopy;  Laterality: N/A;    ENDOSCOPIC ULTRASOUND OF UPPER GASTROINTESTINAL TRACT N/A 9/27/2024    Procedure: ULTRASOUND, UPPER GI TRACT, ENDOSCOPIC;  Surgeon: Marcio Nguyễn III, MD;  Location: [facility] ENDO;  Service: Endoscopy;  Laterality: N/A;    ESOPHAGOGASTRODUODENOSCOPY N/A 01/14/2022    Procedure: EGD (ESOPHAGOGASTRODUODENOSCOPY);  Surgeon: Abby Landers MD;  Location: [facility] ENDO;  Service: Endoscopy;  Laterality: N/A;    ESOPHAGOGASTRODUODENOSCOPY N/A 06/10/2022    Procedure: EGD (ESOPHAGOGASTRODUODENOSCOPY);  Surgeon: Abby Landers MD;  Location: [facility] ENDO;  Service: Endoscopy;  Laterality: N/A;    EYE SURGERY      cataract    FLAP GRAFT, LOCAL Left 2/15/2024    Procedure: FLAP GRAFT, LOCAL;  Surgeon: Bulmaro Tellez MD;  Location: [facility] OR 2ND FLR;  Service: Orthopedics;  Laterality: Left;    HYSTERECTOMY      INTRAMEDULLARY RODDING OF TROCHANTER OF FEMUR Left 12/11/2018    Procedure: INSERTION, INTRAMEDULLARY SANTOS, FEMUR, TROCHANTER;  Surgeon: Eulalio De La Cruz MD;  Location: [facility];  Service: Orthopedics;  Laterality: Left;    IRRIGATION AND DEBRIDEMENT OF LOWER EXTREMITY Left 1/19/2024    Procedure: IRRIGATION AND DEBRIDEMENT, LOWER EXTREMITY;  Surgeon: Bulmaro Tellez MD;  Location: [facility] OR 2ND FLR;  Service: Orthopedics;  Laterality: Left;    IRRIGATION AND DEBRIDEMENT OF LOWER EXTREMITY Left 2/15/2024    Procedure: IRRIGATION AND DEBRIDEMENT, LOWER EXTREMITY;  Surgeon: Bulmaro Tellez MD;  Location: [facility] OR 2ND FLR;  Service: Orthopedics;  Laterality: Left;    LAPAROSCOPIC CHOLECYSTECTOMY N/A 9/8/2024    Procedure: CHOLECYSTECTOMY, LAPAROSCOPIC;  Surgeon: Cipriano Ambrosio MD;  Location: [facility];  Service: General;  Laterality: N/A;    REPAIR OF EPIGASTRIC HERNIA N/A 12/7/2023    Procedure: REPAIR, HERNIA, EPIGASTRIC;  Surgeon: Vipul Escobar MD;  Location: [facility];   Service: General;  Laterality: N/A;    SPINAL CORD STIMULATOR IMPLANT  09/18/2013    and removal    SPINE SURGERY  2006    lumbar L2-S1 decompression.    SPINE SURGERY      cervical decompression    TONSILLECTOMY         Review of patient's allergies indicates:  No Known Allergies    No current facility-administered medications on file prior to encounter.     Current Outpatient Medications on File Prior to Encounter   Medication Sig    acetaminophen (TYLENOL) 325 MG tablet Take 2 tablets (650 mg total) by mouth every 8 (eight) hours as needed for Pain (pain scale 1-4).    amlodipine-benazepril 5-20 mg (LOTREL) 5-20 mg per capsule Take 1 capsule by mouth once daily.    cyclobenzaprine (FLEXERIL) 10 MG tablet Take 1 tablet (10 mg total) by mouth 3 (three) times daily as needed for Muscle spasms.    docusate sodium (COLACE) 100 MG capsule Take 1 capsule (100 mg total) by mouth 2 (two) times daily. (Patient taking differently: Take 100 mg by mouth daily as needed for Constipation.)    famotidine (PEPCID) 20 MG tablet Take 20 mg by mouth daily as needed for Heartburn.    oxyCODONE-acetaminophen (PERCOCET)  mg per tablet Take 1 tablet by mouth every 4 (four) hours as needed for Pain.    pregabalin (LYRICA) 200 MG Cap Take 1 capsule (200 mg total) by mouth 3 (three) times daily.    levETIRAcetam (KEPPRA) 500 MG Tab Take 1 tablet (500 mg total) by mouth 2 (two) times daily. (Patient not taking: Reported on 11/27/2024)    [DISCONTINUED] calcium carbonate (TUMS) 200 mg calcium (500 mg) chewable tablet Chew and swallow 2 tablets (1,000 mg total) by mouth once daily. (Patient not taking: Reported on 4/11/2025)    [DISCONTINUED] doxycycline (VIBRAMYCIN) 100 MG Cap Take 100 mg by mouth every 12 (twelve) hours. (Patient not taking: Reported on 4/11/2025)    [DISCONTINUED] pantoprazole (PROTONIX) 40 MG tablet Take 1 tablet (40 mg total) by mouth 2 (two) times daily.     Family History       Problem Relation (Age of Onset)     COPD Brother    Coronary artery disease Father    Depression Father    Diabetes Mother, Father, Sister, Brother    Heart disease Father    Hypertension Mother, Father    Irritable bowel syndrome Mother          Tobacco Use    Smoking status: Never    Smokeless tobacco: Never   Substance and Sexual Activity    Alcohol use: No    Drug use: No    Sexual activity: Not Currently     Review of Systems   Constitutional: Negative.    Respiratory:  Positive for shortness of breath. Negative for cough, chest tightness and wheezing.    Cardiovascular: Negative.    Gastrointestinal: Negative.    Genitourinary: Negative.    Musculoskeletal: Negative.    Skin:  Positive for pallor.   Neurological:  Positive for weakness.     Objective:     Vital Signs (Most Recent):  Temp: 97.7 °F (36.5 °C) (04/12/25 1400)  Pulse: 75 (04/12/25 1400)  Resp: (!) 23 (04/12/25 1400)  BP: (!) 148/79 (04/12/25 1400)  SpO2: 98 % (04/12/25 1400) Vital Signs (24h Range):  Temp:  [97.7 °F (36.5 °C)] 97.7 °F (36.5 °C)  Pulse:  [74-78] 75  Resp:  [20-24] 23  SpO2:  [96 %-100 %] 98 %  BP: (143-162)/(73-81) 148/79     Weight: 60.8 kg (134 lb)  Body mass index is 21.63 kg/m².     Physical Exam  Vitals reviewed.   Constitutional:       General: She is not in acute distress.     Appearance: She is not ill-appearing.   HENT:      Head: Normocephalic and atraumatic.      Mouth/Throat:      Mouth: Mucous membranes are dry.   Eyes:      Extraocular Movements: Extraocular movements intact.      Pupils: Pupils are equal, round, and reactive to light.   Cardiovascular:      Rate and Rhythm: Normal rate and regular rhythm.   Pulmonary:      Effort: Pulmonary effort is normal. No respiratory distress.      Breath sounds: Normal breath sounds.   Abdominal:      General: Bowel sounds are normal.      Palpations: Abdomen is soft.      Tenderness: There is no abdominal tenderness.   Musculoskeletal:         General: Normal range of motion.      Cervical back: Neck supple.  "  Skin:     General: Skin is warm.      Coloration: Skin is pale.   Neurological:      General: No focal deficit present.      Mental Status: She is alert.   Psychiatric:         Mood and Affect: Mood normal.              CRANIAL NERVES     CN III, IV, VI   Pupils are equal, round, and reactive to light.       Significant Labs: All pertinent labs within the past 24 hours have been reviewed.  CBC:   Recent Labs   Lab 04/12/25  1050 04/12/25  1215   WBC 6.59 7.45   HGB 6.6* 7.2*   HCT 23.3* 25.0*    386     CMP:   Recent Labs   Lab 04/12/25  1050 04/12/25  1215    140   K 4.4 4.5   CO2 23 24   BUN 27* 24*   CREATININE 0.8 0.7   CALCIUM 9.0 9.0   ALBUMIN 3.6 3.8   BILITOT 0.2 0.3   ALKPHOS 70 74   AST 12 14   ALT 10 11     Coagulation: No results for input(s): "PT", "INR", "APTT" in the last 48 hours.  Magnesium: No results for input(s): "MG" in the last 48 hours.  POCT Glucose: No results for input(s): "POCTGLUCOSE" in the last 48 hours.  TSH:   Recent Labs   Lab 04/12/25  1050   TSH 0.127*     Urine Studies: No results for input(s): "COLORU", "APPEARANCEUA", "PHUR", "SPECGRAV", "PROTEINUA", "GLUCUA", "KETONESU", "BILIRUBINUA", "OCCULTUA", "NITRITE", "UROBILINOGEN", "LEUKOCYTESUR", "RBCUA", "WBCUA", "BACTERIA", "SQUAMEPITHEL", "HYALINECASTS" in the last 48 hours.    Invalid input(s): "WRIGHTSUR"    Significant Imaging: I have reviewed all pertinent imaging results/findings within the past 24 hours.  Imaging Results    None         "

## 2025-04-12 NOTE — ASSESSMENT & PLAN NOTE
Patient's blood pressure range in the last 24 hours was: BP  Min: 143/73  Max: 162/74.The patient's inpatient anti-hypertensive regimen is listed below:  Current Antihypertensives  amLODIPine tablet 5 mg, Daily, Oral  lisinopriL tablet 10 mg, Daily, Oral    Plan  - BP is controlled, no changes needed to their regimen

## 2025-04-12 NOTE — ASSESSMENT & PLAN NOTE
Most recent hemoglobin and hematocrit are listed below.  Recent Labs     04/12/25  1050 04/12/25  1215   HGB 6.6* 7.2*   HCT 23.3* 25.0*     Plan  - Monitor serial CBC: Daily  - Transfuse PRBC if patient becomes hemodynamically unstable, symptomatic or H/H drops below 7/21.  - Patient has received 2 units of PRBCs on 11/2024  - Patient's anemia is currently worsening. Will continue current treatment  -Vitamin B12 1000 mcg subcutaneous x1

## 2025-04-12 NOTE — ED PROVIDER NOTES
Encounter Date: 4/12/2025       History     Chief Complaint   Patient presents with    Abnormal Lab     Low H&H , 6.6 & 23 on blood work today. Endorses weakness and shortness of breath on exertion     79-year-old female presented emergency department sent by primary care provider as she had labs recently and hemoglobin slow.  Patient is feeling weak and tired and that is the reason she had labs.  Patient has history of anemia in the past and needed blood transfusion.  Patient also has some shortness of breath with exertion along with feeling tired and weak.  Denies any dysuria or hematuria.  Denies any chest pain or abdominal pain.  Denies any focal weakness.  Patient states stool is normal colored and denies any GI bleeding.      Review of patient's allergies indicates:  No Known Allergies  Past Medical History:   Diagnosis Date    Anemia     Arthritis     Bleeding ulcer 07/2016    Chronic pain     DDD (degenerative disc disease), cervical     DDD (degenerative disc disease), lumbar     Depression     Disc degeneration, lumbosacral     Diverticulitis     Encounter for blood transfusion     Hypertension     IBS (irritable bowel syndrome)     Neuropathy of both feet     Seizures     none  since 2017    Umbilical hernia 2020     Past Surgical History:   Procedure Laterality Date    ARTHROPLASTY, HIP, GIRDLESTONE, POSTERIOR APPROACH Left 1/19/2024    Procedure: ARTHROPLASTY, HIP, GIRDLESTONE, POSTERIOR APPROACH;  Surgeon: Bulmaro Tellez MD;  Location: [facility] OR [address] [place] [address];  Service: Orthopedics;  Laterality: Left;    ARTHROPLASTY, HIP, GIRDLESTONE, POSTERIOR APPROACH Left 1/25/2024    Procedure: Irrigation and debridement left hip,;  Surgeon: Juanito Painting MD;  Location: [facility] OR [address] [place] [address];  Service: Orthopedics;  Laterality: Left;    ARTHROPLASTY, HIP, GIRDLESTONE, POSTERIOR APPROACH Left 2/15/2024    Procedure: Revision hip antibiotic spacer head exchange, left, lateral, peg board, depuy osteomed in room,  vanc, ancef, txa,;  Surgeon: Bulmaro Tellez MD;  Location: [facility] OR 2ND FLR;  Service: Orthopedics;  Laterality: Left;    BACK SURGERY      BREAST BIOPSY      BREAST SURGERY Right     lumpectomy    CAUDAL EPIDURAL STEROID INJECTION N/A 01/28/2022    Procedure: Injection-steroid-epidural-caudal;  Surgeon: Luzmaria Marcelino MD;  Location: [facility] OR;  Service: Pain Management;  Laterality: N/A;    COLONOSCOPY N/A 01/14/2022    Procedure: COLONOSCOPY;  Surgeon: Abby Landers MD;  Location: [facility] ENDO;  Service: Endoscopy;  Laterality: N/A;    COLONOSCOPY N/A 11/30/2024    Procedure: COLONOSCOPY;  Surgeon: Marcio Nguyễn III, MD;  Location: [facility] ENDO;  Service: Endoscopy;  Laterality: N/A;    ENDOSCOPIC ULTRASOUND OF UPPER GASTROINTESTINAL TRACT N/A 07/21/2020    Procedure: ULTRASOUND, UPPER GI TRACT, ENDOSCOPIC;  Surgeon: Marcio Nguyễn III, MD;  Location: [facility] ENDO;  Service: Endoscopy;  Laterality: N/A;    ENDOSCOPIC ULTRASOUND OF UPPER GASTROINTESTINAL TRACT N/A 9/27/2024    Procedure: ULTRASOUND, UPPER GI TRACT, ENDOSCOPIC;  Surgeon: Marcio Nguyễn III, MD;  Location: [facility] ENDO;  Service: Endoscopy;  Laterality: N/A;    ESOPHAGOGASTRODUODENOSCOPY N/A 01/14/2022    Procedure: EGD (ESOPHAGOGASTRODUODENOSCOPY);  Surgeon: Abby Landers MD;  Location: [facility] ENDO;  Service: Endoscopy;  Laterality: N/A;    ESOPHAGOGASTRODUODENOSCOPY N/A 06/10/2022    Procedure: EGD (ESOPHAGOGASTRODUODENOSCOPY);  Surgeon: Abby Landers MD;  Location: [facility] ENDO;  Service: Endoscopy;  Laterality: N/A;    EYE SURGERY      cataract    FLAP GRAFT, LOCAL Left 2/15/2024    Procedure: FLAP GRAFT, LOCAL;  Surgeon: Bulmaro Tellez MD;  Location: [facility] OR 2ND FLR;  Service: Orthopedics;  Laterality: Left;    HYSTERECTOMY      INTRAMEDULLARY RODDING OF TROCHANTER OF FEMUR Left 12/11/2018    Procedure: INSERTION, INTRAMEDULLARY SANTOS, FEMUR, TROCHANTER;  Surgeon: Eulalio De La Cruz MD;  Location: [facility] OR;  Service:  Orthopedics;  Laterality: Left;    IRRIGATION AND DEBRIDEMENT OF LOWER EXTREMITY Left 1/19/2024    Procedure: IRRIGATION AND DEBRIDEMENT, LOWER EXTREMITY;  Surgeon: Bulmaro Tellez MD;  Location: [facility] OR 2ND FLR;  Service: Orthopedics;  Laterality: Left;    IRRIGATION AND DEBRIDEMENT OF LOWER EXTREMITY Left 2/15/2024    Procedure: IRRIGATION AND DEBRIDEMENT, LOWER EXTREMITY;  Surgeon: Bulmaro Tellez MD;  Location: [facility] OR 2ND FLR;  Service: Orthopedics;  Laterality: Left;    LAPAROSCOPIC CHOLECYSTECTOMY N/A 9/8/2024    Procedure: CHOLECYSTECTOMY, LAPAROSCOPIC;  Surgeon: Cipriano Ambrosio MD;  Location: [facility] OR;  Service: General;  Laterality: N/A;    REPAIR OF EPIGASTRIC HERNIA N/A 12/7/2023    Procedure: REPAIR, HERNIA, EPIGASTRIC;  Surgeon: Vipul Escobar MD;  Location: [facility] OR;  Service: General;  Laterality: N/A;    SPINAL CORD STIMULATOR IMPLANT  09/18/2013    and removal    SPINE SURGERY  2006    lumbar L2-S1 decompression.    SPINE SURGERY      cervical decompression    TONSILLECTOMY       Family History   Problem Relation Name Age of Onset    Diabetes Mother      Hypertension Mother      Irritable bowel syndrome Mother      Diabetes Father          insulin dependent    Hypertension Father      Heart disease Father      Coronary artery disease Father      Depression Father      Diabetes Sister      Diabetes Brother      COPD Brother       Social History[1]  Review of Systems   Constitutional:  Positive for fatigue.   HENT: Negative.     Eyes: Negative.    Respiratory: Negative.     Cardiovascular:  Negative for chest pain.   Gastrointestinal: Negative.    Endocrine: Negative.    Genitourinary: Negative.    Musculoskeletal: Negative.    Skin: Negative.    Allergic/Immunologic: Negative.    Neurological:  Positive for weakness.   Hematological: Negative.    Psychiatric/Behavioral: Negative.     All other systems reviewed and are negative.      Physical Exam     Initial Vitals [04/12/25 1131]   BP  Pulse Resp Temp SpO2   (!) 151/81 78 20 97.7 °F (36.5 °C) 100 %      MAP       --         Physical Exam    Nursing note and vitals reviewed.  Constitutional: She appears well-developed and well-nourished.   HENT:   Head: Normocephalic and atraumatic.   Nose: Nose normal. Mouth/Throat: Oropharynx is clear and moist.   Eyes: Conjunctivae and EOM are normal. Pupils are equal, round, and reactive to light.   Neck: Neck supple. No thyromegaly present. No tracheal deviation present. No JVD present.   Normal range of motion.  Cardiovascular:  Normal rate, regular rhythm, normal heart sounds and intact distal pulses.           No murmur heard.  Pulmonary/Chest: Breath sounds normal. No stridor. No respiratory distress. She has no wheezes. She has no rales.   Abdominal: Abdomen is soft. Bowel sounds are normal. She exhibits distension. There is no abdominal tenderness.   Musculoskeletal:         General: No edema. Normal range of motion.      Cervical back: Normal range of motion and neck supple.     Neurological: She is alert and oriented to person, place, and time. She has normal strength. No cranial nerve deficit or sensory deficit. GCS score is 15. GCS eye subscore is 4. GCS verbal subscore is 5. GCS motor subscore is 6.   Skin: Skin is warm. Capillary refill takes less than 2 seconds.   Psychiatric: She has a normal mood and affect. Thought content normal.         ED Course   Procedures  Labs Reviewed   CBC WITH DIFFERENTIAL - Abnormal       Result Value    WBC 7.45      RBC 3.11 (*)     Hgb 7.2 (*)     Hct 25.0 (*)     MCV 80 (*)     MCH 23.2 (*)     MCHC 28.8 (*)     RDW 14.9 (*)     Platelet Count 386      MPV 10.0      Nucleated RBC 0      Neut % 79.6 (*)     Lymph % 14.2 (*)     Mono % 4.3      Eos % 1.2      Basophil % 0.3      Imm Grans % 0.4      Neut # 5.9      Lymph # 1.06      Mono # 0.32      Eos # 0.09      Baso # 0.02      Imm Grans # 0.03     CBC W/ AUTO DIFFERENTIAL    Narrative:     The following orders  were created for panel order CBC auto differential.  Procedure                               Abnormality         Status                     ---------                               -----------         ------                     CBC with Differential[[id number]]       Abnormal            Final result                 Please view results for these tests on the individual orders.   HEPATITIS C ANTIBODY   HIV 1 / 2 ANTIBODY   COMPREHENSIVE METABOLIC PANEL   OCCULT BLOOD X 1, STOOL   TYPE & SCREEN   PREPARE RBC SOFT     EKG Readings: (Independently Interpreted)   Initial Reading: No STEMI. Rhythm: Normal Sinus Rhythm. Ectopy: No Ectopy. Conduction: RBBB and Normal.     ECG Results              EKG 12-lead (In process)        Collection Time Result Time QRS Duration OHS QTC Calculation    04/12/25 11:59:37 04/12/25 12:02:11 86 417                     In process by Interface, Lab In [facility] (04/12/25 12:02:15)                   Narrative:    Test Reason : K92.2,    Vent. Rate :  75 BPM     Atrial Rate :  75 BPM     P-R Int : 168 ms          QRS Dur :  86 ms      QT Int : 374 ms       P-R-T Axes :  42  22  70 degrees    QTcB Int : 417 ms    Normal sinus rhythm  Normal ECG  When compared with ECG of 27-Nov-2024 21:06,  Nonspecific T wave abnormality no longer evident in Inferior leads    Referred By: AAAREFERRAL SELF           Confirmed By:                                   Imaging Results    None          Medications   0.9%  NaCl infusion (for blood administration) (has no administration in time range)     Medical Decision Making  79-year-old female with symptomatic anemia sent for blood transfusion.  Patient otherwise hemodynamically stable and agreed to receive blood transfusion.  Patient denied any GI bleeding and will evaluate for GI bleeding with the next stool sample she can give us.  Patient otherwise nontoxic and agreed to blood transfusion so blood transfusion ordered and Hospital Medicine consulted for evaluation  for further management and treatment.    Amount and/or Complexity of Data Reviewed  Labs: ordered. Decision-making details documented in ED Course.    Risk  Prescription drug management.  Decision regarding hospitalization.                                      Clinical Impression:  Final diagnoses:  [D64.9] Symptomatic anemia (Primary)          ED Disposition Condition    Admit Fair                  [1]   Social History  Tobacco Use    Smoking status: Never    Smokeless tobacco: Never   Substance Use Topics    Alcohol use: No    Drug use: No        Christiana Matos MD  04/12/25 1772

## 2025-04-12 NOTE — PLAN OF CARE
Problem: Adult Inpatient Plan of Care  Goal: Plan of Care Review  Outcome: Progressing  Goal: Patient-Specific Goal (Individualized)  Outcome: Progressing  Goal: Absence of Hospital-Acquired Illness or Injury  Outcome: Progressing  Goal: Optimal Comfort and Wellbeing  Outcome: Progressing  Goal: Readiness for Transition of Care  Outcome: Progressing     Problem: Acute Kidney Injury/Impairment  Goal: Fluid and Electrolyte Balance  Outcome: Progressing  Goal: Improved Oral Intake  Outcome: Progressing  Goal: Effective Renal Function  Outcome: Progressing      [address] ,  [address] [facility]  Phone: [phone]  [facility] [address], [address] [place] [address]  Phone: [phone]   [phone]    Colonoscopy Procedure Note    Patient: Tommy Borges  : 1969    Procedure: Screening Colonoscopy    Date:  2020     Endoscopist:  Adina Vazquez MD    Referring Physician:  JOB Donnelly CNP    Preoperative Diagnosis:  Screening Colon    Postoperative Diagnosis:  See impression    Anesthesia: Anesthesia: Moderate Sedation    Sedation: Versed 8 mg IV, fentanyl 100 mcg IV, Benedryl 50mg IV, Zofran 4mg IV  Start Time: 10:05  Stop Time: 10:23  ASA Class: 1  Mallampati: I (soft palate, uvula, fauces, tonsillar pillars visible)    Procedure Details  Informed consent was obtained for the procedure, including sedation. Risks of perforation, hemorrhage, adverse drug reaction and aspiration were discussed. The patient was placed in the left lateral decubitus position. Based on the pre-procedure assessment, including review of the patient's medical history, medications, allergies, and review of systems, she had been deemed to be an appropriate candidate for sedation; she was therefore sedated with the medications listed below. The patient was monitored continuously with ECG tracing, pulse oximetry, blood pressure monitoring, and direct observations. rectal examination was performed. There were no external hemorrhoids, fissures or skin tags. The colonoscope was inserted into the rectum and advanced under direct vision to the cecum, which was identified by the ileocecal valve and appendiceal orifice which were photographed. The right colon was examined twice as this increases polyp detection especially if other right colon polyps, older age, male, or borja syndrome. When segments could not be distended with CO2 or air, it was filled/distended with water.  Colonoscope was then slowly withdrawn. Each colonic segmentwas evaluated carefully. Care was taken to inspect the proximal aspects of the IC valve, haustral folds, as well as flexures. The quality of the colonic preparation was excellent. A careful inspection was made as the colonoscope was withdrawn, including a retroflexed view of the rectum; findings and interventions are described below. Appropriate photodocumentation Was Obtained. Findings: -normal colonic mucosa throughout  - PREP: miralax  - Overall difficulty: mild in degree  - Abdominal pressure: no  - Change in position: no  - Anesthesia issues: no  - Medivator use: no    Specimens: Was Not Obtained    Complications:   None; patient tolerated the procedure well. Disposition:   PACU - hemodynamically stable. Estimated Blood loss:  none    Withdrawal Time:  7 minutes    Impression:   -Normal colonoscopy to the cecum, with no evidence of neoplasia, diverticular disease, or mucosal abnormality. Recommendations:Screening colonoscopy 5 years.         Fawad Hall 12/7/20 10:27 AM EST

## 2025-04-12 NOTE — PROGRESS NOTES
Called and spoke to patient and son Aristeo. Endorses feeling weak. Discussed lab results and is heading to ER for further workup and treatment.

## 2025-04-12 NOTE — HPI
79 year old female with a past medical history of hypertension, IBS, seizures, diverticulitis, bleeding ulcers who presents to the emergency room for abnormal lab. Reports weakness and shortness of breath with exertion. Denies chest pain, focal weakness, N/V/D, abdominal pain, dysuria.     In the ER, H/H 6.6/23 with repeat 7.2/25, CMP with mildly elevated BUN, Vitamin B12 148, TSH 0.127, Free T4 0.85  EKG with no signs of ischemic changes, Left hip xaray with left hip arthroplasty, stable left acetabular protrusio    Of Note:   9/26/24: MRCP: with dilated common hepatic and common bile ducts, with truncated appearance of the distal common bile duct at the level of the ampulla and a possible duodenal mass,   09/2024: EGD and EUS showed erosive gastritis and multiple shallow ulcerations in duodenal bulb without any mass   11/2024: Colonoscopy: Scattered small-mouthed diverticula were found in the entire colon.    A patchy area of moderately altered vascular, atrophic, eroded,granular and scattered ulcerated mucosa was found in the sigmoid colon, in the descending colon and in the transverse colon (Appears to be healing).   Signficant rectal prolapse with diffuse irritation able to be reduced under anesthesia.   Biopsy : ACTIVE COLITIS WITH ULCERATION AND FIBRINOPURULENT DEBRIS.   11/2024: 2 units PRBC transfused    Admit to ER for symptomatic anemia, transfuse 2 U pRBC

## 2025-04-12 NOTE — PHARMACY MED REC
"Admission Medication History     The home medication history was taken by Talib Fernández.    You may go to "Admission" then "Reconcile Home Medications" tabs to review and/or act upon these items.     The home medication list has been updated by the Pharmacy department.   Please read ALL comments highlighted in yellow.   Please address this information as you see fit.    Feel free to contact us if you have any questions or require assistance.      The medications listed below were removed from the home medication list. Please reorder if appropriate:  Patient reports no longer taking the following medication(s):  Tums  Doxycycline 100 mg    Medications listed below were obtained from: Patient/family and Analytic software- [employer]  No current facility-administered medications on file prior to encounter.     Current Outpatient Medications on File Prior to Encounter   Medication Sig Dispense Refill    acetaminophen (TYLENOL) 325 MG tablet Take 2 tablets (650 mg total) by mouth every 8 (eight) hours as needed for Pain (pain scale 1-4).  0    amlodipine-benazepril 5-20 mg (LOTREL) 5-20 mg per capsule Take 1 capsule by mouth once daily. 90 capsule 3    cyclobenzaprine (FLEXERIL) 10 MG tablet Take 1 tablet (10 mg total) by mouth 3 (three) times daily as needed for Muscle spasms.      docusate sodium (COLACE) 100 MG capsule Take 1 capsule (100 mg total) by mouth 2 (two) times daily. (Patient taking differently: Take 100 mg by mouth daily as needed for Constipation.) 60 capsule 1    famotidine (PEPCID) 20 MG tablet Take 20 mg by mouth daily as needed for Heartburn.      oxyCODONE-acetaminophen (PERCOCET)  mg per tablet Take 1 tablet by mouth every 4 (four) hours as needed for Pain. 12 tablet 0    pregabalin (LYRICA) 200 MG Cap Take 1 capsule (200 mg total) by mouth 3 (three) times daily. 90 capsule 0    levETIRAcetam (KEPPRA) 500 MG Tab Take 1 tablet (500 mg total) by mouth 2 (two) times daily. (Patient not taking: " Reported on 11/27/2024) 180 tablet 1    [DISCONTINUED] calcium carbonate (TUMS) 200 mg calcium (500 mg) chewable tablet Chew and swallow 2 tablets (1,000 mg total) by mouth once daily. (Patient not taking: Reported on 4/11/2025) 60 tablet 11    [DISCONTINUED] doxycycline (VIBRAMYCIN) 100 MG Cap Take 100 mg by mouth every 12 (twelve) hours. (Patient not taking: Reported on 4/11/2025)      [DISCONTINUED] pantoprazole (PROTONIX) 40 MG tablet Take 1 tablet (40 mg total) by mouth 2 (two) times daily. 60 tablet 4           Talib Fernández  EXT 1919                .

## 2025-04-13 VITALS
RESPIRATION RATE: 18 BRPM | DIASTOLIC BLOOD PRESSURE: 76 MMHG | BODY MASS INDEX: 21.53 KG/M2 | SYSTOLIC BLOOD PRESSURE: 151 MMHG | HEART RATE: 71 BPM | HEIGHT: 66 IN | OXYGEN SATURATION: 97 % | WEIGHT: 134 LBS | TEMPERATURE: 98 F

## 2025-04-13 LAB
ALBUMIN SERPL-MCNC: 3.1 G/DL (ref 3.5–5.2)
ALP SERPL-CCNC: 61 UNIT/L (ref 55–135)
ALT SERPL-CCNC: 8 UNIT/L (ref 10–44)
ANION GAP (SMH): 7 MMOL/L (ref 8–16)
AST SERPL-CCNC: 9 UNIT/L (ref 10–40)
BILIRUB SERPL-MCNC: 0.4 MG/DL (ref 0.1–1)
BNP SERPL-MCNC: 107 PG/ML
BUN SERPL-MCNC: 24 MG/DL (ref 8–23)
CALCIUM SERPL-MCNC: 8.3 MG/DL (ref 8.7–10.5)
CHLORIDE SERPL-SCNC: 110 MMOL/L (ref 95–110)
CO2 SERPL-SCNC: 21 MMOL/L (ref 23–29)
CREAT SERPL-MCNC: 0.7 MG/DL (ref 0.5–1.4)
ERYTHROCYTE [DISTWIDTH] IN BLOOD BY AUTOMATED COUNT: 15.1 % (ref 11.5–14.5)
GFR SERPLBLD CREATININE-BSD FMLA CKD-EPI: >60 ML/MIN/1.73/M2
GLUCOSE SERPL-MCNC: 102 MG/DL (ref 70–110)
HCT VFR BLD AUTO: 29.2 % (ref 37–48.5)
HCV AB SERPL QL IA: NORMAL
HGB BLD-MCNC: 9 GM/DL (ref 12–16)
HIV 1+2 AB+HIV1 P24 AG SERPL QL IA: NORMAL
MCH RBC QN AUTO: 25.4 PG (ref 27–31)
MCHC RBC AUTO-ENTMCNC: 30.8 G/DL (ref 32–36)
MCV RBC AUTO: 82 FL (ref 82–98)
PLATELET # BLD AUTO: 356 K/UL (ref 150–450)
PMV BLD AUTO: 9.8 FL (ref 9.2–12.9)
POTASSIUM SERPL-SCNC: 4 MMOL/L (ref 3.5–5.1)
PROT SERPL-MCNC: 5.7 GM/DL (ref 6–8.4)
RBC # BLD AUTO: 3.55 M/UL (ref 4–5.4)
SODIUM SERPL-SCNC: 138 MMOL/L (ref 136–145)
WBC # BLD AUTO: 11.16 K/UL (ref 3.9–12.7)

## 2025-04-13 PROCEDURE — 96376 TX/PRO/DX INJ SAME DRUG ADON: CPT

## 2025-04-13 PROCEDURE — 82040 ASSAY OF SERUM ALBUMIN: CPT | Performed by: NURSE PRACTITIONER

## 2025-04-13 PROCEDURE — 83880 ASSAY OF NATRIURETIC PEPTIDE: CPT | Performed by: INTERNAL MEDICINE

## 2025-04-13 PROCEDURE — 36415 COLL VENOUS BLD VENIPUNCTURE: CPT | Performed by: NURSE PRACTITIONER

## 2025-04-13 PROCEDURE — 85027 COMPLETE CBC AUTOMATED: CPT | Performed by: NURSE PRACTITIONER

## 2025-04-13 PROCEDURE — 94761 N-INVAS EAR/PLS OXIMETRY MLT: CPT

## 2025-04-13 PROCEDURE — 36415 COLL VENOUS BLD VENIPUNCTURE: CPT | Performed by: INTERNAL MEDICINE

## 2025-04-13 PROCEDURE — 96372 THER/PROPH/DIAG INJ SC/IM: CPT | Performed by: NURSE PRACTITIONER

## 2025-04-13 PROCEDURE — 63600175 PHARM REV CODE 636 W HCPCS: Performed by: NURSE PRACTITIONER

## 2025-04-13 PROCEDURE — 25000003 PHARM REV CODE 250: Performed by: NURSE PRACTITIONER

## 2025-04-13 PROCEDURE — G0378 HOSPITAL OBSERVATION PER HR: HCPCS

## 2025-04-13 RX ORDER — CYANOCOBALAMIN 1000 UG/ML
200 INJECTION, SOLUTION INTRAMUSCULAR; SUBCUTANEOUS ONCE
Status: COMPLETED | OUTPATIENT
Start: 2025-04-13 | End: 2025-04-13

## 2025-04-13 RX ORDER — LANOLIN ALCOHOL/MO/W.PET/CERES
500 CREAM (GRAM) TOPICAL DAILY
Qty: 15 TABLET | Refills: 0 | Status: SHIPPED | OUTPATIENT
Start: 2025-04-13 | End: 2025-05-13

## 2025-04-13 RX ORDER — CYANOCOBALAMIN 1000 UG/ML
1000 INJECTION, SOLUTION INTRAMUSCULAR; SUBCUTANEOUS DAILY
Qty: 30 ML | Refills: 0 | Status: SHIPPED | OUTPATIENT
Start: 2025-04-13 | End: 2025-05-13

## 2025-04-13 RX ADMIN — PREGABALIN 200 MG: 75 CAPSULE ORAL at 08:04

## 2025-04-13 RX ADMIN — CYANOCOBALAMIN 200 MCG: 1000 INJECTION INTRAMUSCULAR; SUBCUTANEOUS at 10:04

## 2025-04-13 RX ADMIN — AMLODIPINE BESYLATE 5 MG: 5 TABLET ORAL at 08:04

## 2025-04-13 RX ADMIN — LISINOPRIL 10 MG: 2.5 TABLET ORAL at 08:04

## 2025-04-13 RX ADMIN — Medication 6 MG: at 01:04

## 2025-04-13 RX ADMIN — PANTOPRAZOLE SODIUM 40 MG: 40 INJECTION, POWDER, FOR SOLUTION INTRAVENOUS at 01:04

## 2025-04-13 NOTE — RESPIRATORY THERAPY
04/13/25 0830   Patient Assessment/Suction   Level of Consciousness (AVPU) alert   Respiratory Effort Normal;Unlabored   Expansion/Accessory Muscles/Retractions expansion symmetric   Rhythm/Pattern, Respiratory unlabored;pattern regular;depth regular;no shortness of breath reported   PRE-TX-O2   Device (Oxygen Therapy) room air   SpO2 97 %   Pulse Oximetry Type Intermittent   $ Pulse Oximetry - Multiple Charge Pulse Oximetry - Multiple   Pulse 71   Resp 18

## 2025-04-13 NOTE — PLAN OF CARE
[facility]  Initial Discharge Assessment       Primary Care Provider: Alphonse Boothe III, MD    Admission Diagnosis: Symptomatic anemia [D64.9]    Admission Date: 4/12/2025  Expected Discharge Date: 4/13/2025  Pt is a 79-year-old female who arrived from home with Symptomatic anemia problem. Information verified as correct on facesheet. The assessment was completed at the patient's bedside.  Pt lives with Otoniel Ray (Spouse) [phone] (Mobile). Pt does not have a Living Will or Advance Directive. The Pt is oriented to person, places, and times. PCP last seen 4/11/25.  Pt denies Coumadin, dialysis, DME, HH, and has not been readmitted into the hospital in the last thirty days.  Pt is capable of performing ADLs with some assistance. Pt spouse drives her to and from medical appointments. Pt reports she takes medication as prescribed; [facility] [address].  Pt verbalized plan to discharge home via family transport. Pt has no other needs to be addressed at this time. CM reviewed the chart and will continue to monitor.         Payor: MEDICARE / Plan: MEDICARE PART A & B / Product Type: Government /     Extended Emergency Contact Information  Primary Emergency Contact: Otoniel Ray  Address: [id number] [place] [address] [id number] [place] [address]  Mobile Phone: [phone]  Relation: Spouse  Secondary Emergency Contact: SAHRA MCGUIRE  Mobile Phone: [phone]  Relation: Son    Discharge Plan A: Home with family  Discharge Plan B: Home with family      [id number] [place] - [address]  [address]  [place] [address]  Phone: [phone] Fax: [phone]      Initial Assessment (most recent)       Adult Discharge Assessment - 04/13/25 1110          Discharge Assessment    Assessment Type Discharge Planning Assessment     Confirmed/corrected address, phone number and insurance Yes     Confirmed Demographics Correct on Facesheet     Source of  Information patient     When was your last doctors appointment? 04/11/25     Does patient/caregiver understand observation status Yes     Reason For Admission Symptomatic anemia     People in Home spouse     Do you expect to return to your current living situation? Yes     Do you have help at home or someone to help you manage your care at home? Yes     Who are your caregiver(s) and their phone number(s)? Otoniel Ray (Spouse)  [phone] (Mobile)     Walking or Climbing Stairs Difficulty yes     Walking or Climbing Stairs ambulation difficulty, requires equipment     Mobility Management Pt has a mobile device to get her upstairs bhumika her bedroom.     Dressing/Bathing Difficulty no     Home Accessibility wheelchair accessible     Home Layout Able to live on 1st floor     Equipment Currently Used at Home bedside commode;wheelchair;walker, standard;shower chair     Readmission within 30 days? No     Do you currently have service(s) that help you manage your care at home? No     Do you take prescription medications? Yes     Do you have prescription coverage? Yes     Coverage MEDICARE - MEDICARE PART A & B     Do you have any problems affording any of your prescribed medications? No     Is the patient taking medications as prescribed? yes     Who is going to help you get home at discharge? Otoniel Ray (Spouse)  [phone] (Mobile)     How do you get to doctors appointments? family or friend will provide     Are you on dialysis? No     Do you take coumadin? No     Discharge Plan A Home with family     Discharge Plan B Home with family     DME Needed Upon Discharge  none

## 2025-04-13 NOTE — PLAN OF CARE
Patient cleared for discharge from case management standpoint.    Chart and discharge orders reviewed.  Patient discharged home with no further case management needs.       04/13/25 1132   Final Note   Assessment Type Final Discharge Note   Anticipated Discharge Disposition Home   What phone number can be called within the next 1-3 days to see how you are doing after discharge? [phone]   Post-Acute Status   Discharge Delays None known at this time

## 2025-04-14 ENCOUNTER — TELEPHONE (OUTPATIENT)
Dept: FAMILY MEDICINE | Facility: CLINIC | Age: 80
End: 2025-04-14
Payer: MEDICARE

## 2025-04-14 NOTE — TELEPHONE ENCOUNTER
----- Message from Radha sent at 4/14/2025  2:42 PM CDT -----  Contact: self   Type:  Sooner Appointment RequestCaller is requesting a sooner appointment.  Caller declined first available appointment listed below.  Caller will not accept being placed on the waitlist and is requesting a message be sent to doctor.Name of Caller:  the patientWhen is the first available appointment?  4/28Date of discharge:  4/13Would the patient rather a call back or a response via [facility]? callBest Call Back Number:  [phone] Information:  pt calling to see if anyone can see her or does she only see [facility] for Hospital F/U please call

## 2025-04-22 ENCOUNTER — RESULTS FOLLOW-UP (OUTPATIENT)
Dept: FAMILY MEDICINE | Facility: CLINIC | Age: 80
End: 2025-04-22

## 2025-04-22 ENCOUNTER — OFFICE VISIT (OUTPATIENT)
Dept: FAMILY MEDICINE | Facility: CLINIC | Age: 80
End: 2025-04-22
Payer: MEDICARE

## 2025-04-22 ENCOUNTER — LAB VISIT (OUTPATIENT)
Dept: LAB | Facility: HOSPITAL | Age: 80
End: 2025-04-22
Payer: MEDICARE

## 2025-04-22 ENCOUNTER — TELEPHONE (OUTPATIENT)
Dept: FAMILY MEDICINE | Facility: CLINIC | Age: 80
End: 2025-04-22
Payer: MEDICARE

## 2025-04-22 ENCOUNTER — HOSPITAL ENCOUNTER (INPATIENT)
Facility: HOSPITAL | Age: 80
LOS: 2 days | Discharge: HOME | DRG: 812 | End: 2025-04-24
Attending: EMERGENCY MEDICINE | Admitting: INTERNAL MEDICINE
Payer: MEDICARE

## 2025-04-22 VITALS
DIASTOLIC BLOOD PRESSURE: 78 MMHG | SYSTOLIC BLOOD PRESSURE: 168 MMHG | OXYGEN SATURATION: 97 % | HEIGHT: 66 IN | HEART RATE: 91 BPM | TEMPERATURE: 99 F | WEIGHT: 134.81 LBS | BODY MASS INDEX: 21.67 KG/M2

## 2025-04-22 DIAGNOSIS — R53.1 WEAKNESS: ICD-10-CM

## 2025-04-22 DIAGNOSIS — N39.0 URINARY TRACT INFECTION WITHOUT HEMATURIA, SITE UNSPECIFIED: Primary | ICD-10-CM

## 2025-04-22 DIAGNOSIS — F11.20 UNCOMPLICATED OPIOID DEPENDENCE: Chronic | ICD-10-CM

## 2025-04-22 DIAGNOSIS — M25.552 LEFT HIP PAIN: ICD-10-CM

## 2025-04-22 DIAGNOSIS — Z99.89 USES WALKER: ICD-10-CM

## 2025-04-22 DIAGNOSIS — R39.15 URGENCY OF URINATION: ICD-10-CM

## 2025-04-22 DIAGNOSIS — K25.4 CHRONIC GASTRIC ULCER WITH BLEEDING: ICD-10-CM

## 2025-04-22 DIAGNOSIS — D64.9 ANEMIA, UNSPECIFIED TYPE: ICD-10-CM

## 2025-04-22 DIAGNOSIS — R31.9 HEMATURIA, UNSPECIFIED TYPE: ICD-10-CM

## 2025-04-22 DIAGNOSIS — D64.9 SYMPTOMATIC ANEMIA: ICD-10-CM

## 2025-04-22 DIAGNOSIS — K92.2 GASTROINTESTINAL HEMORRHAGE, UNSPECIFIED GASTROINTESTINAL HEMORRHAGE TYPE: ICD-10-CM

## 2025-04-22 DIAGNOSIS — Z09 HOSPITAL DISCHARGE FOLLOW-UP: Primary | ICD-10-CM

## 2025-04-22 DIAGNOSIS — D64.9 SYMPTOMATIC ANEMIA: Primary | ICD-10-CM

## 2025-04-22 DIAGNOSIS — R53.83 FATIGUE: ICD-10-CM

## 2025-04-22 LAB
ABSOLUTE EOSINOPHIL (SMH): 0.11 K/UL
ABSOLUTE EOSINOPHIL (SMH): 0.16 K/UL
ABSOLUTE MONOCYTE (SMH): 0.42 K/UL (ref 0.3–1)
ABSOLUTE MONOCYTE (SMH): 0.72 K/UL (ref 0.3–1)
ABSOLUTE NEUTROPHIL COUNT (SMH): 3.8 K/UL (ref 1.8–7.7)
ABSOLUTE NEUTROPHIL COUNT (SMH): 4.1 K/UL (ref 1.8–7.7)
ALBUMIN SERPL-MCNC: 3.7 G/DL (ref 3.5–5.2)
ALBUMIN SERPL-MCNC: 3.8 G/DL (ref 3.5–5.2)
ALP SERPL-CCNC: 72 UNIT/L (ref 55–135)
ALP SERPL-CCNC: 87 UNIT/L (ref 55–135)
ALT SERPL-CCNC: 14 UNIT/L (ref 10–44)
ALT SERPL-CCNC: 15 UNIT/L (ref 10–44)
ANION GAP (SMH): 6 MMOL/L (ref 8–16)
ANION GAP (SMH): 7 MMOL/L (ref 8–16)
AST SERPL-CCNC: 13 UNIT/L (ref 10–40)
AST SERPL-CCNC: 15 UNIT/L (ref 10–40)
BASOPHILS # BLD AUTO: 0.02 K/UL
BASOPHILS # BLD AUTO: 0.03 K/UL
BASOPHILS NFR BLD AUTO: 0.3 %
BASOPHILS NFR BLD AUTO: 0.5 %
BILIRUB SERPL-MCNC: 0.2 MG/DL (ref 0.1–1)
BILIRUB SERPL-MCNC: 0.3 MG/DL (ref 0.1–1)
BILIRUBIN, UA POC OHS: NEGATIVE
BLOOD, UA POC OHS: ABNORMAL
BUN SERPL-MCNC: 19 MG/DL (ref 8–23)
BUN SERPL-MCNC: 23 MG/DL (ref 8–23)
CALCIUM SERPL-MCNC: 9 MG/DL (ref 8.7–10.5)
CALCIUM SERPL-MCNC: 9.2 MG/DL (ref 8.7–10.5)
CHLORIDE SERPL-SCNC: 109 MMOL/L (ref 95–110)
CHLORIDE SERPL-SCNC: 109 MMOL/L (ref 95–110)
CLARITY, UA POC OHS: CLEAR
CO2 SERPL-SCNC: 23 MMOL/L (ref 23–29)
CO2 SERPL-SCNC: 23 MMOL/L (ref 23–29)
COLOR, UA POC OHS: YELLOW
CREAT SERPL-MCNC: 0.8 MG/DL (ref 0.5–1.4)
CREAT SERPL-MCNC: 0.9 MG/DL (ref 0.5–1.4)
ERYTHROCYTE [DISTWIDTH] IN BLOOD BY AUTOMATED COUNT: 18.4 % (ref 11.5–14.5)
ERYTHROCYTE [DISTWIDTH] IN BLOOD BY AUTOMATED COUNT: 18.5 % (ref 11.5–14.5)
GFR SERPLBLD CREATININE-BSD FMLA CKD-EPI: >60 ML/MIN/1.73/M2
GFR SERPLBLD CREATININE-BSD FMLA CKD-EPI: >60 ML/MIN/1.73/M2
GLUCOSE SERPL-MCNC: 103 MG/DL (ref 70–110)
GLUCOSE SERPL-MCNC: 110 MG/DL (ref 70–110)
GLUCOSE, UA POC OHS: NEGATIVE
HCT VFR BLD AUTO: 25.4 % (ref 37–48.5)
HCT VFR BLD AUTO: 26.4 % (ref 37–48.5)
HGB BLD-MCNC: 7.4 GM/DL (ref 12–16)
HGB BLD-MCNC: 7.8 GM/DL (ref 12–16)
IMM GRANULOCYTES # BLD AUTO: 0.02 K/UL (ref 0–0.04)
IMM GRANULOCYTES # BLD AUTO: 0.04 K/UL (ref 0–0.04)
IMM GRANULOCYTES NFR BLD AUTO: 0.3 % (ref 0–0.5)
IMM GRANULOCYTES NFR BLD AUTO: 0.7 % (ref 0–0.5)
INDIRECT COOMBS: NORMAL
KETONES, UA POC OHS: NEGATIVE
LEUKOCYTES, UA POC OHS: NEGATIVE
LYMPHOCYTES # BLD AUTO: 1.12 K/UL (ref 1–4.8)
LYMPHOCYTES # BLD AUTO: 1.63 K/UL (ref 1–4.8)
MCH RBC QN AUTO: 24.8 PG (ref 27–31)
MCH RBC QN AUTO: 25.1 PG (ref 27–31)
MCHC RBC AUTO-ENTMCNC: 29.1 G/DL (ref 32–36)
MCHC RBC AUTO-ENTMCNC: 29.5 G/DL (ref 32–36)
MCV RBC AUTO: 85 FL (ref 82–98)
MCV RBC AUTO: 85 FL (ref 82–98)
NITRITE, UA POC OHS: NEGATIVE
NUCLEATED RBC (/100WBC) (SMH): 0 /100 WBC
NUCLEATED RBC (/100WBC) (SMH): 0 /100 WBC
OB PNL STL: NEGATIVE
PH, UA POC OHS: 7
PLATELET # BLD AUTO: 470 K/UL (ref 150–450)
PLATELET # BLD AUTO: 542 K/UL (ref 150–450)
PMV BLD AUTO: 9.2 FL (ref 9.2–12.9)
PMV BLD AUTO: 9.6 FL (ref 9.2–12.9)
POTASSIUM SERPL-SCNC: 4.1 MMOL/L (ref 3.5–5.1)
POTASSIUM SERPL-SCNC: 4.4 MMOL/L (ref 3.5–5.1)
PROT SERPL-MCNC: 6.6 GM/DL (ref 6–8.4)
PROT SERPL-MCNC: 6.8 GM/DL (ref 6–8.4)
PROTEIN, UA POC OHS: 30
RBC # BLD AUTO: 2.98 M/UL (ref 4–5.4)
RBC # BLD AUTO: 3.11 M/UL (ref 4–5.4)
RELATIVE EOSINOPHIL (SMH): 1.9 % (ref 0–8)
RELATIVE EOSINOPHIL (SMH): 2.5 % (ref 0–8)
RELATIVE LYMPHOCYTE (SMH): 19.1 % (ref 18–48)
RELATIVE LYMPHOCYTE (SMH): 25.8 % (ref 18–48)
RELATIVE MONOCYTE (SMH): 11.4 % (ref 4–15)
RELATIVE MONOCYTE (SMH): 7.2 % (ref 4–15)
RELATIVE NEUTROPHIL (SMH): 59.7 % (ref 38–73)
RELATIVE NEUTROPHIL (SMH): 70.6 % (ref 38–73)
RH BLD: NORMAL
SODIUM SERPL-SCNC: 138 MMOL/L (ref 136–145)
SODIUM SERPL-SCNC: 139 MMOL/L (ref 136–145)
SPECIFIC GRAVITY, UA POC OHS: 1.01
SPECIMEN OUTDATE: NORMAL
UROBILINOGEN, UA POC OHS: 0.2
WBC # BLD AUTO: 5.85 K/UL (ref 3.9–12.7)
WBC # BLD AUTO: 6.32 K/UL (ref 3.9–12.7)

## 2025-04-22 PROCEDURE — 83690 ASSAY OF LIPASE: CPT

## 2025-04-22 PROCEDURE — 99495 TRANSJ CARE MGMT MOD F2F 14D: CPT | Mod: S$PBB,,,

## 2025-04-22 PROCEDURE — 93010 ELECTROCARDIOGRAM REPORT: CPT | Mod: ,,, | Performed by: INTERNAL MEDICINE

## 2025-04-22 PROCEDURE — 82272 OCCULT BLD FECES 1-3 TESTS: CPT

## 2025-04-22 PROCEDURE — 82040 ASSAY OF SERUM ALBUMIN: CPT

## 2025-04-22 PROCEDURE — 36430 TRANSFUSION BLD/BLD COMPNT: CPT

## 2025-04-22 PROCEDURE — 85025 COMPLETE CBC W/AUTO DIFF WBC: CPT | Performed by: NURSE PRACTITIONER

## 2025-04-22 PROCEDURE — 99285 EMERGENCY DEPT VISIT HI MDM: CPT | Mod: 25

## 2025-04-22 PROCEDURE — 93005 ELECTROCARDIOGRAM TRACING: CPT | Performed by: INTERNAL MEDICINE

## 2025-04-22 PROCEDURE — 87088 URINE BACTERIA CULTURE: CPT

## 2025-04-22 PROCEDURE — 30233N1 TRANSFUSION OF NONAUTOLOGOUS RED BLOOD CELLS INTO PERIPHERAL VEIN, PERCUTANEOUS APPROACH: ICD-10-PCS | Performed by: HOSPITALIST

## 2025-04-22 PROCEDURE — 85025 COMPLETE CBC W/AUTO DIFF WBC: CPT

## 2025-04-22 PROCEDURE — 36415 COLL VENOUS BLD VENIPUNCTURE: CPT

## 2025-04-22 PROCEDURE — 81003 URINALYSIS AUTO W/O SCOPE: CPT | Mod: PBBFAC,PN

## 2025-04-22 PROCEDURE — 80053 COMPREHEN METABOLIC PANEL: CPT | Performed by: NURSE PRACTITIONER

## 2025-04-22 PROCEDURE — 12000002 HC ACUTE/MED SURGE SEMI-PRIVATE ROOM

## 2025-04-22 PROCEDURE — 99999 PR PBB SHADOW E&M-EST. PATIENT-LVL V: CPT | Mod: PBBFAC,,,

## 2025-04-22 PROCEDURE — 99215 OFFICE O/P EST HI 40 MIN: CPT | Mod: PBBFAC,PN

## 2025-04-22 PROCEDURE — 86920 COMPATIBILITY TEST SPIN: CPT

## 2025-04-22 PROCEDURE — 86850 RBC ANTIBODY SCREEN: CPT | Performed by: NURSE PRACTITIONER

## 2025-04-22 PROCEDURE — 99999PBSHW POCT URINALYSIS(INSTRUMENT): Mod: PBBFAC,,,

## 2025-04-22 RX ORDER — HYDROCODONE BITARTRATE AND ACETAMINOPHEN 500; 5 MG/1; MG/1
TABLET ORAL
Status: DISCONTINUED | OUTPATIENT
Start: 2025-04-23 | End: 2025-04-24 | Stop reason: HOSPADM

## 2025-04-22 RX ORDER — HYDROMORPHONE HYDROCHLORIDE 1 MG/ML
0.2 INJECTION, SOLUTION INTRAMUSCULAR; INTRAVENOUS; SUBCUTANEOUS
Status: COMPLETED | OUTPATIENT
Start: 2025-04-22 | End: 2025-04-23

## 2025-04-22 RX ORDER — SODIUM CHLORIDE 9 MG/ML
INJECTION, SOLUTION INTRAVENOUS CONTINUOUS
Status: DISCONTINUED | OUTPATIENT
Start: 2025-04-23 | End: 2025-04-24

## 2025-04-22 RX ORDER — PANTOPRAZOLE SODIUM 40 MG/10ML
80 INJECTION, POWDER, LYOPHILIZED, FOR SOLUTION INTRAVENOUS
Status: COMPLETED | OUTPATIENT
Start: 2025-04-22 | End: 2025-04-23

## 2025-04-22 RX ORDER — NALOXONE HCL 0.4 MG/ML
0.02 VIAL (ML) INJECTION
Status: DISCONTINUED | OUTPATIENT
Start: 2025-04-23 | End: 2025-04-24 | Stop reason: HOSPADM

## 2025-04-22 RX ORDER — IBUPROFEN 200 MG
24 TABLET ORAL
Status: DISCONTINUED | OUTPATIENT
Start: 2025-04-23 | End: 2025-04-24 | Stop reason: HOSPADM

## 2025-04-22 RX ORDER — PROCHLORPERAZINE EDISYLATE 5 MG/ML
5 INJECTION INTRAMUSCULAR; INTRAVENOUS EVERY 6 HOURS PRN
Status: DISCONTINUED | OUTPATIENT
Start: 2025-04-23 | End: 2025-04-24 | Stop reason: HOSPADM

## 2025-04-22 RX ORDER — LANOLIN ALCOHOL/MO/W.PET/CERES
800 CREAM (GRAM) TOPICAL
Status: DISCONTINUED | OUTPATIENT
Start: 2025-04-23 | End: 2025-04-24 | Stop reason: HOSPADM

## 2025-04-22 RX ORDER — AMLODIPINE BESYLATE 5 MG/1
5 TABLET ORAL DAILY
Status: DISCONTINUED | OUTPATIENT
Start: 2025-04-23 | End: 2025-04-24 | Stop reason: HOSPADM

## 2025-04-22 RX ORDER — TALC
9 POWDER (GRAM) TOPICAL NIGHTLY PRN
Status: DISCONTINUED | OUTPATIENT
Start: 2025-04-23 | End: 2025-04-24 | Stop reason: HOSPADM

## 2025-04-22 RX ORDER — LEVETIRACETAM 500 MG/1
500 TABLET ORAL 2 TIMES DAILY
Status: DISCONTINUED | OUTPATIENT
Start: 2025-04-23 | End: 2025-04-24 | Stop reason: HOSPADM

## 2025-04-22 RX ORDER — PANTOPRAZOLE SODIUM 40 MG/10ML
40 INJECTION, POWDER, LYOPHILIZED, FOR SOLUTION INTRAVENOUS DAILY
Status: DISCONTINUED | OUTPATIENT
Start: 2025-04-23 | End: 2025-04-24 | Stop reason: HOSPADM

## 2025-04-22 RX ORDER — GLUCAGON 1 MG
1 KIT INJECTION
Status: DISCONTINUED | OUTPATIENT
Start: 2025-04-23 | End: 2025-04-24 | Stop reason: HOSPADM

## 2025-04-22 RX ORDER — ONDANSETRON HYDROCHLORIDE 2 MG/ML
4 INJECTION, SOLUTION INTRAVENOUS EVERY 6 HOURS PRN
Status: DISCONTINUED | OUTPATIENT
Start: 2025-04-23 | End: 2025-04-24 | Stop reason: HOSPADM

## 2025-04-22 RX ORDER — MORPHINE SULFATE 2 MG/ML
2 INJECTION, SOLUTION INTRAMUSCULAR; INTRAVENOUS EVERY 4 HOURS PRN
Status: DISCONTINUED | OUTPATIENT
Start: 2025-04-23 | End: 2025-04-23

## 2025-04-22 RX ORDER — ACETAMINOPHEN 325 MG/1
650 TABLET ORAL EVERY 4 HOURS PRN
Status: DISCONTINUED | OUTPATIENT
Start: 2025-04-23 | End: 2025-04-24 | Stop reason: HOSPADM

## 2025-04-22 RX ORDER — IBUPROFEN 200 MG
16 TABLET ORAL
Status: DISCONTINUED | OUTPATIENT
Start: 2025-04-23 | End: 2025-04-24 | Stop reason: HOSPADM

## 2025-04-22 NOTE — TELEPHONE ENCOUNTER
Called 5x all the numbers. Hers dont work. The  and son's go straight to  every time. Left  on son and  phone     To ER due to low blood

## 2025-04-22 NOTE — PROGRESS NOTES
Subjective:       Patient ID: Froilan Ray is a 79 y.o. female.    Admit Date: 04/12/2025  Discharge Date: 04/13/2025  Discharge Facility: Hospital    Family and/or Caretaker present at visit?  Yes.  Diagnostic tests reviewed/disposition: I have reviewed all completed as well as pending diagnostic tests at the time of discharge.  Disease/illness education: anemia symptoms.  Return precautions.   Home health/community services discussion/referrals: Patient does not have home health established from hospital visit.  They do not need home health.  If needed, we will set up home health for the patient.   Establishment or re-establishment of referral orders for community resources: No other necessary community resources.   Discussion with other health care providers: No discussion with other health care providers necessary.     Chief Complaint: Hospital Follow Up    Froilan Ray is a 79 y.o. female patient that presents to clinic accompanied by her  for hospital follow up.   She was admitted for symptomatic anemia. Her H&H was 6.6 and 23.  She was transfused with 2 units PRBCs.  Her H&H improved to 9.0 and 29.  She states she is still feeling very weak.  Usually when she has blood transfusions she will feel better but this time she did not.  She denies having bloody stools.  Denies nausea or vomiting.  No stomach pain.  She is eating ok.  Her blood pressure is high today.  She states that she did not take her medications this morning.  She has no chest pain or palpitations.    She is having pain in her left hip which is chronic.  Feels like her left hip is jumping our of socket.  She was recently seen by Dr Boothe and xray was done showing 1. Left hip arthroplasty. 2. Left acetabular protrusio, unchanged since 11/27/2024 CT, but new since 03/20/2024 radiographs.  Dr Boothe recommended referral to Orthopedics for further evaluation.  She was also referred to Dr Snider with pain management.  Patient states  she was not contacted to schedule appointment.         Hospital notes:  79-year-old female presented emergency department sent by primary care provider as she had labs recently and hemoglobin slow.  Patient is feeling weak and tired and that is the reason she had labs.  Patient has history of anemia in the past and needed blood transfusion.  Patient also has some shortness of breath with exertion along with feeling tired and weak.  Denies any dysuria or hematuria.  Denies any chest pain or abdominal pain.  Denies any focal weakness.  Patient states stool is normal colored and denies any GI bleeding.     Medical Decision Making  79-year-old female with symptomatic anemia sent for blood transfusion.  Patient otherwise hemodynamically stable and agreed to receive blood transfusion.  Patient denied any GI bleeding and will evaluate for GI bleeding with the next stool sample she can give us.  Patient otherwise nontoxic and agreed to blood transfusion so blood transfusion ordered and Hospital Medicine consulted for evaluation for further management and treatment.      Narrative & Impression  EXAMINATION:  XR HIP WITH PELVIS WHEN PERFORMED 2 OR 3 VIEWS LEFT     CLINICAL HISTORY:  Pain in left hip     FINDINGS:  AP pelvis and two views of left hip compared with 11/27/2024 CT and 03/20/2024 radiographs show unchanged left hip arthroplasty.  Left acetabular protrusio has not significantly changed since 11/27/2024 CT, although is new since 03/20/2024 radiographs.  Chronic deformity of left inferior pubic ramus and right pubic body indicate old healed fractures.  No acute fracture or dislocation.  Right hip joint space narrowing is mild.  Mild to moderate amount of stool noted throughout the colon.  Convex left lumbar spine curvature and multilevel degenerative changes partially visualized.     Impression:     1. Left hip arthroplasty.  2. Left acetabular protrusio, unchanged since 11/27/2024 CT, but new since 03/20/2024  radiographs.        Electronically signed by:Bryan Stevens  Date:                                            04/12/2025  Time:                                           11:57        Plan  - Monitor serial CBC: Daily  - Transfuse PRBC if patient becomes hemodynamically unstable, symptomatic or H/H drops below 7/21.  - Patient has received 2 units of PRBCs on 11/2024  - Patient's anemia is currently worsening. Will continue current treatment  -Vitamin B12 1000 mcg subcutaneous x1  Seizure disorder  Chronic  Patient stopped taking Keppra  Seizure precautions     Hypertension  Patient's blood pressure range in the last 24 hours was: BP  Min: 143/73  Max: 162/74.The patient's inpatient anti-hypertensive regimen is listed below:  Current Antihypertensives  amLODIPine tablet 5 mg, Daily, Oral  lisinopriL tablet 10 mg, Daily, Oral     Plan  - BP is controlled, no changes needed to their regimen                      Review of patient's allergies indicates:  No Known Allergies  Social Drivers of Health     Tobacco Use: Low Risk  (4/22/2025)    Patient History     Smoking Tobacco Use: Never     Smokeless Tobacco Use: Never     Passive Exposure: Not on file   Alcohol Use: Not At Risk (9/25/2024)    AUDIT-C     Frequency of Alcohol Consumption: Never     Average Number of Drinks: Patient does not drink     Frequency of Binge Drinking: Never   Financial Resource Strain: Patient Unable To Answer (11/28/2024)    Overall Financial Resource Strain (CARDIA)     Difficulty of Paying Living Expenses: Patient unable to answer   Food Insecurity: Patient Unable To Answer (11/28/2024)    Hunger Vital Sign     Worried About Running Out of Food in the Last Year: Patient unable to answer     Ran Out of Food in the Last Year: Patient unable to answer   Transportation Needs: Patient Unable To Answer (11/28/2024)    TRANSPORTATION NEEDS     Transportation : Patient unable to answer   Physical Activity: Inactive (9/25/2024)    Exercise Vital Sign      Days of Exercise per Week: 0 days     Minutes of Exercise per Session: 0 min   Stress: Patient Unable To Answer (11/28/2024)    [employer] - Occupational Stress Questionnaire     Feeling of Stress : Patient unable to answer   Housing Stability: Patient Unable To Answer (11/28/2024)    Housing Stability Vital Sign     Unable to Pay for Housing in the Last Year: Patient unable to answer     Number of Times Moved in the Last Year: Not on file     Homeless in the Last Year: Patient unable to answer   Depression: Medium Risk (4/11/2025)    Depression     Last PHQ-4: Flowsheet Data: 3   Utilities: Patient Unable To Answer (11/28/2024)    [facility] Utilities     Threatened with loss of utilities: Patient unable to answer   Health Literacy: Patient Unable To Answer (11/28/2024)     Health Literacy     Frequency of need for help with medical instructions: Patient unable to respond   Social Isolation: Socially Integrated (9/25/2024)    Social Isolation     Social Isolation: 1      Past Medical History:   Diagnosis Date    Anemia     Arthritis     Bleeding ulcer 07/2016    Chronic pain     DDD (degenerative disc disease), cervical     DDD (degenerative disc disease), lumbar     Depression     Disc degeneration, lumbosacral     Diverticulitis     Encounter for blood transfusion     Hypertension     IBS (irritable bowel syndrome)     Neuropathy of both feet     Seizures     none  since 2017    Umbilical hernia 2020      Past Surgical History:   Procedure Laterality Date    ARTHROPLASTY, HIP, GIRDLESTONE, POSTERIOR APPROACH Left 1/19/2024    Procedure: ARTHROPLASTY, HIP, GIRDLESTONE, POSTERIOR APPROACH;  Surgeon: Bulmaro Tellez MD;  Location: [address];  Service: Orthopedics;  Laterality: Left;    ARTHROPLASTY, HIP, GIRDLESTONE, POSTERIOR APPROACH Left 1/25/2024    Procedure: Irrigation and debridement left hip,;  Surgeon: Juanito Painting MD;  Location: [facility] OR [address] [place] [address];   Service: Orthopedics;  Laterality: Left;    ARTHROPLASTY, HIP, GIRDLESTONE, POSTERIOR APPROACH Left 2/15/2024    Procedure: Revision hip antibiotic spacer head exchange, left, lateral, peg board, depuy osteomed in room, vanc, ancef, txa,;  Surgeon: Bulmaro Tellez MD;  Location: [facility] OR 2ND FLR;  Service: Orthopedics;  Laterality: Left;    BACK SURGERY      BREAST BIOPSY      BREAST SURGERY Right     lumpectomy    CAUDAL EPIDURAL STEROID INJECTION N/A 01/28/2022    Procedure: Injection-steroid-epidural-caudal;  Surgeon: Luzmaria Marcelino MD;  Location: [facility] OR;  Service: Pain Management;  Laterality: N/A;    COLONOSCOPY N/A 01/14/2022    Procedure: COLONOSCOPY;  Surgeon: Abby Landers MD;  Location: [facility];  Service: Endoscopy;  Laterality: N/A;    COLONOSCOPY N/A 11/30/2024    Procedure: COLONOSCOPY;  Surgeon: Marcio Nguyễn III, MD;  Location: [facility];  Service: Endoscopy;  Laterality: N/A;    ENDOSCOPIC ULTRASOUND OF UPPER GASTROINTESTINAL TRACT N/A 07/21/2020    Procedure: ULTRASOUND, UPPER GI TRACT, ENDOSCOPIC;  Surgeon: Marcio Nguyễn III, MD;  Location: [facility] ENDO;  Service: Endoscopy;  Laterality: N/A;    ENDOSCOPIC ULTRASOUND OF UPPER GASTROINTESTINAL TRACT N/A 9/27/2024    Procedure: ULTRASOUND, UPPER GI TRACT, ENDOSCOPIC;  Surgeon: Marcio Nguyễn III, MD;  Location: [facility] ENDO;  Service: Endoscopy;  Laterality: N/A;    ESOPHAGOGASTRODUODENOSCOPY N/A 01/14/2022    Procedure: EGD (ESOPHAGOGASTRODUODENOSCOPY);  Surgeon: Abby Landers MD;  Location: [facility] ENDO;  Service: Endoscopy;  Laterality: N/A;    ESOPHAGOGASTRODUODENOSCOPY N/A 06/10/2022    Procedure: EGD (ESOPHAGOGASTRODUODENOSCOPY);  Surgeon: Abby Landers MD;  Location: [facility] ENDO;  Service: Endoscopy;  Laterality: N/A;    EYE SURGERY      cataract    FLAP GRAFT, LOCAL Left 2/15/2024    Procedure: FLAP GRAFT, LOCAL;  Surgeon: Bulmaro Tellez MD;  Location: [facility] OR 2ND FLR;  Service: Orthopedics;  Laterality:  Left;    HYSTERECTOMY      INTRAMEDULLARY RODDING OF TROCHANTER OF FEMUR Left 12/11/2018    Procedure: INSERTION, INTRAMEDULLARY SANTOS, FEMUR, TROCHANTER;  Surgeon: Eulalio De La Cruz MD;  Location: [facility] OR;  Service: Orthopedics;  Laterality: Left;    IRRIGATION AND DEBRIDEMENT OF LOWER EXTREMITY Left 1/19/2024    Procedure: IRRIGATION AND DEBRIDEMENT, LOWER EXTREMITY;  Surgeon: Bulmaro Tellez MD;  Location: [facility] OR 2ND FLR;  Service: Orthopedics;  Laterality: Left;    IRRIGATION AND DEBRIDEMENT OF LOWER EXTREMITY Left 2/15/2024    Procedure: IRRIGATION AND DEBRIDEMENT, LOWER EXTREMITY;  Surgeon: Bulmaro Tellez MD;  Location: [facility] OR 2ND FLR;  Service: Orthopedics;  Laterality: Left;    LAPAROSCOPIC CHOLECYSTECTOMY N/A 9/8/2024    Procedure: CHOLECYSTECTOMY, LAPAROSCOPIC;  Surgeon: Cipriano Ambrosio MD;  Location: [facility];  Service: General;  Laterality: N/A;    REPAIR OF EPIGASTRIC HERNIA N/A 12/7/2023    Procedure: REPAIR, HERNIA, EPIGASTRIC;  Surgeon: Vipul Escobar MD;  Location: [facility];  Service: General;  Laterality: N/A;    SPINAL CORD STIMULATOR IMPLANT  09/18/2013    and removal    SPINE SURGERY  2006    lumbar L2-S1 decompression.    SPINE SURGERY      cervical decompression    TONSILLECTOMY        Social History[1]    Current Medications[2]    Lab Results   Component Value Date    WBC 11.16 04/13/2025    HGB 9.0 (L) 04/13/2025    HCT 29.2 (L) 04/13/2025     04/13/2025    CHOL 132 04/12/2025    TRIG 139 04/12/2025    HDL 57 04/12/2025    ALT 8 (L) 04/13/2025    AST 9 (L) 04/13/2025     04/13/2025    K 4.0 04/13/2025     11/30/2024    CREATININE 0.7 04/13/2025    BUN 24 (H) 04/13/2025    CO2 21 (L) 04/13/2025    TSH 0.127 (L) 04/12/2025    INR 1.0 09/27/2024    HGBA1C 5.2 04/12/2025       Review of Systems   Constitutional:  Positive for fatigue. Negative for chills and fever.        Feels very weak   HENT: Negative.     Respiratory:  Negative for chest tightness and shortness  of breath.    Cardiovascular:  Negative for chest pain, palpitations and leg swelling.   Gastrointestinal:  Negative for abdominal pain, blood in stool, constipation, nausea and vomiting.   Genitourinary:  Positive for bladder incontinence and urgency. Negative for dysuria, flank pain and hematuria.   Musculoskeletal:  Positive for arthralgias, back pain and gait problem.       Objective:      Physical Exam  Vitals reviewed.   Constitutional:       Appearance: Normal appearance.   Cardiovascular:      Rate and Rhythm: Normal rate and regular rhythm.      Pulses: Normal pulses.      Heart sounds: Normal heart sounds.   Pulmonary:      Effort: Pulmonary effort is normal.      Breath sounds: Normal breath sounds.   Musculoskeletal:      Thoracic back: Decreased range of motion.      Lumbar back: Decreased range of motion.      Right hip: Decreased range of motion.      Left hip: Decreased range of motion.      Comments: Has antalgic gait.  Uses walker to ambulate   Neurological:      Mental Status: She is alert.   Psychiatric:         Mood and Affect: Mood normal. Affect is flat.         Speech: Speech normal.         Behavior: Behavior normal. Behavior is cooperative.         Thought Content: Thought content normal.         Assessment:       1. Hospital discharge follow-up    2. Symptomatic anemia    3. Weakness    4. Chronic gastric ulcer with bleeding    5. Gastrointestinal hemorrhage, unspecified gastrointestinal hemorrhage type    6. Left hip pain    7. Urgency of urination    8. Uses walker    9. Chronic pain with uncomplicated opioid dependence    10. Hematuria, unspecified type        Plan:       Froilan was seen today for hospital follow up.    Diagnoses and all orders for this visit:    Hospital discharge follow-up    Symptomatic anemia  -     CBC Auto Differential; Future  -     Comprehensive Metabolic Panel; Future    Weakness  -     CBC Auto Differential; Future  -     Comprehensive Metabolic Panel;  Future  -     POCT Urinalysis(Instrument)    Chronic gastric ulcer with bleeding    Gastrointestinal hemorrhage, unspecified gastrointestinal hemorrhage type  -     CBC Auto Differential; Future  -     Ambulatory referral/consult to Gastroenterology; Future    Left hip pain  -     Ambulatory referral/consult to Orthopedics; Future    Urgency of urination    Uses walker    Chronic pain with uncomplicated opioid dependence    Hematuria, unspecified type  -     Urine Culture High Risk    Repeat labs today to monitor anemia.  Referral placed to GI for evaluation for GI bleed.     Urinalysis today negative for infection but did have trace hematuria so sent for culture  Referral placed to Orthopedics for her left hip pain as recommended by Dr Boothe  She was referred to Dr Snider with pain management but never scheduled.  She was provided with his contact number to schedule.    Follow up in clinic in 3 months or sooner if needed.              [1]   Social History  Socioeconomic History    Marital status:    [2]   Current Outpatient Medications:     acetaminophen (TYLENOL) 325 MG tablet, Take 2 tablets (650 mg total) by mouth every 8 (eight) hours as needed for Pain (pain scale 1-4)., Disp: , Rfl: 0    amlodipine-benazepril 5-20 mg (LOTREL) 5-20 mg per capsule, Take 1 capsule by mouth once daily., Disp: 90 capsule, Rfl: 3    cyanocobalamin 1,000 mcg/mL injection, Inject 1 mL (1,000 mcg total) into the skin once daily., Disp: 30 mL, Rfl: 0    cyclobenzaprine (FLEXERIL) 10 MG tablet, Take 1 tablet (10 mg total) by mouth 3 (three) times daily as needed for Muscle spasms., Disp: , Rfl:     docusate sodium (COLACE) 100 MG capsule, Take 1 capsule (100 mg total) by mouth 2 (two) times daily., Disp: 60 capsule, Rfl: 1    famotidine (PEPCID) 20 MG tablet, Take 20 mg by mouth daily as needed for Heartburn., Disp: , Rfl:     levETIRAcetam (KEPPRA) 500 MG Tab, Take 1 tablet (500 mg total) by mouth 2 (two) times daily., Disp: 180  tablet, Rfl: 1    oxyCODONE-acetaminophen (PERCOCET)  mg per tablet, Take 1 tablet by mouth every 4 (four) hours as needed for Pain., Disp: 12 tablet, Rfl: 0    pregabalin (LYRICA) 200 MG Cap, Take 1 capsule (200 mg total) by mouth 3 (three) times daily., Disp: 90 capsule, Rfl: 0    cyanocobalamin (VITAMIN B-12) 1000 MCG tablet, Take 0.5 tablets (500 mcg total) by mouth once daily., Disp: 15 tablet, Rfl: 0

## 2025-04-22 NOTE — PROGRESS NOTES
Blood count is to low.  Patient needs to go to ER for evaluation.  Attempted several times to reach patient and her  but have been unsuccessful.  A message was left on husbands cell phone advising to go to ER.

## 2025-04-22 NOTE — PATIENT INSTRUCTIONS
Jerry Snider MD  Pain management  [address] [facility] [address]  Phone: [phone]  Fax: [phone]    Dr Monteiro Plano  Orthopedics  [address], [place] [address]  Phone: [phone]    Dr Chance  Gastroenterologist  [address], [place] [address]  Phone: [phone]

## 2025-04-23 PROBLEM — K57.92 DIVERTICULITIS: Chronic | Status: ACTIVE | Noted: 2025-04-23

## 2025-04-23 LAB
ABO + RH BLD: NORMAL
ABSOLUTE EOSINOPHIL (SMH): 0.18 K/UL
ABSOLUTE MONOCYTE (SMH): 0.58 K/UL (ref 0.3–1)
ABSOLUTE NEUTROPHIL COUNT (SMH): 2.4 K/UL (ref 1.8–7.7)
ALBUMIN SERPL-MCNC: 3.4 G/DL (ref 3.5–5.2)
ALP SERPL-CCNC: 69 UNIT/L (ref 55–135)
ALT SERPL-CCNC: 13 UNIT/L (ref 10–44)
ANION GAP (SMH): 8 MMOL/L (ref 8–16)
AST SERPL-CCNC: 20 UNIT/L (ref 10–40)
BASOPHILS # BLD AUTO: 0.04 K/UL
BASOPHILS NFR BLD AUTO: 0.8 %
BILIRUB SERPL-MCNC: 0.4 MG/DL (ref 0.1–1)
BILIRUB UR QL STRIP.AUTO: NEGATIVE
BLD PROD TYP BPU: NORMAL
BLOOD UNIT EXPIRATION DATE: NORMAL
BLOOD UNIT TYPE CODE: 7300
BUN SERPL-MCNC: 19 MG/DL (ref 8–23)
CALCIUM SERPL-MCNC: 8.6 MG/DL (ref 8.7–10.5)
CHLORIDE SERPL-SCNC: 110 MMOL/L (ref 95–110)
CLARITY UR: CLEAR
CO2 SERPL-SCNC: 20 MMOL/L (ref 23–29)
COLOR UR AUTO: YELLOW
CREAT SERPL-MCNC: 0.7 MG/DL (ref 0.5–1.4)
CROSSMATCH INTERPRETATION: NORMAL
DISPENSE STATUS: NORMAL
ERYTHROCYTE [DISTWIDTH] IN BLOOD BY AUTOMATED COUNT: 17.8 % (ref 11.5–14.5)
GFR SERPLBLD CREATININE-BSD FMLA CKD-EPI: >60 ML/MIN/1.73/M2
GLUCOSE SERPL-MCNC: 102 MG/DL (ref 70–110)
GLUCOSE UR QL STRIP: NEGATIVE
HCT VFR BLD AUTO: 27.1 % (ref 37–48.5)
HGB BLD-MCNC: 8.1 GM/DL (ref 12–16)
HGB UR QL STRIP: NEGATIVE
IMM GRANULOCYTES # BLD AUTO: 0.02 K/UL (ref 0–0.04)
IMM GRANULOCYTES NFR BLD AUTO: 0.4 % (ref 0–0.5)
KETONES UR QL STRIP: NEGATIVE
LEUKOCYTE ESTERASE UR QL STRIP: NEGATIVE
LIPASE SERPL-CCNC: 40 U/L (ref 4–60)
LYMPHOCYTES # BLD AUTO: 1.84 K/UL (ref 1–4.8)
MCH RBC QN AUTO: 26.1 PG (ref 27–31)
MCHC RBC AUTO-ENTMCNC: 29.9 G/DL (ref 32–36)
MCV RBC AUTO: 87 FL (ref 82–98)
NITRITE UR QL STRIP: NEGATIVE
NUCLEATED RBC (/100WBC) (SMH): 0 /100 WBC
PH UR STRIP: 6 [PH]
PLATELET # BLD AUTO: 391 K/UL (ref 150–450)
PMV BLD AUTO: 9.8 FL (ref 9.2–12.9)
POTASSIUM SERPL-SCNC: 4.7 MMOL/L (ref 3.5–5.1)
PROT SERPL-MCNC: 5.9 GM/DL (ref 6–8.4)
PROT UR QL STRIP: NEGATIVE
RBC # BLD AUTO: 3.1 M/UL (ref 4–5.4)
RELATIVE EOSINOPHIL (SMH): 3.6 % (ref 0–8)
RELATIVE LYMPHOCYTE (SMH): 36.5 % (ref 18–48)
RELATIVE MONOCYTE (SMH): 11.5 % (ref 4–15)
RELATIVE NEUTROPHIL (SMH): 47.2 % (ref 38–73)
SODIUM SERPL-SCNC: 138 MMOL/L (ref 136–145)
SP GR UR STRIP: 1.01
UNIT NUMBER: NORMAL
UROBILINOGEN UR STRIP-ACNC: NEGATIVE EU/DL
WBC # BLD AUTO: 5.04 K/UL (ref 3.9–12.7)

## 2025-04-23 PROCEDURE — 25000003 PHARM REV CODE 250

## 2025-04-23 PROCEDURE — 63600175 PHARM REV CODE 636 W HCPCS: Mod: JZ | Performed by: INTERNAL MEDICINE

## 2025-04-23 PROCEDURE — 97162 PT EVAL MOD COMPLEX 30 MIN: CPT

## 2025-04-23 PROCEDURE — 85025 COMPLETE CBC W/AUTO DIFF WBC: CPT

## 2025-04-23 PROCEDURE — 81003 URINALYSIS AUTO W/O SCOPE: CPT

## 2025-04-23 PROCEDURE — 97166 OT EVAL MOD COMPLEX 45 MIN: CPT

## 2025-04-23 PROCEDURE — 63600175 PHARM REV CODE 636 W HCPCS: Mod: JZ

## 2025-04-23 PROCEDURE — 80053 COMPREHEN METABOLIC PANEL: CPT

## 2025-04-23 PROCEDURE — 97530 THERAPEUTIC ACTIVITIES: CPT

## 2025-04-23 PROCEDURE — P9016 RBC LEUKOCYTES REDUCED: HCPCS

## 2025-04-23 PROCEDURE — 36430 TRANSFUSION BLD/BLD COMPNT: CPT

## 2025-04-23 PROCEDURE — 25500020 PHARM REV CODE 255: Performed by: INTERNAL MEDICINE

## 2025-04-23 PROCEDURE — 12000002 HC ACUTE/MED SURGE SEMI-PRIVATE ROOM

## 2025-04-23 PROCEDURE — 25000003 PHARM REV CODE 250: Performed by: NURSE PRACTITIONER

## 2025-04-23 RX ORDER — HYDROMORPHONE HYDROCHLORIDE 1 MG/ML
0.25 INJECTION, SOLUTION INTRAMUSCULAR; INTRAVENOUS; SUBCUTANEOUS
Status: DISCONTINUED | OUTPATIENT
Start: 2025-04-23 | End: 2025-04-24 | Stop reason: HOSPADM

## 2025-04-23 RX ORDER — HYDROCODONE BITARTRATE AND ACETAMINOPHEN 5; 325 MG/1; MG/1
1 TABLET ORAL EVERY 6 HOURS PRN
Refills: 0 | Status: DISCONTINUED | OUTPATIENT
Start: 2025-04-23 | End: 2025-04-24 | Stop reason: HOSPADM

## 2025-04-23 RX ADMIN — AMLODIPINE BESYLATE 5 MG: 5 TABLET ORAL at 09:04

## 2025-04-23 RX ADMIN — PANTOPRAZOLE SODIUM 80 MG: 40 INJECTION, POWDER, FOR SOLUTION INTRAVENOUS at 12:04

## 2025-04-23 RX ADMIN — MORPHINE SULFATE 2 MG: 2 INJECTION, SOLUTION INTRAMUSCULAR; INTRAVENOUS at 10:04

## 2025-04-23 RX ADMIN — LEVETIRACETAM 500 MG: 500 TABLET, FILM COATED ORAL at 09:04

## 2025-04-23 RX ADMIN — HYDROCODONE BITARTRATE AND ACETAMINOPHEN 1 TABLET: 5; 325 TABLET ORAL at 12:04

## 2025-04-23 RX ADMIN — IOHEXOL 100 ML: 350 INJECTION, SOLUTION INTRAVENOUS at 04:04

## 2025-04-23 RX ADMIN — HYDROMORPHONE HYDROCHLORIDE 0.25 MG: 1 INJECTION, SOLUTION INTRAMUSCULAR; INTRAVENOUS; SUBCUTANEOUS at 10:04

## 2025-04-23 RX ADMIN — HYDROMORPHONE HYDROCHLORIDE 0.2 MG: 1 INJECTION, SOLUTION INTRAMUSCULAR; INTRAVENOUS; SUBCUTANEOUS at 12:04

## 2025-04-23 RX ADMIN — HYDROCODONE BITARTRATE AND ACETAMINOPHEN 1 TABLET: 5; 325 TABLET ORAL at 06:04

## 2025-04-23 RX ADMIN — MORPHINE SULFATE 2 MG: 2 INJECTION, SOLUTION INTRAMUSCULAR; INTRAVENOUS at 04:04

## 2025-04-23 RX ADMIN — LEVETIRACETAM 500 MG: 500 TABLET, FILM COATED ORAL at 08:04

## 2025-04-23 RX ADMIN — PANTOPRAZOLE SODIUM 40 MG: 40 INJECTION, POWDER, FOR SOLUTION INTRAVENOUS at 09:04

## 2025-04-23 RX ADMIN — SODIUM CHLORIDE: 9 INJECTION, SOLUTION INTRAVENOUS at 09:04

## 2025-04-23 RX ADMIN — SODIUM CHLORIDE: 9 INJECTION, SOLUTION INTRAVENOUS at 05:04

## 2025-04-23 RX ADMIN — LEVETIRACETAM 500 MG: 500 TABLET, FILM COATED ORAL at 01:04

## 2025-04-23 NOTE — ASSESSMENT & PLAN NOTE
"Patient is identified as having Systolic (HFrEF) heart failure that is Chronic. CHF is currently controlled. Latest ECHO performed and demonstrates- Results for orders placed during the hospital encounter of 01/17/24    Echo    Interpretation Summary    Left Ventricle: The left ventricle is normal in size. Normal wall thickness. There is concentric remodeling. Normal wall motion. There is low normal systolic function with a visually estimated ejection fraction of 50 - 55%. There is normal diastolic function.    Right Ventricle: Normal right ventricular cavity size. Wall thickness is normal. Right ventricle wall motion  is normal. Systolic function is normal.    Aortic Valve: There is moderate aortic valve sclerosis. There is moderate annular calcification present.    Mitral Valve: There is severe mitral annular calcification present.    Aorta: Aortic root is normal in size measuring 3.37 cm. Ascending aorta is normal measuring 3.51 cm.    Pulmonary Artery: The estimated pulmonary artery systolic pressure is 25 mmHg.    IVC/SVC: Normal venous pressure at 3 mmHg.  . Continue Beta Blocker and monitor clinical status closely. Monitor on telemetry. Patient is off CHF pathway.  Monitor strict Is&Os and daily weights.  Place on fluid restriction of 1.5 L. Cardiology is not consulted. Continue to stress to patient importance of self efficacy and  on diet for CHF. Last BNP reviewed- and noted below No results for input(s): "BNP", "BNPTRIAGEBLO" in the last 168 hours..          "

## 2025-04-23 NOTE — SUBJECTIVE & OBJECTIVE
Past Medical History:   Diagnosis Date    Anemia     Arthritis     Bleeding ulcer 07/2016    Chronic pain     DDD (degenerative disc disease), cervical     DDD (degenerative disc disease), lumbar     Depression     Disc degeneration, lumbosacral     Diverticulitis     Encounter for blood transfusion     Hypertension     IBS (irritable bowel syndrome)     Neuropathy of both feet     Seizures     none  since 2017    Umbilical hernia 2020       Past Surgical History:   Procedure Laterality Date    ARTHROPLASTY, HIP, GIRDLESTONE, POSTERIOR APPROACH Left 1/19/2024    Procedure: ARTHROPLASTY, HIP, GIRDLESTONE, POSTERIOR APPROACH;  Surgeon: Bulmaro Tellez MD;  [facility];  Service: Orthopedics;  Laterality: Left;    ARTHROPLASTY, HIP, GIRDLESTONE, POSTERIOR APPROACH Left 1/25/2024    Procedure: Irrigation and debridement left hip,;  Surgeon: Juanito Painting MD;  [facility];  Service: Orthopedics;  Laterality: Left;    ARTHROPLASTY, HIP, GIRDLESTONE, POSTERIOR APPROACH Left 2/15/2024    Procedure: Revision hip antibiotic spacer head exchange, left, lateral, peg board, depuy osteomed in room, vanc, ancef, txa,;  Surgeon: Bulmaro Tellez MD;  [facility];  Service: Orthopedics;  Laterality: Left;    BACK SURGERY      BREAST BIOPSY      BREAST SURGERY Right     lumpectomy    CAUDAL EPIDURAL STEROID INJECTION N/A 01/28/2022    Procedure: Injection-steroid-epidural-caudal;  Surgeon: Luzmaria Marcelino MD;  Location: [facility] OR;  Service: Pain Management;  Laterality: N/A;    COLONOSCOPY N/A 01/14/2022    Procedure: COLONOSCOPY;  Surgeon: Abby Landers MD;  Location: [facility];  Service: Endoscopy;  Laterality: N/A;    COLONOSCOPY N/A 11/30/2024    Procedure: COLONOSCOPY;  Surgeon: Marcio Nguyễn III, MD;  Location: [facility] ENDO;  Service: Endoscopy;  Laterality: N/A;    ENDOSCOPIC ULTRASOUND OF UPPER GASTROINTESTINAL TRACT N/A 07/21/2020    Procedure: ULTRASOUND,  UPPER GI TRACT, ENDOSCOPIC;  Surgeon: Marcio Nguyễn III, MD;  Location: [facility] ENDO;  Service: Endoscopy;  Laterality: N/A;    ENDOSCOPIC ULTRASOUND OF UPPER GASTROINTESTINAL TRACT N/A 9/27/2024    Procedure: ULTRASOUND, UPPER GI TRACT, ENDOSCOPIC;  Surgeon: Marcio Nguyễn III, MD;  Location: [facility] ENDO;  Service: Endoscopy;  Laterality: N/A;    ESOPHAGOGASTRODUODENOSCOPY N/A 01/14/2022    Procedure: EGD (ESOPHAGOGASTRODUODENOSCOPY);  Surgeon: Abby Landers MD;  Location: [facility] ENDO;  Service: Endoscopy;  Laterality: N/A;    ESOPHAGOGASTRODUODENOSCOPY N/A 06/10/2022    Procedure: EGD (ESOPHAGOGASTRODUODENOSCOPY);  Surgeon: Abby Landers MD;  Location: [facility] ENDO;  Service: Endoscopy;  Laterality: N/A;    EYE SURGERY      cataract    FLAP GRAFT, LOCAL Left 2/15/2024    Procedure: FLAP GRAFT, LOCAL;  Surgeon: Bulmaro Tellez MD;  Location: [facility] OR 2ND FLR;  Service: Orthopedics;  Laterality: Left;    HYSTERECTOMY      INTRAMEDULLARY RODDING OF TROCHANTER OF FEMUR Left 12/11/2018    Procedure: INSERTION, INTRAMEDULLARY SANTOS, FEMUR, TROCHANTER;  Surgeon: Eulalio De La Cruz MD;  Location: [facility];  Service: Orthopedics;  Laterality: Left;    IRRIGATION AND DEBRIDEMENT OF LOWER EXTREMITY Left 1/19/2024    Procedure: IRRIGATION AND DEBRIDEMENT, LOWER EXTREMITY;  Surgeon: Bulmaro Tellez MD;  Location: [facility] OR 2ND FLR;  Service: Orthopedics;  Laterality: Left;    IRRIGATION AND DEBRIDEMENT OF LOWER EXTREMITY Left 2/15/2024    Procedure: IRRIGATION AND DEBRIDEMENT, LOWER EXTREMITY;  Surgeon: Bulmaro Tellez MD;  Location: [facility] OR 2ND FLR;  Service: Orthopedics;  Laterality: Left;    LAPAROSCOPIC CHOLECYSTECTOMY N/A 9/8/2024    Procedure: CHOLECYSTECTOMY, LAPAROSCOPIC;  Surgeon: Cipriano Ambrosio MD;  Location: [facility];  Service: General;  Laterality: N/A;    REPAIR OF EPIGASTRIC HERNIA N/A 12/7/2023    Procedure: REPAIR, HERNIA, EPIGASTRIC;  Surgeon: Vipul Escobar MD;  Location: [facility];   Service: General;  Laterality: N/A;    SPINAL CORD STIMULATOR IMPLANT  09/18/2013    and removal    SPINE SURGERY  2006    lumbar L2-S1 decompression.    SPINE SURGERY      cervical decompression    TONSILLECTOMY         Review of patient's allergies indicates:  No Known Allergies    No current facility-administered medications on file prior to encounter.     Current Outpatient Medications on File Prior to Encounter   Medication Sig    acetaminophen (TYLENOL) 325 MG tablet Take 2 tablets (650 mg total) by mouth every 8 (eight) hours as needed for Pain (pain scale 1-4).    amlodipine-benazepril 5-20 mg (LOTREL) 5-20 mg per capsule Take 1 capsule by mouth once daily.    cyanocobalamin (VITAMIN B-12) 1000 MCG tablet Take 0.5 tablets (500 mcg total) by mouth once daily.    cyanocobalamin 1,000 mcg/mL injection Inject 1 mL (1,000 mcg total) into the skin once daily.    cyclobenzaprine (FLEXERIL) 10 MG tablet Take 1 tablet (10 mg total) by mouth 3 (three) times daily as needed for Muscle spasms.    docusate sodium (COLACE) 100 MG capsule Take 1 capsule (100 mg total) by mouth 2 (two) times daily.    famotidine (PEPCID) 20 MG tablet Take 20 mg by mouth daily as needed for Heartburn.    levETIRAcetam (KEPPRA) 500 MG Tab Take 1 tablet (500 mg total) by mouth 2 (two) times daily.    oxyCODONE-acetaminophen (PERCOCET)  mg per tablet Take 1 tablet by mouth every 4 (four) hours as needed for Pain.    pregabalin (LYRICA) 200 MG Cap Take 1 capsule (200 mg total) by mouth 3 (three) times daily.    [DISCONTINUED] pantoprazole (PROTONIX) 40 MG tablet Take 1 tablet (40 mg total) by mouth 2 (two) times daily.     Family History       Problem Relation (Age of Onset)    COPD Brother    Coronary artery disease Father    Depression Father    Diabetes Mother, Father, Sister, Brother    Heart disease Father    Hypertension Mother, Father    Irritable bowel syndrome Mother          Tobacco Use    Smoking status: Never    Smokeless  tobacco: Never   Substance and Sexual Activity    Alcohol use: No    Drug use: No    Sexual activity: Not Currently     Review of Systems   Constitutional:  Positive for appetite change and fatigue. Negative for activity change, chills and diaphoresis.   HENT:  Negative for congestion.    Respiratory:  Negative for cough and shortness of breath.    Cardiovascular:  Positive for leg swelling (Chronic left leg swelling). Negative for chest pain.   Gastrointestinal:  Positive for abdominal pain (epigastric and left lower quadrant) and nausea. Negative for abdominal distention, blood in stool, constipation, diarrhea and vomiting.   Genitourinary:  Negative for difficulty urinating, flank pain and hematuria.   Musculoskeletal:  Positive for arthralgias. Negative for back pain.   Skin:  Positive for pallor.   Neurological:  Positive for weakness. Negative for dizziness, seizures, syncope, facial asymmetry, speech difficulty, light-headedness, numbness and headaches.     Objective:     Vital Signs (Most Recent):  Temp: 97.8 °F (36.6 °C) (04/23/25 0108)  Pulse: 74 (04/23/25 0200)  Resp: 16 (04/23/25 0200)  BP: 137/65 (04/23/25 0200)  SpO2: 98 % (04/23/25 0200) Vital Signs (24h Range):  Temp:  [97.2 °F (36.2 °C)-98.8 °F (37.1 °C)] 97.8 °F (36.6 °C)  Pulse:  [72-91] 74  Resp:  [14-33] 16  SpO2:  [95 %-100 %] 98 %  BP: (125-168)/(64-80) 137/65     Weight: 60.8 kg (134 lb)  Body mass index is 21.63 kg/m².     Physical Exam  Vitals reviewed.   Constitutional:       Appearance: She is ill-appearing.   Eyes:      Pupils: Pupils are equal, round, and reactive to light.   Cardiovascular:      Rate and Rhythm: Normal rate and regular rhythm.      Pulses: Normal pulses.      Heart sounds: Normal heart sounds.   Pulmonary:      Effort: Pulmonary effort is normal. No respiratory distress.      Breath sounds: Normal breath sounds. No wheezing.   Abdominal:      General: Bowel sounds are normal. There is no distension.      Palpations:  Abdomen is soft.      Tenderness: There is abdominal tenderness in the epigastric area and left lower quadrant. There is no guarding.   Musculoskeletal:      Right lower le+ Edema present.      Left lower le+ Edema present.   Skin:     General: Skin is warm and dry.      Capillary Refill: Capillary refill takes less than 2 seconds.   Neurological:      Mental Status: She is alert.      GCS: GCS eye subscore is 4. GCS verbal subscore is 5. GCS motor subscore is 6.              CRANIAL NERVES     CN III, IV, VI   Pupils are equal, round, and reactive to light.       Significant Labs: All pertinent labs within the past 24 hours have been reviewed.  A1C:   Recent Labs   Lab 25  1050   HGBA1C 5.2     Bilirubin:   Recent Labs   Lab 25  1050 25  1215 25  0420 25  1041 25  2155   BILITOT 0.2 0.3 0.4 0.3 0.2       BMP:   Recent Labs   Lab 25  2155      K 4.1   CO2 23   BUN 23   CREATININE 0.9   CALCIUM 9.2     CBC:   Recent Labs   Lab 25  1041 25  2155   WBC 5.85 6.32   HGB 7.4* 7.8*   HCT 25.4* 26.4*   * 542*     CMP:   Recent Labs   Lab 25  1041 25  2155    138   K 4.4 4.1   CO2 23 23   BUN 19 23   CREATININE 0.8 0.9   CALCIUM 9.0 9.2   ALBUMIN 3.7 3.8   BILITOT 0.3 0.2   ALKPHOS 72 87   AST 13 15   ALT 14 15       Lipase:   Recent Labs   Lab 25  2155   LIPASE 40       TSH:   Recent Labs   Lab 25  1050   TSH 0.127*       Urine Studies:   Recent Labs   Lab 25  0943   COLORU Yellow   PHUR 7.0   SPECGRAV 1.015   KETONESU Negative   NITRITE Negative   UROBILINOGEN 0.2       Significant Imaging: I have reviewed all pertinent imaging results/findings within the past 24 hours.

## 2025-04-23 NOTE — HPI
Froilan Ray is a 79 year old female with a past medical history of colitis with ulceration erosive gastritis, FAISAL, degenerative joint disease, diverticulitis, hypertension, irritable bowel syndrome, seizures, atrial fibrillation without any anticoagulation therapy (history of taking amiodarone and Eliquis), HFrEF 50% in 2022,  chronic left lower extremity swelling on p.r.n. Lasix, and neuropathy with surgical history of cholecystectomy, and strangulated abdominal wall hernia repair who presented with complaints of left lower quadrant and epigastric abdominal pain associated with generalized fatigue for 2 weeks. She reports her PCP instructed her to present to the ED due to a hemoglobin 7.4 on 04/13 hemoglobin 9.0.  She denies chest pain, shortness of breath, fever, chills, dizziness, lightheadedness, diarrhea, hematemesis, melena, hematochezia, or vomiting. ED workup reveals: CTA abdomen pending.  CBC with white count 6.3 and H&H 7.8/26.4. Stool for occult blood negative. She is hemodynamically stable on room air. She received 1 unit packed red blood cells in ED. Admitted to hospital medicine for further management and treatment.

## 2025-04-23 NOTE — FIRST PROVIDER EVALUATION
Emergency Department TeleTriage Encounter Note      CHIEF COMPLAINT    Chief Complaint   Patient presents with    Abnormal labs    Fatigue     Patient c/o fatigue and states that she was told to come to ER  for abnormal labs       VITAL SIGNS   Initial Vitals [04/22/25 2040]   BP Pulse Resp Temp SpO2   125/72 91 18 98.7 °F (37.1 °C) 100 %      MAP       --            ALLERGIES    Review of patient's allergies indicates:  No Known Allergies    PROVIDER TRIAGE NOTE  Verbal consent for the teletriage evaluation was given by the patient at the start of the evaluation.  All efforts will be made to maintain patient's privacy during the evaluation.      This is a teletriage evaluation of a 79 y.o. female presenting to the ED with c/o fatigue, generalized weakness that started 1 week ago.  Called and told to come to the ED for a blood transfusion.  Limited physical exam via telehealth: The patient is awake, alert, answering questions appropriately and is not in respiratory distress.  As the Teletriage provider, I performed an initial assessment and ordered appropriate labs and imaging studies, if any, to facilitate the patient's care once placed in the ED. Once a room is available, care and a full evaluation will be completed by an alternate ED provider.  Any additional orders and the final disposition will be determined by that provider.  All imaging and labs will not be followed-up by the Teletriage Team, including myself.          ORDERS  Labs Reviewed   CBC W/ AUTO DIFFERENTIAL    Narrative:     The following orders were created for panel order CBC auto differential.  Procedure                               Abnormality         Status                     ---------                               -----------         ------                     CBC with Differential[[id number]]                                                        Please view results for these tests on the individual orders.   COMPREHENSIVE METABOLIC PANEL    URINALYSIS, REFLEX TO URINE CULTURE   CBC WITH DIFFERENTIAL   TYPE & SCREEN       ED Orders (720h ago, onward)      Start Ordered     Status Ordering Provider    04/22/25 2050 04/22/25 2050  Saline lock IV (18 ga. or larger)  Once         Ordered MARY, LEILA MAGALLON    04/22/25 2050 04/22/25 2050  2nd Saline lock IV (18 ga. or larger)  Once         Ordered MARY, LEILA MAGALLON    04/22/25 2050 04/22/25 2050  NPO (Ice Chips)  Once         Ordered MARY, LEILA MAGALLON    04/22/25 2050 04/22/25 2050  CBC auto differential  STAT         Ordered MARY, LEILA MAGALLON    04/22/25 2050 04/22/25 2050  Comprehensive metabolic panel  STAT         Ordered MARY, LEILA MAGALLON    04/22/25 2050 04/22/25 2050  Type & Screen  STAT         Ordered MARY, LEILA MAGALLON    04/22/25 2050 04/22/25 2050  Vital signs  Once         Ordered MARY, LEILA MAGALLON    04/22/25 2050 04/22/25 2050  Cardiac Monitoring - Adult  Continuous        Comments: Notify Physician If:    Ordered MARY, LEILA MAGALLON    04/22/25 2050 04/22/25 2050  Pulse Oximetry Continuous  Continuous         Ordered MARY, LEILA MAGALLON    04/22/25 2050 04/22/25 2050  EKG 12-lead  Once         Ordered MARY, LEILA MAGALLON    04/22/25 2050 04/22/25 2050  Urinalysis, Reflex to Urine Culture  STAT         Ordered MARY, LEILA MAGALLON    04/22/25 2050 04/22/25 2050  CBC with Differential  PROCEDURE ONCE         Ordered MARY LEILA MAGALLON              Virtual Visit Note: The provider triage portion of this emergency department evaluation and documentation was performed via [employer], a HIPAA-compliant telemedicine application, in concert with a tele-presenter in the room. A face to face patient evaluation with one of my colleagues will occur once the patient is placed in an emergency department room.      DISCLAIMER: This note was prepared with [employer] voice recognition transcription software. Garbled syntax, mangled pronouns, and other bizarre constructions may be attributed to that software system.

## 2025-04-23 NOTE — ASSESSMENT & PLAN NOTE
Anemia is likely due to Iron deficiency. Most recent hemoglobin and hematocrit are listed below.  Recent Labs     04/22/25  1041 04/22/25  2155   HGB 7.4* 7.8*   HCT 25.4* 26.4*     Plan  - Monitor serial CBC: Daily  - Transfuse PRBC if patient becomes hemodynamically unstable, symptomatic or H/H drops below 7/21.  - Patient has received 1 units of PRBCs on 04/23  - Patient's anemia is currently stable

## 2025-04-23 NOTE — ASSESSMENT & PLAN NOTE
Patient has paroxysmal (<7 days) atrial fibrillation. Patient is currently in sinus rhythm. [id number] Score: 3. The patients heart rate in the last 24 hours is as follows:  Pulse  Min: 72  Max: 91     Antiarrhythmics       Anticoagulants       Plan  - Replete lytes with a goal of K>4, Mg >2  - Patient is not anticoagulated due to unknown; history bleeding ulcerations  - Patient's afib is currently not in AFib

## 2025-04-23 NOTE — ASSESSMENT & PLAN NOTE
Patient has paroxysmal (<7 days) atrial fibrillation. Patient is currently in sinus rhythm. [id number] Score: 3. The patients heart rate in the last 24 hours is as follows:  Pulse  Min: 72  Max: 91     Antiarrhythmics       Anticoagulants       Plan  - Replete lytes with a goal of K>4, Mg >2  - Patient is not anticoagulated due to unknown; history bleeding ulcerations  - Patient's afib is currently not in AFib

## 2025-04-23 NOTE — PLAN OF CARE
Problem: Gastrointestinal Bleeding  Goal: Optimal Coping with Acute Illness  Outcome: Ongoing  Goal: Hemostasis  Outcome: Ongoing     Problem: Adult Inpatient Plan of Care  Goal: Plan of Care Review  Outcome: Ongoing  Flowsheets (Taken 4/23/2025 1804)  Plan of Care Reviewed With: patient  Goal: Patient-Specific Goal (Individualized)  Outcome: Ongoing  Goal: Absence of Hospital-Acquired Illness or Injury  Outcome: Ongoing  Goal: Optimal Comfort and Wellbeing  Outcome: Ongoing  Goal: Readiness for Transition of Care  Outcome: Ongoing     Problem: Acute Kidney Injury/Impairment  Goal: Fluid and Electrolyte Balance  Outcome: Ongoing  Goal: Improved Oral Intake  Outcome: Ongoing  Goal: Effective Renal Function  Outcome: Ongoing     Problem: Fall Injury Risk  Goal: Absence of Fall and Fall-Related Injury  Outcome: Ongoing

## 2025-04-23 NOTE — PROGRESS NOTES
"[facility] - Emergency Dept  Hospital Medicine  Progress Note    Patient Name: Froilan Ray  MRN: [id number]  Patient Class: IP- Inpatient   Admission Date: 4/22/2025  Length of Stay: 1 days  Attending Physician: Isaac Valenzuela MD  Primary Care Provider: Alphonse Boothe III, MD        Subjective     Principal Problem:Symptomatic anemia        HPI:  Froilan Ray is a 79 year old female with a past medical history of colitis with ulceration erosive gastritis, FAISAL, degenerative joint disease, diverticulitis, hypertension, irritable bowel syndrome, seizures, atrial fibrillation without any anticoagulation therapy (history of taking amiodarone and Eliquis), HFrEF 50% in 2022,  chronic left lower extremity swelling on p.r.n. Lasix, and neuropathy with surgical history of cholecystectomy, and strangulated abdominal wall hernia repair who presented with complaints of left lower quadrant and epigastric abdominal pain associated with generalized fatigue for 2 weeks. She reports her PCP instructed her to present to the ED due to a hemoglobin 7.4 on 04/13 hemoglobin 9.0.  She denies chest pain, shortness of breath, fever, chills, dizziness, lightheadedness, diarrhea, hematemesis, melena, hematochezia, or vomiting. ED workup reveals: CTA abdomen pending.  CBC with white count 6.3 and H&H 7.8/26.4. Stool for occult blood negative. She is hemodynamically stable on room air. She received 1 unit packed red blood cells in ED. Admitted to hospital medicine for further management and treatment.          Overview/Hospital Course:  No notes on file    Interval History: feeling a little better this morning.  Has chronic bilateral lower extremity pain that is flaring up.  CT abd/pelvis shows "mild perigastric haziness that may relate to mild edema.  Clinical and historical correlation is needed, consideration to follow-up with upper GI examination or endoscopy is recommended.  The possibly of gastritis or gastric " "ulcer disease is to be considered, as would be an infiltrating process."  GI consulted.    Review of Systems   Constitutional:  Positive for appetite change and fatigue. Negative for activity change, chills and diaphoresis.   HENT:  Negative for congestion.    Respiratory:  Negative for cough and shortness of breath.    Cardiovascular:  Positive for leg swelling (Chronic left leg swelling). Negative for chest pain.   Gastrointestinal:  Positive for abdominal pain (epigastric and left lower quadrant) and nausea. Negative for abdominal distention, blood in stool, constipation, diarrhea and vomiting.   Genitourinary:  Negative for difficulty urinating, flank pain and hematuria.   Musculoskeletal:  Positive for arthralgias. Negative for back pain.   Skin:  Positive for pallor.   Neurological:  Positive for weakness. Negative for syncope and speech difficulty.     Objective:     Vital Signs (Most Recent):  Temp: 97.6 °F (36.4 °C) (04/23/25 0331)  Pulse: 78 (04/23/25 0940)  Resp: 19 (04/23/25 1040)  BP: 124/69 (04/23/25 0940)  SpO2: 96 % (04/23/25 0940) Vital Signs (24h Range):  Temp:  [97.2 °F (36.2 °C)-98.7 °F (37.1 °C)] 97.6 °F (36.4 °C)  Pulse:  [72-91] 78  Resp:  [14-33] 19  SpO2:  [85 %-100 %] 96 %  BP: (121-161)/(64-87) 124/69     Weight: 60.8 kg (134 lb)  Body mass index is 21.63 kg/m².    Intake/Output Summary (Last 24 hours) at 4/23/2025 1044  Last data filed at 4/23/2025 0332  Gross per 24 hour   Intake 733.25 ml   Output --   Net 733.25 ml         Physical Exam  Vitals reviewed.   Constitutional:       Appearance: She is ill-appearing.   Eyes:      Pupils: Pupils are equal, round, and reactive to light.   Cardiovascular:      Rate and Rhythm: Normal rate and regular rhythm.      Pulses: Normal pulses.      Heart sounds: Normal heart sounds.   Pulmonary:      Effort: Pulmonary effort is normal. No respiratory distress.      Breath sounds: Normal breath sounds. No wheezing.   Abdominal:      General: Bowel " sounds are normal. There is no distension.      Palpations: Abdomen is soft.      Tenderness: There is abdominal tenderness in the epigastric area and left lower quadrant. There is no guarding.   Musculoskeletal:      Right lower le+ Edema present.      Left lower le+ Edema present.   Skin:     General: Skin is warm and dry.      Capillary Refill: Capillary refill takes less than 2 seconds.   Neurological:      Mental Status: She is alert.      GCS: GCS eye subscore is 4. GCS verbal subscore is 5. GCS motor subscore is 6.               Significant Labs: All pertinent labs within the past 24 hours have been reviewed.  CBC:   Recent Labs   Lab 25  10425  0350   WBC 5.85 6.32 5.04   HGB 7.4* 7.8* 8.1*   HCT 25.4* 26.4* 27.1*   * 542* 391     CMP:   Recent Labs   Lab 25  10425  0350    138 138   K 4.4 4.1 4.7   CO2 23 23 20*   BUN 19 23 19   CREATININE 0.8 0.9 0.7   CALCIUM 9.0 9.2 8.6*   ALBUMIN 3.7 3.8 3.4*   BILITOT 0.3 0.2 0.4   ALKPHOS 72 87 69   AST 13 15 20   ALT 14 15 13       Significant Imaging: I have reviewed all pertinent imaging results/findings within the past 24 hours.      Assessment & Plan  Symptomatic anemia  Anemia is likely due to Iron deficiency. Most recent hemoglobin and hematocrit are listed below.  Recent Labs     25  1041 25  0350   HGB 7.4* 7.8* 8.1*   HCT 25.4* 26.4* 27.1*     Plan  - Monitor serial CBC: Daily  - Transfuse PRBC if patient becomes hemodynamically unstable, symptomatic or H/H drops below 7/21.  - Patient has received 1 units of PRBCs on   - Patient's anemia is currently stable    Essential hypertension  Patient's blood pressure range in the last 24 hours was: BP  Min: 121/66  Max: 161/77.The patient's inpatient anti-hypertensive regimen is listed below:  Current Antihypertensives  amLODIPine tablet 5 mg, Daily, Oral    Plan  - BP is controlled, no changes needed to  their regimen    Paroxysmal atrial fibrillation  Patient has paroxysmal (<7 days) atrial fibrillation. Patient is currently in sinus rhythm. [id number] Score: 3. The patients heart rate in the last 24 hours is as follows:  Pulse  Min: 72  Max: 91     Antiarrhythmics       Anticoagulants       Plan  - Replete lytes with a goal of K>4, Mg >2  - Patient is not anticoagulated due to unknown; history bleeding ulcerations  - Patient's afib is currently  not in AFib    Chronic heart failure with preserved ejection fraction  Patient is identified as having Systolic (HFrEF) heart failure that is Chronic. CHF is currently controlled. Latest ECHO performed and demonstrates- Results for orders placed during the hospital encounter of 01/17/24    Echo    Interpretation Summary    Left Ventricle: The left ventricle is normal in size. Normal wall thickness. There is concentric remodeling. Normal wall motion. There is low normal systolic function with a visually estimated ejection fraction of 50 - 55%. There is normal diastolic function.    Right Ventricle: Normal right ventricular cavity size. Wall thickness is normal. Right ventricle wall motion  is normal. Systolic function is normal.    Aortic Valve: There is moderate aortic valve sclerosis. There is moderate annular calcification present.    Mitral Valve: There is severe mitral annular calcification present.    Aorta: Aortic root is normal in size measuring 3.37 cm. Ascending aorta is normal measuring 3.51 cm.    Pulmonary Artery: The estimated pulmonary artery systolic pressure is 25 mmHg.    IVC/SVC: Normal venous pressure at 3 mmHg.  . Continue Beta Blocker and monitor clinical status closely. Monitor on telemetry. Patient is off CHF pathway.  Monitor strict Is&Os and daily weights.  Place on fluid restriction of 1.5 L. Cardiology is not consulted. Continue to stress to patient importance of self efficacy and  on diet for CHF. Last BNP reviewed- and noted below No  "results for input(s): "BNP", "BNPTRIAGEBLO" in the last 168 hours..          Diverticulitis  -chronic condition noted  -CT did not show acute diverticulitis    VTE Risk Mitigation (From admission, onward)           Ordered     Place sequential compression device  Until discontinued         04/22/25 2352     IP VTE HIGH RISK PATIENT  Once         04/22/25 2354                    Discharge Planning   BAILEY: 4/25/2025     Code Status: Full Code   Medical Readiness for Discharge Date:                    Please place Justification for DME        CHRISTAL Robison  Department of Hospital Medicine   [facility] [place] - Emergency Dept    "

## 2025-04-23 NOTE — H&P
[facility] - Emergency Dept  Hospital Medicine  History & Physical    Patient Name: Froilan Ray  MRN: [id number]  Patient Class: IP- Inpatient  Admission Date: 4/22/2025  Attending Physician: Wally Jaffe MD   Primary Care Provider: Alphonse Boothe III, MD         Patient information was obtained from patient, past medical records, and ER records.     Subjective:     Principal Problem:Symptomatic anemia    Chief Complaint:   Chief Complaint   Patient presents with    Abnormal labs    Fatigue     Patient c/o fatigue and states that she was told to come to ER  for abnormal labs        HPI: Froilan Ray is a 79 year old female with a past medical history of colitis with ulceration erosive gastritis, FAISAL, degenerative joint disease, diverticulitis, hypertension, irritable bowel syndrome, seizures, atrial fibrillation without any anticoagulation therapy (history of taking amiodarone and Eliquis), HFrEF 50% in 2022,  chronic left lower extremity swelling on p.r.n. Lasix, and neuropathy with surgical history of cholecystectomy, and strangulated abdominal wall hernia repair who presented with complaints of left lower quadrant and epigastric abdominal pain associated with generalized fatigue for 2 weeks. She reports her PCP instructed her to present to the ED due to a hemoglobin 7.4 on 04/13 hemoglobin 9.0.  She denies chest pain, shortness of breath, fever, chills, dizziness, lightheadedness, diarrhea, hematemesis, melena, hematochezia, or vomiting. ED workup reveals: CTA abdomen pending.  CBC with white count 6.3 and H&H 7.8/26.4. Stool for occult blood negative. She is hemodynamically stable on room air. She received 1 unit packed red blood cells in ED. Admitted to hospital medicine for further management and treatment.          Past Medical History:   Diagnosis Date    Anemia     Arthritis     Bleeding ulcer 07/2016    Chronic pain     DDD (degenerative disc disease), cervical     DDD  (degenerative disc disease), lumbar     Depression     Disc degeneration, lumbosacral     Diverticulitis     Encounter for blood transfusion     Hypertension     IBS (irritable bowel syndrome)     Neuropathy of both feet     Seizures     none  since 2017    Umbilical hernia 2020       Past Surgical History:   Procedure Laterality Date    ARTHROPLASTY, HIP, GIRDLESTONE, POSTERIOR APPROACH Left 1/19/2024    Procedure: ARTHROPLASTY, HIP, GIRDLESTONE, POSTERIOR APPROACH;  Surgeon: Bulmaro Tellez MD;  Location: [facility] OR [facility] FLR;  Service: Orthopedics;  Laterality: Left;    ARTHROPLASTY, HIP, GIRDLESTONE, POSTERIOR APPROACH Left 1/25/2024    Procedure: Irrigation and debridement left hip,;  Surgeon: Juanito Painting MD;  Location: [facility] OR 2ND FLR;  Service: Orthopedics;  Laterality: Left;    ARTHROPLASTY, HIP, GIRDLESTONE, POSTERIOR APPROACH Left 2/15/2024    Procedure: Revision hip antibiotic spacer head exchange, left, lateral, peg board, depuy osteomed in room, Health system, ancef, txa,;  Surgeon: Bulmaro Tellez MD;  Location: [facility] OR [facility] FLR;  Service: Orthopedics;  Laterality: Left;    BACK SURGERY      BREAST BIOPSY      BREAST SURGERY Right     lumpectomy    CAUDAL EPIDURAL STEROID INJECTION N/A 01/28/2022    Procedure: Injection-steroid-epidural-caudal;  Surgeon: Luzmaria Marcelino MD;  Location: [facility] OR;  Service: Pain Management;  Laterality: N/A;    COLONOSCOPY N/A 01/14/2022    Procedure: COLONOSCOPY;  Surgeon: Abby Landers MD;  Location: [facility] ENDO;  Service: Endoscopy;  Laterality: N/A;    COLONOSCOPY N/A 11/30/2024    Procedure: COLONOSCOPY;  Surgeon: Marcio Nguyễn III, MD;  Location: [facility];  Service: Endoscopy;  Laterality: N/A;    ENDOSCOPIC ULTRASOUND OF UPPER GASTROINTESTINAL TRACT N/A 07/21/2020    Procedure: ULTRASOUND, UPPER GI TRACT, ENDOSCOPIC;  Surgeon: Marcio Nguyễn III, MD;  Location: [facility] ENDO;  Service: Endoscopy;  Laterality: N/A;    ENDOSCOPIC ULTRASOUND OF UPPER  GASTROINTESTINAL TRACT N/A 9/27/2024    Procedure: ULTRASOUND, UPPER GI TRACT, ENDOSCOPIC;  Surgeon: Marcio Nguyễn III, MD;  Location: [facility] ENDO;  Service: Endoscopy;  Laterality: N/A;    ESOPHAGOGASTRODUODENOSCOPY N/A 01/14/2022    Procedure: EGD (ESOPHAGOGASTRODUODENOSCOPY);  Surgeon: Abby Landers MD;  Location: [facility] ENDO;  Service: Endoscopy;  Laterality: N/A;    ESOPHAGOGASTRODUODENOSCOPY N/A 06/10/2022    Procedure: EGD (ESOPHAGOGASTRODUODENOSCOPY);  Surgeon: Abby Landers MD;  Location: [facility] ENDO;  Service: Endoscopy;  Laterality: N/A;    EYE SURGERY      cataract    FLAP GRAFT, LOCAL Left 2/15/2024    Procedure: FLAP GRAFT, LOCAL;  Surgeon: Bulmaro Tellez MD;  Location: [facility] OR 2ND FLR;  Service: Orthopedics;  Laterality: Left;    HYSTERECTOMY      INTRAMEDULLARY RODDING OF TROCHANTER OF FEMUR Left 12/11/2018    Procedure: INSERTION, INTRAMEDULLARY SANTOS, FEMUR, TROCHANTER;  Surgeon: Eulalio De La Cruz MD;  Location: [facility] OR;  Service: Orthopedics;  Laterality: Left;    IRRIGATION AND DEBRIDEMENT OF LOWER EXTREMITY Left 1/19/2024    Procedure: IRRIGATION AND DEBRIDEMENT, LOWER EXTREMITY;  Surgeon: Bulmaro Tellez MD;  Location: [facility] OR 2ND FLR;  Service: Orthopedics;  Laterality: Left;    IRRIGATION AND DEBRIDEMENT OF LOWER EXTREMITY Left 2/15/2024    Procedure: IRRIGATION AND DEBRIDEMENT, LOWER EXTREMITY;  Surgeon: Bulmaro Tellez MD;  Location: [facility] OR 2ND FLR;  Service: Orthopedics;  Laterality: Left;    LAPAROSCOPIC CHOLECYSTECTOMY N/A 9/8/2024    Procedure: CHOLECYSTECTOMY, LAPAROSCOPIC;  Surgeon: Cipriano Ambrosio MD;  Location: [facility] OR;  Service: General;  Laterality: N/A;    REPAIR OF EPIGASTRIC HERNIA N/A 12/7/2023    Procedure: REPAIR, HERNIA, EPIGASTRIC;  Surgeon: Vipul Escobar MD;  Location: [facility] OR;  Service: General;  Laterality: N/A;    SPINAL CORD STIMULATOR IMPLANT  09/18/2013    and removal    SPINE SURGERY  2006    lumbar L2-S1 decompression.    SPINE SURGERY       cervical decompression    TONSILLECTOMY         Review of patient's allergies indicates:  No Known Allergies    No current facility-administered medications on file prior to encounter.     Current Outpatient Medications on File Prior to Encounter   Medication Sig    acetaminophen (TYLENOL) 325 MG tablet Take 2 tablets (650 mg total) by mouth every 8 (eight) hours as needed for Pain (pain scale 1-4).    amlodipine-benazepril 5-20 mg (LOTREL) 5-20 mg per capsule Take 1 capsule by mouth once daily.    cyanocobalamin (VITAMIN B-12) 1000 MCG tablet Take 0.5 tablets (500 mcg total) by mouth once daily.    cyanocobalamin 1,000 mcg/mL injection Inject 1 mL (1,000 mcg total) into the skin once daily.    cyclobenzaprine (FLEXERIL) 10 MG tablet Take 1 tablet (10 mg total) by mouth 3 (three) times daily as needed for Muscle spasms.    docusate sodium (COLACE) 100 MG capsule Take 1 capsule (100 mg total) by mouth 2 (two) times daily.    famotidine (PEPCID) 20 MG tablet Take 20 mg by mouth daily as needed for Heartburn.    levETIRAcetam (KEPPRA) 500 MG Tab Take 1 tablet (500 mg total) by mouth 2 (two) times daily.    oxyCODONE-acetaminophen (PERCOCET)  mg per tablet Take 1 tablet by mouth every 4 (four) hours as needed for Pain.    pregabalin (LYRICA) 200 MG Cap Take 1 capsule (200 mg total) by mouth 3 (three) times daily.    [DISCONTINUED] pantoprazole (PROTONIX) 40 MG tablet Take 1 tablet (40 mg total) by mouth 2 (two) times daily.     Family History       Problem Relation (Age of Onset)    COPD Brother    Coronary artery disease Father    Depression Father    Diabetes Mother, Father, Sister, Brother    Heart disease Father    Hypertension Mother, Father    Irritable bowel syndrome Mother          Tobacco Use    Smoking status: Never    Smokeless tobacco: Never   Substance and Sexual Activity    Alcohol use: No    Drug use: No    Sexual activity: Not Currently     Review of Systems   Constitutional:  Positive for  appetite change and fatigue. Negative for activity change, chills and diaphoresis.   HENT:  Negative for congestion.    Respiratory:  Negative for cough and shortness of breath.    Cardiovascular:  Positive for leg swelling (Chronic left leg swelling). Negative for chest pain.   Gastrointestinal:  Positive for abdominal pain (epigastric and left lower quadrant) and nausea. Negative for abdominal distention, blood in stool, constipation, diarrhea and vomiting.   Genitourinary:  Negative for difficulty urinating, flank pain and hematuria.   Musculoskeletal:  Positive for arthralgias. Negative for back pain.   Skin:  Positive for pallor.   Neurological:  Positive for weakness. Negative for dizziness, seizures, syncope, facial asymmetry, speech difficulty, light-headedness, numbness and headaches.     Objective:     Vital Signs (Most Recent):  Temp: 97.8 °F (36.6 °C) (04/23/25 0108)  Pulse: 74 (04/23/25 0200)  Resp: 16 (04/23/25 0200)  BP: 137/65 (04/23/25 0200)  SpO2: 98 % (04/23/25 0200) Vital Signs (24h Range):  Temp:  [97.2 °F (36.2 °C)-98.8 °F (37.1 °C)] 97.8 °F (36.6 °C)  Pulse:  [72-91] 74  Resp:  [14-33] 16  SpO2:  [95 %-100 %] 98 %  BP: (125-168)/(64-80) 137/65     Weight: 60.8 kg (134 lb)  Body mass index is 21.63 kg/m².     Physical Exam  Vitals reviewed.   Constitutional:       Appearance: She is ill-appearing.   Eyes:      Pupils: Pupils are equal, round, and reactive to light.   Cardiovascular:      Rate and Rhythm: Normal rate and regular rhythm.      Pulses: Normal pulses.      Heart sounds: Normal heart sounds.   Pulmonary:      Effort: Pulmonary effort is normal. No respiratory distress.      Breath sounds: Normal breath sounds. No wheezing.   Abdominal:      General: Bowel sounds are normal. There is no distension.      Palpations: Abdomen is soft.      Tenderness: There is abdominal tenderness in the epigastric area and left lower quadrant. There is no guarding.   Musculoskeletal:      Right lower  le+ Edema present.      Left lower le+ Edema present.   Skin:     General: Skin is warm and dry.      Capillary Refill: Capillary refill takes less than 2 seconds.   Neurological:      Mental Status: She is alert.      GCS: GCS eye subscore is 4. GCS verbal subscore is 5. GCS motor subscore is 6.              CRANIAL NERVES     CN III, IV, VI   Pupils are equal, round, and reactive to light.       Significant Labs: All pertinent labs within the past 24 hours have been reviewed.  A1C:   Recent Labs   Lab 25  1050   HGBA1C 5.2     Bilirubin:   Recent Labs   Lab 25  1050 25  1215 25  0420 25  1041 25  2155   BILITOT 0.2 0.3 0.4 0.3 0.2       BMP:   Recent Labs   Lab 25  2155      K 4.1   CO2 23   BUN 23   CREATININE 0.9   CALCIUM 9.2     CBC:   Recent Labs   Lab 25  10425  2155   WBC 5.85 6.32   HGB 7.4* 7.8*   HCT 25.4* 26.4*   * 542*     CMP:   Recent Labs   Lab 25  1041 25  2155    138   K 4.4 4.1   CO2 23 23   BUN 19 23   CREATININE 0.8 0.9   CALCIUM 9.0 9.2   ALBUMIN 3.7 3.8   BILITOT 0.3 0.2   ALKPHOS 72 87   AST 13 15   ALT 14 15       Lipase:   Recent Labs   Lab 25  2155   LIPASE 40       TSH:   Recent Labs   Lab 25  1050   TSH 0.127*       Urine Studies:   Recent Labs   Lab 25  0943   COLORU Yellow   PHUR 7.0   SPECGRAV 1.015   KETONESU Negative   NITRITE Negative   UROBILINOGEN 0.2       Significant Imaging: I have reviewed all pertinent imaging results/findings within the past 24 hours.    Assessment/Plan:     Assessment & Plan  Symptomatic anemia  Anemia is likely due to Iron deficiency. Most recent hemoglobin and hematocrit are listed below.  Recent Labs     25  1041 25  2155   HGB 7.4* 7.8*   HCT 25.4* 26.4*     Plan  - Monitor serial CBC: Daily  - Transfuse PRBC if patient becomes hemodynamically unstable, symptomatic or H/H drops below 7/21.  - Patient has received 1 units of  PRBCs on 04/23  - Patient's anemia is currently stable    Essential hypertension  Patient's blood pressure range in the last 24 hours was: BP  Min: 125/72  Max: 168/78.The patient's inpatient anti-hypertensive regimen is listed below:  Current Antihypertensives  amLODIPine tablet 5 mg, Daily, Oral    Plan  - BP is controlled, no changes needed to their regimen    Paroxysmal atrial fibrillation  Patient has paroxysmal (<7 days) atrial fibrillation. Patient is currently in sinus rhythm. [id number] Score: 3. The patients heart rate in the last 24 hours is as follows:  Pulse  Min: 72  Max: 91     Antiarrhythmics       Anticoagulants       Plan  - Replete lytes with a goal of K>4, Mg >2  - Patient is not anticoagulated due to unknown; history bleeding ulcerations  - Patient's afib is currently  not in AFib    Chronic heart failure with preserved ejection fraction  Patient is identified as having Systolic (HFrEF) heart failure that is Chronic. CHF is currently controlled. Latest ECHO performed and demonstrates- Results for orders placed during the hospital encounter of 01/17/24    Echo    Interpretation Summary    Left Ventricle: The left ventricle is normal in size. Normal wall thickness. There is concentric remodeling. Normal wall motion. There is low normal systolic function with a visually estimated ejection fraction of 50 - 55%. There is normal diastolic function.    Right Ventricle: Normal right ventricular cavity size. Wall thickness is normal. Right ventricle wall motion  is normal. Systolic function is normal.    Aortic Valve: There is moderate aortic valve sclerosis. There is moderate annular calcification present.    Mitral Valve: There is severe mitral annular calcification present.    Aorta: Aortic root is normal in size measuring 3.37 cm. Ascending aorta is normal measuring 3.51 cm.    Pulmonary Artery: The estimated pulmonary artery systolic pressure is 25 mmHg.    IVC/SVC: Normal venous pressure at 3  "mmHg.  . Continue Beta Blocker and monitor clinical status closely. Monitor on telemetry. Patient is off CHF pathway.  Monitor strict Is&Os and daily weights.  Place on fluid restriction of 1.5 L. Cardiology is not consulted. Continue to stress to patient importance of self efficacy and  on diet for CHF. Last BNP reviewed- and noted below No results for input(s): "BNP", "BNPTRIAGEBLO" in the last 168 hours..          Diverticulitis  -chronic condition noted  -CTA abdomen pending    VTE Risk Mitigation (From admission, onward)           Ordered     Place sequential compression device  Until discontinued         04/22/25 2352     IP VTE HIGH RISK PATIENT  Once         04/22/25 2354                                    Pat Cardona DNP  Department of Hospital Medicine  [facility] - Emergency Dept          "

## 2025-04-23 NOTE — PT/OT/SLP EVAL
"Physical Therapy Evaluation    Patient Name:  Froilan Ray   MRN:  [id number]    Recommendations:     Discharge Recommendations: Moderate Intensity Therapy   Discharge Equipment Recommendations: none   Barriers to discharge:  Increased caregiver assistance required    Assessment:     Froilan Ray is a 79 y.o. female admitted with a medical diagnosis of Symptomatic anemia.  She presents with the following impairments/functional limitations: weakness, impaired endurance, impaired self care skills, impaired functional mobility, gait instability, impaired balance, decreased upper extremity function, decreased lower extremity function, pain, impaired cardiopulmonary response to activity .    Pt was seen in ED. She presented with generalized weakness, slowness of movements, pain with minimal touch of B legs, pain to abdomen and back.  Informal MMT revealed strength of at least 3/5 B LE. Pt required Mod A x2 for t/f to EOB requiring CGA for balance. Pt requested to return to supine due to weakness and increased  pain .  Pt and  were adamantly against Moderate Intensity therapy, choosing to return home w/o therapy as pt's  reported "I'll take care of her," this after pt explained his health challenges.    Rehab Prognosis: Good and Fair; patient would benefit from acute skilled PT services to address these deficits and reach maximum level of function.    Recent Surgery: * No surgery found *      Plan:     During this hospitalization, patient to be seen 5 x/week to address the identified rehab impairments via gait training, therapeutic activities, therapeutic exercises and progress toward the following goals:    Plan of Care Expires:  05/23/25    Subjective     Chief Complaint: pain   Patient/Family Comments/goals: Return home with   Pain/Comfort:  Pain Rating 1:  (Did  not rate)  Location - Side 1: Bilateral  Location 1: leg  Pain Addressed 1: Pre-medicate for activity, Reposition, Cessation of " Activity    Patients cultural, spiritual, Tenriism conflicts given the current situation:      Living Environment:  Pt lives with her   and son in mobile home with ramp access.  Prior to admission, patients level of function was Mod I /Supervision, ambulatory with RW. Her son  does much of household chores. Equipment used at home: walker, rolling, cane, quad, wheelchair, 3-in-1 commode, glucometer, blood pressure machine.  DME owned (not currently used): none.  Upon discharge, patient will have assistance from her family.    Objective:     Communicated with nurse prior to session.  Patient found supine with blood pressure cuff, telemetry, PureWick, pulse ox (continuous)  upon PT entry to room.    General Precautions: Standard, fall  Orthopedic Precautions:    Braces:    Respiratory Status: Room air    Exams:  Cognitive Exam:  Patient is oriented to Person, Place, Time, and Situation  RLE Strength: Informally assessed at least 3/5  LLE Strength: Informally assessed at 3/5  Pt usually manually advances L LE ,as on today.  Pt had MARTHA 2024    Functional Mobility:  Bed Mobility:     Supine to Sit: moderate assistance and of 2 persons  Sit to Supine: moderate assistance and of 2 persons  Balance: CGA for sitting EOB with lean to R      AM-PAC 6 CLICK MOBILITY  Total Score:        Treatment & Education:  Pt was educated on safety, use of call light, PT  POC/DC recommendations    Patient left supine with all lines intact, call button in reach, and  present.    GOALS:   Multidisciplinary Problems       Physical Therapy Goals          Problem: Physical Therapy    Goal Priority Disciplines Outcome Interventions   Physical Therapy Goal     PT, PT/OT     Description: Goals to be met by: 2025     Patient will increase functional independence with mobility by performin. Supine to sit with Stand-by Assistance  2. Sit to stand transfer with Stand-by Assistance  3. Gait  x 150  feet with Stand-by  Assistance using Rolling Walker.                          DME Justifications:  No DME recommended requiring DME justifications    History:     Past Medical History:   Diagnosis Date    Anemia     Arthritis     Bleeding ulcer 07/2016    Chronic pain     DDD (degenerative disc disease), cervical     DDD (degenerative disc disease), lumbar     Depression     Disc degeneration, lumbosacral     Diverticulitis     Encounter for blood transfusion     Hypertension     IBS (irritable bowel syndrome)     Neuropathy of both feet     Seizures     none  since 2017    Umbilical hernia 2020       Past Surgical History:   Procedure Laterality Date    ARTHROPLASTY, HIP, GIRDLESTONE, POSTERIOR APPROACH Left 1/19/2024    Procedure: ARTHROPLASTY, HIP, GIRDLESTONE, POSTERIOR APPROACH;  Surgeon: Bulmaro Tellez MD;  [facility];  Service: Orthopedics;  Laterality: Left;    ARTHROPLASTY, HIP, GIRDLESTONE, POSTERIOR APPROACH Left 1/25/2024    Procedure: Irrigation and debridement left hip,;  Surgeon: Juanito Painting MD;  [facility];  Service: Orthopedics;  Laterality: Left;    ARTHROPLASTY, HIP, GIRDLESTONE, POSTERIOR APPROACH Left 2/15/2024    Procedure: Revision hip antibiotic spacer head exchange, left, lateral, peg board, depuy osteomed in room, vanc, ancef, txa,;  Surgeon: Bulmaro Tellez MD;  [facility];  Service: Orthopedics;  Laterality: Left;    BACK SURGERY      BREAST BIOPSY      BREAST SURGERY Right     lumpectomy    CAUDAL EPIDURAL STEROID INJECTION N/A 01/28/2022    Procedure: Injection-steroid-epidural-caudal;  Surgeon: Luzmarai Marcelino MD;  Location: [facility] OR;  Service: Pain Management;  Laterality: N/A;    COLONOSCOPY N/A 01/14/2022    Procedure: COLONOSCOPY;  Surgeon: Abby Landers MD;  Location: [facility] ENDO;  Service: Endoscopy;  Laterality: N/A;    COLONOSCOPY N/A 11/30/2024    Procedure: COLONOSCOPY;  Surgeon: Marcio Nguyễn III, MD;  Location: [facility]  ENDO;  Service: Endoscopy;  Laterality: N/A;    ENDOSCOPIC ULTRASOUND OF UPPER GASTROINTESTINAL TRACT N/A 07/21/2020    Procedure: ULTRASOUND, UPPER GI TRACT, ENDOSCOPIC;  Surgeon: Marcio Nguyễn III, MD;  Location: [facility] ENDO;  Service: Endoscopy;  Laterality: N/A;    ENDOSCOPIC ULTRASOUND OF UPPER GASTROINTESTINAL TRACT N/A 9/27/2024    Procedure: ULTRASOUND, UPPER GI TRACT, ENDOSCOPIC;  Surgeon: Marcio Nguyễn III, MD;  Location: [facility] ENDO;  Service: Endoscopy;  Laterality: N/A;    ESOPHAGOGASTRODUODENOSCOPY N/A 01/14/2022    Procedure: EGD (ESOPHAGOGASTRODUODENOSCOPY);  Surgeon: Abby Landers MD;  Location: [facility] ENDO;  Service: Endoscopy;  Laterality: N/A;    ESOPHAGOGASTRODUODENOSCOPY N/A 06/10/2022    Procedure: EGD (ESOPHAGOGASTRODUODENOSCOPY);  Surgeon: Abby Landers MD;  Location: [facility] ENDO;  Service: Endoscopy;  Laterality: N/A;    EYE SURGERY      cataract    FLAP GRAFT, LOCAL Left 2/15/2024    Procedure: FLAP GRAFT, LOCAL;  Surgeon: Bulmaro Tellez MD;  Location: [facility] OR 2ND FLR;  Service: Orthopedics;  Laterality: Left;    HYSTERECTOMY      INTRAMEDULLARY RODDING OF TROCHANTER OF FEMUR Left 12/11/2018    Procedure: INSERTION, INTRAMEDULLARY SANTOS, FEMUR, TROCHANTER;  Surgeon: Eulalio De La Cruz MD;  Location: [facility] OR;  Service: Orthopedics;  Laterality: Left;    IRRIGATION AND DEBRIDEMENT OF LOWER EXTREMITY Left 1/19/2024    Procedure: IRRIGATION AND DEBRIDEMENT, LOWER EXTREMITY;  Surgeon: Bulmaro Tellez MD;  Location: [facility] OR 2ND FLR;  Service: Orthopedics;  Laterality: Left;    IRRIGATION AND DEBRIDEMENT OF LOWER EXTREMITY Left 2/15/2024    Procedure: IRRIGATION AND DEBRIDEMENT, LOWER EXTREMITY;  Surgeon: Bulmaro Tellez MD;  Location: [facility] OR 2ND FLR;  Service: Orthopedics;  Laterality: Left;    LAPAROSCOPIC CHOLECYSTECTOMY N/A 9/8/2024    Procedure: CHOLECYSTECTOMY, LAPAROSCOPIC;  Surgeon: Cipriano Ambrosio MD;  Location: [facility] OR;  Service: General;  Laterality: N/A;     REPAIR OF EPIGASTRIC HERNIA N/A 12/7/2023    Procedure: REPAIR, HERNIA, EPIGASTRIC;  Surgeon: Vipul Escobar MD;  Location: [facility];  Service: General;  Laterality: N/A;    SPINAL CORD STIMULATOR IMPLANT  09/18/2013    and removal    SPINE SURGERY  2006    lumbar L2-S1 decompression.    SPINE SURGERY      cervical decompression    TONSILLECTOMY         Time Tracking:     PT Received On: 04/23/25  PT Start Time: 1045     PT Stop Time: 1100  PT Total Time (min): 15 min     Billable Minutes: Evaluation 7 minutes  and Therapeutic Activity 8 minutes      04/23/2025

## 2025-04-23 NOTE — ASSESSMENT & PLAN NOTE
Anemia is likely due to Iron deficiency. Most recent hemoglobin and hematocrit are listed below.  Recent Labs     04/22/25  1041 04/22/25  2155 04/23/25  0350   HGB 7.4* 7.8* 8.1*   HCT 25.4* 26.4* 27.1*     Plan  - Monitor serial CBC: Daily  - Transfuse PRBC if patient becomes hemodynamically unstable, symptomatic or H/H drops below 7/21.  - Patient has received 1 units of PRBCs on 04/23  - Patient's anemia is currently stable

## 2025-04-23 NOTE — NURSING
Patient transferred to unit via stretcher, patient ambulated into room x 2, fluids continued, no complaints of pain or signs of distress noted, call light within easy reach, SR up x 2, bed alarm on and sounded when prompted.

## 2025-04-23 NOTE — PT/OT/SLP EVAL
Occupational Therapy   Evaluation    Name: Froilan Ray  MRN: [id number]  Admitting Diagnosis: Symptomatic anemia  Recent Surgery: * No surgery found *    The primary encounter diagnosis was Symptomatic anemia. Diagnoses of Fatigue and Gastrointestinal hemorrhage, unspecified gastrointestinal hemorrhage type were also pertinent to this visit.    Recommendations:     Discharge Recommendations: Moderate Intensity Therapy  Discharge Equipment Recommendations:  to be determined by next level of care  Barriers to discharge:  Decreased caregiver support (increased level of assist w/ ADLS and mobility; high risk for further decline, readmission and falls)    Assessment:     Froilan Ray is a 79 y.o. female with a medical diagnosis of Symptomatic anemia.  She presents with Performance deficits affecting function: weakness, impaired endurance, impaired self care skills, impaired functional mobility, gait instability, impaired balance, decreased upper extremity function, decreased lower extremity function, decreased safety awareness, decreased coordination, pain, decreased ROM, impaired coordination, impaired cardiopulmonary response to activity, impaired joint extensibility.      Pt presents low endurance/activity tolerance due to fatigue and weakness from not eating in last 24 hrs per her report. She was only able to endure OT eval.only this date. Continue with OT POC.       Rehab Prognosis: Good; patient would benefit from acute skilled OT services to address these deficits and reach maximum level of function.       Plan:     Patient to be seen 5 x/week to address the above listed problems via self-care/home management, therapeutic activities, therapeutic exercises  Plan of Care Expires: 05/23/25  Plan of Care Reviewed with: patient    Subjective     Chief Complaint: L hip joint feels like it jumps out when I am walking, its been happening for 2 weeks now.   Patient/Family Comments/goals: I db to know what's  going on?, I haven't eaten in 24 hrs and on am so weak, the acid ids eating my stomach.     Occupational Profile:  Living Environment: pt lives with spouse in  w/Ramp; WIS w/ small ledged to step over; shower chair; BSC next to bed.  Previous level of function: Indep feeding, g/hygiene, min A bathing and dressing via spouse; ambulated Mod I w. 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   Roles and Routines: receives assist from spouse.  Equipment Used at Home: wheelchair, walker, rolling, 3-in-1 commode, cane, straight, shower chair  Assistance upon Discharge: limited help from spouse per pt, he has cardiac issues and maybe having surgery soon.    Pain/Comfort:  Pain Rating 1: 8/10  Location - Side 1: Bilateral  Location - Orientation 1: lower  Location 1: back  Pain Addressed 1: Reposition, Distraction, Cessation of Activity, Nurse notified  Pain Rating Post-Intervention 1: 8/10  Pain Rating 2: 8/10  Location - Side 2: Left  Location 2: hip  Pain Addressed 2: Reposition, Distraction, Cessation of Activity, Nurse notified  Pain Rating Post-Intervention 2: 8/10    Patients cultural, spiritual, Hinduism conflicts given the current situation: no    Objective:     Communicated with: nurse prior to session.  Patient found HOB elevated with blood pressure cuff, telemetry, PureWick, peripheral IV, pulse ox (continuous) upon OT entry to room.    General Precautions: Standard, fall, NPO, seizure, hearing impaired  Orthopedic Precautions: N/A  Braces: N/A  Respiratory Status: Room air    Occupational Performance:    Bed Mobility:    Patient completed Scooting/Bridging with minimum assistance  Patient completed Supine to Sit with minimum assistance  Patient completed Sit to Supine with minimum assistance    Functional Mobility/Transfers:  Patient completed Sit <> Stand Transfer with minimum assistance  with  hand-held assist   Functional Mobility: unable to side step due to weakness and fatigue; pt performed seated lateral  scooting min along bed prior to returning to supine.     Activities of Daily Living:  Feeding:  NPO; simulated setup.    Grooming: stand by assistance to wash face seated EOB.  Lower Body Dressing: maximal assistance to don/doff shoes seated EOB  Toileting: purewick      Cognitive/Visual Perceptual:  Cognitive/Psychosocial Skills:     -       Oriented to: Person, Place, Time, and Situation   -       Follows Commands/attention:Follows one-step commands and Follows two-step commands  -       Communication: clear/fluent  -       Memory: NT  -       Safety awareness/insight to disability: impaired   -       Mood/Affect/Coping skills/emotional control: Anxious  Visual/Perceptual:      -Intact grossly    Physical Exam:  Balance:    -       siting; fair plus dynamic fair  standing: poor plus  dynamic: poor  Postural examination/scapula alignment:    -       Rounded shoulders  Dominant hand:    -       right  Upper Extremity Range of Motion:     -       Right Upper Extremity: WFL except shld limited  -       Left Upper Extremity: WFL except shld limited  Upper Extremity Strength:    -       Right Upper Extremity: Deficits: shld 2+/5, distally 3+/5  -       Left Upper Extremity: Deficits: same as above   Strength:    -       Right Upper Extremity: Deficits: fair plus  -       Left Upper Extremity: Deficits: fair plus  Fine Motor Coordination:    -       Impaired  Left hand, manipulation of objects   and Right hand, manipulation of objects      AMPAC 6 Click ADL:  AMPAC Total Score: 18    Treatment & Education:  Purpose of of and POC  Call don't fall, call bell use explained. Pt acknowledged.     Patient left HOB elevated (stretcher in ED)with all lines intact, call button in reach, and nurse notified    GOALS:   Multidisciplinary Problems       Occupational Therapy Goals          Problem: Occupational Therapy    Goal Priority Disciplines Outcome Interventions   Occupational Therapy Goal     OT, PT/OT Progressing     Description: Goals to be met by: 5/23/2025     Patient will increase functional independence with ADLs by performing:    UE Dressing with Modified [place].  LE Dressing with Minimal Assistance.  Grooming while seated with Modified [place].  Toileting from bedside commode with Modified [place] for hygiene and clothing management.   Supine to sit with Modified [place].  Step transfer with Modified [place]  Toilet transfer to bedside commode with Modified [place].  Increased functional strength to WNL for ADLS.  Upper extremity exercise program x10 reps per handout, with independence.                         DME Justifications:  No DME recommended requiring DME justifications    History:     Past Medical History:   Diagnosis Date    Anemia     Arthritis     Bleeding ulcer 07/2016    Chronic pain     DDD (degenerative disc disease), cervical     DDD (degenerative disc disease), lumbar     Depression     Disc degeneration, lumbosacral     Diverticulitis     Encounter for blood transfusion     Hypertension     IBS (irritable bowel syndrome)     Neuropathy of both feet     Seizures     none  since 2017    Umbilical hernia 2020         Past Surgical History:   Procedure Laterality Date    ARTHROPLASTY, HIP, GIRDLESTONE, POSTERIOR APPROACH Left 1/19/2024    Procedure: ARTHROPLASTY, HIP, GIRDLESTONE, POSTERIOR APPROACH;  Surgeon: Bulmaro Tellez MD;  Location: [address];  Service: Orthopedics;  Laterality: Left;    ARTHROPLASTY, HIP, GIRDLESTONE, POSTERIOR APPROACH Left 1/25/2024    Procedure: Irrigation and debridement left hip,;  Surgeon: Juanito Painting MD;  Location: [address];  Service: Orthopedics;  Laterality: Left;    ARTHROPLASTY, HIP, GIRDLESTONE, POSTERIOR APPROACH Left 2/15/2024    Procedure: Revision hip antibiotic spacer head exchange, left, lateral, peg board, depuy osteomed in room, vanc, ancef, txa,;  Surgeon: Bulmaro Tellez MD;  Location: [facility] OR  2ND FLR;  Service: Orthopedics;  Laterality: Left;    BACK SURGERY      BREAST BIOPSY      BREAST SURGERY Right     lumpectomy    CAUDAL EPIDURAL STEROID INJECTION N/A 01/28/2022    Procedure: Injection-steroid-epidural-caudal;  Surgeon: Luzmaria Marcelino MD;  Location: [facility] OR;  Service: Pain Management;  Laterality: N/A;    COLONOSCOPY N/A 01/14/2022    Procedure: COLONOSCOPY;  Surgeon: Abby Landers MD;  Location: [facility] ENDO;  Service: Endoscopy;  Laterality: N/A;    COLONOSCOPY N/A 11/30/2024    Procedure: COLONOSCOPY;  Surgeon: Marcio Nguyễn III, MD;  Location: [facility] ENDO;  Service: Endoscopy;  Laterality: N/A;    ENDOSCOPIC ULTRASOUND OF UPPER GASTROINTESTINAL TRACT N/A 07/21/2020    Procedure: ULTRASOUND, UPPER GI TRACT, ENDOSCOPIC;  Surgeon: Marcio Nguyễn III, MD;  Location: [facility] ENDO;  Service: Endoscopy;  Laterality: N/A;    ENDOSCOPIC ULTRASOUND OF UPPER GASTROINTESTINAL TRACT N/A 9/27/2024    Procedure: ULTRASOUND, UPPER GI TRACT, ENDOSCOPIC;  Surgeon: Marcio Nguyễn III, MD;  Location: [facility];  Service: Endoscopy;  Laterality: N/A;    ESOPHAGOGASTRODUODENOSCOPY N/A 01/14/2022    Procedure: EGD (ESOPHAGOGASTRODUODENOSCOPY);  Surgeon: Abby Landers MD;  Location: [facility] ENDO;  Service: Endoscopy;  Laterality: N/A;    ESOPHAGOGASTRODUODENOSCOPY N/A 06/10/2022    Procedure: EGD (ESOPHAGOGASTRODUODENOSCOPY);  Surgeon: Abby Landers MD;  Location: [facility];  Service: Endoscopy;  Laterality: N/A;    EYE SURGERY      cataract    FLAP GRAFT, LOCAL Left 2/15/2024    Procedure: FLAP GRAFT, LOCAL;  Surgeon: Bulmaro Tellez MD;  Location: [facility] OR 2ND FLR;  Service: Orthopedics;  Laterality: Left;    HYSTERECTOMY      INTRAMEDULLARY RODDING OF TROCHANTER OF FEMUR Left 12/11/2018    Procedure: INSERTION, INTRAMEDULLARY SANTOS, FEMUR, TROCHANTER;  Surgeon: Eulalio De La Cruz MD;  Location: [facility] OR;  Service: Orthopedics;  Laterality: Left;    IRRIGATION AND DEBRIDEMENT OF LOWER EXTREMITY  Left 1/19/2024    Procedure: IRRIGATION AND DEBRIDEMENT, LOWER EXTREMITY;  Surgeon: Bulmaro Tellez MD;  Location: [facility] OR 2ND FLR;  Service: Orthopedics;  Laterality: Left;    IRRIGATION AND DEBRIDEMENT OF LOWER EXTREMITY Left 2/15/2024    Procedure: IRRIGATION AND DEBRIDEMENT, LOWER EXTREMITY;  Surgeon: Bulmaro Tellez MD;  Location: [facility] OR 2ND FLR;  Service: Orthopedics;  Laterality: Left;    LAPAROSCOPIC CHOLECYSTECTOMY N/A 9/8/2024    Procedure: CHOLECYSTECTOMY, LAPAROSCOPIC;  Surgeon: Cipriano Ambrosio MD;  Location: [facility] OR;  Service: General;  Laterality: N/A;    REPAIR OF EPIGASTRIC HERNIA N/A 12/7/2023    Procedure: REPAIR, HERNIA, EPIGASTRIC;  Surgeon: Vipul Escobar MD;  Location: [facility];  Service: General;  Laterality: N/A;    SPINAL CORD STIMULATOR IMPLANT  09/18/2013    and removal    SPINE SURGERY  2006    lumbar L2-S1 decompression.    SPINE SURGERY      cervical decompression    TONSILLECTOMY         Time Tracking:     OT Date of Treatment: 04/23/25  OT Start Time: 1357  OT Stop Time: 1411  OT Total Time (min): 14 min    Billable Minutes:Evaluation 14  Total Time 14    4/23/2025

## 2025-04-23 NOTE — PLAN OF CARE
Problem: Occupational Therapy  Goal: Occupational Therapy Goal  Description: Goals to be met by: 5/23/2025     Patient will increase functional independence with ADLs by performing:    UE Dressing with Modified [place].  LE Dressing with Minimal Assistance.  Grooming while seated with Modified [place].  Toileting from bedside commode with Modified [place] for hygiene and clothing management.   Supine to sit with Modified [place].  Step transfer with Modified [place]  Toilet transfer to bedside commode with Modified [place].  Increased functional strength to WNL for ADLS.  Upper extremity exercise program x10 reps per handout, with independence.    Outcome: Progressing

## 2025-04-23 NOTE — ED PROVIDER NOTES
Encounter Date: 4/22/2025       History     Chief Complaint   Patient presents with    Abnormal labs    Fatigue     Patient c/o fatigue and states that she was told to come to ER  for abnormal labs     HPI    Patient is a 79-year-old female PMH of colitis with ulceration erosive gastritis who had a CBC done today which showed a hemoglobin of 7.4, she was told by her family medicine provider to come to the ER.  Her hemoglobin was 9 from 9 days ago.  She denies any melena, hematochezia, hemoptysis, hematemesis.  She does reports some left lower quadrant abdominal pain and epigastric pain for the past 2 weeks.  She reports no nausea or vomiting.  She does report increased fatigue and generalized weakness.  Denies any fever, cough, congestion, chest pain.  Review of patient's allergies indicates:  No Known Allergies  Past Medical History:   Diagnosis Date    Anemia     Arthritis     Bleeding ulcer 07/2016    Chronic pain     DDD (degenerative disc disease), cervical     DDD (degenerative disc disease), lumbar     Depression     Disc degeneration, lumbosacral     Diverticulitis     Encounter for blood transfusion     Hypertension     IBS (irritable bowel syndrome)     Neuropathy of both feet     Seizures     none  since 2017    Umbilical hernia 2020     Past Surgical History:   Procedure Laterality Date    ARTHROPLASTY, HIP, GIRDLESTONE, POSTERIOR APPROACH Left 1/19/2024    Procedure: ARTHROPLASTY, HIP, GIRDLESTONE, POSTERIOR APPROACH;  Surgeon: Bulmaro Tellez MD;  Location: [facility] OR [address] [place] [address];  Service: Orthopedics;  Laterality: Left;    ARTHROPLASTY, HIP, GIRDLESTONE, POSTERIOR APPROACH Left 1/25/2024    Procedure: Irrigation and debridement left hip,;  Surgeon: Juanito Painting MD;  Location: [facility] OR [address] [place] [address];  Service: Orthopedics;  Laterality: Left;    ARTHROPLASTY, HIP, GIRDLESTONE, POSTERIOR APPROACH Left 2/15/2024    Procedure: Revision hip antibiotic spacer head exchange, left, lateral, peg board, depuy  osteomed in room, [facility],;  Surgeon: Bulmaro Tellez MD;  Location: [facility] OR 2ND FLR;  Service: Orthopedics;  Laterality: Left;    BACK SURGERY      BREAST BIOPSY      BREAST SURGERY Right     lumpectomy    CAUDAL EPIDURAL STEROID INJECTION N/A 01/28/2022    Procedure: Injection-steroid-epidural-caudal;  Surgeon: Luzmaria Marcelino MD;  Location: [facility] OR;  Service: Pain Management;  Laterality: N/A;    COLONOSCOPY N/A 01/14/2022    Procedure: COLONOSCOPY;  Surgeon: Abby Landers MD;  Location: [facility] ENDO;  Service: Endoscopy;  Laterality: N/A;    COLONOSCOPY N/A 11/30/2024    Procedure: COLONOSCOPY;  Surgeon: Marcio Nguyễn III, MD;  Location: [facility] ENDO;  Service: Endoscopy;  Laterality: N/A;    ENDOSCOPIC ULTRASOUND OF UPPER GASTROINTESTINAL TRACT N/A 07/21/2020    Procedure: ULTRASOUND, UPPER GI TRACT, ENDOSCOPIC;  Surgeon: Marcio Nguyễn III, MD;  Location: [facility] ENDO;  Service: Endoscopy;  Laterality: N/A;    ENDOSCOPIC ULTRASOUND OF UPPER GASTROINTESTINAL TRACT N/A 9/27/2024    Procedure: ULTRASOUND, UPPER GI TRACT, ENDOSCOPIC;  Surgeon: Marcio Nguyễn III, MD;  Location: [facility] ENDO;  Service: Endoscopy;  Laterality: N/A;    ESOPHAGOGASTRODUODENOSCOPY N/A 01/14/2022    Procedure: EGD (ESOPHAGOGASTRODUODENOSCOPY);  Surgeon: Abby Landers MD;  Location: [facility] ENDO;  Service: Endoscopy;  Laterality: N/A;    ESOPHAGOGASTRODUODENOSCOPY N/A 06/10/2022    Procedure: EGD (ESOPHAGOGASTRODUODENOSCOPY);  Surgeon: Abby Landers MD;  Location: [facility] ENDO;  Service: Endoscopy;  Laterality: N/A;    EYE SURGERY      cataract    FLAP GRAFT, LOCAL Left 2/15/2024    Procedure: FLAP GRAFT, LOCAL;  Surgeon: Bulmaro Tellez MD;  Location: [facility] OR 2ND FLR;  Service: Orthopedics;  Laterality: Left;    HYSTERECTOMY      INTRAMEDULLARY RODDING OF TROCHANTER OF FEMUR Left 12/11/2018    Procedure: INSERTION, INTRAMEDULLARY SANTOS, FEMUR, TROCHANTER;  Surgeon: Eulalio De La Cruz MD;  Location: [facility] OR;   Service: Orthopedics;  Laterality: Left;    IRRIGATION AND DEBRIDEMENT OF LOWER EXTREMITY Left 1/19/2024    Procedure: IRRIGATION AND DEBRIDEMENT, LOWER EXTREMITY;  Surgeon: Bulmaro Tellez MD;  Location: [facility] OR 2ND FLR;  Service: Orthopedics;  Laterality: Left;    IRRIGATION AND DEBRIDEMENT OF LOWER EXTREMITY Left 2/15/2024    Procedure: IRRIGATION AND DEBRIDEMENT, LOWER EXTREMITY;  Surgeon: Bulmaro Tellez MD;  Location: [facility] OR 2ND FLR;  Service: Orthopedics;  Laterality: Left;    LAPAROSCOPIC CHOLECYSTECTOMY N/A 9/8/2024    Procedure: CHOLECYSTECTOMY, LAPAROSCOPIC;  Surgeon: Cipriano Ambrosio MD;  Location: [facility] OR;  Service: General;  Laterality: N/A;    REPAIR OF EPIGASTRIC HERNIA N/A 12/7/2023    Procedure: REPAIR, HERNIA, EPIGASTRIC;  Surgeon: Vipul Escobar MD;  Location: [facility] OR;  Service: General;  Laterality: N/A;    SPINAL CORD STIMULATOR IMPLANT  09/18/2013    and removal    SPINE SURGERY  2006    lumbar L2-S1 decompression.    SPINE SURGERY      cervical decompression    TONSILLECTOMY       Family History   Problem Relation Name Age of Onset    Diabetes Mother      Hypertension Mother      Irritable bowel syndrome Mother      Diabetes Father          insulin dependent    Hypertension Father      Heart disease Father      Coronary artery disease Father      Depression Father      Diabetes Sister      Diabetes Brother      COPD Brother       Social History[1]  Review of Systems  Pertinent ROS in the HPI  Physical Exam     Initial Vitals [04/22/25 2040]   BP Pulse Resp Temp SpO2   125/72 91 18 98.7 °F (37.1 °C) 100 %      MAP       --         Physical Exam    Nursing note and vitals reviewed.  Constitutional: She appears well-developed.   HENT:   Head: Normocephalic and atraumatic.   Eyes: Pupils are equal, round, and reactive to light.   Neck:   Normal range of motion.  Cardiovascular:  Normal rate and regular rhythm.     Exam reveals no friction rub.       No murmur heard.  Pulmonary/Chest:  No respiratory distress.   Abdominal:   Abdomen is soft, nondistended, mildly tender in the left lower quadrant   Genitourinary: Rectum:      Guaiac result negative.   Guaiac negative stool.    Genitourinary Comments: Negative MICHELET     Musculoskeletal:      Cervical back: Normal range of motion.     Neurological: She is alert and oriented to person, place, and time.   Skin: Skin is warm and dry. Capillary refill takes less than 2 seconds.         ED Course   Procedures  Labs Reviewed   COMPREHENSIVE METABOLIC PANEL - Abnormal       Result Value    Sodium 138      Potassium 4.1      Chloride 109      CO2 23      Glucose 110      BUN 23      Creatinine 0.9      Calcium 9.2      Protein Total 6.8      Albumin 3.8      Bilirubin Total 0.2      ALP 87      AST 15      ALT 15      Anion Gap 6 (*)     eGFR >60     CBC WITH DIFFERENTIAL - Abnormal    WBC 6.32      RBC 3.11 (*)     Hgb 7.8 (*)     Hct 26.4 (*)     MCV 85      MCH 25.1 (*)     MCHC 29.5 (*)     RDW 18.4 (*)     Platelet Count 542 (*)     MPV 9.6      Nucleated RBC 0      Neut % 59.7      Lymph % 25.8      Mono % 11.4      Eos % 2.5      Basophil % 0.3      Imm Grans % 0.3      Neut # 3.8      Lymph # 1.63      Mono # 0.72      Eos # 0.16      Baso # 0.02      Imm Grans # 0.02     OCCULT BLOOD X 1, STOOL - Normal    OCCULT BLOOD STOOL Negative     CBC W/ AUTO DIFFERENTIAL    Narrative:     The following orders were created for panel order CBC auto differential.  Procedure                               Abnormality         Status                     ---------                               -----------         ------                     CBC with Differential[[id number]]       Abnormal            Final result                 Please view results for these tests on the individual orders.   URINALYSIS, REFLEX TO URINE CULTURE   EXTRA TUBES    Narrative:     The following orders were created for panel order EXTRA TUBES.  Procedure                                Abnormality         Status                     ---------                               -----------         ------                     Light Blue Top Hold[[id number]]                             In process                 Lavender Top Hold[[id number]]                               In process                 Gold Top Hold[[id number]]                                   In process                   Please view results for these tests on the individual orders.   LIGHT BLUE TOP HOLD   LAVENDER TOP HOLD   GOLD TOP HOLD   TYPE & SCREEN   PREPARE RBC SOFT          Imaging Results    None          Medications   pantoprazole injection 80 mg (has no administration in time range)   0.9%  NaCl infusion (for blood administration) (has no administration in time range)     Medical Decision Making  HO-3 MDM  79-year-old female with history of GI bleed, who was told to come in by her family med doctor due to low hemoglobin of 7.4, few days prior was over 9.  No melena no hematochezia.  Patient is hemodynamically stable.  Hemoglobin in the ED 7.8.  Negative MICHELET.  Patient does endorse severe fatigue and generalized weakness.  Since she has symptomatic anemia and 2 point hemoglobin drop we will transfuse with 1 unit and admit to medicine.    Cipriano Lewis, PGY3  Emergency Medicine      Problems Addressed:  Fatigue: acute illness or injury  Gastrointestinal hemorrhage, unspecified gastrointestinal hemorrhage type: acute illness or injury  Symptomatic anemia: acute illness or injury    Amount and/or Complexity of Data Reviewed  External Data Reviewed: labs, radiology and notes.  Labs: ordered. Decision-making details documented in ED Course.  Radiology: ordered and independent interpretation performed. Decision-making details documented in ED Course.  ECG/medicine tests: ordered and independent interpretation performed. Decision-making details documented in ED Course.    Risk  Prescription drug management.  Decision regarding  hospitalization.                                      Clinical Impression:  Final diagnoses:  [R53.83] Fatigue  [D64.9] Symptomatic anemia (Primary)  [K92.2] Gastrointestinal hemorrhage, unspecified gastrointestinal hemorrhage type          ED Disposition Condition    Admit                     [1]   Social History  Tobacco Use    Smoking status: Never    Smokeless tobacco: Never   Substance Use Topics    Alcohol use: No    Drug use: No        Cipriano Lewis MD  Resident  04/22/25 2876

## 2025-04-23 NOTE — ASSESSMENT & PLAN NOTE
Patient's blood pressure range in the last 24 hours was: BP  Min: 121/66  Max: 161/77.The patient's inpatient anti-hypertensive regimen is listed below:  Current Antihypertensives  amLODIPine tablet 5 mg, Daily, Oral    Plan  - BP is controlled, no changes needed to their regimen

## 2025-04-23 NOTE — ASSESSMENT & PLAN NOTE
Patient's blood pressure range in the last 24 hours was: BP  Min: 125/72  Max: 168/78.The patient's inpatient anti-hypertensive regimen is listed below:  Current Antihypertensives  amLODIPine tablet 5 mg, Daily, Oral    Plan  - BP is controlled, no changes needed to their regimen

## 2025-04-23 NOTE — PLAN OF CARE
[facility] - Emergency Dept  Initial Discharge Assessment       Primary Care Provider: Alphonse Boothe III, MD    Assessment completed with patient and spouse Otoniel(LUIS) at bedside.  Patient lives with spouse- he helps to care for her at home (not interested in ).  Ramps are present to allow ease of getting into the house.  Patient uses rolling walker, cane, wheelchair, bsc, shower chair, bp cuff and glucometer.  PCP is Dr. Boothe,  preferred pharmacy is [facility] [address] [place].  Patients spouse Otoniel provides transportation for patient.      Admission Diagnosis: Symptomatic anemia [D64.9]    Admission Date: 4/22/2025  Expected Discharge Date: 4/25/2025    Transition of Care Barriers: None    Payor: MEDICARE / Plan: MEDICARE PART A & B / Product Type: Government /     Extended Emergency Contact Information  Primary Emergency Contact: Otoniel Ray  Address: [address] [place] [address] [facility]  Home Phone: [phone]  Mobile Phone: [phone]  Relation: Spouse  Secondary Emergency Contact: SAHRA MCGUIRE  Mobile Phone: [phone]  Relation: Son    Discharge Plan A: Home with family  Discharge Plan B: Home, Home Health      [id number] - [place] - [address]  [address]  [place] [address]  Phone: [phone] Fax: [phone]      Initial Assessment (most recent)       Adult Discharge Assessment - 04/23/25 1228          Discharge Assessment    Assessment Type Discharge Planning Assessment     Confirmed/corrected address, phone number and insurance Yes     Confirmed Demographics Correct on Facesheet     Source of Information patient;family     When was your last doctors appointment? 04/22/25     Reason For Admission symptomatic anemia     People in Home spouse     Facility Arrived From: home     Do you expect to return to your current living situation? Yes     Do you have help at home or someone to help you manage your care at home? Yes      Who are your caregiver(s) and their phone number(s)? Spouse Otoniel     Prior to hospitilization cognitive status: Alert/Oriented     Current cognitive status: Alert/Oriented     Walking or Climbing Stairs Difficulty yes     Walking or Climbing Stairs ambulation difficulty, requires equipment     Dressing/Bathing Difficulty yes     Dressing/Bathing bathing difficulty, requires equipment     Dressing/Bathing Management shower chair and bsc     Home Accessibility wheelchair accessible     Equipment Currently Used at Home walker, rolling;cane, quad;wheelchair;3-in-1 commode;glucometer;blood pressure machine     Readmission within 30 days? Yes     Patient currently being followed by outpatient case management? No     Do you currently have service(s) that help you manage your care at home? No     Do you take prescription medications? Yes     Do you have prescription coverage? Yes     Do you have any problems affording any of your prescribed medications? No     Is the patient taking medications as prescribed? yes     Who is going to help you get home at discharge? Spouse Otoniel     How do you get to doctors appointments? family or friend will provide     Are you on dialysis? No     Do you take coumadin? No     Discharge Plan A Home with family     Discharge Plan B Home;Home Health     DME Needed Upon Discharge  none     Discharge Plan discussed with: Patient;Spouse/sig other     Transition of Care Barriers None

## 2025-04-23 NOTE — SUBJECTIVE & OBJECTIVE
"Interval History: feeling a little better this morning.  Has chronic bilateral lower extremity pain that is flaring up.  CT abd/pelvis shows "mild perigastric haziness that may relate to mild edema.  Clinical and historical correlation is needed, consideration to follow-up with upper GI examination or endoscopy is recommended.  The possibly of gastritis or gastric ulcer disease is to be considered, as would be an infiltrating process."  GI consulted.    Review of Systems   Constitutional:  Positive for appetite change and fatigue. Negative for activity change, chills and diaphoresis.   HENT:  Negative for congestion.    Respiratory:  Negative for cough and shortness of breath.    Cardiovascular:  Positive for leg swelling (Chronic left leg swelling). Negative for chest pain.   Gastrointestinal:  Positive for abdominal pain (epigastric and left lower quadrant) and nausea. Negative for abdominal distention, blood in stool, constipation, diarrhea and vomiting.   Genitourinary:  Negative for difficulty urinating, flank pain and hematuria.   Musculoskeletal:  Positive for arthralgias. Negative for back pain.   Skin:  Positive for pallor.   Neurological:  Positive for weakness. Negative for syncope and speech difficulty.     Objective:     Vital Signs (Most Recent):  Temp: 97.6 °F (36.4 °C) (04/23/25 0331)  Pulse: 78 (04/23/25 0940)  Resp: 19 (04/23/25 1040)  BP: 124/69 (04/23/25 0940)  SpO2: 96 % (04/23/25 0940) Vital Signs (24h Range):  Temp:  [97.2 °F (36.2 °C)-98.7 °F (37.1 °C)] 97.6 °F (36.4 °C)  Pulse:  [72-91] 78  Resp:  [14-33] 19  SpO2:  [85 %-100 %] 96 %  BP: (121-161)/(64-87) 124/69     Weight: 60.8 kg (134 lb)  Body mass index is 21.63 kg/m².    Intake/Output Summary (Last 24 hours) at 4/23/2025 1044  Last data filed at 4/23/2025 0332  Gross per 24 hour   Intake 733.25 ml   Output --   Net 733.25 ml         Physical Exam  Vitals reviewed.   Constitutional:       Appearance: She is ill-appearing.   Eyes:      " Pupils: Pupils are equal, round, and reactive to light.   Cardiovascular:      Rate and Rhythm: Normal rate and regular rhythm.      Pulses: Normal pulses.      Heart sounds: Normal heart sounds.   Pulmonary:      Effort: Pulmonary effort is normal. No respiratory distress.      Breath sounds: Normal breath sounds. No wheezing.   Abdominal:      General: Bowel sounds are normal. There is no distension.      Palpations: Abdomen is soft.      Tenderness: There is abdominal tenderness in the epigastric area and left lower quadrant. There is no guarding.   Musculoskeletal:      Right lower le+ Edema present.      Left lower le+ Edema present.   Skin:     General: Skin is warm and dry.      Capillary Refill: Capillary refill takes less than 2 seconds.   Neurological:      Mental Status: She is alert.      GCS: GCS eye subscore is 4. GCS verbal subscore is 5. GCS motor subscore is 6.               Significant Labs: All pertinent labs within the past 24 hours have been reviewed.  CBC:   Recent Labs   Lab 25  1041 25  2155 25  0350   WBC 5.85 6.32 5.04   HGB 7.4* 7.8* 8.1*   HCT 25.4* 26.4* 27.1*   * 542* 391     CMP:   Recent Labs   Lab 25  1041 25  2155 25  0350    138 138   K 4.4 4.1 4.7   CO2 23 23 20*   BUN 19 23 19   CREATININE 0.8 0.9 0.7   CALCIUM 9.0 9.2 8.6*   ALBUMIN 3.7 3.8 3.4*   BILITOT 0.3 0.2 0.4   ALKPHOS 72 87 69   AST 13 15 20   ALT 14 15 13       Significant Imaging: I have reviewed all pertinent imaging results/findings within the past 24 hours.

## 2025-04-23 NOTE — PHARMACY MED REC
"              .        Admission Medication History     The home medication history was taken by María Vernon.    You may go to "Admission" then "Reconcile Home Medications" tabs to review and/or act upon these items.     The home medication list has been updated by the Pharmacy department.   Please read ALL comments highlighted in yellow.   Please address this information as you see fit.    Feel free to contact us if you have any questions or require assistance.        Medications listed below were obtained from: Patient/family and Analytic software- [employer]  Medications Ordered Prior to Encounter[1]    Potential issues to be addressed PRIOR TO DISCHARGE  Patient reported not taking the following medications: (Oxycodone & Flexeril). These medications remain on the home medication list. Please address accordingly.     María Vernon  EXT 1921           [1]   No current facility-administered medications on file prior to encounter.     Current Outpatient Medications on File Prior to Encounter   Medication Sig Dispense Refill    acetaminophen (TYLENOL) 325 MG tablet Take 2 tablets (650 mg total) by mouth every 8 (eight) hours as needed for Pain (pain scale 1-4).  0    amlodipine-benazepril 5-20 mg (LOTREL) 5-20 mg per capsule Take 1 capsule by mouth once daily. 90 capsule 3    cyanocobalamin 1,000 mcg/mL injection Inject 1 mL (1,000 mcg total) into the skin once daily. 30 mL 0    docusate sodium (COLACE) 100 MG capsule Take 1 capsule (100 mg total) by mouth 2 (two) times daily. 60 capsule 1    famotidine (PEPCID) 20 MG tablet Take 20 mg by mouth daily as needed for Heartburn.      levETIRAcetam (KEPPRA) 500 MG Tab Take 1 tablet (500 mg total) by mouth 2 (two) times daily. 180 tablet 1    multivitamin with minerals tablet Take 1 tablet by mouth once daily.      pregabalin (LYRICA) 200 MG Cap Take 1 capsule (200 mg total) by mouth 3 (three) times daily. 90 capsule 0    cyanocobalamin (VITAMIN B-12) 1000 MCG tablet " Take 0.5 tablets (500 mcg total) by mouth once daily. (Patient not taking: Reported on 4/23/2025) 15 tablet 0    cyclobenzaprine (FLEXERIL) 10 MG tablet Take 1 tablet (10 mg total) by mouth 3 (three) times daily as needed for Muscle spasms. (Patient not taking: Reported on 4/23/2025)      oxyCODONE-acetaminophen (PERCOCET)  mg per tablet Take 1 tablet by mouth every 4 (four) hours as needed for Pain. (Patient not taking: Reported on 4/23/2025) 12 tablet 0    [DISCONTINUED] pantoprazole (PROTONIX) 40 MG tablet Take 1 tablet (40 mg total) by mouth 2 (two) times daily. 60 tablet 4

## 2025-04-24 VITALS
SYSTOLIC BLOOD PRESSURE: 140 MMHG | WEIGHT: 135.81 LBS | OXYGEN SATURATION: 97 % | BODY MASS INDEX: 21.83 KG/M2 | DIASTOLIC BLOOD PRESSURE: 78 MMHG | RESPIRATION RATE: 16 BRPM | HEART RATE: 64 BPM | TEMPERATURE: 98 F | HEIGHT: 66 IN

## 2025-04-24 LAB
ABSOLUTE EOSINOPHIL (SMH): 0.2 K/UL
ABSOLUTE MONOCYTE (SMH): 0.47 K/UL (ref 0.3–1)
ABSOLUTE NEUTROPHIL COUNT (SMH): 3.8 K/UL (ref 1.8–7.7)
ALBUMIN SERPL-MCNC: 3.4 G/DL (ref 3.5–5.2)
ALP SERPL-CCNC: 73 UNIT/L (ref 55–135)
ALT SERPL-CCNC: 11 UNIT/L (ref 10–44)
ANION GAP (SMH): 9 MMOL/L (ref 8–16)
AST SERPL-CCNC: 12 UNIT/L (ref 10–40)
BASOPHILS # BLD AUTO: 0.03 K/UL
BASOPHILS NFR BLD AUTO: 0.6 %
BILIRUB SERPL-MCNC: 0.4 MG/DL (ref 0.1–1)
BUN SERPL-MCNC: 11 MG/DL (ref 8–23)
CALCIUM SERPL-MCNC: 8.7 MG/DL (ref 8.7–10.5)
CHLORIDE SERPL-SCNC: 110 MMOL/L (ref 95–110)
CO2 SERPL-SCNC: 20 MMOL/L (ref 23–29)
CREAT SERPL-MCNC: 0.6 MG/DL (ref 0.5–1.4)
ERYTHROCYTE [DISTWIDTH] IN BLOOD BY AUTOMATED COUNT: 17.5 % (ref 11.5–14.5)
GFR SERPLBLD CREATININE-BSD FMLA CKD-EPI: >60 ML/MIN/1.73/M2
GLUCOSE SERPL-MCNC: 108 MG/DL (ref 70–110)
HCT VFR BLD AUTO: 29.4 % (ref 37–48.5)
HGB BLD-MCNC: 9 GM/DL (ref 12–16)
IMM GRANULOCYTES # BLD AUTO: 0.02 K/UL (ref 0–0.04)
IMM GRANULOCYTES NFR BLD AUTO: 0.4 % (ref 0–0.5)
LYMPHOCYTES # BLD AUTO: 0.98 K/UL (ref 1–4.8)
MAGNESIUM SERPL-MCNC: 1.9 MG/DL (ref 1.6–2.6)
MCH RBC QN AUTO: 25.6 PG (ref 27–31)
MCHC RBC AUTO-ENTMCNC: 30.6 G/DL (ref 32–36)
MCV RBC AUTO: 84 FL (ref 82–98)
NUCLEATED RBC (/100WBC) (SMH): 0 /100 WBC
PLATELET # BLD AUTO: 466 K/UL (ref 150–450)
PMV BLD AUTO: 9.1 FL (ref 9.2–12.9)
POTASSIUM SERPL-SCNC: 3.7 MMOL/L (ref 3.5–5.1)
PROT SERPL-MCNC: 6 GM/DL (ref 6–8.4)
RBC # BLD AUTO: 3.51 M/UL (ref 4–5.4)
RELATIVE EOSINOPHIL (SMH): 3.7 % (ref 0–8)
RELATIVE LYMPHOCYTE (SMH): 18 % (ref 18–48)
RELATIVE MONOCYTE (SMH): 8.6 % (ref 4–15)
RELATIVE NEUTROPHIL (SMH): 68.7 % (ref 38–73)
SODIUM SERPL-SCNC: 139 MMOL/L (ref 136–145)
WBC # BLD AUTO: 5.45 K/UL (ref 3.9–12.7)

## 2025-04-24 PROCEDURE — 25000003 PHARM REV CODE 250: Performed by: NURSE PRACTITIONER

## 2025-04-24 PROCEDURE — 83735 ASSAY OF MAGNESIUM: CPT | Performed by: NURSE PRACTITIONER

## 2025-04-24 PROCEDURE — 36415 COLL VENOUS BLD VENIPUNCTURE: CPT

## 2025-04-24 PROCEDURE — 25000003 PHARM REV CODE 250

## 2025-04-24 PROCEDURE — 85025 COMPLETE CBC W/AUTO DIFF WBC: CPT

## 2025-04-24 PROCEDURE — 82040 ASSAY OF SERUM ALBUMIN: CPT

## 2025-04-24 PROCEDURE — 63600175 PHARM REV CODE 636 W HCPCS

## 2025-04-24 RX ORDER — LIDOCAINE 50 MG/G
1 PATCH TOPICAL
Status: DISCONTINUED | OUTPATIENT
Start: 2025-04-24 | End: 2025-04-24 | Stop reason: HOSPADM

## 2025-04-24 RX ORDER — PANTOPRAZOLE SODIUM 40 MG/1
40 TABLET, DELAYED RELEASE ORAL DAILY
Qty: 30 TABLET | Refills: 0 | Status: SHIPPED | OUTPATIENT
Start: 2025-04-24 | End: 2025-05-24

## 2025-04-24 RX ADMIN — AMLODIPINE BESYLATE 5 MG: 5 TABLET ORAL at 08:04

## 2025-04-24 RX ADMIN — POTASSIUM BICARBONATE 50 MEQ: 978 TABLET, EFFERVESCENT ORAL at 08:04

## 2025-04-24 RX ADMIN — SODIUM CHLORIDE: 9 INJECTION, SOLUTION INTRAVENOUS at 05:04

## 2025-04-24 RX ADMIN — LIDOCAINE 1 PATCH: 50 PATCH CUTANEOUS at 08:04

## 2025-04-24 RX ADMIN — PANTOPRAZOLE SODIUM 40 MG: 40 INJECTION, POWDER, FOR SOLUTION INTRAVENOUS at 08:04

## 2025-04-24 RX ADMIN — LEVETIRACETAM 500 MG: 500 TABLET, FILM COATED ORAL at 08:04

## 2025-04-24 NOTE — PT/OT/SLP PROGRESS
Occupational Therapy      Patient Name:  Froilan Ray   MRN:  [id number]    Patient not seen today secondary to Other (Comment) (d/c before seeing).    4/24/2025

## 2025-04-24 NOTE — PLAN OF CARE
Problem: Gastrointestinal Bleeding  Goal: Optimal Coping with Acute Illness  Outcome: Progressing  Goal: Hemostasis  Outcome: Progressing     Problem: Adult Inpatient Plan of Care  Goal: Plan of Care Review  Outcome: Progressing  Goal: Patient-Specific Goal (Individualized)  Outcome: Progressing  Goal: Absence of Hospital-Acquired Illness or Injury  Outcome: Progressing  Goal: Optimal Comfort and Wellbeing  Outcome: Progressing  Goal: Readiness for Transition of Care  Outcome: Progressing     Problem: Acute Kidney Injury/Impairment  Goal: Fluid and Electrolyte Balance  Outcome: Progressing  Goal: Improved Oral Intake  Outcome: Progressing  Goal: Effective Renal Function  Outcome: Progressing     Problem: Fall Injury Risk  Goal: Absence of Fall and Fall-Related Injury  Outcome: Progressing

## 2025-04-24 NOTE — PLAN OF CARE
Problem: Gastrointestinal Bleeding  Goal: Optimal Coping with Acute Illness  Outcome: Met  Goal: Hemostasis  Outcome: Met     Problem: Adult Inpatient Plan of Care  Goal: Plan of Care Review  Outcome: Met  Goal: Patient-Specific Goal (Individualized)  Outcome: Met  Goal: Absence of Hospital-Acquired Illness or Injury  Outcome: Met  Goal: Optimal Comfort and Wellbeing  Outcome: Met  Goal: Readiness for Transition of Care  Outcome: Met     Problem: Acute Kidney Injury/Impairment  Goal: Fluid and Electrolyte Balance  Outcome: Met  Goal: Improved Oral Intake  Outcome: Met  Goal: Effective Renal Function  Outcome: Met     Problem: Fall Injury Risk  Goal: Absence of Fall and Fall-Related Injury  Outcome: Met

## 2025-04-24 NOTE — PLAN OF CARE
Pt clear for DC from case management standpoint. Discharging to home with spouse.         04/24/25 1001   Final Note   Assessment Type Final Discharge Note   Anticipated Discharge Disposition Home   Hospital Resources/Appts/Education Provided Appointments scheduled and added to AVS

## 2025-04-25 LAB — BACTERIA UR CULT: ABNORMAL

## 2025-04-25 RX ORDER — CEFUROXIME AXETIL 250 MG/1
250 TABLET ORAL 2 TIMES DAILY
Qty: 20 TABLET | Refills: 0 | Status: SHIPPED | OUTPATIENT
Start: 2025-04-25 | End: 2025-05-05

## 2025-04-26 ENCOUNTER — TELEPHONE (OUTPATIENT)
Dept: FAMILY MEDICINE | Facility: CLINIC | Age: 80
End: 2025-04-26
Payer: MEDICARE

## 2025-04-26 NOTE — TELEPHONE ENCOUNTER
Contacted patient regarding recent imaging and urine culture result.     I did instruct patient to hold the Protonix and Pepcid medication.

## 2025-04-28 NOTE — HOSPITAL COURSE
"Froilan Ray was admitted to the service of hospital medicine for further evaluation of symptomatic anemia.  She was transfused one unit PRBCs and GI was consulted.  CTA abdomen and pelvis showed, "mild perigastric haziness, gastric wall and fold thickening may relate to incomplete distention however can be seen with gastritis or gastric ulcer disease or infiltrating process, clinical and historical correlation is needed, consideration to follow-up with upper GI examination or endoscopy is recommended."  GI planned to perform EGD and colonoscopy the morning of 4/24, but she refused and requested that these studies be done in the outpatient setting.  PT/OT recommended moderate intensity therapy.  She declined placement and also declined HH.  GI agreed to outpatient endoscopy and she was cleared for discharge home.  Seen and examined on date of discharge and was appropriate.  Discharge instructions were reviewed and return precautions discussed with good understanding.     "

## 2025-04-28 NOTE — DISCHARGE SUMMARY
"[facility]  Discharge Summary      Patient Name: Froilan Ray  MRN: [id number]  IVAN: [id number]  Patient Class: IP- Inpatient  Admission Date: 4/22/2025  Hospital Length of Stay: 2 days  Discharge Date and Time: 4/24/2025 10:53 AM  Attending Physician: Reba att. providers found   Discharging Provider: CHRISTAL Robison  Primary Care Provider: Alphonse Boothe III, MD    Primary Care Team: Networked reference to record PCT     HPI:   Froilan Ray is a 79 year old female with a past medical history of colitis with ulceration erosive gastritis, FAISAL, degenerative joint disease, diverticulitis, hypertension, irritable bowel syndrome, seizures, atrial fibrillation without any anticoagulation therapy (history of taking amiodarone and Eliquis), HFrEF 50% in 2022,  chronic left lower extremity swelling on p.r.n. Lasix, and neuropathy with surgical history of cholecystectomy, and strangulated abdominal wall hernia repair who presented with complaints of left lower quadrant and epigastric abdominal pain associated with generalized fatigue for 2 weeks. She reports her PCP instructed her to present to the ED due to a hemoglobin 7.4 on 04/13 hemoglobin 9.0.  She denies chest pain, shortness of breath, fever, chills, dizziness, lightheadedness, diarrhea, hematemesis, melena, hematochezia, or vomiting. ED workup reveals: CTA abdomen pending.  CBC with white count 6.3 and H&H 7.8/26.4. Stool for occult blood negative. She is hemodynamically stable on room air. She received 1 unit packed red blood cells in ED. Admitted to hospital medicine for further management and treatment.          * No surgery found *      Hospital Course:   Froilan Ray was admitted to the service of hospital medicine for further evaluation of symptomatic anemia.  She was transfused one unit PRBCs and GI was consulted.  CTA abdomen and pelvis showed, "mild perigastric haziness, gastric wall and fold " "thickening may relate to incomplete distention however can be seen with gastritis or gastric ulcer disease or infiltrating process, clinical and historical correlation is needed, consideration to follow-up with upper GI examination or endoscopy is recommended."  GI planned to perform EGD and colonoscopy the morning of 4/24, but she refused and requested that these studies be done in the outpatient setting.  PT/OT recommended moderate intensity therapy.  She declined placement and also declined HH.  GI agreed to outpatient endoscopy and she was cleared for discharge home.  Seen and examined on date of discharge and was appropriate.  Discharge instructions were reviewed and return precautions discussed with good understanding.        Goals of Care Treatment Preferences:  Code Status: Full Code          What is most important right now is to focus on symptom/pain control.  Accordingly, we have decided that the best plan to meet the patient's goals includes continuing with treatment.      SDOH Screening:  The patient declined to be screened for utility difficulties, food insecurity, transport difficulties, housing insecurity, and interpersonal safety, so no concerns could be identified this admission.     Consults:   Consults (From admission, onward)          Status Ordering Provider     Inpatient consult to Gastroenterology  Once        Provider:  Saadia Chance MD    Completed WALE ZAFAR            Assessment & Plan  Symptomatic anemia  Anemia is likely due to Iron deficiency. Most recent hemoglobin and hematocrit are listed below.  No results for input(s): "HGB", "HCT" in the last 72 hours.    Plan  - Monitor serial CBC: Daily  - Transfuse PRBC if patient becomes hemodynamically unstable, symptomatic or H/H drops below 7/21.  - Patient has received 1 units of PRBCs on 04/23  - Patient's anemia is currently stable    Essential hypertension  Patient's blood pressure range in the last 24 hours was: No " data recorded.The patient's inpatient anti-hypertensive regimen is listed below:  Current Antihypertensives       Plan  - BP is controlled, no changes needed to their regimen    Paroxysmal atrial fibrillation  Patient has paroxysmal (<7 days) atrial fibrillation. Patient is currently in sinus rhythm. [id number] Score: 3. The patients heart rate in the last 24 hours is as follows:  No data recorded     Antiarrhythmics       Anticoagulants       Plan  - Replete lytes with a goal of K>4, Mg >2  - Patient is not anticoagulated due to unknown; history bleeding ulcerations  - Patient's afib is currently  not in AFib    Chronic heart failure with preserved ejection fraction  Patient is identified as having Systolic (HFrEF) heart failure that is Chronic. CHF is currently controlled. Latest ECHO performed and demonstrates- Results for orders placed during the hospital encounter of 01/17/24    Echo    Interpretation Summary    Left Ventricle: The left ventricle is normal in size. Normal wall thickness. There is concentric remodeling. Normal wall motion. There is low normal systolic function with a visually estimated ejection fraction of 50 - 55%. There is normal diastolic function.    Right Ventricle: Normal right ventricular cavity size. Wall thickness is normal. Right ventricle wall motion  is normal. Systolic function is normal.    Aortic Valve: There is moderate aortic valve sclerosis. There is moderate annular calcification present.    Mitral Valve: There is severe mitral annular calcification present.    Aorta: Aortic root is normal in size measuring 3.37 cm. Ascending aorta is normal measuring 3.51 cm.    Pulmonary Artery: The estimated pulmonary artery systolic pressure is 25 mmHg.    IVC/SVC: Normal venous pressure at 3 mmHg.  . Continue Beta Blocker and monitor clinical status closely. Monitor on telemetry. Patient is off CHF pathway.  Monitor strict Is&Os and daily weights.  Place on fluid restriction of 1.5 L.  "Cardiology is not consulted. Continue to stress to patient importance of self efficacy and  on diet for CHF. Last BNP reviewed- and noted below No results for input(s): "BNP", "BNPTRIAGEBLO" in the last 168 hours..          Diverticulitis  -chronic condition noted  -CT did not show acute diverticulitis    Final Active Diagnoses:    Diagnosis Date Noted POA    PRINCIPAL PROBLEM:  Symptomatic anemia [D64.9] 11/27/2024 Yes    Diverticulitis [K57.92] 04/23/2025 Yes     Chronic    Chronic heart failure with preserved ejection fraction [I50.32] 01/16/2024 Yes    Paroxysmal atrial fibrillation [I48.0] 06/16/2022 Yes    Essential hypertension [I10] 12/11/2018 Yes     Chronic      Problems Resolved During this Admission:       Discharged Condition: stable    Disposition: Home or Self Care    Follow Up:   Follow-up Information       Saadia Chance MD Follow up.    Specialty: Gastroenterology  Contact information:  [address]  [place] [address]  [phone]               Sharp, Alphonse H. III, MD. Go on 5/5/2025.    Specialty: Family Medicine  Why: TY Bradley at 1pm  Contact information:  [address] [facility]  [address]  [place] [address]  [phone]                           Patient Instructions:      Diet Adult Regular     Notify your health care provider if you experience any of the following:  temperature >100.4     Notify your health care provider if you experience any of the following:  persistent nausea and vomiting or diarrhea     Notify your health care provider if you experience any of the following:  severe uncontrolled pain     Notify your health care provider if you experience any of the following:  difficulty breathing or increased cough     Notify your health care provider if you experience any of the following:  severe persistent headache     Notify your health care provider if you experience any of the following:  persistent dizziness, light-headedness, or visual disturbances     Notify " your health care provider if you experience any of the following:  increased confusion or weakness     Activity as tolerated       Significant Diagnostic Studies: Labs: All labs within the past 24 hours have been reviewed    Pending Diagnostic Studies:       Procedure Component Value Units Date/Time    EXTRA TUBES [[id number]] Collected: 04/22/25 2155    Order Status: Sent Lab Status: In process Updated: 04/22/25 2215    Specimen: Blood, Venous     Narrative:      The following orders were created for panel order EXTRA TUBES.  Procedure                               Abnormality         Status                     ---------                               -----------         ------                     Light Blue Top Hold[[id number]]                             In process                 Lavender Top Hold[[id number]]                               In process                 Gold Top Hold[[id number]]                                   In process                   Please view results for these tests on the individual orders.           Medications:  Reconciled Home Medications:      Medication List        START taking these medications      pantoprazole 40 MG tablet  Commonly known as: PROTONIX  Take 1 tablet (40 mg total) by mouth once daily.            CONTINUE taking these medications      acetaminophen 325 MG tablet  Commonly known as: TYLENOL  Take 2 tablets (650 mg total) by mouth every 8 (eight) hours as needed for Pain (pain scale 1-4).     amlodipine-benazepril 5-20 mg 5-20 mg per capsule  Commonly known as: LOTREL  Take 1 capsule by mouth once daily.     * cyanocobalamin 1,000 mcg/mL injection  Inject 1 mL (1,000 mcg total) into the skin once daily.     docusate sodium 100 MG capsule  Commonly known as: COLACE  Take 1 capsule (100 mg total) by mouth 2 (two) times daily.     famotidine 20 MG tablet  Commonly known as: PEPCID  Take 20 mg by mouth daily as needed for Heartburn.     levETIRAcetam 500 MG Tab  Commonly  known as: KEPPRA  Take 1 tablet (500 mg total) by mouth 2 (two) times daily.     multivitamin with minerals tablet  Take 1 tablet by mouth once daily.     pregabalin 200 MG Cap  Commonly known as: LYRICA  Take 1 capsule (200 mg total) by mouth 3 (three) times daily.           * This list has 1 medication(s) that are the same as other medications prescribed for you. Read the directions carefully, and ask your doctor or other care provider to review them with you.                ASK your doctor about these medications      * cyanocobalamin 1000 MCG tablet  Commonly known as: VITAMIN B-12  Take 0.5 tablets (500 mcg total) by mouth once daily.     cyclobenzaprine 10 MG tablet  Commonly known as: FLEXERIL  Take 1 tablet (10 mg total) by mouth 3 (three) times daily as needed for Muscle spasms.     oxyCODONE-acetaminophen  mg per tablet  Commonly known as: PERCOCET  Take 1 tablet by mouth every 4 (four) hours as needed for Pain.           * This list has 1 medication(s) that are the same as other medications prescribed for you. Read the directions carefully, and ask your doctor or other care provider to review them with you.                  Indwelling Lines/Drains at time of discharge:   Lines/Drains/Airways       None                   Time spent on the discharge of patient: 33 minutes         CHRISTAL Robison  Department of Hospital Medicine  [facility]

## 2025-04-28 NOTE — ASSESSMENT & PLAN NOTE
Patient has paroxysmal (<7 days) atrial fibrillation. Patient is currently in sinus rhythm. [id number] Score: 3. The patients heart rate in the last 24 hours is as follows:  No data recorded     Antiarrhythmics       Anticoagulants       Plan  - Replete lytes with a goal of K>4, Mg >2  - Patient is not anticoagulated due to unknown; history bleeding ulcerations  - Patient's afib is currently not in AFib

## 2025-04-28 NOTE — ASSESSMENT & PLAN NOTE
"Anemia is likely due to Iron deficiency. Most recent hemoglobin and hematocrit are listed below.  No results for input(s): "HGB", "HCT" in the last 72 hours.    Plan  - Monitor serial CBC: Daily  - Transfuse PRBC if patient becomes hemodynamically unstable, symptomatic or H/H drops below 7/21.  - Patient has received 1 units of PRBCs on 04/23  - Patient's anemia is currently stable    "

## 2025-04-30 LAB
OHS QRS DURATION: 84 MS
OHS QTC CALCULATION: 449 MS

## 2025-05-29 NOTE — ASSESSMENT & PLAN NOTE
"78F with h/o HTN, CHF admitted 1/17 as transfer from [facility] for concern for septic L hip joint. Taken to OR today 1/19 for: Left hip proximal femoral resection with placement of antibiotic cement spacer, Removal of cephalomedullary nail left femur, Incision and drainage with irrigation deep abscess left buttock and thigh, Excisional debridement of fascia and muscle left hip. Bcx 1/16 with MRSA; repeat drawn 1/18 NGTD. 2d echo- adequate study, no veg. Currently on vanc/zosy. HSV pcr sent of lip blister- pending. ID consulted for "painful tongue lesions, HSV?, painful oral blisters after starting bactrim, SJS?, antibiotic recs for septic joint currently on Vanc     Likely buttock/gluteal abscess, necessitating to femoral head.    Recommendations:   - continue vancomycin, pharmacy dosing   - f/u repeat bcx to document clearance  - f/u HSV pcr  - anticipating 6 weeks IV abx with possible po abx suppression afterwards  - checked susceptibility of daptomycin: susceptible  " Patient communication:    Notified patient to stop at [facility] - 1st floor check in [address] [facility]. 30 minutes prior to your appointment time on 6/5/2025 with Ani Contreras PA-C for x-rays.     Cathy Gamboa MA  [facility]  P: [phone]  F: [phone]

## 2025-06-02 DIAGNOSIS — M96.1 POSTLAMINECTOMY SYNDROME, LUMBAR REGION: Primary | ICD-10-CM

## 2025-06-17 ENCOUNTER — HOSPITAL ENCOUNTER (OUTPATIENT)
Dept: RADIOLOGY | Facility: HOSPITAL | Age: 80
Discharge: HOME | End: 2025-06-17
Attending: PHYSICIAN ASSISTANT
Payer: MEDICARE

## 2025-06-17 DIAGNOSIS — M96.1 POSTLAMINECTOMY SYNDROME, LUMBAR REGION: ICD-10-CM

## 2025-06-17 LAB
CREAT SERPL-MCNC: 1.2 MG/DL (ref 0.5–1.4)
SAMPLE: NORMAL

## 2025-06-17 PROCEDURE — 82565 ASSAY OF CREATININE: CPT | Mod: PO

## 2025-06-17 PROCEDURE — 25500020 PHARM REV CODE 255: Mod: PO | Performed by: PHYSICIAN ASSISTANT

## 2025-06-17 PROCEDURE — A9585 GADOBUTROL INJECTION: HCPCS | Mod: PO | Performed by: PHYSICIAN ASSISTANT

## 2025-06-17 PROCEDURE — 72158 MRI LUMBAR SPINE W/O & W/DYE: CPT | Mod: 26,,, | Performed by: RADIOLOGY

## 2025-06-17 RX ORDER — GADOBUTROL 604.72 MG/ML
6.5 INJECTION INTRAVENOUS
Status: COMPLETED | OUTPATIENT
Start: 2025-06-17 | End: 2025-06-17

## 2025-06-17 RX ADMIN — GADOBUTROL 6.5 ML: 604.72 INJECTION INTRAVENOUS at 04:06

## 2025-07-02 DIAGNOSIS — Z78.0 MENOPAUSE: ICD-10-CM

## 2025-07-04 ENCOUNTER — HOSPITAL ENCOUNTER (INPATIENT)
Facility: HOSPITAL | Age: 80
LOS: 3 days | Discharge: HOME | DRG: 897 | End: 2025-07-08
Attending: EMERGENCY MEDICINE | Admitting: HOSPITALIST
Payer: MEDICARE

## 2025-07-04 DIAGNOSIS — F19.939: ICD-10-CM

## 2025-07-04 DIAGNOSIS — Z87.898 HISTORY OF SEIZURES: ICD-10-CM

## 2025-07-04 DIAGNOSIS — R56.9 SEIZURE: Primary | ICD-10-CM

## 2025-07-04 DIAGNOSIS — R07.9 CHEST PAIN: ICD-10-CM

## 2025-07-04 LAB
ABSOLUTE EOSINOPHIL (SMH): 0 K/UL
ABSOLUTE MONOCYTE (SMH): 0.28 K/UL (ref 0.3–1)
ABSOLUTE NEUTROPHIL COUNT (SMH): 5.7 K/UL (ref 1.8–7.7)
ALBUMIN SERPL-MCNC: 3.8 G/DL (ref 3.5–5.2)
ALP SERPL-CCNC: 100 UNIT/L (ref 55–135)
ALT SERPL-CCNC: 9 UNIT/L (ref 10–44)
ANION GAP (SMH): 13 MMOL/L (ref 8–16)
AST SERPL-CCNC: 14 UNIT/L (ref 10–40)
BASOPHILS # BLD AUTO: 0.02 K/UL
BASOPHILS NFR BLD AUTO: 0.3 %
BILIRUB SERPL-MCNC: 0.3 MG/DL (ref 0.1–1)
BUN SERPL-MCNC: 9 MG/DL (ref 8–23)
CALCIUM SERPL-MCNC: 9.3 MG/DL (ref 8.7–10.5)
CHLORIDE SERPL-SCNC: 104 MMOL/L (ref 95–110)
CO2 SERPL-SCNC: 20 MMOL/L (ref 23–29)
CREAT SERPL-MCNC: 0.6 MG/DL (ref 0.5–1.4)
ERYTHROCYTE [DISTWIDTH] IN BLOOD BY AUTOMATED COUNT: 15.5 % (ref 11.5–14.5)
GFR SERPLBLD CREATININE-BSD FMLA CKD-EPI: >60 ML/MIN/1.73/M2
GLUCOSE SERPL-MCNC: 104 MG/DL (ref 70–110)
HCT VFR BLD AUTO: 32.3 % (ref 37–48.5)
HGB BLD-MCNC: 9.8 GM/DL (ref 12–16)
IMM GRANULOCYTES # BLD AUTO: 0.04 K/UL (ref 0–0.04)
IMM GRANULOCYTES NFR BLD AUTO: 0.6 % (ref 0–0.5)
LYMPHOCYTES # BLD AUTO: 0.54 K/UL (ref 1–4.8)
MCH RBC QN AUTO: 24.1 PG (ref 27–31)
MCHC RBC AUTO-ENTMCNC: 30.3 G/DL (ref 32–36)
MCV RBC AUTO: 80 FL (ref 82–98)
NUCLEATED RBC (/100WBC) (SMH): 0 /100 WBC
PLATELET # BLD AUTO: 453 K/UL (ref 150–450)
PMV BLD AUTO: 9.3 FL (ref 9.2–12.9)
POTASSIUM SERPL-SCNC: 3.2 MMOL/L (ref 3.5–5.1)
PROT SERPL-MCNC: 6.8 GM/DL (ref 6–8.4)
RBC # BLD AUTO: 4.06 M/UL (ref 4–5.4)
RELATIVE EOSINOPHIL (SMH): 0 % (ref 0–8)
RELATIVE LYMPHOCYTE (SMH): 8.2 % (ref 18–48)
RELATIVE MONOCYTE (SMH): 4.2 % (ref 4–15)
RELATIVE NEUTROPHIL (SMH): 86.7 % (ref 38–73)
SODIUM SERPL-SCNC: 137 MMOL/L (ref 136–145)
TROPONIN HIGH SENSITIVE (SMH): 30.1 PG/ML
WBC # BLD AUTO: 6.62 K/UL (ref 3.9–12.7)

## 2025-07-04 PROCEDURE — 84484 ASSAY OF TROPONIN QUANT: CPT | Performed by: EMERGENCY MEDICINE

## 2025-07-04 PROCEDURE — 25000003 PHARM REV CODE 250: Performed by: EMERGENCY MEDICINE

## 2025-07-04 PROCEDURE — 83735 ASSAY OF MAGNESIUM: CPT

## 2025-07-04 PROCEDURE — 80177 DRUG SCRN QUAN LEVETIRACETAM: CPT | Performed by: EMERGENCY MEDICINE

## 2025-07-04 PROCEDURE — 96374 THER/PROPH/DIAG INJ IV PUSH: CPT

## 2025-07-04 PROCEDURE — 93005 ELECTROCARDIOGRAM TRACING: CPT | Performed by: GENERAL PRACTICE

## 2025-07-04 PROCEDURE — 93010 ELECTROCARDIOGRAM REPORT: CPT | Mod: 76,,, | Performed by: GENERAL PRACTICE

## 2025-07-04 PROCEDURE — 99285 EMERGENCY DEPT VISIT HI MDM: CPT | Mod: 25

## 2025-07-04 PROCEDURE — 80053 COMPREHEN METABOLIC PANEL: CPT | Performed by: EMERGENCY MEDICINE

## 2025-07-04 PROCEDURE — 63600175 PHARM REV CODE 636 W HCPCS: Performed by: EMERGENCY MEDICINE

## 2025-07-04 PROCEDURE — 85025 COMPLETE CBC W/AUTO DIFF WBC: CPT | Performed by: EMERGENCY MEDICINE

## 2025-07-04 RX ORDER — OXYCODONE AND ACETAMINOPHEN 5; 325 MG/1; MG/1
2 TABLET ORAL
Refills: 0 | Status: COMPLETED | OUTPATIENT
Start: 2025-07-04 | End: 2025-07-04

## 2025-07-04 RX ADMIN — LEVETIRACETAM 2400 MG: 100 INJECTION INTRAVENOUS at 11:07

## 2025-07-04 RX ADMIN — POTASSIUM BICARBONATE 50 MEQ: 977.5 TABLET, EFFERVESCENT ORAL at 11:07

## 2025-07-04 RX ADMIN — OXYCODONE HYDROCHLORIDE AND ACETAMINOPHEN 2 TABLET: 5; 325 TABLET ORAL at 09:07

## 2025-07-05 PROBLEM — F19.939: Status: ACTIVE | Noted: 2025-07-05

## 2025-07-05 PROBLEM — F11.23 OPIOID DEPENDENCE WITH WITHDRAWAL: Status: ACTIVE | Noted: 2025-07-05

## 2025-07-05 LAB
ABSOLUTE EOSINOPHIL (SMH): 0 K/UL
ABSOLUTE MONOCYTE (SMH): 0.57 K/UL (ref 0.3–1)
ABSOLUTE NEUTROPHIL COUNT (SMH): 4.8 K/UL (ref 1.8–7.7)
ALBUMIN SERPL-MCNC: 3.5 G/DL (ref 3.5–5.2)
ALP SERPL-CCNC: 91 UNIT/L (ref 55–135)
ALT SERPL-CCNC: 6 UNIT/L (ref 10–44)
AMPHET UR QL SCN: NEGATIVE
ANION GAP (SMH): 10 MMOL/L (ref 8–16)
AST SERPL-CCNC: 11 UNIT/L (ref 10–40)
BARBITURATE SCN PRESENT UR: NEGATIVE
BASOPHILS # BLD AUTO: 0.04 K/UL
BASOPHILS NFR BLD AUTO: 0.6 %
BENZODIAZ UR QL SCN: NEGATIVE
BILIRUB SERPL-MCNC: 0.3 MG/DL (ref 0.1–1)
BUN SERPL-MCNC: 8 MG/DL (ref 8–23)
CALCIUM SERPL-MCNC: 8.9 MG/DL (ref 8.7–10.5)
CANNABINOIDS UR QL SCN: ABNORMAL
CHLORIDE SERPL-SCNC: 106 MMOL/L (ref 95–110)
CO2 SERPL-SCNC: 22 MMOL/L (ref 23–29)
COCAINE UR QL SCN: NEGATIVE
CREAT SERPL-MCNC: 0.6 MG/DL (ref 0.5–1.4)
CREAT UR-MCNC: 117.4 MG/DL (ref 15–325)
ERYTHROCYTE [DISTWIDTH] IN BLOOD BY AUTOMATED COUNT: 15.3 % (ref 11.5–14.5)
GFR SERPLBLD CREATININE-BSD FMLA CKD-EPI: >60 ML/MIN/1.73/M2
GLUCOSE SERPL-MCNC: 104 MG/DL (ref 70–110)
HCT VFR BLD AUTO: 32.4 % (ref 37–48.5)
HGB BLD-MCNC: 9.8 GM/DL (ref 12–16)
IMM GRANULOCYTES # BLD AUTO: 0.02 K/UL (ref 0–0.04)
IMM GRANULOCYTES NFR BLD AUTO: 0.3 % (ref 0–0.5)
LYMPHOCYTES # BLD AUTO: 1.6 K/UL (ref 1–4.8)
MAGNESIUM SERPL-MCNC: 1.9 MG/DL (ref 1.6–2.6)
MCH RBC QN AUTO: 24.3 PG (ref 27–31)
MCHC RBC AUTO-ENTMCNC: 30.2 G/DL (ref 32–36)
MCV RBC AUTO: 80 FL (ref 82–98)
NUCLEATED RBC (/100WBC) (SMH): 0 /100 WBC
OPIATES UR QL SCN: ABNORMAL
PCP UR QL: NEGATIVE
PLATELET # BLD AUTO: 416 K/UL (ref 150–450)
PMV BLD AUTO: 9.1 FL (ref 9.2–12.9)
POTASSIUM SERPL-SCNC: 3 MMOL/L (ref 3.5–5.1)
PROT SERPL-MCNC: 6.2 GM/DL (ref 6–8.4)
RBC # BLD AUTO: 4.04 M/UL (ref 4–5.4)
RELATIVE EOSINOPHIL (SMH): 0 % (ref 0–8)
RELATIVE LYMPHOCYTE (SMH): 22.7 % (ref 18–48)
RELATIVE MONOCYTE (SMH): 8.1 % (ref 4–15)
RELATIVE NEUTROPHIL (SMH): 68.3 % (ref 38–73)
SODIUM SERPL-SCNC: 138 MMOL/L (ref 136–145)
TROPONIN HIGH SENSITIVE (SMH): 52.9 PG/ML
WBC # BLD AUTO: 7.04 K/UL (ref 3.9–12.7)

## 2025-07-05 PROCEDURE — 25000003 PHARM REV CODE 250

## 2025-07-05 PROCEDURE — 25000003 PHARM REV CODE 250: Performed by: HOSPITALIST

## 2025-07-05 PROCEDURE — 63600175 PHARM REV CODE 636 W HCPCS

## 2025-07-05 PROCEDURE — 11000001 HC ACUTE MED/SURG PRIVATE ROOM

## 2025-07-05 PROCEDURE — 36415 COLL VENOUS BLD VENIPUNCTURE: CPT

## 2025-07-05 PROCEDURE — 80307 DRUG TEST PRSMV CHEM ANLYZR: CPT

## 2025-07-05 PROCEDURE — 80053 COMPREHEN METABOLIC PANEL: CPT

## 2025-07-05 PROCEDURE — 85025 COMPLETE CBC W/AUTO DIFF WBC: CPT

## 2025-07-05 PROCEDURE — G0426 INPT/ED TELECONSULT50: HCPCS | Mod: 95,,, | Performed by: STUDENT IN AN ORGANIZED HEALTH CARE EDUCATION/TRAINING PROGRAM

## 2025-07-05 RX ORDER — TALC
6 POWDER (GRAM) TOPICAL NIGHTLY PRN
Status: DISCONTINUED | OUTPATIENT
Start: 2025-07-05 | End: 2025-07-08 | Stop reason: HOSPADM

## 2025-07-05 RX ORDER — LEVETIRACETAM 500 MG/1
500 TABLET ORAL 2 TIMES DAILY
Status: DISCONTINUED | OUTPATIENT
Start: 2025-07-05 | End: 2025-07-08 | Stop reason: HOSPADM

## 2025-07-05 RX ORDER — GLUCAGON 1 MG
1 KIT INJECTION
Status: DISCONTINUED | OUTPATIENT
Start: 2025-07-05 | End: 2025-07-08 | Stop reason: HOSPADM

## 2025-07-05 RX ORDER — SODIUM,POTASSIUM PHOSPHATES 280-250MG
2 POWDER IN PACKET (EA) ORAL
Status: DISCONTINUED | OUTPATIENT
Start: 2025-07-05 | End: 2025-07-08 | Stop reason: HOSPADM

## 2025-07-05 RX ORDER — MAGNESIUM SULFATE 1 G/100ML
1 INJECTION INTRAVENOUS ONCE
Status: COMPLETED | OUTPATIENT
Start: 2025-07-05 | End: 2025-07-05

## 2025-07-05 RX ORDER — LANOLIN ALCOHOL/MO/W.PET/CERES
800 CREAM (GRAM) TOPICAL
Status: DISCONTINUED | OUTPATIENT
Start: 2025-07-05 | End: 2025-07-08 | Stop reason: HOSPADM

## 2025-07-05 RX ORDER — POTASSIUM CHLORIDE 7.45 MG/ML
20 INJECTION INTRAVENOUS ONCE
Status: COMPLETED | OUTPATIENT
Start: 2025-07-05 | End: 2025-07-05

## 2025-07-05 RX ORDER — ALUMINUM HYDROXIDE, MAGNESIUM HYDROXIDE, AND SIMETHICONE 1200; 120; 1200 MG/30ML; MG/30ML; MG/30ML
30 SUSPENSION ORAL 4 TIMES DAILY PRN
Status: DISCONTINUED | OUTPATIENT
Start: 2025-07-05 | End: 2025-07-08 | Stop reason: HOSPADM

## 2025-07-05 RX ORDER — IBUPROFEN 200 MG
16 TABLET ORAL
Status: DISCONTINUED | OUTPATIENT
Start: 2025-07-05 | End: 2025-07-08 | Stop reason: HOSPADM

## 2025-07-05 RX ORDER — LISINOPRIL 20 MG/1
20 TABLET ORAL DAILY
Status: DISCONTINUED | OUTPATIENT
Start: 2025-07-05 | End: 2025-07-08 | Stop reason: HOSPADM

## 2025-07-05 RX ORDER — SODIUM CHLORIDE 0.9 % (FLUSH) 0.9 %
10 SYRINGE (ML) INJECTION EVERY 12 HOURS PRN
Status: DISCONTINUED | OUTPATIENT
Start: 2025-07-05 | End: 2025-07-08 | Stop reason: HOSPADM

## 2025-07-05 RX ORDER — LOPERAMIDE HYDROCHLORIDE 2 MG/1
4 CAPSULE ORAL ONCE
Status: COMPLETED | OUTPATIENT
Start: 2025-07-05 | End: 2025-07-05

## 2025-07-05 RX ORDER — ONDANSETRON HYDROCHLORIDE 2 MG/ML
4 INJECTION, SOLUTION INTRAVENOUS EVERY 6 HOURS PRN
Status: DISCONTINUED | OUTPATIENT
Start: 2025-07-05 | End: 2025-07-08 | Stop reason: HOSPADM

## 2025-07-05 RX ORDER — ACETAMINOPHEN 325 MG/1
650 TABLET ORAL EVERY 8 HOURS PRN
Status: DISCONTINUED | OUTPATIENT
Start: 2025-07-05 | End: 2025-07-05

## 2025-07-05 RX ORDER — FAMOTIDINE 20 MG/1
20 TABLET, FILM COATED ORAL DAILY PRN
Status: DISCONTINUED | OUTPATIENT
Start: 2025-07-05 | End: 2025-07-05

## 2025-07-05 RX ORDER — L. ACIDOPHILUS/L.BULGARICUS 100MM CELL
1 GRANULES IN PACKET (EA) ORAL ONCE
Status: COMPLETED | OUTPATIENT
Start: 2025-07-05 | End: 2025-07-05

## 2025-07-05 RX ORDER — AMLODIPINE BESYLATE 5 MG/1
5 TABLET ORAL DAILY
Status: DISCONTINUED | OUTPATIENT
Start: 2025-07-05 | End: 2025-07-07

## 2025-07-05 RX ORDER — NALOXONE HCL 0.4 MG/ML
0.02 VIAL (ML) INJECTION
Status: DISCONTINUED | OUTPATIENT
Start: 2025-07-05 | End: 2025-07-08 | Stop reason: HOSPADM

## 2025-07-05 RX ORDER — OXYCODONE AND ACETAMINOPHEN 10; 325 MG/1; MG/1
1 TABLET ORAL EVERY 6 HOURS PRN
Refills: 0 | Status: DISCONTINUED | OUTPATIENT
Start: 2025-07-05 | End: 2025-07-08 | Stop reason: HOSPADM

## 2025-07-05 RX ORDER — DOCUSATE SODIUM 100 MG/1
100 CAPSULE, LIQUID FILLED ORAL 2 TIMES DAILY
Status: DISCONTINUED | OUTPATIENT
Start: 2025-07-05 | End: 2025-07-08 | Stop reason: HOSPADM

## 2025-07-05 RX ORDER — ENOXAPARIN SODIUM 100 MG/ML
40 INJECTION SUBCUTANEOUS EVERY 24 HOURS
Status: DISCONTINUED | OUTPATIENT
Start: 2025-07-05 | End: 2025-07-08 | Stop reason: HOSPADM

## 2025-07-05 RX ORDER — FAMOTIDINE 20 MG/1
20 TABLET, FILM COATED ORAL 2 TIMES DAILY PRN
Status: DISCONTINUED | OUTPATIENT
Start: 2025-07-05 | End: 2025-07-08 | Stop reason: HOSPADM

## 2025-07-05 RX ORDER — IBUPROFEN 200 MG
24 TABLET ORAL
Status: DISCONTINUED | OUTPATIENT
Start: 2025-07-05 | End: 2025-07-08 | Stop reason: HOSPADM

## 2025-07-05 RX ORDER — ACETAMINOPHEN 325 MG/1
650 TABLET ORAL EVERY 4 HOURS PRN
Status: DISCONTINUED | OUTPATIENT
Start: 2025-07-05 | End: 2025-07-08 | Stop reason: HOSPADM

## 2025-07-05 RX ADMIN — LEVETIRACETAM 500 MG: 500 TABLET, FILM COATED ORAL at 08:07

## 2025-07-05 RX ADMIN — DOCUSATE SODIUM 100 MG: 100 CAPSULE, LIQUID FILLED ORAL at 08:07

## 2025-07-05 RX ADMIN — AMLODIPINE BESYLATE 5 MG: 5 TABLET ORAL at 08:07

## 2025-07-05 RX ADMIN — PREGABALIN 200 MG: 75 CAPSULE ORAL at 08:07

## 2025-07-05 RX ADMIN — LOPERAMIDE HYDROCHLORIDE 4 MG: 2 CAPSULE ORAL at 10:07

## 2025-07-05 RX ADMIN — OXYCODONE HYDROCHLORIDE AND ACETAMINOPHEN 1 TABLET: 10; 325 TABLET ORAL at 10:07

## 2025-07-05 RX ADMIN — ENOXAPARIN SODIUM 40 MG: 40 INJECTION SUBCUTANEOUS at 04:07

## 2025-07-05 RX ADMIN — OXYCODONE HYDROCHLORIDE AND ACETAMINOPHEN 1 TABLET: 10; 325 TABLET ORAL at 12:07

## 2025-07-05 RX ADMIN — PREGABALIN 200 MG: 75 CAPSULE ORAL at 02:07

## 2025-07-05 RX ADMIN — LISINOPRIL 20 MG: 20 TABLET ORAL at 08:07

## 2025-07-05 RX ADMIN — LACTOBACILLUS ACIDOPHILUS / LACTOBACILLUS BULGARICUS 1 EACH: 100 MILLION CFU STRENGTH GRANULES at 10:07

## 2025-07-05 RX ADMIN — POTASSIUM CHLORIDE 20 MEQ: 7.46 INJECTION, SOLUTION INTRAVENOUS at 02:07

## 2025-07-05 RX ADMIN — MAGNESIUM SULFATE IN DEXTROSE 1 G: 10 INJECTION, SOLUTION INTRAVENOUS at 01:07

## 2025-07-05 NOTE — CARE UPDATE
79-year-old female admitted s/p seizure like activity.  Discussed with Neurology, EEG ordered.  H&P reviewed, agree with current plan of care.  Patient seen and examined.  No acute distress.  Remains drowsy.  Says that she is in the hospital and does not feel good, knows the year is 2025.  Continue to monitor closely.

## 2025-07-05 NOTE — TELEMEDICINE CONSULT
"[facility]   General Neurology  Consult Note      Consult Information  Inpatient Consult to Neurology Services (General Neurology)  Consult performed by: Gareth Shannon MD  Consult ordered by: Julio Cesar Wiggins AGACNP-BC  Reason for consult: seizures      Consulting Provider: DEVAN TOURE   Current Providers  No providers found    Patient Location:  [address]* IP Unit    Spoke hospital nurse at bedside with patient assisting consultant.  Patient information was obtained from patient, ER records, and primary team.     Neurology Documentation     Blood pressure (!) 164/80, pulse 79, temperature 97.6 °F (36.4 °C), temperature source Oral, resp. rate 16, height 5' 6" (1.676 m), weight 63.6 kg (140 lb 3.4 oz), SpO2 96%.    Medical Decision Making  HPI:  79 y.o. female. Neurology consulted for seizures. She has a history of anemia, PUD, htn, diverticulitis, seizure d/o, hernia repair presenting with a one-week history of left upper and left lower quadrant abdominal pain, constant, moderate to severe associated with nausea and postprandial increase in pain accompanied by frequent, loose, dark stools. She has been off seizures medication for years without having any events up until now. She had 2 back to back. Has not had anymore since admission but is drowsy. CT head showed no abnormalities.      Images personally reviewed and interpreted:  Study: Head CT  Study Interpretation: as above    Additional studies reviewed:   Study: Cardiac_Neuro: none  Study Interpretation: N/A    Laboratory studies reviewed:  BMP:   Lab Results   Component Value Date     07/05/2025    K 3.0 (L) 07/05/2025     07/05/2025    CO2 22 (L) 07/05/2025    BUN 8 07/05/2025    CREATININE 0.6 07/05/2025    CALCIUM 8.9 07/05/2025     CBC:   Lab Results   Component Value Date    WBC 7.04 07/05/2025    RBC 4.04 07/05/2025    HGB 9.8 (L) 07/05/2025    HCT 32.4 (L) 07/05/2025    HCT 33 (L) 02/15/2024     " 07/05/2025    MCV 80 (L) 07/05/2025    MCH 24.3 (L) 07/05/2025    MCHC 30.2 (L) 07/05/2025     Lipid Panel:   Lab Results   Component Value Date    CHOL 132 04/12/2025    LDLCALC 47.2 (L) 04/12/2025    HDL 57 04/12/2025    TRIG 139 04/12/2025     Coagulation:   Lab Results   Component Value Date    INR 1.0 09/27/2024    APTT 30.3 11/30/2022     Hgb A1C:   Lab Results   Component Value Date    HGBA1C 5.2 04/12/2025     TSH:   Lab Results   Component Value Date    TSH 0.127 (L) 04/12/2025       Documentation personally reviewed:  Notes: Notes: ED notes and H&P     Assessment and plan:  80 yo woman with questionable history of seizures in the past, presents after 2 GTCs. Has been off seizure medication for years. She is currently now suffering with opioid withdrawal. This could be the reason behind his seizures. She was loaded with 2400 of keppra. Now on keppra 500 MG BID. Responds to voice but drowsy. CT head unremarkable     - Continue keppra 500 mg BID for now. Also on pregabalin 200 mg TID     - Consider EEG since unclear if hard to say if this are ongoing seizures, vs post ictal state, vs withdrawal AMS vs medication drowsiness     - Opioid withdrawal treatment per primary team     - Can defer MRI for now    - Seizure precautions     Post charge discharge plan:  Clinic follow up: Epilepsy    Visit Type: in-person only  Timeframe: 2 weeks    Additional Physical Exam, History, & ROS  Review of Systems   Constitutional:  Negative for fever.   HENT:  Negative for hearing loss.    Eyes:  Negative for blurred vision.   Respiratory:  Negative for cough.    Cardiovascular:  Negative for chest pain.   Gastrointestinal:  Negative for heartburn.   Genitourinary:  Negative for dysuria.   Musculoskeletal:  Negative for myalgias.   Skin:  Negative for rash.   Neurological:  Negative for dizziness, tingling, tremors, sensory change, speech change, focal weakness, seizures, loss of consciousness, weakness and headaches.    Psychiatric/Behavioral:  Negative for depression.      Physical Exam  Vitals and nursing note reviewed.   Neurological:      Mental Status: She is disoriented.      Motor: Weakness present.       Past Medical History:   Diagnosis Date    Anemia     Arthritis     Bleeding ulcer 07/2016    Chronic pain     DDD (degenerative disc disease), cervical     DDD (degenerative disc disease), lumbar     Depression     Disc degeneration, lumbosacral     Diverticulitis     Encounter for blood transfusion     Hypertension     IBS (irritable bowel syndrome)     Neuropathy of both feet     Seizures     none  since 2017    Umbilical hernia 2020     Past Surgical History:   Procedure Laterality Date    ARTHROPLASTY, HIP, GIRDLESTONE, POSTERIOR APPROACH Left 1/19/2024    Procedure: ARTHROPLASTY, HIP, GIRDLESTONE, POSTERIOR APPROACH;  Surgeon: Bulmaro Tellez MD;  Location: [facility] OR [address] [place] [address];  Service: Orthopedics;  Laterality: Left;    ARTHROPLASTY, HIP, GIRDLESTONE, POSTERIOR APPROACH Left 1/25/2024    Procedure: Irrigation and debridement left hip,;  Surgeon: Juanito Painting MD;  Location: [facility] OR [address] [place] [address];  Service: Orthopedics;  Laterality: Left;    ARTHROPLASTY, HIP, GIRDLESTONE, POSTERIOR APPROACH Left 2/15/2024    Procedure: Revision hip antibiotic spacer head exchange, left, lateral, peg board, depuy osteomed in room, vanc, ancef, txa,;  Surgeon: Bulmaro Tellez MD;  [facility];  Service: Orthopedics;  Laterality: Left;    BACK SURGERY      BREAST BIOPSY      BREAST SURGERY Right     lumpectomy    CAUDAL EPIDURAL STEROID INJECTION N/A 01/28/2022    Procedure: Injection-steroid-epidural-caudal;  Surgeon: Luzmaria Marcelino MD;  Location: [facility] OR;  Service: Pain Management;  Laterality: N/A;    COLONOSCOPY N/A 01/14/2022    Procedure: COLONOSCOPY;  Surgeon: Abby Landers MD;  Location: [facility];  Service: Endoscopy;  Laterality: N/A;    COLONOSCOPY N/A 11/30/2024    Procedure: COLONOSCOPY;   Surgeon: Marcio Nguyễn III, MD;  Location: [facility] ENDO;  Service: Endoscopy;  Laterality: N/A;    ENDOSCOPIC ULTRASOUND OF UPPER GASTROINTESTINAL TRACT N/A 07/21/2020    Procedure: ULTRASOUND, UPPER GI TRACT, ENDOSCOPIC;  Surgeon: Marcio Nguyễn III, MD;  Location: [facility] ENDO;  Service: Endoscopy;  Laterality: N/A;    ENDOSCOPIC ULTRASOUND OF UPPER GASTROINTESTINAL TRACT N/A 9/27/2024    Procedure: ULTRASOUND, UPPER GI TRACT, ENDOSCOPIC;  Surgeon: Marcio Nguyễn III, MD;  Location: [facility] ENDO;  Service: Endoscopy;  Laterality: N/A;    ESOPHAGOGASTRODUODENOSCOPY N/A 01/14/2022    Procedure: EGD (ESOPHAGOGASTRODUODENOSCOPY);  Surgeon: Abby Landers MD;  Location: [facility] ENDO;  Service: Endoscopy;  Laterality: N/A;    ESOPHAGOGASTRODUODENOSCOPY N/A 06/10/2022    Procedure: EGD (ESOPHAGOGASTRODUODENOSCOPY);  Surgeon: Abby Landers MD;  Location: [facility];  Service: Endoscopy;  Laterality: N/A;    EYE SURGERY      cataract    FLAP GRAFT, LOCAL Left 2/15/2024    Procedure: FLAP GRAFT, LOCAL;  Surgeon: Bulmaro Tellez MD;  Location: [facility] OR 2ND FLR;  Service: Orthopedics;  Laterality: Left;    HYSTERECTOMY      INTRAMEDULLARY RODDING OF TROCHANTER OF FEMUR Left 12/11/2018    Procedure: INSERTION, INTRAMEDULLARY SANTOS, FEMUR, TROCHANTER;  Surgeon: Eulalio De La Cruz MD;  Location: [facility] OR;  Service: Orthopedics;  Laterality: Left;    IRRIGATION AND DEBRIDEMENT OF LOWER EXTREMITY Left 1/19/2024    Procedure: IRRIGATION AND DEBRIDEMENT, LOWER EXTREMITY;  Surgeon: Bulmaro Tellez MD;  Location: [facility] OR 2ND FLR;  Service: Orthopedics;  Laterality: Left;    IRRIGATION AND DEBRIDEMENT OF LOWER EXTREMITY Left 2/15/2024    Procedure: IRRIGATION AND DEBRIDEMENT, LOWER EXTREMITY;  Surgeon: Bulmaro Tellez MD;  Location: [facility] OR 2ND FLR;  Service: Orthopedics;  Laterality: Left;    LAPAROSCOPIC CHOLECYSTECTOMY N/A 9/8/2024    Procedure: CHOLECYSTECTOMY, LAPAROSCOPIC;  Surgeon: Cipriano Ambrosio MD;   Location: [facility] OR;  Service: General;  Laterality: N/A;    REPAIR OF EPIGASTRIC HERNIA N/A 12/7/2023    Procedure: REPAIR, HERNIA, EPIGASTRIC;  Surgeon: Vipul Escobar MD;  Location: [facility] OR;  Service: General;  Laterality: N/A;    SPINAL CORD STIMULATOR IMPLANT  09/18/2013    and removal    SPINE SURGERY  2006    lumbar L2-S1 decompression.    SPINE SURGERY      cervical decompression    TONSILLECTOMY       Family History   Problem Relation Name Age of Onset    Diabetes Mother      Hypertension Mother      Irritable bowel syndrome Mother      Diabetes Father          insulin dependent    Hypertension Father      Heart disease Father      Coronary artery disease Father      Depression Father      Diabetes Sister      Diabetes Brother      COPD Brother         Diagnoses  Problem Noted   Seizures Concurrent With and Due to Substance Withdrawal 7/5/2025   History of Seizures 6/30/2022       Gareth Shannon MD    Neurology consultation requested by spoke provider. Audiovisual encounter with the patient performed using a secure connection.  Results and impressions from the visit are documented on this note and were communicated to the consulting provider/team via direct communication. The note has been shared for addition to the patients electronic medical record.

## 2025-07-05 NOTE — NURSING
Pt very somnolent, AAOx3, arouses to voice, urine sent for UDS, seizure and safety precautions maintained, continuing to monitor

## 2025-07-05 NOTE — ED PROVIDER NOTES
Encounter Date: 7/4/2025       History     Chief Complaint   Patient presents with    Seizures     Pt hx of seizures over 15 years ago.  Pt not on seizure medication anymore.  Pt has two witnessed seizures today and EMS reports a postictal state.  Pt seen here this morning with abd pain     Froilan Ray is a 79 y.o. female with hx anemia, PUD, htn, diverticulitis, seizure d/o, hernia repair presenting with a one-week history of left upper and left lower quadrant abdominal pain, constant, moderate to severe associated with nausea and postprandial increase in pain accompanied by frequent, loose, dark stools.  She denies bright red blood in the stools.  She endorses some emesis today..  She has been mildly dyspneic with no chest pain.  She has had lightheadedness without syncope or presyncope.  She denies any obvious hematemesis.  No urinary complaints such as dysuria or hematuria.      THE ABOVE-NOTED FROM HER ADMISSION FROM EARLIER TODAY.  SHE IS HERE TODAY BECAUSE SHE HAD WHAT WE THINK WAS AN APPARENT SEIZURE WITH POSTICTAL STATE.  THE PATIENT APPARENTLY HAD 2 SEIZURES 3 HOURS APART.  GCS NOW 15 WAS POSTICTAL.  BLOOD GLUCOSE 129.  The patient has a chronic use of opioids and is yawning and I suspect she may have had a possible withdrawal type seizure.        Review of patient's allergies indicates:  No Known Allergies  Past Medical History:   Diagnosis Date    Anemia     Arthritis     Bleeding ulcer 07/2016    Chronic pain     DDD (degenerative disc disease), cervical     DDD (degenerative disc disease), lumbar     Depression     Disc degeneration, lumbosacral     Diverticulitis     Encounter for blood transfusion     Hypertension     IBS (irritable bowel syndrome)     Neuropathy of both feet     Seizures     none  since 2017    Umbilical hernia 2020     Past Surgical History:   Procedure Laterality Date    ARTHROPLASTY, HIP, GIRDLESTONE, POSTERIOR APPROACH Left 1/19/2024    Procedure: ARTHROPLASTY, HIP,  GIRDLESTONE, POSTERIOR APPROACH;  Surgeon: Bulmaro Tellez MD;  Location: [facility] OR 2ND FLR;  Service: Orthopedics;  Laterality: Left;    ARTHROPLASTY, HIP, GIRDLESTONE, POSTERIOR APPROACH Left 1/25/2024    Procedure: Irrigation and debridement left hip,;  Surgeon: Juanito Painting MD;  Location: [facility] OR 2ND FLR;  Service: Orthopedics;  Laterality: Left;    ARTHROPLASTY, HIP, GIRDLESTONE, POSTERIOR APPROACH Left 2/15/2024    Procedure: Revision hip antibiotic spacer head exchange, left, lateral, peg board, depuy osteomed in room, vanc, ancef, txa,;  Surgeon: Bulmaro Tellez MD;  Location: [facility] OR 2ND FLR;  Service: Orthopedics;  Laterality: Left;    BACK SURGERY      BREAST BIOPSY      BREAST SURGERY Right     lumpectomy    CAUDAL EPIDURAL STEROID INJECTION N/A 01/28/2022    Procedure: Injection-steroid-epidural-caudal;  Surgeon: Luzmaria Marcelino MD;  Location: [facility] OR;  Service: Pain Management;  Laterality: N/A;    COLONOSCOPY N/A 01/14/2022    Procedure: COLONOSCOPY;  Surgeon: Abby Landers MD;  Location: [facility];  Service: Endoscopy;  Laterality: N/A;    COLONOSCOPY N/A 11/30/2024    Procedure: COLONOSCOPY;  Surgeon: Marcio Nguyễn III, MD;  Location: [facility];  Service: Endoscopy;  Laterality: N/A;    ENDOSCOPIC ULTRASOUND OF UPPER GASTROINTESTINAL TRACT N/A 07/21/2020    Procedure: ULTRASOUND, UPPER GI TRACT, ENDOSCOPIC;  Surgeon: Marcio Nguyễn III, MD;  Location: [facility] ENDO;  Service: Endoscopy;  Laterality: N/A;    ENDOSCOPIC ULTRASOUND OF UPPER GASTROINTESTINAL TRACT N/A 9/27/2024    Procedure: ULTRASOUND, UPPER GI TRACT, ENDOSCOPIC;  Surgeon: Marcio Nguyễn III, MD;  Location: [facility];  Service: Endoscopy;  Laterality: N/A;    ESOPHAGOGASTRODUODENOSCOPY N/A 01/14/2022    Procedure: EGD (ESOPHAGOGASTRODUODENOSCOPY);  Surgeon: Abby Landers MD;  Location: [facility] ENDO;  Service: Endoscopy;  Laterality: N/A;    ESOPHAGOGASTRODUODENOSCOPY N/A 06/10/2022    Procedure:  EGD (ESOPHAGOGASTRODUODENOSCOPY);  Surgeon: Abby Landers MD;  Location: [facility];  Service: Endoscopy;  Laterality: N/A;    EYE SURGERY      cataract    FLAP GRAFT, LOCAL Left 2/15/2024    Procedure: FLAP GRAFT, LOCAL;  Surgeon: Bulmaro Tellez MD;  Location: [facility] OR 2ND FLR;  Service: Orthopedics;  Laterality: Left;    HYSTERECTOMY      INTRAMEDULLARY RODDING OF TROCHANTER OF FEMUR Left 12/11/2018    Procedure: INSERTION, INTRAMEDULLARY SANTOS, FEMUR, TROCHANTER;  Surgeon: Eulalio De La Cruz MD;  Location: [facility] OR;  Service: Orthopedics;  Laterality: Left;    IRRIGATION AND DEBRIDEMENT OF LOWER EXTREMITY Left 1/19/2024    Procedure: IRRIGATION AND DEBRIDEMENT, LOWER EXTREMITY;  Surgeon: Bulmaro Tellez MD;  Location: [facility] OR 2ND FLR;  Service: Orthopedics;  Laterality: Left;    IRRIGATION AND DEBRIDEMENT OF LOWER EXTREMITY Left 2/15/2024    Procedure: IRRIGATION AND DEBRIDEMENT, LOWER EXTREMITY;  Surgeon: Bulmaro Tellez MD;  [facility];  Service: Orthopedics;  Laterality: Left;    LAPAROSCOPIC CHOLECYSTECTOMY N/A 9/8/2024    Procedure: CHOLECYSTECTOMY, LAPAROSCOPIC;  Surgeon: Cipriano Ambrosio MD;  Location: [facility] OR;  Service: General;  Laterality: N/A;    REPAIR OF EPIGASTRIC HERNIA N/A 12/7/2023    Procedure: REPAIR, HERNIA, EPIGASTRIC;  Surgeon: Vipul Escobar MD;  Location: [facility] OR;  Service: General;  Laterality: N/A;    SPINAL CORD STIMULATOR IMPLANT  09/18/2013    and removal    SPINE SURGERY  2006    lumbar L2-S1 decompression.    SPINE SURGERY      cervical decompression    TONSILLECTOMY       Family History   Problem Relation Name Age of Onset    Diabetes Mother      Hypertension Mother      Irritable bowel syndrome Mother      Diabetes Father          insulin dependent    Hypertension Father      Heart disease Father      Coronary artery disease Father      Depression Father      Diabetes Sister      Diabetes Brother      COPD Brother       Social History[1]  Review of  Systems    Physical Exam     Initial Vitals [07/04/25 2114]   BP Pulse Resp Temp SpO2   (!) 162/83 99 18 98.6 °F (37 °C) 97 %      MAP       --         Physical Exam    ED Course   Procedures  Labs Reviewed   COMPREHENSIVE METABOLIC PANEL - Abnormal       Result Value    Sodium 137      Potassium 3.2 (*)     Chloride 104      CO2 20 (*)     Glucose 104      BUN 9      Creatinine 0.6      Calcium 9.3      Protein Total 6.8      Albumin 3.8      Bilirubin Total 0.3            AST 14      ALT 9 (*)     Anion Gap 13      eGFR >60     TROPONIN I HIGH SENSITIVITY - Abnormal    Troponin High Sensitive 30.1 (*)    CBC WITH DIFFERENTIAL - Abnormal    WBC 6.62      RBC 4.06      Hgb 9.8 (*)     Hct 32.3 (*)     MCV 80 (*)     MCH 24.1 (*)     MCHC 30.3 (*)     RDW 15.5 (*)     Platelet Count 453 (*)     MPV 9.3      Nucleated RBC 0      Neut % 86.7 (*)     Lymph % 8.2 (*)     Mono % 4.2      Eos % 0.0      Basophil % 0.3      Imm Grans % 0.6 (*)     Neut # 5.7      Lymph # 0.54 (*)     Mono # 0.28 (*)     Eos # 0.00      Baso # 0.02      Imm Grans # 0.04     CBC W/ AUTO DIFFERENTIAL    Narrative:     The following orders were created for panel order CBC auto differential.  Procedure                               Abnormality         Status                     ---------                               -----------         ------                     CBC with Differential[[id number]]       Abnormal            Final result                 Please view results for these tests on the individual orders.   LEVETIRACETAM  (KEPPRA) LEVEL   EXTRA TUBES    Narrative:     The following orders were created for panel order EXTRA TUBES.  Procedure                               Abnormality         Status                     ---------                               -----------         ------                     Light Blue Top Hold[[id number]]                             In process                 Lavender Top Hold[[id number]]                                In process                 Gold Top Hold[[id number]]                                   In process                   Please view results for these tests on the individual orders.   LIGHT BLUE TOP HOLD   LAVENDER TOP HOLD   GOLD TOP HOLD   TROPONIN I HIGH SENSITIVITY     EKG Readings: (Independently Interpreted)   EKG REVEALS NORMAL SINUS RHYTHM HEART RATE 94 ND INTERVAL NORMAL NO ST ELEVATION       Imaging Results              CT Head Without Contrast (Final result)  Result time 07/04/25 23:01:13      Final result by Cristobal Reece MD (07/04/25 23:01:13)                   Impression:      No acute findings.      Electronically signed by: Cristobal Reece  Date:    07/04/2025  Time:    23:01               Narrative:    EXAMINATION:  CT HEAD WITHOUT CONTRAST    CLINICAL HISTORY:  SEIZURES;    TECHNIQUE:  Low dose axial images were obtained through the head.  Coronal and sagittal reformations were also performed. Contrast was not administered.    COMPARISON:  08/07/2024    FINDINGS:  Intracranial compartment:    Ventricles and sulci are normal in size for age without evidence of hydrocephalus. No extra-axial blood or fluid collections.    Mild chronic microvascular ischemia.  No parenchymal mass, hemorrhage, edema or major vascular distribution infarct.    Skull/extracranial contents (limited evaluation): No fracture. Mastoid air cells and paranasal sinuses are essentially clear.                                       Medications   levETIRAcetam (Keppra) 2,400 mg in 0.9% NaCl 250 mL IVPB (has no administration in time range)   potassium bicarbonate disintegrating tablet 50 mEq (has no administration in time range)   oxyCODONE-acetaminophen 5-325 mg per tablet 2 tablet (2 tablets Oral Given 7/4/25 6012)     Medical Decision Making  Patient with a history of seizures had 2 seizures today.  She has been off of her seizure medicines for years.  She arrived here with headache improved with medicines  after negative head CT.  Will need to be admitted for having 2 seizures as well as for elevated troponin.  Patient was actually evaluated earlier today by ER doctor for abdominal discomfort.Emile Kelly MD  11:27 PM 07/04/2025          Amount and/or Complexity of Data Reviewed  Labs: ordered.  Radiology: ordered.    Risk  Prescription drug management.  Decision regarding hospitalization.              Attending Attestation:         Attending Critical Care:   Critical Care Times:   Direct Patient Care (initial evaluation, reassessments, and time considering the case)................................................................10 minutes.   Additional History from reviewing old medical records or taking additional history from the family, EMS, PCP, etc.......................5 minutes.   Ordering, Reviewing, and Interpreting Diagnostic Studies...............................................................................................................5 minutes.   Documentation..................................................................................................................................................................................5 minutes.   Consultation with other Physicians. .................................................................................................................................................5 minutes.   Consultation with the patient's family directly relating to the patient's condition, care, and DNR status (when patient unable)......5 minutes.   ==============================================================  Total Critical Care Time - exclusive of procedural time: 35 minutes.  ==============================================================  The following critical care procedures were done by me (see procedure notes): pulse oximetry.   Critical care was time spent personally by me on the following activities: examination of patient, review of old charts,  ordering lab, x-rays, and/or EKG, discussion with consultants and ordering and performing treatments and interventions.   Critical Care Condition: critical                                  Clinical Impression:  Final diagnoses:  [R56.9] Seizure (Primary)          ED Disposition Condition    Admit                     Emile Kelly MD  07/04/25 6246         [1]   Social History  Tobacco Use    Smoking status: Never    Smokeless tobacco: Never   Substance Use Topics    Alcohol use: No    Drug use: No        Emile Kelly MD  07/04/25 0689

## 2025-07-05 NOTE — NURSING
Patient report phoned to Kaelyn ROMERO on 1100   patient will be transferred to room 1117.   Family notified patient transfer

## 2025-07-05 NOTE — ASSESSMENT & PLAN NOTE
Chronic.    - Current loose bowel movements reported, none during this hospitalization today  - Hemoccult negative  - Liver enzymes unremarkable  - Continue to monitor    Lab Results   Component Value Date    ALT 9 (L) 07/04/2025    AST 14 07/04/2025    ALKPHOS 100 07/04/2025    BILITOT 0.3 07/04/2025       Results for orders placed or performed during the hospital encounter of 07/04/25 (from the past 2160 hours)   CT Abdomen Pelvis With IV Contrast NO Oral Contrast    Narrative    EXAMINATION:  CT ABDOMEN PELVIS WITH IV CONTRAST    CLINICAL HISTORY:  LLQ abdominal pain;    TECHNIQUE:  Low dose axial images, sagittal and coronal reformations were obtained from the neck base to the pubic symphysis after the administration of 100 cc Omnipaque 350 intravenous contrast.  Oral contrast was not administered.    COMPARISON:  Comparison made with the patient's previous CT scan of the abdomen pelvis with contrast 04/23/2025.    FINDINGS:  Substantial anatomic distortion of the body cavities is noted as there is evidence fairly prominent complex alignment of the lumbar spine with apex of the scoliotic curvature centered over the L3 vertebral body with minimal lateral subluxation of L2 upon L3.  Chronic compression fractures are established at both the L2 and L3 vertebral body levels with evidence of moderate apical disturbance along the superior edge of the L4 vertebral body that appear chronic in time.  The overall bone mineral density structure appears markedly osteopenic.  Old healed fracture involving the right parasymphyseal symphysis pubis with evidence of osseous bridging.  Chronic degenerative changes of the right hip articulation.  Patient has undergone previous left hip arthroplasty causing significant band of beam hardening artifact.  Spherical low-density calcified nodule projects in the patient's right breast tissue likely reflecting area of benign fat necrosis.    Visualized lung bases demonstrates evidence of  minimal pulmonary emphysematous changes, 4 mm nodule projecting the patient's right lower lobe peripherally (series 3, image number 16), and calcified nodule projecting in the left upper lobe anteriorly (series 3, image number 3.  Minimal left lower lobe parenchymal scarring.  Thoracic aorta is tortuous becoming midline at the diaphragmatic hiatus.  Mild circumferential thickening of the distal esophagus above the level of the gastroesophageal junction on the basis of longstanding reflux are vagina is a chronic primary or infectious esophagitis.    Liver appears normal in size and of low-density with scattered spherical low-density lesions throughout the right hepatic lobe that are too small to characterize by CT criteria but favor benign cyst, post cholecystectomy changes with prominence involving the intrahepatic ducts.  The spleen appears normal in size and overall density with a small splenule about the inner margin.  Stomach appears decompressed with moderate thickening of the gastric antrum pre-pyloric region likely secondary to underdistention, although an element of active inflammatory changes cannot be excluded.  Diffuse inflammatory stranding in the central mesentery is noted.  Adrenal glands appear normal.  Kidneys excrete contrast satisfactory appears small spherical low-density cyst within the anterior interpolar region of the right kidney.  There is a small 3 mm stone in the inferior pole left kidney.    Persistent thickening along the descending segment of the colon which is of active colitis the similar in appearance as compared to previous.  There is some involvement of the ascending colon.  Liquified gas and stool throughout the entire colonic lumen is noted.  No evidence of intestinal obstruction.  The vascular structures appear normal.    Pelvic structures show evidence of normal appearance of the urinary bladder.  Small amount of free fluid in the right side of the pelvic cavity.  Post  hysterectomy pelvis is noted.      Impression    1.  No CT evidence of acute intra-abdominal findings.    2.  Diffuse low-density changes noted throughout the descending colon on the basis of active colitis that appears similar to that from the patient's most recent examination with no evidence of detrimental change upon comparison.  Liquified gas and stool persist within the colonic lumen.    3.  Small amount of free fluid in the right side of the pelvic cavity.    4.  Prominence of all vein the intra biliary tracts in the setting of a resected gallbladder that is in expected findings patient of post cholecystectomy state.    5.  Several small spherical low-density cyst are established throughout the hepatic architecture some which appear too small to characterize by CT criteria but favor benign cyst.    6.  Diffuse thickening of the distal esophagus above the level the GE junction suggestive of primary esophagitis or sequela of chronic reflux esophagitis.      Electronically signed by: Kurtis Logan MD  Date:    07/04/2025  Time:    14:19

## 2025-07-05 NOTE — ASSESSMENT & PLAN NOTE
Decades of opiate use per family.  Currently prescribed 10/325 4 times daily.    - Family reports recent decrease in use with concurrent increase in marijuana use  - No opioids taken today per family  - Family reports unknown status of opioid use yesterday

## 2025-07-05 NOTE — PLAN OF CARE
Problem: Pain Acute  Goal: Optimal Pain Control and Function  Outcome: Progressing     Problem: Skin Injury Risk Increased  Goal: Skin Health and Integrity  Outcome: Progressing     Problem: Fall Injury Risk  Goal: Absence of Fall and Fall-Related Injury  Outcome: Progressing     Problem: Adult Inpatient Plan of Care  Goal: Plan of Care Review  Outcome: Progressing

## 2025-07-05 NOTE — ASSESSMENT & PLAN NOTE
Noted.  Seizure etiology unknown.  Possibly also opiate withdrawal per family.     - See seizures concurrent with and due to substance withdrawal plan

## 2025-07-05 NOTE — HPI
Froilan Duran is a 79-year-old female with a past medical history of anemia, peptic ulcer disease, hypertension,  take colitis, colitis, chronic opioid dependence, and seizures who presents to the ED for the 2nd time today.  Original complaints or multiple days abdominal pain that were described as moderate to severe with nausea vomiting accompanied by frequent loose dark stools.  Hemoccult was negative.  Family reports she was also diaphoretic last night.  After patient was discharged after negative findings this morning, patient had 2 episodes of seizure activity approximately 3 hours apart.  EMS reports postictal state on arrival.  Family reports that she has been reducing her opioid medications and increasing marijuana use.  It is believed by family she may have not been taking her medication last night, and they know she did not take it today due to being in the hospital for the abdominal pain earlier today.  ED workup was positive for ictal tongue biting, mildly elevated troponin of 30.1 and repeat of 52.9, 2nd troponin pending, hypokalemia at 3.2, and anemia with H&H of 9.8/32.3.  2400 mg of Keppra given in ED. hospital Medicine was consulted, and patient and family are agreeable to hospital admission for close monitoring, medical management, and further workup.

## 2025-07-05 NOTE — PROGRESS NOTES
Pharmacist Renal Dose Adjustment Note    Froilan Ray is a 79 y.o. female being treated with the medication Famotidine.    Patient Data:    Vital Signs (Most Recent):  Temp: 97.6 °F (36.4 °C) (07/05/25 0734)  Pulse: 79 (07/05/25 0734)  Resp: 16 (07/05/25 0734)  BP: (!) 164/80 (07/05/25 0734)  SpO2: 96 % (07/05/25 0734) Vital Signs (72h Range):  Temp:  [97.6 °F (36.4 °C)-98.6 °F (37 °C)]   Pulse:  []   Resp:  [12-24]   BP: (156-183)/(74-86)   SpO2:  [96 %-100 %]      Recent Labs   Lab 07/04/25  1242 07/04/25  2146 07/05/25  0335   CREATININE 0.5 0.6 0.6     Serum creatinine: 0.6 mg/dL 07/05/25 0335  Estimated creatinine clearance: 73.5 mL/min    Famotidine 20 mg po daily as needed GERD will be changed to Famotidine 20 mg po twice daily as needed for GERD.    Pharmacist's Name: Karina Garcia  Pharmacist's Extension: [phone]

## 2025-07-05 NOTE — ASSESSMENT & PLAN NOTE
Two witnessed seizures at home within 3 hours of each other.  Ictal tongue biting.  Family reports she has not taken seizure medication in years.  Family is unsure if previous seizures were due to opiate withdrawal.    - 2400 mg Keppra given in ED  - 500 mg Keppra b.i.d.  - Consult neurology  - Resume home opiate dose at decreased frequency  - Urine drug screen pending  - Seizure precautions

## 2025-07-05 NOTE — SUBJECTIVE & OBJECTIVE
Past Medical History:   Diagnosis Date    Anemia     Arthritis     Bleeding ulcer 07/2016    Chronic pain     DDD (degenerative disc disease), cervical     DDD (degenerative disc disease), lumbar     Depression     Disc degeneration, lumbosacral     Diverticulitis     Encounter for blood transfusion     Hypertension     IBS (irritable bowel syndrome)     Neuropathy of both feet     Seizures     none  since 2017    Umbilical hernia 2020       Past Surgical History:   Procedure Laterality Date    ARTHROPLASTY, HIP, GIRDLESTONE, POSTERIOR APPROACH Left 1/19/2024    Procedure: ARTHROPLASTY, HIP, GIRDLESTONE, POSTERIOR APPROACH;  Surgeon: Bulmaro Tellez MD;  [facility];  Service: Orthopedics;  Laterality: Left;    ARTHROPLASTY, HIP, GIRDLESTONE, POSTERIOR APPROACH Left 1/25/2024    Procedure: Irrigation and debridement left hip,;  Surgeon: Juanito Painting MD;  [facility];  Service: Orthopedics;  Laterality: Left;    ARTHROPLASTY, HIP, GIRDLESTONE, POSTERIOR APPROACH Left 2/15/2024    Procedure: Revision hip antibiotic spacer head exchange, left, lateral, peg board, depuy osteomed in room, vanc, ancef, txa,;  Surgeon: Bulmaro Tellez MD;  [facility];  Service: Orthopedics;  Laterality: Left;    BACK SURGERY      BREAST BIOPSY      BREAST SURGERY Right     lumpectomy    CAUDAL EPIDURAL STEROID INJECTION N/A 01/28/2022    Procedure: Injection-steroid-epidural-caudal;  Surgeon: Luzmaria Marcelino MD;  Location: [facility] OR;  Service: Pain Management;  Laterality: N/A;    COLONOSCOPY N/A 01/14/2022    Procedure: COLONOSCOPY;  Surgeon: Abby Landers MD;  Location: [facility];  Service: Endoscopy;  Laterality: N/A;    COLONOSCOPY N/A 11/30/2024    Procedure: COLONOSCOPY;  Surgeon: Marcio Nguyễn III, MD;  Location: [facility] ENDO;  Service: Endoscopy;  Laterality: N/A;    ENDOSCOPIC ULTRASOUND OF UPPER GASTROINTESTINAL TRACT N/A 07/21/2020    Procedure: ULTRASOUND,  UPPER GI TRACT, ENDOSCOPIC;  Surgeon: Marcio Nguyễn III, MD;  Location: [facility] ENDO;  Service: Endoscopy;  Laterality: N/A;    ENDOSCOPIC ULTRASOUND OF UPPER GASTROINTESTINAL TRACT N/A 9/27/2024    Procedure: ULTRASOUND, UPPER GI TRACT, ENDOSCOPIC;  Surgeon: Marcio Nguyễn III, MD;  Location: [facility] ENDO;  Service: Endoscopy;  Laterality: N/A;    ESOPHAGOGASTRODUODENOSCOPY N/A 01/14/2022    Procedure: EGD (ESOPHAGOGASTRODUODENOSCOPY);  Surgeon: Abby Landers MD;  Location: [facility] ENDO;  Service: Endoscopy;  Laterality: N/A;    ESOPHAGOGASTRODUODENOSCOPY N/A 06/10/2022    Procedure: EGD (ESOPHAGOGASTRODUODENOSCOPY);  Surgeon: Abby Landers MD;  Location: [facility] ENDO;  Service: Endoscopy;  Laterality: N/A;    EYE SURGERY      cataract    FLAP GRAFT, LOCAL Left 2/15/2024    Procedure: FLAP GRAFT, LOCAL;  Surgeon: Bulmaro Tellez MD;  Location: [facility] OR 2ND FLR;  Service: Orthopedics;  Laterality: Left;    HYSTERECTOMY      INTRAMEDULLARY RODDING OF TROCHANTER OF FEMUR Left 12/11/2018    Procedure: INSERTION, INTRAMEDULLARY SANTOS, FEMUR, TROCHANTER;  Surgeon: Eulalio De La Cruz MD;  Location: [facility];  Service: Orthopedics;  Laterality: Left;    IRRIGATION AND DEBRIDEMENT OF LOWER EXTREMITY Left 1/19/2024    Procedure: IRRIGATION AND DEBRIDEMENT, LOWER EXTREMITY;  Surgeon: Bulmaro Tellez MD;  Location: [facility] OR 2ND FLR;  Service: Orthopedics;  Laterality: Left;    IRRIGATION AND DEBRIDEMENT OF LOWER EXTREMITY Left 2/15/2024    Procedure: IRRIGATION AND DEBRIDEMENT, LOWER EXTREMITY;  Surgeon: Bulmaro Tellez MD;  Location: [facility] OR 2ND FLR;  Service: Orthopedics;  Laterality: Left;    LAPAROSCOPIC CHOLECYSTECTOMY N/A 9/8/2024    Procedure: CHOLECYSTECTOMY, LAPAROSCOPIC;  Surgeon: Cipriano Ambrosio MD;  Location: [facility];  Service: General;  Laterality: N/A;    REPAIR OF EPIGASTRIC HERNIA N/A 12/7/2023    Procedure: REPAIR, HERNIA, EPIGASTRIC;  Surgeon: Vipul Escobar MD;  Location: [facility];   Service: General;  Laterality: N/A;    SPINAL CORD STIMULATOR IMPLANT  09/18/2013    and removal    SPINE SURGERY  2006    lumbar L2-S1 decompression.    SPINE SURGERY      cervical decompression    TONSILLECTOMY         Review of patient's allergies indicates:  No Known Allergies    Current Facility-Administered Medications on File Prior to Encounter   Medication    [COMPLETED] iohexoL (OMNIPAQUE 350) injection 100 mL    [COMPLETED] morphine injection 4 mg    [DISCONTINUED] ondansetron injection 4 mg     Current Outpatient Medications on File Prior to Encounter   Medication Sig    acetaminophen (TYLENOL) 325 MG tablet Take 2 tablets (650 mg total) by mouth every 8 (eight) hours as needed for Pain (pain scale 1-4).    amlodipine-benazepril 5-20 mg (LOTREL) 5-20 mg per capsule Take 1 capsule by mouth once daily.    cyclobenzaprine (FLEXERIL) 10 MG tablet Take 1 tablet (10 mg total) by mouth 3 (three) times daily as needed for Muscle spasms. (Patient not taking: Reported on 4/23/2025)    docusate sodium (COLACE) 100 MG capsule Take 1 capsule (100 mg total) by mouth 2 (two) times daily.    famotidine (PEPCID) 20 MG tablet Take 20 mg by mouth daily as needed for Heartburn.    levETIRAcetam (KEPPRA) 500 MG Tab Take 1 tablet (500 mg total) by mouth 2 (two) times daily.    multivitamin with minerals tablet Take 1 tablet by mouth once daily.    ondansetron (ZOFRAN-ODT) 4 MG TbDL Take 1 tablet (4 mg total) by mouth every 8 (eight) hours as needed (nausea, vomiting).    oxyCODONE-acetaminophen (PERCOCET)  mg per tablet Take 1 tablet by mouth every 4 (four) hours as needed for Pain. (Patient not taking: Reported on 4/23/2025)    pantoprazole (PROTONIX) 40 MG tablet Take 1 tablet (40 mg total) by mouth once daily.    pregabalin (LYRICA) 200 MG Cap Take 1 capsule (200 mg total) by mouth 3 (three) times daily.     Family History       Problem Relation (Age of Onset)    COPD Brother    Coronary artery disease Father     Depression Father    Diabetes Mother, Father, Sister, Brother    Heart disease Father    Hypertension Mother, Father    Irritable bowel syndrome Mother          Tobacco Use    Smoking status: Never    Smokeless tobacco: Never   Substance and Sexual Activity    Alcohol use: No    Drug use: No    Sexual activity: Not Currently     Review of Systems   Constitutional:  Positive for activity change. Negative for fatigue and fever.   Respiratory:  Negative for cough, shortness of breath and wheezing.    Cardiovascular:  Negative for chest pain, palpitations and leg swelling.   Gastrointestinal:  Positive for diarrhea. Negative for abdominal distention, abdominal pain, constipation, nausea and vomiting.   Endocrine: Negative for cold intolerance and heat intolerance.   Genitourinary:  Negative for difficulty urinating and dysuria.   Musculoskeletal:  Negative for arthralgias, gait problem, joint swelling and myalgias.   Allergic/Immunologic: Negative for environmental allergies.   Neurological:  Positive for seizures and weakness. Negative for dizziness, tremors, light-headedness, numbness and headaches.   Hematological:  Negative for adenopathy.     Objective:     Vital Signs (Most Recent):  Temp: 98.3 °F (36.8 °C) (07/05/25 0119)  Pulse: 80 (07/05/25 0119)  Resp: 14 (07/05/25 0119)  BP: (!) 167/79 (07/05/25 0119)  SpO2: 97 % (07/05/25 0119) Vital Signs (24h Range):  Temp:  [97.9 °F (36.6 °C)-98.6 °F (37 °C)] 98.3 °F (36.8 °C)  Pulse:  [] 80  Resp:  [14-24] 14  SpO2:  [97 %-100 %] 97 %  BP: (156-183)/(74-86) 167/79     Weight: 61.2 kg (135 lb)  Body mass index is 21.79 kg/m².     Physical Exam  Vitals and nursing note reviewed.   Constitutional:       General: She is sleeping. She is not in acute distress.     Appearance: Normal appearance. She is not ill-appearing.   HENT:      Head: Normocephalic and atraumatic.   Eyes:      Extraocular Movements: Extraocular movements intact.      Conjunctiva/sclera: Conjunctivae  normal.   Cardiovascular:      Rate and Rhythm: Normal rate and regular rhythm.      Pulses: Normal pulses.           Radial pulses are 2+ on the right side and 2+ on the left side.        Dorsalis pedis pulses are 2+ on the right side and 2+ on the left side.      Heart sounds: Normal heart sounds.   Pulmonary:      Effort: Pulmonary effort is normal. No tachypnea, bradypnea or respiratory distress.      Breath sounds: Normal breath sounds.   Abdominal:      General: Bowel sounds are normal. There is no distension.      Palpations: Abdomen is soft.      Tenderness: There is generalized abdominal tenderness.   Musculoskeletal:         General: Normal range of motion.      Cervical back: Normal range of motion. No rigidity or tenderness.      Right lower leg: No edema.      Left lower leg: No edema.   Skin:     General: Skin is warm and dry.      Capillary Refill: Capillary refill takes less than 2 seconds.   Neurological:      General: No focal deficit present.      Mental Status: She is oriented to person, place, and time. Mental status is at baseline.      GCS: GCS eye subscore is 3. GCS verbal subscore is 4. GCS motor subscore is 4.   Psychiatric:         Mood and Affect: Mood normal.                Significant Labs: All pertinent labs within the past 24 hours have been reviewed.  Recent Lab Results  (Last 5 results in the past 24 hours)        07/04/25  2330   07/04/25  2146   07/04/25  1303   07/04/25  1242   07/04/25  1241        Albumin   3.8     3.6         ALP   100     97         ALT   9     8         Anion Gap   13     12         AST   14     12         Baso #   0.02     0.03         Basophil %   0.3     0.5         BILIRUBIN TOTAL   0.3  Comment: For infants and newborns, interpretation of results should be based   on gestational age, weight and in agreement with clinical   observations.    Premature Infant recommended reference ranges:   0-24 hours:  <8.0 mg/dL   24-48 hours: <12.0 mg/dL   3-5 days:     <15.0 mg/dL   6-29 days:   <15.0 mg/dL     0.3  Comment: For infants and newborns, interpretation of results should be based   on gestational age, weight and in agreement with clinical   observations.    Premature Infant recommended reference ranges:   0-24 hours:  <8.0 mg/dL   24-48 hours: <12.0 mg/dL   3-5 days:    <15.0 mg/dL   6-29 days:   <15.0 mg/dL         BUN   9     10         Calcium   9.3     8.6         Chloride   104     107         CO2   20     20         Creatinine   0.6     0.5         eGFR   >60     >60         Eos #   0.00     0.00         Eos %   0.0     0.0         Glucose   104     107         Group & Rh         B POS       Hematocrit   32.3     32.1         Hemoglobin   9.8     9.7         Immature Grans (Abs)   0.04  Comment: Mild elevation in immature granulocytes is non specific and can be seen in a variety of conditions including stress response, acute inflammation, trauma and pregnancy. Correlation with other laboratory and clinical findings is essential.     0.03  Comment: Mild elevation in immature granulocytes is non specific and can be seen in a variety of conditions including stress response, acute inflammation, trauma and pregnancy. Correlation with other laboratory and clinical findings is essential.         Immature Granulocytes   0.6     0.5         INDIRECT KEMAL         NEG       Lipase       8         Lymph #   0.54     0.53         LYMPH %   8.2     8.8         Magnesium  1.9               MCH   24.1     24.0         MCHC   30.3     30.2         MCV   80     79         Mono #   0.28     0.14         Mono %   4.2     2.3         MPV   9.3     8.9         Neut #   5.7     5.3         Neut %   86.7     87.9         nRBC   0     0         OCCULT BLOOD STOOL     Negative           Platelet Count   453     431         Potassium   3.2     3.6         PROTEIN TOTAL   6.8     6.4         RBC   4.06     4.05         RDW   15.5     15.6         Sodium   137     139         Specimen  Outdate         07/07/2025 23:59       Troponin I High Sensitivity 52.9  Comment: Critical result TNIHS 52.9 pg/mL called to and read back by Keiko Fontenot RN ED at 05-Jul-2025 00:37 by Kushal.  Result Rechecked  Troponin results differ between methods. Do not use   results between Troponin methods interchangeably.    Alkaline Phospatase levels above 400 U/L may   cause false positive results.    Access hsTnI should not be used for patients taking   Asfotase chelsea (Strensiq).   30.1  Comment: Troponin results differ between methods. Do not use   results between Troponin methods interchangeably.    Alkaline Phospatase levels above 400 U/L may   cause false positive results.    Access hsTnI should not be used for patients taking   Asfotase chelsea (Strensiq).             WBC   6.62     6.01                                Significant Imaging: I have reviewed all pertinent imaging results/findings within the past 24 hours.  I have reviewed and interpreted all pertinent imaging results/findings within the past 24 hours.

## 2025-07-05 NOTE — SUBJECTIVE & OBJECTIVE
HPI:  79 y.o. female. Neurology consulted for seizures. She has a history of anemia, PUD, htn, diverticulitis, seizure d/o, hernia repair presenting with a one-week history of left upper and left lower quadrant abdominal pain, constant, moderate to severe associated with nausea and postprandial increase in pain accompanied by frequent, loose, dark stools. She has been off seizures medication for years without having any events up until now. She had 2 back to back. Has not had anymore since admission but is drowsy. CT head showed no abnormalities.      Images personally reviewed and interpreted:  Study: Head CT  Study Interpretation: as above    Additional studies reviewed:   Study: Cardiac_Neuro: none  Study Interpretation: N/A    Laboratory studies reviewed:  BMP:   Lab Results   Component Value Date     07/05/2025    K 3.0 (L) 07/05/2025     07/05/2025    CO2 22 (L) 07/05/2025    BUN 8 07/05/2025    CREATININE 0.6 07/05/2025    CALCIUM 8.9 07/05/2025     CBC:   Lab Results   Component Value Date    WBC 7.04 07/05/2025    RBC 4.04 07/05/2025    HGB 9.8 (L) 07/05/2025    HCT 32.4 (L) 07/05/2025    HCT 33 (L) 02/15/2024     07/05/2025    MCV 80 (L) 07/05/2025    MCH 24.3 (L) 07/05/2025    MCHC 30.2 (L) 07/05/2025     Lipid Panel:   Lab Results   Component Value Date    CHOL 132 04/12/2025    LDLCALC 47.2 (L) 04/12/2025    HDL 57 04/12/2025    TRIG 139 04/12/2025     Coagulation:   Lab Results   Component Value Date    INR 1.0 09/27/2024    APTT 30.3 11/30/2022     Hgb A1C:   Lab Results   Component Value Date    HGBA1C 5.2 04/12/2025     TSH:   Lab Results   Component Value Date    TSH 0.127 (L) 04/12/2025       Documentation personally reviewed:  Notes: Notes: ED notes and H&P     Assessment and plan:  78 yo woman with questionable history of seizures in the past, presents after 2 GTCs. Has been off seizure medication for years. She is currently now suffering with opioid withdrawal. This could be  the reason behind his seizures. She was loaded with 2400 of keppra. Now on keppra 500 MG BID. Responds to voice but drowsy. CT head unremarkable     - Continue keppra 500 mg BID for now. Also on pregabalin 200 mg TID     - Consider EEG since unclear if hard to say if this are ongoing seizures, vs post ictal state, vs withdrawal AMS vs medication drowsiness     - Can defer MRI for now    - Seizure precautions     Post charge discharge plan:  Clinic follow up: Epilepsy    Visit Type: in-person only  Timeframe: 2 weeks

## 2025-07-05 NOTE — H&P
[facility] Medicine  History & Physical    Patient Name: Froilan Ray  MRN: [id number]  Patient Class: OP- Observation  Admission Date: 7/4/2025  Attending Physician: Wally Jaffe MD   Primary Care Provider: Alphonse Boothe III, MD         Patient information was obtained from patient, spouse/SO, relative(s), past medical records, and ER records.     Subjective:     Principal Problem:Seizures concurrent with and due to substance withdrawal    Chief Complaint:   Chief Complaint   Patient presents with    Seizures     Pt hx of seizures over 15 years ago.  Pt not on seizure medication anymore.  Pt has two witnessed seizures today and EMS reports a postictal state.  Pt seen here this morning with abd pain        HPI: Froilan Duran is a 79-year-old female with a past medical history of anemia, peptic ulcer disease, hypertension,  take colitis, colitis, chronic opioid dependence, and seizures who presents to the ED for the 2nd time today.  Original complaints or multiple days abdominal pain that were described as moderate to severe with nausea vomiting accompanied by frequent loose dark stools.  Hemoccult was negative.  Family reports she was also diaphoretic last night.  After patient was discharged after negative findings this morning, patient had 2 episodes of seizure activity approximately 3 hours apart.  EMS reports postictal state on arrival.  Family reports that she has been reducing her opioid medications and increasing marijuana use.  It is believed by family she may have not been taking her medication last night, and they know she did not take it today due to being in the hospital for the abdominal pain earlier today.  ED workup was positive for ictal tongue biting, mildly elevated troponin of 30.1 and repeat of 52.9, 2nd troponin pending, hypokalemia at 3.2, and anemia with H&H of 9.8/32.3.  2400 mg of Keppra given in ED. hospital Medicine was consulted, and patient and  family are agreeable to hospital admission for close monitoring, medical management, and further workup.    Past Medical History:   Diagnosis Date    Anemia     Arthritis     Bleeding ulcer 07/2016    Chronic pain     DDD (degenerative disc disease), cervical     DDD (degenerative disc disease), lumbar     Depression     Disc degeneration, lumbosacral     Diverticulitis     Encounter for blood transfusion     Hypertension     IBS (irritable bowel syndrome)     Neuropathy of both feet     Seizures     none  since 2017    Umbilical hernia 2020       Past Surgical History:   Procedure Laterality Date    ARTHROPLASTY, HIP, GIRDLESTONE, POSTERIOR APPROACH Left 1/19/2024    Procedure: ARTHROPLASTY, HIP, GIRDLESTONE, POSTERIOR APPROACH;  Surgeon: Bulmaro Tellez MD;  [facility];  Service: Orthopedics;  Laterality: Left;    ARTHROPLASTY, HIP, GIRDLESTONE, POSTERIOR APPROACH Left 1/25/2024    Procedure: Irrigation and debridement left hip,;  Surgeon: Juanito Painting MD;  [facility];  Service: Orthopedics;  Laterality: Left;    ARTHROPLASTY, HIP, GIRDLESTONE, POSTERIOR APPROACH Left 2/15/2024    Procedure: Revision hip antibiotic spacer head exchange, left, lateral, peg board, depuy osteomed in room, vanc, ancef, txa,;  Surgeon: Bulmaro Tellez MD;  [facility];  Service: Orthopedics;  Laterality: Left;    BACK SURGERY      BREAST BIOPSY      BREAST SURGERY Right     lumpectomy    CAUDAL EPIDURAL STEROID INJECTION N/A 01/28/2022    Procedure: Injection-steroid-epidural-caudal;  Surgeon: Luzmaria Marcelino MD;  [facility];  Service: Pain Management;  Laterality: N/A;    COLONOSCOPY N/A 01/14/2022    Procedure: COLONOSCOPY;  Surgeon: Abby Landers MD;  Location: [facility];  Service: Endoscopy;  Laterality: N/A;    COLONOSCOPY N/A 11/30/2024    Procedure: COLONOSCOPY;  Surgeon: Marcio Nguyễn III, MD;  Location: [facility] ENDO;  Service: Endoscopy;   Laterality: N/A;    ENDOSCOPIC ULTRASOUND OF UPPER GASTROINTESTINAL TRACT N/A 07/21/2020    Procedure: ULTRASOUND, UPPER GI TRACT, ENDOSCOPIC;  Surgeon: Marcio Nguyễn III, MD;  Location: [facility] ENDO;  Service: Endoscopy;  Laterality: N/A;    ENDOSCOPIC ULTRASOUND OF UPPER GASTROINTESTINAL TRACT N/A 9/27/2024    Procedure: ULTRASOUND, UPPER GI TRACT, ENDOSCOPIC;  Surgeon: Marcio Nguyễn III, MD;  Location: [facility] ENDO;  Service: Endoscopy;  Laterality: N/A;    ESOPHAGOGASTRODUODENOSCOPY N/A 01/14/2022    Procedure: EGD (ESOPHAGOGASTRODUODENOSCOPY);  Surgeon: Abby Landers MD;  Location: [facility] ENDO;  Service: Endoscopy;  Laterality: N/A;    ESOPHAGOGASTRODUODENOSCOPY N/A 06/10/2022    Procedure: EGD (ESOPHAGOGASTRODUODENOSCOPY);  Surgeon: Abby Landers MD;  Location: [facility] ENDO;  Service: Endoscopy;  Laterality: N/A;    EYE SURGERY      cataract    FLAP GRAFT, LOCAL Left 2/15/2024    Procedure: FLAP GRAFT, LOCAL;  Surgeon: Bulmaro Tellez MD;  Location: [facility] OR 2ND FLR;  Service: Orthopedics;  Laterality: Left;    HYSTERECTOMY      INTRAMEDULLARY RODDING OF TROCHANTER OF FEMUR Left 12/11/2018    Procedure: INSERTION, INTRAMEDULLARY SANTOS, FEMUR, TROCHANTER;  Surgeon: Eulalio De La Cruz MD;  Location: [facility] OR;  Service: Orthopedics;  Laterality: Left;    IRRIGATION AND DEBRIDEMENT OF LOWER EXTREMITY Left 1/19/2024    Procedure: IRRIGATION AND DEBRIDEMENT, LOWER EXTREMITY;  Surgeon: Bulmaro Tellez MD;  Location: [facility] OR 2ND FLR;  Service: Orthopedics;  Laterality: Left;    IRRIGATION AND DEBRIDEMENT OF LOWER EXTREMITY Left 2/15/2024    Procedure: IRRIGATION AND DEBRIDEMENT, LOWER EXTREMITY;  Surgeon: Bulmaro Tellez MD;  Location: [facility] OR 2ND FLR;  Service: Orthopedics;  Laterality: Left;    LAPAROSCOPIC CHOLECYSTECTOMY N/A 9/8/2024    Procedure: CHOLECYSTECTOMY, LAPAROSCOPIC;  Surgeon: Cipriano Ambrosio MD;  Location: [facility] OR;  Service: General;  Laterality: N/A;    REPAIR OF EPIGASTRIC HERNIA  N/A 12/7/2023    Procedure: REPAIR, HERNIA, EPIGASTRIC;  Surgeon: Vipul Escobar MD;  Location: [facility];  Service: General;  Laterality: N/A;    SPINAL CORD STIMULATOR IMPLANT  09/18/2013    and removal    SPINE SURGERY  2006    lumbar L2-S1 decompression.    SPINE SURGERY      cervical decompression    TONSILLECTOMY         Review of patient's allergies indicates:  No Known Allergies    Current Facility-Administered Medications on File Prior to Encounter   Medication    [COMPLETED] iohexoL (OMNIPAQUE 350) injection 100 mL    [COMPLETED] morphine injection 4 mg    [DISCONTINUED] ondansetron injection 4 mg     Current Outpatient Medications on File Prior to Encounter   Medication Sig    acetaminophen (TYLENOL) 325 MG tablet Take 2 tablets (650 mg total) by mouth every 8 (eight) hours as needed for Pain (pain scale 1-4).    amlodipine-benazepril 5-20 mg (LOTREL) 5-20 mg per capsule Take 1 capsule by mouth once daily.    cyclobenzaprine (FLEXERIL) 10 MG tablet Take 1 tablet (10 mg total) by mouth 3 (three) times daily as needed for Muscle spasms. (Patient not taking: Reported on 4/23/2025)    docusate sodium (COLACE) 100 MG capsule Take 1 capsule (100 mg total) by mouth 2 (two) times daily.    famotidine (PEPCID) 20 MG tablet Take 20 mg by mouth daily as needed for Heartburn.    levETIRAcetam (KEPPRA) 500 MG Tab Take 1 tablet (500 mg total) by mouth 2 (two) times daily.    multivitamin with minerals tablet Take 1 tablet by mouth once daily.    ondansetron (ZOFRAN-ODT) 4 MG TbDL Take 1 tablet (4 mg total) by mouth every 8 (eight) hours as needed (nausea, vomiting).    oxyCODONE-acetaminophen (PERCOCET)  mg per tablet Take 1 tablet by mouth every 4 (four) hours as needed for Pain. (Patient not taking: Reported on 4/23/2025)    pantoprazole (PROTONIX) 40 MG tablet Take 1 tablet (40 mg total) by mouth once daily.    pregabalin (LYRICA) 200 MG Cap Take 1 capsule (200 mg total) by mouth 3 (three) times daily.      Family History       Problem Relation (Age of Onset)    COPD Brother    Coronary artery disease Father    Depression Father    Diabetes Mother, Father, Sister, Brother    Heart disease Father    Hypertension Mother, Father    Irritable bowel syndrome Mother          Tobacco Use    Smoking status: Never    Smokeless tobacco: Never   Substance and Sexual Activity    Alcohol use: No    Drug use: No    Sexual activity: Not Currently     Review of Systems   Constitutional:  Positive for activity change. Negative for fatigue and fever.   Respiratory:  Negative for cough, shortness of breath and wheezing.    Cardiovascular:  Negative for chest pain, palpitations and leg swelling.   Gastrointestinal:  Positive for diarrhea. Negative for abdominal distention, abdominal pain, constipation, nausea and vomiting.   Endocrine: Negative for cold intolerance and heat intolerance.   Genitourinary:  Negative for difficulty urinating and dysuria.   Musculoskeletal:  Negative for arthralgias, gait problem, joint swelling and myalgias.   Allergic/Immunologic: Negative for environmental allergies.   Neurological:  Positive for seizures and weakness. Negative for dizziness, tremors, light-headedness, numbness and headaches.   Hematological:  Negative for adenopathy.     Objective:     Vital Signs (Most Recent):  Temp: 98.3 °F (36.8 °C) (07/05/25 0119)  Pulse: 80 (07/05/25 0119)  Resp: 14 (07/05/25 0119)  BP: (!) 167/79 (07/05/25 0119)  SpO2: 97 % (07/05/25 0119) Vital Signs (24h Range):  Temp:  [97.9 °F (36.6 °C)-98.6 °F (37 °C)] 98.3 °F (36.8 °C)  Pulse:  [] 80  Resp:  [14-24] 14  SpO2:  [97 %-100 %] 97 %  BP: (156-183)/(74-86) 167/79     Weight: 61.2 kg (135 lb)  Body mass index is 21.79 kg/m².     Physical Exam  Vitals and nursing note reviewed.   Constitutional:       General: She is sleeping. She is not in acute distress.     Appearance: Normal appearance. She is not ill-appearing.   HENT:      Head: Normocephalic and  atraumatic.   Eyes:      Extraocular Movements: Extraocular movements intact.      Conjunctiva/sclera: Conjunctivae normal.   Cardiovascular:      Rate and Rhythm: Normal rate and regular rhythm.      Pulses: Normal pulses.           Radial pulses are 2+ on the right side and 2+ on the left side.        Dorsalis pedis pulses are 2+ on the right side and 2+ on the left side.      Heart sounds: Normal heart sounds.   Pulmonary:      Effort: Pulmonary effort is normal. No tachypnea, bradypnea or respiratory distress.      Breath sounds: Normal breath sounds.   Abdominal:      General: Bowel sounds are normal. There is no distension.      Palpations: Abdomen is soft.      Tenderness: There is generalized abdominal tenderness.   Musculoskeletal:         General: Normal range of motion.      Cervical back: Normal range of motion. No rigidity or tenderness.      Right lower leg: No edema.      Left lower leg: No edema.   Skin:     General: Skin is warm and dry.      Capillary Refill: Capillary refill takes less than 2 seconds.   Neurological:      General: No focal deficit present.      Mental Status: She is oriented to person, place, and time. Mental status is at baseline.      GCS: GCS eye subscore is 3. GCS verbal subscore is 4. GCS motor subscore is 4.   Psychiatric:         Mood and Affect: Mood normal.                Significant Labs: All pertinent labs within the past 24 hours have been reviewed.  Recent Lab Results  (Last 5 results in the past 24 hours)        07/04/25  2330   07/04/25  2146   07/04/25  1303   07/04/25  1242   07/04/25  1241        Albumin   3.8     3.6         ALP   100     97         ALT   9     8         Anion Gap   13     12         AST   14     12         Baso #   0.02     0.03         Basophil %   0.3     0.5         BILIRUBIN TOTAL   0.3  Comment: For infants and newborns, interpretation of results should be based   on gestational age, weight and in agreement with clinical    observations.    Premature Infant recommended reference ranges:   0-24 hours:  <8.0 mg/dL   24-48 hours: <12.0 mg/dL   3-5 days:    <15.0 mg/dL   6-29 days:   <15.0 mg/dL     0.3  Comment: For infants and newborns, interpretation of results should be based   on gestational age, weight and in agreement with clinical   observations.    Premature Infant recommended reference ranges:   0-24 hours:  <8.0 mg/dL   24-48 hours: <12.0 mg/dL   3-5 days:    <15.0 mg/dL   6-29 days:   <15.0 mg/dL         BUN   9     10         Calcium   9.3     8.6         Chloride   104     107         CO2   20     20         Creatinine   0.6     0.5         eGFR   >60     >60         Eos #   0.00     0.00         Eos %   0.0     0.0         Glucose   104     107         Group & Rh         B POS       Hematocrit   32.3     32.1         Hemoglobin   9.8     9.7         Immature Grans (Abs)   0.04  Comment: Mild elevation in immature granulocytes is non specific and can be seen in a variety of conditions including stress response, acute inflammation, trauma and pregnancy. Correlation with other laboratory and clinical findings is essential.     0.03  Comment: Mild elevation in immature granulocytes is non specific and can be seen in a variety of conditions including stress response, acute inflammation, trauma and pregnancy. Correlation with other laboratory and clinical findings is essential.         Immature Granulocytes   0.6     0.5         INDIRECT KEMAL         NEG       Lipase       8         Lymph #   0.54     0.53         LYMPH %   8.2     8.8         Magnesium  1.9               MCH   24.1     24.0         MCHC   30.3     30.2         MCV   80     79         Mono #   0.28     0.14         Mono %   4.2     2.3         MPV   9.3     8.9         Neut #   5.7     5.3         Neut %   86.7     87.9         nRBC   0     0         OCCULT BLOOD STOOL     Negative           Platelet Count   453     431         Potassium   3.2     3.6          PROTEIN TOTAL   6.8     6.4         RBC   4.06     4.05         RDW   15.5     15.6         Sodium   137     139         Specimen Outdate         07/07/2025 23:59       Troponin I High Sensitivity 52.9  Comment: Critical result TNIHS 52.9 pg/mL called to and read back by Keiko Fontenot RN ED at 05-Jul-2025 00:37 [facility]  Result Rechecked  Troponin results differ between methods. Do not use   results between Troponin methods interchangeably.    Alkaline Phospatase levels above 400 U/L may   cause false positive results.    Access hsTnI should not be used for patients taking   Asfotase chelsea (Strensiq).   30.1  Comment: Troponin results differ between methods. Do not use   results between Troponin methods interchangeably.    Alkaline Phospatase levels above 400 U/L may   cause false positive results.    Access hsTnI should not be used for patients taking   Asfotase chelsea (Strensiq).             WBC   6.62     6.01                                Significant Imaging: I have reviewed all pertinent imaging results/findings within the past 24 hours.  I have reviewed and interpreted all pertinent imaging results/findings within the past 24 hours.  Assessment/Plan:     Assessment & Plan  Seizures concurrent with and due to substance withdrawal  Two witnessed seizures at home within 3 hours of each other.  Ictal tongue biting.  Family reports she has not taken seizure medication in years.  Family is unsure if previous seizures were due to opiate withdrawal.    - 2400 mg Keppra given in ED  - 500 mg Keppra b.i.d.  - Consult neurology  - Resume home opiate dose at decreased frequency  - Urine drug screen pending  - Seizure precautions  Colitis  Chronic.    - Current loose bowel movements reported, none during this hospitalization today  - Hemoccult negative  - Liver enzymes unremarkable  - Continue to monitor    Lab Results   Component Value Date    ALT 9 (L) 07/04/2025    AST 14 07/04/2025    ALKPHOS 100 07/04/2025     BILITOT 0.3 07/04/2025       Results for orders placed or performed during the hospital encounter of 07/04/25 (from the past 2160 hours)   CT Abdomen Pelvis With IV Contrast NO Oral Contrast    Narrative    EXAMINATION:  CT ABDOMEN PELVIS WITH IV CONTRAST    CLINICAL HISTORY:  LLQ abdominal pain;    TECHNIQUE:  Low dose axial images, sagittal and coronal reformations were obtained from the neck base to the pubic symphysis after the administration of 100 cc Omnipaque 350 intravenous contrast.  Oral contrast was not administered.    COMPARISON:  Comparison made with the patient's previous CT scan of the abdomen pelvis with contrast 04/23/2025.    FINDINGS:  Substantial anatomic distortion of the body cavities is noted as there is evidence fairly prominent complex alignment of the lumbar spine with apex of the scoliotic curvature centered over the L3 vertebral body with minimal lateral subluxation of L2 upon L3.  Chronic compression fractures are established at both the L2 and L3 vertebral body levels with evidence of moderate apical disturbance along the superior edge of the L4 vertebral body that appear chronic in time.  The overall bone mineral density structure appears markedly osteopenic.  Old healed fracture involving the right parasymphyseal symphysis pubis with evidence of osseous bridging.  Chronic degenerative changes of the right hip articulation.  Patient has undergone previous left hip arthroplasty causing significant band of beam hardening artifact.  Spherical low-density calcified nodule projects in the patient's right breast tissue likely reflecting area of benign fat necrosis.    Visualized lung bases demonstrates evidence of minimal pulmonary emphysematous changes, 4 mm nodule projecting the patient's right lower lobe peripherally (series 3, image number 16), and calcified nodule projecting in the left upper lobe anteriorly (series 3, image number 3.  Minimal left lower lobe parenchymal scarring.   Thoracic aorta is tortuous becoming midline at the diaphragmatic hiatus.  Mild circumferential thickening of the distal esophagus above the level of the gastroesophageal junction on the basis of longstanding reflux are vagina is a chronic primary or infectious esophagitis.    Liver appears normal in size and of low-density with scattered spherical low-density lesions throughout the right hepatic lobe that are too small to characterize by CT criteria but favor benign cyst, post cholecystectomy changes with prominence involving the intrahepatic ducts.  The spleen appears normal in size and overall density with a small splenule about the inner margin.  Stomach appears decompressed with moderate thickening of the gastric antrum pre-pyloric region likely secondary to underdistention, although an element of active inflammatory changes cannot be excluded.  Diffuse inflammatory stranding in the central mesentery is noted.  Adrenal glands appear normal.  Kidneys excrete contrast satisfactory appears small spherical low-density cyst within the anterior interpolar region of the right kidney.  There is a small 3 mm stone in the inferior pole left kidney.    Persistent thickening along the descending segment of the colon which is of active colitis the similar in appearance as compared to previous.  There is some involvement of the ascending colon.  Liquified gas and stool throughout the entire colonic lumen is noted.  No evidence of intestinal obstruction.  The vascular structures appear normal.    Pelvic structures show evidence of normal appearance of the urinary bladder.  Small amount of free fluid in the right side of the pelvic cavity.  Post hysterectomy pelvis is noted.      Impression    1.  No CT evidence of acute intra-abdominal findings.    2.  Diffuse low-density changes noted throughout the descending colon on the basis of active colitis that appears similar to that from the patient's most recent examination with no  evidence of detrimental change upon comparison.  Liquified gas and stool persist within the colonic lumen.    3.  Small amount of free fluid in the right side of the pelvic cavity.    4.  Prominence of all vein the intra biliary tracts in the setting of a resected gallbladder that is in expected findings patient of post cholecystectomy state.    5.  Several small spherical low-density cyst are established throughout the hepatic architecture some which appear too small to characterize by CT criteria but favor benign cyst.    6.  Diffuse thickening of the distal esophagus above the level the GE junction suggestive of primary esophagitis or sequela of chronic reflux esophagitis.      Electronically signed by: Kurtis Logan MD  Date:    07/04/2025  Time:    14:19         History of seizures  Noted.  Seizure etiology unknown.  Possibly also opiate withdrawal per family.     - See seizures concurrent with and due to substance withdrawal plan  Anemia  Anemia is likely due to chronic disease. Most recent hemoglobin and hematocrit are listed below.  Recent Labs     07/04/25  1242 07/04/25  2146   HGB 9.7* 9.8*   HCT 32.1* 32.3*     Plan  - Monitor serial CBC: Daily  - Transfuse PRBC if patient becomes hemodynamically unstable, symptomatic or H/H drops below 7/21.  - Patient has not received any PRBC transfusions to date  - Patient's anemia is currently stable  Opioid dependence with withdrawal  Decades of opiate use per family.  Currently prescribed 10/325 4 times daily.    - Family reports recent decrease in use with concurrent increase in marijuana use  - No opioids taken today per family  - Family reports unknown status of opioid use yesterday    VTE Risk Mitigation (From admission, onward)           Ordered     enoxaparin injection 40 mg  Daily         07/05/25 0046     IP VTE HIGH RISK PATIENT  Once         07/05/25 0046     Place sequential compression device  Until discontinued         07/05/25 0046                   Advance Care Planning     Code Status: Full Code  I engaged the the patient in a voluntary conversation about the patient's preferences for care at the very end of life. The patient wishes to have CPR and all other invasive treatments performed when her heart and/or breathing stops. I communicated to the patient that a full code will be placed in the chart.    A total of 17 min was spent on advance care planning, goals of care discussion, emotional support, formulating and communicating prognosis and exploring burden/benefit of various approaches of treatment. This discussion occurred on a fully voluntary basis with the verbal consent of the patient and/or family.         On 07/05/2025, patient should be placed in hospital observation services under my care in collaboration with Dr. Jaffe.           Julio Cesar Wiggins, [facility]  Department of Hospital Medicine  [facility]

## 2025-07-06 LAB
ABSOLUTE EOSINOPHIL (SMH): 0.15 K/UL
ABSOLUTE MONOCYTE (SMH): 0.46 K/UL (ref 0.3–1)
ABSOLUTE NEUTROPHIL COUNT (SMH): 5.4 K/UL (ref 1.8–7.7)
ALBUMIN SERPL-MCNC: 3.7 G/DL (ref 3.5–5.2)
ALP SERPL-CCNC: 96 UNIT/L (ref 55–135)
ALT SERPL-CCNC: 8 UNIT/L (ref 10–44)
ANION GAP (SMH): 8 MMOL/L (ref 8–16)
AST SERPL-CCNC: 13 UNIT/L (ref 10–40)
BASOPHILS # BLD AUTO: 0.03 K/UL
BASOPHILS NFR BLD AUTO: 0.4 %
BILIRUB SERPL-MCNC: 0.3 MG/DL (ref 0.1–1)
BUN SERPL-MCNC: 7 MG/DL (ref 8–23)
C DIFF GDH STL QL: NEGATIVE
C DIFF TOX A+B STL QL IA: NEGATIVE
CALCIUM SERPL-MCNC: 9.2 MG/DL (ref 8.7–10.5)
CHLORIDE SERPL-SCNC: 107 MMOL/L (ref 95–110)
CO2 SERPL-SCNC: 25 MMOL/L (ref 23–29)
CREAT SERPL-MCNC: 0.6 MG/DL (ref 0.5–1.4)
ERYTHROCYTE [DISTWIDTH] IN BLOOD BY AUTOMATED COUNT: 15.7 % (ref 11.5–14.5)
GFR SERPLBLD CREATININE-BSD FMLA CKD-EPI: >60 ML/MIN/1.73/M2
GLUCOSE SERPL-MCNC: 105 MG/DL (ref 70–110)
HCT VFR BLD AUTO: 31.6 % (ref 37–48.5)
HGB BLD-MCNC: 9.8 GM/DL (ref 12–16)
IMM GRANULOCYTES # BLD AUTO: 0.03 K/UL (ref 0–0.04)
IMM GRANULOCYTES NFR BLD AUTO: 0.4 % (ref 0–0.5)
LYMPHOCYTES # BLD AUTO: 2.2 K/UL (ref 1–4.8)
MAGNESIUM SERPL-MCNC: 2 MG/DL (ref 1.6–2.6)
MCH RBC QN AUTO: 24 PG (ref 27–31)
MCHC RBC AUTO-ENTMCNC: 31 G/DL (ref 32–36)
MCV RBC AUTO: 77 FL (ref 82–98)
NUCLEATED RBC (/100WBC) (SMH): 0 /100 WBC
OHS QRS DURATION: 76 MS
OHS QTC CALCULATION: 427 MS
PLATELET # BLD AUTO: 458 K/UL (ref 150–450)
PMV BLD AUTO: 9 FL (ref 9.2–12.9)
POTASSIUM SERPL-SCNC: 3.5 MMOL/L (ref 3.5–5.1)
PROT SERPL-MCNC: 6.6 GM/DL (ref 6–8.4)
RBC # BLD AUTO: 4.09 M/UL (ref 4–5.4)
RELATIVE EOSINOPHIL (SMH): 1.8 % (ref 0–8)
RELATIVE LYMPHOCYTE (SMH): 26.6 % (ref 18–48)
RELATIVE MONOCYTE (SMH): 5.6 % (ref 4–15)
RELATIVE NEUTROPHIL (SMH): 65.2 % (ref 38–73)
SODIUM SERPL-SCNC: 140 MMOL/L (ref 136–145)
WBC # BLD AUTO: 8.27 K/UL (ref 3.9–12.7)

## 2025-07-06 PROCEDURE — 25000003 PHARM REV CODE 250

## 2025-07-06 PROCEDURE — 87209 SMEAR COMPLEX STAIN: CPT | Performed by: HOSPITALIST

## 2025-07-06 PROCEDURE — 85025 COMPLETE CBC W/AUTO DIFF WBC: CPT

## 2025-07-06 PROCEDURE — 87449 NOS EACH ORGANISM AG IA: CPT | Performed by: HOSPITALIST

## 2025-07-06 PROCEDURE — 87177 OVA AND PARASITES SMEARS: CPT | Performed by: INTERNAL MEDICINE

## 2025-07-06 PROCEDURE — 80053 COMPREHEN METABOLIC PANEL: CPT

## 2025-07-06 PROCEDURE — 83735 ASSAY OF MAGNESIUM: CPT

## 2025-07-06 PROCEDURE — 36415 COLL VENOUS BLD VENIPUNCTURE: CPT

## 2025-07-06 PROCEDURE — 11000001 HC ACUTE MED/SURG PRIVATE ROOM

## 2025-07-06 PROCEDURE — 87046 STOOL CULTR AEROBIC BACT EA: CPT | Performed by: HOSPITALIST

## 2025-07-06 PROCEDURE — 63600175 PHARM REV CODE 636 W HCPCS

## 2025-07-06 PROCEDURE — 27000207 HC ISOLATION

## 2025-07-06 RX ADMIN — DOCUSATE SODIUM 100 MG: 100 CAPSULE, LIQUID FILLED ORAL at 09:07

## 2025-07-06 RX ADMIN — LEVETIRACETAM 500 MG: 500 TABLET, FILM COATED ORAL at 09:07

## 2025-07-06 RX ADMIN — Medication 6 MG: at 11:07

## 2025-07-06 RX ADMIN — PREGABALIN 200 MG: 75 CAPSULE ORAL at 08:07

## 2025-07-06 RX ADMIN — OXYCODONE HYDROCHLORIDE AND ACETAMINOPHEN 1 TABLET: 10; 325 TABLET ORAL at 04:07

## 2025-07-06 RX ADMIN — AMLODIPINE BESYLATE 5 MG: 5 TABLET ORAL at 09:07

## 2025-07-06 RX ADMIN — PREGABALIN 200 MG: 75 CAPSULE ORAL at 09:07

## 2025-07-06 RX ADMIN — PREGABALIN 200 MG: 75 CAPSULE ORAL at 03:07

## 2025-07-06 RX ADMIN — OXYCODONE HYDROCHLORIDE AND ACETAMINOPHEN 1 TABLET: 10; 325 TABLET ORAL at 11:07

## 2025-07-06 RX ADMIN — LEVETIRACETAM 500 MG: 500 TABLET, FILM COATED ORAL at 08:07

## 2025-07-06 RX ADMIN — ENOXAPARIN SODIUM 40 MG: 40 INJECTION SUBCUTANEOUS at 04:07

## 2025-07-06 RX ADMIN — LISINOPRIL 20 MG: 20 TABLET ORAL at 09:07

## 2025-07-06 NOTE — ASSESSMENT & PLAN NOTE
Chronic.    - Current loose bowel movements reported, none during this hospitalization today  - Hemoccult negative  Close monitor     Lab Results   Component Value Date    ALT 8 (L) 07/06/2025    AST 13 07/06/2025    ALKPHOS 96 07/06/2025    BILITOT 0.3 07/06/2025       Results for orders placed or performed during the hospital encounter of 07/04/25 (from the past 2160 hours)   CT Abdomen Pelvis With IV Contrast NO Oral Contrast    Narrative    EXAMINATION:  CT ABDOMEN PELVIS WITH IV CONTRAST    CLINICAL HISTORY:  LLQ abdominal pain;    TECHNIQUE:  Low dose axial images, sagittal and coronal reformations were obtained from the neck base to the pubic symphysis after the administration of 100 cc Omnipaque 350 intravenous contrast.  Oral contrast was not administered.    COMPARISON:  Comparison made with the patient's previous CT scan of the abdomen pelvis with contrast 04/23/2025.    FINDINGS:  Substantial anatomic distortion of the body cavities is noted as there is evidence fairly prominent complex alignment of the lumbar spine with apex of the scoliotic curvature centered over the L3 vertebral body with minimal lateral subluxation of L2 upon L3.  Chronic compression fractures are established at both the L2 and L3 vertebral body levels with evidence of moderate apical disturbance along the superior edge of the L4 vertebral body that appear chronic in time.  The overall bone mineral density structure appears markedly osteopenic.  Old healed fracture involving the right parasymphyseal symphysis pubis with evidence of osseous bridging.  Chronic degenerative changes of the right hip articulation.  Patient has undergone previous left hip arthroplasty causing significant band of beam hardening artifact.  Spherical low-density calcified nodule projects in the patient's right breast tissue likely reflecting area of benign fat necrosis.    Visualized lung bases demonstrates evidence of minimal pulmonary emphysematous  changes, 4 mm nodule projecting the patient's right lower lobe peripherally (series 3, image number 16), and calcified nodule projecting in the left upper lobe anteriorly (series 3, image number 3.  Minimal left lower lobe parenchymal scarring.  Thoracic aorta is tortuous becoming midline at the diaphragmatic hiatus.  Mild circumferential thickening of the distal esophagus above the level of the gastroesophageal junction on the basis of longstanding reflux are vagina is a chronic primary or infectious esophagitis.    Liver appears normal in size and of low-density with scattered spherical low-density lesions throughout the right hepatic lobe that are too small to characterize by CT criteria but favor benign cyst, post cholecystectomy changes with prominence involving the intrahepatic ducts.  The spleen appears normal in size and overall density with a small splenule about the inner margin.  Stomach appears decompressed with moderate thickening of the gastric antrum pre-pyloric region likely secondary to underdistention, although an element of active inflammatory changes cannot be excluded.  Diffuse inflammatory stranding in the central mesentery is noted.  Adrenal glands appear normal.  Kidneys excrete contrast satisfactory appears small spherical low-density cyst within the anterior interpolar region of the right kidney.  There is a small 3 mm stone in the inferior pole left kidney.    Persistent thickening along the descending segment of the colon which is of active colitis the similar in appearance as compared to previous.  There is some involvement of the ascending colon.  Liquified gas and stool throughout the entire colonic lumen is noted.  No evidence of intestinal obstruction.  The vascular structures appear normal.    Pelvic structures show evidence of normal appearance of the urinary bladder.  Small amount of free fluid in the right side of the pelvic cavity.  Post hysterectomy pelvis is noted.       Impression    1.  No CT evidence of acute intra-abdominal findings.    2.  Diffuse low-density changes noted throughout the descending colon on the basis of active colitis that appears similar to that from the patient's most recent examination with no evidence of detrimental change upon comparison.  Liquified gas and stool persist within the colonic lumen.    3.  Small amount of free fluid in the right side of the pelvic cavity.    4.  Prominence of all vein the intra biliary tracts in the setting of a resected gallbladder that is in expected findings patient of post cholecystectomy state.    5.  Several small spherical low-density cyst are established throughout the hepatic architecture some which appear too small to characterize by CT criteria but favor benign cyst.    6.  Diffuse thickening of the distal esophagus above the level the GE junction suggestive of primary esophagitis or sequela of chronic reflux esophagitis.      Electronically signed by: Kurtis Logan MD  Date:    07/04/2025  Time:    14:19

## 2025-07-06 NOTE — ASSESSMENT & PLAN NOTE
Reviewed by the Neurology and suspect opiate withdrawal seizures    - 500 mg Keppra b.i.d.  - Resume home opiate dose at decreased frequency  - Urine drug screen noted  - Seizure precautions

## 2025-07-06 NOTE — ASSESSMENT & PLAN NOTE
Anemia is likely due to chronic disease. Most recent hemoglobin and hematocrit are listed below.  Recent Labs     07/04/25  2146 07/05/25  0335 07/06/25  0454   HGB 9.8* 9.8* 9.8*   HCT 32.3* 32.4* 31.6*     Plan  - Monitor serial CBC: Daily  - Transfuse PRBC if patient becomes hemodynamically unstable, symptomatic or H/H drops below 7/21.  - Patient has not received any PRBC transfusions to date  - Patient's anemia is currently stable

## 2025-07-06 NOTE — PLAN OF CARE
[facility]  Initial Discharge Assessment       Primary Care Provider: Alphonse Boothe III, MD    Admission Diagnosis: Seizures concurrent with and due to substance withdrawal [F19.939]  Seizure [R56.9]    Admission Date: 7/4/2025  Expected Discharge Date:     CM met with patient bedside. spouse was present in the room assisting with assessment. CM verified demographics, insurance, supports, and PCP.  CM assessed patient's needs. Patient is able to complete ADLs independently. Patient verified at home DME: Wheelchair, Bedside Commode, Rolling Walker.  Patient verified No HH, No Dialysis, No Blood Thinners, and No Oxygen. No needs identified.     Patient verified pharmacy of choice: [facility]  Patient confirmed spouse will be source of transportation at the time of discharge.       Transition of Care Barriers: None    Payor: MEDICARE / Plan: MEDICARE PART A & B / Product Type: Government /     Extended Emergency Contact Information  Primary Emergency Contact: Otoniel Ray  Address: [address] [place] [address] [id number] [place] [address]  Home Phone: [phone]  Mobile Phone: [phone]  Relation: Spouse  Secondary Emergency Contact: SAHRA MCGUIRE  Mobile Phone: [phone]  Relation: Son    Discharge Plan A: Home with family  Discharge Plan B: Home with family      [id number] [place] [facility] [address]  [address]  [facility] [address]  Phone: [phone] Fax: [phone]    [id number] [place] - [address]  [address]  [facility] [address]  Phone: [phone] Fax: [phone]      Initial Assessment (most recent)       Adult Discharge Assessment - 07/06/25 1344          Discharge Assessment    Assessment Type Discharge Planning Assessment     Confirmed/corrected address, phone number and insurance Yes     Confirmed Demographics Correct on Facesheet     Source of Information patient;family     People in Home spouse     Name(s) of  People in Home Otoniel     Do you expect to return to your current living situation? Yes     Do you have help at home or someone to help you manage your care at home? Yes     Who are your caregiver(s) and their phone number(s)? Eusebia Frederick [phone]     Prior to hospitilization cognitive status: Alert/Oriented     Current cognitive status: Alert/Oriented     Walking or Climbing Stairs Difficulty yes     Walking or Climbing Stairs stair climbing difficulty, requires equipment     Dressing/Bathing Difficulty no     Home Accessibility wheelchair accessible     Home Layout Able to live on 1st floor     Equipment Currently Used at Home walker, rolling;wheelchair;bedside commode     Readmission within 30 days? No     Patient currently being followed by outpatient case management? No     Do you currently have service(s) that help you manage your care at home? No     Do you take prescription medications? Yes     Do you have prescription coverage? Yes     Do you have any problems affording any of your prescribed medications? No     Is the patient taking medications as prescribed? yes     Who is going to help you get home at discharge? Spouse     How do you get to doctors appointments? family or friend will provide     Are you on dialysis? No     Do you take coumadin? No     Discharge Plan A Home with family     Discharge Plan B Home with family     DME Needed Upon Discharge  none     Discharge Plan discussed with: Patient     Transition of Care Barriers None                                 Statement Selected

## 2025-07-06 NOTE — HOSPITAL COURSE
79-year-old female with a past medical history of anemia, peptic ulcer disease, hypertension,  take colitis, colitis, chronic opioid dependence, and seizures was admitted with seizure activity 2 times noticed by the son.   Original complaints or multiple days abdominal pain that were described as moderate to severe with nausea vomiting accompanied by frequent loose dark stools as well.  Family reports that she has been reducing her opioid medications and increasing marijuana use.  positive for ictal tongue biting.  Patient was reviewed by tele neurology and suspect opiate withdrawal seizure but unable to exclude her seizure recurrence since patient was not on seizure medications for a long time.  EEG was not reported at the time of the discharge but no more seizure activity during the hospital stay.   CT of the abdomen pelvis was done with no acute abdomen and previous possible colitis findings are unchanged and later ate well and no more complaint .  Her blood pressure is higher side and increased amlodipine dose and better controlled.  Overall patient is getting better, no more AMS and eating well and blood pressure better controlled and discharged in stable condition with Keppra to continue and follow up with the Neurology as outpatient.

## 2025-07-06 NOTE — ASSESSMENT & PLAN NOTE
Noted.  Seizure etiology unknown.  Possibly also opiate withdrawal as per tele neurology .   EEG  Continue Keppra

## 2025-07-06 NOTE — SUBJECTIVE & OBJECTIVE
Interval History:     Patient was seen and examined.  She is not coherent.  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  She reported that her son noticed she has seizures  Abdominal exam is with mild tenderness    Review of Systems  Objective:     Vital Signs (Most Recent):  Temp: 98.2 °F (36.8 °C) (07/06/25 1118)  Pulse: 68 (07/06/25 1118)  Resp: 19 (07/06/25 1118)  BP: (!) 150/66 (07/06/25 1118)  SpO2: 97 % (07/06/25 1118) Vital Signs (24h Range):  Temp:  [97.6 °F (36.4 °C)-98.6 °F (37 °C)] 98.2 °F (36.8 °C)  Pulse:  [64-99] 68  Resp:  [18-19] 19  SpO2:  [95 %-98 %] 97 %  BP: (139-181)/(66-92) 150/66     Weight: 63.6 kg (140 lb 3.4 oz)  Body mass index is 22.63 kg/m².    Intake/Output Summary (Last 24 hours) at 7/6/2025 1304  Last data filed at 7/5/2025 2206  Gross per 24 hour   Intake 240 ml   Output --   Net 240 ml         Physical Exam  Vitals and nursing note reviewed.   HENT:      Head: Normocephalic and atraumatic.   Eyes:      Conjunctiva/sclera: Conjunctivae normal.   Neck:      Vascular: No JVD.   Cardiovascular:      Rate and Rhythm: Normal rate.      Heart sounds: Normal heart sounds.   Pulmonary:      Effort: Pulmonary effort is normal.   Abdominal:      Palpations: Abdomen is soft.   Musculoskeletal:         General: Normal range of motion.   Skin:     General: Skin is warm.   Neurological:      Mental Status: She is alert. She is disoriented.               Significant Labs: All pertinent labs within the past 24 hours have been reviewed.  CBC:   Recent Labs   Lab 07/04/25 2146 07/05/25 0335 07/06/25  0454   WBC 6.62 7.04 8.27   HGB 9.8* 9.8* 9.8*   HCT 32.3* 32.4* 31.6*   * 416 458*     CMP:   Recent Labs   Lab 07/04/25 2146 07/05/25 0335 07/06/25  0454    138 140   K 3.2* 3.0* 3.5    106 107   CO2 20* 22* 25    104 105   BUN 9 8 7*   CREATININE 0.6 0.6 0.6   CALCIUM 9.3 8.9 9.2   PROT 6.8 6.2 6.6   ALBUMIN 3.8 3.5 3.7   BILITOT 0.3 0.3 0.3   ALKPHOS 100 91 96   AST  "14 11 13   ALT 9* 6* 8*   ANIONGAP 13 10 8     Cardiac Markers: No results for input(s): "CKMB", "MYOGLOBIN", "BNP", "TROPISTAT" in the last 48 hours.    Significant Imaging: I have reviewed all pertinent imaging results/findings within the past 24 hours.  "

## 2025-07-06 NOTE — PROGRESS NOTES
[facility] Medicine  Progress Note    Patient Name: Froilan Ray  MRN: [id number]  Patient Class: IP- Inpatient   Admission Date: 7/4/2025  Length of Stay: 1 days  Attending Physician: Pierre Floyd MD  Primary Care Provider: Alphonse Boothe III, MD        Subjective     Principal Problem:History of seizures        HPI:  Froilan Duran is a 79-year-old female with a past medical history of anemia, peptic ulcer disease, hypertension,  take colitis, colitis, chronic opioid dependence, and seizures who presents to the ED for the 2nd time today.  Original complaints or multiple days abdominal pain that were described as moderate to severe with nausea vomiting accompanied by frequent loose dark stools.  Hemoccult was negative.  Family reports she was also diaphoretic last night.  After patient was discharged after negative findings this morning, patient had 2 episodes of seizure activity approximately 3 hours apart.  EMS reports postictal state on arrival.  Family reports that she has been reducing her opioid medications and increasing marijuana use.  It is believed by family she may have not been taking her medication last night, and they know she did not take it today due to being in the hospital for the abdominal pain earlier today.  ED workup was positive for ictal tongue biting, mildly elevated troponin of 30.1 and repeat of 52.9, 2nd troponin pending, hypokalemia at 3.2, and anemia with H&H of 9.8/32.3.  2400 mg of Keppra given in ED. hospital Medicine was consulted, and patient and family are agreeable to hospital admission for close monitoring, medical management, and further workup.    Overview/Hospital Course:   79-year-old female with a past medical history of anemia, peptic ulcer disease, hypertension,  take colitis, colitis, chronic opioid dependence, and seizures was admitted with seizure activity 2 times noticed by the son.   Original complaints or multiple days  abdominal pain that were described as moderate to severe with nausea vomiting accompanied by frequent loose dark stools as well.  Family reports that she has been reducing her opioid medications and increasing marijuana use.  positive for ictal tongue biting.  Patient was reviewed by tele neurology and suspect opiate withdrawal seizure since patient was not on seizure medications for a long time.  EEG will be done.  CT of the abdomen pelvis was done with no acute abdomen and previous possible colitis findings are unchanged    Interval History:     Patient was seen and examined.  She is not coherent.  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  She reported that her son noticed she has seizures  Abdominal exam is with mild tenderness    Review of Systems  Objective:     Vital Signs (Most Recent):  Temp: 98.2 °F (36.8 °C) (07/06/25 1118)  Pulse: 68 (07/06/25 1118)  Resp: 19 (07/06/25 1118)  BP: (!) 150/66 (07/06/25 1118)  SpO2: 97 % (07/06/25 1118) Vital Signs (24h Range):  Temp:  [97.6 °F (36.4 °C)-98.6 °F (37 °C)] 98.2 °F (36.8 °C)  Pulse:  [64-99] 68  Resp:  [18-19] 19  SpO2:  [95 %-98 %] 97 %  BP: (139-181)/(66-92) 150/66     Weight: 63.6 kg (140 lb 3.4 oz)  Body mass index is 22.63 kg/m².    Intake/Output Summary (Last 24 hours) at 7/6/2025 1304  Last data filed at 7/5/2025 2206  Gross per 24 hour   Intake 240 ml   Output --   Net 240 ml         Physical Exam  Vitals and nursing note reviewed.   HENT:      Head: Normocephalic and atraumatic.   Eyes:      Conjunctiva/sclera: Conjunctivae normal.   Neck:      Vascular: No JVD.   Cardiovascular:      Rate and Rhythm: Normal rate.      Heart sounds: Normal heart sounds.   Pulmonary:      Effort: Pulmonary effort is normal.   Abdominal:      Palpations: Abdomen is soft.   Musculoskeletal:         General: Normal range of motion.   Skin:     General: Skin is warm.   Neurological:      Mental Status: She is alert. She is disoriented.               Significant  "Labs: All pertinent labs within the past 24 hours have been reviewed.  CBC:   Recent Labs   Lab 07/04/25 2146 07/05/25  0335 07/06/25  0454   WBC 6.62 7.04 8.27   HGB 9.8* 9.8* 9.8*   HCT 32.3* 32.4* 31.6*   * 416 458*     CMP:   Recent Labs   Lab 07/04/25 2146 07/05/25 0335 07/06/25  0454    138 140   K 3.2* 3.0* 3.5    106 107   CO2 20* 22* 25    104 105   BUN 9 8 7*   CREATININE 0.6 0.6 0.6   CALCIUM 9.3 8.9 9.2   PROT 6.8 6.2 6.6   ALBUMIN 3.8 3.5 3.7   BILITOT 0.3 0.3 0.3   ALKPHOS 100 91 96   AST 14 11 13   ALT 9* 6* 8*   ANIONGAP 13 10 8     Cardiac Markers: No results for input(s): "CKMB", "MYOGLOBIN", "BNP", "TROPISTAT" in the last 48 hours.    Significant Imaging: I have reviewed all pertinent imaging results/findings within the past 24 hours.      Assessment & Plan  Seizures concurrent with and due to substance withdrawal  Reviewed by the Neurology and suspect opiate withdrawal seizures    - 500 mg Keppra b.i.d.  - Resume home opiate dose at decreased frequency  - Urine drug screen noted  - Seizure precautions  Colitis  Chronic.    - Current loose bowel movements reported, none during this hospitalization today  - Hemoccult negative  Close monitor     Lab Results   Component Value Date    ALT 8 (L) 07/06/2025    AST 13 07/06/2025    ALKPHOS 96 07/06/2025    BILITOT 0.3 07/06/2025       Results for orders placed or performed during the hospital encounter of 07/04/25 (from the past 2160 hours)   CT Abdomen Pelvis With IV Contrast NO Oral Contrast    Narrative    EXAMINATION:  CT ABDOMEN PELVIS WITH IV CONTRAST    CLINICAL HISTORY:  LLQ abdominal pain;    TECHNIQUE:  Low dose axial images, sagittal and coronal reformations were obtained from the neck base to the pubic symphysis after the administration of 100 cc Omnipaque 350 intravenous contrast.  Oral contrast was not administered.    COMPARISON:  Comparison made with the patient's previous CT scan of the abdomen pelvis with " contrast 04/23/2025.    FINDINGS:  Substantial anatomic distortion of the body cavities is noted as there is evidence fairly prominent complex alignment of the lumbar spine with apex of the scoliotic curvature centered over the L3 vertebral body with minimal lateral subluxation of L2 upon L3.  Chronic compression fractures are established at both the L2 and L3 vertebral body levels with evidence of moderate apical disturbance along the superior edge of the L4 vertebral body that appear chronic in time.  The overall bone mineral density structure appears markedly osteopenic.  Old healed fracture involving the right parasymphyseal symphysis pubis with evidence of osseous bridging.  Chronic degenerative changes of the right hip articulation.  Patient has undergone previous left hip arthroplasty causing significant band of beam hardening artifact.  Spherical low-density calcified nodule projects in the patient's right breast tissue likely reflecting area of benign fat necrosis.    Visualized lung bases demonstrates evidence of minimal pulmonary emphysematous changes, 4 mm nodule projecting the patient's right lower lobe peripherally (series 3, image number 16), and calcified nodule projecting in the left upper lobe anteriorly (series 3, image number 3.  Minimal left lower lobe parenchymal scarring.  Thoracic aorta is tortuous becoming midline at the diaphragmatic hiatus.  Mild circumferential thickening of the distal esophagus above the level of the gastroesophageal junction on the basis of longstanding reflux are vagina is a chronic primary or infectious esophagitis.    Liver appears normal in size and of low-density with scattered spherical low-density lesions throughout the right hepatic lobe that are too small to characterize by CT criteria but favor benign cyst, post cholecystectomy changes with prominence involving the intrahepatic ducts.  The spleen appears normal in size and overall density with a small splenule  about the inner margin.  Stomach appears decompressed with moderate thickening of the gastric antrum pre-pyloric region likely secondary to underdistention, although an element of active inflammatory changes cannot be excluded.  Diffuse inflammatory stranding in the central mesentery is noted.  Adrenal glands appear normal.  Kidneys excrete contrast satisfactory appears small spherical low-density cyst within the anterior interpolar region of the right kidney.  There is a small 3 mm stone in the inferior pole left kidney.    Persistent thickening along the descending segment of the colon which is of active colitis the similar in appearance as compared to previous.  There is some involvement of the ascending colon.  Liquified gas and stool throughout the entire colonic lumen is noted.  No evidence of intestinal obstruction.  The vascular structures appear normal.    Pelvic structures show evidence of normal appearance of the urinary bladder.  Small amount of free fluid in the right side of the pelvic cavity.  Post hysterectomy pelvis is noted.      Impression    1.  No CT evidence of acute intra-abdominal findings.    2.  Diffuse low-density changes noted throughout the descending colon on the basis of active colitis that appears similar to that from the patient's most recent examination with no evidence of detrimental change upon comparison.  Liquified gas and stool persist within the colonic lumen.    3.  Small amount of free fluid in the right side of the pelvic cavity.    4.  Prominence of all vein the intra biliary tracts in the setting of a resected gallbladder that is in expected findings patient of post cholecystectomy state.    5.  Several small spherical low-density cyst are established throughout the hepatic architecture some which appear too small to characterize by CT criteria but favor benign cyst.    6.  Diffuse thickening of the distal esophagus above the level the GE junction suggestive of primary  esophagitis or sequela of chronic reflux esophagitis.      Electronically signed by: Kurtis Logan MD  Date:    07/04/2025  Time:    14:19       History of seizures  Noted.  Seizure etiology unknown.  Possibly also opiate withdrawal as per tele neurology .   EEG  Continue Keppra  Anemia  Anemia is likely due to chronic disease. Most recent hemoglobin and hematocrit are listed below.  Recent Labs     07/04/25  2146 07/05/25  0335 07/06/25  0454   HGB 9.8* 9.8* 9.8*   HCT 32.3* 32.4* 31.6*     Plan  - Monitor serial CBC: Daily  - Transfuse PRBC if patient becomes hemodynamically unstable, symptomatic or H/H drops below 7/21.  - Patient has not received any PRBC transfusions to date  - Patient's anemia is currently stable  Opioid dependence with withdrawal  Decades of opiate use per family.  Currently prescribed 10/325 4 times daily.    - Family reports recent decrease in use with concurrent increase in marijuana use  Possible withdrawal seizure      VTE Risk Mitigation (From admission, onward)           Ordered     enoxaparin injection 40 mg  Daily         07/05/25 0046     IP VTE HIGH RISK PATIENT  Once         07/05/25 0046     Place sequential compression device  Until discontinued         07/05/25 0046                    Discharge Planning   BAILEY:      Code Status: Full Code   Medical Readiness for Discharge Date:                            Pierre Floyd MD  Department of Hospital Medicine   [facility]

## 2025-07-06 NOTE — PLAN OF CARE
Problem: Adult Inpatient Plan of Care  Goal: Plan of Care Review  Outcome: Progressing  Goal: Patient-Specific Goal (Individualized)  Outcome: Progressing  Goal: Absence of Hospital-Acquired Illness or Injury  Outcome: Progressing  Goal: Optimal Comfort and Wellbeing  Outcome: Progressing  Goal: Readiness for Transition of Care  Outcome: Progressing     Problem: Acute Kidney Injury/Impairment  Goal: Fluid and Electrolyte Balance  Outcome: Progressing  Goal: Improved Oral Intake  Outcome: Progressing  Goal: Effective Renal Function  Outcome: Progressing     Problem: Fall Injury Risk  Goal: Absence of Fall and Fall-Related Injury  Outcome: Progressing     Problem: Skin Injury Risk Increased  Goal: Skin Health and Integrity  Outcome: Progressing     Problem: Pain Acute  Goal: Optimal Pain Control and Function  Outcome: Progressing     Problem: Infection  Goal: Absence of Infection Signs and Symptoms  Outcome: Progressing

## 2025-07-06 NOTE — ASSESSMENT & PLAN NOTE
Decades of opiate use per family.  Currently prescribed 10/325 4 times daily.    - Family reports recent decrease in use with concurrent increase in marijuana use  Possible withdrawal seizure

## 2025-07-07 ENCOUNTER — PATIENT OUTREACH (OUTPATIENT)
Dept: ADMINISTRATIVE | Facility: HOSPITAL | Age: 80
End: 2025-07-07
Payer: MEDICARE

## 2025-07-07 LAB
ABSOLUTE EOSINOPHIL (SMH): 0.14 K/UL
ABSOLUTE MONOCYTE (SMH): 0.54 K/UL (ref 0.3–1)
ABSOLUTE NEUTROPHIL COUNT (SMH): 5 K/UL (ref 1.8–7.7)
ALBUMIN SERPL-MCNC: 3.5 G/DL (ref 3.5–5.2)
ALP SERPL-CCNC: 89 UNIT/L (ref 55–135)
ALT SERPL-CCNC: 6 UNIT/L (ref 10–44)
ANION GAP (SMH): 7 MMOL/L (ref 8–16)
ANION GAP (SMH): 9 MMOL/L (ref 8–16)
AST SERPL-CCNC: 14 UNIT/L (ref 10–40)
BASOPHILS # BLD AUTO: 0.03 K/UL
BASOPHILS NFR BLD AUTO: 0.4 %
BILIRUB SERPL-MCNC: 0.3 MG/DL (ref 0.1–1)
BUN SERPL-MCNC: 5 MG/DL (ref 8–23)
BUN SERPL-MCNC: 5 MG/DL (ref 8–23)
CALCIUM SERPL-MCNC: 8.4 MG/DL (ref 8.7–10.5)
CALCIUM SERPL-MCNC: 8.7 MG/DL (ref 8.7–10.5)
CHLORIDE SERPL-SCNC: 108 MMOL/L (ref 95–110)
CHLORIDE SERPL-SCNC: 109 MMOL/L (ref 95–110)
CO2 SERPL-SCNC: 25 MMOL/L (ref 23–29)
CO2 SERPL-SCNC: 25 MMOL/L (ref 23–29)
CREAT SERPL-MCNC: 0.5 MG/DL (ref 0.5–1.4)
CREAT SERPL-MCNC: 0.5 MG/DL (ref 0.5–1.4)
ERYTHROCYTE [DISTWIDTH] IN BLOOD BY AUTOMATED COUNT: 15.7 % (ref 11.5–14.5)
GFR SERPLBLD CREATININE-BSD FMLA CKD-EPI: >60 ML/MIN/1.73/M2
GFR SERPLBLD CREATININE-BSD FMLA CKD-EPI: >60 ML/MIN/1.73/M2
GLUCOSE SERPL-MCNC: 113 MG/DL (ref 70–110)
GLUCOSE SERPL-MCNC: 94 MG/DL (ref 70–110)
HCT VFR BLD AUTO: 32.1 % (ref 37–48.5)
HGB BLD-MCNC: 9.6 GM/DL (ref 12–16)
IMM GRANULOCYTES # BLD AUTO: 0.03 K/UL (ref 0–0.04)
IMM GRANULOCYTES NFR BLD AUTO: 0.4 % (ref 0–0.5)
LYMPHOCYTES # BLD AUTO: 1.82 K/UL (ref 1–4.8)
MAGNESIUM SERPL-MCNC: 1.8 MG/DL (ref 1.6–2.6)
MCH RBC QN AUTO: 24.3 PG (ref 27–31)
MCHC RBC AUTO-ENTMCNC: 29.9 G/DL (ref 32–36)
MCV RBC AUTO: 81 FL (ref 82–98)
NUCLEATED RBC (/100WBC) (SMH): 0 /100 WBC
OHS QRS DURATION: 82 MS
OHS QTC CALCULATION: 444 MS
PLATELET # BLD AUTO: 367 K/UL (ref 150–450)
PMV BLD AUTO: 9.6 FL (ref 9.2–12.9)
POTASSIUM SERPL-SCNC: 2.7 MMOL/L (ref 3.5–5.1)
POTASSIUM SERPL-SCNC: 3.5 MMOL/L (ref 3.5–5.1)
PROT SERPL-MCNC: 6 GM/DL (ref 6–8.4)
RBC # BLD AUTO: 3.95 M/UL (ref 4–5.4)
RELATIVE EOSINOPHIL (SMH): 1.8 % (ref 0–8)
RELATIVE LYMPHOCYTE (SMH): 24 % (ref 18–48)
RELATIVE MONOCYTE (SMH): 7.1 % (ref 4–15)
RELATIVE NEUTROPHIL (SMH): 66.3 % (ref 38–73)
SODIUM SERPL-SCNC: 140 MMOL/L (ref 136–145)
SODIUM SERPL-SCNC: 143 MMOL/L (ref 136–145)
WBC # BLD AUTO: 7.58 K/UL (ref 3.9–12.7)

## 2025-07-07 PROCEDURE — 82040 ASSAY OF SERUM ALBUMIN: CPT

## 2025-07-07 PROCEDURE — 85025 COMPLETE CBC W/AUTO DIFF WBC: CPT

## 2025-07-07 PROCEDURE — 25000003 PHARM REV CODE 250: Performed by: INTERNAL MEDICINE

## 2025-07-07 PROCEDURE — 63600175 PHARM REV CODE 636 W HCPCS: Performed by: INTERNAL MEDICINE

## 2025-07-07 PROCEDURE — 36415 COLL VENOUS BLD VENIPUNCTURE: CPT

## 2025-07-07 PROCEDURE — 83735 ASSAY OF MAGNESIUM: CPT

## 2025-07-07 PROCEDURE — 36415 COLL VENOUS BLD VENIPUNCTURE: CPT | Performed by: INTERNAL MEDICINE

## 2025-07-07 PROCEDURE — 11000001 HC ACUTE MED/SURG PRIVATE ROOM

## 2025-07-07 PROCEDURE — 25000003 PHARM REV CODE 250

## 2025-07-07 PROCEDURE — 63600175 PHARM REV CODE 636 W HCPCS

## 2025-07-07 RX ORDER — POTASSIUM CHLORIDE 7.45 MG/ML
10 INJECTION INTRAVENOUS
Status: COMPLETED | OUTPATIENT
Start: 2025-07-07 | End: 2025-07-07

## 2025-07-07 RX ORDER — AMLODIPINE BESYLATE 5 MG/1
10 TABLET ORAL DAILY
Status: DISCONTINUED | OUTPATIENT
Start: 2025-07-07 | End: 2025-07-08 | Stop reason: HOSPADM

## 2025-07-07 RX ORDER — POTASSIUM CHLORIDE 20 MEQ/1
40 TABLET, EXTENDED RELEASE ORAL ONCE
Status: COMPLETED | OUTPATIENT
Start: 2025-07-07 | End: 2025-07-07

## 2025-07-07 RX ADMIN — AMLODIPINE BESYLATE 10 MG: 5 TABLET ORAL at 08:07

## 2025-07-07 RX ADMIN — LISINOPRIL 20 MG: 20 TABLET ORAL at 08:07

## 2025-07-07 RX ADMIN — LEVETIRACETAM 500 MG: 500 TABLET, FILM COATED ORAL at 08:07

## 2025-07-07 RX ADMIN — POTASSIUM CHLORIDE 40 MEQ: 1500 TABLET, EXTENDED RELEASE ORAL at 08:07

## 2025-07-07 RX ADMIN — PREGABALIN 200 MG: 75 CAPSULE ORAL at 03:07

## 2025-07-07 RX ADMIN — ENOXAPARIN SODIUM 40 MG: 40 INJECTION SUBCUTANEOUS at 05:07

## 2025-07-07 RX ADMIN — POTASSIUM CHLORIDE 10 MEQ: 7.46 INJECTION, SOLUTION INTRAVENOUS at 08:07

## 2025-07-07 RX ADMIN — POTASSIUM CHLORIDE 10 MEQ: 7.46 INJECTION, SOLUTION INTRAVENOUS at 09:07

## 2025-07-07 RX ADMIN — Medication 6 MG: at 09:07

## 2025-07-07 RX ADMIN — OXYCODONE HYDROCHLORIDE AND ACETAMINOPHEN 1 TABLET: 10; 325 TABLET ORAL at 08:07

## 2025-07-07 RX ADMIN — PREGABALIN 200 MG: 75 CAPSULE ORAL at 08:07

## 2025-07-07 RX ADMIN — LEVETIRACETAM 500 MG: 500 TABLET, FILM COATED ORAL at 09:07

## 2025-07-07 RX ADMIN — OXYCODONE HYDROCHLORIDE AND ACETAMINOPHEN 1 TABLET: 10; 325 TABLET ORAL at 05:07

## 2025-07-07 RX ADMIN — DOCUSATE SODIUM 100 MG: 100 CAPSULE, LIQUID FILLED ORAL at 09:07

## 2025-07-07 RX ADMIN — PREGABALIN 200 MG: 75 CAPSULE ORAL at 09:07

## 2025-07-07 NOTE — ASSESSMENT & PLAN NOTE
Noted.  Seizure etiology unknown.  Possibly also opiate withdrawal as per tele neurology .   EEG awaited  Continue Keppra

## 2025-07-07 NOTE — SUBJECTIVE & OBJECTIVE
Interval History:     Patient was seen and examined.  She said abdomen is getting better  No EEG yet.  Severely depleted potassium and supplementing    Review of Systems  Objective:     Vital Signs (Most Recent):  Temp: 98 °F (36.7 °C) (07/07/25 1129)  Pulse: 74 (07/07/25 1129)  Resp: 19 (07/07/25 1129)  BP: 118/68 (07/07/25 1129)  SpO2: 96 % (07/07/25 1129) Vital Signs (24h Range):  Temp:  [97.9 °F (36.6 °C)-98.1 °F (36.7 °C)] 98 °F (36.7 °C)  Pulse:  [57-88] 74  Resp:  [17-19] 19  SpO2:  [96 %-100 %] 96 %  BP: (118-172)/(65-81) 118/68     Weight: 63.6 kg (140 lb 3.4 oz)  Body mass index is 22.63 kg/m².    Intake/Output Summary (Last 24 hours) at 7/7/2025 1554  Last data filed at 7/6/2025 2321  Gross per 24 hour   Intake 60 ml   Output --   Net 60 ml         Physical Exam  Vitals and nursing note reviewed.   HENT:      Head: Normocephalic and atraumatic.   Eyes:      Conjunctiva/sclera: Conjunctivae normal.   Neck:      Vascular: No JVD.   Cardiovascular:      Heart sounds: Normal heart sounds.   Pulmonary:      Effort: Pulmonary effort is normal.      Breath sounds: Normal breath sounds.   Abdominal:      Palpations: Abdomen is soft.   Musculoskeletal:         General: Normal range of motion.   Skin:     General: Skin is warm.   Neurological:      Mental Status: She is alert and oriented to person, place, and time.   Psychiatric:         Mood and Affect: Mood normal.               Significant Labs: All pertinent labs within the past 24 hours have been reviewed.  CBC:   Recent Labs   Lab 07/06/25  0454 07/07/25  0446   WBC 8.27 7.58   HGB 9.8* 9.6*   HCT 31.6* 32.1*   * 367     CMP:   Recent Labs   Lab 07/06/25  0454 07/07/25  0446 07/07/25  1258    143 140   K 3.5 2.7* 3.5    109 108   CO2 25 25 25    94 113*   BUN 7* 5* 5*   CREATININE 0.6 0.5 0.5   CALCIUM 9.2 8.7 8.4*   PROT 6.6 6.0  --    ALBUMIN 3.7 3.5  --    BILITOT 0.3 0.3  --    ALKPHOS 96 89  --    AST 13 14  --    ALT 8* 6*  --  "   ANIONGAP 8 9 7*     Cardiac Markers: No results for input(s): "CKMB", "MYOGLOBIN", "BNP", "TROPISTAT" in the last 48 hours.    Significant Imaging: I have reviewed all pertinent imaging results/findings within the past 24 hours.  "

## 2025-07-07 NOTE — PROGRESS NOTES
Population Health Chart Review & Patient Outreach Details    Outreach Performed: YES Portal Active and/or Letter inactive    Additional [facility] Health Notes:      Osteoporosis screening (DEXA SCAN)     Non-compliant report chart audits for Osteoporosis screening (DEXA SCAN)     Outreach to patient in reference to SCHEDULING A Dexa Scan           Updates Requested / Reviewed:               Health Maintenance Topics Overdue:      VBHM Score: 1     Osteoporosis Screening  Uncontrolled BP    Tetanus Vaccine  Shingles/Zoster Vaccine  RSV Vaccine

## 2025-07-07 NOTE — PLAN OF CARE
07/07/25 0901   MARC Message   Medicare Outpatient and Observation Notification regarding financial responsibility Explained to patient/caregiver   Date MARC was signed 07/06/25   Time MARC was signed 0120

## 2025-07-07 NOTE — ASSESSMENT & PLAN NOTE
Chronic.    - Current loose bowel movements reported, none during this hospitalization today  - Hemoccult negative  Close monitor     Lab Results   Component Value Date    ALT 6 (L) 07/07/2025    AST 14 07/07/2025    ALKPHOS 89 07/07/2025    BILITOT 0.3 07/07/2025       Results for orders placed or performed during the hospital encounter of 07/04/25 (from the past 2160 hours)   CT Abdomen Pelvis With IV Contrast NO Oral Contrast    Narrative    EXAMINATION:  CT ABDOMEN PELVIS WITH IV CONTRAST    CLINICAL HISTORY:  LLQ abdominal pain;    TECHNIQUE:  Low dose axial images, sagittal and coronal reformations were obtained from the neck base to the pubic symphysis after the administration of 100 cc Omnipaque 350 intravenous contrast.  Oral contrast was not administered.    COMPARISON:  Comparison made with the patient's previous CT scan of the abdomen pelvis with contrast 04/23/2025.    FINDINGS:  Substantial anatomic distortion of the body cavities is noted as there is evidence fairly prominent complex alignment of the lumbar spine with apex of the scoliotic curvature centered over the L3 vertebral body with minimal lateral subluxation of L2 upon L3.  Chronic compression fractures are established at both the L2 and L3 vertebral body levels with evidence of moderate apical disturbance along the superior edge of the L4 vertebral body that appear chronic in time.  The overall bone mineral density structure appears markedly osteopenic.  Old healed fracture involving the right parasymphyseal symphysis pubis with evidence of osseous bridging.  Chronic degenerative changes of the right hip articulation.  Patient has undergone previous left hip arthroplasty causing significant band of beam hardening artifact.  Spherical low-density calcified nodule projects in the patient's right breast tissue likely reflecting area of benign fat necrosis.    Visualized lung bases demonstrates evidence of minimal pulmonary emphysematous  changes, 4 mm nodule projecting the patient's right lower lobe peripherally (series 3, image number 16), and calcified nodule projecting in the left upper lobe anteriorly (series 3, image number 3.  Minimal left lower lobe parenchymal scarring.  Thoracic aorta is tortuous becoming midline at the diaphragmatic hiatus.  Mild circumferential thickening of the distal esophagus above the level of the gastroesophageal junction on the basis of longstanding reflux are vagina is a chronic primary or infectious esophagitis.    Liver appears normal in size and of low-density with scattered spherical low-density lesions throughout the right hepatic lobe that are too small to characterize by CT criteria but favor benign cyst, post cholecystectomy changes with prominence involving the intrahepatic ducts.  The spleen appears normal in size and overall density with a small splenule about the inner margin.  Stomach appears decompressed with moderate thickening of the gastric antrum pre-pyloric region likely secondary to underdistention, although an element of active inflammatory changes cannot be excluded.  Diffuse inflammatory stranding in the central mesentery is noted.  Adrenal glands appear normal.  Kidneys excrete contrast satisfactory appears small spherical low-density cyst within the anterior interpolar region of the right kidney.  There is a small 3 mm stone in the inferior pole left kidney.    Persistent thickening along the descending segment of the colon which is of active colitis the similar in appearance as compared to previous.  There is some involvement of the ascending colon.  Liquified gas and stool throughout the entire colonic lumen is noted.  No evidence of intestinal obstruction.  The vascular structures appear normal.    Pelvic structures show evidence of normal appearance of the urinary bladder.  Small amount of free fluid in the right side of the pelvic cavity.  Post hysterectomy pelvis is noted.       Impression    1.  No CT evidence of acute intra-abdominal findings.    2.  Diffuse low-density changes noted throughout the descending colon on the basis of active colitis that appears similar to that from the patient's most recent examination with no evidence of detrimental change upon comparison.  Liquified gas and stool persist within the colonic lumen.    3.  Small amount of free fluid in the right side of the pelvic cavity.    4.  Prominence of all vein the intra biliary tracts in the setting of a resected gallbladder that is in expected findings patient of post cholecystectomy state.    5.  Several small spherical low-density cyst are established throughout the hepatic architecture some which appear too small to characterize by CT criteria but favor benign cyst.    6.  Diffuse thickening of the distal esophagus above the level the GE junction suggestive of primary esophagitis or sequela of chronic reflux esophagitis.      Electronically signed by: Kurtis Logan MD  Date:    07/04/2025  Time:    14:19

## 2025-07-07 NOTE — PROGRESS NOTES
[facility] Medicine  Progress Note    Patient Name: Froilan Ray  MRN: [id number]  Patient Class: IP- Inpatient   Admission Date: 7/4/2025  Length of Stay: 2 days  Attending Physician: Pierre Floyd MD  Primary Care Provider: Alphonse Boothe III, MD        Subjective     Principal Problem:History of seizures        HPI:  Froilan Duran is a 79-year-old female with a past medical history of anemia, peptic ulcer disease, hypertension,  take colitis, colitis, chronic opioid dependence, and seizures who presents to the ED for the 2nd time today.  Original complaints or multiple days abdominal pain that were described as moderate to severe with nausea vomiting accompanied by frequent loose dark stools.  Hemoccult was negative.  Family reports she was also diaphoretic last night.  After patient was discharged after negative findings this morning, patient had 2 episodes of seizure activity approximately 3 hours apart.  EMS reports postictal state on arrival.  Family reports that she has been reducing her opioid medications and increasing marijuana use.  It is believed by family she may have not been taking her medication last night, and they know she did not take it today due to being in the hospital for the abdominal pain earlier today.  ED workup was positive for ictal tongue biting, mildly elevated troponin of 30.1 and repeat of 52.9, 2nd troponin pending, hypokalemia at 3.2, and anemia with H&H of 9.8/32.3.  2400 mg of Keppra given in ED. hospital Medicine was consulted, and patient and family are agreeable to hospital admission for close monitoring, medical management, and further workup.    Overview/Hospital Course:   79-year-old female with a past medical history of anemia, peptic ulcer disease, hypertension,  take colitis, colitis, chronic opioid dependence, and seizures was admitted with seizure activity 2 times noticed by the son.   Original complaints or multiple days  abdominal pain that were described as moderate to severe with nausea vomiting accompanied by frequent loose dark stools as well.  Family reports that she has been reducing her opioid medications and increasing marijuana use.  positive for ictal tongue biting.  Patient was reviewed by tele neurology and suspect opiate withdrawal seizure since patient was not on seizure medications for a long time.  EEG will be done.  CT of the abdomen pelvis was done with no acute abdomen and previous possible colitis findings are unchanged    Interval History:     Patient was seen and examined.  She said abdomen is getting better  No EEG yet.  Severely depleted potassium and supplementing    Review of Systems  Objective:     Vital Signs (Most Recent):  Temp: 98 °F (36.7 °C) (07/07/25 1129)  Pulse: 74 (07/07/25 1129)  Resp: 19 (07/07/25 1129)  BP: 118/68 (07/07/25 1129)  SpO2: 96 % (07/07/25 1129) Vital Signs (24h Range):  Temp:  [97.9 °F (36.6 °C)-98.1 °F (36.7 °C)] 98 °F (36.7 °C)  Pulse:  [57-88] 74  Resp:  [17-19] 19  SpO2:  [96 %-100 %] 96 %  BP: (118-172)/(65-81) 118/68     Weight: 63.6 kg (140 lb 3.4 oz)  Body mass index is 22.63 kg/m².    Intake/Output Summary (Last 24 hours) at 7/7/2025 1554  Last data filed at 7/6/2025 2321  Gross per 24 hour   Intake 60 ml   Output --   Net 60 ml         Physical Exam  Vitals and nursing note reviewed.   HENT:      Head: Normocephalic and atraumatic.   Eyes:      Conjunctiva/sclera: Conjunctivae normal.   Neck:      Vascular: No JVD.   Cardiovascular:      Heart sounds: Normal heart sounds.   Pulmonary:      Effort: Pulmonary effort is normal.      Breath sounds: Normal breath sounds.   Abdominal:      Palpations: Abdomen is soft.   Musculoskeletal:         General: Normal range of motion.   Skin:     General: Skin is warm.   Neurological:      Mental Status: She is alert and oriented to person, place, and time.   Psychiatric:         Mood and Affect: Mood normal.               Significant  "Labs: All pertinent labs within the past 24 hours have been reviewed.  CBC:   Recent Labs   Lab 07/06/25  0454 07/07/25  0446   WBC 8.27 7.58   HGB 9.8* 9.6*   HCT 31.6* 32.1*   * 367     CMP:   Recent Labs   Lab 07/06/25  0454 07/07/25  0446 07/07/25  1258    143 140   K 3.5 2.7* 3.5    109 108   CO2 25 25 25    94 113*   BUN 7* 5* 5*   CREATININE 0.6 0.5 0.5   CALCIUM 9.2 8.7 8.4*   PROT 6.6 6.0  --    ALBUMIN 3.7 3.5  --    BILITOT 0.3 0.3  --    ALKPHOS 96 89  --    AST 13 14  --    ALT 8* 6*  --    ANIONGAP 8 9 7*     Cardiac Markers: No results for input(s): "CKMB", "MYOGLOBIN", "BNP", "TROPISTAT" in the last 48 hours.    Significant Imaging: I have reviewed all pertinent imaging results/findings within the past 24 hours.      Assessment & Plan  Seizures concurrent with and due to substance withdrawal  Reviewed by the Neurology and suspect opiate withdrawal seizures    - 500 mg Keppra b.i.d.  - Resume home opiate dose at decreased frequency  - Urine drug screen noted  - Seizure precautions  Colitis  Chronic.    - Current loose bowel movements reported, none during this hospitalization today  - Hemoccult negative  Close monitor     Lab Results   Component Value Date    ALT 6 (L) 07/07/2025    AST 14 07/07/2025    ALKPHOS 89 07/07/2025    BILITOT 0.3 07/07/2025       Results for orders placed or performed during the hospital encounter of 07/04/25 (from the past 2160 hours)   CT Abdomen Pelvis With IV Contrast NO Oral Contrast    Narrative    EXAMINATION:  CT ABDOMEN PELVIS WITH IV CONTRAST    CLINICAL HISTORY:  LLQ abdominal pain;    TECHNIQUE:  Low dose axial images, sagittal and coronal reformations were obtained from the neck base to the pubic symphysis after the administration of 100 cc Omnipaque 350 intravenous contrast.  Oral contrast was not administered.    COMPARISON:  Comparison made with the patient's previous CT scan of the abdomen pelvis with contrast " 04/23/2025.    FINDINGS:  Substantial anatomic distortion of the body cavities is noted as there is evidence fairly prominent complex alignment of the lumbar spine with apex of the scoliotic curvature centered over the L3 vertebral body with minimal lateral subluxation of L2 upon L3.  Chronic compression fractures are established at both the L2 and L3 vertebral body levels with evidence of moderate apical disturbance along the superior edge of the L4 vertebral body that appear chronic in time.  The overall bone mineral density structure appears markedly osteopenic.  Old healed fracture involving the right parasymphyseal symphysis pubis with evidence of osseous bridging.  Chronic degenerative changes of the right hip articulation.  Patient has undergone previous left hip arthroplasty causing significant band of beam hardening artifact.  Spherical low-density calcified nodule projects in the patient's right breast tissue likely reflecting area of benign fat necrosis.    Visualized lung bases demonstrates evidence of minimal pulmonary emphysematous changes, 4 mm nodule projecting the patient's right lower lobe peripherally (series 3, image number 16), and calcified nodule projecting in the left upper lobe anteriorly (series 3, image number 3.  Minimal left lower lobe parenchymal scarring.  Thoracic aorta is tortuous becoming midline at the diaphragmatic hiatus.  Mild circumferential thickening of the distal esophagus above the level of the gastroesophageal junction on the basis of longstanding reflux are vagina is a chronic primary or infectious esophagitis.    Liver appears normal in size and of low-density with scattered spherical low-density lesions throughout the right hepatic lobe that are too small to characterize by CT criteria but favor benign cyst, post cholecystectomy changes with prominence involving the intrahepatic ducts.  The spleen appears normal in size and overall density with a small splenule about  the inner margin.  Stomach appears decompressed with moderate thickening of the gastric antrum pre-pyloric region likely secondary to underdistention, although an element of active inflammatory changes cannot be excluded.  Diffuse inflammatory stranding in the central mesentery is noted.  Adrenal glands appear normal.  Kidneys excrete contrast satisfactory appears small spherical low-density cyst within the anterior interpolar region of the right kidney.  There is a small 3 mm stone in the inferior pole left kidney.    Persistent thickening along the descending segment of the colon which is of active colitis the similar in appearance as compared to previous.  There is some involvement of the ascending colon.  Liquified gas and stool throughout the entire colonic lumen is noted.  No evidence of intestinal obstruction.  The vascular structures appear normal.    Pelvic structures show evidence of normal appearance of the urinary bladder.  Small amount of free fluid in the right side of the pelvic cavity.  Post hysterectomy pelvis is noted.      Impression    1.  No CT evidence of acute intra-abdominal findings.    2.  Diffuse low-density changes noted throughout the descending colon on the basis of active colitis that appears similar to that from the patient's most recent examination with no evidence of detrimental change upon comparison.  Liquified gas and stool persist within the colonic lumen.    3.  Small amount of free fluid in the right side of the pelvic cavity.    4.  Prominence of all vein the intra biliary tracts in the setting of a resected gallbladder that is in expected findings patient of post cholecystectomy state.    5.  Several small spherical low-density cyst are established throughout the hepatic architecture some which appear too small to characterize by CT criteria but favor benign cyst.    6.  Diffuse thickening of the distal esophagus above the level the GE junction suggestive of primary  esophagitis or sequela of chronic reflux esophagitis.      Electronically signed by: Kurtis Logan MD  Date:    07/04/2025  Time:    14:19     History of seizures  Noted.  Seizure etiology unknown.  Possibly also opiate withdrawal as per tele neurology .   EEG awaited  Continue Keppra  Anemia  Anemia is likely due to chronic disease. Most recent hemoglobin and hematocrit are listed below.  Recent Labs     07/05/25  0335 07/06/25  0454 07/07/25  0446   HGB 9.8* 9.8* 9.6*   HCT 32.4* 31.6* 32.1*     Plan  - Monitor serial CBC: Daily  - Transfuse PRBC if patient becomes hemodynamically unstable, symptomatic or H/H drops below 7/21.  - Patient has not received any PRBC transfusions to date  - Patient's anemia is currently stable  Opioid dependence with withdrawal  Decades of opiate use per family.  Currently prescribed 10/325 4 times daily.    - Family reports recent decrease in use with concurrent increase in marijuana use  Possible withdrawal seizure      VTE Risk Mitigation (From admission, onward)           Ordered     enoxaparin injection 40 mg  Daily         07/05/25 0046     IP VTE HIGH RISK PATIENT  Once         07/05/25 0046     Place sequential compression device  Until discontinued         07/05/25 0046                    Discharge Planning   BAILEY: 7/9/2025     Code Status: Full Code   Medical Readiness for Discharge Date:   Discharge Plan A: Home with family                        Pierre Floyd MD  Department of Hospital Medicine   [facility]

## 2025-07-07 NOTE — ASSESSMENT & PLAN NOTE
Anemia is likely due to chronic disease. Most recent hemoglobin and hematocrit are listed below.  Recent Labs     07/05/25  0335 07/06/25  0454 07/07/25  0446   HGB 9.8* 9.8* 9.6*   HCT 32.4* 31.6* 32.1*     Plan  - Monitor serial CBC: Daily  - Transfuse PRBC if patient becomes hemodynamically unstable, symptomatic or H/H drops below 7/21.  - Patient has not received any PRBC transfusions to date  - Patient's anemia is currently stable

## 2025-07-08 VITALS
OXYGEN SATURATION: 96 % | HEART RATE: 89 BPM | RESPIRATION RATE: 17 BRPM | SYSTOLIC BLOOD PRESSURE: 150 MMHG | DIASTOLIC BLOOD PRESSURE: 72 MMHG | BODY MASS INDEX: 22.53 KG/M2 | HEIGHT: 66 IN | WEIGHT: 140.19 LBS | TEMPERATURE: 98 F

## 2025-07-08 LAB
ABSOLUTE EOSINOPHIL (SMH): 0.22 K/UL
ABSOLUTE MONOCYTE (SMH): 0.56 K/UL (ref 0.3–1)
ABSOLUTE NEUTROPHIL COUNT (SMH): 4 K/UL (ref 1.8–7.7)
ALBUMIN SERPL-MCNC: 3.1 G/DL (ref 3.5–5.2)
ALP SERPL-CCNC: 94 UNIT/L (ref 55–135)
ALT SERPL-CCNC: 8 UNIT/L (ref 10–44)
ANION GAP (SMH): 8 MMOL/L (ref 8–16)
AST SERPL-CCNC: 12 UNIT/L (ref 10–40)
BACTERIA STL CULT: NORMAL
BASOPHILS # BLD AUTO: 0.02 K/UL
BASOPHILS NFR BLD AUTO: 0.3 %
BILIRUB SERPL-MCNC: 0.2 MG/DL (ref 0.1–1)
BUN SERPL-MCNC: 10 MG/DL (ref 8–23)
CALCIUM SERPL-MCNC: 8.4 MG/DL (ref 8.7–10.5)
CHLORIDE SERPL-SCNC: 108 MMOL/L (ref 95–110)
CO2 SERPL-SCNC: 24 MMOL/L (ref 23–29)
CREAT SERPL-MCNC: 0.6 MG/DL (ref 0.5–1.4)
ERYTHROCYTE [DISTWIDTH] IN BLOOD BY AUTOMATED COUNT: 15.8 % (ref 11.5–14.5)
GFR SERPLBLD CREATININE-BSD FMLA CKD-EPI: >60 ML/MIN/1.73/M2
GLUCOSE SERPL-MCNC: 135 MG/DL (ref 70–110)
HCT VFR BLD AUTO: 30.2 % (ref 37–48.5)
HGB BLD-MCNC: 9.2 GM/DL (ref 12–16)
IMM GRANULOCYTES # BLD AUTO: 0.03 K/UL (ref 0–0.04)
IMM GRANULOCYTES NFR BLD AUTO: 0.5 % (ref 0–0.5)
LEVETIRACETAM SERPL-MCNC: <2 UG/ML (ref 10–40)
LYMPHOCYTES # BLD AUTO: 1.49 K/UL (ref 1–4.8)
MAGNESIUM SERPL-MCNC: 1.7 MG/DL (ref 1.6–2.6)
MCH RBC QN AUTO: 24.4 PG (ref 27–31)
MCHC RBC AUTO-ENTMCNC: 30.5 G/DL (ref 32–36)
MCV RBC AUTO: 80 FL (ref 82–98)
NUCLEATED RBC (/100WBC) (SMH): 0 /100 WBC
PLATELET # BLD AUTO: 330 K/UL (ref 150–450)
PMV BLD AUTO: 9.2 FL (ref 9.2–12.9)
POTASSIUM SERPL-SCNC: 3.1 MMOL/L (ref 3.5–5.1)
PROT SERPL-MCNC: 5.5 GM/DL (ref 6–8.4)
RBC # BLD AUTO: 3.77 M/UL (ref 4–5.4)
RELATIVE EOSINOPHIL (SMH): 3.5 % (ref 0–8)
RELATIVE LYMPHOCYTE (SMH): 23.7 % (ref 18–48)
RELATIVE MONOCYTE (SMH): 8.9 % (ref 4–15)
RELATIVE NEUTROPHIL (SMH): 63.1 % (ref 38–73)
SODIUM SERPL-SCNC: 140 MMOL/L (ref 136–145)
WBC # BLD AUTO: 6.3 K/UL (ref 3.9–12.7)

## 2025-07-08 PROCEDURE — 25000003 PHARM REV CODE 250

## 2025-07-08 PROCEDURE — 95819 EEG AWAKE AND ASLEEP: CPT

## 2025-07-08 PROCEDURE — 80053 COMPREHEN METABOLIC PANEL: CPT

## 2025-07-08 PROCEDURE — 83735 ASSAY OF MAGNESIUM: CPT

## 2025-07-08 PROCEDURE — 25000003 PHARM REV CODE 250: Performed by: INTERNAL MEDICINE

## 2025-07-08 PROCEDURE — 85025 COMPLETE CBC W/AUTO DIFF WBC: CPT

## 2025-07-08 PROCEDURE — 36415 COLL VENOUS BLD VENIPUNCTURE: CPT

## 2025-07-08 RX ORDER — LEVETIRACETAM 500 MG/1
500 TABLET ORAL 2 TIMES DAILY
Qty: 180 TABLET | Refills: 0 | Status: SHIPPED | OUTPATIENT
Start: 2025-07-08 | End: 2025-07-08

## 2025-07-08 RX ORDER — LEVETIRACETAM 500 MG/1
500 TABLET ORAL 2 TIMES DAILY
Qty: 180 TABLET | Refills: 0 | Status: SHIPPED | OUTPATIENT
Start: 2025-07-08

## 2025-07-08 RX ORDER — PREGABALIN 200 MG/1
200 CAPSULE ORAL 2 TIMES DAILY
Qty: 90 CAPSULE | Refills: 0 | Status: SHIPPED | OUTPATIENT
Start: 2025-07-08 | End: 2025-08-22

## 2025-07-08 RX ORDER — CLONIDINE HYDROCHLORIDE 0.1 MG/1
0.1 TABLET ORAL ONCE
Status: COMPLETED | OUTPATIENT
Start: 2025-07-08 | End: 2025-07-08

## 2025-07-08 RX ADMIN — OXYCODONE HYDROCHLORIDE AND ACETAMINOPHEN 1 TABLET: 10; 325 TABLET ORAL at 09:07

## 2025-07-08 RX ADMIN — LISINOPRIL 20 MG: 20 TABLET ORAL at 09:07

## 2025-07-08 RX ADMIN — LEVETIRACETAM 500 MG: 500 TABLET, FILM COATED ORAL at 09:07

## 2025-07-08 RX ADMIN — AMLODIPINE BESYLATE 10 MG: 5 TABLET ORAL at 09:07

## 2025-07-08 RX ADMIN — POTASSIUM BICARBONATE 35 MEQ: 391 TABLET, EFFERVESCENT ORAL at 09:07

## 2025-07-08 RX ADMIN — Medication 800 MG: at 11:07

## 2025-07-08 RX ADMIN — Medication 800 MG: at 09:07

## 2025-07-08 RX ADMIN — PREGABALIN 200 MG: 75 CAPSULE ORAL at 09:07

## 2025-07-08 RX ADMIN — CLONIDINE HYDROCHLORIDE 0.1 MG: 0.1 TABLET ORAL at 09:07

## 2025-07-08 NOTE — DISCHARGE SUMMARY
[facility] Medicine  Discharge Summary      Patient Name: Froilan Ray  MRN: [id number]  [facility]: [id number]  Patient Class: IP- Inpatient  Admission Date: 7/4/2025  Hospital Length of Stay: 3 days  Discharge Date and Time: 07/08/2025 3:30 PM  Attending Physician: Pierre Floyd MD   Discharging Provider: Pierre Floyd MD  Primary Care Provider: Alphonse Boothe III, MD    Primary Care Team: Networked reference to record PCT     HPI:   Froilan Duran is a 79-year-old female with a past medical history of anemia, peptic ulcer disease, hypertension,  take colitis, colitis, chronic opioid dependence, and seizures who presents to the ED for the 2nd time today.  Original complaints or multiple days abdominal pain that were described as moderate to severe with nausea vomiting accompanied by frequent loose dark stools.  Hemoccult was negative.  Family reports she was also diaphoretic last night.  After patient was discharged after negative findings this morning, patient had 2 episodes of seizure activity approximately 3 hours apart.  EMS reports postictal state on arrival.  Family reports that she has been reducing her opioid medications and increasing marijuana use.  It is believed by family she may have not been taking her medication last night, and they know she did not take it today due to being in the hospital for the abdominal pain earlier today.  ED workup was positive for ictal tongue biting, mildly elevated troponin of 30.1 and repeat of 52.9, 2nd troponin pending, hypokalemia at 3.2, and anemia with H&H of 9.8/32.3.  2400 mg of Keppra given in ED. hospital Medicine was consulted, and patient and family are agreeable to hospital admission for close monitoring, medical management, and further workup.    * No surgery found *      Hospital Course:    79-year-old female with a past medical history of anemia, peptic ulcer disease, hypertension,  take colitis, colitis, chronic  opioid dependence, and seizures was admitted with seizure activity 2 times noticed by the son.   Original complaints or multiple days abdominal pain that were described as moderate to severe with nausea vomiting accompanied by frequent loose dark stools as well.  Family reports that she has been reducing her opioid medications and increasing marijuana use.  positive for ictal tongue biting.  Patient was reviewed by tele neurology and suspect opiate withdrawal seizure but unable to exclude her seizure recurrence since patient was not on seizure medications for a long time.  EEG was not reported at the time of the discharge but no more seizure activity during the hospital stay.   CT of the abdomen pelvis was done with no acute abdomen and previous possible colitis findings are unchanged and later ate well and no more complaint .  Her blood pressure is higher side and increased amlodipine dose and better controlled.  Overall patient is getting better, no more AMS and eating well and blood pressure better controlled and discharged in stable condition with Keppra to continue and follow up with the Neurology as outpatient.     Goals of Care Treatment Preferences:  Code Status: Full Code          What is most important right now is to focus on symptom/pain control.  Accordingly, we have decided that the best plan to meet the patient's goals includes continuing with treatment.         Consults:   Consults (From admission, onward)          Status Ordering Provider     Inpatient Consult to Neurology Services (General Neurology)  Once        Provider:  (Not yet assigned)    Completed OSMIN ASKEW            Assessment & Plan  Seizures concurrent with and due to substance withdrawal  Reviewed by the Neurology and suspect opiate withdrawal seizures    - 500 mg Keppra b.i.d.  - Resume home opiate dose at decreased frequency  - Urine drug screen noted  - Seizure precautions  Colitis  Chronic.    - Current loose bowel movements  reported, none during this hospitalization today  - Hemoccult negative  Close monitor     Lab Results   Component Value Date    ALT 8 (L) 07/08/2025    AST 12 07/08/2025    ALKPHOS 94 07/08/2025    BILITOT 0.2 07/08/2025       Results for orders placed or performed during the hospital encounter of 07/04/25 (from the past 2160 hours)   CT Abdomen Pelvis With IV Contrast NO Oral Contrast    Narrative    EXAMINATION:  CT ABDOMEN PELVIS WITH IV CONTRAST    CLINICAL HISTORY:  LLQ abdominal pain;    TECHNIQUE:  Low dose axial images, sagittal and coronal reformations were obtained from the neck base to the pubic symphysis after the administration of 100 cc Omnipaque 350 intravenous contrast.  Oral contrast was not administered.    COMPARISON:  Comparison made with the patient's previous CT scan of the abdomen pelvis with contrast 04/23/2025.    FINDINGS:  Substantial anatomic distortion of the body cavities is noted as there is evidence fairly prominent complex alignment of the lumbar spine with apex of the scoliotic curvature centered over the L3 vertebral body with minimal lateral subluxation of L2 upon L3.  Chronic compression fractures are established at both the L2 and L3 vertebral body levels with evidence of moderate apical disturbance along the superior edge of the L4 vertebral body that appear chronic in time.  The overall bone mineral density structure appears markedly osteopenic.  Old healed fracture involving the right parasymphyseal symphysis pubis with evidence of osseous bridging.  Chronic degenerative changes of the right hip articulation.  Patient has undergone previous left hip arthroplasty causing significant band of beam hardening artifact.  Spherical low-density calcified nodule projects in the patient's right breast tissue likely reflecting area of benign fat necrosis.    Visualized lung bases demonstrates evidence of minimal pulmonary emphysematous changes, 4 mm nodule projecting the patient's right  lower lobe peripherally (series 3, image number 16), and calcified nodule projecting in the left upper lobe anteriorly (series 3, image number 3.  Minimal left lower lobe parenchymal scarring.  Thoracic aorta is tortuous becoming midline at the diaphragmatic hiatus.  Mild circumferential thickening of the distal esophagus above the level of the gastroesophageal junction on the basis of longstanding reflux are vagina is a chronic primary or infectious esophagitis.    Liver appears normal in size and of low-density with scattered spherical low-density lesions throughout the right hepatic lobe that are too small to characterize by CT criteria but favor benign cyst, post cholecystectomy changes with prominence involving the intrahepatic ducts.  The spleen appears normal in size and overall density with a small splenule about the inner margin.  Stomach appears decompressed with moderate thickening of the gastric antrum pre-pyloric region likely secondary to underdistention, although an element of active inflammatory changes cannot be excluded.  Diffuse inflammatory stranding in the central mesentery is noted.  Adrenal glands appear normal.  Kidneys excrete contrast satisfactory appears small spherical low-density cyst within the anterior interpolar region of the right kidney.  There is a small 3 mm stone in the inferior pole left kidney.    Persistent thickening along the descending segment of the colon which is of active colitis the similar in appearance as compared to previous.  There is some involvement of the ascending colon.  Liquified gas and stool throughout the entire colonic lumen is noted.  No evidence of intestinal obstruction.  The vascular structures appear normal.    Pelvic structures show evidence of normal appearance of the urinary bladder.  Small amount of free fluid in the right side of the pelvic cavity.  Post hysterectomy pelvis is noted.      Impression    1.  No CT evidence of acute intra-abdominal  findings.    2.  Diffuse low-density changes noted throughout the descending colon on the basis of active colitis that appears similar to that from the patient's most recent examination with no evidence of detrimental change upon comparison.  Liquified gas and stool persist within the colonic lumen.    3.  Small amount of free fluid in the right side of the pelvic cavity.    4.  Prominence of all vein the intra biliary tracts in the setting of a resected gallbladder that is in expected findings patient of post cholecystectomy state.    5.  Several small spherical low-density cyst are established throughout the hepatic architecture some which appear too small to characterize by CT criteria but favor benign cyst.    6.  Diffuse thickening of the distal esophagus above the level the GE junction suggestive of primary esophagitis or sequela of chronic reflux esophagitis.      Electronically signed by: Kurtis Logan MD  Date:    07/04/2025  Time:    14:19     History of seizures  Noted.  Seizure etiology unknown.  Possibly also opiate withdrawal as per tele neurology .   EEG awaited  Continue Keppra  Anemia  Anemia is likely due to chronic disease. Most recent hemoglobin and hematocrit are listed below.  Recent Labs     07/06/25  0454 07/07/25  0446 07/08/25  0458   HGB 9.8* 9.6* 9.2*   HCT 31.6* 32.1* 30.2*     Plan  - Monitor serial CBC: Daily  - Transfuse PRBC if patient becomes hemodynamically unstable, symptomatic or H/H drops below 7/21.  - Patient has not received any PRBC transfusions to date  - Patient's anemia is currently stable  Opioid dependence with withdrawal  Decades of opiate use per family.  Currently prescribed 10/325 4 times daily.    - Family reports recent decrease in use with concurrent increase in marijuana use  Possible withdrawal seizure      Final Active Diagnoses:    Diagnosis Date Noted POA    PRINCIPAL PROBLEM:  History of seizures [Z87.898] 06/30/2022 Yes    Seizures concurrent with and  due to substance withdrawal [F19.939] 07/05/2025 Yes    Opioid dependence with withdrawal [F11.23] 07/05/2025 Yes    Anemia [D64.9] 04/12/2025 Yes    Colitis [K52.9] 01/08/2020 Yes      Problems Resolved During this Admission:       Discharged Condition: good    Disposition: Home or Self Care    Follow Up:   Follow-up Information       Alphonse Boothe III, MD Follow up in 1 month(s).    Specialty: Family Medicine  Contact information:  [address] [place] [address]  [address]  [facility] [address]  [phone]                           Patient Instructions:      Ambulatory referral/consult to Neurology   Standing Status: Future   Referral Priority: Routine Referral Type: Consultation   Referral Reason: Specialty Services Required   Referred to Provider: JOHN MCLEAN Requested Specialty: Neurology   Number of Visits Requested: 1     Diet Adult Regular     Diet Cardiac     Activity as tolerated     Activity as tolerated       Significant Diagnostic Studies: Labs: CMP   Recent Labs   Lab 07/07/25  0446 07/07/25  1258 07/08/25  0458    140 140   K 2.7* 3.5 3.1*    108 108   CO2 25 25 24   GLU 94 113* 135*   BUN 5* 5* 10   CREATININE 0.5 0.5 0.6   CALCIUM 8.7 8.4* 8.4*   PROT 6.0  --  5.5*   ALBUMIN 3.5  --  3.1*   BILITOT 0.3  --  0.2   ALKPHOS 89  --  94   AST 14  --  12   ALT 6*  --  8*   ANIONGAP 9 7* 8    and CBC   Recent Labs   Lab 07/07/25  0446 07/08/25  0458   WBC 7.58 6.30   HGB 9.6* 9.2*   HCT 32.1* 30.2*    330       Pending Diagnostic Studies:       Procedure Component Value Units Date/Time    EXTRA TUBES [[id number]] Collected: 07/04/25 2146    Order Status: Sent Lab Status: In process Updated: 07/04/25 2155    Specimen: Blood, Venous     Narrative:      The following orders were created for panel order EXTRA TUBES.  Procedure                               Abnormality         Status                     ---------                               -----------         ------                     Light Blue  Top Hold[[id number]]                             In process                 Lavender Top Hold[[id number]]                               In process                 Gold Top Hold[[id number]]                                   In process                   Please view results for these tests on the individual orders.    [facility] Miscellaneous Test O+P Exam, Formalin Only; [id number] [[id number]] Collected: 07/06/25 0911    Order Status: Sent Lab Status: In process Updated: 07/07/25 0625    Specimen: Stool            Medications:  Reconciled Home Medications:      Medication List        CHANGE how you take these medications      pregabalin 200 MG Cap  Commonly known as: LYRICA  Take 1 capsule (200 mg total) by mouth 2 (two) times daily.  What changed: when to take this            CONTINUE taking these medications      acetaminophen 325 MG tablet  Commonly known as: TYLENOL  Take 2 tablets (650 mg total) by mouth every 8 (eight) hours as needed for Pain (pain scale 1-4).     amlodipine-benazepril 5-20 mg 5-20 mg per capsule  Commonly known as: LOTREL  Take 1 capsule by mouth once daily.     docusate sodium 100 MG capsule  Commonly known as: COLACE  Take 1 capsule (100 mg total) by mouth 2 (two) times daily.     famotidine 20 MG tablet  Commonly known as: PEPCID  Take 20 mg by mouth daily as needed for Heartburn.     levETIRAcetam 500 MG Tab  Commonly known as: KEPPRA  Take 1 tablet (500 mg total) by mouth 2 (two) times daily.     multivitamin with minerals tablet  Take 1 tablet by mouth once daily.     ondansetron 4 MG Tbdl  Commonly known as: ZOFRAN-ODT  Take 1 tablet (4 mg total) by mouth every 8 (eight) hours as needed (nausea, vomiting).     pantoprazole 40 MG tablet  Commonly known as: PROTONIX  Take 1 tablet (40 mg total) by mouth once daily.            ASK your doctor about these medications      cyclobenzaprine 10 MG tablet  Commonly known as: FLEXERIL  Take 1 tablet (10 mg total) by mouth 3 (three) times daily as  needed for Muscle spasms.     oxyCODONE-acetaminophen  mg per tablet  Commonly known as: PERCOCET  Take 1 tablet by mouth every 4 (four) hours as needed for Pain.              Indwelling Lines/Drains at time of discharge:   Lines/Drains/Airways       None                   General: Patient resting comfortably in no acute distress. Appears as stated age. Calm  Eyes: EOM intact. No conjunctivae injection. No scleral icterus.  ENT: Hearing grossly intact. No discharge from ears. No nasal discharge.   CVS: RRR. No LE edema BL.  Lungs: CTA BL, no wheezing or crackles. Good breath sounds. No accessory muscle use. No acute respiratory distress  Neuro: non focal , Follows commands. Responds appropriately     Time spent on the discharge of patient: 32 minutes         Pierre Floyd MD  Department of Hospital Medicine  [facility]

## 2025-07-08 NOTE — NURSING
Discharge instructions given and reviewed with patient. Verbalized understanding. IV removed without difficulty, catheter intact. Patient discharged off the unit in stable condition to personal vehicle with spouse.

## 2025-07-08 NOTE — ASSESSMENT & PLAN NOTE
Chronic.    - Current loose bowel movements reported, none during this hospitalization today  - Hemoccult negative  Close monitor     Lab Results   Component Value Date    ALT 8 (L) 07/08/2025    AST 12 07/08/2025    ALKPHOS 94 07/08/2025    BILITOT 0.2 07/08/2025       Results for orders placed or performed during the hospital encounter of 07/04/25 (from the past 2160 hours)   CT Abdomen Pelvis With IV Contrast NO Oral Contrast    Narrative    EXAMINATION:  CT ABDOMEN PELVIS WITH IV CONTRAST    CLINICAL HISTORY:  LLQ abdominal pain;    TECHNIQUE:  Low dose axial images, sagittal and coronal reformations were obtained from the neck base to the pubic symphysis after the administration of 100 cc Omnipaque 350 intravenous contrast.  Oral contrast was not administered.    COMPARISON:  Comparison made with the patient's previous CT scan of the abdomen pelvis with contrast 04/23/2025.    FINDINGS:  Substantial anatomic distortion of the body cavities is noted as there is evidence fairly prominent complex alignment of the lumbar spine with apex of the scoliotic curvature centered over the L3 vertebral body with minimal lateral subluxation of L2 upon L3.  Chronic compression fractures are established at both the L2 and L3 vertebral body levels with evidence of moderate apical disturbance along the superior edge of the L4 vertebral body that appear chronic in time.  The overall bone mineral density structure appears markedly osteopenic.  Old healed fracture involving the right parasymphyseal symphysis pubis with evidence of osseous bridging.  Chronic degenerative changes of the right hip articulation.  Patient has undergone previous left hip arthroplasty causing significant band of beam hardening artifact.  Spherical low-density calcified nodule projects in the patient's right breast tissue likely reflecting area of benign fat necrosis.    Visualized lung bases demonstrates evidence of minimal pulmonary emphysematous  changes, 4 mm nodule projecting the patient's right lower lobe peripherally (series 3, image number 16), and calcified nodule projecting in the left upper lobe anteriorly (series 3, image number 3.  Minimal left lower lobe parenchymal scarring.  Thoracic aorta is tortuous becoming midline at the diaphragmatic hiatus.  Mild circumferential thickening of the distal esophagus above the level of the gastroesophageal junction on the basis of longstanding reflux are vagina is a chronic primary or infectious esophagitis.    Liver appears normal in size and of low-density with scattered spherical low-density lesions throughout the right hepatic lobe that are too small to characterize by CT criteria but favor benign cyst, post cholecystectomy changes with prominence involving the intrahepatic ducts.  The spleen appears normal in size and overall density with a small splenule about the inner margin.  Stomach appears decompressed with moderate thickening of the gastric antrum pre-pyloric region likely secondary to underdistention, although an element of active inflammatory changes cannot be excluded.  Diffuse inflammatory stranding in the central mesentery is noted.  Adrenal glands appear normal.  Kidneys excrete contrast satisfactory appears small spherical low-density cyst within the anterior interpolar region of the right kidney.  There is a small 3 mm stone in the inferior pole left kidney.    Persistent thickening along the descending segment of the colon which is of active colitis the similar in appearance as compared to previous.  There is some involvement of the ascending colon.  Liquified gas and stool throughout the entire colonic lumen is noted.  No evidence of intestinal obstruction.  The vascular structures appear normal.    Pelvic structures show evidence of normal appearance of the urinary bladder.  Small amount of free fluid in the right side of the pelvic cavity.  Post hysterectomy pelvis is noted.       Impression    1.  No CT evidence of acute intra-abdominal findings.    2.  Diffuse low-density changes noted throughout the descending colon on the basis of active colitis that appears similar to that from the patient's most recent examination with no evidence of detrimental change upon comparison.  Liquified gas and stool persist within the colonic lumen.    3.  Small amount of free fluid in the right side of the pelvic cavity.    4.  Prominence of all vein the intra biliary tracts in the setting of a resected gallbladder that is in expected findings patient of post cholecystectomy state.    5.  Several small spherical low-density cyst are established throughout the hepatic architecture some which appear too small to characterize by CT criteria but favor benign cyst.    6.  Diffuse thickening of the distal esophagus above the level the GE junction suggestive of primary esophagitis or sequela of chronic reflux esophagitis.      Electronically signed by: Kurtis Logan MD  Date:    07/04/2025  Time:    14:19

## 2025-07-08 NOTE — PLAN OF CARE
Patient cleared for discharge from case management standpoint, pending uncomplicated completion of EEG.    Follow up appointments suggested and information added to AVS.    Chart and discharge orders reviewed.  Patient discharged home with no further case management needs.      07/08/25 1435   Final Note   Assessment Type Final Discharge Note   Anticipated Discharge Disposition Home   Hospital Resources/Appts/Education Provided Provided patient/caregiver with written discharge plan information;Provided education on problems/symptoms using teachback   Post-Acute Status   Discharge Delays (!) Procedure Scheduling (IR, OR, Labs, Echo, Cath, Echo, EEG)

## 2025-07-08 NOTE — PROCEDURES
DATE: 7/8/25    EEG NUMBER: SM     REFERRING PHYSICIAN:  Dr. Floyd      This EEG was performed to assess for subclinical seizures      ELECTROENCEPHALOGRAM REPORT     METHODOLOGY:  Electroencephalographic (EEG) recording is with electrodes placed according to the International 10-20 placement system.  Thirty two (32) channels of digital signal are simultaneously recorded from the scalp and may include EKG, EMG, and/or eye monitors.   Recording band pass was 0.1 to 512 hz.  Digital video recording of the patient is simultaneously recorded with the EEG.  The nursing staff report clinical symptoms and may press an event button when the patient has symptoms of clinical interest to the treating physicians.  EEG and video recording is stored and archived in digital format.  The entire recording is visually reviewed, and the times identified by computer analysis as being spikes or seizures are reviewed again.  Activation procedures which include photic stimulation, hyperventilation and instructing patients to perform simple task are done in selected patients.   Compresses spectral analysis (CSA) is also performed on the activity recorded from each individual channel.  This is displayed as a power display of frequencies from 0 to 30 Hz over time.   The CSA analysis is done and displayed continuously.  This is reviewed for asymmetries in power between homologous areas of the scalp and for presence of changes in power which can be seen when seizures occur.  Sections of suspected abnormalities on the CSA is then compared with the original EEG recording.                [employer] software was also utilized in the review of this study.  This software suite analyzes the EEG recording in multiple domains.  Coherence and rhythmicity is computed to identify EEG sections which may contain organized seizures.  Each channel undergoes analysis to detect presence of spike and sharp waves which have special and morphological  characteristic of epileptic activity.  The routine EEG recording is converted from spacial into frequency domain.  This is then displayed comparing homologous areas to identify areas of significant asymmetry.  Algorithm to identify non-cortically generated artifact is used to separate eye movement, EMG and other artifact from the EEG.     EEG FINDINGS:  The recording was obtained with a number of standard bipolar and referential montages during wakefulness, drowsiness and sleep.  In the alert state, the posterior background rhythm was a symmetric, well-modulated 9 Hz activity, which reacted symmetrically to eye opening.  Intermittent photic stimulation failed to evoke symmetric posterior driving responses. No abnormalities were activated by photic stimulation.  During drowsiness, the background rhythm waxed and waned and there were periods of slowing.  During stage II sleep, symmetric V waves and sleep spindles were noted. There were no interictal epileptiform abnormalities and no clinical or electrographic seizures were recorded.  Intermittent independent left and right temporal focal slowing was noted.    The EKG channel revealed a sinus rhythm.     IMPRESSION:  This is an abnormal EEG during wakefulness, drowsiness and sleep.Intermittent independent left and right temporal focal slowing was noted.     CLINICAL CORRELATION:  The patient is a 79-year-old female who presented with witnessed seizures.  The patient is currently maintained on gabapentin and Keppra.  This is an abnormal EEG during wakefulness, drowsiness and sleep.  The presence of independent left right temporal focal slowing suggestive focal neural dysfunction in these regions.  There is no evidence of an epileptic process on this recording.  No seizures recorded during this study.

## 2025-07-08 NOTE — PLAN OF CARE
Problem: Adult Inpatient Plan of Care  Goal: Plan of Care Review  Outcome: Met  Goal: Patient-Specific Goal (Individualized)  Outcome: Met  Goal: Absence of Hospital-Acquired Illness or Injury  Outcome: Met  Goal: Optimal Comfort and Wellbeing  Outcome: Met  Goal: Readiness for Transition of Care  Outcome: Met     Problem: Acute Kidney Injury/Impairment  Goal: Fluid and Electrolyte Balance  Outcome: Met  Goal: Improved Oral Intake  Outcome: Met  Goal: Effective Renal Function  Outcome: Met     Problem: Fall Injury Risk  Goal: Absence of Fall and Fall-Related Injury  Outcome: Met     Problem: Skin Injury Risk Increased  Goal: Skin Health and Integrity  Outcome: Met     Problem: Pain Acute  Goal: Optimal Pain Control and Function  Outcome: Met     Problem: Infection  Goal: Absence of Infection Signs and Symptoms  Outcome: Met

## 2025-07-08 NOTE — ASSESSMENT & PLAN NOTE
Anemia is likely due to chronic disease. Most recent hemoglobin and hematocrit are listed below.  Recent Labs     07/06/25  0454 07/07/25  0446 07/08/25  0458   HGB 9.8* 9.6* 9.2*   HCT 31.6* 32.1* 30.2*     Plan  - Monitor serial CBC: Daily  - Transfuse PRBC if patient becomes hemodynamically unstable, symptomatic or H/H drops below 7/21.  - Patient has not received any PRBC transfusions to date  - Patient's anemia is currently stable

## 2025-07-11 LAB — LABCORP MISCELLANEOUS TEST RESULT: NORMAL

## 2025-07-17 ENCOUNTER — TELEPHONE (OUTPATIENT)
Dept: FAMILY MEDICINE | Facility: CLINIC | Age: 80
End: 2025-07-17
Payer: MEDICARE

## 2025-07-25 ENCOUNTER — HOSPITAL ENCOUNTER (INPATIENT)
Facility: HOSPITAL | Age: 80
LOS: 2 days | Discharge: HOME | DRG: 391 | End: 2025-07-28
Attending: STUDENT IN AN ORGANIZED HEALTH CARE EDUCATION/TRAINING PROGRAM | Admitting: STUDENT IN AN ORGANIZED HEALTH CARE EDUCATION/TRAINING PROGRAM
Payer: MEDICARE

## 2025-07-25 DIAGNOSIS — K51.00 PANCOLITIS: ICD-10-CM

## 2025-07-25 DIAGNOSIS — M25.559 HIP PAIN: ICD-10-CM

## 2025-07-25 DIAGNOSIS — R11.0 NAUSEA: Primary | ICD-10-CM

## 2025-07-25 DIAGNOSIS — R07.9 CHEST PAIN: ICD-10-CM

## 2025-07-25 DIAGNOSIS — M25.552 LEFT HIP PAIN: ICD-10-CM

## 2025-07-25 LAB
ABSOLUTE EOSINOPHIL (SMH): 0 K/UL
ABSOLUTE MONOCYTE (SMH): 0.56 K/UL (ref 0.3–1)
ABSOLUTE NEUTROPHIL COUNT (SMH): 8.1 K/UL (ref 1.8–7.7)
ALBUMIN SERPL-MCNC: 3.2 G/DL (ref 3.5–5.2)
ALP SERPL-CCNC: 104 UNIT/L (ref 55–135)
ALT SERPL-CCNC: 6 UNIT/L (ref 10–44)
ANION GAP (SMH): 6 MMOL/L (ref 8–16)
AST SERPL-CCNC: 9 UNIT/L (ref 10–40)
BASOPHILS # BLD AUTO: 0.01 K/UL
BASOPHILS NFR BLD AUTO: 0.1 %
BILIRUB SERPL-MCNC: 0.4 MG/DL (ref 0.1–1)
BILIRUB UR QL STRIP.AUTO: NEGATIVE
BUN SERPL-MCNC: 9 MG/DL (ref 8–23)
C-REACTIVE PROTEIN (SMH): 27.6 MG/DL
CALCIUM SERPL-MCNC: 9.4 MG/DL (ref 8.7–10.5)
CHLORIDE SERPL-SCNC: 104 MMOL/L (ref 95–110)
CLARITY UR: CLEAR
CO2 SERPL-SCNC: 25 MMOL/L (ref 23–29)
COLOR UR AUTO: YELLOW
CREAT SERPL-MCNC: 0.5 MG/DL (ref 0.5–1.4)
CREAT SERPL-MCNC: 0.6 MG/DL (ref 0.5–1.4)
ERYTHROCYTE [DISTWIDTH] IN BLOOD BY AUTOMATED COUNT: 15.8 % (ref 11.5–14.5)
GFR SERPLBLD CREATININE-BSD FMLA CKD-EPI: >60 ML/MIN/1.73/M2
GLUCOSE SERPL-MCNC: 123 MG/DL (ref 70–110)
GLUCOSE UR QL STRIP: NEGATIVE
HCT VFR BLD AUTO: 28 % (ref 37–48.5)
HGB BLD-MCNC: 8.5 GM/DL (ref 12–16)
HGB UR QL STRIP: NEGATIVE
IMM GRANULOCYTES # BLD AUTO: 0.06 K/UL (ref 0–0.04)
IMM GRANULOCYTES NFR BLD AUTO: 0.6 % (ref 0–0.5)
KETONES UR QL STRIP: ABNORMAL
LEUKOCYTE ESTERASE UR QL STRIP: NEGATIVE
LIPASE SERPL-CCNC: <3 U/L (ref 4–60)
LYMPHOCYTES # BLD AUTO: 0.54 K/UL (ref 1–4.8)
MCH RBC QN AUTO: 24.1 PG (ref 27–31)
MCHC RBC AUTO-ENTMCNC: 30.4 G/DL (ref 32–36)
MCV RBC AUTO: 79 FL (ref 82–98)
MICROSCOPIC COMMENT: NORMAL
NITRITE UR QL STRIP: NEGATIVE
NUCLEATED RBC (/100WBC) (SMH): 0 /100 WBC
PH UR STRIP: 7 [PH]
PLATELET # BLD AUTO: 452 K/UL (ref 150–450)
PMV BLD AUTO: 9.7 FL (ref 9.2–12.9)
POTASSIUM SERPL-SCNC: 3.4 MMOL/L (ref 3.5–5.1)
PROT SERPL-MCNC: 6.7 GM/DL (ref 6–8.4)
PROT UR QL STRIP: ABNORMAL
RBC # BLD AUTO: 3.53 M/UL (ref 4–5.4)
RBC #/AREA URNS AUTO: 1 /HPF
RELATIVE EOSINOPHIL (SMH): 0 % (ref 0–8)
RELATIVE LYMPHOCYTE (SMH): 5.8 % (ref 18–48)
RELATIVE MONOCYTE (SMH): 6.1 % (ref 4–15)
RELATIVE NEUTROPHIL (SMH): 87.4 % (ref 38–73)
SAMPLE: NORMAL
SODIUM SERPL-SCNC: 135 MMOL/L (ref 136–145)
SP GR UR STRIP: >=1.03
UROBILINOGEN UR STRIP-ACNC: NEGATIVE EU/DL
WBC # BLD AUTO: 9.25 K/UL (ref 3.9–12.7)
WBC #/AREA URNS AUTO: 2 /HPF

## 2025-07-25 PROCEDURE — 96376 TX/PRO/DX INJ SAME DRUG ADON: CPT

## 2025-07-25 PROCEDURE — 83690 ASSAY OF LIPASE: CPT | Performed by: STUDENT IN AN ORGANIZED HEALTH CARE EDUCATION/TRAINING PROGRAM

## 2025-07-25 PROCEDURE — 25500020 PHARM REV CODE 255: Performed by: STUDENT IN AN ORGANIZED HEALTH CARE EDUCATION/TRAINING PROGRAM

## 2025-07-25 PROCEDURE — 81001 URINALYSIS AUTO W/SCOPE: CPT | Performed by: STUDENT IN AN ORGANIZED HEALTH CARE EDUCATION/TRAINING PROGRAM

## 2025-07-25 PROCEDURE — 99285 EMERGENCY DEPT VISIT HI MDM: CPT | Mod: 25

## 2025-07-25 PROCEDURE — 63600175 PHARM REV CODE 636 W HCPCS: Performed by: STUDENT IN AN ORGANIZED HEALTH CARE EDUCATION/TRAINING PROGRAM

## 2025-07-25 PROCEDURE — G0378 HOSPITAL OBSERVATION PER HR: HCPCS

## 2025-07-25 PROCEDURE — 96375 TX/PRO/DX INJ NEW DRUG ADDON: CPT

## 2025-07-25 PROCEDURE — 86140 C-REACTIVE PROTEIN: CPT | Performed by: STUDENT IN AN ORGANIZED HEALTH CARE EDUCATION/TRAINING PROGRAM

## 2025-07-25 PROCEDURE — 96374 THER/PROPH/DIAG INJ IV PUSH: CPT

## 2025-07-25 PROCEDURE — 82565 ASSAY OF CREATININE: CPT

## 2025-07-25 PROCEDURE — 80053 COMPREHEN METABOLIC PANEL: CPT | Performed by: STUDENT IN AN ORGANIZED HEALTH CARE EDUCATION/TRAINING PROGRAM

## 2025-07-25 PROCEDURE — 85025 COMPLETE CBC W/AUTO DIFF WBC: CPT | Performed by: STUDENT IN AN ORGANIZED HEALTH CARE EDUCATION/TRAINING PROGRAM

## 2025-07-25 RX ORDER — FAMOTIDINE 20 MG/1
20 TABLET, FILM COATED ORAL DAILY PRN
Status: DISCONTINUED | OUTPATIENT
Start: 2025-07-26 | End: 2025-07-26

## 2025-07-25 RX ORDER — ONDANSETRON HYDROCHLORIDE 2 MG/ML
4 INJECTION, SOLUTION INTRAVENOUS EVERY 6 HOURS PRN
Status: DISCONTINUED | OUTPATIENT
Start: 2025-07-25 | End: 2025-07-28 | Stop reason: HOSPADM

## 2025-07-25 RX ORDER — CEFTRIAXONE 1 G/1
1 INJECTION, POWDER, FOR SOLUTION INTRAMUSCULAR; INTRAVENOUS
Status: DISCONTINUED | OUTPATIENT
Start: 2025-07-26 | End: 2025-07-28 | Stop reason: HOSPADM

## 2025-07-25 RX ORDER — METRONIDAZOLE 500 MG/100ML
500 INJECTION, SOLUTION INTRAVENOUS
Status: DISCONTINUED | OUTPATIENT
Start: 2025-07-26 | End: 2025-07-28 | Stop reason: HOSPADM

## 2025-07-25 RX ORDER — AMLODIPINE BESYLATE 5 MG/1
5 TABLET ORAL DAILY
Status: DISCONTINUED | OUTPATIENT
Start: 2025-07-26 | End: 2025-07-28 | Stop reason: HOSPADM

## 2025-07-25 RX ORDER — ONDANSETRON HYDROCHLORIDE 2 MG/ML
4 INJECTION, SOLUTION INTRAVENOUS
Status: COMPLETED | OUTPATIENT
Start: 2025-07-25 | End: 2025-07-25

## 2025-07-25 RX ORDER — SODIUM,POTASSIUM PHOSPHATES 280-250MG
2 POWDER IN PACKET (EA) ORAL
Status: DISCONTINUED | OUTPATIENT
Start: 2025-07-25 | End: 2025-07-28 | Stop reason: HOSPADM

## 2025-07-25 RX ORDER — MORPHINE SULFATE 2 MG/ML
2 INJECTION, SOLUTION INTRAMUSCULAR; INTRAVENOUS
Refills: 0 | Status: COMPLETED | OUTPATIENT
Start: 2025-07-25 | End: 2025-07-25

## 2025-07-25 RX ORDER — ALUMINUM HYDROXIDE, MAGNESIUM HYDROXIDE, AND SIMETHICONE 1200; 120; 1200 MG/30ML; MG/30ML; MG/30ML
30 SUSPENSION ORAL 4 TIMES DAILY PRN
Status: DISCONTINUED | OUTPATIENT
Start: 2025-07-25 | End: 2025-07-28 | Stop reason: HOSPADM

## 2025-07-25 RX ORDER — HYDROCODONE BITARTRATE AND ACETAMINOPHEN 10; 325 MG/1; MG/1
1 TABLET ORAL EVERY 6 HOURS PRN
Refills: 0 | Status: DISCONTINUED | OUTPATIENT
Start: 2025-07-26 | End: 2025-07-26

## 2025-07-25 RX ORDER — NALOXONE HCL 0.4 MG/ML
0.02 VIAL (ML) INJECTION
Status: DISCONTINUED | OUTPATIENT
Start: 2025-07-25 | End: 2025-07-28 | Stop reason: HOSPADM

## 2025-07-25 RX ORDER — TALC
6 POWDER (GRAM) TOPICAL NIGHTLY PRN
Status: DISCONTINUED | OUTPATIENT
Start: 2025-07-25 | End: 2025-07-28 | Stop reason: HOSPADM

## 2025-07-25 RX ORDER — ENOXAPARIN SODIUM 100 MG/ML
40 INJECTION SUBCUTANEOUS EVERY 24 HOURS
Status: DISCONTINUED | OUTPATIENT
Start: 2025-07-26 | End: 2025-07-28 | Stop reason: HOSPADM

## 2025-07-25 RX ORDER — LEVETIRACETAM 500 MG/1
500 TABLET ORAL 2 TIMES DAILY
Status: DISCONTINUED | OUTPATIENT
Start: 2025-07-26 | End: 2025-07-28 | Stop reason: HOSPADM

## 2025-07-25 RX ORDER — ACETAMINOPHEN 325 MG/1
650 TABLET ORAL EVERY 4 HOURS PRN
Status: DISCONTINUED | OUTPATIENT
Start: 2025-07-25 | End: 2025-07-28 | Stop reason: HOSPADM

## 2025-07-25 RX ORDER — GLUCAGON 1 MG
1 KIT INJECTION
Status: DISCONTINUED | OUTPATIENT
Start: 2025-07-25 | End: 2025-07-28 | Stop reason: HOSPADM

## 2025-07-25 RX ORDER — SODIUM CHLORIDE 0.9 % (FLUSH) 0.9 %
10 SYRINGE (ML) INJECTION EVERY 12 HOURS PRN
Status: DISCONTINUED | OUTPATIENT
Start: 2025-07-25 | End: 2025-07-28 | Stop reason: HOSPADM

## 2025-07-25 RX ORDER — LANOLIN ALCOHOL/MO/W.PET/CERES
800 CREAM (GRAM) TOPICAL
Status: DISCONTINUED | OUTPATIENT
Start: 2025-07-25 | End: 2025-07-28 | Stop reason: HOSPADM

## 2025-07-25 RX ORDER — IBUPROFEN 200 MG
16 TABLET ORAL
Status: DISCONTINUED | OUTPATIENT
Start: 2025-07-25 | End: 2025-07-28 | Stop reason: HOSPADM

## 2025-07-25 RX ORDER — IBUPROFEN 200 MG
24 TABLET ORAL
Status: DISCONTINUED | OUTPATIENT
Start: 2025-07-25 | End: 2025-07-28 | Stop reason: HOSPADM

## 2025-07-25 RX ADMIN — IOHEXOL 100 ML: 350 INJECTION, SOLUTION INTRAVENOUS at 09:07

## 2025-07-25 RX ADMIN — ONDANSETRON 4 MG: 2 INJECTION INTRAMUSCULAR; INTRAVENOUS at 08:07

## 2025-07-25 RX ADMIN — MORPHINE SULFATE 2 MG: 2 INJECTION, SOLUTION INTRAMUSCULAR; INTRAVENOUS at 08:07

## 2025-07-25 RX ADMIN — MORPHINE SULFATE 2 MG: 2 INJECTION, SOLUTION INTRAMUSCULAR; INTRAVENOUS at 11:07

## 2025-07-25 RX ADMIN — MORPHINE SULFATE 2 MG: 2 INJECTION, SOLUTION INTRAMUSCULAR; INTRAVENOUS at 09:07

## 2025-07-26 PROBLEM — M25.552 LEFT HIP PAIN: Status: ACTIVE | Noted: 2025-07-26

## 2025-07-26 LAB
ABSOLUTE EOSINOPHIL (SMH): 0 K/UL
ABSOLUTE MONOCYTE (SMH): 0.76 K/UL (ref 0.3–1)
ABSOLUTE NEUTROPHIL COUNT (SMH): 6.6 K/UL (ref 1.8–7.7)
ALBUMIN SERPL-MCNC: 2.9 G/DL (ref 3.5–5.2)
ALP SERPL-CCNC: 95 UNIT/L (ref 55–135)
ALT SERPL-CCNC: 6 UNIT/L (ref 10–44)
ANION GAP (SMH): 11 MMOL/L (ref 8–16)
AST SERPL-CCNC: 9 UNIT/L (ref 10–40)
BASOPHILS # BLD AUTO: 0.01 K/UL
BASOPHILS NFR BLD AUTO: 0.1 %
BILIRUB SERPL-MCNC: 0.9 MG/DL (ref 0.1–1)
BUN SERPL-MCNC: 8 MG/DL (ref 8–23)
CALCIUM SERPL-MCNC: 8.8 MG/DL (ref 8.7–10.5)
CHLORIDE SERPL-SCNC: 103 MMOL/L (ref 95–110)
CO2 SERPL-SCNC: 23 MMOL/L (ref 23–29)
CREAT SERPL-MCNC: 0.5 MG/DL (ref 0.5–1.4)
ERYTHROCYTE [DISTWIDTH] IN BLOOD BY AUTOMATED COUNT: 15.9 % (ref 11.5–14.5)
GFR SERPLBLD CREATININE-BSD FMLA CKD-EPI: >60 ML/MIN/1.73/M2
GLUCOSE SERPL-MCNC: 110 MG/DL (ref 70–110)
HCT VFR BLD AUTO: 23.8 % (ref 37–48.5)
HGB BLD-MCNC: 7.5 GM/DL (ref 12–16)
IMM GRANULOCYTES # BLD AUTO: 0.03 K/UL (ref 0–0.04)
IMM GRANULOCYTES NFR BLD AUTO: 0.4 % (ref 0–0.5)
LYMPHOCYTES # BLD AUTO: 0.4 K/UL (ref 1–4.8)
MAGNESIUM SERPL-MCNC: 1.7 MG/DL (ref 1.6–2.6)
MCH RBC QN AUTO: 24.3 PG (ref 27–31)
MCHC RBC AUTO-ENTMCNC: 31.5 G/DL (ref 32–36)
MCV RBC AUTO: 77 FL (ref 82–98)
MRSA PCR SCRN (SMH): DETECTED
NUCLEATED RBC (/100WBC) (SMH): 0 /100 WBC
PHOSPHATE SERPL-MCNC: 2.3 MG/DL (ref 2.7–4.5)
PLATELET # BLD AUTO: 367 K/UL (ref 150–450)
PMV BLD AUTO: 10.1 FL (ref 9.2–12.9)
POTASSIUM SERPL-SCNC: 3 MMOL/L (ref 3.5–5.1)
PROT SERPL-MCNC: 5.8 GM/DL (ref 6–8.4)
RBC # BLD AUTO: 3.09 M/UL (ref 4–5.4)
RELATIVE EOSINOPHIL (SMH): 0 % (ref 0–8)
RELATIVE LYMPHOCYTE (SMH): 5.2 % (ref 18–48)
RELATIVE MONOCYTE (SMH): 9.8 % (ref 4–15)
RELATIVE NEUTROPHIL (SMH): 84.5 % (ref 38–73)
SODIUM SERPL-SCNC: 137 MMOL/L (ref 136–145)
WBC # BLD AUTO: 7.75 K/UL (ref 3.9–12.7)

## 2025-07-26 PROCEDURE — 63600175 PHARM REV CODE 636 W HCPCS

## 2025-07-26 PROCEDURE — 87507 IADNA-DNA/RNA PROBE TQ 12-25: CPT

## 2025-07-26 PROCEDURE — 84100 ASSAY OF PHOSPHORUS: CPT

## 2025-07-26 PROCEDURE — 83735 ASSAY OF MAGNESIUM: CPT

## 2025-07-26 PROCEDURE — 80053 COMPREHEN METABOLIC PANEL: CPT

## 2025-07-26 PROCEDURE — 97165 OT EVAL LOW COMPLEX 30 MIN: CPT

## 2025-07-26 PROCEDURE — 25000003 PHARM REV CODE 250: Performed by: STUDENT IN AN ORGANIZED HEALTH CARE EDUCATION/TRAINING PROGRAM

## 2025-07-26 PROCEDURE — 87449 NOS EACH ORGANISM AG IA: CPT

## 2025-07-26 PROCEDURE — 11000001 HC ACUTE MED/SURG PRIVATE ROOM

## 2025-07-26 PROCEDURE — 85025 COMPLETE CBC W/AUTO DIFF WBC: CPT

## 2025-07-26 PROCEDURE — 25000003 PHARM REV CODE 250

## 2025-07-26 PROCEDURE — 36415 COLL VENOUS BLD VENIPUNCTURE: CPT

## 2025-07-26 PROCEDURE — 87641 MR-STAPH DNA AMP PROBE: CPT | Performed by: STUDENT IN AN ORGANIZED HEALTH CARE EDUCATION/TRAINING PROGRAM

## 2025-07-26 PROCEDURE — 97530 THERAPEUTIC ACTIVITIES: CPT

## 2025-07-26 PROCEDURE — 97162 PT EVAL MOD COMPLEX 30 MIN: CPT

## 2025-07-26 PROCEDURE — 27000207 HC ISOLATION

## 2025-07-26 PROCEDURE — 63600175 PHARM REV CODE 636 W HCPCS: Performed by: STUDENT IN AN ORGANIZED HEALTH CARE EDUCATION/TRAINING PROGRAM

## 2025-07-26 RX ORDER — OXYCODONE AND ACETAMINOPHEN 10; 325 MG/1; MG/1
1 TABLET ORAL EVERY 4 HOURS PRN
Refills: 0 | Status: DISCONTINUED | OUTPATIENT
Start: 2025-07-26 | End: 2025-07-28 | Stop reason: HOSPADM

## 2025-07-26 RX ORDER — FAMOTIDINE 20 MG/1
20 TABLET, FILM COATED ORAL 2 TIMES DAILY PRN
Status: DISCONTINUED | OUTPATIENT
Start: 2025-07-26 | End: 2025-07-28 | Stop reason: HOSPADM

## 2025-07-26 RX ORDER — HYDROMORPHONE HYDROCHLORIDE 1 MG/ML
0.5 INJECTION, SOLUTION INTRAMUSCULAR; INTRAVENOUS; SUBCUTANEOUS EVERY 6 HOURS PRN
Status: DISCONTINUED | OUTPATIENT
Start: 2025-07-26 | End: 2025-07-28 | Stop reason: HOSPADM

## 2025-07-26 RX ORDER — DULOXETIN HYDROCHLORIDE 30 MG/1
30 CAPSULE, DELAYED RELEASE ORAL DAILY
COMMUNITY
Start: 2025-06-30

## 2025-07-26 RX ORDER — POTASSIUM CHLORIDE 20 MEQ/1
40 TABLET, EXTENDED RELEASE ORAL
Status: COMPLETED | OUTPATIENT
Start: 2025-07-26 | End: 2025-07-26

## 2025-07-26 RX ORDER — POTASSIUM CHLORIDE 7.45 MG/ML
10 INJECTION INTRAVENOUS
Status: DISCONTINUED | OUTPATIENT
Start: 2025-07-26 | End: 2025-07-26

## 2025-07-26 RX ORDER — DULOXETIN HYDROCHLORIDE 30 MG/1
30 CAPSULE, DELAYED RELEASE ORAL DAILY
Status: DISCONTINUED | OUTPATIENT
Start: 2025-07-26 | End: 2025-07-28 | Stop reason: HOSPADM

## 2025-07-26 RX ORDER — SODIUM CHLORIDE, SODIUM LACTATE, POTASSIUM CHLORIDE, CALCIUM CHLORIDE 600; 310; 30; 20 MG/100ML; MG/100ML; MG/100ML; MG/100ML
INJECTION, SOLUTION INTRAVENOUS CONTINUOUS
Status: DISCONTINUED | OUTPATIENT
Start: 2025-07-26 | End: 2025-07-26

## 2025-07-26 RX ORDER — LISINOPRIL 5 MG/1
10 TABLET ORAL DAILY
Status: DISCONTINUED | OUTPATIENT
Start: 2025-07-26 | End: 2025-07-28 | Stop reason: HOSPADM

## 2025-07-26 RX ADMIN — Medication 2 PACKET: at 11:07

## 2025-07-26 RX ADMIN — THERA TABS 1 TABLET: TAB at 09:07

## 2025-07-26 RX ADMIN — LEVETIRACETAM 500 MG: 500 TABLET, FILM COATED ORAL at 09:07

## 2025-07-26 RX ADMIN — AMLODIPINE BESYLATE 5 MG: 5 TABLET ORAL at 09:07

## 2025-07-26 RX ADMIN — VANCOMYCIN HYDROCHLORIDE 1250 MG: 1.25 INJECTION, POWDER, LYOPHILIZED, FOR SOLUTION INTRAVENOUS at 02:07

## 2025-07-26 RX ADMIN — PREGABALIN 200 MG: 75 CAPSULE ORAL at 10:07

## 2025-07-26 RX ADMIN — DULOXETINE 30 MG: 30 CAPSULE, DELAYED RELEASE ORAL at 02:07

## 2025-07-26 RX ADMIN — METRONIDAZOLE 500 MG: 5 INJECTION, SOLUTION INTRAVENOUS at 01:07

## 2025-07-26 RX ADMIN — OXYCODONE HYDROCHLORIDE AND ACETAMINOPHEN 1 TABLET: 10; 325 TABLET ORAL at 04:07

## 2025-07-26 RX ADMIN — POTASSIUM PHOSPHATE 15 MMOL: 236; 224 INJECTION, SOLUTION INTRAVENOUS at 08:07

## 2025-07-26 RX ADMIN — METRONIDAZOLE 500 MG: 5 INJECTION, SOLUTION INTRAVENOUS at 05:07

## 2025-07-26 RX ADMIN — CEFTRIAXONE 1 G: 1 INJECTION, POWDER, FOR SOLUTION INTRAMUSCULAR; INTRAVENOUS at 12:07

## 2025-07-26 RX ADMIN — LEVETIRACETAM 500 MG: 500 TABLET, FILM COATED ORAL at 08:07

## 2025-07-26 RX ADMIN — ENOXAPARIN SODIUM 40 MG: 40 INJECTION SUBCUTANEOUS at 04:07

## 2025-07-26 RX ADMIN — POTASSIUM CHLORIDE 40 MEQ: 1500 TABLET, EXTENDED RELEASE ORAL at 06:07

## 2025-07-26 RX ADMIN — METRONIDAZOLE 500 MG: 5 INJECTION, SOLUTION INTRAVENOUS at 09:07

## 2025-07-26 RX ADMIN — POTASSIUM CHLORIDE 40 MEQ: 1500 TABLET, EXTENDED RELEASE ORAL at 04:07

## 2025-07-26 RX ADMIN — HYDROCODONE BITARTRATE AND ACETAMINOPHEN 1 TABLET: 10; 325 TABLET ORAL at 01:07

## 2025-07-26 RX ADMIN — SODIUM CHLORIDE, POTASSIUM CHLORIDE, SODIUM LACTATE AND CALCIUM CHLORIDE: 600; 310; 30; 20 INJECTION, SOLUTION INTRAVENOUS at 05:07

## 2025-07-26 RX ADMIN — CEFTRIAXONE 1 G: 1 INJECTION, POWDER, FOR SOLUTION INTRAMUSCULAR; INTRAVENOUS at 11:07

## 2025-07-26 RX ADMIN — LISINOPRIL 10 MG: 5 TABLET ORAL at 02:07

## 2025-07-26 RX ADMIN — HYDROCODONE BITARTRATE AND ACETAMINOPHEN 1 TABLET: 10; 325 TABLET ORAL at 09:07

## 2025-07-26 RX ADMIN — PREGABALIN 200 MG: 75 CAPSULE ORAL at 08:07

## 2025-07-26 RX ADMIN — Medication 6 MG: at 01:07

## 2025-07-26 NOTE — ASSESSMENT & PLAN NOTE
- Patient presented to the ED with left hip pain  - Left hip xray shows no evidence of acute osseous process or hardware complication involving the pelvis or bilateral hips.  - CT left hip shows superior posterior migration of the head of the left proximal femoral prosthesis into the left ilium, a finding which is not significantly changed when compared to imaging from November 2024.  No evidence of periprosthetic fracture or hardware complication associated with the left proximal femoral prosthesis.   - Hx of MRSA arthritis of left hip  - ED provider worried about possible recurrence of septic arthritis  - Ortho consulted, appreciate recs  - NPO at midnight  - Empiric abx therapy with vancomycin  - PRN analgeisa  - Symptomatic care

## 2025-07-26 NOTE — CONSULTS
[facility]  Orthopedics  Consult Note    Patient Name: Froilan Ray  MRN: [id number]  Admission Date: 7/25/2025  Hospital Length of Stay: 0 days  Attending Provider: Maksim Blum MD  Primary Care Provider: Alphonse Boothe III, MD    Patient information was obtained from patient, past medical records, and ER records.     Inpatient consult to Orthopedics  Consult performed by: Konstantin Mae MD  Consult ordered by: Terri Singer, PAMARY        Subjective:     Principal Problem:Pancolitis    Chief Complaint:   Chief Complaint   Patient presents with    Fall     Slid from bed 3 days ago, complaining L leg pain, hx of long term antibx use for MRSA in the same leg        HPI: Cory is a 79 yr old female with a hx of chronic pain with opiod dependence, HTN, seizure disorder, PAF not on long term anticoag per home med list, CHF with preserved EF, left femur fracture s/p ORIF, neuropathy, diverticulosis, anemia, chronic gastric ulcer, osteoporosis, depression, staph arthritis of left hip, bacteremia, IBS who presented to the ED with a CC of fall and left hip pain. Patient is currently A&O x 3 to self, place, and month/year. She tells me that she slid out of her bed 3 days ago and caught herself before she actually fell. She denied hitting her head and doesn't think she hurt anything else, but since that time has been having left hip pain. She describes the pain as 7/10 intermittent throbbing pain worse with movement or laying on that side. She denies any radiation of her pain and states that she has been taking her home percocet with temporary relief of the pain. She also tells me that she has had multiple episodes of left hip pain similar to this in the past. She endorses diarrhea today as well. She denies tobacco and alcohol use. She denies fever, chills, SOB, CP, abdominal pain, nausea, vomiting, hematuria, dysuria, lightheadedness, dizziness, and syncope.     Patient had the left hip  with multiple different issues.  Including this was a Girdlestone procedure followed by a long cemented hemiarthroplasty with lifelong suppressive antibiotics.  Patient had a little bit of increase in pain with a fall recently.  CT scan shows small acetabulum fractures.    Past Medical History:   Diagnosis Date    Anemia     Arthritis     Bleeding ulcer 07/2016    Chronic pain     DDD (degenerative disc disease), cervical     DDD (degenerative disc disease), lumbar     Depression     Disc degeneration, lumbosacral     Diverticulitis     Encounter for blood transfusion     Hypertension     IBS (irritable bowel syndrome)     Neuropathy of both feet     Seizures     none  since 2017    Umbilical hernia 2020       Past Surgical History:   Procedure Laterality Date    ARTHROPLASTY, HIP, GIRDLESTONE, POSTERIOR APPROACH Left 1/19/2024    Procedure: ARTHROPLASTY, HIP, GIRDLESTONE, POSTERIOR APPROACH;  Surgeon: Bulmaro Tellez MD;  Location: [facility] OR [address] [place] [address];  Service: Orthopedics;  Laterality: Left;    ARTHROPLASTY, HIP, GIRDLESTONE, POSTERIOR APPROACH Left 1/25/2024    Procedure: Irrigation and debridement left hip,;  Surgeon: Juanito Painting MD;  [facility];  Service: Orthopedics;  Laterality: Left;    ARTHROPLASTY, HIP, GIRDLESTONE, POSTERIOR APPROACH Left 2/15/2024    Procedure: Revision hip antibiotic spacer head exchange, left, lateral, peg board, depuy osteomed in room, vanc, ancef, txa,;  Surgeon: Bulmaro Tellez MD;  Location: [facility] OR [address] [place] [address];  Service: Orthopedics;  Laterality: Left;    BACK SURGERY      BREAST BIOPSY      BREAST SURGERY Right     lumpectomy    CAUDAL EPIDURAL STEROID INJECTION N/A 01/28/2022    Procedure: Injection-steroid-epidural-caudal;  Surgeon: Luzmaria Marcelino MD;  Location: [facility] OR;  Service: Pain Management;  Laterality: N/A;    COLONOSCOPY N/A 01/14/2022    Procedure: COLONOSCOPY;  Surgeon: Abby Landers MD;  Location: [facility];  Service: Endoscopy;   Laterality: N/A;    COLONOSCOPY N/A 11/30/2024    Procedure: COLONOSCOPY;  Surgeon: Marcio Nguyễn III, MD;  Location: [facility] ENDO;  Service: Endoscopy;  Laterality: N/A;    ENDOSCOPIC ULTRASOUND OF UPPER GASTROINTESTINAL TRACT N/A 07/21/2020    Procedure: ULTRASOUND, UPPER GI TRACT, ENDOSCOPIC;  Surgeon: Marcio Nguyễn III, MD;  Location: [facility] ENDO;  Service: Endoscopy;  Laterality: N/A;    ENDOSCOPIC ULTRASOUND OF UPPER GASTROINTESTINAL TRACT N/A 9/27/2024    Procedure: ULTRASOUND, UPPER GI TRACT, ENDOSCOPIC;  Surgeon: Marcio Nguyễn III, MD;  Location: [facility] ENDO;  Service: Endoscopy;  Laterality: N/A;    ESOPHAGOGASTRODUODENOSCOPY N/A 01/14/2022    Procedure: EGD (ESOPHAGOGASTRODUODENOSCOPY);  Surgeon: Abby Landers MD;  Location: [facility] ENDO;  Service: Endoscopy;  Laterality: N/A;    ESOPHAGOGASTRODUODENOSCOPY N/A 06/10/2022    Procedure: EGD (ESOPHAGOGASTRODUODENOSCOPY);  Surgeon: Abby Landers MD;  Location: [facility] ENDO;  Service: Endoscopy;  Laterality: N/A;    EYE SURGERY      cataract    FLAP GRAFT, LOCAL Left 2/15/2024    Procedure: FLAP GRAFT, LOCAL;  Surgeon: Bulmaro Tellez MD;  Location: [facility] OR 2ND FLR;  Service: Orthopedics;  Laterality: Left;    HYSTERECTOMY      INTRAMEDULLARY RODDING OF TROCHANTER OF FEMUR Left 12/11/2018    Procedure: INSERTION, INTRAMEDULLARY SANTOS, FEMUR, TROCHANTER;  Surgeon: Eulalio De La Cruz MD;  Location: [facility] OR;  Service: Orthopedics;  Laterality: Left;    IRRIGATION AND DEBRIDEMENT OF LOWER EXTREMITY Left 1/19/2024    Procedure: IRRIGATION AND DEBRIDEMENT, LOWER EXTREMITY;  Surgeon: Bulmaro Tellez MD;  Location: [facility] OR 2ND FLR;  Service: Orthopedics;  Laterality: Left;    IRRIGATION AND DEBRIDEMENT OF LOWER EXTREMITY Left 2/15/2024    Procedure: IRRIGATION AND DEBRIDEMENT, LOWER EXTREMITY;  Surgeon: Bulmaro Tellez MD;  Location: [facility] OR 2ND FLR;  Service: Orthopedics;  Laterality: Left;    LAPAROSCOPIC CHOLECYSTECTOMY N/A 9/8/2024     Procedure: CHOLECYSTECTOMY, LAPAROSCOPIC;  Surgeon: Cipriano Ambrosio MD;  Location: [facility] OR;  Service: General;  Laterality: N/A;    REPAIR OF EPIGASTRIC HERNIA N/A 12/7/2023    Procedure: REPAIR, HERNIA, EPIGASTRIC;  Surgeon: Vipul Escobar MD;  Location: [facility] OR;  Service: General;  Laterality: N/A;    SPINAL CORD STIMULATOR IMPLANT  09/18/2013    and removal    SPINE SURGERY  2006    lumbar L2-S1 decompression.    SPINE SURGERY      cervical decompression    TONSILLECTOMY         Review of patient's allergies indicates:  No Known Allergies    Current Facility-Administered Medications   Medication    acetaminophen tablet 650 mg    aluminum-magnesium hydroxide-simethicone 200-200-20 mg/5 mL suspension 30 mL    amLODIPine tablet 5 mg    cefTRIAXone injection 1 g    dextrose 50% injection 12.5 g    dextrose 50% injection 25 g    DULoxetine DR capsule 30 mg    enoxaparin injection 40 mg    famotidine tablet 20 mg    glucagon (human recombinant) injection 1 mg    glucose chewable tablet 16 g    glucose chewable tablet 24 g    HYDROmorphone injection 0.5 mg    levETIRAcetam tablet 500 mg    lisinopriL tablet 10 mg    magnesium oxide tablet 800 mg    magnesium oxide tablet 800 mg    melatonin tablet 6 mg    metronidazole IVPB 500 mg    multivitamin tablet    naloxone 0.4 mg/mL injection 0.02 mg    ondansetron injection 4 mg    oxyCODONE-acetaminophen  mg per tablet 1 tablet    potassium bicarbonate disintegrating tablet 35 mEq    potassium bicarbonate disintegrating tablet 50 mEq    potassium bicarbonate disintegrating tablet 60 mEq    potassium chloride 10 mEq in 100 mL IVPB    potassium phosphate 15 mmol in D5W 250 mL infusion    potassium, sodium phosphates 280-160-250 mg packet 2 packet    potassium, sodium phosphates 280-160-250 mg packet 2 packet    potassium, sodium phosphates 280-160-250 mg packet 2 packet    pregabalin capsule 200 mg    sodium chloride 0.9% flush 10 mL     Family History        "Problem Relation (Age of Onset)    COPD Brother    Coronary artery disease Father    Depression Father    Diabetes Mother, Father, Sister, Brother    Heart disease Father    Hypertension Mother, Father    Irritable bowel syndrome Mother          Tobacco Use    Smoking status: Never    Smokeless tobacco: Never   Substance and Sexual Activity    Alcohol use: No    Drug use: No    Sexual activity: Not Currently     ROS  Objective:     Vital Signs (Most Recent):  Temp: 98.2 °F (36.8 °C) (07/26/25 1147)  Pulse: 86 (07/26/25 1147)  Resp: 18 (07/26/25 1147)  BP: (!) 145/78 (07/26/25 1147)  SpO2: (!) 94 % (07/26/25 1147) Vital Signs (24h Range):  Temp:  [98.2 °F (36.8 °C)-100 °F (37.8 °C)] 98.2 °F (36.8 °C)  Pulse:  [] 86  Resp:  [16-20] 18  SpO2:  [94 %-100 %] 94 %  BP: (141-172)/(70-92) 145/78     Weight: 62.1 kg (136 lb 14.5 oz)  Height: 5' 6" (167.6 cm)  Body mass index is 22.1 kg/m².      Intake/Output Summary (Last 24 hours) at 7/26/2025 1414  Last data filed at 7/26/2025 0931  Gross per 24 hour   Intake 342.42 ml   Output --   Net 342.42 ml       Ortho/SPM Exam    Significant Labs: ABGs: No results for input(s): "PH", "PCO2", "HCO3", "POCSATURATED", "BE" in the last 48 hours.  Blood Culture: No results for input(s): "LABBLOO" in the last 48 hours.  BMP:   Recent Labs   Lab 07/26/25  0547         K 3.0*      CO2 23   BUN 8   CREATININE 0.5   CALCIUM 8.8   MG 1.7     All pertinent labs within the past 24 hours have been reviewed.    Significant Imaging: CT: I have reviewed all pertinent results/findings and my personal findings are:  Left hip with a cemented femoral stem in place with slight acetabular fracture though there is no protrusio    Assessment/Plan:     Active Diagnoses:    Diagnosis Date Noted POA    PRINCIPAL PROBLEM:  Pancolitis [K51.00] 07/25/2025 Yes    Left hip pain [M25.552] 07/26/2025 Yes    Anemia [D64.9] 04/12/2025 Yes    Hypertension [I10] 08/06/2024 Yes    Hypophosphatemia " [E83.39] 02/17/2024 Yes    Chronic heart failure with preserved ejection fraction [I50.32] 01/16/2024 Yes    Hypokalemia [E87.6] 06/19/2022 Yes    Paroxysmal atrial fibrillation [I48.0] 06/16/2022 Yes    Seizure disorder [G40.909] 12/11/2018 Yes    Chronic pain with uncomplicated opioid dependence [F11.20] 08/27/2013 Yes     Chronic      Problems Resolved During this Admission:   79-year-old woman with a extensive history of the left hip with the current left hip cemented hemiarthroplasty that is meant to be placed with lifelong suppressive antibiotics.  Patient did have a fall with a slight acetabular fracture.  Going to keep her weightbearing as tolerated.  She will follow up with me in 2 weeks or her original surgeon.  She is okay to discharge home from orthopedic perspective.  Continue to work with physical therapy.    Please text or call with any questions  Konstantin Mae MD  Orthopedic Surgeon  [facility]  [phone]      Thank you for your consult. I will sign off. Please contact us if you have any additional questions.    Konstantin Mae MD  Orthopedics  [facility]

## 2025-07-26 NOTE — PROGRESS NOTES
VANCOMYCIN PHARMACOKINETIC NOTE:  Vancomycin Day # 1    Objective/Assessment:    Diagnosis/Indication for Vancomycin:Bone/Joint infection      79 y.o., female; Actual Body Weight = 62.1 kg (136 lb 14.5 oz).    The patient has the following labs:  7/26/2025 Estimated Creatinine Clearance: 88.2 mL/min (based on SCr of 0.5 mg/dL). Lab Results   Component Value Date    BUN 9 07/25/2025     Lab Results   Component Value Date    WBC 9.25 07/25/2025          Plan:  Adjust vancomycin dose and/or frequency based on the patient's actual weight and renal function:  Initiate Vancomycin 1000 mg IV every 12 hours following 1250 mg loading dose.  Orders have been entered into patient's chart.        Vancomycin trough level has been ordered for 1300 on 07/27.    Pharmacy will manage vancomycin therapy, monitor serum vancomycin levels, monitor renal function and adjust regimen as necessary.    Thank you for allowing us to participate in this patient's care.     Eliana Hernandez 7/26/2025   Department of Pharmacy  Ext [phone]

## 2025-07-26 NOTE — PT/OT/SLP EVAL
"Occupational Therapy   Evaluation    Name: Froilan Ray  MRN: [id number]  Admitting Diagnosis: Pancolitis  Recent Surgery: * No surgery found *      Recommendations:     Discharge Recommendations: Moderate Intensity Therapy  Discharge Equipment Recommendations:  to be determined by next level of care  Barriers to discharge:   (Increased assist with ADLs, IADLs, and mobility)    Assessment:     Froilan Ray is a 79 y.o. female with a medical diagnosis of Pancolitis.  Pt is agreeable to OT evaluation this AM.  Performance deficits affecting function: weakness, impaired endurance, impaired self care skills, impaired functional mobility, gait instability, impaired balance, decreased lower extremity function, decreased upper extremity function, decreased safety awareness, pain, decreased ROM.      Limited evaluation secondary to severe L hip pain. Pt only able to make it ~50 % of the way to EOB before screaming out in pain. MD notified.    Rehab Prognosis: Fair; patient would benefit from acute skilled OT services to address these deficits and reach maximum level of function.       Plan:     Patient to be seen 5 x/week to address the above listed problems via self-care/home management, therapeutic activities, therapeutic exercises  Plan of Care Expires: 08/26/25  Plan of Care Reviewed with: patient    Subjective     Chief Complaint: L hip pain  Patient/Family Comments/goals: none stated    Occupational Profile:  Living Environment: Pt lives with  and son in a [facility] with "more than 10 ALBERT"; has a WIS with a shower chair  Previous level of function: Mod I with ADLs prior to admission  Roles and Routines: wife; limited homemaker  Equipment Used at Home: cane, quad, rollator, wheelchair, shower chair, grab bar  Assistance upon Discharge: yes, from facility    Pain/Comfort:  Pain Rating 1:  (not rated)  Location - Side 1: Left  Location 1: hip  Pain Addressed 1: Reposition, Distraction, Cessation of Activity, " Nurse notified    Patients cultural, spiritual, Hinduism conflicts given the current situation: no    Objective:     Communicated with: nursing prior to session.  Patient found HOB elevated with telemetry, bed alarm, peripheral IV upon OT entry to room.    General Precautions: Standard, fall, special contact  Orthopedic Precautions: N/A  Braces: N/A  Respiratory Status: Room air    Occupational Performance:    Bed Mobility:    Patient completed Supine to Sit with moderate assistance; pt only able to make it ~50% of the way d/t severe left hip pain    Activities of Daily Living:  Grooming: set-up assistance to wash face bed level      Cognitive/Visual Perceptual:  Cognitive/Psychosocial Skills:     -       Oriented to: Person, Time, and Situation   -       Follows Commands/attention:Follows one-step commands  -       Communication: clear/fluent  -       Memory: deficits  -       Safety awareness/insight to disability: impaired   -       Mood/Affect/Coping skills/emotional control: Cooperative and Pleasant    Physical Exam:  Upper Extremity Range of Motion:     -       Right Upper Extremity: WFL  -       Left Upper Extremity: WFL  Upper Extremity Strength:    -       Right Upper Extremity: 3+/5  -       Left Upper Extremity: 3+/5   Strength:    -       Right Upper Extremity: fair   -       Left Upper Extremity: fair     AMPAC 6 Click ADL:  AMPAC Total Score: 15    Treatment & Education:  Pt educated on role of OT/POC, importance of OOB/EOB activity, use of call bell, and safety during ADLs, transfers, and functional mobility.    Patient left HOB elevated with all lines intact, call button in reach, bed alarm on, and MD and nursing notified    GOALS:   Multidisciplinary Problems       Occupational Therapy Goals          Problem: Occupational Therapy    Goal Priority Disciplines Outcome Interventions   Occupational Therapy Goal     OT, PT/OT     Description: Goals to be met by: 8/26/25     Patient will  increase functional independence with ADLs by performing:    UE Dressing with Supervision.  LE Dressing with Supervision and Assistive Devices as needed.  Grooming while seated at sink with Supervision.  Toileting from bedside commode with Supervision for hygiene and clothing management.   Toilet transfer to bedside commode with Supervision.                         DME Justifications:  No DME recommended requiring DME justifications    History:     Past Medical History:   Diagnosis Date    Anemia     Arthritis     Bleeding ulcer 07/2016    Chronic pain     DDD (degenerative disc disease), cervical     DDD (degenerative disc disease), lumbar     Depression     Disc degeneration, lumbosacral     Diverticulitis     Encounter for blood transfusion     Hypertension     IBS (irritable bowel syndrome)     Neuropathy of both feet     Seizures     none  since 2017    Umbilical hernia 2020         Past Surgical History:   Procedure Laterality Date    ARTHROPLASTY, HIP, GIRDLESTONE, POSTERIOR APPROACH Left 1/19/2024    Procedure: ARTHROPLASTY, HIP, GIRDLESTONE, POSTERIOR APPROACH;  Surgeon: Bulmaro Tellez MD;  Location: [facility] OR [address] [place] [address];  Service: Orthopedics;  Laterality: Left;    ARTHROPLASTY, HIP, GIRDLESTONE, POSTERIOR APPROACH Left 1/25/2024    Procedure: Irrigation and debridement left hip,;  Surgeon: Juanito Painting MD;  Location: [facility] OR [address] [place] [address];  Service: Orthopedics;  Laterality: Left;    ARTHROPLASTY, HIP, GIRDLESTONE, POSTERIOR APPROACH Left 2/15/2024    Procedure: Revision hip antibiotic spacer head exchange, left, lateral, peg board, depuy osteomed in room, vanc, ancef, txa,;  Surgeon: Bulmaro Tellez MD;  Location: [address];  Service: Orthopedics;  Laterality: Left;    BACK SURGERY      BREAST BIOPSY      BREAST SURGERY Right     lumpectomy    CAUDAL EPIDURAL STEROID INJECTION N/A 01/28/2022    Procedure: Injection-steroid-epidural-caudal;  Surgeon: Luzmaria Marcelino MD;  Location: [facility] OR;   Service: Pain Management;  Laterality: N/A;    COLONOSCOPY N/A 01/14/2022    Procedure: COLONOSCOPY;  Surgeon: Abby Landers MD;  Location: [facility] ENDO;  Service: Endoscopy;  Laterality: N/A;    COLONOSCOPY N/A 11/30/2024    Procedure: COLONOSCOPY;  Surgeon: Marcio Nguyễn III, MD;  Location: [facility] ENDO;  Service: Endoscopy;  Laterality: N/A;    ENDOSCOPIC ULTRASOUND OF UPPER GASTROINTESTINAL TRACT N/A 07/21/2020    Procedure: ULTRASOUND, UPPER GI TRACT, ENDOSCOPIC;  Surgeon: Marcio Nguyễn III, MD;  Location: [facility] ENDO;  Service: Endoscopy;  Laterality: N/A;    ENDOSCOPIC ULTRASOUND OF UPPER GASTROINTESTINAL TRACT N/A 9/27/2024    Procedure: ULTRASOUND, UPPER GI TRACT, ENDOSCOPIC;  Surgeon: Marcio Nguyễn III, MD;  Location: [facility] ENDO;  Service: Endoscopy;  Laterality: N/A;    ESOPHAGOGASTRODUODENOSCOPY N/A 01/14/2022    Procedure: EGD (ESOPHAGOGASTRODUODENOSCOPY);  Surgeon: Abby Landers MD;  Location: [facility] ENDO;  Service: Endoscopy;  Laterality: N/A;    ESOPHAGOGASTRODUODENOSCOPY N/A 06/10/2022    Procedure: EGD (ESOPHAGOGASTRODUODENOSCOPY);  Surgeon: Abby Landers MD;  Location: [facility] ENDO;  Service: Endoscopy;  Laterality: N/A;    EYE SURGERY      cataract    FLAP GRAFT, LOCAL Left 2/15/2024    Procedure: FLAP GRAFT, LOCAL;  Surgeon: Bulmaro Tellez MD;  Location: [facility] OR 2ND FLR;  Service: Orthopedics;  Laterality: Left;    HYSTERECTOMY      INTRAMEDULLARY RODDING OF TROCHANTER OF FEMUR Left 12/11/2018    Procedure: INSERTION, INTRAMEDULLARY SANTOS, FEMUR, TROCHANTER;  Surgeon: Eulalio De La Cruz MD;  Location: [facility];  Service: Orthopedics;  Laterality: Left;    IRRIGATION AND DEBRIDEMENT OF LOWER EXTREMITY Left 1/19/2024    Procedure: IRRIGATION AND DEBRIDEMENT, LOWER EXTREMITY;  Surgeon: Bulmaro Tellez MD;  Location: [facility] OR 2ND FLR;  Service: Orthopedics;  Laterality: Left;    IRRIGATION AND DEBRIDEMENT OF LOWER EXTREMITY Left 2/15/2024    Procedure: IRRIGATION AND  DEBRIDEMENT, LOWER EXTREMITY;  Surgeon: Bulmaro Tellez MD;  Location: [facility] OR [facility] FLR;  Service: Orthopedics;  Laterality: Left;    LAPAROSCOPIC CHOLECYSTECTOMY N/A 9/8/2024    Procedure: CHOLECYSTECTOMY, LAPAROSCOPIC;  Surgeon: Cipriano Ambrosio MD;  Location: [facility] OR;  Service: General;  Laterality: N/A;    REPAIR OF EPIGASTRIC HERNIA N/A 12/7/2023    Procedure: REPAIR, HERNIA, EPIGASTRIC;  Surgeon: Vipul Escobar MD;  Location: [facility];  Service: General;  Laterality: N/A;    SPINAL CORD STIMULATOR IMPLANT  09/18/2013    and removal    SPINE SURGERY  2006    lumbar L2-S1 decompression.    SPINE SURGERY      cervical decompression    TONSILLECTOMY         Time Tracking:     OT Date of Treatment: 07/26/25  OT Start Time: 0841  OT Stop Time: 0855  OT Total Time (min): 14 min    Billable Minutes:Evaluation 14    7/26/2025

## 2025-07-26 NOTE — PHARMACY MED REC
"Admission Medication History     The home medication history was taken by Talib Fernández.    You may go to "Admission" then "Reconcile Home Medications" tabs to review and/or act upon these items.     The home medication list has been updated by the Pharmacy department.   Please read ALL comments highlighted in yellow.   Please address this information as you see fit.    Feel free to contact us if you have any questions or require assistance.      The medications listed below were removed from the home medication list. Please reorder if appropriate:  Patient reports no longer taking the following medication(s):  Pantoprazole 40 mg    Medications listed below were obtained from: Patient/family and Analytic software- [email or web address]  No current facility-administered medications on file prior to encounter.     Current Outpatient Medications on File Prior to Encounter   Medication Sig Dispense Refill    acetaminophen (TYLENOL) 325 MG tablet Take 2 tablets (650 mg total) by mouth every 8 (eight) hours as needed for Pain (pain scale 1-4).  0    amlodipine-benazepril 5-20 mg (LOTREL) 5-20 mg per capsule Take 1 capsule by mouth once daily. 90 capsule 3    cyclobenzaprine (FLEXERIL) 10 MG tablet Take 1 tablet (10 mg total) by mouth 3 (three) times daily as needed for Muscle spasms.      docusate sodium (COLACE) 100 MG capsule Take 1 capsule (100 mg total) by mouth 2 (two) times daily. (Patient taking differently: Take 100 mg by mouth 2 (two) times daily as needed for Constipation.) 60 capsule 1    DULoxetine (CYMBALTA) 30 MG capsule Take 30 mg by mouth once daily.      famotidine (PEPCID) 20 MG tablet Take 20 mg by mouth daily as needed for Heartburn.      levETIRAcetam (KEPPRA) 500 MG Tab Take 1 tablet (500 mg total) by mouth 2 (two) times daily. 180 tablet 0    multivitamin with minerals tablet Take 1 tablet by mouth once daily.      oxyCODONE-acetaminophen (PERCOCET)  mg per tablet Take 1 tablet by mouth every 4 (four) " hours as needed for Pain. 12 tablet 0    pregabalin (LYRICA) 200 MG Cap Take 1 capsule (200 mg total) by mouth 2 (two) times daily. 90 capsule 0    ondansetron (ZOFRAN-ODT) 4 MG TbDL Take 1 tablet (4 mg total) by mouth every 8 (eight) hours as needed (nausea, vomiting). (Patient not taking: Reported on 7/26/2025) 12 tablet 0    [DISCONTINUED] pantoprazole (PROTONIX) 40 MG tablet Take 1 tablet (40 mg total) by mouth once daily. (Patient not taking: Reported on 7/26/2025) 30 tablet 0         Talib Fernández  EXT 1919                .

## 2025-07-26 NOTE — PT/OT/SLP EVAL
"Physical Therapy Evaluation    Patient Name:  Froilan Ray   MRN:  [id number]    Recommendations:     Discharge Recommendations: Moderate Intensity Therapy   Discharge Equipment Recommendations: to be determined by next level of care   Barriers to discharge: Decreased caregiver support and pt currently required assistance of 2 persons for safety with mobility    Assessment:     Froilan Ray is a 79 y.o. female admitted with a medical diagnosis of Pancolitis.  She presents with the following impairments/functional limitations: weakness, impaired endurance, impaired self care skills, impaired functional mobility, gait instability, impaired balance, decreased safety awareness, decreased lower extremity function, pain, orthopedic precautions. Pt found HOB elevated with spouse present in pm and pt agreeable to working with PT/asking to get oob to chair. Pt A & O x  4 and has the following co-morbidities: Sz Disorder, A-Fib, HF, HTN, Hx L femur fx with ORIF.  Pt tolerated session fairly and required minimum to moderate assistance of 1-2  for safe mobilization during session today. Pt would benefit from acute PT during hospitalization to increase strength, endurance and safety with mobility and would benefit from Moderate Intensity Therapy prior to discharge home.      Rehab Prognosis: Fair; patient would benefit from acute skilled PT services to address these deficits and reach maximum level of function.    Recent Surgery: * No surgery found *      Plan:     During this hospitalization, patient to be seen daily to address the identified rehab impairments via gait training, therapeutic activities, therapeutic exercises, neuromuscular re-education and progress toward the following goals:    Plan of Care Expires:  08/23/25    Subjective     Chief Complaint: pt denied pain during session stating "the medicine she gave me has helped."  Patient/Family Comments/goals: Return to prior level of functional " mobility.    Pain/Comfort:  Pain Rating 1:  (pt denied pain during session)  Location - Side 1: Left  Location 1: hip  Pain Addressed 1: Reposition, Pre-medicate for activity, Distraction    Patients cultural, spiritual, Druze conflicts given the current situation:      Living Environment:  Pt lives with her spouse in a H  Prior to admission, patients level of function was required assistance with all mobility.  Equipment used at home: cane, quad, rollator, wheelchair, shower chair.  DME owned (not currently used): none.  Upon discharge, patient will have assistance from facility staff and her spouse.    Objective:     Communicated with HEATHER Pimentel prior to and during session.  Patient found HOB elevated with bed alarm, peripheral IV, telemetry  upon PT entry to room.    General Precautions: Standard, fall, seizure, special contact, hearing impaired  Orthopedic Precautions:LLE weight bearing as tolerated   Braces: N/A  Respiratory Status: Room air    Exams:  Cognitive Exam:  Patient is oriented to Person, Place, Time, and Situation  RLE ROM: WFL  RLE Strength: NT  LLE ROM: WFL  LLE Strength: NT    Functional Mobility:  Bed Mobility:     Scooting: contact guard assistance and minimum assistance  Supine to Sit: contact guard assistance and minimum assistance  Transfers:     Sit to Stand:  moderate assistance and of 1-2 persons with rolling walker  Bed to Chair: moderate assistance and of 1-2 persons with  rolling walker and vc for upright posture/head erect  using  Step Transfer with extra time needed to complete task.      AM-PAC 6 CLICK MOBILITY  Total Score:13       Treatment & Education:  Pt was educated on the following: call light use, importance of OOB activity and functional mobility to negate the negative effects of prolonged bed rest during this hospitalization, safe transfers/ambulation and discharge planning recommendations/options.      Patient left up in chair with all lines intact, call button in  reach, chair alarm on, and RN notified and pt's spouse present.    GOALS:   Multidisciplinary Problems       Physical Therapy Goals          Problem: Physical Therapy    Goal Priority Disciplines Outcome Interventions   Physical Therapy Goal     PT, PT/OT     Description: Goals to be met by: 25     Patient will increase functional independence with mobility by performin. Supine to sit with Supervision  2. Sit to stand transfer with Minimal Assistance  3. Bed to chair transfer with Minimal Assistance using Rolling Walker  4. Gait  x 50 feet with Minimal Assistance using Rolling Walker.                             DME Justifications:  No DME recommended requiring DME justifications    History:     Past Medical History:   Diagnosis Date    Anemia     Arthritis     Bleeding ulcer 2016    Chronic pain     DDD (degenerative disc disease), cervical     DDD (degenerative disc disease), lumbar     Depression     Disc degeneration, lumbosacral     Diverticulitis     Encounter for blood transfusion     Hypertension     IBS (irritable bowel syndrome)     Neuropathy of both feet     Seizures     none  since     Umbilical hernia        Past Surgical History:   Procedure Laterality Date    ARTHROPLASTY, HIP, GIRDLESTONE, POSTERIOR APPROACH Left 2024    Procedure: ARTHROPLASTY, HIP, GIRDLESTONE, POSTERIOR APPROACH;  Surgeon: Bulmaro Tellez MD;  Location: [facility] OR [address] [place] [address];  Service: Orthopedics;  Laterality: Left;    ARTHROPLASTY, HIP, GIRDLESTONE, POSTERIOR APPROACH Left 2024    Procedure: Irrigation and debridement left hip,;  Surgeon: Juanito Painting MD;  Location: [facility] OR [address] [place] [address];  Service: Orthopedics;  Laterality: Left;    ARTHROPLASTY, HIP, GIRDLESTONE, POSTERIOR APPROACH Left 2/15/2024    Procedure: Revision hip antibiotic spacer head exchange, left, lateral, peg board, depuy osteomed in room, vanc, ancef, txa,;  Surgeon: Bulmaro Tellez MD;  Location: [facility] OR [address] [place] [address];   Service: Orthopedics;  Laterality: Left;    BACK SURGERY      BREAST BIOPSY      BREAST SURGERY Right     lumpectomy    CAUDAL EPIDURAL STEROID INJECTION N/A 01/28/2022    Procedure: Injection-steroid-epidural-caudal;  Surgeon: Luzmaria Marcelino MD;  Location: [facility] OR;  Service: Pain Management;  Laterality: N/A;    COLONOSCOPY N/A 01/14/2022    Procedure: COLONOSCOPY;  Surgeon: Abby Landers MD;  Location: [facility] ENDO;  Service: Endoscopy;  Laterality: N/A;    COLONOSCOPY N/A 11/30/2024    Procedure: COLONOSCOPY;  Surgeon: Marcio Nguyễn III, MD;  Location: [facility] ENDO;  Service: Endoscopy;  Laterality: N/A;    ENDOSCOPIC ULTRASOUND OF UPPER GASTROINTESTINAL TRACT N/A 07/21/2020    Procedure: ULTRASOUND, UPPER GI TRACT, ENDOSCOPIC;  Surgeon: Marcio Nguyễn III, MD;  Location: [facility] ENDO;  Service: Endoscopy;  Laterality: N/A;    ENDOSCOPIC ULTRASOUND OF UPPER GASTROINTESTINAL TRACT N/A 9/27/2024    Procedure: ULTRASOUND, UPPER GI TRACT, ENDOSCOPIC;  Surgeon: Marcio Nguyễn III, MD;  Location: [facility];  Service: Endoscopy;  Laterality: N/A;    ESOPHAGOGASTRODUODENOSCOPY N/A 01/14/2022    Procedure: EGD (ESOPHAGOGASTRODUODENOSCOPY);  Surgeon: Abby Landers MD;  Location: [facility] ENDO;  Service: Endoscopy;  Laterality: N/A;    ESOPHAGOGASTRODUODENOSCOPY N/A 06/10/2022    Procedure: EGD (ESOPHAGOGASTRODUODENOSCOPY);  Surgeon: Abby Landers MD;  Location: [facility];  Service: Endoscopy;  Laterality: N/A;    EYE SURGERY      cataract    FLAP GRAFT, LOCAL Left 2/15/2024    Procedure: FLAP GRAFT, LOCAL;  Surgeon: Bulmaro Tellez MD;  [facility];  Service: Orthopedics;  Laterality: Left;    HYSTERECTOMY      INTRAMEDULLARY RODDING OF TROCHANTER OF FEMUR Left 12/11/2018    Procedure: INSERTION, INTRAMEDULLARY SANTOS, FEMUR, TROCHANTER;  Surgeon: Eulalio De La Cruz MD;  Location: [facility] OR;  Service: Orthopedics;  Laterality: Left;    IRRIGATION AND DEBRIDEMENT OF LOWER EXTREMITY Left  1/19/2024    Procedure: IRRIGATION AND DEBRIDEMENT, LOWER EXTREMITY;  Surgeon: Bulmaro Tellez MD;  Location: [facility] OR 2ND FLR;  Service: Orthopedics;  Laterality: Left;    IRRIGATION AND DEBRIDEMENT OF LOWER EXTREMITY Left 2/15/2024    Procedure: IRRIGATION AND DEBRIDEMENT, LOWER EXTREMITY;  Surgeon: Bulmaro Tellez MD;  Location: [facility] OR 2ND FLR;  Service: Orthopedics;  Laterality: Left;    LAPAROSCOPIC CHOLECYSTECTOMY N/A 9/8/2024    Procedure: CHOLECYSTECTOMY, LAPAROSCOPIC;  Surgeon: Cipriano Ambrosio MD;  Location: [facility];  Service: General;  Laterality: N/A;    REPAIR OF EPIGASTRIC HERNIA N/A 12/7/2023    Procedure: REPAIR, HERNIA, EPIGASTRIC;  Surgeon: Vipul Escobar MD;  Location: [facility];  Service: General;  Laterality: N/A;    SPINAL CORD STIMULATOR IMPLANT  09/18/2013    and removal    SPINE SURGERY  2006    lumbar L2-S1 decompression.    SPINE SURGERY      cervical decompression    TONSILLECTOMY         Time Tracking:     PT Received On: 07/26/25  PT Start Time: 1319     PT Stop Time: 1347  PT Total Time (min): 28 min     Billable Minutes: Evaluation 10 and Therapeutic Activity 18      07/26/2025

## 2025-07-26 NOTE — ASSESSMENT & PLAN NOTE
"Patient has Diastolic (HFpEF) heart failure that is Chronic. On presentation their CHF was well compensated. Most recent BNP and echo results are listed below.  No results for input(s): "BNP" in the last 72 hours.  Latest ECHO  Results for orders placed during the hospital encounter of 01/17/24    Echo    Interpretation Summary    Left Ventricle: The left ventricle is normal in size. Normal wall thickness. There is concentric remodeling. Normal wall motion. There is low normal systolic function with a visually estimated ejection fraction of 50 - 55%. There is normal diastolic function.    Right Ventricle: Normal right ventricular cavity size. Wall thickness is normal. Right ventricle wall motion  is normal. Systolic function is normal.    Aortic Valve: There is moderate aortic valve sclerosis. There is moderate annular calcification present.    Mitral Valve: There is severe mitral annular calcification present.    Aorta: Aortic root is normal in size measuring 3.37 cm. Ascending aorta is normal measuring 3.51 cm.    Pulmonary Artery: The estimated pulmonary artery systolic pressure is 25 mmHg.    IVC/SVC: Normal venous pressure at 3 mmHg.    Current Heart Failure Medications       Plan  - Monitor strict I&Os and daily weights.    - Place on telemetry  - Low sodium diet  - Place on fluid restriction of 1.5 L.   - Cardiology has not been consulted  - The patient's volume status is at their baseline  "

## 2025-07-26 NOTE — ASSESSMENT & PLAN NOTE
Patient has paroxysmal (<7 days) atrial fibrillation. Patient is currently in sinus rhythm. QINUC2DWMo Score: 3. The patients heart rate in the last 24 hours is as follows:  Pulse  Min: 89  Max: 97     Antiarrhythmics       Anticoagulants  enoxaparin injection 40 mg, Every 24 hours, Subcutaneous    Plan  - Replete lytes with a goal of K>4, Mg >2  - Patient is not anticoagulated due to not on long term anticoag per home med list  - Patient's afib is currently controlled

## 2025-07-26 NOTE — ASSESSMENT & PLAN NOTE
Chronic, controlled.  Latest blood pressure and vitals reviewed-     Temp:  [98.5 °F (36.9 °C)]   Pulse:  [89-97]   Resp:  [16]   BP: (141-172)/(70-83)   SpO2:  [96 %-100 %] .   Home meds for hypertension were reviewed and noted below-  Hypertension Medications              amlodipine-benazepril 5-20 mg (LOTREL) 5-20 mg per capsule Take 1 capsule by mouth once daily.            While in the hospital, will manage blood pressure as follows; Adjust home antihypertensive regimen as follows- Continue home amlodipine with parameters. Do not have benazepril on formulary, will hold for now    Will utilize p.r.n. blood pressure medication only if patient's blood pressure greater than 180/110 and she develops symptoms such as worsening chest pain or shortness of breath.

## 2025-07-26 NOTE — PLAN OF CARE
Froilan Ray has warranted treatment spanning two or more midnights of hospital level care for the management of Pancolitis,Abdominal and Hip pain. She continues to require IV antibiotics, daily labs, further testing/imaging, monitoring of vital signs, medication adjustments, and further evaluation by consultants. Her condition is also complicated by the following comorbidities: Hypertension.Seizure Disorder,CHF,A-Fib,Chronic pain.

## 2025-07-26 NOTE — ASSESSMENT & PLAN NOTE
Chronic.  Mostly controlled.  Takes amlodipine-benazepril at home.  -continue amlodipine  -add lisinopril as formulary equivalent

## 2025-07-26 NOTE — ASSESSMENT & PLAN NOTE
Controlled not on rate or rhythm control meds.  Not on anticoagulants either.  -telemetry monitoring

## 2025-07-26 NOTE — PLAN OF CARE
Goals to be met by: 25     Patient will increase functional independence with mobility by performin. Supine to sit with Supervision  2. Sit to stand transfer with Minimal Assistance  3. Bed to chair transfer with Minimal Assistance using Rolling Walker  4. Gait  x 50 feet with Minimal Assistance using Rolling Walker.

## 2025-07-26 NOTE — PLAN OF CARE
Problem: Hospitalized Older Adult  Goal: Optimal Cognitive Function  Outcome: Progressing  Goal: Effective Bowel Elimination  Outcome: Progressing  Goal: Optimal Coping  Outcome: Progressing  Goal: Fluid and Electrolyte Balance  Outcome: Progressing  Goal: Optimal Functional Ability  Outcome: Progressing  Goal: Improved Oral Intake  Outcome: Progressing  Goal: Adequate Sleep/Rest  Outcome: Progressing  Goal: Effective Urinary Elimination  Outcome: Progressing     Problem: Adult Inpatient Plan of Care  Goal: Plan of Care Review  Outcome: Progressing  Goal: Patient-Specific Goal (Individualized)  Outcome: Progressing  Goal: Absence of Hospital-Acquired Illness or Injury  Outcome: Progressing  Goal: Optimal Comfort and Wellbeing  Outcome: Progressing  Goal: Readiness for Transition of Care  Outcome: Progressing     Problem: Acute Kidney Injury/Impairment  Goal: Fluid and Electrolyte Balance  Outcome: Progressing  Goal: Improved Oral Intake  Outcome: Progressing  Goal: Effective Renal Function  Outcome: Progressing     Problem: Infection  Goal: Absence of Infection Signs and Symptoms  Outcome: Progressing     Problem: Skin Injury Risk Increased  Goal: Skin Health and Integrity  Outcome: Progressing     Problem: Wound  Goal: Optimal Coping  Outcome: Progressing  Goal: Optimal Functional Ability  Outcome: Progressing  Goal: Absence of Infection Signs and Symptoms  Outcome: Progressing  Goal: Improved Oral Intake  Outcome: Progressing  Goal: Optimal Pain Control and Function  Outcome: Progressing  Goal: Skin Health and Integrity  Outcome: Progressing  Goal: Optimal Wound Healing  Outcome: Progressing     Problem: Fall Injury Risk  Goal: Absence of Fall and Fall-Related Injury  Outcome: Progressing

## 2025-07-26 NOTE — ASSESSMENT & PLAN NOTE
Symptoms improving  CTAP performed in the ED with pancolitis   - GI consulted, patient declined colonoscopy, recommended antibiotic therapy for 7 days and advance diet as tolerated  - trial clear liquids   - C Diff and GI PCR pending  - Empiric therapy with ceftriaxone and flagyl  - discontinue vancomycin  - PRN pain meds

## 2025-07-26 NOTE — PROGRESS NOTES
[facility] Medicine  Progress Note    Patient Name: Froilan Ray  MRN: [id number]  Patient Class: OP- Observation   Admission Date: 7/25/2025  Length of Stay: 0 days  Attending Physician: Maksim Blum MD  Primary Care Provider: Alphonse Boothe III, MD        Subjective     Principal Problem:Pancolitis        HPI:  Ms. Ray is a 79 yr old female with a hx of chronic pain with opiod dependence, HTN, seizure disorder, PAF not on long term anticoag per home med list, CHF with preserved EF, left femur fracture s/p ORIF, neuropathy, diverticulosis, anemia, chronic gastric ulcer, osteoporosis, depression, staph arthritis of left hip, bacteremia, IBS who presented to the ED with a CC of fall and left hip pain. Patient is currently A&O x 3 to self, place, and month/year. She tells me that she slid out of her bed 3 days ago and caught herself before she actually fell. She denied hitting her head and doesn't think she hurt anything else, but since that time has been having left hip pain. She describes the pain as 7/10 intermittent throbbing pain worse with movement or laying on that side. She denies any radiation of her pain and states that she has been taking her home percocet with temporary relief of the pain. She also tells me that she has had multiple episodes of left hip pain similar to this in the past. She endorses diarrhea today as well. She denies tobacco and alcohol use. She denies fever, chills, SOB, CP, abdominal pain, nausea, vomiting, hematuria, dysuria, lightheadedness, dizziness, and syncope.     Upon arrival to ED, patient afebrile, HR of 96, RR of 16, BP of 148/83, satting 96% on RA. Workup in the ED revealed RBC of 3.53, H/H of 8.5/28, platelets of 452, sodium of 135, potassium of 3.4, glucose of 123, albumin of 3.2, CRP of 27.6. Remainder of labs fairly unremarkable. Left hip xray shows no evidence of acute osseous process or hardware complication involving the pelvis  or bilateral hips. CT left hip shows superior posterior migration of the head of the left proximal femoral prosthesis into the left ilium, a finding which is not significantly changed when compared to imaging from November 2024.  No evidence of periprosthetic fracture or hardware complication associated with the left proximal femoral prosthesis. CT abdomen/pelvis shows pancolitis. Patient was given morphine 2 mg IV x3 and ondansetron 4 mg IV while in the ED. Case discussed with ED provider and patient will be placed under observation for further management.    Overview/Hospital Course:  79-year-old female with PMH per HPI who presents after a fall with acute on chronic left hip pain and diarrhea with abdominal pains.  CT of left hip showed superior posterior migration of the head of the left proximal femoral prosthesis into the left ilium which is not significantly changed from prior imaging in 2024.  CT abdomen and pelvis showed pancolitis.  Given IV antibiotics.  Pending stool studies.  Consults to both Orthopedic surgery and Gastroenterology.  Patient declined colonoscopy.    Interval History:  Seen on a.m. rounds.  Having moderate to severe pain to the left hip.  Denies anymore diarrhea.  Abdominal pain minimal      Objective:     Vital Signs (Most Recent):  Temp: 98.2 °F (36.8 °C) (07/26/25 1147)  Pulse: 86 (07/26/25 1147)  Resp: 18 (07/26/25 1147)  BP: (!) 145/78 (07/26/25 1147)  SpO2: (!) 94 % (07/26/25 1147) Vital Signs (24h Range):  Temp:  [98.2 °F (36.8 °C)-100 °F (37.8 °C)] 98.2 °F (36.8 °C)  Pulse:  [] 86  Resp:  [16-20] 18  SpO2:  [94 %-100 %] 94 %  BP: (141-172)/(70-92) 145/78     Weight: 62.1 kg (136 lb 14.5 oz)  Body mass index is 22.1 kg/m².    Intake/Output Summary (Last 24 hours) at 7/26/2025 1313  Last data filed at 7/26/2025 0931  Gross per 24 hour   Intake 342.42 ml   Output --   Net 342.42 ml         Physical Exam  Vitals reviewed.   Constitutional:       General: She is awake. She is  not in acute distress.     Appearance: Normal appearance.   Cardiovascular:      Rate and Rhythm: Normal rate and regular rhythm.   Pulmonary:      Effort: Pulmonary effort is normal. No accessory muscle usage or respiratory distress.   Abdominal:      General: Abdomen is flat.      Palpations: Abdomen is soft.      Tenderness: There is generalized abdominal tenderness (Minimal lower quadrants). There is no guarding.   Musculoskeletal:      Right lower leg: No edema.      Left lower leg: No edema.      Comments: Left hip with significant tenderness to palpation on exam and worse with ROM at hip joint. No overlying skin changes or deformities appreciated. ROM decreased 2/2 pain.   Skin:     General: Skin is warm and dry.   Neurological:      General: No focal deficit present.      Mental Status: She is alert and oriented to person, place, and time. Mental status is at baseline.   Psychiatric:         Behavior: Behavior is cooperative.               Significant Labs: All pertinent labs within the past 24 hours have been reviewed.    Significant Imaging: I have reviewed all pertinent imaging results/findings within the past 24 hours.      Assessment & Plan  Pancolitis  Symptoms improving  CTAP performed in the ED with pancolitis   - GI consulted, patient declined colonoscopy, recommended antibiotic therapy for 7 days and advance diet as tolerated  - trial clear liquids   - C Diff and GI PCR pending  - Empiric therapy with ceftriaxone and flagyl  - discontinue vancomycin  - PRN pain meds  Left hip pain  Acute on chronic after fall  CT left hip shows superior posterior migration of the head of the left proximal femoral prosthesis into the left ilium, a finding which is not significantly changed when compared to imaging from November 2024.  No evidence of periprosthetic fracture or hardware complication associated with the left proximal femoral prosthesis.    - Ortho consulted, appreciate recs    - PRN pain meds  -  PT/OT  Chronic pain with uncomplicated opioid dependence  Hx noted.  reviewed  - Continue home percocet, Lyrica, and duloxetine  - Naloxone PRN  Seizure disorder  Stable  - Continue home keppra  - Seizure precautions  Paroxysmal atrial fibrillation  Controlled not on rate or rhythm control meds.  Not on anticoagulants either.  -telemetry monitoring  Chronic heart failure with preserved ejection fraction  Patient has Diastolic (HFpEF) heart failure that is Chronic. On presentation their CHF was well compensated.     Echo 01/17/24    Left Ventricle: The left ventricle is normal in size. Normal wall thickness. There is concentric remodeling. Normal wall motion. There is low normal systolic function with a visually estimated ejection fraction of 50 - 55%. There is normal diastolic function.    Right Ventricle: Normal right ventricular cavity size. Wall thickness is normal. Right ventricle wall motion  is normal. Systolic function is normal.    Aortic Valve: There is moderate aortic valve sclerosis. There is moderate annular calcification present.    Mitral Valve: There is severe mitral annular calcification present.    Aorta: Aortic root is normal in size measuring 3.37 cm. Ascending aorta is normal measuring 3.51 cm.    Pulmonary Artery: The estimated pulmonary artery systolic pressure is 25 mmHg.    IVC/SVC: Normal venous pressure at 3 mmHg.    Plan  - Monitor strict I&Os     - The patient's volume status is at their baseline  Hypertension  Chronic.  Mostly controlled.  Takes amlodipine-benazepril at home.  -continue amlodipine  -add lisinopril as formulary equivalent  Hypokalemia  Patient's most recent potassium results are listed below.   Recent Labs     07/25/25  2041 07/26/25  0547   K 3.4* 3.0*     Plan  - Replete potassium with IV replacement per patient preference  - Monitor potassium Daily  - Patient's hypokalemia is worse  Hypophosphatemia  Patient's most recent phosphorus results are listed below.    Recent Labs     07/26/25  0547   PHOS 2.3*     Plan  - Will treat hypophosphatemia with IV replacement per patient preference  - Patient's hypophosphatemia is stable  Anemia  Anemia is likely due to chronic disease.  No active bleeding.  Most recent hemoglobin and hematocrit are listed below.  Recent Labs     07/25/25 2041 07/26/25  0547   HGB 8.5* 7.5*   HCT 28.0* 23.8*     Plan  - Monitor serial CBC: Daily  - Transfuse PRBC if patient becomes hemodynamically unstable, symptomatic or H/H drops below 7/21.  - Patient has not received any PRBC transfusions to date  - Patient's anemia is currently worse    VTE Risk Mitigation (From admission, onward)           Ordered     enoxaparin injection 40 mg  Daily         07/25/25 2316     IP VTE HIGH RISK PATIENT  Once         07/25/25 2316     Place sequential compression device  Until discontinued         07/25/25 2316                    Discharge Planning   BAILEY:      Code Status: Full Code   Medical Readiness for Discharge Date:                 Maksim Blum MD  Department of Hospital Medicine   [facility]

## 2025-07-26 NOTE — ASSESSMENT & PLAN NOTE
Patient's most recent potassium results are listed below.   Recent Labs     07/25/25 2041 07/26/25  0547   K 3.4* 3.0*     Plan  - Replete potassium with IV replacement per patient preference  - Monitor potassium Daily  - Patient's hypokalemia is worse

## 2025-07-26 NOTE — HOSPITAL COURSE
79-year-old female with PMH per HPI who presents after a fall with acute on chronic left hip pain and diarrhea with abdominal pains.  CT of left hip showed superior posterior migration of the head of the left proximal femoral prosthesis into the left ilium which is not significantly changed from prior imaging in 2024.  CT abdomen and pelvis showed pancolitis.  Given IV antibiotics.  Stool studies negative.  Consults to both Orthopedic surgery and Gastroenterology.  Patient declined colonoscopy.  Recommended abx treatment for pancolitis x 7 days and was treated with IV ceftriaxone and IV metronidazole.  Orthopedic surgery noted a slight acetabular fracture. Recommended weightbearing as tolerated and orthopedic follow up in 2 weeks.  PT/OT were consulted and recommended moderate intensity therapy.  Patient declined placement.  Hemoglobin trended down to 6.8 with no obvious source of bleeding.  She was transfused one unit PRBCs with good response.  Diet was advanced to cardiac and was well tolerated.  She was seen and examined on date of discharge and was appropriate.  Discharge instructions were reviewed and return precautions discussed with good understanding. Home health was arranged.

## 2025-07-26 NOTE — PROGRESS NOTES
Pharmacist Renal Dose Adjustment Note    Froilan Ray is a 79 y.o. female being treated with the medication Famotidine.    Patient Data:    Vital Signs (Most Recent):  Temp: 98.2 °F (36.8 °C) (07/26/25 1147)  Pulse: 86 (07/26/25 1147)  Resp: 18 (07/26/25 1147)  BP: (!) 145/78 (07/26/25 1147)  SpO2: (!) 94 % (07/26/25 1147) Vital Signs (72h Range):  Temp:  [98.2 °F (36.8 °C)-100 °F (37.8 °C)]   Pulse:  []   Resp:  [16-20]   BP: (141-172)/(70-92)   SpO2:  [94 %-100 %]      Recent Labs   Lab 07/25/25 2041 07/26/25  0547   CREATININE 0.5 0.5     Serum creatinine: 0.5 mg/dL 07/26/25 0547  Estimated creatinine clearance: 88.2 mL/min    Famotidine 20 mg po daily as needed heartburn will be changed to Famotidine 20 mg po twice daily as needed for heartburn per renal protocol.    Pharmacist's Name: Karina Garcia  Pharmacist's Extension: [phone]

## 2025-07-26 NOTE — ASSESSMENT & PLAN NOTE
Acute on chronic after fall  CT left hip shows superior posterior migration of the head of the left proximal femoral prosthesis into the left ilium, a finding which is not significantly changed when compared to imaging from November 2024.  No evidence of periprosthetic fracture or hardware complication associated with the left proximal femoral prosthesis.    - Ortho consulted, appreciate recs    - PRN pain meds  - PT/OT

## 2025-07-26 NOTE — HPI
Ms. Ray is a 79 yr old female with a hx of chronic pain with opiod dependence, HTN, seizure disorder, PAF not on long term anticoag per home med list, CHF with preserved EF, left femur fracture s/p ORIF, neuropathy, diverticulosis, anemia, chronic gastric ulcer, osteoporosis, depression, staph arthritis of left hip, bacteremia, IBS who presented to the ED with a CC of fall and left hip pain. Patient is currently A&O x 3 to self, place, and month/year. She tells me that she slid out of her bed 3 days ago and caught herself before she actually fell. She denied hitting her head and doesn't think she hurt anything else, but since that time has been having left hip pain. She describes the pain as 7/10 intermittent throbbing pain worse with movement or laying on that side. She denies any radiation of her pain and states that she has been taking her home percocet with temporary relief of the pain. She also tells me that she has had multiple episodes of left hip pain similar to this in the past. She endorses diarrhea today as well. She denies tobacco and alcohol use. She denies fever, chills, SOB, CP, abdominal pain, nausea, vomiting, hematuria, dysuria, lightheadedness, dizziness, and syncope.     Upon arrival to ED, patient afebrile, HR of 96, RR of 16, BP of 148/83, satting 96% on RA. Workup in the ED revealed RBC of 3.53, H/H of 8.5/28, platelets of 452, sodium of 135, potassium of 3.4, glucose of 123, albumin of 3.2, CRP of 27.6. Remainder of labs fairly unremarkable. Left hip xray shows no evidence of acute osseous process or hardware complication involving the pelvis or bilateral hips. CT left hip shows superior posterior migration of the head of the left proximal femoral prosthesis into the left ilium, a finding which is not significantly changed when compared to imaging from November 2024.  No evidence of periprosthetic fracture or hardware complication associated with the left proximal femoral prosthesis. CT  abdomen/pelvis shows pancolitis. Patient was given morphine 2 mg IV x3 and ondansetron 4 mg IV while in the ED. Case discussed with ED provider and patient will be placed under observation for further management.

## 2025-07-26 NOTE — CONSULTS
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 Lasix, and neuropathy with surgical history of cholecystectomy, and strangulated abdominal wall hernia repair who presented with L pain after fall from bed. Denies diarrhea, abdominal pain, bleeding, melena. On admission, had CT of the abdomen with concern for colitis. Has had colonoscopies in the past and says she does not want another. She is feeling better and wants to go home.    CHART REVIEW:  Colonoscopy 24: R sided ischemic colitis  EUS 2024 pancreatic divisum shallow GEJ ring with 3-4 cm hiatal hernia  EGD Dr. Corey 06/10/2022 food in the gastric body.  Superficial gastric ulcer.  Antral erythema  EGD/colonoscopy 2022 esophageal stenosis small hiatal hernia.  Oozing gastric ulcers.  Diverticulosis sigmoid colon  EUS 2020 normal EGD pancreatic divisum no stones in the common bile duct    Past Medical History:  Past Medical History:   Diagnosis Date    Anemia     Arthritis     Bleeding ulcer 2016    Chronic pain     DDD (degenerative disc disease), cervical     DDD (degenerative disc disease), lumbar     Depression     Disc degeneration, lumbosacral     Diverticulitis     Encounter for blood transfusion     Hypertension     IBS  (irritable bowel syndrome)     Neuropathy of both feet     Seizures     none  since 2017    Umbilical hernia 2020        Past Surgical History:  Past Surgical History:   Procedure Laterality Date    ARTHROPLASTY, HIP, GIRDLESTONE, POSTERIOR APPROACH Left 1/19/2024    Procedure: ARTHROPLASTY, HIP, GIRDLESTONE, POSTERIOR APPROACH;  Surgeon: Bulmaro Tellez MD;  Location: [facility] OR [facility] FLR;  Service: Orthopedics;  Laterality: Left;    ARTHROPLASTY, HIP, GIRDLESTONE, POSTERIOR APPROACH Left 1/25/2024    Procedure: Irrigation and debridement left hip,;  Surgeon: Juanito Painting MD;  Location: [facility] OR 2ND FLR;  Service: Orthopedics;  Laterality: Left;    ARTHROPLASTY, HIP, GIRDLESTONE, POSTERIOR APPROACH Left 2/15/2024    Procedure: Revision hip antibiotic spacer head exchange, left, lateral, peg board, depuy osteomed in room, vanc, ancef, txa,;  Surgeon: Bulmaro Tellez MD;  Location: [facility] OR 2ND FLR;  Service: Orthopedics;  Laterality: Left;    BACK SURGERY      BREAST BIOPSY      BREAST SURGERY Right     lumpectomy    CAUDAL EPIDURAL STEROID INJECTION N/A 01/28/2022    Procedure: Injection-steroid-epidural-caudal;  Surgeon: Luzmaria Marcelino MD;  Location: [facility] OR;  Service: Pain Management;  Laterality: N/A;    COLONOSCOPY N/A 01/14/2022    Procedure: COLONOSCOPY;  Surgeon: Abby Landers MD;  Location: [facility];  Service: Endoscopy;  Laterality: N/A;    COLONOSCOPY N/A 11/30/2024    Procedure: COLONOSCOPY;  Surgeon: Marcio Nguyễn III, MD;  Location: [facility] ENDO;  Service: Endoscopy;  Laterality: N/A;    ENDOSCOPIC ULTRASOUND OF UPPER GASTROINTESTINAL TRACT N/A 07/21/2020    Procedure: ULTRASOUND, UPPER GI TRACT, ENDOSCOPIC;  Surgeon: Marcio Nguyễn III, MD;  Location: [facility] ENDO;  Service: Endoscopy;  Laterality: N/A;    ENDOSCOPIC ULTRASOUND OF UPPER GASTROINTESTINAL TRACT N/A 9/27/2024    Procedure: ULTRASOUND, UPPER GI TRACT, ENDOSCOPIC;  Surgeon: Marcio Nguyễn III, MD;  Location:  [facility] ENDO;  Service: Endoscopy;  Laterality: N/A;    ESOPHAGOGASTRODUODENOSCOPY N/A 01/14/2022    Procedure: EGD (ESOPHAGOGASTRODUODENOSCOPY);  Surgeon: Abby Landers MD;  Location: [facility] ENDO;  Service: Endoscopy;  Laterality: N/A;    ESOPHAGOGASTRODUODENOSCOPY N/A 06/10/2022    Procedure: EGD (ESOPHAGOGASTRODUODENOSCOPY);  Surgeon: Abby Landers MD;  Location: [facility] ENDO;  Service: Endoscopy;  Laterality: N/A;    EYE SURGERY      cataract    FLAP GRAFT, LOCAL Left 2/15/2024    Procedure: FLAP GRAFT, LOCAL;  Surgeon: Bulmaro Tellez MD;  Location: [facility] OR 2ND FLR;  Service: Orthopedics;  Laterality: Left;    HYSTERECTOMY      INTRAMEDULLARY RODDING OF TROCHANTER OF FEMUR Left 12/11/2018    Procedure: INSERTION, INTRAMEDULLARY SANTOS, FEMUR, TROCHANTER;  Surgeon: Eulaloi De La Cruz MD;  Location: [facility] OR;  Service: Orthopedics;  Laterality: Left;    IRRIGATION AND DEBRIDEMENT OF LOWER EXTREMITY Left 1/19/2024    Procedure: IRRIGATION AND DEBRIDEMENT, LOWER EXTREMITY;  Surgeon: Bulmaro Tellez MD;  Location: [facility] OR 2ND FLR;  Service: Orthopedics;  Laterality: Left;    IRRIGATION AND DEBRIDEMENT OF LOWER EXTREMITY Left 2/15/2024    Procedure: IRRIGATION AND DEBRIDEMENT, LOWER EXTREMITY;  Surgeon: Bulmaro Tellez MD;  Location: [facility] OR 2ND FLR;  Service: Orthopedics;  Laterality: Left;    LAPAROSCOPIC CHOLECYSTECTOMY N/A 9/8/2024    Procedure: CHOLECYSTECTOMY, LAPAROSCOPIC;  Surgeon: Cipriano Ambrosio MD;  Location: [facility] OR;  Service: General;  Laterality: N/A;    REPAIR OF EPIGASTRIC HERNIA N/A 12/7/2023    Procedure: REPAIR, HERNIA, EPIGASTRIC;  Surgeon: Vipul Escobar MD;  Location: [facility] OR;  Service: General;  Laterality: N/A;    SPINAL CORD STIMULATOR IMPLANT  09/18/2013    and removal    SPINE SURGERY  2006    lumbar L2-S1 decompression.    SPINE SURGERY      cervical decompression    TONSILLECTOMY          Home Medications:  Prescriptions Prior to Admission[1]    Inpatient Medications:    amLODIPine  5 mg Oral Daily    cefTRIAXone (Rocephin) IV (PEDS and ADULTS)  1 g Intravenous Q24H    enoxparin  40 mg Subcutaneous Daily    levETIRAcetam  500 mg Oral BID    metroNIDAZOLE IV (PEDS and ADULTS)  500 mg Intravenous Q8H    multivitamin  1 tablet Oral Daily    potassium chloride  10 mEq Intravenous Q1H    potassium phosphate IVPB  15 mmol Intravenous Once    pregabalin  200 mg Oral BID       Current Facility-Administered Medications:     acetaminophen, 650 mg, Oral, Q4H PRN    aluminum-magnesium hydroxide-simethicone, 30 mL, Oral, QID PRN    dextrose 50%, 12.5 g, Intravenous, PRN    dextrose 50%, 25 g, Intravenous, PRN    famotidine, 20 mg, Oral, Daily PRN    glucagon (human recombinant), 1 mg, Intramuscular, PRN    glucose, 16 g, Oral, PRN    glucose, 24 g, Oral, PRN    HYDROcodone-acetaminophen, 1 tablet, Oral, Q6H PRN    magnesium oxide, 800 mg, Oral, PRN    magnesium oxide, 800 mg, Oral, PRN    melatonin, 6 mg, Oral, Nightly PRN    naloxone, 0.02 mg, Intravenous, PRN    ondansetron, 4 mg, Intravenous, Q6H PRN    potassium bicarbonate, 35 mEq, Oral, PRN    potassium bicarbonate, 50 mEq, Oral, PRN    potassium bicarbonate, 60 mEq, Oral, PRN    potassium, sodium phosphates, 2 packet, Oral, PRN    potassium, sodium phosphates, 2 packet, Oral, PRN    potassium, sodium phosphates, 2 packet, Oral, PRN    sodium chloride 0.9%, 10 mL, Intravenous, Q12H PRN    Review of patient's allergies indicates:  No Known Allergies    Social History:   Social History     Occupational History    Not on file   Tobacco Use    Smoking status: Never    Smokeless tobacco: Never   Substance and Sexual Activity    Alcohol use: No    Drug use: No    Sexual activity: Not Currently       Family History:   Family History   Problem Relation Name Age of Onset    Diabetes Mother      Hypertension Mother      Irritable bowel syndrome Mother      Diabetes Father          insulin dependent    Hypertension Father      Heart disease Father    "   Coronary artery disease Father      Depression Father      Diabetes Sister      Diabetes Brother      COPD Brother         Review of Systems:  Review of Systems   Constitutional:  Negative for chills and fever.   HENT:  Negative for nosebleeds and sore throat.    Eyes:  Negative for pain.   Respiratory:  Negative for shortness of breath and wheezing.    Cardiovascular:  Negative for chest pain and leg swelling.   Gastrointestinal:  Negative for abdominal pain, blood in stool, constipation, diarrhea, melena, nausea and vomiting.   Genitourinary:  Negative for dysuria and hematuria.   Musculoskeletal:  Positive for falls. Negative for myalgias.   Skin:  Negative for itching and rash.   Neurological:  Negative for focal weakness and loss of consciousness.       OBJECTIVE:    Physical Exam:  24 Hour Vital Sign Ranges: Temp:  [98.2 °F (36.8 °C)-100 °F (37.8 °C)] 98.2 °F (36.8 °C)  Pulse:  [] 86  Resp:  [16-20] 18  SpO2:  [94 %-100 %] 94 %  BP: (141-172)/(70-92) 145/78  Most recent vitals: BP (!) 145/78 (BP Location: Left arm, Patient Position: Lying)   Pulse 86   Temp 98.2 °F (36.8 °C) (Oral)   Resp 18   Ht 5' 6" (1.676 m)   Wt 62.1 kg (136 lb 14.5 oz)   SpO2 (!) 94%   BMI 22.10 kg/m²    CONSTITUTIONAL: laying in bed, NAD  Eyes: PERRL, anicteric conjunctivae  ENT: nares patent, oral mucosa pink and moist without lesion  NECK: trachea midline; Good ROM  CV: regular rate and rhythm, no murmurs or gallops  RESP: clear to auscultation bilaterally, no wheezes, rhonci or rales  ABD: soft, non-tender, non-distended, normal bowel sounds  MSK: no swelling or edema, 2+ pulses distally  INTEGUMENT: warm/dry, no rashes or jaundice on limited skin exam  NEURO: appropriately conversant, no asterixis  PSYCH: normal affect    Labs:   I have personally reviewed the pertinent/available labs in [facility].     Recent Labs     07/25/25 2041 07/26/25  0547   WBC 9.25 7.75   MCV 79* 77*   * 367     Recent Labs     " "07/25/25 2041 07/26/25  0547   * 137   K 3.4* 3.0*    103   CO2 25 23   BUN 9 8   * 110     No results for input(s): "ALB" in the last 72 hours.    Invalid input(s): "ALKP", "SGOT", "SGPT", "TBIL", "DBIL", "TPRO"  No results for input(s): "PT", "INR", "PTT" in the last 72 hours.      Radiology Review:  I have personally reviewed the recent available imaging in [facility].    CT Hip With Contrast Left   Final Result      Superior posterior migration of the head of the left proximal femoral prosthesis into the left ilium, a finding which is not significantly changed when compared to imaging from November 2024.  No evidence of periprosthetic fracture or hardware complication associated with the left proximal femoral prosthesis.         Electronically signed by: Rolando Jon   Date:    07/25/2025   Time:    22:22      CT Abdomen Pelvis With IV Contrast NO Oral Contrast   Final Result      Pancolitis most prominently involving the cecum and ascending colon.         Electronically signed by: Rolando Jon   Date:    07/25/2025   Time:    22:13      X-Ray Hip 2 or 3 views Left with Pelvis when performed   Final Result      No evidence of acute osseous process or hardware complication involving the pelvis or bilateral hips.         Electronically signed by: Rolando Jon   Date:    07/25/2025   Time:    20:45          Endoscopy:  I have personally reviewed and interpreted the reports and images from the endoscopy reports available in [facility].      IMPRESSION / RECOMMENDATIONS:  Abnormal CT of GI Tract  Pancolitis  - discussed CT findings  - recommended colonoscopy for further evaluation but patient declines. Says that she understands limitation in diagnosis without but does not want to go through a colonoscopy as she is feeling better  - advance diet as tolerated  - continue abx for 7 days. Can do cipro/flagyl on discharge and follow up with her GI in 2 weeks    Plan discussed with consulting " physician Dr Blum by [employer].    Thank you for this consult.    David Vasquez  7/26/2025  12:24 PM         [1]   Medications Prior to Admission   Medication Sig Dispense Refill Last Dose/Taking    acetaminophen (TYLENOL) 325 MG tablet Take 2 tablets (650 mg total) by mouth every 8 (eight) hours as needed for Pain (pain scale 1-4).  0 7/25/2025 Noon    amlodipine-benazepril 5-20 mg (LOTREL) 5-20 mg per capsule Take 1 capsule by mouth once daily. 90 capsule 3 7/25/2025 Morning    cyclobenzaprine (FLEXERIL) 10 MG tablet Take 1 tablet (10 mg total) by mouth 3 (three) times daily as needed for Muscle spasms.   7/25/2025 Noon    docusate sodium (COLACE) 100 MG capsule Take 1 capsule (100 mg total) by mouth 2 (two) times daily. (Patient taking differently: Take 100 mg by mouth 2 (two) times daily as needed for Constipation.) 60 capsule 1 Past Week    DULoxetine (CYMBALTA) 30 MG capsule Take 30 mg by mouth once daily.   7/25/2025 Morning    famotidine (PEPCID) 20 MG tablet Take 20 mg by mouth daily as needed for Heartburn.   Past Week    levETIRAcetam (KEPPRA) 500 MG Tab Take 1 tablet (500 mg total) by mouth 2 (two) times daily. 180 tablet 0 7/25/2025 Evening    multivitamin with minerals tablet Take 1 tablet by mouth once daily.   7/25/2025 Morning    oxyCODONE-acetaminophen (PERCOCET)  mg per tablet Take 1 tablet by mouth every 4 (four) hours as needed for Pain. 12 tablet 0 7/25/2025 Evening    pregabalin (LYRICA) 200 MG Cap Take 1 capsule (200 mg total) by mouth 2 (two) times daily. 90 capsule 0 7/25/2025 Evening    ondansetron (ZOFRAN-ODT) 4 MG TbDL Take 1 tablet (4 mg total) by mouth every 8 (eight) hours as needed (nausea, vomiting). (Patient not taking: Reported on 7/26/2025) 12 tablet 0 Not Taking

## 2025-07-26 NOTE — SUBJECTIVE & OBJECTIVE
Past Medical History:   Diagnosis Date    Anemia     Arthritis     Bleeding ulcer 07/2016    Chronic pain     DDD (degenerative disc disease), cervical     DDD (degenerative disc disease), lumbar     Depression     Disc degeneration, lumbosacral     Diverticulitis     Encounter for blood transfusion     Hypertension     IBS (irritable bowel syndrome)     Neuropathy of both feet     Seizures     none  since 2017    Umbilical hernia 2020       Past Surgical History:   Procedure Laterality Date    ARTHROPLASTY, HIP, GIRDLESTONE, POSTERIOR APPROACH Left 1/19/2024    Procedure: ARTHROPLASTY, HIP, GIRDLESTONE, POSTERIOR APPROACH;  Surgeon: Bulmaro Tellez MD;  [facility];  Service: Orthopedics;  Laterality: Left;    ARTHROPLASTY, HIP, GIRDLESTONE, POSTERIOR APPROACH Left 1/25/2024    Procedure: Irrigation and debridement left hip,;  Surgeon: Juanito Painting MD;  [facility];  Service: Orthopedics;  Laterality: Left;    ARTHROPLASTY, HIP, GIRDLESTONE, POSTERIOR APPROACH Left 2/15/2024    Procedure: Revision hip antibiotic spacer head exchange, left, lateral, peg board, depuy osteomed in room, vanc, ancef, txa,;  Surgeon: Bulmaro Tellez MD;  Location: [facility];  Service: Orthopedics;  Laterality: Left;    BACK SURGERY      BREAST BIOPSY      BREAST SURGERY Right     lumpectomy    CAUDAL EPIDURAL STEROID INJECTION N/A 01/28/2022    Procedure: Injection-steroid-epidural-caudal;  Surgeon: Luzmaria Marcelino MD;  Location: [facility] OR;  Service: Pain Management;  Laterality: N/A;    COLONOSCOPY N/A 01/14/2022    Procedure: COLONOSCOPY;  Surgeon: Abby Landers MD;  Location: [facility];  Service: Endoscopy;  Laterality: N/A;    COLONOSCOPY N/A 11/30/2024    Procedure: COLONOSCOPY;  Surgeon: Marcio Nguyễn III, MD;  Location: [facility] ENDO;  Service: Endoscopy;  Laterality: N/A;    ENDOSCOPIC ULTRASOUND OF UPPER GASTROINTESTINAL TRACT N/A 07/21/2020    Procedure: ULTRASOUND,  UPPER GI TRACT, ENDOSCOPIC;  Surgeon: Marcio Nguyễn III, MD;  Location: [facility] ENDO;  Service: Endoscopy;  Laterality: N/A;    ENDOSCOPIC ULTRASOUND OF UPPER GASTROINTESTINAL TRACT N/A 9/27/2024    Procedure: ULTRASOUND, UPPER GI TRACT, ENDOSCOPIC;  Surgeon: Marcio Nguyễn III, MD;  Location: [facility] ENDO;  Service: Endoscopy;  Laterality: N/A;    ESOPHAGOGASTRODUODENOSCOPY N/A 01/14/2022    Procedure: EGD (ESOPHAGOGASTRODUODENOSCOPY);  Surgeon: Abby Landers MD;  Location: [facility] ENDO;  Service: Endoscopy;  Laterality: N/A;    ESOPHAGOGASTRODUODENOSCOPY N/A 06/10/2022    Procedure: EGD (ESOPHAGOGASTRODUODENOSCOPY);  Surgeon: Abby Landers MD;  Location: [facility] ENDO;  Service: Endoscopy;  Laterality: N/A;    EYE SURGERY      cataract    FLAP GRAFT, LOCAL Left 2/15/2024    Procedure: FLAP GRAFT, LOCAL;  Surgeon: Bulmaro Tellez MD;  Location: [facility] OR 2ND FLR;  Service: Orthopedics;  Laterality: Left;    HYSTERECTOMY      INTRAMEDULLARY RODDING OF TROCHANTER OF FEMUR Left 12/11/2018    Procedure: INSERTION, INTRAMEDULLARY SANTOS, FEMUR, TROCHANTER;  Surgeon: Eulalio De La Cruz MD;  Location: [facility];  Service: Orthopedics;  Laterality: Left;    IRRIGATION AND DEBRIDEMENT OF LOWER EXTREMITY Left 1/19/2024    Procedure: IRRIGATION AND DEBRIDEMENT, LOWER EXTREMITY;  Surgeon: Bulmaro Tellez MD;  Location: [facility] OR 2ND FLR;  Service: Orthopedics;  Laterality: Left;    IRRIGATION AND DEBRIDEMENT OF LOWER EXTREMITY Left 2/15/2024    Procedure: IRRIGATION AND DEBRIDEMENT, LOWER EXTREMITY;  Surgeon: Bulmaro Tellez MD;  Location: [facility] OR 2ND FLR;  Service: Orthopedics;  Laterality: Left;    LAPAROSCOPIC CHOLECYSTECTOMY N/A 9/8/2024    Procedure: CHOLECYSTECTOMY, LAPAROSCOPIC;  Surgeon: Cipriano Ambrosio MD;  Location: [facility];  Service: General;  Laterality: N/A;    REPAIR OF EPIGASTRIC HERNIA N/A 12/7/2023    Procedure: REPAIR, HERNIA, EPIGASTRIC;  Surgeon: Vipul Escobar MD;  Location: [facility];   Service: General;  Laterality: N/A;    SPINAL CORD STIMULATOR IMPLANT  09/18/2013    and removal    SPINE SURGERY  2006    lumbar L2-S1 decompression.    SPINE SURGERY      cervical decompression    TONSILLECTOMY         Review of patient's allergies indicates:  No Known Allergies    No current facility-administered medications on file prior to encounter.     Current Outpatient Medications on File Prior to Encounter   Medication Sig    acetaminophen (TYLENOL) 325 MG tablet Take 2 tablets (650 mg total) by mouth every 8 (eight) hours as needed for Pain (pain scale 1-4).    amlodipine-benazepril 5-20 mg (LOTREL) 5-20 mg per capsule Take 1 capsule by mouth once daily.    cyclobenzaprine (FLEXERIL) 10 MG tablet Take 1 tablet (10 mg total) by mouth 3 (three) times daily as needed for Muscle spasms. (Patient not taking: Reported on 4/23/2025)    docusate sodium (COLACE) 100 MG capsule Take 1 capsule (100 mg total) by mouth 2 (two) times daily.    famotidine (PEPCID) 20 MG tablet Take 20 mg by mouth daily as needed for Heartburn.    levETIRAcetam (KEPPRA) 500 MG Tab Take 1 tablet (500 mg total) by mouth 2 (two) times daily.    multivitamin with minerals tablet Take 1 tablet by mouth once daily.    ondansetron (ZOFRAN-ODT) 4 MG TbDL Take 1 tablet (4 mg total) by mouth every 8 (eight) hours as needed (nausea, vomiting).    oxyCODONE-acetaminophen (PERCOCET)  mg per tablet Take 1 tablet by mouth every 4 (four) hours as needed for Pain. (Patient not taking: Reported on 4/23/2025)    pantoprazole (PROTONIX) 40 MG tablet Take 1 tablet (40 mg total) by mouth once daily.    pregabalin (LYRICA) 200 MG Cap Take 1 capsule (200 mg total) by mouth 2 (two) times daily.     Family History       Problem Relation (Age of Onset)    COPD Brother    Coronary artery disease Father    Depression Father    Diabetes Mother, Father, Sister, Brother    Heart disease Father    Hypertension Mother, Father    Irritable bowel syndrome Mother           Tobacco Use    Smoking status: Never    Smokeless tobacco: Never   Substance and Sexual Activity    Alcohol use: No    Drug use: No    Sexual activity: Not Currently     Review of Systems   Constitutional:  Negative for chills and fever.   Respiratory:  Negative for shortness of breath.    Cardiovascular:  Negative for chest pain.   Gastrointestinal:  Positive for diarrhea. Negative for abdominal pain, nausea and vomiting.   Genitourinary:  Negative for dysuria and hematuria.   Musculoskeletal:  Positive for arthralgias (left hip).   Neurological:  Negative for dizziness, syncope and light-headedness.     Objective:     Vital Signs (Most Recent):  Temp: 98.5 °F (36.9 °C) (07/25/25 2057)  Pulse: 93 (07/25/25 2326)  Resp: 16 (07/25/25 2305)  BP: (!) 160/78 (07/25/25 2326)  SpO2: 98 % (07/25/25 2326) Vital Signs (24h Range):  Temp:  [98.5 °F (36.9 °C)] 98.5 °F (36.9 °C)  Pulse:  [89-97] 93  Resp:  [16] 16  SpO2:  [96 %-100 %] 98 %  BP: (141-172)/(70-83) 160/78        There is no height or weight on file to calculate BMI.     Physical Exam  Vitals reviewed.   Constitutional:       General: She is awake. She is not in acute distress.     Appearance: Normal appearance. She is not diaphoretic.   Cardiovascular:      Rate and Rhythm: Normal rate.      Heart sounds: Murmur heard.      No friction rub. No gallop.   Pulmonary:      Effort: Pulmonary effort is normal. No accessory muscle usage or respiratory distress.      Breath sounds: Normal breath sounds. No wheezing, rhonchi or rales.   Abdominal:      General: Abdomen is flat. Bowel sounds are normal.      Palpations: Abdomen is soft.      Tenderness: There is generalized abdominal tenderness. There is no guarding or rebound.   Musculoskeletal:      Right lower leg: No edema.      Left lower leg: No edema.      Comments: Left hip with significant tenderness to palpation on exam. No overlying skin changes or deformities appreciated. ROM decreased 2/2 pain.   Skin:      General: Skin is warm and dry.   Neurological:      Mental Status: She is alert and oriented to person, place, and time.      Comments: A&O x 3 to person, place, and month/yr   Psychiatric:         Behavior: Behavior is cooperative.                Significant Labs: All pertinent labs within the past 24 hours have been reviewed.  CBC:   Recent Labs   Lab 07/25/25 2041   WBC 9.25   HGB 8.5*   HCT 28.0*   *     CMP:   Recent Labs   Lab 07/25/25 2041   *   K 3.4*      CO2 25   *   BUN 9   CREATININE 0.5   CALCIUM 9.4   PROT 6.7   ALBUMIN 3.2*   BILITOT 0.4   ALKPHOS 104   AST 9*   ALT 6*   ANIONGAP 6*     Lipase:   Recent Labs   Lab 07/25/25 2041   LIPASE <3*     Urine Studies:   Recent Labs   Lab 07/25/25 2148   APPEARANCEUA Clear   SPECGRAV >=1.030*   PROTEINUA 1+*   BILIRUBINUA Negative   UROBILINOGEN Negative   LEUKOCYTESUR Negative   RBCUA 1   WBCUA 2       Significant Imaging:   Imaging Results              CT Hip With Contrast Left (Final result)  Result time 07/25/25 22:22:58      Final result by Rolando Jon MD (07/25/25 22:22:58)                   Impression:      Superior posterior migration of the head of the left proximal femoral prosthesis into the left ilium, a finding which is not significantly changed when compared to imaging from November 2024.  No evidence of periprosthetic fracture or hardware complication associated with the left proximal femoral prosthesis.      Electronically signed by: Rolando Jon  Date:    07/25/2025  Time:    22:22               Narrative:    EXAMINATION:  CT HIP WITH CONTRAST LEFT    CLINICAL HISTORY:  Septic arthritis suspected, hip, xray done;    TECHNIQUE:  Axial CT images were obtained of the left hip after the administration of 100 mL of Omnipaque 350 intravenous contrast.  Sagittal and coronal reformats were performed.    COMPARISON:  CT abdomen pelvis from 11/27/2024.    FINDINGS:  There is superior posterior migration of the  head of the left proximal femoral prosthesis into the ilium by proximally 2.5 cm.  No evidence of periprosthetic fracture or hardware complication.  A left hip joint effusion is present posteriorly with osseous loose bodies noted dependently.  For findings regarding the pelvic viscera, see dedicated concurrent CT abdomen pelvis.  No evidence of soft tissue or osseous mass.                                       CT Abdomen Pelvis With IV Contrast NO Oral Contrast (Final result)  Result time 07/25/25 22:13:50      Final result by Rolando Jon MD (07/25/25 22:13:50)                   Impression:      Pancolitis most prominently involving the cecum and ascending colon.      Electronically signed by: Rolando Jon  Date:    07/25/2025  Time:    22:13               Narrative:    EXAMINATION:  CT ABDOMEN PELVIS WITH IV CONTRAST    CLINICAL HISTORY:  Abdominal abscess/infection suspected;    TECHNIQUE:  Low dose axial images, sagittal and coronal reformations were obtained from the lung bases to the pubic symphysis following the IV administration of 100 mL of Omnipaque 350.    COMPARISON:  CT abdomen pelvis from 07/04/2025.    FINDINGS:  Lung bases: No worrisome pulmonary nodules or focal consolidations are identified.  A calcified granuloma is noted at the left lung base.  A small hiatal hernia is present.    Liver: No focal mass.  Small hepatic cysts are present.    Gallbladder: The gallbladder is surgically absent.    Bile Ducts: Prominent diameter of the common bile duct and intrahepatic biliary ducts are stable, likely representing sequela of reservoir effect.    Spleen: Unremarkable.    Kidneys: No renal mass, calcification, or hydronephrosis.    Adrenals: Unremarkable.    Pancreas: There is pancolitis which most prominently involves the cecum and ascending colon    Bowel: No evidence of bowel obstruction or inflammation.    Lymph nodes: No evidence of lymphadenopathy involving the abdomen or  pelvis.    Vascular: The abdominal aorta is normal in diameter.    Pelvic organs: No evidence of pelvic mass.    Urinary Bladder: Unremarkable.    Bones:  No evidence of acute osseous process.  The appearance of the scoliotic spine is stable.    Abdominal wall:  Unremarkable.    Other: None.                                       X-Ray Hip 2 or 3 views Left with Pelvis when performed (Final result)  Result time 07/25/25 20:45:26      Final result by Rolando Jon MD (07/25/25 20:45:26)                   Impression:      No evidence of acute osseous process or hardware complication involving the pelvis or bilateral hips.      Electronically signed by: Rolando Jon  Date:    07/25/2025  Time:    20:45               Narrative:    EXAMINATION:  XR HIP WITH PELVIS WHEN PERFORMED 2 OR 3 VIEWS LEFT    CLINICAL HISTORY:  Pain in unspecified hip    TECHNIQUE:  Three views of the pelvis and left hip were obtained.    COMPARISON:  Radiographs of the pelvis and right hip from 04/12/2025.    FINDINGS:  No evidence of acute fracture or dislocation involving the pelvis or bilateral hips.  A proximal left femoral prosthesis is present without evidence of periprosthetic fracture or hardware complication.  No evidence of subcutaneous emphysema or radiopaque foreign body.  No evidence of soft tissue or osseous mass.

## 2025-07-26 NOTE — PLAN OF CARE
completed discharge reassessment with pt at pt bedside. Patient is readmission. Pt AAOx4s. Pt lives with her  Otoniel Ray.  Pt verified that her information was still the same. Demographics, PCP, and insurance verified. No home health. No dialysis. No oxygen.  Pt completes ADLs with some assistance. Pt does have DME: walker, wheelchair and bedside commode. Pt to discharge home via family transport via her  Otoniel. Pt has no other needs to be addressed at this time.        07/26/25 2695   Discharge Planning   Assessment Type Discharge Planning Reassessment   Support Systems Spouse/significant other;Children   Equipment Currently Used at Home walker, rolling;wheelchair;bedside commode   Current Living Arrangements home   DME Needed Upon Discharge  none   Discharge Plan A Home with family   Discharge Plan B Home with family

## 2025-07-26 NOTE — NURSING
Patient is screaming uncontrollably. She is currently NPO and saying she is going to die if she can't get water. Patient trying to get out of bed and setting bed alarm off. Dr Gill notified and awaiting response.

## 2025-07-26 NOTE — ASSESSMENT & PLAN NOTE
- CTAP performed in the ED with pancolitis  - Pt with known hx of diverticulitis  - GI consulted, appreciate recs  - NPO at midnight  - Hx of long term abx use  - C Diff and GI PCR pending  - Empiric therapy with ceftriaxone and flagyl  - PRN analgesia  - Symptomatic care

## 2025-07-26 NOTE — ASSESSMENT & PLAN NOTE
Anemia is likely due to chronic disease.  No active bleeding.  Most recent hemoglobin and hematocrit are listed below.  Recent Labs     07/25/25  2041 07/26/25  0547   HGB 8.5* 7.5*   HCT 28.0* 23.8*     Plan  - Monitor serial CBC: Daily  - Transfuse PRBC if patient becomes hemodynamically unstable, symptomatic or H/H drops below 7/21.  - Patient has not received any PRBC transfusions to date  - Patient's anemia is currently worse

## 2025-07-26 NOTE — ASSESSMENT & PLAN NOTE
Patient has Diastolic (HFpEF) heart failure that is Chronic. On presentation their CHF was well compensated.     Echo 01/17/24    Left Ventricle: The left ventricle is normal in size. Normal wall thickness. There is concentric remodeling. Normal wall motion. There is low normal systolic function with a visually estimated ejection fraction of 50 - 55%. There is normal diastolic function.    Right Ventricle: Normal right ventricular cavity size. Wall thickness is normal. Right ventricle wall motion  is normal. Systolic function is normal.    Aortic Valve: There is moderate aortic valve sclerosis. There is moderate annular calcification present.    Mitral Valve: There is severe mitral annular calcification present.    Aorta: Aortic root is normal in size measuring 3.37 cm. Ascending aorta is normal measuring 3.51 cm.    Pulmonary Artery: The estimated pulmonary artery systolic pressure is 25 mmHg.    IVC/SVC: Normal venous pressure at 3 mmHg.    Plan  - Monitor strict I&Os     - The patient's volume status is at their baseline

## 2025-07-26 NOTE — ASSESSMENT & PLAN NOTE
Patient's most recent phosphorus results are listed below.   Recent Labs     07/26/25  0547   PHOS 2.3*     Plan  - Will treat hypophosphatemia with IV replacement per patient preference  - Patient's hypophosphatemia is stable

## 2025-07-26 NOTE — H&P
[facility] - Emergency Dept  Hospital Medicine  History & Physical    Patient Name: Froilan Ray  MRN: [id number]  Patient Class: OP- Observation  Admission Date: 7/25/2025  Attending Physician: Nito Gill MD   Primary Care Provider: Alphonse Boothe III, MD         Patient information was obtained from patient, past medical records, and ER records.     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 Ray is a 79 yr old female with a hx of chronic pain with opiod dependence, HTN, seizure disorder, PAF not on long term anticoag per home med list, CHF with preserved EF, left femur fracture s/p ORIF, neuropathy, diverticulosis, anemia, chronic gastric ulcer, osteoporosis, depression, staph arthritis of left hip, bacteremia, IBS who presented to the ED with a CC of fall and left hip pain. Patient is currently A&O x 3 to self, place, and month/year. She tells me that she slid out of her bed 3 days ago and caught herself before she actually fell. She denied hitting her head and doesn't think she hurt anything else, but since that time has been having left hip pain. She describes the pain as 7/10 intermittent throbbing pain worse with movement or laying on that side. She denies any radiation of her pain and states that she has been taking her home percocet with temporary relief of the pain. She also tells me that she has had multiple episodes of left hip pain similar to this in the past. She endorses diarrhea today as well. She denies tobacco and alcohol use. She denies fever, chills, SOB, CP, abdominal pain, nausea, vomiting, hematuria, dysuria, lightheadedness, dizziness, and syncope.     Upon arrival to ED, patient afebrile, HR of 96, RR of 16, BP of 148/83, satting 96% on RA. Workup in the ED revealed RBC  of 3.53, H/H of 8.5/28, platelets of 452, sodium of 135, potassium of 3.4, glucose of 123, albumin of 3.2, CRP of 27.6. Remainder of labs fairly unremarkable. Left hip xray shows no evidence of acute osseous process or hardware complication involving the pelvis or bilateral hips. CT left hip shows superior posterior migration of the head of the left proximal femoral prosthesis into the left ilium, a finding which is not significantly changed when compared to imaging from November 2024.  No evidence of periprosthetic fracture or hardware complication associated with the left proximal femoral prosthesis. CT abdomen/pelvis shows pancolitis. Patient was given morphine 2 mg IV x3 and ondansetron 4 mg IV while in the ED. Case discussed with ED provider and patient will be placed under observation for further management.    Past Medical History:   Diagnosis Date    Anemia     Arthritis     Bleeding ulcer 07/2016    Chronic pain     DDD (degenerative disc disease), cervical     DDD (degenerative disc disease), lumbar     Depression     Disc degeneration, lumbosacral     Diverticulitis     Encounter for blood transfusion     Hypertension     IBS (irritable bowel syndrome)     Neuropathy of both feet     Seizures     none  since 2017    Umbilical hernia 2020       Past Surgical History:   Procedure Laterality Date    ARTHROPLASTY, HIP, GIRDLESTONE, POSTERIOR APPROACH Left 1/19/2024    Procedure: ARTHROPLASTY, HIP, GIRDLESTONE, POSTERIOR APPROACH;  Surgeon: Bulmaro Tellez MD;  Location: [facility] OR [address] [place] [address];  Service: Orthopedics;  Laterality: Left;    ARTHROPLASTY, HIP, GIRDLESTONE, POSTERIOR APPROACH Left 1/25/2024    Procedure: Irrigation and debridement left hip,;  Surgeon: Juanito Painting MD;  Location: [facility] OR [address] [place] [address];  Service: Orthopedics;  Laterality: Left;    ARTHROPLASTY, HIP, GIRDLESTONE, POSTERIOR APPROACH Left 2/15/2024    Procedure: Revision hip antibiotic spacer head exchange, left, lateral, peg board,  depuy osteomed in room, vanc, ancef, txa,;  Surgeon: Bulmaro Tellez MD;  Location: [facility] OR 2ND FLR;  Service: Orthopedics;  Laterality: Left;    BACK SURGERY      BREAST BIOPSY      BREAST SURGERY Right     lumpectomy    CAUDAL EPIDURAL STEROID INJECTION N/A 01/28/2022    Procedure: Injection-steroid-epidural-caudal;  Surgeon: Luzmaria Marcelino MD;  [facility];  Service: Pain Management;  Laterality: N/A;    COLONOSCOPY N/A 01/14/2022    Procedure: COLONOSCOPY;  Surgeon: Abby Landers MD;  Location: [facility] ENDO;  Service: Endoscopy;  Laterality: N/A;    COLONOSCOPY N/A 11/30/2024    Procedure: COLONOSCOPY;  Surgeon: Marcio Nguyễn III, MD;  Location: [facility] ENDO;  Service: Endoscopy;  Laterality: N/A;    ENDOSCOPIC ULTRASOUND OF UPPER GASTROINTESTINAL TRACT N/A 07/21/2020    Procedure: ULTRASOUND, UPPER GI TRACT, ENDOSCOPIC;  Surgeon: Marcio Nguyễn III, MD;  Location: [facility] ENDO;  Service: Endoscopy;  Laterality: N/A;    ENDOSCOPIC ULTRASOUND OF UPPER GASTROINTESTINAL TRACT N/A 9/27/2024    Procedure: ULTRASOUND, UPPER GI TRACT, ENDOSCOPIC;  Surgeon: Marcio Nguyễn III, MD;  Location: [facility] ENDO;  Service: Endoscopy;  Laterality: N/A;    ESOPHAGOGASTRODUODENOSCOPY N/A 01/14/2022    Procedure: EGD (ESOPHAGOGASTRODUODENOSCOPY);  Surgeon: Abby Landers MD;  Location: [facility] ENDO;  Service: Endoscopy;  Laterality: N/A;    ESOPHAGOGASTRODUODENOSCOPY N/A 06/10/2022    Procedure: EGD (ESOPHAGOGASTRODUODENOSCOPY);  Surgeon: Abby Landers MD;  Location: [facility] ENDO;  Service: Endoscopy;  Laterality: N/A;    EYE SURGERY      cataract    FLAP GRAFT, LOCAL Left 2/15/2024    Procedure: FLAP GRAFT, LOCAL;  Surgeon: Bulmaro Tellez MD;  Location: [facility] OR 2ND FLR;  Service: Orthopedics;  Laterality: Left;    HYSTERECTOMY      INTRAMEDULLARY RODDING OF TROCHANTER OF FEMUR Left 12/11/2018    Procedure: INSERTION, INTRAMEDULLARY SANTOS, FEMUR, TROCHANTER;  Surgeon: Eulalio De La Cruz MD;  Location: [facility]  OR;  Service: Orthopedics;  Laterality: Left;    IRRIGATION AND DEBRIDEMENT OF LOWER EXTREMITY Left 1/19/2024    Procedure: IRRIGATION AND DEBRIDEMENT, LOWER EXTREMITY;  Surgeon: Bulmaro Tellez MD;  Location: [facility] OR 2ND FLR;  Service: Orthopedics;  Laterality: Left;    IRRIGATION AND DEBRIDEMENT OF LOWER EXTREMITY Left 2/15/2024    Procedure: IRRIGATION AND DEBRIDEMENT, LOWER EXTREMITY;  Surgeon: Bulmaro Tellez MD;  Location: [facility] OR 2ND FLR;  Service: Orthopedics;  Laterality: Left;    LAPAROSCOPIC CHOLECYSTECTOMY N/A 9/8/2024    Procedure: CHOLECYSTECTOMY, LAPAROSCOPIC;  Surgeon: Cipriano Ambrosio MD;  Location: [facility] OR;  Service: General;  Laterality: N/A;    REPAIR OF EPIGASTRIC HERNIA N/A 12/7/2023    Procedure: REPAIR, HERNIA, EPIGASTRIC;  Surgeon: Vipul Escobar MD;  Location: [facility] OR;  Service: General;  Laterality: N/A;    SPINAL CORD STIMULATOR IMPLANT  09/18/2013    and removal    SPINE SURGERY  2006    lumbar L2-S1 decompression.    SPINE SURGERY      cervical decompression    TONSILLECTOMY         Review of patient's allergies indicates:  No Known Allergies    No current facility-administered medications on file prior to encounter.     Current Outpatient Medications on File Prior to Encounter   Medication Sig    acetaminophen (TYLENOL) 325 MG tablet Take 2 tablets (650 mg total) by mouth every 8 (eight) hours as needed for Pain (pain scale 1-4).    amlodipine-benazepril 5-20 mg (LOTREL) 5-20 mg per capsule Take 1 capsule by mouth once daily.    cyclobenzaprine (FLEXERIL) 10 MG tablet Take 1 tablet (10 mg total) by mouth 3 (three) times daily as needed for Muscle spasms. (Patient not taking: Reported on 4/23/2025)    docusate sodium (COLACE) 100 MG capsule Take 1 capsule (100 mg total) by mouth 2 (two) times daily.    famotidine (PEPCID) 20 MG tablet Take 20 mg by mouth daily as needed for Heartburn.    levETIRAcetam (KEPPRA) 500 MG Tab Take 1 tablet (500 mg total) by mouth 2 (two) times  daily.    multivitamin with minerals tablet Take 1 tablet by mouth once daily.    ondansetron (ZOFRAN-ODT) 4 MG TbDL Take 1 tablet (4 mg total) by mouth every 8 (eight) hours as needed (nausea, vomiting).    oxyCODONE-acetaminophen (PERCOCET)  mg per tablet Take 1 tablet by mouth every 4 (four) hours as needed for Pain. (Patient not taking: Reported on 4/23/2025)    pantoprazole (PROTONIX) 40 MG tablet Take 1 tablet (40 mg total) by mouth once daily.    pregabalin (LYRICA) 200 MG Cap Take 1 capsule (200 mg total) by mouth 2 (two) times daily.     Family History       Problem Relation (Age of Onset)    COPD Brother    Coronary artery disease Father    Depression Father    Diabetes Mother, Father, Sister, Brother    Heart disease Father    Hypertension Mother, Father    Irritable bowel syndrome Mother          Tobacco Use    Smoking status: Never    Smokeless tobacco: Never   Substance and Sexual Activity    Alcohol use: No    Drug use: No    Sexual activity: Not Currently     Review of Systems   Constitutional:  Negative for chills and fever.   Respiratory:  Negative for shortness of breath.    Cardiovascular:  Negative for chest pain.   Gastrointestinal:  Positive for diarrhea. Negative for abdominal pain, nausea and vomiting.   Genitourinary:  Negative for dysuria and hematuria.   Musculoskeletal:  Positive for arthralgias (left hip).   Neurological:  Negative for dizziness, syncope and light-headedness.     Objective:     Vital Signs (Most Recent):  Temp: 98.5 °F (36.9 °C) (07/25/25 2057)  Pulse: 93 (07/25/25 2326)  Resp: 16 (07/25/25 2305)  BP: (!) 160/78 (07/25/25 2326)  SpO2: 98 % (07/25/25 2326) Vital Signs (24h Range):  Temp:  [98.5 °F (36.9 °C)] 98.5 °F (36.9 °C)  Pulse:  [89-97] 93  Resp:  [16] 16  SpO2:  [96 %-100 %] 98 %  BP: (141-172)/(70-83) 160/78        There is no height or weight on file to calculate BMI.     Physical Exam  Vitals reviewed.   Constitutional:       General: She is awake. She is  not in acute distress.     Appearance: Normal appearance. She is not diaphoretic.   Cardiovascular:      Rate and Rhythm: Normal rate.      Heart sounds: Murmur heard.      No friction rub. No gallop.   Pulmonary:      Effort: Pulmonary effort is normal. No accessory muscle usage or respiratory distress.      Breath sounds: Normal breath sounds. No wheezing, rhonchi or rales.   Abdominal:      General: Abdomen is flat. Bowel sounds are normal.      Palpations: Abdomen is soft.      Tenderness: There is generalized abdominal tenderness. There is no guarding or rebound.   Musculoskeletal:      Right lower leg: No edema.      Left lower leg: No edema.      Comments: Left hip with significant tenderness to palpation on exam. No overlying skin changes or deformities appreciated. ROM decreased 2/2 pain.   Skin:     General: Skin is warm and dry.   Neurological:      Mental Status: She is alert and oriented to person, place, and time.      Comments: A&O x 3 to person, place, and month/yr   Psychiatric:         Behavior: Behavior is cooperative.                Significant Labs: All pertinent labs within the past 24 hours have been reviewed.  CBC:   Recent Labs   Lab 07/25/25  2041   WBC 9.25   HGB 8.5*   HCT 28.0*   *     CMP:   Recent Labs   Lab 07/25/25  2041   *   K 3.4*      CO2 25   *   BUN 9   CREATININE 0.5   CALCIUM 9.4   PROT 6.7   ALBUMIN 3.2*   BILITOT 0.4   ALKPHOS 104   AST 9*   ALT 6*   ANIONGAP 6*     Lipase:   Recent Labs   Lab 07/25/25  2041   LIPASE <3*     Urine Studies:   Recent Labs   Lab 07/25/25 2148   APPEARANCEUA Clear   SPECGRAV >=1.030*   PROTEINUA 1+*   BILIRUBINUA Negative   UROBILINOGEN Negative   LEUKOCYTESUR Negative   RBCUA 1   WBCUA 2       Significant Imaging:   Imaging Results              CT Hip With Contrast Left (Final result)  Result time 07/25/25 22:22:58      Final result by Rolando Jon MD (07/25/25 22:22:58)                   Impression:       Superior posterior migration of the head of the left proximal femoral prosthesis into the left ilium, a finding which is not significantly changed when compared to imaging from November 2024.  No evidence of periprosthetic fracture or hardware complication associated with the left proximal femoral prosthesis.      Electronically signed by: Rolando Jon  Date:    07/25/2025  Time:    22:22               Narrative:    EXAMINATION:  CT HIP WITH CONTRAST LEFT    CLINICAL HISTORY:  Septic arthritis suspected, hip, xray done;    TECHNIQUE:  Axial CT images were obtained of the left hip after the administration of 100 mL of Omnipaque 350 intravenous contrast.  Sagittal and coronal reformats were performed.    COMPARISON:  CT abdomen pelvis from 11/27/2024.    FINDINGS:  There is superior posterior migration of the head of the left proximal femoral prosthesis into the ilium by proximally 2.5 cm.  No evidence of periprosthetic fracture or hardware complication.  A left hip joint effusion is present posteriorly with osseous loose bodies noted dependently.  For findings regarding the pelvic viscera, see dedicated concurrent CT abdomen pelvis.  No evidence of soft tissue or osseous mass.                                       CT Abdomen Pelvis With IV Contrast NO Oral Contrast (Final result)  Result time 07/25/25 22:13:50      Final result by Rolando Jon MD (07/25/25 22:13:50)                   Impression:      Pancolitis most prominently involving the cecum and ascending colon.      Electronically signed by: Rolando Jon  Date:    07/25/2025  Time:    22:13               Narrative:    EXAMINATION:  CT ABDOMEN PELVIS WITH IV CONTRAST    CLINICAL HISTORY:  Abdominal abscess/infection suspected;    TECHNIQUE:  Low dose axial images, sagittal and coronal reformations were obtained from the lung bases to the pubic symphysis following the IV administration of 100 mL of Omnipaque 350.    COMPARISON:  CT abdomen  pelvis from 07/04/2025.    FINDINGS:  Lung bases: No worrisome pulmonary nodules or focal consolidations are identified.  A calcified granuloma is noted at the left lung base.  A small hiatal hernia is present.    Liver: No focal mass.  Small hepatic cysts are present.    Gallbladder: The gallbladder is surgically absent.    Bile Ducts: Prominent diameter of the common bile duct and intrahepatic biliary ducts are stable, likely representing sequela of reservoir effect.    Spleen: Unremarkable.    Kidneys: No renal mass, calcification, or hydronephrosis.    Adrenals: Unremarkable.    Pancreas: There is pancolitis which most prominently involves the cecum and ascending colon    Bowel: No evidence of bowel obstruction or inflammation.    Lymph nodes: No evidence of lymphadenopathy involving the abdomen or pelvis.    Vascular: The abdominal aorta is normal in diameter.    Pelvic organs: No evidence of pelvic mass.    Urinary Bladder: Unremarkable.    Bones:  No evidence of acute osseous process.  The appearance of the scoliotic spine is stable.    Abdominal wall:  Unremarkable.    Other: None.                                       X-Ray Hip 2 or 3 views Left with Pelvis when performed (Final result)  Result time 07/25/25 20:45:26      Final result by Rolando Jon MD (07/25/25 20:45:26)                   Impression:      No evidence of acute osseous process or hardware complication involving the pelvis or bilateral hips.      Electronically signed by: Rolando Jon  Date:    07/25/2025  Time:    20:45               Narrative:    EXAMINATION:  XR HIP WITH PELVIS WHEN PERFORMED 2 OR 3 VIEWS LEFT    CLINICAL HISTORY:  Pain in unspecified hip    TECHNIQUE:  Three views of the pelvis and left hip were obtained.    COMPARISON:  Radiographs of the pelvis and right hip from 04/12/2025.    FINDINGS:  No evidence of acute fracture or dislocation involving the pelvis or bilateral hips.  A proximal left femoral  prosthesis is present without evidence of periprosthetic fracture or hardware complication.  No evidence of subcutaneous emphysema or radiopaque foreign body.  No evidence of soft tissue or osseous mass.                                     Assessment/Plan:     Assessment & Plan  Pancolitis  - CTAP performed in the ED with pancolitis  - Pt with known hx of diverticulitis  - GI consulted, appreciate recs  - NPO at midnight  - Hx of long term abx use  - C Diff and GI PCR pending  - Empiric therapy with ceftriaxone and flagyl  - PRN analgesia  - Symptomatic care    Left hip pain  - Patient presented to the ED with left hip pain  - Left hip xray shows no evidence of acute osseous process or hardware complication involving the pelvis or bilateral hips.  - CT left hip shows superior posterior migration of the head of the left proximal femoral prosthesis into the left ilium, a finding which is not significantly changed when compared to imaging from November 2024.  No evidence of periprosthetic fracture or hardware complication associated with the left proximal femoral prosthesis.   - Hx of MRSA arthritis of left hip  - ED provider worried about possible recurrence of septic arthritis  - Ortho consulted, appreciate recs  - NPO at midnight  - Empiric abx therapy with vancomycin  - PRN analgeisa  - Symptomatic care  Chronic pain with uncomplicated opioid dependence  - Hx noted  -  reviewed  - Continue home percocet for severe pain  - Naloxone PRN    Seizure disorder  - Hx noted  - Continue home keppra  - Seizure precautions    Paroxysmal atrial fibrillation  Patient has paroxysmal (<7 days) atrial fibrillation. Patient is currently in sinus rhythm. [id number] Score: 3. The patients heart rate in the last 24 hours is as follows:  Pulse  Min: 89  Max: 97     Antiarrhythmics       Anticoagulants  enoxaparin injection 40 mg, Every 24 hours, Subcutaneous    Plan  - Replete lytes with a goal of K>4, Mg >2  - Patient is not  "anticoagulated due to not on long term anticoag per home med list  - Patient's afib is currently controlled  Chronic heart failure with preserved ejection fraction  Patient has Diastolic (HFpEF) heart failure that is Chronic. On presentation their CHF was well compensated. Most recent BNP and echo results are listed below.  No results for input(s): "BNP" in the last 72 hours.  Latest ECHO  Results for orders placed during the hospital encounter of 01/17/24    Echo    Interpretation Summary    Left Ventricle: The left ventricle is normal in size. Normal wall thickness. There is concentric remodeling. Normal wall motion. There is low normal systolic function with a visually estimated ejection fraction of 50 - 55%. There is normal diastolic function.    Right Ventricle: Normal right ventricular cavity size. Wall thickness is normal. Right ventricle wall motion  is normal. Systolic function is normal.    Aortic Valve: There is moderate aortic valve sclerosis. There is moderate annular calcification present.    Mitral Valve: There is severe mitral annular calcification present.    Aorta: Aortic root is normal in size measuring 3.37 cm. Ascending aorta is normal measuring 3.51 cm.    Pulmonary Artery: The estimated pulmonary artery systolic pressure is 25 mmHg.    IVC/SVC: Normal venous pressure at 3 mmHg.    Current Heart Failure Medications       Plan  - Monitor strict I&Os and daily weights.    - Place on telemetry  - Low sodium diet  - Place on fluid restriction of 1.5 L.   - Cardiology has not been consulted  - The patient's volume status is at their baseline  Hypertension  Chronic, controlled.  Latest blood pressure and vitals reviewed-     Temp:  [98.5 °F (36.9 °C)]   Pulse:  [89-97]   Resp:  [16]   BP: (141-172)/(70-83)   SpO2:  [96 %-100 %] .   Home meds for hypertension were reviewed and noted below-  Hypertension Medications              amlodipine-benazepril 5-20 mg (LOTREL) 5-20 mg per capsule Take 1 capsule " by mouth once daily.            While in the hospital, will manage blood pressure as follows; Adjust home antihypertensive regimen as follows- Continue home amlodipine with parameters. Do not have benazepril on formulary, will hold for now    Will utilize p.r.n. blood pressure medication only if patient's blood pressure greater than 180/110 and she develops symptoms such as worsening chest pain or shortness of breath.    VTE Risk Mitigation (From admission, onward)           Ordered     enoxaparin injection 40 mg  Daily         07/25/25 2316     IP VTE HIGH RISK PATIENT  Once         07/25/25 2316     Place sequential compression device  Until discontinued         07/25/25 2316                      On 07/26/2025, patient should be placed in hospital observation services under my care in collaboration with Nito Gill MD.           Terri Singer PA-C  Department of Hospital [facility]  [facility]

## 2025-07-26 NOTE — ED PROVIDER NOTES
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  She says it hurts all the time.  She denies fever or chills chest pain shortness of breath belly pain.  She reports nausea and decreased p.o. intake.  She denies vomiting she denies change in bowel or bladder habits.  Review of patient's allergies indicates:  No Known Allergies  Past Medical History:   Diagnosis Date    Anemia     Arthritis     Bleeding ulcer 07/2016    Chronic pain     DDD (degenerative disc disease), cervical     DDD (degenerative disc disease), lumbar     Depression     Disc degeneration, lumbosacral     Diverticulitis     Encounter for blood transfusion     Hypertension     IBS (irritable bowel syndrome)     Neuropathy of both feet     Seizures     none  since 2017    Umbilical hernia 2020     Past Surgical History:   Procedure Laterality Date    ARTHROPLASTY, HIP, GIRDLESTONE, POSTERIOR APPROACH Left 1/19/2024    Procedure: ARTHROPLASTY, HIP, GIRDLESTONE, POSTERIOR APPROACH;  Surgeon: Bulmaro Tellez MD;  Location: [facility] OR [address] [place] [address];  Service: Orthopedics;  Laterality: Left;    ARTHROPLASTY, HIP, GIRDLESTONE, POSTERIOR APPROACH Left 1/25/2024    Procedure: Irrigation and debridement left hip,;  Surgeon: Juanito Painting MD;  Location: [facility] OR [address] [place] [address];  Service: Orthopedics;  Laterality: Left;    ARTHROPLASTY, HIP, GIRDLESTONE, POSTERIOR APPROACH Left 2/15/2024    Procedure: Revision hip antibiotic spacer head exchange, left, lateral, peg board, depuy osteomed in room, vanc, ancef, txa,;  Surgeon: Bulmaro Tellez MD;  Location: [facility] OR [address] [place] [address];  Service: Orthopedics;  Laterality: Left;    BACK SURGERY       BREAST BIOPSY      BREAST SURGERY Right     lumpectomy    CAUDAL EPIDURAL STEROID INJECTION N/A 01/28/2022    Procedure: Injection-steroid-epidural-caudal;  Surgeon: Luzmaria Marcelino MD;  Location: [facility] OR;  Service: Pain Management;  Laterality: N/A;    COLONOSCOPY N/A 01/14/2022    Procedure: COLONOSCOPY;  Surgeon: Abby Landers MD;  Location: [facility] ENDO;  Service: Endoscopy;  Laterality: N/A;    COLONOSCOPY N/A 11/30/2024    Procedure: COLONOSCOPY;  Surgeon: Marcio Nguyễn III, MD;  Location: [facility] ENDO;  Service: Endoscopy;  Laterality: N/A;    ENDOSCOPIC ULTRASOUND OF UPPER GASTROINTESTINAL TRACT N/A 07/21/2020    Procedure: ULTRASOUND, UPPER GI TRACT, ENDOSCOPIC;  Surgeon: Marcio Nguyễn III, MD;  Location: [facility] ENDO;  Service: Endoscopy;  Laterality: N/A;    ENDOSCOPIC ULTRASOUND OF UPPER GASTROINTESTINAL TRACT N/A 9/27/2024    Procedure: ULTRASOUND, UPPER GI TRACT, ENDOSCOPIC;  Surgeon: Marcio Nguyễn III, MD;  Location: [facility];  Service: Endoscopy;  Laterality: N/A;    ESOPHAGOGASTRODUODENOSCOPY N/A 01/14/2022    Procedure: EGD (ESOPHAGOGASTRODUODENOSCOPY);  Surgeon: Abby Landers MD;  Location: [facility] ENDO;  Service: Endoscopy;  Laterality: N/A;    ESOPHAGOGASTRODUODENOSCOPY N/A 06/10/2022    Procedure: EGD (ESOPHAGOGASTRODUODENOSCOPY);  Surgeon: Abby Landers MD;  Location: [facility] ENDO;  Service: Endoscopy;  Laterality: N/A;    EYE SURGERY      cataract    FLAP GRAFT, LOCAL Left 2/15/2024    Procedure: FLAP GRAFT, LOCAL;  Surgeon: Bulmaro Tellez MD;  Location: [facility] OR [facility] FLR;  Service: Orthopedics;  Laterality: Left;    HYSTERECTOMY      INTRAMEDULLARY RODDING OF TROCHANTER OF FEMUR Left 12/11/2018    Procedure: INSERTION, INTRAMEDULLARY SANTOS, FEMUR, TROCHANTER;  Surgeon: Eulalio De La Cruz MD;  Location: [facility] OR;  Service: Orthopedics;  Laterality: Left;    IRRIGATION AND DEBRIDEMENT OF LOWER EXTREMITY Left 1/19/2024    Procedure: IRRIGATION AND DEBRIDEMENT, LOWER  EXTREMITY;  Surgeon: Bulmaro Tellez MD;  Location: [facility] OR 2ND FLR;  Service: Orthopedics;  Laterality: Left;    IRRIGATION AND DEBRIDEMENT OF LOWER EXTREMITY Left 2/15/2024    Procedure: IRRIGATION AND DEBRIDEMENT, LOWER EXTREMITY;  Surgeon: Bulmaro Tellez MD;  Location: [facility] OR 2ND FLR;  Service: Orthopedics;  Laterality: Left;    LAPAROSCOPIC CHOLECYSTECTOMY N/A 9/8/2024    Procedure: CHOLECYSTECTOMY, LAPAROSCOPIC;  Surgeon: Cipriano Ambrosio MD;  Location: [facility] OR;  Service: General;  Laterality: N/A;    REPAIR OF EPIGASTRIC HERNIA N/A 12/7/2023    Procedure: REPAIR, HERNIA, EPIGASTRIC;  Surgeon: Vipul Escobar MD;  Location: [facility] OR;  Service: General;  Laterality: N/A;    SPINAL CORD STIMULATOR IMPLANT  09/18/2013    and removal    SPINE SURGERY  2006    lumbar L2-S1 decompression.    SPINE SURGERY      cervical decompression    TONSILLECTOMY       Family History   Problem Relation Name Age of Onset    Diabetes Mother      Hypertension Mother      Irritable bowel syndrome Mother      Diabetes Father          insulin dependent    Hypertension Father      Heart disease Father      Coronary artery disease Father      Depression Father      Diabetes Sister      Diabetes Brother      COPD Brother       Social History[1]  Review of Systems   All other systems reviewed and are negative.      Physical Exam     Initial Vitals   BP Pulse Resp Temp SpO2   07/25/25 2006 07/25/25 2006 07/25/25 2006 07/25/25 2057 07/25/25 2006   (!) 148/83 96 16 98.5 °F (36.9 °C) 96 %      MAP       --                Physical Exam    Nursing note and vitals reviewed.  Constitutional: She appears well-developed. She is not diaphoretic.   HENT:   Head: Normocephalic and atraumatic.   Eyes: Right eye exhibits no discharge. Left eye exhibits no discharge.   Neck: No tracheal deviation present.   Cardiovascular:  Normal rate, regular rhythm and intact distal pulses.           Pulmonary/Chest: Breath sounds normal. No stridor. No  respiratory distress.   Abdominal: Abdomen is soft. There is no abdominal tenderness.   Abdominal wall hernia soft reducible nontender   Musculoskeletal:         General: Tenderness present.      Comments: Tenderness to the left hip without swelling redness or gross deformity,     Neurological: She is alert and oriented to person, place, and time.   Skin: Skin is warm and dry.         ED Course   Procedures  Labs Reviewed   COMPREHENSIVE METABOLIC PANEL - Abnormal       Result Value    Sodium 135 (*)     Potassium 3.4 (*)     Chloride 104      CO2 25      Glucose 123 (*)     BUN 9      Creatinine 0.5      Calcium 9.4      Protein Total 6.7      Albumin 3.2 (*)     Bilirubin Total 0.4            AST 9 (*)     ALT 6 (*)     Anion Gap 6 (*)     eGFR >60     LIPASE - Abnormal    Lipase Level <3 (*)    URINALYSIS, REFLEX TO URINE CULTURE - Abnormal    Color, UA Yellow      Appearance, UA Clear      Spec Grav UA >=1.030 (*)     pH, UA 7.0      Protein, UA 1+ (*)     Glucose, UA Negative      Ketones, UA 1+ (*)     Blood, UA Negative      Bilirubin, UA Negative      Urobilinogen, UA Negative      Nitrites, UA Negative      Leukocyte Esterase, UA Negative     C-REACTIVE PROTEIN - Abnormal    CRP 27.60 (*)    CBC WITH DIFFERENTIAL - Abnormal    WBC 9.25      RBC 3.53 (*)     Hgb 8.5 (*)     Hct 28.0 (*)     MCV 79 (*)     MCH 24.1 (*)     MCHC 30.4 (*)     RDW 15.8 (*)     Platelet Count 452 (*)     MPV 9.7      Nucleated RBC 0      Neut % 87.4 (*)     Lymph % 5.8 (*)     Mono % 6.1      Eos % 0.0      Basophil % 0.1      Imm Grans % 0.6 (*)     Neut # 8.1 (*)     Lymph # 0.54 (*)     Mono # 0.56      Eos # 0.00      Baso # 0.01      Imm Grans # 0.06 (*)    CLOSTRIDIOIDES DIFFICILE   CBC W/ AUTO DIFFERENTIAL    Narrative:     The following orders were created for panel order CBC W/ AUTO DIFFERENTIAL.  Procedure                               Abnormality         Status                     ---------                                -----------         ------                     CBC with Differential[[id number]]       Abnormal            Final result                 Please view results for these tests on the individual orders.   URINALYSIS MICROSCOPIC    RBC, UA 1      WBC, UA 2      Microscopic Comment       GASTROINTESTINAL PATHOGENS PANEL, PCR   ISTAT CREATININE    POC Creatinine 0.6      Sample VENOUS            Imaging Results              CT Hip With Contrast Left (Final result)  Result time 07/25/25 22:22:58      Final result by Rolando Jon MD (07/25/25 22:22:58)                   Impression:      Superior posterior migration of the head of the left proximal femoral prosthesis into the left ilium, a finding which is not significantly changed when compared to imaging from November 2024.  No evidence of periprosthetic fracture or hardware complication associated with the left proximal femoral prosthesis.      Electronically signed by: Rolando Jon  Date:    07/25/2025  Time:    22:22               Narrative:    EXAMINATION:  CT HIP WITH CONTRAST LEFT    CLINICAL HISTORY:  Septic arthritis suspected, hip, xray done;    TECHNIQUE:  Axial CT images were obtained of the left hip after the administration of 100 mL of Omnipaque 350 intravenous contrast.  Sagittal and coronal reformats were performed.    COMPARISON:  CT abdomen pelvis from 11/27/2024.    FINDINGS:  There is superior posterior migration of the head of the left proximal femoral prosthesis into the ilium by proximally 2.5 cm.  No evidence of periprosthetic fracture or hardware complication.  A left hip joint effusion is present posteriorly with osseous loose bodies noted dependently.  For findings regarding the pelvic viscera, see dedicated concurrent CT abdomen pelvis.  No evidence of soft tissue or osseous mass.                                       CT Abdomen Pelvis With IV Contrast NO Oral Contrast (Final result)  Result time 07/25/25 22:13:50      Final  result by Rolando Jon MD (07/25/25 22:13:50)                   Impression:      Pancolitis most prominently involving the cecum and ascending colon.      Electronically signed by: Rolando Jon  Date:    07/25/2025  Time:    22:13               Narrative:    EXAMINATION:  CT ABDOMEN PELVIS WITH IV CONTRAST    CLINICAL HISTORY:  Abdominal abscess/infection suspected;    TECHNIQUE:  Low dose axial images, sagittal and coronal reformations were obtained from the lung bases to the pubic symphysis following the IV administration of 100 mL of Omnipaque 350.    COMPARISON:  CT abdomen pelvis from 07/04/2025.    FINDINGS:  Lung bases: No worrisome pulmonary nodules or focal consolidations are identified.  A calcified granuloma is noted at the left lung base.  A small hiatal hernia is present.    Liver: No focal mass.  Small hepatic cysts are present.    Gallbladder: The gallbladder is surgically absent.    Bile Ducts: Prominent diameter of the common bile duct and intrahepatic biliary ducts are stable, likely representing sequela of reservoir effect.    Spleen: Unremarkable.    Kidneys: No renal mass, calcification, or hydronephrosis.    Adrenals: Unremarkable.    Pancreas: There is pancolitis which most prominently involves the cecum and ascending colon    Bowel: No evidence of bowel obstruction or inflammation.    Lymph nodes: No evidence of lymphadenopathy involving the abdomen or pelvis.    Vascular: The abdominal aorta is normal in diameter.    Pelvic organs: No evidence of pelvic mass.    Urinary Bladder: Unremarkable.    Bones:  No evidence of acute osseous process.  The appearance of the scoliotic spine is stable.    Abdominal wall:  Unremarkable.    Other: None.                                       X-Ray Hip 2 or 3 views Left with Pelvis when performed (Final result)  Result time 07/25/25 20:45:26      Final result by Rolando Jon MD (07/25/25 20:45:26)                   Impression:      No  evidence of acute osseous process or hardware complication involving the pelvis or bilateral hips.      Electronically signed by: Rolando Jon  Date:    07/25/2025  Time:    20:45               Narrative:    EXAMINATION:  XR HIP WITH PELVIS WHEN PERFORMED 2 OR 3 VIEWS LEFT    CLINICAL HISTORY:  Pain in unspecified hip    TECHNIQUE:  Three views of the pelvis and left hip were obtained.    COMPARISON:  Radiographs of the pelvis and right hip from 04/12/2025.    FINDINGS:  No evidence of acute fracture or dislocation involving the pelvis or bilateral hips.  A proximal left femoral prosthesis is present without evidence of periprosthetic fracture or hardware complication.  No evidence of subcutaneous emphysema or radiopaque foreign body.  No evidence of soft tissue or osseous mass.                                       Medications   sodium chloride 0.9% flush 10 mL (has no administration in time range)   melatonin tablet 6 mg (6 mg Oral Given 7/26/25 0112)   ondansetron injection 4 mg (has no administration in time range)   aluminum-magnesium hydroxide-simethicone 200-200-20 mg/5 mL suspension 30 mL (has no administration in time range)   acetaminophen tablet 650 mg (has no administration in time range)   naloxone 0.4 mg/mL injection 0.02 mg (has no administration in time range)   potassium bicarbonate disintegrating tablet 50 mEq (has no administration in time range)   potassium bicarbonate disintegrating tablet 35 mEq (has no administration in time range)   potassium bicarbonate disintegrating tablet 60 mEq (has no administration in time range)   magnesium oxide tablet 800 mg (has no administration in time range)   magnesium oxide tablet 800 mg (has no administration in time range)   potassium, sodium phosphates 280-160-250 mg packet 2 packet (has no administration in time range)   potassium, sodium phosphates 280-160-250 mg packet 2 packet (has no administration in time range)   potassium, sodium phosphates  280-160-250 mg packet 2 packet (has no administration in time range)   glucose chewable tablet 16 g (has no administration in time range)   glucose chewable tablet 24 g (has no administration in time range)   dextrose 50% injection 12.5 g (has no administration in time range)   dextrose 50% injection 25 g (has no administration in time range)   glucagon (human recombinant) injection 1 mg (has no administration in time range)   enoxaparin injection 40 mg (has no administration in time range)   HYDROcodone-acetaminophen  mg per tablet 1 tablet (1 tablet Oral Given 7/26/25 0112)   cefTRIAXone injection 1 g (1 g Intravenous Given 7/26/25 0007)   metronidazole IVPB 500 mg (500 mg Intravenous New Bag 7/26/25 0109)   vancomycin - pharmacy to dose (has no administration in time range)   amLODIPine tablet 5 mg (has no administration in time range)   famotidine tablet 20 mg (has no administration in time range)   levETIRAcetam tablet 500 mg (has no administration in time range)   multivitamin tablet (has no administration in time range)   pregabalin capsule 200 mg (has no administration in time range)   morphine injection 2 mg (2 mg Intravenous Given 7/25/25 2045)   ondansetron injection 4 mg (4 mg Intravenous Given 7/25/25 2045)   morphine injection 2 mg (2 mg Intravenous Given 7/25/25 2148)   iohexoL (OMNIPAQUE 350) injection 100 mL (100 mLs Intravenous Given 7/25/25 2132)   morphine injection 2 mg (2 mg Intravenous Given 7/25/25 2305)     Medical Decision Making  Atraumatic left hip pain, her vital signs she is not tachy she is normotensive, her temperature is normal, her hip is not obviously deformed, it is not red or swollen, with a history of septic arthritis this is 1 of my major concerns as well as occult fracture.  She has a equal dorsal pedis pulses bilaterally.  Her compartments in her leg are soft. I will get an x-ray I will scan her belly she reported belly pain to EMS but is not reporting it to me.  I will  give her some pain medicine.  I anticipate she will need to be admitted.    She required multiple rounds of opioid pain medicine and control her pain but she in the end was comfortable, she has this pain colitis on her CT scan but she does not really have the symptoms of a colitis.  I will defer antibiotics at this time with a benign abdominal exam and her not complaining of abdominal pain.  Discussed with hospital medicine, she will get an orthopedic consult in the morning.    Amount and/or Complexity of Data Reviewed  Independent Historian: EMS     Details: CBG in the 130s  External Data Reviewed: notes.     Details: Reviewed orthopedic note from 2024 when she had septic arthritis  Labs: ordered.  Radiology: ordered.    Risk  Prescription drug management.  Parenteral controlled substances.  Decision regarding hospitalization.               ED Course as of 07/26/25 0122 Fri Jul 25, 2025 2039 She is not septic I do not think she needs emergent antibiotics [IC]   2043 On my independent interpretation of her left hip x-ray I note prior prosthetic hardware in place, no obvious periprosthetic fracture it appears similar to her prior x-ray [IC]      ED Course User Index  [IC] Niranjan Mac MD                               Clinical Impression:  Final diagnoses:  [M25.559] Hip pain  [R11.0] Nausea (Primary)  [K51.00] Pancolitis          ED Disposition Condition    Observation                       [1]   Social History  Tobacco Use    Smoking status: Never    Smokeless tobacco: Never   Substance Use Topics    Alcohol use: No    Drug use: No        Niranjan Mac MD  07/26/25 0122

## 2025-07-27 LAB
ABO + RH BLD: NORMAL
ABSOLUTE EOSINOPHIL (SMH): 0.05 K/UL
ABSOLUTE MONOCYTE (SMH): 0.57 K/UL (ref 0.3–1)
ABSOLUTE NEUTROPHIL COUNT (SMH): 3.3 K/UL (ref 1.8–7.7)
ADV 40+41 DNA STL QL NAA+NON-PROBE: NOT DETECTED
ALBUMIN SERPL-MCNC: 2.8 G/DL (ref 3.5–5.2)
ALP SERPL-CCNC: 77 UNIT/L (ref 55–135)
ALT SERPL-CCNC: 5 UNIT/L (ref 10–44)
ANION GAP (SMH): 3 MMOL/L (ref 8–16)
AST SERPL-CCNC: 9 UNIT/L (ref 10–40)
ASTRO TYP 1-8 RNA STL QL NAA+NON-PROBE: NOT DETECTED
BASOPHILS # BLD AUTO: 0.03 K/UL
BASOPHILS NFR BLD AUTO: 0.6 %
BILIRUB SERPL-MCNC: 0.3 MG/DL (ref 0.1–1)
BLD PROD TYP BPU: NORMAL
BLOOD UNIT EXPIRATION DATE: NORMAL
BLOOD UNIT TYPE CODE: 7300
BUN SERPL-MCNC: 5 MG/DL (ref 8–23)
C CAYETANENSIS DNA STL QL NAA+NON-PROBE: NOT DETECTED
C COLI+JEJ+UPSA DNA STL QL NAA+NON-PROBE: NOT DETECTED
C DIFF GDH STL QL: NEGATIVE
C DIFF TOX A+B STL QL IA: NEGATIVE
CALCIUM SERPL-MCNC: 8.1 MG/DL (ref 8.7–10.5)
CHLORIDE SERPL-SCNC: 105 MMOL/L (ref 95–110)
CO2 SERPL-SCNC: 27 MMOL/L (ref 23–29)
CREAT SERPL-MCNC: 0.6 MG/DL (ref 0.5–1.4)
CROSSMATCH INTERPRETATION: NORMAL
CRYPTOSP DNA STL QL NAA+NON-PROBE: NOT DETECTED
DISPENSE STATUS: NORMAL
E HISTOLYT DNA STL QL NAA+NON-PROBE: NOT DETECTED
EAEC PAA PLAS AGGR+AATA ST NAA+NON-PRB: NOT DETECTED
EC STX1+STX2 GENES STL QL NAA+NON-PROBE: NOT DETECTED
EPEC EAE GENE STL QL NAA+NON-PROBE: NOT DETECTED
ERYTHROCYTE [DISTWIDTH] IN BLOOD BY AUTOMATED COUNT: 15.9 % (ref 11.5–14.5)
ETEC LTA+ST1A+ST1B TOX ST NAA+NON-PROBE: NOT DETECTED
G LAMBLIA DNA STL QL NAA+NON-PROBE: NOT DETECTED
GFR SERPLBLD CREATININE-BSD FMLA CKD-EPI: >60 ML/MIN/1.73/M2
GLUCOSE SERPL-MCNC: 104 MG/DL (ref 70–110)
HCT VFR BLD AUTO: 22 % (ref 37–48.5)
HGB BLD-MCNC: 6.8 GM/DL (ref 12–16)
IMM GRANULOCYTES # BLD AUTO: 0.02 K/UL (ref 0–0.04)
IMM GRANULOCYTES NFR BLD AUTO: 0.4 % (ref 0–0.5)
INDIRECT COOMBS: NORMAL
LYMPHOCYTES # BLD AUTO: 0.91 K/UL (ref 1–4.8)
MAGNESIUM SERPL-MCNC: 1.5 MG/DL (ref 1.6–2.6)
MCH RBC QN AUTO: 24.1 PG (ref 27–31)
MCHC RBC AUTO-ENTMCNC: 30.9 G/DL (ref 32–36)
MCV RBC AUTO: 78 FL (ref 82–98)
NOROVIRUS GI+II RNA STL QL NAA+NON-PROBE: NOT DETECTED
NUCLEATED RBC (/100WBC) (SMH): 0 /100 WBC
P SHIGELLOIDES DNA STL QL NAA+NON-PROBE: NOT DETECTED
PHOSPHATE SERPL-MCNC: 3.1 MG/DL (ref 2.7–4.5)
PLATELET # BLD AUTO: 345 K/UL (ref 150–450)
PMV BLD AUTO: 9.5 FL (ref 9.2–12.9)
POTASSIUM SERPL-SCNC: 3.7 MMOL/L (ref 3.5–5.1)
PROT SERPL-MCNC: 5.4 GM/DL (ref 6–8.4)
RBC # BLD AUTO: 2.82 M/UL (ref 4–5.4)
RELATIVE EOSINOPHIL (SMH): 1 % (ref 0–8)
RELATIVE LYMPHOCYTE (SMH): 18.7 % (ref 18–48)
RELATIVE MONOCYTE (SMH): 11.7 % (ref 4–15)
RELATIVE NEUTROPHIL (SMH): 67.6 % (ref 38–73)
RH BLD: NORMAL
RVA RNA STL QL NAA+NON-PROBE: NOT DETECTED
S ENT+BONG DNA STL QL NAA+NON-PROBE: NOT DETECTED
SAPO I+II+IV+V RNA STL QL NAA+NON-PROBE: NOT DETECTED
SHIGELLA SP+EIEC IPAH ST NAA+NON-PROBE: NOT DETECTED
SODIUM SERPL-SCNC: 135 MMOL/L (ref 136–145)
SPECIMEN OUTDATE: NORMAL
UNIT NUMBER: NORMAL
V CHOL+PARA+VUL DNA STL QL NAA+NON-PROBE: NOT DETECTED
V CHOLERAE DNA STL QL NAA+NON-PROBE: NOT DETECTED
WBC # BLD AUTO: 4.87 K/UL (ref 3.9–12.7)
Y ENTEROCOL DNA STL QL NAA+NON-PROBE: NOT DETECTED

## 2025-07-27 PROCEDURE — 83735 ASSAY OF MAGNESIUM: CPT

## 2025-07-27 PROCEDURE — 36415 COLL VENOUS BLD VENIPUNCTURE: CPT

## 2025-07-27 PROCEDURE — 11000001 HC ACUTE MED/SURG PRIVATE ROOM

## 2025-07-27 PROCEDURE — 82040 ASSAY OF SERUM ALBUMIN: CPT

## 2025-07-27 PROCEDURE — 84100 ASSAY OF PHOSPHORUS: CPT

## 2025-07-27 PROCEDURE — 85025 COMPLETE CBC W/AUTO DIFF WBC: CPT

## 2025-07-27 PROCEDURE — 30233N1 TRANSFUSION OF NONAUTOLOGOUS RED BLOOD CELLS INTO PERIPHERAL VEIN, PERCUTANEOUS APPROACH: ICD-10-PCS | Performed by: HOSPITALIST

## 2025-07-27 PROCEDURE — 86920 COMPATIBILITY TEST SPIN: CPT | Performed by: NURSE PRACTITIONER

## 2025-07-27 PROCEDURE — 36415 COLL VENOUS BLD VENIPUNCTURE: CPT | Performed by: NURSE PRACTITIONER

## 2025-07-27 PROCEDURE — 36430 TRANSFUSION BLD/BLD COMPNT: CPT | Performed by: NURSE PRACTITIONER

## 2025-07-27 PROCEDURE — 25000003 PHARM REV CODE 250: Performed by: STUDENT IN AN ORGANIZED HEALTH CARE EDUCATION/TRAINING PROGRAM

## 2025-07-27 PROCEDURE — 63600175 PHARM REV CODE 636 W HCPCS

## 2025-07-27 PROCEDURE — 25000003 PHARM REV CODE 250: Performed by: NURSE PRACTITIONER

## 2025-07-27 PROCEDURE — 97530 THERAPEUTIC ACTIVITIES: CPT | Mod: CQ

## 2025-07-27 PROCEDURE — 25000003 PHARM REV CODE 250

## 2025-07-27 PROCEDURE — 86901 BLOOD TYPING SEROLOGIC RH(D): CPT | Performed by: NURSE PRACTITIONER

## 2025-07-27 PROCEDURE — 27000207 HC ISOLATION

## 2025-07-27 PROCEDURE — 63600175 PHARM REV CODE 636 W HCPCS: Performed by: NURSE PRACTITIONER

## 2025-07-27 PROCEDURE — P9016 RBC LEUKOCYTES REDUCED: HCPCS | Performed by: NURSE PRACTITIONER

## 2025-07-27 RX ORDER — MUPIROCIN 20 MG/G
OINTMENT TOPICAL 2 TIMES DAILY
Status: DISCONTINUED | OUTPATIENT
Start: 2025-07-27 | End: 2025-07-28 | Stop reason: HOSPADM

## 2025-07-27 RX ORDER — HYDROCODONE BITARTRATE AND ACETAMINOPHEN 500; 5 MG/1; MG/1
TABLET ORAL
Status: DISCONTINUED | OUTPATIENT
Start: 2025-07-27 | End: 2025-07-28 | Stop reason: HOSPADM

## 2025-07-27 RX ORDER — MAGNESIUM SULFATE HEPTAHYDRATE 40 MG/ML
2 INJECTION, SOLUTION INTRAVENOUS ONCE
Status: COMPLETED | OUTPATIENT
Start: 2025-07-27 | End: 2025-07-27

## 2025-07-27 RX ADMIN — OXYCODONE HYDROCHLORIDE AND ACETAMINOPHEN 1 TABLET: 10; 325 TABLET ORAL at 09:07

## 2025-07-27 RX ADMIN — MUPIROCIN 1 G: 20 OINTMENT TOPICAL at 09:07

## 2025-07-27 RX ADMIN — LEVETIRACETAM 500 MG: 500 TABLET, FILM COATED ORAL at 08:07

## 2025-07-27 RX ADMIN — OXYCODONE HYDROCHLORIDE AND ACETAMINOPHEN 1 TABLET: 10; 325 TABLET ORAL at 03:07

## 2025-07-27 RX ADMIN — PREGABALIN 200 MG: 75 CAPSULE ORAL at 08:07

## 2025-07-27 RX ADMIN — AMLODIPINE BESYLATE 5 MG: 5 TABLET ORAL at 08:07

## 2025-07-27 RX ADMIN — OXYCODONE HYDROCHLORIDE AND ACETAMINOPHEN 1 TABLET: 10; 325 TABLET ORAL at 08:07

## 2025-07-27 RX ADMIN — THERA TABS 1 TABLET: TAB at 08:07

## 2025-07-27 RX ADMIN — LEVETIRACETAM 500 MG: 500 TABLET, FILM COATED ORAL at 09:07

## 2025-07-27 RX ADMIN — METRONIDAZOLE 500 MG: 5 INJECTION, SOLUTION INTRAVENOUS at 09:07

## 2025-07-27 RX ADMIN — MUPIROCIN 1 G: 20 OINTMENT TOPICAL at 10:07

## 2025-07-27 RX ADMIN — OXYCODONE HYDROCHLORIDE AND ACETAMINOPHEN 1 TABLET: 10; 325 TABLET ORAL at 01:07

## 2025-07-27 RX ADMIN — LISINOPRIL 10 MG: 5 TABLET ORAL at 08:07

## 2025-07-27 RX ADMIN — METRONIDAZOLE 500 MG: 5 INJECTION, SOLUTION INTRAVENOUS at 12:07

## 2025-07-27 RX ADMIN — MAGNESIUM SULFATE HEPTAHYDRATE 2 G: 40 INJECTION, SOLUTION INTRAVENOUS at 10:07

## 2025-07-27 RX ADMIN — METRONIDAZOLE 500 MG: 5 INJECTION, SOLUTION INTRAVENOUS at 02:07

## 2025-07-27 RX ADMIN — ENOXAPARIN SODIUM 40 MG: 40 INJECTION SUBCUTANEOUS at 04:07

## 2025-07-27 RX ADMIN — CEFTRIAXONE 1 G: 1 INJECTION, POWDER, FOR SOLUTION INTRAMUSCULAR; INTRAVENOUS at 11:07

## 2025-07-27 RX ADMIN — PREGABALIN 200 MG: 75 CAPSULE ORAL at 09:07

## 2025-07-27 RX ADMIN — DULOXETINE 30 MG: 30 CAPSULE, DELAYED RELEASE ORAL at 08:07

## 2025-07-27 NOTE — ASSESSMENT & PLAN NOTE
Anemia is likely due to chronic disease.  No active bleeding.  Most recent hemoglobin and hematocrit are listed below.  Recent Labs     07/25/25  2041 07/26/25  0547 07/27/25  0720   HGB 8.5* 7.5* 6.8*   HCT 28.0* 23.8* 22.0*     Plan  - Monitor serial CBC: Daily  - Transfuse PRBC if patient becomes hemodynamically unstable, symptomatic or H/H drops below 7/21.  - Patient has not received any PRBC transfusions to date-plan to transfuse one unit today  - Patient's anemia is currently worse

## 2025-07-27 NOTE — PROGRESS NOTES
[facility]  Progress Note    Patient Name: Froilan Ray  MRN: [id number]  Patient Class: IP- Inpatient   Admission Date: 7/25/2025  Length of Stay: 1 days  Attending Physician: Nirmal Coyne MD  Primary Care Provider: Alphonse Boothe III, MD        Subjective     Principal Problem:Pancolitis        HPI:  Ms. Ray is a 79 yr old female with a hx of chronic pain with opiod dependence, HTN, seizure disorder, PAF not on long term anticoag per home med list, CHF with preserved EF, left femur fracture s/p ORIF, neuropathy, diverticulosis, anemia, chronic gastric ulcer, osteoporosis, depression, staph arthritis of left hip, bacteremia, IBS who presented to the ED with a CC of fall and left hip pain. Patient is currently A&O x 3 to self, place, and month/year. She tells me that she slid out of her bed 3 days ago and caught herself before she actually fell. She denied hitting her head and doesn't think she hurt anything else, but since that time has been having left hip pain. She describes the pain as 7/10 intermittent throbbing pain worse with movement or laying on that side. She denies any radiation of her pain and states that she has been taking her home percocet with temporary relief of the pain. She also tells me that she has had multiple episodes of left hip pain similar to this in the past. She endorses diarrhea today as well. She denies tobacco and alcohol use. She denies fever, chills, SOB, CP, abdominal pain, nausea, vomiting, hematuria, dysuria, lightheadedness, dizziness, and syncope.     Upon arrival to ED, patient afebrile, HR of 96, RR of 16, BP of 148/83, satting 96% on RA. Workup in the ED revealed RBC of 3.53, H/H of 8.5/28, platelets of 452, sodium of 135, potassium of 3.4, glucose of 123, albumin of 3.2, CRP of 27.6. Remainder of labs fairly unremarkable. Left hip xray shows no evidence of acute osseous process or hardware complication involving the pelvis  or bilateral hips. CT left hip shows superior posterior migration of the head of the left proximal femoral prosthesis into the left ilium, a finding which is not significantly changed when compared to imaging from November 2024.  No evidence of periprosthetic fracture or hardware complication associated with the left proximal femoral prosthesis. CT abdomen/pelvis shows pancolitis. Patient was given morphine 2 mg IV x3 and ondansetron 4 mg IV while in the ED. Case discussed with ED provider and patient will be placed under observation for further management.    Overview/Hospital Course:  79-year-old female with PMH per HPI who presents after a fall with acute on chronic left hip pain and diarrhea with abdominal pains.  CT of left hip showed superior posterior migration of the head of the left proximal femoral prosthesis into the left ilium which is not significantly changed from prior imaging in 2024.  CT abdomen and pelvis showed pancolitis.  Given IV antibiotics.  Pending stool studies.  Consults to both Orthopedic surgery and Gastroenterology.  Patient declined colonoscopy.    Interval History: notified by bedside nurse that patient found eating Rubio's that family brought to her.  Since she is tolerating her Rubio's, we will advance her diet.  Hemoglobin dropped today to 6.8.  will transfuse 1 unit PRBCs.  Denies blood in stool. She did decline colonoscopy this admission.  Will continue to monitor.    Review of Systems   Constitutional:  Negative for chills and fever.   Respiratory:  Negative for shortness of breath.    Cardiovascular:  Negative for chest pain.   Gastrointestinal:  Positive for diarrhea. Negative for abdominal pain, nausea and vomiting.   Genitourinary:  Negative for dysuria and hematuria.   Musculoskeletal:  Positive for arthralgias (left hip).   Neurological:  Negative for dizziness, syncope and light-headedness.     Objective:     Vital Signs (Most Recent):  Temp: 98.2 °F (36.8 °C)  (07/27/25 1411)  Pulse: 98 (07/27/25 1411)  Resp: 16 (07/27/25 1411)  BP: 124/70 (07/27/25 1411)  SpO2: 96 % (07/27/25 1411) Vital Signs (24h Range):  Temp:  [97.6 °F (36.4 °C)-98.7 °F (37.1 °C)] 98.2 °F (36.8 °C)  Pulse:  [79-99] 98  Resp:  [16-18] 16  SpO2:  [95 %-99 %] 96 %  BP: (106-135)/(63-72) 124/70     Weight: 62.1 kg (136 lb 14.5 oz)  Body mass index is 22.1 kg/m².    Intake/Output Summary (Last 24 hours) at 7/27/2025 1425  Last data filed at 7/27/2025 1356  Gross per 24 hour   Intake 1927.83 ml   Output 400 ml   Net 1527.83 ml         Physical Exam  Vitals reviewed.   Constitutional:       General: She is awake. She is not in acute distress.     Appearance: Normal appearance.   Cardiovascular:      Rate and Rhythm: Normal rate and regular rhythm.   Pulmonary:      Effort: Pulmonary effort is normal. No accessory muscle usage or respiratory distress.   Abdominal:      General: Abdomen is flat.      Palpations: Abdomen is soft.      Tenderness: There is generalized abdominal tenderness (Minimal lower quadrants). There is no guarding.   Musculoskeletal:      Right lower leg: No edema.      Left lower leg: No edema.      Comments: Left hip with significant tenderness to palpation on exam and worse with ROM at hip joint. No overlying skin changes or deformities appreciated. ROM decreased 2/2 pain.   Skin:     General: Skin is warm and dry.   Neurological:      General: No focal deficit present.      Mental Status: She is alert and oriented to person, place, and time. Mental status is at baseline.   Psychiatric:         Behavior: Behavior is cooperative.               Significant Labs: All pertinent labs within the past 24 hours have been reviewed.  CBC:   Recent Labs   Lab 07/25/25 2041 07/26/25  0547 07/27/25  0720   WBC 9.25 7.75 4.87   HGB 8.5* 7.5* 6.8*   HCT 28.0* 23.8* 22.0*   * 367 345     CMP:   Recent Labs   Lab 07/25/25 2041 07/26/25  0547 07/27/25  0720   * 137 135*   K 3.4* 3.0* 3.7     103 105   CO2 25 23 27   * 110 104   BUN 9 8 5*   CREATININE 0.5 0.5 0.6   CALCIUM 9.4 8.8 8.1*   PROT 6.7 5.8* 5.4*   ALBUMIN 3.2* 2.9* 2.8*   BILITOT 0.4 0.9 0.3   ALKPHOS 104 95 77   AST 9* 9* 9*   ALT 6* 6* 5*   ANIONGAP 6* 11 3*       Significant Imaging: I have reviewed all pertinent imaging results/findings within the past 24 hours.      Assessment & Plan  Pancolitis  Symptoms improving  CTAP performed in the ED with pancolitis   - GI consulted, patient declined colonoscopy, recommended antibiotic therapy for 7 days and advance diet as tolerated  - tolerating clear liquids and Rubio's that family brought in.  Advance diet to bland.   - C Diff and GI PCR pending-both normal  - Empiric therapy with ceftriaxone and flagyl  - discontinue vancomycin  - PRN pain meds    Left hip pain  Acute on chronic after fall  CT left hip shows superior posterior migration of the head of the left proximal femoral prosthesis into the left ilium, a finding which is not significantly changed when compared to imaging from November 2024.  No evidence of periprosthetic fracture or hardware complication associated with the left proximal femoral prosthesis.    - Ortho consulted, appreciate recs    - PRN pain meds  - PT/OT recommending moderate intensity therapy.  Chronic pain with uncomplicated opioid dependence  Hx noted.  reviewed  - Continue home percocet, Lyrica, and duloxetine  - Naloxone PRN  Seizure disorder  Stable  - Continue home keppra  - Seizure precautions  Paroxysmal atrial fibrillation  Controlled not on rate or rhythm control meds.  Not on anticoagulants either.  -telemetry monitoring  Chronic heart failure with preserved ejection fraction  Patient has Diastolic (HFpEF) heart failure that is Chronic. On presentation their CHF was well compensated.     Echo 01/17/24    Left Ventricle: The left ventricle is normal in size. Normal wall thickness. There is concentric remodeling. Normal wall motion.  There is low normal systolic function with a visually estimated ejection fraction of 50 - 55%. There is normal diastolic function.    Right Ventricle: Normal right ventricular cavity size. Wall thickness is normal. Right ventricle wall motion  is normal. Systolic function is normal.    Aortic Valve: There is moderate aortic valve sclerosis. There is moderate annular calcification present.    Mitral Valve: There is severe mitral annular calcification present.    Aorta: Aortic root is normal in size measuring 3.37 cm. Ascending aorta is normal measuring 3.51 cm.    Pulmonary Artery: The estimated pulmonary artery systolic pressure is 25 mmHg.    IVC/SVC: Normal venous pressure at 3 mmHg.    Plan  - Monitor strict I&Os     - The patient's volume status is at their baseline  Hypertension  Chronic.  Mostly controlled.  Takes amlodipine-benazepril at home.  -continue amlodipine  -add lisinopril as formulary equivalent  Hypokalemia  Patient's most recent potassium results are listed below.   Recent Labs     07/25/25 2041 07/26/25  0547 07/27/25  0720   K 3.4* 3.0* 3.7     Plan  - Replete potassium with IV replacement per patient preference  - Monitor potassium Daily  - Patient's hypokalemia is worse  Hypophosphatemia  Patient's most recent phosphorus results are listed below.   Recent Labs     07/26/25  0547 07/27/25  0720   PHOS 2.3* 3.1     Plan  - Will treat hypophosphatemia with IV replacement per patient preference  - Patient's hypophosphatemia is stable  Anemia  Anemia is likely due to chronic disease.  No active bleeding.  Most recent hemoglobin and hematocrit are listed below.  Recent Labs     07/25/25 2041 07/26/25  0547 07/27/25  0720   HGB 8.5* 7.5* 6.8*   HCT 28.0* 23.8* 22.0*     Plan  - Monitor serial CBC: Daily  - Transfuse PRBC if patient becomes hemodynamically unstable, symptomatic or H/H drops below 7/21.  - Patient has not received any PRBC transfusions to date-plan to transfuse one unit today  -  Patient's anemia is currently worse    VTE Risk Mitigation (From admission, onward)           Ordered     enoxaparin injection 40 mg  Daily         07/25/25 2316     IP VTE HIGH RISK PATIENT  Once         07/25/25 2316                    Discharge Planning   BAILEY: 7/29/2025     Code Status: Full Code   Medical Readiness for Discharge Date:   Discharge Plan A: Home with family   Discharge Delays: None known at this time              Please place Justification for DME      CHRISTAL Robison  Department of [facility]   [facility]

## 2025-07-27 NOTE — PT/OT/SLP PROGRESS
Physical Therapy Treatment    Patient Name:  Froilan Ray   MRN:  [id number]    Recommendations:     Discharge Recommendations: Moderate Intensity Therapy  Discharge Equipment Recommendations: to be determined by next level of care  Barriers to discharge: None    Assessment:     Froilan Ray is a 79 y.o. female admitted with a medical diagnosis of Pancolitis.  She presents with the following impairments/functional limitations: weakness, impaired endurance, impaired self care skills, impaired functional mobility, gait instability, impaired balance, decreased lower extremity function, pain, decreased ROM, orthopedic precautions . Awake, alert, supine in bed.  Agreed to participate in therapy.  Reports pain  LH, due to prior Sx,  nurse aware.  Sup > sit with Min A ( extra time and assistance required to scoot forward to feet flat).  Sat briefly.  Sit > stand rw, Mod A.  4 steps to chair at bedside.  Sat ~ 10 min , tech arrived to get blood.  Performed step transfer BTB with rw, Mod A.  Sit > supine Max A , positioned for comfort.   Nurse requested patient return to bed for IV to be placed and to receive blood.     Rehab Prognosis: Fair; patient would benefit from acute skilled PT services to address these deficits and reach maximum level of function.    Recent Surgery: * No surgery found *      Plan:     During this hospitalization, patient to be seen daily to address the identified rehab impairments via gait training, therapeutic activities, therapeutic exercises and progress toward the following goals:    Plan of Care Expires:  08/23/25    Subjective     Chief Complaint: pain LH  Patient/Family Comments/goals: none stated  Pain/Comfort:  Pain Rating 1: 10/10  Location - Side 1: Left  Location - Orientation 1: generalized  Location 1: hip  Pain Addressed 1: Reposition, Nurse notified      Objective:     Communicated with nurse Tang prior to session.  Patient found supine with bed alarm, telemetry upon PT  entry to room.     General Precautions: Standard, contact, fall, seizure, hearing impaired  Orthopedic Precautions: LLE weight bearing as tolerated  Braces: N/A  Respiratory Status: Room air     Functional Mobility:  Bed Mobility:     Supine to Sit: minimum assistance  Sit to Supine: maximal assistance  Transfers:     Sit to Stand:  moderate assistance with rolling walker  Bed to Chair: moderate assistance with  rolling walker  using  Step Transfer      AM-PAC 6 CLICK MOBILITY          Treatment & Education:  RLE exercises:  SLR's, HS, Hip abd/add.  BLE : AP's.   Transferred EOB Min A.  STS> step transfer <>chair, rw, Mod A.     Patient left supine with all lines intact, call button in reach, bed alarm on, and nurse Clyde notified..    GOALS:   Multidisciplinary Problems       Physical Therapy Goals          Problem: Physical Therapy    Goal Priority Disciplines Outcome Interventions   Physical Therapy Goal     PT, PT/OT     Description: Goals to be met by: 25     Patient will increase functional independence with mobility by performin. Supine to sit with Supervision  2. Sit to stand transfer with Minimal Assistance  3. Bed to chair transfer with Minimal Assistance using Rolling Walker  4. Gait  x 50 feet with Minimal Assistance using Rolling Walker.                             DME Justifications:  No DME recommended requiring DME justifications    Time Tracking:     PT Received On: 25  PT Start Time: 0955     PT Stop Time: 1018  PT Total Time (min): 23 min     Billable Minutes: Therapeutic Activity 23min    Treatment Type: Treatment  PT/PTA: PTA     Number of PTA visits since last PT visit: 2025

## 2025-07-27 NOTE — ASSESSMENT & PLAN NOTE
Acute on chronic after fall  CT left hip shows superior posterior migration of the head of the left proximal femoral prosthesis into the left ilium, a finding which is not significantly changed when compared to imaging from November 2024.  No evidence of periprosthetic fracture or hardware complication associated with the left proximal femoral prosthesis.    - Ortho consulted, appreciate recs    - PRN pain meds  - PT/OT recommending moderate intensity therapy.

## 2025-07-27 NOTE — ASSESSMENT & PLAN NOTE
Symptoms improving  CTAP performed in the ED with pancolitis   - GI consulted, patient declined colonoscopy, recommended antibiotic therapy for 7 days and advance diet as tolerated  - tolerating clear liquids and Rubio's that family brought in.  Advance diet to bland.   - C Diff and GI PCR pending-both normal  - Empiric therapy with ceftriaxone and flagyl  - discontinue vancomycin  - PRN pain meds

## 2025-07-27 NOTE — ASSESSMENT & PLAN NOTE
Patient's most recent phosphorus results are listed below.   Recent Labs     07/26/25  0547 07/27/25  0720   PHOS 2.3* 3.1     Plan  - Will treat hypophosphatemia with IV replacement per patient preference  - Patient's hypophosphatemia is stable

## 2025-07-27 NOTE — ASSESSMENT & PLAN NOTE
Patient's most recent potassium results are listed below.   Recent Labs     07/25/25  2041 07/26/25  0547 07/27/25  0720   K 3.4* 3.0* 3.7     Plan  - Replete potassium with IV replacement per patient preference  - Monitor potassium Daily  - Patient's hypokalemia is worse

## 2025-07-27 NOTE — SUBJECTIVE & OBJECTIVE
Interval History: notified by bedside nurse that patient found eating Rubio's that family brought to her.  Since she is tolerating her Rubio's, we will advance her diet.  Hemoglobin dropped today to 6.8.  will transfuse 1 unit PRBCs.  Denies blood in stool. She did decline colonoscopy this admission.  Will continue to monitor.    Review of Systems   Constitutional:  Negative for chills and fever.   Respiratory:  Negative for shortness of breath.    Cardiovascular:  Negative for chest pain.   Gastrointestinal:  Positive for diarrhea. Negative for abdominal pain, nausea and vomiting.   Genitourinary:  Negative for dysuria and hematuria.   Musculoskeletal:  Positive for arthralgias (left hip).   Neurological:  Negative for dizziness, syncope and light-headedness.     Objective:     Vital Signs (Most Recent):  Temp: 98.2 °F (36.8 °C) (07/27/25 1411)  Pulse: 98 (07/27/25 1411)  Resp: 16 (07/27/25 1411)  BP: 124/70 (07/27/25 1411)  SpO2: 96 % (07/27/25 1411) Vital Signs (24h Range):  Temp:  [97.6 °F (36.4 °C)-98.7 °F (37.1 °C)] 98.2 °F (36.8 °C)  Pulse:  [79-99] 98  Resp:  [16-18] 16  SpO2:  [95 %-99 %] 96 %  BP: (106-135)/(63-72) 124/70     Weight: 62.1 kg (136 lb 14.5 oz)  Body mass index is 22.1 kg/m².    Intake/Output Summary (Last 24 hours) at 7/27/2025 1425  Last data filed at 7/27/2025 1356  Gross per 24 hour   Intake 1927.83 ml   Output 400 ml   Net 1527.83 ml         Physical Exam  Vitals reviewed.   Constitutional:       General: She is awake. She is not in acute distress.     Appearance: Normal appearance.   Cardiovascular:      Rate and Rhythm: Normal rate and regular rhythm.   Pulmonary:      Effort: Pulmonary effort is normal. No accessory muscle usage or respiratory distress.   Abdominal:      General: Abdomen is flat.      Palpations: Abdomen is soft.      Tenderness: There is generalized abdominal tenderness (Minimal lower quadrants). There is no guarding.   Musculoskeletal:      Right lower leg: No  edema.      Left lower leg: No edema.      Comments: Left hip with significant tenderness to palpation on exam and worse with ROM at hip joint. No overlying skin changes or deformities appreciated. ROM decreased 2/2 pain.   Skin:     General: Skin is warm and dry.   Neurological:      General: No focal deficit present.      Mental Status: She is alert and oriented to person, place, and time. Mental status is at baseline.   Psychiatric:         Behavior: Behavior is cooperative.               Significant Labs: All pertinent labs within the past 24 hours have been reviewed.  CBC:   Recent Labs   Lab 07/25/25 2041 07/26/25  0547 07/27/25  0720   WBC 9.25 7.75 4.87   HGB 8.5* 7.5* 6.8*   HCT 28.0* 23.8* 22.0*   * 367 345     CMP:   Recent Labs   Lab 07/25/25 2041 07/26/25  0547 07/27/25  0720   * 137 135*   K 3.4* 3.0* 3.7    103 105   CO2 25 23 27   * 110 104   BUN 9 8 5*   CREATININE 0.5 0.5 0.6   CALCIUM 9.4 8.8 8.1*   PROT 6.7 5.8* 5.4*   ALBUMIN 3.2* 2.9* 2.8*   BILITOT 0.4 0.9 0.3   ALKPHOS 104 95 77   AST 9* 9* 9*   ALT 6* 6* 5*   ANIONGAP 6* 11 3*       Significant Imaging: I have reviewed all pertinent imaging results/findings within the past 24 hours.

## 2025-07-27 NOTE — PLAN OF CARE
Problem: Physical Therapy  Goal: Physical Therapy Goal  Description: Goals to be met by: 25     Patient will increase functional independence with mobility by performin. Supine to sit with Supervision  2. Sit to stand transfer with Minimal Assistance  3. Bed to chair transfer with Minimal Assistance using Rolling Walker  4. Gait  x 50 feet with Minimal Assistance using Rolling Walker.        Outcome: Progressing   Ambulate with rw and assistance for safety.

## 2025-07-28 ENCOUNTER — TELEPHONE (OUTPATIENT)
Dept: FAMILY MEDICINE | Facility: CLINIC | Age: 80
End: 2025-07-28
Payer: MEDICARE

## 2025-07-28 VITALS
TEMPERATURE: 98 F | RESPIRATION RATE: 16 BRPM | OXYGEN SATURATION: 93 % | HEART RATE: 85 BPM | BODY MASS INDEX: 22 KG/M2 | SYSTOLIC BLOOD PRESSURE: 132 MMHG | HEIGHT: 66 IN | WEIGHT: 136.88 LBS | DIASTOLIC BLOOD PRESSURE: 65 MMHG

## 2025-07-28 LAB
ABSOLUTE EOSINOPHIL (SMH): 0.13 K/UL
ABSOLUTE MONOCYTE (SMH): 0.51 K/UL (ref 0.3–1)
ABSOLUTE NEUTROPHIL COUNT (SMH): 3.1 K/UL (ref 1.8–7.7)
ALBUMIN SERPL-MCNC: 2.6 G/DL (ref 3.5–5.2)
ALP SERPL-CCNC: 78 UNIT/L (ref 55–135)
ALT SERPL-CCNC: 4 UNIT/L (ref 10–44)
ANION GAP (SMH): 6 MMOL/L (ref 8–16)
AST SERPL-CCNC: 6 UNIT/L (ref 10–40)
BASOPHILS # BLD AUTO: 0.02 K/UL
BASOPHILS NFR BLD AUTO: 0.4 %
BILIRUB SERPL-MCNC: 0.3 MG/DL (ref 0.1–1)
BUN SERPL-MCNC: 8 MG/DL (ref 8–23)
CALCIUM SERPL-MCNC: 8.1 MG/DL (ref 8.7–10.5)
CHLORIDE SERPL-SCNC: 105 MMOL/L (ref 95–110)
CO2 SERPL-SCNC: 24 MMOL/L (ref 23–29)
CREAT SERPL-MCNC: 0.5 MG/DL (ref 0.5–1.4)
ERYTHROCYTE [DISTWIDTH] IN BLOOD BY AUTOMATED COUNT: 15.9 % (ref 11.5–14.5)
GFR SERPLBLD CREATININE-BSD FMLA CKD-EPI: >60 ML/MIN/1.73/M2
GLUCOSE SERPL-MCNC: 111 MG/DL (ref 70–110)
HCT VFR BLD AUTO: 25.3 % (ref 37–48.5)
HGB BLD-MCNC: 8 GM/DL (ref 12–16)
IMM GRANULOCYTES # BLD AUTO: 0.02 K/UL (ref 0–0.04)
IMM GRANULOCYTES NFR BLD AUTO: 0.4 % (ref 0–0.5)
LYMPHOCYTES # BLD AUTO: 0.77 K/UL (ref 1–4.8)
MAGNESIUM SERPL-MCNC: 1.9 MG/DL (ref 1.6–2.6)
MCH RBC QN AUTO: 24.8 PG (ref 27–31)
MCHC RBC AUTO-ENTMCNC: 31.6 G/DL (ref 32–36)
MCV RBC AUTO: 79 FL (ref 82–98)
NUCLEATED RBC (/100WBC) (SMH): 0 /100 WBC
PHOSPHATE SERPL-MCNC: 2.9 MG/DL (ref 2.7–4.5)
PLATELET # BLD AUTO: 363 K/UL (ref 150–450)
PMV BLD AUTO: 9.6 FL (ref 9.2–12.9)
POTASSIUM SERPL-SCNC: 3.6 MMOL/L (ref 3.5–5.1)
PROT SERPL-MCNC: 5.5 GM/DL (ref 6–8.4)
RBC # BLD AUTO: 3.22 M/UL (ref 4–5.4)
RELATIVE EOSINOPHIL (SMH): 2.9 % (ref 0–8)
RELATIVE LYMPHOCYTE (SMH): 16.9 % (ref 18–48)
RELATIVE MONOCYTE (SMH): 11.2 % (ref 4–15)
RELATIVE NEUTROPHIL (SMH): 68.2 % (ref 38–73)
SODIUM SERPL-SCNC: 135 MMOL/L (ref 136–145)
WBC # BLD AUTO: 4.56 K/UL (ref 3.9–12.7)

## 2025-07-28 PROCEDURE — 63600175 PHARM REV CODE 636 W HCPCS

## 2025-07-28 PROCEDURE — 25000003 PHARM REV CODE 250: Performed by: STUDENT IN AN ORGANIZED HEALTH CARE EDUCATION/TRAINING PROGRAM

## 2025-07-28 PROCEDURE — 36415 COLL VENOUS BLD VENIPUNCTURE: CPT

## 2025-07-28 PROCEDURE — 80053 COMPREHEN METABOLIC PANEL: CPT

## 2025-07-28 PROCEDURE — 85025 COMPLETE CBC W/AUTO DIFF WBC: CPT

## 2025-07-28 PROCEDURE — 83735 ASSAY OF MAGNESIUM: CPT

## 2025-07-28 PROCEDURE — 25000003 PHARM REV CODE 250

## 2025-07-28 PROCEDURE — 84100 ASSAY OF PHOSPHORUS: CPT

## 2025-07-28 PROCEDURE — 25000003 PHARM REV CODE 250: Performed by: NURSE PRACTITIONER

## 2025-07-28 RX ORDER — AMOXICILLIN AND CLAVULANATE POTASSIUM 875; 125 MG/1; MG/1
1 TABLET, FILM COATED ORAL EVERY 12 HOURS
Qty: 10 TABLET | Refills: 0 | Status: SHIPPED | OUTPATIENT
Start: 2025-07-28 | End: 2025-08-02

## 2025-07-28 RX ORDER — MUPIROCIN 20 MG/G
OINTMENT TOPICAL 2 TIMES DAILY
Qty: 22 G | Refills: 0 | Status: SHIPPED | OUTPATIENT
Start: 2025-07-28 | End: 2025-08-02

## 2025-07-28 RX ORDER — MUPIROCIN 20 MG/G
OINTMENT TOPICAL 2 TIMES DAILY
Qty: 22 G | Refills: 0 | Status: SHIPPED | OUTPATIENT
Start: 2025-07-28 | End: 2025-08-19

## 2025-07-28 RX ADMIN — LISINOPRIL 10 MG: 5 TABLET ORAL at 08:07

## 2025-07-28 RX ADMIN — METRONIDAZOLE 500 MG: 5 INJECTION, SOLUTION INTRAVENOUS at 04:07

## 2025-07-28 RX ADMIN — AMLODIPINE BESYLATE 5 MG: 5 TABLET ORAL at 08:07

## 2025-07-28 RX ADMIN — DULOXETINE 30 MG: 30 CAPSULE, DELAYED RELEASE ORAL at 08:07

## 2025-07-28 RX ADMIN — OXYCODONE HYDROCHLORIDE AND ACETAMINOPHEN 1 TABLET: 10; 325 TABLET ORAL at 10:07

## 2025-07-28 RX ADMIN — PREGABALIN 200 MG: 75 CAPSULE ORAL at 08:07

## 2025-07-28 RX ADMIN — LEVETIRACETAM 500 MG: 500 TABLET, FILM COATED ORAL at 08:07

## 2025-07-28 RX ADMIN — OXYCODONE HYDROCHLORIDE AND ACETAMINOPHEN 1 TABLET: 10; 325 TABLET ORAL at 06:07

## 2025-07-28 RX ADMIN — MUPIROCIN 1 G: 20 OINTMENT TOPICAL at 08:07

## 2025-07-28 RX ADMIN — THERA TABS 1 TABLET: TAB at 08:07

## 2025-07-28 NOTE — DISCHARGE SUMMARY
[facility] Medicine  Discharge Summary      Patient Name: Froilan Ray  MRN: [id number]  [facility]: [id number]  Patient Class: IP- Inpatient  Admission Date: 7/25/2025  Hospital Length of Stay: 2 days  Discharge Date and Time: 7/28/2025  1:06 PM  Attending Physician: Reba att. providers found   Discharging Provider: CHRISTAL Robison  Primary Care Provider: Alphonse Boothe III, MD    Primary Care Team: Networked reference to record PCT     HPI:   Ms. Ray is a 79 yr old female with a hx of chronic pain with opiod dependence, HTN, seizure disorder, PAF not on long term anticoag per home med list, CHF with preserved EF, left femur fracture s/p ORIF, neuropathy, diverticulosis, anemia, chronic gastric ulcer, osteoporosis, depression, staph arthritis of left hip, bacteremia, IBS who presented to the ED with a CC of fall and left hip pain. Patient is currently A&O x 3 to self, place, and month/year. She tells me that she slid out of her bed 3 days ago and caught herself before she actually fell. She denied hitting her head and doesn't think she hurt anything else, but since that time has been having left hip pain. She describes the pain as 7/10 intermittent throbbing pain worse with movement or laying on that side. She denies any radiation of her pain and states that she has been taking her home percocet with temporary relief of the pain. She also tells me that she has had multiple episodes of left hip pain similar to this in the past. She endorses diarrhea today as well. She denies tobacco and alcohol use. She denies fever, chills, SOB, CP, abdominal pain, nausea, vomiting, hematuria, dysuria, lightheadedness, dizziness, and syncope.     Upon arrival to ED, patient afebrile, HR of 96, RR of 16, BP of 148/83, satting 96% on RA. Workup in the ED revealed RBC of 3.53, H/H of 8.5/28, platelets of 452, sodium of 135, potassium of 3.4, glucose of 123, albumin of 3.2, CRP of 27.6. Remainder  of labs fairly unremarkable. Left hip xray shows no evidence of acute osseous process or hardware complication involving the pelvis or bilateral hips. CT left hip shows superior posterior migration of the head of the left proximal femoral prosthesis into the left ilium, a finding which is not significantly changed when compared to imaging from November 2024.  No evidence of periprosthetic fracture or hardware complication associated with the left proximal femoral prosthesis. CT abdomen/pelvis shows pancolitis. Patient was given morphine 2 mg IV x3 and ondansetron 4 mg IV while in the ED. Case discussed with ED provider and patient will be placed under observation for further management.    * No surgery found *      Hospital Course:   79-year-old female with PMH per HPI who presents after a fall with acute on chronic left hip pain and diarrhea with abdominal pains.  CT of left hip showed superior posterior migration of the head of the left proximal femoral prosthesis into the left ilium which is not significantly changed from prior imaging in 2024.  CT abdomen and pelvis showed pancolitis.  Given IV antibiotics.  Stool studies negative.  Consults to both Orthopedic surgery and Gastroenterology.  Patient declined colonoscopy.  Recommended abx treatment for pancolitis x 7 days and was treated with IV ceftriaxone and IV metronidazole.  Orthopedic surgery noted a slight acetabular fracture. Recommended weightbearing as tolerated and orthopedic follow up in 2 weeks.  PT/OT were consulted and recommended moderate intensity therapy.  Patient declined placement.  Hemoglobin trended down to 6.8 with no obvious source of bleeding.  She was transfused one unit PRBCs with good response.  Diet was advanced to cardiac and was well tolerated.  She was seen and examined on date of discharge and was appropriate.  Discharge instructions were reviewed and return precautions discussed with good understanding. Home health was arranged.       Goals of Care Treatment Preferences:  Code Status: Full Code          What is most important right now is to focus on symptom/pain control.  Accordingly, we have decided that the best plan to meet the patient's goals includes continuing with treatment.         Consults:   Consults (From admission, onward)          Status Ordering Provider     Inpatient consult to Orthopedics  Once        Provider:  Konstantin Mae MD    Completed RICCARDO BROWN     Inpatient consult to Gastroenterology  Once        Provider:  David Vasquez MD    Completed RICCARDO BROWN     Case Management/  Once        Provider:  (Not yet assigned)    Completed RICCARDO BROWN            Assessment & Plan  Pancolitis  Symptoms improving  CTAP performed in the ED with pancolitis   - GI consulted, patient declined colonoscopy, recommended antibiotic therapy for 7 days and advance diet as tolerated  - tolerating clear liquids and Rubio's that family brought in.  Advance diet to bland.   - C Diff and GI PCR pending-both normal  - Empiric therapy with ceftriaxone and flagyl  - discontinue vancomycin  - PRN pain meds    Left hip pain  Acute on chronic after fall  CT left hip shows superior posterior migration of the head of the left proximal femoral prosthesis into the left ilium, a finding which is not significantly changed when compared to imaging from November 2024.  No evidence of periprosthetic fracture or hardware complication associated with the left proximal femoral prosthesis.    - Ortho consulted, appreciate recs    - PRN pain meds  - PT/OT recommending moderate intensity therapy.  Chronic pain with uncomplicated opioid dependence  Hx noted.  reviewed  - Continue home percocet, Lyrica, and duloxetine  - Naloxone PRN  Seizure disorder  Stable  - Continue home keppra  - Seizure precautions  Paroxysmal atrial fibrillation  Controlled not on rate or rhythm control meds.  Not on anticoagulants  either.  -telemetry monitoring  Chronic heart failure with preserved ejection fraction  Patient has Diastolic (HFpEF) heart failure that is Chronic. On presentation their CHF was well compensated.     Echo 01/17/24    Left Ventricle: The left ventricle is normal in size. Normal wall thickness. There is concentric remodeling. Normal wall motion. There is low normal systolic function with a visually estimated ejection fraction of 50 - 55%. There is normal diastolic function.    Right Ventricle: Normal right ventricular cavity size. Wall thickness is normal. Right ventricle wall motion  is normal. Systolic function is normal.    Aortic Valve: There is moderate aortic valve sclerosis. There is moderate annular calcification present.    Mitral Valve: There is severe mitral annular calcification present.    Aorta: Aortic root is normal in size measuring 3.37 cm. Ascending aorta is normal measuring 3.51 cm.    Pulmonary Artery: The estimated pulmonary artery systolic pressure is 25 mmHg.    IVC/SVC: Normal venous pressure at 3 mmHg.    Plan  - Monitor strict I&Os     - The patient's volume status is at their baseline  Hypertension  Chronic.  Mostly controlled.  Takes amlodipine-benazepril at home.  -continue amlodipine  -add lisinopril as formulary equivalent  Hypokalemia  Patient's most recent potassium results are listed below.   Recent Labs     07/26/25  0547 07/27/25  0720 07/28/25  0635   K 3.0* 3.7 3.6     Plan  - Replete potassium with IV replacement per patient preference  - Monitor potassium Daily  - Patient's hypokalemia is worse  Hypophosphatemia  Patient's most recent phosphorus results are listed below.   Recent Labs     07/26/25  0547 07/27/25  0720 07/28/25  0635   PHOS 2.3* 3.1 2.9     Plan  - Will treat hypophosphatemia with IV replacement per patient preference  - Patient's hypophosphatemia is stable  Anemia  Anemia is likely due to chronic disease.  No active bleeding.  Most recent hemoglobin and  hematocrit are listed below.  Recent Labs     07/26/25  0547 07/27/25  0720 07/28/25  0635   HGB 7.5* 6.8* 8.0*   HCT 23.8* 22.0* 25.3*     Plan  - Monitor serial CBC: Daily  - Transfuse PRBC if patient becomes hemodynamically unstable, symptomatic or H/H drops below 7/21.  - Patient has not received any PRBC transfusions to date-plan to transfuse one unit today  - Patient's anemia is currently worse    Final Active Diagnoses:    Diagnosis Date Noted POA    PRINCIPAL PROBLEM:  Pancolitis [K51.00] 07/25/2025 Yes    Left hip pain [M25.552] 07/26/2025 Yes    Anemia [D64.9] 04/12/2025 Yes    Hypertension [I10] 08/06/2024 Yes    Hypophosphatemia [E83.39] 02/17/2024 Yes    Chronic heart failure with preserved ejection fraction [I50.32] 01/16/2024 Yes    Hypokalemia [E87.6] 06/19/2022 Yes    Paroxysmal atrial fibrillation [I48.0] 06/16/2022 Yes    Seizure disorder [G40.909] 12/11/2018 Yes    Chronic pain with uncomplicated opioid dependence [F11.20] 08/27/2013 Yes     Chronic      Problems Resolved During this Admission:       Discharged Condition: stable    Disposition: Home or Self Care    Follow Up:   Follow-up Information       Alphonse Boothe III, MD Follow up.    Specialty: Family Medicine  Contact information:  [address]  [address]  [place] [address]  [phone]               Konstantin Mae MD Follow up in 2 week(s).    Specialty: Orthopedic Surgery  Contact information:  [address]  [place] [address]  [phone]               [facility] Follow up.    Specialties: Home Health Services, Home Therapy Services, Home Living Aide Services  Contact information:  [address] [address]  [phone]                         Patient Instructions:      Ambulatory referral/consult to Orthopedics   Standing Status: Future   Referral Priority: Routine Referral Type: Consultation   Requested Specialty: Orthopedic Surgery   Number of Visits Requested: 1      Ambulatory referral/consult to Home Health   Standing Status: Future   Referral Priority: Routine Referral Type: Home Health   Referral Reason: Specialty Services Required   Requested Specialty: Home Health Services   Number of Visits Requested: 1     Diet Cardiac     Notify your health care provider if you experience any of the following:  temperature >100.4     Notify your health care provider if you experience any of the following:  persistent nausea and vomiting or diarrhea     Notify your health care provider if you experience any of the following:  severe uncontrolled pain     Notify your health care provider if you experience any of the following:  difficulty breathing or increased cough     Notify your health care provider if you experience any of the following:  severe persistent headache     Notify your health care provider if you experience any of the following:  persistent dizziness, light-headedness, or visual disturbances     Notify your health care provider if you experience any of the following:  increased confusion or weakness     Activity as tolerated       Significant Diagnostic Studies: Labs: All labs within the past 24 hours have been reviewed    Pending Diagnostic Studies:       None           Medications:  Reconciled Home Medications:      Medication List        START taking these medications      amoxicillin-clavulanate 875-125mg 875-125 mg per tablet  Commonly known as: AUGMENTIN  Take 1 tablet by mouth every 12 (twelve) hours. for 5 days     * mupirocin 2 % ointment  Commonly known as: BACTROBAN  by Nasal route 2 (two) times daily. for 12 days     * mupirocin 2 % ointment  Commonly known as: BACTROBAN  by Nasal route 2 (two) times daily. for 5 days           * This list has 2 medication(s) that are the same as other medications prescribed for you. Read the directions carefully, and ask your doctor or other care provider to review them with you.                CONTINUE taking these  medications      acetaminophen 325 MG tablet  Commonly known as: TYLENOL  Take 2 tablets (650 mg total) by mouth every 8 (eight) hours as needed for Pain (pain scale 1-4).     amlodipine-benazepril 5-20 mg 5-20 mg per capsule  Commonly known as: LOTREL  Take 1 capsule by mouth once daily.     cyclobenzaprine 10 MG tablet  Commonly known as: FLEXERIL  Take 1 tablet (10 mg total) by mouth 3 (three) times daily as needed for Muscle spasms.     docusate sodium 100 MG capsule  Commonly known as: COLACE  Take 1 capsule (100 mg total) by mouth 2 (two) times daily.     DULoxetine 30 MG capsule  Commonly known as: CYMBALTA  Take 30 mg by mouth once daily.     famotidine 20 MG tablet  Commonly known as: PEPCID  Take 20 mg by mouth daily as needed for Heartburn.     levETIRAcetam 500 MG Tab  Commonly known as: KEPPRA  Take 1 tablet (500 mg total) by mouth 2 (two) times daily.     multivitamin with minerals tablet  Take 1 tablet by mouth once daily.     oxyCODONE-acetaminophen  mg per tablet  Commonly known as: PERCOCET  Take 1 tablet by mouth every 4 (four) hours as needed for Pain.     pregabalin 200 MG Cap  Commonly known as: LYRICA  Take 1 capsule (200 mg total) by mouth 2 (two) times daily.            ASK your doctor about these medications      ondansetron 4 MG Tbdl  Commonly known as: ZOFRAN-ODT  Take 1 tablet (4 mg total) by mouth every 8 (eight) hours as needed (nausea, vomiting).              Indwelling Lines/Drains at time of discharge:   Lines/Drains/Airways       None                       Time spent on the discharge of patient: 35 minutes         CHRISTAL Robison  Department of Hospital Medicine  [facility]

## 2025-07-28 NOTE — PLAN OF CARE
DC orders/medications reviewed.  PCP follow up requested via inBitvore message.  Ortho referral placed.  AVS printed/reviewed prior to CM clearing patient/completing follow up.  [facility] SOC 7/29.       07/28/25 1533   Final Note   Assessment Type Final Discharge Note   Anticipated Discharge Disposition Home-Health   What phone number can be called within the next 1-3 days to see how you are doing after discharge? [phone]   Hospital Resources/Appts/Education Provided Post-Acute resouces added to AVS   Post-Acute Status   Post-Acute Authorization Home Health   Home Health Status Set-up Complete/Auth obtained   Discharge Delays None known at this time

## 2025-07-28 NOTE — PT/OT/SLP PROGRESS
Physical Therapy      Patient Name:  Froilan Ray   MRN:  [id number]    Patient not seen today secondary to first attempt patient declines participation due to pain. RN administered pain medication, but then informed PT that patient is leaving to go home and declines therapy services. Will follow-up 07/29/25 if D/C falls through.

## 2025-07-28 NOTE — PT/OT/SLP PROGRESS
Occupational Therapy      Patient Name:  Froilan Ray   MRN:  [id number]    Patient not seen today secondary to (discharged).      7/28/2025

## 2025-07-28 NOTE — ASSESSMENT & PLAN NOTE
Patient's most recent potassium results are listed below.   Recent Labs     07/26/25  0547 07/27/25  0720 07/28/25  0635   K 3.0* 3.7 3.6     Plan  - Replete potassium with IV replacement per patient preference  - Monitor potassium Daily  - Patient's hypokalemia is worse

## 2025-07-28 NOTE — ASSESSMENT & PLAN NOTE
Anemia is likely due to chronic disease.  No active bleeding.  Most recent hemoglobin and hematocrit are listed below.  Recent Labs     07/26/25  0547 07/27/25  0720 07/28/25  0635   HGB 7.5* 6.8* 8.0*   HCT 23.8* 22.0* 25.3*     Plan  - Monitor serial CBC: Daily  - Transfuse PRBC if patient becomes hemodynamically unstable, symptomatic or H/H drops below 7/21.  - Patient has not received any PRBC transfusions to date-plan to transfuse one unit today  - Patient's anemia is currently worse

## 2025-07-28 NOTE — ASSESSMENT & PLAN NOTE
Patient's most recent phosphorus results are listed below.   Recent Labs     07/26/25  0547 07/27/25  0720 07/28/25  0635   PHOS 2.3* 3.1 2.9     Plan  - Will treat hypophosphatemia with IV replacement per patient preference  - Patient's hypophosphatemia is stable

## 2025-07-28 NOTE — TELEPHONE ENCOUNTER
----- Message from  Mirna sent at 7/28/2025 11:08 AM CDT -----  Regarding: Hospital discharge follow up  Date: 07/28/2025      Patient: Froilan Ray  YOB: 1945    RE: [facility] Hospital Admission     Admit Diagnosis: pancolitis      Froilan Ray is being discharged from the hospital today.     [ ] The patient does not currently have a primary care provider.     [ ] The patient has a diagnosis of congestive heart failure.     [x ] Other: She is in need of a hospital follow up with her PCP      Transportation Needs:  @[facility]@        A follow-up appointment has been recommended within 3 days of discharge to support post-discharge management and reduce risk of admission.       Sincerely,   Mirna Dowd

## 2025-07-29 PROCEDURE — G0180 MD CERTIFICATION HHA PATIENT: HCPCS | Mod: ,,, | Performed by: FAMILY MEDICINE

## 2025-08-04 ENCOUNTER — OUTPATIENT CASE MANAGEMENT (OUTPATIENT)
Dept: ADMINISTRATIVE | Facility: OTHER | Age: 80
End: 2025-08-04
Payer: MEDICARE

## 2025-08-05 ENCOUNTER — TELEPHONE (OUTPATIENT)
Dept: FAMILY MEDICINE | Facility: CLINIC | Age: 80
End: 2025-08-05
Payer: MEDICARE

## 2025-08-05 DIAGNOSIS — I50.32 CHRONIC HEART FAILURE WITH PRESERVED EJECTION FRACTION: Primary | ICD-10-CM

## 2025-08-05 NOTE — PROGRESS NOTES
Spoke to Nurse Mcdonald with [facility] who reports hearing ronchi in bilateral lungs.  Patient refuses to sit up due to pain so she is only able to auscultate her lateral lungs.  She is also refusing to get up onto scale.  Nurse states patient has no cough or shortness of breath.  Patient has a history of chronic heart failure so BNP, CBC, CMP were ordered.   She was also recently discharged from hospital stay and missed her hospital follow up with me and her orthopedic doctor due to complaints of pain.  Recommended patient go to ER if she develops fluid, shortness of breath or cough.  Will make further recommendations once her labs are received.

## 2025-08-05 NOTE — TELEPHONE ENCOUNTER
Spoke with Tamara, states she had expiratory rhonchi in all lobes on assessment.     She is refusing to stand on the scale due to severe hip pain. She was suppose to f/u with ortho but was in too much pain to go to the appointment, rescheduled til Thursday. Patient denies cough and chest pain. No swelling in the ankles. States this is only the second time she's seen the patient and the first time she refused to sit up so she was only able to listen to the lateral aspect of her lungs.    She missed her HFU with Karina Birmingham yesterday and the son is suppose to be calling when he's able to get her rescheduled but she wanted to make the office aware. Last seen by Karina Birmingham 4/22/25 for HFU as well.

## 2025-08-05 NOTE — TELEPHONE ENCOUNTER
Copied from CRM #[id number]. Topic: General Inquiry - Patient Advice  >> Aug 5, 2025 10:14 AM Yomaira wrote:  Type: Needs Medical Advice       Who Called: [facility] ROBERT Mcdonald   Best Call Back Number: [phone]  Additional Information: Requesting a call back regarding ROBERT is hearing  rockfield in the lungs. Pt not having issues breathing at this time. Pt is having hip pain and unable to get pt weight. Everything else looked good, vitals.   Please Advise- Thank you

## 2025-08-06 ENCOUNTER — LAB REQUISITION (OUTPATIENT)
Dept: LAB | Facility: HOSPITAL | Age: 80
End: 2025-08-06
Payer: MEDICARE

## 2025-08-06 DIAGNOSIS — I50.32 CHRONIC DIASTOLIC (CONGESTIVE) HEART FAILURE: ICD-10-CM

## 2025-08-06 LAB
ABSOLUTE EOSINOPHIL (SMH): 0.13 K/UL
ABSOLUTE MONOCYTE (SMH): 0.49 K/UL (ref 0.3–1)
ABSOLUTE NEUTROPHIL COUNT (SMH): 6.8 K/UL (ref 1.8–7.7)
ALBUMIN SERPL-MCNC: 2.6 G/DL (ref 3.5–5.2)
ALP SERPL-CCNC: 107 UNIT/L (ref 40–150)
ALT SERPL-CCNC: <8 UNIT/L (ref 10–44)
ANION GAP (SMH): 13 MMOL/L (ref 8–16)
AST SERPL-CCNC: 19 UNIT/L (ref 11–45)
BASOPHILS # BLD AUTO: 0.03 K/UL
BASOPHILS NFR BLD AUTO: 0.4 %
BILIRUB SERPL-MCNC: 0.2 MG/DL (ref 0.1–1)
BUN SERPL-MCNC: 9 MG/DL (ref 8–23)
CALCIUM SERPL-MCNC: 9.1 MG/DL (ref 8.7–10.5)
CHLORIDE SERPL-SCNC: 102 MMOL/L (ref 95–110)
CO2 SERPL-SCNC: 24 MMOL/L (ref 23–29)
CREAT SERPL-MCNC: 0.7 MG/DL (ref 0.5–1.4)
ERYTHROCYTE [DISTWIDTH] IN BLOOD BY AUTOMATED COUNT: 17.1 % (ref 11.5–14.5)
GFR SERPLBLD CREATININE-BSD FMLA CKD-EPI: >60 ML/MIN/1.73/M2
GLUCOSE SERPL-MCNC: 115 MG/DL (ref 70–110)
HCT VFR BLD AUTO: 32 % (ref 37–48.5)
HGB BLD-MCNC: 9.6 GM/DL (ref 12–16)
IMM GRANULOCYTES # BLD AUTO: 0.04 K/UL (ref 0–0.04)
IMM GRANULOCYTES NFR BLD AUTO: 0.5 % (ref 0–0.5)
LYMPHOCYTES # BLD AUTO: 1.08 K/UL (ref 1–4.8)
MCH RBC QN AUTO: 24.6 PG (ref 27–31)
MCHC RBC AUTO-ENTMCNC: 30 G/DL (ref 32–36)
MCV RBC AUTO: 82 FL (ref 82–98)
NT-PROBNP SERPL-MCNC: 465 PG/ML
NUCLEATED RBC (/100WBC) (SMH): 0 /100 WBC
PLATELET # BLD AUTO: 882 K/UL (ref 150–450)
PMV BLD AUTO: 9.1 FL (ref 9.2–12.9)
POTASSIUM SERPL-SCNC: 3.5 MMOL/L (ref 3.5–5.1)
PROT SERPL-MCNC: 7.3 GM/DL (ref 6–8.4)
RBC # BLD AUTO: 3.9 M/UL (ref 4–5.4)
RELATIVE EOSINOPHIL (SMH): 1.5 % (ref 0–8)
RELATIVE LYMPHOCYTE (SMH): 12.6 % (ref 18–48)
RELATIVE MONOCYTE (SMH): 5.7 % (ref 4–15)
RELATIVE NEUTROPHIL (SMH): 79.3 % (ref 38–73)
SODIUM SERPL-SCNC: 139 MMOL/L (ref 136–145)
WBC # BLD AUTO: 8.55 K/UL (ref 3.9–12.7)

## 2025-08-06 PROCEDURE — 80053 COMPREHEN METABOLIC PANEL: CPT | Performed by: FAMILY MEDICINE

## 2025-08-06 PROCEDURE — 83880 ASSAY OF NATRIURETIC PEPTIDE: CPT | Performed by: FAMILY MEDICINE

## 2025-08-06 PROCEDURE — 85025 COMPLETE CBC W/AUTO DIFF WBC: CPT | Performed by: FAMILY MEDICINE

## 2025-08-11 ENCOUNTER — OUTPATIENT CASE MANAGEMENT (OUTPATIENT)
Dept: ADMINISTRATIVE | Facility: OTHER | Age: 80
End: 2025-08-11
Payer: MEDICARE

## 2025-08-13 ENCOUNTER — EXTERNAL HOME HEALTH (OUTPATIENT)
Dept: HOME HEALTH SERVICES | Facility: HOSPITAL | Age: 80
End: 2025-08-13
Payer: MEDICARE

## 2025-08-13 ENCOUNTER — DOCUMENT SCAN (OUTPATIENT)
Dept: HOME HEALTH SERVICES | Facility: HOSPITAL | Age: 80
End: 2025-08-13
Payer: MEDICARE

## 2025-08-18 ENCOUNTER — OUTPATIENT CASE MANAGEMENT (OUTPATIENT)
Dept: ADMINISTRATIVE | Facility: OTHER | Age: 80
End: 2025-08-18
Payer: MEDICARE

## 2025-08-21 ENCOUNTER — TELEPHONE (OUTPATIENT)
Dept: HOME HEALTH SERVICES | Facility: CLINIC | Age: 80
End: 2025-08-21
Payer: MEDICARE

## 2025-08-21 ENCOUNTER — TELEPHONE (OUTPATIENT)
Dept: FAMILY MEDICINE | Facility: CLINIC | Age: 80
End: 2025-08-21
Payer: MEDICARE

## 2025-08-21 DIAGNOSIS — I11.0 HYPERTENSIVE HEART DISEASE WITH DIASTOLIC CONGESTIVE HEART FAILURE, UNSPECIFIED HF CHRONICITY: ICD-10-CM

## 2025-08-21 DIAGNOSIS — K51.00 CHRONIC ULCERATIVE ENTEROCOLITIS WITHOUT COMPLICATION: Primary | ICD-10-CM

## 2025-08-21 DIAGNOSIS — I50.30 HYPERTENSIVE HEART DISEASE WITH DIASTOLIC CONGESTIVE HEART FAILURE, UNSPECIFIED HF CHRONICITY: ICD-10-CM

## 2025-08-22 ENCOUNTER — TELEPHONE (OUTPATIENT)
Dept: HOME HEALTH SERVICES | Facility: CLINIC | Age: 80
End: 2025-08-22
Payer: MEDICARE

## 2025-08-22 ENCOUNTER — TELEPHONE (OUTPATIENT)
Dept: FAMILY MEDICINE | Facility: CLINIC | Age: 80
End: 2025-08-22
Payer: MEDICARE

## 2025-08-25 ENCOUNTER — TELEPHONE (OUTPATIENT)
Dept: HOME HEALTH SERVICES | Facility: CLINIC | Age: 80
End: 2025-08-25
Payer: MEDICARE

## 2025-08-26 ENCOUNTER — CARE AT HOME (OUTPATIENT)
Dept: HOME HEALTH SERVICES | Facility: CLINIC | Age: 80
End: 2025-08-26
Payer: MEDICARE

## 2025-08-26 ENCOUNTER — HOSPITAL ENCOUNTER (INPATIENT)
Facility: HOSPITAL | Age: 80
LOS: 1 days | Discharge: HOME HEALTH | DRG: 092 | End: 2025-08-28
Attending: EMERGENCY MEDICINE | Admitting: FAMILY MEDICINE
Payer: MEDICARE

## 2025-08-26 VITALS
RESPIRATION RATE: 20 BRPM | HEART RATE: 89 BPM | HEIGHT: 60 IN | BODY MASS INDEX: 26.84 KG/M2 | TEMPERATURE: 98 F | WEIGHT: 136.69 LBS | OXYGEN SATURATION: 96 %

## 2025-08-26 DIAGNOSIS — M25.552 LEFT HIP PAIN: Primary | ICD-10-CM

## 2025-08-26 DIAGNOSIS — R07.9 CHEST PAIN: ICD-10-CM

## 2025-08-26 DIAGNOSIS — R41.82 ALTERED MENTAL STATUS: Primary | ICD-10-CM

## 2025-08-26 DIAGNOSIS — M79.10 MYALGIA: ICD-10-CM

## 2025-08-26 PROCEDURE — 99349 HOME/RES VST EST MOD MDM 40: CPT | Mod: S$GLB,,, | Performed by: NURSE PRACTITIONER

## 2025-08-26 PROCEDURE — 99285 EMERGENCY DEPT VISIT HI MDM: CPT

## 2025-08-27 PROBLEM — R10.9 ABDOMINAL PAIN: Status: ACTIVE | Noted: 2025-08-27

## 2025-08-27 PROBLEM — E44.0 MODERATE MALNUTRITION: Status: ACTIVE | Noted: 2025-08-27

## 2025-08-27 PROBLEM — M79.10 MYALGIA: Status: ACTIVE | Noted: 2025-08-27

## 2025-08-27 LAB
ABSOLUTE EOSINOPHIL (SMH): 0.02 K/UL
ABSOLUTE EOSINOPHIL (SMH): 0.03 K/UL
ABSOLUTE MONOCYTE (SMH): 0.42 K/UL (ref 0.3–1)
ABSOLUTE MONOCYTE (SMH): 0.5 K/UL (ref 0.3–1)
ABSOLUTE NEUTROPHIL COUNT (SMH): 6.5 K/UL (ref 1.8–7.7)
ABSOLUTE NEUTROPHIL COUNT (SMH): 7.6 K/UL (ref 1.8–7.7)
ALBUMIN SERPL-MCNC: 3.1 G/DL (ref 3.5–5.2)
ALBUMIN SERPL-MCNC: 3.2 G/DL (ref 3.5–5.2)
ALP SERPL-CCNC: 80 UNIT/L (ref 55–135)
ALP SERPL-CCNC: 89 UNIT/L (ref 55–135)
ALT SERPL-CCNC: 3 UNIT/L (ref 10–44)
ALT SERPL-CCNC: <3 UNIT/L (ref 10–44)
ANION GAP (SMH): 11 MMOL/L (ref 8–16)
ANION GAP (SMH): 11 MMOL/L (ref 8–16)
AST SERPL-CCNC: 8 UNIT/L (ref 10–40)
AST SERPL-CCNC: 8 UNIT/L (ref 10–40)
BASOPHILS # BLD AUTO: 0.05 K/UL
BASOPHILS # BLD AUTO: 0.05 K/UL
BASOPHILS NFR BLD AUTO: 0.6 %
BASOPHILS NFR BLD AUTO: 0.6 %
BILIRUB SERPL-MCNC: 0.3 MG/DL (ref 0.1–1)
BILIRUB SERPL-MCNC: 0.4 MG/DL (ref 0.1–1)
BILIRUB UR QL STRIP.AUTO: NEGATIVE
BUN SERPL-MCNC: 10 MG/DL (ref 8–23)
BUN SERPL-MCNC: 10 MG/DL (ref 8–23)
CALCIUM SERPL-MCNC: 9.1 MG/DL (ref 8.7–10.5)
CALCIUM SERPL-MCNC: 9.2 MG/DL (ref 8.7–10.5)
CHLORIDE SERPL-SCNC: 101 MMOL/L (ref 95–110)
CHLORIDE SERPL-SCNC: 103 MMOL/L (ref 95–110)
CLARITY UR: ABNORMAL
CO2 SERPL-SCNC: 22 MMOL/L (ref 23–29)
CO2 SERPL-SCNC: 24 MMOL/L (ref 23–29)
COLOR UR AUTO: YELLOW
CREAT SERPL-MCNC: 0.6 MG/DL (ref 0.5–1.4)
CREAT SERPL-MCNC: 0.6 MG/DL (ref 0.5–1.4)
ERYTHROCYTE [DISTWIDTH] IN BLOOD BY AUTOMATED COUNT: 18.2 % (ref 11.5–14.5)
ERYTHROCYTE [DISTWIDTH] IN BLOOD BY AUTOMATED COUNT: 18.4 % (ref 11.5–14.5)
FLUAV AG UPPER RESP QL IA.RAPID: NEGATIVE
FLUBV AG UPPER RESP QL IA.RAPID: NEGATIVE
GFR SERPLBLD CREATININE-BSD FMLA CKD-EPI: >60 ML/MIN/1.73/M2
GFR SERPLBLD CREATININE-BSD FMLA CKD-EPI: >60 ML/MIN/1.73/M2
GLUCOSE SERPL-MCNC: 107 MG/DL (ref 70–110)
GLUCOSE SERPL-MCNC: 95 MG/DL (ref 70–110)
GLUCOSE UR QL STRIP: NEGATIVE
HCT VFR BLD AUTO: 30 % (ref 37–48.5)
HCT VFR BLD AUTO: 32.1 % (ref 37–48.5)
HGB BLD-MCNC: 8.9 GM/DL (ref 12–16)
HGB BLD-MCNC: 9.7 GM/DL (ref 12–16)
HGB UR QL STRIP: NEGATIVE
IMM GRANULOCYTES # BLD AUTO: 0.03 K/UL (ref 0–0.04)
IMM GRANULOCYTES # BLD AUTO: 0.03 K/UL (ref 0–0.04)
IMM GRANULOCYTES NFR BLD AUTO: 0.3 % (ref 0–0.5)
IMM GRANULOCYTES NFR BLD AUTO: 0.4 % (ref 0–0.5)
KETONES UR QL STRIP: NEGATIVE
LACTATE SERPL-SCNC: 1.1 MMOL/L (ref 0.5–1.9)
LDH SERPL L TO P-CCNC: 0.95 MMOL/L (ref 0.5–2.2)
LEUKOCYTE ESTERASE UR QL STRIP: NEGATIVE
LYMPHOCYTES # BLD AUTO: 0.95 K/UL (ref 1–4.8)
LYMPHOCYTES # BLD AUTO: 1.24 K/UL (ref 1–4.8)
MAGNESIUM SERPL-MCNC: 2 MG/DL (ref 1.6–2.6)
MCH RBC QN AUTO: 23.9 PG (ref 27–31)
MCH RBC QN AUTO: 24.1 PG (ref 27–31)
MCHC RBC AUTO-ENTMCNC: 29.7 G/DL (ref 32–36)
MCHC RBC AUTO-ENTMCNC: 30.2 G/DL (ref 32–36)
MCV RBC AUTO: 80 FL (ref 82–98)
MCV RBC AUTO: 80 FL (ref 82–98)
NITRITE UR QL STRIP: NEGATIVE
NUCLEATED RBC (/100WBC) (SMH): 0 /100 WBC
NUCLEATED RBC (/100WBC) (SMH): 0 /100 WBC
OHS QRS DURATION: 74 MS
OHS QTC CALCULATION: 439 MS
PH UR STRIP: 7 [PH]
PLATELET # BLD AUTO: 515 K/UL (ref 150–450)
PLATELET # BLD AUTO: 804 K/UL (ref 150–450)
PLATELET BLD QL SMEAR: NORMAL
PMV BLD AUTO: 8.8 FL (ref 9.2–12.9)
PMV BLD AUTO: 9.7 FL (ref 9.2–12.9)
POTASSIUM SERPL-SCNC: 4.3 MMOL/L (ref 3.5–5.1)
POTASSIUM SERPL-SCNC: 4.3 MMOL/L (ref 3.5–5.1)
PROCALCITONIN SERPL-MCNC: 0.2 NG/ML
PROT SERPL-MCNC: 7.1 GM/DL (ref 6–8.4)
PROT SERPL-MCNC: 7.8 GM/DL (ref 6–8.4)
PROT UR QL STRIP: NEGATIVE
RBC # BLD AUTO: 3.73 M/UL (ref 4–5.4)
RBC # BLD AUTO: 4.02 M/UL (ref 4–5.4)
RELATIVE EOSINOPHIL (SMH): 0.2 % (ref 0–8)
RELATIVE EOSINOPHIL (SMH): 0.4 % (ref 0–8)
RELATIVE LYMPHOCYTE (SMH): 10.5 % (ref 18–48)
RELATIVE LYMPHOCYTE (SMH): 14.8 % (ref 18–48)
RELATIVE MONOCYTE (SMH): 4.6 % (ref 4–15)
RELATIVE MONOCYTE (SMH): 6 % (ref 4–15)
RELATIVE NEUTROPHIL (SMH): 77.8 % (ref 38–73)
RELATIVE NEUTROPHIL (SMH): 83.8 % (ref 38–73)
SAMPLE: NORMAL
SARS-COV-2 RDRP RESP QL NAA+PROBE: NEGATIVE
SODIUM SERPL-SCNC: 136 MMOL/L (ref 136–145)
SODIUM SERPL-SCNC: 136 MMOL/L (ref 136–145)
SP GR UR STRIP: 1.01
UROBILINOGEN UR STRIP-ACNC: NEGATIVE EU/DL
WBC # BLD AUTO: 8.38 K/UL (ref 3.9–12.7)
WBC # BLD AUTO: 9.06 K/UL (ref 3.9–12.7)

## 2025-08-27 PROCEDURE — 80053 COMPREHEN METABOLIC PANEL: CPT | Mod: 59 | Performed by: EMERGENCY MEDICINE

## 2025-08-27 PROCEDURE — 11000001 HC ACUTE MED/SURG PRIVATE ROOM

## 2025-08-27 PROCEDURE — 83735 ASSAY OF MAGNESIUM: CPT | Performed by: HOSPITALIST

## 2025-08-27 PROCEDURE — 25000003 PHARM REV CODE 250: Performed by: HOSPITALIST

## 2025-08-27 PROCEDURE — 85025 COMPLETE CBC W/AUTO DIFF WBC: CPT | Performed by: EMERGENCY MEDICINE

## 2025-08-27 PROCEDURE — 87502 INFLUENZA DNA AMP PROBE: CPT | Performed by: EMERGENCY MEDICINE

## 2025-08-27 PROCEDURE — U0002 COVID-19 LAB TEST NON-CDC: HCPCS | Performed by: EMERGENCY MEDICINE

## 2025-08-27 PROCEDURE — 27000207 HC ISOLATION

## 2025-08-27 PROCEDURE — 25500020 PHARM REV CODE 255: Performed by: EMERGENCY MEDICINE

## 2025-08-27 PROCEDURE — 36415 COLL VENOUS BLD VENIPUNCTURE: CPT | Performed by: HOSPITALIST

## 2025-08-27 PROCEDURE — 80053 COMPREHEN METABOLIC PANEL: CPT | Mod: 91 | Performed by: HOSPITALIST

## 2025-08-27 PROCEDURE — 96372 THER/PROPH/DIAG INJ SC/IM: CPT | Performed by: HOSPITALIST

## 2025-08-27 PROCEDURE — 63600175 PHARM REV CODE 636 W HCPCS: Performed by: HOSPITALIST

## 2025-08-27 PROCEDURE — 81003 URINALYSIS AUTO W/O SCOPE: CPT | Performed by: EMERGENCY MEDICINE

## 2025-08-27 PROCEDURE — 84145 PROCALCITONIN (PCT): CPT | Performed by: EMERGENCY MEDICINE

## 2025-08-27 PROCEDURE — 63600175 PHARM REV CODE 636 W HCPCS: Performed by: FAMILY MEDICINE

## 2025-08-27 PROCEDURE — 25000003 PHARM REV CODE 250: Performed by: FAMILY MEDICINE

## 2025-08-27 PROCEDURE — 93010 ELECTROCARDIOGRAM REPORT: CPT | Mod: ,,, | Performed by: INTERNAL MEDICINE

## 2025-08-27 PROCEDURE — 93005 ELECTROCARDIOGRAM TRACING: CPT | Performed by: INTERNAL MEDICINE

## 2025-08-27 PROCEDURE — 85025 COMPLETE CBC W/AUTO DIFF WBC: CPT | Mod: 91 | Performed by: HOSPITALIST

## 2025-08-27 PROCEDURE — 83605 ASSAY OF LACTIC ACID: CPT | Performed by: EMERGENCY MEDICINE

## 2025-08-27 RX ORDER — SODIUM,POTASSIUM PHOSPHATES 280-250MG
2 POWDER IN PACKET (EA) ORAL
Status: DISCONTINUED | OUTPATIENT
Start: 2025-08-27 | End: 2025-08-28 | Stop reason: HOSPADM

## 2025-08-27 RX ORDER — ALUMINUM HYDROXIDE, MAGNESIUM HYDROXIDE, AND SIMETHICONE 1200; 120; 1200 MG/30ML; MG/30ML; MG/30ML
30 SUSPENSION ORAL 4 TIMES DAILY PRN
Status: DISCONTINUED | OUTPATIENT
Start: 2025-08-27 | End: 2025-08-28 | Stop reason: HOSPADM

## 2025-08-27 RX ORDER — BENAZEPRIL HYDROCHLORIDE 20 MG/1
20 TABLET ORAL DAILY
Status: DISCONTINUED | OUTPATIENT
Start: 2025-08-27 | End: 2025-08-28 | Stop reason: HOSPADM

## 2025-08-27 RX ORDER — HALOPERIDOL LACTATE 5 MG/ML
5 INJECTION, SOLUTION INTRAMUSCULAR ONCE
Status: COMPLETED | OUTPATIENT
Start: 2025-08-27 | End: 2025-08-27

## 2025-08-27 RX ORDER — ACETAMINOPHEN 325 MG/1
650 TABLET ORAL EVERY 8 HOURS PRN
Status: DISCONTINUED | OUTPATIENT
Start: 2025-08-27 | End: 2025-08-28 | Stop reason: HOSPADM

## 2025-08-27 RX ORDER — LANOLIN ALCOHOL/MO/W.PET/CERES
800 CREAM (GRAM) TOPICAL
Status: DISCONTINUED | OUTPATIENT
Start: 2025-08-27 | End: 2025-08-28 | Stop reason: HOSPADM

## 2025-08-27 RX ORDER — ACETAMINOPHEN 325 MG/1
650 TABLET ORAL EVERY 4 HOURS PRN
Status: DISCONTINUED | OUTPATIENT
Start: 2025-08-27 | End: 2025-08-27

## 2025-08-27 RX ORDER — NALOXONE HCL 0.4 MG/ML
0.02 VIAL (ML) INJECTION
Status: DISCONTINUED | OUTPATIENT
Start: 2025-08-27 | End: 2025-08-28 | Stop reason: HOSPADM

## 2025-08-27 RX ORDER — MUPIROCIN 20 MG/G
OINTMENT TOPICAL 2 TIMES DAILY
Status: DISCONTINUED | OUTPATIENT
Start: 2025-08-27 | End: 2025-08-28 | Stop reason: HOSPADM

## 2025-08-27 RX ORDER — FAMOTIDINE 20 MG/1
20 TABLET, FILM COATED ORAL 2 TIMES DAILY
Status: DISCONTINUED | OUTPATIENT
Start: 2025-08-27 | End: 2025-08-28 | Stop reason: HOSPADM

## 2025-08-27 RX ORDER — ONDANSETRON HYDROCHLORIDE 2 MG/ML
4 INJECTION, SOLUTION INTRAVENOUS EVERY 6 HOURS PRN
Status: DISCONTINUED | OUTPATIENT
Start: 2025-08-27 | End: 2025-08-28 | Stop reason: HOSPADM

## 2025-08-27 RX ORDER — FAMOTIDINE 20 MG/1
20 TABLET, FILM COATED ORAL DAILY
Status: DISCONTINUED | OUTPATIENT
Start: 2025-08-27 | End: 2025-08-27

## 2025-08-27 RX ORDER — IBUPROFEN 200 MG
24 TABLET ORAL
Status: DISCONTINUED | OUTPATIENT
Start: 2025-08-27 | End: 2025-08-28 | Stop reason: HOSPADM

## 2025-08-27 RX ORDER — AMLODIPINE BESYLATE 5 MG/1
5 TABLET ORAL DAILY
Status: DISCONTINUED | OUTPATIENT
Start: 2025-08-27 | End: 2025-08-28 | Stop reason: HOSPADM

## 2025-08-27 RX ORDER — HEPARIN SODIUM 5000 [USP'U]/ML
5000 INJECTION, SOLUTION INTRAVENOUS; SUBCUTANEOUS EVERY 8 HOURS
Status: DISCONTINUED | OUTPATIENT
Start: 2025-08-27 | End: 2025-08-28 | Stop reason: HOSPADM

## 2025-08-27 RX ORDER — SODIUM CHLORIDE 0.9 % (FLUSH) 0.9 %
10 SYRINGE (ML) INJECTION EVERY 12 HOURS PRN
Status: DISCONTINUED | OUTPATIENT
Start: 2025-08-27 | End: 2025-08-28 | Stop reason: HOSPADM

## 2025-08-27 RX ORDER — HYDROCODONE BITARTRATE AND ACETAMINOPHEN 5; 325 MG/1; MG/1
1 TABLET ORAL EVERY 6 HOURS PRN
Refills: 0 | Status: DISCONTINUED | OUTPATIENT
Start: 2025-08-27 | End: 2025-08-28 | Stop reason: HOSPADM

## 2025-08-27 RX ORDER — CEFTRIAXONE 2 G/1
2 INJECTION, POWDER, FOR SOLUTION INTRAMUSCULAR; INTRAVENOUS
Status: DISCONTINUED | OUTPATIENT
Start: 2025-08-27 | End: 2025-08-28 | Stop reason: HOSPADM

## 2025-08-27 RX ORDER — METRONIDAZOLE 500 MG/100ML
500 INJECTION, SOLUTION INTRAVENOUS
Status: DISCONTINUED | OUTPATIENT
Start: 2025-08-27 | End: 2025-08-28 | Stop reason: HOSPADM

## 2025-08-27 RX ORDER — MORPHINE SULFATE 2 MG/ML
2 INJECTION, SOLUTION INTRAMUSCULAR; INTRAVENOUS EVERY 4 HOURS PRN
Status: DISCONTINUED | OUTPATIENT
Start: 2025-08-27 | End: 2025-08-28 | Stop reason: HOSPADM

## 2025-08-27 RX ORDER — LEVETIRACETAM 500 MG/1
500 TABLET ORAL 2 TIMES DAILY
Status: DISCONTINUED | OUTPATIENT
Start: 2025-08-27 | End: 2025-08-28 | Stop reason: HOSPADM

## 2025-08-27 RX ORDER — AMOXICILLIN 250 MG
1 CAPSULE ORAL DAILY PRN
Status: DISCONTINUED | OUTPATIENT
Start: 2025-08-27 | End: 2025-08-28 | Stop reason: HOSPADM

## 2025-08-27 RX ORDER — TALC
6 POWDER (GRAM) TOPICAL NIGHTLY PRN
Status: DISCONTINUED | OUTPATIENT
Start: 2025-08-27 | End: 2025-08-28 | Stop reason: HOSPADM

## 2025-08-27 RX ORDER — IBUPROFEN 200 MG
16 TABLET ORAL
Status: DISCONTINUED | OUTPATIENT
Start: 2025-08-27 | End: 2025-08-28 | Stop reason: HOSPADM

## 2025-08-27 RX ORDER — AMLODIPINE AND BENAZEPRIL HYDROCHLORIDE 5; 20 MG/1; MG/1
1 CAPSULE ORAL DAILY
Status: DISCONTINUED | OUTPATIENT
Start: 2025-08-27 | End: 2025-08-27

## 2025-08-27 RX ORDER — GLUCAGON 1 MG
1 KIT INJECTION
Status: DISCONTINUED | OUTPATIENT
Start: 2025-08-27 | End: 2025-08-28 | Stop reason: HOSPADM

## 2025-08-27 RX ORDER — SODIUM CHLORIDE 9 MG/ML
INJECTION, SOLUTION INTRAVENOUS CONTINUOUS
Status: DISCONTINUED | OUTPATIENT
Start: 2025-08-27 | End: 2025-08-28

## 2025-08-27 RX ORDER — DULOXETIN HYDROCHLORIDE 30 MG/1
30 CAPSULE, DELAYED RELEASE ORAL DAILY
Status: DISCONTINUED | OUTPATIENT
Start: 2025-08-27 | End: 2025-08-28 | Stop reason: HOSPADM

## 2025-08-27 RX ADMIN — HYDROCODONE BITARTRATE AND ACETAMINOPHEN 1 TABLET: 5; 325 TABLET ORAL at 08:08

## 2025-08-27 RX ADMIN — Medication 6 MG: at 08:08

## 2025-08-27 RX ADMIN — IOHEXOL 100 ML: 350 INJECTION, SOLUTION INTRAVENOUS at 12:08

## 2025-08-27 RX ADMIN — MORPHINE SULFATE 2 MG: 2 INJECTION, SOLUTION INTRAMUSCULAR; INTRAVENOUS at 11:08

## 2025-08-27 RX ADMIN — METRONIDAZOLE 500 MG: 5 INJECTION, SOLUTION INTRAVENOUS at 03:08

## 2025-08-27 RX ADMIN — SODIUM CHLORIDE: 9 INJECTION, SOLUTION INTRAVENOUS at 03:08

## 2025-08-27 RX ADMIN — MORPHINE SULFATE 2 MG: 2 INJECTION, SOLUTION INTRAMUSCULAR; INTRAVENOUS at 06:08

## 2025-08-27 RX ADMIN — HALOPERIDOL LACTATE 5 MG: 5 INJECTION, SOLUTION INTRAMUSCULAR at 01:08

## 2025-08-27 RX ADMIN — METRONIDAZOLE 500 MG: 5 INJECTION, SOLUTION INTRAVENOUS at 08:08

## 2025-08-27 RX ADMIN — PREGABALIN 200 MG: 75 CAPSULE ORAL at 09:08

## 2025-08-27 RX ADMIN — HYDROCODONE BITARTRATE AND ACETAMINOPHEN 1 TABLET: 5; 325 TABLET ORAL at 03:08

## 2025-08-27 RX ADMIN — HEPARIN SODIUM 5000 UNITS: 5000 INJECTION INTRAVENOUS; SUBCUTANEOUS at 08:08

## 2025-08-27 RX ADMIN — BENAZEPRIL HYDROCHLORIDE 20 MG: 20 TABLET, COATED ORAL at 09:08

## 2025-08-27 RX ADMIN — DULOXETINE 30 MG: 30 CAPSULE, DELAYED RELEASE ORAL at 09:08

## 2025-08-27 RX ADMIN — FAMOTIDINE 20 MG: 20 TABLET, FILM COATED ORAL at 09:08

## 2025-08-27 RX ADMIN — CEFTRIAXONE 2 G: 2 INJECTION, POWDER, FOR SOLUTION INTRAMUSCULAR; INTRAVENOUS at 03:08

## 2025-08-27 RX ADMIN — HALOPERIDOL LACTATE 5 MG: 5 INJECTION, SOLUTION INTRAMUSCULAR at 11:08

## 2025-08-27 RX ADMIN — AMLODIPINE BESYLATE 5 MG: 5 TABLET ORAL at 09:08

## 2025-08-27 RX ADMIN — SODIUM CHLORIDE: 9 INJECTION, SOLUTION INTRAVENOUS at 01:08

## 2025-08-27 RX ADMIN — HEPARIN SODIUM 5000 UNITS: 5000 INJECTION INTRAVENOUS; SUBCUTANEOUS at 06:08

## 2025-08-27 RX ADMIN — MUPIROCIN OINTMENT 1 G: 20 OINTMENT TOPICAL at 11:08

## 2025-08-27 RX ADMIN — LEVETIRACETAM 500 MG: 500 TABLET, FILM COATED ORAL at 08:08

## 2025-08-27 RX ADMIN — LEVETIRACETAM 500 MG: 500 TABLET, FILM COATED ORAL at 09:08

## 2025-08-27 RX ADMIN — METRONIDAZOLE 500 MG: 5 INJECTION, SOLUTION INTRAVENOUS at 01:08

## 2025-08-27 RX ADMIN — FAMOTIDINE 20 MG: 20 TABLET, FILM COATED ORAL at 08:08

## 2025-08-27 RX ADMIN — PREGABALIN 200 MG: 75 CAPSULE ORAL at 08:08

## 2025-08-28 VITALS
DIASTOLIC BLOOD PRESSURE: 70 MMHG | TEMPERATURE: 98 F | OXYGEN SATURATION: 97 % | HEART RATE: 85 BPM | BODY MASS INDEX: 20.62 KG/M2 | WEIGHT: 128.31 LBS | RESPIRATION RATE: 17 BRPM | SYSTOLIC BLOOD PRESSURE: 133 MMHG | HEIGHT: 66 IN

## 2025-08-28 PROBLEM — R10.9 ABDOMINAL PAIN: Status: RESOLVED | Noted: 2025-08-27 | Resolved: 2025-08-28

## 2025-08-28 PROBLEM — G93.41 ENCEPHALOPATHY, METABOLIC: Status: RESOLVED | Noted: 2022-06-15 | Resolved: 2025-08-28

## 2025-08-28 LAB
ABSOLUTE EOSINOPHIL (SMH): 0.08 K/UL
ABSOLUTE MONOCYTE (SMH): 0.56 K/UL (ref 0.3–1)
ABSOLUTE NEUTROPHIL COUNT (SMH): 4.7 K/UL (ref 1.8–7.7)
ALBUMIN SERPL-MCNC: 2.8 G/DL (ref 3.5–5.2)
ALP SERPL-CCNC: 71 UNIT/L (ref 55–135)
ALT SERPL-CCNC: 4 UNIT/L (ref 10–44)
ANION GAP (SMH): 6 MMOL/L (ref 8–16)
AST SERPL-CCNC: 7 UNIT/L (ref 10–40)
BASOPHILS # BLD AUTO: 0.04 K/UL
BASOPHILS NFR BLD AUTO: 0.6 %
BILIRUB SERPL-MCNC: 0.2 MG/DL (ref 0.1–1)
BILIRUB UR QL STRIP.AUTO: NEGATIVE
BUN SERPL-MCNC: 11 MG/DL (ref 8–23)
CALCIUM SERPL-MCNC: 8.4 MG/DL (ref 8.7–10.5)
CHLORIDE SERPL-SCNC: 106 MMOL/L (ref 95–110)
CLARITY UR: CLEAR
CO2 SERPL-SCNC: 23 MMOL/L (ref 23–29)
COLOR UR AUTO: YELLOW
CREAT SERPL-MCNC: 0.6 MG/DL (ref 0.5–1.4)
ERYTHROCYTE [DISTWIDTH] IN BLOOD BY AUTOMATED COUNT: 18.2 % (ref 11.5–14.5)
GFR SERPLBLD CREATININE-BSD FMLA CKD-EPI: >60 ML/MIN/1.73/M2
GLUCOSE SERPL-MCNC: 95 MG/DL (ref 70–110)
GLUCOSE UR QL STRIP: NEGATIVE
HCT VFR BLD AUTO: 23.3 % (ref 37–48.5)
HGB BLD-MCNC: 7.2 GM/DL (ref 12–16)
HGB UR QL STRIP: NEGATIVE
IMM GRANULOCYTES # BLD AUTO: 0.03 K/UL (ref 0–0.04)
IMM GRANULOCYTES NFR BLD AUTO: 0.4 % (ref 0–0.5)
KETONES UR QL STRIP: NEGATIVE
LEUKOCYTE ESTERASE UR QL STRIP: NEGATIVE
LYMPHOCYTES # BLD AUTO: 1.32 K/UL (ref 1–4.8)
MAGNESIUM SERPL-MCNC: 1.8 MG/DL (ref 1.6–2.6)
MCH RBC QN AUTO: 24.4 PG (ref 27–31)
MCHC RBC AUTO-ENTMCNC: 30.9 G/DL (ref 32–36)
MCV RBC AUTO: 79 FL (ref 82–98)
NITRITE UR QL STRIP: NEGATIVE
NUCLEATED RBC (/100WBC) (SMH): 0 /100 WBC
PH UR STRIP: 7 [PH]
PLATELET # BLD AUTO: 603 K/UL (ref 150–450)
PMV BLD AUTO: 9 FL (ref 9.2–12.9)
POTASSIUM SERPL-SCNC: 4 MMOL/L (ref 3.5–5.1)
PROT SERPL-MCNC: 6.2 GM/DL (ref 6–8.4)
PROT UR QL STRIP: NEGATIVE
RBC # BLD AUTO: 2.95 M/UL (ref 4–5.4)
RELATIVE EOSINOPHIL (SMH): 1.2 % (ref 0–8)
RELATIVE LYMPHOCYTE (SMH): 19.7 % (ref 18–48)
RELATIVE MONOCYTE (SMH): 8.4 % (ref 4–15)
RELATIVE NEUTROPHIL (SMH): 69.7 % (ref 38–73)
SODIUM SERPL-SCNC: 135 MMOL/L (ref 136–145)
SP GR UR STRIP: >=1.03
UROBILINOGEN UR STRIP-ACNC: NEGATIVE EU/DL
WBC # BLD AUTO: 6.69 K/UL (ref 3.9–12.7)

## 2025-08-28 PROCEDURE — 51798 US URINE CAPACITY MEASURE: CPT

## 2025-08-28 PROCEDURE — 36415 COLL VENOUS BLD VENIPUNCTURE: CPT | Performed by: HOSPITALIST

## 2025-08-28 PROCEDURE — 80053 COMPREHEN METABOLIC PANEL: CPT | Performed by: HOSPITALIST

## 2025-08-28 PROCEDURE — 51701 INSERT BLADDER CATHETER: CPT

## 2025-08-28 PROCEDURE — 85025 COMPLETE CBC W/AUTO DIFF WBC: CPT | Performed by: HOSPITALIST

## 2025-08-28 PROCEDURE — 25000003 PHARM REV CODE 250: Performed by: HOSPITALIST

## 2025-08-28 PROCEDURE — 63600175 PHARM REV CODE 636 W HCPCS: Performed by: HOSPITALIST

## 2025-08-28 PROCEDURE — 83735 ASSAY OF MAGNESIUM: CPT | Performed by: HOSPITALIST

## 2025-08-28 PROCEDURE — 25000003 PHARM REV CODE 250: Performed by: FAMILY MEDICINE

## 2025-08-28 PROCEDURE — 81003 URINALYSIS AUTO W/O SCOPE: CPT | Performed by: FAMILY MEDICINE

## 2025-08-28 RX ORDER — MELOXICAM 7.5 MG/1
7.5 TABLET ORAL DAILY
Qty: 5 TABLET | Refills: 0 | Status: SHIPPED | OUTPATIENT
Start: 2025-08-28 | End: 2025-09-02

## 2025-08-28 RX ORDER — OXYCODONE AND ACETAMINOPHEN 7.5; 325 MG/1; MG/1
1 TABLET ORAL ONCE
Refills: 0 | Status: CANCELLED | OUTPATIENT
Start: 2025-08-28

## 2025-08-28 RX ORDER — ACETAMINOPHEN 325 MG/1
650 TABLET ORAL EVERY 8 HOURS PRN
Start: 2025-08-28

## 2025-08-28 RX ADMIN — FAMOTIDINE 20 MG: 20 TABLET, FILM COATED ORAL at 08:08

## 2025-08-28 RX ADMIN — PREGABALIN 200 MG: 75 CAPSULE ORAL at 08:08

## 2025-08-28 RX ADMIN — CEFTRIAXONE 2 G: 2 INJECTION, POWDER, FOR SOLUTION INTRAMUSCULAR; INTRAVENOUS at 05:08

## 2025-08-28 RX ADMIN — HEPARIN SODIUM 5000 UNITS: 5000 INJECTION INTRAVENOUS; SUBCUTANEOUS at 05:08

## 2025-08-28 RX ADMIN — LEVETIRACETAM 500 MG: 500 TABLET, FILM COATED ORAL at 08:08

## 2025-08-28 RX ADMIN — METRONIDAZOLE 500 MG: 5 INJECTION, SOLUTION INTRAVENOUS at 05:08

## 2025-08-28 RX ADMIN — AMLODIPINE BESYLATE 5 MG: 5 TABLET ORAL at 08:08

## 2025-08-28 RX ADMIN — SODIUM CHLORIDE: 9 INJECTION, SOLUTION INTRAVENOUS at 01:08

## 2025-08-28 RX ADMIN — BENAZEPRIL HYDROCHLORIDE 20 MG: 20 TABLET, COATED ORAL at 08:08

## 2025-08-28 RX ADMIN — HYDROCODONE BITARTRATE AND ACETAMINOPHEN 1 TABLET: 5; 325 TABLET ORAL at 05:08

## 2025-08-28 RX ADMIN — DULOXETINE 30 MG: 30 CAPSULE, DELAYED RELEASE ORAL at 08:08

## 2025-08-28 RX ADMIN — HYDROCODONE BITARTRATE AND ACETAMINOPHEN 1 TABLET: 5; 325 TABLET ORAL at 08:08

## 2025-08-28 RX ADMIN — MUPIROCIN OINTMENT 1 G: 20 OINTMENT TOPICAL at 08:08

## 2025-08-29 ENCOUNTER — TELEPHONE (OUTPATIENT)
Dept: FAMILY MEDICINE | Facility: CLINIC | Age: 80
End: 2025-08-29
Payer: MEDICARE

## 2025-08-29 ENCOUNTER — PATIENT OUTREACH (OUTPATIENT)
Dept: FAMILY MEDICINE | Facility: CLINIC | Age: 80
End: 2025-08-29
Payer: MEDICARE

## (undated) DEVICE — SUT VICRYL+ 1 CT1 18IN

## (undated) DEVICE — GOWN AERO CHROME W/ TOWEL XL

## (undated) DEVICE — BLADE RECP OFFSET 77.5X11X1.23

## (undated) DEVICE — DRAPE STERI INSTRUMENT 1018

## (undated) DEVICE — SUT MCRYL PLUS 4-0 PS2 27IN

## (undated) DEVICE — HOOD T7 W/ PEEL AWAY LENS

## (undated) DEVICE — UNDERGLOVE BIOGEL PI MICRO BLUE SZ 8

## (undated) DEVICE — SUTURE MONOCRYL 4-0 PS-2 27 MCP426H

## (undated) DEVICE — CORD MPLR STR PLUG DISP 10FT

## (undated) DEVICE — CANISTER INFOV.A.C WOUND 500ML

## (undated) DEVICE — KIT PROCEDURE STER INLET CLOSU

## (undated) DEVICE — DRAPE WARMER ORS-100

## (undated) DEVICE — APPLICATOR CHLORAPREP ORN 26ML

## (undated) DEVICE — PULSAVAC ZIMMER

## (undated) DEVICE — BRUSH INTRAMEDULLARY

## (undated) DEVICE — STAPLER SKIN REGULAR

## (undated) DEVICE — KIT PREVENA PLUS

## (undated) DEVICE — SUT ETHIBOND XTRA2 OS-4 30

## (undated) DEVICE — SUT VICRYL+ 27 UR-6 VIOL

## (undated) DEVICE — SUTURE SILK 2-0 30 A305H

## (undated) DEVICE — TAPE SURG DURAPORE 2 X10YD

## (undated) DEVICE — CANNULA ENDOPATH XCEL 5X100MM

## (undated) DEVICE — SPONGE COTTON TRAY 4X4IN

## (undated) DEVICE — SUT VICRYL PLUS 0 CT1 18IN

## (undated) DEVICE — KIT IRR SUCTION HND PIECE

## (undated) DEVICE — TRAY GENERAL LAPAROSCOPY SMH

## (undated) DEVICE — SUTURE PDS PLUS 0 L60IN ABSORBABLE VIOLET CTX L48MM 1/2 CIRC

## (undated) DEVICE — Device

## (undated) DEVICE — BLADE SAGITTAL 18 X 1.27 X 90M

## (undated) DEVICE — DRESSING AQUACEL RIBBON 2X45CM

## (undated) DEVICE — PENCIL VALLEYLAB TELSCP SMK

## (undated) DEVICE — MARKER SKIN STND TIP BLUE BARR

## (undated) DEVICE — NDL TUOHY EPIDURAL 20G X 3.5

## (undated) DEVICE — SUT VICRYL PLUS 2-0 CT1 18

## (undated) DEVICE — ELECTRODE REM PLYHSV RETURN 9

## (undated) DEVICE — KIT TOTAL HIP HPOFH OMC

## (undated) DEVICE — GLOVE BIOGEL SKINSENSE PI 7.5

## (undated) DEVICE — STERISTRIP 1/2 R1547

## (undated) DEVICE — BAG TISS RETRV MONARCH 10MM

## (undated) DEVICE — TUBE SUCTION YANKAUER

## (undated) DEVICE — SOLUTION IRRI NS BOTTLE 1000ML R5200-01

## (undated) DEVICE — SUT MONOCRYL 3-0 PS-2 UND

## (undated) DEVICE — COVER PROXIMA MAYO STAND

## (undated) DEVICE — RETRIEVER SUTURE HEWSON DISP

## (undated) DEVICE — SOL IRRI STRL WATER 1000ML

## (undated) DEVICE — SUT STRATAFIX 3-0 30CM

## (undated) DEVICE — SCISSOR CURVED ENDOPATH 5MM

## (undated) DEVICE — ELECTRODE L HOOK 13IN 33M

## (undated) DEVICE — SYR DISP LL 5CC

## (undated) DEVICE — SOL IRR NACL .9% 3000ML

## (undated) DEVICE — DISSECTOR KITTNER 5MM T 45CM S

## (undated) DEVICE — GLOVE BIOGEL MICRO SURGEON PINK SZ 7.5

## (undated) DEVICE — SYS LABEL CORRECT MED

## (undated) DEVICE — DRAIN CHANNEL ROUND 10FR

## (undated) DEVICE — TRAY GENERAL SURGERY

## (undated) DEVICE — DRESSING POST OP MEPILEX AG 4X6  498300

## (undated) DEVICE — EVACUATOR WOUND BULB 100CC

## (undated) DEVICE — DRAPE THREE-QTR REINF 53X77IN

## (undated) DEVICE — TROCAR KII FIOS ZTHREAD 12X100

## (undated) DEVICE — SUT ETHIBOND XTRA 1 OS-6

## (undated) DEVICE — SUTURE SILK 3-0 30 A304H

## (undated) DEVICE — SUT 2-0 VICRYL / SH (J417)

## (undated) DEVICE — SUT VICRYL BR 1 GEN 27 CT-1

## (undated) DEVICE — TUBING MINIBORE EXTENSION

## (undated) DEVICE — SYS SEE SHARP SCOPE ANTIFOG

## (undated) DEVICE — PRESSURIZER HI VAC HIP KIT

## (undated) DEVICE — SUTURE MONOCRYL 4-0 27 SH MCP415H

## (undated) DEVICE — NDL SAFETY 25G X 1.5 ECLIPSE

## (undated) DEVICE — GOWN ECLIPSE REINF LVL4 TWL XL

## (undated) DEVICE — ADHESIVE DERMABOND ADVANCED

## (undated) DEVICE — DRAPE INCISE IOBAN 2 23X17IN

## (undated) DEVICE — SYS CLSR DERMABOND PRINEO 22CM

## (undated) DEVICE — DRAPE IOBAN 23X17 6650EZ

## (undated) DEVICE — DRESSING GRANUFOAM LARGE VAC

## (undated) DEVICE — TIP YANKAUERS BULB NO VENT

## (undated) DEVICE — SYR 10CC LUER LOCK

## (undated) DEVICE — PAD BOVIE ADULT

## (undated) DEVICE — STRAP OR TABLE 5IN X 72IN

## (undated) DEVICE — DRESSING AQUACEL ADH 4X10IN

## (undated) DEVICE — SOL NACL IRR 1000ML BTL

## (undated) DEVICE — DRAPE STERI-DRAPE 1000 17X11IN

## (undated) DEVICE — NDL HYPODERMIC BLUNT 18G 1.5IN

## (undated) DEVICE — GLOVE SURG ULTRA TOUCH 7.5

## (undated) DEVICE — NDL PNEUMOPERITONEUM 150MM

## (undated) DEVICE — BIT DRILL QC 2X100MM SS STRL

## (undated) DEVICE — APPLIER CLIP EPIX UNIV 5X34

## (undated) DEVICE — DRESSING TRANS 4X4 3/4

## (undated) DEVICE — ADHESIVE DERMABOND MINI HV

## (undated) DEVICE — SUT FIBERWIRE #5